# Patient Record
Sex: MALE | Race: WHITE | NOT HISPANIC OR LATINO | Employment: OTHER | URBAN - METROPOLITAN AREA
[De-identification: names, ages, dates, MRNs, and addresses within clinical notes are randomized per-mention and may not be internally consistent; named-entity substitution may affect disease eponyms.]

---

## 2017-01-24 ENCOUNTER — HOSPITAL ENCOUNTER (EMERGENCY)
Facility: HOSPITAL | Age: 52
Discharge: HOME | End: 2017-01-24
Attending: EMERGENCY MEDICINE
Payer: MEDICARE

## 2017-01-24 VITALS
BODY MASS INDEX: 20.89 KG/M2 | SYSTOLIC BLOOD PRESSURE: 96 MMHG | TEMPERATURE: 99 F | DIASTOLIC BLOOD PRESSURE: 65 MMHG | OXYGEN SATURATION: 95 % | WEIGHT: 130 LBS | HEART RATE: 88 BPM | RESPIRATION RATE: 18 BRPM | HEIGHT: 66 IN

## 2017-01-24 DIAGNOSIS — F19.90 IVDU (INTRAVENOUS DRUG USER): ICD-10-CM

## 2017-01-24 DIAGNOSIS — G56.31 RADIAL NERVE PALSY, RIGHT: ICD-10-CM

## 2017-01-24 DIAGNOSIS — M79.601 RIGHT ARM PAIN: Primary | ICD-10-CM

## 2017-01-24 LAB — POCT GLUCOSE: 203 MG/DL (ref 70–110)

## 2017-01-24 PROCEDURE — 29125 APPL SHORT ARM SPLINT STATIC: CPT | Mod: RT

## 2017-01-24 PROCEDURE — 82962 GLUCOSE BLOOD TEST: CPT

## 2017-01-24 PROCEDURE — 99283 EMERGENCY DEPT VISIT LOW MDM: CPT | Mod: 25

## 2017-01-24 NOTE — ED AVS SNAPSHOT
[facility]  [address] [place] [address]               Domenic Mabry   2017  2:30 PM   ED    Description:  Male : 1965   Department:  [facility]           Your Care was Coordinated By:     Provider Role From To    Darrell Pryor MD Attending Provider 17 1438 --      Reason for Visit     Arm Pain           Diagnoses this Visit        Comments    Right arm pain    -  Primary     IVDU (intravenous drug user)         Radial nerve palsy, right           ED Disposition     None           To Do List           Follow-up Information     Schedule an appointment as soon as possible for a visit with Jacob Montanez MD.    Specialty:  Family Medicine    Contact information:    [address]  [place] [address]          Follow up with Sukhdeep Escalera MD.    Specialty:  Neurology    Why:  Neurology    Contact information:    [address]  [address]  [place] [address]  [phone]          Go to Narcotics Anonymous.      [facility]     [facility] Nurse Care [facility] -  Assistance  Registered nurses in the [facility] provide clinical advisement, health education, appointment booking, and other advisory services.  Call for this free service at [phone].             Medications           Message regarding Medications     Verify the changes and/or additions to your medication regime listed below are the same as discussed with your clinician today.  If any of these changes or additions are incorrect, please notify your healthcare provider.        STOP taking these medications     ondansetron (ZOFRAN) 4 MG tablet Take 1 tablet (4 mg total) by mouth every 8 (eight) hours as needed for Nausea.           Verify that the below list of medications is an accurate representation of the medications you are currently taking.  If none reported, the list may be blank. If incorrect, please contact your  "healthcare provider. Carry this list with you in case of emergency.           Current Medications     atorvastatin (LIPITOR) 40 MG tablet Take 40 mg by mouth once daily.    insulin glargine (LANTUS) 100 unit/mL injection Inject 30 Units into the skin every evening.    NOVOLOG 100 unit/mL injection Inject into the skin. Per sliding scale           Clinical Reference Information           Your Vitals Were     BP Pulse Temp Resp Height Weight    96/65 (BP Location: Left arm, Patient Position: Sitting) 88 98.6 °F (37 °C) (Oral) 18 5' 6" (1.676 m) 59 kg (130 lb)    SpO2 BMI             95% 20.98 kg/m2         Allergies as of 1/24/2017     No Known Allergies      Immunizations Administered on Date of Encounter - 1/24/2017     None      ED Micro, Lab, POCT     Start Ordered       Status Ordering Provider    01/24/17 1441 01/24/17 1440  POCT glucose  Once      Acknowledged       ED Imaging Orders     None      Discharge References/Attachments     RADIAL NERVE PALSY (ENGLISH)    HEROIN ABUSE AND ADDICTION, UNDERSTANDING (ENGLISH)      Smoking Cessation     If you would like to quit smoking:   You may be eligible for free services if you are a [place] resident and started smoking cigarettes before September 1, 1988.  Call the Smoking Cessation Trust (SCT) toll free at (115) 870-5860 or (967) 960-0121.   Call 4-800-QUIT-NOW if you do not meet the above criteria.             [facility] complies with applicable Federal civil rights laws and does not discriminate on the basis of race, color, national origin, age, disability, or sex.        Language Assistance Services     ATTENTION: Language assistance services are available, free of charge. Please call [phone].      ATENCIÓN: Si habla español, tiene a olvera disposición servicios gratuitos de asistencia lingüística. Llame al [phone].     CHÚ Ý: N?u b?n nói Ti?ng Vi?t, có các d?ch v? h? tr? ngôn ng? mi?n phí dành cho b?n. G?i s? [phone].   "

## 2017-01-24 NOTE — ED PROVIDER NOTES
"Encounter Date: 1/24/2017    SCRIBE #1 NOTE: I, Edie Marques, am scribing for, and in the presence of,  Darrell Pryor MD. I have scribed the following portions of the note - Other sections scribed: HPI, ROS.       History     Chief Complaint   Patient presents with    Arm Pain     R arm pain "I fell asleep on it."      Review of patient's allergies indicates:  No Known Allergies  HPI Comments: CC: Arm Pain    HPI: 51 year old male, smoker with a PMHx of DM, hepatitis, pancreatitis, and HTN presents to the ED c/o acute, moderate (7/10) right arm pain specifically the forearm and hand. Patient reports going to sleep yesterday, and waking to see his arm had "stopped working". He states the pain is intermittent with a "crazy ache". Patient reports using heroin. Patient otherwise denies elbow pain, shoulder pain and other associated symptoms.        The history is provided by the patient. No  was used.     Past Medical History   Diagnosis Date    Diabetes mellitus     Hepatitis     HTN (hypertension)     Pancreatitis      No past medical history pertinent negatives.  Past Surgical History   Procedure Laterality Date    Shoulder surgery  2013     right shoulder, spur removal.    Cardiac surgery  1999     stent [facility]. ([facility])     Family History   Problem Relation Age of Onset    No Known Problems Mother      Social History   Substance Use Topics    Smoking status: Current Every Day Smoker     Packs/day: 2.00     Types: Cigarettes     Start date: 4/3/1981    Smokeless tobacco: None    Alcohol use No     Review of Systems   Constitutional: Negative for fever.   HENT: Negative for rhinorrhea.    Eyes: Negative for pain.   Respiratory: Negative for cough.    Gastrointestinal: Negative for diarrhea, nausea and vomiting.   Genitourinary: Negative for dysuria.   Musculoskeletal: Negative for neck pain.        (+) Right forearm pain  (+) Right hand pain   Skin: Negative for rash. " "  Neurological: Negative for headaches.       Physical Exam   Initial Vitals   BP Pulse Resp Temp SpO2   01/24/17 1416 01/24/17 1416 01/24/17 1416 01/24/17 1416 01/24/17 1416   96/65 88 18 98.6 °F (37 °C) 95 %     Physical Exam    Nursing note and vitals reviewed.  Constitutional: He appears well-developed and well-nourished.   HENT:   Head: Normocephalic and atraumatic.   Eyes: Conjunctivae are normal. No scleral icterus.   Neck: Normal range of motion. Neck supple.   Pulmonary/Chest: No stridor.   Musculoskeletal: Normal range of motion. He exhibits no edema.        Right hand: He exhibits normal capillary refill. Decreased sensation is present in the radial distribution. Jason Test: There is rapid refill.   Neurological: He is alert and oriented to person, place, and time. He has normal strength.   Skin: Skin is warm and dry. No rash noted.   Psychiatric: He has a normal mood and affect. Thought content normal.         ED Course   Procedures  Labs Reviewed   POCT GLUCOSE - Abnormal; Notable for the following:        Result Value    POCT Glucose 203 (*)     All other components within normal limits           Medical Decision Making:   History:   Old Medical Records: I decided to obtain old medical records.  Old Records Summarized: other records.  Clinical Tests:   Lab Tests: Ordered and Reviewed  Radiological Study: Reviewed    Additional MDM:   Comments: 51 year old male who presents to the ED c/o decreased sensation to his right hand s/p falling "asleep on it" last night.  Exam finding consistent with radial nerve palsy.  He has been placed in a velcro wrist splint.  Recommend supportive care as well as close follow up with both his pcp as well as neurology asap.  Return instructions have been discussed prior to discharge.    .          Scribe Attestation:   Scribe #1: I performed the above scribed service and the documentation accurately describes the services I performed. I attest to the accuracy of the " note.    Attending Attestation:           Physician Attestation for Scribe:  Physician Attestation Statement for Scribe #1: I, Darrell Pryor MD, reviewed documentation, as scribed by Edie Marques in my presence, and it is both accurate and complete.                 ED Course     Clinical Impression:   The primary encounter diagnosis was Right arm pain. Diagnoses of IVDU (intravenous drug user) and Radial nerve palsy, right were also pertinent to this visit.    Disposition:   Disposition: Discharged       Darrell Pryor MD  02/22/17 2546

## 2017-01-26 ENCOUNTER — OFFICE VISIT (OUTPATIENT)
Dept: FAMILY MEDICINE | Facility: CLINIC | Age: 52
End: 2017-01-26
Payer: MEDICARE

## 2017-01-26 VITALS
HEIGHT: 68 IN | DIASTOLIC BLOOD PRESSURE: 76 MMHG | BODY MASS INDEX: 19.25 KG/M2 | HEART RATE: 101 BPM | SYSTOLIC BLOOD PRESSURE: 110 MMHG | TEMPERATURE: 98 F | OXYGEN SATURATION: 99 % | WEIGHT: 127 LBS

## 2017-01-26 DIAGNOSIS — M79.601 UPPER EXTREMITY PAIN, ANTERIOR, RIGHT: Primary | ICD-10-CM

## 2017-01-26 PROCEDURE — 99213 OFFICE O/P EST LOW 20 MIN: CPT | Mod: PBBFAC,PN | Performed by: FAMILY MEDICINE

## 2017-01-26 PROCEDURE — 99999 PR PBB SHADOW E&M-EST. PATIENT-LVL III: CPT | Mod: PBBFAC,,, | Performed by: FAMILY MEDICINE

## 2017-01-26 PROCEDURE — 99214 OFFICE O/P EST MOD 30 MIN: CPT | Mod: S$PBB,,, | Performed by: FAMILY MEDICINE

## 2017-01-26 RX ORDER — GABAPENTIN 300 MG/1
CAPSULE ORAL
Status: ON HOLD | COMMUNITY
Start: 2017-01-20 | End: 2017-11-25

## 2017-01-26 RX ORDER — NABUMETONE 750 MG/1
750 TABLET, FILM COATED ORAL 2 TIMES DAILY
Qty: 60 TABLET | Refills: 0 | Status: ON HOLD | OUTPATIENT
Start: 2017-01-26 | End: 2017-11-25 | Stop reason: HOSPADM

## 2017-01-26 RX ORDER — INSULIN GLARGINE 100 [IU]/ML
INJECTION, SOLUTION SUBCUTANEOUS
Status: ON HOLD | COMMUNITY
Start: 2017-01-03 | End: 2017-11-25

## 2017-01-26 RX ORDER — INSULIN LISPRO 100 [IU]/ML
INJECTION, SOLUTION INTRAVENOUS; SUBCUTANEOUS
Status: ON HOLD | COMMUNITY
Start: 2017-01-03 | End: 2017-11-25 | Stop reason: HOSPADM

## 2017-01-26 NOTE — MR AVS SNAPSHOT
[facility]  [address]  [place] [address]  Phone: [phone]                  Domenic Mabry   2017 1:00 PM   Office Visit    Description:  Male : 1965   Provider:  Stefan Hung MD   Department:  [facility]           Reason for Visit     Arm Pain     Hand Pain           Diagnoses this Visit        Comments    Upper extremity pain, anterior, right    -  Primary            To Do List           Future Appointments        Provider Department Dept Phone    2017 10:20 AM Sukhdeep Escalera MD [facility] [phone]      Goals (5 Years of Data)     None      Follow-Up and Disposition     Return if symptoms worsen or fail to improve.      [facility]     [facility] Care [facility] -  Assistance  Registered nurses in the [facility] provide clinical advisement, health education, appointment booking, and other advisory services.  Call for this free service at [phone].             Medications           Message regarding Medications     Verify the changes and/or additions to your medication regime listed below are the same as discussed with your clinician today.  If any of these changes or additions are incorrect, please notify your healthcare provider.             Verify that the below list of medications is an accurate representation of the medications you are currently taking.  If none reported, the list may be blank. If incorrect, please contact your healthcare provider. Carry this list with you in case of emergency.           Current Medications     gabapentin (NEURONTIN) 300 MG capsule     HUMALOG 100 unit/mL injection     LANTUS 100 unit/mL injection     NOVOLOG 100 unit/mL injection Inject into the skin. Per sliding scale    atorvastatin (LIPITOR) 40 MG tablet Take 40 mg by mouth once daily.           Clinical Reference Information           Vital Signs - Last Recorded  Most recent update: 2017  1:14 PM  "by Marietta De La Vega MA    BP Pulse Temp Ht Wt SpO2    110/76 (BP Location: Right arm, Patient Position: Sitting, BP Method: Manual) 101 98.1 °F (36.7 °C) (Oral) 5' 8" (1.727 m) 57.6 kg (127 lb) 99%    BMI                19.31 kg/m2          Blood Pressure          Most Recent Value    BP  110/76      Allergies as of 1/26/2017     No Known Allergies      Immunizations Administered on Date of Encounter - 1/26/2017     None      Orders Placed During Today's Visit     Future Labs/Procedures Expected by Expires    US Upper Extremity Veins Right  1/26/2017 1/26/2018      Instructions    1.  Patient to keep appointment with neurology and for US of right arm       Smoking Cessation     If you would like to quit smoking:   You may be eligible for free services if you are a [place] resident and started smoking cigarettes before September 1, 1988.  Call the Smoking Cessation Trust (SCT) toll free at ([phone] 744-4940 or (595) [phone].   Call 800-QUIT-NOW if you do not meet the above criteria.            "

## 2017-01-26 NOTE — PROGRESS NOTES
Routine Office Visit    Patient Name: Domenic Mabry    : 1965  MRN: [id number]    Subjective:  Domenic is a 51 y.o. male who presents today for:    1. Right arm pain   Patient states that he woke up 2-3 days ago with severe right arm pain and weakness in that arm.  He was seen in the ED and told it was likely nerve injury from compression and would resolve within a few days.  He states that the movement has improved but that he has a sharp pain in the lower arm just above the wrist.  There has been no other trauma.  He is an IV drug user and states that it has been a few days since he last used.  He is also type 1 diabetic.  He states that he has been taking Ibuprofen for pain, but that it is not helping.  Warm water is helping for short periods.  There has been no swelling or redness.  He denies it being cold to touch.       Past Medical History  Past Medical History   Diagnosis Date    Diabetes mellitus     Hepatitis     HTN (hypertension)     Pancreatitis        Past Surgical History  Past Surgical History   Procedure Laterality Date    Shoulder surgery       right shoulder, spur removal.    Cardiac surgery       stent placed. ([facility])       Family History  Family History   Problem Relation Age of Onset    No Known Problems Mother        Social History  Social History     Social History    Marital status: Single     Spouse name: N/A    Number of children: N/A    Years of education: N/A     Occupational History    Not on file.     Social History Main Topics    Smoking status: Current Every Day Smoker     Packs/day: 2.00     Types: Cigarettes     Start date: 4/3/1981    Smokeless tobacco: Not on file    Alcohol use No    Drug use: Yes     Special: IV      Comment: quit x2 months ago    Sexual activity: Yes     Partners: Female     Other Topics Concern    Not on file     Social History Narrative       Current Medications  Current Outpatient Prescriptions on File Prior to  "Visit   Medication Sig Dispense Refill    NOVOLOG 100 unit/mL injection Inject into the skin. Per sliding scale      atorvastatin (LIPITOR) 40 MG tablet Take 40 mg by mouth once daily.       No current facility-administered medications on file prior to visit.        Allergies   Review of patient's allergies indicates:  No Known Allergies    Review of Systems (Pertinent positives)  Review of Systems   Constitutional: Negative.    HENT: Negative.    Eyes: Negative.    Respiratory: Negative.    Cardiovascular: Negative.    Musculoskeletal: Positive for myalgias.   Skin: Negative.    Neurological: Positive for focal weakness.         Visit Vitals    /76 (BP Location: Right arm, Patient Position: Sitting, BP Method: Manual)    Pulse 101    Temp 98.1 °F (36.7 °C) (Oral)    Ht 5' 8" (1.727 m)    Wt 57.6 kg (127 lb)    SpO2 99%    BMI 19.31 kg/m2       GENERAL APPEARANCE: in no apparent distress and well developed and well nourished  HEENT: PERRL, EOMI, Sclera clear, anicteric, Oropharynx clear, no lesions, Neck supple with midline trachea  NECK: normal, supple, no adenopathy, thyroid normal in size  RESPIRATORY: appears well, vitals normal, no respiratory distress, acyanotic, normal RR, chest clear, no wheezing, crepitations, rhonchi, normal symmetric air entry  HEART: regular rate and rhythm, S1, S2 normal, no murmur, click, rub or gallop.    ABDOMEN: abdomen is soft without tenderness, no masses, no hernias, no organomegaly, no rebound, no guarding. Suprapubic tenderness absent. No CVA tenderness.  SKIN: no rashes, no wounds, no other lesions  Extremities:  Elevated arm without assistance and all fingers moved; no swelling, redness, or pallor.  Pain on palpation of posterior forearm  PSYCH: Alert, oriented x 3, thought content appropriate, speech normal, pleasant and cooperative, good eye contact, well groomed    Assessment/Plan:  Domenic Mabry is a 51 y.o. male who presents today for :    Domenic was " seen today for arm pain and hand pain.    Diagnoses and all orders for this visit:    Upper extremity pain, anterior, right  -     US Upper Extremity Veins Right; Future  -     nabumetone (RELAFEN) 750 MG tablet; Take 1 tablet (750 mg total) by mouth 2 (two) times daily.    1.  Will get US to look for blood clot, but not highly suspicious  2.  No narcotics as patient continues to use IV narcotics  3.  No evidence of arterial thrombosis  4.  He is to call with any concern  5.  He was told not to mix Relafen with ibuprofen      Stefan Hung MD

## 2017-01-30 ENCOUNTER — TELEPHONE (OUTPATIENT)
Dept: FAMILY MEDICINE | Facility: CLINIC | Age: 52
End: 2017-01-30

## 2017-01-30 NOTE — TELEPHONE ENCOUNTER
Patient states medication (Relafen) is not covered by his insurance.. Pt would like another RX to be called in that his insurance will cover

## 2017-01-30 NOTE — TELEPHONE ENCOUNTER
----- Message from Priti Jaquez sent at 1/30/2017  1:16 PM CST -----  Contact: pt came in office visit  Pt came in and said his meds are not covered with his insurance can you give the pt a call to discuss the issue

## 2017-02-02 ENCOUNTER — HOSPITAL ENCOUNTER (OUTPATIENT)
Dept: RADIOLOGY | Facility: HOSPITAL | Age: 52
Discharge: HOME | End: 2017-02-02
Attending: FAMILY MEDICINE
Payer: MEDICARE

## 2017-02-02 DIAGNOSIS — M79.601 UPPER EXTREMITY PAIN, ANTERIOR, RIGHT: ICD-10-CM

## 2017-02-02 PROCEDURE — 93971 EXTREMITY STUDY: CPT | Mod: 26,,, | Performed by: RADIOLOGY

## 2017-02-02 PROCEDURE — 93971 EXTREMITY STUDY: CPT | Mod: TC

## 2017-02-07 ENCOUNTER — TELEPHONE (OUTPATIENT)
Dept: FAMILY MEDICINE | Facility: CLINIC | Age: 52
End: 2017-02-07

## 2017-02-07 NOTE — TELEPHONE ENCOUNTER
----- Message from Stefan Hung MD sent at 2/7/2017  3:09 PM CST -----  Please let patient know that he has a superficial blood clot in his right arm that is likely from introducing a needle/IV into the vein.  There is no need for medication to treat this as they resolve on their own.  He should apply warm compresses to the arm and if pain worsens or persists should go to the ED    Thanks,  Dr. Hung

## 2017-02-27 ENCOUNTER — TELEPHONE (OUTPATIENT)
Dept: NEUROLOGY | Facility: CLINIC | Age: 52
End: 2017-02-27

## 2017-10-06 ENCOUNTER — HOSPITAL ENCOUNTER (EMERGENCY)
Facility: HOSPITAL | Age: 52
Discharge: HOME | End: 2017-10-06
Attending: EMERGENCY MEDICINE
Payer: MEDICARE

## 2017-10-06 VITALS
SYSTOLIC BLOOD PRESSURE: 152 MMHG | OXYGEN SATURATION: 99 % | RESPIRATION RATE: 16 BRPM | TEMPERATURE: 99 F | WEIGHT: 135 LBS | BODY MASS INDEX: 20.46 KG/M2 | HEIGHT: 68 IN | DIASTOLIC BLOOD PRESSURE: 92 MMHG | HEART RATE: 105 BPM

## 2017-10-06 DIAGNOSIS — S29.019A THORACIC MYOFASCIAL STRAIN, INITIAL ENCOUNTER: Primary | ICD-10-CM

## 2017-10-06 DIAGNOSIS — S39.012A LUMBAR STRAIN, INITIAL ENCOUNTER: ICD-10-CM

## 2017-10-06 LAB — POCT GLUCOSE: 117 MG/DL (ref 70–110)

## 2017-10-06 PROCEDURE — 63600175 PHARM REV CODE 636 W HCPCS: Performed by: EMERGENCY MEDICINE

## 2017-10-06 PROCEDURE — 82962 GLUCOSE BLOOD TEST: CPT

## 2017-10-06 PROCEDURE — 25000003 PHARM REV CODE 250: Performed by: EMERGENCY MEDICINE

## 2017-10-06 PROCEDURE — 96372 THER/PROPH/DIAG INJ SC/IM: CPT

## 2017-10-06 PROCEDURE — 99283 EMERGENCY DEPT VISIT LOW MDM: CPT | Mod: 25

## 2017-10-06 RX ORDER — HYDROMORPHONE HYDROCHLORIDE 2 MG/ML
1 INJECTION, SOLUTION INTRAMUSCULAR; INTRAVENOUS; SUBCUTANEOUS
Status: COMPLETED | OUTPATIENT
Start: 2017-10-06 | End: 2017-10-06

## 2017-10-06 RX ORDER — HYDROCODONE BITARTRATE AND ACETAMINOPHEN 5; 325 MG/1; MG/1
1 TABLET ORAL EVERY 4 HOURS PRN
Qty: 15 TABLET | Refills: 0 | Status: SHIPPED | OUTPATIENT
Start: 2017-10-06 | End: 2017-10-16

## 2017-10-06 RX ORDER — ONDANSETRON 8 MG/1
8 TABLET, ORALLY DISINTEGRATING ORAL
Status: COMPLETED | OUTPATIENT
Start: 2017-10-06 | End: 2017-10-06

## 2017-10-06 RX ORDER — METHOCARBAMOL 500 MG/1
1000 TABLET, FILM COATED ORAL 3 TIMES DAILY
Qty: 30 TABLET | Refills: 0 | Status: SHIPPED | OUTPATIENT
Start: 2017-10-06 | End: 2017-10-11

## 2017-10-06 RX ADMIN — HYDROMORPHONE HYDROCHLORIDE 1 MG: 2 INJECTION, SOLUTION INTRAMUSCULAR; INTRAVENOUS; SUBCUTANEOUS at 09:10

## 2017-10-06 RX ADMIN — ONDANSETRON 8 MG: 8 TABLET, ORALLY DISINTEGRATING ORAL at 09:10

## 2017-10-07 NOTE — DISCHARGE INSTRUCTIONS
Please use ice for 24 hours, then you may use heat.  Rest.  Medicines as above.  Please follow-up with your primary care doctor next week.  Return immediately if you get worse or if new problems develop.  Rest.

## 2017-10-07 NOTE — ED TRIAGE NOTES
Pt reports picking up something and then neck and middle part of back began to hurt.  Pt c/o not being able to turn neck.  Pt reports pain as 7.

## 2017-10-07 NOTE — ED PROVIDER NOTES
Encounter Date: 10/6/2017       History     Chief Complaint   Patient presents with    Neck Pain     Pt states he picked something up today and he felt his neck snapped. C/o of middle back pain radiating to neck.     HPI   This 52-year-old male presents to the emergency room complaining of sharp pain in his back generally as well as the right lateral neck.  The patient lifted up a flowerpot and felt a sudden twinge in his back.  It is a sharp burning pain.  It is moderately severe.  It is constant.  It is aggravated by movement.  It is very poorly localized.  There is no history of trauma.  The patient has no bowel or bladder symptoms.  There is a little bit of pain that radiates to his right proximal lateral thigh.  The patient denies other injuries or problems.  He did not fall.  Review of patient's allergies indicates:  No Known Allergies  Past Medical History:   Diagnosis Date    Diabetes mellitus     Hepatitis     HTN (hypertension)     Pancreatitis      Past Surgical History:   Procedure Laterality Date    [facility]    SHOULDER SURGERY  2013    right shoulder, spur removal.     Family History   Problem Relation Age of Onset    No Known Problems Mother      Social History   Substance Use Topics    Smoking status: Current Every Day Smoker     Packs/day: 2.00     Types: Cigarettes     Start date: 4/3/1981    Smokeless tobacco: Never Used    Alcohol use No     Review of Systems  The patient was questioned specifically with regard to the following.  General: Fever, chills, sweats. Neuro: Headache. Eyes: eye problems. ENT: Ear pain, sore throat. Cardiovascular: Chest pain. Respiratory: Cough, shortness of breath. Gastrointestinal: Abdominal pain, vomiting, diarrhea. Genitourinary: Painful urination.  Musculoskeletal: Arm and leg problems. Skin: Rash.  The review of systems was negative except for the following: Back pain, neck pain  Physical Exam     Initial  Vitals [10/06/17 2111]   BP Pulse Resp Temp SpO2   (!) 152/92 105 16 98.8 °F (37.1 °C) 99 %      MAP       112         Physical Exam  The patient was examined specifically for the following:   General:No significant distress, Good color, Warm and dry. Head and neck:Scalp atraumatic, Neck supple. Neurological:Appropriate conversation, Gross motor deficits. Eyes:Conjugate gaze, Clear corneas. ENT: No epistaxis. Cardiac: Regular rate and rhythm, Grossly normal heart tones. Pulmonary: Wheezing, Rales. Gastrointestinal: Abdominal tenderness, Abdominal distention. Musculoskeletal: Extremity deformity, Apparent pain with range of motion of the joints. Skin: Rash.   The findings on examination were normal except for the following: Vital signs are essentially stable.  The patient's lungs are clear and free of wheezing rales of the rhonchi.  Heart tones are normal.  The patient's regular rate and rhythm.  The abdomen is soft.  The patient is mildly diffuse thoracolumbar tenderness and tenderness of the right posterior lateral neck.  There is no midline cervical tenderness.  There is minimal midline spinal tenderness.  ED Course   Procedures  Labs Reviewed   POCT GLUCOSE - Abnormal; Notable for the following:        Result Value    POCT Glucose 117 (*)     All other components within normal limits         Medical decision making: Given the above I doubt fracture.  There is no history of trauma.  There are no signs of cauda equina.  My plan will be to discharge this patient outpatient evaluation and treatment.  I will treat him for pain.  I will have him follow-up with primary care.  I doubt significant disc injuries.  I will treat patient symptomatically and hope for improvement.  There are no significant neurologic findings.                         ED Course      Clinical Impression:   The primary encounter diagnosis was Thoracic myofascial strain, initial encounter. A diagnosis of Lumbar strain, initial encounter was also  pertinent to this visit.                           Cullen Beauchamp MD  10/07/17 0887

## 2017-10-15 ENCOUNTER — HOSPITAL ENCOUNTER (EMERGENCY)
Facility: HOSPITAL | Age: 52
Discharge: HOME | End: 2017-10-15
Attending: EMERGENCY MEDICINE
Payer: MEDICARE

## 2017-10-15 VITALS
SYSTOLIC BLOOD PRESSURE: 107 MMHG | OXYGEN SATURATION: 99 % | HEART RATE: 79 BPM | DIASTOLIC BLOOD PRESSURE: 74 MMHG | BODY MASS INDEX: 20.46 KG/M2 | TEMPERATURE: 98 F | WEIGHT: 135 LBS | RESPIRATION RATE: 18 BRPM | HEIGHT: 68 IN

## 2017-10-15 DIAGNOSIS — Z86.39 HISTORY OF DIABETES MELLITUS: ICD-10-CM

## 2017-10-15 DIAGNOSIS — M54.9 BACK PAIN, UNSPECIFIED BACK LOCATION, UNSPECIFIED BACK PAIN LATERALITY, UNSPECIFIED CHRONICITY: Primary | ICD-10-CM

## 2017-10-15 DIAGNOSIS — Z87.39 HISTORY OF BACK PAIN: ICD-10-CM

## 2017-10-15 LAB — POCT GLUCOSE: 136 MG/DL (ref 70–110)

## 2017-10-15 PROCEDURE — 96372 THER/PROPH/DIAG INJ SC/IM: CPT

## 2017-10-15 PROCEDURE — 63600175 PHARM REV CODE 636 W HCPCS: Performed by: PHYSICIAN ASSISTANT

## 2017-10-15 PROCEDURE — 99284 EMERGENCY DEPT VISIT MOD MDM: CPT | Mod: 25

## 2017-10-15 PROCEDURE — 25000003 PHARM REV CODE 250: Performed by: PHYSICIAN ASSISTANT

## 2017-10-15 PROCEDURE — 82962 GLUCOSE BLOOD TEST: CPT

## 2017-10-15 RX ORDER — KETOROLAC TROMETHAMINE 30 MG/ML
30 INJECTION, SOLUTION INTRAMUSCULAR; INTRAVENOUS
Status: COMPLETED | OUTPATIENT
Start: 2017-10-15 | End: 2017-10-15

## 2017-10-15 RX ORDER — MELOXICAM 7.5 MG/1
7.5 TABLET ORAL DAILY
Qty: 12 TABLET | Refills: 0 | Status: SHIPPED | OUTPATIENT
Start: 2017-10-15 | End: 2017-11-04

## 2017-10-15 RX ORDER — DIAZEPAM 5 MG/1
5 TABLET ORAL EVERY 8 HOURS PRN
Qty: 8 TABLET | Refills: 0 | Status: ON HOLD | OUTPATIENT
Start: 2017-10-15 | End: 2017-11-25 | Stop reason: HOSPADM

## 2017-10-15 RX ORDER — DIAZEPAM 5 MG/1
5 TABLET ORAL
Status: COMPLETED | OUTPATIENT
Start: 2017-10-15 | End: 2017-10-15

## 2017-10-15 RX ADMIN — KETOROLAC TROMETHAMINE 30 MG: 30 INJECTION, SOLUTION INTRAMUSCULAR; INTRAVENOUS at 04:10

## 2017-10-15 RX ADMIN — DIAZEPAM 5 MG: 5 TABLET ORAL at 04:10

## 2017-10-15 NOTE — ED TRIAGE NOTES
Visited  ED 10/6/17 for back pain. Here today with c/o shooting pain in back,  Throbbing constant HA .

## 2017-10-15 NOTE — ED PROVIDER NOTES
"Encounter Date: 10/15/2017    SCRIBE #1 NOTE: I, Pieter Carranza, am scribing for, and in the presence of,  Pieter Rowley PA-C. I have scribed the following portions of the note - Other sections scribed: HPI and ROS.       History     Chief Complaint   Patient presents with    Back Pain     "I threw my back out approx 10 days ago."  Now pains is "back with a vengence".  All over pain, starts in lower back shooting up spine.     CC: Back Pain    HPI: This 52 y.o. Male with diabetes mellitus, hepatitis, HTN and pancreatitis presents to the ED c/o worsening back pain that shoots up to his neck which began x3 weeks ago. He is also c/o shoulder pain which began x1 weeks ago. His pain occurred as the patient was moving stuff around the house and cutting grass. The patient came to the ED on 10/6/17 for the same pain but this time he has c/o shoulder blade pain. He complies with hydrocodone and methocarbamol which has given at discharge on 10/6/17, which helped with relief. He complies with gabapentin for pain which makes his mouth dry. He admits to taking ibuprofen today when he woke up with slight relief. The patient reports his pain hurts the worse in between his shoulder blades. He states that he has had chronic back pain for years but it has never been this bad. The patient is a smoker. He denies chest pain, abdominal pain, fever, chills, rhinorrhea, diarrhea, nausea or emesis. No other symptoms or alleviating factors present.      The history is provided by the patient. No  was used.     Review of patient's allergies indicates:  No Known Allergies  Past Medical History:   Diagnosis Date    Diabetes mellitus     Hepatitis     HTN (hypertension)     Pancreatitis      Past Surgical History:   Procedure Laterality Date    CARDIAC SURGERY  1999    stent placed. ([facility])    SHOULDER SURGERY  2013    right shoulder, spur removal.     Family History   Problem Relation Age of Onset    No " Known Problems Mother      Social History   Substance Use Topics    Smoking status: Current Every Day Smoker     Packs/day: 2.00     Types: Cigarettes     Start date: 4/3/1981    Smokeless tobacco: Never Used    Alcohol use No     Review of Systems   Constitutional: Negative for chills and fever.   HENT: Negative for ear pain and rhinorrhea.    Eyes: Negative for redness.   Respiratory: Negative for cough and shortness of breath.    Cardiovascular: Negative for chest pain and leg swelling.   Gastrointestinal: Negative for abdominal pain, diarrhea, nausea and vomiting.   Genitourinary: Negative for dysuria and hematuria.   Musculoskeletal: Positive for back pain (lower), myalgias (shoulder pain) and neck pain.   Skin: Negative for rash.   Neurological: Negative for headaches.       Physical Exam     Initial Vitals [10/15/17 1505]   BP Pulse Resp Temp SpO2   126/68 97 18 98.5 °F (36.9 °C) 97 %      MAP       87.33         Physical Exam    Nursing note and vitals reviewed.  Constitutional: He appears well-developed and well-nourished. He is not diaphoretic. No distress.   HENT:   Head: Normocephalic and atraumatic.   Nose: Nose normal.   Eyes: Conjunctivae and EOM are normal. Right eye exhibits no discharge. Left eye exhibits no discharge.   Neck: Normal range of motion. No tracheal deviation present. No JVD present.   Cardiovascular: Normal rate, regular rhythm and normal heart sounds. Exam reveals no friction rub.    No murmur heard.  Pulmonary/Chest: Breath sounds normal. No stridor. No respiratory distress. He has no wheezes. He has no rhonchi. He has no rales. He exhibits no tenderness.   Abdominal: Soft. He exhibits no distension. There is no tenderness. There is no rigidity, no rebound, no guarding, no CVA tenderness, no tenderness at McBurney's point and negative Burger's sign.   Musculoskeletal: Normal range of motion.   Diffuse reproducible tenderness of the bilateral trapezius, thoracic, and lumbar  musculature without midline tenderness to the spine or bony deformities to the shoulder or hip.  Fully ranges cervical spine and shoulders without pain.  Ambulating without limp or pain.   Neurological: He is alert and oriented to person, place, and time. He displays no seizure activity. Coordination and gait normal. GCS eye subscore is 4. GCS verbal subscore is 5. GCS motor subscore is 6.   Skin: Skin is warm and dry. No rash and no abscess noted. No erythema. No pallor.         ED Course   Procedures  Labs Reviewed   POCT GLUCOSE - Abnormal; Notable for the following:        Result Value    POCT Glucose 136 (*)     All other components within normal limits             Medical Decision Making:   History:   Old Medical Records: I decided to obtain old medical records.      This is an emergent evaluation of a 52 y.o. male with a PMHx of chronic low back pain, DM, and IVDU presenting to the ED for back pain. Denies traumatic injury, personal Hx of cancer, chronic steroid use, current use of IV drug use, fever, urinary retention/loss of bowel bladder control, urinary symptoms, SOB, and chest pain. Vitals WNL, afebrile. Patient is non-toxic appearing and in no acute distress. Ambulatory. No neurological deficits.    Presentation most consistent with muscle strain. No Hx of trauma to suggest acute vertebral fracture/dislocation, or warrant emergent imaging at this time. I have also considered but doubt cauda equina, AAA rupture, ureteral stone, PE, and PTX. I have a low clinical suspicion for infectious etiology, including for epidural abscess, vertebral osteomyelitis, and pyelonephritis. No HZV. I've considered but have a lower suspicion for neoplastic etiology requiring emergent imaging at this time.     Acute pain controlled in ED. Discharged home with supportive care. Needs orthopedic evaluation and may need evaluation by pain management. An educational resource on back pain and back care tips is issued to the  patient.     I discussed with the patient the diagnosis, treatment plan, indications for return to the emergency department, and for expected follow-up. The patient verbalized an understanding. The patient is asked if there are any questions or concerns. We discuss the case, until all issues are addressed to the patients satisfaction. Patient understands and is agreeable to the plan.     I discussed this patient with Dr. Beauchamp who is in agreement with my assessment and plan           Scribe Attestation:   Scribe #1: I performed the above scribed service and the documentation accurately describes the services I performed. I attest to the accuracy of the note.    Attending Attestation:     Physician Attestation Statement for NP/PA:   I discussed this assessment and plan of this patient with the NP/PA, but I did not personally examine the patient. The face to face encounter was performed by the NP/PA.        Physician Attestation for Scribe:  Physician Attestation Statement for Scribe #1: I, Pieter Rowley PA-C, reviewed documentation, as scribed by Pieter Carranza in my presence, and it is both accurate and complete.                 ED Course      Clinical Impression:   The primary encounter diagnosis was Back pain, unspecified back location, unspecified back pain laterality, unspecified chronicity. Diagnoses of History of back pain and History of diabetes mellitus were also pertinent to this visit.    Disposition:   Disposition: Discharged  Condition: Stable                        Pieter Rowley PA-C  10/15/17 7064       Cullen Beauchamp MD  10/17/17 7912

## 2017-11-04 ENCOUNTER — HOSPITAL ENCOUNTER (EMERGENCY)
Facility: HOSPITAL | Age: 52
Discharge: HOME | End: 2017-11-04
Attending: EMERGENCY MEDICINE
Payer: MEDICARE

## 2017-11-04 VITALS
HEIGHT: 68 IN | WEIGHT: 135 LBS | SYSTOLIC BLOOD PRESSURE: 106 MMHG | HEART RATE: 77 BPM | OXYGEN SATURATION: 99 % | TEMPERATURE: 98 F | DIASTOLIC BLOOD PRESSURE: 59 MMHG | BODY MASS INDEX: 20.46 KG/M2 | RESPIRATION RATE: 18 BRPM

## 2017-11-04 DIAGNOSIS — G89.29 CHRONIC BILATERAL LOW BACK PAIN WITHOUT SCIATICA: Primary | ICD-10-CM

## 2017-11-04 DIAGNOSIS — M54.2 CHRONIC NECK PAIN: ICD-10-CM

## 2017-11-04 DIAGNOSIS — M54.50 CHRONIC BILATERAL LOW BACK PAIN WITHOUT SCIATICA: Primary | ICD-10-CM

## 2017-11-04 DIAGNOSIS — G89.29 CHRONIC NECK PAIN: ICD-10-CM

## 2017-11-04 LAB
ALBUMIN SERPL BCP-MCNC: 3.6 G/DL
ALP SERPL-CCNC: 88 U/L
ALT SERPL W/O P-5'-P-CCNC: 15 U/L
AMPHET+METHAMPHET UR QL: NEGATIVE
ANION GAP SERPL CALC-SCNC: 10 MMOL/L
AST SERPL-CCNC: 19 U/L
BARBITURATES UR QL SCN>200 NG/ML: NEGATIVE
BASOPHILS # BLD AUTO: 0 K/UL
BASOPHILS NFR BLD: 0 %
BENZODIAZ UR QL SCN>200 NG/ML: NORMAL
BILIRUB SERPL-MCNC: 0.5 MG/DL
BILIRUB UR QL STRIP: NEGATIVE
BNP SERPL-MCNC: 14 PG/ML
BUN SERPL-MCNC: 14 MG/DL
BZE UR QL SCN: NORMAL
CALCIUM SERPL-MCNC: 9.6 MG/DL
CANNABINOIDS UR QL SCN: NEGATIVE
CHLORIDE SERPL-SCNC: 100 MMOL/L
CLARITY UR: CLEAR
CO2 SERPL-SCNC: 27 MMOL/L
COLOR UR: NORMAL
CREAT SERPL-MCNC: 0.9 MG/DL
CREAT UR-MCNC: 39.3 MG/DL
CRP SERPL-MCNC: 9.2 MG/L
D DIMER PPP IA.FEU-MCNC: 0.26 MG/L FEU
DIFFERENTIAL METHOD: ABNORMAL
EOSINOPHIL # BLD AUTO: 0 K/UL
EOSINOPHIL NFR BLD: 0.2 %
ERYTHROCYTE [DISTWIDTH] IN BLOOD BY AUTOMATED COUNT: 14.5 %
EST. GFR  (AFRICAN AMERICAN): >60 ML/MIN/1.73 M^2
EST. GFR  (NON AFRICAN AMERICAN): >60 ML/MIN/1.73 M^2
ETHANOL SERPL-MCNC: <10 MG/DL
GLUCOSE SERPL-MCNC: 153 MG/DL
GLUCOSE UR QL STRIP: NEGATIVE
HCT VFR BLD AUTO: 38.4 %
HGB BLD-MCNC: 12.8 G/DL
HGB UR QL STRIP: NEGATIVE
KETONES UR QL STRIP: NEGATIVE
LEUKOCYTE ESTERASE UR QL STRIP: NEGATIVE
LIPASE SERPL-CCNC: 26 U/L
LYMPHOCYTES # BLD AUTO: 2.1 K/UL
LYMPHOCYTES NFR BLD: 50.1 %
MCH RBC QN AUTO: 26.8 PG
MCHC RBC AUTO-ENTMCNC: 33.3 G/DL
MCV RBC AUTO: 80 FL
METHADONE UR QL SCN>300 NG/ML: NEGATIVE
MONOCYTES # BLD AUTO: 0.5 K/UL
MONOCYTES NFR BLD: 10.9 %
NEUTROPHILS # BLD AUTO: 1.6 K/UL
NEUTROPHILS NFR BLD: 38.8 %
NITRITE UR QL STRIP: NEGATIVE
OPIATES UR QL SCN: NORMAL
PCP UR QL SCN>25 NG/ML: NEGATIVE
PH UR STRIP: 5 [PH] (ref 5–8)
PLATELET # BLD AUTO: 202 K/UL
PMV BLD AUTO: 9.4 FL
POCT GLUCOSE: 50 MG/DL (ref 70–110)
POTASSIUM SERPL-SCNC: 4.5 MMOL/L
PROT SERPL-MCNC: 8.1 G/DL
PROT UR QL STRIP: NEGATIVE
RBC # BLD AUTO: 4.78 M/UL
SODIUM SERPL-SCNC: 137 MMOL/L
SP GR UR STRIP: 1.01 (ref 1–1.03)
TOXICOLOGY INFORMATION: NORMAL
TROPONIN I SERPL DL<=0.01 NG/ML-MCNC: 0.01 NG/ML
URN SPEC COLLECT METH UR: NORMAL
UROBILINOGEN UR STRIP-ACNC: NEGATIVE EU/DL
WBC # BLD AUTO: 4.13 K/UL

## 2017-11-04 PROCEDURE — 80320 DRUG SCREEN QUANTALCOHOLS: CPT

## 2017-11-04 PROCEDURE — 83880 ASSAY OF NATRIURETIC PEPTIDE: CPT

## 2017-11-04 PROCEDURE — 84484 ASSAY OF TROPONIN QUANT: CPT

## 2017-11-04 PROCEDURE — 63600175 PHARM REV CODE 636 W HCPCS: Performed by: EMERGENCY MEDICINE

## 2017-11-04 PROCEDURE — 85025 COMPLETE CBC W/AUTO DIFF WBC: CPT

## 2017-11-04 PROCEDURE — 86140 C-REACTIVE PROTEIN: CPT

## 2017-11-04 PROCEDURE — 80307 DRUG TEST PRSMV CHEM ANLYZR: CPT

## 2017-11-04 PROCEDURE — 99284 EMERGENCY DEPT VISIT MOD MDM: CPT | Mod: 25

## 2017-11-04 PROCEDURE — 96372 THER/PROPH/DIAG INJ SC/IM: CPT

## 2017-11-04 PROCEDURE — 85379 FIBRIN DEGRADATION QUANT: CPT

## 2017-11-04 PROCEDURE — 80053 COMPREHEN METABOLIC PANEL: CPT

## 2017-11-04 PROCEDURE — 81003 URINALYSIS AUTO W/O SCOPE: CPT

## 2017-11-04 PROCEDURE — 82962 GLUCOSE BLOOD TEST: CPT

## 2017-11-04 PROCEDURE — 83690 ASSAY OF LIPASE: CPT

## 2017-11-04 RX ORDER — CYCLOBENZAPRINE HCL 10 MG
10 TABLET ORAL 3 TIMES DAILY PRN
Qty: 30 TABLET | Refills: 0 | Status: SHIPPED | OUTPATIENT
Start: 2017-11-04 | End: 2017-11-09

## 2017-11-04 RX ORDER — LIDOCAINE 560 MG/1
1 PATCH PERCUTANEOUS; TOPICAL; TRANSDERMAL DAILY
Qty: 6 PATCH | Refills: 2 | Status: ON HOLD | OUTPATIENT
Start: 2017-11-04 | End: 2017-11-25 | Stop reason: HOSPADM

## 2017-11-04 RX ORDER — KETOROLAC TROMETHAMINE 30 MG/ML
60 INJECTION, SOLUTION INTRAMUSCULAR; INTRAVENOUS
Status: COMPLETED | OUTPATIENT
Start: 2017-11-04 | End: 2017-11-04

## 2017-11-04 RX ORDER — ORPHENADRINE CITRATE 30 MG/ML
60 INJECTION INTRAMUSCULAR; INTRAVENOUS
Status: COMPLETED | OUTPATIENT
Start: 2017-11-04 | End: 2017-11-04

## 2017-11-04 RX ORDER — SULINDAC 200 MG/1
200 TABLET ORAL 2 TIMES DAILY
Qty: 60 TABLET | Refills: 2 | Status: ON HOLD | OUTPATIENT
Start: 2017-11-04 | End: 2017-11-25 | Stop reason: HOSPADM

## 2017-11-04 RX ADMIN — ORPHENADRINE CITRATE 60 MG: 30 INJECTION INTRAMUSCULAR; INTRAVENOUS at 06:11

## 2017-11-04 RX ADMIN — KETOROLAC TROMETHAMINE 60 MG: 30 INJECTION, SOLUTION INTRAMUSCULAR at 06:11

## 2017-11-04 NOTE — ED TRIAGE NOTES
Pt seen in ED with complaints of back pain to entire back. Pt stated he was seen 3 times before and referred to a specialist but his insurance will not cover the visit.

## 2017-11-04 NOTE — ED PROVIDER NOTES
Encounter Date: 11/4/2017    SCRIBE #1 NOTE: I, Jennifer Tse, am scribing for, and in the presence of,  Jennifer Perez MD. I have scribed the following portions of the note - Other sections scribed: HPI, ROS, and PEx.       History     Chief Complaint   Patient presents with    Back Pain     from neck to lower back, reports pain is getting worse,      CC: Back Pain    HPI: This 52 y.o. Male with PMHx of DM, hepatitis, pancreatitis, and HTN presents to the ED c/o chronic, progressively worsening, and severe lower back pain that radiates upwards to the neck. Pain is exacerbated at night when pt is lying down. Pt states this is his 3rd visit to the ED in the past month for the same symptoms. Pt states he vomits after he eats from the pain. He also uses a cane to walk due to the pain. He denies leg numbness/weakness. He denies saddle anesthesia. He denies incontinence or retention of urine/stool. Pt reports he has not slept in the past 3 days due to the pain. [facility] 1.5 years ago, and he was told that he has a bulging disc. Pt cannot afford to be treated at the [facility] because it is not covered by his insurance. Pt took ibuprofen for treatment. Pt also took 1 Valium from his mother for treatment with improvement. Pt denies chest pain, SOB, fever, abdominal pain, urinary incontinence, and fecal incontinence.       The history is provided by the patient. No  was used.     Review of patient's allergies indicates:  No Known Allergies  Past Medical History:   Diagnosis Date    Diabetes mellitus     Hepatitis     HTN (hypertension)     Pancreatitis      Past Surgical History:   Procedure Laterality Date    CARDIAC SURGERY  1999    stent placed. ([facility])    SHOULDER SURGERY  2013    right shoulder, spur removal.     Family History   Problem Relation Age of Onset    No Known Problems Mother      Social History   Substance Use  Topics    Smoking status: Current Every Day Smoker     Packs/day: 1.00     Years: 20.00     Types: Cigarettes     Start date: 4/3/1981    Smokeless tobacco: Never Used    Alcohol use No     Review of Systems   Constitutional: Negative for chills, diaphoresis and fever.   HENT: Negative for ear pain.    Eyes: Negative for pain.   Respiratory: Negative for shortness of breath.    Cardiovascular: Negative for chest pain.   Gastrointestinal: Positive for vomiting. Negative for abdominal pain, diarrhea and nausea.        (-) Fecal incontinence.    Genitourinary: Negative for dysuria.        (-) Urinary incontinence.    Musculoskeletal: Positive for back pain (chronic, progressively worsening, and severe lower back pain that radiates upwards to the neck.).   Skin: Negative for rash.   Neurological: Negative for weakness, numbness and headaches.   Psychiatric/Behavioral: Positive for sleep disturbance (insomnia due to back pain).       Physical Exam     Initial Vitals [11/04/17 0050]   BP Pulse Resp Temp SpO2   (!) 98/58 87 18 97.8 °F (36.6 °C) 96 %      MAP       71.33         Physical Exam    Nursing note and vitals reviewed.  Constitutional: He appears well-developed and well-nourished. He is not diaphoretic.  Non-toxic appearance. No distress.   Awake, alert, thin adult male, uncomfortable but nontoxic.   HENT:   Head: Normocephalic and atraumatic.   Mouth/Throat: Oropharynx is clear and moist.   Eyes: Conjunctivae and EOM are normal. Pupils are equal, round, and reactive to light.   Neck: Normal range of motion. Neck supple.   Cardiovascular: Normal rate, regular rhythm, normal heart sounds and intact distal pulses. Exam reveals no gallop and no friction rub.    No murmur heard.  Pulmonary/Chest: Effort normal and breath sounds normal. No respiratory distress. He has no wheezes. He has no rhonchi. He has no rales. He exhibits no tenderness.   Abdominal: Soft. Bowel sounds are normal. He exhibits no distension.  There is no tenderness. There is no rebound and no guarding.   Musculoskeletal: Normal range of motion. He exhibits no edema or tenderness.   Diffuse paraspinal tenderness.    Neurological: He is alert and oriented to person, place, and time. He has normal strength.   Reflex Scores:       Patellar reflexes are 2+ on the right side and 2+ on the left side.  5/5 strength to lower extremities. 2+ patellar reflexes bilaterally.   Skin: Skin is warm and dry.   Psychiatric: He has a normal mood and affect.         ED Course   Procedures  Labs Reviewed   COMPREHENSIVE METABOLIC PANEL - Abnormal; Notable for the following:        Result Value    Glucose 153 (*)     All other components within normal limits   CBC W/ AUTO DIFFERENTIAL - Abnormal; Notable for the following:     Hemoglobin 12.8 (*)     Hematocrit 38.4 (*)     MCV 80 (*)     MCH 26.8 (*)     Gran # 1.6 (*)     Lymph% 50.1 (*)     All other components within normal limits   POCT GLUCOSE - Abnormal; Notable for the following:     POCT Glucose 50 (*)     All other components within normal limits   LIPASE   URINALYSIS   TROPONIN I   B-TYPE NATRIURETIC PEPTIDE   C-REACTIVE PROTEIN   D DIMER, QUANTITATIVE             Medical Decision Making:   History:   Old Medical Records: I decided to obtain old medical records.  Old Records Summarized: records from previous admission(s).  Initial Assessment:   This is an emergent evaluation of a 52 y.o. Male with chronic back pain, worsening.  Differential Diagnosis:   Ddx includes muscle spasm, bulging disk, metastatic disease, occult infection, other.  Clinical Tests:   Lab Tests: Ordered and Reviewed  ED Management:  CBC, CMP, lipase, UA, troponin, BNP, ddimer reassuring. CRP minimally elevated at 9.2. Tox + cocaine, opiates, benzos. CBG 50 by fingerstick but 153 on chemistry. Not in DKA.    Given minimally elevated CRP and normal ddimer, low suspicion for diffuse metastatic disease.     I checked this patient's [place] . He does  not have a regular prescriber of narcotics or benzos but in the last 3 months he has received rxs from this ED as well as [facility]. I do not feel controlled substances are appropriate in this case. Patient has received toradol and norflex here and I have rx'ed sulindac, flexeril, and lidocaine patches PRN for home use.    I have advised patient strongly that he needs to f/u to spine and/or pain management. I have provided multiple phone numbers to facilitate this.     Smoking cessation discussed.            Scribe Attestation:   Scribe #1: I performed the above scribed service and the documentation accurately describes the services I performed. I attest to the accuracy of the note.    Attending Attestation:           Physician Attestation for Scribe:  Physician Attestation Statement for Scribe #1: I, Jennifer Perez MD, reviewed documentation, as scribed by Jennifer Tse in my presence, and it is both accurate and complete.                 ED Course      Clinical Impression:   The primary encounter diagnosis was Chronic bilateral low back pain without sciatica. A diagnosis of Chronic neck pain was also pertinent to this visit.                           Jennifer Perez MD  11/05/17 0633

## 2017-11-22 ENCOUNTER — HOSPITAL ENCOUNTER (INPATIENT)
Facility: HOSPITAL | Age: 52
LOS: 2 days | Discharge: HOME | DRG: 871 | End: 2017-11-25
Attending: EMERGENCY MEDICINE | Admitting: INTERNAL MEDICINE
Payer: MEDICARE

## 2017-11-22 DIAGNOSIS — I95.9 HYPOTENSION, UNSPECIFIED HYPOTENSION TYPE: ICD-10-CM

## 2017-11-22 DIAGNOSIS — F19.10 INTRAVENOUS DRUG ABUSE: Chronic | ICD-10-CM

## 2017-11-22 DIAGNOSIS — R07.9 ACUTE CHEST PAIN: ICD-10-CM

## 2017-11-22 DIAGNOSIS — A41.9 SEPTIC SHOCK: ICD-10-CM

## 2017-11-22 DIAGNOSIS — R07.9 CHEST PAIN: ICD-10-CM

## 2017-11-22 DIAGNOSIS — R65.21 SEPTIC SHOCK: ICD-10-CM

## 2017-11-22 DIAGNOSIS — R65.10 SIRS (SYSTEMIC INFLAMMATORY RESPONSE SYNDROME): Primary | ICD-10-CM

## 2017-11-22 DIAGNOSIS — Z45.2 ENCOUNTER FOR CENTRAL LINE PLACEMENT: ICD-10-CM

## 2017-11-22 DIAGNOSIS — R50.9 FEVER: ICD-10-CM

## 2017-11-22 LAB
ALBUMIN SERPL BCP-MCNC: 3.1 G/DL
ALP SERPL-CCNC: 142 U/L
ALT SERPL W/O P-5'-P-CCNC: 69 U/L
ANION GAP SERPL CALC-SCNC: 10 MMOL/L
APTT BLDCRRT: 21.9 SEC
AST SERPL-CCNC: 122 U/L
BACTERIA #/AREA URNS HPF: ABNORMAL /HPF
BASOPHILS # BLD AUTO: 0 K/UL
BASOPHILS NFR BLD: 0 %
BILIRUB SERPL-MCNC: 0.5 MG/DL
BILIRUB UR QL STRIP: NEGATIVE
BNP SERPL-MCNC: 45 PG/ML
BUN SERPL-MCNC: 16 MG/DL
CALCIUM SERPL-MCNC: 8.5 MG/DL
CHLORIDE SERPL-SCNC: 105 MMOL/L
CLARITY UR: CLEAR
CO2 SERPL-SCNC: 24 MMOL/L
COLOR UR: YELLOW
CORTIS SERPL-MCNC: 34.1 UG/DL
CREAT SERPL-MCNC: 0.9 MG/DL
DIFFERENTIAL METHOD: ABNORMAL
EOSINOPHIL # BLD AUTO: 0 K/UL
EOSINOPHIL NFR BLD: 0 %
ERYTHROCYTE [DISTWIDTH] IN BLOOD BY AUTOMATED COUNT: 15 %
EST. GFR  (AFRICAN AMERICAN): >60 ML/MIN/1.73 M^2
EST. GFR  (NON AFRICAN AMERICAN): >60 ML/MIN/1.73 M^2
FLUAV AG SPEC QL IA: NEGATIVE
FLUBV AG SPEC QL IA: NEGATIVE
GLUCOSE SERPL-MCNC: 107 MG/DL
GLUCOSE UR QL STRIP: ABNORMAL
HCT VFR BLD AUTO: 33.8 %
HGB BLD-MCNC: 11.3 G/DL
HGB UR QL STRIP: NEGATIVE
INR PPP: 1.1
KETONES UR QL STRIP: NEGATIVE
LACTATE SERPL-SCNC: 1.4 MMOL/L
LACTATE SERPL-SCNC: 2.3 MMOL/L
LDH SERPL L TO P-CCNC: 244 U/L
LEUKOCYTE ESTERASE UR QL STRIP: NEGATIVE
LIPASE SERPL-CCNC: 34 U/L
LYMPHOCYTES # BLD AUTO: 0.2 K/UL
LYMPHOCYTES NFR BLD: 5.4 %
MAGNESIUM SERPL-MCNC: 1.5 MG/DL
MCH RBC QN AUTO: 26.6 PG
MCHC RBC AUTO-ENTMCNC: 33.4 G/DL
MCV RBC AUTO: 80 FL
MICROSCOPIC COMMENT: ABNORMAL
MONOCYTES # BLD AUTO: 0.1 K/UL
MONOCYTES NFR BLD: 1.7 %
NEUTROPHILS # BLD AUTO: 3.8 K/UL
NEUTROPHILS NFR BLD: 92.9 %
NITRITE UR QL STRIP: NEGATIVE
NON-SQ EPI CELLS #/AREA URNS HPF: 1 /HPF
PH UR STRIP: 5 [PH] (ref 5–8)
PHOSPHATE SERPL-MCNC: 1.1 MG/DL
PLATELET # BLD AUTO: 162 K/UL
PMV BLD AUTO: 9.3 FL
POTASSIUM SERPL-SCNC: 3.6 MMOL/L
PROT SERPL-MCNC: 6.7 G/DL
PROT UR QL STRIP: NEGATIVE
PROTHROMBIN TIME: 11.3 SEC
RBC # BLD AUTO: 4.25 M/UL
RBC #/AREA URNS HPF: 1 /HPF (ref 0–4)
SODIUM SERPL-SCNC: 139 MMOL/L
SP GR UR STRIP: 1.01 (ref 1–1.03)
SPECIMEN SOURCE: NORMAL
SQUAMOUS #/AREA URNS HPF: 1 /HPF
T4 FREE SERPL-MCNC: 0.93 NG/DL
TROPONIN I SERPL DL<=0.01 NG/ML-MCNC: 0.01 NG/ML
TSH SERPL DL<=0.005 MIU/L-ACNC: 0.38 UIU/ML
URN SPEC COLLECT METH UR: ABNORMAL
UROBILINOGEN UR STRIP-ACNC: NEGATIVE EU/DL
WBC # BLD AUTO: 4.04 K/UL
WBC #/AREA URNS HPF: 0 /HPF (ref 0–5)
YEAST URNS QL MICRO: ABNORMAL

## 2017-11-22 PROCEDURE — 84439 ASSAY OF FREE THYROXINE: CPT

## 2017-11-22 PROCEDURE — 83605 ASSAY OF LACTIC ACID: CPT

## 2017-11-22 PROCEDURE — 25000003 PHARM REV CODE 250: Performed by: EMERGENCY MEDICINE

## 2017-11-22 PROCEDURE — 85730 THROMBOPLASTIN TIME PARTIAL: CPT

## 2017-11-22 PROCEDURE — 96365 THER/PROPH/DIAG IV INF INIT: CPT

## 2017-11-22 PROCEDURE — 93010 ELECTROCARDIOGRAM REPORT: CPT | Mod: ,,, | Performed by: INTERNAL MEDICINE

## 2017-11-22 PROCEDURE — 83880 ASSAY OF NATRIURETIC PEPTIDE: CPT

## 2017-11-22 PROCEDURE — 87400 INFLUENZA A/B EACH AG IA: CPT | Mod: 59

## 2017-11-22 PROCEDURE — C9113 INJ PANTOPRAZOLE SODIUM, VIA: HCPCS | Performed by: EMERGENCY MEDICINE

## 2017-11-22 PROCEDURE — 87040 BLOOD CULTURE FOR BACTERIA: CPT | Mod: 59

## 2017-11-22 PROCEDURE — 82533 TOTAL CORTISOL: CPT

## 2017-11-22 PROCEDURE — 84145 PROCALCITONIN (PCT): CPT

## 2017-11-22 PROCEDURE — 84100 ASSAY OF PHOSPHORUS: CPT

## 2017-11-22 PROCEDURE — 85025 COMPLETE CBC W/AUTO DIFF WBC: CPT

## 2017-11-22 PROCEDURE — 96376 TX/PRO/DX INJ SAME DRUG ADON: CPT

## 2017-11-22 PROCEDURE — 84443 ASSAY THYROID STIM HORMONE: CPT

## 2017-11-22 PROCEDURE — 83615 LACTATE (LD) (LDH) ENZYME: CPT

## 2017-11-22 PROCEDURE — 96361 HYDRATE IV INFUSION ADD-ON: CPT

## 2017-11-22 PROCEDURE — 20000000 HC ICU ROOM

## 2017-11-22 PROCEDURE — 93005 ELECTROCARDIOGRAM TRACING: CPT

## 2017-11-22 PROCEDURE — 84484 ASSAY OF TROPONIN QUANT: CPT

## 2017-11-22 PROCEDURE — 83735 ASSAY OF MAGNESIUM: CPT

## 2017-11-22 PROCEDURE — 25500020 PHARM REV CODE 255: Performed by: EMERGENCY MEDICINE

## 2017-11-22 PROCEDURE — 99291 CRITICAL CARE FIRST HOUR: CPT

## 2017-11-22 PROCEDURE — 96366 THER/PROPH/DIAG IV INF ADDON: CPT

## 2017-11-22 PROCEDURE — 80053 COMPREHEN METABOLIC PANEL: CPT

## 2017-11-22 PROCEDURE — 96368 THER/DIAG CONCURRENT INF: CPT

## 2017-11-22 PROCEDURE — 81000 URINALYSIS NONAUTO W/SCOPE: CPT

## 2017-11-22 PROCEDURE — 83690 ASSAY OF LIPASE: CPT

## 2017-11-22 PROCEDURE — 96375 TX/PRO/DX INJ NEW DRUG ADDON: CPT

## 2017-11-22 PROCEDURE — 63600175 PHARM REV CODE 636 W HCPCS: Performed by: EMERGENCY MEDICINE

## 2017-11-22 PROCEDURE — 85610 PROTHROMBIN TIME: CPT

## 2017-11-22 PROCEDURE — 87086 URINE CULTURE/COLONY COUNT: CPT

## 2017-11-22 RX ORDER — PANTOPRAZOLE SODIUM 40 MG/10ML
40 INJECTION, POWDER, LYOPHILIZED, FOR SOLUTION INTRAVENOUS
Status: COMPLETED | OUTPATIENT
Start: 2017-11-22 | End: 2017-11-22

## 2017-11-22 RX ORDER — HYDROMORPHONE HYDROCHLORIDE 2 MG/ML
1 INJECTION, SOLUTION INTRAMUSCULAR; INTRAVENOUS; SUBCUTANEOUS
Status: COMPLETED | OUTPATIENT
Start: 2017-11-22 | End: 2017-11-22

## 2017-11-22 RX ORDER — SODIUM CHLORIDE 9 MG/ML
1000 INJECTION, SOLUTION INTRAVENOUS
Status: COMPLETED | OUTPATIENT
Start: 2017-11-22 | End: 2017-11-23

## 2017-11-22 RX ORDER — IBUPROFEN 400 MG/1
800 TABLET ORAL
Status: COMPLETED | OUTPATIENT
Start: 2017-11-22 | End: 2017-11-22

## 2017-11-22 RX ORDER — ACETAMINOPHEN 500 MG
1000 TABLET ORAL
Status: COMPLETED | OUTPATIENT
Start: 2017-11-22 | End: 2017-11-22

## 2017-11-22 RX ADMIN — VANCOMYCIN HYDROCHLORIDE 1500 MG: 1 INJECTION, POWDER, LYOPHILIZED, FOR SOLUTION INTRAVENOUS at 09:11

## 2017-11-22 RX ADMIN — SODIUM CHLORIDE 1000 ML: 0.9 INJECTION, SOLUTION INTRAVENOUS at 09:11

## 2017-11-22 RX ADMIN — PIPERACILLIN SODIUM AND TAZOBACTAM SODIUM 3.38 G: 3; .375 INJECTION, POWDER, LYOPHILIZED, FOR SOLUTION INTRAVENOUS at 08:11

## 2017-11-22 RX ADMIN — SODIUM CHLORIDE 1770 ML: 0.9 INJECTION, SOLUTION INTRAVENOUS at 05:11

## 2017-11-22 RX ADMIN — HYDROMORPHONE HYDROCHLORIDE 1 MG: 2 INJECTION INTRAMUSCULAR; INTRAVENOUS; SUBCUTANEOUS at 08:11

## 2017-11-22 RX ADMIN — IBUPROFEN 800 MG: 400 TABLET, FILM COATED ORAL at 06:11

## 2017-11-22 RX ADMIN — ACETAMINOPHEN 1000 MG: 500 TABLET ORAL at 05:11

## 2017-11-22 RX ADMIN — IOHEXOL 70 ML: 350 INJECTION, SOLUTION INTRAVENOUS at 10:11

## 2017-11-22 RX ADMIN — PANTOPRAZOLE SODIUM 40 MG: 40 INJECTION, POWDER, FOR SOLUTION INTRAVENOUS at 08:11

## 2017-11-22 RX ADMIN — HYDROMORPHONE HYDROCHLORIDE 1 MG: 2 INJECTION INTRAMUSCULAR; INTRAVENOUS; SUBCUTANEOUS at 05:11

## 2017-11-22 NOTE — ED PROVIDER NOTES
Encounter Date: 11/22/2017    SCRIBE #1 NOTE: I, Adrian Phillip, am scribing for, and in the presence of,  Pennie Landry MD. I have scribed the following portions of the note - Other sections scribed: HPI, ROS.       History     Chief Complaint   Patient presents with    Generalized Body Aches     Low BP, fever, chills, and maliase since yesterday. Trembling.      CC: Generalized Body Aches    HPI: This 52 y.o. male smoker with a medical history of Diabetes mellitus; Hepatitis; HTN (hypertension); and Pancreatitis presents to the ED via EMS c/o acute onset generalized body aches accompanied by chest pain, diffuse abdominal pain, nausea, vomiting, diarrhea, fevers, productive coughs, and dysuria that began 1 day ago. Pt states chest and abdominal pain feel the worst and they began at the same time. Pt denies any blood in stool, dark stools, sore throat, rash, or any other associated symptoms. Pt has no modifying factors. Pt has no prior treatment.       The history is provided by the patient. No  was used.     Review of patient's allergies indicates:  No Known Allergies  Past Medical History:   Diagnosis Date    Diabetes mellitus     Hepatitis     HTN (hypertension)     Pancreatitis      Past Surgical History:   Procedure Laterality Date    CARDIAC SURGERY  1999    stent placed. ([facility])    SHOULDER SURGERY  2013    right shoulder, spur removal.     Family History   Problem Relation Age of Onset    No Known Problems Mother      Social History   Substance Use Topics    Smoking status: Current Every Day Smoker     Packs/day: 1.00     Years: 20.00     Types: Cigarettes     Start date: 4/3/1981    Smokeless tobacco: Never Used    Alcohol use No     Review of Systems   Constitutional: Positive for fever.   HENT: Negative for sore throat.    Respiratory: Positive for cough (productive). Negative for shortness of breath.    Cardiovascular: Positive for chest pain.  "  Gastrointestinal: Positive for abdominal pain, diarrhea, nausea and vomiting. Negative for blood in stool.        (-) Dark stools   Genitourinary: Positive for dysuria.   Musculoskeletal: Positive for back pain and myalgias (Generalized body aches).   Skin: Negative for rash.   Neurological: Negative for weakness.   Hematological: Does not bruise/bleed easily.       Physical Exam     Initial Vitals [11/22/17 1617]   BP Pulse Resp Temp SpO2   (!) 93/50 (!) 113 16 100 °F (37.8 °C) 100 %      MAP       64.33         Physical Exam    Nursing note and vitals reviewed.  Constitutional: He is not diaphoretic. He is active. No distress.   Pt appears chronically ill appearing.    HENT:   Head: Normocephalic and atraumatic.   Eyes: Conjunctivae and EOM are normal. Pupils are equal, round, and reactive to light.   Neck: Normal range of motion. Neck supple. JVD present.   Cardiovascular: Normal rate, regular rhythm and normal heart sounds.   No murmur heard.  Pulmonary/Chest: Effort normal and breath sounds normal. No respiratory distress.   Abdominal: Soft. Normal appearance and bowel sounds are normal. He exhibits distension (mild). There is generalized tenderness (generalized). There is guarding. There is no rebound.   Musculoskeletal: Normal range of motion. He exhibits no edema or tenderness.   Neurological: He is alert and oriented to person, place, and time. He has normal strength. No cranial nerve deficit or sensory deficit.   Skin: Skin is warm and dry.   Psychiatric: He has a normal mood and affect. His behavior is normal. Thought content normal.         ED Course   Central Line  Date/Time: 11/23/2017 1:38 AM  Location procedure was performed: [facility] EMERGENCY DEPARTMENT  Performed by: BERNIE CARDENAS  Assisting provider: AVERY HARRISON  Pre-operative Diagnosis: SIRS  Post-operative diagnosis: SIRS  Consent Done: Yes  Time out: Immediately prior to procedure a "time out" was called to verify the correct patient, " procedure, equipment, support staff and site/side marked as required.  Indications: vascular access and med administration  Anesthesia: local infiltration    Anesthesia:  Local Anesthetic: lidocaine 1% without epinephrine  Anesthetic total: 4 mL  Preparation: skin prepped with ChloraPrep  Location details: left internal jugular  Catheter type: triple lumen  Catheter size: 7 Fr  Catheter Length: 16cm    Ultrasound guidance: yes  Vessel Caliber: large, compressibility normal  Needle advanced into vessel with real time Ultrasound guidance.  Guidewire confirmed in vessel.  Sterile sheath used.  Number of attempts: 1  Assessment: placement verified by x-ray,  no pneumothorax on x-ray and successful placement  Complications: none  Estimated blood loss (mL): 2  Specimens: No  Implants: No  Post-procedure: line sutured,  chlorhexidine patch,  sterile dressing applied and blood return through all ports  Complications: No        Labs Reviewed   CBC W/ AUTO DIFFERENTIAL - Abnormal; Notable for the following:        Result Value    RBC 4.25 (*)     Hemoglobin 11.3 (*)     Hematocrit 33.8 (*)     MCV 80 (*)     MCH 26.6 (*)     RDW 15.0 (*)     Lymph # 0.2 (*)     Mono # 0.1 (*)     Gran% 92.9 (*)     Lymph% 5.4 (*)     Mono% 1.7 (*)     All other components within normal limits    Narrative:     Recoll. [id number] by LM1 at 11/22/2017 19:06, reason:   CLOTTED/DISCARDED/DAMION   COMPREHENSIVE METABOLIC PANEL - Abnormal; Notable for the following:     Calcium 8.5 (*)     Albumin 3.1 (*)     Alkaline Phosphatase 142 (*)      (*)     ALT 69 (*)     All other components within normal limits   LACTIC ACID, PLASMA - Abnormal; Notable for the following:     Lactate (Lactic Acid) 2.3 (*)     All other components within normal limits    Narrative:     Recoll. [id number] by LM1 at 11/22/2017 19:06, reason:   CLOTTED/DISCARDED/DAMION   MAGNESIUM - Abnormal; Notable for the following:     Magnesium 1.5 (*)     All other  components within normal limits   PHOSPHORUS - Abnormal; Notable for the following:     Phosphorus 1.1 (*)     All other components within normal limits   TSH - Abnormal; Notable for the following:     TSH 0.377 (*)     All other components within normal limits   URINALYSIS - Abnormal; Notable for the following:     Glucose, UA 3+ (*)     All other components within normal limits   URINALYSIS MICROSCOPIC - Abnormal; Notable for the following:     Non-Squam Epith 1 (*)     All other components within normal limits   POCT GLUCOSE - Abnormal; Notable for the following:     POCT Glucose 271 (*)     All other components within normal limits   CULTURE, BLOOD   CULTURE, BLOOD   CULTURE, URINE   APTT    Narrative:     Recoll. [id number] by LM1 at 11/22/2017 19:09, reason:   CLOTTED/DISCARDED/DAMION   B-TYPE NATRIURETIC PEPTIDE    Narrative:     Recoll. [id number] by LM1 at 11/22/2017 19:06, reason:   CLOTTED/DISCARDED/DAMION   CORTISOL, RANDOM   LACTIC ACID, PLASMA   LIPASE   PROTIME-INR    Narrative:     Recoll. [id number] by LM1 at 11/22/2017 19:09, reason:   CLOTTED/DISCARDED/DAMION   TROPONIN I   INFLUENZA A AND B ANTIGEN   T4, FREE   LACTATE DEHYDROGENASE   PROCALCITONIN   LACTIC ACID, PLASMA   TYPE & SCREEN     EKG Readings: (Independently Interpreted)   Initial Reading: No STEMI. Previous EKG: Compared with most recent EKG Previous EKG Date: 7/22/16. Rhythm: Sinus Tachycardia. Heart Rate: 127. Ectopy: No Ectopy. ST Segments: Normal ST Segments.       X-Rays:   Independently Interpreted Readings:   Other Readings:  No acute process. No CM, no infiltrate, no effusion, no PTX, no pulmonary edema.       Medical Decision Making:   History:   Old Medical Records: I decided to obtain old medical records.  Old Records Summarized: records from previous admission(s).       <> Summary of Records: Seen in the ER 11/4 for chronic pain and sciatica  Initial Assessment:   3-year-old male with history of diabetes and  pancreatitis presents febrile and tachycardic complaining about chest pain abdominal pain.  Differential Diagnosis:   Substances  Pancreatitis  Cholecystitis  Influenza    Independently Interpreted Test(s):   I have ordered and independently interpreted X-rays - see prior notes.  I have ordered and independently interpreted EKG Reading(s) - see prior notes  Clinical Tests:   Lab Tests: Ordered and Reviewed  Radiological Study: Ordered and Reviewed  Medical Tests: Ordered and Reviewed  ED Management:  Patient arrived febrile, hypotensive, and in acute painful distress.  Given antipyretic and IV hydration and analgesia with some improvement.  Workup notable for mildly elevated lactate, granulocytosis of 92%, negative rapid flu test, chest x-ray and urinalysis negative for infection.  CT abdomen and pelvis did not demonstrate any acute infectious process.  Patient is an IV drug user and his symptoms are concerning for endocarditis.  Patient to be admitted to medicine for further management.  Started on antibiotics in the emergency room and blood culture sent.  Case discussed with hospitalist who expressed concern about persistent hypotension in this patient.  Central line placed for medication administration without difficulty and patient started on norepinephrine drip. Patient to be managed in the ICU.    I spent a total of 30 minutes caring for and overseeing the critical care of this patient. Critical care time was spent treating or preventing major adverse outcome resulting from SIRS/SEPSIS.  Patient was specifically observed and treated with Iv hydration, IV antibiotics,central line placement and pressors, followed by discussion with Dr. Campbell and admission to the hospital.     Other:   I have discussed this case with another health care provider.            Scribe Attestation:   Scribe #1: I performed the above scribed service and the documentation accurately describes the services I performed. I attest to the  accuracy of the note.    Attending Attestation:           Physician Attestation for Scribe:  Physician Attestation Statement for Scribe #1: I, Pennie Landry MD, reviewed documentation, as scribed by Adrian Phillip in my presence, and it is both accurate and complete.                 ED Course      Clinical Impression:   The primary encounter diagnosis was SIRS (systemic inflammatory response syndrome). Diagnoses of Chest pain, Hypotension, unspecified hypotension type, Encounter for central line placement, and Fever were also pertinent to this visit.    Disposition:   Disposition: Admitted  Condition: Fair                        Pennie Landry MD  11/23/17 0225       Pennie Landry MD  11/23/17 0228

## 2017-11-22 NOTE — ED TRIAGE NOTES
Patient presents to the ED via EMS with self. Reports 8/10 right side sharp chest pain that radiates to the whole chest area, chills, fever, cough, nausea, vomiting, and diarrhea x 3 days.

## 2017-11-23 PROBLEM — K86.1 CHRONIC RELAPSING PANCREATITIS: Chronic | Status: ACTIVE | Noted: 2017-11-23

## 2017-11-23 PROBLEM — B18.2 CHRONIC HEPATITIS C: Chronic | Status: ACTIVE | Noted: 2017-11-23

## 2017-11-23 PROBLEM — K86.1 CHRONIC RELAPSING PANCREATITIS: Status: ACTIVE | Noted: 2017-11-23

## 2017-11-23 PROBLEM — R65.21 SEPTIC SHOCK: Status: ACTIVE | Noted: 2017-11-23

## 2017-11-23 PROBLEM — F19.10 INTRAVENOUS DRUG ABUSE: Chronic | Status: ACTIVE | Noted: 2017-11-23

## 2017-11-23 PROBLEM — E78.5 HYPERLIPIDEMIA: Chronic | Status: ACTIVE | Noted: 2017-11-23

## 2017-11-23 PROBLEM — D63.8 ANEMIA OF CHRONIC DISEASE: Chronic | Status: ACTIVE | Noted: 2017-11-23

## 2017-11-23 PROBLEM — A41.9 SEPTIC SHOCK: Status: ACTIVE | Noted: 2017-11-23

## 2017-11-23 PROBLEM — Z72.0 TOBACCO ABUSE: Chronic | Status: ACTIVE | Noted: 2017-11-23

## 2017-11-23 PROBLEM — R65.10 SIRS (SYSTEMIC INFLAMMATORY RESPONSE SYNDROME): Status: ACTIVE | Noted: 2017-11-23

## 2017-11-23 LAB
ALBUMIN SERPL BCP-MCNC: 2.5 G/DL
ALP SERPL-CCNC: 92 U/L
ALT SERPL W/O P-5'-P-CCNC: 49 U/L
ANION GAP SERPL CALC-SCNC: 8 MMOL/L
AST SERPL-CCNC: 55 U/L
BASOPHILS # BLD AUTO: ABNORMAL K/UL
BASOPHILS NFR BLD: 0 %
BILIRUB SERPL-MCNC: 0.3 MG/DL
BUN SERPL-MCNC: 17 MG/DL
CALCIUM SERPL-MCNC: 7.6 MG/DL
CHLORIDE SERPL-SCNC: 106 MMOL/L
CO2 SERPL-SCNC: 21 MMOL/L
CREAT SERPL-MCNC: 1.1 MG/DL
DIASTOLIC DYSFUNCTION: NO
DIFFERENTIAL METHOD: ABNORMAL
EOSINOPHIL # BLD AUTO: ABNORMAL K/UL
EOSINOPHIL NFR BLD: 0 %
ERYTHROCYTE [DISTWIDTH] IN BLOOD BY AUTOMATED COUNT: 15.2 %
EST. GFR  (AFRICAN AMERICAN): >60 ML/MIN/1.73 M^2
EST. GFR  (NON AFRICAN AMERICAN): >60 ML/MIN/1.73 M^2
ESTIMATED AVG GLUCOSE: 197 MG/DL
ESTIMATED PA SYSTOLIC PRESSURE: 28.81
GLUCOSE SERPL-MCNC: 356 MG/DL
HBA1C MFR BLD HPLC: 8.5 %
HCT VFR BLD AUTO: 32.1 %
HGB BLD-MCNC: 10.5 G/DL
LACTATE SERPL-SCNC: 1.7 MMOL/L
LYMPHOCYTES # BLD AUTO: ABNORMAL K/UL
LYMPHOCYTES NFR BLD: 6 %
MAGNESIUM SERPL-MCNC: 1.6 MG/DL
MCH RBC QN AUTO: 26.4 PG
MCHC RBC AUTO-ENTMCNC: 32.7 G/DL
MCV RBC AUTO: 81 FL
MITRAL VALVE REGURGITATION: NORMAL
MONOCYTES # BLD AUTO: ABNORMAL K/UL
MONOCYTES NFR BLD: 5 %
NEUTROPHILS NFR BLD: 79 %
NEUTS BAND NFR BLD MANUAL: 10 %
PHOSPHATE SERPL-MCNC: 3.3 MG/DL
PLATELET # BLD AUTO: 191 K/UL
PMV BLD AUTO: 9.4 FL
POCT GLUCOSE: 191 MG/DL (ref 70–110)
POCT GLUCOSE: 271 MG/DL (ref 70–110)
POCT GLUCOSE: 309 MG/DL (ref 70–110)
POCT GLUCOSE: 338 MG/DL (ref 70–110)
POCT GLUCOSE: 452 MG/DL (ref 70–110)
POCT GLUCOSE: 468 MG/DL (ref 70–110)
POTASSIUM SERPL-SCNC: 4.1 MMOL/L
PROCALCITONIN SERPL IA-MCNC: 16.73 NG/ML
PROT SERPL-MCNC: 5.8 G/DL
RBC # BLD AUTO: 3.98 M/UL
RETIRED EF AND QEF - SEE NOTES: 55 (ref 55–65)
SODIUM SERPL-SCNC: 135 MMOL/L
TRICUSPID VALVE REGURGITATION: NORMAL
WBC # BLD AUTO: 12.7 K/UL

## 2017-11-23 PROCEDURE — S4991 NICOTINE PATCH NONLEGEND: HCPCS | Performed by: INTERNAL MEDICINE

## 2017-11-23 PROCEDURE — 85027 COMPLETE CBC AUTOMATED: CPT

## 2017-11-23 PROCEDURE — 85007 BL SMEAR W/DIFF WBC COUNT: CPT

## 2017-11-23 PROCEDURE — 84100 ASSAY OF PHOSPHORUS: CPT

## 2017-11-23 PROCEDURE — 80053 COMPREHEN METABOLIC PANEL: CPT

## 2017-11-23 PROCEDURE — 36556 INSERT NON-TUNNEL CV CATH: CPT

## 2017-11-23 PROCEDURE — 83036 HEMOGLOBIN GLYCOSYLATED A1C: CPT

## 2017-11-23 PROCEDURE — 25000003 PHARM REV CODE 250: Performed by: EMERGENCY MEDICINE

## 2017-11-23 PROCEDURE — 63600175 PHARM REV CODE 636 W HCPCS

## 2017-11-23 PROCEDURE — 96365 THER/PROPH/DIAG IV INF INIT: CPT | Mod: 59

## 2017-11-23 PROCEDURE — 63600175 PHARM REV CODE 636 W HCPCS: Performed by: EMERGENCY MEDICINE

## 2017-11-23 PROCEDURE — 63600175 PHARM REV CODE 636 W HCPCS: Performed by: HOSPITALIST

## 2017-11-23 PROCEDURE — 25000003 PHARM REV CODE 250: Performed by: INTERNAL MEDICINE

## 2017-11-23 PROCEDURE — 93306 TTE W/DOPPLER COMPLETE: CPT

## 2017-11-23 PROCEDURE — 20000000 HC ICU ROOM

## 2017-11-23 PROCEDURE — 82962 GLUCOSE BLOOD TEST: CPT

## 2017-11-23 PROCEDURE — 36415 COLL VENOUS BLD VENIPUNCTURE: CPT

## 2017-11-23 PROCEDURE — 02HV33Z INSERTION OF INFUSION DEVICE INTO SUPERIOR VENA CAVA, PERCUTANEOUS APPROACH: ICD-10-PCS | Performed by: EMERGENCY MEDICINE

## 2017-11-23 PROCEDURE — 93306 TTE W/DOPPLER COMPLETE: CPT | Mod: 26,,, | Performed by: INTERNAL MEDICINE

## 2017-11-23 PROCEDURE — 83735 ASSAY OF MAGNESIUM: CPT

## 2017-11-23 PROCEDURE — 83605 ASSAY OF LACTIC ACID: CPT

## 2017-11-23 PROCEDURE — 63600175 PHARM REV CODE 636 W HCPCS: Performed by: INTERNAL MEDICINE

## 2017-11-23 RX ORDER — IBUPROFEN 200 MG
1 TABLET ORAL DAILY
Status: DISCONTINUED | OUTPATIENT
Start: 2017-11-23 | End: 2017-11-25 | Stop reason: HOSPADM

## 2017-11-23 RX ORDER — POTASSIUM CHLORIDE 20 MEQ/15ML
40 SOLUTION ORAL
Status: DISCONTINUED | OUTPATIENT
Start: 2017-11-23 | End: 2017-11-25 | Stop reason: HOSPADM

## 2017-11-23 RX ORDER — ACETAMINOPHEN 500 MG
500 TABLET ORAL EVERY 6 HOURS PRN
Status: DISCONTINUED | OUTPATIENT
Start: 2017-11-23 | End: 2017-11-25 | Stop reason: HOSPADM

## 2017-11-23 RX ORDER — HEPARIN SODIUM 5000 [USP'U]/ML
5000 INJECTION, SOLUTION INTRAVENOUS; SUBCUTANEOUS EVERY 8 HOURS
Status: DISCONTINUED | OUTPATIENT
Start: 2017-11-23 | End: 2017-11-23

## 2017-11-23 RX ORDER — ONDANSETRON 2 MG/ML
8 INJECTION INTRAMUSCULAR; INTRAVENOUS EVERY 8 HOURS PRN
Status: DISCONTINUED | OUTPATIENT
Start: 2017-11-23 | End: 2017-11-25 | Stop reason: HOSPADM

## 2017-11-23 RX ORDER — INSULIN ASPART 100 [IU]/ML
0-5 INJECTION, SOLUTION INTRAVENOUS; SUBCUTANEOUS
Status: DISCONTINUED | OUTPATIENT
Start: 2017-11-23 | End: 2017-11-25 | Stop reason: HOSPADM

## 2017-11-23 RX ORDER — IBUPROFEN 200 MG
24 TABLET ORAL
Status: DISCONTINUED | OUTPATIENT
Start: 2017-11-23 | End: 2017-11-25 | Stop reason: HOSPADM

## 2017-11-23 RX ORDER — INSULIN ASPART 100 [IU]/ML
0-5 INJECTION, SOLUTION INTRAVENOUS; SUBCUTANEOUS
Status: DISCONTINUED | OUTPATIENT
Start: 2017-11-23 | End: 2017-11-23

## 2017-11-23 RX ORDER — PANTOPRAZOLE SODIUM 40 MG/10ML
40 INJECTION, POWDER, LYOPHILIZED, FOR SOLUTION INTRAVENOUS DAILY
Status: DISCONTINUED | OUTPATIENT
Start: 2017-11-23 | End: 2017-11-23

## 2017-11-23 RX ORDER — GLUCAGON 1 MG
1 KIT INJECTION
Status: DISCONTINUED | OUTPATIENT
Start: 2017-11-23 | End: 2017-11-25 | Stop reason: HOSPADM

## 2017-11-23 RX ORDER — SODIUM CHLORIDE 0.9 % (FLUSH) 0.9 %
3 SYRINGE (ML) INJECTION
Status: DISCONTINUED | OUTPATIENT
Start: 2017-11-23 | End: 2017-11-23

## 2017-11-23 RX ORDER — CEFEPIME HYDROCHLORIDE 2 G/50ML
2 INJECTION, SOLUTION INTRAVENOUS
Status: DISCONTINUED | OUTPATIENT
Start: 2017-11-23 | End: 2017-11-25

## 2017-11-23 RX ORDER — INSULIN ASPART 100 [IU]/ML
10 INJECTION, SOLUTION INTRAVENOUS; SUBCUTANEOUS
Status: DISCONTINUED | OUTPATIENT
Start: 2017-11-23 | End: 2017-11-25 | Stop reason: HOSPADM

## 2017-11-23 RX ORDER — ONDANSETRON 2 MG/ML
4 INJECTION INTRAMUSCULAR; INTRAVENOUS EVERY 8 HOURS PRN
Status: DISCONTINUED | OUTPATIENT
Start: 2017-11-23 | End: 2017-11-23

## 2017-11-23 RX ORDER — POTASSIUM CHLORIDE 20 MEQ/15ML
60 SOLUTION ORAL
Status: DISCONTINUED | OUTPATIENT
Start: 2017-11-23 | End: 2017-11-25 | Stop reason: HOSPADM

## 2017-11-23 RX ORDER — INSULIN ASPART 100 [IU]/ML
10 INJECTION, SOLUTION INTRAVENOUS; SUBCUTANEOUS
Status: DISCONTINUED | OUTPATIENT
Start: 2017-11-23 | End: 2017-11-23

## 2017-11-23 RX ORDER — NOREPINEPHRINE BITARTRATE/D5W 4MG/250ML
0.05 PLASTIC BAG, INJECTION (ML) INTRAVENOUS CONTINUOUS
Status: DISCONTINUED | OUTPATIENT
Start: 2017-11-23 | End: 2017-11-24

## 2017-11-23 RX ORDER — SODIUM CHLORIDE 9 MG/ML
INJECTION, SOLUTION INTRAVENOUS CONTINUOUS
Status: ACTIVE | OUTPATIENT
Start: 2017-11-23 | End: 2017-11-24

## 2017-11-23 RX ORDER — HEPARIN SODIUM 5000 [USP'U]/ML
5000 INJECTION, SOLUTION INTRAVENOUS; SUBCUTANEOUS EVERY 12 HOURS
Status: DISCONTINUED | OUTPATIENT
Start: 2017-11-23 | End: 2017-11-25 | Stop reason: HOSPADM

## 2017-11-23 RX ORDER — HYDROMORPHONE HYDROCHLORIDE 2 MG/ML
1 INJECTION, SOLUTION INTRAMUSCULAR; INTRAVENOUS; SUBCUTANEOUS EVERY 4 HOURS PRN
Status: DISCONTINUED | OUTPATIENT
Start: 2017-11-23 | End: 2017-11-25 | Stop reason: HOSPADM

## 2017-11-23 RX ORDER — IBUPROFEN 200 MG
16 TABLET ORAL
Status: DISCONTINUED | OUTPATIENT
Start: 2017-11-23 | End: 2017-11-25 | Stop reason: HOSPADM

## 2017-11-23 RX ORDER — ATORVASTATIN CALCIUM 40 MG/1
40 TABLET, FILM COATED ORAL DAILY
Status: DISCONTINUED | OUTPATIENT
Start: 2017-11-23 | End: 2017-11-25 | Stop reason: HOSPADM

## 2017-11-23 RX ORDER — INSULIN ASPART 100 [IU]/ML
20 INJECTION, SOLUTION INTRAVENOUS; SUBCUTANEOUS
Status: DISCONTINUED | OUTPATIENT
Start: 2017-11-23 | End: 2017-11-23

## 2017-11-23 RX ORDER — INSULIN ASPART 100 [IU]/ML
3 INJECTION, SOLUTION INTRAVENOUS; SUBCUTANEOUS
Status: DISCONTINUED | OUTPATIENT
Start: 2017-11-23 | End: 2017-11-23

## 2017-11-23 RX ORDER — SODIUM CHLORIDE 9 MG/ML
INJECTION, SOLUTION INTRAVENOUS CONTINUOUS
Status: DISCONTINUED | OUTPATIENT
Start: 2017-11-23 | End: 2017-11-23

## 2017-11-23 RX ORDER — RAMELTEON 8 MG/1
8 TABLET ORAL NIGHTLY PRN
Status: DISCONTINUED | OUTPATIENT
Start: 2017-11-23 | End: 2017-11-25 | Stop reason: HOSPADM

## 2017-11-23 RX ORDER — OXYCODONE HYDROCHLORIDE 5 MG/1
5 TABLET ORAL EVERY 4 HOURS PRN
Status: DISCONTINUED | OUTPATIENT
Start: 2017-11-23 | End: 2017-11-25 | Stop reason: HOSPADM

## 2017-11-23 RX ADMIN — SODIUM CHLORIDE: 0.9 INJECTION, SOLUTION INTRAVENOUS at 05:11

## 2017-11-23 RX ADMIN — SODIUM CHLORIDE: 0.9 INJECTION, SOLUTION INTRAVENOUS at 03:11

## 2017-11-23 RX ADMIN — OXYCODONE HYDROCHLORIDE 5 MG: 5 TABLET ORAL at 04:11

## 2017-11-23 RX ADMIN — INSULIN ASPART 4 UNITS: 100 INJECTION, SOLUTION INTRAVENOUS; SUBCUTANEOUS at 06:11

## 2017-11-23 RX ADMIN — INSULIN ASPART 3 UNITS: 100 INJECTION, SOLUTION INTRAVENOUS; SUBCUTANEOUS at 06:11

## 2017-11-23 RX ADMIN — OXYCODONE HYDROCHLORIDE 5 MG: 5 TABLET ORAL at 08:11

## 2017-11-23 RX ADMIN — VANCOMYCIN HYDROCHLORIDE 1000 MG: 1 INJECTION, POWDER, LYOPHILIZED, FOR SOLUTION INTRAVENOUS at 09:11

## 2017-11-23 RX ADMIN — INSULIN ASPART 10 UNITS: 100 INJECTION, SOLUTION INTRAVENOUS; SUBCUTANEOUS at 04:11

## 2017-11-23 RX ADMIN — HEPARIN SODIUM 5000 UNITS: 5000 INJECTION, SOLUTION INTRAVENOUS; SUBCUTANEOUS at 08:11

## 2017-11-23 RX ADMIN — INSULIN ASPART 5 UNITS: 100 INJECTION, SOLUTION INTRAVENOUS; SUBCUTANEOUS at 11:11

## 2017-11-23 RX ADMIN — ATORVASTATIN CALCIUM 40 MG: 40 TABLET, FILM COATED ORAL at 08:11

## 2017-11-23 RX ADMIN — INSULIN ASPART 10 UNITS: 100 INJECTION, SOLUTION INTRAVENOUS; SUBCUTANEOUS at 11:11

## 2017-11-23 RX ADMIN — NOREPINEPHRINE-DEXTROSE IV SOLUTION 4 MG/250ML-5% 0.02 MCG/KG/MIN: 4-5/250 SOLUTION at 02:11

## 2017-11-23 RX ADMIN — CEFEPIME HYDROCHLORIDE 2 G: 2 INJECTION, SOLUTION INTRAVENOUS at 03:11

## 2017-11-23 RX ADMIN — INSULIN ASPART 4 UNITS: 100 INJECTION, SOLUTION INTRAVENOUS; SUBCUTANEOUS at 04:11

## 2017-11-23 RX ADMIN — HYDROMORPHONE HYDROCHLORIDE 1 MG: 2 INJECTION, SOLUTION INTRAMUSCULAR; INTRAVENOUS; SUBCUTANEOUS at 05:11

## 2017-11-23 RX ADMIN — OXYCODONE HYDROCHLORIDE 5 MG: 5 TABLET ORAL at 07:11

## 2017-11-23 RX ADMIN — ACETAMINOPHEN 500 MG: 500 TABLET ORAL at 08:11

## 2017-11-23 RX ADMIN — VANCOMYCIN HYDROCHLORIDE 1000 MG: 1 INJECTION, POWDER, LYOPHILIZED, FOR SOLUTION INTRAVENOUS at 10:11

## 2017-11-23 RX ADMIN — NICOTINE 1 PATCH: 21 PATCH, EXTENDED RELEASE TRANSDERMAL at 08:11

## 2017-11-23 RX ADMIN — NOREPINEPHRINE-DEXTROSE IV SOLUTION 4 MG/250ML-5% 0.05 MCG/KG/MIN: 4-5/250 SOLUTION at 02:11

## 2017-11-23 RX ADMIN — HYDROMORPHONE HYDROCHLORIDE 1 MG: 2 INJECTION, SOLUTION INTRAMUSCULAR; INTRAVENOUS; SUBCUTANEOUS at 09:11

## 2017-11-23 RX ADMIN — INSULIN DETEMIR 20 UNITS: 100 INJECTION, SOLUTION SUBCUTANEOUS at 09:11

## 2017-11-23 RX ADMIN — CEFEPIME HYDROCHLORIDE 2 G: 2 INJECTION, SOLUTION INTRAVENOUS at 11:11

## 2017-11-23 RX ADMIN — OXYCODONE HYDROCHLORIDE 5 MG: 5 TABLET ORAL at 03:11

## 2017-11-23 RX ADMIN — OXYCODONE HYDROCHLORIDE 5 MG: 5 TABLET ORAL at 11:11

## 2017-11-23 RX ADMIN — HYDROMORPHONE HYDROCHLORIDE 1 MG: 2 INJECTION, SOLUTION INTRAMUSCULAR; INTRAVENOUS; SUBCUTANEOUS at 01:11

## 2017-11-23 RX ADMIN — CEFEPIME HYDROCHLORIDE 2 G: 2 INJECTION, SOLUTION INTRAVENOUS at 06:11

## 2017-11-23 RX ADMIN — RAMELTEON 8 MG: 8 TABLET, FILM COATED ORAL at 09:11

## 2017-11-23 RX ADMIN — HEPARIN SODIUM 5000 UNITS: 5000 INJECTION, SOLUTION INTRAVENOUS; SUBCUTANEOUS at 09:11

## 2017-11-23 RX ADMIN — INSULIN DETEMIR 20 UNITS: 100 INJECTION, SOLUTION SUBCUTANEOUS at 11:11

## 2017-11-23 NOTE — PLAN OF CARE
Problem: Patient Care Overview  Goal: Plan of Care Review  Outcome: Ongoing (interventions implemented as appropriate)  VSS. Weaned of levophed. Chronic pain managed with PRN meds. Levo off since 0840. MRI lumber spine ordered as routine. Extra insulin given for elevated glucose prior to lunch. Afebrile. Fluids infusing. CVP 6-7. No hospital acquired injuries or falls.

## 2017-11-23 NOTE — H&P
"[facility] Hospital Medicine  History & Physical    Patient Name: Domenic Mabry  MRN: [id number]  Admission Date: 11/22/2017  Attending Physician: Kalyani Alberto MD   Primary Care Provider: Jacob Montanez MD         Patient information was obtained from patient.     Subjective:     Principal Problem:Septic shock    Chief Complaint: Chest pain for three days.    HPI: Mr. Domenic Mabry is a 52 y.o. male with type 2 diabetes mellitus (HbA1c 8.2% Jul 2016), hyperlipidemia (LDL 86.6 Apr 2016), intravenous drug abuse, anemia of chronic disease, chronic hepatitis C, chronic relapsing pancreatitis, and tobacco abuse who presents to [facility] ED with complaints of chest pain for three days.  The pain has actually been present "for a while" but acutely worsened in the last three days.  The pain is left-sided with radiation to his back, is sharp in quality, and is 10/10 in severity.  There are no alleviating or exacerbating factors, and the pain is not associated with any dyspnea, pleurisy, night sweats, weight loss, fevers, chills, hemoptysis, nor any lower extremity pain or swelling.  He denies any PND nor orthopnea.  He does admit to injecting heroin with his last time about a month ago.  He does that to relieve his back pain.  He has never had similar episodes in the past.    Chart Review:  Previous Hospitalizations  Date Hospital Diagnosis   Jul 2016 [facility] Diabetic ketoacidosis    Juanito 2016 [facility] Acute pancreatitis      Outpatient Follow-Up  Date of Visit Physician Service   Jan 2017 Stefan Hung MD Primary Care     Past Medical History:   Diagnosis Date    Diabetes mellitus     Hepatitis     HTN (hypertension)     Pancreatitis        Past Surgical History:   Procedure Laterality Date    CARDIAC SURGERY  1999    stent placed. ([facility])    SHOULDER SURGERY  2013    right shoulder, spur removal.       Review of patient's allergies indicates:  No Known Allergies    No current " facility-administered medications on file prior to encounter.      Current Outpatient Prescriptions on File Prior to Encounter   Medication Sig    atorvastatin (LIPITOR) 40 MG tablet Take 40 mg by mouth once daily.    gabapentin (NEURONTIN) 300 MG capsule     HUMALOG 100 unit/mL injection     LANTUS 100 unit/mL injection     lidocaine 4 % PtMd Apply 1 patch topically once daily.    nabumetone (RELAFEN) 750 MG tablet Take 1 tablet (750 mg total) by mouth 2 (two) times daily.    NOVOLOG 100 unit/mL injection Inject into the skin. Per sliding scale    sulindac (CLINORIL) 200 MG Tab Take 1 tablet (200 mg total) by mouth 2 (two) times daily.    diazePAM (VALIUM) 5 MG tablet Take 1 tablet (5 mg total) by mouth every 8 (eight) hours as needed for Anxiety.     Family History     Problem Relation (Age of Onset)    No Known Problems Mother        Social History Main Topics    Smoking status: Current Every Day Smoker     Packs/day: 1.00     Years: 20.00     Types: Cigarettes     Start date: 4/3/1981    Smokeless tobacco: Never Used    Alcohol use No    Drug use:      Types: IV      Comment: very long time ago    Sexual activity: Yes     Partners: Female     Review of Systems   Constitutional: Positive for fatigue and unexpected weight change. Negative for activity change, appetite change, chills, diaphoresis and fever.   HENT: Negative.    Eyes: Negative.    Respiratory: Negative for cough, chest tightness, shortness of breath and wheezing.    Cardiovascular: Positive for chest pain. Negative for palpitations and leg swelling.   Gastrointestinal: Negative for abdominal distention, abdominal pain, blood in stool, constipation, diarrhea, nausea and vomiting.   Genitourinary: Negative for dysuria and hematuria.   Musculoskeletal: Negative.    Skin: Negative.    Neurological: Negative for dizziness, seizures, syncope, weakness and light-headedness.   Psychiatric/Behavioral: Negative.      Objective:     Vital Signs  (Most Recent):  Temp: 99.3 °F (37.4 °C) (11/23/17 0230)  Pulse: 90 (11/23/17 0415)  Resp: 20 (11/23/17 0415)  BP: (!) 110/56 (11/23/17 0415)  SpO2: 99 % (11/23/17 0415) Vital Signs (24h Range):  Temp:  [98.7 °F (37.1 °C)-103 °F (39.4 °C)] 99.3 °F (37.4 °C)  Pulse:  [] 90  Resp:  [16-26] 20  SpO2:  [96 %-100 %] 99 %  BP: ()/(50-75) 110/56     Weight: 58.6 kg (129 lb 3 oz)  Body mass index is 19.64 kg/m².    Physical Exam   Constitutional: He is oriented to person, place, and time. He appears well-developed and well-nourished. No distress.   HENT:   Head: Normocephalic and atraumatic.   Right Ear: External ear normal.   Left Ear: External ear normal.   Nose: Nose normal.   Eyes: Right eye exhibits no discharge. Left eye exhibits no discharge.   Neck: Normal range of motion.   Cardiovascular: Normal rate, regular rhythm, normal heart sounds and intact distal pulses.  Exam reveals no gallop and no friction rub.    No murmur heard.  Pulmonary/Chest: Effort normal and breath sounds normal. No respiratory distress. He has no wheezes. He has no rales. He exhibits no tenderness.   Abdominal: Soft. Bowel sounds are normal. He exhibits no distension. There is no tenderness. There is no rebound and no guarding.   Musculoskeletal: Normal range of motion. He exhibits no edema.   Neurological: He is alert and oriented to person, place, and time.   Skin: Skin is warm and dry. He is not diaphoretic. No erythema.   Psychiatric: He has a normal mood and affect. His behavior is normal. Judgment and thought content normal.   Nursing note and vitals reviewed.       Significant Labs: All pertinent labs within the past 24 hours have been reviewed.    Significant Imaging: I have reviewed and interpreted all pertinent imaging results/findings within the past 24 hours.    Assessment/Plan:     * Septic shock    Patient does meet criteria for SIRS given his fever, tachycardia, suspected endocarditis, and admitted IV drug use, though  he only meets 2 minor criteria at this time.  Blood and 2D-echo are pending.  He has been persistently hypotensive despite a more-than-adequate IV fluid challenge and has ultimately required vasopressor support.  Otherwise, his urinalysis, chest radiograph, and his history does not support any other evidence of infection.  Will consult Infectious Diseases and start empiric antibiotics.        Type 2 diabetes mellitus, uncontrolled    Poorly-controlled on a home regimen of basal-prandial insulin therapy; will provide basal-prandial insulin along with insulin sliding scale.        Hyperlipidemia    Well-controlled; will continue patient's home regimen of atorvastatin.        Intravenous drug abuse    As addressed above.        Anemia of chronic disease    The patient's H/H is stable and consistent with previous laboratory measurements, and the patient exhibits no signs or symptoms of acute bleeding; there is no indication for transfusion.  Will continue to monitor.        Chronic hepatitis C    Stable; there are no acute issues.        Chronic relapsing pancreatitis    Stable; there are no acute issues.        Tobacco abuse    Patient was counseled on smoking cessation and he will be provided a nicotine transdermal patch applied while inpatient.  Will provide additional smoking cessation counseling prior to discharge.          VTE Risk Mitigation         Ordered     heparin (porcine) injection 5,000 Units  Every 12 hours     Route:  Subcutaneous        11/23/17 0524     Medium Risk of VTE  Once      11/23/17 0524        Critical care time spent on the evaluation and treatment of severe organ dysfunction, review of pertinent labs and imaging studies, discussions with consulting providers and discussions with patient/family: 60 minutes.         Robyn Campbell M.D.  Staff Nocturnist  Department of Hospital Medicine  [facility]  Pager: [phone]

## 2017-11-23 NOTE — SUBJECTIVE & OBJECTIVE
Past Medical History:   Diagnosis Date    Diabetes mellitus     Hepatitis     HTN (hypertension)     Pancreatitis        Past Surgical History:   Procedure Laterality Date    CARDIAC SURGERY  1999    stent placed. ([facility])    SHOULDER SURGERY  2013    right shoulder, spur removal.       Review of patient's allergies indicates:  No Known Allergies    No current facility-administered medications on file prior to encounter.      Current Outpatient Prescriptions on File Prior to Encounter   Medication Sig    atorvastatin (LIPITOR) 40 MG tablet Take 40 mg by mouth once daily.    gabapentin (NEURONTIN) 300 MG capsule     HUMALOG 100 unit/mL injection     LANTUS 100 unit/mL injection     lidocaine 4 % PtMd Apply 1 patch topically once daily.    nabumetone (RELAFEN) 750 MG tablet Take 1 tablet (750 mg total) by mouth 2 (two) times daily.    NOVOLOG 100 unit/mL injection Inject into the skin. Per sliding scale    sulindac (CLINORIL) 200 MG Tab Take 1 tablet (200 mg total) by mouth 2 (two) times daily.    diazePAM (VALIUM) 5 MG tablet Take 1 tablet (5 mg total) by mouth every 8 (eight) hours as needed for Anxiety.     Family History     Problem Relation (Age of Onset)    No Known Problems Mother        Social History Main Topics    Smoking status: Current Every Day Smoker     Packs/day: 1.00     Years: 20.00     Types: Cigarettes     Start date: 4/3/1981    Smokeless tobacco: Never Used    Alcohol use No    Drug use:      Types: IV      Comment: very long time ago    Sexual activity: Yes     Partners: Female     Review of Systems   Constitutional: Positive for fatigue and unexpected weight change. Negative for activity change, appetite change, chills, diaphoresis and fever.   HENT: Negative.    Eyes: Negative.    Respiratory: Negative for cough, chest tightness, shortness of breath and wheezing.    Cardiovascular: Positive for chest pain. Negative for palpitations and leg swelling.    Gastrointestinal: Negative for abdominal distention, abdominal pain, blood in stool, constipation, diarrhea, nausea and vomiting.   Genitourinary: Negative for dysuria and hematuria.   Musculoskeletal: Negative.    Skin: Negative.    Neurological: Negative for dizziness, seizures, syncope, weakness and light-headedness.   Psychiatric/Behavioral: Negative.      Objective:     Vital Signs (Most Recent):  Temp: 99.3 °F (37.4 °C) (11/23/17 0230)  Pulse: 90 (11/23/17 0415)  Resp: 20 (11/23/17 0415)  BP: (!) 110/56 (11/23/17 0415)  SpO2: 99 % (11/23/17 0415) Vital Signs (24h Range):  Temp:  [98.7 °F (37.1 °C)-103 °F (39.4 °C)] 99.3 °F (37.4 °C)  Pulse:  [] 90  Resp:  [16-26] 20  SpO2:  [96 %-100 %] 99 %  BP: ()/(50-75) 110/56     Weight: 58.6 kg (129 lb 3 oz)  Body mass index is 19.64 kg/m².    Physical Exam   Constitutional: He is oriented to person, place, and time. He appears well-developed and well-nourished. No distress.   HENT:   Head: Normocephalic and atraumatic.   Right Ear: External ear normal.   Left Ear: External ear normal.   Nose: Nose normal.   Eyes: Right eye exhibits no discharge. Left eye exhibits no discharge.   Neck: Normal range of motion.   Cardiovascular: Normal rate, regular rhythm, normal heart sounds and intact distal pulses.  Exam reveals no gallop and no friction rub.    No murmur heard.  Pulmonary/Chest: Effort normal and breath sounds normal. No respiratory distress. He has no wheezes. He has no rales. He exhibits no tenderness.   Abdominal: Soft. Bowel sounds are normal. He exhibits no distension. There is no tenderness. There is no rebound and no guarding.   Musculoskeletal: Normal range of motion. He exhibits no edema.   Neurological: He is alert and oriented to person, place, and time.   Skin: Skin is warm and dry. He is not diaphoretic. No erythema.   Psychiatric: He has a normal mood and affect. His behavior is normal. Judgment and thought content normal.   Nursing note  and vitals reviewed.       Significant Labs: All pertinent labs within the past 24 hours have been reviewed.    Significant Imaging: I have reviewed and interpreted all pertinent imaging results/findings within the past 24 hours.

## 2017-11-23 NOTE — PLAN OF CARE
[facility] Pt stated that his last usage was 3-4wks ago. Last positive test was 11/04/2017.      [facility] - [facility] - [place], [place] - [address] [address] [facility] [address]  Phone: [phone] Fax: [phone]       11/23/17 0947   Discharge Assessment   Assessment Type Discharge Planning Assessment   Confirmed/corrected address and phone number on facesheet? Yes   Assessment information obtained from? Patient   Prior to hospitilization cognitive status: Alert/Oriented   Prior to hospitalization functional status: Independent   Current cognitive status: Alert/Oriented   Current Functional Status: Independent   Lives With parent(s)   Able to Return to Prior Arrangements yes   Is patient able to care for self after discharge? Yes   Who are your caregiver(s) and their phone number(s)? Marilou    Patient's perception of discharge disposition home or selfcare   Readmission Within The Last 30 Days no previous admission in last 30 days   Equipment Currently Used at Home none;glucometer   Do you have any problems affording any of your prescribed medications? No   Is the patient taking medications as prescribed? yes   Does the patient have transportation home? Yes   Transportation Available car   Discharge Plan A Home with family   Discharge Plan B Home with family   Patient/Family In Agreement With Plan yes

## 2017-11-23 NOTE — ASSESSMENT & PLAN NOTE
Patient does meet criteria for SIRS given his fever, tachycardia, suspected endocarditis, and admitted IV drug use, though he only meets 2 minor criteria at this time.  Blood and 2D-echo are pending.  He has been persistently hypotensive despite a more-than-adequate IV fluid challenge and has ultimately required vasopressor support.  Otherwise, his urinalysis, chest radiograph, and his history does not support any other evidence of infection.  Will consult Infectious Diseases and start empiric antibiotics.

## 2017-11-23 NOTE — CONSULTS
Consult Note  Infectious Disease    Consult Requested By: Kalyani Alberto MD    Reason for Consult: back pain and fever    SUBJECTIVE:     History of Present Illness:  Patient is a 52 y.o. male presents with  Fever of 103. He is an ivdu and was out from rehab 2 weeks ago. He thinks he last shot up 3 weeks ago. He uses heroin. He now has a fever and back pain. He is also known to have chronic pancreatitis. bloo cultures and echo pending. Wbc is normal as is lipase. He states he has hep c. hiv not done yet.ct abdo shows an enlarged liver, rt femoral lucency with sclerotic margins in the intertrochanteric region of unknown etiology    Past Medical History:   Diagnosis Date    Diabetes mellitus     Hepatitis     HTN (hypertension)     Pancreatitis      Past Surgical History:   Procedure Laterality Date    [facility] [place] [place]    SHOULDER SURGERY  2013    right shoulder, spur removal.     Family History   Problem Relation Age of Onset    No Known Problems Mother      Social History   Substance Use Topics    Smoking status: Current Every Day Smoker     Packs/day: 1.00     Years: 20.00     Types: Cigarettes     Start date: 4/3/1981    Smokeless tobacco: Never Used    Alcohol use No       Review of patient's allergies indicates:  No Known Allergies     Antibiotics     Start     Stop Route Frequency Ordered    11/23/17 1000  vancomycin 1 g in dextrose 5 % 250 mL IVPB (ready to mix system)  (Vancomycin IVPB with levels panel)      -- IV Every 12 hours (non-standard times) 11/23/17 0413    11/23/17 0245  ceFEPIme in dextrose 5% 2 gram/50 mL IVPB 2 g      -- IV Every 8 hours (non-standard times) 11/23/17 0239          Review of Systems:  Constitutional: positive for chills, fatigue and fevers  Eyes: no visual changes  ENT: no nasal congestion or sore throat  Respiratory: no cough or shortness of breath  Cardiovascular: no chest pain or palpitations  Gastrointestinal: no  nausea or vomiting, no abdominal pain or change in bowel habits  Genitourinary: no hematuria or dysuria  Hematologic/Lymphatic: no easy bruising or lymphadenopathy  Musculoskeletal: back pain    OBJECTIVE:     Vital Signs (Most Recent)  Temp: 98.3 °F (36.8 °C) (11/23/17 0800)  Pulse: 91 (11/23/17 0915)  Resp: (!) 27 (11/23/17 0915)  BP: 100/63 (11/23/17 0915)  SpO2: 97 % (11/23/17 0915)    Temperature Range Min/Max (Last 24H):  Temp:  [98.3 °F (36.8 °C)-103 °F (39.4 °C)]     Physical Exam:  General: well developed, well nourished  HENT: Head:normocephalic, atraumatic. Ears:not examined. Nose: Nares normal. Septum midline. Mucosa normal. No drainage or sinus tenderness., no discharge. Throat: lips, mucosa, and tongue normal; teeth and gums normal and no throat erythema.  Eyes: conjunctivae/corneas clear. PERRL.   Neck: supple, symmetrical, trachea midline, no JVD and thyroid not enlarged, symmetric, no tenderness/mass/nodules  Lungs:  clear to auscultation bilaterally and normal respiratory effort  Cardiovascular: Heart: regular rate and rhythm, S1, S2 normal, no murmur, click, rub or gallop. Chest Wall: no tenderness. Extremities: no cyanosis or edema, or clubbing. Pulses: 2+ and symmetric.  Abdomen/Rectal: Abdomen: soft, non-tender non-distented; bowel sounds normal; no masses,  no organomegaly. Rectal: not examined  Skin: Skin color, texture, turgor normal. No rashes or lesions  Musculoskeletal:no clubbing, cyanosis  Lymph Nodes: No cervical or supraclavicular adenopathy    Laboratory:  CBC    Recent Labs  Lab 11/23/17  0455   WBC 12.70   RBC 3.98*   HGB 10.5*   HCT 32.1*        BMP    Recent Labs  Lab 11/23/17  0455   CO2 21*   BUN 17   CREATININE 1.1   CALCIUM 7.6*       Recent Labs  Lab 11/22/17  1841   COLORU Yellow   SPECGRAV 1.010   PHUR 5.0   PROTEINUA Negative   BACTERIA None   NITRITE Negative   LEUKOCYTESUR Negative   UROBILINOGEN Negative     Microbiology Results (last 7 days)     Procedure  Component Value Units Date/Time    Blood culture x two cultures. Draw prior to antibiotics. [[id number]] Collected:  11/22/17 1755    Order Status:  Completed Specimen:  Blood from Peripheral, Antecubital, Left Updated:  11/23/17 0312     Blood Culture, Routine No Growth to date    Narrative:       Aerobic and anaerobic    Blood culture x two cultures. Draw prior to antibiotics. [[id number]] Collected:  11/22/17 1820    Order Status:  Completed Specimen:  Blood from Peripheral, Hand, Left Updated:  11/23/17 0312     Blood Culture, Routine No Growth to date    Narrative:       Aerobic and anaerobic    Urine culture [[id number]] Collected:  11/22/17 1841    Order Status:  Sent Specimen:  Urine from Urine, Clean Catch Updated:  11/22/17 1852          Diagnostic Results:  Labs: Reviewed  X-Ray: Reviewed  CT: Reviewed    ASSESSMENT/PLAN:     Active Hospital Problems    Diagnosis  POA    *Septic shock [A41.9, R65.21]  Yes    Hyperlipidemia [E78.5]  Yes     Chronic    Intravenous drug abuse [F19.10]  Yes     Chronic    Anemia of chronic disease [D63.8]  Yes     Chronic    Chronic hepatitis C [B18.2]  Yes     Chronic    Chronic relapsing pancreatitis [K86.1]  Yes     Chronic    Tobacco abuse [Z72.0]  Yes     Chronic    Type 2 diabetes mellitus, uncontrolled [E11.65]  Yes     Chronic      Resolved Hospital Problems    Diagnosis Date Resolved POA   No resolved problems to display.       1. ivdu and fever  tte pending as are blood cultures  Empirical abx  2.h/o chronic recurring pancreatitis. Lipase normal

## 2017-11-23 NOTE — HPI
"Mr. Domenic Mabry is a 52 y.o. male with type 2 diabetes mellitus (HbA1c 8.2% Jul 2016), hyperlipidemia (LDL 86.6 Apr 2016), intravenous drug abuse, anemia of chronic disease, chronic hepatitis C, chronic relapsing pancreatitis, and tobacco abuse who presents to [facility] ED with complaints of chest pain for three days.  The pain has actually been present "for a while" but acutely worsened in the last three days.  The pain is left-sided with radiation to his back, is sharp in quality, and is 10/10 in severity.  There are no alleviating or exacerbating factors, and the pain is not associated with any dyspnea, pleurisy, night sweats, weight loss, fevers, chills, hemoptysis, nor any lower extremity pain or swelling.  He denies any PND nor orthopnea.  He does admit to injecting heroin with his last time about a month ago.  He does that to relieve his back pain.  He has never had similar episodes in the past.  "

## 2017-11-24 PROBLEM — R65.10 SIRS (SYSTEMIC INFLAMMATORY RESPONSE SYNDROME): Status: ACTIVE | Noted: 2017-11-24

## 2017-11-24 LAB
ALBUMIN SERPL BCP-MCNC: 2.3 G/DL
ALP SERPL-CCNC: 82 U/L
ALT SERPL W/O P-5'-P-CCNC: 40 U/L
ANION GAP SERPL CALC-SCNC: 3 MMOL/L
AST SERPL-CCNC: 33 U/L
BACTERIA UR CULT: NO GROWTH
BASOPHILS # BLD AUTO: 0.01 K/UL
BASOPHILS NFR BLD: 0.1 %
BILIRUB SERPL-MCNC: 0.2 MG/DL
BUN SERPL-MCNC: 18 MG/DL
CALCIUM SERPL-MCNC: 7.8 MG/DL
CHLORIDE SERPL-SCNC: 109 MMOL/L
CO2 SERPL-SCNC: 25 MMOL/L
CREAT SERPL-MCNC: 0.8 MG/DL
DIFFERENTIAL METHOD: ABNORMAL
EOSINOPHIL # BLD AUTO: 0 K/UL
EOSINOPHIL NFR BLD: 0.2 %
ERYTHROCYTE [DISTWIDTH] IN BLOOD BY AUTOMATED COUNT: 15.1 %
EST. GFR  (AFRICAN AMERICAN): >60 ML/MIN/1.73 M^2
EST. GFR  (NON AFRICAN AMERICAN): >60 ML/MIN/1.73 M^2
GLUCOSE SERPL-MCNC: 57 MG/DL
HCT VFR BLD AUTO: 30.7 %
HGB BLD-MCNC: 10.1 G/DL
LYMPHOCYTES # BLD AUTO: 1.8 K/UL
LYMPHOCYTES NFR BLD: 17.1 %
MAGNESIUM SERPL-MCNC: 2 MG/DL
MCH RBC QN AUTO: 26.2 PG
MCHC RBC AUTO-ENTMCNC: 32.9 G/DL
MCV RBC AUTO: 80 FL
MONOCYTES # BLD AUTO: 1.1 K/UL
MONOCYTES NFR BLD: 10.1 %
NEUTROPHILS # BLD AUTO: 7.6 K/UL
NEUTROPHILS NFR BLD: 72.5 %
PHOSPHATE SERPL-MCNC: 2.4 MG/DL
PLATELET # BLD AUTO: 186 K/UL
PMV BLD AUTO: 9.4 FL
POCT GLUCOSE: 103 MG/DL (ref 70–110)
POCT GLUCOSE: 199 MG/DL (ref 70–110)
POCT GLUCOSE: 257 MG/DL (ref 70–110)
POCT GLUCOSE: 263 MG/DL (ref 70–110)
POCT GLUCOSE: 44 MG/DL (ref 70–110)
POTASSIUM SERPL-SCNC: 3.5 MMOL/L
PROT SERPL-MCNC: 5.7 G/DL
RBC # BLD AUTO: 3.85 M/UL
SODIUM SERPL-SCNC: 137 MMOL/L
VANCOMYCIN TROUGH SERPL-MCNC: 10.8 UG/ML
WBC # BLD AUTO: 10.43 K/UL

## 2017-11-24 PROCEDURE — 25000003 PHARM REV CODE 250: Performed by: HOSPITALIST

## 2017-11-24 PROCEDURE — 93005 ELECTROCARDIOGRAM TRACING: CPT

## 2017-11-24 PROCEDURE — 85025 COMPLETE CBC W/AUTO DIFF WBC: CPT

## 2017-11-24 PROCEDURE — 25000003 PHARM REV CODE 250: Performed by: EMERGENCY MEDICINE

## 2017-11-24 PROCEDURE — S4991 NICOTINE PATCH NONLEGEND: HCPCS | Performed by: INTERNAL MEDICINE

## 2017-11-24 PROCEDURE — 63600175 PHARM REV CODE 636 W HCPCS: Performed by: EMERGENCY MEDICINE

## 2017-11-24 PROCEDURE — 80202 ASSAY OF VANCOMYCIN: CPT

## 2017-11-24 PROCEDURE — 25000003 PHARM REV CODE 250: Performed by: INTERNAL MEDICINE

## 2017-11-24 PROCEDURE — 21400001 HC TELEMETRY ROOM

## 2017-11-24 PROCEDURE — 63600175 PHARM REV CODE 636 W HCPCS: Performed by: INTERNAL MEDICINE

## 2017-11-24 PROCEDURE — 36415 COLL VENOUS BLD VENIPUNCTURE: CPT

## 2017-11-24 PROCEDURE — 83735 ASSAY OF MAGNESIUM: CPT

## 2017-11-24 PROCEDURE — 84100 ASSAY OF PHOSPHORUS: CPT

## 2017-11-24 PROCEDURE — 80053 COMPREHEN METABOLIC PANEL: CPT

## 2017-11-24 RX ORDER — SODIUM CHLORIDE 9 MG/ML
INJECTION, SOLUTION INTRAVENOUS CONTINUOUS
Status: ACTIVE | OUTPATIENT
Start: 2017-11-24 | End: 2017-11-25

## 2017-11-24 RX ADMIN — Medication 16 G: at 06:11

## 2017-11-24 RX ADMIN — INSULIN ASPART 10 UNITS: 100 INJECTION, SOLUTION INTRAVENOUS; SUBCUTANEOUS at 04:11

## 2017-11-24 RX ADMIN — OXYCODONE HYDROCHLORIDE 5 MG: 5 TABLET ORAL at 11:11

## 2017-11-24 RX ADMIN — Medication 24 G: at 06:11

## 2017-11-24 RX ADMIN — CEFEPIME HYDROCHLORIDE 2 G: 2 INJECTION, SOLUTION INTRAVENOUS at 10:11

## 2017-11-24 RX ADMIN — HYDROMORPHONE HYDROCHLORIDE 1 MG: 2 INJECTION, SOLUTION INTRAMUSCULAR; INTRAVENOUS; SUBCUTANEOUS at 08:11

## 2017-11-24 RX ADMIN — CEFEPIME HYDROCHLORIDE 2 G: 2 INJECTION, SOLUTION INTRAVENOUS at 02:11

## 2017-11-24 RX ADMIN — INSULIN ASPART 1 UNITS: 100 INJECTION, SOLUTION INTRAVENOUS; SUBCUTANEOUS at 09:11

## 2017-11-24 RX ADMIN — VANCOMYCIN HYDROCHLORIDE 1000 MG: 1 INJECTION, POWDER, LYOPHILIZED, FOR SOLUTION INTRAVENOUS at 10:11

## 2017-11-24 RX ADMIN — HYDROMORPHONE HYDROCHLORIDE 1 MG: 2 INJECTION, SOLUTION INTRAMUSCULAR; INTRAVENOUS; SUBCUTANEOUS at 01:11

## 2017-11-24 RX ADMIN — HEPARIN SODIUM 5000 UNITS: 5000 INJECTION, SOLUTION INTRAVENOUS; SUBCUTANEOUS at 08:11

## 2017-11-24 RX ADMIN — HEPARIN SODIUM 5000 UNITS: 5000 INJECTION, SOLUTION INTRAVENOUS; SUBCUTANEOUS at 09:11

## 2017-11-24 RX ADMIN — HYDROMORPHONE HYDROCHLORIDE 1 MG: 2 INJECTION, SOLUTION INTRAMUSCULAR; INTRAVENOUS; SUBCUTANEOUS at 02:11

## 2017-11-24 RX ADMIN — RAMELTEON 8 MG: 8 TABLET, FILM COATED ORAL at 09:11

## 2017-11-24 RX ADMIN — VANCOMYCIN HYDROCHLORIDE 1000 MG: 1 INJECTION, POWDER, LYOPHILIZED, FOR SOLUTION INTRAVENOUS at 09:11

## 2017-11-24 RX ADMIN — INSULIN ASPART 10 UNITS: 100 INJECTION, SOLUTION INTRAVENOUS; SUBCUTANEOUS at 11:11

## 2017-11-24 RX ADMIN — SODIUM CHLORIDE: 0.9 INJECTION, SOLUTION INTRAVENOUS at 10:11

## 2017-11-24 RX ADMIN — NICOTINE 1 PATCH: 21 PATCH, EXTENDED RELEASE TRANSDERMAL at 08:11

## 2017-11-24 RX ADMIN — INSULIN ASPART 3 UNITS: 100 INJECTION, SOLUTION INTRAVENOUS; SUBCUTANEOUS at 11:11

## 2017-11-24 RX ADMIN — CEFEPIME HYDROCHLORIDE 2 G: 2 INJECTION, SOLUTION INTRAVENOUS at 06:11

## 2017-11-24 RX ADMIN — OXYCODONE HYDROCHLORIDE 5 MG: 5 TABLET ORAL at 06:11

## 2017-11-24 RX ADMIN — ATORVASTATIN CALCIUM 40 MG: 40 TABLET, FILM COATED ORAL at 08:11

## 2017-11-24 NOTE — PLAN OF CARE
Problem: Patient Care Overview  Goal: Plan of Care Review  Vitals signs stable. Pt has been off Levophed since 0230 on 11-23-17. Last B/P is 96/55.  Chronic pain managed with PRN meds. MRI lumber spine ordered as routine. Last blood glucose was 191. Levemir 20 units given . Afebrile. Fluids infusing and will D/C at 0530 per MD order. CVP 6-7. No injuries or falls this shift.  Pt's blood glucose dropped to 42 this morning.  Pt was given PRN glucose po.  Rechecked blood glucose and it was 103,

## 2017-11-24 NOTE — ASSESSMENT & PLAN NOTE
Poorly-controlled on a home regimen of basal-prandial insulin therapy; will provide basal-prandial insulin along with insulin sliding scale.adjusted insulin.

## 2017-11-24 NOTE — ASSESSMENT & PLAN NOTE
Patient does meet criteria for SIRS given his fever, tachycardia, suspected endocarditis, and admitted IV drug use, though he only meets 2 minor criteria at this time.    He has been persistently hypotensive despite a more-than-adequate IV fluid challenge and has ultimately required vasopressor support.  Otherwise, his urinalysis, chest radiograph, and his history does not support any other evidence of infection. No growths in cultures yet,TTE show no vegetation,will do MRI lumbar duo to worsening back pain,off levophed,stable on IVF.will continue monitor,ID is on case.

## 2017-11-24 NOTE — CONSULTS
Consult Note  Infectious Disease    Consult Requested By: Kalyani Alberto MD    Reason for Consult: back pain and fever    SUBJECTIVE:     History of Present Illness:  Patient is a 52 y.o. male presents with  Fever of 103. He is an ivdu and was out from rehab 2 weeks ago. He thinks he last shot up 3 weeks ago. He uses heroin. He now has a fever and back pain. He is also known to have chronic pancreatitis. blood cultures and echo pending. Wbc is normal as is lipase. He states he has hep c. hiv not done yet.ct abdo shows an enlarged liver, rt femoral lucency with sclerotic margins in the intertrochanteric region of unknown etiology.    Past Medical History:   Diagnosis Date    Diabetes mellitus     Hepatitis     HTN (hypertension)     Pancreatitis      Past Surgical History:   Procedure Laterality Date    [facility]    SHOULDER SURGERY  2013    right shoulder, spur removal.     Family History   Problem Relation Age of Onset    No Known Problems Mother      Social History   Substance Use Topics    Smoking status: Current Every Day Smoker     Packs/day: 1.00     Years: 20.00     Types: Cigarettes     Start date: 4/3/1981    Smokeless tobacco: Never Used    Alcohol use No       Review of patient's allergies indicates:  No Known Allergies     Antibiotics     Start     Stop Route Frequency Ordered    11/23/17 1000  vancomycin 1 g in dextrose 5 % 250 mL IVPB (ready to mix system)  (Vancomycin IVPB with levels panel)      -- IV Every 12 hours (non-standard times) 11/23/17 0413    11/23/17 0245  ceFEPIme in dextrose 5% 2 gram/50 mL IVPB 2 g      -- IV Every 8 hours (non-standard times) 11/23/17 0239          Review of Systems:  Constitutional: positive for chills, fatigue and fevers  Eyes: no visual changes  ENT: no nasal congestion or sore throat  Respiratory: no cough or shortness of breath  Cardiovascular: no chest pain or palpitations  Gastrointestinal: no  nausea or vomiting, no abdominal pain or change in bowel habits  Genitourinary: no hematuria or dysuria  Hematologic/Lymphatic: no easy bruising or lymphadenopathy  Musculoskeletal: back pain    OBJECTIVE:     Vital Signs (Most Recent)  Temp: 98.6 °F (37 °C) (11/24/17 0800)  Pulse: 80 (11/24/17 0900)  Resp: 19 (11/24/17 0900)  BP: (!) 108/55 (11/24/17 0900)  SpO2: 98 % (11/24/17 0900)    Temperature Range Min/Max (Last 24H):  Temp:  [97.7 °F (36.5 °C)-98.6 °F (37 °C)]     Physical Exam:  General: well developed, well nourished  HENT: Head:normocephalic, atraumatic. Ears:not examined. Nose: Nares normal. Septum midline. Mucosa normal. No drainage or sinus tenderness., no discharge. Throat: lips, mucosa, and tongue normal; teeth and gums normal and no throat erythema.  Eyes: conjunctivae/corneas clear. PERRL.   Neck: supple, symmetrical, trachea midline, no JVD and thyroid not enlarged, symmetric, no tenderness/mass/nodules  Lungs:  clear to auscultation bilaterally and normal respiratory effort  Cardiovascular: Heart: regular rate and rhythm, S1, S2 normal, no murmur, click, rub or gallop. Chest Wall: no tenderness. Extremities: no cyanosis or edema, or clubbing. Pulses: 2+ and symmetric.  Abdomen/Rectal: Abdomen: soft, non-tender non-distented; bowel sounds normal; no masses,  no organomegaly. Rectal: not examined  Skin: Skin color, texture, turgor normal. No rashes or lesions  Musculoskeletal:no clubbing, cyanosis  Lymph Nodes: No cervical or supraclavicular adenopathy    Laboratory:  CBC    Recent Labs  Lab 11/24/17  0410   WBC 10.43   RBC 3.85*   HGB 10.1*   HCT 30.7*        BMP    Recent Labs  Lab 11/24/17  0410   CO2 25   BUN 18   CREATININE 0.8   CALCIUM 7.8*       Recent Labs  Lab 11/22/17  1841   COLORU Yellow   SPECGRAV 1.010   PHUR 5.0   PROTEINUA Negative   BACTERIA None   NITRITE Negative   LEUKOCYTESUR Negative   UROBILINOGEN Negative     Microbiology Results (last 7 days)     Procedure Component  Value Units Date/Time    Blood culture x two cultures. Draw prior to antibiotics. [[id number]] Collected:  11/22/17 1820    Order Status:  Completed Specimen:  Blood from Peripheral, Hand, Left Updated:  11/23/17 2103     Blood Culture, Routine No Growth to date     Blood Culture, Routine No Growth to date    Narrative:       Aerobic and anaerobic    Blood culture x two cultures. Draw prior to antibiotics. [[id number]] Collected:  11/22/17 1755    Order Status:  Completed Specimen:  Blood from Peripheral, Antecubital, Left Updated:  11/23/17 2103     Blood Culture, Routine No Growth to date     Blood Culture, Routine No Growth to date    Narrative:       Aerobic and anaerobic    Urine culture [[id number]] Collected:  11/22/17 1841    Order Status:  Completed Specimen:  Urine from Urine, Clean Catch Updated:  11/23/17 1036     Urine Culture, Routine No growth to date          Diagnostic Results:  Labs: Reviewed  X-Ray: Reviewed  CT: Reviewed    ASSESSMENT/PLAN:     Active Hospital Problems    Diagnosis  POA    *Septic shock [A41.9, R65.21]  Yes    Hyperlipidemia [E78.5]  Yes     Chronic    Intravenous drug abuse [F19.10]  Yes     Chronic    Anemia of chronic disease [D63.8]  Yes     Chronic    Chronic hepatitis C [B18.2]  Yes     Chronic    Chronic relapsing pancreatitis [K86.1]  Yes     Chronic    Tobacco abuse [Z72.0]  Yes     Chronic    Type 2 diabetes mellitus, uncontrolled [E11.65]  Yes     Chronic      Resolved Hospital Problems    Diagnosis Date Resolved POA   No resolved problems to display.       1. ivdu and fever  tte  negative as are blood cultures  Empirical abx to dc in am if nothing shows up on lumbar mri  2.h/o chronic recurring pancreatitis. Lipase normal  3. Lucency femoral neck  Check spep

## 2017-11-24 NOTE — HOSPITAL COURSE
"Mr. Domenic Mabry is a 52 y.o. male with type 2 diabetes mellitus (HbA1c 8.2% Jul 2016), hyperlipidemia (LDL 86.6 Apr 2016), intravenous drug abuse, anemia of chronic disease, chronic hepatitis C, chronic relapsing pancreatitis, and tobacco abuse who presents to [facility] ED with complaints of chest pain for three days.  The pain has actually been present "for a while" but acutely worsened in the last three days.  The pain is left-sided with radiation to his back, is sharp in quality, and is 10/10 in severity.  There are no alleviating or exacerbating factors, and the pain is not associated with any dyspnea, pleurisy, night sweats, weight loss, fevers, chills, hemoptysis, nor any lower extremity pain or swelling.  He denies any PND nor orthopnea.  He does admit to injecting heroin with his last time about a month ago.  He does that to relieve his back pain.  He has never had similar episodes in the past.he was in shock,possiblt septic shock,and concern for endocarditis,he has kayla started on broad spectrum IV Abx,and iD has been consulted,no growth in cultures yet and TTE show no sign of vegetation.complain of severe back pain,ordered MRI to R/O discitis,shock is resolved,off levophed,and stable for transfer to floor.    Pt MRI did not show evidence of osteo, ok to dc home.  Pt long h/o drug abuse and many rehab stays. He thinks another try at rehab would be useless and will try sobriety on his own. He was given outpatient resources, all of which he has tried in past. Asked to follow up PCP as scheduled.   "

## 2017-11-24 NOTE — NURSING TRANSFER
Nursing Transfer Note      11/24/2017     Transfer To: tele    Transfer via wheelchair    Transfer with cardiac monitoring    Transported by RN and transport    Medicines sent: yes    Chart send with patient: Yes    Notified: per pt     Patient reassessed at: 1111 11/24/17     Upon arrival to floor: cardiac monitor applied, patient oriented to room, call bell in reach and bed in lowest position

## 2017-11-24 NOTE — SUBJECTIVE & OBJECTIVE
Past Medical History:   Diagnosis Date    Diabetes mellitus     Hepatitis     HTN (hypertension)     Pancreatitis        Past Surgical History:   Procedure Laterality Date    CARDIAC SURGERY  1999    stent placed. ([facility])    SHOULDER SURGERY  2013    right shoulder, spur removal.       Review of patient's allergies indicates:  No Known Allergies    No current facility-administered medications on file prior to encounter.      Current Outpatient Prescriptions on File Prior to Encounter   Medication Sig    atorvastatin (LIPITOR) 40 MG tablet Take 40 mg by mouth once daily.    gabapentin (NEURONTIN) 300 MG capsule     HUMALOG 100 unit/mL injection     LANTUS 100 unit/mL injection     lidocaine 4 % PtMd Apply 1 patch topically once daily.    nabumetone (RELAFEN) 750 MG tablet Take 1 tablet (750 mg total) by mouth 2 (two) times daily.    NOVOLOG 100 unit/mL injection Inject into the skin. Per sliding scale    sulindac (CLINORIL) 200 MG Tab Take 1 tablet (200 mg total) by mouth 2 (two) times daily.    diazePAM (VALIUM) 5 MG tablet Take 1 tablet (5 mg total) by mouth every 8 (eight) hours as needed for Anxiety.     Family History     Problem Relation (Age of Onset)    No Known Problems Mother        Social History Main Topics    Smoking status: Current Every Day Smoker     Packs/day: 1.00     Years: 20.00     Types: Cigarettes     Start date: 4/3/1981    Smokeless tobacco: Never Used    Alcohol use No    Drug use:      Types: IV      Comment: very long time ago    Sexual activity: Yes     Partners: Female     Review of Systems   Constitutional: Positive for fatigue and unexpected weight change. Negative for activity change, appetite change, chills, diaphoresis and fever.   HENT: Negative.    Eyes: Negative.    Respiratory: Negative for cough, chest tightness, shortness of breath and wheezing.    Cardiovascular: Negative for chest pain, palpitations and leg swelling.   Gastrointestinal:  Negative for abdominal distention, abdominal pain, blood in stool, constipation, diarrhea, nausea and vomiting.   Genitourinary: Negative for dysuria and hematuria.   Musculoskeletal: Negative.    Skin: Negative.    Neurological: Negative for dizziness, seizures, syncope, weakness and light-headedness.   Psychiatric/Behavioral: Negative.      Objective:     Vital Signs (Most Recent):  Temp: 97.7 °F (36.5 °C) (11/24/17 0400)  Pulse: 82 (11/24/17 0600)  Resp: 17 (11/24/17 0600)  BP: 138/70 (11/24/17 0600)  SpO2: 98 % (11/24/17 0600) Vital Signs (24h Range):  Temp:  [97.7 °F (36.5 °C)-98.2 °F (36.8 °C)] 97.7 °F (36.5 °C)  Pulse:  [77-95] 82  Resp:  [15-31] 17  SpO2:  [96 %-100 %] 98 %  BP: ()/(50-71) 138/70     Weight: 58.6 kg (129 lb 3 oz)  Body mass index is 19.64 kg/m².    Physical Exam   Constitutional: He is oriented to person, place, and time. He appears well-developed and well-nourished. No distress.   HENT:   Head: Normocephalic and atraumatic.   Right Ear: External ear normal.   Left Ear: External ear normal.   Nose: Nose normal.   Eyes: Right eye exhibits no discharge. Left eye exhibits no discharge.   Neck: Normal range of motion.   Cardiovascular: Normal rate, regular rhythm, normal heart sounds and intact distal pulses.  Exam reveals no gallop and no friction rub.    No murmur heard.  Pulmonary/Chest: Effort normal and breath sounds normal. No respiratory distress. He has no wheezes. He has no rales. He exhibits no tenderness.   Abdominal: Soft. Bowel sounds are normal. He exhibits no distension. There is no tenderness. There is no rebound and no guarding.   Musculoskeletal: Normal range of motion. He exhibits no edema.   Neurological: He is alert and oriented to person, place, and time.   Skin: Skin is warm and dry. He is not diaphoretic. No erythema.   Psychiatric: He has a normal mood and affect. His behavior is normal. Judgment and thought content normal.   Nursing note and vitals reviewed.        Significant Labs: All pertinent labs within the past 24 hours have been reviewed.    Significant Imaging: I have reviewed and interpreted all pertinent imaging results/findings within the past 24 hours.

## 2017-11-24 NOTE — PROGRESS NOTES
"[facility] Hospital Medicine  Progress Note    Patient Name: Domenic Mabry  MRN: [id number]  Patient Class: IP- Inpatient   Admission Date: 11/22/2017  Length of Stay: 1 days  Attending Physician: Kalyani Alberto MD  Primary Care Provider: Jacob Montanez MD        Subjective:     Principal Problem:Septic shock    HPI:  Mr. Domenic Mabry is a 52 y.o. male with type 2 diabetes mellitus (HbA1c 8.2% Jul 2016), hyperlipidemia (LDL 86.6 Apr 2016), intravenous drug abuse, anemia of chronic disease, chronic hepatitis C, chronic relapsing pancreatitis, and tobacco abuse who presents to [facility] ED with complaints of chest pain for three days.  The pain has actually been present "for a while" but acutely worsened in the last three days.  The pain is left-sided with radiation to his back, is sharp in quality, and is 10/10 in severity.  There are no alleviating or exacerbating factors, and the pain is not associated with any dyspnea, pleurisy, night sweats, weight loss, fevers, chills, hemoptysis, nor any lower extremity pain or swelling.  He denies any PND nor orthopnea.  He does admit to injecting heroin with his last time about a month ago.  He does that to relieve his back pain.  He has never had similar episodes in the past.    Hospital Course:  Mr. Domenic Mabry is a 52 y.o. male with type 2 diabetes mellitus (HbA1c 8.2% Jul 2016), hyperlipidemia (LDL 86.6 Apr 2016), intravenous drug abuse, anemia of chronic disease, chronic hepatitis C, chronic relapsing pancreatitis, and tobacco abuse who presents to [facility] ED with complaints of chest pain for three days.  The pain has actually been present "for a while" but acutely worsened in the last three days.  The pain is left-sided with radiation to his back, is sharp in quality, and is 10/10 in severity.  There are no alleviating or exacerbating factors, and the pain is not associated with any dyspnea, pleurisy, night sweats, weight loss, " fevers, chills, hemoptysis, nor any lower extremity pain or swelling.  He denies any PND nor orthopnea.  He does admit to injecting heroin with his last time about a month ago.  He does that to relieve his back pain.  He has never had similar episodes in the past.he was in shock,possiblt septic shock,and concern for endocarditis,he has kayla started on broad spectrum IV Abx,and iD has been consulted,no growth in cultures yet and TTE show no sign of vegetation.complain of severe back pain,ordered MRI to R/O discitis,shock is resolved,off levophed,and stable for transfer to floor.    Past Medical History:   Diagnosis Date    Diabetes mellitus     Hepatitis     HTN (hypertension)     Pancreatitis        Past Surgical History:   Procedure Laterality Date    CARDIAC SURGERY  1999    stent placed. ([facility])    SHOULDER SURGERY  2013    right shoulder, spur removal.       Review of patient's allergies indicates:  No Known Allergies    No current facility-administered medications on file prior to encounter.      Current Outpatient Prescriptions on File Prior to Encounter   Medication Sig    atorvastatin (LIPITOR) 40 MG tablet Take 40 mg by mouth once daily.    gabapentin (NEURONTIN) 300 MG capsule     HUMALOG 100 unit/mL injection     LANTUS 100 unit/mL injection     lidocaine 4 % PtMd Apply 1 patch topically once daily.    nabumetone (RELAFEN) 750 MG tablet Take 1 tablet (750 mg total) by mouth 2 (two) times daily.    NOVOLOG 100 unit/mL injection Inject into the skin. Per sliding scale    sulindac (CLINORIL) 200 MG Tab Take 1 tablet (200 mg total) by mouth 2 (two) times daily.    diazePAM (VALIUM) 5 MG tablet Take 1 tablet (5 mg total) by mouth every 8 (eight) hours as needed for Anxiety.     Family History     Problem Relation (Age of Onset)    No Known Problems Mother        Social History Main Topics    Smoking status: Current Every Day Smoker     Packs/day: 1.00     Years: 20.00     Types:  Cigarettes     Start date: 4/3/1981    Smokeless tobacco: Never Used    Alcohol use No    Drug use:      Types: IV      Comment: very long time ago    Sexual activity: Yes     Partners: Female     Review of Systems   Constitutional: Positive for fatigue and unexpected weight change. Negative for activity change, appetite change, chills, diaphoresis and fever.   HENT: Negative.    Eyes: Negative.    Respiratory: Negative for cough, chest tightness, shortness of breath and wheezing.    Cardiovascular: Negative for chest pain, palpitations and leg swelling.   Gastrointestinal: Negative for abdominal distention, abdominal pain, blood in stool, constipation, diarrhea, nausea and vomiting.   Genitourinary: Negative for dysuria and hematuria.   Musculoskeletal: Negative.    Skin: Negative.    Neurological: Negative for dizziness, seizures, syncope, weakness and light-headedness.   Psychiatric/Behavioral: Negative.      Objective:     Vital Signs (Most Recent):  Temp: 97.7 °F (36.5 °C) (11/24/17 0400)  Pulse: 82 (11/24/17 0600)  Resp: 17 (11/24/17 0600)  BP: 138/70 (11/24/17 0600)  SpO2: 98 % (11/24/17 0600) Vital Signs (24h Range):  Temp:  [97.7 °F (36.5 °C)-98.2 °F (36.8 °C)] 97.7 °F (36.5 °C)  Pulse:  [77-95] 82  Resp:  [15-31] 17  SpO2:  [96 %-100 %] 98 %  BP: ()/(50-71) 138/70     Weight: 58.6 kg (129 lb 3 oz)  Body mass index is 19.64 kg/m².    Physical Exam   Constitutional: He is oriented to person, place, and time. He appears well-developed and well-nourished. No distress.   HENT:   Head: Normocephalic and atraumatic.   Right Ear: External ear normal.   Left Ear: External ear normal.   Nose: Nose normal.   Eyes: Right eye exhibits no discharge. Left eye exhibits no discharge.   Neck: Normal range of motion.   Cardiovascular: Normal rate, regular rhythm, normal heart sounds and intact distal pulses.  Exam reveals no gallop and no friction rub.    No murmur heard.  Pulmonary/Chest: Effort normal and breath  sounds normal. No respiratory distress. He has no wheezes. He has no rales. He exhibits no tenderness.   Abdominal: Soft. Bowel sounds are normal. He exhibits no distension. There is no tenderness. There is no rebound and no guarding.   Musculoskeletal: Normal range of motion. He exhibits no edema.   Neurological: He is alert and oriented to person, place, and time.   Skin: Skin is warm and dry. He is not diaphoretic. No erythema.   Psychiatric: He has a normal mood and affect. His behavior is normal. Judgment and thought content normal.   Nursing note and vitals reviewed.       Significant Labs: All pertinent labs within the past 24 hours have been reviewed.    Significant Imaging: I have reviewed and interpreted all pertinent imaging results/findings within the past 24 hours.    Assessment/Plan:      * Septic shock    Patient does meet criteria for SIRS given his fever, tachycardia, suspected endocarditis, and admitted IV drug use, though he only meets 2 minor criteria at this time.    He has been persistently hypotensive despite a more-than-adequate IV fluid challenge and has ultimately required vasopressor support.  Otherwise, his urinalysis, chest radiograph, and his history does not support any other evidence of infection. No growths in cultures yet,TTE show no vegetation,will do MRI lumbar duo to worsening back pain,off levophed,stable on IVF.will continue monitor,ID is on case.        Tobacco abuse    Patient was counseled on smoking cessation and he will be provided a nicotine transdermal patch applied while inpatient.  Will provide additional smoking cessation counseling prior to discharge.        Chronic relapsing pancreatitis    Stable; there are no acute issues.has normal lipase.r        Chronic hepatitis C    Stable; there are no acute issues.        Anemia of chronic disease    The patient's H/H is stable and consistent with previous laboratory measurements, and the patient exhibits no signs or  symptoms of acute bleeding; there is no indication for transfusion.  Will continue to monitor.        Intravenous drug abuse    As addressed above.        Hyperlipidemia    Well-controlled; will continue patient's home regimen of atorvastatin.        Type 2 diabetes mellitus, uncontrolled    Poorly-controlled on a home regimen of basal-prandial insulin therapy; will provide basal-prandial insulin along with insulin sliding scale.adjusted insulin.          VTE Risk Mitigation         Ordered     heparin (porcine) injection 5,000 Units  Every 12 hours     Route:  Subcutaneous        11/23/17 0524     Medium Risk of VTE  Once      11/23/17 0524          Critical care time spent on the evaluation and treatment of severe organ dysfunction, review of pertinent labs and imaging studies, discussions with consulting providers and discussions with patient/family: 30  minutes.    Kalyani Alberto MD  Department of Hospital Medicine   [facility]

## 2017-11-24 NOTE — CARE UPDATE
"Mr. Domenic Mabry is a 52 y.o. male with type 2 diabetes mellitus (HbA1c 8.2% Jul 2016), hyperlipidemia (LDL 86.6 Apr 2016), intravenous drug abuse, anemia of chronic disease, chronic hepatitis C, chronic relapsing pancreatitis, and tobacco abuse who presents to [facility] ED with complaints of chest pain for three days.  The pain has actually been present "for a while" but acutely worsened in the last three days.  The pain is left-sided with radiation to his back, is sharp in quality, and is 10/10 in severity.  There are no alleviating or exacerbating factors, and the pain is not associated with any dyspnea, pleurisy, night sweats, weight loss, fevers, chills, hemoptysis, nor any lower extremity pain or swelling.  He denies any PND nor orthopnea.  He does admit to injecting heroin with his last time about a month ago.  He does that to relieve his back pain.  He has never had similar episodes in the past.he was in shock,septic,possiblt septic shock,and concern for endocarditis,he has kayla started on broad spectrum IV Abx,and iD has been consulted,no growth in cultures yet and TTE show no sign of vegetation.complain of severe back pain,ordered MRI to R/O discitis,shock is resolved,off levophed,and stable for transfer to floor.will continue monitor.  "

## 2017-11-24 NOTE — PROGRESS NOTES
0900: notified MD Henderson of pt's CBG of 44 this AM and of interventions done insulin adjusted per MD and AM scheduled dose held.    1047: discussed with MD Henderson, pt okay to got to MRI without RN and cardiac monitor.

## 2017-11-24 NOTE — PLAN OF CARE
Patient transferred from ICU.  Patient reported that he plans to discharge to home. Previous information in the record indicated that patient had been a substance abuse treatment program at [facility] [place].  Patient stated that he is not in any program and plans to discharge to home.  Whiteboard updated with SW's name and contact number.        11/24/17 7692   Discharge Reassessment   Assessment Type Discharge Planning Reassessment   Provided patient/caregiver education on the expected discharge date and the discharge plan No   Do you have any problems affording any of your prescribed medications? No   Discharge Plan A Home with family   Discharge Plan B Home with family   Patient choice form signed by patient/caregiver N/A   Can the patient answer the patient profile reliably? Yes, cognitively intact   Describe the patient's ability to walk at the present time. No restrictions   During the past month, has the patient often been bothered by feeling down, depressed or hopeless? No   During the past month, has the patient often been bothered by little interest or pleasure in doing things? Yes   Cheyanne Feldman LMSW, [facility], [facility]  11/24/2017

## 2017-11-24 NOTE — PROGRESS NOTES
"Unable to conduct assessment because patient is demanding IV pain medication; Patient using profanity and "I will walk out of here if I don't get my medicine for pain, I knew that this would happen." Will refer to supervisor;   "

## 2017-11-24 NOTE — PLAN OF CARE
Problem: Diabetes, Type 2 (Adult)  Intervention: Support/Optimize Psychosocial Response to Condition   11/24/17 160   Coping/Psychosocial Interventions   Supportive Measures active listening utilized   Environmental Support calm environment promoted;environmental consistency promoted;rest periods encouraged       Goal: Signs and Symptoms of Listed Potential Problems Will be Absent, Minimized or Managed (Diabetes, Type 2)  Signs and symptoms of listed potential problems will be absent, minimized or managed by discharge/transition of care (reference Diabetes, Type 2 (Adult) CPG).    11/24/17 5377   Diabetes, Type 2   Problems Assessed (Type 2 Diabetes) hyperglycemia;situational response   Problems Present (Type 2 Diabetes) hyperglycemia;hypoglycemia;situational response

## 2017-11-25 VITALS
TEMPERATURE: 99 F | HEIGHT: 68 IN | WEIGHT: 132.5 LBS | DIASTOLIC BLOOD PRESSURE: 72 MMHG | SYSTOLIC BLOOD PRESSURE: 119 MMHG | HEART RATE: 88 BPM | RESPIRATION RATE: 18 BRPM | BODY MASS INDEX: 20.08 KG/M2 | OXYGEN SATURATION: 96 %

## 2017-11-25 PROBLEM — R65.21 SEPTIC SHOCK: Status: RESOLVED | Noted: 2017-11-23 | Resolved: 2017-11-25

## 2017-11-25 PROBLEM — R65.10 SIRS (SYSTEMIC INFLAMMATORY RESPONSE SYNDROME): Status: RESOLVED | Noted: 2017-11-24 | Resolved: 2017-11-25

## 2017-11-25 PROBLEM — A41.9 SEPTIC SHOCK: Status: RESOLVED | Noted: 2017-11-23 | Resolved: 2017-11-25

## 2017-11-25 LAB
HIV1+2 IGG SERPL QL IA.RAPID: NEGATIVE
POCT GLUCOSE: 333 MG/DL (ref 70–110)
POCT GLUCOSE: 382 MG/DL (ref 70–110)

## 2017-11-25 PROCEDURE — 63600175 PHARM REV CODE 636 W HCPCS: Performed by: EMERGENCY MEDICINE

## 2017-11-25 PROCEDURE — 25000003 PHARM REV CODE 250: Performed by: INTERNAL MEDICINE

## 2017-11-25 PROCEDURE — S4991 NICOTINE PATCH NONLEGEND: HCPCS | Performed by: INTERNAL MEDICINE

## 2017-11-25 PROCEDURE — 86703 HIV-1/HIV-2 1 RESULT ANTBDY: CPT

## 2017-11-25 PROCEDURE — 36415 COLL VENOUS BLD VENIPUNCTURE: CPT

## 2017-11-25 PROCEDURE — 63600175 PHARM REV CODE 636 W HCPCS: Performed by: INTERNAL MEDICINE

## 2017-11-25 PROCEDURE — 84165 PROTEIN E-PHORESIS SERUM: CPT | Mod: 26,,, | Performed by: PATHOLOGY

## 2017-11-25 PROCEDURE — 84165 PROTEIN E-PHORESIS SERUM: CPT

## 2017-11-25 RX ORDER — IBUPROFEN 800 MG/1
800 TABLET ORAL 3 TIMES DAILY
Qty: 90 TABLET | Refills: 0 | Status: SHIPPED | OUTPATIENT
Start: 2017-11-25 | End: 2017-12-25

## 2017-11-25 RX ORDER — INSULIN GLARGINE 100 [IU]/ML
15 INJECTION, SOLUTION SUBCUTANEOUS 2 TIMES DAILY
Qty: 9 ML | Refills: 0 | Status: ON HOLD
Start: 2017-11-25 | End: 2020-04-02

## 2017-11-25 RX ORDER — GABAPENTIN 300 MG/1
300 CAPSULE ORAL 2 TIMES DAILY
Qty: 60 CAPSULE | Refills: 0 | Status: ON HOLD | OUTPATIENT
Start: 2017-11-25 | End: 2019-03-17 | Stop reason: HOSPADM

## 2017-11-25 RX ORDER — INSULIN ASPART 100 [IU]/ML
10 INJECTION, SOLUTION INTRAVENOUS; SUBCUTANEOUS 3 TIMES DAILY
Qty: 9 ML | Refills: 11 | Status: ON HOLD
Start: 2017-11-25 | End: 2019-03-17 | Stop reason: HOSPADM

## 2017-11-25 RX ORDER — OMEPRAZOLE 40 MG/1
40 CAPSULE, DELAYED RELEASE ORAL DAILY
Qty: 30 CAPSULE | Refills: 11 | Status: ON HOLD | OUTPATIENT
Start: 2017-11-25 | End: 2020-03-27

## 2017-11-25 RX ORDER — IBUPROFEN 200 MG
1 TABLET ORAL DAILY
Refills: 0 | Status: ON HOLD | COMMUNITY
Start: 2017-11-26 | End: 2020-03-27

## 2017-11-25 RX ADMIN — NICOTINE 1 PATCH: 21 PATCH, EXTENDED RELEASE TRANSDERMAL at 08:11

## 2017-11-25 RX ADMIN — INSULIN ASPART 10 UNITS: 100 INJECTION, SOLUTION INTRAVENOUS; SUBCUTANEOUS at 11:11

## 2017-11-25 RX ADMIN — INSULIN ASPART 10 UNITS: 100 INJECTION, SOLUTION INTRAVENOUS; SUBCUTANEOUS at 07:11

## 2017-11-25 RX ADMIN — ATORVASTATIN CALCIUM 40 MG: 40 TABLET, FILM COATED ORAL at 08:11

## 2017-11-25 RX ADMIN — HEPARIN SODIUM 5000 UNITS: 5000 INJECTION, SOLUTION INTRAVENOUS; SUBCUTANEOUS at 08:11

## 2017-11-25 RX ADMIN — HYDROMORPHONE HYDROCHLORIDE 1 MG: 2 INJECTION, SOLUTION INTRAMUSCULAR; INTRAVENOUS; SUBCUTANEOUS at 08:11

## 2017-11-25 RX ADMIN — CEFEPIME HYDROCHLORIDE 2 G: 2 INJECTION, SOLUTION INTRAVENOUS at 03:11

## 2017-11-25 NOTE — NURSING
"Discharge instructions given to patient. Patient verbalized understanding of instructions. Patient states he will not comply. States " I'm going to get some cigarettes as soon as I'm out of here". When going over medications he replies with if he will or will not. States he will probably not stop taking his valium. Refused flu shot. Central line removed. Tele monitoring removed.     "

## 2017-11-25 NOTE — PLAN OF CARE
11/25/17 1235   Final Note   Assessment Type Final Discharge Note   Discharge Disposition Home   What phone number can be called within the next 1-3 days to see how you are doing after discharge? [phone]   Hospital Follow Up  Appt(s) scheduled? No   Discharge plans and expectations educations in teach back method with documentation complete? Yes   Right Care Referral Info   Post Acute Recommendation No Care     Follow-up Information     Jacob Montanez MD. Schedule an appointment as soon as possible for a visit in 1 week.    Specialty:  Family Medicine  Contact information:  [address]  [facility] [address]  [place] [id number]

## 2017-11-25 NOTE — PLAN OF CARE
Problem: Diabetes, Type 2 (Adult)  Intervention: Support/Optimize Psychosocial Response to Condition   11/25/17 0209   Coping/Psychosocial Interventions   Supportive Measures active listening utilized;verbalization of feelings encouraged   Environmental Support environmental consistency promoted;personal routine supported;rest periods encouraged;calm environment promoted   Psychosocial Support   Family/Support System Care self-care encouraged     Intervention: Optimize Glycemic Control   11/25/17 0209   Nutrition Interventions   Glycemic Management blood glucose monitoring       Goal: Signs and Symptoms of Listed Potential Problems Will be Absent, Minimized or Managed (Diabetes, Type 2)  Signs and symptoms of listed potential problems will be absent, minimized or managed by discharge/transition of care (reference Diabetes, Type 2 (Adult) CPG).   Outcome: Ongoing (interventions implemented as appropriate)   11/25/17 0209   Diabetes, Type 2   Problems Assessed (Type 2 Diabetes) situational response   Problems Present (Type 2 Diabetes) situational response       Problem: Patient Care Overview  Goal: Plan of Care Review  Outcome: Ongoing (interventions implemented as appropriate)   11/25/17 0209   Coping/Psychosocial   Plan Of Care Reviewed With patient     Resting quietly in bed throughout shift.  Patient noted to occasionally present with combativeness with staff throughout this shift.  Not easily redirected.  Pain controlled by current plan of care. Purposeful hourly rounding in progress.  Remained free from falls and trauma throughout this shift.  Call bell within reach.  Will continue to monitor.

## 2017-11-25 NOTE — NURSING
Bedside report by Rebeca Rueda RN. In bed. Requesting pain med for back pain, 8/10 and insulin. NS @ 125ml to central line. Dressing reinforced. Bed locked in lowest position with communication board updated.

## 2017-11-27 LAB
ALBUMIN SERPL ELPH-MCNC: 3.05 G/DL
ALPHA1 GLOB SERPL ELPH-MCNC: 0.37 G/DL
ALPHA2 GLOB SERPL ELPH-MCNC: 0.7 G/DL
B-GLOBULIN SERPL ELPH-MCNC: 0.74 G/DL
BACTERIA BLD CULT: NORMAL
BACTERIA BLD CULT: NORMAL
GAMMA GLOB SERPL ELPH-MCNC: 1.44 G/DL
PATHOLOGIST INTERPRETATION SPE: NORMAL
PROT SERPL-MCNC: 6.3 G/DL

## 2017-12-12 NOTE — DISCHARGE SUMMARY
"[facility] Hospital Medicine  Discharge Summary      Patient Name: Domenic Mabry  MRN: [id number]  Admission Date: 11/22/2017  Hospital Length of Stay: 2 days  Discharge Date and Time: 11/25/2017  Attending Physician: Shawnee Nicholas   Discharging Provider: Shawnee Nicholas MD  Primary Care Provider: Jacob Montanez MD      HPI:   Mr. Domenic Mabry is a 52 y.o. male with type 2 diabetes mellitus (HbA1c 8.2% Jul 2016), hyperlipidemia (LDL 86.6 Apr 2016), intravenous drug abuse, anemia of chronic disease, chronic hepatitis C, chronic relapsing pancreatitis, and tobacco abuse who presents to [facility] ED with complaints of chest pain for three days.  The pain has actually been present "for a while" but acutely worsened in the last three days.  The pain is left-sided with radiation to his back, is sharp in quality, and is 10/10 in severity.  There are no alleviating or exacerbating factors, and the pain is not associated with any dyspnea, pleurisy, night sweats, weight loss, fevers, chills, hemoptysis, nor any lower extremity pain or swelling.  He denies any PND nor orthopnea.  He does admit to injecting heroin with his last time about a month ago.  He does that to relieve his back pain.  He has never had similar episodes in the past.    * No surgery found *      Hospital Course:   Mr. Domenic Mabry is a 52 y.o. male with type 2 diabetes mellitus (HbA1c 8.2% Jul 2016), hyperlipidemia (LDL 86.6 Apr 2016), intravenous drug abuse, anemia of chronic disease, chronic hepatitis C, chronic relapsing pancreatitis, and tobacco abuse who presents to [facility] ED with complaints of chest pain for three days.  The pain has actually been present "for a while" but acutely worsened in the last three days.  The pain is left-sided with radiation to his back, is sharp in quality, and is 10/10 in severity.  There are no alleviating or exacerbating factors, and the pain is not associated with any dyspnea, pleurisy, " night sweats, weight loss, fevers, chills, hemoptysis, nor any lower extremity pain or swelling.  He denies any PND nor orthopnea.  He does admit to injecting heroin with his last time about a month ago.  He does that to relieve his back pain.  He has never had similar episodes in the past.he was in shock,possiblt septic shock,and concern for endocarditis,he has kayla started on broad spectrum IV Abx,and iD has been consulted,no growth in cultures yet and TTE show no sign of vegetation.complain of severe back pain,ordered MRI to R/O discitis,shock is resolved,off levophed,and stable for transfer to floor.    Pt MRI did not show evidence of osteo, ok to dc home.  Pt long h/o drug abuse and many rehab stays. He thinks another try at rehab would be useless and will try sobriety on his own. He was given outpatient resources, all of which he has tried in past. Asked to follow up PCP as scheduled.      Consults:   Consults         Status Ordering Provider     Inpatient consult to Infectious Diseases  Once     Provider:  Bettie Lee MD    Completed SANDIP CAPPS          No new Assessment & Plan notes have been filed under this hospital service since the last note was generated.  Service: Hospital Medicine    Final Active Diagnoses:    Diagnosis Date Noted POA    Hyperlipidemia [E78.5] 11/23/2017 Yes     Chronic    Intravenous drug abuse [F19.10] 11/23/2017 Yes     Chronic    Anemia of chronic disease [D63.8] 11/23/2017 Yes     Chronic    Chronic hepatitis C [B18.2] 11/23/2017 Yes     Chronic    Chronic relapsing pancreatitis [K86.1] 11/23/2017 Yes     Chronic    Tobacco abuse [Z72.0] 11/23/2017 Yes     Chronic    Type 2 diabetes mellitus, uncontrolled [E11.65] 04/03/2016 Yes     Chronic      Problems Resolved During this Admission:    Diagnosis Date Noted Date Resolved POA    PRINCIPAL PROBLEM:  Septic shock [A41.9, R65.21] 11/23/2017 11/25/2017 Yes    SIRS (systemic inflammatory response syndrome) [R65.10]  11/24/2017 11/25/2017 Yes       Discharged Condition: fair    Disposition: Home or Self Care    Follow Up:  Follow-up Information     Jacob Montanez MD. Schedule an appointment as soon as possible for a visit in 1 week.    Specialty:  Family Medicine  Contact information:  [address] [facility] [address]  [place] [phone]                 Patient Instructions:     Diet Diabetic 2000 Calories     Activity as tolerated     Call MD for:  persistent nausea and vomiting or diarrhea     Call MD for:  persistent dizziness, light-headedness, or visual disturbances           Pending Diagnostic Studies:     None         Medications:  Reconciled Home Medications:   Discharge Medication List as of 11/25/2017  1:01 PM      START taking these medications    Details   ibuprofen (ADVIL,MOTRIN) 800 MG tablet Take 1 tablet (800 mg total) by mouth 3 (three) times daily., Starting Sat 11/25/2017, Until Mon 12/25/2017, Print      insulin aspart (NOVOLOG) 100 unit/mL InPn pen Inject 10 Units into the skin 3 (three) times daily., Starting Sat 11/25/2017, Until Sun 11/25/2018, No Print      nicotine (NICODERM CQ) 21 mg/24 hr Place 1 patch onto the skin once daily., Starting Sun 11/26/2017, OTC      omeprazole (PRILOSEC) 40 MG capsule Take 1 capsule (40 mg total) by mouth once daily., Starting Sat 11/25/2017, Until Sun 11/25/2018, Normal         CONTINUE these medications which have CHANGED    Details   gabapentin (NEURONTIN) 300 MG capsule Take 1 capsule (300 mg total) by mouth 2 (two) times daily., Starting Sat 11/25/2017, Until Mon 12/25/2017, Print      LANTUS 100 unit/mL injection Inject 15 Units into the skin 2 (two) times daily., Starting Sat 11/25/2017, Until Mon 12/25/2017, No Print         CONTINUE these medications which have NOT CHANGED    Details   atorvastatin (LIPITOR) 40 MG tablet Take 40 mg by mouth once daily., Until Discontinued, Historical Med         STOP taking these medications       HUMALOG 100 unit/mL injection  Comments:   Reason for Stopping:         lidocaine 4 % PtMd Comments:   Reason for Stopping:         nabumetone (RELAFEN) 750 MG tablet Comments:   Reason for Stopping:         NOVOLOG 100 unit/mL injection Comments:   Reason for Stopping:         sulindac (CLINORIL) 200 MG Tab Comments:   Reason for Stopping:         diazePAM (VALIUM) 5 MG tablet Comments:   Reason for Stopping:               Indwelling Lines/Drains at time of discharge:   Lines/Drains/Airways          No matching active lines, drains, or airways          Time spent on the discharge of patient: 35 minutes  Patient was seen and examined on the date of discharge and determined to be suitable for discharge.         Shawnee Nicholas MD  Department of Hospital Medicine  [facility]

## 2018-01-22 PROCEDURE — 26750 TREAT FINGER FRACTURE EACH: CPT

## 2018-01-22 PROCEDURE — 82962 GLUCOSE BLOOD TEST: CPT | Mod: 91

## 2018-01-22 PROCEDURE — 96372 THER/PROPH/DIAG INJ SC/IM: CPT

## 2018-01-22 PROCEDURE — 99284 EMERGENCY DEPT VISIT MOD MDM: CPT | Mod: 25

## 2018-01-22 PROCEDURE — 28490 TREAT BIG TOE FRACTURE: CPT

## 2018-01-23 ENCOUNTER — HOSPITAL ENCOUNTER (EMERGENCY)
Facility: HOSPITAL | Age: 53
Discharge: HOME | End: 2018-01-23
Attending: EMERGENCY MEDICINE
Payer: MEDICARE

## 2018-01-23 VITALS
BODY MASS INDEX: 25.2 KG/M2 | HEIGHT: 71 IN | HEART RATE: 95 BPM | TEMPERATURE: 98 F | SYSTOLIC BLOOD PRESSURE: 125 MMHG | RESPIRATION RATE: 18 BRPM | WEIGHT: 180 LBS | DIASTOLIC BLOOD PRESSURE: 74 MMHG | OXYGEN SATURATION: 97 %

## 2018-01-23 DIAGNOSIS — S92.424A CLOSED NONDISPLACED FRACTURE OF DISTAL PHALANX OF RIGHT GREAT TOE, INITIAL ENCOUNTER: ICD-10-CM

## 2018-01-23 DIAGNOSIS — M79.671 RIGHT FOOT PAIN: Primary | ICD-10-CM

## 2018-01-23 LAB
POCT GLUCOSE: 112 MG/DL (ref 70–110)
POCT GLUCOSE: 45 MG/DL (ref 70–110)

## 2018-01-23 PROCEDURE — 63600175 PHARM REV CODE 636 W HCPCS: Performed by: EMERGENCY MEDICINE

## 2018-01-23 RX ORDER — OXYCODONE AND ACETAMINOPHEN 5; 325 MG/1; MG/1
1 TABLET ORAL EVERY 6 HOURS PRN
Qty: 20 TABLET | Refills: 0 | Status: ON HOLD | OUTPATIENT
Start: 2018-01-23 | End: 2018-08-06 | Stop reason: HOSPADM

## 2018-01-23 RX ORDER — KETOROLAC TROMETHAMINE 30 MG/ML
30 INJECTION, SOLUTION INTRAMUSCULAR; INTRAVENOUS
Status: COMPLETED | OUTPATIENT
Start: 2018-01-23 | End: 2018-01-23

## 2018-01-23 RX ADMIN — KETOROLAC TROMETHAMINE 30 MG: 30 INJECTION, SOLUTION INTRAMUSCULAR at 05:01

## 2018-01-23 NOTE — ED NOTES
Pt says he administered self Novolog and Lantus at 2000 hours yesterday and didn't eat. Pt shaking, alert and oriented x 4. Pt given sandwich tray.

## 2018-01-23 NOTE — ED PROVIDER NOTES
Encounter Date: 1/22/2018    SCRIBE #1 NOTE: I, Von Soler, am scribing for, and in the presence of,  Darrell Pryor MD. I have scribed the following portions of the note - Other sections scribed: HPI, ROS.       History     Chief Complaint   Patient presents with    Foot Pain     Pt reports R foot pain after hitting toes on wall.       CC: Foot Pain    HPI: This 52 y.o. Male with PMHx HTN, DM, hepatitis, pancreatitis, and PSHx cardiac stent placement and R shoulder surgery presents to the ED via EMS c/o R foot pain secondary to hitting his foot against a dresser while walking with the lights off around 1042-1435 tonight. No prior tx reported.       The history is provided by the patient. No  was used.     Review of patient's allergies indicates:  No Known Allergies  Past Medical History:   Diagnosis Date    Diabetes mellitus     Hepatitis     HTN (hypertension)     Pancreatitis      Past Surgical History:   Procedure Laterality Date    CARDIAC SURGERY  1999    stent placed. ([facility])    SHOULDER SURGERY  2013    right shoulder, spur removal.     Family History   Problem Relation Age of Onset    No Known Problems Mother      Social History   Substance Use Topics    Smoking status: Current Every Day Smoker     Packs/day: 1.00     Years: 20.00     Types: Cigarettes     Start date: 4/3/1981    Smokeless tobacco: Never Used    Alcohol use No     Review of Systems   Constitutional: Negative for fever.   HENT: Negative for sore throat.    Respiratory: Negative for shortness of breath.    Cardiovascular: Negative for chest pain.   Gastrointestinal: Negative for nausea.   Genitourinary: Negative for dysuria.   Musculoskeletal: Negative for back pain.        (+) R foot pain   Skin: Negative for rash.   Neurological: Negative for weakness.   Hematological: Does not bruise/bleed easily.   All other systems reviewed and are negative.  All other systems reviewed and are  negative.        Physical Exam     Initial Vitals [01/22/18 2303]   BP Pulse Resp Temp SpO2   (!) 140/80 90 18 98.2 °F (36.8 °C) 98 %      MAP       100         Physical Exam    Nursing note and vitals reviewed.  Constitutional: He appears well-developed and well-nourished.   HENT:   Head: Normocephalic and atraumatic.   Eyes: Conjunctivae are normal. No scleral icterus.   Neck: Normal range of motion. Neck supple.   Musculoskeletal: Normal range of motion. He exhibits no edema or tenderness.        Right foot: Right great toe: Exhibits swelling and tenderness. There is tenderness of the MCP and IP joint.        Feet:    Neurological: He is alert and oriented to person, place, and time. He has normal strength.   Skin: Skin is warm and dry.   Psychiatric: He has a normal mood and affect. Thought content normal.         ED Course   Orthopedic Injury  Date/Time: 1/23/2018 3:15 AM  Performed by: RHONDA BAILON  Authorized by: RHONDA BAILON     Injury:     Injury location:  Toe    Location details:  Right great toe    Injury type:  Fracture (Intra-articular)    Fracture type: distal phalanx        Pre-procedure assessment:     Neurovascular status: Neurovascularly intact      Distal perfusion: normal      Neurological function: normal      Range of motion: reduced        Selections made in this section will also lock the Injury type section above.:     Manipulation performed?: No      Immobilization: Placed in post-op shoe and provided crutches.    Complications: No        Labs Reviewed   POCT GLUCOSE - Abnormal; Notable for the following:        Result Value    POCT Glucose 45 (*)     All other components within normal limits   POCT GLUCOSE - Abnormal; Notable for the following:     POCT Glucose 112 (*)     All other components within normal limits          X-Rays:   Independently Interpreted Readings:   Other Readings:  Xray right foot:  Minimally displaced intra-articular fracture involving the medial base  of the great toe distal phalanx.    Medical Decision Making:   History:   Old Medical Records: I decided to obtain old medical records.  Old Records Summarized: other records.  Initial Assessment:   52-year-old male with hypertension, diabetes, and CAD as well as a past history of hepatitis and pancreatitis presents the ED c/o acute traumatic right foot pain - see hpi for further details.      Differential Diagnosis:   Initial differential diagnosis includes but is not limited to... Contusion, sprain, fracture and/or dislocation.  Independently Interpreted Test(s):   I have ordered and independently interpreted X-rays - see prior notes.  Clinical Tests:   Lab Tests: Reviewed  Radiological Study: Ordered and Reviewed  ED Management:  Xrays reveal a minimally displaced intra-articular fracture involving the medial base of the great toe distal phalanx.  Patient placed in a postop shoe and provided crutches prior to being discharged home with instructions to follow-up with orthopedics asap to ensure proper healing.  Minor trauma precautions including return instructions discussed prior to discharge.              Scribe Attestation:   Scribe #1: I performed the above scribed service and the documentation accurately describes the services I performed. I attest to the accuracy of the note.    Attending Attestation:           Physician Attestation for Scribe:  Physician Attestation Statement for Scribe #1: I, Darrell Pryor MD, reviewed documentation, as scribed by Von Soler in my presence, and it is both accurate and complete.                 ED Course      Clinical Impression:   The primary encounter diagnosis was Right foot pain. A diagnosis of Closed nondisplaced fracture of distal phalanx of right great toe, initial encounter was also pertinent to this visit.    Disposition:   Disposition: Discharged                        Darrell Pryor MD  02/03/18 4975

## 2018-01-23 NOTE — ED TRIAGE NOTES
"Pt c/o right foot pain. Pt stated, "I was walking in the room and the lights was off. I hit my toes (right) against the dresser. Rates pain 8/10.   Past Medical History:   Diagnosis Date    Diabetes mellitus     Hepatitis     HTN (hypertension)     Pancreatitis      Past Surgical History:   Procedure Laterality Date    [facility]    SHOULDER SURGERY  2013    right shoulder, spur removal.     Vitals:    01/22/18 2303 01/23/18 0314   BP: (!) 140/80 122/75   BP Location: Right arm Right arm   Patient Position: Sitting Sitting   Pulse: 90 103   Resp: 18 18   Temp: 98.2 °F (36.8 °C) 98.3 °F (36.8 °C)   TempSrc: Oral Oral   SpO2: 98% 97%   Weight: 81.6 kg (180 lb)    Height: 5' 11" (1.803 m)      "

## 2018-02-01 ENCOUNTER — HOSPITAL ENCOUNTER (EMERGENCY)
Facility: HOSPITAL | Age: 53
Discharge: HOME | End: 2018-02-01
Attending: EMERGENCY MEDICINE
Payer: MEDICARE

## 2018-02-01 VITALS
WEIGHT: 140 LBS | DIASTOLIC BLOOD PRESSURE: 66 MMHG | SYSTOLIC BLOOD PRESSURE: 110 MMHG | OXYGEN SATURATION: 96 % | BODY MASS INDEX: 21.22 KG/M2 | HEIGHT: 68 IN | HEART RATE: 96 BPM | RESPIRATION RATE: 18 BRPM | TEMPERATURE: 98 F

## 2018-02-01 DIAGNOSIS — M79.671 RIGHT FOOT PAIN: ICD-10-CM

## 2018-02-01 DIAGNOSIS — S92.344A CLOSED NONDISPLACED FRACTURE OF FOURTH METATARSAL BONE OF RIGHT FOOT, INITIAL ENCOUNTER: Primary | ICD-10-CM

## 2018-02-01 PROCEDURE — 99284 EMERGENCY DEPT VISIT MOD MDM: CPT | Mod: 25

## 2018-02-01 PROCEDURE — 28470 CLTX METATARSAL FX WO MNP EA: CPT | Mod: RT

## 2018-02-01 PROCEDURE — 82962 GLUCOSE BLOOD TEST: CPT

## 2018-02-01 PROCEDURE — 25000003 PHARM REV CODE 250: Performed by: NURSE PRACTITIONER

## 2018-02-01 PROCEDURE — 63600175 PHARM REV CODE 636 W HCPCS: Performed by: NURSE PRACTITIONER

## 2018-02-01 PROCEDURE — 29515 APPLICATION SHORT LEG SPLINT: CPT

## 2018-02-01 PROCEDURE — 96372 THER/PROPH/DIAG INJ SC/IM: CPT

## 2018-02-01 RX ORDER — IBUPROFEN 600 MG/1
600 TABLET ORAL EVERY 6 HOURS PRN
Qty: 20 TABLET | Refills: 0 | Status: ON HOLD | OUTPATIENT
Start: 2018-02-01 | End: 2020-03-27

## 2018-02-01 RX ORDER — HYDROCODONE BITARTRATE AND ACETAMINOPHEN 5; 325 MG/1; MG/1
1 TABLET ORAL EVERY 6 HOURS PRN
Qty: 10 TABLET | Refills: 0 | Status: ON HOLD | OUTPATIENT
Start: 2018-02-01 | End: 2018-08-06 | Stop reason: HOSPADM

## 2018-02-01 RX ORDER — OXYCODONE AND ACETAMINOPHEN 5; 325 MG/1; MG/1
1 TABLET ORAL
Status: COMPLETED | OUTPATIENT
Start: 2018-02-01 | End: 2018-02-01

## 2018-02-01 RX ORDER — KETOROLAC TROMETHAMINE 30 MG/ML
30 INJECTION, SOLUTION INTRAMUSCULAR; INTRAVENOUS
Status: COMPLETED | OUTPATIENT
Start: 2018-02-01 | End: 2018-02-01

## 2018-02-01 RX ADMIN — OXYCODONE HYDROCHLORIDE AND ACETAMINOPHEN 1 TABLET: 5; 325 TABLET ORAL at 10:02

## 2018-02-01 RX ADMIN — KETOROLAC TROMETHAMINE 30 MG: 30 INJECTION, SOLUTION INTRAMUSCULAR at 10:02

## 2018-02-02 LAB — POCT GLUCOSE: 99 MG/DL (ref 70–110)

## 2018-02-02 NOTE — ED TRIAGE NOTES
Pt states he hurt right foot on dresser x 1.5 weeks ago. Foot and toes swollen. Pt states that he is diabetic. 7/10 pain scale

## 2018-02-02 NOTE — ED PROVIDER NOTES
Encounter Date: 2/1/2018    SCRIBE #1 NOTE: I, AnaPieroKaur Campbell, am scribing for, and in the presence of,  Mariah Harry NP. I have scribed the following portions of the note - Other sections scribed: HPI, ROS.       History     Chief Complaint   Patient presents with    Toe Pain     Pt reports being seen in the er 1wk ago, dx with broken 1st & 2nd toes on the right ft, still in severe pain & the bottom of his ft turning purple.     CC: Toe Pain    51 y/o male with DM, hepatitis, HTN, and pancreatitis presents to the ED c/o acute onset R sided hallux and 2nd digit pain that radiates up R foot due to accidentally kicking a dresser and fracturing both of his toes 10 days ago. The pain is severe (8/10) and has progressively worsened; palpation and ambulation exacerbate the pain. He thinks that he may have re injured his toes today. He states his foot began to hurt while he was playing with his nephews earlier today. The patient presented to this facility on 1/23/18 when he initially sustained his injuries where he was prescribed Percocet, which he's finished, and advised to consult with orthopedics. The patient is still trying to make an appt with orthopedics. The patient denies fever, chills, or cough. No other symptoms reported.      The history is provided by the patient. No  was used.     Review of patient's allergies indicates:  No Known Allergies  Past Medical History:   Diagnosis Date    Diabetes mellitus     Hepatitis     HTN (hypertension)     Pancreatitis      Past Surgical History:   Procedure Laterality Date    CARDIAC SURGERY  1999    stent placed. ([facility])    SHOULDER SURGERY  2013    right shoulder, spur removal.     Family History   Problem Relation Age of Onset    No Known Problems Mother      Social History   Substance Use Topics    Smoking status: Current Every Day Smoker     Packs/day: 1.00     Years: 20.00     Types: Cigarettes     Start date: 4/3/1981     Smokeless tobacco: Never Used    Alcohol use No     Review of Systems   Constitutional: Negative for chills and fever.   HENT: Negative for rhinorrhea.    Eyes: Negative for redness.   Respiratory: Negative for cough and shortness of breath.    Cardiovascular: Negative for chest pain.   Gastrointestinal: Negative for abdominal pain, diarrhea, nausea and vomiting.   Genitourinary: Negative for difficulty urinating and dysuria.   Musculoskeletal: Negative for back pain.        (+) R sided hallux and 2nd digit pain that radiates up R foot   Skin: Negative for rash.   Neurological: Negative for headaches.       Physical Exam     Initial Vitals [02/01/18 2123]   BP Pulse Resp Temp SpO2   117/64 97 16 98.7 °F (37.1 °C) 95 %      MAP       81.67         Physical Exam    Constitutional: Vital signs are normal. He appears well-developed and well-nourished.  Non-toxic appearance.   Eyes: EOM are normal.   Neck: Full passive range of motion without pain. Neck supple. No neck rigidity.   Cardiovascular: Normal rate, S1 normal, S2 normal and normal heart sounds. Exam reveals no gallop.    No murmur heard.  Pulmonary/Chest: Effort normal and breath sounds normal. No tachypnea. He has no decreased breath sounds. He has no wheezes. He has no rhonchi. He has no rales.   Musculoskeletal:        Right foot: There is tenderness, bony tenderness and swelling. There is normal range of motion and normal capillary refill.        Feet:    Neurological: He is alert and oriented to person, place, and time. GCS eye subscore is 4. GCS verbal subscore is 5. GCS motor subscore is 6.   Skin: Skin is warm, dry and intact. No rash noted.   Psychiatric: He has a normal mood and affect.         ED Course   Orthopedic Injury  Date/Time: 2/2/2018 2:17 AM  Performed by: DARIAN SIGALA  Authorized by: RHONDA BAILON     Injury:     Injury location:  Foot    Location details:  Right foot    Injury type:  Fracture    Fracture type: fourth  metatarsal        Pre-procedure assessment:     Neurovascular status: Neurovascularly intact      Distal perfusion: normal      Neurological function: normal      Range of motion: normal      Local anesthesia used?: No        Selections made in this section will also lock the Injury type section above.:     Manipulation performed?: No      Immobilization:  Splint    Splint type:  Short leg    Supplies used:  Ortho-Glass    Complications: No    Post-procedure assessment:     Neurovascular status: Neurovascularly intact      Distal perfusion: normal      Neurological function: normal      Range of motion: unchanged      Patient tolerance:  Patient tolerated the procedure well with no immediate complications      Labs Reviewed   POCT GLUCOSE             Medical Decision Making:   ED Management:  This is a 52-year-old male who presents to the ED with complaints of worsening pain to the right foot.  He was evaluated in this ED 10 days ago and diagnosed with a fracture to the great toe phalanx.  A she believes he may have reinjured himself today.  He is afebrile and nontoxic-appearing.  On exam, there is mild swelling and tenderness to the right foot.  Distal pulses intact, compartments soft.  X-ray shows a fracture of the distal neck of the fourth metatarsal.  Short leg splint placed.  No evidence to suggest compartment syndrome.  As this patient has a new fracture, I will prescribe a short course of pain medication however, I informed the patient that we do not refill these in the emergency department.  Advised follow-up with orthopedics.  Discharged home with instructions for supportive care and follow-up.  Return precautions given.            Scribe Attestation:   Scribe #1: I performed the above scribed service and the documentation accurately describes the services I performed. I attest to the accuracy of the note.    Attending Attestation:           Physician Attestation for Scribe:  Physician Attestation Statement  for Scribe #1: I, Mariah Lim - NP, reviewed documentation, as scribed by Marco A Campbell in my presence, and it is both accurate and complete.                 ED Course      Clinical Impression:   The primary encounter diagnosis was Closed nondisplaced fracture of fourth metatarsal bone of right foot, initial encounter. A diagnosis of Right foot pain was also pertinent to this visit.    Disposition:   Disposition: Discharged  Condition: Stable                        Mariah Lim NP  02/02/18 0223

## 2018-02-02 NOTE — DISCHARGE INSTRUCTIONS
Please return to the ED for any new or worsening symptoms: worsening pain or swelling, loss of consciousness or any other concerns. Please follow up with orthopedics within in the week. You may also call [phone] for the [facility] same day appointment line.

## 2018-02-02 NOTE — ED NOTES
Pt made aware of [facility] narcotic policy. Pt states that [facility] is picking him up. Informed pt that his ride must come in before he is able to be d/c. Pt verbalized understanding.

## 2018-08-04 ENCOUNTER — HOSPITAL ENCOUNTER (OUTPATIENT)
Facility: HOSPITAL | Age: 53
Discharge: HOME | DRG: 864 | End: 2018-08-06
Attending: EMERGENCY MEDICINE | Admitting: EMERGENCY MEDICINE
Payer: MEDICARE

## 2018-08-04 DIAGNOSIS — F19.90 IVDU (INTRAVENOUS DRUG USER): ICD-10-CM

## 2018-08-04 DIAGNOSIS — A41.9 SEPSIS: ICD-10-CM

## 2018-08-04 DIAGNOSIS — R50.9 FEVER, UNKNOWN ORIGIN: Primary | ICD-10-CM

## 2018-08-04 LAB
ALBUMIN SERPL BCP-MCNC: 3.6 G/DL
ALP SERPL-CCNC: 82 U/L
ALT SERPL W/O P-5'-P-CCNC: 18 U/L
AMPHET+METHAMPHET UR QL: NEGATIVE
ANION GAP SERPL CALC-SCNC: 8 MMOL/L
APTT BLDCRRT: 29.3 SEC
AST SERPL-CCNC: 18 U/L
BARBITURATES UR QL SCN>200 NG/ML: NEGATIVE
BASOPHILS # BLD AUTO: 0.01 K/UL
BASOPHILS NFR BLD: 0.2 %
BENZODIAZ UR QL SCN>200 NG/ML: NEGATIVE
BILIRUB SERPL-MCNC: 0.4 MG/DL
BILIRUB UR QL STRIP: NEGATIVE
BNP SERPL-MCNC: 124 PG/ML
BUN SERPL-MCNC: 13 MG/DL
BZE UR QL SCN: NORMAL
CALCIUM SERPL-MCNC: 9.6 MG/DL
CANNABINOIDS UR QL SCN: NEGATIVE
CHLORIDE SERPL-SCNC: 101 MMOL/L
CLARITY UR: CLEAR
CO2 SERPL-SCNC: 28 MMOL/L
COLOR UR: YELLOW
CREAT SERPL-MCNC: 0.9 MG/DL
CREAT UR-MCNC: 87 MG/DL
DIFFERENTIAL METHOD: ABNORMAL
EOSINOPHIL # BLD AUTO: 0.1 K/UL
EOSINOPHIL NFR BLD: 1.8 %
ERYTHROCYTE [DISTWIDTH] IN BLOOD BY AUTOMATED COUNT: 15.5 %
EST. GFR  (AFRICAN AMERICAN): >60 ML/MIN/1.73 M^2
EST. GFR  (NON AFRICAN AMERICAN): >60 ML/MIN/1.73 M^2
ETHANOL SERPL-MCNC: <10 MG/DL
GLUCOSE SERPL-MCNC: 71 MG/DL
GLUCOSE UR QL STRIP: NEGATIVE
HCT VFR BLD AUTO: 35.9 %
HGB BLD-MCNC: 11.8 G/DL
HGB UR QL STRIP: NEGATIVE
INR PPP: 1
KETONES UR QL STRIP: NEGATIVE
LACTATE SERPL-SCNC: 0.8 MMOL/L
LACTATE SERPL-SCNC: 1 MMOL/L
LEUKOCYTE ESTERASE UR QL STRIP: NEGATIVE
LIPASE SERPL-CCNC: 14 U/L
LYMPHOCYTES # BLD AUTO: 0.6 K/UL
LYMPHOCYTES NFR BLD: 9.9 %
MAGNESIUM SERPL-MCNC: 2.3 MG/DL
MCH RBC QN AUTO: 26.6 PG
MCHC RBC AUTO-ENTMCNC: 32.9 G/DL
MCV RBC AUTO: 81 FL
METHADONE UR QL SCN>300 NG/ML: NEGATIVE
MONOCYTES # BLD AUTO: 0.4 K/UL
MONOCYTES NFR BLD: 7.1 %
NEUTROPHILS # BLD AUTO: 5 K/UL
NEUTROPHILS NFR BLD: 80.7 %
NITRITE UR QL STRIP: NEGATIVE
OPIATES UR QL SCN: NORMAL
PCP UR QL SCN>25 NG/ML: NEGATIVE
PH UR STRIP: 5 [PH] (ref 5–8)
PHOSPHATE SERPL-MCNC: 2.7 MG/DL
PLATELET # BLD AUTO: 258 K/UL
PMV BLD AUTO: 9.7 FL
POCT GLUCOSE: 76 MG/DL (ref 70–110)
POTASSIUM SERPL-SCNC: 4.2 MMOL/L
PROT SERPL-MCNC: 8.1 G/DL
PROT UR QL STRIP: NEGATIVE
PROTHROMBIN TIME: 10.4 SEC
RBC # BLD AUTO: 4.44 M/UL
SODIUM SERPL-SCNC: 137 MMOL/L
SP GR UR STRIP: 1.02 (ref 1–1.03)
TOXICOLOGY INFORMATION: NORMAL
TROPONIN I SERPL DL<=0.01 NG/ML-MCNC: <0.006 NG/ML
URN SPEC COLLECT METH UR: NORMAL
UROBILINOGEN UR STRIP-ACNC: NEGATIVE EU/DL
WBC # BLD AUTO: 6.16 K/UL

## 2018-08-04 PROCEDURE — 83880 ASSAY OF NATRIURETIC PEPTIDE: CPT

## 2018-08-04 PROCEDURE — 85610 PROTHROMBIN TIME: CPT

## 2018-08-04 PROCEDURE — 83605 ASSAY OF LACTIC ACID: CPT | Mod: 91

## 2018-08-04 PROCEDURE — G0378 HOSPITAL OBSERVATION PER HR: HCPCS

## 2018-08-04 PROCEDURE — 25000003 PHARM REV CODE 250: Performed by: EMERGENCY MEDICINE

## 2018-08-04 PROCEDURE — 87040 BLOOD CULTURE FOR BACTERIA: CPT | Mod: 59

## 2018-08-04 PROCEDURE — 85730 THROMBOPLASTIN TIME PARTIAL: CPT

## 2018-08-04 PROCEDURE — 87086 URINE CULTURE/COLONY COUNT: CPT

## 2018-08-04 PROCEDURE — 83735 ASSAY OF MAGNESIUM: CPT

## 2018-08-04 PROCEDURE — 96375 TX/PRO/DX INJ NEW DRUG ADDON: CPT

## 2018-08-04 PROCEDURE — 84145 PROCALCITONIN (PCT): CPT

## 2018-08-04 PROCEDURE — 63600175 PHARM REV CODE 636 W HCPCS: Performed by: EMERGENCY MEDICINE

## 2018-08-04 PROCEDURE — 84484 ASSAY OF TROPONIN QUANT: CPT

## 2018-08-04 PROCEDURE — 93005 ELECTROCARDIOGRAM TRACING: CPT

## 2018-08-04 PROCEDURE — 96374 THER/PROPH/DIAG INJ IV PUSH: CPT

## 2018-08-04 PROCEDURE — 83690 ASSAY OF LIPASE: CPT

## 2018-08-04 PROCEDURE — 96361 HYDRATE IV INFUSION ADD-ON: CPT

## 2018-08-04 PROCEDURE — 82962 GLUCOSE BLOOD TEST: CPT

## 2018-08-04 PROCEDURE — 81003 URINALYSIS AUTO W/O SCOPE: CPT

## 2018-08-04 PROCEDURE — 85025 COMPLETE CBC W/AUTO DIFF WBC: CPT

## 2018-08-04 PROCEDURE — 84100 ASSAY OF PHOSPHORUS: CPT

## 2018-08-04 PROCEDURE — 99285 EMERGENCY DEPT VISIT HI MDM: CPT | Mod: 25

## 2018-08-04 PROCEDURE — 11000001 HC ACUTE MED/SURG PRIVATE ROOM

## 2018-08-04 PROCEDURE — 93010 ELECTROCARDIOGRAM REPORT: CPT | Mod: ,,, | Performed by: INTERNAL MEDICINE

## 2018-08-04 PROCEDURE — 80307 DRUG TEST PRSMV CHEM ANLYZR: CPT

## 2018-08-04 PROCEDURE — 80320 DRUG SCREEN QUANTALCOHOLS: CPT

## 2018-08-04 PROCEDURE — 80053 COMPREHEN METABOLIC PANEL: CPT

## 2018-08-04 RX ORDER — NALOXONE HCL 0.4 MG/ML
0.4 VIAL (ML) INJECTION
Status: COMPLETED | OUTPATIENT
Start: 2018-08-04 | End: 2018-08-04

## 2018-08-04 RX ORDER — ONDANSETRON 2 MG/ML
4 INJECTION INTRAMUSCULAR; INTRAVENOUS EVERY 8 HOURS PRN
Status: DISCONTINUED | OUTPATIENT
Start: 2018-08-04 | End: 2018-08-05

## 2018-08-04 RX ORDER — SODIUM CHLORIDE 9 MG/ML
INJECTION, SOLUTION INTRAVENOUS CONTINUOUS
Status: DISCONTINUED | OUTPATIENT
Start: 2018-08-04 | End: 2018-08-06 | Stop reason: HOSPADM

## 2018-08-04 RX ORDER — ACETAMINOPHEN 325 MG/1
650 TABLET ORAL EVERY 8 HOURS PRN
Status: DISCONTINUED | OUTPATIENT
Start: 2018-08-04 | End: 2018-08-06 | Stop reason: HOSPADM

## 2018-08-04 RX ORDER — IBUPROFEN 600 MG/1
600 TABLET ORAL
Status: COMPLETED | OUTPATIENT
Start: 2018-08-04 | End: 2018-08-04

## 2018-08-04 RX ORDER — ACETAMINOPHEN 500 MG
1000 TABLET ORAL
Status: COMPLETED | OUTPATIENT
Start: 2018-08-04 | End: 2018-08-04

## 2018-08-04 RX ORDER — VANCOMYCIN HCL IN 5 % DEXTROSE 1G/250ML
15 PLASTIC BAG, INJECTION (ML) INTRAVENOUS
Status: COMPLETED | OUTPATIENT
Start: 2018-08-04 | End: 2018-08-04

## 2018-08-04 RX ADMIN — ACETAMINOPHEN 1000 MG: 500 TABLET, FILM COATED ORAL at 04:08

## 2018-08-04 RX ADMIN — VANCOMYCIN HYDROCHLORIDE 1000 MG: 1 INJECTION, POWDER, LYOPHILIZED, FOR SOLUTION INTRAVENOUS at 06:08

## 2018-08-04 RX ADMIN — SODIUM CHLORIDE 408 ML: 9 INJECTION, SOLUTION INTRAVENOUS at 03:08

## 2018-08-04 RX ADMIN — PIPERACILLIN AND TAZOBACTAM 4.5 G: 4; .5 INJECTION, POWDER, LYOPHILIZED, FOR SOLUTION INTRAVENOUS; PARENTERAL at 05:08

## 2018-08-04 RX ADMIN — SODIUM CHLORIDE: 0.9 INJECTION, SOLUTION INTRAVENOUS at 08:08

## 2018-08-04 RX ADMIN — NALOXONE HYDROCHLORIDE 0.4 MG: 0.4 INJECTION, SOLUTION INTRAMUSCULAR; INTRAVENOUS; SUBCUTANEOUS at 04:08

## 2018-08-04 RX ADMIN — IBUPROFEN 600 MG: 600 TABLET ORAL at 03:08

## 2018-08-04 RX ADMIN — ACETAMINOPHEN 650 MG: 325 TABLET, FILM COATED ORAL at 08:08

## 2018-08-04 NOTE — ED NOTES
"CT came to give pt oral contrast.  Pt refused to drink contrast.  Stating "I have back pains, I don't know why they are doing this test."  I called Dr. Alberto, he said Dr. Sanchez did not discuss this test with him and agreed test could wait until tomorrow.  Order changed.   Claritza on floor notified.  "

## 2018-08-04 NOTE — ED NOTES
Pt appears more awake and alert. Pt able to answer questions and follow commands. Pt reports taking 5 units of insulin this morning.

## 2018-08-04 NOTE — ED TRIAGE NOTES
Pt to the ED via EMS with c/o altered mental status. Upon EMS arrival pt glucose 89 mg/dL, temp 102.0. EMS reports pt arguing with mother. Pt reports heroin and weed use today. Track marks noted to R arm. Pt answers questions, however speech is slurred with delayed responses and multiple attempts to ask question. Pt placed on cardiac monitor, continuous pulse ox and BP cuff. CBG 76 mg/dL. No acute distress noted. Pt is poor historian due to delay.

## 2018-08-04 NOTE — ED PROVIDER NOTES
Encounter Date: 8/4/2018       History     Chief Complaint   Patient presents with    Ingestion     Pt admits to heroin and weed use today.    + nausea and vomitiitn noted.     Patient presents complaining of not feeling well.  Started today.  Admits to using IV heroin and smoking marijuana today.  States he had 1 episode of emesis earlier today.  States that chest pain and shortness of breath.  Specifically denies headache, sore throat or sinus infection, cough, abdominal pain, diarrhea, dysuria, skin rashes.          Review of patient's allergies indicates:  No Known Allergies  Past Medical History:   Diagnosis Date    Diabetes mellitus     Hepatitis     HTN (hypertension)     Pancreatitis      Past Surgical History:   Procedure Laterality Date    CARDIAC SURGERY  1999    stent [facility]. ([facility]    SHOULDER SURGERY  2013    right shoulder, spur removal.     Family History   Problem Relation Age of Onset    No Known Problems Mother      Social History   Substance Use Topics    Smoking status: Current Every Day Smoker     Packs/day: 1.00     Years: 20.00     Types: Cigarettes     Start date: 4/3/1981    Smokeless tobacco: Never Used    Alcohol use No     Review of Systems   Constitutional: Positive for chills and fever.   HENT: Negative for dental problem, rhinorrhea, sinus pain, sinus pressure, sore throat, trouble swallowing and voice change.    Eyes: Positive for photophobia. Negative for redness and visual disturbance.   Respiratory: Positive for shortness of breath. Negative for cough, chest tightness, wheezing and stridor.    Cardiovascular: Positive for chest pain. Negative for palpitations and leg swelling.   Gastrointestinal: Positive for nausea and vomiting. Negative for abdominal distention, abdominal pain and diarrhea.   Endocrine: Negative for polydipsia, polyphagia and polyuria.   Genitourinary: Negative for dysuria, hematuria and testicular pain.   Musculoskeletal: Negative for  back pain, joint swelling, neck pain and neck stiffness.   Skin: Negative for rash and wound.   Neurological: Positive for weakness. Negative for dizziness, light-headedness, numbness and headaches.   Hematological: Does not bruise/bleed easily.       Physical Exam     Initial Vitals [08/04/18 1419]   BP Pulse Resp Temp SpO2   136/67 92 18 (!) 100.8 °F (38.2 °C) 95 %      MAP       --         Physical Exam    Nursing note and vitals reviewed.  Constitutional: He appears well-developed and well-nourished.   HENT:   Head: Atraumatic.   Eyes: EOM are normal. Pupils are equal, round, and reactive to light.   Neck: Normal range of motion. Neck supple. No JVD present.   Cardiovascular: Normal rate, regular rhythm, normal heart sounds and intact distal pulses. Exam reveals no gallop and no friction rub.    No murmur heard.  Pulmonary/Chest: Breath sounds normal. No respiratory distress. He has no wheezes. He has no rhonchi. He has no rales. He exhibits no tenderness.   Abdominal: Soft. Bowel sounds are normal. He exhibits no distension and no mass. There is no tenderness. There is no rebound and no guarding.   Musculoskeletal: Normal range of motion.   Lymphadenopathy:     He has no cervical adenopathy.   Neurological: He is alert and oriented to person, place, and time. He has normal strength and normal reflexes. He displays normal reflexes. No cranial nerve deficit.   Patient states neuropathy that is chronic to bilateral feet. Patient arouses to verbal stimulation.  Then drifts back to sleep.   Skin: Skin is warm and dry. Capillary refill takes less than 2 seconds. No rash noted.         ED Course   Procedures  Labs Reviewed   CBC W/ AUTO DIFFERENTIAL - Abnormal; Notable for the following:        Result Value    RBC 4.44 (*)     Hemoglobin 11.8 (*)     Hematocrit 35.9 (*)     MCV 81 (*)     MCH 26.6 (*)     RDW 15.5 (*)     Lymph # 0.6 (*)     Gran% 80.7 (*)     Lymph% 9.9 (*)     All other components within normal  limits   B-TYPE NATRIURETIC PEPTIDE - Abnormal; Notable for the following:      (*)     All other components within normal limits   CULTURE, BLOOD   CULTURE, BLOOD   CULTURE, URINE   CULTURE, BLOOD   CULTURE, BLOOD   COMPREHENSIVE METABOLIC PANEL   LACTIC ACID, PLASMA   URINALYSIS   MAGNESIUM   PHOSPHORUS   APTT   PROTIME-INR   LIPASE   TROPONIN I   DRUG SCREEN PANEL, URINE EMERGENCY   ALCOHOL,MEDICAL (ETHANOL)   PROCALCITONIN   LACTIC ACID, PLASMA   POCT GLUCOSE          Imaging Results          X-Ray Chest AP Portable (Final result)  Result time 08/04/18 15:11:08    Final result by Jered Cheatham MD (08/04/18 15:11:08)                 Impression:      No evidence of acute cardiopulmonary disease.    1 cm nodular opacity over the right lower lung zone, possibly a nipple shadow.  Recommend repeat radiographs with nipple markers if/when clinically appropriate.      Electronically signed by: Jered Cheatham MD  Date:    08/04/2018  Time:    15:11             Narrative:    EXAMINATION:  XR CHEST AP PORTABLE    CLINICAL HISTORY:  Sepsis;    TECHNIQUE:  Single frontal view of the chest was performed.    COMPARISON:  11/23/2017    FINDINGS:  Multiple overlying cardiac monitoring leads.  Cardiomediastinal silhouette is normal in size.  Pulmonary vascularity within normal limits.    1 cm nodular opacity projects over the right lower lung zone (overlapping the 6th rib anteriorly).  No confluent opacity.  No significant volume of pleural fluid or pneumothorax.                                   the patient has positive cocaine and heroin.  States he used heroin.  Patient has no evidence of cocaine toxidrome other than fever.  However this could be being masked.  No dilated pupils.  No tachycardia.  Patient overall mildly lethargic.  This reversed with Narcan.  Patient had no complaints of abdominal pain and no complaints of abdominal tenderness until after the Narcan and then he complained of abdominal pain.  This is  likely opiate withdrawal.  Repeat abdominal exam still shows no abdominal tenderness.  However origin of fever still unknown.  Will obtain CT abdomen pelvis as further evaluation of the patient's complaint abdominal pain. Shortly after complaining of abdominal pain he also complained he was hungry and wanted something the eat.  Patient's right anterior cubital fossa shows erythema near recently injected site for IV heroin.  There is no evidence of induration or abscess at this time.  Patient denies any knowledge of broken needle or concern for foreign body.  No elbow pain.  Patient was undressed and there is no evidence of abscess or cellulitis other than the erythema to the right AC.  Patient does complain of chest pain and shortness of breath. EKG shows no ischemia.  Chest x-ray shows no acute abnormalities.  I do not appreciate a murmur.  I do not see any Janeway lesions, Osler's nodes, splinter hemorrhages in the nail beds.  I am unable to get an accurate funduscopic exam on this patient and therefore Gallo spots could not be ruled out.  He despite oral Motrin patient's temperature went up in the emergency room.  Patient's temperature up to 101.2° F. patient has had no nausea or vomiting while in the emergency department.  No clinical evidence of meningitis.  The 4 sets of blood cultures drawn and patient given Zosyn and vancomycin for potential bacteremia and endocarditis due to patient's complaints. Will a admit for IV antibiotics and pending cultures.  Spoke with Dr. Henderson who accepted the patient.                     Clinical Impression:   The primary encounter diagnosis was Fever, unknown origin. Diagnoses of Sepsis and IVDU (intravenous drug user) were also pertinent to this visit.                             Mat Sanchez MD  08/04/18 [id number]

## 2018-08-05 PROBLEM — F11.10 HEROIN ABUSE: Status: ACTIVE | Noted: 2018-08-05

## 2018-08-05 PROBLEM — F12.10 MARIJUANA ABUSE: Status: ACTIVE | Noted: 2018-08-05

## 2018-08-05 PROBLEM — F14.10 COCAINE ABUSE: Status: ACTIVE | Noted: 2018-08-05

## 2018-08-05 LAB
ANION GAP SERPL CALC-SCNC: 9 MMOL/L
BASOPHILS # BLD AUTO: 0 K/UL
BASOPHILS NFR BLD: 0 %
BUN SERPL-MCNC: 13 MG/DL
CALCIUM SERPL-MCNC: 8.8 MG/DL
CHLORIDE SERPL-SCNC: 100 MMOL/L
CO2 SERPL-SCNC: 25 MMOL/L
CREAT SERPL-MCNC: 1 MG/DL
CRP SERPL-MCNC: 74.3 MG/L
DIFFERENTIAL METHOD: ABNORMAL
EOSINOPHIL # BLD AUTO: 0.2 K/UL
EOSINOPHIL NFR BLD: 3.3 %
ERYTHROCYTE [DISTWIDTH] IN BLOOD BY AUTOMATED COUNT: 15.4 %
ERYTHROCYTE [SEDIMENTATION RATE] IN BLOOD BY WESTERGREN METHOD: 45 MM/HR
EST. GFR  (AFRICAN AMERICAN): >60 ML/MIN/1.73 M^2
EST. GFR  (NON AFRICAN AMERICAN): >60 ML/MIN/1.73 M^2
GLUCOSE SERPL-MCNC: 309 MG/DL
HCT VFR BLD AUTO: 37.3 %
HGB BLD-MCNC: 12.2 G/DL
HIV1+2 IGG SERPL QL IA.RAPID: NEGATIVE
LYMPHOCYTES # BLD AUTO: 0.9 K/UL
LYMPHOCYTES NFR BLD: 20.7 %
MCH RBC QN AUTO: 26.3 PG
MCHC RBC AUTO-ENTMCNC: 32.7 G/DL
MCV RBC AUTO: 80 FL
MONOCYTES # BLD AUTO: 0.4 K/UL
MONOCYTES NFR BLD: 9.5 %
NEUTROPHILS # BLD AUTO: 3 K/UL
NEUTROPHILS NFR BLD: 66.3 %
PLATELET # BLD AUTO: 243 K/UL
PMV BLD AUTO: 9.6 FL
POCT GLUCOSE: 250 MG/DL (ref 70–110)
POCT GLUCOSE: 303 MG/DL (ref 70–110)
POCT GLUCOSE: 312 MG/DL (ref 70–110)
POCT GLUCOSE: 390 MG/DL (ref 70–110)
POCT GLUCOSE: 403 MG/DL (ref 70–110)
POTASSIUM SERPL-SCNC: 4.6 MMOL/L
PROCALCITONIN SERPL IA-MCNC: 0.3 NG/ML
RBC # BLD AUTO: 4.64 M/UL
SODIUM SERPL-SCNC: 134 MMOL/L
WBC # BLD AUTO: 4.55 K/UL

## 2018-08-05 PROCEDURE — 80048 BASIC METABOLIC PNL TOTAL CA: CPT

## 2018-08-05 PROCEDURE — 86703 HIV-1/HIV-2 1 RESULT ANTBDY: CPT

## 2018-08-05 PROCEDURE — 85652 RBC SED RATE AUTOMATED: CPT

## 2018-08-05 PROCEDURE — 63600175 PHARM REV CODE 636 W HCPCS: Performed by: HOSPITALIST

## 2018-08-05 PROCEDURE — 86592 SYPHILIS TEST NON-TREP QUAL: CPT

## 2018-08-05 PROCEDURE — G0378 HOSPITAL OBSERVATION PER HR: HCPCS

## 2018-08-05 PROCEDURE — 25500020 PHARM REV CODE 255: Performed by: HOSPITALIST

## 2018-08-05 PROCEDURE — 25000003 PHARM REV CODE 250: Performed by: HOSPITALIST

## 2018-08-05 PROCEDURE — C9113 INJ PANTOPRAZOLE SODIUM, VIA: HCPCS | Performed by: EMERGENCY MEDICINE

## 2018-08-05 PROCEDURE — 11000001 HC ACUTE MED/SURG PRIVATE ROOM

## 2018-08-05 PROCEDURE — 83036 HEMOGLOBIN GLYCOSYLATED A1C: CPT

## 2018-08-05 PROCEDURE — 63600175 PHARM REV CODE 636 W HCPCS: Performed by: EMERGENCY MEDICINE

## 2018-08-05 PROCEDURE — 86140 C-REACTIVE PROTEIN: CPT

## 2018-08-05 PROCEDURE — 25000003 PHARM REV CODE 250: Performed by: EMERGENCY MEDICINE

## 2018-08-05 PROCEDURE — 94761 N-INVAS EAR/PLS OXIMETRY MLT: CPT

## 2018-08-05 PROCEDURE — 36415 COLL VENOUS BLD VENIPUNCTURE: CPT

## 2018-08-05 PROCEDURE — S4991 NICOTINE PATCH NONLEGEND: HCPCS | Performed by: HOSPITALIST

## 2018-08-05 PROCEDURE — 85025 COMPLETE CBC W/AUTO DIFF WBC: CPT

## 2018-08-05 RX ORDER — INSULIN ASPART 100 [IU]/ML
1-10 INJECTION, SOLUTION INTRAVENOUS; SUBCUTANEOUS
Status: DISCONTINUED | OUTPATIENT
Start: 2018-08-06 | End: 2018-08-06 | Stop reason: HOSPADM

## 2018-08-05 RX ORDER — IBUPROFEN 200 MG
1 TABLET ORAL DAILY
Status: DISCONTINUED | OUTPATIENT
Start: 2018-08-05 | End: 2018-08-06 | Stop reason: HOSPADM

## 2018-08-05 RX ORDER — OXYCODONE AND ACETAMINOPHEN 5; 325 MG/1; MG/1
1 TABLET ORAL EVERY 6 HOURS PRN
Status: DISCONTINUED | OUTPATIENT
Start: 2018-08-05 | End: 2018-08-06 | Stop reason: HOSPADM

## 2018-08-05 RX ORDER — GLUCAGON 1 MG
1 KIT INJECTION
Status: DISCONTINUED | OUTPATIENT
Start: 2018-08-06 | End: 2018-08-06 | Stop reason: HOSPADM

## 2018-08-05 RX ORDER — ATORVASTATIN CALCIUM 40 MG/1
40 TABLET, FILM COATED ORAL DAILY
Status: DISCONTINUED | OUTPATIENT
Start: 2018-08-05 | End: 2018-08-06 | Stop reason: HOSPADM

## 2018-08-05 RX ORDER — IBUPROFEN 200 MG
16 TABLET ORAL
Status: DISCONTINUED | OUTPATIENT
Start: 2018-08-06 | End: 2018-08-06 | Stop reason: HOSPADM

## 2018-08-05 RX ORDER — ONDANSETRON 2 MG/ML
8 INJECTION INTRAMUSCULAR; INTRAVENOUS EVERY 8 HOURS PRN
Status: DISCONTINUED | OUTPATIENT
Start: 2018-08-05 | End: 2018-08-06 | Stop reason: HOSPADM

## 2018-08-05 RX ORDER — GLUCAGON 1 MG
1 KIT INJECTION
Status: DISCONTINUED | OUTPATIENT
Start: 2018-08-05 | End: 2018-08-05

## 2018-08-05 RX ORDER — IBUPROFEN 200 MG
24 TABLET ORAL
Status: DISCONTINUED | OUTPATIENT
Start: 2018-08-06 | End: 2018-08-06 | Stop reason: HOSPADM

## 2018-08-05 RX ORDER — INSULIN ASPART 100 [IU]/ML
1-10 INJECTION, SOLUTION INTRAVENOUS; SUBCUTANEOUS EVERY 6 HOURS PRN
Status: DISCONTINUED | OUTPATIENT
Start: 2018-08-05 | End: 2018-08-05

## 2018-08-05 RX ORDER — RAMELTEON 8 MG/1
8 TABLET ORAL NIGHTLY PRN
Status: DISCONTINUED | OUTPATIENT
Start: 2018-08-05 | End: 2018-08-06 | Stop reason: HOSPADM

## 2018-08-05 RX ORDER — CYCLOBENZAPRINE HCL 5 MG
5 TABLET ORAL 3 TIMES DAILY PRN
Status: DISCONTINUED | OUTPATIENT
Start: 2018-08-05 | End: 2018-08-06 | Stop reason: HOSPADM

## 2018-08-05 RX ADMIN — ATORVASTATIN CALCIUM 40 MG: 40 TABLET, FILM COATED ORAL at 08:08

## 2018-08-05 RX ADMIN — PIPERACILLIN AND TAZOBACTAM 4.5 G: 4; .5 INJECTION, POWDER, LYOPHILIZED, FOR SOLUTION INTRAVENOUS; PARENTERAL at 09:08

## 2018-08-05 RX ADMIN — CYCLOBENZAPRINE HYDROCHLORIDE 5 MG: 5 TABLET, FILM COATED ORAL at 08:08

## 2018-08-05 RX ADMIN — OXYCODONE HYDROCHLORIDE AND ACETAMINOPHEN 1 TABLET: 5; 325 TABLET ORAL at 04:08

## 2018-08-05 RX ADMIN — INSULIN ASPART 8 UNITS: 100 INJECTION, SOLUTION INTRAVENOUS; SUBCUTANEOUS at 08:08

## 2018-08-05 RX ADMIN — INSULIN ASPART 10 UNITS: 100 INJECTION, SOLUTION INTRAVENOUS; SUBCUTANEOUS at 04:08

## 2018-08-05 RX ADMIN — PIPERACILLIN AND TAZOBACTAM 4.5 G: 4; .5 INJECTION, POWDER, LYOPHILIZED, FOR SOLUTION INTRAVENOUS; PARENTERAL at 01:08

## 2018-08-05 RX ADMIN — NICOTINE 1 PATCH: 21 PATCH, EXTENDED RELEASE TRANSDERMAL at 08:08

## 2018-08-05 RX ADMIN — VANCOMYCIN HYDROCHLORIDE 750 MG: 750 INJECTION, POWDER, LYOPHILIZED, FOR SOLUTION INTRAVENOUS at 05:08

## 2018-08-05 RX ADMIN — DEXTROSE 40 MG: 50 INJECTION, SOLUTION INTRAVENOUS at 08:08

## 2018-08-05 RX ADMIN — IOHEXOL 75 ML: 350 INJECTION, SOLUTION INTRAVENOUS at 08:08

## 2018-08-05 RX ADMIN — OXYCODONE HYDROCHLORIDE AND ACETAMINOPHEN 1 TABLET: 5; 325 TABLET ORAL at 10:08

## 2018-08-05 RX ADMIN — INSULIN ASPART 2 UNITS: 100 INJECTION, SOLUTION INTRAVENOUS; SUBCUTANEOUS at 12:08

## 2018-08-05 RX ADMIN — PIPERACILLIN AND TAZOBACTAM 4.5 G: 4; .5 INJECTION, POWDER, LYOPHILIZED, FOR SOLUTION INTRAVENOUS; PARENTERAL at 05:08

## 2018-08-05 RX ADMIN — OXYCODONE HYDROCHLORIDE AND ACETAMINOPHEN 1 TABLET: 5; 325 TABLET ORAL at 08:08

## 2018-08-05 RX ADMIN — IOHEXOL 15 ML: 300 INJECTION, SOLUTION INTRAVENOUS at 08:08

## 2018-08-05 NOTE — PLAN OF CARE
"TN to patient's room to discuss Helping the patient manage care at home.   TN/SW role explained to pt.     TN's name and contact info placed on white board.     Person who will help at home if needed:  Mother    Preferred pharmacy:   [facility] [place], [place] - [address] [place] [address]  [address] [facility] [address]  Phone: [phone] Fax: [phone]      "Help at home Questions" discussed and placed in "My Health Packet" and placed at bedside.     Preferred Appointment time:  No preference        08/05/18 1646   Discharge Assessment   Assessment Type Discharge Planning Assessment   Confirmed/corrected address and phone number on facesheet? Yes   Assessment information obtained from? Patient   Expected Length of Stay (days) 3   Communicated expected length of stay with patient/caregiver yes   Prior to hospitilization cognitive status: Alert/Oriented   Prior to hospitalization functional status: Independent   Current cognitive status: Alert/Oriented   Current Functional Status: Needs Assistance   Lives With parent(s)   Able to Return to Prior Arrangements yes   Is patient able to care for self after discharge? Yes   Patient's perception of discharge disposition home or selfcare   Readmission Within The Last 30 Days no previous admission in last 30 days   Patient currently being followed by outpatient case management? No   Patient currently receives any other outside agency services? No   Equipment Currently Used at Home cane, straight;glucometer   Do you have any problems affording any of your prescribed medications? No   Is the patient taking medications as prescribed? yes   Does the patient have transportation home? Yes   Transportation Available family or friend will provide   Does the patient receive services at the [facility]? No   Discharge Plan A Home with family   Discharge Plan B (tbd)   Patient/Family In Agreement With Plan yes           "

## 2018-08-05 NOTE — NURSING
DR LO HAS RECEIVED TWO CALLS AND A SECURE CHAT IN AN ATTEMPT TO TALK TO HIM ABOUT THIS PT'S DIABETIC NEEDS. AWAITING DR'S RETURN CALL OR SECURE CHAT REGARDING PT'S DIABETIC NEEDS.

## 2018-08-05 NOTE — HPI
Cali Mabry is a 52 y.o. male that (in part)  has a past medical history of Diabetes mellitus; Hepatitis; HTN (hypertension); and Pancreatitis.  has a past surgical history that includes Shoulder surgery (2013) and Cardiac surgery (1999).   Presents to [facility] Emergency Department Complaining of not feeling well.  He has been using IV heroin and marijuana.  Associated symptoms include fever, chills, generalized fatigue, malaise, chest pain, shortness of breath, and 1 episode of vomiting.  Denies abdominal pain. No sore throat nasal congestion, sinus issues, or ear problems. Denies hematemesis, melena, or hematochezia.  No known skin wounds or lesions. Denies diarrhea.  Generalized radiation.  No relieving factors.  No known exacerbating factors.  Constant duration.  Frequency of 1 episode beginning today.    Emergency department routine laboratory studies, chest x-ray, urinalysis, drug screen, and blood cultures were obtained.  He was noted to be febrile to 100.8° F.  He was given ibuprofen and started on broad-spectrum antibiotics due to risk of bacteremia with history of IV drug abuse.  He was and was given Narcan which reversed his somnolence.  His symptoms were partially thought to be secondary to opioid withdrawal.  A CT study was scheduled but the patient refused to drink the oral contrast and therefore the study was delayed.  He did have erythema to the right anterior cubital fossa near injection site but there is no obvious evidence of induration or abscess.     Hospital medicine has been asked to admit for further evaluation and treatment.

## 2018-08-05 NOTE — H&P
[facility] Hospital Medicine  History & Physical    Patient Name: Cali Mabry  MRN: [id number]  Admission Date: 08/05/2018  Attending Physician: Hans Wynn MD, MPH      PCP:     Jacob Montanez MD    CC:     Chief Complaint   Patient presents with    Ingestion     Pt admits to heroin and weed use today.    + nausea and vomitiitn noted.       HISTORY OF PRESENT ILLNESS:     Cali Mabry is a 52 y.o. male that (in part)  has a past medical history of Diabetes mellitus; Hepatitis; HTN (hypertension); and Pancreatitis.  has a past surgical history that includes Shoulder surgery (2013) and Cardiac surgery (1999).   Presents to [facility] Emergency Department Complaining of not feeling well.  He has been using IV heroin and marijuana.  Associated symptoms include fever, chills, generalized fatigue, malaise, chest pain, shortness of breath, and 1 episode of vomiting.  Denies abdominal pain. No sore throat nasal congestion, sinus issues, or ear problems. Denies hematemesis, melena, or hematochezia.  No known skin wounds or lesions. Denies diarrhea.  Generalized radiation.  No relieving factors.  No known exacerbating factors.  Constant duration.  Frequency of 1 episode beginning today.    Emergency department routine laboratory studies, chest x-ray, urinalysis, drug screen, and blood cultures were obtained.  He was noted to be febrile to 100.8° F.  He was given ibuprofen and started on broad-spectrum antibiotics due to risk of bacteremia with history of IV drug abuse.  He was and was given Narcan which reversed his somnolence.  His symptoms were partially thought to be secondary to opioid withdrawal.  A CT study was scheduled but the patient refused to drink the oral contrast and therefore the study was delayed.  He did have erythema to the right anterior cubital fossa near injection site but there is no obvious evidence of induration or abscess.  No  Janeway lesions or Osler's nodes, or splinter hemorrhages.    Hospital medicine has been asked to admit for further evaluation and treatment.       REVIEW OF SYSTEMS:     -- Constitutional:  Fever, chills, generalized malaise.  -- Eyes: No visual changes, diplopia, pain, tearing, blind spots, or discharge.   -- Ears, nose, mouth, throat, and face: No congestion, sore throat, epistaxis, d/c, bleeding gums, neck stiffness masses, or dental issues.  -- Respiratory:  Shortness of breath.  No cough, hemoptysis, stridor, wheezing, or night sweats.  -- Cardiovascular:  Vague chest pain. No GUPTA, syncope, PND, edema, cyanosis, or palpitations.   -- Gastrointestinal:  Nausea with 1 episode of nonbloody nonbilious vomiting.  No abdominal pain, hematemesis, melena, dyspepsia, or change in bowel habits.  -- Genitourinary: No hematuria, dysuria, frequency, urgency, nocturia, polyuria, stones, or incontinence.  -- Integument/breast: No rash, pruritis, pigmentation changes, dryness, or changes in hair  -- Hematologic/lymphatic: No easy bruising or lymphadenopathy.   -- Musculoskeletal:  Generalized body aches and back pain.  No joint swelling, acute limitations of ROM, or acute muscular weakness.  -- Neurological: No seizures, headaches, incoordination, paraesthesias, ataxia, vertigo, or tremors.  -- Behavioral/Psych:  History of marijuana, cocaine, and heroin abuse.  No auditory or visual hallucinations, depression, or suicidal/homicidal ideations.  -- Endocrine: No heat or cold intolerance, polydipsia, or unintentional weight gain / loss.  -- Allergy/Immunologic: No recurrent infections or adverse reaction to food, insects, or difficulty breathing.      PAST MEDICAL / SURGICAL HISTORY:     Past Medical History:   Diagnosis Date    Diabetes mellitus     Hepatitis     HTN (hypertension)     Pancreatitis      Past Surgical History:   Procedure Laterality Date    CARDIAC SURGERY  1999    stent placed. ([facility])     SHOULDER SURGERY  2013    right shoulder, spur removal.         FAMILY HISTORY:     Family History   Problem Relation Age of Onset    Asthma Mother          SOCIAL HISTORY:     Social History     Social History    Marital status: Single     Spouse name: N/A    Number of children: N/A    Years of education: N/A     Social History Main Topics    Smoking status: Current Every Day Smoker     Packs/day: 1.00     Years: 20.00     Types: Cigarettes     Start date: 4/3/1981    Smokeless tobacco: Never Used    Alcohol use No    Drug use: Yes     Types: IV, Marijuana, Heroin      Comment: currently, used today    Sexual activity: Yes     Partners: Female     Other Topics Concern    None     Social History Narrative    None         ALLERGIES:       Review of patient's allergies indicates:  No Known Allergies      HEALTH SCREENING:     Prevnar 13 pneumonia vaccine = no evidence of previous vaccination found in the medical record      HOME MEDICATIONS:     Prior to Admission medications    Medication Sig Start Date End Date Taking? Authorizing Provider   ibuprofen (ADVIL,MOTRIN) 600 MG tablet Take 1 tablet (600 mg total) by mouth every 6 (six) hours as needed for Pain. 2/1/18  Yes Mariah Lim NP   atorvastatin (LIPITOR) 40 MG tablet Take 40 mg by mouth once daily.    Historical Provider, MD   gabapentin (NEURONTIN) 300 MG capsule Take 1 capsule (300 mg total) by mouth 2 (two) times daily. 11/25/17 12/25/17  Shawnee Nicholas MD   hydrocodone-acetaminophen 5-325mg (NORCO) 5-325 mg per tablet Take 1 tablet by mouth every 6 (six) hours as needed for Pain. Please do not drink alcohol, drive, operate heavy machinery, work or swim while taking this medication as it can cause mental impairment. 2/1/18   Mariah Lim NP   insulin aspart (NOVOLOG) 100 unit/mL InPn pen Inject 10 Units into the skin 3 (three) times daily. 11/25/17 11/25/18  Shawnee Nicholas MD   LANTUS 100 unit/mL injection Inject 15 Units into  "the skin 2 (two) times daily. 11/25/17 1/23/18  Shawnee Nicholas MD   nicotine (NICODERM CQ) 21 mg/24 hr Place 1 patch onto the skin once daily. 11/26/17   Shawnee Nicholas MD   omeprazole (PRILOSEC) 40 MG capsule Take 1 capsule (40 mg total) by mouth once daily. 11/25/17 11/25/18  Shawnee Nicholas MD   oxyCODONE-acetaminophen (PERCOCET) 5-325 mg per tablet Take 1 tablet by mouth every 6 (six) hours as needed for Pain. 1/23/18   Darrell Pryor MD          [facility] MEDICATIONS:     Scheduled Meds:    atorvastatin  40 mg Oral Daily    nicotine  1 patch Transdermal Daily    pantoprazole 40 mg in dextrose 5 % 100 mL infusion (ready to mix system)  40 mg Intravenous Daily    piperacillin-tazobactam (ZOSYN) IVPB  4.5 g Intravenous Q8H    vancomycin (VANCOCIN) IVPB  15 mg/kg Intravenous Q12H     Continuous Infusions:    sodium chloride 0.9% 125 mL/hr at 08/04/18 2042     PRN Meds: acetaminophen, dextrose 50%, glucagon (human recombinant), insulin aspart U-100, ondansetron, oxyCODONE-acetaminophen      PHYSICAL EXAM:     Wt Readings from Last 1 Encounters:   08/04/18 2020 57.2 kg (126 lb)   08/04/18 1714 59.4 kg (131 lb)   08/04/18 1419 13.6 kg (30 lb)     Body mass index is 19.16 kg/m².  Vitals:    08/04/18 1858 08/04/18 1938 08/04/18 2020 08/04/18 2326   BP:  111/66 111/66 120/60   BP Location:  Left arm Right arm Right arm   Patient Position:  Lying Lying Lying   Pulse:  71 71 65   Resp:  20 20 20   Temp: 99.7 °F (37.6 °C) 98.8 °F (37.1 °C) 98.8 °F (37.1 °C) 97.8 °F (36.6 °C)   TempSrc: Oral Oral Oral Oral   SpO2:  96% 96% 96%   Weight:   57.2 kg (126 lb)    Height:   5' 8" (1.727 m)           -- General appearance:  Chronically ill-appearing middle-aged male in was lying in bed.  well developed. appears stated age   -- Head: normocephalic, atraumatic   -- Eyes: conjunctivae clear. Extraocular muscles intact  -- Nose: Nares normal. Septum midline.   -- Mouth/Throat: lips, mucosa, and tongue normal. no " throat erythema.   -- Neck: supple, symmetrical, trachea midline, no JVD and thyroid not grossly enlarged, appears symmetric  -- Lungs: clear to auscultation bilaterally. normal respiratory effort. No use of accessory muscles.   -- Chest wall: no tenderness. equal bilateral chest rise   -- Heart: regular rate and regular rhythm. S1, S2 normal.  no click, rub or gallop   -- Abdomen: soft, non-tender, non-distended, non-tympanic; bowel sounds normal; no masses  -- Extremities:  Decreased muscle mass.  no cyanosis, clubbing or edema.   -- Pulses: 2+ and symmetric   -- Skin:  No obvious source of infection other than erythema to the right anterior cubital fossa near injection site but there is no obvious evidence of induration or abscess.  No Janeway lesions or Osler's nodes, or splinter hemorrhages.  -- Neurologic: Normal strength and tone. No focal numbness or weakness. CNII-XII intact. Thibodaux coma scale: eyes open spontaneously-4, oriented & converses-5, obeys commands-6.      LABORATORY STUDIES:     Recent Results (from the past 36 hour(s))   POCT glucose    Collection Time: 08/04/18  2:31 PM   Result Value Ref Range    POCT Glucose 76 70 - 110 mg/dL   Blood culture x two cultures. Draw prior to antibiotics.    Collection Time: 08/04/18  3:20 PM   Result Value Ref Range    Blood Culture, Routine No Growth to date    CBC auto differential    Collection Time: 08/04/18  3:20 PM   Result Value Ref Range    WBC 6.16 3.90 - 12.70 K/uL    RBC 4.44 (L) 4.60 - 6.20 M/uL    Hemoglobin 11.8 (L) 14.0 - 18.0 g/dL    Hematocrit 35.9 (L) 40.0 - 54.0 %    MCV 81 (L) 82 - 98 fL    MCH 26.6 (L) 27.0 - 31.0 pg    MCHC 32.9 32.0 - 36.0 g/dL    RDW 15.5 (H) 11.5 - 14.5 %    Platelets 258 150 - 350 K/uL    MPV 9.7 9.2 - 12.9 fL    Gran # (ANC) 5.0 1.8 - 7.7 K/uL    Lymph # 0.6 (L) 1.0 - 4.8 K/uL    Mono # 0.4 0.3 - 1.0 K/uL    Eos # 0.1 0.0 - 0.5 K/uL    Baso # 0.01 0.00 - 0.20 K/uL    Gran% 80.7 (H) 38.0 - 73.0 %    Lymph% 9.9 (L) 18.0  - 48.0 %    Mono% 7.1 4.0 - 15.0 %    Eosinophil% 1.8 0.0 - 8.0 %    Basophil% 0.2 0.0 - 1.9 %    Differential Method Automated    Comprehensive metabolic panel    Collection Time: 08/04/18  3:20 PM   Result Value Ref Range    Sodium 137 136 - 145 mmol/L    Potassium 4.2 3.5 - 5.1 mmol/L    Chloride 101 95 - 110 mmol/L    CO2 28 23 - 29 mmol/L    Glucose 71 70 - 110 mg/dL    BUN, Bld 13 6 - 20 mg/dL    Creatinine 0.9 0.5 - 1.4 mg/dL    Calcium 9.6 8.7 - 10.5 mg/dL    Total Protein 8.1 6.0 - 8.4 g/dL    Albumin 3.6 3.5 - 5.2 g/dL    Total Bilirubin 0.4 0.1 - 1.0 mg/dL    Alkaline Phosphatase 82 55 - 135 U/L    AST 18 10 - 40 U/L    ALT 18 10 - 44 U/L    Anion Gap 8 8 - 16 mmol/L    eGFR if African American >60 >60 mL/min/1.73 m^2    eGFR if non African American >60 >60 mL/min/1.73 m^2   Lactic acid, plasma #1    Collection Time: 08/04/18  3:20 PM   Result Value Ref Range    Lactate (Lactic Acid) 1.0 0.5 - 2.2 mmol/L   Magnesium    Collection Time: 08/04/18  3:20 PM   Result Value Ref Range    Magnesium 2.3 1.6 - 2.6 mg/dL   Phosphorus    Collection Time: 08/04/18  3:20 PM   Result Value Ref Range    Phosphorus 2.7 2.7 - 4.5 mg/dL   APTT    Collection Time: 08/04/18  3:20 PM   Result Value Ref Range    aPTT 29.3 21.0 - 32.0 sec   Protime-INR    Collection Time: 08/04/18  3:20 PM   Result Value Ref Range    Prothrombin Time 10.4 9.0 - 12.5 sec    INR 1.0 0.8 - 1.2   Brain natriuretic peptide    Collection Time: 08/04/18  3:20 PM   Result Value Ref Range     (H) 0 - 99 pg/mL   Lipase    Collection Time: 08/04/18  3:20 PM   Result Value Ref Range    Lipase 14 4 - 60 U/L   Troponin I    Collection Time: 08/04/18  3:20 PM   Result Value Ref Range    Troponin I <0.006 0.000 - 0.026 ng/mL   Ethanol    Collection Time: 08/04/18  3:20 PM   Result Value Ref Range    Alcohol, Medical, Serum <10 <10 mg/dL   Blood culture x two cultures. Draw prior to antibiotics.    Collection Time: 08/04/18  3:31 PM   Result Value Ref  Range    Blood Culture, Routine No Growth to date    Urinalysis    Collection Time: 08/04/18  4:42 PM   Result Value Ref Range    Specimen UA Urine, Clean Catch     Color, UA Yellow Yellow, Straw, Monet    Appearance, UA Clear Clear    pH, UA 5.0 5.0 - 8.0    Specific Gravity, UA 1.020 1.005 - 1.030    Protein, UA Negative Negative    Glucose, UA Negative Negative    Ketones, UA Negative Negative    Bilirubin (UA) Negative Negative    Occult Blood UA Negative Negative    Nitrite, UA Negative Negative    Urobilinogen, UA Negative <2.0 EU/dL    Leukocytes, UA Negative Negative   Drug screen panel, emergency    Collection Time: 08/04/18  4:42 PM   Result Value Ref Range    Benzodiazepines Negative     Methadone metabolites Negative     Cocaine (Metab.) Presumptive Positive     Opiate Scrn, Ur Presumptive Positive     Barbiturate Screen, Ur Negative     Amphetamine Screen, Ur Negative     THC Negative     Phencyclidine Negative     Creatinine, Random Ur 87.0 23.0 - 375.0 mg/dL    Toxicology Information SEE COMMENT    Blood Culture #1 **CANNOT BE ORDERED STAT**    Collection Time: 08/04/18  5:35 PM   Result Value Ref Range    Blood Culture, Routine No Growth to date    Blood Culture #2 **CANNOT BE ORDERED STAT**    Collection Time: 08/04/18  5:41 PM   Result Value Ref Range    Blood Culture, Routine No Growth to date    Lactic acid, plasma #2    Collection Time: 08/04/18  6:15 PM   Result Value Ref Range    Lactate (Lactic Acid) 0.8 0.5 - 2.2 mmol/L   POCT glucose    Collection Time: 08/05/18 12:36 AM   Result Value Ref Range    POCT Glucose 250 (H) 70 - 110 mg/dL       Lab Results   Component Value Date    INR 1.0 08/04/2018    INR 1.1 11/22/2017    INR 0.9 07/25/2010     Lab Results   Component Value Date    HGBA1C 8.5 (H) 11/23/2017     Recent Labs      08/04/18   1431  08/05/18   0036   POCTGLUCOSE  76  250*           MICROBIOLOGY DATA:     Urine Culture, Routine   Date Value Ref Range Status   11/22/2017 No growth   Final       Microbiology x 7d:   Microbiology Results (last 7 days)     Procedure Component Value Units Date/Time    Blood culture x two cultures. Draw prior to antibiotics. [[id number]] Collected:  08/04/18 1520    Order Status:  Completed Specimen:  Blood from Peripheral, Antecubital, Right Updated:  08/05/18 0312     Blood Culture, Routine No Growth to date    Narrative:       Aerobic and anaerobic    Blood culture x two cultures. Draw prior to antibiotics. [[id number]] Collected:  08/04/18 1531    Order Status:  Completed Specimen:  Blood from Peripheral, Hand, Left Updated:  08/05/18 0312     Blood Culture, Routine No Growth to date    Narrative:       Aerobic and anaerobic    Blood Culture #2 **CANNOT BE ORDERED STAT** [[id number]] Collected:  08/04/18 1741    Order Status:  Completed Specimen:  Blood from Peripheral, Hand, Left Updated:  08/05/18 0312     Blood Culture, Routine No Growth to date    Blood Culture #1 **CANNOT BE ORDERED STAT** [[id number]] Collected:  08/04/18 1735    Order Status:  Completed Specimen:  Blood from Peripheral, Antecubital, Right Updated:  08/05/18 0312     Blood Culture, Routine No Growth to date    Urine culture [[id number]] Collected:  08/04/18 1642    Order Status:  Sent Specimen:  Urine from Urine, Clean Catch Updated:  08/04/18 1700            IMAGING:     Imaging Results          X-Ray Chest AP Portable (Final result)  Result time 08/04/18 15:11:08    Final result by Jered Cheatham MD (08/04/18 15:11:08)                 Impression:      No evidence of acute cardiopulmonary disease.    1 cm nodular opacity over the right lower lung zone, possibly a nipple shadow.  Recommend repeat radiographs with nipple markers if/when clinically appropriate.      Electronically signed by: Jered Cheatham MD  Date:    08/04/2018  Time:    15:11             Narrative:    EXAMINATION:  XR CHEST AP PORTABLE    CLINICAL HISTORY:  Sepsis;    TECHNIQUE:  Single frontal view of the chest was  performed.    COMPARISON:  11/23/2017    FINDINGS:  Multiple overlying cardiac monitoring leads.  Cardiomediastinal silhouette is normal in size.  Pulmonary vascularity within normal limits.    1 cm nodular opacity projects over the right lower lung zone (overlapping the 6th rib anteriorly).  No confluent opacity.  No significant volume of pleural fluid or pneumothorax.                                     ASSESSMENT & PLAN:     Primary Diagnosis:  Fever    Active Hospital Problems    Diagnosis  POA    *Fever [R50.9]  Yes     Priority: 1 - High    Heroin abuse [F11.10]  Yes     Priority: 2     Intravenous drug abuse [F19.10]  Yes     Priority: 2      Chronic    Cocaine abuse [F14.10]  Yes     Priority: 3     Marijuana abuse [F12.10]  Yes     Priority: 3     Opioid dependence, continuous [F11.20]  Yes     Priority: 3     Chronic hepatitis C [B18.2]  Yes     Priority: 4      Chronic    Tobacco abuse [Z72.0]  Yes     Priority: 5      Chronic    Hyperlipidemia [E78.5]  Yes     Chronic    Type 2 diabetes mellitus, uncontrolled [E11.65]  Yes     Chronic    Essential hypertension [I10]  Yes     Chronic      Resolved Hospital Problems    Diagnosis Date Resolved POA   No resolved problems to display.     Fever of unknown etiology in patient with IV drug abuse history  No obvious source of infection other than erythema to the right anterior cubital fossa near injection site but there is no obvious evidence of induration or abscess.  No Janeway lesions or Osler's nodes, or splinter hemorrhages. Chest x-ray, urinalysis, drug screen, and blood cultures were obtained.  He was noted to be febrile to 101° F.  He was given ibuprofen and started on broad-spectrum antibiotics due to risk of bacteremia with history of IV drug abuse.  He was and was given Narcan which reversed his somnolence.  A CT study of the abdomen and pelvis  was scheduled but the patient refused to drink the oral contrast and therefore the study was  delayed/postponed.    · 1/4 sirs criteria without evidence of sepsis.    · Lactic acid 1.0  · Follow-up cultures  · Follow-up with CT study  · Patient is complaining of back pain and may have diskitis related to the IV drug use and possible bacteremia  · his symptoms are partially thought to be secondary to opioid withdrawal as well.      Polysubstance abuse   · Chronic tobacco use  · Chronic illicit drug use - IV heroin abuse  · Marijuana abuse  · Case management consult to provide community resources for substance abuse  · Nicotine patch offered; considered Wellbutrin or Chantix through PCP as an outpatient (will require closer monitoring)  · Tobacco cessation counseling: I discussed with the patient regarding the hazardous effects of smoking on increasing risk of heart attack and stroke, worsening lung functions, and increasing cancer risk.   Patient was urged to stop smoking now.  I also offered nicotine taper (such as nicotine patch and gum) to help ease the craving to smoke.    Diabetes mellitus type 2  · BG in acceptable range at this time  · Maintain w/ subcutaneous insulin management order set  · Hold oral diabetic meds  · ADA 1800 kcal diet  · BG goal while in patient is <180mg/dL  · HgA1c = Pending    Hypertension  · Goal while inpatient is a systolic blood pressure less than 160mmHg  · BP in acceptable range at this time  · Continue current home regimen with hold parameters  · PRN antihypertensives available    Hyperlipidemia   · Lipid panel - as an outpatient  · Cardiac diet  · Continue statin          VTE Risk Mitigation         Ordered     IP VTE LOW RISK PATIENT  Once      08/04/18 2018     Place DAWIT hose  Until discontinued      08/04/18 2018            Adult PRN medications available   DVT prophylaxis given       DISPOSITION:     Will admit to the Hospital Medicine service for further evaluation and treatment.    Chart reviewed and updated where applicable.    High Risk Conditions:  Patient has a  condition that poses threat to life and bodily function:  Fever with unknown source in patient with IV drug use history      ===============================================================    Hans Wynn MD, MPH  Department of Hospital Medicine   [facility]  [phone]   (7pm - 6am)          This note is dictated using Dragon Medical 360 voice recognition software.  There are word recognition mistakes that are occasionally missed on review.

## 2018-08-05 NOTE — PLAN OF CARE
Problem: Fall Risk (Adult)  Goal: Absence of Falls  Patient will demonstrate the desired outcomes by discharge/transition of care.    08/04/18 1187   Fall Risk (Adult)   Absence of Falls making progress toward outcome

## 2018-08-05 NOTE — PLAN OF CARE
Problem: Diabetes, Type 2 (Adult)  Goal: Signs and Symptoms of Listed Potential Problems Will be Absent, Minimized or Managed (Diabetes, Type 2)  Signs and symptoms of listed potential problems will be absent, minimized or managed by discharge/transition of care (reference Diabetes, Type 2 (Adult) CPG).    08/04/18 6066   Diabetes, Type 2   Problems Assessed (Type 2 Diabetes) all   Problems Present (Type 2 Diabetes) situational response

## 2018-08-05 NOTE — NURSING
PT CONTINUES TO HAVE PAIN IN ABDOMEN. CALLED DR LO TO SEE IF PT COULD HAVE MORE THAN THE SCHEDULED TYLENOL. WAITING FOR  TO CALL BACK.

## 2018-08-06 VITALS
RESPIRATION RATE: 20 BRPM | WEIGHT: 126 LBS | TEMPERATURE: 98 F | OXYGEN SATURATION: 98 % | BODY MASS INDEX: 19.1 KG/M2 | SYSTOLIC BLOOD PRESSURE: 147 MMHG | DIASTOLIC BLOOD PRESSURE: 81 MMHG | HEART RATE: 74 BPM | HEIGHT: 68 IN

## 2018-08-06 LAB
ANION GAP SERPL CALC-SCNC: 8 MMOL/L
BACTERIA UR CULT: NO GROWTH
BASOPHILS # BLD AUTO: 0.01 K/UL
BASOPHILS NFR BLD: 0.2 %
BUN SERPL-MCNC: 16 MG/DL
CALCIUM SERPL-MCNC: 8.4 MG/DL
CHLORIDE SERPL-SCNC: 102 MMOL/L
CO2 SERPL-SCNC: 23 MMOL/L
CREAT SERPL-MCNC: 1.2 MG/DL
DIFFERENTIAL METHOD: ABNORMAL
EOSINOPHIL # BLD AUTO: 0.1 K/UL
EOSINOPHIL NFR BLD: 2.4 %
ERYTHROCYTE [DISTWIDTH] IN BLOOD BY AUTOMATED COUNT: 14.9 %
EST. GFR  (AFRICAN AMERICAN): >60 ML/MIN/1.73 M^2
EST. GFR  (NON AFRICAN AMERICAN): >60 ML/MIN/1.73 M^2
ESTIMATED AVG GLUCOSE: 154 MG/DL
GLUCOSE SERPL-MCNC: 690 MG/DL
GLUCOSE SERPL-MCNC: 690 MG/DL
HBA1C MFR BLD HPLC: 7 %
HCT VFR BLD AUTO: 32.4 %
HGB BLD-MCNC: 10.7 G/DL
LYMPHOCYTES # BLD AUTO: 1.1 K/UL
LYMPHOCYTES NFR BLD: 26.6 %
MCH RBC QN AUTO: 26.1 PG
MCHC RBC AUTO-ENTMCNC: 33 G/DL
MCV RBC AUTO: 79 FL
MONOCYTES # BLD AUTO: 0.6 K/UL
MONOCYTES NFR BLD: 14.7 %
NEUTROPHILS # BLD AUTO: 2.3 K/UL
NEUTROPHILS NFR BLD: 55.9 %
PLATELET # BLD AUTO: 238 K/UL
PMV BLD AUTO: 10 FL
POCT GLUCOSE: 298 MG/DL (ref 70–110)
POCT GLUCOSE: >500 MG/DL (ref 70–110)
POCT GLUCOSE: >500 MG/DL (ref 70–110)
POTASSIUM SERPL-SCNC: 4.5 MMOL/L
RBC # BLD AUTO: 4.1 M/UL
RPR SER QL: NORMAL
SODIUM SERPL-SCNC: 133 MMOL/L
VANCOMYCIN TROUGH SERPL-MCNC: 10.3 UG/ML
WBC # BLD AUTO: 4.14 K/UL

## 2018-08-06 PROCEDURE — 63600175 PHARM REV CODE 636 W HCPCS: Performed by: EMERGENCY MEDICINE

## 2018-08-06 PROCEDURE — G0378 HOSPITAL OBSERVATION PER HR: HCPCS

## 2018-08-06 PROCEDURE — 85025 COMPLETE CBC W/AUTO DIFF WBC: CPT

## 2018-08-06 PROCEDURE — 63600175 PHARM REV CODE 636 W HCPCS: Performed by: HOSPITALIST

## 2018-08-06 PROCEDURE — 25000003 PHARM REV CODE 250: Performed by: EMERGENCY MEDICINE

## 2018-08-06 PROCEDURE — 80202 ASSAY OF VANCOMYCIN: CPT

## 2018-08-06 PROCEDURE — 94761 N-INVAS EAR/PLS OXIMETRY MLT: CPT

## 2018-08-06 PROCEDURE — 80048 BASIC METABOLIC PNL TOTAL CA: CPT

## 2018-08-06 PROCEDURE — 25000003 PHARM REV CODE 250: Performed by: HOSPITALIST

## 2018-08-06 PROCEDURE — S4991 NICOTINE PATCH NONLEGEND: HCPCS | Performed by: HOSPITALIST

## 2018-08-06 PROCEDURE — 36415 COLL VENOUS BLD VENIPUNCTURE: CPT

## 2018-08-06 PROCEDURE — 82947 ASSAY GLUCOSE BLOOD QUANT: CPT | Mod: 59

## 2018-08-06 RX ORDER — INSULIN ASPART 100 [IU]/ML
15 INJECTION, SOLUTION INTRAVENOUS; SUBCUTANEOUS
Status: DISCONTINUED | OUTPATIENT
Start: 2018-08-06 | End: 2018-08-06 | Stop reason: HOSPADM

## 2018-08-06 RX ADMIN — SODIUM CHLORIDE: 0.9 INJECTION, SOLUTION INTRAVENOUS at 12:08

## 2018-08-06 RX ADMIN — OXYCODONE HYDROCHLORIDE AND ACETAMINOPHEN 1 TABLET: 5; 325 TABLET ORAL at 03:08

## 2018-08-06 RX ADMIN — VANCOMYCIN HYDROCHLORIDE 750 MG: 750 INJECTION, POWDER, LYOPHILIZED, FOR SOLUTION INTRAVENOUS at 05:08

## 2018-08-06 RX ADMIN — PIPERACILLIN AND TAZOBACTAM 4.5 G: 4; .5 INJECTION, POWDER, LYOPHILIZED, FOR SOLUTION INTRAVENOUS; PARENTERAL at 02:08

## 2018-08-06 RX ADMIN — NICOTINE 1 PATCH: 21 PATCH, EXTENDED RELEASE TRANSDERMAL at 08:08

## 2018-08-06 RX ADMIN — INSULIN DETEMIR 10 UNITS: 100 INJECTION, SOLUTION SUBCUTANEOUS at 12:08

## 2018-08-06 RX ADMIN — INSULIN ASPART 10 UNITS: 100 INJECTION, SOLUTION INTRAVENOUS; SUBCUTANEOUS at 12:08

## 2018-08-06 RX ADMIN — ATORVASTATIN CALCIUM 40 MG: 40 TABLET, FILM COATED ORAL at 08:08

## 2018-08-06 RX ADMIN — INSULIN ASPART 15 UNITS: 100 INJECTION, SOLUTION INTRAVENOUS; SUBCUTANEOUS at 08:08

## 2018-08-06 RX ADMIN — INSULIN ASPART 6 UNITS: 100 INJECTION, SOLUTION INTRAVENOUS; SUBCUTANEOUS at 08:08

## 2018-08-06 NOTE — PROGRESS NOTES
[id number]  Very agitated and pulled out IV threatening to leave without DC paperwork.  Advised CM pt is not waiting for f/u appointments to be made.  DC instructions reviewed.

## 2018-08-06 NOTE — PLAN OF CARE
Problem: Diabetes, Type 2 (Adult)  Goal: Signs and Symptoms of Listed Potential Problems Will be Absent, Minimized or Managed (Diabetes, Type 2)  Signs and symptoms of listed potential problems will be absent, minimized or managed by discharge/transition of care (reference Diabetes, Type 2 (Adult) CPG).   Outcome: Ongoing (interventions implemented as appropriate)   08/06/18 0451   Diabetes, Type 2   Problems Assessed (Type 2 Diabetes) all   Problems Present (Type 2 Diabetes) hyperglycemia       Comments: Results for POLY CHOUDHURY (MRN [id number])    Ref. Range 8/6/2018 00:49   Glucose Latest Ref Range: 70 - 110 mg/dL 690 ()

## 2018-08-06 NOTE — SIGNIFICANT EVENT
Pt sugar greater than 500 via Accucheck.    Notified Dr. Wynn. Orders include: STAT serum glucose, Novolog now 10 units, Levemir now & BID 10 units.     Amparo    Glucose = 690

## 2018-08-06 NOTE — DISCHARGE SUMMARY
[facility] Hospital Medicine  Discharge Summary      Patient Name: Cali Mabry  MRN: [id number]  Admission Date: 8/4/2018  Hospital Length of Stay: 2 days  Discharge Date and Time:  08/06/2018 12:41 PM  Attending Physician: No att. providers found   Discharging Provider: Kalyani Alberto MD  Primary Care Provider: Jacob Montanez MD      HPI:   Cali Mabry is a 52 y.o. male that (in part)  has a past medical history of Diabetes mellitus; Hepatitis; HTN (hypertension); and Pancreatitis.  has a past surgical history that includes Shoulder surgery (2013) and Cardiac surgery (1999).   Presents to [facility] Emergency Department Complaining of not feeling well.  He has been using IV heroin and marijuana.  Associated symptoms include fever, chills, generalized fatigue, malaise, chest pain, shortness of breath, and 1 episode of vomiting.  Denies abdominal pain. No sore throat nasal congestion, sinus issues, or ear problems. Denies hematemesis, melena, or hematochezia.  No known skin wounds or lesions. Denies diarrhea.  Generalized radiation.  No relieving factors.  No known exacerbating factors.  Constant duration.  Frequency of 1 episode beginning today.    Emergency department routine laboratory studies, chest x-ray, urinalysis, drug screen, and blood cultures were obtained.  He was noted to be febrile to 100.8° F.  He was given ibuprofen and started on broad-spectrum antibiotics due to risk of bacteremia with history of IV drug abuse.  He was and was given Narcan which reversed his somnolence.  His symptoms were partially thought to be secondary to opioid withdrawal.  A CT study was scheduled but the patient refused to drink the oral contrast and therefore the study was delayed.  He did have erythema to the right anterior cubital fossa near injection site but there is no obvious evidence of induration or abscess.     Hospital medicine has been asked to  admit for further evaluation and treatment.       * No surgery found *      Hospital Course:   Cali Mabry is a 52 y.o. male that (in part)  has a past medical history of Diabetes mellitus; Hepatitis; HTN (hypertension); and Pancreatitis.  has a past surgical history that includes Shoulder surgery (2013) and Cardiac surgery (1999).   Presents to [facility] Emergency Department Complaining of not feeling well.  He has been using IV heroin and marijuana.  Associated symptoms include fever, chills, generalized fatigue, malaise, chest pain, shortness of breath, and 1 episode of vomiting.  Denies abdominal pain. No sore throat nasal congestion, sinus issues, or ear problems. Denies hematemesis, melena, or hematochezia.  No known skin wounds or lesions. Denies diarrhea.  Generalized radiation.  No relieving factors.  No known exacerbating factors.  Constant duration.   Emergency department routine laboratory studies, chest x-ray, urinalysis, drug screen, and blood cultures were obtained.  He was noted to be febrile to 100.8° F.  He was given ibuprofen and started on broad-spectrum antibiotics due to risk of bacteremia with history of IV drug abuse.  He was and was given Narcan which reversed his somnolence.  His symptoms were partially thought to be secondary to opioid withdrawal.  A CT study was scheduled but the patient refused to drink the oral contrast and therefore the study was delayed.CT abdomen next day show no acute process,  He did have erythema to the right anterior cubital fossa near injection site but there is no obvious evidence of induration or abscess.   He was on empiric IV Abx,his fever is resolved,and blood culture was negative,his fever is resolved,he was tolerating diet.  Patient has been instructed avoid using illicit  Drugs,he has been discharged home with PCP follw up.   His uncontrolled DM with hyperglycemia is improved with basal,prandial and SQ  insulin.  Consults:     No new Assessment & Plan notes have been filed under this hospital service since the last note was generated.  Service: Hospital Medicine    Final Active Diagnoses:    Diagnosis Date Noted POA    PRINCIPAL PROBLEM:  Fever [R50.9] 08/04/2018 Yes    Cocaine abuse [F14.10] 08/05/2018 Yes    Marijuana abuse [F12.10] 08/05/2018 Yes    Heroin abuse [F11.10] 08/05/2018 Yes    Intravenous drug abuse [F19.10] 11/23/2017 Yes     Chronic    Chronic hepatitis C [B18.2] 11/23/2017 Yes     Chronic    Hyperlipidemia [E78.5] 11/23/2017 Yes     Chronic    Tobacco abuse [Z72.0] 11/23/2017 Yes     Chronic    Type 2 diabetes mellitus, uncontrolled [E11.65] 04/03/2016 Yes     Chronic    Essential hypertension [I10] 04/03/2016 Yes     Chronic    Opioid dependence, continuous [F11.20] 04/27/2014 Yes      Problems Resolved During this Admission:    Diagnosis Date Noted Date Resolved POA       Discharged Condition: stable    Disposition: Home or Self Care    Follow Up:  Follow-up Information     Jacob Montanez MD In 3 days.    Specialty:  Family Medicine  Contact information:  [address] [address]  [place] [address]                 Patient Instructions:     Activity as tolerated         Significant Diagnostic Studies: Labs:   BMP:   Recent Labs  Lab 08/04/18  1520 08/05/18  0819 08/06/18  0049   GLU 71 309* 690*  690*    134* 133*   K 4.2 4.6 4.5    100 102   CO2 28 25 23   BUN 13 13 16   CREATININE 0.9 1.0 1.2   CALCIUM 9.6 8.8 8.4*   MG 2.3  --   --     and CBC   Recent Labs  Lab 08/04/18  1520 08/05/18  0819 08/06/18  0049   WBC 6.16 4.55 4.14   HGB 11.8* 12.2* 10.7*   HCT 35.9* 37.3* 32.4*    243 238     Microbiology:   Blood Culture   Lab Results   Component Value Date    LABBLOO No Growth to date 08/04/2018    LABBLOO No Growth to date 08/04/2018    and Urine Culture    Lab Results   Component Value Date    LABURIN No growth 08/04/2018     Radiology: CT scan: CT  ABDOMEN PELVIS WITH CONTRAST:   Results for orders placed or performed during the hospital encounter of 08/04/18   CT Abdomen Pelvis With Contrast    Narrative    EXAMINATION:  CT ABDOMEN PELVIS WITH CONTRAST    CLINICAL HISTORY:  abdominal pain;    TECHNIQUE:  Low dose axial CT images obtained throughout the region of the abdomen and pelvis following the administration of oral and 75 mL Omnipaque 350 intravenous contrast.  Axial, sagittal and coronal reconstructions were performed.    COMPARISON:  11/22/2017    FINDINGS:  Lung bases are clear and the visualized portions of the heart and mediastinum are unremarkable.    The liver, gallbladder, and adrenal glands appear within normal limits.  Spleen is mildly enlarged measuring over 13 cm.  Pancreas mildly atrophic.    Kidneys appear normal. The ureters are decompressed. Urinary bladder unremarkable.    The stomach, small bowel and colon demonstrate no evidence of obstruction or focal inflammation.  Small duodenal diverticulum.  Appendix not confidently visualized.  No focal inflammatory change to specifically indicate an acute appendicitis.    No free air.  Small volume of ascites..    Aorta tapers normally throughout its visualized course.    Osseous structures exhibit mild degenerative changes.  Stable 3 cm well-circumscribed lucent lesion in the proximal right femur.      Impression    Small volume of ascites, new since prior exam.    Mild splenomegaly, stable.    Mild pancreatic atrophy, stable.    Small duodenal diverticulum.    Stable 3 cm well-circumscribed lucent lesion in the proximal right femur.      Electronically signed by: Jered Cheatham MD  Date:    08/05/2018  Time:    09:18       Pending Diagnostic Studies:     Procedure Component Value Units Date/Time    RPR [[id number]] Collected:  08/05/18 0818    Order Status:  Sent Lab Status:  In process Updated:  08/06/18 0733    Specimen:  Blood from Blood          Medications:  Reconciled Home Medications:       Medication List      CONTINUE taking these medications    atorvastatin 40 MG tablet  Commonly known as:  LIPITOR  Take 40 mg by mouth once daily.     gabapentin 300 MG capsule  Commonly known as:  NEURONTIN  Take 1 capsule (300 mg total) by mouth 2 (two) times daily.     ibuprofen 600 MG tablet  Commonly known as:  ADVIL,MOTRIN  Take 1 tablet (600 mg total) by mouth every 6 (six) hours as needed for Pain.     insulin aspart U-100 100 unit/mL Inpn pen  Commonly known as:  NovoLOG  Inject 10 Units into the skin 3 (three) times daily.     LANTUS U-100 INSULIN 100 unit/mL injection  Generic drug:  insulin glargine  Inject 15 Units into the skin 2 (two) times daily.     nicotine 21 mg/24 hr  Commonly known as:  NICODERM CQ  Place 1 patch onto the skin once daily.     omeprazole 40 MG capsule  Commonly known as:  PriLOSEC  Take 1 capsule (40 mg total) by mouth once daily.        STOP taking these medications    HYDROcodone-acetaminophen 5-325 mg per tablet  Commonly known as:  NORCO     oxyCODONE-acetaminophen 5-325 mg per tablet  Commonly known as:  PERCOCET            Indwelling Lines/Drains at time of discharge:   Lines/Drains/Airways          No matching active lines, drains, or airways          Time spent on the discharge of patient: more than 30  minutes  Patient was seen and examined on the date of discharge and determined to be suitable for discharge.         Kalyani Alberto MD  Department of Hospital Medicine  [facility]

## 2018-08-06 NOTE — HOSPITAL COURSE
Cali Mabry is a 52 y.o. male that (in part)  has a past medical history of Diabetes mellitus; Hepatitis; HTN (hypertension); and Pancreatitis.  has a past surgical history that includes Shoulder surgery (2013) and Cardiac surgery (1999).   Presents to [facility] Emergency Department Complaining of not feeling well.  He has been using IV heroin and marijuana.  Associated symptoms include fever, chills, generalized fatigue, malaise, chest pain, shortness of breath, and 1 episode of vomiting.  Denies abdominal pain. No sore throat nasal congestion, sinus issues, or ear problems. Denies hematemesis, melena, or hematochezia.  No known skin wounds or lesions. Denies diarrhea.  Generalized radiation.  No relieving factors.  No known exacerbating factors.  Constant duration.   Emergency department routine laboratory studies, chest x-ray, urinalysis, drug screen, and blood cultures were obtained.  He was noted to be febrile to 100.8° F.  He was given ibuprofen and started on broad-spectrum antibiotics due to risk of bacteremia with history of IV drug abuse.  He was and was given Narcan which reversed his somnolence.  His symptoms were partially thought to be secondary to opioid withdrawal.  A CT study was scheduled but the patient refused to drink the oral contrast and therefore the study was delayed.CT abdomen next day show no acute process,  He did have erythema to the right anterior cubital fossa near injection site but there is no obvious evidence of induration or abscess.   He was on empiric IV Abx,his fever is resolved,and blood culture was negative,his fever is resolved,he was tolerating diet.  Patient has been instructed avoid using illicit  Drugs,he has been discharged home with PCP follw up.

## 2018-08-09 LAB
BACTERIA BLD CULT: NORMAL

## 2018-10-05 ENCOUNTER — HOSPITAL ENCOUNTER (EMERGENCY)
Facility: HOSPITAL | Age: 53
Discharge: HOME | End: 2018-10-06
Attending: EMERGENCY MEDICINE
Payer: MEDICARE

## 2018-10-05 DIAGNOSIS — R06.02 SHORTNESS OF BREATH: ICD-10-CM

## 2018-10-05 DIAGNOSIS — R40.4 ALTERED LEVEL OF CONSCIOUSNESS: Primary | ICD-10-CM

## 2018-10-05 DIAGNOSIS — E16.2 HYPOGLYCEMIA: ICD-10-CM

## 2018-10-05 LAB
ALBUMIN SERPL BCP-MCNC: 3.7 G/DL
ALP SERPL-CCNC: 103 U/L
ALT SERPL W/O P-5'-P-CCNC: 84 U/L
ANION GAP SERPL CALC-SCNC: 14 MMOL/L
AST SERPL-CCNC: 35 U/L
BASOPHILS # BLD AUTO: 0.01 K/UL
BASOPHILS NFR BLD: 0.2 %
BILIRUB SERPL-MCNC: 0.5 MG/DL
BNP SERPL-MCNC: 32 PG/ML
BUN SERPL-MCNC: 16 MG/DL
CALCIUM SERPL-MCNC: 9.3 MG/DL
CHLORIDE SERPL-SCNC: 102 MMOL/L
CO2 SERPL-SCNC: 23 MMOL/L
CREAT SERPL-MCNC: 1 MG/DL
DIFFERENTIAL METHOD: ABNORMAL
EOSINOPHIL # BLD AUTO: 0.1 K/UL
EOSINOPHIL NFR BLD: 1.5 %
ERYTHROCYTE [DISTWIDTH] IN BLOOD BY AUTOMATED COUNT: 17.1 %
EST. GFR  (AFRICAN AMERICAN): >60 ML/MIN/1.73 M^2
EST. GFR  (NON AFRICAN AMERICAN): >60 ML/MIN/1.73 M^2
GLUCOSE SERPL-MCNC: 76 MG/DL
GLUCOSE SERPL-MCNC: 92 MG/DL (ref 70–110)
HCT VFR BLD AUTO: 34.8 %
HGB BLD-MCNC: 11.1 G/DL
LYMPHOCYTES # BLD AUTO: 0.9 K/UL
LYMPHOCYTES NFR BLD: 17.8 %
MAGNESIUM SERPL-MCNC: 2.6 MG/DL
MCH RBC QN AUTO: 26.8 PG
MCHC RBC AUTO-ENTMCNC: 31.9 G/DL
MCV RBC AUTO: 84 FL
MONOCYTES # BLD AUTO: 0.6 K/UL
MONOCYTES NFR BLD: 12.1 %
NEUTROPHILS # BLD AUTO: 3.6 K/UL
NEUTROPHILS NFR BLD: 68.4 %
PLATELET # BLD AUTO: 199 K/UL
PMV BLD AUTO: 9.4 FL
POTASSIUM SERPL-SCNC: 4 MMOL/L
PROT SERPL-MCNC: 8.2 G/DL
RBC # BLD AUTO: 4.14 M/UL
SODIUM SERPL-SCNC: 139 MMOL/L
TROPONIN I SERPL DL<=0.01 NG/ML-MCNC: <0.006 NG/ML
WBC # BLD AUTO: 5.29 K/UL

## 2018-10-05 PROCEDURE — 85025 COMPLETE CBC W/AUTO DIFF WBC: CPT

## 2018-10-05 PROCEDURE — 80053 COMPREHEN METABOLIC PANEL: CPT

## 2018-10-05 PROCEDURE — 83735 ASSAY OF MAGNESIUM: CPT

## 2018-10-05 PROCEDURE — 93010 ELECTROCARDIOGRAM REPORT: CPT | Mod: ,,, | Performed by: INTERNAL MEDICINE

## 2018-10-05 PROCEDURE — 99285 EMERGENCY DEPT VISIT HI MDM: CPT | Mod: 25

## 2018-10-05 PROCEDURE — 82962 GLUCOSE BLOOD TEST: CPT | Mod: 91

## 2018-10-05 PROCEDURE — 83880 ASSAY OF NATRIURETIC PEPTIDE: CPT

## 2018-10-05 PROCEDURE — 93005 ELECTROCARDIOGRAM TRACING: CPT

## 2018-10-05 PROCEDURE — 84484 ASSAY OF TROPONIN QUANT: CPT

## 2018-10-06 VITALS
RESPIRATION RATE: 17 BRPM | HEART RATE: 78 BPM | HEIGHT: 70 IN | WEIGHT: 144 LBS | BODY MASS INDEX: 20.62 KG/M2 | DIASTOLIC BLOOD PRESSURE: 66 MMHG | SYSTOLIC BLOOD PRESSURE: 109 MMHG | OXYGEN SATURATION: 100 % | TEMPERATURE: 98 F

## 2018-10-06 LAB
AMPHET+METHAMPHET UR QL: NEGATIVE
BARBITURATES UR QL SCN>200 NG/ML: NEGATIVE
BENZODIAZ UR QL SCN>200 NG/ML: NEGATIVE
BILIRUB UR QL STRIP: NEGATIVE
BZE UR QL SCN: NORMAL
CANNABINOIDS UR QL SCN: NEGATIVE
CLARITY UR: CLEAR
COLOR UR: YELLOW
CREAT UR-MCNC: 30.8 MG/DL
GLUCOSE UR QL STRIP: ABNORMAL
HGB UR QL STRIP: NEGATIVE
KETONES UR QL STRIP: NEGATIVE
LEUKOCYTE ESTERASE UR QL STRIP: NEGATIVE
METHADONE UR QL SCN>300 NG/ML: NEGATIVE
NITRITE UR QL STRIP: NEGATIVE
OPIATES UR QL SCN: NORMAL
PCP UR QL SCN>25 NG/ML: NEGATIVE
PH UR STRIP: 6 [PH] (ref 5–8)
POCT GLUCOSE: 146 MG/DL (ref 70–110)
POCT GLUCOSE: 92 MG/DL (ref 70–110)
PROT UR QL STRIP: NEGATIVE
SP GR UR STRIP: 1 (ref 1–1.03)
TOXICOLOGY INFORMATION: NORMAL
URN SPEC COLLECT METH UR: ABNORMAL
UROBILINOGEN UR STRIP-ACNC: NEGATIVE EU/DL

## 2018-10-06 PROCEDURE — 82962 GLUCOSE BLOOD TEST: CPT

## 2018-10-06 PROCEDURE — 80307 DRUG TEST PRSMV CHEM ANLYZR: CPT

## 2018-10-06 PROCEDURE — 81003 URINALYSIS AUTO W/O SCOPE: CPT | Mod: 59

## 2018-10-06 NOTE — ED PROVIDER NOTES
Encounter Date: 10/5/2018    SCRIBE #1 NOTE: I, Dulce Victor, am scribing for, and in the presence of,  Yusef Grant MD. I have scribed the following portions of the note - Other sections scribed: HPI, ROS.       History     Chief Complaint   Patient presents with    Hypoglycemia     Pt found unresponsive by family member EMS called out Pt CBG 17 pt given 12 of D50      CC: Hypoglycemia  Patient arrived via EMS    HPI:  This 53 y.o male who has Diabetes mellitus type 1, Hypertension, Pancreatitis, and Hepatitis presents to the ED for an evaluation for hypoglycemia.  Patient reports he was found unresponsive by a family member.  EMS reports upon arrival the patient had an initial CBG 17 and administered the patient D50.  Patient reports he is takes insulin 3-4 times per day.  He reports he can last recall taking his insulin this morning, but can not recall his blood sugar reading this morning at home.  Patient also reports injecting heroin this morning as well.  He reports he can not recall eating today.  Patient reports for the past 2 days, he had been having general malaise.  He denies fever, chills, nausea, emesis, diarrhea, abdominal pain, chest pain, back pain, or any other associated symptoms.       The history is provided by the patient. No  was used.     Review of patient's allergies indicates:  No Known Allergies  Past Medical History:   Diagnosis Date    Diabetes mellitus     Hepatitis     HTN (hypertension)     Pancreatitis      Past Surgical History:   Procedure Laterality Date    CARDIAC SURGERY  1999    stent placed. ([facility])    ESOPHAGOGASTRODUODENOSCOPY (EGD) Left 4/4/2016    Per[facility] at [facility]    SHOULDER SURGERY  2013    right shoulder, spur removal.     Family History   Problem Relation Age of Onset    Asthma Mother      Social History     Tobacco Use    Smoking status: Current Every Day Smoker     Packs/day: 1.00     Years: 20.00      Pack years: 20.00     Types: Cigarettes     Start date: 4/3/1981    Smokeless tobacco: Never Used   Substance Use Topics    Alcohol use: No     Alcohol/week: 0.0 oz    Drug use: Yes     Types: IV, Marijuana, Heroin     Comment: currently, used today     Review of Systems   Constitutional: Negative for chills and fever.        (+) general malaise   HENT: Negative for ear pain and sore throat.    Eyes: Negative for pain.   Respiratory: Negative for cough and shortness of breath.    Cardiovascular: Negative for chest pain.   Gastrointestinal: Negative for abdominal pain, diarrhea, nausea and vomiting.   Genitourinary: Negative for dysuria.   Musculoskeletal: Negative for back pain.   Skin: Negative for rash.   Neurological: Negative for headaches.        (+) unresponsive       Physical Exam     Initial Vitals [10/05/18 2215]   BP Pulse Resp Temp SpO2   118/82 75 18 97.7 °F (36.5 °C) 98 %      MAP       --         Physical Exam    Nursing note and vitals reviewed.  Constitutional: He appears well-developed and well-nourished. He is not diaphoretic. No distress.   HENT:   Head: Normocephalic and atraumatic.   Mouth/Throat: Oropharynx is clear and moist. No oropharyngeal exudate.   Eyes: Conjunctivae and EOM are normal. Pupils are equal, round, and reactive to light. No scleral icterus. Right eye exhibits no nystagmus. Left eye exhibits no nystagmus.   Neck: Normal range of motion. Neck supple. No JVD present.   Cardiovascular: Normal rate, regular rhythm and normal heart sounds. Exam reveals no gallop and no friction rub.    No murmur heard.  Pulses:       Radial pulses are 2+ on the right side, and 2+ on the left side.        Dorsalis pedis pulses are 2+ on the right side, and 2+ on the left side.   No peripheral edema.   Pulmonary/Chest: Effort normal and breath sounds normal. No stridor. No respiratory distress. He has no decreased breath sounds. He has no wheezes. He has no rhonchi. He has no rales.    Abdominal: Soft. Bowel sounds are normal. He exhibits no distension. There is no tenderness.   Musculoskeletal: Normal range of motion.   Neurological: He is alert and oriented to person, place, and time. He has normal strength. No cranial nerve deficit or sensory deficit. Coordination normal. GCS eye subscore is 4. GCS verbal subscore is 5. GCS motor subscore is 6.   Skin: Skin is warm and dry. No pallor.   Psychiatric: He has a normal mood and affect. His behavior is normal.         ED Course   Procedures  Labs Reviewed   CBC W/ AUTO DIFFERENTIAL - Abnormal; Notable for the following components:       Result Value    RBC 4.14 (*)     Hemoglobin 11.1 (*)     Hematocrit 34.8 (*)     MCH 26.8 (*)     MCHC 31.9 (*)     RDW 17.1 (*)     Lymph # 0.9 (*)     Lymph% 17.8 (*)     All other components within normal limits   COMPREHENSIVE METABOLIC PANEL - Abnormal; Notable for the following components:    ALT 84 (*)     All other components within normal limits   TROPONIN I   B-TYPE NATRIURETIC PEPTIDE   MAGNESIUM   URINALYSIS, REFLEX TO URINE CULTURE   DRUG SCREEN PANEL, URINE EMERGENCY   POCT GLUCOSE MONITORING CONTINUOUS   POCT GLUCOSE MONITORING CONTINUOUS     EKG Readings: (Independently Interpreted)   Initial Reading: No STEMI. Rhythm: Normal Sinus Rhythm. Heart Rate: 74. Ectopy: No Ectopy. Conduction: Normal. ST Segments: Normal ST Segments. T Waves: Normal. Axis: Normal.       Imaging Results          X-Ray Chest AP Portable (Final result)  Result time 10/05/18 23:07:49    Final result by New Falcon MD (10/05/18 23:07:49)                 Impression:      No acute process.      Electronically signed by: New Falcon MD  Date:    10/05/2018  Time:    23:07             Narrative:    EXAMINATION:  XR CHEST AP PORTABLE    CLINICAL HISTORY:  Shortness of breath;    TECHNIQUE:  Single frontal view of the chest was performed.    COMPARISON:  08/04/2018.    FINDINGS:  Monitoring EKG leads are present.  The trachea is  unremarkable.  The cardiomediastinal silhouette is within normal limits.  The hilar structures are unremarkable.  The hemidiaphragms are within normal limits.  There is no evidence of free air beneath the hemidiaphragms.  There are no pleural effusions.  There is no evidence of a pneumothorax.  There is no evidence of pneumomediastinum.  No airspace opacities are present.  There are degenerative changes in the osseous structures.  The subcutaneous tissues are within normal limits.                                 Medical Decision Making:   History:   Old Medical Records: I decided to obtain old medical records.  Independently Interpreted Test(s):   I have ordered and independently interpreted EKG Reading(s) - see prior notes  Clinical Tests:   Lab Tests: Ordered and Reviewed  Radiological Study: Ordered and Reviewed  Medical Tests: Ordered and Reviewed  Medical decision making:  This patient was evaluated after being found with decreased level consciousness.  The patient was found to have profound hypoglycemia.  His mental status improved with dextrose administration.  The patient is an insulin-dependent diabetic.  He reports that his last dose of insulin once this morning.  He subsequently used heroin via injection and has no recollection of the events of the day prior to his encounter with the EMS.  The patient was awake and alert at the time of my initial evaluation.  His vital signs were normal. He had a normal neurological examination. He has been monitored here in the emergency department for several hours.  His blood glucoses remained stable over that period of time.  He has a normal EKG.  Chest radiograph is negative for acute processes.  He has no leukocytosis, electrolyte anomalies, or renal insufficiency.  He is stable for discharge at this time.  He has been counseled on the dangers of insulin administration without consuming meals.  Moreover, he has been counseled on the dangers of heroin use.             Scribe Attestation:   Scribe #1: I performed the above scribed service and the documentation accurately describes the services I performed. I attest to the accuracy of the note.    Attending Attestation:           Physician Attestation for Scribe:  Physician Attestation Statement for Scribe #1: I, Yusef Grant MD, reviewed documentation, as scribed by Dulce Victor in my presence, and it is both accurate and complete.                    Clinical Impression:   The primary encounter diagnosis was Altered level of consciousness. Diagnoses of Shortness of breath and Hypoglycemia were also pertinent to this visit.      Disposition:   Disposition: Discharged  Condition: Stable                        Yusef Grant III, MD  10/06/18 0129

## 2018-10-06 NOTE — DISCHARGE INSTRUCTIONS
It is very dangerous to take insulin without eating proper meals.  This danger is compounding greatly with the use of heroin and other illicit substances.  Only administered insulin when you are eating and make every attempt at abstaining from illicit drug use.

## 2019-03-12 ENCOUNTER — HOSPITAL ENCOUNTER (INPATIENT)
Facility: HOSPITAL | Age: 54
LOS: 5 days | Discharge: HOME | DRG: 637 | End: 2019-03-17
Attending: EMERGENCY MEDICINE | Admitting: INTERNAL MEDICINE
Payer: MEDICARE

## 2019-03-12 DIAGNOSIS — R73.9 HYPERGLYCEMIA: ICD-10-CM

## 2019-03-12 DIAGNOSIS — K86.1 ACUTE ON CHRONIC PANCREATITIS: ICD-10-CM

## 2019-03-12 DIAGNOSIS — K86.1 CHRONIC RELAPSING PANCREATITIS: Chronic | ICD-10-CM

## 2019-03-12 DIAGNOSIS — E13.10 DIABETIC KETOACIDOSIS WITHOUT COMA ASSOCIATED WITH OTHER SPECIFIED DIABETES MELLITUS: Primary | ICD-10-CM

## 2019-03-12 DIAGNOSIS — R79.89 ELEVATED TROPONIN: ICD-10-CM

## 2019-03-12 DIAGNOSIS — I50.9 CHF (CONGESTIVE HEART FAILURE): ICD-10-CM

## 2019-03-12 DIAGNOSIS — F19.90 IV DRUG USER: ICD-10-CM

## 2019-03-12 DIAGNOSIS — K85.90 ACUTE ON CHRONIC PANCREATITIS: ICD-10-CM

## 2019-03-12 PROBLEM — N17.9 ACUTE RENAL FAILURE: Status: ACTIVE | Noted: 2019-03-12

## 2019-03-12 PROBLEM — T68.XXXA HYPOTHERMIA: Status: ACTIVE | Noted: 2019-03-12

## 2019-03-12 PROBLEM — E87.5 HYPERKALEMIA: Status: ACTIVE | Noted: 2019-03-12

## 2019-03-12 LAB
ALBUMIN SERPL BCP-MCNC: 4 G/DL
ALLENS TEST: ABNORMAL
ALP SERPL-CCNC: 154 U/L
ALT SERPL W/O P-5'-P-CCNC: 18 U/L
ANION GAP SERPL CALC-SCNC: 12 MMOL/L
ANION GAP SERPL CALC-SCNC: 18 MMOL/L
ANION GAP SERPL CALC-SCNC: 26 MMOL/L (ref 8–16)
ANION GAP SERPL CALC-SCNC: 28 MMOL/L
ANISOCYTOSIS BLD QL SMEAR: SLIGHT
AST SERPL-CCNC: 22 U/L
B-OH-BUTYR BLD STRIP-SCNC: 5.7 MMOL/L
BACTERIA #/AREA URNS HPF: NORMAL /HPF
BASOPHILS # BLD AUTO: 0.05 K/UL
BASOPHILS NFR BLD: 0.3 %
BILIRUB SERPL-MCNC: 0.4 MG/DL
BILIRUB UR QL STRIP: NEGATIVE
BNP SERPL-MCNC: 643 PG/ML
BUN SERPL-MCNC: 29 MG/DL
BUN SERPL-MCNC: 33 MG/DL
BUN SERPL-MCNC: 39 MG/DL
BUN SERPL-MCNC: 45 MG/DL (ref 6–30)
CALCIUM SERPL-MCNC: 8.1 MG/DL
CALCIUM SERPL-MCNC: 8.1 MG/DL
CALCIUM SERPL-MCNC: 9.3 MG/DL
CHLORIDE SERPL-SCNC: 100 MMOL/L
CHLORIDE SERPL-SCNC: 106 MMOL/L
CHLORIDE SERPL-SCNC: 108 MMOL/L (ref 95–110)
CHLORIDE SERPL-SCNC: 109 MMOL/L
CLARITY UR: CLEAR
CO2 SERPL-SCNC: 16 MMOL/L
CO2 SERPL-SCNC: 6 MMOL/L
CO2 SERPL-SCNC: 9 MMOL/L
COLOR UR: ABNORMAL
CREAT SERPL-MCNC: 1.2 MG/DL (ref 0.5–1.4)
CREAT SERPL-MCNC: 1.5 MG/DL
CREAT SERPL-MCNC: 1.7 MG/DL
CREAT SERPL-MCNC: 2.3 MG/DL
DELSYS: ABNORMAL
DIFFERENTIAL METHOD: ABNORMAL
EOSINOPHIL # BLD AUTO: 0 K/UL
EOSINOPHIL NFR BLD: 0.2 %
ERYTHROCYTE [DISTWIDTH] IN BLOOD BY AUTOMATED COUNT: 14.6 %
EST. GFR  (AFRICAN AMERICAN): 36 ML/MIN/1.73 M^2
EST. GFR  (AFRICAN AMERICAN): 52 ML/MIN/1.73 M^2
EST. GFR  (AFRICAN AMERICAN): >60 ML/MIN/1.73 M^2
EST. GFR  (NON AFRICAN AMERICAN): 31 ML/MIN/1.73 M^2
EST. GFR  (NON AFRICAN AMERICAN): 45 ML/MIN/1.73 M^2
EST. GFR  (NON AFRICAN AMERICAN): 53 ML/MIN/1.73 M^2
FIO2: 21
FLOW: 0
GLUCOSE SERPL-MCNC: 336 MG/DL
GLUCOSE SERPL-MCNC: 513 MG/DL
GLUCOSE SERPL-MCNC: 781 MG/DL
GLUCOSE SERPL-MCNC: >500 MG/DL (ref 70–110)
GLUCOSE SERPL-MCNC: >500 MG/DL (ref 70–110)
GLUCOSE SERPL-MCNC: >700 MG/DL (ref 70–110)
GLUCOSE UR QL STRIP: ABNORMAL
HCO3 UR-SCNC: 5.4 MMOL/L (ref 24–28)
HCT VFR BLD AUTO: 46.4 %
HCT VFR BLD CALC: 45 %PCV (ref 36–54)
HGB BLD-MCNC: 13.8 G/DL
HGB UR QL STRIP: ABNORMAL
KETONES UR QL STRIP: ABNORMAL
LACTATE SERPL-SCNC: 4.3 MMOL/L
LEUKOCYTE ESTERASE UR QL STRIP: NEGATIVE
LIPASE SERPL-CCNC: 197 U/L
LYMPHOCYTES # BLD AUTO: 1.9 K/UL
LYMPHOCYTES NFR BLD: 10.9 %
MCH RBC QN AUTO: 27.3 PG
MCHC RBC AUTO-ENTMCNC: 29.7 G/DL
MCV RBC AUTO: 92 FL
MICROSCOPIC COMMENT: NORMAL
MODE: ABNORMAL
MONOCYTES # BLD AUTO: 2.1 K/UL
MONOCYTES NFR BLD: 11.7 %
NEUTROPHILS # BLD AUTO: 13.5 K/UL
NEUTROPHILS NFR BLD: 79.1 %
NITRITE UR QL STRIP: NEGATIVE
PCO2 BLDA: 28.6 MMHG (ref 35–45)
PH SMN: 6.88 [PH] (ref 7.35–7.45)
PH UR STRIP: 5 [PH] (ref 5–8)
PLATELET # BLD AUTO: 431 K/UL
PLATELET BLD QL SMEAR: ABNORMAL
PMV BLD AUTO: 9.9 FL
PO2 BLDA: 42 MMHG (ref 40–60)
POC BE: -27 MMOL/L
POC IONIZED CALCIUM: 1.15 MMOL/L (ref 1.06–1.42)
POC SATURATED O2: 45 % (ref 95–100)
POC TCO2 (MEASURED): 9 MMOL/L (ref 23–29)
POC TCO2: 6 MMOL/L (ref 24–29)
POCT GLUCOSE: 148 MG/DL (ref 70–110)
POCT GLUCOSE: 234 MG/DL (ref 70–110)
POCT GLUCOSE: 289 MG/DL (ref 70–110)
POCT GLUCOSE: 414 MG/DL (ref 70–110)
POCT GLUCOSE: 463 MG/DL (ref 70–110)
POCT GLUCOSE: >500 MG/DL (ref 70–110)
POLYCHROMASIA BLD QL SMEAR: ABNORMAL
POTASSIUM BLD-SCNC: 6.3 MMOL/L (ref 3.5–5.1)
POTASSIUM SERPL-SCNC: 3.8 MMOL/L
POTASSIUM SERPL-SCNC: 4 MMOL/L
POTASSIUM SERPL-SCNC: 6.1 MMOL/L
PROT SERPL-MCNC: 9 G/DL
PROT UR QL STRIP: NEGATIVE
RBC # BLD AUTO: 5.05 M/UL
RBC #/AREA URNS HPF: 2 /HPF (ref 0–4)
SAMPLE: ABNORMAL
SAMPLE: ABNORMAL
SITE: ABNORMAL
SODIUM BLD-SCNC: 135 MMOL/L (ref 136–145)
SODIUM SERPL-SCNC: 133 MMOL/L
SODIUM SERPL-SCNC: 134 MMOL/L
SODIUM SERPL-SCNC: 137 MMOL/L
SP GR UR STRIP: 1.01 (ref 1–1.03)
SQUAMOUS #/AREA URNS HPF: 3 /HPF
TROPONIN I SERPL DL<=0.01 NG/ML-MCNC: 0.05 NG/ML
URN SPEC COLLECT METH UR: ABNORMAL
UROBILINOGEN UR STRIP-ACNC: NEGATIVE EU/DL
WBC # BLD AUTO: 17.56 K/UL
YEAST URNS QL MICRO: NORMAL

## 2019-03-12 PROCEDURE — 20000000 HC ICU ROOM

## 2019-03-12 PROCEDURE — 99291 CRITICAL CARE FIRST HOUR: CPT | Mod: ,,, | Performed by: INTERNAL MEDICINE

## 2019-03-12 PROCEDURE — 83605 ASSAY OF LACTIC ACID: CPT

## 2019-03-12 PROCEDURE — 93010 EKG 12-LEAD: ICD-10-PCS | Mod: ,,, | Performed by: INTERNAL MEDICINE

## 2019-03-12 PROCEDURE — 96368 THER/DIAG CONCURRENT INF: CPT

## 2019-03-12 PROCEDURE — 93010 ELECTROCARDIOGRAM REPORT: CPT | Mod: ,,, | Performed by: INTERNAL MEDICINE

## 2019-03-12 PROCEDURE — 80048 BASIC METABOLIC PNL TOTAL CA: CPT | Mod: 91

## 2019-03-12 PROCEDURE — 99291 PR CRITICAL CARE, E/M 30-74 MINUTES: ICD-10-PCS | Mod: ,,, | Performed by: INTERNAL MEDICINE

## 2019-03-12 PROCEDURE — 99900035 HC TECH TIME PER 15 MIN (STAT)

## 2019-03-12 PROCEDURE — 93005 ELECTROCARDIOGRAM TRACING: CPT

## 2019-03-12 PROCEDURE — 82010 KETONE BODYS QUAN: CPT

## 2019-03-12 PROCEDURE — 63600175 PHARM REV CODE 636 W HCPCS: Performed by: EMERGENCY MEDICINE

## 2019-03-12 PROCEDURE — 80048 BASIC METABOLIC PNL TOTAL CA: CPT

## 2019-03-12 PROCEDURE — 83690 ASSAY OF LIPASE: CPT

## 2019-03-12 PROCEDURE — 25000003 PHARM REV CODE 250: Performed by: EMERGENCY MEDICINE

## 2019-03-12 PROCEDURE — 96365 THER/PROPH/DIAG IV INF INIT: CPT

## 2019-03-12 PROCEDURE — 85025 COMPLETE CBC W/AUTO DIFF WBC: CPT

## 2019-03-12 PROCEDURE — 25000003 PHARM REV CODE 250: Performed by: INTERNAL MEDICINE

## 2019-03-12 PROCEDURE — 84295 ASSAY OF SERUM SODIUM: CPT

## 2019-03-12 PROCEDURE — 83880 ASSAY OF NATRIURETIC PEPTIDE: CPT

## 2019-03-12 PROCEDURE — 84484 ASSAY OF TROPONIN QUANT: CPT

## 2019-03-12 PROCEDURE — 80053 COMPREHEN METABOLIC PANEL: CPT

## 2019-03-12 PROCEDURE — 96361 HYDRATE IV INFUSION ADD-ON: CPT

## 2019-03-12 PROCEDURE — 81000 URINALYSIS NONAUTO W/SCOPE: CPT

## 2019-03-12 PROCEDURE — 84132 ASSAY OF SERUM POTASSIUM: CPT

## 2019-03-12 PROCEDURE — 85014 HEMATOCRIT: CPT

## 2019-03-12 PROCEDURE — 36415 COLL VENOUS BLD VENIPUNCTURE: CPT

## 2019-03-12 PROCEDURE — 96375 TX/PRO/DX INJ NEW DRUG ADDON: CPT

## 2019-03-12 PROCEDURE — 82803 BLOOD GASES ANY COMBINATION: CPT

## 2019-03-12 PROCEDURE — 87040 BLOOD CULTURE FOR BACTERIA: CPT

## 2019-03-12 PROCEDURE — 96366 THER/PROPH/DIAG IV INF ADDON: CPT

## 2019-03-12 PROCEDURE — 82962 GLUCOSE BLOOD TEST: CPT

## 2019-03-12 PROCEDURE — 82565 ASSAY OF CREATININE: CPT

## 2019-03-12 PROCEDURE — 99285 EMERGENCY DEPT VISIT HI MDM: CPT | Mod: 25

## 2019-03-12 PROCEDURE — 82330 ASSAY OF CALCIUM: CPT

## 2019-03-12 RX ORDER — DEXTROSE MONOHYDRATE 100 MG/ML
1000 INJECTION, SOLUTION INTRAVENOUS
Status: DISCONTINUED | OUTPATIENT
Start: 2019-03-12 | End: 2019-03-17 | Stop reason: HOSPADM

## 2019-03-12 RX ORDER — SODIUM CHLORIDE 9 MG/ML
1000 INJECTION, SOLUTION INTRAVENOUS
Status: COMPLETED | OUTPATIENT
Start: 2019-03-12 | End: 2019-03-12

## 2019-03-12 RX ORDER — SODIUM CHLORIDE 9 MG/ML
1000 INJECTION, SOLUTION INTRAVENOUS CONTINUOUS
Status: DISCONTINUED | OUTPATIENT
Start: 2019-03-12 | End: 2019-03-12

## 2019-03-12 RX ORDER — LORAZEPAM 2 MG/ML
1 INJECTION INTRAMUSCULAR
Status: COMPLETED | OUTPATIENT
Start: 2019-03-12 | End: 2019-03-12

## 2019-03-12 RX ORDER — DEXTROSE MONOHYDRATE 100 MG/ML
1000 INJECTION, SOLUTION INTRAVENOUS
Status: DISCONTINUED | OUTPATIENT
Start: 2019-03-12 | End: 2019-03-13

## 2019-03-12 RX ORDER — SODIUM CHLORIDE 0.9 % (FLUSH) 0.9 %
5 SYRINGE (ML) INJECTION
Status: DISCONTINUED | OUTPATIENT
Start: 2019-03-12 | End: 2019-03-13

## 2019-03-12 RX ADMIN — VANCOMYCIN HYDROCHLORIDE 2000 MG: 1 INJECTION, POWDER, FOR SOLUTION INTRAVENOUS at 01:03

## 2019-03-12 RX ADMIN — SODIUM CHLORIDE 10 UNITS/HR: 9 INJECTION, SOLUTION INTRAVENOUS at 05:03

## 2019-03-12 RX ADMIN — SODIUM CHLORIDE 1000 ML: 0.9 INJECTION, SOLUTION INTRAVENOUS at 05:03

## 2019-03-12 RX ADMIN — SODIUM CHLORIDE, SODIUM LACTATE, POTASSIUM CHLORIDE, AND CALCIUM CHLORIDE 1000 ML: .6; .31; .03; .02 INJECTION, SOLUTION INTRAVENOUS at 12:03

## 2019-03-12 RX ADMIN — LORAZEPAM 1 MG: 2 INJECTION INTRAMUSCULAR; INTRAVENOUS at 12:03

## 2019-03-12 RX ADMIN — SODIUM CHLORIDE 7 UNITS/HR: 9 INJECTION, SOLUTION INTRAVENOUS at 12:03

## 2019-03-12 RX ADMIN — SODIUM BICARBONATE: 84 INJECTION, SOLUTION INTRAVENOUS at 03:03

## 2019-03-12 RX ADMIN — SODIUM CHLORIDE 1000 ML: 0.9 INJECTION, SOLUTION INTRAVENOUS at 01:03

## 2019-03-12 RX ADMIN — SODIUM BICARBONATE: 84 INJECTION, SOLUTION INTRAVENOUS at 08:03

## 2019-03-12 RX ADMIN — PIPERACILLIN AND TAZOBACTAM 4.5 G: 4; .5 INJECTION, POWDER, LYOPHILIZED, FOR SOLUTION INTRAVENOUS; PARENTERAL at 12:03

## 2019-03-12 RX ADMIN — SODIUM CHLORIDE 1000 ML: 0.9 INJECTION, SOLUTION INTRAVENOUS at 11:03

## 2019-03-12 NOTE — H&P
[facility] Hospital Medicine  History & Physical    Patient Name: Cali Mabry  MRN: [id number]  Admission Date: 3/12/2019  Attending Physician: Pennie Landry MD   Primary Care Provider: Jacob Montanez MD         Patient information was obtained from parent and ER records.     Subjective:     Principal Problem:Diabetic ketoacidosis    Chief Complaint:   Chief Complaint   Patient presents with    Abdominal Pain     since yesterday.     Vomiting     EMS reports yellow emesis. Also their glucometer read critical high.         HPI: Mr. Mabry is a 54 yo M who presents to the hospital stating he has not felt well for the past 2 days with N/V. Patient is known insulin dependent diabetic. He is also a know IV drug user. His mother notes issues of compliance with his insulin. He presents in DKA with AG of 28 and blood sugar > 700.  Patient is somewhat altered and most of the history is from the patient's mother. The patient presents with multiple electrolyte disorders, hypothermic and in acute renal failure (normal renal function 2018). The patient was started on an insulin and Hc03 drip and will be going to the ICU.  Cause of DKA could be from non-compliance, infection or possibly acute pancreatitis.     No new subjective & objective note has been filed under this hospital service since the last note was generated.    Assessment/Plan:     * Diabetic ketoacidosis    Very severe. And very ill patient.  Reasons could be infection from IV drug use, pancreatitis, and/or compliance issues. Insulin drip and Hc03 for now.  Will cover with Vanc and Zosyn given critical nature of patient.  Will check blood cultures. IV insulin, fluids. BMP every 4 hours.         Type 2 diabetes mellitus, uncontrolled    Will check an A1c. Insulin drip for now.  No known manifestations        IV drug user    With heroin. Will watch for withdrawal. Not sure of last use.          Hypothermia    From acute illness.  Warming blanket        Hyperkalemia    Should correct with fluids and Hc03. Repeat BMP in 4 hours        Acute renal failure    From above and from N/V. IV fluids.  BMP every 4.  Patient had normal function in 2018. Suspect will all correct with IV fluids.          Tobacco abuse    Smoking cessation education when appropriate        Chronic relapsing pancreatitis    May be cause of patient's abdominal pain. Lipase just a little elevated- in setting of volume contraction- may be falsely high.  Consider CT abdomen. NPO        Chronic hepatitis C    No acute issues.        Anemia of chronic disease    No acute issues        Hyperlipidemia    Resume home meds when able.        Essential hypertension    Resume home meds when able.          VTE Risk Mitigation (From admission, onward)    None        Critical care time- 50 minutes.        Tyrese Singh MD  Department of Hospital Medicine   [facility] [place]

## 2019-03-12 NOTE — ED PROVIDER NOTES
"Encounter Date: 3/12/2019    SCRIBE #1 NOTE: I, Dulce Victor, am scribing for, and in the presence of,  Pennie Landry MD. I have scribed the following portions of the note - Other sections scribed: HPI, ROS, PE.       History     Chief Complaint   Patient presents with    Abdominal Pain     since yesterday.     Vomiting     EMS reports yellow emesis. Also their glucometer read critical high.      CC: Abdominal Pain  Patient arrived via EMS    HPI: This 53 y.o male who has DM, Hepatitis, HTN, and Pancreatitis presents to the ED for an evaluation for acute onset, constant, severe generalized abdominal pain with associated nausea and emesis that began yesterday.  Per EMS, patient had a glucose reading greater than 500.  No prior tx.  No alleviating factors.  Further history limited due to patient being distressed and uncooperative and repeatedly stating "I need water"      The history is provided by the patient. No  was used.     Review of patient's allergies indicates:  No Known Allergies  Past Medical History:   Diagnosis Date    Diabetes mellitus     Hepatitis     HTN (hypertension)     Pancreatitis      Past Surgical History:   Procedure Laterality Date    CARDIAC SURGERY  1999    stent placed. ([facility])    ESOPHAGOGASTRODUODENOSCOPY (EGD) Left 4/4/2016    [facility]    SHOULDER SURGERY  2013    right shoulder, spur removal.     Family History   Problem Relation Age of Onset    Asthma Mother      Social History     Tobacco Use    Smoking status: Current Every Day Smoker     Packs/day: 1.00     Years: 20.00     Pack years: 20.00     Types: Cigarettes     Start date: 4/3/1981    Smokeless tobacco: Never Used   Substance Use Topics    Alcohol use: No     Alcohol/week: 0.0 oz    Drug use: Yes     Types: IV, Marijuana, Heroin     Comment: currently, used today     Review of Systems   Unable to perform ROS: Acuity of condition "   Gastrointestinal: Positive for abdominal pain, nausea and vomiting.       Physical Exam     Initial Vitals   BP Pulse Resp Temp SpO2   03/12/19 1021 03/12/19 1021 03/12/19 1021 03/12/19 1058 03/12/19 1021   (!) 148/70 100 (!) 28 (!) 94.1 °F (34.5 °C) 99 %      MAP       --                Physical Exam    Nursing note and vitals reviewed.  Constitutional: He is not diaphoretic. He appears toxic. He appears ill. He appears distressed.   HENT:   Head: Normocephalic and atraumatic.   Dry mucous membranes   Eyes: Conjunctivae and EOM are normal. Pupils are equal, round, and reactive to light. No scleral icterus.   Neck: Normal range of motion. Neck supple. No JVD present.   Cardiovascular: Regular rhythm and intact distal pulses.   Tachycardic   Pulmonary/Chest: Breath sounds normal. No stridor. No respiratory distress.   Abdominal: Soft. Bowel sounds are normal. He exhibits no distension. There is tenderness in the right upper quadrant, epigastric area and left upper quadrant. There is no rebound, no guarding and negative Burger's sign.   Musculoskeletal: Normal range of motion. He exhibits no edema or tenderness.   Neurological: He is alert. He has normal strength. No cranial nerve deficit or sensory deficit. GCS score is 15. GCS eye subscore is 4. GCS verbal subscore is 5. GCS motor subscore is 6.   Skin: Skin is warm and dry. No rash noted.   Psychiatric: He has a normal mood and affect.         ED Course   Procedures  Labs Reviewed   CBC W/ AUTO DIFFERENTIAL - Abnormal; Notable for the following components:       Result Value    WBC 17.56 (*)     Hemoglobin 13.8 (*)     MCHC 29.7 (*)     RDW 14.6 (*)     Platelets 431 (*)     Gran # (ANC) 13.5 (*)     Mono # 2.1 (*)     Gran% 79.1 (*)     Lymph% 10.9 (*)     Platelet Estimate Increased (*)     All other components within normal limits   COMPREHENSIVE METABOLIC PANEL - Abnormal; Notable for the following components:    Sodium 134 (*)     Potassium 6.1 (*)     CO2 6  (*)     Glucose 781 (*)     BUN, Bld 39 (*)     Creatinine 2.3 (*)     Total Protein 9.0 (*)     Alkaline Phosphatase 154 (*)     Anion Gap 28 (*)     eGFR if  36 (*)     eGFR if non  31 (*)     All other components within normal limits    Narrative:     GLUCOSE AND CO2 critical result(s) called and verbal readback   obtained from SHAMA NIXON. , 03/12/2019 12:31   BETA - HYDROXYBUTYRATE, SERUM - Abnormal; Notable for the following components:    Beta-Hydroxybutyrate 5.7 (*)     All other components within normal limits   TROPONIN I - Abnormal; Notable for the following components:    Troponin I 0.054 (*)     All other components within normal limits   B-TYPE NATRIURETIC PEPTIDE - Abnormal; Notable for the following components:     (*)     All other components within normal limits   LACTIC ACID, PLASMA - Abnormal; Notable for the following components:    Lactate (Lactic Acid) 4.3 (*)     All other components within normal limits    Narrative:        LACTIC ACID critical result(s) called and verbal readback obtained   from SHAMA NIXON RN, 03/12/2019 11:59   LIPASE - Abnormal; Notable for the following components:    Lipase 197 (*)     All other components within normal limits   ISTAT PROCEDURE - Abnormal; Notable for the following components:    POC PH 6.885 (*)     POC PCO2 28.6 (*)     POC HCO3 5.4 (*)     POC SATURATED O2 45 (*)     POC TCO2 6 (*)     All other components within normal limits   ISTAT PROCEDURE - Abnormal; Notable for the following components:    POC Glucose >700 (*)     POC BUN 45 (*)     POC Sodium 135 (*)     POC Potassium 6.3 (*)     POC TCO2 (MEASURED) 9 (*)     POC Anion Gap 26 (*)     All other components within normal limits   CULTURE, BLOOD   CULTURE, BLOOD   URINALYSIS, REFLEX TO URINE CULTURE   POCT GLUCOSE MONITORING CONTINUOUS   ISTAT CHEM8   POCT GLUCOSE MONITORING CONTINUOUS     EKG Readings: (Independently Interpreted)   Initial  Reading: No STEMI. Rhythm: Normal Sinus Rhythm. Heart Rate: 98.   Irregular baseline and motion artifact, no definite acute ischemic change     ECG Results          EKG 12-lead (In process)  Result time 03/12/19 13:44:53    In process by Interface, Lab In [facility] (03/12/19 13:44:53)                 Narrative:    Test Reason : R73.9,    Vent. Rate : 098 BPM     Atrial Rate : 098 BPM     P-R Int : 156 ms          QRS Dur : 100 ms      QT Int : 380 ms       P-R-T Axes : 084 064 107 degrees     QTc Int : 485 ms    Normal sinus rhythm  Cannot rule out Anterior infarct ,age undetermined  Abnormal ECG  When compared with ECG of 05-OCT-2018 23:02,  Significant changes have occurred    Referred By: [email or web address]   SELF           Confirmed By:                   In process by Interface, Lab In [facility] (03/12/19 12:01:19)                 Narrative:    Test Reason : R73.9,    Vent. Rate : 098 BPM     Atrial Rate : 098 BPM     P-R Int : 156 ms          QRS Dur : 100 ms      QT Int : 380 ms       P-R-T Axes : 084 064 107 degrees     QTc Int : 485 ms    Normal sinus rhythm  Cannot rule out Anterior infarct ,age undetermined  Abnormal ECG  When compared with ECG of 05-OCT-2018 23:02,  Significant changes have occurred    Referred By: [email or web address]   SELF           Confirmed By:                             Imaging Results          X-Ray Chest AP Portable (Final result)  Result time 03/12/19 11:42:35    Final result by Cj Madera MD (03/12/19 11:42:35)                 Impression:      No acute abnormality.      Electronically signed by: Cj Madera MD  Date:    03/12/2019  Time:    11:42             Narrative:    EXAMINATION:  XR CHEST AP PORTABLE    CLINICAL HISTORY:  hyperglycemia;.    TECHNIQUE:  Single frontal portable view of the chest was performed.    COMPARISON:  10/05/2018    FINDINGS:  Support devices: None    The lungs are clear, with normal appearance of pulmonary vasculature and no pleural effusion or pneumothorax.    The  cardiac silhouette is normal in size. The hilar and mediastinal contours are unremarkable.    Bones are intact.                              X-Rays:   Independently Interpreted Readings:   Chest X-Ray: No infiltrates.  No acute abnormalities.     Medical Decision Making:   History:   Old Medical Records: I decided to obtain old medical records.  Old Records Summarized: records from previous admission(s).       <> Summary of Records: Past admissions for sepsis, fever of unknown origin.  Differential Diagnosis:   DKA  Pancreatitis  Bacteremia  Electrolyte abnormality  Gastritis  ACS  Independently Interpreted Test(s):   I have ordered and independently interpreted X-rays - see prior notes.  I have ordered and independently interpreted EKG Reading(s) - see prior notes  Clinical Tests:   Lab Tests: Ordered and Reviewed  Radiological Study: Ordered and Reviewed  Medical Tests: Ordered and Reviewed  ED Management:  Patient distressed in agitated at time history and physical.  EKG without obvious STEMI.  He has dry mucous membranes.  He has generalized abdominal tenderness but does not have an acute surgical abdomen.  Point of care blood gas and i-STAT demonstrate acidosis, hyperglycemia, hyperkalemia, anion gap.  Patient started on IV hydration.  Lactic acidosis result elevated greater than 4.  Blood cultures drawn patient started broad-spectrum antibiotics.  Lipase elevated at 197.  The significantly elevated compared to prior.  Pancreatitis likely trigger for patient's DKA.    I spent a total of 120 minutes caring for and overseeing the critical care of this patient. Critical care time was spent treating or preventing major adverse outcome resulting from DKA.  Patient was specifically observed and treated with physical exam, ABG, IV hydration, insulin drip, IV antibiotics, bicarbonate infusion, followed by discussion with hospitalist and admission to the hospital.     This chart was completed using dictation software,  as a result there may be some typographical errors.             Scribe Attestation:   Scribe #1: I performed the above scribed service and the documentation accurately describes the services I performed. I attest to the accuracy of the note.    Attending Attestation:           Physician Attestation for Scribe:  Physician Attestation Statement for Scribe #1: I, Pennie Landry MD, reviewed documentation, as scribed by Dulce Victor in my presence, and it is both accurate and complete.                    Clinical Impression:       ICD-10-CM ICD-9-CM   1. Diabetic ketoacidosis without coma associated with other specified diabetes mellitus E13.10 250.12   2. Hyperglycemia R73.9 790.29         Disposition:   Disposition: Admitted  Condition: Serious                        Pennie Landry MD  03/12/19 1413

## 2019-03-12 NOTE — PROGRESS NOTES
[facility] Hospital Medicine  Progress Note    Patient Name: Cali Mabry  MRN: [id number]  Patient Class: IP- Inpatient   Admission Date: 3/12/2019  Length of Stay: 0 days  Attending Physician: Pennie Landry MD  Primary Care Provider: Jacob Montanez MD        Subjective:     Principal Problem:Diabetic ketoacidosis    HPI:  Mr. Mabry is a 54 yo M who presents to the hospital stating he has not felt well for the past 2 days with N/V. Patient is known insulin dependent diabetic. He is also a know IV drug user. His mother notes issues of compliance with his insulin. He presents in DKA with AG of 28 and blood sugar > 700.  Patient is somewhat altered and most of the history is from the patient's mother. The patient presents with multiple electrolyte disorders, hypothermic and in acute renal failure (normal renal function 2018). The patient was started on an insulin and Hc03 drip and will be going to the ICU.  Cause of DKA could be from non-compliance, infection or possibly acute pancreatitis.     Hospital Course:  Mr. Mabry is a 54 yo M who presents to the hospital stating he has not felt well for the past 2 days with N/V. Patient is known insulin dependent diabetic. He is also a know IV drug user. His mother notes issues of compliance with his insulin. He presents in DKA with AG of 28 and blood sugar > 700.  Patient is somewhat altered and most of the history is from the patient's mother. The patient presents with multiple electrolyte disorders, hypothermic and in acute renal failure (normal renal function 2018). Patient was started on an insulin drip and sent to the ICU.     Past Medical History:   Diagnosis Date    Diabetes mellitus     Hepatitis     HTN (hypertension)     Pancreatitis        Past Surgical History:   Procedure Laterality Date    CARDIAC SURGERY  1999    stent placed. ([facility])    ESOPHAGOGASTRODUODENOSCOPY (EGD) Left 4/4/2016    Performed by Huber  MD Migue [facility]    SHOULDER SURGERY  2013    right shoulder, spur removal.       Review of patient's allergies indicates:  No Known Allergies    No current facility-administered medications on file prior to encounter.      Current Outpatient Medications on File Prior to Encounter   Medication Sig    atorvastatin (LIPITOR) 40 MG tablet Take 40 mg by mouth once daily.    gabapentin (NEURONTIN) 300 MG capsule Take 1 capsule (300 mg total) by mouth 2 (two) times daily.    ibuprofen (ADVIL,MOTRIN) 600 MG tablet Take 1 tablet (600 mg total) by mouth every 6 (six) hours as needed for Pain.    insulin aspart (NOVOLOG) 100 unit/mL InPn pen Inject 10 Units into the skin 3 (three) times daily.    LANTUS 100 unit/mL injection Inject 15 Units into the skin 2 (two) times daily.    nicotine (NICODERM CQ) 21 mg/24 hr Place 1 patch onto the skin once daily.    omeprazole (PRILOSEC) 40 MG capsule Take 1 capsule (40 mg total) by mouth once daily.     Family History     Problem Relation (Age of Onset)    Asthma Mother        Tobacco Use    Smoking status: Current Every Day Smoker     Packs/day: 1.00     Years: 20.00     Pack years: 20.00     Types: Cigarettes     Start date: 4/3/1981    Smokeless tobacco: Never Used   Substance and Sexual Activity    Alcohol use: No     Alcohol/week: 0.0 oz    Drug use: Yes     Types: IV, Marijuana, Heroin     Comment: currently, used today    Sexual activity: Yes     Partners: Female     Review of Systems   Unable to perform ROS: Acuity of condition     Objective:     Vital Signs (Most Recent):  Temp: (!) 94.1 °F (34.5 °C) (03/12/19 1058)  Pulse: 97 (03/12/19 1130)  Resp: (!) 30 (03/12/19 1305)  BP: (!) 145/66 (03/12/19 1116)  SpO2: 100 % (03/12/19 1130) Vital Signs (24h Range):  Temp:  [94.1 °F (34.5 °C)] 94.1 °F (34.5 °C)  Pulse:  [] 97  Resp:  [28-30] 30  SpO2:  [99 %-100 %] 100 %  BP: (131-148)/(61-70) 145/66     Weight: 63.5 kg (140 lb)  Body mass index is 20.09  kg/m².    Physical Exam   Constitutional: He appears well-developed and well-nourished.   HENT:   Head: Normocephalic and atraumatic.   Cardiovascular: Normal rate and regular rhythm. Exam reveals no friction rub.   No murmur heard.  Pulmonary/Chest: Effort normal and breath sounds normal. No stridor. No respiratory distress. He has no wheezes. He has no rales.   Abdominal: Soft. There is tenderness. There is no rebound.   Neurological: He is alert.   Skin: Skin is warm and dry.   Nursing note and vitals reviewed.          Significant Labs:   BMP:   Recent Labs   Lab 03/12/19  1126   *   *   K 6.1*      CO2 6*   BUN 39*   CREATININE 2.3*   CALCIUM 9.3     CBC:   Recent Labs   Lab 03/12/19  1052 03/12/19  1126   WBC  --  17.56*   HGB  --  13.8*   HCT 45 46.4   PLT  --  431*       Significant Imaging:     X-ray chest normal.   Lipase 197    Assessment/Plan:      * Diabetic ketoacidosis    Very severe. And very ill patient.  Reasons could be infection from IV drug use, pancreatitis, and/or compliance issues. Insulin drip and Hc03 for now.  Will cover with Vanc and Zosyn given critical nature of patient.  Will check blood cultures. IV insulin, fluids. BMP every 4 hours.         Type 2 diabetes mellitus, uncontrolled    Will check an A1c. Insulin drip for now.  No known manifestations        IV drug user    With heroin. Will watch for withdrawal. Not sure of last use.          Hypothermia    From acute illness. Warming blanket        Hyperkalemia    Should correct with fluids and Hc03. Repeat BMP in 4 hours        Acute renal failure    From above and from N/V. IV fluids.  BMP every 4.  Patient had normal function in 2018. Suspect will all correct with IV fluids.          Tobacco abuse    Smoking cessation education when appropriate        Chronic relapsing pancreatitis    May be cause of patient's abdominal pain. Lipase just a little elevated- in setting of volume contraction- may be falsely high.   Consider CT abdomen. NPO        Chronic hepatitis C    No acute issues.        Anemia of chronic disease    No acute issues        Hyperlipidemia    Resume home meds when able.        Essential hypertension    Resume home meds when able.          VTE Risk Mitigation (From admission, onward)    None        Very ill patient.  Will check on again shortly.         Tyrese Singh MD  Department of Hospital Medicine   [facility]

## 2019-03-12 NOTE — SUBJECTIVE & OBJECTIVE
Past Medical History:   Diagnosis Date    Diabetes mellitus     Hepatitis     HTN (hypertension)     Pancreatitis        Past Surgical History:   Procedure Laterality Date    CARDIAC SURGERY  1999    stent placed. ([facility])    ESOPHAGOGASTRODUODENOSCOPY (EGD) Left 4/4/2016    Performed by [facility] at [facility] ENDO    SHOULDER SURGERY  2013    right shoulder, spur removal.       Review of patient's allergies indicates:  No Known Allergies    No current facility-administered medications on file prior to encounter.      Current Outpatient Medications on File Prior to Encounter   Medication Sig    atorvastatin (LIPITOR) 40 MG tablet Take 40 mg by mouth once daily.    gabapentin (NEURONTIN) 300 MG capsule Take 1 capsule (300 mg total) by mouth 2 (two) times daily.    ibuprofen (ADVIL,MOTRIN) 600 MG tablet Take 1 tablet (600 mg total) by mouth every 6 (six) hours as needed for Pain.    insulin aspart (NOVOLOG) 100 unit/mL InPn pen Inject 10 Units into the skin 3 (three) times daily.    LANTUS 100 unit/mL injection Inject 15 Units into the skin 2 (two) times daily.    nicotine (NICODERM CQ) 21 mg/24 hr Place 1 patch onto the skin once daily.    omeprazole (PRILOSEC) 40 MG capsule Take 1 capsule (40 mg total) by mouth once daily.     Family History     Problem Relation (Age of Onset)    Asthma Mother        Tobacco Use    Smoking status: Current Every Day Smoker     Packs/day: 1.00     Years: 20.00     Pack years: 20.00     Types: Cigarettes     Start date: 4/3/1981    Smokeless tobacco: Never Used   Substance and Sexual Activity    Alcohol use: No     Alcohol/week: 0.0 oz    Drug use: Yes     Types: IV, Marijuana, Heroin     Comment: currently, used today    Sexual activity: Yes     Partners: Female     Review of Systems   Unable to perform ROS: Acuity of condition     Objective:     Vital Signs (Most Recent):  Temp: (!) 94.1 °F (34.5 °C) (03/12/19 1058)  Pulse: 97 (03/12/19 1130)  Resp:  (!) 30 (03/12/19 1305)  BP: (!) 145/66 (03/12/19 1116)  SpO2: 100 % (03/12/19 1130) Vital Signs (24h Range):  Temp:  [94.1 °F (34.5 °C)] 94.1 °F (34.5 °C)  Pulse:  [] 97  Resp:  [28-30] 30  SpO2:  [99 %-100 %] 100 %  BP: (131-148)/(61-70) 145/66     Weight: 63.5 kg (140 lb)  Body mass index is 20.09 kg/m².    Physical Exam   Constitutional: He appears well-developed and well-nourished.   HENT:   Head: Normocephalic and atraumatic.   Cardiovascular: Normal rate and regular rhythm. Exam reveals no friction rub.   No murmur heard.  Pulmonary/Chest: Effort normal and breath sounds normal. No stridor. No respiratory distress. He has no wheezes. He has no rales.   Abdominal: Soft. There is tenderness. There is no rebound.   Neurological: He is alert.   Skin: Skin is warm and dry.   Nursing note and vitals reviewed.          Significant Labs:   BMP:   Recent Labs   Lab 03/12/19  1126   *   *   K 6.1*      CO2 6*   BUN 39*   CREATININE 2.3*   CALCIUM 9.3     CBC:   Recent Labs   Lab 03/12/19  1052 03/12/19  1126   WBC  --  17.56*   HGB  --  13.8*   HCT 45 46.4   PLT  --  431*       Significant Imaging:     X-ray chest normal.   Lipase 197

## 2019-03-12 NOTE — HOSPITAL COURSE
"Mr. Mabry is a 52 yo M who presents to the hospital stating he has not felt well for the past 2 days with N/V. Patient is known insulin dependent diabetic. He is also a know IV drug user. His mother notes issues of compliance with his insulin. He presents in DKA with AG of 28 and blood sugar > 700.  Patient is somewhat altered and most of the history is from the patient's mother. The patient presents with multiple electrolyte disorders, hypothermic and in acute renal failure (normal renal function 2018). Patient was started on an insulin drip and sent to the ICU. Patient clinically improved over 24 hours. Blood cultures were NG.  Vanc stopped.  Patient started on diet and home insulin regimen. The patient was transferred to the floor on 3/13.     3/14- pt had elevated lipase and abdominal pain. Held NPO and given IVF for pancreatitis. Toradol given.   3/15- advance to liquid diet and if stable dc tomorrow   3/16- advance to full liquids, blood sugars dropped to 30s, scaled back insulin, monitor   3/17- pt apparently may have taken narcotic brought in by "sister" last night. Pt with odd behavior this am. Lipase trending down, BS stable and ok for dc home. Counseled on diet diabetic/bland. Take insulin as prescribed and of course, get help with drug abuse. Pt not serious about quitting yet. Anticipate future admissions for non-compliance.   "

## 2019-03-12 NOTE — ASSESSMENT & PLAN NOTE
Very severe. And very ill patient.  Reasons could be infection from IV drug use, pancreatitis, and/or compliance issues. Insulin drip and Hc03 for now.  Will cover with Vanc and Zosyn given critical nature of patient.  Will check blood cultures. IV insulin, fluids. BMP every 4 hours.

## 2019-03-12 NOTE — ASSESSMENT & PLAN NOTE
From above and from N/V. IV fluids.  BMP every 4.  Patient had normal function in 2018. Suspect will all correct with IV fluids.

## 2019-03-12 NOTE — ASSESSMENT & PLAN NOTE
May be cause of patient's abdominal pain. Lipase just a little elevated- in setting of volume contraction- may be falsely high.  Consider CT abdomen. NPO

## 2019-03-12 NOTE — HPI
Mr. Mabry is a 54 yo M who presents to the hospital stating he has not felt well for the past 2 days with N/V. Patient is known insulin dependent diabetic. He is also a know IV drug user. His mother notes issues of compliance with his insulin. He presents in DKA with AG of 28 and blood sugar > 700.  Patient is somewhat altered and most of the history is from the patient's mother. The patient presents with multiple electrolyte disorders, hypothermic and in acute renal failure (normal renal function 2018). The patient was started on an insulin and Hc03 drip and will be going to the ICU.  Cause of DKA could be from non-compliance, infection or possibly acute pancreatitis.

## 2019-03-12 NOTE — ED TRIAGE NOTES
Pt arrives to er via ems on stretcher from home aaox1. Ems report pt c/o abd pain and critical jimmie blood sugar. Report that pt with hx of DM, Hepatitis, HTN, and Pancreatitis. Ems states bg over 500.     `

## 2019-03-13 LAB
ANION GAP SERPL CALC-SCNC: 5 MMOL/L
ANION GAP SERPL CALC-SCNC: 5 MMOL/L
ANION GAP SERPL CALC-SCNC: 7 MMOL/L
ANION GAP SERPL CALC-SCNC: 9 MMOL/L
ANISOCYTOSIS BLD QL SMEAR: SLIGHT
BASOPHILS # BLD AUTO: 0.06 K/UL
BASOPHILS NFR BLD: 0.7 %
BUN SERPL-MCNC: 22 MG/DL
BUN SERPL-MCNC: 24 MG/DL
BUN SERPL-MCNC: 25 MG/DL
BUN SERPL-MCNC: 26 MG/DL
CALCIUM SERPL-MCNC: 7.9 MG/DL
CALCIUM SERPL-MCNC: 8.1 MG/DL
CHLORIDE SERPL-SCNC: 105 MMOL/L
CHLORIDE SERPL-SCNC: 108 MMOL/L
CHLORIDE SERPL-SCNC: 110 MMOL/L
CHLORIDE SERPL-SCNC: 110 MMOL/L
CO2 SERPL-SCNC: 23 MMOL/L
CO2 SERPL-SCNC: 26 MMOL/L
CO2 SERPL-SCNC: 27 MMOL/L
CO2 SERPL-SCNC: 29 MMOL/L
CREAT SERPL-MCNC: 0.9 MG/DL
CREAT SERPL-MCNC: 1 MG/DL
CREAT SERPL-MCNC: 1.1 MG/DL
CREAT SERPL-MCNC: 1.2 MG/DL
DIFFERENTIAL METHOD: ABNORMAL
EOSINOPHIL # BLD AUTO: 0 K/UL
EOSINOPHIL NFR BLD: 0.1 %
ERYTHROCYTE [DISTWIDTH] IN BLOOD BY AUTOMATED COUNT: 13.9 %
EST. GFR  (AFRICAN AMERICAN): >60 ML/MIN/1.73 M^2
EST. GFR  (NON AFRICAN AMERICAN): >60 ML/MIN/1.73 M^2
ESTIMATED AVG GLUCOSE: 174 MG/DL
GLUCOSE SERPL-MCNC: 102 MG/DL
GLUCOSE SERPL-MCNC: 108 MG/DL
GLUCOSE SERPL-MCNC: 115 MG/DL
GLUCOSE SERPL-MCNC: 179 MG/DL
HBA1C MFR BLD HPLC: 7.7 %
HCT VFR BLD AUTO: 31.8 %
HGB BLD-MCNC: 10.7 G/DL
LYMPHOCYTES # BLD AUTO: 1.1 K/UL
LYMPHOCYTES NFR BLD: 12.3 %
MCH RBC QN AUTO: 26.6 PG
MCHC RBC AUTO-ENTMCNC: 33.6 G/DL
MCV RBC AUTO: 79 FL
MONOCYTES # BLD AUTO: 1 K/UL
MONOCYTES NFR BLD: 11.2 %
NEUTROPHILS # BLD AUTO: 6.4 K/UL
NEUTROPHILS NFR BLD: 79.2 %
PLATELET # BLD AUTO: 234 K/UL
PLATELET BLD QL SMEAR: ABNORMAL
PMV BLD AUTO: 9.4 FL
POCT GLUCOSE: 105 MG/DL (ref 70–110)
POCT GLUCOSE: 113 MG/DL (ref 70–110)
POCT GLUCOSE: 117 MG/DL (ref 70–110)
POCT GLUCOSE: 119 MG/DL (ref 70–110)
POCT GLUCOSE: 120 MG/DL (ref 70–110)
POCT GLUCOSE: 131 MG/DL (ref 70–110)
POCT GLUCOSE: 134 MG/DL (ref 70–110)
POCT GLUCOSE: 147 MG/DL (ref 70–110)
POCT GLUCOSE: 201 MG/DL (ref 70–110)
POCT GLUCOSE: 329 MG/DL (ref 70–110)
POCT GLUCOSE: 385 MG/DL (ref 70–110)
POCT GLUCOSE: 99 MG/DL (ref 70–110)
POCT GLUCOSE: >500 MG/DL (ref 70–110)
POLYCHROMASIA BLD QL SMEAR: ABNORMAL
POTASSIUM SERPL-SCNC: 3.3 MMOL/L
POTASSIUM SERPL-SCNC: 3.3 MMOL/L
POTASSIUM SERPL-SCNC: 3.4 MMOL/L
POTASSIUM SERPL-SCNC: 3.4 MMOL/L
RBC # BLD AUTO: 4.02 M/UL
SODIUM SERPL-SCNC: 140 MMOL/L
SODIUM SERPL-SCNC: 140 MMOL/L
SODIUM SERPL-SCNC: 142 MMOL/L
SODIUM SERPL-SCNC: 142 MMOL/L
TROPONIN I SERPL DL<=0.01 NG/ML-MCNC: 1.73 NG/ML
WBC # BLD AUTO: 8.51 K/UL

## 2019-03-13 PROCEDURE — 99233 SBSQ HOSP IP/OBS HIGH 50: CPT | Mod: ,,, | Performed by: INTERNAL MEDICINE

## 2019-03-13 PROCEDURE — 84484 ASSAY OF TROPONIN QUANT: CPT

## 2019-03-13 PROCEDURE — S5010 5% DEXTROSE AND 0.45% SALINE: HCPCS | Performed by: INTERNAL MEDICINE

## 2019-03-13 PROCEDURE — 80048 BASIC METABOLIC PNL TOTAL CA: CPT | Mod: 91

## 2019-03-13 PROCEDURE — S5571 INSULIN DISPOS PEN 3 ML: HCPCS | Performed by: INTERNAL MEDICINE

## 2019-03-13 PROCEDURE — 11000001 HC ACUTE MED/SURG PRIVATE ROOM

## 2019-03-13 PROCEDURE — 99233 PR SUBSEQUENT HOSPITAL CARE,LEVL III: ICD-10-PCS | Mod: ,,, | Performed by: INTERNAL MEDICINE

## 2019-03-13 PROCEDURE — 25000003 PHARM REV CODE 250: Performed by: INTERNAL MEDICINE

## 2019-03-13 PROCEDURE — 94761 N-INVAS EAR/PLS OXIMETRY MLT: CPT

## 2019-03-13 PROCEDURE — 85025 COMPLETE CBC W/AUTO DIFF WBC: CPT

## 2019-03-13 PROCEDURE — 63600175 PHARM REV CODE 636 W HCPCS: Performed by: INTERNAL MEDICINE

## 2019-03-13 PROCEDURE — 36415 COLL VENOUS BLD VENIPUNCTURE: CPT

## 2019-03-13 PROCEDURE — 83036 HEMOGLOBIN GLYCOSYLATED A1C: CPT

## 2019-03-13 RX ORDER — INSULIN ASPART 100 [IU]/ML
1-10 INJECTION, SOLUTION INTRAVENOUS; SUBCUTANEOUS
Status: DISCONTINUED | OUTPATIENT
Start: 2019-03-13 | End: 2019-03-17 | Stop reason: HOSPADM

## 2019-03-13 RX ORDER — IBUPROFEN 200 MG
16 TABLET ORAL
Status: DISCONTINUED | OUTPATIENT
Start: 2019-03-13 | End: 2019-03-17 | Stop reason: HOSPADM

## 2019-03-13 RX ORDER — GLUCAGON 1 MG
1 KIT INJECTION
Status: DISCONTINUED | OUTPATIENT
Start: 2019-03-13 | End: 2019-03-17 | Stop reason: HOSPADM

## 2019-03-13 RX ORDER — KETOROLAC TROMETHAMINE 30 MG/ML
30 INJECTION, SOLUTION INTRAMUSCULAR; INTRAVENOUS ONCE
Status: COMPLETED | OUTPATIENT
Start: 2019-03-13 | End: 2019-03-13

## 2019-03-13 RX ORDER — ENOXAPARIN SODIUM 100 MG/ML
40 INJECTION SUBCUTANEOUS EVERY 24 HOURS
Status: DISCONTINUED | OUTPATIENT
Start: 2019-03-13 | End: 2019-03-17 | Stop reason: HOSPADM

## 2019-03-13 RX ORDER — INSULIN ASPART 100 [IU]/ML
10 INJECTION, SOLUTION INTRAVENOUS; SUBCUTANEOUS 3 TIMES DAILY
Status: DISCONTINUED | OUTPATIENT
Start: 2019-03-13 | End: 2019-03-16

## 2019-03-13 RX ORDER — ACETAMINOPHEN 500 MG
500 TABLET ORAL EVERY 6 HOURS PRN
Status: DISCONTINUED | OUTPATIENT
Start: 2019-03-13 | End: 2019-03-17 | Stop reason: HOSPADM

## 2019-03-13 RX ORDER — SODIUM CHLORIDE 9 MG/ML
INJECTION, SOLUTION INTRAVENOUS CONTINUOUS
Status: DISCONTINUED | OUTPATIENT
Start: 2019-03-13 | End: 2019-03-14

## 2019-03-13 RX ORDER — IBUPROFEN 200 MG
24 TABLET ORAL
Status: DISCONTINUED | OUTPATIENT
Start: 2019-03-13 | End: 2019-03-13

## 2019-03-13 RX ADMIN — INSULIN ASPART 4 UNITS: 100 INJECTION, SOLUTION INTRAVENOUS; SUBCUTANEOUS at 10:03

## 2019-03-13 RX ADMIN — INSULIN ASPART 10 UNITS: 100 INJECTION, SOLUTION INTRAVENOUS; SUBCUTANEOUS at 10:03

## 2019-03-13 RX ADMIN — INSULIN ASPART 10 UNITS: 100 INJECTION, SOLUTION INTRAVENOUS; SUBCUTANEOUS at 04:03

## 2019-03-13 RX ADMIN — SODIUM BICARBONATE: 84 INJECTION, SOLUTION INTRAVENOUS at 12:03

## 2019-03-13 RX ADMIN — ENOXAPARIN SODIUM 40 MG: 100 INJECTION SUBCUTANEOUS at 04:03

## 2019-03-13 RX ADMIN — INSULIN DETEMIR 15 UNITS: 100 INJECTION, SOLUTION SUBCUTANEOUS at 10:03

## 2019-03-13 RX ADMIN — SODIUM BICARBONATE: 84 INJECTION, SOLUTION INTRAVENOUS at 06:03

## 2019-03-13 RX ADMIN — KETOROLAC TROMETHAMINE 30 MG: 30 INJECTION, SOLUTION INTRAMUSCULAR; INTRAVENOUS at 04:03

## 2019-03-13 RX ADMIN — SODIUM CHLORIDE: 0.9 INJECTION, SOLUTION INTRAVENOUS at 04:03

## 2019-03-13 NOTE — CARE UPDATE
Transfer Note:     Mr. Mabry is a 52 yo M who presents to the hospital stating he has not felt well for the past 2 days with N/V. Patient is known insulin dependent diabetic. He is also a know IV drug user. His mother notes issues of compliance with his insulin. He presents in DKA with AG of 28 and blood sugar > 700.  Patient is somewhat altered and most of the history is from the patient's mother. The patient presents with multiple electrolyte disorders, hypothermic and in acute renal failure (normal renal function 2018). Patient was started on an insulin drip and sent to the ICU. Patient clinically improved over 24 hours. Blood cultures were NG.  Vanc stopped.  Patient started on diet and home insulin regimen. The patient was transferred to the floor on 3/13. The patient was close to death on admission of note. He had a C02 of 6.  He evidently has been in multiple rehabs for drug abuse.  Will consult  for out patient drug resources.  PT/OT eval.     On the floor, patient began to have abdominal pain after eating. He was made NPO. His Lipase was somewhat elevated on admission and patient has a history of pancreatitis. CT abdomen was obtained.     Further, patient was found to have had a bump in his troponin. Likely from strain on admission.  Asymptomatic now.  Will consult cards for recs.

## 2019-03-13 NOTE — NURSING
Patient transported to CT, no apparent distress or SOB.  Pain medication given prior to transport.  Tele monitor notified.

## 2019-03-13 NOTE — ASSESSMENT & PLAN NOTE
Very severe. And very ill patient.  Reasons could be infection from IV drug use, pancreatitis, and/or compliance issues. Insulin drip and Hc03 for now.  Will cover with Vanc and Zojosé miguel given critical nature of patient.  Will check blood cultures. IV insulin, fluids. BMP every 4 hours.      Resolved. D/C insulin drip. Start on home insulin therapy.  A1c

## 2019-03-13 NOTE — PROGRESS NOTES
[facility]  Pulmonology  Progress Note    Patient Name: Cali Mabry  MRN: [id number]  Admission Date: 3/12/2019  Hospital Length of Stay: 1 days  Code Status: Prior  Attending Provider: Tyrese Dickinson MD  Primary Care Provider: Jacob Montanez MD   Principal Problem: Diabetic ketoacidosis    Subjective:     No acute issues. BG normalized.   Review of Systems   Unable to perform ROS: Acuity of condition     Objective:     Vital Signs (Most Recent):  Temp: 99.5 °F (37.5 °C) (03/13/19 0715)  Pulse: 75 (03/13/19 0800)  Resp: (!) 22 (03/13/19 0800)  BP: (!) 104/53 (03/13/19 0800)  SpO2: 99 % (03/13/19 0810) Vital Signs (24h Range):  Temp:  [94.1 °F (34.5 °C)-99.5 °F (37.5 °C)] 99.5 °F (37.5 °C)  Pulse:  [] 75  Resp:  [22-39] 22  SpO2:  [98 %-100 %] 99 %  BP: ()/(50-79) 104/53     Weight: 53.5 kg (117 lb 15.1 oz)  Body mass index is 18.47 kg/m².    Physical Exam   Constitutional: He appears well-developed and well-nourished.   HENT:   Head: Normocephalic and atraumatic.   Cardiovascular: Normal rate and regular rhythm. Exam reveals no friction rub.   No murmur heard.  Pulmonary/Chest: Effort normal and breath sounds normal. No stridor. No respiratory distress. He has no wheezes. He has no rales.   Abdominal: Soft. There is tenderness. There is no rebound.   Neurological: He is alert.   Skin: Skin is warm and dry.   Nursing note and vitals reviewed.          Significant Labs:   BMP:   Recent Labs   Lab 03/13/19  0749   *      K 3.3*      CO2 29   BUN 24*   CREATININE 1.0   CALCIUM 8.1*     CBC:   Recent Labs   Lab 03/12/19  1052 03/12/19  1126   WBC  --  17.56*   HGB  --  13.8*   HCT 45 46.4   PLT  --  431*       Significant Imaging:     X-ray chest normal.   Lipase 197    Assessment/Plan:     * Diabetic ketoacidosis    Improved. Gap closed. Stop bicarb gtt. Advance diet and switch to basal prandial insulin.      Acute renal failure    Resolved.     Heroin  abuse    Watch for withdrawal.     Intravenous drug abuse    Heroin abuse. Needs addiction counseling.      Acute on chronic pancreatitis    Advance diet.        Patient stable for stepdown. Will sign off. Please call with questions.      Brinda Rene MD  Pulmonology  [facility]

## 2019-03-13 NOTE — ASSESSMENT & PLAN NOTE
Patient with severe electrolyte derangements. Close monitoring with repeating as needed.  Insulin gtt.   IVF.

## 2019-03-13 NOTE — CONSULTS
[facility]  Pulmonology  Consult Note    Patient Name: Cali Mabry  MRN: [id number]  Admission Date: 3/12/2019  Hospital Length of Stay: 0 days  Code Status: Prior  Attending Physician: Tyrese Dickinson MD  Primary Care Provider: Jacob Montanez MD   Principal Problem: Diabetic ketoacidosis    Inpatient consult to Pulmonology  Consult performed by: Brinda Rene MD  Consult ordered by: Tyrese Dickinson MD        Subjective:     HPI:   52 yo M who presents to the hospital stating he has not felt well for the past 2 days with N/V. Patient is known insulin dependent diabetic. He is also a know IV drug user. His mother notes issues of compliance with his insulin. He presents in DKA with AG of 28 and blood sugar > 700.  Patient is somewhat altered and most of the history is from the patient's mother. The patient presents with multiple electrolyte disorders, hypothermic and in acute renal failure (normal renal function 2018). The patient was started on an insulin and Hc03 drip and will be going to the ICU.  Cause of DKA could be from non-compliance, infection or possibly acute pancreatitis.     Pulmonary/CC consulted for ICU co management. Patient is awake and alert but only answers yes/no questions.   Currently on insulin gtt, IVF and bicarb gtt.     Past Medical History:   Diagnosis Date    Diabetes mellitus     Hepatitis     HTN (hypertension)     Pancreatitis        Past Surgical History:   Procedure Laterality Date    CARDIAC SURGERY  1999    stent placed. ([facility])    ESOPHAGOGASTRODUODENOSCOPY (EGD) Left 4/4/2016    Performed by Huber Camarena [facility] at [facility]    SHOULDER SURGERY  2013    right shoulder, spur removal.       Review of patient's allergies indicates:  No Known Allergies    No current facility-administered medications on file prior to encounter.      Current Outpatient Medications on File Prior to Encounter   Medication Sig    atorvastatin  (LIPITOR) 40 MG tablet Take 40 mg by mouth once daily.    gabapentin (NEURONTIN) 300 MG capsule Take 1 capsule (300 mg total) by mouth 2 (two) times daily.    ibuprofen (ADVIL,MOTRIN) 600 MG tablet Take 1 tablet (600 mg total) by mouth every 6 (six) hours as needed for Pain.    insulin aspart (NOVOLOG) 100 unit/mL InPn pen Inject 10 Units into the skin 3 (three) times daily.    LANTUS 100 unit/mL injection Inject 15 Units into the skin 2 (two) times daily.    nicotine (NICODERM CQ) 21 mg/24 hr Place 1 patch onto the skin once daily.    omeprazole (PRILOSEC) 40 MG capsule Take 1 capsule (40 mg total) by mouth once daily.     Family History     Problem Relation (Age of Onset)    Asthma Mother        Tobacco Use    Smoking status: Current Every Day Smoker     Packs/day: 1.00     Years: 20.00     Pack years: 20.00     Types: Cigarettes     Start date: 4/3/1981    Smokeless tobacco: Never Used   Substance and Sexual Activity    Alcohol use: No     Alcohol/week: 0.0 oz    Drug use: Yes     Types: IV, Marijuana, Heroin     Comment: currently, used today, herion    Sexual activity: Yes     Partners: Female     Review of Systems   Unable to perform ROS: Acuity of condition     Objective:     Vital Signs (Most Recent):  Temp: 98.3 °F (36.8 °C) (03/12/19 1600)  Pulse: 85 (03/12/19 1600)  Resp: (!) 23 (03/12/19 1600)  BP: (!) 117/56 (03/12/19 1600)  SpO2: 100 % (03/12/19 1600) Vital Signs (24h Range):  Temp:  [94.1 °F (34.5 °C)-98.3 °F (36.8 °C)] 98.3 °F (36.8 °C)  Pulse:  [] 85  Resp:  [23-39] 23  SpO2:  [98 %-100 %] 100 %  BP: (102-192)/(52-79) 117/56     Weight: 53.5 kg (117 lb 15.1 oz)  Body mass index is 18.47 kg/m².    Physical Exam   Constitutional: He appears well-developed and well-nourished.   HENT:   Head: Normocephalic and atraumatic.   Cardiovascular: Normal rate and regular rhythm. Exam reveals no friction rub.   No murmur heard.  Pulmonary/Chest: Effort normal and breath sounds normal. No  stridor. No respiratory distress. He has no wheezes. He has no rales.   Abdominal: Soft. There is tenderness. There is no rebound.   Neurological: He is alert.   Skin: Skin is warm and dry.   Nursing note and vitals reviewed.          Significant Labs:   BMP:   Recent Labs   Lab 03/12/19  1710   *   *   K 4.0      CO2 9*   BUN 33*   CREATININE 1.7*   CALCIUM 8.1*     CBC:   Recent Labs   Lab 03/12/19  1052 03/12/19  1126   WBC  --  17.56*   HGB  --  13.8*   HCT 45 46.4   PLT  --  431*       Significant Imaging:     X-ray chest normal.   Lipase 197    Assessment/Plan:     * Diabetic ketoacidosis    Patient with severe electrolyte derangements. Close monitoring with repeating as needed.  Insulin gtt.   IVF.     Acute renal failure    Likely secondary to dehydration. IVF. Monitor K and avoid nephrotoxic agents.     Heroin abuse    Watch for withdrawal     Intravenous drug abuse    Heroin abuse     Acute on chronic pancreatitis    Lipase elevated. IVF. NPO       Critical Care time: 35 minutes.    Critical Care time for the evaluation and treatment for severe organ dysfunction, review of pertinent labs and imaging studies discussions with primary team/consulting services and discussions with patient.      Continue ICU care.         Thank you for your consult. I will follow-up with patient. Please contact us if you have any additional questions.     Brinda Rene MD  Pulmonology  [facility]

## 2019-03-13 NOTE — NURSING
Pt has numerous areas on both arms and legs with varying degrees of scabbing noted. No open wounds noted.

## 2019-03-13 NOTE — PLAN OF CARE
"   03/13/19 1040   Discharge Assessment   Assessment Type Discharge Planning Assessment   Assessment information obtained from? Patient   Prior to hospitilization cognitive status: Alert/Oriented   Prior to hospitalization functional status: Independent   Current cognitive status: Alert/Oriented   Current Functional Status: Independent   Facility Arrived From: home   Lives With parent(s)   Able to Return to Prior Arrangements yes   Is patient able to care for self after discharge? Unable to determine at this time (comments)   Patient's perception of discharge disposition home or selfcare   Readmission Within the Last 30 Days no previous admission in last 30 days   Patient currently being followed by outpatient case management? No   Patient currently receives any other outside agency services? No   Do you have any problems affording any of your prescribed medications? No   Is the patient taking medications as prescribed? yes   Does the patient have transportation home? Yes   Transportation Anticipated family or friend will provide   Dialysis Name and Scheduled days N/A   Does the patient receive services at the [facility]madin Clinic? No   Discharge Plan A Home with family   Discharge Plan B Home with family   DME Needed Upon Discharge  glucometer   Patient/Family in Agreement with Plan yes         LINN met with pt and pt's family at bedside. SW explained her role in Care Management. Pt voiced understanding. SW inquired about HELP AT HOME. Pt stated that he will have Marilou  at home to help for support. SW voiced understanding. SW inquired about responsibilities when it comes to  MANAGING HER/HIS HEALTH at home and what it entails. Pt inquired about details. SW informed pt of RESPONSIBILITIES of:    1. Follow up appointments  2. Getting Prescriptions filled  3. Taking medications as prescribed.     Pt voiced understanding.  LINN explained "My Health Packet" blue folder and the pink and green tabs that are on the folder as well. " Discharge Brochure given to pt with Care Team information. Pt voiced understanding.     Pt[place]   [facility] [place] [address] [address]  Phone: [phone] Fax: [phone]      Pt's preference for appointments:morning       Lives with mom Marilou [phone]

## 2019-03-13 NOTE — HPI
52 yo M who presents to the hospital stating he has not felt well for the past 2 days with N/V. Patient is known insulin dependent diabetic. He is also a know IV drug user. His mother notes issues of compliance with his insulin. He presents in DKA with AG of 28 and blood sugar > 700.  Patient is somewhat altered and most of the history is from the patient's mother. The patient presents with multiple electrolyte disorders, hypothermic and in acute renal failure (normal renal function 2018). The patient was started on an insulin and Hc03 drip and will be going to the ICU.  Cause of DKA could be from non-compliance, infection or possibly acute pancreatitis.     Pulmonary/CC consulted for ICU co management. Patient is awake and alert but only answers yes/no questions.   Currently on insulin gtt, IVF and bicarb gtt.

## 2019-03-13 NOTE — NURSING
"More alert and talking. Given mouth swabs. "I want water not these things". Attempted to explain to pt why he can have water at this time. Turned head and ignored nurse.  "

## 2019-03-13 NOTE — SUBJECTIVE & OBJECTIVE
Interval History: No new issues      Review of Systems   Constitutional: Negative for activity change.   HENT: Negative for congestion.    Respiratory: Negative for chest tightness and shortness of breath.    Cardiovascular: Negative for chest pain.   Gastrointestinal: Negative for abdominal pain.   Genitourinary: Negative for difficulty urinating.   Musculoskeletal: Negative for arthralgias.     Objective:     Vital Signs (Most Recent):  Temp: 99.5 °F (37.5 °C) (03/13/19 0715)  Pulse: 77 (03/13/19 1000)  Resp: 12 (03/13/19 1000)  BP: (!) 122/58 (03/13/19 1000)  SpO2: 100 % (03/13/19 1000) Vital Signs (24h Range):  Temp:  [94.1 °F (34.5 °C)-99.5 °F (37.5 °C)] 99.5 °F (37.5 °C)  Pulse:  [] 77  Resp:  [12-39] 12  SpO2:  [98 %-100 %] 100 %  BP: ()/(50-79) 122/58     Weight: 53.5 kg (117 lb 15.1 oz)  Body mass index is 18.47 kg/m².    Intake/Output Summary (Last 24 hours) at 3/13/2019 1039  Last data filed at 3/13/2019 0600  Gross per 24 hour   Intake 4041.68 ml   Output 3450 ml   Net 591.68 ml      Physical Exam   Constitutional: He is oriented to person, place, and time. He appears well-developed and well-nourished.   HENT:   Head: Normocephalic and atraumatic.   Cardiovascular: Normal rate and regular rhythm.   Pulmonary/Chest: Effort normal.   Neurological: He is alert and oriented to person, place, and time.   Psychiatric: He has a normal mood and affect. His behavior is normal.   Vitals reviewed.      Significant Labs:   BMP:   Recent Labs   Lab 03/13/19  0749   *      K 3.3*      CO2 29   BUN 24*   CREATININE 1.0   CALCIUM 8.1*     CBC:   Recent Labs   Lab 03/12/19  1052 03/12/19  1126   WBC  --  17.56*   HGB  --  13.8*   HCT 45 46.4   PLT  --  431*       Significant Imaging:

## 2019-03-13 NOTE — PROGRESS NOTES
[facility] Hospital Medicine  Progress Note    Patient Name: Cali Mabry  MRN: [id number]  Patient Class: IP- Inpatient   Admission Date: 3/12/2019  Length of Stay: 1 days  Attending Physician: Tyrese Dickinson MD  Primary Care Provider: Jacob Montanez MD        Subjective:     Principal Problem:Diabetic ketoacidosis    HPI:  Mr. Mabry is a 54 yo M who presents to the hospital stating he has not felt well for the past 2 days with N/V. Patient is known insulin dependent diabetic. He is also a know IV drug user. His mother notes issues of compliance with his insulin. He presents in DKA with AG of 28 and blood sugar > 700.  Patient is somewhat altered and most of the history is from the patient's mother. The patient presents with multiple electrolyte disorders, hypothermic and in acute renal failure (normal renal function 2018). The patient was started on an insulin and Hc03 drip and will be going to the ICU.  Cause of DKA could be from non-compliance, infection or possibly acute pancreatitis.     Hospital Course:  Mr. Mabry is a 54 yo M who presents to the hospital stating he has not felt well for the past 2 days with N/V. Patient is known insulin dependent diabetic. He is also a know IV drug user. His mother notes issues of compliance with his insulin. He presents in DKA with AG of 28 and blood sugar > 700.  Patient is somewhat altered and most of the history is from the patient's mother. The patient presents with multiple electrolyte disorders, hypothermic and in acute renal failure (normal renal function 2018). Patient was started on an insulin drip and sent to the ICU. Patient clinically improved over 24 hours. Blood cultures were NG.  Vanc stopped.  Patient started on diet and home insulin regimen. The patient was transferred to the floor on 3/13.     Interval History: No new issues      Review of Systems   Constitutional: Negative for activity change.   HENT: Negative for  congestion.    Respiratory: Negative for chest tightness and shortness of breath.    Cardiovascular: Negative for chest pain.   Gastrointestinal: Negative for abdominal pain.   Genitourinary: Negative for difficulty urinating.   Musculoskeletal: Negative for arthralgias.     Objective:     Vital Signs (Most Recent):  Temp: 99.5 °F (37.5 °C) (03/13/19 0715)  Pulse: 77 (03/13/19 1000)  Resp: 12 (03/13/19 1000)  BP: (!) 122/58 (03/13/19 1000)  SpO2: 100 % (03/13/19 1000) Vital Signs (24h Range):  Temp:  [94.1 °F (34.5 °C)-99.5 °F (37.5 °C)] 99.5 °F (37.5 °C)  Pulse:  [] 77  Resp:  [12-39] 12  SpO2:  [98 %-100 %] 100 %  BP: ()/(50-79) 122/58     Weight: 53.5 kg (117 lb 15.1 oz)  Body mass index is 18.47 kg/m².    Intake/Output Summary (Last 24 hours) at 3/13/2019 1039  Last data filed at 3/13/2019 0600  Gross per 24 hour   Intake 4041.68 ml   Output 3450 ml   Net 591.68 ml      Physical Exam   Constitutional: He is oriented to person, place, and time. He appears well-developed and well-nourished.   HENT:   Head: Normocephalic and atraumatic.   Cardiovascular: Normal rate and regular rhythm.   Pulmonary/Chest: Effort normal.   Neurological: He is alert and oriented to person, place, and time.   Psychiatric: He has a normal mood and affect. His behavior is normal.   Vitals reviewed.      Significant Labs:   BMP:   Recent Labs   Lab 03/13/19  0749   *      K 3.3*      CO2 29   BUN 24*   CREATININE 1.0   CALCIUM 8.1*     CBC:   Recent Labs   Lab 03/12/19  1052 03/12/19  1126   WBC  --  17.56*   HGB  --  13.8*   HCT 45 46.4   PLT  --  431*       Significant Imaging:     Assessment/Plan:      * Diabetic ketoacidosis    Very severe. And very ill patient.  Reasons could be infection from IV drug use, pancreatitis, and/or compliance issues. Insulin drip and Hc03 for now.  Will cover with Vanc and Zosyn given critical nature of patient.  Will check blood cultures. IV insulin, fluids. BMP every 4  hours.      Resolved. D/C insulin drip. Start on home insulin therapy.  A1c        Type 2 diabetes mellitus, uncontrolled    Will check an A1c. Insulin drip for now.  No known manifestations        IV drug user    With heroin. Will watch for withdrawal. Not sure of last use.          Hypothermia    From acute illness. Warming blanket     Resolved.        Hyperkalemia    Should correct with fluids and Hc03. Repeat BMP in 4 hours     Resolved.        Acute renal failure    From above and from N/V. IV fluids.  BMP every 4.  Patient had normal function in 2018. Suspect will all correct with IV fluids.     Resolved.          Heroin abuse    Has been in Rehab in the past. Will consult  for out patient drug rehab resources.          Tobacco abuse    Smoking cessation education when appropriate        Chronic relapsing pancreatitis    May be cause of patient's abdominal pain. Lipase just a little elevated- in setting of volume contraction- may be falsely high.  Consider CT abdomen. NPO        Chronic hepatitis C    No acute issues.        Anemia of chronic disease    No acute issues        Intravenous drug abuse    Blood cultures are NG. Will d/c Vanc.        Hyperlipidemia    Resume home meds when able.        Acute on chronic pancreatitis    Possible.  Abdominal pain resolved mainly.  Start on diet.   His mother denied ETOH use          Essential hypertension    Resume home meds when able.          VTE Risk Mitigation (From admission, onward)        Ordered     IP VTE LOW RISK PATIENT  Once      03/12/19 1614     Place sequential compression device  Until discontinued      03/12/19 1614          Critical care time spent on the evaluation and treatment of severe organ dysfunction, review of pertinent labs and imaging studies, discussions with consulting providers and discussions with patient/family: 50 minutes.    Will transfer to floor.     Tyrese Singh MD  Department of Hospital Medicine   [facility]

## 2019-03-13 NOTE — NURSING
Patient transported to unit from ICU.  Patient in no apparent distress, no SOB, complaining of bilateral flank and abdominal pain. Patient asking for pain medication, no PRN medication available, patient aware. Patient states he is hungry, did not eat lunch in ICU d/t not feeling well.  No sandwiches available on floor, dietary called and was informed sandwiches would be stocked this evening.  Patient given crackers and jello.      Tele monitor box #6099 in place, tele tech notified.

## 2019-03-13 NOTE — SUBJECTIVE & OBJECTIVE
No acute issues. BG normalized.   Review of Systems   Unable to perform ROS: Acuity of condition     Objective:     Vital Signs (Most Recent):  Temp: 99.5 °F (37.5 °C) (03/13/19 0715)  Pulse: 75 (03/13/19 0800)  Resp: (!) 22 (03/13/19 0800)  BP: (!) 104/53 (03/13/19 0800)  SpO2: 99 % (03/13/19 0810) Vital Signs (24h Range):  Temp:  [94.1 °F (34.5 °C)-99.5 °F (37.5 °C)] 99.5 °F (37.5 °C)  Pulse:  [] 75  Resp:  [22-39] 22  SpO2:  [98 %-100 %] 99 %  BP: ()/(50-79) 104/53     Weight: 53.5 kg (117 lb 15.1 oz)  Body mass index is 18.47 kg/m².    Physical Exam   Constitutional: He appears well-developed and well-nourished.   HENT:   Head: Normocephalic and atraumatic.   Cardiovascular: Normal rate and regular rhythm. Exam reveals no friction rub.   No murmur heard.  Pulmonary/Chest: Effort normal and breath sounds normal. No stridor. No respiratory distress. He has no wheezes. He has no rales.   Abdominal: Soft. There is tenderness. There is no rebound.   Neurological: He is alert.   Skin: Skin is warm and dry.   Nursing note and vitals reviewed.          Significant Labs:   BMP:   Recent Labs   Lab 03/13/19  0749   *      K 3.3*      CO2 29   BUN 24*   CREATININE 1.0   CALCIUM 8.1*     CBC:   Recent Labs   Lab 03/12/19  1052 03/12/19  1126   WBC  --  17.56*   HGB  --  13.8*   HCT 45 46.4   PLT  --  431*       Significant Imaging:     X-ray chest normal.   Lipase 197

## 2019-03-13 NOTE — ASSESSMENT & PLAN NOTE
From above and from N/V. IV fluids.  BMP every 4.  Patient had normal function in 2018. Suspect will all correct with IV fluids.     Resolved.

## 2019-03-13 NOTE — SUBJECTIVE & OBJECTIVE
Past Medical History:   Diagnosis Date    Diabetes mellitus     Hepatitis     HTN (hypertension)     Pancreatitis        Past Surgical History:   Procedure Laterality Date    CARDIAC SURGERY  1999    stent placed. ([facility])    ESOPHAGOGASTRODUODENOSCOPY (EGD) Left 4/4/2016    Performed by [facility] at [facility]    SHOULDER SURGERY  2013    right shoulder, spur removal.       Review of patient's allergies indicates:  No Known Allergies    No current facility-administered medications on file prior to encounter.      Current Outpatient Medications on File Prior to Encounter   Medication Sig    atorvastatin (LIPITOR) 40 MG tablet Take 40 mg by mouth once daily.    gabapentin (NEURONTIN) 300 MG capsule Take 1 capsule (300 mg total) by mouth 2 (two) times daily.    ibuprofen (ADVIL,MOTRIN) 600 MG tablet Take 1 tablet (600 mg total) by mouth every 6 (six) hours as needed for Pain.    insulin aspart (NOVOLOG) 100 unit/mL InPn pen Inject 10 Units into the skin 3 (three) times daily.    LANTUS 100 unit/mL injection Inject 15 Units into the skin 2 (two) times daily.    nicotine (NICODERM CQ) 21 mg/24 hr Place 1 patch onto the skin once daily.    omeprazole (PRILOSEC) 40 MG capsule Take 1 capsule (40 mg total) by mouth once daily.     Family History     Problem Relation (Age of Onset)    Asthma Mother        Tobacco Use    Smoking status: Current Every Day Smoker     Packs/day: 1.00     Years: 20.00     Pack years: 20.00     Types: Cigarettes     Start date: 4/3/1981    Smokeless tobacco: Never Used   Substance and Sexual Activity    Alcohol use: No     Alcohol/week: 0.0 oz    Drug use: Yes     Types: IV, Marijuana, Heroin     Comment: currently, used today, herion    Sexual activity: Yes     Partners: Female     Review of Systems   Unable to perform ROS: Acuity of condition     Objective:     Vital Signs (Most Recent):  Temp: 98.3 °F (36.8 °C) (03/12/19 1600)  Pulse: 85 (03/12/19  1600)  Resp: (!) 23 (03/12/19 1600)  BP: (!) 117/56 (03/12/19 1600)  SpO2: 100 % (03/12/19 1600) Vital Signs (24h Range):  Temp:  [94.1 °F (34.5 °C)-98.3 °F (36.8 °C)] 98.3 °F (36.8 °C)  Pulse:  [] 85  Resp:  [23-39] 23  SpO2:  [98 %-100 %] 100 %  BP: (102-192)/(52-79) 117/56     Weight: 53.5 kg (117 lb 15.1 oz)  Body mass index is 18.47 kg/m².    Physical Exam   Constitutional: He appears well-developed and well-nourished.   HENT:   Head: Normocephalic and atraumatic.   Cardiovascular: Normal rate and regular rhythm. Exam reveals no friction rub.   No murmur heard.  Pulmonary/Chest: Effort normal and breath sounds normal. No stridor. No respiratory distress. He has no wheezes. He has no rales.   Abdominal: Soft. There is tenderness. There is no rebound.   Neurological: He is alert.   Skin: Skin is warm and dry.   Nursing note and vitals reviewed.          Significant Labs:   BMP:   Recent Labs   Lab 03/12/19  1710   *   *   K 4.0      CO2 9*   BUN 33*   CREATININE 1.7*   CALCIUM 8.1*     CBC:   Recent Labs   Lab 03/12/19  1052 03/12/19  1126   WBC  --  17.56*   HGB  --  13.8*   HCT 45 46.4   PLT  --  431*       Significant Imaging:     X-ray chest normal.   Lipase 197

## 2019-03-14 LAB
AORTIC ROOT ANNULUS: 3 CM
AORTIC VALVE CUSP SEPERATION: 1.57 CM
ASCENDING AORTA: 2.63 CM
AV INDEX (PROSTH): 0.55
AV MEAN GRADIENT: 9.33 MMHG
AV PEAK GRADIENT: 14.9 MMHG
AV VALVE AREA: 2.01 CM2
AV VELOCITY RATIO: 0.63
BASOPHILS # BLD AUTO: 0.01 K/UL
BASOPHILS NFR BLD: 0.1 %
BSA FOR ECHO PROCEDURE: 1.58 M2
CV ECHO LV RWT: 0.31 CM
DIFFERENTIAL METHOD: ABNORMAL
DOP CALC AO PEAK VEL: 1.93 M/S
DOP CALC AO VTI: 37.54 CM
DOP CALC LVOT AREA: 3.66 CM2
DOP CALC LVOT DIAMETER: 2.16 CM
DOP CALC LVOT PEAK VEL: 1.22 M/S
DOP CALC LVOT STROKE VOLUME: 75.48 CM3
DOP CALCLVOT PEAK VEL VTI: 20.61 CM
E WAVE DECELERATION TIME: 106.17 MSEC
E/A RATIO: 1.52
E/E' RATIO: 10.71
ECHO LV POSTERIOR WALL: 0.66 CM (ref 0.6–1.1)
EOSINOPHIL # BLD AUTO: 0 K/UL
EOSINOPHIL NFR BLD: 0 %
ERYTHROCYTE [DISTWIDTH] IN BLOOD BY AUTOMATED COUNT: 14.6 %
FRACTIONAL SHORTENING: 37 % (ref 28–44)
HCT VFR BLD AUTO: 37.6 %
HGB BLD-MCNC: 12.4 G/DL
INTERVENTRICULAR SEPTUM: 0.84 CM (ref 0.6–1.1)
IVRT: 0.04 MSEC
LA MAJOR: 4.68 CM
LA MINOR: 4.62 CM
LA WIDTH: 4.08 CM
LEFT ATRIUM SIZE: 2.97 CM
LEFT ATRIUM VOLUME INDEX: 29.7 ML/M2
LEFT ATRIUM VOLUME: 47.89 CM3
LEFT INTERNAL DIMENSION IN SYSTOLE: 2.7 CM (ref 2.1–4)
LEFT VENTRICLE DIASTOLIC VOLUME INDEX: 50.72 ML/M2
LEFT VENTRICLE DIASTOLIC VOLUME: 81.66 ML
LEFT VENTRICLE MASS INDEX: 59.4 G/M2
LEFT VENTRICLE SYSTOLIC VOLUME INDEX: 16.8 ML/M2
LEFT VENTRICLE SYSTOLIC VOLUME: 27 ML
LEFT VENTRICULAR INTERNAL DIMENSION IN DIASTOLE: 4.27 CM (ref 3.5–6)
LEFT VENTRICULAR MASS: 95.65 G
LIPASE SERPL-CCNC: 407 U/L
LV LATERAL E/E' RATIO: 8.33
LV SEPTAL E/E' RATIO: 15
LYMPHOCYTES # BLD AUTO: 0.8 K/UL
LYMPHOCYTES NFR BLD: 9.7 %
MCH RBC QN AUTO: 27.1 PG
MCHC RBC AUTO-ENTMCNC: 33 G/DL
MCV RBC AUTO: 82 FL
MONOCYTES # BLD AUTO: 0.5 K/UL
MONOCYTES NFR BLD: 6.3 %
MV PEAK A VEL: 0.99 M/S
MV PEAK E VEL: 1.5 M/S
NEUTROPHILS # BLD AUTO: 6.5 K/UL
NEUTROPHILS NFR BLD: 83.3 %
PISA TR MAX VEL: 3.01 M/S
PLATELET # BLD AUTO: 237 K/UL
PMV BLD AUTO: 9.9 FL
POCT GLUCOSE: 107 MG/DL (ref 70–110)
POCT GLUCOSE: 260 MG/DL (ref 70–110)
POCT GLUCOSE: 377 MG/DL (ref 70–110)
PULM VEIN S/D RATIO: 0.91
PV PEAK D VEL: 0.88 M/S
PV PEAK S VEL: 0.8 M/S
PV PEAK VELOCITY: 1.37 CM/S
RA MAJOR: 4.21 CM
RA PRESSURE: 3 MMHG
RA WIDTH: 3.01 CM
RBC # BLD AUTO: 4.58 M/UL
RIGHT VENTRICULAR END-DIASTOLIC DIMENSION: 3.61 CM
RV TISSUE DOPPLER FREE WALL SYSTOLIC VELOCITY 1 (APICAL 4 CHAMBER VIEW): 14.81 M/S
SINUS: 2.84 CM
STJ: 2.42 CM
TDI LATERAL: 0.18
TDI SEPTAL: 0.1
TDI: 0.14
TR MAX PG: 36.24 MMHG
TRICUSPID ANNULAR PLANE SYSTOLIC EXCURSION: 2.3 CM
TV REST PULMONARY ARTERY PRESSURE: 39 MMHG
WBC # BLD AUTO: 7.77 K/UL

## 2019-03-14 PROCEDURE — 11000001 HC ACUTE MED/SURG PRIVATE ROOM

## 2019-03-14 PROCEDURE — 25000003 PHARM REV CODE 250: Performed by: INTERNAL MEDICINE

## 2019-03-14 PROCEDURE — 63600175 PHARM REV CODE 636 W HCPCS: Performed by: HOSPITALIST

## 2019-03-14 PROCEDURE — 36415 COLL VENOUS BLD VENIPUNCTURE: CPT

## 2019-03-14 PROCEDURE — 63600175 PHARM REV CODE 636 W HCPCS: Performed by: INTERNAL MEDICINE

## 2019-03-14 PROCEDURE — 84484 ASSAY OF TROPONIN QUANT: CPT

## 2019-03-14 PROCEDURE — 83690 ASSAY OF LIPASE: CPT

## 2019-03-14 PROCEDURE — 97165 OT EVAL LOW COMPLEX 30 MIN: CPT

## 2019-03-14 PROCEDURE — 25000003 PHARM REV CODE 250: Performed by: HOSPITALIST

## 2019-03-14 PROCEDURE — 85025 COMPLETE CBC W/AUTO DIFF WBC: CPT

## 2019-03-14 PROCEDURE — S0028 INJECTION, FAMOTIDINE, 20 MG: HCPCS | Performed by: HOSPITALIST

## 2019-03-14 RX ORDER — KETOROLAC TROMETHAMINE 30 MG/ML
30 INJECTION, SOLUTION INTRAMUSCULAR; INTRAVENOUS EVERY 6 HOURS PRN
Status: DISCONTINUED | OUTPATIENT
Start: 2019-03-14 | End: 2019-03-17 | Stop reason: HOSPADM

## 2019-03-14 RX ORDER — SODIUM CHLORIDE, SODIUM LACTATE, POTASSIUM CHLORIDE, CALCIUM CHLORIDE 600; 310; 30; 20 MG/100ML; MG/100ML; MG/100ML; MG/100ML
INJECTION, SOLUTION INTRAVENOUS CONTINUOUS
Status: DISCONTINUED | OUTPATIENT
Start: 2019-03-14 | End: 2019-03-17 | Stop reason: HOSPADM

## 2019-03-14 RX ORDER — RAMELTEON 8 MG/1
8 TABLET ORAL NIGHTLY PRN
Status: DISCONTINUED | OUTPATIENT
Start: 2019-03-14 | End: 2019-03-17 | Stop reason: HOSPADM

## 2019-03-14 RX ORDER — FAMOTIDINE 10 MG/ML
20 INJECTION INTRAVENOUS 2 TIMES DAILY
Status: DISCONTINUED | OUTPATIENT
Start: 2019-03-14 | End: 2019-03-17 | Stop reason: HOSPADM

## 2019-03-14 RX ORDER — DIAZEPAM 5 MG/1
5 TABLET ORAL EVERY 8 HOURS PRN
Status: DISCONTINUED | OUTPATIENT
Start: 2019-03-14 | End: 2019-03-17 | Stop reason: HOSPADM

## 2019-03-14 RX ADMIN — INSULIN ASPART 10 UNITS: 100 INJECTION, SOLUTION INTRAVENOUS; SUBCUTANEOUS at 09:03

## 2019-03-14 RX ADMIN — RAMELTEON 8 MG: 8 TABLET, FILM COATED ORAL at 02:03

## 2019-03-14 RX ADMIN — KETOROLAC TROMETHAMINE 30 MG: 30 INJECTION, SOLUTION INTRAMUSCULAR; INTRAVENOUS at 07:03

## 2019-03-14 RX ADMIN — INSULIN ASPART 10 UNITS: 100 INJECTION, SOLUTION INTRAVENOUS; SUBCUTANEOUS at 03:03

## 2019-03-14 RX ADMIN — ENOXAPARIN SODIUM 40 MG: 100 INJECTION SUBCUTANEOUS at 06:03

## 2019-03-14 RX ADMIN — SODIUM CHLORIDE, SODIUM LACTATE, POTASSIUM CHLORIDE, AND CALCIUM CHLORIDE: .6; .31; .03; .02 INJECTION, SOLUTION INTRAVENOUS at 06:03

## 2019-03-14 RX ADMIN — INSULIN DETEMIR 15 UNITS: 100 INJECTION, SOLUTION SUBCUTANEOUS at 09:03

## 2019-03-14 RX ADMIN — DIAZEPAM 5 MG: 5 TABLET ORAL at 06:03

## 2019-03-14 RX ADMIN — DIAZEPAM 5 MG: 5 TABLET ORAL at 09:03

## 2019-03-14 RX ADMIN — FAMOTIDINE 20 MG: 10 INJECTION INTRAVENOUS at 08:03

## 2019-03-14 RX ADMIN — SODIUM CHLORIDE: 0.9 INJECTION, SOLUTION INTRAVENOUS at 01:03

## 2019-03-14 NOTE — CONSULTS
SW met with patient to offer resources for substance abuse treatment. Patient stated to SW he wanted to sleep and he's not interested in any resources right now.

## 2019-03-14 NOTE — PLAN OF CARE
Problem: Occupational Therapy Goal  Goal: Occupational Therapy Goal  Outcome: Ongoing (interventions implemented as appropriate)  Patient tolerated evaluation well, patient with no need for skilled OT services at this time. BLANQUITA Romo, MS

## 2019-03-14 NOTE — PLAN OF CARE
Problem: Adult Inpatient Plan of Care  Goal: Plan of Care Review  Outcome: Ongoing (interventions implemented as appropriate)   03/14/19 0310   Plan of Care Review   Plan of Care Reviewed With patient   Progress no change     Assumed care. AAOx4. H/O active drug use. Pt pressed the call button multiple times an hour, Dr. Campbell ordered Tylenol for pain, pt refused at first, threatened to get the F out of here soon, tylenol isnt doing crap, etc. PT was not happy about being NPO as well when I explained why he couldn't have juice or a cup of ice chips. He then complained throughout the night about not being able to sleep. A sleeping pill was then ordered nightly PRN.  NPO status was maintained, besides sips with meds. No further breakdown noted, No falls throughout shift. Urinal at bedside. Will continue to monitor

## 2019-03-14 NOTE — PLAN OF CARE
Patient is an ICU transfer to med-surg floor. LINN met with patient to introduce self as  and explain role as discharge planner. SW wrote name and phone number on patient communication board. Patient will return home with his mother when medically stable.          03/14/19 2307   Discharge Reassessment   Assessment Type Discharge Planning Reassessment   Provided patient/caregiver education on the expected discharge date and the discharge plan No   Do you have any problems affording any of your prescribed medications? No   Discharge Plan A Home with family  (PCP F/U)   Discharge Plan B Home with family   DME Needed Upon Discharge  (TBD)   Patient choice form signed by patient/caregiver N/A   Anticipated Discharge Disposition Home   Can the patient answer the patient profile reliably? Yes, cognitively intact   Describe the patient's ability to walk at the present time. Minor restrictions or changes   How often would a person be available to care for the patient? Occasionally

## 2019-03-14 NOTE — PT/OT/SLP EVAL
"Occupational Therapy   Evaluation and Discharge Note    Name: Cali Mabry  MRN: [id number]  Admitting Diagnosis:  Diabetic ketoacidosis      Recommendations:     Discharge Recommendations: (home with family)  Discharge Equipment Recommendations:  none  Barriers to discharge:  None    Assessment:     Cali Mabry is a 53 y.o. male with a medical diagnosis of Diabetic ketoacidosis. At this time, patient is functioning at their prior level of function and does not require further acute OT services.     Plan:     During this hospitalization, patient does not require further acute OT services.  Please re-consult if situation changes.    · Plan of Care Reviewed with: patient, caregiver(Marina, RN)    Subjective     Chief Complaint: "I want some food."  Patient/Family Comments/goals: to eat    Occupational Profile:  Living Environment: lives with his mother in a house  Previous level of function: modified independent with a straight cane  Equipment Used at home:  none  Assistance upon Discharge: mother    Pain/Comfort:  · Pain Rating 1: 0/10    Patients cultural, spiritual, Moravian conflicts given the current situation: no    Objective:     Communicated with: nurse Gray prior to session.  Patient found right sidelying with peripheral IV, oxygen upon OT entry to room.    General Precautions: Standard, NPO, fall   Orthopedic Precautions:N/A   Braces: N/A     Occupational Performance:    Bed Mobility:    · Patient completed Scooting/Bridging with independence  · Patient completed Supine to Sit with independence  · Patient completed Sit to Supine with independence    Functional Mobility/Transfers:  · Functional Mobility: Patient refused to stand up however nurse Gray stated that patient ambulated to the bathroom with CGA, no AD    Activities of Daily Living:  · Feeding: NPO   · Grooming: modified independence    · Upper Body Dressing: modified independence    · Toileting: supervision      Cognitive/Visual " Perceptual:  Cognitive/Psychosocial Skills:     -       Oriented to: Person and Situation   -       Follows Commands/attention:Inattentive and Easily distracted  -       Communication: clear/fluent  -       Memory: No Deficits noted  -       Safety awareness/insight to disability: impaired   -       Mood/Affect/Coping skills/emotional control: Flat affect, Lethargic and Withdrawn    Physical Exam:  Balance:    -       sit balance fair plus  Postural examination/scapula alignment:    -       Rounded shoulders  -       Forward head  Skin integrity: Visible skin intact  Upper Extremity Range of Motion:     -       Right Upper Extremity: WFL  -       Left Upper Extremity: WFL  Upper Extremity Strength:    -       Right Upper Extremity: WFL  -       Left Upper Extremity: WFL    AMPAC 6 Click ADL:  AMPAC Total Score: 21    Treatment & Education:  Evaluation   Education:    Patient left right sidelying with all lines intact, call button in reach and nurse Marina notified    GOALS:   Multidisciplinary Problems     Occupational Therapy Goals        Problem: Occupational Therapy Goal    Goal Priority Disciplines Outcome Interventions   Occupational Therapy Goal     OT, PT/OT Ongoing (interventions implemented as appropriate)                    History:     Past Medical History:   Diagnosis Date    Diabetes mellitus     Hepatitis     HTN (hypertension)     Pancreatitis        Past Surgical History:   Procedure Laterality Date    CARDIAC SURGERY  1999    stent placed. ([facility])    ESOPHAGOGASTRODUODENOSCOPY (EGD) Left 4/4/2016    Performed by Huber [facility] at [facility] END[facility]    SHOULDER SURGERY  2013    right shoulder, spur removal.       Time Tracking:     OT Date of Treatment: 03/14/19  OT Start Time: 1128  OT Stop Time: 1138  OT Total Time (min): 10 min    Billable Minutes:Evaluation 10 minutes    BLANQUITA Romo, MS  3/14/2019

## 2019-03-15 PROBLEM — R79.89 ELEVATED TROPONIN: Status: ACTIVE | Noted: 2019-03-15

## 2019-03-15 LAB
BASOPHILS # BLD AUTO: 0.01 K/UL
BASOPHILS NFR BLD: 0.1 %
CV STRESS BASE HR: 55 BPM
DIASTOLIC BLOOD PRESSURE: 79 MMHG
DIFFERENTIAL METHOD: ABNORMAL
EOSINOPHIL # BLD AUTO: 0.1 K/UL
EOSINOPHIL NFR BLD: 1.4 %
ERYTHROCYTE [DISTWIDTH] IN BLOOD BY AUTOMATED COUNT: 14.2 %
HCT VFR BLD AUTO: 36.2 %
HGB BLD-MCNC: 11.9 G/DL
LIPASE SERPL-CCNC: 550 U/L
LYMPHOCYTES # BLD AUTO: 1.4 K/UL
LYMPHOCYTES NFR BLD: 19.8 %
MCH RBC QN AUTO: 26.6 PG
MCHC RBC AUTO-ENTMCNC: 32.9 G/DL
MCV RBC AUTO: 81 FL
MONOCYTES # BLD AUTO: 0.5 K/UL
MONOCYTES NFR BLD: 7.7 %
NEUTROPHILS # BLD AUTO: 4.9 K/UL
NEUTROPHILS NFR BLD: 71 %
NUC REST EJECTION FRACTION: 53
OHS CV CPX 85 PERCENT MAX PREDICTED HEART RATE MALE: 143
OHS CV CPX MAX PREDICTED HEART RATE: 168
OHS CV CPX PATIENT IS FEMALE: 0
OHS CV CPX PATIENT IS MALE: 1
OHS CV CPX PEAK DIASTOLIC BLOOD PRESSURE: 76 MMHG
OHS CV CPX PEAK HEAR RATE: 93 BPM
OHS CV CPX PEAK RATE PRESSURE PRODUCT: NORMAL
OHS CV CPX PEAK SYSTOLIC BLOOD PRESSURE: 166 MMHG
OHS CV CPX PERCENT MAX PREDICTED HEART RATE ACHIEVED: 55
OHS CV CPX RATE PRESSURE PRODUCT PRESENTING: 7865
PLATELET # BLD AUTO: 247 K/UL
PMV BLD AUTO: 9.2 FL
POCT GLUCOSE: 126 MG/DL (ref 70–110)
POCT GLUCOSE: 207 MG/DL (ref 70–110)
POCT GLUCOSE: 221 MG/DL (ref 70–110)
POCT GLUCOSE: >500 MG/DL (ref 70–110)
RBC # BLD AUTO: 4.47 M/UL
SYSTOLIC BLOOD PRESSURE: 143 MMHG
TROPONIN I SERPL DL<=0.01 NG/ML-MCNC: 1.57 NG/ML
WBC # BLD AUTO: 6.92 K/UL

## 2019-03-15 PROCEDURE — 63600175 PHARM REV CODE 636 W HCPCS: Performed by: INTERNAL MEDICINE

## 2019-03-15 PROCEDURE — 83690 ASSAY OF LIPASE: CPT

## 2019-03-15 PROCEDURE — 25000003 PHARM REV CODE 250: Performed by: INTERNAL MEDICINE

## 2019-03-15 PROCEDURE — 99223 1ST HOSP IP/OBS HIGH 75: CPT | Mod: 25,,, | Performed by: INTERNAL MEDICINE

## 2019-03-15 PROCEDURE — 99223 PR INITIAL HOSPITAL CARE,LEVL III: ICD-10-PCS | Mod: 25,,, | Performed by: INTERNAL MEDICINE

## 2019-03-15 PROCEDURE — 25000003 PHARM REV CODE 250: Performed by: HOSPITALIST

## 2019-03-15 PROCEDURE — 11000001 HC ACUTE MED/SURG PRIVATE ROOM

## 2019-03-15 PROCEDURE — 36415 COLL VENOUS BLD VENIPUNCTURE: CPT

## 2019-03-15 PROCEDURE — 85025 COMPLETE CBC W/AUTO DIFF WBC: CPT

## 2019-03-15 PROCEDURE — 63600175 PHARM REV CODE 636 W HCPCS: Performed by: HOSPITALIST

## 2019-03-15 PROCEDURE — S0028 INJECTION, FAMOTIDINE, 20 MG: HCPCS | Performed by: HOSPITALIST

## 2019-03-15 RX ORDER — REGADENOSON 0.08 MG/ML
0.4 INJECTION, SOLUTION INTRAVENOUS ONCE
Status: COMPLETED | OUTPATIENT
Start: 2019-03-15 | End: 2019-03-15

## 2019-03-15 RX ORDER — ATORVASTATIN CALCIUM 40 MG/1
40 TABLET, FILM COATED ORAL DAILY
Status: DISCONTINUED | OUTPATIENT
Start: 2019-03-15 | End: 2019-03-15

## 2019-03-15 RX ADMIN — REGADENOSON 0.4 MG: 0.08 INJECTION, SOLUTION INTRAVENOUS at 10:03

## 2019-03-15 RX ADMIN — ENOXAPARIN SODIUM 40 MG: 100 INJECTION SUBCUTANEOUS at 06:03

## 2019-03-15 RX ADMIN — FAMOTIDINE 20 MG: 10 INJECTION INTRAVENOUS at 01:03

## 2019-03-15 RX ADMIN — DIAZEPAM 5 MG: 5 TABLET ORAL at 01:03

## 2019-03-15 RX ADMIN — KETOROLAC TROMETHAMINE 30 MG: 30 INJECTION, SOLUTION INTRAMUSCULAR; INTRAVENOUS at 01:03

## 2019-03-15 RX ADMIN — INSULIN ASPART 10 UNITS: 100 INJECTION, SOLUTION INTRAVENOUS; SUBCUTANEOUS at 09:03

## 2019-03-15 RX ADMIN — FAMOTIDINE 20 MG: 10 INJECTION INTRAVENOUS at 09:03

## 2019-03-15 RX ADMIN — RAMELTEON 8 MG: 8 TABLET, FILM COATED ORAL at 09:03

## 2019-03-15 RX ADMIN — DIAZEPAM 5 MG: 5 TABLET ORAL at 09:03

## 2019-03-15 RX ADMIN — KETOROLAC TROMETHAMINE 30 MG: 30 INJECTION, SOLUTION INTRAMUSCULAR; INTRAVENOUS at 06:03

## 2019-03-15 RX ADMIN — INSULIN ASPART 10 UNITS: 100 INJECTION, SOLUTION INTRAVENOUS; SUBCUTANEOUS at 01:03

## 2019-03-15 RX ADMIN — KETOROLAC TROMETHAMINE 30 MG: 30 INJECTION, SOLUTION INTRAMUSCULAR; INTRAVENOUS at 07:03

## 2019-03-15 RX ADMIN — SODIUM CHLORIDE, SODIUM LACTATE, POTASSIUM CHLORIDE, AND CALCIUM CHLORIDE: .6; .31; .03; .02 INJECTION, SOLUTION INTRAVENOUS at 01:03

## 2019-03-15 RX ADMIN — SODIUM CHLORIDE, SODIUM LACTATE, POTASSIUM CHLORIDE, AND CALCIUM CHLORIDE: .6; .31; .03; .02 INJECTION, SOLUTION INTRAVENOUS at 11:03

## 2019-03-15 RX ADMIN — INSULIN ASPART 4 UNITS: 100 INJECTION, SOLUTION INTRAVENOUS; SUBCUTANEOUS at 06:03

## 2019-03-15 NOTE — PROGRESS NOTES
[facility] Hospital Medicine  Progress Note    Patient Name: Cali Mabry  MRN: [id number]  Patient Class: IP- Inpatient   Admission Date: 3/12/2019  Length of Stay: 3 days  Attending Physician: Shawnee Nicholas MD  Primary Care Provider: Jacob Montanez MD        Subjective:     Principal Problem:Diabetic ketoacidosis    HPI:  Mr. Mabry is a 54 yo M who presents to the hospital stating he has not felt well for the past 2 days with N/V. Patient is known insulin dependent diabetic. He is also a know IV drug user. His mother notes issues of compliance with his insulin. He presents in DKA with AG of 28 and blood sugar > 700.  Patient is somewhat altered and most of the history is from the patient's mother. The patient presents with multiple electrolyte disorders, hypothermic and in acute renal failure (normal renal function 2018). The patient was started on an insulin and Hc03 drip and will be going to the ICU.  Cause of DKA could be from non-compliance, infection or possibly acute pancreatitis.     Hospital Course:  Mr. Mabry is a 54 yo M who presents to the hospital stating he has not felt well for the past 2 days with N/V. Patient is known insulin dependent diabetic. He is also a know IV drug user. His mother notes issues of compliance with his insulin. He presents in DKA with AG of 28 and blood sugar > 700.  Patient is somewhat altered and most of the history is from the patient's mother. The patient presents with multiple electrolyte disorders, hypothermic and in acute renal failure (normal renal function 2018). Patient was started on an insulin drip and sent to the ICU. Patient clinically improved over 24 hours. Blood cultures were NG.  Vanc stopped.  Patient started on diet and home insulin regimen. The patient was transferred to the floor on 3/13.     3/14- pt had elevated lipase and abdominal pain. Held NPO and given IVF for pancreatitis. Toradol given.     Interval History:  pt c/o 10/10 abdominal pain    Review of Systems   Constitutional: Negative for activity change.   HENT: Negative for congestion.    Respiratory: Negative for chest tightness and shortness of breath.    Cardiovascular: Negative for chest pain.   Gastrointestinal: Negative for abdominal pain.   Genitourinary: Negative for difficulty urinating.   Musculoskeletal: Negative for arthralgias.     Objective:     Vital Signs (Most Recent):  Temp: 98.1 °F (36.7 °C) (03/15/19 0748)  Pulse: (!) 56 (03/15/19 0748)  Resp: 18 (03/15/19 0748)  BP: 122/67 (03/15/19 0748)  SpO2: 98 % (03/15/19 0748) Vital Signs (24h Range):  Temp:  [97.6 °F (36.4 °C)-99 °F (37.2 °C)] 98.1 °F (36.7 °C)  Pulse:  [56-94] 56  Resp:  [17-32] 18  SpO2:  [98 %-100 %] 98 %  BP: (122-153)/(67-74) 122/67     Weight: 53.1 kg (117 lb)  Body mass index is 18.32 kg/m².    Intake/Output Summary (Last 24 hours) at 3/15/2019 1019  Last data filed at 3/15/2019 0900  Gross per 24 hour   Intake 0 ml   Output 1650 ml   Net -1650 ml      Physical Exam   Constitutional: He is oriented to person, place, and time. He appears well-developed and well-nourished.   HENT:   Head: Normocephalic and atraumatic.   Cardiovascular: Normal rate and regular rhythm.   Pulmonary/Chest: Effort normal.   Neurological: He is alert and oriented to person, place, and time.   Psychiatric: He has a normal mood and affect. His behavior is normal.   Vitals reviewed.      Significant Labs: All pertinent labs within the past 24 hours have been reviewed.    Significant Imaging: I have reviewed and interpreted all pertinent imaging results/findings within the past 24 hours.    Assessment/Plan:      * Diabetic ketoacidosis    Very severe. And very ill patient.  Reasons could be infection from IV drug use, pancreatitis, and/or compliance issues. Insulin drip and Hc03 for now.  Will cover with Vanc and Zosyn given critical nature of patient.  Will check blood cultures. IV insulin, fluids. BMP every 4  hours.      Resolved. D/C insulin drip. Start on home insulin therapy.  A1c        IV drug user    With heroin. Will watch for withdrawal. Not sure of last use.          Hypothermia    From acute illness. Warming blanket     Resolved.        Hyperkalemia    Should correct with fluids and Hc03. Repeat BMP in 4 hours     Resolved.        Acute renal failure    From above and from N/V. IV fluids.  BMP every 4.  Patient had normal function in 2018. Suspect will all correct with IV fluids.     Resolved.          Heroin abuse    Has been in Rehab in the past. Will consult  for out patient drug rehab resources.          Tobacco abuse    Smoking cessation education when appropriate        Chronic relapsing pancreatitis    May be cause of patient's abdominal pain. Lipase just a little elevated- in setting of volume contraction- may be falsely high.  Consider CT abdomen. NPO        Chronic hepatitis C    No acute issues.        Anemia of chronic disease    No acute issues        Intravenous drug abuse    Blood cultures are NG. Will d/c Vanc.        Hyperlipidemia    Resume home meds when able.        Acute on chronic pancreatitis    Possible.  Abdominal pain resolved mainly.  Start on diet.   His mother denied ETOH use          Essential hypertension    Resume home meds when able.        Type 2 diabetes mellitus, uncontrolled    Will check an A1c. Insulin drip for now.  No known manifestations          VTE Risk Mitigation (From admission, onward)        Ordered     enoxaparin injection 40 mg  Daily      03/13/19 1058     IP VTE LOW RISK PATIENT  Once      03/12/19 1614              Shawnee Nicholas MD  Department of Hospital Medicine   [facility]

## 2019-03-15 NOTE — CARE UPDATE
Stress test 3/15/19  · The perfusion scan is free of evidence from myocardial ischemia or injury.  · There is a mild intensity fixed defect in the inferior wall of the left ventricle secondary to diaphragm attenuation.  · An ejection fraction of 53 % at rest  · The EKG portion of this study is negative for myocardial ischemia.     Echo 3/14/19  · Normal left ventricular systolic function. The estimated ejection fraction is 60  Continue medical Rx  Will f/u prn

## 2019-03-15 NOTE — PROGRESS NOTES
SW met with patient to discuss discharge plan. Patient confirmed his PCP is Dr. Stefan Hung and he prefers afternoon doctor appointments.

## 2019-03-15 NOTE — SUBJECTIVE & OBJECTIVE
Past Medical History:   Diagnosis Date    Diabetes mellitus     Hepatitis     HTN (hypertension)     Pancreatitis        Past Surgical History:   Procedure Laterality Date    CARDIAC SURGERY  1999    stent placed. ([facility])    ESOPHAGOGASTRODUODENOSCOPY (EGD) Left 4/4/2016    Performed by Huber Camarena MD at [facility] ENDO    SHOULDER SURGERY  2013    right shoulder, spur removal.       Review of patient's allergies indicates:  No Known Allergies    No current facility-administered medications on file prior to encounter.      Current Outpatient Medications on File Prior to Encounter   Medication Sig    atorvastatin (LIPITOR) 40 MG tablet Take 40 mg by mouth once daily.    gabapentin (NEURONTIN) 300 MG capsule Take 1 capsule (300 mg total) by mouth 2 (two) times daily.    ibuprofen (ADVIL,MOTRIN) 600 MG tablet Take 1 tablet (600 mg total) by mouth every 6 (six) hours as needed for Pain.    insulin aspart (NOVOLOG) 100 unit/mL InPn pen Inject 10 Units into the skin 3 (three) times daily.    LANTUS 100 unit/mL injection Inject 15 Units into the skin 2 (two) times daily.    nicotine (NICODERM CQ) 21 mg/24 hr Place 1 patch onto the skin once daily.    omeprazole (PRILOSEC) 40 MG capsule Take 1 capsule (40 mg total) by mouth once daily.     Family History     Problem Relation (Age of Onset)    Asthma Mother        Tobacco Use    Smoking status: Current Every Day Smoker     Packs/day: 1.00     Years: 20.00     Pack years: 20.00     Types: Cigarettes     Start date: 4/3/1981    Smokeless tobacco: Never Used   Substance and Sexual Activity    Alcohol use: No     Alcohol/week: 0.0 oz    Drug use: Yes     Types: IV, Marijuana, Heroin     Comment: currently, used today, herion    Sexual activity: Yes     Partners: Female     Review of Systems   Constitution: Negative for decreased appetite.   HENT: Negative for ear discharge.    Eyes: Negative for blurred vision.   Endocrine: Negative for polyphagia.    Skin: Negative for nail changes.   Genitourinary: Negative for bladder incontinence.   Neurological: Negative for aphonia.   Psychiatric/Behavioral: Negative for hallucinations.   Allergic/Immunologic: Negative for hives.     Objective:     Vital Signs (Most Recent):  Temp: 98.1 °F (36.7 °C) (03/15/19 0748)  Pulse: (!) 56 (03/15/19 0748)  Resp: 18 (03/15/19 0748)  BP: 122/67 (03/15/19 0748)  SpO2: 98 % (03/15/19 0748) Vital Signs (24h Range):  Temp:  [97.6 °F (36.4 °C)-99 °F (37.2 °C)] 98.1 °F (36.7 °C)  Pulse:  [56-94] 56  Resp:  [17-32] 18  SpO2:  [98 %-100 %] 98 %  BP: (122-153)/(67-74) 122/67     Weight: 53.1 kg (117 lb)  Body mass index is 18.32 kg/m².    SpO2: 98 %  O2 Device (Oxygen Therapy): room air      Intake/Output Summary (Last 24 hours) at 3/15/2019 1050  Last data filed at 3/15/2019 0900  Gross per 24 hour   Intake 0 ml   Output 1650 ml   Net -1650 ml       Lines/Drains/Airways     Peripheral Intravenous Line                 Peripheral IV - Single Lumen 03/12/19 Left Antecubital 3 days         Peripheral IV - Single Lumen 03/12/19 1115 Right Forearm 2 days         Peripheral IV - Single Lumen 03/12/19 1355 Left Forearm 2 days                Physical Exam   Constitutional: He is oriented to person, place, and time. He appears well-developed and well-nourished.   HENT:   Head: Normocephalic and atraumatic.   Eyes: Conjunctivae are normal. Pupils are equal, round, and reactive to light.   Neck: Normal range of motion. Neck supple.   Cardiovascular: Normal rate, normal heart sounds and intact distal pulses.   Pulmonary/Chest: Effort normal and breath sounds normal.   Abdominal: Soft. Bowel sounds are normal.   Musculoskeletal: Normal range of motion.   Neurological: He is alert and oriented to person, place, and time.   Skin: Skin is warm and dry.       Significant Labs: All pertinent lab results from the last 24 hours have been reviewed.    Significant Imaging: Echocardiogram:   2D echo with color flow  doppler:   Results for orders placed or performed during the hospital encounter of 11/22/17   2D echo with color flow doppler   Result Value Ref Range    QEF 55 55 - 65    Mitral Valve Regurgitation TRIVIAL     Diastolic Dysfunction No     Est. PA Systolic Pressure 28.81     Tricuspid Valve Regurgitation TRIVIAL

## 2019-03-15 NOTE — ASSESSMENT & PLAN NOTE
Mild elevation trending down. Denies CP. Suspect demand ischemia from pancreatitis and recent DKA. Stress test today. Will treat conservatively unless significant ischemia is present. Echo with good EF

## 2019-03-15 NOTE — CONSULTS
[facility]  Cardiology  Consult Note    Patient Name: Cali Mabry  MRN: [id number]  Admission Date: 3/12/2019  Hospital Length of Stay: 3 days  Code Status: Full Code   Attending Provider: Shawnee Nicholas MD   Consulting Provider: Glen Fisher MD  Primary Care Physician: Jacob Montanez MD  Principal Problem:Diabetic ketoacidosis    Patient information was obtained from patient and ER records.     Consults  Subjective:     Chief Complaint:  Elevated troponin     HPI:   Mr. Mabry is a 52 yo M who presents to the hospital stating he has not felt well for the past 2 days with N/V. Patient is known insulin dependent diabetic. He is also a know IV drug user. His mother notes issues of compliance with his insulin. He presents in DKA with AG of 28 and blood sugar > 700.  Patient is somewhat altered and most of the history is from the patient's mother. The patient presents with multiple electrolyte disorders, hypothermic and in acute renal failure (normal renal function 2018). The patient was started on an insulin and Hc03 drip and will be going to the ICU.  Cause of DKA could be from non-compliance, infection or possibly acute pancreatitis.      Hospital Course:  Mr. Mabry is a 52 yo M who presents to the hospital stating he has not felt well for the past 2 days with N/V. Patient is known insulin dependent diabetic. He is also a know IV drug user. His mother notes issues of compliance with his insulin. He presents in DKA with AG of 28 and blood sugar > 700.  Patient is somewhat altered and most of the history is from the patient's mother. The patient presents with multiple electrolyte disorders, hypothermic and in acute renal failure (normal renal function 2018). Patient was started on an insulin drip and sent to the ICU. Patient clinically improved over 24 hours. Blood cultures were NG.  Vanc stopped.  Patient started on diet and home insulin regimen. The patient was transferred to  the floor on 3/13.      3/14- pt had elevated lipase and abdominal pain. Held NPO and given IVF for pancreatitis. Toradol given.     Denies CP or SOB  Troponin 1.7 down to 1.5  EKG NSR no acute STT changes    Echo 3/14/19  · Normal left ventricular systolic function. The estimated ejection fraction is 60%     Denies prior CAD    Past Medical History:   Diagnosis Date    Diabetes mellitus     Hepatitis     HTN (hypertension)     Pancreatitis        Past Surgical History:   Procedure Laterality Date    CARDIAC SURGERY  1999    stent placed. ([facility])    ESOPHAGOGASTRODUODENOSCOPY (EGD) Left 4/4/2016    Performed by Huber Camarena MD at [facility]    SHOULDER SURGERY  2013    right shoulder, spur removal.       Review of patient's allergies indicates:  No Known Allergies    No current facility-administered medications on file prior to encounter.      Current Outpatient Medications on File Prior to Encounter   Medication Sig    atorvastatin (LIPITOR) 40 MG tablet Take 40 mg by mouth once daily.    gabapentin (NEURONTIN) 300 MG capsule Take 1 capsule (300 mg total) by mouth 2 (two) times daily.    ibuprofen (ADVIL,MOTRIN) 600 MG tablet Take 1 tablet (600 mg total) by mouth every 6 (six) hours as needed for Pain.    insulin aspart (NOVOLOG) 100 unit/mL InPn pen Inject 10 Units into the skin 3 (three) times daily.    LANTUS 100 unit/mL injection Inject 15 Units into the skin 2 (two) times daily.    nicotine (NICODERM CQ) 21 mg/24 hr Place 1 patch onto the skin once daily.    omeprazole (PRILOSEC) 40 MG capsule Take 1 capsule (40 mg total) by mouth once daily.     Family History     Problem Relation (Age of Onset)    Asthma Mother        Tobacco Use    Smoking status: Current Every Day Smoker     Packs/day: 1.00     Years: 20.00     Pack years: 20.00     Types: Cigarettes     Start date: 4/3/1981    Smokeless tobacco: Never Used   Substance and Sexual Activity    Alcohol use: No     Alcohol/week:  0.0 oz    Drug use: Yes     Types: IV, Marijuana, Heroin     Comment: currently, used today, herion    Sexual activity: Yes     Partners: Female     Review of Systems   Constitution: Negative for decreased appetite.   HENT: Negative for ear discharge.    Eyes: Negative for blurred vision.   Endocrine: Negative for polyphagia.   Skin: Negative for nail changes.   Genitourinary: Negative for bladder incontinence.   Neurological: Negative for aphonia.   Psychiatric/Behavioral: Negative for hallucinations.   Allergic/Immunologic: Negative for hives.     Objective:     Vital Signs (Most Recent):  Temp: 98.1 °F (36.7 °C) (03/15/19 0748)  Pulse: (!) 56 (03/15/19 0748)  Resp: 18 (03/15/19 0748)  BP: 122/67 (03/15/19 0748)  SpO2: 98 % (03/15/19 0748) Vital Signs (24h Range):  Temp:  [97.6 °F (36.4 °C)-99 °F (37.2 °C)] 98.1 °F (36.7 °C)  Pulse:  [56-94] 56  Resp:  [17-32] 18  SpO2:  [98 %-100 %] 98 %  BP: (122-153)/(67-74) 122/67     Weight: 53.1 kg (117 lb)  Body mass index is 18.32 kg/m².    SpO2: 98 %  O2 Device (Oxygen Therapy): room air      Intake/Output Summary (Last 24 hours) at 3/15/2019 1050  Last data filed at 3/15/2019 0900  Gross per 24 hour   Intake 0 ml   Output 1650 ml   Net -1650 ml       Lines/Drains/Airways     Peripheral Intravenous Line                 Peripheral IV - Single Lumen 03/12/19 Left Antecubital 3 days         Peripheral IV - Single Lumen 03/12/19 1115 Right Forearm 2 days         Peripheral IV - Single Lumen 03/12/19 1355 Left Forearm 2 days                Physical Exam   Constitutional: He is oriented to person, place, and time. He appears well-developed and well-nourished.   HENT:   Head: Normocephalic and atraumatic.   Eyes: Conjunctivae are normal. Pupils are equal, round, and reactive to light.   Neck: Normal range of motion. Neck supple.   Cardiovascular: Normal rate, normal heart sounds and intact distal pulses.   Pulmonary/Chest: Effort normal and breath sounds normal.   Abdominal:  Soft. Bowel sounds are normal.   Musculoskeletal: Normal range of motion.   Neurological: He is alert and oriented to person, place, and time.   Skin: Skin is warm and dry.       Significant Labs: All pertinent lab results from the last 24 hours have been reviewed.    Significant Imaging: Echocardiogram:   2D echo with color flow doppler:   Results for orders placed or performed during the hospital encounter of 11/22/17   2D echo with color flow doppler   Result Value Ref Range    QEF 55 55 - 65    Mitral Valve Regurgitation TRIVIAL     Diastolic Dysfunction No     Est. PA Systolic Pressure 28.81     Tricuspid Valve Regurgitation TRIVIAL      Assessment and Plan:     Elevated troponin    Mild elevation trending down. Denies CP. Suspect demand ischemia from pancreatitis and recent DKA. Stress test today. Will treat conservatively unless significant ischemia is present. Echo with good EF     IV drug user    Per primary     Heroin abuse    counseled     Chronic hepatitis C          Hyperlipidemia    Will hold off on statin with Hep C     Pancreatitis, chronic    Per primary     Essential hypertension          Type 2 diabetes mellitus, uncontrolled    Per primary. DKA resolved         VTE Risk Mitigation (From admission, onward)        Ordered     enoxaparin injection 40 mg  Daily      03/13/19 1058     IP VTE LOW RISK PATIENT  Once      03/12/19 1614          Thank you for your consult. I will follow-up with patient. Please contact us if you have any additional questions.    Glen Fisher MD  Cardiology   [facility]

## 2019-03-15 NOTE — SUBJECTIVE & OBJECTIVE
Interval History: pt c/o 10/10 abdominal pain    Review of Systems   Constitutional: Negative for activity change.   HENT: Negative for congestion.    Respiratory: Negative for chest tightness and shortness of breath.    Cardiovascular: Negative for chest pain.   Gastrointestinal: Negative for abdominal pain.   Genitourinary: Negative for difficulty urinating.   Musculoskeletal: Negative for arthralgias.     Objective:     Vital Signs (Most Recent):  Temp: 98.1 °F (36.7 °C) (03/15/19 0748)  Pulse: (!) 56 (03/15/19 0748)  Resp: 18 (03/15/19 0748)  BP: 122/67 (03/15/19 0748)  SpO2: 98 % (03/15/19 0748) Vital Signs (24h Range):  Temp:  [97.6 °F (36.4 °C)-99 °F (37.2 °C)] 98.1 °F (36.7 °C)  Pulse:  [56-94] 56  Resp:  [17-32] 18  SpO2:  [98 %-100 %] 98 %  BP: (122-153)/(67-74) 122/67     Weight: 53.1 kg (117 lb)  Body mass index is 18.32 kg/m².    Intake/Output Summary (Last 24 hours) at 3/15/2019 1019  Last data filed at 3/15/2019 0900  Gross per 24 hour   Intake 0 ml   Output 1650 ml   Net -1650 ml      Physical Exam   Constitutional: He is oriented to person, place, and time. He appears well-developed and well-nourished.   HENT:   Head: Normocephalic and atraumatic.   Cardiovascular: Normal rate and regular rhythm.   Pulmonary/Chest: Effort normal.   Neurological: He is alert and oriented to person, place, and time.   Psychiatric: He has a normal mood and affect. His behavior is normal.   Vitals reviewed.      Significant Labs: All pertinent labs within the past 24 hours have been reviewed.    Significant Imaging: I have reviewed and interpreted all pertinent imaging results/findings within the past 24 hours.

## 2019-03-15 NOTE — HPI
Mr. Mabry is a 54 yo M who presents to the hospital stating he has not felt well for the past 2 days with N/V. Patient is known insulin dependent diabetic. He is also a know IV drug user. His mother notes issues of compliance with his insulin. He presents in DKA with AG of 28 and blood sugar > 700.  Patient is somewhat altered and most of the history is from the patient's mother. The patient presents with multiple electrolyte disorders, hypothermic and in acute renal failure (normal renal function 2018). The patient was started on an insulin and Hc03 drip and will be going to the ICU.  Cause of DKA could be from non-compliance, infection or possibly acute pancreatitis.      Hospital Course:  Mr. Mabry is a 54 yo M who presents to the hospital stating he has not felt well for the past 2 days with N/V. Patient is known insulin dependent diabetic. He is also a know IV drug user. His mother notes issues of compliance with his insulin. He presents in DKA with AG of 28 and blood sugar > 700.  Patient is somewhat altered and most of the history is from the patient's mother. The patient presents with multiple electrolyte disorders, hypothermic and in acute renal failure (normal renal function 2018). Patient was started on an insulin drip and sent to the ICU. Patient clinically improved over 24 hours. Blood cultures were NG.  Vanc stopped.  Patient started on diet and home insulin regimen. The patient was transferred to the floor on 3/13.      3/14- pt had elevated lipase and abdominal pain. Held NPO and given IVF for pancreatitis. Toradol given.     Denies CP or SOB  Troponin 1.7 down to 1.5  EKG NSR no acute STT changes    Echo 3/14/19  · Normal left ventricular systolic function. The estimated ejection fraction is 60%     Denies prior CAD

## 2019-03-15 NOTE — PLAN OF CARE
SW met with patient to explain IMM Notice, patient verbalized understanding.       03/15/19 135   Medicare Message   Important Message from Medicare regarding Discharge Appeal Rights Given to patient/caregiver;Explained to patient/caregiver;Signed/date by patient/caregiver

## 2019-03-15 NOTE — PT/OT/SLP PROGRESS
Physical Therapy      Patient Name:  Cali Mabry   MRN:  [id number]    Patient not seen today for PT eval secondary to Patient unwilling to participate, Pain. Pt reported not feeling well today, c/o R lower abdominal pain. Pt stated that he does not need physical therapy. Pt reported that he is ambulating without any problems. Nurse Marina notified and reported that pt is not ambulating very well with nursing assistance to the bathroom. Despite max encouragement and education, pt continued to decline acute skilled PT services. Will follow-up as able.    Fany Cramer, PT

## 2019-03-15 NOTE — PLAN OF CARE
Problem: Adult Inpatient Plan of Care  Goal: Plan of Care Review  Outcome: Ongoing (interventions implemented as appropriate)   03/15/19 0740   Plan of Care Review   Plan of Care Reviewed With patient   Progress improving    NPO status was maintained besides sips with meds and ice chips. No falls throughout shift. Urinal at bedside. Pain medication and Anxiety meds were given as ordered. Ice pack placed on pts back for pain. Requesting back rubs. Iv site intact dry and clean,  Will continue to monitor SEDs in place. Call light within reach

## 2019-03-16 LAB
BASOPHILS # BLD AUTO: 0 K/UL
BASOPHILS NFR BLD: 0 %
DIFFERENTIAL METHOD: ABNORMAL
EOSINOPHIL # BLD AUTO: 0.1 K/UL
EOSINOPHIL NFR BLD: 1.3 %
ERYTHROCYTE [DISTWIDTH] IN BLOOD BY AUTOMATED COUNT: 13.7 %
HCT VFR BLD AUTO: 41 %
HGB BLD-MCNC: 13.8 G/DL
LIPASE SERPL-CCNC: 520 U/L
LYMPHOCYTES # BLD AUTO: 1.4 K/UL
LYMPHOCYTES NFR BLD: 22.8 %
MCH RBC QN AUTO: 26.8 PG
MCHC RBC AUTO-ENTMCNC: 33.7 G/DL
MCV RBC AUTO: 80 FL
MONOCYTES # BLD AUTO: 0.6 K/UL
MONOCYTES NFR BLD: 9.3 %
NEUTROPHILS # BLD AUTO: 4.1 K/UL
NEUTROPHILS NFR BLD: 66.6 %
PLATELET # BLD AUTO: 215 K/UL
PMV BLD AUTO: 8.9 FL
POCT GLUCOSE: 128 MG/DL (ref 70–110)
POCT GLUCOSE: 132 MG/DL (ref 70–110)
POCT GLUCOSE: 159 MG/DL (ref 70–110)
POCT GLUCOSE: 162 MG/DL (ref 70–110)
POCT GLUCOSE: 190 MG/DL (ref 70–110)
POCT GLUCOSE: 32 MG/DL (ref 70–110)
POCT GLUCOSE: 321 MG/DL (ref 70–110)
POCT GLUCOSE: 413 MG/DL (ref 70–110)
POCT GLUCOSE: 46 MG/DL (ref 70–110)
POCT GLUCOSE: <20 MG/DL (ref 70–110)
RBC # BLD AUTO: 5.14 M/UL
WBC # BLD AUTO: 6.14 K/UL

## 2019-03-16 PROCEDURE — 11000001 HC ACUTE MED/SURG PRIVATE ROOM

## 2019-03-16 PROCEDURE — 63600175 PHARM REV CODE 636 W HCPCS: Performed by: HOSPITALIST

## 2019-03-16 PROCEDURE — S4991 NICOTINE PATCH NONLEGEND: HCPCS | Performed by: HOSPITALIST

## 2019-03-16 PROCEDURE — 25000003 PHARM REV CODE 250: Performed by: HOSPITALIST

## 2019-03-16 PROCEDURE — 36415 COLL VENOUS BLD VENIPUNCTURE: CPT

## 2019-03-16 PROCEDURE — 85025 COMPLETE CBC W/AUTO DIFF WBC: CPT

## 2019-03-16 PROCEDURE — S0028 INJECTION, FAMOTIDINE, 20 MG: HCPCS | Performed by: HOSPITALIST

## 2019-03-16 PROCEDURE — 63600175 PHARM REV CODE 636 W HCPCS: Performed by: INTERNAL MEDICINE

## 2019-03-16 PROCEDURE — 83690 ASSAY OF LIPASE: CPT

## 2019-03-16 PROCEDURE — 25000003 PHARM REV CODE 250: Performed by: EMERGENCY MEDICINE

## 2019-03-16 RX ORDER — IBUPROFEN 200 MG
1 TABLET ORAL DAILY
Status: DISCONTINUED | OUTPATIENT
Start: 2019-03-17 | End: 2019-03-16

## 2019-03-16 RX ORDER — IBUPROFEN 200 MG
1 TABLET ORAL DAILY
Status: DISCONTINUED | OUTPATIENT
Start: 2019-03-16 | End: 2019-03-17 | Stop reason: HOSPADM

## 2019-03-16 RX ADMIN — KETOROLAC TROMETHAMINE 30 MG: 30 INJECTION, SOLUTION INTRAMUSCULAR; INTRAVENOUS at 12:03

## 2019-03-16 RX ADMIN — FAMOTIDINE 20 MG: 10 INJECTION INTRAVENOUS at 11:03

## 2019-03-16 RX ADMIN — SODIUM CHLORIDE, SODIUM LACTATE, POTASSIUM CHLORIDE, AND CALCIUM CHLORIDE: .6; .31; .03; .02 INJECTION, SOLUTION INTRAVENOUS at 11:03

## 2019-03-16 RX ADMIN — KETOROLAC TROMETHAMINE 30 MG: 30 INJECTION, SOLUTION INTRAMUSCULAR; INTRAVENOUS at 06:03

## 2019-03-16 RX ADMIN — DEXTROSE MONOHYDRATE 25 G: 25 INJECTION, SOLUTION INTRAVENOUS at 07:03

## 2019-03-16 RX ADMIN — DEXTROSE MONOHYDRATE 25 G: 25 INJECTION, SOLUTION INTRAVENOUS at 12:03

## 2019-03-16 RX ADMIN — ENOXAPARIN SODIUM 40 MG: 100 INJECTION SUBCUTANEOUS at 04:03

## 2019-03-16 RX ADMIN — DIAZEPAM 5 MG: 5 TABLET ORAL at 04:03

## 2019-03-16 RX ADMIN — DIAZEPAM 5 MG: 5 TABLET ORAL at 06:03

## 2019-03-16 RX ADMIN — SODIUM CHLORIDE, SODIUM LACTATE, POTASSIUM CHLORIDE, AND CALCIUM CHLORIDE: .6; .31; .03; .02 INJECTION, SOLUTION INTRAVENOUS at 03:03

## 2019-03-16 RX ADMIN — NICOTINE 1 PATCH: 21 PATCH, EXTENDED RELEASE TRANSDERMAL at 05:03

## 2019-03-16 RX ADMIN — FAMOTIDINE 20 MG: 10 INJECTION INTRAVENOUS at 08:03

## 2019-03-16 NOTE — NURSING
"Patient called out stated "I think my sugar has dropped, I need a piece of hard candy". Accucheck less than 20. Blood sugar rechecked at <49. Patient asymptomatic. Patient was drinking apple juice and eating jello. Patient given PRN 25G of D50 IV. Dr. Wynn notified. No new orders.  "

## 2019-03-16 NOTE — SUBJECTIVE & OBJECTIVE
Interval History: pt asking for pain meds, explained that with drug history will not receive     Review of Systems   Constitutional: Negative for activity change.   HENT: Negative for congestion.    Respiratory: Negative for chest tightness and shortness of breath.    Cardiovascular: Negative for chest pain.   Gastrointestinal: Negative for abdominal pain.   Genitourinary: Negative for difficulty urinating.   Musculoskeletal: Negative for arthralgias.     Objective:     Vital Signs (Most Recent):  Temp: 99.2 °F (37.3 °C) (03/15/19 1956)  Pulse: (!) 57 (03/15/19 1956)  Resp: 18 (03/15/19 1956)  BP: (!) 149/80 (03/15/19 1956)  SpO2: 97 % (03/15/19 1956) Vital Signs (24h Range):  Temp:  [98.1 °F (36.7 °C)-99.2 °F (37.3 °C)] 99.2 °F (37.3 °C)  Pulse:  [56-64] 57  Resp:  [18] 18  SpO2:  [97 %-99 %] 97 %  BP: (122-151)/(67-81) 149/80     Weight: 53.1 kg (117 lb)  Body mass index is 18.32 kg/m².    Intake/Output Summary (Last 24 hours) at 3/15/2019 2120  Last data filed at 3/15/2019 1800  Gross per 24 hour   Intake 240 ml   Output 850 ml   Net -610 ml      Physical Exam   Constitutional: He is oriented to person, place, and time. He appears well-developed and well-nourished.   HENT:   Head: Normocephalic and atraumatic.   Cardiovascular: Normal rate and regular rhythm.   Pulmonary/Chest: Effort normal.   Neurological: He is alert and oriented to person, place, and time.   Psychiatric: He has a normal mood and affect. His behavior is normal.   Vitals reviewed.      Significant Labs:   Lipase:   Recent Labs   Lab 03/14/19  0527 03/15/19  0500   LIPASE 407* 550*     POCT Glucose:   Recent Labs   Lab 03/15/19  0748 03/15/19  1209 03/15/19  1615   POCTGLUCOSE 126* 221* 207*       Significant Imaging: I have reviewed and interpreted all pertinent imaging results/findings within the past 24 hours.

## 2019-03-16 NOTE — PROGRESS NOTES
[facility] Hospital Medicine  Progress Note    Patient Name: Cali Mabry  MRN: [id number]  Patient Class: IP- Inpatient   Admission Date: 3/12/2019  Length of Stay: 3 days  Attending Physician: Shawnee Nicholas MD  Primary Care Provider: Stefan Hung MD        Subjective:     Principal Problem:Diabetic ketoacidosis    HPI:  Mr. Mabry is a 52 yo M who presents to the hospital stating he has not felt well for the past 2 days with N/V. Patient is known insulin dependent diabetic. He is also a know IV drug user. His mother notes issues of compliance with his insulin. He presents in DKA with AG of 28 and blood sugar > 700.  Patient is somewhat altered and most of the history is from the patient's mother. The patient presents with multiple electrolyte disorders, hypothermic and in acute renal failure (normal renal function 2018). The patient was started on an insulin and Hc03 drip and will be going to the ICU.  Cause of DKA could be from non-compliance, infection or possibly acute pancreatitis.     Hospital Course:  Mr. Mabry is a 52 yo M who presents to the hospital stating he has not felt well for the past 2 days with N/V. Patient is known insulin dependent diabetic. He is also a know IV drug user. His mother notes issues of compliance with his insulin. He presents in DKA with AG of 28 and blood sugar > 700.  Patient is somewhat altered and most of the history is from the patient's mother. The patient presents with multiple electrolyte disorders, hypothermic and in acute renal failure (normal renal function 2018). Patient was started on an insulin drip and sent to the ICU. Patient clinically improved over 24 hours. Blood cultures were NG.  Vanc stopped.  Patient started on diet and home insulin regimen. The patient was transferred to the floor on 3/13.     3/14- pt had elevated lipase and abdominal pain. Held NPO and given IVF for pancreatitis. Toradol given.   3/15- advance to liquid  diet and if stable dc tomorrow     Interval History: pt asking for pain meds, explained that with drug history will not receive     Review of Systems   Constitutional: Negative for activity change.   HENT: Negative for congestion.    Respiratory: Negative for chest tightness and shortness of breath.    Cardiovascular: Negative for chest pain.   Gastrointestinal: Negative for abdominal pain.   Genitourinary: Negative for difficulty urinating.   Musculoskeletal: Negative for arthralgias.     Objective:     Vital Signs (Most Recent):  Temp: 99.2 °F (37.3 °C) (03/15/19 1956)  Pulse: (!) 57 (03/15/19 1956)  Resp: 18 (03/15/19 1956)  BP: (!) 149/80 (03/15/19 1956)  SpO2: 97 % (03/15/19 1956) Vital Signs (24h Range):  Temp:  [98.1 °F (36.7 °C)-99.2 °F (37.3 °C)] 99.2 °F (37.3 °C)  Pulse:  [56-64] 57  Resp:  [18] 18  SpO2:  [97 %-99 %] 97 %  BP: (122-151)/(67-81) 149/80     Weight: 53.1 kg (117 lb)  Body mass index is 18.32 kg/m².    Intake/Output Summary (Last 24 hours) at 3/15/2019 2120  Last data filed at 3/15/2019 1800  Gross per 24 hour   Intake 240 ml   Output 850 ml   Net -610 ml      Physical Exam   Constitutional: He is oriented to person, place, and time. He appears well-developed and well-nourished.   HENT:   Head: Normocephalic and atraumatic.   Cardiovascular: Normal rate and regular rhythm.   Pulmonary/Chest: Effort normal.   Neurological: He is alert and oriented to person, place, and time.   Psychiatric: He has a normal mood and affect. His behavior is normal.   Vitals reviewed.      Significant Labs:   Lipase:   Recent Labs   Lab 03/14/19  0527 03/15/19  0500   LIPASE 407* 550*     POCT Glucose:   Recent Labs   Lab 03/15/19  0748 03/15/19  1209 03/15/19  1615   POCTGLUCOSE 126* 221* 207*       Significant Imaging: I have reviewed and interpreted all pertinent imaging results/findings within the past 24 hours.    Assessment/Plan:      * Diabetic ketoacidosis    Very severe. And very ill patient.  Reasons  could be infection from IV drug use, pancreatitis, and/or compliance issues. Insulin drip and Hc03 for now.  Will cover with Vanc and Zosyn given critical nature of patient.  Will check blood cultures. IV insulin, fluids. BMP every 4 hours.      Resolved. D/C insulin drip. Start on home insulin therapy.  A1c        IV drug user    With heroin. Will watch for withdrawal. Not sure of last use.          Hypothermia    From acute illness. Warming blanket     Resolved.        Hyperkalemia    Should correct with fluids and Hc03. Repeat BMP in 4 hours     Resolved.        Acute renal failure    From above and from N/V. IV fluids.  BMP every 4.  Patient had normal function in 2018. Suspect will all correct with IV fluids.     Resolved.          Heroin abuse    Has been in Rehab in the past. Will consult  for out patient drug rehab resources.          Tobacco abuse    Smoking cessation education when appropriate        Chronic relapsing pancreatitis    May be cause of patient's abdominal pain. Lipase just a little elevated- in setting of volume contraction- may be falsely high.  Consider CT abdomen. NPO        Chronic hepatitis C    No acute issues.        Anemia of chronic disease    No acute issues        Intravenous drug abuse    Blood cultures are NG. Will d/c Vanc.        Hyperlipidemia    Resume home meds when able.        Acute on chronic pancreatitis    Possible.  Abdominal pain resolved mainly.  Start on diet.   His mother denied ETOH use          Essential hypertension    Resume home meds when able.        Type 2 diabetes mellitus, uncontrolled    Will check an A1c. Insulin drip for now.  No known manifestations          VTE Risk Mitigation (From admission, onward)        Ordered     enoxaparin injection 40 mg  Daily      03/13/19 1058     IP VTE LOW RISK PATIENT  Once      03/12/19 1614              Shawnee Nicholas MD  Department of Hospital Medicine   [facility]

## 2019-03-16 NOTE — PLAN OF CARE
Problem: Fall Injury Risk  Goal: Absence of Fall and Fall-Related Injury    Intervention: Identify and Manage Contributors to Fall Injury Risk   03/16/19 0318   Manage Acute Allergic Reaction   Medication Review/Management medications reviewed   Identify and Manage Contributors to Fall Injury Risk   Self-Care Promotion independence encouraged;BADL personal objects within reach;BADL personal routines maintained     Intervention: Promote Injury-Free Environment   03/16/19 0318   Optimize [place] and Functional Mobility   Environmental Safety Modification assistive device/personal items within reach;lighting adjusted;clutter free environment maintained;room near unit station;room organization consistent   Optimize Balance and Safe Activity   Safety Promotion/Fall Prevention assistive device/personal item within reach;bed alarm set;commode/urinal/bedpan at bedside;Fall Risk reviewed with patient/family;Fall Risk signage in place;lighting adjusted;medications reviewed;nonskid shoes/socks when out of bed;room near unit station;/camera at bedside;side rails raised x 2;instructed to call staff for mobility         Problem: Adult Inpatient Plan of Care  Goal: Plan of Care Review  Outcome: Ongoing (interventions implemented as appropriate)   03/16/19 0318   Plan of Care Review   Plan of Care Reviewed With patient       Problem: Diabetes Comorbidity  Goal: Blood Glucose Level Within Desired Range  Outcome: Ongoing (interventions implemented as appropriate)  Intervention: Maintain Glycemic Control   03/16/19 0318   Monitor and Manage Ketoacidosis   Glycemic Management blood glucose monitoring;other (see comments)  (scheduled insulin given)

## 2019-03-16 NOTE — ASSESSMENT & PLAN NOTE
Possible.  Abdominal pain resolved mainly.  Start on diet.   His mother denied ETOH use     Advance to full liquid diet

## 2019-03-17 VITALS
HEIGHT: 67 IN | TEMPERATURE: 98 F | SYSTOLIC BLOOD PRESSURE: 135 MMHG | WEIGHT: 117 LBS | BODY MASS INDEX: 18.36 KG/M2 | DIASTOLIC BLOOD PRESSURE: 69 MMHG | RESPIRATION RATE: 18 BRPM | OXYGEN SATURATION: 99 % | HEART RATE: 71 BPM

## 2019-03-17 PROBLEM — T68.XXXA HYPOTHERMIA: Status: RESOLVED | Noted: 2019-03-12 | Resolved: 2019-03-17

## 2019-03-17 PROBLEM — E87.5 HYPERKALEMIA: Status: RESOLVED | Noted: 2019-03-12 | Resolved: 2019-03-17

## 2019-03-17 PROBLEM — N17.9 ACUTE RENAL FAILURE: Status: RESOLVED | Noted: 2019-03-12 | Resolved: 2019-03-17

## 2019-03-17 LAB
BACTERIA BLD CULT: NORMAL
BACTERIA BLD CULT: NORMAL
BASOPHILS # BLD AUTO: 0.01 K/UL
BASOPHILS NFR BLD: 0.2 %
DIFFERENTIAL METHOD: ABNORMAL
EOSINOPHIL # BLD AUTO: 0.1 K/UL
EOSINOPHIL NFR BLD: 2.8 %
ERYTHROCYTE [DISTWIDTH] IN BLOOD BY AUTOMATED COUNT: 13.9 %
HCT VFR BLD AUTO: 37.5 %
HGB BLD-MCNC: 12 G/DL
LIPASE SERPL-CCNC: 296 U/L
LYMPHOCYTES # BLD AUTO: 1.6 K/UL
LYMPHOCYTES NFR BLD: 33.9 %
MCH RBC QN AUTO: 26.8 PG
MCHC RBC AUTO-ENTMCNC: 32 G/DL
MCV RBC AUTO: 84 FL
MONOCYTES # BLD AUTO: 0.5 K/UL
MONOCYTES NFR BLD: 11.7 %
NEUTROPHILS # BLD AUTO: 2.4 K/UL
NEUTROPHILS NFR BLD: 51.4 %
PLATELET # BLD AUTO: 174 K/UL
PMV BLD AUTO: 9.5 FL
POCT GLUCOSE: 120 MG/DL (ref 70–110)
POCT GLUCOSE: 85 MG/DL (ref 70–110)
RBC # BLD AUTO: 4.47 M/UL
WBC # BLD AUTO: 4.6 K/UL

## 2019-03-17 PROCEDURE — 25000003 PHARM REV CODE 250: Performed by: HOSPITALIST

## 2019-03-17 PROCEDURE — S0028 INJECTION, FAMOTIDINE, 20 MG: HCPCS | Performed by: HOSPITALIST

## 2019-03-17 PROCEDURE — 63600175 PHARM REV CODE 636 W HCPCS: Performed by: HOSPITALIST

## 2019-03-17 PROCEDURE — S4991 NICOTINE PATCH NONLEGEND: HCPCS | Performed by: HOSPITALIST

## 2019-03-17 PROCEDURE — 83690 ASSAY OF LIPASE: CPT

## 2019-03-17 PROCEDURE — 85025 COMPLETE CBC W/AUTO DIFF WBC: CPT

## 2019-03-17 PROCEDURE — 36415 COLL VENOUS BLD VENIPUNCTURE: CPT

## 2019-03-17 RX ADMIN — NICOTINE 1 PATCH: 21 PATCH, EXTENDED RELEASE TRANSDERMAL at 09:03

## 2019-03-17 RX ADMIN — SODIUM CHLORIDE, SODIUM LACTATE, POTASSIUM CHLORIDE, AND CALCIUM CHLORIDE: .6; .31; .03; .02 INJECTION, SOLUTION INTRAVENOUS at 06:03

## 2019-03-17 RX ADMIN — FAMOTIDINE 20 MG: 10 INJECTION INTRAVENOUS at 09:03

## 2019-03-17 RX ADMIN — KETOROLAC TROMETHAMINE 30 MG: 30 INJECTION, SOLUTION INTRAMUSCULAR; INTRAVENOUS at 10:03

## 2019-03-17 NOTE — NURSING
"Female visitor (Patient stated she was his sister) to come to Patient's room after visiting hours. Call received from telesitter asking if Patient's sister was allowed to assist Patient out of bed to go to bathroom. Patient's bed alarm also sounding and MARIA D Castro to go to Patient's room. Patient was in bathroom with door closed. MARIA D Castro stated that female was walking around room and stated to PCT that she was coming to pick him up in the morning that he was being released in the morning and she wanted to know what time he was going to be released. MARIA D Castro and telesitter stated that female was speaking foreign language and telesitter asked her to translate. Female visitor left while Patient was in bathroom. PCT was attempting to get Patient off of commode and this nurse to come to room to find Patient sitting on commode with plastic spoon under arm. Patient had not used bathroom or flushed commode. Patient exhibiting bizarre behavior and eyes rolling back in head at times. Pupils pinpoint. Patient not making any sense. Patient was asked per this nurse if he sister had brought him drugs and Patient stated "yeah, she brought me a 50 pound bag of toilet paper" and laughed.  Patient stated several times that she had brought him drugs but never did state what it was. Patient made odd grunting noises at times and was also singing jingle bells and some other songs that did not make sense. He was making hand gestures to face and moving oddly. Security called and arrived to assist. Ana, House Supervisor on floor and notified. RITA Mccormack Charge Nurse notified. Dr. Wynn notified no new orders. Will monitor Patient.  "

## 2019-03-17 NOTE — NURSING
Alerted by nursing staff that pt's sister may have supplied pt with narcotics.  Pt was seen disconnecting IV and entering the bathroom, followed by a definitive alteration in mental status.  Security notified and visitors have been restricted for remainder of shift.  Dr. Wynn notified by this nurse

## 2019-03-17 NOTE — NURSING
D/c instructions given regarding follow up appt, medications as ordered. Pt stated understanding. Pt in no distress. IV's d/c'd, pt tolerated well. Rescued to wait for transport for d/c, pt walked self out.

## 2019-03-17 NOTE — PLAN OF CARE
Problem: Fall Injury Risk  Goal: Absence of Fall and Fall-Related Injury  Outcome: Ongoing (interventions implemented as appropriate)  Intervention: Identify and Manage Contributors to Fall Injury Risk   03/17/19 0506   Manage Acute Allergic Reaction   Medication Review/Management medications reviewed   Identify and Manage Contributors to Fall Injury Risk   Self-Care Promotion BADL personal objects within reach;BADL personal routines maintained     Intervention: Promote Injury-Free Environment   03/17/19 0506   Optimize [place] and Functional Mobility   Environmental Safety Modification assistive device/personal items within reach;clutter free environment maintained;lighting adjusted;room near unit station;room organization consistent   Optimize Balance and Safe Activity   Safety Promotion/Fall Prevention assistive device/personal item within reach;bed alarm set;commode/urinal/bedpan at bedside;Fall Risk reviewed with patient/family;Fall Risk signage in place;lighting adjusted;medications reviewed;nonskid shoes/socks when out of bed;room near unit station;/camera at bedside;side rails raised x 3;instructed to call staff for mobility         Problem: Adult Inpatient Plan of Care  Goal: Plan of Care Review  Outcome: Ongoing (interventions implemented as appropriate)   03/17/19 0506   Plan of Care Review   Plan of Care Reviewed With patient

## 2019-03-17 NOTE — PLAN OF CARE
Discharge follow-up instructions provided to patient. SW informed nurse Aditi  completed all discharge planning for patient and is clear to discharge from SW standpoint.         03/17/19 1056   Final Note   Assessment Type Final Discharge Note   Anticipated Discharge Disposition Home   Hospital Follow Up  Appt(s) scheduled? Yes   Right Care Referral Info   Post Acute Recommendation No Care

## 2019-03-17 NOTE — NURSING
"Patient asking to get IV out stating he had to leave, stated he had to go home for an emergency. Patient would not divulge what the emergency was. Patient notified that if he left it would be AMA. Patient to then state "well can I leave for 30 minutes and then come back". This nurse to inform Patient that if he left he would still be leaving AMA. Patient then to state "well can I go outside with my sister and smoke a cigarette?" Patient informed facility was a non-smoking facility. Patient stated "well I can go downstairs with my sister and smoke in the car". Patient informed that if he left the floor that health and safety would be at risk. Patient then to ask this nurse to go to bathroom and Patient was assisted to bathroom. This nurse to remain with Patient while he was out of bed. Patient to use bathroom and assisted back to bed with alarms set. Call light placed in reach.  "

## 2019-03-17 NOTE — PT/OT/SLP PROGRESS
Missed Physical Therapy      Patient Name:  Cali Mabry   MRN:  [id number]    Patient not seen today secondary to Patient unwilling to participate(- patient request therapy for different day 2* limited rest last night. ).     Nurse Aditi present at time of patient refusal.      Will follow-up at later day.    Laz Suresh, PT   03/17/2019

## 2019-03-17 NOTE — NURSING
Patient anxious about wallet missing. Per Kendal, RN - Patient to call mother who informed Patient that his wallet was at home.

## 2019-03-17 NOTE — PROGRESS NOTES
Follow-up Information     Stefan Hung MD. Go on 3/25/2019.    Specialty:  Family Medicine  Why:  Outpatient Services, Primary Care Follow-up Appointment, Please arrive to clinic for 1:00PM  Contact information:  [place] [id number]  [phone]                   [facility]    WRITTEN HEALTHCARE AND DISCHARGE INFORMATION                        Help at Home           [phone]  After discharge for assistance [facility] On Call Nurse Care Line 24/7  Assistance    Things You are responsible For To Manage Your Care At Home:  1.    Getting your prescriptions filled   2.    Taking your medications as directed, DO NOT MISS ANY DOSES!  3.    Going to your follow-up doctor appointment. This is important because it  allow the doctor to monitor your progress and determine if  any changes need to made to your treatment plan.     Thank you for choosing [facility] for your care.  Please answer any calls you may receive from [facility] we want to continue to support you as you manage your healthcare needs. [facility] is happy to have the opportunity to serve you.     Sincerely,  Your [facility] Team,  Sudha Phoenix LMSW   II  [phone]

## 2019-03-17 NOTE — DISCHARGE SUMMARY
[facility] Hospital Medicine  Discharge Summary      Patient Name: Cali Mabry  MRN: [id number]  Admission Date: 3/12/2019  Hospital Length of Stay: 5 days  Discharge Date and Time:  03/17/2019 11:00 AM  Attending Physician: Shawnee Nicholas MD   Discharging Provider: Shawnee Nicholas MD  Primary Care Provider: Stefan Hung MD      HPI:   Mr. Mabry is a 54 yo M who presents to the hospital stating he has not felt well for the past 2 days with N/V. Patient is known insulin dependent diabetic. He is also a know IV drug user. His mother notes issues of compliance with his insulin. He presents in DKA with AG of 28 and blood sugar > 700.  Patient is somewhat altered and most of the history is from the patient's mother. The patient presents with multiple electrolyte disorders, hypothermic and in acute renal failure (normal renal function 2018). The patient was started on an insulin and Hc03 drip and will be going to the ICU.  Cause of DKA could be from non-compliance, infection or possibly acute pancreatitis.     * No surgery found *      Hospital Course:   Mr. Mabry is a 54 yo M who presents to the hospital stating he has not felt well for the past 2 days with N/V. Patient is known insulin dependent diabetic. He is also a know IV drug user. His mother notes issues of compliance with his insulin. He presents in DKA with AG of 28 and blood sugar > 700.  Patient is somewhat altered and most of the history is from the patient's mother. The patient presents with multiple electrolyte disorders, hypothermic and in acute renal failure (normal renal function 2018). Patient was started on an insulin drip and sent to the ICU. Patient clinically improved over 24 hours. Blood cultures were NG.  Vanc stopped.  Patient started on diet and home insulin regimen. The patient was transferred to the floor on 3/13.     3/14- pt had elevated lipase and abdominal pain. Held NPO and given IVF for  "pancreatitis. Toradol given.   3/15- advance to liquid diet and if stable dc tomorrow   3/16- advance to full liquids, blood sugars dropped to 30s, scaled back insulin, monitor   3/17- pt apparently may have taken narcotic brought in by "sister" last night. Pt with odd behavior this am. Lipase trending down, BS stable and ok for dc home. Counseled on diet diabetic/bland. Take insulin as prescribed and of course, get help with drug abuse. Pt not serious about quitting yet. Anticipate future admissions for non-compliance.      Consults:   Consults (From admission, onward)        Status Ordering Provider     Inpatient consult to Cardiology  Once     Provider:  Glen Fisher MD    Acknowledged NAN TAVARES     Inpatient consult to Pulmonology  Once     Provider:  Brinda Rene MD    Completed NAN TAVARES     Inpatient consult to Social Work  Once     Provider:  (Not yet assigned)    Completed NAN TAVARES          No new Assessment & Plan notes have been filed under this hospital service since the last note was generated.  Service: Hospital Medicine    Final Active Diagnoses:    Diagnosis Date Noted POA    Elevated troponin [R74.8] 03/15/2019 No    Heroin abuse [F11.10] 08/05/2018 Yes    Anemia of chronic disease [D63.8] 11/23/2017 Yes     Chronic    Chronic hepatitis C [B18.2] 11/23/2017 Yes     Chronic    Chronic relapsing pancreatitis [K86.1] 11/23/2017 Yes     Chronic    Hyperlipidemia [E78.5] 11/23/2017 Yes     Chronic    Tobacco abuse [Z72.0] 11/23/2017 Yes     Chronic    Intravenous drug abuse [F19.10] 11/23/2017 Yes     Chronic    Essential hypertension [I10] 04/03/2016 Yes     Chronic    Type 2 diabetes mellitus, uncontrolled [E11.65] 04/03/2016 Yes     Chronic    Acute on chronic pancreatitis [K85.90, K86.1] 04/03/2016 Yes    Pancreatitis, chronic [K86.1] 04/03/2016 Yes      Problems Resolved During this Admission:    Diagnosis Date Noted Date Resolved POA    " PRINCIPAL PROBLEM:  Diabetic ketoacidosis [E13.10] 07/22/2016 03/17/2019 Yes    Acute renal failure [N17.9] 03/12/2019 03/17/2019 Yes    Hyperkalemia [E87.5] 03/12/2019 03/17/2019 Yes    Hypothermia [T68.XXXA] 03/12/2019 03/17/2019 Yes       Discharged Condition: good    Disposition: Home or Self Care    Follow Up:  Follow-up Information     Stefan Hung MD. Go on 3/25/2019.    Specialty:  Family Medicine  Why:  Outpatient Services, Primary Care Follow-up Appointment, Please arrive to clinic for 1:00PM  Contact information:  [place] [address]  [phone]                 Patient Instructions:      Diet Cardiac     Notify your health care provider if you experience any of the following:  temperature >100.4     Activity as tolerated       Significant Diagnostic Studies: Abdominal CT:  Impression       1. Although suboptimally evaluated given the lack of IV/oral contrast and paucity of intraperitoneal bowel fat, there appears to be mild/moderate diffuse edematous enlargement of the pancreatic parenchyma with surrounding fat stranding and free fluid concerning for acute pancreatitis.  2. Diffusely decreased hepatic parenchymal attenuation, borderline splenomegaly, small amount of ascites and mild smooth concentric mural thickening of the gallbladder, stomach and colon in the setting of patient with known chronic HCV suggest portal hypertension with congestive cholecystopathy, gastropathy and colopathy.  3. Left lower lobe tree-in-bud airspace opacities which is most commonly seen in the setting of infectious bronchiolitis.         Pending Diagnostic Studies:     None         Medications:  Reconciled Home Medications:      Medication List      CONTINUE taking these medications    atorvastatin 40 MG tablet  Commonly known as:  LIPITOR  Take 40 mg by mouth once daily.     ibuprofen 600 MG tablet  Commonly known as:  ADVIL,MOTRIN  Take 1 tablet (600 mg total) by mouth every 6 (six) hours as needed for  Pain.     LANTUS U-100 INSULIN 100 unit/mL injection  Generic drug:  insulin glargine  Inject 15 Units into the skin 2 (two) times daily.     nicotine 21 mg/24 hr  Commonly known as:  NICODERM CQ  Place 1 patch onto the skin once daily.     omeprazole 40 MG capsule  Commonly known as:  PriLOSEC  Take 1 capsule (40 mg total) by mouth once daily.        STOP taking these medications    gabapentin 300 MG capsule  Commonly known as:  NEURONTIN     insulin aspart U-100 100 unit/mL Inpn pen  Commonly known as:  NovoLOG            Indwelling Lines/Drains at time of discharge:   Lines/Drains/Airways          None          Time spent on the discharge of patient: 30 minutes  Patient was seen and examined on the date of discharge and determined to be suitable for discharge.         Shawnee Nicholas MD  Department of Hospital Medicine  [facility]

## 2019-04-13 ENCOUNTER — HOSPITAL ENCOUNTER (INPATIENT)
Facility: HOSPITAL | Age: 54
LOS: 3 days | Discharge: HOME | DRG: 638 | End: 2019-04-16
Attending: EMERGENCY MEDICINE | Admitting: INTERNAL MEDICINE
Payer: MEDICARE
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
## 2019-04-13 DIAGNOSIS — D72.828 OTHER ELEVATED WHITE BLOOD CELL (WBC) COUNT: ICD-10-CM

## 2019-04-13 DIAGNOSIS — E86.0 DEHYDRATION: ICD-10-CM

## 2019-04-13 DIAGNOSIS — N17.8 ACUTE RENAL FAILURE WITH OTHER SPECIFIED PATHOLOGICAL LESION IN KIDNEY: ICD-10-CM

## 2019-04-13 DIAGNOSIS — E10.11 DIABETIC KETOACIDOSIS WITH COMA ASSOCIATED WITH TYPE 1 DIABETES MELLITUS: Primary | ICD-10-CM

## 2019-04-13 DIAGNOSIS — F19.10 POLYSUBSTANCE ABUSE: ICD-10-CM

## 2019-04-13 DIAGNOSIS — R41.82 ALTERED MENTAL STATUS, UNSPECIFIED ALTERED MENTAL STATUS TYPE: ICD-10-CM

## 2019-04-13 DIAGNOSIS — E86.1 HYPOTENSION DUE TO HYPOVOLEMIA: ICD-10-CM

## 2019-04-13 PROBLEM — E87.1 HYPONATREMIA: Status: ACTIVE | Noted: 2019-04-13

## 2019-04-13 PROBLEM — E87.20 ACIDOSIS: Status: ACTIVE | Noted: 2019-04-13

## 2019-04-13 PROBLEM — E11.10 DKA (DIABETIC KETOACIDOSES): Status: ACTIVE | Noted: 2019-04-13

## 2019-04-13 PROBLEM — R91.1 PULMONARY NODULE: Status: ACTIVE | Noted: 2019-04-13

## 2019-04-13 PROBLEM — E87.5 HYPERKALEMIA: Status: ACTIVE | Noted: 2019-04-13

## 2019-04-13 PROBLEM — N17.9 ACUTE RENAL FAILURE: Status: ACTIVE | Noted: 2019-04-13

## 2019-04-13 LAB
ALBUMIN SERPL BCP-MCNC: 3.1 G/DL (ref 3.5–5.2)
ALLENS TEST: ABNORMAL
ALP SERPL-CCNC: 127 U/L (ref 55–135)
ALT SERPL W/O P-5'-P-CCNC: 64 U/L (ref 10–44)
AMMONIA PLAS-SCNC: 59 UMOL/L (ref 10–50)
AMPHET+METHAMPHET UR QL: NEGATIVE
ANION GAP SERPL CALC-SCNC: 15 MMOL/L (ref 8–16)
ANION GAP SERPL CALC-SCNC: 18 MMOL/L (ref 8–16)
ANION GAP SERPL CALC-SCNC: 25 MMOL/L (ref 8–16)
ANION GAP SERPL CALC-SCNC: ABNORMAL MMOL/L (ref 8–16)
ANISOCYTOSIS BLD QL SMEAR: SLIGHT
APAP SERPL-MCNC: <3 UG/ML (ref 10–20)
AST SERPL-CCNC: 22 U/L (ref 10–40)
B-OH-BUTYR BLD STRIP-SCNC: 5.9 MMOL/L (ref 0–0.5)
BACTERIA #/AREA URNS HPF: ABNORMAL /HPF
BARBITURATES UR QL SCN>200 NG/ML: NEGATIVE
BASOPHILS # BLD AUTO: ABNORMAL K/UL (ref 0–0.2)
BASOPHILS NFR BLD: 0 % (ref 0–1.9)
BENZODIAZ UR QL SCN>200 NG/ML: ABNORMAL
BILIRUB SERPL-MCNC: 0.2 MG/DL (ref 0.1–1)
BILIRUB UR QL STRIP: NEGATIVE
BUN SERPL-MCNC: 41 MG/DL (ref 6–20)
BUN SERPL-MCNC: 47 MG/DL (ref 6–20)
BUN SERPL-MCNC: 55 MG/DL (ref 6–20)
BUN SERPL-MCNC: 61 MG/DL (ref 6–20)
BZE UR QL SCN: NEGATIVE
CALCIUM SERPL-MCNC: 8.4 MG/DL (ref 8.7–10.5)
CALCIUM SERPL-MCNC: 8.5 MG/DL (ref 8.7–10.5)
CALCIUM SERPL-MCNC: 8.6 MG/DL (ref 8.7–10.5)
CALCIUM SERPL-MCNC: 8.9 MG/DL (ref 8.7–10.5)
CANNABINOIDS UR QL SCN: NEGATIVE
CHLORIDE SERPL-SCNC: 109 MMOL/L (ref 95–110)
CHLORIDE SERPL-SCNC: 116 MMOL/L (ref 95–110)
CHLORIDE SERPL-SCNC: 88 MMOL/L (ref 95–110)
CHLORIDE SERPL-SCNC: 98 MMOL/L (ref 95–110)
CK SERPL-CCNC: 28 U/L (ref 20–200)
CLARITY UR: CLEAR
CO2 SERPL-SCNC: 15 MMOL/L (ref 23–29)
CO2 SERPL-SCNC: 18 MMOL/L (ref 23–29)
CO2 SERPL-SCNC: 9 MMOL/L (ref 23–29)
CO2 SERPL-SCNC: <5 MMOL/L (ref 23–29)
COLOR UR: YELLOW
CREAT SERPL-MCNC: 2.1 MG/DL (ref 0.5–1.4)
CREAT SERPL-MCNC: 2.6 MG/DL (ref 0.5–1.4)
CREAT SERPL-MCNC: 3.2 MG/DL (ref 0.5–1.4)
CREAT SERPL-MCNC: 3.4 MG/DL (ref 0.5–1.4)
CREAT UR-MCNC: 20 MG/DL (ref 23–375)
DELSYS: ABNORMAL
DIFFERENTIAL METHOD: ABNORMAL
EOSINOPHIL # BLD AUTO: ABNORMAL K/UL (ref 0–0.5)
EOSINOPHIL NFR BLD: 0 % (ref 0–8)
ERYTHROCYTE [DISTWIDTH] IN BLOOD BY AUTOMATED COUNT: 14.7 % (ref 11.5–14.5)
EST. GFR  (AFRICAN AMERICAN): 23 ML/MIN/1.73 M^2
EST. GFR  (AFRICAN AMERICAN): 24 ML/MIN/1.73 M^2
EST. GFR  (AFRICAN AMERICAN): 31 ML/MIN/1.73 M^2
EST. GFR  (AFRICAN AMERICAN): 41 ML/MIN/1.73 M^2
EST. GFR  (NON AFRICAN AMERICAN): 20 ML/MIN/1.73 M^2
EST. GFR  (NON AFRICAN AMERICAN): 21 ML/MIN/1.73 M^2
EST. GFR  (NON AFRICAN AMERICAN): 27 ML/MIN/1.73 M^2
EST. GFR  (NON AFRICAN AMERICAN): 35 ML/MIN/1.73 M^2
FIO2: 21
GLUCOSE SERPL-MCNC: 1327 MG/DL (ref 70–110)
GLUCOSE SERPL-MCNC: 1679 MG/DL (ref 70–110)
GLUCOSE SERPL-MCNC: 368 MG/DL (ref 70–110)
GLUCOSE SERPL-MCNC: 751 MG/DL (ref 70–110)
GLUCOSE UR QL STRIP: ABNORMAL
HCO3 UR-SCNC: 8.6 MMOL/L (ref 24–28)
HCT VFR BLD AUTO: 45.4 % (ref 40–54)
HGB BLD-MCNC: 11 G/DL (ref 14–18)
HGB UR QL STRIP: ABNORMAL
INR PPP: 1.1 (ref 0.8–1.2)
KETONES UR QL STRIP: ABNORMAL
LACTATE SERPL-SCNC: 8.5 MMOL/L (ref 0.5–2.2)
LEUKOCYTE ESTERASE UR QL STRIP: NEGATIVE
LIPASE SERPL-CCNC: 254 U/L (ref 4–60)
LYMPHOCYTES # BLD AUTO: ABNORMAL K/UL (ref 1–4.8)
LYMPHOCYTES NFR BLD: 7 % (ref 18–48)
MAGNESIUM SERPL-MCNC: 2.8 MG/DL (ref 1.6–2.6)
MCH RBC QN AUTO: 26.8 PG (ref 27–31)
MCHC RBC AUTO-ENTMCNC: 24.2 G/DL (ref 32–36)
MCV RBC AUTO: 111 FL (ref 82–98)
METAMYELOCYTES NFR BLD MANUAL: 3 %
METHADONE UR QL SCN>300 NG/ML: NEGATIVE
MICROSCOPIC COMMENT: ABNORMAL
MODE: ABNORMAL
MONOCYTES # BLD AUTO: ABNORMAL K/UL (ref 0.3–1)
MONOCYTES NFR BLD: 4 % (ref 4–15)
NEUTROPHILS NFR BLD: 71 % (ref 38–73)
NEUTS BAND NFR BLD MANUAL: 15 %
NITRITE UR QL STRIP: NEGATIVE
OPIATES UR QL SCN: ABNORMAL
PCO2 BLDA: 25.9 MMHG (ref 35–45)
PCP UR QL SCN>25 NG/ML: NEGATIVE
PH SMN: 7.13 [PH] (ref 7.35–7.45)
PH UR STRIP: 5 [PH] (ref 5–8)
PHOSPHATE SERPL-MCNC: 6.7 MG/DL (ref 2.7–4.5)
PLATELET # BLD AUTO: 451 K/UL (ref 150–350)
PLATELET BLD QL SMEAR: ABNORMAL
PMV BLD AUTO: 11.1 FL (ref 9.2–12.9)
PO2 BLDA: 106 MMHG (ref 80–100)
POC BE: -19 MMOL/L
POC SATURATED O2: 96 % (ref 95–100)
POC TCO2: 9 MMOL/L (ref 23–27)
POCT GLUCOSE: 242 MG/DL (ref 70–110)
POCT GLUCOSE: 254 MG/DL (ref 70–110)
POCT GLUCOSE: 417 MG/DL (ref 70–110)
POCT GLUCOSE: 455 MG/DL (ref 70–110)
POTASSIUM SERPL-SCNC: 3.5 MMOL/L (ref 3.5–5.1)
POTASSIUM SERPL-SCNC: 4.2 MMOL/L (ref 3.5–5.1)
POTASSIUM SERPL-SCNC: 5.3 MMOL/L (ref 3.5–5.1)
POTASSIUM SERPL-SCNC: 7.3 MMOL/L (ref 3.5–5.1)
PROT SERPL-MCNC: 7 G/DL (ref 6–8.4)
PROT UR QL STRIP: NEGATIVE
PROTHROMBIN TIME: 11.1 SEC (ref 9–12.5)
RBC # BLD AUTO: 4.1 M/UL (ref 4.6–6.2)
RBC #/AREA URNS HPF: 8 /HPF (ref 0–4)
SALICYLATES SERPL-MCNC: <5 MG/DL (ref 15–30)
SAMPLE: ABNORMAL
SITE: ABNORMAL
SODIUM SERPL-SCNC: 123 MMOL/L (ref 136–145)
SODIUM SERPL-SCNC: 132 MMOL/L (ref 136–145)
SODIUM SERPL-SCNC: 142 MMOL/L (ref 136–145)
SODIUM SERPL-SCNC: 149 MMOL/L (ref 136–145)
SP GR UR STRIP: 1.02 (ref 1–1.03)
SP02: 100
TOXICOLOGY INFORMATION: ABNORMAL
TSH SERPL DL<=0.005 MIU/L-ACNC: 0.93 UIU/ML (ref 0.4–4)
URN SPEC COLLECT METH UR: ABNORMAL
UROBILINOGEN UR STRIP-ACNC: NEGATIVE EU/DL
WBC # BLD AUTO: 22.88 K/UL (ref 3.9–12.7)
WBC #/AREA URNS HPF: 8 /HPF (ref 0–5)
YEAST URNS QL MICRO: ABNORMAL

## 2019-04-13 PROCEDURE — 96376 TX/PRO/DX INJ SAME DRUG ADON: CPT

## 2019-04-13 PROCEDURE — 96361 HYDRATE IV INFUSION ADD-ON: CPT

## 2019-04-13 PROCEDURE — 96375 TX/PRO/DX INJ NEW DRUG ADDON: CPT

## 2019-04-13 PROCEDURE — 25000003 PHARM REV CODE 250: Performed by: INTERNAL MEDICINE

## 2019-04-13 PROCEDURE — 83605 ASSAY OF LACTIC ACID: CPT

## 2019-04-13 PROCEDURE — 93010 ELECTROCARDIOGRAM REPORT: CPT | Mod: ,,, | Performed by: INTERNAL MEDICINE

## 2019-04-13 PROCEDURE — 85007 BL SMEAR W/DIFF WBC COUNT: CPT

## 2019-04-13 PROCEDURE — 25000003 PHARM REV CODE 250: Performed by: EMERGENCY MEDICINE

## 2019-04-13 PROCEDURE — 63600175 PHARM REV CODE 636 W HCPCS: Performed by: INTERNAL MEDICINE

## 2019-04-13 PROCEDURE — 80048 BASIC METABOLIC PNL TOTAL CA: CPT

## 2019-04-13 PROCEDURE — 82010 KETONE BODYS QUAN: CPT

## 2019-04-13 PROCEDURE — 82803 BLOOD GASES ANY COMBINATION: CPT

## 2019-04-13 PROCEDURE — 83735 ASSAY OF MAGNESIUM: CPT

## 2019-04-13 PROCEDURE — 36415 COLL VENOUS BLD VENIPUNCTURE: CPT

## 2019-04-13 PROCEDURE — 99292 CRITICAL CARE ADDL 30 MIN: CPT

## 2019-04-13 PROCEDURE — 80307 DRUG TEST PRSMV CHEM ANLYZR: CPT

## 2019-04-13 PROCEDURE — 85610 PROTHROMBIN TIME: CPT

## 2019-04-13 PROCEDURE — 80048 BASIC METABOLIC PNL TOTAL CA: CPT | Mod: 91

## 2019-04-13 PROCEDURE — 85027 COMPLETE CBC AUTOMATED: CPT

## 2019-04-13 PROCEDURE — 96374 THER/PROPH/DIAG INJ IV PUSH: CPT | Mod: 59

## 2019-04-13 PROCEDURE — 82550 ASSAY OF CK (CPK): CPT

## 2019-04-13 PROCEDURE — 96365 THER/PROPH/DIAG IV INF INIT: CPT

## 2019-04-13 PROCEDURE — 84100 ASSAY OF PHOSPHORUS: CPT

## 2019-04-13 PROCEDURE — 80053 COMPREHEN METABOLIC PANEL: CPT

## 2019-04-13 PROCEDURE — 20000000 HC ICU ROOM

## 2019-04-13 PROCEDURE — 94761 N-INVAS EAR/PLS OXIMETRY MLT: CPT

## 2019-04-13 PROCEDURE — 80329 ANALGESICS NON-OPIOID 1 OR 2: CPT

## 2019-04-13 PROCEDURE — 99291 CRITICAL CARE FIRST HOUR: CPT | Mod: 25

## 2019-04-13 PROCEDURE — 82962 GLUCOSE BLOOD TEST: CPT

## 2019-04-13 PROCEDURE — 93010 EKG 12-LEAD: ICD-10-PCS | Mod: ,,, | Performed by: INTERNAL MEDICINE

## 2019-04-13 PROCEDURE — 99900035 HC TECH TIME PER 15 MIN (STAT)

## 2019-04-13 PROCEDURE — 83690 ASSAY OF LIPASE: CPT

## 2019-04-13 PROCEDURE — 82140 ASSAY OF AMMONIA: CPT

## 2019-04-13 PROCEDURE — 36600 WITHDRAWAL OF ARTERIAL BLOOD: CPT

## 2019-04-13 PROCEDURE — P9612 CATHETERIZE FOR URINE SPEC: HCPCS

## 2019-04-13 PROCEDURE — 63600175 PHARM REV CODE 636 W HCPCS: Performed by: EMERGENCY MEDICINE

## 2019-04-13 PROCEDURE — 84443 ASSAY THYROID STIM HORMONE: CPT

## 2019-04-13 PROCEDURE — 93005 ELECTROCARDIOGRAM TRACING: CPT

## 2019-04-13 PROCEDURE — 87040 BLOOD CULTURE FOR BACTERIA: CPT | Mod: 59

## 2019-04-13 PROCEDURE — 81000 URINALYSIS NONAUTO W/SCOPE: CPT | Mod: 59

## 2019-04-13 PROCEDURE — 27000221 HC OXYGEN, UP TO 24 HOURS

## 2019-04-13 PROCEDURE — 96366 THER/PROPH/DIAG IV INF ADDON: CPT

## 2019-04-13 RX ORDER — SODIUM CHLORIDE 9 MG/ML
1000 INJECTION, SOLUTION INTRAVENOUS CONTINUOUS
Status: DISCONTINUED | OUTPATIENT
Start: 2019-04-13 | End: 2019-04-14

## 2019-04-13 RX ORDER — SODIUM BICARBONATE 1 MEQ/ML
50 SYRINGE (ML) INTRAVENOUS
Status: COMPLETED | OUTPATIENT
Start: 2019-04-13 | End: 2019-04-13

## 2019-04-13 RX ORDER — VANCOMYCIN HCL IN 5 % DEXTROSE 1G/250ML
1000 PLASTIC BAG, INJECTION (ML) INTRAVENOUS ONCE
Status: COMPLETED | OUTPATIENT
Start: 2019-04-13 | End: 2019-04-13

## 2019-04-13 RX ORDER — DEXTROSE MONOHYDRATE 100 MG/ML
1000 INJECTION, SOLUTION INTRAVENOUS
Status: DISCONTINUED | OUTPATIENT
Start: 2019-04-13 | End: 2019-04-16 | Stop reason: HOSPADM

## 2019-04-13 RX ORDER — SODIUM CHLORIDE 9 MG/ML
1000 INJECTION, SOLUTION INTRAVENOUS
Status: COMPLETED | OUTPATIENT
Start: 2019-04-13 | End: 2019-04-13

## 2019-04-13 RX ORDER — SODIUM CHLORIDE 0.9 % (FLUSH) 0.9 %
10 SYRINGE (ML) INJECTION
Status: DISCONTINUED | OUTPATIENT
Start: 2019-04-13 | End: 2019-04-15

## 2019-04-13 RX ORDER — DEXTROSE MONOHYDRATE 100 MG/ML
1000 INJECTION, SOLUTION INTRAVENOUS
Status: DISCONTINUED | OUTPATIENT
Start: 2019-04-13 | End: 2019-04-15

## 2019-04-13 RX ADMIN — VANCOMYCIN HYDROCHLORIDE 1000 MG: 1 INJECTION, POWDER, LYOPHILIZED, FOR SOLUTION INTRAVENOUS at 02:04

## 2019-04-13 RX ADMIN — SODIUM CHLORIDE 1000 ML: 0.9 INJECTION, SOLUTION INTRAVENOUS at 03:04

## 2019-04-13 RX ADMIN — SODIUM CHLORIDE 6 UNITS/HR: 9 INJECTION, SOLUTION INTRAVENOUS at 11:04

## 2019-04-13 RX ADMIN — SODIUM CHLORIDE 1000 ML: 0.9 INJECTION, SOLUTION INTRAVENOUS at 10:04

## 2019-04-13 RX ADMIN — SODIUM CHLORIDE 1000 ML: 0.9 INJECTION, SOLUTION INTRAVENOUS at 01:04

## 2019-04-13 RX ADMIN — INSULIN HUMAN 8 UNITS: 100 INJECTION, SOLUTION PARENTERAL at 10:04

## 2019-04-13 RX ADMIN — SODIUM CHLORIDE 10 UNITS/HR: 9 INJECTION, SOLUTION INTRAVENOUS at 07:04

## 2019-04-13 RX ADMIN — SODIUM CHLORIDE 6 UNITS/HR: 9 INJECTION, SOLUTION INTRAVENOUS at 05:04

## 2019-04-13 RX ADMIN — PIPERACILLIN AND TAZOBACTAM 4.5 G: 4; .5 INJECTION, POWDER, LYOPHILIZED, FOR SOLUTION INTRAVENOUS; PARENTERAL at 12:04

## 2019-04-13 RX ADMIN — SODIUM BICARBONATE 50 MEQ: 84 INJECTION INTRAVENOUS at 01:04

## 2019-04-13 NOTE — NURSING
Pt arrived to ICU from ER  via stretcher, very drowsy, insulin gtt at 6u/hr infusing, BMP q4hr, and insuline gtt order set corrected by Dr. Dickinson order,  jean tan in-place, oriented pt and family  to room, call light within reach. Questions an

## 2019-04-13 NOTE — HOSPITAL COURSE
Mr. Mabry is a 54 yo M known to me who has frequent hospitalizations for different issues- the last in March for DKA.  He was evidently found down by his mother and presents to the ED on 4/13/19 by EMS  and found in severe DKA with a sugar close to 1700 with a C02< 5 with a K of 7.3.  The patient presented with AMS and no history was obtained from the patient. The history was obtained by my discussion with the ED staff and reviewing the ED record. He was started on aggressive IV fluids, Insulin drip, broad spectrum Abx (patient is a known IV drug user).  After 2 hours in the ED, the patient became more alert. He is protecting his airway. The case was discussed with pulmonary/critical care, and the patient admitted to the ICU.  The patient was weaned off insulin drip. Labs went to normal.  Patient had some hypoglycemia on 4/15/19 and insulin cut back. A1c 8.6.  Long discussion with patient and his continued IV drug use. He has poor insight.  Patient moved to the floor on 4/16/19.  Has remained afebrile and hemodynamically stable.  Glucose much better controlled.  Discussed importance of compliance with insulin and diabetic diet.  He will be discharged home to follow up with PCP.

## 2019-04-13 NOTE — H&P
"[facility] Hospital Medicine  History & Physical    Patient Name: Cali Mabry  MRN: [id number]  Admission Date: 4/13/2019  Attending Physician: Candice Li MD   Primary Care Provider: Stefan Hung MD         Patient information was obtained from patient and ER records.     Subjective:     Principal Problem:DKA (diabetic ketoacidoses)    Chief Complaint:   Chief Complaint   Patient presents with    Altered Mental Status     Mother found pt unresponsive this am.  Is known Diabetic.  Mom states to EMS that "pt may still do drugs".  EMS states CBG "Hi".  responds to painful stemuli and hypotensive.        HPI: Mr. Mabry is a 54 yo M known to me who has frequent hospitalizations for different issues- the last in March for DKA.  He was evidently found down by his mother and presents to the ED by EMS  and found in severe DKA with a sugar close to 1700 with a C02< 5 with a K of 7.3.  The patient presented with AMS and no history was obtained from the patient. The history was obtained by my discussion with the ED staff and reviewing the ED record. He was started on aggressive IV fluids, Insulin drip, broad spectrum Abx (patient is a known IV drug user).  After 2 hours in the ED, the patient has become more alert. He is protecting his airway. The case was discussed with pulmonary/critical care, and the patient will be sent to the ICU.      Past Medical History:   Diagnosis Date    Diabetes mellitus     Hepatitis     HTN (hypertension)     Pancreatitis        Past Surgical History:   Procedure Laterality Date    CARDIAC SURGERY  1999    stent [facility]. ([facility])    ESOPHAGOGASTRODUODENOSCOPY (EGD) Left 4/4/2016    Performed by [facility]    SHOULDER SURGERY  2013    right shoulder, spur removal.       Review of patient's allergies indicates:  No Known Allergies    No current facility-administered medications on file prior to encounter.      Current " Outpatient Medications on File Prior to Encounter   Medication Sig    atorvastatin (LIPITOR) 40 MG tablet Take 40 mg by mouth once daily.    ibuprofen (ADVIL,MOTRIN) 600 MG tablet Take 1 tablet (600 mg total) by mouth every 6 (six) hours as needed for Pain.    LANTUS 100 unit/mL injection Inject 15 Units into the skin 2 (two) times daily.    nicotine (NICODERM CQ) 21 mg/24 hr Place 1 patch onto the skin once daily.    omeprazole (PRILOSEC) 40 MG capsule Take 1 capsule (40 mg total) by mouth once daily.     Family History     Problem Relation (Age of Onset)    Asthma Mother        Tobacco Use    Smoking status: Current Every Day Smoker     Packs/day: 1.00     Years: 20.00     Pack years: 20.00     Types: Cigarettes     Start date: 4/3/1981    Smokeless tobacco: Never Used   Substance and Sexual Activity    Alcohol use: No     Alcohol/week: 0.0 oz    Drug use: Yes     Types: IV, Marijuana, Heroin     Comment: currently, used today, herion    Sexual activity: Yes     Partners: Female     Review of Systems   Unable to perform ROS: Acuity of condition     Objective:     Vital Signs (Most Recent):  Temp: (!) 93.7 °F (34.3 °C) (04/13/19 1318)  Pulse: 99 (04/13/19 1355)  Resp: (!) 28 (04/13/19 1355)  BP: 116/60 (04/13/19 1309)  SpO2: 100 % (04/13/19 1355) Vital Signs (24h Range):  Temp:  [93.7 °F (34.3 °C)] 93.7 °F (34.3 °C)  Pulse:  [] 99  Resp:  [25-33] 28  SpO2:  [94 %-100 %] 100 %  BP: ()/(46-63) 116/60     Weight: 54.4 kg (120 lb)  Body mass index is 19.97 kg/m².    Physical Exam   Constitutional: He appears well-developed and well-nourished. No distress.   HENT:   Head: Normocephalic and atraumatic.   Pupils are not pin point.    Cardiovascular: Normal rate and regular rhythm. Exam reveals no gallop and no friction rub.   Pulmonary/Chest: Effort normal and breath sounds normal. No stridor. No respiratory distress. He has no wheezes. He has no rales.   Abdominal: Soft. Bowel sounds are normal.  There is no tenderness. There is no rebound and no guarding.   Neurological:   Patient is arrousable but does not follow commands.   Skin: Skin is warm and dry. He is not diaphoretic.   Nursing note and vitals reviewed.          Significant Labs:   BMP:   Recent Labs   Lab 04/13/19  1001   GLU 1,679*   *   K 7.3*   CL 88*   CO2 <5*   BUN 61*   CREATININE 3.4*   CALCIUM 8.5*     CBC:   Recent Labs   Lab 04/13/19  1001   WBC 22.88*   HGB 11.0*   HCT 45.4   *       Significant Imaging:   CT head- no acute changes.  CT spine- no acute changes.   CXR- pulmonary nodule.     Assessment/Plan:     * DKA (diabetic ketoacidoses)  Severe DKA. Sugar close to 1700 with bicarb < 5. Stat ABG. Aggressive IV fluids and Insulin. Repeat stat labs now. BMP every 4 hours.  Pulmonary critical care consulted. Not sure of cause- but suspect non-compliance. Infection on differential as patient is a known IV drug user- started on Vanc and Zosyn for now. Blood cultures ordered.  Patient in critical condition.  Monitor closely.  Clinically seems to be better in that he is becoming more alert. Protecting air way.       Acidosis  Metabolic acidosis- from above. Giving some bicarb in fluids for now. Rechecking stat labs. ABG.  Renal consulted       Type 2 diabetes mellitus, uncontrolled  No known manifestations.       Hyperkalemia  Presents with K of 7.3.  Insulin/bicarb and fluids should help. Repeat stat labs. Has some peaked T waves. Should correct. Continue tele monitoring.      Acute renal failure  From DKA and hyperosmolar state.  IV fluids.  Repeat labs now. Then BMP every 4 hours.       Heroin abuse  Known IV heroin user. Drug screen was + for opioids.  Blood cultures sent. Cover with Vanc and Zosyn for now.       Hyponatremia  From hyperosmolar state. Will improve with fluids.       Pulmonary nodule  Will need follow up.  5mm R upper lobe found on CT. Patient is a smoker.         Tobacco abuse  Smoking cessation education  when appropriate.       Chronic hepatitis C  No acute issues.       Anemia of chronic disease  Stable- will likely drop after aggressive fluids      Hyperlipidemia  Resume home meds when appropriate.       Essential hypertension  No acute issues       Opioid dependence, continuous  Noted.         VTE Risk Mitigation (From admission, onward)    None        Critical patient. Discussed with critical care. Close monitoring in ICU.   Repeat stat labs. Renal consult.      Addendum 3:03pm  Repeat labs much improved. Acidosis improved. K down to 5.3. Continue current care.     Will check on patient again shortly.     Tyrese Singh MD  Department of Hospital Medicine   [facility]

## 2019-04-13 NOTE — ED NOTES
Pt down to CT in stable condition. Unable to perform hourly glucose.  Will perform when pt return.

## 2019-04-13 NOTE — SUBJECTIVE & OBJECTIVE
Past Medical History:   Diagnosis Date    Diabetes mellitus     Hepatitis     HTN (hypertension)     Pancreatitis        Past Surgical History:   Procedure Laterality Date    CARDIAC SURGERY  1999    stent placed. ([facility])    ESOPHAGOGASTRODUODENOSCOPY (EGD) Left 4/4/2016    [facility]    SHOULDER SURGERY  2013    right shoulder, spur removal.       Review of patient's allergies indicates:  No Known Allergies    No current facility-administered medications on file prior to encounter.      Current Outpatient Medications on File Prior to Encounter   Medication Sig    atorvastatin (LIPITOR) 40 MG tablet Take 40 mg by mouth once daily.    ibuprofen (ADVIL,MOTRIN) 600 MG tablet Take 1 tablet (600 mg total) by mouth every 6 (six) hours as needed for Pain.    LANTUS 100 unit/mL injection Inject 15 Units into the skin 2 (two) times daily.    nicotine (NICODERM CQ) 21 mg/24 hr Place 1 patch onto the skin once daily.    omeprazole (PRILOSEC) 40 MG capsule Take 1 capsule (40 mg total) by mouth once daily.     Family History     Problem Relation (Age of Onset)    Asthma Mother        Tobacco Use    Smoking status: Current Every Day Smoker     Packs/day: 1.00     Years: 20.00     Pack years: 20.00     Types: Cigarettes     Start date: 4/3/1981    Smokeless tobacco: Never Used   Substance and Sexual Activity    Alcohol use: No     Alcohol/week: 0.0 oz    Drug use: Yes     Types: IV, Marijuana, Heroin     Comment: currently, used today, herion    Sexual activity: Yes     Partners: Female     Review of Systems   Unable to perform ROS: Acuity of condition     Objective:     Vital Signs (Most Recent):  Temp: (!) 93.7 °F (34.3 °C) (04/13/19 1318)  Pulse: 99 (04/13/19 1355)  Resp: (!) 28 (04/13/19 1355)  BP: 116/60 (04/13/19 1309)  SpO2: 100 % (04/13/19 1355) Vital Signs (24h Range):  Temp:  [93.7 °F (34.3 °C)] 93.7 °F (34.3 °C)  Pulse:  [] 99  Resp:  [25-33] 28  SpO2:   [94 %-100 %] 100 %  BP: ()/(46-63) 116/60     Weight: 54.4 kg (120 lb)  Body mass index is 19.97 kg/m².    Physical Exam   Constitutional: He appears well-developed and well-nourished. No distress.   HENT:   Head: Normocephalic and atraumatic.   Pupils are not pin point.    Cardiovascular: Normal rate and regular rhythm. Exam reveals no gallop and no friction rub.   Pulmonary/Chest: Effort normal and breath sounds normal. No stridor. No respiratory distress. He has no wheezes. He has no rales.   Abdominal: Soft. Bowel sounds are normal. There is no tenderness. There is no rebound and no guarding.   Neurological:   Patient is arrousable but does not follow commands.   Skin: Skin is warm and dry. He is not diaphoretic.   Nursing note and vitals reviewed.          Significant Labs:   BMP:   Recent Labs   Lab 04/13/19  1001   GLU 1,679*   *   K 7.3*   CL 88*   CO2 <5*   BUN 61*   CREATININE 3.4*   CALCIUM 8.5*     CBC:   Recent Labs   Lab 04/13/19  1001   WBC 22.88*   HGB 11.0*   HCT 45.4   *       Significant Imaging:   CT head- no acute changes.  CT spine- no acute changes.   CXR- pulmonary nodule.

## 2019-04-13 NOTE — ASSESSMENT & PLAN NOTE
Severe DKA. Sugar close to 1700 with bicarb < 5. Stat ABG. Aggressive IV fluids and Insulin. Repeat stat labs now. BMP every 4 hours.  Pulmonary critical care consulted. Not sure of cause- but suspect non-compliance. Infection on differential as patient is a known IV drug user- started on Vanc and Zosyn for now. Blood cultures ordered.  Patient in critical condition.  Monitor closely.  Clinically seems to be better in that he is becoming more alert. Protecting air way.

## 2019-04-13 NOTE — ASSESSMENT & PLAN NOTE
Metabolic acidosis- from above. Giving some bicarb in fluids for now. Rechecking stat labs. ABG.  Renal consulted

## 2019-04-13 NOTE — HPI
Mr. Mabry is a 54 yo M known to me who has frequent hospitalizations for different issues- the last in March for DKA.  He was evidently found down by his mother and presents to the ED by EMS  and found in severe DKA with a sugar close to 1700 with a C02< 5 with a K of 7.3.  The patient presented with AMS and no history was obtained from the patient. The history was obtained by my discussion with the ED staff and reviewing the ED record. He was started on aggressive IV fluids, Insulin drip, broad spectrum Abx (patient is a known IV drug user).  After 2 hours in the ED, the patient has become more alert. He is protecting his airway. The case was discussed with pulmonary/critical care, and the patient will be sent to the ICU.

## 2019-04-13 NOTE — ASSESSMENT & PLAN NOTE
Known IV heroin user. Drug screen was + for opioids.  Blood cultures sent. Cover with Vanc and Zosyn for now.

## 2019-04-13 NOTE — ED PROVIDER NOTES
Encounter Date: 4/13/2019    SCRIBE #1 NOTE: I, Vivian Randall , am scribing for, and in the presence of,  Candice Li MD . I have scribed the following portions of the note - Other sections scribed: HPI. ROS, PE .       History        CC:  Altered Mental Status     HPI: 52 y/o M who has a past medical history of Diabetes mellitus, Hepatitis, HTN (hypertension), Pancreatitis, and Heroin Abuse presents to the ED via EMS for an emergent evaluation of an overdose. Pt was found unresponsive by his mother and she is unsure how long he had been unresponsive prior to being found. Pt was stable upon EMS arrival but his glucose was extremely elevated. Pt has a hx of being non-compliant with his medications. The pt has no other complaints at this time.     The history is provided by the EMS personnel and a parent. No  was used.     Review of patient's allergies indicates:  No Known Allergies  Past Medical History:   Diagnosis Date    Diabetes mellitus     Hepatitis     HTN (hypertension)     Pancreatitis      Past Surgical History:   Procedure Laterality Date    CARDIAC SURGERY  1999    stent placed. ([facility])    ESOPHAGOGASTRODUODENOSCOPY (EGD) Left 4/4/2016    Performed by Huber Camarena MD at [facility]    SHOULDER SURGERY  2013    right shoulder, spur removal.     Family History   Problem Relation Age of Onset    Asthma Mother      Social History     Tobacco Use    Smoking status: Current Every Day Smoker     Packs/day: 1.00     Years: 20.00     Pack years: 20.00     Types: Cigarettes     Start date: 4/3/1981    Smokeless tobacco: Never Used   Substance Use Topics    Alcohol use: No     Alcohol/week: 0.0 oz    Drug use: Yes     Types: IV, Marijuana, Heroin     Comment: currently, used today, herion     Review of Systems   Unable to perform ROS: Mental status change       Physical Exam     Initial Vitals   BP Pulse Resp Temp SpO2   04/13/19 0925 04/13/19 1102 04/13/19 0925  04/13/19 1318 04/13/19 0925   (!) 78/46 97 (!) 28 (!) 93.7 °F (34.3 °C) (!) 94 %      MAP       --              Temp:  [93.7 °F (34.3 °C)-98.7 °F (37.1 °C)] 98.7 °F (37.1 °C)  Pulse:  [] 111  Resp:  [22-40] 28  SpO2:  [94 %-100 %] 100 %  BP: ()/(46-65) 112/57    Physical Exam    Nursing note and vitals reviewed.  Constitutional: He appears lethargic. He appears distressed (Significant).   Thin and unkempt   HENT:   Head: Normocephalic and atraumatic.   Nose: Nose normal.   Mucosa dry   Eyes: Right eye exhibits no discharge. Left eye exhibits no discharge. No scleral icterus.   Pupils 4 mm bilateral   Neck: Normal range of motion. No tracheal deviation present. JVD present.   Cardiovascular: Regular rhythm and intact distal pulses. Tachycardia present.    Heart tones are difficult to assess due to rapid respirations    Radial pulses 1+ and equal, DP pulses 1+ and equal   Pulmonary/Chest: No stridor. Tachypnea noted. He is in respiratory distress. He has no wheezes. He has no rhonchi. He has no rales.   Decreased breath sounds on the right lower lobe   Abdominal: Soft. Bowel sounds are normal. He exhibits no distension. There is no guarding.   Musculoskeletal: He exhibits no edema.   No deformity, there are bruises and abrasions    Neurological: He appears lethargic.   No obvious focal deficits.  Patient is spontaneously moving all of his extremities but is not following commands.    GCS 7 (eyes closed, moans to stimuli, withdraws to pain)   Skin: Skin is warm and dry. Capillary refill takes less than 2 seconds.   Psychiatric:   Unable to assess due to mental status         ED Course   Procedures  Labs Reviewed   CBC W/ AUTO DIFFERENTIAL - Abnormal; Notable for the following components:       Result Value    WBC 22.88 (*)     RBC 4.10 (*)     Hemoglobin 11.0 (*)      (*)     MCH 26.8 (*)     MCHC 24.2 (*)     RDW 14.7 (*)     Platelets 451 (*)     Lymph% 7.0 (*)     Platelet Estimate Increased (*)      All other components within normal limits   COMPREHENSIVE METABOLIC PANEL - Abnormal; Notable for the following components:    Sodium 123 (*)     Potassium 7.3 (*)     Chloride 88 (*)     CO2 <5 (*)     Glucose 1,679 (*)     BUN, Bld 61 (*)     Creatinine 3.4 (*)     Calcium 8.5 (*)     Albumin 3.1 (*)     ALT 64 (*)     eGFR if  23 (*)     eGFR if non  20 (*)     All other components within normal limits    Narrative:       Potassium, CO2, and Glucose critical result(s) called and verbal   readback obtained from Ashely Priscilla, 04/13/2019 11:02   URINALYSIS - Abnormal; Notable for the following components:    Glucose, UA 3+ (*)     Ketones, UA 1+ (*)     Occult Blood UA 1+ (*)     All other components within normal limits   LACTIC ACID, PLASMA - Abnormal; Notable for the following components:    Lactate (Lactic Acid) 8.5 (*)     All other components within normal limits    Narrative:       Lactic Acid critical result(s) called and verbal readback obtained   from Ashely Priscilla, 04/13/2019 11:01   LIPASE - Abnormal; Notable for the following components:    Lipase 254 (*)     All other components within normal limits   AMMONIA - Abnormal; Notable for the following components:    Ammonia 59 (*)     All other components within normal limits   ACETAMINOPHEN LEVEL - Abnormal; Notable for the following components:    Acetaminophen (Tylenol), Serum <3.0 (*)     All other components within normal limits   SALICYLATE LEVEL - Abnormal; Notable for the following components:    Salicylate Lvl <5.0 (*)     All other components within normal limits   DRUG SCREEN PANEL, URINE EMERGENCY - Abnormal; Notable for the following components:    Creatinine, Random Ur 20.0 (*)     All other components within normal limits   BETA - HYDROXYBUTYRATE, SERUM - Abnormal; Notable for the following components:    Beta-Hydroxybutyrate 5.9 (*)     All other components within normal limits   URINALYSIS MICROSCOPIC - Abnormal;  Notable for the following components:    RBC, UA 8 (*)     WBC, UA 8 (*)     All other components within normal limits   BASIC METABOLIC PANEL - Abnormal; Notable for the following components:    Sodium 132 (*)     Potassium 5.3 (*)     CO2 9 (*)     Glucose 1,327 (*)     BUN, Bld 55 (*)     Creatinine 3.2 (*)     Calcium 8.6 (*)     Anion Gap 25 (*)     eGFR if  24 (*)     eGFR if non  21 (*)     All other components within normal limits    Narrative:       CO2 critical result(s) called and verbal readback obtained from   Angelique Jasvir, 04/13/2019 14:40   Glucose  critical result(s) called and verbal readback obtained from   Angelique Jasvir, 04/13/2019 14:39   MAGNESIUM - Abnormal; Notable for the following components:    Magnesium 2.8 (*)     All other components within normal limits   PHOSPHORUS - Abnormal; Notable for the following components:    Phosphorus 6.7 (*)     All other components within normal limits   ISTAT PROCEDURE - Abnormal; Notable for the following components:    POC PH 7.131 (*)     POC PCO2 25.9 (*)     POC PO2 106 (*)     POC HCO3 8.6 (*)     POC TCO2 9 (*)     All other components within normal limits   TSH   PROTIME-INR   CK   POCT GLUCOSE MONITORING CONTINUOUS   POCT GLUCOSE MONITORING CONTINUOUS   POCT GLUCOSE MONITORING CONTINUOUS        ECG Results          EKG 12-lead (In process)  Result time 04/13/19 11:02:55    In process by Interface, Lab In [facility] (04/13/19 11:02:55)                 Narrative:    Test Reason : R41.82,    Vent. Rate : 103 BPM     Atrial Rate : 119 BPM     P-R Int : 000 ms          QRS Dur : 142 ms      QT Int : 416 ms       P-R-T Axes : 000 105 023 degrees     QTc Int : 544 ms    Wide QRS rhythm  Rightward axis  Nonspecific intraventricular block  Possible Inferior infarct ,age undetermined  Abnormal ECG  When compared with ECG of 15-MAR-2019 10:55,  Significant changes have occurred    Referred By: KIRTI MACHUCA           Confirmed  By:                   In process by Interface, Lab In [facility] (04/13/19 10:58:35)                 Narrative:    Test Reason : R41.82,    Vent. Rate : 103 BPM     Atrial Rate : 119 BPM     P-R Int : 000 ms          QRS Dur : 142 ms      QT Int : 416 ms       P-R-T Axes : 000 105 023 degrees     QTc Int : 544 ms    Wide QRS rhythm  Rightward axis  Nonspecific intraventricular block  Possible Inferior infarct ,age undetermined  Abnormal ECG  When compared with ECG of 15-MAR-2019 10:55,  Significant changes have occurred    Referred By: KIRTI MACHUCA           Confirmed By:                             Imaging Results          CT Head Without Contrast (Final result)  Result time 04/13/19 13:09:30    Final result by Davide Hunter MD (04/13/19 13:09:30)                 Impression:      No acute large vascular territory infarct or intracranial hemorrhage identified.    Mild generalized cerebral volume loss and suspected supratentorial white matter mild chronic small vessel ischemic change.    Right pontine interval lacunar type infarct, likely remote.  Correlate clinically.      Electronically signed by: Davide Hunter MD  Date:    04/13/2019  Time:    13:09             Narrative:    EXAMINATION:  CT HEAD WITHOUT CONTRAST    CLINICAL HISTORY:  Confusion/delirium, altered LOC, unexplained;    TECHNIQUE:  Low dose axial CT images obtained throughout the head without intravenous contrast. Sagittal and coronal reconstructions were performed.    COMPARISON:  MRI brain 07/29/2010 and head CT 07/25/2010    FINDINGS:  Beam hardening with streak artifact somewhat limits evaluation.    Intracranial compartment:    The ventricles are midline and grossly stable in size and configuration without distortion by mass effect or acute hydrocephalus.  No extra-axial blood or fluid collections.    Age-appropriate mild generalized cerebral volume loss.  Mild degree of periventricular white matter hypoattenuation, nonspecific but likely sequela  of chronic small vessel ischemic change in this age group.  Interval more focal area of hypoattenuation at the right ludwin with slight volume loss suggesting remote lacunar type infarct.  No parenchymal mass, hemorrhage, edema or major vascular distribution infarct.  Calcific atherosclerosis of the bilateral cavernous ICAs.    Skull/extracranial contents (limited evaluation): No fracture.  Minimal mucosal thickening within the bilateral sphenoid sinuses.  Mastoid air cells and remaining imaged paranasal sinuses are essentially clear.  Visualized portions of the orbits are within normal limits.                                CT CERVICAL SPINE WITHOUT CONTRAST (Final result)  Result time 04/13/19 13:14:46    Final result by Davide Hunter MD (04/13/19 13:14:46)                 Impression:      No CT evidence of cervical spine acute osseous traumatic injury.    Right upper lobe 5 mm solid pulmonary nodule.  For a solid nodule <6 mm, Fleischner Society 2017 guidelines recommend no routine follow up for a low risk patient, or follow-up with non-contrast chest CT at 12 months in a high risk patient.    Mild fluid distension of the imaged upper esophagus which may be related to reflux or esophageal dysmotility, and could place patient at risk for aspiration.  Correlate clinically.    This report was flagged in Epic as containing an incidental finding.      Electronically signed by: Davide Hunter MD  Date:    04/13/2019  Time:    13:14             Narrative:    EXAMINATION:  CT CERVICAL SPINE WITHOUT CONTRAST    CLINICAL HISTORY:  C-spine trauma, NEXUS/CCR positive, low risk;    TECHNIQUE:  Low dose axial images, sagittal and coronal reformations were performed though the cervical spine.  Contrast was not administered.    COMPARISON:  MRI cervical spine 07/07/2014    FINDINGS:  Bones are well mineralized. Overall alignment is within normal limits.  Vertebral body and intervertebral disc space heights are relatively  maintained.  No prevertebral soft tissue swelling.  No displaced fracture, dislocation or significant listhesis.  Minimal degenerative change at the atlantodental interval..  No subcutaneous emphysema or radiodense retained foreign body.    Biapical centrilobular pulmonary emphysema and minimal pleuroparenchymal scarring without pneumothorax.  There is a 5 mm solid nodule along the posteromedial aspect of the right lung apex.  Mild fluid distention of the imaged upper esophagus.                               X-Ray Chest AP Portable (Final result)  Result time 04/13/19 12:18:16    Final result by Davide Hunter MD (04/13/19 12:18:16)                 Impression:      No detrimental change or radiographic acute intrathoracic process seen.      Electronically signed by: Davide Hunter MD  Date:    04/13/2019  Time:    12:18             Narrative:    EXAMINATION:  XR CHEST AP PORTABLE    CLINICAL HISTORY:  Chest Pain;    TECHNIQUE:  Single frontal view of the chest was performed.    COMPARISON:  Chest radiograph 03/12/2019 and CT abdomen and pelvis 03/13/2019    FINDINGS:  Monitoring leads overlie the chest.  Patient is slightly rotated.  No detrimental change.    Cardiomediastinal silhouette is midline and within normal limits.  Pulmonary vasculature and hilar regions are within normal limits.  The lungs are symmetrically well expanded without large consolidation or new focal opacity.  No pleural effusion or pneumothorax.  Osseous structures appear intact.  PA and lateral views can be obtained.                                            Scribe Attestation:   Scribe #1: I performed the above scribed service and the documentation accurately describes the services I performed. I attest to the accuracy of the note.    Attending Attestation:           Physician Attestation for Scribe:  Physician Attestation Statement for Scribe #1: I, Candice Li MD , reviewed documentation, as scribed by Vivian Randall in my presence,  and it is both accurate and complete.                 ED Course as of Apr 13 1950   Sat Apr 13, 2019   1054 Leukocytosis noted.   WBC(!): 22.88 [MH]   1054 Ammonia elevated   Ammonia(!): 59 [MH]   1132 Medical decision making:  Physical exam and history and labs are consistent with DKA.  Due to the fall on waiting on the head CT prior to consulting the ICU for admission.  There is a leukocytosis but I do not have infections source.    [MH]   1133 Lipase isElevated at 254    [MH]   1133 Patient's mental status has improved since arrival.  He is now open his eyes to loud verbal and following commands weakly.  Moving all extremities equally.    [MH]   1133 Being creatinine are elevated and consistent with pre renal azotemia    [MH]   1134 X-Ray Chest AP Portable [MH]   1156 X-Ray Chest AP Portable [MH]   1301 Chest x-ray reviewed no new infiltrates   X-Ray Chest AP Portable [MH]   1302 Chest x-ray reviewed no new infiltrates    [MH]   1338 Spoke with Dr. Dickinson concerning ICU admission.  I will repeat the BMP here and if there is not large improvement in potassium will transfer to [facility] for emergent dialysis    [MH]   1439 ABG: pH 7.1    [MH]      ED Course User Index  [MH] Candice Li MD     Clinical Impression:       ICD-10-CM ICD-9-CM   1. Diabetic ketoacidosis with coma associated with type 1 diabetes mellitus E10.11 250.33   2. Altered mental status, unspecified altered mental status type R41.82 780.97   3. Fall, initial encounter W19.XXXA E888.9   4. Polysubstance abuse F19.10 305.90   5. Dehydration E86.0 276.51   6. Hypotension due to hypovolemia I95.89 458.8    E86.1 276.52   7. Acute renal failure with other specified pathological lesion in kidney N17.8 584.8   8. Other elevated white blood cell (WBC) count D72.828 288.69       CRITICAL CARE TIME  Based on this patient's presenting history and physical exam, this patient had high probability of sudden, clinically significant deterioration and the  services I provided to this patient were to treat and/or prevent clinically significant deterioration.      These services included the following: chart data review, reviewing nursing notes and researching old charts from internal and external sources, documentation time, consultant collaboration regarding findings and treatment options, medication orders and management, direct patient care, vital sign assessments, physical exam reassessments, and ordering, interpreting and reviewing diagnostic studies/lab tests.    Aggregate critical care time was approximately 105 minutes, which includes only time during which I was engaged in work directly related to the patient's care, as described above, whether at the bedside or elsewhere in the Emergency Department.  It did not include time spent performing other reported procedures or the services of residents, students, nurses or physician assistants.                               Candice Li MD  04/13/19 1950

## 2019-04-13 NOTE — ED TRIAGE NOTES
Pt is lethargic unable to obtain any information.  According to EMS pt mother found pt unresponsive and call 911.  Pt mother reports that pt has been using drugs and he is a diabetic.  Upon arrival POCT CBG reads > 500 mg/dl.

## 2019-04-13 NOTE — PROGRESS NOTES
Results for AN CHOUDHURY (MRN [id number]) as of 4/13/2019 14:38   Ref. Range 4/13/2019 14:23   POC PH Latest Ref Range: 7.35 - 7.45  7.131 (LL)   POC PCO2 Latest Ref Range: 35 - 45 mmHg 25.9 (LL)   POC PO2 Latest Ref Range: 80 - 100 mmHg 106 (H)   POC BE Latest Ref Range: -2 to 2 mmol/L -19   POC HCO3 Latest Ref Range: 24 - 28 mmol/L 8.6 (L)   POC SATURATED O2 Latest Ref Range: 95 - 100 % 96   POC TCO2 Latest Ref Range: 23 - 27 mmol/L 9 (L)   FiO2 Unknown 21   Sample Unknown ARTERIAL   DelSys Unknown Room Air   Allens Test Unknown Pass   Site Unknown LR   Mode Unknown SPONT

## 2019-04-13 NOTE — ED NOTES
Serina SANTANA with lab called critical labs values of K 7.5, CO2 <5, Glucose 1679, and Lactic Acid 8.5.  Dr. Li is aware of lab and will write out orders.  Will continue to monitor.

## 2019-04-13 NOTE — ASSESSMENT & PLAN NOTE
Presents with K of 7.3.  Insulin/bicarb and fluids should help. Repeat stat labs. Has some peaked T waves. Should correct. Continue tele monitoring.

## 2019-04-13 NOTE — ED NOTES
Insulin gtt initiated.  Humulin R 6 unit/ml via pump to Rt IJ.  Will continue to monitor.  IV antibiotics Zosyn 4.5 gm infusing via pump to Rt AC.  Will continue to monitor

## 2019-04-14 LAB
ANION GAP SERPL CALC-SCNC: 13 MMOL/L (ref 8–16)
ANION GAP SERPL CALC-SCNC: 8 MMOL/L (ref 8–16)
ANION GAP SERPL CALC-SCNC: 8 MMOL/L (ref 8–16)
BUN SERPL-MCNC: 31 MG/DL (ref 6–20)
BUN SERPL-MCNC: 31 MG/DL (ref 6–20)
BUN SERPL-MCNC: 34 MG/DL (ref 6–20)
CALCIUM SERPL-MCNC: 8 MG/DL (ref 8.7–10.5)
CALCIUM SERPL-MCNC: 8 MG/DL (ref 8.7–10.5)
CALCIUM SERPL-MCNC: 8.4 MG/DL (ref 8.7–10.5)
CHLORIDE SERPL-SCNC: 117 MMOL/L (ref 95–110)
CHLORIDE SERPL-SCNC: 117 MMOL/L (ref 95–110)
CHLORIDE SERPL-SCNC: 119 MMOL/L (ref 95–110)
CO2 SERPL-SCNC: 18 MMOL/L (ref 23–29)
CO2 SERPL-SCNC: 22 MMOL/L (ref 23–29)
CO2 SERPL-SCNC: 22 MMOL/L (ref 23–29)
CREAT SERPL-MCNC: 1.1 MG/DL (ref 0.5–1.4)
CREAT SERPL-MCNC: 1.1 MG/DL (ref 0.5–1.4)
CREAT SERPL-MCNC: 1.5 MG/DL (ref 0.5–1.4)
EST. GFR  (AFRICAN AMERICAN): >60 ML/MIN/1.73 M^2
EST. GFR  (NON AFRICAN AMERICAN): 53 ML/MIN/1.73 M^2
EST. GFR  (NON AFRICAN AMERICAN): >60 ML/MIN/1.73 M^2
EST. GFR  (NON AFRICAN AMERICAN): >60 ML/MIN/1.73 M^2
GLUCOSE SERPL-MCNC: 100 MG/DL (ref 70–110)
GLUCOSE SERPL-MCNC: 190 MG/DL (ref 70–110)
GLUCOSE SERPL-MCNC: 190 MG/DL (ref 70–110)
POCT GLUCOSE: 101 MG/DL (ref 70–110)
POCT GLUCOSE: 113 MG/DL (ref 70–110)
POCT GLUCOSE: 119 MG/DL (ref 70–110)
POCT GLUCOSE: 123 MG/DL (ref 70–110)
POCT GLUCOSE: 131 MG/DL (ref 70–110)
POCT GLUCOSE: 147 MG/DL (ref 70–110)
POCT GLUCOSE: 166 MG/DL (ref 70–110)
POCT GLUCOSE: 197 MG/DL (ref 70–110)
POCT GLUCOSE: 207 MG/DL (ref 70–110)
POCT GLUCOSE: 221 MG/DL (ref 70–110)
POCT GLUCOSE: 222 MG/DL (ref 70–110)
POCT GLUCOSE: 227 MG/DL (ref 70–110)
POCT GLUCOSE: 239 MG/DL (ref 70–110)
POCT GLUCOSE: 244 MG/DL (ref 70–110)
POCT GLUCOSE: 256 MG/DL (ref 70–110)
POCT GLUCOSE: 268 MG/DL (ref 70–110)
POCT GLUCOSE: 60 MG/DL (ref 70–110)
POTASSIUM SERPL-SCNC: 3.9 MMOL/L (ref 3.5–5.1)
POTASSIUM SERPL-SCNC: 3.9 MMOL/L (ref 3.5–5.1)
POTASSIUM SERPL-SCNC: 4.4 MMOL/L (ref 3.5–5.1)
SODIUM SERPL-SCNC: 147 MMOL/L (ref 136–145)
SODIUM SERPL-SCNC: 147 MMOL/L (ref 136–145)
SODIUM SERPL-SCNC: 150 MMOL/L (ref 136–145)

## 2019-04-14 PROCEDURE — S5571 INSULIN DISPOS PEN 3 ML: HCPCS

## 2019-04-14 PROCEDURE — 25000003 PHARM REV CODE 250

## 2019-04-14 PROCEDURE — 20000000 HC ICU ROOM

## 2019-04-14 PROCEDURE — 63600175 PHARM REV CODE 636 W HCPCS

## 2019-04-14 PROCEDURE — 63600175 PHARM REV CODE 636 W HCPCS: Performed by: HOSPITALIST

## 2019-04-14 PROCEDURE — 25000003 PHARM REV CODE 250: Performed by: STUDENT IN AN ORGANIZED HEALTH CARE EDUCATION/TRAINING PROGRAM

## 2019-04-14 PROCEDURE — 80048 BASIC METABOLIC PNL TOTAL CA: CPT

## 2019-04-14 PROCEDURE — 36415 COLL VENOUS BLD VENIPUNCTURE: CPT

## 2019-04-14 PROCEDURE — 25000003 PHARM REV CODE 250: Performed by: EMERGENCY MEDICINE

## 2019-04-14 PROCEDURE — 80048 BASIC METABOLIC PNL TOTAL CA: CPT | Mod: 91

## 2019-04-14 PROCEDURE — 63600175 PHARM REV CODE 636 W HCPCS: Performed by: INTERNAL MEDICINE

## 2019-04-14 RX ORDER — INSULIN ASPART 100 [IU]/ML
1-10 INJECTION, SOLUTION INTRAVENOUS; SUBCUTANEOUS
Status: DISCONTINUED | OUTPATIENT
Start: 2019-04-14 | End: 2019-04-16 | Stop reason: HOSPADM

## 2019-04-14 RX ORDER — IBUPROFEN 200 MG
16 TABLET ORAL
Status: DISCONTINUED | OUTPATIENT
Start: 2019-04-14 | End: 2019-04-16 | Stop reason: HOSPADM

## 2019-04-14 RX ORDER — DIPHENHYDRAMINE HCL 25 MG
25 CAPSULE ORAL NIGHTLY PRN
Status: DISCONTINUED | OUTPATIENT
Start: 2019-04-14 | End: 2019-04-15

## 2019-04-14 RX ORDER — IBUPROFEN 200 MG
24 TABLET ORAL
Status: DISCONTINUED | OUTPATIENT
Start: 2019-04-14 | End: 2019-04-16 | Stop reason: HOSPADM

## 2019-04-14 RX ORDER — GLUCAGON 1 MG
1 KIT INJECTION
Status: DISCONTINUED | OUTPATIENT
Start: 2019-04-14 | End: 2019-04-16 | Stop reason: HOSPADM

## 2019-04-14 RX ORDER — GABAPENTIN 300 MG/1
300 CAPSULE ORAL 2 TIMES DAILY
Status: DISCONTINUED | OUTPATIENT
Start: 2019-04-15 | End: 2019-04-15

## 2019-04-14 RX ORDER — ACETAMINOPHEN 325 MG/1
650 TABLET ORAL EVERY 4 HOURS PRN
Status: DISCONTINUED | OUTPATIENT
Start: 2019-04-14 | End: 2019-04-16 | Stop reason: HOSPADM

## 2019-04-14 RX ORDER — DEXTROSE MONOHYDRATE, SODIUM CHLORIDE, AND POTASSIUM CHLORIDE 50; 1.49; 4.5 G/1000ML; G/1000ML; G/1000ML
INJECTION, SOLUTION INTRAVENOUS CONTINUOUS
Status: DISCONTINUED | OUTPATIENT
Start: 2019-04-14 | End: 2019-04-14

## 2019-04-14 RX ORDER — ONDANSETRON 2 MG/ML
8 INJECTION INTRAMUSCULAR; INTRAVENOUS EVERY 8 HOURS PRN
Status: DISCONTINUED | OUTPATIENT
Start: 2019-04-14 | End: 2019-04-16 | Stop reason: HOSPADM

## 2019-04-14 RX ORDER — GABAPENTIN 300 MG/1
300 CAPSULE ORAL 2 TIMES DAILY
Status: DISCONTINUED | OUTPATIENT
Start: 2019-04-14 | End: 2019-04-14

## 2019-04-14 RX ORDER — GABAPENTIN 300 MG/1
CAPSULE ORAL
Status: COMPLETED
Start: 2019-04-14 | End: 2019-04-14

## 2019-04-14 RX ADMIN — DEXTROSE 1000 ML: 10 SOLUTION INTRAVENOUS at 01:04

## 2019-04-14 RX ADMIN — GABAPENTIN: 300 CAPSULE ORAL at 05:04

## 2019-04-14 RX ADMIN — DEXTROSE MONOHYDRATE 12.5 G: 25 INJECTION, SOLUTION INTRAVENOUS at 01:04

## 2019-04-14 RX ADMIN — DEXTROSE, SODIUM CHLORIDE, AND POTASSIUM CHLORIDE: 5; .45; .15 INJECTION INTRAVENOUS at 12:04

## 2019-04-14 RX ADMIN — INSULIN ASPART 2 UNITS: 100 INJECTION, SOLUTION INTRAVENOUS; SUBCUTANEOUS at 08:04

## 2019-04-14 RX ADMIN — ONDANSETRON 8 MG: 2 INJECTION INTRAMUSCULAR; INTRAVENOUS at 08:04

## 2019-04-14 RX ADMIN — INSULIN DETEMIR: 100 INJECTION, SOLUTION SUBCUTANEOUS at 05:04

## 2019-04-15 LAB
ANION GAP SERPL CALC-SCNC: 7 MMOL/L (ref 8–16)
BASOPHILS # BLD AUTO: 0 K/UL (ref 0–0.2)
BASOPHILS NFR BLD: 0 % (ref 0–1.9)
BUN SERPL-MCNC: 13 MG/DL (ref 6–20)
CALCIUM SERPL-MCNC: 8.4 MG/DL (ref 8.7–10.5)
CHLORIDE SERPL-SCNC: 113 MMOL/L (ref 95–110)
CO2 SERPL-SCNC: 23 MMOL/L (ref 23–29)
CREAT SERPL-MCNC: 0.7 MG/DL (ref 0.5–1.4)
DIFFERENTIAL METHOD: ABNORMAL
EOSINOPHIL # BLD AUTO: 0.1 K/UL (ref 0–0.5)
EOSINOPHIL NFR BLD: 0.8 % (ref 0–8)
ERYTHROCYTE [DISTWIDTH] IN BLOOD BY AUTOMATED COUNT: 15.7 % (ref 11.5–14.5)
EST. GFR  (AFRICAN AMERICAN): >60 ML/MIN/1.73 M^2
EST. GFR  (NON AFRICAN AMERICAN): >60 ML/MIN/1.73 M^2
ESTIMATED AVG GLUCOSE: 200 MG/DL (ref 68–131)
ESTIMATED AVG GLUCOSE: 200 MG/DL (ref 68–131)
GLUCOSE SERPL-MCNC: 41 MG/DL (ref 70–110)
HBA1C MFR BLD HPLC: 8.6 % (ref 4–5.6)
HBA1C MFR BLD HPLC: 8.6 % (ref 4–5.6)
HCT VFR BLD AUTO: 35.5 % (ref 40–54)
HGB BLD-MCNC: 11.5 G/DL (ref 14–18)
LYMPHOCYTES # BLD AUTO: 1.4 K/UL (ref 1–4.8)
LYMPHOCYTES NFR BLD: 14.8 % (ref 18–48)
MCH RBC QN AUTO: 26.9 PG (ref 27–31)
MCHC RBC AUTO-ENTMCNC: 32.4 G/DL (ref 32–36)
MCV RBC AUTO: 83 FL (ref 82–98)
MONOCYTES # BLD AUTO: 0.8 K/UL (ref 0.3–1)
MONOCYTES NFR BLD: 7.8 % (ref 4–15)
NEUTROPHILS # BLD AUTO: 7.4 K/UL (ref 1.8–7.7)
NEUTROPHILS NFR BLD: 76.6 % (ref 38–73)
PLATELET # BLD AUTO: 219 K/UL (ref 150–350)
PMV BLD AUTO: 9.9 FL (ref 9.2–12.9)
POCT GLUCOSE: 107 MG/DL (ref 70–110)
POCT GLUCOSE: 177 MG/DL (ref 70–110)
POCT GLUCOSE: 186 MG/DL (ref 70–110)
POCT GLUCOSE: 214 MG/DL (ref 70–110)
POCT GLUCOSE: 216 MG/DL (ref 70–110)
POCT GLUCOSE: 242 MG/DL (ref 70–110)
POCT GLUCOSE: 44 MG/DL (ref 70–110)
POCT GLUCOSE: 83 MG/DL (ref 70–110)
POCT GLUCOSE: >500 MG/DL (ref 70–110)
POTASSIUM SERPL-SCNC: 3.8 MMOL/L (ref 3.5–5.1)
RBC # BLD AUTO: 4.28 M/UL (ref 4.6–6.2)
SODIUM SERPL-SCNC: 143 MMOL/L (ref 136–145)
WBC # BLD AUTO: 9.69 K/UL (ref 3.9–12.7)

## 2019-04-15 PROCEDURE — S5571 INSULIN DISPOS PEN 3 ML: HCPCS | Performed by: INTERNAL MEDICINE

## 2019-04-15 PROCEDURE — 83036 HEMOGLOBIN GLYCOSYLATED A1C: CPT

## 2019-04-15 PROCEDURE — 25000003 PHARM REV CODE 250: Performed by: STUDENT IN AN ORGANIZED HEALTH CARE EDUCATION/TRAINING PROGRAM

## 2019-04-15 PROCEDURE — 94640 AIRWAY INHALATION TREATMENT: CPT

## 2019-04-15 PROCEDURE — 85025 COMPLETE CBC W/AUTO DIFF WBC: CPT

## 2019-04-15 PROCEDURE — 36415 COLL VENOUS BLD VENIPUNCTURE: CPT

## 2019-04-15 PROCEDURE — 80048 BASIC METABOLIC PNL TOTAL CA: CPT

## 2019-04-15 PROCEDURE — 25000242 PHARM REV CODE 250 ALT 637 W/ HCPCS: Performed by: INTERNAL MEDICINE

## 2019-04-15 PROCEDURE — 25000003 PHARM REV CODE 250: Performed by: INTERNAL MEDICINE

## 2019-04-15 PROCEDURE — 11000001 HC ACUTE MED/SURG PRIVATE ROOM

## 2019-04-15 PROCEDURE — 25000003 PHARM REV CODE 250: Performed by: EMERGENCY MEDICINE

## 2019-04-15 PROCEDURE — 63600175 PHARM REV CODE 636 W HCPCS: Performed by: INTERNAL MEDICINE

## 2019-04-15 PROCEDURE — S4991 NICOTINE PATCH NONLEGEND: HCPCS | Performed by: INTERNAL MEDICINE

## 2019-04-15 RX ORDER — ENOXAPARIN SODIUM 100 MG/ML
40 INJECTION SUBCUTANEOUS EVERY 24 HOURS
Status: DISCONTINUED | OUTPATIENT
Start: 2019-04-15 | End: 2019-04-16 | Stop reason: HOSPADM

## 2019-04-15 RX ORDER — ATORVASTATIN CALCIUM 40 MG/1
40 TABLET, FILM COATED ORAL DAILY
Status: DISCONTINUED | OUTPATIENT
Start: 2019-04-15 | End: 2019-04-16 | Stop reason: HOSPADM

## 2019-04-15 RX ORDER — ALBUTEROL SULFATE 90 UG/1
2 AEROSOL, METERED RESPIRATORY (INHALATION) 3 TIMES DAILY
Status: DISCONTINUED | OUTPATIENT
Start: 2019-04-15 | End: 2019-04-16 | Stop reason: HOSPADM

## 2019-04-15 RX ORDER — IBUPROFEN 200 MG
1 TABLET ORAL DAILY
Status: DISCONTINUED | OUTPATIENT
Start: 2019-04-15 | End: 2019-04-16 | Stop reason: HOSPADM

## 2019-04-15 RX ORDER — GABAPENTIN 300 MG/1
300 CAPSULE ORAL 2 TIMES DAILY
Status: DISCONTINUED | OUTPATIENT
Start: 2019-04-15 | End: 2019-04-16 | Stop reason: HOSPADM

## 2019-04-15 RX ADMIN — GABAPENTIN 300 MG: 300 CAPSULE ORAL at 08:04

## 2019-04-15 RX ADMIN — NICOTINE 1 PATCH: 21 PATCH, EXTENDED RELEASE TRANSDERMAL at 09:04

## 2019-04-15 RX ADMIN — ACETAMINOPHEN 650 MG: 325 TABLET, FILM COATED ORAL at 08:04

## 2019-04-15 RX ADMIN — ALBUTEROL SULFATE 2 PUFF: 90 AEROSOL, METERED RESPIRATORY (INHALATION) at 05:04

## 2019-04-15 RX ADMIN — INSULIN ASPART 4 UNITS: 100 INJECTION, SOLUTION INTRAVENOUS; SUBCUTANEOUS at 12:04

## 2019-04-15 RX ADMIN — INSULIN DETEMIR 10 UNITS: 100 INJECTION, SOLUTION SUBCUTANEOUS at 08:04

## 2019-04-15 RX ADMIN — ATORVASTATIN CALCIUM 40 MG: 40 TABLET, FILM COATED ORAL at 09:04

## 2019-04-15 RX ADMIN — ACETAMINOPHEN 650 MG: 325 TABLET, FILM COATED ORAL at 12:04

## 2019-04-15 RX ADMIN — INSULIN ASPART 2 UNITS: 100 INJECTION, SOLUTION INTRAVENOUS; SUBCUTANEOUS at 05:04

## 2019-04-15 RX ADMIN — ENOXAPARIN SODIUM 40 MG: 100 INJECTION SUBCUTANEOUS at 05:04

## 2019-04-15 RX ADMIN — DEXTROSE MONOHYDRATE 25 G: 500 INJECTION PARENTERAL at 03:04

## 2019-04-15 RX ADMIN — ALBUTEROL SULFATE 2 PUFF: 90 AEROSOL, METERED RESPIRATORY (INHALATION) at 08:04

## 2019-04-15 RX ADMIN — INSULIN DETEMIR 10 UNITS: 100 INJECTION, SOLUTION SUBCUTANEOUS at 09:04

## 2019-04-15 RX ADMIN — DIPHENHYDRAMINE HYDROCHLORIDE 25 MG: 25 CAPSULE ORAL at 12:04

## 2019-04-15 RX ADMIN — INSULIN ASPART 2 UNITS: 100 INJECTION, SOLUTION INTRAVENOUS; SUBCUTANEOUS at 08:04

## 2019-04-15 NOTE — PROGRESS NOTES
[facility] Hospital Medicine  Progress Note    Patient Name: Cali Mabry  MRN: [id number]  Patient Class: IP- Inpatient   Admission Date: 4/13/2019  Length of Stay: 2 days  Attending Physician: Tyrese Dickinson MD  Primary Care Provider: Stefan Hung MD        Subjective:     Principal Problem:DKA (diabetic ketoacidoses)    HPI:  Mr. Mabry is a 54 yo M known to me who has frequent hospitalizations for different issues- the last in March for DKA.  He was evidently found down by his mother and presents to the ED by EMS  and found in severe DKA with a sugar close to 1700 with a C02< 5 with a K of 7.3.  The patient presented with AMS and no history was obtained from the patient. The history was obtained by my discussion with the ED staff and reviewing the ED record. He was started on aggressive IV fluids, Insulin drip, broad spectrum Abx (patient is a known IV drug user).  After 2 hours in the ED, the patient has become more alert. He is protecting his airway. The case was discussed with pulmonary/critical care, and the patient will be sent to the ICU.      Hospital Course:  Mr. Mabry is a 54 yo M known to me who has frequent hospitalizations for different issues- the last in March for DKA.  He was evidently found down by his mother and presents to the ED on 4/13/19 by EMS  and found in severe DKA with a sugar close to 1700 with a C02< 5 with a K of 7.3.  The patient presented with AMS and no history was obtained from the patient. The history was obtained by my discussion with the ED staff and reviewing the ED record. He was started on aggressive IV fluids, Insulin drip, broad spectrum Abx (patient is a known IV drug user).  After 2 hours in the ED, the patient has become more alert. He is protecting his airway. The case was discussed with pulmonary/critical care, and the patient will be sent to the ICU.  The patient was weaned off insulin drip. Labs went to normal.  Patient had  some hypoglycemia on 4/15/19 and insulin cut back. A1c ordered. LFT's were elevated and repeat ordered for 4/16.  Long discussion with patient and his continued IV drug use. He has poor insight.  Patient moved to the floor on 4/16/19.        Interval History: No new issues.     Review of Systems   Constitutional: Negative for activity change.   HENT: Negative for congestion.    Respiratory: Negative for chest tightness and shortness of breath.    Gastrointestinal: Negative for abdominal pain.   Genitourinary: Negative for difficulty urinating.     Objective:     Vital Signs (Most Recent):  Temp: 98.6 °F (37 °C) (04/15/19 0301)  Pulse: 85 (04/15/19 0630)  Resp: (!) 24 (04/15/19 0630)  BP: 136/71 (04/15/19 0600)  SpO2: 97 % (04/15/19 0630) Vital Signs (24h Range):  Temp:  [98.5 °F (36.9 °C)-98.6 °F (37 °C)] 98.6 °F (37 °C)  Pulse:  [80-93] 85  Resp:  [20-32] 24  SpO2:  [93 %-100 %] 97 %  BP: (113-148)/(57-92) 136/71     Weight: 52.4 kg (115 lb 8.3 oz)  Body mass index is 17.56 kg/m².    Intake/Output Summary (Last 24 hours) at 4/15/2019 0826  Last data filed at 4/15/2019 0400  Gross per 24 hour   Intake 1745.32 ml   Output 2250 ml   Net -504.68 ml      Physical Exam   Constitutional: He is oriented to person, place, and time. He appears well-developed and well-nourished.   HENT:   Head: Normocephalic and atraumatic.   Cardiovascular: Normal rate.   Neurological: He is alert and oriented to person, place, and time.   Skin: Skin is warm and dry.   Psychiatric: He has a normal mood and affect. His behavior is normal.   Nursing note and vitals reviewed.      Significant Labs:   CBC:   Recent Labs   Lab 04/13/19  1001   WBC 22.88*   HGB 11.0*   HCT 45.4   *       Significant Imaging:     Assessment/Plan:      * DKA (diabetic ketoacidoses)  Severe DKA. Sugar close to 1700 with bicarb < 5. Stat ABG. Aggressive IV fluids and Insulin. Repeat stat labs now. BMP every 4 hours.  Pulmonary critical care consulted. Not sure  of cause- but suspect non-compliance. Infection on differential as patient is a known IV drug user- started on Vanc and Zosyn for now. Blood cultures ordered.  Patient in critical condition.  Monitor closely.  Clinically seems to be better in that he is becoming more alert. Protecting air way.     Resolved. Off insulin drip. Episode of hypoglycemia today.  Cut back insulin. Transfer to floor.       Acidosis  Metabolic acidosis- from above. Giving some bicarb in fluids for now. Rechecking stat labs. ABG.  Renal consulted   Resolved.    Type 2 diabetes mellitus, uncontrolled  No known manifestations.   A1c ordered.  Complication of hypoglycemia on 4/15.      Hyperkalemia  Presents with K of 7.3.  Insulin/bicarb and fluids should help. Repeat stat labs. Has some peaked T waves. Should correct. Continue tele monitoring.    Resolved.     Acute renal failure  From DKA and hyperosmolar state.  IV fluids.  Repeat labs now. Then BMP every 4 hours.  Resolved.        Heroin abuse  Known IV heroin user. Drug screen was + for opioids.  Blood cultures sent. Cover with Vanc and Zosyn for now. Will d/c abx.  Had discussion with patient. Poor insight and will not quit.       Hyponatremia  From hyperosmolar state. Will improve with fluids.       Pulmonary nodule  Will need follow up.  5mm R upper lobe found on CT. Patient is a smoker.         Tobacco abuse  Smoking cessation education when appropriate.  Today. > 3 minutes. Nicotine patch       Chronic hepatitis C  No acute issues.       Anemia of chronic disease  Stable- will likely drop after aggressive fluids      Hyperlipidemia  Resume home meds when appropriate.       Essential hypertension  No acute issues       Opioid dependence, continuous  Noted.         VTE Risk Mitigation (From admission, onward)        Ordered     Place DAWIT hose  Until discontinued      04/13/19 1510     IP VTE LOW RISK PATIENT  Once      04/13/19 1510          Critical care time spent on the evaluation  and treatment of severe organ dysfunction, review of pertinent labs and imaging studies, discussions with consulting providers and discussions with patient/family: 35  minutes.    Tyrese Singh MD  Department of Hospital Medicine   [facility]

## 2019-04-15 NOTE — CARE UPDATE
Transfer Summary:    Mr. Mabry is a 52 yo M known to me who has frequent hospitalizations for different issues- the last in March for DKA.  He was evidently found down by his mother and presents to the ED on 4/13/19 by EMS  and found in severe DKA with a sugar close to 1700 with a C02< 5 with a K of 7.3.  The patient presented with AMS and no history was obtained from the patient. The history was obtained by my discussion with the ED staff and reviewing the ED record. He was started on aggressive IV fluids, Insulin drip, broad spectrum Abx (patient is a known IV drug user).  After 2 hours in the ED, the patient has become more alert. He is protecting his airway. The case was discussed with pulmonary/critical care, and the patient will be sent to the ICU.  The patient was weaned off insulin drip. Labs went to normal.  Patient had some hypoglycemia on 4/15/19 and insulin cut back. A1c ordered. LFT's were elevated and repeat ordered for 4/16.  Long discussion with patient and his continued IV drug use. He has poor insight.  Patient moved to the floor on 4/16/19.      This patient was near death on presentation with a sugar of 1700 and bicarb < 5.  I had a firm discussion with him- but poor insight. He likely is using drugs and non-compliant with his insulin. He was not sure of his insulin dose at home.  He currently is on Levemir 10 bid.  Follow blood sugars. A1c ordered. Home likely on 4/16. This patient is very needy with constant calls to nursing. His mother likely is enabling his behavior.

## 2019-04-15 NOTE — PLAN OF CARE
"Problem: Adult Inpatient Plan of Care  Goal: Plan of Care Review  Outcome: Ongoing (interventions implemented as appropriate)  Patient AAOx4, VSS, NSR on cardiac monitor. BG monitored q4 hr. Apple juice and D50 25g given x1 for BG 44. At one hour recheck, . UO 1L this shift. No BM. PRN tylenol given x1 for back pain, PRN benadryl given x1 for insomnia, and PRN Zofran given x1 for nausea, relief obtained. Pt repositioned independently in bed. No falls, injury, or skin breakdown this shift. Pt stated, "I am leaving this place today weather they let me or not". Explained to pt the severity of his admission, importance of coordinating with MD on timeline for discharge, and went over diabetes education with teach back. Plan of care reviewed with patient at bedside. Will continue to monitor.        "

## 2019-04-15 NOTE — SUBJECTIVE & OBJECTIVE
Interval History: No new issues.     Review of Systems   Constitutional: Negative for activity change.   HENT: Negative for congestion.    Respiratory: Negative for chest tightness and shortness of breath.    Gastrointestinal: Negative for abdominal pain.   Genitourinary: Negative for difficulty urinating.     Objective:     Vital Signs (Most Recent):  Temp: 98.6 °F (37 °C) (04/15/19 0301)  Pulse: 85 (04/15/19 0630)  Resp: (!) 24 (04/15/19 0630)  BP: 136/71 (04/15/19 0600)  SpO2: 97 % (04/15/19 0630) Vital Signs (24h Range):  Temp:  [98.5 °F (36.9 °C)-98.6 °F (37 °C)] 98.6 °F (37 °C)  Pulse:  [80-93] 85  Resp:  [20-32] 24  SpO2:  [93 %-100 %] 97 %  BP: (113-148)/(57-92) 136/71     Weight: 52.4 kg (115 lb 8.3 oz)  Body mass index is 17.56 kg/m².    Intake/Output Summary (Last 24 hours) at 4/15/2019 0826  Last data filed at 4/15/2019 0400  Gross per 24 hour   Intake 1745.32 ml   Output 2250 ml   Net -504.68 ml      Physical Exam   Constitutional: He is oriented to person, place, and time. He appears well-developed and well-nourished.   HENT:   Head: Normocephalic and atraumatic.   Cardiovascular: Normal rate.   Neurological: He is alert and oriented to person, place, and time.   Skin: Skin is warm and dry.   Psychiatric: He has a normal mood and affect. His behavior is normal.   Nursing note and vitals reviewed.      Significant Labs:   CBC:   Recent Labs   Lab 04/13/19  1001   WBC 22.88*   HGB 11.0*   HCT 45.4   *       Significant Imaging:

## 2019-04-15 NOTE — ASSESSMENT & PLAN NOTE
Presents with K of 7.3.  Insulin/bicarb and fluids should help. Repeat stat labs. Has some peaked T waves. Should correct. Continue tele monitoring.    Resolved.

## 2019-04-15 NOTE — EICU
Notified of back pain and request for sleep aid    53 y/i M history of IVDU, DM brought in due to unresponsiveness and was found to be in DKA.  Complaining of back pain.    Tox screen positive for opiates and benzos    Camera assessment:  Patient seen asleep    · Ordered acetaminophen 650 PO prn pain and Diphenhydramine 25 mg PO prn insomnia

## 2019-04-15 NOTE — ASSESSMENT & PLAN NOTE
Severe DKA. Sugar close to 1700 with bicarb < 5. Stat ABG. Aggressive IV fluids and Insulin. Repeat stat labs now. BMP every 4 hours.  Pulmonary critical care consulted. Not sure of cause- but suspect non-compliance. Infection on differential as patient is a known IV drug user- started on Vanc and Zosyn for now. Blood cultures ordered.  Patient in critical condition.  Monitor closely.  Clinically seems to be better in that he is becoming more alert. Protecting air way.     Resolved. Off insulin drip. Episode of hypoglycemia today.  Cut back insulin. Transfer to floor.

## 2019-04-15 NOTE — ASSESSMENT & PLAN NOTE
Known IV heroin user. Drug screen was + for opioids.  Blood cultures sent. Cover with Vanc and Zosyn for now. Will d/c abx.  Had discussion with patient. Poor insight and will not quit.

## 2019-04-15 NOTE — NURSING
Patient oriented to self, reorientation provided to time, place, and situation. Pt lethargic throughout shift but more alert this AM. Insulin titrated based on hourly BG monitoring. Fluids switched from NS at 150cc/hr to D5 1/2NS with 20mEq KCl at 125cc/hr once BG fell below 200. 12.5g D50 given x1 and D10 @ 15cc/hr started for BG of 60. BMP monitored q4 hr. Pt repositioned indepedently. SCDs in use. UO adeqaute. One BM. No falls, injury, or skin breakdown this shift. Plan of care reviewed with patient at bedside.

## 2019-04-15 NOTE — NURSING
Received patient from ICU, Patient is AAOx4, answers questions, follows commands ,MAEx4 with general weakness, Respiratory even/unlabored patient is on RA, Breath sounds clear, coarse/scattered rhonchi in bilateral bases, VSS, NAD noted, side rails up x2, bed in lowest position, call bell in reach, bed alarm on, patient updated on his plan of care he  verbalized understanding, will continue to monitor, and continue with his plan of care

## 2019-04-15 NOTE — NURSING
Called pharmacy to verify if they had patients home insulin, syringes and blood sugar machine pharmacy stated no they do not, called security they stated they don't take patients medications, Called house supervisor Leta who stated that he will look to see if they have it.

## 2019-04-15 NOTE — ASSESSMENT & PLAN NOTE
Metabolic acidosis- from above. Giving some bicarb in fluids for now. Rechecking stat labs. ABG.  Renal consulted   Resolved.

## 2019-04-15 NOTE — NURSING TRANSFER
Nursing Transfer Note      4/15/2019     Transfer To: 436    Transfer via wheelchair    Transfer with  Transported by Transport    Medicines sent: Levimir pen    Chart send with patient: Yes    Notified: patient has phone and able to call family; mother at bedside    Patient reassessed at: 0800 04/15/2019

## 2019-04-15 NOTE — ASSESSMENT & PLAN NOTE
From DKA and hyperosmolar state.  IV fluids.  Repeat labs now. Then BMP every 4 hours.  Resolved.

## 2019-04-16 VITALS
RESPIRATION RATE: 16 BRPM | SYSTOLIC BLOOD PRESSURE: 134 MMHG | BODY MASS INDEX: 17.5 KG/M2 | TEMPERATURE: 99 F | WEIGHT: 115.5 LBS | HEIGHT: 68 IN | HEART RATE: 89 BPM | OXYGEN SATURATION: 97 % | DIASTOLIC BLOOD PRESSURE: 75 MMHG

## 2019-04-16 PROBLEM — N17.9 ACUTE RENAL FAILURE: Status: RESOLVED | Noted: 2019-04-13 | Resolved: 2019-04-16

## 2019-04-16 PROBLEM — E10.11 DIABETIC KETOACIDOSIS WITH COMA ASSOCIATED WITH TYPE 1 DIABETES MELLITUS: Status: RESOLVED | Noted: 2019-04-13 | Resolved: 2019-04-16

## 2019-04-16 PROBLEM — E87.5 HYPERKALEMIA: Status: RESOLVED | Noted: 2019-04-13 | Resolved: 2019-04-16

## 2019-04-16 PROBLEM — E11.10 DKA (DIABETIC KETOACIDOSES): Status: RESOLVED | Noted: 2019-04-13 | Resolved: 2019-04-16

## 2019-04-16 PROBLEM — E87.1 HYPONATREMIA: Status: RESOLVED | Noted: 2019-04-13 | Resolved: 2019-04-16

## 2019-04-16 PROBLEM — E87.20 ACIDOSIS: Status: RESOLVED | Noted: 2019-04-13 | Resolved: 2019-04-16

## 2019-04-16 LAB
ALBUMIN SERPL BCP-MCNC: 2.7 G/DL (ref 3.5–5.2)
ALP SERPL-CCNC: 99 U/L (ref 55–135)
ALT SERPL W/O P-5'-P-CCNC: 40 U/L (ref 10–44)
ANION GAP SERPL CALC-SCNC: 6 MMOL/L (ref 8–16)
AST SERPL-CCNC: 25 U/L (ref 10–40)
BILIRUB DIRECT SERPL-MCNC: 0.3 MG/DL (ref 0.1–0.3)
BILIRUB SERPL-MCNC: 0.7 MG/DL (ref 0.1–1)
BUN SERPL-MCNC: 13 MG/DL (ref 6–20)
CALCIUM SERPL-MCNC: 9.1 MG/DL (ref 8.7–10.5)
CHLORIDE SERPL-SCNC: 109 MMOL/L (ref 95–110)
CO2 SERPL-SCNC: 24 MMOL/L (ref 23–29)
CREAT SERPL-MCNC: 0.8 MG/DL (ref 0.5–1.4)
EST. GFR  (AFRICAN AMERICAN): >60 ML/MIN/1.73 M^2
EST. GFR  (NON AFRICAN AMERICAN): >60 ML/MIN/1.73 M^2
GLUCOSE SERPL-MCNC: 159 MG/DL (ref 70–110)
POCT GLUCOSE: 230 MG/DL (ref 70–110)
POCT GLUCOSE: 282 MG/DL (ref 70–110)
POTASSIUM SERPL-SCNC: 4.2 MMOL/L (ref 3.5–5.1)
PROT SERPL-MCNC: 7.1 G/DL (ref 6–8.4)
SODIUM SERPL-SCNC: 139 MMOL/L (ref 136–145)

## 2019-04-16 PROCEDURE — 80076 HEPATIC FUNCTION PANEL: CPT

## 2019-04-16 PROCEDURE — 25000003 PHARM REV CODE 250: Performed by: INTERNAL MEDICINE

## 2019-04-16 PROCEDURE — 80048 BASIC METABOLIC PNL TOTAL CA: CPT

## 2019-04-16 PROCEDURE — 94761 N-INVAS EAR/PLS OXIMETRY MLT: CPT

## 2019-04-16 PROCEDURE — S4991 NICOTINE PATCH NONLEGEND: HCPCS | Performed by: INTERNAL MEDICINE

## 2019-04-16 PROCEDURE — 94640 AIRWAY INHALATION TREATMENT: CPT

## 2019-04-16 RX ADMIN — INSULIN ASPART 4 UNITS: 100 INJECTION, SOLUTION INTRAVENOUS; SUBCUTANEOUS at 08:04

## 2019-04-16 RX ADMIN — ACETAMINOPHEN 650 MG: 325 TABLET, FILM COATED ORAL at 06:04

## 2019-04-16 RX ADMIN — ATORVASTATIN CALCIUM 40 MG: 40 TABLET, FILM COATED ORAL at 08:04

## 2019-04-16 RX ADMIN — NICOTINE 1 PATCH: 21 PATCH, EXTENDED RELEASE TRANSDERMAL at 08:04

## 2019-04-16 RX ADMIN — ALBUTEROL SULFATE 2 PUFF: 90 AEROSOL, METERED RESPIRATORY (INHALATION) at 07:04

## 2019-04-16 RX ADMIN — INSULIN DETEMIR 10 UNITS: 100 INJECTION, SOLUTION SUBCUTANEOUS at 08:04

## 2019-04-16 RX ADMIN — GABAPENTIN 300 MG: 300 CAPSULE ORAL at 08:04

## 2019-04-16 NOTE — PLAN OF CARE
Patient remained free of fall and injury this shift, No ivf's,VSS, NAD noted, side rails up x2, bed in lowest position, call bell in reach, bed alarm on, Patient able to make needs known,patient updated on his plan of care he verbalize understanding, will continue to monitor, and continue with his plan of care

## 2019-04-16 NOTE — PROGRESS NOTES
[facility] CASE MANAGEMENT                  WRITTEN DISCHARGE INFORMATION      APPOINTMENTS AND RESOURCES TO HELP YOU MANAGE YOUR CARE AT HOME BASED ON YOUR PREFERENCES:  (If an appointment is not scheduled for you when you leave the hospital, call your doctor to schedule a follow up visit within a week)    Follow-up Information     [facility] In 1 week.    Why:  Please call to schedule your PCP in 1 week   Contact information:  [address]  [place] [address]  [phone]                     Healthy Living Instructions to HELP MANAGE YOUR CARE AT HOME:  Things You are responsible for:  1.    Getting your prescriptions filled   2.    Taking your medications as directed, DO NOT MISS ANY DOSES!  3.    Following the diet and exercise recommended by your doctor  4.    Going to your follow-up doctor appointment. This is important because it allows the doctor to monitor your progress and determine if any changes need to made to your treatment plan.  5. If you have any questions about MANAGING YOUR CARE AT HOME Call the Nurse Care Line for 24/7 Assistance [phone]       Please answer any calls you may receive from [facility]. We want to continue to support you as you manage your healthcare needs. [facility] is happy to have the opportunity to serve you.      Thank you for choosing [facility] for your healthcare needs!  Your [facility] Case Management Team,

## 2019-04-16 NOTE — NURSING
Discharge instructions given to patient patient verbalized understanding, no complaints of sob, cp or discomfort at this time, VSS, NAD. Patient stated he is aware of the signs and symptoms that may require him to seek medical attention

## 2019-04-16 NOTE — PLAN OF CARE
"TN reviewed follow up appointment information as well as  "DKA discharge instructions" handout with patient using teach back. Patient stated he will notify the doctor if he has increased urination, increased thirst and fruity breath.  Patient is in agreement and verbalized an understanding. Placed discharge information in blue discharge folder. TN also reviewed patient responsibility checklist with him using teach back. Patient was able to verbalize his responsibilities after discharge to manage his care at home being   1. Going to follow up appointments   2. Picking up rx from the pharmacy when discharged  3. Taking his medications as prescribed     Patient's nurse,Franci, informed that patient can discharge from  standpoint.        04/16/19 1306   Final Note   Assessment Type Final Discharge Note   Anticipated Discharge Disposition Home   What phone number can be called within the next 1-3 days to see how you are doing after discharge?   ([phone])   Hospital Follow Up  Appt(s) scheduled? Yes   Discharge plans and expectations educations in teach back method with documentation complete? Yes   Right Care Referral Info   Post Acute Recommendation No Care     "

## 2019-04-16 NOTE — DISCHARGE SUMMARY
[facility] Hospital Medicine  Discharge Summary      Patient Name: Cali Mabry  MRN: [id number]  Admission Date: 4/13/2019  Hospital Length of Stay: 3 days  Discharge Date and Time:  04/16/2019 10:43 AM  Attending Physician: Noble Bach MD   Discharging Provider: Noble Bach MD  Primary Care Provider: Stefan Hung MD      HPI:   Mr. Mabry is a 52 yo M known to me who has frequent hospitalizations for different issues- the last in March for DKA.  He was evidently found down by his mother and presents to the ED by EMS  and found in severe DKA with a sugar close to 1700 with a C02< 5 with a K of 7.3.  The patient presented with AMS and no history was obtained from the patient. The history was obtained by my discussion with the ED staff and reviewing the ED record. He was started on aggressive IV fluids, Insulin drip, broad spectrum Abx (patient is a known IV drug user).  After 2 hours in the ED, the patient has become more alert. He is protecting his airway. The case was discussed with pulmonary/critical care, and the patient will be sent to the ICU.      * No surgery found *      Hospital Course:   Mr. Mabry is a 52 yo M known to me who has frequent hospitalizations for different issues- the last in March for DKA.  He was evidently found down by his mother and presents to the ED on 4/13/19 by EMS  and found in severe DKA with a sugar close to 1700 with a C02< 5 with a K of 7.3.  The patient presented with AMS and no history was obtained from the patient. The history was obtained by my discussion with the ED staff and reviewing the ED record. He was started on aggressive IV fluids, Insulin drip, broad spectrum Abx (patient is a known IV drug user).  After 2 hours in the ED, the patient became more alert. He is protecting his airway. The case was discussed with pulmonary/critical care, and the patient admitted to the ICU.  The patient was weaned off insulin drip. Labs went  to normal.  Patient had some hypoglycemia on 4/15/19 and insulin cut back. A1c 8.6.  Long discussion with patient and his continued IV drug use. He has poor insight.  Patient moved to the floor on 4/16/19.  Has remained afebrile and hemodynamically stable.  Glucose much better controlled.  Discussed importance of compliance with insulin and diabetic diet.  He will be discharged home to follow up with PCP.       Consults:       Final Active Diagnoses:    Diagnosis Date Noted POA    Pulmonary nodule [R91.1] 04/13/2019 Yes    Heroin abuse [F11.10] 08/05/2018 Yes    Anemia of chronic disease [D63.8] 11/23/2017 Yes     Chronic    Chronic hepatitis C [B18.2] 11/23/2017 Yes     Chronic    Hyperlipidemia [E78.5] 11/23/2017 Yes     Chronic    Tobacco abuse [Z72.0] 11/23/2017 Yes     Chronic    Essential hypertension [I10] 04/03/2016 Yes     Chronic    Type 2 diabetes mellitus, uncontrolled [E11.65] 04/03/2016 Yes     Chronic    Opioid dependence, continuous [F11.20] 04/27/2014 Yes      Problems Resolved During this Admission:    Diagnosis Date Noted Date Resolved POA    PRINCIPAL PROBLEM:  DKA (diabetic ketoacidoses) [E13.10] 04/13/2019 04/16/2019 Yes    Acidosis [E87.2] 04/13/2019 04/16/2019 Yes    Hyperkalemia [E87.5] 04/13/2019 04/16/2019 Yes    Hyponatremia [E87.1] 04/13/2019 04/16/2019 Yes    Acute renal failure [N17.9] 04/13/2019 04/16/2019 Yes    Diabetic ketoacidosis with coma associated with type 1 diabetes mellitus [E10.11] 04/13/2019 04/16/2019 Yes    Acute renal failure [N17.9] 03/12/2019 03/17/2019 Yes    Diabetic ketoacidosis [E13.10] 07/22/2016 03/17/2019 Yes       Discharged Condition: stable    Disposition: Home or Self Care    Follow Up:  Follow-up Information     Stefan Hung MD In 1 week.    Specialties:  Family Medicine, Wound Care  Contact information:  [facility] [address]  [place] [address]  [phone]                 Patient Instructions:      Diet diabetic     Notify your health care  provider if you experience any of the following:  temperature >100.4     Notify your health care provider if you experience any of the following:  persistent nausea and vomiting or diarrhea     Notify your health care provider if you experience any of the following:  severe uncontrolled pain     Notify your health care provider if you experience any of the following:  difficulty breathing or increased cough     Notify your health care provider if you experience any of the following:  persistent dizziness, light-headedness, or visual disturbances     Notify your health care provider if you experience any of the following:  increased confusion or weakness     Activity as tolerated         Pending Diagnostic Studies:     None         Medications:  Reconciled Home Medications:      Medication List      CONTINUE taking these medications    atorvastatin 40 MG tablet  Commonly known as:  LIPITOR  Take 40 mg by mouth once daily.     ibuprofen 600 MG tablet  Commonly known as:  ADVIL,MOTRIN  Take 1 tablet (600 mg total) by mouth every 6 (six) hours as needed for Pain.     LANTUS U-100 INSULIN 100 unit/mL injection  Generic drug:  insulin glargine  Inject 15 Units into the skin 2 (two) times daily.     nicotine 21 mg/24 hr  Commonly known as:  NICODERM CQ  Place 1 patch onto the skin once daily.     omeprazole 40 MG capsule  Commonly known as:  PriLOSEC  Take 1 capsule (40 mg total) by mouth once daily.            Indwelling Lines/Drains at time of discharge:   Lines/Drains/Airways          None          Time spent on the discharge of patient: >30 minutes  Patient was seen and examined on the date of discharge and determined to be suitable for discharge.         Noble Bach MD  Department of Hospital Medicine  [facility]

## 2019-04-16 NOTE — PROGRESS NOTES
TN contacted Dr. Hung office to schedule patient's pcp appt. TN informed that Dr. Hung doesn't take patient's insurance. Patient informed to schedule his follow up with [facility] in 1 week.

## 2019-04-16 NOTE — NURSING
PIV's discontinued catheters intact, pressure held til hemostasis, gauze and tape applied patient tolerated well

## 2019-04-18 LAB
BACTERIA BLD CULT: NORMAL
BACTERIA BLD CULT: NORMAL

## 2019-04-19 ENCOUNTER — HOSPITAL ENCOUNTER (EMERGENCY)
Facility: HOSPITAL | Age: 54
Discharge: HOME | End: 2019-04-19
Attending: EMERGENCY MEDICINE
Payer: MEDICARE

## 2019-04-19 VITALS
RESPIRATION RATE: 18 BRPM | DIASTOLIC BLOOD PRESSURE: 63 MMHG | BODY MASS INDEX: 22.73 KG/M2 | OXYGEN SATURATION: 98 % | WEIGHT: 150 LBS | SYSTOLIC BLOOD PRESSURE: 114 MMHG | HEART RATE: 79 BPM | TEMPERATURE: 98 F | HEIGHT: 68 IN

## 2019-04-19 DIAGNOSIS — F19.10 POLYSUBSTANCE ABUSE: ICD-10-CM

## 2019-04-19 DIAGNOSIS — R41.82 ALTERED MENTAL STATUS, UNSPECIFIED ALTERED MENTAL STATUS TYPE: Primary | ICD-10-CM

## 2019-04-19 DIAGNOSIS — R73.9 HYPERGLYCEMIA: ICD-10-CM

## 2019-04-19 LAB
ALBUMIN SERPL BCP-MCNC: 3.3 G/DL (ref 3.5–5.2)
ALLENS TEST: ABNORMAL
ALP SERPL-CCNC: 93 U/L (ref 55–135)
ALT SERPL W/O P-5'-P-CCNC: 32 U/L (ref 10–44)
AMMONIA PLAS-SCNC: 43 UMOL/L (ref 10–50)
AMPHET+METHAMPHET UR QL: NEGATIVE
ANION GAP SERPL CALC-SCNC: 10 MMOL/L (ref 8–16)
APAP SERPL-MCNC: <3 UG/ML (ref 10–20)
AST SERPL-CCNC: 27 U/L (ref 10–40)
B-OH-BUTYR BLD STRIP-SCNC: 0.3 MMOL/L (ref 0–0.5)
BACTERIA #/AREA URNS HPF: NORMAL /HPF
BARBITURATES UR QL SCN>200 NG/ML: NEGATIVE
BASOPHILS # BLD AUTO: 0.01 K/UL (ref 0–0.2)
BASOPHILS NFR BLD: 0.2 % (ref 0–1.9)
BENZODIAZ UR QL SCN>200 NG/ML: NORMAL
BILIRUB SERPL-MCNC: 0.4 MG/DL (ref 0.1–1)
BILIRUB UR QL STRIP: NEGATIVE
BUN SERPL-MCNC: 19 MG/DL (ref 6–20)
BZE UR QL SCN: NORMAL
CALCIUM SERPL-MCNC: 8.9 MG/DL (ref 8.7–10.5)
CANNABINOIDS UR QL SCN: NEGATIVE
CHLORIDE SERPL-SCNC: 103 MMOL/L (ref 95–110)
CLARITY UR: CLEAR
CO2 SERPL-SCNC: 22 MMOL/L (ref 23–29)
COLOR UR: YELLOW
CREAT SERPL-MCNC: 1.1 MG/DL (ref 0.5–1.4)
CREAT UR-MCNC: 85.5 MG/DL (ref 23–375)
DIFFERENTIAL METHOD: ABNORMAL
EOSINOPHIL # BLD AUTO: 0.2 K/UL (ref 0–0.5)
EOSINOPHIL NFR BLD: 4.3 % (ref 0–8)
ERYTHROCYTE [DISTWIDTH] IN BLOOD BY AUTOMATED COUNT: 14.6 % (ref 11.5–14.5)
EST. GFR  (AFRICAN AMERICAN): >60 ML/MIN/1.73 M^2
EST. GFR  (NON AFRICAN AMERICAN): >60 ML/MIN/1.73 M^2
ETHANOL SERPL-MCNC: <10 MG/DL
GLUCOSE SERPL-MCNC: 268 MG/DL (ref 70–110)
GLUCOSE UR QL STRIP: ABNORMAL
HCO3 UR-SCNC: 31 MMOL/L (ref 24–28)
HCT VFR BLD AUTO: 38.4 % (ref 40–54)
HGB BLD-MCNC: 12.2 G/DL (ref 14–18)
HGB UR QL STRIP: NEGATIVE
KETONES UR QL STRIP: NEGATIVE
LACTATE SERPL-SCNC: 1.4 MMOL/L (ref 0.5–2.2)
LEUKOCYTE ESTERASE UR QL STRIP: NEGATIVE
LYMPHOCYTES # BLD AUTO: 1.5 K/UL (ref 1–4.8)
LYMPHOCYTES NFR BLD: 26.9 % (ref 18–48)
MCH RBC QN AUTO: 26.7 PG (ref 27–31)
MCHC RBC AUTO-ENTMCNC: 31.8 G/DL (ref 32–36)
MCV RBC AUTO: 84 FL (ref 82–98)
METHADONE UR QL SCN>300 NG/ML: NEGATIVE
MICROSCOPIC COMMENT: NORMAL
MONOCYTES # BLD AUTO: 0.5 K/UL (ref 0.3–1)
MONOCYTES NFR BLD: 9.3 % (ref 4–15)
NEUTROPHILS # BLD AUTO: 3.3 K/UL (ref 1.8–7.7)
NEUTROPHILS NFR BLD: 59.3 % (ref 38–73)
NITRITE UR QL STRIP: NEGATIVE
OPIATES UR QL SCN: NORMAL
PCO2 BLDA: 61.4 MMHG (ref 35–45)
PCP UR QL SCN>25 NG/ML: NEGATIVE
PH SMN: 7.31 [PH] (ref 7.35–7.45)
PH UR STRIP: 5 [PH] (ref 5–8)
PLATELET # BLD AUTO: 261 K/UL (ref 150–350)
PMV BLD AUTO: 10 FL (ref 9.2–12.9)
PO2 BLDA: 16 MMHG (ref 40–60)
POC BE: 3 MMOL/L
POC SATURATED O2: 18 % (ref 95–100)
POC TCO2: 33 MMOL/L (ref 24–29)
POCT GLUCOSE: 307 MG/DL (ref 70–110)
POTASSIUM SERPL-SCNC: 4.1 MMOL/L (ref 3.5–5.1)
PROT SERPL-MCNC: 8 G/DL (ref 6–8.4)
PROT UR QL STRIP: NEGATIVE
RBC # BLD AUTO: 4.57 M/UL (ref 4.6–6.2)
SALICYLATES SERPL-MCNC: <5 MG/DL (ref 15–30)
SAMPLE: ABNORMAL
SITE: ABNORMAL
SODIUM SERPL-SCNC: 135 MMOL/L (ref 136–145)
SP GR UR STRIP: 1.01 (ref 1–1.03)
TOXICOLOGY INFORMATION: NORMAL
TSH SERPL DL<=0.005 MIU/L-ACNC: 1.63 UIU/ML (ref 0.4–4)
URN SPEC COLLECT METH UR: ABNORMAL
UROBILINOGEN UR STRIP-ACNC: NEGATIVE EU/DL
WBC # BLD AUTO: 5.57 K/UL (ref 3.9–12.7)
YEAST URNS QL MICRO: NORMAL

## 2019-04-19 PROCEDURE — 80320 DRUG SCREEN QUANTALCOHOLS: CPT

## 2019-04-19 PROCEDURE — 99900035 HC TECH TIME PER 15 MIN (STAT)

## 2019-04-19 PROCEDURE — 80053 COMPREHEN METABOLIC PANEL: CPT

## 2019-04-19 PROCEDURE — 82140 ASSAY OF AMMONIA: CPT

## 2019-04-19 PROCEDURE — 85025 COMPLETE CBC W/AUTO DIFF WBC: CPT

## 2019-04-19 PROCEDURE — P9612 CATHETERIZE FOR URINE SPEC: HCPCS

## 2019-04-19 PROCEDURE — 81000 URINALYSIS NONAUTO W/SCOPE: CPT

## 2019-04-19 PROCEDURE — 84443 ASSAY THYROID STIM HORMONE: CPT

## 2019-04-19 PROCEDURE — 80307 DRUG TEST PRSMV CHEM ANLYZR: CPT

## 2019-04-19 PROCEDURE — 80329 ANALGESICS NON-OPIOID 1 OR 2: CPT

## 2019-04-19 PROCEDURE — 83605 ASSAY OF LACTIC ACID: CPT

## 2019-04-19 PROCEDURE — 96361 HYDRATE IV INFUSION ADD-ON: CPT

## 2019-04-19 PROCEDURE — 96360 HYDRATION IV INFUSION INIT: CPT

## 2019-04-19 PROCEDURE — 99285 EMERGENCY DEPT VISIT HI MDM: CPT | Mod: 25

## 2019-04-19 PROCEDURE — 82962 GLUCOSE BLOOD TEST: CPT

## 2019-04-19 PROCEDURE — 93005 ELECTROCARDIOGRAM TRACING: CPT

## 2019-04-19 PROCEDURE — 82010 KETONE BODYS QUAN: CPT

## 2019-04-19 PROCEDURE — 25000003 PHARM REV CODE 250: Performed by: EMERGENCY MEDICINE

## 2019-04-19 PROCEDURE — 93010 ELECTROCARDIOGRAM REPORT: CPT | Mod: ,,, | Performed by: INTERNAL MEDICINE

## 2019-04-19 PROCEDURE — 93010 EKG 12-LEAD: ICD-10-PCS | Mod: ,,, | Performed by: INTERNAL MEDICINE

## 2019-04-19 PROCEDURE — 82803 BLOOD GASES ANY COMBINATION: CPT

## 2019-04-19 RX ADMIN — SODIUM CHLORIDE 2000 ML: 0.9 INJECTION, SOLUTION INTRAVENOUS at 08:04

## 2019-04-19 NOTE — ED NOTES
Patient sister arrives to pick patient home. Patient made aware that Dr. Gomez was waiting for other labs to return. Patient sister states that she has somewhere to go and ready to leave. Dr. Gomez made aware. Dr Gomez informed patient's sister that all the labs were not back yet for him to determine the issue. Patient states that nothing is wrong with him and is ready to leave. Per Dr. Gomez ok to discharge patient. Patient is ambulatory. VSS. NKD. Patient ambulated to discharge station accompanied by myself.

## 2019-04-19 NOTE — ED TRIAGE NOTES
Pt presents to ED via EMS after family called for altered mental status. Per EMS, pt is a heroin user. Pt states he does not know his last use. Pt states that he does not want to be in the hospital. Pt is upset that he was taken to hospital and states that he told EMS not to bring him here. Pt c/o left leg pain from abrasions. Pt is dozing off while talking.  Will continue to monitor.

## 2019-04-19 NOTE — ED PROVIDER NOTES
"Encounter Date: 4/19/2019    SCRIBE #1 NOTE: I, Brenda Lu, am scribing for, and in the presence of,  Leon Gomez MD. I have scribed the following portions of the note - Other sections scribed: HPI/ROS/PE.       History     Chief Complaint   Patient presents with    Altered Mental Status     HX of heroin use, EMS reports "sister called 911 due to change in mental status", pt awakes to verbal stimuli. No acute distress noted. VSS.     CC: Altered Mental Status      HPI: This 53 y.o. male with a medical hx of DM, hepatitis, pancreatitis, and HTN presents to the ED via EMS after family called for AMS. Pt states, "I have no idea why I am here." He reports that he got up to get coffee this morning and that next thing he remembers is "waking up and I'm in the hospital again." He states, "I'm just aggravated more than anything and I'm tired" as he refers to being d/c from the hospital recently. States he lives nearby. He reports back pain as well as L knee pain. States knee pain began after falling not too long ago. States he takes insulin. He has a hx of heroin use with last injection being 2 days ago. He smokes cigarettes. No PSHx. No allergies to medications. No recent ETOH. Otherwise, hx is limited secondary to pt's current state.     The history is provided by the patient and the EMS personnel. The history is limited by the condition of the patient. No  was used.     Review of patient's allergies indicates:  No Known Allergies  Past Medical History:   Diagnosis Date    Diabetes mellitus     Hepatitis     HTN (hypertension)     Pancreatitis      Past Surgical History:   Procedure Laterality Date    CARDIAC SURGERY  1999    stent placed. ([facility])    ESOPHAGOGASTRODUODENOSCOPY (EGD) Left 4/4/2016    Performed by Huber C[facility] at [facility]    SHOULDER SURGERY  2013    right shoulder, spur removal.     Family History   Problem Relation Age of Onset    Asthma Mother  "     Social History     Tobacco Use    Smoking status: Current Every Day Smoker     Packs/day: 1.00     Years: 20.00     Pack years: 20.00     Types: Cigarettes     Start date: 4/3/1981    Smokeless tobacco: Never Used   Substance Use Topics    Alcohol use: No     Alcohol/week: 0.0 oz    Drug use: Yes     Types: IV, Marijuana, Heroin     Comment: currently, used today, herion     Review of Systems   Unable to perform ROS: Mental status change   Musculoskeletal: Positive for arthralgias (L knee) and back pain.       Physical Exam     Initial Vitals [04/19/19 0741]   BP Pulse Resp Temp SpO2   108/69 91 14 97.8 °F (36.6 °C) 98 %      MAP       --         Physical Exam    Nursing note and vitals reviewed.  Constitutional: Vital signs are normal. He appears well-developed and well-nourished. He is active.  Non-toxic appearance.   He is somnolent.    HENT:   Head: Normocephalic and atraumatic.   Eyes:   Pinpoint pupils.    Neck: Trachea normal. Neck supple.   Cardiovascular: Normal rate and regular rhythm.   Pulmonary/Chest: Breath sounds normal. No respiratory distress.   Abdominal: Soft. Normal appearance and bowel sounds are normal. He exhibits no distension. There is no tenderness.   Musculoskeletal: Normal range of motion. He exhibits no edema.   Neurological: He is alert.   He wakes up and answers questions but then goes back to sleep.    Skin: Skin is warm, dry and intact.   Scratches to the L shoulder. Abrasions to the L knee.    Psychiatric: He has a normal mood and affect.         ED Course   Procedures  Labs Reviewed   CBC W/ AUTO DIFFERENTIAL - Abnormal; Notable for the following components:       Result Value    RBC 4.57 (*)     Hemoglobin 12.2 (*)     Hematocrit 38.4 (*)     MCH 26.7 (*)     MCHC 31.8 (*)     RDW 14.6 (*)     All other components within normal limits   COMPREHENSIVE METABOLIC PANEL - Abnormal; Notable for the following components:    Sodium 135 (*)     CO2 22 (*)     Glucose 268 (*)      Albumin 3.3 (*)     All other components within normal limits   URINALYSIS, REFLEX TO URINE CULTURE - Abnormal; Notable for the following components:    Glucose, UA 3+ (*)     All other components within normal limits    Narrative:     Preferred Collection Type->Urine, Clean Catch   POCT GLUCOSE - Abnormal; Notable for the following components:    POCT Glucose 307 (*)     All other components within normal limits   ISTAT PROCEDURE - Abnormal; Notable for the following components:    POC PH 7.311 (*)     POC PCO2 61.4 (*)     POC PO2 16 (*)     POC HCO3 31.0 (*)     POC SATURATED O2 18 (*)     POC TCO2 33 (*)     All other components within normal limits   BETA - HYDROXYBUTYRATE, SERUM   AMMONIA   LACTIC ACID, PLASMA   URINALYSIS MICROSCOPIC    Narrative:     Preferred Collection Type->Urine, Clean Catch   DRUG SCREEN PANEL, URINE EMERGENCY   ALCOHOL,MEDICAL (ETHANOL)   SALICYLATE LEVEL   ACETAMINOPHEN LEVEL   TSH   POCT GLUCOSE MONITORING CONTINUOUS     EKG Readings: (Independently Interpreted)   Initial Reading: No STEMI. Rhythm: Normal Sinus Rhythm. Heart Rate: 84. ST Segments: Normal ST Segments. T Waves: Normal.     ECG Results          EKG 12-lead (In process)  Result time 04/19/19 08:30:53    In process by Interface, Lab In [facility] (04/19/19 08:30:53)                 Narrative:    Test Reason : R73.9,    Vent. Rate : 084 BPM     Atrial Rate : 084 BPM     P-R Int : 148 ms          QRS Dur : 082 ms      QT Int : 388 ms       P-R-T Axes : 082 071 088 degrees     QTc Int : 458 ms    Normal sinus rhythm  Normal ECG  When compared with ECG of 13-APR-2019 09:36,  Significant changes have occurred    Referred By: System System           Confirmed By:                   In process by Interface, Lab In [facility] (04/19/19 08:29:14)                 Narrative:    Test Reason : R73.9,    Vent. Rate : 084 BPM     Atrial Rate : 084 BPM     P-R Int : 148 ms          QRS Dur : 082 ms      QT Int : 388 ms       P-R-T Axes : 082  071 088 degrees     QTc Int : 458 ms    Normal sinus rhythm  Normal ECG  When compared with ECG of 13-APR-2019 09:36,  Significant changes have occurred    Referred By: System System           Confirmed By:                             Imaging Results          X-Ray Chest AP Portable (Final result)  Result time 04/19/19 08:18:35    Final result by Fredi Danielle MD (04/19/19 08:18:35)                 Impression:      No acute abnormality.      Electronically signed by: Fredi Danielle MD  Date:    04/19/2019  Time:    08:18             Narrative:    EXAMINATION:  XR CHEST AP PORTABLE    CLINICAL HISTORY:  altered level of consciousness;    TECHNIQUE:  Single frontal view of the chest was performed.    COMPARISON:  Chest radiograph 04/13/2019    FINDINGS:  The lungs are clear, with normal appearance of pulmonary vasculature and no pleural effusion or pneumothorax.    The cardiac silhouette is normal in size. The hilar and mediastinal contours are unremarkable.    Bones are intact.                                 Medical Decision Making:   Initial Assessment:   Patient arrived via EMS with AMS. physical exam remarkable for sleepy and GCS 14    Given patient has normal O2, hypoxia less likely.  BgL is 268 with normal anion gap and beta hydroxybutyrate, making hypoglycemia, DKA, HHS less likely    DDX still includes, but is not limited to: EtOH, electrolyte abnormality (low/high Na, low/high Mg, hypocalcemia, hypophosphatemia), abnormal thyroid function, infection/sepsis, drug overdose, hypothermia, uremia, trauma, encephalopathy/encephalitis, CVA, SAH or other ICH, postictal state 2/2 seizure, psych.    Labs  Urine  ECG  CXR    Highest on my differential is opiate abuse.  Patient has a history of this.  Patient is sleepy but will wake when I speak loud to him.  Pending basic labs on the patient to look for any major abnormalities.  No trauma to his head noted.  Patient denies any trauma.  Patient is frustrated  that he is here.    CBC and chemistry and beta hydroxybutyrate all reassuring other than slightly elevated blood glucose    9:53 AM  Sister is here and requesting patient be taking her home because he is back to his baseline.  Pending a couple labs.  If Labs are normal discharge patient home.    Rest of labs were still pending but patient and sister would like to go home.  They politely requested for me to discharge them even without all the labs being resulted.  They stated that they understood the predicament that I could not do a full evaluation without the labs coming back.  Patient is ambulatory and tolerating p.o. with no complaints. He has decision-making capacity.  Patient discharged home with instructions please refrain for any polysubstance abuse. I discussed with the patient the diagnosis, treatment plan, indications for return to the emergency department, and for expected follow-up. The patient verbalized an understanding. The patient is asked if there are any questions or concerns. We discuss the case, until all issues are addressed to the patient's satisfaction. Patient understands and is agreeable to the plan.   Leon Gomez      Clinical Tests:   Lab Tests: Ordered and Reviewed  Radiological Study: Reviewed and Ordered  Medical Tests: Ordered and Reviewed            Scribe Attestation:   Scribe #1: I performed the above scribed service and the documentation accurately describes the services I performed. I attest to the accuracy of the note.    Attending Attestation:           Physician Attestation for Scribe:  Physician Attestation Statement for Scribe #1: I, Leon Gomez MD, reviewed documentation, as scribed by Brenda Lu in my presence, and it is both accurate and complete.                    Clinical Impression:       ICD-10-CM ICD-9-CM   1. Altered mental status, unspecified altered mental status type R41.82 780.97   2. Hyperglycemia R73.9 790.29   3. Polysubstance abuse F19.10  305.90                                Leon Gomez MD  04/19/19 1056

## 2019-12-18 ENCOUNTER — HOSPITAL ENCOUNTER (EMERGENCY)
Facility: HOSPITAL | Age: 54
Discharge: HOME | End: 2019-12-18
Attending: EMERGENCY MEDICINE
Payer: MEDICARE

## 2019-12-18 VITALS
HEART RATE: 100 BPM | HEIGHT: 68 IN | BODY MASS INDEX: 22.73 KG/M2 | SYSTOLIC BLOOD PRESSURE: 142 MMHG | WEIGHT: 150 LBS | OXYGEN SATURATION: 96 % | TEMPERATURE: 98 F | RESPIRATION RATE: 21 BRPM | DIASTOLIC BLOOD PRESSURE: 74 MMHG

## 2019-12-18 DIAGNOSIS — T50.7X5A: ICD-10-CM

## 2019-12-18 DIAGNOSIS — R11.2 NON-INTRACTABLE VOMITING WITH NAUSEA, UNSPECIFIED VOMITING TYPE: ICD-10-CM

## 2019-12-18 DIAGNOSIS — F11.11 HISTORY OF HEROIN ABUSE: ICD-10-CM

## 2019-12-18 DIAGNOSIS — R41.82 ALTERED MENTAL STATUS, UNSPECIFIED ALTERED MENTAL STATUS TYPE: Primary | ICD-10-CM

## 2019-12-18 LAB
ALBUMIN SERPL BCP-MCNC: 3.7 G/DL (ref 3.5–5.2)
ALLENS TEST: ABNORMAL
ALP SERPL-CCNC: 97 U/L (ref 55–135)
ALT SERPL W/O P-5'-P-CCNC: 21 U/L (ref 10–44)
ANION GAP SERPL CALC-SCNC: 8 MMOL/L (ref 8–16)
AST SERPL-CCNC: 20 U/L (ref 10–40)
BASOPHILS # BLD AUTO: 0.01 K/UL (ref 0–0.2)
BASOPHILS NFR BLD: 0.2 % (ref 0–1.9)
BILIRUB SERPL-MCNC: 0.6 MG/DL (ref 0.1–1)
BUN SERPL-MCNC: 23 MG/DL (ref 6–20)
CALCIUM SERPL-MCNC: 8.8 MG/DL (ref 8.7–10.5)
CHLORIDE SERPL-SCNC: 100 MMOL/L (ref 95–110)
CO2 SERPL-SCNC: 25 MMOL/L (ref 23–29)
CREAT SERPL-MCNC: 1.1 MG/DL (ref 0.5–1.4)
DELSYS: ABNORMAL
DIFFERENTIAL METHOD: ABNORMAL
EOSINOPHIL # BLD AUTO: 0.1 K/UL (ref 0–0.5)
EOSINOPHIL NFR BLD: 1.2 % (ref 0–8)
ERYTHROCYTE [DISTWIDTH] IN BLOOD BY AUTOMATED COUNT: 13.2 % (ref 11.5–14.5)
EST. GFR  (AFRICAN AMERICAN): >60 ML/MIN/1.73 M^2
EST. GFR  (NON AFRICAN AMERICAN): >60 ML/MIN/1.73 M^2
GLUCOSE SERPL-MCNC: 254 MG/DL (ref 70–110)
HCO3 UR-SCNC: 27.2 MMOL/L (ref 24–28)
HCT VFR BLD AUTO: 39.3 % (ref 40–54)
HGB BLD-MCNC: 12.8 G/DL (ref 14–18)
IMM GRANULOCYTES # BLD AUTO: 0.02 K/UL (ref 0–0.04)
IMM GRANULOCYTES NFR BLD AUTO: 0.4 % (ref 0–0.5)
LIPASE SERPL-CCNC: 9 U/L (ref 4–60)
LYMPHOCYTES # BLD AUTO: 1 K/UL (ref 1–4.8)
LYMPHOCYTES NFR BLD: 17.6 % (ref 18–48)
MCH RBC QN AUTO: 28.1 PG (ref 27–31)
MCHC RBC AUTO-ENTMCNC: 32.6 G/DL (ref 32–36)
MCV RBC AUTO: 86 FL (ref 82–98)
MODE: ABNORMAL
MONOCYTES # BLD AUTO: 0.9 K/UL (ref 0.3–1)
MONOCYTES NFR BLD: 15.8 % (ref 4–15)
NEUTROPHILS # BLD AUTO: 3.7 K/UL (ref 1.8–7.7)
NEUTROPHILS NFR BLD: 64.8 % (ref 38–73)
NRBC BLD-RTO: 0 /100 WBC
PCO2 BLDA: 49.3 MMHG (ref 35–45)
PH SMN: 7.35 [PH] (ref 7.35–7.45)
PLATELET # BLD AUTO: 208 K/UL (ref 150–350)
PMV BLD AUTO: 9.5 FL (ref 9.2–12.9)
PO2 BLDA: 48 MMHG (ref 40–60)
POC BE: 1 MMOL/L
POC SATURATED O2: 81 % (ref 95–100)
POC TCO2: 29 MMOL/L (ref 24–29)
POCT GLUCOSE: 245 MG/DL (ref 70–110)
POTASSIUM SERPL-SCNC: 4.1 MMOL/L (ref 3.5–5.1)
PROT SERPL-MCNC: 8.1 G/DL (ref 6–8.4)
RBC # BLD AUTO: 4.56 M/UL (ref 4.6–6.2)
SAMPLE: ABNORMAL
SITE: ABNORMAL
SODIUM SERPL-SCNC: 133 MMOL/L (ref 136–145)
WBC # BLD AUTO: 5.64 K/UL (ref 3.9–12.7)

## 2019-12-18 PROCEDURE — 96374 THER/PROPH/DIAG INJ IV PUSH: CPT

## 2019-12-18 PROCEDURE — 85025 COMPLETE CBC W/AUTO DIFF WBC: CPT

## 2019-12-18 PROCEDURE — 83690 ASSAY OF LIPASE: CPT

## 2019-12-18 PROCEDURE — 96361 HYDRATE IV INFUSION ADD-ON: CPT

## 2019-12-18 PROCEDURE — 96372 THER/PROPH/DIAG INJ SC/IM: CPT

## 2019-12-18 PROCEDURE — 63600175 PHARM REV CODE 636 W HCPCS: Performed by: EMERGENCY MEDICINE

## 2019-12-18 PROCEDURE — 99900035 HC TECH TIME PER 15 MIN (STAT)

## 2019-12-18 PROCEDURE — 82803 BLOOD GASES ANY COMBINATION: CPT

## 2019-12-18 PROCEDURE — 82962 GLUCOSE BLOOD TEST: CPT

## 2019-12-18 PROCEDURE — 99284 EMERGENCY DEPT VISIT MOD MDM: CPT | Mod: 25

## 2019-12-18 PROCEDURE — 80053 COMPREHEN METABOLIC PANEL: CPT

## 2019-12-18 RX ORDER — PROMETHAZINE HYDROCHLORIDE 25 MG/1
25 TABLET ORAL
Status: DISCONTINUED | OUTPATIENT
Start: 2019-12-18 | End: 2019-12-18 | Stop reason: HOSPADM

## 2019-12-18 RX ORDER — NALOXONE HYDROCHLORIDE 4 MG/.1ML
SPRAY NASAL
Qty: 1 EACH | Refills: 11 | Status: SHIPPED | OUTPATIENT
Start: 2019-12-18 | End: 2019-12-18 | Stop reason: SDUPTHER

## 2019-12-18 RX ORDER — DICYCLOMINE HYDROCHLORIDE 10 MG/ML
20 INJECTION INTRAMUSCULAR
Status: COMPLETED | OUTPATIENT
Start: 2019-12-18 | End: 2019-12-18

## 2019-12-18 RX ORDER — GABAPENTIN 300 MG/1
300 CAPSULE ORAL
Status: ON HOLD | COMMUNITY
Start: 2017-11-25 | End: 2020-03-27

## 2019-12-18 RX ORDER — ONDANSETRON 2 MG/ML
8 INJECTION INTRAMUSCULAR; INTRAVENOUS
Status: COMPLETED | OUTPATIENT
Start: 2019-12-18 | End: 2019-12-18

## 2019-12-18 RX ORDER — INSULIN ASPART 100 [IU]/ML
INJECTION, SOLUTION INTRAVENOUS; SUBCUTANEOUS
Status: ON HOLD | COMMUNITY
Start: 2017-11-25 | End: 2020-03-27

## 2019-12-18 RX ORDER — DIPHENHYDRAMINE HYDROCHLORIDE 50 MG/ML
50 INJECTION INTRAMUSCULAR; INTRAVENOUS
Status: DISCONTINUED | OUTPATIENT
Start: 2019-12-18 | End: 2019-12-18 | Stop reason: HOSPADM

## 2019-12-18 RX ORDER — NALOXONE HYDROCHLORIDE 4 MG/.1ML
SPRAY NASAL
Qty: 1 EACH | Refills: 11 | Status: ON HOLD | OUTPATIENT
Start: 2019-12-18 | End: 2020-03-27

## 2019-12-18 RX ADMIN — DICYCLOMINE HYDROCHLORIDE 20 MG: 20 INJECTION, SOLUTION INTRAMUSCULAR at 02:12

## 2019-12-18 RX ADMIN — SODIUM CHLORIDE 1000 ML: 0.9 INJECTION, SOLUTION INTRAVENOUS at 02:12

## 2019-12-18 RX ADMIN — ONDANSETRON HYDROCHLORIDE 8 MG: 2 SOLUTION INTRAMUSCULAR; INTRAVENOUS at 02:12

## 2019-12-18 NOTE — DISCHARGE INSTRUCTIONS
Please return immediately if you get worse or if new problems develop.  Please follow-up with your primary care doctor this week.  Rest.  Lots of clear liquids only while you are nauseated.  Rest.  Please try to reduce your heroin usage.

## 2019-12-18 NOTE — ED PROVIDER NOTES
"Encounter Date: 12/18/2019    SCRIBE #1 NOTE: I, Dulce Victor, am scribing for, and in the presence of,  Cullen Beauchamp MD. I have scribed the following portions of the note - Other sections scribed: HPI, ROS.       History     Chief Complaint   Patient presents with    Drug Overdose     EMS reports pt was found unresponsive by friends, known hx of heroin abuse. 2mg narcan IN with +response. pt c/o pain in the legs at this time.      This is a 53 y/o male who has DM, Hepatitis, Pancreatitis, and HTN presents to the ED for an evaluation for nausea, emesis, and generalized abdominal pain that began prior to arrival.  Upon arrival to the ED, the patient is dry heaving into a emesis bag and calling out explicit words when asked questions.  When asked of any drug use, the patient replies "I didn't do drugs this just happened out the blue moon".  No alleviating factors.      Of note, the patient arrived in the care of EMS with reports that he was found by a friend and has a prior h/o substance abuse.  Patient was reported to have been administered Narcan while with EMS.     The history is provided by the patient.     Review of patient's allergies indicates:  No Known Allergies  Past Medical History:   Diagnosis Date    Diabetes mellitus     Hepatitis     HTN (hypertension)     Pancreatitis      Past Surgical History:   Procedure Laterality Date    CARDIAC SURGERY  1999    stent placed. ([facility])    SHOULDER SURGERY  2013    right shoulder, spur removal.     Family History   Problem Relation Age of Onset    Asthma Mother      Social History     Tobacco Use    Smoking status: Current Every Day Smoker     Packs/day: 1.00     Years: 20.00     Pack years: 20.00     Types: Cigarettes     Start date: 4/3/1981    Smokeless tobacco: Never Used   Substance Use Topics    Alcohol use: No     Alcohol/week: 0.0 standard drinks    Drug use: Yes     Types: IV, Marijuana, Heroin     Comment: currently, used today, " herion     Review of Systems   Constitutional: Negative for chills and fever.   HENT: Negative for congestion, ear pain, rhinorrhea and sore throat.    Eyes: Negative for pain and visual disturbance.   Respiratory: Negative for cough and shortness of breath.    Cardiovascular: Negative for chest pain.   Gastrointestinal: Positive for abdominal pain, nausea and vomiting. Negative for diarrhea.   Genitourinary: Negative for dysuria.   Musculoskeletal: Negative for back pain and neck pain.   Skin: Negative for rash.   Neurological: Negative for headaches.       Physical Exam     Initial Vitals [12/18/19 1341]   BP Pulse Resp Temp SpO2   125/83 (!) 118 20 97.8 °F (36.6 °C) 97 %      MAP       --         Physical Exam  The patient was examined specifically for the following:   General:No significant distress, Good color, Warm and dry. Head and neck:Scalp atraumatic, Neck supple. Neurological:Appropriate conversation, Gross motor deficits. Eyes:Conjugate gaze, Clear corneas. ENT: No epistaxis. Cardiac: Regular rate and rhythm, Grossly normal heart tones. Pulmonary: Wheezing, Rales. Gastrointestinal: Abdominal tenderness, Abdominal distention. Musculoskeletal: Extremity deformity, Apparent pain with range of motion of the joints. Skin: Rash.   The findings on examination were normal except for the following:  The patient is awake alert and bright.  He will not answer questions.  He is retching into an emesis bag.  He is screaming axilla tips.  He will answer limited questions.  His pants are covered with emesis.  The lungs are clear.  The heart tones are remarkable for regular tachycardia.  The abdomen is diffusely tender.  The extremities are nontender. There is no apparent pain with range of motion of any joints.  ED Course   Procedures  Labs Reviewed   COMPREHENSIVE METABOLIC PANEL - Abnormal; Notable for the following components:       Result Value    Sodium 133 (*)     Glucose 254 (*)     BUN, Bld 23 (*)     All other  components within normal limits   CBC W/ AUTO DIFFERENTIAL - Abnormal; Notable for the following components:    RBC 4.56 (*)     Hemoglobin 12.8 (*)     Hematocrit 39.3 (*)     Lymph% 17.6 (*)     Mono% 15.8 (*)     All other components within normal limits   POCT GLUCOSE - Abnormal; Notable for the following components:    POCT Glucose 245 (*)     All other components within normal limits   ISTAT PROCEDURE - Abnormal; Notable for the following components:    POC PCO2 49.3 (*)     POC SATURATED O2 81 (*)     All other components within normal limits   LIPASE   URINALYSIS, REFLEX TO URINE CULTURE   POCT GLUCOSE MONITORING CONTINUOUS          Imaging Results    None       Medical decision making:  Given the above, this patient presents to the emergency room with a history of being unresponsive in the field.  His friends called EMS.  EMS gave him 2 mg of Narcan IV.  The patient woke up.  He arrives to the emergency room was somewhat bizarre behavior.  He is screaming and screaming explatives.  He has nausea vomiting and retching.  He improved with treatment with Phenergan and Benadryl.  The patient has a history of heroin abuse.  He has a history of abusing other drugs.  I presume that this is heroin intoxication and narcotic withdrawal from Narcan IV.  The patient adamantly denies drug usage.  He has no complaints at 5:16 p.m..  Laboratory evaluation is unremarkable. The patient has not yet made urine.  He would like to go home.                                     Clinical Impression:       ICD-10-CM ICD-9-CM   1. Altered mental status, unspecified altered mental status type R41.82 780.97   2. History of heroin abuse F11.11 305.53   3. Non-intractable vomiting with nausea, unspecified vomiting type R11.2 787.01   4. Adverse effect of naloxone, initial encounter T50.7X5A E940.1              I personally performed the services described in this documentation.  All medical record  entries made by the scribrich are at my  direction and in my presence.  Signed, Dr. Quynh Beauchamp MD  12/18/19 5943

## 2019-12-18 NOTE — ED TRIAGE NOTES
Pt arrived to the ED due to a heroin overdose. EMS reports pt was breathing 3 times a minutes. EMS reports giving 2 of narcan. Pt states his legs are cramping bilaterally. Pt also reports n/v

## 2019-12-19 NOTE — ED NOTES
Mother, henok, attempted to be contacted - no answer  - [phone]  Sister on emergency contacts on face sheet attempted to be contacted - no answer - [phone]

## 2020-01-01 ENCOUNTER — HOSPITAL ENCOUNTER (EMERGENCY)
Facility: HOSPITAL | Age: 55
Discharge: HOME | End: 2020-01-01
Attending: EMERGENCY MEDICINE
Payer: MEDICARE

## 2020-01-01 VITALS
HEART RATE: 84 BPM | TEMPERATURE: 98 F | DIASTOLIC BLOOD PRESSURE: 93 MMHG | HEIGHT: 68 IN | BODY MASS INDEX: 22.73 KG/M2 | RESPIRATION RATE: 20 BRPM | SYSTOLIC BLOOD PRESSURE: 145 MMHG | WEIGHT: 150 LBS | OXYGEN SATURATION: 97 %

## 2020-01-01 DIAGNOSIS — T40.1X1A HEROIN OVERDOSE, ACCIDENTAL OR UNINTENTIONAL, INITIAL ENCOUNTER: Primary | ICD-10-CM

## 2020-01-01 DIAGNOSIS — T40.2X1A OPIOID OVERDOSE, ACCIDENTAL OR UNINTENTIONAL, INITIAL ENCOUNTER: ICD-10-CM

## 2020-01-01 LAB — GLUCOSE SERPL-MCNC: 311 MG/DL (ref 70–110)

## 2020-01-01 PROCEDURE — 93005 ELECTROCARDIOGRAM TRACING: CPT

## 2020-01-01 PROCEDURE — 63600175 PHARM REV CODE 636 W HCPCS: Performed by: EMERGENCY MEDICINE

## 2020-01-01 PROCEDURE — 93010 ELECTROCARDIOGRAM REPORT: CPT | Mod: ,,, | Performed by: INTERNAL MEDICINE

## 2020-01-01 PROCEDURE — 96361 HYDRATE IV INFUSION ADD-ON: CPT

## 2020-01-01 PROCEDURE — 82962 GLUCOSE BLOOD TEST: CPT

## 2020-01-01 PROCEDURE — 93010 EKG 12-LEAD: ICD-10-PCS | Mod: ,,, | Performed by: INTERNAL MEDICINE

## 2020-01-01 PROCEDURE — 99284 EMERGENCY DEPT VISIT MOD MDM: CPT | Mod: 25

## 2020-01-01 PROCEDURE — 96360 HYDRATION IV INFUSION INIT: CPT

## 2020-01-01 RX ORDER — NALOXONE HYDROCHLORIDE 1 MG/ML
INJECTION INTRAMUSCULAR; INTRAVENOUS; SUBCUTANEOUS
Qty: 2 ML | Refills: 11 | Status: ON HOLD | OUTPATIENT
Start: 2020-01-01 | End: 2020-03-27

## 2020-01-01 RX ADMIN — SODIUM CHLORIDE 1000 ML: 9 INJECTION, SOLUTION INTRAVENOUS at 12:01

## 2020-01-01 NOTE — ED TRIAGE NOTES
Pt arrived to ED with c/o drug overdose. Per EMS pt was found in kitchen with agonal breath sounds and unresponsive, gave 0.5mg narcan with positive results. Pt aaox4. NAD. Pt admits to taking heroin.    normal...

## 2020-01-01 NOTE — ED PROVIDER NOTES
Encounter Date: 1/1/2020       History     Chief Complaint   Patient presents with    Drug Overdose     EMS reports friend called 911 due to pt unresponsive, 0.5mg narcan given pt now GCS 15. pt admits to shooting heroin      HPI     54-year-old male with history of insulin-dependent diabetes, hepatitis, hypertension and drug use presents the ED after accidental overdose at home.  Patient denies any SI or HI.  He has been clean for a while and this week has relapsed twice.  He was found by his friend at home and called EMS.  Patient responded to Narcan bolus.  Patient adamantly denies any head trauma.  He does not feel any confusion or pain anywhere.  EMS reports an initial blood sugar 400.  Patient states that this is usual for him.      Review of patient's allergies indicates:  No Known Allergies  Past Medical History:   Diagnosis Date    Diabetes mellitus     Hepatitis     HTN (hypertension)     Pancreatitis      Past Surgical History:   Procedure Laterality Date    [facility]    SHOULDER SURGERY  2013    right shoulder, spur removal.     Family History   Problem Relation Age of Onset    Asthma Mother      Social History     Tobacco Use    Smoking status: Current Every Day Smoker     Packs/day: 1.00     Years: 20.00     Pack years: 20.00     Types: Cigarettes     Start date: 4/3/1981    Smokeless tobacco: Never Used   Substance Use Topics    Alcohol use: No     Alcohol/week: 0.0 standard drinks    Drug use: Yes     Types: IV, Marijuana, Heroin     Comment: currently, used today, herion     Review of Systems   Constitutional: Negative for fever.   HENT: Negative for sore throat.    Respiratory: Negative for shortness of breath.    Cardiovascular: Negative for chest pain.   Gastrointestinal: Negative for nausea.   Genitourinary: Negative for dysuria.   Musculoskeletal: Negative for back pain.   Skin: Negative for rash.   Neurological: Negative for weakness.    Hematological: Does not bruise/bleed easily.       Physical Exam     Initial Vitals [01/01/20 1227]   BP Pulse Resp Temp SpO2   138/79 104 12 97.6 °F (36.4 °C) 96 %      MAP       --         Physical Exam    Constitutional: He appears well-nourished.   HENT:   Head: Normocephalic.   Eyes: Conjunctivae and EOM are normal. Pupils are equal, round, and reactive to light. No scleral icterus.   Neck: Normal range of motion. Neck supple.   Cardiovascular: Normal heart sounds and intact distal pulses.   No murmur heard.  Pulmonary/Chest: Breath sounds normal. No respiratory distress.   Abdominal: Soft. He exhibits no distension. There is no tenderness.   Musculoskeletal: Normal range of motion. He exhibits no edema.   Neurological: He is alert and oriented to person, place, and time. He has normal strength.   Symmetric stregnth bilateral upper and lower extremities   Normal EOM  No sensory deficits  No aphasia or dysarthria  No cognitive deficits noted, following all instructions without difficulty     Skin: Skin is warm and dry.   Psychiatric: He has a normal mood and affect. His behavior is normal. Judgment and thought content normal.         ED Course   Procedures  Labs Reviewed   POCT GLUCOSE MONITORING CONTINUOUS        ECG Results          EKG 12-lead (In process)  Result time 01/01/20 14:51:06    In process by Interface, Lab In [facility] (01/01/20 14:51:06)                 Narrative:    Test Reason : T40.2X1A,    Vent. Rate : 097 BPM     Atrial Rate : 097 BPM     P-R Int : 146 ms          QRS Dur : 084 ms      QT Int : 360 ms       P-R-T Axes : 084 077 087 degrees     QTc Int : 457 ms    Normal sinus rhythm  Normal ECG  When compared with ECG of 19-APR-2019 08:21,  No significant change was found    Referred By: [email or web address]   SELF           Confirmed By:                             Imaging Results    None       54-year-old male with a history of drug use with an accidental overdose today.  Patient also found to be  hyperglycemic which is not uncommon for him per patient.  Patient was alert and oriented when he 1st arrived.  Neuro exam was reassuring and nonfocal. I feel comfortable not CT his head at this time.  He was amenable to staying and making sure the Narcan did not wear off.  Patient has been observed for 2 hr with no need to follow sleep.  He has remained alert the entire time he has been here and pleasantly conversational.  His blood sugar now is 300.  He would rather go home and just take his insulin as he knows how to work his sliding scale.  He needs to follow up with his doctor.  He needs to stay off the drugs and [facility] information was given just in case he needs to establish care somewhere.  VANDA Tee MD  3:03 PM                                               Clinical Impression:       ICD-10-CM ICD-9-CM   1. Heroin overdose, accidental or unintentional, initial encounter T40.1X1A 965.01     E850.0   2. Opioid overdose, accidental or unintentional, initial encounter T40.2X1A 965.00     E850.2                             Joana Tee MD  01/01/20 1508

## 2020-02-22 ENCOUNTER — HOSPITAL ENCOUNTER (INPATIENT)
Facility: HOSPITAL | Age: 55
LOS: 15 days | Discharge: HOME HEALTH | DRG: 871 | End: 2020-03-08
Attending: EMERGENCY MEDICINE | Admitting: EMERGENCY MEDICINE
Payer: MEDICARE

## 2020-02-22 DIAGNOSIS — R07.9 CHEST PAIN: ICD-10-CM

## 2020-02-22 DIAGNOSIS — I38 ENDOCARDITIS: ICD-10-CM

## 2020-02-22 DIAGNOSIS — N17.9 AKI (ACUTE KIDNEY INJURY): ICD-10-CM

## 2020-02-22 DIAGNOSIS — E11.65 UNCONTROLLED TYPE 2 DIABETES MELLITUS WITH HYPERGLYCEMIA: ICD-10-CM

## 2020-02-22 DIAGNOSIS — R13.10 DYSPHAGIA, UNSPECIFIED TYPE: ICD-10-CM

## 2020-02-22 DIAGNOSIS — J18.9 PNEUMONIA DUE TO INFECTIOUS ORGANISM, UNSPECIFIED LATERALITY, UNSPECIFIED PART OF LUNG: ICD-10-CM

## 2020-02-22 DIAGNOSIS — I48.91 ATRIAL FIBRILLATION WITH RVR: ICD-10-CM

## 2020-02-22 DIAGNOSIS — E08.10 DIABETIC KETOACIDOSIS WITHOUT COMA ASSOCIATED WITH DIABETES MELLITUS DUE TO UNDERLYING CONDITION: Primary | ICD-10-CM

## 2020-02-22 DIAGNOSIS — R00.0 TACHYCARDIA: ICD-10-CM

## 2020-02-22 DIAGNOSIS — R10.9 ABDOMINAL PAIN: ICD-10-CM

## 2020-02-22 LAB
ALBUMIN SERPL BCP-MCNC: 2.6 G/DL (ref 3.5–5.2)
ALLENS TEST: ABNORMAL
ALP SERPL-CCNC: 129 U/L (ref 55–135)
ALT SERPL W/O P-5'-P-CCNC: 8 U/L (ref 10–44)
AMPHET+METHAMPHET UR QL: NEGATIVE
ANION GAP SERPL CALC-SCNC: 10 MMOL/L (ref 8–16)
ANION GAP SERPL CALC-SCNC: 10 MMOL/L (ref 8–16)
ANION GAP SERPL CALC-SCNC: 13 MMOL/L (ref 8–16)
ANION GAP SERPL CALC-SCNC: 20 MMOL/L (ref 8–16)
ANION GAP SERPL CALC-SCNC: 26 MMOL/L (ref 8–16)
ANION GAP SERPL CALC-SCNC: 28 MMOL/L (ref 8–16)
ANISOCYTOSIS BLD QL SMEAR: SLIGHT
ANISOCYTOSIS BLD QL SMEAR: SLIGHT
AST SERPL-CCNC: 8 U/L (ref 10–40)
BACTERIA #/AREA URNS HPF: ABNORMAL /HPF
BARBITURATES UR QL SCN>200 NG/ML: NEGATIVE
BASOPHILS NFR BLD: 0 % (ref 0–1.9)
BASOPHILS NFR BLD: 0 % (ref 0–1.9)
BENZODIAZ UR QL SCN>200 NG/ML: NEGATIVE
BILIRUB SERPL-MCNC: 0.6 MG/DL (ref 0.1–1)
BILIRUB UR QL STRIP: NEGATIVE
BUN SERPL-MCNC: 20 MG/DL (ref 6–20)
BUN SERPL-MCNC: 21 MG/DL (ref 6–20)
BUN SERPL-MCNC: 22 MG/DL (ref 6–20)
BZE UR QL SCN: NEGATIVE
CALCIUM SERPL-MCNC: 8.5 MG/DL (ref 8.7–10.5)
CALCIUM SERPL-MCNC: 8.6 MG/DL (ref 8.7–10.5)
CALCIUM SERPL-MCNC: 9.4 MG/DL (ref 8.7–10.5)
CALCIUM SERPL-MCNC: 9.4 MG/DL (ref 8.7–10.5)
CANNABINOIDS UR QL SCN: NEGATIVE
CHLORIDE SERPL-SCNC: 100 MMOL/L (ref 95–110)
CHLORIDE SERPL-SCNC: 102 MMOL/L (ref 95–110)
CHLORIDE SERPL-SCNC: 102 MMOL/L (ref 95–110)
CHLORIDE SERPL-SCNC: 103 MMOL/L (ref 95–110)
CHLORIDE SERPL-SCNC: 103 MMOL/L (ref 95–110)
CHLORIDE SERPL-SCNC: 95 MMOL/L (ref 95–110)
CLARITY UR: CLEAR
CO2 SERPL-SCNC: 10 MMOL/L (ref 23–29)
CO2 SERPL-SCNC: 16 MMOL/L (ref 23–29)
CO2 SERPL-SCNC: 18 MMOL/L (ref 23–29)
CO2 SERPL-SCNC: 18 MMOL/L (ref 23–29)
CO2 SERPL-SCNC: 6 MMOL/L (ref 23–29)
CO2 SERPL-SCNC: 7 MMOL/L (ref 23–29)
COLOR UR: ABNORMAL
CREAT SERPL-MCNC: 1.5 MG/DL (ref 0.5–1.4)
CREAT SERPL-MCNC: 1.6 MG/DL (ref 0.5–1.4)
CREAT SERPL-MCNC: 1.6 MG/DL (ref 0.5–1.4)
CREAT SERPL-MCNC: 1.7 MG/DL (ref 0.5–1.4)
CREAT SERPL-MCNC: 1.7 MG/DL (ref 0.5–1.4)
CREAT SERPL-MCNC: 1.9 MG/DL (ref 0.5–1.4)
CREAT UR-MCNC: 24.5 MG/DL (ref 23–375)
DELSYS: ABNORMAL
DIFFERENTIAL METHOD: ABNORMAL
DIFFERENTIAL METHOD: ABNORMAL
EOSINOPHIL NFR BLD: 0 % (ref 0–8)
EOSINOPHIL NFR BLD: 0 % (ref 0–8)
ERYTHROCYTE [DISTWIDTH] IN BLOOD BY AUTOMATED COUNT: 13.5 % (ref 11.5–14.5)
ERYTHROCYTE [DISTWIDTH] IN BLOOD BY AUTOMATED COUNT: 14 % (ref 11.5–14.5)
EST. GFR  (AFRICAN AMERICAN): 46 ML/MIN/1.73 M^2
EST. GFR  (AFRICAN AMERICAN): 52 ML/MIN/1.73 M^2
EST. GFR  (AFRICAN AMERICAN): 52 ML/MIN/1.73 M^2
EST. GFR  (AFRICAN AMERICAN): 56 ML/MIN/1.73 M^2
EST. GFR  (AFRICAN AMERICAN): 56 ML/MIN/1.73 M^2
EST. GFR  (AFRICAN AMERICAN): >60 ML/MIN/1.73 M^2
EST. GFR  (NON AFRICAN AMERICAN): 39 ML/MIN/1.73 M^2
EST. GFR  (NON AFRICAN AMERICAN): 45 ML/MIN/1.73 M^2
EST. GFR  (NON AFRICAN AMERICAN): 45 ML/MIN/1.73 M^2
EST. GFR  (NON AFRICAN AMERICAN): 48 ML/MIN/1.73 M^2
EST. GFR  (NON AFRICAN AMERICAN): 48 ML/MIN/1.73 M^2
EST. GFR  (NON AFRICAN AMERICAN): 52 ML/MIN/1.73 M^2
ESTIMATED AVG GLUCOSE: 269 MG/DL (ref 68–131)
ETHANOL SERPL-MCNC: <10 MG/DL
GLUCOSE SERPL-MCNC: 170 MG/DL (ref 70–110)
GLUCOSE SERPL-MCNC: 170 MG/DL (ref 70–110)
GLUCOSE SERPL-MCNC: 173 MG/DL (ref 70–110)
GLUCOSE SERPL-MCNC: 319 MG/DL (ref 70–110)
GLUCOSE SERPL-MCNC: 614 MG/DL (ref 70–110)
GLUCOSE SERPL-MCNC: 635 MG/DL (ref 70–110)
GLUCOSE SERPL-MCNC: >500 MG/DL (ref 70–110)
GLUCOSE SERPL-MCNC: >500 MG/DL (ref 70–110)
GLUCOSE UR QL STRIP: ABNORMAL
HBA1C MFR BLD HPLC: 11 % (ref 4–5.6)
HCO3 UR-SCNC: 6.4 MMOL/L (ref 24–28)
HCT VFR BLD AUTO: 32.7 % (ref 40–54)
HCT VFR BLD AUTO: 39.6 % (ref 40–54)
HGB BLD-MCNC: 10.5 G/DL (ref 14–18)
HGB BLD-MCNC: 12 G/DL (ref 14–18)
HGB UR QL STRIP: ABNORMAL
HYALINE CASTS #/AREA URNS LPF: 1 /LPF
IMM GRANULOCYTES # BLD AUTO: ABNORMAL K/UL (ref 0–0.04)
IMM GRANULOCYTES # BLD AUTO: ABNORMAL K/UL (ref 0–0.04)
IMM GRANULOCYTES NFR BLD AUTO: ABNORMAL % (ref 0–0.5)
IMM GRANULOCYTES NFR BLD AUTO: ABNORMAL % (ref 0–0.5)
KETONES UR QL STRIP: ABNORMAL
LACTATE SERPL-SCNC: 2.1 MMOL/L (ref 0.5–2.2)
LACTATE SERPL-SCNC: 2.3 MMOL/L (ref 0.5–2.2)
LEUKOCYTE ESTERASE UR QL STRIP: NEGATIVE
LIPASE SERPL-CCNC: 40 U/L (ref 4–60)
LYMPHOCYTES NFR BLD: 3 % (ref 18–48)
LYMPHOCYTES NFR BLD: 7 % (ref 18–48)
MAGNESIUM SERPL-MCNC: 1.6 MG/DL (ref 1.6–2.6)
MAGNESIUM SERPL-MCNC: 1.9 MG/DL (ref 1.6–2.6)
MCH RBC QN AUTO: 25.4 PG (ref 27–31)
MCH RBC QN AUTO: 26 PG (ref 27–31)
MCHC RBC AUTO-ENTMCNC: 30.3 G/DL (ref 32–36)
MCHC RBC AUTO-ENTMCNC: 32.1 G/DL (ref 32–36)
MCV RBC AUTO: 79 FL (ref 82–98)
MCV RBC AUTO: 86 FL (ref 82–98)
METAMYELOCYTES NFR BLD MANUAL: 4 %
METAMYELOCYTES NFR BLD MANUAL: 4 %
METHADONE UR QL SCN>300 NG/ML: NEGATIVE
MICROSCOPIC COMMENT: ABNORMAL
MODE: ABNORMAL
MONOCYTES NFR BLD: 12 % (ref 4–15)
MONOCYTES NFR BLD: 7 % (ref 4–15)
MYELOCYTES NFR BLD MANUAL: 2 %
MYELOCYTES NFR BLD MANUAL: 4 %
NEUTROPHILS NFR BLD: 26 % (ref 38–73)
NEUTROPHILS NFR BLD: 66 % (ref 38–73)
NEUTS BAND NFR BLD MANUAL: 18 %
NEUTS BAND NFR BLD MANUAL: 47 %
NITRITE UR QL STRIP: NEGATIVE
NRBC BLD-RTO: 0 /100 WBC
NRBC BLD-RTO: 0 /100 WBC
OPIATES UR QL SCN: NEGATIVE
PCO2 BLDA: 18.8 MMHG (ref 35–45)
PCP UR QL SCN>25 NG/ML: NEGATIVE
PH SMN: 7.14 [PH] (ref 7.35–7.45)
PH UR STRIP: 5 [PH] (ref 5–8)
PHOSPHATE SERPL-MCNC: 1.4 MG/DL (ref 2.7–4.5)
PHOSPHATE SERPL-MCNC: 3.9 MG/DL (ref 2.7–4.5)
PHOSPHATE SERPL-MCNC: 4.6 MG/DL (ref 2.7–4.5)
PLATELET # BLD AUTO: 283 K/UL (ref 150–350)
PLATELET # BLD AUTO: 481 K/UL (ref 150–350)
PLATELET BLD QL SMEAR: ABNORMAL
PLATELET BLD QL SMEAR: ABNORMAL
PMV BLD AUTO: 10.2 FL (ref 9.2–12.9)
PMV BLD AUTO: 9.4 FL (ref 9.2–12.9)
PO2 BLDA: 50 MMHG (ref 40–60)
POC BE: -21 MMOL/L
POC SATURATED O2: 74 % (ref 95–100)
POC TCO2: 7 MMOL/L (ref 24–29)
POCT GLUCOSE: 156 MG/DL (ref 70–110)
POCT GLUCOSE: 166 MG/DL (ref 70–110)
POCT GLUCOSE: 171 MG/DL (ref 70–110)
POCT GLUCOSE: 171 MG/DL (ref 70–110)
POCT GLUCOSE: 174 MG/DL (ref 70–110)
POCT GLUCOSE: 183 MG/DL (ref 70–110)
POCT GLUCOSE: 195 MG/DL (ref 70–110)
POCT GLUCOSE: 269 MG/DL (ref 70–110)
POCT GLUCOSE: 307 MG/DL (ref 70–110)
POCT GLUCOSE: 347 MG/DL (ref 70–110)
POCT GLUCOSE: 459 MG/DL (ref 70–110)
POCT GLUCOSE: 465 MG/DL (ref 70–110)
POIKILOCYTOSIS BLD QL SMEAR: SLIGHT
POIKILOCYTOSIS BLD QL SMEAR: SLIGHT
POLYCHROMASIA BLD QL SMEAR: ABNORMAL
POTASSIUM SERPL-SCNC: 4 MMOL/L (ref 3.5–5.1)
POTASSIUM SERPL-SCNC: 4 MMOL/L (ref 3.5–5.1)
POTASSIUM SERPL-SCNC: 4.1 MMOL/L (ref 3.5–5.1)
POTASSIUM SERPL-SCNC: 4.2 MMOL/L (ref 3.5–5.1)
POTASSIUM SERPL-SCNC: 5.1 MMOL/L (ref 3.5–5.1)
POTASSIUM SERPL-SCNC: 5.1 MMOL/L (ref 3.5–5.1)
PROT SERPL-MCNC: 8.8 G/DL (ref 6–8.4)
PROT UR QL STRIP: ABNORMAL
RBC # BLD AUTO: 4.14 M/UL (ref 4.6–6.2)
RBC # BLD AUTO: 4.62 M/UL (ref 4.6–6.2)
RBC #/AREA URNS HPF: 1 /HPF (ref 0–4)
SAMPLE: ABNORMAL
SITE: ABNORMAL
SODIUM SERPL-SCNC: 130 MMOL/L (ref 136–145)
SODIUM SERPL-SCNC: 131 MMOL/L (ref 136–145)
SODIUM SERPL-SCNC: 132 MMOL/L (ref 136–145)
SODIUM SERPL-SCNC: 132 MMOL/L (ref 136–145)
SP GR UR STRIP: 1.02 (ref 1–1.03)
SP02: 97
TOXIC GRANULES BLD QL SMEAR: PRESENT
TOXICOLOGY INFORMATION: NORMAL
TROPONIN I SERPL DL<=0.01 NG/ML-MCNC: 0.01 NG/ML (ref 0–0.03)
URN SPEC COLLECT METH UR: ABNORMAL
UROBILINOGEN UR STRIP-ACNC: NEGATIVE EU/DL
WBC # BLD AUTO: 12.39 K/UL (ref 3.9–12.7)
WBC # BLD AUTO: 2.76 K/UL (ref 3.9–12.7)
WBC #/AREA URNS HPF: 1 /HPF (ref 0–5)
WBC TOXIC VACUOLES BLD QL SMEAR: PRESENT
YEAST URNS QL MICRO: ABNORMAL

## 2020-02-22 PROCEDURE — 80053 COMPREHEN METABOLIC PANEL: CPT

## 2020-02-22 PROCEDURE — 94640 AIRWAY INHALATION TREATMENT: CPT

## 2020-02-22 PROCEDURE — 93005 ELECTROCARDIOGRAM TRACING: CPT

## 2020-02-22 PROCEDURE — 25000003 PHARM REV CODE 250: Performed by: EMERGENCY MEDICINE

## 2020-02-22 PROCEDURE — 80320 DRUG SCREEN QUANTALCOHOLS: CPT

## 2020-02-22 PROCEDURE — 63600175 PHARM REV CODE 636 W HCPCS: Performed by: INTERNAL MEDICINE

## 2020-02-22 PROCEDURE — 80048 BASIC METABOLIC PNL TOTAL CA: CPT

## 2020-02-22 PROCEDURE — 82962 GLUCOSE BLOOD TEST: CPT

## 2020-02-22 PROCEDURE — S4991 NICOTINE PATCH NONLEGEND: HCPCS | Performed by: EMERGENCY MEDICINE

## 2020-02-22 PROCEDURE — 63600175 PHARM REV CODE 636 W HCPCS: Performed by: EMERGENCY MEDICINE

## 2020-02-22 PROCEDURE — 36415 COLL VENOUS BLD VENIPUNCTURE: CPT

## 2020-02-22 PROCEDURE — 84100 ASSAY OF PHOSPHORUS: CPT

## 2020-02-22 PROCEDURE — 93010 ELECTROCARDIOGRAM REPORT: CPT | Mod: 76,,, | Performed by: INTERNAL MEDICINE

## 2020-02-22 PROCEDURE — 93010 EKG 12-LEAD: ICD-10-PCS | Mod: ,,, | Performed by: INTERNAL MEDICINE

## 2020-02-22 PROCEDURE — 83735 ASSAY OF MAGNESIUM: CPT | Mod: 91

## 2020-02-22 PROCEDURE — S5010 5% DEXTROSE AND 0.45% SALINE: HCPCS | Performed by: INTERNAL MEDICINE

## 2020-02-22 PROCEDURE — 63600175 PHARM REV CODE 636 W HCPCS: Performed by: HOSPITALIST

## 2020-02-22 PROCEDURE — 83690 ASSAY OF LIPASE: CPT

## 2020-02-22 PROCEDURE — 80307 DRUG TEST PRSMV CHEM ANLYZR: CPT

## 2020-02-22 PROCEDURE — 83605 ASSAY OF LACTIC ACID: CPT | Mod: 91

## 2020-02-22 PROCEDURE — 83036 HEMOGLOBIN GLYCOSYLATED A1C: CPT

## 2020-02-22 PROCEDURE — 84484 ASSAY OF TROPONIN QUANT: CPT

## 2020-02-22 PROCEDURE — 87186 SC STD MICRODIL/AGAR DIL: CPT

## 2020-02-22 PROCEDURE — 93010 ELECTROCARDIOGRAM REPORT: CPT | Mod: ,,, | Performed by: INTERNAL MEDICINE

## 2020-02-22 PROCEDURE — 82803 BLOOD GASES ANY COMBINATION: CPT

## 2020-02-22 PROCEDURE — 83605 ASSAY OF LACTIC ACID: CPT

## 2020-02-22 PROCEDURE — 99291 CRITICAL CARE FIRST HOUR: CPT | Mod: 25

## 2020-02-22 PROCEDURE — 87040 BLOOD CULTURE FOR BACTERIA: CPT

## 2020-02-22 PROCEDURE — 25000242 PHARM REV CODE 250 ALT 637 W/ HCPCS: Performed by: EMERGENCY MEDICINE

## 2020-02-22 PROCEDURE — 99900035 HC TECH TIME PER 15 MIN (STAT)

## 2020-02-22 PROCEDURE — 96365 THER/PROPH/DIAG IV INF INIT: CPT

## 2020-02-22 PROCEDURE — 85007 BL SMEAR W/DIFF WBC COUNT: CPT

## 2020-02-22 PROCEDURE — 20000000 HC ICU ROOM

## 2020-02-22 PROCEDURE — 80048 BASIC METABOLIC PNL TOTAL CA: CPT | Mod: 91

## 2020-02-22 PROCEDURE — 81000 URINALYSIS NONAUTO W/SCOPE: CPT

## 2020-02-22 PROCEDURE — 83735 ASSAY OF MAGNESIUM: CPT

## 2020-02-22 PROCEDURE — 96375 TX/PRO/DX INJ NEW DRUG ADDON: CPT

## 2020-02-22 PROCEDURE — 87077 CULTURE AEROBIC IDENTIFY: CPT

## 2020-02-22 PROCEDURE — 84100 ASSAY OF PHOSPHORUS: CPT | Mod: 91

## 2020-02-22 PROCEDURE — 25000003 PHARM REV CODE 250: Performed by: INTERNAL MEDICINE

## 2020-02-22 PROCEDURE — 85027 COMPLETE CBC AUTOMATED: CPT | Mod: 91

## 2020-02-22 PROCEDURE — 96361 HYDRATE IV INFUSION ADD-ON: CPT

## 2020-02-22 RX ORDER — SODIUM CHLORIDE, SODIUM LACTATE, POTASSIUM CHLORIDE, CALCIUM CHLORIDE 600; 310; 30; 20 MG/100ML; MG/100ML; MG/100ML; MG/100ML
1000 INJECTION, SOLUTION INTRAVENOUS CONTINUOUS
Status: DISCONTINUED | OUTPATIENT
Start: 2020-02-22 | End: 2020-02-22

## 2020-02-22 RX ORDER — ONDANSETRON 2 MG/ML
4 INJECTION INTRAMUSCULAR; INTRAVENOUS
Status: COMPLETED | OUTPATIENT
Start: 2020-02-22 | End: 2020-02-22

## 2020-02-22 RX ORDER — VANCOMYCIN HCL IN 5 % DEXTROSE 1G/250ML
15 PLASTIC BAG, INJECTION (ML) INTRAVENOUS
Status: DISCONTINUED | OUTPATIENT
Start: 2020-02-23 | End: 2020-02-24

## 2020-02-22 RX ORDER — ATORVASTATIN CALCIUM 40 MG/1
40 TABLET, FILM COATED ORAL DAILY
Status: DISCONTINUED | OUTPATIENT
Start: 2020-02-22 | End: 2020-03-08 | Stop reason: HOSPADM

## 2020-02-22 RX ORDER — DEXTROSE MONOHYDRATE AND SODIUM CHLORIDE 5; .45 G/100ML; G/100ML
INJECTION, SOLUTION INTRAVENOUS CONTINUOUS
Status: DISCONTINUED | OUTPATIENT
Start: 2020-02-22 | End: 2020-02-23

## 2020-02-22 RX ORDER — SODIUM CHLORIDE 9 MG/ML
INJECTION, SOLUTION INTRAVENOUS CONTINUOUS
Status: DISCONTINUED | OUTPATIENT
Start: 2020-02-22 | End: 2020-02-22

## 2020-02-22 RX ORDER — ONDANSETRON 2 MG/ML
4 INJECTION INTRAMUSCULAR; INTRAVENOUS EVERY 6 HOURS PRN
Status: DISCONTINUED | OUTPATIENT
Start: 2020-02-22 | End: 2020-03-08 | Stop reason: HOSPADM

## 2020-02-22 RX ORDER — VANCOMYCIN HCL IN 5 % DEXTROSE 1G/250ML
15 PLASTIC BAG, INJECTION (ML) INTRAVENOUS
Status: COMPLETED | OUTPATIENT
Start: 2020-02-22 | End: 2020-02-22

## 2020-02-22 RX ORDER — ACETAMINOPHEN 325 MG/1
650 TABLET ORAL EVERY 6 HOURS PRN
Status: DISCONTINUED | OUTPATIENT
Start: 2020-02-22 | End: 2020-03-08 | Stop reason: HOSPADM

## 2020-02-22 RX ORDER — SODIUM CHLORIDE 0.9 % (FLUSH) 0.9 %
10 SYRINGE (ML) INJECTION
Status: DISCONTINUED | OUTPATIENT
Start: 2020-02-22 | End: 2020-03-08 | Stop reason: HOSPADM

## 2020-02-22 RX ORDER — AMOXICILLIN 250 MG
1 CAPSULE ORAL 2 TIMES DAILY
Status: DISCONTINUED | OUTPATIENT
Start: 2020-02-22 | End: 2020-03-08 | Stop reason: HOSPADM

## 2020-02-22 RX ORDER — BENZONATATE 100 MG/1
100 CAPSULE ORAL ONCE
Status: COMPLETED | OUTPATIENT
Start: 2020-02-22 | End: 2020-02-22

## 2020-02-22 RX ORDER — POTASSIUM CHLORIDE 20 MEQ/1
40 TABLET, EXTENDED RELEASE ORAL ONCE
Status: COMPLETED | OUTPATIENT
Start: 2020-02-22 | End: 2020-02-22

## 2020-02-22 RX ORDER — ENOXAPARIN SODIUM 100 MG/ML
40 INJECTION SUBCUTANEOUS EVERY 24 HOURS
Status: DISCONTINUED | OUTPATIENT
Start: 2020-02-22 | End: 2020-03-08 | Stop reason: HOSPADM

## 2020-02-22 RX ORDER — ACETAMINOPHEN 325 MG/1
650 TABLET ORAL EVERY 4 HOURS PRN
Status: DISCONTINUED | OUTPATIENT
Start: 2020-02-22 | End: 2020-03-08 | Stop reason: HOSPADM

## 2020-02-22 RX ORDER — PANTOPRAZOLE SODIUM 40 MG/1
40 TABLET, DELAYED RELEASE ORAL DAILY
Status: DISCONTINUED | OUTPATIENT
Start: 2020-02-22 | End: 2020-03-08 | Stop reason: HOSPADM

## 2020-02-22 RX ORDER — ACETAMINOPHEN 500 MG
1000 TABLET ORAL
Status: COMPLETED | OUTPATIENT
Start: 2020-02-22 | End: 2020-02-22

## 2020-02-22 RX ORDER — GABAPENTIN 300 MG/1
300 CAPSULE ORAL NIGHTLY
Status: DISCONTINUED | OUTPATIENT
Start: 2020-02-22 | End: 2020-02-23

## 2020-02-22 RX ORDER — IBUPROFEN 200 MG
1 TABLET ORAL DAILY
Status: DISCONTINUED | OUTPATIENT
Start: 2020-02-22 | End: 2020-03-08 | Stop reason: HOSPADM

## 2020-02-22 RX ORDER — IPRATROPIUM BROMIDE AND ALBUTEROL SULFATE 2.5; .5 MG/3ML; MG/3ML
3 SOLUTION RESPIRATORY (INHALATION)
Status: COMPLETED | OUTPATIENT
Start: 2020-02-22 | End: 2020-02-22

## 2020-02-22 RX ORDER — VANCOMYCIN HCL IN 5 % DEXTROSE 1G/250ML
15 PLASTIC BAG, INJECTION (ML) INTRAVENOUS
Status: DISCONTINUED | OUTPATIENT
Start: 2020-02-22 | End: 2020-02-22

## 2020-02-22 RX ADMIN — ATORVASTATIN CALCIUM 40 MG: 40 TABLET, FILM COATED ORAL at 01:02

## 2020-02-22 RX ADMIN — DOCUSATE SODIUM - SENNOSIDES 1 TABLET: 50; 8.6 TABLET, FILM COATED ORAL at 01:02

## 2020-02-22 RX ADMIN — BENZONATATE 100 MG: 100 CAPSULE ORAL at 05:02

## 2020-02-22 RX ADMIN — SODIUM CHLORIDE 7 UNITS/HR: 9 INJECTION, SOLUTION INTRAVENOUS at 08:02

## 2020-02-22 RX ADMIN — SODIUM CHLORIDE, SODIUM LACTATE, POTASSIUM CHLORIDE, AND CALCIUM CHLORIDE 1000 ML: .6; .31; .03; .02 INJECTION, SOLUTION INTRAVENOUS at 08:02

## 2020-02-22 RX ADMIN — ONDANSETRON HYDROCHLORIDE 4 MG: 2 SOLUTION INTRAMUSCULAR; INTRAVENOUS at 05:02

## 2020-02-22 RX ADMIN — VANCOMYCIN HYDROCHLORIDE 1000 MG: 1 INJECTION, POWDER, LYOPHILIZED, FOR SOLUTION INTRAVENOUS at 08:02

## 2020-02-22 RX ADMIN — PIPERACILLIN AND TAZOBACTAM 4.5 G: 4; .5 INJECTION, POWDER, FOR SOLUTION INTRAVENOUS at 11:02

## 2020-02-22 RX ADMIN — SODIUM CHLORIDE: 0.9 INJECTION, SOLUTION INTRAVENOUS at 01:02

## 2020-02-22 RX ADMIN — PIPERACILLIN AND TAZOBACTAM 4.5 G: 4; .5 INJECTION, POWDER, FOR SOLUTION INTRAVENOUS at 03:02

## 2020-02-22 RX ADMIN — SODIUM CHLORIDE, SODIUM LACTATE, POTASSIUM CHLORIDE, AND CALCIUM CHLORIDE 1000 ML: .6; .31; .03; .02 INJECTION, SOLUTION INTRAVENOUS at 07:02

## 2020-02-22 RX ADMIN — NICOTINE 1 PATCH: 21 PATCH, EXTENDED RELEASE TRANSDERMAL at 01:02

## 2020-02-22 RX ADMIN — ENOXAPARIN SODIUM 40 MG: 100 INJECTION SUBCUTANEOUS at 05:02

## 2020-02-22 RX ADMIN — PANTOPRAZOLE SODIUM 40 MG: 40 TABLET, DELAYED RELEASE ORAL at 01:02

## 2020-02-22 RX ADMIN — SODIUM CHLORIDE 1000 ML: 9 INJECTION, SOLUTION INTRAVENOUS at 05:02

## 2020-02-22 RX ADMIN — ACETAMINOPHEN 1000 MG: 500 TABLET ORAL at 08:02

## 2020-02-22 RX ADMIN — DEXTROSE AND SODIUM CHLORIDE: 5; .45 INJECTION, SOLUTION INTRAVENOUS at 08:02

## 2020-02-22 RX ADMIN — POTASSIUM CHLORIDE 40 MEQ: 1500 TABLET, EXTENDED RELEASE ORAL at 09:02

## 2020-02-22 RX ADMIN — PIPERACILLIN AND TAZOBACTAM 4.5 G: 4; .5 INJECTION, POWDER, LYOPHILIZED, FOR SOLUTION INTRAVENOUS; PARENTERAL at 07:02

## 2020-02-22 RX ADMIN — IPRATROPIUM BROMIDE AND ALBUTEROL SULFATE 3 ML: .5; 3 SOLUTION RESPIRATORY (INHALATION) at 05:02

## 2020-02-22 RX ADMIN — ACETAMINOPHEN 650 MG: 325 TABLET ORAL at 07:02

## 2020-02-22 NOTE — ED PROVIDER NOTES
Encounter Date: 2/22/2020       History     Chief Complaint   Patient presents with    Abdominal Pain     pt complains of abd pain w/ nausea and vomiting x days, weeks.     HPI     54-year-old male past medical history diabetes, hepatitis, hypertension history of polysubstance abuse, pancreatitis presents with abdominal pain for the last 2 days with nausea and vomiting home, and worsening body aches over the last day.  Patient reports he has not had his insulin in some time and reports his glucose is very high.  He states using IV heroin last use few days ago reportedly.  He reports coughing significant amount denies any phlegm production, denies any blood in his sputum, or any chest pain. Denies any blood in her stool, reports normal last bowel movement, reports he is urinating as normal and reports he is extremely thirsty and hungry.  Denies any fevers/chills, no extremity weakness/numbness/tingling.  Denies any further complaints.    Review of patient's allergies indicates:  No Known Allergies  Past Medical History:   Diagnosis Date    Diabetes mellitus     Hepatitis     HTN (hypertension)     Pancreatitis      Past Surgical History:   Procedure Laterality Date    CARDIAC SURGERY  1999    stent placed. ([facility])    SHOULDER SURGERY  2013    right shoulder, spur removal.     Family History   Problem Relation Age of Onset    Asthma Mother      Social History     Tobacco Use    Smoking status: Current Every Day Smoker     Packs/day: 1.00     Years: 20.00     Pack years: 20.00     Types: Cigarettes     Start date: 4/3/1981    Smokeless tobacco: Never Used   Substance Use Topics    Alcohol use: No     Alcohol/week: 0.0 standard drinks    Drug use: Yes     Types: IV, Marijuana, Heroin     Comment: currently, used today, herion     Review of Systems   Constitutional: Positive for activity change, appetite change, chills, fatigue and fever.   HENT: Negative.    Eyes: Negative.    Respiratory:  Negative.    Cardiovascular: Negative.    Gastrointestinal: Positive for abdominal pain, nausea and vomiting.   Genitourinary: Negative.    Musculoskeletal: Negative.    Skin: Negative.    Neurological: Positive for dizziness and light-headedness.       Physical Exam     Initial Vitals [02/22/20 0416]   BP Pulse Resp Temp SpO2   (!) 140/80 (!) 130 (!) 40 98.4 °F (36.9 °C) 100 %      MAP       --         Physical Exam    Nursing note and vitals reviewed.  Constitutional: He is not diaphoretic. He appears toxic. He appears ill. He appears distressed.   HENT:   Head: Normocephalic and atraumatic.   Nose: Nose normal.   Mouth/Throat: No oropharyngeal exudate.   Eyes: EOM are normal. Pupils are equal, round, and reactive to light.   Neck: Normal range of motion. Neck supple. No tracheal deviation present. No JVD present.   Cardiovascular: Normal rate, regular rhythm and normal heart sounds.   No murmur heard.  Pulmonary/Chest: Breath sounds normal. No respiratory distress. He has no wheezes. He has no rhonchi. He has no rales.   Abdominal: Soft. Bowel sounds are normal. He exhibits no distension. There is tenderness in the epigastric area. There is no rebound and no guarding.   Musculoskeletal: Normal range of motion. He exhibits no edema or tenderness.   Neurological: He is alert and oriented to person, place, and time. He has normal strength. No cranial nerve deficit.   Skin: Skin is warm and dry. Capillary refill takes less than 2 seconds. No rash noted. No erythema.         ED Course   Critical Care  Date/Time: 2/22/2020 7:34 AM  Performed by: Chiki Wan Jr., MD  Authorized by: Chiki Wan Jr., MD   Direct patient critical care time: 10 minutes  Additional history critical care time: 10 minutes  Ordering / reviewing critical care time: 5 minutes  Documentation critical care time: 5 minutes  Consulting other physicians critical care time: 5 minutes  Total critical care time (exclusive of procedural  time) : 35 minutes  Critical care was necessary to treat or prevent imminent or life-threatening deterioration of the following conditions: endocrine crisis and metabolic crisis.  Critical care was time spent personally by me on the following activities: development of treatment plan with patient or surrogate, discussions with consultants, interpretation of cardiac output measurements, evaluation of patient's response to treatment, obtaining history from patient or surrogate, examination of patient, ordering and performing treatments and interventions, review of old charts, re-evaluation of patient's condition, pulse oximetry, ordering and review of laboratory studies and ordering and review of radiographic studies.        Labs Reviewed   CBC W/ AUTO DIFFERENTIAL - Abnormal; Notable for the following components:       Result Value    Hemoglobin 12.0 (*)     Hematocrit 39.6 (*)     Mean Corpuscular Hemoglobin 26.0 (*)     Mean Corpuscular Hemoglobin Conc 30.3 (*)     Platelets 481 (*)     All other components within normal limits   COMPREHENSIVE METABOLIC PANEL - Abnormal; Notable for the following components:    Sodium 130 (*)     CO2 7 (*)     Glucose 614 (*)     BUN, Bld 21 (*)     Creatinine 1.9 (*)     Total Protein 8.8 (*)     Albumin 2.6 (*)     AST 8 (*)     ALT 8 (*)     Anion Gap 28 (*)     eGFR if  46 (*)     eGFR if non  39 (*)     All other components within normal limits    Narrative:     CO2 and Glucose critical result(s) called and verbal readback   obtained from Avery Martínez by MUNIR 02/22/2020 06:35   URINALYSIS, REFLEX TO URINE CULTURE - Abnormal; Notable for the following components:    Protein, UA 2+ (*)     Glucose, UA 3+ (*)     Ketones, UA 2+ (*)     Occult Blood UA 2+ (*)     All other components within normal limits    Narrative:     Preferred Collection Type->Urine, Clean Catch   URINALYSIS MICROSCOPIC - Abnormal; Notable for the following components:     Bacteria Few (*)     All other components within normal limits    Narrative:     Preferred Collection Type->Urine, Clean Catch   ISTAT PROCEDURE - Abnormal; Notable for the following components:    POC PH 7.138 (*)     POC PCO2 18.8 (*)     POC HCO3 6.4 (*)     POC SATURATED O2 74 (*)     POC TCO2 7 (*)     All other components within normal limits   CULTURE, BLOOD   CULTURE, BLOOD   LIPASE   LACTIC ACID, PLASMA   TROPONIN I   ALCOHOL,MEDICAL (ETHANOL)   DRUG SCREEN PANEL, URINE EMERGENCY   PHOSPHORUS   MAGNESIUM   HEMOGLOBIN A1C   BASIC METABOLIC PANEL   PHOSPHORUS   POCT GLUCOSE MONITORING CONTINUOUS     EKG Readings: (Independently Interpreted)   Initial Reading: No STEMI. Ectopy: No Ectopy.   EKG reviewed and interpreted by me shows sinus tachycardia rate of 126.  MT, QRS, QT intervals within normal limits.  There is normal axis.  There is normal R-wave progression.  There are no ST segment or T-wave ischemic findings.         Imaging Results           X-Ray Chest AP Portable (Final result)  Result time 02/22/20 05:46:53    Final result by Khris Anna MD (02/22/20 05:46:53)                 Impression:      Confluent infiltrates/airspace disease at the right lung base with additional patchy nodular opacities of the mid to inferior right hemithorax, follow-up to document resolution is recommended.    Mild left basilar infiltrate or atelectasis.    This report was flagged in Epic as abnormal.      Electronically signed by: Khris Anna  Date:    02/22/2020  Time:    05:46             Narrative:    EXAMINATION:  XR CHEST AP PORTABLE    CLINICAL HISTORY:  Unspecified abdominal pain    TECHNIQUE:  Single frontal view of the chest was performed.    COMPARISON:  April 19, 2019    FINDINGS:  Single portable chest view is submitted.  The cardiomediastinal silhouette appears stable.  There is appearance of may relate to mild infiltrate or atelectasis at the left base however there is more prominent confluent  appearing infiltrates/airspace disease at the right lung base, with mild patchy nodular opacities throughout the mid to inferior right hemithorax.  Follow-up to document resolution is recommended.  There is no significant pleural effusion and no pneumothorax.  The osseous structures appear intact                              X-Rays:   Independently Interpreted Readings:   Other Readings:  Chest x-ray reveals findings consistent with possible developing infiltrates.                   MDM:    54-year-old male past medical history diabetes, hepatitis, hypertension history of polysubstance abuse, pancreatitis presents with abdominal pain for the last 2 days with nausea and vomiting home, and worsening body aches over the last day.  Physical exam remarkable for ill appearing male, conversing with ease, abdomen ttp epigastrically, no rebound/guarding noted, no mass appreciated, CTAB, no peripheral edema noted.  ED workup remarkable for vbg - pH - 7.138, HCO3 - 6.4, Cr - 1.9, glucose 614, AG - 28.  Pt presentation consistent with DKA, with chest x-ray showing patchy infiltrate.  With history of IV drug use, will obtain blood cultures, started on insulin drip, give antibiotics.  Patient given 2 L IV fluid bolus initially.  Discussed patient with Dr. Rivera, who will continue to followup patient and disposition accordingly to ICU.    7:36 AM this is Dr. Rivera:  I took over care of this patient at shift change at 6 AM.  This patient appears to be in DKA as above.  After potassium noted to be 5.1, insulin bolus and drip started.  Patient is on DKA protocol.  He is receiving IV fluids as well. He does complain of coughing and has findings of possible infiltrates on chest x-ray.  He is not hypoxic or requiring supplemental oxygen.  Blood cultures ordered inpatient.  Blood cultures sent and patient started on antibiotics.  He is not altered at this time.  He is tachycardic but is normotensive.  He has a history of pancreatitis  but today lipase is normal.  He does not have any evidence of acute abdomen.  Patient needs to be admitted to the intensive care unit.  Case discussed with the admitting ICU hospitalist, Dr. Valdovinos.  Who agrees with the above plan.                      Clinical Impression:       ICD-10-CM ICD-9-CM   1. Diabetic ketoacidosis without coma associated with diabetes mellitus due to underlying condition E08.10 249.11   2. Abdominal pain R10.9 789.00   3. RAMYA (acute kidney injury) N17.9 584.9   4. Pneumonia due to infectious organism, unspecified laterality, unspecified part of lung J18.9 136.9     484.8                             Chiki Wan Jr., MD  02/22/20 0754

## 2020-02-22 NOTE — ASSESSMENT & PLAN NOTE
he is in DKA with blood sugar of 635,AG of 26 and CO 2 of 6,he has benen started on DKA protocol with aggressive IVF,insulin gtt,BMP Q 4 hours,.

## 2020-02-22 NOTE — H&P
[facility] Hospital Medicine  History & Physical    Patient Name: Cali Mabry  MRN: [id number]  Admission Date: 2/22/2020  Attending Physician: Kalyani Alberto MD   Primary Care Provider: Primary Doctor No         Patient information was obtained from patient and ER records.     Subjective:     Principal Problem:Diabetic ketoacidosis without coma associated with diabetes mellitus due to underlying condition    Chief Complaint:   Chief Complaint   Patient presents with    Abdominal Pain     pt complains of abd pain w/ nausea and vomiting x days, weeks.        HPI: 54-year-old male past medical history diabetes, hepatitis, hypertension history of polysubstance abuse, pancreatitis presents with abdominal pain for the last 2 days with nausea and vomiting home, and worsening body aches over the last day.  Patient reports he has not had his insulin in some time and reports his glucose is very high.  He states using IV heroin last use few days ago reportedly.  He reports coughing significant amount denies any phlegm production, denies any blood in his sputum, or any chest pain. Denies any blood in her stool, reports normal last bowel movement, reports he is urinating as normal and reports he is extremely thirsty and hungry.  Denies any fevers/chills, no extremity weakness/numbness/tingling.he is in DKA with blood sugar of 635,AG of 26 and CO 2 of 6,he has benen started on DKA protocol,RAMYA with CRT of 1.7,chest  Ray show also sign of pneumonia,patient has been started on broad spectrum IV Abx afterr bload culture is done,    Past Medical History:   Diagnosis Date    Diabetes mellitus     Hepatitis     HTN (hypertension)     Pancreatitis        Past Surgical History:   Procedure Laterality Date    CARDIAC SURGERY  1999    stent placed. ([facility])    SHOULDER SURGERY  2013    right shoulder, spur removal.       Review of patient's allergies indicates:  No Known Allergies    No  current facility-administered medications on file prior to encounter.      Current Outpatient Medications on File Prior to Encounter   Medication Sig    atorvastatin (LIPITOR) 40 MG tablet Take 40 mg by mouth once daily.    gabapentin (NEURONTIN) 300 MG capsule Take 300 mg by mouth.    ibuprofen (ADVIL,MOTRIN) 600 MG tablet Take 1 tablet (600 mg total) by mouth every 6 (six) hours as needed for Pain.    insulin aspart U-100 (NOVOLOG) 100 unit/mL (3 mL) InPn pen Inject 10 Units into the skin.    LANTUS 100 unit/mL injection Inject 15 Units into the skin 2 (two) times daily. (Patient taking differently: Inject 30 Units into the skin once daily. )    naloxone (NARCAN) 1 mg/mL injection 2 mg (1 mg per nostril) by Nasal route as needed for opioid overdose; may repeat in 3 to 5 minutes if not effective. Call 911    naloxone (NARCAN) 4 mg/actuation Spry 4mg by nasal route as needed for opioid overdose; may repeat every 2-3 minutes in alternating nostrils until medical help arrives. Call 911    nicotine (NICODERM CQ) 21 mg/24 hr Place 1 patch onto the skin once daily.    omeprazole (PRILOSEC) 40 MG capsule Take 1 capsule (40 mg total) by mouth once daily.     Family History     Problem Relation (Age of Onset)    Asthma Mother        Tobacco Use    Smoking status: Current Every Day Smoker     Packs/day: 1.00     Years: 20.00     Pack years: 20.00     Types: Cigarettes     Start date: 4/3/1981    Smokeless tobacco: Never Used   Substance and Sexual Activity    Alcohol use: No     Alcohol/week: 0.0 standard drinks    Drug use: Yes     Types: IV, Marijuana, Heroin     Comment: currently, used today, herion    Sexual activity: Yes     Partners: Female     Review of Systems   Constitutional: Positive for activity change and appetite change.   HENT: Negative for congestion and dental problem.    Eyes: Negative for discharge and itching.   Respiratory: Negative for apnea and chest tightness.    Cardiovascular:  Negative for chest pain and leg swelling.   Gastrointestinal: Positive for nausea and vomiting. Negative for abdominal pain.   Endocrine: Negative for cold intolerance.   Genitourinary: Negative for difficulty urinating and dysuria.   Musculoskeletal: Negative for arthralgias and back pain.   Skin: Negative for color change and pallor.   Allergic/Immunologic: Negative for environmental allergies and food allergies.   Neurological: Negative for dizziness and facial asymmetry.   Hematological: Does not bruise/bleed easily.   Psychiatric/Behavioral: Negative for agitation.     Objective:     Vital Signs (Most Recent):  Temp: 98.6 °F (37 °C) (02/22/20 1101)  Pulse: (!) 111 (02/22/20 1101)  Resp: (!) 26 (02/22/20 1101)  BP: 113/66 (02/22/20 1101)  SpO2: 96 % (02/22/20 1101) Vital Signs (24h Range):  Temp:  [98.4 °F (36.9 °C)-99.6 °F (37.6 °C)] 98.6 °F (37 °C)  Pulse:  [111-130] 111  Resp:  [26-40] 26  SpO2:  [96 %-100 %] 96 %  BP: (113-140)/(58-80) 113/66     Weight: 68 kg (150 lb)  Body mass index is 22.81 kg/m².    Physical Exam   Constitutional: He is oriented to person, place, and time. No distress.   HENT:   Head: Atraumatic.   Eyes: Pupils are equal, round, and reactive to light. EOM are normal.   Neck: Normal range of motion. Neck supple.   Cardiovascular: Normal rate and regular rhythm.   Pulmonary/Chest: Effort normal.   Abdominal: Soft. He exhibits no distension.   Musculoskeletal: Normal range of motion. He exhibits deformity. He exhibits no edema.   Neurological: He is oriented to person, place, and time. No cranial nerve deficit. Coordination normal.   Skin: Skin is warm and dry.   Psychiatric: He has a normal mood and affect. His behavior is normal.         CRANIAL NERVES     CN III, IV, VI   Pupils are equal, round, and reactive to light.  Extraocular motions are normal.        Significant Labs:   CBC:   Recent Labs   Lab 02/22/20  0540   WBC 12.39   HGB 12.0*   HCT 39.6*   *     CMP:   Recent Labs    Lab 02/22/20  0540 02/22/20  0752   * 132*   K 5.1 5.1   CL 95 100   CO2 7* 6*   * 635*   BUN 21* 22*   CREATININE 1.9* 1.7*   CALCIUM 9.4 8.6*   PROT 8.8*  --    ALBUMIN 2.6*  --    BILITOT 0.6  --    ALKPHOS 129  --    AST 8*  --    ALT 8*  --    ANIONGAP 28* 26*   EGFRNONAA 39* 45*       Significant Imaging: reviewed.    Assessment/Plan:     * Diabetic ketoacidosis without coma associated with diabetes mellitus due to underlying condition    he is in DKA with blood sugar of 635,AG of 26 and CO 2 of 6,he has benen started on DKA protocol with aggressive IVF,insulin gtt,BMP Q 4 hours,.    RAMYA (acute kidney injury)  Duo to DKA,dehyration,on IVF,will monitor.      Pneumonia due to infectious organism  Per clinical presentation and imaging,will continue wit IV Abx.      Chronic hepatitis C  Will monitor.      Intravenous drug abuse  Consulted brisa avoid illicit,blood culture is done,on vanc.      Hyperlipidemia  On stain.      Pancreatitis, chronic  Stable ,at this  time.      Essential hypertension  Stable on home BP med;s/      Type 2 diabetes mellitus, uncontrolled  Non complint with his insulin,he was consulted.        VTE Risk Mitigation (From admission, onward)         Ordered     enoxaparin injection 40 mg  Daily      02/22/20 1157     IP VTE HIGH RISK PATIENT  Once      02/22/20 1157     Place DAWIT hose  Until discontinued      02/22/20 1157              Critical care time spent on the evaluation and treatment of severe organ dysfunction, review of pertinent labs and imaging studies, discussions with consulting providers and discussions with patient/family: over 45  minutes.     Kalyani Alberto MD  Department of Hospital Medicine   [facility]

## 2020-02-22 NOTE — CONSULTS
"  [facility]  Adult Nutrition  Consult Note    SUMMARY     Recommendations    1. When medically able ADAT to 2000 kcal Diabetic diet.   2. Encourage PO intake of meals.     Goals: 1. >75% of EEN, EPN by RD follow up.   Nutrition Goal Status: new    Reason for Assessment    Reason For Assessment: consult  Diagnosis: (DKA)  Relevant Medical History: DM, HTN  Interdisciplinary Rounds: did not attend  General Information Comments:  2.22.20- Pt is a 54-year-old male who presented with abdominal pain for the last 2 days with nausea and vomiting. -150 lbs, No change in weight. NFPE unable to be completed due to remote assessment. RD to complete on Follow Up.   Nutrition Discharge Planning: Adequate nutrition to meet needs via diabetic diet.     Nutrition Risk Screen     Nutrition Risk Screen: no indicators present     Nutrition/Diet History     Spiritual, Cultural Beliefs, Confucianism Practices, Values that Affect Care: no    Anthropometrics    Height Method: Measured  Height: 5' 8" (172.7 cm)  Height (inches): 68 in  Weight Method: Stated  Weight: 68 kg (149 lb 14.6 oz)  Weight (lb): 149.91 lb  Ideal Body Weight (IBW), Male: 154 lb  % Ideal Body Weight, Male (lb): 97.34 %  BMI (Calculated): 22.8  Usual Body Weight (UBW), k kg  % Usual Body Weight: 103.25  % Weight Change From Usual Weight: 3.03 %     Lab/Procedures/Meds    Pertinent Labs Reviewed: reviewed  Pertinent Labs Comments: Na 132, BUN 22, Cr 1.7, Phos 4.6  Pertinent Medications Reviewed: reviewed  Pertinent Medications Comments: Atorvastatin, Pantoprazole, Senna docusate, Vanc    Estimated/Assessed Needs    Weight Used For Calorie Calculations: 68 kg (149 lb 14.6 oz)  Energy Calorie Requirements (kcal): 1798 kcals/day(x 1.2)  Energy Need Method: [place]  Protein Requirements: 54.51-68.14 g/day(0.8-1.0 g/kg)  Weight Used For Protein Calculations: 68 kg (149 lb 14.6 oz)  Fluid Requirements (mL): 1mL/kcal or per MD  Estimated " Fluid Requirement Method: RDA Method  RDA Method (mL): 1798  CHO Requirement: 245 g CHO    Nutrition Prescription Ordered    Current Diet Order: NPO    Evaluation of Received Nutrient/Fluid Intake    Energy Calories Required: not meeting needs  Protein Required: not meeting needs  Fluid Required: meeting needs  % Intake of Estimated Energy Needs: 0 %  % Meal Intake: NPO    Nutrition Risk    Level of Risk/Frequency of Follow-up: moderate     Assessment and Plan    Nutrition Problem  Inadequate energy intake    Related to (etiology):   Inability to consume sufficient energy      Signs and Symptoms (as evidenced by):   NPO status    Interventions (treatment strategy):  Collaboration with other providers    Nutrition Diagnosis Status:   New    Monitor and Evaluation    Food and Nutrient Intake: food and beverage intake  Food and Nutrient Adminstration: diet order  Knowledge/Beliefs/Attitudes: food and nutrition knowledge/skill  Physical Activity and Function: nutrition-related ADLs and IADLs  Anthropometric Measurements: weight  Biochemical Data, Medical Tests and Procedures: glucose/endocrine profile  Nutrition-Focused Physical Findings: overall appearance     Malnutrition Assessment     NFPE unable to be completed due to remote assessment. RD to complete on Follow Up.     Nutrition Follow-Up    RD Follow-up?: Yes

## 2020-02-22 NOTE — PLAN OF CARE
Problem: Oral Intake Inadequate  Goal: Improved Oral Intake  Outcome: Ongoing, Progressing     Recommendations    1. When medically able ADAT to 2000 kcal Diabetic diet.   2. Encourage PO intake of meals.     Goals: 1. >75% of EEN, EPN by RD follow up.   Nutrition Goal Status: new

## 2020-02-22 NOTE — HPI
54-year-old male past medical history diabetes, hepatitis, hypertension history of polysubstance abuse, pancreatitis presents with abdominal pain for the last 2 days with nausea and vomiting home, and worsening body aches over the last day.  Patient reports he has not had his insulin in some time and reports his glucose is very high.  He states using IV heroin last use few days ago reportedly.  He reports coughing significant amount denies any phlegm production, denies any blood in his sputum, or any chest pain. Denies any blood in her stool, reports normal last bowel movement, reports he is urinating as normal and reports he is extremely thirsty and hungry.  Denies any fevers/chills, no extremity weakness/numbness/tingling.he is in DKA with blood sugar of 635,AG of 26 and CO 2 of 6,he has benen started on DKA protocol,RAMYA with CRT of 1.7,chest  Ray show also sign of pneumonia,patient has been started on broad spectrum IV Abx afterr bload culture is done,

## 2020-02-22 NOTE — ED NOTES
"Report received from RITA Ceballos. Pt lying in bed. Restless. States "I'm thirsty". Requesting water. Pt aaox4. Respirations even and unlabored.   "

## 2020-02-22 NOTE — PROGRESS NOTES
Pharmacist Renal Dose Adjustment Note    Cali Mabry is a 54 y.o. male being treated with the medication Zosyn    Patient Data:    Vital Signs (Most Recent):  Temp: 98.4 °F (36.9 °C) (02/22/20 0416)  Pulse: (!) 129 (02/22/20 0525)  Resp: (!) 32 (02/22/20 0525)  BP: (!) 140/80 (02/22/20 0416)  SpO2: 100 % (02/22/20 0525)   Vital Signs (72h Range):  Temp:  [98.4 °F (36.9 °C)]   Pulse:  [129-130]   Resp:  [32-40]   BP: (140)/(80)   SpO2:  [100 %]      No results for input(s): CREATININE in the last 168 hours.  Creatinine clearance cannot be calculated (Patient's most recent lab result is older than the maximum 7 days allowed.)    Medication:Zosyn 4.5gm q6h ordered.  As per Renal dose Adjustment per Pharmacy Zosyn will be changed to 4.5gm q8h.    Pharmacist's Name: Jimmy Rosen  Pharmacist's Extension: [phone]

## 2020-02-22 NOTE — SUBJECTIVE & OBJECTIVE
Past Medical History:   Diagnosis Date    Diabetes mellitus     Hepatitis     HTN (hypertension)     Pancreatitis        Past Surgical History:   Procedure Laterality Date    CARDIAC SURGERY  1999    stent placed. ([facility])    SHOULDER SURGERY  2013    right shoulder, spur removal.       Review of patient's allergies indicates:  No Known Allergies    No current facility-administered medications on file prior to encounter.      Current Outpatient Medications on File Prior to Encounter   Medication Sig    atorvastatin (LIPITOR) 40 MG tablet Take 40 mg by mouth once daily.    gabapentin (NEURONTIN) 300 MG capsule Take 300 mg by mouth.    ibuprofen (ADVIL,MOTRIN) 600 MG tablet Take 1 tablet (600 mg total) by mouth every 6 (six) hours as needed for Pain.    insulin aspart U-100 (NOVOLOG) 100 unit/mL (3 mL) InPn pen Inject 10 Units into the skin.    LANTUS 100 unit/mL injection Inject 15 Units into the skin 2 (two) times daily. (Patient taking differently: Inject 30 Units into the skin once daily. )    naloxone (NARCAN) 1 mg/mL injection 2 mg (1 mg per nostril) by Nasal route as needed for opioid overdose; may repeat in 3 to 5 minutes if not effective. Call 911    naloxone (NARCAN) 4 mg/actuation Spry 4mg by nasal route as needed for opioid overdose; may repeat every 2-3 minutes in alternating nostrils until medical help arrives. Call 911    nicotine (NICODERM CQ) 21 mg/24 hr Place 1 patch onto the skin once daily.    omeprazole (PRILOSEC) 40 MG capsule Take 1 capsule (40 mg total) by mouth once daily.     Family History     Problem Relation (Age of Onset)    Asthma Mother        Tobacco Use    Smoking status: Current Every Day Smoker     Packs/day: 1.00     Years: 20.00     Pack years: 20.00     Types: Cigarettes     Start date: 4/3/1981    Smokeless tobacco: Never Used   Substance and Sexual Activity    Alcohol use: No     Alcohol/week: 0.0 standard drinks    Drug use: Yes     Types: IV,  Marijuana, Heroin     Comment: currently, used today, herion    Sexual activity: Yes     Partners: Female     Review of Systems   Constitutional: Positive for activity change and appetite change.   HENT: Negative for congestion and dental problem.    Eyes: Negative for discharge and itching.   Respiratory: Negative for apnea and chest tightness.    Cardiovascular: Negative for chest pain and leg swelling.   Gastrointestinal: Positive for nausea and vomiting. Negative for abdominal pain.   Endocrine: Negative for cold intolerance.   Genitourinary: Negative for difficulty urinating and dysuria.   Musculoskeletal: Negative for arthralgias and back pain.   Skin: Negative for color change and pallor.   Allergic/Immunologic: Negative for environmental allergies and food allergies.   Neurological: Negative for dizziness and facial asymmetry.   Hematological: Does not bruise/bleed easily.   Psychiatric/Behavioral: Negative for agitation.     Objective:     Vital Signs (Most Recent):  Temp: 98.6 °F (37 °C) (02/22/20 1101)  Pulse: (!) 111 (02/22/20 1101)  Resp: (!) 26 (02/22/20 1101)  BP: 113/66 (02/22/20 1101)  SpO2: 96 % (02/22/20 1101) Vital Signs (24h Range):  Temp:  [98.4 °F (36.9 °C)-99.6 °F (37.6 °C)] 98.6 °F (37 °C)  Pulse:  [111-130] 111  Resp:  [26-40] 26  SpO2:  [96 %-100 %] 96 %  BP: (113-140)/(58-80) 113/66     Weight: 68 kg (150 lb)  Body mass index is 22.81 kg/m².    Physical Exam   Constitutional: He is oriented to person, place, and time. No distress.   HENT:   Head: Atraumatic.   Eyes: Pupils are equal, round, and reactive to light. EOM are normal.   Neck: Normal range of motion. Neck supple.   Cardiovascular: Normal rate and regular rhythm.   Pulmonary/Chest: Effort normal.   Abdominal: Soft. He exhibits no distension.   Musculoskeletal: Normal range of motion. He exhibits deformity. He exhibits no edema.   Neurological: He is oriented to person, place, and time. No cranial nerve deficit. Coordination  normal.   Skin: Skin is warm and dry.   Psychiatric: He has a normal mood and affect. His behavior is normal.         CRANIAL NERVES     CN III, IV, VI   Pupils are equal, round, and reactive to light.  Extraocular motions are normal.        Significant Labs:   CBC:   Recent Labs   Lab 02/22/20  0540   WBC 12.39   HGB 12.0*   HCT 39.6*   *     CMP:   Recent Labs   Lab 02/22/20  0540 02/22/20  0752   * 132*   K 5.1 5.1   CL 95 100   CO2 7* 6*   * 635*   BUN 21* 22*   CREATININE 1.9* 1.7*   CALCIUM 9.4 8.6*   PROT 8.8*  --    ALBUMIN 2.6*  --    BILITOT 0.6  --    ALKPHOS 129  --    AST 8*  --    ALT 8*  --    ANIONGAP 28* 26*   EGFRNONAA 39* 45*       Significant Imaging: reviewed.

## 2020-02-23 PROBLEM — R78.81 BACTEREMIA DUE TO STAPHYLOCOCCUS AUREUS: Status: ACTIVE | Noted: 2020-02-23

## 2020-02-23 PROBLEM — B95.61 BACTEREMIA DUE TO STAPHYLOCOCCUS AUREUS: Status: ACTIVE | Noted: 2020-02-23

## 2020-02-23 LAB
ANION GAP SERPL CALC-SCNC: 17 MMOL/L (ref 8–16)
ANION GAP SERPL CALC-SCNC: 9 MMOL/L (ref 8–16)
ANISOCYTOSIS BLD QL SMEAR: SLIGHT
BASOPHILS # BLD AUTO: ABNORMAL K/UL (ref 0–0.2)
BASOPHILS NFR BLD: 0 % (ref 0–1.9)
BUN SERPL-MCNC: 18 MG/DL (ref 6–20)
BUN SERPL-MCNC: 19 MG/DL (ref 6–20)
BUN SERPL-MCNC: 20 MG/DL (ref 6–20)
BUN SERPL-MCNC: 21 MG/DL (ref 6–20)
CALCIUM SERPL-MCNC: 7.8 MG/DL (ref 8.7–10.5)
CALCIUM SERPL-MCNC: 8 MG/DL (ref 8.7–10.5)
CALCIUM SERPL-MCNC: 8.1 MG/DL (ref 8.7–10.5)
CALCIUM SERPL-MCNC: 8.2 MG/DL (ref 8.7–10.5)
CHLORIDE SERPL-SCNC: 103 MMOL/L (ref 95–110)
CHLORIDE SERPL-SCNC: 103 MMOL/L (ref 95–110)
CHLORIDE SERPL-SCNC: 104 MMOL/L (ref 95–110)
CHLORIDE SERPL-SCNC: 108 MMOL/L (ref 95–110)
CO2 SERPL-SCNC: 13 MMOL/L (ref 23–29)
CO2 SERPL-SCNC: 18 MMOL/L (ref 23–29)
CO2 SERPL-SCNC: 18 MMOL/L (ref 23–29)
CO2 SERPL-SCNC: 19 MMOL/L (ref 23–29)
CREAT SERPL-MCNC: 1.2 MG/DL (ref 0.5–1.4)
CREAT SERPL-MCNC: 1.2 MG/DL (ref 0.5–1.4)
CREAT SERPL-MCNC: 1.4 MG/DL (ref 0.5–1.4)
CREAT SERPL-MCNC: 1.4 MG/DL (ref 0.5–1.4)
DIFFERENTIAL METHOD: ABNORMAL
DOHLE BOD BLD QL SMEAR: PRESENT
EOSINOPHIL # BLD AUTO: ABNORMAL K/UL (ref 0–0.5)
EOSINOPHIL NFR BLD: 1 % (ref 0–8)
ERYTHROCYTE [DISTWIDTH] IN BLOOD BY AUTOMATED COUNT: 13.5 % (ref 11.5–14.5)
EST. GFR  (AFRICAN AMERICAN): >60 ML/MIN/1.73 M^2
EST. GFR  (NON AFRICAN AMERICAN): 57 ML/MIN/1.73 M^2
EST. GFR  (NON AFRICAN AMERICAN): 57 ML/MIN/1.73 M^2
EST. GFR  (NON AFRICAN AMERICAN): >60 ML/MIN/1.73 M^2
EST. GFR  (NON AFRICAN AMERICAN): >60 ML/MIN/1.73 M^2
GLUCOSE SERPL-MCNC: 145 MG/DL (ref 70–110)
GLUCOSE SERPL-MCNC: 157 MG/DL (ref 70–110)
GLUCOSE SERPL-MCNC: 263 MG/DL (ref 70–110)
GLUCOSE SERPL-MCNC: 277 MG/DL (ref 70–110)
HCT VFR BLD AUTO: 32.1 % (ref 40–54)
HGB BLD-MCNC: 10.4 G/DL (ref 14–18)
HYPOCHROMIA BLD QL SMEAR: ABNORMAL
IMM GRANULOCYTES # BLD AUTO: ABNORMAL K/UL (ref 0–0.04)
IMM GRANULOCYTES NFR BLD AUTO: ABNORMAL % (ref 0–0.5)
LYMPHOCYTES # BLD AUTO: ABNORMAL K/UL (ref 1–4.8)
LYMPHOCYTES NFR BLD: 5 % (ref 18–48)
MCH RBC QN AUTO: 25.9 PG (ref 27–31)
MCHC RBC AUTO-ENTMCNC: 32.4 G/DL (ref 32–36)
MCV RBC AUTO: 80 FL (ref 82–98)
METAMYELOCYTES NFR BLD MANUAL: 3 %
MONOCYTES # BLD AUTO: ABNORMAL K/UL (ref 0.3–1)
MONOCYTES NFR BLD: 11 % (ref 4–15)
MYELOCYTES NFR BLD MANUAL: 1 %
NEUTROPHILS # BLD AUTO: ABNORMAL K/UL (ref 1.8–7.7)
NEUTROPHILS NFR BLD: 60 % (ref 38–73)
NEUTS BAND NFR BLD MANUAL: 19 %
NRBC BLD-RTO: 0 /100 WBC
PHOSPHATE SERPL-MCNC: 2.2 MG/DL (ref 2.7–4.5)
PLATELET # BLD AUTO: 299 K/UL (ref 150–350)
PLATELET BLD QL SMEAR: ABNORMAL
PMV BLD AUTO: 9.6 FL (ref 9.2–12.9)
POCT GLUCOSE: 149 MG/DL (ref 70–110)
POCT GLUCOSE: 152 MG/DL (ref 70–110)
POCT GLUCOSE: 157 MG/DL (ref 70–110)
POCT GLUCOSE: 158 MG/DL (ref 70–110)
POCT GLUCOSE: 160 MG/DL (ref 70–110)
POCT GLUCOSE: 167 MG/DL (ref 70–110)
POCT GLUCOSE: 170 MG/DL (ref 70–110)
POCT GLUCOSE: 223 MG/DL (ref 70–110)
POCT GLUCOSE: 233 MG/DL (ref 70–110)
POCT GLUCOSE: 248 MG/DL (ref 70–110)
POCT GLUCOSE: 262 MG/DL (ref 70–110)
POCT GLUCOSE: 280 MG/DL (ref 70–110)
POCT GLUCOSE: 292 MG/DL (ref 70–110)
POCT GLUCOSE: 96 MG/DL (ref 70–110)
POCT GLUCOSE: 97 MG/DL (ref 70–110)
POLYCHROMASIA BLD QL SMEAR: ABNORMAL
POTASSIUM SERPL-SCNC: 3.9 MMOL/L (ref 3.5–5.1)
POTASSIUM SERPL-SCNC: 4.1 MMOL/L (ref 3.5–5.1)
POTASSIUM SERPL-SCNC: 4.6 MMOL/L (ref 3.5–5.1)
POTASSIUM SERPL-SCNC: 5.8 MMOL/L (ref 3.5–5.1)
RBC # BLD AUTO: 4.02 M/UL (ref 4.6–6.2)
SODIUM SERPL-SCNC: 130 MMOL/L (ref 136–145)
SODIUM SERPL-SCNC: 130 MMOL/L (ref 136–145)
SODIUM SERPL-SCNC: 132 MMOL/L (ref 136–145)
SODIUM SERPL-SCNC: 138 MMOL/L (ref 136–145)
TOXIC GRANULES BLD QL SMEAR: PRESENT
WBC # BLD AUTO: 4.38 K/UL (ref 3.9–12.7)
WBC TOXIC VACUOLES BLD QL SMEAR: PRESENT

## 2020-02-23 PROCEDURE — C9399 UNCLASSIFIED DRUGS OR BIOLOG: HCPCS | Performed by: HOSPITALIST

## 2020-02-23 PROCEDURE — 63600175 PHARM REV CODE 636 W HCPCS: Performed by: HOSPITALIST

## 2020-02-23 PROCEDURE — 87186 SC STD MICRODIL/AGAR DIL: CPT

## 2020-02-23 PROCEDURE — 63600175 PHARM REV CODE 636 W HCPCS: Performed by: EMERGENCY MEDICINE

## 2020-02-23 PROCEDURE — 87070 CULTURE OTHR SPECIMN AEROBIC: CPT

## 2020-02-23 PROCEDURE — 63600175 PHARM REV CODE 636 W HCPCS: Performed by: INTERNAL MEDICINE

## 2020-02-23 PROCEDURE — 85027 COMPLETE CBC AUTOMATED: CPT

## 2020-02-23 PROCEDURE — 84100 ASSAY OF PHOSPHORUS: CPT

## 2020-02-23 PROCEDURE — 87205 SMEAR GRAM STAIN: CPT

## 2020-02-23 PROCEDURE — 87449 NOS EACH ORGANISM AG IA: CPT

## 2020-02-23 PROCEDURE — 11000001 HC ACUTE MED/SURG PRIVATE ROOM

## 2020-02-23 PROCEDURE — 25000003 PHARM REV CODE 250: Performed by: HOSPITALIST

## 2020-02-23 PROCEDURE — 25000003 PHARM REV CODE 250: Performed by: EMERGENCY MEDICINE

## 2020-02-23 PROCEDURE — 87040 BLOOD CULTURE FOR BACTERIA: CPT

## 2020-02-23 PROCEDURE — 87077 CULTURE AEROBIC IDENTIFY: CPT

## 2020-02-23 PROCEDURE — 36415 COLL VENOUS BLD VENIPUNCTURE: CPT

## 2020-02-23 PROCEDURE — 25000003 PHARM REV CODE 250: Performed by: INTERNAL MEDICINE

## 2020-02-23 PROCEDURE — 85007 BL SMEAR W/DIFF WBC COUNT: CPT

## 2020-02-23 PROCEDURE — 87899 AGENT NOS ASSAY W/OPTIC: CPT

## 2020-02-23 PROCEDURE — 80048 BASIC METABOLIC PNL TOTAL CA: CPT | Mod: 91

## 2020-02-23 RX ORDER — GUAIFENESIN/DEXTROMETHORPHAN 100-10MG/5
10 SYRUP ORAL EVERY 6 HOURS PRN
Status: DISCONTINUED | OUTPATIENT
Start: 2020-02-23 | End: 2020-02-28

## 2020-02-23 RX ORDER — GABAPENTIN 300 MG/1
300 CAPSULE ORAL 3 TIMES DAILY
Status: DISCONTINUED | OUTPATIENT
Start: 2020-02-23 | End: 2020-02-23

## 2020-02-23 RX ORDER — MAGNESIUM SULFATE HEPTAHYDRATE 40 MG/ML
2 INJECTION, SOLUTION INTRAVENOUS ONCE
Status: COMPLETED | OUTPATIENT
Start: 2020-02-23 | End: 2020-02-23

## 2020-02-23 RX ORDER — GLUCAGON 1 MG
1 KIT INJECTION
Status: DISCONTINUED | OUTPATIENT
Start: 2020-02-23 | End: 2020-03-08 | Stop reason: HOSPADM

## 2020-02-23 RX ORDER — GUAIFENESIN/DEXTROMETHORPHAN 100-10MG/5
10 SYRUP ORAL EVERY 6 HOURS
Status: DISCONTINUED | OUTPATIENT
Start: 2020-02-23 | End: 2020-02-23

## 2020-02-23 RX ORDER — IBUPROFEN 400 MG/1
400 TABLET ORAL EVERY 6 HOURS PRN
Status: DISCONTINUED | OUTPATIENT
Start: 2020-02-23 | End: 2020-03-08 | Stop reason: HOSPADM

## 2020-02-23 RX ORDER — IBUPROFEN 200 MG
24 TABLET ORAL
Status: DISCONTINUED | OUTPATIENT
Start: 2020-02-23 | End: 2020-03-08 | Stop reason: HOSPADM

## 2020-02-23 RX ORDER — GABAPENTIN 300 MG/1
300 CAPSULE ORAL 3 TIMES DAILY
Status: DISCONTINUED | OUTPATIENT
Start: 2020-02-23 | End: 2020-03-08 | Stop reason: HOSPADM

## 2020-02-23 RX ORDER — INSULIN ASPART 100 [IU]/ML
1-10 INJECTION, SOLUTION INTRAVENOUS; SUBCUTANEOUS
Status: DISCONTINUED | OUTPATIENT
Start: 2020-02-23 | End: 2020-03-08 | Stop reason: HOSPADM

## 2020-02-23 RX ORDER — INSULIN ASPART 100 [IU]/ML
10 INJECTION, SOLUTION INTRAVENOUS; SUBCUTANEOUS
Status: DISCONTINUED | OUTPATIENT
Start: 2020-02-23 | End: 2020-02-25

## 2020-02-23 RX ORDER — IBUPROFEN 200 MG
16 TABLET ORAL
Status: DISCONTINUED | OUTPATIENT
Start: 2020-02-23 | End: 2020-03-08 | Stop reason: HOSPADM

## 2020-02-23 RX ADMIN — VANCOMYCIN HYDROCHLORIDE 1000 MG: 1 INJECTION, POWDER, LYOPHILIZED, FOR SOLUTION INTRAVENOUS at 06:02

## 2020-02-23 RX ADMIN — POTASSIUM PHOSPHATE, MONOBASIC AND POTASSIUM PHOSPHATE, DIBASIC 30 MMOL: 224; 236 INJECTION, SOLUTION, CONCENTRATE INTRAVENOUS at 03:02

## 2020-02-23 RX ADMIN — SODIUM CHLORIDE 1.4 UNITS/HR: 9 INJECTION, SOLUTION INTRAVENOUS at 02:02

## 2020-02-23 RX ADMIN — INSULIN ASPART 10 UNITS: 100 INJECTION, SOLUTION INTRAVENOUS; SUBCUTANEOUS at 11:02

## 2020-02-23 RX ADMIN — INSULIN DETEMIR 30 UNITS: 100 INJECTION, SOLUTION SUBCUTANEOUS at 10:02

## 2020-02-23 RX ADMIN — AZITHROMYCIN MONOHYDRATE 500 MG: 500 INJECTION, POWDER, LYOPHILIZED, FOR SOLUTION INTRAVENOUS at 03:02

## 2020-02-23 RX ADMIN — GABAPENTIN 300 MG: 300 CAPSULE ORAL at 02:02

## 2020-02-23 RX ADMIN — INSULIN ASPART 4 UNITS: 100 INJECTION, SOLUTION INTRAVENOUS; SUBCUTANEOUS at 11:02

## 2020-02-23 RX ADMIN — GABAPENTIN 300 MG: 300 CAPSULE ORAL at 09:02

## 2020-02-23 RX ADMIN — MAGNESIUM SULFATE 2 G: 2 INJECTION INTRAVENOUS at 01:02

## 2020-02-23 RX ADMIN — INSULIN ASPART 10 UNITS: 100 INJECTION, SOLUTION INTRAVENOUS; SUBCUTANEOUS at 04:02

## 2020-02-23 RX ADMIN — GUAIFENESIN AND DEXTROMETHORPHAN 10 ML: 100; 10 SYRUP ORAL at 11:02

## 2020-02-23 RX ADMIN — ENOXAPARIN SODIUM 40 MG: 100 INJECTION SUBCUTANEOUS at 04:02

## 2020-02-23 RX ADMIN — ACETAMINOPHEN 650 MG: 325 TABLET ORAL at 07:02

## 2020-02-23 RX ADMIN — POTASSIUM PHOSPHATE, MONOBASIC 500 MG: 500 TABLET, SOLUBLE ORAL at 11:02

## 2020-02-23 RX ADMIN — PANTOPRAZOLE SODIUM 40 MG: 40 TABLET, DELAYED RELEASE ORAL at 08:02

## 2020-02-23 RX ADMIN — IBUPROFEN 400 MG: 400 TABLET, FILM COATED ORAL at 04:02

## 2020-02-23 RX ADMIN — PIPERACILLIN AND TAZOBACTAM 4.5 G: 4; .5 INJECTION, POWDER, FOR SOLUTION INTRAVENOUS at 07:02

## 2020-02-23 RX ADMIN — PIPERACILLIN AND TAZOBACTAM 4.5 G: 4; .5 INJECTION, POWDER, FOR SOLUTION INTRAVENOUS at 11:02

## 2020-02-23 RX ADMIN — ACETAMINOPHEN 650 MG: 325 TABLET ORAL at 01:02

## 2020-02-23 RX ADMIN — ATORVASTATIN CALCIUM 40 MG: 40 TABLET, FILM COATED ORAL at 08:02

## 2020-02-23 RX ADMIN — GUAIFENESIN AND DEXTROMETHORPHAN 10 ML: 100; 10 SYRUP ORAL at 10:02

## 2020-02-23 RX ADMIN — GUAIFENESIN AND DEXTROMETHORPHAN 10 ML: 100; 10 SYRUP ORAL at 04:02

## 2020-02-23 RX ADMIN — PIPERACILLIN AND TAZOBACTAM 4.5 G: 4; .5 INJECTION, POWDER, FOR SOLUTION INTRAVENOUS at 03:02

## 2020-02-23 RX ADMIN — IBUPROFEN 400 MG: 400 TABLET, FILM COATED ORAL at 11:02

## 2020-02-23 RX ADMIN — IBUPROFEN 400 MG: 400 TABLET, FILM COATED ORAL at 09:02

## 2020-02-23 RX ADMIN — ACETAMINOPHEN 650 MG: 325 TABLET ORAL at 09:02

## 2020-02-23 NOTE — SUBJECTIVE & OBJECTIVE
Past Medical History:   Diagnosis Date    Diabetes mellitus     Hepatitis     HTN (hypertension)     Pancreatitis        Past Surgical History:   Procedure Laterality Date    CARDIAC SURGERY  1999    stent placed. ([facility])    SHOULDER SURGERY  2013    right shoulder, spur removal.       Review of patient's allergies indicates:  No Known Allergies    No current facility-administered medications on file prior to encounter.      Current Outpatient Medications on File Prior to Encounter   Medication Sig    atorvastatin (LIPITOR) 40 MG tablet Take 40 mg by mouth once daily.    gabapentin (NEURONTIN) 300 MG capsule Take 300 mg by mouth.    ibuprofen (ADVIL,MOTRIN) 600 MG tablet Take 1 tablet (600 mg total) by mouth every 6 (six) hours as needed for Pain.    insulin aspart U-100 (NOVOLOG) 100 unit/mL (3 mL) InPn pen Inject 10 Units into the skin.    LANTUS 100 unit/mL injection Inject 15 Units into the skin 2 (two) times daily. (Patient taking differently: Inject 30 Units into the skin once daily. )    naloxone (NARCAN) 1 mg/mL injection 2 mg (1 mg per nostril) by Nasal route as needed for opioid overdose; may repeat in 3 to 5 minutes if not effective. Call 911    naloxone (NARCAN) 4 mg/actuation Spry 4mg by nasal route as needed for opioid overdose; may repeat every 2-3 minutes in alternating nostrils until medical help arrives. Call 911    nicotine (NICODERM CQ) 21 mg/24 hr Place 1 patch onto the skin once daily.    omeprazole (PRILOSEC) 40 MG capsule Take 1 capsule (40 mg total) by mouth once daily.     Family History     Problem Relation (Age of Onset)    Asthma Mother        Tobacco Use    Smoking status: Current Every Day Smoker     Packs/day: 1.00     Years: 20.00     Pack years: 20.00     Types: Cigarettes     Start date: 4/3/1981    Smokeless tobacco: Never Used   Substance and Sexual Activity    Alcohol use: No     Alcohol/week: 0.0 standard drinks    Drug use: Yes     Types: IV,  Marijuana, Heroin     Comment: currently, used today, herion    Sexual activity: Yes     Partners: Female     Review of Systems   Constitutional: Positive for activity change and appetite change.   HENT: Negative for congestion and dental problem.    Eyes: Negative for discharge and itching.   Respiratory: Positive for cough. Negative for apnea and chest tightness.    Cardiovascular: Negative for chest pain and leg swelling.   Gastrointestinal: Negative for abdominal pain, nausea and vomiting.   Endocrine: Negative for cold intolerance.   Genitourinary: Negative for difficulty urinating and dysuria.   Musculoskeletal: Negative for arthralgias and back pain.   Skin: Negative for color change and pallor.   Allergic/Immunologic: Negative for environmental allergies and food allergies.   Neurological: Negative for dizziness and facial asymmetry.   Hematological: Does not bruise/bleed easily.   Psychiatric/Behavioral: Negative for agitation.     Objective:     Vital Signs (Most Recent):  Temp: 98.7 °F (37.1 °C) (02/23/20 0744)  Pulse: 108 (02/23/20 1000)  Resp: (!) 34 (02/23/20 1000)  BP: (!) 105/55 (02/23/20 1000)  SpO2: 99 % (02/23/20 1000) Vital Signs (24h Range):  Temp:  [97.6 °F (36.4 °C)-104.8 °F (40.4 °C)] 98.7 °F (37.1 °C)  Pulse:  [101-137] 108  Resp:  [20-44] 34  SpO2:  [93 %-100 %] 99 %  BP: ()/(46-73) 105/55     Weight: 57.4 kg (126 lb 8.7 oz)  Body mass index is 19.24 kg/m².    Physical Exam   Constitutional: He is oriented to person, place, and time. No distress.   HENT:   Head: Atraumatic.   Eyes: Pupils are equal, round, and reactive to light. EOM are normal.   Neck: Normal range of motion. Neck supple.   Cardiovascular: Normal rate and regular rhythm.   Pulmonary/Chest: Effort normal.   Abdominal: Soft. He exhibits no distension.   Musculoskeletal: Normal range of motion. He exhibits no edema or deformity.   Neurological: He is oriented to person, place, and time. No cranial nerve deficit.  Coordination normal.   Skin: Skin is warm and dry.   Psychiatric: He has a normal mood and affect. His behavior is normal.         CRANIAL NERVES     CN III, IV, VI   Pupils are equal, round, and reactive to light.  Extraocular motions are normal.        Significant Labs:   CBC:   Recent Labs   Lab 02/22/20  0540 02/22/20  2105 02/23/20  0122   WBC 12.39 2.76* 4.38   HGB 12.0* 10.5* 10.4*   HCT 39.6* 32.7* 32.1*   * 283 299     CMP:   Recent Labs   Lab 02/22/20  0540  02/23/20  0122 02/23/20  0534 02/23/20  0919   *   < > 132* 130* 130*   K 5.1   < > 4.1 3.9 4.6   CL 95   < > 104 103 103   CO2 7*   < > 19* 18* 18*   *   < > 157* 263* 277*   BUN 21*   < > 20 19 18   CREATININE 1.9*   < > 1.4 1.2 1.2   CALCIUM 9.4   < > 8.1* 7.8* 8.0*   PROT 8.8*  --   --   --   --    ALBUMIN 2.6*  --   --   --   --    BILITOT 0.6  --   --   --   --    ALKPHOS 129  --   --   --   --    AST 8*  --   --   --   --    ALT 8*  --   --   --   --    ANIONGAP 28*   < > 9 9 9   EGFRNONAA 39*   < > 57* >60 >60    < > = values in this interval not displayed.       Significant Imaging: reviewed.

## 2020-02-23 NOTE — ASSESSMENT & PLAN NOTE
Has bacteremia,staph aureus,likely from IV heroin use,alreday on Vanc.repeated blood culture,Echo to R/O endocarditis.if Echo show endocarditis need ID consult

## 2020-02-23 NOTE — CARE UPDATE
[facility]  ICU Multidisciplinary Bedside Rounds   SUMMARY     Date: 2/23/2020    Prehospitalization: Home  Admit Date / LOS : 2/22/2020/ 1 days    Diagnosis: Diabetic ketoacidosis without coma associated with diabetes mellitus due to underlying condition    Consults:        Active: N/A       Needed: N/A     Code Status: Full Code   Advanced Directive: <no information>    LDA: PIV       Central Lines/Site/Justification:Patient Does Not Have Central Line       Urinary Cath/Order/Justification:Patient Does Not Have Urinary Catheter    Vasopressors/Infusions:    dextrose 5 % and 0.45 % NaCl 75 mL/hr at 02/23/20 0500    insulin (HUMAN R) infusion (adults) 1.6 Units/hr (02/23/20 0504)          GOALS: Volume/ Hemodynamic: N/A                     RASS: N/A    CAM ICU: N/A  Pain Management: PO       Pain Controlled: no     Rhythm: ST    Respiratory Device: Nasal Cannula                  Most Recent SBT/ SAT: N/A           VTE Prophylaxis: Pharm  Mobility: Bedrest  Stress Ulcer Prophylaxis: yes    Dietary: PO  Tolerance: no  /  Advancement: @ goal    Isolation: No active isolations    Restraints: No    Significant Dates:  Post Op Date: N/A  Rescue Date: N/A  Imaging/ Diagnostics: N/A    Noteworthy Labs:  , CO2 19        Results for AN CHOUDHURY (MRN [id number]) as of 2/23/2020 06:26   Ref. Range 2/23/2020 05:34   Sodium Latest Ref Range: 136 - 145 mmol/L 130 (L)   Potassium Latest Ref Range: 3.5 - 5.1 mmol/L 3.9   Chloride Latest Ref Range: 95 - 110 mmol/L 103   CO2 Latest Ref Range: 23 - 29 mmol/L 18 (L)   Anion Gap Latest Ref Range: 8 - 16 mmol/L 9   BUN, Bld Latest Ref Range: 6 - 20 mg/dL 19   Creatinine Latest Ref Range: 0.5 - 1.4 mg/dL 1.2   eGFR if non African American Latest Ref Range: >60 mL/min/1.73 m^2 >60   eGFR if  Latest Ref Range: >60 mL/min/1.73 m^2 >60   Glucose Latest Ref Range: 70 - 110 mg/dL 263 (H)   Calcium Latest Ref Range: 8.7 - 10.5 mg/dL 7.8 (L)        CBC/Anemia Labs: Coags:    Recent Labs   Lab 02/22/20 2105 02/23/20  0122   WBC 2.76* 4.38   HGB 10.5* 10.4*   HCT 32.7* 32.1*    299   MCV 79* 80*   RDW 13.5 13.5    No results for input(s): PT, INR, APTT in the last 168 hours.     Chemistries:   Recent Labs   Lab 02/22/20  0540  02/22/20 2105 02/23/20  0004 02/23/20  0122   *   < > 131*  131* 138 132*   K 5.1   < > 4.0  4.0 5.8* 4.1   CL 95   < > 103  103 108 104   CO2 7*   < > 18*  18* 13* 19*   BUN 21*   < > 21*  21* 21* 20   CREATININE 1.9*   < > 1.6*  1.6* 1.4 1.4   CALCIUM 9.4   < > 8.5*  8.5* 8.2* 8.1*   PROT 8.8*  --   --   --   --    BILITOT 0.6  --   --   --   --    ALKPHOS 129  --   --   --   --    ALT 8*  --   --   --   --    AST 8*  --   --   --   --    MG 1.9  --  1.6  --   --    PHOS 3.9   < > 1.4*  --  2.2*    < > = values in this interval not displayed.        Cardiac Enzymes: Ejection Fractions:    Recent Labs     02/22/20  0540   TROPONINI 0.007    Nuc Rest EF   Date Value Ref Range Status   03/15/2019 53  Final        POCT Glucose: HbA1c:    Recent Labs   Lab 02/23/20  0258 02/23/20  0402 02/23/20  0500   POCTGLUCOSE 167* 170* 248*    Hemoglobin A1C   Date Value Ref Range Status   02/22/2020 11.0 (H) 4.0 - 5.6 % Final     Comment:     ADA Screening Guidelines:  5.7-6.4%  Consistent with prediabetes  >or=6.5%  Consistent with diabetes  High levels of fetal hemoglobin interfere with the HbA1C  assay. Heterozygous hemoglobin variants (HbS, HgC, etc)do  not significantly interfere with this assay.   However, presence of multiple variants may affect accuracy.          Needs from Care Team: none     ICU LOS 17h  Level of Care: Critical Care

## 2020-02-23 NOTE — EICU
Rounding (Video Assessment):  No    Intervention Initiated From:  Bedside    Reyes Communicated with Bedside Nurse regarding:  Other    Nurse Notified:  Yes    Doctor Notified:  Yes    Comments: bedside nurse called to see if md could call her back . She would like to discuss POC and get order from him, if he has time. Informed Dr. Rose.

## 2020-02-23 NOTE — PROGRESS NOTES
[facility] Hospital Medicine  Progress Note    Patient Name: Cali Mabry  MRN: [id number]  Patient Class: IP- Inpatient   Admission Date: 2/22/2020  Length of Stay: 1 days  Attending Physician: Kalyani Alberto MD  Primary Care Provider: Primary Doctor No        Subjective:     Principal Problem:Diabetic ketoacidosis without coma associated with diabetes mellitus due to underlying condition        HPI:  54-year-old male past medical history diabetes, hepatitis, hypertension history of polysubstance abuse, pancreatitis presents with abdominal pain for the last 2 days with nausea and vomiting home, and worsening body aches over the last day.  Patient reports he has not had his insulin in some time and reports his glucose is very high.  He states using IV heroin last use few days ago reportedly.  He reports coughing significant amount denies any phlegm production, denies any blood in his sputum, or any chest pain. Denies any blood in her stool, reports normal last bowel movement, reports he is urinating as normal and reports he is extremely thirsty and hungry.  Denies any fevers/chills, no extremity weakness/numbness/tingling.he is in DKA with blood sugar of 635,AG of 26 and CO 2 of 6,he has benen started on DKA protocol,RAMYA with CRT of 1.7,chest  Ray show also sign of pneumonia,patient has been started on broad spectrum IV Abx afterr bload culture is done,    Overview/Hospital Course:  54-year-old male past medical history diabetes, hepatitis, hypertension history of polysubstance abuse,IV heroin,chronic  pancreatitis presents with abdominal pain for the last 2 days with nausea and vomiting home, and worsening body aches over the last day.  Patient reports he has not had his insulin in some time and reports his glucose is very high.  He states using IV heroin last use few days ago reportedly.  He reports coughing significant amount denies any phlegm production, denies any blood in his  sputum, or any chest pain. Denies any blood in her stool, reports normal last bowel movement, reports he is urinating as normal and reports he is extremely thirsty and hungry.  Denies any fevers/chills, no extremity weakness/numbness/tingling.he is in DKA with blood sugar of 635,AG of 26 and CO 2 of 6,he has been  started on DKA protocol,RAMYA with CRT of 1.7,chest  ray show also sign of pneumonia,patient has been started on broad spectrum IV Abx afterr bload culture is done,  AG is closed,started on SQ insulin,started on diet.  Has bacteremia,staph aureus,likely from IV heroin use,alreday on Vanc.repeated blood culture,Echo to R/O endocarditis.if Echo show endocarditis need ID consult.  On Zosyn and azithromycin for pneumonia.    Past Medical History:   Diagnosis Date    Diabetes mellitus     Hepatitis     HTN (hypertension)     Pancreatitis        Past Surgical History:   Procedure Laterality Date    [facility]  [facility]    SHOULDER SURGERY  2013    right shoulder, spur removal.       Review of patient's allergies indicates:  No Known Allergies    No current facility-administered medications on file prior to encounter.      Current Outpatient Medications on File Prior to Encounter   Medication Sig    atorvastatin (LIPITOR) 40 MG tablet Take 40 mg by mouth once daily.    gabapentin (NEURONTIN) 300 MG capsule Take 300 mg by mouth.    ibuprofen (ADVIL,MOTRIN) 600 MG tablet Take 1 tablet (600 mg total) by mouth every 6 (six) hours as needed for Pain.    insulin aspart U-100 (NOVOLOG) 100 unit/mL (3 mL) InPn pen Inject 10 Units into the skin.    LANTUS 100 unit/mL injection Inject 15 Units into the skin 2 (two) times daily. (Patient taking differently: Inject 30 Units into the skin once daily. )    naloxone (NARCAN) 1 mg/mL injection 2 mg (1 mg per nostril) by Nasal route as needed for opioid overdose; may repeat in 3 to 5 minutes if not effective. Call 911    naloxone  (NARCAN) 4 mg/actuation Spry 4mg by nasal route as needed for opioid overdose; may repeat every 2-3 minutes in alternating nostrils until medical help arrives. Call 911    nicotine (NICODERM CQ) 21 mg/24 hr Place 1 patch onto the skin once daily.    omeprazole (PRILOSEC) 40 MG capsule Take 1 capsule (40 mg total) by mouth once daily.     Family History     Problem Relation (Age of Onset)    Asthma Mother        Tobacco Use    Smoking status: Current Every Day Smoker     Packs/day: 1.00     Years: 20.00     Pack years: 20.00     Types: Cigarettes     Start date: 4/3/1981    Smokeless tobacco: Never Used   Substance and Sexual Activity    Alcohol use: No     Alcohol/week: 0.0 standard drinks    Drug use: Yes     Types: IV, Marijuana, Heroin     Comment: currently, used today, herion    Sexual activity: Yes     Partners: Female     Review of Systems   Constitutional: Positive for activity change and appetite change.   HENT: Negative for congestion and dental problem.    Eyes: Negative for discharge and itching.   Respiratory: Positive for cough. Negative for apnea and chest tightness.    Cardiovascular: Negative for chest pain and leg swelling.   Gastrointestinal: Negative for abdominal pain, nausea and vomiting.   Endocrine: Negative for cold intolerance.   Genitourinary: Negative for difficulty urinating and dysuria.   Musculoskeletal: Negative for arthralgias and back pain.   Skin: Negative for color change and pallor.   Allergic/Immunologic: Negative for environmental allergies and food allergies.   Neurological: Negative for dizziness and facial asymmetry.   Hematological: Does not bruise/bleed easily.   Psychiatric/Behavioral: Negative for agitation.     Objective:     Vital Signs (Most Recent):  Temp: 98.7 °F (37.1 °C) (02/23/20 0744)  Pulse: 108 (02/23/20 1000)  Resp: (!) 34 (02/23/20 1000)  BP: (!) 105/55 (02/23/20 1000)  SpO2: 99 % (02/23/20 1000) Vital Signs (24h Range):  Temp:  [97.6 °F (36.4  °C)-104.8 °F (40.4 °C)] 98.7 °F (37.1 °C)  Pulse:  [101-137] 108  Resp:  [20-44] 34  SpO2:  [93 %-100 %] 99 %  BP: ()/(46-73) 105/55     Weight: 57.4 kg (126 lb 8.7 oz)  Body mass index is 19.24 kg/m².    Physical Exam   Constitutional: He is oriented to person, place, and time. No distress.   HENT:   Head: Atraumatic.   Eyes: Pupils are equal, round, and reactive to light. EOM are normal.   Neck: Normal range of motion. Neck supple.   Cardiovascular: Normal rate and regular rhythm.   Pulmonary/Chest: Effort normal.   Abdominal: Soft. He exhibits no distension.   Musculoskeletal: Normal range of motion. He exhibits no edema or deformity.   Neurological: He is oriented to person, place, and time. No cranial nerve deficit. Coordination normal.   Skin: Skin is warm and dry.   Psychiatric: He has a normal mood and affect. His behavior is normal.         CRANIAL NERVES     CN III, IV, VI   Pupils are equal, round, and reactive to light.  Extraocular motions are normal.        Significant Labs:   CBC:   Recent Labs   Lab 02/22/20  0540 02/22/20  2105 02/23/20  0122   WBC 12.39 2.76* 4.38   HGB 12.0* 10.5* 10.4*   HCT 39.6* 32.7* 32.1*   * 283 299     CMP:   Recent Labs   Lab 02/22/20  0540  02/23/20  0122 02/23/20  0534 02/23/20  0919   *   < > 132* 130* 130*   K 5.1   < > 4.1 3.9 4.6   CL 95   < > 104 103 103   CO2 7*   < > 19* 18* 18*   *   < > 157* 263* 277*   BUN 21*   < > 20 19 18   CREATININE 1.9*   < > 1.4 1.2 1.2   CALCIUM 9.4   < > 8.1* 7.8* 8.0*   PROT 8.8*  --   --   --   --    ALBUMIN 2.6*  --   --   --   --    BILITOT 0.6  --   --   --   --    ALKPHOS 129  --   --   --   --    AST 8*  --   --   --   --    ALT 8*  --   --   --   --    ANIONGAP 28*   < > 9 9 9   EGFRNONAA 39*   < > 57* >60 >60    < > = values in this interval not displayed.       Significant Imaging: reviewed.      Assessment/Plan:      * Diabetic ketoacidosis without coma associated with diabetes mellitus due to  underlying condition    he is in DKA with blood sugar of 635,AG of 26 and CO 2 of 6,he has benen started on DKA protocol with aggressive IVF,insulin gtt,BMP Q 4 hours,.  AG is closed,started on SQ insulin,started on diet.      Bacteremia due to Staphylococcus aureus    Has bacteremia,staph aureus,likely from IV heroin use,alreday on Vanc.repeated blood culture,Echo to R/O endocarditis.if Echo show endocarditis need ID consult      RAMYA (acute kidney injury)  Duo to DKA,dehyration,on IVF,will monitor.resolved.      Pneumonia due to infectious organism  Per clinical presentation and imaging,will continue with IV Abx.      Chronic hepatitis C  Will monitor.      Intravenous drug abuse  Consulted brisa avoid illicit,blood culture grow staph,,on vanc.      Hyperlipidemia  On stain.      Pancreatitis, chronic  Stable ,at this  time.      Essential hypertension  Stable at this time with no med's,will monitor.      Type 2 diabetes mellitus, uncontrolled  Non complint with his insulin,he was consulted.        VTE Risk Mitigation (From admission, onward)         Ordered     enoxaparin injection 40 mg  Daily      02/22/20 1157     IP VTE HIGH RISK PATIENT  Once      02/22/20 1157     Place DAWIT hose  Until discontinued      02/22/20 1157                Critical care time spent on the evaluation and treatment of severe organ dysfunction, review of pertinent labs and imaging studies, discussions with consulting providers and discussions with patient/family: over 45  minutes.      Kalyani Alberto MD  Department of Hospital Medicine   [facility]

## 2020-02-23 NOTE — NURSING
Discussed with Dr. Rose that the patient reports that he feels like he is unable to eat at this time. To continue insulin infusion and reassess ability to tolerate PO in the morning.

## 2020-02-23 NOTE — NURSING
"Asking for pain medication because he is sore "all over".  States "I'm going to leave [place] and go to [facility] if I can't get anything for this pain.  Offered Tylenol for pain but he states that does not help.  "

## 2020-02-23 NOTE — HOSPITAL COURSE
54-year-old male past medical history diabetes, hepatitis, hypertension history of polysubstance abuse,IV heroin,chronic  pancreatitis presents with abdominal pain for the last 2 days with nausea and vomiting home, and worsening body aches over the last day.  Patient reports he has not had his insulin in some time and reports his glucose is very high.  He states using IV heroin last use few days ago reportedly.  He reports coughing significant amount denies any phlegm production, denies any blood in his sputum, or any chest pain. Denies any blood in her stool, reports normal last bowel movement, reports he is urinating as normal and reports he is extremely thirsty and hungry.  Denies any fevers/chills, no extremity weakness/numbness/tingling.he is in DKA with blood sugar of 635,AG of 26 and CO 2 of 6,he has been  started on DKA protocol,RAMYA with CRT of 1.7,chest  ray show also sign of pneumonia,patient has been started on broad spectrum IV Abx afterr bload culture is done,  AG is closed,started on SQ insulin,started on diet.  Has bacteremia,staph aureus,likely from IV heroin use,alreday on Vanc.repeated blood culture,Echo to R/Oed out  endocarditis.  On Zosyn and azithromycin for pneumonia.  Repeat blood culture no growth,consulted SW for LTAC placement,he is IV drug user,can not go home.reperat blood culture show growth,will place picc line.  Afrberile,no place in LTAC yet.  Podiatry cleaned right heel callus,  Afebrile.  Require more oxygen supplement,chest X ray show possible aspiration,adjusted Abx,changed Rocephin to zosyn,added IS,aspiration precaution,consulted ST.on puree diet,oxygenation is improved,but he is septic with fever,leucocytiosis,tachycardia,tachypnea,likely duo to pneumonia.repeated blood culture.no growth,repeat chest x ray show some improvement.  Consulted PT,OT.  HH is drop today with no sign of bleeding,will transfuse pack of RBC.  Patient refused GI evaluation,EGD for esophogeal  dysmotility.  He is again febrile,blood culture is repeated,picc line remove and send to culture,will follow up with cultures,  Will repeat chest X ray.  Has abnormal head CT ,no acute process,but Prominent ventricular system, may be greater than expected for generalized cerebral volume loss.  Normal pressure hydrocephalus is a consideration,consulted neurology.  CT chest show septic emboli,alreday on broad spectrum IV Abx,  Finally he is agree with EGD,show only gastritis,on PPI.  The patient completed his Abx on 3/8.  PT/OT rec: H/H with shower chair and bedside commode. The patient will be discharged to home today. Activity as tolerated. Diet low NA, ADA 1800 lexie diet    Follow up with PCP in one week.

## 2020-02-23 NOTE — NURSING
Call placed to EICU Dr. Rose w/ repeat BMP. Gap closed and CO2 19, K 4.1. Orders received for KPhos infusion and Zithromax IVPB. States will assess at 0430 if the patient will eat breakfast and order Levemir at the time and to stop the insulin infusion w/ breakfast tray.

## 2020-02-23 NOTE — EICU
Rounding (Video Assessment):  No    Intervention Initiated From:  Bedside    Reyes Communicated with Bedside Nurse regarding:  Labs    Yes       Comments:Bedside nurse called to report labs Md in conference with another pt. Informed Dr. Rose of phos of 1.4 and wbc-2.7.    01:10= Bedside nurse called to report the critical labs again to Shirley Rose.  Call transferred to  Dr. Rose.

## 2020-02-23 NOTE — EICU
Intervention Initiated From:  Bedside    Reyes Communicated with Bedside Nurse regarding:  BP    Doctor Notified:  Yes   Bedside RN called eICU over the phone to report that since rate of IVF changed to 75 ml/hr from 150 ml/hr SBP has been lower. Presently BP 79/50.   @ 20:07  Dr Rose notified via Jabber.

## 2020-02-23 NOTE — EICU
Rounding (Video Assessment):  No    Intervention Initiated From:  Bedside    Reyes Communicated with Bedside Nurse regarding:  Medication    Nurse Notified:  Yes    Doctor Notified:  Yes    Comments: Bedside nurse called to get fluid order change for dka pt with glucose now less than 200. Informed Dr. Rose.

## 2020-02-23 NOTE — NURSING TRANSFER
Nursing Transfer Note      2/23/2020     Transfer from 280 to 421    Transfer via bed    Transfer with cardiac monitoring    Transported by transport personnel and RN    Medicines sent: Insulin    Chart send with patient: yes    Notified: Pt's mother Marilou    Patient reassessed at: 1430    Upon arrival to floor:

## 2020-02-23 NOTE — NURSING
Spoke w/ Dr. Rose regarding patient states he will be able to eat breakfast. MD states that he is going to order Levemir shortly and the patient will be able to eat 3 hours after the Levemir is administered. MD doesn't want to order a cough med as the patient has a good productive cough w/ a pneumonia diagnosis.

## 2020-02-23 NOTE — NURSING
Patient continually asks for pain medication. EICU Dr. Rose states he is not writing for any pain or sleep meds for the patient. The patient has been told this numerous times that I cannot give him any pain medications without a doctor's order. Patient reports that he would like to sign out AMA if he cannot receive pain medications. Telephone w/ instructions provided to patient so that he may discuss this with his mother.

## 2020-02-23 NOTE — PLAN OF CARE
Admitted to ICU via ED on yesterday for DKA and sepsis and pneumonia. Upon the beginning of my shift the patient's rectal temp was 104.8, Tylenol, ice packs and cooling blanket utilized to decrease the patient's temperature, last check 99.1 oral. Accuchecks done every hour w/ insulin infusion as per nomogram, currently infusing at 1.6 units per hour. The patient's gap is closed and his CO2 has increased to 19, upon approaching the patient to eat he reports that he feels like he cannot eat. Dr. Rose said to continue the insulin infusion until the AM when the patient may be able to eat. The patient continually has asked for pain medications overnight and the MD will not give more than Tylenol. The patient reports to this RN that he would like to leave AMA after discussing it with his mother. A phone was provided to the patient to call his mother but has has yet to do so at this time. IVF currently D5 1/2 NS at 75/hour. The patient has a productive cough w/ brown sputum.  Remains free of falls and injuries.

## 2020-02-23 NOTE — ASSESSMENT & PLAN NOTE
he is in DKA with blood sugar of 635,AG of 26 and CO 2 of 6,he has benen started on DKA protocol with aggressive IVF,insulin gtt,BMP Q 4 hours,.  AG is closed,started on SQ insulin,started on diet.

## 2020-02-23 NOTE — EICU
EICU    Pt here with DKA after being off insulin for an extended period of time, recent heroine use, and no clear infectious symptoms/etiology.   on insulin gtt, will d/c NS and start D5 1/2 NS.  K 4.2 with creat down to 1.5, will give po potassium dose now as well.  May require more crystalloid, will determine based on uop, hemodynamics, creat response.      Addendum  Temp 104, bp 79/50 when fluids switched to D5 1/2 NS  - cooling blanket  - receiving abx for pna  - will give liter LR as creat improving with 2L NC, low threshold for norepi gtt  - right mid lung infiltrate c/w pna, smear with 23% EOS; ?noninfectious pneumonia, acute eos pna typically does not have peripheral eosinophilia and these pts often require mech ventilation, but will need to keep in mind; ekg, then add azithro; urine strep and legionella ordered; would consider BAL if eos persist and pt not responding to abx and/or pt requires mech vent  - repeating all labs now, lactate  - pt seen on monitor nonlabored breathing, sleeping, d/w bedside nurse    Addendum 0116  Called by nursing that lab called to say EOS % was an error; wbc was 12 with P 66 18% bands M 7 L 3.  PT bp better, bladder scan with 500 cc ---> will place jean, d/c asap  - ekg sinus tach flat twaves I/avl QTc 463, stasrt azithromycin

## 2020-02-24 PROBLEM — N17.9 AKI (ACUTE KIDNEY INJURY): Status: RESOLVED | Noted: 2020-02-22 | Resolved: 2020-02-24

## 2020-02-24 LAB
ANION GAP SERPL CALC-SCNC: 10 MMOL/L (ref 8–16)
AORTIC ROOT ANNULUS: 2.6 CM
AORTIC VALVE CUSP SEPERATION: 1.52 CM
AV INDEX (PROSTH): 0.77
AV MEAN GRADIENT: 1 MMHG
AV PEAK GRADIENT: 2 MMHG
AV VALVE AREA: 2.06 CM2
AV VELOCITY RATIO: 0.91
BACTERIA BLD CULT: ABNORMAL
BASOPHILS # BLD AUTO: 0.06 K/UL (ref 0–0.2)
BASOPHILS NFR BLD: 1.5 % (ref 0–1.9)
BSA FOR ECHO PROCEDURE: 1.66 M2
BUN SERPL-MCNC: 18 MG/DL (ref 6–20)
CALCIUM SERPL-MCNC: 7.9 MG/DL (ref 8.7–10.5)
CHLORIDE SERPL-SCNC: 108 MMOL/L (ref 95–110)
CO2 SERPL-SCNC: 18 MMOL/L (ref 23–29)
CREAT SERPL-MCNC: 0.8 MG/DL (ref 0.5–1.4)
CV ECHO LV RWT: 0.57 CM
DIFFERENTIAL METHOD: ABNORMAL
DOP CALC AO PEAK VEL: 0.64 M/S
DOP CALC AO VTI: 11.44 CM
DOP CALC LVOT AREA: 2.7 CM2
DOP CALC LVOT DIAMETER: 1.85 CM
DOP CALC LVOT PEAK VEL: 0.58 M/S
DOP CALC LVOT STROKE VOLUME: 23.62 CM3
DOP CALCLVOT PEAK VEL VTI: 8.79 CM
E WAVE DECELERATION TIME: 126.33 MSEC
E/A RATIO: 0.9
ECHO LV POSTERIOR WALL: 1.05 CM (ref 0.6–1.1)
EOSINOPHIL # BLD AUTO: 0 K/UL (ref 0–0.5)
EOSINOPHIL NFR BLD: 0.3 % (ref 0–8)
ERYTHROCYTE [DISTWIDTH] IN BLOOD BY AUTOMATED COUNT: 14.2 % (ref 11.5–14.5)
EST. GFR  (AFRICAN AMERICAN): >60 ML/MIN/1.73 M^2
EST. GFR  (NON AFRICAN AMERICAN): >60 ML/MIN/1.73 M^2
FRACTIONAL SHORTENING: 46 % (ref 28–44)
GLUCOSE SERPL-MCNC: 40 MG/DL (ref 70–110)
HCT VFR BLD AUTO: 32 % (ref 40–54)
HGB BLD-MCNC: 10.5 G/DL (ref 14–18)
IMM GRANULOCYTES # BLD AUTO: 0.11 K/UL (ref 0–0.04)
IMM GRANULOCYTES NFR BLD AUTO: 2.8 % (ref 0–0.5)
INTERVENTRICULAR SEPTUM: 0.83 CM (ref 0.6–1.1)
IVRT: 0.06 MSEC
LEFT ATRIUM SIZE: 2.1 CM
LEFT INTERNAL DIMENSION IN SYSTOLE: 1.99 CM (ref 2.1–4)
LEFT VENTRICLE DIASTOLIC VOLUME INDEX: 33.88 ML/M2
LEFT VENTRICLE DIASTOLIC VOLUME: 57 ML
LEFT VENTRICLE MASS INDEX: 60 G/M2
LEFT VENTRICLE SYSTOLIC VOLUME INDEX: 7.5 ML/M2
LEFT VENTRICLE SYSTOLIC VOLUME: 12.59 ML
LEFT VENTRICULAR INTERNAL DIMENSION IN DIASTOLE: 3.67 CM (ref 3.5–6)
LEFT VENTRICULAR MASS: 101.71 G
LYMPHOCYTES # BLD AUTO: 0.5 K/UL (ref 1–4.8)
LYMPHOCYTES NFR BLD: 12.5 % (ref 18–48)
MCH RBC QN AUTO: 25.9 PG (ref 27–31)
MCHC RBC AUTO-ENTMCNC: 32.8 G/DL (ref 32–36)
MCV RBC AUTO: 79 FL (ref 82–98)
MONOCYTES # BLD AUTO: 0.2 K/UL (ref 0.3–1)
MONOCYTES NFR BLD: 6.1 % (ref 4–15)
MV PEAK A VEL: 0.67 M/S
MV PEAK E VEL: 0.6 M/S
NEUTROPHILS # BLD AUTO: 3 K/UL (ref 1.8–7.7)
NEUTROPHILS NFR BLD: 76.8 % (ref 38–73)
NRBC BLD-RTO: 0 /100 WBC
PHOSPHATE SERPL-MCNC: 1.1 MG/DL (ref 2.7–4.5)
PISA TR MAX VEL: 1.43 M/S
PLATELET # BLD AUTO: 360 K/UL (ref 150–350)
PMV BLD AUTO: 10.9 FL (ref 9.2–12.9)
POCT GLUCOSE: 102 MG/DL (ref 70–110)
POCT GLUCOSE: 181 MG/DL (ref 70–110)
POCT GLUCOSE: 207 MG/DL (ref 70–110)
POCT GLUCOSE: 234 MG/DL (ref 70–110)
POCT GLUCOSE: 339 MG/DL (ref 70–110)
POCT GLUCOSE: 45 MG/DL (ref 70–110)
POCT GLUCOSE: 46 MG/DL (ref 70–110)
POCT GLUCOSE: 64 MG/DL (ref 70–110)
POCT GLUCOSE: >500 MG/DL (ref 70–110)
POTASSIUM SERPL-SCNC: 3.5 MMOL/L (ref 3.5–5.1)
RBC # BLD AUTO: 4.05 M/UL (ref 4.6–6.2)
SINUS: 2.21 CM
SODIUM SERPL-SCNC: 136 MMOL/L (ref 136–145)
STJ: 1.88 CM
TR MAX PG: 8 MMHG
TROPONIN I SERPL DL<=0.01 NG/ML-MCNC: <0.006 NG/ML (ref 0–0.03)
VANCOMYCIN TROUGH SERPL-MCNC: 5.5 UG/ML (ref 10–22)
WBC # BLD AUTO: 3.93 K/UL (ref 3.9–12.7)

## 2020-02-24 PROCEDURE — 25000003 PHARM REV CODE 250: Performed by: INTERNAL MEDICINE

## 2020-02-24 PROCEDURE — 80202 ASSAY OF VANCOMYCIN: CPT

## 2020-02-24 PROCEDURE — 11000001 HC ACUTE MED/SURG PRIVATE ROOM

## 2020-02-24 PROCEDURE — 25000003 PHARM REV CODE 250: Performed by: HOSPITALIST

## 2020-02-24 PROCEDURE — 63600175 PHARM REV CODE 636 W HCPCS: Performed by: INTERNAL MEDICINE

## 2020-02-24 PROCEDURE — 63600175 PHARM REV CODE 636 W HCPCS: Performed by: HOSPITALIST

## 2020-02-24 PROCEDURE — 80048 BASIC METABOLIC PNL TOTAL CA: CPT

## 2020-02-24 PROCEDURE — 84100 ASSAY OF PHOSPHORUS: CPT

## 2020-02-24 PROCEDURE — 36415 COLL VENOUS BLD VENIPUNCTURE: CPT

## 2020-02-24 PROCEDURE — 84484 ASSAY OF TROPONIN QUANT: CPT

## 2020-02-24 PROCEDURE — 93010 EKG 12-LEAD: ICD-10-PCS | Mod: ,,, | Performed by: INTERNAL MEDICINE

## 2020-02-24 PROCEDURE — 93010 ELECTROCARDIOGRAM REPORT: CPT | Mod: ,,, | Performed by: INTERNAL MEDICINE

## 2020-02-24 PROCEDURE — 85025 COMPLETE CBC W/AUTO DIFF WBC: CPT

## 2020-02-24 PROCEDURE — 93005 ELECTROCARDIOGRAM TRACING: CPT

## 2020-02-24 RX ORDER — TRAMADOL HYDROCHLORIDE 50 MG/1
50 TABLET ORAL EVERY 6 HOURS PRN
Status: DISCONTINUED | OUTPATIENT
Start: 2020-02-24 | End: 2020-03-03

## 2020-02-24 RX ADMIN — ATORVASTATIN CALCIUM 40 MG: 40 TABLET, FILM COATED ORAL at 09:02

## 2020-02-24 RX ADMIN — INSULIN ASPART 4 UNITS: 100 INJECTION, SOLUTION INTRAVENOUS; SUBCUTANEOUS at 09:02

## 2020-02-24 RX ADMIN — IBUPROFEN 400 MG: 400 TABLET, FILM COATED ORAL at 01:02

## 2020-02-24 RX ADMIN — INSULIN ASPART 4 UNITS: 100 INJECTION, SOLUTION INTRAVENOUS; SUBCUTANEOUS at 06:02

## 2020-02-24 RX ADMIN — PANTOPRAZOLE SODIUM 40 MG: 40 TABLET, DELAYED RELEASE ORAL at 09:02

## 2020-02-24 RX ADMIN — PIPERACILLIN AND TAZOBACTAM 4.5 G: 4; .5 INJECTION, POWDER, FOR SOLUTION INTRAVENOUS at 11:02

## 2020-02-24 RX ADMIN — POTASSIUM PHOSPHATE, MONOBASIC 500 MG: 500 TABLET, SOLUBLE ORAL at 09:02

## 2020-02-24 RX ADMIN — PIPERACILLIN AND TAZOBACTAM 4.5 G: 4; .5 INJECTION, POWDER, FOR SOLUTION INTRAVENOUS at 07:02

## 2020-02-24 RX ADMIN — AZITHROMYCIN MONOHYDRATE 500 MG: 500 INJECTION, POWDER, LYOPHILIZED, FOR SOLUTION INTRAVENOUS at 04:02

## 2020-02-24 RX ADMIN — IBUPROFEN 400 MG: 400 TABLET, FILM COATED ORAL at 06:02

## 2020-02-24 RX ADMIN — GABAPENTIN 300 MG: 300 CAPSULE ORAL at 03:02

## 2020-02-24 RX ADMIN — VANCOMYCIN HYDROCHLORIDE 750 MG: 750 INJECTION, POWDER, LYOPHILIZED, FOR SOLUTION INTRAVENOUS at 01:02

## 2020-02-24 RX ADMIN — TRAMADOL HYDROCHLORIDE 50 MG: 50 TABLET ORAL at 04:02

## 2020-02-24 RX ADMIN — IBUPROFEN 400 MG: 400 TABLET, FILM COATED ORAL at 09:02

## 2020-02-24 RX ADMIN — ACETAMINOPHEN 650 MG: 325 TABLET ORAL at 11:02

## 2020-02-24 RX ADMIN — Medication 25 G: at 06:02

## 2020-02-24 RX ADMIN — GUAIFENESIN AND DEXTROMETHORPHAN 10 ML: 100; 10 SYRUP ORAL at 09:02

## 2020-02-24 RX ADMIN — Medication 25 G: at 09:02

## 2020-02-24 RX ADMIN — GUAIFENESIN AND DEXTROMETHORPHAN 10 ML: 100; 10 SYRUP ORAL at 06:02

## 2020-02-24 RX ADMIN — ACETAMINOPHEN 650 MG: 325 TABLET ORAL at 04:02

## 2020-02-24 RX ADMIN — GABAPENTIN 300 MG: 300 CAPSULE ORAL at 09:02

## 2020-02-24 RX ADMIN — GUAIFENESIN AND DEXTROMETHORPHAN 10 ML: 100; 10 SYRUP ORAL at 03:02

## 2020-02-24 NOTE — NURSING
Pt complaining of increased chest pain. VSS, CBG now 207. MD notified, new orders given, EKG normal. Pt educated that chest pain can be associated from pneumonia due to excessive coughing, pt offered tylenol as ibuprofen is not due. Will cont to monitor.

## 2020-02-24 NOTE — SUBJECTIVE & OBJECTIVE
Interval History: Complaining of chest pain due to excessive coughing.  Patient admits that tolerance is high due to previous heroin use.    Discussed plan of care and patient in agreement.     Review of Systems   Constitutional: Positive for activity change and appetite change.   HENT: Negative for congestion and dental problem.    Eyes: Negative for discharge and itching.   Respiratory: Positive for cough. Negative for apnea and chest tightness.    Cardiovascular: Positive for chest pain. Negative for leg swelling.   Gastrointestinal: Negative for abdominal pain, nausea and vomiting.   Endocrine: Negative for cold intolerance.   Genitourinary: Negative for difficulty urinating and dysuria.   Musculoskeletal: Negative for arthralgias and back pain.   Skin: Negative for color change and pallor.   Allergic/Immunologic: Negative for environmental allergies and food allergies.   Neurological: Negative for dizziness and facial asymmetry.   Hematological: Does not bruise/bleed easily.   Psychiatric/Behavioral: Negative for agitation.     Objective:     Vital Signs (Most Recent):  Temp: 99 °F (37.2 °C) (02/24/20 1657)  Pulse: 99 (02/24/20 1657)  Resp: 18 (02/24/20 1657)  BP: (!) 104/59 (02/24/20 1657)  SpO2: (!) 94 % (02/24/20 1657) Vital Signs (24h Range):  Temp:  [98.2 °F (36.8 °C)-99.8 °F (37.7 °C)] 99 °F (37.2 °C)  Pulse:  [] 99  Resp:  [18] 18  SpO2:  [94 %-96 %] 94 %  BP: ()/(50-63) 104/59     Weight: 57.4 kg (126 lb 8.7 oz)  Body mass index is 19.24 kg/m².    Intake/Output Summary (Last 24 hours) at 2/24/2020 6499  Last data filed at 2/24/2020 1300  Gross per 24 hour   Intake 240 ml   Output 1351 ml   Net -1111 ml      Physical Exam   Constitutional: He is oriented to person, place, and time. No distress.   HENT:   Head: Atraumatic.   Eyes: Pupils are equal, round, and reactive to light. EOM are normal.   Neck: Normal range of motion. Neck supple.   Cardiovascular: Normal rate and regular rhythm.    Pulmonary/Chest: Effort normal.   Abdominal: Soft. He exhibits no distension.   Musculoskeletal: Normal range of motion. He exhibits no edema or deformity.   Neurological: He is oriented to person, place, and time. No cranial nerve deficit. Coordination normal.   Skin: Skin is warm and dry.   Psychiatric: He has a normal mood and affect. His behavior is normal.       Significant Labs:   CBC:   Recent Labs   Lab 02/22/20  2105 02/23/20  0122 02/24/20  0506   WBC 2.76* 4.38 3.93   HGB 10.5* 10.4* 10.5*   HCT 32.7* 32.1* 32.0*    299 360*     CMP:   Recent Labs   Lab 02/23/20  0534 02/23/20  0919 02/24/20  0506   * 130* 136   K 3.9 4.6 3.5    103 108   CO2 18* 18* 18*   * 277* 40*   BUN 19 18 18   CREATININE 1.2 1.2 0.8   CALCIUM 7.8* 8.0* 7.9*   ANIONGAP 9 9 10   EGFRNONAA >60 >60 >60     All pertinent labs within the past 24 hours have been reviewed.  Microbiology Results (last 7 days)     Procedure Component Value Units Date/Time    Blood culture [[id number]] Collected:  02/23/20 1139    Order Status:  Completed Specimen:  Blood Updated:  02/24/20 1303     Blood Culture, Routine No Growth to date      No Growth to date    Blood culture [[id number]] Collected:  02/23/20 1140    Order Status:  Completed Specimen:  Blood Updated:  02/24/20 1303     Blood Culture, Routine No Growth to date      No Growth to date    Culture, Respiratory with Gram Stain [[id number]]  (Abnormal) Collected:  02/23/20 0200    Order Status:  Completed Specimen:  Respiratory from Sputum, Expectorated Updated:  02/24/20 0949     Respiratory Culture GRAM NEGATIVE RANDALL  Many  Identification and susceptibility pending        STAPHYLOCOCCUS AUREUS  Many  Susceptibility pending       Gram Stain (Respiratory) <10 epithelial cells per low power field.     Gram Stain (Respiratory) Moderate WBC's     Gram Stain (Respiratory) Moderate Gram positive cocci in clusters     Gram Stain (Respiratory) Few Gram positive rods    Blood  Culture #2 **CANNOT BE ORDERED STAT** [[id number]]  (Abnormal)  (Susceptibility) Collected:  02/22/20 0600    Order Status:  Completed Specimen:  Blood from Peripheral, Antecubital, Left Updated:  02/24/20 0752     Blood Culture, Routine Gram stain edison bottle: Gram positive cocci in clusters resembling Staph       02/22/2020  22:04        Results called to and read back by: BRITT FRAUSTO/[id number]      Gram stain edison bottle: Gram positive cocci in clusters resembling Staph       Positive results previously called      METHICILLIN RESISTANT STAPHYLOCOCCUS AUREUS  Called to Niharika BUTTS  02/24/2020  07:52      Blood Culture #1 **CANNOT BE ORDERED STAT** [[id number]] Collected:  02/22/20 0540    Order Status:  Completed Specimen:  Blood from Line, Jugular, External Right Updated:  02/24/20 0703     Blood Culture, Routine No Growth to date      No Growth to date      No Growth to date    Culture, Respiratory with Gram Stain [[id number]]     Order Status:  Canceled Specimen:  Respiratory             Significant Imaging: I have reviewed all pertinent imaging results/findings within the past 24 hours.

## 2020-02-24 NOTE — NURSING
CBG 45 post breakfast. Pt at 100% of breakfast. Dr. Tran notified, amp of d50 given. Will recheck.

## 2020-02-24 NOTE — NURSING
Vanc trough noted to be low, pharmacy notified, awaiting dose change prior to administering vanc.    PT also requesting IV be changed prior to abx infusion. Will cont to monitor.

## 2020-02-24 NOTE — PLAN OF CARE
Problem: Adult Inpatient Plan of Care  Goal: Plan of Care Review  Outcome: Ongoing, Progressing  Flowsheets (Taken 2/24/2020 1638)  Plan of Care Reviewed With: patient     Patient remained free from falls, trauma, and injuries throughout shift. No complaints of nausea or vomiting. Pain controlled with prn analgesics. IV fluids, IV antibiotics, and medications administered as prescribed. Patient coughing up brown tinged sputum. Patient had multiple loose bowel movements during shift. No acute distress noted.

## 2020-02-24 NOTE — PROGRESS NOTES
"[facility]  Adult Nutrition  Progress Note    SUMMARY       Recommendations    1. Continue w/ current diet order     2. Encourage adequate intake of meals/ONS daily     Goals: 1. >75% of EEN, EPN by RD follow up.   Nutrition Goal Status: new  Communication of RD Recs: (POC)    Reason for Assessment    Reason For Assessment: RD follow-up  Diagnosis: (DKA)  Relevant Medical History: DM, HTN, pancreatitis, IVDU  Interdisciplinary Rounds: did not attend    General Information Comments: Pt seen this afternoon while eating lunch; c/o chewing difficulty 2/2 poor dentition. Pt willing to have diet texture modified as well as to have ONS ordered.  Pt reports fluctuating wt for years he attributes to heroin abuse. States he has not used any in about a month. Pt also being treated for pna. NFPE performed today & pt w/ mild muscle wasting but adequate fat stores. Pt declined my offer to provide diet educ stating "I've had it for years. I know what to do."  A1C noted to be 11.0    Nutrition Discharge Planning: Adequate nutrition to meet needs via diabetic diet.     Nutrition Risk Screen    Nutrition Risk Screen: no indicators present    Nutrition/Diet History    Spiritual, Cultural Beliefs, Jewish Practices, Values that Affect Care: no  Food Allergies: NKFA  Factors Affecting Nutritional Intake: None identified at this time    Anthropometrics    Temp: 98.2 °F (36.8 °C)  Height Method: Stated  Height: 5' 8" (172.7 cm)  Height (inches): 68 in  Weight Method: Bed Scale  Weight: 57.4 kg (126 lb 8.7 oz)  Weight (lb): 126.55 lb  Ideal Body Weight (IBW), Male: 154 lb  % Ideal Body Weight, Male (lb): 82.18 %  BMI (Calculated): 19.2  BMI Grade: 18.5-24.9 - normal  Usual Body Weight (UBW), k kg  % Usual Body Weight: 103.25  % Weight Change From Usual Weight: 3.03 %       Lab/Procedures/Meds    Pertinent Labs Reviewed: reviewed  Pertinent Labs Comments: Glu 40, POCT glu , Phos 1.1, A1C 11.0  Pertinent " Medications Reviewed: reviewed  Pertinent Medications Comments: statin, insulin, pantoprazole, KPhos, senna-docusate    Estimated/Assessed Needs    Weight Used For Calorie Calculations: 57.4 kg (126 lb 8.7 oz)  Energy Calorie Requirements (kcal): 1741  Energy Need Method: [place]-St Jeor(x 1.25 (PAL))     Protein Requirements: 69-75 g (1.2-1.3 g/kg)  Weight Used For Protein Calculations: 57.4 kg (126 lb 8.7 oz)     Fluid Requirements (mL): 1mL/kcal or per MD  Estimated Fluid Requirement Method: RDA Method  RDA Method (mL): 1741  CHO Requirement: 215 g daily      Nutrition Prescription Ordered    Current Diet Order: 2000 lexie diabetic    Evaluation of Received Nutrient/Fluid Intake    Energy Calories Required: meeting needs  Protein Required: not meeting needs  Fluid Required: meeting needs  Comments: LBM 2/23; good uop  Tolerance: tolerating  % Intake of Estimated Energy Needs: 75 - 100 %  % Meal Intake: 75 - 100 %    Nutrition Risk    Level of Risk/Frequency of Follow-up: (F/u 1 x weekly)     Assessment and Plan    Nutrition Problem  Inadequate energy intake     Related to (etiology):   NPO; poor appetite pta     Signs and Symptoms (as evidenced by):   <85% of EEN/EPN being met     Interventions (treatment strategy):  Collaboration with other providers  Commercial beverage     Nutrition Diagnosis Status:   Improving    Monitor and Evaluation    Food and Nutrient Intake: energy intake, food and beverage intake  Food and Nutrient Adminstration: diet order  Knowledge/Beliefs/Attitudes: food and nutrition knowledge/skill  Physical Activity and Function: nutrition-related ADLs and IADLs  Anthropometric Measurements: weight, weight change  Biochemical Data, Medical Tests and Procedures: electrolyte and renal panel, glucose/endocrine profile  Nutrition-Focused Physical Findings: overall appearance     Malnutrition Assessment     Skin (Micronutrient): none           Orbital Region (Subcutaneous Fat Loss): well  nourished  Upper Arm Region (Subcutaneous Fat Loss): well nourished  Thoracic and Lumbar Region: well nourished   Rastafari Region (Muscle Loss): mild depletion  Clavicle Bone Region (Muscle Loss): mild depletion  Clavicle and Acromion Bone Region (Muscle Loss): mild depletion  Dorsal Hand (Muscle Loss): mild depletion  Patellar Region (Muscle Loss): well nourished  Anterior Thigh Region (Muscle Loss): well nourished  Posterior Calf Region (Muscle Loss): well nourished   Edema (Fluid Accumulation): 0-->no edema present   Subcutaneous Fat Loss (Final Summary): well nourished  Muscle Loss Evaluation (Final Summary): mild protein-calorie malnutrition         Nutrition Follow-Up    RD Follow-up?: Yes

## 2020-02-24 NOTE — NURSING
Called to patient's room by patient who stated he believes his blood sugar is low. Patient's blood glucose is 46. Patient is asymptomatic. Pushed 1 amp of D50; will reassess blood glucose in 30 minutes.

## 2020-02-24 NOTE — PLAN OF CARE
Problem: Adult Inpatient Plan of Care  Goal: Plan of Care Review  2/24/2020 0751 by Patito Kenny RN  Outcome: Ongoing, Progressing  2/24/2020 0745 by Patito Kenny RN  Outcome: Ongoing, Progressing  Flowsheets (Taken 2/24/2020 0745)  Plan of Care Reviewed With: patient  Goal: Patient-Specific Goal (Individualization)  2/24/2020 0751 by Patito Kenny RN  Outcome: Ongoing, Progressing  2/24/2020 0745 by Patito Kenny RN  Outcome: Ongoing, Progressing  Goal: Absence of Hospital-Acquired Illness or Injury  2/24/2020 0751 by Patito Kenny RN  Outcome: Ongoing, Progressing  2/24/2020 0745 by Patito Kenny RN  Outcome: Ongoing, Progressing  Goal: Optimal Comfort and Wellbeing  2/24/2020 0751 by Patito Kenny RN  Outcome: Ongoing, Progressing  2/24/2020 0745 by Patito Kenny RN  Outcome: Ongoing, Progressing  Goal: Readiness for Transition of Care  2/24/2020 0751 by Patito Kenny RN  Outcome: Ongoing, Progressing  2/24/2020 0745 by Patito Kenny RN  Outcome: Ongoing, Progressing  Goal: Rounds/Family Conference  2/24/2020 0751 by Patito Kenny RN  Outcome: Ongoing, Progressing  2/24/2020 0745 by Patito Kenny RN  Outcome: Ongoing, Progressing     Problem: Diabetes Comorbidity  Goal: Blood Glucose Level Within Desired Range  2/24/2020 0751 by Patito Kenny RN  Outcome: Ongoing, Progressing  2/24/2020 0745 by Patito Kenny RN  Outcome: Ongoing, Progressing     Problem: Diabetic Ketoacidosis  Goal: Fluid and Electrolyte Balance with Absence of Ketosis  2/24/2020 0751 by Patito Kenny RN  Outcome: Ongoing, Progressing  2/24/2020 0745 by Patito Kenny RN  Outcome: Ongoing, Progressing     Problem: Oral Intake Inadequate  Goal: Improved Oral Intake  2/24/2020 0751 by Patito Kenny RN  Outcome: Ongoing, Progressing  2/24/2020 0745 by Patito Kenny RN  Outcome: Ongoing, Progressing     Problem: Skin Injury Risk  Increased  Goal: Skin Health and Integrity  2/24/2020 0751 by Patito Kenny RN  Outcome: Ongoing, Progressing  2/24/2020 0745 by Patito Kenny RN  Outcome: Ongoing, Progressing     Problem: Fall Injury Risk  Goal: Absence of Fall and Fall-Related Injury  2/24/2020 0751 by Patito Kenny RN  Outcome: Ongoing, Progressing  2/24/2020 0745 by Patito Kenny RN  Outcome: Ongoing, Progressing     Problem: Infection  Goal: Infection Symptom Resolution  2/24/2020 0751 by Patito Kenny RN  Outcome: Ongoing, Progressing  2/24/2020 0745 by Patito Kenny RN  Outcome: Ongoing, Progressing

## 2020-02-24 NOTE — NURSING
Report received from   AIDAN Bustamante RN. Patient lying in bed resting comfortably, but easily aroused.  No acute cardiac or respiratory distress noted. Safety measures maintained; wheels locked, bed in lowest position, bed alarm active and engaged, and call light in reach. Will continue to monitor.

## 2020-02-25 LAB
ANION GAP SERPL CALC-SCNC: 8 MMOL/L (ref 8–16)
BACTERIA SPEC AEROBE CULT: ABNORMAL
BACTERIA SPEC AEROBE CULT: ABNORMAL
BASOPHILS # BLD AUTO: ABNORMAL K/UL (ref 0–0.2)
BASOPHILS NFR BLD: 0 % (ref 0–1.9)
BUN SERPL-MCNC: 17 MG/DL (ref 6–20)
CALCIUM SERPL-MCNC: 7.7 MG/DL (ref 8.7–10.5)
CHLORIDE SERPL-SCNC: 105 MMOL/L (ref 95–110)
CO2 SERPL-SCNC: 21 MMOL/L (ref 23–29)
CREAT SERPL-MCNC: 0.8 MG/DL (ref 0.5–1.4)
DIFFERENTIAL METHOD: ABNORMAL
EOSINOPHIL # BLD AUTO: ABNORMAL K/UL (ref 0–0.5)
EOSINOPHIL NFR BLD: 0 % (ref 0–8)
ERYTHROCYTE [DISTWIDTH] IN BLOOD BY AUTOMATED COUNT: 14.5 % (ref 11.5–14.5)
EST. GFR  (AFRICAN AMERICAN): >60 ML/MIN/1.73 M^2
EST. GFR  (NON AFRICAN AMERICAN): >60 ML/MIN/1.73 M^2
GLUCOSE SERPL-MCNC: 279 MG/DL (ref 70–110)
GRAM STN SPEC: ABNORMAL
HCT VFR BLD AUTO: 32 % (ref 40–54)
HGB BLD-MCNC: 10.5 G/DL (ref 14–18)
IMM GRANULOCYTES # BLD AUTO: ABNORMAL K/UL (ref 0–0.04)
IMM GRANULOCYTES NFR BLD AUTO: ABNORMAL % (ref 0–0.5)
LYMPHOCYTES # BLD AUTO: ABNORMAL K/UL (ref 1–4.8)
LYMPHOCYTES NFR BLD: 11 % (ref 18–48)
MCH RBC QN AUTO: 25.7 PG (ref 27–31)
MCHC RBC AUTO-ENTMCNC: 32.8 G/DL (ref 32–36)
MCV RBC AUTO: 78 FL (ref 82–98)
METAMYELOCYTES NFR BLD MANUAL: 1 %
MONOCYTES # BLD AUTO: ABNORMAL K/UL (ref 0.3–1)
MONOCYTES NFR BLD: 6 % (ref 4–15)
MYELOCYTES NFR BLD MANUAL: 1 %
NEUTROPHILS NFR BLD: 71 % (ref 38–73)
NEUTS BAND NFR BLD MANUAL: 10 %
NRBC BLD-RTO: 0 /100 WBC
PHOSPHATE SERPL-MCNC: 1.5 MG/DL (ref 2.7–4.5)
PLATELET # BLD AUTO: 285 K/UL (ref 150–350)
PMV BLD AUTO: 9.5 FL (ref 9.2–12.9)
POCT GLUCOSE: 261 MG/DL (ref 70–110)
POCT GLUCOSE: 271 MG/DL (ref 70–110)
POCT GLUCOSE: 284 MG/DL (ref 70–110)
POCT GLUCOSE: 297 MG/DL (ref 70–110)
POCT GLUCOSE: 332 MG/DL (ref 70–110)
POTASSIUM SERPL-SCNC: 3.3 MMOL/L (ref 3.5–5.1)
RBC # BLD AUTO: 4.09 M/UL (ref 4.6–6.2)
SODIUM SERPL-SCNC: 134 MMOL/L (ref 136–145)
WBC # BLD AUTO: 5.15 K/UL (ref 3.9–12.7)

## 2020-02-25 PROCEDURE — 25000003 PHARM REV CODE 250: Performed by: INTERNAL MEDICINE

## 2020-02-25 PROCEDURE — 63600175 PHARM REV CODE 636 W HCPCS: Performed by: HOSPITALIST

## 2020-02-25 PROCEDURE — 84100 ASSAY OF PHOSPHORUS: CPT

## 2020-02-25 PROCEDURE — 80048 BASIC METABOLIC PNL TOTAL CA: CPT

## 2020-02-25 PROCEDURE — 36415 COLL VENOUS BLD VENIPUNCTURE: CPT

## 2020-02-25 PROCEDURE — 85007 BL SMEAR W/DIFF WBC COUNT: CPT

## 2020-02-25 PROCEDURE — 63600175 PHARM REV CODE 636 W HCPCS: Performed by: INTERNAL MEDICINE

## 2020-02-25 PROCEDURE — 25000003 PHARM REV CODE 250: Performed by: HOSPITALIST

## 2020-02-25 PROCEDURE — 11000001 HC ACUTE MED/SURG PRIVATE ROOM

## 2020-02-25 PROCEDURE — 85027 COMPLETE CBC AUTOMATED: CPT

## 2020-02-25 RX ORDER — INSULIN ASPART 100 [IU]/ML
8 INJECTION, SOLUTION INTRAVENOUS; SUBCUTANEOUS
Status: DISCONTINUED | OUTPATIENT
Start: 2020-02-25 | End: 2020-02-27

## 2020-02-25 RX ADMIN — POTASSIUM PHOSPHATE, MONOBASIC 500 MG: 500 TABLET, SOLUBLE ORAL at 08:02

## 2020-02-25 RX ADMIN — GUAIFENESIN AND DEXTROMETHORPHAN 10 ML: 100; 10 SYRUP ORAL at 03:02

## 2020-02-25 RX ADMIN — INSULIN ASPART 8 UNITS: 100 INJECTION, SOLUTION INTRAVENOUS; SUBCUTANEOUS at 01:02

## 2020-02-25 RX ADMIN — DOCUSATE SODIUM - SENNOSIDES 1 TABLET: 50; 8.6 TABLET, FILM COATED ORAL at 08:02

## 2020-02-25 RX ADMIN — CEFTRIAXONE SODIUM 2 G: 2 INJECTION, SOLUTION INTRAVENOUS at 04:02

## 2020-02-25 RX ADMIN — TRAMADOL HYDROCHLORIDE 50 MG: 50 TABLET ORAL at 09:02

## 2020-02-25 RX ADMIN — GABAPENTIN 300 MG: 300 CAPSULE ORAL at 03:02

## 2020-02-25 RX ADMIN — IBUPROFEN 400 MG: 400 TABLET, FILM COATED ORAL at 12:02

## 2020-02-25 RX ADMIN — GABAPENTIN 300 MG: 300 CAPSULE ORAL at 09:02

## 2020-02-25 RX ADMIN — PIPERACILLIN AND TAZOBACTAM 4.5 G: 4; .5 INJECTION, POWDER, FOR SOLUTION INTRAVENOUS at 03:02

## 2020-02-25 RX ADMIN — TRAMADOL HYDROCHLORIDE 50 MG: 50 TABLET ORAL at 02:02

## 2020-02-25 RX ADMIN — VANCOMYCIN HYDROCHLORIDE 750 MG: 750 INJECTION, POWDER, LYOPHILIZED, FOR SOLUTION INTRAVENOUS at 12:02

## 2020-02-25 RX ADMIN — AZITHROMYCIN MONOHYDRATE 500 MG: 500 INJECTION, POWDER, LYOPHILIZED, FOR SOLUTION INTRAVENOUS at 08:02

## 2020-02-25 RX ADMIN — ENOXAPARIN SODIUM 40 MG: 100 INJECTION SUBCUTANEOUS at 04:02

## 2020-02-25 RX ADMIN — TRAMADOL HYDROCHLORIDE 50 MG: 50 TABLET ORAL at 07:02

## 2020-02-25 RX ADMIN — INSULIN ASPART 6 UNITS: 100 INJECTION, SOLUTION INTRAVENOUS; SUBCUTANEOUS at 01:02

## 2020-02-25 RX ADMIN — ATORVASTATIN CALCIUM 40 MG: 40 TABLET, FILM COATED ORAL at 08:02

## 2020-02-25 RX ADMIN — GUAIFENESIN AND DEXTROMETHORPHAN 10 ML: 100; 10 SYRUP ORAL at 04:02

## 2020-02-25 RX ADMIN — INSULIN ASPART 8 UNITS: 100 INJECTION, SOLUTION INTRAVENOUS; SUBCUTANEOUS at 05:02

## 2020-02-25 RX ADMIN — PANTOPRAZOLE SODIUM 40 MG: 40 TABLET, DELAYED RELEASE ORAL at 08:02

## 2020-02-25 RX ADMIN — INSULIN ASPART 6 UNITS: 100 INJECTION, SOLUTION INTRAVENOUS; SUBCUTANEOUS at 08:02

## 2020-02-25 RX ADMIN — INSULIN ASPART 6 UNITS: 100 INJECTION, SOLUTION INTRAVENOUS; SUBCUTANEOUS at 05:02

## 2020-02-25 RX ADMIN — VANCOMYCIN HYDROCHLORIDE 750 MG: 750 INJECTION, POWDER, LYOPHILIZED, FOR SOLUTION INTRAVENOUS at 02:02

## 2020-02-25 RX ADMIN — INSULIN ASPART 3 UNITS: 100 INJECTION, SOLUTION INTRAVENOUS; SUBCUTANEOUS at 09:02

## 2020-02-25 RX ADMIN — GABAPENTIN 300 MG: 300 CAPSULE ORAL at 08:02

## 2020-02-25 NOTE — SUBJECTIVE & OBJECTIVE
Past Medical History:   Diagnosis Date    Diabetes mellitus     Hepatitis     HTN (hypertension)     Pancreatitis        Past Surgical History:   Procedure Laterality Date    CARDIAC SURGERY  1999    stent placed. ([facility])    SHOULDER SURGERY  2013    right shoulder, spur removal.       Review of patient's allergies indicates:  No Known Allergies    No current facility-administered medications on file prior to encounter.      Current Outpatient Medications on File Prior to Encounter   Medication Sig    atorvastatin (LIPITOR) 40 MG tablet Take 40 mg by mouth once daily.    gabapentin (NEURONTIN) 300 MG capsule Take 300 mg by mouth.    ibuprofen (ADVIL,MOTRIN) 600 MG tablet Take 1 tablet (600 mg total) by mouth every 6 (six) hours as needed for Pain.    insulin aspart U-100 (NOVOLOG) 100 unit/mL (3 mL) InPn pen Inject 10 Units into the skin.    LANTUS 100 unit/mL injection Inject 15 Units into the skin 2 (two) times daily. (Patient taking differently: Inject 30 Units into the skin once daily. )    naloxone (NARCAN) 1 mg/mL injection 2 mg (1 mg per nostril) by Nasal route as needed for opioid overdose; may repeat in 3 to 5 minutes if not effective. Call 911    naloxone (NARCAN) 4 mg/actuation Spry 4mg by nasal route as needed for opioid overdose; may repeat every 2-3 minutes in alternating nostrils until medical help arrives. Call 911    nicotine (NICODERM CQ) 21 mg/24 hr Place 1 patch onto the skin once daily.    omeprazole (PRILOSEC) 40 MG capsule Take 1 capsule (40 mg total) by mouth once daily.     Family History     Problem Relation (Age of Onset)    Asthma Mother        Tobacco Use    Smoking status: Current Every Day Smoker     Packs/day: 1.00     Years: 20.00     Pack years: 20.00     Types: Cigarettes     Start date: 4/3/1981    Smokeless tobacco: Never Used   Substance and Sexual Activity    Alcohol use: No     Alcohol/week: 0.0 standard drinks    Drug use: Yes     Types: IV,  Marijuana, Heroin     Comment: currently, used today, herion    Sexual activity: Yes     Partners: Female     Review of Systems   Constitutional: Positive for activity change and appetite change.   HENT: Negative for congestion and dental problem.    Eyes: Negative for discharge and itching.   Respiratory: Positive for cough. Negative for apnea and chest tightness.    Cardiovascular: Negative for chest pain and leg swelling.   Gastrointestinal: Negative for abdominal pain, nausea and vomiting.   Endocrine: Negative for cold intolerance.   Genitourinary: Negative for difficulty urinating and dysuria.   Musculoskeletal: Negative for arthralgias and back pain.   Skin: Negative for color change and pallor.   Allergic/Immunologic: Negative for environmental allergies and food allergies.   Neurological: Negative for dizziness and facial asymmetry.   Hematological: Does not bruise/bleed easily.   Psychiatric/Behavioral: Negative for agitation.     Objective:     Vital Signs (Most Recent):  Temp: 98.1 °F (36.7 °C) (02/25/20 1110)  Pulse: 102 (02/25/20 1110)  Resp: 18 (02/25/20 1110)  BP: 119/66 (02/25/20 1110)  SpO2: 97 % (02/25/20 1110) Vital Signs (24h Range):  Temp:  [98 °F (36.7 °C)-99 °F (37.2 °C)] 98.1 °F (36.7 °C)  Pulse:  [] 102  Resp:  [18-19] 18  SpO2:  [94 %-97 %] 97 %  BP: (104-121)/(57-66) 119/66     Weight: 57.4 kg (126 lb 8.7 oz)  Body mass index is 19.24 kg/m².    Physical Exam   Constitutional: He is oriented to person, place, and time. No distress.   HENT:   Head: Atraumatic.   Eyes: Pupils are equal, round, and reactive to light. EOM are normal.   Neck: Normal range of motion. Neck supple.   Cardiovascular: Normal rate and regular rhythm.   Pulmonary/Chest: Effort normal.   Abdominal: Soft. He exhibits no distension.   Musculoskeletal: Normal range of motion. He exhibits no edema or deformity.   Neurological: He is oriented to person, place, and time. No cranial nerve deficit. Coordination normal.    Skin: Skin is warm and dry.   Psychiatric: He has a normal mood and affect. His behavior is normal.         CRANIAL NERVES     CN III, IV, VI   Pupils are equal, round, and reactive to light.  Extraocular motions are normal.        Significant Labs:   CBC:   Recent Labs   Lab 02/24/20  0506 02/25/20  0814   WBC 3.93 5.15   HGB 10.5* 10.5*   HCT 32.0* 32.0*   * 285     CMP:   Recent Labs   Lab 02/24/20  0506 02/25/20  0814    134*   K 3.5 3.3*    105   CO2 18* 21*   GLU 40* 279*   BUN 18 17   CREATININE 0.8 0.8   CALCIUM 7.9* 7.7*   ANIONGAP 10 8   EGFRNONAA >60 >60       Significant Imaging: reviewed.

## 2020-02-25 NOTE — ASSESSMENT & PLAN NOTE
Blood cultures (+) MRSA  Likely from IV heroin use  Continue Vancomycin  Blood cultures repeated  Echo performed: WNL  Infectious Disease consulted.

## 2020-02-25 NOTE — ASSESSMENT & PLAN NOTE
DKA resolved  Now with hypoglycemia  Has received D50, patient eating well  If hypoglycemia persist, will start D51/2NS

## 2020-02-25 NOTE — CONSULTS
[facility]  Infectious Disease  Consult Note    Patient Name: Cali Mabry  MRN: [id number]  Admission Date: 2/22/2020  Hospital Length of Stay: 3 days  Attending Physician: Kalyani Alberto MD  Primary Care Provider: Primary Doctor No     Isolation Status: Contact        Inpatient consult to Infectious Diseases  Consult performed by: Zonia Pereira MD  Consult ordered by: Hedy Tran MD        Consult received, full consult to follow    IVDU presents with MRSA bacteremia, cleared quickly on IV antibiotics, TTE with no vegetations  Also with RLL E coli and MRSA pneumonia    Continue IV vancomycin, plan for 14 day course  Discontinue pip-tazo, ceftriaxone started  Complete 5 day course of azithromycin

## 2020-02-25 NOTE — ASSESSMENT & PLAN NOTE
Currently hypoglycemia  Will monitor blood glucose q.a.c. and HS  Will adjust regimen based on blood glucose trends

## 2020-02-25 NOTE — PROGRESS NOTES
[facility] Hospital Medicine  Progress Note    Patient Name: Cali Mabry  MRN: [id number]  Patient Class: IP- Inpatient   Admission Date: 2/22/2020  Length of Stay: 3 days  Attending Physician: Kalyani Alberto MD  Primary Care Provider: Primary Doctor No        Subjective:     Principal Problem:Diabetic ketoacidosis without coma associated with diabetes mellitus due to underlying condition        HPI:  54-year-old male past medical history diabetes, hepatitis, hypertension history of polysubstance abuse, pancreatitis presents with abdominal pain for the last 2 days with nausea and vomiting home, and worsening body aches over the last day.  Patient reports he has not had his insulin in some time and reports his glucose is very high.  He states using IV heroin last use few days ago reportedly.  He reports coughing significant amount denies any phlegm production, denies any blood in his sputum, or any chest pain. Denies any blood in her stool, reports normal last bowel movement, reports he is urinating as normal and reports he is extremely thirsty and hungry.  Denies any fevers/chills, no extremity weakness/numbness/tingling.he is in DKA with blood sugar of 635,AG of 26 and CO 2 of 6,he has benen started on DKA protocol,RAMYA with CRT of 1.7,chest  Ray show also sign of pneumonia,patient has been started on broad spectrum IV Abx afterr bload culture is done,    Overview/Hospital Course:  54-year-old male past medical history diabetes, hepatitis, hypertension history of polysubstance abuse,IV heroin,chronic  pancreatitis presents with abdominal pain for the last 2 days with nausea and vomiting home, and worsening body aches over the last day.  Patient reports he has not had his insulin in some time and reports his glucose is very high.  He states using IV heroin last use few days ago reportedly.  He reports coughing significant amount denies any phlegm production, denies any blood in his  sputum, or any chest pain. Denies any blood in her stool, reports normal last bowel movement, reports he is urinating as normal and reports he is extremely thirsty and hungry.  Denies any fevers/chills, no extremity weakness/numbness/tingling.he is in DKA with blood sugar of 635,AG of 26 and CO 2 of 6,he has been  started on DKA protocol,RAMYA with CRT of 1.7,chest  ray show also sign of pneumonia,patient has been started on broad spectrum IV Abx afterr bload culture is done,  AG is closed,started on SQ insulin,started on diet.  Has bacteremia,staph aureus,likely from IV heroin use,alreday on Vanc.repeated blood culture,Echo to R/Oed out  endocarditis.  On Zosyn and azithromycin for pneumonia.  Repeat blood culture no growth,consulted SW for LTAC placement,he is IV drug user,can not go home.    Past Medical History:   Diagnosis Date    Diabetes mellitus     Hepatitis     HTN (hypertension)     Pancreatitis        Past Surgical History:   Procedure Laterality Date    CARDIAC SURGERY  1999    stent placed. ([facility])    SHOULDER SURGERY  2013    right shoulder, spur removal.       Review of patient's allergies indicates:  No Known Allergies    No current facility-administered medications on file prior to encounter.      Current Outpatient Medications on File Prior to Encounter   Medication Sig    atorvastatin (LIPITOR) 40 MG tablet Take 40 mg by mouth once daily.    gabapentin (NEURONTIN) 300 MG capsule Take 300 mg by mouth.    ibuprofen (ADVIL,MOTRIN) 600 MG tablet Take 1 tablet (600 mg total) by mouth every 6 (six) hours as needed for Pain.    insulin aspart U-100 (NOVOLOG) 100 unit/mL (3 mL) InPn pen Inject 10 Units into the skin.    LANTUS 100 unit/mL injection Inject 15 Units into the skin 2 (two) times daily. (Patient taking differently: Inject 30 Units into the skin once daily. )    naloxone (NARCAN) 1 mg/mL injection 2 mg (1 mg per nostril) by Nasal route as needed for opioid overdose; may  repeat in 3 to 5 minutes if not effective. Call 911    naloxone (NARCAN) 4 mg/actuation Spry 4mg by nasal route as needed for opioid overdose; may repeat every 2-3 minutes in alternating nostrils until medical help arrives. Call 911    nicotine (NICODERM CQ) 21 mg/24 hr Place 1 patch onto the skin once daily.    omeprazole (PRILOSEC) 40 MG capsule Take 1 capsule (40 mg total) by mouth once daily.     Family History     Problem Relation (Age of Onset)    Asthma Mother        Tobacco Use    Smoking status: Current Every Day Smoker     Packs/day: 1.00     Years: 20.00     Pack years: 20.00     Types: Cigarettes     Start date: 4/3/1981    Smokeless tobacco: Never Used   Substance and Sexual Activity    Alcohol use: No     Alcohol/week: 0.0 standard drinks    Drug use: Yes     Types: IV, Marijuana, Heroin     Comment: currently, used today, herion    Sexual activity: Yes     Partners: Female     Review of Systems   Constitutional: Positive for activity change and appetite change.   HENT: Negative for congestion and dental problem.    Eyes: Negative for discharge and itching.   Respiratory: Positive for cough. Negative for apnea and chest tightness.    Cardiovascular: Negative for chest pain and leg swelling.   Gastrointestinal: Negative for abdominal pain, nausea and vomiting.   Endocrine: Negative for cold intolerance.   Genitourinary: Negative for difficulty urinating and dysuria.   Musculoskeletal: Negative for arthralgias and back pain.   Skin: Negative for color change and pallor.   Allergic/Immunologic: Negative for environmental allergies and food allergies.   Neurological: Negative for dizziness and facial asymmetry.   Hematological: Does not bruise/bleed easily.   Psychiatric/Behavioral: Negative for agitation.     Objective:     Vital Signs (Most Recent):  Temp: 98.1 °F (36.7 °C) (02/25/20 1110)  Pulse: 102 (02/25/20 1110)  Resp: 18 (02/25/20 1110)  BP: 119/66 (02/25/20 1110)  SpO2: 97 % (02/25/20  1110) Vital Signs (24h Range):  Temp:  [98 °F (36.7 °C)-99 °F (37.2 °C)] 98.1 °F (36.7 °C)  Pulse:  [] 102  Resp:  [18-19] 18  SpO2:  [94 %-97 %] 97 %  BP: (104-121)/(57-66) 119/66     Weight: 57.4 kg (126 lb 8.7 oz)  Body mass index is 19.24 kg/m².    Physical Exam   Constitutional: He is oriented to person, place, and time. No distress.   HENT:   Head: Atraumatic.   Eyes: Pupils are equal, round, and reactive to light. EOM are normal.   Neck: Normal range of motion. Neck supple.   Cardiovascular: Normal rate and regular rhythm.   Pulmonary/Chest: Effort normal.   Abdominal: Soft. He exhibits no distension.   Musculoskeletal: Normal range of motion. He exhibits no edema or deformity.   Neurological: He is oriented to person, place, and time. No cranial nerve deficit. Coordination normal.   Skin: Skin is warm and dry.   Psychiatric: He has a normal mood and affect. His behavior is normal.         CRANIAL NERVES     CN III, IV, VI   Pupils are equal, round, and reactive to light.  Extraocular motions are normal.        Significant Labs:   CBC:   Recent Labs   Lab 02/24/20  0506 02/25/20  0814   WBC 3.93 5.15   HGB 10.5* 10.5*   HCT 32.0* 32.0*   * 285     CMP:   Recent Labs   Lab 02/24/20  0506 02/25/20  0814    134*   K 3.5 3.3*    105   CO2 18* 21*   GLU 40* 279*   BUN 18 17   CREATININE 0.8 0.8   CALCIUM 7.9* 7.7*   ANIONGAP 10 8   EGFRNONAA >60 >60       Significant Imaging: reviewed.      Assessment/Plan:      * Diabetic ketoacidosis without coma associated with diabetes mellitus due to underlying condition  DKA resolved  Now with hypoglycemia  Has received D50, patient eating well  If hypoglycemia persist, will start D51/2NS      Bacteremia due to Staphylococcus aureus  Blood cultures (+) MRSA  Likely from IV heroin use  Continue Vancomycin  Blood cultures repeated  Echo performed: WNL  Repeat blood culture no growth,consulted SW for LTAC placement,he is IV drug user,can not go  home.      Pneumonia due to infectious organism  Per clinical presentation and imaging,will continue with IV Abx.      Chronic hepatitis C  Will monitor.      Intravenous drug abuse  Consult on IV heroin cessation use  Patient states that he quit 3-4 weeks ago      Hyperlipidemia  On statin.      Pancreatitis, chronic  Stable at this  time.      Essential hypertension  Stable at this time with no meds  Will monitor throughout hospital course    Type 2 diabetes mellitus, uncontrolled  Currently hypoglycemia  Will monitor blood glucose q.a.c. and HS  Will adjust regimen based on blood glucose trends        VTE Risk Mitigation (From admission, onward)         Ordered     enoxaparin injection 40 mg  Daily      02/22/20 1157     IP VTE HIGH RISK PATIENT  Once      02/22/20 1157     Place DAWIT kwane  Until discontinued      02/22/20 1157                      Kalyani Alberto MD  Department of Hospital Medicine   [facility]

## 2020-02-25 NOTE — ASSESSMENT & PLAN NOTE
Blood cultures (+) MRSA  Likely from IV heroin use  Continue Vancomycin  Blood cultures repeated  Echo performed: WNL  Repeat blood culture no growth,consulted SW for LTAC placement,he is IV drug user,can not go home.

## 2020-02-25 NOTE — NURSING
"Pt getting extremely agitated, stating "you dont do anything to help me,I'm hurting and coughing" Pt educated that he has already gotten 3 different types of pain medications during shift and cpugh medication. Pt continues to state that he needs more, "maybe 2, 50 mg tramadols?" PT educated that his pain medication will not be adjusted, the MD is already aware and the tramadol is the only thing that was ordered. Pt continues to yell that he will leave. Pt educated that nursing can not keep him here, if he wants to leave AMA that is his right. Pt then stated "no, I'm not leaving AMA, I'm just hurting" PT needing frequent reeducation regarding pain medications and medications timing. Will cont to monitor.   "

## 2020-02-25 NOTE — PLAN OF CARE
Patient remained free from injury throughout shift.  Patient continues on IV antibiotics.   No discomfort voiced throughout shift.  Patient  blood sugars continues to be elevated insulin given per physicians orders.  Patient refused 4:00 vitals signs.  Bed in low and locked position.   Patient refused to have his labs drawn.         Problem: Adult Inpatient Plan of Care  Goal: Plan of Care Review  Outcome: Ongoing, Progressing  Flowsheets (Taken 2/25/2020 0349)  Plan of Care Reviewed With: patient  Goal: Patient-Specific Goal (Individualization)  Outcome: Ongoing, Progressing  Goal: Absence of Hospital-Acquired Illness or Injury  Outcome: Ongoing, Progressing  Goal: Optimal Comfort and Wellbeing  Outcome: Ongoing, Progressing  Goal: Readiness for Transition of Care  Outcome: Ongoing, Progressing  Goal: Rounds/Family Conference  Outcome: Ongoing, Progressing     Problem: Diabetic Ketoacidosis  Goal: Fluid and Electrolyte Balance with Absence of Ketosis  Outcome: Ongoing, Progressing     Problem: Oral Intake Inadequate  Goal: Improved Oral Intake  Outcome: Ongoing, Progressing     Problem: Skin Injury Risk Increased  Goal: Skin Health and Integrity  Outcome: Ongoing, Progressing

## 2020-02-25 NOTE — PROGRESS NOTES
[facility] Hospital Medicine  Progress Note    Patient Name: Cali Mabry  MRN: [id number]  Patient Class: IP- Inpatient   Admission Date: 2/22/2020  Length of Stay: 2 days  Attending Physician: Hedy Tran, *  Primary Care Provider: Primary Doctor No        Subjective:     Principal Problem:Diabetic ketoacidosis without coma associated with diabetes mellitus due to underlying condition        HPI:  54-year-old male past medical history diabetes, hepatitis, hypertension history of polysubstance abuse, pancreatitis presents with abdominal pain for the last 2 days with nausea and vomiting home, and worsening body aches over the last day.  Patient reports he has not had his insulin in some time and reports his glucose is very high.  He states using IV heroin last use few days ago reportedly.  He reports coughing significant amount denies any phlegm production, denies any blood in his sputum, or any chest pain. Denies any blood in her stool, reports normal last bowel movement, reports he is urinating as normal and reports he is extremely thirsty and hungry.  Denies any fevers/chills, no extremity weakness/numbness/tingling.he is in DKA with blood sugar of 635,AG of 26 and CO 2 of 6,he has benen started on DKA protocol,RAMYA with CRT of 1.7,chest  Ray show also sign of pneumonia,patient has been started on broad spectrum IV Abx afterr bload culture is done,    Overview/Hospital Course:  54-year-old male past medical history diabetes, hepatitis, hypertension history of polysubstance abuse,IV heroin,chronic  pancreatitis presents with abdominal pain for the last 2 days with nausea and vomiting home, and worsening body aches over the last day.  Patient reports he has not had his insulin in some time and reports his glucose is very high.  He states using IV heroin last use few days ago reportedly.  He reports coughing significant amount denies any phlegm production, denies any blood in his  sputum, or any chest pain. Denies any blood in her stool, reports normal last bowel movement, reports he is urinating as normal and reports he is extremely thirsty and hungry.  Denies any fevers/chills, no extremity weakness/numbness/tingling.he is in DKA with blood sugar of 635,AG of 26 and CO 2 of 6,he has been  started on DKA protocol,RAMYA with CRT of 1.7,chest  ray show also sign of pneumonia,patient has been started on broad spectrum IV Abx afterr bload culture is done,  AG is closed,started on SQ insulin,started on diet.  Has bacteremia,staph aureus,likely from IV heroin use,alreday on Vanc.repeated blood culture,Echo to R/O endocarditis.if Echo show endocarditis need ID consult.  On Zosyn and azithromycin for pneumonia.    Interval History: Complaining of chest pain due to excessive coughing.  Patient admits that tolerance is high due to previous heroin use.    Discussed plan of care and patient in agreement.     Review of Systems   Constitutional: Positive for activity change and appetite change.   HENT: Negative for congestion and dental problem.    Eyes: Negative for discharge and itching.   Respiratory: Positive for cough. Negative for apnea and chest tightness.    Cardiovascular: Positive for chest pain. Negative for leg swelling.   Gastrointestinal: Negative for abdominal pain, nausea and vomiting.   Endocrine: Negative for cold intolerance.   Genitourinary: Negative for difficulty urinating and dysuria.   Musculoskeletal: Negative for arthralgias and back pain.   Skin: Negative for color change and pallor.   Allergic/Immunologic: Negative for environmental allergies and food allergies.   Neurological: Negative for dizziness and facial asymmetry.   Hematological: Does not bruise/bleed easily.   Psychiatric/Behavioral: Negative for agitation.     Objective:     Vital Signs (Most Recent):  Temp: 99 °F (37.2 °C) (02/24/20 1657)  Pulse: 99 (02/24/20 1657)  Resp: 18 (02/24/20 1657)  BP: (!) 104/59 (02/24/20  1657)  SpO2: (!) 94 % (02/24/20 1657) Vital Signs (24h Range):  Temp:  [98.2 °F (36.8 °C)-99.8 °F (37.7 °C)] 99 °F (37.2 °C)  Pulse:  [] 99  Resp:  [18] 18  SpO2:  [94 %-96 %] 94 %  BP: ()/(50-63) 104/59     Weight: 57.4 kg (126 lb 8.7 oz)  Body mass index is 19.24 kg/m².    Intake/Output Summary (Last 24 hours) at 2/24/2020 1749  Last data filed at 2/24/2020 1300  Gross per 24 hour   Intake 240 ml   Output 1351 ml   Net -1111 ml      Physical Exam   Constitutional: He is oriented to person, place, and time. No distress.   HENT:   Head: Atraumatic.   Eyes: Pupils are equal, round, and reactive to light. EOM are normal.   Neck: Normal range of motion. Neck supple.   Cardiovascular: Normal rate and regular rhythm.   Pulmonary/Chest: Effort normal.   Abdominal: Soft. He exhibits no distension.   Musculoskeletal: Normal range of motion. He exhibits no edema or deformity.   Neurological: He is oriented to person, place, and time. No cranial nerve deficit. Coordination normal.   Skin: Skin is warm and dry.   Psychiatric: He has a normal mood and affect. His behavior is normal.       Significant Labs:   CBC:   Recent Labs   Lab 02/22/20  2105 02/23/20  0122 02/24/20  0506   WBC 2.76* 4.38 3.93   HGB 10.5* 10.4* 10.5*   HCT 32.7* 32.1* 32.0*    299 360*     CMP:   Recent Labs   Lab 02/23/20  0534 02/23/20  0919 02/24/20  0506   * 130* 136   K 3.9 4.6 3.5    103 108   CO2 18* 18* 18*   * 277* 40*   BUN 19 18 18   CREATININE 1.2 1.2 0.8   CALCIUM 7.8* 8.0* 7.9*   ANIONGAP 9 9 10   EGFRNONAA >60 >60 >60     All pertinent labs within the past 24 hours have been reviewed.  Microbiology Results (last 7 days)     Procedure Component Value Units Date/Time    Blood culture [[id number]] Collected:  02/23/20 1139    Order Status:  Completed Specimen:  Blood Updated:  02/24/20 1303     Blood Culture, Routine No Growth to date      No Growth to date    Blood culture [[id number]] Collected:  02/23/20  1140    Order Status:  Completed Specimen:  Blood Updated:  02/24/20 1303     Blood Culture, Routine No Growth to date      No Growth to date    Culture, Respiratory with Gram Stain [[id number]]  (Abnormal) Collected:  02/23/20 0200    Order Status:  Completed Specimen:  Respiratory from Sputum, Expectorated Updated:  02/24/20 0949     Respiratory Culture GRAM NEGATIVE RANDALL  Many  Identification and susceptibility pending        STAPHYLOCOCCUS AUREUS  Many  Susceptibility pending       Gram Stain (Respiratory) <10 epithelial cells per low power field.     Gram Stain (Respiratory) Moderate WBC's     Gram Stain (Respiratory) Moderate Gram positive cocci in clusters     Gram Stain (Respiratory) Few Gram positive rods    Blood Culture #2 **CANNOT BE ORDERED STAT** [[id number]]  (Abnormal)  (Susceptibility) Collected:  02/22/20 0600    Order Status:  Completed Specimen:  Blood from Peripheral, Antecubital, Left Updated:  02/24/20 0752     Blood Culture, Routine Gram stain edison bottle: Gram positive cocci in clusters resembling Staph       02/22/2020  22:04        Results called to and read back by: BRITT FRAUSTO/[id number]      Gram stain edison bottle: Gram positive cocci in clusters resembling Staph       Positive results previously called      METHICILLIN RESISTANT STAPHYLOCOCCUS AUREUS  Called to Niharika Ibanez- 4W  02/24/2020  07:52      Blood Culture #1 **CANNOT BE ORDERED STAT** [[id number]] Collected:  02/22/20 0540    Order Status:  Completed Specimen:  Blood from Line, Jugular, External Right Updated:  02/24/20 0703     Blood Culture, Routine No Growth to date      No Growth to date      No Growth to date    Culture, Respiratory with Gram Stain [[id number]]     Order Status:  Canceled Specimen:  Respiratory             Significant Imaging: I have reviewed all pertinent imaging results/findings within the past 24 hours.      Assessment/Plan:      * Diabetic ketoacidosis without coma associated with diabetes mellitus due to  underlying condition  DKA resolved  Now with hypoglycemia  Has received D50, patient eating well  If hypoglycemia persist, will start D51/2NS      Bacteremia due to Staphylococcus aureus  Blood cultures (+) MRSA  Likely from IV heroin use  Continue Vancomycin  Blood cultures repeated  Echo performed: WNL  Infectious Disease consulted.      Pneumonia due to infectious organism  Per clinical presentation and imaging,will continue with IV Abx.      Chronic hepatitis C  Will monitor.      Intravenous drug abuse  Consult on IV heroin cessation use  Patient states that he quit 3-4 weeks ago      Hyperlipidemia  On statin.      Pancreatitis, chronic  Stable at this  time.      Essential hypertension  Stable at this time with no meds  Will monitor throughout hospital course    Type 2 diabetes mellitus, uncontrolled  Currently hypoglycemia  Will monitor blood glucose q.a.c. and HS  Will adjust regimen based on blood glucose trends        VTE Risk Mitigation (From admission, onward)         Ordered     enoxaparin injection 40 mg  Daily      02/22/20 1157     IP VTE HIGH RISK PATIENT  Once      02/22/20 1157     Place DAWIT hose  Until discontinued      02/22/20 1157                      Hedy Tran MD  Department of Hospital Medicine   [facility]

## 2020-02-25 NOTE — PLAN OF CARE
"Pt agitated that  asking dc planning questions at bedside. Pt reporting that he would like to go home, as he feels that the medical team is taking to long figuring out his medical condition and needs.   SW reviewed with patient contents of "[facility]" including "help at home", "things patient responsible for to manage ___ health at home" and "preferences". Patient was able to verbalize his help at home is mother and sister. SW discussed with patient the things he is responsible for to manage his health at home would be by going on his doctor appointments, taking medications as prescribed, and getting prescriptions filled. SW wrote name and phone number on white communication board.      02/25/20 1051   Discharge Assessment   Assessment Type Discharge Planning Assessment   Confirmed/corrected address and phone number on facesheet? Yes   Assessment information obtained from? Patient   Expected Length of Stay (days) 2   Communicated expected length of stay with patient/caregiver yes   Prior to hospitilization cognitive status: Alert/Oriented   Prior to hospitalization functional status: Assistive Equipment  (cane)   Current cognitive status: Alert/Oriented   Current Functional Status: Assistive Equipment  (cane)   Facility Arrived From: home   Lives With alone   Able to Return to Prior Arrangements yes   Is patient able to care for self after discharge? Unable to determine at this time (comments)   Who are your caregiver(s) and their phone number(s)? sister, chong paredes # [phone]   Patient's perception of discharge disposition home or selfcare   Readmission Within the Last 30 Days no previous admission in last 30 days   Patient currently being followed by outpatient case management? No   Patient currently receives any other outside agency services? No   Equipment Currently Used at Home cane, straight   Do you have any problems affording any of your prescribed medications? No   Is the " patient taking medications as prescribed? yes   Does the patient have transportation home? Yes   Transportation Anticipated family or friend will provide   Dialysis Name and Scheduled days n/a   Does the patient receive services at the Coumadin Clinic? No   Discharge Plan A Home   Discharge Plan B Home   DME Needed Upon Discharge  none   Patient/Family in Agreement with Plan yes

## 2020-02-25 NOTE — PLAN OF CARE
02/25/20 1401   Post-Acute Status   Post-Acute Authorization Placement   Post-Acute Placement Status Referrals Sent  (referral sent to [facility])   Discharge Delays None known at this time   Discharge Plan   Discharge Plan A Long-term acute care facility (LTAC)   Discharge Plan B Long-term acute care facility (LTAC)     LINN consulted for LTAC placement. SW sent referral to [facility] via [facility].  SW attempted to provide update to pt's mother, Amarilis (#[phone]), but was required to leave message requesting callback.  provided update to pt's sister, Marilou ([phone]) of referral. Marilou denied having any questions at this time and reported that she will inform her mother of discharge plan. sw remains available.

## 2020-02-26 PROBLEM — B95.62 MRSA BACTEREMIA: Status: ACTIVE | Noted: 2020-02-23

## 2020-02-26 LAB
ANION GAP SERPL CALC-SCNC: 9 MMOL/L (ref 8–16)
ANISOCYTOSIS BLD QL SMEAR: SLIGHT
BASOPHILS # BLD AUTO: ABNORMAL K/UL (ref 0–0.2)
BASOPHILS NFR BLD: 0 % (ref 0–1.9)
BUN SERPL-MCNC: 15 MG/DL (ref 6–20)
CALCIUM SERPL-MCNC: 7.8 MG/DL (ref 8.7–10.5)
CHLORIDE SERPL-SCNC: 104 MMOL/L (ref 95–110)
CO2 SERPL-SCNC: 21 MMOL/L (ref 23–29)
CREAT SERPL-MCNC: 0.9 MG/DL (ref 0.5–1.4)
DIFFERENTIAL METHOD: ABNORMAL
DOHLE BOD BLD QL SMEAR: PRESENT
EOSINOPHIL # BLD AUTO: ABNORMAL K/UL (ref 0–0.5)
EOSINOPHIL NFR BLD: 1 % (ref 0–8)
ERYTHROCYTE [DISTWIDTH] IN BLOOD BY AUTOMATED COUNT: 14.6 % (ref 11.5–14.5)
EST. GFR  (AFRICAN AMERICAN): >60 ML/MIN/1.73 M^2
EST. GFR  (NON AFRICAN AMERICAN): >60 ML/MIN/1.73 M^2
GLUCOSE SERPL-MCNC: 211 MG/DL (ref 70–110)
HBV CORE AB SERPL QL IA: NEGATIVE
HBV SURFACE AB SER-ACNC: NEGATIVE M[IU]/ML
HBV SURFACE AG SERPL QL IA: NEGATIVE
HCT VFR BLD AUTO: 30.6 % (ref 40–54)
HCV AB SERPL QL IA: POSITIVE
HGB BLD-MCNC: 10 G/DL (ref 14–18)
HIV 1+2 AB+HIV1 P24 AG SERPL QL IA: NEGATIVE
HYPOCHROMIA BLD QL SMEAR: ABNORMAL
IMM GRANULOCYTES # BLD AUTO: ABNORMAL K/UL (ref 0–0.04)
IMM GRANULOCYTES NFR BLD AUTO: ABNORMAL % (ref 0–0.5)
L PNEUMO AG UR QL IA: NOT DETECTED
LYMPHOCYTES # BLD AUTO: ABNORMAL K/UL (ref 1–4.8)
LYMPHOCYTES NFR BLD: 17 % (ref 18–48)
MCH RBC QN AUTO: 25.5 PG (ref 27–31)
MCHC RBC AUTO-ENTMCNC: 32.7 G/DL (ref 32–36)
MCV RBC AUTO: 78 FL (ref 82–98)
METAMYELOCYTES NFR BLD MANUAL: 3 %
MONOCYTES # BLD AUTO: ABNORMAL K/UL (ref 0.3–1)
MONOCYTES NFR BLD: 6 % (ref 4–15)
MYELOCYTES NFR BLD MANUAL: 2 %
NEUTROPHILS # BLD AUTO: ABNORMAL K/UL (ref 1.8–7.7)
NEUTROPHILS NFR BLD: 71 % (ref 38–73)
NRBC BLD-RTO: 0 /100 WBC
PHOSPHATE SERPL-MCNC: 1.7 MG/DL (ref 2.7–4.5)
PLATELET # BLD AUTO: 279 K/UL (ref 150–350)
PLATELET BLD QL SMEAR: ABNORMAL
PMV BLD AUTO: 9.5 FL (ref 9.2–12.9)
POCT GLUCOSE: 161 MG/DL (ref 70–110)
POCT GLUCOSE: 194 MG/DL (ref 70–110)
POCT GLUCOSE: 251 MG/DL (ref 70–110)
POCT GLUCOSE: 285 MG/DL (ref 70–110)
POCT GLUCOSE: 311 MG/DL (ref 70–110)
POLYCHROMASIA BLD QL SMEAR: ABNORMAL
POTASSIUM SERPL-SCNC: 3.3 MMOL/L (ref 3.5–5.1)
RBC # BLD AUTO: 3.92 M/UL (ref 4.6–6.2)
S PNEUM AG UR QL: DETECTED
SODIUM SERPL-SCNC: 134 MMOL/L (ref 136–145)
TOXIC GRANULES BLD QL SMEAR: PRESENT
VANCOMYCIN TROUGH SERPL-MCNC: 9.8 UG/ML (ref 10–22)
WBC # BLD AUTO: 5.94 K/UL (ref 3.9–12.7)

## 2020-02-26 PROCEDURE — 25000003 PHARM REV CODE 250: Performed by: HOSPITALIST

## 2020-02-26 PROCEDURE — 63600175 PHARM REV CODE 636 W HCPCS: Performed by: HOSPITALIST

## 2020-02-26 PROCEDURE — 80048 BASIC METABOLIC PNL TOTAL CA: CPT

## 2020-02-26 PROCEDURE — A4216 STERILE WATER/SALINE, 10 ML: HCPCS | Performed by: HOSPITALIST

## 2020-02-26 PROCEDURE — 86803 HEPATITIS C AB TEST: CPT

## 2020-02-26 PROCEDURE — 86704 HEP B CORE ANTIBODY TOTAL: CPT

## 2020-02-26 PROCEDURE — 25000003 PHARM REV CODE 250: Performed by: INTERNAL MEDICINE

## 2020-02-26 PROCEDURE — 85007 BL SMEAR W/DIFF WBC COUNT: CPT

## 2020-02-26 PROCEDURE — 63600175 PHARM REV CODE 636 W HCPCS: Performed by: INTERNAL MEDICINE

## 2020-02-26 PROCEDURE — 85027 COMPLETE CBC AUTOMATED: CPT

## 2020-02-26 PROCEDURE — C9399 UNCLASSIFIED DRUGS OR BIOLOG: HCPCS | Performed by: HOSPITALIST

## 2020-02-26 PROCEDURE — 87340 HEPATITIS B SURFACE AG IA: CPT

## 2020-02-26 PROCEDURE — 99223 1ST HOSP IP/OBS HIGH 75: CPT | Mod: ,,, | Performed by: PODIATRIST

## 2020-02-26 PROCEDURE — 36415 COLL VENOUS BLD VENIPUNCTURE: CPT

## 2020-02-26 PROCEDURE — 84100 ASSAY OF PHOSPHORUS: CPT

## 2020-02-26 PROCEDURE — C1751 CATH, INF, PER/CENT/MIDLINE: HCPCS

## 2020-02-26 PROCEDURE — 11000001 HC ACUTE MED/SURG PRIVATE ROOM

## 2020-02-26 PROCEDURE — 99223 PR INITIAL HOSPITAL CARE,LEVL III: ICD-10-PCS | Mod: ,,, | Performed by: PODIATRIST

## 2020-02-26 PROCEDURE — 86706 HEP B SURFACE ANTIBODY: CPT

## 2020-02-26 PROCEDURE — 86703 HIV-1/HIV-2 1 RESULT ANTBDY: CPT

## 2020-02-26 PROCEDURE — 80202 ASSAY OF VANCOMYCIN: CPT

## 2020-02-26 PROCEDURE — 36569 INSJ PICC 5 YR+ W/O IMAGING: CPT

## 2020-02-26 RX ORDER — SODIUM CHLORIDE 9 MG/ML
INJECTION, SOLUTION INTRAVENOUS CONTINUOUS
Status: DISCONTINUED | OUTPATIENT
Start: 2020-02-26 | End: 2020-02-26

## 2020-02-26 RX ORDER — VANCOMYCIN HCL IN 5 % DEXTROSE 1G/250ML
1000 PLASTIC BAG, INJECTION (ML) INTRAVENOUS
Status: DISCONTINUED | OUTPATIENT
Start: 2020-02-26 | End: 2020-03-07

## 2020-02-26 RX ORDER — SODIUM CHLORIDE 0.9 % (FLUSH) 0.9 %
10 SYRINGE (ML) INJECTION EVERY 6 HOURS
Status: DISCONTINUED | OUTPATIENT
Start: 2020-02-26 | End: 2020-03-03

## 2020-02-26 RX ORDER — SODIUM CHLORIDE 0.9 % (FLUSH) 0.9 %
10 SYRINGE (ML) INJECTION
Status: DISCONTINUED | OUTPATIENT
Start: 2020-02-26 | End: 2020-03-03

## 2020-02-26 RX ADMIN — GUAIFENESIN AND DEXTROMETHORPHAN 10 ML: 100; 10 SYRUP ORAL at 01:02

## 2020-02-26 RX ADMIN — GUAIFENESIN AND DEXTROMETHORPHAN 10 ML: 100; 10 SYRUP ORAL at 07:02

## 2020-02-26 RX ADMIN — IBUPROFEN 400 MG: 400 TABLET, FILM COATED ORAL at 08:02

## 2020-02-26 RX ADMIN — INSULIN ASPART 8 UNITS: 100 INJECTION, SOLUTION INTRAVENOUS; SUBCUTANEOUS at 01:02

## 2020-02-26 RX ADMIN — VANCOMYCIN HYDROCHLORIDE 1000 MG: 1 INJECTION, POWDER, LYOPHILIZED, FOR SOLUTION INTRAVENOUS at 05:02

## 2020-02-26 RX ADMIN — ACETAMINOPHEN 650 MG: 325 TABLET ORAL at 02:02

## 2020-02-26 RX ADMIN — POTASSIUM PHOSPHATE, MONOBASIC 1000 MG: 500 TABLET, SOLUBLE ORAL at 08:02

## 2020-02-26 RX ADMIN — GABAPENTIN 300 MG: 300 CAPSULE ORAL at 08:02

## 2020-02-26 RX ADMIN — Medication 10 ML: at 05:02

## 2020-02-26 RX ADMIN — PANTOPRAZOLE SODIUM 40 MG: 40 TABLET, DELAYED RELEASE ORAL at 08:02

## 2020-02-26 RX ADMIN — INSULIN ASPART 8 UNITS: 100 INJECTION, SOLUTION INTRAVENOUS; SUBCUTANEOUS at 09:02

## 2020-02-26 RX ADMIN — INSULIN ASPART 8 UNITS: 100 INJECTION, SOLUTION INTRAVENOUS; SUBCUTANEOUS at 05:02

## 2020-02-26 RX ADMIN — GUAIFENESIN AND DEXTROMETHORPHAN 10 ML: 100; 10 SYRUP ORAL at 09:02

## 2020-02-26 RX ADMIN — POTASSIUM PHOSPHATE, MONOBASIC 1000 MG: 500 TABLET, SOLUBLE ORAL at 03:02

## 2020-02-26 RX ADMIN — GUAIFENESIN AND DEXTROMETHORPHAN 10 ML: 100; 10 SYRUP ORAL at 03:02

## 2020-02-26 RX ADMIN — TRAMADOL HYDROCHLORIDE 50 MG: 50 TABLET ORAL at 09:02

## 2020-02-26 RX ADMIN — TRAMADOL HYDROCHLORIDE 50 MG: 50 TABLET ORAL at 07:02

## 2020-02-26 RX ADMIN — INSULIN ASPART 6 UNITS: 100 INJECTION, SOLUTION INTRAVENOUS; SUBCUTANEOUS at 05:02

## 2020-02-26 RX ADMIN — GABAPENTIN 300 MG: 300 CAPSULE ORAL at 03:02

## 2020-02-26 RX ADMIN — ATORVASTATIN CALCIUM 40 MG: 40 TABLET, FILM COATED ORAL at 08:02

## 2020-02-26 RX ADMIN — Medication 10 ML: at 12:02

## 2020-02-26 RX ADMIN — INSULIN ASPART 6 UNITS: 100 INJECTION, SOLUTION INTRAVENOUS; SUBCUTANEOUS at 09:02

## 2020-02-26 RX ADMIN — VANCOMYCIN HYDROCHLORIDE 1000 MG: 1 INJECTION, POWDER, LYOPHILIZED, FOR SOLUTION INTRAVENOUS at 04:02

## 2020-02-26 RX ADMIN — INSULIN ASPART 1 UNITS: 100 INJECTION, SOLUTION INTRAVENOUS; SUBCUTANEOUS at 08:02

## 2020-02-26 RX ADMIN — SODIUM CHLORIDE: 0.9 INJECTION, SOLUTION INTRAVENOUS at 02:02

## 2020-02-26 RX ADMIN — ENOXAPARIN SODIUM 40 MG: 100 INJECTION SUBCUTANEOUS at 05:02

## 2020-02-26 RX ADMIN — INSULIN DETEMIR 25 UNITS: 100 INJECTION, SOLUTION SUBCUTANEOUS at 08:02

## 2020-02-26 RX ADMIN — TRAMADOL HYDROCHLORIDE 50 MG: 50 TABLET ORAL at 03:02

## 2020-02-26 RX ADMIN — ACETAMINOPHEN 650 MG: 325 TABLET ORAL at 08:02

## 2020-02-26 RX ADMIN — AZITHROMYCIN MONOHYDRATE 500 MG: 500 INJECTION, POWDER, LYOPHILIZED, FOR SOLUTION INTRAVENOUS at 07:02

## 2020-02-26 RX ADMIN — CEFTRIAXONE SODIUM 2 G: 2 INJECTION, SOLUTION INTRAVENOUS at 03:02

## 2020-02-26 NOTE — NURSING
Pt aaox4, able to walk to bathroom with standby assist. Pt took shower, linen changed. Has PICC line to [facility]. Pt medicated with tramadol/ibuprofen for chest pain. mucinex prn given for cough. Iv abx infusing. Contact precautions maintained.

## 2020-02-26 NOTE — CONSULTS
[facility]  Infectious Disease  Consult Note    Patient Name: Cali Mabry  MRN: [id number]  Admission Date: 2/22/2020  Hospital Length of Stay: 4 days  Attending Physician: Kalyani Alberto MD  Primary Care Provider: Primary Doctor Tiff     Isolation Status: Contact    Patient information was obtained from patient and ER records.      Consults  Assessment/Plan:     MRSA bacteremia     54 year old man with DM, history of hepatitis, pancreatitis, HTN and history of IVDU who presented to  ED with complaints of abdominal pain with nausea and vomiting and myalgias.  Found to be in DKA with BG of 635, RAMYA (creat 1.7), with MRSA bacteremia and MRSA/E coli PNA of RLL.  2D echo negative for vegetation.  Repeat blood cultures 2/23 NGTD.  ID consulted for antibiotic recommendations.  Currently on IV vancomycin (MRSA bacteremia/PNA)  and ceftriaxone ( E Coli PNA).   HIV negative.  HCV positive.       Plan/recommendations:  1.  Continue IV vancomycin (pharmacy to dose.  Maintain trough 15-20).  Anticipated duration of IV vancomycin is 14 days from first negative blood culture - estimated end date 3/8/20  2.  Continue IV ceftriaxone 2 grams q 24 hours for PNA - continue X 7 days for E coli in lungs - estimate end date 2/2920  3.  Complete 5 days of azithromycin - End date 2/26 (today)  4. Weekly cbc, cmp, crp, esr and vancomycin trough and fax results to ID at [phone], attention Adam Rivas NP  5.  Outpatient Hepatology referral for HCV.   6.  Follow up with PCP.  ID follow up as needed.   7.  Will sign off.  Please call or re-consult as needed.     Data reviewed and plan discussed with ID staff  Discussed with Primary Team      Pneumonia due to infectious organism  See assessment/plan above        Thank you.   Please call for any questions or concerns.  ARLENE Mcrae, ANP-C  [phone] pager, [facility] [phone]    Subjective:     Principal Problem: Diabetic ketoacidosis without coma associated with  diabetes mellitus due to underlying condition    HPI: Cali Mabry is a 54 year old man with DM, history of hepatitis, pancreatitis, HTN and history of IVDU who presented to  ED with complaints of abdominal pain with nausea and vomiting and myalgias.  Found to be in DKA with BG of 635, RAMYA (creat 1.7), with MRSA bacteremia and MRSA/E coli PNA of RLL.    2D echo negative for vegetation.  Repeat blood cultures 2/23 NGTD.  ID consulted for antibiotic recommendations.     Past Medical History:   Diagnosis Date    Diabetes mellitus     Hepatitis     HTN (hypertension)     Pancreatitis        Past Surgical History:   Procedure Laterality Date    CARDIAC SURGERY  1999    stent placed. ([facility])    SHOULDER SURGERY  2013    right shoulder, spur removal.       Review of patient's allergies indicates:  No Known Allergies    Medications:  Medications Prior to Admission   Medication Sig    atorvastatin (LIPITOR) 40 MG tablet Take 40 mg by mouth once daily.    gabapentin (NEURONTIN) 300 MG capsule Take 300 mg by mouth.    ibuprofen (ADVIL,MOTRIN) 600 MG tablet Take 1 tablet (600 mg total) by mouth every 6 (six) hours as needed for Pain.    insulin aspart U-100 (NOVOLOG) 100 unit/mL (3 mL) InPn pen Inject 10 Units into the skin.    LANTUS 100 unit/mL injection Inject 15 Units into the skin 2 (two) times daily. (Patient taking differently: Inject 30 Units into the skin once daily. )    naloxone (NARCAN) 1 mg/mL injection 2 mg (1 mg per nostril) by Nasal route as needed for opioid overdose; may repeat in 3 to 5 minutes if not effective. Call 911    naloxone (NARCAN) 4 mg/actuation Spry 4mg by nasal route as needed for opioid overdose; may repeat every 2-3 minutes in alternating nostrils until medical help arrives. Call 911    nicotine (NICODERM CQ) 21 mg/24 hr Place 1 patch onto the skin once daily.    omeprazole (PRILOSEC) 40 MG capsule Take 1 capsule (40 mg total) by mouth once daily.      Antibiotics (From admission, onward)    Start     Stop Route Frequency Ordered    02/26/20 0430  vancomycin in dextrose 5 % 1 gram/250 mL IVPB 1,000 mg      -- IV Every 12 hours (non-standard times) 02/26/20 0405    02/25/20 1600  cefTRIAXone (ROCEPHIN) 2 g in dextrose 5 % 50 mL IVPB      -- IV Every 24 hours (non-standard times) 02/25/20 1511    02/23/20 0315  azithromycin 500 mg in dextrose 5 % 250 mL IVPB (ready to mix system)      02/27 0729 IV Every 24 hours (non-standard times) 02/23/20 0214        Antifungals (From admission, onward)    None        Antivirals (From admission, onward)    None           Immunization History   Administered Date(s) Administered    Tdap 07/21/2014       Family History     Problem Relation (Age of Onset)    Asthma Mother        Social History     Socioeconomic History    Marital status: Single     Spouse name: Not on file    Number of children: Not on file    Years of education: Not on file    Highest education level: Not on file   Occupational History    Not on file   Social Needs    Financial resource strain: Not on file    Food insecurity:     Worry: Not on file     Inability: Not on file    Transportation needs:     Medical: Not on file     Non-medical: Not on file   Tobacco Use    Smoking status: Current Every Day Smoker     Packs/day: 1.00     Years: 20.00     Pack years: 20.00     Types: Cigarettes     Start date: 4/3/1981    Smokeless tobacco: Never Used   Substance and Sexual Activity    Alcohol use: No     Alcohol/week: 0.0 standard drinks    Drug use: Yes     Types: IV, Marijuana, Heroin     Comment: currently, used today, herion    Sexual activity: Yes     Partners: Female   Lifestyle    Physical activity:     Days per week: Not on file     Minutes per session: Not on file    Stress: Not on file   Relationships    Social connections:     Talks on phone: Not on file     Gets together: Not on file     Attends [employer] service: Not on file     Active  member of club or organization: Not on file     Attends meetings of clubs or organizations: Not on file     Relationship status: Not on file   Other Topics Concern    Not on file   Social History Narrative    Not on file     Review of Systems   Constitutional: Positive for activity change. Negative for appetite change, chills, diaphoresis and fever.   HENT: Negative for congestion, dental problem, sore throat and trouble swallowing.    Eyes: Negative.    Respiratory: Positive for cough and wheezing. Negative for apnea, chest tightness and shortness of breath.    Cardiovascular: Positive for chest pain (MSK - left sided chest pain). Negative for leg swelling.   Gastrointestinal: Negative for abdominal pain, constipation, nausea and vomiting.   Genitourinary: Negative for difficulty urinating and dysuria.   Musculoskeletal: Negative for arthralgias, back pain, joint swelling and myalgias.   Skin: Negative for color change and pallor. Wound: right heel.   Neurological: Positive for headaches. Negative for dizziness, facial asymmetry and weakness.   Hematological: Does not bruise/bleed easily.   Psychiatric/Behavioral: Negative for agitation, behavioral problems and confusion.     Objective:     Vital Signs (Most Recent):  Temp: 97.8 °F (36.6 °C) (02/26/20 1534)  Pulse: 94 (02/26/20 1534)  Resp: 18 (02/26/20 1534)  BP: 119/67 (02/26/20 1534)  SpO2: 96 % (02/26/20 1534) Vital Signs (24h Range):  Temp:  [97.8 °F (36.6 °C)-100.5 °F (38.1 °C)] 97.8 °F (36.6 °C)  Pulse:  [] 94  Resp:  [18-20] 18  SpO2:  [93 %-98 %] 96 %  BP: (112-137)/(55-78) 119/67     Weight: 57.4 kg (126 lb 8.7 oz)  Body mass index is 19.24 kg/m².    Estimated Creatinine Clearance: 77.1 mL/min (based on SCr of 0.9 mg/dL).    Physical Exam   Constitutional: He is oriented to person, place, and time. He appears well-developed. No distress.   Thin     HENT:   Head: Normocephalic and atraumatic.   Edentulous     Eyes: Conjunctivae are normal. No  scleral icterus.   Neck: Normal range of motion. Neck supple.   Cardiovascular: Normal rate and regular rhythm.   No murmur heard.  Pulmonary/Chest: Effort normal. No respiratory distress. He has no wheezes.   Bibasilar crackles     Abdominal: Soft. Bowel sounds are normal. There is no tenderness.   Musculoskeletal: He exhibits edema and tenderness (left side chest wall/ribs).   Neurological: He is alert and oriented to person, place, and time.   Skin: Skin is warm and dry. He is not diaphoretic.   Right heel skin red, skin peeling   Psychiatric: He has a normal mood and affect. His behavior is normal.   Vitals reviewed.      Significant Labs:   Blood Culture:   Recent Labs   Lab 02/22/20  0540 02/22/20  0600 02/23/20  1139 02/23/20  1140   LABBLOO No Growth to date  No Growth to date  No Growth to date  No Growth to date  No Growth to date Gram stain edison bottle: Gram positive cocci in clusters resembling Staph   02/22/2020  22:04    Results called to and read back by: BRITT FRAUSTO/[id number]  Gram stain edison bottle: Gram positive cocci in clusters resembling Staph   Positive results previously called  METHICILLIN RESISTANT STAPHYLOCOCCUS AUREUS  Called to Niharika Ibanez- 4W  02/24/2020  07:52  * No Growth to date  No Growth to date  No Growth to date  No Growth to date No Growth to date  No Growth to date  No Growth to date  No Growth to date     CBC:   Recent Labs   Lab 02/25/20  0814 02/26/20  0328   WBC 5.15 5.94   HGB 10.5* 10.0*   HCT 32.0* 30.6*    279     CMP:   Recent Labs   Lab 02/25/20  0814 02/26/20  0328   * 134*   K 3.3* 3.3*    104   CO2 21* 21*   * 211*   BUN 17 15   CREATININE 0.8 0.9   CALCIUM 7.7* 7.8*   ANIONGAP 8 9   EGFRNONAA >60 >60     Respiratory Culture:   Recent Labs   Lab 02/23/20  0200   GSRESP <10 epithelial cells per low power field.  Moderate WBC's  Moderate Gram positive cocci in clusters  Few Gram positive rods   RESPIRATORYC ESCHERICHIA  COLI  Many  *  METHICILLIN RESISTANT STAPHYLOCOCCUS AUREUS  Many  *       Significant Imaging: I have reviewed all pertinent imaging results/findings within the past 24 hours.

## 2020-02-26 NOTE — NURSING
Pt aaox3, refused to get out of bed. Agreed to take ordered schedule/SSI insulin with constant encouragement. Has productive cough, with brownish sputum, mucinex given once. Iv abx given. Tramadol/ibuprofen given for chest pain. Refused kathleen hose. Call light w/i reach.

## 2020-02-26 NOTE — PROGRESS NOTES
[facility] Hospital Medicine  Progress Note    Patient Name: Cali Mabry  MRN: [id number]  Patient Class: IP- Inpatient   Admission Date: 2/22/2020  Length of Stay: 4 days  Attending Physician: Kalyani Alberto MD  Primary Care Provider: Primary Doctor No        Subjective:     Principal Problem:Diabetic ketoacidosis without coma associated with diabetes mellitus due to underlying condition        HPI:  54-year-old male past medical history diabetes, hepatitis, hypertension history of polysubstance abuse, pancreatitis presents with abdominal pain for the last 2 days with nausea and vomiting home, and worsening body aches over the last day.  Patient reports he has not had his insulin in some time and reports his glucose is very high.  He states using IV heroin last use few days ago reportedly.  He reports coughing significant amount denies any phlegm production, denies any blood in his sputum, or any chest pain. Denies any blood in her stool, reports normal last bowel movement, reports he is urinating as normal and reports he is extremely thirsty and hungry.  Denies any fevers/chills, no extremity weakness/numbness/tingling.he is in DKA with blood sugar of 635,AG of 26 and CO 2 of 6,he has benen started on DKA protocol,RAMYA with CRT of 1.7,chest  Ray show also sign of pneumonia,patient has been started on broad spectrum IV Abx afterr bload culture is done,    Overview/Hospital Course:  54-year-old male past medical history diabetes, hepatitis, hypertension history of polysubstance abuse,IV heroin,chronic  pancreatitis presents with abdominal pain for the last 2 days with nausea and vomiting home, and worsening body aches over the last day.  Patient reports he has not had his insulin in some time and reports his glucose is very high.  He states using IV heroin last use few days ago reportedly.  He reports coughing significant amount denies any phlegm production, denies any blood in his  sputum, or any chest pain. Denies any blood in her stool, reports normal last bowel movement, reports he is urinating as normal and reports he is extremely thirsty and hungry.  Denies any fevers/chills, no extremity weakness/numbness/tingling.he is in DKA with blood sugar of 635,AG of 26 and CO 2 of 6,he has been  started on DKA protocol,RAMYA with CRT of 1.7,chest  ray show also sign of pneumonia,patient has been started on broad spectrum IV Abx afterr bload culture is done,  AG is closed,started on SQ insulin,started on diet.  Has bacteremia,staph aureus,likely from IV heroin use,alreday on Vanc.repeated blood culture,Echo to R/Oed out  endocarditis.  On Zosyn and azithromycin for pneumonia.  Repeat blood culture no growth,consulted SW for LTAC placement,he is IV drug user,can not go home.reperat blood culture show growth,will place picc line.    Past Medical History:   Diagnosis Date    Diabetes mellitus     Hepatitis     HTN (hypertension)     Pancreatitis        Past Surgical History:   Procedure Laterality Date    CARDIAC SURGERY  1999    stent placed. ([facility])    SHOULDER SURGERY  2013    right shoulder, spur removal.       Review of patient's allergies indicates:  No Known Allergies    No current facility-administered medications on file prior to encounter.      Current Outpatient Medications on File Prior to Encounter   Medication Sig    atorvastatin (LIPITOR) 40 MG tablet Take 40 mg by mouth once daily.    gabapentin (NEURONTIN) 300 MG capsule Take 300 mg by mouth.    ibuprofen (ADVIL,MOTRIN) 600 MG tablet Take 1 tablet (600 mg total) by mouth every 6 (six) hours as needed for Pain.    insulin aspart U-100 (NOVOLOG) 100 unit/mL (3 mL) InPn pen Inject 10 Units into the skin.    LANTUS 100 unit/mL injection Inject 15 Units into the skin 2 (two) times daily. (Patient taking differently: Inject 30 Units into the skin once daily. )    naloxone (NARCAN) 1 mg/mL injection 2 mg (1 mg per  nostril) by Nasal route as needed for opioid overdose; may repeat in 3 to 5 minutes if not effective. Call 911    naloxone (NARCAN) 4 mg/actuation Spry 4mg by nasal route as needed for opioid overdose; may repeat every 2-3 minutes in alternating nostrils until medical help arrives. Call 911    nicotine (NICODERM CQ) 21 mg/24 hr Place 1 patch onto the skin once daily.    omeprazole (PRILOSEC) 40 MG capsule Take 1 capsule (40 mg total) by mouth once daily.     Family History     Problem Relation (Age of Onset)    Asthma Mother        Tobacco Use    Smoking status: Current Every Day Smoker     Packs/day: 1.00     Years: 20.00     Pack years: 20.00     Types: Cigarettes     Start date: 4/3/1981    Smokeless tobacco: Never Used   Substance and Sexual Activity    Alcohol use: No     Alcohol/week: 0.0 standard drinks    Drug use: Yes     Types: IV, Marijuana, Heroin     Comment: currently, used today, herion    Sexual activity: Yes     Partners: Female     Review of Systems   Constitutional: Positive for activity change and appetite change.   HENT: Negative for congestion and dental problem.    Eyes: Negative for discharge and itching.   Respiratory: Positive for cough. Negative for apnea and chest tightness.    Cardiovascular: Negative for chest pain and leg swelling.   Gastrointestinal: Negative for abdominal pain, nausea and vomiting.   Endocrine: Negative for cold intolerance.   Genitourinary: Negative for difficulty urinating and dysuria.   Musculoskeletal: Negative for arthralgias and back pain.   Skin: Negative for color change and pallor.   Allergic/Immunologic: Negative for environmental allergies and food allergies.   Neurological: Negative for dizziness and facial asymmetry.   Hematological: Does not bruise/bleed easily.   Psychiatric/Behavioral: Negative for agitation.     Objective:     Vital Signs (Most Recent):  Temp: 98 °F (36.7 °C) (02/26/20 0824)  Pulse: 107 (02/26/20 0824)  Resp: 18 (02/26/20  0824)  BP: 129/68 (02/26/20 0824)  SpO2: 96 % (02/26/20 0824) Vital Signs (24h Range):  Temp:  [98 °F (36.7 °C)-100.5 °F (38.1 °C)] 98 °F (36.7 °C)  Pulse:  [] 107  Resp:  [18-20] 18  SpO2:  [93 %-98 %] 96 %  BP: (112-137)/(55-78) 129/68     Weight: 57.4 kg (126 lb 8.7 oz)  Body mass index is 19.24 kg/m².    Physical Exam   Constitutional: He is oriented to person, place, and time. No distress.   HENT:   Head: Atraumatic.   Eyes: Pupils are equal, round, and reactive to light. EOM are normal.   Neck: Normal range of motion. Neck supple.   Cardiovascular: Normal rate and regular rhythm.   Pulmonary/Chest: Effort normal.   Abdominal: Soft. He exhibits no distension.   Musculoskeletal: Normal range of motion. He exhibits no edema or deformity.   Neurological: He is oriented to person, place, and time. No cranial nerve deficit. Coordination normal.   Skin: Skin is warm and dry.   Psychiatric: He has a normal mood and affect. His behavior is normal.         CRANIAL NERVES     CN III, IV, VI   Pupils are equal, round, and reactive to light.  Extraocular motions are normal.        Significant Labs:   CBC:   Recent Labs   Lab 02/25/20  0814 02/26/20  0328   WBC 5.15 5.94   HGB 10.5* 10.0*   HCT 32.0* 30.6*    279     CMP:   Recent Labs   Lab 02/25/20  0814 02/26/20  0328   * 134*   K 3.3* 3.3*    104   CO2 21* 21*   * 211*   BUN 17 15   CREATININE 0.8 0.9   CALCIUM 7.7* 7.8*   ANIONGAP 8 9   EGFRNONAA >60 >60       Significant Imaging: reviewed.      Assessment/Plan:      * Diabetic ketoacidosis without coma associated with diabetes mellitus due to underlying condition  DKA resolved  Now with hypoglycemia  Has received D50, patient eating well  If hypoglycemia persist, will start D51/2NS      MRSA bacteremia  Blood cultures (+) MRSA  Likely from IV heroin use  Continue Vancomycin  Blood cultures repeated  Echo performed: WNL  Repeat blood culture no growth,consulted SW for LTAC placement,he  is IV drug user,can not go home.reperat blood culture show growth,will place picc line.      Pneumonia due to infectious organism  Per clinical presentation and imaging,will continue with IV Abx.      Chronic hepatitis C  Will monitor.      Intravenous drug abuse  Consult on IV heroin cessation use  Patient states that he quit 3-4 weeks ago      Hyperlipidemia  On statin.      Pancreatitis, chronic  Stable at this  time.      Essential hypertension  Stable at this time with no meds  Will monitor throughout hospital course    Type 2 diabetes mellitus, uncontrolled  Currently hypoglycemia  Will monitor blood glucose q.a.c. and HS  Will adjust regimen based on blood glucose trends        VTE Risk Mitigation (From admission, onward)         Ordered     enoxaparin injection 40 mg  Daily      02/22/20 1157     IP VTE HIGH RISK PATIENT  Once      02/22/20 1157     Place DAWIT hose  Until discontinued      02/22/20 1157                      Kalyani Alberto MD  Department of Hospital Medicine   [facility]

## 2020-02-26 NOTE — SUBJECTIVE & OBJECTIVE
Past Medical History:   Diagnosis Date    Diabetes mellitus     Hepatitis     HTN (hypertension)     Pancreatitis        Past Surgical History:   Procedure Laterality Date    CARDIAC SURGERY  1999    stent placed. ([facility])    SHOULDER SURGERY  2013    right shoulder, spur removal.       Review of patient's allergies indicates:  No Known Allergies    Medications:  Medications Prior to Admission   Medication Sig    atorvastatin (LIPITOR) 40 MG tablet Take 40 mg by mouth once daily.    gabapentin (NEURONTIN) 300 MG capsule Take 300 mg by mouth.    ibuprofen (ADVIL,MOTRIN) 600 MG tablet Take 1 tablet (600 mg total) by mouth every 6 (six) hours as needed for Pain.    insulin aspart U-100 (NOVOLOG) 100 unit/mL (3 mL) InPn pen Inject 10 Units into the skin.    LANTUS 100 unit/mL injection Inject 15 Units into the skin 2 (two) times daily. (Patient taking differently: Inject 30 Units into the skin once daily. )    naloxone (NARCAN) 1 mg/mL injection 2 mg (1 mg per nostril) by Nasal route as needed for opioid overdose; may repeat in 3 to 5 minutes if not effective. Call 911    naloxone (NARCAN) 4 mg/actuation Spry 4mg by nasal route as needed for opioid overdose; may repeat every 2-3 minutes in alternating nostrils until medical help arrives. Call 911    nicotine (NICODERM CQ) 21 mg/24 hr Place 1 patch onto the skin once daily.    omeprazole (PRILOSEC) 40 MG capsule Take 1 capsule (40 mg total) by mouth once daily.     Antibiotics (From admission, onward)    Start     Stop Route Frequency Ordered    02/26/20 0430  vancomycin in dextrose 5 % 1 gram/250 mL IVPB 1,000 mg      -- IV Every 12 hours (non-standard times) 02/26/20 0405    02/25/20 1600  cefTRIAXone (ROCEPHIN) 2 g in dextrose 5 % 50 mL IVPB      -- IV Every 24 hours (non-standard times) 02/25/20 1511    02/23/20 0315  azithromycin 500 mg in dextrose 5 % 250 mL IVPB (ready to mix system)      02/27 0729 IV Every 24 hours (non-standard times)  02/23/20 0214        Antifungals (From admission, onward)    None        Antivirals (From admission, onward)    None           Immunization History   Administered Date(s) Administered    Tdap 07/21/2014       Family History     Problem Relation (Age of Onset)    Asthma Mother        Social History     Socioeconomic History    Marital status: Single     Spouse name: Not on file    Number of children: Not on file    Years of education: Not on file    Highest education level: Not on file   Occupational History    Not on file   Social Needs    Financial resource strain: Not on file    Food insecurity:     Worry: Not on file     Inability: Not on file    Transportation needs:     Medical: Not on file     Non-medical: Not on file   Tobacco Use    Smoking status: Current Every Day Smoker     Packs/day: 1.00     Years: 20.00     Pack years: 20.00     Types: Cigarettes     Start date: 4/3/1981    Smokeless tobacco: Never Used   Substance and Sexual Activity    Alcohol use: No     Alcohol/week: 0.0 standard drinks    Drug use: Yes     Types: IV, Marijuana, Heroin     Comment: currently, used today, herion    Sexual activity: Yes     Partners: Female   Lifestyle    Physical activity:     Days per week: Not on file     Minutes per session: Not on file    Stress: Not on file   Relationships    Social connections:     Talks on phone: Not on file     Gets together: Not on file     Attends [employer] service: Not on file     Active member of club or organization: Not on file     Attends meetings of clubs or organizations: Not on file     Relationship status: Not on file   Other Topics Concern    Not on file   Social History Narrative    Not on file     Review of Systems   Constitutional: Positive for activity change. Negative for appetite change, chills, diaphoresis and fever.   HENT: Negative for congestion, dental problem, sore throat and trouble swallowing.    Eyes: Negative.    Respiratory: Positive for cough  and wheezing. Negative for apnea, chest tightness and shortness of breath.    Cardiovascular: Positive for chest pain (MSK - left sided chest pain). Negative for leg swelling.   Gastrointestinal: Negative for abdominal pain, constipation, nausea and vomiting.   Genitourinary: Negative for difficulty urinating and dysuria.   Musculoskeletal: Negative for arthralgias, back pain, joint swelling and myalgias.   Skin: Negative for color change and pallor. Wound: right heel.   Neurological: Positive for numbness (bilateral feet - peipheral neuropathy) and headaches. Negative for dizziness, facial asymmetry and weakness.   Hematological: Does not bruise/bleed easily.   Psychiatric/Behavioral: Negative for agitation, behavioral problems and confusion.     Objective:     Vital Signs (Most Recent):  Temp: 97.8 °F (36.6 °C) (02/26/20 1534)  Pulse: 94 (02/26/20 1534)  Resp: 18 (02/26/20 1534)  BP: 119/67 (02/26/20 1534)  SpO2: 96 % (02/26/20 1534) Vital Signs (24h Range):  Temp:  [97.8 °F (36.6 °C)-100.5 °F (38.1 °C)] 97.8 °F (36.6 °C)  Pulse:  [] 94  Resp:  [18-20] 18  SpO2:  [93 %-98 %] 96 %  BP: (112-137)/(55-78) 119/67     Weight: 57.4 kg (126 lb 8.7 oz)  Body mass index is 19.24 kg/m².    Estimated Creatinine Clearance: 77.1 mL/min (based on SCr of 0.9 mg/dL).    Physical Exam   Constitutional: He is oriented to person, place, and time. He appears well-developed. No distress.   Thin     HENT:   Head: Normocephalic and atraumatic.   Edentulous     Eyes: Conjunctivae are normal. No scleral icterus.   Neck: Normal range of motion. Neck supple.   Cardiovascular: Normal rate and regular rhythm.   No murmur heard.  Pulmonary/Chest: Effort normal. No respiratory distress. He has no wheezes.   Bibasilar crackles     Abdominal: Soft. Bowel sounds are normal. There is no tenderness.   Musculoskeletal: He exhibits edema and tenderness (left side chest wall/ribs).   Neurological: He is alert and oriented to person, place, and  time.   Skin: Skin is warm and dry. He is not diaphoretic.   Tissue injury right heel   Psychiatric: He has a normal mood and affect. His behavior is normal.   Vitals reviewed.      Significant Labs:   Blood Culture:   Recent Labs   Lab 02/22/20  0540 02/22/20  0600 02/23/20  1139 02/23/20  1140   LABBLOO No Growth to date  No Growth to date  No Growth to date  No Growth to date  No Growth to date Gram stain edison bottle: Gram positive cocci in clusters resembling Staph   02/22/2020  22:04    Results called to and read back by: BRITT FRAUSTO/[id number]  Gram stain edison bottle: Gram positive cocci in clusters resembling Staph   Positive results previously called  METHICILLIN RESISTANT STAPHYLOCOCCUS AUREUS  Called to Niharika Ibanez- 4W  02/24/2020  07:52  * No Growth to date  No Growth to date  No Growth to date  No Growth to date No Growth to date  No Growth to date  No Growth to date  No Growth to date     CBC:   Recent Labs   Lab 02/25/20  0814 02/26/20  0328   WBC 5.15 5.94   HGB 10.5* 10.0*   HCT 32.0* 30.6*    279     CMP:   Recent Labs   Lab 02/25/20  0814 02/26/20  0328   * 134*   K 3.3* 3.3*    104   CO2 21* 21*   * 211*   BUN 17 15   CREATININE 0.8 0.9   CALCIUM 7.7* 7.8*   ANIONGAP 8 9   EGFRNONAA >60 >60     Respiratory Culture:   Recent Labs   Lab 02/23/20  0200   GSRESP <10 epithelial cells per low power field.  Moderate WBC's  Moderate Gram positive cocci in clusters  Few Gram positive rods   RESPIRATORYC ESCHERICHIA COLI  Many  *  METHICILLIN RESISTANT STAPHYLOCOCCUS AUREUS  Many  *       Significant Imaging: I have reviewed all pertinent imaging results/findings within the past 24 hours.

## 2020-02-26 NOTE — SUBJECTIVE & OBJECTIVE
Past Medical History:   Diagnosis Date    Diabetes mellitus     Hepatitis     HTN (hypertension)     Pancreatitis        Past Surgical History:   Procedure Laterality Date    CARDIAC SURGERY  1999    stent placed. ([facility])    SHOULDER SURGERY  2013    right shoulder, spur removal.       Review of patient's allergies indicates:  No Known Allergies    No current facility-administered medications on file prior to encounter.      Current Outpatient Medications on File Prior to Encounter   Medication Sig    atorvastatin (LIPITOR) 40 MG tablet Take 40 mg by mouth once daily.    gabapentin (NEURONTIN) 300 MG capsule Take 300 mg by mouth.    ibuprofen (ADVIL,MOTRIN) 600 MG tablet Take 1 tablet (600 mg total) by mouth every 6 (six) hours as needed for Pain.    insulin aspart U-100 (NOVOLOG) 100 unit/mL (3 mL) InPn pen Inject 10 Units into the skin.    LANTUS 100 unit/mL injection Inject 15 Units into the skin 2 (two) times daily. (Patient taking differently: Inject 30 Units into the skin once daily. )    naloxone (NARCAN) 1 mg/mL injection 2 mg (1 mg per nostril) by Nasal route as needed for opioid overdose; may repeat in 3 to 5 minutes if not effective. Call 911    naloxone (NARCAN) 4 mg/actuation Spry 4mg by nasal route as needed for opioid overdose; may repeat every 2-3 minutes in alternating nostrils until medical help arrives. Call 911    nicotine (NICODERM CQ) 21 mg/24 hr Place 1 patch onto the skin once daily.    omeprazole (PRILOSEC) 40 MG capsule Take 1 capsule (40 mg total) by mouth once daily.     Family History     Problem Relation (Age of Onset)    Asthma Mother        Tobacco Use    Smoking status: Current Every Day Smoker     Packs/day: 1.00     Years: 20.00     Pack years: 20.00     Types: Cigarettes     Start date: 4/3/1981    Smokeless tobacco: Never Used   Substance and Sexual Activity    Alcohol use: No     Alcohol/week: 0.0 standard drinks    Drug use: Yes     Types: IV,  Marijuana, Heroin     Comment: currently, used today, herion    Sexual activity: Yes     Partners: Female     Review of Systems   Constitutional: Positive for activity change and appetite change.   HENT: Negative for congestion and dental problem.    Eyes: Negative for discharge and itching.   Respiratory: Positive for cough. Negative for apnea and chest tightness.    Cardiovascular: Negative for chest pain and leg swelling.   Gastrointestinal: Negative for abdominal pain, nausea and vomiting.   Endocrine: Negative for cold intolerance.   Genitourinary: Negative for difficulty urinating and dysuria.   Musculoskeletal: Negative for arthralgias and back pain.   Skin: Negative for color change and pallor.   Allergic/Immunologic: Negative for environmental allergies and food allergies.   Neurological: Negative for dizziness and facial asymmetry.   Hematological: Does not bruise/bleed easily.   Psychiatric/Behavioral: Negative for agitation.     Objective:     Vital Signs (Most Recent):  Temp: 98 °F (36.7 °C) (02/26/20 0824)  Pulse: 107 (02/26/20 0824)  Resp: 18 (02/26/20 0824)  BP: 129/68 (02/26/20 0824)  SpO2: 96 % (02/26/20 0824) Vital Signs (24h Range):  Temp:  [98 °F (36.7 °C)-100.5 °F (38.1 °C)] 98 °F (36.7 °C)  Pulse:  [] 107  Resp:  [18-20] 18  SpO2:  [93 %-98 %] 96 %  BP: (112-137)/(55-78) 129/68     Weight: 57.4 kg (126 lb 8.7 oz)  Body mass index is 19.24 kg/m².    Physical Exam   Constitutional: He is oriented to person, place, and time. No distress.   HENT:   Head: Atraumatic.   Eyes: Pupils are equal, round, and reactive to light. EOM are normal.   Neck: Normal range of motion. Neck supple.   Cardiovascular: Normal rate and regular rhythm.   Pulmonary/Chest: Effort normal.   Abdominal: Soft. He exhibits no distension.   Musculoskeletal: Normal range of motion. He exhibits no edema or deformity.   Neurological: He is oriented to person, place, and time. No cranial nerve deficit. Coordination normal.    Skin: Skin is warm and dry.   Psychiatric: He has a normal mood and affect. His behavior is normal.         CRANIAL NERVES     CN III, IV, VI   Pupils are equal, round, and reactive to light.  Extraocular motions are normal.        Significant Labs:   CBC:   Recent Labs   Lab 02/25/20  0814 02/26/20  0328   WBC 5.15 5.94   HGB 10.5* 10.0*   HCT 32.0* 30.6*    279     CMP:   Recent Labs   Lab 02/25/20  0814 02/26/20  0328   * 134*   K 3.3* 3.3*    104   CO2 21* 21*   * 211*   BUN 17 15   CREATININE 0.8 0.9   CALCIUM 7.7* 7.8*   ANIONGAP 8 9   EGFRNONAA >60 >60       Significant Imaging: reviewed.

## 2020-02-26 NOTE — ASSESSMENT & PLAN NOTE
54 year old man with DM, history of hepatitis, pancreatitis, HTN and history of IVDU who presented to  ED with complaints of abdominal pain with nausea and vomiting and myalgias.  Found to be in DKA with BG of 635, RAMYA (creat 1.7), with MRSA bacteremia and MRSA/E coli PNA of RLL.  2D echo negative for vegetation.  Repeat blood cultures 2/23 NGTD.  ID consulted for antibiotic recommendations.  Currently on IV vancomycin (MRSA bacteremia/PNA)  and ceftriaxone ( E Coli PNA).   HIV negative.  HCV positive.       Plan/recommendations:  1.  Continue IV vancomycin (pharmacy to dose.  Maintain trough 15-20).  Anticipated duration of IV vancomycin is 14 days from first negative blood culture - estimated end date 3/8/20  2.  Continue IV ceftriaxone 2 grams q 24 hours for PNA - continue X 7 days for E coli in lungs - estimate end date 2/2920  3.  Complete 5 days of azithromycin - End date 2/26 (today)  4. Weekly cbc, cmp, crp, esr and vancomycin trough and fax results to ID at [phone], attention Adam Rivas NP  5.  Recommend Podiatry evaluation - wound right heel  6.  Outpatient Hepatology referral for HCV.   7.  Follow up with PCP.  ID follow up as needed.   8.  Will sign off.  Please call or re-consult as needed.     Data reviewed and plan discussed with ID staff  Discussed with Primary Team

## 2020-02-26 NOTE — HPI
Cali Mabry is a 54 year old man with DM, history of hepatitis, pancreatitis, HTN and history of IVDU who presented to  ED with complaints of abdominal pain with nausea and vomiting and myalgias.  Found to be in DKA with BG of 635, RAMYA (creat 1.7), with MRSA bacteremia and MRSA/E coli PNA of RLL.    2D echo negative for vegetation.  Repeat blood cultures 2/23 NGTD.  ID consulted for antibiotic recommendations.

## 2020-02-26 NOTE — PLAN OF CARE
02/26/20 1255   Post-Acute Status   Post-Acute Authorization Placement   Post-Acute Placement Status Pending Payor Review   Other Status No Post-Acute Service Needs   Discharge Delays None known at this time     SW to pt's room to discuss d/c planning. Pt explained he will d/c to LTAC at d/c and would like [facility]. SW faxed updated Mar to [facility] via [facility]. LINN spoke with Ana ([facility]), she confirmed she has accepted pt and will submit for auth today.     2:58PM  According to Ana ([facility]), she can no longer accept pt due to not enough ICU days. LINN faxed pt's clinicals to [facility]. SW waiting to determine if he will be accepted for services.

## 2020-02-26 NOTE — PROGRESS NOTES
[facility]  Infectious Disease  Progress Note    Patient Name: Cali Mabry  MRN: [id number]  Admission Date: 2/22/2020  Length of Stay: 4 days  Attending Physician: Kalyani Alberto MD  Primary Care Provider: Primary Doctor No    Isolation Status: Contact  Assessment/Plan:      MRSA bacteremia     54 year old man with DM, history of hepatitis, pancreatitis, HTN and history of IVDU who presented to  ED with complaints of abdominal pain with nausea and vomiting and myalgias.  Found to be in DKA with BG of 635, RAMYA (creat 1.7), with MRSA bacteremia and MRSA/E coli PNA of RLL.  2D echo negative for vegetation.  Repeat blood cultures 2/23 NGTD.  ID consulted for antibiotic recommendations.  Currently on IV vancomycin (MRSA bacteremia/PNA)  and ceftriaxone ( E Coli PNA).   HIV negative.  HCV positive.       Plan/recommendations:  1.  Continue IV vancomycin (pharmacy to dose.  Maintain trough 15-20).  Anticipated duration of IV vancomycin is 14 days from first negative blood culture - estimated end date 3/8/20  2.  Continue IV ceftriaxone 2 grams q 24 hours for PNA - continue X 7 days for E coli in lungs - estimate end date 2/2920  3.  Complete 5 days of azithromycin - End date 2/26 (today)  4. Weekly cbc, cmp, crp, esr and vancomycin trough and fax results to ID at [phone], attention Adam Rivas NP  5.  Recommend Podiatry evaluation - wound right heel  6.  Outpatient Hepatology referral for HCV.   7.  Follow up with PCP.  ID follow up as needed.   8.  Will sign off.  Please call or re-consult as needed.     Data reviewed and plan discussed with ID staff  Discussed with Primary Team      Pneumonia due to infectious organism  See assessment/plan above      Thank you.   Please call for any questions or concerns.  ARLENE Mcrae, ANP-C  [phone] pager, [place] [phone]    Subjective:     Principal Problem:Diabetic ketoacidosis without coma associated with diabetes mellitus due to underlying  condition    HPI: Cali Mabry is a 54 year old man with DM, history of hepatitis, pancreatitis, HTN and history of IVDU who presented to  ED with complaints of abdominal pain with nausea and vomiting and myalgias.  Found to be in DKA with BG of 635, RAMYA (creat 1.7), with MRSA bacteremia and MRSA/E coli PNA of RLL.    2D echo negative for vegetation.  Repeat blood cultures 2/23 NGTD.  ID consulted for antibiotic recommendations.   Past Medical History:   Diagnosis Date    Diabetes mellitus     Hepatitis     HTN (hypertension)     Pancreatitis        Past Surgical History:   Procedure Laterality Date    CARDIAC SURGERY  1999    stent placed. ([facility])    SHOULDER SURGERY  2013    right shoulder, spur removal.       Review of patient's allergies indicates:  No Known Allergies    Medications:  Medications Prior to Admission   Medication Sig    atorvastatin (LIPITOR) 40 MG tablet Take 40 mg by mouth once daily.    gabapentin (NEURONTIN) 300 MG capsule Take 300 mg by mouth.    ibuprofen (ADVIL,MOTRIN) 600 MG tablet Take 1 tablet (600 mg total) by mouth every 6 (six) hours as needed for Pain.    insulin aspart U-100 (NOVOLOG) 100 unit/mL (3 mL) InPn pen Inject 10 Units into the skin.    LANTUS 100 unit/mL injection Inject 15 Units into the skin 2 (two) times daily. (Patient taking differently: Inject 30 Units into the skin once daily. )    naloxone (NARCAN) 1 mg/mL injection 2 mg (1 mg per nostril) by Nasal route as needed for opioid overdose; may repeat in 3 to 5 minutes if not effective. Call 911    naloxone (NARCAN) 4 mg/actuation Spry 4mg by nasal route as needed for opioid overdose; may repeat every 2-3 minutes in alternating nostrils until medical help arrives. Call 911    nicotine (NICODERM CQ) 21 mg/24 hr Place 1 patch onto the skin once daily.    omeprazole (PRILOSEC) 40 MG capsule Take 1 capsule (40 mg total) by mouth once daily.     Antibiotics (From admission, onward)    Start      Stop Route Frequency Ordered    02/26/20 0430  vancomycin in dextrose 5 % 1 gram/250 mL IVPB 1,000 mg      -- IV Every 12 hours (non-standard times) 02/26/20 0405    02/25/20 1600  cefTRIAXone (ROCEPHIN) 2 g in dextrose 5 % 50 mL IVPB      -- IV Every 24 hours (non-standard times) 02/25/20 1511    02/23/20 0315  azithromycin 500 mg in dextrose 5 % 250 mL IVPB (ready to mix system)      02/27 0729 IV Every 24 hours (non-standard times) 02/23/20 0214        Antifungals (From admission, onward)    None        Antivirals (From admission, onward)    None           Immunization History   Administered Date(s) Administered    Tdap 07/21/2014       Family History     Problem Relation (Age of Onset)    Asthma Mother        Social History     Socioeconomic History    Marital status: Single     Spouse name: Not on file    Number of children: Not on file    Years of education: Not on file    Highest education level: Not on file   Occupational History    Not on file   Social Needs    Financial resource strain: Not on file    Food insecurity:     Worry: Not on file     Inability: Not on file    Transportation needs:     Medical: Not on file     Non-medical: Not on file   Tobacco Use    Smoking status: Current Every Day Smoker     Packs/day: 1.00     Years: 20.00     Pack years: 20.00     Types: Cigarettes     Start date: 4/3/1981    Smokeless tobacco: Never Used   Substance and Sexual Activity    Alcohol use: No     Alcohol/week: 0.0 standard drinks    Drug use: Yes     Types: IV, Marijuana, Heroin     Comment: currently, used today, herion    Sexual activity: Yes     Partners: Female   Lifestyle    Physical activity:     Days per week: Not on file     Minutes per session: Not on file    Stress: Not on file   Relationships    Social connections:     Talks on phone: Not on file     Gets together: Not on file     Attends Alevism service: Not on file     Active member of club or organization: Not on file      Attends meetings of clubs or organizations: Not on file     Relationship status: Not on file   Other Topics Concern    Not on file   Social History Narrative    Not on file     Review of Systems   Constitutional: Positive for activity change. Negative for appetite change, chills, diaphoresis and fever.   HENT: Negative for congestion, dental problem, sore throat and trouble swallowing.    Eyes: Negative.    Respiratory: Positive for cough and wheezing. Negative for apnea, chest tightness and shortness of breath.    Cardiovascular: Positive for chest pain (MSK - left sided chest pain). Negative for leg swelling.   Gastrointestinal: Negative for abdominal pain, constipation, nausea and vomiting.   Genitourinary: Negative for difficulty urinating and dysuria.   Musculoskeletal: Negative for arthralgias, back pain, joint swelling and myalgias.   Skin: Negative for color change and pallor. Wound: right heel.   Neurological: Positive for numbness (bilateral feet - peipheral neuropathy) and headaches. Negative for dizziness, facial asymmetry and weakness.   Hematological: Does not bruise/bleed easily.   Psychiatric/Behavioral: Negative for agitation, behavioral problems and confusion.     Objective:     Vital Signs (Most Recent):  Temp: 97.8 °F (36.6 °C) (02/26/20 1534)  Pulse: 94 (02/26/20 1534)  Resp: 18 (02/26/20 1534)  BP: 119/67 (02/26/20 1534)  SpO2: 96 % (02/26/20 1534) Vital Signs (24h Range):  Temp:  [97.8 °F (36.6 °C)-100.5 °F (38.1 °C)] 97.8 °F (36.6 °C)  Pulse:  [] 94  Resp:  [18-20] 18  SpO2:  [93 %-98 %] 96 %  BP: (112-137)/(55-78) 119/67     Weight: 57.4 kg (126 lb 8.7 oz)  Body mass index is 19.24 kg/m².    Estimated Creatinine Clearance: 77.1 mL/min (based on SCr of 0.9 mg/dL).    Physical Exam   Constitutional: He is oriented to person, place, and time. He appears well-developed. No distress.   Thin     HENT:   Head: Normocephalic and atraumatic.   Edentulous     Eyes: Conjunctivae are normal. No  scleral icterus.   Neck: Normal range of motion. Neck supple.   Cardiovascular: Normal rate and regular rhythm.   No murmur heard.  Pulmonary/Chest: Effort normal. No respiratory distress. He has no wheezes.   Bibasilar crackles     Abdominal: Soft. Bowel sounds are normal. There is no tenderness.   Musculoskeletal: He exhibits edema and tenderness (left side chest wall/ribs).   Neurological: He is alert and oriented to person, place, and time.   Skin: Skin is warm and dry. He is not diaphoretic.   Tissue injury right heel   Psychiatric: He has a normal mood and affect. His behavior is normal.   Vitals reviewed.      Significant Labs:   Blood Culture:   Recent Labs   Lab 02/22/20  0540 02/22/20  0600 02/23/20  1139 02/23/20  1140   LABBLOO No Growth to date  No Growth to date  No Growth to date  No Growth to date  No Growth to date Gram stain edison bottle: Gram positive cocci in clusters resembling Staph   02/22/2020  22:04    Results called to and read back by: BRITT FRAUSTO/[id number]  Gram stain edison bottle: Gram positive cocci in clusters resembling Staph   Positive results previously called  METHICILLIN RESISTANT STAPHYLOCOCCUS AUREUS  Called to Niharika Ibanez- 4W  02/24/2020  07:52  * No Growth to date  No Growth to date  No Growth to date  No Growth to date No Growth to date  No Growth to date  No Growth to date  No Growth to date     CBC:   Recent Labs   Lab 02/25/20  0814 02/26/20  0328   WBC 5.15 5.94   HGB 10.5* 10.0*   HCT 32.0* 30.6*    279     CMP:   Recent Labs   Lab 02/25/20  0814 02/26/20  0328   * 134*   K 3.3* 3.3*    104   CO2 21* 21*   * 211*   BUN 17 15   CREATININE 0.8 0.9   CALCIUM 7.7* 7.8*   ANIONGAP 8 9   EGFRNONAA >60 >60     Respiratory Culture:   Recent Labs   Lab 02/23/20  0200   GSRESP <10 epithelial cells per low power field.  Moderate WBC's  Moderate Gram positive cocci in clusters  Few Gram positive rods   RESPIRATORYC ESCHERICHIA COLI  Many  *   METHICILLIN RESISTANT STAPHYLOCOCCUS AUREUS  Many  *       Significant Imaging: I have reviewed all pertinent imaging results/findings within the past 24 hours.

## 2020-02-26 NOTE — NURSING
Patient refusing midnight VS. States he wants to rest. Educated on importance of VS, continues to refuse. Will cont to monitor.

## 2020-02-26 NOTE — PROCEDURES
"Cali Mabry is a 54 y.o. male patient.    Temp: 98 °F (36.7 °C) (02/26/20 0824)  Pulse: 107 (02/26/20 0824)  Resp: 18 (02/26/20 0824)  BP: 129/68 (02/26/20 0824)  SpO2: 96 % (02/26/20 0824)  Weight: 57.4 kg (126 lb 8.7 oz) (02/22/20 2000)  Height: 5' 8" (172.7 cm) (02/22/20 2000)    PICC  Date/Time: 2/26/2020 10:10 AM  Performed by: Samuel Maurice RN  Consent Done: Yes  Time out: Immediately prior to procedure a time out was called to verify the correct patient, procedure, equipment, support staff and site/side marked as required  Indications: med administration  Anesthesia: local infiltration  Local anesthetic: lidocaine 1% without epinephrine  Anesthetic Total (mL): 1  Preparation: skin prepped with ChloraPrep  Skin prep agent dried: skin prep agent completely dried prior to procedure  Sterile barriers: all five maximum sterile barriers used - cap, mask, sterile gown, sterile gloves, and large sterile sheet  Hand hygiene: hand hygiene performed prior to central venous catheter insertion  Location details: right basilic  Catheter type: double lumen  Catheter size: 5 Fr  Catheter Length: 37cm    Ultrasound guidance: yes  Vessel Caliber: medium and large and patent, compressibility normal  Needle advanced into vessel with real time Ultrasound guidance.  Guidewire confirmed in vessel.  Sterile sheath used.  Post-procedure: blood return through all ports, chlorhexidine patch and sterile dressing applied  Estimated blood loss (mL): 0            Samuel Maurice  2/26/2020    "

## 2020-02-26 NOTE — PLAN OF CARE
Problem: Adult Inpatient Plan of Care  Goal: Plan of Care Review  Outcome: Ongoing, Progressing  Goal: Optimal Comfort and Wellbeing  Outcome: Ongoing, Progressing  Intervention: Provide Person-Centered Care  Flowsheets (Taken 2/26/2020 0412)  Trust Relationship/Rapport: care explained; questions answered; questions encouraged; reassurance provided; thoughts/feelings acknowledged     Problem: Diabetes Comorbidity  Goal: Blood Glucose Level Within Desired Range  Outcome: Ongoing, Progressing  Intervention: Maintain Glycemic Control  Flowsheets (Taken 2/26/2020 0412)  Glycemic Management: blood glucose monitoring; supplemental insulin given     Problem: Fall Injury Risk  Goal: Absence of Fall and Fall-Related Injury  Outcome: Ongoing, Progressing  Intervention: Identify and Manage Contributors to Fall Injury Risk  Flowsheets (Taken 2/26/2020 0412)  Self-Care Promotion: independence encouraged; BADL personal objects within reach  Medication Review/Management: medications reviewed  Intervention: Promote Injury-Free Environment  Flowsheets (Taken 2/26/2020 0412)  Safety Promotion/Fall Prevention: assistive device/personal item within reach; bed alarm set; commode/urinal/bedpan at bedside; Fall Risk reviewed with patient/family; Fall Risk signage in place; medications reviewed; nonskid shoes/socks when out of bed; side rails raised x 2; instructed to call staff for mobility  Environmental Safety Modification: assistive device/personal items within reach; clutter free environment maintained; lighting adjusted; room organization consistent; room near unit station     Problem: Infection  Goal: Infection Symptom Resolution  Outcome: Ongoing, Progressing  Intervention: Prevent or Manage Infection  Flowsheets (Taken 2/26/2020 0412)  Fever Reduction/Comfort Measures: lightweight bedding; lightweight clothing  Isolation Precautions: contact precautions maintained

## 2020-02-26 NOTE — ASSESSMENT & PLAN NOTE
Blood cultures (+) MRSA  Likely from IV heroin use  Continue Vancomycin  Blood cultures repeated  Echo performed: WNL  Repeat blood culture no growth,consulted SW for LTAC placement,he is IV drug user,can not go home.reperat blood culture show growth,will place picc line.

## 2020-02-27 LAB
ANION GAP SERPL CALC-SCNC: 6 MMOL/L (ref 8–16)
ANISOCYTOSIS BLD QL SMEAR: SLIGHT
BACTERIA BLD CULT: NORMAL
BASOPHILS # BLD AUTO: ABNORMAL K/UL (ref 0–0.2)
BASOPHILS NFR BLD: 0 % (ref 0–1.9)
BUN SERPL-MCNC: 12 MG/DL (ref 6–20)
CALCIUM SERPL-MCNC: 7.8 MG/DL (ref 8.7–10.5)
CHLORIDE SERPL-SCNC: 101 MMOL/L (ref 95–110)
CO2 SERPL-SCNC: 29 MMOL/L (ref 23–29)
CREAT SERPL-MCNC: 0.8 MG/DL (ref 0.5–1.4)
DIFFERENTIAL METHOD: ABNORMAL
EOSINOPHIL # BLD AUTO: ABNORMAL K/UL (ref 0–0.5)
EOSINOPHIL NFR BLD: 0 % (ref 0–8)
ERYTHROCYTE [DISTWIDTH] IN BLOOD BY AUTOMATED COUNT: 15 % (ref 11.5–14.5)
EST. GFR  (AFRICAN AMERICAN): >60 ML/MIN/1.73 M^2
EST. GFR  (NON AFRICAN AMERICAN): >60 ML/MIN/1.73 M^2
GLUCOSE SERPL-MCNC: 56 MG/DL (ref 70–110)
GLUCOSE SERPL-MCNC: 603 MG/DL (ref 70–110)
HCT VFR BLD AUTO: 27.5 % (ref 40–54)
HGB BLD-MCNC: 9 G/DL (ref 14–18)
HYPOCHROMIA BLD QL SMEAR: ABNORMAL
IMM GRANULOCYTES # BLD AUTO: ABNORMAL K/UL (ref 0–0.04)
IMM GRANULOCYTES NFR BLD AUTO: ABNORMAL % (ref 0–0.5)
LYMPHOCYTES # BLD AUTO: ABNORMAL K/UL (ref 1–4.8)
LYMPHOCYTES NFR BLD: 24 % (ref 18–48)
MCH RBC QN AUTO: 26.2 PG (ref 27–31)
MCHC RBC AUTO-ENTMCNC: 32.7 G/DL (ref 32–36)
MCV RBC AUTO: 80 FL (ref 82–98)
METAMYELOCYTES NFR BLD MANUAL: 3 %
MONOCYTES # BLD AUTO: ABNORMAL K/UL (ref 0.3–1)
MONOCYTES NFR BLD: 9 % (ref 4–15)
MYELOCYTES NFR BLD MANUAL: 3 %
NEUTROPHILS NFR BLD: 55 % (ref 38–73)
NEUTS BAND NFR BLD MANUAL: 6 %
NRBC BLD-RTO: 0 /100 WBC
PHOSPHATE SERPL-MCNC: 2.7 MG/DL (ref 2.7–4.5)
PLATELET # BLD AUTO: 277 K/UL (ref 150–350)
PMV BLD AUTO: 10.1 FL (ref 9.2–12.9)
POCT GLUCOSE: 357 MG/DL (ref 70–110)
POCT GLUCOSE: 366 MG/DL (ref 70–110)
POCT GLUCOSE: 66 MG/DL (ref 70–110)
POTASSIUM SERPL-SCNC: 3.4 MMOL/L (ref 3.5–5.1)
RBC # BLD AUTO: 3.44 M/UL (ref 4.6–6.2)
SODIUM SERPL-SCNC: 136 MMOL/L (ref 136–145)
WBC # BLD AUTO: 10.44 K/UL (ref 3.9–12.7)

## 2020-02-27 PROCEDURE — 63600175 PHARM REV CODE 636 W HCPCS: Performed by: HOSPITALIST

## 2020-02-27 PROCEDURE — A4216 STERILE WATER/SALINE, 10 ML: HCPCS | Performed by: HOSPITALIST

## 2020-02-27 PROCEDURE — 94761 N-INVAS EAR/PLS OXIMETRY MLT: CPT

## 2020-02-27 PROCEDURE — 11000001 HC ACUTE MED/SURG PRIVATE ROOM

## 2020-02-27 PROCEDURE — 63600175 PHARM REV CODE 636 W HCPCS: Performed by: INTERNAL MEDICINE

## 2020-02-27 PROCEDURE — 85007 BL SMEAR W/DIFF WBC COUNT: CPT

## 2020-02-27 PROCEDURE — 82947 ASSAY GLUCOSE BLOOD QUANT: CPT

## 2020-02-27 PROCEDURE — 25000003 PHARM REV CODE 250: Performed by: HOSPITALIST

## 2020-02-27 PROCEDURE — 80048 BASIC METABOLIC PNL TOTAL CA: CPT

## 2020-02-27 PROCEDURE — 84100 ASSAY OF PHOSPHORUS: CPT

## 2020-02-27 PROCEDURE — 85027 COMPLETE CBC AUTOMATED: CPT

## 2020-02-27 PROCEDURE — 36415 COLL VENOUS BLD VENIPUNCTURE: CPT

## 2020-02-27 PROCEDURE — 25000003 PHARM REV CODE 250: Performed by: INTERNAL MEDICINE

## 2020-02-27 RX ORDER — INSULIN ASPART 100 [IU]/ML
5 INJECTION, SOLUTION INTRAVENOUS; SUBCUTANEOUS
Status: DISCONTINUED | OUTPATIENT
Start: 2020-02-27 | End: 2020-03-08 | Stop reason: HOSPADM

## 2020-02-27 RX ORDER — MUPIROCIN 20 MG/G
OINTMENT TOPICAL 2 TIMES DAILY
Status: DISCONTINUED | OUTPATIENT
Start: 2020-02-27 | End: 2020-03-08 | Stop reason: HOSPADM

## 2020-02-27 RX ORDER — INSULIN ASPART 100 [IU]/ML
15 INJECTION, SOLUTION INTRAVENOUS; SUBCUTANEOUS ONCE
Status: COMPLETED | OUTPATIENT
Start: 2020-02-27 | End: 2020-02-27

## 2020-02-27 RX ADMIN — POTASSIUM PHOSPHATE, MONOBASIC 1000 MG: 500 TABLET, SOLUBLE ORAL at 03:02

## 2020-02-27 RX ADMIN — TRAMADOL HYDROCHLORIDE 50 MG: 50 TABLET ORAL at 06:02

## 2020-02-27 RX ADMIN — ACETAMINOPHEN 650 MG: 325 TABLET ORAL at 03:02

## 2020-02-27 RX ADMIN — GABAPENTIN 300 MG: 300 CAPSULE ORAL at 09:02

## 2020-02-27 RX ADMIN — INSULIN ASPART 5 UNITS: 100 INJECTION, SOLUTION INTRAVENOUS; SUBCUTANEOUS at 09:02

## 2020-02-27 RX ADMIN — INSULIN ASPART 10 UNITS: 100 INJECTION, SOLUTION INTRAVENOUS; SUBCUTANEOUS at 12:02

## 2020-02-27 RX ADMIN — INSULIN ASPART 5 UNITS: 100 INJECTION, SOLUTION INTRAVENOUS; SUBCUTANEOUS at 11:02

## 2020-02-27 RX ADMIN — VANCOMYCIN HYDROCHLORIDE 1000 MG: 1 INJECTION, POWDER, LYOPHILIZED, FOR SOLUTION INTRAVENOUS at 04:02

## 2020-02-27 RX ADMIN — IBUPROFEN 400 MG: 400 TABLET, FILM COATED ORAL at 02:02

## 2020-02-27 RX ADMIN — INSULIN ASPART 5 UNITS: 100 INJECTION, SOLUTION INTRAVENOUS; SUBCUTANEOUS at 04:02

## 2020-02-27 RX ADMIN — INSULIN ASPART 15 UNITS: 100 INJECTION, SOLUTION INTRAVENOUS; SUBCUTANEOUS at 10:02

## 2020-02-27 RX ADMIN — SODIUM CHLORIDE 1000 ML: 0.9 INJECTION, SOLUTION INTRAVENOUS at 10:02

## 2020-02-27 RX ADMIN — Medication 10 ML: at 12:02

## 2020-02-27 RX ADMIN — Medication 10 ML: at 05:02

## 2020-02-27 RX ADMIN — VANCOMYCIN HYDROCHLORIDE 1000 MG: 1 INJECTION, POWDER, LYOPHILIZED, FOR SOLUTION INTRAVENOUS at 05:02

## 2020-02-27 RX ADMIN — Medication 10 ML: at 06:02

## 2020-02-27 RX ADMIN — MUPIROCIN: 20 OINTMENT TOPICAL at 04:02

## 2020-02-27 RX ADMIN — CEFTRIAXONE SODIUM 2 G: 2 INJECTION, SOLUTION INTRAVENOUS at 03:02

## 2020-02-27 RX ADMIN — GABAPENTIN 300 MG: 300 CAPSULE ORAL at 08:02

## 2020-02-27 RX ADMIN — GABAPENTIN 300 MG: 300 CAPSULE ORAL at 03:02

## 2020-02-27 RX ADMIN — INSULIN ASPART 10 UNITS: 100 INJECTION, SOLUTION INTRAVENOUS; SUBCUTANEOUS at 06:02

## 2020-02-27 RX ADMIN — IBUPROFEN 400 MG: 400 TABLET, FILM COATED ORAL at 08:02

## 2020-02-27 RX ADMIN — ENOXAPARIN SODIUM 40 MG: 100 INJECTION SUBCUTANEOUS at 05:02

## 2020-02-27 RX ADMIN — PANTOPRAZOLE SODIUM 40 MG: 40 TABLET, DELAYED RELEASE ORAL at 08:02

## 2020-02-27 RX ADMIN — ATORVASTATIN CALCIUM 40 MG: 40 TABLET, FILM COATED ORAL at 08:02

## 2020-02-27 RX ADMIN — POTASSIUM PHOSPHATE, MONOBASIC 1000 MG: 500 TABLET, SOLUBLE ORAL at 10:02

## 2020-02-27 RX ADMIN — POTASSIUM PHOSPHATE, MONOBASIC 1000 MG: 500 TABLET, SOLUBLE ORAL at 08:02

## 2020-02-27 NOTE — CONSULTS
[facility]  Podiatry  Consult Note    Patient Name: Cali Mabry  MRN: [id number]  Admission Date: 2/22/2020  Hospital Length of Stay: 4 days  Attending Physician: Kalyani Alberto MD  Primary Care Provider: Primary Doctor No     Consults  Subjective:     History of Present Illness: 54 y.o male PMH DM2, IVDU  Consulted for right posterior heel wound. Patient reports callus present x one year with dry flaky skin. Reports pain to area. Patient denies IVDU right foot. States he has never injected his feet. Also reports h/o poor circulation followed at  for this.      Scheduled Meds:   atorvastatin  40 mg Oral Daily    azithromycin  500 mg Intravenous Q24H    cefTRIAXone (ROCEPHIN) IVPB  2 g Intravenous Q24H    enoxaparin  40 mg Subcutaneous Daily    gabapentin  300 mg Oral TID    insulin aspart U-100  8 Units Subcutaneous TIDWM    insulin detemir U-100  25 Units Subcutaneous Daily    nicotine  1 patch Transdermal Daily    pantoprazole  40 mg Oral Daily    potassium phosphate (monobasic)  1,000 mg Oral TID    senna-docusate 8.6-50 mg  1 tablet Oral BID    sodium chloride 0.9%  10 mL Intravenous Q6H    vancomycin (VANCOCIN) IVPB  1,000 mg Intravenous Q12H     Continuous Infusions:  PRN Meds:acetaminophen, acetaminophen, dextromethorphan-guaifenesin  mg/5 ml, dextrose 50%, dextrose 50%, dextrose 50%, dextrose 50%, glucagon (human recombinant), glucose, glucose, ibuprofen, influenza, insulin aspart U-100, ondansetron, pneumoc 13-molly conj-dip cr(PF), sodium chloride 0.9%, sodium chloride 0.9%, Flushing PICC Protocol **AND** sodium chloride 0.9% **AND** sodium chloride 0.9%, traMADol    Review of patient's allergies indicates:  No Known Allergies     Past Medical History:   Diagnosis Date    Diabetes mellitus     Hepatitis     HTN (hypertension)     Pancreatitis      Past Surgical History:   Procedure Laterality Date    CARDIAC SURGERY  1999    stent [facility]. ([facility])    SHOULDER SURGERY  2013    right shoulder, spur removal.       Family History     Problem Relation (Age of Onset)    Asthma Mother        Tobacco Use    Smoking status: Current Every Day Smoker     Packs/day: 1.00     Years: 20.00     Pack years: 20.00     Types: Cigarettes     Start date: 4/3/1981    Smokeless tobacco: Never Used   Substance and Sexual Activity    Alcohol use: No     Alcohol/week: 0.0 standard drinks    Drug use: Yes     Types: IV, Marijuana, Heroin     Comment: currently, used today, herion    Sexual activity: Yes     Partners: Female     Review of Systems   Constitutional: Positive for activity change.   Respiratory: Negative for shortness of breath.    Cardiovascular: Negative for chest pain and leg swelling.   Gastrointestinal: Negative for nausea and vomiting.   Musculoskeletal: Positive for arthralgias.   Skin: Positive for wound.   Neurological: Positive for numbness. Negative for weakness.     Objective:     Vital Signs (Most Recent):  Temp: 97.8 °F (36.6 °C) (02/26/20 1534)  Pulse: 94 (02/26/20 1534)  Resp: 18 (02/26/20 1534)  BP: 119/67 (02/26/20 1534)  SpO2: 96 % (02/26/20 1534) Vital Signs (24h Range):  Temp:  [97.8 °F (36.6 °C)-100.5 °F (38.1 °C)] 97.8 °F (36.6 °C)  Pulse:  [] 94  Resp:  [18-20] 18  SpO2:  [93 %-98 %] 96 %  BP: (112-137)/(55-78) 119/67     Weight: 57.4 kg (126 lb 8.7 oz)  Body mass index is 19.24 kg/m².    Foot Exam    Right Foot/Ankle     Inspection and Palpation  Tenderness: bony tenderness   Skin Exam: ulcer;     Neurovascular  Dorsalis pedis: absent  Posterior tibial: absent      Left Foot/Ankle      Inspection and Palpation  Skin Exam: ulcer;     Neurovascular  Dorsalis pedis: absent  Posterior tibial: absent          Superficial abrasion right posterior heel with overlying callus. No purulent drainage noted. Mild erythema to area.     Left leg: anterior leg abrasion with necrotic eschar.     2/26/20      S/p trimming of callus.        Superficial abrasion left anterior leg.           Laboratory:  CBC:   Recent Labs   Lab 02/26/20  0328   WBC 5.94   RBC 3.92*   HGB 10.0*   HCT 30.6*      MCV 78*   MCH 25.5*   MCHC 32.7     CMP:   Recent Labs   Lab 02/22/20  0540  02/26/20  0328   *   < > 211*   CALCIUM 9.4   < > 7.8*   ALBUMIN 2.6*  --   --    PROT 8.8*  --   --    *   < > 134*   K 5.1   < > 3.3*   CO2 7*   < > 21*   CL 95   < > 104   BUN 21*   < > 15   CREATININE 1.9*   < > 0.9   ALKPHOS 129  --   --    ALT 8*  --   --    AST 8*  --   --    BILITOT 0.6  --   --     < > = values in this interval not displayed.       Diagnostic Results:  Xray right calcaneus : ordered    PAULA: pending    Clinical Findings:  Superficial abrasion right posterior heel.     Assessment/Plan:     Active Diagnoses:    Diagnosis Date Noted POA    PRINCIPAL PROBLEM:  Diabetic ketoacidosis without coma associated with diabetes mellitus due to underlying condition [E08.10] 02/22/2020 Yes    Hepatitis [K75.9]  Yes    MRSA bacteremia [R78.81] 02/23/2020 Yes    Pneumonia due to infectious organism [J18.9] 02/22/2020 Yes    Intravenous drug abuse [F19.10] 11/23/2017 Yes     Chronic    Hyperlipidemia [E78.5] 11/23/2017 Yes     Chronic    Chronic hepatitis C [B18.2] 11/23/2017 Yes     Chronic    Type 2 diabetes mellitus, uncontrolled [E11.65] 04/03/2016 Yes     Chronic    Pancreatitis, chronic [K86.1] 04/03/2016 Yes    Essential hypertension [I10] 04/03/2016 Yes     Chronic      Problems Resolved During this Admission:    Diagnosis Date Noted Date Resolved POA    RAMYA (acute kidney injury) [N17.9] 02/22/2020 02/24/2020 Yes       With patient's permission sterile #15 blade used to trim callus right posterior heel revealing superficial abrasion.   Painted with betadine covered with mepilex border.   Nursing orders placed for daily dressing changes    Xray right foot ordered  PAULA ordered    If still inpatient podiatry will see on 2/28/20.     Thank  you for your consult. I will follow-up with patient. Please contact us if you have any additional questions.    Melva Nice DPM  Podiatry  [facility]

## 2020-02-27 NOTE — SUBJECTIVE & OBJECTIVE
Past Medical History:   Diagnosis Date    Diabetes mellitus     Hepatitis     HTN (hypertension)     Pancreatitis        Past Surgical History:   Procedure Laterality Date    CARDIAC SURGERY  1999    stent placed. ([facility])    SHOULDER SURGERY  2013    right shoulder, spur removal.       Review of patient's allergies indicates:  No Known Allergies    No current facility-administered medications on file prior to encounter.      Current Outpatient Medications on File Prior to Encounter   Medication Sig    atorvastatin (LIPITOR) 40 MG tablet Take 40 mg by mouth once daily.    gabapentin (NEURONTIN) 300 MG capsule Take 300 mg by mouth.    ibuprofen (ADVIL,MOTRIN) 600 MG tablet Take 1 tablet (600 mg total) by mouth every 6 (six) hours as needed for Pain.    insulin aspart U-100 (NOVOLOG) 100 unit/mL (3 mL) InPn pen Inject 10 Units into the skin.    LANTUS 100 unit/mL injection Inject 15 Units into the skin 2 (two) times daily. (Patient taking differently: Inject 30 Units into the skin once daily. )    naloxone (NARCAN) 1 mg/mL injection 2 mg (1 mg per nostril) by Nasal route as needed for opioid overdose; may repeat in 3 to 5 minutes if not effective. Call 911    naloxone (NARCAN) 4 mg/actuation Spry 4mg by nasal route as needed for opioid overdose; may repeat every 2-3 minutes in alternating nostrils until medical help arrives. Call 911    nicotine (NICODERM CQ) 21 mg/24 hr Place 1 patch onto the skin once daily.    omeprazole (PRILOSEC) 40 MG capsule Take 1 capsule (40 mg total) by mouth once daily.     Family History     Problem Relation (Age of Onset)    Asthma Mother        Tobacco Use    Smoking status: Current Every Day Smoker     Packs/day: 1.00     Years: 20.00     Pack years: 20.00     Types: Cigarettes     Start date: 4/3/1981    Smokeless tobacco: Never Used   Substance and Sexual Activity    Alcohol use: No     Alcohol/week: 0.0 standard drinks    Drug use: Yes     Types: IV,  Marijuana, Heroin     Comment: currently, used today, herion    Sexual activity: Yes     Partners: Female     Review of Systems   Constitutional: Positive for activity change and appetite change.   HENT: Negative for congestion and dental problem.    Eyes: Negative for discharge and itching.   Respiratory: Positive for cough. Negative for apnea and chest tightness.    Cardiovascular: Negative for chest pain and leg swelling.   Gastrointestinal: Negative for abdominal pain, nausea and vomiting.   Endocrine: Negative for cold intolerance.   Genitourinary: Negative for difficulty urinating and dysuria.   Musculoskeletal: Negative for arthralgias and back pain.   Skin: Negative for color change and pallor.   Allergic/Immunologic: Negative for environmental allergies and food allergies.   Neurological: Negative for dizziness and facial asymmetry.   Hematological: Does not bruise/bleed easily.   Psychiatric/Behavioral: Negative for agitation.     Objective:     Vital Signs (Most Recent):  Temp: 98.3 °F (36.8 °C) (02/27/20 0748)  Pulse: 92 (02/27/20 0748)  Resp: 19 (02/27/20 0748)  BP: 125/73 (02/27/20 0748)  SpO2: 96 % (02/27/20 0748) Vital Signs (24h Range):  Temp:  [97.8 °F (36.6 °C)-101.4 °F (38.6 °C)] 98.3 °F (36.8 °C)  Pulse:  [] 92  Resp:  [18-32] 19  SpO2:  [92 %-96 %] 96 %  BP: (119-183)/(58-81) 125/73     Weight: 57.4 kg (126 lb 8.7 oz)  Body mass index is 19.24 kg/m².    Physical Exam   Constitutional: He is oriented to person, place, and time. No distress.   HENT:   Head: Atraumatic.   Eyes: Pupils are equal, round, and reactive to light. EOM are normal.   Neck: Normal range of motion. Neck supple.   Cardiovascular: Normal rate and regular rhythm.   Pulmonary/Chest: Effort normal.   Abdominal: Soft. He exhibits no distension.   Musculoskeletal: Normal range of motion. He exhibits no edema or deformity.   Neurological: He is oriented to person, place, and time. No cranial nerve deficit. Coordination  normal.   Skin: Skin is warm and dry.   Psychiatric: He has a normal mood and affect. His behavior is normal.         CRANIAL NERVES     CN III, IV, VI   Pupils are equal, round, and reactive to light.  Extraocular motions are normal.        Significant Labs:   CBC:   Recent Labs   Lab 02/26/20  0328 02/27/20  0603   WBC 5.94 10.44   HGB 10.0* 9.0*   HCT 30.6* 27.5*    277     CMP:   Recent Labs   Lab 02/26/20 0328 02/27/20  0603   * 136   K 3.3* 3.4*    101   CO2 21* 29   * 56*   BUN 15 12   CREATININE 0.9 0.8   CALCIUM 7.8* 7.8*   ANIONGAP 9 6*   EGFRNONAA >60 >60       Significant Imaging: reviewed.

## 2020-02-27 NOTE — ASSESSMENT & PLAN NOTE
Blood cultures (+) MRSA  Likely from IV heroin use  Continue Vancomycin  Blood cultures repeated  Echo performed: WNL  Repeat blood culture no growth,consulted SW for LTAC placement,he is IV drug user,can not go home.reperat blood culture show growth, placed picc line.    Afrberile,no place in LTAC yet.

## 2020-02-27 NOTE — PHYSICIAN QUERY
"PT Name: Cali Mabry  MR #: [id number]    Physician Query Form -Systemic Infectious Process Clarification     CDS/: Gera Gutierrez RN               Contact information:   [email or web address]      This form is a permanent document in the medical record.     Query Date: February 27, 2020     By submitting this query, we are merely seeking further clarification of documentation. Please utilize your independent clinical judgment when addressing the question(s) below.    The Medical record contains the following:     Indicators   Supporting Clinical Findings   Location in Medical Record   x HR RR BP Temp 2/22@1910  T: 104.8 rectal;  HR:132    RR: 39  2/22@2015  T: 102.5 rectal;  HR: 127,  RR: 40  2/22@2100  T: 102.2 rectal;  HR: 116;  RR: 34  2/22@2200  T: 100.9 rectal;  HR: 106,  RR: 30   flowsheet - VS simple   x Lactic Acid             Procalcitonin 2/22 lactate: 2.1, 2.3   Labs    x WBC                Bands                     CRP 2/22 WBC: 12.39, 2.76  2/23 WBC: 4.38   Labs    "   x Culture(s) Blood Culture, Routine:  Abnormal    METHICILLIN RESISTANT STAPHYLOCOCCUS AUREUS    2/22 micro    AMS, Confusion, LOC, etc.     x Organ Dysfunction / Failure RAMYA, DKA, Pneumonia, chronic Hep C..... 2/23  Progress note- Hospital medicine   x Bacteremia or Sepsis / Septic    Bacteremia due to Staphylococcus aureus  Blood cultures (+) MRSA.Likely from IV heroin use     2/24  Progress note- Hospital medicine   x Known or Suspected Source of Infection documented Pneumonia due to infectious organism...Intravenous drug abuse 2/24  Progress note- Hospital medicine      (Failed) Outpatient Treatment     x Medication Cetriaxone, IV; given;  2/25-26  Vancomycin, IV; given 2/22, 2/23, 2/25, 2/26-27;   Azithromycin, IV; given 2/25-26  Piperacillin- tazobactam, IV; given 2/22, 2/24-25    Bolus LR, 1000 ml; given 2/22 (2Xs)  Bolus NS, 1000 ml; given 2/22  MAR  "  "  "    "  "    Treatment     x Other VBG: pH: 7.138, " HCO3 6.4 2/22 point of care     Provider, please specify diagnosis or diagnoses associated with above clinical findings.      [x   ] Sepsis   [   ] Severe Sepsis with Acute Organ Dysfunction/Failure (please specify  organ dysfunction/failure): ___________________   [   ] Sepsis with Septic Shock   [   ] Other Infectious Disease (please specify): _________________________________   [   ] Other: __________________________________   [  ]  Clinically Undetermined         Please document in your progress notes daily for the duration of treatment until resolved and include in your discharge summary.

## 2020-02-27 NOTE — PLAN OF CARE
Problem: Adult Inpatient Plan of Care  Goal: Plan of Care Review  Outcome: Ongoing, Progressing  Goal: Patient-Specific Goal (Individualization)  Outcome: Ongoing, Progressing  Goal: Optimal Comfort and Wellbeing  Outcome: Ongoing, Progressing     Problem: Diabetes Comorbidity  Goal: Blood Glucose Level Within Desired Range  Outcome: Ongoing, Progressing  Intervention: Maintain Glycemic Control  Flowsheets (Taken 2/27/2020 0420)  Glycemic Management: blood glucose monitoring     Problem: Skin Injury Risk Increased  Goal: Skin Health and Integrity  Outcome: Ongoing, Progressing  Intervention: Optimize Skin Protection  Flowsheets (Taken 2/27/2020 0420)  Pressure Reduction Techniques: heels elevated off bed; frequent weight shift encouraged  Skin Protection: tubing/devices free from skin contact; incontinence pads utilized  Head of Bed (HOB): HOB at 30-45 degrees     Problem: Fall Injury Risk  Goal: Absence of Fall and Fall-Related Injury  Outcome: Ongoing, Progressing  Intervention: Identify and Manage Contributors to Fall Injury Risk  Flowsheets (Taken 2/27/2020 0420)  Self-Care Promotion: independence encouraged  Medication Review/Management: medications reviewed  Intervention: Promote Injury-Free Environment  Flowsheets (Taken 2/27/2020 0420)  Safety Promotion/Fall Prevention: assistive device/personal item within reach; bed alarm set; commode/urinal/bedpan at bedside; Fall Risk reviewed with patient/family; Fall Risk signage in place; room near unit station; side rails raised x 2; instructed to call staff for mobility  Environmental Safety Modification: assistive device/personal items within reach; lighting adjusted; clutter free environment maintained; room near unit station; room organization consistent     Problem: Infection  Goal: Infection Symptom Resolution  Outcome: Ongoing, Progressing  Intervention: Prevent or Manage Infection  Flowsheets (Taken 2/27/2020 0420)  Fever Reduction/Comfort Measures:  lightweight clothing; medication administered; lightweight bedding  Isolation Precautions: contact precautions maintained

## 2020-02-27 NOTE — PROGRESS NOTES
[facility] Hospital Medicine  Progress Note    Patient Name: Cali Mabry  MRN: [id number]  Patient Class: IP- Inpatient   Admission Date: 2/22/2020  Length of Stay: 5 days  Attending Physician: Kalyani Alberto MD  Primary Care Provider: Primary Doctor No        Subjective:     Principal Problem:Diabetic ketoacidosis without coma associated with diabetes mellitus due to underlying condition        HPI:  54-year-old male past medical history diabetes, hepatitis, hypertension history of polysubstance abuse, pancreatitis presents with abdominal pain for the last 2 days with nausea and vomiting home, and worsening body aches over the last day.  Patient reports he has not had his insulin in some time and reports his glucose is very high.  He states using IV heroin last use few days ago reportedly.  He reports coughing significant amount denies any phlegm production, denies any blood in his sputum, or any chest pain. Denies any blood in her stool, reports normal last bowel movement, reports he is urinating as normal and reports he is extremely thirsty and hungry.  Denies any fevers/chills, no extremity weakness/numbness/tingling.he is in DKA with blood sugar of 635,AG of 26 and CO 2 of 6,he has benen started on DKA protocol,RAMYA with CRT of 1.7,chest  Ray show also sign of pneumonia,patient has been started on broad spectrum IV Abx afterr bload culture is done,    Overview/Hospital Course:  54-year-old male past medical history diabetes, hepatitis, hypertension history of polysubstance abuse,IV heroin,chronic  pancreatitis presents with abdominal pain for the last 2 days with nausea and vomiting home, and worsening body aches over the last day.  Patient reports he has not had his insulin in some time and reports his glucose is very high.  He states using IV heroin last use few days ago reportedly.  He reports coughing significant amount denies any phlegm production, denies any blood in his  sputum, or any chest pain. Denies any blood in her stool, reports normal last bowel movement, reports he is urinating as normal and reports he is extremely thirsty and hungry.  Denies any fevers/chills, no extremity weakness/numbness/tingling.he is in DKA with blood sugar of 635,AG of 26 and CO 2 of 6,he has been  started on DKA protocol,RAYMA with CRT of 1.7,chest  ray show also sign of pneumonia,patient has been started on broad spectrum IV Abx afterr bload culture is done,  AG is closed,started on SQ insulin,started on diet.  Has bacteremia,staph aureus,likely from IV heroin use,alreday on Vanc.repeated blood culture,Echo to R/Oed out  endocarditis.  On Zosyn and azithromycin for pneumonia.  Repeat blood culture no growth,consulted SW for LTAC placement,he is IV drug user,can not go home.reperat blood culture show growth,will place picc line.  Afrberile,no place in LTAC yet.  Podiatry cleaned right heel callus,    Past Medical History:   Diagnosis Date    Diabetes mellitus     Hepatitis     HTN (hypertension)     Pancreatitis        Past Surgical History:   Procedure Laterality Date    [facility]    SHOULDER SURGERY  2013    right shoulder, spur removal.       Review of patient's allergies indicates:  No Known Allergies    No current facility-administered medications on file prior to encounter.      Current Outpatient Medications on File Prior to Encounter   Medication Sig    atorvastatin (LIPITOR) 40 MG tablet Take 40 mg by mouth once daily.    gabapentin (NEURONTIN) 300 MG capsule Take 300 mg by mouth.    ibuprofen (ADVIL,MOTRIN) 600 MG tablet Take 1 tablet (600 mg total) by mouth every 6 (six) hours as needed for Pain.    insulin aspart U-100 (NOVOLOG) 100 unit/mL (3 mL) InPn pen Inject 10 Units into the skin.    LANTUS 100 unit/mL injection Inject 15 Units into the skin 2 (two) times daily. (Patient taking differently: Inject 30 Units into the skin once  daily. )    naloxone (NARCAN) 1 mg/mL injection 2 mg (1 mg per nostril) by Nasal route as needed for opioid overdose; may repeat in 3 to 5 minutes if not effective. Call 911    naloxone (NARCAN) 4 mg/actuation Spry 4mg by nasal route as needed for opioid overdose; may repeat every 2-3 minutes in alternating nostrils until medical help arrives. Call 911    nicotine (NICODERM CQ) 21 mg/24 hr Place 1 patch onto the skin once daily.    omeprazole (PRILOSEC) 40 MG capsule Take 1 capsule (40 mg total) by mouth once daily.     Family History     Problem Relation (Age of Onset)    Asthma Mother        Tobacco Use    Smoking status: Current Every Day Smoker     Packs/day: 1.00     Years: 20.00     Pack years: 20.00     Types: Cigarettes     Start date: 4/3/1981    Smokeless tobacco: Never Used   Substance and Sexual Activity    Alcohol use: No     Alcohol/week: 0.0 standard drinks    Drug use: Yes     Types: IV, Marijuana, Heroin     Comment: currently, used today, herion    Sexual activity: Yes     Partners: Female     Review of Systems   Constitutional: Positive for activity change and appetite change.   HENT: Negative for congestion and dental problem.    Eyes: Negative for discharge and itching.   Respiratory: Positive for cough. Negative for apnea and chest tightness.    Cardiovascular: Negative for chest pain and leg swelling.   Gastrointestinal: Negative for abdominal pain, nausea and vomiting.   Endocrine: Negative for cold intolerance.   Genitourinary: Negative for difficulty urinating and dysuria.   Musculoskeletal: Negative for arthralgias and back pain.   Skin: Negative for color change and pallor.   Allergic/Immunologic: Negative for environmental allergies and food allergies.   Neurological: Negative for dizziness and facial asymmetry.   Hematological: Does not bruise/bleed easily.   Psychiatric/Behavioral: Negative for agitation.     Objective:     Vital Signs (Most Recent):  Temp: 98.3 °F (36.8 °C)  (02/27/20 0748)  Pulse: 92 (02/27/20 0748)  Resp: 19 (02/27/20 0748)  BP: 125/73 (02/27/20 0748)  SpO2: 96 % (02/27/20 0748) Vital Signs (24h Range):  Temp:  [97.8 °F (36.6 °C)-101.4 °F (38.6 °C)] 98.3 °F (36.8 °C)  Pulse:  [] 92  Resp:  [18-32] 19  SpO2:  [92 %-96 %] 96 %  BP: (119-183)/(58-81) 125/73     Weight: 57.4 kg (126 lb 8.7 oz)  Body mass index is 19.24 kg/m².    Physical Exam   Constitutional: He is oriented to person, place, and time. No distress.   HENT:   Head: Atraumatic.   Eyes: Pupils are equal, round, and reactive to light. EOM are normal.   Neck: Normal range of motion. Neck supple.   Cardiovascular: Normal rate and regular rhythm.   Pulmonary/Chest: Effort normal.   Abdominal: Soft. He exhibits no distension.   Musculoskeletal: Normal range of motion. He exhibits no edema or deformity.   Neurological: He is oriented to person, place, and time. No cranial nerve deficit. Coordination normal.   Skin: Skin is warm and dry.   Psychiatric: He has a normal mood and affect. His behavior is normal.         CRANIAL NERVES     CN III, IV, VI   Pupils are equal, round, and reactive to light.  Extraocular motions are normal.        Significant Labs:   CBC:   Recent Labs   Lab 02/26/20  0328 02/27/20  0603   WBC 5.94 10.44   HGB 10.0* 9.0*   HCT 30.6* 27.5*    277     CMP:   Recent Labs   Lab 02/26/20  0328 02/27/20  0603   * 136   K 3.3* 3.4*    101   CO2 21* 29   * 56*   BUN 15 12   CREATININE 0.9 0.8   CALCIUM 7.8* 7.8*   ANIONGAP 9 6*   EGFRNONAA >60 >60       Significant Imaging: reviewed.      Assessment/Plan:      * Diabetic ketoacidosis without coma associated with diabetes mellitus due to underlying condition  DKA resolved  Now with hypoglycemia  Has received D50, patient eating well  If hypoglycemia persist, will start D51/2NS      MRSA bacteremia  Blood cultures (+) MRSA  Likely from IV heroin use  Continue Vancomycin  Blood cultures repeated  Echo performed:  WNL  Repeat blood culture no growth,consulted SW for LTAC placement,he is IV drug user,can not go home.reperat blood culture show growth, placed picc line.    Afrberile,no place in LTAC yet.        Pneumonia due to infectious organism  Per clinical presentation and imaging,will continue with IV Abx.      Chronic hepatitis C  Will monitor.      Intravenous drug abuse  Consult on IV heroin cessation use  Patient states that he quit 3-4 weeks ago      Hyperlipidemia  On statin.      Pancreatitis, chronic  Stable at this  time.      Essential hypertension  Stable at this time with no meds  Will monitor throughout hospital course    Type 2 diabetes mellitus, uncontrolled  Currently hypoglycemia  Will monitor blood glucose q.a.c. and HS  Will adjust regimen based on blood glucose trends        VTE Risk Mitigation (From admission, onward)         Ordered     enoxaparin injection 40 mg  Daily      02/22/20 1157     IP VTE HIGH RISK PATIENT  Once      02/22/20 1157     Place DAWIT hose  Until discontinued      02/22/20 1157                      Kalyani Alberto MD  Department of Hospital Medicine   [facility]

## 2020-02-27 NOTE — NURSING
VS elevated 183/81 R. 32 Pulse 116. MEWS score 6. MD notified of this. PRN Tylenol administered for elevated temp. VS reassesed and now 125/72 R. 20,  O2 92%. PRN cough medicine administered as well as PRN pain medication. Temp reassessed now 99.0. Attempting to place oxygen on patient for low O2 SAT, patient refusing to have it placed on. Moaning and groaning and blurting out curse words. Will re-attempt to place oxygen on patient. Safety measures in place. Call light in reach.

## 2020-02-28 LAB
ANION GAP SERPL CALC-SCNC: 9 MMOL/L (ref 8–16)
BACTERIA BLD CULT: NORMAL
BACTERIA BLD CULT: NORMAL
BASOPHILS # BLD AUTO: ABNORMAL K/UL (ref 0–0.2)
BASOPHILS NFR BLD: 0 % (ref 0–1.9)
BUN SERPL-MCNC: 13 MG/DL (ref 6–20)
CALCIUM SERPL-MCNC: 7.8 MG/DL (ref 8.7–10.5)
CHLORIDE SERPL-SCNC: 99 MMOL/L (ref 95–110)
CO2 SERPL-SCNC: 25 MMOL/L (ref 23–29)
CREAT SERPL-MCNC: 0.8 MG/DL (ref 0.5–1.4)
DIFFERENTIAL METHOD: ABNORMAL
EOSINOPHIL # BLD AUTO: ABNORMAL K/UL (ref 0–0.5)
EOSINOPHIL NFR BLD: 0 % (ref 0–8)
ERYTHROCYTE [DISTWIDTH] IN BLOOD BY AUTOMATED COUNT: 15.2 % (ref 11.5–14.5)
EST. GFR  (AFRICAN AMERICAN): >60 ML/MIN/1.73 M^2
EST. GFR  (NON AFRICAN AMERICAN): >60 ML/MIN/1.73 M^2
GLUCOSE SERPL-MCNC: 372 MG/DL (ref 70–110)
HCT VFR BLD AUTO: 29.9 % (ref 40–54)
HGB BLD-MCNC: 9.6 G/DL (ref 14–18)
IMM GRANULOCYTES # BLD AUTO: ABNORMAL K/UL (ref 0–0.04)
IMM GRANULOCYTES NFR BLD AUTO: ABNORMAL % (ref 0–0.5)
LYMPHOCYTES # BLD AUTO: ABNORMAL K/UL (ref 1–4.8)
LYMPHOCYTES NFR BLD: 15 % (ref 18–48)
MCH RBC QN AUTO: 26.2 PG (ref 27–31)
MCHC RBC AUTO-ENTMCNC: 32.1 G/DL (ref 32–36)
MCV RBC AUTO: 82 FL (ref 82–98)
METAMYELOCYTES NFR BLD MANUAL: 4 %
MONOCYTES # BLD AUTO: ABNORMAL K/UL (ref 0.3–1)
MONOCYTES NFR BLD: 6 % (ref 4–15)
MYELOCYTES NFR BLD MANUAL: 8 %
NEUTROPHILS NFR BLD: 62 % (ref 38–73)
NEUTS BAND NFR BLD MANUAL: 5 %
NRBC BLD-RTO: 0 /100 WBC
PHOSPHATE SERPL-MCNC: 2.7 MG/DL (ref 2.7–4.5)
PLATELET # BLD AUTO: 354 K/UL (ref 150–350)
PMV BLD AUTO: 9.9 FL (ref 9.2–12.9)
POCT GLUCOSE: 273 MG/DL (ref 70–110)
POCT GLUCOSE: 320 MG/DL (ref 70–110)
POCT GLUCOSE: 412 MG/DL (ref 70–110)
POCT GLUCOSE: >500 MG/DL (ref 70–110)
POTASSIUM SERPL-SCNC: 3.8 MMOL/L (ref 3.5–5.1)
RBC # BLD AUTO: 3.67 M/UL (ref 4.6–6.2)
SODIUM SERPL-SCNC: 133 MMOL/L (ref 136–145)
WBC # BLD AUTO: 11.64 K/UL (ref 3.9–12.7)

## 2020-02-28 PROCEDURE — 99232 SBSQ HOSP IP/OBS MODERATE 35: CPT | Mod: ,,, | Performed by: PODIATRIST

## 2020-02-28 PROCEDURE — A4216 STERILE WATER/SALINE, 10 ML: HCPCS | Performed by: HOSPITALIST

## 2020-02-28 PROCEDURE — 25000003 PHARM REV CODE 250: Performed by: PODIATRIST

## 2020-02-28 PROCEDURE — 84100 ASSAY OF PHOSPHORUS: CPT

## 2020-02-28 PROCEDURE — 63600175 PHARM REV CODE 636 W HCPCS: Performed by: HOSPITALIST

## 2020-02-28 PROCEDURE — 25000003 PHARM REV CODE 250: Performed by: INTERNAL MEDICINE

## 2020-02-28 PROCEDURE — 11000001 HC ACUTE MED/SURG PRIVATE ROOM

## 2020-02-28 PROCEDURE — 99232 PR SUBSEQUENT HOSPITAL CARE,LEVL II: ICD-10-PCS | Mod: ,,, | Performed by: PODIATRIST

## 2020-02-28 PROCEDURE — 80048 BASIC METABOLIC PNL TOTAL CA: CPT

## 2020-02-28 PROCEDURE — 85027 COMPLETE CBC AUTOMATED: CPT

## 2020-02-28 PROCEDURE — 63600175 PHARM REV CODE 636 W HCPCS: Performed by: INTERNAL MEDICINE

## 2020-02-28 PROCEDURE — 85007 BL SMEAR W/DIFF WBC COUNT: CPT

## 2020-02-28 PROCEDURE — 25000003 PHARM REV CODE 250: Performed by: HOSPITALIST

## 2020-02-28 RX ORDER — CYCLOBENZAPRINE HCL 10 MG
10 TABLET ORAL 3 TIMES DAILY
Status: DISCONTINUED | OUTPATIENT
Start: 2020-02-28 | End: 2020-02-29

## 2020-02-28 RX ORDER — CLONIDINE HYDROCHLORIDE 0.1 MG/1
0.1 TABLET ORAL 3 TIMES DAILY
Status: DISCONTINUED | OUTPATIENT
Start: 2020-02-28 | End: 2020-03-01

## 2020-02-28 RX ORDER — HYDROXYZINE PAMOATE 25 MG/1
25 CAPSULE ORAL EVERY 8 HOURS
Status: DISCONTINUED | OUTPATIENT
Start: 2020-02-28 | End: 2020-03-08 | Stop reason: HOSPADM

## 2020-02-28 RX ORDER — HYDROXYZINE PAMOATE 25 MG/1
25 CAPSULE ORAL EVERY 8 HOURS PRN
Status: DISCONTINUED | OUTPATIENT
Start: 2020-02-28 | End: 2020-02-28

## 2020-02-28 RX ORDER — GUAIFENESIN/DEXTROMETHORPHAN 100-10MG/5
10 SYRUP ORAL EVERY 4 HOURS
Status: DISCONTINUED | OUTPATIENT
Start: 2020-02-28 | End: 2020-03-02

## 2020-02-28 RX ADMIN — HYDROXYZINE PAMOATE 25 MG: 25 CAPSULE ORAL at 08:02

## 2020-02-28 RX ADMIN — CYCLOBENZAPRINE HYDROCHLORIDE 10 MG: 10 TABLET, FILM COATED ORAL at 02:02

## 2020-02-28 RX ADMIN — POTASSIUM PHOSPHATE, MONOBASIC 1000 MG: 500 TABLET, SOLUBLE ORAL at 08:02

## 2020-02-28 RX ADMIN — GABAPENTIN 300 MG: 300 CAPSULE ORAL at 08:02

## 2020-02-28 RX ADMIN — VANCOMYCIN HYDROCHLORIDE 1000 MG: 1 INJECTION, POWDER, LYOPHILIZED, FOR SOLUTION INTRAVENOUS at 04:02

## 2020-02-28 RX ADMIN — ACETAMINOPHEN 650 MG: 325 TABLET ORAL at 08:02

## 2020-02-28 RX ADMIN — MUPIROCIN: 20 OINTMENT TOPICAL at 08:02

## 2020-02-28 RX ADMIN — CLONIDINE HYDROCHLORIDE 0.1 MG: 0.1 TABLET ORAL at 02:02

## 2020-02-28 RX ADMIN — HYDROXYZINE PAMOATE 25 MG: 25 CAPSULE ORAL at 09:02

## 2020-02-28 RX ADMIN — GUAIFENESIN AND DEXTROMETHORPHAN 10 ML: 100; 10 SYRUP ORAL at 06:02

## 2020-02-28 RX ADMIN — POTASSIUM PHOSPHATE, MONOBASIC 1000 MG: 500 TABLET, SOLUBLE ORAL at 02:02

## 2020-02-28 RX ADMIN — Medication 10 ML: at 12:02

## 2020-02-28 RX ADMIN — GUAIFENESIN AND DEXTROMETHORPHAN 10 ML: 100; 10 SYRUP ORAL at 02:02

## 2020-02-28 RX ADMIN — Medication 10 ML: at 06:02

## 2020-02-28 RX ADMIN — GABAPENTIN 300 MG: 300 CAPSULE ORAL at 02:02

## 2020-02-28 RX ADMIN — CEFTRIAXONE SODIUM 2 G: 2 INJECTION, SOLUTION INTRAVENOUS at 03:02

## 2020-02-28 RX ADMIN — CYCLOBENZAPRINE HYDROCHLORIDE 10 MG: 10 TABLET, FILM COATED ORAL at 08:02

## 2020-02-28 RX ADMIN — CYCLOBENZAPRINE HYDROCHLORIDE 10 MG: 10 TABLET, FILM COATED ORAL at 10:02

## 2020-02-28 RX ADMIN — INSULIN ASPART 5 UNITS: 100 INJECTION, SOLUTION INTRAVENOUS; SUBCUTANEOUS at 05:02

## 2020-02-28 RX ADMIN — INSULIN ASPART 6 UNITS: 100 INJECTION, SOLUTION INTRAVENOUS; SUBCUTANEOUS at 05:02

## 2020-02-28 RX ADMIN — INSULIN ASPART 5 UNITS: 100 INJECTION, SOLUTION INTRAVENOUS; SUBCUTANEOUS at 08:02

## 2020-02-28 RX ADMIN — INSULIN ASPART 10 UNITS: 100 INJECTION, SOLUTION INTRAVENOUS; SUBCUTANEOUS at 08:02

## 2020-02-28 RX ADMIN — CLONIDINE HYDROCHLORIDE 0.1 MG: 0.1 TABLET ORAL at 10:02

## 2020-02-28 RX ADMIN — GUAIFENESIN AND DEXTROMETHORPHAN 10 ML: 100; 10 SYRUP ORAL at 05:02

## 2020-02-28 RX ADMIN — IBUPROFEN 400 MG: 400 TABLET, FILM COATED ORAL at 06:02

## 2020-02-28 RX ADMIN — DOCUSATE SODIUM - SENNOSIDES 1 TABLET: 50; 8.6 TABLET, FILM COATED ORAL at 08:02

## 2020-02-28 RX ADMIN — VANCOMYCIN HYDROCHLORIDE 1000 MG: 1 INJECTION, POWDER, LYOPHILIZED, FOR SOLUTION INTRAVENOUS at 05:02

## 2020-02-28 RX ADMIN — GUAIFENESIN AND DEXTROMETHORPHAN 10 ML: 100; 10 SYRUP ORAL at 08:02

## 2020-02-28 RX ADMIN — ATORVASTATIN CALCIUM 40 MG: 40 TABLET, FILM COATED ORAL at 08:02

## 2020-02-28 RX ADMIN — PANTOPRAZOLE SODIUM 40 MG: 40 TABLET, DELAYED RELEASE ORAL at 08:02

## 2020-02-28 RX ADMIN — HYDROXYZINE PAMOATE 25 MG: 25 CAPSULE ORAL at 10:02

## 2020-02-28 RX ADMIN — Medication: at 06:02

## 2020-02-28 RX ADMIN — INSULIN ASPART 10 UNITS: 100 INJECTION, SOLUTION INTRAVENOUS; SUBCUTANEOUS at 12:02

## 2020-02-28 RX ADMIN — Medication 10 ML: at 05:02

## 2020-02-28 RX ADMIN — ENOXAPARIN SODIUM 40 MG: 100 INJECTION SUBCUTANEOUS at 05:02

## 2020-02-28 RX ADMIN — CLONIDINE HYDROCHLORIDE 0.1 MG: 0.1 TABLET ORAL at 08:02

## 2020-02-28 RX ADMIN — GUAIFENESIN AND DEXTROMETHORPHAN 10 ML: 100; 10 SYRUP ORAL at 09:02

## 2020-02-28 RX ADMIN — TRAMADOL HYDROCHLORIDE 50 MG: 50 TABLET ORAL at 03:02

## 2020-02-28 RX ADMIN — INSULIN ASPART 5 UNITS: 100 INJECTION, SOLUTION INTRAVENOUS; SUBCUTANEOUS at 12:02

## 2020-02-28 NOTE — PLAN OF CARE
Problem: Adult Inpatient Plan of Care  Goal: Plan of Care Review  Outcome: Ongoing, Progressing  Flowsheets (Taken 2/27/2020 2345)  Plan of Care Reviewed With: patient  Goal: Optimal Comfort and Wellbeing  Outcome: Ongoing, Progressing  Intervention: Provide Person-Centered Care  Flowsheets (Taken 2/27/2020 2345)  Trust Relationship/Rapport: care explained; choices provided; emotional support provided; empathic listening provided; questions answered; questions encouraged; reassurance provided; thoughts/feelings acknowledged     Problem: Fall Injury Risk  Goal: Absence of Fall and Fall-Related Injury  Outcome: Ongoing, Progressing  Intervention: Identify and Manage Contributors to Fall Injury Risk  Flowsheets (Taken 2/27/2020 2345)  Self-Care Promotion: independence encouraged; BADL personal objects within reach; BADL personal routines maintained  Medication Review/Management: medications reviewed  Intervention: Promote Injury-Free Environment  Flowsheets (Taken 2/27/2020 2345)  Safety Promotion/Fall Prevention: assistive device/personal item within reach; bed alarm set; Fall Risk reviewed with patient/family; lighting adjusted; medications reviewed; nonskid shoes/socks when out of bed; room near unit station; side rails raised x 2; instructed to call staff for mobility  Environmental Safety Modification: assistive device/personal items within reach; clutter free environment maintained; lighting adjusted; room near unit station; room organization consistent     Problem: Infection  Goal: Infection Symptom Resolution  Outcome: Ongoing, Progressing  Intervention: Prevent or Manage Infection  Flowsheets (Taken 2/27/2020 2345)  Fever Reduction/Comfort Measures: lightweight bedding; lightweight clothing

## 2020-02-28 NOTE — NURSING
Accuchecks X2 greater than 500. PRN insulin given as ordered and STAT glucose drawn per this nurse and sent to lab. Patient remains difficult with staff, angry, demanding and requesting more insulin. Cont's to refuse vital signs.

## 2020-02-28 NOTE — NURSING
Dr. Campbell notified of Patient's accuchecks and STAT glucose result of 603. Also notified that Patient refusing vital signs and PO potassium.

## 2020-02-28 NOTE — NURSING
"Kelsey PCT to report patient cursing, refusing vital signs and demanding medicine and threatening to leave. This nurse in room and Patient cont's to refuse vital signs, states his "blood pressure is fine, everything's fine. I need my insulin, I've been asking for it all day and they haven't given me any and my sugar is 500". This nurse to explain to Patient that he has received insulin multiple times today. Patient had fruit and pies sitting at bedside that he was asked not to consume, stated his "mom brought them to me". Explained to Patient that he will be getting insulin again per MAR.  "

## 2020-02-28 NOTE — PLAN OF CARE
02/28/20 1030   Discharge Reassessment   Assessment Type Discharge Planning Reassessment   Provided patient/caregiver education on the expected discharge date and the discharge plan No   Do you have any problems affording any of your prescribed medications? No   Discharge Plan A Skilled Nursing Facility   Discharge Plan B Long-term acute care facility (LTAC)   DME Needed Upon Discharge  none   Anticipated Discharge Disposition SNF   Can the patient/caregiver answer the patient profile reliably? Yes, cognitively intact   How does the patient rate their overall health at the present time? Poor   Describe the patient's ability to walk at the present time. Minor restrictions or changes   How often would a person be available to care for the patient? Often   Number of comorbid conditions (as recorded on the chart) Two   During the past month, has the patient often been bothered by feeling down, depressed or hopeless? No   During the past month, has the patient often been bothered by little interest or pleasure in doing things? No   Post-Acute Status   Post-Acute Authorization Placement   Post-Acute Placement Status Pending Payor Review   Other Status No Post-Acute Service Needs   Discharge Delays None known at this time     Pt declined for LTAC services. According to Ana ([place]), pt's insurance believes pt will benefit from a lower level of care, SNF. Pt is a iv drug user, most SNF's have declined pt's in the past, however, LINN faxed referral to several different SNF's via [facility]. LINN waiting to determine if services will be provided.     LINN contacted [employer] at [phone] to arrange a peer to peer. LINN did not get an answer, LINN, left a detailed message.     2:41PM  LINN received a call from Abi with [employer] to arrange peer to peer. According to Abi, peer to peer could be late today or possibly Monday.

## 2020-02-28 NOTE — NURSING
"Patient given one time insulin order and fluid bolus started as ordered per Dr. Campbell. Patient also notified that MD was notified of Patient refusing vital signs and potassium. Patient decided to take PO potassium at this time. Patient remains unpleasant, angry that he is "gonna get too much insulin".  "

## 2020-02-28 NOTE — PROGRESS NOTES
[facility] Hospital Medicine  Progress Note    Patient Name: Cali Mabry  MRN: [id number]  Patient Class: IP- Inpatient   Admission Date: 2/22/2020  Length of Stay: 6 days  Attending Physician: Kalyani Alberto MD  Primary Care Provider: Primary Doctor No        Subjective:     Principal Problem:Diabetic ketoacidosis without coma associated with diabetes mellitus due to underlying condition        HPI:  54-year-old male past medical history diabetes, hepatitis, hypertension history of polysubstance abuse, pancreatitis presents with abdominal pain for the last 2 days with nausea and vomiting home, and worsening body aches over the last day.  Patient reports he has not had his insulin in some time and reports his glucose is very high.  He states using IV heroin last use few days ago reportedly.  He reports coughing significant amount denies any phlegm production, denies any blood in his sputum, or any chest pain. Denies any blood in her stool, reports normal last bowel movement, reports he is urinating as normal and reports he is extremely thirsty and hungry.  Denies any fevers/chills, no extremity weakness/numbness/tingling.he is in DKA with blood sugar of 635,AG of 26 and CO 2 of 6,he has benen started on DKA protocol,RAMYA with CRT of 1.7,chest  Ray show also sign of pneumonia,patient has been started on broad spectrum IV Abx afterr bload culture is done,    Overview/Hospital Course:  54-year-old male past medical history diabetes, hepatitis, hypertension history of polysubstance abuse,IV heroin,chronic  pancreatitis presents with abdominal pain for the last 2 days with nausea and vomiting home, and worsening body aches over the last day.  Patient reports he has not had his insulin in some time and reports his glucose is very high.  He states using IV heroin last use few days ago reportedly.  He reports coughing significant amount denies any phlegm production, denies any blood in his  sputum, or any chest pain. Denies any blood in her stool, reports normal last bowel movement, reports he is urinating as normal and reports he is extremely thirsty and hungry.  Denies any fevers/chills, no extremity weakness/numbness/tingling.he is in DKA with blood sugar of 635,AG of 26 and CO 2 of 6,he has been  started on DKA protocol,RAMYA with CRT of 1.7,chest  ray show also sign of pneumonia,patient has been started on broad spectrum IV Abx afterr bload culture is done,  AG is closed,started on SQ insulin,started on diet.  Has bacteremia,staph aureus,likely from IV heroin use,alreday on Vanc.repeated blood culture,Echo to R/Oed out  endocarditis.  On Zosyn and azithromycin for pneumonia.  Repeat blood culture no growth,consulted SW for LTAC placement,he is IV drug user,can not go home.reperat blood culture show growth,will place picc line.  Afrberile,no place in LTAC yet.  Podiatry cleaned right heel callus,  Afebrile.    Past Medical History:   Diagnosis Date    Diabetes mellitus     Hepatitis     HTN (hypertension)     Pancreatitis        Past Surgical History:   Procedure Laterality Date    CARDIAC SURGERY  1999    stent placed. ([facility])    SHOULDER SURGERY  2013    right shoulder, spur removal.       Review of patient's allergies indicates:  No Known Allergies    No current facility-administered medications on file prior to encounter.      Current Outpatient Medications on File Prior to Encounter   Medication Sig    atorvastatin (LIPITOR) 40 MG tablet Take 40 mg by mouth once daily.    gabapentin (NEURONTIN) 300 MG capsule Take 300 mg by mouth.    ibuprofen (ADVIL,MOTRIN) 600 MG tablet Take 1 tablet (600 mg total) by mouth every 6 (six) hours as needed for Pain.    insulin aspart U-100 (NOVOLOG) 100 unit/mL (3 mL) InPn pen Inject 10 Units into the skin.    LANTUS 100 unit/mL injection Inject 15 Units into the skin 2 (two) times daily. (Patient taking differently: Inject 30 Units into the  skin once daily. )    naloxone (NARCAN) 1 mg/mL injection 2 mg (1 mg per nostril) by Nasal route as needed for opioid overdose; may repeat in 3 to 5 minutes if not effective. Call 911    naloxone (NARCAN) 4 mg/actuation Spry 4mg by nasal route as needed for opioid overdose; may repeat every 2-3 minutes in alternating nostrils until medical help arrives. Call 911    nicotine (NICODERM CQ) 21 mg/24 hr Place 1 patch onto the skin once daily.    omeprazole (PRILOSEC) 40 MG capsule Take 1 capsule (40 mg total) by mouth once daily.     Family History     Problem Relation (Age of Onset)    Asthma Mother        Tobacco Use    Smoking status: Current Every Day Smoker     Packs/day: 1.00     Years: 20.00     Pack years: 20.00     Types: Cigarettes     Start date: 4/3/1981    Smokeless tobacco: Never Used   Substance and Sexual Activity    Alcohol use: No     Alcohol/week: 0.0 standard drinks    Drug use: Yes     Types: IV, Marijuana, Heroin     Comment: currently, used today, herion    Sexual activity: Yes     Partners: Female     Review of Systems   Constitutional: Positive for activity change and appetite change.   HENT: Negative for congestion and dental problem.    Eyes: Negative for discharge and itching.   Respiratory: Positive for cough. Negative for apnea and chest tightness.    Cardiovascular: Negative for chest pain and leg swelling.   Gastrointestinal: Negative for abdominal pain, nausea and vomiting.   Endocrine: Negative for cold intolerance.   Genitourinary: Negative for difficulty urinating and dysuria.   Musculoskeletal: Negative for arthralgias and back pain.   Skin: Negative for color change and pallor.   Allergic/Immunologic: Negative for environmental allergies and food allergies.   Neurological: Negative for dizziness and facial asymmetry.   Hematological: Does not bruise/bleed easily.   Psychiatric/Behavioral: Negative for agitation.     Objective:     Vital Signs (Most Recent):  Temp: 98.1 °F  (36.7 °C) (02/28/20 0814)  Pulse: (!) 114 (02/28/20 0814)  Resp: 19 (02/28/20 0814)  BP: (!) 151/74 (02/28/20 0814)  SpO2: 96 % (02/28/20 0814) Vital Signs (24h Range):  Temp:  [98.1 °F (36.7 °C)-99.4 °F (37.4 °C)] 98.1 °F (36.7 °C)  Pulse:  [] 114  Resp:  [18-19] 19  SpO2:  [92 %-96 %] 96 %  BP: (110-151)/(64-74) 151/74     Weight: 57.4 kg (126 lb 8.7 oz)  Body mass index is 19.24 kg/m².    Physical Exam   Constitutional: He is oriented to person, place, and time. No distress.   HENT:   Head: Atraumatic.   Eyes: Pupils are equal, round, and reactive to light. EOM are normal.   Neck: Normal range of motion. Neck supple.   Cardiovascular: Normal rate and regular rhythm.   Pulmonary/Chest: Effort normal.   Abdominal: Soft. He exhibits no distension.   Musculoskeletal: Normal range of motion. He exhibits no edema or deformity.   Neurological: He is oriented to person, place, and time. No cranial nerve deficit. Coordination normal.   Skin: Skin is warm and dry.   Psychiatric: He has a normal mood and affect. His behavior is normal.         CRANIAL NERVES     CN III, IV, VI   Pupils are equal, round, and reactive to light.  Extraocular motions are normal.        Significant Labs:   CBC:   Recent Labs   Lab 02/27/20  0603 02/28/20  0634   WBC 10.44 11.64   HGB 9.0* 9.6*   HCT 27.5* 29.9*    354*     CMP:   Recent Labs   Lab 02/27/20  0603 02/27/20  2130 02/28/20  0634     --  133*   K 3.4*  --  3.8     --  99   CO2 29  --  25   GLU 56* 603* 372*   BUN 12  --  13   CREATININE 0.8  --  0.8   CALCIUM 7.8*  --  7.8*   ANIONGAP 6*  --  9   EGFRNONAA >60  --  >60       Significant Imaging: reviewed.      Assessment/Plan:      * Diabetic ketoacidosis without coma associated with diabetes mellitus due to underlying condition  DKA resolved  Now with hypoglycemia  Has received D50, patient eating well  If hypoglycemia persist, will start D51/2NS      MRSA bacteremia  Blood cultures (+) MRSA  Likely from IV  heroin use  Continue Vancomycin  Blood cultures repeated  Echo performed: WNL  Repeat blood culture no growth,consulted SW for LTAC placement,he is IV drug user,can not go home.reperat blood culture show growth, placed picc line.    Afrberile,no place in LTAC yet.  Afebrile.        Pneumonia due to infectious organism  Per clinical presentation and imaging,will continue with IV Abx.      Chronic hepatitis C  Will monitor.      Intravenous drug abuse  Consult on IV heroin cessation use  Patient states that he quit 3-4 weeks ago      Hyperlipidemia  On statin.      Pancreatitis, chronic  Stable at this  time.      Essential hypertension  Stable at this time with no meds  Will monitor throughout hospital course    Type 2 diabetes mellitus, uncontrolled  Currently hypoglycemia  Will monitor blood glucose q.a.c. and HS  Will adjust regimen based on blood glucose trends        VTE Risk Mitigation (From admission, onward)         Ordered     enoxaparin injection 40 mg  Daily      02/22/20 1157     IP VTE HIGH RISK PATIENT  Once      02/22/20 1157     Place DAWIT hose  Until discontinued      02/22/20 1157                      Kalyani Alberto MD  Department of Hospital Medicine   [facility]

## 2020-02-28 NOTE — SUBJECTIVE & OBJECTIVE
Past Medical History:   Diagnosis Date    Diabetes mellitus     Hepatitis     HTN (hypertension)     Pancreatitis        Past Surgical History:   Procedure Laterality Date    CARDIAC SURGERY  1999    stent placed. ([facility])    SHOULDER SURGERY  2013    right shoulder, spur removal.       Review of patient's allergies indicates:  No Known Allergies    No current facility-administered medications on file prior to encounter.      Current Outpatient Medications on File Prior to Encounter   Medication Sig    atorvastatin (LIPITOR) 40 MG tablet Take 40 mg by mouth once daily.    gabapentin (NEURONTIN) 300 MG capsule Take 300 mg by mouth.    ibuprofen (ADVIL,MOTRIN) 600 MG tablet Take 1 tablet (600 mg total) by mouth every 6 (six) hours as needed for Pain.    insulin aspart U-100 (NOVOLOG) 100 unit/mL (3 mL) InPn pen Inject 10 Units into the skin.    LANTUS 100 unit/mL injection Inject 15 Units into the skin 2 (two) times daily. (Patient taking differently: Inject 30 Units into the skin once daily. )    naloxone (NARCAN) 1 mg/mL injection 2 mg (1 mg per nostril) by Nasal route as needed for opioid overdose; may repeat in 3 to 5 minutes if not effective. Call 911    naloxone (NARCAN) 4 mg/actuation Spry 4mg by nasal route as needed for opioid overdose; may repeat every 2-3 minutes in alternating nostrils until medical help arrives. Call 911    nicotine (NICODERM CQ) 21 mg/24 hr Place 1 patch onto the skin once daily.    omeprazole (PRILOSEC) 40 MG capsule Take 1 capsule (40 mg total) by mouth once daily.     Family History     Problem Relation (Age of Onset)    Asthma Mother        Tobacco Use    Smoking status: Current Every Day Smoker     Packs/day: 1.00     Years: 20.00     Pack years: 20.00     Types: Cigarettes     Start date: 4/3/1981    Smokeless tobacco: Never Used   Substance and Sexual Activity    Alcohol use: No     Alcohol/week: 0.0 standard drinks    Drug use: Yes     Types: IV,  Marijuana, Heroin     Comment: currently, used today, herion    Sexual activity: Yes     Partners: Female     Review of Systems   Constitutional: Positive for activity change and appetite change.   HENT: Negative for congestion and dental problem.    Eyes: Negative for discharge and itching.   Respiratory: Positive for cough. Negative for apnea and chest tightness.    Cardiovascular: Negative for chest pain and leg swelling.   Gastrointestinal: Negative for abdominal pain, nausea and vomiting.   Endocrine: Negative for cold intolerance.   Genitourinary: Negative for difficulty urinating and dysuria.   Musculoskeletal: Negative for arthralgias and back pain.   Skin: Negative for color change and pallor.   Allergic/Immunologic: Negative for environmental allergies and food allergies.   Neurological: Negative for dizziness and facial asymmetry.   Hematological: Does not bruise/bleed easily.   Psychiatric/Behavioral: Negative for agitation.     Objective:     Vital Signs (Most Recent):  Temp: 98.1 °F (36.7 °C) (02/28/20 0814)  Pulse: (!) 114 (02/28/20 0814)  Resp: 19 (02/28/20 0814)  BP: (!) 151/74 (02/28/20 0814)  SpO2: 96 % (02/28/20 0814) Vital Signs (24h Range):  Temp:  [98.1 °F (36.7 °C)-99.4 °F (37.4 °C)] 98.1 °F (36.7 °C)  Pulse:  [] 114  Resp:  [18-19] 19  SpO2:  [92 %-96 %] 96 %  BP: (110-151)/(64-74) 151/74     Weight: 57.4 kg (126 lb 8.7 oz)  Body mass index is 19.24 kg/m².    Physical Exam   Constitutional: He is oriented to person, place, and time. No distress.   HENT:   Head: Atraumatic.   Eyes: Pupils are equal, round, and reactive to light. EOM are normal.   Neck: Normal range of motion. Neck supple.   Cardiovascular: Normal rate and regular rhythm.   Pulmonary/Chest: Effort normal.   Abdominal: Soft. He exhibits no distension.   Musculoskeletal: Normal range of motion. He exhibits no edema or deformity.   Neurological: He is oriented to person, place, and time. No cranial nerve deficit.  Coordination normal.   Skin: Skin is warm and dry.   Psychiatric: He has a normal mood and affect. His behavior is normal.         CRANIAL NERVES     CN III, IV, VI   Pupils are equal, round, and reactive to light.  Extraocular motions are normal.        Significant Labs:   CBC:   Recent Labs   Lab 02/27/20  0603 02/28/20  0634   WBC 10.44 11.64   HGB 9.0* 9.6*   HCT 27.5* 29.9*    354*     CMP:   Recent Labs   Lab 02/27/20  0603 02/27/20  2130 02/28/20  0634     --  133*   K 3.4*  --  3.8     --  99   CO2 29  --  25   GLU 56* 603* 372*   BUN 12  --  13   CREATININE 0.8  --  0.8   CALCIUM 7.8*  --  7.8*   ANIONGAP 6*  --  9   EGFRNONAA >60  --  >60       Significant Imaging: reviewed.

## 2020-02-28 NOTE — PROGRESS NOTES
[facility]  Podiatry  Consult Note    Patient Name: Cali Mabry  MRN: [id number]  Admission Date: 2/22/2020  Hospital Length of Stay: 6 days  Attending Physician: Kalyani Alberto MD  Primary Care Provider: Primary Doctor No     Consults  Subjective:     History of Present Illness: 54 y.o male PMH DM2, IVDU  Consulted for right posterior heel wound. Patient reports callus present x one year with dry flaky skin. Reports pain to area. Patient denies IVDU right foot. States he has never injected his feet. Also reports h/o poor circulation followed at  for this.      2/28/20: Patient seen bedside. No new issues.     Scheduled Meds:   atorvastatin  40 mg Oral Daily    cefTRIAXone (ROCEPHIN) IVPB  2 g Intravenous Q24H    cloNIDine  0.1 mg Oral TID    cyclobenzaprine  10 mg Oral TID    dextromethorphan-guaifenesin  mg/5 ml  10 mL Oral Q4H    enoxaparin  40 mg Subcutaneous Daily    gabapentin  300 mg Oral TID    hydrOXYzine pamoate  25 mg Oral Q8H    insulin aspart U-100  5 Units Subcutaneous TIDWM    mupirocin   Nasal BID    nicotine  1 patch Transdermal Daily    pantoprazole  40 mg Oral Daily    potassium phosphate (monobasic)  1,000 mg Oral TID    senna-docusate 8.6-50 mg  1 tablet Oral BID    sodium chloride 0.9%  10 mL Intravenous Q6H    vancomycin (VANCOCIN) IVPB  1,000 mg Intravenous Q12H     Continuous Infusions:  PRN Meds:acetaminophen, acetaminophen, cadexomer iodine, dextrose 50%, dextrose 50%, dextrose 50%, dextrose 50%, glucagon (human recombinant), glucose, glucose, ibuprofen, influenza, insulin aspart U-100, ondansetron, pneumoc 13-molly conj-dip cr(PF), sodium chloride 0.9%, sodium chloride 0.9%, Flushing PICC Protocol **AND** sodium chloride 0.9% **AND** sodium chloride 0.9%, traMADol    Review of patient's allergies indicates:  No Known Allergies     Past Medical History:   Diagnosis Date    Diabetes mellitus     Hepatitis     HTN (hypertension)      Pancreatitis      Past Surgical History:   Procedure Laterality Date    CARDIAC SURGERY  1999    stent [facility]. ([facility])    SHOULDER SURGERY  2013    right shoulder, spur removal.       Family History     Problem Relation (Age of Onset)    Asthma Mother        Tobacco Use    Smoking status: Current Every Day Smoker     Packs/day: 1.00     Years: 20.00     Pack years: 20.00     Types: Cigarettes     Start date: 4/3/1981    Smokeless tobacco: Never Used   Substance and Sexual Activity    Alcohol use: No     Alcohol/week: 0.0 standard drinks    Drug use: Yes     Types: IV, Marijuana, Heroin     Comment: currently, used today, herion    Sexual activity: Yes     Partners: Female     Review of Systems   Constitutional: Positive for activity change.   Respiratory: Negative for shortness of breath.    Cardiovascular: Negative for chest pain and leg swelling.   Gastrointestinal: Negative for nausea and vomiting.   Musculoskeletal: Positive for arthralgias.   Skin: Positive for wound.   Neurological: Positive for numbness. Negative for weakness.     Objective:     Vital Signs (Most Recent):  Temp: 98.1 °F (36.7 °C) (02/28/20 1100)  Pulse: 86 (02/28/20 1100)  Resp: 18 (02/28/20 1100)  BP: 124/75 (02/28/20 1100)  SpO2: (!) 94 % (02/28/20 1100) Vital Signs (24h Range):  Temp:  [98.1 °F (36.7 °C)-99.4 °F (37.4 °C)] 98.1 °F (36.7 °C)  Pulse:  [] 86  Resp:  [18-19] 18  SpO2:  [92 %-96 %] 94 %  BP: (110-151)/(64-75) 124/75     Weight: 57.4 kg (126 lb 8.7 oz)  Body mass index is 19.24 kg/m².    Foot Exam    Right Foot/Ankle     Inspection and Palpation  Tenderness: bony tenderness   Skin Exam: ulcer;     Neurovascular  Dorsalis pedis: absent  Posterior tibial: absent      Left Foot/Ankle      Inspection and Palpation  Skin Exam: ulcer;     Neurovascular  Dorsalis pedis: absent  Posterior tibial: absent          2/28/20                Superficial abrasion right posterior heel with overlying callus. No purulent  drainage noted. Mild erythema to area.     Left leg: anterior leg abrasion with necrotic eschar.     2/26/20      S/p trimming of callus.       Superficial abrasion left anterior leg.           Laboratory:  CBC:   Recent Labs   Lab 02/28/20  0634   WBC 11.64   RBC 3.67*   HGB 9.6*   HCT 29.9*   *   MCV 82   MCH 26.2*   MCHC 32.1     CMP:   Recent Labs   Lab 02/22/20  0540  02/28/20  0634   *   < > 372*   CALCIUM 9.4   < > 7.8*   ALBUMIN 2.6*  --   --    PROT 8.8*  --   --    *   < > 133*   K 5.1   < > 3.8   CO2 7*   < > 25   CL 95   < > 99   BUN 21*   < > 13   CREATININE 1.9*   < > 0.8   ALKPHOS 129  --   --    ALT 8*  --   --    AST 8*  --   --    BILITOT 0.6  --   --     < > = values in this interval not displayed.       Diagnostic Results:  Xray: X-Ray Calcaneus 2 View Right   Order: [id number]   Status:  Final result   Visible to patient:  No (Not Released) Next appt:  None Dx:  Uncontrolled type 2 diabetes mellitus...   Details     Reading Physician Reading Date Result Priority   Demar Mercedes MD 2/27/2020 Routine      Narrative     EXAMINATION:  XR CALCANEUS 2 VIEW RIGHT    CLINICAL HISTORY:  superficial abrasion right posterior heel.; Type 2 diabetes mellitus with hyperglycemia    TECHNIQUE:  Tangential and lateral views of the right calcaneus were performed.    COMPARISON:  None    FINDINGS:  There is normal mineralization.  No acute fractures or dislocations or any erosive processes.  No definite radiographic findings of osteomyelitis.  There is soft tissue swelling over the dorsum of the heel.  No radiopaque foreign bodies in the soft tissues.  There is vascular calcifications.      Impression       As above               PAULA: pending    Clinical Findings:  Superficial abrasion right posterior heel.     Assessment/Plan:     Active Diagnoses:    Diagnosis Date Noted POA    PRINCIPAL PROBLEM:  Diabetic ketoacidosis without coma associated with diabetes mellitus due to underlying  condition [E08.10] 02/22/2020 Yes    MRSA bacteremia [R78.81] 02/23/2020 Yes    Pneumonia due to infectious organism [J18.9] 02/22/2020 Yes    Intravenous drug abuse [F19.10] 11/23/2017 Yes     Chronic    Hyperlipidemia [E78.5] 11/23/2017 Yes     Chronic    Chronic hepatitis C [B18.2] 11/23/2017 Yes     Chronic    Type 2 diabetes mellitus, uncontrolled [E11.65] 04/03/2016 Yes     Chronic    Pancreatitis, chronic [K86.1] 04/03/2016 Yes    Essential hypertension [I10] 04/03/2016 Yes     Chronic      Problems Resolved During this Admission:    Diagnosis Date Noted Date Resolved POA    RAMYA (acute kidney injury) [N17.9] 02/22/2020 02/24/2020 Yes       Xray right calcaneus reviewed negative for foreign body.   PAULA pending, if abnormal recommend vascular surgery consult.   Nursing orders placed for daily wound care.     Podiatry will sign off.       Melva Nice DPM  Podiatry  [facility]

## 2020-02-29 PROBLEM — J96.01 ACUTE RESPIRATORY FAILURE WITH HYPOXIA: Status: ACTIVE | Noted: 2020-02-29

## 2020-02-29 LAB
ANION GAP SERPL CALC-SCNC: 13 MMOL/L (ref 8–16)
BASOPHILS # BLD AUTO: ABNORMAL K/UL (ref 0–0.2)
BASOPHILS NFR BLD: 0 % (ref 0–1.9)
BNP SERPL-MCNC: 161 PG/ML (ref 0–99)
BUN SERPL-MCNC: 12 MG/DL (ref 6–20)
CALCIUM SERPL-MCNC: 7.6 MG/DL (ref 8.7–10.5)
CHLORIDE SERPL-SCNC: 98 MMOL/L (ref 95–110)
CO2 SERPL-SCNC: 22 MMOL/L (ref 23–29)
CREAT SERPL-MCNC: 0.8 MG/DL (ref 0.5–1.4)
DIFFERENTIAL METHOD: ABNORMAL
EOSINOPHIL # BLD AUTO: ABNORMAL K/UL (ref 0–0.5)
EOSINOPHIL NFR BLD: 0 % (ref 0–8)
ERYTHROCYTE [DISTWIDTH] IN BLOOD BY AUTOMATED COUNT: 15.2 % (ref 11.5–14.5)
EST. GFR  (AFRICAN AMERICAN): >60 ML/MIN/1.73 M^2
EST. GFR  (NON AFRICAN AMERICAN): >60 ML/MIN/1.73 M^2
GLUCOSE SERPL-MCNC: 357 MG/DL (ref 70–110)
HCT VFR BLD AUTO: 26.6 % (ref 40–54)
HGB BLD-MCNC: 8.3 G/DL (ref 14–18)
IMM GRANULOCYTES # BLD AUTO: ABNORMAL K/UL (ref 0–0.04)
IMM GRANULOCYTES NFR BLD AUTO: ABNORMAL % (ref 0–0.5)
LYMPHOCYTES # BLD AUTO: ABNORMAL K/UL (ref 1–4.8)
LYMPHOCYTES NFR BLD: 9 % (ref 18–48)
MCH RBC QN AUTO: 25.4 PG (ref 27–31)
MCHC RBC AUTO-ENTMCNC: 31.2 G/DL (ref 32–36)
MCV RBC AUTO: 81 FL (ref 82–98)
MONOCYTES # BLD AUTO: ABNORMAL K/UL (ref 0.3–1)
MONOCYTES NFR BLD: 7 % (ref 4–15)
MYELOCYTES NFR BLD MANUAL: 1 %
NEUTROPHILS # BLD AUTO: ABNORMAL K/UL (ref 1.8–7.7)
NEUTROPHILS NFR BLD: 79 % (ref 38–73)
NEUTS BAND NFR BLD MANUAL: 4 %
NRBC BLD-RTO: 0 /100 WBC
PHOSPHATE SERPL-MCNC: 2.8 MG/DL (ref 2.7–4.5)
PLATELET # BLD AUTO: 334 K/UL (ref 150–350)
PMV BLD AUTO: 10.2 FL (ref 9.2–12.9)
POCT GLUCOSE: 180 MG/DL (ref 70–110)
POCT GLUCOSE: 260 MG/DL (ref 70–110)
POCT GLUCOSE: 319 MG/DL (ref 70–110)
POCT GLUCOSE: 335 MG/DL (ref 70–110)
POCT GLUCOSE: 459 MG/DL (ref 70–110)
POTASSIUM SERPL-SCNC: 3.9 MMOL/L (ref 3.5–5.1)
RBC # BLD AUTO: 3.27 M/UL (ref 4.6–6.2)
SODIUM SERPL-SCNC: 133 MMOL/L (ref 136–145)
VANCOMYCIN SERPL-MCNC: 11.3 UG/ML
WBC # BLD AUTO: 12.03 K/UL (ref 3.9–12.7)

## 2020-02-29 PROCEDURE — 85007 BL SMEAR W/DIFF WBC COUNT: CPT

## 2020-02-29 PROCEDURE — 93005 ELECTROCARDIOGRAM TRACING: CPT

## 2020-02-29 PROCEDURE — 99900035 HC TECH TIME PER 15 MIN (STAT)

## 2020-02-29 PROCEDURE — 11000001 HC ACUTE MED/SURG PRIVATE ROOM

## 2020-02-29 PROCEDURE — 94799 UNLISTED PULMONARY SVC/PX: CPT

## 2020-02-29 PROCEDURE — 25000003 PHARM REV CODE 250: Performed by: HOSPITALIST

## 2020-02-29 PROCEDURE — 27000221 HC OXYGEN, UP TO 24 HOURS

## 2020-02-29 PROCEDURE — 97535 SELF CARE MNGMENT TRAINING: CPT

## 2020-02-29 PROCEDURE — 94761 N-INVAS EAR/PLS OXIMETRY MLT: CPT

## 2020-02-29 PROCEDURE — 63600175 PHARM REV CODE 636 W HCPCS: Performed by: HOSPITALIST

## 2020-02-29 PROCEDURE — 25000003 PHARM REV CODE 250: Performed by: INTERNAL MEDICINE

## 2020-02-29 PROCEDURE — 83880 ASSAY OF NATRIURETIC PEPTIDE: CPT

## 2020-02-29 PROCEDURE — 84100 ASSAY OF PHOSPHORUS: CPT

## 2020-02-29 PROCEDURE — 85027 COMPLETE CBC AUTOMATED: CPT

## 2020-02-29 PROCEDURE — 80202 ASSAY OF VANCOMYCIN: CPT

## 2020-02-29 PROCEDURE — 93010 ELECTROCARDIOGRAM REPORT: CPT | Mod: ,,, | Performed by: INTERNAL MEDICINE

## 2020-02-29 PROCEDURE — 92610 EVALUATE SWALLOWING FUNCTION: CPT

## 2020-02-29 PROCEDURE — 93010 EKG 12-LEAD: ICD-10-PCS | Mod: ,,, | Performed by: INTERNAL MEDICINE

## 2020-02-29 PROCEDURE — C9399 UNCLASSIFIED DRUGS OR BIOLOG: HCPCS | Performed by: HOSPITALIST

## 2020-02-29 PROCEDURE — 80048 BASIC METABOLIC PNL TOTAL CA: CPT

## 2020-02-29 PROCEDURE — A4216 STERILE WATER/SALINE, 10 ML: HCPCS | Performed by: HOSPITALIST

## 2020-02-29 RX ORDER — FUROSEMIDE 40 MG/1
40 TABLET ORAL DAILY
Status: DISCONTINUED | OUTPATIENT
Start: 2020-02-29 | End: 2020-03-08 | Stop reason: HOSPADM

## 2020-02-29 RX ADMIN — VANCOMYCIN HYDROCHLORIDE 1000 MG: 1 INJECTION, POWDER, LYOPHILIZED, FOR SOLUTION INTRAVENOUS at 05:02

## 2020-02-29 RX ADMIN — Medication 10 ML: at 12:02

## 2020-02-29 RX ADMIN — GUAIFENESIN AND DEXTROMETHORPHAN 10 ML: 100; 10 SYRUP ORAL at 08:02

## 2020-02-29 RX ADMIN — INSULIN ASPART 5 UNITS: 100 INJECTION, SOLUTION INTRAVENOUS; SUBCUTANEOUS at 12:02

## 2020-02-29 RX ADMIN — INSULIN ASPART 5 UNITS: 100 INJECTION, SOLUTION INTRAVENOUS; SUBCUTANEOUS at 05:02

## 2020-02-29 RX ADMIN — PIPERACILLIN AND TAZOBACTAM 4.5 G: 4; .5 INJECTION, POWDER, FOR SOLUTION INTRAVENOUS at 11:02

## 2020-02-29 RX ADMIN — TRAMADOL HYDROCHLORIDE 50 MG: 50 TABLET ORAL at 08:02

## 2020-02-29 RX ADMIN — INSULIN ASPART 6 UNITS: 100 INJECTION, SOLUTION INTRAVENOUS; SUBCUTANEOUS at 05:02

## 2020-02-29 RX ADMIN — CLONIDINE HYDROCHLORIDE 0.1 MG: 0.1 TABLET ORAL at 08:02

## 2020-02-29 RX ADMIN — PANTOPRAZOLE SODIUM 40 MG: 40 TABLET, DELAYED RELEASE ORAL at 08:02

## 2020-02-29 RX ADMIN — Medication: at 05:02

## 2020-02-29 RX ADMIN — POTASSIUM PHOSPHATE, MONOBASIC 1000 MG: 500 TABLET, SOLUBLE ORAL at 04:02

## 2020-02-29 RX ADMIN — GABAPENTIN 300 MG: 300 CAPSULE ORAL at 08:02

## 2020-02-29 RX ADMIN — FUROSEMIDE 40 MG: 40 TABLET ORAL at 11:02

## 2020-02-29 RX ADMIN — INSULIN ASPART 10 UNITS: 100 INJECTION, SOLUTION INTRAVENOUS; SUBCUTANEOUS at 08:02

## 2020-02-29 RX ADMIN — POTASSIUM PHOSPHATE, MONOBASIC 1000 MG: 500 TABLET, SOLUBLE ORAL at 08:02

## 2020-02-29 RX ADMIN — HYDROXYZINE PAMOATE 25 MG: 25 CAPSULE ORAL at 04:02

## 2020-02-29 RX ADMIN — ACETAMINOPHEN 650 MG: 325 TABLET ORAL at 04:02

## 2020-02-29 RX ADMIN — ENOXAPARIN SODIUM 40 MG: 100 INJECTION SUBCUTANEOUS at 04:02

## 2020-02-29 RX ADMIN — INSULIN ASPART 1 UNITS: 100 INJECTION, SOLUTION INTRAVENOUS; SUBCUTANEOUS at 08:02

## 2020-02-29 RX ADMIN — MUPIROCIN: 20 OINTMENT TOPICAL at 08:02

## 2020-02-29 RX ADMIN — INSULIN DETEMIR 20 UNITS: 100 INJECTION, SOLUTION SUBCUTANEOUS at 08:02

## 2020-02-29 RX ADMIN — Medication 10 ML: at 05:02

## 2020-02-29 RX ADMIN — ATORVASTATIN CALCIUM 40 MG: 40 TABLET, FILM COATED ORAL at 08:02

## 2020-02-29 RX ADMIN — GUAIFENESIN AND DEXTROMETHORPHAN 10 ML: 100; 10 SYRUP ORAL at 04:02

## 2020-02-29 RX ADMIN — GABAPENTIN 300 MG: 300 CAPSULE ORAL at 04:02

## 2020-02-29 RX ADMIN — INSULIN ASPART 8 UNITS: 100 INJECTION, SOLUTION INTRAVENOUS; SUBCUTANEOUS at 12:02

## 2020-02-29 RX ADMIN — INSULIN ASPART 5 UNITS: 100 INJECTION, SOLUTION INTRAVENOUS; SUBCUTANEOUS at 08:02

## 2020-02-29 RX ADMIN — PIPERACILLIN AND TAZOBACTAM 4.5 G: 4; .5 INJECTION, POWDER, FOR SOLUTION INTRAVENOUS at 07:02

## 2020-02-29 RX ADMIN — GUAIFENESIN AND DEXTROMETHORPHAN 10 ML: 100; 10 SYRUP ORAL at 02:02

## 2020-02-29 NOTE — NURSING
Dr. Campbell notified of elevated MUSE scores due to vital signs. Patient abed resting quietly with no s/sx distress noted. No new orders at this time.

## 2020-02-29 NOTE — PROGRESS NOTES
[facility] Hospital Medicine  Progress Note    Patient Name: Cali Mabry  MRN: [id number]  Patient Class: IP- Inpatient   Admission Date: 2/22/2020  Length of Stay: 7 days  Attending Physician: Kalyani Alberto MD  Primary Care Provider: Primary Doctor No        Subjective:     Principal Problem:Diabetic ketoacidosis without coma associated with diabetes mellitus due to underlying condition        HPI:  54-year-old male past medical history diabetes, hepatitis, hypertension history of polysubstance abuse, pancreatitis presents with abdominal pain for the last 2 days with nausea and vomiting home, and worsening body aches over the last day.  Patient reports he has not had his insulin in some time and reports his glucose is very high.  He states using IV heroin last use few days ago reportedly.  He reports coughing significant amount denies any phlegm production, denies any blood in his sputum, or any chest pain. Denies any blood in her stool, reports normal last bowel movement, reports he is urinating as normal and reports he is extremely thirsty and hungry.  Denies any fevers/chills, no extremity weakness/numbness/tingling.he is in DKA with blood sugar of 635,AG of 26 and CO 2 of 6,he has benen started on DKA protocol,RAMYA with CRT of 1.7,chest  Ray show also sign of pneumonia,patient has been started on broad spectrum IV Abx afterr bload culture is done,    Overview/Hospital Course:  54-year-old male past medical history diabetes, hepatitis, hypertension history of polysubstance abuse,IV heroin,chronic  pancreatitis presents with abdominal pain for the last 2 days with nausea and vomiting home, and worsening body aches over the last day.  Patient reports he has not had his insulin in some time and reports his glucose is very high.  He states using IV heroin last use few days ago reportedly.  He reports coughing significant amount denies any phlegm production, denies any blood in his  sputum, or any chest pain. Denies any blood in her stool, reports normal last bowel movement, reports he is urinating as normal and reports he is extremely thirsty and hungry.  Denies any fevers/chills, no extremity weakness/numbness/tingling.he is in DKA with blood sugar of 635,AG of 26 and CO 2 of 6,he has been  started on DKA protocol,RAMYA with CRT of 1.7,chest  ray show also sign of pneumonia,patient has been started on broad spectrum IV Abx afterr bload culture is done,  AG is closed,started on SQ insulin,started on diet.  Has bacteremia,staph aureus,likely from IV heroin use,alreday on Vanc.repeated blood culture,Echo to R/Oed out  endocarditis.  On Zosyn and azithromycin for pneumonia.  Repeat blood culture no growth,consulted SW for LTAC placement,he is IV drug user,can not go home.reperat blood culture show growth,will place picc line.  Afrberile,no place in LTAC yet.  Podiatry cleaned right heel callus,  Afebrile.  Require more oxygen supplement,chest X ray show possible aspiration,adjusted Abx,chnaged Rocephin to zosyn,added IS,aspiration precaution,consulted ST.    Past Medical History:   Diagnosis Date    Diabetes mellitus     Hepatitis     HTN (hypertension)     Pancreatitis        Past Surgical History:   Procedure Laterality Date    CARDIAC SURGERY  1999    stent placed. ([facility])    SHOULDER SURGERY  2013    right shoulder, spur removal.       Review of patient's allergies indicates:  No Known Allergies    No current facility-administered medications on file prior to encounter.      Current Outpatient Medications on File Prior to Encounter   Medication Sig    atorvastatin (LIPITOR) 40 MG tablet Take 40 mg by mouth once daily.    gabapentin (NEURONTIN) 300 MG capsule Take 300 mg by mouth.    ibuprofen (ADVIL,MOTRIN) 600 MG tablet Take 1 tablet (600 mg total) by mouth every 6 (six) hours as needed for Pain.    insulin aspart U-100 (NOVOLOG) 100 unit/mL (3 mL) InPn pen Inject 10 Units  into the skin.    LANTUS 100 unit/mL injection Inject 15 Units into the skin 2 (two) times daily. (Patient taking differently: Inject 30 Units into the skin once daily. )    naloxone (NARCAN) 1 mg/mL injection 2 mg (1 mg per nostril) by Nasal route as needed for opioid overdose; may repeat in 3 to 5 minutes if not effective. Call 911    naloxone (NARCAN) 4 mg/actuation Spry 4mg by nasal route as needed for opioid overdose; may repeat every 2-3 minutes in alternating nostrils until medical help arrives. Call 911    nicotine (NICODERM CQ) 21 mg/24 hr Place 1 patch onto the skin once daily.    omeprazole (PRILOSEC) 40 MG capsule Take 1 capsule (40 mg total) by mouth once daily.     Family History     Problem Relation (Age of Onset)    Asthma Mother        Tobacco Use    Smoking status: Current Every Day Smoker     Packs/day: 1.00     Years: 20.00     Pack years: 20.00     Types: Cigarettes     Start date: 4/3/1981    Smokeless tobacco: Never Used   Substance and Sexual Activity    Alcohol use: No     Alcohol/week: 0.0 standard drinks    Drug use: Yes     Types: IV, Marijuana, Heroin     Comment: currently, used today, herion    Sexual activity: Yes     Partners: Female     Review of Systems   Constitutional: Positive for activity change and appetite change.   HENT: Negative for congestion and dental problem.    Eyes: Negative for discharge and itching.   Respiratory: Positive for cough. Negative for apnea and chest tightness.    Cardiovascular: Negative for chest pain and leg swelling.   Gastrointestinal: Negative for abdominal pain, nausea and vomiting.   Endocrine: Negative for cold intolerance.   Genitourinary: Negative for difficulty urinating and dysuria.   Musculoskeletal: Negative for arthralgias and back pain.   Skin: Negative for color change and pallor.   Allergic/Immunologic: Negative for environmental allergies and food allergies.   Neurological: Negative for dizziness and facial asymmetry.    Hematological: Does not bruise/bleed easily.   Psychiatric/Behavioral: Negative for agitation.     Objective:     Vital Signs (Most Recent):  Temp: 98 °F (36.7 °C) (02/29/20 0732)  Pulse: (!) 116 (02/29/20 0935)  Resp: (!) 28 (02/29/20 0935)  BP: 137/84 (02/29/20 0732)  SpO2: 95 % (02/29/20 0935) Vital Signs (24h Range):  Temp:  [98 °F (36.7 °C)-103.1 °F (39.5 °C)] 98 °F (36.7 °C)  Pulse:  [] 116  Resp:  [17-32] 28  SpO2:  [87 %-96 %] 95 %  BP: (102-161)/(50-84) 137/84     Weight: 57.4 kg (126 lb 8.7 oz)  Body mass index is 19.24 kg/m².    Physical Exam   Constitutional: He is oriented to person, place, and time. No distress.   HENT:   Head: Atraumatic.   Eyes: Pupils are equal, round, and reactive to light. EOM are normal.   Neck: Normal range of motion. Neck supple.   Cardiovascular: Normal rate and regular rhythm.   Pulmonary/Chest: Effort normal.   Abdominal: Soft. He exhibits no distension.   Musculoskeletal: Normal range of motion. He exhibits no edema or deformity.   Neurological: He is oriented to person, place, and time. No cranial nerve deficit. Coordination normal.   Skin: Skin is warm and dry.   Psychiatric: He has a normal mood and affect. His behavior is normal.         CRANIAL NERVES     CN III, IV, VI   Pupils are equal, round, and reactive to light.  Extraocular motions are normal.        Significant Labs:   CBC:   Recent Labs   Lab 02/28/20  0634 02/29/20  0525   WBC 11.64 12.03   HGB 9.6* 8.3*   HCT 29.9* 26.6*   * 334     CMP:   Recent Labs   Lab 02/27/20  2130 02/28/20  0634 02/29/20  0525   NA  --  133* 133*   K  --  3.8 3.9   CL  --  99 98   CO2  --  25 22*   * 372* 357*   BUN  --  13 12   CREATININE  --  0.8 0.8   CALCIUM  --  7.8* 7.6*   ANIONGAP  --  9 13   EGFRNONAA  --  >60 >60       Significant Imaging: reviewed.      Assessment/Plan:      * Diabetic ketoacidosis without coma associated with diabetes mellitus due to underlying condition  DKA resolved  Now with  hypoglycemia  Has received D50, patient eating well  If hypoglycemia persist, will start D51/2NS      Acute respiratory failure with hypoxia  Duo to pneumonia,Require more oxygen supplement,chest X ray show possible aspiration,adjusted Abx,chnaged Rocephin to zosyn,added IS,aspiration precaution,consulted ST.      MRSA bacteremia  Blood cultures (+) MRSA  Likely from IV heroin use  Continue Vancomycin  Blood cultures repeated  Echo performed: WNL  Repeat blood culture no growth,consulted SW for LTAC placement,he is IV drug user,can not go home.reperat blood culture show growth, placed picc line.    Afrberile,no place in LTAC yet.  Afebrile.        Pneumonia due to infectious organism  Per clinical presentation and imaging,will continue with IV Abx.      Chronic hepatitis C  Will monitor.      Intravenous drug abuse  Consult on IV heroin cessation use  Patient states that he quit 3-4 weeks ago      Hyperlipidemia  On statin.      Pancreatitis, chronic  Stable at this  time.      Essential hypertension  Stable at this time with no meds  Will monitor throughout hospital course    Type 2 diabetes mellitus, uncontrolled  Currently hypoglycemia  Will monitor blood glucose q.a.c. and HS  Will adjust regimen based on blood glucose trends        VTE Risk Mitigation (From admission, onward)         Ordered     enoxaparin injection 40 mg  Daily      02/22/20 1157     IP VTE HIGH RISK PATIENT  Once      02/22/20 1157     Place DAWIT hose  Until discontinued      02/22/20 1157                      Kalyani Alberto MD  Department of Hospital Medicine   [facility]

## 2020-02-29 NOTE — PROGRESS NOTES
Pharmacokinetic Initial Assessment: IV Vancomycin    Assessment/Plan:    Patient currently established on Vancomycin 1000 mg IV every 12 hours. New order for pharmacy to dose/consult.  Level obtained on 2/29/20 of 11.3 is within target range of 10 to 20 mcg/ml  Draw next vancomycin trough level 30 min prior to 4th dose on 3/1/20 at approximately 17:30    Pharmacy will continue to follow and monitor vancomycin.      Please contact pharmacy at extension [phone] with any questions regarding this assessment.     Thank you for the consult,   Princess Dc       Patient brief summary:  Cali Mabry is a 54 y.o. male initiated on antimicrobial therapy with IV Vancomycin for treatment of suspected lower respiratory infection/pneumonia    Drug Allergies:   Review of patient's allergies indicates:  No Known Allergies    Actual Body Weight:   57.4 kg    Renal Function:   Estimated Creatinine Clearance: 86.7 mL/min (based on SCr of 0.8 mg/dL).,     Dialysis Method (if applicable):  N/A    CBC (last 72 hours):  Recent Labs   Lab Result Units 02/27/20  0603 02/28/20  0634 02/29/20  0525   WBC K/uL 10.44 11.64 12.03   Hemoglobin g/dL 9.0* 9.6* 8.3*   Hematocrit % 27.5* 29.9* 26.6*   Platelets K/uL 277 354* 334   Gran% % 55.0 62.0 75.3*   Lymph% % 24.0 15.0* 10.3*   Mono% % 9.0 6.0 6.4   Eosinophil% % 0.0 0.0 0.7   Basophil% % 0.0 0.0 0.2   Differential Method  Manual Manual Automated       Metabolic Panel (last 72 hours):  Recent Labs   Lab Result Units 02/27/20  0603 02/27/20  2130 02/28/20  0634 02/29/20  0525   Sodium mmol/L 136  --  133* 133*   Potassium mmol/L 3.4*  --  3.8 3.9   Chloride mmol/L 101  --  99 98   CO2 mmol/L 29  --  25 22*   Glucose mg/dL 56* 603* 372* 357*   BUN, Bld mg/dL 12  --  13 12   Creatinine mg/dL 0.8  --  0.8 0.8   Phosphorus mg/dL 2.7  --  2.7 2.8       Drug levels (last 3 results):  Recent Labs   Lab Result Units 02/29/20  0525   Vancomycin, Random ug/mL 11.3       Microbiologic  Results:  Microbiology Results (last 7 days)       Procedure Component Value Units Date/Time    Blood culture [[id number]] Collected:  02/23/20 1139    Order Status:  Completed Specimen:  Blood Updated:  02/28/20 1303     Blood Culture, Routine No growth after 5 days.    Blood culture [[id number]] Collected:  02/23/20 1140    Order Status:  Completed Specimen:  Blood Updated:  02/28/20 1303     Blood Culture, Routine No growth after 5 days.    Blood Culture #1 **CANNOT BE ORDERED STAT** [[id number]] Collected:  02/22/20 0540    Order Status:  Completed Specimen:  Blood from Line, Jugular, External Right Updated:  02/27/20 0703     Blood Culture, Routine No growth after 5 days.    Culture, Respiratory with Gram Stain [[id number]]  (Abnormal)  (Susceptibility) Collected:  02/23/20 0200    Order Status:  Completed Specimen:  Respiratory from Sputum, Expectorated Updated:  02/25/20 1011     Respiratory Culture ESCHERICHIA COLI  Many        METHICILLIN RESISTANT STAPHYLOCOCCUS AUREUS  Many       Gram Stain (Respiratory) <10 epithelial cells per low power field.     Gram Stain (Respiratory) Moderate WBC's     Gram Stain (Respiratory) Moderate Gram positive cocci in clusters     Gram Stain (Respiratory) Few Gram positive rods    Blood Culture #2 **CANNOT BE ORDERED STAT** [[id number]]  (Abnormal)  (Susceptibility) Collected:  02/22/20 0600    Order Status:  Completed Specimen:  Blood from Peripheral, Antecubital, Left Updated:  02/24/20 0752     Blood Culture, Routine Gram stain edison bottle: Gram positive cocci in clusters resembling Staph       02/22/2020  22:04        Results called to and read back by: BRITT FRAUSTO/[id number]      Gram stain edison bottle: Gram positive cocci in clusters resembling Staph       Positive results previously called      METHICILLIN RESISTANT STAPHYLOCOCCUS AUREUS  Called to Niharika Ibanez- 4W  02/24/2020  07:52      Culture, Respiratory with Gram Stain [[id number]]     Order Status:  Canceled Specimen:   Respiratory

## 2020-02-29 NOTE — ASSESSMENT & PLAN NOTE
Duo to pneumonia,Require more oxygen supplement,chest X ray show possible aspiration,adjusted Abx,chnaged Rocephin to zosyn,added IS,aspiration precaution,consulted

## 2020-02-29 NOTE — NURSING
Patient having increased respirations of 28, SPO2% was 89% on room air and respirations were 28.  Patient's O2 increased to 4L per minute via nasal cannula and SPO2% increased to 94%. MD Dr. Henderson notified. New orders received for STAT chest x-ray.

## 2020-02-29 NOTE — NURSING
Patient is A&Ox3 with some episodes of confusion patient has Right upper arm double lumen PICC and makes needs known. Patient remained free from falls during this shift. Patient has been getting IV antibiotics,  And is on O2 via nasal cannula at 2L per minute with humidification. VSS. Patient given PRN pain medication upon request. No acute distress noted at this time.  Patient has been cooperative throughout this shift but has refused his incentive spirometer has asked me to come back later when asked to demonstrate proper performance after educating administration instruction Bed in low position, bed alarm on, call bell within reach.

## 2020-02-29 NOTE — NURSING
"Patient abed and sleeping most of shift - refuses care at times, uncooperative, doesn't "want to be bothered". Refused a.m. Meds.. PRN tylenol given this shift for elevated temp..  "

## 2020-02-29 NOTE — PT/OT/SLP EVAL
Speech Language Pathology Evaluation  Bedside Swallow    Patient Name:  Cali Mabry   MRN:  [id number]  Admitting Diagnosis: Diabetic ketoacidosis without coma associated with diabetes mellitus due to underlying condition    Recommendations:                 General Recommendations:  Dysphagia therapy  Diet recommendations:  Puree, Thin   Aspiration Precautions: Alternating bites/sips, HOB to 90 degrees, Meds whole 1 at a time, Monitor for s/s of aspiration, Small bites/sips and Standard aspiration precautions   General Precautions: Standard, pureed diet  Communication strategies:  none    History:     Past Medical History:   Diagnosis Date    Diabetes mellitus     Hepatitis     HTN (hypertension)     Pancreatitis        Past Surgical History:   Procedure Laterality Date    CARDIAC SURGERY  1999    stent placed. ([facility])    SHOULDER SURGERY  2013    right shoulder, spur removal.         Chest X-Rays: Patchy airspace opacities in bilateral lung bases were noted as unchanged on 2/29/20 compared to findings on 2/22/20. No pneumothorax visualized.     Subjective   Pt alert and in bed. He agreed to participation in PO trials to identify least restrictive diet and liquid level. Pt stated that he has been taking meds without coughing/choking but has been having difficulty his current diet, stating that he requested soft food.       Objective:     Oral Musculature Evaluation  · Oral Musculature: WFL  · Dentition: edentulous  · Secretion Management: adequate  · Mucosal Quality: adequate  · Mandibular Strength and Mobility: WFL  · Oral Labial Strength and Mobility: WFL  · Lingual Strength and Mobility: functional strength  · Velar Elevation: WFL  · Buccal Strength and Mobility: WFL  · Volitional Swallow: Adequate   · Voice Prior to PO Intake: Clear  · Oral Musculature Comments: WFL    Bedside Swallow Eval:   Consistencies Assessed:  · Thin liquids 4oz via straw   · Puree x3  · Soft solids x2     Oral Phase:    · WFL for TL and puree trials   · Prolonged mastication of soft solid bolus x2   · Orally expelled soft solid bolus x1  · Absent A-P transport     Pharyngeal Phase:   · no overt clinical signs/symptoms of pharyngeal dysphagia    Compensatory Strategies  · None    Treatment: Pt HOB adjusted prior to initiation of swallow evaluation. He was assessed with TL via straw, puree, and soft solid trials without overt s/s of aspiration. With soft solid trials patient demonstrated prolonged mastication, yet made no attempts to swallow. Rotary chew was present and functional, however pt required verbal cue to expel cohesive bolus. Pt stated that he wears dentures on occasion; he does not  have them here at the hospital. Pt educated on need for diet modification from [facility] soft to puree. He stated that he was agreeable to the change and that he preferred soft food. ST will follow for diet tolerance and possible diet advancement to [facility] soft.      Assessment:     Cali Mabry is a 54 y.o. male with a medical diagnosis of Diabetic ketoacidosis without coma associated with diabetes mellitus due to underlying condition. He presents with mild oral dysphagia c/b by prolonged mastication, absent A-P transport/deglutition of soft solids.     Goals:   Multidisciplinary Problems     SLP Goals        Problem: SLP Goal    Goal Priority Disciplines Outcome   SLP Goal     SLP Ongoing, Progressing   Description:  1. Pt will tolerate puree diet and TL without overt s/s of aspiration.   2. Pt will demonstrate efficient A-P transport/deglutition of soft solid item x3 sessions.                      Plan:     · Patient to be seen:  3 x/week   · Plan of Care expires:  03/13/20  · Plan of Care reviewed with:      · SLP Follow-Up:  Yes       Discharge recommendations:  (TBD)   Barriers to Discharge:  None    Time Tracking:     SLP Treatment Date:   02/29/20  Speech Start Time:  1310  Speech Stop Time:  1330     Speech Total Time (min):  20  min    Billable Minutes: Eval Swallow and Oral Function 10min and Seld Care/Home Management Training 10min    Emerita Michaud CF-SLP  02/29/2020

## 2020-02-29 NOTE — PLAN OF CARE
Problem: Adult Inpatient Plan of Care  Goal: Plan of Care Review  Outcome: Ongoing, Progressing  Flowsheets (Taken 2/29/2020 0355)  Plan of Care Reviewed With: patient  Goal: Optimal Comfort and Wellbeing  Outcome: Ongoing, Progressing  Intervention: Provide Person-Centered Care  Flowsheets (Taken 2/29/2020 0355)  Trust Relationship/Rapport: care explained     Problem: Diabetes Comorbidity  Goal: Blood Glucose Level Within Desired Range  Outcome: Ongoing, Progressing  Intervention: Maintain Glycemic Control  Flowsheets (Taken 2/29/2020 0355)  Glycemic Management: blood glucose monitoring; supplemental insulin given     Problem: Fall Injury Risk  Goal: Absence of Fall and Fall-Related Injury  Outcome: Ongoing, Progressing  Intervention: Identify and Manage Contributors to Fall Injury Risk  Flowsheets (Taken 2/29/2020 0355)  Self-Care Promotion: independence encouraged; BADL personal routines maintained; BADL personal objects within reach  Medication Review/Management: medications reviewed  Intervention: Promote Injury-Free Environment  Flowsheets (Taken 2/29/2020 0355)  Safety Promotion/Fall Prevention: assistive device/personal item within reach; bed alarm set; commode/urinal/bedpan at bedside; Fall Risk reviewed with patient/family; lighting adjusted; medications reviewed; nonskid shoes/socks when out of bed; room near unit station; /camera at bedside; side rails raised x 2; instructed to call staff for mobility  Environmental Safety Modification: assistive device/personal items within reach; clutter free environment maintained; lighting adjusted; room near unit station; room organization consistent

## 2020-02-29 NOTE — SUBJECTIVE & OBJECTIVE
Past Medical History:   Diagnosis Date    Diabetes mellitus     Hepatitis     HTN (hypertension)     Pancreatitis        Past Surgical History:   Procedure Laterality Date    CARDIAC SURGERY  1999    stent placed. ([facility])    SHOULDER SURGERY  2013    right shoulder, spur removal.       Review of patient's allergies indicates:  No Known Allergies    No current facility-administered medications on file prior to encounter.      Current Outpatient Medications on File Prior to Encounter   Medication Sig    atorvastatin (LIPITOR) 40 MG tablet Take 40 mg by mouth once daily.    gabapentin (NEURONTIN) 300 MG capsule Take 300 mg by mouth.    ibuprofen (ADVIL,MOTRIN) 600 MG tablet Take 1 tablet (600 mg total) by mouth every 6 (six) hours as needed for Pain.    insulin aspart U-100 (NOVOLOG) 100 unit/mL (3 mL) InPn pen Inject 10 Units into the skin.    LANTUS 100 unit/mL injection Inject 15 Units into the skin 2 (two) times daily. (Patient taking differently: Inject 30 Units into the skin once daily. )    naloxone (NARCAN) 1 mg/mL injection 2 mg (1 mg per nostril) by Nasal route as needed for opioid overdose; may repeat in 3 to 5 minutes if not effective. Call 911    naloxone (NARCAN) 4 mg/actuation Spry 4mg by nasal route as needed for opioid overdose; may repeat every 2-3 minutes in alternating nostrils until medical help arrives. Call 911    nicotine (NICODERM CQ) 21 mg/24 hr Place 1 patch onto the skin once daily.    omeprazole (PRILOSEC) 40 MG capsule Take 1 capsule (40 mg total) by mouth once daily.     Family History     Problem Relation (Age of Onset)    Asthma Mother        Tobacco Use    Smoking status: Current Every Day Smoker     Packs/day: 1.00     Years: 20.00     Pack years: 20.00     Types: Cigarettes     Start date: 4/3/1981    Smokeless tobacco: Never Used   Substance and Sexual Activity    Alcohol use: No     Alcohol/week: 0.0 standard drinks    Drug use: Yes     Types: IV,  Marijuana, Heroin     Comment: currently, used today, herion    Sexual activity: Yes     Partners: Female     Review of Systems   Constitutional: Positive for activity change and appetite change.   HENT: Negative for congestion and dental problem.    Eyes: Negative for discharge and itching.   Respiratory: Positive for cough. Negative for apnea and chest tightness.    Cardiovascular: Negative for chest pain and leg swelling.   Gastrointestinal: Negative for abdominal pain, nausea and vomiting.   Endocrine: Negative for cold intolerance.   Genitourinary: Negative for difficulty urinating and dysuria.   Musculoskeletal: Negative for arthralgias and back pain.   Skin: Negative for color change and pallor.   Allergic/Immunologic: Negative for environmental allergies and food allergies.   Neurological: Negative for dizziness and facial asymmetry.   Hematological: Does not bruise/bleed easily.   Psychiatric/Behavioral: Negative for agitation.     Objective:     Vital Signs (Most Recent):  Temp: 98 °F (36.7 °C) (02/29/20 0732)  Pulse: (!) 116 (02/29/20 0935)  Resp: (!) 28 (02/29/20 0935)  BP: 137/84 (02/29/20 0732)  SpO2: 95 % (02/29/20 0935) Vital Signs (24h Range):  Temp:  [98 °F (36.7 °C)-103.1 °F (39.5 °C)] 98 °F (36.7 °C)  Pulse:  [] 116  Resp:  [17-32] 28  SpO2:  [87 %-96 %] 95 %  BP: (102-161)/(50-84) 137/84     Weight: 57.4 kg (126 lb 8.7 oz)  Body mass index is 19.24 kg/m².    Physical Exam   Constitutional: He is oriented to person, place, and time. No distress.   HENT:   Head: Atraumatic.   Eyes: Pupils are equal, round, and reactive to light. EOM are normal.   Neck: Normal range of motion. Neck supple.   Cardiovascular: Normal rate and regular rhythm.   Pulmonary/Chest: Effort normal.   Abdominal: Soft. He exhibits no distension.   Musculoskeletal: Normal range of motion. He exhibits no edema or deformity.   Neurological: He is oriented to person, place, and time. No cranial nerve deficit. Coordination  normal.   Skin: Skin is warm and dry.   Psychiatric: He has a normal mood and affect. His behavior is normal.         CRANIAL NERVES     CN III, IV, VI   Pupils are equal, round, and reactive to light.  Extraocular motions are normal.        Significant Labs:   CBC:   Recent Labs   Lab 02/28/20  0634 02/29/20  0525   WBC 11.64 12.03   HGB 9.6* 8.3*   HCT 29.9* 26.6*   * 334     CMP:   Recent Labs   Lab 02/27/20  2130 02/28/20  0634 02/29/20  0525   NA  --  133* 133*   K  --  3.8 3.9   CL  --  99 98   CO2  --  25 22*   * 372* 357*   BUN  --  13 12   CREATININE  --  0.8 0.8   CALCIUM  --  7.8* 7.6*   ANIONGAP  --  9 13   EGFRNONAA  --  >60 >60       Significant Imaging: reviewed.

## 2020-03-01 PROBLEM — A41.9 SEPSIS: Status: ACTIVE | Noted: 2020-03-01

## 2020-03-01 LAB
ANION GAP SERPL CALC-SCNC: 10 MMOL/L (ref 8–16)
BASOPHILS # BLD AUTO: 0.03 K/UL (ref 0–0.2)
BASOPHILS NFR BLD: 0.2 % (ref 0–1.9)
BUN SERPL-MCNC: 10 MG/DL (ref 6–20)
CALCIUM SERPL-MCNC: 8 MG/DL (ref 8.7–10.5)
CHLORIDE SERPL-SCNC: 99 MMOL/L (ref 95–110)
CO2 SERPL-SCNC: 29 MMOL/L (ref 23–29)
CREAT SERPL-MCNC: 0.9 MG/DL (ref 0.5–1.4)
DIFFERENTIAL METHOD: ABNORMAL
EOSINOPHIL # BLD AUTO: 0.1 K/UL (ref 0–0.5)
EOSINOPHIL NFR BLD: 0.7 % (ref 0–8)
ERYTHROCYTE [DISTWIDTH] IN BLOOD BY AUTOMATED COUNT: 14.9 % (ref 11.5–14.5)
EST. GFR  (AFRICAN AMERICAN): >60 ML/MIN/1.73 M^2
EST. GFR  (NON AFRICAN AMERICAN): >60 ML/MIN/1.73 M^2
GLUCOSE SERPL-MCNC: 56 MG/DL (ref 70–110)
HCT VFR BLD AUTO: 26.6 % (ref 40–54)
HGB BLD-MCNC: 8.6 G/DL (ref 14–18)
IMM GRANULOCYTES # BLD AUTO: 0.77 K/UL (ref 0–0.04)
IMM GRANULOCYTES NFR BLD AUTO: 4.6 % (ref 0–0.5)
LYMPHOCYTES # BLD AUTO: 1.7 K/UL (ref 1–4.8)
LYMPHOCYTES NFR BLD: 10 % (ref 18–48)
MCH RBC QN AUTO: 26.5 PG (ref 27–31)
MCHC RBC AUTO-ENTMCNC: 32.3 G/DL (ref 32–36)
MCV RBC AUTO: 82 FL (ref 82–98)
MONOCYTES # BLD AUTO: 0.6 K/UL (ref 0.3–1)
MONOCYTES NFR BLD: 3.8 % (ref 4–15)
NEUTROPHILS # BLD AUTO: 13.5 K/UL (ref 1.8–7.7)
NEUTROPHILS NFR BLD: 80.7 % (ref 38–73)
NRBC BLD-RTO: 0 /100 WBC
PHOSPHATE SERPL-MCNC: 3.2 MG/DL (ref 2.7–4.5)
PLATELET # BLD AUTO: 427 K/UL (ref 150–350)
PMV BLD AUTO: 9.4 FL (ref 9.2–12.9)
POCT GLUCOSE: 229 MG/DL (ref 70–110)
POCT GLUCOSE: 260 MG/DL (ref 70–110)
POCT GLUCOSE: 275 MG/DL (ref 70–110)
POCT GLUCOSE: 388 MG/DL (ref 70–110)
POTASSIUM SERPL-SCNC: 3.2 MMOL/L (ref 3.5–5.1)
RBC # BLD AUTO: 3.25 M/UL (ref 4.6–6.2)
SODIUM SERPL-SCNC: 138 MMOL/L (ref 136–145)
VANCOMYCIN TROUGH SERPL-MCNC: 12.5 UG/ML (ref 10–22)
WBC # BLD AUTO: 16.67 K/UL (ref 3.9–12.7)

## 2020-03-01 PROCEDURE — 63600175 PHARM REV CODE 636 W HCPCS: Performed by: HOSPITALIST

## 2020-03-01 PROCEDURE — 80202 ASSAY OF VANCOMYCIN: CPT

## 2020-03-01 PROCEDURE — 11000001 HC ACUTE MED/SURG PRIVATE ROOM

## 2020-03-01 PROCEDURE — 99900035 HC TECH TIME PER 15 MIN (STAT)

## 2020-03-01 PROCEDURE — 80048 BASIC METABOLIC PNL TOTAL CA: CPT

## 2020-03-01 PROCEDURE — A4216 STERILE WATER/SALINE, 10 ML: HCPCS | Performed by: HOSPITALIST

## 2020-03-01 PROCEDURE — 87040 BLOOD CULTURE FOR BACTERIA: CPT

## 2020-03-01 PROCEDURE — 25000003 PHARM REV CODE 250: Performed by: HOSPITALIST

## 2020-03-01 PROCEDURE — 97535 SELF CARE MNGMENT TRAINING: CPT

## 2020-03-01 PROCEDURE — 36415 COLL VENOUS BLD VENIPUNCTURE: CPT

## 2020-03-01 PROCEDURE — 94761 N-INVAS EAR/PLS OXIMETRY MLT: CPT

## 2020-03-01 PROCEDURE — 97161 PT EVAL LOW COMPLEX 20 MIN: CPT

## 2020-03-01 PROCEDURE — 27000221 HC OXYGEN, UP TO 24 HOURS

## 2020-03-01 PROCEDURE — 25000003 PHARM REV CODE 250: Performed by: INTERNAL MEDICINE

## 2020-03-01 PROCEDURE — 85025 COMPLETE CBC W/AUTO DIFF WBC: CPT

## 2020-03-01 PROCEDURE — 97165 OT EVAL LOW COMPLEX 30 MIN: CPT

## 2020-03-01 PROCEDURE — 84100 ASSAY OF PHOSPHORUS: CPT

## 2020-03-01 RX ORDER — POTASSIUM CHLORIDE 20 MEQ/1
40 TABLET, EXTENDED RELEASE ORAL ONCE
Status: COMPLETED | OUTPATIENT
Start: 2020-03-01 | End: 2020-03-01

## 2020-03-01 RX ORDER — SODIUM CHLORIDE 9 MG/ML
INJECTION, SOLUTION INTRAVENOUS CONTINUOUS
Status: DISCONTINUED | OUTPATIENT
Start: 2020-03-02 | End: 2020-03-02

## 2020-03-01 RX ADMIN — CLONIDINE HYDROCHLORIDE 0.1 MG: 0.1 TABLET ORAL at 08:03

## 2020-03-01 RX ADMIN — POTASSIUM PHOSPHATE, MONOBASIC 1000 MG: 500 TABLET, SOLUBLE ORAL at 08:03

## 2020-03-01 RX ADMIN — Medication: at 05:03

## 2020-03-01 RX ADMIN — INSULIN ASPART 3 UNITS: 100 INJECTION, SOLUTION INTRAVENOUS; SUBCUTANEOUS at 08:03

## 2020-03-01 RX ADMIN — HYDROXYZINE PAMOATE 25 MG: 25 CAPSULE ORAL at 10:03

## 2020-03-01 RX ADMIN — VANCOMYCIN HYDROCHLORIDE 1000 MG: 1 INJECTION, POWDER, LYOPHILIZED, FOR SOLUTION INTRAVENOUS at 06:03

## 2020-03-01 RX ADMIN — GUAIFENESIN AND DEXTROMETHORPHAN 10 ML: 100; 10 SYRUP ORAL at 05:03

## 2020-03-01 RX ADMIN — Medication 10 ML: at 12:03

## 2020-03-01 RX ADMIN — POTASSIUM CHLORIDE 20 MEQ: 1500 TABLET, EXTENDED RELEASE ORAL at 10:03

## 2020-03-01 RX ADMIN — HYDROXYZINE PAMOATE 25 MG: 25 CAPSULE ORAL at 05:03

## 2020-03-01 RX ADMIN — PIPERACILLIN AND TAZOBACTAM 4.5 G: 4; .5 INJECTION, POWDER, FOR SOLUTION INTRAVENOUS at 03:03

## 2020-03-01 RX ADMIN — INSULIN ASPART 5 UNITS: 100 INJECTION, SOLUTION INTRAVENOUS; SUBCUTANEOUS at 08:03

## 2020-03-01 RX ADMIN — ACETAMINOPHEN 650 MG: 325 TABLET ORAL at 08:03

## 2020-03-01 RX ADMIN — INSULIN ASPART 5 UNITS: 100 INJECTION, SOLUTION INTRAVENOUS; SUBCUTANEOUS at 12:03

## 2020-03-01 RX ADMIN — GUAIFENESIN AND DEXTROMETHORPHAN 10 ML: 100; 10 SYRUP ORAL at 02:03

## 2020-03-01 RX ADMIN — GABAPENTIN 300 MG: 300 CAPSULE ORAL at 03:03

## 2020-03-01 RX ADMIN — GUAIFENESIN AND DEXTROMETHORPHAN 10 ML: 100; 10 SYRUP ORAL at 10:03

## 2020-03-01 RX ADMIN — INSULIN ASPART 6 UNITS: 100 INJECTION, SOLUTION INTRAVENOUS; SUBCUTANEOUS at 12:03

## 2020-03-01 RX ADMIN — TRAMADOL HYDROCHLORIDE 50 MG: 50 TABLET ORAL at 08:03

## 2020-03-01 RX ADMIN — INSULIN ASPART 5 UNITS: 100 INJECTION, SOLUTION INTRAVENOUS; SUBCUTANEOUS at 04:03

## 2020-03-01 RX ADMIN — FUROSEMIDE 40 MG: 40 TABLET ORAL at 08:03

## 2020-03-01 RX ADMIN — ATORVASTATIN CALCIUM 40 MG: 40 TABLET, FILM COATED ORAL at 08:03

## 2020-03-01 RX ADMIN — TRAMADOL HYDROCHLORIDE 50 MG: 50 TABLET ORAL at 10:03

## 2020-03-01 RX ADMIN — DOCUSATE SODIUM - SENNOSIDES 1 TABLET: 50; 8.6 TABLET, FILM COATED ORAL at 08:03

## 2020-03-01 RX ADMIN — Medication 10 ML: at 11:03

## 2020-03-01 RX ADMIN — PIPERACILLIN AND TAZOBACTAM 4.5 G: 4; .5 INJECTION, POWDER, FOR SOLUTION INTRAVENOUS at 10:03

## 2020-03-01 RX ADMIN — GABAPENTIN 300 MG: 300 CAPSULE ORAL at 08:03

## 2020-03-01 RX ADMIN — MUPIROCIN: 20 OINTMENT TOPICAL at 08:03

## 2020-03-01 RX ADMIN — INSULIN ASPART 10 UNITS: 100 INJECTION, SOLUTION INTRAVENOUS; SUBCUTANEOUS at 04:03

## 2020-03-01 RX ADMIN — PANTOPRAZOLE SODIUM 40 MG: 40 TABLET, DELAYED RELEASE ORAL at 08:03

## 2020-03-01 RX ADMIN — HYDROXYZINE PAMOATE 25 MG: 25 CAPSULE ORAL at 03:03

## 2020-03-01 RX ADMIN — PIPERACILLIN AND TAZOBACTAM 4.5 G: 4; .5 INJECTION, POWDER, FOR SOLUTION INTRAVENOUS at 08:03

## 2020-03-01 RX ADMIN — Medication 10 ML: at 06:03

## 2020-03-01 RX ADMIN — GUAIFENESIN AND DEXTROMETHORPHAN 10 ML: 100; 10 SYRUP ORAL at 03:03

## 2020-03-01 RX ADMIN — SODIUM CHLORIDE 1000 ML: 0.9 INJECTION, SOLUTION INTRAVENOUS at 11:03

## 2020-03-01 RX ADMIN — VANCOMYCIN HYDROCHLORIDE 1000 MG: 1 INJECTION, POWDER, LYOPHILIZED, FOR SOLUTION INTRAVENOUS at 05:03

## 2020-03-01 RX ADMIN — POTASSIUM PHOSPHATE, MONOBASIC 1000 MG: 500 TABLET, SOLUBLE ORAL at 03:03

## 2020-03-01 NOTE — NURSING
Patient has c/o SOB - O2 SAT on 2LPM per NC = 89-90%. Oxygen increased to 5 LPM. SAT increased to 94%.

## 2020-03-01 NOTE — PLAN OF CARE
Problem: Occupational Therapy Goal  Goal: Occupational Therapy Goal  Description  Goals to be met by: 3/15/2020    Patient will increase functional independence with ADLs by performing:    Feeding with [place].  UE Dressing with [place].  Grooming while standing at sink with Contact Guard Assistance.  Toileting from toilet with Contact Guard Assistance for hygiene and clothing management.   Supine to sit with Modified [place].  Step transfer with Contact Guard Assistance  Toilet transfer to toilet with Contact Guard Assistance.  Upper extremity exercise program x15 reps per handout, with independence.      Outcome: Ongoing, Progressing  OT eval completed. Patient participated w/ encouragement. Patient completed sit>stand t/f min A w/ RW. Patient completed self-care seated up in chair. Patient would benefit from SNF to ensure safe D/C.

## 2020-03-01 NOTE — PT/OT/SLP EVAL
Occupational Therapy   Evaluation    Name: Cali Mabry  MRN: [id number]  Admitting Diagnosis:  Diabetic ketoacidosis without coma associated with diabetes mellitus due to underlying condition      Recommendations:     Discharge Recommendations: nursing facility, skilled  Discharge Equipment Recommendations:  none  Barriers to discharge:  Decreased caregiver support    Assessment:     Cali Mabry is a 54 y.o. male with a medical diagnosis of Diabetic ketoacidosis without coma associated with diabetes mellitus due to underlying condition.  He presents with mobility and functional deficits. Performance deficits affecting function: weakness, impaired functional mobilty, decreased safety awareness, impaired cardiopulmonary response to activity, impaired endurance, impaired self care skills, gait instability, impaired balance, impaired sensation, decreased coordination, pain, decreased upper extremity function, decreased lower extremity function, decreased ROM. OT eval completed. Patient participated in eval w/ encouragement. Patient completed sit>stand t/f min assist w/ RW. Patient completed self-care seated up in chair. Patient would benefit from SNF to ensure safe D/C.     Rehab Prognosis: Good; patient would benefit from acute skilled OT services to address these deficits and reach maximum level of function.       Plan:     Patient to be seen 5 x/week to address the above listed problems via self-care/home management, therapeutic activities, therapeutic exercises  · Plan of Care Expires: 03/15/20  · Plan of Care Reviewed with: patient    Subjective     Chief Complaint: pain, weakness  Patient/Family Comments/goals: to get stronger    Occupational Profile:  Living Environment: Patient lives with mother in  house [address]. Patient's bathroom has tub/shower combo.  Previous level of function: Patient was amb w/ cane. Patient was independent in bathing and dressing.   Roles and Routines: Patient drives. Patient  "stated he "cooks and cleans sometimes".   Equipment Used at Home:  cane, straight  Assistance upon Discharge: mother    Pain/Comfort:  · Pain Rating 1: (pain in ribs, L side worse than R)  · Pain Addressed 1: Distraction, Cessation of Activity    Patients cultural, spiritual, Jain conflicts given the current situation: no    Objective:     Communicated with: nurse prior to session.  Patient found up in chair with oxygen, peripheral IV, telemetry upon OT entry to room.    General Precautions: Standard, fall, diabetic, pureed diet   Orthopedic Precautions:N/A   Braces: N/A     Occupational Performance:    Bed Mobility:    · Not tested 2* patient found up in chair     Functional Mobility/Transfers:  · Patient completed Sit <> Stand Transfer with minimum assistance  with  rolling walker and verbal cues for proper technique  · Functional Mobility: Patient completed sit>stand t/f min assist w/ RW x1 trial.     Activities of Daily Living:  · Grooming: supervision and set-up assist for mouth care seated in chair  · Upper Body Dressing: minimum assistance to don back gown seated up in chair, limited 2* lines.  · Lower Body Dressing: independence to doff/don socks seated up in chair    Cognitive/Visual Perceptual:  Cognitive/Psychosocial Skills:     -       Oriented to: Person, Place and Situation   -       Follows Commands/attention:Follows one-step commands  -       Communication: clear/fluent  -       Memory: No Deficits noted  -       Safety awareness/insight to disability: impaired   -       Mood/Affect/Coping skills/emotional control: Agitated    Physical Exam:  Postural examination/scapula alignment:    -       Rounded shoulders  -       Forward head  Skin integrity: Wound L heel of foot  Sensation:    -       Impaired  light/touch B hands (patient reports neuropathy)  Upper Extremity Range of Motion:     -       Right Upper Extremity: WFL  -       Left Upper Extremity: WFL  Upper Extremity Strength:    -       " Right Upper Extremity: 4/5  -       Left Upper Extremity: 4/5   Strength:    -       Right Upper Extremity: WFL  -       Left Upper Extremity: WFL    AMPAC 6 Click ADL:  AMPAC Total Score: 18    Treatment & Education:  Patient educated on OT role/POC. Patient completed functional mobility and self-care tasks with assistance as noted above. Patient educated on importance of activity. Patient educated to notify nurse for assistance as needed.   Education:    Patient left up in chair with all lines intact, call button in reach and nurse notified    GOALS:   Multidisciplinary Problems     Occupational Therapy Goals        Problem: Occupational Therapy Goal    Goal Priority Disciplines Outcome Interventions   Occupational Therapy Goal     OT, PT/OT Ongoing, Progressing    Description:  Goals to be met by: 3/15/2020    Patient will increase functional independence with ADLs by performing:    Feeding with [place].  UE Dressing with [place].  Grooming while standing at sink with Contact Guard Assistance.  Toileting from toilet with Contact Guard Assistance for hygiene and clothing management.   Supine to sit with Modified [place].  Step transfer with Contact Guard Assistance  Toilet transfer to toilet with Contact Guard Assistance.  Upper extremity exercise program x15 reps per handout, with independence.                       History:     Past Medical History:   Diagnosis Date    Diabetes mellitus     Hepatitis     HTN (hypertension)     Pancreatitis        Past Surgical History:   Procedure Laterality Date    CARDIAC SURGERY  1999    stent placed. ([facility])    SHOULDER SURGERY  2013    right shoulder, spur removal.       Time Tracking:     OT Date of Treatment: 03/01/20  OT Start Time: 1026  OT Stop Time: 1049  OT Total Time (min): 23 min    Billable Minutes:Evaluation 15 min (juan w/ PT)  Self Care/Home Management 8 min  Total Time 23 min    HUMBERTO Cornejo  3/1/2020

## 2020-03-01 NOTE — PLAN OF CARE
Problem: Adult Inpatient Plan of Care  Goal: Plan of Care Review  Outcome: Ongoing, Progressing  Flowsheets (Taken 3/1/2020 0054)  Plan of Care Reviewed With: patient  Goal: Optimal Comfort and Wellbeing  Outcome: Ongoing, Progressing  Intervention: Provide Person-Centered Care  Flowsheets (Taken 3/1/2020 0054)  Trust Relationship/Rapport: care explained; choices provided; emotional support provided; empathic listening provided; reassurance provided; questions encouraged; questions answered; thoughts/feelings acknowledged     Problem: Diabetes Comorbidity  Goal: Blood Glucose Level Within Desired Range  Outcome: Ongoing, Progressing  Intervention: Maintain Glycemic Control  Flowsheets (Taken 3/1/2020 0054)  Glycemic Management: blood glucose monitoring; supplemental insulin given     Problem: Fall Injury Risk  Goal: Absence of Fall and Fall-Related Injury  Outcome: Ongoing, Progressing  Intervention: Identify and Manage Contributors to Fall Injury Risk  Flowsheets (Taken 3/1/2020 0054)  Self-Care Promotion: independence encouraged; BADL personal objects within reach; BADL personal routines maintained  Medication Review/Management: medications reviewed  Intervention: Promote Injury-Free Environment  Flowsheets (Taken 3/1/2020 0054)  Safety Promotion/Fall Prevention: assistive device/personal item within reach; bed alarm set; commode/urinal/bedpan at bedside; Fall Risk reviewed with patient/family; lighting adjusted; medications reviewed; side rails raised x 2; nonskid shoes/socks when out of bed; instructed to call staff for mobility  Environmental Safety Modification: assistive device/personal items within reach; clutter free environment maintained; lighting adjusted; room near unit station; room organization consistent

## 2020-03-01 NOTE — PLAN OF CARE
Problem: Adult Inpatient Plan of Care  Goal: Plan of Care Review  Outcome: Ongoing, Progressing  Goal: Patient-Specific Goal (Individualization)  Outcome: Ongoing, Progressing  Goal: Absence of Hospital-Acquired Illness or Injury  Outcome: Ongoing, Progressing  Goal: Optimal Comfort and Wellbeing  Outcome: Ongoing, Progressing  Goal: Readiness for Transition of Care  Outcome: Ongoing, Progressing  Goal: Rounds/Family Conference  Outcome: Ongoing, Progressing     Problem: Diabetes Comorbidity  Goal: Blood Glucose Level Within Desired Range  Outcome: Ongoing, Progressing     Problem: Diabetic Ketoacidosis  Goal: Fluid and Electrolyte Balance with Absence of Ketosis  Outcome: Ongoing, Progressing     Problem: Oral Intake Inadequate  Goal: Improved Oral Intake  Outcome: Ongoing, Progressing     Problem: Skin Injury Risk Increased  Goal: Skin Health and Integrity  Outcome: Ongoing, Progressing     Problem: Fall Injury Risk  Goal: Absence of Fall and Fall-Related Injury  Outcome: Ongoing, Progressing     Problem: Infection  Goal: Infection Symptom Resolution  Outcome: Ongoing, Progressing     Problem: Wound  Goal: Optimal Wound Healing  Outcome: Ongoing, Progressing

## 2020-03-01 NOTE — PT/OT/SLP EVAL
Physical Therapy Evaluation    Patient Name:  Cali Mabry   MRN:  [id number]    Recommendations:     Discharge Recommendations:  nursing facility, skilled   Discharge Equipment Recommendations: Ongoing assessment pending pt progress  Barriers to discharge: Decreased caregiver support, pt was completely independent PTA and is now severely deconditioned 2/2 prolonged hospital stay.  Pt is at high risk for falls and re-admission if he returns to prior living arrangements at present time.    Assessment:     Cali Mabry is a 54 y.o. male admitted with a medical diagnosis of Diabetic ketoacidosis without coma associated with diabetes mellitus due to underlying condition.  He presents with the following impairments/functional limitations:  weakness, gait instability, decreased upper extremity function, impaired endurance, impaired balance, decreased lower extremity function, decreased ROM, impaired cardiopulmonary response to activity, impaired coordination, decreased safety awareness, pain, impaired self care skills, impaired functional mobilty, decreased coordination .    Rehab Prognosis: Good; patient would benefit from acute skilled PT services to address these deficits and reach maximum level of function.    Recent Surgery: * No surgery found *      Plan:     During this hospitalization, patient to be seen (5-6x/wk) to address the identified rehab impairments via gait training, therapeutic activities, therapeutic exercises and progress toward the following goals:    · Plan of Care Expires:  03/15/20    Subjective     Chief Complaint: Pain in ribs from coughing, Dizziness that is worse with standing  Patient/Family Comments/goals: PLOF, to go home when able    Pain/Comfort:  · Pain Rating 1: (Not rated, Rad Navarro 6/10)  · Location 1: (L ribs)  · Pain Addressed 1: Pre-medicate for activity, Reposition, Distraction, Cessation of Activity, Nurse notified    Patients cultural, spiritual, Amish conflicts  given the current situation: no    Living Environment:  Pt lives with his Mother in a H with no concerns  Prior to admission, patients level of function was Independent with ambulation, ADL's, IADL's, driving.  Equipment used at home: none.  DME owned (not currently used): single point cane.  Upon discharge, patient will have assistance from Elderly mother    Objective:     Communicated with nsg prior to session.  Patient found up in chair with oxygen, telemetry, peripheral IV  upon PT entry to room.    General Precautions: Standard, fall, pureed diet, diabetic   Orthopedic Precautions:N/A   Braces: N/A     Exams:  · Cognitive Exam:  Patient is oriented to Person, Place, Time and Situation  · Gross Motor Coordination:  impaired 2/2 gen weakness and deconditioning  · Postural Exam:  Patient presented with the following abnormalities:    · -       Rounded shoulders  · -       Forward head  · Sensation:    · -       Intact to light touch L LE, Decreased to light touch R LE  · Skin Integrity/Edema:      · -       Dressing to L shin, small lesion on R lateral heel  · RLE ROM: PROM WFL's  · RLE Strength: WFL except Dflx 2+/5  · LLE ROM: WFL  · LLE Strength: WFL    Functional Mobility:  · Transfers:     · Sit to Stand:  minimum assistance and with Vc for forward weight shift over TATI and hand placement/safety with rolling walker  · Gait: Gait training x 2 steps forward/backward with CGA for balance and Min A for walker management.    · Balance: Fair+ sit, Fair stand with RW      Therapeutic Activities and Exercises:   Eval, t/f and gait training as above.  Educated patient on role and importance of PT for recovery to PLOF.  Pt verbalized understanding     AM-PAC 6 CLICK MOBILITY  Total Score:17     Patient left up in chair with all lines intact, call button in reach, nsg notified and OT student present.    GOALS:   Multidisciplinary Problems     Physical Therapy Goals        Problem: Physical Therapy Goal    Goal  Priority Disciplines Outcome Goal Variances Interventions   Physical Therapy Goal     PT, PT/OT      Description:  Goals to be met by: 3/15/2020     Patient will increase functional independence with mobility by performin. Sit to stand transfer with Modified [place]  2. Gait  x 150 feet with Modified [place] with or without AD   3 Lower extremity exercise program x10 reps per handout, with independence                      History:     Past Medical History:   Diagnosis Date    Diabetes mellitus     Hepatitis     HTN (hypertension)     Pancreatitis        Past Surgical History:   Procedure Laterality Date    [facility]      [facility]    SHOULDER SURGERY      right shoulder, spur removal.       Time Tracking:     PT Received On: 20  PT Start Time: 1025     PT Stop Time: 1047  PT Total Time (min): 22 min     Billable Minutes: Evaluation 15      Eri Vasquez, PT  2020

## 2020-03-01 NOTE — NURSING
Patient is A&Ox3 with some episodes of confusion patient has Right upper arm double lumen PICC and makes needs known. Patient remained free from falls during this shift. Patient has been getting IV antibiotics,  And is on O2 via nasal cannula at 3L per minute with humidification. VSS. Patient given PRN pain medication upon request. No acute distress noted at this time.  Patient has been cooperative throughout most of this shift but has refused his incentive spirometer as well as his lovenox.  Patient has had several low blood pressures throughout this shift.  MD Dr. Henderson has been notified with no new orders and have been instructed to continue to monitor this patient. Patient has been up in the chair during this shift. Bed in low position, bed alarm on, call bell within reach.

## 2020-03-01 NOTE — NURSING
Patient has fever of 102.3 and 32 respirations per minute.  MD Dr Henderson notified.  No new orders at this time

## 2020-03-01 NOTE — NURSING
MD. Dr. Henderson notified of patient's blood pressure 83/49, respirations of 32, Temp 99.2, and O2 of 97% on 3L of O2.  No new orders at this time, instructed by MD to continue to monitor patient.

## 2020-03-01 NOTE — ASSESSMENT & PLAN NOTE
he is septic with fever,leucocytiosis,tachycardia,tachypnea,likely duo to pneumonia.repeated blood culture.on IV Abx,

## 2020-03-01 NOTE — PROGRESS NOTES
[facility] Hospital Medicine  Progress Note    Patient Name: Cali Mabry  MRN: [id number]  Patient Class: IP- Inpatient   Admission Date: 2/22/2020  Length of Stay: 8 days  Attending Physician: Kalyani Alberto MD  Primary Care Provider: Primary Doctor No        Subjective:     Principal Problem:Diabetic ketoacidosis without coma associated with diabetes mellitus due to underlying condition        HPI:  54-year-old male past medical history diabetes, hepatitis, hypertension history of polysubstance abuse, pancreatitis presents with abdominal pain for the last 2 days with nausea and vomiting home, and worsening body aches over the last day.  Patient reports he has not had his insulin in some time and reports his glucose is very high.  He states using IV heroin last use few days ago reportedly.  He reports coughing significant amount denies any phlegm production, denies any blood in his sputum, or any chest pain. Denies any blood in her stool, reports normal last bowel movement, reports he is urinating as normal and reports he is extremely thirsty and hungry.  Denies any fevers/chills, no extremity weakness/numbness/tingling.he is in DKA with blood sugar of 635,AG of 26 and CO 2 of 6,he has benen started on DKA protocol,RAMYA with CRT of 1.7,chest  Ray show also sign of pneumonia,patient has been started on broad spectrum IV Abx afterr bload culture is done,    Overview/Hospital Course:  54-year-old male past medical history diabetes, hepatitis, hypertension history of polysubstance abuse,IV heroin,chronic  pancreatitis presents with abdominal pain for the last 2 days with nausea and vomiting home, and worsening body aches over the last day.  Patient reports he has not had his insulin in some time and reports his glucose is very high.  He states using IV heroin last use few days ago reportedly.  He reports coughing significant amount denies any phlegm production, denies any blood in his  sputum, or any chest pain. Denies any blood in her stool, reports normal last bowel movement, reports he is urinating as normal and reports he is extremely thirsty and hungry.  Denies any fevers/chills, no extremity weakness/numbness/tingling.he is in DKA with blood sugar of 635,AG of 26 and CO 2 of 6,he has been  started on DKA protocol,RAMYA with CRT of 1.7,chest  ray show also sign of pneumonia,patient has been started on broad spectrum IV Abx afterr bload culture is done,  AG is closed,started on SQ insulin,started on diet.  Has bacteremia,staph aureus,likely from IV heroin use,alreday on Vanc.repeated blood culture,Echo to R/Oed out  endocarditis.  On Zosyn and azithromycin for pneumonia.  Repeat blood culture no growth,consulted SW for LTAC placement,he is IV drug user,can not go home.reperat blood culture show growth,will place picc line.  Afrberile,no place in LTAC yet.  Podiatry cleaned right heel callus,  Afebrile.  Require more oxygen supplement,chest X ray show possible aspiration,adjusted Abx,changed Rocephin to zosyn,added IS,aspiration precaution,consulted ST.on puree diet,oxygenation is improved,but he is septic with fever,leucocytiosis,tachycardia,tachypnea,likely duo to pneumonia.repeated blood culture.  Consulted PT,OT.      Past Medical History:   Diagnosis Date    Diabetes mellitus     Hepatitis     HTN (hypertension)     Pancreatitis        Past Surgical History:   Procedure Laterality Date    [place] [place]    SHOULDER SURGERY  2013    right shoulder, spur removal.       Review of patient's allergies indicates:  No Known Allergies    No current facility-administered medications on file prior to encounter.      Current Outpatient Medications on File Prior to Encounter   Medication Sig    atorvastatin (LIPITOR) 40 MG tablet Take 40 mg by mouth once daily.    gabapentin (NEURONTIN) 300 MG capsule Take 300 mg by mouth.    ibuprofen (ADVIL,MOTRIN)  600 MG tablet Take 1 tablet (600 mg total) by mouth every 6 (six) hours as needed for Pain.    insulin aspart U-100 (NOVOLOG) 100 unit/mL (3 mL) InPn pen Inject 10 Units into the skin.    LANTUS 100 unit/mL injection Inject 15 Units into the skin 2 (two) times daily. (Patient taking differently: Inject 30 Units into the skin once daily. )    naloxone (NARCAN) 1 mg/mL injection 2 mg (1 mg per nostril) by Nasal route as needed for opioid overdose; may repeat in 3 to 5 minutes if not effective. Call 911    naloxone (NARCAN) 4 mg/actuation Spry 4mg by nasal route as needed for opioid overdose; may repeat every 2-3 minutes in alternating nostrils until medical help arrives. Call 911    nicotine (NICODERM CQ) 21 mg/24 hr Place 1 patch onto the skin once daily.    omeprazole (PRILOSEC) 40 MG capsule Take 1 capsule (40 mg total) by mouth once daily.     Family History     Problem Relation (Age of Onset)    Asthma Mother        Tobacco Use    Smoking status: Current Every Day Smoker     Packs/day: 1.00     Years: 20.00     Pack years: 20.00     Types: Cigarettes     Start date: 4/3/1981    Smokeless tobacco: Never Used   Substance and Sexual Activity    Alcohol use: No     Alcohol/week: 0.0 standard drinks    Drug use: Yes     Types: IV, Marijuana, Heroin     Comment: currently, used today, herion    Sexual activity: Yes     Partners: Female     Review of Systems   Constitutional: Positive for activity change and appetite change.   HENT: Negative for congestion and dental problem.    Eyes: Negative for discharge and itching.   Respiratory: Positive for cough. Negative for apnea and chest tightness.    Cardiovascular: Negative for chest pain and leg swelling.   Gastrointestinal: Negative for abdominal pain, nausea and vomiting.   Endocrine: Negative for cold intolerance.   Genitourinary: Negative for difficulty urinating and dysuria.   Musculoskeletal: Negative for arthralgias and back pain.   Skin: Negative for  color change and pallor.   Allergic/Immunologic: Negative for environmental allergies and food allergies.   Neurological: Negative for dizziness and facial asymmetry.   Hematological: Does not bruise/bleed easily.   Psychiatric/Behavioral: Negative for agitation.     Objective:     Vital Signs (Most Recent):  Temp: (!) 100.6 °F (38.1 °C) (03/01/20 0915)  Pulse: (!) 112 (03/01/20 0915)  Resp: (!) 28 (03/01/20 0915)  BP: 135/75 (03/01/20 0756)  SpO2: 95 % (03/01/20 0915) Vital Signs (24h Range):  Temp:  [98.5 °F (36.9 °C)-102.3 °F (39.1 °C)] 100.6 °F (38.1 °C)  Pulse:  [] 112  Resp:  [18-32] 28  SpO2:  [92 %-97 %] 95 %  BP: (105-135)/(56-75) 135/75     Weight: 57.4 kg (126 lb 8.7 oz)  Body mass index is 19.24 kg/m².    Physical Exam   Constitutional: He is oriented to person, place, and time. No distress.   HENT:   Head: Atraumatic.   Eyes: Pupils are equal, round, and reactive to light. EOM are normal.   Neck: Normal range of motion. Neck supple.   Cardiovascular: Normal rate and regular rhythm.   Pulmonary/Chest: Effort normal.   Abdominal: Soft. He exhibits no distension.   Musculoskeletal: Normal range of motion. He exhibits no edema or deformity.   Neurological: He is oriented to person, place, and time. No cranial nerve deficit. Coordination normal.   Skin: Skin is warm and dry.   Psychiatric: He has a normal mood and affect. His behavior is normal.         CRANIAL NERVES     CN III, IV, VI   Pupils are equal, round, and reactive to light.  Extraocular motions are normal.        Significant Labs:   CBC:   Recent Labs   Lab 02/29/20 0525 03/01/20 0353   WBC 12.03 16.67*   HGB 8.3* 8.6*   HCT 26.6* 26.6*    427*     CMP:   Recent Labs   Lab 02/29/20 0525 03/01/20 0353   * 138   K 3.9 3.2*   CL 98 99   CO2 22* 29   * 56*   BUN 12 10   CREATININE 0.8 0.9   CALCIUM 7.6* 8.0*   ANIONGAP 13 10   EGFRNONAA >60 >60       Significant Imaging: reviewed.      Assessment/Plan:      * Diabetic  ketoacidosis without coma associated with diabetes mellitus due to underlying condition  DKA resolved  Now with hypoglycemia  Has received D50, patient eating well  If hypoglycemia persist, will start D51/2NS      Sepsis   he is septic with fever,leucocytiosis,tachycardia,tachypnea,likely duo to pneumonia.repeated blood culture.on IV Abx,      Acute respiratory failure with hypoxia  Duo to pneumonia,Require more oxygen supplement,chest X ray show possible aspiration,adjusted Abx,chnaged Rocephin to zosyn,added IS,aspiration precaution,consulted ST.on puree diet,oxygenation is improving slowly.      MRSA bacteremia  Blood cultures (+) MRSA  Likely from IV heroin use  Continue Vancomycin  Blood cultures repeated  Echo performed: WNL  Repeat blood culture no growth,consulted SW for LTAC placement,he is IV drug user,can not go home.reperat blood culture show growth, placed picc line.    Afrberile,no place in LTAC yet.  Afebrile.        Pneumonia due to infectious organism  Per clinical presentation and imaging,will continue with IV Abx.      Chronic hepatitis C  Will monitor.      Intravenous drug abuse  Consult on IV heroin cessation use  Patient states that he quit 3-4 weeks ago      Hyperlipidemia  On statin.      Pancreatitis, chronic  Stable at this  time.      Essential hypertension  Stable at this time with no meds  Will monitor throughout hospital course    Type 2 diabetes mellitus, uncontrolled  Currently hypoglycemia  Will monitor blood glucose q.a.c. and HS  Will adjust regimen based on blood glucose trends        VTE Risk Mitigation (From admission, onward)         Ordered     enoxaparin injection 40 mg  Daily      02/22/20 1157     IP VTE HIGH RISK PATIENT  Once      02/22/20 1157     Place DAWIT hose  Until discontinued      02/22/20 1157                      Kalyani Alberto MD  Department of Hospital Medicine   [facility]

## 2020-03-01 NOTE — ASSESSMENT & PLAN NOTE
Duo to pneumonia,Require more oxygen supplement,chest X ray show possible aspiration,adjusted Abx,chnaged Rocephin to zosyn,added IS,aspiration precaution,consulted ST.on puree diet,oxygenation is improving slowly.

## 2020-03-01 NOTE — SUBJECTIVE & OBJECTIVE
Past Medical History:   Diagnosis Date    Diabetes mellitus     Hepatitis     HTN (hypertension)     Pancreatitis        Past Surgical History:   Procedure Laterality Date    CARDIAC SURGERY  1999    [facility]    SHOULDER SURGERY  2013    right shoulder, spur removal.       Review of patient's allergies indicates:  No Known Allergies    No current facility-administered medications on file prior to encounter.      Current Outpatient Medications on File Prior to Encounter   Medication Sig    atorvastatin (LIPITOR) 40 MG tablet Take 40 mg by mouth once daily.    gabapentin (NEURONTIN) 300 MG capsule Take 300 mg by mouth.    ibuprofen (ADVIL,MOTRIN) 600 MG tablet Take 1 tablet (600 mg total) by mouth every 6 (six) hours as needed for Pain.    insulin aspart U-100 (NOVOLOG) 100 unit/mL (3 mL) InPn pen Inject 10 Units into the skin.    LANTUS 100 unit/mL injection Inject 15 Units into the skin 2 (two) times daily. (Patient taking differently: Inject 30 Units into the skin once daily. )    naloxone (NARCAN) 1 mg/mL injection 2 mg (1 mg per nostril) by Nasal route as needed for opioid overdose; may repeat in 3 to 5 minutes if not effective. Call 911    naloxone (NARCAN) 4 mg/actuation Spry 4mg by nasal route as needed for opioid overdose; may repeat every 2-3 minutes in alternating nostrils until medical help arrives. Call 911    nicotine (NICODERM CQ) 21 mg/24 hr Place 1 patch onto the skin once daily.    omeprazole (PRILOSEC) 40 MG capsule Take 1 capsule (40 mg total) by mouth once daily.     Family History     Problem Relation (Age of Onset)    Asthma Mother        Tobacco Use    Smoking status: Current Every Day Smoker     Packs/day: 1.00     Years: 20.00     Pack years: 20.00     Types: Cigarettes     Start date: 4/3/1981    Smokeless tobacco: Never Used   Substance and Sexual Activity    Alcohol use: No     Alcohol/week: 0.0 standard drinks    Drug use: Yes     Types: IV,  Marijuana, Heroin     Comment: currently, used today, herion    Sexual activity: Yes     Partners: Female     Review of Systems   Constitutional: Positive for activity change and appetite change.   HENT: Negative for congestion and dental problem.    Eyes: Negative for discharge and itching.   Respiratory: Positive for cough. Negative for apnea and chest tightness.    Cardiovascular: Negative for chest pain and leg swelling.   Gastrointestinal: Negative for abdominal pain, nausea and vomiting.   Endocrine: Negative for cold intolerance.   Genitourinary: Negative for difficulty urinating and dysuria.   Musculoskeletal: Negative for arthralgias and back pain.   Skin: Negative for color change and pallor.   Allergic/Immunologic: Negative for environmental allergies and food allergies.   Neurological: Negative for dizziness and facial asymmetry.   Hematological: Does not bruise/bleed easily.   Psychiatric/Behavioral: Negative for agitation.     Objective:     Vital Signs (Most Recent):  Temp: (!) 100.6 °F (38.1 °C) (03/01/20 0915)  Pulse: (!) 112 (03/01/20 0915)  Resp: (!) 28 (03/01/20 0915)  BP: 135/75 (03/01/20 0756)  SpO2: 95 % (03/01/20 0915) Vital Signs (24h Range):  Temp:  [98.5 °F (36.9 °C)-102.3 °F (39.1 °C)] 100.6 °F (38.1 °C)  Pulse:  [] 112  Resp:  [18-32] 28  SpO2:  [92 %-97 %] 95 %  BP: (105-135)/(56-75) 135/75     Weight: 57.4 kg (126 lb 8.7 oz)  Body mass index is 19.24 kg/m².    Physical Exam   Constitutional: He is oriented to person, place, and time. No distress.   HENT:   Head: Atraumatic.   Eyes: Pupils are equal, round, and reactive to light. EOM are normal.   Neck: Normal range of motion. Neck supple.   Cardiovascular: Normal rate and regular rhythm.   Pulmonary/Chest: Effort normal.   Abdominal: Soft. He exhibits no distension.   Musculoskeletal: Normal range of motion. He exhibits no edema or deformity.   Neurological: He is oriented to person, place, and time. No cranial nerve deficit.  Coordination normal.   Skin: Skin is warm and dry.   Psychiatric: He has a normal mood and affect. His behavior is normal.         CRANIAL NERVES     CN III, IV, VI   Pupils are equal, round, and reactive to light.  Extraocular motions are normal.        Significant Labs:   CBC:   Recent Labs   Lab 02/29/20  0525 03/01/20  0353   WBC 12.03 16.67*   HGB 8.3* 8.6*   HCT 26.6* 26.6*    427*     CMP:   Recent Labs   Lab 02/29/20 0525 03/01/20  0353   * 138   K 3.9 3.2*   CL 98 99   CO2 22* 29   * 56*   BUN 12 10   CREATININE 0.8 0.9   CALCIUM 7.6* 8.0*   ANIONGAP 13 10   EGFRNONAA >60 >60       Significant Imaging: reviewed.

## 2020-03-02 LAB
ANION GAP SERPL CALC-SCNC: 11 MMOL/L (ref 8–16)
BASOPHILS # BLD AUTO: 0.04 K/UL (ref 0–0.2)
BASOPHILS NFR BLD: 0.2 % (ref 0–1.9)
BUN SERPL-MCNC: 12 MG/DL (ref 6–20)
CALCIUM SERPL-MCNC: 7.5 MG/DL (ref 8.7–10.5)
CHLORIDE SERPL-SCNC: 99 MMOL/L (ref 95–110)
CO2 SERPL-SCNC: 24 MMOL/L (ref 23–29)
CREAT SERPL-MCNC: 0.9 MG/DL (ref 0.5–1.4)
DIFFERENTIAL METHOD: ABNORMAL
EOSINOPHIL # BLD AUTO: 0.1 K/UL (ref 0–0.5)
EOSINOPHIL NFR BLD: 0.4 % (ref 0–8)
ERYTHROCYTE [DISTWIDTH] IN BLOOD BY AUTOMATED COUNT: 15.3 % (ref 11.5–14.5)
EST. GFR  (AFRICAN AMERICAN): >60 ML/MIN/1.73 M^2
EST. GFR  (NON AFRICAN AMERICAN): >60 ML/MIN/1.73 M^2
GLUCOSE SERPL-MCNC: 304 MG/DL (ref 70–110)
GLUCOSE SERPL-MCNC: 49 MG/DL (ref 70–110)
HCT VFR BLD AUTO: 25.4 % (ref 40–54)
HGB BLD-MCNC: 7.8 G/DL (ref 14–18)
IMM GRANULOCYTES # BLD AUTO: 0.56 K/UL (ref 0–0.04)
IMM GRANULOCYTES NFR BLD AUTO: 2.8 % (ref 0–0.5)
LEFT ABI: 1.74
LEFT ARM BP: 146 MMHG
LEFT DORSALIS PEDIS: 254 MMHG
LEFT POSTERIOR TIBIAL: 158 MMHG
LEFT TBI: 0.34
LEFT TOE PRESSURE: 49 MMHG
LYMPHOCYTES # BLD AUTO: 1.5 K/UL (ref 1–4.8)
LYMPHOCYTES NFR BLD: 7.4 % (ref 18–48)
MCH RBC QN AUTO: 25.5 PG (ref 27–31)
MCHC RBC AUTO-ENTMCNC: 30.7 G/DL (ref 32–36)
MCV RBC AUTO: 83 FL (ref 82–98)
MONOCYTES # BLD AUTO: 0.7 K/UL (ref 0.3–1)
MONOCYTES NFR BLD: 3.5 % (ref 4–15)
NEUTROPHILS # BLD AUTO: 17.1 K/UL (ref 1.8–7.7)
NEUTROPHILS NFR BLD: 85.7 % (ref 38–73)
NRBC BLD-RTO: 0 /100 WBC
PHOSPHATE SERPL-MCNC: 2.8 MG/DL (ref 2.7–4.5)
PLATELET # BLD AUTO: 456 K/UL (ref 150–350)
PMV BLD AUTO: 9.3 FL (ref 9.2–12.9)
POCT GLUCOSE: 126 MG/DL (ref 70–110)
POCT GLUCOSE: 158 MG/DL (ref 70–110)
POCT GLUCOSE: 206 MG/DL (ref 70–110)
POCT GLUCOSE: 35 MG/DL (ref 70–110)
POCT GLUCOSE: 435 MG/DL (ref 70–110)
POCT GLUCOSE: 45 MG/DL (ref 70–110)
POTASSIUM SERPL-SCNC: 4.4 MMOL/L (ref 3.5–5.1)
RBC # BLD AUTO: 3.06 M/UL (ref 4.6–6.2)
RIGHT ABI: 0.97
RIGHT DORSALIS PEDIS: 141 MMHG
RIGHT POSTERIOR TIBIAL: 75 MMHG
RIGHT TBI: 0.14
RIGHT TOE PRESSURE: 20 MMHG
SODIUM SERPL-SCNC: 134 MMOL/L (ref 136–145)
WBC # BLD AUTO: 19.97 K/UL (ref 3.9–12.7)

## 2020-03-02 PROCEDURE — 84100 ASSAY OF PHOSPHORUS: CPT

## 2020-03-02 PROCEDURE — 63600175 PHARM REV CODE 636 W HCPCS: Performed by: HOSPITALIST

## 2020-03-02 PROCEDURE — 97530 THERAPEUTIC ACTIVITIES: CPT | Mod: CQ

## 2020-03-02 PROCEDURE — A4216 STERILE WATER/SALINE, 10 ML: HCPCS | Performed by: HOSPITALIST

## 2020-03-02 PROCEDURE — 25000003 PHARM REV CODE 250: Performed by: HOSPITALIST

## 2020-03-02 PROCEDURE — 85025 COMPLETE CBC W/AUTO DIFF WBC: CPT

## 2020-03-02 PROCEDURE — 25000003 PHARM REV CODE 250: Performed by: INTERNAL MEDICINE

## 2020-03-02 PROCEDURE — 92507 TX SP LANG VOICE COMM INDIV: CPT

## 2020-03-02 PROCEDURE — 36415 COLL VENOUS BLD VENIPUNCTURE: CPT

## 2020-03-02 PROCEDURE — 86580 TB INTRADERMAL TEST: CPT | Performed by: HOSPITALIST

## 2020-03-02 PROCEDURE — 80048 BASIC METABOLIC PNL TOTAL CA: CPT

## 2020-03-02 PROCEDURE — 97110 THERAPEUTIC EXERCISES: CPT

## 2020-03-02 PROCEDURE — 11000001 HC ACUTE MED/SURG PRIVATE ROOM

## 2020-03-02 PROCEDURE — C9399 UNCLASSIFIED DRUGS OR BIOLOG: HCPCS | Performed by: HOSPITALIST

## 2020-03-02 PROCEDURE — 30200315 PPD INTRADERMAL TEST REV CODE 302: Performed by: HOSPITALIST

## 2020-03-02 PROCEDURE — 82947 ASSAY GLUCOSE BLOOD QUANT: CPT

## 2020-03-02 PROCEDURE — 97803 MED NUTRITION INDIV SUBSEQ: CPT

## 2020-03-02 PROCEDURE — 97535 SELF CARE MNGMENT TRAINING: CPT

## 2020-03-02 RX ORDER — BENZONATATE 100 MG/1
100 CAPSULE ORAL 3 TIMES DAILY
Status: DISCONTINUED | OUTPATIENT
Start: 2020-03-02 | End: 2020-03-08 | Stop reason: HOSPADM

## 2020-03-02 RX ORDER — FUROSEMIDE 10 MG/ML
20 INJECTION INTRAMUSCULAR; INTRAVENOUS ONCE
Status: COMPLETED | OUTPATIENT
Start: 2020-03-02 | End: 2020-03-02

## 2020-03-02 RX ORDER — TALC
6 POWDER (GRAM) TOPICAL NIGHTLY PRN
Status: DISCONTINUED | OUTPATIENT
Start: 2020-03-02 | End: 2020-03-08 | Stop reason: HOSPADM

## 2020-03-02 RX ADMIN — TUBERCULIN PURIFIED PROTEIN DERIVATIVE 5 UNITS: 5 INJECTION, SOLUTION INTRADERMAL at 04:03

## 2020-03-02 RX ADMIN — FUROSEMIDE 20 MG: 10 INJECTION, SOLUTION INTRAMUSCULAR; INTRAVENOUS at 08:03

## 2020-03-02 RX ADMIN — INSULIN ASPART 4 UNITS: 100 INJECTION, SOLUTION INTRAVENOUS; SUBCUTANEOUS at 12:03

## 2020-03-02 RX ADMIN — Medication 10 ML: at 06:03

## 2020-03-02 RX ADMIN — INSULIN ASPART 5 UNITS: 100 INJECTION, SOLUTION INTRAVENOUS; SUBCUTANEOUS at 12:03

## 2020-03-02 RX ADMIN — PIPERACILLIN AND TAZOBACTAM 4.5 G: 4; .5 INJECTION, POWDER, FOR SOLUTION INTRAVENOUS at 03:03

## 2020-03-02 RX ADMIN — INSULIN ASPART 5 UNITS: 100 INJECTION, SOLUTION INTRAVENOUS; SUBCUTANEOUS at 08:03

## 2020-03-02 RX ADMIN — BENZONATATE 100 MG: 100 CAPSULE ORAL at 02:03

## 2020-03-02 RX ADMIN — BENZONATATE 100 MG: 100 CAPSULE ORAL at 09:03

## 2020-03-02 RX ADMIN — ATORVASTATIN CALCIUM 40 MG: 40 TABLET, FILM COATED ORAL at 08:03

## 2020-03-02 RX ADMIN — HYDROXYZINE PAMOATE 25 MG: 25 CAPSULE ORAL at 05:03

## 2020-03-02 RX ADMIN — BENZONATATE 100 MG: 100 CAPSULE ORAL at 04:03

## 2020-03-02 RX ADMIN — VANCOMYCIN HYDROCHLORIDE 1000 MG: 1 INJECTION, POWDER, LYOPHILIZED, FOR SOLUTION INTRAVENOUS at 05:03

## 2020-03-02 RX ADMIN — GABAPENTIN 300 MG: 300 CAPSULE ORAL at 09:03

## 2020-03-02 RX ADMIN — Medication 25 G: at 08:03

## 2020-03-02 RX ADMIN — TRAMADOL HYDROCHLORIDE 50 MG: 50 TABLET ORAL at 02:03

## 2020-03-02 RX ADMIN — MUPIROCIN: 20 OINTMENT TOPICAL at 09:03

## 2020-03-02 RX ADMIN — GABAPENTIN 300 MG: 300 CAPSULE ORAL at 08:03

## 2020-03-02 RX ADMIN — Medication 6 MG: at 10:03

## 2020-03-02 RX ADMIN — PIPERACILLIN AND TAZOBACTAM 4.5 G: 4; .5 INJECTION, POWDER, FOR SOLUTION INTRAVENOUS at 12:03

## 2020-03-02 RX ADMIN — VANCOMYCIN HYDROCHLORIDE 1000 MG: 1 INJECTION, POWDER, LYOPHILIZED, FOR SOLUTION INTRAVENOUS at 06:03

## 2020-03-02 RX ADMIN — FUROSEMIDE 40 MG: 40 TABLET ORAL at 08:03

## 2020-03-02 RX ADMIN — HYDROXYZINE PAMOATE 25 MG: 25 CAPSULE ORAL at 09:03

## 2020-03-02 RX ADMIN — Medication 10 ML: at 05:03

## 2020-03-02 RX ADMIN — TRAMADOL HYDROCHLORIDE 50 MG: 50 TABLET ORAL at 09:03

## 2020-03-02 RX ADMIN — GABAPENTIN 300 MG: 300 CAPSULE ORAL at 02:03

## 2020-03-02 RX ADMIN — DOCUSATE SODIUM - SENNOSIDES 1 TABLET: 50; 8.6 TABLET, FILM COATED ORAL at 09:03

## 2020-03-02 RX ADMIN — SODIUM CHLORIDE: 0.9 INJECTION, SOLUTION INTRAVENOUS at 12:03

## 2020-03-02 RX ADMIN — INSULIN ASPART 5 UNITS: 100 INJECTION, SOLUTION INTRAVENOUS; SUBCUTANEOUS at 06:03

## 2020-03-02 RX ADMIN — ENOXAPARIN SODIUM 40 MG: 100 INJECTION SUBCUTANEOUS at 06:03

## 2020-03-02 RX ADMIN — PANTOPRAZOLE SODIUM 40 MG: 40 TABLET, DELAYED RELEASE ORAL at 08:03

## 2020-03-02 RX ADMIN — INSULIN ASPART 10 UNITS: 100 INJECTION, SOLUTION INTRAVENOUS; SUBCUTANEOUS at 08:03

## 2020-03-02 RX ADMIN — HYDROXYZINE PAMOATE 25 MG: 25 CAPSULE ORAL at 04:03

## 2020-03-02 RX ADMIN — PIPERACILLIN AND TAZOBACTAM 4.5 G: 4; .5 INJECTION, POWDER, FOR SOLUTION INTRAVENOUS at 06:03

## 2020-03-02 RX ADMIN — INSULIN DETEMIR 20 UNITS: 100 INJECTION, SOLUTION SUBCUTANEOUS at 08:03

## 2020-03-02 NOTE — PLAN OF CARE
Problem: Adult Inpatient Plan of Care  Goal: Plan of Care Review  Outcome: Ongoing, Progressing     Recommendation:   1. Continue w/ current diet order     2. Encourage adequate intake of meals/ONS daily     Goals: 1. >75% of EEN, EPN by RD follow up.   Nutrition Goal Status: progressing towards goal  Communication of RD Recs: (POC)

## 2020-03-02 NOTE — PROGRESS NOTES
"[facility]  Adult Nutrition  Progress Note    SUMMARY       Recommendations    Recommendation:   1. Continue w/ current diet order     2. Encourage adequate intake of meals/ONS daily     Goals: 1. >75% of EEN, EPN by RD follow up.   Nutrition Goal Status: progressing towards goal  Communication of RD Recs: (POC)    Reason for Assessment    Reason For Assessment: RD follow-up  Diagnosis: (DKA)  Relevant Medical History: DM, HTN, pancreatitis, IVDU  Interdisciplinary Rounds: did not attend  General Information Comments:   3/2/20: Pt states he does not eat the food but he doesn't like it; pt also stated the  has been taking his order. Pt was not drinking boost glucose control because he thought it had too much sugar. RD explained to pt this product is specifically made for diabetic pts. Pt agreed to drink supplement. Pt c/o intermittent nausea, denies V/D/C. Pt continues to refuse nutrition education then stated "get out and let me eat." A1C 11.0 noted. New wt to be obtained.  2/24/20: Pt seen this afternoon while eating lunch; c/o chewing difficulty 2/2 poor dentition. Pt willing to have diet texture modified as well as to have ONS ordered.  Pt reports fluctuating wt for years he attributes to heroin abuse. States he has not used any in about a month. Pt also being treated for pna. NFPE performed today & pt w/ mild muscle wasting but adequate fat stores. Pt declined my offer to provide diet educ stating "I've had it for years. I know what to do."  A1C noted to be 11.0  Nutrition Discharge Planning: Adequate nutrition to meet needs via diabetic diet.     Nutrition Risk Screen    Nutrition Risk Screen: no indicators present    Nutrition/Diet History    Spiritual, Cultural Beliefs, Adventist Practices, Values that Affect Care: no  Food Allergies: NKFA  Factors Affecting Nutritional Intake: None identified at this time    Anthropometrics    Temp: 99.7 °F (37.6 °C)  Height Method: " "Stated  Height: 5' 8" (172.7 cm)  Height (inches): 68 in  Weight Method: Bed Scale  Weight: 57.4 kg (126 lb 8.7 oz)  Weight (lb): 126.55 lb  Ideal Body Weight (IBW), Male: 154 lb  % Ideal Body Weight, Male (lb): 82.18 %  BMI (Calculated): 19.2  BMI Grade: 18.5-24.9 - normal  Usual Body Weight (UBW), k kg  % Usual Body Weight: 103.25  % Weight Change From Usual Weight: 3.03 %     Lab/Procedures/Meds    Pertinent Labs Reviewed: reviewed  Pertinent Labs Comments: Na 134, Glu 304, Ca 7.5, A1C 11.0 ()   Pertinent Medications Reviewed: reviewed  Pertinent Medications Comments: statin, insulin, pantoprazole, KPhos, senna-docusate    Physical Findings/Assessment    Carlos Enrique Score 20     Estimated/Assessed Needs    Weight Used For Calorie Calculations: 57.4 kg (126 lb 8.7 oz)  Energy Calorie Requirements (kcal): 1741  Energy Need Method: [place] Junioror(x 1.25 (PAL))  Protein Requirements: 69-75 g (1.2-1.3 g/kg)  Weight Used For Protein Calculations: 57.4 kg (126 lb 8.7 oz)  Fluid Requirements (mL): 1mL/kcal or per MD  Estimated Fluid Requirement Method: RDA Method  RDA Method (mL): 1741  CHO Requirement: 215 g daily    Nutrition Prescription Ordered    Current Diet Order: 2000kcal DM diet; pureed   Oral Nutrition Supplement: Boost Glucose Control TID     Evaluation of Received Nutrient/Fluid Intake    Energy Calories Required: meeting needs  Protein Required: meeting needs  Fluid Required: meeting needs  Comments: LBM 3/2; good UOP   Tolerance: tolerating  % Intake of Estimated Energy Needs: 0 - 25 %   % Meal Intake: 0 - 25 %     Nutrition Risk    Level of Risk/Frequency of Follow-up: (F/u 1 x weekly)     Assessment and Plan    Nutrition Problem  Inadequate energy intake     Related to (etiology):   poor appetite pta     Signs and Symptoms (as evidenced by):   <85% of EEN/EPN being met     Interventions (treatment strategy):  Collaboration with other providers  Commercial beverage - boost glucose control TID "      Nutrition Diagnosis Status:   Improving    Monitor and Evaluation    Food and Nutrient Intake: energy intake, food and beverage intake  Food and Nutrient Adminstration: diet order  Knowledge/Beliefs/Attitudes: food and nutrition knowledge/skill  Physical Activity and Function: nutrition-related ADLs and IADLs  Anthropometric Measurements: weight, weight change  Biochemical Data, Medical Tests and Procedures: electrolyte and renal panel, glucose/endocrine profile  Nutrition-Focused Physical Findings: overall appearance     Malnutrition Assessment     Skin (Micronutrient): none   Orbital Region (Subcutaneous Fat Loss): well nourished  Upper Arm Region (Subcutaneous Fat Loss): well nourished  Thoracic and Lumbar Region: well nourished   Orthodox Region (Muscle Loss): mild depletion  Clavicle Bone Region (Muscle Loss): mild depletion  Clavicle and Acromion Bone Region (Muscle Loss): mild depletion  Dorsal Hand (Muscle Loss): mild depletion  Patellar Region (Muscle Loss): well nourished  Anterior Thigh Region (Muscle Loss): well nourished  Posterior Calf Region (Muscle Loss): well nourished   Edema (Fluid Accumulation): 0-->no edema present   Subcutaneous Fat Loss (Final Summary): well nourished  Muscle Loss Evaluation (Final Summary): mild protein-calorie malnutrition       Nutrition Follow-Up    RD Follow-up?: Yes

## 2020-03-02 NOTE — ASSESSMENT & PLAN NOTE
Per clinical presentation and imaging,will continue with IV Abx.no growth,repeat chest x ray show some improvement.

## 2020-03-02 NOTE — PROGRESS NOTES
Pharmacokinetic Assessment Follow Up: IV Vancomycin    Vancomycin serum concentration assessment(s):    The trough level was drawn correctly and can be used to guide therapy at this time. The measurement is within the desired definitive target range of 10 to 20 mcg/mL.    Vancomycin Regimen Plan:    Continue regimen to Vancomycin 1000 mg IV every 12 hours with next serum trough concentration measured at 05:30 prior to 4th dose on 3/3/20     Drug levels (last 3 results):  Recent Labs   Lab Result Units 02/29/20  0525 03/01/20  1534   Vancomycin, Random ug/mL 11.3  --    Vancomycin-Trough ug/mL  --  12.5       Pharmacy will continue to follow and monitor vancomycin.    Please contact pharmacy at extension [phone] for questions regarding this assessment.    Thank you for the consult,   Kelli Phillip       Patient brief summary:  Cali Mabry is a 54 y.o. male initiated on antimicrobial therapy with IV Vancomycin for treatment of lower respiratory infection    The patient's current regimen is 1000mg q12h    Drug Allergies:   Review of patient's allergies indicates:  No Known Allergies    Actual Body Weight: 57.4 kg    Renal Function:   Estimated Creatinine Clearance: 77.1 mL/min (based on SCr of 0.9 mg/dL).,     Dialysis Method (if applicable):  N/A    CBC (last 72 hours):  Recent Labs   Lab Result Units 02/28/20  0634 02/29/20  0525 03/01/20  0353   WBC K/uL 11.64 12.03 16.67*   Hemoglobin g/dL 9.6* 8.3* 8.6*   Hematocrit % 29.9* 26.6* 26.6*   Platelets K/uL 354* 334 427*   Gran% % 62.0 79.0* 80.7*   Lymph% % 15.0* 9.0* 10.0*   Mono% % 6.0 7.0 3.8*   Eosinophil% % 0.0 0.0 0.7   Basophil% % 0.0 0.0 0.2   Differential Method  Manual Manual Automated       Metabolic Panel (last 72 hours):  Recent Labs   Lab Result Units 02/27/20  2130 02/28/20  0634 02/29/20  0525 03/01/20  0353   Sodium mmol/L  --  133* 133* 138   Potassium mmol/L  --  3.8 3.9 3.2*   Chloride mmol/L  --  99 98 99   CO2 mmol/L  --  25 22* 29    Glucose mg/dL 603* 372* 357* 56*   BUN, Bld mg/dL  --  13 12 10   Creatinine mg/dL  --  0.8 0.8 0.9   Phosphorus mg/dL  --  2.7 2.8 3.2       Vancomycin Administrations:  vancomycin given in the last 96 hours                     vancomycin in dextrose 5 % 1 gram/250 mL IVPB 1,000 mg (mg) 1,000 mg New Bag 03/01/20 1759     1,000 mg New Bag  0645     1,000 mg New Bag 02/29/20 1710     1,000 mg New Bag  0555     1,000 mg New Bag 02/28/20 1718     1,000 mg New Bag  0419     1,000 mg New Bag 02/27/20 1630     1,000 mg New Bag  0506                      Microbiologic Results:  Microbiology Results (last 7 days)       Procedure Component Value Units Date/Time    Blood culture [[id number]] Collected:  03/01/20 0842    Order Status:  Completed Specimen:  Blood Updated:  03/01/20 1712     Blood Culture, Routine No Growth to date    Blood culture [[id number]] Collected:  03/01/20 0835    Order Status:  Completed Specimen:  Blood Updated:  03/01/20 1712     Blood Culture, Routine No Growth to date    Blood culture [[id number]] Collected:  02/23/20 1139    Order Status:  Completed Specimen:  Blood Updated:  02/28/20 1303     Blood Culture, Routine No growth after 5 days.    Blood culture [[id number]] Collected:  02/23/20 1140    Order Status:  Completed Specimen:  Blood Updated:  02/28/20 1303     Blood Culture, Routine No growth after 5 days.    Blood Culture #1 **CANNOT BE ORDERED STAT** [[id number]] Collected:  02/22/20 0540    Order Status:  Completed Specimen:  Blood from Line, Jugular, External Right Updated:  02/27/20 0703     Blood Culture, Routine No growth after 5 days.    Culture, Respiratory with Gram Stain [[id number]]  (Abnormal)  (Susceptibility) Collected:  02/23/20 0200    Order Status:  Completed Specimen:  Respiratory from Sputum, Expectorated Updated:  02/25/20 1011     Respiratory Culture ESCHERICHIA COLI  Many        METHICILLIN RESISTANT STAPHYLOCOCCUS AUREUS  Many       Gram Stain (Respiratory) <10 epithelial  cells per low power field.     Gram Stain (Respiratory) Moderate WBC's     Gram Stain (Respiratory) Moderate Gram positive cocci in clusters     Gram Stain (Respiratory) Few Gram positive rods    Blood Culture #2 **CANNOT BE ORDERED STAT** [[id number]]  (Abnormal)  (Susceptibility) Collected:  02/22/20 0600    Order Status:  Completed Specimen:  Blood from Peripheral, Antecubital, Left Updated:  02/24/20 0752     Blood Culture, Routine Gram stain edison bottle: Gram positive cocci in clusters resembling Staph       02/22/2020  22:04        Results called to and read back by: BRITT FRAUSTO/[id number]      Gram stain edison bottle: Gram positive cocci in clusters resembling Staph       Positive results previously called      METHICILLIN RESISTANT STAPHYLOCOCCUS AUREUS  Called to Niharika Ibanez- 4JOSE ANTONIO  02/24/2020  07:52

## 2020-03-02 NOTE — ASSESSMENT & PLAN NOTE
he is septic with fever,leucocytiosis,tachycardia,tachypnea,likely duo to pneumonia.repeated blood culture.on IV Abx,fever is improved,no growth in repeat blood culture.

## 2020-03-02 NOTE — NURSING
Dr. Wynn notified of blood pressure 130/68, temp 99.4 and HR of 121, no new orders. Will continue to monitor.

## 2020-03-02 NOTE — PLAN OF CARE
03/02/20 1532   Discharge Reassessment   Assessment Type Discharge Planning Reassessment   Provided patient/caregiver education on the expected discharge date and the discharge plan Yes   Do you have any problems affording any of your prescribed medications? No   Discharge Plan A Skilled Nursing Facility   Discharge Plan B Home Health   DME Needed Upon Discharge  none   Patient choice form signed by patient/caregiver Yes   Anticipated Discharge Disposition SNF   Can the patient/caregiver answer the patient profile reliably? Yes, cognitively intact   How does the patient rate their overall health at the present time? Poor   Describe the patient's ability to walk at the present time. Minor restrictions or changes   How often would a person be available to care for the patient? Often   Number of comorbid conditions (as recorded on the chart) Two   During the past month, has the patient often been bothered by feeling down, depressed or hopeless? No   During the past month, has the patient often been bothered by little interest or pleasure in doing things? No   Post-Acute Status   Post-Acute Placement Status Pending Payor Review   Discharge Delays None known at this time     CM/TN met with patient to address discharge plan and needs; pt informed of plan to discharge to SNF either Thursday or Friday; pt was provided with name, address and phone number to [facility] and is in agreement with plan;

## 2020-03-02 NOTE — PLAN OF CARE
Problem: Occupational Therapy Goal  Goal: Occupational Therapy Goal  Description  Goals to be met by: 3/15/2020    Patient will increase functional independence with ADLs by performing:    Feeding with [place].  UE Dressing with [place].  Grooming while standing at sink with Contact Guard Assistance.  Toileting from toilet with Contact Guard Assistance for hygiene and clothing management.   Supine to sit with Modified [place].  Step transfer with Contact Guard Assistance  Toilet transfer to toilet with Contact Guard Assistance.  Upper extremity exercise program x15 reps per handout, with independence.      Outcome: Ongoing, Progressing  Patient agreeable to EOB activity for OT treatment today. Patient would benefit from continued OT services to address functional deficits.

## 2020-03-02 NOTE — SUBJECTIVE & OBJECTIVE
Past Medical History:   Diagnosis Date    Diabetes mellitus     Hepatitis     HTN (hypertension)     Pancreatitis        Past Surgical History:   Procedure Laterality Date    CARDIAC SURGERY  1999    stent placed. ([facility])    SHOULDER SURGERY  2013    right shoulder, spur removal.       Review of patient's allergies indicates:  No Known Allergies    No current facility-administered medications on file prior to encounter.      Current Outpatient Medications on File Prior to Encounter   Medication Sig    atorvastatin (LIPITOR) 40 MG tablet Take 40 mg by mouth once daily.    gabapentin (NEURONTIN) 300 MG capsule Take 300 mg by mouth.    ibuprofen (ADVIL,MOTRIN) 600 MG tablet Take 1 tablet (600 mg total) by mouth every 6 (six) hours as needed for Pain.    insulin aspart U-100 (NOVOLOG) 100 unit/mL (3 mL) InPn pen Inject 10 Units into the skin.    LANTUS 100 unit/mL injection Inject 15 Units into the skin 2 (two) times daily. (Patient taking differently: Inject 30 Units into the skin once daily. )    naloxone (NARCAN) 1 mg/mL injection 2 mg (1 mg per nostril) by Nasal route as needed for opioid overdose; may repeat in 3 to 5 minutes if not effective. Call 911    naloxone (NARCAN) 4 mg/actuation Spry 4mg by nasal route as needed for opioid overdose; may repeat every 2-3 minutes in alternating nostrils until medical help arrives. Call 911    nicotine (NICODERM CQ) 21 mg/24 hr Place 1 patch onto the skin once daily.    omeprazole (PRILOSEC) 40 MG capsule Take 1 capsule (40 mg total) by mouth once daily.     Family History     Problem Relation (Age of Onset)    Asthma Mother        Tobacco Use    Smoking status: Current Every Day Smoker     Packs/day: 1.00     Years: 20.00     Pack years: 20.00     Types: Cigarettes     Start date: 4/3/1981    Smokeless tobacco: Never Used   Substance and Sexual Activity    Alcohol use: No     Alcohol/week: 0.0 standard drinks    Drug use: Yes     Types: IV,  Marijuana, Heroin     Comment: currently, used today, herion    Sexual activity: Yes     Partners: Female     Review of Systems   Constitutional: Positive for activity change and appetite change.   HENT: Negative for congestion and dental problem.    Eyes: Negative for discharge and itching.   Respiratory: Positive for cough. Negative for apnea and chest tightness.    Cardiovascular: Negative for chest pain and leg swelling.   Gastrointestinal: Negative for abdominal pain, nausea and vomiting.   Endocrine: Negative for cold intolerance.   Genitourinary: Negative for difficulty urinating and dysuria.   Musculoskeletal: Negative for arthralgias and back pain.   Skin: Negative for color change and pallor.   Allergic/Immunologic: Negative for environmental allergies and food allergies.   Neurological: Negative for dizziness and facial asymmetry.   Hematological: Does not bruise/bleed easily.   Psychiatric/Behavioral: Negative for agitation.     Objective:     Vital Signs (Most Recent):  Temp: 99.9 °F (37.7 °C) (03/02/20 0718)  Pulse: (!) 119 (03/02/20 0718)  Resp: 18 (03/02/20 0718)  BP: 116/62 (03/02/20 0718)  SpO2: 96 % (03/02/20 0718) Vital Signs (24h Range):  Temp:  [98.4 °F (36.9 °C)-100.6 °F (38.1 °C)] 99.9 °F (37.7 °C)  Pulse:  [] 119  Resp:  [16-32] 18  SpO2:  [94 %-99 %] 96 %  BP: ()/(49-70) 116/62     Weight: 57.4 kg (126 lb 8.7 oz)  Body mass index is 19.24 kg/m².    Physical Exam   Constitutional: He is oriented to person, place, and time. No distress.   HENT:   Head: Atraumatic.   Eyes: Pupils are equal, round, and reactive to light. EOM are normal.   Neck: Normal range of motion. Neck supple.   Cardiovascular: Normal rate and regular rhythm.   Pulmonary/Chest: Effort normal.   Abdominal: Soft. He exhibits no distension.   Musculoskeletal: Normal range of motion. He exhibits no edema or deformity.   Neurological: He is oriented to person, place, and time. No cranial nerve deficit. Coordination  normal.   Skin: Skin is warm and dry.   Psychiatric: He has a normal mood and affect. His behavior is normal.         CRANIAL NERVES     CN III, IV, VI   Pupils are equal, round, and reactive to light.  Extraocular motions are normal.        Significant Labs:   CBC:   Recent Labs   Lab 03/01/20  0353 03/02/20  0508   WBC 16.67* 19.97*   HGB 8.6* 7.8*   HCT 26.6* 25.4*   * 456*     CMP:   Recent Labs   Lab 03/01/20  0353 03/02/20  0508    134*   K 3.2* 4.4   CL 99 99   CO2 29 24   GLU 56* 304*   BUN 10 12   CREATININE 0.9 0.9   CALCIUM 8.0* 7.5*   ANIONGAP 10 11   EGFRNONAA >60 >60       Significant Imaging: reviewed.

## 2020-03-02 NOTE — PT/OT/SLP PROGRESS
Occupational Therapy   Treatment    Name: Cali Mabry  MRN: [id number]  Admitting Diagnosis:  Diabetic ketoacidosis without coma associated with diabetes mellitus due to underlying condition       Recommendations:     Discharge Recommendations: nursing facility, skilled  Discharge Equipment Recommendations:  none  Barriers to discharge:  Decreased caregiver support    Assessment:     Cali Mabry is a 54 y.o. male with a medical diagnosis of Diabetic ketoacidosis without coma associated with diabetes mellitus due to underlying condition. Performance deficits affecting function are weakness, impaired functional mobilty, decreased safety awareness, impaired cardiopulmonary response to activity, impaired endurance, gait instability, decreased coordination, impaired balance, decreased upper extremity function, decreased lower extremity function, impaired skin, impaired self care skills, decreased ROM. Patient agreeable to EOB activity for OT treatment today. Patient c/o dizziness and demo'd backward lean w/ activity seated EOB throughout session. Patient able to correct lean w/ VC.     Rehab Prognosis:  Good; patient would benefit from acute skilled OT services to address these deficits and reach maximum level of function.       Plan:     Patient to be seen 5 x/week to address the above listed problems via self-care/home management, therapeutic activities, therapeutic exercises  · Plan of Care Expires: 03/15/20  · Plan of Care Reviewed with: patient    Subjective     Pain/Comfort:  · Pain Rating 1: 0/10(c/o dizziness)    Objective:     Communicated with: nurse prior to session.  Patient found HOB elevated with oxygen, peripheral IV, telemetry upon OT entry to room.    General Precautions: Standard, fall, contact, pureed diet   Orthopedic Precautions:N/A   Braces: N/A     Occupational Performance:     Bed Mobility:    · Patient completed Rolling/Turning to Left with  stand by assistance, with side rail and HOB  elevated and increased time  · Patient completed Rolling/Turning to Right with stand by assistance, with side rail and HOB elevated and increased time  · Patient completed Scooting/Bridging with contact guard assistance and with side rail  · Patient completed Supine to Sit with stand by assistance, with side rail and HOB elevated and increased time  · Patient completed Sit to Supine with stand by assistance, with side rail and HOB elevated and increased time     Functional Mobility/Transfers:  · Patient completed Sit <> Stand Transfer with contact guard assistance  with  no assistive device   · Functional Mobility: Patient completed sit>stand t/f CGA w/ no AD to briefly adjust back gown.     Activities of Daily Living:  · Grooming: stand by assistance and set-up assistance to wash face and hands and complete mouth care seated EOB  · Upper Body Dressing: moderate assistance to don back gown seated EOB. Patient limited 2* lines.      [facility] 6 Click ADL: 16    Treatment & Education:  Patient re-educated on OT role/POC. Patient completed mobility and self-care tasks with assistance as noted above. Patient completed BUE HEP exercises x5 with yellow theraband:  -shoulder flex/ext x5  -shoulder horizontal ab/adduction x5  -shoulder external rotation x5  -elbow flex x5  -elbow ext x5  Patient educated on BUE HEP exercises via demo and handout. Handout and theraband placed in blue folder per patient request. Patient educated on importance of OOB activity. Patient educated to notify nurse for assistance as needed.     Patient left HOB elevated with all lines intact, call button in reach, bed alarm on and nurse notifiedEducation:      GOALS:   Multidisciplinary Problems     Occupational Therapy Goals        Problem: Occupational Therapy Goal    Goal Priority Disciplines Outcome Interventions   Occupational Therapy Goal     OT, PT/OT Ongoing, Progressing    Description:  Goals to be met by: 3/15/2020    Patient will increase  functional independence with ADLs by performing:    Feeding with [place].  UE Dressing with [place].  Grooming while standing at sink with Contact Guard Assistance.  Toileting from toilet with Contact Guard Assistance for hygiene and clothing management.   Supine to sit with Modified [place].  Step transfer with Contact Guard Assistance  Toilet transfer to toilet with Contact Guard Assistance.  Upper extremity exercise program x15 reps per handout, with independence.                       Time Tracking:     OT Date of Treatment: 03/02/20  OT Start Time: 1435  OT Stop Time: 1502  OT Total Time (min): 27 min    Billable Minutes:Self Care/Home Management 15  Therapeutic Exercise 12  Total Time 27    HUMBERTO Cornejo  3/2/2020

## 2020-03-02 NOTE — PLAN OF CARE
Problem: Physical Therapy Goal  Goal: Physical Therapy Goal  Description  Goals to be met by: 3/15/2020     Patient will increase functional independence with mobility by performin. Sit to stand transfer with Modified [place]  2. Gait  x 150 feet with Modified [place] with or without AD   3 Lower extremity exercise program x10 reps per handout, with independence     Outcome: Ongoing, Progressing   Pt strongly educated on role of PT and encouragement for participation. Pt with reports of increased fatigue and dizziness upon entering room.  Pt educated on HEP handout, energy conservation and safety at home.  Strongly educated pt on importance of OOB activity to improve endurance and functional mobility.  Please encourage pt to sit [facility] for meals.

## 2020-03-02 NOTE — PT/OT/SLP PROGRESS
Physical Therapy Treatment    Patient Name:  Cali Mabry   MRN:  [id number]    Recommendations:     Discharge Recommendations:  nursing facility, skilled   Discharge Equipment Recommendations: shower chair   Barriers to discharge: Decreased caregiver support, pt was completely independent PTA and is now severely deconditioned 2/2 prolonged hospital stay.  Pt is at high risk for falls and re-admission if he returns to prior living arrangements at present time    Assessment:     Cail Mabry is a 54 y.o. male admitted with a medical diagnosis of Diabetic ketoacidosis without coma associated with diabetes mellitus due to underlying condition.  He presents with the following impairments/functional limitations:  weakness, impaired endurance, impaired functional mobilty, decreased coordination, impaired self care skills, impaired balance, gait instability, decreased lower extremity function, decreased safety awareness, impaired cardiopulmonary response to activity.  Pt strongly educated on role of PT and encouragement for participation. Pt with reports of increased fatigue and dizziness upon entering room.  Pt educated on HEP handout, energy conservation and safety at home.  Strongly educated pt on importance of OOB activity to improve endurance and functional mobility.  Please encourage pt to sit [facility] for meals.     Rehab Prognosis: Fair; patient would benefit from acute skilled PT services to address these deficits and reach maximum level of function.    Recent Surgery: * No surgery found *      Plan:     During this hospitalization, patient to be seen (5-6x/wk) to address the identified rehab impairments via gait training, therapeutic activities, therapeutic exercises and progress toward the following goals:    · Plan of Care Expires:  03/15/20    Subjective     Chief Complaint: dizzine/fatigue  Patient/Family Comments/goals: Pt reports he feels dizzy and not up for it today.  Pain/Comfort:  · Pain Rating  1: 0/10(c/o increased dizziness and room spinning)      Objective:     Communicated with pt's nurse, Luli, prior to session.  Patient found HOB elevated with oxygen, peripheral IV, telemetry upon PT entry to room.     General Precautions: Standard, fall, contact, pureed diet   Orthopedic Precautions:N/A   Braces: N/A     Functional Mobility:  · Bed Mobility:     · Scooting: SBA to scoot to HOB       AM-PAC 6 CLICK MOBILITY  Turning over in bed (including adjusting bedclothes, sheets and blankets)?: 3  Sitting down on and standing up from a chair with arms (e.g., wheelchair, bedside commode, etc.): 3  Moving from lying on back to sitting on the side of the bed?: 3  Moving to and from a bed to a chair (including a wheelchair)?: 3  Need to walk in hospital room?: 3  Climbing 3-5 steps with a railing?: 2  Basic Mobility Total Score: 17       Therapeutic Activities and Exercises:  Pt educated on importance of OOB mobility to improve functional mobility, endurance and lung expansion 2/2 PNA and lengthy hospital stay.  Pt educated on role of PT and strongly encouraged participation.  Pt received handouts on HEP, energy conservation and home safety.  Omitted bridges due to heel ulcer and educated pt on lifting LE for supine Hip abduction/adduction and heel slides to decrease shearing forces.  Pt educated on shearing forces and verbalized understanding for all of the aforementioned.  Pt performed supine therex x15 reps AROM: glut sets, quad sets and ankle pumps.  Encouraged to perform throughout the day.  Pt declined any further exercises at this time.  Encouraged pt to get to [facility] tomorrow, pt verbalized understanding.      Patient left HOB elevated, heels elevated  with all lines intact, call button in reach, bed alarm on and pt's nurse, Luli notified..    GOALS:   Multidisciplinary Problems     Physical Therapy Goals        Problem: Physical Therapy Goal    Goal Priority Disciplines Outcome Goal Variances  Interventions   Physical Therapy Goal     PT, PT/OT      Description:  Goals to be met by: 3/15/2020     Patient will increase functional independence with mobility by performin. Sit to stand transfer with Modified [place]  2. Gait  x 150 feet with Modified [place] with or without AD   3 Lower extremity exercise program x10 reps per handout, with independence                      Time Tracking:     PT Received On: 20  PT Start Time: 1515     PT Stop Time: 1529  PT Total Time (min): 14 min     Billable Minutes: Therapeutic Activity 14    Treatment Type: Treatment  PT/PTA: PTA     PTA Visit Number: 1     Rachell Valencia, PTA  2020

## 2020-03-02 NOTE — PT/OT/SLP PROGRESS
Speech Language Pathology Treatment    Patient Name:  Cali Mabry   MRN:  [id number]  Admitting Diagnosis: Diabetic ketoacidosis without coma associated with diabetes mellitus due to underlying condition    Recommendations:                 General Recommendations:  GI evaluation  Diet recommendations:  Puree, Liquid Diet Level: Thin   Aspiration Precautions: 1 bite/sip at a time   General Precautions: Standard, pureed diet  Communication strategies:  none    Subjective   Pt's mother present for session, Pt and mother arguing throughout. Pt reporting gradual worsening of dysphagia of solids over the last about year, he is unable to clearly report timeframe. He denies dysphagia with liquids. Frequent sensation of upper esophageal stasis of solids.   Patient goals: d/c admit    Pain/Comfort:  · Pain Rating 1: 0/10    Objective:     Has the patient been evaluated by SLP for swallowing?   Yes  Keep patient NPO? No   Current Respiratory Status: room air      Pt repositioned upright in bed for trials of thin liquids and puree. Solid was not attempted secondary to variable performance with solids. Thin liquids over verenice. 8oz via straw revealed impulsive intake amount, timely swallow, no overt s/s of aspiration. Intake of puree was unremarkable. ST spoke with MD regarding Pt's progressive difficulty with solids and ST recommendation for GI consult.     Assessment:     Cali Mabry is a 54 y.o. male with dx of Diabetic ketoacidosis without coma associated with diabetes mellitus due to underlying condition he presents with functional oral mech and pharyngeal phases of swallow at bedside and possible s/s of esophogeal dysphagia.     Goals:   Multidisciplinary Problems     SLP Goals        Problem: SLP Goal    Goal Priority Disciplines Outcome   SLP Goal     SLP Ongoing, Progressing   Description:  1. Pt will tolerate puree diet and TL without overt s/s of aspiration.   2. Pt will demonstrate efficient A-P  transport/deglutition of soft solid item x3 sessions.                     Plan:     · Patient to be seen:  3 x/week   · Plan of Care expires:  03/13/20  · Plan of Care reviewed with:    MD  · SLP Follow-Up:  Yes       Discharge recommendations:  (TBD)   Barriers to Discharge:  None    Time Tracking:     SLP Treatment Date:   03/02/20  Speech Start Time:  1050  Speech Stop Time:  1100     Speech Total Time (min):  10 min    Billable Minutes: Treatment Swallowing Dysfunction 10    Becca Davis CCC-SLP  03/02/2020

## 2020-03-02 NOTE — PROGRESS NOTES
[facility] Hospital Medicine  Progress Note    Patient Name: Cali Mabry  MRN: [id number]  Patient Class: IP- Inpatient   Admission Date: 2/22/2020  Length of Stay: 9 days  Attending Physician: Kalyani Alberto MD  Primary Care Provider: Primary Doctor No        Subjective:     Principal Problem:Diabetic ketoacidosis without coma associated with diabetes mellitus due to underlying condition        HPI:  54-year-old male past medical history diabetes, hepatitis, hypertension history of polysubstance abuse, pancreatitis presents with abdominal pain for the last 2 days with nausea and vomiting home, and worsening body aches over the last day.  Patient reports he has not had his insulin in some time and reports his glucose is very high.  He states using IV heroin last use few days ago reportedly.  He reports coughing significant amount denies any phlegm production, denies any blood in his sputum, or any chest pain. Denies any blood in her stool, reports normal last bowel movement, reports he is urinating as normal and reports he is extremely thirsty and hungry.  Denies any fevers/chills, no extremity weakness/numbness/tingling.he is in DKA with blood sugar of 635,AG of 26 and CO 2 of 6,he has benen started on DKA protocol,RAMYA with CRT of 1.7,chest  Ray show also sign of pneumonia,patient has been started on broad spectrum IV Abx afterr bload culture is done,    Overview/Hospital Course:  54-year-old male past medical history diabetes, hepatitis, hypertension history of polysubstance abuse,IV heroin,chronic  pancreatitis presents with abdominal pain for the last 2 days with nausea and vomiting home, and worsening body aches over the last day.  Patient reports he has not had his insulin in some time and reports his glucose is very high.  He states using IV heroin last use few days ago reportedly.  He reports coughing significant amount denies any phlegm production, denies any blood in his  sputum, or any chest pain. Denies any blood in her stool, reports normal last bowel movement, reports he is urinating as normal and reports he is extremely thirsty and hungry.  Denies any fevers/chills, no extremity weakness/numbness/tingling.he is in DKA with blood sugar of 635,AG of 26 and CO 2 of 6,he has been  started on DKA protocol,RAMYA with CRT of 1.7,chest  ray show also sign of pneumonia,patient has been started on broad spectrum IV Abx afterr bload culture is done,  AG is closed,started on SQ insulin,started on diet.  Has bacteremia,staph aureus,likely from IV heroin use,alreday on Vanc.repeated blood culture,Echo to R/Oed out  endocarditis.  On Zosyn and azithromycin for pneumonia.  Repeat blood culture no growth,consulted SW for LTAC placement,he is IV drug user,can not go home.reperat blood culture show growth,will place picc line.  Afrberile,no place in LTAC yet.  Podiatry cleaned right heel callus,  Afebrile.  Require more oxygen supplement,chest X ray show possible aspiration,adjusted Abx,changed Rocephin to zosyn,added IS,aspiration precaution,consulted ST.on puree diet,oxygenation is improved,but he is septic with fever,leucocytiosis,tachycardia,tachypnea,likely duo to pneumonia.repeated blood culture.no growth,repeat chest x ray show some improvement.  Consulted PT,OT.      Past Medical History:   Diagnosis Date    Diabetes mellitus     Hepatitis     HTN (hypertension)     Pancreatitis        Past Surgical History:   Procedure Laterality Date    CARDIAC SURGERY  1999    stent placed. ([facility])    SHOULDER SURGERY  2013    right shoulder, spur removal.       Review of patient's allergies indicates:  No Known Allergies    No current facility-administered medications on file prior to encounter.      Current Outpatient Medications on File Prior to Encounter   Medication Sig    atorvastatin (LIPITOR) 40 MG tablet Take 40 mg by mouth once daily.    gabapentin (NEURONTIN) 300 MG capsule  Take 300 mg by mouth.    ibuprofen (ADVIL,MOTRIN) 600 MG tablet Take 1 tablet (600 mg total) by mouth every 6 (six) hours as needed for Pain.    insulin aspart U-100 (NOVOLOG) 100 unit/mL (3 mL) InPn pen Inject 10 Units into the skin.    LANTUS 100 unit/mL injection Inject 15 Units into the skin 2 (two) times daily. (Patient taking differently: Inject 30 Units into the skin once daily. )    naloxone (NARCAN) 1 mg/mL injection 2 mg (1 mg per nostril) by Nasal route as needed for opioid overdose; may repeat in 3 to 5 minutes if not effective. Call 911    naloxone (NARCAN) 4 mg/actuation Spry 4mg by nasal route as needed for opioid overdose; may repeat every 2-3 minutes in alternating nostrils until medical help arrives. Call 911    nicotine (NICODERM CQ) 21 mg/24 hr Place 1 patch onto the skin once daily.    omeprazole (PRILOSEC) 40 MG capsule Take 1 capsule (40 mg total) by mouth once daily.     Family History     Problem Relation (Age of Onset)    Asthma Mother        Tobacco Use    Smoking status: Current Every Day Smoker     Packs/day: 1.00     Years: 20.00     Pack years: 20.00     Types: Cigarettes     Start date: 4/3/1981    Smokeless tobacco: Never Used   Substance and Sexual Activity    Alcohol use: No     Alcohol/week: 0.0 standard drinks    Drug use: Yes     Types: IV, Marijuana, Heroin     Comment: currently, used today, herion    Sexual activity: Yes     Partners: Female     Review of Systems   Constitutional: Positive for activity change and appetite change.   HENT: Negative for congestion and dental problem.    Eyes: Negative for discharge and itching.   Respiratory: Positive for cough. Negative for apnea and chest tightness.    Cardiovascular: Negative for chest pain and leg swelling.   Gastrointestinal: Negative for abdominal pain, nausea and vomiting.   Endocrine: Negative for cold intolerance.   Genitourinary: Negative for difficulty urinating and dysuria.   Musculoskeletal: Negative for  arthralgias and back pain.   Skin: Negative for color change and pallor.   Allergic/Immunologic: Negative for environmental allergies and food allergies.   Neurological: Negative for dizziness and facial asymmetry.   Hematological: Does not bruise/bleed easily.   Psychiatric/Behavioral: Negative for agitation.     Objective:     Vital Signs (Most Recent):  Temp: 99.9 °F (37.7 °C) (03/02/20 0718)  Pulse: (!) 119 (03/02/20 0718)  Resp: 18 (03/02/20 0718)  BP: 116/62 (03/02/20 0718)  SpO2: 96 % (03/02/20 0718) Vital Signs (24h Range):  Temp:  [98.4 °F (36.9 °C)-100.6 °F (38.1 °C)] 99.9 °F (37.7 °C)  Pulse:  [] 119  Resp:  [16-32] 18  SpO2:  [94 %-99 %] 96 %  BP: ()/(49-70) 116/62     Weight: 57.4 kg (126 lb 8.7 oz)  Body mass index is 19.24 kg/m².    Physical Exam   Constitutional: He is oriented to person, place, and time. No distress.   HENT:   Head: Atraumatic.   Eyes: Pupils are equal, round, and reactive to light. EOM are normal.   Neck: Normal range of motion. Neck supple.   Cardiovascular: Normal rate and regular rhythm.   Pulmonary/Chest: Effort normal.   Abdominal: Soft. He exhibits no distension.   Musculoskeletal: Normal range of motion. He exhibits no edema or deformity.   Neurological: He is oriented to person, place, and time. No cranial nerve deficit. Coordination normal.   Skin: Skin is warm and dry.   Psychiatric: He has a normal mood and affect. His behavior is normal.         CRANIAL NERVES     CN III, IV, VI   Pupils are equal, round, and reactive to light.  Extraocular motions are normal.        Significant Labs:   CBC:   Recent Labs   Lab 03/01/20 0353 03/02/20  0508   WBC 16.67* 19.97*   HGB 8.6* 7.8*   HCT 26.6* 25.4*   * 456*     CMP:   Recent Labs   Lab 03/01/20 0353 03/02/20  0508    134*   K 3.2* 4.4   CL 99 99   CO2 29 24   GLU 56* 304*   BUN 10 12   CREATININE 0.9 0.9   CALCIUM 8.0* 7.5*   ANIONGAP 10 11   EGFRNONAA >60 >60       Significant Imaging:  reviewed.      Assessment/Plan:      * Diabetic ketoacidosis without coma associated with diabetes mellitus due to underlying condition  DKA resolved  Now with hypoglycemia  Has received D50, patient eating well  If hypoglycemia persist, will start D51/2NS      Sepsis   he is septic with fever,leucocytiosis,tachycardia,tachypnea,likely duo to pneumonia.repeated blood culture.on IV Abx,fever is improved,no growth in repeat blood culture.      Acute respiratory failure with hypoxia  Duo to pneumonia,Require more oxygen supplement,chest X ray show possible aspiration,adjusted Abx,chnaged Rocephin to zosyn,added IS,aspiration precaution,consulted ST.on puree diet,oxygenation is improving slowly.      MRSA bacteremia  Blood cultures (+) MRSA  Likely from IV heroin use  Continue Vancomycin  Blood cultures repeated  Echo performed: WNL  Repeat blood culture no growth,consulted SW for LTAC placement,he is IV drug user,can not go home.reperat blood culture show growth, placed picc line.    Afrberile,no place in LTAC yet.  Afebrile.        Pneumonia due to infectious organism  Per clinical presentation and imaging,will continue with IV Abx.no growth,repeat chest x ray show some improvement.      Chronic hepatitis C  Will monitor.      Intravenous drug abuse  Consult on IV heroin cessation use  Patient states that he quit 3-4 weeks ago      Hyperlipidemia  On statin.      Pancreatitis, chronic  Stable at this  time.      Essential hypertension  Stable at this time with no meds  Will monitor throughout hospital course    Type 2 diabetes mellitus, uncontrolled  Currently hypoglycemia  Will monitor blood glucose q.a.c. and HS  Will adjust regimen based on blood glucose trends        VTE Risk Mitigation (From admission, onward)         Ordered     enoxaparin injection 40 mg  Daily      02/22/20 1157     IP VTE HIGH RISK PATIENT  Once      02/22/20 1157     Place DAWIT hose  Until discontinued      02/22/20 1157                       Kalyani Alberto MD  Department of Hospital Medicine   [facility]

## 2020-03-02 NOTE — NURSING
Patient complaining of chest pain brought on by his cough. He has been refusing the cough meds because the taste makes him nauseated - notified  if we may change to Payton Sawyer. Also patients blood pressure now 135/70 post Bolus and starting continuing IV fluids.

## 2020-03-02 NOTE — CONSULTS
.[facility]  Gastroenterology  Consult Note    Patient Name: Cali Mabry  MRN: [id number]  Admission Date: 2/22/2020  Hospital Length of Stay: 9 days  Code Status: Full Code   Primary Care Physician: Primary Doctor No  Principal Problem:Diabetic ketoacidosis without coma associated with diabetes mellitus due to underlying condition    Consults  Subjective:     Chief complaint: Dysphagia.    HPI: The patient is a 54 year old male with a history of HTN, DM, hepatitis C and alcoholic pancreatitis presenting with DKA, MRSA bacteremia and pneumonia.  GI consulted for dysphagia.  The patient reports intermittent dysphagia to certain solid foods, particularly meats.  He states this is mainly an issue when he is not wearing his dentures.  According to the nurse, there is concern that the patient is aspirating; however, he showed no signs of aspirating during his bedside swallow study earlier today.  He denies issues swallowing liquids or pills and currently denies issues with solids.  No odynophagia.  He denies abdominal pain, nausea or vomiting.     Past medical history:  Hypertension.  Diabetes mellitus, type 2.  Hepatitis C.  Alcoholic pancreatitis.    Past surgical history:  Right shoulder spur removal.  Percutaneous coronary intervention.    Social history:  Tobacco use: former smoker; quit 2 months ago.  Alcohol use: former alcohol abuse; quit 2 months ago.  Illicit drug use: heroin.    Family history:  No family history of liver or colon cancer.    Medications:  Medications Prior to Admission   Medication Sig Dispense Refill Last Dose    atorvastatin (LIPITOR) 40 MG tablet Take 40 mg by mouth once daily.   Unknown at Unknown time    gabapentin (NEURONTIN) 300 MG capsule Take 300 mg by mouth.   Unknown at Unknown time    ibuprofen (ADVIL,MOTRIN) 600 MG tablet Take 1 tablet (600 mg total) by mouth every 6 (six) hours as needed for Pain. 20 tablet 0 Unknown at Unknown time    insulin aspart  U-100 (NOVOLOG) 100 unit/mL (3 mL) InPn pen Inject 10 Units into the skin.   Unknown at Unknown time    LANTUS 100 unit/mL injection Inject 15 Units into the skin 2 (two) times daily. (Patient taking differently: Inject 30 Units into the skin once daily. ) 9 mL 0 Past Week    naloxone (NARCAN) 1 mg/mL injection 2 mg (1 mg per nostril) by Nasal route as needed for opioid overdose; may repeat in 3 to 5 minutes if not effective. Call 911 2 mL 11 Unknown at Unknown time    naloxone (NARCAN) 4 mg/actuation Spry 4mg by nasal route as needed for opioid overdose; may repeat every 2-3 minutes in alternating nostrils until medical help arrives. Call 911 1 each 11 Unknown at Unknown time    nicotine (NICODERM CQ) 21 mg/24 hr Place 1 patch onto the skin once daily.  0 Unknown at Unknown time    omeprazole (PRILOSEC) 40 MG capsule Take 1 capsule (40 mg total) by mouth once daily. 30 capsule 11 4/18/2019       Allergies:  No known drug allergies.    Review of systems:  CONSTITUTIONAL: Negative for fever, chills, weakness, weight loss, weight gain.  HEENT: Negative for blurred vision, hearing loss, nasal congestion, dry mouth, sore throat.  CARDIOVASCULAR: Negative for chest pain or palpitations.  RESPIRATORY: Negative for SOB or cough.  GASTROINTESTINAL: See HPI  GENITOURINARY: Negative for dysuria or hematuria.  MUSCULOSKELETAL: Negative for osteoarthritis or muscle pain.  SKIN: Negative for rashes/lesions.  NEUROLOGIC: Negative for headaches, numbness/tingling.  ENDOCRINE: Positive for diabetes. No thyroid abnormalities.  HEMATOLOGIC: Negative for anemia or blood dyscrasias.    Objective:     Vital Signs (Most Recent):  Temp: 99.7 °F (37.6 °C) (03/02/20 1205)  Pulse: 110 (03/02/20 1205)  Resp: 18 (03/02/20 1205)  BP: (!) 112/55 (03/02/20 1205)  SpO2: (!) 92 % (03/02/20 1205) Vital Signs (24h Range):  Temp:  [98.4 °F (36.9 °C)-99.9 °F (37.7 °C)] 99.7 °F (37.6 °C)  Pulse:  [] 110  Resp:  [16-28] 18  SpO2:  [92 %-99 %]  92 %  BP: ()/(52-70) 112/55     Physical examination:  General: Thin WM in no apparent distress.  HENT: NCAT, atraumatic, hearing grossly intact, no visible or palpable thyroid mass  Eyes: PERRL, EOMI, anicteric sclera  Cardiovascular: Regular rate and rhythm. No peripheral edema.   Lungs: Non-labored respirations. Breath sounds equal.   Abdomen: soft, NTND, normoactive BS  Extremities: No C/C, 2+ dorsalis pedis pulses bilaterally  Neuro: AA&O x 3, no asterixes or tremors  Psych: Appropriate mood and affect. No SI.  Skin: No jaundice, rashes or lesions  Musculoskeletal: 5/5 strength bilaterally    CBC:   Recent Labs   Lab 03/01/20  0353 03/02/20  0508   WBC 16.67* 19.97*   HGB 8.6* 7.8*   HCT 26.6* 25.4*   * 456*       Assessment:   54 year old male with a history of HTN, DM, hepatitis C and alcoholic pancreatitis presenting with DKA, MRSA bacteremia and pneumonia.  GI consulted for dysphagia and concern for aspiration, though patient currently denies issues swallowing and no evidence of aspiration during bedside swallow exam this morning.  Speech Therapy suspicious of esophageal source of dysphagia.    Plan:   Discussed workup for dysphagia with patient in detail, including EGD vs. esophagram.  He is currently refusing any further testing and states that he is asymptomatic.  If there is still concern for aspiration and patient is agreeable, EGD could be considered (inpatient vs. outpatient).    Thank you for your consult.     Shante Kilpatrick PA-C  Gastroenterology  [facility]

## 2020-03-02 NOTE — NURSING
BP was 84/54 - per  1 liter normal saline bolus than start normal saline 0.9% at 125ml/hr and continue to monitor. Post bolus /58.

## 2020-03-02 NOTE — PLAN OF CARE
Problem: Adult Inpatient Plan of Care  Goal: Plan of Care Review  Outcome: Ongoing, Progressing     Problem: Adult Inpatient Plan of Care  Goal: Patient-Specific Goal (Individualization)  Outcome: Ongoing, Progressing     Problem: Adult Inpatient Plan of Care  Goal: Absence of Hospital-Acquired Illness or Injury  Outcome: Ongoing, Progressing

## 2020-03-03 ENCOUNTER — ANESTHESIA (OUTPATIENT)
Dept: ENDOSCOPY | Facility: HOSPITAL | Age: 55
DRG: 871 | End: 2020-03-03
Payer: MEDICARE

## 2020-03-03 ENCOUNTER — ANESTHESIA EVENT (OUTPATIENT)
Dept: ENDOSCOPY | Facility: HOSPITAL | Age: 55
DRG: 871 | End: 2020-03-03
Payer: MEDICARE

## 2020-03-03 PROBLEM — R40.0 SOMNOLENCE: Status: ACTIVE | Noted: 2020-03-03

## 2020-03-03 PROBLEM — R13.10 DYSPHAGIA: Status: ACTIVE | Noted: 2020-03-03

## 2020-03-03 PROBLEM — G92.9 ENCEPHALOPATHY, TOXIC: Status: ACTIVE | Noted: 2020-03-03

## 2020-03-03 LAB
ABO + RH BLD: NORMAL
ALBUMIN SERPL BCP-MCNC: 1.6 G/DL (ref 3.5–5.2)
ALP SERPL-CCNC: 125 U/L (ref 55–135)
ALT SERPL W/O P-5'-P-CCNC: 14 U/L (ref 10–44)
AMPHET+METHAMPHET UR QL: NEGATIVE
ANION GAP SERPL CALC-SCNC: 7 MMOL/L (ref 8–16)
ANION GAP SERPL CALC-SCNC: 7 MMOL/L (ref 8–16)
AST SERPL-CCNC: 18 U/L (ref 10–40)
BARBITURATES UR QL SCN>200 NG/ML: NEGATIVE
BASOPHILS # BLD AUTO: 0.01 K/UL (ref 0–0.2)
BASOPHILS # BLD AUTO: 0.02 K/UL (ref 0–0.2)
BASOPHILS NFR BLD: 0.1 % (ref 0–1.9)
BASOPHILS NFR BLD: 0.1 % (ref 0–1.9)
BENZODIAZ UR QL SCN>200 NG/ML: NEGATIVE
BILIRUB SERPL-MCNC: 0.4 MG/DL (ref 0.1–1)
BLD GP AB SCN CELLS X3 SERPL QL: NORMAL
BLD PROD TYP BPU: NORMAL
BLOOD UNIT EXPIRATION DATE: NORMAL
BLOOD UNIT TYPE CODE: 5100
BLOOD UNIT TYPE: NORMAL
BUN SERPL-MCNC: 12 MG/DL (ref 6–20)
BUN SERPL-MCNC: 15 MG/DL (ref 6–20)
BZE UR QL SCN: NEGATIVE
CALCIUM SERPL-MCNC: 7.6 MG/DL (ref 8.7–10.5)
CALCIUM SERPL-MCNC: 8.2 MG/DL (ref 8.7–10.5)
CANNABINOIDS UR QL SCN: NEGATIVE
CHLORIDE SERPL-SCNC: 94 MMOL/L (ref 95–110)
CHLORIDE SERPL-SCNC: 96 MMOL/L (ref 95–110)
CO2 SERPL-SCNC: 30 MMOL/L (ref 23–29)
CO2 SERPL-SCNC: 30 MMOL/L (ref 23–29)
CODING SYSTEM: NORMAL
CREAT SERPL-MCNC: 1 MG/DL (ref 0.5–1.4)
CREAT SERPL-MCNC: 1.1 MG/DL (ref 0.5–1.4)
CREAT UR-MCNC: 20 MG/DL (ref 23–375)
DIFFERENTIAL METHOD: ABNORMAL
DIFFERENTIAL METHOD: ABNORMAL
DISPENSE STATUS: NORMAL
EOSINOPHIL # BLD AUTO: 0 K/UL (ref 0–0.5)
EOSINOPHIL # BLD AUTO: 0 K/UL (ref 0–0.5)
EOSINOPHIL NFR BLD: 0.2 % (ref 0–8)
EOSINOPHIL NFR BLD: 0.2 % (ref 0–8)
ERYTHROCYTE [DISTWIDTH] IN BLOOD BY AUTOMATED COUNT: 15.2 % (ref 11.5–14.5)
ERYTHROCYTE [DISTWIDTH] IN BLOOD BY AUTOMATED COUNT: 15.2 % (ref 11.5–14.5)
EST. GFR  (AFRICAN AMERICAN): >60 ML/MIN/1.73 M^2
EST. GFR  (AFRICAN AMERICAN): >60 ML/MIN/1.73 M^2
EST. GFR  (NON AFRICAN AMERICAN): >60 ML/MIN/1.73 M^2
EST. GFR  (NON AFRICAN AMERICAN): >60 ML/MIN/1.73 M^2
GLUCOSE SERPL-MCNC: 383 MG/DL (ref 70–110)
GLUCOSE SERPL-MCNC: 67 MG/DL (ref 70–110)
HCT VFR BLD AUTO: 21.3 % (ref 40–54)
HCT VFR BLD AUTO: 27.6 % (ref 40–54)
HGB BLD-MCNC: 6.6 G/DL (ref 14–18)
HGB BLD-MCNC: 8.6 G/DL (ref 14–18)
IMM GRANULOCYTES # BLD AUTO: 0.19 K/UL (ref 0–0.04)
IMM GRANULOCYTES # BLD AUTO: 0.25 K/UL (ref 0–0.04)
IMM GRANULOCYTES NFR BLD AUTO: 1.1 % (ref 0–0.5)
IMM GRANULOCYTES NFR BLD AUTO: 1.4 % (ref 0–0.5)
LYMPHOCYTES # BLD AUTO: 1.4 K/UL (ref 1–4.8)
LYMPHOCYTES # BLD AUTO: 1.6 K/UL (ref 1–4.8)
LYMPHOCYTES NFR BLD: 7.8 % (ref 18–48)
LYMPHOCYTES NFR BLD: 9.5 % (ref 18–48)
MCH RBC QN AUTO: 24.7 PG (ref 27–31)
MCH RBC QN AUTO: 25.5 PG (ref 27–31)
MCHC RBC AUTO-ENTMCNC: 31 G/DL (ref 32–36)
MCHC RBC AUTO-ENTMCNC: 31.2 G/DL (ref 32–36)
MCV RBC AUTO: 80 FL (ref 82–98)
MCV RBC AUTO: 82 FL (ref 82–98)
METHADONE UR QL SCN>300 NG/ML: NEGATIVE
MONOCYTES # BLD AUTO: 0.7 K/UL (ref 0.3–1)
MONOCYTES # BLD AUTO: 0.9 K/UL (ref 0.3–1)
MONOCYTES NFR BLD: 4.3 % (ref 4–15)
MONOCYTES NFR BLD: 5.2 % (ref 4–15)
NEUTROPHILS # BLD AUTO: 14.2 K/UL (ref 1.8–7.7)
NEUTROPHILS # BLD AUTO: 14.9 K/UL (ref 1.8–7.7)
NEUTROPHILS NFR BLD: 83.9 % (ref 38–73)
NEUTROPHILS NFR BLD: 86.2 % (ref 38–73)
NRBC BLD-RTO: 0 /100 WBC
NRBC BLD-RTO: 0 /100 WBC
OPIATES UR QL SCN: NEGATIVE
PCP UR QL SCN>25 NG/ML: NEGATIVE
PHOSPHATE SERPL-MCNC: 2.7 MG/DL (ref 2.7–4.5)
PLATELET # BLD AUTO: 482 K/UL (ref 150–350)
PLATELET # BLD AUTO: 523 K/UL (ref 150–350)
PMV BLD AUTO: 8.9 FL (ref 9.2–12.9)
PMV BLD AUTO: 9.1 FL (ref 9.2–12.9)
POCT GLUCOSE: 100 MG/DL (ref 70–110)
POCT GLUCOSE: 385 MG/DL (ref 70–110)
POCT GLUCOSE: 398 MG/DL (ref 70–110)
POCT GLUCOSE: 79 MG/DL (ref 70–110)
POTASSIUM SERPL-SCNC: 3.9 MMOL/L (ref 3.5–5.1)
POTASSIUM SERPL-SCNC: 3.9 MMOL/L (ref 3.5–5.1)
PROT SERPL-MCNC: 7.4 G/DL (ref 6–8.4)
RBC # BLD AUTO: 2.67 M/UL (ref 4.6–6.2)
RBC # BLD AUTO: 3.37 M/UL (ref 4.6–6.2)
SODIUM SERPL-SCNC: 131 MMOL/L (ref 136–145)
SODIUM SERPL-SCNC: 133 MMOL/L (ref 136–145)
TOXICOLOGY INFORMATION: ABNORMAL
TRANS ERYTHROCYTES VOL PATIENT: NORMAL ML
VANCOMYCIN TROUGH SERPL-MCNC: 12.1 UG/ML (ref 10–22)
WBC # BLD AUTO: 16.87 K/UL (ref 3.9–12.7)
WBC # BLD AUTO: 17.32 K/UL (ref 3.9–12.7)

## 2020-03-03 PROCEDURE — 80048 BASIC METABOLIC PNL TOTAL CA: CPT

## 2020-03-03 PROCEDURE — 63600175 PHARM REV CODE 636 W HCPCS: Performed by: HOSPITALIST

## 2020-03-03 PROCEDURE — 36415 COLL VENOUS BLD VENIPUNCTURE: CPT

## 2020-03-03 PROCEDURE — 80307 DRUG TEST PRSMV CHEM ANLYZR: CPT

## 2020-03-03 PROCEDURE — 25000003 PHARM REV CODE 250: Performed by: HOSPITALIST

## 2020-03-03 PROCEDURE — 87040 BLOOD CULTURE FOR BACTERIA: CPT | Mod: 59

## 2020-03-03 PROCEDURE — 86850 RBC ANTIBODY SCREEN: CPT

## 2020-03-03 PROCEDURE — 25000003 PHARM REV CODE 250: Performed by: INTERNAL MEDICINE

## 2020-03-03 PROCEDURE — 27000221 HC OXYGEN, UP TO 24 HOURS

## 2020-03-03 PROCEDURE — A4216 STERILE WATER/SALINE, 10 ML: HCPCS | Performed by: HOSPITALIST

## 2020-03-03 PROCEDURE — 87040 BLOOD CULTURE FOR BACTERIA: CPT

## 2020-03-03 PROCEDURE — 36430 TRANSFUSION BLD/BLD COMPNT: CPT

## 2020-03-03 PROCEDURE — 11000001 HC ACUTE MED/SURG PRIVATE ROOM

## 2020-03-03 PROCEDURE — 99900035 HC TECH TIME PER 15 MIN (STAT)

## 2020-03-03 PROCEDURE — 94761 N-INVAS EAR/PLS OXIMETRY MLT: CPT

## 2020-03-03 PROCEDURE — 84100 ASSAY OF PHOSPHORUS: CPT

## 2020-03-03 PROCEDURE — 80053 COMPREHEN METABOLIC PANEL: CPT

## 2020-03-03 PROCEDURE — P9021 RED BLOOD CELLS UNIT: HCPCS

## 2020-03-03 PROCEDURE — 86920 COMPATIBILITY TEST SPIN: CPT

## 2020-03-03 PROCEDURE — 87070 CULTURE OTHR SPECIMN AEROBIC: CPT

## 2020-03-03 PROCEDURE — 85025 COMPLETE CBC W/AUTO DIFF WBC: CPT

## 2020-03-03 PROCEDURE — 80202 ASSAY OF VANCOMYCIN: CPT

## 2020-03-03 RX ORDER — ACETAMINOPHEN 500 MG
1000 TABLET ORAL ONCE
Status: COMPLETED | OUTPATIENT
Start: 2020-03-03 | End: 2020-03-03

## 2020-03-03 RX ORDER — TRAMADOL HYDROCHLORIDE 50 MG/1
50 TABLET ORAL EVERY 4 HOURS PRN
Status: DISCONTINUED | OUTPATIENT
Start: 2020-03-03 | End: 2020-03-08 | Stop reason: HOSPADM

## 2020-03-03 RX ORDER — HYDROCODONE BITARTRATE AND ACETAMINOPHEN 500; 5 MG/1; MG/1
TABLET ORAL
Status: DISCONTINUED | OUTPATIENT
Start: 2020-03-03 | End: 2020-03-08 | Stop reason: HOSPADM

## 2020-03-03 RX ORDER — DIPHENHYDRAMINE HYDROCHLORIDE 50 MG/ML
25 INJECTION INTRAMUSCULAR; INTRAVENOUS ONCE
Status: COMPLETED | OUTPATIENT
Start: 2020-03-03 | End: 2020-03-03

## 2020-03-03 RX ADMIN — Medication 10 ML: at 06:03

## 2020-03-03 RX ADMIN — DIPHENHYDRAMINE HYDROCHLORIDE 25 MG: 50 INJECTION INTRAMUSCULAR; INTRAVENOUS at 07:03

## 2020-03-03 RX ADMIN — Medication: at 04:03

## 2020-03-03 RX ADMIN — TRAMADOL HYDROCHLORIDE 50 MG: 50 TABLET, COATED ORAL at 10:03

## 2020-03-03 RX ADMIN — GABAPENTIN 300 MG: 300 CAPSULE ORAL at 03:03

## 2020-03-03 RX ADMIN — ACETAMINOPHEN 650 MG: 325 TABLET ORAL at 01:03

## 2020-03-03 RX ADMIN — IBUPROFEN 400 MG: 400 TABLET, FILM COATED ORAL at 03:03

## 2020-03-03 RX ADMIN — BENZONATATE 100 MG: 100 CAPSULE ORAL at 09:03

## 2020-03-03 RX ADMIN — PIPERACILLIN AND TAZOBACTAM 4.5 G: 4; .5 INJECTION, POWDER, FOR SOLUTION INTRAVENOUS at 10:03

## 2020-03-03 RX ADMIN — INSULIN ASPART 5 UNITS: 100 INJECTION, SOLUTION INTRAVENOUS; SUBCUTANEOUS at 05:03

## 2020-03-03 RX ADMIN — GABAPENTIN 300 MG: 300 CAPSULE ORAL at 09:03

## 2020-03-03 RX ADMIN — VANCOMYCIN HYDROCHLORIDE 1000 MG: 1 INJECTION, POWDER, LYOPHILIZED, FOR SOLUTION INTRAVENOUS at 06:03

## 2020-03-03 RX ADMIN — ACETAMINOPHEN 1000 MG: 500 TABLET ORAL at 07:03

## 2020-03-03 RX ADMIN — INSULIN DETEMIR 10 UNITS: 100 INJECTION, SOLUTION SUBCUTANEOUS at 09:03

## 2020-03-03 RX ADMIN — PANTOPRAZOLE SODIUM 40 MG: 40 TABLET, DELAYED RELEASE ORAL at 09:03

## 2020-03-03 RX ADMIN — DOCUSATE SODIUM - SENNOSIDES 1 TABLET: 50; 8.6 TABLET, FILM COATED ORAL at 09:03

## 2020-03-03 RX ADMIN — HYDROXYZINE PAMOATE 25 MG: 25 CAPSULE ORAL at 03:03

## 2020-03-03 RX ADMIN — HYDROXYZINE PAMOATE 25 MG: 25 CAPSULE ORAL at 05:03

## 2020-03-03 RX ADMIN — MUPIROCIN: 20 OINTMENT TOPICAL at 09:03

## 2020-03-03 RX ADMIN — INSULIN ASPART 10 UNITS: 100 INJECTION, SOLUTION INTRAVENOUS; SUBCUTANEOUS at 12:03

## 2020-03-03 RX ADMIN — ACETAMINOPHEN 650 MG: 325 TABLET ORAL at 06:03

## 2020-03-03 RX ADMIN — Medication 10 ML: at 12:03

## 2020-03-03 RX ADMIN — BENZONATATE 100 MG: 100 CAPSULE ORAL at 03:03

## 2020-03-03 RX ADMIN — HYDROXYZINE PAMOATE 25 MG: 25 CAPSULE ORAL at 10:03

## 2020-03-03 RX ADMIN — ENOXAPARIN SODIUM 40 MG: 100 INJECTION SUBCUTANEOUS at 05:03

## 2020-03-03 RX ADMIN — ATORVASTATIN CALCIUM 40 MG: 40 TABLET, FILM COATED ORAL at 09:03

## 2020-03-03 RX ADMIN — PIPERACILLIN AND TAZOBACTAM 4.5 G: 4; .5 INJECTION, POWDER, FOR SOLUTION INTRAVENOUS at 03:03

## 2020-03-03 RX ADMIN — TRAMADOL HYDROCHLORIDE 50 MG: 50 TABLET, COATED ORAL at 04:03

## 2020-03-03 RX ADMIN — PIPERACILLIN AND TAZOBACTAM 4.5 G: 4; .5 INJECTION, POWDER, FOR SOLUTION INTRAVENOUS at 07:03

## 2020-03-03 RX ADMIN — INSULIN ASPART 6 UNITS: 100 INJECTION, SOLUTION INTRAVENOUS; SUBCUTANEOUS at 05:03

## 2020-03-03 RX ADMIN — INSULIN ASPART 5 UNITS: 100 INJECTION, SOLUTION INTRAVENOUS; SUBCUTANEOUS at 09:03

## 2020-03-03 RX ADMIN — INSULIN ASPART 10 UNITS: 100 INJECTION, SOLUTION INTRAVENOUS; SUBCUTANEOUS at 09:03

## 2020-03-03 RX ADMIN — INSULIN ASPART 5 UNITS: 100 INJECTION, SOLUTION INTRAVENOUS; SUBCUTANEOUS at 12:03

## 2020-03-03 NOTE — ASSESSMENT & PLAN NOTE
Duo to pneumonia,Require more oxygen supplement,chest X ray show possible aspiration,adjusted Abx,chnaged Rocephin to zosyn,added IS,aspiration precaution,consulted ST.on puree diet,oxygenation is improving slowly.    Patient refused GI evaluation,EGD for esophogeal dysmotility.

## 2020-03-03 NOTE — PT/OT/SLP PROGRESS
Physical Therapy      Patient Name:  Cali Mabry   MRN:  [id number]    Patient not seen today secondary to Decreased H&H requiring transfusion. Will follow-up as time allows.    Eri Vasquez, PT

## 2020-03-03 NOTE — SUBJECTIVE & OBJECTIVE
Past Medical History:   Diagnosis Date    Diabetes mellitus     Hepatitis     HTN (hypertension)     Pancreatitis        Past Surgical History:   Procedure Laterality Date    CARDIAC SURGERY  1999    stent placed. ([facility])    SHOULDER SURGERY  2013    right shoulder, spur removal.       Review of patient's allergies indicates:  No Known Allergies    No current facility-administered medications on file prior to encounter.      Current Outpatient Medications on File Prior to Encounter   Medication Sig    atorvastatin (LIPITOR) 40 MG tablet Take 40 mg by mouth once daily.    gabapentin (NEURONTIN) 300 MG capsule Take 300 mg by mouth.    ibuprofen (ADVIL,MOTRIN) 600 MG tablet Take 1 tablet (600 mg total) by mouth every 6 (six) hours as needed for Pain.    insulin aspart U-100 (NOVOLOG) 100 unit/mL (3 mL) InPn pen Inject 10 Units into the skin.    LANTUS 100 unit/mL injection Inject 15 Units into the skin 2 (two) times daily. (Patient taking differently: Inject 30 Units into the skin once daily. )    naloxone (NARCAN) 1 mg/mL injection 2 mg (1 mg per nostril) by Nasal route as needed for opioid overdose; may repeat in 3 to 5 minutes if not effective. Call 911    naloxone (NARCAN) 4 mg/actuation Spry 4mg by nasal route as needed for opioid overdose; may repeat every 2-3 minutes in alternating nostrils until medical help arrives. Call 911    nicotine (NICODERM CQ) 21 mg/24 hr Place 1 patch onto the skin once daily.    omeprazole (PRILOSEC) 40 MG capsule Take 1 capsule (40 mg total) by mouth once daily.     Family History     Problem Relation (Age of Onset)    Asthma Mother        Tobacco Use    Smoking status: Current Every Day Smoker     Packs/day: 1.00     Years: 20.00     Pack years: 20.00     Types: Cigarettes     Start date: 4/3/1981    Smokeless tobacco: Never Used   Substance and Sexual Activity    Alcohol use: No     Alcohol/week: 0.0 standard drinks    Drug use: Yes     Types: IV,  Marijuana, Heroin     Comment: currently, used today, herion    Sexual activity: Yes     Partners: Female     Review of Systems   Constitutional: Positive for activity change and appetite change.   HENT: Negative for congestion and dental problem.    Eyes: Negative for discharge and itching.   Respiratory: Positive for cough. Negative for apnea and chest tightness.    Cardiovascular: Negative for chest pain and leg swelling.   Gastrointestinal: Negative for abdominal pain, nausea and vomiting.   Endocrine: Negative for cold intolerance.   Genitourinary: Negative for difficulty urinating and dysuria.   Musculoskeletal: Negative for arthralgias and back pain.   Skin: Negative for color change and pallor.   Allergic/Immunologic: Negative for environmental allergies and food allergies.   Neurological: Negative for dizziness and facial asymmetry.   Hematological: Does not bruise/bleed easily.   Psychiatric/Behavioral: Negative for agitation.     Objective:     Vital Signs (Most Recent):  Temp: 98.1 °F (36.7 °C) (03/03/20 0722)  Pulse: 93 (03/03/20 0722)  Resp: 18 (03/03/20 0722)  BP: (!) 103/57 (03/03/20 0722)  SpO2: 96 % (03/03/20 0722) Vital Signs (24h Range):  Temp:  [98.1 °F (36.7 °C)-99.7 °F (37.6 °C)] 98.1 °F (36.7 °C)  Pulse:  [] 93  Resp:  [18-24] 18  SpO2:  [92 %-98 %] 96 %  BP: (103-117)/(55-64) 103/57     Weight: 57.4 kg (126 lb 8.7 oz)  Body mass index is 19.24 kg/m².    Physical Exam   Constitutional: He is oriented to person, place, and time. No distress.   HENT:   Head: Atraumatic.   Eyes: Pupils are equal, round, and reactive to light. EOM are normal.   Neck: Normal range of motion. Neck supple.   Cardiovascular: Normal rate and regular rhythm.   Pulmonary/Chest: Effort normal.   Abdominal: Soft. He exhibits no distension.   Musculoskeletal: Normal range of motion. He exhibits no edema or deformity.   Neurological: He is oriented to person, place, and time. No cranial nerve deficit. Coordination  normal.   Skin: Skin is warm and dry.   Psychiatric: He has a normal mood and affect. His behavior is normal.         CRANIAL NERVES     CN III, IV, VI   Pupils are equal, round, and reactive to light.  Extraocular motions are normal.        Significant Labs:   CBC:   Recent Labs   Lab 03/02/20  0508 03/03/20  0546   WBC 19.97* 17.32*   HGB 7.8* 6.6*   HCT 25.4* 21.3*   * 482*     CMP:   Recent Labs   Lab 03/02/20  0508 03/02/20 2056 03/03/20  0546   *  --  131*   K 4.4  --  3.9   CL 99  --  94*   CO2 24  --  30*   * 49* 383*   BUN 12  --  15   CREATININE 0.9  --  1.1   CALCIUM 7.5*  --  7.6*   ANIONGAP 11  --  7*   EGFRNONAA >60  --  >60       Significant Imaging: reviewed.

## 2020-03-03 NOTE — NURSING
Patient states he rolled over in the bed and hit his ribs on the right side. Pain is 10 of 10. Awaiting  call

## 2020-03-03 NOTE — NURSING
Blood glucose 45 - rechecked 35 - 25g D50 IVgiven. Labs drawn - Dr. Wynn notified. Levimer  changed to 10 units. Will continue to monitor patient.

## 2020-03-03 NOTE — PROGRESS NOTES
[facility] Hospital Medicine  Progress Note    Patient Name: Cali Mabry  MRN: [id number]  Patient Class: IP- Inpatient   Admission Date: 2/22/2020  Length of Stay: 10 days  Attending Physician: Kalyani Alberto MD  Primary Care Provider: Primary Doctor No        Subjective:     Principal Problem:Diabetic ketoacidosis without coma associated with diabetes mellitus due to underlying condition        HPI:  54-year-old male past medical history diabetes, hepatitis, hypertension history of polysubstance abuse, pancreatitis presents with abdominal pain for the last 2 days with nausea and vomiting home, and worsening body aches over the last day.  Patient reports he has not had his insulin in some time and reports his glucose is very high.  He states using IV heroin last use few days ago reportedly.  He reports coughing significant amount denies any phlegm production, denies any blood in his sputum, or any chest pain. Denies any blood in her stool, reports normal last bowel movement, reports he is urinating as normal and reports he is extremely thirsty and hungry.  Denies any fevers/chills, no extremity weakness/numbness/tingling.he is in DKA with blood sugar of 635,AG of 26 and CO 2 of 6,he has benen started on DKA protocol,RAMYA with CRT of 1.7,chest  Ray show also sign of pneumonia,patient has been started on broad spectrum IV Abx afterr bload culture is done,    Overview/Hospital Course:  54-year-old male past medical history diabetes, hepatitis, hypertension history of polysubstance abuse,IV heroin,chronic  pancreatitis presents with abdominal pain for the last 2 days with nausea and vomiting home, and worsening body aches over the last day.  Patient reports he has not had his insulin in some time and reports his glucose is very high.  He states using IV heroin last use few days ago reportedly.  He reports coughing significant amount denies any phlegm production, denies any blood in his  sputum, or any chest pain. Denies any blood in her stool, reports normal last bowel movement, reports he is urinating as normal and reports he is extremely thirsty and hungry.  Denies any fevers/chills, no extremity weakness/numbness/tingling.he is in DKA with blood sugar of 635,AG of 26 and CO 2 of 6,he has been  started on DKA protocol,RAMYA with CRT of 1.7,chest  ray show also sign of pneumonia,patient has been started on broad spectrum IV Abx afterr bload culture is done,  AG is closed,started on SQ insulin,started on diet.  Has bacteremia,staph aureus,likely from IV heroin use,alreday on Vanc.repeated blood culture,Echo to R/Oed out  endocarditis.  On Zosyn and azithromycin for pneumonia.  Repeat blood culture no growth,consulted SW for LTAC placement,he is IV drug user,can not go home.reperat blood culture show growth,will place picc line.  Afrberile,no place in LTAC yet.  Podiatry cleaned right heel callus,  Afebrile.  Require more oxygen supplement,chest X ray show possible aspiration,adjusted Abx,changed Rocephin to zosyn,added IS,aspiration precaution,consulted ST.on puree diet,oxygenation is improved,but he is septic with fever,leucocytiosis,tachycardia,tachypnea,likely duo to pneumonia.repeated blood culture.no growth,repeat chest x ray show some improvement.  Consulted PT,OT.  HH is drop today with no sign of bleeding,will transfuse pack of RBC.  Patient refused GI evaluation,EGD for esophogeal dysmotility.    Past Medical History:   Diagnosis Date    Diabetes mellitus     Hepatitis     HTN (hypertension)     Pancreatitis        Past Surgical History:   Procedure Laterality Date    CARDIAC SURGERY  1999    stent placed. ([facility])    SHOULDER SURGERY  2013    right shoulder, spur removal.       Review of patient's allergies indicates:  No Known Allergies    No current facility-administered medications on file prior to encounter.      Current Outpatient Medications on File Prior to Encounter    Medication Sig    atorvastatin (LIPITOR) 40 MG tablet Take 40 mg by mouth once daily.    gabapentin (NEURONTIN) 300 MG capsule Take 300 mg by mouth.    ibuprofen (ADVIL,MOTRIN) 600 MG tablet Take 1 tablet (600 mg total) by mouth every 6 (six) hours as needed for Pain.    insulin aspart U-100 (NOVOLOG) 100 unit/mL (3 mL) InPn pen Inject 10 Units into the skin.    LANTUS 100 unit/mL injection Inject 15 Units into the skin 2 (two) times daily. (Patient taking differently: Inject 30 Units into the skin once daily. )    naloxone (NARCAN) 1 mg/mL injection 2 mg (1 mg per nostril) by Nasal route as needed for opioid overdose; may repeat in 3 to 5 minutes if not effective. Call 911    naloxone (NARCAN) 4 mg/actuation Spry 4mg by nasal route as needed for opioid overdose; may repeat every 2-3 minutes in alternating nostrils until medical help arrives. Call 911    nicotine (NICODERM CQ) 21 mg/24 hr Place 1 patch onto the skin once daily.    omeprazole (PRILOSEC) 40 MG capsule Take 1 capsule (40 mg total) by mouth once daily.     Family History     Problem Relation (Age of Onset)    Asthma Mother        Tobacco Use    Smoking status: Current Every Day Smoker     Packs/day: 1.00     Years: 20.00     Pack years: 20.00     Types: Cigarettes     Start date: 4/3/1981    Smokeless tobacco: Never Used   Substance and Sexual Activity    Alcohol use: No     Alcohol/week: 0.0 standard drinks    Drug use: Yes     Types: IV, Marijuana, Heroin     Comment: currently, used today, herion    Sexual activity: Yes     Partners: Female     Review of Systems   Constitutional: Positive for activity change and appetite change.   HENT: Negative for congestion and dental problem.    Eyes: Negative for discharge and itching.   Respiratory: Positive for cough. Negative for apnea and chest tightness.    Cardiovascular: Negative for chest pain and leg swelling.   Gastrointestinal: Negative for abdominal pain, nausea and vomiting.    Endocrine: Negative for cold intolerance.   Genitourinary: Negative for difficulty urinating and dysuria.   Musculoskeletal: Negative for arthralgias and back pain.   Skin: Negative for color change and pallor.   Allergic/Immunologic: Negative for environmental allergies and food allergies.   Neurological: Negative for dizziness and facial asymmetry.   Hematological: Does not bruise/bleed easily.   Psychiatric/Behavioral: Negative for agitation.     Objective:     Vital Signs (Most Recent):  Temp: 98.1 °F (36.7 °C) (03/03/20 0722)  Pulse: 93 (03/03/20 0722)  Resp: 18 (03/03/20 0722)  BP: (!) 103/57 (03/03/20 0722)  SpO2: 96 % (03/03/20 0722) Vital Signs (24h Range):  Temp:  [98.1 °F (36.7 °C)-99.7 °F (37.6 °C)] 98.1 °F (36.7 °C)  Pulse:  [] 93  Resp:  [18-24] 18  SpO2:  [92 %-98 %] 96 %  BP: (103-117)/(55-64) 103/57     Weight: 57.4 kg (126 lb 8.7 oz)  Body mass index is 19.24 kg/m².    Physical Exam   Constitutional: He is oriented to person, place, and time. No distress.   HENT:   Head: Atraumatic.   Eyes: Pupils are equal, round, and reactive to light. EOM are normal.   Neck: Normal range of motion. Neck supple.   Cardiovascular: Normal rate and regular rhythm.   Pulmonary/Chest: Effort normal.   Abdominal: Soft. He exhibits no distension.   Musculoskeletal: Normal range of motion. He exhibits no edema or deformity.   Neurological: He is oriented to person, place, and time. No cranial nerve deficit. Coordination normal.   Skin: Skin is warm and dry.   Psychiatric: He has a normal mood and affect. His behavior is normal.         CRANIAL NERVES     CN III, IV, VI   Pupils are equal, round, and reactive to light.  Extraocular motions are normal.        Significant Labs:   CBC:   Recent Labs   Lab 03/02/20  0508 03/03/20  0546   WBC 19.97* 17.32*   HGB 7.8* 6.6*   HCT 25.4* 21.3*   * 482*     CMP:   Recent Labs   Lab 03/02/20  0508 03/02/20 2056 03/03/20  0546   *  --  131*   K 4.4  --  3.9   CL  99  --  94*   CO2 24  --  30*   * 49* 383*   BUN 12  --  15   CREATININE 0.9  --  1.1   CALCIUM 7.5*  --  7.6*   ANIONGAP 11  --  7*   EGFRNONAA >60  --  >60       Significant Imaging: reviewed.      Assessment/Plan:      * Diabetic ketoacidosis without coma associated with diabetes mellitus due to underlying condition  DKA resolved  Now with hypoglycemia  Has received D50, patient eating well  If hypoglycemia persist, will start D51/2NS      Dysphagia    Patient refused GI evaluation,EGD for esophogeal dysmotility.      Sepsis   he is septic with fever,leucocytiosis,tachycardia,tachypnea,likely duo to pneumonia.repeated blood culture.on IV Abx,fever is improved,no growth in repeat blood culture.      Acute respiratory failure with hypoxia  Duo to pneumonia,Require more oxygen supplement,chest X ray show possible aspiration,adjusted Abx,chnaged Rocephin to zosyn,added IS,aspiration precaution,consulted ST.on puree diet,oxygenation is improving slowly.    Patient refused GI evaluation,EGD for esophogeal dysmotility.    MRSA bacteremia  Blood cultures (+) MRSA  Likely from IV heroin use  Continue Vancomycin  Blood cultures repeated  Echo performed: WNL  Repeat blood culture no growth,consulted SW for LTAC placement,he is IV drug user,can not go home.reperat blood culture show growth, placed picc line.    Afrberile,no place in LTAC yet.  Afebrile.        Pneumonia due to infectious organism  Per clinical presentation and imaging,will continue with IV Abx.no growth,repeat chest x ray show some improvement.      Chronic hepatitis C  Will monitor.      Anemia of chronic disease    HH is drop today with no sign of bleeding,will transfuse pack of RBC.      Intravenous drug abuse  Consult on IV heroin cessation use  Patient states that he quit 3-4 weeks ago      Hyperlipidemia  On statin.      Pancreatitis, chronic  Stable at this  time.      Essential hypertension  Stable at this time with no meds  Will monitor  throughout hospital course    Type 2 diabetes mellitus, uncontrolled  Currently hypoglycemia  Will monitor blood glucose q.a.c. and HS  Will adjust regimen based on blood glucose trends        VTE Risk Mitigation (From admission, onward)         Ordered     enoxaparin injection 40 mg  Daily      02/22/20 1157     IP VTE HIGH RISK PATIENT  Once      02/22/20 1157     Place DAWIT hose  Until discontinued      02/22/20 1157                      Kalyani Alberto MD  Department of Hospital Medicine   [facility]

## 2020-03-03 NOTE — PROGRESS NOTES
Pharmacokinetic Assessment Follow Up: IV Vancomycin    Vancomycin serum concentration assessment(s):    The trough level was drawn correctly and can be used to guide therapy at this time. The measurement is within the desired definitive target range of 10 to 20 mcg/mL.    Vancomycin Regimen Plan:    Continue regimen to Vancomycin 1000 mg IV every 12 hours with next serum trough concentration measured at 0530 on 3/5/2020     Drug levels (last 3 results):  Recent Labs   Lab Result Units 03/01/20  1534 03/03/20  0547   Vancomycin-Trough ug/mL 12.5 12.1       Pharmacy will continue to follow and monitor vancomycin.    Please contact pharmacy at extension [phone] for questions regarding this assessment.    Thank you for the consult,   Todd Leal Jr       Patient brief summary:  Cali Mabry is a 54 y.o. male initiated on antimicrobial therapy with IV Vancomycin for treatment of bacteremia    Drug Allergies:   Review of patient's allergies indicates:  No Known Allergies    Actual Body Weight:   57.4 kg    Renal Function:   Estimated Creatinine Clearance: 63.1 mL/min (based on SCr of 1.1 mg/dL).,     Dialysis Method (if applicable):  N/A    CBC (last 72 hours):  Recent Labs   Lab Result Units 03/01/20  0353 03/02/20  0508 03/03/20  0546   WBC K/uL 16.67* 19.97* 17.32*   Hemoglobin g/dL 8.6* 7.8* 6.6*   Hematocrit % 26.6* 25.4* 21.3*   Platelets K/uL 427* 456* 482*   Gran% % 80.7* 85.7* 86.2*   Lymph% % 10.0* 7.4* 7.8*   Mono% % 3.8* 3.5* 4.3   Eosinophil% % 0.7 0.4 0.2   Basophil% % 0.2 0.2 0.1   Differential Method  Automated Automated Automated       Metabolic Panel (last 72 hours):  Recent Labs   Lab Result Units 03/01/20  0353 03/02/20  0508 03/02/20 2056 03/03/20  0546   Sodium mmol/L 138 134*  --  131*   Potassium mmol/L 3.2* 4.4  --  3.9   Chloride mmol/L 99 99  --  94*   CO2 mmol/L 29 24  --  30*   Glucose mg/dL 56* 304* 49* 383*   BUN, Bld mg/dL 10 12  --  15   Creatinine mg/dL 0.9 0.9  --  1.1    Phosphorus mg/dL 3.2 2.8  --  2.7       Vancomycin Administrations:  vancomycin given in the last 96 hours                     vancomycin in dextrose 5 % 1 gram/250 mL IVPB 1,000 mg (mg) 1,000 mg New Bag 03/03/20 0630     1,000 mg New Bag 03/02/20 1835     1,000 mg New Bag  0547     1,000 mg New Bag 03/01/20 1759     1,000 mg New Bag  0645     1,000 mg New Bag 02/29/20 1710     1,000 mg New Bag  0555     1,000 mg New Bag 02/28/20 1718                      Microbiologic Results:  Microbiology Results (last 7 days)       Procedure Component Value Units Date/Time    Blood culture [[id number]] Collected:  03/01/20 0842    Order Status:  Completed Specimen:  Blood Updated:  03/02/20 1103     Blood Culture, Routine No Growth to date      No Growth to date    Blood culture [[id number]] Collected:  03/01/20 0835    Order Status:  Completed Specimen:  Blood Updated:  03/02/20 1103     Blood Culture, Routine No Growth to date      No Growth to date    Blood culture [[id number]] Collected:  02/23/20 1139    Order Status:  Completed Specimen:  Blood Updated:  02/28/20 1303     Blood Culture, Routine No growth after 5 days.    Blood culture [[id number]] Collected:  02/23/20 1140    Order Status:  Completed Specimen:  Blood Updated:  02/28/20 1303     Blood Culture, Routine No growth after 5 days.    Blood Culture #1 **CANNOT BE ORDERED STAT** [[id number]] Collected:  02/22/20 0540    Order Status:  Completed Specimen:  Blood from Line, Jugular, External Right Updated:  02/27/20 0703     Blood Culture, Routine No growth after 5 days.    Culture, Respiratory with Gram Stain [[id number]]  (Abnormal)  (Susceptibility) Collected:  02/23/20 0200    Order Status:  Completed Specimen:  Respiratory from Sputum, Expectorated Updated:  02/25/20 1011     Respiratory Culture ESCHERICHIA COLI  Many        METHICILLIN RESISTANT STAPHYLOCOCCUS AUREUS  Many       Gram Stain (Respiratory) <10 epithelial cells per low power field.     Gram Stain  (Respiratory) Moderate WBC's     Gram Stain (Respiratory) Moderate Gram positive cocci in clusters     Gram Stain (Respiratory) Few Gram positive rods

## 2020-03-03 NOTE — PROGRESS NOTES
Notified that patient agreeable to EGD this AM.  Unable to perform as patient needed transfusion prior to anesthesia.  Saw patient on the floor after delay where again he refused work up.  Will follow peripherally at this time.  Let us know if additional needs arise.

## 2020-03-03 NOTE — PT/OT/SLP PROGRESS
Occupational Therapy      Patient Name:  Cali Mabry   MRN:  [id number]    Patient not seen today secondary to patient scheduled to receive blood. Will follow-up as able.    HUMBERTO Cornejo  3/3/2020

## 2020-03-03 NOTE — PLAN OF CARE
Problem: Adult Inpatient Plan of Care  Goal: Plan of Care Review  Outcome: Ongoing, Progressing     Problem: Adult Inpatient Plan of Care  Goal: Absence of Hospital-Acquired Illness or Injury  Outcome: Ongoing, Progressing     Problem: Adult Inpatient Plan of Care  Goal: Readiness for Transition of Care  Outcome: Ongoing, Progressing     Problem: Diabetes Comorbidity  Goal: Blood Glucose Level Within Desired Range  Outcome: Ongoing, Progressing     Problem: Diabetic Ketoacidosis  Goal: Fluid and Electrolyte Balance with Absence of Ketosis  Outcome: Ongoing, Progressing

## 2020-03-03 NOTE — ANESTHESIA PREPROCEDURE EVALUATION
03/03/2020  Cali Mabry is a 54 y.o., male.  Pre-operative evaluation for Procedure(s) (LRB):  EGD (ESOPHAGOGASTRODUODENOSCOPY) (N/A)      Denies CP/SOB/GERD/MI/CVA.  Denies dysphagia of any kind  NPO >8h  METS > 4    Admitted 2/22 with Pancreatitis, MRSA bacteremia secondary to IVDU, RAMYA, in DKA w/ sepsis with septic pulm emboli on CT chest 3/4 for EGD to eval GI bleed. Also noted to have bilateral LL infiltrates on antibiotics (zosyn) for  PNA & sepsis. PIcc Line removed and sent for culture 3/4.  Patient refused procedure initially now wishes to proceed per GI notes. S/p 1 U PRBCs w/ Hgb improved to 9.    PT on 2L NC.  Vitals:    03/04/20 0755 03/04/20 1241 03/04/20 1440 03/04/20 1619   BP: (!) 122/58 (!) 105/57 (!) 107/56 (!) 117/58   BP Location: Left arm Left arm Right arm Left arm   Patient Position: Lying Sitting Lying Lying   Pulse: (!) 116 100 101 99   Resp: 17 17 18 18   Temp: (!) 39.3 °C (102.8 °F) 37.5 °C (99.5 °F) 37.2 °C (99 °F) 37.1 °C (98.8 °F)   TempSrc: Oral Oral Oral Oral   SpO2: (!) 91% (!) 94% (!) 92% (!) 92%   Weight:       Height:         Patient Active Problem List   Diagnosis    Opioid dependence, continuous    Intractable abdominal pain    Type 2 diabetes mellitus, uncontrolled    Essential hypertension    Acute on chronic pancreatitis    Pancreatitis, chronic    Normocytic normochromic anemia    Hyperlipidemia    Intravenous drug abuse    Anemia of chronic disease    Chronic hepatitis C    Chronic relapsing pancreatitis    Tobacco abuse    Fever, unknown origin    Cocaine abuse    Marijuana abuse    Heroin abuse    IV drug user    Elevated troponin    Pulmonary nodule    Diabetic ketoacidosis without coma associated with diabetes mellitus due to underlying condition    Pneumonia due to infectious organism    MRSA bacteremia    Acute respiratory  failure with hypoxia    Sepsis    Dysphagia    Somnolence    Encephalopathy, toxic       Review of patient's allergies indicates:  No Known Allergies    No current facility-administered medications on file prior to encounter.      Current Outpatient Medications on File Prior to Encounter   Medication Sig Dispense Refill    atorvastatin (LIPITOR) 40 MG tablet Take 40 mg by mouth once daily.      gabapentin (NEURONTIN) 300 MG capsule Take 300 mg by mouth.      ibuprofen (ADVIL,MOTRIN) 600 MG tablet Take 1 tablet (600 mg total) by mouth every 6 (six) hours as needed for Pain. 20 tablet 0    insulin aspart U-100 (NOVOLOG) 100 unit/mL (3 mL) InPn pen Inject 10 Units into the skin.      LANTUS 100 unit/mL injection Inject 15 Units into the skin 2 (two) times daily. (Patient taking differently: Inject 30 Units into the skin once daily. ) 9 mL 0    naloxone (NARCAN) 1 mg/mL injection 2 mg (1 mg per nostril) by Nasal route as needed for opioid overdose; may repeat in 3 to 5 minutes if not effective. Call 911 2 mL 11    naloxone (NARCAN) 4 mg/actuation Spry 4mg by nasal route as needed for opioid overdose; may repeat every 2-3 minutes in alternating nostrils until medical help arrives. Call 911 1 each 11    nicotine (NICODERM CQ) 21 mg/24 hr Place 1 patch onto the skin once daily.  0    omeprazole (PRILOSEC) 40 MG capsule Take 1 capsule (40 mg total) by mouth once daily. 30 capsule 11       Past Surgical History:   Procedure Laterality Date    CARDIAC SURGERY  1999    stent placed. ([facility])    SHOULDER SURGERY  2013    right shoulder, spur removal.       Social History     Socioeconomic History    Marital status: Single     Spouse name: Not on file    Number of children: Not on file    Years of education: Not on file    Highest education level: Not on file   Occupational History    Not on file   Social Needs    Financial resource strain: Not on file    Food insecurity:     Worry: Not on file      Inability: Not on file    Transportation needs:     Medical: Not on file     Non-medical: Not on file   Tobacco Use    Smoking status: Current Every Day Smoker     Packs/day: 1.00     Years: 20.00     Pack years: 20.00     Types: Cigarettes     Start date: 4/3/1981    Smokeless tobacco: Never Used   Substance and Sexual Activity    Alcohol use: No     Alcohol/week: 0.0 standard drinks    Drug use: Yes     Types: IV, Marijuana, Heroin     Comment: currently, used today, herion    Sexual activity: Yes     Partners: Female   Lifestyle    Physical activity:     Days per week: Not on file     Minutes per session: Not on file    Stress: Not on file   Relationships    Social connections:     Talks on phone: Not on file     Gets together: Not on file     Attends [employer] service: Not on file     Active member of club or organization: Not on file     Attends meetings of clubs or organizations: Not on file     Relationship status: Not on file   Other Topics Concern    Not on file   Social History Narrative    Not on file         CBC:   Recent Labs     03/03/20 1931 03/04/20  0726   WBC 16.87* 15.02*   RBC 3.37* 3.57*   HGB 8.6* 9.0*   HCT 27.6* 28.3*   * 522*   MCV 82 79*   MCH 25.5* 25.2*   MCHC 31.2* 31.8*       CMP:   Recent Labs     03/03/20  0546 03/03/20 1931 03/04/20  0726   * 133* 131*   K 3.9 3.9 3.9   CL 94* 96 95   CO2 30* 30* 28   BUN 15 12 8   CREATININE 1.1 1.0 0.9   * 67* 205*   PHOS 2.7  --  2.0*   CALCIUM 7.6* 8.2* 8.0*   ALBUMIN  --  1.6*  --    PROT  --  7.4  --    ALKPHOS  --  125  --    ALT  --  14  --    AST  --  18  --    BILITOT  --  0.4  --        INR  No results for input(s): PT, INR, PROTIME, APTT in the last 72 hours.    Blood cx 3/1-3/3 NGTD    Diagnostic Studies:  CT chest 3/4/20:  1.   Innumerable cavitary lung lesions throughout both lungs,  many which are associated with air-fluid levels.  The largest abnormal area is in the right lower lobe and is associated  with surrounding consolidation involving almost the entire lobe as well as mild right pleural fluid.  In light of the widespread nature of the findings and patient history of IV drug abuse, the findings are suspicious for septic pulmonary emboli.  Mycobacterial or fungal infection could have a similar appearance.  Less likely considerations include metastatic disease, rheumatoid nodules, and polyangiitis.    2.  Centrilobular emphysema    3.  Atherosclerosis    CT head 3/3/20  1. No acute intracranial abnormalities, no findings to suggest acute major vascular territory infarct or hemorrhage.  2. Prominent ventricular system, may be greater than expected for generalized cerebral volume loss.  Normal pressure hydrocephalus is a consideration.  3. Stable remote infarct involving the right maykel ludwin.  4. Stable sequela of generalized cerebral volume loss and senescent change.    CXR 3/3/20:  FINDINGS:  Right PICC catheter tip projects over the distal SVC.  There is patchy increased interstitial and parenchymal attenuation bilaterally, primarily involving the bilateral lower lung zones.  This appears grossly stable as compared to the previous exam allowing for shallow inspiratory effort on current exam.  No pneumothorax or significant interval detrimental change in the cardiopulmonary status since previous exam.    CV Ankle brachial index US 20  1. Right lower extremity pressures and waveforms indicate severe arterial occlusive disease.  2. Left lower extremity pressures and waveforms indicate mild arterial occlusive disease.    EK20  Vent. Rate : 117 BPM     Atrial Rate : 117 BPM     P-R Int : 150 ms          QRS Dur : 094 ms      QT Int : 344 ms       P-R-T Axes : 070 071 058 degrees     QTc Int : 479 ms    Sinus tachycardia  Otherwise normal ECG  When compared with ECG of 2020 12:30,  Significant changes have occurred  Confirmed by Hans Szymanski MD ([id number]) on 3/1/2020 12:47:47 PM    2D  Echo:    2/24/20  · Normal left ventricular systolic function. The estimated ejection fraction is 60%.  · Normal LV diastolic function.  · Normal right ventricular systolic function.  · Very TDS    Stress test 3/15/19  · The perfusion scan is free of evidence from myocardial ischemia or injury.  · There is a mild intensity fixed defect in the inferior wall of the left ventricle secondary to diaphragm attenuation.  · An ejection fraction of 53 % at rest  · The EKG portion of this study is negative for myocardial ischemia.    Results for orders placed or performed during the hospital encounter of 11/22/17   2D echo with color flow doppler   Result Value Ref Range    QEF 55 55 - 65    Mitral Valve Regurgitation TRIVIAL     Diastolic Dysfunction No     Est. PA Systolic Pressure 28.81     Tricuspid Valve Regurgitation TRIVIAL          Anesthesia Evaluation    I have reviewed the Patient Summary Reports.     I have reviewed the Medications.     Review of Systems  Anesthesia Hx:  No problems with previous Anesthesia  History of prior surgery of interest to airway management or planning: Denies Family Hx of Anesthesia complications.   Denies Personal Hx of Anesthesia complications.   Social:  Alcohol Use, Smoker Iv drug use heroin, cocaine, THC currently   Hematology/Oncology:     Oncology Normal    -- Anemia: Hematology Comments: S/p 1 u PRBCs 3/3; MRSA bacteremia with repeat blood cx NGTD     EENT/Dental:EENT/Dental Normal   Cardiovascular:   Exercise tolerance: poor Hypertension, poorly controlled CABG/stent (1999) PVD hyperlipidemia ECG has been reviewed.    Pulmonary:   Pneumonia (Bilateral septic emboli in lungs on CT chest)    Renal/:   Chronic Renal Disease, ARF    Hepatic/GI:   Liver Disease, Hepatitis, C Pancreatitis   Musculoskeletal:  Musculoskeletal Normal    Neurological:  Neurology Normal    Endocrine:   Diabetes, poorly controlled, type 2, using insulin Admitted w/ DKA   Dermatological:  Skin Normal     Psych:  Psychiatric Normal           Physical Exam  General:  Cachexia    Airway/Jaw/Neck:   MP2, TMD > 3FB, edentulous     Chest/Lungs:  Chest/Lungs Clear    Heart/Vascular:  Heart Findings: Normal            Anesthesia Plan  Type of Anesthesia, risks & benefits discussed:  Anesthesia Type:  general, MAC  Patient's Preference:   Intra-op Monitoring Plan: standard ASA monitors  Intra-op Monitoring Plan Comments:   Post Op Pain Control Plan:   Post Op Pain Control Plan Comments:   Induction:   IV  Beta Blocker:  Patient is not currently on a Beta-Blocker (No further documentation required).       Informed Consent: Patient understands risks and agrees with Anesthesia plan.  Questions answered. Anesthesia consent signed with patient.  ASA Score: 3     Day of Surgery Review of History & Physical:  There are no significant changes.          Ready For Surgery From Anesthesia Perspective.

## 2020-03-04 LAB
ANION GAP SERPL CALC-SCNC: 8 MMOL/L (ref 8–16)
BASOPHILS # BLD AUTO: 0.01 K/UL (ref 0–0.2)
BASOPHILS NFR BLD: 0.1 % (ref 0–1.9)
BUN SERPL-MCNC: 8 MG/DL (ref 6–20)
CALCIUM SERPL-MCNC: 8 MG/DL (ref 8.7–10.5)
CHLORIDE SERPL-SCNC: 95 MMOL/L (ref 95–110)
CO2 SERPL-SCNC: 28 MMOL/L (ref 23–29)
CREAT SERPL-MCNC: 0.9 MG/DL (ref 0.5–1.4)
DIFFERENTIAL METHOD: ABNORMAL
EOSINOPHIL # BLD AUTO: 0 K/UL (ref 0–0.5)
EOSINOPHIL NFR BLD: 0.1 % (ref 0–8)
ERYTHROCYTE [DISTWIDTH] IN BLOOD BY AUTOMATED COUNT: 15 % (ref 11.5–14.5)
EST. GFR  (AFRICAN AMERICAN): >60 ML/MIN/1.73 M^2
EST. GFR  (NON AFRICAN AMERICAN): >60 ML/MIN/1.73 M^2
GLUCOSE SERPL-MCNC: 205 MG/DL (ref 70–110)
HCT VFR BLD AUTO: 28.3 % (ref 40–54)
HGB BLD-MCNC: 9 G/DL (ref 14–18)
IMM GRANULOCYTES # BLD AUTO: 0.14 K/UL (ref 0–0.04)
IMM GRANULOCYTES NFR BLD AUTO: 0.9 % (ref 0–0.5)
LYMPHOCYTES # BLD AUTO: 1.3 K/UL (ref 1–4.8)
LYMPHOCYTES NFR BLD: 8.3 % (ref 18–48)
MCH RBC QN AUTO: 25.2 PG (ref 27–31)
MCHC RBC AUTO-ENTMCNC: 31.8 G/DL (ref 32–36)
MCV RBC AUTO: 79 FL (ref 82–98)
MONOCYTES # BLD AUTO: 0.9 K/UL (ref 0.3–1)
MONOCYTES NFR BLD: 6.1 % (ref 4–15)
NEUTROPHILS # BLD AUTO: 12.7 K/UL (ref 1.8–7.7)
NEUTROPHILS NFR BLD: 84.5 % (ref 38–73)
NRBC BLD-RTO: 0 /100 WBC
PHOSPHATE SERPL-MCNC: 2 MG/DL (ref 2.7–4.5)
PLATELET # BLD AUTO: 522 K/UL (ref 150–350)
PMV BLD AUTO: 8.8 FL (ref 9.2–12.9)
POCT GLUCOSE: 140 MG/DL (ref 70–110)
POCT GLUCOSE: 165 MG/DL (ref 70–110)
POCT GLUCOSE: 204 MG/DL (ref 70–110)
POCT GLUCOSE: 224 MG/DL (ref 70–110)
POCT GLUCOSE: 232 MG/DL (ref 70–110)
POTASSIUM SERPL-SCNC: 3.9 MMOL/L (ref 3.5–5.1)
RBC # BLD AUTO: 3.57 M/UL (ref 4.6–6.2)
SODIUM SERPL-SCNC: 131 MMOL/L (ref 136–145)
WBC # BLD AUTO: 15.02 K/UL (ref 3.9–12.7)

## 2020-03-04 PROCEDURE — C9399 UNCLASSIFIED DRUGS OR BIOLOG: HCPCS | Performed by: HOSPITALIST

## 2020-03-04 PROCEDURE — 99222 PR INITIAL HOSPITAL CARE,LEVL II: ICD-10-PCS | Mod: ,,, | Performed by: PSYCHIATRY & NEUROLOGY

## 2020-03-04 PROCEDURE — 92526 ORAL FUNCTION THERAPY: CPT

## 2020-03-04 PROCEDURE — 84100 ASSAY OF PHOSPHORUS: CPT

## 2020-03-04 PROCEDURE — 80048 BASIC METABOLIC PNL TOTAL CA: CPT

## 2020-03-04 PROCEDURE — 11000001 HC ACUTE MED/SURG PRIVATE ROOM

## 2020-03-04 PROCEDURE — 99222 1ST HOSP IP/OBS MODERATE 55: CPT | Mod: ,,, | Performed by: PSYCHIATRY & NEUROLOGY

## 2020-03-04 PROCEDURE — 25000003 PHARM REV CODE 250: Performed by: HOSPITALIST

## 2020-03-04 PROCEDURE — 36415 COLL VENOUS BLD VENIPUNCTURE: CPT

## 2020-03-04 PROCEDURE — 63600175 PHARM REV CODE 636 W HCPCS: Performed by: HOSPITALIST

## 2020-03-04 PROCEDURE — 85025 COMPLETE CBC W/AUTO DIFF WBC: CPT

## 2020-03-04 RX ADMIN — INSULIN DETEMIR 10 UNITS: 100 INJECTION, SOLUTION SUBCUTANEOUS at 08:03

## 2020-03-04 RX ADMIN — TRAMADOL HYDROCHLORIDE 50 MG: 50 TABLET, COATED ORAL at 08:03

## 2020-03-04 RX ADMIN — ATORVASTATIN CALCIUM 40 MG: 40 TABLET, FILM COATED ORAL at 09:03

## 2020-03-04 RX ADMIN — GABAPENTIN 300 MG: 300 CAPSULE ORAL at 08:03

## 2020-03-04 RX ADMIN — INSULIN ASPART 5 UNITS: 100 INJECTION, SOLUTION INTRAVENOUS; SUBCUTANEOUS at 08:03

## 2020-03-04 RX ADMIN — ACETAMINOPHEN 650 MG: 325 TABLET ORAL at 07:03

## 2020-03-04 RX ADMIN — VANCOMYCIN HYDROCHLORIDE 1000 MG: 1 INJECTION, POWDER, LYOPHILIZED, FOR SOLUTION INTRAVENOUS at 06:03

## 2020-03-04 RX ADMIN — FUROSEMIDE 40 MG: 40 TABLET ORAL at 09:03

## 2020-03-04 RX ADMIN — TRAMADOL HYDROCHLORIDE 50 MG: 50 TABLET, COATED ORAL at 02:03

## 2020-03-04 RX ADMIN — TRAMADOL HYDROCHLORIDE 50 MG: 50 TABLET, COATED ORAL at 03:03

## 2020-03-04 RX ADMIN — ACETAMINOPHEN 650 MG: 325 TABLET ORAL at 09:03

## 2020-03-04 RX ADMIN — INSULIN DETEMIR 10 UNITS: 100 INJECTION, SOLUTION SUBCUTANEOUS at 10:03

## 2020-03-04 RX ADMIN — HYDROXYZINE PAMOATE 25 MG: 25 CAPSULE ORAL at 08:03

## 2020-03-04 RX ADMIN — PIPERACILLIN AND TAZOBACTAM 4.5 G: 4; .5 INJECTION, POWDER, FOR SOLUTION INTRAVENOUS at 02:03

## 2020-03-04 RX ADMIN — POTASSIUM PHOSPHATE, MONOBASIC 500 MG: 500 TABLET, SOLUBLE ORAL at 10:03

## 2020-03-04 RX ADMIN — VANCOMYCIN HYDROCHLORIDE 1000 MG: 1 INJECTION, POWDER, LYOPHILIZED, FOR SOLUTION INTRAVENOUS at 05:03

## 2020-03-04 RX ADMIN — INSULIN ASPART 2 UNITS: 100 INJECTION, SOLUTION INTRAVENOUS; SUBCUTANEOUS at 05:03

## 2020-03-04 RX ADMIN — ENOXAPARIN SODIUM 40 MG: 100 INJECTION SUBCUTANEOUS at 05:03

## 2020-03-04 RX ADMIN — HYDROXYZINE PAMOATE 25 MG: 25 CAPSULE ORAL at 06:03

## 2020-03-04 RX ADMIN — BENZONATATE 100 MG: 100 CAPSULE ORAL at 03:03

## 2020-03-04 RX ADMIN — HYDROXYZINE PAMOATE 25 MG: 25 CAPSULE ORAL at 03:03

## 2020-03-04 RX ADMIN — INSULIN ASPART 4 UNITS: 100 INJECTION, SOLUTION INTRAVENOUS; SUBCUTANEOUS at 01:03

## 2020-03-04 RX ADMIN — GABAPENTIN 300 MG: 300 CAPSULE ORAL at 05:03

## 2020-03-04 RX ADMIN — BENZONATATE 100 MG: 100 CAPSULE ORAL at 09:03

## 2020-03-04 RX ADMIN — POTASSIUM PHOSPHATE, MONOBASIC 500 MG: 500 TABLET, SOLUBLE ORAL at 08:03

## 2020-03-04 RX ADMIN — PANTOPRAZOLE SODIUM 40 MG: 40 TABLET, DELAYED RELEASE ORAL at 09:03

## 2020-03-04 RX ADMIN — INSULIN ASPART 5 UNITS: 100 INJECTION, SOLUTION INTRAVENOUS; SUBCUTANEOUS at 05:03

## 2020-03-04 RX ADMIN — BENZONATATE 100 MG: 100 CAPSULE ORAL at 08:03

## 2020-03-04 RX ADMIN — PIPERACILLIN AND TAZOBACTAM 4.5 G: 4; .5 INJECTION, POWDER, FOR SOLUTION INTRAVENOUS at 12:03

## 2020-03-04 RX ADMIN — GABAPENTIN 300 MG: 300 CAPSULE ORAL at 09:03

## 2020-03-04 RX ADMIN — INSULIN ASPART 5 UNITS: 100 INJECTION, SOLUTION INTRAVENOUS; SUBCUTANEOUS at 01:03

## 2020-03-04 NOTE — PLAN OF CARE
Problem: Adult Inpatient Plan of Care  Goal: Plan of Care Review  Outcome: Ongoing, Progressing     Problem: Adult Inpatient Plan of Care  Goal: Absence of Hospital-Acquired Illness or Injury  Outcome: Ongoing, Progressing     Problem: Diabetes Comorbidity  Goal: Blood Glucose Level Within Desired Range  Outcome: Ongoing, Progressing     Problem: Diabetic Ketoacidosis  Goal: Fluid and Electrolyte Balance with Absence of Ketosis  Outcome: Ongoing, Progressing     Problem: Skin Injury Risk Increased  Goal: Skin Health and Integrity  Outcome: Ongoing, Progressing

## 2020-03-04 NOTE — PT/OT/SLP PROGRESS
"Occupational Therapy      Patient Name:  Cali Mabry   MRN:  [id number]    Patient not seen today secondary to patient stated that he feels fatigued and is "having hallucinations". Patient stated that "tomorrow would be better". Will follow-up as able.    HUMBERTO Cornejo  3/4/2020  "

## 2020-03-04 NOTE — PT/OT/SLP PROGRESS
Physical Therapy      Patient Name:  Cali Mabry   MRN:  [id number]    Patient not seen today secondary to Patient fatigue(and increased headache).  Pt reports he has sat up in the chair for ~3 hours today. Reports he is tired and has a bad headache.  Charge nurse, Regine, in pt's room assessing vitals due to pt not feeling well.  However, nothing alarming with vitals.  Pt's nurse, Luli, notified.  Educated pt on scooting self to HOB when feet are touching bottom of bed to decrease pressure sores to B feet.  Scooted to HOB with SBA and B heels floating with bed alarm on. Will follow-up as able.    Rachell Valencia, PTA

## 2020-03-04 NOTE — PT/OT/SLP PROGRESS
Physical Therapy      Patient Name:  Cali Mabry   MRN:  [id number]    Patient not seen today secondary to transport present to take pt down for CT scan. Will follow-up as able.    Rachell Valencia PTA

## 2020-03-04 NOTE — SUBJECTIVE & OBJECTIVE
Past Medical History:   Diagnosis Date    Diabetes mellitus     Hepatitis     HTN (hypertension)     Pancreatitis        Past Surgical History:   Procedure Laterality Date    CARDIAC SURGERY  1999    stent placed. ([facility])    SHOULDER SURGERY  2013    right shoulder, spur removal.       Review of patient's allergies indicates:  No Known Allergies    No current facility-administered medications on file prior to encounter.      Current Outpatient Medications on File Prior to Encounter   Medication Sig    atorvastatin (LIPITOR) 40 MG tablet Take 40 mg by mouth once daily.    gabapentin (NEURONTIN) 300 MG capsule Take 300 mg by mouth.    ibuprofen (ADVIL,MOTRIN) 600 MG tablet Take 1 tablet (600 mg total) by mouth every 6 (six) hours as needed for Pain.    insulin aspart U-100 (NOVOLOG) 100 unit/mL (3 mL) InPn pen Inject 10 Units into the skin.    LANTUS 100 unit/mL injection Inject 15 Units into the skin 2 (two) times daily. (Patient taking differently: Inject 30 Units into the skin once daily. )    naloxone (NARCAN) 1 mg/mL injection 2 mg (1 mg per nostril) by Nasal route as needed for opioid overdose; may repeat in 3 to 5 minutes if not effective. Call 911    naloxone (NARCAN) 4 mg/actuation Spry 4mg by nasal route as needed for opioid overdose; may repeat every 2-3 minutes in alternating nostrils until medical help arrives. Call 911    nicotine (NICODERM CQ) 21 mg/24 hr Place 1 patch onto the skin once daily.    omeprazole (PRILOSEC) 40 MG capsule Take 1 capsule (40 mg total) by mouth once daily.     Family History     Problem Relation (Age of Onset)    Asthma Mother        Tobacco Use    Smoking status: Current Every Day Smoker     Packs/day: 1.00     Years: 20.00     Pack years: 20.00     Types: Cigarettes     Start date: 4/3/1981    Smokeless tobacco: Never Used   Substance and Sexual Activity    Alcohol use: No     Alcohol/week: 0.0 standard drinks    Drug use: Yes     Types: IV,  Marijuana, Heroin     Comment: currently, used today, herion    Sexual activity: Yes     Partners: Female     Review of Systems   Constitutional: Positive for activity change and appetite change.   HENT: Negative for congestion and dental problem.    Eyes: Negative for discharge and itching.   Respiratory: Positive for cough. Negative for apnea and chest tightness.    Cardiovascular: Negative for chest pain and leg swelling.   Gastrointestinal: Negative for abdominal pain, nausea and vomiting.   Endocrine: Negative for cold intolerance.   Genitourinary: Negative for difficulty urinating and dysuria.   Musculoskeletal: Negative for arthralgias and back pain.   Skin: Negative for color change and pallor.   Allergic/Immunologic: Negative for environmental allergies and food allergies.   Neurological: Negative for dizziness and facial asymmetry.   Hematological: Does not bruise/bleed easily.   Psychiatric/Behavioral: Negative for agitation.     Objective:     Vital Signs (Most Recent):  Temp: (!) 102.8 °F (39.3 °C) (03/04/20 0755)  Pulse: (!) 116 (03/04/20 0755)  Resp: 17 (03/04/20 0755)  BP: (!) 122/58 (03/04/20 0755)  SpO2: (!) 91 % (03/04/20 0755) Vital Signs (24h Range):  Temp:  [98.3 °F (36.8 °C)-102.8 °F (39.3 °C)] 102.8 °F (39.3 °C)  Pulse:  [] 116  Resp:  [17-19] 17  SpO2:  [91 %-99 %] 91 %  BP: (105-140)/(56-65) 122/58     Weight: 57.4 kg (126 lb 8.7 oz)  Body mass index is 19.24 kg/m².    Physical Exam   Constitutional: He is oriented to person, place, and time. No distress.   HENT:   Head: Atraumatic.   Eyes: Pupils are equal, round, and reactive to light. EOM are normal.   Neck: Normal range of motion. Neck supple.   Cardiovascular: Normal rate and regular rhythm.   Pulmonary/Chest: Effort normal.   Abdominal: Soft. He exhibits no distension.   Musculoskeletal: Normal range of motion. He exhibits no edema or deformity.   Neurological: He is oriented to person, place, and time. No cranial nerve  deficit. Coordination normal.   Skin: Skin is warm and dry.   Psychiatric: He has a normal mood and affect. His behavior is normal.         CRANIAL NERVES     CN III, IV, VI   Pupils are equal, round, and reactive to light.  Extraocular motions are normal.        Significant Labs:   CBC:   Recent Labs   Lab 03/03/20 0546 03/03/20 1931 03/04/20  0726   WBC 17.32* 16.87* 15.02*   HGB 6.6* 8.6* 9.0*   HCT 21.3* 27.6* 28.3*   * 523* 522*     CMP:   Recent Labs   Lab 03/03/20 0546 03/03/20 1931 03/04/20  0726   * 133* 131*   K 3.9 3.9 3.9   CL 94* 96 95   CO2 30* 30* 28   * 67* 205*   BUN 15 12 8   CREATININE 1.1 1.0 0.9   CALCIUM 7.6* 8.2* 8.0*   PROT  --  7.4  --    ALBUMIN  --  1.6*  --    BILITOT  --  0.4  --    ALKPHOS  --  125  --    AST  --  18  --    ALT  --  14  --    ANIONGAP 7* 7* 8   EGFRNONAA >60 >60 >60       Significant Imaging: reviewed.

## 2020-03-04 NOTE — NURSING
Patient off the unit per transport to test/procedure in wheelchair with O2. No distress noted. Will continue to monitor, will continue with plan of care.

## 2020-03-04 NOTE — NURSING
Endo called to report that patient will have EGD performed tomorrow. Cousin (Marbella) updated per patient's wishes. Patient will be made NPO at midnight. Swallow eval re-ordered; patient's dentures at the bedside, and patient would like regular diabetic diet.

## 2020-03-04 NOTE — NURSING
Report received from Jackie SALINAS. Patient remains free from falls and injury. No distress noted. VSS. Questions encouraged and answered. Patient verbalized understanding. Bed locked and in low position; safety maintained. Call light in reach. White board updated, and explained. No complaint of pain, n/v, diarrhea, or SOB.  Will continue to monitor, will continue with plan of care.

## 2020-03-04 NOTE — PLAN OF CARE
Problem: SLP Goal  Goal: SLP Goal  Description  1. Pt will tolerate puree diet and TL without overt s/s of aspiration.   2. Pt will demonstrate efficient A-P transport/deglutition of soft solid item x3 sessions.    Outcome: Ongoing, Progressing     Pt refusing pureed texture diet. Re-assessed pt's swallowing wearing upper dentures. Pt able to masticate wearing upper dentures. Rec: advance diet to mechanical soft. Con't thin liquids.

## 2020-03-04 NOTE — ASSESSMENT & PLAN NOTE
he is septic with fever,leucocytiosis,tachycardia,tachypnea,likely duo to pneumonia.repeated blood culture.on IV Abx,fever is improved,no growth in repeat blood culture.  He is again febrile,blood culture is repeated,picc line remove and send to culture,will follow up with cultures,  Will repeat chest X ray.

## 2020-03-04 NOTE — PROGRESS NOTES
[facility] Hospital Medicine  Progress Note    Patient Name: Cali Mabry  MRN: [id number]  Patient Class: IP- Inpatient   Admission Date: 2/22/2020  Length of Stay: 11 days  Attending Physician: Kalyani Alberto MD  Primary Care Provider: Primary Doctor No        Subjective:     Principal Problem:Diabetic ketoacidosis without coma associated with diabetes mellitus due to underlying condition        HPI:  54-year-old male past medical history diabetes, hepatitis, hypertension history of polysubstance abuse, pancreatitis presents with abdominal pain for the last 2 days with nausea and vomiting home, and worsening body aches over the last day.  Patient reports he has not had his insulin in some time and reports his glucose is very high.  He states using IV heroin last use few days ago reportedly.  He reports coughing significant amount denies any phlegm production, denies any blood in his sputum, or any chest pain. Denies any blood in her stool, reports normal last bowel movement, reports he is urinating as normal and reports he is extremely thirsty and hungry.  Denies any fevers/chills, no extremity weakness/numbness/tingling.he is in DKA with blood sugar of 635,AG of 26 and CO 2 of 6,he has benen started on DKA protocol,RAMYA with CRT of 1.7,chest  Ray show also sign of pneumonia,patient has been started on broad spectrum IV Abx afterr bload culture is done,    Overview/Hospital Course:  54-year-old male past medical history diabetes, hepatitis, hypertension history of polysubstance abuse,IV heroin,chronic  pancreatitis presents with abdominal pain for the last 2 days with nausea and vomiting home, and worsening body aches over the last day.  Patient reports he has not had his insulin in some time and reports his glucose is very high.  He states using IV heroin last use few days ago reportedly.  He reports coughing significant amount denies any phlegm production, denies any blood in his  sputum, or any chest pain. Denies any blood in her stool, reports normal last bowel movement, reports he is urinating as normal and reports he is extremely thirsty and hungry.  Denies any fevers/chills, no extremity weakness/numbness/tingling.he is in DKA with blood sugar of 635,AG of 26 and CO 2 of 6,he has been  started on DKA protocol,RAMYA with CRT of 1.7,chest  ray show also sign of pneumonia,patient has been started on broad spectrum IV Abx afterr bload culture is done,  AG is closed,started on SQ insulin,started on diet.  Has bacteremia,staph aureus,likely from IV heroin use,alreday on Vanc.repeated blood culture,Echo to R/Oed out  endocarditis.  On Zosyn and azithromycin for pneumonia.  Repeat blood culture no growth,consulted SW for LTAC placement,he is IV drug user,can not go home.reperat blood culture show growth,will place picc line.  Afrberile,no place in LTAC yet.  Podiatry cleaned right heel callus,  Afebrile.  Require more oxygen supplement,chest X ray show possible aspiration,adjusted Abx,changed Rocephin to zosyn,added IS,aspiration precaution,consulted ST.on puree diet,oxygenation is improved,but he is septic with fever,leucocytiosis,tachycardia,tachypnea,likely duo to pneumonia.repeated blood culture.no growth,repeat chest x ray show some improvement.  Consulted PT,OT.  HH is drop today with no sign of bleeding,will transfuse pack of RBC.  Patient refused GI evaluation,EGD for esophogeal dysmotility.  He is again febrile,blood culture is repeated,picc line remove and send to culture,will follow up with cultures,  Will repeat chest X ray.  Has abnormal head CT ,no acute process,but Prominent ventricular system, may be greater than expected for generalized cerebral volume loss.  Normal pressure hydrocephalus is a consideration,consulted neurology.    Past Medical History:   Diagnosis Date    Diabetes mellitus     Hepatitis     HTN (hypertension)     Pancreatitis        Past Surgical History:    Procedure Laterality Date    CARDIAC SURGERY  1999    stent placed. ([facility])    SHOULDER SURGERY  2013    right shoulder, spur removal.       Review of patient's allergies indicates:  No Known Allergies    No current facility-administered medications on file prior to encounter.      Current Outpatient Medications on File Prior to Encounter   Medication Sig    atorvastatin (LIPITOR) 40 MG tablet Take 40 mg by mouth once daily.    gabapentin (NEURONTIN) 300 MG capsule Take 300 mg by mouth.    ibuprofen (ADVIL,MOTRIN) 600 MG tablet Take 1 tablet (600 mg total) by mouth every 6 (six) hours as needed for Pain.    insulin aspart U-100 (NOVOLOG) 100 unit/mL (3 mL) InPn pen Inject 10 Units into the skin.    LANTUS 100 unit/mL injection Inject 15 Units into the skin 2 (two) times daily. (Patient taking differently: Inject 30 Units into the skin once daily. )    naloxone (NARCAN) 1 mg/mL injection 2 mg (1 mg per nostril) by Nasal route as needed for opioid overdose; may repeat in 3 to 5 minutes if not effective. Call 911    naloxone (NARCAN) 4 mg/actuation Spry 4mg by nasal route as needed for opioid overdose; may repeat every 2-3 minutes in alternating nostrils until medical help arrives. Call 911    nicotine (NICODERM CQ) 21 mg/24 hr Place 1 patch onto the skin once daily.    omeprazole (PRILOSEC) 40 MG capsule Take 1 capsule (40 mg total) by mouth once daily.     Family History     Problem Relation (Age of Onset)    Asthma Mother        Tobacco Use    Smoking status: Current Every Day Smoker     Packs/day: 1.00     Years: 20.00     Pack years: 20.00     Types: Cigarettes     Start date: 4/3/1981    Smokeless tobacco: Never Used   Substance and Sexual Activity    Alcohol use: No     Alcohol/week: 0.0 standard drinks    Drug use: Yes     Types: IV, Marijuana, Heroin     Comment: currently, used today, herion    Sexual activity: Yes     Partners: Female     Review of Systems   Constitutional:  Positive for activity change and appetite change.   HENT: Negative for congestion and dental problem.    Eyes: Negative for discharge and itching.   Respiratory: Positive for cough. Negative for apnea and chest tightness.    Cardiovascular: Negative for chest pain and leg swelling.   Gastrointestinal: Negative for abdominal pain, nausea and vomiting.   Endocrine: Negative for cold intolerance.   Genitourinary: Negative for difficulty urinating and dysuria.   Musculoskeletal: Negative for arthralgias and back pain.   Skin: Negative for color change and pallor.   Allergic/Immunologic: Negative for environmental allergies and food allergies.   Neurological: Negative for dizziness and facial asymmetry.   Hematological: Does not bruise/bleed easily.   Psychiatric/Behavioral: Negative for agitation.     Objective:     Vital Signs (Most Recent):  Temp: (!) 102.8 °F (39.3 °C) (03/04/20 0755)  Pulse: (!) 116 (03/04/20 0755)  Resp: 17 (03/04/20 0755)  BP: (!) 122/58 (03/04/20 0755)  SpO2: (!) 91 % (03/04/20 0755) Vital Signs (24h Range):  Temp:  [98.3 °F (36.8 °C)-102.8 °F (39.3 °C)] 102.8 °F (39.3 °C)  Pulse:  [] 116  Resp:  [17-19] 17  SpO2:  [91 %-99 %] 91 %  BP: (105-140)/(56-65) 122/58     Weight: 57.4 kg (126 lb 8.7 oz)  Body mass index is 19.24 kg/m².    Physical Exam   Constitutional: He is oriented to person, place, and time. No distress.   HENT:   Head: Atraumatic.   Eyes: Pupils are equal, round, and reactive to light. EOM are normal.   Neck: Normal range of motion. Neck supple.   Cardiovascular: Normal rate and regular rhythm.   Pulmonary/Chest: Effort normal.   Abdominal: Soft. He exhibits no distension.   Musculoskeletal: Normal range of motion. He exhibits no edema or deformity.   Neurological: He is oriented to person, place, and time. No cranial nerve deficit. Coordination normal.   Skin: Skin is warm and dry.   Psychiatric: He has a normal mood and affect. His behavior is normal.         CRANIAL NERVES      CN III, IV, VI   Pupils are equal, round, and reactive to light.  Extraocular motions are normal.        Significant Labs:   CBC:   Recent Labs   Lab 03/03/20  0546 03/03/20 1931 03/04/20  0726   WBC 17.32* 16.87* 15.02*   HGB 6.6* 8.6* 9.0*   HCT 21.3* 27.6* 28.3*   * 523* 522*     CMP:   Recent Labs   Lab 03/03/20  0546 03/03/20 1931 03/04/20  0726   * 133* 131*   K 3.9 3.9 3.9   CL 94* 96 95   CO2 30* 30* 28   * 67* 205*   BUN 15 12 8   CREATININE 1.1 1.0 0.9   CALCIUM 7.6* 8.2* 8.0*   PROT  --  7.4  --    ALBUMIN  --  1.6*  --    BILITOT  --  0.4  --    ALKPHOS  --  125  --    AST  --  18  --    ALT  --  14  --    ANIONGAP 7* 7* 8   EGFRNONAA >60 >60 >60       Significant Imaging: reviewed.      Assessment/Plan:      * Diabetic ketoacidosis without coma associated with diabetes mellitus due to underlying condition  DKA resolved  Now with hypoglycemia  Has received D50, patient eating well  If hypoglycemia persist, will start D51/2NS      Encephalopathy, toxic    Has abnormal head CT ,no acute process,but Prominent ventricular system, may be greater than expected for generalized cerebral volume loss.  Normal pressure hydrocephalus is a consideration,consulted neurology.      Somnolence  As above,      Dysphagia    Patient refused GI evaluation,EGD for esophogeal dysmotility.      Sepsis   he is septic with fever,leucocytiosis,tachycardia,tachypnea,likely duo to pneumonia.repeated blood culture.on IV Abx,fever is improved,no growth in repeat blood culture.  He is again febrile,blood culture is repeated,picc line remove and send to culture,will follow up with cultures,  Will repeat chest X ray.        Acute respiratory failure with hypoxia  Duo to pneumonia,Require more oxygen supplement,chest X ray show possible aspiration,adjusted Abx,chnaged Rocephin to zosyn,added IS,aspiration precaution,consulted ST.on puree diet,oxygenation is improving slowly.    Patient refused GI evaluation,EGD  for esophogeal dysmotility.    MRSA bacteremia  Blood cultures (+) MRSA  Likely from IV heroin use  Continue Vancomycin  Blood cultures repeated  Echo performed: WNL  Repeat blood culture no growth,consulted SW for LTAC placement,he is IV drug user,can not go home.reperat blood culture show growth, placed picc line.    Afrberile,no place in LTAC yet.  Afebrile.        Pneumonia due to infectious organism  Per clinical presentation and imaging,will continue with IV Abx.no growth,repeat chest x ray show some improvement.      Chronic hepatitis C  Will monitor.      Anemia of chronic disease    HH is drop today with no sign of bleeding,will transfuse pack of RBC.      Intravenous drug abuse  Consult on IV heroin cessation use  Patient states that he quit 3-4 weeks ago      Hyperlipidemia  On statin.      Pancreatitis, chronic  Stable at this  time.      Essential hypertension  Stable at this time with no meds  Will monitor throughout hospital course    Type 2 diabetes mellitus, uncontrolled  Currently hypoglycemia  Will monitor blood glucose q.a.c. and HS  Will adjust regimen based on blood glucose trends        VTE Risk Mitigation (From admission, onward)         Ordered     enoxaparin injection 40 mg  Daily      02/22/20 1157     IP VTE HIGH RISK PATIENT  Once      02/22/20 1157     Place DAWIT hose  Until discontinued      02/22/20 1157                      Kalyani Alberto MD  Department of Hospital Medicine   [facility]

## 2020-03-04 NOTE — PLAN OF CARE
Problem: SLP Goal  Goal: SLP Goal  Description  1. Pt will tolerate puree diet and TL without overt s/s of aspiration. - GOAL MET 3/4/2020  2. Pt will demonstrate efficient A-P transport/deglutition of soft solid item x3 sessions.  - met x1 session 3/4  3. Pt will tolerate mechanical soft diet and thin liquids without overt s/s of aspiration  3/4/2020 1352 by Sharmila Love CCC-SLP  Outcome: Ongoing, Progressing        Pt's diet advance to mechanical soft. Pt refusing pureed diet.

## 2020-03-04 NOTE — SIGNIFICANT EVENT
"Staff called for AMS around 6 :40 PM   The pat was seen and examined at bed side.   Cc: " I am not feeling, well" " I have fever"  O/e   Vitals:    03/03/20 1909   BP: (!) 140/65   Pulse: 97   Resp: 18   Temp: (!) 102.6 °F (39.2 °C)     .physica  Drowsy, easily arosable, speak in full sentences but slow and at times slurred  Neuro: Drowsy, oriented to space, time and person, moves all four limbs spontaneously, but slow movements all over,   CVS: Tachy, regular, at 110-120 bpm, s1s2 audible, no m/r/g  GI: Soft, nt, bs +,   Chest: Crackles on the right frontal lower lobe portion.   Psych: stable,   EXTREMITIES: PICC line in       A/p:   Mr. Mabry is 54-year-old male past medical history diabetes, hepatitis, hypertension history of polysubstance abuse,IV heroin,chronic  pancreatitis presents with abdominal pain for 2 days PTA on 02/22/2020 with nausea and vomiting home, and worsening body aches over the last day, was admitted for DKA in the setting of MRSA bacteremia,   Preceding the current change in clinical status, the patient had been very verbal, although argumentative per nursing staff, completed blood transfusion earlier or around 4:30 PM. This afternoon also visited by her mother. The patient reported she did not give her any medication from outside, or any drug from outside.     1) AMS: Drowsy to stuporous,   -Plan:   -Ordred CT head without contrast, STAT  -Ordered chest xray   -Ordered UDS.   -Will sign out to the nocturnist.     2) FEVER   -The pt was already on Vanc and ZOsyn for  MRSA bacteremia , repeat culture as of 3/1/2020 negative,   -ECHO has already ruled out endocarditis.   -Resend blood culture, one from the PICC, and one from other peripheral site.   - Urinalysis with reflex to urine culture   -OrderedTylenol 1 g  -Continue current broad spectrum antibiotics for now.     3) Post transfusion reaction  -Possible  -Will give Benadryl and Recommend steroid one shot after the xray and CT head has " ruled out other causes.     Case to be signed out to the staff nocturnist.       GORGE Rene

## 2020-03-04 NOTE — NURSING
"Patient remains free from falls and injury; no distress noted. Patient OOB in chair. No complaint of pain at this time.Patient's mother brought dentures, and speech eval was completed. Diet advanced to mechanical soft from puree. Patient stated "the food tastes way better," and he was able to eat 50% of his food. Tolerating diet well. Will continue to monitor.   "

## 2020-03-04 NOTE — NURSING
Patient with MEWS score of 5 due to elevated temp and tachycardia. MD and charge nurse notified. Orders placed by MD. Family at the bedside updated on plan of care. Tylenol provided to reduce temp. Patient OOB to chair. Hygiene assistance provided; linen changed. Patient educated on anti-depressant medications; verbalized understanding. Order for CT placed; patient notified. Will continue to monitor.

## 2020-03-04 NOTE — CONSULTS
[facility]  Neurology  Consult Note    Patient Name: Cali Mabry  MRN: [id number]  Admission Date: 2/22/2020  Hospital Length of Stay: 11 days  Code Status: Full Code   Attending Provider: Kalyani Alberto MD   Consulting Provider: Carlos Boone MD  Primary Care Physician: Primary Doctor No  Principal Problem:Diabetic ketoacidosis without coma associated with diabetes mellitus due to underlying condition    Inpatient consult to Neurology  Consult performed by: Carlos Boone MD  Consult ordered by: Kalyani Alberto MD        Subjective:     Chief Complaint/Reason for consult: Abnormal head CT     HPI: 53 y/o male with medical Hx as listed below comes to ED for abdominal pain, nausea, vomiting, and general malaise. Pt found to be in DKA, with MRSA bacteremia. He is a know IV drug user. Pt has been confused yesterday for which head CT was obtained showing large ventricles although no signs of obstruction; no large area infarction. He denias headaches, hallucinations, visual or speech disturbances, unilateral weakness or numbness (he does have numbness in feet from DM-related neuropathy).    Past Medical History:   Diagnosis Date    Diabetes mellitus     Hepatitis     HTN (hypertension)     Pancreatitis        Past Surgical History:   Procedure Laterality Date    CARDIAC SURGERY  1999    stent placed. ([facility])    SHOULDER SURGERY  2013    right shoulder, spur removal.       Review of patient's allergies indicates:  No Known Allergies    Current Neurological Medications:     No current facility-administered medications on file prior to encounter.      Current Outpatient Medications on File Prior to Encounter   Medication Sig    atorvastatin (LIPITOR) 40 MG tablet Take 40 mg by mouth once daily.    gabapentin (NEURONTIN) 300 MG capsule Take 300 mg by mouth.    ibuprofen (ADVIL,MOTRIN) 600 MG tablet Take 1 tablet (600 mg total) by mouth every 6 (six) hours as needed  for Pain.    insulin aspart U-100 (NOVOLOG) 100 unit/mL (3 mL) InPn pen Inject 10 Units into the skin.    LANTUS 100 unit/mL injection Inject 15 Units into the skin 2 (two) times daily. (Patient taking differently: Inject 30 Units into the skin once daily. )    naloxone (NARCAN) 1 mg/mL injection 2 mg (1 mg per nostril) by Nasal route as needed for opioid overdose; may repeat in 3 to 5 minutes if not effective. Call 911    naloxone (NARCAN) 4 mg/actuation Spry 4mg by nasal route as needed for opioid overdose; may repeat every 2-3 minutes in alternating nostrils until medical help arrives. Call 911    nicotine (NICODERM CQ) 21 mg/24 hr Place 1 patch onto the skin once daily.    omeprazole (PRILOSEC) 40 MG capsule Take 1 capsule (40 mg total) by mouth once daily.      Family History     Problem Relation (Age of Onset)    Asthma Mother        Tobacco Use    Smoking status: Current Every Day Smoker     Packs/day: 1.00     Years: 20.00     Pack years: 20.00     Types: Cigarettes     Start date: 4/3/1981    Smokeless tobacco: Never Used   Substance and Sexual Activity    Alcohol use: No     Alcohol/week: 0.0 standard drinks    Drug use: Yes     Types: IV, Marijuana, Heroin     Comment: currently, used today, herion    Sexual activity: Yes     Partners: Female     Review of Systems   Constitutional: Negative for fever.   HENT: Negative for trouble swallowing.    Eyes: Negative for photophobia.   Respiratory: Negative for shortness of breath.    Cardiovascular: Negative for chest pain.   Gastrointestinal: Negative for abdominal pain.   Genitourinary: Negative for dysuria.   Musculoskeletal: Negative for back pain.   Neurological: Negative for headaches.     Objective:     Vital Signs (Most Recent):  Temp: (!) 102.8 °F (39.3 °C) (03/04/20 0755)  Pulse: (!) 116 (03/04/20 0755)  Resp: 17 (03/04/20 0755)  BP: (!) 122/58 (03/04/20 0755)  SpO2: (!) 91 % (03/04/20 0755) Vital Signs (24h Range):  Temp:  [98.3 °F (36.8  °C)-102.8 °F (39.3 °C)] 102.8 °F (39.3 °C)  Pulse:  [] 116  Resp:  [17-19] 17  SpO2:  [91 %-99 %] 91 %  BP: (105-140)/(56-65) 122/58     Weight: 57.4 kg (126 lb 8.7 oz)  Body mass index is 19.24 kg/m².    Physical Exam   Constitutional: He is oriented to person, place, and time. No distress.   HENT:   Head: Normocephalic.   Eyes: Right eye exhibits no discharge. Left eye exhibits no discharge.   Neck: Normal range of motion.   Cardiovascular: Regular rhythm.   Pulmonary/Chest: Breath sounds normal.   Abdominal: Bowel sounds are normal.   Musculoskeletal: He exhibits no edema.   Neurological: He is oriented to person, place, and time. He has normal strength.   Skin: He is not diaphoretic.   Psychiatric: His speech is normal.       NEUROLOGICAL EXAMINATION:     MENTAL STATUS   Oriented to person, place, and time.   Speech: speech is normal   Level of consciousness: alert    CRANIAL NERVES     CN III, IV, VI   Right pupil: Size: 3 mm. Shape: regular.   Left pupil: Size: 3 mm. Shape: regular.   Nystagmus: none   Ophthalmoparesis: none  Conjugate gaze: present    CN V   Right facial sensation deficit: none  Left facial sensation deficit: none    CN VII   Right facial weakness: none  Left facial weakness: none    CN IX, X   Palate: symmetric    CN XI   Right trapezius strength: normal    CN XII   Tongue deviation: none    MOTOR EXAM     Strength   Strength 5/5 throughout.     SENSORY EXAM   Right arm light touch: normal  Left arm light touch: normal  Right leg light touch: normal  Left leg light touch: normal      Significant Labs:   CBC:   Recent Labs   Lab 03/03/20  0546 03/03/20 1931 03/04/20  0726   WBC 17.32* 16.87* 15.02*   HGB 6.6* 8.6* 9.0*   HCT 21.3* 27.6* 28.3*   * 523* 522*     CMP:   Recent Labs   Lab 03/03/20 0546 03/03/20 1931 03/04/20  0726   * 67* 205*   * 133* 131*   K 3.9 3.9 3.9   CL 94* 96 95   CO2 30* 30* 28   BUN 15 12 8   CREATININE 1.1 1.0 0.9   CALCIUM 7.6* 8.2* 8.0*    PROT  --  7.4  --    ALBUMIN  --  1.6*  --    BILITOT  --  0.4  --    ALKPHOS  --  125  --    AST  --  18  --    ALT  --  14  --    ANIONGAP 7* 7* 8   EGFRNONAA >60 >60 >60       Significant Imaging: I have reviewed all pertinent imaging results/findings within the past 24 hours.   Head CT    Impression       1. No acute intracranial abnormalities, no findings to suggest acute major vascular territory infarct or hemorrhage.  2. Prominent ventricular system, may be greater than expected for generalized cerebral volume loss.  Normal pressure hydrocephalus is a consideration.  3. Stable remote infarct involving the right maykel ludwin.  4. Stable sequela of generalized cerebral volume loss and senescent change.      Electronically signed by: Chetan Sears MD  Date: 03/03/2020  Time: 20:33         Assessment and Plan:     53 y/o male consulted for abnormal head CT    1. Abnormal head CT: CT shows large ventricles which are an unusual findings at his age. This could be seen in NPH (no signs of obstructive hydrocephalus). Images seem to be stable when compared with previous head CT. Pt with no urinary incontience or dementia.    -Will monitor for now. He has chronic condition as well as his long-term heroin addiction that may result in changes in CNS including cognitive impairment.    Active Diagnoses:    Diagnosis Date Noted POA    PRINCIPAL PROBLEM:  Diabetic ketoacidosis without coma associated with diabetes mellitus due to underlying condition [E08.10] 02/22/2020 Yes    Dysphagia [R13.10] 03/03/2020 Yes    Somnolence [R40.0] 03/03/2020 Unknown    Encephalopathy, toxic [G92] 03/03/2020 Unknown    Sepsis [A41.9] 03/01/2020 No    Acute respiratory failure with hypoxia [J96.01] 02/29/2020 Yes    MRSA bacteremia [R78.81] 02/23/2020 Yes    Pneumonia due to infectious organism [J18.9] 02/22/2020 Yes    Intravenous drug abuse [F19.10] 11/23/2017 Yes     Chronic    Hyperlipidemia [E78.5] 11/23/2017 Yes     Chronic     Chronic hepatitis C [B18.2] 11/23/2017 Yes     Chronic    Anemia of chronic disease [D63.8] 11/23/2017 Yes     Chronic    Type 2 diabetes mellitus, uncontrolled [E11.65] 04/03/2016 Yes     Chronic    Pancreatitis, chronic [K86.1] 04/03/2016 Yes    Essential hypertension [I10] 04/03/2016 Yes     Chronic      Problems Resolved During this Admission:    Diagnosis Date Noted Date Resolved POA    RAMYA (acute kidney injury) [N17.9] 02/22/2020 02/24/2020 Yes       VTE Risk Mitigation (From admission, onward)         Ordered     enoxaparin injection 40 mg  Daily      02/22/20 1157     IP VTE HIGH RISK PATIENT  Once      02/22/20 1157     Place DAWIT hose  Until discontinued      02/22/20 1157                Thank you for your consult. I will follow-up with patient. Please contact us if you have any additional questions.    Carlos Boone MD  Neurology  [facility]

## 2020-03-04 NOTE — ASSESSMENT & PLAN NOTE
Has abnormal head CT ,no acute process,but Prominent ventricular system, may be greater than expected for generalized cerebral volume loss.  Normal pressure hydrocephalus is a consideration,consulted neurology.

## 2020-03-04 NOTE — PT/OT/SLP PROGRESS
Speech Language Pathology      Cali Mabry  MRN: [id number]    Patient not seen today secondary to Other (Comment)(NPO for EGD). Will follow-up tomorrow.    Sharmila Love, CCC-SLP

## 2020-03-04 NOTE — PROGRESS NOTES
GI    RN called.  Apparently pt has changed his mind once again and wishes for EGD.  Will do tomorrow.

## 2020-03-04 NOTE — PT/OT/SLP PROGRESS
Speech Language Pathology Treatment    Patient Name:  Cali Mabry   MRN:  [id number]  Admitting Diagnosis: Diabetic ketoacidosis without coma associated with diabetes mellitus due to underlying condition    Recommendations:                 General Recommendations:  Dysphagia therapy  Diet recommendations:  Mechanical soft, Liquid Diet Level: Thin   Aspiration Precautions: wear upper dentures during meal, 1 bite/sip at a time, Alternating bites/sips and Small bites/sips   General Precautions: Standard, contact, fall, other (see comments)(mechanical soft)  Communication strategies:  none    Subjective     Pt seated in chair at bedside upon SLP arrival. Nurse Marina reported EGD is being performed tomorrow so pt is no longer NPO until midnight. Pt refusing pureed diet and only wants to eat solid food.    Patient goals: to eat solid food    Pain/Comfort:  · Pain Rating 1: 0/10    Objective:     Has the patient been evaluated by SLP for swallowing?   Yes  Keep patient NPO? No   Current Respiratory Status: nasal cannula      Pt assisted in using adhesive on dentures prior to placement of upper dentures. Pt attempted to wear lower dentures, however, they were extremely ill-fitting and would not remain in place. Pt observed to masticate 5 trials of soft solids with excessive mastication 2/2 upper dentures being loose. Swallow response was adequate without overt s/s of aspiration. Pt accepted and tolerated thin liquids via straw x6 trials without s/s of aspiration.  Pt educated on safe swallowing strategies.wear upper dentures during meal, 1 bite/sip at a time, Alternating bites/sips and Small bites/sips.    Assessment:     Cali Mabry is a 54 y.o. male with a dx of Diabetic ketoacidosis without coma associated with diabetes mellitus due to underlying condition Pt  presents with functional oral mech and pharyngeal phases of swallow at bedside. Rec: advance diet to mechanical soft per pt's request. EGD scheduled  tomorrow.       Goals:   Multidisciplinary Problems     SLP Goals        Problem: SLP Goal    Goal Priority Disciplines Outcome   SLP Goal     SLP Ongoing, Progressing   Description:  1. Pt will tolerate puree diet and TL without overt s/s of aspiration.   2. Pt will demonstrate efficient A-P transport/deglutition of soft solid item x3 sessions.                     Plan:     · Patient to be seen:  3 x/week   · Plan of Care expires:  03/13/20  · Plan of Care reviewed with:  patient, other (see comments)(niece)   · SLP Follow-Up:  Yes       Discharge recommendations:  (TBD)   Barriers to Discharge:  None    Time Tracking:     SLP Treatment Date:   03/04/20  Speech Start Time:  1259  Speech Stop Time:  1315     Speech Total Time (min):  16 min    Billable Minutes: Treatment Swallowing Dysfunction 16 min    Sharmila Love CCC-SLP  03/04/2020

## 2020-03-05 LAB
ANION GAP SERPL CALC-SCNC: 8 MMOL/L (ref 8–16)
BASOPHILS # BLD AUTO: 0.01 K/UL (ref 0–0.2)
BASOPHILS NFR BLD: 0.1 % (ref 0–1.9)
BUN SERPL-MCNC: 11 MG/DL (ref 6–20)
CALCIUM SERPL-MCNC: 8.2 MG/DL (ref 8.7–10.5)
CHLORIDE SERPL-SCNC: 98 MMOL/L (ref 95–110)
CO2 SERPL-SCNC: 30 MMOL/L (ref 23–29)
CREAT SERPL-MCNC: 0.9 MG/DL (ref 0.5–1.4)
DIFFERENTIAL METHOD: ABNORMAL
EOSINOPHIL # BLD AUTO: 0 K/UL (ref 0–0.5)
EOSINOPHIL NFR BLD: 0.3 % (ref 0–8)
ERYTHROCYTE [DISTWIDTH] IN BLOOD BY AUTOMATED COUNT: 15.1 % (ref 11.5–14.5)
EST. GFR  (AFRICAN AMERICAN): >60 ML/MIN/1.73 M^2
EST. GFR  (NON AFRICAN AMERICAN): >60 ML/MIN/1.73 M^2
GLUCOSE SERPL-MCNC: 105 MG/DL (ref 70–110)
HCT VFR BLD AUTO: 27.6 % (ref 40–54)
HGB BLD-MCNC: 8.4 G/DL (ref 14–18)
IMM GRANULOCYTES # BLD AUTO: 0.07 K/UL (ref 0–0.04)
IMM GRANULOCYTES NFR BLD AUTO: 0.7 % (ref 0–0.5)
LYMPHOCYTES # BLD AUTO: 1.4 K/UL (ref 1–4.8)
LYMPHOCYTES NFR BLD: 13.6 % (ref 18–48)
MCH RBC QN AUTO: 25.1 PG (ref 27–31)
MCHC RBC AUTO-ENTMCNC: 30.4 G/DL (ref 32–36)
MCV RBC AUTO: 82 FL (ref 82–98)
MONOCYTES # BLD AUTO: 0.7 K/UL (ref 0.3–1)
MONOCYTES NFR BLD: 7 % (ref 4–15)
NEUTROPHILS # BLD AUTO: 8.1 K/UL (ref 1.8–7.7)
NEUTROPHILS NFR BLD: 78.3 % (ref 38–73)
NRBC BLD-RTO: 0 /100 WBC
PHOSPHATE SERPL-MCNC: 2.8 MG/DL (ref 2.7–4.5)
PLATELET # BLD AUTO: 517 K/UL (ref 150–350)
PMV BLD AUTO: 8.6 FL (ref 9.2–12.9)
POCT GLUCOSE: 147 MG/DL (ref 70–110)
POCT GLUCOSE: 396 MG/DL (ref 70–110)
POTASSIUM SERPL-SCNC: 3.7 MMOL/L (ref 3.5–5.1)
RBC # BLD AUTO: 3.35 M/UL (ref 4.6–6.2)
SODIUM SERPL-SCNC: 136 MMOL/L (ref 136–145)
VANCOMYCIN TROUGH SERPL-MCNC: 16 UG/ML (ref 10–22)
WBC # BLD AUTO: 10.39 K/UL (ref 3.9–12.7)

## 2020-03-05 PROCEDURE — 25000003 PHARM REV CODE 250: Performed by: HOSPITALIST

## 2020-03-05 PROCEDURE — 63600175 PHARM REV CODE 636 W HCPCS: Performed by: HOSPITALIST

## 2020-03-05 PROCEDURE — D9220A PRA ANESTHESIA: Mod: ,,, | Performed by: ANESTHESIOLOGY

## 2020-03-05 PROCEDURE — 43239 EGD BIOPSY SINGLE/MULTIPLE: CPT | Performed by: INTERNAL MEDICINE

## 2020-03-05 PROCEDURE — 84100 ASSAY OF PHOSPHORUS: CPT

## 2020-03-05 PROCEDURE — 36415 COLL VENOUS BLD VENIPUNCTURE: CPT

## 2020-03-05 PROCEDURE — 97530 THERAPEUTIC ACTIVITIES: CPT

## 2020-03-05 PROCEDURE — 11000001 HC ACUTE MED/SURG PRIVATE ROOM

## 2020-03-05 PROCEDURE — 37000008 HC ANESTHESIA 1ST 15 MINUTES: Performed by: INTERNAL MEDICINE

## 2020-03-05 PROCEDURE — 85025 COMPLETE CBC W/AUTO DIFF WBC: CPT

## 2020-03-05 PROCEDURE — 80202 ASSAY OF VANCOMYCIN: CPT

## 2020-03-05 PROCEDURE — D9220A PRA ANESTHESIA: ICD-10-PCS | Mod: ,,, | Performed by: ANESTHESIOLOGY

## 2020-03-05 PROCEDURE — 88305 TISSUE EXAM BY PATHOLOGIST: ICD-10-PCS | Mod: 26,,, | Performed by: PATHOLOGY

## 2020-03-05 PROCEDURE — 27201012 HC FORCEPS, HOT/COLD, DISP: Performed by: INTERNAL MEDICINE

## 2020-03-05 PROCEDURE — 80048 BASIC METABOLIC PNL TOTAL CA: CPT

## 2020-03-05 PROCEDURE — 88305 TISSUE EXAM BY PATHOLOGIST: CPT | Mod: 26,,, | Performed by: PATHOLOGY

## 2020-03-05 PROCEDURE — 88305 TISSUE EXAM BY PATHOLOGIST: CPT | Performed by: PATHOLOGY

## 2020-03-05 PROCEDURE — 37000009 HC ANESTHESIA EA ADD 15 MINS: Performed by: INTERNAL MEDICINE

## 2020-03-05 PROCEDURE — 63600175 PHARM REV CODE 636 W HCPCS: Performed by: NURSE ANESTHETIST, CERTIFIED REGISTERED

## 2020-03-05 RX ORDER — PROPOFOL 10 MG/ML
VIAL (ML) INTRAVENOUS
Status: DISCONTINUED | OUTPATIENT
Start: 2020-03-05 | End: 2020-03-05

## 2020-03-05 RX ORDER — LIDOCAINE HYDROCHLORIDE 20 MG/ML
INJECTION INTRAVENOUS
Status: DISCONTINUED | OUTPATIENT
Start: 2020-03-05 | End: 2020-03-05

## 2020-03-05 RX ORDER — LIDOCAINE HYDROCHLORIDE 20 MG/ML
INJECTION, SOLUTION EPIDURAL; INFILTRATION; INTRACAUDAL; PERINEURAL
Status: DISPENSED
Start: 2020-03-05 | End: 2020-03-06

## 2020-03-05 RX ORDER — PROPOFOL 10 MG/ML
VIAL (ML) INTRAVENOUS
Status: DISPENSED
Start: 2020-03-05 | End: 2020-03-06

## 2020-03-05 RX ADMIN — HYDROXYZINE PAMOATE 25 MG: 25 CAPSULE ORAL at 09:03

## 2020-03-05 RX ADMIN — POTASSIUM PHOSPHATE, MONOBASIC 500 MG: 500 TABLET, SOLUBLE ORAL at 08:03

## 2020-03-05 RX ADMIN — TRAMADOL HYDROCHLORIDE 50 MG: 50 TABLET, COATED ORAL at 04:03

## 2020-03-05 RX ADMIN — INSULIN ASPART 5 UNITS: 100 INJECTION, SOLUTION INTRAVENOUS; SUBCUTANEOUS at 08:03

## 2020-03-05 RX ADMIN — BENZONATATE 100 MG: 100 CAPSULE ORAL at 08:03

## 2020-03-05 RX ADMIN — INSULIN DETEMIR 10 UNITS: 100 INJECTION, SOLUTION SUBCUTANEOUS at 08:03

## 2020-03-05 RX ADMIN — Medication 50 MG: at 02:03

## 2020-03-05 RX ADMIN — VANCOMYCIN HYDROCHLORIDE 1000 MG: 1 INJECTION, POWDER, LYOPHILIZED, FOR SOLUTION INTRAVENOUS at 07:03

## 2020-03-05 RX ADMIN — HYDROXYZINE PAMOATE 25 MG: 25 CAPSULE ORAL at 05:03

## 2020-03-05 RX ADMIN — VANCOMYCIN HYDROCHLORIDE 1000 MG: 1 INJECTION, POWDER, LYOPHILIZED, FOR SOLUTION INTRAVENOUS at 05:03

## 2020-03-05 RX ADMIN — Medication 6 MG: at 08:03

## 2020-03-05 RX ADMIN — PROPOFOL 70 MG: 10 INJECTION, EMULSION INTRAVENOUS at 02:03

## 2020-03-05 RX ADMIN — PIPERACILLIN AND TAZOBACTAM 4.5 G: 4; .5 INJECTION, POWDER, FOR SOLUTION INTRAVENOUS at 02:03

## 2020-03-05 RX ADMIN — TRAMADOL HYDROCHLORIDE 50 MG: 50 TABLET, COATED ORAL at 09:03

## 2020-03-05 RX ADMIN — TRAMADOL HYDROCHLORIDE 50 MG: 50 TABLET, COATED ORAL at 05:03

## 2020-03-05 RX ADMIN — ACETAMINOPHEN 650 MG: 325 TABLET ORAL at 01:03

## 2020-03-05 RX ADMIN — PANTOPRAZOLE SODIUM 40 MG: 40 TABLET, DELAYED RELEASE ORAL at 05:03

## 2020-03-05 RX ADMIN — GABAPENTIN 300 MG: 300 CAPSULE ORAL at 08:03

## 2020-03-05 RX ADMIN — PIPERACILLIN AND TAZOBACTAM 4.5 G: 4; .5 INJECTION, POWDER, FOR SOLUTION INTRAVENOUS at 11:03

## 2020-03-05 NOTE — PT/OT/SLP PROGRESS
Physical Therapy      Patient Name:  Cali Mabry   MRN:  [id number]  12:14-12:22 (TA)    Pt found with bed alarm sounding in process of sitting up at EOB without assistance.  CG in room  Pt able to sit at EOB with SBA a Total of 8m.  Pt declined to perform any ther ex or ambulation as he is waiting to be transported to [facility].  Pt states that he can't participate 2/2 he hasn't eaten anything and he is too weak, pain in R side, is hungry, Is angry.... Pt/CG educated on PT role and importance of mobility to recovery.  CG verbalized understanding and was trying to encourage patient to participate.  Patient needs reinforcement as he becomes agitated with education and encouragement.  Will follow-up as time allows.         Eri Vaqsuez, PT

## 2020-03-05 NOTE — [employer] PATIENT INSTRUCTIONS
Discharge Summary/Instructions after an Endoscopic Procedure  Patient Name: Cali Mabry  Patient MRN: [id number]  Patient YOB: 1965 Thursday, March 05, 2020  Brinda Rene MD  RESTRICTIONS:  During your procedure today, you received medications for sedation.  These   medications may affect your judgment, balance and coordination.  Therefore,   for 24 hours, you have the following restrictions:   - DO NOT drive a car, operate machinery, make legal/financial decisions,   sign important papers or drink alcohol.    ACTIVITY:  Today: no heavy lifting, straining or running due to procedural   sedation/anesthesia.  The following day: return to full activity including work.  DIET:  Eat and drink normally unless instructed otherwise.     TREATMENT FOR COMMON SIDE EFFECTS:  - Mild abdominal pain, nausea, belching, bloating or excessive gas:  rest,   eat lightly and use a heating pad.  - Sore Throat: treat with throat lozenges and/or gargle with warm salt   water.  - Because air was used during the procedure, expelling large amounts of air   from your rectum or belching is normal.  - If a bowel prep was taken, you may not have a bowel movement for 1-3 days.    This is normal.  SYMPTOMS TO WATCH FOR AND REPORT TO YOUR PHYSICIAN:  1. Abdominal pain or bloating, other than gas cramps.  2. Chest pain.  3. Back pain.  4. Signs of infection such as: chills or fever occurring within 24 hours   after the procedure.  5. Rectal bleeding, which would show as bright red, maroon, or black stools.   (A tablespoon of blood from the rectum is not serious, especially if   hemorrhoids are present.)  6. Vomiting.  7. Weakness or dizziness.  GO DIRECTLY TO THE NEAREST EMERGENCY ROOM IF YOU HAVE ANY OF THE FOLLOWING:      Difficulty breathing              Chills and/or fever over 101 F   Persistent vomiting and/or vomiting blood   Severe abdominal pain   Severe chest pain   Black, tarry stools   Bleeding- more than one  tablespoon   Any other symptom or condition that you feel may need urgent attention  Your doctor recommends these additional instructions:  If any biopsies were taken, your doctors clinic will contact you in 1 to 2   weeks with any results.  - Return patient to hospital beck for ongoing care.   - Await pathology results.   - Use Protonix (pantoprazole) 40 mg PO daily.  For questions, problems or results please call your physician - Brinda Rene MD at Work:  [phone].  [facility] can be reached at [phone]     IF A COMPLICATION OR EMERGENCY SITUATION ARISES AND YOU ARE UNABLE TO REACH   YOUR PHYSICIAN - GO DIRECTLY TO THE EMERGENCY ROOM.  Brinda Rene MD  3/5/2020 3:08:40 PM  This report has been verified and signed electronically.  [employer]

## 2020-03-05 NOTE — ASSESSMENT & PLAN NOTE
Patient initially refused GI evaluation,EGD for esophogeal dysmotility.      Finally he is agree with EGD,

## 2020-03-05 NOTE — NURSING
Patient going to Endo via stretcher with transporter, surgical mask in place, oxygen 2lnc and Zosyn IV running. Notified Nilay/juanjose that pt removed lower dentures, he was unable to remove top dentures. Verbalized understandings

## 2020-03-05 NOTE — ASSESSMENT & PLAN NOTE
he is septic with fever,leucocytiosis,tachycardia,tachypnea,likely duo to pneumonia.repeated blood culture.on IV Abx,fever is improved,no growth in repeat blood culture.  He is again febrile,blood culture is repeated,picc line remove and send to culture,will follow up with cultures,    CT chest show septic emboli,alreday on broad spectrum IV Abx,  Fever is resolved,leucocytosis is resolved,

## 2020-03-05 NOTE — OR NURSING
REPORT GIVEN TO RITA PARSON. PROCEDURE AND RECOVERY COMPLETED. REVIEWED RESULTS WITH PATIENT, AND COPY OF REPORT GIVEN TO PATIENT . PATIENT READY TO RETURN TO ROOM.

## 2020-03-05 NOTE — TRANSFER OF CARE
"Anesthesia Transfer of Care Note    Patient: Cali Mabry    Procedure(s) Performed: Procedure(s) (LRB):  EGD (ESOPHAGOGASTRODUODENOSCOPY) (N/A)    Patient location: GI    Anesthesia Type: general    Transport from OR: Transported from OR on 2-3 L/min O2 by NC with adequate spontaneous ventilation    Post pain: adequate analgesia    Post assessment: no apparent anesthetic complications    Post vital signs: stable    Level of consciousness: awake, alert and oriented    Nausea/Vomiting: no nausea/vomiting    Complications: none    Transfer of care protocol was followed      Last vitals:   Visit Vitals  /66 (BP Location: Left arm, Patient Position: Lying)   Pulse 98   Temp 37.2 °C (99 °F) (Oral)   Resp 18   Ht 5' 8" (1.727 m)   Wt 57.4 kg (126 lb 8.7 oz)   SpO2 99%   BMI 19.24 kg/m²     "

## 2020-03-05 NOTE — PT/OT/SLP PROGRESS
Speech Language Pathology      Cali Mabry  MRN: [id number]    Patient not seen today secondary to Other (Comment)(NPO for EGD-unable to give PO trials). Will follow-up tomorrow.    Sharmila Love, CCC-SLP

## 2020-03-05 NOTE — PROGRESS NOTES
[facility] Hospital Medicine  Progress Note    Patient Name: Cali Mabry  MRN: [id number]  Patient Class: IP- Inpatient   Admission Date: 2/22/2020  Length of Stay: 12 days  Attending Physician: Kalyani Alberto MD  Primary Care Provider: Primary Doctor No        Subjective:     Principal Problem:Diabetic ketoacidosis without coma associated with diabetes mellitus due to underlying condition        HPI:  54-year-old male past medical history diabetes, hepatitis, hypertension history of polysubstance abuse, pancreatitis presents with abdominal pain for the last 2 days with nausea and vomiting home, and worsening body aches over the last day.  Patient reports he has not had his insulin in some time and reports his glucose is very high.  He states using IV heroin last use few days ago reportedly.  He reports coughing significant amount denies any phlegm production, denies any blood in his sputum, or any chest pain. Denies any blood in her stool, reports normal last bowel movement, reports he is urinating as normal and reports he is extremely thirsty and hungry.  Denies any fevers/chills, no extremity weakness/numbness/tingling.he is in DKA with blood sugar of 635,AG of 26 and CO 2 of 6,he has benen started on DKA protocol,RAMYA with CRT of 1.7,chest  Ray show also sign of pneumonia,patient has been started on broad spectrum IV Abx afterr bload culture is done,    Overview/Hospital Course:  54-year-old male past medical history diabetes, hepatitis, hypertension history of polysubstance abuse,IV heroin,chronic  pancreatitis presents with abdominal pain for the last 2 days with nausea and vomiting home, and worsening body aches over the last day.  Patient reports he has not had his insulin in some time and reports his glucose is very high.  He states using IV heroin last use few days ago reportedly.  He reports coughing significant amount denies any phlegm production, denies any blood in his  sputum, or any chest pain. Denies any blood in her stool, reports normal last bowel movement, reports he is urinating as normal and reports he is extremely thirsty and hungry.  Denies any fevers/chills, no extremity weakness/numbness/tingling.he is in DKA with blood sugar of 635,AG of 26 and CO 2 of 6,he has been  started on DKA protocol,RAMYA with CRT of 1.7,chest  ray show also sign of pneumonia,patient has been started on broad spectrum IV Abx afterr bload culture is done,  AG is closed,started on SQ insulin,started on diet.  Has bacteremia,staph aureus,likely from IV heroin use,alreday on Vanc.repeated blood culture,Echo to R/Oed out  endocarditis.  On Zosyn and azithromycin for pneumonia.  Repeat blood culture no growth,consulted SW for LTAC placement,he is IV drug user,can not go home.reperat blood culture show growth,will place picc line.  Afrberile,no place in LTAC yet.  Podiatry cleaned right heel callus,  Afebrile.  Require more oxygen supplement,chest X ray show possible aspiration,adjusted Abx,changed Rocephin to zosyn,added IS,aspiration precaution,consulted ST.on puree diet,oxygenation is improved,but he is septic with fever,leucocytiosis,tachycardia,tachypnea,likely duo to pneumonia.repeated blood culture.no growth,repeat chest x ray show some improvement.  Consulted PT,OT.  HH is drop today with no sign of bleeding,will transfuse pack of RBC.  Patient refused GI evaluation,EGD for esophogeal dysmotility.  He is again febrile,blood culture is repeated,picc line remove and send to culture,will follow up with cultures,  Will repeat chest X ray.  Has abnormal head CT ,no acute process,but Prominent ventricular system, may be greater than expected for generalized cerebral volume loss.  Normal pressure hydrocephalus is a consideration,consulted neurology.  CT chest show septic emboli,alreday on broad spectrum IV Abx,  Finally he is agree with EGD,    Past Medical History:   Diagnosis Date    Diabetes  mellitus     Hepatitis     HTN (hypertension)     Pancreatitis        Past Surgical History:   Procedure Laterality Date    CARDIAC SURGERY  1999    stent placed. ([facility])    SHOULDER SURGERY  2013    right shoulder, spur removal.       Review of patient's allergies indicates:  No Known Allergies    No current facility-administered medications on file prior to encounter.      Current Outpatient Medications on File Prior to Encounter   Medication Sig    atorvastatin (LIPITOR) 40 MG tablet Take 40 mg by mouth once daily.    gabapentin (NEURONTIN) 300 MG capsule Take 300 mg by mouth.    ibuprofen (ADVIL,MOTRIN) 600 MG tablet Take 1 tablet (600 mg total) by mouth every 6 (six) hours as needed for Pain.    insulin aspart U-100 (NOVOLOG) 100 unit/mL (3 mL) InPn pen Inject 10 Units into the skin.    LANTUS 100 unit/mL injection Inject 15 Units into the skin 2 (two) times daily. (Patient taking differently: Inject 30 Units into the skin once daily. )    naloxone (NARCAN) 1 mg/mL injection 2 mg (1 mg per nostril) by Nasal route as needed for opioid overdose; may repeat in 3 to 5 minutes if not effective. Call 911    naloxone (NARCAN) 4 mg/actuation Spry 4mg by nasal route as needed for opioid overdose; may repeat every 2-3 minutes in alternating nostrils until medical help arrives. Call 911    nicotine (NICODERM CQ) 21 mg/24 hr Place 1 patch onto the skin once daily.    omeprazole (PRILOSEC) 40 MG capsule Take 1 capsule (40 mg total) by mouth once daily.     Family History     Problem Relation (Age of Onset)    Asthma Mother        Tobacco Use    Smoking status: Current Every Day Smoker     Packs/day: 1.00     Years: 20.00     Pack years: 20.00     Types: Cigarettes     Start date: 4/3/1981    Smokeless tobacco: Never Used   Substance and Sexual Activity    Alcohol use: No     Alcohol/week: 0.0 standard drinks    Drug use: Yes     Types: IV, Marijuana, Heroin     Comment: currently, used today,  herion    Sexual activity: Yes     Partners: Female     Review of Systems   Constitutional: Positive for activity change and appetite change.   HENT: Negative for congestion and dental problem.    Eyes: Negative for discharge and itching.   Respiratory: Positive for cough. Negative for apnea and chest tightness.    Cardiovascular: Negative for chest pain and leg swelling.   Gastrointestinal: Negative for abdominal pain, nausea and vomiting.   Endocrine: Negative for cold intolerance.   Genitourinary: Negative for difficulty urinating and dysuria.   Musculoskeletal: Negative for arthralgias and back pain.   Skin: Negative for color change and pallor.   Allergic/Immunologic: Negative for environmental allergies and food allergies.   Neurological: Negative for dizziness and facial asymmetry.   Hematological: Does not bruise/bleed easily.   Psychiatric/Behavioral: Negative for agitation.     Objective:     Vital Signs (Most Recent):  Temp: 97 °F (36.1 °C) (03/05/20 0627)  Pulse: 110 (03/05/20 0627)  Resp: (!) 26 (03/05/20 0627)  BP: (!) 155/75 (03/05/20 0627)  SpO2: 95 % (03/05/20 0627) Vital Signs (24h Range):  Temp:  [97 °F (36.1 °C)-99.5 °F (37.5 °C)] 97 °F (36.1 °C)  Pulse:  [] 110  Resp:  [17-26] 26  SpO2:  [92 %-97 %] 95 %  BP: (105-155)/(56-75) 155/75     Weight: 57.4 kg (126 lb 8.7 oz)  Body mass index is 19.24 kg/m².    Physical Exam   Constitutional: He is oriented to person, place, and time. No distress.   HENT:   Head: Atraumatic.   Eyes: Pupils are equal, round, and reactive to light. EOM are normal.   Neck: Normal range of motion. Neck supple.   Cardiovascular: Normal rate and regular rhythm.   Pulmonary/Chest: Effort normal.   Abdominal: Soft. He exhibits no distension.   Musculoskeletal: Normal range of motion. He exhibits no edema or deformity.   Neurological: He is oriented to person, place, and time. No cranial nerve deficit. Coordination normal.   Skin: Skin is warm and dry.   Psychiatric: He  has a normal mood and affect. His behavior is normal.         CRANIAL NERVES     CN III, IV, VI   Pupils are equal, round, and reactive to light.  Extraocular motions are normal.        Significant Labs:   CBC:   Recent Labs   Lab 03/03/20 1931 03/04/20 0726 03/05/20  0600   WBC 16.87* 15.02* 10.39   HGB 8.6* 9.0* 8.4*   HCT 27.6* 28.3* 27.6*   * 522* 517*     CMP:   Recent Labs   Lab 03/03/20 1931 03/04/20 0726 03/05/20  0600   * 131* 136   K 3.9 3.9 3.7   CL 96 95 98   CO2 30* 28 30*   GLU 67* 205* 105   BUN 12 8 11   CREATININE 1.0 0.9 0.9   CALCIUM 8.2* 8.0* 8.2*   PROT 7.4  --   --    ALBUMIN 1.6*  --   --    BILITOT 0.4  --   --    ALKPHOS 125  --   --    AST 18  --   --    ALT 14  --   --    ANIONGAP 7* 8 8   EGFRNONAA >60 >60 >60       Significant Imaging: reviewed.      Assessment/Plan:      * Diabetic ketoacidosis without coma associated with diabetes mellitus due to underlying condition  DKA resolved  Now with hypoglycemia  Has received D50, patient eating well  If hypoglycemia persist, will start D51/2NS      Encephalopathy, toxic    Has abnormal head CT ,no acute process,but Prominent ventricular system, may be greater than expected for generalized cerebral volume loss.  Normal pressure hydrocephalus is a consideration,consulted neurology.      Somnolence  As above,  Resolved,back to baseline.      Dysphagia    Patient initially refused GI evaluation,EGD for esophogeal dysmotility.      Finally he is agree with EGD,      Sepsis   he is septic with fever,leucocytiosis,tachycardia,tachypnea,likely duo to pneumonia.repeated blood culture.on IV Abx,fever is improved,no growth in repeat blood culture.  He is again febrile,blood culture is repeated,picc line remove and send to culture,will follow up with cultures,    CT chest show septic emboli,alreday on broad spectrum IV Abx,  Fever is resolved,leucocytosis is resolved,    Acute respiratory failure with hypoxia  Duo to pneumonia,Require  more oxygen supplement,chest X ray show possible aspiration,adjusted Abx,chnaged Rocephin to zosyn,added IS,aspiration precaution,consulted ST.on puree diet,oxygenation is improving slowly.    Patient refused GI evaluation,EGD for esophogeal dysmotility.    MRSA bacteremia  Blood cultures (+) MRSA  Likely from IV heroin use  Continue Vancomycin  Blood cultures repeated  Echo performed: WNL  Repeat blood culture no growth,consulted  for LTAC placement,he is IV drug user,can not go home.reperat blood culture show growth, placed picc line.    Afrberile,no place in LTAC yet.  Afebrile.        Pneumonia due to infectious organism  Per clinical presentation and imaging,will continue with IV Abx.no growth,repeat chest x ray show some improvement.      Chronic hepatitis C  Will monitor.      Anemia of chronic disease    HH is drop today with no sign of bleeding,will transfuse pack of RBC.      Intravenous drug abuse  Consult on IV heroin cessation use  Patient states that he quit 3-4 weeks ago      Hyperlipidemia  On statin.      Pancreatitis, chronic  Stable at this  time.      Essential hypertension  Stable at this time with no meds  Will monitor throughout hospital course    Type 2 diabetes mellitus, uncontrolled  Currently hypoglycemia  Will monitor blood glucose q.a.c. and HS  Will adjust regimen based on blood glucose trends        VTE Risk Mitigation (From admission, onward)         Ordered     enoxaparin injection 40 mg  Daily      02/22/20 1157     IP VTE HIGH RISK PATIENT  Once      02/22/20 1157     Place DAWIT hose  Until discontinued      02/22/20 1157                      Kalyani Alberto MD  Department of Hospital Medicine   [facility]

## 2020-03-05 NOTE — PROGRESS NOTES
Pharmacokinetic Assessment Follow Up: IV Vancomycin    Vancomycin serum concentration assessment(s):    The trough level was drawn correctly and can be used to guide therapy at this time. The measurement is within the desired definitive target range of 10 to 20 mcg/mL.    Vancomycin Regimen Plan:    Continue regimen to Vancomycin 1000 mg IV every 12 hours with next serum trough concentration measured at 0530 prior to 3 dose on 3/9/2020     Drug levels (last 3 results):  Recent Labs   Lab Result Units 03/03/20  0547 03/05/20  0601   Vancomycin-Trough ug/mL 12.1 16.0       Pharmacy will continue to follow and monitor vancomycin.    Please contact pharmacy at extension [phone] for questions regarding this assessment.    Thank you for the consult,   Todd Leal Jr       Patient brief summary:  Cali Mabry is a 54 y.o. male initiated on antimicrobial therapy with IV Vancomycin for treatment of bacteremia    Drug Allergies:   Review of patient's allergies indicates:  No Known Allergies    Actual Body Weight:   57.4 kg    Renal Function:   Estimated Creatinine Clearance: 77.1 mL/min (based on SCr of 0.9 mg/dL).,     Dialysis Method (if applicable):  N/A    CBC (last 72 hours):  Recent Labs   Lab Result Units 03/03/20  0546 03/03/20 1931 03/04/20  0726 03/05/20  0600   WBC K/uL 17.32* 16.87* 15.02* 10.39   Hemoglobin g/dL 6.6* 8.6* 9.0* 8.4*   Hematocrit % 21.3* 27.6* 28.3* 27.6*   Platelets K/uL 482* 523* 522* 517*   Gran% % 86.2* 83.9* 84.5* 78.3*   Lymph% % 7.8* 9.5* 8.3* 13.6*   Mono% % 4.3 5.2 6.1 7.0   Eosinophil% % 0.2 0.2 0.1 0.3   Basophil% % 0.1 0.1 0.1 0.1   Differential Method  Automated Automated Automated Automated       Metabolic Panel (last 72 hours):  Recent Labs   Lab Result Units 03/02/20 2056 03/03/20  0546 03/03/20  1931 03/03/20  2312 03/04/20  0726 03/05/20  0600   Sodium mmol/L  --  131* 133*  --  131* 136   Potassium mmol/L  --  3.9 3.9  --  3.9 3.7   Chloride mmol/L  --  94* 96  --  95  98   CO2 mmol/L  --  30* 30*  --  28 30*   Glucose mg/dL 49* 383* 67*  --  205* 105   BUN, Bld mg/dL  --  15 12  --  8 11   Creatinine mg/dL  --  1.1 1.0  --  0.9 0.9   Creatinine, Random Ur mg/dL  --   --   --  20.0*  --   --    Albumin g/dL  --   --  1.6*  --   --   --    Total Bilirubin mg/dL  --   --  0.4  --   --   --    Alkaline Phosphatase U/L  --   --  125  --   --   --    AST U/L  --   --  18  --   --   --    ALT U/L  --   --  14  --   --   --    Phosphorus mg/dL  --  2.7  --   --  2.0* 2.8       Vancomycin Administrations:  vancomycin given in the last 96 hours                     vancomycin in dextrose 5 % 1 gram/250 mL IVPB 1,000 mg (mg) 1,000 mg New Bag 03/04/20 1734     1,000 mg New Bag  0600     1,000 mg New Bag 03/03/20 1828     1,000 mg New Bag  0630     1,000 mg New Bag 03/02/20 1835     1,000 mg New Bag  0547     1,000 mg New Bag 03/01/20 1759                      Microbiologic Results:  Microbiology Results (last 7 days)       Procedure Component Value Units Date/Time    Blood culture [[id number]] Collected:  03/03/20 1925    Order Status:  Completed Specimen:  Blood Updated:  03/04/20 2103     Blood Culture, Routine No Growth to date      No Growth to date    Blood culture [[id number]] Collected:  03/03/20 1931    Order Status:  Completed Specimen:  Blood Updated:  03/04/20 2103     Blood Culture, Routine No Growth to date      No Growth to date    Blood culture [[id number]] Collected:  03/01/20 0835    Order Status:  Completed Specimen:  Blood Updated:  03/04/20 1103     Blood Culture, Routine No Growth to date      No Growth to date      No Growth to date      No Growth to date    Blood culture [[id number]] Collected:  03/01/20 0842    Order Status:  Completed Specimen:  Blood Updated:  03/04/20 1103     Blood Culture, Routine No Growth to date      No Growth to date      No Growth to date      No Growth to date    IV catheter culture [[id number]] Collected:  03/03/20 1951    Order Status:   Completed Specimen:  Catheter Tip, PICC Updated:  03/04/20 1051     Aerobic Culture - Cath tip No growth    Blood culture [[id number]] Collected:  02/23/20 1139    Order Status:  Completed Specimen:  Blood Updated:  02/28/20 1303     Blood Culture, Routine No growth after 5 days.    Blood culture [[id number]] Collected:  02/23/20 1140    Order Status:  Completed Specimen:  Blood Updated:  02/28/20 1303     Blood Culture, Routine No growth after 5 days.    Blood Culture #1 **CANNOT BE ORDERED STAT** [[id number]] Collected:  02/22/20 0540    Order Status:  Completed Specimen:  Blood from Line, Jugular, External Right Updated:  02/27/20 0703     Blood Culture, Routine No growth after 5 days.

## 2020-03-05 NOTE — PT/OT/SLP PROGRESS
Occupational Therapy      Patient Name:  Cali Mabry   MRN:  [id number]    Patient not seen today secondary to Unavailable (Pt off unit for EGD). Will follow-up as able.    LEONCIO Early  3/5/2020

## 2020-03-05 NOTE — UM SECONDARY REVIEW
[facility]    IP Extended Stay > 10    LOS: approved w/o recommendations due to severity of clinical picture   11 day LLOS approved by Dr. Gtz

## 2020-03-05 NOTE — SUBJECTIVE & OBJECTIVE
Past Medical History:   Diagnosis Date    Diabetes mellitus     Hepatitis     HTN (hypertension)     Pancreatitis        Past Surgical History:   Procedure Laterality Date    CARDIAC SURGERY  1999    stent placed. ([facility])    SHOULDER SURGERY  2013    right shoulder, spur removal.       Review of patient's allergies indicates:  No Known Allergies    No current facility-administered medications on file prior to encounter.      Current Outpatient Medications on File Prior to Encounter   Medication Sig    atorvastatin (LIPITOR) 40 MG tablet Take 40 mg by mouth once daily.    gabapentin (NEURONTIN) 300 MG capsule Take 300 mg by mouth.    ibuprofen (ADVIL,MOTRIN) 600 MG tablet Take 1 tablet (600 mg total) by mouth every 6 (six) hours as needed for Pain.    insulin aspart U-100 (NOVOLOG) 100 unit/mL (3 mL) InPn pen Inject 10 Units into the skin.    LANTUS 100 unit/mL injection Inject 15 Units into the skin 2 (two) times daily. (Patient taking differently: Inject 30 Units into the skin once daily. )    naloxone (NARCAN) 1 mg/mL injection 2 mg (1 mg per nostril) by Nasal route as needed for opioid overdose; may repeat in 3 to 5 minutes if not effective. Call 911    naloxone (NARCAN) 4 mg/actuation Spry 4mg by nasal route as needed for opioid overdose; may repeat every 2-3 minutes in alternating nostrils until medical help arrives. Call 911    nicotine (NICODERM CQ) 21 mg/24 hr Place 1 patch onto the skin once daily.    omeprazole (PRILOSEC) 40 MG capsule Take 1 capsule (40 mg total) by mouth once daily.     Family History     Problem Relation (Age of Onset)    Asthma Mother        Tobacco Use    Smoking status: Current Every Day Smoker     Packs/day: 1.00     Years: 20.00     Pack years: 20.00     Types: Cigarettes     Start date: 4/3/1981    Smokeless tobacco: Never Used   Substance and Sexual Activity    Alcohol use: No     Alcohol/week: 0.0 standard drinks    Drug use: Yes     Types: IV,  Marijuana, Heroin     Comment: currently, used today, herion    Sexual activity: Yes     Partners: Female     Review of Systems   Constitutional: Positive for activity change and appetite change.   HENT: Negative for congestion and dental problem.    Eyes: Negative for discharge and itching.   Respiratory: Positive for cough. Negative for apnea and chest tightness.    Cardiovascular: Negative for chest pain and leg swelling.   Gastrointestinal: Negative for abdominal pain, nausea and vomiting.   Endocrine: Negative for cold intolerance.   Genitourinary: Negative for difficulty urinating and dysuria.   Musculoskeletal: Negative for arthralgias and back pain.   Skin: Negative for color change and pallor.   Allergic/Immunologic: Negative for environmental allergies and food allergies.   Neurological: Negative for dizziness and facial asymmetry.   Hematological: Does not bruise/bleed easily.   Psychiatric/Behavioral: Negative for agitation.     Objective:     Vital Signs (Most Recent):  Temp: 97 °F (36.1 °C) (03/05/20 0627)  Pulse: 110 (03/05/20 0627)  Resp: (!) 26 (03/05/20 0627)  BP: (!) 155/75 (03/05/20 0627)  SpO2: 95 % (03/05/20 0627) Vital Signs (24h Range):  Temp:  [97 °F (36.1 °C)-99.5 °F (37.5 °C)] 97 °F (36.1 °C)  Pulse:  [] 110  Resp:  [17-26] 26  SpO2:  [92 %-97 %] 95 %  BP: (105-155)/(56-75) 155/75     Weight: 57.4 kg (126 lb 8.7 oz)  Body mass index is 19.24 kg/m².    Physical Exam   Constitutional: He is oriented to person, place, and time. No distress.   HENT:   Head: Atraumatic.   Eyes: Pupils are equal, round, and reactive to light. EOM are normal.   Neck: Normal range of motion. Neck supple.   Cardiovascular: Normal rate and regular rhythm.   Pulmonary/Chest: Effort normal.   Abdominal: Soft. He exhibits no distension.   Musculoskeletal: Normal range of motion. He exhibits no edema or deformity.   Neurological: He is oriented to person, place, and time. No cranial nerve deficit. Coordination  normal.   Skin: Skin is warm and dry.   Psychiatric: He has a normal mood and affect. His behavior is normal.         CRANIAL NERVES     CN III, IV, VI   Pupils are equal, round, and reactive to light.  Extraocular motions are normal.        Significant Labs:   CBC:   Recent Labs   Lab 03/03/20 1931 03/04/20 0726 03/05/20  0600   WBC 16.87* 15.02* 10.39   HGB 8.6* 9.0* 8.4*   HCT 27.6* 28.3* 27.6*   * 522* 517*     CMP:   Recent Labs   Lab 03/03/20 1931 03/04/20 0726 03/05/20  0600   * 131* 136   K 3.9 3.9 3.7   CL 96 95 98   CO2 30* 28 30*   GLU 67* 205* 105   BUN 12 8 11   CREATININE 1.0 0.9 0.9   CALCIUM 8.2* 8.0* 8.2*   PROT 7.4  --   --    ALBUMIN 1.6*  --   --    BILITOT 0.4  --   --    ALKPHOS 125  --   --    AST 18  --   --    ALT 14  --   --    ANIONGAP 7* 8 8   EGFRNONAA >60 >60 >60       Significant Imaging: reviewed.

## 2020-03-06 PROBLEM — K29.00 ACUTE GASTRITIS WITHOUT HEMORRHAGE: Status: ACTIVE | Noted: 2020-03-06

## 2020-03-06 LAB
ANION GAP SERPL CALC-SCNC: 4 MMOL/L (ref 8–16)
BACTERIA BLD CULT: NORMAL
BACTERIA BLD CULT: NORMAL
BACTERIA CATH TIP CULT: NO GROWTH
BASOPHILS # BLD AUTO: 0.01 K/UL (ref 0–0.2)
BASOPHILS NFR BLD: 0.1 % (ref 0–1.9)
BUN SERPL-MCNC: 9 MG/DL (ref 6–20)
CALCIUM SERPL-MCNC: 7.8 MG/DL (ref 8.7–10.5)
CHLORIDE SERPL-SCNC: 99 MMOL/L (ref 95–110)
CO2 SERPL-SCNC: 29 MMOL/L (ref 23–29)
CREAT SERPL-MCNC: 0.8 MG/DL (ref 0.5–1.4)
DIFFERENTIAL METHOD: ABNORMAL
EOSINOPHIL # BLD AUTO: 0 K/UL (ref 0–0.5)
EOSINOPHIL NFR BLD: 0.3 % (ref 0–8)
ERYTHROCYTE [DISTWIDTH] IN BLOOD BY AUTOMATED COUNT: 15.2 % (ref 11.5–14.5)
EST. GFR  (AFRICAN AMERICAN): >60 ML/MIN/1.73 M^2
EST. GFR  (NON AFRICAN AMERICAN): >60 ML/MIN/1.73 M^2
GLUCOSE SERPL-MCNC: 157 MG/DL (ref 70–110)
HCT VFR BLD AUTO: 26.4 % (ref 40–54)
HGB BLD-MCNC: 8.3 G/DL (ref 14–18)
IMM GRANULOCYTES # BLD AUTO: 0.05 K/UL (ref 0–0.04)
IMM GRANULOCYTES NFR BLD AUTO: 0.7 % (ref 0–0.5)
LYMPHOCYTES # BLD AUTO: 1.5 K/UL (ref 1–4.8)
LYMPHOCYTES NFR BLD: 19.6 % (ref 18–48)
MCH RBC QN AUTO: 25.9 PG (ref 27–31)
MCHC RBC AUTO-ENTMCNC: 31.4 G/DL (ref 32–36)
MCV RBC AUTO: 82 FL (ref 82–98)
MONOCYTES # BLD AUTO: 0.7 K/UL (ref 0.3–1)
MONOCYTES NFR BLD: 9.3 % (ref 4–15)
NEUTROPHILS # BLD AUTO: 5.3 K/UL (ref 1.8–7.7)
NEUTROPHILS NFR BLD: 70 % (ref 38–73)
NRBC BLD-RTO: 0 /100 WBC
PHOSPHATE SERPL-MCNC: 2.5 MG/DL (ref 2.7–4.5)
PLATELET # BLD AUTO: 548 K/UL (ref 150–350)
PMV BLD AUTO: 8.6 FL (ref 9.2–12.9)
POCT GLUCOSE: 230 MG/DL (ref 70–110)
POCT GLUCOSE: 238 MG/DL (ref 70–110)
POCT GLUCOSE: 304 MG/DL (ref 70–110)
POCT GLUCOSE: 382 MG/DL (ref 70–110)
POTASSIUM SERPL-SCNC: 3.9 MMOL/L (ref 3.5–5.1)
RBC # BLD AUTO: 3.21 M/UL (ref 4.6–6.2)
SODIUM SERPL-SCNC: 132 MMOL/L (ref 136–145)
WBC # BLD AUTO: 7.56 K/UL (ref 3.9–12.7)

## 2020-03-06 PROCEDURE — 63600175 PHARM REV CODE 636 W HCPCS: Performed by: HOSPITALIST

## 2020-03-06 PROCEDURE — 97110 THERAPEUTIC EXERCISES: CPT

## 2020-03-06 PROCEDURE — 84100 ASSAY OF PHOSPHORUS: CPT

## 2020-03-06 PROCEDURE — 25000003 PHARM REV CODE 250: Performed by: HOSPITALIST

## 2020-03-06 PROCEDURE — 85025 COMPLETE CBC W/AUTO DIFF WBC: CPT

## 2020-03-06 PROCEDURE — 80048 BASIC METABOLIC PNL TOTAL CA: CPT

## 2020-03-06 PROCEDURE — 97116 GAIT TRAINING THERAPY: CPT

## 2020-03-06 PROCEDURE — 11000001 HC ACUTE MED/SURG PRIVATE ROOM

## 2020-03-06 PROCEDURE — S4991 NICOTINE PATCH NONLEGEND: HCPCS | Performed by: HOSPITALIST

## 2020-03-06 PROCEDURE — 36415 COLL VENOUS BLD VENIPUNCTURE: CPT

## 2020-03-06 RX ADMIN — INSULIN ASPART 10 UNITS: 100 INJECTION, SOLUTION INTRAVENOUS; SUBCUTANEOUS at 06:03

## 2020-03-06 RX ADMIN — PIPERACILLIN AND TAZOBACTAM 4.5 G: 4; .5 INJECTION, POWDER, FOR SOLUTION INTRAVENOUS at 08:03

## 2020-03-06 RX ADMIN — INSULIN DETEMIR 10 UNITS: 100 INJECTION, SOLUTION SUBCUTANEOUS at 09:03

## 2020-03-06 RX ADMIN — GABAPENTIN 300 MG: 300 CAPSULE ORAL at 03:03

## 2020-03-06 RX ADMIN — PIPERACILLIN AND TAZOBACTAM 4.5 G: 4; .5 INJECTION, POWDER, FOR SOLUTION INTRAVENOUS at 03:03

## 2020-03-06 RX ADMIN — TRAMADOL HYDROCHLORIDE 50 MG: 50 TABLET, COATED ORAL at 01:03

## 2020-03-06 RX ADMIN — VANCOMYCIN HYDROCHLORIDE 1000 MG: 1 INJECTION, POWDER, LYOPHILIZED, FOR SOLUTION INTRAVENOUS at 05:03

## 2020-03-06 RX ADMIN — HYDROXYZINE PAMOATE 25 MG: 25 CAPSULE ORAL at 01:03

## 2020-03-06 RX ADMIN — TRAMADOL HYDROCHLORIDE 50 MG: 50 TABLET, COATED ORAL at 07:03

## 2020-03-06 RX ADMIN — GABAPENTIN 300 MG: 300 CAPSULE ORAL at 08:03

## 2020-03-06 RX ADMIN — PIPERACILLIN AND TAZOBACTAM 4.5 G: 4; .5 INJECTION, POWDER, FOR SOLUTION INTRAVENOUS at 11:03

## 2020-03-06 RX ADMIN — INSULIN ASPART 5 UNITS: 100 INJECTION, SOLUTION INTRAVENOUS; SUBCUTANEOUS at 06:03

## 2020-03-06 RX ADMIN — MUPIROCIN: 20 OINTMENT TOPICAL at 08:03

## 2020-03-06 RX ADMIN — INSULIN ASPART 2 UNITS: 100 INJECTION, SOLUTION INTRAVENOUS; SUBCUTANEOUS at 09:03

## 2020-03-06 RX ADMIN — FUROSEMIDE 40 MG: 40 TABLET ORAL at 08:03

## 2020-03-06 RX ADMIN — ENOXAPARIN SODIUM 40 MG: 100 INJECTION SUBCUTANEOUS at 06:03

## 2020-03-06 RX ADMIN — BENZONATATE 100 MG: 100 CAPSULE ORAL at 08:03

## 2020-03-06 RX ADMIN — POTASSIUM PHOSPHATE, MONOBASIC 500 MG: 500 TABLET, SOLUBLE ORAL at 08:03

## 2020-03-06 RX ADMIN — BENZONATATE 100 MG: 100 CAPSULE ORAL at 03:03

## 2020-03-06 RX ADMIN — ATORVASTATIN CALCIUM 40 MG: 40 TABLET, FILM COATED ORAL at 08:03

## 2020-03-06 RX ADMIN — Medication 6 MG: at 08:03

## 2020-03-06 RX ADMIN — HYDROXYZINE PAMOATE 25 MG: 25 CAPSULE ORAL at 09:03

## 2020-03-06 RX ADMIN — PIPERACILLIN AND TAZOBACTAM 4.5 G: 4; .5 INJECTION, POWDER, FOR SOLUTION INTRAVENOUS at 12:03

## 2020-03-06 RX ADMIN — TRAMADOL HYDROCHLORIDE 50 MG: 50 TABLET, COATED ORAL at 08:03

## 2020-03-06 RX ADMIN — DOCUSATE SODIUM - SENNOSIDES 1 TABLET: 50; 8.6 TABLET, FILM COATED ORAL at 08:03

## 2020-03-06 RX ADMIN — INSULIN ASPART 5 UNITS: 100 INJECTION, SOLUTION INTRAVENOUS; SUBCUTANEOUS at 02:03

## 2020-03-06 RX ADMIN — PANTOPRAZOLE SODIUM 40 MG: 40 TABLET, DELAYED RELEASE ORAL at 08:03

## 2020-03-06 RX ADMIN — VANCOMYCIN HYDROCHLORIDE 1000 MG: 1 INJECTION, POWDER, LYOPHILIZED, FOR SOLUTION INTRAVENOUS at 07:03

## 2020-03-06 RX ADMIN — INSULIN ASPART 5 UNITS: 100 INJECTION, SOLUTION INTRAVENOUS; SUBCUTANEOUS at 08:03

## 2020-03-06 RX ADMIN — HYDROXYZINE PAMOATE 25 MG: 25 CAPSULE ORAL at 05:03

## 2020-03-06 RX ADMIN — INSULIN DETEMIR 10 UNITS: 100 INJECTION, SOLUTION SUBCUTANEOUS at 08:03

## 2020-03-06 NOTE — PROGRESS NOTES
[facility] Hospital Medicine  Progress Note    Patient Name: Cali Mabry  MRN: [id number]  Patient Class: IP- Inpatient   Admission Date: 2/22/2020  Length of Stay: 13 days  Attending Physician: Kalyani Alberto MD  Primary Care Provider: Primary Doctor No        Subjective:     Principal Problem:Diabetic ketoacidosis without coma associated with diabetes mellitus due to underlying condition        HPI:  54-year-old male past medical history diabetes, hepatitis, hypertension history of polysubstance abuse, pancreatitis presents with abdominal pain for the last 2 days with nausea and vomiting home, and worsening body aches over the last day.  Patient reports he has not had his insulin in some time and reports his glucose is very high.  He states using IV heroin last use few days ago reportedly.  He reports coughing significant amount denies any phlegm production, denies any blood in his sputum, or any chest pain. Denies any blood in her stool, reports normal last bowel movement, reports he is urinating as normal and reports he is extremely thirsty and hungry.  Denies any fevers/chills, no extremity weakness/numbness/tingling.he is in DKA with blood sugar of 635,AG of 26 and CO 2 of 6,he has benen started on DKA protocol,RAMYA with CRT of 1.7,chest  Ray show also sign of pneumonia,patient has been started on broad spectrum IV Abx afterr bload culture is done,    Overview/Hospital Course:  54-year-old male past medical history diabetes, hepatitis, hypertension history of polysubstance abuse,IV heroin,chronic  pancreatitis presents with abdominal pain for the last 2 days with nausea and vomiting home, and worsening body aches over the last day.  Patient reports he has not had his insulin in some time and reports his glucose is very high.  He states using IV heroin last use few days ago reportedly.  He reports coughing significant amount denies any phlegm production, denies any blood in his  sputum, or any chest pain. Denies any blood in her stool, reports normal last bowel movement, reports he is urinating as normal and reports he is extremely thirsty and hungry.  Denies any fevers/chills, no extremity weakness/numbness/tingling.he is in DKA with blood sugar of 635,AG of 26 and CO 2 of 6,he has been  started on DKA protocol,RAMYA with CRT of 1.7,chest  ray show also sign of pneumonia,patient has been started on broad spectrum IV Abx afterr bload culture is done,  AG is closed,started on SQ insulin,started on diet.  Has bacteremia,staph aureus,likely from IV heroin use,alreday on Vanc.repeated blood culture,Echo to R/Oed out  endocarditis.  On Zosyn and azithromycin for pneumonia.  Repeat blood culture no growth,consulted SW for LTAC placement,he is IV drug user,can not go home.reperat blood culture show growth,will place picc line.  Afrberile,no place in LTAC yet.  Podiatry cleaned right heel callus,  Afebrile.  Require more oxygen supplement,chest X ray show possible aspiration,adjusted Abx,changed Rocephin to zosyn,added IS,aspiration precaution,consulted ST.on puree diet,oxygenation is improved,but he is septic with fever,leucocytiosis,tachycardia,tachypnea,likely duo to pneumonia.repeated blood culture.no growth,repeat chest x ray show some improvement.  Consulted PT,OT.  HH is drop today with no sign of bleeding,will transfuse pack of RBC.  Patient refused GI evaluation,EGD for esophogeal dysmotility.  He is again febrile,blood culture is repeated,picc line remove and send to culture,will follow up with cultures,  Will repeat chest X ray.  Has abnormal head CT ,no acute process,but Prominent ventricular system, may be greater than expected for generalized cerebral volume loss.  Normal pressure hydrocephalus is a consideration,consulted neurology.  CT chest show septic emboli,alreday on broad spectrum IV Abx,  Finally he is agree with EGD,show only gastritis,on PPI.    A/P,patient with history of IV  heroin abuse,is admitted for DKA, pneumonia,septic emboly,MRSA bacteremia from IV heroin use,on vancomycin until 3.8.20 also on zosyn for pneumonia,on puree diet for possible aspiration,EGD show only gastritis,  PT,OT recomnding SNF,but he want go home,does not want SNF,DC on Sunday if stable with PO Augmentin,chest X  ray can be repated in AM,order is in placed.      Past Medical History:   Diagnosis Date    Diabetes mellitus     Hepatitis     HTN (hypertension)     Pancreatitis        Past Surgical History:   Procedure Laterality Date    CARDIAC SURGERY  1999    stent placed. ([facility])    SHOULDER SURGERY  2013    right shoulder, spur removal.       Review of patient's allergies indicates:  No Known Allergies    No current facility-administered medications on file prior to encounter.      Current Outpatient Medications on File Prior to Encounter   Medication Sig    atorvastatin (LIPITOR) 40 MG tablet Take 40 mg by mouth once daily.    gabapentin (NEURONTIN) 300 MG capsule Take 300 mg by mouth.    ibuprofen (ADVIL,MOTRIN) 600 MG tablet Take 1 tablet (600 mg total) by mouth every 6 (six) hours as needed for Pain.    insulin aspart U-100 (NOVOLOG) 100 unit/mL (3 mL) InPn pen Inject 10 Units into the skin.    LANTUS 100 unit/mL injection Inject 15 Units into the skin 2 (two) times daily. (Patient taking differently: Inject 30 Units into the skin once daily. )    naloxone (NARCAN) 1 mg/mL injection 2 mg (1 mg per nostril) by Nasal route as needed for opioid overdose; may repeat in 3 to 5 minutes if not effective. Call 911    naloxone (NARCAN) 4 mg/actuation Spry 4mg by nasal route as needed for opioid overdose; may repeat every 2-3 minutes in alternating nostrils until medical help arrives. Call 911    nicotine (NICODERM CQ) 21 mg/24 hr Place 1 patch onto the skin once daily.    omeprazole (PRILOSEC) 40 MG capsule Take 1 capsule (40 mg total) by mouth once daily.     Family History     Problem  Relation (Age of Onset)    Asthma Mother        Tobacco Use    Smoking status: Current Every Day Smoker     Packs/day: 1.00     Years: 20.00     Pack years: 20.00     Types: Cigarettes     Start date: 4/3/1981    Smokeless tobacco: Never Used   Substance and Sexual Activity    Alcohol use: No     Alcohol/week: 0.0 standard drinks    Drug use: Yes     Types: IV, Marijuana, Heroin     Comment: currently, used today, herion    Sexual activity: Yes     Partners: Female     Review of Systems   Constitutional: Positive for activity change and appetite change.   HENT: Negative for congestion and dental problem.    Eyes: Negative for discharge and itching.   Respiratory: Positive for cough. Negative for apnea and chest tightness.    Cardiovascular: Negative for chest pain and leg swelling.   Gastrointestinal: Negative for abdominal pain, nausea and vomiting.   Endocrine: Negative for cold intolerance.   Genitourinary: Negative for difficulty urinating and dysuria.   Musculoskeletal: Negative for arthralgias and back pain.   Skin: Negative for color change and pallor.   Allergic/Immunologic: Negative for environmental allergies and food allergies.   Neurological: Negative for dizziness and facial asymmetry.   Hematological: Does not bruise/bleed easily.   Psychiatric/Behavioral: Negative for agitation.     Objective:     Vital Signs (Most Recent):  Temp: 98.4 °F (36.9 °C) (03/06/20 0514)  Pulse: 89 (03/06/20 0514)  Resp: 18 (03/06/20 0514)  BP: (!) 116/59 (03/06/20 0514)  SpO2: 95 % (03/06/20 0514) Vital Signs (24h Range):  Temp:  [98.4 °F (36.9 °C)-101 °F (38.3 °C)] 98.4 °F (36.9 °C)  Pulse:  [89-98] 89  Resp:  [18-22] 18  SpO2:  [93 %-99 %] 95 %  BP: (102-128)/(56-66) 116/59     Weight: 57.4 kg (126 lb 8.7 oz)  Body mass index is 19.24 kg/m².    Physical Exam   Constitutional: He is oriented to person, place, and time. No distress.   HENT:   Head: Atraumatic.   Eyes: Pupils are equal, round, and reactive to light. EOM  are normal.   Neck: Normal range of motion. Neck supple.   Cardiovascular: Normal rate and regular rhythm.   Pulmonary/Chest: Effort normal.   Abdominal: Soft. He exhibits no distension.   Musculoskeletal: Normal range of motion. He exhibits no edema or deformity.   Neurological: He is oriented to person, place, and time. No cranial nerve deficit. Coordination normal.   Skin: Skin is warm and dry.   Psychiatric: He has a normal mood and affect. His behavior is normal.         CRANIAL NERVES     CN III, IV, VI   Pupils are equal, round, and reactive to light.  Extraocular motions are normal.        Significant Labs:   CBC:   Recent Labs   Lab 03/05/20  0600 03/06/20  0510   WBC 10.39 7.56   HGB 8.4* 8.3*   HCT 27.6* 26.4*   * 548*     CMP:   Recent Labs   Lab 03/05/20 0600 03/06/20  0510    132*   K 3.7 3.9   CL 98 99   CO2 30* 29    157*   BUN 11 9   CREATININE 0.9 0.8   CALCIUM 8.2* 7.8*   ANIONGAP 8 4*   EGFRNONAA >60 >60       Significant Imaging: reviewed.      Assessment/Plan:      * Diabetic ketoacidosis without coma associated with diabetes mellitus due to underlying condition  DKA resolved  Now with hypoglycemia  Has received D50, patient eating well  If hypoglycemia persist, will start D51/2NS      Acute gastritis without hemorrhage  Per EGD,on PPI.      Encephalopathy, toxic    Has abnormal head CT ,no acute process,but Prominent ventricular system, may be greater than expected for generalized cerebral volume loss.  Normal pressure hydrocephalus is a consideration,consulted neurology.      Somnolence  As above,  Resolved,back to baseline.      Dysphagia    Patient initially refused GI evaluation,EGD for esophogeal dysmotility.      Finally he is agree with EGD,show only gastritis,      Sepsis   he is septic with fever,leucocytiosis,tachycardia,tachypnea,likely duo to pneumonia.repeated blood culture.on IV Abx,fever is improved,no growth in repeat blood culture.  He is again  febrile,blood culture is repeated,picc line remove and send to culture,will follow up with cultures,    CT chest show septic emboli,alreday on broad spectrum IV Abx,  Fever is resolved,leucocytosis is resolved,    Acute respiratory failure with hypoxia  Duo to pneumonia,Require more oxygen supplement,chest X ray show possible aspiration,adjusted Abx,chnaged Rocephin to zosyn,added IS,aspiration precaution,consulted ST.on puree diet,oxygenation is improving slowly.    Patient refused GI evaluation,EGD for esophogeal dysmotility.then accepted,done,has gastritis,.  Need RA oxygen be checked before DC.    MRSA bacteremia  Blood cultures (+) MRSA  Likely from IV heroin use  Continue Vancomycin  Blood cultures repeated  Echo performed: WNL  Repeat blood culture no growth,consulted [facility] for LTAC placement,he is IV drug user,can not go home.reperat blood culture show growth, placed picc line.    Afrberile,no place in LTAC yet.  Afebrile.        Pneumonia due to infectious organism  Per clinical presentation and imaging,will continue with IV Abx.no growth,repeat chest x ray show some improvement.      Chronic hepatitis C  Will monitor.      Anemia of chronic disease    HH is drop today with no sign of bleeding,will transfuse pack of RBC.      Intravenous drug abuse  Consult on IV heroin cessation use  Patient states that he quit 3-4 weeks ago      Hyperlipidemia  On statin.      Pancreatitis, chronic  Stable at this  time.      Essential hypertension  Stable at this time with no meds  Will monitor throughout hospital course    Type 2 diabetes mellitus, uncontrolled  Currently hypoglycemia  Will monitor blood glucose q.a.c. and HS  Will adjust regimen based on blood glucose trends        VTE Risk Mitigation (From admission, onward)         Ordered     enoxaparin injection 40 mg  Daily      02/22/20 1157     IP VTE HIGH RISK PATIENT  Once      02/22/20 1157     Place DAWIT hose  Until discontinued      02/22/20 1157                       Kalyani Alberto MD  Department of Hospital Medicine   [facility]

## 2020-03-06 NOTE — ASSESSMENT & PLAN NOTE
Patient initially refused GI evaluation,EGD for esophogeal dysmotility.      Finally he is agree with EGD,show only gastritis,

## 2020-03-06 NOTE — PT/OT/SLP PROGRESS
Physical Therapy Treatment    Patient Name:  Cali Mabry   MRN:  [id number]    Recommendations:     Discharge Recommendations:  home health PT(24hr supervision)   Discharge Equipment Recommendations: bedside commode, bath bench, shower chair   Barriers to discharge: 24hr sup recommended    Assessment:     Cali Mabry is a 54 y.o. male admitted with a medical diagnosis of Diabetic ketoacidosis without coma associated with diabetes mellitus due to underlying condition.  He presents with the following impairments/functional limitations:  weakness, impaired functional mobilty, decreased safety awareness, impaired coordination, gait instability, impaired endurance, decreased coordination, pain, impaired balance, impaired self care skills, decreased lower extremity function, decreased ROM Pt limited by pain and decreased motivation.    Rehab Prognosis: Fair; patient would benefit from acute skilled PT services to address these deficits and reach maximum level of function.    Recent Surgery: Procedure(s) (LRB):  EGD (ESOPHAGOGASTRODUODENOSCOPY) (N/A) 1 Day Post-Op    Plan:     During this hospitalization, patient to be seen (5-6x/wk) to address the identified rehab impairments via gait training, therapeutic activities, therapeutic exercises and progress toward the following goals:    · Plan of Care Expires:  03/15/20    Subjective     Chief Complaint: pain in B feet and calves during WB and ambulation  Patient/Family Comments/goals: to go home  Pain/Comfort:  · Pain Rating 1: (Not rated)  · Location 1: (feet and calves with ambulation)  · Pain Addressed 1: Reposition, Distraction, Cessation of Activity, Nurse notified      Objective:     Communicated with nsg prior to session.  Patient found HOB elevated with peripheral IV, telemetry(O2 via nasal cannula laying on bed) upon PT entry to room.     General Precautions: Standard, fall   Orthopedic Precautions:N/A   Braces: N/A     Functional Mobility:  · Bed  Mobility:     · Scooting: supervision  · Supine to Sit: supervision  · Sit to Supine: supervision  · Transfers:     · Sit to Stand:  contact guard assistance with rolling walker  · Gait: Gait training approx 10' with RW and CGA with VC/TC for walker management/safety and forward weight shift over TATI  · Balance: Fair + sit, Fair stand      AM-PAC 6 CLICK MOBILITY  Turning over in bed (including adjusting bedclothes, sheets and blankets)?: 4  Sitting down on and standing up from a chair with arms (e.g., wheelchair, bedside commode, etc.): 3  Moving from lying on back to sitting on the side of the bed?: 3  Moving to and from a bed to a chair (including a wheelchair)?: 3  Need to walk in hospital room?: 3  Climbing 3-5 steps with a railing?: 2  Basic Mobility Total Score: 18       Therapeutic Activities and Exercises:   Pt received B HCS/HSS 3x30 sec and educated how to perform self stretching.  B LE handout provided and reviewed with patient.  Pt verbalized/demonstrated understanding of B AP's, FAQ's, QS x 10 reps with max VC/TC to perform correctly and max encouragement    Patient left HOB elevated with all lines intact, call button in reach, bed alarm on and nsg notified..    GOALS:   Multidisciplinary Problems     Physical Therapy Goals        Problem: Physical Therapy Goal    Goal Priority Disciplines Outcome Goal Variances Interventions   Physical Therapy Goal     PT, PT/OT Ongoing, Progressing     Description:  Goals to be met by: 3/15/2020     Patient will increase functional independence with mobility by performin. Sit to stand transfer with Modified [place]  2. Gait  x 150 feet with Modified [place] with or without AD   3 Lower extremity exercise program x10 reps per handout, with independence                      Time Tracking:     PT Received On: 20  PT Start Time: 1142     PT Stop Time: 1214  PT Total Time (min): 32 min     Billable Minutes: Gait Training 15 and Therapeutic Exercise  17    Treatment Type: Treatment  PT/PTA: PT     PTA Visit Number: 0     Eri Vasquez, PT  03/06/2020

## 2020-03-06 NOTE — PROGRESS NOTES
The patient denies having any more dysphagia.  He has been wearing his dentures.    Vitals:    03/05/20 1527 03/05/20 2107 03/06/20 0100 03/06/20 0514   BP: (!) 102/56 128/62 (!) 111/57 (!) 116/59   BP Location: Left arm Left arm Left arm Left arm   Patient Position: Lying Lying Lying Lying   Pulse: 93 95 91 89   Resp: (!) 22 20 20 18   Temp: 98.5 °F (36.9 °C) 99.8 °F (37.7 °C) 98.7 °F (37.1 °C) 98.4 °F (36.9 °C)   TempSrc: Oral Oral Oral Oral   SpO2: (!) 93% 98% (!) 94% 95%   Weight:       Height:          atorvastatin  40 mg Oral Daily    benzonatate  100 mg Oral TID    enoxaparin  40 mg Subcutaneous Daily    furosemide  40 mg Oral Daily    gabapentin  300 mg Oral TID    hydrOXYzine pamoate  25 mg Oral Q8H    insulin aspart U-100  5 Units Subcutaneous TIDWM    insulin detemir U-100  10 Units Subcutaneous BID    mupirocin   Nasal BID    nicotine  1 patch Transdermal Daily    pantoprazole  40 mg Oral Daily    piperacillin-tazobactam (ZOSYN) IVPB  4.5 g Intravenous Q8H    potassium phosphate (monobasic)  500 mg Oral BID    senna-docusate 8.6-50 mg  1 tablet Oral BID    vancomycin (VANCOCIN) IVPB  1,000 mg Intravenous Q12H     P.E.:  GEN: A x O x 3, NAD  HEENT: EOMI, PERRL, anicteric sclera  CV: RRR, no M/R/G  Chest: CTA B  Abdomen: soft, NTND, normoactive BS  Ext: No C/C/E. 2+ dorsalis pedis pulses B    Labs:  Recent Results (from the past 336 hour(s))   CBC auto differential    Collection Time: 03/06/20  5:10 AM   Result Value Ref Range    WBC 7.56 3.90 - 12.70 K/uL    Hemoglobin 8.3 (L) 14.0 - 18.0 g/dL    Hematocrit 26.4 (L) 40.0 - 54.0 %    Platelets 548 (H) 150 - 350 K/uL   CBC auto differential    Collection Time: 03/05/20  6:00 AM   Result Value Ref Range    WBC 10.39 3.90 - 12.70 K/uL    Hemoglobin 8.4 (L) 14.0 - 18.0 g/dL    Hematocrit 27.6 (L) 40.0 - 54.0 %    Platelets 517 (H) 150 - 350 K/uL   CBC auto differential    Collection Time: 03/04/20  7:26 AM   Result Value Ref Range    WBC 15.02  (H) 3.90 - 12.70 K/uL    Hemoglobin 9.0 (L) 14.0 - 18.0 g/dL    Hematocrit 28.3 (L) 40.0 - 54.0 %    Platelets 522 (H) 150 - 350 K/uL     CMP  Sodium   Date Value Ref Range Status   03/06/2020 132 (L) 136 - 145 mmol/L Final     Potassium   Date Value Ref Range Status   03/06/2020 3.9 3.5 - 5.1 mmol/L Final     Chloride   Date Value Ref Range Status   03/06/2020 99 95 - 110 mmol/L Final     CO2   Date Value Ref Range Status   03/06/2020 29 23 - 29 mmol/L Final     Glucose   Date Value Ref Range Status   03/06/2020 157 (H) 70 - 110 mg/dL Final     BUN, Bld   Date Value Ref Range Status   03/06/2020 9 6 - 20 mg/dL Final     Creatinine   Date Value Ref Range Status   03/06/2020 0.8 0.5 - 1.4 mg/dL Final     Calcium   Date Value Ref Range Status   03/06/2020 7.8 (L) 8.7 - 10.5 mg/dL Final     Total Protein   Date Value Ref Range Status   03/03/2020 7.4 6.0 - 8.4 g/dL Final     Albumin   Date Value Ref Range Status   03/03/2020 1.6 (L) 3.5 - 5.2 g/dL Final     Total Bilirubin   Date Value Ref Range Status   03/03/2020 0.4 0.1 - 1.0 mg/dL Final     Comment:     For infants and newborns, interpretation of results should be based  on gestational age, weight and in agreement with clinical  observations.  Premature Infant recommended reference ranges:  Up to 24 hours.............<8.0 mg/dL  Up to 48 hours............<12.0 mg/dL  3-5 days..................<15.0 mg/dL  6-29 days.................<15.0 mg/dL       Alkaline Phosphatase   Date Value Ref Range Status   03/03/2020 125 55 - 135 U/L Final     AST   Date Value Ref Range Status   03/03/2020 18 10 - 40 U/L Final     ALT   Date Value Ref Range Status   03/03/2020 14 10 - 44 U/L Final     Anion Gap   Date Value Ref Range Status   03/06/2020 4 (L) 8 - 16 mmol/L Final     eGFR if    Date Value Ref Range Status   03/06/2020 >60 >60 mL/min/1.73 m^2 Final     eGFR if non    Date Value Ref Range Status   03/06/2020 >60 >60 mL/min/1.73 m^2 Final      Comment:     Calculation used to obtain the estimated glomerular filtration  rate (eGFR) is the CKD-EPI equation.          No results for input(s): PT, INR, APTT in the last 24 hours.    A/P:  The patient is a 54 year old man with a history of HTN, DM, and hepatitis C and alcoholic pancreatitis presenting with diabetic ketoacidosis, MRSA bacteremia, a pneumonia, and possible dysphagia.    1. Dysphagia - he occasionally has solid food dysphagia and he does not always wear his dentures.  An EGD sh owed a small hiatal hernia and gastritis s/p biopsies.  Pathology is pending.  NSAIDs should be avoided and protonix 40 mg po daily can be continued for one month.  He is not talkative, but he states he no longer has dysphagia.  He was encouraged to wear his dentures when eating.  Gastroenterology has nothing additional to add, but will be available with any further issues.

## 2020-03-06 NOTE — PT/OT/SLP PROGRESS
Speech Language Pathology      Cali Mabry  MRN: [id number]    Patient not seen today secondary to Patient unwilling to participate. ST will follow-up.     Emerita Michaud, CF-SLP

## 2020-03-06 NOTE — PLAN OF CARE
In to speak with pt to update on discharge plan; previously noted to transfer to SNF which pt is now refusing; POC updated to D/C to home with Home Health; per MD notes possible discharge Sunday; pt is in agreement to Home Health; stated that he does not have a preference in company and is agreeable to which ever agency accepts and approves him;     Call placed to [facility] to inform of change in discharge plan; spoke with Shwetha    Inquired about PCP to schedule follow up appt pt asked that I speak with his mother who knows the name of his doctor; [place] to revisit at later time to interview mother for information    Denies further discharge needs at this time       03/06/20 1451   Discharge Reassessment   Assessment Type Discharge Planning Reassessment   Provided patient/caregiver education on the expected discharge date and the discharge plan Yes   Do you have any problems affording any of your prescribed medications? No   Discharge Plan A Home Health   Discharge Plan B Home   DME Needed Upon Discharge  none   Anticipated Discharge Disposition Home-Health   Can the patient/caregiver answer the patient profile reliably? Yes, cognitively intact   How does the patient rate their overall health at the present time? Poor   Describe the patient's ability to walk at the present time. Minor restrictions or changes   How often would a person be available to care for the patient? Often   Number of comorbid conditions (as recorded on the chart) Three   During the past month, has the patient often been bothered by feeling down, depressed or hopeless? No   During the past month, has the patient often been bothered by little interest or pleasure in doing things? No   Post-Acute Status   Post-Acute Authorization Home Health/Hospice   Post-Acute Placement Status Pending Payor Review   Home Health/Hospice Status Awaiting Orders for HH   Other Status See Comments  (Home Health)   Discharge Delays None known at this time

## 2020-03-06 NOTE — PLAN OF CARE
Problem: Physical Therapy Goal  Goal: Physical Therapy Goal  Description  Goals to be met by: 3/15/2020     Patient will increase functional independence with mobility by performin. Sit to stand transfer with Modified [place]  2. Gait  x 150 feet with Modified [place] with or without AD   3 Lower extremity exercise program x10 reps per handout, with independence     Outcome: Ongoing, Progressing   Pt limited by pain and decreased motivation.  Continue with POC as able.  Pt ok for D/C home with 24hr supervision, HHPT, RW, BSC

## 2020-03-06 NOTE — SUBJECTIVE & OBJECTIVE
Past Medical History:   Diagnosis Date    Diabetes mellitus     Hepatitis     HTN (hypertension)     Pancreatitis        Past Surgical History:   Procedure Laterality Date    CARDIAC SURGERY  1999    stent placed. ([facility])    SHOULDER SURGERY  2013    right shoulder, spur removal.       Review of patient's allergies indicates:  No Known Allergies    No current facility-administered medications on file prior to encounter.      Current Outpatient Medications on File Prior to Encounter   Medication Sig    atorvastatin (LIPITOR) 40 MG tablet Take 40 mg by mouth once daily.    gabapentin (NEURONTIN) 300 MG capsule Take 300 mg by mouth.    ibuprofen (ADVIL,MOTRIN) 600 MG tablet Take 1 tablet (600 mg total) by mouth every 6 (six) hours as needed for Pain.    insulin aspart U-100 (NOVOLOG) 100 unit/mL (3 mL) InPn pen Inject 10 Units into the skin.    LANTUS 100 unit/mL injection Inject 15 Units into the skin 2 (two) times daily. (Patient taking differently: Inject 30 Units into the skin once daily. )    naloxone (NARCAN) 1 mg/mL injection 2 mg (1 mg per nostril) by Nasal route as needed for opioid overdose; may repeat in 3 to 5 minutes if not effective. Call 911    naloxone (NARCAN) 4 mg/actuation Spry 4mg by nasal route as needed for opioid overdose; may repeat every 2-3 minutes in alternating nostrils until medical help arrives. Call 911    nicotine (NICODERM CQ) 21 mg/24 hr Place 1 patch onto the skin once daily.    omeprazole (PRILOSEC) 40 MG capsule Take 1 capsule (40 mg total) by mouth once daily.     Family History     Problem Relation (Age of Onset)    Asthma Mother        Tobacco Use    Smoking status: Current Every Day Smoker     Packs/day: 1.00     Years: 20.00     Pack years: 20.00     Types: Cigarettes     Start date: 4/3/1981    Smokeless tobacco: Never Used   Substance and Sexual Activity    Alcohol use: No     Alcohol/week: 0.0 standard drinks    Drug use: Yes     Types: IV,  Marijuana, Heroin     Comment: currently, used today, herion    Sexual activity: Yes     Partners: Female     Review of Systems   Constitutional: Positive for activity change and appetite change.   HENT: Negative for congestion and dental problem.    Eyes: Negative for discharge and itching.   Respiratory: Positive for cough. Negative for apnea and chest tightness.    Cardiovascular: Negative for chest pain and leg swelling.   Gastrointestinal: Negative for abdominal pain, nausea and vomiting.   Endocrine: Negative for cold intolerance.   Genitourinary: Negative for difficulty urinating and dysuria.   Musculoskeletal: Negative for arthralgias and back pain.   Skin: Negative for color change and pallor.   Allergic/Immunologic: Negative for environmental allergies and food allergies.   Neurological: Negative for dizziness and facial asymmetry.   Hematological: Does not bruise/bleed easily.   Psychiatric/Behavioral: Negative for agitation.     Objective:     Vital Signs (Most Recent):  Temp: 98.4 °F (36.9 °C) (03/06/20 0514)  Pulse: 89 (03/06/20 0514)  Resp: 18 (03/06/20 0514)  BP: (!) 116/59 (03/06/20 0514)  SpO2: 95 % (03/06/20 0514) Vital Signs (24h Range):  Temp:  [98.4 °F (36.9 °C)-101 °F (38.3 °C)] 98.4 °F (36.9 °C)  Pulse:  [89-98] 89  Resp:  [18-22] 18  SpO2:  [93 %-99 %] 95 %  BP: (102-128)/(56-66) 116/59     Weight: 57.4 kg (126 lb 8.7 oz)  Body mass index is 19.24 kg/m².    Physical Exam   Constitutional: He is oriented to person, place, and time. No distress.   HENT:   Head: Atraumatic.   Eyes: Pupils are equal, round, and reactive to light. EOM are normal.   Neck: Normal range of motion. Neck supple.   Cardiovascular: Normal rate and regular rhythm.   Pulmonary/Chest: Effort normal.   Abdominal: Soft. He exhibits no distension.   Musculoskeletal: Normal range of motion. He exhibits no edema or deformity.   Neurological: He is oriented to person, place, and time. No cranial nerve deficit. Coordination  normal.   Skin: Skin is warm and dry.   Psychiatric: He has a normal mood and affect. His behavior is normal.         CRANIAL NERVES     CN III, IV, VI   Pupils are equal, round, and reactive to light.  Extraocular motions are normal.        Significant Labs:   CBC:   Recent Labs   Lab 03/05/20  0600 03/06/20  0510   WBC 10.39 7.56   HGB 8.4* 8.3*   HCT 27.6* 26.4*   * 548*     CMP:   Recent Labs   Lab 03/05/20  0600 03/06/20  0510    132*   K 3.7 3.9   CL 98 99   CO2 30* 29    157*   BUN 11 9   CREATININE 0.9 0.8   CALCIUM 8.2* 7.8*   ANIONGAP 8 4*   EGFRNONAA >60 >60       Significant Imaging: reviewed.

## 2020-03-06 NOTE — ANESTHESIA POSTPROCEDURE EVALUATION
Anesthesia Post Evaluation    Patient: Cali Mabry    Procedure(s) Performed: Procedure(s) (LRB):  EGD (ESOPHAGOGASTRODUODENOSCOPY) (N/A)    Final Anesthesia Type: general    Patient location during evaluation: GI PACU  Patient participation: Yes- Able to Participate  Level of consciousness: awake and alert and oriented  Post-procedure vital signs: reviewed and stable  Pain management: adequate  Airway patency: patent    PONV status at discharge: No PONV  Anesthetic complications: no      Cardiovascular status: hemodynamically stable and blood pressure returned to baseline  Respiratory status: spontaneous ventilation, room air and unassisted  Hydration status: euvolemic  Follow-up not needed.          Vitals Value Taken Time   /59 3/6/2020  5:14 AM   Temp 36.9 °C (98.4 °F) 3/6/2020  5:14 AM   Pulse 89 3/6/2020  5:14 AM   Resp 18 3/6/2020  5:14 AM   SpO2 95 % 3/6/2020  5:14 AM         Event Time     Out of Recovery 03/05/2020 15:27:00          Pain/John Score: Pain Rating Prior to Med Admin: 8 (3/6/2020  8:41 AM)  Pain Rating Post Med Admin: 3 (3/5/2020 10:57 PM)  John Score: 9 (3/5/2020  3:27 PM)  Modified John Score: 19 (3/5/2020  3:12 PM)

## 2020-03-06 NOTE — ANESTHESIA POSTPROCEDURE EVALUATION
Anesthesia Post Evaluation    Patient: Cali Mabry    Procedure(s) Performed: Procedure(s) (LRB):  EGD (ESOPHAGOGASTRODUODENOSCOPY) (N/A)    [facility] Anesthesia Post Op Evaluation      Vitals Value Taken Time   /59 3/6/2020  5:14 AM   Temp 36.9 °C (98.4 °F) 3/6/2020  5:14 AM   Pulse 89 3/6/2020  5:14 AM   Resp 18 3/6/2020  5:14 AM   SpO2 95 % 3/6/2020  5:14 AM         Event Time     Out of Recovery 03/05/2020 15:27:00          Pain/John Score: Pain Rating Prior to Med Admin: 8 (3/6/2020  8:41 AM)  Pain Rating Post Med Admin: 3 (3/5/2020 10:57 PM)  John Score: 9 (3/5/2020  3:27 PM)  Modified John Score: 19 (3/5/2020  3:12 PM)

## 2020-03-06 NOTE — PLAN OF CARE
03/06/20 1450   Medicare Message   Important Message from Medicare regarding Discharge Appeal Rights Given to patient/caregiver;Explained to patient/caregiver;Signed/date by patient/caregiver   JAVED Message   Date JAVED was signed 03/06/20   Time JAVED was signed 1440

## 2020-03-06 NOTE — NURSING
Patient is alert and oriented x 4.  Patient medicated twice through out shift for generalized discomfort.  Call light within reach.  Patient NPO since midnight.  IV antibiotic given.  Bed in low and locked position.  Patient refused 12:00 a.m. and 04:00 a.m vitals signs.  Patient pushed away Sree (PCT) hand when trying to check vitals.  Nurse redirected the patient by educating him to use his words.  Patient was apologetic.

## 2020-03-06 NOTE — PLAN OF CARE
Patient is alert and oriented x 4.  Patient medicated once throughout shift for abdomen discomfort.  Patient continues on contact precautions for positive blood cultures.  Call light with in reach.  IV antibiotics continues.          Problem: Adult Inpatient Plan of Care  Goal: Plan of Care Review  Outcome: Ongoing, Progressing  Flowsheets (Taken 3/6/2020 0244)  Plan of Care Reviewed With: patient  Goal: Patient-Specific Goal (Individualization)  Outcome: Ongoing, Progressing  Goal: Absence of Hospital-Acquired Illness or Injury  Outcome: Ongoing, Progressing  Goal: Optimal Comfort and Wellbeing  Outcome: Ongoing, Progressing  Goal: Readiness for Transition of Care  Outcome: Ongoing, Progressing  Goal: Rounds/Family Conference  Outcome: Ongoing, Progressing     Problem: Diabetes Comorbidity  Goal: Blood Glucose Level Within Desired Range  Outcome: Ongoing, Progressing     Problem: Oral Intake Inadequate  Goal: Improved Oral Intake  Outcome: Ongoing, Progressing  Intervention: Promote and Optimize Oral Intake  Flowsheets (Taken 3/6/2020 0244)  Oral Nutrition Promotion: medicated     Problem: Skin Injury Risk Increased  Goal: Skin Health and Integrity  Outcome: Ongoing, Progressing  Intervention: Promote and Optimize Oral Intake  Flowsheets (Taken 3/6/2020 0244)  Oral Nutrition Promotion: medicated     Problem: Fall Injury Risk  Goal: Absence of Fall and Fall-Related Injury  Outcome: Ongoing, Progressing     Problem: Infection  Goal: Infection Symptom Resolution  Outcome: Ongoing, Progressing

## 2020-03-06 NOTE — ASSESSMENT & PLAN NOTE
Duo to pneumonia,Require more oxygen supplement,chest X ray show possible aspiration,adjusted Abx,chnaged Rocephin to zosyn,added IS,aspiration precaution,consulted ST.on puree diet,oxygenation is improving slowly.    Patient refused GI evaluation,EGD for esophogeal dysmotility.then accepted,done,has gastritis,.  Need RA oxygen be checked before DC.

## 2020-03-07 LAB
ANION GAP SERPL CALC-SCNC: 11 MMOL/L (ref 8–16)
BASOPHILS # BLD AUTO: 0.02 K/UL (ref 0–0.2)
BASOPHILS NFR BLD: 0.3 % (ref 0–1.9)
BUN SERPL-MCNC: 9 MG/DL (ref 6–20)
CALCIUM SERPL-MCNC: 8.4 MG/DL (ref 8.7–10.5)
CHLORIDE SERPL-SCNC: 95 MMOL/L (ref 95–110)
CO2 SERPL-SCNC: 25 MMOL/L (ref 23–29)
CREAT SERPL-MCNC: 0.9 MG/DL (ref 0.5–1.4)
DIFFERENTIAL METHOD: ABNORMAL
EOSINOPHIL # BLD AUTO: 0 K/UL (ref 0–0.5)
EOSINOPHIL NFR BLD: 0.5 % (ref 0–8)
ERYTHROCYTE [DISTWIDTH] IN BLOOD BY AUTOMATED COUNT: 15 % (ref 11.5–14.5)
EST. GFR  (AFRICAN AMERICAN): >60 ML/MIN/1.73 M^2
EST. GFR  (NON AFRICAN AMERICAN): >60 ML/MIN/1.73 M^2
GLUCOSE SERPL-MCNC: 233 MG/DL (ref 70–110)
HCT VFR BLD AUTO: 28 % (ref 40–54)
HGB BLD-MCNC: 8.4 G/DL (ref 14–18)
IMM GRANULOCYTES # BLD AUTO: 0.04 K/UL (ref 0–0.04)
IMM GRANULOCYTES NFR BLD AUTO: 0.7 % (ref 0–0.5)
LYMPHOCYTES # BLD AUTO: 1.4 K/UL (ref 1–4.8)
LYMPHOCYTES NFR BLD: 24.1 % (ref 18–48)
MCH RBC QN AUTO: 25.7 PG (ref 27–31)
MCHC RBC AUTO-ENTMCNC: 30 G/DL (ref 32–36)
MCV RBC AUTO: 86 FL (ref 82–98)
MONOCYTES # BLD AUTO: 0.6 K/UL (ref 0.3–1)
MONOCYTES NFR BLD: 10.3 % (ref 4–15)
NEUTROPHILS # BLD AUTO: 3.7 K/UL (ref 1.8–7.7)
NEUTROPHILS NFR BLD: 64.1 % (ref 38–73)
NRBC BLD-RTO: 0 /100 WBC
PHOSPHATE SERPL-MCNC: 3.1 MG/DL (ref 2.7–4.5)
PLATELET # BLD AUTO: 632 K/UL (ref 150–350)
PMV BLD AUTO: 8.8 FL (ref 9.2–12.9)
POCT GLUCOSE: 153 MG/DL (ref 70–110)
POCT GLUCOSE: 210 MG/DL (ref 70–110)
POCT GLUCOSE: 213 MG/DL (ref 70–110)
POCT GLUCOSE: 347 MG/DL (ref 70–110)
POTASSIUM SERPL-SCNC: 4.1 MMOL/L (ref 3.5–5.1)
RBC # BLD AUTO: 3.27 M/UL (ref 4.6–6.2)
SODIUM SERPL-SCNC: 131 MMOL/L (ref 136–145)
WBC # BLD AUTO: 5.73 K/UL (ref 3.9–12.7)

## 2020-03-07 PROCEDURE — 63600175 PHARM REV CODE 636 W HCPCS: Performed by: HOSPITALIST

## 2020-03-07 PROCEDURE — 85025 COMPLETE CBC W/AUTO DIFF WBC: CPT

## 2020-03-07 PROCEDURE — 92526 ORAL FUNCTION THERAPY: CPT

## 2020-03-07 PROCEDURE — 25000003 PHARM REV CODE 250: Performed by: HOSPITALIST

## 2020-03-07 PROCEDURE — 11000001 HC ACUTE MED/SURG PRIVATE ROOM

## 2020-03-07 PROCEDURE — 36415 COLL VENOUS BLD VENIPUNCTURE: CPT

## 2020-03-07 PROCEDURE — 84100 ASSAY OF PHOSPHORUS: CPT

## 2020-03-07 PROCEDURE — 80048 BASIC METABOLIC PNL TOTAL CA: CPT

## 2020-03-07 RX ORDER — VANCOMYCIN HCL IN 5 % DEXTROSE 1G/250ML
1000 PLASTIC BAG, INJECTION (ML) INTRAVENOUS
Status: DISCONTINUED | OUTPATIENT
Start: 2020-03-08 | End: 2020-03-08 | Stop reason: HOSPADM

## 2020-03-07 RX ADMIN — DOCUSATE SODIUM - SENNOSIDES 1 TABLET: 50; 8.6 TABLET, FILM COATED ORAL at 08:03

## 2020-03-07 RX ADMIN — GABAPENTIN 300 MG: 300 CAPSULE ORAL at 02:03

## 2020-03-07 RX ADMIN — HYDROXYZINE PAMOATE 25 MG: 25 CAPSULE ORAL at 05:03

## 2020-03-07 RX ADMIN — INSULIN ASPART 5 UNITS: 100 INJECTION, SOLUTION INTRAVENOUS; SUBCUTANEOUS at 05:03

## 2020-03-07 RX ADMIN — POTASSIUM PHOSPHATE, MONOBASIC 500 MG: 500 TABLET, SOLUBLE ORAL at 08:03

## 2020-03-07 RX ADMIN — INSULIN DETEMIR 10 UNITS: 100 INJECTION, SOLUTION SUBCUTANEOUS at 09:03

## 2020-03-07 RX ADMIN — ENOXAPARIN SODIUM 40 MG: 100 INJECTION SUBCUTANEOUS at 05:03

## 2020-03-07 RX ADMIN — BENZONATATE 100 MG: 100 CAPSULE ORAL at 02:03

## 2020-03-07 RX ADMIN — GABAPENTIN 300 MG: 300 CAPSULE ORAL at 08:03

## 2020-03-07 RX ADMIN — PANTOPRAZOLE SODIUM 40 MG: 40 TABLET, DELAYED RELEASE ORAL at 08:03

## 2020-03-07 RX ADMIN — TRAMADOL HYDROCHLORIDE 50 MG: 50 TABLET, COATED ORAL at 05:03

## 2020-03-07 RX ADMIN — BENZONATATE 100 MG: 100 CAPSULE ORAL at 08:03

## 2020-03-07 RX ADMIN — ATORVASTATIN CALCIUM 40 MG: 40 TABLET, FILM COATED ORAL at 08:03

## 2020-03-07 RX ADMIN — HYDROXYZINE PAMOATE 25 MG: 25 CAPSULE ORAL at 09:03

## 2020-03-07 RX ADMIN — INSULIN DETEMIR 10 UNITS: 100 INJECTION, SOLUTION SUBCUTANEOUS at 08:03

## 2020-03-07 RX ADMIN — INSULIN ASPART 4 UNITS: 100 INJECTION, SOLUTION INTRAVENOUS; SUBCUTANEOUS at 12:03

## 2020-03-07 RX ADMIN — INSULIN ASPART 4 UNITS: 100 INJECTION, SOLUTION INTRAVENOUS; SUBCUTANEOUS at 05:03

## 2020-03-07 RX ADMIN — INSULIN ASPART 5 UNITS: 100 INJECTION, SOLUTION INTRAVENOUS; SUBCUTANEOUS at 08:03

## 2020-03-07 RX ADMIN — FUROSEMIDE 40 MG: 40 TABLET ORAL at 08:03

## 2020-03-07 RX ADMIN — INSULIN ASPART 5 UNITS: 100 INJECTION, SOLUTION INTRAVENOUS; SUBCUTANEOUS at 12:03

## 2020-03-07 RX ADMIN — TRAMADOL HYDROCHLORIDE 50 MG: 50 TABLET, COATED ORAL at 07:03

## 2020-03-07 RX ADMIN — HYDROXYZINE PAMOATE 25 MG: 25 CAPSULE ORAL at 02:03

## 2020-03-07 RX ADMIN — INSULIN ASPART 8 UNITS: 100 INJECTION, SOLUTION INTRAVENOUS; SUBCUTANEOUS at 08:03

## 2020-03-07 RX ADMIN — INSULIN ASPART 1 UNITS: 100 INJECTION, SOLUTION INTRAVENOUS; SUBCUTANEOUS at 10:03

## 2020-03-07 RX ADMIN — PIPERACILLIN AND TAZOBACTAM 4.5 G: 4; .5 INJECTION, POWDER, FOR SOLUTION INTRAVENOUS at 05:03

## 2020-03-07 RX ADMIN — MUPIROCIN: 20 OINTMENT TOPICAL at 08:03

## 2020-03-07 RX ADMIN — PIPERACILLIN AND TAZOBACTAM 4.5 G: 4; .5 INJECTION, POWDER, FOR SOLUTION INTRAVENOUS at 08:03

## 2020-03-07 RX ADMIN — VANCOMYCIN HYDROCHLORIDE 1000 MG: 1 INJECTION, POWDER, LYOPHILIZED, FOR SOLUTION INTRAVENOUS at 05:03

## 2020-03-07 RX ADMIN — Medication 6 MG: at 08:03

## 2020-03-07 NOTE — NURSING
Home Oxygen Evaluation    Date Performed: 3/7/2020    1) Patient's Home O2 Sat on room air, while at rest: 96%        If O2 sats on room air at rest are 88% or below, patient qualifies. No additional testing needed. Document N/A in steps 2 and 3. If 89% or above, complete steps 2.      2) Patient's O2 Sat on room air while exercisin%        If O2 sats on room air while exercising remain 89% or above patient does not qualify, no further testing needed Document N/A in step 3. If O2 sats on room air while exercising are 88% or below, continue to step 3.      3) N/A         (Must show improvement from #2 for patients to qualify)    If O2 sats improve on oxygen, patient qualifies for portable oxygen. If not, the patient does not qualify.

## 2020-03-07 NOTE — PT/OT/SLP PROGRESS
Speech Language Pathology Treatment    Patient Name:  Cali Mabry   MRN:  [id number]  Admitting Diagnosis: Diabetic ketoacidosis without coma associated with diabetes mellitus due to underlying condition    Recommendations:                 General Recommendations:  Dysphagia therapy  Diet recommendations:  Mechanical soft, Liquid Diet Level: Thin   Aspiration Precautions: wear upper dentures during meal, 1 bite/sip at a time, Alternating bites/sips and Small bites/sips   General Precautions: Standard, contact, fall, other (see comments)(mechanical soft)  Communication strategies:  none    Subjective     Pt upright at bedside eating lunch. He consumed 75% of meal, however, he did complain about the consistency of the food.     Objective:     Has the patient been evaluated by SLP for swallowing?   Yes  Keep patient NPO? No   Current Respiratory Status: nasal cannula      ST attempted assessment of pt with trials of soft solid item for diet upgrade. ST explained the purpose of today's treatment. Pt was agreeable, ST provided assist with oral hygiene and denture placement. Pt began to cough persistently and stated that he was unable to perform denture placement at this time and did not wish to continue soft solid trials. ST rec that pt continue puree diet and thin liquids at this time. Will attempt re-assessment.      Assessment:     Cali Mabry is a 54 y.o. male with a dx of Diabetic ketoacidosis without coma associated with diabetes mellitus due to underlying condition Pt  presents with functional oral mech and pharyngeal phases of swallow at bedside. Rec: advance diet to mechanical soft per pt's request.     Goals:   Multidisciplinary Problems     SLP Goals        Problem: SLP Goal    Goal Priority Disciplines Outcome   SLP Goal     SLP Ongoing, Not Progressing   Description:  1. Pt will tolerate puree diet and TL without overt s/s of aspiration.   2. Pt will demonstrate efficient A-P transport/deglutition  of soft solid item x3 sessions.                     Plan:     · Patient to be seen:  3 x/week   · Plan of Care expires:  03/13/20  · Plan of Care reviewed with:  patient, other (see comments)(niece)   · SLP Follow-Up:  Yes       Discharge recommendations:  (TBD)   Barriers to Discharge:  None    Time Tracking:     SLP Treatment Date:   03/07/20  Speech Start Time:  1300  Speech Stop Time:  1320     Speech Total Time (min):  20 min    Billable Minutes: Treatment Swallowing Dysfunction 20min    Emerita Michaud CF-SLP  03/07/2020

## 2020-03-07 NOTE — ASSESSMENT & PLAN NOTE
Blood cultures (+) MRSA  Likely from IV heroin use  Continue Vancomycin  Blood cultures repeated  Echo performed: WNL  Repeat blood culture no growth,consulted SW for LTAC placement,he is IV drug user,can not go home.reperat blood culture show growth, placed picc line.    Afrberile,no place in LTAC yet.  Afebrile.

## 2020-03-07 NOTE — SUBJECTIVE & OBJECTIVE
Interval History: No new issues.     Review of Systems   Constitutional: Negative for activity change.   HENT: Negative for congestion.    Respiratory: Positive for cough. Negative for chest tightness and shortness of breath.    Cardiovascular: Negative for chest pain.   Gastrointestinal: Negative for abdominal distention and abdominal pain.   Musculoskeletal: Negative for arthralgias.   Psychiatric/Behavioral: Negative for agitation and behavioral problems.     Objective:     Vital Signs (Most Recent):  Temp: 98.3 °F (36.8 °C) (03/07/20 0812)  Pulse: 103 (03/07/20 0812)  Resp: 20 (03/07/20 0812)  BP: 119/63 (03/07/20 0812)  SpO2: 96 % (03/07/20 0812) Vital Signs (24h Range):  Temp:  [98.3 °F (36.8 °C)-99.7 °F (37.6 °C)] 98.3 °F (36.8 °C)  Pulse:  [] 103  Resp:  [18-20] 20  SpO2:  [96 %-100 %] 96 %  BP: ()/(52-67) 119/63     Weight: 57.4 kg (126 lb 8.7 oz)  Body mass index is 19.24 kg/m².    Intake/Output Summary (Last 24 hours) at 3/7/2020 1046  Last data filed at 3/7/2020 0758  Gross per 24 hour   Intake 2280 ml   Output 1675 ml   Net 605 ml      Physical Exam   Constitutional: He is oriented to person, place, and time. He appears well-developed and well-nourished.   HENT:   Head: Normocephalic and atraumatic.   Neurological: He is alert and oriented to person, place, and time.   Skin: Skin is warm and dry.   Psychiatric: He has a normal mood and affect. His behavior is normal.   Nursing note and vitals reviewed.      Significant Labs:   BMP:   Recent Labs   Lab 03/07/20  0519   *   *   K 4.1   CL 95   CO2 25   BUN 9   CREATININE 0.9   CALCIUM 8.4*     CBC:   Recent Labs   Lab 03/06/20  0510 03/07/20  0519   WBC 7.56 5.73   HGB 8.3* 8.4*   HCT 26.4* 28.0*   * 632*       Significant Imaging:

## 2020-03-07 NOTE — ASSESSMENT & PLAN NOTE
DKA resolved  Now with hypoglycemia  Has received D50, patient eating well  If hypoglycemia persist, will start D51/2NS    Resolved.

## 2020-03-07 NOTE — PROGRESS NOTES
[facility] Hospital Medicine  Progress Note    Patient Name: Cali Mabry  MRN: [id number]  Patient Class: IP- Inpatient   Admission Date: 2/22/2020  Length of Stay: 14 days  Attending Physician: Tyrese Dickinson MD  Primary Care Provider: Primary Doctor No        Subjective:     Principal Problem:Diabetic ketoacidosis without coma associated with diabetes mellitus due to underlying condition        HPI:  54-year-old male past medical history diabetes, hepatitis, hypertension history of polysubstance abuse, pancreatitis presents with abdominal pain for the last 2 days with nausea and vomiting home, and worsening body aches over the last day.  Patient reports he has not had his insulin in some time and reports his glucose is very high.  He states using IV heroin last use few days ago reportedly.  He reports coughing significant amount denies any phlegm production, denies any blood in his sputum, or any chest pain. Denies any blood in her stool, reports normal last bowel movement, reports he is urinating as normal and reports he is extremely thirsty and hungry.  Denies any fevers/chills, no extremity weakness/numbness/tingling.he is in DKA with blood sugar of 635,AG of 26 and CO 2 of 6,he has benen started on DKA protocol,RAMYA with CRT of 1.7,chest  Ray show also sign of pneumonia,patient has been started on broad spectrum IV Abx afterr bload culture is done,    Overview/Hospital Course:  54-year-old male past medical history diabetes, hepatitis, hypertension history of polysubstance abuse,IV heroin,chronic  pancreatitis presents with abdominal pain for the last 2 days with nausea and vomiting home, and worsening body aches over the last day.  Patient reports he has not had his insulin in some time and reports his glucose is very high.  He states using IV heroin last use few days ago reportedly.  He reports coughing significant amount denies any phlegm production, denies any blood in his  sputum, or any chest pain. Denies any blood in her stool, reports normal last bowel movement, reports he is urinating as normal and reports he is extremely thirsty and hungry.  Denies any fevers/chills, no extremity weakness/numbness/tingling.he is in DKA with blood sugar of 635,AG of 26 and CO 2 of 6,he has been  started on DKA protocol,RAMYA with CRT of 1.7,chest  ray show also sign of pneumonia,patient has been started on broad spectrum IV Abx afterr bload culture is done,  AG is closed,started on SQ insulin,started on diet.  Has bacteremia,staph aureus,likely from IV heroin use,alreday on Vanc.repeated blood culture,Echo to R/Oed out  endocarditis.  On Zosyn and azithromycin for pneumonia.  Repeat blood culture no growth,consulted SW for LTAC placement,he is IV drug user,can not go home.reperat blood culture show growth,will place picc line.  Afrberile,no place in LTAC yet.  Podiatry cleaned right heel callus,  Afebrile.  Require more oxygen supplement,chest X ray show possible aspiration,adjusted Abx,changed Rocephin to zosyn,added IS,aspiration precaution,consulted ST.on puree diet,oxygenation is improved,but he is septic with fever,leucocytiosis,tachycardia,tachypnea,likely duo to pneumonia.repeated blood culture.no growth,repeat chest x ray show some improvement.  Consulted PT,OT.  HH is drop today with no sign of bleeding,will transfuse pack of RBC.  Patient refused GI evaluation,EGD for esophogeal dysmotility.  He is again febrile,blood culture is repeated,picc line remove and send to culture,will follow up with cultures,  Will repeat chest X ray.  Has abnormal head CT ,no acute process,but Prominent ventricular system, may be greater than expected for generalized cerebral volume loss.  Normal pressure hydrocephalus is a consideration,consulted neurology.  CT chest show septic emboli,alreday on broad spectrum IV Abx,  Finally he is agree with EGD,show only gastritis,on PPI.    A/P,patient with history of IV  heroin abuse,is admitted for DKA, pneumonia,septic emboly,MRSA bacteremia from IV heroin use,on vancomycin until 3.8.20 also on zosyn for pneumonia,on puree diet for possible aspiration,EGD show only gastritis,  PT,OT recomnding SNF,but he want go home,does not want SNF,DC on Sunday if stable with PO Augmentin,chest X  ray can be repated in AM,order is in placed.      Interval History: No new issues.     Review of Systems   Constitutional: Negative for activity change.   HENT: Negative for congestion.    Respiratory: Positive for cough. Negative for chest tightness and shortness of breath.    Cardiovascular: Negative for chest pain.   Gastrointestinal: Negative for abdominal distention and abdominal pain.   Musculoskeletal: Negative for arthralgias.   Psychiatric/Behavioral: Negative for agitation and behavioral problems.     Objective:     Vital Signs (Most Recent):  Temp: 98.3 °F (36.8 °C) (03/07/20 0812)  Pulse: 103 (03/07/20 0812)  Resp: 20 (03/07/20 0812)  BP: 119/63 (03/07/20 0812)  SpO2: 96 % (03/07/20 0812) Vital Signs (24h Range):  Temp:  [98.3 °F (36.8 °C)-99.7 °F (37.6 °C)] 98.3 °F (36.8 °C)  Pulse:  [] 103  Resp:  [18-20] 20  SpO2:  [96 %-100 %] 96 %  BP: ()/(52-67) 119/63     Weight: 57.4 kg (126 lb 8.7 oz)  Body mass index is 19.24 kg/m².    Intake/Output Summary (Last 24 hours) at 3/7/2020 1046  Last data filed at 3/7/2020 0758  Gross per 24 hour   Intake 2280 ml   Output 1675 ml   Net 605 ml      Physical Exam   Constitutional: He is oriented to person, place, and time. He appears well-developed and well-nourished.   HENT:   Head: Normocephalic and atraumatic.   Neurological: He is alert and oriented to person, place, and time.   Skin: Skin is warm and dry.   Psychiatric: He has a normal mood and affect. His behavior is normal.   Nursing note and vitals reviewed.      Significant Labs:   BMP:   Recent Labs   Lab 03/07/20  0519   *   *   K 4.1   CL 95   CO2 25   BUN 9    CREATININE 0.9   CALCIUM 8.4*     CBC:   Recent Labs   Lab 03/06/20  0510 03/07/20  0519   WBC 7.56 5.73   HGB 8.3* 8.4*   HCT 26.4* 28.0*   * 632*       Significant Imaging:      Assessment/Plan:      * Diabetic ketoacidosis without coma associated with diabetes mellitus due to underlying condition  DKA resolved  Now with hypoglycemia  Has received D50, patient eating well  If hypoglycemia persist, will start D51/2NS    Resolved.       Type 2 diabetes mellitus, uncontrolled  Currently hypoglycemia  Will monitor blood glucose q.a.c. and HS  Will adjust regimen based on blood glucose trends      Acute gastritis without hemorrhage  Per EGD,on PPI.      Encephalopathy, toxic    Has abnormal head CT ,no acute process,but Prominent ventricular system, may be greater than expected for generalized cerebral volume loss.  Normal pressure hydrocephalus is a consideration,consulted neurology.      Somnolence  As above,  Resolved,back to baseline.      Dysphagia    Patient initially refused GI evaluation,EGD for esophogeal dysmotility.      Finally he is agree with EGD,show only gastritis,      Sepsis   he is septic with fever,leucocytiosis,tachycardia,tachypnea,likely duo to pneumonia.repeated blood culture.on IV Abx,fever is improved,no growth in repeat blood culture.  He is again febrile,blood culture is repeated,picc line remove and send to culture,will follow up with cultures,    CT chest show septic emboli,alreday on broad spectrum IV Abx,  Fever is resolved,leucocytosis is resolved,    Acute respiratory failure with hypoxia  Duo to pneumonia,Require more oxygen supplement,chest X ray show possible aspiration,adjusted Abx,chnaged Rocephin to zosyn,added IS,aspiration precaution,consulted ST.on puree diet,oxygenation is improving slowly.    Patient refused GI evaluation,EGD for esophogeal dysmotility.then accepted,done,has gastritis,.  Need RA oxygen be checked before DC.    MRSA bacteremia  Blood cultures (+)  MRSA  Likely from IV heroin use  Continue Vancomycin  Blood cultures repeated  Echo performed: WNL  Repeat blood culture no growth,consulted SW for LTAC placement,he is IV drug user,can not go home.reperat blood culture show growth, placed picc line.    Afrberile,no place in LTAC yet.  Afebrile.        Pneumonia due to infectious organism  Per clinical presentation and imaging,will continue with IV Abx.no growth,repeat chest x ray show some improvement.      Chronic hepatitis C  Will monitor.      Anemia of chronic disease    HH is drop today with no sign of bleeding,will transfuse pack of RBC.      Intravenous drug abuse  Consult on IV heroin cessation use  Patient states that he quit 3-4 weeks ago      Hyperlipidemia  On statin.      Pancreatitis, chronic  Stable at this  time.      Essential hypertension  Stable at this time with no meds  Will monitor throughout hospital course      VTE Risk Mitigation (From admission, onward)         Ordered     enoxaparin injection 40 mg  Daily      02/22/20 1157     IP VTE HIGH RISK PATIENT  Once      02/22/20 1157     Place DAWIT hose  Until discontinued      02/22/20 1157                Continue Abx today. Last day of Abx is on 3/8. Patient does not want SNF. He is requesting H/H.  Will d/c on 3/8 with bedside commode and shower chair.         Tyrese Singh MD  Department of Hospital Medicine   [facility]

## 2020-03-07 NOTE — PLAN OF CARE
Problem: Adult Inpatient Plan of Care  Goal: Plan of Care Review  Outcome: Ongoing, Progressing  Goal: Patient-Specific Goal (Individualization)  Outcome: Ongoing, Progressing  Goal: Absence of Hospital-Acquired Illness or Injury  Outcome: Ongoing, Progressing  Goal: Optimal Comfort and Wellbeing  Outcome: Ongoing, Progressing  Goal: Readiness for Transition of Care  Outcome: Ongoing, Progressing  Goal: Rounds/Family Conference  Outcome: Ongoing, Progressing     Problem: Diabetes Comorbidity  Goal: Blood Glucose Level Within Desired Range  Outcome: Ongoing, Progressing     Problem: Oral Intake Inadequate  Goal: Improved Oral Intake  Outcome: Ongoing, Progressing     Problem: Skin Injury Risk Increased  Goal: Skin Health and Integrity  Outcome: Ongoing, Progressing     Problem: Fall Injury Risk  Goal: Absence of Fall and Fall-Related Injury  Outcome: Ongoing, Progressing     Problem: Infection  Goal: Infection Symptom Resolution  Outcome: Ongoing, Progressing     Problem: Wound  Goal: Optimal Wound Healing  Outcome: Ongoing, Progressing

## 2020-03-08 VITALS
SYSTOLIC BLOOD PRESSURE: 98 MMHG | RESPIRATION RATE: 18 BRPM | DIASTOLIC BLOOD PRESSURE: 63 MMHG | WEIGHT: 126.56 LBS | HEIGHT: 68 IN | BODY MASS INDEX: 19.18 KG/M2 | OXYGEN SATURATION: 94 % | TEMPERATURE: 99 F | HEART RATE: 104 BPM

## 2020-03-08 PROBLEM — R40.0 SOMNOLENCE: Status: RESOLVED | Noted: 2020-03-03 | Resolved: 2020-03-08

## 2020-03-08 PROBLEM — J18.9 PNEUMONIA DUE TO INFECTIOUS ORGANISM: Status: RESOLVED | Noted: 2020-02-22 | Resolved: 2020-03-08

## 2020-03-08 PROBLEM — R78.81 MRSA BACTEREMIA: Status: RESOLVED | Noted: 2020-02-23 | Resolved: 2020-03-08

## 2020-03-08 PROBLEM — G92.9 ENCEPHALOPATHY, TOXIC: Status: RESOLVED | Noted: 2020-03-03 | Resolved: 2020-03-08

## 2020-03-08 PROBLEM — B95.62 MRSA BACTEREMIA: Status: RESOLVED | Noted: 2020-02-23 | Resolved: 2020-03-08

## 2020-03-08 PROBLEM — K29.00 ACUTE GASTRITIS WITHOUT HEMORRHAGE: Status: RESOLVED | Noted: 2020-03-06 | Resolved: 2020-03-08

## 2020-03-08 PROBLEM — R13.10 DYSPHAGIA: Status: RESOLVED | Noted: 2020-03-03 | Resolved: 2020-03-08

## 2020-03-08 PROBLEM — E08.10 DIABETIC KETOACIDOSIS WITHOUT COMA ASSOCIATED WITH DIABETES MELLITUS DUE TO UNDERLYING CONDITION: Status: RESOLVED | Noted: 2020-02-22 | Resolved: 2020-03-08

## 2020-03-08 PROBLEM — J96.01 ACUTE RESPIRATORY FAILURE WITH HYPOXIA: Status: RESOLVED | Noted: 2020-02-29 | Resolved: 2020-03-08

## 2020-03-08 PROBLEM — A41.9 SEPSIS: Status: RESOLVED | Noted: 2020-03-01 | Resolved: 2020-03-08

## 2020-03-08 LAB
BACTERIA BLD CULT: NORMAL
BACTERIA BLD CULT: NORMAL
POCT GLUCOSE: 106 MG/DL (ref 70–110)
POCT GLUCOSE: 89 MG/DL (ref 70–110)

## 2020-03-08 PROCEDURE — 25000003 PHARM REV CODE 250: Performed by: HOSPITALIST

## 2020-03-08 PROCEDURE — 63600175 PHARM REV CODE 636 W HCPCS: Performed by: HOSPITALIST

## 2020-03-08 RX ORDER — OXYCODONE AND ACETAMINOPHEN 5; 325 MG/1; MG/1
1 TABLET ORAL EVERY 4 HOURS PRN
Qty: 15 TABLET | Refills: 0 | Status: ON HOLD | OUTPATIENT
Start: 2020-03-08 | End: 2020-08-08 | Stop reason: ALTCHOICE

## 2020-03-08 RX ADMIN — GABAPENTIN 300 MG: 300 CAPSULE ORAL at 08:03

## 2020-03-08 RX ADMIN — GABAPENTIN 300 MG: 300 CAPSULE ORAL at 02:03

## 2020-03-08 RX ADMIN — INSULIN DETEMIR 10 UNITS: 100 INJECTION, SOLUTION SUBCUTANEOUS at 08:03

## 2020-03-08 RX ADMIN — MUPIROCIN: 20 OINTMENT TOPICAL at 08:03

## 2020-03-08 RX ADMIN — HYDROXYZINE PAMOATE 25 MG: 25 CAPSULE ORAL at 05:03

## 2020-03-08 RX ADMIN — DOCUSATE SODIUM - SENNOSIDES 1 TABLET: 50; 8.6 TABLET, FILM COATED ORAL at 08:03

## 2020-03-08 RX ADMIN — INSULIN ASPART 5 UNITS: 100 INJECTION, SOLUTION INTRAVENOUS; SUBCUTANEOUS at 08:03

## 2020-03-08 RX ADMIN — INSULIN ASPART 5 UNITS: 100 INJECTION, SOLUTION INTRAVENOUS; SUBCUTANEOUS at 11:03

## 2020-03-08 RX ADMIN — PIPERACILLIN AND TAZOBACTAM 4.5 G: 4; .5 INJECTION, POWDER, FOR SOLUTION INTRAVENOUS at 08:03

## 2020-03-08 RX ADMIN — TRAMADOL HYDROCHLORIDE 50 MG: 50 TABLET, COATED ORAL at 06:03

## 2020-03-08 RX ADMIN — BENZONATATE 100 MG: 100 CAPSULE ORAL at 08:03

## 2020-03-08 RX ADMIN — HYDROXYZINE PAMOATE 25 MG: 25 CAPSULE ORAL at 02:03

## 2020-03-08 RX ADMIN — ATORVASTATIN CALCIUM 40 MG: 40 TABLET, FILM COATED ORAL at 08:03

## 2020-03-08 RX ADMIN — PIPERACILLIN AND TAZOBACTAM 4.5 G: 4; .5 INJECTION, POWDER, FOR SOLUTION INTRAVENOUS at 12:03

## 2020-03-08 RX ADMIN — PANTOPRAZOLE SODIUM 40 MG: 40 TABLET, DELAYED RELEASE ORAL at 08:03

## 2020-03-08 RX ADMIN — POTASSIUM PHOSPHATE, MONOBASIC 500 MG: 500 TABLET, SOLUBLE ORAL at 08:03

## 2020-03-08 RX ADMIN — BENZONATATE 100 MG: 100 CAPSULE ORAL at 02:03

## 2020-03-08 RX ADMIN — FUROSEMIDE 40 MG: 40 TABLET ORAL at 08:03

## 2020-03-08 NOTE — SUBJECTIVE & OBJECTIVE
Interval History: wants to go home. No complaints.       Review of Systems   Constitutional: Negative for activity change.   HENT: Negative for congestion.    Respiratory: Positive for cough. Negative for chest tightness and shortness of breath.    Cardiovascular: Negative for chest pain.   Gastrointestinal: Negative for abdominal distention and abdominal pain.   Musculoskeletal: Negative for arthralgias.   Psychiatric/Behavioral: Negative for agitation and behavioral problems.     Objective:     Vital Signs (Most Recent):  Temp: 99.8 °F (37.7 °C) (03/08/20 0148)  Pulse: 105 (03/08/20 0604)  Resp: 20 (03/08/20 0148)  BP: 137/68 (03/08/20 0604)  SpO2: (!) 92 % (03/08/20 0604) Vital Signs (24h Range):  Temp:  [99.2 °F (37.3 °C)-99.8 °F (37.7 °C)] 99.8 °F (37.7 °C)  Pulse:  [] 105  Resp:  [17-20] 20  SpO2:  [92 %-96 %] 92 %  BP: ()/(58-68) 137/68     Weight: 57.4 kg (126 lb 8.7 oz)  Body mass index is 19.24 kg/m².    Intake/Output Summary (Last 24 hours) at 3/8/2020 1025  Last data filed at 3/8/2020 0400  Gross per 24 hour   Intake 900 ml   Output 1500 ml   Net -600 ml      Physical Exam   Constitutional: He is oriented to person, place, and time. He appears well-developed and well-nourished.   HENT:   Head: Normocephalic and atraumatic.   Neurological: He is alert and oriented to person, place, and time.   Skin: Skin is warm and dry.   Psychiatric: He has a normal mood and affect. His behavior is normal.   Nursing note and vitals reviewed.      Significant Labs:   BMP:   Recent Labs   Lab 03/07/20 0519   *   *   K 4.1   CL 95   CO2 25   BUN 9   CREATININE 0.9   CALCIUM 8.4*     CBC:   Recent Labs   Lab 03/07/20 0519   WBC 5.73   HGB 8.4*   HCT 28.0*   *       Significant Imaging

## 2020-03-08 NOTE — DISCHARGE SUMMARY
[facility] Hospital Medicine  Discharge Summary      Patient Name: Cali Mabry  MRN: [id number]  Admission Date: 2/22/2020  Hospital Length of Stay: 15 days  Discharge Date and Time:  03/08/2020 10:31 AM  Attending Physician: Tyrese Dickinson MD   Discharging Provider: Tyrese Dickinson MD  Primary Care Provider: Primary Doctor No      HPI:   54-year-old male past medical history diabetes, hepatitis, hypertension history of polysubstance abuse, pancreatitis presents with abdominal pain for the last 2 days with nausea and vomiting home, and worsening body aches over the last day.  Patient reports he has not had his insulin in some time and reports his glucose is very high.  He states using IV heroin last use few days ago reportedly.  He reports coughing significant amount denies any phlegm production, denies any blood in his sputum, or any chest pain. Denies any blood in her stool, reports normal last bowel movement, reports he is urinating as normal and reports he is extremely thirsty and hungry.  Denies any fevers/chills, no extremity weakness/numbness/tingling.he is in DKA with blood sugar of 635,AG of 26 and CO 2 of 6,he has benen started on DKA protocol,RAMYA with CRT of 1.7,chest  Ray show also sign of pneumonia,patient has been started on broad spectrum IV Abx afterr bload culture is done,    Procedure(s) (LRB):  EGD (ESOPHAGOGASTRODUODENOSCOPY) (N/A)      Hospital Course:   54-year-old male past medical history diabetes, hepatitis, hypertension history of polysubstance abuse,IV heroin,chronic  pancreatitis presents with abdominal pain for the last 2 days with nausea and vomiting home, and worsening body aches over the last day.  Patient reports he has not had his insulin in some time and reports his glucose is very high.  He states using IV heroin last use few days ago reportedly.  He reports coughing significant amount denies any phlegm production, denies any blood in his sputum,  or any chest pain. Denies any blood in her stool, reports normal last bowel movement, reports he is urinating as normal and reports he is extremely thirsty and hungry.  Denies any fevers/chills, no extremity weakness/numbness/tingling.he is in DKA with blood sugar of 635,AG of 26 and CO 2 of 6,he has been  started on DKA protocol,RAMYA with CRT of 1.7,chest  ray show also sign of pneumonia,patient has been started on broad spectrum IV Abx afterr bload culture is done,  AG is closed,started on SQ insulin,started on diet.  Has bacteremia,staph aureus,likely from IV heroin use,alreday on Vanc.repeated blood culture,Echo to R/Oed out  endocarditis.  On Zosyn and azithromycin for pneumonia.  Repeat blood culture no growth,consulted SW for LTAC placement,he is IV drug user,can not go home.reperat blood culture show growth,will place picc line.  Afrberile,no place in LTAC yet.  Podiatry cleaned right heel callus,  Afebrile.  Require more oxygen supplement,chest X ray show possible aspiration,adjusted Abx,changed Rocephin to zosyn,added IS,aspiration precaution,consulted ST.on puree diet,oxygenation is improved,but he is septic with fever,leucocytiosis,tachycardia,tachypnea,likely duo to pneumonia.repeated blood culture.no growth,repeat chest x ray show some improvement.  Consulted PT,OT.  HH is drop today with no sign of bleeding,will transfuse pack of RBC.  Patient refused GI evaluation,EGD for esophogeal dysmotility.  He is again febrile,blood culture is repeated,picc line remove and send to culture,will follow up with cultures,  Will repeat chest X ray.  Has abnormal head CT ,no acute process,but Prominent ventricular system, may be greater than expected for generalized cerebral volume loss.  Normal pressure hydrocephalus is a consideration,consulted neurology.  CT chest show septic emboli,alreday on broad spectrum IV Abx,  Finally he is agree with EGD,show only gastritis,on PPI.  The patient completed his Abx on 3/8.   PT/OT rec: H/H with shower chair and bedside commode. The patient will be discharged to home today. Activity as tolerated. Diet low NA, ADA 1800 lexie diet    Follow up with PCP in one week.          Consults:   Consults (From admission, onward)        Status Ordering Provider     Inpatient consult to Gastroenterology  Once     Provider:  Huber Camarena MD    Completed AKHONDZADEH, ABDOLAZIM     Inpatient consult to Infectious Diseases  Once     Provider:  Ally Rivas APRN, ANP    Completed AZEB TORRES     Inpatient consult to Neurology  Once     Provider:  Carlos Boone MD    Completed AKHONDZADEH, ABDOLAZIM     [facility]  Once     Provider:  (Not yet assigned)    Completed AKHONDZADEH, ABDOLAZIM     Inpatient consult to Podiatry  Once     Provider:  Melva Nice DPM    Completed AKHONDZADEH, ABDOLAZIM     Inpatient consult to Registered Dietitian/Nutritionist  Once     Provider:  (Not yet assigned)    Completed AKHONDZADEH, ABDOLAZIM     Inpatient consult to Social Work  Once     Provider:  (Not yet assigned)    Acknowledged AKHONDZADEH, ABDOLAZIM     Inpatient consult to Social Work  Once     Provider:  (Not yet assigned)    Acknowledged AKHONDZADEH, ABDOLAZIM     Pharmacy to dose Vancomycin consult  Once     Provider:  (Not yet assigned)    Acknowledged AKHONDZADEH, ABDOLAZIM          No new Assessment & Plan notes have been filed under this hospital service since the last note was generated.  Service: Hospital Medicine    Final Active Diagnoses:    Diagnosis Date Noted POA    Type 2 diabetes mellitus, uncontrolled [E11.65] 04/03/2016 Yes     Chronic    Intravenous drug abuse [F19.10] 11/23/2017 Yes     Chronic    Hyperlipidemia [E78.5] 11/23/2017 Yes     Chronic    Chronic hepatitis C [B18.2] 11/23/2017 Yes     Chronic    Anemia of chronic disease [D63.8] 11/23/2017 Yes     Chronic    Pancreatitis, chronic [K86.1] 04/03/2016 Yes    Essential hypertension  "[I10] 04/03/2016 Yes     Chronic      Problems Resolved During this Admission:    Diagnosis Date Noted Date Resolved POA    PRINCIPAL PROBLEM:  Diabetic ketoacidosis without coma associated with diabetes mellitus due to underlying condition [E08.10] 02/22/2020 03/08/2020 Yes    Acute gastritis without hemorrhage [K29.00] 03/06/2020 03/08/2020 Yes    Dysphagia [R13.10] 03/03/2020 03/08/2020 Yes    Somnolence [R40.0] 03/03/2020 03/08/2020 No    Encephalopathy, toxic [G92] 03/03/2020 03/08/2020 No    Sepsis [A41.9] 03/01/2020 03/08/2020 No    Acute respiratory failure with hypoxia [J96.01] 02/29/2020 03/08/2020 Yes    MRSA bacteremia [R78.81] 02/23/2020 03/08/2020 Yes    Pneumonia due to infectious organism [J18.9] 02/22/2020 03/08/2020 Yes    RAMYA (acute kidney injury) [N17.9] 02/22/2020 02/24/2020 Yes       Discharged Condition: good    Disposition: Home-Health Care [facility] [place]    Follow Up:  Follow-up Information     Bradley Coffman MD On 3/19/2020.    Why:  Appointment made March 19th @ 11:15  Contact information:  [facility] [address]  [phone]           Primary Doctor No In 1 week.               Patient Instructions:      BATH/SHOWER CHAIR FOR HOME USE     Order Specific Question Answer Comments   Height: 5' 8" (1.727 m)    Weight: 57.4 kg (126 lb 8.7 oz)    Does patient have medical equipment at home? cane, straight    Length of need (1-99 months): 99    Type: With back      COMMODE FOR HOME USE     Order Specific Question Answer Comments   Type: Standard    Height: 5' 8" (1.727 m)    Weight: 57.4 kg (126 lb 8.7 oz)    Does patient have medical equipment at home? cane, straight    Length of need (1-99 months): 99        Significant Diagnostic Studies:    Pending Diagnostic Studies:     Procedure Component Value Units Date/Time    Specimen to Pathology, Surgery Gastrointestinal tract [[id number]] Collected:  03/05/20 1459    Order Status:  Sent Lab Status:  In process Updated:  03/06/20 0734 "         Medications:  Reconciled Home Medications:      Medication List      START taking these medications    oxyCODONE-acetaminophen 5-325 mg per tablet  Commonly known as:  PERCOCET  Take 1 tablet by mouth every 4 (four) hours as needed for Pain.        CHANGE how you take these medications    Lantus U-100 Insulin 100 unit/mL injection  Generic drug:  insulin glargine  Inject 15 Units into the skin 2 (two) times daily.  What changed:    · how much to take  · when to take this        CONTINUE taking these medications    atorvastatin 40 MG tablet  Commonly known as:  LIPITOR  Take 40 mg by mouth once daily.     gabapentin 300 MG capsule  Commonly known as:  NEURONTIN  Take 300 mg by mouth.     ibuprofen 600 MG tablet  Commonly known as:  ADVIL,MOTRIN  Take 1 tablet (600 mg total) by mouth every 6 (six) hours as needed for Pain.     insulin aspart U-100 100 unit/mL (3 mL) Inpn pen  Commonly known as:  NovoLOG  Inject 10 Units into the skin.     * naloxone 4 mg/actuation Spry  Commonly known as:  NARCAN  4mg by nasal route as needed for opioid overdose; may repeat every 2-3 minutes in alternating nostrils until medical help arrives. Call 911     * naloxone 1 mg/mL injection  Commonly known as:  NARCAN  2 mg (1 mg per nostril) by Nasal route as needed for opioid overdose; may repeat in 3 to 5 minutes if not effective. Call 911     nicotine 21 mg/24 hr  Commonly known as:  NICODERM CQ  Place 1 patch onto the skin once daily.     omeprazole 40 MG capsule  Commonly known as:  PriLOSEC  Take 1 capsule (40 mg total) by mouth once daily.         * This list has 2 medication(s) that are the same as other medications prescribed for you. Read the directions carefully, and ask your doctor or other care provider to review them with you.                Indwelling Lines/Drains at time of discharge:   Lines/Drains/Airways     None                 Time spent on the discharge of patient:  > 30  minutes  Patient was seen and examined  on the date of discharge and determined to be suitable for discharge.         Tyrese Singh MD  Department of Hospital Medicine  [facility]

## 2020-03-08 NOTE — PROGRESS NOTES
[facility] CASE MANAGEMENT                  WRITTEN DISCHARGE INFORMATION    Follow-up Information     Bradley Coffman MD On 3/19/2020.    Why:  Appointment made March 19th @ 11:15  Contact information:  [facility]  [address]  [phone]           [facility].    Specialties:  DME Provider, Home Health Services  Why:  Agency will contact pt and schedule a home visit.  Contact information:  [place] [id number]  [phone]               APPOINTMENTS AND RESOURCES TO HELP YOU MANAGE YOUR CARE AT HOME BASED ON YOUR PREFERENCES:  (If an appointment is not scheduled for you when you leave the hospital, call your doctor to schedule a follow up visit within a week)        Healthy Living Instructions to HELP MANAGE YOUR CARE AT HOME:  Things You are responsible for:  1.    Getting your prescriptions filled   2.    Taking your medications as directed, DO NOT MISS ANY DOSES!  3.    Following the diet and exercise recommended by your doctor  4.    Going to your follow-up doctor appointment. This is important because it allows the doctor to monitor your progress and determine if any changes need to made to your treatment plan.  5. If you have any questions about MANAGING YOUR CARE AT HOME Call the Nurse Care Line for 24/7 Assistance [phone]       Please answer any calls you may receive from [facility]. We want to continue to support you as you manage your healthcare needs. [facility] is happy to have the opportunity to serve you.      Thank you for choosing [facility] for your healthcare needs!  Your [facility] Case Management Team,

## 2020-03-08 NOTE — NURSING
Pt discharged per MD order. IV dc'd, catheter tip intact. VS stable, afebrile, NAD, no nausea/vomiting. Pt educated on discharge instructions with pt verbally indicating understanding. Pt discharged to home. Off unit via wheelchair.

## 2020-03-08 NOTE — PLAN OF CARE
03/08/20 1229   Post-Acute Status   Post-Acute Authorization Home Health/Hospice   Post-Acute Placement Status Set-up Complete

## 2020-03-08 NOTE — PLAN OF CARE
[facility]    HOME HEALTH ORDERS  FACE TO FACE ENCOUNTER    Patient Name: Cali Mabry  YOB: 1965    PCP: Primary Doctor No   PCP Address: None  PCP Phone Number: None  PCP Fax: None    Encounter Date: 03/08/2020    Admit to Home Health    Diagnoses:  Active Hospital Problems    Diagnosis  POA    Type 2 diabetes mellitus, uncontrolled [E11.65]  Yes     Priority: 2      Chronic    Intravenous drug abuse [F19.10]  Yes     Chronic    Hyperlipidemia [E78.5]  Yes     Chronic    Chronic hepatitis C [B18.2]  Yes     Chronic    Anemia of chronic disease [D63.8]  Yes     Chronic    Pancreatitis, chronic [K86.1]  Yes    Essential hypertension [I10]  Yes     Chronic      Resolved Hospital Problems    Diagnosis Date Resolved POA    *Diabetic ketoacidosis without coma associated with diabetes mellitus due to underlying condition [E08.10] 03/08/2020 Yes     Priority: 1 - High    Acute gastritis without hemorrhage [K29.00] 03/08/2020 Yes    Dysphagia [R13.10] 03/08/2020 Yes    Somnolence [R40.0] 03/08/2020 No    Encephalopathy, toxic [G92] 03/08/2020 No    Sepsis [A41.9] 03/08/2020 No    Acute respiratory failure with hypoxia [J96.01] 03/08/2020 Yes    MRSA bacteremia [R78.81] 03/08/2020 Yes    Pneumonia due to infectious organism [J18.9] 03/08/2020 Yes    RAMYA (acute kidney injury) [N17.9] 02/24/2020 Yes       No future appointments.  Follow-up Information     Bradley Coffman MD On 3/19/2020.    Why:  Appointment made March 19th @ 11:15  Contact information:  [facility] [address]  [phone]           Primary Doctor No In 1 week.                   I have seen and examined this patient face to face today. My clinical findings that support the need for the home health skilled services and home bound status are the following:  Weakness/numbness causing balance and gait disturbance due to Weakness/Debility making it taxing to leave home.    Allergies:Review of  patient's allergies indicates:  No Known Allergies    Diet: diabetic diet: 2000 calorie and 2 gram sodium diet    Activities: activity as tolerated    Nursing:   SN to complete comprehensive assessment including routine vital signs. Instruct on disease process and s/s of complications to report to MD. Review/verify medication list sent home with the patient at time of discharge  and instruct patient/caregiver as needed. Frequency may be adjusted depending on start of care date.    Notify MD if SBP > 160 or < 90; DBP > 90 or < 50; HR > 120 or < 50; Temp > 101; Other:       CONSULTS:    Physical Therapy to evaluate and treat. Evaluate for home safety and equipment needs; Establish/upgrade home exercise program. Perform / instruct on therapeutic exercises, gait training, transfer training, and Range of Motion.  Occupational Therapy to evaluate and treat. Evaluate home environment for safety and equipment needs. Perform/Instruct on transfers, ADL training, ROM, and therapeutic exercises.  Aide to provide assistance with personal care, ADLs, and vital signs.    MISCELLANEOUS CARE:              Medications: Review discharge medications with patient and family and provide education.      Current Discharge Medication List      START taking these medications    Details   oxyCODONE-acetaminophen (PERCOCET) 5-325 mg per tablet Take 1 tablet by mouth every 4 (four) hours as needed for Pain.  Qty: 15 tablet, Refills: 0    Comments: Quantity prescribed more than 7 day supply? Yes, quantity medically necessary         CONTINUE these medications which have NOT CHANGED    Details   atorvastatin (LIPITOR) 40 MG tablet Take 40 mg by mouth once daily.      gabapentin (NEURONTIN) 300 MG capsule Take 300 mg by mouth.      ibuprofen (ADVIL,MOTRIN) 600 MG tablet Take 1 tablet (600 mg total) by mouth every 6 (six) hours as needed for Pain.  Qty: 20 tablet, Refills: 0      insulin aspart U-100 (NOVOLOG) 100 unit/mL (3 mL) InPn pen Inject 10  Units into the skin.      LANTUS 100 unit/mL injection Inject 15 Units into the skin 2 (two) times daily.  Qty: 9 mL, Refills: 0      naloxone (NARCAN) 1 mg/mL injection 2 mg (1 mg per nostril) by Nasal route as needed for opioid overdose; may repeat in 3 to 5 minutes if not effective. Call 911  Qty: 2 mL, Refills: 11      naloxone (NARCAN) 4 mg/actuation Spry 4mg by nasal route as needed for opioid overdose; may repeat every 2-3 minutes in alternating nostrils until medical help arrives. Call 911  Qty: 1 each, Refills: 11      nicotine (NICODERM CQ) 21 mg/24 hr Place 1 patch onto the skin once daily.  Refills: 0      omeprazole (PRILOSEC) 40 MG capsule Take 1 capsule (40 mg total) by mouth once daily.  Qty: 30 capsule, Refills: 11             I certify that this patient is confined to his home and needs intermittent skilled nursing care, physical therapy and occupational therapy.

## 2020-03-08 NOTE — PROGRESS NOTES
[facility] Hospital Medicine  Progress Note    Patient Name: Cali Mabry  MRN: [id number]  Patient Class: IP- Inpatient   Admission Date: 2/22/2020  Length of Stay: 15 days  Attending Physician: Tyrese Dickinson MD  Primary Care Provider: Primary Doctor No        Subjective:     Principal Problem:Diabetic ketoacidosis without coma associated with diabetes mellitus due to underlying condition        HPI:  54-year-old male past medical history diabetes, hepatitis, hypertension history of polysubstance abuse, pancreatitis presents with abdominal pain for the last 2 days with nausea and vomiting home, and worsening body aches over the last day.  Patient reports he has not had his insulin in some time and reports his glucose is very high.  He states using IV heroin last use few days ago reportedly.  He reports coughing significant amount denies any phlegm production, denies any blood in his sputum, or any chest pain. Denies any blood in her stool, reports normal last bowel movement, reports he is urinating as normal and reports he is extremely thirsty and hungry.  Denies any fevers/chills, no extremity weakness/numbness/tingling.he is in DKA with blood sugar of 635,AG of 26 and CO 2 of 6,he has benen started on DKA protocol,RAMYA with CRT of 1.7,chest  Ray show also sign of pneumonia,patient has been started on broad spectrum IV Abx afterr bload culture is done,    Overview/Hospital Course:  54-year-old male past medical history diabetes, hepatitis, hypertension history of polysubstance abuse,IV heroin,chronic  pancreatitis presents with abdominal pain for the last 2 days with nausea and vomiting home, and worsening body aches over the last day.  Patient reports he has not had his insulin in some time and reports his glucose is very high.  He states using IV heroin last use few days ago reportedly.  He reports coughing significant amount denies any phlegm production, denies any blood in his  sputum, or any chest pain. Denies any blood in her stool, reports normal last bowel movement, reports he is urinating as normal and reports he is extremely thirsty and hungry.  Denies any fevers/chills, no extremity weakness/numbness/tingling.he is in DKA with blood sugar of 635,AG of 26 and CO 2 of 6,he has been  started on DKA protocol,RAMYA with CRT of 1.7,chest  ray show also sign of pneumonia,patient has been started on broad spectrum IV Abx afterr bload culture is done,  AG is closed,started on SQ insulin,started on diet.  Has bacteremia,staph aureus,likely from IV heroin use,alreday on Vanc.repeated blood culture,Echo to R/Oed out  endocarditis.  On Zosyn and azithromycin for pneumonia.  Repeat blood culture no growth,consulted SW for LTAC placement,he is IV drug user,can not go home.reperat blood culture show growth,will place picc line.  Afrberile,no place in LTAC yet.  Podiatry cleaned right heel callus,  Afebrile.  Require more oxygen supplement,chest X ray show possible aspiration,adjusted Abx,changed Rocephin to zosyn,added IS,aspiration precaution,consulted ST.on puree diet,oxygenation is improved,but he is septic with fever,leucocytiosis,tachycardia,tachypnea,likely duo to pneumonia.repeated blood culture.no growth,repeat chest x ray show some improvement.  Consulted PT,OT.  HH is drop today with no sign of bleeding,will transfuse pack of RBC.  Patient refused GI evaluation,EGD for esophogeal dysmotility.  He is again febrile,blood culture is repeated,picc line remove and send to culture,will follow up with cultures,  Will repeat chest X ray.  Has abnormal head CT ,no acute process,but Prominent ventricular system, may be greater than expected for generalized cerebral volume loss.  Normal pressure hydrocephalus is a consideration,consulted neurology.  CT chest show septic emboli,alreday on broad spectrum IV Abx,  Finally he is agree with EGD,show only gastritis,on PPI.  The patient completed his Abx on  3/8.  PT/OT rec: H/H with shower chair and bedside commode. The patient will be discharged to home today. Activity as tolerated. Diet- regular. Follow up with PCP in one week.         Interval History: wants to go home. No complaints.       Review of Systems   Constitutional: Negative for activity change.   HENT: Negative for congestion.    Respiratory: Positive for cough. Negative for chest tightness and shortness of breath.    Cardiovascular: Negative for chest pain.   Gastrointestinal: Negative for abdominal distention and abdominal pain.   Musculoskeletal: Negative for arthralgias.   Psychiatric/Behavioral: Negative for agitation and behavioral problems.     Objective:     Vital Signs (Most Recent):  Temp: 99.8 °F (37.7 °C) (03/08/20 0148)  Pulse: 105 (03/08/20 0604)  Resp: 20 (03/08/20 0148)  BP: 137/68 (03/08/20 0604)  SpO2: (!) 92 % (03/08/20 0604) Vital Signs (24h Range):  Temp:  [99.2 °F (37.3 °C)-99.8 °F (37.7 °C)] 99.8 °F (37.7 °C)  Pulse:  [] 105  Resp:  [17-20] 20  SpO2:  [92 %-96 %] 92 %  BP: ()/(58-68) 137/68     Weight: 57.4 kg (126 lb 8.7 oz)  Body mass index is 19.24 kg/m².    Intake/Output Summary (Last 24 hours) at 3/8/2020 1025  Last data filed at 3/8/2020 0400  Gross per 24 hour   Intake 900 ml   Output 1500 ml   Net -600 ml      Physical Exam   Constitutional: He is oriented to person, place, and time. He appears well-developed and well-nourished.   HENT:   Head: Normocephalic and atraumatic.   Neurological: He is alert and oriented to person, place, and time.   Skin: Skin is warm and dry.   Psychiatric: He has a normal mood and affect. His behavior is normal.   Nursing note and vitals reviewed.      Significant Labs:   BMP:   Recent Labs   Lab 03/07/20  0519   *   *   K 4.1   CL 95   CO2 25   BUN 9   CREATININE 0.9   CALCIUM 8.4*     CBC:   Recent Labs   Lab 03/07/20  0519   WBC 5.73   HGB 8.4*   HCT 28.0*   *       Significant Imaging      Assessment/Plan:       * Diabetic ketoacidosis without coma associated with diabetes mellitus due to underlying condition  DKA resolved  Now with hypoglycemia  Has received D50, patient eating well  If hypoglycemia persist, will start D51/2NS    Resolved.       Type 2 diabetes mellitus, uncontrolled  Currently hypoglycemia  Will monitor blood glucose q.a.c. and HS  Will adjust regimen based on blood glucose trends      Acute gastritis without hemorrhage  Per EGD,on PPI.      Encephalopathy, toxic    Has abnormal head CT ,no acute process,but Prominent ventricular system, may be greater than expected for generalized cerebral volume loss.  Normal pressure hydrocephalus is a consideration,consulted neurology.      Somnolence  As above,  Resolved,back to baseline.      Dysphagia    Patient initially refused GI evaluation,EGD for esophogeal dysmotility.      Finally he is agree with EGD,show only gastritis,      Sepsis   he is septic with fever,leucocytiosis,tachycardia,tachypnea,likely duo to pneumonia.repeated blood culture.on IV Abx,fever is improved,no growth in repeat blood culture.  He is again febrile,blood culture is repeated,picc line remove and send to culture,will follow up with cultures,    CT chest show septic emboli,alreday on broad spectrum IV Abx,  Fever is resolved,leucocytosis is resolved,    Acute respiratory failure with hypoxia  Duo to pneumonia,Require more oxygen supplement,chest X ray show possible aspiration,adjusted Abx,chnaged Rocephin to zosyn,added IS,aspiration precaution,consulted .on puree diet,oxygenation is improving slowly.    Patient refused GI evaluation,EGD for esophogeal dysmotility.then accepted,done,has gastritis,.  Need RA oxygen be checked before DC.    MRSA bacteremia  Blood cultures (+) MRSA  Likely from IV heroin use  Continue Vancomycin  Blood cultures repeated  Echo performed: WNL  Repeat blood culture no growth,consulted [facility] for LTAC placement,he is IV drug user,can not go home.reperat blood  culture show growth, placed picc line.    Afrberile,no place in LTAC yet.  Afebrile.        Pneumonia due to infectious organism  Per clinical presentation and imaging,will continue with IV Abx.no growth,repeat chest x ray show some improvement.      Chronic hepatitis C  Will monitor.      Anemia of chronic disease    HH is drop today with no sign of bleeding,will transfuse pack of RBC.      Intravenous drug abuse  Consult on IV heroin cessation use  Patient states that he quit 3-4 weeks ago      Hyperlipidemia  On statin.      Pancreatitis, chronic  Stable at this  time.      Essential hypertension  Stable at this time with no meds  Will monitor throughout hospital course      VTE Risk Mitigation (From admission, onward)         Ordered     enoxaparin injection 40 mg  Daily      02/22/20 1157     IP VTE HIGH RISK PATIENT  Once      02/22/20 1157     Place DAWIT hose  Until discontinued      02/22/20 1157              Will d/c to home with H/H.           Tyrese Singh MD  Department of Hospital Medicine   [facility]

## 2020-03-08 NOTE — PROGRESS NOTES
SW spoke with pt concerning the cost of a shower chair.  Pt reported that he has a shower chair at home.  Pt will be provided a bedside commode.

## 2020-03-08 NOTE — PLAN OF CARE
Patient is awake alert oriented, argumentative at times; refuses care, VS and glucose testing and meds at times; however more compliant with meds today, refused bath. IV antibiotics as ordered. Medicated for generalized pain with Tramadol as ordered. Hourly rounds, bed alarm on , telesitter in room, free of falls. Continue with plan of care as ordered. No distress noted.

## 2020-03-09 LAB
FINAL PATHOLOGIC DIAGNOSIS: NORMAL
GROSS: NORMAL

## 2020-03-09 PROCEDURE — G0180 MD CERTIFICATION HHA PATIENT: HCPCS | Mod: ,,, | Performed by: INTERNAL MEDICINE

## 2020-03-09 PROCEDURE — G0180 PR HOME HEALTH MD CERTIFICATION: ICD-10-PCS | Mod: ,,, | Performed by: INTERNAL MEDICINE

## 2020-03-09 NOTE — PHYSICIAN QUERY
PT Name: Cali Mabry  MR #: [id number]    Physician Query Form - Respiratory Condition Clarification      CDS/: Gera Gutierrez RN               Contact information:    [email or web address]    This form is a permanent document in the medical record.    Query Date: March 9, 2020    By submitting this query, we are merely seeking further clarification of documentation. Please utilize your independent clinical judgment when addressing the question(s) below.    The Medical record contains the following   Indicators   Supporting Clinical Findings Location in Medical Record      SOB, GUPTA, Wheezing, Productive Cough, Use of Accessory Muscles, etc.     x   Acute/Chronic Illness Sepsis, Somnolence, Dysphagia, MRSA bacteremia, Pneumonia due to infectious organism...   3/4 Progress note- Dr. Villarreal      Radiology Findings     x   Respiratory Distress or Failure Acute respiratory failure with hypoxia--Duo to pneumonia,Require more oxygen supplement....Respiratory: Positive for cough. Negative for apnea and chest tightness.  ...Pulmonary/Chest: Effort normal.    3/4 Progress note- Dr. Villarreal      Hypoxia or Hypercapnia     x   RR         ABGs         O2 sat     2/22  ED:  RR: 40, 32, 36, 26, 33   2/22 ED: sats: % on RA    (No ABG or VBG available) 3/4 Progress note- Dr. Villarreal                  ED clinical summary note  ED clinical summary note      BiPAP/Intubation     x   Supplemental O2 2-3 LNC per respiratory flow sheet  Flow sheet 2/22-3/9      Home O2, Oxygen Dependence     x   Treatment On Zosyn and azithromycin for pneumonia. 2/26 Progress note- Dr. Villarreal      Other       Respiratory failure can be acute, chronic or both. It is generally further specified as hypoxic, hypercapnic or both. Lastly, it is important to identify an etiology, if known or suspected.   References::  https://www.acphospitalist.org/archives/2013/10/coding.htm  http://[employer]/acute-respiratory-failure-know     The clinical guidelines noted below are only system guidelines, and do not replace the providers clinical judgment.    Provider, please specify diagnosis or diagnoses associated with above clinical findings.     Provider, please clarify the respiratory condition associated with the above clinical findings: ( Also, please respond to the POA status below)     [  x] Acute Respiratory Failure with Hypoxia -  ABG pO2 < 60 mmHg or O2 sat of 88% on RA and respiratory symptoms documented   [   ] Acute Respiratory Insufficiency - Generally describes less severe respiratory symptoms and measurements (pO2, SpO2, pH, and pCO2) NOT meeting criteria for respiratory failure     [   ] Acute Respiratory Distress - Generally describes less severe respiratory symptoms (tachypnea, in respiratory distress, increased work of breathing, unable to speak in complete sentences, labored breathing, use of accessory muscles, RR> 24, cyanosis, dyspnea, wheezing, stridor, lethargy) without sufficient measurements (pO2, SpO2, pH, and pCO2) to meet criteria for respiratory failure    [   ] Other Respiratory Diagnosis (please specify): _________________________________   [   ]  Clinically Undetermined     Please provide the POA status of your response: [   ] POA,    [    ] Not POA        Please document in your progress notes daily for the duration of treatment until resolved and include in your discharge summary.

## 2020-03-09 NOTE — PT/OT/SLP DISCHARGE
Physical Therapy Discharge Summary    Name: Cali Mabry  MRN: [id number]   Principal Problem: Diabetic ketoacidosis without coma associated with diabetes mellitus due to underlying condition     Patient Discharged from acute Physical Therapy on 2020.  Please refer to prior PT noted date on 2020 for functional status.     Assessment:     Patient appropriate for care in another setting.    Objective:     GOALS:   Multidisciplinary Problems     Physical Therapy Goals        Problem: Physical Therapy Goal    Goal Priority Disciplines Outcome Goal Variances Interventions   Physical Therapy Goal     PT, PT/OT Ongoing, Progressing     Description:  Goals to be met by: 3/15/2020     Patient will increase functional independence with mobility by performin. Sit to stand transfer with Modified [place]  2. Gait  x 150 feet with Modified [place] with or without AD   3 Lower extremity exercise program x10 reps per handout, with independence                      Reasons for Discontinuation of Therapy Services  Transfer to alternate level of care.      Plan:     Patient Discharged to: Home with Home Health Service.    Eri Vasquez, PT  3/9/2020

## 2020-03-09 NOTE — PHYSICIAN QUERY
"PT Name: Cali Mabry  MR #: [id number]     Physician Query Form - Documentation Clarification      CDS/: Gera Gutierrez RN               Contact information:   [email or web address]    This form is a permanent document in the medical record.     Query Date: March 9, 2020    By submitting this query, we are merely seeking further clarification of documentation. Please utilize your independent clinical judgment when addressing the question(s) below.    The Medical Record reflects the following:    Clinical Findings Location in Medical Record   Constitutional: He is oriented to person, place, and time. No distress.     Staff called for AMS around 6 :40 PM   The pat was seen and examined at bed side.   Cc: " I am not feeling, well" " I have fever"  ...Drowsy, easily arosable, speak in full sentences but slow and at times slurred... Temp: 102.6, HR: 97, BP: 140/68  1) AMS: Drowsy to stuporous,   2) FEVER -The pt was already on Vanc and ZOsyn for  MRSA bacteremia   3) Post transfusion reaction  -Possible; -Will give Benadryl and Recommend steroid one shot after the xray      Encephalopathy, toxic:  Has abnormal head CT ,no acute process,but Prominent ventricular system, may be greater than expected for generalized cerebral volume loss.  Normal pressure hydrocephalus is a consideration,consulted neurology. 2/22, H&P, Dr. Alberto      3/3 Significant Event- Dr. Rene                        3/4 Progress note- Dr. Alberto       Constitutional: He is oriented to person, place, and time.        3/8 Progress note- Hospital medicine- Dr. Dickinson                                                                            Please clarify the diagnosis of __toxic encephalopathy_______________      Provider Use Only    [  ] Diagnosis ruled in and additional clinical support/ decision making indicators for the diagnosis include:   Please specify the toxin :_________________________________         [  ]  Above " stated diagnosis has been ruled out    [ x ]  Above stated diagnosis has been ruled out, other diagnosis ruled in (specify):____infectious encephalopathy________________                               [   ] metabolic encephalopathy       [  ] Other clarification (specify): ______________      [  ] Clinically undetermined                                                                                                            Present on admission (POA) status:   [   ] Yes (Y)                          [  ] Clinically Undetermined (W)  [   ] No (N)                            [   ] Documentation insufficient to determine if condition is POA (U)

## 2020-03-10 NOTE — PT/OT/SLP DISCHARGE
Occupational Therapy Discharge Summary    Cali Mabry  MRN: [id number]   Principal Problem: Diabetic ketoacidosis without coma associated with diabetes mellitus due to underlying condition      Patient Discharged from acute Occupational Therapy on3/8/20*.  Please refer to prior OT notes for functional status.    Assessment:      Patient appropriate for care in another setting.    Objective:     GOALS:   Multidisciplinary Problems     Occupational Therapy Goals        Problem: Occupational Therapy Goal    Goal Priority Disciplines Outcome Interventions   Occupational Therapy Goal     OT, PT/OT Ongoing, Progressing    Description:  Goals to be met by: 3/15/2020    Patient will increase functional independence with ADLs by performing:    Feeding with [place].  UE Dressing with [place].  Grooming while standing at sink with [place] Assistance.  Toileting from toilet with Contact [place] Assistance for hygiene and clothing management.   Supine to sit with Modified [place].  Step transfer with Contact [place] Assistance  Toilet transfer to toilet with Contact Guard Assistance.  Upper extremity exercise program x15 reps per handout, with independence.                       Reasons for Discontinuation of Therapy Services  Transfer to alternate level of care.      Plan:     Patient Discharged to: Home with Home Health Service    Petty Garvey OT  3/10/2020

## 2020-03-11 ENCOUNTER — PATIENT OUTREACH (OUTPATIENT)
Dept: ADMINISTRATIVE | Facility: CLINIC | Age: 55
End: 2020-03-11

## 2020-03-11 DIAGNOSIS — E11.65 UNCONTROLLED TYPE 2 DIABETES MELLITUS WITH HYPERGLYCEMIA: Chronic | ICD-10-CM

## 2020-03-11 DIAGNOSIS — K86.1 CHRONIC PANCREATITIS, UNSPECIFIED PANCREATITIS TYPE: Primary | ICD-10-CM

## 2020-03-11 NOTE — PATIENT INSTRUCTIONS
Diabetes and Your Child: Preventing Diabetic Ketoacidosis (DKA)  Diabetic ketoacidosis (DKA) is a rare but serious condition. It can happen if your childs diabetes is not managed properly. Left untreated, DKA can cause your child to go into a coma. In some cases, it can even cause death. But you can take action to keep your child from having DKA.   Understanding Ketones  The bodys cells need glucose to burn for energy. If glucose is not getting into the cells, the body has to burn fat instead. But burning fat produces a waste product called ketones. Ketones can build up dangerously in the blood and urine. Your childs body cant handle large amounts of ketones.  What Are the Causes of DKA?  When large amounts of ketones build up in the blood, it can cause diabetic ketoacidosis. This means that the chemical balance of the blood is upset. DKA may be more common in children with type 1 diabetes than in children with type 2. The following are the most common causes of DKA:  A lack of needed insulin in your childs blood (this can happen if your child misses his or her insulin shots)  Illness (flu, cold, or infection)  An insulin pump that is broken or not working properly  Insulin that has  or has not been stored properly  What Are the Symptoms of DKA?  Often, symptoms of DKA can look like the flu. Contact your childs healthcare provider or seek emergency care right away if you notice any of these symptoms:  Ketones present in urine or blood (see How to Check for Ketones below)  Nausea  Vomiting  Fruity-smelling breath  Stomach cramps  Very dark urine or no urine in 6 hours  Rapid, deep breathing  Thirst or very dry mouth  Drowsiness or trouble concentrating  When to Check for Ketones  Always check for ketones when your child has any of the above symptoms, or has:  Blood sugar above 240.  Diarrhea or vomiting.  Fever of 100.4°F (38°C) oral or 101.4°F (38.5°C) rectal or higher, or as directed by your childs  healthcare provider  How to Check for Ketones  Ask your childs healthcare provider to show you how to check for ketones at home. Ketone testing is most often done with urine test strips. For a baby or toddler, you can put a cotton ball in your childs diaper to absorb urine. Then, put the moist cotton ball onto a test strip to check for ketones. For older children, follow the directions on the test strip package. Some blood glucose meters may also be used to check for ketones in your childs blood. Ask your childs healthcare provider for more information. If ketones are present in the blood or urine, call your childs healthcare provider right away.   Preventing DKA  DKA can be prevented. The best way to do this is to give your child insulin as directed. Be sure to follow your childs treatment plan as given to you by the healthcare provider. When your childs blood sugar is high, treat him or her right away. Remember that your childs blood sugar can be harder to manage when he or she is sick. To be safe, check your childs blood sugar every 4 hours when he or she is sick. Ask the healthcare provider for sick-day guidelines. This includes learning to adjust your childs insulin dose safely. And always keep a sick-day box available. This box should include:  Ketone strips  Thermometer  Can of soup  Crackers  Juice boxes  Flavored gelatin, such as Jell-O, with and without sugar (these can be kept in the refrigerator)  Frozen juice bars with and without sugar (these should be kept separately in the freezer)  Suppository medication to stop nausea and vomiting, if needed  Be sure to check the expiration dates of everything in the sick-day box once a month. Replace items as needed.    Call your childs healthcare provider right away if your child has:  Vomiting or diarrhea  Fever of 100.4°F (38°C) oral or 101.4°F (38.5°C) rectal or higher, or as directed by your childs healthcare provider  Blood sugar of 240 or  higher that does not lower after your child receives insulin  Blood sugar under 70  Ketones present in blood or urine   Resources  For more information about diabetes, visit these websites:  American Diabetes Association www.diabetes.org  Children with Diabetes www.childrenwithdiabetes.com  Juvenile Diabetes Research Foundation www.jdrf.org  American Association of Diabetes Educators www.aadenet.org  American Association of Clinical Endocrinologists www.aace.com  National Diabetes Information Clearinghouse www.diabetes.niddk.nih.gov    NOTE: This sheet does not give all the information you need to care for your child with diabetes. Ask your childs healthcare provider for more information.   © 6089-3908 [employer], [address] [place] [address], [place] [address]. All rights reserved. This information is not intended as a substitute for professional medical care. Always follow your healthcare professional's instructions.

## 2020-03-17 ENCOUNTER — EXTERNAL HOME HEALTH (OUTPATIENT)
Dept: HOME HEALTH SERVICES | Facility: HOSPITAL | Age: 55
End: 2020-03-17
Payer: MEDICARE

## 2020-03-26 ENCOUNTER — HOSPITAL ENCOUNTER (INPATIENT)
Facility: HOSPITAL | Age: 55
LOS: 27 days | Discharge: NURSING, REHAB OR LONG-TERM CARE | DRG: 871 | End: 2020-04-22
Attending: EMERGENCY MEDICINE | Admitting: INTERNAL MEDICINE
Payer: MEDICARE

## 2020-03-26 DIAGNOSIS — J96.01 ACUTE RESPIRATORY FAILURE WITH HYPOXIA: ICD-10-CM

## 2020-03-26 DIAGNOSIS — I76 SEPTIC EMBOLISM: Primary | ICD-10-CM

## 2020-03-26 DIAGNOSIS — R74.01 TRANSAMINITIS: ICD-10-CM

## 2020-03-26 DIAGNOSIS — E87.1 HYPONATREMIA: ICD-10-CM

## 2020-03-26 DIAGNOSIS — R00.0 SINUS TACHYCARDIA: ICD-10-CM

## 2020-03-26 DIAGNOSIS — A41.9 SEPSIS: ICD-10-CM

## 2020-03-26 DIAGNOSIS — K86.1 CHRONIC PANCREATITIS, UNSPECIFIED PANCREATITIS TYPE: ICD-10-CM

## 2020-03-26 DIAGNOSIS — J86.9 EMPYEMA LUNG: ICD-10-CM

## 2020-03-26 DIAGNOSIS — R10.9 INTRACTABLE ABDOMINAL PAIN: ICD-10-CM

## 2020-03-26 DIAGNOSIS — D75.838 REACTIVE THROMBOCYTOSIS: ICD-10-CM

## 2020-03-26 DIAGNOSIS — K85.90 ACUTE ON CHRONIC PANCREATITIS: ICD-10-CM

## 2020-03-26 DIAGNOSIS — R78.81 BACTEREMIA: ICD-10-CM

## 2020-03-26 DIAGNOSIS — F11.10 OPIOID ABUSE: Chronic | ICD-10-CM

## 2020-03-26 DIAGNOSIS — R07.9 CHEST PAIN: ICD-10-CM

## 2020-03-26 DIAGNOSIS — B18.2 CHRONIC HEPATITIS C WITHOUT HEPATIC COMA: Chronic | ICD-10-CM

## 2020-03-26 DIAGNOSIS — K86.1 ACUTE ON CHRONIC PANCREATITIS: ICD-10-CM

## 2020-03-26 DIAGNOSIS — R63.4 WEIGHT LOSS, UNINTENTIONAL: ICD-10-CM

## 2020-03-26 DIAGNOSIS — D50.9 IRON DEFICIENCY ANEMIA, UNSPECIFIED IRON DEFICIENCY ANEMIA TYPE: Chronic | ICD-10-CM

## 2020-03-26 DIAGNOSIS — N17.9 AKI (ACUTE KIDNEY INJURY): ICD-10-CM

## 2020-03-26 DIAGNOSIS — E87.5 HYPERKALEMIA: ICD-10-CM

## 2020-03-26 LAB
ALBUMIN SERPL BCP-MCNC: 2.7 G/DL (ref 3.5–5.2)
ALLENS TEST: ABNORMAL
ALP SERPL-CCNC: 117 U/L (ref 55–135)
ALT SERPL W/O P-5'-P-CCNC: 10 U/L (ref 10–44)
ANION GAP SERPL CALC-SCNC: 15 MMOL/L (ref 8–16)
AST SERPL-CCNC: 11 U/L (ref 10–40)
B-OH-BUTYR BLD STRIP-SCNC: 0 MMOL/L (ref 0–0.5)
BASOPHILS # BLD AUTO: 0.05 K/UL (ref 0–0.2)
BASOPHILS NFR BLD: 0.2 % (ref 0–1.9)
BILIRUB SERPL-MCNC: 0.3 MG/DL (ref 0.1–1)
BUN SERPL-MCNC: 23 MG/DL (ref 6–20)
CALCIUM SERPL-MCNC: 9.2 MG/DL (ref 8.7–10.5)
CHLORIDE SERPL-SCNC: 97 MMOL/L (ref 95–110)
CO2 SERPL-SCNC: 20 MMOL/L (ref 23–29)
CREAT SERPL-MCNC: 1.5 MG/DL (ref 0.5–1.4)
CRP SERPL-MCNC: 50.3 MG/L (ref 0–8.2)
D DIMER PPP IA.FEU-MCNC: 3.42 MG/L FEU
DELSYS: ABNORMAL
DIFFERENTIAL METHOD: ABNORMAL
EOSINOPHIL # BLD AUTO: 0 K/UL (ref 0–0.5)
EOSINOPHIL NFR BLD: 0.1 % (ref 0–8)
ERYTHROCYTE [DISTWIDTH] IN BLOOD BY AUTOMATED COUNT: 17.6 % (ref 11.5–14.5)
EST. GFR  (AFRICAN AMERICAN): >60 ML/MIN/1.73 M^2
EST. GFR  (NON AFRICAN AMERICAN): 52 ML/MIN/1.73 M^2
GLUCOSE SERPL-MCNC: 180 MG/DL (ref 70–110)
HCO3 UR-SCNC: 25.4 MMOL/L (ref 24–28)
HCT VFR BLD AUTO: 32.9 % (ref 40–54)
HGB BLD-MCNC: 10 G/DL (ref 14–18)
IMM GRANULOCYTES # BLD AUTO: 0.21 K/UL (ref 0–0.04)
IMM GRANULOCYTES NFR BLD AUTO: 1 % (ref 0–0.5)
LACTATE SERPL-SCNC: 2.1 MMOL/L (ref 0.5–2.2)
LIPASE SERPL-CCNC: 55 U/L (ref 4–60)
LYMPHOCYTES # BLD AUTO: 2.4 K/UL (ref 1–4.8)
LYMPHOCYTES NFR BLD: 11.9 % (ref 18–48)
MAGNESIUM SERPL-MCNC: 2 MG/DL (ref 1.6–2.6)
MCH RBC QN AUTO: 25.5 PG (ref 27–31)
MCHC RBC AUTO-ENTMCNC: 30.4 G/DL (ref 32–36)
MCV RBC AUTO: 84 FL (ref 82–98)
MODE: ABNORMAL
MONOCYTES # BLD AUTO: 0.9 K/UL (ref 0.3–1)
MONOCYTES NFR BLD: 4.6 % (ref 4–15)
NEUTROPHILS # BLD AUTO: 16.6 K/UL (ref 1.8–7.7)
NEUTROPHILS NFR BLD: 82.2 % (ref 38–73)
NRBC BLD-RTO: 0 /100 WBC
PCO2 BLDA: 42.7 MMHG (ref 35–45)
PH SMN: 7.38 [PH] (ref 7.35–7.45)
PHOSPHATE SERPL-MCNC: 4.7 MG/DL (ref 2.7–4.5)
PLATELET # BLD AUTO: 473 K/UL (ref 150–350)
PMV BLD AUTO: 9.2 FL (ref 9.2–12.9)
PO2 BLDA: 60 MMHG (ref 40–60)
POC BE: 0 MMOL/L
POC SATURATED O2: 90 % (ref 95–100)
POC TCO2: 27 MMOL/L (ref 24–29)
POCT GLUCOSE: 172 MG/DL (ref 70–110)
POTASSIUM SERPL-SCNC: 4.4 MMOL/L (ref 3.5–5.1)
PROCALCITONIN SERPL IA-MCNC: 10.09 NG/ML
PROT SERPL-MCNC: 10 G/DL (ref 6–8.4)
RBC # BLD AUTO: 3.92 M/UL (ref 4.6–6.2)
SAMPLE: ABNORMAL
SITE: ABNORMAL
SODIUM SERPL-SCNC: 132 MMOL/L (ref 136–145)
TROPONIN I SERPL DL<=0.01 NG/ML-MCNC: <0.006 NG/ML (ref 0–0.03)
WBC # BLD AUTO: 20.17 K/UL (ref 3.9–12.7)

## 2020-03-26 PROCEDURE — 83605 ASSAY OF LACTIC ACID: CPT

## 2020-03-26 PROCEDURE — 63600175 PHARM REV CODE 636 W HCPCS: Performed by: EMERGENCY MEDICINE

## 2020-03-26 PROCEDURE — 82803 BLOOD GASES ANY COMBINATION: CPT

## 2020-03-26 PROCEDURE — 87040 BLOOD CULTURE FOR BACTERIA: CPT

## 2020-03-26 PROCEDURE — 99900035 HC TECH TIME PER 15 MIN (STAT)

## 2020-03-26 PROCEDURE — 12000002 HC ACUTE/MED SURGE SEMI-PRIVATE ROOM

## 2020-03-26 PROCEDURE — U0002 COVID-19 LAB TEST NON-CDC: HCPCS

## 2020-03-26 PROCEDURE — 86140 C-REACTIVE PROTEIN: CPT

## 2020-03-26 PROCEDURE — 93010 EKG 12-LEAD: ICD-10-PCS | Mod: ,,, | Performed by: INTERNAL MEDICINE

## 2020-03-26 PROCEDURE — 84145 PROCALCITONIN (PCT): CPT

## 2020-03-26 PROCEDURE — 25000003 PHARM REV CODE 250: Performed by: EMERGENCY MEDICINE

## 2020-03-26 PROCEDURE — 83690 ASSAY OF LIPASE: CPT

## 2020-03-26 PROCEDURE — 84100 ASSAY OF PHOSPHORUS: CPT

## 2020-03-26 PROCEDURE — 80053 COMPREHEN METABOLIC PANEL: CPT

## 2020-03-26 PROCEDURE — 82010 KETONE BODYS QUAN: CPT

## 2020-03-26 PROCEDURE — 93010 ELECTROCARDIOGRAM REPORT: CPT | Mod: ,,, | Performed by: INTERNAL MEDICINE

## 2020-03-26 PROCEDURE — 83735 ASSAY OF MAGNESIUM: CPT

## 2020-03-26 PROCEDURE — 85379 FIBRIN DEGRADATION QUANT: CPT

## 2020-03-26 PROCEDURE — 93005 ELECTROCARDIOGRAM TRACING: CPT

## 2020-03-26 PROCEDURE — 85025 COMPLETE CBC W/AUTO DIFF WBC: CPT

## 2020-03-26 PROCEDURE — 84484 ASSAY OF TROPONIN QUANT: CPT

## 2020-03-26 RX ORDER — MORPHINE SULFATE 10 MG/ML
4 INJECTION INTRAMUSCULAR; INTRAVENOUS; SUBCUTANEOUS
Status: COMPLETED | OUTPATIENT
Start: 2020-03-26 | End: 2020-03-26

## 2020-03-26 RX ADMIN — VANCOMYCIN HYDROCHLORIDE 1250 MG: 1.25 INJECTION, POWDER, LYOPHILIZED, FOR SOLUTION INTRAVENOUS at 10:03

## 2020-03-26 RX ADMIN — SODIUM CHLORIDE 1000 ML: 0.9 INJECTION, SOLUTION INTRAVENOUS at 09:03

## 2020-03-26 RX ADMIN — MORPHINE SULFATE 4 MG: 10 INJECTION INTRAVENOUS at 09:03

## 2020-03-26 RX ADMIN — IOHEXOL 75 ML: 350 INJECTION, SOLUTION INTRAVENOUS at 11:03

## 2020-03-27 PROBLEM — I76 SEPTIC EMBOLISM: Status: ACTIVE | Noted: 2020-03-27

## 2020-03-27 LAB
ALBUMIN SERPL BCP-MCNC: 2.2 G/DL (ref 3.5–5.2)
ALP SERPL-CCNC: 97 U/L (ref 55–135)
ALT SERPL W/O P-5'-P-CCNC: 10 U/L (ref 10–44)
ANION GAP SERPL CALC-SCNC: 11 MMOL/L (ref 8–16)
AST SERPL-CCNC: 18 U/L (ref 10–40)
BACTERIA #/AREA URNS HPF: NORMAL /HPF
BASOPHILS # BLD AUTO: 0.02 K/UL (ref 0–0.2)
BASOPHILS NFR BLD: 0.2 % (ref 0–1.9)
BILIRUB SERPL-MCNC: 0.7 MG/DL (ref 0.1–1)
BILIRUB UR QL STRIP: NEGATIVE
BNP SERPL-MCNC: 69 PG/ML (ref 0–99)
BUN SERPL-MCNC: 17 MG/DL (ref 6–20)
CALCIUM SERPL-MCNC: 8.1 MG/DL (ref 8.7–10.5)
CHLORIDE SERPL-SCNC: 97 MMOL/L (ref 95–110)
CK SERPL-CCNC: 65 U/L (ref 20–200)
CLARITY UR: CLEAR
CO2 SERPL-SCNC: 23 MMOL/L (ref 23–29)
COLOR UR: YELLOW
CREAT SERPL-MCNC: 1.1 MG/DL (ref 0.5–1.4)
DIFFERENTIAL METHOD: ABNORMAL
EOSINOPHIL # BLD AUTO: 0 K/UL (ref 0–0.5)
EOSINOPHIL NFR BLD: 0 % (ref 0–8)
ERYTHROCYTE [DISTWIDTH] IN BLOOD BY AUTOMATED COUNT: 17.7 % (ref 11.5–14.5)
ERYTHROCYTE [SEDIMENTATION RATE] IN BLOOD BY WESTERGREN METHOD: 72 MM/HR (ref 0–23)
EST. GFR  (AFRICAN AMERICAN): >60 ML/MIN/1.73 M^2
EST. GFR  (NON AFRICAN AMERICAN): >60 ML/MIN/1.73 M^2
FERRITIN SERPL-MCNC: 265 NG/ML (ref 20–300)
GLUCOSE SERPL-MCNC: 232 MG/DL (ref 70–110)
GLUCOSE UR QL STRIP: NEGATIVE
HCT VFR BLD AUTO: 28.2 % (ref 40–54)
HGB BLD-MCNC: 8.5 G/DL (ref 14–18)
HGB UR QL STRIP: ABNORMAL
HYALINE CASTS #/AREA URNS LPF: 0 /LPF
IMM GRANULOCYTES # BLD AUTO: 0.08 K/UL (ref 0–0.04)
IMM GRANULOCYTES NFR BLD AUTO: 0.7 % (ref 0–0.5)
INFLUENZA A, MOLECULAR: NEGATIVE
INFLUENZA B, MOLECULAR: NEGATIVE
KETONES UR QL STRIP: NEGATIVE
LACTATE SERPL-SCNC: 2.6 MMOL/L (ref 0.5–2.2)
LDH SERPL L TO P-CCNC: 257 U/L (ref 110–260)
LEUKOCYTE ESTERASE UR QL STRIP: NEGATIVE
LIPASE SERPL-CCNC: 24 U/L (ref 4–60)
LYMPHOCYTES # BLD AUTO: 1.5 K/UL (ref 1–4.8)
LYMPHOCYTES NFR BLD: 13.6 % (ref 18–48)
MAGNESIUM SERPL-MCNC: 1.8 MG/DL (ref 1.6–2.6)
MCH RBC QN AUTO: 25.7 PG (ref 27–31)
MCHC RBC AUTO-ENTMCNC: 30.1 G/DL (ref 32–36)
MCV RBC AUTO: 85 FL (ref 82–98)
MICROSCOPIC COMMENT: NORMAL
MONOCYTES # BLD AUTO: 0.7 K/UL (ref 0.3–1)
MONOCYTES NFR BLD: 6.2 % (ref 4–15)
NEUTROPHILS # BLD AUTO: 9 K/UL (ref 1.8–7.7)
NEUTROPHILS NFR BLD: 79.3 % (ref 38–73)
NITRITE UR QL STRIP: NEGATIVE
NRBC BLD-RTO: 0 /100 WBC
PH UR STRIP: 5 [PH] (ref 5–8)
PHOSPHATE SERPL-MCNC: 2.8 MG/DL (ref 2.7–4.5)
PLATELET # BLD AUTO: 412 K/UL (ref 150–350)
PMV BLD AUTO: 9.5 FL (ref 9.2–12.9)
POCT GLUCOSE: 155 MG/DL (ref 70–110)
POCT GLUCOSE: 275 MG/DL (ref 70–110)
POCT GLUCOSE: 302 MG/DL (ref 70–110)
POTASSIUM SERPL-SCNC: 5 MMOL/L (ref 3.5–5.1)
PROT SERPL-MCNC: 9 G/DL (ref 6–8.4)
PROT UR QL STRIP: ABNORMAL
RBC # BLD AUTO: 3.31 M/UL (ref 4.6–6.2)
RBC #/AREA URNS HPF: 0 /HPF (ref 0–4)
SODIUM SERPL-SCNC: 131 MMOL/L (ref 136–145)
SP GR UR STRIP: >1.06 (ref 1–1.03)
SPECIMEN SOURCE: NORMAL
URN SPEC COLLECT METH UR: ABNORMAL
UROBILINOGEN UR STRIP-ACNC: NEGATIVE EU/DL
VANCOMYCIN SERPL-MCNC: 10.6 UG/ML
WBC # BLD AUTO: 11.36 K/UL (ref 3.9–12.7)
WBC #/AREA URNS HPF: 0 /HPF (ref 0–5)

## 2020-03-27 PROCEDURE — 85025 COMPLETE CBC W/AUTO DIFF WBC: CPT

## 2020-03-27 PROCEDURE — 82962 GLUCOSE BLOOD TEST: CPT

## 2020-03-27 PROCEDURE — 87502 INFLUENZA DNA AMP PROBE: CPT

## 2020-03-27 PROCEDURE — 27000221 HC OXYGEN, UP TO 24 HOURS

## 2020-03-27 PROCEDURE — 63600175 PHARM REV CODE 636 W HCPCS: Performed by: PHYSICIAN ASSISTANT

## 2020-03-27 PROCEDURE — 94640 AIRWAY INHALATION TREATMENT: CPT

## 2020-03-27 PROCEDURE — 63600175 PHARM REV CODE 636 W HCPCS: Performed by: EMERGENCY MEDICINE

## 2020-03-27 PROCEDURE — 81000 URINALYSIS NONAUTO W/SCOPE: CPT

## 2020-03-27 PROCEDURE — 82550 ASSAY OF CK (CPK): CPT

## 2020-03-27 PROCEDURE — 84100 ASSAY OF PHOSPHORUS: CPT

## 2020-03-27 PROCEDURE — 96375 TX/PRO/DX INJ NEW DRUG ADDON: CPT

## 2020-03-27 PROCEDURE — 63600175 PHARM REV CODE 636 W HCPCS: Performed by: INTERNAL MEDICINE

## 2020-03-27 PROCEDURE — 96361 HYDRATE IV INFUSION ADD-ON: CPT

## 2020-03-27 PROCEDURE — 82728 ASSAY OF FERRITIN: CPT

## 2020-03-27 PROCEDURE — 96365 THER/PROPH/DIAG IV INF INIT: CPT

## 2020-03-27 PROCEDURE — 83690 ASSAY OF LIPASE: CPT

## 2020-03-27 PROCEDURE — 94761 N-INVAS EAR/PLS OXIMETRY MLT: CPT

## 2020-03-27 PROCEDURE — 83615 LACTATE (LD) (LDH) ENZYME: CPT

## 2020-03-27 PROCEDURE — 11000001 HC ACUTE MED/SURG PRIVATE ROOM

## 2020-03-27 PROCEDURE — 96366 THER/PROPH/DIAG IV INF ADDON: CPT

## 2020-03-27 PROCEDURE — 85652 RBC SED RATE AUTOMATED: CPT

## 2020-03-27 PROCEDURE — 25000003 PHARM REV CODE 250: Performed by: EMERGENCY MEDICINE

## 2020-03-27 PROCEDURE — 25000242 PHARM REV CODE 250 ALT 637 W/ HCPCS: Performed by: PHYSICIAN ASSISTANT

## 2020-03-27 PROCEDURE — 80202 ASSAY OF VANCOMYCIN: CPT

## 2020-03-27 PROCEDURE — 83735 ASSAY OF MAGNESIUM: CPT

## 2020-03-27 PROCEDURE — 83605 ASSAY OF LACTIC ACID: CPT

## 2020-03-27 PROCEDURE — C9399 UNCLASSIFIED DRUGS OR BIOLOG: HCPCS | Performed by: PHYSICIAN ASSISTANT

## 2020-03-27 PROCEDURE — 83880 ASSAY OF NATRIURETIC PEPTIDE: CPT

## 2020-03-27 PROCEDURE — 25000003 PHARM REV CODE 250: Performed by: PHYSICIAN ASSISTANT

## 2020-03-27 PROCEDURE — 80053 COMPREHEN METABOLIC PANEL: CPT

## 2020-03-27 PROCEDURE — 25500020 PHARM REV CODE 255: Performed by: EMERGENCY MEDICINE

## 2020-03-27 PROCEDURE — 99291 CRITICAL CARE FIRST HOUR: CPT | Mod: 25

## 2020-03-27 PROCEDURE — 87449 NOS EACH ORGANISM AG IA: CPT

## 2020-03-27 RX ORDER — INSULIN LISPRO 100 [IU]/ML
INJECTION, SOLUTION INTRAVENOUS; SUBCUTANEOUS
Status: ON HOLD | COMMUNITY
End: 2020-04-02 | Stop reason: SDUPTHER

## 2020-03-27 RX ORDER — MORPHINE SULFATE 10 MG/ML
4 INJECTION INTRAMUSCULAR; INTRAVENOUS; SUBCUTANEOUS
Status: DISCONTINUED | OUTPATIENT
Start: 2020-03-27 | End: 2020-03-27

## 2020-03-27 RX ORDER — LEVOFLOXACIN 5 MG/ML
750 INJECTION, SOLUTION INTRAVENOUS ONCE
Status: DISCONTINUED | OUTPATIENT
Start: 2020-03-27 | End: 2020-03-27

## 2020-03-27 RX ORDER — ONDANSETRON 8 MG/1
8 TABLET, ORALLY DISINTEGRATING ORAL EVERY 8 HOURS PRN
Status: DISCONTINUED | OUTPATIENT
Start: 2020-03-27 | End: 2020-04-22 | Stop reason: HOSPADM

## 2020-03-27 RX ORDER — SODIUM CHLORIDE 0.9 % (FLUSH) 0.9 %
10 SYRINGE (ML) INJECTION
Status: DISCONTINUED | OUTPATIENT
Start: 2020-03-27 | End: 2020-04-22 | Stop reason: HOSPADM

## 2020-03-27 RX ORDER — ALBUTEROL SULFATE 90 UG/1
2 AEROSOL, METERED RESPIRATORY (INHALATION) EVERY 8 HOURS
Status: DISCONTINUED | OUTPATIENT
Start: 2020-03-27 | End: 2020-03-28

## 2020-03-27 RX ORDER — ACETAMINOPHEN 325 MG/1
650 TABLET ORAL EVERY 8 HOURS PRN
Status: DISCONTINUED | OUTPATIENT
Start: 2020-03-27 | End: 2020-04-22 | Stop reason: HOSPADM

## 2020-03-27 RX ORDER — MORPHINE SULFATE 4 MG/ML
4 INJECTION, SOLUTION INTRAMUSCULAR; INTRAVENOUS
Status: DISCONTINUED | OUTPATIENT
Start: 2020-03-27 | End: 2020-03-28

## 2020-03-27 RX ORDER — TALC
6 POWDER (GRAM) TOPICAL NIGHTLY PRN
Status: DISCONTINUED | OUTPATIENT
Start: 2020-03-27 | End: 2020-04-22 | Stop reason: HOSPADM

## 2020-03-27 RX ORDER — MORPHINE SULFATE 4 MG/ML
4 INJECTION, SOLUTION INTRAMUSCULAR; INTRAVENOUS
Status: COMPLETED | OUTPATIENT
Start: 2020-03-27 | End: 2020-03-27

## 2020-03-27 RX ORDER — INSULIN ASPART 100 [IU]/ML
5 INJECTION, SOLUTION INTRAVENOUS; SUBCUTANEOUS
Status: DISCONTINUED | OUTPATIENT
Start: 2020-03-27 | End: 2020-03-28

## 2020-03-27 RX ORDER — IBUPROFEN 200 MG
24 TABLET ORAL
Status: DISCONTINUED | OUTPATIENT
Start: 2020-03-27 | End: 2020-03-27

## 2020-03-27 RX ORDER — INSULIN ASPART 100 [IU]/ML
1-10 INJECTION, SOLUTION INTRAVENOUS; SUBCUTANEOUS
Status: DISCONTINUED | OUTPATIENT
Start: 2020-03-27 | End: 2020-03-30

## 2020-03-27 RX ORDER — GLUCAGON 1 MG
1 KIT INJECTION
Status: DISCONTINUED | OUTPATIENT
Start: 2020-03-27 | End: 2020-03-27

## 2020-03-27 RX ORDER — IBUPROFEN 200 MG
24 TABLET ORAL
Status: DISCONTINUED | OUTPATIENT
Start: 2020-03-27 | End: 2020-03-30

## 2020-03-27 RX ORDER — VANCOMYCIN HCL IN 5 % DEXTROSE 1G/250ML
15 PLASTIC BAG, INJECTION (ML) INTRAVENOUS
Status: DISCONTINUED | OUTPATIENT
Start: 2020-03-27 | End: 2020-03-28

## 2020-03-27 RX ORDER — GLUCAGON 1 MG
1 KIT INJECTION
Status: DISCONTINUED | OUTPATIENT
Start: 2020-03-27 | End: 2020-03-30

## 2020-03-27 RX ORDER — ONDANSETRON 2 MG/ML
4 INJECTION INTRAMUSCULAR; INTRAVENOUS EVERY 8 HOURS PRN
Status: DISCONTINUED | OUTPATIENT
Start: 2020-03-27 | End: 2020-04-22 | Stop reason: HOSPADM

## 2020-03-27 RX ORDER — IBUPROFEN 200 MG
16 TABLET ORAL
Status: DISCONTINUED | OUTPATIENT
Start: 2020-03-27 | End: 2020-03-27

## 2020-03-27 RX ORDER — LOPERAMIDE HYDROCHLORIDE 2 MG/1
2 CAPSULE ORAL EVERY 6 HOURS PRN
Status: DISCONTINUED | OUTPATIENT
Start: 2020-03-27 | End: 2020-04-22 | Stop reason: HOSPADM

## 2020-03-27 RX ORDER — GUAIFENESIN/DEXTROMETHORPHAN 100-10MG/5
10 SYRUP ORAL EVERY 4 HOURS PRN
Status: DISCONTINUED | OUTPATIENT
Start: 2020-03-27 | End: 2020-04-22 | Stop reason: HOSPADM

## 2020-03-27 RX ORDER — ENOXAPARIN SODIUM 100 MG/ML
40 INJECTION SUBCUTANEOUS EVERY 24 HOURS
Status: DISCONTINUED | OUTPATIENT
Start: 2020-03-27 | End: 2020-04-03

## 2020-03-27 RX ORDER — IBUPROFEN 200 MG
16 TABLET ORAL
Status: DISCONTINUED | OUTPATIENT
Start: 2020-03-27 | End: 2020-03-30

## 2020-03-27 RX ORDER — SODIUM CHLORIDE 0.9 % (FLUSH) 0.9 %
10 SYRINGE (ML) INJECTION
Status: DISCONTINUED | OUTPATIENT
Start: 2020-03-27 | End: 2020-03-27

## 2020-03-27 RX ORDER — ATORVASTATIN CALCIUM 20 MG/1
20 TABLET, FILM COATED ORAL DAILY
Status: ON HOLD | COMMUNITY
End: 2022-09-20 | Stop reason: SDUPTHER

## 2020-03-27 RX ADMIN — PIPERACILLIN AND TAZOBACTAM 4.5 G: 4; .5 INJECTION, POWDER, LYOPHILIZED, FOR SOLUTION INTRAVENOUS; PARENTERAL at 09:03

## 2020-03-27 RX ADMIN — MORPHINE SULFATE 4 MG: 4 INJECTION INTRAVENOUS at 06:03

## 2020-03-27 RX ADMIN — PIPERACILLIN AND TAZOBACTAM 4.5 G: 4; .5 INJECTION, POWDER, LYOPHILIZED, FOR SOLUTION INTRAVENOUS; PARENTERAL at 11:03

## 2020-03-27 RX ADMIN — PIPERACILLIN AND TAZOBACTAM 4.5 G: 4; .5 INJECTION, POWDER, LYOPHILIZED, FOR SOLUTION INTRAVENOUS; PARENTERAL at 12:03

## 2020-03-27 RX ADMIN — MORPHINE SULFATE 4 MG: 4 INJECTION INTRAVENOUS at 02:03

## 2020-03-27 RX ADMIN — ENOXAPARIN SODIUM 40 MG: 100 INJECTION SUBCUTANEOUS at 09:03

## 2020-03-27 RX ADMIN — ACETAMINOPHEN 650 MG: 325 TABLET ORAL at 02:03

## 2020-03-27 RX ADMIN — INSULIN DETEMIR 15 UNITS: 100 INJECTION, SOLUTION SUBCUTANEOUS at 01:03

## 2020-03-27 RX ADMIN — MORPHINE SULFATE 4 MG: 4 INJECTION, SOLUTION INTRAMUSCULAR; INTRAVENOUS at 09:03

## 2020-03-27 RX ADMIN — INSULIN ASPART 5 UNITS: 100 INJECTION, SOLUTION INTRAVENOUS; SUBCUTANEOUS at 05:03

## 2020-03-27 RX ADMIN — MORPHINE SULFATE 4 MG: 4 INJECTION INTRAVENOUS at 09:03

## 2020-03-27 RX ADMIN — PIPERACILLIN AND TAZOBACTAM 4.5 G: 4; .5 INJECTION, POWDER, LYOPHILIZED, FOR SOLUTION INTRAVENOUS; PARENTERAL at 02:03

## 2020-03-27 RX ADMIN — VANCOMYCIN HYDROCHLORIDE 1000 MG: 1 INJECTION, POWDER, LYOPHILIZED, FOR SOLUTION INTRAVENOUS at 12:03

## 2020-03-27 RX ADMIN — ALBUTEROL SULFATE 2 PUFF: 90 AEROSOL, METERED RESPIRATORY (INHALATION) at 03:03

## 2020-03-27 RX ADMIN — INSULIN ASPART 1 UNITS: 100 INJECTION, SOLUTION INTRAVENOUS; SUBCUTANEOUS at 09:03

## 2020-03-27 RX ADMIN — MORPHINE SULFATE 4 MG: 10 INJECTION INTRAVENOUS at 04:03

## 2020-03-27 RX ADMIN — MORPHINE SULFATE 4 MG: 10 INJECTION INTRAVENOUS at 08:03

## 2020-03-27 RX ADMIN — SODIUM CHLORIDE 1100 ML: 0.9 INJECTION, SOLUTION INTRAVENOUS at 05:03

## 2020-03-27 NOTE — PLAN OF CARE
CM gathered information from chart. Patient set up with [facility] last admission will assess patient current disposition with  agency. Patient to be discharge home once medically stable       PHARM:   [id number] - [place] - [address]  [address] [place] [address]  Phone: [phone] Fax: [phone]      Payor: [facility] MCARE / Plan: [facility] DUAL COMPLETE HMO SNP / Product Type: Medicare Advantage /        03/27/20 1528   Discharge Assessment   Assessment Type Discharge Planning Assessment   Confirmed/corrected address and phone number on facesheet? Yes   Assessment information obtained from? Medical Record   Expected Length of Stay (days) 4   Communicated expected length of stay with patient/caregiver no   Prior to hospitilization cognitive status: Alert/Oriented   Prior to hospitalization functional status: Assistive Equipment   Current cognitive status: Alert/Oriented   Current Functional Status: Assistive Equipment   Lives With alone   Able to Return to Prior Arrangements other (see comments)  (TBD)   Is patient able to care for self after discharge? No   Who are your caregiver(s) and their phone number(s)? Amarilis Easley [phone]    Patient's perception of discharge disposition home health   Patient currently being followed by outpatient case management? No   Patient currently receives any other outside agency services? No   Equipment Currently Used at Home cane, straight   Do you have any problems affording any of your prescribed medications? No   Is the patient taking medications as prescribed? yes   Does the patient have transportation home? Yes   Transportation Anticipated family or friend will provide   Does the patient receive services at [facility]? No   Discharge Plan A Home;Home Health   Discharge Plan B Home;Home with family   DME Needed Upon Discharge  none   Patient/Family in Agreement with Plan unable to assess     Hedy  Valente RN, BSN     Ext [phone]

## 2020-03-27 NOTE — ED NOTES
"Pt repeatedly asking for pain meds despite the fact that he was informed he had received them too recently to receive more. Pt complaining that he "can't eat, I'm in too much pain."     Explained that he will be able to request more pain meds when he moves up to his bed in the MedSurg unit  "

## 2020-03-27 NOTE — H&P
"Hospital Medicine  History and Physical  [facility]      Patient Name: Cali Mabry  MRN:  [id number]  Hospital Medicine Team: [facility] [place] [facility] A Boyd Gray PA-C  Date of Admission:  3/26/2020     Length of Stay:  LOS: 0 days     Principal Problem: Suspected Covid-19 Virus Infection    Chief complaint    Chest pain and shortness of breath    HPI    Cali Mabry is a 54 y.o. with DM II, hepatitis C, HTN, polysubstance abuse/IVDU, pancreatitis who presents, after transfer from [facility], with a 2 day history of chest pain and shortness of breath.  Chest pain is centralized and just described as "painful." Pain aggravated with movement and deep breathing. He reports an intermittent cough that is non-productive. He denies sick contacts, fever, chills, sore throat. Upon questioning, patient stated "I have the virus so just treat me for it." Educated patient that testing is still in process. Inquired about IV drug use however he was unable to recall when he last used. Patient swatted examiner's hand after chest palpation, yelling "that hurts!" Patient became aggravated, reported his pain was now worse and requested pain medication. Per nurse, patient given PRN dose an hour prior. Patient encouraged to continue discussing his plan of care however stated, "I don't want to chat."    ED: Afebrile with leukocytosis. CTA chest from 3/26 with dense consolidative changes within the right lower lobe with internal cavitation containing air and fluid; Occlusion is seen of the right lower lobe bronchi which may be secondary to mucoid impaction or aspirated material; Additional multifocal opacities and cavitations.  Vancomycin and Zosyn administered. On 2L NC. VSS. Admitted to hospital medicine for further evaluation and COVID-19 rule out.     Review of Systems    Constitutional: Negative for fever, chills, fatigue, poor appetite   HENT: Negative for sore throat, negative for trouble swallowing.  "   Eyes: Negative for photophobia, visual disturbance.   Respiratory: Positive for cough, shortness of breath  Cardiovascular: Positive for chest pain. Negative for palpitations, leg swelling.   Gastrointestinal: Positive Negative for abdominal pain, constipation, nausea, vomiting.   Endocrine: Negative for cold intolerance, heat intolerance.   Genitourinary: Negative for dysuria, frequency.   Musculoskeletal: Negative for arthralgias, myalgias.   Skin: Negative for rash  Neurological: Negative for dizziness, syncope, light-headedness.   Psychiatric/Behavioral: Negative for confusion, hallucinations, anxiety    Past Medical History:   Diagnosis Date    Diabetes mellitus     Hepatitis     HTN (hypertension)     Pancreatitis      Past Surgical History:   Procedure Laterality Date    CARDIAC SURGERY  1999    stent placed. ([facility])    ESOPHAGOGASTRODUODENOSCOPY N/A 3/5/2020    Procedure: EGD (ESOPHAGOGASTRODUODENOSCOPY);  Surgeon: Brinda Rene MD;  Location: [facility];  Service: Endoscopy;  Laterality: N/A;  W421 A; [phone]    SHOULDER SURGERY  2013    right shoulder, spur removal.     Family History   Problem Relation Age of Onset    Asthma Mother      Social History     Socioeconomic History    Marital status: Single     Spouse name: Not on file    Number of children: Not on file    Years of education: Not on file    Highest education level: Not on file   Occupational History    Not on file   Social Needs    Financial resource strain: Not on file    Food insecurity:     Worry: Not on file     Inability: Not on file    Transportation needs:     Medical: Not on file     Non-medical: Not on file   Tobacco Use    Smoking status: Current Every Day Smoker     Packs/day: 1.00     Years: 20.00     Pack years: 20.00     Types: Cigarettes     Start date: 4/3/1981    Smokeless tobacco: Never Used   Substance and Sexual Activity    Alcohol use: No     Alcohol/week: 0.0 standard drinks    Drug use:  Yes     Types: IV, Marijuana, Heroin     Comment: currently, used today, herion    Sexual activity: Yes     Partners: Female   Lifestyle    Physical activity:     Days per week: Not on file     Minutes per session: Not on file    Stress: Not on file   Relationships    Social connections:     Talks on phone: Not on file     Gets together: Not on file     Attends [employer] service: Not on file     Active member of club or organization: Not on file     Attends meetings of clubs or organizations: Not on file     Relationship status: Not on file   Other Topics Concern    Not on file   Social History Narrative    Not on file       Medications  No current facility-administered medications on file prior to encounter.      Current Outpatient Medications on File Prior to Encounter   Medication Sig Dispense Refill    atorvastatin (LIPITOR) 40 MG tablet Take 40 mg by mouth once daily.      gabapentin (NEURONTIN) 300 MG capsule Take 300 mg by mouth.      ibuprofen (ADVIL,MOTRIN) 600 MG tablet Take 1 tablet (600 mg total) by mouth every 6 (six) hours as needed for Pain. (Patient not taking: Reported on 3/11/2020) 20 tablet 0    insulin aspart U-100 (NOVOLOG) 100 unit/mL (3 mL) InPn pen Inject 10 Units into the skin.      LANTUS 100 unit/mL injection Inject 15 Units into the skin 2 (two) times daily. (Patient taking differently: Inject 30 Units into the skin once daily. ) 9 mL 0    naloxone (NARCAN) 1 mg/mL injection 2 mg (1 mg per nostril) by Nasal route as needed for opioid overdose; may repeat in 3 to 5 minutes if not effective. Call 911 (Patient not taking: Reported on 3/11/2020) 2 mL 11    naloxone (NARCAN) 4 mg/actuation Spry 4mg by nasal route as needed for opioid overdose; may repeat every 2-3 minutes in alternating nostrils until medical help arrives. Call 911 (Patient not taking: Reported on 3/11/2020) 1 each 11    nicotine (NICODERM CQ) 21 mg/24 hr Place 1 patch onto the skin once daily. (Patient not  taking: Reported on 3/11/2020)  0    omeprazole (PRILOSEC) 40 MG capsule Take 1 capsule (40 mg total) by mouth once daily. 30 capsule 11    oxyCODONE-acetaminophen (PERCOCET) 5-325 mg per tablet Take 1 tablet by mouth every 4 (four) hours as needed for Pain. 15 tablet 0       Allergies  Patient has no known allergies.    Physical Examination  Temp:  [97.8 °F (36.6 °C)-98.7 °F (37.1 °C)]   Pulse:  []   Resp:  [19-32]   BP: (105-147)/(56-79)   SpO2:  [90 %-100 %]     Gen: NAD, conversant  Head: NC, AT  Eyes: PERRLA, EOMI  Throat: MMM, OP clear  CV: RRR, no M/R/G, no peripheral edema, no JVD  Resp: coarse bilateral breath sounds, no increased work of breathing on **  GI: Soft, NT, ND, +BS  Ext: MAEW, no c/c/e  Neuro: AAOx3, CN grossly intact, no focal neurologic deficits  Psychiatry: Normal mood, normal affect    Laboratory:  Recent Labs   Lab 03/26/20 2058   WBC 20.17*   LYMPH 11.9*  2.4   HGB 10.0*   HCT 32.9*   *     Recent Labs   Lab 03/26/20 2058   *   K 4.4   CL 97   CO2 20*   BUN 23*   CREATININE 1.5*   *   CALCIUM 9.2   MG 2.0   PHOS 4.7*     Recent Labs   Lab 03/26/20 2058   ALKPHOS 117   ALT 10   AST 11   ALBUMIN 2.7*   PROT 10.0*   BILITOT 0.3      Recent Labs     03/26/20 2058 03/26/20 2156 03/27/20  0220   DDIMER  --   --  3.42*  --    CRP 50.3*  --   --   --    TROPONINI <0.006  --   --   --    LACTATE  --    < > 2.1 2.6*    < > = values in this interval not displayed.       All labs within the last 24 hours were reviewed.     Microbiology:  No results found for: [id number]    Microbiology Results (last 7 days)     Procedure Component Value Units Date/Time    Culture, Respiratory with Gram Stain [[id number]]     Order Status:  No result Specimen:  Sputum, Expectorated     Influenza A & B by Molecular [[id number]]     Order Status:  No result Specimen:  Nasopharyngeal Swab     Blood culture x two cultures. Draw prior to antibiotics. [[id number]] Collected:  03/26/20 6239     Order Status:  Completed Specimen:  Blood from Peripheral, Hand, Left Updated:  03/27/20 0912     Blood Culture, Routine No Growth to date    Narrative:       Aerobic and anaerobic    Blood culture x two cultures. Draw prior to antibiotics. [[id number]] Collected:  03/26/20 2157    Order Status:  Completed Specimen:  Blood from Peripheral, Hand, Left Updated:  03/27/20 0912     Blood Culture, Routine No Growth to date    Narrative:       Aerobic and anaerobic            Imaging  ECG Results          EKG 12-lead (Final result)  Result time 03/27/20 07:03:38    Final result by Interface, Lab In [facility] (03/27/20 07:03:38)                 Narrative:    Test Reason : A41.9,    Vent. Rate : 123 BPM     Atrial Rate : 123 BPM     P-R Int : 130 ms          QRS Dur : 080 ms      QT Int : 350 ms       P-R-T Axes : 073 067 071 degrees     QTc Int : 501 ms    Sinus tachycardia  Otherwise normal ECG  When compared with ECG of 29-FEB-2020 09:31,  No significant change was found  Confirmed by Hans Szymanski MD ([id number]) on 3/27/2020 7:03:23 AM    Referred By: [email or web address]   SELF           Confirmed By:Hans Szymanski MD                              Results for orders placed during the hospital encounter of 02/22/20   Echo Color Flow Doppler? Yes    Narrative · Normal left ventricular systolic function. The estimated ejection   fraction is 60%.  · Normal LV diastolic function.  · Normal right ventricular systolic function.  · Very TDS          CT Abdomen Pelvis With Contrast  Narrative: EXAMINATION:  CTA CHEST NON CORONARY; CT ABDOMEN PELVIS WITH CONTRAST    CLINICAL HISTORY:  Chest pain, acute, PE suspected, intermed prob, positive D-dimer;; Abd pain, fever, abscess suspected;    TECHNIQUE:  CTA chest and CT abdomen and pelvis were obtained following administration of 75 cc Omnipaque 350 IV contrast.    COMPARISON:  CT chest from 03/04/2020.  CT abdomen and pelvis from March 2019.    FINDINGS:  Aorta is nonaneurysmal.  Heart is  normal in size without pericardial effusion.  No significant abnormalities are seen along the esophageal course.  No evidence of pulmonary thromboembolism through the proximal segmental branches.    Trace right-sided pneumothorax is seen.  Dense consolidative changes are visualized within the posterior aspect of the right lower lobe.  Internal cavitation with air and fluid seen, decreased in size from previous CT.  Occlusion is seen of right lower lobe bronchi.  Airways are otherwise patent.  Multifocal opacities and cavitations are seen within the lungs. These appear overall less pronounced and decreased in size and number compared to recent CT chest.  Mild centrilobular emphysematous changes are seen within the upper lobes.  No significant pleural effusion.    No significant hepatic abnormalities are identified.  There is mild hepatosplenomegaly.  Liver measures 20 cm.  Spleen is mildly enlarged measuring 13 cm.  There is no intra-or extrahepatic biliary ductal dilatation.  The gallbladder is unremarkable.  Small hiatal hernia is visualized.  Stomach is otherwise normal in appearance.  Pancreas and adrenal glands show no significant abnormalities.    Kidneys are functioning.  No evidence of hydronephrosis.  Urinary bladder is nondistended.  Prostate is unremarkable.    Appendix is not definitely visualized.  The visualized loops of small and large bowel show no evidence of obstruction or inflammation.  No free air or free fluid.    Aorta tapers normally.    No acute osseous abnormality identified.  Stable appearing well-defined lucent lesion is visualized within the right femoral neck.  Subcutaneous soft tissue show no significant abnormalities.  Impression: 1. Trace right-sided pneumothorax.  Continued radiographic follow-up is recommended to ensure stability.  2. Dense consolidative changes within the right lower lobe with internal cavitation containing air and fluid, decreased in size from previous exam.   Occlusion is seen of the right lower lobe bronchi which may be secondary to mucoid impaction or aspirated material.  Additional multifocal opacities and cavitations are seen within the lungs.  These appear overall less pronounced and decreased in size and number compared to recent CTA chest from 03/04/2020.  3. No evidence of PE.  4. Mild pulmonary emphysema.  5. No acute intra-abdominal abnormalities identified.  6. Additional findings as detailed above.  This report was flagged in Epic as abnormal.    Electronically signed by: Linh Gordon MD  Date:    03/26/2020  Time:    23:58  CTA Chest Non-Coronary (PE Study)  Narrative: EXAMINATION:  CTA CHEST NON CORONARY; CT ABDOMEN PELVIS WITH CONTRAST    CLINICAL HISTORY:  Chest pain, acute, PE suspected, intermed prob, positive D-dimer;; Abd pain, fever, abscess suspected;    TECHNIQUE:  CTA chest and CT abdomen and pelvis were obtained following administration of 75 cc Omnipaque 350 IV contrast.    COMPARISON:  CT chest from 03/04/2020.  CT abdomen and pelvis from March 2019.    FINDINGS:  Aorta is nonaneurysmal.  Heart is normal in size without pericardial effusion.  No significant abnormalities are seen along the esophageal course.  No evidence of pulmonary thromboembolism through the proximal segmental branches.    Trace right-sided pneumothorax is seen.  Dense consolidative changes are visualized within the posterior aspect of the right lower lobe.  Internal cavitation with air and fluid seen, decreased in size from previous CT.  Occlusion is seen of right lower lobe bronchi.  Airways are otherwise patent.  Multifocal opacities and cavitations are seen within the lungs. These appear overall less pronounced and decreased in size and number compared to recent CT chest.  Mild centrilobular emphysematous changes are seen within the upper lobes.  No significant pleural effusion.    No significant hepatic abnormalities are identified.  There is mild hepatosplenomegaly.   Liver measures 20 cm.  Spleen is mildly enlarged measuring 13 cm.  There is no intra-or extrahepatic biliary ductal dilatation.  The gallbladder is unremarkable.  Small hiatal hernia is visualized.  Stomach is otherwise normal in appearance.  Pancreas and adrenal glands show no significant abnormalities.    Kidneys are functioning.  No evidence of hydronephrosis.  Urinary bladder is nondistended.  Prostate is unremarkable.    Appendix is not definitely visualized.  The visualized loops of small and large bowel show no evidence of obstruction or inflammation.  No free air or free fluid.    Aorta tapers normally.    No acute osseous abnormality identified.  Stable appearing well-defined lucent lesion is visualized within the right femoral neck.  Subcutaneous soft tissue show no significant abnormalities.  Impression: 1. Trace right-sided pneumothorax.  Continued radiographic follow-up is recommended to ensure stability.  2. Dense consolidative changes within the right lower lobe with internal cavitation containing air and fluid, decreased in size from previous exam.  Occlusion is seen of the right lower lobe bronchi which may be secondary to mucoid impaction or aspirated material.  Additional multifocal opacities and cavitations are seen within the lungs.  These appear overall less pronounced and decreased in size and number compared to recent CTA chest from 03/04/2020.  3. No evidence of PE.  4. Mild pulmonary emphysema.  5. No acute intra-abdominal abnormalities identified.  6. Additional findings as detailed above.  This report was flagged in Epic as abnormal.    Electronically signed by: Linh Gordon MD  Date:    03/26/2020  Time:    23:58      All imaging within the last 24 hours was reviewed.     Imaging  ECG Results          EKG 12-lead (Final result)  Result time 03/27/20 07:03:38    Final result by Interface, Lab In [facility] (03/27/20 07:03:38)                 Narrative:    Test Reason : A41.9,    Vent. Rate  : 123 BPM     Atrial Rate : 123 BPM     P-R Int : 130 ms          QRS Dur : 080 ms      QT Int : 350 ms       P-R-T Axes : 073 067 071 degrees     QTc Int : 501 ms    Sinus tachycardia  Otherwise normal ECG  When compared with ECG of 29-FEB-2020 09:31,  No significant change was found  Confirmed by Hans Szymanski MD ([id number]) on 3/27/2020 7:03:23 AM    Referred By: AAAREFERR   SELF           Confirmed By:Hans Szymanski MD                                Results for orders placed during the hospital encounter of 02/22/20   Echo Color Flow Doppler? Yes    Narrative · Normal left ventricular systolic function. The estimated ejection   fraction is 60%.  · Normal LV diastolic function.  · Normal right ventricular systolic function.  · Very TDS            CT Abdomen Pelvis With Contrast  Narrative: EXAMINATION:  CTA CHEST NON CORONARY; CT ABDOMEN PELVIS WITH CONTRAST    CLINICAL HISTORY:  Chest pain, acute, PE suspected, intermed prob, positive D-dimer;; Abd pain, fever, abscess suspected;    TECHNIQUE:  CTA chest and CT abdomen and pelvis were obtained following administration of 75 cc Omnipaque 350 IV contrast.    COMPARISON:  CT chest from 03/04/2020.  CT abdomen and pelvis from March 2019.    FINDINGS:  Aorta is nonaneurysmal.  Heart is normal in size without pericardial effusion.  No significant abnormalities are seen along the esophageal course.  No evidence of pulmonary thromboembolism through the proximal segmental branches.    Trace right-sided pneumothorax is seen.  Dense consolidative changes are visualized within the posterior aspect of the right lower lobe.  Internal cavitation with air and fluid seen, decreased in size from previous CT.  Occlusion is seen of right lower lobe bronchi.  Airways are otherwise patent.  Multifocal opacities and cavitations are seen within the lungs. These appear overall less pronounced and decreased in size and number compared to recent CT chest.  Mild centrilobular emphysematous  changes are seen within the upper lobes.  No significant pleural effusion.    No significant hepatic abnormalities are identified.  There is mild hepatosplenomegaly.  Liver measures 20 cm.  Spleen is mildly enlarged measuring 13 cm.  There is no intra-or extrahepatic biliary ductal dilatation.  The gallbladder is unremarkable.  Small hiatal hernia is visualized.  Stomach is otherwise normal in appearance.  Pancreas and adrenal glands show no significant abnormalities.    Kidneys are functioning.  No evidence of hydronephrosis.  Urinary bladder is nondistended.  Prostate is unremarkable.    Appendix is not definitely visualized.  The visualized loops of small and large bowel show no evidence of obstruction or inflammation.  No free air or free fluid.    Aorta tapers normally.    No acute osseous abnormality identified.  Stable appearing well-defined lucent lesion is visualized within the right femoral neck.  Subcutaneous soft tissue show no significant abnormalities.  Impression: 1. Trace right-sided pneumothorax.  Continued radiographic follow-up is recommended to ensure stability.  2. Dense consolidative changes within the right lower lobe with internal cavitation containing air and fluid, decreased in size from previous exam.  Occlusion is seen of the right lower lobe bronchi which may be secondary to mucoid impaction or aspirated material.  Additional multifocal opacities and cavitations are seen within the lungs.  These appear overall less pronounced and decreased in size and number compared to recent CTA chest from 03/04/2020.  3. No evidence of PE.  4. Mild pulmonary emphysema.  5. No acute intra-abdominal abnormalities identified.  6. Additional findings as detailed above.  This report was flagged in Epic as abnormal.    Electronically signed by: Linh Gordon MD  Date:    03/26/2020  Time:    23:58  CTA Chest Non-Coronary (PE Study)  Narrative: EXAMINATION:  CTA CHEST NON CORONARY; CT ABDOMEN PELVIS WITH  CONTRAST    CLINICAL HISTORY:  Chest pain, acute, PE suspected, intermed prob, positive D-dimer;; Abd pain, fever, abscess suspected;    TECHNIQUE:  CTA chest and CT abdomen and pelvis were obtained following administration of 75 cc Omnipaque 350 IV contrast.    COMPARISON:  CT chest from 03/04/2020.  CT abdomen and pelvis from March 2019.    FINDINGS:  Aorta is nonaneurysmal.  Heart is normal in size without pericardial effusion.  No significant abnormalities are seen along the esophageal course.  No evidence of pulmonary thromboembolism through the proximal segmental branches.    Trace right-sided pneumothorax is seen.  Dense consolidative changes are visualized within the posterior aspect of the right lower lobe.  Internal cavitation with air and fluid seen, decreased in size from previous CT.  Occlusion is seen of right lower lobe bronchi.  Airways are otherwise patent.  Multifocal opacities and cavitations are seen within the lungs. These appear overall less pronounced and decreased in size and number compared to recent CT chest.  Mild centrilobular emphysematous changes are seen within the upper lobes.  No significant pleural effusion.    No significant hepatic abnormalities are identified.  There is mild hepatosplenomegaly.  Liver measures 20 cm.  Spleen is mildly enlarged measuring 13 cm.  There is no intra-or extrahepatic biliary ductal dilatation.  The gallbladder is unremarkable.  Small hiatal hernia is visualized.  Stomach is otherwise normal in appearance.  Pancreas and adrenal glands show no significant abnormalities.    Kidneys are functioning.  No evidence of hydronephrosis.  Urinary bladder is nondistended.  Prostate is unremarkable.    Appendix is not definitely visualized.  The visualized loops of small and large bowel show no evidence of obstruction or inflammation.  No free air or free fluid.    Aorta tapers normally.    No acute osseous abnormality identified.  Stable appearing well-defined  lucent lesion is visualized within the right femoral neck.  Subcutaneous soft tissue show no significant abnormalities.  Impression: 1. Trace right-sided pneumothorax.  Continued radiographic follow-up is recommended to ensure stability.  2. Dense consolidative changes within the right lower lobe with internal cavitation containing air and fluid, decreased in size from previous exam.  Occlusion is seen of the right lower lobe bronchi which may be secondary to mucoid impaction or aspirated material.  Additional multifocal opacities and cavitations are seen within the lungs.  These appear overall less pronounced and decreased in size and number compared to recent CTA chest from 03/04/2020.  3. No evidence of PE.  4. Mild pulmonary emphysema.  5. No acute intra-abdominal abnormalities identified.  6. Additional findings as detailed above.  This report was flagged in Epic as abnormal.    Electronically signed by: Linh Gordon MD  Date:    03/26/2020  Time:    23:58          Assessment and Plan:    Active Hospital Problems    Diagnosis  POA    Septic embolism [I76]  Yes      Resolved Hospital Problems   No resolved problems to display.       Suspected Covid-19 Virus Infection  Person under investigation (PUI) for COVID-19  - COVID-19 testing: Collection Date: 3/26/2020 Collection Time:   9:23 PM  - Infection Control notified    - Isolation:   - Airborne and Droplet Precautions  - N95 masks must be fit tested, wear eye protection  - 20 second hand hygiene   - Limit visitors per hospital policy   - Consolidating lab draws, nursing care, and interventions    - Diagnostics: (leukopenia/lymphopenia, hyponatremia, hyperferritinemia, elevated troponin, elevated d-dimer, age, and comorbidities are significant predictors of poor clinical outcome)   - CBC: Leukocytosis 20.17, no lymphopenia, trend Q48hrs  - CMP: +hyponatremia 132, trend Q48hrs  - Procalcitonin: 10.09  - D-dimer: 3.42, trend Q48hrs  - Ferritin: pending, repeat  prior to discharge   - CRP: 50.3, trend Q48hrs  - LDH: pending   - BNP: pending   - Troponin: <0.006   - ECG: sinus tach, 123 BPM   - rapid Flu: pending    - RIP only if BMT/solid transplant: not applicable    - Legionella antigen: pending    - Blood culture x2: NGTD   - Sputum culture: pending    - Chest CT: Dense consolidative changes within the right lower lobe with internal cavitation containing air and fluid   - UA and culture: non-infectious    - CPK: pending     - Management:   - Bundle care as able to minimize in/out of room   - Supplemental O2 to maintain SpO2 >92%,   if requiring 6L NC or higher, place on nonrebreather and discuss case with MICU   - Telemetry & continuous Pulse Ox   - If wheezing   - albuterol INHALER PRN 4puff Q6hr approximates a nebulizer (avoid nebulization of secretions)  - ipratropium daily    - apap PRN fever   - Avoiding NIPPV to prevent aerosolization   (including home CPAP/BiPAP unless on a case-by-case basis and only in negative pressure room)   - Cautious use of NSAIDS for fever per WHO recommendations (3/16/2020)   - No new ACEi/ARB start or discontinuation of chronic med unless hypotensive (Esler et al. Journal of Hypertension 2020, 38:000-000)   - Careful use of steroids in the absence of other indications   - unless septic shock due to increased viral replication   - Fluid sparing resuscitation   - Empiric antibiotics per likely source & patient allergies    - CAP: x 5 day course  Ceftriaxone 1g IV Q24hrs            Azithromycin 500mg IV day #1, then 250mg PO daily x4 days                 If MRSA risk factors, add Vancomycin IV (PharmD consult)   - If patient meets criteria per Hospital Protocol    - start statin (if CPK WNL)    - start HCQ 400mg PO BID x1 day, then 400mg PO daily x 4 days (check G6PD, ECG, and start Qshift POCT glucose)    Goals of care, counseling/discussion  - Discussed the typical clinical course of COVID19 with ** (patient name or relationship to  patient), including the potential for acute decompensation requiring intubation and mechanical ventilation.  - Following this discussion, patient/POA requested code status of **, will update EMR and paper chart accordingly.    Septic embolism   - likely in setting of IVDU  - procalcitonin elevated, 10.09  - consider ID consult   - continue vancomycin and Zosyn    Type 2 diabetes mellitus, uncontrolled  Lab Results   Component Value Date    HGBA1C 11.0 (H) 02/22/2020     uncontrolled  - BG goal 140 - 180   - inpatient regimen: inulin detemir 15U daily, insulin aspart 5U TIDWM  - low dose SSI, ACHS acchuchecks  - Diabetic diet 2000 calories   - monitor for hypoglycemia    Hyperlipidemia   - continue home atorvastatin (LIPITOR) 40 MG tablet daily     Intravenous drug abuse  - active heroin user per notes, unsure of last use  - narcan         VTE High Risk Prophylaxis: enoxaparin 40mg sq QHS @ 2100 (bundled care) if GFR >30    Patient's chronic/stable medical conditions noted in the assessment above will be managed with the patient's home medications as tolerated.       Initial Hospital Care ** Keep only the line that matches your level of service OR opt for Prolonged Service attestation below  Level 1 92593 Total visit time was 30 minutes or greater with greater than 50% of time spent in counseling and coordination of care.  Level 2 10875 Total visit time was 50 minutes or greater with greater than 50% of time spent in counseling and coordination of care.   Level 3 04682 Total visit time was 70 minutes or greater with greater than 50% of time spent in counseling and coordination of care.     Prolonged Service Codes **Add to the level 3 code above  15498 + 79313 Total visit time was 90 minutes or greater with greater than 50% of time spent in counseling and coordination of care.  Add if...  14832 + 62925 + 12753 Total visit time was 120 minutes or greater with greater than 50% of time spent in counseling and  coordination of care.        **(signature)

## 2020-03-27 NOTE — PROGRESS NOTES
Pharmacokinetic Initial Assessment: IV Vancomycin    Assessment/Plan:    - Patient received 1250 mg load  (3/26/20)  - Start Vancomycin 1000 mg Q 12  - Desired empiric serum trough concentration is 10 to 20 mcg/mL  - Trough level ordered for 3/28/20 @1100        Pharmacy will continue to follow and monitor vancomycin.      Please contact pharmacy at extension [phone] with any questions regarding this assessment.     Thank you for the consult,   Filomena EDISON FreemanMurrell       Patient brief summary:  Cali Mabry is a 54 y.o. male initiated on antimicrobial therapy with IV Vancomycin for treatment of suspected lower respiratory infection    Drug Allergies:   Review of patient's allergies indicates:  No Known Allergies    Actual Body Weight:   68 kg    Renal Function:   Estimated Creatinine Clearance: 54.8 mL/min (A) (based on SCr of 1.5 mg/dL (H)).,     Dialysis Method (if applicable):  N/A    CBC (last 72 hours):  Recent Labs   Lab Result Units 03/26/20 2058   WBC K/uL 20.17*   Hemoglobin g/dL 10.0*   Hematocrit % 32.9*   Platelets K/uL 473*   Gran% % 82.2*   Lymph% % 11.9*   Mono% % 4.6   Eosinophil% % 0.1   Basophil% % 0.2   Differential Method  Automated       Metabolic Panel (last 72 hours):  Recent Labs   Lab Result Units 03/26/20 2058 03/27/20  0346   Sodium mmol/L 132*  --    Potassium mmol/L 4.4  --    Chloride mmol/L 97  --    CO2 mmol/L 20*  --    Glucose mg/dL 180*  --    Glucose, UA   --  Negative   BUN, Bld mg/dL 23*  --    Creatinine mg/dL 1.5*  --    Albumin g/dL 2.7*  --    Total Bilirubin mg/dL 0.3  --    Alkaline Phosphatase U/L 117  --    AST U/L 11  --    ALT U/L 10  --    Magnesium mg/dL 2.0  --    Phosphorus mg/dL 4.7*  --        Drug levels (last 3 results):  No results for input(s): VANCOMYCINRA, VANCOMYCINPE, VANCOMYCINTR in the last 72 hours.    Microbiologic Results:  Microbiology Results (last 7 days)     Procedure Component Value Units Date/Time    Culture, Respiratory with Gram Stain  [[id number]]     Order Status:  No result Specimen:  Sputum, Expectorated     Influenza A & B by Molecular [[id number]]     Order Status:  No result Specimen:  Nasopharyngeal Swab     Blood culture x two cultures. Draw prior to antibiotics. [[id number]] Collected:  03/26/20 2157    Order Status:  Completed Specimen:  Blood from Peripheral, Hand, Left Updated:  03/27/20 0912     Blood Culture, Routine No Growth to date    Narrative:       Aerobic and anaerobic    Blood culture x two cultures. Draw prior to antibiotics. [[id number]] Collected:  03/26/20 2157    Order Status:  Completed Specimen:  Blood from Peripheral, Hand, Left Updated:  03/27/20 0912     Blood Culture, Routine No Growth to date    Narrative:       Aerobic and anaerobic

## 2020-03-27 NOTE — ED NOTES
Bed: 22  Expected date: 3/27/20  Expected time: 8:35 AM  Means of arrival:   Comments:  EMS/+COVID

## 2020-03-27 NOTE — ED NOTES
"Pt reports being unable to provide a sputum sample "it's way too f*cking painful to cough, no way"  "

## 2020-03-27 NOTE — ED PROVIDER NOTES
Source of History:  Patient    Chief complaint:  Flank Pain (right flank pain onset today, denies emesis) and Transfer (arrived via [facility] from [facility], COVID rule out, transferrd for CT surgery eval of lungs)      HPI:  Cali Mabry is a 54 y.o. male presenting with right chest and flank pain for couple of days that started worsening today.    ROS: As per HPI and below:  General: No fever.  No chills.  Eyes: No visual changes.  Head: No headache.    Integument: No rashes or lesions.  Chest: No shortness of breath.  Cardiovascular: Notes chest pain.  Abdomen: Notes abdominal pain.  No nausea or vomiting.  Urinary: No abnormal urination.  Neurologic: No focal weakness.  No numbness.  Hematologic: No easy bruising.  Endocrine: No excessive thirst or urination.      Review of patient's allergies indicates:  No Known Allergies    No current facility-administered medications on file prior to encounter.      Current Outpatient Medications on File Prior to Encounter   Medication Sig Dispense Refill    atorvastatin (LIPITOR) 40 MG tablet Take 40 mg by mouth once daily.      gabapentin (NEURONTIN) 300 MG capsule Take 300 mg by mouth.      ibuprofen (ADVIL,MOTRIN) 600 MG tablet Take 1 tablet (600 mg total) by mouth every 6 (six) hours as needed for Pain. (Patient not taking: Reported on 3/11/2020) 20 tablet 0    insulin aspart U-100 (NOVOLOG) 100 unit/mL (3 mL) InPn pen Inject 10 Units into the skin.      LANTUS 100 unit/mL injection Inject 15 Units into the skin 2 (two) times daily. (Patient taking differently: Inject 30 Units into the skin once daily. ) 9 mL 0    naloxone (NARCAN) 1 mg/mL injection 2 mg (1 mg per nostril) by Nasal route as needed for opioid overdose; may repeat in 3 to 5 minutes if not effective. Call 911 (Patient not taking: Reported on 3/11/2020) 2 mL 11    naloxone (NARCAN) 4 mg/actuation Spry 4mg by nasal route as needed for opioid overdose; may repeat every 2-3 minutes in  alternating nostrils until medical help arrives. Call 911 (Patient not taking: Reported on 3/11/2020) 1 each 11    nicotine (NICODERM CQ) 21 mg/24 hr Place 1 patch onto the skin once daily. (Patient not taking: Reported on 3/11/2020)  0    omeprazole (PRILOSEC) 40 MG capsule Take 1 capsule (40 mg total) by mouth once daily. 30 capsule 11    oxyCODONE-acetaminophen (PERCOCET) 5-325 mg per tablet Take 1 tablet by mouth every 4 (four) hours as needed for Pain. 15 tablet 0       PMH:  As per HPI and below:  Past Medical History:   Diagnosis Date    Diabetes mellitus     Hepatitis     HTN (hypertension)     Pancreatitis      Past Surgical History:   Procedure Laterality Date    CARDIAC SURGERY  1999    stent placed. ([facility])    ESOPHAGOGASTRODUODENOSCOPY N/A 3/5/2020    Procedure: EGD (ESOPHAGOGASTRODUODENOSCOPY);  Surgeon: Brinda Rene MD;  Location: [facility];  Service: Endoscopy;  Laterality: N/A;  W421 A; [phone]    SHOULDER SURGERY  2013    right shoulder, spur removal.       Social History     Socioeconomic History    Marital status: Single     Spouse name: Not on file    Number of children: Not on file    Years of education: Not on file    Highest education level: Not on file   Occupational History    Not on file   Social Needs    Financial resource strain: Not on file    Food insecurity:     Worry: Not on file     Inability: Not on file    Transportation needs:     Medical: Not on file     Non-medical: Not on file   Tobacco Use    Smoking status: Current Every Day Smoker     Packs/day: 1.00     Years: 20.00     Pack years: 20.00     Types: Cigarettes     Start date: 4/3/1981    Smokeless tobacco: Never Used   Substance and Sexual Activity    Alcohol use: No     Alcohol/week: 0.0 standard drinks    Drug use: Yes     Types: IV, Marijuana, Heroin     Comment: currently, used today, herion    Sexual activity: Yes     Partners: Female   Lifestyle    Physical activity:     Days per  "week: Not on file     Minutes per session: Not on file    Stress: Not on file   Relationships    Social connections:     Talks on phone: Not on file     Gets together: Not on file     Attends Jehovah's witness service: Not on file     Active member of club or organization: Not on file     Attends meetings of clubs or organizations: Not on file     Relationship status: Not on file   Other Topics Concern    Not on file   Social History Narrative    Not on file       Family History   Problem Relation Age of Onset    Asthma Mother        Physical Exam:    Vitals:    03/27/20 0602 03/27/20 0800 03/27/20 0903 03/27/20 0909   BP: 123/71 134/73 134/75 (!) 147/68   BP Location:    Left arm   Patient Position:    Sitting   Pulse: 94 95 95 98   Resp: 19 20 20 20   Temp:  98.7 °F (37.1 °C)  98.4 °F (36.9 °C)   TempSrc:  Oral  Oral   SpO2: 100% 100% 100% 100%   Weight:   68 kg (150 lb)    Height:   5' 8" (1.727 m)      Appearance: Uncomfortable.  Skin: No rashes seen.  Good turgor.  No abrasions.  No ecchymoses.  Eyes: No conjunctival injection.  ENT: Oropharynx clear.    Chest: No retractions. No tachypnea.  Cardiovascular: Regular rate and rhythm.   Abdomen: Soft.  Not distended. RUQ tenderness.  No guarding.  No rebound.  Musculoskeletal: Good range of motion all joints.  No deformities.  Neck supple.  No meningismus.  Neurologic: Motor intact.  Sensation intact.  Cerebellar intact.  Cranial nerves intact.  Mental Status:  Alert and oriented x 3.  Appropriate, conversant.      Laboratory Studies:  Labs Reviewed   CBC W/ AUTO DIFFERENTIAL - Abnormal; Notable for the following components:       Result Value    WBC 20.17 (*)     RBC 3.92 (*)     Hemoglobin 10.0 (*)     Hematocrit 32.9 (*)     Mean Corpuscular Hemoglobin 25.5 (*)     Mean Corpuscular Hemoglobin Conc 30.4 (*)     RDW 17.6 (*)     Platelets 473 (*)     Immature Granulocytes 1.0 (*)     Gran # (ANC) 16.6 (*)     Immature Grans (Abs) 0.21 (*)     Gran% 82.2 (*)     " Lymph% 11.9 (*)     All other components within normal limits   COMPREHENSIVE METABOLIC PANEL - Abnormal; Notable for the following components:    Sodium 132 (*)     CO2 20 (*)     Glucose 180 (*)     BUN, Bld 23 (*)     Creatinine 1.5 (*)     Total Protein 10.0 (*)     Albumin 2.7 (*)     eGFR if non  52 (*)     All other components within normal limits   URINALYSIS, REFLEX TO URINE CULTURE - Abnormal; Notable for the following components:    Protein, UA 1+ (*)     Occult Blood UA 1+ (*)     All other components within normal limits    Narrative:     Preferred Collection Type->Urine, Clean Catch   PHOSPHORUS - Abnormal; Notable for the following components:    Phosphorus 4.7 (*)     All other components within normal limits   PROCALCITONIN - Abnormal; Notable for the following components:    Procalcitonin 10.09 (*)     All other components within normal limits   C-REACTIVE PROTEIN - Abnormal; Notable for the following components:    CRP 50.3 (*)     All other components within normal limits   D DIMER, QUANTITATIVE - Abnormal; Notable for the following components:    D-Dimer 3.42 (*)     All other components within normal limits   LACTIC ACID, PLASMA - Abnormal; Notable for the following components:    Lactate (Lactic Acid) 2.6 (*)     All other components within normal limits   POCT GLUCOSE - Abnormal; Notable for the following components:    POCT Glucose 172 (*)     All other components within normal limits   ISTAT PROCEDURE - Abnormal; Notable for the following components:    POC SATURATED O2 90 (*)     All other components within normal limits   CULTURE, BLOOD    Narrative:     Aerobic and anaerobic   CULTURE, BLOOD    Narrative:     Aerobic and anaerobic   LACTIC ACID, PLASMA   MAGNESIUM   TROPONIN I   BETA - HYDROXYBUTYRATE, SERUM   LIPASE   URINALYSIS MICROSCOPIC    Narrative:     Preferred Collection Type->Urine, Clean Catch   SARS-COV-2 (COVID-19) QUALITATIVE PCR   POCT INFLUENZA A/B  MOLECULAR   POCT GLUCOSE MONITORING CONTINUOUS   POCT GLUCOSE MONITORING CONTINUOUS       Chart reviewed.     Imaging Results           CTA Chest Non-Coronary (PE Study) (Final result)  Result time 03/26/20 23:58:21    Final result by Linh Gordon MD (03/26/20 23:58:21)                 Impression:      1. Trace right-sided pneumothorax.  Continued radiographic follow-up is recommended to ensure stability.  2. Dense consolidative changes within the right lower lobe with internal cavitation containing air and fluid, decreased in size from previous exam.  Occlusion is seen of the right lower lobe bronchi which may be secondary to mucoid impaction or aspirated material.  Additional multifocal opacities and cavitations are seen within the lungs.  These appear overall less pronounced and decreased in size and number compared to recent CTA chest from 03/04/2020.  3. No evidence of PE.  4. Mild pulmonary emphysema.  5. No acute intra-abdominal abnormalities identified.  6. Additional findings as detailed above.  This report was flagged in Epic as abnormal.      Electronically signed by: Linh Gordon MD  Date:    03/26/2020  Time:    23:58             Narrative:    EXAMINATION:  CTA CHEST NON CORONARY; CT ABDOMEN PELVIS WITH CONTRAST    CLINICAL HISTORY:  Chest pain, acute, PE suspected, intermed prob, positive D-dimer;; Abd pain, fever, abscess suspected;    TECHNIQUE:  CTA chest and CT abdomen and pelvis were obtained following administration of 75 cc Omnipaque 350 IV contrast.    COMPARISON:  CT chest from 03/04/2020.  CT abdomen and pelvis from March 2019.    FINDINGS:  Aorta is nonaneurysmal.  Heart is normal in size without pericardial effusion.  No significant abnormalities are seen along the esophageal course.  No evidence of pulmonary thromboembolism through the proximal segmental branches.    Trace right-sided pneumothorax is seen.  Dense consolidative changes are visualized within the posterior aspect of  the right lower lobe.  Internal cavitation with air and fluid seen, decreased in size from previous CT.  Occlusion is seen of right lower lobe bronchi.  Airways are otherwise patent.  Multifocal opacities and cavitations are seen within the lungs. These appear overall less pronounced and decreased in size and number compared to recent CT chest.  Mild centrilobular emphysematous changes are seen within the upper lobes.  No significant pleural effusion.    No significant hepatic abnormalities are identified.  There is mild hepatosplenomegaly.  Liver measures 20 cm.  Spleen is mildly enlarged measuring 13 cm.  There is no intra-or extrahepatic biliary ductal dilatation.  The gallbladder is unremarkable.  Small hiatal hernia is visualized.  Stomach is otherwise normal in appearance.  Pancreas and adrenal glands show no significant abnormalities.    Kidneys are functioning.  No evidence of hydronephrosis.  Urinary bladder is nondistended.  Prostate is unremarkable.    Appendix is not definitely visualized.  The visualized loops of small and large bowel show no evidence of obstruction or inflammation.  No free air or free fluid.    Aorta tapers normally.    No acute osseous abnormality identified.  Stable appearing well-defined lucent lesion is visualized within the right femoral neck.  Subcutaneous soft tissue show no significant abnormalities.                                CT Abdomen Pelvis With Contrast (Final result)  Result time 03/26/20 23:58:21    Final result by Linh Gordon MD (03/26/20 23:58:21)                 Impression:      1. Trace right-sided pneumothorax.  Continued radiographic follow-up is recommended to ensure stability.  2. Dense consolidative changes within the right lower lobe with internal cavitation containing air and fluid, decreased in size from previous exam.  Occlusion is seen of the right lower lobe bronchi which may be secondary to mucoid impaction or aspirated material.  Additional  multifocal opacities and cavitations are seen within the lungs.  These appear overall less pronounced and decreased in size and number compared to recent CTA chest from 03/04/2020.  3. No evidence of PE.  4. Mild pulmonary emphysema.  5. No acute intra-abdominal abnormalities identified.  6. Additional findings as detailed above.  This report was flagged in Epic as abnormal.      Electronically signed by: Linh Gordon MD  Date:    03/26/2020  Time:    23:58             Narrative:    EXAMINATION:  CTA CHEST NON CORONARY; CT ABDOMEN PELVIS WITH CONTRAST    CLINICAL HISTORY:  Chest pain, acute, PE suspected, intermed prob, positive D-dimer;; Abd pain, fever, abscess suspected;    TECHNIQUE:  CTA chest and CT abdomen and pelvis were obtained following administration of 75 cc Omnipaque 350 IV contrast.    COMPARISON:  CT chest from 03/04/2020.  CT abdomen and pelvis from March 2019.    FINDINGS:  Aorta is nonaneurysmal.  Heart is normal in size without pericardial effusion.  No significant abnormalities are seen along the esophageal course.  No evidence of pulmonary thromboembolism through the proximal segmental branches.    Trace right-sided pneumothorax is seen.  Dense consolidative changes are visualized within the posterior aspect of the right lower lobe.  Internal cavitation with air and fluid seen, decreased in size from previous CT.  Occlusion is seen of right lower lobe bronchi.  Airways are otherwise patent.  Multifocal opacities and cavitations are seen within the lungs. These appear overall less pronounced and decreased in size and number compared to recent CT chest.  Mild centrilobular emphysematous changes are seen within the upper lobes.  No significant pleural effusion.    No significant hepatic abnormalities are identified.  There is mild hepatosplenomegaly.  Liver measures 20 cm.  Spleen is mildly enlarged measuring 13 cm.  There is no intra-or extrahepatic biliary ductal dilatation.  The gallbladder  is unremarkable.  Small hiatal hernia is visualized.  Stomach is otherwise normal in appearance.  Pancreas and adrenal glands show no significant abnormalities.    Kidneys are functioning.  No evidence of hydronephrosis.  Urinary bladder is nondistended.  Prostate is unremarkable.    Appendix is not definitely visualized.  The visualized loops of small and large bowel show no evidence of obstruction or inflammation.  No free air or free fluid.    Aorta tapers normally.    No acute osseous abnormality identified.  Stable appearing well-defined lucent lesion is visualized within the right femoral neck.  Subcutaneous soft tissue show no significant abnormalities.                               X-Ray Chest AP Portable (Final result)  Result time 03/26/20 21:51:26    Final result by Cristo Fish MD (03/26/20 21:51:26)                 Impression:      Interval improvement of bibasilar pulmonary infiltrates right greater than left.  Recommend follow-up.      Electronically signed by: Cristo Fish  Date:    03/26/2020  Time:    21:51             Narrative:    EXAMINATION:  XR CHEST AP PORTABLE    CLINICAL HISTORY:  Sepsis;    TECHNIQUE:  Single frontal view of the chest was performed.    COMPARISON:  03/07/2020    FINDINGS:  Heart is normal size.  No edema.    Right basilar patchy infiltrate.    Minimal left basilar infiltrates also.    Findings suggest pneumonia.    Overall improvement from the prior study.  No detrimental change.    No acute osseous abnormality.                                Medications Given:  Medications   piperacillin-tazobactam 4.5 g in sodium chloride 0.9% 100 mL IVPB (ready to mix system) (4.5 g Intravenous New Bag 3/27/20 0911)   morphine injection 4 mg (4 mg Intravenous Given 3/27/20 0808)   sodium chloride 0.9% bolus 1,000 mL (0 mLs Intravenous Stopped 3/27/20 0147)   morphine injection 4 mg (4 mg Intravenous Given 3/26/20 4766)   vancomycin 1.25 g in dextrose 5% 250 mL IVPB (ready to mix)  (0 mg/kg × 68 kg Intravenous Stopped 3/27/20 0016)   iohexoL (OMNIPAQUE 350) injection 75 mL (75 mLs Intravenous Given 3/26/20 2444)   sodium chloride 0.9% bolus 1,100 mL (0 mLs Intravenous Stopped 3/27/20 0903)   morphine injection 4 mg (4 mg Intravenous Given 3/27/20 0947)       Discussed with: hospital medicine    ED Course:           MDM:    Transfer [facility] for fever for pain on the right side.  CT of the abdomen and chest show improving cavitary lesions but nothing otherwise acute.  He is receiving a COVID rule out workup; will admit to Medicine.    Diagnostic Impression:    1. Septic embolism    2. Sepsis    3. Empyema lung         ED Disposition Condition    Admit               Chapito Elizondo MD  03/27/20 1014

## 2020-03-27 NOTE — ED PROVIDER NOTES
"Encounter Date: 3/26/2020    SCRIBE #1 NOTE: I, Cary Borges, am scribing for, and in the presence of,  Real Stoll MD. I have scribed the following portions of the note - Other sections scribed: HPI, ROS, PE, EKG.       History     Chief Complaint   Patient presents with    Flank Pain     right flank pain onset today, denies emesis     CC: Chest pain    Patient is a 54 y.o male with a PMHx of HTN and DM who presents to the ED complaining of right, lower chest pain for a "couple" of days. Pain is worse with respiration. He reports of assocaited RUQ pain. He denies fever, cough, nausea, and emesis. Patient reports taking ABx TID since being discharged for PNA on 03/11/2020. Patient has a Hx of IVDU. He denies any drug use since PNA diagnosis.     The history is provided by the patient.     Review of patient's allergies indicates:  No Known Allergies  Past Medical History:   Diagnosis Date    Diabetes mellitus     Hepatitis     HTN (hypertension)     Pancreatitis      Past Surgical History:   Procedure Laterality Date    CARDIAC SURGERY  1999    stent placed. ([facility])    ESOPHAGOGASTRODUODENOSCOPY N/A 3/5/2020    Procedure: EGD (ESOPHAGOGASTRODUODENOSCOPY);  Surgeon: Brinda Rene MD;  Location: [facility];  Service: Endoscopy;  Laterality: N/A;  W421 A; [phone]    SHOULDER SURGERY  2013    right shoulder, spur removal.     Family History   Problem Relation Age of Onset    Asthma Mother      Social History     Tobacco Use    Smoking status: Current Every Day Smoker     Packs/day: 1.00     Years: 20.00     Pack years: 20.00     Types: Cigarettes     Start date: 4/3/1981    Smokeless tobacco: Never Used   Substance Use Topics    Alcohol use: No     Alcohol/week: 0.0 standard drinks    Drug use: Yes     Types: IV, Marijuana, Heroin     Comment: currently, used today, herion     Review of Systems   Constitutional: Negative for fever.   HENT: Negative for sore throat.    Respiratory: " Negative for cough.    Cardiovascular: Positive for chest pain (Right, lower chest).   Gastrointestinal: Positive for abdominal pain (RUQ). Negative for nausea and vomiting.   Genitourinary: Negative for dysuria.   Musculoskeletal: Negative for back pain.   Skin: Negative for rash.   Neurological: Negative for headaches.   Psychiatric/Behavioral: Negative for confusion.       Physical Exam     Initial Vitals [03/26/20 2017]   BP Pulse Resp Temp SpO2   (!) 121/58 (!) 123 (!) 24 97.8 °F (36.6 °C) (!) 90 %      MAP       --         Physical Exam    Nursing note and vitals reviewed.  Constitutional: He appears well-developed and well-nourished. He is not diaphoretic. No distress.   Patient is uncomfortable appearing.   HENT:   Head: Normocephalic and atraumatic.   Eyes: Conjunctivae and EOM are normal. Pupils are equal, round, and reactive to light. Right eye exhibits no discharge. Left eye exhibits no discharge.   Neck: Normal range of motion. No tracheal deviation present.   Cardiovascular: Normal rate and normal heart sounds. Exam reveals no gallop and no friction rub.    No murmur heard.  Tachycardic.   Pulmonary/Chest: No stridor. No respiratory distress. He has no wheezes. He has no rhonchi. He has rales. He exhibits no tenderness.   Diminished breath sounds to the right lower lung with inspiratory crackles. Tachypneic.    Abdominal: Soft. He exhibits no distension and no mass. There is tenderness. There is no rebound and no guarding.   Diffuse abdominal tenderness to palpation.    Musculoskeletal: Normal range of motion. He exhibits no edema or tenderness.   Neurological: He is alert and oriented to person, place, and time. He has normal strength.   Skin: Skin is warm and dry. No rash noted.   Psychiatric: He has a normal mood and affect. His behavior is normal. Judgment and thought content normal.         ED Course   Critical Care  Date/Time: 3/27/2020 8:36 AM  Performed by: Real Stoll MD  Authorized  by: Real Stoll MD   Direct patient critical care time: 30 minutes  Total critical care time (exclusive of procedural time) : 30 minutes        Labs Reviewed   CBC W/ AUTO DIFFERENTIAL - Abnormal; Notable for the following components:       Result Value    WBC 20.17 (*)     RBC 3.92 (*)     Hemoglobin 10.0 (*)     Hematocrit 32.9 (*)     Mean Corpuscular Hemoglobin 25.5 (*)     Mean Corpuscular Hemoglobin Conc 30.4 (*)     RDW 17.6 (*)     Platelets 473 (*)     Immature Granulocytes 1.0 (*)     Gran # (ANC) 16.6 (*)     Immature Grans (Abs) 0.21 (*)     Gran% 82.2 (*)     Lymph% 11.9 (*)     All other components within normal limits   COMPREHENSIVE METABOLIC PANEL - Abnormal; Notable for the following components:    Sodium 132 (*)     CO2 20 (*)     Glucose 180 (*)     BUN, Bld 23 (*)     Creatinine 1.5 (*)     Total Protein 10.0 (*)     Albumin 2.7 (*)     eGFR if non  52 (*)     All other components within normal limits   URINALYSIS, REFLEX TO URINE CULTURE - Abnormal; Notable for the following components:    Protein, UA 1+ (*)     Occult Blood UA 1+ (*)     All other components within normal limits    Narrative:     Preferred Collection Type->Urine, Clean Catch   PHOSPHORUS - Abnormal; Notable for the following components:    Phosphorus 4.7 (*)     All other components within normal limits   PROCALCITONIN - Abnormal; Notable for the following components:    Procalcitonin 10.09 (*)     All other components within normal limits   C-REACTIVE PROTEIN - Abnormal; Notable for the following components:    CRP 50.3 (*)     All other components within normal limits   D DIMER, QUANTITATIVE - Abnormal; Notable for the following components:    D-Dimer 3.42 (*)     All other components within normal limits   LACTIC ACID, PLASMA - Abnormal; Notable for the following components:    Lactate (Lactic Acid) 2.6 (*)     All other components within normal limits   POCT GLUCOSE - Abnormal; Notable for the  following components:    POCT Glucose 172 (*)     All other components within normal limits   ISTAT PROCEDURE - Abnormal; Notable for the following components:    POC SATURATED O2 90 (*)     All other components within normal limits   CULTURE, BLOOD   CULTURE, BLOOD   LACTIC ACID, PLASMA   MAGNESIUM   TROPONIN I   BETA - HYDROXYBUTYRATE, SERUM   LIPASE   URINALYSIS MICROSCOPIC    Narrative:     Preferred Collection Type->Urine, Clean Catch   SARS-COV-2 (COVID-19) QUALITATIVE PCR   POCT INFLUENZA A/B MOLECULAR   POCT GLUCOSE MONITORING CONTINUOUS   POCT GLUCOSE MONITORING CONTINUOUS     EKG Readings: (Independently Interpreted)   Initial Reading: No STEMI. Previous EKG: Compared with most recent EKG Previous EKG Date: 02/29/2020. Rhythm: Sinus Tachycardia. Heart Rate: 123 bpm. Ectopy: No Ectopy. Conduction: Normal. ST Segments: Normal ST Segments. T Waves: Normal. Axis: Normal.     ECG Results          EKG 12-lead (Final result)  Result time 03/27/20 07:03:38    Final result by Interface, Lab In [facility] (03/27/20 07:03:38)                 Narrative:    Test Reason : A41.9,    Vent. Rate : 123 BPM     Atrial Rate : 123 BPM     P-R Int : 130 ms          QRS Dur : 080 ms      QT Int : 350 ms       P-R-T Axes : 073 067 071 degrees     QTc Int : 501 ms    Sinus tachycardia  Otherwise normal ECG  When compared with ECG of 29-FEB-2020 09:31,  No significant change was found  Confirmed by Hans Szymanski MD ([id number]) on 3/27/2020 7:03:23 AM    Referred By: [email or web address]   SELF           Confirmed By:Hans Szymanski MD                            Imaging Results           CTA Chest Non-Coronary (PE Study) (Final result)  Result time 03/26/20 23:58:21    Final result by Linh Gordon MD (03/26/20 23:58:21)                 Impression:      1. Trace right-sided pneumothorax.  Continued radiographic follow-up is recommended to ensure stability.  2. Dense consolidative changes within the right lower lobe with internal cavitation  containing air and fluid, decreased in size from previous exam.  Occlusion is seen of the right lower lobe bronchi which may be secondary to mucoid impaction or aspirated material.  Additional multifocal opacities and cavitations are seen within the lungs.  These appear overall less pronounced and decreased in size and number compared to recent CTA chest from 03/04/2020.  3. No evidence of PE.  4. Mild pulmonary emphysema.  5. No acute intra-abdominal abnormalities identified.  6. Additional findings as detailed above.  This report was flagged in Epic as abnormal.      Electronically signed by: Linh Gordon MD  Date:    03/26/2020  Time:    23:58             Narrative:    EXAMINATION:  CTA CHEST NON CORONARY; CT ABDOMEN PELVIS WITH CONTRAST    CLINICAL HISTORY:  Chest pain, acute, PE suspected, intermed prob, positive D-dimer;; Abd pain, fever, abscess suspected;    TECHNIQUE:  CTA chest and CT abdomen and pelvis were obtained following administration of 75 cc Omnipaque 350 IV contrast.    COMPARISON:  CT chest from 03/04/2020.  CT abdomen and pelvis from March 2019.    FINDINGS:  Aorta is nonaneurysmal.  Heart is normal in size without pericardial effusion.  No significant abnormalities are seen along the esophageal course.  No evidence of pulmonary thromboembolism through the proximal segmental branches.    Trace right-sided pneumothorax is seen.  Dense consolidative changes are visualized within the posterior aspect of the right lower lobe.  Internal cavitation with air and fluid seen, decreased in size from previous CT.  Occlusion is seen of right lower lobe bronchi.  Airways are otherwise patent.  Multifocal opacities and cavitations are seen within the lungs. These appear overall less pronounced and decreased in size and number compared to recent CT chest.  Mild centrilobular emphysematous changes are seen within the upper lobes.  No significant pleural effusion.    No significant hepatic abnormalities are  identified.  There is mild hepatosplenomegaly.  Liver measures 20 cm.  Spleen is mildly enlarged measuring 13 cm.  There is no intra-or extrahepatic biliary ductal dilatation.  The gallbladder is unremarkable.  Small hiatal hernia is visualized.  Stomach is otherwise normal in appearance.  Pancreas and adrenal glands show no significant abnormalities.    Kidneys are functioning.  No evidence of hydronephrosis.  Urinary bladder is nondistended.  Prostate is unremarkable.    Appendix is not definitely visualized.  The visualized loops of small and large bowel show no evidence of obstruction or inflammation.  No free air or free fluid.    Aorta tapers normally.    No acute osseous abnormality identified.  Stable appearing well-defined lucent lesion is visualized within the right femoral neck.  Subcutaneous soft tissue show no significant abnormalities.                                CT Abdomen Pelvis With Contrast (Final result)  Result time 03/26/20 23:58:21    Final result by Linh Gordon MD (03/26/20 23:58:21)                 Impression:      1. Trace right-sided pneumothorax.  Continued radiographic follow-up is recommended to ensure stability.  2. Dense consolidative changes within the right lower lobe with internal cavitation containing air and fluid, decreased in size from previous exam.  Occlusion is seen of the right lower lobe bronchi which may be secondary to mucoid impaction or aspirated material.  Additional multifocal opacities and cavitations are seen within the lungs.  These appear overall less pronounced and decreased in size and number compared to recent CTA chest from 03/04/2020.  3. No evidence of PE.  4. Mild pulmonary emphysema.  5. No acute intra-abdominal abnormalities identified.  6. Additional findings as detailed above.  This report was flagged in Epic as abnormal.      Electronically signed by: Linh Gordon MD  Date:    03/26/2020  Time:    23:58             Narrative:     EXAMINATION:  CTA CHEST NON CORONARY; CT ABDOMEN PELVIS WITH CONTRAST    CLINICAL HISTORY:  Chest pain, acute, PE suspected, intermed prob, positive D-dimer;; Abd pain, fever, abscess suspected;    TECHNIQUE:  CTA chest and CT abdomen and pelvis were obtained following administration of 75 cc Omnipaque 350 IV contrast.    COMPARISON:  CT chest from 03/04/2020.  CT abdomen and pelvis from March 2019.    FINDINGS:  Aorta is nonaneurysmal.  Heart is normal in size without pericardial effusion.  No significant abnormalities are seen along the esophageal course.  No evidence of pulmonary thromboembolism through the proximal segmental branches.    Trace right-sided pneumothorax is seen.  Dense consolidative changes are visualized within the posterior aspect of the right lower lobe.  Internal cavitation with air and fluid seen, decreased in size from previous CT.  Occlusion is seen of right lower lobe bronchi.  Airways are otherwise patent.  Multifocal opacities and cavitations are seen within the lungs. These appear overall less pronounced and decreased in size and number compared to recent CT chest.  Mild centrilobular emphysematous changes are seen within the upper lobes.  No significant pleural effusion.    No significant hepatic abnormalities are identified.  There is mild hepatosplenomegaly.  Liver measures 20 cm.  Spleen is mildly enlarged measuring 13 cm.  There is no intra-or extrahepatic biliary ductal dilatation.  The gallbladder is unremarkable.  Small hiatal hernia is visualized.  Stomach is otherwise normal in appearance.  Pancreas and adrenal glands show no significant abnormalities.    Kidneys are functioning.  No evidence of hydronephrosis.  Urinary bladder is nondistended.  Prostate is unremarkable.    Appendix is not definitely visualized.  The visualized loops of small and large bowel show no evidence of obstruction or inflammation.  No free air or free fluid.    Aorta tapers normally.    No acute  osseous abnormality identified.  Stable appearing well-defined lucent lesion is visualized within the right femoral neck.  Subcutaneous soft tissue show no significant abnormalities.                               X-Ray Chest AP Portable (Final result)  Result time 03/26/20 21:51:26    Final result by Cristo Fish MD (03/26/20 21:51:26)                 Impression:      Interval improvement of bibasilar pulmonary infiltrates right greater than left.  Recommend follow-up.      Electronically signed by: Cristo Fish  Date:    03/26/2020  Time:    21:51             Narrative:    EXAMINATION:  XR CHEST AP PORTABLE    CLINICAL HISTORY:  Sepsis;    TECHNIQUE:  Single frontal view of the chest was performed.    COMPARISON:  03/07/2020    FINDINGS:  Heart is normal size.  No edema.    Right basilar patchy infiltrate.    Minimal left basilar infiltrates also.    Findings suggest pneumonia.    Overall improvement from the prior study.  No detrimental change.    No acute osseous abnormality.                                 Medical Decision Making:   History:   Old Medical Records: I decided to obtain old medical records.  Initial Assessment:   54-year-old male presenting with abdominal and right-sided chest pain.  Recently admitted with complicated hospital course.  Found to have pneumonia with septic emboli as well as cavitary lesions on chest x-ray.  History somewhat limited due to patient's somnolence.  Able to answer questions, though frequently falling asleep.  He is uncomfortable appearing, moaning and tachypneic.  Is also tachycardic and hypoxic, 90% on room air, improved to 93% on nasal cannula.  History of IV drug use, concern for septic emboli.  He denies IV drug use since discharge.  Per discharge summary, it does not appear he may have been discharged on antibiotics, though when asked if he is taking antibiotics the patient says yes.  He is unable to provide further details.  I have concern for recurrent  pneumonia, bacteremia, sepsis.  His abdomen is tender, points to the right upper quadrant as the source of pain.  Is unclear to me if this is referred pain due to known abscess of the lower right part of his lung.  We will get labs, chest x-ray, possible CT scan for further evaluation.  Independently Interpreted Test(s):   I have ordered and independently interpreted EKG Reading(s) - see prior notes  Clinical Tests:   Lab Tests: Ordered and Reviewed  Radiological Study: Ordered and Reviewed  Medical Tests: Ordered and Reviewed  ED Management:  CT scan slightly improved, though still shows significant empyema with air-fluid levels.  Patient meets sirs criteria with leukocytosis, tachypnea, tachycardia.  Thirty male per kg fluid bolus given.  Patient started on vanc and Zosyn.  Discussed with Hospital Medicine, will transfer to [facility] for possible IR versus CT surgery consult.  Pain controlled with IV morphine.            Scribe Attestation:   Scribe #1: I performed the above scribed service and the documentation accurately describes the services I performed. I attest to the accuracy of the note.                          Clinical Impression:       ICD-10-CM ICD-9-CM   1. Septic embolism I76 415.12   2. Sepsis A41.9 038.9     995.91   3. Empyema lung J86.9 510.9         Disposition:   Disposition: Transferred  Condition: Stable     ED Disposition Condition    Transfer to Another Facility Stable             I, Real Stoll, personally performed the services described in this documentation. All medical record entries made by the scribe were at my direction and in my presence.  I have reviewed the chart and agree that the record reflects my personal performance and is accurate and complete.             Real Stoll MD  03/27/20 0888

## 2020-03-27 NOTE — ED NOTES
Bed rails are up and call light is within patient reach. Pt is on continuous cardiac monitoring, pulse oximetry and cycling blood pressure. Will continue hourly rounding.

## 2020-03-27 NOTE — ED NOTES
Sister called RN phone -- phone was brought into pts room to speak with him. She wants to be called at [phone] (Amarilis) with updates, [place]. Regarding his COVID status. He lives with elderly mother who has asthma.

## 2020-03-27 NOTE — PROGRESS NOTES
PharmD Consult received for:  1.) Admit medication history/reconciliation:Pending     2.) Renally adjust all medications:  Estimated Creatinine Clearance: 74.7 mL/min (based on SCr of 1.1 mg/dL).   RAMYA recovered   Medications reviewed, no dose adjustments needed    3.) Vancomycin: see Pharm.D note from 3/27/20     Pharmacy will continue to follow up.    Thank you for the consult,   Naz Peters  Ext [phone]     **Note: Consults are reviewed Monday-Friday 7:00am-3:30pm. The above recommendations are only suggested. The recommendations should be considered in conjunction with all patient factors.**

## 2020-03-28 PROBLEM — R07.9 CHEST PAIN: Status: ACTIVE | Noted: 2020-03-28

## 2020-03-28 LAB
ALBUMIN SERPL BCP-MCNC: 1.7 G/DL (ref 3.5–5.2)
ALP SERPL-CCNC: 102 U/L (ref 55–135)
ALT SERPL W/O P-5'-P-CCNC: 9 U/L (ref 10–44)
AMPHET+METHAMPHET UR QL: NEGATIVE
ANION GAP SERPL CALC-SCNC: 9 MMOL/L (ref 8–16)
AST SERPL-CCNC: 10 U/L (ref 10–40)
BARBITURATES UR QL SCN>200 NG/ML: NEGATIVE
BASOPHILS # BLD AUTO: 0.02 K/UL (ref 0–0.2)
BASOPHILS NFR BLD: 0.1 % (ref 0–1.9)
BENZODIAZ UR QL SCN>200 NG/ML: NEGATIVE
BILIRUB SERPL-MCNC: 0.6 MG/DL (ref 0.1–1)
BUN SERPL-MCNC: 14 MG/DL (ref 6–20)
BZE UR QL SCN: NEGATIVE
CALCIUM SERPL-MCNC: 8.4 MG/DL (ref 8.7–10.5)
CANNABINOIDS UR QL SCN: NEGATIVE
CHLORIDE SERPL-SCNC: 96 MMOL/L (ref 95–110)
CO2 SERPL-SCNC: 24 MMOL/L (ref 23–29)
CREAT SERPL-MCNC: 1 MG/DL (ref 0.5–1.4)
CREAT UR-MCNC: 76 MG/DL (ref 23–375)
DIFFERENTIAL METHOD: ABNORMAL
EOSINOPHIL # BLD AUTO: 0 K/UL (ref 0–0.5)
EOSINOPHIL NFR BLD: 0 % (ref 0–8)
ERYTHROCYTE [DISTWIDTH] IN BLOOD BY AUTOMATED COUNT: 17.2 % (ref 11.5–14.5)
EST. GFR  (AFRICAN AMERICAN): >60 ML/MIN/1.73 M^2
EST. GFR  (NON AFRICAN AMERICAN): >60 ML/MIN/1.73 M^2
ETHANOL UR-MCNC: <10 MG/DL
GLUCOSE SERPL-MCNC: 214 MG/DL (ref 70–110)
HCT VFR BLD AUTO: 28.4 % (ref 40–54)
HGB BLD-MCNC: 8.6 G/DL (ref 14–18)
IMM GRANULOCYTES # BLD AUTO: 0.11 K/UL (ref 0–0.04)
IMM GRANULOCYTES NFR BLD AUTO: 0.8 % (ref 0–0.5)
LACTATE SERPL-SCNC: 2.6 MMOL/L (ref 0.5–2.2)
LYMPHOCYTES # BLD AUTO: 1.3 K/UL (ref 1–4.8)
LYMPHOCYTES NFR BLD: 9.7 % (ref 18–48)
MAGNESIUM SERPL-MCNC: 1.9 MG/DL (ref 1.6–2.6)
MCH RBC QN AUTO: 25.7 PG (ref 27–31)
MCHC RBC AUTO-ENTMCNC: 30.3 G/DL (ref 32–36)
MCV RBC AUTO: 85 FL (ref 82–98)
METHADONE UR QL SCN>300 NG/ML: NEGATIVE
MONOCYTES # BLD AUTO: 1.4 K/UL (ref 0.3–1)
MONOCYTES NFR BLD: 10.1 % (ref 4–15)
NEUTROPHILS # BLD AUTO: 10.8 K/UL (ref 1.8–7.7)
NEUTROPHILS NFR BLD: 79.3 % (ref 38–73)
NRBC BLD-RTO: 0 /100 WBC
OPIATES UR QL SCN: NORMAL
PCP UR QL SCN>25 NG/ML: NEGATIVE
PHOSPHATE SERPL-MCNC: 2.9 MG/DL (ref 2.7–4.5)
PLATELET # BLD AUTO: 357 K/UL (ref 150–350)
PMV BLD AUTO: 9.5 FL (ref 9.2–12.9)
POCT GLUCOSE: 117 MG/DL (ref 70–110)
POCT GLUCOSE: 252 MG/DL (ref 70–110)
POCT GLUCOSE: 278 MG/DL (ref 70–110)
POCT GLUCOSE: 58 MG/DL (ref 70–110)
POCT GLUCOSE: 60 MG/DL (ref 70–110)
POCT GLUCOSE: 95 MG/DL (ref 70–110)
POTASSIUM SERPL-SCNC: 4.6 MMOL/L (ref 3.5–5.1)
PROT SERPL-MCNC: 7.8 G/DL (ref 6–8.4)
RBC # BLD AUTO: 3.34 M/UL (ref 4.6–6.2)
SARS-COV-2 RNA RESP QL NAA+PROBE: NOT DETECTED
SODIUM SERPL-SCNC: 129 MMOL/L (ref 136–145)
TOXICOLOGY INFORMATION: NORMAL
WBC # BLD AUTO: 13.58 K/UL (ref 3.9–12.7)

## 2020-03-28 PROCEDURE — 20600001 HC STEP DOWN PRIVATE ROOM

## 2020-03-28 PROCEDURE — 85025 COMPLETE CBC W/AUTO DIFF WBC: CPT

## 2020-03-28 PROCEDURE — 63600175 PHARM REV CODE 636 W HCPCS: Performed by: EMERGENCY MEDICINE

## 2020-03-28 PROCEDURE — 25000003 PHARM REV CODE 250: Performed by: INTERNAL MEDICINE

## 2020-03-28 PROCEDURE — 99233 SBSQ HOSP IP/OBS HIGH 50: CPT | Mod: GC,,, | Performed by: STUDENT IN AN ORGANIZED HEALTH CARE EDUCATION/TRAINING PROGRAM

## 2020-03-28 PROCEDURE — 80307 DRUG TEST PRSMV CHEM ANLYZR: CPT

## 2020-03-28 PROCEDURE — 99499 UNLISTED E&M SERVICE: CPT | Mod: ,,, | Performed by: NURSE PRACTITIONER

## 2020-03-28 PROCEDURE — 63600175 PHARM REV CODE 636 W HCPCS: Performed by: NURSE PRACTITIONER

## 2020-03-28 PROCEDURE — 99233 PR SUBSEQUENT HOSPITAL CARE,LEVL III: ICD-10-PCS | Mod: GC,,, | Performed by: STUDENT IN AN ORGANIZED HEALTH CARE EDUCATION/TRAINING PROGRAM

## 2020-03-28 PROCEDURE — 99499 NO LOS: ICD-10-PCS | Mod: ,,, | Performed by: NURSE PRACTITIONER

## 2020-03-28 PROCEDURE — 36415 COLL VENOUS BLD VENIPUNCTURE: CPT

## 2020-03-28 PROCEDURE — 80202 ASSAY OF VANCOMYCIN: CPT

## 2020-03-28 PROCEDURE — 63600175 PHARM REV CODE 636 W HCPCS: Performed by: STUDENT IN AN ORGANIZED HEALTH CARE EDUCATION/TRAINING PROGRAM

## 2020-03-28 PROCEDURE — 83605 ASSAY OF LACTIC ACID: CPT

## 2020-03-28 PROCEDURE — 87632 RESP VIRUS 6-11 TARGETS: CPT

## 2020-03-28 PROCEDURE — 25000003 PHARM REV CODE 250: Performed by: EMERGENCY MEDICINE

## 2020-03-28 PROCEDURE — 83735 ASSAY OF MAGNESIUM: CPT

## 2020-03-28 PROCEDURE — 80053 COMPREHEN METABOLIC PANEL: CPT

## 2020-03-28 PROCEDURE — 94640 AIRWAY INHALATION TREATMENT: CPT

## 2020-03-28 PROCEDURE — 63600175 PHARM REV CODE 636 W HCPCS: Performed by: INTERNAL MEDICINE

## 2020-03-28 PROCEDURE — 84100 ASSAY OF PHOSPHORUS: CPT

## 2020-03-28 PROCEDURE — 63600175 PHARM REV CODE 636 W HCPCS: Performed by: PHYSICIAN ASSISTANT

## 2020-03-28 RX ORDER — ALBUTEROL SULFATE 90 UG/1
2 AEROSOL, METERED RESPIRATORY (INHALATION)
Status: DISCONTINUED | OUTPATIENT
Start: 2020-03-28 | End: 2020-03-28

## 2020-03-28 RX ORDER — VANCOMYCIN HCL IN 5 % DEXTROSE 1G/250ML
1000 PLASTIC BAG, INJECTION (ML) INTRAVENOUS
Status: DISCONTINUED | OUTPATIENT
Start: 2020-03-28 | End: 2020-04-03

## 2020-03-28 RX ORDER — IPRATROPIUM BROMIDE AND ALBUTEROL SULFATE 2.5; .5 MG/3ML; MG/3ML
3 SOLUTION RESPIRATORY (INHALATION) EVERY 4 HOURS PRN
Status: DISCONTINUED | OUTPATIENT
Start: 2020-03-28 | End: 2020-04-22 | Stop reason: HOSPADM

## 2020-03-28 RX ORDER — OXYCODONE HYDROCHLORIDE 10 MG/1
10 TABLET ORAL EVERY 4 HOURS PRN
Status: DISCONTINUED | OUTPATIENT
Start: 2020-03-28 | End: 2020-03-28

## 2020-03-28 RX ORDER — OXYCODONE HYDROCHLORIDE 5 MG/1
5 TABLET ORAL EVERY 4 HOURS PRN
Status: DISCONTINUED | OUTPATIENT
Start: 2020-03-28 | End: 2020-03-29

## 2020-03-28 RX ORDER — CEFEPIME HYDROCHLORIDE 2 G/1
2 INJECTION, POWDER, FOR SOLUTION INTRAVENOUS
Status: DISCONTINUED | OUTPATIENT
Start: 2020-03-28 | End: 2020-04-01

## 2020-03-28 RX ORDER — INSULIN ASPART 100 [IU]/ML
7 INJECTION, SOLUTION INTRAVENOUS; SUBCUTANEOUS
Status: DISCONTINUED | OUTPATIENT
Start: 2020-03-28 | End: 2020-03-29

## 2020-03-28 RX ORDER — ATORVASTATIN CALCIUM 20 MG/1
20 TABLET, FILM COATED ORAL DAILY
Status: DISCONTINUED | OUTPATIENT
Start: 2020-03-28 | End: 2020-04-22 | Stop reason: HOSPADM

## 2020-03-28 RX ADMIN — CEFEPIME 2 G: 2 INJECTION, POWDER, FOR SOLUTION INTRAVENOUS at 11:03

## 2020-03-28 RX ADMIN — OXYCODONE HYDROCHLORIDE 5 MG: 5 TABLET ORAL at 11:03

## 2020-03-28 RX ADMIN — INSULIN ASPART 7 UNITS: 100 INJECTION, SOLUTION INTRAVENOUS; SUBCUTANEOUS at 10:03

## 2020-03-28 RX ADMIN — CEFEPIME 2 G: 2 INJECTION, POWDER, FOR SOLUTION INTRAVENOUS at 04:03

## 2020-03-28 RX ADMIN — ATORVASTATIN CALCIUM 20 MG: 20 TABLET, FILM COATED ORAL at 10:03

## 2020-03-28 RX ADMIN — OXYCODONE HYDROCHLORIDE 10 MG: 10 TABLET ORAL at 10:03

## 2020-03-28 RX ADMIN — OXYCODONE HYDROCHLORIDE 5 MG: 5 TABLET ORAL at 08:03

## 2020-03-28 RX ADMIN — MORPHINE SULFATE 4 MG: 4 INJECTION INTRAVENOUS at 03:03

## 2020-03-28 RX ADMIN — DEXTROSE 125 ML: 10 SOLUTION INTRAVENOUS at 09:03

## 2020-03-28 RX ADMIN — MORPHINE SULFATE 4 MG: 4 INJECTION INTRAVENOUS at 12:03

## 2020-03-28 RX ADMIN — MORPHINE SULFATE 4 MG: 4 INJECTION INTRAVENOUS at 06:03

## 2020-03-28 RX ADMIN — INSULIN ASPART 6 UNITS: 100 INJECTION, SOLUTION INTRAVENOUS; SUBCUTANEOUS at 10:03

## 2020-03-28 RX ADMIN — VANCOMYCIN HYDROCHLORIDE 1000 MG: 1 INJECTION, POWDER, LYOPHILIZED, FOR SOLUTION INTRAVENOUS at 12:03

## 2020-03-28 RX ADMIN — PIPERACILLIN AND TAZOBACTAM 4.5 G: 4; .5 INJECTION, POWDER, LYOPHILIZED, FOR SOLUTION INTRAVENOUS; PARENTERAL at 06:03

## 2020-03-28 RX ADMIN — ENOXAPARIN SODIUM 40 MG: 100 INJECTION SUBCUTANEOUS at 09:03

## 2020-03-28 RX ADMIN — INSULIN DETEMIR 15 UNITS: 100 INJECTION, SOLUTION SUBCUTANEOUS at 08:03

## 2020-03-28 RX ADMIN — INSULIN ASPART 6 UNITS: 100 INJECTION, SOLUTION INTRAVENOUS; SUBCUTANEOUS at 08:03

## 2020-03-28 RX ADMIN — OXYCODONE HYDROCHLORIDE 10 MG: 10 TABLET ORAL at 02:03

## 2020-03-28 RX ADMIN — ALBUTEROL SULFATE 2 PUFF: 90 AEROSOL, METERED RESPIRATORY (INHALATION) at 01:03

## 2020-03-28 RX ADMIN — VANCOMYCIN HYDROCHLORIDE 1000 MG: 1 INJECTION, POWDER, LYOPHILIZED, FOR SOLUTION INTRAVENOUS at 04:03

## 2020-03-28 RX ADMIN — INSULIN ASPART 5 UNITS: 100 INJECTION, SOLUTION INTRAVENOUS; SUBCUTANEOUS at 08:03

## 2020-03-28 NOTE — SUBJECTIVE & OBJECTIVE
Interval History: No acute events overnight, sleepy when seen this afternoon , will hold off IV meds , no fevers remains on 2 L.    Review of Systems  Objective:     Vital Signs (Most Recent):  Temp: 98.9 °F (37.2 °C) (03/28/20 1045)  Pulse: 95 (03/28/20 1115)  Resp: 19 (03/28/20 1115)  BP: 101/66 (03/28/20 1115)  SpO2: 97 % (03/28/20 1115) Vital Signs (24h Range):  Temp:  [97.9 °F (36.6 °C)-100.4 °F (38 °C)] 98.9 °F (37.2 °C)  Pulse:  [] 95  Resp:  [19-32] 19  SpO2:  [94 %-97 %] 97 %  BP: (101-131)/(66-76) 101/66     Weight: 60.2 kg (132 lb 11.5 oz)  Body mass index is 20.18 kg/m².    Intake/Output Summary (Last 24 hours) at 3/28/2020 1447  Last data filed at 3/28/2020 0400  Gross per 24 hour   Intake 530 ml   Output --   Net 530 ml      Physical Exam    Significant Labs:   CBC:   Recent Labs   Lab 03/26/20 2058 03/27/20  1148 03/28/20  1112   WBC 20.17* 11.36 13.58*   HGB 10.0* 8.5* 8.6*   HCT 32.9* 28.2* 28.4*   * 412* 357*     CMP:   Recent Labs   Lab 03/26/20 2058 03/27/20  1148 03/28/20  1112   * 131* 129*   K 4.4 5.0 4.6   CL 97 97 96   CO2 20* 23 24   * 232* 214*   BUN 23* 17 14   CREATININE 1.5* 1.1 1.0   CALCIUM 9.2 8.1* 8.4*   PROT 10.0* 9.0* 7.8   ALBUMIN 2.7* 2.2* 1.7*   BILITOT 0.3 0.7 0.6   ALKPHOS 117 97 102   AST 11 18 10   ALT 10 10 9*   ANIONGAP 15 11 9   EGFRNONAA 52* >60.0 >60.0       Significant Imaging: I have reviewed all pertinent imaging results/findings within the past 24 hours.  I have reviewed and interpreted all pertinent imaging results/findings within the past 24 hours.

## 2020-03-28 NOTE — ASSESSMENT & PLAN NOTE
54 year old with chronic pancreatitis, DM, HTN, HCV, IVDU who was transferred from [facility] ED where he presented with right chest and flank pain X 2 days, COVID rule out.  Recent admission at [facility] from 2/22 to 3/8 with PNA and MRSA bacteremia, treated with IV abx, then discharged.   Complains of chest pain worse with movement and inspiration. Intermittent non-productive cough.  Denies fevers, chills, sore throat.  Last IVDU unclear.    Afebrile on admit with WBC 20.  Procal 10.09, Ferritin 265, ESR 72, CRP 50.    Flu negative, COVID negative.   Blood cultures NGTD.  Respiratory culture pending, QG pending.     CTA chest 3/26 - negative for PE; dense consolidative changes in RLL with interval cavitation containing air and fluid.  Occlusion seen in RLL bronchi ? Secondary to mucoid impaction or aspirated material.   Noted to have multifocal opacities and cavitations, though improved from CT of 3/4.     Currently on IV vancomycin and zosyn.        Plan/recommendations:  1.  Continue IV vancomycin.   2.  Discontinue zosyn and start cefepime 2 grams IV q 8 hours.   3.  Sputum for AFB X 3 and MTB pcr  4.  Respiratory culture  5.  Will follow blood cultures

## 2020-03-28 NOTE — PROGRESS NOTES
With each interaction today, patient has been using profanity, patient was instructed to call for assistance with ambulation. Patient got up without assistance and pulled his newly placed PICC team IV out, I attempted 3 times to get an iV, patient is a VERY difficult stick. Unable to obtain IV access. Patient was extremely ugly to the echo tech when she came to the bedside to complete echo, tech stated she would come back in the am.

## 2020-03-28 NOTE — ASSESSMENT & PLAN NOTE
-HbA1c 11  -insulin aspart U-100 pen 1-10 Units, 1-10 Units, Subcutaneous, QID (AC + HS) PRN               -this is moderate dose  -Increase insulin aspart U-100 increase to 7 units today  -Increase insulin detemir U-100 pen to  20 Units today               -Home dose is Lantus 30 U Qday

## 2020-03-28 NOTE — HPI
"Cali Mabry is a 54 y.o. with DM II, hepatitis C, HTN, polysubstance abuse/IVDU, pancreatitis who presents, after transfer from [facility], with a 2 day history of chest pain and shortness of breath.  Chest pain is centralized and just described as "painful." Pain aggravated with movement and deep breathing. He reports an intermittent cough that is non-productive. He denies sick contacts, fever, chills, sore throat. Upon questioning, patient stated "I have the virus so just treat me for it." Educated patient that testing is still in process. Inquired about IV drug use however he was unable to recall when he last used. Patient swatted examiner's hand after chest palpation, yelling "that hurts!" Patient became aggravated, reported his pain was now worse and requested pain medication. Per nurse, patient given PRN dose an hour prior. Patient encouraged to continue discussing his plan of care however stated, "I don't want to chat."     ED: Afebrile with leukocytosis. CTA chest from 3/26 with dense consolidative changes within the right lower lobe with internal cavitation containing air and fluid; Occlusion is seen of the right lower lobe bronchi which may be secondary to mucoid impaction or aspirated material; Additional multifocal opacities and cavitations.  Vancomycin and Zosyn administered. On 2L NC. VSS. Admitted to hospital medicine for further evaluation and COVID-19 rule out.   "

## 2020-03-28 NOTE — PROGRESS NOTES
"[facility] Hospital Medicine  Progress Note    Patient Name: Cali Mabry  MRN: [id number]  Patient Class: IP- Inpatient   Admission Date: 3/26/2020  Length of Stay: 1 days  Attending Physician: Jered Diehl MD  Primary Care Provider: Primary Doctor No    Hospital Medicine Team: [facility] HOSP MED 1 Kelsey Gomez MD    Subjective:     Principal Problem:Acute respiratory failure with hypoxia        HPI:  Cali Mabry is a 54 y.o. with DM II, hepatitis C, HTN, polysubstance abuse/IVDU, pancreatitis who presents, after transfer from [facility], with a 2 day history of chest pain and shortness of breath.  Chest pain is centralized and just described as "painful." Pain aggravated with movement and deep breathing. He reports an intermittent cough that is non-productive. He denies sick contacts, fever, chills, sore throat. Upon questioning, patient stated "I have the virus so just treat me for it." Educated patient that testing is still in process. Inquired about IV drug use however he was unable to recall when he last used. Patient swatted examiner's hand after chest palpation, yelling "that hurts!" Patient became aggravated, reported his pain was now worse and requested pain medication. Per nurse, patient given PRN dose an hour prior. Patient encouraged to continue discussing his plan of care however stated, "I don't want to chat."   ED: Afebrile with leukocytosis. CTA chest from 3/26 with dense consolidative changes within the right lower lobe with internal cavitation containing air and fluid; Occlusion is seen of the right lower lobe bronchi which may be secondary to mucoid impaction or aspirated material; Additional multifocal opacities and cavitations.  Vancomycin and Zosyn administered. On 2L NC. VSS. Admitted to hospital medicine for further evaluation and COVID-19 rule out.        Overview/Hospital Course:  Patient admitted for COVID rule out. Covid -Was stepped to non covid team " 3/28. Last fever was 3/28 at 3 am, sating well on 2 L.     Interval History: No acute events overnight, sleepy when seen this afternoon , will hold off IV meds , no fevers remains on 2 L.    Review of Systems  Objective:     Vital Signs (Most Recent):  Temp: 98.9 °F (37.2 °C) (03/28/20 1045)  Pulse: 95 (03/28/20 1115)  Resp: 19 (03/28/20 1115)  BP: 101/66 (03/28/20 1115)  SpO2: 97 % (03/28/20 1115) Vital Signs (24h Range):  Temp:  [97.9 °F (36.6 °C)-100.4 °F (38 °C)] 98.9 °F (37.2 °C)  Pulse:  [] 95  Resp:  [19-32] 19  SpO2:  [94 %-97 %] 97 %  BP: (101-131)/(66-76) 101/66     Weight: 60.2 kg (132 lb 11.5 oz)  Body mass index is 20.18 kg/m².    Intake/Output Summary (Last 24 hours) at 3/28/2020 1447  Last data filed at 3/28/2020 0400  Gross per 24 hour   Intake 530 ml   Output --   Net 530 ml      Physical Exam    Significant Labs:   CBC:   Recent Labs   Lab 03/26/20 2058 03/27/20  1148 03/28/20  1112   WBC 20.17* 11.36 13.58*   HGB 10.0* 8.5* 8.6*   HCT 32.9* 28.2* 28.4*   * 412* 357*     CMP:   Recent Labs   Lab 03/26/20 2058 03/27/20  1148 03/28/20  1112   * 131* 129*   K 4.4 5.0 4.6   CL 97 97 96   CO2 20* 23 24   * 232* 214*   BUN 23* 17 14   CREATININE 1.5* 1.1 1.0   CALCIUM 9.2 8.1* 8.4*   PROT 10.0* 9.0* 7.8   ALBUMIN 2.7* 2.2* 1.7*   BILITOT 0.3 0.7 0.6   ALKPHOS 117 97 102   AST 11 18 10   ALT 10 10 9*   ANIONGAP 15 11 9   EGFRNONAA 52* >60.0 >60.0       Significant Imaging: I have reviewed all pertinent imaging results/findings within the past 24 hours.  I have reviewed and interpreted all pertinent imaging results/findings within the past 24 hours.      Assessment/Plan:      Chest pain      -oxyCODONE immediate release tablet 5 mg, 5 mg, Oral, Q4H PRN, moderate pain      Acute respiratory failure with hypoxia  Multifocal Bacterial pneumonia  Cavitary lung lesions  -Dense consolidative changes are visualized within the posterior aspect of the right lower lobe.    -Internal  cavitation with air and fluid seen, decreased in size from previous CT.    -Occlusion is seen of right lower lobe bronchi.  Airways are otherwise patent.    -Multifocal opacities and cavitations are seen within the lungs  -ID consulted and disucssed case with them will switch to cefepime and vanc and try to rule out TB  -Quantiferon gold pending  -COVID testing 3/26/20 - NEGATIVE  -vancomycin in dextrose 5 % 1 gram/250 mL IVPB 1,000 mg, 15 mg/kg, Intravenous, Q12H              -Adjust to pharmacy   -dextromethorphan-guaifenesin  mg/5 ml liquid 10 mL, 10 mL, Oral, Q4H PRN  -albuterol-ipratropium 2.5 mg-0.5 mg/3 mL nebulizer solution 3 mL, 3 mL, Nebulization, Q4H PRN      Intravenous drug abuse  -check urine drug screen  -counseled on cessation      Hyperlipidemia  - continue statin       Essential hypertension        Type 2 diabetes mellitus, uncontrolled  -HbA1c 11  -insulin aspart U-100 pen 1-10 Units, 1-10 Units, Subcutaneous, QID (AC + HS) PRN               -this is moderate dose  -Increase insulin aspart U-100 increase to 7 units today  -Increase insulin detemir U-100 pen to  20 Units today               -Home dose is Lantus 30 U Qday           VTE Risk Mitigation (From admission, onward)         Ordered     enoxaparin injection 40 mg  Daily      03/27/20 1029     IP VTE HIGH RISK PATIENT  Once      03/27/20 1511                      Kelsey Gomez MD  Department of Hospital Medicine   [facility]

## 2020-03-28 NOTE — ASSESSMENT & PLAN NOTE
Multifocal Bacterial pneumonia  Cavitary lung lesions  -Dense consolidative changes are visualized within the posterior aspect of the right lower lobe.    -Internal cavitation with air and fluid seen, decreased in size from previous CT.    -Occlusion is seen of right lower lobe bronchi.  Airways are otherwise patent.    -Multifocal opacities and cavitations are seen within the lungs  -ID consulted and disucssed case with them will switch to cefepime and vanc and try to rule out TB  -Quantiferon gold pending  -COVID testing 3/26/20 - NEGATIVE  -vancomycin in dextrose 5 % 1 gram/250 mL IVPB 1,000 mg, 15 mg/kg, Intravenous, Q12H              -Adjust to pharmacy   -dextromethorphan-guaifenesin  mg/5 ml liquid 10 mL, 10 mL, Oral, Q4H PRN  -albuterol-ipratropium 2.5 mg-0.5 mg/3 mL nebulizer solution 3 mL, 3 mL, Nebulization, Q4H PRN

## 2020-03-28 NOTE — PROGRESS NOTES
Notified Jason STEPHENS with IM of the following:  Patients BG 60, administered dextrose, patient is not eating or drinking, administered meal insulin for lunch because the patient stated that he would eat, then he fell asleep. BP trending down, HR remains elevated. Sats 92% now on 3LNC. Rapid notified. Will continue to monitor.

## 2020-03-28 NOTE — HPI
Cali Mabry is a 54 year old with chronic pancreatitis, DM, HTN, HCV, IVDU who was transferred from [facility] ED where he presented with right chest and flank pain X 2 days, COVID rule out.  Recent admission at [facility] from 2/22 to 3/8 with PNA and MRSA bacteremia, treated with IV abx, then discharged.   Complains of chest pain worse with movement and inspiration. Intermittent non-productive cough.  Denies fevers, chills, sore throat.  Last IVDU unclear.    Afebrile on admit with WBC 20.  Procal 10.09, Ferritin 265, ESR 72, CRP 50.    Flu negative, COVID negative.   Blood cultures NGTD.  Respiratory culture pending, QG pending.     CTA chest 3/26 - negative for PE; dense consolidative changes in RLL with interval cavitation containing air and fluid.  Occlusion seen in RLL bronchi ? Secondary to mucoid impaction or aspirated material.   Noted to have multifocal opacities and cavitations, small pneumothorax though improved from CT of 3/4.      CXR 3/29 appears to have increasing opacities right side.  Pulmonary consulted.  Currently on IV vancomycin and cefepime     Patient states he has been living with is mother since last hospital discharge.  Mother has been healthy, no recent illness.  Denies fevers at home, no night sweats.  Has had poor appetite and believes lost weight because of this.  No significant cough or SOB.  No hemoptysis.  Primary complaint is right sided chest pain with movement and inspiration.      Risk factors for TB;  No known exposures  Remote foreign travel to [facility]  Does have history of incarceration  No homelessness

## 2020-03-28 NOTE — PROGRESS NOTES
Patient arrived to the unit, patient oriented to room and call bell, placed in isolation for TB pending. Attached to visi, vitals stable. Patient complains of 10/10 abd pain, po pain medication was just given. Patient requested cereal and milk, BG elevated insulin given. IPAD activated. Informed patient as why is he on isolation, and informed of plan of care. Will continue to monitor.

## 2020-03-28 NOTE — HOSPITAL COURSE
55 yo male with IVDU with Hep C, hx of MRSA, Pancreatitis, DM2 (DKA 2/2020), anxiety who presented as a transfer from [facility] for SOB and CP.    He was initially admitted on 02/22 and treated for DKA d/t noncompliance with insulin.  Infectious workup revealed RLL pneumonia.  Initial blood culture on 02/22 with MRSA in 1 set, likely from IVDU.  He had persistent fever and leukocytosis during his admission.  Subsequent blood cultures no growth, final.  Respiratory cultures with MRSA, E coli, Klebsiella.  PICC line was removed and tip Cx neg.  He was on IV vanc, ceftriaxone, Zosyn due to hypoxemic respiratory failure and possible aspiration.  CXR with some improvement.  CT head, abd without signs of septic emboli.  TTE without vegetation.  He was unable to be discharged to LTAC to complete ABX due to IVDU.  He was discharged on 03/08 without additional ABX.    He then presented to the ED on 03/27 with CP, SOB.  Admitted to recent IV heroin relapse.  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  Labs revealed leukocytosis of 20, elevated procalcitonin.  Flu, COVID testing negative.   Respiratory panel with enterovirus, rhinovirus.  QuantiFERON gold intermediate.  AFBs neg.  He had persistent fevers, but repeat blood cultures have remained negative.  S/p chest tube placement on 03/30 and tPA x 6.  CT surgery determined that no VATS was needed.  His chest tube was removed on 04/02 and he was weaned from 2 L nasal cannula to room air.  Pleural fluid cx with lactobacillus.  He completed Zosyn on 04/18 per ID recs.  Given suspected septic emboli -> lungs despite negative TTE, ID recommended IV vanc for 6 weeks (end date 05/10/2020) through his left PICC.  He was discharged to [facility] in [place] to complete course of IV  antibiotics.  His Vanc trough needs to be drawn on 04/25 at 1930, 30 min before the 4th dose.     Plan is to repeat CT chest on 05/08 to monitor cavitary lesions.  If lung lesions are persistent, plan is to switch to doxycycline p.o. 100 b.i.d until resolution of lung cavitations.  If patient leaves AMA, can offer linezolid, but would be suboptimal treatment.  He has an outpatient appointment with ID.    He was counseled on cessation of IVDU heroin and tobacco use.  He has HCV with high VL.  HIV NR.  He was instructed to follow-up with hepatology for treatment.  He declined outpatient referral to Addiction Medicine.    He has chronic LLE pain, neuropathy, which led to relapse of IV heroin.  Gabapentin 300 t.i.d. was started and he had improvement of pain with lidocaine patch and Percocet 10.  He also reports daily anxiety and mild symptoms of depression.  He denied episodes of vero.  Cymbalta 60 was started for chronic pain, anxiety, depression.  He was instructed to establish care with his new PCP for continued SSRI titration.    His DM 1 (diagnosed 40 years ago) is uncontrolled with A1c of 11 .  Levemir was gradually titrated to 22 b.i.d with NovoLog 8 t.i.d. A.c. His BG varied widely (60-400s) with hypoglycemia unawareness due to noncompliance with diet.  He was often eating outside food and soft drinks.  He declined speaking with diabetes education.  He states that he will make an appointment with endocrinology on the [facility].    He has a history of hyperlipidemia.  Lipitor was continued.  CT head showed stable remote infarct and no new infarct or septic emboli.

## 2020-03-28 NOTE — CONSULTS
[facility]  Infectious Disease  Consult Note    Patient Name: Cali Mabry  MRN: [id number]  Admission Date: 3/26/2020  Hospital Length of Stay: 1 days  Attending Physician: Jered Diehl MD  Primary Care Provider: Primary Doctor No     Isolation Status: Airborne and Contact and Droplet, Airborne and Contact and Droplet    Patient information was obtained from past medical records and ER records.      Inpatient consult to Infectious Diseases  Consult performed by: ARLENE Garcia, ANP  Consult ordered by: PAWEL Mendieta MD  Reason for consult: IVDA, cavitary lesions          This is a 54 year old with chronic pancreatitis, DM, HTN, HCV, IVDU transferred from [facility] ED where he presented with right chest and flank pain X 2 days, COVID rule out.  History of recent admission at [facility] from 2/22 to 3/8 with PNA and MRSA bacteremia, treated with IV abx then discharged.  Per admission HPI, he complains of chest pain worse with movement and inspiration. Intermittent non-productive cough.  Denies fevers, chills, sore throat.  Last IVDU unclear.       Afebrile on admit with WBC 20.  Procal 10.09, Ferritin 265, ESR 72, CRP 50.  D dimer elevated 3.42.  Last HIV 2/26/20 negative, new HIV pending.       Flu negative, COVID negative.  Blood cultures NGTD.  Respiratory culture pending, QG pending collection for Monday.       CTA chest on 3/26 was negative for PE but showed dense consolidative changes in RLL with interval cavitation containing air and fluid.  Occlusion seen in RLL bronchi ? Secondary to mucoid impaction or aspirated material. Trace right sided pneumothorax noted.   Noted to have multifocal opacities and cavitations, though improved from CT of 3/4 (last admission).       Patient in isolation and room not entered at this time to minimize potential for Covid-19 exposure of patient.  Patient observed through window.  Sleeping, no apparent distress, no respiratory distress,  O2 nasal cannula not in place.  Attempted to call patient on room phone and cell phone for further history, but does not answer phone.       Labs/vitals/chart reviewed.  O2 sats 97%, hemodynamically stable.  Reviewed CT of 3/4 and 3/26 with ID staff. Overall, opacities and cavitations appear improved.  Low suspicion for TB or fungal infection; suspect small cavitations related to prior septic emboli, unresolved PNA but will get sputum for AFB.     Will see patient tomorrow to complete consult.     Plan/recommendations:  1.  Continue IV vancomycin.  Pharmacy to dose.  Maintain trough 15-20  2.  Discontinue zosyn and start cefepime 2 grams IV q 8 hours.   3.  Sputum for AFB q 8 hours X 3 with one early morning collection  4.  Sputum for MTB pcr ordered  5.  Will follow up blood and respiratory cultures.   6.  Anticipate will need antibiotics (may ultimately be able to go out on orals) until resolution of changes on CXR.    7.  Will follow    Data reviewed and plan discussed with ID staff  Discussed with Primary Team.

## 2020-03-29 LAB
ALBUMIN FLD-MCNC: 1.5 G/DL
ALBUMIN SERPL BCP-MCNC: 1.6 G/DL (ref 3.5–5.2)
ALP SERPL-CCNC: 94 U/L (ref 55–135)
ALT SERPL W/O P-5'-P-CCNC: 10 U/L (ref 10–44)
AMYLASE, BODY FLUID: 84 U/L
ANION GAP SERPL CALC-SCNC: 8 MMOL/L (ref 8–16)
APPEARANCE FLD: NORMAL
ASCENDING AORTA: 2.92 CM
AST SERPL-CCNC: 11 U/L (ref 10–40)
BASOPHILS # BLD AUTO: 0.02 K/UL (ref 0–0.2)
BASOPHILS NFR BLD: 0.2 % (ref 0–1.9)
BILIRUB SERPL-MCNC: 0.5 MG/DL (ref 0.1–1)
BODY FLD TYPE: NORMAL
BODY FLUID SOURCE AMYLASE: NORMAL
BODY FLUID SOURCE, LDH: NORMAL
BSA FOR ECHO PROCEDURE: 1.71 M2
BUN SERPL-MCNC: 16 MG/DL (ref 6–20)
CALCIUM SERPL-MCNC: 8.5 MG/DL (ref 8.7–10.5)
CHLORIDE SERPL-SCNC: 96 MMOL/L (ref 95–110)
CO2 SERPL-SCNC: 24 MMOL/L (ref 23–29)
COLOR FLD: YELLOW
CREAT SERPL-MCNC: 1 MG/DL (ref 0.5–1.4)
CV ECHO LV RWT: 0.32 CM
DIFFERENTIAL METHOD: ABNORMAL
DOP CALC LVOT AREA: 3 CM2
DOP CALC LVOT DIAMETER: 1.96 CM
ECHO LV POSTERIOR WALL: 0.57 CM (ref 0.6–1.1)
EOSINOPHIL # BLD AUTO: 0 K/UL (ref 0–0.5)
EOSINOPHIL NFR BLD: 0.3 % (ref 0–8)
ERYTHROCYTE [DISTWIDTH] IN BLOOD BY AUTOMATED COUNT: 17.2 % (ref 11.5–14.5)
EST. GFR  (AFRICAN AMERICAN): >60 ML/MIN/1.73 M^2
EST. GFR  (NON AFRICAN AMERICAN): >60 ML/MIN/1.73 M^2
FRACTIONAL SHORTENING: 32 % (ref 28–44)
GLUCOSE FLD-MCNC: <5 MG/DL
GLUCOSE SERPL-MCNC: 303 MG/DL (ref 70–110)
HCT VFR BLD AUTO: 26.9 % (ref 40–54)
HGB BLD-MCNC: 8.2 G/DL (ref 14–18)
IMM GRANULOCYTES # BLD AUTO: 0.1 K/UL (ref 0–0.04)
IMM GRANULOCYTES NFR BLD AUTO: 1.1 % (ref 0–0.5)
INTERVENTRICULAR SEPTUM: 0.88 CM (ref 0.6–1.1)
LDH FLD L TO P-CCNC: 3736 U/L
LEFT ATRIUM SIZE: 2.68 CM
LEFT INTERNAL DIMENSION IN SYSTOLE: 2.41 CM (ref 2.1–4)
LEFT VENTRICLE DIASTOLIC VOLUME INDEX: 30.38 ML/M2
LEFT VENTRICLE DIASTOLIC VOLUME: 52.38 ML
LEFT VENTRICLE MASS INDEX: 39 G/M2
LEFT VENTRICLE SYSTOLIC VOLUME INDEX: 11.8 ML/M2
LEFT VENTRICLE SYSTOLIC VOLUME: 20.28 ML
LEFT VENTRICULAR INTERNAL DIMENSION IN DIASTOLE: 3.54 CM (ref 3.5–6)
LEFT VENTRICULAR MASS: 67.07 G
LYMPHOCYTES # BLD AUTO: 1.6 K/UL (ref 1–4.8)
LYMPHOCYTES NFR BLD: 17.8 % (ref 18–48)
LYMPHOCYTES NFR FLD MANUAL: 1 %
MAGNESIUM SERPL-MCNC: 2 MG/DL (ref 1.6–2.6)
MCH RBC QN AUTO: 25.8 PG (ref 27–31)
MCHC RBC AUTO-ENTMCNC: 30.5 G/DL (ref 32–36)
MCV RBC AUTO: 85 FL (ref 82–98)
MONOCYTES # BLD AUTO: 1.2 K/UL (ref 0.3–1)
MONOCYTES NFR BLD: 13.3 % (ref 4–15)
MONOS+MACROS NFR FLD MANUAL: 0 %
NEUTROPHILS # BLD AUTO: 6.2 K/UL (ref 1.8–7.7)
NEUTROPHILS NFR BLD: 67.3 % (ref 38–73)
NEUTROPHILS NFR FLD MANUAL: 99 %
NRBC BLD-RTO: 0 /100 WBC
PHOSPHATE SERPL-MCNC: 2.6 MG/DL (ref 2.7–4.5)
PLATELET # BLD AUTO: 367 K/UL (ref 150–350)
PMV BLD AUTO: 9.4 FL (ref 9.2–12.9)
POCT GLUCOSE: 115 MG/DL (ref 70–110)
POCT GLUCOSE: 130 MG/DL (ref 70–110)
POCT GLUCOSE: 306 MG/DL (ref 70–110)
POCT GLUCOSE: 317 MG/DL (ref 70–110)
POCT GLUCOSE: 45 MG/DL (ref 70–110)
POCT GLUCOSE: 50 MG/DL (ref 70–110)
POCT GLUCOSE: 62 MG/DL (ref 70–110)
POTASSIUM SERPL-SCNC: 4.9 MMOL/L (ref 3.5–5.1)
PROT FLD-MCNC: 5.7 G/DL
PROT SERPL-MCNC: 7.4 G/DL (ref 6–8.4)
PROT SERPL-MCNC: 7.9 G/DL (ref 6–8.4)
RA PRESSURE: 3 MMHG
RBC # BLD AUTO: 3.18 M/UL (ref 4.6–6.2)
SINUS: 2.41 CM
SODIUM SERPL-SCNC: 128 MMOL/L (ref 136–145)
SPECIMEN SOURCE: NORMAL
STJ: 2.81 CM
TROPONIN I SERPL DL<=0.01 NG/ML-MCNC: 0.02 NG/ML (ref 0–0.03)
VANCOMYCIN TROUGH SERPL-MCNC: 12.9 UG/ML (ref 10–22)
WBC # BLD AUTO: 9.18 K/UL (ref 3.9–12.7)
WBC # FLD: NORMAL /CU MM

## 2020-03-29 PROCEDURE — 99223 PR INITIAL HOSPITAL CARE,LEVL III: ICD-10-PCS | Mod: ,,, | Performed by: INTERNAL MEDICINE

## 2020-03-29 PROCEDURE — A4217 STERILE WATER/SALINE, 500 ML: HCPCS | Performed by: STUDENT IN AN ORGANIZED HEALTH CARE EDUCATION/TRAINING PROGRAM

## 2020-03-29 PROCEDURE — 87116 MYCOBACTERIA CULTURE: CPT | Mod: 59

## 2020-03-29 PROCEDURE — 84100 ASSAY OF PHOSPHORUS: CPT

## 2020-03-29 PROCEDURE — 87206 SMEAR FLUORESCENT/ACID STAI: CPT

## 2020-03-29 PROCEDURE — 87102 FUNGUS ISOLATION CULTURE: CPT

## 2020-03-29 PROCEDURE — 87070 CULTURE OTHR SPECIMN AEROBIC: CPT

## 2020-03-29 PROCEDURE — 27000221 HC OXYGEN, UP TO 24 HOURS

## 2020-03-29 PROCEDURE — 99233 SBSQ HOSP IP/OBS HIGH 50: CPT | Mod: ,,, | Performed by: INTERNAL MEDICINE

## 2020-03-29 PROCEDURE — 32556 INSERT CATH PLEURA W/O IMAGE: CPT | Mod: RT,,, | Performed by: INTERNAL MEDICINE

## 2020-03-29 PROCEDURE — 25000242 PHARM REV CODE 250 ALT 637 W/ HCPCS: Performed by: STUDENT IN AN ORGANIZED HEALTH CARE EDUCATION/TRAINING PROGRAM

## 2020-03-29 PROCEDURE — 25000003 PHARM REV CODE 250: Performed by: STUDENT IN AN ORGANIZED HEALTH CARE EDUCATION/TRAINING PROGRAM

## 2020-03-29 PROCEDURE — 87070 CULTURE OTHR SPECIMN AEROBIC: CPT | Mod: 59

## 2020-03-29 PROCEDURE — 20600001 HC STEP DOWN PRIVATE ROOM

## 2020-03-29 PROCEDURE — 63600175 PHARM REV CODE 636 W HCPCS: Performed by: STUDENT IN AN ORGANIZED HEALTH CARE EDUCATION/TRAINING PROGRAM

## 2020-03-29 PROCEDURE — 88112 CYTOPATH CELL ENHANCE TECH: CPT | Performed by: PATHOLOGY

## 2020-03-29 PROCEDURE — 84484 ASSAY OF TROPONIN QUANT: CPT

## 2020-03-29 PROCEDURE — 88312 SPECIAL STAINS GROUP 1: CPT | Mod: 26,,, | Performed by: PATHOLOGY

## 2020-03-29 PROCEDURE — 87015 SPECIMEN INFECT AGNT CONCNTJ: CPT | Mod: 59

## 2020-03-29 PROCEDURE — 82945 GLUCOSE OTHER FLUID: CPT

## 2020-03-29 PROCEDURE — 88305 TISSUE EXAM BY PATHOLOGIST: ICD-10-PCS | Mod: 26,,, | Performed by: PATHOLOGY

## 2020-03-29 PROCEDURE — 87205 SMEAR GRAM STAIN: CPT

## 2020-03-29 PROCEDURE — 63600175 PHARM REV CODE 636 W HCPCS: Performed by: NURSE PRACTITIONER

## 2020-03-29 PROCEDURE — 88312 PR  SPECIAL STAINS,GROUP I: ICD-10-PCS | Mod: 26,,, | Performed by: PATHOLOGY

## 2020-03-29 PROCEDURE — 36415 COLL VENOUS BLD VENIPUNCTURE: CPT

## 2020-03-29 PROCEDURE — 85025 COMPLETE CBC W/AUTO DIFF WBC: CPT

## 2020-03-29 PROCEDURE — 87186 SC STD MICRODIL/AGAR DIL: CPT

## 2020-03-29 PROCEDURE — 88305 TISSUE EXAM BY PATHOLOGIST: CPT | Mod: 26,,, | Performed by: PATHOLOGY

## 2020-03-29 PROCEDURE — 89051 BODY FLUID CELL COUNT: CPT

## 2020-03-29 PROCEDURE — 82042 OTHER SOURCE ALBUMIN QUAN EA: CPT

## 2020-03-29 PROCEDURE — 88112 PR  CYTOPATH, CELL ENHANCE TECH: ICD-10-PCS | Mod: 26,,, | Performed by: PATHOLOGY

## 2020-03-29 PROCEDURE — 25000003 PHARM REV CODE 250: Performed by: PHYSICIAN ASSISTANT

## 2020-03-29 PROCEDURE — 32556 CHEST TUBE INSERTION: ICD-10-PCS | Mod: RT,,, | Performed by: INTERNAL MEDICINE

## 2020-03-29 PROCEDURE — 99233 SBSQ HOSP IP/OBS HIGH 50: CPT | Mod: GC,,, | Performed by: STUDENT IN AN ORGANIZED HEALTH CARE EDUCATION/TRAINING PROGRAM

## 2020-03-29 PROCEDURE — 84155 ASSAY OF PROTEIN SERUM: CPT

## 2020-03-29 PROCEDURE — 83735 ASSAY OF MAGNESIUM: CPT

## 2020-03-29 PROCEDURE — 82150 ASSAY OF AMYLASE: CPT

## 2020-03-29 PROCEDURE — 87040 BLOOD CULTURE FOR BACTERIA: CPT | Mod: 59

## 2020-03-29 PROCEDURE — 87205 SMEAR GRAM STAIN: CPT | Mod: 59

## 2020-03-29 PROCEDURE — 63600175 PHARM REV CODE 636 W HCPCS

## 2020-03-29 PROCEDURE — 87116 MYCOBACTERIA CULTURE: CPT

## 2020-03-29 PROCEDURE — 25000003 PHARM REV CODE 250: Performed by: INTERNAL MEDICINE

## 2020-03-29 PROCEDURE — 84157 ASSAY OF PROTEIN OTHER: CPT

## 2020-03-29 PROCEDURE — 99233 PR SUBSEQUENT HOSPITAL CARE,LEVL III: ICD-10-PCS | Mod: ,,, | Performed by: INTERNAL MEDICINE

## 2020-03-29 PROCEDURE — 83615 LACTATE (LD) (LDH) ENZYME: CPT

## 2020-03-29 PROCEDURE — 25500020 PHARM REV CODE 255: Performed by: STUDENT IN AN ORGANIZED HEALTH CARE EDUCATION/TRAINING PROGRAM

## 2020-03-29 PROCEDURE — 88305 TISSUE EXAM BY PATHOLOGIST: CPT | Performed by: PATHOLOGY

## 2020-03-29 PROCEDURE — 80053 COMPREHEN METABOLIC PANEL: CPT

## 2020-03-29 PROCEDURE — 25000003 PHARM REV CODE 250: Performed by: EMERGENCY MEDICINE

## 2020-03-29 PROCEDURE — 84311 SPECTROPHOTOMETRY: CPT

## 2020-03-29 PROCEDURE — 88312 SPECIAL STAINS GROUP 1: CPT | Performed by: PATHOLOGY

## 2020-03-29 PROCEDURE — 99233 PR SUBSEQUENT HOSPITAL CARE,LEVL III: ICD-10-PCS | Mod: GC,,, | Performed by: STUDENT IN AN ORGANIZED HEALTH CARE EDUCATION/TRAINING PROGRAM

## 2020-03-29 PROCEDURE — 87077 CULTURE AEROBIC IDENTIFY: CPT | Mod: 59

## 2020-03-29 PROCEDURE — 88112 CYTOPATH CELL ENHANCE TECH: CPT | Mod: 26,,, | Performed by: PATHOLOGY

## 2020-03-29 PROCEDURE — 99223 1ST HOSP IP/OBS HIGH 75: CPT | Mod: ,,, | Performed by: INTERNAL MEDICINE

## 2020-03-29 RX ORDER — OXYCODONE HYDROCHLORIDE 5 MG/1
5 TABLET ORAL ONCE
Status: DISCONTINUED | OUTPATIENT
Start: 2020-03-29 | End: 2020-03-30

## 2020-03-29 RX ORDER — DEXTROSE 40 %
15 GEL (GRAM) ORAL
Status: DISCONTINUED | OUTPATIENT
Start: 2020-03-29 | End: 2020-04-22 | Stop reason: HOSPADM

## 2020-03-29 RX ORDER — HYDROMORPHONE HYDROCHLORIDE 1 MG/ML
1 INJECTION, SOLUTION INTRAMUSCULAR; INTRAVENOUS; SUBCUTANEOUS ONCE
Status: DISCONTINUED | OUTPATIENT
Start: 2020-03-29 | End: 2020-03-30

## 2020-03-29 RX ORDER — OXYCODONE HYDROCHLORIDE 5 MG/1
10 TABLET ORAL EVERY 4 HOURS PRN
Status: DISCONTINUED | OUTPATIENT
Start: 2020-03-29 | End: 2020-04-08

## 2020-03-29 RX ORDER — MORPHINE SULFATE 2 MG/ML
2 INJECTION, SOLUTION INTRAMUSCULAR; INTRAVENOUS ONCE
Status: COMPLETED | OUTPATIENT
Start: 2020-03-29 | End: 2020-03-29

## 2020-03-29 RX ORDER — HYDROMORPHONE HYDROCHLORIDE 2 MG/ML
INJECTION, SOLUTION INTRAMUSCULAR; INTRAVENOUS; SUBCUTANEOUS
Status: COMPLETED
Start: 2020-03-29 | End: 2020-03-29

## 2020-03-29 RX ADMIN — INSULIN ASPART 7 UNITS: 100 INJECTION, SOLUTION INTRAVENOUS; SUBCUTANEOUS at 12:03

## 2020-03-29 RX ADMIN — OXYCODONE HYDROCHLORIDE 10 MG: 10 TABLET ORAL at 11:03

## 2020-03-29 RX ADMIN — CEFEPIME 2 G: 2 INJECTION, POWDER, FOR SOLUTION INTRAVENOUS at 08:03

## 2020-03-29 RX ADMIN — ACETAMINOPHEN 650 MG: 325 TABLET ORAL at 01:03

## 2020-03-29 RX ADMIN — Medication 15000 MG: at 10:03

## 2020-03-29 RX ADMIN — ACETAMINOPHEN 650 MG: 325 TABLET ORAL at 11:03

## 2020-03-29 RX ADMIN — OXYCODONE HYDROCHLORIDE 10 MG: 10 TABLET ORAL at 03:03

## 2020-03-29 RX ADMIN — OXYCODONE HYDROCHLORIDE 5 MG: 5 TABLET ORAL at 08:03

## 2020-03-29 RX ADMIN — HYDROMORPHONE HYDROCHLORIDE 1 MG: 2 INJECTION INTRAMUSCULAR; INTRAVENOUS; SUBCUTANEOUS at 09:03

## 2020-03-29 RX ADMIN — ALTEPLASE 10 MG: 2.2 INJECTION, POWDER, LYOPHILIZED, FOR SOLUTION INTRAVENOUS at 09:03

## 2020-03-29 RX ADMIN — SODIUM CHLORIDE 250 ML: 0.9 INJECTION, SOLUTION INTRAVENOUS at 08:03

## 2020-03-29 RX ADMIN — INSULIN ASPART 8 UNITS: 100 INJECTION, SOLUTION INTRAVENOUS; SUBCUTANEOUS at 12:03

## 2020-03-29 RX ADMIN — VANCOMYCIN HYDROCHLORIDE 1000 MG: 1 INJECTION, POWDER, LYOPHILIZED, FOR SOLUTION INTRAVENOUS at 01:03

## 2020-03-29 RX ADMIN — ACETAMINOPHEN 650 MG: 325 TABLET ORAL at 09:03

## 2020-03-29 RX ADMIN — VANCOMYCIN HYDROCHLORIDE 1000 MG: 1 INJECTION, POWDER, LYOPHILIZED, FOR SOLUTION INTRAVENOUS at 12:03

## 2020-03-29 RX ADMIN — MORPHINE SULFATE 2 MG: 2 INJECTION, SOLUTION INTRAMUSCULAR; INTRAVENOUS at 08:03

## 2020-03-29 RX ADMIN — IOHEXOL 75 ML: 350 INJECTION, SOLUTION INTRAVENOUS at 05:03

## 2020-03-29 RX ADMIN — OXYCODONE HYDROCHLORIDE 5 MG: 5 TABLET ORAL at 04:03

## 2020-03-29 RX ADMIN — INSULIN ASPART 8 UNITS: 100 INJECTION, SOLUTION INTRAVENOUS; SUBCUTANEOUS at 08:03

## 2020-03-29 RX ADMIN — INSULIN ASPART 7 UNITS: 100 INJECTION, SOLUTION INTRAVENOUS; SUBCUTANEOUS at 08:03

## 2020-03-29 RX ADMIN — DORNASE ALFA 5 MG: 1 SOLUTION RESPIRATORY (INHALATION) at 09:03

## 2020-03-29 RX ADMIN — CEFEPIME 2 G: 2 INJECTION, POWDER, FOR SOLUTION INTRAVENOUS at 04:03

## 2020-03-29 RX ADMIN — OXYCODONE HYDROCHLORIDE 10 MG: 10 TABLET ORAL at 12:03

## 2020-03-29 RX ADMIN — CEFEPIME 2 G: 2 INJECTION, POWDER, FOR SOLUTION INTRAVENOUS at 11:03

## 2020-03-29 RX ADMIN — DEXTROSE 250 ML: 10 SOLUTION INTRAVENOUS at 11:03

## 2020-03-29 RX ADMIN — ATORVASTATIN CALCIUM 20 MG: 20 TABLET, FILM COATED ORAL at 08:03

## 2020-03-29 RX ADMIN — DEXTROSE 125 ML: 10 SOLUTION INTRAVENOUS at 05:03

## 2020-03-29 NOTE — SUBJECTIVE & OBJECTIVE
Interval History:  Fever to 101.2 today.  CXR worsening from admit.        Review of Systems   Constitutional: Positive for activity change and appetite change (decreased appetite). Negative for chills, fever and unexpected weight change.   HENT: Negative.    Eyes: Negative.    Respiratory: Positive for cough (dry cough) and chest tightness (chest pain with inspiration). Negative for shortness of breath and wheezing.    Cardiovascular: Negative for palpitations and leg swelling.   Gastrointestinal: Negative for abdominal pain, constipation, diarrhea, nausea and vomiting.   Genitourinary: Negative for difficulty urinating and dysuria.   Musculoskeletal: Negative for arthralgias, back pain and myalgias.   Skin: Negative for rash and wound.   Neurological: Negative for dizziness, weakness and headaches.   Hematological: Negative for adenopathy.   Psychiatric/Behavioral: Negative for agitation and behavioral problems.     Objective:     Vital Signs (Most Recent):  Temp: 99.8 °F (37.7 °C) (03/29/20 0752)  Pulse: 110 (03/29/20 0925)  Resp: (!) 25 (03/29/20 0925)  BP: (!) 145/85 (03/29/20 0925)  SpO2: (!) 93 % (03/29/20 0925) Vital Signs (24h Range):  Temp:  [98.9 °F (37.2 °C)-100.3 °F (37.9 °C)] 99.8 °F (37.7 °C)  Pulse:  [] 110  Resp:  [18-27] 25  SpO2:  [88 %-98 %] 93 %  BP: ()/(59-94) 145/85     Weight: 60.8 kg (134 lb 0.6 oz)  Body mass index is 20.38 kg/m².    Estimated Creatinine Clearance: 73.5 mL/min (based on SCr of 1 mg/dL).    Physical Exam   Constitutional: He is oriented to person, place, and time. He appears well-developed. No distress.   Thin   HENT:   Head: Normocephalic and atraumatic.   Cardiovascular: Normal rate.   Pulmonary/Chest: Effort normal. No respiratory distress.   Neurological: He is alert and oriented to person, place, and time.   Skin: He is not diaphoretic.   Vitals reviewed.    Due to COVID-19 pandemic, ID team is limiting interaction with new patients unless absolutely  necessary to limit patient exposure.   Patient observed through room window, history/ROS obtained by phone conversation with patient.     Significant Labs:   Blood Culture:   Recent Labs   Lab 03/01/20  0835 03/01/20  0842 03/03/20  1925 03/03/20  1931 03/26/20  2157   LABBLOO No growth after 5 days. No growth after 5 days. No growth after 5 days. No growth after 5 days. No Growth to date  No Growth to date  No Growth to date  No Growth to date  No Growth to date  No Growth to date     CBC:   Recent Labs   Lab 03/27/20  1148 03/28/20  1112 03/29/20  0702   WBC 11.36 13.58* 9.18   HGB 8.5* 8.6* 8.2*   HCT 28.2* 28.4* 26.9*   * 357* 367*     CMP:   Recent Labs   Lab 03/27/20  1148 03/28/20  1112 03/29/20  0702   * 129* 128*   K 5.0 4.6 4.9   CL 97 96 96   CO2 23 24 24   * 214* 303*   BUN 17 14 16   CREATININE 1.1 1.0 1.0   CALCIUM 8.1* 8.4* 8.5*   PROT 9.0* 7.8 7.4   ALBUMIN 2.2* 1.7* 1.6*   BILITOT 0.7 0.6 0.5   ALKPHOS 97 102 94   AST 18 10 11   ALT 10 9* 10   ANIONGAP 11 9 8   EGFRNONAA >60.0 >60.0 >60.0     Respiratory Culture:   Recent Labs   Lab 02/23/20  0200   GSRESP <10 epithelial cells per low power field.  Moderate WBC's  Moderate Gram positive cocci in clusters  Few Gram positive rods   RESPIRATORYC ESCHERICHIA COLI  Many  *  METHICILLIN RESISTANT STAPHYLOCOCCUS AUREUS  Many  *       Significant Imaging: I have reviewed all pertinent imaging results/findings within the past 24 hours.

## 2020-03-29 NOTE — ASSESSMENT & PLAN NOTE
Multifocal Bacterial pneumonia  Cavitary lung lesions  -Dense consolidative changes are visualized within the posterior aspect of the right lower lobe.    -Internal cavitation with air and fluid seen, decreased in size from previous CT.    -Occlusion is seen of right lower lobe bronchi.  Airways are otherwise patent.    -Multifocal opacities and cavitations are seen within the lungs  -ID consulted and disucssed case with them will switch to cefepime and vanc and try to rule out TB  - FEVER 3/29 will get blood cultures and discuss with ID   -Quantiferon gold pending  -COVID testing 3/26/20 - NEGATIVE  -dextromethorphan-guaifenesin  mg/5 ml liquid 10 mL, 10 mL, Oral, Q4H PRN  -albuterol-ipratropium 2.5 mg-0.5 mg/3 mL nebulizer solution 3 mL, 3 mL, Nebulization, Q4H PRN

## 2020-03-29 NOTE — PLAN OF CARE
"- Patient admitted 3/26/20 with complaints of shortness of breath and right-sided chest pain.  - Imaging (chest X-rays; CT of chest, abd, and pelvis) notable for right basilar patchy infiltrate and right lower lobe cavitation containing air and fluid, with occlusion of the right lower lobe bronchi, and a small right pneumothorax.  - Quantiferon Gold to be drawn Monday; test not available on weekends.  - Blood cx 3/26 NGTD; flu swab negative; respiratory viral panel in process.  - Lactic acid 2.6; procalcitonin 10.09; CRP 50.3; D-dimer 3.42.  - Patient was swabbed for COVID-19 which resulted not detected. Airborne+contact+droplet precautions continued per provider order, as patient is reportedly considered high risk for COVID-19 and possibly has a false negative swab. Patient also has to be on airborne isolation as a rule-out TB patient.  - Overnight, patient has been having persistent low-grade temps up 100.3, mild tachypnea (RR 24-28), and is on 2 L nasal cannula to keep SpO2 >= 92%. SpO2 dropped to 88% on room air.  - Patient c/o "8" to "9" out of 10 right-sided pain, primarily in right lateral chest but also extending to right abdomen. Oxycodone given PRN per order. Patient requesting pain medication multiple times in advance of time available. Tylenol given; hot packs applied to painful areas. Encouraged patient to discuss analgesic regimen with providers today.  - Patient hypoglycemic overnight (BG 58). D10 bolus given. Encouraged patient to eat; he states his pain becomes worse post-prandially, therefore he is not eating much. He ate part of a turkey sandwich and some peaches. Insulin dosages likely need to be reduced as he was hypoglycemic twice yesterday.  - IV abx: Cefepime q8h; vancomycin q12h. Vanc trough checked - 12.9.  - Patient has an extensive IVDA history. Echo ordered to assess for vegetations.  - Patient has several sputum samples ordered - unable to collect. Patient has infrequent non-productive " "cough.  - Patient is very irritable and somewhat difficult to deal with, using profanity during all interactions. Pt states he "can't wait to leave this hospital" but also states he "feels terrible - it hurts to breathe" and he "doesn't want to die."  - Pt was without IV access for several hours during the evening due to accidentally self-removing his ultrasound-assisted PIV. New IV successfully placed after much time spent by multiple RNs.  - Chest X-ray to be taken this morning.  "

## 2020-03-29 NOTE — NURSING
- Patient is hypoglycemic for the 2nd time in the last ~5 hours; he is asymptomatic.  - Brought patient multiple food items and encouraged him to eat.  - Also infused D10 per PRN orders as patient has 24 Units of Novolog and 15 Units of Levemir still in his system; his insulin needs have likely decreased.  - Will continue to monitor.    Results for AN CHOUDHURY (MRN [id number])   Ref. Range 3/28/2020 21:09   POCT Glucose Latest Ref Range: 70 - 110 mg/dL 58 (L)

## 2020-03-29 NOTE — ASSESSMENT & PLAN NOTE
-oxyCODONE immediate release pyutlc23 mg as patient his pain is no controlled PRN, moderate pain

## 2020-03-29 NOTE — PROGRESS NOTES
Rechecked blood sugar = 115. EKG ordered. Pain pill administered. Consent signed for CT placement tonight .

## 2020-03-29 NOTE — PLAN OF CARE
"-patient is Aaox4. Cardiac monitoring in progress - NSR to ST.   -Tmax 101.2. Tylenol administered. Repeat Blood cultures drawn. Vanc and Cef continued. Droplet and airborne precautions maintained. Proper hand hygiene performed before and after patient care activities.  -CXR and Echo performed. Waiting for CT to be obtained.   -O2 sats maintained 92-97% on 2L NC. Patient denies SOB, states "hurts to breath"   -C/o pain 8/10 throughout shift. PRN oxy 10 mg po given with mild relief obtained.   -Blood glucose being monitored AC/HS. Mealtime and supplemental insulin needed this shift.  -Patient remained free from falls and injury. Bed locked and in lowest position, call light within reach, non skid socks on. Instructed to call staff for assistance and patient verbalized understanding. Will continue to monitor.   "

## 2020-03-29 NOTE — PROGRESS NOTES
Pharmacokinetic Assessment Follow Up: IV Vancomycin    Vancomycin serum concentration assessment(s):    The trough level was drawn correctly and can be used to guide therapy at this time. The measurement is within the desired definitive target range of 10 to 20 mcg/mL.    Vancomycin Regimen Plan:    Continue regimen to Vancomycin 1000 mg IV every 12 hours with next serum trough concentration measured at 1230 prior to 4th dose on 3/30    Drug levels (last 3 results):  Recent Labs   Lab Result Units 03/27/20 1148 03/28/20  2344   Vancomycin, Random ug/mL 10.6  --    Vancomycin-Trough ug/mL  --  12.9       Pharmacy will continue to follow and monitor vancomycin.    Please contact pharmacy at extension [phone] for questions regarding this assessment.    Thank you for the consult,   Cullen Metz       Patient brief summary:  Cali Mabry is a 54 y.o. male initiated on antimicrobial therapy with IV Vancomycin for treatment of pneumonia    The patient's current regimen is 1000mg every 12 hours    Drug Allergies:   Review of patient's allergies indicates:  No Known Allergies    Actual Body Weight:   60.2kg    Renal Function:   Estimated Creatinine Clearance: 72.7 mL/min (based on SCr of 1 mg/dL).,     Dialysis Method (if applicable):  N/A    CBC (last 72 hours):  Recent Labs   Lab Result Units 03/26/20 2058 03/27/20  1148 03/28/20  1112   WBC K/uL 20.17* 11.36 13.58*   Hemoglobin g/dL 10.0* 8.5* 8.6*   Hematocrit % 32.9* 28.2* 28.4*   Platelets K/uL 473* 412* 357*   Gran% % 82.2* 79.3* 79.3*   Lymph% % 11.9* 13.6* 9.7*   Mono% % 4.6 6.2 10.1   Eosinophil% % 0.1 0.0 0.0   Basophil% % 0.2 0.2 0.1   Differential Method  Automated Automated Automated       Metabolic Panel (last 72 hours):  Recent Labs   Lab Result Units 03/26/20 2058 03/27/20  0346 03/27/20  1148 03/28/20  1015 03/28/20  1112   Sodium mmol/L 132*  --  131*  --  129*   Potassium mmol/L 4.4  --  5.0  --  4.6   Chloride mmol/L 97  --  97  --  96   CO2  mmol/L 20*  --  23  --  24   Glucose mg/dL 180*  --  232*  --  214*   Glucose, UA   --  Negative  --   --   --    BUN, Bld mg/dL 23*  --  17  --  14   Creatinine mg/dL 1.5*  --  1.1  --  1.0   Creatinine, Random Ur mg/dL  --   --   --  76.0  --    Albumin g/dL 2.7*  --  2.2*  --  1.7*   Total Bilirubin mg/dL 0.3  --  0.7  --  0.6   Alkaline Phosphatase U/L 117  --  97  --  102   AST U/L 11  --  18  --  10   ALT U/L 10  --  10  --  9*   Magnesium mg/dL 2.0  --  1.8  --  1.9   Phosphorus mg/dL 4.7*  --  2.8  --  2.9       Vancomycin Administrations:  vancomycin given in the last 96 hours                   vancomycin in dextrose 5 % 1 gram/250 mL IVPB 1,000 mg (mg) 1,000 mg New Bag 03/29/20 0100     1,000 mg New Bag 03/28/20 1632    vancomycin in dextrose 5 % 1 gram/250 mL IVPB 1,000 mg (mg) 1,000 mg New Bag 03/28/20 0040     1,000 mg New Bag 03/27/20 1219                Microbiologic Results:  Microbiology Results (last 7 days)     Procedure Component Value Units Date/Time    Blood culture x two cultures. Draw prior to antibiotics. [[id number]] Collected:  03/26/20 2157    Order Status:  Completed Specimen:  Blood from Peripheral, Hand, Left Updated:  03/28/20 2303     Blood Culture, Routine No Growth to date      No Growth to date      No Growth to date    Narrative:       Aerobic and anaerobic    Blood culture x two cultures. Draw prior to antibiotics. [[id number]] Collected:  03/26/20 2157    Order Status:  Completed Specimen:  Blood from Peripheral, Hand, Left Updated:  03/28/20 2303     Blood Culture, Routine No Growth to date      No Growth to date      No Growth to date    Narrative:       Aerobic and anaerobic    AFB Culture & Smear [[id number]]     Order Status:  No result Specimen:  Respiratory from Sputum, Expectorated     AFB Culture & Smear [[id number]]     Order Status:  No result Specimen:  Respiratory from Sputum, Expectorated     Respiratory Viral Panel by PCR [facility]; Nasal Swab [[id number]] Collected:   03/28/20 1252    Order Status:  Sent Specimen:  Respiratory Updated:  03/28/20 1317    AFB Culture & Smear [[id number]]     Order Status:  No result Specimen:  Sputum     Influenza A & B by Molecular [[id number]] Collected:  03/27/20 1148    Order Status:  Completed Specimen:  Nasopharyngeal Swab Updated:  03/27/20 1248     Influenza A, Molecular Negative     Influenza B, Molecular Negative     Flu A & B Source Nasal swab    Culture, Respiratory with Gram Stain [[id number]]     Order Status:  No result Specimen:  Sputum, Expectorated

## 2020-03-29 NOTE — PROGRESS NOTES
Patient returned from CT. HR being sustained in the 120s. C/o pain 8/10 to R. Chest. Pt tachypneic and breathing is labored, with reports of some SOB. O2 sats 91% on 2L NC. Blood sugar 62. D10 infusing per PRN order. IM1 paged, orders to be put in. Will continue to monitor.

## 2020-03-29 NOTE — PLAN OF CARE
Consult received. Chart and imaging reviewed. Patient with what appears to be an empyema. Needs to be drained.     Patient consented for chest tube placement, will perform this afternoon. Updated his family about procedure.      Full consult note to follow.     Sarai Hurley MD  Pulmonary / Critical Care Fellow, PGY4  #[phone]  03/29/2020  6:41 PM

## 2020-03-29 NOTE — PROGRESS NOTES
"[facility] Hospital Medicine  Progress Note    Patient Name: Cali Mabry  MRN: [id number]  Patient Class: IP- Inpatient   Admission Date: 3/26/2020  Length of Stay: 2 days  Attending Physician: Jered Diehl MD  Primary Care Provider: Primary Doctor No    Hospital Medicine Team: [facility] 1 Kelsey Gomez MD    Subjective:     Principal Problem:Acute respiratory failure with hypoxia        HPI:  Cali Mabry is a 54 y.o. with DM II, hepatitis C, HTN, polysubstance abuse/IVDU, pancreatitis who presents, after transfer from [facility], with a 2 day history of chest pain and shortness of breath.  Chest pain is centralized and just described as "painful." Pain aggravated with movement and deep breathing. He reports an intermittent cough that is non-productive. He denies sick contacts, fever, chills, sore throat. Upon questioning, patient stated "I have the virus so just treat me for it." Educated patient that testing is still in process. Inquired about IV drug use however he was unable to recall when he last used. Patient swatted examiner's hand after chest palpation, yelling "that hurts!" Patient became aggravated, reported his pain was now worse and requested pain medication. Per nurse, patient given PRN dose an hour prior. Patient encouraged to continue discussing his plan of care however stated, "I don't want to chat."   ED: Afebrile with leukocytosis. CTA chest from 3/26 with dense consolidative changes within the right lower lobe with internal cavitation containing air and fluid; Occlusion is seen of the right lower lobe bronchi which may be secondary to mucoid impaction or aspirated material; Additional multifocal opacities and cavitations.  Vancomycin and Zosyn administered. On 2L NC. VSS. Admitted to hospital medicine for further evaluation and COVID-19 rule out.        Overview/Hospital Course:  Patient admitted for COVID rule out. Covid -Was stepped to non covid team " 3/28. Last fever was 3/28 at 3 am, sating well on 2 L. Patient mentation improved    Interval History: Patient overnight wanting more pain medication. Had a fever of 101.2.  Review of Systems  Objective:     Vital Signs (Most Recent):  Temp: (!) 101.2 °F (38.4 °C) (03/29/20 1159)  Pulse: 104 (03/29/20 1129)  Resp: (!) 29 (03/29/20 1129)  BP: 120/70 (03/29/20 1129)  SpO2: 96 % (03/29/20 1129) Vital Signs (24h Range):  Temp:  [98.9 °F (37.2 °C)-101.2 °F (38.4 °C)] 101.2 °F (38.4 °C)  Pulse:  [] 104  Resp:  [18-29] 29  SpO2:  [88 %-98 %] 96 %  BP: ()/(59-94) 120/70     Weight: 60.8 kg (134 lb 0.6 oz)  Body mass index is 20.38 kg/m².    Intake/Output Summary (Last 24 hours) at 3/29/2020 1205  Last data filed at 3/29/2020 1203  Gross per 24 hour   Intake 1145 ml   Output 650 ml   Net 495 ml      Physical Exam   Constitutional: He is oriented to person, place, and time. He appears well-developed and well-nourished. No distress.   Neck: Normal range of motion.   Cardiovascular: Regular rhythm.   Pulmonary/Chest: No respiratory distress.   Musculoskeletal: Normal range of motion.   Neurological: He is alert and oriented to person, place, and time.   Skin: Skin is warm and dry.   Psychiatric: He has a normal mood and affect. His behavior is normal. Judgment and thought content normal.       Significant Labs:   CBC:   Recent Labs   Lab 03/28/20  1112 03/29/20  0702   WBC 13.58* 9.18   HGB 8.6* 8.2*   HCT 28.4* 26.9*   * 367*     CMP:   Recent Labs   Lab 03/28/20  1112 03/29/20  0702   * 128*   K 4.6 4.9   CL 96 96   CO2 24 24   * 303*   BUN 14 16   CREATININE 1.0 1.0   CALCIUM 8.4* 8.5*   PROT 7.8 7.4   ALBUMIN 1.7* 1.6*   BILITOT 0.6 0.5   ALKPHOS 102 94   AST 10 11   ALT 9* 10   ANIONGAP 9 8   EGFRNONAA >60.0 >60.0     All pertinent labs within the past 24 hours have been reviewed.    Significant Imaging: I have reviewed all pertinent imaging results/findings within the past 24 hours.  I have  reviewed and interpreted all pertinent imaging results/findings within the past 24 hours.      Assessment/Plan:      * Acute respiratory failure with hypoxia  Multifocal Bacterial pneumonia  Cavitary lung lesions  -Dense consolidative changes are visualized within the posterior aspect of the right lower lobe.    -Internal cavitation with air and fluid seen, decreased in size from previous CT.    -Occlusion is seen of right lower lobe bronchi.  Airways are otherwise patent.    -Multifocal opacities and cavitations are seen within the lungs  -ID consulted and disucssed case with them will switch to cefepime and vanc and try to rule out TB  - FEVER 3/29 will get blood cultures and discuss with ID   -Quantiferon gold pending  -COVID testing 3/26/20 - NEGATIVE  -dextromethorphan-guaifenesin  mg/5 ml liquid 10 mL, 10 mL, Oral, Q4H PRN  -albuterol-ipratropium 2.5 mg-0.5 mg/3 mL nebulizer solution 3 mL, 3 mL, Nebulization, Q4H PRN      Chest pain      -oxyCODONE immediate release [id number] mg as patient his pain is no controlled PRN, moderate pain      Intravenous drug abuse    -counseled on cessation      Hyperlipidemia  - continue statin       Essential hypertension  - holding any BP meds      Type 2 diabetes mellitus, uncontrolled  -HbA1c 11  - required some D10 instructed nurse to encourage him to eat hold meal time insulin   -insulin aspart U-100 pen 1-10 Units, 1-10 Units, Subcutaneous, QID (AC + HS) PRN               -this is moderate dose  -Increase insulin aspart U-100 increase to 7 units   -Increase insulin detemir U-100 pen to  20 Units               -Home dose is Lantus 30 U Qday           VTE Risk Mitigation (From admission, onward)         Ordered     enoxaparin injection 40 mg  Daily      03/27/20 1029     IP VTE HIGH RISK PATIENT  Once      03/27/20 1511                      Kelsey Gomez MD  Department of Hospital Medicine   [facility]

## 2020-03-29 NOTE — ASSESSMENT & PLAN NOTE
-HbA1c 11  - required some D10 instructed nurse to encourage him to eat hold meal time insulin   -insulin aspart U-100 pen 1-10 Units, 1-10 Units, Subcutaneous, QID (AC + HS) PRN               -this is moderate dose  -Increase insulin aspart U-100 increase to 7 units   -Increase insulin detemir U-100 pen to  20 Units               -Home dose is Lantus 30 U Qday

## 2020-03-29 NOTE — ASSESSMENT & PLAN NOTE
54 year old with chronic pancreatitis, DM, HTN, HCV, IVDU transferred from [facility] ED where he presented with right chest and flank pain X 2 days, COVID rule out.  History of recent admission at [facility] from 2/22 to 3/8 with PNA and MRSA bacteremia, treated with 15 days  IV abx (vancomycin and ceftriaxone/zosyn then discharged.                  Afebrile on admit with WBC 20 now down to 9.18.    Procal 10.09, Ferritin 265, ESR 72, CRP 50.  D dimer elevated 3.42.  Last HIV 2/26/20 negative, new HIV pending.                   Flu negative, COVID negative.  Blood cultures remain with NGTD.  Respiratory culture pending, QG pending collection for Monday.  Patient denies any known exposure to TB.  Very remote foreign travel to [facility].  Does have history of incarceration, no homelessness.                  CTA chest on 3/26 was negative for PE but showed dense consolidative changes in RLL with interval cavitation containing air and fluid.  Occlusion seen in RLL bronchi ? Secondary to mucoid impaction or aspirated material. Trace right sided pneumothorax noted.   Noted to have multifocal opacities and cavitations, though improved from CT of 3/4 (last admission).  However, CXR today with significantly increased opacification of right lung - likely combination of airspace disease and pleural fluid.                Spiked fever to 101.2 today.  O2 sats 93-97 on 2 liters nasal cannula.  Denies significant SOB, but persistent right sided chest pain.  2D echo pending - verbal report indicates no vegetation.       Low suspicion for TB or fungal infection at this point, but will rule out.   Suspect unresolved worsening MRSA PNA.  Recommend Pulmonary evaluation in setting of worsening CXR, worsening pleural effusion.     Plan/recommendations:  1.  Continue IV vancomycin.  Pharmacy to dose.  Maintain trough 15-20  2.  Continue cefepime 2 grams IV q 8 hours.   3.  Recommend Pulmonary evaluation as noted above.  4.  Sputum for  AFB q 8 hours X 3 with one early morning collection - pending collection  5.  Sputum for MTB pcr - pending collection  6.  Respiratory culture pending collection.   7.  Maintain airborne isolation  8.  Will follow

## 2020-03-29 NOTE — SUBJECTIVE & OBJECTIVE
Interval History: Patient overnight wanting more pain medication. Had a fever of 101.2.  Review of Systems  Objective:     Vital Signs (Most Recent):  Temp: (!) 101.2 °F (38.4 °C) (03/29/20 1159)  Pulse: 104 (03/29/20 1129)  Resp: (!) 29 (03/29/20 1129)  BP: 120/70 (03/29/20 1129)  SpO2: 96 % (03/29/20 1129) Vital Signs (24h Range):  Temp:  [98.9 °F (37.2 °C)-101.2 °F (38.4 °C)] 101.2 °F (38.4 °C)  Pulse:  [] 104  Resp:  [18-29] 29  SpO2:  [88 %-98 %] 96 %  BP: ()/(59-94) 120/70     Weight: 60.8 kg (134 lb 0.6 oz)  Body mass index is 20.38 kg/m².    Intake/Output Summary (Last 24 hours) at 3/29/2020 1205  Last data filed at 3/29/2020 1203  Gross per 24 hour   Intake 1145 ml   Output 650 ml   Net 495 ml      Physical Exam   Constitutional: He is oriented to person, place, and time. He appears well-developed and well-nourished. No distress.   Neck: Normal range of motion.   Cardiovascular: Regular rhythm.   Pulmonary/Chest: No respiratory distress.   Musculoskeletal: Normal range of motion.   Neurological: He is alert and oriented to person, place, and time.   Skin: Skin is warm and dry.   Psychiatric: He has a normal mood and affect. His behavior is normal. Judgment and thought content normal.       Significant Labs:   CBC:   Recent Labs   Lab 03/28/20  1112 03/29/20  0702   WBC 13.58* 9.18   HGB 8.6* 8.2*   HCT 28.4* 26.9*   * 367*     CMP:   Recent Labs   Lab 03/28/20  1112 03/29/20  0702   * 128*   K 4.6 4.9   CL 96 96   CO2 24 24   * 303*   BUN 14 16   CREATININE 1.0 1.0   CALCIUM 8.4* 8.5*   PROT 7.8 7.4   ALBUMIN 1.7* 1.6*   BILITOT 0.6 0.5   ALKPHOS 102 94   AST 10 11   ALT 9* 10   ANIONGAP 9 8   EGFRNONAA >60.0 >60.0     All pertinent labs within the past 24 hours have been reviewed.    Significant Imaging: I have reviewed all pertinent imaging results/findings within the past 24 hours.  I have reviewed and interpreted all pertinent imaging results/findings within the past 24  hours.

## 2020-03-29 NOTE — CONSULTS
Pulmonary  Medicine  Consult Note    Primary Attending:  Jered Diehl MD   Primary Team: Internal Medicine   Consultant Attending: Dr. Steele   Consultant Fellow: Sarai Hurley MD       Reason for Consult: pleural effusion      History of Present Illness:     Mr. Cali Mabry is a 54 y.o. with DM II, hepatitis C, HTN, polysubstance abuse/IVDU, pancreatitis who presented to [facility] 3[place]26 [place] [facility] [place] with a 2 day history of chest pain and shortness of breath. Recent hospitalization at [facility] for MRSA pneumonia and bacteremia. Admitted to hospital medicine for pneumonia with concern for COVID infection. COVID negative. Since admission, patient on broad spectrum abx and continues to be persistently febrile however WBC does show improvement. Blood cultures remain negative. Being ruled out for TB an covid by primary team. CT chest performed 3/26 showing dense airspace disease as well as possible internal cavitation, with bilateral multifocal opacities/consolidations and trace right PTX. Has remained stable on room air. Had follow up CXR today with development of large right sided pleural effusion. Pulmonary was consulted for management.     ROS: Comprehensive review of systems obtained and is negative except as noted in HPI.    PMHx:   Past Medical History:   Diagnosis Date    Diabetes mellitus     Hepatitis     HTN (hypertension)     Pancreatitis        Home Meds:   No current facility-administered medications on file prior to encounter.      Current Outpatient Medications on File Prior to Encounter   Medication Sig Dispense Refill    atorvastatin (LIPITOR) 20 MG tablet Take 20 mg by mouth once daily.      insulin lispro 100 unit/mL injection Inject into the skin 3 (three) times daily before meals.      LANTUS 100 unit/mL injection Inject 15 Units into the skin 2 (two) times daily. (Patient taking differently: Inject 30 Units into the skin once daily. ) 9 mL 0     oxyCODONE-acetaminophen (PERCOCET) 5-325 mg per tablet Take 1 tablet by mouth every 4 (four) hours as needed for Pain. 15 tablet 0       PSHx:   Past Surgical History:   Procedure Laterality Date    CARDIAC SURGERY  1999    stent placed. ([facility])    ESOPHAGOGASTRODUODENOSCOPY N/A 3/5/2020    Procedure: EGD (ESOPHAGOGASTRODUODENOSCOPY);  Surgeon: Brinda Rene MD;  Location: [facility];  Service: Endoscopy;  Laterality: N/A;  W421 A; [phone]    SHOULDER SURGERY  2013    right shoulder, spur removal.       Allergies:   Review of patient's allergies indicates:  No Known Allergies      SHX:   - Tobacco:  reports that he has been smoking cigarettes. He started smoking about 39 years ago. He has a 20.00 pack-year smoking history. He has never used smokeless tobacco.  - EtOH:  reports that he does not drink alcohol.  - Illicit:   Social History     Substance and Sexual Activity   Drug Use Yes    Types: IV, Marijuana, Heroin    Comment: currently, used today, herion     - Occupation: Data Unavailable    FHx:   family history includes Asthma in his mother.    Current Meds:   Scheduled:    atorvastatin  20 mg Oral Daily    ceFEPime (MAXIPIME) IVPB  2 g Intravenous Q8H    enoxaparin  40 mg Subcutaneous Daily    insulin aspart U-100  7 Units Subcutaneous TIDWM    insulin detemir U-100  20 Units Subcutaneous Daily    oxyCODONE  5 mg Oral Once    vancomycin (VANCOCIN) IVPB  1,000 mg Intravenous Q12H       Continuous Infusions:       PRN:   acetaminophen, albuterol-ipratropium, dextromethorphan-guaifenesin  mg/5 ml, Dextrose 10% Bolus, Dextrose 10% Bolus, glucagon (human recombinant), glucose, glucose, insulin aspart U-100, loperamide, melatonin, ondansetron, ondansetron, oxyCODONE, sodium chloride 0.9%, Pharmacy to dose Vancomycin consult **AND** vancomycin - pharmacy to dose    VITAL SIGNS:   Temp:  [99.1 °F (37.3 °C)-101.2 °F (38.4 °C)]   Pulse:  []   Resp:  [19-29]   BP: (100-148)/(63-94)    SpO2:  [88 %-97 %]           Physical Exam   Gen: awake but drowsy, follows commands, ill appearing in NAD frequently reports that he is in diffuse pain with minimal movement or manipulation   HEENT: poor dentition  conjunctivae/corneas clear. PERRL.   CVS: tachycardic, regular rhythm   Chest: normal respiratory effort, diminshed breath sounds in right lung fields   Abdomen: soft, non-tender non-distended; bowel sounds normal   Ext: warm, well perfused and no cyanosis or edema, or clubbing   Skin: no rashes, warm   Neuro: Awake and alert, follows commands, moves all extemities            LABS:     Recent Labs   Lab 03/29/20  0702   WBC 9.18   RBC 3.18*   HGB 8.2*   HCT 26.9*   *   MCV 85   MCH 25.8*   MCHC 30.5*   *   K 4.9   CL 96   CO2 24   BUN 16   CREATININE 1.0   MG 2.0   ALT 10   AST 11   ALKPHOS 94   BILITOT 0.5   PROT 7.4   ALBUMIN 1.6*           CXR:    X-ray Chest Ap Portable    Result Date: 3/29/2020  Significant opacification of the right mid and right lower lung zone likely a combination of airspace disease and pleural fluid, not significantly improved Electronically signed by: Shona Morgan MD Date:    03/29/2020 Time:    08:30       Micro:   Microbiology Results (last 7 days)     Procedure Component Value Units Date/Time    Blood culture [[id number]] Collected:  03/29/20 1535    Order Status:  Sent Specimen:  Blood Updated:  03/29/20 1541    Narrative:       Collection has been rescheduled by LW1 at 03/29/2020 12:34 Reason:   Patient Refused wants to be cleaned first before labs are drawn tech   will come back in 30 mins spoke with nurse Sharonda  Collection has been rescheduled by LW1 at 03/29/2020 13:34 Reason:   Unable to collect pt wants tech to come back when he's done eating  Collection has been rescheduled by LW1 at 03/29/2020 12:34 Reason:   Patient Refused wants to be cleaned first before labs are drawn tech   will come back in 30 mins spoke with nurse  Sharonda  Collection has been rescheduled by LW1 at 03/29/2020 13:34 Reason:   Unable to collect pt wants tech to come back when he's done eating    Blood culture [[id number]] Collected:  03/29/20 1535    Order Status:  Sent Specimen:  Blood Updated:  03/29/20 1541    Narrative:       Collection has been rescheduled by LW1 at 03/29/2020 12:34 Reason:   Patient Refused wants to be cleaned first before labs are drawn tech   will come back in 30 mins spoke with nurse Sharonda  Collection has been rescheduled by LW1 at 03/29/2020 13:34 Reason:   Unable to collect pt wants tech to come back when he's done eating  Collection has been rescheduled by LW1 at 03/29/2020 12:34 Reason:   Patient Refused wants to be cleaned first before labs are drawn tech   will come back in 30 mins spoke with nurse Sharonda  Collection has been rescheduled by LW1 at 03/29/2020 13:34 Reason:   Unable to collect pt wants tech to come back when he's done eating    AFB Culture & Smear [[id number]]     Order Status:  No result Specimen:  Respiratory from Sputum, Expectorated     AFB Culture & Smear [[id number]]     Order Status:  No result Specimen:  Respiratory from Sputum, Expectorated     Blood culture x two cultures. Draw prior to antibiotics. [[id number]] Collected:  03/26/20 2157    Order Status:  Completed Specimen:  Blood from Peripheral, Hand, Left Updated:  03/28/20 2303     Blood Culture, Routine No Growth to date      No Growth to date      No Growth to date    Narrative:       Aerobic and anaerobic    Blood culture x two cultures. Draw prior to antibiotics. [[id number]] Collected:  03/26/20 2157    Order Status:  Completed Specimen:  Blood from Peripheral, Hand, Left Updated:  03/28/20 2303     Blood Culture, Routine No Growth to date      No Growth to date      No Growth to date    Narrative:       Aerobic and anaerobic    AFB Culture & Smear [[id number]]     Order Status:  No result Specimen:  Respiratory from Sputum, Expectorated      AFB Culture & Smear [[id number]]     Order Status:  No result Specimen:  Respiratory from Sputum, Expectorated     Respiratory Viral Panel by PCR [facility]; Nasal Swab [[id number]] Collected:  03/28/20 1252    Order Status:  Sent Specimen:  Respiratory Updated:  03/28/20 1317    AFB Culture & Smear [[id number]]     Order Status:  No result Specimen:  Sputum     Influenza A & B by Molecular [[id number]] Collected:  03/27/20 1148    Order Status:  Completed Specimen:  Nasopharyngeal Swab Updated:  03/27/20 1248     Influenza A, Molecular Negative     Influenza B, Molecular Negative     Flu A & B Source Nasal swab    Culture, Respiratory with Gram Stain [[id number]]     Order Status:  No result Specimen:  Sputum, Expectorated                ASSESSMENT/PLAN:       Right sided pleural effusion: Rapid progression over the past few days. Given clinical picture of recent MRSA pneumonia and bacteremia, highly concerned that has developed infecetious process especially since he continues to be febrile despite abx therapy. CT Chest most concerning for loculated pleural effusion/empyema with likely septic embolization and bibasilar pneumonia.   - f/u repeat TTE, would be concerned for valvular involvement given recent bacteremia and multifocal cavitaions may be septic emboli.  - low threshold for brain imaging  - patient consented for chest tube placement  - will place chest tube this evening.   - start intrapleural lytic therapy Dnase/TPA, clamp for 1 hour, then return chest tube to suction (-20mmHg)   - will send studies including cell count, gram stain/culture, AFB, glucose, protein, LDH, cytology  - continue abx per primary team     Sarai Hurley MD  Pulmonary / Critical Care Fellow, PGYIV  Pager: [phone]  03/29/2020  6:38 PM

## 2020-03-29 NOTE — PROGRESS NOTES
[facility]  Infectious Disease  Progress Note    Patient Name: Cali Mabry  MRN: [id number]  Admission Date: 3/26/2020  Length of Stay: 2 days  Attending Physician: Jered Diehl MD  Primary Care Provider: Primary Doctor No    Isolation Status: Airborne and Contact and Droplet, Airborne and Contact and Droplet  Assessment/Plan:      Septic embolism      54 year old with chronic pancreatitis, DM, HTN, HCV, IVDU transferred from [facility] where he presented with right chest and flank pain X 2 days, COVID rule out.  History of recent admission at [facility] from 2/22 to 3/8 with PNA and MRSA bacteremia, treated with 15 days  IV abx (vancomycin and ceftriaxone/zosyn then discharged.                  Afebrile on admit with WBC 20 now down to 9.18.    Procal 10.09, Ferritin 265, ESR 72, CRP 50.  D dimer elevated 3.42.  Last HIV 2/26/20 negative, new HIV pending.                   Flu negative, COVID negative.  Blood cultures remain with NGTD.  Respiratory culture pending, QG pending collection for Monday.  Patient denies any known exposure to TB.  Very remote foreign travel to [facility].  Does have history of incarceration, no homelessness.                  CTA chest on 3/26 was negative for PE but showed dense consolidative changes in RLL with interval cavitation containing air and fluid.  Occlusion seen in RLL bronchi ? Secondary to mucoid impaction or aspirated material. Trace right sided pneumothorax noted.   Noted to have multifocal opacities and cavitations, though improved from CT of 3/4 (last admission).  However, CXR today with significantly increased opacification of right lung - likely combination of airspace disease and pleural fluid.                Spiked fever to 101.2 today.  O2 sats 93-97 on 2 liters nasal cannula.  Denies significant SOB, but persistent right sided chest pain.  2D echo pending - verbal report indicates no vegetation.       Low suspicion for TB or fungal  infection at this point, but will rule out.   Suspect unresolved worsening MRSA PNA.  Recommend Pulmonary evaluation in setting of worsening CXR, worsening pleural effusion.     Plan/recommendations:  1.  Continue IV vancomycin.  Pharmacy to dose.  Maintain trough 15-20  2.  Continue cefepime 2 grams IV q 8 hours.   3.  Recommend Pulmonary evaluation as noted above.  4.  Sputum for AFB q 8 hours X 3 with one early morning collection - pending collection  5.  Sputum for MTB pcr - pending collection  6.  Respiratory culture pending collection.   7.  Maintain airborne isolation  8.  Will follow         Thank you.   Please call for any questions or concerns.  ARLENE Mcrae, ANP-C  [phone] pager, [phone]    Subjective:     Principal Problem:Acute respiratory failure with hypoxia    HPI: Cali Mabry is a 54 year old with chronic pancreatitis, DM, HTN, HCV, IVDU who was transferred from [facility] where he presented with right chest and flank pain X 2 days, COVID rule out.  Recent admission at [facility] from 2/22 to 3/8 with PNA and MRSA bacteremia, treated with IV abx, then discharged.   Complains of chest pain worse with movement and inspiration. Intermittent non-productive cough.  Denies fevers, chills, sore throat.  Last IVDU unclear.    Afebrile on admit with WBC 20.  Procal 10.09, Ferritin 265, ESR 72, CRP 50.    Flu negative, COVID negative.   Blood cultures NGTD.  Respiratory culture pending, QG pending.     CTA chest 3/26 - negative for PE; dense consolidative changes in RLL with interval cavitation containing air and fluid.  Occlusion seen in RLL bronchi ? Secondary to mucoid impaction or aspirated material.   Noted to have multifocal opacities and cavitations, small pneumothorax though improved from CT of 3/4.      CXR 3/29 appears to have increasing opacities right side.  Pulmonary consulted.  Currently on IV vancomycin and cefepime     Patient states he has been living with is mother  since last hospital discharge.  Mother has been healthy, no recent illness.  Denies fevers at home, no night sweats.  Has had poor appetite and believes lost weight because of this.  No significant cough or SOB.  No hemoptysis.  Primary complaint is right sided chest pain with movement and inspiration.      Risk factors for TB;  No known exposures  Remote foreign travel to [facility]  Does have history of incarceration  No homelessness     Interval History:  Fever to 101.2 today.  CXR worsening from admit.        Review of Systems   Constitutional: Positive for activity change and appetite change (decreased appetite). Negative for chills, fever and unexpected weight change.   HENT: Negative.    Eyes: Negative.    Respiratory: Positive for cough (dry cough) and chest tightness (chest pain with inspiration). Negative for shortness of breath and wheezing.    Cardiovascular: Negative for palpitations and leg swelling.   Gastrointestinal: Negative for abdominal pain, constipation, diarrhea, nausea and vomiting.   Genitourinary: Negative for difficulty urinating and dysuria.   Musculoskeletal: Negative for arthralgias, back pain and myalgias.   Skin: Negative for rash and wound.   Neurological: Negative for dizziness, weakness and headaches.   Hematological: Negative for adenopathy.   Psychiatric/Behavioral: Negative for agitation and behavioral problems.     Objective:     Vital Signs (Most Recent):  Temp: 99.8 °F (37.7 °C) (03/29/20 0752)  Pulse: 110 (03/29/20 0925)  Resp: (!) 25 (03/29/20 0925)  BP: (!) 145/85 (03/29/20 0925)  SpO2: (!) 93 % (03/29/20 0925) Vital Signs (24h Range):  Temp:  [98.9 °F (37.2 °C)-100.3 °F (37.9 °C)] 99.8 °F (37.7 °C)  Pulse:  [] 110  Resp:  [18-27] 25  SpO2:  [88 %-98 %] 93 %  BP: ()/(59-94) 145/85     Weight: 60.8 kg (134 lb 0.6 oz)  Body mass index is 20.38 kg/m².    Estimated Creatinine Clearance: 73.5 mL/min (based on SCr of 1 mg/dL).    Physical Exam   Constitutional: He is  oriented to person, place, and time. He appears well-developed. No distress.   Thin   HENT:   Head: Normocephalic and atraumatic.   Cardiovascular: Normal rate.   Pulmonary/Chest: Effort normal. No respiratory distress.   Neurological: He is alert and oriented to person, place, and time.   Skin: He is not diaphoretic.   Vitals reviewed.    Due to COVID-19 pandemic, ID team is limiting interaction with new patients unless absolutely necessary to limit patient exposure.   Patient observed through room window, history/ROS obtained by phone conversation with patient.     Significant Labs:   Blood Culture:   Recent Labs   Lab 03/01/20  0835 03/01/20  0842 03/03/20  1925 03/03/20  1931 03/26/20  2157   LABBLOO No growth after 5 days. No growth after 5 days. No growth after 5 days. No growth after 5 days. No Growth to date  No Growth to date  No Growth to date  No Growth to date  No Growth to date  No Growth to date     CBC:   Recent Labs   Lab 03/27/20  1148 03/28/20  1112 03/29/20  0702   WBC 11.36 13.58* 9.18   HGB 8.5* 8.6* 8.2*   HCT 28.2* 28.4* 26.9*   * 357* 367*     CMP:   Recent Labs   Lab 03/27/20  1148 03/28/20  1112 03/29/20  0702   * 129* 128*   K 5.0 4.6 4.9   CL 97 96 96   CO2 23 24 24   * 214* 303*   BUN 17 14 16   CREATININE 1.1 1.0 1.0   CALCIUM 8.1* 8.4* 8.5*   PROT 9.0* 7.8 7.4   ALBUMIN 2.2* 1.7* 1.6*   BILITOT 0.7 0.6 0.5   ALKPHOS 97 102 94   AST 18 10 11   ALT 10 9* 10   ANIONGAP 11 9 8   EGFRNONAA >60.0 >60.0 >60.0     Respiratory Culture:   Recent Labs   Lab 02/23/20  0200   GSRESP <10 epithelial cells per low power field.  Moderate WBC's  Moderate Gram positive cocci in clusters  Few Gram positive rods   RESPIRATORYC ESCHERICHIA COLI  Many  *  METHICILLIN RESISTANT STAPHYLOCOCCUS AUREUS  Many  *       Significant Imaging: I have reviewed all pertinent imaging results/findings within the past 24 hours.

## 2020-03-30 PROBLEM — J18.9 PARAPNEUMONIC EFFUSION: Status: ACTIVE | Noted: 2020-03-30

## 2020-03-30 PROBLEM — J91.8 PARAPNEUMONIC EFFUSION: Status: ACTIVE | Noted: 2020-03-30

## 2020-03-30 LAB
ALBUMIN SERPL BCP-MCNC: 1.6 G/DL (ref 3.5–5.2)
ALP SERPL-CCNC: 90 U/L (ref 55–135)
ALT SERPL W/O P-5'-P-CCNC: 10 U/L (ref 10–44)
ANION GAP SERPL CALC-SCNC: 8 MMOL/L (ref 8–16)
ANION GAP SERPL CALC-SCNC: 9 MMOL/L (ref 8–16)
AST SERPL-CCNC: 11 U/L (ref 10–40)
BACTERIA BLD CULT: NORMAL
BACTERIA BLD CULT: NORMAL
BASOPHILS # BLD AUTO: 0.02 K/UL (ref 0–0.2)
BASOPHILS NFR BLD: 0.2 % (ref 0–1.9)
BILIRUB SERPL-MCNC: 0.5 MG/DL (ref 0.1–1)
BUN SERPL-MCNC: 13 MG/DL (ref 6–20)
BUN SERPL-MCNC: 13 MG/DL (ref 6–20)
CALCIUM SERPL-MCNC: 8.2 MG/DL (ref 8.7–10.5)
CALCIUM SERPL-MCNC: 8.2 MG/DL (ref 8.7–10.5)
CHLORIDE SERPL-SCNC: 93 MMOL/L (ref 95–110)
CHLORIDE SERPL-SCNC: 94 MMOL/L (ref 95–110)
CO2 SERPL-SCNC: 23 MMOL/L (ref 23–29)
CO2 SERPL-SCNC: 25 MMOL/L (ref 23–29)
CREAT SERPL-MCNC: 0.8 MG/DL (ref 0.5–1.4)
CREAT SERPL-MCNC: 0.9 MG/DL (ref 0.5–1.4)
DIFFERENTIAL METHOD: ABNORMAL
EOSINOPHIL # BLD AUTO: 0 K/UL (ref 0–0.5)
EOSINOPHIL NFR BLD: 0.2 % (ref 0–8)
ERYTHROCYTE [DISTWIDTH] IN BLOOD BY AUTOMATED COUNT: 17.2 % (ref 11.5–14.5)
EST. GFR  (AFRICAN AMERICAN): >60 ML/MIN/1.73 M^2
EST. GFR  (AFRICAN AMERICAN): >60 ML/MIN/1.73 M^2
EST. GFR  (NON AFRICAN AMERICAN): >60 ML/MIN/1.73 M^2
EST. GFR  (NON AFRICAN AMERICAN): >60 ML/MIN/1.73 M^2
GLUCOSE SERPL-MCNC: 283 MG/DL (ref 70–110)
GLUCOSE SERPL-MCNC: 310 MG/DL (ref 70–110)
GRAM STN SPEC: NORMAL
GRAM STN SPEC: NORMAL
HCT VFR BLD AUTO: 26.4 % (ref 40–54)
HGB BLD-MCNC: 8.2 G/DL (ref 14–18)
IMM GRANULOCYTES # BLD AUTO: 0.11 K/UL (ref 0–0.04)
IMM GRANULOCYTES NFR BLD AUTO: 1 % (ref 0–0.5)
L PNEUMO AG UR QL IA: NOT DETECTED
LDH SERPL L TO P-CCNC: 144 U/L (ref 110–260)
LYMPHOCYTES # BLD AUTO: 1.5 K/UL (ref 1–4.8)
LYMPHOCYTES NFR BLD: 13.2 % (ref 18–48)
MAGNESIUM SERPL-MCNC: 1.8 MG/DL (ref 1.6–2.6)
MCH RBC QN AUTO: 25.9 PG (ref 27–31)
MCHC RBC AUTO-ENTMCNC: 31.1 G/DL (ref 32–36)
MCV RBC AUTO: 83 FL (ref 82–98)
MONOCYTES # BLD AUTO: 1.5 K/UL (ref 0.3–1)
MONOCYTES NFR BLD: 13.2 % (ref 4–15)
NEUTROPHILS # BLD AUTO: 8 K/UL (ref 1.8–7.7)
NEUTROPHILS NFR BLD: 72.2 % (ref 38–73)
NRBC BLD-RTO: 0 /100 WBC
OSMOLALITY SERPL: 287 MOSM/KG (ref 280–300)
OSMOLALITY UR: 504 MOSM/KG (ref 50–1200)
PHOSPHATE SERPL-MCNC: 2.5 MG/DL (ref 2.7–4.5)
PLATELET # BLD AUTO: 438 K/UL (ref 150–350)
PMV BLD AUTO: 9.7 FL (ref 9.2–12.9)
POCT GLUCOSE: 174 MG/DL (ref 70–110)
POCT GLUCOSE: 243 MG/DL (ref 70–110)
POCT GLUCOSE: 261 MG/DL (ref 70–110)
POCT GLUCOSE: 301 MG/DL (ref 70–110)
POCT GLUCOSE: 320 MG/DL (ref 70–110)
POTASSIUM SERPL-SCNC: 4.1 MMOL/L (ref 3.5–5.1)
POTASSIUM SERPL-SCNC: 4.2 MMOL/L (ref 3.5–5.1)
PROT SERPL-MCNC: 7.5 G/DL (ref 6–8.4)
RBC # BLD AUTO: 3.17 M/UL (ref 4.6–6.2)
SODIUM SERPL-SCNC: 125 MMOL/L (ref 136–145)
SODIUM SERPL-SCNC: 127 MMOL/L (ref 136–145)
SODIUM UR-SCNC: 22 MMOL/L (ref 20–250)
VANCOMYCIN TROUGH SERPL-MCNC: 15.6 UG/ML (ref 10–22)
WBC # BLD AUTO: 11.07 K/UL (ref 3.9–12.7)

## 2020-03-30 PROCEDURE — 63600175 PHARM REV CODE 636 W HCPCS: Performed by: PHYSICIAN ASSISTANT

## 2020-03-30 PROCEDURE — 86480 TB TEST CELL IMMUN MEASURE: CPT

## 2020-03-30 PROCEDURE — 83935 ASSAY OF URINE OSMOLALITY: CPT

## 2020-03-30 PROCEDURE — 63600175 PHARM REV CODE 636 W HCPCS: Performed by: STUDENT IN AN ORGANIZED HEALTH CARE EDUCATION/TRAINING PROGRAM

## 2020-03-30 PROCEDURE — 87206 SMEAR FLUORESCENT/ACID STAI: CPT | Mod: 91

## 2020-03-30 PROCEDURE — 84100 ASSAY OF PHOSPHORUS: CPT

## 2020-03-30 PROCEDURE — 25000242 PHARM REV CODE 250 ALT 637 W/ HCPCS

## 2020-03-30 PROCEDURE — 25000003 PHARM REV CODE 250: Performed by: STUDENT IN AN ORGANIZED HEALTH CARE EDUCATION/TRAINING PROGRAM

## 2020-03-30 PROCEDURE — 85025 COMPLETE CBC W/AUTO DIFF WBC: CPT

## 2020-03-30 PROCEDURE — 87116 MYCOBACTERIA CULTURE: CPT

## 2020-03-30 PROCEDURE — 36410 VNPNXR 3YR/> PHY/QHP DX/THER: CPT

## 2020-03-30 PROCEDURE — 76937 US GUIDE VASCULAR ACCESS: CPT

## 2020-03-30 PROCEDURE — 25000003 PHARM REV CODE 250: Performed by: PHYSICIAN ASSISTANT

## 2020-03-30 PROCEDURE — 99233 PR SUBSEQUENT HOSPITAL CARE,LEVL III: ICD-10-PCS | Mod: ,,, | Performed by: INTERNAL MEDICINE

## 2020-03-30 PROCEDURE — A4217 STERILE WATER/SALINE, 500 ML: HCPCS

## 2020-03-30 PROCEDURE — 84300 ASSAY OF URINE SODIUM: CPT

## 2020-03-30 PROCEDURE — C1751 CATH, INF, PER/CENT/MIDLINE: HCPCS

## 2020-03-30 PROCEDURE — 25000003 PHARM REV CODE 250: Performed by: INTERNAL MEDICINE

## 2020-03-30 PROCEDURE — 83735 ASSAY OF MAGNESIUM: CPT

## 2020-03-30 PROCEDURE — 99233 PR SUBSEQUENT HOSPITAL CARE,LEVL III: ICD-10-PCS | Mod: GC,,, | Performed by: STUDENT IN AN ORGANIZED HEALTH CARE EDUCATION/TRAINING PROGRAM

## 2020-03-30 PROCEDURE — 87015 SPECIMEN INFECT AGNT CONCNTJ: CPT | Mod: 59

## 2020-03-30 PROCEDURE — 80202 ASSAY OF VANCOMYCIN: CPT

## 2020-03-30 PROCEDURE — 80048 BASIC METABOLIC PNL TOTAL CA: CPT

## 2020-03-30 PROCEDURE — 36415 COLL VENOUS BLD VENIPUNCTURE: CPT

## 2020-03-30 PROCEDURE — 27000221 HC OXYGEN, UP TO 24 HOURS

## 2020-03-30 PROCEDURE — 80053 COMPREHEN METABOLIC PANEL: CPT

## 2020-03-30 PROCEDURE — 99233 SBSQ HOSP IP/OBS HIGH 50: CPT | Mod: ,,, | Performed by: INTERNAL MEDICINE

## 2020-03-30 PROCEDURE — 63600175 PHARM REV CODE 636 W HCPCS: Performed by: NURSE PRACTITIONER

## 2020-03-30 PROCEDURE — 83615 LACTATE (LD) (LDH) ENZYME: CPT

## 2020-03-30 PROCEDURE — 63600175 PHARM REV CODE 636 W HCPCS: Mod: JG

## 2020-03-30 PROCEDURE — 87116 MYCOBACTERIA CULTURE: CPT | Mod: 59

## 2020-03-30 PROCEDURE — 20600001 HC STEP DOWN PRIVATE ROOM

## 2020-03-30 PROCEDURE — 83930 ASSAY OF BLOOD OSMOLALITY: CPT

## 2020-03-30 PROCEDURE — 99233 SBSQ HOSP IP/OBS HIGH 50: CPT | Mod: GC,,, | Performed by: STUDENT IN AN ORGANIZED HEALTH CARE EDUCATION/TRAINING PROGRAM

## 2020-03-30 RX ORDER — SODIUM CHLORIDE 9 MG/ML
INJECTION, SOLUTION INTRAVENOUS CONTINUOUS
Status: DISCONTINUED | OUTPATIENT
Start: 2020-03-30 | End: 2020-03-30

## 2020-03-30 RX ORDER — MORPHINE SULFATE 2 MG/ML
2 INJECTION, SOLUTION INTRAMUSCULAR; INTRAVENOUS ONCE
Status: COMPLETED | OUTPATIENT
Start: 2020-03-30 | End: 2020-03-30

## 2020-03-30 RX ORDER — INSULIN ASPART 100 [IU]/ML
3 INJECTION, SOLUTION INTRAVENOUS; SUBCUTANEOUS
Status: CANCELLED | OUTPATIENT
Start: 2020-03-30

## 2020-03-30 RX ORDER — INSULIN ASPART 100 [IU]/ML
0-5 INJECTION, SOLUTION INTRAVENOUS; SUBCUTANEOUS
Status: DISCONTINUED | OUTPATIENT
Start: 2020-03-30 | End: 2020-04-22 | Stop reason: HOSPADM

## 2020-03-30 RX ADMIN — Medication 6 MG: at 02:03

## 2020-03-30 RX ADMIN — INSULIN ASPART 4 UNITS: 100 INJECTION, SOLUTION INTRAVENOUS; SUBCUTANEOUS at 05:03

## 2020-03-30 RX ADMIN — OXYCODONE HYDROCHLORIDE 10 MG: 10 TABLET ORAL at 04:03

## 2020-03-30 RX ADMIN — DORNASE ALFA 5 MG: 1 SOLUTION RESPIRATORY (INHALATION) at 03:03

## 2020-03-30 RX ADMIN — ATORVASTATIN CALCIUM 20 MG: 20 TABLET, FILM COATED ORAL at 08:03

## 2020-03-30 RX ADMIN — INSULIN ASPART 1 UNITS: 100 INJECTION, SOLUTION INTRAVENOUS; SUBCUTANEOUS at 09:03

## 2020-03-30 RX ADMIN — ONDANSETRON 8 MG: 8 TABLET, ORALLY DISINTEGRATING ORAL at 06:03

## 2020-03-30 RX ADMIN — ACETAMINOPHEN 650 MG: 325 TABLET ORAL at 09:03

## 2020-03-30 RX ADMIN — VANCOMYCIN HYDROCHLORIDE 1000 MG: 1 INJECTION, POWDER, LYOPHILIZED, FOR SOLUTION INTRAVENOUS at 12:03

## 2020-03-30 RX ADMIN — CEFEPIME 2 G: 2 INJECTION, POWDER, FOR SOLUTION INTRAVENOUS at 08:03

## 2020-03-30 RX ADMIN — GUAIFENESIN AND DEXTROMETHORPHAN 10 ML: 100; 10 SYRUP ORAL at 03:03

## 2020-03-30 RX ADMIN — OXYCODONE HYDROCHLORIDE 10 MG: 10 TABLET ORAL at 08:03

## 2020-03-30 RX ADMIN — SODIUM CHLORIDE: 0.9 INJECTION, SOLUTION INTRAVENOUS at 04:03

## 2020-03-30 RX ADMIN — ENOXAPARIN SODIUM 40 MG: 100 INJECTION SUBCUTANEOUS at 08:03

## 2020-03-30 RX ADMIN — INSULIN ASPART 8 UNITS: 100 INJECTION, SOLUTION INTRAVENOUS; SUBCUTANEOUS at 08:03

## 2020-03-30 RX ADMIN — ALTEPLASE 10 MG: 2.2 INJECTION, POWDER, LYOPHILIZED, FOR SOLUTION INTRAVENOUS at 03:03

## 2020-03-30 RX ADMIN — Medication 6 MG: at 09:03

## 2020-03-30 RX ADMIN — INSULIN ASPART 4 UNITS: 100 INJECTION, SOLUTION INTRAVENOUS; SUBCUTANEOUS at 12:03

## 2020-03-30 RX ADMIN — CEFEPIME 2 G: 2 INJECTION, POWDER, FOR SOLUTION INTRAVENOUS at 04:03

## 2020-03-30 RX ADMIN — OXYCODONE HYDROCHLORIDE 10 MG: 10 TABLET ORAL at 03:03

## 2020-03-30 RX ADMIN — OXYCODONE HYDROCHLORIDE 10 MG: 10 TABLET ORAL at 12:03

## 2020-03-30 RX ADMIN — MORPHINE SULFATE 2 MG: 2 INJECTION, SOLUTION INTRAMUSCULAR; INTRAVENOUS at 12:03

## 2020-03-30 NOTE — ASSESSMENT & PLAN NOTE
Multifocal Bacterial pneumonia  Cavitary lung lesions  Parapneumonic effusion/ EMPYEMA       -Quantiferon gold pending  -COVID testing 3/26/20 - NEGATIVE    -Dense consolidative changes are visualized within the posterior aspect of the right lower lobe.    -Internal cavitation with air and fluid seen, decreased in size from previous CT.    -Occlusion is seen of right lower lobe bronchi.  Airways are otherwise patent.    -Multifocal opacities and cavitations are seen within the lungs  -ID consulted and disucssed case with them will switch to cefepime and vanc and try to rule out TB  - Pulmonary consulted for worsening effusion, Chest tube placed and drained , xray reviewed  - fluid analysis consistent with exudate, will consult CTS for VATS.   - follow up cultures  -Chest tube to be managed by pulmonary appreciate help

## 2020-03-30 NOTE — SUBJECTIVE & OBJECTIVE
Interval History:      Patient complains of right sided chest pain, pleuritic. CT in place. Denies any other complainants. T max of 101.2    Review of Systems   Constitutional: Positive for fatigue and fever.   Respiratory: Positive for shortness of breath. Negative for cough (Denies).    Cardiovascular: Positive for chest pain (Pleuritic). Negative for leg swelling.   Gastrointestinal: Negative for abdominal pain, diarrhea, nausea and vomiting.   Genitourinary: Negative for dysuria.     Objective:     Vital Signs (Most Recent):  Temp: 97.8 °F (36.6 °C) (03/30/20 0830)  Pulse: 93 (03/30/20 0809)  Resp: (!) 24 (03/30/20 0706)  BP: 136/80 (03/30/20 0706)  SpO2: 98 % (03/30/20 0706) Vital Signs (24h Range):  Temp:  [97.8 °F (36.6 °C)-101.2 °F (38.4 °C)] 97.8 °F (36.6 °C)  Pulse:  [] 93  Resp:  [16-29] 24  SpO2:  [96 %-98 %] 98 %  BP: (120-139)/(68-80) 136/80     Weight: 60.8 kg (134 lb 0.6 oz)  Body mass index is 20.38 kg/m².    Estimated Creatinine Clearance: 91.8 mL/min (based on SCr of 0.8 mg/dL).    Physical Exam   Constitutional: He appears well-developed. No distress.   Thin   HENT:   Head: Normocephalic and atraumatic.   Eyes: Pupils are equal, round, and reactive to light. EOM are normal.   Neck: Normal range of motion. No tracheal deviation present. No thyromegaly present.   Cardiovascular: Normal heart sounds.   No murmur heard.  Tachycardia   Pulmonary/Chest: No respiratory distress. He has rales.   On 4L NC   Abdominal: Bowel sounds are normal. He exhibits no distension.   Musculoskeletal: He exhibits no edema.   Neurological: He is alert.   O x 1 ( person)  Per nurse O x 3 this morning    Skin: He is not diaphoretic.   Vitals reviewed.      Significant Labs:   Blood Culture:   Recent Labs   Lab 03/01/20  0835 03/01/20  0842 03/03/20 1925 03/03/20 1931 03/26/20 2157   [facility] No growth after 5 days. No growth after 5 days. No growth after 5 days. No growth after 5 days. No Growth to date  No  Growth to date  No Growth to date  No Growth to date  No Growth to date  No Growth to date  No Growth to date  No Growth to date     CBC:   Recent Labs   Lab 03/28/20  1112 03/29/20  0702 03/30/20  0641   WBC 13.58* 9.18 11.07   HGB 8.6* 8.2* 8.2*   HCT 28.4* 26.9* 26.4*   * 367* 438*     CMP:   Recent Labs   Lab 03/28/20  1112 03/29/20  0702 03/29/20  1917 03/30/20  0641   * 128*  --  125*   K 4.6 4.9  --  4.1   CL 96 96  --  93*   CO2 24 24  --  23   * 303*  --  283*   BUN 14 16  --  13   CREATININE 1.0 1.0  --  0.8   CALCIUM 8.4* 8.5*  --  8.2*   PROT 7.8 7.4 7.9 7.5   ALBUMIN 1.7* 1.6*  --  1.6*   BILITOT 0.6 0.5  --  0.5   ALKPHOS 102 94  --  90   AST 10 11  --  11   ALT 9* 10  --  10   ANIONGAP 9 8  --  9   EGFRNONAA >60.0 >60.0  --  >60.0     Respiratory Culture:   Recent Labs   Lab 02/23/20  0200 03/29/20  1843   GSRESP <10 epithelial cells per low power field.  Moderate WBC's  Moderate Gram positive cocci in clusters  Few Gram positive rods <10 epithelial cells per low power field.  Many WBC's  Rare Gram positive rods  Rare Gram negative rods   RESPIRATORYC ESCHERICHIA COLI  Many  *  METHICILLIN RESISTANT STAPHYLOCOCCUS AUREUS  Many  * Further report to follow       Significant Imaging: I have reviewed all pertinent imaging results/findings within the past 24 hours.

## 2020-03-30 NOTE — ASSESSMENT & PLAN NOTE
-oxyCODONE immediate release [id number] mg as patient his pain is no controlled PRN, moderate pain  - should get better with draining of fluid

## 2020-03-30 NOTE — ASSESSMENT & PLAN NOTE
Hypochloremia    Patient with very low PO intake suspect secondary to hypovolemia     Plan :  - start IVF 50 cc/hr for 10 hours and get repeat Na  - urine lytes ordered  - serum osm ordered   - caution on fast correction  - patient is asymptomatic

## 2020-03-30 NOTE — ASSESSMENT & PLAN NOTE
54 y.o. male with HCV, polysubstance use/ IVDU transferred from [facility] ED where he presented with right sided chest and flank pain X 2 days, COVID rule out.  History of recent admission at [facility] from 2/22 to 3/8 with PNA and MRSA bacteremia, treated with 15 days  IV abx (vancomycin and ceftriaxone/zosyn then discharged. On admission, Afebrile and  WBC 20. Last HIV 2/26/20 negative.     Flu and COVID negative.  Blood cultures remain with NGTD.  Respiratory culture pending, QG  And AFB pending.Patient denies any known exposure to TB.  Very remote foreign travel to [facility].  Does have history of incarceration, no homelessness. Low suspicion for TB or fungal infection at this point, but will rule out.      CTA chest on 3/26 showed dense consolidative changes in RLL with interval cavitation containing air and fluid.  Occlusion seen in RLL bronchi ? Secondary to mucoid impaction or aspirated material. Trace right sided pneumothorax noted. Noted to have multifocal opacities and cavitations, though improved from CT of 3/4 (last admission).  CXR showed with significantly increased opacification of right lung - likely combination of airspace disease and pleural fluid. TTE on 3/29 with no vegetations.  Suspect unresolved worsening MRSA PNA    Remains febrile T max 101.2, on 4L NC. Appreciate pulmonary assistance, CT placed on 3/29 with 1450 ml output, received tpa as well.           Plan/recommendations:  -   Continue IV vancomycin.  Maintain trough 15-20 and cefepime 2 grams IV q 8 hours.   -   Being ruled out for Tb, Sputum for AFB q 8 hours X 3 with one early morning collection and sputum for MTB pcr - pending collection  -  Will follow up pending cultures, Blood Cx with NGTD.

## 2020-03-30 NOTE — PHARMACY MED REC
"Admission Medication Reconciliation - Pharmacy Consult Note    The home medication history was taken by Keya Boss, Pharmacy Technician.  Based on information gathered and subsequent review by the clinical pharmacist, the items below may need attention.    You may go to "Admission" then "Reconcile Home Medications" tabs to review and/or act upon these items.    No issues noted with the medication reconciliation.    Please address this information as you see fit.  Feel free to contact us if you have any questions or require assistance.    Evie Durham, PharmD, BCPS  [id number]    [facility] Medications   Medication Sig    atorvastatin (LIPITOR) 20 MG tablet Take 20 mg by mouth once daily.    insulin lispro 100 unit/mL injection Inject into the skin 3 (three) times daily before meals.    LANTUS 100 unit/mL injection Inject 15 Units into the skin 2 (two) times daily. (Patient taking differently: Inject 30 Units into the skin once daily. )    oxyCODONE-acetaminophen (PERCOCET) 5-325 mg per tablet Take 1 tablet by mouth every 4 (four) hours as needed for Pain.         .    .          "

## 2020-03-30 NOTE — PLAN OF CARE
Pleural catheter instilled with 1st dose of Dnase/TPA @ 9:30 PM.     Nurse to return chest tube to suction at 10:30PM.     Sarai Hurley MD  Pulmonary / Critical Care Fellow, PGY4  #[phone]  03/29/2020  9:47 PM

## 2020-03-30 NOTE — CONSULTS
Single lumen 18g x 8 midline placed to left brachial vein.  Max dwell date 4/28/20, Lot# [id number].  Needle advance into the vessel under real time ultrasound guidance.  Image recorded and saved.

## 2020-03-30 NOTE — PROGRESS NOTES
Pharmacokinetic Assessment Follow Up: IV Vancomycin    Vancomycin serum concentration assessment(s):    · The trough level=15.6 was drawn correctly and can be used to guide therapy at this time  · The measurement is within the desired definitive target range of 15 to 20 mcg/mL.    Vancomycin Regimen Plan:    · Continue Vancomycin 1000 mg IV every 12 hours   · Next serum trough concentration will be measured at 1230 prior to the dose on 4/3; unless there is a change in renal function, will check sooner    Drug levels (last 3 results):  Recent Labs   Lab Result Units 03/28/20  2344 03/30/20  1211   Vancomycin-Trough ug/mL 12.9 15.6       Pharmacy will continue to follow and monitor vancomycin.    Please contact pharmacy at extension [phone] for questions regarding this assessment.    Thank you for the consult,   Evie Durham     Patient brief summary:  Cali Mabry is a 54 y.o. male initiated on antimicrobial therapy with IV Vancomycin for treatment of lower respiratory infection    Drug Allergies:   Review of patient's allergies indicates:  No Known Allergies    Actual Body Weight:   60.8 kg     Renal Function:   Estimated Creatinine Clearance: 91.8 mL/min (based on SCr of 0.8 mg/dL).,     CBC (last 72 hours):  Recent Labs   Lab Result Units 03/28/20  1112 03/29/20  0702 03/30/20  0641   WBC K/uL 13.58* 9.18 11.07   Hemoglobin g/dL 8.6* 8.2* 8.2*   Hematocrit % 28.4* 26.9* 26.4*   Platelets K/uL 357* 367* 438*   Gran% % 79.3* 67.3 72.2   Lymph% % 9.7* 17.8* 13.2*   Mono% % 10.1 13.3 13.2   Eosinophil% % 0.0 0.3 0.2   Basophil% % 0.1 0.2 0.2   Differential Method  Automated Automated Automated       Metabolic Panel (last 72 hours):  Recent Labs   Lab Result Units 03/28/20  1015 03/28/20  1112 03/29/20  0702 03/29/20  2138 03/30/20  0641   Sodium mmol/L  --  129* 128*  --  125*   Potassium mmol/L  --  4.6 4.9  --  4.1   Chloride mmol/L  --  96 96  --  93*   CO2 mmol/L  --  24 24  --  23   Glucose mg/dL  --   214* 303*  --  283*   Glucose, Fluid mg/dL  --   --   --  <5  --    BUN, Bld mg/dL  --  14 16  --  13   Creatinine mg/dL  --  1.0 1.0  --  0.8   Creatinine, Random Ur mg/dL 76.0  --   --   --   --    Albumin g/dL  --  1.7* 1.6*  --  1.6*   Total Bilirubin mg/dL  --  0.6 0.5  --  0.5   Alkaline Phosphatase U/L  --  102 94  --  90   AST U/L  --  10 11  --  11   ALT U/L  --  9* 10  --  10   Magnesium mg/dL  --  1.9 2.0  --  1.8   Phosphorus mg/dL  --  2.9 2.6*  --  2.5*       Vancomycin Administrations:  vancomycin given in the last 96 hours                   vancomycin in dextrose 5 % 1 gram/250 mL IVPB 1,000 mg (mg) 1,000 mg New Bag 03/30/20 1221     1,000 mg New Bag  0001     1,000 mg New Bag 03/29/20 1242     1,000 mg New Bag  0100     1,000 mg New Bag 03/28/20 1632                Microbiologic Results:  Microbiology Results (last 7 days)     Procedure Component Value Units Date/Time    AFB Culture & Smear [[id number]] Collected:  03/30/20 1213    Order Status:  Sent Specimen:  Respiratory from Sputum, Expectorated Updated:  03/30/20 1349    AFB Culture & Smear [[id number]] Collected:  03/29/20 1844    Order Status:  Completed Specimen:  Sputum, Expectorated Updated:  03/30/20 1255     AFB CULTURE STAIN No acid fast bacilli seen.    AFB culture (includes stain) [[id number]] Collected:  03/29/20 2137    Order Status:  Completed Specimen:  Body Fluid from Pleural Fluid Updated:  03/30/20 1255     AFB CULTURE STAIN No acid fast bacilli seen.    AFB Culture & Smear [[id number]] Collected:  03/30/20 1221    Order Status:  Sent Specimen:  Respiratory from Sputum, Expectorated Updated:  03/30/20 1222    Blood culture [[id number]] Collected:  03/29/20 1535    Order Status:  Completed Specimen:  Blood Updated:  03/30/20 1145     Blood Culture, Routine No Growth to date    Narrative:       Collection has been rescheduled by LW1 at 03/29/2020 12:34 Reason:   Patient Refused wants to be cleaned first before labs are drawn tech    will come back in 30 mins spoke with nurse Sharonda  Collection has been rescheduled by LW1 at 03/29/2020 13:34 Reason:   Unable to collect pt wants tech to come back when he's done eating  Collection has been rescheduled by LW1 at 03/29/2020 12:34 Reason:   Patient Refused wants to be cleaned first before labs are drawn tech   will come back in 30 mins spoke with nurse Sharonda  Collection has been rescheduled by LW1 at 03/29/2020 13:34 Reason:   Unable to collect pt wants tech to come back when he's done eating    Blood culture [[id number]] Collected:  03/29/20 1535    Order Status:  Completed Specimen:  Blood Updated:  03/30/20 1145     Blood Culture, Routine No Growth to date    Narrative:       Collection has been rescheduled by LW1 at 03/29/2020 12:34 Reason:   Patient Refused wants to be cleaned first before labs are drawn tech   will come back in 30 mins spoke with nurse Sharonda  Collection has been rescheduled by LW1 at 03/29/2020 13:34 Reason:   Unable to collect pt wants tech to come back when he's done eating  Collection has been rescheduled by LW1 at 03/29/2020 12:34 Reason:   Patient Refused wants to be cleaned first before labs are drawn tech   will come back in 30 mins spoke with nurse Sharonda  Collection has been rescheduled by LW1 at 03/29/2020 13:34 Reason:   Unable to collect pt wants tech to come back when he's done eating    AFB Culture & Smear [[id number]]     Order Status:  No result Specimen:  Respiratory from Sputum, Expectorated     Culture, Respiratory with Gram Stain [[id number]] Collected:  03/29/20 1843    Order Status:  Completed Specimen:  Sputum, Expectorated Updated:  03/30/20 0939     Respiratory Culture Further report to follow     Gram Stain (Respiratory) <10 epithelial cells per low power field.     Gram Stain (Respiratory) Many WBC's     Gram Stain (Respiratory) Rare Gram positive rods     Gram Stain (Respiratory) Rare Gram negative rods    Gram stain [[id number]]  Collected:  03/29/20 2137    Order Status:  Completed Specimen:  Body Fluid from Pleural Fluid Updated:  03/30/20 0038     Gram Stain Result Moderate WBC's      No organisms seen    Blood culture x two cultures. Draw prior to antibiotics. [[id number]] Collected:  03/26/20 2157    Order Status:  Completed Specimen:  Blood from Peripheral, Hand, Left Updated:  03/29/20 2303     Blood Culture, Routine No Growth to date      No Growth to date      No Growth to date      No Growth to date    Narrative:       Aerobic and anaerobic    Blood culture x two cultures. Draw prior to antibiotics. [[id number]] Collected:  03/26/20 2157    Order Status:  Completed Specimen:  Blood from Peripheral, Hand, Left Updated:  03/29/20 2303     Blood Culture, Routine No Growth to date      No Growth to date      No Growth to date      No Growth to date    Narrative:       Aerobic and anaerobic    Aerobic culture [[id number]] Collected:  03/29/20 2137    Order Status:  Sent Specimen:  Body Fluid from Pleural Fluid Updated:  03/29/20 2234    Fungus culture [[id number]] Collected:  03/29/20 2137    Order Status:  Sent Specimen:  Body Fluid from Pleural Fluid Updated:  03/29/20 2234    AFB Culture & Smear [[id number]]     Order Status:  No result Specimen:  Respiratory from Sputum, Expectorated     AFB Culture & Smear [[id number]]     Order Status:  No result Specimen:  Respiratory from Sputum, Expectorated     AFB Culture & Smear [[id number]]     Order Status:  No result Specimen:  Respiratory from Sputum, Expectorated     AFB Culture & Smear [[id number]]     Order Status:  No result Specimen:  Respiratory from Sputum, Expectorated     Respiratory Viral Panel by PCR [facility]; Nasal Swab [[id number]] Collected:  03/28/20 1252    Order Status:  Sent Specimen:  Respiratory Updated:  03/28/20 1317    Influenza A & B by Molecular [[id number]] Collected:  03/27/20 1148    Order Status:  Completed Specimen:  Nasopharyngeal Swab Updated:  03/27/20 1248      Influenza A, Molecular Negative     Influenza B, Molecular Negative     Flu A & B Source Nasal swab

## 2020-03-30 NOTE — ASSESSMENT & PLAN NOTE
-HbA1c 11  - required some D10 instructed nurse to encourage him to eat hold meal time insulin   -adjusting daily on 6U detemir BID and SSI   - encouraged to eat

## 2020-03-30 NOTE — PROGRESS NOTES
RD triggered for pt's A1C of 11.  Patient unavailable multiple times via phone for diabetic diet education.   RD to attach diabetic diet handout to pt's discharge paperwork.  RD following.    Thanks!  ERIKA Hess, LDN

## 2020-03-30 NOTE — PROGRESS NOTES
[facility]  Infectious Disease  Progress Note    Patient Name: Cali Mabry  MRN: [id number]  Admission Date: 3/26/2020  Length of Stay: 3 days  Attending Physician: Jered Diehl MD  Primary Care Provider: Primary Doctor No    Isolation Status: Airborne  Assessment/Plan:      Septic embolism  54 y.o. male with HCV, polysubstance use/ IVDU transferred from [facility] ED where he presented with right sided chest and flank pain X 2 days, COVID rule out.  History of recent admission at [facility] from 2/22 to 3/8 with PNA and MRSA bacteremia, treated with 15 days  IV abx (vancomycin and ceftriaxone/zosyn then discharged. On admission, Afebrile and  WBC 20. Last HIV 2/26/20 negative.     Flu and COVID negative.  Blood cultures remain with NGTD.  Respiratory culture pending, QG  And AFB pending.Patient denies any known exposure to TB.  Very remote foreign travel to [facility].  Does have history of incarceration, no homelessness. Low suspicion for TB or fungal infection at this point, but will rule out.      CTA chest on 3/26 showed dense consolidative changes in RLL with interval cavitation containing air and fluid.  Occlusion seen in RLL bronchi ? Secondary to mucoid impaction or aspirated material. Trace right sided pneumothorax noted. Noted to have multifocal opacities and cavitations, though improved from CT of 3/4 (last admission).  CXR showed with significantly increased opacification of right lung - likely combination of airspace disease and pleural fluid. TTE on 3/29 with no vegetations.  Suspect unresolved worsening MRSA PNA    Remains febrile T max 101.2, on 4L NC. Appreciate pulmonary assistance, CT placed on 3/29 with 1450 ml output, received tpa as well.           Plan/recommendations:  -   Continue IV vancomycin.  Maintain trough 15-20 and cefepime 2 grams IV q 8 hours.   -   Being ruled out for Tb, Sputum for AFB q 8 hours X 3 with one early morning collection and sputum for  MTB pcr - pending collection  -  Will follow up pending cultures, Blood Cx with NGTD.             Anticipated Disposition: TBD    Thank you for your consult. I will follow-up with patient. Please contact us if you have any additional questions.    Lenny Barrera MD  Infectious Disease  [facility]    Subjective:     Principal Problem:Acute respiratory failure with hypoxia    HPI: Cali Mabry is a 54 year old with chronic pancreatitis, DM, HTN, HCV, IVDU who was transferred from [facility] ED where he presented with right chest and flank pain X 2 days, COVID rule out.  Recent admission at [facility] from 2/22 to 3/8 with PNA and MRSA bacteremia, treated with IV abx, then discharged.   Complains of chest pain worse with movement and inspiration. Intermittent non-productive cough.  Denies fevers, chills, sore throat.  Last IVDU unclear.    Afebrile on admit with WBC 20.  Procal 10.09, Ferritin 265, ESR 72, CRP 50.    Flu negative, COVID negative.   Blood cultures NGTD.  Respiratory culture pending, QG pending.     CTA chest 3/26 - negative for PE; dense consolidative changes in RLL with interval cavitation containing air and fluid.  Occlusion seen in RLL bronchi ? Secondary to mucoid impaction or aspirated material.   Noted to have multifocal opacities and cavitations, small pneumothorax though improved from CT of 3/4.      CXR 3/29 appears to have increasing opacities right side.  Pulmonary consulted.  Currently on IV vancomycin and cefepime     Patient states he has been living with is mother since last hospital discharge.  Mother has been healthy, no recent illness.  Denies fevers at home, no night sweats.  Has had poor appetite and believes lost weight because of this.  No significant cough or SOB.  No hemoptysis.  Primary complaint is right sided chest pain with movement and inspiration.      Risk factors for TB;  No known exposures  Remote foreign travel to [facility]  Does have  history of incarceration  No homelessness     Interval History:      Patient complains of right sided chest pain, pleuritic. CT in place. Denies any other complainants. T max of 101.2    Review of Systems   Constitutional: Positive for fatigue and fever.   Respiratory: Positive for shortness of breath. Negative for cough (Denies).    Cardiovascular: Positive for chest pain (Pleuritic). Negative for leg swelling.   Gastrointestinal: Negative for abdominal pain, diarrhea, nausea and vomiting.   Genitourinary: Negative for dysuria.     Objective:     Vital Signs (Most Recent):  Temp: 97.8 °F (36.6 °C) (03/30/20 0830)  Pulse: 93 (03/30/20 0809)  Resp: (!) 24 (03/30/20 0706)  BP: 136/80 (03/30/20 0706)  SpO2: 98 % (03/30/20 0706) Vital Signs (24h Range):  Temp:  [97.8 °F (36.6 °C)-101.2 °F (38.4 °C)] 97.8 °F (36.6 °C)  Pulse:  [] 93  Resp:  [16-29] 24  SpO2:  [96 %-98 %] 98 %  BP: (120-139)/(68-80) 136/80     Weight: 60.8 kg (134 lb 0.6 oz)  Body mass index is 20.38 kg/m².    Estimated Creatinine Clearance: 91.8 mL/min (based on SCr of 0.8 mg/dL).    Physical Exam   Constitutional: He appears well-developed. No distress.   Thin   HENT:   Head: Normocephalic and atraumatic.   Eyes: Pupils are equal, round, and reactive to light. EOM are normal.   Neck: Normal range of motion. No tracheal deviation present. No thyromegaly present.   Cardiovascular: Normal heart sounds.   No murmur heard.  Tachycardia   Pulmonary/Chest: No respiratory distress. He has rales.   On 4L NC   Abdominal: Bowel sounds are normal. He exhibits no distension.   Musculoskeletal: He exhibits no edema.   Neurological: He is alert.   O x 1 ( person)  Per nurse O x 3 this morning    Skin: He is not diaphoretic.   Vitals reviewed.      Significant Labs:   Blood Culture:   Recent Labs   Lab 03/01/20  0835 03/01/20  0842 03/03/20 1925 03/03/20 1931 03/26/20  2157   [facility]. No growth after 5 days. No growth after 5 days. No  growth after 5 days. No Growth to date  No Growth to date  No Growth to date  No Growth to date  No Growth to date  No Growth to date  No Growth to date  No Growth to date     CBC:   Recent Labs   Lab 03/28/20  1112 03/29/20  0702 03/30/20  0641   WBC 13.58* 9.18 11.07   HGB 8.6* 8.2* 8.2*   HCT 28.4* 26.9* 26.4*   * 367* 438*     CMP:   Recent Labs   Lab 03/28/20  1112 03/29/20  0702 03/29/20  1917 03/30/20  0641   * 128*  --  125*   K 4.6 4.9  --  4.1   CL 96 96  --  93*   CO2 24 24  --  23   * 303*  --  283*   BUN 14 16  --  13   CREATININE 1.0 1.0  --  0.8   CALCIUM 8.4* 8.5*  --  8.2*   PROT 7.8 7.4 7.9 7.5   ALBUMIN 1.7* 1.6*  --  1.6*   BILITOT 0.6 0.5  --  0.5   ALKPHOS 102 94  --  90   AST 10 11  --  11   ALT 9* 10  --  10   ANIONGAP 9 8  --  9   EGFRNONAA >60.0 >60.0  --  >60.0     Respiratory Culture:   Recent Labs   Lab 02/23/20  0200 03/29/20  1843   GSRESP <10 epithelial cells per low power field.  Moderate WBC's  Moderate Gram positive cocci in clusters  Few Gram positive rods <10 epithelial cells per low power field.  Many WBC's  Rare Gram positive rods  Rare Gram negative rods   RESPIRATORYC ESCHERICHIA COLI  Many  *  METHICILLIN RESISTANT STAPHYLOCOCCUS AUREUS  Many  * Further report to follow       Significant Imaging: I have reviewed all pertinent imaging results/findings within the past 24 hours.

## 2020-03-30 NOTE — PLAN OF CARE
SW is following this Pt for DC planning needs. There are no identified needs at this time.    SW will continue to coordinate with patient, family, team and insurance to complete patient's discharge plan.    Jesika Leal LMSW   - Case Management

## 2020-03-30 NOTE — PROGRESS NOTES
Pulmonary Plan of Care      Chest tube inserted by fellow yesterday evening. 1500 cc drainage as of this AM. Improving CXR. Cultures pending but high suspect for MRSA empyema given CT findings and previous infection last month. Currently receiving Vanc and Cefepime. ID consulted today who will help with antibiotic management. Discussed case with CT surgery as well to ensure they are aware of patient, however he is unlikely to need their services given good intial response to intrapleural lytics.     We will continue to follow and provide BID lytic dosing for a total of 6 doses before consideration of repeat CT imaging, although this may not be needed depending on response clinically.     Recommend daily CXR for now,    Please call with any questions or concerns.     Chetan Agustin MD  [facility] Pulmonary and Critical Care Fellow  Pager: [phone]  Cell: [phone]

## 2020-03-30 NOTE — PROCEDURES
"Cali Mabry is a 54 y.o. male patient.    Temp: (!) 100.4 °F (38 °C) (03/29/20 2133)  Pulse: 107 (03/29/20 2012)  Resp: 16 (03/29/20 2012)  BP: 136/68 (03/29/20 2012)  SpO2: 96 % (03/29/20 2012)  Weight: 60.8 kg (134 lb) (03/29/20 1255)  Height: 5' 8" (172.7 cm) (03/29/20 1255)       Chest Tube Insertion  Date/Time: 3/29/2020 9:41 PM  Performed by: Sarai Hurley MD  Authorized by: Sarai Hurley MD   Post-operative diagnosis: pleural effusion  Pre-operative diagnosis: pleural effusion  Consent Done: Yes  Consent: Verbal consent obtained.  Risks and benefits: risks, benefits and alternatives were discussed  Consent given by: patient  Patient understanding: patient states understanding of the procedure being performed  Patient consent: the patient's understanding of the procedure matches consent given  Relevant documents: relevant documents present and verified  Site marked: the operative site was marked  Imaging studies: imaging studies available  Patient identity confirmed: MRN and provided demographic data  Time out: Immediately prior to procedure a "time out" was called to verify the correct patient, procedure, equipment, support staff and site/side marked as required.  Indications: pleural effusion  Patient sedated: no  Anesthesia: local infiltration    Anesthesia:  Local Anesthetic: lidocaine 1% without epinephrine  Preparation: skin prepped with ChloraPrep and sterile field established  Placement location: right lateral posterior.  Scalpel size: 11  Tube size (Thai): 14.  Ultrasound guidance: yes  Tube connected to: suction  Drainage characteristics: yellow cloudy.  Drainage amount: 450 ml  Suture material: 2-0 silk  Dressing: 4x4 sterile gauze  Patient tolerance: Patient tolerated the procedure well with no immediate complications  Complications: No  Implants: Yes (14 [place] pleural catheter)          Sarai Hurley MD  Pulmonary / Critical Care Fellow, PGY4  #[phone]  03/29/2020  9:44 PM    "

## 2020-03-30 NOTE — CARE UPDATE
Tpa/dnase given. Will leave closed for 1 hour. Nurse to open up drain after that.    Ángel Bowie MD  Pulmonary/criticalc care fellow

## 2020-03-30 NOTE — SUBJECTIVE & OBJECTIVE
Interval History:      Patient complains of right sided chest pain improved from yesterday. CT in place. Denies any other complainants.     Review of Systems   Constitutional: Positive for fatigue  Respiratory: Positive for shortness of breath and cough  Cardiovascular: Positive for chest pain (Pleuritic). Negative for leg swelling.   Gastrointestinal: Negative for abdominal pain, diarrhea, nausea and vomiting.   Genitourinary: Negative for dysuria.     Objective:     Vital Signs (Most Recent):  Temp: 98.2 °F (36.8 °C) (03/31/20 1554)  Pulse: 97 (03/31/20 1200)  Resp: (!) 24 (03/31/20 1200)  BP: (!) 148/94 (03/31/20 1200)  SpO2: 95 % (03/31/20 1200) Vital Signs (24h Range):  Temp:  [97.8 °F (36.6 °C)-99.3 °F (37.4 °C)] 98.2 °F (36.8 °C)  Pulse:  [] 97  Resp:  [14-34] 24  SpO2:  [94 %-99 %] 95 %  BP: ()/(61-98) 148/94     Weight: 59.5 kg (131 lb 2.8 oz)  Body mass index is 19.94 kg/m².    Estimated Creatinine Clearance: 71.9 mL/min (based on SCr of 1 mg/dL).    Physical Exam   Constitutional: He appears well-developed. No distress.   Thin   HENT:   Head: Normocephalic and atraumatic.   Eyes: Pupils are equal, round, and reactive to light. EOM are normal.   Neck: Normal range of motion. No tracheal deviation present. No thyromegaly present.   Cardiovascular: Normal heart sounds.   No murmur heard.  Tachycardia   Pulmonary/Chest: No respiratory distress. He has rales.   On 3L NC   Abdominal: Bowel sounds are normal. He exhibits no distension.   Musculoskeletal: He exhibits no edema.   Neurological: He is alert.   0 x 3   Skin: He is not diaphoretic.   Vitals reviewed.      Significant Labs:   Blood Culture:   Recent Labs   Lab 03/01/20  0842 03/03/20  1925 03/03/20  1931 03/26/20  2157 03/29/20  1535   LABBLOO No growth after 5 days. No growth after 5 days. No growth after 5 days. No Growth after 4 days.   No Growth after 4 days.  No Growth to date  No Growth to date  No Growth to date  No Growth to  date     CBC:   Recent Labs   Lab 03/30/20  0641 03/31/20  0530   WBC 11.07 8.63   HGB 8.2* 7.5*   HCT 26.4* 24.6*   * 447*     CMP:   Recent Labs   Lab 03/29/20  1917 03/30/20  0641  03/31/20  0004 03/31/20  0530 03/31/20  1205   NA  --  125*   < > 126* 126*  126* 126*   K  --  4.1   < > 4.1 4.2  4.2 4.5   CL  --  93*   < > 93* 94*  94* 92*   CO2  --  23   < > 27 27  27 27   GLU  --  283*   < > 263* 309*  309* 340*   BUN  --  13   < > 14 13  13 14   CREATININE  --  0.8   < > 0.9 0.9  0.9 1.0   CALCIUM  --  8.2*   < > 8.4* 8.6*  8.6* 8.8   PROT 7.9 7.5  --   --  7.5  --    ALBUMIN  --  1.6*  --   --  1.6*  --    BILITOT  --  0.5  --   --  0.4  --    ALKPHOS  --  90  --   --  89  --    AST  --  11  --   --  13  --    ALT  --  10  --   --  11  --    ANIONGAP  --  9   < > 6* 5*  5* 7*   EGFRNONAA  --  >60.0   < > >60.0 >60.0  >60.0 >60.0    < > = values in this interval not displayed.     Respiratory Culture:   Recent Labs   Lab 02/23/20  0200 03/29/20  1843   GSRESP <10 epithelial cells per low power field.  Moderate WBC's  Moderate Gram positive cocci in clusters  Few Gram positive rods <10 epithelial cells per low power field.  Many WBC's  Rare Gram positive rods  Rare Gram negative rods   RESPIRATORYC ESCHERICHIA COLI  Many  *  METHICILLIN RESISTANT STAPHYLOCOCCUS AUREUS  Many  * No Pseudomonas isolated.  STAPHYLOCOCCUS AUREUS  Few  Susceptibility pending  *  KLEBSIELLA PNEUMONIAE  Rare  Susceptibility pending  Normal respiratory ella also present  *       Significant Imaging: I have reviewed all pertinent imaging results/findings within the past 24 hours.

## 2020-03-30 NOTE — PROGRESS NOTES
"[facility] Hospital Medicine  Progress Note    Patient Name: Cali Mabry  MRN: [id number]  Patient Class: IP- Inpatient   Admission Date: 3/26/2020  Length of Stay: 3 days  Attending Physician: Jered Diehl MD  Primary Care Provider: Primary Doctor No    Hospital Medicine Team: [facility] 1 Kelsey Gomez MD    Subjective:     Principal Problem:Acute respiratory failure with hypoxia        HPI:  Cali Mabry is a 54 y.o. with DM II, hepatitis C, HTN, polysubstance abuse/IVDU, pancreatitis who presents, after transfer from [facility], with a 2 day history of chest pain and shortness of breath.  Chest pain is centralized and just described as "painful." Pain aggravated with movement and deep breathing. He reports an intermittent cough that is non-productive. He denies sick contacts, fever, chills, sore throat. Upon questioning, patient stated "I have the virus so just treat me for it." Educated patient that testing is still in process. Inquired about IV drug use however he was unable to recall when he last used. Patient swatted examiner's hand after chest palpation, yelling "that hurts!" Patient became aggravated, reported his pain was now worse and requested pain medication. Per nurse, patient given PRN dose an hour prior. Patient encouraged to continue discussing his plan of care however stated, "I don't want to chat."   ED: Afebrile with leukocytosis. CTA chest from 3/26 with dense consolidative changes within the right lower lobe with internal cavitation containing air and fluid; Occlusion is seen of the right lower lobe bronchi which may be secondary to mucoid impaction or aspirated material; Additional multifocal opacities and cavitations.  Vancomycin and Zosyn administered. On 2L NC. VSS. Admitted to hospital medicine for further evaluation and COVID-19 rule out.        Overview/Hospital Course:  Patient admitted for COVID rule out. Covid -Was stepped to non covid team " 3/28. Last fever was 3/28 at 3 am, sating well on 2 L. Patient mentation improved, pulmonary consulted for recurrent fevers in setting of pulmonary effusion, chest tube placed by pulmonary and fluids sent for analysis,repeat xray showed evacuation for fluid     No new subjective & objective note has been filed under this hospital service since the last note was generated.      Assessment/Plan:      * Acute respiratory failure with hypoxia  Multifocal Bacterial pneumonia  Cavitary lung lesions  Parapneumonic effusion/ EMPYEMA       -Quantiferon gold pending  -COVID testing 3/26/20 - NEGATIVE    -Dense consolidative changes are visualized within the posterior aspect of the right lower lobe.    -Internal cavitation with air and fluid seen, decreased in size from previous CT.    -Occlusion is seen of right lower lobe bronchi.  Airways are otherwise patent.    -Multifocal opacities and cavitations are seen within the lungs  -ID consulted and disucssed case with them will switch to cefepime and vanc and try to rule out TB  - Pulmonary consulted for worsening effusion, Chest tube placed and drained , xray reviewed  - fluid analysis consistent with exudate, will consult CTS for VATS.   - follow up cultures  -Chest tube to be managed by pulmonary appreciate help          Parapneumonic effusion        Chest pain      -oxyCODONE immediate release [id number] mg as patient his pain is no controlled PRN, moderate pain  - should get better with draining of fluid    Hyponatremia  Hypochloremia    Patient with very low PO intake suspect secondary to hypovolemia     Plan :  - start IVF 50 cc/hr for 10 hours and get repeat Na  - urine lytes ordered  - serum osm ordered   - caution on fast correction  - patient is asymptomatic       Intravenous drug abuse    -counseled on cessation      Hyperlipidemia  - continue statin       Essential hypertension  - holding any BP meds      Type 2 diabetes mellitus, uncontrolled  -HbA1c 11  - required  some D10 instructed nurse to encourage him to eat hold meal time insulin   -adjusting daily on 6U detemir BID and SSI   - encouraged to eat          VTE Risk Mitigation (From admission, onward)         Ordered     enoxaparin injection 40 mg  Daily      03/27/20 1029     IP VTE HIGH RISK PATIENT  Once      03/27/20 1511                      Kelsey Gomez MD  Department of Hospital Medicine   [facility]

## 2020-03-31 LAB
ALBUMIN SERPL BCP-MCNC: 1.6 G/DL (ref 3.5–5.2)
ALP SERPL-CCNC: 89 U/L (ref 55–135)
ALT SERPL W/O P-5'-P-CCNC: 11 U/L (ref 10–44)
ANION GAP SERPL CALC-SCNC: 5 MMOL/L (ref 8–16)
ANION GAP SERPL CALC-SCNC: 5 MMOL/L (ref 8–16)
ANION GAP SERPL CALC-SCNC: 6 MMOL/L (ref 8–16)
ANION GAP SERPL CALC-SCNC: 7 MMOL/L (ref 8–16)
AST SERPL-CCNC: 13 U/L (ref 10–40)
BASOPHILS # BLD AUTO: 0.02 K/UL (ref 0–0.2)
BASOPHILS NFR BLD: 0.2 % (ref 0–1.9)
BILIRUB SERPL-MCNC: 0.4 MG/DL (ref 0.1–1)
BUN SERPL-MCNC: 12 MG/DL (ref 6–20)
BUN SERPL-MCNC: 12 MG/DL (ref 6–20)
BUN SERPL-MCNC: 13 MG/DL (ref 6–20)
BUN SERPL-MCNC: 13 MG/DL (ref 6–20)
BUN SERPL-MCNC: 14 MG/DL (ref 6–20)
BUN SERPL-MCNC: 14 MG/DL (ref 6–20)
CALCIUM SERPL-MCNC: 8.2 MG/DL (ref 8.7–10.5)
CALCIUM SERPL-MCNC: 8.2 MG/DL (ref 8.7–10.5)
CALCIUM SERPL-MCNC: 8.4 MG/DL (ref 8.7–10.5)
CALCIUM SERPL-MCNC: 8.6 MG/DL (ref 8.7–10.5)
CALCIUM SERPL-MCNC: 8.6 MG/DL (ref 8.7–10.5)
CALCIUM SERPL-MCNC: 8.8 MG/DL (ref 8.7–10.5)
CHLORIDE SERPL-SCNC: 92 MMOL/L (ref 95–110)
CHLORIDE SERPL-SCNC: 93 MMOL/L (ref 95–110)
CHLORIDE SERPL-SCNC: 94 MMOL/L (ref 95–110)
CHLORIDE SERPL-SCNC: 94 MMOL/L (ref 95–110)
CHLORIDE SERPL-SCNC: 95 MMOL/L (ref 95–110)
CHLORIDE SERPL-SCNC: 95 MMOL/L (ref 95–110)
CHOLEST FLD-MCNC: 105 MG/DL
CO2 SERPL-SCNC: 25 MMOL/L (ref 23–29)
CO2 SERPL-SCNC: 25 MMOL/L (ref 23–29)
CO2 SERPL-SCNC: 27 MMOL/L (ref 23–29)
CREAT SERPL-MCNC: 0.8 MG/DL (ref 0.5–1.4)
CREAT SERPL-MCNC: 0.8 MG/DL (ref 0.5–1.4)
CREAT SERPL-MCNC: 0.9 MG/DL (ref 0.5–1.4)
CREAT SERPL-MCNC: 1 MG/DL (ref 0.5–1.4)
DIFFERENTIAL METHOD: ABNORMAL
ENTEROVIRUS: POSITIVE
EOSINOPHIL # BLD AUTO: 0.1 K/UL (ref 0–0.5)
EOSINOPHIL NFR BLD: 1 % (ref 0–8)
ERYTHROCYTE [DISTWIDTH] IN BLOOD BY AUTOMATED COUNT: 17.2 % (ref 11.5–14.5)
EST. GFR  (AFRICAN AMERICAN): >60 ML/MIN/1.73 M^2
EST. GFR  (NON AFRICAN AMERICAN): >60 ML/MIN/1.73 M^2
FINAL PATHOLOGIC DIAGNOSIS: NORMAL
GAMMA INTERFERON BACKGROUND BLD IA-ACNC: 0.02 IU/ML
GLUCOSE SERPL-MCNC: 213 MG/DL (ref 70–110)
GLUCOSE SERPL-MCNC: 213 MG/DL (ref 70–110)
GLUCOSE SERPL-MCNC: 263 MG/DL (ref 70–110)
GLUCOSE SERPL-MCNC: 309 MG/DL (ref 70–110)
GLUCOSE SERPL-MCNC: 309 MG/DL (ref 70–110)
GLUCOSE SERPL-MCNC: 340 MG/DL (ref 70–110)
HCT VFR BLD AUTO: 24.6 % (ref 40–54)
HGB BLD-MCNC: 7.5 G/DL (ref 14–18)
HIV 1+2 AB+HIV1 P24 AG SERPL QL IA: NEGATIVE
HUMAN BOCAVIRUS: NOT DETECTED
HUMAN CORONAVIRUS, COMMON COLD VIRUS: NOT DETECTED
IMM GRANULOCYTES # BLD AUTO: 0.12 K/UL (ref 0–0.04)
IMM GRANULOCYTES NFR BLD AUTO: 1.4 % (ref 0–0.5)
INFLUENZA A - H1N1-09: NOT DETECTED
LYMPHOCYTES # BLD AUTO: 1.6 K/UL (ref 1–4.8)
LYMPHOCYTES NFR BLD: 18.9 % (ref 18–48)
M TB IFN-G CD4+ BCKGRND COR BLD-ACNC: 0 IU/ML
MAGNESIUM SERPL-MCNC: 2 MG/DL (ref 1.6–2.6)
MCH RBC QN AUTO: 25.7 PG (ref 27–31)
MCHC RBC AUTO-ENTMCNC: 30.5 G/DL (ref 32–36)
MCV RBC AUTO: 84 FL (ref 82–98)
MITOGEN IGNF BCKGRD COR BLD-ACNC: 0.27 IU/ML
MONOCYTES # BLD AUTO: 1.1 K/UL (ref 0.3–1)
MONOCYTES NFR BLD: 12.5 % (ref 4–15)
NEUTROPHILS # BLD AUTO: 5.7 K/UL (ref 1.8–7.7)
NEUTROPHILS NFR BLD: 66 % (ref 38–73)
NRBC BLD-RTO: 0 /100 WBC
PARAINFLUENZA: NOT DETECTED
PHOSPHATE SERPL-MCNC: 2.7 MG/DL (ref 2.7–4.5)
PLATELET # BLD AUTO: 447 K/UL (ref 150–350)
PMV BLD AUTO: 9.7 FL (ref 9.2–12.9)
POCT GLUCOSE: 110 MG/DL (ref 70–110)
POCT GLUCOSE: 220 MG/DL (ref 70–110)
POCT GLUCOSE: 286 MG/DL (ref 70–110)
POCT GLUCOSE: 356 MG/DL (ref 70–110)
POTASSIUM SERPL-SCNC: 4.1 MMOL/L (ref 3.5–5.1)
POTASSIUM SERPL-SCNC: 4.2 MMOL/L (ref 3.5–5.1)
POTASSIUM SERPL-SCNC: 4.5 MMOL/L (ref 3.5–5.1)
PROT SERPL-MCNC: 7.5 G/DL (ref 6–8.4)
RBC # BLD AUTO: 2.92 M/UL (ref 4.6–6.2)
RVP - ADENOVIRUS: NOT DETECTED
RVP - HUMAN METAPNEUMOVIRUS (HMPV): NOT DETECTED
RVP - INFLUENZA A: NOT DETECTED
RVP - INFLUENZA B: NOT DETECTED
RVP - RESPIRATORY SYNCTIAL VIRUS (RSV) A: NOT DETECTED
RVP - RESPIRATORY VIRAL PANEL, SOURCE: ABNORMAL
RVP - RHINOVIRUS: POSITIVE
SODIUM SERPL-SCNC: 126 MMOL/L (ref 136–145)
SODIUM SERPL-SCNC: 127 MMOL/L (ref 136–145)
SODIUM SERPL-SCNC: 127 MMOL/L (ref 136–145)
SPECIMEN SOURCE: NORMAL
TB GOLD PLUS: ABNORMAL
TB2 - NIL: 0.01 IU/ML
WBC # BLD AUTO: 8.63 K/UL (ref 3.9–12.7)

## 2020-03-31 PROCEDURE — 25000003 PHARM REV CODE 250: Performed by: PHYSICIAN ASSISTANT

## 2020-03-31 PROCEDURE — 63600175 PHARM REV CODE 636 W HCPCS: Mod: JG

## 2020-03-31 PROCEDURE — 63600175 PHARM REV CODE 636 W HCPCS: Performed by: NURSE PRACTITIONER

## 2020-03-31 PROCEDURE — 25000242 PHARM REV CODE 250 ALT 637 W/ HCPCS

## 2020-03-31 PROCEDURE — 63600175 PHARM REV CODE 636 W HCPCS: Performed by: STUDENT IN AN ORGANIZED HEALTH CARE EDUCATION/TRAINING PROGRAM

## 2020-03-31 PROCEDURE — 99233 SBSQ HOSP IP/OBS HIGH 50: CPT | Mod: ,,, | Performed by: INTERNAL MEDICINE

## 2020-03-31 PROCEDURE — 99233 SBSQ HOSP IP/OBS HIGH 50: CPT | Mod: GC,,, | Performed by: STUDENT IN AN ORGANIZED HEALTH CARE EDUCATION/TRAINING PROGRAM

## 2020-03-31 PROCEDURE — 63600175 PHARM REV CODE 636 W HCPCS: Performed by: PHYSICIAN ASSISTANT

## 2020-03-31 PROCEDURE — C9399 UNCLASSIFIED DRUGS OR BIOLOG: HCPCS | Performed by: STUDENT IN AN ORGANIZED HEALTH CARE EDUCATION/TRAINING PROGRAM

## 2020-03-31 PROCEDURE — 87522 HEPATITIS C REVRS TRNSCRPJ: CPT

## 2020-03-31 PROCEDURE — 80053 COMPREHEN METABOLIC PANEL: CPT

## 2020-03-31 PROCEDURE — 20600001 HC STEP DOWN PRIVATE ROOM

## 2020-03-31 PROCEDURE — 25000242 PHARM REV CODE 250 ALT 637 W/ HCPCS: Performed by: INTERNAL MEDICINE

## 2020-03-31 PROCEDURE — A4217 STERILE WATER/SALINE, 500 ML: HCPCS | Performed by: INTERNAL MEDICINE

## 2020-03-31 PROCEDURE — 25000003 PHARM REV CODE 250: Performed by: STUDENT IN AN ORGANIZED HEALTH CARE EDUCATION/TRAINING PROGRAM

## 2020-03-31 PROCEDURE — 80048 BASIC METABOLIC PNL TOTAL CA: CPT | Mod: 91

## 2020-03-31 PROCEDURE — 99233 PR SUBSEQUENT HOSPITAL CARE,LEVL III: ICD-10-PCS | Mod: ,,, | Performed by: INTERNAL MEDICINE

## 2020-03-31 PROCEDURE — 99233 PR SUBSEQUENT HOSPITAL CARE,LEVL III: ICD-10-PCS | Mod: GC,,, | Performed by: STUDENT IN AN ORGANIZED HEALTH CARE EDUCATION/TRAINING PROGRAM

## 2020-03-31 PROCEDURE — 63600175 PHARM REV CODE 636 W HCPCS: Performed by: INTERNAL MEDICINE

## 2020-03-31 PROCEDURE — 85025 COMPLETE CBC W/AUTO DIFF WBC: CPT

## 2020-03-31 PROCEDURE — A4217 STERILE WATER/SALINE, 500 ML: HCPCS

## 2020-03-31 PROCEDURE — 36415 COLL VENOUS BLD VENIPUNCTURE: CPT

## 2020-03-31 PROCEDURE — 84100 ASSAY OF PHOSPHORUS: CPT

## 2020-03-31 PROCEDURE — 83735 ASSAY OF MAGNESIUM: CPT

## 2020-03-31 PROCEDURE — 86703 HIV-1/HIV-2 1 RESULT ANTBDY: CPT

## 2020-03-31 PROCEDURE — 25000003 PHARM REV CODE 250: Performed by: INTERNAL MEDICINE

## 2020-03-31 PROCEDURE — 80048 BASIC METABOLIC PNL TOTAL CA: CPT

## 2020-03-31 RX ORDER — HYDROMORPHONE HYDROCHLORIDE 1 MG/ML
0.5 INJECTION, SOLUTION INTRAMUSCULAR; INTRAVENOUS; SUBCUTANEOUS ONCE
Status: COMPLETED | OUTPATIENT
Start: 2020-03-31 | End: 2020-03-31

## 2020-03-31 RX ORDER — HYDROMORPHONE HYDROCHLORIDE 1 MG/ML
0.5 INJECTION, SOLUTION INTRAMUSCULAR; INTRAVENOUS; SUBCUTANEOUS ONCE
Status: DISCONTINUED | OUTPATIENT
Start: 2020-04-01 | End: 2020-04-01

## 2020-03-31 RX ORDER — INSULIN ASPART 100 [IU]/ML
3 INJECTION, SOLUTION INTRAVENOUS; SUBCUTANEOUS
Status: DISCONTINUED | OUTPATIENT
Start: 2020-03-31 | End: 2020-04-05

## 2020-03-31 RX ORDER — AMOXICILLIN 250 MG
1 CAPSULE ORAL DAILY PRN
Status: DISCONTINUED | OUTPATIENT
Start: 2020-03-31 | End: 2020-04-22 | Stop reason: HOSPADM

## 2020-03-31 RX ADMIN — INSULIN ASPART 1 UNITS: 100 INJECTION, SOLUTION INTRAVENOUS; SUBCUTANEOUS at 08:03

## 2020-03-31 RX ADMIN — OXYCODONE HYDROCHLORIDE 10 MG: 10 TABLET ORAL at 10:03

## 2020-03-31 RX ADMIN — OXYCODONE HYDROCHLORIDE 10 MG: 10 TABLET ORAL at 09:03

## 2020-03-31 RX ADMIN — ACETAMINOPHEN 650 MG: 325 TABLET ORAL at 08:03

## 2020-03-31 RX ADMIN — OXYCODONE HYDROCHLORIDE 10 MG: 10 TABLET ORAL at 12:03

## 2020-03-31 RX ADMIN — CEFEPIME 2 G: 2 INJECTION, POWDER, FOR SOLUTION INTRAVENOUS at 04:03

## 2020-03-31 RX ADMIN — ALTEPLASE 10 MG: 2.2 INJECTION, POWDER, LYOPHILIZED, FOR SOLUTION INTRAVENOUS at 11:03

## 2020-03-31 RX ADMIN — GUAIFENESIN AND DEXTROMETHORPHAN 10 ML: 100; 10 SYRUP ORAL at 06:03

## 2020-03-31 RX ADMIN — INSULIN ASPART 3 UNITS: 100 INJECTION, SOLUTION INTRAVENOUS; SUBCUTANEOUS at 12:03

## 2020-03-31 RX ADMIN — OXYCODONE HYDROCHLORIDE 10 MG: 10 TABLET ORAL at 02:03

## 2020-03-31 RX ADMIN — VANCOMYCIN HYDROCHLORIDE 1000 MG: 1 INJECTION, POWDER, LYOPHILIZED, FOR SOLUTION INTRAVENOUS at 12:03

## 2020-03-31 RX ADMIN — OXYCODONE HYDROCHLORIDE 10 MG: 10 TABLET ORAL at 05:03

## 2020-03-31 RX ADMIN — INSULIN DETEMIR 9 UNITS: 100 INJECTION, SOLUTION SUBCUTANEOUS at 08:03

## 2020-03-31 RX ADMIN — INSULIN ASPART 3 UNITS: 100 INJECTION, SOLUTION INTRAVENOUS; SUBCUTANEOUS at 09:03

## 2020-03-31 RX ADMIN — DORNASE ALFA 5 MG: 1 SOLUTION RESPIRATORY (INHALATION) at 11:03

## 2020-03-31 RX ADMIN — ATORVASTATIN CALCIUM 20 MG: 20 TABLET, FILM COATED ORAL at 09:03

## 2020-03-31 RX ADMIN — SODIUM CHLORIDE 500 ML: 0.9 INJECTION, SOLUTION INTRAVENOUS at 04:03

## 2020-03-31 RX ADMIN — INSULIN ASPART 5 UNITS: 100 INJECTION, SOLUTION INTRAVENOUS; SUBCUTANEOUS at 12:03

## 2020-03-31 RX ADMIN — HYDROMORPHONE HYDROCHLORIDE 0.5 MG: 1 INJECTION, SOLUTION INTRAMUSCULAR; INTRAVENOUS; SUBCUTANEOUS at 05:03

## 2020-03-31 RX ADMIN — CEFEPIME 2 G: 2 INJECTION, POWDER, FOR SOLUTION INTRAVENOUS at 12:03

## 2020-03-31 RX ADMIN — OXYCODONE HYDROCHLORIDE 10 MG: 10 TABLET ORAL at 06:03

## 2020-03-31 RX ADMIN — CEFEPIME 2 G: 2 INJECTION, POWDER, FOR SOLUTION INTRAVENOUS at 09:03

## 2020-03-31 RX ADMIN — ENOXAPARIN SODIUM 40 MG: 100 INJECTION SUBCUTANEOUS at 08:03

## 2020-03-31 NOTE — ASSESSMENT & PLAN NOTE
Improved     -oxyCODONE immediate release [id number] mg as patient his pain is no controlled PRN, moderate pain  - should get better with draining of fluid

## 2020-03-31 NOTE — PLAN OF CARE
Doses 4 and 5 of TPA DNAase today, with excellent clinical response thus far. Will continue therapy today and reassess tomorrow with final recs. Unlikely to need surgical management of empyema. Appreciate input of the cardiothoracic team as well. ID following for management of the antibiotics and work up for source control (?FREYA vs empiric treatment for endocarditis). Cultures from sputum with kleb and staph, RVP with rhinovirus and enterovirus, pleural fluid cultures with lactobacillus. Presently treated with vancomycin and cefepime.     Will continue to follow, please call with questions or concerns.    Chetan Agustin MD  [facility] Pulmonary and Critical Care Fellow  Pager: [phone]  Cell: [phone]

## 2020-03-31 NOTE — SUBJECTIVE & OBJECTIVE
Interval History: NAEON. Reports pain is improved with Oxy 10mg. No acute events overnight. Breathing is stable. Follows commands.     Review of Systems   Constitutional: Positive for fatigue and fever.   Respiratory: Positive for shortness of breath. Negative for cough (Denies).    Cardiovascular: Positive for chest pain (Pleuritic). Negative for leg swelling.   Gastrointestinal: Negative for abdominal pain, diarrhea, nausea and vomiting.   Genitourinary: Negative for dysuria.     Objective:     Vital Signs (Most Recent):  Temp: 98.2 °F (36.8 °C) (03/31/20 1554)  Pulse: 96 (03/31/20 1600)  Resp: (!) 28 (03/31/20 1600)  BP: 117/74 (03/31/20 1600)  SpO2: 96 % (03/31/20 1600) Vital Signs (24h Range):  Temp:  [97.8 °F (36.6 °C)-99.3 °F (37.4 °C)] 98.2 °F (36.8 °C)  Pulse:  [] 96  Resp:  [14-34] 28  SpO2:  [95 %-99 %] 96 %  BP: ()/(61-94) 117/74     Weight: 59.5 kg (131 lb 2.8 oz)  Body mass index is 19.94 kg/m².    Intake/Output Summary (Last 24 hours) at 3/31/2020 1655  Last data filed at 3/31/2020 1400  Gross per 24 hour   Intake 1901.67 ml   Output 1625 ml   Net 276.67 ml      Physical Exam   Constitutional: He appears well-developed. No distress.   Thin   HENT:   Head: Normocephalic and atraumatic.   Eyes: Pupils are equal, round, and reactive to light. EOM are normal.   Neck: Normal range of motion. No tracheal deviation present. No thyromegaly present.   Cardiovascular: Normal heart sounds.   No murmur heard.  Tachycardia   Pulmonary/Chest: No respiratory distress. He has rales.   On 4L NC   Abdominal: Bowel sounds are normal. He exhibits no distension.   Musculoskeletal: He exhibits no edema.   Neurological: He is alert.   O x 1 ( person)  Per nurse O x 3 this morning    Skin: He is not diaphoretic.   Vitals reviewed.      Significant Labs:   CBC:   Recent Labs   Lab 03/30/20  0641 03/31/20  0530   WBC 11.07 8.63   HGB 8.2* 7.5*   HCT 26.4* 24.6*   * 447*     CMP:   Recent Labs   Lab  03/29/20  1917 03/30/20  0641  03/31/20  0004 03/31/20  0530 03/31/20  1205   NA  --  125*   < > 126* 126*  126* 126*   K  --  4.1   < > 4.1 4.2  4.2 4.5   CL  --  93*   < > 93* 94*  94* 92*   CO2  --  23   < > 27 27  27 27   GLU  --  283*   < > 263* 309*  309* 340*   BUN  --  13   < > 14 13  13 14   CREATININE  --  0.8   < > 0.9 0.9  0.9 1.0   CALCIUM  --  8.2*   < > 8.4* 8.6*  8.6* 8.8   PROT 7.9 7.5  --   --  7.5  --    ALBUMIN  --  1.6*  --   --  1.6*  --    BILITOT  --  0.5  --   --  0.4  --    ALKPHOS  --  90  --   --  89  --    AST  --  11  --   --  13  --    ALT  --  10  --   --  11  --    ANIONGAP  --  9   < > 6* 5*  5* 7*   EGFRNONAA  --  >60.0   < > >60.0 >60.0  >60.0 >60.0    < > = values in this interval not displayed.       Significant Imaging: I have reviewed and interpreted all pertinent imaging results/findings within the past 24 hours.

## 2020-03-31 NOTE — PLAN OF CARE
Pt's orientated x4 most of the day, but occasionally is disoriented to time or place. When I spoke with his family, they reported that this is his baseline, and then they were told that he has the beginning of dementia. Vital signs stable and he is on 2L O2. Oxy given q4h for chest tube pain, although he reported that he still hurts all over. Vanc trough done before afternoon dose. BG ranged from 243-320; levemir and sliding scale novolog given. Pulmonology instilled TPA and pulmozyne into chest tube and then I opened tube back up at 1530.  Chest tube to suction at -20 and had 250mL serosanguinous drainage. Fall precautions in place. Pt has urinal at bedside, bed in low position, call bell in reach, non-skid socks on when pt not in bed.

## 2020-03-31 NOTE — PROGRESS NOTES
54-year-old male with history of DM, hepatitis C, HLD, IVDU, recent admission for MRSA / E coli pneumonia c/b MRSA bacteremia, presents with right chest pain. Discussed patient with consulting team and reviewed CT scan at presentation consistent with a large right effusion vs empyema and cavitary changes. Chest tube has been successfully placed with 400mL initial output, followed by 1L additional output after one treatment of tPA. XR this AM markedly improved. Agree with continued tPA treatment today. Will follow up with another XR in AM and discuss indication for potential surgical intervention in this patient. Full consultation note to follow upon interval XR in AM.

## 2020-03-31 NOTE — ASSESSMENT & PLAN NOTE
54 y.o. male with HCV, polysubstance use/ IVDU transferred from [facility] ED where he presented with right sided chest and flank pain X 2 days, COVID rule out.  History of recent admission at [facility] from 2/22 to 3/8 with PNA and MRSA bacteremia, treated with 15 days  IV abx (vancomycin and ceftriaxone/zosyn then discharged. On admission, Afebrile and  WBC 20. Last HIV 2/26/20 negative.     Flu and COVID negative.  Blood cultures remain with NGTD.  Respiratory culture pending, QG  And AFB pending.Patient denies any known exposure to TB.  Very remote foreign travel to [facility].  Does have history of incarceration, no homelessness. Low suspicion for TB or fungal infection at this point, but will rule out.      CTA chest on 3/26 showed dense consolidative changes in RLL with interval cavitation containing air and fluid.  Occlusion seen in RLL bronchi ? Secondary to mucoid impaction or aspirated material. Trace right sided pneumothorax noted. Noted to have multifocal opacities and cavitations, though improved from CT of 3/4 (last admission).  CXR showed with significantly increased opacification of right lung - likely combination of airspace disease and pleural fluid. TTE on 3/29 with no vegetations.  Suspect unresolved worsening MRSA PNA.  Appreciate pulmonary assistance, CT placed on 3/29.  Sputum CX - Staph Aureus and Klebsiella pneumonia. Also positive for rhinoviruses, enterovirus    Plan/recommendations:  -   Continue IV vancomycin.  Maintain trough 15-20 and cefepime 2 grams IV q 8 hours, anticipate 6 weeks of treatment  -   Being ruled out for Tb, Sputum for AFB q 8 hours X 3 with one early morning collection and sputum for MTB pcr  -  Will follow up pending cultures, Blood Cx with NGTD.

## 2020-03-31 NOTE — ASSESSMENT & PLAN NOTE
53 yo male with IVDU with Hep C, hx of MRSA (V sens), Pancreatitis, DM2, who presents as a transfer from [facility] for SOB and CP, CT + multifocal apical cavitations and opacities, assoc. w/ air +fluid, suspected due to MRSA pneumonia c/b parapneumonic empyema and possible septic embolization. covid -, s/d 3/28   Chest tube placed 3/30 qhs on TPA x 6 doses (2/6 doses on 3/30 and 3/29). CTS no intervention for now. TTE negative.     Plan:  - Continue TB rule out, pending AFB samples  - Continue broad spectrum therapy, on vanc and cefepime, final ID recommendations pending   - CTS consulted, unlikely to perform intervention, has had good output and decrease on O2 requirements  - TPA per pulmonology

## 2020-03-31 NOTE — PROGRESS NOTES
[facility]  Infectious Disease  Progress Note    Patient Name: Cali Mabry  MRN: [id number]  Admission Date: 3/26/2020  Length of Stay: 4 days  Attending Physician: Jered Diehl MD  Primary Care Provider: Primary Doctor No    Isolation Status: Airborne  Assessment/Plan:      Septic embolism  54 y.o. male with HCV, polysubstance use/ IVDU transferred from [facility] ED where he presented with right sided chest and flank pain X 2 days, COVID rule out.  History of recent admission at [facility] from 2/22 to 3/8 with PNA and MRSA bacteremia, treated with 15 days  IV abx (vancomycin and ceftriaxone/zosyn then discharged. On admission, Afebrile and  WBC 20. Last HIV 2/26/20 negative.     Flu and COVID negative.Patient denies any known exposure to TB.  Very remote foreign travel to [facility].  Does have history of incarceration, no homelessness. Low suspicion for TB or fungal infection at this point, but will rule out Tb.      CTA chest on 3/26 showed dense consolidative changes in RLL with interval cavitation containing air and fluid.  Occlusion seen in RLL bronchi ? Secondary to mucoid impaction or aspirated material. Trace right sided pneumothorax noted. Noted to have multifocal opacities and cavitations, though improved from CT of 3/4 (last admission).  CXR showed with significantly increased opacification of right lung - likely combination of airspace disease and pleural fluid. TTE on 3/29 with no vegetations.  Suspect unresolved worsening MRSA PNA.  Appreciate pulmonary assistance, CT placed on 3/29.  Sputum CX - Staph Aureus and GNR. Also positive for rhinoviruses, enterovirus    Plan/recommendations:  -   Continue IV vancomycin.  Maintain trough 15-20 and cefepime 2 grams IV q 8 hours, anticipate 6 weeks of treatment  -   Being ruled out for Tb, Sputum for AFB q 8 hours X 3 with one early morning collection and sputum for MTB pcr  -  Will follow up pending cultures, Blood Cx with  NGTD.           Anticipated Disposition: TBD    Thank you for your consult. I will follow-up with patient. Please contact us if you have any additional questions.    Lenny Barrera MD  Infectious Disease  [facility]    Subjective:     Principal Problem:Acute respiratory failure with hypoxia    HPI: Cali Mabry is a 54 year old with chronic pancreatitis, DM, HTN, HCV, IVDU who was transferred from [facility] ED where he presented with right chest and flank pain X 2 days, COVID rule out.  Recent admission at [facility] from 2/22 to 3/8 with PNA and MRSA bacteremia, treated with IV abx, then discharged.   Complains of chest pain worse with movement and inspiration. Intermittent non-productive cough.  Denies fevers, chills, sore throat.  Last IVDU unclear.    Afebrile on admit with WBC 20.  Procal 10.09, Ferritin 265, ESR 72, CRP 50.    Flu negative, COVID negative.   Blood cultures NGTD.  Respiratory culture pending, QG pending.     CTA chest 3/26 - negative for PE; dense consolidative changes in RLL with interval cavitation containing air and fluid.  Occlusion seen in RLL bronchi ? Secondary to mucoid impaction or aspirated material.   Noted to have multifocal opacities and cavitations, small pneumothorax though improved from CT of 3/4.      CXR 3/29 appears to have increasing opacities right side.  Pulmonary consulted.  Currently on IV vancomycin and cefepime     Patient states he has been living with is mother since last hospital discharge.  Mother has been healthy, no recent illness.  Denies fevers at home, no night sweats.  Has had poor appetite and believes lost weight because of this.  No significant cough or SOB.  No hemoptysis.  Primary complaint is right sided chest pain with movement and inspiration.      Risk factors for TB;  No known exposures  [facility]  Does have history of incarceration  No homelessness     Interval History:      Patient complains  of right sided chest pain improved from yesterday. CT in place. Denies any other complainants.     Review of Systems   Constitutional: Positive for fatigue  Respiratory: Positive for shortness of breath and cough  Cardiovascular: Positive for chest pain (Pleuritic). Negative for leg swelling.   Gastrointestinal: Negative for abdominal pain, diarrhea, nausea and vomiting.   Genitourinary: Negative for dysuria.     Objective:     Vital Signs (Most Recent):  Temp: 98.2 °F (36.8 °C) (03/31/20 1554)  Pulse: 97 (03/31/20 1200)  Resp: (!) 24 (03/31/20 1200)  BP: (!) 148/94 (03/31/20 1200)  SpO2: 95 % (03/31/20 1200) Vital Signs (24h Range):  Temp:  [97.8 °F (36.6 °C)-99.3 °F (37.4 °C)] 98.2 °F (36.8 °C)  Pulse:  [] 97  Resp:  [14-34] 24  SpO2:  [94 %-99 %] 95 %  BP: ()/(61-98) 148/94     Weight: 59.5 kg (131 lb 2.8 oz)  Body mass index is 19.94 kg/m².    Estimated Creatinine Clearance: 71.9 mL/min (based on SCr of 1 mg/dL).    Physical Exam   Constitutional: He appears well-developed. No distress.   Thin   HENT:   Head: Normocephalic and atraumatic.   Eyes: Pupils are equal, round, and reactive to light. EOM are normal.   Neck: Normal range of motion. No tracheal deviation present. No thyromegaly present.   Cardiovascular: Normal heart sounds.   No murmur heard.  Tachycardia   Pulmonary/Chest: No respiratory distress. He has rales.   On 3L NC   Abdominal: Bowel sounds are normal. He exhibits no distension.   Musculoskeletal: He exhibits no edema.   Neurological: He is alert.   0 x 3   Skin: He is not diaphoretic.   Vitals reviewed.      Significant Labs:   Blood Culture:   Recent Labs   Lab 03/01/20  0842 03/03/20  1925 03/03/20  1931 03/26/20  2157 03/29/20  1535   LABBLOO No growth after 5 days. No growth after 5 days. No growth after 5 days. No Growth after 4 days.   No Growth after 4 days.  No Growth to date  No Growth to date  No Growth to date  No Growth to date     CBC:   Recent Labs   Lab  03/30/20  0641 03/31/20  0530   WBC 11.07 8.63   HGB 8.2* 7.5*   HCT 26.4* 24.6*   * 447*     CMP:   Recent Labs   Lab 03/29/20  1917 03/30/20  0641  03/31/20  0004 03/31/20  0530 03/31/20  1205   NA  --  125*   < > 126* 126*  126* 126*   K  --  4.1   < > 4.1 4.2  4.2 4.5   CL  --  93*   < > 93* 94*  94* 92*   CO2  --  23   < > 27 27  27 27   GLU  --  283*   < > 263* 309*  309* 340*   BUN  --  13   < > 14 13  13 14   CREATININE  --  0.8   < > 0.9 0.9  0.9 1.0   CALCIUM  --  8.2*   < > 8.4* 8.6*  8.6* 8.8   PROT 7.9 7.5  --   --  7.5  --    ALBUMIN  --  1.6*  --   --  1.6*  --    BILITOT  --  0.5  --   --  0.4  --    ALKPHOS  --  90  --   --  89  --    AST  --  11  --   --  13  --    ALT  --  10  --   --  11  --    ANIONGAP  --  9   < > 6* 5*  5* 7*   EGFRNONAA  --  >60.0   < > >60.0 >60.0  >60.0 >60.0    < > = values in this interval not displayed.     Respiratory Culture:   Recent Labs   Lab 02/23/20  0200 03/29/20  1843   GSRESP <10 epithelial cells per low power field.  Moderate WBC's  Moderate Gram positive cocci in clusters  Few Gram positive rods <10 epithelial cells per low power field.  Many WBC's  Rare Gram positive rods  Rare Gram negative rods   RESPIRATORYC ESCHERICHIA COLI  Many  *  METHICILLIN RESISTANT STAPHYLOCOCCUS AUREUS  Many  * No Pseudomonas isolated.  STAPHYLOCOCCUS AUREUS  Few  Susceptibility pending  *  KLEBSIELLA PNEUMONIAE  Rare  Susceptibility pending  Normal respiratory ella also present  *       Significant Imaging: I have reviewed all pertinent imaging results/findings within the past 24 hours.

## 2020-03-31 NOTE — SUBJECTIVE & OBJECTIVE
Review of Systems   Constitutional: Negative for fever.   Respiratory: Negative for cough (Denies).    Cardiovascular: Negative for chest pain (Pleuritic) and leg swelling.   Gastrointestinal: Negative for abdominal pain, diarrhea, nausea and vomiting.   Genitourinary: Negative for dysuria.   Musculoskeletal: Negative for gait problem and joint swelling.   Skin: Negative for wound.     Objective:     Vital Signs (Most Recent):  Temp: 97.8 °F (36.6 °C) (03/31/20 0915)  Pulse: 85 (03/31/20 0921)  Resp: (!) 25 (03/31/20 0921)  BP: 103/65 (03/31/20 0921)  SpO2: 99 % (03/31/20 0921) Vital Signs (24h Range):  Temp:  [97.8 °F (36.6 °C)-99.3 °F (37.4 °C)] 97.8 °F (36.6 °C)  Pulse:  [] 85  Resp:  [14-34] 25  SpO2:  [94 %-99 %] 99 %  BP: ()/(61-98) 103/65     Weight: 59.5 kg (131 lb 2.8 oz)  Body mass index is 19.94 kg/m².    Estimated Creatinine Clearance: 79.9 mL/min (based on SCr of 0.9 mg/dL).    Physical Exam   Constitutional: He appears well-developed. No distress.   Thin   HENT:   Head: Normocephalic and atraumatic.   Eyes: Conjunctivae and EOM are normal.   Neck: Normal range of motion. No tracheal deviation present. No thyromegaly present.   Cardiovascular: Normal rate and regular rhythm.   No murmur heard.  Pulmonary/Chest: Effort normal and breath sounds normal. No respiratory distress.   On 2L NC   Abdominal: Bowel sounds are normal. He exhibits no distension. There is no tenderness.   Musculoskeletal: He exhibits no edema.   Neurological: He is alert.   Waxing and waning alertness and orientation per reports and review   Skin: Skin is warm. Capillary refill takes less than 2 seconds. He is not diaphoretic.   Vitals reviewed.      Significant Labs:   Blood Culture:   Recent Labs   Lab 03/01/20  0842 03/03/20  1925 03/03/20  1931 03/26/20  2157 03/29/20  1535   LABBLOO No growth after 5 days. No growth after 5 days. No growth after 5 days. No Growth after 4 days.   No Growth after 4 days.  No Growth  to date  No Growth to date  No Growth to date  No Growth to date     CBC:   Recent Labs   Lab 03/30/20  0641 03/31/20  0530   WBC 11.07 8.63   HGB 8.2* 7.5*   HCT 26.4* 24.6*   * 447*     CMP:   Recent Labs   Lab 03/29/20  1917  03/30/20  0641 03/30/20  1839 03/31/20  0004 03/31/20  0530   NA  --    < > 125* 127* 126* 126*  126*   K  --    < > 4.1 4.2 4.1 4.2  4.2   CL  --    < > 93* 94* 93* 94*  94*   CO2  --    < > 23 25 27 27  27   GLU  --    < > 283* 310* 263* 309*  309*   BUN  --    < > 13 13 14 13  13   CREATININE  --    < > 0.8 0.9 0.9 0.9  0.9   CALCIUM  --    < > 8.2* 8.2* 8.4* 8.6*  8.6*   PROT 7.9  --  7.5  --   --  7.5   ALBUMIN  --   --  1.6*  --   --  1.6*   BILITOT  --   --  0.5  --   --  0.4   ALKPHOS  --   --  90  --   --  89   AST  --   --  11  --   --  13   ALT  --   --  10  --   --  11   ANIONGAP  --    < > 9 8 6* 5*  5*   EGFRNONAA  --    < > >60.0 >60.0 >60.0 >60.0  >60.0    < > = values in this interval not displayed.     Respiratory Culture:   Recent Labs   Lab 02/23/20  0200 03/29/20  1843   GSRESP <10 epithelial cells per low power field.  Moderate WBC's  Moderate Gram positive cocci in clusters  Few Gram positive rods <10 epithelial cells per low power field.  Many WBC's  Rare Gram positive rods  Rare Gram negative rods   RESPIRATORYC ESCHERICHIA COLI  Many  *  METHICILLIN RESISTANT STAPHYLOCOCCUS AUREUS  Many  * Further report to follow       Significant Imaging: I have reviewed all pertinent imaging results/findings within the past 24 hours.     CT Chest with Contrast 3/29/2020  -right basilar consolidation and infiltrate consistent with infection/pneumonia  -large adjacent complex pleural fluid collection with associated air foci; could represent a large empyema with loculated components  -adjacent peripheral pulmonary abscess is not excluded  -bilateral small complex collection or masses with mild cavitation also; could represent multifocal infection,  neoplasm not excluded    Transthoracic Echo 3/29/2020  -technically difficult study  -EF 60%  -aortic valve appears structurally normal, normal leaflet mobility  -LVOT diameter 1.96 cm  -mitral valve appears structurally normal, normal leaflet mobility  -tricuspid valve appears structurally normal, normal leaflet mobility  -pulmonic valve not well visualized  -no pericardial effusion

## 2020-03-31 NOTE — PLAN OF CARE
Patient is AAO today, complains of 8/10 pain to chest tube site. Flushed with cathflo and pulmazyme. Sputum sent today. Afebrile, vanc and cefepime given. Patient is a one person assist. Positive for rhino. BG elevated today, long and short acting insulin given. NS bolus given.

## 2020-03-31 NOTE — PROGRESS NOTES
"[facility] Hospital Medicine  Progress Note    Patient Name: Cali Mabry  MRN: [id number]  Patient Class: IP- Inpatient   Admission Date: 3/26/2020  Length of Stay: 4 days  Attending Physician: Jered Diehl MD  Primary Care Provider: Primary Doctor No    Hospital Medicine Team: [facility]OSP MED 1 Matthew Blackwell MD    Subjective:     Principal Problem:Acute respiratory failure with hypoxia        HPI:  Cali Mabry is a 54 y.o. with DM II, hepatitis C, HTN, polysubstance abuse/IVDU, pancreatitis who presents, after transfer from [facility], with a 2 day history of chest pain and shortness of breath.  Chest pain is centralized and just described as "painful." Pain aggravated with movement and deep breathing. He reports an intermittent cough that is non-productive. He denies sick contacts, fever, chills, sore throat. Upon questioning, patient stated "I have the virus so just treat me for it." Educated patient that testing is still in process. Inquired about IV drug use however he was unable to recall when he last used. Patient swatted examiner's hand after chest palpation, yelling "that hurts!" Patient became aggravated, reported his pain was now worse and requested pain medication. Per nurse, patient given PRN dose an hour prior. Patient encouraged to continue discussing his plan of care however stated, "I don't want to chat."   ED: Afebrile with leukocytosis. CTA chest from 3/26 with dense consolidative changes within the right lower lobe with internal cavitation containing air and fluid; Occlusion is seen of the right lower lobe bronchi which may be secondary to mucoid impaction or aspirated material; Additional multifocal opacities and cavitations.  Vancomycin and Zosyn administered. On 2L NC. VSS. Admitted to hospital medicine for further evaluation and COVID-19 rule out.        Overview/Hospital Course:  53 yo male with IVDU with Hep C, hx of MRSA (V sens), Pancreatitis, " DM2, who presents as a transfer from [facility] for SOB and CP, CT + multifocal apical cavitations and opacities, assoc. w/ air +fluid, suspected due to MRSA pneumonia c/b parapneumonic empyema and possible septic embolization. covid -, s/d 3/28   Chest tube placed 3/30 qhs on TPA x 6 doses (2/6 doses on 3/30 and 3/29). CTS no intervention for now. TTE negative.     Interval History: NAEON. Reports pain is improved with Oxy 10mg. No acute events overnight. Breathing is stable. Follows commands.     Review of Systems   Constitutional: Positive for fatigue and fever.   Respiratory: Positive for shortness of breath. Negative for cough (Denies).    Cardiovascular: Positive for chest pain (Pleuritic). Negative for leg swelling.   Gastrointestinal: Negative for abdominal pain, diarrhea, nausea and vomiting.   Genitourinary: Negative for dysuria.     Objective:     Vital Signs (Most Recent):  Temp: 98.2 °F (36.8 °C) (03/31/20 1554)  Pulse: 96 (03/31/20 1600)  Resp: (!) 28 (03/31/20 1600)  BP: 117/74 (03/31/20 1600)  SpO2: 96 % (03/31/20 1600) Vital Signs (24h Range):  Temp:  [97.8 °F (36.6 °C)-99.3 °F (37.4 °C)] 98.2 °F (36.8 °C)  Pulse:  [] 96  Resp:  [14-34] 28  SpO2:  [95 %-99 %] 96 %  BP: ()/(61-94) 117/74     Weight: 59.5 kg (131 lb 2.8 oz)  Body mass index is 19.94 kg/m².    Intake/Output Summary (Last 24 hours) at 3/31/2020 1655  Last data filed at 3/31/2020 1400  Gross per 24 hour   Intake 1901.67 ml   Output 1625 ml   Net 276.67 ml      Physical Exam   Constitutional: He appears well-developed. No distress.   Thin   HENT:   Head: Normocephalic and atraumatic.   Eyes: Pupils are equal, round, and reactive to light. EOM are normal.   Neck: Normal range of motion. No tracheal deviation present. No thyromegaly present.   Cardiovascular: Normal heart sounds.   No murmur heard.  Tachycardia   Pulmonary/Chest: No respiratory distress. He has rales.   On 4L NC   Abdominal: Bowel sounds are normal. He exhibits  no distension.   Musculoskeletal: He exhibits no edema.   Neurological: He is alert.   O x 1 ( person)  Per nurse O x 3 this morning    Skin: He is not diaphoretic.   Vitals reviewed.      Significant Labs:   CBC:   Recent Labs   Lab 03/30/20  0641 03/31/20  0530   WBC 11.07 8.63   HGB 8.2* 7.5*   HCT 26.4* 24.6*   * 447*     CMP:   Recent Labs   Lab 03/29/20  1917 03/30/20  0641  03/31/20  0004 03/31/20  0530 03/31/20  1205   NA  --  125*   < > 126* 126*  126* 126*   K  --  4.1   < > 4.1 4.2  4.2 4.5   CL  --  93*   < > 93* 94*  94* 92*   CO2  --  23   < > 27 27  27 27   GLU  --  283*   < > 263* 309*  309* 340*   BUN  --  13   < > 14 13  13 14   CREATININE  --  0.8   < > 0.9 0.9  0.9 1.0   CALCIUM  --  8.2*   < > 8.4* 8.6*  8.6* 8.8   PROT 7.9 7.5  --   --  7.5  --    ALBUMIN  --  1.6*  --   --  1.6*  --    BILITOT  --  0.5  --   --  0.4  --    ALKPHOS  --  90  --   --  89  --    AST  --  11  --   --  13  --    ALT  --  10  --   --  11  --    ANIONGAP  --  9   < > 6* 5*  5* 7*   EGFRNONAA  --  >60.0   < > >60.0 >60.0  >60.0 >60.0    < > = values in this interval not displayed.       Significant Imaging: I have reviewed and interpreted all pertinent imaging results/findings within the past 24 hours.      Assessment/Plan:      * Acute respiratory failure with hypoxia  Multifocal Bacterial pneumonia  Cavitary lung lesions  Parapneumonic effusion/ EMPYEMA       -Quantiferon gold indeterminate  -COVID testing 3/26/20 - NEGATIVE  -Dense consolidative changes are visualized within the posterior aspect of the right lower lobe.    -Internal cavitation with air and fluid seen, decreased in size from previous CT.    -Occlusion is seen of right lower lobe bronchi.  Airways are otherwise patent.    -Multifocal opacities and cavitations are seen within the lungs      55 yo male with IVDU with Hep C, hx of MRSA (V sens), Pancreatitis, DM2, who presents as a transfer from [facility] for SOB and CP, CT + multifocal  apical cavitations and opacities, assoc. w/ air +fluid, suspected due to MRSA pneumonia c/b parapneumonic empyema and possible septic embolization. covid -, s/d 3/28   Chest tube placed 3/30 qhs on TPA x 6 doses (2/6 doses on 3/30 and 3/29). CTS no intervention for now. TTE negative.     Plan:  - Continue TB rule out, pending AFB samples  - Continue broad spectrum therapy, on vanc and cefepime, final ID recommendations pending   - CTS consulted, unlikely to perform intervention, has had good output and decrease on O2 requirements  - TPA per pulmonology           Septic embolism  TTE negative  Neuro checks q4h.       Parapneumonic effusion  See above       Hyponatremia  Na studies consistent with pseudohyponatremia, possible also additional component of decreased effective art volume    - Rebolus fluids today  - Treat hyperglycemia, with insulin     Chest pain  Improved     -oxyCODONE immediate release [id number] mg as patient his pain is no controlled PRN, moderate pain  - should get better with draining of fluid    Intravenous drug abuse    -counseled on cessation      Hyperlipidemia  - continue statin       Essential hypertension  Stable, not on meds       Type 2 diabetes mellitus, uncontrolled  -HbA1c 11  - Insulin adjusted 9 long BID, 3 short TID            VTE Risk Mitigation (From admission, onward)         Ordered     enoxaparin injection 40 mg  Daily      03/27/20 1029     IP VTE HIGH RISK PATIENT  Once      03/27/20 1511                      Matthew Blackwell MD  Department of Hospital Medicine   [facility]

## 2020-03-31 NOTE — CARE UPDATE
Rapid Response Nurse Chart Check     Chart check completed, abnormal VS noted. Bedside RNAlthea contacted, no concerns verbalized at this time, instructed to call [phone] for further concerns or assistance.

## 2020-03-31 NOTE — HPI
Mr. Cali Mabry is a 53yo M with PMHx of T2DM, HCV, HTN, polysubstance abuse/IVDU, pancreatitis and history of treatment for MRSA pneumonia and bacteremia at [facility] from 2/22 to 3/8 with 15 days of IV vancomycin and ceftriaxone/Zosyn. He was discharged to his mother's home and presented to [facility] as a transfer from [facility] ED for COVID rule out on 3/26 with a 2 day history of chest pain, and dyspnea. He was leukocytotic to WBC 20, and CT Chest in ED showed dense airspace disease as well as internal cavitations with air/fluid, with bilateral multifocal opacities/consolidations and trace right pneumothorax. COVID resulted as negative, and he has been treated with IV Vancomycin here. Follow up XR showed a large right pleural effusion and Pulmonary team was consulted for management. A right pleural chest tube was placed 3/29 with approx 450mL cloudy/purulent fluid evacuation. He was started on tPA/pulmozyme therapy with another 1000mL of fluid after the first dose, and approx 600mL after the second yesterday. Daily CXRs have shown improvement, his WBC has normalized to 8 this AM, and he is non-dyspneic on 2L NC O2. His most recent elevated temperature was 101.2 on 3/29.      SocHx: polysubstance abuse, IVDU, very remote foreign travel to [facility], hx of incarceration, no homelessness.

## 2020-03-31 NOTE — CONSULTS
[facility]  Cardiothoracic Surgery  Consult Note    Patient Name: Cali Mabry  MRN: [id number]  Admission Date: 3/26/2020  Attending Physician: Jered Diehl MD  Referring Provider: Self, Aaareferral    Patient information was obtained from patient, past medical records and ER records.     Inpatient consult to Cardiothoracic Surgery  Consult performed by: Melva Azevedo MD  Consult ordered by: Chetan Agustin MD  Reason for consult: R pleural effusion  Assessment/Recommendations: Mr. Cali Mabry is a 55yo M with PMHx of T2DM, HCV, HTN, polysubstance abuse/IVDU, pancreatitis and history of treatment for MRSA pneumonia and bacteremia presenting with large right pleural effusion concerning for parapneumonic effusion vs empyema.     -s/p R chest tube and 2 doses of tPA/pulmozyme (one on 3/30, one on 3/29)  -has responded with approx 2L of output and vastly improved chest XR, down to 2L NC O2 and normalized WBC  -agree with pulmonology plans of continuing lytic therapy to avoid need for surgical intervention  -if still have concerns for septic emboli 2/2 vegetations, recommend FREYA   -no surgical intervention needed as he has excellent clinical response  -would not recommend further imaging such as repeat CT chest as, given the magnitude of clinical response, he would not meet indications for an operation despite the appearance of an interval CT        Subjective:     Principal Problem: Acute respiratory failure with hypoxia    History of Present Illness: Mr. Cali Mabry is a 55yo M with PMHx of T2DM, HCV, HTN, polysubstance abuse/IVDU, pancreatitis and history of treatment for MRSA pneumonia and bacteremia at [facility] from 2/22 to 3/8 with 15 days of IV vancomycin and ceftriaxone/Zosyn. He was discharged to his mother's home and presented to [facility] as a transfer from [facility] ED for COVID rule out on 3/26 with a 2 day history of chest pain, and dyspnea. He  was leukocytotic to WBC 20, and CT Chest in ED showed dense airspace disease as well as internal cavitations with air/fluid, with bilateral multifocal opacities/consolidations and trace right pneumothorax. COVID resulted as negative, and he has been treated with IV Vancomycin here. Follow up XR showed a large right pleural effusion and Pulmonary team was consulted for management. A right pleural chest tube was placed 3/29 with approx 450mL cloudy/purulent fluid evacuation. He was started on tPA/pulmozyme therapy with another 1000mL of fluid after the first dose, and approx 600mL after the second yesterday. Daily CXRs have shown improvement, his WBC has normalized to 8 this AM, and he is non-dyspneic on 2L NC O2. His most recent elevated temperature was 101.2 on 3/29.      SocHx: polysubstance abuse, IVDU, very remote foreign travel to [facility], hx of incarceration, no homelessness.      Review of Systems   Constitutional: Negative for fever.   Respiratory: Negative for cough (Denies).    Cardiovascular: Negative for chest pain (Pleuritic) and leg swelling.   Gastrointestinal: Negative for abdominal pain, diarrhea, nausea and vomiting.   Genitourinary: Negative for dysuria.   Musculoskeletal: Negative for gait problem and joint swelling.   Skin: Negative for wound.     Objective:     Vital Signs (Most Recent):  Temp: 97.8 °F (36.6 °C) (03/31/20 0915)  Pulse: 85 (03/31/20 0921)  Resp: (!) 25 (03/31/20 0921)  BP: 103/65 (03/31/20 0921)  SpO2: 99 % (03/31/20 0921) Vital Signs (24h Range):  Temp:  [97.8 °F (36.6 °C)-99.3 °F (37.4 °C)] 97.8 °F (36.6 °C)  Pulse:  [] 85  Resp:  [14-34] 25  SpO2:  [94 %-99 %] 99 %  BP: ()/(61-98) 103/65     Weight: 59.5 kg (131 lb 2.8 oz)  Body mass index is 19.94 kg/m².    Estimated Creatinine Clearance: 79.9 mL/min (based on SCr of 0.9 mg/dL).    Physical Exam   Constitutional: He appears well-developed. No distress.   Thin   HENT:   Head: Normocephalic and atraumatic.   Eyes:  Conjunctivae and EOM are normal.   Neck: Normal range of motion. No tracheal deviation present. No thyromegaly present.   Cardiovascular: Normal rate and regular rhythm.   No murmur heard.  Pulmonary/Chest: Effort normal and breath sounds normal. No respiratory distress.   On 2L NC   Abdominal: Bowel sounds are normal. He exhibits no distension. There is no tenderness.   Musculoskeletal: He exhibits no edema.   Neurological: He is alert.   Waxing and waning alertness and orientation per reports and review   Skin: Skin is warm. Capillary refill takes less than 2 seconds. He is not diaphoretic.   Vitals reviewed.      Significant Labs:   Blood Culture:   Recent Labs   Lab 03/01/20  0842 03/03/20  1925 03/03/20  1931 03/26/20  2157 03/29/20  1535   LABBLOO No growth after 5 days. No growth after 5 days. No growth after 5 days. No Growth after 4 days.   No Growth after 4 days.  No Growth to date  No Growth to date  No Growth to date  No Growth to date     CBC:   Recent Labs   Lab 03/30/20  0641 03/31/20  0530   WBC 11.07 8.63   HGB 8.2* 7.5*   HCT 26.4* 24.6*   * 447*     CMP:   Recent Labs   Lab 03/29/20  1917  03/30/20  0641 03/30/20  1839 03/31/20  0004 03/31/20  0530   NA  --    < > 125* 127* 126* 126*  126*   K  --    < > 4.1 4.2 4.1 4.2  4.2   CL  --    < > 93* 94* 93* 94*  94*   CO2  --    < > 23 25 27 27  27   GLU  --    < > 283* 310* 263* 309*  309*   BUN  --    < > 13 13 14 13  13   CREATININE  --    < > 0.8 0.9 0.9 0.9  0.9   CALCIUM  --    < > 8.2* 8.2* 8.4* 8.6*  8.6*   PROT 7.9  --  7.5  --   --  7.5   ALBUMIN  --   --  1.6*  --   --  1.6*   BILITOT  --   --  0.5  --   --  0.4   ALKPHOS  --   --  90  --   --  89   AST  --   --  11  --   --  13   ALT  --   --  10  --   --  11   ANIONGAP  --    < > 9 8 6* 5*  5*   EGFRNONAA  --    < > >60.0 >60.0 >60.0 >60.0  >60.0    < > = values in this interval not displayed.     Respiratory Culture:   Recent Labs   Lab 02/23/20  0200 03/29/20  3998    GSRESP <10 epithelial cells per low power field.  Moderate WBC's  Moderate Gram positive cocci in clusters  Few Gram positive rods <10 epithelial cells per low power field.  Many WBC's  Rare Gram positive rods  Rare Gram negative rods   RESPIRATORYC ESCHERICHIA COLI  Many  *  METHICILLIN RESISTANT STAPHYLOCOCCUS AUREUS  Many  * Further report to follow       Significant Imaging: I have reviewed all pertinent imaging results/findings within the past 24 hours.     CT Chest with Contrast 3/29/2020  -right basilar consolidation and infiltrate consistent with infection/pneumonia  -large adjacent complex pleural fluid collection with associated air foci; could represent a large empyema with loculated components  -adjacent peripheral pulmonary abscess is not excluded  -bilateral small complex collection or masses with mild cavitation also; could represent multifocal infection, neoplasm not excluded    Transthoracic Echo 3/29/2020  -technically difficult study  -EF 60%  -aortic valve appears structurally normal, normal leaflet mobility  -LVOT diameter 1.96 cm  -mitral valve appears structurally normal, normal leaflet mobility  -tricuspid valve appears structurally normal, normal leaflet mobility  -pulmonic valve not well visualized  -no pericardial effusion    Assessment/Plan:     Parapneumonic effusion  Mr. Cali Mabry is a 53yo M with PMHx of T2DM, HCV, HTN, polysubstance abuse/IVDU, pancreatitis and history of treatment for MRSA pneumonia and bacteremia presenting with large right pleural effusion concerning for parapneumonic effusion vs empyema.     -s/p R chest tube and 2 doses of tPA/pulmozyme (one on 3/30, one on 3/29)  -has responded with approx 2L of output and vastly improved chest XR, down to 2L NC O2 and normalized WBC  -agree with pulmonology plans of continuing lytic therapy to avoid need for surgical intervention  -if still have concerns for septic emboli 2/2 vegetations, recommend FREYA   -no  surgical intervention needed as he has excellent clinical response  -would not recommend further imaging such as repeat CT chest as, given the magnitude of clinical response, he would not meet indications for an operation despite the appearance of an interval CT    Thank you for your consult. I will sign off. Please contact us if you have any additional questions.    Melva Azevedo MD  Cardiothoracic Surgery  [facility]

## 2020-03-31 NOTE — ASSESSMENT & PLAN NOTE
Mr. Cali Mabry is a 53yo M with PMHx of T2DM, HCV, HTN, polysubstance abuse/IVDU, pancreatitis and history of treatment for MRSA pneumonia and bacteremia presenting with large right pleural effusion concerning for parapneumonic effusion vs empyema.     -s/p R chest tube and 2 doses of tPA/pulmozyme (one on 3/30, one on 3/29)  -has responded with approx 2L of output and vastly improved chest XR, down to 2L NC O2 and normalized WBC  -agree with pulmonology plans of continuing lytic therapy to avoid need for surgical intervention  -if still have concerns for septic emboli 2/2 vegetations, recommend FREYA   -no surgical intervention needed at this time as excellent clinical response  -will continue to follow  -continue daily CXR

## 2020-03-31 NOTE — ASSESSMENT & PLAN NOTE
Na studies consistent with pseudohyponatremia, possible also additional component of decreased effective art volume    - Rebolus fluids today  - Treat hyperglycemia, with insulin

## 2020-04-01 LAB
ALBUMIN SERPL BCP-MCNC: 1.6 G/DL (ref 3.5–5.2)
ALP SERPL-CCNC: 99 U/L (ref 55–135)
ALT SERPL W/O P-5'-P-CCNC: 11 U/L (ref 10–44)
ANION GAP SERPL CALC-SCNC: 10 MMOL/L (ref 8–16)
ANION GAP SERPL CALC-SCNC: 10 MMOL/L (ref 8–16)
ANION GAP SERPL CALC-SCNC: 9 MMOL/L (ref 8–16)
AST SERPL-CCNC: 15 U/L (ref 10–40)
BACTERIA SPEC AEROBE CULT: ABNORMAL
BASOPHILS # BLD AUTO: 0.03 K/UL (ref 0–0.2)
BASOPHILS NFR BLD: 0.2 % (ref 0–1.9)
BILIRUB SERPL-MCNC: 0.4 MG/DL (ref 0.1–1)
BUN SERPL-MCNC: 12 MG/DL (ref 6–20)
BUN SERPL-MCNC: 13 MG/DL (ref 6–20)
CALCIUM SERPL-MCNC: 7.8 MG/DL (ref 8.7–10.5)
CALCIUM SERPL-MCNC: 8.2 MG/DL (ref 8.7–10.5)
CALCIUM SERPL-MCNC: 8.3 MG/DL (ref 8.7–10.5)
CALCIUM SERPL-MCNC: 8.3 MG/DL (ref 8.7–10.5)
CALCIUM SERPL-MCNC: 8.5 MG/DL (ref 8.7–10.5)
CALCIUM SERPL-MCNC: 8.5 MG/DL (ref 8.7–10.5)
CHLORIDE SERPL-SCNC: 91 MMOL/L (ref 95–110)
CHLORIDE SERPL-SCNC: 95 MMOL/L (ref 95–110)
CHLORIDE SERPL-SCNC: 95 MMOL/L (ref 95–110)
CHLORIDE SERPL-SCNC: 96 MMOL/L (ref 95–110)
CO2 SERPL-SCNC: 21 MMOL/L (ref 23–29)
CO2 SERPL-SCNC: 22 MMOL/L (ref 23–29)
CO2 SERPL-SCNC: 23 MMOL/L (ref 23–29)
CO2 SERPL-SCNC: 25 MMOL/L (ref 23–29)
CREAT SERPL-MCNC: 0.8 MG/DL (ref 0.5–1.4)
CREAT SERPL-MCNC: 0.9 MG/DL (ref 0.5–1.4)
DIFFERENTIAL METHOD: ABNORMAL
EOSINOPHIL # BLD AUTO: 0 K/UL (ref 0–0.5)
EOSINOPHIL NFR BLD: 0 % (ref 0–8)
ERYTHROCYTE [DISTWIDTH] IN BLOOD BY AUTOMATED COUNT: 16.9 % (ref 11.5–14.5)
EST. GFR  (AFRICAN AMERICAN): >60 ML/MIN/1.73 M^2
EST. GFR  (NON AFRICAN AMERICAN): >60 ML/MIN/1.73 M^2
GLUCOSE SERPL-MCNC: 151 MG/DL (ref 70–110)
GLUCOSE SERPL-MCNC: 219 MG/DL (ref 70–110)
GLUCOSE SERPL-MCNC: 278 MG/DL (ref 70–110)
GLUCOSE SERPL-MCNC: 278 MG/DL (ref 70–110)
GLUCOSE SERPL-MCNC: 279 MG/DL (ref 70–110)
GLUCOSE SERPL-MCNC: 280 MG/DL (ref 70–110)
GRAM STN SPEC: ABNORMAL
HCT VFR BLD AUTO: 26.6 % (ref 40–54)
HGB BLD-MCNC: 8.1 G/DL (ref 14–18)
IMM GRANULOCYTES # BLD AUTO: 0.31 K/UL (ref 0–0.04)
IMM GRANULOCYTES NFR BLD AUTO: 1.7 % (ref 0–0.5)
LYMPHOCYTES # BLD AUTO: 1.1 K/UL (ref 1–4.8)
LYMPHOCYTES NFR BLD: 6 % (ref 18–48)
MAGNESIUM SERPL-MCNC: 1.7 MG/DL (ref 1.6–2.6)
MCH RBC QN AUTO: 25.2 PG (ref 27–31)
MCHC RBC AUTO-ENTMCNC: 30.5 G/DL (ref 32–36)
MCV RBC AUTO: 83 FL (ref 82–98)
MONOCYTES # BLD AUTO: 1.3 K/UL (ref 0.3–1)
MONOCYTES NFR BLD: 6.9 % (ref 4–15)
NEUTROPHILS # BLD AUTO: 15.5 K/UL (ref 1.8–7.7)
NEUTROPHILS NFR BLD: 85.2 % (ref 38–73)
NRBC BLD-RTO: 0 /100 WBC
PHOSPHATE SERPL-MCNC: 2.9 MG/DL (ref 2.7–4.5)
PLATELET # BLD AUTO: 693 K/UL (ref 150–350)
PMV BLD AUTO: 9.6 FL (ref 9.2–12.9)
POCT GLUCOSE: 176 MG/DL (ref 70–110)
POCT GLUCOSE: 239 MG/DL (ref 70–110)
POCT GLUCOSE: 291 MG/DL (ref 70–110)
POCT GLUCOSE: 332 MG/DL (ref 70–110)
POTASSIUM SERPL-SCNC: 4 MMOL/L (ref 3.5–5.1)
POTASSIUM SERPL-SCNC: 4.6 MMOL/L (ref 3.5–5.1)
POTASSIUM SERPL-SCNC: 4.7 MMOL/L (ref 3.5–5.1)
POTASSIUM SERPL-SCNC: 5.5 MMOL/L (ref 3.5–5.1)
PROT SERPL-MCNC: 7.5 G/DL (ref 6–8.4)
RBC # BLD AUTO: 3.21 M/UL (ref 4.6–6.2)
SODIUM SERPL-SCNC: 123 MMOL/L (ref 136–145)
SODIUM SERPL-SCNC: 125 MMOL/L (ref 136–145)
SODIUM SERPL-SCNC: 129 MMOL/L (ref 136–145)
SODIUM SERPL-SCNC: 129 MMOL/L (ref 136–145)
WBC # BLD AUTO: 18.22 K/UL (ref 3.9–12.7)

## 2020-04-01 PROCEDURE — 25000003 PHARM REV CODE 250: Performed by: STUDENT IN AN ORGANIZED HEALTH CARE EDUCATION/TRAINING PROGRAM

## 2020-04-01 PROCEDURE — 80048 BASIC METABOLIC PNL TOTAL CA: CPT | Mod: 91

## 2020-04-01 PROCEDURE — 93005 ELECTROCARDIOGRAM TRACING: CPT

## 2020-04-01 PROCEDURE — 93010 EKG 12-LEAD: ICD-10-PCS | Mod: ,,, | Performed by: INTERNAL MEDICINE

## 2020-04-01 PROCEDURE — 20600001 HC STEP DOWN PRIVATE ROOM

## 2020-04-01 PROCEDURE — 63600175 PHARM REV CODE 636 W HCPCS: Mod: JG

## 2020-04-01 PROCEDURE — 63600175 PHARM REV CODE 636 W HCPCS: Performed by: STUDENT IN AN ORGANIZED HEALTH CARE EDUCATION/TRAINING PROGRAM

## 2020-04-01 PROCEDURE — 85025 COMPLETE CBC W/AUTO DIFF WBC: CPT

## 2020-04-01 PROCEDURE — 99233 PR SUBSEQUENT HOSPITAL CARE,LEVL III: ICD-10-PCS | Mod: GC,,, | Performed by: STUDENT IN AN ORGANIZED HEALTH CARE EDUCATION/TRAINING PROGRAM

## 2020-04-01 PROCEDURE — 36415 COLL VENOUS BLD VENIPUNCTURE: CPT

## 2020-04-01 PROCEDURE — 99233 SBSQ HOSP IP/OBS HIGH 50: CPT | Mod: GC,,, | Performed by: STUDENT IN AN ORGANIZED HEALTH CARE EDUCATION/TRAINING PROGRAM

## 2020-04-01 PROCEDURE — 93010 ELECTROCARDIOGRAM REPORT: CPT | Mod: ,,, | Performed by: INTERNAL MEDICINE

## 2020-04-01 PROCEDURE — 99233 SBSQ HOSP IP/OBS HIGH 50: CPT | Mod: ,,, | Performed by: INTERNAL MEDICINE

## 2020-04-01 PROCEDURE — 25000003 PHARM REV CODE 250: Performed by: INTERNAL MEDICINE

## 2020-04-01 PROCEDURE — 99233 PR SUBSEQUENT HOSPITAL CARE,LEVL III: ICD-10-PCS | Mod: ,,, | Performed by: INTERNAL MEDICINE

## 2020-04-01 PROCEDURE — 93010 EKG 12-LEAD: ICD-10-PCS | Mod: 77,ICN,, | Performed by: INTERNAL MEDICINE

## 2020-04-01 PROCEDURE — 25000242 PHARM REV CODE 250 ALT 637 W/ HCPCS

## 2020-04-01 PROCEDURE — 63600175 PHARM REV CODE 636 W HCPCS: Performed by: NURSE PRACTITIONER

## 2020-04-01 PROCEDURE — A4217 STERILE WATER/SALINE, 500 ML: HCPCS

## 2020-04-01 PROCEDURE — 93010 ELECTROCARDIOGRAM REPORT: CPT | Mod: 77,ICN,, | Performed by: INTERNAL MEDICINE

## 2020-04-01 PROCEDURE — 83735 ASSAY OF MAGNESIUM: CPT

## 2020-04-01 PROCEDURE — 87040 BLOOD CULTURE FOR BACTERIA: CPT | Mod: 59

## 2020-04-01 PROCEDURE — 84100 ASSAY OF PHOSPHORUS: CPT

## 2020-04-01 PROCEDURE — 25000003 PHARM REV CODE 250: Performed by: PHYSICIAN ASSISTANT

## 2020-04-01 PROCEDURE — 63600175 PHARM REV CODE 636 W HCPCS: Performed by: PHYSICIAN ASSISTANT

## 2020-04-01 PROCEDURE — 80053 COMPREHEN METABOLIC PANEL: CPT

## 2020-04-01 RX ORDER — HYDROMORPHONE HYDROCHLORIDE 1 MG/ML
0.5 INJECTION, SOLUTION INTRAMUSCULAR; INTRAVENOUS; SUBCUTANEOUS ONCE
Status: COMPLETED | OUTPATIENT
Start: 2020-04-01 | End: 2020-04-01

## 2020-04-01 RX ADMIN — OXYCODONE HYDROCHLORIDE 10 MG: 10 TABLET ORAL at 09:04

## 2020-04-01 RX ADMIN — Medication 6 MG: at 09:04

## 2020-04-01 RX ADMIN — CEFEPIME 2 G: 2 INJECTION, POWDER, FOR SOLUTION INTRAVENOUS at 08:04

## 2020-04-01 RX ADMIN — INSULIN ASPART 3 UNITS: 100 INJECTION, SOLUTION INTRAVENOUS; SUBCUTANEOUS at 12:04

## 2020-04-01 RX ADMIN — INSULIN ASPART 3 UNITS: 100 INJECTION, SOLUTION INTRAVENOUS; SUBCUTANEOUS at 06:04

## 2020-04-01 RX ADMIN — OXYCODONE HYDROCHLORIDE 10 MG: 10 TABLET ORAL at 12:04

## 2020-04-01 RX ADMIN — VANCOMYCIN HYDROCHLORIDE 1000 MG: 1 INJECTION, POWDER, LYOPHILIZED, FOR SOLUTION INTRAVENOUS at 12:04

## 2020-04-01 RX ADMIN — DORNASE ALFA 5 MG: 1 SOLUTION RESPIRATORY (INHALATION) at 05:04

## 2020-04-01 RX ADMIN — CEFEPIME 2 G: 2 INJECTION, POWDER, FOR SOLUTION INTRAVENOUS at 12:04

## 2020-04-01 RX ADMIN — OXYCODONE HYDROCHLORIDE 10 MG: 10 TABLET ORAL at 04:04

## 2020-04-01 RX ADMIN — ATORVASTATIN CALCIUM 20 MG: 20 TABLET, FILM COATED ORAL at 08:04

## 2020-04-01 RX ADMIN — HYDROMORPHONE HYDROCHLORIDE 0.5 MG: 1 INJECTION, SOLUTION INTRAMUSCULAR; INTRAVENOUS; SUBCUTANEOUS at 12:04

## 2020-04-01 RX ADMIN — CEFTRIAXONE 2 G: 2 INJECTION, SOLUTION INTRAVENOUS at 10:04

## 2020-04-01 RX ADMIN — VANCOMYCIN HYDROCHLORIDE 1000 MG: 1 INJECTION, POWDER, LYOPHILIZED, FOR SOLUTION INTRAVENOUS at 11:04

## 2020-04-01 RX ADMIN — INSULIN ASPART 3 UNITS: 100 INJECTION, SOLUTION INTRAVENOUS; SUBCUTANEOUS at 08:04

## 2020-04-01 RX ADMIN — Medication 6 MG: at 12:04

## 2020-04-01 RX ADMIN — ENOXAPARIN SODIUM 40 MG: 100 INJECTION SUBCUTANEOUS at 09:04

## 2020-04-01 RX ADMIN — ALTEPLASE 10 MG: 2.2 INJECTION, POWDER, LYOPHILIZED, FOR SOLUTION INTRAVENOUS at 05:04

## 2020-04-01 RX ADMIN — OXYCODONE HYDROCHLORIDE 10 MG: 10 TABLET ORAL at 08:04

## 2020-04-01 RX ADMIN — INSULIN ASPART 2 UNITS: 100 INJECTION, SOLUTION INTRAVENOUS; SUBCUTANEOUS at 06:04

## 2020-04-01 RX ADMIN — ACETAMINOPHEN 650 MG: 325 TABLET ORAL at 10:04

## 2020-04-01 RX ADMIN — OXYCODONE HYDROCHLORIDE 10 MG: 10 TABLET ORAL at 06:04

## 2020-04-01 RX ADMIN — ACETAMINOPHEN 650 MG: 325 TABLET ORAL at 09:04

## 2020-04-01 RX ADMIN — SODIUM CHLORIDE 1000 ML: 0.9 INJECTION, SOLUTION INTRAVENOUS at 08:04

## 2020-04-01 RX ADMIN — INSULIN ASPART 4 UNITS: 100 INJECTION, SOLUTION INTRAVENOUS; SUBCUTANEOUS at 08:04

## 2020-04-01 NOTE — PROGRESS NOTES
"[facility] Hospital Medicine  Progress Note    Patient Name: Cali Mabry  MRN: [id number]  Patient Class: IP- Inpatient   Admission Date: 3/26/2020  Length of Stay: 5 days  Attending Physician: Jered Diehl MD  Primary Care Provider: Primary Doctor No    Hospital Medicine Team: [facility] HOSP MED 1 Matthew Balckwell MD    Subjective:     Principal Problem:Acute respiratory failure with hypoxia        HPI:  Cali Mabry is a 54 y.o. with DM II, hepatitis C, HTN, polysubstance abuse/IVDU, pancreatitis who presents, after transfer from [facility], with a 2 day history of chest pain and shortness of breath.  Chest pain is centralized and just described as "painful." Pain aggravated with movement and deep breathing. He reports an intermittent cough that is non-productive. He denies sick contacts, fever, chills, sore throat. Upon questioning, patient stated "I have the virus so just treat me for it." Educated patient that testing is still in process. Inquired about IV drug use however he was unable to recall when he last used. Patient swatted examiner's hand after chest palpation, yelling "that hurts!" Patient became aggravated, reported his pain was now worse and requested pain medication. Per nurse, patient given PRN dose an hour prior. Patient encouraged to continue discussing his plan of care however stated, "I don't want to chat."   ED: Afebrile with leukocytosis. CTA chest from 3/26 with dense consolidative changes within the right lower lobe with internal cavitation containing air and fluid; Occlusion is seen of the right lower lobe bronchi which may be secondary to mucoid impaction or aspirated material; Additional multifocal opacities and cavitations.  Vancomycin and Zosyn administered. On 2L NC. VSS. Admitted to hospital medicine for further evaluation and COVID-19 rule out.        Overview/Hospital Course:  55 yo male with IVDU with Hep C, hx of MRSA (V sens), Pancreatitis, " DM2, who presents as a transfer from [facility] for SOB and CP, CT + multifocal apical cavitations and opacities, assoc. w/ air +fluid, suspected due to MRSA pneumonia c/b parapneumonic empyema and possible septic embolization. covid -, s/d 3/28   Chest tube placed 3/30 qhs on TPA x 6 doses (2/6 doses on 3/30 and 3/29). CTS no intervention for now. TTE negative.     04/01/2020 No acute events overnight. There was some concern with hemoptysis, however on discussion with RN and patient, there was no witnessed hemoptysis or active bleed or clots. There was a pink fluid visible in a container, and source is not clear. Patient received extra dose of 0.5 dilaudid x 2 on 3/31 and 4/1/20. He denies any difficulties breathing. He is resting comfortably in bed, watching tv, pain appears controlled.     Interval History: 04/01/2020 No acute events overnight. There was some concern with hemoptysis, however on discussion with RN and patient, there was no witnessed hemoptysis or active bleed or clots. There was a pink fluid visible in a container, and source is not clear. Patient received extra dose of 0.5 dilaudid x 2 on 3/31 and 4/1/20. He denies any difficulties breathing. He is resting comfortably in bed, watching tv, pain appears controlled.     Review of Systems   Constitutional: Positive for fatigue. Negative for fever.   Respiratory: Negative for cough (Denies) and shortness of breath.    Cardiovascular: Positive for chest pain (Pleuritic). Negative for leg swelling.   Gastrointestinal: Negative for abdominal pain, diarrhea, nausea and vomiting.   Genitourinary: Negative for dysuria.     Objective:     Vital Signs (Most Recent):  Temp: 98.1 °F (36.7 °C) (04/01/20 1125)  Pulse: 107 (04/01/20 1125)  Resp: 18 (04/01/20 1125)  BP: 129/65 (04/01/20 1125)  SpO2: 95 % (04/01/20 1125) Vital Signs (24h Range):  Temp:  [98 °F (36.7 °C)-98.8 °F (37.1 °C)] 98.1 °F (36.7 °C)  Pulse:  [] 107  Resp:  [18-35] 18  SpO2:  [90 %-97 %]  95 %  BP: (114-150)/(63-85) 129/65     Weight: 59.5 kg (131 lb 2.8 oz)  Body mass index is 19.94 kg/m².    Intake/Output Summary (Last 24 hours) at 4/1/2020 1220  Last data filed at 4/1/2020 0827  Gross per 24 hour   Intake 1190 ml   Output 885 ml   Net 305 ml      Physical Exam   Constitutional: He is oriented to person, place, and time. He appears well-developed. No distress.   Thin   HENT:   Head: Normocephalic and atraumatic.   Eyes: Pupils are equal, round, and reactive to light. EOM are normal.   Neck: Normal range of motion. No tracheal deviation present. No thyromegaly present.   Cardiovascular: Normal rate and normal heart sounds.   No murmur heard.  Tachycardia   Pulmonary/Chest: No respiratory distress. He has rales.   Abdominal: Bowel sounds are normal. He exhibits no distension.   Musculoskeletal: Normal range of motion. He exhibits no edema.   Neurological: He is alert and oriented to person, place, and time. No cranial nerve deficit.   Skin: He is not diaphoretic.   Nursing note and vitals reviewed.      Significant Labs:   CBC:   Recent Labs   Lab 03/31/20  0530 04/01/20  0610   WBC 8.63 18.22*   HGB 7.5* 8.1*   HCT 24.6* 26.6*   * 693*     CMP:   Recent Labs   Lab 03/31/20  0530 03/31/20  1205 03/31/20  1802 04/01/20  0610   *  126* 126* 127*  127* 123*  123*  123*   K 4.2  4.2 4.5 4.2  4.2 4.6  4.6  4.6   CL 94*  94* 92* 95  95 91*  91*  91*   CO2 27  27 27 25  25 23  23  22*   *  309* 340* 213*  213* 279*  278*  278*   BUN 13  13 14 12  12 13  13  13   CREATININE 0.9  0.9 1.0 0.8  0.8 0.8  0.8  0.8   CALCIUM 8.6*  8.6* 8.8 8.2*  8.2* 8.3*  8.3*  8.2*   PROT 7.5  --   --  7.5   ALBUMIN 1.6*  --   --  1.6*   BILITOT 0.4  --   --  0.4   ALKPHOS 89  --   --  99   AST 13  --   --  15   ALT 11  --   --  11   ANIONGAP 5*  5* 7* 7*  7* 9  9  10   EGFRNONAA >60.0  >60.0 >60.0 >60.0  >60.0 >60.0  >60.0  >60.0       Significant Imaging: I have  reviewed and interpreted all pertinent imaging results/findings within the past 24 hours.      Assessment/Plan:      * Acute respiratory failure with hypoxia  55 yo male with IVDU with Hep C, hx of MRSA (V sens), Pancreatitis, DM2, who presents as a transfer from [facility] for SOB and CP, CT + multifocal apical cavitations and opacities, assoc. w/ air +fluid, suspected due to MRSA pneumonia c/b parapneumonic empyema and possible septic embolization. covid -, s/d 3/28   Chest tube placed 3/30 qhs on TPA x 6 doses (2/6 doses on 3/30 and 3/29). CTS no intervention for now. TTE negative.     Plan:  - Repeat blood cultures today, with increase WBC count but remains afebrile. Was tachypneic and tachycardic overnight, which appears to have improved with fluids. Continue to monitor closely for development of new or progression of current infectious sources. Can consider CTA for developoment of PE or septic emboli, however, if reveals septic emboli then would be unable to treat with anticoagulation. Continue broad spectrum therapy for now.     - Continue TB rule out, pending AFB samples  - Continue broad spectrum therapy, on vanc and ceftriaxone, final ID recommendations pending, FREYA vs broad spectrum therapy.   - CTS consulted, unlikely to perform intervention, has had good output and decrease on O2 requirements  - TPA per pulmonology, received 5/5 doses with good output in chest tube and improvement in respiratory status.             Septic embolism  TTE negative  Neuro checks q4h.       Parapneumonic effusion  See above       Hyponatremia  Na studies consistent with pseudohyponatremia, possible also additional component of decreased effective art volume    - Rebolus fluids today  - Treat hyperglycemia, with insulin   - Na does appear to be decreasing with fluid administration. Continue q6 monitoring. Will start fluid restriction.     Chest pain  Improved     -oxyCODONE immediate release [id number] mg as patient his pain is  no controlled PRN, moderate pain  - should get better with draining of fluid    Tobacco abuse        Chronic hepatitis C        Anemia of chronic disease        Intravenous drug abuse    -counseled on cessation      Hyperlipidemia  - continue statin       Essential hypertension  Stable, not on meds       Type 2 diabetes mellitus, uncontrolled  -HbA1c 11  - Insulin adjusted 10 long BID, 3 short TID          Opioid dependence, continuous          VTE Risk Mitigation (From admission, onward)         Ordered     enoxaparin injection 40 mg  Daily      03/27/20 1029     IP VTE HIGH RISK PATIENT  Once      03/27/20 1511                      Matthew Blackwell MD  Department of Hospital Medicine   [facility]

## 2020-04-01 NOTE — PLAN OF CARE
Pt free of falls and injury during shift. POC reviewed with pt VS stable and AAox4. ST On telemetry. 2L O2 via NC. CT in place connected to suction, MD came to bed side admin tpa/pulmozyme. PRN oxycodone admin, tylenol, melatonin admin. X1 doseof diludid admin. Vanc and cefepime admin. No acute events noted at this time. No complaints. Educated pt why he is at risk for falls and to use call light for assistance ambulating. Yellow non-slip socks on pt. Bed low and locked, call light with in reach. Will continue to monitor.

## 2020-04-01 NOTE — ASSESSMENT & PLAN NOTE
Na studies consistent with pseudohyponatremia, possible also additional component of decreased effective art volume    - Rebolus fluids today  - Treat hyperglycemia, with insulin   - Na does appear to be decreasing with fluid administration. Continue q6 monitoring. Will start fluid restriction.

## 2020-04-01 NOTE — PROGRESS NOTES
[facility]  Infectious Disease  Progress Note    Patient Name: Cali Mabry  MRN: [id number]  Admission Date: 3/26/2020  Length of Stay: 5 days  Attending Physician: Jered Diehl MD  Primary Care Provider: Primary Doctor No    Isolation Status: Airborne  Assessment/Plan:      Septic embolism  54 y.o. male with HCV, polysubstance use/ IVDU transferred from [facility] ED where he presented with right sided chest and flank pain X 2 days, COVID rule out.  History of recent admission at [facility] from 2/22 to 3/8 with PNA and MRSA bacteremia, treated with 15 days  IV abx (vancomycin and ceftriaxone/zosyn then discharged. On admission, Afebrile and  WBC 20. Last HIV 2/26/20 negative.     Flu and COVID negative.  Blood cultures remain with NGTD.  Respiratory culture pending, QG  And AFB pending.Patient denies any known exposure to TB.  Very remote foreign travel to [facility].  Does have history of incarceration, no homelessness. Low suspicion for TB or fungal infection at this point, but will rule out.      CTA chest on 3/26 showed dense consolidative changes in RLL with interval cavitation containing air and fluid.  Occlusion seen in RLL bronchi ? Secondary to mucoid impaction or aspirated material. Trace right sided pneumothorax noted. Noted to have multifocal opacities and cavitations, though improved from CT of 3/4 (last admission).  CXR showed with significantly increased opacification of right lung - likely combination of airspace disease and pleural fluid. TTE on 3/29 with no vegetations.  Suspect unresolved worsening MRSA PNA.  Appreciate pulmonary assistance, CT placed on 3/29.  Sputum CX - MRSA and Klebsiella pneumonia. Also positive for rhinoviruses, enterovirus  Pleural culture - lactobacillus rhamnosus   Quant gold - indeterminate     Plan/recommendations:  -   Continue IV vancomycin for 6 weeks. Maintain trough 15-20 for presumed endocarditis given septic emboli appearance in  lung imaging.   - Can switch Cefepime to cefpodoxime for 4 weeks, repeat CT chest in 4 weeks to look for resolution and decide if antibiotics can be stopped.   -  Follow up AFB q 8 hours X 3 with one early morning collection and sputum for MTB pcr to rule out Tb  -  Follow up in 2 weeks through Telemedicine with ID if discharged, will need weekly labs CBC, CMP, ESR,CRP and Vanc trough.               Anticipated Disposition: TBD    Thank you for your consult. I will sign off. Please contact us if you have any additional questions.    Lenny Barrera MD  Infectious Disease  [facility]    Subjective:     Principal Problem:Acute respiratory failure with hypoxia    HPI: Cali Mabry is a 54 year old with chronic pancreatitis, DM, HTN, HCV, IVDU who was transferred from [facility] where he presented with right chest and flank pain X 2 days, COVID rule out.  Recent admission at [facility] from 2/22 to 3/8 with PNA and MRSA bacteremia, treated with IV abx, then discharged.   Complains of chest pain worse with movement and inspiration. Intermittent non-productive cough.  Denies fevers, chills, sore throat.  Last IVDU unclear.    Afebrile on admit with WBC 20.  Procal 10.09, Ferritin 265, ESR 72, CRP 50.    Flu negative, COVID negative.   Blood cultures NGTD.  Respiratory culture pending, QG pending.     CTA chest 3/26 - negative for PE; dense consolidative changes in RLL with interval cavitation containing air and fluid.  Occlusion seen in RLL bronchi ? Secondary to mucoid impaction or aspirated material.   Noted to have multifocal opacities and cavitations, small pneumothorax though improved from CT of 3/4.      CXR 3/29 appears to have increasing opacities right side.  Pulmonary consulted.  Currently on IV vancomycin and cefepime     Patient states he has been living with is mother since last hospital discharge.  Mother has been healthy, no recent illness.  Denies fevers at home, no night  sweats.  Has had poor appetite and believes lost weight because of this.  No significant cough or SOB.  No hemoptysis.  Primary complaint is right sided chest pain with movement and inspiration.      Risk factors for TB;  No known exposures  Remote foreign travel to [facility]  Does have history of incarceration  No homelessness     Interval History:      Patient complains of right sided chest pain, cough with hemopytsis. CT in place. Remains afebrile    Review of Systems   Constitutional: Positive for fatigue, loss of appetite  Respiratory: Positive for shortness of breath and cough  Cardiovascular: Positive for chest pain (Pleuritic). Negative for leg swelling.   Gastrointestinal: Negative for abdominal pain, diarrhea, nausea and vomiting.   Genitourinary: Negative for dysuria.     Objective:     Vital Signs (Most Recent):  Temp: 98.1 °F (36.7 °C) (04/01/20 1125)  Pulse: 96 (04/01/20 1508)  Resp: 18 (04/01/20 1125)  BP: 129/65 (04/01/20 1125)  SpO2: 95 % (04/01/20 1125) Vital Signs (24h Range):  Temp:  [98 °F (36.7 °C)-98.8 °F (37.1 °C)] 98.1 °F (36.7 °C)  Pulse:  [] 96  Resp:  [18-35] 18  SpO2:  [90 %-97 %] 95 %  BP: (114-150)/(63-85) 129/65     Weight: 59.5 kg (131 lb 2.8 oz)  Body mass index is 19.94 kg/m².    Estimated Creatinine Clearance: 89.9 mL/min (based on SCr of 0.8 mg/dL).    Physical Exam   Constitutional: He appears well-developed. No distress.   Thin   HENT:   Head: Normocephalic and atraumatic.   Eyes: Pupils are equal, round, and reactive to light. EOM are normal.   Neck: Normal range of motion. No tracheal deviation present. No thyromegaly present.   Cardiovascular: Normal heart sounds.   No murmur heard.  Tachycardia   Pulmonary/Chest: No respiratory distress. He has rales.   On 3L NC   Abdominal: Bowel sounds are normal. He exhibits no distension.   Musculoskeletal: He exhibits no edema.   Neurological: He is alert.   0 x 3   Skin: He is not diaphoretic.   Vitals reviewed.      Significant  Labs:   Blood Culture:   Recent Labs   Lab 03/01/20  0842 03/03/20  1925 03/03/20  1931 03/26/20  2157 03/29/20  1535   LABBLOO No growth after 5 days. No growth after 5 days. No growth after 5 days. No Growth after 4 days.   No Growth after 4 days.  No Growth to date  No Growth to date  No Growth to date  No Growth to date  No Growth to date  No Growth to date     CBC:   Recent Labs   Lab 03/31/20  0530 04/01/20  0610   WBC 8.63 18.22*   HGB 7.5* 8.1*   HCT 24.6* 26.6*   * 693*     CMP:   Recent Labs   Lab 03/31/20  0530  03/31/20  1802 04/01/20  0610 04/01/20  1141   *  126*   < > 127*  127* 123*  123*  123* 125*   K 4.2  4.2   < > 4.2  4.2 4.6  4.6  4.6 4.7   CL 94*  94*   < > 95  95 91*  91*  91* 95   CO2 27  27   < > 25  25 23  23  22* 21*   *  309*   < > 213*  213* 279*  278*  278* 280*   BUN 13  13   < > 12  12 13  13  13 12   CREATININE 0.9  0.9   < > 0.8  0.8 0.8  0.8  0.8 0.8   CALCIUM 8.6*  8.6*   < > 8.2*  8.2* 8.3*  8.3*  8.2* 7.8*   PROT 7.5  --   --  7.5  --    ALBUMIN 1.6*  --   --  1.6*  --    BILITOT 0.4  --   --  0.4  --    ALKPHOS 89  --   --  99  --    AST 13  --   --  15  --    ALT 11  --   --  11  --    ANIONGAP 5*  5*   < > 7*  7* 9  9  10 9   EGFRNONAA >60.0  >60.0   < > >60.0  >60.0 >60.0  >60.0  >60.0 >60.0    < > = values in this interval not displayed.     Respiratory Culture:   Recent Labs   Lab 02/23/20  0200 03/29/20  1843   GSRESP <10 epithelial cells per low power field.  Moderate WBC's  Moderate Gram positive cocci in clusters  Few Gram positive rods <10 epithelial cells per low power field.  Many WBC's  Rare Gram positive rods  Rare Gram negative rods   RESPIRATORYC ESCHERICHIA COLI  Many  *  METHICILLIN RESISTANT STAPHYLOCOCCUS AUREUS  Many  * No Pseudomonas isolated.  METHICILLIN RESISTANT STAPHYLOCOCCUS AUREUS  Few  *  KLEBSIELLA PNEUMONIAE  Rare  Normal respiratory ella also present  *        Significant Imaging: I have reviewed all pertinent imaging results/findings within the past 24 hours.

## 2020-04-01 NOTE — PROGRESS NOTES
MD Jimmy made aware pt complaining of 10/10 pain. PRN oxycodone admin at 2226. MD stated she is allowed to order x1 dose ov IV Dilaudid. MD stated she will place order. WCTM

## 2020-04-01 NOTE — ASSESSMENT & PLAN NOTE
Improved     -oxyCODONE immediate release [id number] mg as patient his pain is no controlled PRN, moderate pain  - should get better with draining of fluid

## 2020-04-01 NOTE — NURSING TRANSFER
Nursing Transfer Note      4/1/2020     Transfer From: step down transplant    Transfer via bed    Transfer with  to O2, cardiac monitoring    Transported by RN x2    Medicines sent: yes    Chart send with patient: Yes    Patient reassessed at: 3/31/2020 @ 2040    Upon arrival to floor: cardiac monitor applied, patient oriented to room, call bell in reach and bed in lowest position

## 2020-04-01 NOTE — ASSESSMENT & PLAN NOTE
55 yo male with IVDU with Hep C, hx of MRSA (V sens), Pancreatitis, DM2, who presents as a transfer from [facility] for SOB and CP, CT + multifocal apical cavitations and opacities, assoc. w/ air +fluid, suspected due to MRSA pneumonia c/b parapneumonic empyema and possible septic embolization. covid -, s/d 3/28   Chest tube placed 3/30 qhs on TPA x 6 doses (2/6 doses on 3/30 and 3/29). CTS no intervention for now. TTE negative.     Plan:  - Repeat blood cultures today, with increase WBC count but remains afebrile. Was tachypneic and tachycardic overnight, which appears to have improved with fluids. Continue to monitor closely for development of new or progression of current infectious sources. Can consider CTA for developoment of PE or septic emboli, however, if reveals septic emboli then would be unable to treat with anticoagulation. Continue broad spectrum therapy for now.     - Continue TB rule out, pending AFB samples  - Continue broad spectrum therapy, on vanc and ceftriaxone, final ID recommendations pending, FREYA vs broad spectrum therapy.   - CTS consulted, unlikely to perform intervention, has had good output and decrease on O2 requirements  - TPA per pulmonology, received 5/5 doses with good output in chest tube and improvement in respiratory status.

## 2020-04-01 NOTE — PLAN OF CARE
Plan of care discussed with patient. Patient is free of fall/trauma/injury. Patient negative for COVID-19. Awaiting TB results. Denies CP, SOB, or pain/discomfort with little coughing. All questions addressed. Will continue to monitor

## 2020-04-01 NOTE — SUBJECTIVE & OBJECTIVE
Interval History: 04/01/2020 No acute events overnight. There was some concern with hemoptysis, however on discussion with RN and patient, there was no witnessed hemoptysis or active bleed or clots. There was a pink fluid visible in a container, and source is not clear. Patient received extra dose of 0.5 dilaudid x 2 on 3/31 and 4/1/20. He denies any difficulties breathing. He is resting comfortably in bed, watching tv, pain appears controlled.     Review of Systems   Constitutional: Positive for fatigue. Negative for fever.   Respiratory: Negative for cough (Denies) and shortness of breath.    Cardiovascular: Positive for chest pain (Pleuritic). Negative for leg swelling.   Gastrointestinal: Negative for abdominal pain, diarrhea, nausea and vomiting.   Genitourinary: Negative for dysuria.     Objective:     Vital Signs (Most Recent):  Temp: 98.1 °F (36.7 °C) (04/01/20 1125)  Pulse: 107 (04/01/20 1125)  Resp: 18 (04/01/20 1125)  BP: 129/65 (04/01/20 1125)  SpO2: 95 % (04/01/20 1125) Vital Signs (24h Range):  Temp:  [98 °F (36.7 °C)-98.8 °F (37.1 °C)] 98.1 °F (36.7 °C)  Pulse:  [] 107  Resp:  [18-35] 18  SpO2:  [90 %-97 %] 95 %  BP: (114-150)/(63-85) 129/65     Weight: 59.5 kg (131 lb 2.8 oz)  Body mass index is 19.94 kg/m².    Intake/Output Summary (Last 24 hours) at 4/1/2020 1220  Last data filed at 4/1/2020 0827  Gross per 24 hour   Intake 1190 ml   Output 885 ml   Net 305 ml      Physical Exam   Constitutional: He is oriented to person, place, and time. He appears well-developed. No distress.   Thin   HENT:   Head: Normocephalic and atraumatic.   Eyes: Pupils are equal, round, and reactive to light. EOM are normal.   Neck: Normal range of motion. No tracheal deviation present. No thyromegaly present.   Cardiovascular: Normal rate and normal heart sounds.   No murmur heard.  Tachycardia   Pulmonary/Chest: No respiratory distress. He has rales.   Abdominal: Bowel sounds are normal. He exhibits no distension.    Musculoskeletal: Normal range of motion. He exhibits no edema.   Neurological: He is alert and oriented to person, place, and time. No cranial nerve deficit.   Skin: He is not diaphoretic.   Nursing note and vitals reviewed.      Significant Labs:   CBC:   Recent Labs   Lab 03/31/20  0530 04/01/20  0610   WBC 8.63 18.22*   HGB 7.5* 8.1*   HCT 24.6* 26.6*   * 693*     CMP:   Recent Labs   Lab 03/31/20  0530 03/31/20  1205 03/31/20  1802 04/01/20  0610   *  126* 126* 127*  127* 123*  123*  123*   K 4.2  4.2 4.5 4.2  4.2 4.6  4.6  4.6   CL 94*  94* 92* 95  95 91*  91*  91*   CO2 27  27 27 25  25 23  23  22*   *  309* 340* 213*  213* 279*  278*  278*   BUN 13  13 14 12  12 13  13  13   CREATININE 0.9  0.9 1.0 0.8  0.8 0.8  0.8  0.8   CALCIUM 8.6*  8.6* 8.8 8.2*  8.2* 8.3*  8.3*  8.2*   PROT 7.5  --   --  7.5   ALBUMIN 1.6*  --   --  1.6*   BILITOT 0.4  --   --  0.4   ALKPHOS 89  --   --  99   AST 13  --   --  15   ALT 11  --   --  11   ANIONGAP 5*  5* 7* 7*  7* 9  9  10   EGFRNONAA >60.0  >60.0 >60.0 >60.0  >60.0 >60.0  >60.0  >60.0       Significant Imaging: I have reviewed and interpreted all pertinent imaging results/findings within the past 24 hours.

## 2020-04-01 NOTE — ASSESSMENT & PLAN NOTE
54 y.o. male with HCV, polysubstance use/ IVDU transferred from [facility] ED where he presented with right sided chest and flank pain X 2 days, COVID rule out.  History of recent admission at [facility] from 2/22 to 3/8 with PNA and MRSA bacteremia, treated with 15 days  IV abx (vancomycin and ceftriaxone/zosyn then discharged. On admission, Afebrile and  WBC 20. Last HIV 2/26/20 negative.     Flu and COVID negative.  Blood cultures remain with NGTD.  Respiratory culture pending, QG  And AFB pending.Patient denies any known exposure to TB.  Very remote foreign travel to [facility].  Does have history of incarceration, no homelessness. Low suspicion for TB or fungal infection at this point, but will rule out.      CTA chest on 3/26 showed dense consolidative changes in RLL with interval cavitation containing air and fluid.  Occlusion seen in RLL bronchi ? Secondary to mucoid impaction or aspirated material. Trace right sided pneumothorax noted. Noted to have multifocal opacities and cavitations, though improved from CT of 3/4 (last admission).  CXR showed with significantly increased opacification of right lung - likely combination of airspace disease and pleural fluid. TTE on 3/29 with no vegetations.  Suspect unresolved worsening MRSA PNA.  Appreciate pulmonary assistance, CT placed on 3/29.  Sputum CX - MRSA and Klebsiella pneumonia. Also positive for rhinoviruses, enterovirus  Pleural culture - lactobacillus rhamnosus   Quant gold - indeterminate     Plan/recommendations:  -   Continue IV vancomycin for 6 weeks. Maintain trough 15-20 for presumed endocarditis given septic emboli appearance in lung imaging.   - Can switch Cefepime to cefpodoxime for 4 weeks, repeat CT chest in 4 weeks to look for resolution and decide if antibiotics can be stopped.   -  Follow up AFB q 8 hours X 3 with one early morning collection and sputum for MTB pcr to rule out Tb  -  Follow up in 2 weeks through Telemedicine with ID if  discharged, will need weekly labs CBC, CMP, ESR,CRP and Vanc trough.

## 2020-04-01 NOTE — PROGRESS NOTES
MD Jimmy made aware pt complaining of 10/10 pain, R chest. PTN oxycodone admin at 1816. x1 dose of dilaudid admin at 1730. MD stated  admin PRN tylenol now. WCTM

## 2020-04-01 NOTE — PLAN OF CARE
04/01/20 1328   Discharge Assessment   Assessment Type Discharge Planning Assessment   Confirmed/corrected address and phone number on facesheet? Yes   Assessment information obtained from? Medical Record   Expected Length of Stay (days) 24   Communicated expected length of stay with patient/caregiver no   Prior to hospitilization cognitive status: Alert/Oriented   Prior to hospitalization functional status: Assistive Equipment   Current cognitive status: Unable to Assess   Current Functional Status: Assistive Equipment   Facility Arrived From: [facility]   Lives With alone   Able to Return to Prior Arrangements yes   Is patient able to care for self after discharge? Yes   Patient's perception of discharge disposition home or selfcare   Patient currently being followed by outpatient case management? No   Patient currently receives any other outside agency services? No   Equipment Currently Used at Home cane, straight   Do you have any problems affording any of your prescribed medications? No   Is the patient taking medications as prescribed? yes   Does the patient have transportation home? Yes   Transportation Anticipated family or friend will provide   Does the patient receive services at the [facility]? No   Discharge Plan A Home   Discharge Plan B Home   DME Needed Upon Discharge  medication pump   Patient/Family in Agreement with Plan unable to assess     CM attempted to call patient to complete d/c assessment remotely. Line is bust. Attempted to call patient's sister,  Amarilis Easley and was unable to leave message. Information obtained from medical record.    Pt is transfer from [facility]. Anticipate patient needing 6weeks of IV antibiotics. Pt is current IVDU and this will be a barrier to d/c as infusion companies and home health do not accept IVDU with lines.  CM will assist with d/c planning or transfer back to [facility] to receive antibiotics once patient is medically stable for transfer.      Primary Doctor No      [id number] - [place] - [address]  [address] [place] [address]  Phone: [phone] Fax: [phone]    [facility] ([place]) - [place] - [address] [place] [address]  [address] [place] [address]  [place] [address]  Phone: [phone] Fax: [phone]    Payor: [facility] MCARE / Plan: [facility] DUAL COMPLETE HMO SNP / Product Type: Medicare Advantage /     Julie Haase RN  Case Management [phone]

## 2020-04-01 NOTE — PROGRESS NOTES
Last dose of TPA/DNAase today. Chest tube to suction overnight to monitor output. Likely remove chest tube in the AM if there is not substantial output. CXR reveals near resolution of empyema.    Chetan Agustin MD  [facility] Pulmonary and Critical Care Fellow  Pager: [phone]  Cell: [phone]

## 2020-04-01 NOTE — SUBJECTIVE & OBJECTIVE
Interval History:      Patient complains of right sided chest pain, cough with hemopytsis. CT in place. Remains afebrile    Review of Systems   Constitutional: Positive for fatigue, loss of appetite  Respiratory: Positive for shortness of breath and cough  Cardiovascular: Positive for chest pain (Pleuritic). Negative for leg swelling.   Gastrointestinal: Negative for abdominal pain, diarrhea, nausea and vomiting.   Genitourinary: Negative for dysuria.     Objective:     Vital Signs (Most Recent):  Temp: 98.1 °F (36.7 °C) (04/01/20 1125)  Pulse: 96 (04/01/20 1508)  Resp: 18 (04/01/20 1125)  BP: 129/65 (04/01/20 1125)  SpO2: 95 % (04/01/20 1125) Vital Signs (24h Range):  Temp:  [98 °F (36.7 °C)-98.8 °F (37.1 °C)] 98.1 °F (36.7 °C)  Pulse:  [] 96  Resp:  [18-35] 18  SpO2:  [90 %-97 %] 95 %  BP: (114-150)/(63-85) 129/65     Weight: 59.5 kg (131 lb 2.8 oz)  Body mass index is 19.94 kg/m².    Estimated Creatinine Clearance: 89.9 mL/min (based on SCr of 0.8 mg/dL).    Physical Exam   Constitutional: He appears well-developed. No distress.   Thin   HENT:   Head: Normocephalic and atraumatic.   Eyes: Pupils are equal, round, and reactive to light. EOM are normal.   Neck: Normal range of motion. No tracheal deviation present. No thyromegaly present.   Cardiovascular: Normal heart sounds.   No murmur heard.  Tachycardia   Pulmonary/Chest: No respiratory distress. He has rales.   On 3L NC   Abdominal: Bowel sounds are normal. He exhibits no distension.   Musculoskeletal: He exhibits no edema.   Neurological: He is alert.   0 x 3   Skin: He is not diaphoretic.   Vitals reviewed.      Significant Labs:   Blood Culture:   Recent Labs   Lab 03/01/20  0842 03/03/20  1925 03/03/20  1931 03/26/20  2157 03/29/20  1535   LABBLOO No growth after 5 days. No growth after 5 days. No growth after 5 days. No Growth after 4 days.   No Growth after 4 days.  No Growth to date  No Growth to date  No Growth to date  No Growth to date   No Growth to date  No Growth to date     CBC:   Recent Labs   Lab 03/31/20  0530 04/01/20  0610   WBC 8.63 18.22*   HGB 7.5* 8.1*   HCT 24.6* 26.6*   * 693*     CMP:   Recent Labs   Lab 03/31/20  0530  03/31/20  1802 04/01/20  0610 04/01/20  1141   *  126*   < > 127*  127* 123*  123*  123* 125*   K 4.2  4.2   < > 4.2  4.2 4.6  4.6  4.6 4.7   CL 94*  94*   < > 95  95 91*  91*  91* 95   CO2 27  27   < > 25  25 23  23  22* 21*   *  309*   < > 213*  213* 279*  278*  278* 280*   BUN 13  13   < > 12  12 13  13  13 12   CREATININE 0.9  0.9   < > 0.8  0.8 0.8  0.8  0.8 0.8   CALCIUM 8.6*  8.6*   < > 8.2*  8.2* 8.3*  8.3*  8.2* 7.8*   PROT 7.5  --   --  7.5  --    ALBUMIN 1.6*  --   --  1.6*  --    BILITOT 0.4  --   --  0.4  --    ALKPHOS 89  --   --  99  --    AST 13  --   --  15  --    ALT 11  --   --  11  --    ANIONGAP 5*  5*   < > 7*  7* 9  9  10 9   EGFRNONAA >60.0  >60.0   < > >60.0  >60.0 >60.0  >60.0  >60.0 >60.0    < > = values in this interval not displayed.     Respiratory Culture:   Recent Labs   Lab 02/23/20  0200 03/29/20  1843   GSRESP <10 epithelial cells per low power field.  Moderate WBC's  Moderate Gram positive cocci in clusters  Few Gram positive rods <10 epithelial cells per low power field.  Many WBC's  Rare Gram positive rods  Rare Gram negative rods   RESPIRATORYC ESCHERICHIA COLI  Many  *  METHICILLIN RESISTANT STAPHYLOCOCCUS AUREUS  Many  * No Pseudomonas isolated.  METHICILLIN RESISTANT STAPHYLOCOCCUS AUREUS  Few  *  KLEBSIELLA PNEUMONIAE  Rare  Normal respiratory ella also present  *       Significant Imaging: I have reviewed all pertinent imaging results/findings within the past 24 hours.

## 2020-04-02 LAB
ANION GAP SERPL CALC-SCNC: 10 MMOL/L (ref 8–16)
ANION GAP SERPL CALC-SCNC: 9 MMOL/L (ref 8–16)
ANION GAP SERPL CALC-SCNC: 9 MMOL/L (ref 8–16)
BACTERIA BLD CULT: NORMAL
BACTERIA BLD CULT: NORMAL
BASOPHILS # BLD AUTO: 0.05 K/UL (ref 0–0.2)
BASOPHILS NFR BLD: 0.4 % (ref 0–1.9)
BUN SERPL-MCNC: 12 MG/DL (ref 6–20)
BUN SERPL-MCNC: 12 MG/DL (ref 6–20)
BUN SERPL-MCNC: 14 MG/DL (ref 6–20)
CALCIUM SERPL-MCNC: 8.2 MG/DL (ref 8.7–10.5)
CALCIUM SERPL-MCNC: 8.3 MG/DL (ref 8.7–10.5)
CALCIUM SERPL-MCNC: 8.3 MG/DL (ref 8.7–10.5)
CHLORIDE SERPL-SCNC: 95 MMOL/L (ref 95–110)
CO2 SERPL-SCNC: 25 MMOL/L (ref 23–29)
CO2 SERPL-SCNC: 25 MMOL/L (ref 23–29)
CO2 SERPL-SCNC: 26 MMOL/L (ref 23–29)
CREAT SERPL-MCNC: 0.8 MG/DL (ref 0.5–1.4)
CREAT SERPL-MCNC: 0.8 MG/DL (ref 0.5–1.4)
CREAT SERPL-MCNC: 0.9 MG/DL (ref 0.5–1.4)
DIFFERENTIAL METHOD: ABNORMAL
EOSINOPHIL # BLD AUTO: 0.1 K/UL (ref 0–0.5)
EOSINOPHIL NFR BLD: 0.5 % (ref 0–8)
ERYTHROCYTE [DISTWIDTH] IN BLOOD BY AUTOMATED COUNT: 17.1 % (ref 11.5–14.5)
EST. GFR  (AFRICAN AMERICAN): >60 ML/MIN/1.73 M^2
EST. GFR  (NON AFRICAN AMERICAN): >60 ML/MIN/1.73 M^2
GLUCOSE SERPL-MCNC: 239 MG/DL (ref 70–110)
GLUCOSE SERPL-MCNC: 77 MG/DL (ref 70–110)
GLUCOSE SERPL-MCNC: 86 MG/DL (ref 70–110)
HCT VFR BLD AUTO: 25.6 % (ref 40–54)
HGB BLD-MCNC: 7.7 G/DL (ref 14–18)
IMM GRANULOCYTES # BLD AUTO: 0.3 K/UL (ref 0–0.04)
IMM GRANULOCYTES NFR BLD AUTO: 2.3 % (ref 0–0.5)
LYMPHOCYTES # BLD AUTO: 2.3 K/UL (ref 1–4.8)
LYMPHOCYTES NFR BLD: 17.5 % (ref 18–48)
MAGNESIUM SERPL-MCNC: 2 MG/DL (ref 1.6–2.6)
MCH RBC QN AUTO: 25.3 PG (ref 27–31)
MCHC RBC AUTO-ENTMCNC: 30.1 G/DL (ref 32–36)
MCV RBC AUTO: 84 FL (ref 82–98)
MONOCYTES # BLD AUTO: 1.4 K/UL (ref 0.3–1)
MONOCYTES NFR BLD: 10.3 % (ref 4–15)
NEUTROPHILS # BLD AUTO: 9.1 K/UL (ref 1.8–7.7)
NEUTROPHILS NFR BLD: 69 % (ref 38–73)
NRBC BLD-RTO: 0 /100 WBC
PHOSPHATE SERPL-MCNC: 2.7 MG/DL (ref 2.7–4.5)
PLATELET # BLD AUTO: 627 K/UL (ref 150–350)
PMV BLD AUTO: 9.7 FL (ref 9.2–12.9)
POCT GLUCOSE: 179 MG/DL (ref 70–110)
POCT GLUCOSE: 181 MG/DL (ref 70–110)
POCT GLUCOSE: 271 MG/DL (ref 70–110)
POCT GLUCOSE: 81 MG/DL (ref 70–110)
POTASSIUM SERPL-SCNC: 3.9 MMOL/L (ref 3.5–5.1)
POTASSIUM SERPL-SCNC: 4 MMOL/L (ref 3.5–5.1)
POTASSIUM SERPL-SCNC: 4 MMOL/L (ref 3.5–5.1)
RBC # BLD AUTO: 3.04 M/UL (ref 4.6–6.2)
SODIUM SERPL-SCNC: 129 MMOL/L (ref 136–145)
SODIUM SERPL-SCNC: 130 MMOL/L (ref 136–145)
SODIUM SERPL-SCNC: 130 MMOL/L (ref 136–145)
WBC # BLD AUTO: 13.24 K/UL (ref 3.9–12.7)

## 2020-04-02 PROCEDURE — 99233 SBSQ HOSP IP/OBS HIGH 50: CPT | Mod: GC,,, | Performed by: STUDENT IN AN ORGANIZED HEALTH CARE EDUCATION/TRAINING PROGRAM

## 2020-04-02 PROCEDURE — 25000003 PHARM REV CODE 250: Performed by: INTERNAL MEDICINE

## 2020-04-02 PROCEDURE — 84100 ASSAY OF PHOSPHORUS: CPT

## 2020-04-02 PROCEDURE — 63600175 PHARM REV CODE 636 W HCPCS: Performed by: PHYSICIAN ASSISTANT

## 2020-04-02 PROCEDURE — 99233 PR SUBSEQUENT HOSPITAL CARE,LEVL III: ICD-10-PCS | Mod: GC,,, | Performed by: STUDENT IN AN ORGANIZED HEALTH CARE EDUCATION/TRAINING PROGRAM

## 2020-04-02 PROCEDURE — 20600001 HC STEP DOWN PRIVATE ROOM

## 2020-04-02 PROCEDURE — 36415 COLL VENOUS BLD VENIPUNCTURE: CPT

## 2020-04-02 PROCEDURE — 25000003 PHARM REV CODE 250: Performed by: STUDENT IN AN ORGANIZED HEALTH CARE EDUCATION/TRAINING PROGRAM

## 2020-04-02 PROCEDURE — 25000003 PHARM REV CODE 250: Performed by: PHYSICIAN ASSISTANT

## 2020-04-02 PROCEDURE — 63600175 PHARM REV CODE 636 W HCPCS: Performed by: INTERNAL MEDICINE

## 2020-04-02 PROCEDURE — 80048 BASIC METABOLIC PNL TOTAL CA: CPT

## 2020-04-02 PROCEDURE — 80048 BASIC METABOLIC PNL TOTAL CA: CPT | Mod: 91

## 2020-04-02 PROCEDURE — 63600175 PHARM REV CODE 636 W HCPCS: Performed by: STUDENT IN AN ORGANIZED HEALTH CARE EDUCATION/TRAINING PROGRAM

## 2020-04-02 PROCEDURE — 83735 ASSAY OF MAGNESIUM: CPT

## 2020-04-02 PROCEDURE — 85025 COMPLETE CBC W/AUTO DIFF WBC: CPT

## 2020-04-02 RX ORDER — INSULIN LISPRO 100 [IU]/ML
3 INJECTION, SOLUTION INTRAVENOUS; SUBCUTANEOUS
Qty: 10 ML | Refills: 2 | Status: SHIPPED | OUTPATIENT
Start: 2020-04-02 | End: 2020-04-22 | Stop reason: SDUPTHER

## 2020-04-02 RX ORDER — CEFPODOXIME PROXETIL 200 MG/1
200 TABLET, FILM COATED ORAL EVERY 12 HOURS
Qty: 46 TABLET | Refills: 0 | Status: SHIPPED | OUTPATIENT
Start: 2020-04-02 | End: 2020-04-22 | Stop reason: HOSPADM

## 2020-04-02 RX ORDER — DEXTROSE 4 G
TABLET,CHEWABLE ORAL
Qty: 1 EACH | Refills: 0 | Status: SHIPPED | OUTPATIENT
Start: 2020-04-02 | End: 2021-09-16 | Stop reason: SDUPTHER

## 2020-04-02 RX ORDER — VANCOMYCIN HCL IN 5 % DEXTROSE 1G/250ML
1000 PLASTIC BAG, INJECTION (ML) INTRAVENOUS EVERY 12 HOURS
Start: 2020-04-02 | End: 2020-04-22 | Stop reason: HOSPADM

## 2020-04-02 RX ORDER — LANCETS
EACH MISCELLANEOUS
Qty: 200 EACH | Refills: 0 | Status: SHIPPED | OUTPATIENT
Start: 2020-04-02 | End: 2021-09-16 | Stop reason: SDUPTHER

## 2020-04-02 RX ORDER — INSULIN GLARGINE 100 [IU]/ML
10 INJECTION, SOLUTION SUBCUTANEOUS 2 TIMES DAILY
Qty: 9 ML | Refills: 0
Start: 2020-04-02 | End: 2020-04-22

## 2020-04-02 RX ADMIN — INSULIN ASPART 3 UNITS: 100 INJECTION, SOLUTION INTRAVENOUS; SUBCUTANEOUS at 11:04

## 2020-04-02 RX ADMIN — OXYCODONE HYDROCHLORIDE 10 MG: 10 TABLET ORAL at 01:04

## 2020-04-02 RX ADMIN — OXYCODONE HYDROCHLORIDE 10 MG: 10 TABLET ORAL at 05:04

## 2020-04-02 RX ADMIN — VANCOMYCIN HYDROCHLORIDE 1000 MG: 1 INJECTION, POWDER, LYOPHILIZED, FOR SOLUTION INTRAVENOUS at 01:04

## 2020-04-02 RX ADMIN — INSULIN ASPART 3 UNITS: 100 INJECTION, SOLUTION INTRAVENOUS; SUBCUTANEOUS at 04:04

## 2020-04-02 RX ADMIN — ACETAMINOPHEN 650 MG: 325 TABLET ORAL at 05:04

## 2020-04-02 RX ADMIN — Medication 6 MG: at 08:04

## 2020-04-02 RX ADMIN — ENOXAPARIN SODIUM 40 MG: 100 INJECTION SUBCUTANEOUS at 08:04

## 2020-04-02 RX ADMIN — OXYCODONE HYDROCHLORIDE 10 MG: 10 TABLET ORAL at 08:04

## 2020-04-02 RX ADMIN — ATORVASTATIN CALCIUM 20 MG: 20 TABLET, FILM COATED ORAL at 08:04

## 2020-04-02 RX ADMIN — INSULIN DETEMIR 9 UNITS: 100 INJECTION, SOLUTION SUBCUTANEOUS at 08:04

## 2020-04-02 NOTE — PLAN OF CARE
Plan of care updated with patient. No falls during shift. No skin breakdown. Maintained fall protocol. Pt receiving IV Vancomycin Q12H. COVID-19 not detected. Chest tube removed today. Boost added to nutrition regimen. Pt ambulatory - requires one person/standby assist Addressed all issues throughout shift.

## 2020-04-02 NOTE — PROGRESS NOTES
"Pulmonary Progress Note    Cali Mabry  1965   Admitted: 3/26/2020    CC: Empyema    Interval: Total of 240 cc out from CT last 24 hours including volume from TPA/DNAase. Good tidal. No airleak on suction.    ROS: back pain, no fever, + cough, no significant dyspnea    /60   Pulse 88   Temp 97.4 °F (36.3 °C)   Resp 18   Ht 5' 8" (1.727 m)   Wt 59.5 kg (131 lb 2.8 oz)   SpO2 96%   BMI 19.94 kg/m²     Exam:    Gen: chronically ill and malnourished appearing middle aged man  HEENT: No JVD, No mass, MM moist  CV: Regular rate  Resp: Equal respirations, right sided chest tube in place  Abd: not examined  Extremities: warm and well perfused     Labs and imaging reviewed    Assessment and Plan    Multifocal cavitary pneumonia with empyema - lactobacillus, kleb, MRSA   Sepsis with bacteremia - possible TV endocarditis    - Chest tube to be removed today  - Excellent clinical response s/p 6 doses TPA and DNAase  - Antibiotic plan per ID  - No need from our perspective for repeat CT imaging at this time    Will sign off at this time. Discussed with staff    Chetan Agustin MD  [employer] Pulmonary and Critical Care Fellow  Pager: [phone]  Cell: [phone]              "

## 2020-04-02 NOTE — PLAN OF CARE
Called [facility] to try to transfer patient back to [facility]. They are at capacity and unable to take patient at this time. Also found out that since patient was sent from ED, they are under no obligation to take the patient back.    Spoke with Franci at [facility] and confirmed that they will not take active IVDU with PICC line.    Notified care team of above.    Julie Haase RN  Case Management [phone]

## 2020-04-02 NOTE — PROGRESS NOTES
"MD Marquez notified of K:5.5. Pt  AAO, VSS, asymptomatic and NSR on tele. BMP scheduled for 0000 and EKG ordered to confirm rhythm. Will continue to monitor.     Pt complains of 8/10 "stabbing" pain to R chest. Pt asking for IV pain meds. Team trying to use PO. Tylenol and oxycodone given, education provided, and Pt repositioned. Will continue to monitor.   "

## 2020-04-02 NOTE — ASSESSMENT & PLAN NOTE
53 yo male with IVDU with Hep C, hx of MRSA (V sens), Pancreatitis, DM2, who [place] a [place] [facility] [place], CT + multifocal apical cavitations and opacities, assoc. w/ air +fluid, suspected due to MRSA pneumonia c/b parapneumonic empyema and possible septic embolization. covid -, s/d 3/28   Chest tube placed 3/30 qhs on TPA x 6 doses (2/6 doses on 3/30 and 3/29). CTS no intervention for now. TTE negative.     Plan:  - Recent Blood cultures remain negative, WBC count now coming down.   - Continue TB rule out, pending AFB samples  - Follow up with ID. Continue broad spectrum therapy, on vanc and ceftriaxone, with plans to discharge with IV vanc + cefpedoxime for 4-6 weeks total. If Repeat imaging in 4 weeks. May require doxycyline following re imaging if worsening.     - CTS consulted, unlikely to perform intervention, has had good output and decrease on O2 requirements  - TPA per pulmonology, received 5/5 doses with good output in chest tube and improvement in respiratory status. Will pull chest tube today pulmonary team is able.

## 2020-04-02 NOTE — PROGRESS NOTES
patient states he is unable to produce any sputum for samples. Pt states if he does he will call RN

## 2020-04-02 NOTE — ASSESSMENT & PLAN NOTE
Improved with blood sugar control and fluid hydration  component of decreased effective art volume    Continue trend

## 2020-04-02 NOTE — PLAN OF CARE
Pt AAO and VSS. Pt receiving Vanc and ceftriazone IV abx. Pt with CT draining to R side. Pt complains of pain and PRN meds given, K was elevated at 5.5 but midnight K was 4.0. Pt with 180mL output from CT overnight.  Pt educated on fall risk overnight,pt remained free from falls/trauma/injury. Denies chest pain, SOB, palpitations, dizziness, pain, or discomfort. Plan of care reviewed with pt, all questions answered. Bed locked in lowest position, call bell within reach, no acute distress noted, will continue to monitor.

## 2020-04-02 NOTE — SUBJECTIVE & OBJECTIVE
Interval History: 04/02/2020 NAEON. No episodes of hemoptysis. 5/5 TPA doses given with good output. He denies chest pain. No SOB. No fevers.     Review of Systems   Constitutional: Positive for fatigue. Negative for fever.   Respiratory: Negative for cough (Denies) and shortness of breath.    Cardiovascular: Positive for chest pain (Pleuritic). Negative for leg swelling.   Gastrointestinal: Negative for abdominal pain, diarrhea, nausea and vomiting.   Genitourinary: Negative for dysuria.     Objective:     Vital Signs (Most Recent):  Temp: 98.2 °F (36.8 °C) (04/02/20 0800)  Pulse: 92 (04/02/20 0800)  Resp: 18 (04/02/20 0800)  BP: 110/63 (04/02/20 0800)  SpO2: 98 % (04/02/20 0800) Vital Signs (24h Range):  Temp:  [97.8 °F (36.6 °C)-98.8 °F (37.1 °C)] 98.2 °F (36.8 °C)  Pulse:  [] 92  Resp:  [14-18] 18  SpO2:  [95 %-99 %] 98 %  BP: (102-132)/(56-68) 110/63     Weight: 59.5 kg (131 lb 2.8 oz)  Body mass index is 19.94 kg/m².    Intake/Output Summary (Last 24 hours) at 4/2/2020 1030  Last data filed at 4/2/2020 0900  Gross per 24 hour   Intake 720 ml   Output 1830 ml   Net -1110 ml      Physical Exam   Constitutional: He is oriented to person, place, and time. He appears well-developed. No distress.   Thin   HENT:   Head: Normocephalic and atraumatic.   Eyes: Pupils are equal, round, and reactive to light. EOM are normal.   Neck: Normal range of motion. No tracheal deviation present. No thyromegaly present.   Cardiovascular: Normal rate and normal heart sounds.   No murmur heard.  Tachycardia   Pulmonary/Chest: No respiratory distress. He has rales.   Abdominal: Bowel sounds are normal. He exhibits no distension.   Musculoskeletal: Normal range of motion. He exhibits no edema.   Neurological: He is alert and oriented to person, place, and time. No cranial nerve deficit.   Skin: He is not diaphoretic.   Nursing note and vitals reviewed.      Significant Labs:   BMP:   Recent Labs   Lab 04/02/20  0518  04/02/20  0852   GLU 86 77   * 129*   K 4.0 4.0   CL 95 95   CO2 26 25   BUN 12 12   CREATININE 0.8 0.8   CALCIUM 8.2* 8.3*   MG 2.0  --      CBC:   Recent Labs   Lab 04/01/20  0610 04/02/20  0518   WBC 18.22* 13.24*   HGB 8.1* 7.7*   HCT 26.6* 25.6*   * 627*       Significant Imaging: I have reviewed and interpreted all pertinent imaging results/findings within the past 24 hours.

## 2020-04-03 LAB
ABO + RH BLD: NORMAL
ANION GAP SERPL CALC-SCNC: 10 MMOL/L (ref 8–16)
ANION GAP SERPL CALC-SCNC: 10 MMOL/L (ref 8–16)
ANISOCYTOSIS BLD QL SMEAR: SLIGHT
BASO STIPL BLD QL SMEAR: ABNORMAL
BASOPHILS # BLD AUTO: 0.04 K/UL (ref 0–0.2)
BASOPHILS NFR BLD: 0.4 % (ref 0–1.9)
BASOPHILS NFR BLD: 1 % (ref 0–1.9)
BLD GP AB SCN CELLS X3 SERPL QL: NORMAL
BUN SERPL-MCNC: 15 MG/DL (ref 6–20)
BUN SERPL-MCNC: 17 MG/DL (ref 6–20)
CALCIUM SERPL-MCNC: 7.6 MG/DL (ref 8.7–10.5)
CALCIUM SERPL-MCNC: 8 MG/DL (ref 8.7–10.5)
CHLORIDE SERPL-SCNC: 96 MMOL/L (ref 95–110)
CHLORIDE SERPL-SCNC: 97 MMOL/L (ref 95–110)
CO2 SERPL-SCNC: 24 MMOL/L (ref 23–29)
CO2 SERPL-SCNC: 25 MMOL/L (ref 23–29)
CREAT SERPL-MCNC: 0.9 MG/DL (ref 0.5–1.4)
CREAT SERPL-MCNC: 1.1 MG/DL (ref 0.5–1.4)
DIFFERENTIAL METHOD: ABNORMAL
DIFFERENTIAL METHOD: ABNORMAL
EOSINOPHIL # BLD AUTO: 0.2 K/UL (ref 0–0.5)
EOSINOPHIL NFR BLD: 1.5 % (ref 0–8)
EOSINOPHIL NFR BLD: 4 % (ref 0–8)
ERYTHROCYTE [DISTWIDTH] IN BLOOD BY AUTOMATED COUNT: 17.2 % (ref 11.5–14.5)
ERYTHROCYTE [DISTWIDTH] IN BLOOD BY AUTOMATED COUNT: 17.4 % (ref 11.5–14.5)
EST. GFR  (AFRICAN AMERICAN): >60 ML/MIN/1.73 M^2
EST. GFR  (AFRICAN AMERICAN): >60 ML/MIN/1.73 M^2
EST. GFR  (NON AFRICAN AMERICAN): >60 ML/MIN/1.73 M^2
EST. GFR  (NON AFRICAN AMERICAN): >60 ML/MIN/1.73 M^2
FERRITIN SERPL-MCNC: 418 NG/ML (ref 20–300)
GIANT PLATELETS BLD QL SMEAR: PRESENT
GLUCOSE SERPL-MCNC: 124 MG/DL (ref 70–110)
GLUCOSE SERPL-MCNC: 295 MG/DL (ref 70–110)
HCT VFR BLD AUTO: 23.9 % (ref 40–54)
HCT VFR BLD AUTO: 24 % (ref 40–54)
HGB BLD-MCNC: 7 G/DL (ref 14–18)
HGB BLD-MCNC: 7.1 G/DL (ref 14–18)
IMM GRANULOCYTES # BLD AUTO: 0.5 K/UL (ref 0–0.04)
IMM GRANULOCYTES # BLD AUTO: ABNORMAL K/UL (ref 0–0.04)
IMM GRANULOCYTES NFR BLD AUTO: 4.8 % (ref 0–0.5)
IMM GRANULOCYTES NFR BLD AUTO: ABNORMAL % (ref 0–0.5)
IRON SERPL-MCNC: 20 UG/DL (ref 45–160)
LYMPHOCYTES # BLD AUTO: 2.3 K/UL (ref 1–4.8)
LYMPHOCYTES NFR BLD: 15 % (ref 18–48)
LYMPHOCYTES NFR BLD: 22.3 % (ref 18–48)
MCH RBC QN AUTO: 24.8 PG (ref 27–31)
MCH RBC QN AUTO: 25.2 PG (ref 27–31)
MCHC RBC AUTO-ENTMCNC: 29.3 G/DL (ref 32–36)
MCHC RBC AUTO-ENTMCNC: 29.6 G/DL (ref 32–36)
MCV RBC AUTO: 85 FL (ref 82–98)
MCV RBC AUTO: 85 FL (ref 82–98)
MONOCYTES # BLD AUTO: 1 K/UL (ref 0.3–1)
MONOCYTES NFR BLD: 4 % (ref 4–15)
MONOCYTES NFR BLD: 9.3 % (ref 4–15)
NEUTROPHILS # BLD AUTO: 6.4 K/UL (ref 1.8–7.7)
NEUTROPHILS NFR BLD: 61.7 % (ref 38–73)
NEUTROPHILS NFR BLD: 76 % (ref 38–73)
NRBC BLD-RTO: 0 /100 WBC
NRBC BLD-RTO: 0 /100 WBC
PLATELET # BLD AUTO: 674 K/UL (ref 150–350)
PLATELET # BLD AUTO: 701 K/UL (ref 150–350)
PLATELET BLD QL SMEAR: ABNORMAL
PMV BLD AUTO: 9.2 FL (ref 9.2–12.9)
PMV BLD AUTO: 9.3 FL (ref 9.2–12.9)
POCT GLUCOSE: 157 MG/DL (ref 70–110)
POCT GLUCOSE: 243 MG/DL (ref 70–110)
POCT GLUCOSE: 364 MG/DL (ref 70–110)
POCT GLUCOSE: 453 MG/DL (ref 70–110)
POLYCHROMASIA BLD QL SMEAR: ABNORMAL
POTASSIUM SERPL-SCNC: 3.7 MMOL/L (ref 3.5–5.1)
POTASSIUM SERPL-SCNC: 4 MMOL/L (ref 3.5–5.1)
RBC # BLD AUTO: 2.82 M/UL (ref 4.6–6.2)
RBC # BLD AUTO: 2.82 M/UL (ref 4.6–6.2)
RETICS/RBC NFR AUTO: 2.4 % (ref 0.4–2)
SATURATED IRON: 9 % (ref 20–50)
SODIUM SERPL-SCNC: 131 MMOL/L (ref 136–145)
SODIUM SERPL-SCNC: 131 MMOL/L (ref 136–145)
TOTAL IRON BINDING CAPACITY: 222 UG/DL (ref 250–450)
TOXIC GRANULES BLD QL SMEAR: PRESENT
TRANSFERRIN SERPL-MCNC: 150 MG/DL (ref 200–375)
VANCOMYCIN TROUGH SERPL-MCNC: 24.3 UG/ML (ref 10–22)
WBC # BLD AUTO: 10.32 K/UL (ref 3.9–12.7)
WBC # BLD AUTO: 9.51 K/UL (ref 3.9–12.7)

## 2020-04-03 PROCEDURE — 63600175 PHARM REV CODE 636 W HCPCS: Performed by: STUDENT IN AN ORGANIZED HEALTH CARE EDUCATION/TRAINING PROGRAM

## 2020-04-03 PROCEDURE — 80202 ASSAY OF VANCOMYCIN: CPT

## 2020-04-03 PROCEDURE — C9399 UNCLASSIFIED DRUGS OR BIOLOG: HCPCS | Performed by: STUDENT IN AN ORGANIZED HEALTH CARE EDUCATION/TRAINING PROGRAM

## 2020-04-03 PROCEDURE — 99233 SBSQ HOSP IP/OBS HIGH 50: CPT | Mod: GC,,, | Performed by: STUDENT IN AN ORGANIZED HEALTH CARE EDUCATION/TRAINING PROGRAM

## 2020-04-03 PROCEDURE — 25000003 PHARM REV CODE 250: Performed by: PHYSICIAN ASSISTANT

## 2020-04-03 PROCEDURE — 85007 BL SMEAR W/DIFF WBC COUNT: CPT

## 2020-04-03 PROCEDURE — 85027 COMPLETE CBC AUTOMATED: CPT

## 2020-04-03 PROCEDURE — 25000003 PHARM REV CODE 250: Performed by: STUDENT IN AN ORGANIZED HEALTH CARE EDUCATION/TRAINING PROGRAM

## 2020-04-03 PROCEDURE — 80048 BASIC METABOLIC PNL TOTAL CA: CPT

## 2020-04-03 PROCEDURE — 36415 COLL VENOUS BLD VENIPUNCTURE: CPT

## 2020-04-03 PROCEDURE — 63600175 PHARM REV CODE 636 W HCPCS: Performed by: INTERNAL MEDICINE

## 2020-04-03 PROCEDURE — 82728 ASSAY OF FERRITIN: CPT

## 2020-04-03 PROCEDURE — 85045 AUTOMATED RETICULOCYTE COUNT: CPT

## 2020-04-03 PROCEDURE — 85025 COMPLETE CBC W/AUTO DIFF WBC: CPT

## 2020-04-03 PROCEDURE — 86850 RBC ANTIBODY SCREEN: CPT

## 2020-04-03 PROCEDURE — 99233 PR SUBSEQUENT HOSPITAL CARE,LEVL III: ICD-10-PCS | Mod: GC,,, | Performed by: STUDENT IN AN ORGANIZED HEALTH CARE EDUCATION/TRAINING PROGRAM

## 2020-04-03 PROCEDURE — 25000003 PHARM REV CODE 250: Performed by: INTERNAL MEDICINE

## 2020-04-03 PROCEDURE — 97803 MED NUTRITION INDIV SUBSEQ: CPT

## 2020-04-03 PROCEDURE — 20600001 HC STEP DOWN PRIVATE ROOM

## 2020-04-03 PROCEDURE — 86920 COMPATIBILITY TEST SPIN: CPT

## 2020-04-03 PROCEDURE — 83540 ASSAY OF IRON: CPT

## 2020-04-03 RX ADMIN — OXYCODONE HYDROCHLORIDE 10 MG: 10 TABLET ORAL at 06:04

## 2020-04-03 RX ADMIN — Medication 6 MG: at 10:04

## 2020-04-03 RX ADMIN — VANCOMYCIN HYDROCHLORIDE 1000 MG: 1 INJECTION, POWDER, LYOPHILIZED, FOR SOLUTION INTRAVENOUS at 12:04

## 2020-04-03 RX ADMIN — PIPERACILLIN AND TAZOBACTAM 4.5 G: 4; .5 INJECTION, POWDER, LYOPHILIZED, FOR SOLUTION INTRAVENOUS; PARENTERAL at 04:04

## 2020-04-03 RX ADMIN — CEFTRIAXONE 2 G: 2 INJECTION, SOLUTION INTRAVENOUS at 12:04

## 2020-04-03 RX ADMIN — ONDANSETRON 8 MG: 8 TABLET, ORALLY DISINTEGRATING ORAL at 09:04

## 2020-04-03 RX ADMIN — OXYCODONE HYDROCHLORIDE 10 MG: 10 TABLET ORAL at 05:04

## 2020-04-03 RX ADMIN — INSULIN ASPART 5 UNITS: 100 INJECTION, SOLUTION INTRAVENOUS; SUBCUTANEOUS at 04:04

## 2020-04-03 RX ADMIN — INSULIN DETEMIR 9 UNITS: 100 INJECTION, SOLUTION SUBCUTANEOUS at 08:04

## 2020-04-03 RX ADMIN — Medication 1 TABLET: at 09:04

## 2020-04-03 RX ADMIN — INSULIN DETEMIR 9 UNITS: 100 INJECTION, SOLUTION SUBCUTANEOUS at 12:04

## 2020-04-03 RX ADMIN — OXYCODONE HYDROCHLORIDE 10 MG: 10 TABLET ORAL at 02:04

## 2020-04-03 RX ADMIN — INSULIN ASPART 3 UNITS: 100 INJECTION, SOLUTION INTRAVENOUS; SUBCUTANEOUS at 11:04

## 2020-04-03 RX ADMIN — INSULIN ASPART 3 UNITS: 100 INJECTION, SOLUTION INTRAVENOUS; SUBCUTANEOUS at 04:04

## 2020-04-03 RX ADMIN — ATORVASTATIN CALCIUM 20 MG: 20 TABLET, FILM COATED ORAL at 09:04

## 2020-04-03 RX ADMIN — OXYCODONE HYDROCHLORIDE 10 MG: 10 TABLET ORAL at 09:04

## 2020-04-03 RX ADMIN — INSULIN ASPART 5 UNITS: 100 INJECTION, SOLUTION INTRAVENOUS; SUBCUTANEOUS at 11:04

## 2020-04-03 RX ADMIN — VANCOMYCIN HYDROCHLORIDE 1000 MG: 1 INJECTION, POWDER, LYOPHILIZED, FOR SOLUTION INTRAVENOUS at 02:04

## 2020-04-03 RX ADMIN — INSULIN ASPART 3 UNITS: 100 INJECTION, SOLUTION INTRAVENOUS; SUBCUTANEOUS at 08:04

## 2020-04-03 RX ADMIN — OXYCODONE HYDROCHLORIDE 10 MG: 10 TABLET ORAL at 10:04

## 2020-04-03 RX ADMIN — INSULIN ASPART 1 UNITS: 100 INJECTION, SOLUTION INTRAVENOUS; SUBCUTANEOUS at 08:04

## 2020-04-03 RX ADMIN — INSULIN DETEMIR 9 UNITS: 100 INJECTION, SOLUTION SUBCUTANEOUS at 09:04

## 2020-04-03 NOTE — PROGRESS NOTES
"[facility] Hospital Medicine  Progress Note    Patient Name: Cali Mabry  MRN: [id number]  Patient Class: IP- Inpatient   Admission Date: 3/26/2020  Length of Stay: 7 days  Attending Physician: Jered Diehl MD  Primary Care Provider: Primary Doctor No    Hospital Medicine Team: [facility] [place] [place] HOSP MED 1 Matthew Blackwell MD    Subjective:     Principal Problem:Acute respiratory failure with hypoxia        HPI:  Cali Mabry is a 54 y.o. with DM II, hepatitis C, HTN, polysubstance abuse/IVDU, pancreatitis who presents, after transfer from [facility], with a 2 day history of chest pain and shortness of breath.  Chest pain is centralized and just described as "painful." Pain aggravated with movement and deep breathing. He reports an intermittent cough that is non-productive. He denies sick contacts, fever, chills, sore throat. Upon questioning, patient stated "I have the virus so just treat me for it." Educated patient that testing is still in process. Inquired about IV drug use however he was unable to recall when he last used. Patient swatted examiner's hand after chest palpation, yelling "that hurts!" Patient became aggravated, reported his pain was now worse and requested pain medication. Per nurse, patient given PRN dose an hour prior. Patient encouraged to continue discussing his plan of care however stated, "I don't want to chat."   ED: Afebrile with leukocytosis. CTA chest from 3/26 with dense consolidative changes within the right lower lobe with internal cavitation containing air and fluid; Occlusion is seen of the right lower lobe bronchi which may be secondary to mucoid impaction or aspirated material; Additional multifocal opacities and cavitations.  Vancomycin and Zosyn administered. On 2L NC. VSS. Admitted to hospital medicine for further evaluation and COVID-19 rule out.        Overview/Hospital Course:  55 yo male with IVDU with Hep C, hx of MRSA (V sens), Pancreatitis, " DM2, who [facility], CT + multifocal apical cavitations and opacities, assoc. w/ air +fluid, suspected due to MRSA pneumonia c/b parapneumonic empyema and possible septic embolization. covid -, s/d 3/28   Chest tube placed 3/30 qhs on TPA x 6 doses (2/6 doses on 3/30 and 3/29). CTS no intervention for now. TTE negative.     04/01/2020 No acute events overnight. There was some concern with hemoptysis, however on discussion with RN and patient, there was no witnessed hemoptysis or active bleed or clots. There was a pink fluid visible in a container, and source is not clear. Patient received extra dose of 0.5 dilaudid x 2 on 3/31 and 4/1/20. He denies any difficulties breathing. He is resting comfortably in bed, watching tv, pain appears controlled.     04/02/2020 NAEON. No episodes of hemoptysis. 5/5 TPA doses given with good output. He denies chest pain. No SOB. No fevers.   04/03/2020 NAEON. No chest pain. No SOB. Eating well.     Interval History: 04/03/2020 NAEON. No chest pain. No SOB. Eating well.     Review of Systems   Constitutional: Positive for fatigue. Negative for fever.   Respiratory: Negative for cough (Denies) and shortness of breath.    Cardiovascular: Negative for chest pain (Pleuritic) and leg swelling.   Gastrointestinal: Negative for abdominal pain, diarrhea, nausea and vomiting.   Genitourinary: Negative for dysuria.     Objective:     Vital Signs (Most Recent):  Temp: 98.5 °F (36.9 °C) (04/03/20 1137)  Pulse: 100 (04/03/20 1137)  Resp: 16 (04/03/20 1137)  BP: (!) 97/54 (04/03/20 1137)  SpO2: (!) 91 % (04/03/20 1137) Vital Signs (24h Range):  Temp:  [97.8 °F (36.6 °C)-98.8 °F (37.1 °C)] 98.5 °F (36.9 °C)  Pulse:  [] 100  Resp:  [15-18] 16  SpO2:  [91 %-99 %] 91 %  BP: ()/(50-69) 97/54     Weight: 59.5 kg (131 lb 2.8 oz)  Body mass index is 19.94 kg/m².    Intake/Output Summary (Last 24 hours) at 4/3/2020 1500  Last data filed at 4/3/2020  1300  Gross per 24 hour   Intake 240 ml   Output 1050 ml   Net -810 ml      Physical Exam   Constitutional: He is oriented to person, place, and time. He appears well-developed. No distress.   Thin   HENT:   Head: Normocephalic and atraumatic.   Eyes: Pupils are equal, round, and reactive to light. EOM are normal.   Neck: Normal range of motion. No tracheal deviation present. No thyromegaly present.   Cardiovascular: Normal rate and normal heart sounds.   No murmur heard.  Pulmonary/Chest: Effort normal and breath sounds normal. No respiratory distress. He has no rales.   Abdominal: Bowel sounds are normal. He exhibits no distension.   Musculoskeletal: Normal range of motion. He exhibits no edema.   Neurological: He is alert and oriented to person, place, and time. No cranial nerve deficit.   Skin: He is not diaphoretic.   Nursing note and vitals reviewed.      Significant Labs:   CBC:   Recent Labs   Lab 04/02/20  0518 04/03/20  0521 04/03/20  1319   WBC 13.24* 10.32 9.51   HGB 7.7* 7.0* 7.1*   HCT 25.6* 23.9* 24.0*   * 701* 674*     CMP:   Recent Labs   Lab 04/02/20  0852 04/02/20  1729 04/03/20  0748   * 130* 131*   K 4.0 3.9 3.7   CL 95 95 97   CO2 25 25 24   GLU 77 239* 124*   BUN 12 14 15   CREATININE 0.8 0.9 0.9   CALCIUM 8.3* 8.3* 8.0*   ANIONGAP 9 10 10   EGFRNONAA >60.0 >60.0 >60.0       Significant Imaging: I have reviewed and interpreted all pertinent imaging results/findings within the past 24 hours.      Assessment/Plan:      * Acute respiratory failure with hypoxia  55 yo male with IVDU with Hep C, hx of MRSA (V sens), Pancreatitis, DM2, who presents as a transfer from [facility] for SOB and CP, CT + multifocal apical cavitations and opacities, assoc. w/ air +fluid, suspected due to MRSA pneumonia c/b parapneumonic empyema and possible septic embolization. covid -, s/d 3/28   Chest tube placed 3/30 qhs on TPA x 6 doses (2/6 doses on 3/30 and 3/29). CTS no intervention for now. TTE  negative.     Plan:  - Recent Blood cultures remain negative, WBC count now coming down.   - Continue TB rule out, pending AFB samples  - Follow up with ID. Continue broad spectrum therapy, on vanc and zosyn. STOP ceftriaxone on 4/3, per ID, lactobacillus in pleural fluid can be resistant, also with E Coli/Klebsiella.  -plans to discharge with IV vanc + cefpedoxime or augmentin for 4-6 weeks total. If Repeat imaging in 4 weeks. May require doxycyline following re imaging if worsening.   - If patient leaves AMA, can offer linezolid, but would be suboptimal treatment.     - CTS consulted, unlikely to perform intervention, has had good output and decrease on O2 requirements  - TPA per pulmonology, received 5/5 doses with good output in chest tube and improvement in respiratory status. Will pull chest tube today pulmonary team is able.             Septic embolism  TTE negative  Neuro checks q4h.       Parapneumonic effusion  See above       Hyponatremia  Improved with blood sugar control and fluid hydration  component of decreased effective art volume    Continue trend     Chest pain  Improved     -oxyCODONE immediate release [id number] mg as patient his pain is no controlled PRN, moderate pain  - should get better with draining of fluid    Tobacco abuse        Chronic hepatitis C        Anemia of chronic disease        Intravenous drug abuse    -counseled on cessation      Hyperlipidemia  - continue statin       Essential hypertension  Stable, not on meds       Type 2 diabetes mellitus, uncontrolled  -HbA1c 11  - Insulin adjusted 9 long BID, 3 short TID          Opioid dependence, continuous          VTE Risk Mitigation (From admission, onward)         Ordered     IP VTE HIGH RISK PATIENT  Once      03/27/20 8991                      Matthew Blackwell MD  Department of Hospital Medicine   [facility]

## 2020-04-03 NOTE — PROGRESS NOTES
"[facility]  Adult Nutrition  Progress Note    SUMMARY       Recommendations    1. Continue with a diabetic diet plus Boost Glucose Control TID  2. Add MVI   3. Get Daily wts   4. RD to monitor  Goals: Pt meets 75% of EEN and EPN by RD follow up  Nutrition Goal Status: new  Communication of RD Recs: other (comment)    Reason for Assessment    Reason For Assessment: RD follow-up  Diagnosis: other (see comments)(Acute Respiratory failure/ Empyema)  Relevant Medical History: Dm, HLD, IVDU, Hepatitis, Drug user  Interdisciplinary Rounds: did not attend  General Information Comments: RD working remotely. Unable to reach pt but I did speak with his nurse. He is eating 100% of his meals today and is also drinking his Boost Glucose Control. He did complain about nausea but the Zofran has been helping. He does not have v/d/c. Per the chart, pt had 75% of meals yesterday. Per the dr note, pt is chronically ill and appears malnourished. He does have a recent herion adiction that does affect nutrition. Pt's wt hx shows wt ranges between 57kg-68kg. Need to watch pt's wt and po intake closely. No NFPE was completed today due to Covid19 restrictions. When possible, an NFPE should be completed. RD to continue to monitor.  Nutrition Discharge Planning: Diabetic diet    Nutrition Risk Screen    Nutrition Risk Screen: no indicators present    Nutrition/Diet History    Spiritual, Cultural Beliefs, Temple Practices, Values that Affect Care: no    Anthropometrics    Temp: 98.5 °F (36.9 °C)  Height Method: Stated  Height: 5' 8" (172.7 cm)  Height (inches): 68 in  Weight Method: Bed Scale  Weight: 59.5 kg (131 lb 2.8 oz)  Weight (lb): 131.18 lb  Ideal Body Weight (IBW), Male: 154 lb  % Ideal Body Weight, Male (lb): 87.01 %  BMI (Calculated): 19.9  BMI Grade: 18.5-24.9 - normal       Lab/Procedures/Meds    Pertinent Labs Reviewed: reviewed  Pertinent Labs Comments: Glu 124, Iron 20  Pertinent Medications Reviewed: " reviewed  Pertinent Medications Comments: Insulin, Folic Acid, Statin    Physical Findings/Assessment         Estimated/Assessed Needs    Weight Used For Calorie Calculations: 59.5 kg (131 lb 2.8 oz)  Energy Calorie Requirements (kcal): 1838(1.3 activity factor)  Energy Need Method: Swisher-St Jeor     Weight Used For Protein Calculations: 59.5 kg (131 lb 2.8 oz)     Estimated Fluid Requirement Method: RDA Method  RDA Method (mL): 1838  CHO Requirement: 270      Nutrition Prescription Ordered    Current Diet Order: Diabetic  Nutrition Order Comments: 2000kcal  Oral Nutrition Supplement: Boost Glucose Control TID    Evaluation of Received Nutrient/Fluid Intake    I/O: +356mL since admit  Comments: LBM 3/31  Tolerance: tolerating  % Intake of Estimated Energy Needs: %  % Meal Intake: %    Nutrition Risk    Level of Risk/Frequency of Follow-up: low(1xweek)     Assessment and Plan     No nutritional diagnosis at this time.    Monitor and Evaluation    Food and Nutrient Intake: energy intake  Food and Nutrient Adminstration: diet order  Anthropometric Measurements: weight, weight change, body mass index  Biochemical Data, Medical Tests and Procedures: electrolyte and renal panel, gastrointestinal profile, glucose/endocrine profile, inflammatory profile, lipid profile  Nutrition-Focused Physical Findings: overall appearance     Malnutrition Assessment                                       Nutrition Follow-Up    RD Follow-up?: Yes

## 2020-04-03 NOTE — PLAN OF CARE
04/03/20 1256   Post-Acute Status   Post-Acute Authorization Placement   Post-Acute Placement Status Referrals Sent     LINN was informed by CM and medical team that pt will need 6 weeks of IVAB once he is ready to discharge; however, due to him being a IVDU he does not have an agency willing to accept him.     SW sent a referral to [facility] via  for consideration of placement for pt.    Vida Carney LMSW  [facility]  Ext. [phone]

## 2020-04-03 NOTE — SUBJECTIVE & OBJECTIVE
Interval History: 04/03/2020 NAEON. No chest pain. No SOB. Eating well.     Review of Systems   Constitutional: Positive for fatigue. Negative for fever.   Respiratory: Negative for cough (Denies) and shortness of breath.    Cardiovascular: Negative for chest pain (Pleuritic) and leg swelling.   Gastrointestinal: Negative for abdominal pain, diarrhea, nausea and vomiting.   Genitourinary: Negative for dysuria.     Objective:     Vital Signs (Most Recent):  Temp: 98.5 °F (36.9 °C) (04/03/20 1137)  Pulse: 100 (04/03/20 1137)  Resp: 16 (04/03/20 1137)  BP: (!) 97/54 (04/03/20 1137)  SpO2: (!) 91 % (04/03/20 1137) Vital Signs (24h Range):  Temp:  [97.8 °F (36.6 °C)-98.8 °F (37.1 °C)] 98.5 °F (36.9 °C)  Pulse:  [] 100  Resp:  [15-18] 16  SpO2:  [91 %-99 %] 91 %  BP: ()/(50-69) 97/54     Weight: 59.5 kg (131 lb 2.8 oz)  Body mass index is 19.94 kg/m².    Intake/Output Summary (Last 24 hours) at 4/3/2020 1500  Last data filed at 4/3/2020 1300  Gross per 24 hour   Intake 240 ml   Output 1050 ml   Net -810 ml      Physical Exam   Constitutional: He is oriented to person, place, and time. He appears well-developed. No distress.   Thin   HENT:   Head: Normocephalic and atraumatic.   Eyes: Pupils are equal, round, and reactive to light. EOM are normal.   Neck: Normal range of motion. No tracheal deviation present. No thyromegaly present.   Cardiovascular: Normal rate and normal heart sounds.   No murmur heard.  Pulmonary/Chest: Effort normal and breath sounds normal. No respiratory distress. He has no rales.   Abdominal: Bowel sounds are normal. He exhibits no distension.   Musculoskeletal: Normal range of motion. He exhibits no edema.   Neurological: He is alert and oriented to person, place, and time. No cranial nerve deficit.   Skin: He is not diaphoretic.   Nursing note and vitals reviewed.      Significant Labs:   CBC:   Recent Labs   Lab 04/02/20  0518 04/03/20  0521 04/03/20  1319   WBC 13.24* 10.32 9.51    HGB 7.7* 7.0* 7.1*   HCT 25.6* 23.9* 24.0*   * 701* 674*     CMP:   Recent Labs   Lab 04/02/20  0852 04/02/20  1729 04/03/20  0748   * 130* 131*   K 4.0 3.9 3.7   CL 95 95 97   CO2 25 25 24   GLU 77 239* 124*   BUN 12 14 15   CREATININE 0.8 0.9 0.9   CALCIUM 8.3* 8.3* 8.0*   ANIONGAP 9 10 10   EGFRNONAA >60.0 >60.0 >60.0       Significant Imaging: I have reviewed and interpreted all pertinent imaging results/findings within the past 24 hours.

## 2020-04-03 NOTE — PLAN OF CARE
Recommendations     1. Continue with a diabetic diet plus Boost Glucose Control TID  2. Add MVI   3. Get Daily wts   4. RD to monitor  Goals: Pt meets 75% of EEN and EPN by RD follow up  Nutrition Goal Status: new

## 2020-04-03 NOTE — NURSING
Pt removed telemetry and refusing to allow RN to put monitor back on. Educated pt on importance of cardiac monitoring. Pt continues to refuse. Will continue to monitor.

## 2020-04-03 NOTE — PROGRESS NOTES
Pharmacokinetic Assessment Follow Up: IV Vancomycin    Vancomycin serum concentration assessment/plan:  · Trough level=24.3 mcg/mL; goal 15-20, septic emboli   · Change Vancomycin to 750 mg IV every 12 hours   · Next serum trough concentration will be measured at 1400 prior to the 4th dose on 4/5    Drug levels (last 3 results):  Recent Labs   Lab Result Units 04/03/20  1319   Vancomycin-Trough ug/mL 24.3*     Pharmacy will continue to follow and monitor vancomycin.    Please contact pharmacy at extension [phone] for questions regarding this assessment.    Thank you for the consult,   Evie Durham     Patient brief summary:  Cali Mabry is a 54 y.o. male initiated on antimicrobial therapy with IV Vancomycin for treatment of Septic Emboli    Drug Allergies:   Review of patient's allergies indicates:  No Known Allergies    Actual Body Weight:   59.5 kg     Renal Function:   Estimated Creatinine Clearance: 79.9 mL/min (based on SCr of 0.9 mg/dL).,     CBC (last 72 hours):  Recent Labs   Lab Result Units 04/01/20  0610 04/02/20  0518 04/03/20  0521 04/03/20  1319   WBC K/uL 18.22* 13.24* 10.32 9.51   Hemoglobin g/dL 8.1* 7.7* 7.0* 7.1*   Hematocrit % 26.6* 25.6* 23.9* 24.0*   Platelets K/uL 693* 627* 701* 674*   Gran% % 85.2* 69.0 61.7 76.0*   Lymph% % 6.0* 17.5* 22.3 15.0*   Mono% % 6.9 10.3 9.3 4.0   Eosinophil% % 0.0 0.5 1.5 4.0   Basophil% % 0.2 0.4 0.4 1.0   Differential Method  Automated Automated Automated Manual       Metabolic Panel (last 72 hours):  Recent Labs   Lab Result Units 03/31/20  1802 04/01/20  0610 04/01/20  1141 04/01/20  1833 04/01/20  2323 04/02/20  0518 04/02/20  0852 04/02/20  1729 04/03/20  0748   Sodium mmol/L 127*  127* 123*  123*  123* 125* 129* 129* 130* 129* 130* 131*   Potassium mmol/L 4.2  4.2 4.6  4.6  4.6 4.7 5.5* 4.0 4.0 4.0 3.9 3.7   Chloride mmol/L 95  95 91*  91*  91* 95 96 95 95 95 95 97   CO2 mmol/L 25  25 23  23  22* 21* 23 25 26 25 25 24   Glucose mg/dL  213*  213* 279*  278*  278* 280* 219* 151* 86 77 239* 124*   BUN, Bld mg/dL 12  12 13  13  13 12 12 12 12 12 14 15   Creatinine mg/dL 0.8  0.8 0.8  0.8  0.8 0.8 0.9 0.8 0.8 0.8 0.9 0.9   Albumin g/dL  --  1.6*  --   --   --   --   --   --   --    Total Bilirubin mg/dL  --  0.4  --   --   --   --   --   --   --    Alkaline Phosphatase U/L  --  99  --   --   --   --   --   --   --    AST U/L  --  15  --   --   --   --   --   --   --    ALT U/L  --  11  --   --   --   --   --   --   --    Magnesium mg/dL  --  1.7  --   --   --  2.0  --   --   --    Phosphorus mg/dL  --  2.9  --   --   --  2.7  --   --   --        Vancomycin Administrations:  vancomycin given in the last 96 hours                   vancomycin in dextrose 5 % 1 gram/250 mL IVPB 1,000 mg (mg) 1,000 mg New Bag 03/30/20 1221     1,000 mg New Bag  0001     1,000 mg New Bag 03/29/20 1242     1,000 mg New Bag  0100     1,000 mg New Bag 03/28/20 1632                Microbiologic Results:  Microbiology Results (last 7 days)     Procedure Component Value Units Date/Time    Blood culture [[id number]] Collected:  03/29/20 1535    Order Status:  Completed Specimen:  Blood Updated:  04/02/20 1812     Blood Culture, Routine No Growth after 4 days.     Narrative:       Collection has been rescheduled by LW1 at 03/29/2020 12:34 Reason:   Patient Refused wants to be cleaned first before labs are drawn tech   will come back in 30 mins spoke with nurse Sharonda  Collection has been rescheduled by LW1 at 03/29/2020 13:34 Reason:   Unable to collect pt wants tech to come back when he's done eating  Collection has been rescheduled by LW1 at 03/29/2020 12:34 Reason:   Patient Refused wants to be cleaned first before labs are drawn tech   will come back in 30 mins spoke with nurse Sharonda  Collection has been rescheduled by LW1 at 03/29/2020 13:34 Reason:   Unable to collect pt wants tech to come back when he's done eating    Blood culture [[id number]] Collected:   03/29/20 1535    Order Status:  Completed Specimen:  Blood Updated:  04/02/20 1812     Blood Culture, Routine No Growth after 4 days.     Narrative:       Collection has been rescheduled by LW1 at 03/29/2020 12:34 Reason:   Patient Refused wants to be cleaned first before labs are drawn tech   will come back in 30 mins spoke with nurse Sharonda  Collection has been rescheduled by LW1 at 03/29/2020 13:34 Reason:   Unable to collect pt wants tech to come back when he's done eating  Collection has been rescheduled by LW1 at 03/29/2020 12:34 Reason:   Patient Refused wants to be cleaned first before labs are drawn tech   will come back in 30 mins spoke with nurse Sharonda  Collection has been rescheduled by LW1 at 03/29/2020 13:34 Reason:   Unable to collect pt wants tech to come back when he's done eating    Blood culture [[id number]] Collected:  04/01/20 1328    Order Status:  Completed Specimen:  Blood Updated:  04/02/20 1613     Blood Culture, Routine No Growth to date      No Growth to date    Narrative:       Aerobic and anaerobic from site 2    Blood culture [[id number]] Collected:  04/01/20 1328    Order Status:  Completed Specimen:  Blood Updated:  04/02/20 1613     Blood Culture, Routine No Growth to date      No Growth to date    Narrative:       Aerobic and anaerobic from site 1    Fungus culture [[id number]] Collected:  03/29/20 2137    Order Status:  Completed Specimen:  Body Fluid from Pleural Fluid Updated:  04/02/20 0937     Fungus (Mycology) Culture Culture in progress    AFB Culture & Smear [[id number]]     Order Status:  Canceled Specimen:  Respiratory from Sputum, Expectorated     AFB Culture & Smear [[id number]] Collected:  03/30/20 1221    Order Status:  Completed Specimen:  Respiratory from Sputum, Expectorated Updated:  04/01/20 2127     AFB Culture & Smear Culture in progress     AFB CULTURE STAIN No acid fast bacilli seen.    Narrative:       Sputum for AFB x3.  One early morning sample     Culture, Respiratory with Gram Stain [[id number]]  (Abnormal)  (Susceptibility) Collected:  03/29/20 1843    Order Status:  Completed Specimen:  Sputum, Expectorated Updated:  04/01/20 1018     Respiratory Culture No Pseudomonas isolated.      METHICILLIN RESISTANT STAPHYLOCOCCUS AUREUS  Few        KLEBSIELLA PNEUMONIAE  Rare  Normal respiratory ella also present       Gram Stain (Respiratory) <10 epithelial cells per low power field.     Gram Stain (Respiratory) Many WBC's     Gram Stain (Respiratory) Rare Gram positive rods     Gram Stain (Respiratory) Rare Gram negative rods    AFB Culture & Smear [[id number]]     Order Status:  Canceled Specimen:  Respiratory from Sputum, Expectorated     Aerobic culture [[id number]]  (Abnormal) Collected:  03/29/20 2137    Order Status:  Completed Specimen:  Body Fluid from Pleural Fluid Updated:  04/01/20 0915     Aerobic Bacterial Culture LACTOBACILLUS SPECIES  Few  Further identified as Lactobacillus rhamnosus      AFB Culture & Smear [[id number]]     Order Status:  Canceled Specimen:  Respiratory from Sputum, Expectorated     AFB Culture & Smear [[id number]]     Order Status:  Canceled Specimen:  Respiratory from Sputum, Expectorated     AFB Culture & Smear [[id number]] Collected:  03/30/20 1213    Order Status:  Completed Specimen:  Respiratory from Sputum, Expectorated Updated:  03/31/20 2127     AFB Culture & Smear Culture in progress     AFB CULTURE STAIN No acid fast bacilli seen.    Narrative:       Sputum for AFB x3.  One early morning sample    AFB Culture & Smear [[id number]] Collected:  03/31/20 1155    Order Status:  Sent Specimen:  Respiratory from Sputum, Expectorated Updated:  03/31/20 1156    Respiratory Viral Panel by PCR J Carlossana; Nasal Swab [[id number]]  (Abnormal) Collected:  03/28/20 1252    Order Status:  Completed Specimen:  Respiratory Updated:  03/31/20 1055     Respiratory Virus Panel, source Nasal Swab     RVP - Adenovirus Not Detected     Comment: Detects  Serotypes B and E. Detection of Serotype C may   be limited. If Adenovirus infection is suspected and a   Not Detected result is returned the sample should be   re-tested for Adenovirus using an independent method  (e.g. [employer] Adenovirus Quantitative Real-Time  PCR test.          Enterovirus Positive     Comment: Cross-reactivity has been observed between certain Rhinovirus  strains and the Enterovirus assay.          Human Bocavirus Not Detected     Human Coronavirus, Common Cold Virus Not Detected     Comment: The Human Coronavirus assay detects Human coronavirus types  229E, OC43,NL63 and HKU1.          RVP - Human Metapneumovirus (hMPV) Not Detected     RVP - Influenza A Not Detected     Influenza A - Y2N8-59 Not Detected     RVP - Influenza B Not Detected     Parainfluenza Not Detected     Respiratory Syncytial VirusVirus (RSV) A Not Detected     Comment: The Respiratory Syncytial Viral assay detects types A and B,  however it does not distinguish between the two.          RVP - Rhinovirus Positive     Comment: Cross-Reactivity has been observed between certain   Rhinovirus strains and the Enterovirus assay.  Target Enriched Mulitplex Polymerase Chain Reaction (TEM-PCR)  allows for the detection of multiple pathogens out of a single  reaction.  This test was developed and its performance   characteristics determined by [employer].  It has not   been cleared or approved by the U.S.Food and Drug Administration.  Results should be used in conjunction with clinical findings,   and should not form the sole basis for a diagnosis or treatment  decision.  TEM-PCR is a licensed technology of [employer].         Narrative:       Receiving Lab:->[facility]    AFB Culture & Smear [[id number]]     Order Status:  Canceled Specimen:  Respiratory from Sputum, Expectorated     AFB culture (includes stain) [[id number]] Collected:  03/29/20 2137    Order Status:  Completed Specimen:  Body Fluid from  Pleural Fluid Updated:  03/31/20 0927     AFB Culture & Smear Culture in progress     AFB CULTURE STAIN No acid fast bacilli seen.    Blood culture x two cultures. Draw prior to antibiotics. [[id number]] Collected:  03/26/20 2157    Order Status:  Completed Specimen:  Blood from Peripheral, Hand, Left Updated:  03/30/20 2303     Blood Culture, Routine No Growth after 4 days.     Narrative:       Aerobic and anaerobic    Blood culture x two cultures. Draw prior to antibiotics. [[id number]] Collected:  03/26/20 2157    Order Status:  Completed Specimen:  Blood from Peripheral, Hand, Left Updated:  03/30/20 2303     Blood Culture, Routine No Growth after 4 days.     Narrative:       Aerobic and anaerobic    AFB Culture & Smear [[id number]]     Order Status:  Canceled Specimen:  Respiratory from Sputum, Expectorated     AFB Culture & Smear [[id number]] Collected:  03/29/20 1844    Order Status:  Completed Specimen:  Sputum, Expectorated Updated:  03/30/20 2127     AFB Culture & Smear Culture in progress     AFB CULTURE STAIN No acid fast bacilli seen.    AFB Culture & Smear [[id number]]     Order Status:  Canceled Specimen:  Respiratory from Sputum, Expectorated     Gram stain [[id number]] Collected:  03/29/20 2137    Order Status:  Completed Specimen:  Body Fluid from Pleural Fluid Updated:  03/30/20 0038     Gram Stain Result Moderate WBC's      No organisms seen    AFB Culture & Smear [[id number]]     Order Status:  Canceled Specimen:  Respiratory from Sputum, Expectorated     AFB Culture & Smear [[id number]]     Order Status:  Canceled Specimen:  Respiratory from Sputum, Expectorated     AFB Culture & Smear [[id number]]     Order Status:  Canceled Specimen:  Respiratory from Sputum, Expectorated     AFB Culture & Smear [[id number]]     Order Status:  Canceled Specimen:  Respiratory from Sputum, Expectorated

## 2020-04-03 NOTE — PLAN OF CARE
Patient with lactobacillus in pleural fluid - can be resistant to vancomycin, typically treat with penicillin or ampicillin.    To cover lactobacillus, E coli, and Klebsiella, discontinue ceftriaxone, start pip-tazo IV while inpatient.    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

## 2020-04-03 NOTE — PLAN OF CARE
Pt AAO and VSS. Pt receiving Vanc and ceftriazone IV abx. Pt with CT pulled by team during day. Pt educated on fall risk overnight,pt remained free from falls/trauma/injury. Denies chest pain, SOB, palpitations, dizziness, pain, or discomfort. Plan of care reviewed with pt, all questions answered. Bed locked in lowest position, call bell within reach, no acute distress noted, will continue to monitor.

## 2020-04-03 NOTE — ASSESSMENT & PLAN NOTE
Improved     -oxyCODONE immediate release [id number] mg as patient his pain is no controlled PRN, moderate pain  - should get better with draining of fluid

## 2020-04-03 NOTE — ASSESSMENT & PLAN NOTE
53 yo male with IVDU with Hep C, hx of MRSA (V sens), Pancreatitis, DM2, who presents as a transfer from [facility] for SOB and CP, CT + multifocal apical cavitations and opacities, assoc. w/ air +fluid, suspected due to MRSA pneumonia c/b parapneumonic empyema and possible septic embolization. covid -, s/d 3/28   Chest tube placed 3/30 qhs on TPA x 6 doses (2/6 doses on 3/30 and 3/29). CTS no intervention for now. TTE negative.     Plan:  - Recent Blood cultures remain negative, WBC count now coming down.   - Continue TB rule out, pending AFB samples  - Follow up with ID. Continue broad spectrum therapy, on vanc and zosyn. STOP ceftriaxone on 4/3, per ID, lactobacillus in pleural fluid can be resistant, also with E Coli/Klebsiella.  -plans to discharge with IV vanc + cefpedoxime or augmentin for 4-6 weeks total. If Repeat imaging in 4 weeks. May require doxycyline following re imaging if worsening.   - If patient leaves AMA, can offer linezolid, but would be suboptimal treatment.     - CTS consulted, unlikely to perform intervention, has had good output and decrease on O2 requirements  - TPA per pulmonology, received 5/5 doses with good output in chest tube and improvement in respiratory status. Will pull chest tube today pulmonary team is able.

## 2020-04-03 NOTE — PLAN OF CARE
Plan of care updated with patient. No falls during shift. No skin breakdown. Maintained fall protocol. Pt receiving IV Vancomycin Q12H and Zosyn Q8H. COVID-19 not detected. Home health will not accept pt r/t history of IV drug use/abuse. Telemetry orders discontinued. Pt ambulatory - requires one person/standby assist. Addressed all issues throughout shift.

## 2020-04-04 PROBLEM — D50.9 IRON DEFICIENCY ANEMIA: Chronic | Status: ACTIVE | Noted: 2020-04-04

## 2020-04-04 PROBLEM — D50.9 IRON DEFICIENCY ANEMIA: Status: ACTIVE | Noted: 2020-04-04

## 2020-04-04 PROBLEM — D75.838 REACTIVE THROMBOCYTOSIS: Status: ACTIVE | Noted: 2020-04-04

## 2020-04-04 PROBLEM — F11.10 OPIOID ABUSE: Chronic | Status: ACTIVE | Noted: 2018-08-05

## 2020-04-04 PROBLEM — R07.9 CHEST PAIN: Status: RESOLVED | Noted: 2020-03-28 | Resolved: 2020-04-04

## 2020-04-04 LAB
ANION GAP SERPL CALC-SCNC: 8 MMOL/L (ref 8–16)
ANISOCYTOSIS BLD QL SMEAR: SLIGHT
ANISOCYTOSIS BLD QL SMEAR: SLIGHT
BASOPHILS # BLD AUTO: ABNORMAL K/UL (ref 0–0.2)
BASOPHILS NFR BLD: 0 % (ref 0–1.9)
BASOPHILS NFR BLD: 0 % (ref 0–1.9)
BLD PROD TYP BPU: NORMAL
BLOOD UNIT EXPIRATION DATE: NORMAL
BLOOD UNIT TYPE CODE: 5100
BLOOD UNIT TYPE: NORMAL
BUN SERPL-MCNC: 15 MG/DL (ref 6–20)
CALCIUM SERPL-MCNC: 7.8 MG/DL (ref 8.7–10.5)
CHLORIDE SERPL-SCNC: 97 MMOL/L (ref 95–110)
CO2 SERPL-SCNC: 29 MMOL/L (ref 23–29)
CODING SYSTEM: NORMAL
CREAT SERPL-MCNC: 1.1 MG/DL (ref 0.5–1.4)
DIFFERENTIAL METHOD: ABNORMAL
DIFFERENTIAL METHOD: ABNORMAL
DISPENSE STATUS: NORMAL
EOSINOPHIL # BLD AUTO: ABNORMAL K/UL (ref 0–0.5)
EOSINOPHIL NFR BLD: 0 % (ref 0–8)
EOSINOPHIL NFR BLD: 1 % (ref 0–8)
ERYTHROCYTE [DISTWIDTH] IN BLOOD BY AUTOMATED COUNT: 17.2 % (ref 11.5–14.5)
ERYTHROCYTE [DISTWIDTH] IN BLOOD BY AUTOMATED COUNT: 17.6 % (ref 11.5–14.5)
EST. GFR  (AFRICAN AMERICAN): >60 ML/MIN/1.73 M^2
EST. GFR  (NON AFRICAN AMERICAN): >60 ML/MIN/1.73 M^2
GIANT PLATELETS BLD QL SMEAR: PRESENT
GLUCOSE SERPL-MCNC: 182 MG/DL (ref 70–110)
HCT VFR BLD AUTO: 22.1 % (ref 40–54)
HCT VFR BLD AUTO: 26.9 % (ref 40–54)
HGB BLD-MCNC: 6.5 G/DL (ref 14–18)
HGB BLD-MCNC: 8.1 G/DL (ref 14–18)
HYPOCHROMIA BLD QL SMEAR: ABNORMAL
IMM GRANULOCYTES # BLD AUTO: ABNORMAL K/UL (ref 0–0.04)
IMM GRANULOCYTES # BLD AUTO: ABNORMAL K/UL (ref 0–0.04)
IMM GRANULOCYTES NFR BLD AUTO: ABNORMAL % (ref 0–0.5)
IMM GRANULOCYTES NFR BLD AUTO: ABNORMAL % (ref 0–0.5)
LYMPHOCYTES # BLD AUTO: ABNORMAL K/UL (ref 1–4.8)
LYMPHOCYTES NFR BLD: 23 % (ref 18–48)
LYMPHOCYTES NFR BLD: 28 % (ref 18–48)
MCH RBC QN AUTO: 25.1 PG (ref 27–31)
MCH RBC QN AUTO: 25.7 PG (ref 27–31)
MCHC RBC AUTO-ENTMCNC: 29.4 G/DL (ref 32–36)
MCHC RBC AUTO-ENTMCNC: 30.1 G/DL (ref 32–36)
MCV RBC AUTO: 85 FL (ref 82–98)
MCV RBC AUTO: 85 FL (ref 82–98)
METAMYELOCYTES NFR BLD MANUAL: 1 %
METAMYELOCYTES NFR BLD MANUAL: 2 %
MONOCYTES # BLD AUTO: ABNORMAL K/UL (ref 0.3–1)
MONOCYTES NFR BLD: 1 % (ref 4–15)
MONOCYTES NFR BLD: 10 % (ref 4–15)
MYELOCYTES NFR BLD MANUAL: 1 %
MYELOCYTES NFR BLD MANUAL: 2 %
NEUTROPHILS NFR BLD: 56 % (ref 38–73)
NEUTROPHILS NFR BLD: 74 % (ref 38–73)
NRBC BLD-RTO: 0 /100 WBC
NRBC BLD-RTO: 0 /100 WBC
OVALOCYTES BLD QL SMEAR: ABNORMAL
PLATELET # BLD AUTO: 659 K/UL (ref 150–350)
PLATELET # BLD AUTO: 746 K/UL (ref 150–350)
PLATELET BLD QL SMEAR: ABNORMAL
PMV BLD AUTO: 8.9 FL (ref 9.2–12.9)
PMV BLD AUTO: 9.1 FL (ref 9.2–12.9)
POCT GLUCOSE: 256 MG/DL (ref 70–110)
POCT GLUCOSE: 325 MG/DL (ref 70–110)
POCT GLUCOSE: 347 MG/DL (ref 70–110)
POCT GLUCOSE: 411 MG/DL (ref 70–110)
POCT GLUCOSE: 494 MG/DL (ref 70–110)
POIKILOCYTOSIS BLD QL SMEAR: SLIGHT
POLYCHROMASIA BLD QL SMEAR: ABNORMAL
POTASSIUM SERPL-SCNC: 4.1 MMOL/L (ref 3.5–5.1)
PROMYELOCYTES NFR BLD MANUAL: 1 %
RBC # BLD AUTO: 2.59 M/UL (ref 4.6–6.2)
RBC # BLD AUTO: 3.15 M/UL (ref 4.6–6.2)
SODIUM SERPL-SCNC: 134 MMOL/L (ref 136–145)
TRANS ERYTHROCYTES VOL PATIENT: NORMAL ML
WBC # BLD AUTO: 8.26 K/UL (ref 3.9–12.7)
WBC # BLD AUTO: 8.94 K/UL (ref 3.9–12.7)

## 2020-04-04 PROCEDURE — 97530 THERAPEUTIC ACTIVITIES: CPT

## 2020-04-04 PROCEDURE — 36430 TRANSFUSION BLD/BLD COMPNT: CPT

## 2020-04-04 PROCEDURE — 36415 COLL VENOUS BLD VENIPUNCTURE: CPT

## 2020-04-04 PROCEDURE — 99233 PR SUBSEQUENT HOSPITAL CARE,LEVL III: ICD-10-PCS | Mod: GC,,, | Performed by: STUDENT IN AN ORGANIZED HEALTH CARE EDUCATION/TRAINING PROGRAM

## 2020-04-04 PROCEDURE — 97161 PT EVAL LOW COMPLEX 20 MIN: CPT

## 2020-04-04 PROCEDURE — 85027 COMPLETE CBC AUTOMATED: CPT | Mod: 91

## 2020-04-04 PROCEDURE — 25000003 PHARM REV CODE 250: Performed by: PHYSICIAN ASSISTANT

## 2020-04-04 PROCEDURE — 25000003 PHARM REV CODE 250: Performed by: INTERNAL MEDICINE

## 2020-04-04 PROCEDURE — 25000003 PHARM REV CODE 250: Performed by: STUDENT IN AN ORGANIZED HEALTH CARE EDUCATION/TRAINING PROGRAM

## 2020-04-04 PROCEDURE — P9021 RED BLOOD CELLS UNIT: HCPCS

## 2020-04-04 PROCEDURE — 63600175 PHARM REV CODE 636 W HCPCS: Performed by: STUDENT IN AN ORGANIZED HEALTH CARE EDUCATION/TRAINING PROGRAM

## 2020-04-04 PROCEDURE — 20600001 HC STEP DOWN PRIVATE ROOM

## 2020-04-04 PROCEDURE — 80048 BASIC METABOLIC PNL TOTAL CA: CPT

## 2020-04-04 PROCEDURE — 99233 SBSQ HOSP IP/OBS HIGH 50: CPT | Mod: GC,,, | Performed by: STUDENT IN AN ORGANIZED HEALTH CARE EDUCATION/TRAINING PROGRAM

## 2020-04-04 PROCEDURE — 85007 BL SMEAR W/DIFF WBC COUNT: CPT

## 2020-04-04 RX ORDER — HYDROCODONE BITARTRATE AND ACETAMINOPHEN 500; 5 MG/1; MG/1
TABLET ORAL
Status: DISCONTINUED | OUTPATIENT
Start: 2020-04-04 | End: 2020-04-08

## 2020-04-04 RX ORDER — ENOXAPARIN SODIUM 100 MG/ML
40 INJECTION SUBCUTANEOUS EVERY 24 HOURS
Status: DISCONTINUED | OUTPATIENT
Start: 2020-04-04 | End: 2020-04-22 | Stop reason: HOSPADM

## 2020-04-04 RX ADMIN — OXYCODONE HYDROCHLORIDE 10 MG: 10 TABLET ORAL at 10:04

## 2020-04-04 RX ADMIN — PIPERACILLIN AND TAZOBACTAM 4.5 G: 4; .5 INJECTION, POWDER, LYOPHILIZED, FOR SOLUTION INTRAVENOUS; PARENTERAL at 12:04

## 2020-04-04 RX ADMIN — OXYCODONE HYDROCHLORIDE 10 MG: 10 TABLET ORAL at 09:04

## 2020-04-04 RX ADMIN — INSULIN ASPART 3 UNITS: 100 INJECTION, SOLUTION INTRAVENOUS; SUBCUTANEOUS at 08:04

## 2020-04-04 RX ADMIN — INSULIN ASPART 4 UNITS: 100 INJECTION, SOLUTION INTRAVENOUS; SUBCUTANEOUS at 11:04

## 2020-04-04 RX ADMIN — ENOXAPARIN SODIUM 40 MG: 100 INJECTION SUBCUTANEOUS at 09:04

## 2020-04-04 RX ADMIN — INSULIN ASPART 3 UNITS: 100 INJECTION, SOLUTION INTRAVENOUS; SUBCUTANEOUS at 11:04

## 2020-04-04 RX ADMIN — PIPERACILLIN AND TAZOBACTAM 4.5 G: 4; .5 INJECTION, POWDER, LYOPHILIZED, FOR SOLUTION INTRAVENOUS; PARENTERAL at 08:04

## 2020-04-04 RX ADMIN — OXYCODONE HYDROCHLORIDE 10 MG: 10 TABLET ORAL at 01:04

## 2020-04-04 RX ADMIN — VANCOMYCIN HYDROCHLORIDE 750 MG: 750 INJECTION, POWDER, LYOPHILIZED, FOR SOLUTION INTRAVENOUS at 08:04

## 2020-04-04 RX ADMIN — OXYCODONE HYDROCHLORIDE 10 MG: 10 TABLET ORAL at 05:04

## 2020-04-04 RX ADMIN — Medication 1 TABLET: at 08:04

## 2020-04-04 RX ADMIN — ATORVASTATIN CALCIUM 20 MG: 20 TABLET, FILM COATED ORAL at 08:04

## 2020-04-04 RX ADMIN — INSULIN DETEMIR 9 UNITS: 100 INJECTION, SOLUTION SUBCUTANEOUS at 08:04

## 2020-04-04 RX ADMIN — PIPERACILLIN AND TAZOBACTAM 4.5 G: 4; .5 INJECTION, POWDER, LYOPHILIZED, FOR SOLUTION INTRAVENOUS; PARENTERAL at 02:04

## 2020-04-04 RX ADMIN — Medication 6 MG: at 10:04

## 2020-04-04 RX ADMIN — VANCOMYCIN HYDROCHLORIDE 750 MG: 750 INJECTION, POWDER, LYOPHILIZED, FOR SOLUTION INTRAVENOUS at 05:04

## 2020-04-04 RX ADMIN — ACETAMINOPHEN 650 MG: 325 TABLET ORAL at 04:04

## 2020-04-04 RX ADMIN — INSULIN ASPART 5 UNITS: 100 INJECTION, SOLUTION INTRAVENOUS; SUBCUTANEOUS at 05:04

## 2020-04-04 RX ADMIN — INSULIN ASPART 3 UNITS: 100 INJECTION, SOLUTION INTRAVENOUS; SUBCUTANEOUS at 05:04

## 2020-04-04 NOTE — PLAN OF CARE
Educated patient on plan of care, safety while in hospital, medications, prn meds, how to use call light, and fall precautions. Patient stated understanding. No falls during shift. Vitals stable. Patient is sinus rhythm on telemetry. Patient currently resting in bed. No complaints or distress at this time. Bed in lowest position, call light within reach and urinal within reach, and side rails up x2.  Will continue to monitor.

## 2020-04-04 NOTE — PLAN OF CARE
Plan of care updated with patient. No falls during shift. No skin breakdown. Maintained fall protocol. Pt receiving IV Vancomycin Q12H and Zosyn Q8H. COVID-19 not detected. Home health will not accept pt r/t history of IV drug use/abuse. One unit packed RBCs given today r/t decreased h/h. Lovenox added to medication regimen. Pt ambulatory - requires one person/standby assist. Addressed all issues throughout shift.

## 2020-04-04 NOTE — SUBJECTIVE & OBJECTIVE
Interval History: 04/04/2020 JOSE E. Feels well. Asking to take a shower today.     Review of Systems   Constitutional: Positive for fatigue. Negative for fever.   Respiratory: Negative for cough (Denies) and shortness of breath.    Cardiovascular: Negative for chest pain (Pleuritic) and leg swelling.   Gastrointestinal: Negative for abdominal pain, diarrhea, nausea and vomiting.   Genitourinary: Negative for dysuria.     Objective:     Vital Signs (Most Recent):  Temp: 97.8 °F (36.6 °C) (04/04/20 0831)  Pulse: 95 (04/04/20 0831)  Resp: 18 (04/04/20 0831)  BP: (!) 115/54 (04/04/20 0831)  SpO2: 97 % (04/04/20 0831) Vital Signs (24h Range):  Temp:  [97.8 °F (36.6 °C)-98.7 °F (37.1 °C)] 97.8 °F (36.6 °C)  Pulse:  [] 95  Resp:  [16-18] 18  SpO2:  [91 %-97 %] 97 %  BP: ()/(54-69) 115/54     Weight: 59.5 kg (131 lb 2.8 oz)  Body mass index is 19.94 kg/m².    Intake/Output Summary (Last 24 hours) at 4/4/2020 1106  Last data filed at 4/4/2020 0600  Gross per 24 hour   Intake 1080 ml   Output 1500 ml   Net -420 ml      Physical Exam   Constitutional: He is oriented to person, place, and time. He appears well-developed. No distress.   Thin   HENT:   Head: Normocephalic and atraumatic.   Eyes: Pupils are equal, round, and reactive to light. EOM are normal.   Neck: Normal range of motion. No tracheal deviation present. No thyromegaly present.   Cardiovascular: Normal rate and normal heart sounds.   No murmur heard.  Pulmonary/Chest: Effort normal and breath sounds normal. No respiratory distress. He has no rales.   Abdominal: Bowel sounds are normal. He exhibits no distension.   Musculoskeletal: Normal range of motion. He exhibits no edema.   Neurological: He is alert and oriented to person, place, and time. No cranial nerve deficit.   Skin: He is not diaphoretic.   Nursing note and vitals reviewed.      Significant Labs:   CBC:   Recent Labs   Lab 04/03/20  0521 04/03/20  1319 04/04/20  0453   WBC 10.32 9.51 8.26    HGB 7.0* 7.1* 6.5*   HCT 23.9* 24.0* 22.1*   * 674* 746*     CMP:   Recent Labs   Lab 04/03/20  0748 04/03/20  1807 04/04/20  0400   * 131* 134*   K 3.7 4.0 4.1   CL 97 96 97   CO2 24 25 29   * 295* 182*   BUN 15 17 15   CREATININE 0.9 1.1 1.1   CALCIUM 8.0* 7.6* 7.8*   ANIONGAP 10 10 8   EGFRNONAA >60.0 >60.0 >60.0       Significant Imaging: I have reviewed and interpreted all pertinent imaging results/findings within the past 24 hours.

## 2020-04-04 NOTE — ASSESSMENT & PLAN NOTE
55 yo male with IVDU with Hep C, hx of MRSA (V sens), Pancreatitis, DM2, who [place] [facility] [place], CT + multifocal apical cavitations and opacities, assoc. w/ air +fluid, suspected due to MRSA pneumonia c/b parapneumonic empyema and possible septic embolization. covid -, s/d 3/28   Chest tube placed 3/30 qhs on TPA x 6 doses (2/6 doses on 3/30 and 3/29). CTS no intervention for now. TTE negative.     Plan:  - Recent Blood cultures remain negative, WBC count now coming down.   - Continue TB rule out, pending AFB samples.   - Follow up with ID. Continue broad spectrum therapy, on vanc and zosyn. STOP ceftriaxone on 4/3, per ID, lactobacillus in pleural fluid can be resistant, also with E Coli/Klebsiella.  -plans to discharge with IV vanc + cefpedoxime or augmentin for 4-6 weeks total. If Repeat imaging in 4 weeks. May require doxycyline following re imaging if worsening.   - If patient leaves AMA, can offer linezolid, but would be suboptimal treatment.     - CTS consulted, unlikely to perform intervention, has had good output and decrease on O2 requirements  - TPA per pulmonology, received 5/5 doses with good output in chest tube and improvement in respiratory status. Will pull chest tube today pulmonary team is able.

## 2020-04-04 NOTE — ASSESSMENT & PLAN NOTE
Hb 6.5 on 4/4/2020  No active bleeding  Type/cross, transfuse x 1  Iron studies consistent with Iron deficiency and ACD

## 2020-04-04 NOTE — PROGRESS NOTES
"[facility] Hospital Medicine  Progress Note    Patient Name: Cali Mabry  MRN: [id number]  Patient Class: IP- Inpatient   Admission Date: 3/26/2020  Length of Stay: 8 days  Attending Physician: Jered Diehl MD  Primary Care Provider: Primary Doctor No    Hospital Medicine Team: [facility]OSP MED 1 Matthew Blackwell MD    Subjective:     Principal Problem:Acute respiratory failure with hypoxia        HPI:  Cali Mabry is a 54 y.o. with DM II, hepatitis C, HTN, polysubstance abuse/IVDU, pancreatitis who presents, after transfer from [facility], with a 2 day history of chest pain and shortness of breath.  Chest pain is centralized and just described as "painful." Pain aggravated with movement and deep breathing. He reports an intermittent cough that is non-productive. He denies sick contacts, fever, chills, sore throat. Upon questioning, patient stated "I have the virus so just treat me for it." Educated patient that testing is still in process. Inquired about IV drug use however he was unable to recall when he last used. Patient swatted examiner's hand after chest palpation, yelling "that hurts!" Patient became aggravated, reported his pain was now worse and requested pain medication. Per nurse, patient given PRN dose an hour prior. Patient encouraged to continue discussing his plan of care however stated, "I don't want to chat."   ED: Afebrile with leukocytosis. CTA chest from 3/26 with dense consolidative changes within the right lower lobe with internal cavitation containing air and fluid; Occlusion is seen of the right lower lobe bronchi which may be secondary to mucoid impaction or aspirated material; Additional multifocal opacities and cavitations.  Vancomycin and Zosyn administered. On 2L NC. VSS. Admitted to hospital medicine for further evaluation and COVID-19 rule out.        Overview/Hospital Course:  53 yo male with IVDU with Hep C, hx of MRSA (V sens), Pancreatitis, " DM2, who presents as a transfer from [facility] for SOB and CP, CT + multifocal apical cavitations and opacities, assoc. w/ air +fluid, suspected due to MRSA pneumonia c/b parapneumonic empyema and possible septic embolization. covid -, s/d 3/28   Chest tube placed 3/30 qhs on TPA x 6 doses (2/6 doses on 3/30 and 3/29). CTS no intervention for now. TTE negative.     04/01/2020 No acute events overnight. There was some concern with hemoptysis, however on discussion with RN and patient, there was no witnessed hemoptysis or active bleed or clots. There was a pink fluid visible in a container, and source is not clear. Patient received extra dose of 0.5 dilaudid x 2 on 3/31 and 4/1/20. He denies any difficulties breathing. He is resting comfortably in bed, watching tv, pain appears controlled.     04/02/2020 NAEON. No episodes of hemoptysis. 5/5 TPA doses given with good output. He denies chest pain. No SOB. No fevers.   04/03/2020 NAEON. No chest pain. No SOB. Eating well.   04/04/2020 NAEON. Feels well. Asking to take a shower today.     Interval History: 04/04/2020 NAEON. Feels well. Asking to take a shower today.     Review of Systems   Constitutional: Positive for fatigue. Negative for fever.   Respiratory: Negative for cough (Denies) and shortness of breath.    Cardiovascular: Negative for chest pain (Pleuritic) and leg swelling.   Gastrointestinal: Negative for abdominal pain, diarrhea, nausea and vomiting.   Genitourinary: Negative for dysuria.     Objective:     Vital Signs (Most Recent):  Temp: 97.8 °F (36.6 °C) (04/04/20 0831)  Pulse: 95 (04/04/20 0831)  Resp: 18 (04/04/20 0831)  BP: (!) 115/54 (04/04/20 0831)  SpO2: 97 % (04/04/20 0831) Vital Signs (24h Range):  Temp:  [97.8 °F (36.6 °C)-98.7 °F (37.1 °C)] 97.8 °F (36.6 °C)  Pulse:  [] 95  Resp:  [16-18] 18  SpO2:  [91 %-97 %] 97 %  BP: ()/(54-69) 115/54     Weight: 59.5 kg (131 lb 2.8 oz)  Body mass index is 19.94 kg/m².    Intake/Output Summary  (Last 24 hours) at 4/4/2020 1106  Last data filed at 4/4/2020 0600  Gross per 24 hour   Intake 1080 ml   Output 1500 ml   Net -420 ml      Physical Exam   Constitutional: He is oriented to person, place, and time. He appears well-developed. No distress.   Thin   HENT:   Head: Normocephalic and atraumatic.   Eyes: Pupils are equal, round, and reactive to light. EOM are normal.   Neck: Normal range of motion. No tracheal deviation present. No thyromegaly present.   Cardiovascular: Normal rate and normal heart sounds.   No murmur heard.  Pulmonary/Chest: Effort normal and breath sounds normal. No respiratory distress. He has no rales.   Abdominal: Bowel sounds are normal. He exhibits no distension.   Musculoskeletal: Normal range of motion. He exhibits no edema.   Neurological: He is alert and oriented to person, place, and time. No cranial nerve deficit.   Skin: He is not diaphoretic.   Nursing note and vitals reviewed.      Significant Labs:   CBC:   Recent Labs   Lab 04/03/20  0521 04/03/20  1319 04/04/20  0453   WBC 10.32 9.51 8.26   HGB 7.0* 7.1* 6.5*   HCT 23.9* 24.0* 22.1*   * 674* 746*     CMP:   Recent Labs   Lab 04/03/20  0748 04/03/20  1807 04/04/20  0400   * 131* 134*   K 3.7 4.0 4.1   CL 97 96 97   CO2 24 25 29   * 295* 182*   BUN 15 17 15   CREATININE 0.9 1.1 1.1   CALCIUM 8.0* 7.6* 7.8*   ANIONGAP 10 10 8   EGFRNONAA >60.0 >60.0 >60.0       Significant Imaging: I have reviewed and interpreted all pertinent imaging results/findings within the past 24 hours.      Assessment/Plan:      * Acute respiratory failure with hypoxia  53 yo male with IVDU with Hep C, hx of MRSA (V sens), Pancreatitis, DM2, who presents as a transfer from [facility] for SOB and CP, CT + multifocal apical cavitations and opacities, assoc. w/ air +fluid, suspected due to MRSA pneumonia c/b parapneumonic empyema and possible septic embolization. covid -, s/d 3/28   Chest tube placed 3/30 qhs on TPA x 6 doses (2/6  doses on 3/30 and 3/29). CTS no intervention for now. TTE negative.     Plan:  - Recent Blood cultures remain negative, WBC count now coming down.   - Continue TB rule out, pending AFB samples.   - Follow up with ID. Continue broad spectrum therapy, on vanc and zosyn. STOP ceftriaxone on 4/3, per ID, lactobacillus in pleural fluid can be resistant, also with E Coli/Klebsiella.  -plans to discharge with IV vanc + cefpedoxime or augmentin for 4-6 weeks total. If Repeat imaging in 4 weeks. May require doxycyline following re imaging if worsening.   - If patient leaves AMA, can offer linezolid, but would be suboptimal treatment.     - CTS consulted, unlikely to perform intervention, has had good output and decrease on O2 requirements  - TPA per pulmonology, received 5/5 doses with good output in chest tube and improvement in respiratory status. Will pull chest tube today pulmonary team is able.             Septic embolism  TTE negative  Neuro checks q4h.       Parapneumonic effusion  See above       Hyponatremia  Improved with blood sugar control and fluid hydration  component of decreased effective art volume    Continue trend     Reactive thrombocytosis  Likely 2/2 infection  Or possible bleeding, please see anemia       Iron deficiency anemia        Opioid abuse        Tobacco abuse        Chronic hepatitis C        Anemia of chronic disease        Intravenous drug abuse    -counseled on cessation      Hyperlipidemia  - continue statin       Normocytic normochromic anemia  Hb 6.5 on 4/4/2020  No active bleeding  Type/cross, transfuse x 1  Iron studies consistent with Iron deficiency and ACD        Essential hypertension  Stable, not on meds       Type 2 diabetes mellitus, uncontrolled  -HbA1c 11  - Insulin adjusted 9 long BID, 3 short TID          Opioid dependence, continuous          VTE Risk Mitigation (From admission, onward)         Ordered     enoxaparin injection 40 mg  Daily      04/04/20 0800     IP VTE HIGH  RISK PATIENT  Once      03/27/20 1511                      Matthew Blackwell MD  Department of Hospital Medicine   [facility]

## 2020-04-04 NOTE — PROGRESS NOTES
Patient refused morning vital signs. Patient is alert and oriented and no distress noted. Will notify oncoming nurse.

## 2020-04-04 NOTE — PLAN OF CARE
PT evaluation complete and appropriate goals established.    Rita Márquez, PT, DPT   2020  [phone]    Problem: Physical Therapy Goal  Goal: Physical Therapy Goal  Description  Goals to be met by: 2020     Patient will increase functional independence with mobility by performin. Sit to stand transfer with [place]  2. Gait  x 100 feet with Supervision without AD.  3. Lower extremity exercise program x15 reps, with supervision, in order to increase LE strength and (I) with functional mobility.       Outcome: Ongoing, Progressing

## 2020-04-04 NOTE — PT/OT/SLP EVAL
Physical Therapy Evaluation    Patient Name:  Cali Mabry   MRN:  [id number]    Recommendations:     Discharge Recommendations:  home   Discharge Equipment Recommendations: none   Barriers to discharge: None    Assessment:     Cali Mabry is a 54 y.o. male admitted with a medical diagnosis of Acute respiratory failure with hypoxia.  He presents with the following impairments/functional limitations:  weakness, impaired functional mobilty, impaired endurance, gait instability, decreased safety awareness, impaired balance. Pt completed functional mobility without physical assist. Ambulated approx household distance within room without AD but with slowed xiomara, impaired weight-shifting, and mild instability throughout. Pt would continue to benefit from skilled acute PT in order to address current deficits and progress functional mobility.     Rehab Prognosis: Good; patient would benefit from acute skilled PT services to address these deficits and reach maximum level of function.    Recent Surgery: * No surgery found *      Plan:     During this hospitalization, patient to be seen 2 x/week to address the identified rehab impairments via gait training, therapeutic activities, therapeutic exercises and progress toward the following goals:    · Plan of Care Expires:  05/03/20    Subjective     Chief Complaint: generalized discomfort   Patient/Family Comments/goals: Reports goal to get back to playing with his niece and nephew  Pain/Comfort:  · Pain Rating 1: (reported neck and shoulder pain; did not rate)  · Pain Addressed 1: Reposition, Distraction    Patients cultural, spiritual, Scientology conflicts given the current situation: no    Living Environment:  Pt lives with his mother in a 1SH with 0 GRAY.   Prior to admission, patients level of function was independent, including driving. Was not working PTA.  Equipment used at home: none.  DME owned (not currently used): none.  Upon discharge, patient will have  assistance from mother.    Objective:     Communicated with RN prior to session.  Patient found supine with peripheral IV  upon PT entry to room.    General Precautions: Standard, fall, airborne   Orthopedic Precautions:N/A   Braces: N/A     Exams:  · Cognitive Exam:  Patient is oriented to Person, Place, Time and Situation  · Sensation:    · -       Impaired  light/touch B feet 2* neuropathy  · RUE ROM: WFL  · RUE Strength: WFL except reported pain with flexion   · LUE ROM: WFL  · LUE Strength: WFL  · RLE ROM: WFL  · RLE Strength: WFL  · LLE ROM: WFL  · LLE Strength: WFL    Functional Mobility:  · Bed Mobility:     · Supine to Sit: supervision  · Sit to Supine: supervision  · Transfers:     · Sit to Stand:  stand by assistance with no AD and x1 rep from EOB and x1 rep from bedside chair  · Gait: ~40 ft. within room with SBA-CGA and no AD  · demo'd decreased xiomara, decreased step length, decreased toe-floor clearance, shuffling steps, foot flat with decreased heel strike and toe-off, impaired weight-shifting ability, and mild instability       Therapeutic Activities and Exercises:   Pt educated on role of PT and PT POC. Pt verbalized understanding.   Pt educated on importance of OOB activity and sitting UIC during daylight hours. However, pt declined sitting UIC at end of session, reporting that he has been sitting up majority of day.   Assisted RN with placing pressure relief mattress in order to maintain skin integrity.     AM-PAC 6 CLICK MOBILITY  Total Score:17     Patient left supine with all lines intact, call button in reach and RN notified.    GOALS:   Multidisciplinary Problems     Physical Therapy Goals        Problem: Physical Therapy Goal    Goal Priority Disciplines Outcome Goal Variances Interventions   Physical Therapy Goal     PT, PT/OT Ongoing, Progressing     Description:  Goals to be met by: 2020     Patient will increase functional independence with mobility by performin. Sit to  stand transfer with [place]  2. Gait  x 100 feet with Supervision without AD.  3. Lower extremity exercise program x15 reps, with supervision, in order to increase LE strength and (I) with functional mobility.                        History:     Past Medical History:   Diagnosis Date    Diabetes mellitus     Hepatitis     HTN (hypertension)     Pancreatitis        Past Surgical History:   Procedure Laterality Date    CARDIAC SURGERY  1999    stent placed. ([facility])    ESOPHAGOGASTRODUODENOSCOPY N/A 3/5/2020    Procedure: EGD (ESOPHAGOGASTRODUODENOSCOPY);  Surgeon: Brinda Rene MD;  Location: [facility];  Service: Endoscopy;  Laterality: N/A;  W421 A; [phone]    SHOULDER SURGERY  2013    right shoulder, spur removal.       Time Tracking:     PT Received On: 04/04/20  PT Start Time: 1441     PT Stop Time: 1514  PT Total Time (min): 33 min     Billable Minutes: Evaluation 20 and Therapeutic Activity 13      Rita Márquez, PT, DPT   4/4/2020  [phone]

## 2020-04-05 LAB
ALBUMIN SERPL BCP-MCNC: 1.6 G/DL (ref 3.5–5.2)
ALP SERPL-CCNC: 78 U/L (ref 55–135)
ALT SERPL W/O P-5'-P-CCNC: 13 U/L (ref 10–44)
ANION GAP SERPL CALC-SCNC: 7 MMOL/L (ref 8–16)
ANISOCYTOSIS BLD QL SMEAR: SLIGHT
AST SERPL-CCNC: 22 U/L (ref 10–40)
BACTERIA BLD CULT: NORMAL
BACTERIA BLD CULT: NORMAL
BASOPHILS NFR BLD: 0 % (ref 0–1.9)
BILIRUB SERPL-MCNC: 0.2 MG/DL (ref 0.1–1)
BUN SERPL-MCNC: 16 MG/DL (ref 6–20)
CALCIUM SERPL-MCNC: 7.8 MG/DL (ref 8.7–10.5)
CHLORIDE SERPL-SCNC: 99 MMOL/L (ref 95–110)
CO2 SERPL-SCNC: 29 MMOL/L (ref 23–29)
CREAT SERPL-MCNC: 1 MG/DL (ref 0.5–1.4)
DIFFERENTIAL METHOD: ABNORMAL
EOSINOPHIL NFR BLD: 1 % (ref 0–8)
ERYTHROCYTE [DISTWIDTH] IN BLOOD BY AUTOMATED COUNT: 17.3 % (ref 11.5–14.5)
EST. GFR  (AFRICAN AMERICAN): >60 ML/MIN/1.73 M^2
EST. GFR  (NON AFRICAN AMERICAN): >60 ML/MIN/1.73 M^2
GIANT PLATELETS BLD QL SMEAR: PRESENT
GLUCOSE SERPL-MCNC: 252 MG/DL (ref 70–110)
HCT VFR BLD AUTO: 26.9 % (ref 40–54)
HGB BLD-MCNC: 8.1 G/DL (ref 14–18)
IMM GRANULOCYTES # BLD AUTO: ABNORMAL K/UL (ref 0–0.04)
IMM GRANULOCYTES NFR BLD AUTO: ABNORMAL % (ref 0–0.5)
LYMPHOCYTES NFR BLD: 15 % (ref 18–48)
MAGNESIUM SERPL-MCNC: 2.1 MG/DL (ref 1.6–2.6)
MCH RBC QN AUTO: 25.6 PG (ref 27–31)
MCHC RBC AUTO-ENTMCNC: 30.1 G/DL (ref 32–36)
MCV RBC AUTO: 85 FL (ref 82–98)
METAMYELOCYTES NFR BLD MANUAL: 1 %
MONOCYTES NFR BLD: 4 % (ref 4–15)
MYELOCYTES NFR BLD MANUAL: 1 %
NEUTROPHILS NFR BLD: 78 % (ref 38–73)
NRBC BLD-RTO: 0 /100 WBC
PHOSPHATE SERPL-MCNC: 2.5 MG/DL (ref 2.7–4.5)
PLATELET # BLD AUTO: 657 K/UL (ref 150–350)
PLATELET BLD QL SMEAR: ABNORMAL
PMV BLD AUTO: 9 FL (ref 9.2–12.9)
POCT GLUCOSE: 241 MG/DL (ref 70–110)
POCT GLUCOSE: 276 MG/DL (ref 70–110)
POCT GLUCOSE: 288 MG/DL (ref 70–110)
POCT GLUCOSE: 401 MG/DL (ref 70–110)
POIKILOCYTOSIS BLD QL SMEAR: SLIGHT
POLYCHROMASIA BLD QL SMEAR: ABNORMAL
POTASSIUM SERPL-SCNC: 4.2 MMOL/L (ref 3.5–5.1)
PROT SERPL-MCNC: 7.1 G/DL (ref 6–8.4)
RBC # BLD AUTO: 3.16 M/UL (ref 4.6–6.2)
SCHISTOCYTES BLD QL SMEAR: ABNORMAL
SCHISTOCYTES BLD QL SMEAR: PRESENT
SODIUM SERPL-SCNC: 135 MMOL/L (ref 136–145)
VANCOMYCIN TROUGH SERPL-MCNC: 20 UG/ML (ref 10–22)
WBC # BLD AUTO: 8.7 K/UL (ref 3.9–12.7)

## 2020-04-05 PROCEDURE — 80202 ASSAY OF VANCOMYCIN: CPT

## 2020-04-05 PROCEDURE — 85027 COMPLETE CBC AUTOMATED: CPT

## 2020-04-05 PROCEDURE — 84100 ASSAY OF PHOSPHORUS: CPT

## 2020-04-05 PROCEDURE — 20600001 HC STEP DOWN PRIVATE ROOM

## 2020-04-05 PROCEDURE — 99233 SBSQ HOSP IP/OBS HIGH 50: CPT | Mod: GC,,, | Performed by: STUDENT IN AN ORGANIZED HEALTH CARE EDUCATION/TRAINING PROGRAM

## 2020-04-05 PROCEDURE — 63600175 PHARM REV CODE 636 W HCPCS: Performed by: STUDENT IN AN ORGANIZED HEALTH CARE EDUCATION/TRAINING PROGRAM

## 2020-04-05 PROCEDURE — 99233 PR SUBSEQUENT HOSPITAL CARE,LEVL III: ICD-10-PCS | Mod: GC,,, | Performed by: STUDENT IN AN ORGANIZED HEALTH CARE EDUCATION/TRAINING PROGRAM

## 2020-04-05 PROCEDURE — 25000003 PHARM REV CODE 250: Performed by: STUDENT IN AN ORGANIZED HEALTH CARE EDUCATION/TRAINING PROGRAM

## 2020-04-05 PROCEDURE — 25000003 PHARM REV CODE 250: Performed by: INTERNAL MEDICINE

## 2020-04-05 PROCEDURE — 36415 COLL VENOUS BLD VENIPUNCTURE: CPT

## 2020-04-05 PROCEDURE — 83735 ASSAY OF MAGNESIUM: CPT

## 2020-04-05 PROCEDURE — 85007 BL SMEAR W/DIFF WBC COUNT: CPT

## 2020-04-05 PROCEDURE — 80053 COMPREHEN METABOLIC PANEL: CPT

## 2020-04-05 PROCEDURE — 25000003 PHARM REV CODE 250: Performed by: PHYSICIAN ASSISTANT

## 2020-04-05 RX ORDER — INSULIN ASPART 100 [IU]/ML
5 INJECTION, SOLUTION INTRAVENOUS; SUBCUTANEOUS
Status: DISCONTINUED | OUTPATIENT
Start: 2020-04-05 | End: 2020-04-06

## 2020-04-05 RX ADMIN — ACETAMINOPHEN 650 MG: 325 TABLET ORAL at 10:04

## 2020-04-05 RX ADMIN — ATORVASTATIN CALCIUM 20 MG: 20 TABLET, FILM COATED ORAL at 08:04

## 2020-04-05 RX ADMIN — INSULIN ASPART 5 UNITS: 100 INJECTION, SOLUTION INTRAVENOUS; SUBCUTANEOUS at 05:04

## 2020-04-05 RX ADMIN — INSULIN ASPART 1 UNITS: 100 INJECTION, SOLUTION INTRAVENOUS; SUBCUTANEOUS at 08:04

## 2020-04-05 RX ADMIN — INSULIN ASPART 3 UNITS: 100 INJECTION, SOLUTION INTRAVENOUS; SUBCUTANEOUS at 05:04

## 2020-04-05 RX ADMIN — ENOXAPARIN SODIUM 40 MG: 100 INJECTION SUBCUTANEOUS at 08:04

## 2020-04-05 RX ADMIN — Medication 6 MG: at 10:04

## 2020-04-05 RX ADMIN — OXYCODONE HYDROCHLORIDE 10 MG: 10 TABLET ORAL at 06:04

## 2020-04-05 RX ADMIN — OXYCODONE HYDROCHLORIDE 10 MG: 10 TABLET ORAL at 10:04

## 2020-04-05 RX ADMIN — Medication 1 TABLET: at 08:04

## 2020-04-05 RX ADMIN — INSULIN ASPART 3 UNITS: 100 INJECTION, SOLUTION INTRAVENOUS; SUBCUTANEOUS at 08:04

## 2020-04-05 RX ADMIN — PIPERACILLIN AND TAZOBACTAM 4.5 G: 4; .5 INJECTION, POWDER, LYOPHILIZED, FOR SOLUTION INTRAVENOUS; PARENTERAL at 01:04

## 2020-04-05 RX ADMIN — INSULIN ASPART 5 UNITS: 100 INJECTION, SOLUTION INTRAVENOUS; SUBCUTANEOUS at 11:04

## 2020-04-05 RX ADMIN — PIPERACILLIN AND TAZOBACTAM 4.5 G: 4; .5 INJECTION, POWDER, LYOPHILIZED, FOR SOLUTION INTRAVENOUS; PARENTERAL at 08:04

## 2020-04-05 RX ADMIN — OXYCODONE HYDROCHLORIDE 10 MG: 10 TABLET ORAL at 04:04

## 2020-04-05 RX ADMIN — VANCOMYCIN HYDROCHLORIDE 750 MG: 750 INJECTION, POWDER, LYOPHILIZED, FOR SOLUTION INTRAVENOUS at 08:04

## 2020-04-05 RX ADMIN — PIPERACILLIN AND TAZOBACTAM 4.5 G: 4; .5 INJECTION, POWDER, LYOPHILIZED, FOR SOLUTION INTRAVENOUS; PARENTERAL at 04:04

## 2020-04-05 RX ADMIN — OXYCODONE HYDROCHLORIDE 10 MG: 10 TABLET ORAL at 01:04

## 2020-04-05 RX ADMIN — OXYCODONE HYDROCHLORIDE 10 MG: 10 TABLET ORAL at 08:04

## 2020-04-05 NOTE — NURSING
"Pt refused dinner time vital signs and blood glucose monitoring. Pt was upset with PCT and asked her to "not come back" into his room. RN will re attempt vital signs and blood glucose check when delivering dinner to pt room.   "

## 2020-04-05 NOTE — PROGRESS NOTES
"[facility] Hospital Medicine  Progress Note    Patient Name: Cali Mabry  MRN: [id number]  Patient Class: IP- Inpatient   Admission Date: 3/26/2020  Length of Stay: 9 days  Attending Physician: Jered Diehl MD  Primary Care Provider: Primary Doctor No    Hospital Medicine Team: [facility] [place] [facility] 1 Matthew Blackwell MD    Subjective:     Principal Problem:Acute respiratory failure with hypoxia        HPI:  Cali Mabry is a 54 y.o. with DM II, hepatitis C, HTN, polysubstance abuse/IVDU, pancreatitis who presents, after transfer from [facility] [place], with a 2 day history of chest pain and shortness of breath.  Chest pain is centralized and just described as "painful." Pain aggravated with movement and deep breathing. He reports an intermittent cough that is non-productive. He denies sick contacts, fever, chills, sore throat. Upon questioning, patient stated "I have the virus so just treat me for it." Educated patient that testing is still in process. Inquired about IV drug use however he was unable to recall when he last used. Patient swatted examiner's hand after chest palpation, yelling "that hurts!" Patient became aggravated, reported his pain was now worse and requested pain medication. Per nurse, patient given PRN dose an hour prior. Patient encouraged to continue discussing his plan of care however stated, "I don't want to chat."   ED: Afebrile with leukocytosis. CTA chest from 3/26 with dense consolidative changes within the right lower lobe with internal cavitation containing air and fluid; Occlusion is seen of the right lower lobe bronchi which may be secondary to mucoid impaction or aspirated material; Additional multifocal opacities and cavitations.  Vancomycin and Zosyn administered. On 2L NC. VSS. Admitted to hospital medicine for further evaluation and COVID-19 rule out.        Overview/Hospital Course:  55 yo male with IVDU with Hep C, hx of MRSA (V sens), Pancreatitis, " DM2, who presents as a transfer from [facility] [place] for SOB and CP, CT + multifocal apical cavitations and opacities, assoc. w/ air +fluid, suspected due to MRSA pneumonia c/b parapneumonic empyema and possible septic embolization. covid -, s/d 3/28   Chest tube placed 3/30 qhs on TPA x 6 doses (2/6 doses on 3/30 and 3/29). CTS no intervention for now. TTE negative.     04/01/2020 No acute events overnight. There was some concern with hemoptysis, however on discussion with RN and patient, there was no witnessed hemoptysis or active bleed or clots. There was a pink fluid visible in a container, and source is not clear. Patient received extra dose of 0.5 dilaudid x 2 on 3/31 and 4/1/20. He denies any difficulties breathing. He is resting comfortably in bed, watching tv, pain appears controlled.     04/02/2020 NAEON. No episodes of hemoptysis. 5/5 TPA doses given with good output. He denies chest pain. No SOB. No fevers.   04/03/2020 NAEON. No chest pain. No SOB. Eating well.   04/04/2020 NAEON. Feels well. Asking to take a shower today.     04/05/2020 NAEON. No pain. No SOB. No fevers. Having Bowel movements+    Interval History: 04/05/2020 NAEON. No pain. No SOB. No fevers. Having Bowel movements      Review of Systems   Constitutional: Positive for fatigue. Negative for fever.   Respiratory: Negative for cough (Denies) and shortness of breath.    Cardiovascular: Negative for chest pain (Pleuritic) and leg swelling.   Gastrointestinal: Negative for abdominal pain, diarrhea, nausea and vomiting.   Genitourinary: Negative for dysuria.     Objective:     Vital Signs (Most Recent):  Temp: 97.8 °F (36.6 °C) (04/05/20 0757)  Pulse: 91 (04/05/20 0757)  Resp: 18 (04/05/20 0757)  BP: (!) 114/52 (04/05/20 0757)  SpO2: (!) 94 % (04/05/20 0757) Vital Signs (24h Range):  Temp:  [97.8 °F (36.6 °C)-98.7 °F (37.1 °C)] 97.8 °F (36.6 °C)  Pulse:  [78-95] 91  Resp:  [15-18] 18  SpO2:  [91 %-98 %] 94 %  BP: (100-159)/(52-64) 114/52      Weight: 59.5 kg (131 lb 2.8 oz)  Body mass index is 19.94 kg/m².    Intake/Output Summary (Last 24 hours) at 4/5/2020 1141  Last data filed at 4/5/2020 1000  Gross per 24 hour   Intake 2327.5 ml   Output 2450 ml   Net -122.5 ml      Physical Exam   Constitutional: He is oriented to person, place, and time. He appears well-developed. No distress.   Thin   HENT:   Head: Normocephalic and atraumatic.   Eyes: Pupils are equal, round, and reactive to light. EOM are normal.   Neck: Normal range of motion. No tracheal deviation present. No thyromegaly present.   Cardiovascular: Normal rate and normal heart sounds.   No murmur heard.  Pulmonary/Chest: Effort normal and breath sounds normal. No respiratory distress. He has no rales.   Abdominal: Bowel sounds are normal. He exhibits no distension.   Musculoskeletal: Normal range of motion. He exhibits no edema.   Neurological: He is alert and oriented to person, place, and time. No cranial nerve deficit.   Skin: He is not diaphoretic.   Nursing note and vitals reviewed.      Significant Labs:   CBC:   Recent Labs   Lab 04/04/20  0453 04/04/20  2127 04/05/20  0445   WBC 8.26 8.94 8.70   HGB 6.5* 8.1* 8.1*   HCT 22.1* 26.9* 26.9*   * 659* 657*     CMP:   Recent Labs   Lab 04/03/20  1807 04/04/20  0400 04/05/20  0445   * 134* 135*   K 4.0 4.1 4.2   CL 96 97 99   CO2 25 29 29   * 182* 252*   BUN 17 15 16   CREATININE 1.1 1.1 1.0   CALCIUM 7.6* 7.8* 7.8*   PROT  --   --  7.1   ALBUMIN  --   --  1.6*   BILITOT  --   --  0.2   ALKPHOS  --   --  78   AST  --   --  22   ALT  --   --  13   ANIONGAP 10 8 7*   EGFRNONAA >60.0 >60.0 >60.0       Significant Imaging: I have reviewed and interpreted all pertinent imaging results/findings within the past 24 hours.      Assessment/Plan:      * Acute respiratory failure with hypoxia  55 yo male with IVDU with Hep C, hx of MRSA (V sens), Pancreatitis, DM2, who presents as a transfer from [facility] [place] for SOB and CP, CT +  multifocal apical cavitations and opacities, assoc. w/ air +fluid, suspected due to MRSA pneumonia c/b parapneumonic empyema and possible septic embolization. covid -, s/d 3/28   Chest tube placed 3/30 qhs on TPA x 6 doses (2/6 doses on 3/30 and 3/29). CTS no intervention for now. TTE negative.     Plan:  - Recent Blood cultures remain negative, WBC count now coming down.    - Continue TB rule out, pending AFB samples. No acid fast bacilli on gram stain.   - Follow up with ID. Continue broad spectrum therapy, on vanc and zosyn. STOP ceftriaxone on 4/3, per ID, lactobacillus in pleural fluid can be resistant, also with E Coli/Klebsiella.  -plans to discharge with IV vanc + cefpedoxime or augmentin for 4-6 weeks total. If Repeat imaging in 4 weeks. May require doxycyline following re imaging if worsening.   - If patient leaves AMA, can offer linezolid, but would be suboptimal treatment.     - CTS consulted, unlikely to perform intervention, has had good output and decrease on O2 requirements  - TPA per pulmonology, received 5/5 doses with good output in chest tube and improvement in respiratory status. Will pull chest tube today pulmonary team is able.             Septic embolism  TTE negative  Neuro checks q4h.       Parapneumonic effusion  See above       Hyponatremia  Improved with blood sugar control and fluid hydration  component of decreased effective art volume    Continue trend     Reactive thrombocytosis  Likely 2/2 infection  Or possible bleeding, please see anemia       Iron deficiency anemia        Opioid abuse        Tobacco abuse        Chronic hepatitis C        Anemia of chronic disease        Intravenous drug abuse    -counseled on cessation      Hyperlipidemia  - continue statin       Normocytic normochromic anemia  Hb 6.5 on 4/4/2020 s/p transfusion x 1 prbc  Stable with appropriate response Hb.   No active bleeding  Type/cross  Iron studies consistent with Iron deficiency and ACD        Essential  hypertension  Stable, not on meds       Type 2 diabetes mellitus, uncontrolled  -HbA1c 11  - Insulin adjusted 12 long BID, 5 short TID          Opioid dependence, continuous          VTE Risk Mitigation (From admission, onward)         Ordered     enoxaparin injection 40 mg  Daily      04/04/20 0800     IP VTE HIGH RISK PATIENT  Once      03/27/20 1511                      Matthew Blackwell MD  Department of Hospital Medicine   [facility]

## 2020-04-05 NOTE — ASSESSMENT & PLAN NOTE
55 yo male with IVDU with Hep C, hx of MRSA (V sens), Pancreatitis, DM2, who presents as a transfer from [facility] for SOB and CP, CT + multifocal apical cavitations and opacities, assoc. w/ air +fluid, suspected due to MRSA pneumonia c/b parapneumonic empyema and possible septic embolization. covid -, s/d 3/28   Chest tube placed 3/30 qhs on TPA x 6 doses (2/6 doses on 3/30 and 3/29). CTS no intervention for now. TTE negative.     Plan:  - Recent Blood cultures remain negative, WBC count now coming down.    - Continue TB rule out, pending AFB samples. No acid fast bacilli on gram stain.   - Follow up with ID. Continue broad spectrum therapy, on vanc and zosyn. STOP ceftriaxone on 4/3, per ID, lactobacillus in pleural fluid can be resistant, also with E Coli/Klebsiella.  -plans to discharge with IV vanc + cefpedoxime or augmentin for 4-6 weeks total. If Repeat imaging in 4 weeks. May require doxycyline following re imaging if worsening.   - If patient leaves AMA, can offer linezolid, but would be suboptimal treatment.     - CTS consulted, unlikely to perform intervention, has had good output and decrease on O2 requirements  - TPA per pulmonology, received 5/5 doses with good output in chest tube and improvement in respiratory status. Will pull chest tube today pulmonary team is able.

## 2020-04-05 NOTE — SUBJECTIVE & OBJECTIVE
Interval History: 04/05/2020 NAEON. No pain. No SOB. No fevers. Having Bowel movements      Review of Systems   Constitutional: Positive for fatigue. Negative for fever.   Respiratory: Negative for cough (Denies) and shortness of breath.    Cardiovascular: Negative for chest pain (Pleuritic) and leg swelling.   Gastrointestinal: Negative for abdominal pain, diarrhea, nausea and vomiting.   Genitourinary: Negative for dysuria.     Objective:     Vital Signs (Most Recent):  Temp: 97.8 °F (36.6 °C) (04/05/20 0757)  Pulse: 91 (04/05/20 0757)  Resp: 18 (04/05/20 0757)  BP: (!) 114/52 (04/05/20 0757)  SpO2: (!) 94 % (04/05/20 0757) Vital Signs (24h Range):  Temp:  [97.8 °F (36.6 °C)-98.7 °F (37.1 °C)] 97.8 °F (36.6 °C)  Pulse:  [78-95] 91  Resp:  [15-18] 18  SpO2:  [91 %-98 %] 94 %  BP: (100-159)/(52-64) 114/52     Weight: 59.5 kg (131 lb 2.8 oz)  Body mass index is 19.94 kg/m².    Intake/Output Summary (Last 24 hours) at 4/5/2020 1141  Last data filed at 4/5/2020 1000  Gross per 24 hour   Intake 2327.5 ml   Output 2450 ml   Net -122.5 ml      Physical Exam   Constitutional: He is oriented to person, place, and time. He appears well-developed. No distress.   Thin   HENT:   Head: Normocephalic and atraumatic.   Eyes: Pupils are equal, round, and reactive to light. EOM are normal.   Neck: Normal range of motion. No tracheal deviation present. No thyromegaly present.   Cardiovascular: Normal rate and normal heart sounds.   No murmur heard.  Pulmonary/Chest: Effort normal and breath sounds normal. No respiratory distress. He has no rales.   Abdominal: Bowel sounds are normal. He exhibits no distension.   Musculoskeletal: Normal range of motion. He exhibits no edema.   Neurological: He is alert and oriented to person, place, and time. No cranial nerve deficit.   Skin: He is not diaphoretic.   Nursing note and vitals reviewed.      Significant Labs:   CBC:   Recent Labs   Lab 04/04/20  0453 04/04/20  2127 04/05/20  0445   WBC  8.26 8.94 8.70   HGB 6.5* 8.1* 8.1*   HCT 22.1* 26.9* 26.9*   * 659* 657*     CMP:   Recent Labs   Lab 04/03/20  1807 04/04/20  0400 04/05/20  0445   * 134* 135*   K 4.0 4.1 4.2   CL 96 97 99   CO2 25 29 29   * 182* 252*   BUN 17 15 16   CREATININE 1.1 1.1 1.0   CALCIUM 7.6* 7.8* 7.8*   PROT  --   --  7.1   ALBUMIN  --   --  1.6*   BILITOT  --   --  0.2   ALKPHOS  --   --  78   AST  --   --  22   ALT  --   --  13   ANIONGAP 10 8 7*   EGFRNONAA >60.0 >60.0 >60.0       Significant Imaging: I have reviewed and interpreted all pertinent imaging results/findings within the past 24 hours.

## 2020-04-05 NOTE — PLAN OF CARE
Pt AAO and VSS. Pt receiving Vanc and Zosyn IV abx.  Pt educated on fall risk overnight,pt remained free from falls/trauma/injury. Denies chest pain, SOB, palpitations, dizziness, pain, or discomfort. Plan of care reviewed with pt, all questions answered. Bed locked in lowest position, call bell within reach, no acute distress noted, will continue to monitor.

## 2020-04-05 NOTE — NURSING
Pt  at lunch time. 5 units mealtime plus an additional 5 units correction insulin given. Team notified. Will continue to monitor.    Pt phones stating his Ad[place]all [place] was [place] [facility] [place].  Order Teed up, please advise.      Diego Reis

## 2020-04-05 NOTE — PLAN OF CARE
Plan of care updated with patient. No falls during shift. No skin breakdown. Maintained fall protocol. Pt receiving IV Vancomycin Q12H and Zosyn Q8H. COVID-19 not detected. Home health will not accept pt r/t history of IV drug use/abuse. Pt ambulatory - requires standby assist. Addressed all issues throughout shift.

## 2020-04-05 NOTE — ASSESSMENT & PLAN NOTE
Hb 6.5 on 4/4/2020 s/p transfusion x 1 prbc  Stable with appropriate response Hb.   No active bleeding  Type/cross  Iron studies consistent with Iron deficiency and ACD

## 2020-04-06 LAB
ANION GAP SERPL CALC-SCNC: 10 MMOL/L (ref 8–16)
BUN SERPL-MCNC: 21 MG/DL (ref 6–20)
CALCIUM SERPL-MCNC: 8.3 MG/DL (ref 8.7–10.5)
CHLORIDE SERPL-SCNC: 92 MMOL/L (ref 95–110)
CO2 SERPL-SCNC: 29 MMOL/L (ref 23–29)
CREAT SERPL-MCNC: 1.4 MG/DL (ref 0.5–1.4)
EST. GFR  (AFRICAN AMERICAN): >60 ML/MIN/1.73 M^2
EST. GFR  (NON AFRICAN AMERICAN): 57 ML/MIN/1.73 M^2
GLUCOSE SERPL-MCNC: 540 MG/DL (ref 70–110)
HCV RNA SERPL NAA+PROBE-LOG IU: 5.79 LOG (10) IU/ML
HCV RNA SERPL QL NAA+PROBE: DETECTED IU/ML
HCV RNA SPEC NAA+PROBE-ACNC: ABNORMAL IU/ML
POCT GLUCOSE: 269 MG/DL (ref 70–110)
POCT GLUCOSE: 495 MG/DL (ref 70–110)
POTASSIUM SERPL-SCNC: 5 MMOL/L (ref 3.5–5.1)
SODIUM SERPL-SCNC: 131 MMOL/L (ref 136–145)

## 2020-04-06 PROCEDURE — 20600001 HC STEP DOWN PRIVATE ROOM

## 2020-04-06 PROCEDURE — 80048 BASIC METABOLIC PNL TOTAL CA: CPT

## 2020-04-06 PROCEDURE — 25000003 PHARM REV CODE 250: Performed by: STUDENT IN AN ORGANIZED HEALTH CARE EDUCATION/TRAINING PROGRAM

## 2020-04-06 PROCEDURE — 63600175 PHARM REV CODE 636 W HCPCS: Performed by: STUDENT IN AN ORGANIZED HEALTH CARE EDUCATION/TRAINING PROGRAM

## 2020-04-06 PROCEDURE — 36415 COLL VENOUS BLD VENIPUNCTURE: CPT

## 2020-04-06 PROCEDURE — 99233 SBSQ HOSP IP/OBS HIGH 50: CPT | Mod: GC,,, | Performed by: STUDENT IN AN ORGANIZED HEALTH CARE EDUCATION/TRAINING PROGRAM

## 2020-04-06 PROCEDURE — 25000003 PHARM REV CODE 250: Performed by: PHYSICIAN ASSISTANT

## 2020-04-06 PROCEDURE — 99233 PR SUBSEQUENT HOSPITAL CARE,LEVL III: ICD-10-PCS | Mod: GC,,, | Performed by: STUDENT IN AN ORGANIZED HEALTH CARE EDUCATION/TRAINING PROGRAM

## 2020-04-06 PROCEDURE — 25000003 PHARM REV CODE 250: Performed by: INTERNAL MEDICINE

## 2020-04-06 RX ORDER — INSULIN ASPART 100 [IU]/ML
7 INJECTION, SOLUTION INTRAVENOUS; SUBCUTANEOUS
Status: DISCONTINUED | OUTPATIENT
Start: 2020-04-06 | End: 2020-04-12

## 2020-04-06 RX ORDER — LIDOCAINE 50 MG/G
1 PATCH TOPICAL EVERY 24 HOURS
Status: DISCONTINUED | OUTPATIENT
Start: 2020-04-06 | End: 2020-04-22 | Stop reason: HOSPADM

## 2020-04-06 RX ADMIN — INSULIN ASPART 5 UNITS: 100 INJECTION, SOLUTION INTRAVENOUS; SUBCUTANEOUS at 12:04

## 2020-04-06 RX ADMIN — INSULIN ASPART 7 UNITS: 100 INJECTION, SOLUTION INTRAVENOUS; SUBCUTANEOUS at 12:04

## 2020-04-06 RX ADMIN — OXYCODONE HYDROCHLORIDE 10 MG: 10 TABLET ORAL at 10:04

## 2020-04-06 RX ADMIN — INSULIN ASPART 5 UNITS: 100 INJECTION, SOLUTION INTRAVENOUS; SUBCUTANEOUS at 05:04

## 2020-04-06 RX ADMIN — OXYCODONE HYDROCHLORIDE 10 MG: 10 TABLET ORAL at 02:04

## 2020-04-06 RX ADMIN — INSULIN ASPART 5 UNITS: 100 INJECTION, SOLUTION INTRAVENOUS; SUBCUTANEOUS at 08:04

## 2020-04-06 RX ADMIN — OXYCODONE HYDROCHLORIDE 10 MG: 10 TABLET ORAL at 09:04

## 2020-04-06 RX ADMIN — INSULIN ASPART 7 UNITS: 100 INJECTION, SOLUTION INTRAVENOUS; SUBCUTANEOUS at 05:04

## 2020-04-06 RX ADMIN — LIDOCAINE 1 PATCH: 50 PATCH CUTANEOUS at 01:04

## 2020-04-06 RX ADMIN — PIPERACILLIN AND TAZOBACTAM 4.5 G: 4; .5 INJECTION, POWDER, LYOPHILIZED, FOR SOLUTION INTRAVENOUS; PARENTERAL at 01:04

## 2020-04-06 RX ADMIN — OXYCODONE HYDROCHLORIDE 10 MG: 10 TABLET ORAL at 05:04

## 2020-04-06 RX ADMIN — VANCOMYCIN HYDROCHLORIDE 750 MG: 750 INJECTION, POWDER, LYOPHILIZED, FOR SOLUTION INTRAVENOUS at 10:04

## 2020-04-06 RX ADMIN — ATORVASTATIN CALCIUM 20 MG: 20 TABLET, FILM COATED ORAL at 09:04

## 2020-04-06 RX ADMIN — PIPERACILLIN AND TAZOBACTAM 4.5 G: 4; .5 INJECTION, POWDER, LYOPHILIZED, FOR SOLUTION INTRAVENOUS; PARENTERAL at 05:04

## 2020-04-06 RX ADMIN — Medication 6 MG: at 10:04

## 2020-04-06 RX ADMIN — VANCOMYCIN HYDROCHLORIDE 750 MG: 750 INJECTION, POWDER, LYOPHILIZED, FOR SOLUTION INTRAVENOUS at 08:04

## 2020-04-06 RX ADMIN — PIPERACILLIN AND TAZOBACTAM 4.5 G: 4; .5 INJECTION, POWDER, LYOPHILIZED, FOR SOLUTION INTRAVENOUS; PARENTERAL at 08:04

## 2020-04-06 RX ADMIN — Medication 1 TABLET: at 09:04

## 2020-04-06 NOTE — PLAN OF CARE
Plan of care discussed with patient. Patient is free of fall/trauma/injury. Denies CP, SOB, or pain/discomfort. All questions addressed.Call light within reach, AAOx4. Pt agitated, but willing to cooperate. Will continue to monitor. Pt , notified MD. STAT BMP ordered. Pending results.

## 2020-04-06 NOTE — ASSESSMENT & PLAN NOTE
53 yo male with IVDU with Hep C, hx of MRSA (V sens), Pancreatitis, DM2, who presents as a transfer from [facility] for SOB and CP, CT + multifocal apical cavitations and opacities, assoc. w/ air +fluid, suspected due to MRSA pneumonia c/b parapneumonic empyema and possible septic embolization. covid -, s/d 3/28   Chest tube placed 3/30 qhs on TPA x 6 doses (2/6 doses on 3/30 and 3/29). CTS no intervention for now. TTE negative.   Remains stable on vanc and zosyn, cultures remain clear. Disposition- unable to place to  due to hx of IVDU. Patient does not desire outside facility transfer.     Plan:  - Recent Blood cultures remain negative, WBC count now coming down.    - Continue TB rule out, pending AFB samples. No acid fast bacilli on gram stain.   - Follow up with ID. Continue broad spectrum therapy, on vanc and zosyn. STOP ceftriaxone on 4/3, per ID, lactobacillus in pleural fluid can be resistant, also with E Coli/Klebsiella.  -plans to discharge with IV vanc + cefpedoxime or augmentin for 4-6 weeks total. If Repeat imaging in 4 weeks. May require doxycyline following re imaging if worsening.   - If patient leaves AMA, can offer linezolid, but would be suboptimal treatment.     - CTS consulted, unlikely to perform intervention, has had good output and decrease on O2 requirements  - TPA per pulmonology, received 5/5 doses with good output in chest tube and improvement in respiratory status. Will pull chest tube today pulmonary team is able.

## 2020-04-06 NOTE — PROGRESS NOTES
"[facility] Hospital Medicine  Progress Note    Patient Name: Cali Mabry  MRN: [id number]  Patient Class: IP- Inpatient   Admission Date: 3/26/2020  Length of Stay: 10 days  Attending Physician: Jered Diehl MD  Primary Care Provider: Primary Doctor No    [place]ospital Medicine [place] [facility] [place] Matthew Blackwell MD    Subjective:     Principal Problem:Acute respiratory failure with hypoxia        HPI:  Cali Mabry is a 54 y.o. with DM II, hepatitis C, HTN, polysubstance abuse/IVDU, pancreatitis who presents, after transfer [place] [facility] [place], with a 2 day history of chest pain and shortness of breath.  Chest pain is centralized and just described as "painful." Pain aggravated with movement and deep breathing. He reports an intermittent cough that is non-productive. He denies sick contacts, fever, chills, sore throat. Upon questioning, patient stated "I have the virus so just treat me for it." Educated patient that testing is still in process. Inquired about IV drug use however he was unable to recall when he last used. Patient swatted examiner's hand after chest palpation, yelling "that hurts!" Patient became aggravated, reported his pain was now worse and requested pain medication. Per nurse, patient given PRN dose an hour prior. Patient encouraged to continue discussing his plan of care however stated, "I don't want to chat."   ED: Afebrile with leukocytosis. CTA chest from 3/26 with dense consolidative changes within the right lower lobe with internal cavitation containing air and fluid; Occlusion is seen of the right lower lobe bronchi which may be secondary to mucoid impaction or aspirated material; Additional multifocal opacities and cavitations.  Vancomycin and Zosyn administered. On 2L NC. VSS. Admitted to hospital medicine for further evaluation and COVID-19 rule out.        Overview/Hospital Course:  53 yo male with IVDU with Hep C, hx of MRSA (V sens), Pancreatitis, " DM2, who presents as a transfer from [facility] [place] for SOB and CP, CT + multifocal apical cavitations and opacities, assoc. w/ air +fluid, suspected due to MRSA pneumonia c/b parapneumonic empyema and possible septic embolization. covid -, s/d 3/28   Chest tube placed 3/30 qhs on TPA x 6 doses (2/6 doses on 3/30 and 3/29). CTS no intervention for now. TTE negative.     04/01/2020 No acute events overnight. There was some concern with hemoptysis, however on discussion with RN and patient, there was no witnessed hemoptysis or active bleed or clots. There was a pink fluid visible in a container, and source is not clear. Patient received extra dose of 0.5 dilaudid x 2 on 3/31 and 4/1/20. He denies any difficulties breathing. He is resting comfortably in bed, watching tv, pain appears controlled.     04/02/2020 NAEON. No episodes of hemoptysis. 5/5 TPA doses given with good output. He denies chest pain. No SOB. No fevers.   04/03/2020 NAEON. No chest pain. No SOB. Eating well.   04/04/2020 NAEON. Feels well. Asking to take a shower today.     04/05/2020 NAEON. No pain. No SOB. No fevers. Having Bowel movements+  04/06/2020 NAEON. Has some mild right shoulder pain. No other pain. No SOB.     Interval History: 04/06/2020 NAEON. Has some mild right shoulder pain. No other pain. No SOB.     Review of Systems   Constitutional: Positive for fatigue. Negative for fever.   Respiratory: Negative for cough (Denies) and shortness of breath.    Cardiovascular: Negative for chest pain (Pleuritic) and leg swelling.   Gastrointestinal: Negative for abdominal pain, diarrhea, nausea and vomiting.   Genitourinary: Negative for dysuria.     Objective:     Vital Signs (Most Recent):  Temp: 99.3 °F (37.4 °C) (04/05/20 2030)  Pulse: 85 (04/06/20 0844)  Resp: 17 (04/06/20 0844)  BP: 110/64 (04/06/20 0844)  SpO2: (!) 90 % (04/06/20 0844) Vital Signs (24h Range):  Temp:  [99.3 °F (37.4 °C)] 99.3 °F (37.4 °C)  Pulse:  [85-86] 85  Resp:  [16-17]  17  SpO2:  [90 %-96 %] 90 %  BP: (110-135)/(64-72) 110/64     Weight: 59.5 kg (131 lb 2.8 oz)  Body mass index is 19.94 kg/m².    Intake/Output Summary (Last 24 hours) at 4/6/2020 1016  Last data filed at 4/6/2020 0527  Gross per 24 hour   Intake 600 ml   Output 2050 ml   Net -1450 ml      Physical Exam   Constitutional: He is oriented to person, place, and time. He appears well-developed. No distress.   Thin   HENT:   Head: Normocephalic and atraumatic.   Eyes: Pupils are equal, round, and reactive to light. EOM are normal.   Neck: Normal range of motion. No tracheal deviation present. No thyromegaly present.   Cardiovascular: Normal rate and normal heart sounds.   No murmur heard.  Pulmonary/Chest: Effort normal and breath sounds normal. No respiratory distress. He has no rales.   Abdominal: Bowel sounds are normal. He exhibits no distension.   Musculoskeletal: Normal range of motion. He exhibits no edema.   Neurological: He is alert and oriented to person, place, and time. No cranial nerve deficit.   Skin: He is not diaphoretic.   Nursing note and vitals reviewed.      Significant Labs:   CBC:   Recent Labs   Lab 04/04/20 2127 04/05/20  0445   WBC 8.94 8.70   HGB 8.1* 8.1*   HCT 26.9* 26.9*   * 657*     CMP:   Recent Labs   Lab 04/05/20  0445   *   K 4.2   CL 99   CO2 29   *   BUN 16   CREATININE 1.0   CALCIUM 7.8*   PROT 7.1   ALBUMIN 1.6*   BILITOT 0.2   ALKPHOS 78   AST 22   ALT 13   ANIONGAP 7*   EGFRNONAA >60.0       Significant Imaging: I have reviewed and interpreted all pertinent imaging results/findings within the past 24 hours.      Assessment/Plan:      * Acute respiratory failure with hypoxia  55 yo male with IVDU with Hep C, hx of MRSA (V sens), Pancreatitis, DM2, who presents as a transfer from [facility] [place] for SOB and CP, CT + multifocal apical cavitations and opacities, assoc. w/ air +fluid, suspected due to MRSA pneumonia c/b parapneumonic empyema and possible septic  embolization. covid -, s/d 3/28   Chest tube placed 3/30 qhs on TPA x 6 doses (2/6 doses on 3/30 and 3/29). CTS no intervention for now. TTE negative.   Remains stable on vanc and zosyn, cultures remain clear. Disposition- unable to place to  due to hx of IVDU. Patient does not desire outside facility transfer.     Plan:  - Recent Blood cultures remain negative, WBC count now coming down.    - Continue TB rule out, pending AFB samples. No acid fast bacilli on gram stain.   - Follow up with ID. Continue broad spectrum therapy, on vanc and zosyn. STOP ceftriaxone on 4/3, per ID, lactobacillus in pleural fluid can be resistant, also with E Coli/Klebsiella.  -plans to discharge with IV vanc + cefpedoxime or augmentin for 4-6 weeks total. If Repeat imaging in 4 weeks. May require doxycyline following re imaging if worsening.   - If patient leaves AMA, can offer linezolid, but would be suboptimal treatment.     - CTS consulted, unlikely to perform intervention, has had good output and decrease on O2 requirements  - TPA per pulmonology, received 5/5 doses with good output in chest tube and improvement in respiratory status. Will pull chest tube today pulmonary team is able.             Septic embolism  TTE negative  Neuro checks q4h.       Parapneumonic effusion  See above       Hyponatremia  Improved with blood sugar control and fluid hydration  component of decreased effective art volume    Continue trend     Reactive thrombocytosis  Likely 2/2 infection  Or possible bleeding, please see anemia       Iron deficiency anemia        Opioid abuse        Tobacco abuse        Chronic hepatitis C        Anemia of chronic disease        Intravenous drug abuse    -counseled on cessation      Hyperlipidemia  - continue statin       Normocytic normochromic anemia  Hb 6.5 on 4/4/2020 s/p transfusion x 1 prbc  Stable with appropriate response Hb.   No active bleeding  Type/cross  Iron studies consistent with Iron deficiency and  ACD        Essential hypertension  Stable, not on meds       Type 2 diabetes mellitus, uncontrolled  -HbA1c 11  - Insulin adjusted 14 long BID, 7 short TID          Opioid dependence, continuous          VTE Risk Mitigation (From admission, onward)         Ordered     enoxaparin injection 40 mg  Daily      04/04/20 0800     IP VTE HIGH RISK PATIENT  Once      03/27/20 1511                      Matthew Blackwell MD  Department of Hospital Medicine   [facility]

## 2020-04-06 NOTE — SUBJECTIVE & OBJECTIVE
Interval History: 04/06/2020 JOSE E. Has some mild right shoulder pain. No other pain. No SOB.     Review of Systems   Constitutional: Positive for fatigue. Negative for fever.   Respiratory: Negative for cough (Denies) and shortness of breath.    Cardiovascular: Negative for chest pain (Pleuritic) and leg swelling.   Gastrointestinal: Negative for abdominal pain, diarrhea, nausea and vomiting.   Genitourinary: Negative for dysuria.     Objective:     Vital Signs (Most Recent):  Temp: 99.3 °F (37.4 °C) (04/05/20 2030)  Pulse: 85 (04/06/20 0844)  Resp: 17 (04/06/20 0844)  BP: 110/64 (04/06/20 0844)  SpO2: (!) 90 % (04/06/20 0844) Vital Signs (24h Range):  Temp:  [99.3 °F (37.4 °C)] 99.3 °F (37.4 °C)  Pulse:  [85-86] 85  Resp:  [16-17] 17  SpO2:  [90 %-96 %] 90 %  BP: (110-135)/(64-72) 110/64     Weight: 59.5 kg (131 lb 2.8 oz)  Body mass index is 19.94 kg/m².    Intake/Output Summary (Last 24 hours) at 4/6/2020 1016  Last data filed at 4/6/2020 0527  Gross per 24 hour   Intake 600 ml   Output 2050 ml   Net -1450 ml      Physical Exam   Constitutional: He is oriented to person, place, and time. He appears well-developed. No distress.   Thin   HENT:   Head: Normocephalic and atraumatic.   Eyes: Pupils are equal, round, and reactive to light. EOM are normal.   Neck: Normal range of motion. No tracheal deviation present. No thyromegaly present.   Cardiovascular: Normal rate and normal heart sounds.   No murmur heard.  Pulmonary/Chest: Effort normal and breath sounds normal. No respiratory distress. He has no rales.   Abdominal: Bowel sounds are normal. He exhibits no distension.   Musculoskeletal: Normal range of motion. He exhibits no edema.   Neurological: He is alert and oriented to person, place, and time. No cranial nerve deficit.   Skin: He is not diaphoretic.   Nursing note and vitals reviewed.      Significant Labs:   CBC:   Recent Labs   Lab 04/04/20  2127 04/05/20  0445   WBC 8.94 8.70   HGB 8.1* 8.1*   HCT  26.9* 26.9*   * 657*     CMP:   Recent Labs   Lab 04/05/20  0445   *   K 4.2   CL 99   CO2 29   *   BUN 16   CREATININE 1.0   CALCIUM 7.8*   PROT 7.1   ALBUMIN 1.6*   BILITOT 0.2   ALKPHOS 78   AST 22   ALT 13   ANIONGAP 7*   EGFRNONAA >60.0       Significant Imaging: I have reviewed and interpreted all pertinent imaging results/findings within the past 24 hours.

## 2020-04-06 NOTE — PLAN OF CARE
04/06/20 0925   Discharge Reassessment   Assessment Type Discharge Planning Reassessment   Provided patient/caregiver education on the expected discharge date and the discharge plan Yes   Do you have any problems affording any of your prescribed medications? TBD   Discharge Plan A Home;Home with family   Discharge Plan B Home with family;Home   DME Needed Upon Discharge  none   Anticipated Discharge Disposition Home     Julie Haase RN  Case Management [phone]

## 2020-04-06 NOTE — PLAN OF CARE
Patient remains free of falls or injury. Patient complains of pain; treated with PO oxycodone and tylenol. Continued on zosyn q8 hours and vancomycin q12 hours; vanc trough 20.0 on 4/5/2020. COVID-19 test (-). Patient currently being ruled out for TB; airborne precautions maintained. Patient refusing for vitals to be taken q4 hours. Plan of care reviewed with patient.

## 2020-04-06 NOTE — PROGRESS NOTES
Patient states at this time he does not wish for vital signs to be obtained q4 hours. Patient states that he does not wish to be woken up or disturbed until after shift change. Will complete rounds.

## 2020-04-06 NOTE — PROGRESS NOTES
"Patient refused morning temperature check. Patient stated "I don't want that thing in my mouth". Will continue to monitor.   "

## 2020-04-06 NOTE — PROGRESS NOTES
Pharmacokinetic Assessment Follow Up: IV Vancomycin    Vancomycin serum concentration assessment(s):    · The trough level of 20.0 mcg/mL was drawn correctly and can be used to guide therapy at this time. The measurement is within the desired definitive target range of 15 to 20 mcg/mL.  · Renal function stable and adequate UOP.    Vancomycin Regimen Plan:    · Continue regimen with Vancomycin 750 mg IV every 12 hours with next serum trough concentration measured at 0800 prior to the fourth dose on 4/7.    Drug levels (last 3 results):  Recent Labs   Lab Result Units 04/03/20  1319 04/05/20  1955   Vancomycin-Trough ug/mL 24.3* 20.0       Pharmacy will continue to follow and monitor vancomycin.    Please contact pharmacy at extension [phone] for questions regarding this assessment.    Thank you for the consult,   Sofy Olea       Patient brief summary:  Cali Mabry is a 54 y.o. male initiated on antimicrobial therapy with IV Vancomycin for treatment of Septic Emboli.    The patient's current regimen is 750 mg every 12 hours.    Drug Allergies:   Review of patient's allergies indicates:  No Known Allergies    Actual Body Weight:   59.5 kg    Renal Function:   Estimated Creatinine Clearance: 71.9 mL/min (based on SCr of 1 mg/dL).,     Dialysis Method (if applicable):  N/A    CBC (last 72 hours):  Recent Labs   Lab Result Units 04/03/20  0521 04/03/20  1319 04/04/20  0453 04/04/20 2127 04/05/20  0445   WBC K/uL 10.32 9.51 8.26 8.94 8.70   Hemoglobin g/dL 7.0* 7.1* 6.5* 8.1* 8.1*   Hematocrit % 23.9* 24.0* 22.1* 26.9* 26.9*   Platelets K/uL 701* 674* 746* 659* 657*   Gran% % 61.7 76.0* 74.0* 56.0 78.0*   Lymph% % 22.3 15.0* 23.0 28.0 15.0*   Mono% % 9.3 4.0 1.0* 10.0 4.0   Eosinophil% % 1.5 4.0 0.0 1.0 1.0   Basophil% % 0.4 1.0 0.0 0.0 0.0   Differential Method  Automated Manual Manual Manual Manual       Metabolic Panel (last 72 hours):  Recent Labs   Lab Result Units 04/03/20  0748 04/03/20  1807 04/04/20  0400  04/05/20  0445   Sodium mmol/L 131* 131* 134* 135*   Potassium mmol/L 3.7 4.0 4.1 4.2   Chloride mmol/L 97 96 97 99   CO2 mmol/L 24 25 29 29   Glucose mg/dL 124* 295* 182* 252*   BUN, Bld mg/dL 15 17 15 16   Creatinine mg/dL 0.9 1.1 1.1 1.0   Albumin g/dL  --   --   --  1.6*   Total Bilirubin mg/dL  --   --   --  0.2   Alkaline Phosphatase U/L  --   --   --  78   AST U/L  --   --   --  22   ALT U/L  --   --   --  13   Magnesium mg/dL  --   --   --  2.1   Phosphorus mg/dL  --   --   --  2.5*       Vancomycin Administrations:  vancomycin given in the last 96 hours                   vancomycin 750 mg in dextrose 5 % 250 mL IVPB (ready to mix system) (mg) 750 mg New Bag 04/05/20 2037     750 mg New Bag  0839     750 mg New Bag 04/04/20 2020    vancomycin 750 mg in normal saline 250 mL IVPB (ready to mix system) (mg) 750 mg New Bag 04/04/20 0520                Microbiologic Results:  Microbiology Results (last 7 days)     Procedure Component Value Units Date/Time    Blood culture [[id number]] Collected:  04/01/20 1328    Order Status:  Completed Specimen:  Blood Updated:  04/05/20 1554     Blood Culture, Routine No growth after 4 days    Narrative:       Aerobic and anaerobic from site 2    Blood culture [[id number]] Collected:  04/01/20 1328    Order Status:  Completed Specimen:  Blood Updated:  04/05/20 1553     Blood Culture, Routine No growth after 4 days    Narrative:       Aerobic and anaerobic from site 1    Blood culture [[id number]] Collected:  03/29/20 1535    Order Status:  Completed Specimen:  Blood Updated:  04/02/20 1812     Blood Culture, Routine No Growth after 4 days.     Narrative:       Collection has been rescheduled by LW1 at 03/29/2020 12:34 Reason:   Patient Refused wants to be cleaned first before labs are drawn tech   will come back in 30 mins spoke with nurse Sharonda  Collection has been rescheduled by LW1 at 03/29/2020 13:34 Reason:   Unable to collect pt wants tech to come back when he's done  eating  Collection has been rescheduled by LW1 at 03/29/2020 12:34 Reason:   Patient Refused wants to be cleaned first before labs are drawn tech   will come back in 30 mins spoke with nurse Sharonda  Collection has been rescheduled by LW1 at 03/29/2020 13:34 Reason:   Unable to collect pt wants tech to come back when he's done eating    Blood culture [[id number]] Collected:  03/29/20 1535    Order Status:  Completed Specimen:  Blood Updated:  04/02/20 1812     Blood Culture, Routine No Growth after 4 days.     Narrative:       Collection has been rescheduled by LW1 at 03/29/2020 12:34 Reason:   Patient Refused wants to be cleaned first before labs are drawn tech   will come back in 30 mins spoke with nurse Sharonda  Collection has been rescheduled by LW1 at 03/29/2020 13:34 Reason:   Unable to collect pt wants tech to come back when he's done eating  Collection has been rescheduled by LW1 at 03/29/2020 12:34 Reason:   Patient Refused wants to be cleaned first before labs are drawn tech   will come back in 30 mins spoke with nurse Sharonda  Collection has been rescheduled by LW1 at 03/29/2020 13:34 Reason:   Unable to collect pt wants tech to come back when he's done eating    Fungus culture [[id number]] Collected:  03/29/20 2137    Order Status:  Completed Specimen:  Body Fluid from Pleural Fluid Updated:  04/02/20 0937     Fungus (Mycology) Culture Culture in progress    AFB Culture & Smear [[id number]]     Order Status:  Canceled Specimen:  Respiratory from Sputum, Expectorated     AFB Culture & Smear [[id number]] Collected:  03/30/20 1221    Order Status:  Completed Specimen:  Respiratory from Sputum, Expectorated Updated:  04/01/20 2127     AFB Culture & Smear Culture in progress     AFB CULTURE STAIN No acid fast bacilli seen.    Narrative:       Sputum for AFB x3.  One early morning sample    Culture, Respiratory with Gram Stain [[id number]]  (Abnormal)  (Susceptibility) Collected:  03/29/20 1843    Order  Status:  Completed Specimen:  Sputum, Expectorated Updated:  04/01/20 1018     Respiratory Culture No Pseudomonas isolated.      METHICILLIN RESISTANT STAPHYLOCOCCUS AUREUS  Few        KLEBSIELLA PNEUMONIAE  Rare  Normal respiratory ella also present       Gram Stain (Respiratory) <10 epithelial cells per low power field.     Gram Stain (Respiratory) Many WBC's     Gram Stain (Respiratory) Rare Gram positive rods     Gram Stain (Respiratory) Rare Gram negative rods    AFB Culture & Smear [[id number]]     Order Status:  Canceled Specimen:  Respiratory from Sputum, Expectorated     Aerobic culture [[id number]]  (Abnormal) Collected:  03/29/20 2137    Order Status:  Completed Specimen:  Body Fluid from Pleural Fluid Updated:  04/01/20 0915     Aerobic Bacterial Culture LACTOBACILLUS SPECIES  Few  Further identified as Lactobacillus rhamnosus      AFB Culture & Smear [[id number]]     Order Status:  Canceled Specimen:  Respiratory from Sputum, Expectorated     AFB Culture & Smear [[id number]]     Order Status:  Canceled Specimen:  Respiratory from Sputum, Expectorated     AFB Culture & Smear [[id number]] Collected:  03/30/20 1213    Order Status:  Completed Specimen:  Respiratory from Sputum, Expectorated Updated:  03/31/20 2127     AFB Culture & Smear Culture in progress     AFB CULTURE STAIN No acid fast bacilli seen.    Narrative:       Sputum for AFB x3.  One early morning sample    AFB Culture & Smear [[id number]] Collected:  03/31/20 1155    Order Status:  Sent Specimen:  Respiratory from Sputum, Expectorated Updated:  03/31/20 1156    Respiratory Viral Panel by PCR [facility]; Nasal Swab [[id number]]  (Abnormal) Collected:  03/28/20 1252    Order Status:  Completed Specimen:  Respiratory Updated:  03/31/20 1055     Respiratory Virus Panel, source Nasal Swab     RVP - Adenovirus Not Detected     Comment: Detects Serotypes B and E. Detection of Serotype C may   be limited. If Adenovirus infection is suspected and a   Not  Detected result is returned the sample should be   re-tested for Adenovirus using an independent method  (e.g. [employer] Adenovirus Quantitative Real-Time  PCR test.          Enterovirus Positive     Comment: Cross-reactivity has been observed between certain Rhinovirus  strains and the Enterovirus assay.          Human Bocavirus Not Detected     Human Coronavirus, Common Cold Virus Not Detected     Comment: The Human Coronavirus assay detects Human coronavirus types  229E, OC43,NL63 and HKU1.          RVP - Human Metapneumovirus (hMPV) Not Detected     RVP - Influenza A Not Detected     Influenza A - O6E1-01 Not Detected     RVP - Influenza B Not Detected     Parainfluenza Not Detected     Respiratory Syncytial VirusVirus (RSV) A Not Detected     Comment: The Respiratory Syncytial Viral assay detects types A and B,  however it does not distinguish between the two.          RVP - Rhinovirus Positive     Comment: Cross-Reactivity has been observed between certain   Rhinovirus strains and the Enterovirus assay.  Target Enriched Mulitplex Polymerase Chain Reaction (TEM-PCR)  allows for the detection of multiple pathogens out of a single  reaction.  This test was developed and its performance   characteristics determined by [employer].  It has not   been cleared or approved by the [place].Food and Drug Administration.  Results should be used in conjunction with clinical findings,   and should not form the sole basis for a diagnosis or treatment  decision.  TEM-PCR is a licensed technology of [employer].         Narrative:       Receiving Lab:->[facility]    AFB Culture & Smear [[id number]]     Order Status:  Canceled Specimen:  Respiratory from Sputum, Expectorated     AFB culture (includes stain) [[id number]] Collected:  03/29/20 2137    Order Status:  Completed Specimen:  Body Fluid from Pleural Fluid Updated:  03/31/20 0927     AFB Culture & Smear Culture in progress     AFB CULTURE STAIN No acid  fast bacilli seen.    Blood culture x two cultures. Draw prior to antibiotics. [[id number]] Collected:  03/26/20 2157    Order Status:  Completed Specimen:  Blood from Peripheral, Hand, Left Updated:  03/30/20 2303     Blood Culture, Routine No Growth after 4 days.     Narrative:       Aerobic and anaerobic    Blood culture x two cultures. Draw prior to antibiotics. [[id number]] Collected:  03/26/20 2157    Order Status:  Completed Specimen:  Blood from Peripheral, Hand, Left Updated:  03/30/20 2303     Blood Culture, Routine No Growth after 4 days.     Narrative:       Aerobic and anaerobic    AFB Culture & Smear [[id number]]     Order Status:  Canceled Specimen:  Respiratory from Sputum, Expectorated     AFB Culture & Smear [[id number]] Collected:  03/29/20 1844    Order Status:  Completed Specimen:  Sputum, Expectorated Updated:  03/30/20 2127     AFB Culture & Smear Culture in progress     AFB CULTURE STAIN No acid fast bacilli seen.    AFB Culture & Smear [[id number]]     Order Status:  Canceled Specimen:  Respiratory from Sputum, Expectorated     Gram stain [[id number]] Collected:  03/29/20 2137    Order Status:  Completed Specimen:  Body Fluid from Pleural Fluid Updated:  03/30/20 0038     Gram Stain Result Moderate WBC's      No organisms seen    AFB Culture & Smear [[id number]]     Order Status:  Canceled Specimen:  Respiratory from Sputum, Expectorated

## 2020-04-07 LAB
ALBUMIN SERPL BCP-MCNC: 1.9 G/DL (ref 3.5–5.2)
ALP SERPL-CCNC: 77 U/L (ref 55–135)
ALT SERPL W/O P-5'-P-CCNC: 20 U/L (ref 10–44)
ANION GAP SERPL CALC-SCNC: 10 MMOL/L (ref 8–16)
AST SERPL-CCNC: 26 U/L (ref 10–40)
BASOPHILS # BLD AUTO: 0.03 K/UL (ref 0–0.2)
BASOPHILS NFR BLD: 0.4 % (ref 0–1.9)
BILIRUB SERPL-MCNC: 0.3 MG/DL (ref 0.1–1)
BUN SERPL-MCNC: 17 MG/DL (ref 6–20)
CALCIUM SERPL-MCNC: 8.7 MG/DL (ref 8.7–10.5)
CHLORIDE SERPL-SCNC: 93 MMOL/L (ref 95–110)
CO2 SERPL-SCNC: 28 MMOL/L (ref 23–29)
CREAT SERPL-MCNC: 1.2 MG/DL (ref 0.5–1.4)
DIFFERENTIAL METHOD: ABNORMAL
EOSINOPHIL # BLD AUTO: 0.1 K/UL (ref 0–0.5)
EOSINOPHIL NFR BLD: 1.2 % (ref 0–8)
ERYTHROCYTE [DISTWIDTH] IN BLOOD BY AUTOMATED COUNT: 17.4 % (ref 11.5–14.5)
EST. GFR  (AFRICAN AMERICAN): >60 ML/MIN/1.73 M^2
EST. GFR  (NON AFRICAN AMERICAN): >60 ML/MIN/1.73 M^2
GLUCOSE SERPL-MCNC: 301 MG/DL (ref 70–110)
HCT VFR BLD AUTO: 27.9 % (ref 40–54)
HGB BLD-MCNC: 8.4 G/DL (ref 14–18)
IMM GRANULOCYTES # BLD AUTO: 0.24 K/UL (ref 0–0.04)
IMM GRANULOCYTES NFR BLD AUTO: 2.9 % (ref 0–0.5)
LYMPHOCYTES # BLD AUTO: 1.7 K/UL (ref 1–4.8)
LYMPHOCYTES NFR BLD: 20.8 % (ref 18–48)
MAGNESIUM SERPL-MCNC: 2.1 MG/DL (ref 1.6–2.6)
MCH RBC QN AUTO: 25.7 PG (ref 27–31)
MCHC RBC AUTO-ENTMCNC: 30.1 G/DL (ref 32–36)
MCV RBC AUTO: 85 FL (ref 82–98)
MONOCYTES # BLD AUTO: 0.9 K/UL (ref 0.3–1)
MONOCYTES NFR BLD: 10.9 % (ref 4–15)
NEUTROPHILS # BLD AUTO: 5.3 K/UL (ref 1.8–7.7)
NEUTROPHILS NFR BLD: 63.8 % (ref 38–73)
NRBC BLD-RTO: 0 /100 WBC
PHOSPHATE SERPL-MCNC: 3.6 MG/DL (ref 2.7–4.5)
PLATELET # BLD AUTO: 645 K/UL (ref 150–350)
PMV BLD AUTO: 8.9 FL (ref 9.2–12.9)
POCT GLUCOSE: 190 MG/DL (ref 70–110)
POCT GLUCOSE: 201 MG/DL (ref 70–110)
POCT GLUCOSE: 227 MG/DL (ref 70–110)
POCT GLUCOSE: 300 MG/DL (ref 70–110)
POCT GLUCOSE: 349 MG/DL (ref 70–110)
POCT GLUCOSE: 351 MG/DL (ref 70–110)
POCT GLUCOSE: >500 MG/DL (ref 70–110)
POTASSIUM SERPL-SCNC: 4.1 MMOL/L (ref 3.5–5.1)
PROT SERPL-MCNC: 7.7 G/DL (ref 6–8.4)
RBC # BLD AUTO: 3.27 M/UL (ref 4.6–6.2)
SODIUM SERPL-SCNC: 131 MMOL/L (ref 136–145)
WBC # BLD AUTO: 8.36 K/UL (ref 3.9–12.7)

## 2020-04-07 PROCEDURE — 20600001 HC STEP DOWN PRIVATE ROOM

## 2020-04-07 PROCEDURE — 25000003 PHARM REV CODE 250: Performed by: STUDENT IN AN ORGANIZED HEALTH CARE EDUCATION/TRAINING PROGRAM

## 2020-04-07 PROCEDURE — C9399 UNCLASSIFIED DRUGS OR BIOLOG: HCPCS | Performed by: STUDENT IN AN ORGANIZED HEALTH CARE EDUCATION/TRAINING PROGRAM

## 2020-04-07 PROCEDURE — 63600175 PHARM REV CODE 636 W HCPCS: Performed by: STUDENT IN AN ORGANIZED HEALTH CARE EDUCATION/TRAINING PROGRAM

## 2020-04-07 PROCEDURE — 84100 ASSAY OF PHOSPHORUS: CPT

## 2020-04-07 PROCEDURE — 99233 PR SUBSEQUENT HOSPITAL CARE,LEVL III: ICD-10-PCS | Mod: GC,,, | Performed by: STUDENT IN AN ORGANIZED HEALTH CARE EDUCATION/TRAINING PROGRAM

## 2020-04-07 PROCEDURE — 25000003 PHARM REV CODE 250: Performed by: PHYSICIAN ASSISTANT

## 2020-04-07 PROCEDURE — 36415 COLL VENOUS BLD VENIPUNCTURE: CPT

## 2020-04-07 PROCEDURE — 80053 COMPREHEN METABOLIC PANEL: CPT

## 2020-04-07 PROCEDURE — 85025 COMPLETE CBC W/AUTO DIFF WBC: CPT

## 2020-04-07 PROCEDURE — 97530 THERAPEUTIC ACTIVITIES: CPT

## 2020-04-07 PROCEDURE — 99233 SBSQ HOSP IP/OBS HIGH 50: CPT | Mod: GC,,, | Performed by: STUDENT IN AN ORGANIZED HEALTH CARE EDUCATION/TRAINING PROGRAM

## 2020-04-07 PROCEDURE — 25000003 PHARM REV CODE 250: Performed by: INTERNAL MEDICINE

## 2020-04-07 PROCEDURE — 83735 ASSAY OF MAGNESIUM: CPT

## 2020-04-07 RX ADMIN — INSULIN DETEMIR 16 UNITS: 100 INJECTION, SOLUTION SUBCUTANEOUS at 08:04

## 2020-04-07 RX ADMIN — OXYCODONE HYDROCHLORIDE 10 MG: 10 TABLET ORAL at 04:04

## 2020-04-07 RX ADMIN — ACETAMINOPHEN 650 MG: 325 TABLET ORAL at 08:04

## 2020-04-07 RX ADMIN — INSULIN ASPART 7 UNITS: 100 INJECTION, SOLUTION INTRAVENOUS; SUBCUTANEOUS at 09:04

## 2020-04-07 RX ADMIN — INSULIN ASPART 7 UNITS: 100 INJECTION, SOLUTION INTRAVENOUS; SUBCUTANEOUS at 01:04

## 2020-04-07 RX ADMIN — INSULIN ASPART 4 UNITS: 100 INJECTION, SOLUTION INTRAVENOUS; SUBCUTANEOUS at 09:04

## 2020-04-07 RX ADMIN — INSULIN ASPART 7 UNITS: 100 INJECTION, SOLUTION INTRAVENOUS; SUBCUTANEOUS at 05:04

## 2020-04-07 RX ADMIN — OXYCODONE HYDROCHLORIDE 10 MG: 10 TABLET ORAL at 05:04

## 2020-04-07 RX ADMIN — Medication 1 TABLET: at 09:04

## 2020-04-07 RX ADMIN — OXYCODONE HYDROCHLORIDE 10 MG: 10 TABLET ORAL at 10:04

## 2020-04-07 RX ADMIN — LIDOCAINE 1 PATCH: 50 PATCH CUTANEOUS at 09:04

## 2020-04-07 RX ADMIN — VANCOMYCIN HYDROCHLORIDE 750 MG: 750 INJECTION, POWDER, LYOPHILIZED, FOR SOLUTION INTRAVENOUS at 08:04

## 2020-04-07 RX ADMIN — PIPERACILLIN AND TAZOBACTAM 4.5 G: 4; .5 INJECTION, POWDER, LYOPHILIZED, FOR SOLUTION INTRAVENOUS; PARENTERAL at 04:04

## 2020-04-07 RX ADMIN — INSULIN ASPART 2 UNITS: 100 INJECTION, SOLUTION INTRAVENOUS; SUBCUTANEOUS at 05:04

## 2020-04-07 RX ADMIN — ATORVASTATIN CALCIUM 20 MG: 20 TABLET, FILM COATED ORAL at 09:04

## 2020-04-07 RX ADMIN — PIPERACILLIN AND TAZOBACTAM 4.5 G: 4; .5 INJECTION, POWDER, LYOPHILIZED, FOR SOLUTION INTRAVENOUS; PARENTERAL at 05:04

## 2020-04-07 RX ADMIN — INSULIN ASPART 2 UNITS: 100 INJECTION, SOLUTION INTRAVENOUS; SUBCUTANEOUS at 01:04

## 2020-04-07 RX ADMIN — OXYCODONE HYDROCHLORIDE 10 MG: 10 TABLET ORAL at 01:04

## 2020-04-07 RX ADMIN — Medication 6 MG: at 08:04

## 2020-04-07 NOTE — PLAN OF CARE
Plan of Care:  Discharge Recommendation: Home, no PT needs.  Please continue Progressive Mobility Protocol as appropriate.  Appropriate transfer level with nursing staff: Bed <> Chair:  Stand Pivot with supervision with No Assistive Device.    Baylee Graham PT, DPT  2020  Pager: [phone]    Physical Therapy Treatment Goals:  Multidisciplinary Problems       Physical Therapy Goals          Problem: Physical Therapy Goal    Goal Priority Disciplines Outcome Goal Variances Interventions   Physical Therapy Goal     PT, PT/OT Ongoing, Progressing     Description:  Goals to be met by: 2020     Patient will increase functional independence with mobility by performin. Sit to stand transfer with [place]  2. Gait  x 100 feet with Supervision without AD.  3. Lower extremity exercise program x15 reps, with supervision, in order to increase LE strength and (I) with functional mobility.

## 2020-04-07 NOTE — SUBJECTIVE & OBJECTIVE
Interval History: NAEON. BG ran high in 400-500s overnight. BMP showed slight hyponatremia (likely due to hyperglycemia) but normal K+ and non-elevated HG. Patient reports shoulder pain but no chest pain, SOB. He states that he would like to take a shower.    Review of Systems   Constitutional: Positive for fatigue. Negative for fever.   HENT: Negative for congestion and sore throat.    Eyes: Negative for pain and visual disturbance.   Respiratory: Negative for cough (Denies) and shortness of breath.    Cardiovascular: Negative for chest pain and leg swelling.   Gastrointestinal: Negative for abdominal pain, diarrhea, nausea and vomiting.   Genitourinary: Negative for difficulty urinating and dysuria.   Musculoskeletal: Negative for back pain and neck pain.   Neurological: Negative for syncope and headaches.   Psychiatric/Behavioral: Negative for behavioral problems and confusion.     Objective:     Vital Signs (Most Recent):  Temp: 98.8 °F (37.1 °C) (04/07/20 0955)  Pulse: 89 (04/07/20 0955)  Resp: 18 (04/07/20 0955)  BP: (!) 100/57 (04/07/20 0955)  SpO2: (!) 90 % (04/07/20 0955) Vital Signs (24h Range):  Temp:  [98.3 °F (36.8 °C)-98.8 °F (37.1 °C)] 98.8 °F (37.1 °C)  Pulse:  [76-89] 89  Resp:  [16-18] 18  SpO2:  [90 %-99 %] 90 %  BP: (100-132)/(57-91) 100/57     Weight: 59.5 kg (131 lb 2.8 oz)  Body mass index is 19.94 kg/m².    Intake/Output Summary (Last 24 hours) at 4/7/2020 1215  Last data filed at 4/7/2020 0959  Gross per 24 hour   Intake 720 ml   Output 2000 ml   Net -1280 ml      Physical Exam   Constitutional: He is oriented to person, place, and time. He appears well-developed. No distress.   Thin   HENT:   Head: Normocephalic and atraumatic.   Eyes: Pupils are equal, round, and reactive to light. EOM are normal.   Neck: Normal range of motion. No tracheal deviation present. No thyromegaly present.   Cardiovascular: Normal rate and normal heart sounds.   No murmur heard.  Pulmonary/Chest: Effort normal  and breath sounds normal. No respiratory distress. He has no rales.   Abdominal: Bowel sounds are normal. He exhibits no distension.   Musculoskeletal: Normal range of motion. He exhibits no edema.   Neurological: He is alert and oriented to person, place, and time. No cranial nerve deficit.   Skin: He is not diaphoretic.   Nursing note and vitals reviewed.      Significant Labs:   CBC:   Recent Labs   Lab 04/07/20  0628   WBC 8.36   HGB 8.4*   HCT 27.9*   *     CMP:   Recent Labs   Lab 04/06/20  1827 04/07/20  0628   * 131*   K 5.0 4.1   CL 92* 93*   CO2 29 28   * 301*   BUN 21* 17   CREATININE 1.4 1.2   CALCIUM 8.3* 8.7   PROT  --  7.7   ALBUMIN  --  1.9*   BILITOT  --  0.3   ALKPHOS  --  77   AST  --  26   ALT  --  20   ANIONGAP 10 10   EGFRNONAA 57.0* >60.0       Significant Imaging: I have reviewed all pertinent imaging results/findings within the past 24 hours.

## 2020-04-07 NOTE — PROGRESS NOTES
Patient's  on recheck. Dr. Mercedes notified; no orders given at this time. Will continue to monitor.

## 2020-04-07 NOTE — PT/OT/SLP PROGRESS
Physical Therapy Treatment    Patient Name:  Cali Mabry   MRN:  [id number]  Admit Date: 3/26/2020  Admitting Diagnosis:  Acute respiratory failure with hypoxia   Length of Stay: 11 days  Recent Surgery: * No surgery found *      Recommendations:     Discharge Recommendations:  home   Discharge Equipment Recommendations: none   Barriers to discharge: Decreased caregiver support    Plan:     During this hospitalization, patient to be seen 2 x/week to address the listed problems via gait training, therapeutic activities, therapeutic exercises, neuromuscular re-education  · Plan of Care Expires:  20   Plan of Care Reviewed with: patient    Assessment:     Cali Mabry is a 54 y.o. male admitted with a medical diagnosis of Acute respiratory failure with hypoxia.   Patient is making progress towards goals, participating well in this session. Pt continues to require significant assist with decreased functional mobility due to significantly reduced cardiorespiratory endurance. Education was provided to patient regarding importance of continued participation in therapy, patient's progress and discharge disposition. Pt continues to benefit from a collaborative PT/OT/SLP program to improve quality of life and focus on recovery of impairments.     Problem List: impaired cardiopulmonary response to activity, impaired functional mobilty.  Rehab Prognosis: Good     GOALS:   Multidisciplinary Problems     Physical Therapy Goals        Problem: Physical Therapy Goal    Goal Priority Disciplines Outcome Goal Variances Interventions   Physical Therapy Goal     PT, PT/OT Ongoing, Progressing     Description:  Goals to be met by: 2020     Patient will increase functional independence with mobility by performin. Sit to stand transfer with [place]  2. Gait  x 100 feet with Supervision without AD.  3. Lower extremity exercise program x15 reps, with supervision, in order to increase LE strength and (I)  "with functional mobility.                        Subjective   Communicated with RN prior to session.  Patient found supine upon PT entry to room, agreeable to evaluation. Cali Mabry's alone present during session.    Patient/Family Comments/goals: "Can I get some ice? I want to  Pain/Comfort:  · Pain Rating 1: 0/10  · Pain Rating Post-Intervention 1: 0/10    Objective:   Patient found with: telemetry, peripheral IV   General Precautions: Standard, Cardiac fall, airborne   Orthopedic Precautions:N/A   Braces: N/A   Oxygen Device: Room Air  Vitals: BP (!) 100/57 (BP Location: Right arm, Patient Position: Sitting)   Pulse 89   Temp 98.8 °F (37.1 °C) (Oral)   Resp 18   Ht 5' 8" (1.727 m)   Wt 59.5 kg (131 lb 2.8 oz)   SpO2 (!) 90%   BMI 19.94 kg/m²     Outcome Measures:  AM-PAC 6 CLICK MOBILITY  Turning over in bed (including adjusting bedclothes, sheets and blankets)?: 3  Sitting down on and standing up from a chair with arms (e.g., wheelchair, bedside commode, etc.): 3  Moving from lying on back to sitting on the side of the bed?: 3  Moving to and from a bed to a chair (including a wheelchair)?: 3  Need to walk in hospital room?: 3  Climbing 3-5 steps with a railing?: 2  Basic Mobility Total Score: 17     Cognition:   · Alert and Cooperative  · AxOx4  · Command following: Follows multistep  commands  · Fluency: clear/fluent    Functional Mobility:  Additional staff present: N/A  Bed Mobility:    Supine to Sit: supervision; HOB elevated and from right side of bed   Scooting anteriorly to EOB to have both feet planted on floor: stand by assistance    Transfers:    Sit <> Stand Transfer: stand by assistance with no assistive device    Stand <> Sit Transfer: stand by assistance with no assistive device   o [id number] from EOB       Gait:  · Patient ambulated: 50ft within room   · Patient required: supervision  · Patient used: no assistive device  · Gait Pattern observed: reciprocal gait  · Gait " Deviation(s): decreased xiomara  · Impairments due to: decreased endurance    Therapeutic Activities & Exercises:   Education:  Patient provided with daily orientation and goals of this PT session. Patient agreed to participate in session. Patient aware of patient's deficits and therapy progression. They were educated to transfer to bedside chair/bedside commode/bathroom with RN/PCT present. Patient educated on Fall risk, home safety and Home exercise program by explanation. Patient was receptive to education and verbalizes understanding. Encouraged patient to perform daily exercises & mobility to increase endurance and decrease effects of bedrest.Time provided for therapeutic counseling and discussion of health disposition. All questions answered to patient's satisfaction, within scope of PT practice; voiced no other concerns. White board updated in patient's room, RN notified of session.    Patient left up in chair, with head in midline, neutral pelvis & heels floated for skin protection with all lines intact, call button in reach and RN notified  Time Tracking:     PT Received On: 04/07/20  PT Start Time: 1101     PT Stop Time: 1120  PT Total Time (min): 19 min     Billable Minutes:   · Therapeutic Activity 19    Treatment Type: Treatment  PT/PTA: PT       Baylee Graham PT, DPT  04/07/2020  Pager: [phone]

## 2020-04-07 NOTE — ASSESSMENT & PLAN NOTE
Improved with blood sugar control and fluid hydration. Likely related to hyperglycemia as corrected Na within normal.   Component of decreased effective art volume    Continue to trend

## 2020-04-07 NOTE — PLAN OF CARE
Patient remains free of falls or injury. Patient complains of pain; treated with PO oxycodone. Continued on zosyn q8 hours and vancomycin q12 hours; vanc trough 20.0 on 4/5/2020. COVID-19 test (-). Patient currently being ruled out for TB; airborne precautions maintained. Patient refusing for vitals to be taken q4 hours. Plan of care reviewed with patient.

## 2020-04-07 NOTE — PROGRESS NOTES
"[facility] Hospital Medicine  Progress Note    Patient Name: Cali Mabry  MRN: [id number]  Patient Class: IP- Inpatient   Admission Date: 3/26/2020  Length of Stay: 11 days  Attending Physician: Jered Diehl MD  Primary Care Provider: Primary Doctor     Hospital Medicine Team: [facility]OS[facility] MED 1 Ayanna Edward MD    Subjective:     Principal Problem:Acute respiratory failure with hypoxia    HPI:  Cali Mabry is a 54 y.o. with DM II, hepatitis C, HTN, polysubstance abuse/IVDU, pancreatitis who presents, after transfer from [facility], with a 2 day history of chest pain and shortness of breath.  Chest pain is centralized and just described as "painful." Pain aggravated with movement and deep breathing. He reports an intermittent cough that is non-productive. He denies sick contacts, fever, chills, sore throat. Upon questioning, patient stated "I have the virus so just treat me for it." Educated patient that testing is still in process. Inquired about IV drug use however he was unable to recall when he last used. Patient swatted examiner's hand after chest palpation, yelling "that hurts!" Patient became aggravated, reported his pain was now worse and requested pain medication. Per nurse, patient given PRN dose an hour prior. Patient encouraged to continue discussing his plan of care however stated, "I don't want to chat."   ED: Afebrile with leukocytosis. CTA chest from 3/26 with dense consolidative changes within the right lower lobe with internal cavitation containing air and fluid; Occlusion is seen of the right lower lobe bronchi which may be secondary to mucoid impaction or aspirated material; Additional multifocal opacities and cavitations.  Vancomycin and Zosyn administered. On 2L NC. VSS. Admitted to hospital medicine for further evaluation and COVID-19 rule out.        Overview/Hospital Course:  55 yo male with IVDU with Hep C, hx of MRSA (V sens), Pancreatitis, DM2, who " presents as a transfer from [facility] for SOB and CP, CT + multifocal apical cavitations and opacities, assoc. w/ air +fluid, suspected due to MRSA pneumonia c/b parapneumonic empyema and possible septic embolization. covid -, s/d 3/28   Chest tube placed 3/30 qhs on TPA x 6 doses (2/6 doses on 3/30 and 3/29). CTS no intervention for now. TTE negative.     04/01/2020 No acute events overnight. There was some concern with hemoptysis, however on discussion with RN and patient, there was no witnessed hemoptysis or active bleed or clots. There was a pink fluid visible in a container, and source is not clear. Patient received extra dose of 0.5 dilaudid x 2 on 3/31 and 4/1/20. He denies any difficulties breathing. He is resting comfortably in bed, watching tv, pain appears controlled.     04/02/2020 NAEON. No episodes of hemoptysis. 5/5 TPA doses given with good output. He denies chest pain. No SOB. No fevers.   04/03/2020 NAEON. No chest pain. No SOB. Eating well.   04/04/2020 NAEON. Feels well. Asking to take a shower today.   04/05/2020 NAEON. No pain. No SOB. No fevers. Having Bowel movements+  04/06/2020 NAEON. Has some mild right shoulder pain. No other pain. No SOB.   04/07/2020 NAEON. BG ran high in 400-500s overnight. BMP showed slight hyponatremia (likely due to hyperglycemia) but normal K+ and non-elevated HG.    Interval History: NAEON. BG ran high in 400-500s overnight. BMP showed slight hyponatremia (likely due to hyperglycemia) but normal K+ and non-elevated HG. Patient reports shoulder pain but no chest pain, SOB. He states that he would like to take a shower.    Review of Systems   Constitutional: Positive for fatigue. Negative for fever.   HENT: Negative for congestion and sore throat.    Eyes: Negative for pain and visual disturbance.   Respiratory: Negative for cough (Denies) and shortness of breath.    Cardiovascular: Negative for chest pain and leg swelling.   Gastrointestinal: Negative for abdominal  pain, diarrhea, nausea and vomiting.   Genitourinary: Negative for difficulty urinating and dysuria.   Musculoskeletal: Negative for back pain and neck pain.   Neurological: Negative for syncope and headaches.   Psychiatric/Behavioral: Negative for behavioral problems and confusion.     Objective:     Vital Signs (Most Recent):  Temp: 98.8 °F (37.1 °C) (04/07/20 0955)  Pulse: 89 (04/07/20 0955)  Resp: 18 (04/07/20 0955)  BP: (!) 100/57 (04/07/20 0955)  SpO2: (!) 90 % (04/07/20 0955) Vital Signs (24h Range):  Temp:  [98.3 °F (36.8 °C)-98.8 °F (37.1 °C)] 98.8 °F (37.1 °C)  Pulse:  [76-89] 89  Resp:  [16-18] 18  SpO2:  [90 %-99 %] 90 %  BP: (100-132)/(57-91) 100/57     Weight: 59.5 kg (131 lb 2.8 oz)  Body mass index is 19.94 kg/m².    Intake/Output Summary (Last 24 hours) at 4/7/2020 1215  Last data filed at 4/7/2020 0959  Gross per 24 hour   Intake 720 ml   Output 2000 ml   Net -1280 ml      Physical Exam   Constitutional: He is oriented to person, place, and time. He appears well-developed. No distress.   Thin   HENT:   Head: Normocephalic and atraumatic.   Eyes: Pupils are equal, round, and reactive to light. EOM are normal.   Neck: Normal range of motion. No tracheal deviation present. No thyromegaly present.   Cardiovascular: Normal rate and normal heart sounds.   No murmur heard.  Pulmonary/Chest: Effort normal and breath sounds normal. No respiratory distress. He has no rales.   Abdominal: Bowel sounds are normal. He exhibits no distension.   Musculoskeletal: Normal range of motion. He exhibits no edema.   Neurological: He is alert and oriented to person, place, and time. No cranial nerve deficit.   Skin: He is not diaphoretic.   Nursing note and vitals reviewed.      Significant Labs:   CBC:   Recent Labs   Lab 04/07/20  0628   WBC 8.36   HGB 8.4*   HCT 27.9*   *     CMP:   Recent Labs   Lab 04/06/20  1827 04/07/20  0628   * 131*   K 5.0 4.1   CL 92* 93*   CO2 29 28   * 301*   BUN 21* 17    CREATININE 1.4 1.2   CALCIUM 8.3* 8.7   PROT  --  7.7   ALBUMIN  --  1.9*   BILITOT  --  0.3   ALKPHOS  --  77   AST  --  26   ALT  --  20   ANIONGAP 10 10   EGFRNONAA 57.0* >60.0       Significant Imaging: I have reviewed all pertinent imaging results/findings within the past 24 hours.      Assessment/Plan:      * Acute respiratory failure with hypoxia  54M with IVDU with Hep C, hx of MRSA (V sens), Pancreatitis, DM2, who presents as a transfer from [facility] for multifocal apical cavitations and opacities, assoc. w/ air +fluid, suspected due to MRSA pneumonia c/b parapneumonic empyema and possible septic embolization. covid -, s/d 3/28   Remains stable on vanc and zosyn, cultures remain clear. Disposition- unable to place to  due to hx of IVDU. Patient does not desire outside facility transfer.     RESOLVED as patient is stable on RA.    Plan:  - Recent Blood cultures remain negative, WBC count now coming down.    - Continue TB rule out, pending AFB samples. No acid fast bacilli on gram stain.   - Follow up with ID: continue broad spectrum therapy, on vanc and zosyn. STOP ceftriaxone on 4/3, per ID, lactobacillus in pleural fluid can be resistant, also with E Coli/Klebsiella.  -plans to discharge with IV vanc + cefpedoxime or augmentin for 4-6 weeks total (end date 05/09/2020). Repeat imaging in 4 weeks and may require doxycyline following re imaging if worsening.   - If patient leaves AMA, can offer linezolid, but would be suboptimal treatment.   - CTS consulted, appreciate assistance: unlikely to perform intervention as he has had good output and decrease on O2 requirements. TTE negative.  - TPA per pulmonology, received 5/5 doses with good output in chest tube (placed 03/30) and improvement in respiratory status. CT pulled.    Reactive thrombocytosis  Likely 2/2 infection  Or possible bleeding, please see anemia   Trend CBC    Iron deficiency anemia        Parapneumonic effusion  See above       Septic  embolism  TTE negative  Neuro checks q4h.       Hyponatremia  Improved with blood sugar control and fluid hydration. Likely related to hyperglycemia as corrected Na within normal.   Component of decreased effective art volume    Continue to trend     Opioid abuse        Tobacco abuse        Chronic hepatitis C        Anemia of chronic disease        Intravenous drug abuse    -counseled on cessation      Hyperlipidemia  - continue statin       Normocytic normochromic anemia  Hb 6.5 on 4/4/2020 s/p transfusion x 1 prbc  Stable with appropriate response Hb.   No active bleeding  Type/cross  Iron studies consistent with Iron deficiency and ACD        Essential hypertension  Stable, not on meds       Type 2 diabetes mellitus, uncontrolled  - HgbA1c 11%  - Insulin adjusted to 16U levemir BID, 7U aspart TID   - LDSII         Opioid dependence, continuous          Patient seen, and case discussed with staff, Dr. Diehl. Attending attestation to follow.      VTE Risk Mitigation (From admission, onward)         Ordered     enoxaparin injection 40 mg  Daily      04/04/20 0800     IP VTE HIGH RISK PATIENT  Once      03/27/20 1511                  Ayanna Edward MD  Department of Hospital Medicine   [facility]

## 2020-04-07 NOTE — ASSESSMENT & PLAN NOTE
54M with IVDU with Hep C, hx of MRSA (V sens), Pancreatitis, DM2, who presents as a transfer from [facility] for multifocal apical cavitations and opacities, assoc. w/ air +fluid, suspected due to MRSA pneumonia c/b parapneumonic empyema and possible septic embolization. covid -, s/d 3/28   Remains stable on vanc and zosyn, cultures remain clear. Disposition- unable to place to  due to hx of IVDU. Patient does not desire outside facility transfer.     RESOLVED as patient is stable on RA.    Plan:  - Recent Blood cultures remain negative, WBC count now coming down.    - Continue TB rule out, pending AFB samples. No acid fast bacilli on gram stain.   - Follow up with ID: continue broad spectrum therapy, on vanc and zosyn. STOP ceftriaxone on 4/3, per ID, lactobacillus in pleural fluid can be resistant, also with E Coli/Klebsiella.  -plans to discharge with IV vanc + cefpedoxime or augmentin for 4-6 weeks total (end date 05/09/2020). Repeat imaging in 4 weeks and may require doxycyline following re imaging if worsening.   - If patient leaves AMA, can offer linezolid, but would be suboptimal treatment.   - CTS consulted, appreciate assistance: unlikely to perform intervention as he has had good output and decrease on O2 requirements. TTE negative.  - TPA per pulmonology, received 5/5 doses with good output in chest tube (placed 03/30) and improvement in respiratory status. CT pulled.

## 2020-04-08 LAB
POCT GLUCOSE: 176 MG/DL (ref 70–110)
POCT GLUCOSE: 272 MG/DL (ref 70–110)
POCT GLUCOSE: 395 MG/DL (ref 70–110)
POCT GLUCOSE: 471 MG/DL (ref 70–110)
VANCOMYCIN TROUGH SERPL-MCNC: 22 UG/ML (ref 10–22)

## 2020-04-08 PROCEDURE — C9399 UNCLASSIFIED DRUGS OR BIOLOG: HCPCS | Performed by: STUDENT IN AN ORGANIZED HEALTH CARE EDUCATION/TRAINING PROGRAM

## 2020-04-08 PROCEDURE — 63600175 PHARM REV CODE 636 W HCPCS: Performed by: STUDENT IN AN ORGANIZED HEALTH CARE EDUCATION/TRAINING PROGRAM

## 2020-04-08 PROCEDURE — 25000003 PHARM REV CODE 250: Performed by: STUDENT IN AN ORGANIZED HEALTH CARE EDUCATION/TRAINING PROGRAM

## 2020-04-08 PROCEDURE — 25000003 PHARM REV CODE 250: Performed by: INTERNAL MEDICINE

## 2020-04-08 PROCEDURE — 99233 PR SUBSEQUENT HOSPITAL CARE,LEVL III: ICD-10-PCS | Mod: GC,,, | Performed by: STUDENT IN AN ORGANIZED HEALTH CARE EDUCATION/TRAINING PROGRAM

## 2020-04-08 PROCEDURE — 25000003 PHARM REV CODE 250: Performed by: PHYSICIAN ASSISTANT

## 2020-04-08 PROCEDURE — 99233 SBSQ HOSP IP/OBS HIGH 50: CPT | Mod: GC,,, | Performed by: STUDENT IN AN ORGANIZED HEALTH CARE EDUCATION/TRAINING PROGRAM

## 2020-04-08 PROCEDURE — 20600001 HC STEP DOWN PRIVATE ROOM

## 2020-04-08 PROCEDURE — 80202 ASSAY OF VANCOMYCIN: CPT

## 2020-04-08 PROCEDURE — 36415 COLL VENOUS BLD VENIPUNCTURE: CPT

## 2020-04-08 RX ORDER — OXYCODONE HYDROCHLORIDE 5 MG/1
10 TABLET ORAL EVERY 6 HOURS PRN
Status: DISCONTINUED | OUTPATIENT
Start: 2020-04-08 | End: 2020-04-12

## 2020-04-08 RX ADMIN — VANCOMYCIN HYDROCHLORIDE 750 MG: 750 INJECTION, POWDER, LYOPHILIZED, FOR SOLUTION INTRAVENOUS at 08:04

## 2020-04-08 RX ADMIN — INSULIN ASPART 7 UNITS: 100 INJECTION, SOLUTION INTRAVENOUS; SUBCUTANEOUS at 11:04

## 2020-04-08 RX ADMIN — INSULIN ASPART 3 UNITS: 100 INJECTION, SOLUTION INTRAVENOUS; SUBCUTANEOUS at 04:04

## 2020-04-08 RX ADMIN — INSULIN ASPART 5 UNITS: 100 INJECTION, SOLUTION INTRAVENOUS; SUBCUTANEOUS at 07:04

## 2020-04-08 RX ADMIN — OXYCODONE HYDROCHLORIDE 10 MG: 5 TABLET ORAL at 01:04

## 2020-04-08 RX ADMIN — PIPERACILLIN AND TAZOBACTAM 4.5 G: 4; .5 INJECTION, POWDER, LYOPHILIZED, FOR SOLUTION INTRAVENOUS; PARENTERAL at 08:04

## 2020-04-08 RX ADMIN — Medication 1 TABLET: at 09:04

## 2020-04-08 RX ADMIN — OXYCODONE HYDROCHLORIDE 10 MG: 5 TABLET ORAL at 08:04

## 2020-04-08 RX ADMIN — Medication 6 MG: at 08:04

## 2020-04-08 RX ADMIN — ACETAMINOPHEN 650 MG: 325 TABLET ORAL at 09:04

## 2020-04-08 RX ADMIN — ATORVASTATIN CALCIUM 20 MG: 20 TABLET, FILM COATED ORAL at 09:04

## 2020-04-08 RX ADMIN — INSULIN DETEMIR 16 UNITS: 100 INJECTION, SOLUTION SUBCUTANEOUS at 07:04

## 2020-04-08 RX ADMIN — INSULIN ASPART 7 UNITS: 100 INJECTION, SOLUTION INTRAVENOUS; SUBCUTANEOUS at 04:04

## 2020-04-08 RX ADMIN — PIPERACILLIN AND TAZOBACTAM 4.5 G: 4; .5 INJECTION, POWDER, LYOPHILIZED, FOR SOLUTION INTRAVENOUS; PARENTERAL at 01:04

## 2020-04-08 RX ADMIN — INSULIN ASPART 5 UNITS: 100 INJECTION, SOLUTION INTRAVENOUS; SUBCUTANEOUS at 12:04

## 2020-04-08 RX ADMIN — OXYCODONE HYDROCHLORIDE 10 MG: 10 TABLET ORAL at 09:04

## 2020-04-08 RX ADMIN — INSULIN ASPART 7 UNITS: 100 INJECTION, SOLUTION INTRAVENOUS; SUBCUTANEOUS at 07:04

## 2020-04-08 RX ADMIN — PIPERACILLIN AND TAZOBACTAM 4.5 G: 4; .5 INJECTION, POWDER, LYOPHILIZED, FOR SOLUTION INTRAVENOUS; PARENTERAL at 05:04

## 2020-04-08 RX ADMIN — LIDOCAINE 1 PATCH: 50 PATCH CUTANEOUS at 09:04

## 2020-04-08 RX ADMIN — INSULIN DETEMIR 20 UNITS: 100 INJECTION, SOLUTION SUBCUTANEOUS at 08:04

## 2020-04-08 NOTE — SUBJECTIVE & OBJECTIVE
"Interval History: Patient was able to take a shower yesterday. This morning, he reports that he "wants to get out of here" and would like a cup of coffee. BG improving.    Review of Systems   Constitutional: Positive for fatigue. Negative for fever.   HENT: Negative for congestion and sore throat.    Eyes: Negative for pain and visual disturbance.   Respiratory: Negative for cough (Denies) and shortness of breath.    Cardiovascular: Negative for chest pain and leg swelling.   Gastrointestinal: Negative for abdominal pain, diarrhea, nausea and vomiting.   Genitourinary: Negative for difficulty urinating and dysuria.   Musculoskeletal: Negative for back pain and neck pain.   Neurological: Negative for syncope and headaches.   Psychiatric/Behavioral: Negative for behavioral problems and confusion.     Objective:     Vital Signs (Most Recent):  Temp: 98.7 °F (37.1 °C) (04/08/20 0547)  Pulse: 74 (04/08/20 0547)  Resp: 18 (04/08/20 1029)  BP: (!) 101/58 (04/08/20 1029)  SpO2: 99 % (04/08/20 1029) Vital Signs (24h Range):  Temp:  [98.7 °F (37.1 °C)-98.9 °F (37.2 °C)] 98.7 °F (37.1 °C)  Pulse:  [74-93] 74  Resp:  [17-18] 18  SpO2:  [93 %-99 %] 99 %  BP: ()/(50-66) 101/58     Weight: 59.5 kg (131 lb 2.8 oz)  Body mass index is 19.94 kg/m².    Intake/Output Summary (Last 24 hours) at 4/8/2020 1120  Last data filed at 4/8/2020 0745  Gross per 24 hour   Intake 0 ml   Output 1560 ml   Net -1560 ml      Physical Exam   Constitutional: He is oriented to person, place, and time. He appears well-developed. No distress.   Thin   HENT:   Head: Normocephalic and atraumatic.   Eyes: Pupils are equal, round, and reactive to light. EOM are normal.   Neck: Normal range of motion. No tracheal deviation present. No thyromegaly present.   Cardiovascular: Normal rate and normal heart sounds.   No murmur heard.  Pulmonary/Chest: Effort normal and breath sounds normal. No respiratory distress. He has no rales.   Abdominal: Bowel sounds are " normal. He exhibits no distension.   Musculoskeletal: Normal range of motion. He exhibits no edema.   Neurological: He is alert and oriented to person, place, and time. No cranial nerve deficit.   Skin: He is not diaphoretic.   Nursing note and vitals reviewed.      Significant Labs:   Q48H labs    CBC:   Recent Labs   Lab 04/07/20  0628   WBC 8.36   HGB 8.4*   HCT 27.9*   *     CMP:   Recent Labs   Lab 04/06/20  1827 04/07/20  0628   * 131*   K 5.0 4.1   CL 92* 93*   CO2 29 28   * 301*   BUN 21* 17   CREATININE 1.4 1.2   CALCIUM 8.3* 8.7   PROT  --  7.7   ALBUMIN  --  1.9*   BILITOT  --  0.3   ALKPHOS  --  77   AST  --  26   ALT  --  20   ANIONGAP 10 10   EGFRNONAA 57.0* >60.0       Significant Imaging: I have reviewed all pertinent imaging results/findings within the past 24 hours.

## 2020-04-08 NOTE — PLAN OF CARE
"Problem: Adult Inpatient Plan of Care  Goal: Plan of Care Review  LOC: alert and oriented x 4.   SKIN: The skin is warm, dry.   RESPIRATORY: Respirations are WNL, even and unlabored. Normal effort and rate noted. No accessory muscle use noted. Pt. On room air.  CARDIAC: Patient not on telemetry.  ABDOMEN: Soft and non tender to palpation. No distention noted.   URINARY: continent with urinal at bedside.  EXTREMITIES: Extremities are WNL; general weakness throughout.  Neuro: Pt able to follow commands and is calm and cooperative with care.      POC reviewed with patient. VSS. Patient free of injuries and falls. Patient has pain "all over" 8/10; gave PRN tylenol. Patient receiving vancomycin and zosyn for MRSA pneumonia / parapneumonic empyema. Waiting on results of TB test to rule out patient; airborne precautions maintained. BG at 2200 was 190; gave scheduled levemir and no sliding scale. Patient refusing heparin injection and requested his 1AM zosyn to be given at 6/7 AM because patient "did not want to be disturbed all night." All questions were addressed. WCTM.  "

## 2020-04-08 NOTE — PLAN OF CARE
LINN contacted Lyssa ([phone]) with [facility] to follow up on the referral sent out. Lyssa requested some updated documentation on pt. LINN faxed over the information and is waiting on a response regarding possible placement.    Vida Carney LMSW  [facility]  Ext. [phone]

## 2020-04-08 NOTE — PROGRESS NOTES
"[facility] Hospital Medicine  Progress Note    Patient Name: Cali Mabry  MRN: [id number]  Patient Class: IP- Inpatient   Admission Date: 3/26/2020  Length of Stay: 12 days  Attending Physician: Jered Diehl MD  Primary Care Provider: Primary Doctor     Hospital Medicine Team: [facility] [place] HOSP MED 1 Ayanna Edward MD    Subjective:     Principal Problem:Acute respiratory failure with hypoxia      HPI:  Cali Mabry is a 54 y.o. with DM II, hepatitis C, HTN, polysubstance abuse/IVDU, pancreatitis who [facility] with a 2 day history of chest pain and shortness of breath.  Chest pain is centralized and just described as "painful." Pain aggravated with movement and deep breathing. He reports an intermittent cough that is non-productive. He denies sick contacts, fever, chills, sore throat. Upon questioning, patient stated "I have the virus so just treat me for it." Educated patient that testing is still in process. Inquired about IV drug use however he was unable to recall when he last used. Patient swatted examiner's hand after chest palpation, yelling "that hurts!" Patient became aggravated, reported his pain was now worse and requested pain medication. Per nurse, patient given PRN dose an hour prior. Patient encouraged to continue discussing his plan of care however stated, "I don't want to chat."   ED: Afebrile with leukocytosis. CTA chest from 3/26 with dense consolidative changes within the right lower lobe with internal cavitation containing air and fluid; Occlusion is seen of the right lower lobe bronchi which may be secondary to mucoid impaction or aspirated material; Additional multifocal opacities and cavitations.  Vancomycin and Zosyn administered. On 2L NC. VSS. Admitted to hospital medicine for further evaluation and COVID-19 rule out.        Overview/Hospital Course:  55 yo male with IVDU with Hep C, hx of MRSA (V sens), Pancreatitis, DM2, who " "presents as a transfer from [facility] for SOB and CP, CT + multifocal apical cavitations and opacities, assoc. w/ air +fluid, suspected due to MRSA pneumonia c/b parapneumonic empyema and possible septic embolization. covid -, s/d 3/28. Chest tube placed 3/30 qhs on TPA x 6 doses (2/6 doses on 3/30 and 3/29). CTS no intervention for now. TTE negative. Chest tube was pulled. ID's final recommendations included: plans todischarge with IV vanc + cefpedoxime or augmentin for 4-6 weeks total (end date 05/09/2020). Repeat imaging in 4 weeks and may require doxycyline following re imaging if worsening. If patient leaves AMA, can offer linezolid, but that would be suboptimal treatment.     Throughout hospital stay, patient's insulin was titrated to maintain -180.    Interval History: Patient was able to take a shower yesterday. This morning, he reports that he "wants to get out of here" and would like a cup of coffee. BG improving.    Review of Systems   Constitutional: Positive for fatigue. Negative for fever.   HENT: Negative for congestion and sore throat.    Eyes: Negative for pain and visual disturbance.   Respiratory: Negative for cough (Denies) and shortness of breath.    Cardiovascular: Negative for chest pain and leg swelling.   Gastrointestinal: Negative for abdominal pain, diarrhea, nausea and vomiting.   Genitourinary: Negative for difficulty urinating and dysuria.   Musculoskeletal: Negative for back pain and neck pain.   Neurological: Negative for syncope and headaches.   Psychiatric/Behavioral: Negative for behavioral problems and confusion.     Objective:     Vital Signs (Most Recent):  Temp: 98.7 °F (37.1 °C) (04/08/20 0547)  Pulse: 74 (04/08/20 0547)  Resp: 18 (04/08/20 1029)  BP: (!) 101/58 (04/08/20 1029)  SpO2: 99 % (04/08/20 1029) Vital Signs (24h Range):  Temp:  [98.7 °F (37.1 °C)-98.9 °F (37.2 °C)] 98.7 °F (37.1 °C)  Pulse:  [74-93] 74  Resp:  [17-18] 18  SpO2:  [93 %-99 %] 99 %  BP: " ()/(50-66) 101/58     Weight: 59.5 kg (131 lb 2.8 oz)  Body mass index is 19.94 kg/m².    Intake/Output Summary (Last 24 hours) at 4/8/2020 1120  Last data filed at 4/8/2020 0745  Gross per 24 hour   Intake 0 ml   Output 1560 ml   Net -1560 ml      Physical Exam   Constitutional: He is oriented to person, place, and time. He appears well-developed. No distress.   Thin   HENT:   Head: Normocephalic and atraumatic.   Eyes: Pupils are equal, round, and reactive to light. EOM are normal.   Neck: Normal range of motion. No tracheal deviation present. No thyromegaly present.   Cardiovascular: Normal rate and normal heart sounds.   No murmur heard.  Pulmonary/Chest: Effort normal and breath sounds normal. No respiratory distress. He has no rales.   Abdominal: Bowel sounds are normal. He exhibits no distension.   Musculoskeletal: Normal range of motion. He exhibits no edema.   Neurological: He is alert and oriented to person, place, and time. No cranial nerve deficit.   Skin: He is not diaphoretic.   Nursing note and vitals reviewed.      Significant Labs:   Q48H labs    CBC:   Recent Labs   Lab 04/07/20  0628   WBC 8.36   HGB 8.4*   HCT 27.9*   *     CMP:   Recent Labs   Lab 04/06/20  1827 04/07/20  0628   * 131*   K 5.0 4.1   CL 92* 93*   CO2 29 28   * 301*   BUN 21* 17   CREATININE 1.4 1.2   CALCIUM 8.3* 8.7   PROT  --  7.7   ALBUMIN  --  1.9*   BILITOT  --  0.3   ALKPHOS  --  77   AST  --  26   ALT  --  20   ANIONGAP 10 10   EGFRNONAA 57.0* >60.0       Significant Imaging: I have reviewed all pertinent imaging results/findings within the past 24 hours.      Assessment/Plan:      * Acute respiratory failure with hypoxia  54M with IVDU with Hep C, hx of MRSA (V sens), Pancreatitis, DM2, who presents as a transfer from [facility] for multifocal apical cavitations and opacities, assoc. w/ air +fluid, suspected due to MRSA pneumonia c/b parapneumonic empyema and possible septic embolization. covid -,  s/d 3/28   Remains stable on vanc and zosyn, cultures remain clear. Disposition- unable to place to  due to hx of IVDU. Patient does not desire outside facility transfer.     RESOLVED as patient is stable on RA.    Plan:  - Recent Blood cultures remain negative, WBC count now coming down.    - Continue TB rule out, pending AFB samples. No acid fast bacilli on gram stain.   - Follow up with ID: continue broad spectrum therapy, on vanc and zosyn. STOP ceftriaxone on 4/3, per ID, lactobacillus in pleural fluid can be resistant, also with E Coli/Klebsiella.  -plans to discharge with IV vanc + cefpedoxime or augmentin for 4-6 weeks total (end date 05/09/2020). Repeat imaging in 4 weeks and may require doxycyline following re imaging if worsening.   - If patient leaves AMA, can offer linezolid, but would be suboptimal treatment.   - CTS consulted, appreciate assistance: unlikely to perform intervention as he has had good output and decrease on O2 requirements. TTE negative.  - TPA per pulmonology, received 5/5 doses with good output in chest tube (placed 03/30) and improvement in respiratory status. CT pulled.    Reactive thrombocytosis  Likely 2/2 infection  Or possible bleeding, please see anemia   Trend CBC    Iron deficiency anemia        Parapneumonic effusion  See above       Septic embolism  TTE negative  Neuro checks q4h.       Hyponatremia  Improved with blood sugar control and fluid hydration. Likely related to hyperglycemia as corrected Na within normal.   Component of decreased effective art volume    Continue to trend     Opioid abuse        Tobacco abuse        Chronic hepatitis C        Anemia of chronic disease        Intravenous drug abuse    -counseled on cessation      Hyperlipidemia  - continue statin       Normocytic normochromic anemia  Hb 6.5 on 4/4/2020 s/p transfusion x 1 prbc  Stable with appropriate response Hb.   No active bleeding  Type/cross  Iron studies consistent with Iron deficiency and  ACD        Essential hypertension  Stable, not on meds       Type 2 diabetes mellitus, uncontrolled  - HgbA1c 11%  - Insulin adjusted to 16U levemir BID, 7U aspart TID   - LDSII         Opioid dependence, continuous          Patient seen, and case discussed with staff, Dr. Diehl. Attending attestation to follow.    VTE Risk Mitigation (From admission, onward)         Ordered     enoxaparin injection 40 mg  Daily      04/04/20 0800     IP VTE HIGH RISK PATIENT  Once      03/27/20 5921                Ayanna Edward MD  Department of Hospital Medicine   [facility]

## 2020-04-09 LAB
ALBUMIN SERPL BCP-MCNC: 2.1 G/DL (ref 3.5–5.2)
ALP SERPL-CCNC: 74 U/L (ref 55–135)
ALT SERPL W/O P-5'-P-CCNC: 26 U/L (ref 10–44)
ANION GAP SERPL CALC-SCNC: 10 MMOL/L (ref 8–16)
AST SERPL-CCNC: 27 U/L (ref 10–40)
BASOPHILS # BLD AUTO: 0.04 K/UL (ref 0–0.2)
BASOPHILS NFR BLD: 0.5 % (ref 0–1.9)
BILIRUB SERPL-MCNC: 0.3 MG/DL (ref 0.1–1)
BUN SERPL-MCNC: 23 MG/DL (ref 6–20)
CALCIUM SERPL-MCNC: 8.7 MG/DL (ref 8.7–10.5)
CHLORIDE SERPL-SCNC: 98 MMOL/L (ref 95–110)
CO2 SERPL-SCNC: 27 MMOL/L (ref 23–29)
CREAT SERPL-MCNC: 1.2 MG/DL (ref 0.5–1.4)
DIFFERENTIAL METHOD: ABNORMAL
EOSINOPHIL # BLD AUTO: 0.1 K/UL (ref 0–0.5)
EOSINOPHIL NFR BLD: 1.1 % (ref 0–8)
ERYTHROCYTE [DISTWIDTH] IN BLOOD BY AUTOMATED COUNT: 17.2 % (ref 11.5–14.5)
EST. GFR  (AFRICAN AMERICAN): >60 ML/MIN/1.73 M^2
EST. GFR  (NON AFRICAN AMERICAN): >60 ML/MIN/1.73 M^2
GLUCOSE SERPL-MCNC: 185 MG/DL (ref 70–110)
HCT VFR BLD AUTO: 30.5 % (ref 40–54)
HGB BLD-MCNC: 8.9 G/DL (ref 14–18)
IMM GRANULOCYTES # BLD AUTO: 0.12 K/UL (ref 0–0.04)
IMM GRANULOCYTES NFR BLD AUTO: 1.6 % (ref 0–0.5)
LYMPHOCYTES # BLD AUTO: 2.4 K/UL (ref 1–4.8)
LYMPHOCYTES NFR BLD: 32.6 % (ref 18–48)
MAGNESIUM SERPL-MCNC: 2.2 MG/DL (ref 1.6–2.6)
MCH RBC QN AUTO: 25.2 PG (ref 27–31)
MCHC RBC AUTO-ENTMCNC: 29.2 G/DL (ref 32–36)
MCV RBC AUTO: 86 FL (ref 82–98)
MONOCYTES # BLD AUTO: 0.8 K/UL (ref 0.3–1)
MONOCYTES NFR BLD: 11.1 % (ref 4–15)
NEUTROPHILS # BLD AUTO: 3.9 K/UL (ref 1.8–7.7)
NEUTROPHILS NFR BLD: 53.1 % (ref 38–73)
NRBC BLD-RTO: 0 /100 WBC
PHOSPHATE SERPL-MCNC: 2.8 MG/DL (ref 2.7–4.5)
PLATELET # BLD AUTO: 568 K/UL (ref 150–350)
PMV BLD AUTO: 9 FL (ref 9.2–12.9)
POCT GLUCOSE: 159 MG/DL (ref 70–110)
POCT GLUCOSE: 169 MG/DL (ref 70–110)
POCT GLUCOSE: 220 MG/DL (ref 70–110)
POCT GLUCOSE: 225 MG/DL (ref 70–110)
POCT GLUCOSE: 323 MG/DL (ref 70–110)
POTASSIUM SERPL-SCNC: 3.9 MMOL/L (ref 3.5–5.1)
PROT SERPL-MCNC: 8.1 G/DL (ref 6–8.4)
RBC # BLD AUTO: 3.53 M/UL (ref 4.6–6.2)
SODIUM SERPL-SCNC: 135 MMOL/L (ref 136–145)
VANCOMYCIN SERPL-MCNC: 21.6 UG/ML
WBC # BLD AUTO: 7.39 K/UL (ref 3.9–12.7)

## 2020-04-09 PROCEDURE — 25000003 PHARM REV CODE 250: Performed by: STUDENT IN AN ORGANIZED HEALTH CARE EDUCATION/TRAINING PROGRAM

## 2020-04-09 PROCEDURE — 25000003 PHARM REV CODE 250: Performed by: PHYSICIAN ASSISTANT

## 2020-04-09 PROCEDURE — 63600175 PHARM REV CODE 636 W HCPCS

## 2020-04-09 PROCEDURE — 80202 ASSAY OF VANCOMYCIN: CPT

## 2020-04-09 PROCEDURE — 87181 SC STD AGAR DILUTION PER AGT: CPT

## 2020-04-09 PROCEDURE — 83735 ASSAY OF MAGNESIUM: CPT

## 2020-04-09 PROCEDURE — 80053 COMPREHEN METABOLIC PANEL: CPT

## 2020-04-09 PROCEDURE — 20600001 HC STEP DOWN PRIVATE ROOM

## 2020-04-09 PROCEDURE — 25000003 PHARM REV CODE 250: Performed by: INTERNAL MEDICINE

## 2020-04-09 PROCEDURE — 85025 COMPLETE CBC W/AUTO DIFF WBC: CPT

## 2020-04-09 PROCEDURE — 99233 PR SUBSEQUENT HOSPITAL CARE,LEVL III: ICD-10-PCS | Mod: GC,,,

## 2020-04-09 PROCEDURE — 36415 COLL VENOUS BLD VENIPUNCTURE: CPT

## 2020-04-09 PROCEDURE — 99233 SBSQ HOSP IP/OBS HIGH 50: CPT | Mod: GC,,,

## 2020-04-09 PROCEDURE — 84100 ASSAY OF PHOSPHORUS: CPT

## 2020-04-09 PROCEDURE — 63600175 PHARM REV CODE 636 W HCPCS: Performed by: STUDENT IN AN ORGANIZED HEALTH CARE EDUCATION/TRAINING PROGRAM

## 2020-04-09 RX ORDER — HYDROXYZINE HYDROCHLORIDE 10 MG/1
10 TABLET, FILM COATED ORAL 3 TIMES DAILY PRN
Status: DISCONTINUED | OUTPATIENT
Start: 2020-04-09 | End: 2020-04-16

## 2020-04-09 RX ORDER — VANCOMYCIN HCL IN 5 % DEXTROSE 1G/250ML
1000 PLASTIC BAG, INJECTION (ML) INTRAVENOUS
Status: DISCONTINUED | OUTPATIENT
Start: 2020-04-09 | End: 2020-04-11

## 2020-04-09 RX ADMIN — INSULIN ASPART 7 UNITS: 100 INJECTION, SOLUTION INTRAVENOUS; SUBCUTANEOUS at 08:04

## 2020-04-09 RX ADMIN — PIPERACILLIN AND TAZOBACTAM 4.5 G: 4; .5 INJECTION, POWDER, LYOPHILIZED, FOR SOLUTION INTRAVENOUS; PARENTERAL at 03:04

## 2020-04-09 RX ADMIN — PIPERACILLIN AND TAZOBACTAM 4.5 G: 4; .5 INJECTION, POWDER, LYOPHILIZED, FOR SOLUTION INTRAVENOUS; PARENTERAL at 06:04

## 2020-04-09 RX ADMIN — Medication 1 TABLET: at 08:04

## 2020-04-09 RX ADMIN — ACETAMINOPHEN 650 MG: 325 TABLET ORAL at 08:04

## 2020-04-09 RX ADMIN — VANCOMYCIN HYDROCHLORIDE 1000 MG: 1 INJECTION, POWDER, LYOPHILIZED, FOR SOLUTION INTRAVENOUS at 08:04

## 2020-04-09 RX ADMIN — INSULIN DETEMIR 20 UNITS: 100 INJECTION, SOLUTION SUBCUTANEOUS at 08:04

## 2020-04-09 RX ADMIN — PIPERACILLIN AND TAZOBACTAM 4.5 G: 4; .5 INJECTION, POWDER, LYOPHILIZED, FOR SOLUTION INTRAVENOUS; PARENTERAL at 09:04

## 2020-04-09 RX ADMIN — ATORVASTATIN CALCIUM 20 MG: 20 TABLET, FILM COATED ORAL at 08:04

## 2020-04-09 RX ADMIN — OXYCODONE HYDROCHLORIDE 10 MG: 5 TABLET ORAL at 08:04

## 2020-04-09 RX ADMIN — INSULIN ASPART 4 UNITS: 100 INJECTION, SOLUTION INTRAVENOUS; SUBCUTANEOUS at 11:04

## 2020-04-09 RX ADMIN — INSULIN ASPART 7 UNITS: 100 INJECTION, SOLUTION INTRAVENOUS; SUBCUTANEOUS at 04:04

## 2020-04-09 RX ADMIN — OXYCODONE HYDROCHLORIDE 10 MG: 5 TABLET ORAL at 06:04

## 2020-04-09 RX ADMIN — INSULIN ASPART 7 UNITS: 100 INJECTION, SOLUTION INTRAVENOUS; SUBCUTANEOUS at 11:04

## 2020-04-09 RX ADMIN — LIDOCAINE 1 PATCH: 50 PATCH CUTANEOUS at 08:04

## 2020-04-09 RX ADMIN — HYDROXYZINE HYDROCHLORIDE 10 MG: 10 TABLET, FILM COATED ORAL at 08:04

## 2020-04-09 RX ADMIN — Medication 6 MG: at 08:04

## 2020-04-09 NOTE — PROGRESS NOTES
"Arrived in pt's room with breakfast tray and extra requested coffee.  Pt verbally refusing vital signs.  Informed pt that vitals are ordered to be obtained every 4 hours.  Pt reporting, "They just did this, goddamnit!".  Per notes, pt has been refusing vitals and refused this AM as well.  Able to convince pt to allow RN to obtain vitals.  PRN and scheduled analgesics administered per request.  Pt continues to be aggressive and use profanities with nursing staff.  Reinforcement of hospital protocol provided.    "

## 2020-04-09 NOTE — PLAN OF CARE
"Problem: Adult Inpatient Plan of Care  Goal: Plan of Care Review  LOC: alert and oriented x 4.   SKIN: The skin is warm, dry.   RESPIRATORY: Respirations are WNL, even and unlabored. Normal effort and rate noted. No accessory muscle use noted. Pt. On room air.  CARDIAC: Patient not on telemetry.  ABDOMEN: Soft and non tender to palpation. No distention noted.   URINARY: continent with urinal at bedside.  EXTREMITIES: Extremities are WNL; general weakness throughout.  Neuro: Pt able to follow commands and is calm and cooperative with care.       POC reviewed with patient. VSS. Patient free of injuries and falls. Patient has pain "all over" 8/10; gave PRN tylenol and oxycodone. Patient receiving vancomycin and zosyn for MRSA pneumonia / parapneumonic empyema. Waiting on results of TB test to rule out patient; airborne precautions maintained. BG at 2200 was 175; gave scheduled levemir and no sliding scale. Patient refusing heparin injection. Patient PIV infiltrated; applied heat and elevated extremity. Anticipated DC pending placement and TB test results. All questions were addressed. WCTM.  "

## 2020-04-09 NOTE — PLAN OF CARE
04/09/20 1033   Discharge Reassessment   Assessment Type Discharge Planning Reassessment   Provided patient/caregiver education on the expected discharge date and the discharge plan Yes   Do you have any problems affording any of your prescribed medications? TBD   Discharge Plan A Long-term acute care facility (LTAC)   Discharge Plan B Home with family   DME Needed Upon Discharge  none   Anticipated Discharge Disposition LTAC     SW/CM attempting to place patient in an outside facility where he can continue to receive his IV antibiotics. Home infusions can't provide care to this patient due to active IVDU.    Julie Haase RN  Case Management [phone]

## 2020-04-09 NOTE — ASSESSMENT & PLAN NOTE
- HgbA1c 11%  - Insulin adjusted to 20U levemir AM, 16U levemir PM, 7U aspart TID   - LDSII  - BG goal 140-180

## 2020-04-09 NOTE — SUBJECTIVE & OBJECTIVE
Interval History: No acute overnight events. Patient continues to express his desire to go home. Per nursing notes, he has been agitated at times.     Review of Systems   Constitutional: Positive for fatigue. Negative for fever.   HENT: Negative for congestion and sore throat.    Eyes: Negative for pain and visual disturbance.   Respiratory: Negative for cough (Denies) and shortness of breath.    Cardiovascular: Negative for chest pain and leg swelling.   Gastrointestinal: Negative for abdominal pain, diarrhea, nausea and vomiting.   Genitourinary: Negative for difficulty urinating and dysuria.   Musculoskeletal: Negative for back pain and neck pain.   Neurological: Negative for syncope and headaches.   Psychiatric/Behavioral: Negative for behavioral problems and confusion.     Objective:     Vital Signs (Most Recent):  Temp: 98.1 °F (36.7 °C) (04/09/20 0835)  Pulse: 91 (04/09/20 0835)  Resp: 18 (04/09/20 0835)  BP: (!) 99/58 (04/09/20 0835)  SpO2: (!) 94 % (04/09/20 0835) Vital Signs (24h Range):  Temp:  [98.1 °F (36.7 °C)-98.9 °F (37.2 °C)] 98.1 °F (36.7 °C)  Pulse:  [85-91] 91  Resp:  [18] 18  SpO2:  [94 %-99 %] 94 %  BP: ()/(57-58) 99/58     Weight: 59.5 kg (131 lb 2.8 oz)  Body mass index is 19.94 kg/m².    Intake/Output Summary (Last 24 hours) at 4/9/2020 1024  Last data filed at 4/9/2020 0600  Gross per 24 hour   Intake 420 ml   Output 500 ml   Net -80 ml      Physical Exam   Constitutional: He is oriented to person, place, and time. He appears well-developed. No distress.   Thin   HENT:   Head: Normocephalic and atraumatic.   Eyes: Pupils are equal, round, and reactive to light. EOM are normal.   Neck: Normal range of motion. No tracheal deviation present. No thyromegaly present.   Cardiovascular: Normal rate and normal heart sounds.   No murmur heard.  Pulmonary/Chest: Effort normal and breath sounds normal. No respiratory distress. He has no rales.   Abdominal: Bowel sounds are normal. He exhibits no  distension.   Musculoskeletal: Normal range of motion. He exhibits no edema.   Neurological: He is alert and oriented to person, place, and time. No cranial nerve deficit.   Skin: He is not diaphoretic.   Nursing note and vitals reviewed.      Significant Labs:   CBC:   Recent Labs   Lab 04/09/20  0349   WBC 7.39   HGB 8.9*   HCT 30.5*   *     CMP:   Recent Labs   Lab 04/09/20  0348   *   K 3.9   CL 98   CO2 27   *   BUN 23*   CREATININE 1.2   CALCIUM 8.7   PROT 8.1   ALBUMIN 2.1*   BILITOT 0.3   ALKPHOS 74   AST 27   ALT 26   ANIONGAP 10   EGFRNONAA >60.0       Significant Imaging: I have reviewed all pertinent imaging results/findings within the past 24 hours.

## 2020-04-09 NOTE — PROGRESS NOTES
Pharmacokinetic Assessment Follow Up: IV Vancomycin    Vancomycin serum concentration assessment(s):    The trough level was drawn correctly and can be used to guide therapy at this time. The measurement is above the desired definitive target range of 15 to 20 mcg/mL.    Vancomycin Regimen Plan:    Discontinue the scheduled vancomycin regimen and re-dose when the random level is less than 20 mcg/mL, next level to be drawn at 0900 on 4/09.    Drug levels (last 3 results):  Recent Labs   Lab Result Units 04/08/20  1940   Vancomycin-Trough ug/mL 22.0       Pharmacy will continue to follow and monitor vancomycin.    Please contact pharmacy at extension [phone] for questions regarding this assessment.    Thank you for the consult,   Cullen Metz       Patient brief summary:  Cali Mabry is a 54 y.o. male initiated on antimicrobial therapy with IV Vancomycin for treatment of pneumonia    The patient's current regimen is suspended until vancomycin level returns to therapeutic range    Drug Allergies:   Review of patient's allergies indicates:  No Known Allergies    Actual Body Weight:   59.5kg     Renal Function:   Estimated Creatinine Clearance: 59.9 mL/min (based on SCr of 1.2 mg/dL).,     Dialysis Method (if applicable):  N/A    CBC (last 72 hours):  Recent Labs   Lab Result Units 04/07/20  0628   WBC K/uL 8.36   Hemoglobin g/dL 8.4*   Hematocrit % 27.9*   Platelets K/uL 645*   Gran% % 63.8   Lymph% % 20.8   Mono% % 10.9   Eosinophil% % 1.2   Basophil% % 0.4   Differential Method  Automated       Metabolic Panel (last 72 hours):  Recent Labs   Lab Result Units 04/06/20  1827 04/07/20  0628   Sodium mmol/L 131* 131*   Potassium mmol/L 5.0 4.1   Chloride mmol/L 92* 93*   CO2 mmol/L 29 28   Glucose mg/dL 540* 301*   BUN, Bld mg/dL 21* 17   Creatinine mg/dL 1.4 1.2   Albumin g/dL  --  1.9*   Total Bilirubin mg/dL  --  0.3   Alkaline Phosphatase U/L  --  77   AST U/L  --  26   ALT U/L  --  20   Magnesium mg/dL  --   2.1   Phosphorus mg/dL  --  3.6       Vancomycin Administrations:  vancomycin given in the last 96 hours                   vancomycin 750 mg in dextrose 5 % 250 mL IVPB (ready to mix system) (mg) 750 mg New Bag 04/08/20 2026     750 mg New Bag  0830     750 mg New Bag 04/07/20 2021     750 mg New Bag 04/06/20 2007     750 mg New Bag  1001     750 mg New Bag 04/05/20 2037     750 mg New Bag  0839                Microbiologic Results:  Microbiology Results (last 7 days)     Procedure Component Value Units Date/Time    Blood culture [[id number]] Collected:  04/01/20 1328    Order Status:  Completed Specimen:  Blood Updated:  04/05/20 1554     Blood Culture, Routine No growth after 4 days    Narrative:       Aerobic and anaerobic from site 2    Blood culture [[id number]] Collected:  04/01/20 1328    Order Status:  Completed Specimen:  Blood Updated:  04/05/20 1553     Blood Culture, Routine No growth after 4 days    Narrative:       Aerobic and anaerobic from site 1    Blood culture [[id number]] Collected:  03/29/20 1535    Order Status:  Completed Specimen:  Blood Updated:  04/02/20 1812     Blood Culture, Routine No Growth after 4 days.     Narrative:       Collection has been rescheduled by LW1 at 03/29/2020 12:34 Reason:   Patient Refused wants to be cleaned first before labs are drawn tech   will come back in 30 mins spoke with nurse Sharonda  Collection has been rescheduled by LW1 at 03/29/2020 13:34 Reason:   Unable to collect pt wants tech to come back when he's done eating  Collection has been rescheduled by LW1 at 03/29/2020 12:34 Reason:   Patient Refused wants to be cleaned first before labs are drawn tech   will come back in 30 mins spoke with nurse Sharonda  Collection has been rescheduled by LW1 at 03/29/2020 13:34 Reason:   Unable to collect pt wants tech to come back when he's done eating    Blood culture [[id number]] Collected:  03/29/20 1535    Order Status:  Completed Specimen:  Blood Updated:   04/02/20 1812     Blood Culture, Routine No Growth after 4 days.     Narrative:       Collection has been rescheduled by LW1 at 03/29/2020 12:34 Reason:   Patient Refused wants to be cleaned first before labs are drawn tech   will come back in 30 mins spoke with nurse Sharonda  Collection has been rescheduled by LW1 at 03/29/2020 13:34 Reason:   Unable to collect pt wants tech to come back when he's done eating  Collection has been rescheduled by LW1 at 03/29/2020 12:34 Reason:   Patient Refused wants to be cleaned first before labs are drawn tech   will come back in 30 mins spoke with nurse Sharonda  Collection has been rescheduled by LW1 at 03/29/2020 13:34 Reason:   Unable to collect pt wants tech to come back when he's done eating    Fungus culture [[id number]] Collected:  03/29/20 2137    Order Status:  Completed Specimen:  Body Fluid from Pleural Fluid Updated:  04/02/20 0937     Fungus (Mycology) Culture Culture in progress

## 2020-04-09 NOTE — PROGRESS NOTES
Pharmacokinetic Assessment Follow Up: IV Vancomycin    Vancomycin serum concentration assessment/plan:  · Random level =21.6; goal 15-20, septic emboli  · Change Vancomycin to 1000 mg IV q24h  · Next trough to be drawn before the 3rd dose on 4/11@1930    Drug levels (last 3 results):  Recent Labs   Lab Result Units 04/08/20  1940 04/09/20  0804   Vancomycin, Random ug/mL  --  21.6   Vancomycin-Trough ug/mL 22.0  --      Pharmacy will continue to follow and monitor vancomycin.    Please contact pharmacy at extension [phone] for questions regarding this assessment.    Thank you for the consult,   Evie Durham     Patient brief summary:  Cali Mabry is a 54 y.o. male initiated on antimicrobial therapy with IV Vancomycin for treatment of septic ebmolism     The patient's current regimen is 750 mg IV q12h    Drug Allergies:   Review of patient's allergies indicates:  No Known Allergies    Actual Body Weight:   59.5 kg     Renal Function:   Estimated Creatinine Clearance: 59.9 mL/min (based on SCr of 1.2 mg/dL).,     CBC (last 72 hours):  Recent Labs   Lab Result Units 04/07/20 0628 04/09/20  0349   WBC K/uL 8.36 7.39   Hemoglobin g/dL 8.4* 8.9*   Hematocrit % 27.9* 30.5*   Platelets K/uL 645* 568*   Gran% % 63.8 53.1   Lymph% % 20.8 32.6   Mono% % 10.9 11.1   Eosinophil% % 1.2 1.1   Basophil% % 0.4 0.5   Differential Method  Automated Automated       Metabolic Panel (last 72 hours):  Recent Labs   Lab Result Units 04/06/20  1827 04/07/20 0628 04/09/20  0348   Sodium mmol/L 131* 131* 135*   Potassium mmol/L 5.0 4.1 3.9   Chloride mmol/L 92* 93* 98   CO2 mmol/L 29 28 27   Glucose mg/dL 540* 301* 185*   BUN, Bld mg/dL 21* 17 23*   Creatinine mg/dL 1.4 1.2 1.2   Albumin g/dL  --  1.9* 2.1*   Total Bilirubin mg/dL  --  0.3 0.3   Alkaline Phosphatase U/L  --  77 74   AST U/L  --  26 27   ALT U/L  --  20 26   Magnesium mg/dL  --  2.1 2.2   Phosphorus mg/dL  --  3.6 2.8       Vancomycin Administrations:  vancomycin  given in the last 96 hours                   vancomycin 750 mg in dextrose 5 % 250 mL IVPB (ready to mix system) (mg) 750 mg New Bag 04/08/20 2026     750 mg New Bag  0830     750 mg New Bag 04/07/20 2021     750 mg New Bag 04/06/20 2007     750 mg New Bag  1001     750 mg New Bag 04/05/20 2037     750 mg New Bag  0839                Microbiologic Results:  Microbiology Results (last 7 days)     Procedure Component Value Units Date/Time    Blood culture [[id number]] Collected:  04/01/20 1328    Order Status:  Completed Specimen:  Blood Updated:  04/05/20 1554     Blood Culture, Routine No growth after 4 days    Narrative:       Aerobic and anaerobic from site 2    Blood culture [[id number]] Collected:  04/01/20 1328    Order Status:  Completed Specimen:  Blood Updated:  04/05/20 1553     Blood Culture, Routine No growth after 4 days    Narrative:       Aerobic and anaerobic from site 1    Blood culture [[id number]] Collected:  03/29/20 1535    Order Status:  Completed Specimen:  Blood Updated:  04/02/20 1812     Blood Culture, Routine No Growth after 4 days.     Narrative:       Collection has been rescheduled by LW1 at 03/29/2020 12:34 Reason:   Patient Refused wants to be cleaned first before labs are drawn tech   will come back in 30 mins spoke with nurse Sharonda  Collection has been rescheduled by LW1 at 03/29/2020 13:34 Reason:   Unable to collect pt wants tech to come back when he's done eating  Collection has been rescheduled by LW1 at 03/29/2020 12:34 Reason:   Patient Refused wants to be cleaned first before labs are drawn tech   will come back in 30 mins spoke with nurse Sharonda  Collection has been rescheduled by LW1 at 03/29/2020 13:34 Reason:   Unable to collect pt wants tech to come back when he's done eating    Blood culture [[id number]] Collected:  03/29/20 1535    Order Status:  Completed Specimen:  Blood Updated:  04/02/20 1812     Blood Culture, Routine No Growth after 4 days.     Narrative:        Collection has been rescheduled by LW1 at 03/29/2020 12:34 Reason:   Patient Refused wants to be cleaned first before labs are drawn tech   will come back in 30 mins spoke with nurse Sharonda  Collection has been rescheduled by LW1 at 03/29/2020 13:34 Reason:   Unable to collect pt wants tech to come back when he's done eating  Collection has been rescheduled by LW1 at 03/29/2020 12:34 Reason:   Patient Refused wants to be cleaned first before labs are drawn tech   will come back in 30 mins spoke with nurse Sharonda  Collection has been rescheduled by LW1 at 03/29/2020 13:34 Reason:   Unable to collect pt wants tech to come back when he's done eating    Fungus culture [[id number]] Collected:  03/29/20 2137    Order Status:  Completed Specimen:  Body Fluid from Pleural Fluid Updated:  04/02/20 0937     Fungus (Mycology) Culture Culture in progress

## 2020-04-09 NOTE — PROGRESS NOTES
"[facility]  Adult Nutrition  Progress Note    SUMMARY       Recommendations  1. Continue diabetic diet + Boost Glucose Control TID as tolerated.   2. Please obtain updated weight.   3. RD to monitor.    Goals: Pt meets 75% of EEN and EPN by RD follow up  Nutrition Goal Status: progressing towards goal  Communication of RD Recs: other (comment)    Reason for Assessment    Reason For Assessment: RD follow-up  Diagnosis: other (see comments)(Acute Respiratory failure/ Empyema)  Relevant Medical History: Dm, HLD, IVDU, Hepatitis, Drug user  Interdisciplinary Rounds: did not attend  General Information Comments: RD working remotely. Unable to reach pt on multiple attempts. Per RN, with improved intake today 100% of meals, also drinking Boost. Variable intake earlier this week 25-50% per chart, decreased from 75% per last RD note but appears to be improving again. No updated wt since 3/31. Wt stable 150# PTA with wt loss since admission, potentially fluid related as pt is -2.5L, will monitor. NFPE to be completed at a later date 2/2 remote access & COVID-19 precautions.   Nutrition Discharge Planning: Adequate PO intake on diabetic diet    Nutrition Risk Screen    Nutrition Risk Screen: no indicators present    Nutrition/Diet History    Spiritual, Cultural Beliefs, Taoist Practices, Values that Affect Care: no    Anthropometrics    Temp: 98.6 °F (37 °C)  Height Method: Stated  Height: 5' 8" (172.7 cm)  Height (inches): 68 in  Weight Method: Bed Scale  Weight: 59.5 kg (131 lb 2.8 oz)  Weight (lb): 131.18 lb  Ideal Body Weight (IBW), Male: 154 lb  % Ideal Body Weight, Male (lb): 87.01 %  BMI (Calculated): 19.9  BMI Grade: 18.5-24.9 - normal    Lab/Procedures/Meds    Pertinent Labs Reviewed: reviewed  Pertinent Labs Comments: Na 135, Cr 23, Glu 185, Alb 2.1  Pertinent Medications Reviewed: reviewed  Pertinent Medications Comments: statin, folic acid, insulin, ondansetron, " senna-docusate    Estimated/Assessed Needs    Weight Used For Calorie Calculations: 59.5 kg (131 lb 2.8 oz)  Energy Calorie Requirements (kcal): 1838(1.3 activity factor)  Energy Need Method: [place]  Protein Requirements: 60-72 g/day(1-1.2 g/kg)  Weight Used For Protein Calculations: 59.5 kg (131 lb 2.8 oz)     Estimated Fluid Requirement Method: RDA Method  RDA Method (mL): 1838  CHO Requirement: 270    Nutrition Prescription Ordered    Current Diet Order: Diabetic  Nutrition Order Comments: 2000kcal  Oral Nutrition Supplement: Boost Glucose Control TID    Evaluation of Received Nutrient/Fluid Intake    I/O: -2.5L since admit  Comments: LBM 4/6  Tolerance: tolerating  % Intake of Estimated Energy Needs: 75 - 100 %  % Meal Intake: 75 - 100 %    Nutrition Risk    Level of Risk/Frequency of Follow-up: low(1xweek)     Assessment and Plan    Nutrition Problem  Inadequate energy intake    Related to (etiology):   Decreased appetite    Signs and Symptoms (as evidenced by):   Pt with decreased intake 25-50% of meals earlier this week 4/6-4/8    Interventions (treatment strategy):  Collaboration with other providers  Commercial beverage - Boost Glucose Control TID    Nutrition Diagnosis Status:   New, improving    Monitor and Evaluation    Food and Nutrient Intake: energy intake  Food and Nutrient Adminstration: diet order  Anthropometric Measurements: weight, weight change, body mass index  Biochemical Data, Medical Tests and Procedures: electrolyte and renal panel, gastrointestinal profile, glucose/endocrine profile, inflammatory profile, lipid profile  Nutrition-Focused Physical Findings: overall appearance     Malnutrition Assessment  Due to recent hospital wide restrictions to limit the transfer of COVID-19, we are not performing any physical exams at this time. All S/S will be observational; NFPE to be performed at a future date.    Nutrition Follow-Up    RD Follow-up?: Yes

## 2020-04-09 NOTE — PROGRESS NOTES
"[facility] Hospital Medicine  Progress Note    Patient Name: Cali Mabry  MRN: [id number]  Patient Class: IP- Inpatient   Admission Date: 3/26/2020  Length of Stay: 13 days  Attending Physician: Michoacano Holbrook MD  Primary Care Provider: Primary Doctor [facility] Medicine Team: [facility] HOSP MED 1 Ayanna Edward MD    Subjective:     Principal Problem:Acute respiratory failure with hypoxia    HPI:  Cali Mabry is a 54 y.o. with DM II, hepatitis C, HTN, polysubstance abuse/IVDU, pancreatitis who [place] [facility] [place] a 2 day history of chest pain and shortness of breath.  Chest pain is centralized and just described as "painful." Pain aggravated with movement and deep breathing. He reports an intermittent cough that is non-productive. He denies sick contacts, fever, chills, sore throat. Upon questioning, patient stated "I have the virus so just treat me for it." Educated patient that testing is still in process. Inquired about IV drug use however he was unable to recall when he last used. Patient swatted examiner's hand after chest palpation, yelling "that hurts!" Patient became aggravated, reported his pain was now worse and requested pain medication. Per nurse, patient given PRN dose an hour prior. Patient encouraged to continue discussing his plan of care however stated, "I don't want to chat."   ED: Afebrile with leukocytosis. CTA chest from 3/26 with dense consolidative changes within the right lower lobe with internal cavitation containing air and fluid; Occlusion is seen of the right lower lobe bronchi which may be secondary to mucoid impaction or aspirated material; Additional multifocal opacities and cavitations.  Vancomycin and Zosyn administered. On 2L NC. VSS. Admitted to hospital medicine for further evaluation and COVID-19 rule out.        Overview/Hospital Course:  53 yo male with IVDU with Hep C, hx of MRSA (V sens), Pancreatitis, DM2, who presents " as a transfer from [facility] for SOB and CP, CT + multifocal apical cavitations and opacities, assoc. w/ air +fluid, suspected due to MRSA pneumonia c/b parapneumonic empyema and possible septic embolization. covid -, s/d 3/28. Chest tube placed 3/30 qhs on TPA x 6 doses (2/6 doses on 3/30 and 3/29). CTS no intervention for now. TTE negative. Chest tube was pulled. ID's final recommendations included: plans todischarge with IV vanc + cefpedoxime or augmentin for 4-6 weeks total (end date 05/09/2020). Repeat imaging in 4 weeks and may require doxycyline following re imaging if worsening. If patient leaves AMA, can offer linezolid, but that would be suboptimal treatment.     Throughout hospital stay, patient's insulin was titrated to maintain -180.    Interval History: No acute overnight events. Patient continues to express his desire to go home. Per nursing notes, he has been agitated at times.     Review of Systems   Constitutional: Positive for fatigue. Negative for fever.   HENT: Negative for congestion and sore throat.    Eyes: Negative for pain and visual disturbance.   Respiratory: Negative for cough (Denies) and shortness of breath.    Cardiovascular: Negative for chest pain and leg swelling.   Gastrointestinal: Negative for abdominal pain, diarrhea, nausea and vomiting.   Genitourinary: Negative for difficulty urinating and dysuria.   Musculoskeletal: Negative for back pain and neck pain.   Neurological: Negative for syncope and headaches.   Psychiatric/Behavioral: Negative for behavioral problems and confusion.     Objective:     Vital Signs (Most Recent):  Temp: 98.1 °F (36.7 °C) (04/09/20 0835)  Pulse: 91 (04/09/20 0835)  Resp: 18 (04/09/20 0835)  BP: (!) 99/58 (04/09/20 0835)  SpO2: (!) 94 % (04/09/20 0835) Vital Signs (24h Range):  Temp:  [98.1 °F (36.7 °C)-98.9 °F (37.2 °C)] 98.1 °F (36.7 °C)  Pulse:  [85-91] 91  Resp:  [18] 18  SpO2:  [94 %-99 %] 94 %  BP: ()/(57-58) 99/58     Weight: 59.5  kg (131 lb 2.8 oz)  Body mass index is 19.94 kg/m².    Intake/Output Summary (Last 24 hours) at 4/9/2020 1024  Last data filed at 4/9/2020 0600  Gross per 24 hour   Intake 420 ml   Output 500 ml   Net -80 ml      Physical Exam   Constitutional: He is oriented to person, place, and time. He appears well-developed. No distress.   Thin   HENT:   Head: Normocephalic and atraumatic.   Eyes: Pupils are equal, round, and reactive to light. EOM are normal.   Neck: Normal range of motion. No tracheal deviation present. No thyromegaly present.   Cardiovascular: Normal rate and normal heart sounds.   No murmur heard.  Pulmonary/Chest: Effort normal and breath sounds normal. No respiratory distress. He has no rales.   Abdominal: Bowel sounds are normal. He exhibits no distension.   Musculoskeletal: Normal range of motion. He exhibits no edema.   Neurological: He is alert and oriented to person, place, and time. No cranial nerve deficit.   Skin: He is not diaphoretic.   Nursing note and vitals reviewed.      Significant Labs:   CBC:   Recent Labs   Lab 04/09/20  0349   WBC 7.39   HGB 8.9*   HCT 30.5*   *     CMP:   Recent Labs   Lab 04/09/20  0348   *   K 3.9   CL 98   CO2 27   *   BUN 23*   CREATININE 1.2   CALCIUM 8.7   PROT 8.1   ALBUMIN 2.1*   BILITOT 0.3   ALKPHOS 74   AST 27   ALT 26   ANIONGAP 10   EGFRNONAA >60.0       Significant Imaging: I have reviewed all pertinent imaging results/findings within the past 24 hours.      Assessment/Plan:      * Acute respiratory failure with hypoxia  54M with IVDU with Hep C, hx of MRSA (V sens), Pancreatitis, DM2, who presents as a transfer from [facility] for multifocal apical cavitations and opacities, assoc. w/ air +fluid, suspected due to MRSA pneumonia c/b parapneumonic empyema and possible septic embolization. covid -, s/d 3/28   Remains stable on vanc and zosyn, cultures remain clear. Disposition- unable to place to  due to hx of IVDU. Patient does not  desire outside facility transfer.     RESOLVED as patient is stable on RA.    Plan:  - Recent Blood cultures remain negative, WBC count now coming down.    - Continue TB rule out, pending AFB samples. No acid fast bacilli on gram stain.   - Follow up with ID: continue broad spectrum therapy, on vanc and zosyn. STOP ceftriaxone on 4/3, per ID, lactobacillus in pleural fluid can be resistant, also with E Coli/Klebsiella.  -plans to discharge with IV vanc + cefpedoxime or augmentin for 4-6 weeks total (end date 05/09/2020). Repeat imaging in 4 weeks and may require doxycyline following re imaging if worsening.   - If patient leaves AMA, can offer linezolid, but would be suboptimal treatment.   - CTS consulted, appreciate assistance: unlikely to perform intervention as he has had good output and decrease on O2 requirements. TTE negative.  - TPA per pulmonology, received 5/5 doses with good output in chest tube (placed 03/30) and improvement in respiratory status. CT pulled.    Reactive thrombocytosis  Likely 2/2 infection  Or possible bleeding, please see anemia   Trend CBC    Iron deficiency anemia        Parapneumonic effusion  See above       Septic embolism  TTE negative  Neuro checks q4h.       Hyponatremia  Improved with blood sugar control and fluid hydration. Likely related to hyperglycemia as corrected Na within normal.   Component of decreased effective art volume    Continue to trend     Opioid abuse        Tobacco abuse        Chronic hepatitis C        Anemia of chronic disease        Intravenous drug abuse    -counseled on cessation      Hyperlipidemia  - continue statin       Normocytic normochromic anemia  Hb 6.5 on 4/4/2020 s/p transfusion x 1 prbc  Stable with appropriate response Hb.   No active bleeding  Type/cross  Iron studies consistent with Iron deficiency and ACD        Essential hypertension  Stable, not on meds       Type 2 diabetes mellitus, uncontrolled  - HgbA1c 11%  - Insulin adjusted to  20U levemir AM, 16U levemir PM, 7U aspart TID   - LDSII  - BG goal 140-180    Opioid dependence, continuous          Patient seen, and case discussed with staff, Dr. Holbrook. Attending attestation to follow.    VTE Risk Mitigation (From admission, onward)         Ordered     enoxaparin injection 40 mg  Daily      04/04/20 0800     IP VTE HIGH RISK PATIENT  Once      03/27/20 1511                  Ayanna Edward MD  Department of Hospital Medicine   [facility]

## 2020-04-10 LAB
POCT GLUCOSE: 212 MG/DL (ref 70–110)
POCT GLUCOSE: 269 MG/DL (ref 70–110)
POCT GLUCOSE: 321 MG/DL (ref 70–110)
POCT GLUCOSE: 321 MG/DL (ref 70–110)

## 2020-04-10 PROCEDURE — 20600001 HC STEP DOWN PRIVATE ROOM

## 2020-04-10 PROCEDURE — 99233 SBSQ HOSP IP/OBS HIGH 50: CPT | Mod: GC,,,

## 2020-04-10 PROCEDURE — 63600175 PHARM REV CODE 636 W HCPCS

## 2020-04-10 PROCEDURE — 25000003 PHARM REV CODE 250: Performed by: STUDENT IN AN ORGANIZED HEALTH CARE EDUCATION/TRAINING PROGRAM

## 2020-04-10 PROCEDURE — 25000003 PHARM REV CODE 250: Performed by: INTERNAL MEDICINE

## 2020-04-10 PROCEDURE — 99233 PR SUBSEQUENT HOSPITAL CARE,LEVL III: ICD-10-PCS | Mod: GC,,,

## 2020-04-10 PROCEDURE — 63600175 PHARM REV CODE 636 W HCPCS: Performed by: STUDENT IN AN ORGANIZED HEALTH CARE EDUCATION/TRAINING PROGRAM

## 2020-04-10 PROCEDURE — 25000003 PHARM REV CODE 250: Performed by: PHYSICIAN ASSISTANT

## 2020-04-10 RX ADMIN — PIPERACILLIN AND TAZOBACTAM 4.5 G: 4; .5 INJECTION, POWDER, LYOPHILIZED, FOR SOLUTION INTRAVENOUS; PARENTERAL at 12:04

## 2020-04-10 RX ADMIN — INSULIN ASPART 1 UNITS: 100 INJECTION, SOLUTION INTRAVENOUS; SUBCUTANEOUS at 08:04

## 2020-04-10 RX ADMIN — HYDROXYZINE HYDROCHLORIDE 10 MG: 10 TABLET, FILM COATED ORAL at 08:04

## 2020-04-10 RX ADMIN — VANCOMYCIN HYDROCHLORIDE 1000 MG: 1 INJECTION, POWDER, LYOPHILIZED, FOR SOLUTION INTRAVENOUS at 08:04

## 2020-04-10 RX ADMIN — OXYCODONE HYDROCHLORIDE 10 MG: 5 TABLET ORAL at 09:04

## 2020-04-10 RX ADMIN — INSULIN ASPART 7 UNITS: 100 INJECTION, SOLUTION INTRAVENOUS; SUBCUTANEOUS at 04:04

## 2020-04-10 RX ADMIN — Medication 1 TABLET: at 08:04

## 2020-04-10 RX ADMIN — ATORVASTATIN CALCIUM 20 MG: 20 TABLET, FILM COATED ORAL at 08:04

## 2020-04-10 RX ADMIN — LIDOCAINE 1 PATCH: 50 PATCH CUTANEOUS at 08:04

## 2020-04-10 RX ADMIN — INSULIN ASPART 4 UNITS: 100 INJECTION, SOLUTION INTRAVENOUS; SUBCUTANEOUS at 11:04

## 2020-04-10 RX ADMIN — ACETAMINOPHEN 650 MG: 325 TABLET ORAL at 05:04

## 2020-04-10 RX ADMIN — PIPERACILLIN AND TAZOBACTAM 4.5 G: 4; .5 INJECTION, POWDER, LYOPHILIZED, FOR SOLUTION INTRAVENOUS; PARENTERAL at 05:04

## 2020-04-10 RX ADMIN — HYDROXYZINE HYDROCHLORIDE 10 MG: 10 TABLET, FILM COATED ORAL at 09:04

## 2020-04-10 RX ADMIN — INSULIN ASPART 7 UNITS: 100 INJECTION, SOLUTION INTRAVENOUS; SUBCUTANEOUS at 07:04

## 2020-04-10 RX ADMIN — PIPERACILLIN AND TAZOBACTAM 4.5 G: 4; .5 INJECTION, POWDER, LYOPHILIZED, FOR SOLUTION INTRAVENOUS; PARENTERAL at 09:04

## 2020-04-10 RX ADMIN — INSULIN ASPART 3 UNITS: 100 INJECTION, SOLUTION INTRAVENOUS; SUBCUTANEOUS at 05:04

## 2020-04-10 RX ADMIN — INSULIN ASPART 7 UNITS: 100 INJECTION, SOLUTION INTRAVENOUS; SUBCUTANEOUS at 11:04

## 2020-04-10 RX ADMIN — INSULIN DETEMIR 20 UNITS: 100 INJECTION, SOLUTION SUBCUTANEOUS at 08:04

## 2020-04-10 RX ADMIN — HYDROXYZINE HYDROCHLORIDE 10 MG: 10 TABLET, FILM COATED ORAL at 03:04

## 2020-04-10 RX ADMIN — OXYCODONE HYDROCHLORIDE 10 MG: 5 TABLET ORAL at 08:04

## 2020-04-10 NOTE — SUBJECTIVE & OBJECTIVE
Interval History: No acute overnight events. Patient reports taking hydroxyzine at night so he is not sure if helped with anxiety but would like to try it this morning.     Review of Systems   Constitutional: Positive for fatigue. Negative for fever.   HENT: Negative for congestion and sore throat.    Eyes: Negative for pain and visual disturbance.   Respiratory: Negative for cough and shortness of breath.    Cardiovascular: Negative for chest pain and leg swelling.   Gastrointestinal: Negative for abdominal pain, diarrhea, nausea and vomiting.   Genitourinary: Negative for difficulty urinating and dysuria.   Musculoskeletal: Negative for back pain and neck pain.   Neurological: Negative for syncope and headaches.   Psychiatric/Behavioral: Negative for behavioral problems and confusion.     Objective:     Vital Signs (Most Recent):  Temp: 97.7 °F (36.5 °C) (04/10/20 0854)  Pulse: 86 (04/10/20 0854)  Resp: 18 (04/10/20 0854)  BP: (!) 104/55 (04/10/20 0854)  SpO2: 97 % (04/10/20 0854) Vital Signs (24h Range):  Temp:  [97.7 °F (36.5 °C)-98.6 °F (37 °C)] 97.7 °F (36.5 °C)  Pulse:  [80-86] 86  Resp:  [18] 18  SpO2:  [93 %-97 %] 97 %  BP: (104-119)/(55-67) 104/55     Weight: 59.5 kg (131 lb 2.8 oz)  Body mass index is 19.94 kg/m².    Intake/Output Summary (Last 24 hours) at 4/10/2020 1048  Last data filed at 4/10/2020 0000  Gross per 24 hour   Intake 361 ml   Output 1950 ml   Net -1589 ml      Physical Exam   Constitutional: He is oriented to person, place, and time. He appears well-developed. No distress.   Thin   HENT:   Head: Normocephalic and atraumatic.   Eyes: Pupils are equal, round, and reactive to light. EOM are normal.   Neck: Normal range of motion. No tracheal deviation present. No thyromegaly present.   Cardiovascular: Normal rate and normal heart sounds.   No murmur heard.  Pulmonary/Chest: Effort normal and breath sounds normal. No respiratory distress. He has no rales.   Abdominal: Bowel sounds are normal.  He exhibits no distension.   Musculoskeletal: Normal range of motion. He exhibits no edema.   Neurological: He is alert and oriented to person, place, and time. No cranial nerve deficit.   Skin: He is not diaphoretic.   Nursing note and vitals reviewed.      Significant Labs:   CBC:   Recent Labs   Lab 04/09/20  0349   WBC 7.39   HGB 8.9*   HCT 30.5*   *     CMP:   Recent Labs   Lab 04/09/20  0348   *   K 3.9   CL 98   CO2 27   *   BUN 23*   CREATININE 1.2   CALCIUM 8.7   PROT 8.1   ALBUMIN 2.1*   BILITOT 0.3   ALKPHOS 74   AST 27   ALT 26   ANIONGAP 10   EGFRNONAA >60.0       Significant Imaging: I have reviewed all pertinent imaging results/findings within the past 24 hours.

## 2020-04-10 NOTE — PLAN OF CARE
"Problem: Adult Inpatient Plan of Care  Goal: Plan of Care Review  LOC: alert and oriented x 4.   SKIN: The skin is warm, dry.   RESPIRATORY: Respirations are WNL, even and unlabored. Normal effort and rate noted. No accessory muscle use noted. Pt. On room air.  CARDIAC: Patient not on telemetry.  ABDOMEN: Soft and non tender to palpation. No distention noted.   URINARY: continent with urinal at bedside.  EXTREMITIES: Extremities are WNL; general weakness throughout.  Neuro: Pt able to follow commands and is frustrated with care.     POC reviewed with patient. VSS. Patient free of injuries and falls. Patient has pain "all over" 6-8/10; gave PRN tylenol and hydroxyzine for stated anxiety. Patient receiving vancomycin and zosyn for MRSA pneumonia / parapneumonic empyema. Waiting on results of TB test to rule out patient; airborne precautions maintained. BG at 2200 was 220; gave scheduled levemir and 1 unit sliding scale. Patient refusing heparin injection. Anticipated DC pending placement for IV ABx course and TB test results. All questions were addressed. WCTM.  "

## 2020-04-10 NOTE — PLAN OF CARE
A A O x 4. Free of falls, traumas and injuries. Skin intact. Denies shortness of breath, chest pain, nausea, vomiting. ABX per MAR. BG monitored ACHS. Airborne precautions maintained. Plan of care reviewed with patient. Vital signs stable. Pain monitored. Will continue to monitor.

## 2020-04-11 LAB
ALBUMIN SERPL BCP-MCNC: 2.2 G/DL (ref 3.5–5.2)
ALP SERPL-CCNC: 71 U/L (ref 55–135)
ALT SERPL W/O P-5'-P-CCNC: 34 U/L (ref 10–44)
ANION GAP SERPL CALC-SCNC: 9 MMOL/L (ref 8–16)
AST SERPL-CCNC: 36 U/L (ref 10–40)
BASOPHILS # BLD AUTO: 0.04 K/UL (ref 0–0.2)
BASOPHILS NFR BLD: 0.6 % (ref 0–1.9)
BILIRUB SERPL-MCNC: 0.3 MG/DL (ref 0.1–1)
BUN SERPL-MCNC: 21 MG/DL (ref 6–20)
CALCIUM SERPL-MCNC: 8.3 MG/DL (ref 8.7–10.5)
CHLORIDE SERPL-SCNC: 99 MMOL/L (ref 95–110)
CO2 SERPL-SCNC: 26 MMOL/L (ref 23–29)
CREAT SERPL-MCNC: 1.3 MG/DL (ref 0.5–1.4)
DIFFERENTIAL METHOD: ABNORMAL
EOSINOPHIL # BLD AUTO: 0.1 K/UL (ref 0–0.5)
EOSINOPHIL NFR BLD: 0.9 % (ref 0–8)
ERYTHROCYTE [DISTWIDTH] IN BLOOD BY AUTOMATED COUNT: 17.3 % (ref 11.5–14.5)
EST. GFR  (AFRICAN AMERICAN): >60 ML/MIN/1.73 M^2
EST. GFR  (NON AFRICAN AMERICAN): >60 ML/MIN/1.73 M^2
GLUCOSE SERPL-MCNC: 318 MG/DL (ref 70–110)
HCT VFR BLD AUTO: 31.3 % (ref 40–54)
HGB BLD-MCNC: 8.7 G/DL (ref 14–18)
IMM GRANULOCYTES # BLD AUTO: 0.08 K/UL (ref 0–0.04)
IMM GRANULOCYTES NFR BLD AUTO: 1.2 % (ref 0–0.5)
LYMPHOCYTES # BLD AUTO: 2.4 K/UL (ref 1–4.8)
LYMPHOCYTES NFR BLD: 35.5 % (ref 18–48)
MAGNESIUM SERPL-MCNC: 2.2 MG/DL (ref 1.6–2.6)
MCH RBC QN AUTO: 25 PG (ref 27–31)
MCHC RBC AUTO-ENTMCNC: 27.8 G/DL (ref 32–36)
MCV RBC AUTO: 90 FL (ref 82–98)
MONOCYTES # BLD AUTO: 0.7 K/UL (ref 0.3–1)
MONOCYTES NFR BLD: 10.2 % (ref 4–15)
NEUTROPHILS # BLD AUTO: 3.5 K/UL (ref 1.8–7.7)
NEUTROPHILS NFR BLD: 51.6 % (ref 38–73)
NRBC BLD-RTO: 0 /100 WBC
PHOSPHATE SERPL-MCNC: 2.9 MG/DL (ref 2.7–4.5)
PLATELET # BLD AUTO: 479 K/UL (ref 150–350)
PMV BLD AUTO: 9.1 FL (ref 9.2–12.9)
POCT GLUCOSE: 133 MG/DL (ref 70–110)
POCT GLUCOSE: 335 MG/DL (ref 70–110)
POCT GLUCOSE: 363 MG/DL (ref 70–110)
POCT GLUCOSE: 417 MG/DL (ref 70–110)
POTASSIUM SERPL-SCNC: 4.6 MMOL/L (ref 3.5–5.1)
PROT SERPL-MCNC: 7.7 G/DL (ref 6–8.4)
RBC # BLD AUTO: 3.48 M/UL (ref 4.6–6.2)
SODIUM SERPL-SCNC: 134 MMOL/L (ref 136–145)
VANCOMYCIN TROUGH SERPL-MCNC: 10.9 UG/ML (ref 10–22)
WBC # BLD AUTO: 6.68 K/UL (ref 3.9–12.7)

## 2020-04-11 PROCEDURE — 25000003 PHARM REV CODE 250: Performed by: STUDENT IN AN ORGANIZED HEALTH CARE EDUCATION/TRAINING PROGRAM

## 2020-04-11 PROCEDURE — 25000003 PHARM REV CODE 250: Performed by: INTERNAL MEDICINE

## 2020-04-11 PROCEDURE — 83735 ASSAY OF MAGNESIUM: CPT

## 2020-04-11 PROCEDURE — 25000003 PHARM REV CODE 250: Performed by: PHYSICIAN ASSISTANT

## 2020-04-11 PROCEDURE — 85025 COMPLETE CBC W/AUTO DIFF WBC: CPT

## 2020-04-11 PROCEDURE — 99233 PR SUBSEQUENT HOSPITAL CARE,LEVL III: ICD-10-PCS | Mod: GC,,,

## 2020-04-11 PROCEDURE — 80202 ASSAY OF VANCOMYCIN: CPT

## 2020-04-11 PROCEDURE — 36415 COLL VENOUS BLD VENIPUNCTURE: CPT

## 2020-04-11 PROCEDURE — 63600175 PHARM REV CODE 636 W HCPCS

## 2020-04-11 PROCEDURE — 20600001 HC STEP DOWN PRIVATE ROOM

## 2020-04-11 PROCEDURE — 99233 SBSQ HOSP IP/OBS HIGH 50: CPT | Mod: GC,,,

## 2020-04-11 PROCEDURE — 94761 N-INVAS EAR/PLS OXIMETRY MLT: CPT

## 2020-04-11 PROCEDURE — 80053 COMPREHEN METABOLIC PANEL: CPT

## 2020-04-11 PROCEDURE — 84100 ASSAY OF PHOSPHORUS: CPT

## 2020-04-11 PROCEDURE — 63600175 PHARM REV CODE 636 W HCPCS: Performed by: STUDENT IN AN ORGANIZED HEALTH CARE EDUCATION/TRAINING PROGRAM

## 2020-04-11 RX ADMIN — ATORVASTATIN CALCIUM 20 MG: 20 TABLET, FILM COATED ORAL at 08:04

## 2020-04-11 RX ADMIN — ACETAMINOPHEN 650 MG: 325 TABLET ORAL at 04:04

## 2020-04-11 RX ADMIN — PIPERACILLIN AND TAZOBACTAM 4.5 G: 4; .5 INJECTION, POWDER, LYOPHILIZED, FOR SOLUTION INTRAVENOUS; PARENTERAL at 08:04

## 2020-04-11 RX ADMIN — INSULIN ASPART 7 UNITS: 100 INJECTION, SOLUTION INTRAVENOUS; SUBCUTANEOUS at 04:04

## 2020-04-11 RX ADMIN — INSULIN ASPART 7 UNITS: 100 INJECTION, SOLUTION INTRAVENOUS; SUBCUTANEOUS at 11:04

## 2020-04-11 RX ADMIN — Medication 6 MG: at 08:04

## 2020-04-11 RX ADMIN — INSULIN ASPART 5 UNITS: 100 INJECTION, SOLUTION INTRAVENOUS; SUBCUTANEOUS at 11:04

## 2020-04-11 RX ADMIN — OXYCODONE HYDROCHLORIDE 10 MG: 5 TABLET ORAL at 08:04

## 2020-04-11 RX ADMIN — Medication 1 TABLET: at 08:04

## 2020-04-11 RX ADMIN — HYDROXYZINE HYDROCHLORIDE 10 MG: 10 TABLET, FILM COATED ORAL at 08:04

## 2020-04-11 RX ADMIN — PIPERACILLIN AND TAZOBACTAM 4.5 G: 4; .5 INJECTION, POWDER, LYOPHILIZED, FOR SOLUTION INTRAVENOUS; PARENTERAL at 03:04

## 2020-04-11 RX ADMIN — VANCOMYCIN HYDROCHLORIDE 1000 MG: 1 INJECTION, POWDER, LYOPHILIZED, FOR SOLUTION INTRAVENOUS at 08:04

## 2020-04-11 RX ADMIN — OXYCODONE HYDROCHLORIDE 10 MG: 5 TABLET ORAL at 02:04

## 2020-04-11 RX ADMIN — INSULIN ASPART 2 UNITS: 100 INJECTION, SOLUTION INTRAVENOUS; SUBCUTANEOUS at 08:04

## 2020-04-11 RX ADMIN — PIPERACILLIN AND TAZOBACTAM 4.5 G: 4; .5 INJECTION, POWDER, LYOPHILIZED, FOR SOLUTION INTRAVENOUS; PARENTERAL at 05:04

## 2020-04-11 RX ADMIN — INSULIN ASPART 5 UNITS: 100 INJECTION, SOLUTION INTRAVENOUS; SUBCUTANEOUS at 08:04

## 2020-04-11 RX ADMIN — HYDROXYZINE HYDROCHLORIDE 10 MG: 10 TABLET, FILM COATED ORAL at 11:04

## 2020-04-11 RX ADMIN — INSULIN ASPART 7 UNITS: 100 INJECTION, SOLUTION INTRAVENOUS; SUBCUTANEOUS at 07:04

## 2020-04-11 NOTE — ASSESSMENT & PLAN NOTE
- HgbA1c 11%  - Insulin adjusted to 22U levemir QHS, 18U levemir AM, 7U aspart TID   - LDSII  - BG goal 140-180

## 2020-04-11 NOTE — SUBJECTIVE & OBJECTIVE
"Interval History: No acute overnight events. Patient reports that he wants to leave. He states that "nothing comes on time". BG in 200-400s.     Review of Systems   Constitutional: Positive for fatigue. Negative for fever.   HENT: Negative for congestion and sore throat.    Eyes: Negative for pain and visual disturbance.   Respiratory: Negative for cough and shortness of breath.    Cardiovascular: Negative for chest pain and leg swelling.   Gastrointestinal: Negative for abdominal pain, diarrhea, nausea and vomiting.   Genitourinary: Negative for difficulty urinating and dysuria.   Musculoskeletal: Negative for back pain and neck pain.   Neurological: Negative for syncope and headaches.   Psychiatric/Behavioral: Negative for behavioral problems and confusion.     Objective:     Vital Signs (Most Recent):  Temp: 98.3 °F (36.8 °C) (04/10/20 2008)  Pulse: 87 (04/11/20 0827)  Resp: 18 (04/11/20 0827)  BP: 113/60 (04/11/20 0827)  SpO2: 97 % (04/11/20 0827) Vital Signs (24h Range):  Temp:  [98 °F (36.7 °C)-98.4 °F (36.9 °C)] 98.3 °F (36.8 °C)  Pulse:  [83-89] 87  Resp:  [18-20] 18  SpO2:  [95 %-97 %] 97 %  BP: (110-121)/(59-71) 113/60     Weight: 59.5 kg (131 lb 2.8 oz)  Body mass index is 19.94 kg/m².    Intake/Output Summary (Last 24 hours) at 4/11/2020 0928  Last data filed at 4/11/2020 0230  Gross per 24 hour   Intake 1551 ml   Output 2125 ml   Net -574 ml      Physical Exam   Constitutional: He is oriented to person, place, and time. He appears well-developed. No distress.   Thin   HENT:   Head: Normocephalic and atraumatic.   Eyes: Pupils are equal, round, and reactive to light. EOM are normal.   Neck: Normal range of motion. No tracheal deviation present. No thyromegaly present.   Cardiovascular: Normal rate and normal heart sounds.   No murmur heard.  Pulmonary/Chest: Effort normal and breath sounds normal. No respiratory distress. He has no rales.   Abdominal: Bowel sounds are normal. He exhibits no distension. "   Musculoskeletal: Normal range of motion. He exhibits no edema.   Neurological: He is alert and oriented to person, place, and time. No cranial nerve deficit.   Skin: He is not diaphoretic.   Nursing note and vitals reviewed.       Significant Labs:   CBC:   Recent Labs   Lab 04/11/20  0401   WBC 6.68   HGB 8.7*   HCT 31.3*   *     CMP:   Recent Labs   Lab 04/11/20  0401   *   K 4.6   CL 99   CO2 26   *   BUN 21*   CREATININE 1.3   CALCIUM 8.3*   PROT 7.7   ALBUMIN 2.2*   BILITOT 0.3   ALKPHOS 71   AST 36   ALT 34   ANIONGAP 9   EGFRNONAA >60.0       Significant Imaging: I have reviewed all pertinent imaging results/findings within the past 24 hours.

## 2020-04-11 NOTE — PROGRESS NOTES
"[facility] Hospital Medicine  Progress Note    Patient Name: Cali Mabry  MRN: [id number]  Patient Class: IP- Inpatient   Admission Date: 3/26/2020  Length of Stay: 15 days  Attending Physician: Michoacano Holbrook MD  Primary Care Provider: Primary Doctor     Hospital Medicine Team: [facility] Ayanna Edward MD    Subjective:     Principal Problem:Acute respiratory failure with hypoxia    HPI:  Cali Mabry is a 54 y.o. with DM II, hepatitis C, HTN, polysubstance abuse/IVDU, pancreatitis who presents, after transfer from [facility], with a 2 day history of chest pain and shortness of breath.  Chest pain is centralized and just described as "painful." Pain aggravated with movement and deep breathing. He reports an intermittent cough that is non-productive. He denies sick contacts, fever, chills, sore throat. Upon questioning, patient stated "I have the virus so just treat me for it." Educated patient that testing is still in process. Inquired about IV drug use however he was unable to recall when he last used. Patient swatted examiner's hand after chest palpation, yelling "that hurts!" Patient became aggravated, reported his pain was now worse and requested pain medication. Per nurse, patient given PRN dose an hour prior. Patient encouraged to continue discussing his plan of care however stated, "I don't want to chat."   ED: Afebrile with leukocytosis. CTA chest from 3/26 with dense consolidative changes within the right lower lobe with internal cavitation containing air and fluid; Occlusion is seen of the right lower lobe bronchi which may be secondary to mucoid impaction or aspirated material; Additional multifocal opacities and cavitations.  Vancomycin and Zosyn administered. On 2L NC. VSS. Admitted to hospital medicine for further evaluation and COVID-19 rule out.        Overview/Hospital Course:  55 yo male with IVDU with Hep C, hx of MRSA (V sens), Pancreatitis, DM2, who presents " "as a transfer from [facility] for SOB and CP, CT + multifocal apical cavitations and opacities, assoc. w/ air +fluid, suspected due to MRSA pneumonia c/b parapneumonic empyema and possible septic embolization. covid -, s/d 3/28. Chest tube placed 3/30 qhs on TPA x 6 doses (2/6 doses on 3/30 and 3/29). CTS no intervention for now. TTE negative. Chest tube was pulled. ID's final recommendations included: plans todischarge with IV vanc + cefpedoxime or augmentin for 4-6 weeks total (end date 05/09/2020). Repeat imaging in 4 weeks and may require doxycyline following re imaging if worsening. If patient leaves AMA, can offer linezolid, but that would be suboptimal treatment.     Throughout hospital stay, patient's insulin was titrated to maintain -180.    Interval History: No acute overnight events. Patient reports that he wants to leave. He states that "nothing comes on time". BG in 200-400s.     Review of Systems   Constitutional: Positive for fatigue. Negative for fever.   HENT: Negative for congestion and sore throat.    Eyes: Negative for pain and visual disturbance.   Respiratory: Negative for cough and shortness of breath.    Cardiovascular: Negative for chest pain and leg swelling.   Gastrointestinal: Negative for abdominal pain, diarrhea, nausea and vomiting.   Genitourinary: Negative for difficulty urinating and dysuria.   Musculoskeletal: Negative for back pain and neck pain.   Neurological: Negative for syncope and headaches.   Psychiatric/Behavioral: Negative for behavioral problems and confusion.     Objective:     Vital Signs (Most Recent):  Temp: 98.3 °F (36.8 °C) (04/10/20 2008)  Pulse: 87 (04/11/20 0827)  Resp: 18 (04/11/20 0827)  BP: 113/60 (04/11/20 0827)  SpO2: 97 % (04/11/20 0827) Vital Signs (24h Range):  Temp:  [98 °F (36.7 °C)-98.4 °F (36.9 °C)] 98.3 °F (36.8 °C)  Pulse:  [83-89] 87  Resp:  [18-20] 18  SpO2:  [95 %-97 %] 97 %  BP: (110-121)/(59-71) 113/60     Weight: 59.5 kg (131 lb 2.8 " oz)  Body mass index is 19.94 kg/m².    Intake/Output Summary (Last 24 hours) at 4/11/2020 0928  Last data filed at 4/11/2020 0230  Gross per 24 hour   Intake 1551 ml   Output 2125 ml   Net -574 ml      Physical Exam   Constitutional: He is oriented to person, place, and time. He appears well-developed. No distress.   Thin   HENT:   Head: Normocephalic and atraumatic.   Eyes: Pupils are equal, round, and reactive to light. EOM are normal.   Neck: Normal range of motion. No tracheal deviation present. No thyromegaly present.   Cardiovascular: Normal rate and normal heart sounds.   No murmur heard.  Pulmonary/Chest: Effort normal and breath sounds normal. No respiratory distress. He has no rales.   Abdominal: Bowel sounds are normal. He exhibits no distension.   Musculoskeletal: Normal range of motion. He exhibits no edema.   Neurological: He is alert and oriented to person, place, and time. No cranial nerve deficit.   Skin: He is not diaphoretic.   Nursing note and vitals reviewed.       Significant Labs:   CBC:   Recent Labs   Lab 04/11/20  0401   WBC 6.68   HGB 8.7*   HCT 31.3*   *     CMP:   Recent Labs   Lab 04/11/20  0401   *   K 4.6   CL 99   CO2 26   *   BUN 21*   CREATININE 1.3   CALCIUM 8.3*   PROT 7.7   ALBUMIN 2.2*   BILITOT 0.3   ALKPHOS 71   AST 36   ALT 34   ANIONGAP 9   EGFRNONAA >60.0       Significant Imaging: I have reviewed all pertinent imaging results/findings within the past 24 hours.      Assessment/Plan:      * Acute respiratory failure with hypoxia  54M with IVDU with Hep C, hx of MRSA (V sens), Pancreatitis, DM2, who presents as a transfer from [facility] for multifocal apical cavitations and opacities, assoc. w/ air +fluid, suspected due to MRSA pneumonia c/b parapneumonic empyema and possible septic embolization. covid -, s/d 3/28   Remains stable on vanc and zosyn, cultures remain clear. Disposition- unable to place to  due to hx of IVDU. Patient does not desire  outside facility transfer.     RESOLVED as patient is stable on RA.    Plan:  - Recent Blood cultures remain negative, WBC count now coming down.    - Continue TB rule out, pending AFB samples. No acid fast bacilli on gram stain.   - Follow up with ID: continue broad spectrum therapy, on vanc and zosyn. STOP ceftriaxone on 4/3, per ID, lactobacillus in pleural fluid can be resistant, also with E Coli/Klebsiella.  -plans to discharge with IV vanc + cefpedoxime or augmentin for 4-6 weeks total (end date 05/09/2020). Repeat imaging in 4 weeks and may require doxycyline following re imaging if worsening.   - If patient leaves AMA, can offer linezolid, but would be suboptimal treatment.   - CTS consulted, appreciate assistance: unlikely to perform intervention as he has had good output and decrease on O2 requirements. TTE negative.  - TPA per pulmonology, received 5/5 doses with good output in chest tube (placed 03/30) and improvement in respiratory status. CT pulled.    Reactive thrombocytosis  Likely 2/2 infection  Or possible bleeding, please see anemia   Trend CBC    Iron deficiency anemia        Parapneumonic effusion  See above       Septic embolism  TTE negative  Neuro checks q4h.       Hyponatremia  Improved with blood sugar control and fluid hydration. Likely related to hyperglycemia as corrected Na within normal.   Component of decreased effective art volume    Continue to trend     Opioid abuse        Tobacco abuse        Chronic hepatitis C        Anemia of chronic disease        Intravenous drug abuse    -counseled on cessation      Hyperlipidemia  - continue statin       Normocytic normochromic anemia  Hb 6.5 on 4/4/2020 s/p transfusion x 1 prbc  Stable with appropriate response Hb.   No active bleeding  Type/cross  Iron studies consistent with Iron deficiency and ACD        Essential hypertension  Stable, not on meds       Type 2 diabetes mellitus, uncontrolled  - HgbA1c 11%  - Insulin adjusted to 22U  levemir QHS, 18U levemir AM, 7U aspart TID   - LDSII  - BG goal 140-180    Opioid dependence, continuous          Patient seen, and case discussed with staff, Dr. Holbrook. Attending attestation to follow.     VTE Risk Mitigation (From admission, onward)         Ordered     enoxaparin injection 40 mg  Daily      04/04/20 0800     IP VTE HIGH RISK PATIENT  Once      03/27/20 1511                Ayanna Edward MD  Department of Hospital Medicine   [facility]

## 2020-04-11 NOTE — PLAN OF CARE
Complaints of pain treated with PRN medications. Airborne precautions maintained. Plan to DC once TB results are back for outpatient placement for ABX therapy. Vancomycin and Zosyn orders continued. VSS. Pt denies SOB or chest pain. Pt doesn't have telemetry orders. Fall precautions maintained. Pt free of falls and injuries. POC discussed with patient/family, verbalized understanding. Questions answered, no distress at present.

## 2020-04-11 NOTE — PLAN OF CARE
A A O x 4. Free of falls, traumas and injuries. Skin intact. Denies shortness of breath, chest pain, nausea, vomiting. PRN hydralazine given x2 per MAR. PRN oxycodone given x1 per MAR. BG monitored ACHS. Patient educated on snacking in between meals. Patient verbalized understanding. Plan of care reviewed with patient. Vital signs stable. Pain monitored. Will continue to monitor.

## 2020-04-12 LAB
POCT GLUCOSE: 131 MG/DL (ref 70–110)
POCT GLUCOSE: 264 MG/DL (ref 70–110)
POCT GLUCOSE: 375 MG/DL (ref 70–110)
POCT GLUCOSE: 53 MG/DL (ref 70–110)
POCT GLUCOSE: 94 MG/DL (ref 70–110)
SPECIMEN SOURCE AND ORGANISM NAME: ABNORMAL
SUSCEPTIBILITY, AEROBIC BACTERIA: ABNORMAL

## 2020-04-12 PROCEDURE — 25000003 PHARM REV CODE 250: Performed by: STUDENT IN AN ORGANIZED HEALTH CARE EDUCATION/TRAINING PROGRAM

## 2020-04-12 PROCEDURE — 99233 SBSQ HOSP IP/OBS HIGH 50: CPT | Mod: GC,,,

## 2020-04-12 PROCEDURE — 63600175 PHARM REV CODE 636 W HCPCS: Performed by: STUDENT IN AN ORGANIZED HEALTH CARE EDUCATION/TRAINING PROGRAM

## 2020-04-12 PROCEDURE — 20600001 HC STEP DOWN PRIVATE ROOM

## 2020-04-12 PROCEDURE — 99233 PR SUBSEQUENT HOSPITAL CARE,LEVL III: ICD-10-PCS | Mod: GC,,,

## 2020-04-12 PROCEDURE — 25000003 PHARM REV CODE 250: Performed by: PHYSICIAN ASSISTANT

## 2020-04-12 PROCEDURE — 25000003 PHARM REV CODE 250: Performed by: INTERNAL MEDICINE

## 2020-04-12 RX ORDER — INSULIN ASPART 100 [IU]/ML
8 INJECTION, SOLUTION INTRAVENOUS; SUBCUTANEOUS
Status: DISCONTINUED | OUTPATIENT
Start: 2020-04-12 | End: 2020-04-22

## 2020-04-12 RX ORDER — OXYCODONE AND ACETAMINOPHEN 10; 325 MG/1; MG/1
1 TABLET ORAL EVERY 6 HOURS PRN
Status: DISCONTINUED | OUTPATIENT
Start: 2020-04-12 | End: 2020-04-22 | Stop reason: HOSPADM

## 2020-04-12 RX ADMIN — OXYCODONE HYDROCHLORIDE 10 MG: 5 TABLET ORAL at 06:04

## 2020-04-12 RX ADMIN — INSULIN ASPART 5 UNITS: 100 INJECTION, SOLUTION INTRAVENOUS; SUBCUTANEOUS at 11:04

## 2020-04-12 RX ADMIN — Medication 1 TABLET: at 09:04

## 2020-04-12 RX ADMIN — ATORVASTATIN CALCIUM 20 MG: 20 TABLET, FILM COATED ORAL at 09:04

## 2020-04-12 RX ADMIN — INSULIN ASPART 8 UNITS: 100 INJECTION, SOLUTION INTRAVENOUS; SUBCUTANEOUS at 11:04

## 2020-04-12 RX ADMIN — HYDROXYZINE HYDROCHLORIDE 10 MG: 10 TABLET, FILM COATED ORAL at 09:04

## 2020-04-12 RX ADMIN — HYDROXYZINE HYDROCHLORIDE 10 MG: 10 TABLET, FILM COATED ORAL at 07:04

## 2020-04-12 RX ADMIN — ACETAMINOPHEN 650 MG: 325 TABLET ORAL at 01:04

## 2020-04-12 RX ADMIN — PIPERACILLIN AND TAZOBACTAM 4.5 G: 4; .5 INJECTION, POWDER, LYOPHILIZED, FOR SOLUTION INTRAVENOUS; PARENTERAL at 05:04

## 2020-04-12 RX ADMIN — INSULIN ASPART 8 UNITS: 100 INJECTION, SOLUTION INTRAVENOUS; SUBCUTANEOUS at 04:04

## 2020-04-12 RX ADMIN — OXYCODONE AND ACETAMINOPHEN 1 TABLET: 10; 325 TABLET ORAL at 10:04

## 2020-04-12 RX ADMIN — OXYCODONE AND ACETAMINOPHEN 1 TABLET: 10; 325 TABLET ORAL at 05:04

## 2020-04-12 RX ADMIN — INSULIN ASPART 7 UNITS: 100 INJECTION, SOLUTION INTRAVENOUS; SUBCUTANEOUS at 07:04

## 2020-04-12 RX ADMIN — Medication 6 MG: at 10:04

## 2020-04-12 NOTE — ASSESSMENT & PLAN NOTE
- HgbA1c 11%  - Insulin adjusted to 22U levemir QHS, 20U levemir AM, 8U aspart TID   - LDSII  - BG goal 140-180

## 2020-04-12 NOTE — PROGRESS NOTES
Pharmacokinetic Assessment Follow Up: IV Vancomycin  · 4/11 trough resulted at 10.9 mcg/mL  · Below goal of 15-20 mcg/mL  · Current regimen: 1000 mg q24hr  · Plan to increase to 1250 mg q24hr  · Draw trough on 4/14 at 19:30 prior to 3rd dose    Drug levels (last 3 results):  Recent Labs   Lab Result Units 04/09/20  0804 04/11/20  1939   Vancomycin, Random ug/mL 21.6  --    Vancomycin-Trough ug/mL  --  10.9       Pharmacy will continue to follow and monitor vancomycin.    Please contact pharmacy at extension [phone] for questions regarding this assessment.    Thank you for the consult,   Viri Gonsalez       Patient brief summary:  Cali Mabry is a 54 y.o. male initiated on antimicrobial therapy with IV Vancomycin for treatment of septic embolism    Drug Allergies:   Review of patient's allergies indicates:  No Known Allergies    Actual Body Weight:   59.5 kg    Renal Function:   Estimated Creatinine Clearance: 55.3 mL/min (based on SCr of 1.3 mg/dL).,     Dialysis Method (if applicable):  N/A    CBC (last 72 hours):  Recent Labs   Lab Result Units 04/09/20  0349 04/11/20  0401   WBC K/uL 7.39 6.68   Hemoglobin g/dL 8.9* 8.7*   Hematocrit % 30.5* 31.3*   Platelets K/uL 568* 479*   Gran% % 53.1 51.6   Lymph% % 32.6 35.5   Mono% % 11.1 10.2   Eosinophil% % 1.1 0.9   Basophil% % 0.5 0.6   Differential Method  Automated Automated       Metabolic Panel (last 72 hours):  Recent Labs   Lab Result Units 04/09/20  0348 04/11/20  0401   Sodium mmol/L 135* 134*   Potassium mmol/L 3.9 4.6   Chloride mmol/L 98 99   CO2 mmol/L 27 26   Glucose mg/dL 185* 318*   BUN, Bld mg/dL 23* 21*   Creatinine mg/dL 1.2 1.3   Albumin g/dL 2.1* 2.2*   Total Bilirubin mg/dL 0.3 0.3   Alkaline Phosphatase U/L 74 71   AST U/L 27 36   ALT U/L 26 34   Magnesium mg/dL 2.2 2.2   Phosphorus mg/dL 2.8 2.9       Vancomycin Administrations:  vancomycin given in the last 96 hours                   vancomycin in dextrose 5 % 1 gram/250 mL IVPB 1,000 mg  (mg) 1,000 mg New Bag 04/11/20 2016     1,000 mg New Bag 04/10/20 2009     1,000 mg New Bag 04/09/20 2003                Microbiologic Results:  Microbiology Results (last 7 days)     Procedure Component Value Units Date/Time    Susceptibility, Aerobic Bacteria  RYAN [[id number]] Collected:  04/09/20 1206    Order Status:  Completed Updated:  04/09/20 1209     Specimen Source and Organism Name pleural fluid and Lactobacillus rahamnos    Blood culture [[id number]] Collected:  04/01/20 1328    Order Status:  Completed Specimen:  Blood Updated:  04/05/20 1554     Blood Culture, Routine No growth after 4 days    Narrative:       Aerobic and anaerobic from site 2    Blood culture [[id number]] Collected:  04/01/20 1328    Order Status:  Completed Specimen:  Blood Updated:  04/05/20 1553     Blood Culture, Routine No growth after 4 days    Narrative:       Aerobic and anaerobic from site 1

## 2020-04-12 NOTE — PLAN OF CARE
AAOX4, NAD, VSS. Pt refused temp and midnight vitals. No complaints of pain or SOB. Fall Protocol implemented, pt understands when to use call light. Pt refused midnight and AM vitals. Fluids encouraged. Diabetic Managament encouraged. POC Reviewed with pt. Will continue to monitor.

## 2020-04-12 NOTE — NURSING
PCT attempted to collect midnight vitals. Pt became belligerent and refused vitals. NAD distress noted. Will continue to monitor and attempt 0400 vitals.

## 2020-04-12 NOTE — SUBJECTIVE & OBJECTIVE
Interval History: Overnight, patient pulled out his midline. Initially refused peripheral IV but this morning is agreeable to it. He states that he would like to leave the hospital.    Review of Systems   Constitutional: Positive for fatigue. Negative for fever.   HENT: Negative for congestion and sore throat.    Eyes: Negative for pain and visual disturbance.   Respiratory: Negative for cough and shortness of breath.    Cardiovascular: Negative for chest pain and leg swelling.   Gastrointestinal: Negative for abdominal pain, diarrhea, nausea and vomiting.   Genitourinary: Negative for difficulty urinating and dysuria.   Musculoskeletal: Negative for back pain and neck pain.   Neurological: Negative for syncope and headaches.   Psychiatric/Behavioral: Negative for behavioral problems and confusion.     Objective:     Vital Signs (Most Recent):  Temp: 98.6 °F (37 °C) (04/12/20 0929)  Pulse: 81 (04/12/20 0929)  Resp: 18 (04/12/20 0929)  BP: 134/75 (04/12/20 0929)  SpO2: 97 % (04/12/20 0929) Vital Signs (24h Range):  Temp:  [97 °F (36.1 °C)-98.6 °F (37 °C)] 98.6 °F (37 °C)  Pulse:  [81-94] 81  Resp:  [16-20] 18  SpO2:  [94 %-99 %] 97 %  BP: (104-134)/(60-75) 134/75     Weight: 59.5 kg (131 lb 2.8 oz)  Body mass index is 19.94 kg/m².    Intake/Output Summary (Last 24 hours) at 4/12/2020 0904  Last data filed at 4/12/2020 0539  Gross per 24 hour   Intake 720 ml   Output 1025 ml   Net -305 ml      Physical Exam   Constitutional: He is oriented to person, place, and time. He appears well-developed. No distress.   Thin   HENT:   Head: Normocephalic and atraumatic.   Eyes: Pupils are equal, round, and reactive to light. EOM are normal.   Neck: Normal range of motion. No tracheal deviation present. No thyromegaly present.   Cardiovascular: Normal rate and normal heart sounds.   No murmur heard.  Pulmonary/Chest: Effort normal and breath sounds normal. No respiratory distress. He has no rales.   Abdominal: Bowel sounds are  normal. He exhibits no distension.   Musculoskeletal: Normal range of motion. He exhibits no edema.   Neurological: He is alert and oriented to person, place, and time. No cranial nerve deficit.   Skin: He is not diaphoretic.   Nursing note and vitals reviewed.      Significant Labs:   CBC:   Recent Labs   Lab 04/11/20  0401   WBC 6.68   HGB 8.7*   HCT 31.3*   *     CMP:   Recent Labs   Lab 04/11/20  0401   *   K 4.6   CL 99   CO2 26   *   BUN 21*   CREATININE 1.3   CALCIUM 8.3*   PROT 7.7   ALBUMIN 2.2*   BILITOT 0.3   ALKPHOS 71   AST 36   ALT 34   ANIONGAP 9   EGFRNONAA >60.0       Significant Imaging: I have reviewed all pertinent imaging results/findings within the past 24 hours.

## 2020-04-12 NOTE — PLAN OF CARE
A A O x 4. Free of falls, traumas and injuries. Skin intact. Denies shortness of breath, chest pain, nausea, vomiting. Patient pulled out midline this AM, have not been able to get a PIV throughout shift. Patient has been stuck by 4 different nurses. Anesthesia paged. No response yet. Patient has not received pipercillin from 1300 today. Plan of care reviewed with patient. Vital signs stable. Pain monitored. Will continue to monitor.

## 2020-04-12 NOTE — NURSING
"Upon administering pain medication, pt explained that he pulled out his Midline. Site intact. No complaints of pain. No bleeding or heamtoma at site. Explained to pt that he needs an IV access and he began to become loud stating "I DO NOT WANT TO BE STUCK AGAIN". Dr. Healy with [facility]1 Notified of Pt status. Awaiting orders for another Midline placement.  "

## 2020-04-12 NOTE — PROGRESS NOTES
"[facility] Hospital Medicine  Progress Note    Patient Name: Cali Mabry  MRN: [id number]  Patient Class: IP- Inpatient   Admission Date: 3/26/2020  Length of Stay: 16 days  Attending Physician: Michoacano Holbrook MD  Primary Care Provider: Primary Doctor     Hospital Medicine Team: [facility] MED 1 Ayanna Edward MD    Subjective:     Principal Problem:Acute respiratory failure with hypoxia    HPI:  Cali Mabry is a 54 y.o. with DM II, hepatitis C, HTN, polysubstance abuse/IVDU, pancreatitis who presents, after transfer from [facility], with a 2 day history of chest pain and shortness of breath.  Chest pain is centralized and just described as "painful." Pain aggravated with movement and deep breathing. He reports an intermittent cough that is non-productive. He denies sick contacts, fever, chills, sore throat. Upon questioning, patient stated "I have the virus so just treat me for it." Educated patient that testing is still in process. Inquired about IV drug use however he was unable to recall when he last used. Patient swatted examiner's hand after chest palpation, yelling "that hurts!" Patient became aggravated, reported his pain was now worse and requested pain medication. Per nurse, patient given PRN dose an hour prior. Patient encouraged to continue discussing his plan of care however stated, "I don't want to chat."   ED: Afebrile with leukocytosis. CTA chest from 3/26 with dense consolidative changes within the right lower lobe with internal cavitation containing air and fluid; Occlusion is seen of the right lower lobe bronchi which may be secondary to mucoid impaction or aspirated material; Additional multifocal opacities and cavitations.  Vancomycin and Zosyn administered. On 2L NC. VSS. Admitted to hospital medicine for further evaluation and COVID-19 rule out.        Overview/Hospital Course:  53 yo male with IVDU with Hep C, hx of MRSA (V sens), Pancreatitis, DM2, who presents " as a transfer from [facility] for SOB and CP, CT + multifocal apical cavitations and opacities, assoc. w/ air +fluid, suspected due to MRSA pneumonia c/b parapneumonic empyema and possible septic embolization. covid -, s/d 3/28. Chest tube placed 3/30 qhs on TPA x 6 doses (2/6 doses on 3/30 and 3/29). CTS no intervention for now. TTE negative. Chest tube was pulled. ID's final recommendations included: plans todischarge with IV vanc + cefpedoxime or augmentin for 4-6 weeks total (end date 05/09/2020). Repeat imaging in 4 weeks and may require doxycyline following re imaging if worsening. If patient leaves AMA, can offer linezolid, but that would be suboptimal treatment.     Throughout hospital stay, patient's insulin was titrated to maintain -180.    Interval History: Overnight, patient pulled out his midline. Initially refused peripheral IV but this morning is agreeable to it. He states that he would like to leave the hospital.    Review of Systems   Constitutional: Positive for fatigue. Negative for fever.   HENT: Negative for congestion and sore throat.    Eyes: Negative for pain and visual disturbance.   Respiratory: Negative for cough and shortness of breath.    Cardiovascular: Negative for chest pain and leg swelling.   Gastrointestinal: Negative for abdominal pain, diarrhea, nausea and vomiting.   Genitourinary: Negative for difficulty urinating and dysuria.   Musculoskeletal: Negative for back pain and neck pain.   Neurological: Negative for syncope and headaches.   Psychiatric/Behavioral: Negative for behavioral problems and confusion.     Objective:     Vital Signs (Most Recent):  Temp: 98.6 °F (37 °C) (04/12/20 0929)  Pulse: 81 (04/12/20 0929)  Resp: 18 (04/12/20 0929)  BP: 134/75 (04/12/20 0929)  SpO2: 97 % (04/12/20 0929) Vital Signs (24h Range):  Temp:  [97 °F (36.1 °C)-98.6 °F (37 °C)] 98.6 °F (37 °C)  Pulse:  [81-94] 81  Resp:  [16-20] 18  SpO2:  [94 %-99 %] 97 %  BP: (104-134)/(60-75) 134/75      Weight: 59.5 kg (131 lb 2.8 oz)  Body mass index is 19.94 kg/m².    Intake/Output Summary (Last 24 hours) at 4/12/2020 0943  Last data filed at 4/12/2020 0539  Gross per 24 hour   Intake 720 ml   Output 1025 ml   Net -305 ml      Physical Exam   Constitutional: He is oriented to person, place, and time. He appears well-developed. No distress.   Thin   HENT:   Head: Normocephalic and atraumatic.   Eyes: Pupils are equal, round, and reactive to light. EOM are normal.   Neck: Normal range of motion. No tracheal deviation present. No thyromegaly present.   Cardiovascular: Normal rate and normal heart sounds.   No murmur heard.  Pulmonary/Chest: Effort normal and breath sounds normal. No respiratory distress. He has no rales.   Abdominal: Bowel sounds are normal. He exhibits no distension.   Musculoskeletal: Normal range of motion. He exhibits no edema.   Neurological: He is alert and oriented to person, place, and time. No cranial nerve deficit.   Skin: He is not diaphoretic.   Nursing note and vitals reviewed.      Significant Labs:   CBC:   Recent Labs   Lab 04/11/20  0401   WBC 6.68   HGB 8.7*   HCT 31.3*   *     CMP:   Recent Labs   Lab 04/11/20  0401   *   K 4.6   CL 99   CO2 26   *   BUN 21*   CREATININE 1.3   CALCIUM 8.3*   PROT 7.7   ALBUMIN 2.2*   BILITOT 0.3   ALKPHOS 71   AST 36   ALT 34   ANIONGAP 9   EGFRNONAA >60.0       Significant Imaging: I have reviewed all pertinent imaging results/findings within the past 24 hours.      Assessment/Plan:      * Acute respiratory failure with hypoxia  54M with IVDU with Hep C, hx of MRSA (V sens), Pancreatitis, DM2, who presents as a transfer from [facility] for multifocal apical cavitations and opacities, assoc. w/ air +fluid, suspected due to MRSA pneumonia c/b parapneumonic empyema and possible septic embolization. covid -, s/d 3/28   Remains stable on vanc and zosyn, cultures remain clear. Disposition- unable to place to  due to hx of IVDU.  Patient does not desire outside facility transfer.     RESOLVED as patient is stable on RA.    Plan:  - Recent Blood cultures remain negative, WBC count now coming down.    - Continue TB rule out, pending AFB samples. No acid fast bacilli on gram stain.   - Follow up with ID: continue broad spectrum therapy, on vanc and zosyn. STOP ceftriaxone on 4/3, per ID, lactobacillus in pleural fluid can be resistant, also with E Coli/Klebsiella.  -plans to discharge with IV vanc + cefpedoxime or augmentin for 4-6 weeks total (end date 05/09/2020). Repeat imaging in 4 weeks and may require doxycyline following re imaging if worsening.   - If patient leaves AMA, can offer linezolid, but would be suboptimal treatment.   - CTS consulted, appreciate assistance: unlikely to perform intervention as he has had good output and decrease on O2 requirements. TTE negative.  - TPA per pulmonology, received 5/5 doses with good output in chest tube (placed 03/30) and improvement in respiratory status. CT pulled.    Reactive thrombocytosis  Likely 2/2 infection  Or possible bleeding, please see anemia   Trend CBC    Iron deficiency anemia        Parapneumonic effusion  See above       Septic embolism  TTE negative  Neuro checks q4h.       Hyponatremia  Improved with blood sugar control and fluid hydration. Likely related to hyperglycemia as corrected Na within normal.   Component of decreased effective art volume    Continue to trend     Opioid abuse        Tobacco abuse        Chronic hepatitis C        Anemia of chronic disease        Intravenous drug abuse    -counseled on cessation      Hyperlipidemia  - continue statin       Normocytic normochromic anemia  Hb 6.5 on 4/4/2020 s/p transfusion x 1 prbc  Stable with appropriate response Hb.   No active bleeding  Type/cross  Iron studies consistent with Iron deficiency and ACD        Essential hypertension  Stable, not on meds       Type 2 diabetes mellitus, uncontrolled  - HgbA1c 11%  -  Insulin adjusted to 22U levemir QHS, 20U levemir AM, 8U aspart TID   - LDSII  - BG goal 140-180    Opioid dependence, continuous          Patient seen, and case to be discussed with staff, Dr. Holbrook. Attending attestation to follow.    VTE Risk Mitigation (From admission, onward)         Ordered     enoxaparin injection 40 mg  Daily      04/04/20 0800     IP VTE HIGH RISK PATIENT  Once      03/27/20 0501                  Ayanna Edward MD  Department of Hospital Medicine   [facility]

## 2020-04-13 LAB
ALBUMIN SERPL BCP-MCNC: 2.6 G/DL (ref 3.5–5.2)
ALP SERPL-CCNC: 76 U/L (ref 55–135)
ALT SERPL W/O P-5'-P-CCNC: 52 U/L (ref 10–44)
ANION GAP SERPL CALC-SCNC: 10 MMOL/L (ref 8–16)
AST SERPL-CCNC: 47 U/L (ref 10–40)
BASOPHILS # BLD AUTO: 0.03 K/UL (ref 0–0.2)
BASOPHILS NFR BLD: 0.4 % (ref 0–1.9)
BILIRUB SERPL-MCNC: 0.2 MG/DL (ref 0.1–1)
BUN SERPL-MCNC: 25 MG/DL (ref 6–20)
CALCIUM SERPL-MCNC: 9.3 MG/DL (ref 8.7–10.5)
CHLORIDE SERPL-SCNC: 101 MMOL/L (ref 95–110)
CO2 SERPL-SCNC: 25 MMOL/L (ref 23–29)
CREAT SERPL-MCNC: 1.2 MG/DL (ref 0.5–1.4)
DIFFERENTIAL METHOD: ABNORMAL
EOSINOPHIL # BLD AUTO: 0.1 K/UL (ref 0–0.5)
EOSINOPHIL NFR BLD: 0.8 % (ref 0–8)
ERYTHROCYTE [DISTWIDTH] IN BLOOD BY AUTOMATED COUNT: 17.5 % (ref 11.5–14.5)
EST. GFR  (AFRICAN AMERICAN): >60 ML/MIN/1.73 M^2
EST. GFR  (NON AFRICAN AMERICAN): >60 ML/MIN/1.73 M^2
GLUCOSE SERPL-MCNC: 98 MG/DL (ref 70–110)
HCT VFR BLD AUTO: 33.2 % (ref 40–54)
HGB BLD-MCNC: 9.9 G/DL (ref 14–18)
IMM GRANULOCYTES # BLD AUTO: 0.1 K/UL (ref 0–0.04)
IMM GRANULOCYTES NFR BLD AUTO: 1.3 % (ref 0–0.5)
LYMPHOCYTES # BLD AUTO: 2.2 K/UL (ref 1–4.8)
LYMPHOCYTES NFR BLD: 28.4 % (ref 18–48)
MCH RBC QN AUTO: 26.2 PG (ref 27–31)
MCHC RBC AUTO-ENTMCNC: 29.8 G/DL (ref 32–36)
MCV RBC AUTO: 88 FL (ref 82–98)
MONOCYTES # BLD AUTO: 0.8 K/UL (ref 0.3–1)
MONOCYTES NFR BLD: 10.2 % (ref 4–15)
NEUTROPHILS # BLD AUTO: 4.5 K/UL (ref 1.8–7.7)
NEUTROPHILS NFR BLD: 58.9 % (ref 38–73)
NRBC BLD-RTO: 0 /100 WBC
PLATELET # BLD AUTO: 453 K/UL (ref 150–350)
PMV BLD AUTO: 9.6 FL (ref 9.2–12.9)
POCT GLUCOSE: 107 MG/DL (ref 70–110)
POCT GLUCOSE: 145 MG/DL (ref 70–110)
POCT GLUCOSE: 178 MG/DL (ref 70–110)
POCT GLUCOSE: 207 MG/DL (ref 70–110)
POCT GLUCOSE: 306 MG/DL (ref 70–110)
POCT GLUCOSE: 68 MG/DL (ref 70–110)
POTASSIUM SERPL-SCNC: 4.5 MMOL/L (ref 3.5–5.1)
PROT SERPL-MCNC: 8.6 G/DL (ref 6–8.4)
RBC # BLD AUTO: 3.78 M/UL (ref 4.6–6.2)
SODIUM SERPL-SCNC: 136 MMOL/L (ref 136–145)
WBC # BLD AUTO: 7.56 K/UL (ref 3.9–12.7)

## 2020-04-13 PROCEDURE — 63600175 PHARM REV CODE 636 W HCPCS

## 2020-04-13 PROCEDURE — 36415 COLL VENOUS BLD VENIPUNCTURE: CPT

## 2020-04-13 PROCEDURE — 25000003 PHARM REV CODE 250: Performed by: STUDENT IN AN ORGANIZED HEALTH CARE EDUCATION/TRAINING PROGRAM

## 2020-04-13 PROCEDURE — C1751 CATH, INF, PER/CENT/MIDLINE: HCPCS

## 2020-04-13 PROCEDURE — 99233 PR SUBSEQUENT HOSPITAL CARE,LEVL III: ICD-10-PCS | Mod: GC,,,

## 2020-04-13 PROCEDURE — 76937 US GUIDE VASCULAR ACCESS: CPT

## 2020-04-13 PROCEDURE — 25000003 PHARM REV CODE 250: Performed by: INTERNAL MEDICINE

## 2020-04-13 PROCEDURE — 20600001 HC STEP DOWN PRIVATE ROOM

## 2020-04-13 PROCEDURE — 36410 VNPNXR 3YR/> PHY/QHP DX/THER: CPT

## 2020-04-13 PROCEDURE — 99233 SBSQ HOSP IP/OBS HIGH 50: CPT | Mod: GC,,,

## 2020-04-13 PROCEDURE — 63600175 PHARM REV CODE 636 W HCPCS: Performed by: STUDENT IN AN ORGANIZED HEALTH CARE EDUCATION/TRAINING PROGRAM

## 2020-04-13 PROCEDURE — 25000003 PHARM REV CODE 250: Performed by: PHYSICIAN ASSISTANT

## 2020-04-13 PROCEDURE — 85025 COMPLETE CBC W/AUTO DIFF WBC: CPT

## 2020-04-13 PROCEDURE — 80053 COMPREHEN METABOLIC PANEL: CPT

## 2020-04-13 PROCEDURE — 97116 GAIT TRAINING THERAPY: CPT

## 2020-04-13 RX ADMIN — HYDROXYZINE HYDROCHLORIDE 10 MG: 10 TABLET, FILM COATED ORAL at 03:04

## 2020-04-13 RX ADMIN — OXYCODONE AND ACETAMINOPHEN 1 TABLET: 10; 325 TABLET ORAL at 09:04

## 2020-04-13 RX ADMIN — Medication 6 MG: at 09:04

## 2020-04-13 RX ADMIN — INSULIN ASPART 8 UNITS: 100 INJECTION, SOLUTION INTRAVENOUS; SUBCUTANEOUS at 04:04

## 2020-04-13 RX ADMIN — INSULIN ASPART 2 UNITS: 100 INJECTION, SOLUTION INTRAVENOUS; SUBCUTANEOUS at 09:04

## 2020-04-13 RX ADMIN — HYDROXYZINE HYDROCHLORIDE 10 MG: 10 TABLET, FILM COATED ORAL at 08:04

## 2020-04-13 RX ADMIN — PIPERACILLIN AND TAZOBACTAM 4.5 G: 4; .5 INJECTION, POWDER, LYOPHILIZED, FOR SOLUTION INTRAVENOUS; PARENTERAL at 12:04

## 2020-04-13 RX ADMIN — ATORVASTATIN CALCIUM 20 MG: 20 TABLET, FILM COATED ORAL at 09:04

## 2020-04-13 RX ADMIN — OXYCODONE AND ACETAMINOPHEN 1 TABLET: 10; 325 TABLET ORAL at 04:04

## 2020-04-13 RX ADMIN — INSULIN ASPART 8 UNITS: 100 INJECTION, SOLUTION INTRAVENOUS; SUBCUTANEOUS at 08:04

## 2020-04-13 RX ADMIN — PIPERACILLIN AND TAZOBACTAM 4.5 G: 4; .5 INJECTION, POWDER, LYOPHILIZED, FOR SOLUTION INTRAVENOUS; PARENTERAL at 09:04

## 2020-04-13 RX ADMIN — INSULIN DETEMIR 18 UNITS: 100 INJECTION, SOLUTION SUBCUTANEOUS at 09:04

## 2020-04-13 RX ADMIN — INSULIN ASPART 8 UNITS: 100 INJECTION, SOLUTION INTRAVENOUS; SUBCUTANEOUS at 12:04

## 2020-04-13 RX ADMIN — VANCOMYCIN HYDROCHLORIDE 1250 MG: 1.25 INJECTION, POWDER, LYOPHILIZED, FOR SOLUTION INTRAVENOUS at 09:04

## 2020-04-13 RX ADMIN — Medication 1 TABLET: at 09:04

## 2020-04-13 NOTE — PLAN OF CARE
Plan of care discussed with patient. Patient is free of fall/trauma/injury. Denies CP, SOB, or pain/discomfort. Bed in lowest position, call light within reach. Pt had Midline placed today in left forearm. All questions addressed. Will continue to monitor

## 2020-04-13 NOTE — PLAN OF CARE
Problem: Physical Therapy Goal  Goal: Physical Therapy Goal  Description  Goals to be met by: 2020     Patient will increase functional independence with mobility by performin. Sit to stand transfer with [place] -met   2. Gait  x 100 feet with Supervision without AD.  3. Lower extremity exercise program x15 reps, with supervision, in order to increase LE strength and (I) with functional mobility.        Outcome: Met   Pt is being discharged from PT. Pt is safe to ambulate in room Marilou Grissom PT  2020

## 2020-04-13 NOTE — PROGRESS NOTES
"[facility] Hospital Medicine  Progress Note    Patient Name: Cali Mabry  MRN: [id number]  Patient Class: IP- Inpatient   Admission Date: 3/26/2020  Length of Stay: 17 days  Attending Physician: Michoacano Holbrook MD  Primary Care Provider: Primary Doctor     Hospital Medicine Team: [facility] HOSP MED 1 Ayanna Edward MD    Subjective:     Principal Problem:Acute respiratory failure with hypoxia    HPI:  Cali Mabry is a 54 y.o. with DM II, hepatitis C, HTN, polysubstance abuse/IVDU, pancreatitis who presents, after transfer from [facility], with a 2 day history of chest pain and shortness of breath.  Chest pain is centralized and just described as "painful." Pain aggravated with movement and deep breathing. He reports an intermittent cough that is non-productive. He denies sick contacts, fever, chills, sore throat. Upon questioning, patient stated "I have the virus so just treat me for it." Educated patient that testing is still in process. Inquired about IV drug use however he was unable to recall when he last used. Patient swatted examiner's hand after chest palpation, yelling "that hurts!" Patient became aggravated, reported his pain was now worse and requested pain medication. Per nurse, patient given PRN dose an hour prior. Patient encouraged to continue discussing his plan of care however stated, "I don't want to chat."   ED: Afebrile with leukocytosis. CTA chest from 3/26 with dense consolidative changes within the right lower lobe with internal cavitation containing air and fluid; Occlusion is seen of the right lower lobe bronchi which may be secondary to mucoid impaction or aspirated material; Additional multifocal opacities and cavitations.  Vancomycin and Zosyn administered. On 2L NC. VSS. Admitted to hospital medicine for further evaluation and COVID-19 rule out.        Overview/Hospital Course:  53 yo male with IVDU with Hep C, hx of MRSA (V sens), Pancreatitis, DM2, who presents " as a transfer from [facility] for SOB and CP, CT + multifocal apical cavitations and opacities, assoc. w/ air +fluid, suspected due to MRSA pneumonia c/b parapneumonic empyema and possible septic embolization. covid -, s/d 3/28. Chest tube placed 3/30 qhs on TPA x 6 doses (2/6 doses on 3/30 and 3/29). CTS no intervention for now. TTE negative. Chest tube was pulled. ID's final recommendations included: plans todischarge with IV vanc + cefpedoxime or augmentin for 4-6 weeks total (end date 05/09/2020). Repeat imaging in 4 weeks and may require doxycyline following re imaging if worsening. If patient leaves AMA, can offer linezolid, but that would be suboptimal treatment.     Throughout hospital stay, patient's insulin was titrated to maintain -180.    Interval History: Patient did not have IV access yesterday. Plan for midline today. Glucose was low overnight but improved. He states that his pain is under better control.      Review of Systems   Constitutional: Positive for fatigue. Negative for fever.   HENT: Negative for congestion and sore throat.    Eyes: Negative for pain and visual disturbance.   Respiratory: Negative for cough and shortness of breath.    Cardiovascular: Negative for chest pain and leg swelling.   Gastrointestinal: Negative for abdominal pain, diarrhea, nausea and vomiting.   Genitourinary: Negative for difficulty urinating and dysuria.   Musculoskeletal: Negative for back pain and neck pain.   Neurological: Negative for syncope and headaches.   Psychiatric/Behavioral: Negative for behavioral problems and confusion.     Objective:     Vital Signs (Most Recent):  Temp: 98.3 °F (36.8 °C) (04/13/20 0758)  Pulse: 98 (04/13/20 0758)  Resp: 18 (04/13/20 0758)  BP: 119/65 (04/13/20 0549)  SpO2: 98 % (04/13/20 0758) Vital Signs (24h Range):  Temp:  [96.2 °F (35.7 °C)-98.4 °F (36.9 °C)] 98.3 °F (36.8 °C)  Pulse:  [76-98] 98  Resp:  [18-20] 18  SpO2:  [96 %-99 %] 98 %  BP: (109-125)/(65-71) 119/65      Weight: 59.5 kg (131 lb 2.8 oz)  Body mass index is 19.94 kg/m².    Intake/Output Summary (Last 24 hours) at 4/13/2020 0942  Last data filed at 4/12/2020 2100  Gross per 24 hour   Intake 462 ml   Output 0 ml   Net 462 ml      Physical Exam   Constitutional: He is oriented to person, place, and time. He appears well-developed. No distress.   Thin   HENT:   Head: Normocephalic and atraumatic.   Eyes: Pupils are equal, round, and reactive to light. EOM are normal.   Neck: Normal range of motion. No tracheal deviation present. No thyromegaly present.   Cardiovascular: Normal rate and normal heart sounds.   No murmur heard.  Pulmonary/Chest: Effort normal and breath sounds normal. No respiratory distress. He has no rales.   Abdominal: Bowel sounds are normal. He exhibits no distension.   Musculoskeletal: Normal range of motion. He exhibits no edema.   Neurological: He is alert and oriented to person, place, and time. No cranial nerve deficit.   Skin: He is not diaphoretic.   Nursing note and vitals reviewed.      Significant Labs:   CBC:   Recent Labs   Lab 04/13/20  0546   WBC 7.56   HGB 9.9*   HCT 33.2*   *     CMP:   Recent Labs   Lab 04/13/20  0546      K 4.5      CO2 25   GLU 98   BUN 25*   CREATININE 1.2   CALCIUM 9.3   PROT 8.6*   ALBUMIN 2.6*   BILITOT 0.2   ALKPHOS 76   AST 47*   ALT 52*   ANIONGAP 10   EGFRNONAA >60.0       Significant Imaging: I have reviewed all pertinent imaging results/findings within the past 24 hours.      Assessment/Plan:      * Acute respiratory failure with hypoxia  54M with IVDU with Hep C, hx of MRSA (V sens), Pancreatitis, DM2, who presents as a transfer from [facility] [place] for multifocal apical cavitations and opacities, assoc. w/ air +fluid, suspected due to MRSA pneumonia c/b parapneumonic empyema and possible septic embolization. covid -, s/d 3/28   Remains stable on vanc and zosyn, cultures remain clear. Disposition- unable to place to  due to hx of IVDU.  Patient does not desire outside facility transfer.     RESOLVED as patient is stable on RA.    Plan:  - Recent Blood cultures remain negative, WBC count normal.    - Continue TB rule out, pending AFB samples. No acid fast bacilli on gram stain.   - Follow up with ID: continue broad spectrum therapy, on vanc and zosyn. STOP ceftriaxone on 4/3, per ID, lactobacillus in pleural fluid can be resistant, also with E Coli/Klebsiella.  -plans to discharge with IV vanc + cefpedoxime or augmentin for 4-6 weeks total (end date 05/09/2020). Repeat imaging in 4 weeks and may require doxycyline following re imaging if worsening.   - If patient leaves AMA, can offer linezolid, but would be suboptimal treatment.   - CTS consulted, appreciate assistance: unlikely to perform intervention as he has had good output and decrease on O2 requirements. TTE negative.  - TPA per pulmonology, received 5/5 doses with good output in chest tube (placed 03/30) and improvement in respiratory status. CT pulled.    Reactive thrombocytosis  Likely 2/2 infection  Or possible bleeding, please see anemia   Trend CBC    Iron deficiency anemia        Parapneumonic effusion  See above       Septic embolism  TTE negative  Neuro checks q4h.       Hyponatremia  Improved with blood sugar control and fluid hydration. Likely related to hyperglycemia as corrected Na within normal.   Component of decreased effective art volume    Continue to trend     Opioid abuse  Cautious pain control. Continue Percocet 10mg Q6H PRN.      Tobacco abuse        Chronic hepatitis C        Anemia of chronic disease        Intravenous drug abuse    -counseled on cessation      Hyperlipidemia  - continue statin       Normocytic normochromic anemia  Hb 6.5 on 4/4/2020 s/p transfusion x 1 prbc  Stable with appropriate response Hb.   No active bleeding  Type/cross  Iron studies consistent with Iron deficiency and ACD        Essential hypertension  Stable, not on meds       Type 2 diabetes  mellitus, uncontrolled  - HgbA1c 11%  - Insulin adjusted to 20U levemir QHS, 18U levemir AM, 8U aspart TID   - LDSII  - BG goal 140-180    Opioid dependence, continuous          Patient seen, and case discussed with staff, Dr. Holbrook. Attending attestation to follow.    VTE Risk Mitigation (From admission, onward)         Ordered     enoxaparin injection 40 mg  Daily      04/04/20 0800     IP VTE HIGH RISK PATIENT  Once      03/27/20 6011                  Ayanna Edward MD  Department of Hospital Medicine   [facility]

## 2020-04-13 NOTE — PT/OT/SLP PROGRESS
Physical Therapy Treatment /Discharge    Patient Name:  Cali Mabry   MRN:  [id number]    Recommendations:     Discharge Recommendations:  (home no needs)   Discharge Equipment Recommendations: none   Barriers to discharge: None    Assessment:     Cali Mabry is a 54 y.o. male admitted with a medical diagnosis of Acute respiratory failure with hypoxia.  He presents with the following impairments/functional limitations:  (no rehab needs.) pt lynn treatment well and pain limited functional mobility. Pt does not need cont skilled PT and is safe to discharge home with no needs.     Rehab Prognosis: Good; patient would benefit from acute skilled PT services to address these deficits and reach maximum level of function.    Recent Surgery: * No surgery found *      Plan:     During this hospitalization, patient to be seen (pt is being discharged from PT) to address the identified rehab impairments via (pt is safe to ambulate in room. ) and progress toward the following goals:    · Plan of Care Expires:  04/13/20    Subjective     Chief Complaint: pt c/o pain in L hip.   Patient/Family Comments/goals: to get better and go home.   Pain/Comfort:  · Pain Rating 1: 8/10(L hip. pt stated that he hit his hip on the chair.)  · Pain Rating Post-Intervention 1: 8/10      Objective:     Communicated with nurse prior to session.  Patient found supine with telemetry, peripheral IV upon PT entry to room.     General Precautions: Standard, airborne, fall   Orthopedic Precautions:N/A   Braces:       Functional Mobility:  · Bed Mobility:     · Rolling Right: independence  · Supine to Sit: independence  · Transfers:     · Sit to Stand:  independence with no AD  · Gait: pt received gait training ~ 52 ft Independently but c/o L hip pain and refused to gait train farther.       AM-PAC 6 CLICK MOBILITY  Turning over in bed (including adjusting bedclothes, sheets and blankets)?: 4  Sitting down on and standing up from a chair with arms  (e.g., wheelchair, bedside commode, etc.): 4  Moving from lying on back to sitting on the side of the bed?: 4  Moving to and from a bed to a chair (including a wheelchair)?: 4  Need to walk in hospital room?: 4  Climbing 3-5 steps with a railing?: 4  Basic Mobility Total Score: 24       Therapeutic Activities and Exercises:   pt received verbal instruction in PT discharge and to cont ambulating in room. Pt verbally expressed understanding of such.     Patient left sitting on EOB.  with all lines intact and call button in reach..    GOALS:   Multidisciplinary Problems     Physical Therapy Goals     Not on file          Multidisciplinary Problems (Resolved)        Problem: Physical Therapy Goal    Goal Priority Disciplines Outcome Goal Variances Interventions   Physical Therapy Goal   (Resolved)     PT, PT/OT Met     Description:  Goals to be met by: 2020     Patient will increase functional independence with mobility by performin. Sit to stand transfer with [place] -met   2. Gait  x 100 feet with Supervision without AD.  3. Lower extremity exercise program x15 reps, with supervision, in order to increase LE strength and (I) with functional mobility.                         Time Tracking:     PT Received On: 20  PT Start Time: 1400     PT Stop Time: 1408  PT Total Time (min): 8 min     Billable Minutes: Gait Training 8 min    Treatment Type: Treatment(Discharge)  PT/PTA: PT     PTA Visit Number: 0     Marilou Grissom, PT  2020

## 2020-04-13 NOTE — ASSESSMENT & PLAN NOTE
- HgbA1c 11%  - Insulin adjusted to 20U levemir QHS, 18U levemir AM, 8U aspart TID   - LDSII  - BG goal 140-180

## 2020-04-13 NOTE — PLAN OF CARE
AAOX4, NAD, VSS. No IV access; all IV ABX held. Awaiting ML placement. Personal items and call light in reach. Fall protocol maintained. POC Reviewed with pt.

## 2020-04-13 NOTE — SUBJECTIVE & OBJECTIVE
Interval History: Patient did not have IV access yesterday. Plan for midline today. Glucose was low overnight but improved. He states that his pain is under better control.      Review of Systems   Constitutional: Positive for fatigue. Negative for fever.   HENT: Negative for congestion and sore throat.    Eyes: Negative for pain and visual disturbance.   Respiratory: Negative for cough and shortness of breath.    Cardiovascular: Negative for chest pain and leg swelling.   Gastrointestinal: Negative for abdominal pain, diarrhea, nausea and vomiting.   Genitourinary: Negative for difficulty urinating and dysuria.   Musculoskeletal: Negative for back pain and neck pain.   Neurological: Negative for syncope and headaches.   Psychiatric/Behavioral: Negative for behavioral problems and confusion.     Objective:     Vital Signs (Most Recent):  Temp: 98.3 °F (36.8 °C) (04/13/20 0758)  Pulse: 98 (04/13/20 0758)  Resp: 18 (04/13/20 0758)  BP: 119/65 (04/13/20 0549)  SpO2: 98 % (04/13/20 0758) Vital Signs (24h Range):  Temp:  [96.2 °F (35.7 °C)-98.4 °F (36.9 °C)] 98.3 °F (36.8 °C)  Pulse:  [76-98] 98  Resp:  [18-20] 18  SpO2:  [96 %-99 %] 98 %  BP: (109-125)/(65-71) 119/65     Weight: 59.5 kg (131 lb 2.8 oz)  Body mass index is 19.94 kg/m².    Intake/Output Summary (Last 24 hours) at 4/13/2020 0942  Last data filed at 4/12/2020 2100  Gross per 24 hour   Intake 462 ml   Output 0 ml   Net 462 ml      Physical Exam   Constitutional: He is oriented to person, place, and time. He appears well-developed. No distress.   Thin   HENT:   Head: Normocephalic and atraumatic.   Eyes: Pupils are equal, round, and reactive to light. EOM are normal.   Neck: Normal range of motion. No tracheal deviation present. No thyromegaly present.   Cardiovascular: Normal rate and normal heart sounds.   No murmur heard.  Pulmonary/Chest: Effort normal and breath sounds normal. No respiratory distress. He has no rales.   Abdominal: Bowel sounds are  normal. He exhibits no distension.   Musculoskeletal: Normal range of motion. He exhibits no edema.   Neurological: He is alert and oriented to person, place, and time. No cranial nerve deficit.   Skin: He is not diaphoretic.   Nursing note and vitals reviewed.      Significant Labs:   CBC:   Recent Labs   Lab 04/13/20  0546   WBC 7.56   HGB 9.9*   HCT 33.2*   *     CMP:   Recent Labs   Lab 04/13/20  0546      K 4.5      CO2 25   GLU 98   BUN 25*   CREATININE 1.2   CALCIUM 9.3   PROT 8.6*   ALBUMIN 2.6*   BILITOT 0.2   ALKPHOS 76   AST 47*   ALT 52*   ANIONGAP 10   EGFRNONAA >60.0       Significant Imaging: I have reviewed all pertinent imaging results/findings within the past 24 hours.

## 2020-04-13 NOTE — CONSULTS
Placed 18g, 8 cm Midline in left brachial vein using u/s guidance.  Max dwell date 5/12/2020.  Lot # [id number].

## 2020-04-13 NOTE — PLAN OF CARE
04/13/20 1037   Discharge Reassessment   Assessment Type Discharge Planning Reassessment   Provided patient/caregiver education on the expected discharge date and the discharge plan Yes  (By care team)   Do you have any problems affording any of your prescribed medications? No   Discharge Plan A Long-term acute care facility (LTAC)   Discharge Plan B Long-term acute care facility (LTAC)   DME Needed Upon Discharge  medication pump   Anticipated Discharge Disposition LTAC     Julie Haase RN  Case Management [phone]

## 2020-04-13 NOTE — ASSESSMENT & PLAN NOTE
54M with IVDU with Hep C, hx of MRSA (V sens), Pancreatitis, DM2, who presents as a transfer from [facility] for multifocal apical cavitations and opacities, assoc. w/ air +fluid, suspected due to MRSA pneumonia c/b parapneumonic empyema and possible septic embolization. covid -, s/d 3/28   Remains stable on vanc and zosyn, cultures remain clear. Disposition- unable to place to  due to hx of IVDU. Patient does not desire outside facility transfer.     RESOLVED as patient is stable on RA.    Plan:  - Recent Blood cultures remain negative, WBC count normal.    - Continue TB rule out, pending AFB samples. No acid fast bacilli on gram stain.   - Follow up with ID: continue broad spectrum therapy, on vanc and zosyn. STOP ceftriaxone on 4/3, per ID, lactobacillus in pleural fluid can be resistant, also with E Coli/Klebsiella.  -plans to discharge with IV vanc + cefpedoxime or augmentin for 4-6 weeks total (end date 05/09/2020). Repeat imaging in 4 weeks and may require doxycyline following re imaging if worsening.   - If patient leaves AMA, can offer linezolid, but would be suboptimal treatment.   - CTS consulted, appreciate assistance: unlikely to perform intervention as he has had good output and decrease on O2 requirements. TTE negative.  - TPA per pulmonology, received 5/5 doses with good output in chest tube (placed 03/30) and improvement in respiratory status. CT pulled.

## 2020-04-14 LAB
POCT GLUCOSE: 100 MG/DL (ref 70–110)
POCT GLUCOSE: 103 MG/DL (ref 70–110)
POCT GLUCOSE: 67 MG/DL (ref 70–110)
POCT GLUCOSE: 77 MG/DL (ref 70–110)
POCT GLUCOSE: 92 MG/DL (ref 70–110)
VANCOMYCIN TROUGH SERPL-MCNC: 11.7 UG/ML (ref 10–22)

## 2020-04-14 PROCEDURE — 25000003 PHARM REV CODE 250: Performed by: PHYSICIAN ASSISTANT

## 2020-04-14 PROCEDURE — 63600175 PHARM REV CODE 636 W HCPCS

## 2020-04-14 PROCEDURE — 80202 ASSAY OF VANCOMYCIN: CPT

## 2020-04-14 PROCEDURE — 25000003 PHARM REV CODE 250: Performed by: INTERNAL MEDICINE

## 2020-04-14 PROCEDURE — 99233 SBSQ HOSP IP/OBS HIGH 50: CPT | Mod: GC,,,

## 2020-04-14 PROCEDURE — 20600001 HC STEP DOWN PRIVATE ROOM

## 2020-04-14 PROCEDURE — 99900035 HC TECH TIME PER 15 MIN (STAT)

## 2020-04-14 PROCEDURE — 25000003 PHARM REV CODE 250: Performed by: STUDENT IN AN ORGANIZED HEALTH CARE EDUCATION/TRAINING PROGRAM

## 2020-04-14 PROCEDURE — 99233 PR SUBSEQUENT HOSPITAL CARE,LEVL III: ICD-10-PCS | Mod: GC,,,

## 2020-04-14 PROCEDURE — 63600175 PHARM REV CODE 636 W HCPCS: Performed by: STUDENT IN AN ORGANIZED HEALTH CARE EDUCATION/TRAINING PROGRAM

## 2020-04-14 PROCEDURE — 94760 N-INVAS EAR/PLS OXIMETRY 1: CPT

## 2020-04-14 RX ADMIN — INSULIN ASPART 8 UNITS: 100 INJECTION, SOLUTION INTRAVENOUS; SUBCUTANEOUS at 12:04

## 2020-04-14 RX ADMIN — PIPERACILLIN AND TAZOBACTAM 4.5 G: 4; .5 INJECTION, POWDER, LYOPHILIZED, FOR SOLUTION INTRAVENOUS; PARENTERAL at 01:04

## 2020-04-14 RX ADMIN — HYDROXYZINE HYDROCHLORIDE 10 MG: 10 TABLET, FILM COATED ORAL at 08:04

## 2020-04-14 RX ADMIN — ATORVASTATIN CALCIUM 20 MG: 20 TABLET, FILM COATED ORAL at 08:04

## 2020-04-14 RX ADMIN — VANCOMYCIN HYDROCHLORIDE 1250 MG: 1.25 INJECTION, POWDER, LYOPHILIZED, FOR SOLUTION INTRAVENOUS at 09:04

## 2020-04-14 RX ADMIN — OXYCODONE AND ACETAMINOPHEN 1 TABLET: 10; 325 TABLET ORAL at 09:04

## 2020-04-14 RX ADMIN — INSULIN DETEMIR 18 UNITS: 100 INJECTION, SOLUTION SUBCUTANEOUS at 08:04

## 2020-04-14 RX ADMIN — THERA TABS 1 TABLET: TAB at 01:04

## 2020-04-14 RX ADMIN — Medication 1 TABLET: at 08:04

## 2020-04-14 RX ADMIN — PIPERACILLIN AND TAZOBACTAM 4.5 G: 4; .5 INJECTION, POWDER, LYOPHILIZED, FOR SOLUTION INTRAVENOUS; PARENTERAL at 10:04

## 2020-04-14 RX ADMIN — INSULIN ASPART 8 UNITS: 100 INJECTION, SOLUTION INTRAVENOUS; SUBCUTANEOUS at 08:04

## 2020-04-14 RX ADMIN — Medication 6 MG: at 09:04

## 2020-04-14 RX ADMIN — HYDROXYZINE HYDROCHLORIDE 10 MG: 10 TABLET, FILM COATED ORAL at 09:04

## 2020-04-14 RX ADMIN — OXYCODONE AND ACETAMINOPHEN 1 TABLET: 10; 325 TABLET ORAL at 03:04

## 2020-04-14 RX ADMIN — PIPERACILLIN AND TAZOBACTAM 4.5 G: 4; .5 INJECTION, POWDER, LYOPHILIZED, FOR SOLUTION INTRAVENOUS; PARENTERAL at 04:04

## 2020-04-14 RX ADMIN — LIDOCAINE 1 PATCH: 50 PATCH CUTANEOUS at 08:04

## 2020-04-14 RX ADMIN — HYDROXYZINE HYDROCHLORIDE 10 MG: 10 TABLET, FILM COATED ORAL at 03:04

## 2020-04-14 NOTE — ASSESSMENT & PLAN NOTE
54M with IVDU with Hep C, hx of MRSA (V sens), Pancreatitis, DM2, who presents as a transfer from [facility] for multifocal apical cavitations and opacities, assoc. w/ air +fluid, suspected due to MRSA pneumonia c/b parapneumonic empyema and possible septic embolization. covid -, s/d 3/28   Remains stable on vanc and zosyn, cultures remain clear. Disposition- unable to place to  due to hx of IVDU. Patient does not desire outside facility transfer.     RESOLVED as patient is stable on RA.    Plan:  - Recent Blood cultures remain negative, WBC count normal.    - Continue TB rule out, pending AFB samples. No acid fast bacilli on gram stain.   - Follow up with ID: continue broad spectrum therapy, on vanc and zosyn. STOP ceftriaxone on 4/3, per ID, lactobacillus in pleural fluid can be resistant, also with E Coli/Klebsiella.  -plans to discharge with IV vanc + cefpedoxime or augmentin for 4-6 weeks total (end date 05/09/2020). Repeat imaging in 4 weeks and may require doxycyline following re imaging if worsening.   - If patient leaves AMA, can offer linezolid, but would be suboptimal treatment.   - CTS consulted, appreciate assistance: unlikely to perform intervention as he has had good output and decrease on O2 requirements. TTE negative.  - TPA per pulmonology, received 5/5 doses with good output in chest tube (placed 03/30) and improvement in respiratory status. CT pulled.

## 2020-04-14 NOTE — PROGRESS NOTES
"[facility]  Progress Note    Patient Name: Cali Mabry  MRN: [id number]  Patient Class: IP- Inpatient   Admission Date: 3/26/2020  Length of Stay: 18 days  Attending Physician: Michoacano Holbrook MD  Primary Care Provider: Primary Doctor No        Subjective:     Principal Problem:Acute respiratory failure with hypoxia        HPI:  Cali Mabry is a 54 y.o. with DM II, hepatitis C, HTN, polysubstance abuse/IVDU, pancreatitis who presents, after transfer from [facility], with a 2 day history of chest pain and shortness of breath.  Chest pain is centralized and just described as "painful." Pain aggravated with movement and deep breathing. He reports an intermittent cough that is non-productive. He denies sick contacts, fever, chills, sore throat. Upon questioning, patient stated "I have the virus so just treat me for it." Educated patient that testing is still in process. Inquired about IV drug use however he was unable to recall when he last used. Patient swatted examiner's hand after chest palpation, yelling "that hurts!" Patient became aggravated, reported his pain was now worse and requested pain medication. Per nurse, patient given PRN dose an hour prior. Patient encouraged to continue discussing his plan of care however stated, "I don't want to chat."   ED: Afebrile with leukocytosis. CTA chest from 3/26 with dense consolidative changes within the right lower lobe with internal cavitation containing air and fluid; Occlusion is seen of the right lower lobe bronchi which may be secondary to mucoid impaction or aspirated material; Additional multifocal opacities and cavitations.  Vancomycin and Zosyn administered. On 2L NC. VSS. Admitted to hospital medicine for further evaluation and COVID-19 rule out.        Overview/Hospital Course:  55 yo male with IVDU with Hep C, hx of MRSA (V sens), Pancreatitis, DM2, who presents as a transfer from [facility] for SOB and CP, CT + multifocal " "apical cavitations and opacities, assoc. w/ air +fluid, suspected due to MRSA pneumonia c/b parapneumonic empyema and possible septic embolization. covid -, s/d 3/28. Chest tube placed 3/30 qhs on TPA x 6 doses (2/6 doses on 3/30 and 3/29). CTS no intervention for now. TTE negative. Chest tube was pulled. ID's final recommendations included: plans todischarge with IV vanc + cefpedoxime or augmentin for 4-6 weeks total (end date 05/09/2020). Repeat imaging in 4 weeks and may require doxycyline following re imaging if worsening. If patient leaves AMA, can offer linezolid, but that would be suboptimal treatment.     Throughout hospital stay, patient's insulin was titrated to maintain -180.    04/14/2020 NAEON. He reports feeling well. Has some mild left hip soreness that he describes similar to "[place] horse". Otherwise tolerating diet. Getting out of bed.     Interval History: 04/14/2020 NAEON. He reports feeling well. Has some mild left hip soreness that he describes similar to "cheyenne horse". Otherwise tolerating diet. Getting out of bed.     Review of Systems   Constitutional: Negative for chills, fatigue and fever.   Allergic/Immunologic: Negative for immunocompromised state.     Objective:     Vital Signs (Most Recent):  Temp: 97.8 °F (36.6 °C) (04/14/20 0801)  Pulse: 83 (04/14/20 0801)  Resp: 20 (04/14/20 0801)  BP: (!) 104/56 (04/14/20 0801)  SpO2: 97 % (04/14/20 0801) Vital Signs (24h Range):  Temp:  [96.9 °F (36.1 °C)-98.4 °F (36.9 °C)] 97.8 °F (36.6 °C)  Pulse:  [70-86] 83  Resp:  [16-20] 20  SpO2:  [97 %-98 %] 97 %  BP: (103-110)/(56-62) 104/56     Weight: 58.7 kg (129 lb 6.6 oz)  Body mass index is 19.68 kg/m².    Intake/Output Summary (Last 24 hours) at 4/14/2020 1220  Last data filed at 4/14/2020 0433  Gross per 24 hour   Intake 1240 ml   Output 850 ml   Net 390 ml      Physical Exam   Constitutional: He is oriented to person, place, and time. He appears well-developed. No distress.   Thin "   HENT:   Head: Normocephalic and atraumatic.   Eyes: Pupils are equal, round, and reactive to light. EOM are normal.   Neck: Normal range of motion. No tracheal deviation present. No thyromegaly present.   Cardiovascular: Normal rate and normal heart sounds.   No murmur heard.  Pulmonary/Chest: Effort normal and breath sounds normal. No respiratory distress. He has no rales.   Abdominal: Bowel sounds are normal. He exhibits no distension.   Musculoskeletal: Normal range of motion. He exhibits no edema.   Neurological: He is alert and oriented to person, place, and time. No cranial nerve deficit.   Skin: He is not diaphoretic.   Nursing note and vitals reviewed.      Significant Labs:   CBC:   Recent Labs   Lab 04/13/20  0546   WBC 7.56   HGB 9.9*   HCT 33.2*   *     CMP:   Recent Labs   Lab 04/13/20  0546      K 4.5      CO2 25   GLU 98   BUN 25*   CREATININE 1.2   CALCIUM 9.3   PROT 8.6*   ALBUMIN 2.6*   BILITOT 0.2   ALKPHOS 76   AST 47*   ALT 52*   ANIONGAP 10   EGFRNONAA >60.0       Significant Imaging: I have reviewed and interpreted all pertinent imaging results/findings within the past 24 hours.      Assessment/Plan:      * Acute respiratory failure with hypoxia  54M with IVDU with Hep C, hx of MRSA (V sens), Pancreatitis, DM2, who presents as a transfer from [facility] for multifocal apical cavitations and opacities, assoc. w/ air +fluid, suspected due to MRSA pneumonia c/b parapneumonic empyema and possible septic embolization. covid -, s/d 3/28   Remains stable on vanc and zosyn, cultures remain clear. Disposition- unable to place to  due to hx of IVDU. Patient does not desire outside facility transfer.     RESOLVED as patient is stable on RA.    Plan:  - Recent Blood cultures remain negative, WBC count normal.    - Continue TB rule out, pending AFB samples. No acid fast bacilli on gram stain.   - Follow up with ID: continue broad spectrum therapy, on vanc and zosyn. STOP ceftriaxone  on 4/3, per ID, lactobacillus in pleural fluid can be resistant, also with E Coli/Klebsiella.  -plans to discharge with IV vanc + cefpedoxime or augmentin for 4-6 weeks total (end date 05/09/2020). Repeat imaging in 4 weeks and may require doxycyline following re imaging if worsening.   - If patient leaves AMA, can offer linezolid, but would be suboptimal treatment.   - CTS consulted, appreciate assistance: unlikely to perform intervention as he has had good output and decrease on O2 requirements. TTE negative.  - TPA per pulmonology, received 5/5 doses with good output in chest tube (placed 03/30) and improvement in respiratory status. CT pulled.    Septic embolism  TTE negative  Neuro checks q4h.       Parapneumonic effusion  See above       Hyponatremia  Improved with blood sugar control and fluid hydration. Likely related to hyperglycemia as corrected Na within normal.   Component of decreased effective art volume    Continue to trend     Reactive thrombocytosis  Likely 2/2 infection  Or possible bleeding, please see anemia   Trend CBC    Iron deficiency anemia        Opioid abuse  Cautious pain control. Continue Percocet 10mg Q6H PRN.      Tobacco abuse        Chronic hepatitis C        Anemia of chronic disease        Intravenous drug abuse    -counseled on cessation      Hyperlipidemia  - continue statin       Normocytic normochromic anemia  Hb 6.5 on 4/4/2020 s/p transfusion x 1 prbc  Stable with appropriate response Hb.   No active bleeding  Type/cross  Iron studies consistent with Iron deficiency and ACD        Essential hypertension  Stable, not on meds       Type 2 diabetes mellitus, uncontrolled  - HgbA1c 11%  - Insulin adjusted to 20U levemir QHS, 18U levemir AM, 8U aspart TID   - LDSII  - BG goal 140-180    Opioid dependence, continuous          VTE Risk Mitigation (From admission, onward)         Ordered     enoxaparin injection 40 mg  Daily      04/04/20 0800     IP VTE HIGH RISK PATIENT  Once       03/27/20 1511                      Matthew Blackwell MD  Department of Hospital Medicine   [facility]

## 2020-04-14 NOTE — PLAN OF CARE
Pt AAOx4, VSS, afebrile. Pt with c/o pain to hips, and c/o anxiety throughout day, prn meds given with moderate relief. Heat packs and lidocaine patch also applied with minimal to moderate relief. Pt agitated and short tempered throughout day. BG monitoring, insulin given as ordered. Continues on zosyn and vanc. Pt up independently in room. Pt in chair, wheels locked, call light in reach, instructed to call staff for assistance.

## 2020-04-14 NOTE — SUBJECTIVE & OBJECTIVE
"Interval History: 04/14/2020 JOSE E. He reports feeling well. Has some mild left hip soreness that he describes similar to "[place] horse". Otherwise tolerating diet. Getting out of bed.     Review of Systems   Constitutional: Negative for chills, fatigue and fever.   Allergic/Immunologic: Negative for immunocompromised state.     Objective:     Vital Signs (Most Recent):  Temp: 97.8 °F (36.6 °C) (04/14/20 0801)  Pulse: 83 (04/14/20 0801)  Resp: 20 (04/14/20 0801)  BP: (!) 104/56 (04/14/20 0801)  SpO2: 97 % (04/14/20 0801) Vital Signs (24h Range):  Temp:  [96.9 °F (36.1 °C)-98.4 °F (36.9 °C)] 97.8 °F (36.6 °C)  Pulse:  [70-86] 83  Resp:  [16-20] 20  SpO2:  [97 %-98 %] 97 %  BP: (103-110)/(56-62) 104/56     Weight: 58.7 kg (129 lb 6.6 oz)  Body mass index is 19.68 kg/m².    Intake/Output Summary (Last 24 hours) at 4/14/2020 1220  Last data filed at 4/14/2020 0433  Gross per 24 hour   Intake 1240 ml   Output 850 ml   Net 390 ml      Physical Exam   Constitutional: He is oriented to person, place, and time. He appears well-developed. No distress.   Thin   HENT:   Head: Normocephalic and atraumatic.   Eyes: Pupils are equal, round, and reactive to light. EOM are normal.   Neck: Normal range of motion. No tracheal deviation present. No thyromegaly present.   Cardiovascular: Normal rate and normal heart sounds.   No murmur heard.  Pulmonary/Chest: Effort normal and breath sounds normal. No respiratory distress. He has no rales.   Abdominal: Bowel sounds are normal. He exhibits no distension.   Musculoskeletal: Normal range of motion. He exhibits no edema.   Neurological: He is alert and oriented to person, place, and time. No cranial nerve deficit.   Skin: He is not diaphoretic.   Nursing note and vitals reviewed.      Significant Labs:   CBC:   Recent Labs   Lab 04/13/20  0546   WBC 7.56   HGB 9.9*   HCT 33.2*   *     CMP:   Recent Labs   Lab 04/13/20  0546      K 4.5      CO2 25   GLU 98   BUN 25* "   CREATININE 1.2   CALCIUM 9.3   PROT 8.6*   ALBUMIN 2.6*   BILITOT 0.2   ALKPHOS 76   AST 47*   ALT 52*   ANIONGAP 10   EGFRNONAA >60.0       Significant Imaging: I have reviewed and interpreted all pertinent imaging results/findings within the past 24 hours.

## 2020-04-15 LAB
ALBUMIN SERPL BCP-MCNC: 2.6 G/DL (ref 3.5–5.2)
ALP SERPL-CCNC: 74 U/L (ref 55–135)
ALT SERPL W/O P-5'-P-CCNC: 67 U/L (ref 10–44)
ANION GAP SERPL CALC-SCNC: 9 MMOL/L (ref 8–16)
AST SERPL-CCNC: 52 U/L (ref 10–40)
BASOPHILS # BLD AUTO: 0.02 K/UL (ref 0–0.2)
BASOPHILS NFR BLD: 0.3 % (ref 0–1.9)
BILIRUB SERPL-MCNC: 0.3 MG/DL (ref 0.1–1)
BUN SERPL-MCNC: 34 MG/DL (ref 6–20)
CALCIUM SERPL-MCNC: 9.2 MG/DL (ref 8.7–10.5)
CHLORIDE SERPL-SCNC: 101 MMOL/L (ref 95–110)
CO2 SERPL-SCNC: 25 MMOL/L (ref 23–29)
CREAT SERPL-MCNC: 1.5 MG/DL (ref 0.5–1.4)
DIFFERENTIAL METHOD: ABNORMAL
EOSINOPHIL # BLD AUTO: 0 K/UL (ref 0–0.5)
EOSINOPHIL NFR BLD: 0.3 % (ref 0–8)
ERYTHROCYTE [DISTWIDTH] IN BLOOD BY AUTOMATED COUNT: 17.7 % (ref 11.5–14.5)
EST. GFR  (AFRICAN AMERICAN): >60 ML/MIN/1.73 M^2
EST. GFR  (NON AFRICAN AMERICAN): 52.4 ML/MIN/1.73 M^2
GLUCOSE SERPL-MCNC: 386 MG/DL (ref 70–110)
HCT VFR BLD AUTO: 30.9 % (ref 40–54)
HGB BLD-MCNC: 9.2 G/DL (ref 14–18)
IMM GRANULOCYTES # BLD AUTO: 0.06 K/UL (ref 0–0.04)
IMM GRANULOCYTES NFR BLD AUTO: 1 % (ref 0–0.5)
LYMPHOCYTES # BLD AUTO: 1.7 K/UL (ref 1–4.8)
LYMPHOCYTES NFR BLD: 27.8 % (ref 18–48)
MCH RBC QN AUTO: 25.8 PG (ref 27–31)
MCHC RBC AUTO-ENTMCNC: 29.8 G/DL (ref 32–36)
MCV RBC AUTO: 87 FL (ref 82–98)
MONOCYTES # BLD AUTO: 0.6 K/UL (ref 0.3–1)
MONOCYTES NFR BLD: 9.4 % (ref 4–15)
NEUTROPHILS # BLD AUTO: 3.8 K/UL (ref 1.8–7.7)
NEUTROPHILS NFR BLD: 61.2 % (ref 38–73)
NRBC BLD-RTO: 0 /100 WBC
PLATELET # BLD AUTO: 364 K/UL (ref 150–350)
PMV BLD AUTO: 9.8 FL (ref 9.2–12.9)
POCT GLUCOSE: 123 MG/DL (ref 70–110)
POCT GLUCOSE: 232 MG/DL (ref 70–110)
POCT GLUCOSE: 321 MG/DL (ref 70–110)
POCT GLUCOSE: 406 MG/DL (ref 70–110)
POTASSIUM SERPL-SCNC: 5.3 MMOL/L (ref 3.5–5.1)
PROT SERPL-MCNC: 8.2 G/DL (ref 6–8.4)
RBC # BLD AUTO: 3.57 M/UL (ref 4.6–6.2)
SODIUM SERPL-SCNC: 135 MMOL/L (ref 136–145)
VANCOMYCIN TROUGH SERPL-MCNC: 13 UG/ML (ref 10–22)
WBC # BLD AUTO: 6.25 K/UL (ref 3.9–12.7)

## 2020-04-15 PROCEDURE — 85025 COMPLETE CBC W/AUTO DIFF WBC: CPT

## 2020-04-15 PROCEDURE — 80053 COMPREHEN METABOLIC PANEL: CPT

## 2020-04-15 PROCEDURE — 80202 ASSAY OF VANCOMYCIN: CPT

## 2020-04-15 PROCEDURE — 25000003 PHARM REV CODE 250: Performed by: INTERNAL MEDICINE

## 2020-04-15 PROCEDURE — 99233 SBSQ HOSP IP/OBS HIGH 50: CPT | Mod: GC,,,

## 2020-04-15 PROCEDURE — 63600175 PHARM REV CODE 636 W HCPCS: Performed by: STUDENT IN AN ORGANIZED HEALTH CARE EDUCATION/TRAINING PROGRAM

## 2020-04-15 PROCEDURE — 99900035 HC TECH TIME PER 15 MIN (STAT)

## 2020-04-15 PROCEDURE — 25000003 PHARM REV CODE 250: Performed by: PHYSICIAN ASSISTANT

## 2020-04-15 PROCEDURE — 25000003 PHARM REV CODE 250: Performed by: STUDENT IN AN ORGANIZED HEALTH CARE EDUCATION/TRAINING PROGRAM

## 2020-04-15 PROCEDURE — 63600175 PHARM REV CODE 636 W HCPCS

## 2020-04-15 PROCEDURE — 36415 COLL VENOUS BLD VENIPUNCTURE: CPT

## 2020-04-15 PROCEDURE — 94760 N-INVAS EAR/PLS OXIMETRY 1: CPT

## 2020-04-15 PROCEDURE — 99233 PR SUBSEQUENT HOSPITAL CARE,LEVL III: ICD-10-PCS | Mod: GC,,,

## 2020-04-15 PROCEDURE — 20600001 HC STEP DOWN PRIVATE ROOM

## 2020-04-15 RX ADMIN — Medication 6 MG: at 07:04

## 2020-04-15 RX ADMIN — INSULIN ASPART 8 UNITS: 100 INJECTION, SOLUTION INTRAVENOUS; SUBCUTANEOUS at 08:04

## 2020-04-15 RX ADMIN — INSULIN ASPART 4 UNITS: 100 INJECTION, SOLUTION INTRAVENOUS; SUBCUTANEOUS at 12:04

## 2020-04-15 RX ADMIN — SENNOSIDES AND DOCUSATE SODIUM 1 TABLET: 8.6; 5 TABLET ORAL at 01:04

## 2020-04-15 RX ADMIN — OXYCODONE AND ACETAMINOPHEN 1 TABLET: 10; 325 TABLET ORAL at 05:04

## 2020-04-15 RX ADMIN — HYDROXYZINE HYDROCHLORIDE 10 MG: 10 TABLET, FILM COATED ORAL at 07:04

## 2020-04-15 RX ADMIN — INSULIN ASPART 5 UNITS: 100 INJECTION, SOLUTION INTRAVENOUS; SUBCUTANEOUS at 08:04

## 2020-04-15 RX ADMIN — OXYCODONE AND ACETAMINOPHEN 1 TABLET: 10; 325 TABLET ORAL at 04:04

## 2020-04-15 RX ADMIN — INSULIN DETEMIR 18 UNITS: 100 INJECTION, SOLUTION SUBCUTANEOUS at 08:04

## 2020-04-15 RX ADMIN — HYDROXYZINE HYDROCHLORIDE 10 MG: 10 TABLET, FILM COATED ORAL at 08:04

## 2020-04-15 RX ADMIN — INSULIN ASPART 8 UNITS: 100 INJECTION, SOLUTION INTRAVENOUS; SUBCUTANEOUS at 05:04

## 2020-04-15 RX ADMIN — PIPERACILLIN AND TAZOBACTAM 4.5 G: 4; .5 INJECTION, POWDER, LYOPHILIZED, FOR SOLUTION INTRAVENOUS; PARENTERAL at 09:04

## 2020-04-15 RX ADMIN — PIPERACILLIN AND TAZOBACTAM 4.5 G: 4; .5 INJECTION, POWDER, LYOPHILIZED, FOR SOLUTION INTRAVENOUS; PARENTERAL at 04:04

## 2020-04-15 RX ADMIN — SODIUM CHLORIDE 1000 ML: 0.9 INJECTION, SOLUTION INTRAVENOUS at 10:04

## 2020-04-15 RX ADMIN — VANCOMYCIN HYDROCHLORIDE 1250 MG: 1.25 INJECTION, POWDER, LYOPHILIZED, FOR SOLUTION INTRAVENOUS at 07:04

## 2020-04-15 RX ADMIN — INSULIN ASPART 8 UNITS: 100 INJECTION, SOLUTION INTRAVENOUS; SUBCUTANEOUS at 12:04

## 2020-04-15 RX ADMIN — Medication 1 TABLET: at 08:04

## 2020-04-15 RX ADMIN — ATORVASTATIN CALCIUM 20 MG: 20 TABLET, FILM COATED ORAL at 08:04

## 2020-04-15 RX ADMIN — INSULIN ASPART 2 UNITS: 100 INJECTION, SOLUTION INTRAVENOUS; SUBCUTANEOUS at 05:04

## 2020-04-15 RX ADMIN — HYDROXYZINE HYDROCHLORIDE 10 MG: 10 TABLET, FILM COATED ORAL at 01:04

## 2020-04-15 RX ADMIN — THERA TABS 1 TABLET: TAB at 08:04

## 2020-04-15 RX ADMIN — PIPERACILLIN AND TAZOBACTAM 4.5 G: 4; .5 INJECTION, POWDER, LYOPHILIZED, FOR SOLUTION INTRAVENOUS; PARENTERAL at 01:04

## 2020-04-15 NOTE — SUBJECTIVE & OBJECTIVE
Interval History: 04/15/2020 NAEON. He is eating drinking well. Ambulating.     Review of Systems   Constitutional: Negative for chills, fatigue and fever.   Allergic/Immunologic: Negative for immunocompromised state.     Objective:     Vital Signs (Most Recent):  Temp: 98 °F (36.7 °C) (04/15/20 1130)  Pulse: 82 (04/15/20 1154)  Resp: 17 (04/15/20 1154)  BP: 118/66 (04/15/20 1130)  SpO2: 98 % (04/15/20 1154) Vital Signs (24h Range):  Temp:  [98 °F (36.7 °C)-98.3 °F (36.8 °C)] 98 °F (36.7 °C)  Pulse:  [76-84] 82  Resp:  [16-20] 17  SpO2:  [96 %-99 %] 98 %  BP: ()/(59-69) 118/66     Weight: 58.7 kg (129 lb 6.6 oz)  Body mass index is 19.68 kg/m².    Intake/Output Summary (Last 24 hours) at 4/15/2020 1417  Last data filed at 4/15/2020 1323  Gross per 24 hour   Intake 2090 ml   Output --   Net 2090 ml      Physical Exam   Constitutional: He is oriented to person, place, and time. He appears well-developed. No distress.   Thin   HENT:   Head: Normocephalic and atraumatic.   Eyes: Pupils are equal, round, and reactive to light. EOM are normal.   Neck: Normal range of motion. No tracheal deviation present. No thyromegaly present.   Cardiovascular: Normal rate and normal heart sounds.   No murmur heard.  Pulmonary/Chest: Effort normal and breath sounds normal. No respiratory distress. He has no rales.   Abdominal: Bowel sounds are normal. He exhibits no distension.   Musculoskeletal: Normal range of motion. He exhibits no edema.   Neurological: He is alert and oriented to person, place, and time. No cranial nerve deficit.   Skin: He is not diaphoretic.   Nursing note and vitals reviewed.      Significant Labs:   CBC:   Recent Labs   Lab 04/15/20  0543   WBC 6.25   HGB 9.2*   HCT 30.9*   *     CMP:   Recent Labs   Lab 04/15/20  0543   *   K 5.3*      CO2 25   *   BUN 34*   CREATININE 1.5*   CALCIUM 9.2   PROT 8.2   ALBUMIN 2.6*   BILITOT 0.3   ALKPHOS 74   AST 52*   ALT 67*   ANIONGAP 9    EGFRNONAA 52.4*       Significant Imaging: I have reviewed and interpreted all pertinent imaging results/findings within the past 24 hours.

## 2020-04-15 NOTE — PLAN OF CARE
"Pt AAOx4, bed low locked, call light within reach, wearing nonskid socks. Pt instructed to use call light for assistance, pt verbalizes understanding. Pt c/o pain, PRN pain meds x 1. AC/HS, 67, refused glucose gel, drank 2 apple juices, repeat blood glucose, 103. Pt agitated through out shift, refusing most care. Pt states, I don't know why the fuck I'm here." Pt cussing at staff repeatedly, swatted RN's hand away while trying to get vitals. Pt remains free from injury/harm at this time. Afebrile. See flowsheet for full assessment/VS.  "

## 2020-04-15 NOTE — ASSESSMENT & PLAN NOTE
54M with IVDU with Hep C, hx of MRSA (V sens), Pancreatitis, DM2, who presents as a transfer from [facility] for multifocal apical cavitations and opacities, assoc. w/ air +fluid, suspected due to MRSA pneumonia c/b parapneumonic empyema and possible septic embolization. covid -, s/d 3/28   Remains stable on vanc and zosyn, cultures remain clear. Disposition- unable to place to  due to hx of IVDU. Patient does not desire outside facility transfer.     RESOLVED as patient is stable on RA.    Plan:  - Recent Blood cultures remain negative, WBC count normal.    - Continue TB rule out, pending AFB samples. No acid fast bacilli on gram stain.   - Follow up with ID: continue broad spectrum therapy, on vanc and zosyn. STOP ceftriaxone on 4/3, per ID, lactobacillus in pleural fluid can be resistant, also with E Coli/Klebsiella.  -plans to discharge with IV vanc + cefpedoxime or augmentin for 4-6 weeks total (end date 05/09/2020). Repeat imaging in 4 weeks and may require doxycyline following re imaging if worsening.   - If patient leaves AMA, can offer linezolid, but would be suboptimal treatment.   - CTS consulted, appreciate assistance: unlikely to perform intervention as he has had good output and decrease on O2 requirements. TTE negative.  - TPA per pulmonology, received 5/5 doses with good output in chest tube (placed 03/30) and improvement in respiratory status. CT pulled.

## 2020-04-15 NOTE — PLAN OF CARE
"Patient AAOx4, vitals WDL, afebrile.   SANTINO midline in place, dressing CDI, zosyn infusing.  Ambulated in hallway with assistance but complained of right hip pain that "burned".   Patient more compliant today than previous shifts and seemed in better spirits.   Patient remains free from injury for shift, ambulates in room and has personal items and call light in reach.  "

## 2020-04-15 NOTE — PROGRESS NOTES
Pharmacokinetic Assessment Follow Up: IV Vancomycin    Vancomycin serum concentration assessment/plan:  · Patient missed Vancomyin dose on 4/12 due to loss of IV access  · Trough =11.7 mcg/mL drawn before the 2nd dose; goal 15-20, septic emboli  · SCr increased from 1.2 --> 1.5  · Due to the increase in creatinine and trough level drawn before 2nd dose, will continue with current regimen Vancomycin 1250 mg IV q24h for now  · Repeat trough before the next dose, 4/15@1930. Will adjust Vanc based on result    Drug levels (last 3 results):  Recent Labs   Lab Result Units 04/14/20  1944   Vancomycin-Trough ug/mL 11.7     Pharmacy will continue to follow and monitor vancomycin.    Please contact pharmacy at extension [phone] for questions regarding this assessment.    Thank you for the consult,   Evie Durham     Patient brief summary:  Cali Mabry is a 54 y.o. male initiated on antimicrobial therapy with IV Vancomycin for treatment of septic embolism    Drug Allergies:   Review of patient's allergies indicates:  No Known Allergies    Actual Body Weight:   58.7 kg    Renal Function:   Estimated Creatinine Clearance: 47.3 mL/min (A) (based on SCr of 1.5 mg/dL (H)).,     Dialysis Method (if applicable):  N/A    CBC (last 72 hours):  Recent Labs   Lab Result Units 04/13/20  0546 04/15/20  0543   WBC K/uL 7.56 6.25   Hemoglobin g/dL 9.9* 9.2*   Hematocrit % 33.2* 30.9*   Platelets K/uL 453* 364*   Gran% % 58.9 61.2   Lymph% % 28.4 27.8   Mono% % 10.2 9.4   Eosinophil% % 0.8 0.3   Basophil% % 0.4 0.3   Differential Method  Automated Automated       Metabolic Panel (last 72 hours):  Recent Labs   Lab Result Units 04/13/20  0546 04/15/20  0543   Sodium mmol/L 136 135*   Potassium mmol/L 4.5 5.3*   Chloride mmol/L 101 101   CO2 mmol/L 25 25   Glucose mg/dL 98 386*   BUN, Bld mg/dL 25* 34*   Creatinine mg/dL 1.2 1.5*   Albumin g/dL 2.6* 2.6*   Total Bilirubin mg/dL 0.2 0.3   Alkaline Phosphatase U/L 76 74   AST U/L 47*  52*   ALT U/L 52* 67*       Vancomycin Administrations:  vancomycin given in the last 96 hours                   vancomycin 1.25 g in dextrose 5% 250 mL IVPB (ready to mix) (mg) 1,250 mg New Bag 04/14/20 2107     1,250 mg New Bag 04/13/20 2129                Microbiologic Results:  Microbiology Results (last 7 days)     Procedure Component Value Units Date/Time    Fungus culture [[id number]] Collected:  03/29/20 2137    Order Status:  Completed Specimen:  Body Fluid from Pleural Fluid Updated:  04/14/20 1126     Fungus (Mycology) Culture Culture in progress      No fungus isolated after 2 weeks    Susceptibility, Aerobic Bacteria  RYAN [[id number]]  (Abnormal) Collected:  04/09/20 1206    Order Status:  Completed Updated:  04/12/20 1039     Specimen Source and Organism Name pleural fluid and Lactobacillus rahamnos     Susceptibility, Aerobic Bacteria FINAL 04/12/2020 1036     Comment: SOURCE: PLEURAL FLUID, pleural fluid and Lactobacillus   rahamnos  SUSCEPTIBILITY, AEROBIC, RYAN                           FINAL  LACTOBACILLUS RHAMNOSUS    Organism identified by client.  ----------------------------------------------------------  Organism     LACTOBACILLUS RHAMNOSUS  Antibiotic  RYAN (mcg/mL)  Interpretation  ----------------------------------------------------------  Penicillin            1       S  Meropenem             8       R  ------------------------------------------------------------  S=SUSCEPTIBLE  I=INTERMEDIATE  R=RESISTANT  NS=NONSUSCEPTIBLE  SDD=SUSCEPTIBLE DOSE DEPENDENT  ------------------------------------------------------------  Test Performed by:  [address] [address]  : Doroteo Lynn M.D. Ph.D.; CLIA# [id number]

## 2020-04-15 NOTE — CARE UPDATE
04/15/20 0824   Patient Assessment/Suction   Level of Consciousness (AVPU) alert   Respiratory Effort Normal;Unlabored   Expansion/Accessory Muscles/Retractions no retractions   All Lung Fields Breath Sounds clear   PRE-TX-O2   O2 Device (Oxygen Therapy) room air   SpO2 96 %   Pulse Oximetry Type Intermittent   $ Pulse Oximetry - Single Charge Pulse Oximetry - Single   Probe Placed On (Pulse Ox) Left:;finger   Oximetry Probe Site Applied   Pulse 84   Resp 17   Respiratory Evaluation   $ Care Plan Tech Time 15 min

## 2020-04-15 NOTE — PROGRESS NOTES
"[facility] Medicine  Progress Note    Patient Name: Cali Mabry  MRN: [id number]  Patient Class: IP- Inpatient   Admission Date: 3/26/2020  Length of Stay: 19 days  Attending Physician: Michoacano Holbrook MD  Primary Care Provider: Primary Doctor No        Subjective:     Principal Problem:Acute respiratory failure with hypoxia        HPI:  Cali Mabry is a 54 y.o. with DM II, hepatitis C, HTN, polysubstance abuse/IVDU, pancreatitis who presents, after transfer from [facility], with a 2 day history of chest pain and shortness of breath.  Chest pain is centralized and just described as "painful." Pain aggravated with movement and deep breathing. He reports an intermittent cough that is non-productive. He denies sick contacts, fever, chills, sore throat. Upon questioning, patient stated "I have the virus so just treat me for it." Educated patient that testing is still in process. Inquired about IV drug use however he was unable to recall when he last used. Patient swatted examiner's hand after chest palpation, yelling "that hurts!" Patient became aggravated, reported his pain was now worse and requested pain medication. Per nurse, patient given PRN dose an hour prior. Patient encouraged to continue discussing his plan of care however stated, "I don't want to chat."   ED: Afebrile with leukocytosis. CTA chest from 3/26 with dense consolidative changes within the right lower lobe with internal cavitation containing air and fluid; Occlusion is seen of the right lower lobe bronchi which may be secondary to mucoid impaction or aspirated material; Additional multifocal opacities and cavitations.  Vancomycin and Zosyn administered. On 2L NC. VSS. Admitted to hospital medicine for further evaluation and COVID-19 rule out.        Overview/Hospital Course:  53 yo male with IVDU with Hep C, hx of MRSA (V sens), Pancreatitis, DM2, who presents as a transfer from [facility] for SOB and CP, CT + multifocal " "apical cavitations and opacities, assoc. w/ air +fluid, suspected due to MRSA pneumonia c/b parapneumonic empyema and possible septic embolization. covid -, s/d 3/28. Chest tube placed 3/30 qhs on TPA x 6 doses (2/6 doses on 3/30 and 3/29). CTS no intervention for now. TTE negative. Chest tube was pulled. ID's final recommendations included: plans todischarge with IV vanc + cefpedoxime or augmentin for 4-6 weeks total (end date 05/09/2020). Repeat imaging in 4 weeks and may require doxycyline following re imaging if worsening. If patient leaves AMA, can offer linezolid, but that would be suboptimal treatment.     Throughout hospital stay, patient's insulin was titrated to maintain -180.    04/14/2020 NAEON. He reports feeling well. Has some mild left hip soreness that he describes similar to "cheyenne horse". Otherwise tolerating diet. Getting out of bed.     04/15/2020 NAEON. He is eating drinking well. Ambulating.     Interval History: 04/15/2020 NAEON. He is eating drinking well. Ambulating.     Review of Systems   Constitutional: Negative for chills, fatigue and fever.   Allergic/Immunologic: Negative for immunocompromised state.     Objective:     Vital Signs (Most Recent):  Temp: 98 °F (36.7 °C) (04/15/20 1130)  Pulse: 82 (04/15/20 1154)  Resp: 17 (04/15/20 1154)  BP: 118/66 (04/15/20 1130)  SpO2: 98 % (04/15/20 1154) Vital Signs (24h Range):  Temp:  [98 °F (36.7 °C)-98.3 °F (36.8 °C)] 98 °F (36.7 °C)  Pulse:  [76-84] 82  Resp:  [16-20] 17  SpO2:  [96 %-99 %] 98 %  BP: ()/(59-69) 118/66     Weight: 58.7 kg (129 lb 6.6 oz)  Body mass index is 19.68 kg/m².    Intake/Output Summary (Last 24 hours) at 4/15/2020 1417  Last data filed at 4/15/2020 1323  Gross per 24 hour   Intake 2090 ml   Output --   Net 2090 ml      Physical Exam   Constitutional: He is oriented to person, place, and time. He appears well-developed. No distress.   Thin   HENT:   Head: Normocephalic and atraumatic.   Eyes: Pupils are " equal, round, and reactive to light. EOM are normal.   Neck: Normal range of motion. No tracheal deviation present. No thyromegaly present.   Cardiovascular: Normal rate and normal heart sounds.   No murmur heard.  Pulmonary/Chest: Effort normal and breath sounds normal. No respiratory distress. He has no rales.   Abdominal: Bowel sounds are normal. He exhibits no distension.   Musculoskeletal: Normal range of motion. He exhibits no edema.   Neurological: He is alert and oriented to person, place, and time. No cranial nerve deficit.   Skin: He is not diaphoretic.   Nursing note and vitals reviewed.      Significant Labs:   CBC:   Recent Labs   Lab 04/15/20  0543   WBC 6.25   HGB 9.2*   HCT 30.9*   *     CMP:   Recent Labs   Lab 04/15/20  0543   *   K 5.3*      CO2 25   *   BUN 34*   CREATININE 1.5*   CALCIUM 9.2   PROT 8.2   ALBUMIN 2.6*   BILITOT 0.3   ALKPHOS 74   AST 52*   ALT 67*   ANIONGAP 9   EGFRNONAA 52.4*       Significant Imaging: I have reviewed and interpreted all pertinent imaging results/findings within the past 24 hours.      Assessment/Plan:      * Acute respiratory failure with hypoxia  54M with IVDU with Hep C, hx of MRSA (V sens), Pancreatitis, DM2, who presents as a transfer from [facility] for multifocal apical cavitations and opacities, assoc. w/ air +fluid, suspected due to MRSA pneumonia c/b parapneumonic empyema and possible septic embolization. covid -, s/d 3/28   Remains stable on vanc and zosyn, cultures remain clear. Disposition- unable to place to  due to hx of IVDU. Patient does not desire outside facility transfer.     RESOLVED as patient is stable on RA.    Plan:  - Recent Blood cultures remain negative, WBC count normal.    - Continue TB rule out, pending AFB samples. No acid fast bacilli on gram stain.   - Follow up with ID: continue broad spectrum therapy, on vanc and zosyn. STOP ceftriaxone on 4/3, per ID, lactobacillus in pleural fluid can be  resistant, also with E Coli/Klebsiella.  -plans to discharge with IV vanc + cefpedoxime or augmentin for 4-6 weeks total (end date 05/09/2020). Repeat imaging in 4 weeks and may require doxycyline following re imaging if worsening.   - If patient leaves AMA, can offer linezolid, but would be suboptimal treatment.   - CTS consulted, appreciate assistance: unlikely to perform intervention as he has had good output and decrease on O2 requirements. TTE negative.  - TPA per pulmonology, received 5/5 doses with good output in chest tube (placed 03/30) and improvement in respiratory status. CT pulled.    Septic embolism  TTE negative  Neuro checks q4h.       Parapneumonic effusion  See above       Hyponatremia  Improved with blood sugar control and fluid hydration. Likely related to hyperglycemia as corrected Na within normal.   Component of decreased effective art volume    Continue to trend     Reactive thrombocytosis  Likely 2/2 infection  Or possible bleeding, please see anemia   Trend CBC    Iron deficiency anemia        Opioid abuse  Cautious pain control. Continue Percocet 10mg Q6H PRN.      Tobacco abuse        Chronic hepatitis C        Anemia of chronic disease        Intravenous drug abuse    -counseled on cessation      Hyperlipidemia  - continue statin       Normocytic normochromic anemia  Hb 6.5 on 4/4/2020 s/p transfusion x 1 prbc  Stable with appropriate response Hb.   No active bleeding  Type/cross  Iron studies consistent with Iron deficiency and ACD        Essential hypertension  Stable, not on meds       Type 2 diabetes mellitus, uncontrolled  - HgbA1c 11%  - Insulin adjusted to 20U levemir QHS, 18U levemir AM, 8U aspart TID   - LDSII  - BG goal 140-180    Opioid dependence, continuous          VTE Risk Mitigation (From admission, onward)         Ordered     enoxaparin injection 40 mg  Daily      04/04/20 0800     IP VTE HIGH RISK PATIENT  Once      03/27/20 1511                      Matthew Blackwell,  MD  Department of Hospital Medicine   [facility]

## 2020-04-16 LAB
ANION GAP SERPL CALC-SCNC: 9 MMOL/L (ref 8–16)
BUN SERPL-MCNC: 29 MG/DL (ref 6–20)
CALCIUM SERPL-MCNC: 8.8 MG/DL (ref 8.7–10.5)
CHLORIDE SERPL-SCNC: 104 MMOL/L (ref 95–110)
CO2 SERPL-SCNC: 23 MMOL/L (ref 23–29)
CREAT SERPL-MCNC: 1.3 MG/DL (ref 0.5–1.4)
EST. GFR  (AFRICAN AMERICAN): >60 ML/MIN/1.73 M^2
EST. GFR  (NON AFRICAN AMERICAN): >60 ML/MIN/1.73 M^2
GLUCOSE SERPL-MCNC: 240 MG/DL (ref 70–110)
POCT GLUCOSE: 155 MG/DL (ref 70–110)
POCT GLUCOSE: 226 MG/DL (ref 70–110)
POCT GLUCOSE: 274 MG/DL (ref 70–110)
POTASSIUM SERPL-SCNC: 5 MMOL/L (ref 3.5–5.1)
SODIUM SERPL-SCNC: 136 MMOL/L (ref 136–145)

## 2020-04-16 PROCEDURE — 36415 COLL VENOUS BLD VENIPUNCTURE: CPT

## 2020-04-16 PROCEDURE — 63600175 PHARM REV CODE 636 W HCPCS: Performed by: STUDENT IN AN ORGANIZED HEALTH CARE EDUCATION/TRAINING PROGRAM

## 2020-04-16 PROCEDURE — 25000003 PHARM REV CODE 250: Performed by: PHYSICIAN ASSISTANT

## 2020-04-16 PROCEDURE — 20600001 HC STEP DOWN PRIVATE ROOM

## 2020-04-16 PROCEDURE — 99233 SBSQ HOSP IP/OBS HIGH 50: CPT | Mod: GC,,, | Performed by: STUDENT IN AN ORGANIZED HEALTH CARE EDUCATION/TRAINING PROGRAM

## 2020-04-16 PROCEDURE — 25000003 PHARM REV CODE 250: Performed by: STUDENT IN AN ORGANIZED HEALTH CARE EDUCATION/TRAINING PROGRAM

## 2020-04-16 PROCEDURE — 99900035 HC TECH TIME PER 15 MIN (STAT)

## 2020-04-16 PROCEDURE — 99233 PR SUBSEQUENT HOSPITAL CARE,LEVL III: ICD-10-PCS | Mod: GC,,, | Performed by: STUDENT IN AN ORGANIZED HEALTH CARE EDUCATION/TRAINING PROGRAM

## 2020-04-16 PROCEDURE — 25000003 PHARM REV CODE 250: Performed by: INTERNAL MEDICINE

## 2020-04-16 PROCEDURE — 80048 BASIC METABOLIC PNL TOTAL CA: CPT

## 2020-04-16 PROCEDURE — 94760 N-INVAS EAR/PLS OXIMETRY 1: CPT

## 2020-04-16 RX ORDER — HYDROXYZINE HYDROCHLORIDE 25 MG/1
25 TABLET, FILM COATED ORAL 3 TIMES DAILY PRN
Status: DISCONTINUED | OUTPATIENT
Start: 2020-04-16 | End: 2020-04-22 | Stop reason: HOSPADM

## 2020-04-16 RX ADMIN — INSULIN DETEMIR 18 UNITS: 100 INJECTION, SOLUTION SUBCUTANEOUS at 08:04

## 2020-04-16 RX ADMIN — INSULIN ASPART 8 UNITS: 100 INJECTION, SOLUTION INTRAVENOUS; SUBCUTANEOUS at 08:04

## 2020-04-16 RX ADMIN — INSULIN ASPART 2 UNITS: 100 INJECTION, SOLUTION INTRAVENOUS; SUBCUTANEOUS at 05:04

## 2020-04-16 RX ADMIN — HYDROXYZINE HYDROCHLORIDE 25 MG: 25 TABLET, FILM COATED ORAL at 12:04

## 2020-04-16 RX ADMIN — Medication 1 TABLET: at 08:04

## 2020-04-16 RX ADMIN — VANCOMYCIN HYDROCHLORIDE 1500 MG: 1.5 INJECTION, POWDER, LYOPHILIZED, FOR SOLUTION INTRAVENOUS at 07:04

## 2020-04-16 RX ADMIN — PIPERACILLIN AND TAZOBACTAM 4.5 G: 4; .5 INJECTION, POWDER, LYOPHILIZED, FOR SOLUTION INTRAVENOUS; PARENTERAL at 04:04

## 2020-04-16 RX ADMIN — PIPERACILLIN AND TAZOBACTAM 4.5 G: 4; .5 INJECTION, POWDER, LYOPHILIZED, FOR SOLUTION INTRAVENOUS; PARENTERAL at 01:04

## 2020-04-16 RX ADMIN — ATORVASTATIN CALCIUM 20 MG: 20 TABLET, FILM COATED ORAL at 08:04

## 2020-04-16 RX ADMIN — HYDROXYZINE HYDROCHLORIDE 10 MG: 10 TABLET, FILM COATED ORAL at 07:04

## 2020-04-16 RX ADMIN — OXYCODONE AND ACETAMINOPHEN 1 TABLET: 10; 325 TABLET ORAL at 01:04

## 2020-04-16 RX ADMIN — OXYCODONE AND ACETAMINOPHEN 1 TABLET: 10; 325 TABLET ORAL at 07:04

## 2020-04-16 RX ADMIN — HYDROXYZINE HYDROCHLORIDE 25 MG: 25 TABLET, FILM COATED ORAL at 06:04

## 2020-04-16 RX ADMIN — INSULIN ASPART 1 UNITS: 100 INJECTION, SOLUTION INTRAVENOUS; SUBCUTANEOUS at 09:04

## 2020-04-16 RX ADMIN — ACETAMINOPHEN 650 MG: 325 TABLET ORAL at 03:04

## 2020-04-16 RX ADMIN — THERA TABS 1 TABLET: TAB at 08:04

## 2020-04-16 RX ADMIN — INSULIN ASPART 3 UNITS: 100 INJECTION, SOLUTION INTRAVENOUS; SUBCUTANEOUS at 12:04

## 2020-04-16 RX ADMIN — PIPERACILLIN AND TAZOBACTAM 4.5 G: 4; .5 INJECTION, POWDER, LYOPHILIZED, FOR SOLUTION INTRAVENOUS; PARENTERAL at 09:04

## 2020-04-16 RX ADMIN — Medication 6 MG: at 07:04

## 2020-04-16 RX ADMIN — INSULIN ASPART 8 UNITS: 100 INJECTION, SOLUTION INTRAVENOUS; SUBCUTANEOUS at 05:04

## 2020-04-16 RX ADMIN — INSULIN ASPART 8 UNITS: 100 INJECTION, SOLUTION INTRAVENOUS; SUBCUTANEOUS at 12:04

## 2020-04-16 RX ADMIN — LIDOCAINE 1 PATCH: 50 PATCH CUTANEOUS at 10:04

## 2020-04-16 NOTE — ASSESSMENT & PLAN NOTE
54M with IVDU with Hep C, hx of MRSA (V sens), Pancreatitis, DM2, who presents as a transfer from [facility] for multifocal apical cavitations and opacities, assoc. w/ air +fluid, suspected due to MRSA pneumonia c/b parapneumonic empyema and possible septic embolization. covid -, s/d 3/28   Remains stable on vanc and zosyn, cultures remain clear. Disposition- unable to place to  due to hx of IVDU. Patient does not desire outside facility transfer.     RESOLVED as patient is stable on RA.    Plan:  - Recent Blood cultures remain negative, WBC count normal.    - Continue TB rule out, pending AFB samples. No acid fast bacilli on gram stain.   - Follow up with ID: continue broad spectrum therapy, on vanc and zosyn. STOP ceftriaxone on 4/3, per ID, lactobacillus in pleural fluid can be resistant, also with E Coli/Klebsiella.  -plans to discharge with IV vanc + cefpedoxime or augmentin for 4-6 weeks total (end date 05/09/2020). Repeat imaging in 4 weeks and may require doxycyline following re imaging if worsening.   - If patient leaves AMA, can offer linezolid, but would be suboptimal treatment.   - CTS consulted, appreciate assistance: unlikely to perform intervention as he has had good output and decrease on O2 requirements. TTE negative.  - TPA per pulmonology, received 5/5 doses with good output in chest tube (placed 03/30) and improvement in respiratory status. CT pulled.

## 2020-04-16 NOTE — PROGRESS NOTES
"[facility] Medicine  Progress Note    Patient Name: Cali Mabry  MRN: [id number]  Patient Class: IP- Inpatient   Admission Date: 3/26/2020  Length of Stay: 20 days  Attending Physician: Jered Diehl MD  Primary Care Provider: Primary Doctor No        Subjective:     Principal Problem:Acute respiratory failure with hypoxia        HPI:  Cali Mabry is a 54 y.o. with DM II, hepatitis C, HTN, polysubstance abuse/IVDU, pancreatitis who presents, after transfer from [facility], with a 2 day history of chest pain and shortness of breath.  Chest pain is centralized and just described as "painful." Pain aggravated with movement and deep breathing. He reports an intermittent cough that is non-productive. He denies sick contacts, fever, chills, sore throat. Upon questioning, patient stated "I have the virus so just treat me for it." Educated patient that testing is still in process. Inquired about IV drug use however he was unable to recall when he last used. Patient swatted examiner's hand after chest palpation, yelling "that hurts!" Patient became aggravated, reported his pain was now worse and requested pain medication. Per nurse, patient given PRN dose an hour prior. Patient encouraged to continue discussing his plan of care however stated, "I don't want to chat."   ED: Afebrile with leukocytosis. CTA chest from 3/26 with dense consolidative changes within the right lower lobe with internal cavitation containing air and fluid; Occlusion is seen of the right lower lobe bronchi which may be secondary to mucoid impaction or aspirated material; Additional multifocal opacities and cavitations.  Vancomycin and Zosyn administered. On 2L NC. VSS. Admitted to hospital medicine for further evaluation and COVID-19 rule out.        Overview/Hospital Course:  55 yo male with IVDU with Hep C, hx of MRSA (V sens), Pancreatitis, DM2, who presents as a transfer from [facility] for SOB and CP, CT + " "multifocal apical cavitations and opacities, assoc. w/ air +fluid, suspected due to MRSA pneumonia c/b parapneumonic empyema and possible septic embolization. covid -, s/d 3/28. Chest tube placed 3/30 qhs on TPA x 6 doses (2/6 doses on 3/30 and 3/29). CTS no intervention for now. TTE negative. Chest tube was pulled. ID's final recommendations included: plans todischarge with IV vanc + cefpedoxime or augmentin for 4-6 weeks total (end date 05/09/2020). Repeat imaging in 4 weeks and may require doxycyline following re imaging if worsening. If patient leaves AMA, can offer linezolid, but that would be suboptimal treatment.     Throughout hospital stay, patient's insulin was titrated to maintain -180.    04/14/2020 NAEON. He reports feeling well. Has some mild left hip soreness that he describes similar to "cheyenne horse". Otherwise tolerating diet. Getting out of bed.     04/15/2020 NAEON. He is eating drinking well. Ambulating.     04/16/2020 NAEON. Doing well. Likes diet coke. Ambulating.     Interval History: 04/16/2020 NAEON. Doing well. Likes diet coke. Ambulating.     Review of Systems   Constitutional: Negative for chills, fatigue and fever.   Allergic/Immunologic: Negative for immunocompromised state.     Objective:     Vital Signs (Most Recent):  Temp: 98 °F (36.7 °C) (04/16/20 1245)  Pulse: 84 (04/16/20 1245)  Resp: 18 (04/16/20 1245)  BP: 118/64 (04/16/20 1245)  SpO2: 99 % (04/16/20 1245) Vital Signs (24h Range):  Temp:  [97.6 °F (36.4 °C)-98.3 °F (36.8 °C)] 98 °F (36.7 °C)  Pulse:  [84-86] 84  Resp:  [17-18] 18  SpO2:  [97 %-100 %] 99 %  BP: ()/(59-64) 118/64     Weight: 58.7 kg (129 lb 6.6 oz)  Body mass index is 19.68 kg/m².    Intake/Output Summary (Last 24 hours) at 4/16/2020 1413  Last data filed at 4/16/2020 1000  Gross per 24 hour   Intake 450 ml   Output --   Net 450 ml      Physical Exam   Constitutional: He is oriented to person, place, and time. He appears well-developed. No distress. "   Thin   HENT:   Head: Normocephalic and atraumatic.   Eyes: Pupils are equal, round, and reactive to light. EOM are normal.   Neck: Normal range of motion. No tracheal deviation present. No thyromegaly present.   Cardiovascular: Normal rate and normal heart sounds.   No murmur heard.  Pulmonary/Chest: Effort normal and breath sounds normal. No respiratory distress. He has no rales.   Abdominal: Bowel sounds are normal. He exhibits no distension.   Musculoskeletal: Normal range of motion. He exhibits no edema.   Neurological: He is alert and oriented to person, place, and time. No cranial nerve deficit.   Skin: He is not diaphoretic.   Nursing note and vitals reviewed.      Significant Labs:   CBC:   Recent Labs   Lab 04/15/20  0543   WBC 6.25   HGB 9.2*   HCT 30.9*   *     CMP:   Recent Labs   Lab 04/15/20  0543 04/16/20  0955   * 136   K 5.3* 5.0    104   CO2 25 23   * 240*   BUN 34* 29*   CREATININE 1.5* 1.3   CALCIUM 9.2 8.8   PROT 8.2  --    ALBUMIN 2.6*  --    BILITOT 0.3  --    ALKPHOS 74  --    AST 52*  --    ALT 67*  --    ANIONGAP 9 9   EGFRNONAA 52.4* >60.0       Significant Imaging: I have reviewed and interpreted all pertinent imaging results/findings within the past 24 hours.      Assessment/Plan:      * Acute respiratory failure with hypoxia  54M with IVDU with Hep C, hx of MRSA (V sens), Pancreatitis, DM2, who presents as a transfer from [facility] for multifocal apical cavitations and opacities, assoc. w/ air +fluid, suspected due to MRSA pneumonia c/b parapneumonic empyema and possible septic embolization. covid -, s/d 3/28   Remains stable on vanc and zosyn, cultures remain clear. Disposition- unable to place to  due to hx of IVDU. Patient does not desire outside facility transfer.     RESOLVED as patient is stable on RA.    Plan:  - Recent Blood cultures remain negative, WBC count normal.    - Continue TB rule out, pending AFB samples. No acid fast bacilli on gram  stain.   - Follow up with ID: continue broad spectrum therapy, on vanc and zosyn. STOP ceftriaxone on 4/3, per ID, lactobacillus in pleural fluid can be resistant, also with E Coli/Klebsiella.  -plans to discharge with IV vanc + cefpedoxime or augmentin for 4-6 weeks total (end date 05/09/2020). Repeat imaging in 4 weeks and may require doxycyline following re imaging if worsening.   - If patient leaves AMA, can offer linezolid, but would be suboptimal treatment.   - CTS consulted, appreciate assistance: unlikely to perform intervention as he has had good output and decrease on O2 requirements. TTE negative.  - TPA per pulmonology, received 5/5 doses with good output in chest tube (placed 03/30) and improvement in respiratory status. CT pulled.    Septic embolism  TTE negative  Neuro checks q4h.       Parapneumonic effusion  See above       Hyponatremia  Improved with blood sugar control and fluid hydration. Likely related to hyperglycemia as corrected Na within normal.   Component of decreased effective art volume    Continue to trend     Reactive thrombocytosis  Likely 2/2 infection  Or possible bleeding, please see anemia   Trend CBC    Iron deficiency anemia        Opioid abuse  Cautious pain control. Continue Percocet 10mg Q6H PRN.      Tobacco abuse        Chronic hepatitis C        Anemia of chronic disease        Intravenous drug abuse    -counseled on cessation      Hyperlipidemia  - continue statin       Normocytic normochromic anemia  Hb 6.5 on 4/4/2020 s/p transfusion x 1 prbc  Stable with appropriate response Hb.   No active bleeding  Type/cross  Iron studies consistent with Iron deficiency and ACD        Essential hypertension  Stable, not on meds       Type 2 diabetes mellitus, uncontrolled  - HgbA1c 11%  - Insulin adjusted to 20U levemir QHS, 18U levemir AM, 8U aspart TID   - LDSII  - BG goal 140-180    Opioid dependence, continuous          VTE Risk Mitigation (From admission, onward)          Ordered     enoxaparin injection 40 mg  Daily      04/04/20 0800     IP VTE HIGH RISK PATIENT  Once      03/27/20 1511                      Matthew Blackwell MD  Department of Hospital Medicine   [facility]

## 2020-04-16 NOTE — SUBJECTIVE & OBJECTIVE
Interval History: 04/16/2020 JOSE E. Doing well. Likes diet coke. Ambulating.     Review of Systems   Constitutional: Negative for chills, fatigue and fever.   Allergic/Immunologic: Negative for immunocompromised state.     Objective:     Vital Signs (Most Recent):  Temp: 98 °F (36.7 °C) (04/16/20 1245)  Pulse: 84 (04/16/20 1245)  Resp: 18 (04/16/20 1245)  BP: 118/64 (04/16/20 1245)  SpO2: 99 % (04/16/20 1245) Vital Signs (24h Range):  Temp:  [97.6 °F (36.4 °C)-98.3 °F (36.8 °C)] 98 °F (36.7 °C)  Pulse:  [84-86] 84  Resp:  [17-18] 18  SpO2:  [97 %-100 %] 99 %  BP: ()/(59-64) 118/64     Weight: 58.7 kg (129 lb 6.6 oz)  Body mass index is 19.68 kg/m².    Intake/Output Summary (Last 24 hours) at 4/16/2020 1413  Last data filed at 4/16/2020 1000  Gross per 24 hour   Intake 450 ml   Output --   Net 450 ml      Physical Exam   Constitutional: He is oriented to person, place, and time. He appears well-developed. No distress.   Thin   HENT:   Head: Normocephalic and atraumatic.   Eyes: Pupils are equal, round, and reactive to light. EOM are normal.   Neck: Normal range of motion. No tracheal deviation present. No thyromegaly present.   Cardiovascular: Normal rate and normal heart sounds.   No murmur heard.  Pulmonary/Chest: Effort normal and breath sounds normal. No respiratory distress. He has no rales.   Abdominal: Bowel sounds are normal. He exhibits no distension.   Musculoskeletal: Normal range of motion. He exhibits no edema.   Neurological: He is alert and oriented to person, place, and time. No cranial nerve deficit.   Skin: He is not diaphoretic.   Nursing note and vitals reviewed.      Significant Labs:   CBC:   Recent Labs   Lab 04/15/20  0543   WBC 6.25   HGB 9.2*   HCT 30.9*   *     CMP:   Recent Labs   Lab 04/15/20  0543 04/16/20  0955   * 136   K 5.3* 5.0    104   CO2 25 23   * 240*   BUN 34* 29*   CREATININE 1.5* 1.3   CALCIUM 9.2 8.8   PROT 8.2  --    ALBUMIN 2.6*  --     BILITOT 0.3  --    ALKPHOS 74  --    AST 52*  --    ALT 67*  --    ANIONGAP 9 9   EGFRNONAA 52.4* >60.0       Significant Imaging: I have reviewed and interpreted all pertinent imaging results/findings within the past 24 hours.

## 2020-04-16 NOTE — CARE UPDATE
04/16/20 0819   Patient Assessment/Suction   Level of Consciousness (AVPU) alert   Respiratory Effort Normal;Unlabored   Expansion/Accessory Muscles/Retractions no retractions   All Lung Fields Breath Sounds clear   PRE-TX-O2   O2 Device (Oxygen Therapy) room air   SpO2 98 %   Pulse Oximetry Type Intermittent   $ Pulse Oximetry - Single Charge Pulse Oximetry - Single   Oximetry Probe Site Applied   Pulse 85   Resp 18   Respiratory Evaluation   $ Care Plan Tech Time 15 min

## 2020-04-16 NOTE — PROGRESS NOTES
Pharmacokinetic Assessment Follow Up: IV Vancomycin    Vancomycin serum concentration assessment(s):    The trough level= 13.0 mcg/mL was drawn correctly and can be used to guide therapy at this time. The measurement is below the desired definitive target range of 15 to 20 mcg/mL.    Vancomycin Regimen Plan:    Change regimen to Vancomycin 1500 mg IV every 24 hours with next serum trough concentration measured at 1930 prior to 3rd dose on 4/18    Drug levels (last 3 results):  Recent Labs   Lab Result Units 04/14/20  1944 04/15/20  1920   Vancomycin-Trough ug/mL 11.7 13.0       Pharmacy will continue to follow and monitor vancomycin.    Please contact pharmacy at extension [phone] for questions regarding this assessment.    Thank you for the consult,   Evie Durham     Patient brief summary:  Cali Mabry is a 54 y.o. male initiated on antimicrobial therapy with IV Vancomycin for treatment of septic embolism    Drug Allergies:   Review of patient's allergies indicates:  No Known Allergies    Actual Body Weight:   58.7 kg    Renal Function:   Estimated Creatinine Clearance: 47.3 mL/min (A) (based on SCr of 1.5 mg/dL (H)).,     Dialysis Method (if applicable):  N/A    CBC (last 72 hours):  Recent Labs   Lab Result Units 04/15/20  0543   WBC K/uL 6.25   Hemoglobin g/dL 9.2*   Hematocrit % 30.9*   Platelets K/uL 364*   Gran% % 61.2   Lymph% % 27.8   Mono% % 9.4   Eosinophil% % 0.3   Basophil% % 0.3   Differential Method  Automated       Metabolic Panel (last 72 hours):  Recent Labs   Lab Result Units 04/15/20  0543   Sodium mmol/L 135*   Potassium mmol/L 5.3*   Chloride mmol/L 101   CO2 mmol/L 25   Glucose mg/dL 386*   BUN, Bld mg/dL 34*   Creatinine mg/dL 1.5*   Albumin g/dL 2.6*   Total Bilirubin mg/dL 0.3   Alkaline Phosphatase U/L 74   AST U/L 52*   ALT U/L 67*       Vancomycin Administrations:  vancomycin given in the last 96 hours                   vancomycin 1.25 g in dextrose 5% 250 mL IVPB (ready to  mix) (mg) 1,250 mg New Bag 04/15/20 1956     1,250 mg New Bag 04/14/20 2107     1,250 mg New Bag 04/13/20 2129                Microbiologic Results:  Microbiology Results (last 7 days)     Procedure Component Value Units Date/Time    Fungus culture [[id number]] Collected:  03/29/20 2137    Order Status:  Completed Specimen:  Body Fluid from Pleural Fluid Updated:  04/14/20 1126     Fungus (Mycology) Culture Culture in progress      No fungus isolated after 2 weeks    Susceptibility, Aerobic Bacteria  RYAN [[id number]]  (Abnormal) Collected:  04/09/20 1206    Order Status:  Completed Updated:  04/12/20 1039     Specimen Source and Organism Name pleural fluid and Lactobacillus rahamnos     Susceptibility, Aerobic Bacteria FINAL 04/12/2020 1036     Comment: SOURCE: PLEURAL FLUID, pleural fluid and Lactobacillus   rahamnos  SUSCEPTIBILITY, AEROBIC, RYAN                           FINAL  LACTOBACILLUS RHAMNOSUS    Organism identified by client.  ----------------------------------------------------------  Organism     LACTOBACILLUS RHAMNOSUS  Antibiotic  RYAN (mcg/mL)  Interpretation  ----------------------------------------------------------  Penicillin            1       S  Meropenem             8       R  ------------------------------------------------------------  S=SUSCEPTIBLE  I=INTERMEDIATE  R=RESISTANT  NS=NONSUSCEPTIBLE  SDD=SUSCEPTIBLE DOSE DEPENDENT  ------------------------------------------------------------  Test Performed by:  [address] [address]  : Doroteo Lynn M.D. Ph.D.; [facility]# [id number]

## 2020-04-17 PROBLEM — R63.4 WEIGHT LOSS, UNINTENTIONAL: Status: ACTIVE | Noted: 2020-04-17

## 2020-04-17 LAB
ALBUMIN SERPL BCP-MCNC: 2.5 G/DL (ref 3.5–5.2)
ALP SERPL-CCNC: 77 U/L (ref 55–135)
ALT SERPL W/O P-5'-P-CCNC: 69 U/L (ref 10–44)
ANION GAP SERPL CALC-SCNC: 7 MMOL/L (ref 8–16)
AST SERPL-CCNC: 47 U/L (ref 10–40)
BASOPHILS # BLD AUTO: 0.01 K/UL (ref 0–0.2)
BASOPHILS NFR BLD: 0.2 % (ref 0–1.9)
BILIRUB SERPL-MCNC: 0.3 MG/DL (ref 0.1–1)
BUN SERPL-MCNC: 29 MG/DL (ref 6–20)
CALCIUM SERPL-MCNC: 8.4 MG/DL (ref 8.7–10.5)
CHLORIDE SERPL-SCNC: 104 MMOL/L (ref 95–110)
CO2 SERPL-SCNC: 25 MMOL/L (ref 23–29)
CREAT SERPL-MCNC: 1.4 MG/DL (ref 0.5–1.4)
CRP SERPL-MCNC: 3.8 MG/L (ref 0–8.2)
DIFFERENTIAL METHOD: ABNORMAL
EOSINOPHIL # BLD AUTO: 0 K/UL (ref 0–0.5)
EOSINOPHIL NFR BLD: 0.7 % (ref 0–8)
ERYTHROCYTE [DISTWIDTH] IN BLOOD BY AUTOMATED COUNT: 17.7 % (ref 11.5–14.5)
EST. GFR  (AFRICAN AMERICAN): >60 ML/MIN/1.73 M^2
EST. GFR  (NON AFRICAN AMERICAN): 57 ML/MIN/1.73 M^2
GLUCOSE SERPL-MCNC: 349 MG/DL (ref 70–110)
HCT VFR BLD AUTO: 31 % (ref 40–54)
HGB BLD-MCNC: 9.1 G/DL (ref 14–18)
IMM GRANULOCYTES # BLD AUTO: 0.03 K/UL (ref 0–0.04)
IMM GRANULOCYTES NFR BLD AUTO: 0.5 % (ref 0–0.5)
LYMPHOCYTES # BLD AUTO: 1.5 K/UL (ref 1–4.8)
LYMPHOCYTES NFR BLD: 26.1 % (ref 18–48)
MCH RBC QN AUTO: 25.6 PG (ref 27–31)
MCHC RBC AUTO-ENTMCNC: 29.4 G/DL (ref 32–36)
MCV RBC AUTO: 87 FL (ref 82–98)
MONOCYTES # BLD AUTO: 0.6 K/UL (ref 0.3–1)
MONOCYTES NFR BLD: 9.8 % (ref 4–15)
NEUTROPHILS # BLD AUTO: 3.7 K/UL (ref 1.8–7.7)
NEUTROPHILS NFR BLD: 62.7 % (ref 38–73)
NRBC BLD-RTO: 0 /100 WBC
PLATELET # BLD AUTO: 299 K/UL (ref 150–350)
PMV BLD AUTO: 9.7 FL (ref 9.2–12.9)
POCT GLUCOSE: 234 MG/DL (ref 70–110)
POCT GLUCOSE: 268 MG/DL (ref 70–110)
POCT GLUCOSE: 371 MG/DL (ref 70–110)
POCT GLUCOSE: 78 MG/DL (ref 70–110)
POTASSIUM SERPL-SCNC: 4.3 MMOL/L (ref 3.5–5.1)
PROT SERPL-MCNC: 7.9 G/DL (ref 6–8.4)
RBC # BLD AUTO: 3.55 M/UL (ref 4.6–6.2)
SODIUM SERPL-SCNC: 136 MMOL/L (ref 136–145)
WBC # BLD AUTO: 5.83 K/UL (ref 3.9–12.7)

## 2020-04-17 PROCEDURE — 99233 PR SUBSEQUENT HOSPITAL CARE,LEVL III: ICD-10-PCS | Mod: GC,,, | Performed by: STUDENT IN AN ORGANIZED HEALTH CARE EDUCATION/TRAINING PROGRAM

## 2020-04-17 PROCEDURE — 80053 COMPREHEN METABOLIC PANEL: CPT

## 2020-04-17 PROCEDURE — 25000003 PHARM REV CODE 250: Performed by: STUDENT IN AN ORGANIZED HEALTH CARE EDUCATION/TRAINING PROGRAM

## 2020-04-17 PROCEDURE — 99233 SBSQ HOSP IP/OBS HIGH 50: CPT | Mod: GC,,, | Performed by: STUDENT IN AN ORGANIZED HEALTH CARE EDUCATION/TRAINING PROGRAM

## 2020-04-17 PROCEDURE — 94760 N-INVAS EAR/PLS OXIMETRY 1: CPT

## 2020-04-17 PROCEDURE — 99900035 HC TECH TIME PER 15 MIN (STAT)

## 2020-04-17 PROCEDURE — 25000003 PHARM REV CODE 250: Performed by: INTERNAL MEDICINE

## 2020-04-17 PROCEDURE — 11000001 HC ACUTE MED/SURG PRIVATE ROOM

## 2020-04-17 PROCEDURE — 86140 C-REACTIVE PROTEIN: CPT

## 2020-04-17 PROCEDURE — 63600175 PHARM REV CODE 636 W HCPCS: Performed by: STUDENT IN AN ORGANIZED HEALTH CARE EDUCATION/TRAINING PROGRAM

## 2020-04-17 PROCEDURE — 36415 COLL VENOUS BLD VENIPUNCTURE: CPT

## 2020-04-17 PROCEDURE — 25000003 PHARM REV CODE 250: Performed by: PHYSICIAN ASSISTANT

## 2020-04-17 PROCEDURE — 85025 COMPLETE CBC W/AUTO DIFF WBC: CPT

## 2020-04-17 RX ADMIN — INSULIN ASPART 1 UNITS: 100 INJECTION, SOLUTION INTRAVENOUS; SUBCUTANEOUS at 08:04

## 2020-04-17 RX ADMIN — HYDROXYZINE HYDROCHLORIDE 25 MG: 25 TABLET, FILM COATED ORAL at 02:04

## 2020-04-17 RX ADMIN — INSULIN ASPART 5 UNITS: 100 INJECTION, SOLUTION INTRAVENOUS; SUBCUTANEOUS at 08:04

## 2020-04-17 RX ADMIN — INSULIN DETEMIR 18 UNITS: 100 INJECTION, SOLUTION SUBCUTANEOUS at 08:04

## 2020-04-17 RX ADMIN — VANCOMYCIN HYDROCHLORIDE 1500 MG: 1.5 INJECTION, POWDER, LYOPHILIZED, FOR SOLUTION INTRAVENOUS at 08:04

## 2020-04-17 RX ADMIN — INSULIN ASPART 8 UNITS: 100 INJECTION, SOLUTION INTRAVENOUS; SUBCUTANEOUS at 08:04

## 2020-04-17 RX ADMIN — PIPERACILLIN AND TAZOBACTAM 4.5 G: 4; .5 INJECTION, POWDER, LYOPHILIZED, FOR SOLUTION INTRAVENOUS; PARENTERAL at 12:04

## 2020-04-17 RX ADMIN — INSULIN ASPART 8 UNITS: 100 INJECTION, SOLUTION INTRAVENOUS; SUBCUTANEOUS at 05:04

## 2020-04-17 RX ADMIN — ATORVASTATIN CALCIUM 20 MG: 20 TABLET, FILM COATED ORAL at 08:04

## 2020-04-17 RX ADMIN — OXYCODONE AND ACETAMINOPHEN 1 TABLET: 10; 325 TABLET ORAL at 08:04

## 2020-04-17 RX ADMIN — HYDROXYZINE HYDROCHLORIDE 25 MG: 25 TABLET, FILM COATED ORAL at 07:04

## 2020-04-17 RX ADMIN — SODIUM CHLORIDE 1000 ML: 0.9 INJECTION, SOLUTION INTRAVENOUS at 11:04

## 2020-04-17 RX ADMIN — OXYCODONE AND ACETAMINOPHEN 1 TABLET: 10; 325 TABLET ORAL at 02:04

## 2020-04-17 RX ADMIN — Medication 6 MG: at 08:04

## 2020-04-17 RX ADMIN — LIDOCAINE 1 PATCH: 50 PATCH CUTANEOUS at 08:04

## 2020-04-17 RX ADMIN — Medication 1 TABLET: at 08:04

## 2020-04-17 RX ADMIN — THERA TABS 1 TABLET: TAB at 08:04

## 2020-04-17 RX ADMIN — HYDROXYZINE HYDROCHLORIDE 25 MG: 25 TABLET, FILM COATED ORAL at 08:04

## 2020-04-17 RX ADMIN — PIPERACILLIN AND TAZOBACTAM 4.5 G: 4; .5 INJECTION, POWDER, LYOPHILIZED, FOR SOLUTION INTRAVENOUS; PARENTERAL at 05:04

## 2020-04-17 NOTE — PLAN OF CARE
Pt is AAOX4; no behavioral issues. Up ad checo    -bm this shift  -voids spontaneously  -receiving IV Vanc/zosyn  -good appetite  -pain and anxiety controlled with PO prn pain meds  -accuchecsk labile. Spoke with pt on diet education, wctm  -VSS  -resp status stable, on room air  -plans todischarge with IV vanc + cefpedoxime or augmentin for 4-6 weeks total (end date 05/09/2020).

## 2020-04-17 NOTE — ASSESSMENT & PLAN NOTE
54M with IVDU with Hep C, hx of MRSA (V sens), Pancreatitis, DM2, who presents as a transfer from [facility] for multifocal apical cavitations and opacities, assoc. w/ air +fluid, suspected due to MRSA pneumonia c/b parapneumonic empyema and possible septic embolization. covid -, s/d 3/28   Remains stable on vanc and zosyn, cultures remain clear. Disposition- unable to place to  due to hx of IVDU. Patient does not desire outside facility transfer.     RESOLVED as patient is stable on RA.    Plan:  - Recent Blood cultures remain negative, WBC count normal.    - Continue TB rule out, pending AFB samples. No acid fast bacilli on gram stain.   - Follow up with ID: continue broad spectrum therapy, on vanc and zosyn. STOP ceftriaxone on 4/3, per ID, lactobacillus in pleural fluid can be resistant, also with E Coli/Klebsiella.  -plans to discharge with IV vanc + cefpedoxime or augmentin for 4-6 weeks total (end date 05/09/2020). Repeat imaging in 4 weeks and may require doxycyline following re imaging if worsening.   - If patient leaves AMA, can offer linezolid, but would be suboptimal treatment.   - CTS consulted, appreciate assistance: unlikely to perform intervention as he has had good output and decrease on O2 requirements. TTE negative.  - TPA per pulmonology, received 5/5 doses with good output in chest tube (placed 03/30) and improvement in respiratory status. CT pulled.

## 2020-04-17 NOTE — PROGRESS NOTES
"[facility] Medicine  Progress Note    Patient Name: Cali Mabry  MRN: [id number]  Patient Class: IP- Inpatient   Admission Date: 3/26/2020  Length of Stay: 21 days  Attending Physician: Jered Diehl MD  Primary Care Provider: Primary Doctor No        Subjective:     Principal Problem:Acute respiratory failure with hypoxia        HPI:  Cali Mabry is a 54 y.o. with DM II, hepatitis C, HTN, polysubstance abuse/IVDU, pancreatitis who presents, after transfer from [facility], with a 2 day history of chest pain and shortness of breath.  Chest pain is centralized and just described as "painful." Pain aggravated with movement and deep breathing. He reports an intermittent cough that is non-productive. He denies sick contacts, fever, chills, sore throat. Upon questioning, patient stated "I have the virus so just treat me for it." Educated patient that testing is still in process. Inquired about IV drug use however he was unable to recall when he last used. Patient swatted examiner's hand after chest palpation, yelling "that hurts!" Patient became aggravated, reported his pain was now worse and requested pain medication. Per nurse, patient given PRN dose an hour prior. Patient encouraged to continue discussing his plan of care however stated, "I don't want to chat."   ED: Afebrile with leukocytosis. CTA chest from 3/26 with dense consolidative changes within the right lower lobe with internal cavitation containing air and fluid; Occlusion is seen of the right lower lobe bronchi which may be secondary to mucoid impaction or aspirated material; Additional multifocal opacities and cavitations.  Vancomycin and Zosyn administered. On 2L NC. VSS. Admitted to hospital medicine for further evaluation and COVID-19 rule out.        Overview/Hospital Course:  53 yo male with IVDU with Hep C, hx of MRSA (V sens), Pancreatitis, DM2, who presents as a transfer from [facility] for SOB and CP, CT + " "multifocal apical cavitations and opacities, assoc. w/ air +fluid, suspected due to MRSA pneumonia c/b parapneumonic empyema and possible septic embolization. covid -, s/d 3/28. Chest tube placed 3/30 qhs on TPA x 6 doses (2/6 doses on 3/30 and 3/29). CTS no intervention for now. TTE negative. Chest tube was pulled. ID's final recommendations included: plans todischarge with IV vanc + cefpedoxime or augmentin for 4-6 weeks total (end date 05/09/2020). Repeat imaging in 4 weeks and may require doxycyline following re imaging if worsening. If patient leaves AMA, can offer linezolid, but that would be suboptimal treatment.     Throughout hospital stay, patient's insulin was titrated to maintain -180.    04/14/2020 NAEON. He reports feeling well. Has some mild left hip soreness that he describes similar to "cheyenne horse". Otherwise tolerating diet. Getting out of bed.     04/15/2020 NAEON. He is eating drinking well. Ambulating.     04/16/2020 NAEON. Doing well. Likes diet coke. Ambulating.     04/17/2020 NAEON. Reporting some ongoing left hip discomfort for last several days. He is able to bear weight and ambulate. No swelling or tenderness. No fevers. Eating and tolerating diet.     Interval History:   04/17/2020 NAEON. Reporting some ongoing left hip discomfort for last several days. He is able to bear weight and ambulate. No swelling or tenderness. No fevers. Eating and tolerating diet.     Review of Systems   Constitutional: Negative for chills, fatigue and fever.   Allergic/Immunologic: Negative for immunocompromised state.     Objective:     Vital Signs (Most Recent):  Temp: 98.8 °F (37.1 °C) (04/17/20 0824)  Pulse: 90 (04/17/20 0824)  Resp: 16 (04/17/20 0824)  BP: 110/65 (04/17/20 0824)  SpO2: 100 % (04/17/20 0824) Vital Signs (24h Range):  Temp:  [97.8 °F (36.6 °C)-98.8 °F (37.1 °C)] 98.8 °F (37.1 °C)  Pulse:  [79-90] 90  Resp:  [16-18] 16  SpO2:  [97 %-100 %] 100 %  BP: (110-125)/(64-70) 110/65 "     Weight: 58.7 kg (129 lb 6.6 oz)  Body mass index is 19.68 kg/m².    Intake/Output Summary (Last 24 hours) at 4/17/2020 1019  Last data filed at 4/16/2020 2300  Gross per 24 hour   Intake 700 ml   Output 0 ml   Net 700 ml      Physical Exam   Constitutional: He is oriented to person, place, and time. He appears well-developed. No distress.   Thin   HENT:   Head: Normocephalic and atraumatic.   Eyes: Pupils are equal, round, and reactive to light. EOM are normal.   Neck: Normal range of motion. No tracheal deviation present. No thyromegaly present.   Cardiovascular: Normal rate and normal heart sounds.   No murmur heard.  Pulmonary/Chest: Effort normal and breath sounds normal. No respiratory distress. He has no rales.   Abdominal: Bowel sounds are normal. He exhibits no distension.   Musculoskeletal: Normal range of motion. He exhibits no edema.   Neurological: He is alert and oriented to person, place, and time. No cranial nerve deficit.   Skin: He is not diaphoretic.   Nursing note and vitals reviewed.      Significant Labs:   CBC:   Recent Labs   Lab 04/17/20  0528   WBC 5.83   HGB 9.1*   HCT 31.0*        CMP:   Recent Labs   Lab 04/16/20  0955 04/17/20  0528    136   K 5.0 4.3    104   CO2 23 25   * 349*   BUN 29* 29*   CREATININE 1.3 1.4   CALCIUM 8.8 8.4*   PROT  --  7.9   ALBUMIN  --  2.5*   BILITOT  --  0.3   ALKPHOS  --  77   AST  --  47*   ALT  --  69*   ANIONGAP 9 7*   EGFRNONAA >60.0 57.0*       Significant Imaging: I have reviewed and interpreted all pertinent imaging results/findings within the past 24 hours.      Assessment/Plan:      * Acute respiratory failure with hypoxia  54M with IVDU with Hep C, hx of MRSA (V sens), Pancreatitis, DM2, who presents as a transfer from [facility] for multifocal apical cavitations and opacities, assoc. w/ air +fluid, suspected due to MRSA pneumonia c/b parapneumonic empyema and possible septic embolization. covid -, s/d 3/28   Remains  stable on vanc and zosyn, cultures remain clear. Disposition- unable to place to  due to hx of IVDU. Patient does not desire outside facility transfer.     RESOLVED as patient is stable on RA.    Plan:  - Recent Blood cultures remain negative, WBC count normal.    - Continue TB rule out, pending AFB samples. No acid fast bacilli on gram stain.   - Follow up with ID: continue broad spectrum therapy, on vanc and zosyn. STOP ceftriaxone on 4/3, per ID, lactobacillus in pleural fluid can be resistant, also with E Coli/Klebsiella.  -plans to discharge with IV vanc + cefpedoxime or augmentin for 4-6 weeks total (end date 05/09/2020). Repeat imaging in 4 weeks and may require doxycyline following re imaging if worsening.   - If patient leaves AMA, can offer linezolid, but would be suboptimal treatment.   - CTS consulted, appreciate assistance: unlikely to perform intervention as he has had good output and decrease on O2 requirements. TTE negative.  - TPA per pulmonology, received 5/5 doses with good output in chest tube (placed 03/30) and improvement in respiratory status. CT pulled.    Septic embolism  TTE negative  Neuro checks q4h.       Parapneumonic effusion  See above       Hyponatremia  Improved with blood sugar control and fluid hydration. Likely related to hyperglycemia as corrected Na within normal.   Component of decreased effective art volume    Continue to trend     Reactive thrombocytosis  Likely 2/2 infection  Or possible bleeding, please see anemia   Trend CBC    Iron deficiency anemia        Opioid abuse  Cautious pain control. Continue Percocet 10mg Q6H PRN.      Tobacco abuse        Chronic hepatitis C        Anemia of chronic disease        Intravenous drug abuse    -counseled on cessation      Hyperlipidemia  - continue statin       Normocytic normochromic anemia  Hb 6.5 on 4/4/2020 s/p transfusion x 1 prbc  Stable with appropriate response Hb.   No active bleeding  Type/cross  Iron studies  consistent with Iron deficiency and ACD        Essential hypertension  Stable, not on meds       Type 2 diabetes mellitus, uncontrolled  - HgbA1c 11%  - Insulin adjusted to 20U levemir QHS, 18U levemir AM, 8U aspart TID   - LDSII  - BG goal 140-180    Opioid dependence, continuous          VTE Risk Mitigation (From admission, onward)         Ordered     enoxaparin injection 40 mg  Daily      04/04/20 0800     IP VTE HIGH RISK PATIENT  Once      03/27/20 5701                      Matthew Blackwell MD  Department of Hospital Medicine   [facility]

## 2020-04-17 NOTE — RESIDENT HANDOFF
Handoff     Primary Team: [facility] [place] [facility] MED 1 Room Number: 235/235 A     Patient Name: Cali Mabry MRN: [id number]     Date of Birth: [id number] Allergies: Patient has no known allergies.     Age: 54 y.o. Admit Date: 3/26/2020     Sex: male  BMI: Body mass index is 19.68 kg/m².     Code Status: Full Code        Illness Level (current clinical status): Watcher - No    Reason for Admission: Acute respiratory failure with hypoxia    Brief HPI (pertinent PMH and diagnosis or differential diagnosis):   55 yo male with IVDU with Hep C, hx of MRSA (V sens), Pancreatitis, DM2, who presents as a transfer from [facility] for SOB and CP, CT + multifocal apical cavitations and opacities, assoc. w/ air +fluid, suspected due to MRSA pneumonia c/b parapneumonic empyema and possible septic embolization. covid -, s/d 3/28. Chest tube placed 3/30 qhs on TPA x 6 doses (2/6 doses on 3/30 and 3/29). CTS no intervention for now. TTE negative. Chest tube was pulled. Treated with vanc for MRSA empyema and zosyn for pleural fluid + lactobacillus. TTE was negative.      Throughout hospital stay, patient's insulin was titrated to maintain -180.    Procedure Date: Chest tube 3/30 s/p TPA x 6/6 doses. No intervention by CTS    Hospital Course (updated, brief assessment by system or problem, significant events): see above  - Patient has required uptitration of insulin, with some lability noted, appears as DM1 rather than DM2. Now on 20 units long qhs and 18 units long am. With 8 units TID with meals.     Tasks (specific, using if-then statements):  - IV vanc and zosyn for 6 weeks end date 5/10/2020  - If able to discharge, can transition from zosyn to augmentin with same end date as above.   - If patient cavitary lesions not resolved after 5/10, he made need prolonged course of doxycycline      Contingency Plan (special circumstances anticipated and plan): NA    Estimated Discharge Date: Medically ready    Discharge Disposition: Still a  Patient    Mentored By: Dr Fazal Blackwell MD  PGY3 Medicine

## 2020-04-17 NOTE — PLAN OF CARE
"-Pt AAOx4  -Vital signs stable at beginning of shift.  Per pt- "Do not come into my room anymore this night to take my vitals I don't like being disturbed".    -BG monitoring ACHS  -IV vanc and zosyn continued per order  -Fall precautions maintained, non skid socks worn  -No acute events/falls/injuries this shift  -Pt aware to call for help with ambulating.    -Bed lowered, locked, siderails up x2, and call bell in reach.    See flowsheet for assessment findings.  Will continue to monitor pt.   "

## 2020-04-17 NOTE — PROGRESS NOTES
Pt refused PM vitals. In room. IV abx infusing. Requesting another hydroxyzine, but pt just had it 3 hours ago. No distress noted but agitation observed.

## 2020-04-17 NOTE — CARE UPDATE
04/17/20 0824   Patient Assessment/Suction   Level of Consciousness (AVPU) alert   Respiratory Effort Normal;Unlabored   Expansion/Accessory Muscles/Retractions no retractions;no use of accessory muscles   All Lung Fields Breath Sounds clear;equal bilaterally   Rhythm/Pattern, Respiratory depth regular;pattern regular;unlabored   PRE-TX-O2   O2 Device (Oxygen Therapy) room air   SpO2 100 %   Pulse Oximetry Type Intermittent   $ Pulse Oximetry - Single Charge Pulse Oximetry - Single   Pulse 90   Resp 16   Temp 98.8 °F (37.1 °C)   /65   Positioning HOB elevated 30 degrees   Aerosol Therapy   $ Aerosol Therapy Charges PRN treatment not required   General Safety Checklist   Safety Promotion/Fall Prevention side rails raised   Airway Safety   Ambu bag with the patient? Yes, Adult Ambu   Is mask with the patient? Yes, Adult Mask   O2  at bedside? Yes   Respiratory Evaluation   $ Care Plan Tech Time 15 min

## 2020-04-17 NOTE — SUBJECTIVE & OBJECTIVE
Interval History:   04/17/2020 JOSE E. Reporting some ongoing left hip discomfort for last several days. He is able to bear weight and ambulate. No swelling or tenderness. No fevers. Eating and tolerating diet.     Review of Systems   Constitutional: Negative for chills, fatigue and fever.   Allergic/Immunologic: Negative for immunocompromised state.     Objective:     Vital Signs (Most Recent):  Temp: 98.8 °F (37.1 °C) (04/17/20 0824)  Pulse: 90 (04/17/20 0824)  Resp: 16 (04/17/20 0824)  BP: 110/65 (04/17/20 0824)  SpO2: 100 % (04/17/20 0824) Vital Signs (24h Range):  Temp:  [97.8 °F (36.6 °C)-98.8 °F (37.1 °C)] 98.8 °F (37.1 °C)  Pulse:  [79-90] 90  Resp:  [16-18] 16  SpO2:  [97 %-100 %] 100 %  BP: (110-125)/(64-70) 110/65     Weight: 58.7 kg (129 lb 6.6 oz)  Body mass index is 19.68 kg/m².    Intake/Output Summary (Last 24 hours) at 4/17/2020 1019  Last data filed at 4/16/2020 2300  Gross per 24 hour   Intake 700 ml   Output 0 ml   Net 700 ml      Physical Exam   Constitutional: He is oriented to person, place, and time. He appears well-developed. No distress.   Thin   HENT:   Head: Normocephalic and atraumatic.   Eyes: Pupils are equal, round, and reactive to light. EOM are normal.   Neck: Normal range of motion. No tracheal deviation present. No thyromegaly present.   Cardiovascular: Normal rate and normal heart sounds.   No murmur heard.  Pulmonary/Chest: Effort normal and breath sounds normal. No respiratory distress. He has no rales.   Abdominal: Bowel sounds are normal. He exhibits no distension.   Musculoskeletal: Normal range of motion. He exhibits no edema.   Neurological: He is alert and oriented to person, place, and time. No cranial nerve deficit.   Skin: He is not diaphoretic.   Nursing note and vitals reviewed.      Significant Labs:   CBC:   Recent Labs   Lab 04/17/20  0528   WBC 5.83   HGB 9.1*   HCT 31.0*        CMP:   Recent Labs   Lab 04/16/20  0955 04/17/20  0528    136   K 5.0 4.3     104   CO2 23 25   * 349*   BUN 29* 29*   CREATININE 1.3 1.4   CALCIUM 8.8 8.4*   PROT  --  7.9   ALBUMIN  --  2.5*   BILITOT  --  0.3   ALKPHOS  --  77   AST  --  47*   ALT  --  69*   ANIONGAP 9 7*   EGFRNONAA >60.0 57.0*       Significant Imaging: I have reviewed and interpreted all pertinent imaging results/findings within the past 24 hours.

## 2020-04-17 NOTE — PLAN OF CARE
Discussed with ID (Dr. Pereira), can DC Zosyn and Continue Vanc until 5/9.  Further recommendations to follow tomorrow.     Jered Diehl M.D.  Internal Medicine  Pager [phone]

## 2020-04-17 NOTE — PLAN OF CARE
"Outside Transfer Acceptance Note        Patients name/MRN:     Domenic Mabry, MRN: [id number]     Referring Physician or Mid-Level provider giving report:   Dr. Jered Diehl     Ref[facility] Facility[facility]     Date/Time of Acceptance:    4/17/20 at 5:45pm     Accepting Physician for admission to hospital: PAWEL Mendieta MD ()     Accepting facility:    [facility]     Consulting Physicians from [facility] involved in case:   Dr. Cullen Jackman, hospitalist at [facility]    Reason for transfer request:    COVID negative capacity transfer  53 yo male with IVDU with Hep C, hx of MRSA (V sens), Pancreatitis, DM2, who presents as a transfer from [facility] for SOB and CP, CT + multifocal apical cavitations and opacities, assoc. w/ air +fluid, suspected due to MRSA pneumonia c/b parapneumonic empyema and possible septic embolization. covid -, s/d 3/28. Chest tube placed 3/30 qhs on TPA x 6 doses (2/6 doses on 3/30 and 3/29). CTS no intervention for now. TTE negative. Chest tube was pulled. Treated with vanc for MRSA empyema and zosyn for pleural fluid + lactobacillus. TTE was negative.     To Do List upon arrival:    - IV vanc and zosyn for 6 weeks end date 5/10/2020  - If able to discharge, can transition from zosyn to augmentin with same end date as above.   - If patient cavitary lesions not resolved after 5/10, he made need prolonged course of doxycycline    Vital signs: /70 (BP Location: Right arm, Patient Position: Lying)   Pulse 85   Temp 98.5 °F (36.9 °C) (Oral)   Resp 16   Ht 5' 8" (1.727 m)   Wt 58.7 kg (129 lb 6.6 oz)   SpO2 100%   BMI 19.68 kg/m²     Temp:  [98 °F (36.7 °C)-98.8 °F (37.1 °C)] 98.5 °F (36.9 °C)  Pulse:  [79-90] 85  Resp:  [16-18] 16  SpO2:  [97 %-100 %] 100 %  BP: (110-125)/(65-70) 118/70    Past Medical History:   Diagnosis Date    Diabetes mellitus     Hepatitis     HTN (hypertension)     Pancreatitis          Current Facility-Administered " Medications:     acetaminophen tablet 650 mg, 650 mg, Oral, Q8H PRN, Boyd Gray PA-C, 650 mg at 04/16/20 1538    albuterol-ipratropium 2.5 mg-0.5 mg/3 mL nebulizer solution 3 mL, 3 mL, Nebulization, Q4H PRN, PAWEL Mendieta MD    atorvastatin tablet 20 mg, 20 mg, Oral, Daily, PAWEL Mendieta MD, 20 mg at 04/17/20 0804    dextromethorphan-guaifenesin  mg/5 ml liquid 10 mL, 10 mL, Oral, Q4H PRN, Boyd Gray PA-C, 10 mL at 03/31/20 1819    dextrose 10% (D10W) Bolus, 12.5 g, Intravenous, PRN, Chapito Elizondo MD, Last Rate: 1,000 mL/hr at 03/29/20 1758, 125 mL at 03/29/20 1758    dextrose 10% (D10W) Bolus, 25 g, Intravenous, PRN, Chapito Elizondo MD, Last Rate: 1,000 mL/hr at 03/29/20 2337, 250 mL at 03/29/20 2337    dextrose 40 % gel 15,000 mg, 15 g, Oral, PRN, Cate Veloz MD, 15,000 mg at 03/29/20 2245    enoxaparin injection 40 mg, 40 mg, Subcutaneous, Daily, Matthew Blackwlel MD, Stopped at 04/16/20 2130    folic acid-vit B6-vit B12 2.5-25-2 mg tablet 1 tablet, 1 tablet, Oral, Daily, Matthew Blackwell MD, 1 tablet at 04/17/20 0804    hydroxyzine HCL tablet 25 mg, 25 mg, Oral, TID PRN, Matthew Blackwell MD, 25 mg at 04/17/20 1411    insulin aspart U-100 pen 0-5 Units, 0-5 Units, Subcutaneous, QID (AC + HS) PRN, Kelsey Gomez MD, 5 Units at 04/17/20 0810    insulin aspart U-100 pen 8 Units, 8 Units, Subcutaneous, TIDWM, Ayanna Edward MD, 8 Units at 04/17/20 1730    insulin detemir U-100 pen 18 Units, 18 Units, Subcutaneous, Daily, Ayanna Edward MD, 18 Units at 04/17/20 0810    insulin detemir U-100 pen 20 Units, 20 Units, Subcutaneous, QHS, Ayanna Edward MD, 20 Units at 04/16/20 2124    lidocaine 5 % patch 1 patch, 1 patch, Transdermal, Daily, Matthew Blackwell MD, 1 patch at 04/17/20 0803    loperamide capsule 2 mg, 2 mg, Oral, Q6H PRN, Boyd Gray PA-C    melatonin tablet 6 mg, 6 mg, Oral, Nightly PRN, Boyd Gray PA-C, 6 mg at 04/16/20 1939    multivitamin  tablet, 1 tablet, Oral, Daily, Matthew Blackwell MD, 1 tablet at 04/17/20 0804    ondansetron disintegrating tablet 8 mg, 8 mg, Oral, Q8H PRN, Boyd Gray PA-C, 8 mg at 04/03/20 0929    ondansetron injection 4 mg, 4 mg, Intravenous, Q8H PRN, Boyd Gray PA-C    oxyCODONE-acetaminophen  mg per tablet 1 tablet, 1 tablet, Oral, Q6H PRN, Ayanna Edward MD, 1 tablet at 04/17/20 1410    senna-docusate 8.6-50 mg per tablet 1 tablet, 1 tablet, Oral, Daily PRN, Matthew Blackwell MD, 1 tablet at 04/15/20 1322    sodium chloride 0.9% flush 10 mL, 10 mL, Intravenous, PRN, Chapito Elizondo MD    Pharmacy to dose Vancomycin consult, , , Once **AND** vancomycin - pharmacy to dose, , Intravenous, pharmacy to manage frequency, PAWEL Mendieta MD    vancomycin 1.5 g in dextrose 5 % 250 mL IVPB (ready to mix), 1,500 mg, Intravenous, Q24H, Jered Diehl MD, Last Rate: 166.7 mL/hr at 04/16/20 1939, 1,500 mg at 04/16/20 1939    LABS:  Recent Results (from the past 24 hour(s))   POCT glucose    Collection Time: 04/16/20  9:24 PM   Result Value Ref Range    POCT Glucose 226 (H) 70 - 110 mg/dL   CBC with Automated Differential    Collection Time: 04/17/20  5:28 AM   Result Value Ref Range    WBC 5.83 3.90 - 12.70 K/uL    RBC 3.55 (L) 4.60 - 6.20 M/uL    Hemoglobin 9.1 (L) 14.0 - 18.0 g/dL    Hematocrit 31.0 (L) 40.0 - 54.0 %    Mean Corpuscular Volume 87 82 - 98 fL    Mean Corpuscular Hemoglobin 25.6 (L) 27.0 - 31.0 pg    Mean Corpuscular Hemoglobin Conc 29.4 (L) 32.0 - 36.0 g/dL    RDW 17.7 (H) 11.5 - 14.5 %    Platelets 299 150 - 350 K/uL    MPV 9.7 9.2 - 12.9 fL    Immature Granulocytes 0.5 0.0 - 0.5 %    Gran # (ANC) 3.7 1.8 - 7.7 K/uL    Immature Grans (Abs) 0.03 0.00 - 0.04 K/uL    Lymph # 1.5 1.0 - 4.8 K/uL    Mono # 0.6 0.3 - 1.0 K/uL    Eos # 0.0 0.0 - 0.5 K/uL    Baso # 0.01 0.00 - 0.20 K/uL    nRBC 0 0 /100 WBC    Gran% 62.7 38.0 - 73.0 %    Lymph% 26.1 18.0 - 48.0 %    Mono% 9.8 4.0 - 15.0 %     Eosinophil% 0.7 0.0 - 8.0 %    Basophil% 0.2 0.0 - 1.9 %    Differential Method Automated    Comprehensive metabolic panel    Collection Time: 04/17/20  5:28 AM   Result Value Ref Range    Sodium 136 136 - 145 mmol/L    Potassium 4.3 3.5 - 5.1 mmol/L    Chloride 104 95 - 110 mmol/L    CO2 25 23 - 29 mmol/L    Glucose 349 (H) 70 - 110 mg/dL    BUN, Bld 29 (H) 6 - 20 mg/dL    Creatinine 1.4 0.5 - 1.4 mg/dL    Calcium 8.4 (L) 8.7 - 10.5 mg/dL    Total Protein 7.9 6.0 - 8.4 g/dL    Albumin 2.5 (L) 3.5 - 5.2 g/dL    Total Bilirubin 0.3 0.1 - 1.0 mg/dL    Alkaline Phosphatase 77 55 - 135 U/L    AST 47 (H) 10 - 40 U/L    ALT 69 (H) 10 - 44 U/L    Anion Gap 7 (L) 8 - 16 mmol/L    eGFR if African American >60.0 >60 mL/min/1.73 m^2    eGFR if non African American 57.0 (A) >60 mL/min/1.73 m^2   POCT glucose    Collection Time: 04/17/20  7:55 AM   Result Value Ref Range    POCT Glucose 371 (H) 70 - 110 mg/dL   C-reactive protein    Collection Time: 04/17/20 10:46 AM   Result Value Ref Range    CRP 3.8 0.0 - 8.2 mg/L   POCT glucose    Collection Time: 04/17/20 12:37 PM   Result Value Ref Range    POCT Glucose 78 70 - 110 mg/dL      PAWEL Mendieta MD  Department of Hospital Medicine  Patient Flow Center/   [phone]

## 2020-04-17 NOTE — PROGRESS NOTES
"[facility]  Adult Nutrition  Progress Note    SUMMARY       Recommendations    Recommendation/Intervention: Suggest increase calories to 2800 calorie double portions for weight maintenance.  Goals: Tolerate 85% diet and supplements.  Maintain weight.  Nutrition Goal Status: goal not met  Communication of RD Recs: reviewed with RN    Reason for Assessment    Reason For Assessment: length of stay  Diagnosis: other (see comments)(respiratory failure , empyema)  Relevant Medical History: DM, HLD, IVDU  Interdisciplinary Rounds: did not attend  General Information Comments: Patient has bee tolerating 75- 100% 2000 calorie diabetic diet with double portions and Boost Glucose Control with meals. His food preferences have been noted in diet orders.  He had some weight loss PTA.  He has lost 3-4 lbs in last month.  Nutrition Discharge Planning: Adequate PO intake on diabetic diet    Nutrition Risk Screen    Nutrition Risk Screen: no indicators present    Nutrition/Diet History    Patient Reported Diet/Restrictions/Preferences: diabetic diet  Spiritual, Cultural Beliefs, Anabaptist Practices, Values that Affect Care: no  Factors Affecting Nutritional Intake: None identified at this time    Anthropometrics    Temp: 98.8 °F (37.1 °C)  Height Method: Stated  Height: 5' 8" (172.7 cm)  Height (inches): 68 in  Weight Method: Bed Scale  Weight: 58.7 kg (129 lb 6.6 oz)  Weight (lb): 129.41 lb  Ideal Body Weight (IBW), Male: 154 lb  % Ideal Body Weight, Male (lb): 84.03 %  BMI (Calculated): 19.7  BMI Grade: 18.5-24.9 - normal  Weight Loss: unintentional       Lab/Procedures/Meds    Pertinent Labs Reviewed: reviewed  Pertinent Labs Comments: Na 135, Cr 23, Glu 185, Alb 2.1  Pertinent Medications Reviewed: reviewed  Pertinent Medications Comments: statin, folic acid, insulin, ondansetron, senna-docusate    Physical Findings/Assessment     unintentional weight loss    Estimated/Assessed Needs    Weight Used For Calorie Calculations: " 58.7 kg (129 lb 6.6 oz)  Energy Calorie Requirements (kcal): 2054- 2348(35- 40 kcals/kg)  Energy Need Method: Kcal/kg  Protein Requirements: 71- 88 gms  Weight Used For Protein Calculations: 58.7 kg (129 lb 6.6 oz)  Fluid Requirements (mL): 2054  Estimated Fluid Requirement Method: RDA Method  RDA Method (mL): 2054  CHO Requirement: 50%      Nutrition Prescription Ordered    Current Diet Order: 2000 calorie diabetic double portions  Nutrition Order Comments: 2000kcal  Oral Nutrition Supplement: Boost Glucose Control TID    Evaluation of Received Nutrient/Fluid Intake    I/O: -2.5L since admit  Energy Calories Required: meeting needs  Protein Required: meeting needs  Fluid Required: meeting needs  Comments: LBM 4/6  Tolerance: tolerating  % Intake of Estimated Energy Needs: 75 - 100 %  % Meal Intake: 75 - 100 %    Nutrition Risk    Level of Risk/Frequency of Follow-up: low - moderate     Assessment and Plan    Weight loss, unintentional  Contributing Nutrition Diagnosis  Unintentional weight loss    Related to (etiology):   Increased nutritional needs    Signs and Symptoms (as evidenced by):   Weight loss in last 3 weeks of 4 lbs with 100% oral intake and supplement     Interventions/Recommendations (treatment strategy):  Suggest increase calories to 2800 calorie diabetic double portions  Continue Boost Glucose Control supplement  Collaboration with providers    Nutrition Diagnosis Status:   Continues       Monitor and Evaluation    Food and Nutrient Intake: food and beverage intake  Food and Nutrient Adminstration: diet order  Knowledge/Beliefs/Attitudes: food and nutrition knowledge/skill  Anthropometric Measurements: height/length, weight, body mass index, weight change  Biochemical Data, Medical Tests and Procedures: electrolyte and renal panel, gastrointestinal profile, glucose/endocrine profile, inflammatory profile, lipid profile  Nutrition-Focused Physical Findings: overall appearance, head and eyes      Malnutrition Assessment       unintentional weight loss in last 3 weeks of 3- 4 lbs    BMI 19.68                  Nutrition Follow-Up    RD Follow-up?: Yes

## 2020-04-17 NOTE — ASSESSMENT & PLAN NOTE
Contributing Nutrition Diagnosis  Unintentional weight loss    Related to (etiology):   Increased nutritional needs    Signs and Symptoms (as evidenced by):   Weight loss in last 3 weeks of 4 lbs with 100% oral intake and supplement     Interventions/Recommendations (treatment strategy):  Suggest increase calories to 2800 calorie diabetic double portions  Continue Boost Glucose Control supplement  Collaboration with providers    Nutrition Diagnosis Status:   Continues

## 2020-04-18 ENCOUNTER — TELEPHONE (OUTPATIENT)
Dept: INFECTIOUS DISEASES | Facility: HOSPITAL | Age: 55
End: 2020-04-18

## 2020-04-18 PROBLEM — R63.4 WEIGHT LOSS, UNINTENTIONAL: Status: RESOLVED | Noted: 2020-04-17 | Resolved: 2020-04-18

## 2020-04-18 PROBLEM — D63.8 ANEMIA OF CHRONIC DISEASE: Chronic | Status: RESOLVED | Noted: 2017-11-23 | Resolved: 2020-04-18

## 2020-04-18 PROBLEM — M79.605 PAIN OF LEFT LOWER EXTREMITY: Status: ACTIVE | Noted: 2020-04-18

## 2020-04-18 PROBLEM — E87.1 HYPONATREMIA: Status: RESOLVED | Noted: 2019-04-13 | Resolved: 2020-04-18

## 2020-04-18 PROBLEM — D75.838 REACTIVE THROMBOCYTOSIS: Status: RESOLVED | Noted: 2020-04-04 | Resolved: 2020-04-18

## 2020-04-18 LAB
POCT GLUCOSE: 170 MG/DL (ref 70–110)
POCT GLUCOSE: 196 MG/DL (ref 70–110)
POCT GLUCOSE: 322 MG/DL (ref 70–110)
POCT GLUCOSE: 334 MG/DL (ref 70–110)
VANCOMYCIN TROUGH SERPL-MCNC: 15.5 UG/ML (ref 10–22)

## 2020-04-18 PROCEDURE — 63600175 PHARM REV CODE 636 W HCPCS: Performed by: STUDENT IN AN ORGANIZED HEALTH CARE EDUCATION/TRAINING PROGRAM

## 2020-04-18 PROCEDURE — 25000003 PHARM REV CODE 250: Performed by: PHYSICIAN ASSISTANT

## 2020-04-18 PROCEDURE — 94761 N-INVAS EAR/PLS OXIMETRY MLT: CPT

## 2020-04-18 PROCEDURE — 25000003 PHARM REV CODE 250: Performed by: STUDENT IN AN ORGANIZED HEALTH CARE EDUCATION/TRAINING PROGRAM

## 2020-04-18 PROCEDURE — 99900035 HC TECH TIME PER 15 MIN (STAT)

## 2020-04-18 PROCEDURE — 99233 PR SUBSEQUENT HOSPITAL CARE,LEVL III: ICD-10-PCS | Mod: ,,, | Performed by: INTERNAL MEDICINE

## 2020-04-18 PROCEDURE — C9399 UNCLASSIFIED DRUGS OR BIOLOG: HCPCS | Performed by: STUDENT IN AN ORGANIZED HEALTH CARE EDUCATION/TRAINING PROGRAM

## 2020-04-18 PROCEDURE — 80202 ASSAY OF VANCOMYCIN: CPT

## 2020-04-18 PROCEDURE — 99233 SBSQ HOSP IP/OBS HIGH 50: CPT | Mod: ,,, | Performed by: INTERNAL MEDICINE

## 2020-04-18 PROCEDURE — 25000003 PHARM REV CODE 250: Performed by: INTERNAL MEDICINE

## 2020-04-18 PROCEDURE — 11000001 HC ACUTE MED/SURG PRIVATE ROOM

## 2020-04-18 RX ORDER — GABAPENTIN 300 MG/1
300 CAPSULE ORAL 3 TIMES DAILY
Status: DISCONTINUED | OUTPATIENT
Start: 2020-04-18 | End: 2020-04-22 | Stop reason: HOSPADM

## 2020-04-18 RX ADMIN — GABAPENTIN 300 MG: 300 CAPSULE ORAL at 09:04

## 2020-04-18 RX ADMIN — HYDROXYZINE HYDROCHLORIDE 25 MG: 25 TABLET, FILM COATED ORAL at 06:04

## 2020-04-18 RX ADMIN — INSULIN ASPART 8 UNITS: 100 INJECTION, SOLUTION INTRAVENOUS; SUBCUTANEOUS at 12:04

## 2020-04-18 RX ADMIN — OXYCODONE AND ACETAMINOPHEN 1 TABLET: 10; 325 TABLET ORAL at 10:04

## 2020-04-18 RX ADMIN — Medication 6 MG: at 10:04

## 2020-04-18 RX ADMIN — VANCOMYCIN HYDROCHLORIDE 1500 MG: 1.5 INJECTION, POWDER, LYOPHILIZED, FOR SOLUTION INTRAVENOUS at 09:04

## 2020-04-18 RX ADMIN — GABAPENTIN 300 MG: 300 CAPSULE ORAL at 04:04

## 2020-04-18 RX ADMIN — INSULIN ASPART 4 UNITS: 100 INJECTION, SOLUTION INTRAVENOUS; SUBCUTANEOUS at 12:04

## 2020-04-18 RX ADMIN — THERA TABS 1 TABLET: TAB at 10:04

## 2020-04-18 RX ADMIN — INSULIN ASPART 4 UNITS: 100 INJECTION, SOLUTION INTRAVENOUS; SUBCUTANEOUS at 10:04

## 2020-04-18 RX ADMIN — LIDOCAINE 1 PATCH: 50 PATCH CUTANEOUS at 10:04

## 2020-04-18 RX ADMIN — INSULIN DETEMIR 18 UNITS: 100 INJECTION, SOLUTION SUBCUTANEOUS at 10:04

## 2020-04-18 RX ADMIN — HYDROXYZINE HYDROCHLORIDE 25 MG: 25 TABLET, FILM COATED ORAL at 10:04

## 2020-04-18 RX ADMIN — Medication 1 TABLET: at 10:04

## 2020-04-18 RX ADMIN — OXYCODONE AND ACETAMINOPHEN 1 TABLET: 10; 325 TABLET ORAL at 04:04

## 2020-04-18 RX ADMIN — INSULIN ASPART 8 UNITS: 100 INJECTION, SOLUTION INTRAVENOUS; SUBCUTANEOUS at 04:04

## 2020-04-18 RX ADMIN — OXYCODONE AND ACETAMINOPHEN 1 TABLET: 10; 325 TABLET ORAL at 02:04

## 2020-04-18 RX ADMIN — ATORVASTATIN CALCIUM 20 MG: 20 TABLET, FILM COATED ORAL at 10:04

## 2020-04-18 NOTE — ASSESSMENT & PLAN NOTE
Likely radiculopathy which he says has had issues before, also c/o neuropathy  Start neurontin 300 mg tid

## 2020-04-18 NOTE — PROGRESS NOTES
"[facility] Hospital Medicine  Progress Note    Patient Name: Cali Mabry  MRN: [id number]  Patient Class: IP- Inpatient   Admission Date: 3/26/2020  Length of Stay: 22 days  Attending Physician: Beatriz Gerber MD  Primary Care Provider: Primary Doctor No        Subjective:     Principal Problem:Acute respiratory failure with hypoxia        HPI:  Cali Mabry is a 54 y.o. with DM II, hepatitis C, HTN, polysubstance abuse/IVDU, pancreatitis who presents, after transfer from [facility], with a 2 day history of chest pain and shortness of breath.  Chest pain is centralized and just described as "painful." Pain aggravated with movement and deep breathing. He reports an intermittent cough that is non-productive. He denies sick contacts, fever, chills, sore throat. Upon questioning, patient stated "I have the virus so just treat me for it." Educated patient that testing is still in process. Inquired about IV drug use however he was unable to recall when he last used. Patient swatted examiner's hand after chest palpation, yelling "that hurts!" Patient became aggravated, reported his pain was now worse and requested pain medication. Per nurse, patient given PRN dose an hour prior. Patient encouraged to continue discussing his plan of care however stated, "I don't want to chat."   ED: Afebrile with leukocytosis. CTA chest from 3/26 with dense consolidative changes within the right lower lobe with internal cavitation containing air and fluid; Occlusion is seen of the right lower lobe bronchi which may be secondary to mucoid impaction or aspirated material; Additional multifocal opacities and cavitations.  Vancomycin and Zosyn administered. On 2L NC. VSS. Admitted to hospital medicine for further evaluation and COVID-19 rule out.        Overview/Hospital Course:  55 yo male with IVDU with Hep C, hx of MRSA (V sens), Pancreatitis, DM2, who presents as a transfer from [facility] for SOB and CP, CT + " "multifocal apical cavitations and opacities, assoc. w/ air +fluid, suspected due to MRSA pneumonia c/b parapneumonic empyema and possible septic embolization. covid -, s/d 3/28. Chest tube placed 3/30 qhs on TPA x 6 doses (2/6 doses on 3/30 and 3/29). CTS no intervention for now. TTE negative. Chest tube was pulled. ID's final recommendations included: plans todischarge with IV vanc + cefpedoxime or augmentin for 4-6 weeks total (end date 05/09/2020). Repeat imaging in 4 weeks and may require doxycyline following re imaging if worsening. If patient leaves AMA, can offer linezolid, but that would be suboptimal treatment.     Throughout hospital stay, patient's insulin was titrated to maintain -180.    04/14/2020 NAEON. He reports feeling well. Has some mild left hip soreness that he describes similar to "cheyenne horse". Otherwise tolerating diet. Getting out of bed.     04/15/2020 NAEON. He is eating drinking well. Ambulating.     04/16/2020 NAEON. Doing well. Likes diet coke. Ambulating.     04/17/2020 NAEON. Reporting some ongoing left hip discomfort for last several days. He is able to bear weight and ambulate. No swelling or tenderness. No fevers. Eating and tolerating diet.     Interval History: c/o pain in left hip down to leg, says shooting pain,catches him in certain position, has h/o problem with left leg for a couple of months, c/o neuropathy in feet, able to walk without issues.    Review of Systems   Constitutional: Negative for chills and fever.   HENT: Negative for sinus pain and trouble swallowing.    Respiratory: Positive for cough. Negative for shortness of breath.    Cardiovascular: Negative for chest pain and leg swelling.   Gastrointestinal: Negative for abdominal pain, blood in stool, constipation, diarrhea, nausea and vomiting.   Genitourinary: Negative for dysuria and hematuria.   Musculoskeletal: Positive for arthralgias and myalgias. Negative for gait problem.   Skin: Negative for rash. "   Neurological: Negative for numbness and headaches.   Psychiatric/Behavioral: Negative for confusion.     Objective:     Vital Signs (Most Recent):  Temp: 98.9 °F (37.2 °C) (04/18/20 1539)  Pulse: 92 (04/18/20 1539)  Resp: 18 (04/18/20 1539)  BP: 109/62 (04/18/20 1539)  SpO2: 95 % (04/18/20 1539) Vital Signs (24h Range):  Temp:  [97.8 °F (36.6 °C)-98.9 °F (37.2 °C)] 98.9 °F (37.2 °C)  Pulse:  [78-92] 92  Resp:  [16-18] 18  SpO2:  [95 %-100 %] 95 %  BP: (109-120)/(57-84) 109/62     Weight: 58.7 kg (129 lb 6.6 oz)  Body mass index is 19.68 kg/m².  No intake or output data in the 24 hours ending 04/18/20 1552   Physical Exam   Constitutional: He is oriented to person, place, and time. No distress.   HENT:   Head: Normocephalic and atraumatic.   Eyes: Pupils are equal, round, and reactive to light. EOM are normal.   Neck: Normal range of motion. Neck supple.   Cardiovascular: Normal rate, regular rhythm and normal heart sounds.   Pulmonary/Chest: Effort normal. No respiratory distress.   Bilateral crackles in back   Abdominal: Soft. Bowel sounds are normal. He exhibits no distension. There is no tenderness.   Musculoskeletal: Normal range of motion. He exhibits no edema.   Neurological: He is alert and oriented to person, place, and time. No cranial nerve deficit.   Decreased sensation in bilateral feet, full rom of left leg, no back tenderness   Skin: Skin is warm.   Psychiatric: He has a normal mood and affect.   Vitals reviewed.      Significant Labs:   CBC:   Recent Labs   Lab 04/17/20 0528   WBC 5.83   HGB 9.1*   HCT 31.0*        CMP:   Recent Labs   Lab 04/17/20 0528      K 4.3      CO2 25   *   BUN 29*   CREATININE 1.4   CALCIUM 8.4*   PROT 7.9   ALBUMIN 2.5*   BILITOT 0.3   ALKPHOS 77   AST 47*   ALT 69*   ANIONGAP 7*   EGFRNONAA 57.0*       Significant Imaging: I have reviewed all pertinent imaging results/findings within the past 24 hours.      Assessment/Plan:      * Acute  respiratory failure with hypoxia  RESOLVED as patient is stable on RA.      - Recent Blood cultures remain negative, WBC count normal.    - Continue TB rule out, pending AFB samples. No acid fast bacilli on gram stain.   - Follow up with ID: continue broad spectrum therapy, on vanc and zosyn. STOP ceftriaxone on 4/3, per ID, lactobacillus in pleural fluid can be resistant, also with E Coli/Klebsiella, zosyn stopped per id recs on 4/18/20 as adequate treatment for klebsiella pna and lactobacilus empyema  -continue vancomycin(end date 05/09/2020). Repeat imaging in 4 weeks and may require doxycyline following re imaging if worsening.   · Repeat CT chest 5/8/2020  · If persistent cavitary lesions in CT chest at end of 6 week IV vancomycin course, may need to transition to oral doxycycline 100 mg PO BID until resolution of cavitary lesions  - If patient leaves AMA, can offer linezolid, but would be suboptimal treatment.   - CTS consulted, appreciate assistance: unlikely to perform intervention as he has had good output and decrease on O2 requirements. TTE negative.  - TPA per pulmonology, received 5/5 doses with good output in chest tube (placed 03/30) and improvement in respiratory status. CT pulled.    Pain of left lower extremity  Likely radiculopathy which he says has had issues before, also c/o neuropathy  Start neurontin 300 mg tid       Iron deficiency anemia  S/p transfusion, monitor      Parapneumonic effusion  See above       Septic embolism  TTE negative  Continue antibiotics      RAMYA (acute kidney injury)  Improved with fluids,  Continue to trend.        Opioid abuse  Cautious pain control. Continue Percocet 10mg Q6H PRN.      Tobacco abuse  Advise to quit      Chronic hepatitis C        Intravenous drug abuse    -counseled on cessation      Hyperlipidemia  - continue statin       Normocytic normochromic anemia  Hb 6.5 on 4/4/2020 s/p transfusion x 1 prbc  Stable with appropriate response Hb.   No active  bleeding  Type/cross  Iron studies consistent with Iron deficiency and ACD        Essential hypertension  Stable, not on meds       Type 2 diabetes mellitus, uncontrolled  - HgbA1c 11%  - sugars hogh in 200-300  - increase levemir to 25 units pm, 22 units am,  8U aspart TID   - LDSII  - BG goal 140-180      VTE Risk Mitigation (From admission, onward)         Ordered     enoxaparin injection 40 mg  Daily      04/04/20 0800     IP VTE HIGH RISK PATIENT  Once      03/27/20 1511                      Beatriz Gerber MD  Department of Hospital Medicine   [facility]

## 2020-04-18 NOTE — PROGRESS NOTES
04/17/20 2322   Vital Signs   Temp 97.8 °F (36.6 °C)   Temp src Oral   Pulse 81   Heart Rate Source Monitor   Resp 16   SpO2 97 %   /84   MAP (mmHg) 92     Pt arrived to unit via EMS. NAD noted, VSS, ambulatory, and on RA. Pt provided nighttime snack. Personal belongings at bedside. Will continue to monitor.

## 2020-04-18 NOTE — PLAN OF CARE
Patient stable. VSS. BS monitored and treated with insulin. Pain treated with medications. Rounding completed. Denied needs. Call bell in reach. Side rails up X2. Bed locked, lowered. Safety maintained.       Vanc trough needed. No blood return.

## 2020-04-18 NOTE — PLAN OF CARE
54-year-old male with history of DM, hepatitis C, HLD, IVDU, recent admission for MRSA / E coli pneumonia c/b MRSA bacteremia, presented 3/26/2020 with right chest pain.      Admission CT chest 3/26/2020 with dense consolidative changes in RLL with interval cavitation, multifocal opacities and cavitation in the lungs, overall improved compared to 3/4/2020.  On admission, patient with persistent fevers and worsening SOB, follow-up CT chest 3/29 with complex pleural fluid collection, chest tube placed 3/29/2020 - pleural fluid cultures with lactobacillus  Sputum cultures with Klebsiella and MRSA.  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

## 2020-04-18 NOTE — ASSESSMENT & PLAN NOTE
RESOLVED as patient is stable on RA.      - Recent Blood cultures remain negative, WBC count normal.    - Continue TB rule out, pending AFB samples. No acid fast bacilli on gram stain.   - Follow up with ID: continue broad spectrum therapy, on vanc and zosyn. STOP ceftriaxone on 4/3, per ID, lactobacillus in pleural fluid can be resistant, also with E Coli/Klebsiella, zosyn stopped per id recs on 4/18/20 as adequate treatment for klebsiella pna and lactobacilus empyema  -continue vancomycin(end date 05/09/2020). Repeat imaging in 4 weeks and may require doxycyline following re imaging if worsening.   · Repeat CT chest 5/8/2020  · If persistent cavitary lesions in CT chest at end of 6 week IV vancomycin course, may need to transition to oral doxycycline 100 mg PO BID until resolution of cavitary lesions  - If patient leaves AMA, can offer linezolid, but would be suboptimal treatment.   - CTS consulted, appreciate assistance: unlikely to perform intervention as he has had good output and decrease on O2 requirements. TTE negative.  - TPA per pulmonology, received 5/5 doses with good output in chest tube (placed 03/30) and improvement in respiratory status. CT pulled.

## 2020-04-18 NOTE — NURSING
No blood return noted for lab draw. Will; pass on to night nurse for one more attempt before calling lab.

## 2020-04-18 NOTE — ASSESSMENT & PLAN NOTE
- HgbA1c 11%  - sugars hogh in 200-300  - increase levemir to 25 units pm, 22 units am,  8U aspart TID   - LDSII  - BG goal 140-180

## 2020-04-18 NOTE — SUBJECTIVE & OBJECTIVE
Interval History: c/o pain in left hip down to leg, says shooting pain,catches him in certain position, has h/o problem with left leg for a couple of months, c/o neuropathy in feet, able to walk without issues.    Review of Systems   Constitutional: Negative for chills and fever.   HENT: Negative for sinus pain and trouble swallowing.    Respiratory: Positive for cough. Negative for shortness of breath.    Cardiovascular: Negative for chest pain and leg swelling.   Gastrointestinal: Negative for abdominal pain, blood in stool, constipation, diarrhea, nausea and vomiting.   Genitourinary: Negative for dysuria and hematuria.   Musculoskeletal: Positive for arthralgias and myalgias. Negative for gait problem.   Skin: Negative for rash.   Neurological: Negative for numbness and headaches.   Psychiatric/Behavioral: Negative for confusion.     Objective:     Vital Signs (Most Recent):  Temp: 98.9 °F (37.2 °C) (04/18/20 1539)  Pulse: 92 (04/18/20 1539)  Resp: 18 (04/18/20 1539)  BP: 109/62 (04/18/20 1539)  SpO2: 95 % (04/18/20 1539) Vital Signs (24h Range):  Temp:  [97.8 °F (36.6 °C)-98.9 °F (37.2 °C)] 98.9 °F (37.2 °C)  Pulse:  [78-92] 92  Resp:  [16-18] 18  SpO2:  [95 %-100 %] 95 %  BP: (109-120)/(57-84) 109/62     Weight: 58.7 kg (129 lb 6.6 oz)  Body mass index is 19.68 kg/m².  No intake or output data in the 24 hours ending 04/18/20 1552   Physical Exam   Constitutional: He is oriented to person, place, and time. No distress.   HENT:   Head: Normocephalic and atraumatic.   Eyes: Pupils are equal, round, and reactive to light. EOM are normal.   Neck: Normal range of motion. Neck supple.   Cardiovascular: Normal rate, regular rhythm and normal heart sounds.   Pulmonary/Chest: Effort normal. No respiratory distress.   Bilateral crackles in back   Abdominal: Soft. Bowel sounds are normal. He exhibits no distension. There is no tenderness.   Musculoskeletal: Normal range of motion. He exhibits no edema.   Neurological: He  is alert and oriented to person, place, and time. No cranial nerve deficit.   Decreased sensation in bilateral feet, full rom of left leg, no back tenderness   Skin: Skin is warm.   Psychiatric: He has a normal mood and affect.   Vitals reviewed.      Significant Labs:   CBC:   Recent Labs   Lab 04/17/20  0528   WBC 5.83   HGB 9.1*   HCT 31.0*        CMP:   Recent Labs   Lab 04/17/20 0528      K 4.3      CO2 25   *   BUN 29*   CREATININE 1.4   CALCIUM 8.4*   PROT 7.9   ALBUMIN 2.5*   BILITOT 0.3   ALKPHOS 77   AST 47*   ALT 69*   ANIONGAP 7*   EGFRNONAA 57.0*       Significant Imaging: I have reviewed all pertinent imaging results/findings within the past 24 hours.

## 2020-04-18 NOTE — NURSING TRANSFER
Nursing Transfer Note      4/17/2020     Transfer To: [facility]    Transfer via ambulance services    Transfer with personal belongings    Transported by [id number] sent: N/A    Chart send with patient: Yes    Notified: Patient refused     Patient reassessed on arrival to facility

## 2020-04-18 NOTE — PLAN OF CARE
Mr. Cali Mabry is a 54 y.o. male, with PMH of polysubstance IVDU, endocarditis, HCV, A. Fib w/ RVR, uncontrolled DM-2, CHF, chronic pancreatitis, dysphagia, MRSA infection, presented to [facility] in transfer from [facility] due to current Covid-19 pandemic for bed capacity, where he was being evaluated for chest pain and SOB. A CT showed multifocal apical cavitations and opacities associated with air/fluid suspected due to MRSA pneumonia and parapneumonic empyema. Possible septic embolization. He was covid negative. A chest tube was placed on 3/30 and tPA x 6 doses was given (2/6 doses on 3/30 and 3/29). CT surgery recommended no intervention at that time. A TTE negative. Chest tube was removed. The patient was treated with vancomycin for MRSA empyema (which he will remain on until 5/9/20), and zosyn for pleural fluid with lactobacillus, which is now complete, and has been discontinued Given his IVDU status, case management was unable to find a company for home infusion. Further ID recs tomorrow per Dr. Pereira. The patient is admitted to [facility] to inpatient status.     Per :   - If able to discharge, can transition from zosyn to augmentin with same end date as above.   - If patient cavitary lesions not resolved after 5/10, he made need prolonged course of doxycycline

## 2020-04-19 PROBLEM — J96.01 ACUTE HYPOXEMIC RESPIRATORY FAILURE: Status: RESOLVED | Noted: 2020-04-19 | Resolved: 2020-04-19

## 2020-04-19 PROBLEM — J15.9 BACTERIAL PNEUMONIA: Status: ACTIVE | Noted: 2020-02-29

## 2020-04-19 PROBLEM — J96.01 ACUTE HYPOXEMIC RESPIRATORY FAILURE: Status: ACTIVE | Noted: 2020-04-19

## 2020-04-19 LAB
ALBUMIN SERPL BCP-MCNC: 2.7 G/DL (ref 3.5–5.2)
ALP SERPL-CCNC: 74 U/L (ref 55–135)
ALT SERPL W/O P-5'-P-CCNC: 79 U/L (ref 10–44)
ANION GAP SERPL CALC-SCNC: 8 MMOL/L (ref 8–16)
AST SERPL-CCNC: 57 U/L (ref 10–40)
BASOPHILS # BLD AUTO: 0.01 K/UL (ref 0–0.2)
BASOPHILS NFR BLD: 0.2 % (ref 0–1.9)
BILIRUB SERPL-MCNC: 0.2 MG/DL (ref 0.1–1)
BUN SERPL-MCNC: 26 MG/DL (ref 6–20)
CALCIUM SERPL-MCNC: 8.9 MG/DL (ref 8.7–10.5)
CHLORIDE SERPL-SCNC: 105 MMOL/L (ref 95–110)
CO2 SERPL-SCNC: 25 MMOL/L (ref 23–29)
CREAT SERPL-MCNC: 1.1 MG/DL (ref 0.5–1.4)
DIFFERENTIAL METHOD: ABNORMAL
EOSINOPHIL # BLD AUTO: 0.1 K/UL (ref 0–0.5)
EOSINOPHIL NFR BLD: 1.3 % (ref 0–8)
ERYTHROCYTE [DISTWIDTH] IN BLOOD BY AUTOMATED COUNT: 17.8 % (ref 11.5–14.5)
EST. GFR  (AFRICAN AMERICAN): >60 ML/MIN/1.73 M^2
EST. GFR  (NON AFRICAN AMERICAN): >60 ML/MIN/1.73 M^2
GLUCOSE SERPL-MCNC: 141 MG/DL (ref 70–110)
HCT VFR BLD AUTO: 29.4 % (ref 40–54)
HGB BLD-MCNC: 8.9 G/DL (ref 14–18)
IMM GRANULOCYTES # BLD AUTO: 0.03 K/UL (ref 0–0.04)
IMM GRANULOCYTES NFR BLD AUTO: 0.5 % (ref 0–0.5)
LYMPHOCYTES # BLD AUTO: 1.9 K/UL (ref 1–4.8)
LYMPHOCYTES NFR BLD: 30 % (ref 18–48)
MCH RBC QN AUTO: 25.9 PG (ref 27–31)
MCHC RBC AUTO-ENTMCNC: 30.3 G/DL (ref 32–36)
MCV RBC AUTO: 86 FL (ref 82–98)
MONOCYTES # BLD AUTO: 0.6 K/UL (ref 0.3–1)
MONOCYTES NFR BLD: 9.6 % (ref 4–15)
NEUTROPHILS # BLD AUTO: 3.6 K/UL (ref 1.8–7.7)
NEUTROPHILS NFR BLD: 58.4 % (ref 38–73)
NRBC BLD-RTO: 0 /100 WBC
PLATELET # BLD AUTO: 252 K/UL (ref 150–350)
PMV BLD AUTO: 9.6 FL (ref 9.2–12.9)
POCT GLUCOSE: 136 MG/DL (ref 70–110)
POCT GLUCOSE: 208 MG/DL (ref 70–110)
POCT GLUCOSE: 232 MG/DL (ref 70–110)
POCT GLUCOSE: 263 MG/DL (ref 70–110)
POTASSIUM SERPL-SCNC: 3.9 MMOL/L (ref 3.5–5.1)
PROT SERPL-MCNC: 7.6 G/DL (ref 6–8.4)
RBC # BLD AUTO: 3.44 M/UL (ref 4.6–6.2)
SODIUM SERPL-SCNC: 138 MMOL/L (ref 136–145)
WBC # BLD AUTO: 6.23 K/UL (ref 3.9–12.7)

## 2020-04-19 PROCEDURE — 36415 COLL VENOUS BLD VENIPUNCTURE: CPT

## 2020-04-19 PROCEDURE — 63600175 PHARM REV CODE 636 W HCPCS: Performed by: STUDENT IN AN ORGANIZED HEALTH CARE EDUCATION/TRAINING PROGRAM

## 2020-04-19 PROCEDURE — 25000003 PHARM REV CODE 250: Performed by: PHYSICIAN ASSISTANT

## 2020-04-19 PROCEDURE — 25000003 PHARM REV CODE 250: Performed by: INTERNAL MEDICINE

## 2020-04-19 PROCEDURE — 99233 SBSQ HOSP IP/OBS HIGH 50: CPT | Mod: ,,, | Performed by: INTERNAL MEDICINE

## 2020-04-19 PROCEDURE — 85025 COMPLETE CBC W/AUTO DIFF WBC: CPT

## 2020-04-19 PROCEDURE — 94761 N-INVAS EAR/PLS OXIMETRY MLT: CPT

## 2020-04-19 PROCEDURE — 25000003 PHARM REV CODE 250: Performed by: STUDENT IN AN ORGANIZED HEALTH CARE EDUCATION/TRAINING PROGRAM

## 2020-04-19 PROCEDURE — 80053 COMPREHEN METABOLIC PANEL: CPT

## 2020-04-19 PROCEDURE — 99900035 HC TECH TIME PER 15 MIN (STAT)

## 2020-04-19 PROCEDURE — 99233 PR SUBSEQUENT HOSPITAL CARE,LEVL III: ICD-10-PCS | Mod: ,,, | Performed by: INTERNAL MEDICINE

## 2020-04-19 PROCEDURE — 11000001 HC ACUTE MED/SURG PRIVATE ROOM

## 2020-04-19 RX ORDER — LIDOCAINE 50 MG/G
1 PATCH TOPICAL
Status: DISCONTINUED | OUTPATIENT
Start: 2020-04-19 | End: 2020-04-22 | Stop reason: HOSPADM

## 2020-04-19 RX ADMIN — LIDOCAINE 1 PATCH: 50 PATCH CUTANEOUS at 11:04

## 2020-04-19 RX ADMIN — VANCOMYCIN HYDROCHLORIDE 1500 MG: 1.5 INJECTION, POWDER, LYOPHILIZED, FOR SOLUTION INTRAVENOUS at 08:04

## 2020-04-19 RX ADMIN — OXYCODONE AND ACETAMINOPHEN 1 TABLET: 10; 325 TABLET ORAL at 06:04

## 2020-04-19 RX ADMIN — INSULIN ASPART 3 UNITS: 100 INJECTION, SOLUTION INTRAVENOUS; SUBCUTANEOUS at 04:04

## 2020-04-19 RX ADMIN — Medication 6 MG: at 09:04

## 2020-04-19 RX ADMIN — LIDOCAINE 1 PATCH: 50 PATCH CUTANEOUS at 08:04

## 2020-04-19 RX ADMIN — OXYCODONE AND ACETAMINOPHEN 1 TABLET: 10; 325 TABLET ORAL at 02:04

## 2020-04-19 RX ADMIN — GABAPENTIN 300 MG: 300 CAPSULE ORAL at 02:04

## 2020-04-19 RX ADMIN — Medication 1 TABLET: at 08:04

## 2020-04-19 RX ADMIN — HYDROXYZINE HYDROCHLORIDE 25 MG: 25 TABLET, FILM COATED ORAL at 08:04

## 2020-04-19 RX ADMIN — HYDROXYZINE HYDROCHLORIDE 25 MG: 25 TABLET, FILM COATED ORAL at 09:04

## 2020-04-19 RX ADMIN — ATORVASTATIN CALCIUM 20 MG: 20 TABLET, FILM COATED ORAL at 08:04

## 2020-04-19 RX ADMIN — THERA TABS 1 TABLET: TAB at 08:04

## 2020-04-19 RX ADMIN — INSULIN ASPART 8 UNITS: 100 INJECTION, SOLUTION INTRAVENOUS; SUBCUTANEOUS at 04:04

## 2020-04-19 RX ADMIN — INSULIN ASPART 2 UNITS: 100 INJECTION, SOLUTION INTRAVENOUS; SUBCUTANEOUS at 11:04

## 2020-04-19 RX ADMIN — INSULIN ASPART 8 UNITS: 100 INJECTION, SOLUTION INTRAVENOUS; SUBCUTANEOUS at 07:04

## 2020-04-19 RX ADMIN — HYDROXYZINE HYDROCHLORIDE 25 MG: 25 TABLET, FILM COATED ORAL at 05:04

## 2020-04-19 RX ADMIN — GABAPENTIN 300 MG: 300 CAPSULE ORAL at 08:04

## 2020-04-19 RX ADMIN — INSULIN ASPART 8 UNITS: 100 INJECTION, SOLUTION INTRAVENOUS; SUBCUTANEOUS at 11:04

## 2020-04-19 NOTE — PLAN OF CARE
Pt up ad checo ambulating in room. Voiding continent. Medicated for pain PRN c good relief. Left arm PICC site patent c dressing dry and intact. Vs stable. Accu checks monitored c stable vs and afebrile. Has been cooperative today. Seen by Dr Gerber today.Safety precautions maintained. Purposeful hourly rounding done.

## 2020-04-19 NOTE — SUBJECTIVE & OBJECTIVE
Interval History: c/o pain in left hip down to leg, says shooting pain,catches him in certain position, has h/o problem with left leg for a couple of months, c/o neuropathy in feet, able to walk without issues, improved neuropathy in feet and left leg after neurontin, c/o neck  pain today    Review of Systems   Constitutional: Negative for chills and fever.   HENT: Negative for sinus pain and trouble swallowing.    Respiratory: Positive for cough. Negative for shortness of breath.    Cardiovascular: Negative for chest pain and leg swelling.   Gastrointestinal: Negative for abdominal pain, blood in stool, constipation, diarrhea, nausea and vomiting.   Genitourinary: Negative for dysuria and hematuria.   Musculoskeletal: Positive for arthralgias and myalgias. Negative for gait problem.   Skin: Negative for rash.   Neurological: Negative for numbness and headaches.   Psychiatric/Behavioral: Negative for confusion.     Objective:     Vital Signs (Most Recent):  Temp: 98.7 °F (37.1 °C) (04/19/20 1547)  Pulse: 83 (04/19/20 1547)  Resp: 18 (04/19/20 1547)  BP: 106/60 (04/19/20 1547)  SpO2: 98 % (04/19/20 1547) Vital Signs (24h Range):  Temp:  [97.8 °F (36.6 °C)-98.7 °F (37.1 °C)] 98.7 °F (37.1 °C)  Pulse:  [82-86] 83  Resp:  [18-20] 18  SpO2:  [96 %-98 %] 98 %  BP: ()/(55-67) 106/60     Weight: 58.7 kg (129 lb 6.6 oz)  Body mass index is 19.68 kg/m².    Intake/Output Summary (Last 24 hours) at 4/19/2020 1611  Last data filed at 4/19/2020 0200  Gross per 24 hour   Intake 1916 ml   Output --   Net 1916 ml      Physical Exam   Constitutional: He is oriented to person, place, and time. No distress.   HENT:   Head: Normocephalic and atraumatic.   Eyes: Pupils are equal, round, and reactive to light. EOM are normal.   Neck: Normal range of motion. Neck supple.   Cardiovascular: Normal rate, regular rhythm and normal heart sounds.   Pulmonary/Chest: Effort normal. No respiratory distress.   Bilateral crackles in back    Abdominal: Soft. Bowel sounds are normal. He exhibits no distension. There is no tenderness.   Musculoskeletal: Normal range of motion. He exhibits no edema.   Neurological: He is alert and oriented to person, place, and time. No cranial nerve deficit.   Decreased sensation in bilateral feet, full rom of left leg, no back tenderness   Skin: Skin is warm.   Psychiatric: He has a normal mood and affect.   Vitals reviewed.      Significant Labs:   CBC:   Recent Labs   Lab 04/19/20  0452   WBC 6.23   HGB 8.9*   HCT 29.4*        CMP:   Recent Labs   Lab 04/19/20  0452      K 3.9      CO2 25   *   BUN 26*   CREATININE 1.1   CALCIUM 8.9   PROT 7.6   ALBUMIN 2.7*   BILITOT 0.2   ALKPHOS 74   AST 57*   ALT 79*   ANIONGAP 8   EGFRNONAA >60       Significant Imaging: I have reviewed all pertinent imaging results/findings within the past 24 hours.

## 2020-04-19 NOTE — PROGRESS NOTES
Pharmacokinetic Assessment Follow Up: IV Vancomycin    Vancomycin serum concentration assessment(s):    The trough level was drawn correctly and can be used to guide therapy at this time. The measurement is within the desired definitive target range of 10 to 20 mcg/mL.    Vancomycin Regimen Plan:    Continue regimen to Vancomycin 1500 mg IV every 24 hours with next serum trough concentration measured at 1930 prior to 3rd dose on 4/20/20     Drug levels (last 3 results):  Recent Labs   Lab Result Units 04/18/20  1933   Vancomycin-Trough ug/mL 15.5       Pharmacy will continue to follow and monitor vancomycin.    Please contact pharmacy at extension [phone] for questions regarding this assessment.    Thank you for the consult,   Robert Lynch       Patient brief summary:  Cali Mabry is a 54 y.o. male initiated on antimicrobial therapy with IV Vancomycin for treatment of bacteremia    The patient's current regimen is 1500 mg q 24h    Drug Allergies:   Review of patient's allergies indicates:  No Known Allergies    Actual Body Weight:   58.7 kg    Renal Function:   Estimated Creatinine Clearance: 50.7 mL/min (based on SCr of 1.4 mg/dL).,     Dialysis Method (if applicable):  N/A    CBC (last 72 hours):  Recent Labs   Lab Result Units 04/17/20  0528   WBC K/uL 5.83   Hemoglobin g/dL 9.1*   Hematocrit % 31.0*   Platelets K/uL 299   Gran% % 62.7   Lymph% % 26.1   Mono% % 9.8   Eosinophil% % 0.7   Basophil% % 0.2   Differential Method  Automated       Metabolic Panel (last 72 hours):  Recent Labs   Lab Result Units 04/16/20  0955 04/17/20  0528   Sodium mmol/L 136 136   Potassium mmol/L 5.0 4.3   Chloride mmol/L 104 104   CO2 mmol/L 23 25   Glucose mg/dL 240* 349*   BUN, Bld mg/dL 29* 29*   Creatinine mg/dL 1.3 1.4   Albumin g/dL  --  2.5*   Total Bilirubin mg/dL  --  0.3   Alkaline Phosphatase U/L  --  77   AST U/L  --  47*   ALT U/L  --  69*       Vancomycin Administrations:  vancomycin given in the last 96  hours                     vancomycin 1.5 g in dextrose 5 % 250 mL IVPB (ready to mix) (mg) 1,500 mg New Bag 04/17/20 2009     1,500 mg New Bag 04/16/20 1939                      Microbiologic Results:  Microbiology Results (last 7 days)       Procedure Component Value Units Date/Time    Fungus culture [[id number]] Collected:  03/29/20 2137    Order Status:  Completed Specimen:  Body Fluid from Pleural Fluid Updated:  04/14/20 1126     Fungus (Mycology) Culture Culture in progress      No fungus isolated after 2 weeks    Susceptibility, Aerobic Bacteria  RYAN [[id number]]  (Abnormal) Collected:  04/09/20 1206    Order Status:  Completed Updated:  04/12/20 1039     Specimen Source and Organism Name pleural fluid and Lactobacillus rahamnos     Susceptibility, Aerobic Bacteria FINAL 04/12/2020 1036     Comment: SOURCE: PLEURAL FLUID, pleural fluid and Lactobacillus   rahamnos  SUSCEPTIBILITY, AEROBIC, RYAN                           FINAL  LACTOBACILLUS RHAMNOSUS    Organism identified by client.  ----------------------------------------------------------  Organism     LACTOBACILLUS RHAMNOSUS  Antibiotic  RYAN (mcg/mL)  Interpretation  ----------------------------------------------------------  Penicillin            1       S  Meropenem             8       R  ------------------------------------------------------------  S=SUSCEPTIBLE  I=INTERMEDIATE  R=RESISTANT  NS=NONSUSCEPTIBLE  SDD=SUSCEPTIBLE DOSE DEPENDENT  ------------------------------------------------------------  Test Performed by:  [address] [id number]  : Doroteo Lynn M.D. Ph.D.; [facility]# [id number]

## 2020-04-19 NOTE — PLAN OF CARE
Pt AAOx4. Afebrile and VSS throughout shift. Poc reviewed with pt and questions answered. No respiratory distress noted this shift; sat's 100% on RA. BG monitoring maintained. Repositions self independently. Pt is eating and tolerating diet well. Voiding independently up to toilet. Melatonin given for c/o insomnia this shift. Pain is managed with prn p.o medication. Remains free from falls, injury, and skin breakdown. All needs and concerns met. Purposeful rounding done. Bed is locked and in lowest position, side rails up x2, call light in reach. Will continue to monitor.

## 2020-04-19 NOTE — ASSESSMENT & PLAN NOTE
- HgbA1c 11%  - sugars better   -continue  levemir  25 units pm, 22 units am,  8U aspart TID   - LDSII  - BG goal 140-180

## 2020-04-19 NOTE — ASSESSMENT & PLAN NOTE
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    -  No acid fast bacilli on gram stain, cx pending   -  continue broad spectrum therapy, on vanc . Stopped ceftriaxone on 4/3, per ID, lactobacillus in pleural fluid can be resistant, also with E Coli/Klebsiella in resp cx. zosyn stopped per id recs on 4/18/20 as adequate treatment for klebsiella pna and lactobacilus empyema  -continue vancomycin(end date 05/09/2020).   · Repeat CT chest 5/8/2020  · If persistent cavitary lesions in CT chest at end of 6 week IV vancomycin course, may need to transition to oral doxycycline 100 mg PO BID until resolution of cavitary lesions  - If patient leaves AMA, can offer linezolid, but would be suboptimal treatment.   - CTS consulted, appreciate assistance: unlikely to perform intervention as he has had good output and decrease on O2 requirements. TTE negative.  - TPA per pulmonology, received 5/5 doses with good output in chest tube (placed 03/30) and improvement in respiratory status. CT pulled.

## 2020-04-19 NOTE — PROGRESS NOTES
"[facility] Hospital Medicine  Progress Note    Patient Name: Cali Mabry  MRN: [id number]  Patient Class: IP- Inpatient   Admission Date: 3/26/2020  Length of Stay: 23 days  Attending Physician: Beatriz Gerber MD  Primary Care Provider: Primary Doctor No        Subjective:     Principal Problem:Bacterial pneumonia        HPI:  Cali Mabry is a 54 y.o. with DM II, hepatitis C, HTN, polysubstance abuse/IVDU, pancreatitis who presents, [place] [facility] with a 2 day history of chest pain and shortness of breath.  Chest pain is centralized and just described as "painful." Pain aggravated with movement and deep breathing. He reports an intermittent cough that is non-productive. He denies sick contacts, fever, chills, sore throat. Upon questioning, patient stated "I have the virus so just treat me for it." Educated patient that testing is still in process. Inquired about IV drug use however he was unable to recall when he last used. Patient swatted examiner's hand after chest palpation, yelling "that hurts!" Patient became aggravated, reported his pain was now worse and requested pain medication. Per nurse, patient given PRN dose an hour prior. Patient encouraged to continue discussing his plan of care however stated, "I don't want to chat."   ED: Afebrile with leukocytosis. CTA chest from 3/26 with dense consolidative changes within the right lower lobe with internal cavitation containing air and fluid; Occlusion is seen of the right lower lobe bronchi which may be secondary to mucoid impaction or aspirated material; Additional multifocal opacities and cavitations.  Vancomycin and Zosyn administered. On 2L NC. VSS. Admitted to hospital medicine for further evaluation and COVID-19 rule out.        Overview/Hospital Course:  53 yo male with IVDU with Hep C, hx of MRSA (V sens), Pancreatitis, DM2, who presents as a transfer [place] [facility] [place] SOB and CP, CT + multifocal apical " "cavitations and opacities, assoc. w/ air +fluid, suspected due to MRSA pneumonia c/b parapneumonic empyema and possible septic embolization. covid -, s/d 3/28. Chest tube placed 3/30 qhs on TPA x 6 doses (2/6 doses on 3/30 and 3/29). CTS no intervention for now. TTE negative. Chest tube was pulled. ID's final recommendations included: plans todischarge with IV vanc + cefpedoxime or augmentin for 4-6 weeks total (end date 05/09/2020). Repeat imaging in 4 weeks and may require doxycyline following re imaging if worsening. If patient leaves AMA, can offer linezolid, but that would be suboptimal treatment.     Throughout hospital stay, patient's insulin was titrated to maintain -180.    04/14/2020 NAEON. He reports feeling well. Has some mild left hip soreness that he describes similar to "cheyenne horse". Otherwise tolerating diet. Getting out of bed.     04/15/2020 NAEON. He is eating drinking well. Ambulating.     04/16/2020 NAEON. Doing well. Likes diet coke. Ambulating.     04/17/2020 NAEON. Reporting some ongoing left hip discomfort for last several days. He is able to bear weight and ambulate. No swelling or tenderness. No fevers. Eating and tolerating diet.     Interval History: c/o pain in left hip down to leg, says shooting pain,catches him in certain position, has h/o problem with left leg for a couple of months, c/o neuropathy in feet, able to walk without issues, improved neuropathy in feet and left leg after neurontin, c/o neck  pain today    Review of Systems   Constitutional: Negative for chills and fever.   HENT: Negative for sinus pain and trouble swallowing.    Respiratory: Positive for cough. Negative for shortness of breath.    Cardiovascular: Negative for chest pain and leg swelling.   Gastrointestinal: Negative for abdominal pain, blood in stool, constipation, diarrhea, nausea and vomiting.   Genitourinary: Negative for dysuria and hematuria.   Musculoskeletal: Positive for arthralgias and " myalgias. Negative for gait problem.   Skin: Negative for rash.   Neurological: Negative for numbness and headaches.   Psychiatric/Behavioral: Negative for confusion.     Objective:     Vital Signs (Most Recent):  Temp: 98.7 °F (37.1 °C) (04/19/20 1547)  Pulse: 83 (04/19/20 1547)  Resp: 18 (04/19/20 1547)  BP: 106/60 (04/19/20 1547)  SpO2: 98 % (04/19/20 1547) Vital Signs (24h Range):  Temp:  [97.8 °F (36.6 °C)-98.7 °F (37.1 °C)] 98.7 °F (37.1 °C)  Pulse:  [82-86] 83  Resp:  [18-20] 18  SpO2:  [96 %-98 %] 98 %  BP: ()/(55-67) 106/60     Weight: 58.7 kg (129 lb 6.6 oz)  Body mass index is 19.68 kg/m².    Intake/Output Summary (Last 24 hours) at 4/19/2020 1611  Last data filed at 4/19/2020 0200  Gross per 24 hour   Intake 1916 ml   Output --   Net 1916 ml      Physical Exam   Constitutional: He is oriented to person, place, and time. No distress.   HENT:   Head: Normocephalic and atraumatic.   Eyes: Pupils are equal, round, and reactive to light. EOM are normal.   Neck: Normal range of motion. Neck supple.   Cardiovascular: Normal rate, regular rhythm and normal heart sounds.   Pulmonary/Chest: Effort normal. No respiratory distress.   Bilateral crackles in back   Abdominal: Soft. Bowel sounds are normal. He exhibits no distension. There is no tenderness.   Musculoskeletal: Normal range of motion. He exhibits no edema.   Neurological: He is alert and oriented to person, place, and time. No cranial nerve deficit.   Decreased sensation in bilateral feet, full rom of left leg, no back tenderness   Skin: Skin is warm.   Psychiatric: He has a normal mood and affect.   Vitals reviewed.      Significant Labs:   CBC:   Recent Labs   Lab 04/19/20 0452   WBC 6.23   HGB 8.9*   HCT 29.4*        CMP:   Recent Labs   Lab 04/19/20 0452      K 3.9      CO2 25   *   BUN 26*   CREATININE 1.1   CALCIUM 8.9   PROT 7.6   ALBUMIN 2.7*   BILITOT 0.2   ALKPHOS 74   AST 57*   ALT 79*   ANIONGAP 8   EGFRNONAA  >60       Significant Imaging: I have reviewed all pertinent imaging results/findings within the past 24 hours.      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    -  No acid fast bacilli on gram stain, cx pending   -  continue broad spectrum therapy, on vanc . Stopped ceftriaxone on 4/3, per ID, lactobacillus in pleural fluid can be resistant, also with E Coli/Klebsiella in resp cx. zosyn stopped per id recs on 4/18/20 as adequate treatment for klebsiella pna and lactobacilus empyema  -continue vancomycin(end date 05/09/2020).   · Repeat CT chest 5/8/2020  · If persistent cavitary lesions in CT chest at end of 6 week IV vancomycin course, may need to transition to oral doxycycline 100 mg PO BID until resolution of cavitary lesions  - If patient leaves AMA, can offer linezolid, but would be suboptimal treatment.   - CTS consulted, appreciate assistance: unlikely to perform intervention as he has had good output and decrease on O2 requirements. TTE negative.  - TPA per pulmonology, received 5/5 doses with good output in chest tube (placed 03/30) and improvement in respiratory status. CT pulled.    Pain of left lower extremity  Likely radiculopathy which he says has had issues before, also c/o neuropathy  Continue  neurontin 300 mg tid   Continue lidoderm, patch    Iron deficiency anemia  S/p transfusion, monitor      Parapneumonic effusion  See above       Septic embolism  TTE negative but concern for endocarditis  Continue antibiotics      RAMYA (acute kidney injury)  Improved with fluids,  Continue to trend.        Opioid abuse  Cautious pain control. Continue Percocet 10mg Q6H PRN.      Tobacco abuse  Advise to quit      Chronic hepatitis C        Intravenous drug abuse    -counseled on  cessation      Hyperlipidemia  - continue statin       Normocytic normochromic anemia  Hb 6.5 on 4/4/2020 s/p transfusion x 1 prbc  Stable with appropriate response Hb.   No active bleeding  Type/cross  Iron studies consistent with Iron deficiency and ACD        Essential hypertension  Stable, not on meds       Type 2 diabetes mellitus, uncontrolled  - HgbA1c 11%  - sugars better   -continue  levemir  25 units pm, 22 units am,  8U aspart TID   - LDSII  - BG goal 140-180      VTE Risk Mitigation (From admission, onward)         Ordered     enoxaparin injection 40 mg  Daily      04/04/20 0800     IP VTE HIGH RISK PATIENT  Once      03/27/20 1511                      Beatriz Gerber MD  Department of Hospital Medicine   [facility]

## 2020-04-19 NOTE — ASSESSMENT & PLAN NOTE
Likely radiculopathy which he says has had issues before, also c/o neuropathy  Continue  neurontin 300 mg tid   Continue lidoderm, patch

## 2020-04-20 PROBLEM — D50.9 IRON DEFICIENCY ANEMIA: Chronic | Status: RESOLVED | Noted: 2020-04-04 | Resolved: 2020-04-20

## 2020-04-20 PROBLEM — R74.01 TRANSAMINITIS: Status: ACTIVE | Noted: 2020-04-20

## 2020-04-20 PROBLEM — F41.9 ANXIETY: Status: ACTIVE | Noted: 2020-04-20

## 2020-04-20 PROBLEM — J43.8 OTHER EMPHYSEMA: Status: ACTIVE | Noted: 2020-04-20

## 2020-04-20 PROBLEM — N17.9 AKI (ACUTE KIDNEY INJURY): Status: RESOLVED | Noted: 2020-02-22 | Resolved: 2020-04-20

## 2020-04-20 LAB
POCT GLUCOSE: 159 MG/DL (ref 70–110)
POCT GLUCOSE: 229 MG/DL (ref 70–110)
POCT GLUCOSE: 233 MG/DL (ref 70–110)
POCT GLUCOSE: 279 MG/DL (ref 70–110)
VANCOMYCIN TROUGH SERPL-MCNC: 11.8 UG/ML (ref 10–22)

## 2020-04-20 PROCEDURE — 25000003 PHARM REV CODE 250: Performed by: PHYSICIAN ASSISTANT

## 2020-04-20 PROCEDURE — 80202 ASSAY OF VANCOMYCIN: CPT

## 2020-04-20 PROCEDURE — 25000003 PHARM REV CODE 250: Performed by: INTERNAL MEDICINE

## 2020-04-20 PROCEDURE — 36415 COLL VENOUS BLD VENIPUNCTURE: CPT

## 2020-04-20 PROCEDURE — 63600175 PHARM REV CODE 636 W HCPCS: Performed by: STUDENT IN AN ORGANIZED HEALTH CARE EDUCATION/TRAINING PROGRAM

## 2020-04-20 PROCEDURE — 99233 SBSQ HOSP IP/OBS HIGH 50: CPT | Mod: ,,, | Performed by: INTERNAL MEDICINE

## 2020-04-20 PROCEDURE — 99233 PR SUBSEQUENT HOSPITAL CARE,LEVL III: ICD-10-PCS | Mod: ,,, | Performed by: INTERNAL MEDICINE

## 2020-04-20 PROCEDURE — 99900035 HC TECH TIME PER 15 MIN (STAT)

## 2020-04-20 PROCEDURE — 11000001 HC ACUTE MED/SURG PRIVATE ROOM

## 2020-04-20 PROCEDURE — 25000003 PHARM REV CODE 250: Performed by: STUDENT IN AN ORGANIZED HEALTH CARE EDUCATION/TRAINING PROGRAM

## 2020-04-20 PROCEDURE — 94761 N-INVAS EAR/PLS OXIMETRY MLT: CPT

## 2020-04-20 RX ORDER — DULOXETIN HYDROCHLORIDE 30 MG/1
60 CAPSULE, DELAYED RELEASE ORAL DAILY
Status: DISCONTINUED | OUTPATIENT
Start: 2020-04-20 | End: 2020-04-22 | Stop reason: HOSPADM

## 2020-04-20 RX ADMIN — INSULIN ASPART 3 UNITS: 100 INJECTION, SOLUTION INTRAVENOUS; SUBCUTANEOUS at 07:04

## 2020-04-20 RX ADMIN — OXYCODONE AND ACETAMINOPHEN 1 TABLET: 10; 325 TABLET ORAL at 09:04

## 2020-04-20 RX ADMIN — Medication 1 TABLET: at 08:04

## 2020-04-20 RX ADMIN — GABAPENTIN 300 MG: 300 CAPSULE ORAL at 08:04

## 2020-04-20 RX ADMIN — INSULIN ASPART 8 UNITS: 100 INJECTION, SOLUTION INTRAVENOUS; SUBCUTANEOUS at 05:04

## 2020-04-20 RX ADMIN — INSULIN ASPART 2 UNITS: 100 INJECTION, SOLUTION INTRAVENOUS; SUBCUTANEOUS at 05:04

## 2020-04-20 RX ADMIN — INSULIN ASPART 8 UNITS: 100 INJECTION, SOLUTION INTRAVENOUS; SUBCUTANEOUS at 07:04

## 2020-04-20 RX ADMIN — OXYCODONE AND ACETAMINOPHEN 1 TABLET: 10; 325 TABLET ORAL at 07:04

## 2020-04-20 RX ADMIN — GABAPENTIN 300 MG: 300 CAPSULE ORAL at 02:04

## 2020-04-20 RX ADMIN — LIDOCAINE 1 PATCH: 50 PATCH CUTANEOUS at 01:04

## 2020-04-20 RX ADMIN — ATORVASTATIN CALCIUM 20 MG: 20 TABLET, FILM COATED ORAL at 08:04

## 2020-04-20 RX ADMIN — ONDANSETRON 8 MG: 8 TABLET, ORALLY DISINTEGRATING ORAL at 02:04

## 2020-04-20 RX ADMIN — THERA TABS 1 TABLET: TAB at 08:04

## 2020-04-20 RX ADMIN — Medication 6 MG: at 09:04

## 2020-04-20 RX ADMIN — INSULIN ASPART 1 UNITS: 100 INJECTION, SOLUTION INTRAVENOUS; SUBCUTANEOUS at 09:04

## 2020-04-20 RX ADMIN — LIDOCAINE 1 PATCH: 50 PATCH CUTANEOUS at 08:04

## 2020-04-20 RX ADMIN — OXYCODONE AND ACETAMINOPHEN 1 TABLET: 10; 325 TABLET ORAL at 02:04

## 2020-04-20 RX ADMIN — INSULIN ASPART 8 UNITS: 100 INJECTION, SOLUTION INTRAVENOUS; SUBCUTANEOUS at 11:04

## 2020-04-20 RX ADMIN — HYDROXYZINE HYDROCHLORIDE 25 MG: 25 TABLET, FILM COATED ORAL at 11:04

## 2020-04-20 RX ADMIN — DULOXETINE HYDROCHLORIDE 60 MG: 30 CAPSULE, DELAYED RELEASE ORAL at 05:04

## 2020-04-20 RX ADMIN — GABAPENTIN 300 MG: 300 CAPSULE ORAL at 09:04

## 2020-04-20 RX ADMIN — VANCOMYCIN HYDROCHLORIDE 1500 MG: 1.5 INJECTION, POWDER, LYOPHILIZED, FOR SOLUTION INTRAVENOUS at 09:04

## 2020-04-20 NOTE — PROGRESS NOTES
0230: Patient called for pain meds. RN got a set of vitals first since patient refused 0000 vitals. BP 88/53, MAP 65, HR 87. Patient asymptomatic. Pain meds not given and patient yelled/cursed at nurse to get out of his room. Darin CLEMONS notified.    0620: RN tried to get a new set of vitals and was yelled at again to leave patient alone. Patient said he just woke up and he needs some time.       AAOx4. Up independently to bathroom. BG monitored at bedtime (patient refused 1 unit of coverage). Tolerating diet. Resting between care. No distress noted. Will continue to monitor.

## 2020-04-20 NOTE — ASSESSMENT & PLAN NOTE
-high viral load. HIV NR  -counseled on cessation in order to start outpatient treatment for HCV  -will need to follow-up with hepatology Clinic upon discharge

## 2020-04-20 NOTE — PROGRESS NOTES
Pt saturation is normal on RA. No PRN treatments were needed. No changes were made. Will continue to monitor.

## 2020-04-20 NOTE — PLAN OF CARE
Pt is independent at baseline.  No DME or therapy needs expected at time of hospital DC.  PT has signed off.     Opiod user, admitted for endocarditis.  Anticipated IV antibiotics stop date 5/10/20.  Pt current IVDA, not candidate for IV home infusion.     20+ referrals sent today for LTAC and SNF, awaiting accepting facility.      Patient updated, will continue to follow.    Pt was previously seen by Dr. PETER Coffman for Primary care, this clinic has discharged pt.  Insurance has assigned Dr. JACKLYN Zimmer as new PCP.  Followup appt scheduled for Maryan 3 at 1:30 pm.  Information added to AVS.    Ambulatory referral sent for pt to followup with hepatitis clinic at [facility], they will call pt to schedule.     Followup appt with ID, Dr. Pereira ([phone]) requested, they will call to schedule followup for week of May 18.            04/20/20 1026   Discharge Reassessment   Assessment Type Discharge Planning Reassessment   Provided patient/caregiver education on the expected discharge date and the discharge plan Yes   Do you have any problems affording any of your prescribed medications? No   Discharge Plan A Long-term acute care facility (LTAC)   Discharge Plan B Skilled Nursing Facility   DME Needed Upon Discharge  none

## 2020-04-20 NOTE — ASSESSMENT & PLAN NOTE
-TTE negative but concern for endocarditis given multifocal pneumonia  -Continue antibiotics as above

## 2020-04-20 NOTE — PROGRESS NOTES
"[facility] Hospital Medicine  Progress Note    Patient Name: Cali Mabry  MRN: [id number]  Patient Class: IP- Inpatient   Admission Date: 3/26/2020  Length of Stay: 24 days  Attending Physician: Beatriz Gerber MD  Primary Care Provider: Bradley Hillman MD        Subjective:     Principal Problem:Bacterial pneumonia        HPI:  Cali Mabry is a 54 y.o. with DM II, hepatitis C, HTN, polysubstance abuse/IVDU, pancreatitis who [place] [facility] [place] a 2 day history of chest pain and shortness of breath.  Chest pain is centralized and just described as "painful." Pain aggravated with movement and deep breathing. He reports an intermittent cough that is non-productive. He denies sick contacts, fever, chills, sore throat. Upon questioning, patient stated "I have the virus so just treat me for it." Educated patient that testing is still in process. Inquired about IV drug use however he was unable to recall when he last used. Patient swatted examiner's hand after chest palpation, yelling "that hurts!" Patient became aggravated, reported his pain was now worse and requested pain medication. Per nurse, patient given PRN dose an hour prior. Patient encouraged to continue discussing his plan of care however stated, "I don't want to chat."   ED: Afebrile with leukocytosis. CTA chest from 3/26 with dense consolidative changes within the right lower lobe with internal cavitation containing air and fluid; Occlusion is seen of the right lower lobe bronchi which may be secondary to mucoid impaction or aspirated material; Additional multifocal opacities and cavitations.  Vancomycin and Zosyn administered. On 2L NC. VSS. Admitted to hospital medicine for further evaluation and COVID-19 rule out.        Overview/Hospital Course:  55 yo male with IVDU with Hep C, hx of MRSA (V sens), Pancreatitis, DM2, who presents as a transfer [place] [facility] [place] for SOB and CP, CT + multifocal apical " "cavitations and opacities, assoc. w/ air +fluid, suspected due to MRSA pneumonia c/b parapneumonic empyema and possible septic embolization. covid -, s/d 3/28. Chest tube placed 3/30 qhs on TPA x 6 doses (2/6 doses on 3/30 and 3/29). CTS no intervention for now. TTE negative. Chest tube was pulled. ID's final recommendations included: plans to discharge with IV vanc + cefpedoxime or augmentin for 4-6 weeks total (end date 05/10/2020). Repeat imaging in 4 weeks and may require doxycyline following re imaging if worsening. If patient leaves AMA, can offer linezolid, but that would be suboptimal treatment.     Throughout hospital stay, patient's insulin was titrated to maintain -180.    04/14/2020 NAEON. He reports feeling well. Has some mild left hip soreness that he describes similar to "cheyenne horse". Otherwise tolerating diet. Getting out of bed.     04/15/2020 NAEON. He is eating drinking well. Ambulating.     04/16/2020 NAEON. Doing well. Likes diet coke. Ambulating.     04/17/2020 NAEON. Reporting some ongoing left hip discomfort for last several days. He is able to bear weight and ambulate. No swelling or tenderness. No fevers. Eating and tolerating diet.     Interval History: NAEON. AF.  Reports generalized pain all over .  No other complaints, such as CP, SOB, fever, cough.  Is anxious to go home on 05/10 after the completion of his antibiotics no matter what.  Does not like the mattress here due to chronic back pain.    Review of Systems   Constitutional: Negative for activity change and fever.   HENT: Negative for rhinorrhea and sneezing.    Eyes: Negative for discharge and redness.   Respiratory: Negative for shortness of breath and wheezing.    Cardiovascular: Negative for chest pain and leg swelling.   Gastrointestinal: Negative for abdominal pain and vomiting.   Genitourinary: Negative for difficulty urinating and dysuria.   Musculoskeletal: Positive for myalgias. Negative for gait problem. "   Skin: Negative for rash and wound.   Neurological: Negative for dizziness and light-headedness.   Hematological: Negative for adenopathy.   Psychiatric/Behavioral: Negative for confusion. The patient is not nervous/anxious.      Objective:     Vital Signs (Most Recent):  Temp: 96.6 °F (35.9 °C) (04/20/20 0747)  Pulse: 92 (04/20/20 0747)  Resp: 16 (04/20/20 0747)  BP: (!) 112/59 (04/20/20 0747)  SpO2: 99 % (04/20/20 0747) Vital Signs (24h Range):  Temp:  [96.6 °F (35.9 °C)-98.7 °F (37.1 °C)] 96.6 °F (35.9 °C)  Pulse:  [83-92] 92  Resp:  [16-20] 16  SpO2:  [97 %-99 %] 99 %  BP: ()/(53-66) 112/59     Weight: 58.7 kg (129 lb 6.6 oz)  Body mass index is 19.68 kg/m².    Intake/Output Summary (Last 24 hours) at 4/20/2020 1033  Last data filed at 4/20/2020 0229  Gross per 24 hour   Intake 250 ml   Output --   Net 250 ml      Physical Exam   Constitutional: He appears well-developed and well-nourished. No distress.   HENT:   Head: Normocephalic and atraumatic.   Mouth/Throat: Oropharynx is clear and moist.   Eyes: Conjunctivae and EOM are normal. Right eye exhibits no discharge. Left eye exhibits no discharge.   Neck: No tracheal deviation present.   Cardiovascular: Normal rate, regular rhythm and normal heart sounds.   No murmur heard.  Pulmonary/Chest: Effort normal. No stridor. No respiratory distress. He has no wheezes.   Bibasilar crackles, R>L   Abdominal: Soft. Bowel sounds are normal. He exhibits no distension. There is no tenderness.   Musculoskeletal: He exhibits no edema or tenderness.   Neurological: He is alert. No cranial nerve deficit.   Skin: Skin is warm. No rash noted. He is not diaphoretic.   Psychiatric: He has a normal mood and affect. His behavior is normal.       Significant Labs:   Blood Culture: No results for input(s): LABBLOO in the last 48 hours.  CBC:   Recent Labs   Lab 04/19/20  0452   WBC 6.23   HGB 8.9*   HCT 29.4*        CMP:   Recent Labs   Lab 04/19/20  0452      K 3.9       CO2 25   *   BUN 26*   CREATININE 1.1   CALCIUM 8.9   PROT 7.6   ALBUMIN 2.7*   BILITOT 0.2   ALKPHOS 74   AST 57*   ALT 79*   ANIONGAP 8   EGFRNONAA >60       Significant Imaging: No new imaging      Assessment/Plan:      * Bacterial pneumonia  - TTE negative, but patient with likely septic emboli /pneumonia from presumed endocarditis from iv drug use.  CT head without septic emboli  - resp cx positive for mrsa, klebsiella, ecoli  - pleural fluid/empyema s/p chest tube, tPA x 5 with lactobacillus.  Did not require CT surgery.  Completed Zosyn on 04/18  - initial blood culture 2-22 with MRSA.  He has had subsequent negative Blood cultures since 2/23/20  -  No acid fast bacilli on multiple gram stains, cx pending.  QuantiFERON gold intermediate  - plan to repeat CT chest 5/8.  Is CT chest shows continued cavitary lesions, will plan on doxycycline 100 b.i.d. until resolution of lung lesions  - currently on IV vanc for 6 weeks through left PICC.  Anticipated end date 5/10  - If patient leaves AMA, can offer linezolid, but would be suboptimal treatment.   - appreciate ID following.  Has outpatient follow-up with ID    Transaminitis  -stable, mildly elevated  -CT abdomen without hepatic or biliary issues  -see HCV  -continue to monitor    Other emphysema  -seen on CT chest  -counseled on tobacco cessation      Pain of left lower extremity  -Likely chronic radiculopathy and neuropathy  -Continue neurontin 300 mg tid, lidoderm patch    Parapneumonic effusion  -s/p chest tube, tPA x 5.  Chest tube has been removed  -See above       Septic embolism  -TTE negative but concern for endocarditis given multifocal pneumonia  -Continue antibiotics as above      Opioid abuse  -counseled on cessation  -Caution with pain control. On Percocet 10mg Q6H PRN.      Tobacco abuse  -counseled on cessation    Chronic hepatitis C  -high viral load. HIV NR  -counseled on cessation in order to start outpatient treatment for  HCV  -will need to follow-up with hepatology Clinic upon discharge      Intravenous drug abuse  -also +HCV  -counseled on cessation    Hyperlipidemia  - continue Lipitor 20  - has mild transaminitis, but < 3 x ULN      Anemia of chronic disease  -Hb 6.5 on 4/4/2020 s/p transfusion x 1 prbc  -No active bleeding  -ferritin 418, TIBC low  -EGD 3/5 with moderate chronic gastritis.  Completed Protonix daily for 1 month.        Essential hypertension  -BP controlled without antihypertensives  -continue to monitor    Type 2 diabetes mellitus, uncontrolled  - HgbA1c 11%   - BG improved, but a.m. BG high.  Increase to levemir  25 BID.  Cont 8U aspart TID   - SSI      VTE Risk Mitigation (From admission, onward)         Ordered     enoxaparin injection 40 mg  Daily      04/04/20 0800     IP VTE HIGH RISK PATIENT  Once      03/27/20 3131                      Faviola Rocha MD  Department of Hospital Medicine   [facility]

## 2020-04-20 NOTE — ASSESSMENT & PLAN NOTE
-Hb 6.5 on 4/4/2020 s/p transfusion x 1 prbc  -No active bleeding  -ferritin 418, TIBC low  -EGD 3/5 with moderate chronic gastritis.  Completed Protonix daily for 1 month.

## 2020-04-20 NOTE — ASSESSMENT & PLAN NOTE
- HgbA1c 11%   - BG improved, but a.m. BG high.  Increase to levemir  25 BID.  Cont 8U aspart TID   - SSI

## 2020-04-20 NOTE — ASSESSMENT & PLAN NOTE
- TTE negative, but patient with likely septic emboli /pneumonia from presumed endocarditis from iv drug use.  CT head without septic emboli  - resp cx positive for mrsa, klebsiella, ecoli  - pleural fluid/empyema s/p chest tube, tPA x 5 with lactobacillus.  Did not require CT surgery.  Completed Zosyn on 04/18  - initial blood culture 2-22 with MRSA.  He has had subsequent negative Blood cultures since 2/23/20  -  No acid fast bacilli on multiple gram stains, cx pending.  QuantiFERON gold intermediate  - plan to repeat CT chest 5/8.  Is CT chest shows continued cavitary lesions, will plan on doxycycline 100 b.i.d. until resolution of lung lesions  - currently on IV vanc for 6 weeks through left PICC.  Anticipated end date 5/10  - If patient leaves AMA, can offer linezolid, but would be suboptimal treatment.   - appreciate ID following.  Has outpatient follow-up with ID

## 2020-04-20 NOTE — ASSESSMENT & PLAN NOTE
-stable, mildly elevated  -CT abdomen without hepatic or biliary issues  -see HCV  -continue to monitor

## 2020-04-20 NOTE — PLAN OF CARE
Pt has been stable today c no c/o/.Medicated for pain PRN c good relief. Medicated for anxiety x 1 after emotional outburst. Pt is argumentative and combative at times. Can be cooperative and compliant also. Midline to left upper arm site patent. Scheduled for Vanc trough draw tonight.  Pt is ambulatory up ad checo in room. Voiding continent in toilet. Remains on room air c O2 sat stable.Accu check monitored and ss insulin given as needed.  No change in status.Safety precautions maintained. Dr Rocha here to see pt. Pt calmer this afternoon.

## 2020-04-20 NOTE — SUBJECTIVE & OBJECTIVE
Interval History: NAEON. AF.  Reports generalized pain all over .  No other complaints, such as CP, SOB, fever, cough.  Is anxious to go home on 05/10 after the completion of his antibiotics no matter what.  Does not like the mattress here due to chronic back pain.    Review of Systems   Constitutional: Negative for activity change and fever.   HENT: Negative for rhinorrhea and sneezing.    Eyes: Negative for discharge and redness.   Respiratory: Negative for shortness of breath and wheezing.    Cardiovascular: Negative for chest pain and leg swelling.   Gastrointestinal: Negative for abdominal pain and vomiting.   Genitourinary: Negative for difficulty urinating and dysuria.   Musculoskeletal: Positive for myalgias. Negative for gait problem.   Skin: Negative for rash and wound.   Neurological: Negative for dizziness and light-headedness.   Hematological: Negative for adenopathy.   Psychiatric/Behavioral: Negative for confusion. The patient is not nervous/anxious.      Objective:     Vital Signs (Most Recent):  Temp: 96.6 °F (35.9 °C) (04/20/20 0747)  Pulse: 92 (04/20/20 0747)  Resp: 16 (04/20/20 0747)  BP: (!) 112/59 (04/20/20 0747)  SpO2: 99 % (04/20/20 0747) Vital Signs (24h Range):  Temp:  [96.6 °F (35.9 °C)-98.7 °F (37.1 °C)] 96.6 °F (35.9 °C)  Pulse:  [83-92] 92  Resp:  [16-20] 16  SpO2:  [97 %-99 %] 99 %  BP: ()/(53-66) 112/59     Weight: 58.7 kg (129 lb 6.6 oz)  Body mass index is 19.68 kg/m².    Intake/Output Summary (Last 24 hours) at 4/20/2020 1033  Last data filed at 4/20/2020 0229  Gross per 24 hour   Intake 250 ml   Output --   Net 250 ml      Physical Exam   Constitutional: He appears well-developed and well-nourished. No distress.   HENT:   Head: Normocephalic and atraumatic.   Mouth/Throat: Oropharynx is clear and moist.   Eyes: Conjunctivae and EOM are normal. Right eye exhibits no discharge. Left eye exhibits no discharge.   Neck: No tracheal deviation present.   Cardiovascular: Normal  rate, regular rhythm and normal heart sounds.   No murmur heard.  Pulmonary/Chest: Effort normal. No stridor. No respiratory distress. He has no wheezes.   Bibasilar crackles, R>L   Abdominal: Soft. Bowel sounds are normal. He exhibits no distension. There is no tenderness.   Musculoskeletal: He exhibits no edema or tenderness.   Neurological: He is alert. No cranial nerve deficit.   Skin: Skin is warm. No rash noted. He is not diaphoretic.   Psychiatric: He has a normal mood and affect. His behavior is normal.       Significant Labs:   Blood Culture: No results for input(s): LABBLOO in the last 48 hours.  CBC:   Recent Labs   Lab 04/19/20  0452   WBC 6.23   HGB 8.9*   HCT 29.4*        CMP:   Recent Labs   Lab 04/19/20  0452      K 3.9      CO2 25   *   BUN 26*   CREATININE 1.1   CALCIUM 8.9   PROT 7.6   ALBUMIN 2.7*   BILITOT 0.2   ALKPHOS 74   AST 57*   ALT 79*   ANIONGAP 8   EGFRNONAA >60       Significant Imaging: No new imaging

## 2020-04-21 PROBLEM — F41.1 GAD (GENERALIZED ANXIETY DISORDER): Status: ACTIVE | Noted: 2020-04-20

## 2020-04-21 PROBLEM — G47.09 OTHER INSOMNIA: Status: ACTIVE | Noted: 2020-04-21

## 2020-04-21 PROBLEM — F33.0 MILD EPISODE OF RECURRENT MAJOR DEPRESSIVE DISORDER: Status: ACTIVE | Noted: 2020-04-21

## 2020-04-21 PROBLEM — R50.9 FEVER, UNKNOWN ORIGIN: Status: RESOLVED | Noted: 2018-08-04 | Resolved: 2020-04-21

## 2020-04-21 PROBLEM — F19.90 IV DRUG USER: Status: RESOLVED | Noted: 2019-03-12 | Resolved: 2020-04-21

## 2020-04-21 LAB
ALBUMIN SERPL BCP-MCNC: 3 G/DL (ref 3.5–5.2)
ALP SERPL-CCNC: 101 U/L (ref 55–135)
ALT SERPL W/O P-5'-P-CCNC: 126 U/L (ref 10–44)
ANION GAP SERPL CALC-SCNC: 8 MMOL/L (ref 8–16)
AST SERPL-CCNC: 86 U/L (ref 10–40)
BASOPHILS # BLD AUTO: 0.02 K/UL (ref 0–0.2)
BASOPHILS NFR BLD: 0.3 % (ref 0–1.9)
BILIRUB SERPL-MCNC: 0.3 MG/DL (ref 0.1–1)
BUN SERPL-MCNC: 31 MG/DL (ref 6–20)
CALCIUM SERPL-MCNC: 9.2 MG/DL (ref 8.7–10.5)
CHLORIDE SERPL-SCNC: 102 MMOL/L (ref 95–110)
CO2 SERPL-SCNC: 25 MMOL/L (ref 23–29)
CREAT SERPL-MCNC: 1.3 MG/DL (ref 0.5–1.4)
DIFFERENTIAL METHOD: ABNORMAL
EOSINOPHIL # BLD AUTO: 0.1 K/UL (ref 0–0.5)
EOSINOPHIL NFR BLD: 0.9 % (ref 0–8)
ERYTHROCYTE [DISTWIDTH] IN BLOOD BY AUTOMATED COUNT: 18.3 % (ref 11.5–14.5)
EST. GFR  (AFRICAN AMERICAN): >60 ML/MIN/1.73 M^2
EST. GFR  (NON AFRICAN AMERICAN): >60 ML/MIN/1.73 M^2
GLUCOSE SERPL-MCNC: 147 MG/DL (ref 70–110)
HCT VFR BLD AUTO: 32.4 % (ref 40–54)
HGB BLD-MCNC: 9.9 G/DL (ref 14–18)
IMM GRANULOCYTES # BLD AUTO: 0.03 K/UL (ref 0–0.04)
IMM GRANULOCYTES NFR BLD AUTO: 0.4 % (ref 0–0.5)
LYMPHOCYTES # BLD AUTO: 2 K/UL (ref 1–4.8)
LYMPHOCYTES NFR BLD: 28.3 % (ref 18–48)
MCH RBC QN AUTO: 25.7 PG (ref 27–31)
MCHC RBC AUTO-ENTMCNC: 30.6 G/DL (ref 32–36)
MCV RBC AUTO: 84 FL (ref 82–98)
MONOCYTES # BLD AUTO: 0.7 K/UL (ref 0.3–1)
MONOCYTES NFR BLD: 10.3 % (ref 4–15)
NEUTROPHILS # BLD AUTO: 4.1 K/UL (ref 1.8–7.7)
NEUTROPHILS NFR BLD: 59.8 % (ref 38–73)
NRBC BLD-RTO: 0 /100 WBC
PLATELET # BLD AUTO: 283 K/UL (ref 150–350)
PMV BLD AUTO: 9.7 FL (ref 9.2–12.9)
POCT GLUCOSE: 141 MG/DL (ref 70–110)
POCT GLUCOSE: 162 MG/DL (ref 70–110)
POCT GLUCOSE: 411 MG/DL (ref 70–110)
POCT GLUCOSE: 69 MG/DL (ref 70–110)
POTASSIUM SERPL-SCNC: 4.1 MMOL/L (ref 3.5–5.1)
PROT SERPL-MCNC: 8.6 G/DL (ref 6–8.4)
RBC # BLD AUTO: 3.85 M/UL (ref 4.6–6.2)
SODIUM SERPL-SCNC: 135 MMOL/L (ref 136–145)
WBC # BLD AUTO: 6.9 K/UL (ref 3.9–12.7)

## 2020-04-21 PROCEDURE — 80053 COMPREHEN METABOLIC PANEL: CPT

## 2020-04-21 PROCEDURE — 25000003 PHARM REV CODE 250: Performed by: STUDENT IN AN ORGANIZED HEALTH CARE EDUCATION/TRAINING PROGRAM

## 2020-04-21 PROCEDURE — 36415 COLL VENOUS BLD VENIPUNCTURE: CPT

## 2020-04-21 PROCEDURE — 99233 SBSQ HOSP IP/OBS HIGH 50: CPT | Mod: ,,, | Performed by: INTERNAL MEDICINE

## 2020-04-21 PROCEDURE — 25000003 PHARM REV CODE 250: Performed by: INTERNAL MEDICINE

## 2020-04-21 PROCEDURE — 99233 PR SUBSEQUENT HOSPITAL CARE,LEVL III: ICD-10-PCS | Mod: ,,, | Performed by: INTERNAL MEDICINE

## 2020-04-21 PROCEDURE — 25000003 PHARM REV CODE 250: Performed by: PHYSICIAN ASSISTANT

## 2020-04-21 PROCEDURE — 85025 COMPLETE CBC W/AUTO DIFF WBC: CPT

## 2020-04-21 PROCEDURE — 94761 N-INVAS EAR/PLS OXIMETRY MLT: CPT

## 2020-04-21 PROCEDURE — 63600175 PHARM REV CODE 636 W HCPCS: Performed by: STUDENT IN AN ORGANIZED HEALTH CARE EDUCATION/TRAINING PROGRAM

## 2020-04-21 PROCEDURE — 11000001 HC ACUTE MED/SURG PRIVATE ROOM

## 2020-04-21 PROCEDURE — 99900035 HC TECH TIME PER 15 MIN (STAT)

## 2020-04-21 RX ORDER — POLYETHYLENE GLYCOL 3350 17 G/17G
17 POWDER, FOR SOLUTION ORAL DAILY PRN
Status: DISCONTINUED | OUTPATIENT
Start: 2020-04-21 | End: 2020-04-22 | Stop reason: HOSPADM

## 2020-04-21 RX ORDER — DOCUSATE SODIUM 100 MG/1
100 CAPSULE, LIQUID FILLED ORAL 2 TIMES DAILY PRN
Status: DISCONTINUED | OUTPATIENT
Start: 2020-04-21 | End: 2020-04-22 | Stop reason: HOSPADM

## 2020-04-21 RX ORDER — CALCIUM CARBONATE 200(500)MG
500 TABLET,CHEWABLE ORAL DAILY PRN
Status: DISCONTINUED | OUTPATIENT
Start: 2020-04-21 | End: 2020-04-22 | Stop reason: HOSPADM

## 2020-04-21 RX ADMIN — THERA TABS 1 TABLET: TAB at 08:04

## 2020-04-21 RX ADMIN — CALCIUM CARBONATE (ANTACID) CHEW TAB 500 MG 500 MG: 500 CHEW TAB at 11:04

## 2020-04-21 RX ADMIN — GABAPENTIN 300 MG: 300 CAPSULE ORAL at 08:04

## 2020-04-21 RX ADMIN — DULOXETINE HYDROCHLORIDE 60 MG: 30 CAPSULE, DELAYED RELEASE ORAL at 08:04

## 2020-04-21 RX ADMIN — DOCUSATE SODIUM 100 MG: 100 CAPSULE, LIQUID FILLED ORAL at 11:04

## 2020-04-21 RX ADMIN — HYDROXYZINE HYDROCHLORIDE 25 MG: 25 TABLET, FILM COATED ORAL at 07:04

## 2020-04-21 RX ADMIN — INSULIN ASPART 3 UNITS: 100 INJECTION, SOLUTION INTRAVENOUS; SUBCUTANEOUS at 09:04

## 2020-04-21 RX ADMIN — VANCOMYCIN HYDROCHLORIDE 1500 MG: 1.5 INJECTION, POWDER, LYOPHILIZED, FOR SOLUTION INTRAVENOUS at 07:04

## 2020-04-21 RX ADMIN — INSULIN ASPART 8 UNITS: 100 INJECTION, SOLUTION INTRAVENOUS; SUBCUTANEOUS at 08:04

## 2020-04-21 RX ADMIN — GABAPENTIN 300 MG: 300 CAPSULE ORAL at 09:04

## 2020-04-21 RX ADMIN — ONDANSETRON 8 MG: 8 TABLET, ORALLY DISINTEGRATING ORAL at 10:04

## 2020-04-21 RX ADMIN — OXYCODONE AND ACETAMINOPHEN 1 TABLET: 10; 325 TABLET ORAL at 07:04

## 2020-04-21 RX ADMIN — LIDOCAINE 1 PATCH: 50 PATCH CUTANEOUS at 08:04

## 2020-04-21 RX ADMIN — ATORVASTATIN CALCIUM 20 MG: 20 TABLET, FILM COATED ORAL at 08:04

## 2020-04-21 RX ADMIN — GABAPENTIN 300 MG: 300 CAPSULE ORAL at 04:04

## 2020-04-21 RX ADMIN — LIDOCAINE 1 PATCH: 50 PATCH CUTANEOUS at 12:04

## 2020-04-21 RX ADMIN — Medication 6 MG: at 09:04

## 2020-04-21 RX ADMIN — HYDROXYZINE HYDROCHLORIDE 25 MG: 25 TABLET, FILM COATED ORAL at 04:04

## 2020-04-21 RX ADMIN — Medication 1 TABLET: at 08:04

## 2020-04-21 RX ADMIN — OXYCODONE AND ACETAMINOPHEN 1 TABLET: 10; 325 TABLET ORAL at 12:04

## 2020-04-21 RX ADMIN — INSULIN ASPART 8 UNITS: 100 INJECTION, SOLUTION INTRAVENOUS; SUBCUTANEOUS at 12:04

## 2020-04-21 RX ADMIN — OXYCODONE AND ACETAMINOPHEN 1 TABLET: 10; 325 TABLET ORAL at 03:04

## 2020-04-21 RX ADMIN — HYDROXYZINE HYDROCHLORIDE 25 MG: 25 TABLET, FILM COATED ORAL at 10:04

## 2020-04-21 RX ADMIN — POLYETHYLENE GLYCOL 3350 17 G: 17 POWDER, FOR SOLUTION ORAL at 11:04

## 2020-04-21 NOTE — ASSESSMENT & PLAN NOTE
-reports daily anxiety, requiring Vistaril t.i.d. while inpatient  -also contributing to substance abuse  -started Cymbalta 60 daily d/t mood disorder and chronic pain.  Discussed side effects.  Might take 4-6 weeks for medication to take effect  -advised to follow-up with outpatient PCP to continue medication titration

## 2020-04-21 NOTE — ASSESSMENT & PLAN NOTE
-on melatonin p.r.n.  -addressing mood disorder with Cymbalta 60  -also with poor sleep hygiene.  Could consider trazodone due to insomnia

## 2020-04-21 NOTE — ASSESSMENT & PLAN NOTE
-counseled on cessation  -he declined outpatient referral to addiction medicine  -Caution with pain control. On Percocet 10mg Q6H PRN.  Also started bowel regimen  -started Cymbalta to help with pain and anxiety, which are contributing to substance abuse

## 2020-04-21 NOTE — PLAN OF CARE
Patient AAOx4, VSS, on room air. Vital signs not taken between 11p and 5a due to orders not to disturb patient. Patient given Percocet for 7 out of 10 hip pain, which provided positive relieve. Patient's mood is labile, but has been cooperative this shift. Blood glucose monitored and insulin given according to MAR. All safety measures in place. Purposeful rounding completed. Patient instructed to call staff for assistance.

## 2020-04-21 NOTE — PROGRESS NOTES
Pharmacokinetic Assessment Follow Up: IV Vancomycin    Vancomycin serum concentration assessment(s):    The trough level was drawn correctly and can be used to guide therapy at this time. The measurement is within the desired definitive target range of 10 to 20 mcg/mL.    Vancomycin Regimen Plan:    Continue regimen to Vancomycin 1500 mg IV every 24 hours with next serum trough concentration measured at 1930 prior to third dose on 4/22    Drug levels (last 3 results):  Recent Labs   Lab Result Units 04/18/20  1933 04/20/20  1933   Vancomycin-Trough ug/mL 15.5 11.8       Pharmacy will continue to follow and monitor vancomycin.    Please contact pharmacy at extension [phone] for questions regarding this assessment.    Thank you for the consult,   Marley Joyce       Patient brief summary:  Cali Mabry is a 54 y.o. male initiated on antimicrobial therapy with IV Vancomycin for treatment of bacteremia    The patient's current regimen is Vancomycin 1500mg IVPB every 24 hours    Drug Allergies:   Review of patient's allergies indicates:  No Known Allergies    Actual Body Weight:   58.7kg    Renal Function:   Estimated Creatinine Clearance: 64.5 mL/min (based on SCr of 1.1 mg/dL).,     Dialysis Method (if applicable):  N/A    CBC (last 72 hours):  Recent Labs   Lab Result Units 04/19/20  0452   WBC K/uL 6.23   Hemoglobin g/dL 8.9*   Hematocrit % 29.4*   Platelets K/uL 252   Gran% % 58.4   Lymph% % 30.0   Mono% % 9.6   Eosinophil% % 1.3   Basophil% % 0.2   Differential Method  Automated       Metabolic Panel (last 72 hours):  Recent Labs   Lab Result Units 04/19/20  0452   Sodium mmol/L 138   Potassium mmol/L 3.9   Chloride mmol/L 105   CO2 mmol/L 25   Glucose mg/dL 141*   BUN, Bld mg/dL 26*   Creatinine mg/dL 1.1   Albumin g/dL 2.7*   Total Bilirubin mg/dL 0.2   Alkaline Phosphatase U/L 74   AST U/L 57*   ALT U/L 79*       Vancomycin Administrations:  vancomycin given in the last 96 hours                   vancomycin  1.5 g in dextrose 5 % 250 mL IVPB (ready to mix) (mg) 1,500 mg New Bag 04/19/20 2002     1,500 mg New Bag 04/18/20 2107     1,500 mg New Bag 04/17/20 2009                Microbiologic Results:  Microbiology Results (last 7 days)     Procedure Component Value Units Date/Time    Fungus culture [[id number]] Collected:  03/29/20 2137    Order Status:  Completed Specimen:  Body Fluid from Pleural Fluid Updated:  04/14/20 1126     Fungus (Mycology) Culture Culture in progress      No fungus isolated after 2 weeks

## 2020-04-21 NOTE — ASSESSMENT & PLAN NOTE
-Hb 6.5 on 4/4/2020 s/p transfusion x 1 prbc  -EGD 3/5 with moderate chronic gastritis.  Completed Protonix daily for 1 month.  -No active bleeding  -ferritin 418, TIBC low, likely from MRSA/pneumonia  -stable

## 2020-04-21 NOTE — PLAN OF CARE
Pt has been up ad checo today c no c/o. Took all meds s  difficulty. Vs stable. Accu monitored and insulin given order. Evening ac meal insulin held due to BS =69 and pt refused c low sugar. No symptoms of low sugar noted and ate all of meal. PICC to left upper arm site patent. Pt voiding ad checo. Medicated PRN for pain and anxiety. Safety precautions reinforced.

## 2020-04-21 NOTE — ASSESSMENT & PLAN NOTE
-Likely chronic radiculopathy and neuropathy  -Continue neurontin 300 mg tid, lidoderm patch  -started Cymbalta for anxiety, depression and chronic pain

## 2020-04-21 NOTE — SUBJECTIVE & OBJECTIVE
Interval History:  Has labile mood and verbal outbursts, refusing POC BG, vitals check.  He has chronic back pain, 8/10 in severity, which has led to him relapsing on IV heroin.  Reports anxiety daily.  Has difficulty relaxing.  Used to be on Xanax.  Mood is fine.  Has difficulty falling asleep insignificant insomnia.  No anhedonia; is anxious to spend time outside.  Has feelings of guilt.  Has decreased energy.  No SI, HI.  Was amenable to starting Cymbalta 60 yesterday.  Required 1 dose of hydroxyzine for anxiety overnight.  Mood, anxiety, pain are unchanged.  Does have nausea/GI upset today.  Also with constipation.    Review of Systems   Constitutional: Negative for activity change and fever.   HENT: Negative for rhinorrhea and sneezing.    Eyes: Negative for discharge and redness.   Respiratory: Negative for cough and shortness of breath.    Cardiovascular: Negative for chest pain and leg swelling.   Gastrointestinal: Positive for constipation and nausea.   Genitourinary: Negative for difficulty urinating and dysuria.   Musculoskeletal: Positive for arthralgias and back pain. Negative for gait problem.   Skin: Negative for rash and wound.   Neurological: Negative for dizziness and light-headedness.   Hematological: Negative for adenopathy.   Psychiatric/Behavioral: Positive for sleep disturbance. Negative for confusion. The patient is nervous/anxious.      Objective:     Vital Signs (Most Recent):  Temp: 97.5 °F (36.4 °C) (04/20/20 2122)  Pulse: 98 (04/20/20 2140)  Resp: 16 (04/20/20 2140)  BP: 129/63 (04/20/20 2122)  SpO2: 99 % (04/20/20 2140) Vital Signs (24h Range):  Temp:  [97.5 °F (36.4 °C)] 97.5 °F (36.4 °C)  Pulse:  [98] 98  Resp:  [16] 16  SpO2:  [99 %] 99 %  BP: (129)/(63) 129/63     Weight: 58.7 kg (129 lb 6.6 oz)  Body mass index is 19.68 kg/m².    Intake/Output Summary (Last 24 hours) at 4/21/2020 0828  Last data filed at 4/21/2020 0300  Gross per 24 hour   Intake 610 ml   Output --   Net 610 ml       Physical Exam   Constitutional: He appears well-developed and well-nourished. No distress.   HENT:   Head: Normocephalic and atraumatic.   Mouth/Throat: Oropharynx is clear and moist.   Eyes: Conjunctivae and EOM are normal. Right eye exhibits no discharge. Left eye exhibits no discharge.   Neck: No tracheal deviation present.   Cardiovascular: Normal rate, regular rhythm and normal heart sounds.   No murmur heard.  Pulmonary/Chest: Effort normal and breath sounds normal. No stridor. No respiratory distress. He has no wheezes.   Abdominal: Soft. Bowel sounds are normal. He exhibits no distension. There is no tenderness.   Musculoskeletal: He exhibits no edema or tenderness.   Neurological: He is alert. No cranial nerve deficit.   Skin: Skin is warm. No rash noted. He is not diaphoretic.   Psychiatric: He has a normal mood and affect. His behavior is normal.       Significant Labs:   CBC:   Recent Labs   Lab 04/21/20  0420   WBC 6.90   HGB 9.9*   HCT 32.4*        CMP:   Recent Labs   Lab 04/21/20  0420   *   K 4.1      CO2 25   *   BUN 31*   CREATININE 1.3   CALCIUM 9.2   PROT 8.6*   ALBUMIN 3.0*   BILITOT 0.3   ALKPHOS 101   AST 86*   *   ANIONGAP 8   EGFRNONAA >60       Significant Imaging: No new imaging

## 2020-04-21 NOTE — ASSESSMENT & PLAN NOTE
- TTE negative, but patient with likely septic emboli /pneumonia from presumed endocarditis from IV drug use.  CT head without septic emboli  - Resp cx positive for MRSA, klebsiella, ecoli  - Pleural fluid/empyema s/p chest tube, tPA x 6. Cx with lactobacillus.  Did not require CT surgery.  Completed Zosyn on 04/18  - initial blood culture 2-22 with MRSA.  He has had subsequent negative Blood cultures since 2/23/20  - QuantiFERON gold intermediate, but AFBs neg  - plan to repeat CT chest 5/8.  Is CT chest shows persistent cavitary lesions, will plan on doxycycline 100 b.i.d. until resolution of lung lesions  - currently on IV vanc for 6 weeks through left PICC.  Anticipated end date 5/10  - CM looking into LTAC to complete IV antibiotics  - If patient leaves AMA, can offer linezolid, but would be suboptimal treatment.   - appreciate ID following.  Has outpatient follow-up with ID

## 2020-04-21 NOTE — PROGRESS NOTES
"[facility] Hospital Medicine  Progress Note    Patient Name: Cali Mabry  MRN: [id number]  Patient Class: IP- Inpatient   Admission Date: 3/26/2020  Length of Stay: 25 days  Attending Physician: Jaycee Dickinson MD  Primary Care Provider: Cullen Zimmer MD        Subjective:     Principal Problem:Bacterial pneumonia        HPI:  Cali Mabry is a 54 y.o. with DM II, hepatitis C, HTN, polysubstance abuse/IVDU, pancreatitis who presents, after transfer from [facility], with a 2 day history of chest pain and shortness of breath.  Chest pain is centralized and just described as "painful." Pain aggravated with movement and deep breathing. He reports an intermittent cough that is non-productive. He denies sick contacts, fever, chills, sore throat. Upon questioning, patient stated "I have the virus so just treat me for it." Educated patient that testing is still in process. Inquired about IV drug use however he was unable to recall when he last used. Patient swatted examiner's hand after chest palpation, yelling "that hurts!" Patient became aggravated, reported his pain was now worse and requested pain medication. Per nurse, patient given PRN dose an hour prior. Patient encouraged to continue discussing his plan of care however stated, "I don't want to chat."   ED: Afebrile with leukocytosis. CTA chest from 3/26 with dense consolidative changes within the right lower lobe with internal cavitation containing air and fluid; Occlusion is seen of the right lower lobe bronchi which may be secondary to mucoid impaction or aspirated material; Additional multifocal opacities and cavitations.  Vancomycin and Zosyn administered. On 2L NC. VSS. Admitted to hospital medicine for further evaluation and COVID-19 rule out.        Overview/Hospital Course:  53 yo male with IVDU with Hep C, hx of MRSA, Pancreatitis, DM2 (DKA 2/2020), anxiety who presented as a transfer from [facility] for SOB and CP.    He " was initially admitted on 02/22 and treated for DKA d/t noncompliance with insulin.  Infectious workup revealed RLL pneumonia.  Initial blood culture on 02/22 with MRSA in 1 set, likely from IVDU.  He had persistent fever and leukocytosis during his admission.  Subsequent blood cultures no growth, final.  Respiratory cultures with MRSA, E coli, Klebsiella.  PICC line was removed and tip Cx neg.  He was on IV vanc, ceftriaxone, Zosyn due to hypoxemic respiratory failure and possible aspiration.  CXR with some improvement.  CT head, abd without signs of septic emboli.  TTE without vegetation.  He was unable to be discharged to LTAC to complete ABX due to IVDU.  He was discharged on 03/08 without additional ABX.    He then presented to the ED on 03/27 with CP, SOB.  Admitted to recent IV heroin relapse.  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  Labs revealed leukocytosis of 20, elevated procalcitonin.  Flu, COVID testing negative.   Respiratory panel with enterovirus, rhinovirus.  QuantiFERON gold intermediate.  AFBs neg.  He had persistent fevers, but repeat blood cultures have remained negative.  S/p chest tube placement on 03/30 and tPA x 6.  CT surgery determined that no VATS was needed.  His chest tube was removed on 04/02 and was weaned from 2 L nasal cannula to room air.  Pleural fluid cx with lactobacillus.  He completed Zosyn on 04/18 per ID recs.  Given suspected septic emboli -> lungs despite negative TTE, ID recommended IV vanc for 6 weeks (end date 05/10/2020) through his left PICC.  Plan is to repeat CT chest on 05/08 to monitor cavitary lesions.  If lung lesions are persistent, plan to switch to doxycycline p.o. 100 b.i.d until resolution of lung cavitations.  If patient leaves AMA, can offer linezolid, but would be  suboptimal treatment.     He was counseled on cessation of IVDU heroin and tobacco use.  He has HCV with high VL.  HIV NR.  He was instructed to follow-up with hepatology for treatment.  He declined outpatient referral to Addiction Medicine.    He has chronic LLE pain, neuropathy, which led to relapse of IV heroin.  Gabapentin 300 t.i.d. was started and he had improvement of pain with lidocaine patch and Percocet 10.  He also reports daily anxiety and symptoms of depression.  He denied episodes of vero.  Cymbalta 60 was started for chronic pain, anxiety, depression.  He was instructed to establish care with his new PCP for continued SSRI titration.    His DM 2 is uncontrolled with A1c of 11 .  Levemir was gradually titrated to 25 b.i.d with NovoLog 8 t.i.d. A.c.    He has a history of hyperlipidemia.  Lipitor was continued.  CT head showed stable remote infarct and no new infarct or septic emboli.    Interval History:  Has labile mood and verbal outbursts, refusing POC BG, vitals check.  He has chronic back pain, 8/10 in severity, which has led to him relapsing on IV heroin.  Reports anxiety daily.  Has difficulty relaxing.  Used to be on Xanax.  Mood is fine.  Has difficulty falling asleep insignificant insomnia.  No anhedonia; is anxious to spend time outside.  Has feelings of guilt.  Has decreased energy.  No SI, HI.  Was amenable to starting Cymbalta 60 yesterday.  Required 1 dose of hydroxyzine for anxiety overnight.  Mood, anxiety, pain are unchanged.  Does have nausea/GI upset today.  Also with constipation.    Review of Systems   Constitutional: Negative for activity change and fever.   HENT: Negative for rhinorrhea and sneezing.    Eyes: Negative for discharge and redness.   Respiratory: Negative for cough and shortness of breath.    Cardiovascular: Negative for chest pain and leg swelling.   Gastrointestinal: Positive for constipation and nausea.   Genitourinary: Negative for difficulty urinating  and dysuria.   Musculoskeletal: Positive for arthralgias and back pain. Negative for gait problem.   Skin: Negative for rash and wound.   Neurological: Negative for dizziness and light-headedness.   Hematological: Negative for adenopathy.   Psychiatric/Behavioral: Positive for sleep disturbance. Negative for confusion. The patient is nervous/anxious.      Objective:     Vital Signs (Most Recent):  Temp: 97.5 °F (36.4 °C) (04/20/20 2122)  Pulse: 98 (04/20/20 2140)  Resp: 16 (04/20/20 2140)  BP: 129/63 (04/20/20 2122)  SpO2: 99 % (04/20/20 2140) Vital Signs (24h Range):  Temp:  [97.5 °F (36.4 °C)] 97.5 °F (36.4 °C)  Pulse:  [98] 98  Resp:  [16] 16  SpO2:  [99 %] 99 %  BP: (129)/(63) 129/63     Weight: 58.7 kg (129 lb 6.6 oz)  Body mass index is 19.68 kg/m².    Intake/Output Summary (Last 24 hours) at 4/21/2020 0828  Last data filed at 4/21/2020 0300  Gross per 24 hour   Intake 610 ml   Output --   Net 610 ml      Physical Exam   Constitutional: He appears well-developed and well-nourished. No distress.   HENT:   Head: Normocephalic and atraumatic.   Mouth/Throat: Oropharynx is clear and moist.   Eyes: Conjunctivae and EOM are normal. Right eye exhibits no discharge. Left eye exhibits no discharge.   Neck: No tracheal deviation present.   Cardiovascular: Normal rate, regular rhythm and normal heart sounds.   No murmur heard.  Pulmonary/Chest: Effort normal and breath sounds normal. No stridor. No respiratory distress. He has no wheezes.   Abdominal: Soft. Bowel sounds are normal. He exhibits no distension. There is no tenderness.   Musculoskeletal: He exhibits no edema or tenderness.   Neurological: He is alert. No cranial nerve deficit.   Skin: Skin is warm. No rash noted. He is not diaphoretic.   Psychiatric: He has a normal mood and affect. His behavior is normal.       Significant Labs:   CBC:   Recent Labs   Lab 04/21/20  0420   WBC 6.90   HGB 9.9*   HCT 32.4*        CMP:   Recent Labs   Lab 04/21/20  6406    *   K 4.1      CO2 25   *   BUN 31*   CREATININE 1.3   CALCIUM 9.2   PROT 8.6*   ALBUMIN 3.0*   BILITOT 0.3   ALKPHOS 101   AST 86*   *   ANIONGAP 8   EGFRNONAA >60       Significant Imaging: No new imaging      Assessment/Plan:      * Bacterial pneumonia  - TTE negative, but patient with likely septic emboli /pneumonia from presumed endocarditis from IV drug use.  CT head without septic emboli  - Resp cx positive for MRSA, klebsiella, ecoli  - Pleural fluid/empyema s/p chest tube, tPA x 6. Cx with lactobacillus.  Did not require CT surgery.  Completed Zosyn on 04/18  - initial blood culture 2-22 with MRSA.  He has had subsequent negative Blood cultures since 2/23/20  - QuantiFERON gold intermediate, but AFBs neg  - plan to repeat CT chest 5/8.  Is CT chest shows persistent cavitary lesions, will plan on doxycycline 100 b.i.d. until resolution of lung lesions  - currently on IV vanc for 6 weeks through left PICC.  Anticipated end date 5/10  - CM looking into LTAC to complete IV antibiotics  - If patient leaves AMA, can offer linezolid, but would be suboptimal treatment.   - appreciate ID following.  Has outpatient follow-up with ID    Other insomnia  -on melatonin p.r.n.  -addressing mood disorder with Cymbalta 60  -also with poor sleep hygiene.  Could consider trazodone due to insomnia      Mild episode of recurrent major depressive disorder  -no history of vero  -see anxiety      YONAS (generalized anxiety disorder)  -reports daily anxiety, requiring Vistaril t.i.d. while inpatient  -also contributing to substance abuse  -started Cymbalta 60 daily d/t mood disorder and chronic pain.  Discussed side effects.  Might take 4-6 weeks for medication to take effect  -advised to follow-up with outpatient PCP to continue medication titration      Transaminitis  -stable, mildly elevated  -CT abdomen without hepatic or biliary issues  -see HCV  -continue to monitor    Other emphysema  -seen on CT  chest  -encouraged continued tobacco cessation      Pain of left lower extremity  -Likely chronic radiculopathy and neuropathy  -Continue neurontin 300 mg tid, lidoderm patch  -started Cymbalta for anxiety, depression and chronic pain    Parapneumonic effusion  -s/p chest tube, tPA x 6.  Chest tube has been removed.  Doing well on room air.  -See above       Septic embolism  -TTE negative but concern for endocarditis given multifocal pneumonia  -Continue antibiotics as above      Opioid abuse  -counseled on cessation  -he declined outpatient referral to addiction medicine  -Caution with pain control. On Percocet 10mg Q6H PRN.  Also started bowel regimen  -started Cymbalta to help with pain and anxiety, which are contributing to substance abuse      Tobacco abuse  -quit smoking 6 months ago.  Encouraged continued cessation.    Chronic hepatitis C  -high viral load. HIV NR  -counseled on cessation in order to start outpatient treatment for HCV  -ordered referral to hepatology Clinic upon discharge      Intravenous drug abuse  -also +HCV  -counseled on cessation  -he declined outpatient referral to addiction medicine    Hyperlipidemia  - continue Lipitor 20  - has mild transaminitis, but < 3 x ULN      Anemia of chronic disease  -Hb 6.5 on 4/4/2020 s/p transfusion x 1 prbc  -EGD 3/5 with moderate chronic gastritis.  Completed Protonix daily for 1 month.  -No active bleeding  -ferritin 418, TIBC low, likely from MRSA/pneumonia  -stable      Essential hypertension  -BP controlled without antihypertensives  -continue to monitor    Type 2 diabetes mellitus, uncontrolled  - HgbA1c 11%   - BG improved, but a.m. BG high.  Increased to levemir 25 BID.  Cont 8U aspart TID   - SSI.  Monitor BG and continue to titrate insulin regimen    VTE Risk Mitigation (From admission, onward)         Ordered     enoxaparin injection 40 mg  Daily      04/04/20 0800     IP VTE HIGH RISK PATIENT  Once      03/27/20 4841                       Faviola Rocha MD  Department of Hospital Medicine   [facility]

## 2020-04-21 NOTE — ASSESSMENT & PLAN NOTE
-high viral load. HIV NR  -counseled on cessation in order to start outpatient treatment for HCV  -ordered referral to hepatology Clinic upon discharge

## 2020-04-21 NOTE — ASSESSMENT & PLAN NOTE
Improved     -oxyCODONE immediate release [id number] mg as patient his pain is no controlled PRN, moderate pain  - should get better with draining of fluid   Statement Selected

## 2020-04-21 NOTE — ASSESSMENT & PLAN NOTE
- HgbA1c 11%   - BG improved, but a.m. BG high.  Increased to levemir 25 BID.  Cont 8U aspart TID   - SSI.  Monitor BG and continue to titrate insulin regimen

## 2020-04-22 VITALS
HEART RATE: 101 BPM | WEIGHT: 129.44 LBS | OXYGEN SATURATION: 97 % | HEIGHT: 68 IN | BODY MASS INDEX: 19.62 KG/M2 | SYSTOLIC BLOOD PRESSURE: 112 MMHG | RESPIRATION RATE: 18 BRPM | TEMPERATURE: 98 F | DIASTOLIC BLOOD PRESSURE: 57 MMHG

## 2020-04-22 PROBLEM — J86.9 EMPYEMA LUNG: Status: ACTIVE | Noted: 2020-04-22

## 2020-04-22 LAB
POCT GLUCOSE: 166 MG/DL (ref 70–110)
POCT GLUCOSE: 253 MG/DL (ref 70–110)
POCT GLUCOSE: 262 MG/DL (ref 70–110)
POCT GLUCOSE: 61 MG/DL (ref 70–110)

## 2020-04-22 PROCEDURE — 25000003 PHARM REV CODE 250: Performed by: INTERNAL MEDICINE

## 2020-04-22 PROCEDURE — 25000003 PHARM REV CODE 250: Performed by: STUDENT IN AN ORGANIZED HEALTH CARE EDUCATION/TRAINING PROGRAM

## 2020-04-22 PROCEDURE — 99238 HOSP IP/OBS DSCHRG MGMT 30/<: CPT | Mod: ,,, | Performed by: INTERNAL MEDICINE

## 2020-04-22 PROCEDURE — 25000003 PHARM REV CODE 250: Performed by: PHYSICIAN ASSISTANT

## 2020-04-22 PROCEDURE — 99238 PR HOSPITAL DISCHARGE DAY,<30 MIN: ICD-10-PCS | Mod: ,,, | Performed by: INTERNAL MEDICINE

## 2020-04-22 PROCEDURE — 99900035 HC TECH TIME PER 15 MIN (STAT)

## 2020-04-22 PROCEDURE — 94761 N-INVAS EAR/PLS OXIMETRY MLT: CPT

## 2020-04-22 RX ORDER — GABAPENTIN 300 MG/1
300 CAPSULE ORAL 3 TIMES DAILY
Status: ON HOLD
Start: 2020-04-22 | End: 2020-05-11 | Stop reason: HOSPADM

## 2020-04-22 RX ORDER — MULTIVITAMIN
1 TABLET ORAL DAILY
Start: 2020-04-22 | End: 2022-09-02

## 2020-04-22 RX ORDER — POLYETHYLENE GLYCOL 3350 17 G/17G
17 POWDER, FOR SOLUTION ORAL DAILY PRN
Start: 2020-04-22 | End: 2021-09-16

## 2020-04-22 RX ORDER — GABAPENTIN 300 MG/1
300 CAPSULE ORAL 3 TIMES DAILY
Qty: 90 CAPSULE | Refills: 0
Start: 2020-04-22 | End: 2020-04-22

## 2020-04-22 RX ORDER — INSULIN LISPRO 100 [IU]/ML
10 INJECTION, SOLUTION INTRAVENOUS; SUBCUTANEOUS
Status: ON HOLD
Start: 2020-04-22 | End: 2020-08-14 | Stop reason: SDUPTHER

## 2020-04-22 RX ORDER — CALCIUM CARBONATE 200(500)MG
500 TABLET,CHEWABLE ORAL DAILY PRN
COMMUNITY
Start: 2020-04-22 | End: 2020-04-22

## 2020-04-22 RX ORDER — ONDANSETRON 8 MG/1
8 TABLET, ORALLY DISINTEGRATING ORAL EVERY 8 HOURS PRN
Qty: 30 TABLET | Refills: 0
Start: 2020-04-22 | End: 2020-04-22

## 2020-04-22 RX ORDER — LIDOCAINE 50 MG/G
1 PATCH TOPICAL DAILY PRN
Qty: 30 PATCH | Refills: 0
Start: 2020-04-22 | End: 2020-04-22

## 2020-04-22 RX ORDER — ONDANSETRON 8 MG/1
8 TABLET, ORALLY DISINTEGRATING ORAL EVERY 8 HOURS PRN
Status: ON HOLD
Start: 2020-04-22 | End: 2020-08-14 | Stop reason: HOSPADM

## 2020-04-22 RX ORDER — HYDROXYZINE HYDROCHLORIDE 25 MG/1
25 TABLET, FILM COATED ORAL 3 TIMES DAILY PRN
Qty: 60 TABLET | Refills: 0
Start: 2020-04-22 | End: 2020-04-22

## 2020-04-22 RX ORDER — CALCIUM CARBONATE 200(500)MG
500 TABLET,CHEWABLE ORAL DAILY PRN
Status: ON HOLD
Start: 2020-04-22 | End: 2022-09-20

## 2020-04-22 RX ORDER — DULOXETIN HYDROCHLORIDE 60 MG/1
60 CAPSULE, DELAYED RELEASE ORAL DAILY
Qty: 30 CAPSULE | Refills: 0
Start: 2020-04-23 | End: 2020-04-22

## 2020-04-22 RX ORDER — LIDOCAINE 50 MG/G
1 PATCH TOPICAL DAILY PRN
Status: ON HOLD
Start: 2020-04-22 | End: 2020-09-28 | Stop reason: HOSPADM

## 2020-04-22 RX ORDER — LIDOCAINE 50 MG/G
1 PATCH TOPICAL DAILY
Start: 2020-04-22 | End: 2021-12-29

## 2020-04-22 RX ORDER — TALC
6 POWDER (GRAM) TOPICAL NIGHTLY PRN
Refills: 0 | COMMUNITY
Start: 2020-04-22 | End: 2020-04-22

## 2020-04-22 RX ORDER — DULOXETIN HYDROCHLORIDE 60 MG/1
60 CAPSULE, DELAYED RELEASE ORAL DAILY
Status: ON HOLD
Start: 2020-04-23 | End: 2022-09-20

## 2020-04-22 RX ORDER — MULTIVITAMIN
1 TABLET ORAL DAILY
COMMUNITY
Start: 2020-04-22 | End: 2020-04-22 | Stop reason: SDUPTHER

## 2020-04-22 RX ORDER — TALC
6 POWDER (GRAM) TOPICAL NIGHTLY PRN
Status: ON HOLD
Start: 2020-04-22 | End: 2022-09-20

## 2020-04-22 RX ORDER — INSULIN ASPART 100 [IU]/ML
10 INJECTION, SOLUTION INTRAVENOUS; SUBCUTANEOUS
Status: DISCONTINUED | OUTPATIENT
Start: 2020-04-22 | End: 2020-04-22 | Stop reason: HOSPADM

## 2020-04-22 RX ORDER — INSULIN GLARGINE 100 [IU]/ML
22 INJECTION, SOLUTION SUBCUTANEOUS 2 TIMES DAILY
Qty: 10 ML | Refills: 0
Start: 2020-04-22 | End: 2020-04-22

## 2020-04-22 RX ORDER — INSULIN LISPRO 100 [IU]/ML
10 INJECTION, SOLUTION INTRAVENOUS; SUBCUTANEOUS
Qty: 9 ML | Refills: 0
Start: 2020-04-22 | End: 2020-04-22 | Stop reason: SDUPTHER

## 2020-04-22 RX ORDER — DOCUSATE SODIUM 100 MG/1
100 CAPSULE, LIQUID FILLED ORAL 2 TIMES DAILY PRN
Start: 2020-04-22 | End: 2022-09-02

## 2020-04-22 RX ORDER — INSULIN GLARGINE 100 [IU]/ML
22 INJECTION, SOLUTION SUBCUTANEOUS 2 TIMES DAILY
Status: ON HOLD
Start: 2020-04-22 | End: 2020-08-08

## 2020-04-22 RX ORDER — HYDROXYZINE HYDROCHLORIDE 25 MG/1
25 TABLET, FILM COATED ORAL 3 TIMES DAILY PRN
Start: 2020-04-22 | End: 2022-09-02

## 2020-04-22 RX ORDER — POLYETHYLENE GLYCOL 3350 17 G/17G
17 POWDER, FOR SOLUTION ORAL DAILY PRN
Qty: 30 EACH | Refills: 0
Start: 2020-04-22 | End: 2020-04-22

## 2020-04-22 RX ORDER — LIDOCAINE 50 MG/G
1 PATCH TOPICAL DAILY
Qty: 30 PATCH | Refills: 0
Start: 2020-04-22 | End: 2020-04-22

## 2020-04-22 RX ORDER — DOCUSATE SODIUM 100 MG/1
100 CAPSULE, LIQUID FILLED ORAL 2 TIMES DAILY PRN
Qty: 60 CAPSULE | Refills: 0
Start: 2020-04-22 | End: 2020-04-22

## 2020-04-22 RX ADMIN — HYDROXYZINE HYDROCHLORIDE 25 MG: 25 TABLET, FILM COATED ORAL at 05:04

## 2020-04-22 RX ADMIN — OXYCODONE AND ACETAMINOPHEN 1 TABLET: 10; 325 TABLET ORAL at 08:04

## 2020-04-22 RX ADMIN — INSULIN ASPART 3 UNITS: 100 INJECTION, SOLUTION INTRAVENOUS; SUBCUTANEOUS at 05:04

## 2020-04-22 RX ADMIN — LIDOCAINE 1 PATCH: 50 PATCH CUTANEOUS at 08:04

## 2020-04-22 RX ADMIN — DULOXETINE HYDROCHLORIDE 60 MG: 30 CAPSULE, DELAYED RELEASE ORAL at 08:04

## 2020-04-22 RX ADMIN — GABAPENTIN 300 MG: 300 CAPSULE ORAL at 02:04

## 2020-04-22 RX ADMIN — ATORVASTATIN CALCIUM 20 MG: 20 TABLET, FILM COATED ORAL at 08:04

## 2020-04-22 RX ADMIN — Medication 1 TABLET: at 08:04

## 2020-04-22 RX ADMIN — GABAPENTIN 300 MG: 300 CAPSULE ORAL at 08:04

## 2020-04-22 RX ADMIN — THERA TABS 1 TABLET: TAB at 08:04

## 2020-04-22 RX ADMIN — INSULIN ASPART 10 UNITS: 100 INJECTION, SOLUTION INTRAVENOUS; SUBCUTANEOUS at 05:04

## 2020-04-22 RX ADMIN — INSULIN ASPART 10 UNITS: 100 INJECTION, SOLUTION INTRAVENOUS; SUBCUTANEOUS at 11:04

## 2020-04-22 RX ADMIN — LIDOCAINE 1 PATCH: 50 PATCH CUTANEOUS at 01:04

## 2020-04-22 RX ADMIN — OXYCODONE AND ACETAMINOPHEN 1 TABLET: 10; 325 TABLET ORAL at 02:04

## 2020-04-22 RX ADMIN — ACETAMINOPHEN 650 MG: 325 TABLET ORAL at 07:04

## 2020-04-22 RX ADMIN — HYDROXYZINE HYDROCHLORIDE 25 MG: 25 TABLET, FILM COATED ORAL at 02:04

## 2020-04-22 RX ADMIN — OXYCODONE AND ACETAMINOPHEN 1 TABLET: 10; 325 TABLET ORAL at 01:04

## 2020-04-22 NOTE — PLAN OF CARE
Pt is independent at baseline.  No DME or therapy needs expected at time of hospital DC.  PT has signed off.      Opiod user, admitted for endocarditis.  Anticipated IV antibiotics stop date 5/10/20.  Pt current IVDA, not candidate for IV home infusion.      Pt has been accepted to [facility] in [place] ([address] [address] [facility] [address] - [place], [place]).  Spoke with Natasha [phone].  They have received auth and anticipate taking pt on Thursday.      Patient updated, agreeable to transfer.  All questions answered, verbalizes understanding.     Pt was previously seen by Dr. PETER Coffman for Primary care, this clinic has discharged pt.  Insurance has assigned Dr. JACKLYN Zimmer as new PCP.  Followup appt scheduled for Maryan 3 at 1:30 pm.  Information added to AVS.     Ambulatory referral sent for pt to followup with hepatitis clinic at [facility], they will call pt to schedule.      Followup appt with ID, Dr. Pereira ([phone]) requested, they will call to schedule followup for week of May 18.     Pt states he has a diabetic doctor on the [facility] that he prefers to follow with, state he has MD name at home and he wishes to schedule his own appointment with this physician.     CM to continue to follow.

## 2020-04-22 NOTE — SUBJECTIVE & OBJECTIVE
Interval History:  Was hypoglycemic to 69 yesterday afternoon and did not receive prandial insulin.  He ate his entire meal.  However, his bedtime BG was 411.  BG was 61 this morning which increased to 166 after eating breakfast.  Did not require additional glucose.  He was unaware/asymptomatic during hypoglycemia episodes.  He does not always like the food in the hospital and requests double portions.  He also brings in outside food such as cheetos and diet Coke.  Had a bowel movement yesterday after starting bowel regimen.    Review of Systems   Constitutional: Negative for activity change, appetite change and fever.   HENT: Negative for rhinorrhea and sneezing.    Eyes: Negative for discharge and redness.   Respiratory: Negative for shortness of breath and wheezing.    Cardiovascular: Negative for chest pain and leg swelling.   Gastrointestinal: Negative for abdominal pain, constipation and nausea.   Genitourinary: Negative for difficulty urinating and dysuria.   Musculoskeletal: Positive for arthralgias. Negative for gait problem.   Skin: Negative for rash and wound.   Neurological: Negative for dizziness and light-headedness.   Hematological: Negative for adenopathy.   Psychiatric/Behavioral: Negative for confusion. The patient is not nervous/anxious.      Objective:     Vital Signs (Most Recent):  Temp: 97.7 °F (36.5 °C) (04/22/20 0808)  Pulse: 82 (04/22/20 0808)  Resp: 18 (04/22/20 0808)  BP: 111/62 (04/22/20 0808)  SpO2: 98 % (04/22/20 0808) Vital Signs (24h Range):  Temp:  [97.7 °F (36.5 °C)-98.9 °F (37.2 °C)] 97.7 °F (36.5 °C)  Pulse:  [] 82  Resp:  [18-20] 18  SpO2:  [96 %-98 %] 98 %  BP: (111-121)/(62-75) 111/62     Weight: 58.7 kg (129 lb 6.6 oz)  Body mass index is 19.68 kg/m².  No intake or output data in the 24 hours ending 04/22/20 0842   Physical Exam   Constitutional: He appears well-developed and well-nourished. No distress.   HENT:   Head: Normocephalic and atraumatic.   Mouth/Throat:  Oropharynx is clear and moist.   Eyes: Conjunctivae and EOM are normal. Right eye exhibits no discharge. Left eye exhibits no discharge.   Neck: No tracheal deviation present.   Cardiovascular: Normal rate, regular rhythm and normal heart sounds.   No murmur heard.  Pulmonary/Chest: Effort normal. No stridor. No respiratory distress. He has no wheezes.   Bibasilar crackles, worse on the right side   Abdominal: Soft. Bowel sounds are normal. He exhibits no distension. There is no tenderness.   Musculoskeletal: He exhibits no edema or tenderness.   Neurological: He is alert. No cranial nerve deficit.   Skin: Skin is warm. No rash noted. He is not diaphoretic.   Psychiatric: He has a normal mood and affect. His behavior is normal.       Significant Labs:   CBC:   Recent Labs   Lab 04/21/20  0420   WBC 6.90   HGB 9.9*   HCT 32.4*        CMP:   Recent Labs   Lab 04/21/20  0420   *   K 4.1      CO2 25   *   BUN 31*   CREATININE 1.3   CALCIUM 9.2   PROT 8.6*   ALBUMIN 3.0*   BILITOT 0.3   ALKPHOS 101   AST 86*   *   ANIONGAP 8   EGFRNONAA >60       Significant Imaging: No new imaging

## 2020-04-22 NOTE — PLAN OF CARE
"Pt remains free from falls. Vitals were stable throughout the night on room air. Positions self independently. Pain managed with PO medications, no complaints of nausea. Patient refused hourly rounding, states, "I do not want to be disturbed." Bed in low position and call light within reach. Will continue to monitor.   "

## 2020-04-22 NOTE — DISCHARGE SUMMARY
"[facility]  Hospital Medicine  Discharge Summary      Patient Name: Cali Mabry  MRN: [id number]  Admission Date: 3/26/2020  Hospital Length of Stay: 26 days  Discharge Date and Time:  04/22/2020 3:34 PM  Attending Physician: Jaycee Dickinson MD   Discharging Provider: Faviola Rocha MD  Primary Care Provider: Cullen Zimmer MD      HPI:   Cali Mabry is a 54 y.o. with DM II, hepatitis C, HTN, polysubstance abuse/IVDU, pancreatitis who presents, after transfer from [facility], with a 2 day history of chest pain and shortness of breath.  Chest pain is centralized and just described as "painful." Pain aggravated with movement and deep breathing. He reports an intermittent cough that is non-productive. He denies sick contacts, fever, chills, sore throat. Upon questioning, patient stated "I have the virus so just treat me for it." Educated patient that testing is still in process. Inquired about IV drug use however he was unable to recall when he last used. Patient swatted examiner's hand after chest palpation, yelling "that hurts!" Patient became aggravated, reported his pain was now worse and requested pain medication. Per nurse, patient given PRN dose an hour prior. Patient encouraged to continue discussing his plan of care however stated, "I don't want to chat."     ED: Afebrile with leukocytosis. CTA chest from 3/26 with dense consolidative changes within the right lower lobe with internal cavitation containing air and fluid; Occlusion is seen of the right lower lobe bronchi which may be secondary to mucoid impaction or aspirated material; Additional multifocal opacities and cavitations.  Vancomycin and Zosyn administered. On 2L NC. VSS. Admitted to hospital medicine for further evaluation and COVID-19 rule out.      * No surgery found *      Hospital Course:   53 yo male with IVDU with Hep C, hx of MRSA, Pancreatitis, DM2 (DKA 2/2020), anxiety who presented [facility]B and [facility].    He was initially admitted on 02/22 and treated for DKA d/t noncompliance with insulin.  Infectious workup revealed RLL pneumonia.  Initial blood culture on 02/22 with MRSA in 1 set, likely from IVDU.  He had persistent fever and leukocytosis during his admission.  Subsequent blood cultures no growth, final.  Respiratory cultures with MRSA, E coli, Klebsiella.  PICC line was removed and tip Cx neg.  He was on IV vanc, ceftriaxone, Zosyn due to hypoxemic respiratory failure and possible aspiration.  CXR with some improvement.  CT head, abd without signs of septic emboli.  TTE without vegetation.  He was unable to be discharged to LTAC to complete ABX due to IVDU.  He was discharged on 03/08 without additional ABX.    He then presented to the ED on 03/27 with CP, SOB.  Admitted to recent IV heroin relapse.  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  Labs revealed leukocytosis of 20, elevated procalcitonin.  Flu, COVID testing negative.   Respiratory panel with enterovirus, rhinovirus.  QuantiFERON gold intermediate.  AFBs neg.  He had persistent fevers, but repeat blood cultures have remained negative.  S/p chest tube placement on 03/30 and tPA x 6.  CT surgery determined that no VATS was needed.  His chest tube was removed on 04/02 and he was weaned from 2 L nasal cannula to room air.  Pleural fluid cx with lactobacillus.  He completed Zosyn on 04/18 per ID recs.  Given suspected septic emboli -> lungs despite negative TTE, ID recommended IV vanc for 6 weeks (end date 05/10/2020) through his left PICC.  He was discharged to [facility] in [place] to complete course of IV antibiotics.  His Vanc trough needs to be drawn on 04/25 at 1930, 30 min before the 4th dose.     Plan is to repeat CT chest on  05/08 to monitor cavitary lesions.  If lung lesions are persistent, plan is to switch to doxycycline p.o. 100 b.i.d until resolution of lung cavitations.  If patient leaves AMA, can offer linezolid, but would be suboptimal treatment.  He has an outpatient appointment with ID.    He was counseled on cessation of IVDU heroin and tobacco use.  He has HCV with high VL.  HIV NR.  He was instructed to follow-up with hepatology for treatment.  He declined outpatient referral to Addiction Medicine.    He has chronic LLE pain, neuropathy, which led to relapse of IV heroin.  Gabapentin 300 t.i.d. was started and he had improvement of pain with lidocaine patch and Percocet 10.  He also reports daily anxiety and mild symptoms of depression.  He denied episodes of vero.  Cymbalta 60 was started for chronic pain, anxiety, depression.  He was instructed to establish care with his new PCP for continued SSRI titration.    His DM 1 (diagnosed 40 years ago) is uncontrolled with A1c of 11 .  Levemir was gradually titrated to 22 b.i.d with NovoLog 8 t.i.d. A.c. His BG varied widely (60-400s) with hypoglycemia unawareness due to noncompliance with diet.  He was often eating outside food and soft drinks.  He declined speaking with diabetes education.  He states that he will make an appointment with endocrinology on the [facility].    He has a history of hyperlipidemia.  Lipitor was continued.  CT head showed stable remote infarct and no new infarct or septic emboli.     Consults:   Consults (From admission, onward)        Status Ordering Provider     Inpatient consult to Cardiothoracic Surgery  Once     Provider:  (Not yet assigned)    Completed DEIDRA DAHL     Inpatient consult to Infectious Diseases  Once     Provider:  (Not yet assigned)    Completed PAWEL LERNER     Inpatient consult to Midline team  Once     Provider:  (Not yet assigned)    Completed MICHELLE JOHN     Inpatient consult to Midline team   Once     Provider:  (Not yet assigned)    Completed DONAL WHITE     Inpatient consult to Midline team  Once     Provider:  (Not yet assigned)    Completed KEARA BURK     Inpatient consult to PICC team (NIAS)  Once     Provider:  (Not yet assigned)    Completed DONAL WHITE     Inpatient consult to Pulmonology  Once     Provider:  (Not yet assigned)    Completed RAVI BRANDON     Pharmacy to dose Vancomycin consult  Once     Provider:  (Not yet assigned)    Acknowledged ARMIDA RITCHIE     Pharmacy to dose Vancomycin consult  Once     Provider:  (Not yet assigned)    Acknowledged PAWEL LERNER          No new Assessment & Plan notes have been filed under this hospital service since the last note was generated.  Service: Hospital Medicine    Final Active Diagnoses:    Diagnosis Date Noted POA    PRINCIPAL PROBLEM:  Bacterial pneumonia [J15.9] 02/29/2020 Yes    Mild episode of recurrent major depressive disorder [F33.0] 04/21/2020 Yes    Other insomnia [G47.09] 04/21/2020 Yes    Other emphysema [J43.8] 04/20/2020 Yes    Transaminitis [R74.0] 04/20/2020 Yes    YONAS (generalized anxiety disorder) [F41.1] 04/20/2020 Yes    Pain of left lower extremity [M79.605] 04/18/2020 Yes    Parapneumonic effusion [J18.9, J91.8] 03/30/2020 Yes    Septic embolism [I76] 03/27/2020 Yes    Opioid abuse [F11.10] 08/05/2018 Yes     Chronic    Hyperlipidemia [E78.5] 11/23/2017 Yes     Chronic    Intravenous drug abuse [F19.10] 11/23/2017 Yes     Chronic    Chronic hepatitis C [B18.2] 11/23/2017 Yes     Chronic    Tobacco abuse [Z72.0] 11/23/2017 Yes     Chronic    Type 1 diabetes mellitus with hyperglycemia [E10.65] 04/03/2016 Yes    Essential hypertension [I10] 04/03/2016 Yes     Chronic    Anemia of chronic disease [D63.8] 04/03/2016 Yes      Problems Resolved During this Admission:    Diagnosis Date Noted Date Resolved POA    Acute hypoxemic respiratory failure [J96.01] 04/19/2020 04/19/2020  Yes    Weight loss, unintentional [R63.4] 04/17/2020 04/18/2020 Clinically Undetermined    Iron deficiency anemia [D50.9] 04/04/2020 04/20/2020 Yes     Chronic    Reactive thrombocytosis [R79.89] 04/04/2020 04/18/2020 Yes    Chest pain [R07.9] 03/28/2020 04/04/2020 Yes    RAMYA (acute kidney injury) [N17.9] 02/22/2020 04/20/2020 No    Hyponatremia [E87.1] 04/13/2019 04/18/2020 Yes    Anemia of chronic disease [D63.8] 11/23/2017 04/18/2020 Yes     Chronic       Discharged Condition: good    Disposition: [facility]    Follow Up:  Follow-up Information     Go to Zonia Pereira MD.    Specialty:  Infectious Diseases  Why:  they will call you to schedule followup appointment with infectious disease  Contact information:  [address] [address]  [phone]             [facility] - Hepatology.    Specialty:  Hepatology  Why:  they will call you to schedule first appointment with hepatitis clinic  Contact information:  [address] [place] [id number] [phone]  Additional information:  1st Floor - [facility], located by clinic tower elevators           Cullen Zimmer MD. Go on 6/3/2020.    Specialty:  Internal Medicine  Why:  at 1:30 pm for hospital followup  Contact information:  [address]  [address]  [facility] [address]  [phone]             Endocrinology.    Why:  Please make an appointment with endocrinologist on the [facility]           Go to [facility].    Specialty:  Telemedicine  Why:  for LTAC services  Contact information:  [address]  [address]nd Floor  [facility] [address]  [phone]                 Patient Instructions:      Ambulatory referral/consult to Hepatology   Standing Status: Future   Referral Priority: Routine Referral Type: Consultation   Referral Reason: Specialty Services Required   Requested Specialty: Hepatology   Number of Visits Requested: 1     Diet  diabetic     Notify your health care provider if you experience any of the following:  temperature >100.4     Notify your health care provider if you experience any of the following:  difficulty breathing or increased cough     Activity as tolerated       Significant Diagnostic Studies: Microbiology:   Blood Culture   Lab Results   Component Value Date    LABBLOO No growth after 4 days 04/01/2020    LABBLOO No growth after 4 days 04/01/2020    and Sputum Culture   Lab Results   Component Value Date    GSRESP <10 epithelial cells per low power field. 03/29/2020    GSRESP Many WBC's 03/29/2020    GSRESP Rare Gram positive rods 03/29/2020    GSRESP Rare Gram negative rods 03/29/2020    RESPIRATORYC No Pseudomonas isolated. 03/29/2020    RESPIRATORYC METHICILLIN RESISTANT STAPHYLOCOCCUS AUREUS  Few   (A) 03/29/2020    RESPIRATORYC (A) 03/29/2020     KLEBSIELLA PNEUMONIAE  Rare  Normal respiratory ella also present       Radiology: CT scan: CT chest with Cavitary lesions    Pending Diagnostic Studies:     Procedure Component Value Units Date/Time    CBC with Automated Differential [[id number]] Collected:  04/15/20 0530    Order Status:  Sent Lab Status:  No result     Specimen:  Blood     Comprehensive metabolic panel [[id number]] Collected:  04/15/20 0530    Order Status:  Sent Lab Status:  No result     Specimen:  Blood     Legionella culture Sputum, Expectorated [[id number]] Collected:  03/29/20 1843    Order Status:  Sent Lab Status:  In process Updated:  03/29/20 2004    Specimen:  Sputum Expectorated     VANCOMYCIN, TROUGH before 4th dose [[id number]] Collected:  04/15/20 1924    Order Status:  Sent Lab Status:  In process Updated:  04/15/20 1924    Specimen:  Blood     VANCOMYCIN, TROUGH before 4th dose [[id number]] Collected:  03/27/20 1148    Order Status:  Sent Lab Status:  In process Updated:  03/27/20 1148    Specimen:  Blood          Medications:  Reconciled Home Medications:      Medication List      START  taking these medications    ACCU-CHEK MARINO PLUS METER [facility]  Generic drug:  blood-glucose meter  To check Blood glucose three times daily,     ACCU-CHEK MARINO PLUS TEST STRP [facility]  Generic drug:  blood sugar diagnostic  Use To check blood glucose three times daily,     ACCU-CHEK SOFTCLIX LANCETS Misc  Generic drug:  lancets  To check BG 3 times daily, to use with insurance preferred meter     calcium carbonate 200 mg calcium (500 mg) chewable tablet  Commonly known as:  TUMS  Take 1 tablet (500 mg total) by mouth daily as needed for Heartburn.     docusate sodium 100 MG capsule  Commonly known as:  COLACE  Take 1 capsule (100 mg total) by mouth 2 (two) times daily as needed for Constipation.     DULoxetine 60 MG capsule  Commonly known as:  CYMBALTA  Take 1 capsule (60 mg total) by mouth once daily.  Start taking on:  April 23, 2020     folic acid-vit B6-vit B12 2.5-25-2 mg 2.5-25-2 mg Tab  Commonly known as:  FOLBIC or Equiv  Take 1 tablet by mouth once daily.  Start taking on:  April 23, 2020     gabapentin 300 MG capsule  Commonly known as:  NEURONTIN  Take 1 capsule (300 mg total) by mouth 3 (three) times daily.     hydroxyzine HCL 25 MG tablet  Commonly known as:  ATARAX  Take 1 tablet (25 mg total) by mouth 3 (three) times daily as needed for Anxiety.     insulin lispro 100 unit/mL injection  Inject 10 Units into the skin 3 (three) times daily before meals.     * lidocaine 5 %  Commonly known as:  LIDODERM  Place 1 patch onto the skin once daily. Remove & Discard patch within 12 hours or as directed by MD.  Apply to shoulder for pain     * lidocaine 5 %  Commonly known as:  LIDODERM  Place 1 patch onto the skin daily as needed. Remove & Discard patch within 12 hours or as directed by MD.  Apply to back for pain     melatonin 3 mg tablet  Commonly known as:  MELATIN  Take 2 tablets (6 mg total) by mouth nightly as needed for Insomnia.     multivitamin per tablet  Commonly known as:  THERAGRAN  Take 1 tablet by  mouth once daily.     ondansetron 8 MG Tbdl  Commonly known as:  ZOFRAN-ODT  Take 1 tablet (8 mg total) by mouth every 8 (eight) hours as needed.     polyethylene glycol 17 gram Pwpk  Commonly known as:  GLYCOLAX  Take 17 g by mouth daily as needed (Constipation).     vancomycin in dextrose 5 % 2 gram/500 mL Soln  Inject 500 mLs (2,000 mg total) into the vein once daily. Vanc 2000 mg Q 24 at 2000. Vanc trough needs to be drawn on 04/25 at 1930, 30 min before the 4th dose. End on 5/10. for 18 days         * This list has 2 medication(s) that are the same as other medications prescribed for you. Read the directions carefully, and ask your doctor or other care provider to review them with you.            CHANGE how you take these medications    LANTUS U-100 INSULIN 100 unit/mL injection  Generic drug:  insulin glargine  Inject 22 Units into the skin 2 (two) times daily.  What changed:  how much to take        CONTINUE taking these medications    atorvastatin 20 MG tablet  Commonly known as:  LIPITOR  Take 20 mg by mouth once daily.     oxyCODONE-acetaminophen 5-325 mg per tablet  Commonly known as:  PERCOCET  Take 1 tablet by mouth every 4 (four) hours as needed for Pain.        STOP taking these medications    nicotine 21 mg/24 hr  Commonly known as:  NICODERM CQ            Indwelling Lines/Drains at time of discharge:   Lines/Drains/Airways     None                 Time spent on the discharge of patient: 20 minutes  Patient was seen and examined on the date of discharge and determined to be suitable for discharge.         Faviola Rocha MD  Department of Hospital Medicine  [facility]

## 2020-04-22 NOTE — PLAN OF CARE
Spoke with pt via phone.  He is agreeable to transfer to LTAC today.     Pt has been accepted and auth given to [facility] ([address] [place], [place]).    Room [address]  Call report to [phone].   [facility] will provide pt 7pm dose of Vanc.     ADT30 placed, requested WC van transportation to arrive by 4:30 pm.      Dietary notified of pt transfer, requested dinner to be delivered early per patient request.  Nursing notified.      Pt titoSubha notified of transfer per pt request. [phone].  She will pass information along to other family members.    All information added to AVS.  [facility]   All pt questions answered, pt verbalizes understanding.    No further DC needs from CM perspective.       04/22/20 1512   Final Note   Assessment Type Final Discharge Note   Anticipated Discharge Disposition LTAC   What phone number can be called within the next 1-3 days to see how you are doing after discharge? [phone]   Hospital Follow Up  Appt(s) scheduled? Yes   Discharge plans and expectations educations in teach back method with documentation complete? Yes   Right Care Referral Info   Post Acute Recommendation Other   Post-Acute Status   Post-Acute Authorization Placement   Post-Acute Placement Status Set-up Complete

## 2020-04-22 NOTE — ASSESSMENT & PLAN NOTE
-reports daily anxiety, requiring Vistaril t.i.d. while inpatient  -also contributing to substance abuse  -started Cymbalta 60 daily d/t mood disorder and chronic pain.  Discussed side effects.  Might take 4-6 weeks for medication to take effect but can titrate in about 1 week, 4-27  -advised to follow-up with outpatient PCP to continue medication titration

## 2020-04-22 NOTE — PROGRESS NOTES
"[facility] Hospital Medicine  Progress Note    Patient Name: Cali Mabry  MRN: [id number]  Patient Class: IP- Inpatient   Admission Date: 3/26/2020  Length of Stay: 26 days  Attending Physician: Jaycee Dickinson MD  Primary Care Provider: Cullen Zimmer MD        Subjective:     Principal Problem:Bacterial pneumonia        HPI:  Cali Mabry is a 54 y.o. with DM II, hepatitis C, HTN, polysubstance abuse/IVDU, pancreatitis who presents, after transfer from [facility], with a 2 day history of chest pain and shortness of breath.  Chest pain is centralized and just described as "painful." Pain aggravated with movement and deep breathing. He reports an intermittent cough that is non-productive. He denies sick contacts, fever, chills, sore throat. Upon questioning, patient stated "I have the virus so just treat me for it." Educated patient that testing is still in process. Inquired about IV drug use however he was unable to recall when he last used. Patient swatted examiner's hand after chest palpation, yelling "that hurts!" Patient became aggravated, reported his pain was now worse and requested pain medication. Per nurse, patient given PRN dose an hour prior. Patient encouraged to continue discussing his plan of care however stated, "I don't want to chat."     ED: Afebrile with leukocytosis. CTA chest from 3/26 with dense consolidative changes within the right lower lobe with internal cavitation containing air and fluid; Occlusion is seen of the right lower lobe bronchi which may be secondary to mucoid impaction or aspirated material; Additional multifocal opacities and cavitations.  Vancomycin and Zosyn administered. On 2L NC. VSS. Admitted to hospital medicine for further evaluation and COVID-19 rule out.      Overview/Hospital Course:  55 yo male with IVDU with Hep C, hx of MRSA, Pancreatitis, DM2 (DKA 2/2020), anxiety who presented as a transfer from [facility] for SOB and CP.    He " was initially admitted on 02/22 and treated for DKA d/t noncompliance with insulin.  Infectious workup revealed RLL pneumonia.  Initial blood culture on 02/22 with MRSA in 1 set, likely from IVDU.  He had persistent fever and leukocytosis during his admission.  Subsequent blood cultures no growth, final.  Respiratory cultures with MRSA, E coli, Klebsiella.  PICC line was removed and tip Cx neg.  He was on IV vanc, ceftriaxone, Zosyn due to hypoxemic respiratory failure and possible aspiration.  CXR with some improvement.  CT head, abd without signs of septic emboli.  TTE without vegetation.  He was unable to be discharged to LTAC to complete ABX due to IVDU.  He was discharged on 03/08 without additional ABX.    He then presented to the ED on 03/27 with CP, SOB.  Admitted to recent IV heroin relapse.  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  Labs revealed leukocytosis of 20, elevated procalcitonin.  Flu, COVID testing negative.   Respiratory panel with enterovirus, rhinovirus.  QuantiFERON gold intermediate.  AFBs neg.  He had persistent fevers, but repeat blood cultures have remained negative.  S/p chest tube placement on 03/30 and tPA x 6.  CT surgery determined that no VATS was needed.  His chest tube was removed on 04/02 and was weaned from 2 L nasal cannula to room air.  Pleural fluid cx with lactobacillus.  He completed Zosyn on 04/18 per ID recs.  Given suspected septic emboli -> lungs despite negative TTE, ID recommended IV vanc for 6 weeks (end date 05/10/2020) through his left PICC.  Plan is to repeat CT chest on 05/08 to monitor cavitary lesions.  If lung lesions are persistent, plan to switch to doxycycline p.o. 100 b.i.d until resolution of lung cavitations.  If patient leaves AMA, can offer linezolid, but would be  suboptimal treatment.  Social work is pursuing LTAC placement to complete course of IV antibiotics.    He was counseled on cessation of IVDU heroin and tobacco use.  He has HCV with high VL.  HIV NR.  He was instructed to follow-up with hepatology for treatment.  He declined outpatient referral to Addiction Medicine.    He has chronic LLE pain, neuropathy, which led to relapse of IV heroin.  Gabapentin 300 t.i.d. was started and he had improvement of pain with lidocaine patch and Percocet 10.  He also reports daily anxiety and symptoms of depression.  He denied episodes of vero.  Cymbalta 60 was started for chronic pain, anxiety, depression.  He was instructed to establish care with his new PCP for continued SSRI titration.    His DM 1 (diagnosed 40 years ago) is uncontrolled with A1c of 11 .  Levemir was gradually titrated to 23 b.i.d with NovoLog 8 t.i.d. A.c. His BG varied widely (60-400s) with hypoglycemia unawareness due to noncompliance with diet.  He was often eating outside food and soft drinks.  He declined speaking with diabetes education.  We are arranging for outpatient follow-up with endocrinology on the [facility].    He has a history of hyperlipidemia.  Lipitor was continued.  CT head showed stable remote infarct and no new infarct or septic emboli.    Interval History:  Was hypoglycemic to 69 yesterday afternoon and did not receive prandial insulin.  He ate his entire meal.  However, his bedtime BG was 411.  BG was 61 this morning which increased to 166 after eating breakfast.  Did not require additional glucose.  He was unaware/asymptomatic during hypoglycemia episodes.  He does not always like the food in the hospital and requests double portions.  He also brings in outside food such as cheetos and diet Coke.  Had a bowel movement yesterday after starting bowel regimen.    Review of Systems   Constitutional: Negative for activity change, appetite change and fever.   HENT: Negative for  rhinorrhea and sneezing.    Eyes: Negative for discharge and redness.   Respiratory: Negative for shortness of breath and wheezing.    Cardiovascular: Negative for chest pain and leg swelling.   Gastrointestinal: Negative for abdominal pain, constipation and nausea.   Genitourinary: Negative for difficulty urinating and dysuria.   Musculoskeletal: Positive for arthralgias. Negative for gait problem.   Skin: Negative for rash and wound.   Neurological: Negative for dizziness and light-headedness.   Hematological: Negative for adenopathy.   Psychiatric/Behavioral: Negative for confusion. The patient is not nervous/anxious.      Objective:     Vital Signs (Most Recent):  Temp: 97.7 °F (36.5 °C) (04/22/20 0808)  Pulse: 82 (04/22/20 0808)  Resp: 18 (04/22/20 0808)  BP: 111/62 (04/22/20 0808)  SpO2: 98 % (04/22/20 0808) Vital Signs (24h Range):  Temp:  [97.7 °F (36.5 °C)-98.9 °F (37.2 °C)] 97.7 °F (36.5 °C)  Pulse:  [] 82  Resp:  [18-20] 18  SpO2:  [96 %-98 %] 98 %  BP: (111-121)/(62-75) 111/62     Weight: 58.7 kg (129 lb 6.6 oz)  Body mass index is 19.68 kg/m².  No intake or output data in the 24 hours ending 04/22/20 0842   Physical Exam   Constitutional: He appears well-developed and well-nourished. No distress.   HENT:   Head: Normocephalic and atraumatic.   Mouth/Throat: Oropharynx is clear and moist.   Eyes: Conjunctivae and EOM are normal. Right eye exhibits no discharge. Left eye exhibits no discharge.   Neck: No tracheal deviation present.   Cardiovascular: Normal rate, regular rhythm and normal heart sounds.   No murmur heard.  Pulmonary/Chest: Effort normal. No stridor. No respiratory distress. He has no wheezes.   Bibasilar crackles, worse on the right side   Abdominal: Soft. Bowel sounds are normal. He exhibits no distension. There is no tenderness.   Musculoskeletal: He exhibits no edema or tenderness.   Neurological: He is alert. No cranial nerve deficit.   Skin: Skin is warm. No rash noted. He is not  diaphoretic.   Psychiatric: He has a normal mood and affect. His behavior is normal.       Significant Labs:   CBC:   Recent Labs   Lab 04/21/20  0420   WBC 6.90   HGB 9.9*   HCT 32.4*        CMP:   Recent Labs   Lab 04/21/20  0420   *   K 4.1      CO2 25   *   BUN 31*   CREATININE 1.3   CALCIUM 9.2   PROT 8.6*   ALBUMIN 3.0*   BILITOT 0.3   ALKPHOS 101   AST 86*   *   ANIONGAP 8   EGFRNONAA >60       Significant Imaging: No new imaging      Assessment/Plan:      * Bacterial pneumonia  - TTE negative, but patient with likely septic emboli /pneumonia from presumed endocarditis from IV drug use.  CT head without septic emboli  - Resp cx positive for MRSA, klebsiella, ecoli  - Pleural fluid/empyema s/p chest tube, tPA x 6. Cx with lactobacillus.  Did not require CT surgery.  Completed Zosyn on 04/18  - initial blood culture 2-22 with MRSA.  He has had subsequent negative Blood cultures since 2/23/20  - QuantiFERON gold intermediate, but AFBs neg so isolation precautions will removed  - plan to repeat CT chest 5/8.  Is CT chest shows persistent cavitary lesions, will plan on doxycycline 100 b.i.d. until resolution of lung lesions  - currently on IV vanc for 6 weeks through left PICC.  Anticipated end date 5/10  - CM looking into LTAC to complete IV antibiotics  - If patient leaves AMA, can offer linezolid, but would be suboptimal treatment.   - appreciate ID following.  Has outpatient follow-up with ID    Other insomnia  -on melatonin p.r.n.  -addressing mood disorder with Cymbalta 60  -also with poor sleep hygiene.  Could consider trazodone due to insomnia      Mild episode of recurrent major depressive disorder  -no history of vero  -see anxiety      YONAS (generalized anxiety disorder)  -reports daily anxiety, requiring Vistaril t.i.d. while inpatient  -also contributing to substance abuse  -started Cymbalta 60 daily d/t mood disorder and chronic pain.  Discussed side effects.  Might  take 4-6 weeks for medication to take effect but can titrate in about 1 week, 4-27  -advised to follow-up with outpatient PCP to continue medication titration      Transaminitis  -stable, mildly elevated  -CT abdomen without hepatic or biliary issues  -see HCV  -continue to monitor    Other emphysema  -seen on CT chest  -encouraged continued tobacco cessation      Pain of left lower extremity  -Likely chronic radiculopathy and neuropathy  -Continue neurontin 300 mg tid, lidoderm patch  -started Cymbalta for anxiety, depression and chronic pain    Parapneumonic effusion  -s/p chest tube, tPA x 6.  Chest tube has been removed.  Doing well on room air.  -See above       Septic embolism  -TTE negative but concern for endocarditis given multifocal pneumonia  -Continue antibiotics as above      Opioid abuse  -counseled on cessation  -he declined outpatient referral to addiction medicine  -Caution with pain control. On Percocet 10mg Q6H PRN.  Also started bowel regimen p.r.n.  -started Cymbalta to help with pain and anxiety, which are contributing to substance abuse      Tobacco abuse  -quit smoking 6 months ago.  Encouraged continued cessation.    Chronic hepatitis C  -high viral load. HIV NR  -counseled on cessation in order to start outpatient treatment for HCV  -ordered referral to hepatology Clinic upon discharge; they will call to schedule appointment    Intravenous drug abuse  -also +HCV  -counseled on cessation  -he declined outpatient referral to addiction medicine    Hyperlipidemia  - continue Lipitor 20  - has mild transaminitis, but < 3 x ULN      Anemia of chronic disease  -Hb 6.5 on 4/4/2020 s/p transfusion x 1 prbc  -EGD 3/5 with moderate chronic gastritis.  Completed Protonix daily for 1 month.  -No active bleeding  -ferritin 418, TIBC low, likely from MRSA/pneumonia  -stable      Essential hypertension  -BP controlled without antihypertensives  -continue to monitor    Type 1 diabetes mellitus with  hyperglycemia  - HgbA1c 11%   - BG varies widely 60-400s with hypoglycemia unawareness.  Uncontrolled BG d/t diet noncompliance. Has been bringing in outside food.    - decrease Levemir from 25 BID -> 22 BID.  Increase prandial insulin from 8->10 TID due to diet noncompliance  - SSI.  Monitor BG and continue to titrate insulin regimen  - he declined outpatient follow-up with endocrinology and wants to make his own appointment  - he refused speaking with diabetes education      VTE Risk Mitigation (From admission, onward)         Ordered     enoxaparin injection 40 mg  Daily      04/04/20 0800     IP VTE HIGH RISK PATIENT  Once      03/27/20 2371                      Faviola Rocha MD  Department of Hospital Medicine   [facility]

## 2020-04-22 NOTE — ASSESSMENT & PLAN NOTE
-high viral load. HIV NR  -counseled on cessation in order to start outpatient treatment for HCV  -ordered referral to hepatology Clinic upon discharge; they will call to schedule appointment

## 2020-04-22 NOTE — ASSESSMENT & PLAN NOTE
-counseled on cessation  -he declined outpatient referral to addiction medicine  -Caution with pain control. On Percocet 10mg Q6H PRN.  Also started bowel regimen p.r.n.  -started Cymbalta to help with pain and anxiety, which are contributing to substance abuse

## 2020-04-22 NOTE — PLAN OF CARE
Patient in no apparent distress. Sat's  97 % on room air. PRN treatments not needed . Will continue to monitor.

## 2020-04-22 NOTE — PROGRESS NOTES
Pharmacokinetic Assessment Follow Up: IV Vancomycin    Vancomycin serum concentration assessment(s):    The trough level was drawn correctly and can be used to guide therapy at this time. The measurement is below the desired definitive target range of 15 to 20 mcg/mL.    Vancomycin Regimen Plan:    Change regimen to Vancomycin 2000 mg IV every 24 hours with next serum trough concentration measured at 1930 prior to 4th dose on 4/25     Drug levels (last 3 results):  Recent Labs   Lab Result Units 04/20/20  1933   Vancomycin-Trough ug/mL 11.8       Pharmacy will continue to follow and monitor vancomycin.    Please contact pharmacy at extension [phone] for questions regarding this assessment.    Thank you for the consult,   RIGO BOWERS       Patient brief summary:  Cali Mabry is a 54 y.o. male initiated on antimicrobial therapy with IV Vancomycin for treatment of lower respiratory infection    The patient's current regimen is vancomycin 1500 mg IV Q24h    Drug Allergies:   Review of patient's allergies indicates:  No Known Allergies    Actual Body Weight:   58.7 kg    Renal Function:   Estimated Creatinine Clearance: 54.6 mL/min (based on SCr of 1.3 mg/dL).,     Dialysis Method (if applicable):  N/A    CBC (last 72 hours):  Recent Labs   Lab Result Units 04/21/20  0420   WBC K/uL 6.90   Hemoglobin g/dL 9.9*   Hematocrit % 32.4*   Platelets K/uL 283   Gran% % 59.8   Lymph% % 28.3   Mono% % 10.3   Eosinophil% % 0.9   Basophil% % 0.3   Differential Method  Automated       Metabolic Panel (last 72 hours):  Recent Labs   Lab Result Units 04/21/20  0420   Sodium mmol/L 135*   Potassium mmol/L 4.1   Chloride mmol/L 102   CO2 mmol/L 25   Glucose mg/dL 147*   BUN, Bld mg/dL 31*   Creatinine mg/dL 1.3   Albumin g/dL 3.0*   Total Bilirubin mg/dL 0.3   Alkaline Phosphatase U/L 101   AST U/L 86*   ALT U/L 126*       Vancomycin Administrations:  vancomycin given in the last 96 hours                     vancomycin 1.5 g in  dextrose 5 % 250 mL IVPB (ready to mix) (mg) 1,500 mg New Bag 04/21/20 1954     1,500 mg New Bag 04/20/20 2118     1,500 mg New Bag 04/19/20 2002     1,500 mg New Bag 04/18/20 2107                      Microbiologic Results:  Microbiology Results (last 7 days)       ** No results found for the last 168 hours. **

## 2020-04-22 NOTE — ASSESSMENT & PLAN NOTE
- TTE negative, but patient with likely septic emboli /pneumonia from presumed endocarditis from IV drug use.  CT head without septic emboli  - Resp cx positive for MRSA, klebsiella, ecoli  - Pleural fluid/empyema s/p chest tube, tPA x 6. Cx with lactobacillus.  Did not require CT surgery.  Completed Zosyn on 04/18  - initial blood culture 2-22 with MRSA.  He has had subsequent negative Blood cultures since 2/23/20  - QuantiFERON gold intermediate, but AFBs neg so isolation precautions will removed  - plan to repeat CT chest 5/8.  Is CT chest shows persistent cavitary lesions, will plan on doxycycline 100 b.i.d. until resolution of lung lesions  - currently on IV vanc for 6 weeks through left PICC.  Anticipated end date 5/10  - CM looking into LTAC to complete IV antibiotics  - If patient leaves AMA, can offer linezolid, but would be suboptimal treatment.   - appreciate ID following.  Has outpatient follow-up with ID

## 2020-04-22 NOTE — PLAN OF CARE
[facility]     Department of Hospital Medicine    LTAC ORDERS    04/22/2020    Admit to LTAC:  Regular Bed         Diagnoses:  Active Hospital Problems    Diagnosis  POA    *Bacterial pneumonia [J15.9]  Yes     Multifocal Bacterial pneumonia  Cavitary lung lesions  Parapneumonic effusion/ EMPYEMA       -Quantiferon gold indeterminate  -COVID testing 3/26/20 - NEGATIVE  -Dense consolidative changes are visualized within the posterior aspect of the right lower lobe.    -Internal cavitation with air and fluid seen, decreased in size from previous CT.    -Occlusion is seen of right lower lobe bronchi.  Airways are otherwise patent.    -Multifocal opacities and cavitations are seen within the lungs        Mild episode of recurrent major depressive disorder [F33.0]  Yes    Other insomnia [G47.09]  Yes    Other emphysema [J43.8]  Yes    Transaminitis [R74.0]  Yes    YONAS (generalized anxiety disorder) [F41.1]  Yes    Pain of left lower extremity [M79.605]  Yes    Parapneumonic effusion [J18.9, J91.8]  Yes    Septic embolism [I76]  Yes    Opioid abuse [F11.10]  Yes     Chronic    Hyperlipidemia [E78.5]  Yes     Chronic    Intravenous drug abuse [F19.10]  Yes     Chronic    Chronic hepatitis C [B18.2]  Yes     Chronic    Tobacco abuse [Z72.0]  Yes     Chronic    Type 1 diabetes mellitus with hyperglycemia [E10.65]  Yes    Essential hypertension [I10]  Yes     Chronic    Anemia of chronic disease [D63.8]  Yes      Resolved Hospital Problems    Diagnosis Date Resolved POA    Acute hypoxemic respiratory failure [J96.01] 04/19/2020 Yes    Weight loss, unintentional [R63.4] 04/18/2020 Clinically Undetermined    Iron deficiency anemia [D50.9] 04/20/2020 Yes     Chronic    Reactive thrombocytosis [R79.89] 04/18/2020 Yes    Chest pain [R07.9] 04/04/2020 Yes              RAMYA (acute kidney injury) [N17.9] 04/20/2020 No    Hyponatremia [E87.1] 04/18/2020 Yes    Anemia of chronic disease  [D63.8] 04/18/2020 Yes     Chronic       Patient is homebound due to:  Bacterial pneumonia requiring IV antibiotics    Allergies:Review of patient's allergies indicates:  No Known Allergies    Vitals:     Routine per LTAC    Diet:  Diabetic diet 2000 calories per day    Acitivities:   May ambulate independently    LABS:  Per facility protocol  -CBC, CMP every 48 hours  -Vanc trough needs to be drawn on 04/25 at 1930, 30 min before the 4th dose  -CT chest with contrast needs to be performed on 05/08 to monitor cavitary lesions.  If lung lesions are persistent, plan to switch to doxycycline p.o. 100 b.i.d until resolution of lung cavitations.      Nursing Precautions:  Per facility protocol    CONSULTS: none     MISCELLANEOUS CARE:   PICC line care per protocol       DIABETES CARE:      Check blood sugar:   Fingerstick blood sugar AC and HS      Report CBG < 60 or > 400 to physician.                                          Insulin Sliding Scale          Glucose  Novolog Insulin Subcutaneous        0 - 60   Orange juice or glucose tablet, hold insulin      No insulin   201-250  2 units   251-300  4 units   301-350  6 units   351-400  8 units   >400   10 units then call physician      Medications: Discontinue all previous medication orders, if any. See new list below.   -Vancomycin IV 2000 mg Q 24 at 2000.  End date 5/10  -Vanc trough needs to be drawn on 04/25 at 1930, 30 min before the 4th dose  -CT chest with contrast needs to be performed on 05/08 to monitor cavitary lesions.  If lung lesions are persistent, plan to switch to doxycycline p.o. 100 b.i.d until resolution of lung cavitations.       Domenic Mabry   Home Medication Instructions ZINA:[id number]    Printed on:04/22/20 9621   Medication Information                      atorvastatin (LIPITOR) 20 MG tablet  Take 20 mg by mouth once daily.             blood sugar diagnostic Strp  Use To check blood glucose three times daily,              blood-glucose meter Misc  To check Blood glucose three times daily,             calcium carbonate (TUMS) 200 mg calcium (500 mg) chewable tablet  Take 1 tablet (500 mg total) by mouth daily as needed for Heartburn.             docusate sodium (COLACE) 100 MG capsule  Take 1 capsule (100 mg total) by mouth 2 (two) times daily as needed for Constipation.             DULoxetine (CYMBALTA) 60 MG capsule  Take 1 capsule (60 mg total) by mouth once daily.             folic acid-vit B6-vit B12 2.5-25-2 mg (FOLBIC OR EQUIV) 2.5-25-2 mg Tab  Take 1 tablet by mouth once daily.             gabapentin (NEURONTIN) 300 MG capsule  Take 1 capsule (300 mg total) by mouth 3 (three) times daily.             hydroxyzine HCL (ATARAX) 25 MG tablet  Take 1 tablet (25 mg total) by mouth 3 (three) times daily as needed for Anxiety.             insulin lispro 100 unit/mL injection  Inject 10 Units into the skin 3 (three) times daily before meals.             lancets Misc  To check BG 3 times daily, to use with insurance preferred meter             LANTUS U-100 INSULIN 100 unit/mL injection  Inject 22 Units into the skin 2 (two) times daily.             lidocaine (LIDODERM) 5 %  Place 1 patch onto the skin once daily. Remove & Discard patch within 12 hours or as directed by MD.  Apply to shoulder for pain             lidocaine (LIDODERM) 5 %  Place 1 patch onto the skin daily as needed. Remove & Discard patch within 12 hours or as directed by MD.  Apply to back for pain             melatonin (MELATIN) 3 mg tablet  Take 2 tablets (6 mg total) by mouth nightly as needed for Insomnia.             multivitamin (THERAGRAN) per tablet  Take 1 tablet by mouth once daily.             ondansetron (ZOFRAN-ODT) 8 MG TbDL  Take 1 tablet (8 mg total) by mouth every 8 (eight) hours as needed.             oxyCODONE-acetaminophen (PERCOCET) 5-325 mg per tablet  Take 1 tablet by mouth every 4 (four) hours as needed for Pain.             polyethylene glycol  (GLYCOLAX) 17 gram PwPk  Take 17 g by mouth daily as needed (Constipation).             vancomycin HCl in 5 % dextrose (VANCOMYCIN IN DEXTROSE 5 %) 2 gram/500 mL Soln  Inject 500 mLs (2,000 mg total) into the vein once daily. Vanc 2000 mg Q 24 at 2000. Vanc trough needs to be drawn on 04/25 at 1930, 30 min before the 4th dose. End on 5/10. for 18 days                     _________________________________  Faviola Rocha MD  04/22/2020

## 2020-04-23 PROBLEM — F19.10 POLYSUBSTANCE ABUSE: Status: ACTIVE | Noted: 2020-04-23

## 2020-04-23 PROBLEM — E44.0 MODERATE PROTEIN MALNUTRITION: Status: ACTIVE | Noted: 2020-04-23

## 2020-04-23 NOTE — NURSING
Pt discharged to transport services in wheelchair at 2033. Midline intact, lidocaine patches removed, pain medication administered. Pt in no distress, belongings with patient.

## 2020-04-24 PROBLEM — J98.4 CAVITARY PNEUMONIA: Status: ACTIVE | Noted: 2020-04-24

## 2020-04-24 PROBLEM — J18.9 CAVITARY PNEUMONIA: Status: ACTIVE | Noted: 2020-04-24

## 2020-04-27 PROBLEM — G47.00 INSOMNIA: Status: ACTIVE | Noted: 2020-04-21

## 2020-04-27 LAB — FUNGUS SPEC CULT: NORMAL

## 2020-04-28 ENCOUNTER — DOCUMENT SCAN (OUTPATIENT)
Dept: HOME HEALTH SERVICES | Facility: HOSPITAL | Age: 55
End: 2020-04-28
Payer: MEDICARE

## 2020-04-29 PROBLEM — R73.9 HYPERGLYCEMIA: Status: ACTIVE | Noted: 2020-04-29

## 2020-04-30 PROBLEM — R45.1 AGITATION: Status: ACTIVE | Noted: 2020-04-30

## 2020-05-09 ENCOUNTER — HOSPITAL ENCOUNTER (OUTPATIENT)
Facility: HOSPITAL | Age: 55
Discharge: HOME | End: 2020-05-11
Attending: EMERGENCY MEDICINE | Admitting: FAMILY MEDICINE
Payer: MEDICARE

## 2020-05-09 DIAGNOSIS — S32.010A CLOSED COMPRESSION FRACTURE OF BODY OF L1 VERTEBRA: ICD-10-CM

## 2020-05-09 DIAGNOSIS — R07.9 CHEST PAIN, UNSPECIFIED TYPE: Primary | ICD-10-CM

## 2020-05-09 DIAGNOSIS — M54.50 LOW BACK PAIN, UNSPECIFIED BACK PAIN LATERALITY, UNSPECIFIED CHRONICITY, UNSPECIFIED WHETHER SCIATICA PRESENT: ICD-10-CM

## 2020-05-09 PROBLEM — E16.2 HYPOGLYCEMIA: Status: ACTIVE | Noted: 2020-05-09

## 2020-05-09 LAB
ALBUMIN SERPL BCP-MCNC: 3.5 G/DL (ref 3.5–5.2)
ALP SERPL-CCNC: 107 U/L (ref 55–135)
ALT SERPL W/O P-5'-P-CCNC: 102 U/L (ref 10–44)
AMPHET+METHAMPHET UR QL: NEGATIVE
ANION GAP SERPL CALC-SCNC: 8 MMOL/L (ref 8–16)
AST SERPL-CCNC: 30 U/L (ref 10–40)
BARBITURATES UR QL SCN>200 NG/ML: NEGATIVE
BASOPHILS # BLD AUTO: 0.01 K/UL (ref 0–0.2)
BASOPHILS NFR BLD: 0.2 % (ref 0–1.9)
BENZODIAZ UR QL SCN>200 NG/ML: NORMAL
BILIRUB SERPL-MCNC: 0.8 MG/DL (ref 0.1–1)
BILIRUB UR QL STRIP: NEGATIVE
BNP SERPL-MCNC: 33 PG/ML (ref 0–99)
BUN SERPL-MCNC: 32 MG/DL (ref 6–20)
BZE UR QL SCN: NEGATIVE
CALCIUM SERPL-MCNC: 8.7 MG/DL (ref 8.7–10.5)
CANNABINOIDS UR QL SCN: NEGATIVE
CHLORIDE SERPL-SCNC: 101 MMOL/L (ref 95–110)
CLARITY UR: CLEAR
CO2 SERPL-SCNC: 27 MMOL/L (ref 23–29)
COLOR UR: YELLOW
CREAT SERPL-MCNC: 1.2 MG/DL (ref 0.5–1.4)
CREAT UR-MCNC: 27 MG/DL (ref 23–375)
DIFFERENTIAL METHOD: ABNORMAL
EOSINOPHIL # BLD AUTO: 0.1 K/UL (ref 0–0.5)
EOSINOPHIL NFR BLD: 0.8 % (ref 0–8)
ERYTHROCYTE [DISTWIDTH] IN BLOOD BY AUTOMATED COUNT: 17.4 % (ref 11.5–14.5)
EST. GFR  (AFRICAN AMERICAN): >60 ML/MIN/1.73 M^2
EST. GFR  (NON AFRICAN AMERICAN): >60 ML/MIN/1.73 M^2
GLUCOSE SERPL-MCNC: 125 MG/DL (ref 70–110)
GLUCOSE SERPL-MCNC: 126 MG/DL (ref 70–110)
GLUCOSE SERPL-MCNC: 216 MG/DL (ref 70–110)
GLUCOSE SERPL-MCNC: 31 MG/DL (ref 70–110)
GLUCOSE UR QL STRIP: NEGATIVE
HCT VFR BLD AUTO: 30.7 % (ref 40–54)
HGB BLD-MCNC: 9.2 G/DL (ref 14–18)
HGB UR QL STRIP: NEGATIVE
IMM GRANULOCYTES # BLD AUTO: 0.01 K/UL (ref 0–0.04)
IMM GRANULOCYTES NFR BLD AUTO: 0.2 % (ref 0–0.5)
INR PPP: 1
KETONES UR QL STRIP: NEGATIVE
LEUKOCYTE ESTERASE UR QL STRIP: NEGATIVE
LYMPHOCYTES # BLD AUTO: 1.9 K/UL (ref 1–4.8)
LYMPHOCYTES NFR BLD: 31.7 % (ref 18–48)
MAGNESIUM SERPL-MCNC: 2.1 MG/DL (ref 1.6–2.6)
MCH RBC QN AUTO: 26.9 PG (ref 27–31)
MCHC RBC AUTO-ENTMCNC: 30 G/DL (ref 32–36)
MCV RBC AUTO: 90 FL (ref 82–98)
MONOCYTES # BLD AUTO: 0.7 K/UL (ref 0.3–1)
MONOCYTES NFR BLD: 11.9 % (ref 4–15)
NEUTROPHILS # BLD AUTO: 3.3 K/UL (ref 1.8–7.7)
NEUTROPHILS NFR BLD: 55.2 % (ref 38–73)
NITRITE UR QL STRIP: NEGATIVE
NRBC BLD-RTO: 0 /100 WBC
OPIATES UR QL SCN: NORMAL
PCP UR QL SCN>25 NG/ML: NEGATIVE
PH UR STRIP: 6 [PH] (ref 5–8)
PLATELET # BLD AUTO: 250 K/UL (ref 150–350)
PMV BLD AUTO: 10.5 FL (ref 9.2–12.9)
POTASSIUM SERPL-SCNC: 4 MMOL/L (ref 3.5–5.1)
PROT SERPL-MCNC: 8.1 G/DL (ref 6–8.4)
PROT UR QL STRIP: NEGATIVE
PROTHROMBIN TIME: 12.8 SEC (ref 10.6–14.8)
RBC # BLD AUTO: 3.42 M/UL (ref 4.6–6.2)
SARS-COV-2 RDRP RESP QL NAA+PROBE: NEGATIVE
SODIUM SERPL-SCNC: 136 MMOL/L (ref 136–145)
SP GR UR STRIP: 1.02 (ref 1–1.03)
TOXICOLOGY INFORMATION: NORMAL
TROPONIN I SERPL DL<=0.01 NG/ML-MCNC: <0.03 NG/ML
URN SPEC COLLECT METH UR: NORMAL
UROBILINOGEN UR STRIP-ACNC: NEGATIVE EU/DL
VANCOMYCIN SERPL-MCNC: 17.7 UG/ML
WBC # BLD AUTO: 5.97 K/UL (ref 3.9–12.7)

## 2020-05-09 PROCEDURE — 85610 PROTHROMBIN TIME: CPT

## 2020-05-09 PROCEDURE — 80202 ASSAY OF VANCOMYCIN: CPT

## 2020-05-09 PROCEDURE — G0378 HOSPITAL OBSERVATION PER HR: HCPCS

## 2020-05-09 PROCEDURE — 99285 EMERGENCY DEPT VISIT HI MDM: CPT | Mod: 25

## 2020-05-09 PROCEDURE — 82962 GLUCOSE BLOOD TEST: CPT | Mod: 59

## 2020-05-09 PROCEDURE — 63600175 PHARM REV CODE 636 W HCPCS: Performed by: FAMILY MEDICINE

## 2020-05-09 PROCEDURE — 36415 COLL VENOUS BLD VENIPUNCTURE: CPT

## 2020-05-09 PROCEDURE — 25000003 PHARM REV CODE 250: Performed by: NURSE PRACTITIONER

## 2020-05-09 PROCEDURE — 80307 DRUG TEST PRSMV CHEM ANLYZR: CPT

## 2020-05-09 PROCEDURE — 84484 ASSAY OF TROPONIN QUANT: CPT

## 2020-05-09 PROCEDURE — 96375 TX/PRO/DX INJ NEW DRUG ADDON: CPT

## 2020-05-09 PROCEDURE — 85025 COMPLETE CBC W/AUTO DIFF WBC: CPT

## 2020-05-09 PROCEDURE — 96374 THER/PROPH/DIAG INJ IV PUSH: CPT

## 2020-05-09 PROCEDURE — 80053 COMPREHEN METABOLIC PANEL: CPT

## 2020-05-09 PROCEDURE — U0002 COVID-19 LAB TEST NON-CDC: HCPCS

## 2020-05-09 PROCEDURE — 25500020 PHARM REV CODE 255: Performed by: EMERGENCY MEDICINE

## 2020-05-09 PROCEDURE — 25000003 PHARM REV CODE 250: Performed by: FAMILY MEDICINE

## 2020-05-09 PROCEDURE — 83880 ASSAY OF NATRIURETIC PEPTIDE: CPT

## 2020-05-09 PROCEDURE — 25000003 PHARM REV CODE 250: Performed by: EMERGENCY MEDICINE

## 2020-05-09 PROCEDURE — 83735 ASSAY OF MAGNESIUM: CPT

## 2020-05-09 PROCEDURE — 93005 ELECTROCARDIOGRAM TRACING: CPT | Performed by: INTERNAL MEDICINE

## 2020-05-09 PROCEDURE — 96372 THER/PROPH/DIAG INJ SC/IM: CPT | Mod: 59

## 2020-05-09 PROCEDURE — 99900035 HC TECH TIME PER 15 MIN (STAT)

## 2020-05-09 PROCEDURE — C9399 UNCLASSIFIED DRUGS OR BIOLOG: HCPCS | Performed by: FAMILY MEDICINE

## 2020-05-09 PROCEDURE — 81003 URINALYSIS AUTO W/O SCOPE: CPT | Mod: 59

## 2020-05-09 PROCEDURE — 84484 ASSAY OF TROPONIN QUANT: CPT | Mod: 91

## 2020-05-09 RX ORDER — DEXTROMETHORPHAN HYDROBROMIDE, GUAIFENESIN 5; 100 MG/5ML; MG/5ML
650 LIQUID ORAL EVERY 8 HOURS PRN
Status: ON HOLD | COMMUNITY
End: 2022-03-09 | Stop reason: CLARIF

## 2020-05-09 RX ORDER — MAGNESIUM SULFATE HEPTAHYDRATE 40 MG/ML
2 INJECTION, SOLUTION INTRAVENOUS
Status: DISCONTINUED | OUTPATIENT
Start: 2020-05-09 | End: 2020-05-11 | Stop reason: HOSPADM

## 2020-05-09 RX ORDER — POTASSIUM CHLORIDE 20 MEQ/1
20 TABLET, EXTENDED RELEASE ORAL
Status: DISCONTINUED | OUTPATIENT
Start: 2020-05-09 | End: 2020-05-11 | Stop reason: HOSPADM

## 2020-05-09 RX ORDER — AMOXICILLIN 250 MG
1 CAPSULE ORAL 2 TIMES DAILY
Status: DISCONTINUED | OUTPATIENT
Start: 2020-05-09 | End: 2020-05-11 | Stop reason: HOSPADM

## 2020-05-09 RX ORDER — FOLIC ACID 0.8 MG
800 TABLET ORAL DAILY
Status: ON HOLD | COMMUNITY
End: 2020-09-28 | Stop reason: HOSPADM

## 2020-05-09 RX ORDER — MORPHINE SULFATE 2 MG/ML
2 INJECTION, SOLUTION INTRAMUSCULAR; INTRAVENOUS ONCE
Status: COMPLETED | OUTPATIENT
Start: 2020-05-09 | End: 2020-05-09

## 2020-05-09 RX ORDER — BISACODYL 10 MG
10 SUPPOSITORY, RECTAL RECTAL DAILY PRN
Status: ON HOLD | COMMUNITY
End: 2022-09-20

## 2020-05-09 RX ORDER — LANOLIN ALCOHOL/MO/W.PET/CERES
800 CREAM (GRAM) TOPICAL
Status: DISCONTINUED | OUTPATIENT
Start: 2020-05-09 | End: 2020-05-11 | Stop reason: HOSPADM

## 2020-05-09 RX ORDER — CALCIUM CHLORIDE IN 0.9 % NACL 1 G/100 ML
1 INTRAVENOUS SOLUTION, PIGGYBACK (ML) INTRAVENOUS
Status: DISCONTINUED | OUTPATIENT
Start: 2020-05-09 | End: 2020-05-11 | Stop reason: HOSPADM

## 2020-05-09 RX ORDER — ASCORBIC ACID 500 MG
500 TABLET ORAL DAILY
Status: DISCONTINUED | OUTPATIENT
Start: 2020-05-10 | End: 2020-05-11 | Stop reason: HOSPADM

## 2020-05-09 RX ORDER — ACETAMINOPHEN 325 MG/1
650 TABLET ORAL EVERY 4 HOURS PRN
Status: DISCONTINUED | OUTPATIENT
Start: 2020-05-09 | End: 2020-05-11 | Stop reason: HOSPADM

## 2020-05-09 RX ORDER — DEXTROMETHORPHAN POLISTIREX 30 MG/5 ML
1 SUSPENSION, EXTENDED RELEASE 12 HR ORAL DAILY PRN
Status: ON HOLD | COMMUNITY
End: 2022-09-20

## 2020-05-09 RX ORDER — DIVALPROEX SODIUM 125 MG/1
125 TABLET, DELAYED RELEASE ORAL EVERY 8 HOURS
Status: ON HOLD | COMMUNITY
End: 2022-03-09 | Stop reason: CLARIF

## 2020-05-09 RX ORDER — CALCIUM CARBONATE 200(500)MG
500 TABLET,CHEWABLE ORAL DAILY PRN
Status: DISCONTINUED | OUTPATIENT
Start: 2020-05-09 | End: 2020-05-11 | Stop reason: HOSPADM

## 2020-05-09 RX ORDER — SODIUM CHLORIDE 0.9 % (FLUSH) 0.9 %
10 SYRINGE (ML) INJECTION
Status: DISCONTINUED | OUTPATIENT
Start: 2020-05-09 | End: 2020-05-11 | Stop reason: HOSPADM

## 2020-05-09 RX ORDER — ASPIRIN 325 MG
325 TABLET ORAL
Status: COMPLETED | OUTPATIENT
Start: 2020-05-09 | End: 2020-05-09

## 2020-05-09 RX ORDER — BACLOFEN 10 MG/1
10 TABLET ORAL 3 TIMES DAILY
Status: DISCONTINUED | OUTPATIENT
Start: 2020-05-09 | End: 2020-05-11 | Stop reason: HOSPADM

## 2020-05-09 RX ORDER — IBUPROFEN 200 MG
16 TABLET ORAL
Status: DISCONTINUED | OUTPATIENT
Start: 2020-05-09 | End: 2020-05-11 | Stop reason: HOSPADM

## 2020-05-09 RX ORDER — TRAZODONE HYDROCHLORIDE 50 MG/1
25 TABLET ORAL NIGHTLY
Status: ON HOLD | COMMUNITY
End: 2022-09-20

## 2020-05-09 RX ORDER — QUETIAPINE FUMARATE 50 MG/1
50 TABLET, FILM COATED ORAL NIGHTLY
Status: ON HOLD | COMMUNITY
End: 2022-09-20

## 2020-05-09 RX ORDER — IBUPROFEN 200 MG
16 TABLET ORAL
Status: ON HOLD | COMMUNITY
End: 2022-09-20

## 2020-05-09 RX ORDER — INSULIN ASPART 100 [IU]/ML
1-10 INJECTION, SOLUTION INTRAVENOUS; SUBCUTANEOUS
COMMUNITY
End: 2021-05-03

## 2020-05-09 RX ORDER — LIDOCAINE 50 MG/G
1 PATCH TOPICAL DAILY PRN
Status: DISCONTINUED | OUTPATIENT
Start: 2020-05-09 | End: 2020-05-11 | Stop reason: HOSPADM

## 2020-05-09 RX ORDER — DIPHENHYDRAMINE HCL 25 MG
25 CAPSULE ORAL EVERY 6 HOURS PRN
Status: ON HOLD | COMMUNITY
End: 2022-09-20

## 2020-05-09 RX ORDER — IBUPROFEN 200 MG
24 TABLET ORAL
Status: DISCONTINUED | OUTPATIENT
Start: 2020-05-09 | End: 2020-05-11 | Stop reason: HOSPADM

## 2020-05-09 RX ORDER — POTASSIUM CHLORIDE 7.45 MG/ML
20 INJECTION INTRAVENOUS
Status: DISCONTINUED | OUTPATIENT
Start: 2020-05-09 | End: 2020-05-11 | Stop reason: HOSPADM

## 2020-05-09 RX ORDER — HALOPERIDOL 5 MG/ML
2 INJECTION INTRAMUSCULAR EVERY 4 HOURS PRN
Status: DISCONTINUED | OUTPATIENT
Start: 2020-05-09 | End: 2020-05-09

## 2020-05-09 RX ORDER — MAGNESIUM SULFATE HEPTAHYDRATE 40 MG/ML
4 INJECTION, SOLUTION INTRAVENOUS
Status: DISCONTINUED | OUTPATIENT
Start: 2020-05-09 | End: 2020-05-11 | Stop reason: HOSPADM

## 2020-05-09 RX ORDER — POLYETHYLENE GLYCOL 3350 17 G/17G
17 POWDER, FOR SOLUTION ORAL 3 TIMES DAILY PRN
Status: DISCONTINUED | OUTPATIENT
Start: 2020-05-09 | End: 2020-05-11 | Stop reason: HOSPADM

## 2020-05-09 RX ORDER — OXYCODONE HYDROCHLORIDE 5 MG/1
10 TABLET ORAL EVERY 6 HOURS PRN
Status: DISCONTINUED | OUTPATIENT
Start: 2020-05-09 | End: 2020-05-11 | Stop reason: HOSPADM

## 2020-05-09 RX ORDER — DOCUSATE SODIUM 100 MG/1
100 CAPSULE, LIQUID FILLED ORAL 2 TIMES DAILY PRN
Status: DISCONTINUED | OUTPATIENT
Start: 2020-05-09 | End: 2020-05-11 | Stop reason: HOSPADM

## 2020-05-09 RX ORDER — DIPHENHYDRAMINE HCL 25 MG
25 CAPSULE ORAL EVERY 6 HOURS PRN
Status: DISCONTINUED | OUTPATIENT
Start: 2020-05-09 | End: 2020-05-11 | Stop reason: HOSPADM

## 2020-05-09 RX ORDER — POTASSIUM CHLORIDE 7.45 MG/ML
40 INJECTION INTRAVENOUS
Status: DISCONTINUED | OUTPATIENT
Start: 2020-05-09 | End: 2020-05-11 | Stop reason: HOSPADM

## 2020-05-09 RX ORDER — TALC
6 POWDER (GRAM) TOPICAL NIGHTLY PRN
Status: DISCONTINUED | OUTPATIENT
Start: 2020-05-09 | End: 2020-05-11 | Stop reason: HOSPADM

## 2020-05-09 RX ORDER — OXYCODONE HYDROCHLORIDE 10 MG/1
10 TABLET ORAL EVERY 6 HOURS PRN
Status: ON HOLD | COMMUNITY
End: 2020-08-08 | Stop reason: ALTCHOICE

## 2020-05-09 RX ORDER — DIVALPROEX SODIUM 125 MG/1
125 CAPSULE, COATED PELLETS ORAL EVERY 8 HOURS
Status: DISCONTINUED | OUTPATIENT
Start: 2020-05-09 | End: 2020-05-11 | Stop reason: HOSPADM

## 2020-05-09 RX ORDER — HYDROXYZINE HYDROCHLORIDE 25 MG/1
25 TABLET, FILM COATED ORAL 3 TIMES DAILY PRN
Status: DISCONTINUED | OUTPATIENT
Start: 2020-05-09 | End: 2020-05-11 | Stop reason: HOSPADM

## 2020-05-09 RX ORDER — DULOXETIN HYDROCHLORIDE 30 MG/1
60 CAPSULE, DELAYED RELEASE ORAL DAILY
Status: DISCONTINUED | OUTPATIENT
Start: 2020-05-10 | End: 2020-05-11 | Stop reason: HOSPADM

## 2020-05-09 RX ORDER — BACLOFEN 10 MG/1
10 TABLET ORAL 3 TIMES DAILY
Status: ON HOLD | COMMUNITY
End: 2020-08-08 | Stop reason: ALTCHOICE

## 2020-05-09 RX ORDER — POTASSIUM CHLORIDE 20 MEQ/1
40 TABLET, EXTENDED RELEASE ORAL
Status: DISCONTINUED | OUTPATIENT
Start: 2020-05-09 | End: 2020-05-11 | Stop reason: HOSPADM

## 2020-05-09 RX ORDER — ALPRAZOLAM 0.5 MG/1
0.5 TABLET ORAL 2 TIMES DAILY PRN
Status: ON HOLD | COMMUNITY
End: 2020-08-08 | Stop reason: ALTCHOICE

## 2020-05-09 RX ORDER — INSULIN ASPART 100 [IU]/ML
0-5 INJECTION, SOLUTION INTRAVENOUS; SUBCUTANEOUS
Status: DISCONTINUED | OUTPATIENT
Start: 2020-05-09 | End: 2020-05-10

## 2020-05-09 RX ORDER — HALOPERIDOL 5 MG/ML
2 INJECTION INTRAMUSCULAR EVERY 4 HOURS PRN
Status: DISCONTINUED | OUTPATIENT
Start: 2020-05-09 | End: 2020-05-11 | Stop reason: HOSPADM

## 2020-05-09 RX ORDER — ASPIRIN 325 MG
325 TABLET, DELAYED RELEASE (ENTERIC COATED) ORAL DAILY
Status: DISCONTINUED | OUTPATIENT
Start: 2020-05-09 | End: 2020-05-11 | Stop reason: HOSPADM

## 2020-05-09 RX ORDER — FOLIC ACID 1 MG/1
800 TABLET ORAL DAILY
Status: DISCONTINUED | OUTPATIENT
Start: 2020-05-10 | End: 2020-05-11 | Stop reason: HOSPADM

## 2020-05-09 RX ORDER — ATORVASTATIN CALCIUM 20 MG/1
20 TABLET, FILM COATED ORAL DAILY
Status: DISCONTINUED | OUTPATIENT
Start: 2020-05-09 | End: 2020-05-11 | Stop reason: HOSPADM

## 2020-05-09 RX ORDER — QUETIAPINE FUMARATE 25 MG/1
50 TABLET, FILM COATED ORAL NIGHTLY
Status: DISCONTINUED | OUTPATIENT
Start: 2020-05-09 | End: 2020-05-11 | Stop reason: HOSPADM

## 2020-05-09 RX ORDER — ALPRAZOLAM 0.5 MG/1
0.5 TABLET ORAL 2 TIMES DAILY PRN
Status: DISCONTINUED | OUTPATIENT
Start: 2020-05-09 | End: 2020-05-11 | Stop reason: HOSPADM

## 2020-05-09 RX ORDER — MAGNESIUM SULFATE 1 G/100ML
1 INJECTION INTRAVENOUS
Status: DISCONTINUED | OUTPATIENT
Start: 2020-05-09 | End: 2020-05-11 | Stop reason: HOSPADM

## 2020-05-09 RX ORDER — GABAPENTIN 300 MG/1
300 CAPSULE ORAL 3 TIMES DAILY
Status: DISCONTINUED | OUTPATIENT
Start: 2020-05-09 | End: 2020-05-10

## 2020-05-09 RX ORDER — ONDANSETRON 2 MG/ML
4 INJECTION INTRAMUSCULAR; INTRAVENOUS EVERY 6 HOURS PRN
COMMUNITY
End: 2021-09-16

## 2020-05-09 RX ORDER — IPRATROPIUM BROMIDE AND ALBUTEROL SULFATE 2.5; .5 MG/3ML; MG/3ML
3 SOLUTION RESPIRATORY (INHALATION) EVERY 4 HOURS PRN
Status: ON HOLD | COMMUNITY
End: 2020-08-08 | Stop reason: SDUPTHER

## 2020-05-09 RX ORDER — GLUCAGON 1 MG
1 KIT INJECTION
Status: DISCONTINUED | OUTPATIENT
Start: 2020-05-09 | End: 2020-05-11 | Stop reason: HOSPADM

## 2020-05-09 RX ORDER — IPRATROPIUM BROMIDE AND ALBUTEROL SULFATE 2.5; .5 MG/3ML; MG/3ML
3 SOLUTION RESPIRATORY (INHALATION) EVERY 4 HOURS PRN
Status: DISCONTINUED | OUTPATIENT
Start: 2020-05-09 | End: 2020-05-11 | Stop reason: HOSPADM

## 2020-05-09 RX ORDER — ONDANSETRON 2 MG/ML
4 INJECTION INTRAMUSCULAR; INTRAVENOUS EVERY 8 HOURS PRN
Status: DISCONTINUED | OUTPATIENT
Start: 2020-05-09 | End: 2020-05-11 | Stop reason: HOSPADM

## 2020-05-09 RX ORDER — MULTIVIT WITH MINERALS/HERBS
1 TABLET ORAL DAILY
Status: ON HOLD | COMMUNITY
End: 2022-09-20

## 2020-05-09 RX ADMIN — GABAPENTIN 300 MG: 300 CAPSULE ORAL at 08:05

## 2020-05-09 RX ADMIN — SENNOSIDES AND DOCUSATE SODIUM 1 TABLET: 8.6; 5 TABLET ORAL at 08:05

## 2020-05-09 RX ADMIN — DIVALPROEX SODIUM 125 MG: 125 CAPSULE, COATED PELLETS ORAL at 10:05

## 2020-05-09 RX ADMIN — DEXTROSE MONOHYDRATE 25 G: 500 INJECTION PARENTERAL at 04:05

## 2020-05-09 RX ADMIN — ATORVASTATIN CALCIUM 20 MG: 20 TABLET, FILM COATED ORAL at 08:05

## 2020-05-09 RX ADMIN — ASPIRIN 325 MG ORAL TABLET 325 MG: 325 PILL ORAL at 01:05

## 2020-05-09 RX ADMIN — IOHEXOL 100 ML: 350 INJECTION, SOLUTION INTRAVENOUS at 02:05

## 2020-05-09 RX ADMIN — OXYCODONE HYDROCHLORIDE 10 MG: 5 TABLET ORAL at 06:05

## 2020-05-09 RX ADMIN — MORPHINE SULFATE 2 MG: 2 INJECTION, SOLUTION INTRAMUSCULAR; INTRAVENOUS at 10:05

## 2020-05-09 RX ADMIN — TRAZODONE HYDROCHLORIDE 25 MG: 50 TABLET ORAL at 08:05

## 2020-05-09 RX ADMIN — QUETIAPINE FUMARATE 50 MG: 25 TABLET, FILM COATED ORAL at 08:05

## 2020-05-09 RX ADMIN — INSULIN DETEMIR 25 UNITS: 100 INJECTION, SOLUTION SUBCUTANEOUS at 10:05

## 2020-05-09 RX ADMIN — BACLOFEN 10 MG: 10 TABLET ORAL at 08:05

## 2020-05-09 RX ADMIN — ALPRAZOLAM 0.5 MG: 0.5 TABLET ORAL at 08:05

## 2020-05-09 RX ADMIN — VANCOMYCIN HYDROCHLORIDE 1500 MG: 1 INJECTION, POWDER, LYOPHILIZED, FOR SOLUTION INTRAVENOUS at 10:05

## 2020-05-09 NOTE — ASSESSMENT & PLAN NOTE
Admit to cardiology B   Trend troponins - first negative   ASA   Currently denying pain   Echo on 3/29 - see study

## 2020-05-09 NOTE — SUBJECTIVE & OBJECTIVE
Past Medical History:   Diagnosis Date    Acute on chronic pancreatitis 4/3/2016    DKA (diabetic ketoacidoses) 02/2020    DM (diabetes mellitus), type 1     HCV (hepatitis C virus)     high VL     HTN (hypertension)     IVDU (intravenous drug user)     Heroin    Pancreatitis        Past Surgical History:   Procedure Laterality Date    CARDIAC SURGERY  1999    stent placed. ([facility])    ESOPHAGOGASTRODUODENOSCOPY N/A 3/5/2020    Gastritis.  No H pylori.  Completed PPI for 1 month.  Procedure: EGD (ESOPHAGOGASTRODUODENOSCOPY);  Surgeon: Brinda Rene MD;  Location: [facility];  Service: Endoscopy;  Laterality: N/A;  W421 A; [id number]    SHOULDER SURGERY  2013    right shoulder, spur removal.       Review of patient's allergies indicates:  No Known Allergies    Current Facility-Administered Medications on File Prior to Encounter   Medication    [MAR Hold - Suspended Admission] acetaminophen tablet 650 mg    [MAR Hold - Suspended Admission] albuterol-ipratropium 2.5 mg-0.5 mg/3 mL nebulizer solution 3 mL    [MAR Hold - Suspended Admission] ALPRAZolam tablet 0.5 mg    [MAR Hold - Suspended Admission] atorvastatin tablet 20 mg    [MAR Hold - Suspended Admission] B complex-vitamin C-folic acid 0.8 mg Tab 0.8 mg    [MAR Hold - Suspended Admission] baclofen tablet 10 mg    [MAR Hold - Suspended Admission] bisacodyL suppository 10 mg    [MAR Hold - Suspended Admission] calcium carbonate 200 mg calcium (500 mg) chewable tablet 500 mg    [MAR Hold - Suspended Admission] dextrose 10 % infusion    [MAR Hold - Suspended Admission] dextrose 10 % infusion    [MAR Hold - Suspended Admission] dextrose 50% injection 12.5 g    [MAR Hold - Suspended Admission] dextrose 50% injection 12.5 g    [MAR Hold - Suspended Admission] dextrose 50% injection 25 g    [MAR Hold - Suspended Admission] dextrose 50% injection 25 g    [MAR Hold - Suspended Admission] diphenhydrAMINE capsule 25 mg    [MAR Hold -  Suspended Admission] divalproex capsule 125 mg    [MAR Hold - Suspended Admission] DULoxetine DR capsule 60 mg    [MAR Hold - Suspended Admission] gabapentin capsule 300 mg    [MAR Hold - Suspended Admission] glucagon (human recombinant) injection 1 mg    [MAR Hold - Suspended Admission] glucose chewable tablet 16 g    [MAR Hold - Suspended Admission] glucose chewable tablet 24 g    [MAR Hold - Suspended Admission] haloperidol lactate injection 1 mg    [MAR Hold - Suspended Admission] hydroxyzine HCL tablet 25 mg    [MAR Hold - Suspended Admission] insulin aspart U-100 pen 1-10 Units    [MAR Hold - Suspended Admission] insulin aspart U-100 pen 10 Units    [MAR Hold - Suspended Admission] insulin detemir U-100 pen 25 Units    [MAR Hold - Suspended Admission] L.acidophil,parac-S.therm-Bif. (Risaquad) Cap 1 capsule    [MAR Hold - Suspended Admission] melatonin tablet 6 mg    [MAR Hold - Suspended Admission] mineral oil enema 1 enema    [MAR Hold - Suspended Admission] morphine injection 1 mg    [MAR Hold - Suspended Admission] multivitamin tablet    [MAR Hold - Suspended Admission] ondansetron injection 4 mg    [MAR Hold - Suspended Admission] oxyCODONE immediate release tablet Tab 10 mg    [MAR Hold - Suspended Admission] polyethylene glycol packet 17 g    [MAR Hold - Suspended Admission] prochlorperazine injection Soln 5 mg    [MAR Hold - Suspended Admission] QUEtiapine tablet 50 mg    [COMPLETED] sodium chloride 0.9% bolus 500 mL    [MAR Hold - Suspended Admission] sodium chloride 0.9% flush 10 mL    [MAR Hold - Suspended Admission] sodium chloride 0.9% flush 10 mL    [MAR Hold - Suspended Admission] trazodone split tablet 25 mg    [MAR Hold - Suspended Admission] vancomycin - pharmacy to dose    [MAR Hold - Suspended Admission] vancomycin 1.5 g in  mL IVPB (ready to mix)     Current Outpatient Medications on File Prior to Encounter   Medication Sig    acetaminophen (TYLENOL) 650 MG  TbSR Take 650 mg by mouth every 8 (eight) hours as needed.    albuterol-ipratropium (DUO-NEB) 2.5 mg-0.5 mg/3 mL nebulizer solution Take 3 mLs by nebulization every 4 (four) hours as needed for Wheezing. Rescue    ALPRAZolam (XANAX) 0.5 MG tablet Take 0.5 mg by mouth 2 (two) times daily as needed for Anxiety.    ASCORBIC ACID, VITAMIN C, ORAL Take 1 tablet by mouth once daily.    atorvastatin (LIPITOR) 20 MG tablet Take 20 mg by mouth once daily.    b complex vitamins tablet Take 1 tablet by mouth once daily.    baclofen (LIORESAL) 10 MG tablet Take 10 mg by mouth 3 (three) times daily.    divalproex (DEPAKOTE) 125 MG EC tablet Take 125 mg by mouth every 8 (eight) hours.    DULoxetine (CYMBALTA) 60 MG capsule Take 1 capsule (60 mg total) by mouth once daily.    folic acid (FOLVITE) 800 MCG Tab Take 800 mcg by mouth once daily.    gabapentin (NEURONTIN) 300 MG capsule Take 1 capsule (300 mg total) by mouth 3 (three) times daily.    hydroxyzine HCL (ATARAX) 25 MG tablet Take 1 tablet (25 mg total) by mouth 3 (three) times daily as needed for Anxiety.    insulin aspart U-100 (NOVOLOG) 100 unit/mL injection Inject 1-10 Units into the skin 3 (three) times daily with meals.    insulin detemir U-100 (LEVEMIR) 100 unit/mL injection Inject 25 Units into the skin 2 (two) times a day.    L. acidophilus/Strept/La p-whitney (RISAQUAD ORAL) Take 1 capsule by mouth once daily.    melatonin (MELATIN) 3 mg tablet Take 2 tablets (6 mg total) by mouth nightly as needed for Insomnia.    multivitamin (THERAGRAN) per tablet Take 1 tablet by mouth once daily.    QUEtiapine (SEROQUEL) 50 MG tablet Take 50 mg by mouth every evening.    traZODone (DESYREL) 50 MG tablet Take 25 mg by mouth every evening.    bisacodyL (DULCOLAX) 10 mg Supp Place 10 mg rectally daily as needed.    blood sugar diagnostic Strp Use To check blood glucose three times daily,    blood-glucose meter Misc To check Blood glucose three times daily,     calcium carbonate (TUMS) 200 mg calcium (500 mg) chewable tablet Take 1 tablet (500 mg total) by mouth daily as needed for Heartburn.    diphenhydrAMINE (BENADRYL) 25 mg capsule Take 25 mg by mouth every 6 (six) hours as needed for Itching.    docusate sodium (COLACE) 100 MG capsule Take 1 capsule (100 mg total) by mouth 2 (two) times daily as needed for Constipation.    folic acid-vit B6-vit B12 2.5-25-2 mg (FOLBIC OR EQUIV) 2.5-25-2 mg Tab Take 1 tablet by mouth once daily.    glucagon, human recombinant, (GLUCAGEN HYPOKIT) 1 mg SolR Inject 1 mg into the muscle as needed.    glucose 4 GM chewable tablet Take 16 g by mouth as needed for Low blood sugar.    HALOPERIDOL LACTATE INJ Inject 1 mg as directed every 4 (four) hours as needed.    insulin lispro 100 unit/mL injection Inject 10 Units into the skin 3 (three) times daily before meals.    lancets Misc To check BG 3 times daily, to use with insurance preferred meter    LANTUS U-100 INSULIN 100 unit/mL injection Inject 22 Units into the skin 2 (two) times daily.    lidocaine (LIDODERM) 5 % Place 1 patch onto the skin once daily. Remove & Discard patch within 12 hours or as directed by MD.  Apply to shoulder for pain    lidocaine (LIDODERM) 5 % Place 1 patch onto the skin daily as needed. Remove & Discard patch within 12 hours or as directed by MD.  Apply to back for pain    mineral oil (FLEET OIL RETENTION) enema Place 1 enema rectally daily as needed for Constipation.    morphine sulfate (MORPHINE INJ) Inject 1 mg as directed every 8 (eight) hours as needed.    ondansetron (ZOFRAN) 2 mg/mL injection Inject 4 mg into the vein every 6 (six) hours as needed.    ondansetron (ZOFRAN-ODT) 8 MG TbDL Take 1 tablet (8 mg total) by mouth every 8 (eight) hours as needed.    oxyCODONE (ROXICODONE) 10 mg Tab immediate release tablet Take 10 mg by mouth every 6 (six) hours as needed for Pain.    oxyCODONE-acetaminophen (PERCOCET) 5-325 mg per tablet Take 1  "tablet by mouth every 4 (four) hours as needed for Pain.    polyethylene glycol (GLYCOLAX) 17 gram PwPk Take 17 g by mouth daily as needed (Constipation). (Patient taking differently: Take 17 g by mouth 3 (three) times daily as needed (Constipation). )    vancomycin HCl in 5 % dextrose (VANCOMYCIN IN DEXTROSE 5 %) 2 gram/500 mL Soln Inject 500 mLs (2,000 mg total) into the vein once daily. Vanc 2000 mg Q 24 at 2000. Vanc trough needs to be drawn on  at 1930, 30 min before the 4th dose. End on 5/10. for 18 days     Family History     Problem Relation (Age of Onset)    Asthma Mother        Tobacco Use    Smoking status: Former Smoker     Packs/day: 1.00     Years: 20.00     Pack years: 20.00     Types: Cigarettes     Start date: 4/3/1981     Last attempt to quit: 10/2019     Years since quittin.6    Smokeless tobacco: Never Used   Substance and Sexual Activity    Alcohol use: No     Alcohol/week: 0.0 standard drinks    Drug use: Yes     Types: IV, Marijuana, Heroin     Comment: currently, used today, herion    Sexual activity: Yes     Partners: Female     Review of Systems   Unable to perform ROS: Other    Pt is refusing to answer my questions, instead repeating "Im FINE" over and over when asked ROS questions.  Objective:     Vital Signs (Most Recent):  Temp: 97.7 °F (36.5 °C) (20 1344)  Pulse: (!) 115 (20 1600)  Resp: (!) 21 (20 1600)  BP: (!) 164/72 (20 1600)  SpO2: (!) 94 % (20 1600) Vital Signs (24h Range):  Temp:  [96.6 °F (35.9 °C)-97.7 °F (36.5 °C)] 97.7 °F (36.5 °C)  Pulse:  [] 115  Resp:  [15-29] 21  SpO2:  [94 %-99 %] 94 %  BP: (107-164)/() 164/72     Weight: 59 kg (130 lb)  Body mass index is 19.77 kg/m².    Physical Exam   Constitutional: He appears well-developed.   thin   HENT:   Head: Normocephalic and atraumatic.   Eyes: Pupils are equal, round, and reactive to light. Conjunctivae and EOM are normal.   Neck: Normal range of motion. Neck supple. "   Cardiovascular: Regular rhythm, normal heart sounds and intact distal pulses.   Tachycardia   Pulmonary/Chest: Effort normal and breath sounds normal.   Abdominal: Soft. Bowel sounds are normal.   Musculoskeletal: Normal range of motion. He exhibits edema.   bilat LE edema 1+   Neurological: He is alert.   Will not answer orientation questions   Skin: Skin is warm and dry. Capillary refill takes less than 2 seconds.   Psychiatric:   Yelling and cursing at me refusing to answer questions   Nursing note and vitals reviewed.        CRANIAL NERVES     CN III, IV, VI   Pupils are equal, round, and reactive to light.  Extraocular motions are normal.        Significant Labs:   CBC:   Recent Labs   Lab 05/08/20  0934 05/09/20  1404   WBC 6.93 5.97   HGB 11.1* 9.2*   HCT 38.6* 30.7*    250     CMP:   Recent Labs   Lab 05/08/20  0934 05/09/20  1404    136   K 4.3 4.0    101   CO2 26 27    126*   BUN 29* 32*   CREATININE 1.7* 1.2   CALCIUM 9.9 8.7   PROT 9.1* 8.1   ALBUMIN 3.5 3.5   BILITOT 0.4 0.8   ALKPHOS 140* 107   AST 42* 30   * 102*   ANIONGAP 14 8   EGFRNONAA 45* >60.0     Troponin:   Recent Labs   Lab 05/09/20  1404   TROPONINI <0.030       Significant Imaging: EKG: I have reviewed all pertinent results/findings within the past 24 hours and my personal findings are: sinus tachycardia no acute ischemic change

## 2020-05-09 NOTE — HPI
"Mr. Mabry presents today with complaints of chest pain. He is yelling at me over and over, "I'm FINE" and asked if he can go home because he "lives on the [facility]". Per ER MD he was complaining of sharp left chest pain radiating to the shoulder. He was not able or refused to answer my orientation questions. History is taken from notes at [facility]. He was "admitted on 02/22 and treated for DKA d/t noncompliance with insulin.  Infectious workup revealed RLL pneumonia.  Initial blood culture on 02/22 with MRSA in 1 set, likely from IVDU.  He had persistent fever and leukocytosis during his admission.  Subsequent blood cultures no growth, final.  Respiratory cultures with MRSA, E coli, Klebsiella.  PICC line was removed and tip Cx neg.  TTE without vegetation.  He was unable to be discharged to [facility] to complete ABX due to IVDU.  He was discharged on 03/08 without additional ABX. He then presented to the ED on 03/27 with CP, SOB.  Admitted to recent IV heroin relapse.  CTA on 03/26 without PE, but showed consolidation in the RLL, cavitation and trace right pneumothorax; Labs Flu, COVID testing negative.   Respiratory panel with enterovirus, rhinovirus.  QuantiFERON gold intermediate.  AFBs neg. S/p chest tube placement on 03/30 and tPA x 6.  CT surgery determined that no VATS was needed.  His chest tube was removed on 04/02 and he was weaned from 2 L nasal cannula to room air.  Pleural fluid cx with lactobacillus.  He completed Zosyn on 04/18 per ID recs.  Given suspected septic emboli -> lungs despite negative TTE, ID recommended IV vanc for 6 weeks (end date 05/10/2020) through his left PICC.  He was discharged to [facility] in [place] to complete course of IV antibiotics."        He has 1 more day of IV vanc to complete his recommended course per ID. He is currently denying pain. He was hypoglycemic on arrival and was given D50 and is now eating. Glucose has not " "been repeated, but nurse will do this now. Per chart notes he is intermittently confused. Per [facility] notes, "At one point he was noted to appear inebriated and a narcotic pill was found in his sock which was not prescribed to him. He denied ever having the pill. During this same time he did have a fall WO head trauma. He continued to complain of pelvic pain/LBP. CT A/P and LS ordered" which was completed today showing Age-indeterminate, possibly acute, mild superior endplate compression deformity of the L1 vertebral body, new in comparison to the prior study on 03/26/2020 and persistent cavitary lesion of the RLL that has decreased in size.  "

## 2020-05-09 NOTE — ED NOTES
Bed: [place] [address]  Expected date:   Expected time:   Means of arrival:   Comments:  MARY ANN WHITING

## 2020-05-09 NOTE — ASSESSMENT & PLAN NOTE
Consult Dr. Longoria - discussed with him  Appears to have equal strength bilat, but does not participate on exam

## 2020-05-09 NOTE — ED PROVIDER NOTES
Encounter Date: 2020       History     Chief Complaint   Patient presents with    Chest Pain     Patient here with reported left-sided chest pain radiating to his left shoulder acute onset this morning with no reported shortness of breath or nausea patient does have a history of IV drug abuse diabetes high blood pressure he is currently hospitalized at the step-down unit in the [place] receiving IV antibiotics treatment for an empyema he describes the pain as sharp and it is pain stabbing to the left upper chest he denies any history of blood clot the states that he has been in bed more and states he had a fall the other day striking his buttock causing increased pain to his sacral region states secondary to this fall he has been in bed even more        Review of patient's allergies indicates:  No Known Allergies  Past Medical History:   Diagnosis Date    Acute on chronic pancreatitis 4/3/2016    DKA (diabetic ketoacidoses) 2020    DM (diabetes mellitus), type 1     HCV (hepatitis C virus)     high VL     HTN (hypertension)     IVDU (intravenous drug user)     Heroin    Pancreatitis      Past Surgical History:   Procedure Laterality Date    CARDIAC SURGERY      stent placed. ([facility])    ESOPHAGOGASTRODUODENOSCOPY N/A 3/5/2020    Gastritis.  No H pylori.  Completed PPI for 1 month.  Procedure: EGD (ESOPHAGOGASTRODUODENOSCOPY);  Surgeon: Brinda Rene MD;  Location: [facility];  Service: Endoscopy;  Laterality: N/A;  W421 A; [id number]    SHOULDER SURGERY      right shoulder, spur removal.     Family History   Problem Relation Age of Onset    Asthma Mother      Social History     Tobacco Use    Smoking status: Former Smoker     Packs/day: 1.00     Years: 20.00     Pack years: 20.00     Types: Cigarettes     Start date: 4/3/1981     Last attempt to quit: 10/2019     Years since quittin.6    Smokeless tobacco: Never Used   Substance Use Topics    Alcohol use: No     Alcohol/week:  0.0 standard drinks    Drug use: Yes     Types: IV, Marijuana, Heroin     Comment: currently, used today, herion     Review of Systems   Constitutional: Negative.    HENT: Negative.    Eyes: Negative.    Respiratory: Positive for cough. Negative for shortness of breath.    Cardiovascular: Positive for chest pain. Negative for palpitations and leg swelling.   Gastrointestinal: Negative for abdominal pain, constipation, nausea and vomiting.   Endocrine: Negative.    Genitourinary: Negative.    Musculoskeletal: Negative.    Skin: Negative.    Allergic/Immunologic: Negative.    Neurological: Negative.    Hematological: Negative.    Psychiatric/Behavioral: Negative.        Physical Exam     Initial Vitals [05/09/20 1344]   BP Pulse Resp Temp SpO2   (!) 137/109 100 16 97.7 °F (36.5 °C) 95 %      MAP       --         Physical Exam    Constitutional: He appears well-developed and well-nourished. He does not appear ill.   HENT:   Head: Normocephalic and atraumatic.   Eyes: EOM are normal. Pupils are equal, round, and reactive to light.   Neck: Normal range of motion. Neck supple.   Cardiovascular: Normal rate, regular rhythm and normal pulses.   Pulmonary/Chest: Effort normal. No respiratory distress. He has rhonchi.   Abdominal: Soft. Bowel sounds are normal. There is no tenderness.   Musculoskeletal: Normal range of motion.        Right lower leg: Normal. He exhibits no edema.        Left lower leg: Normal. He exhibits no edema.   Neurological: He is alert and oriented to person, place, and time.   Skin: Skin is warm and dry. Capillary refill takes less than 2 seconds. No erythema.   Psychiatric: He has a normal mood and affect. His behavior is normal.         ED Course   Procedures  Labs Reviewed   CBC W/ AUTO DIFFERENTIAL   COMPREHENSIVE METABOLIC PANEL   TROPONIN I   B-TYPE NATRIURETIC PEPTIDE   PROTIME-INR   MAGNESIUM   URINALYSIS, REFLEX TO URINE CULTURE   DRUG SCREEN PANEL, URINE EMERGENCY        ECG Results           EKG 12-lead (In process)  Result time 05/09/20 13:47:01    In process by Interface, Lab In [facility] (05/09/20 13:47:01)                 Narrative:    Test Reason : R06.02,    Vent. Rate : 101 BPM     Atrial Rate : 101 BPM     P-R Int : 126 ms          QRS Dur : 084 ms      QT Int : 352 ms       P-R-T Axes : 060 037 050 degrees     QTc Int : 456 ms    Sinus tachycardia  Otherwise normal ECG  When compared with ECG of 01-APR-2020 23:50,  No significant change was found    Referred By:             Confirmed By:                             Imaging Results          X-Ray Chest AP Portable (Final result)  Result time 05/09/20 14:01:48    Final result by Doroteo Naranjo Jr., MD (05/09/20 14:01:48)                 Narrative:    Examination: AP portable chest.    CLINICAL HISTORY: Congestive heart failure.    COMPARISON: None.    FINDINGS: There are several telemetry leads identified in the  field-of-view. The heart is at the upper edge of normal magnified by  the diminished inspiratory effort. There are linear bands of increased  density perpendicular to long axis of the chest most profound in the  patient's left lung base extending towards the pleural margin  secondary to pulmonary scar formation. No evidence of consolidative  changes, pulmonic infiltrate, or significant pleural effusion. The  upper mediastinum is not significantly widened. Tracheal lumen is  without evidence of shift. Eventration of the right hemidiaphragm is  noted.    IMPRESSION: Linear bands of pulmonary scar formation in the basilar  segments bilaterally. No evidence of active process is demonstrated.    Electronically Signed by Doroteo ESPARZA on 5/9/2020 2:07 PM                               Medical Decision Making:   ED Management:  Patient's CTA shows persistent empyema no evidence of pulmonary embolism I have discussed case with hospitalist to evaluate patient for admission              [unfilled]                   Clinical Impression:        ICD-10-CM ICD-9-CM   1. Chest pain, unspecified type R07.9 786.50                                Ronny Oglesby MD  05/09/20 1517

## 2020-05-09 NOTE — H&P
"[facility]  History & Physical    DOS: 05/09/2020  3:55 PM      Patient Name: Cali Mabry  MRN: [id number]  Admission Date: 5/9/2020  Attending Physician: Dr. Phillip  Primary Care Provider: Cullen Zimmer MD         Patient information was obtained from patient and ER records.     Subjective:     Principal Problem:Chest pain    Chief Complaint:   Chief Complaint   Patient presents with    Chest Pain        HPI: Mr. Mabry presents today with complaints of chest pain. He is yelling at me over and over, "I'm FINE" and asked if he can go home because he "lives on the [facility]". Per ER MD he was complaining of sharp left chest pain radiating to the shoulder. He was not able or refused to answer my orientation questions. History is taken from notes at [facility]. He was "admitted on 02/22 and treated for DKA d/t noncompliance with insulin.  Infectious workup revealed RLL pneumonia.  Initial blood culture on 02/22 with MRSA in 1 set, likely from IVDU.  He had persistent fever and leukocytosis during his admission.  Subsequent blood cultures no growth, final.  Respiratory cultures with MRSA, E coli, Klebsiella.  PICC line was removed and tip Cx neg.  TTE without vegetation.  He was unable to be discharged to [facility] to complete ABX due to IVDU.  He was discharged on 03/08 without additional ABX. He then presented to the ED on 03/27 with CP, SOB.  Admitted to recent IV heroin relapse.  CTA on 03/26 without PE, but showed consolidation in the RLL, cavitation and trace right pneumothorax; Labs Flu, COVID testing negative.   Respiratory panel with enterovirus, rhinovirus.  QuantiFERON gold intermediate.  AFBs neg. S/p chest tube placement on 03/30 and tPA x 6.  CT surgery determined that no VATS was needed.  His chest tube was removed on 04/02 and he was weaned from 2 L nasal cannula to room air.  Pleural fluid cx with lactobacillus.  He completed Zosyn on " "04/18 per ID recs.  Given suspected septic emboli -> lungs despite negative TTE, ID recommended IV vanc for 6 weeks (end date 05/10/2020) through his left PICC.  He was discharged to [facility] in [place] to complete course of IV antibiotics."        He has 1 more day of IV vanc to complete his recommended course per ID. He is currently denying pain. He was hypoglycemic on arrival and was given D50 and is now eating. Glucose has not been repeated, but nurse will do this now. Per chart notes he is intermittently confused. Per LTAC notes, "At one point he was noted to appear inebriated and a narcotic pill was found in his sock which was not prescribed to him. He denied ever having the pill. During this same time he did have a fall WO head trauma. He continued to complain of pelvic pain/LBP. CT A/P and LS ordered" which was completed today showing Age-indeterminate, possibly acute, mild superior endplate compression deformity of the L1 vertebral body, new in comparison to the prior study on 03/26/2020 and persistent cavitary lesion of the RLL that has decreased in size.    Past Medical History:   Diagnosis Date    Acute on chronic pancreatitis 4/3/2016    DKA (diabetic ketoacidoses) 02/2020    DM (diabetes mellitus), type 1     HCV (hepatitis C virus)     high VL     HTN (hypertension)     IVDU (intravenous drug user)     Heroin    Pancreatitis        Past Surgical History:   Procedure Laterality Date    CARDIAC SURGERY  1999    stent placed. ([facility])    ESOPHAGOGASTRODUODENOSCOPY N/A 3/5/2020    Gastritis.  No H pylori.  Completed PPI for 1 month.  Procedure: EGD (ESOPHAGOGASTRODUODENOSCOPY);  Surgeon: Brinda Rene MD;  Location: [facility];  Service: Endoscopy;  Laterality: N/A;  [id number]; [phone]    SHOULDER SURGERY  2013    right shoulder, spur removal.       Review of patient's allergies indicates:  No Known Allergies    Current Facility-Administered Medications on File " Prior to Encounter   Medication    [MAR Hold - Suspended Admission] acetaminophen tablet 650 mg    [MAR Hold - Suspended Admission] albuterol-ipratropium 2.5 mg-0.5 mg/3 mL nebulizer solution 3 mL    [MAR Hold - Suspended Admission] ALPRAZolam tablet 0.5 mg    [MAR Hold - Suspended Admission] atorvastatin tablet 20 mg    [MAR Hold - Suspended Admission] B complex-vitamin C-folic acid 0.8 mg Tab 0.8 mg    [MAR Hold - Suspended Admission] baclofen tablet 10 mg    [MAR Hold - Suspended Admission] bisacodyL suppository 10 mg    [MAR Hold - Suspended Admission] calcium carbonate 200 mg calcium (500 mg) chewable tablet 500 mg    [MAR Hold - Suspended Admission] dextrose 10 % infusion    [MAR Hold - Suspended Admission] dextrose 10 % infusion    [MAR Hold - Suspended Admission] dextrose 50% injection 12.5 g    [MAR Hold - Suspended Admission] dextrose 50% injection 12.5 g    [MAR Hold - Suspended Admission] dextrose 50% injection 25 g    [MAR Hold - Suspended Admission] dextrose 50% injection 25 g    [MAR Hold - Suspended Admission] diphenhydrAMINE capsule 25 mg    [MAR Hold - Suspended Admission] divalproex capsule 125 mg    [MAR Hold - Suspended Admission] DULoxetine DR capsule 60 mg    [MAR Hold - Suspended Admission] gabapentin capsule 300 mg    [MAR Hold - Suspended Admission] glucagon (human recombinant) injection 1 mg    [MAR Hold - Suspended Admission] glucose chewable tablet 16 g    [MAR Hold - Suspended Admission] glucose chewable tablet 24 g    [MAR Hold - Suspended Admission] haloperidol lactate injection 1 mg    [MAR Hold - Suspended Admission] hydroxyzine HCL tablet 25 mg    [MAR Hold - Suspended Admission] insulin aspart U-100 pen 1-10 Units    [MAR Hold - Suspended Admission] insulin aspart U-100 pen 10 Units    [MAR Hold - Suspended Admission] insulin detemir U-100 pen 25 Units    [MAR Hold - Suspended Admission] L.acidophil,parac-S.therm-Bif. (Risaquad) Cap 1 capsule    [MAR  Hold - Suspended Admission] melatonin tablet 6 mg    [MAR Hold - Suspended Admission] mineral oil enema 1 enema    [MAR Hold - Suspended Admission] morphine injection 1 mg    [MAR Hold - Suspended Admission] multivitamin tablet    [MAR Hold - Suspended Admission] ondansetron injection 4 mg    [MAR Hold - Suspended Admission] oxyCODONE immediate release tablet Tab 10 mg    [MAR Hold - Suspended Admission] polyethylene glycol packet 17 g    [MAR Hold - Suspended Admission] prochlorperazine injection Soln 5 mg    [MAR Hold - Suspended Admission] QUEtiapine tablet 50 mg    [COMPLETED] sodium chloride 0.9% bolus 500 mL    [MAR Hold - Suspended Admission] sodium chloride 0.9% flush 10 mL    [MAR Hold - Suspended Admission] sodium chloride 0.9% flush 10 mL    [MAR Hold - Suspended Admission] trazodone split tablet 25 mg    [MAR Hold - Suspended Admission] vancomycin - pharmacy to dose    [MAR Hold - Suspended Admission] vancomycin 1.5 g in  mL IVPB (ready to mix)     Current Outpatient Medications on File Prior to Encounter   Medication Sig    acetaminophen (TYLENOL) 650 MG TbSR Take 650 mg by mouth every 8 (eight) hours as needed.    albuterol-ipratropium (DUO-NEB) 2.5 mg-0.5 mg/3 mL nebulizer solution Take 3 mLs by nebulization every 4 (four) hours as needed for Wheezing. Rescue    ALPRAZolam (XANAX) 0.5 MG tablet Take 0.5 mg by mouth 2 (two) times daily as needed for Anxiety.    ASCORBIC ACID, VITAMIN C, ORAL Take 1 tablet by mouth once daily.    atorvastatin (LIPITOR) 20 MG tablet Take 20 mg by mouth once daily.    b complex vitamins tablet Take 1 tablet by mouth once daily.    baclofen (LIORESAL) 10 MG tablet Take 10 mg by mouth 3 (three) times daily.    divalproex (DEPAKOTE) 125 MG EC tablet Take 125 mg by mouth every 8 (eight) hours.    DULoxetine (CYMBALTA) 60 MG capsule Take 1 capsule (60 mg total) by mouth once daily.    folic acid (FOLVITE) 800 MCG Tab Take 800 mcg by mouth once  daily.    gabapentin (NEURONTIN) 300 MG capsule Take 1 capsule (300 mg total) by mouth 3 (three) times daily.    hydroxyzine HCL (ATARAX) 25 MG tablet Take 1 tablet (25 mg total) by mouth 3 (three) times daily as needed for Anxiety.    insulin aspart U-100 (NOVOLOG) 100 unit/mL injection Inject 1-10 Units into the skin 3 (three) times daily with meals.    insulin detemir U-100 (LEVEMIR) 100 unit/mL injection Inject 25 Units into the skin 2 (two) times a day.    L. acidophilus/Strept/La p-whit[place] (RISAQUAD ORAL) Take 1 capsule by mouth once daily.    melatonin (MELATIN) 3 mg tablet Take 2 tablets (6 mg total) by mouth nightly as needed for Insomnia.    multivitamin (THERAGRAN) per tablet Take 1 tablet by mouth once daily.    QUEtiapine (SEROQUEL) 50 MG tablet Take 50 mg by mouth every evening.    traZODone (DESYREL) 50 MG tablet Take 25 mg by mouth every evening.    bisacodyL (DULCOLAX) 10 mg Supp Place 10 mg rectally daily as needed.    blood sugar diagnostic Strp Use To check blood glucose three times daily,    blood-glucose meter Misc To check Blood glucose three times daily,    calcium carbonate (TUMS) 200 mg calcium (500 mg) chewable tablet Take 1 tablet (500 mg total) by mouth daily as needed for Heartburn.    diphenhydrAMINE (BENADRYL) 25 mg capsule Take 25 mg by mouth every 6 (six) hours as needed for Itching.    docusate sodium (COLACE) 100 MG capsule Take 1 capsule (100 mg total) by mouth 2 (two) times daily as needed for Constipation.    folic acid-vit B6-vit B12 2.5-25-2 mg (FOLBIC OR EQUIV) 2.5-25-2 mg Tab Take 1 tablet by mouth once daily.    glucagon, human recombinant, (GLUCAGEN HYPOKIT) 1 mg SolR Inject 1 mg into the muscle as needed.    glucose 4 GM chewable tablet Take 16 g by mouth as needed for Low blood sugar.    HALOPERIDOL LACTATE INJ Inject 1 mg as directed every 4 (four) hours as needed.    insulin lispro 100 unit/mL injection Inject 10 Units into the skin 3 (three) times  daily before meals.    lancets Misc To check BG 3 times daily, to use with insurance preferred meter    LANTUS U-100 INSULIN 100 unit/mL injection Inject 22 Units into the skin 2 (two) times daily.    lidocaine (LIDODERM) 5 % Place 1 patch onto the skin once daily. Remove & Discard patch within 12 hours or as directed by MD.  Apply to shoulder for pain    lidocaine (LIDODERM) 5 % Place 1 patch onto the skin daily as needed. Remove & Discard patch within 12 hours or as directed by MD.  Apply to back for pain    mineral oil (FLEET OIL RETENTION) enema Place 1 enema rectally daily as needed for Constipation.    morphine sulfate (MORPHINE INJ) Inject 1 mg as directed every 8 (eight) hours as needed.    ondansetron (ZOFRAN) 2 mg/mL injection Inject 4 mg into the vein every 6 (six) hours as needed.    ondansetron (ZOFRAN-ODT) 8 MG TbDL Take 1 tablet (8 mg total) by mouth every 8 (eight) hours as needed.    oxyCODONE (ROXICODONE) 10 mg Tab immediate release tablet Take 10 mg by mouth every 6 (six) hours as needed for Pain.    oxyCODONE-acetaminophen (PERCOCET) 5-325 mg per tablet Take 1 tablet by mouth every 4 (four) hours as needed for Pain.    polyethylene glycol (GLYCOLAX) 17 gram PwPk Take 17 g by mouth daily as needed (Constipation). (Patient taking differently: Take 17 g by mouth 3 (three) times daily as needed (Constipation). )    vancomycin HCl in 5 % dextrose (VANCOMYCIN IN DEXTROSE 5 %) 2 gram/500 mL Soln Inject 500 mLs (2,000 mg total) into the vein once daily. Vanc 2000 mg Q 24 at 2000. Vanc trough needs to be drawn on  at 1930, 30 min before the 4th dose. End on 5/10. for 18 days     Family History     Problem Relation (Age of Onset)    Asthma Mother        Tobacco Use    Smoking status: Former Smoker     Packs/day: 1.00     Years: 20.00     Pack years: 20.00     Types: Cigarettes     Start date: 4/3/1981     Last attempt to quit: 10/2019     Years since quittin.6    Smokeless tobacco:  "Never Used   Substance and Sexual Activity    Alcohol use: No     Alcohol/week: 0.0 standard drinks    Drug use: Yes     Types: IV, Marijuana, Heroin     Comment: currently, used today, herion    Sexual activity: Yes     Partners: Female     Review of Systems   Unable to perform ROS: Other    Pt is refusing to answer my questions, instead repeating "Im FINE" over and over when asked ROS questions.  Objective:     Vital Signs (Most Recent):  Temp: 97.7 °F (36.5 °C) (05/09/20 1344)  Pulse: (!) 115 (05/09/20 1600)  Resp: (!) 21 (05/09/20 1600)  BP: (!) 164/72 (05/09/20 1600)  SpO2: (!) 94 % (05/09/20 1600) Vital Signs (24h Range):  Temp:  [96.6 °F (35.9 °C)-97.7 °F (36.5 °C)] 97.7 °F (36.5 °C)  Pulse:  [] 115  Resp:  [15-29] 21  SpO2:  [94 %-99 %] 94 %  BP: (107-164)/() 164/72     Weight: 59 kg (130 lb)  Body mass index is 19.77 kg/m².    Physical Exam   Constitutional: He appears well-developed.   thin   HENT:   Head: Normocephalic and atraumatic.   Eyes: Pupils are equal, round, and reactive to light. Conjunctivae and EOM are normal.   Neck: Normal range of motion. Neck supple.   Cardiovascular: Regular rhythm, normal heart sounds and intact distal pulses.   Tachycardia   Pulmonary/Chest: Effort normal and breath sounds normal.   Abdominal: Soft. Bowel sounds are normal.   Musculoskeletal: Normal range of motion. He exhibits edema.   bilat LE edema 1+   Neurological: He is alert.   Will not answer orientation questions   Skin: Skin is warm and dry. Capillary refill takes less than 2 seconds.   Psychiatric:   Yelling and cursing at me refusing to answer questions   Nursing note and vitals reviewed.        CRANIAL NERVES     CN III, IV, VI   Pupils are equal, round, and reactive to light.  Extraocular motions are normal.        Significant Labs:   CBC:   Recent Labs   Lab 05/08/20  0934 05/09/20  1404   WBC 6.93 5.97   HGB 11.1* 9.2*   HCT 38.6* 30.7*    250     CMP:   Recent Labs   Lab " 05/08/20  0934 05/09/20  1404    136   K 4.3 4.0    101   CO2 26 27    126*   BUN 29* 32*   CREATININE 1.7* 1.2   CALCIUM 9.9 8.7   PROT 9.1* 8.1   ALBUMIN 3.5 3.5   BILITOT 0.4 0.8   ALKPHOS 140* 107   AST 42* 30   * 102*   ANIONGAP 14 8   EGFRNONAA 45* >60.0     Troponin:   Recent Labs   Lab 05/09/20  1404   TROPONINI <0.030       Significant Imaging: EKG: I have reviewed all pertinent results/findings within the past 24 hours and my personal findings are: sinus tachycardia no acute ischemic change    Assessment/Plan:     * Chest pain  Admit to cardiology B   Trend troponins - first negative   ASA   Currently denying pain   Echo on 3/29 - see study         Hypoglycemia  Improved, monitor with accuchecks         Closed compression fracture of body of L1 vertebra  Consult Dr. Longoria - discussed with him  Appears to have equal strength bilat, but does not participate on exam       Bacterial pneumonia  Has one more day of vanc (5/10/20) - I have ordered  Appears improved on CT         Type 1 diabetes mellitus with hyperglycemia  Continue levemir   accuchecks achs with low dose ISS         VTE Risk Mitigation (From admission, onward)         Ordered     Place sequential compression device  Until discontinued      05/09/20 1557     IP VTE LOW RISK PATIENT  Once      05/09/20 1557                   Theresa Oscar NP  Department of Hospital Medicine   Formerly [facility]

## 2020-05-10 LAB
ANION GAP SERPL CALC-SCNC: 7 MMOL/L (ref 8–16)
BASOPHILS # BLD AUTO: 0.01 K/UL (ref 0–0.2)
BASOPHILS NFR BLD: 0.2 % (ref 0–1.9)
BUN SERPL-MCNC: 27 MG/DL (ref 6–20)
CALCIUM SERPL-MCNC: 8.7 MG/DL (ref 8.7–10.5)
CHLORIDE SERPL-SCNC: 102 MMOL/L (ref 95–110)
CO2 SERPL-SCNC: 26 MMOL/L (ref 23–29)
CREAT SERPL-MCNC: 1 MG/DL (ref 0.5–1.4)
DIFFERENTIAL METHOD: ABNORMAL
EOSINOPHIL # BLD AUTO: 0 K/UL (ref 0–0.5)
EOSINOPHIL NFR BLD: 0.8 % (ref 0–8)
ERYTHROCYTE [DISTWIDTH] IN BLOOD BY AUTOMATED COUNT: 17.4 % (ref 11.5–14.5)
EST. GFR  (AFRICAN AMERICAN): >60 ML/MIN/1.73 M^2
EST. GFR  (NON AFRICAN AMERICAN): >60 ML/MIN/1.73 M^2
GLUCOSE SERPL-MCNC: 199 MG/DL (ref 70–110)
GLUCOSE SERPL-MCNC: 389 MG/DL (ref 70–110)
HCT VFR BLD AUTO: 29.8 % (ref 40–54)
HGB BLD-MCNC: 9.1 G/DL (ref 14–18)
IMM GRANULOCYTES # BLD AUTO: 0.01 K/UL (ref 0–0.04)
IMM GRANULOCYTES NFR BLD AUTO: 0.2 % (ref 0–0.5)
LYMPHOCYTES # BLD AUTO: 1.6 K/UL (ref 1–4.8)
LYMPHOCYTES NFR BLD: 33.4 % (ref 18–48)
MAGNESIUM SERPL-MCNC: 2.3 MG/DL (ref 1.6–2.6)
MCH RBC QN AUTO: 26.8 PG (ref 27–31)
MCHC RBC AUTO-ENTMCNC: 30.5 G/DL (ref 32–36)
MCV RBC AUTO: 88 FL (ref 82–98)
MONOCYTES # BLD AUTO: 0.7 K/UL (ref 0.3–1)
MONOCYTES NFR BLD: 13.6 % (ref 4–15)
NEUTROPHILS # BLD AUTO: 2.5 K/UL (ref 1.8–7.7)
NEUTROPHILS NFR BLD: 51.8 % (ref 38–73)
NRBC BLD-RTO: 0 /100 WBC
PLATELET # BLD AUTO: 221 K/UL (ref 150–350)
PMV BLD AUTO: 10.3 FL (ref 9.2–12.9)
POTASSIUM SERPL-SCNC: 4.3 MMOL/L (ref 3.5–5.1)
RBC # BLD AUTO: 3.39 M/UL (ref 4.6–6.2)
SODIUM SERPL-SCNC: 135 MMOL/L (ref 136–145)
WBC # BLD AUTO: 4.85 K/UL (ref 3.9–12.7)

## 2020-05-10 PROCEDURE — 99900035 HC TECH TIME PER 15 MIN (STAT)

## 2020-05-10 PROCEDURE — G0378 HOSPITAL OBSERVATION PER HR: HCPCS

## 2020-05-10 PROCEDURE — 25000003 PHARM REV CODE 250: Performed by: INTERNAL MEDICINE

## 2020-05-10 PROCEDURE — 96372 THER/PROPH/DIAG INJ SC/IM: CPT | Mod: 59

## 2020-05-10 PROCEDURE — 85025 COMPLETE CBC W/AUTO DIFF WBC: CPT

## 2020-05-10 PROCEDURE — 25000003 PHARM REV CODE 250: Performed by: NURSE PRACTITIONER

## 2020-05-10 PROCEDURE — 80048 BASIC METABOLIC PNL TOTAL CA: CPT

## 2020-05-10 PROCEDURE — 99203 PR OFFICE/OUTPT VISIT, NEW, LEVL III, 30-44 MIN: ICD-10-PCS | Mod: ,,, | Performed by: NEUROLOGICAL SURGERY

## 2020-05-10 PROCEDURE — 63600175 PHARM REV CODE 636 W HCPCS: Performed by: NURSE PRACTITIONER

## 2020-05-10 PROCEDURE — 99203 OFFICE O/P NEW LOW 30 MIN: CPT | Mod: ,,, | Performed by: NEUROLOGICAL SURGERY

## 2020-05-10 PROCEDURE — 63600175 PHARM REV CODE 636 W HCPCS: Mod: GZ | Performed by: INTERNAL MEDICINE

## 2020-05-10 PROCEDURE — 83735 ASSAY OF MAGNESIUM: CPT

## 2020-05-10 PROCEDURE — 36415 COLL VENOUS BLD VENIPUNCTURE: CPT

## 2020-05-10 PROCEDURE — 94761 N-INVAS EAR/PLS OXIMETRY MLT: CPT

## 2020-05-10 RX ORDER — GABAPENTIN 300 MG/1
600 CAPSULE ORAL 3 TIMES DAILY
Status: DISCONTINUED | OUTPATIENT
Start: 2020-05-10 | End: 2020-05-11 | Stop reason: HOSPADM

## 2020-05-10 RX ADMIN — OXYCODONE HYDROCHLORIDE 10 MG: 5 TABLET ORAL at 01:05

## 2020-05-10 RX ADMIN — GABAPENTIN 600 MG: 300 CAPSULE ORAL at 04:05

## 2020-05-10 RX ADMIN — HUMAN INSULIN 9 UNITS: 100 INJECTION, SOLUTION SUBCUTANEOUS at 04:05

## 2020-05-10 RX ADMIN — OXYCODONE HYDROCHLORIDE 10 MG: 5 TABLET ORAL at 08:05

## 2020-05-10 RX ADMIN — INSULIN ASPART 5 UNITS: 100 INJECTION, SOLUTION INTRAVENOUS; SUBCUTANEOUS at 11:05

## 2020-05-10 RX ADMIN — OXYCODONE HYDROCHLORIDE 10 MG: 5 TABLET ORAL at 05:05

## 2020-05-10 RX ADMIN — DIVALPROEX SODIUM 125 MG: 125 CAPSULE, COATED PELLETS ORAL at 01:05

## 2020-05-10 RX ADMIN — ATORVASTATIN CALCIUM 20 MG: 20 TABLET, FILM COATED ORAL at 08:05

## 2020-05-10 RX ADMIN — QUETIAPINE FUMARATE 50 MG: 25 TABLET, FILM COATED ORAL at 08:05

## 2020-05-10 RX ADMIN — TRAZODONE HYDROCHLORIDE 25 MG: 50 TABLET ORAL at 08:05

## 2020-05-10 RX ADMIN — SENNOSIDES AND DOCUSATE SODIUM 1 TABLET: 8.6; 5 TABLET ORAL at 08:05

## 2020-05-10 RX ADMIN — BACLOFEN 10 MG: 10 TABLET ORAL at 08:05

## 2020-05-10 RX ADMIN — OXYCODONE HYDROCHLORIDE AND ACETAMINOPHEN 500 MG: 500 TABLET ORAL at 10:05

## 2020-05-10 RX ADMIN — DIVALPROEX SODIUM 125 MG: 125 CAPSULE, COATED PELLETS ORAL at 10:05

## 2020-05-10 RX ADMIN — GABAPENTIN 600 MG: 300 CAPSULE ORAL at 08:05

## 2020-05-10 RX ADMIN — DIVALPROEX SODIUM 125 MG: 125 CAPSULE, COATED PELLETS ORAL at 05:05

## 2020-05-10 RX ADMIN — HUMAN INSULIN 3 UNITS: 100 INJECTION, SOLUTION SUBCUTANEOUS at 08:05

## 2020-05-10 RX ADMIN — ACETAMINOPHEN 650 MG: 325 TABLET ORAL at 10:05

## 2020-05-10 RX ADMIN — INSULIN DETEMIR 25 UNITS: 100 INJECTION, SOLUTION SUBCUTANEOUS at 11:05

## 2020-05-10 RX ADMIN — SENNOSIDES AND DOCUSATE SODIUM 1 TABLET: 8.6; 5 TABLET ORAL at 10:05

## 2020-05-10 RX ADMIN — FOLIC ACID 1000 MCG: 1 TABLET ORAL at 10:05

## 2020-05-10 RX ADMIN — INSULIN DETEMIR 25 UNITS: 100 INJECTION, SOLUTION SUBCUTANEOUS at 08:05

## 2020-05-10 RX ADMIN — BACLOFEN 10 MG: 10 TABLET ORAL at 10:05

## 2020-05-10 RX ADMIN — ALPRAZOLAM 0.5 MG: 0.5 TABLET ORAL at 01:05

## 2020-05-10 RX ADMIN — ASPIRIN 325 MG: 325 TABLET, COATED ORAL at 10:05

## 2020-05-10 RX ADMIN — DULOXETINE 60 MG: 30 CAPSULE, DELAYED RELEASE ORAL at 10:05

## 2020-05-10 RX ADMIN — GABAPENTIN 300 MG: 300 CAPSULE ORAL at 10:05

## 2020-05-10 RX ADMIN — BACLOFEN 10 MG: 10 TABLET ORAL at 04:05

## 2020-05-10 NOTE — PROGRESS NOTES
VANCOMYCIN PHARMACOKINETIC NOTE:  Vancomycin Day # last day of 6wk therapy from LTAC    Objective:    54 y.o., male, Actual Body Weight = 68.4 kg (150 lb 12.7 oz)  Diagnosis/Indication for Vancomycin:  Lower Respiratory Tract Infection   Desired Vancomycin Peak:  30-35 mcg/ml; Desired Trough: 10-15 mcg/ml    Diagnosis/Indication for Vancomycin:  Endocarditis         Receiving other antibiotics:    Antibiotics (From admission, onward)      Start     Stop Route Frequency Ordered    05/09/20 2100  vancomycin (VANCOCIN) 1,500 mg in dextrose 5 % 500 mL IVPB      -- IV Once 05/09/20 2000 05/09/20 1738  vancomycin - pharmacy to dose  (vancomycin IVPB)      -- IV pharmacy to manage frequency 05/09/20 1640             The patient has the following labs:     5/9/2020 Estimated Creatinine Clearance: 68.9 mL/min (based on SCr of 1.2 mg/dL). Lab Results   Component Value Date    BUN 32 (H) 05/09/2020       Lab Results   Component Value Date    WBC 5.97 05/09/2020          Random vancomycin:  17.7  on 5.9    Microbiology Results (last 7 days)       ** No results found for the last 168 hours. **             Assessment:  Vancomycin trough is within goal range.      Plan:  Patient to have one more dose of vancomycin 1500 mg to complete 6 wk treatment from LTAC as per Theresa Oscar NP    Pharmacy will continue to manage vancomycin therapy and adjust regimen as necessary.    Thank you for allowing us to participate in this patient's care.     Zakiya Franco 5/9/2020 8:03 PM  Department of Pharmacy  Ext 1700

## 2020-05-10 NOTE — CONSULTS
Inpatient consult to Neurosurgery  Consult performed by: Kaleb Longoria MD  Consult ordered by: Theresa Oscar NP         NEUROSURGICAL INPATIENT CONSULTATION NOTE    DATE OF SERVICE:  05/10/2020    ATTENDING PHYSICIAN:  Kaleb Longoria MD      REASON FOR CONSULT:  L1 fracture    SUBJECTIVE:    HISTORY OF PRESENT ILLNESS:  This is a very pleasant 54 y.o. male who has been admitted yesterday for chest pain.  He was at the [facility], recovering from DKA, uncontrolled diabetes on insulin, known peripheral polyneuropathy.  Patient is a poor historian but he reports that he fell from his bed.  He has difficulty walking because of severe polyneuropathy in his legs.  Since the fall he reports increase hand and bilateral feet pain.  He also reports low back pain the lumbosacral area.  No new onset of weakness, numbness or sphincter dysfunction.                PAST MEDICAL HISTORY:  Active Ambulatory Problems     Diagnosis Date Noted    Intractable abdominal pain 04/03/2016    Type 1 diabetes mellitus with hyperglycemia 04/03/2016    Essential hypertension 04/03/2016    Anemia of chronic disease 04/03/2016    Hyperlipidemia 11/23/2017    Intravenous drug abuse 11/23/2017    Chronic hepatitis C 11/23/2017    Chronic relapsing pancreatitis 11/23/2017    Tobacco abuse 11/23/2017    Cocaine abuse 08/05/2018    Marijuana abuse 08/05/2018    Opioid abuse 08/05/2018    Elevated troponin 03/15/2019    Pulmonary nodule 04/13/2019    Bacterial pneumonia 02/29/2020    Septic embolism 03/27/2020    Parapneumonic effusion 03/30/2020    Pain of left lower extremity 04/18/2020    Other emphysema 04/20/2020    Transaminitis 04/20/2020    YONAS (generalized anxiety disorder) 04/20/2020    Mild episode of recurrent major depressive disorder 04/21/2020    Insomnia 04/21/2020    Empyema lung 04/22/2020    Polysubstance abuse 04/23/2020    Moderate protein malnutrition 04/23/2020    Cavitary pneumonia 04/24/2020     Hyperglycemia 04/29/2020    Agitation 04/30/2020     Resolved Ambulatory Problems     Diagnosis Date Noted    Acute on chronic pancreatitis 04/03/2016    Pancreatitis, chronic 04/03/2016    Diabetic ketoacidosis 07/22/2016    Septic shock 11/23/2017    Anemia of chronic disease 11/23/2017    SIRS (systemic inflammatory response syndrome) 11/24/2017    Fever, unknown origin 08/04/2018    Acute renal failure 03/12/2019    Hyperkalemia 03/12/2019    Hypothermia 03/12/2019    IV drug user 03/12/2019    Acidosis 04/13/2019    Hyperkalemia 04/13/2019    Hyponatremia 04/13/2019    DKA (diabetic ketoacidoses) 04/13/2019    Acute renal failure 04/13/2019    Diabetic ketoacidosis with coma associated with type 1 diabetes mellitus 04/13/2019    Diabetic ketoacidosis without coma associated with diabetes mellitus due to underlying condition 02/22/2020    Pneumonia due to infectious organism 02/22/2020    RAMYA (acute kidney injury) 02/22/2020    MRSA bacteremia 02/23/2020    Sepsis 03/01/2020    Dysphagia 03/03/2020    Somnolence 03/03/2020    Encephalopathy, toxic 03/03/2020    Acute gastritis without hemorrhage 03/06/2020    Chest pain 03/28/2020    Iron deficiency anemia 04/04/2020    Reactive thrombocytosis 04/04/2020    Weight loss, unintentional 04/17/2020    Acute hypoxemic respiratory failure 04/19/2020     Past Medical History:   Diagnosis Date    DM (diabetes mellitus), type 1     HCV (hepatitis C virus)     HTN (hypertension)     IVDU (intravenous drug user)     Pancreatitis        PAST SURGICAL HISTORY:  Past Surgical History:   Procedure Laterality Date    CARDIAC SURGERY  1999    stent placed. ([facility])    ESOPHAGOGASTRODUODENOSCOPY N/A 3/5/2020    Gastritis.  No H pylori.  Completed PPI for 1 month.  Procedure: EGD (ESOPHAGOGASTRODUODENOSCOPY);  Surgeon: Brinda Rene MD;  Location: [facility];  Service: Endoscopy;  Laterality: N/A;  W421 A; [phone]    SHOULDER  SURGERY  2013    right shoulder, spur removal.       SOCIAL HISTORY:   Social History     Socioeconomic History    Marital status: Single     Spouse name: Not on file    Number of children: Not on file    Years of education: Not on file    Highest education level: Not on file   Occupational History    Not on file   Social Needs    Financial resource strain: Not on file    Food insecurity:     Worry: Not on file     Inability: Not on file    Transportation needs:     Medical: Not on file     Non-medical: Not on file   Tobacco Use    Smoking status: Former Smoker     Packs/day: 1.00     Years: 20.00     Pack years: 20.00     Types: Cigarettes     Start date: 4/3/1981     Last attempt to quit: 10/2019     Years since quittin.6    Smokeless tobacco: Never Used   Substance and Sexual Activity    Alcohol use: No     Alcohol/week: 0.0 standard drinks    Drug use: Yes     Types: IV, Marijuana, Heroin     Comment: currently, used today, herion    Sexual activity: Yes     Partners: Female   Lifestyle    Physical activity:     Days per week: Not on file     Minutes per session: Not on file    Stress: Not on file   Relationships    Social connections:     Talks on phone: Not on file     Gets together: Not on file     Attends Mosque service: Not on file     Active member of club or organization: Not on file     Attends meetings of clubs or organizations: Not on file     Relationship status: Not on file   Other Topics Concern    Not on file   Social History Narrative    Not on file       FAMILY HISTORY:  Family History   Problem Relation Age of Onset    Asthma Mother        CURRENTS MEDICATIONS:    Current Facility-Administered Medications on File Prior to Encounter   Medication Dose Route Frequency Provider Last Rate Last Dose    [MAR Hold - Suspended Admission] acetaminophen tablet 650 mg  650 mg Oral Q8H PRN Darren Zarate MD   650 mg at 20 1124    [MAR Hold - Suspended Admission]  albuterol-ipratropium 2.5 mg-0.5 mg/3 mL nebulizer solution 3 mL  3 mL Nebulization Q4H PRN Darren Zarate MD        [MAR Hold - Suspended Admission] ALPRAZolam tablet 0.5 mg  0.5 mg Oral BID PRN Darren Zarate MD   0.5 mg at 05/08/20 2029    [MAR Hold - Suspended Admission] atorvastatin tablet 20 mg  20 mg Oral Daily Darren Zarate MD   20 mg at 05/09/20 0823    [MAR Hold - Suspended Admission] B complex-vitamin C-folic acid 0.8 mg Tab 0.8 mg  1 tablet Oral Daily Darren Zarate MD   0.8 mg at 05/09/20 0823    [MAR Hold - Suspended Admission] baclofen tablet 10 mg  10 mg Oral TID Darren Zarate MD   10 mg at 05/09/20 0823    [MAR Hold - Suspended Admission] bisacodyL suppository 10 mg  10 mg Rectal Daily PRN Darren Zarate MD        [MAR Hold - Suspended Admission] calcium carbonate 200 mg calcium (500 mg) chewable tablet 500 mg  500 mg Oral Daily PRN Darren Zarate MD        [MAR Hold - Suspended Admission] dextrose 10 % infusion  1,000 mL Intravenous PRN Darren Zarate MD        [MAR Hold - Suspended Admission] dextrose 10 % infusion  1,000 mL Intravenous PRN Darren Zarate MD        [MAR Hold - Suspended Admission] dextrose 50% injection 12.5 g  12.5 g Intravenous PRN Darren Zarate MD   12.5 g at 05/02/20 0852    [MAR Hold - Suspended Admission] dextrose 50% injection 12.5 g  12.5 g Intravenous PRN Darren Zarate MD        [MAR Hold - Suspended Admission] dextrose 50% injection 25 g  25 g Intravenous PRN Darren Zarate MD        [MAR Hold - Suspended Admission] dextrose 50% injection 25 g  25 g Intravenous PRN Darren Zarate MD        [MAR Hold - Suspended Admission] diphenhydrAMINE capsule 25 mg  25 mg Oral Q6H PRN Darren Zarate MD        [MAR Hold - Suspended Admission] divalproex capsule 125 mg  125 mg Oral Q8H Darren Zarate MD   125 mg at 05/09/20 0603    [MAR Hold -  Suspended Admission] DULoxetine DR capsule 60 mg  60 mg Oral Daily Darren Zarate MD   60 mg at 05/09/20 0823    [MAR Hold - Suspended Admission] gabapentin capsule 300 mg  300 mg Oral TID Darren Zarate MD   300 mg at 05/09/20 0823    [MAR Hold - Suspended Admission] glucagon (human recombinant) injection 1 mg  1 mg Intramuscular PRN Darren Zarate MD        [MAR Hold - Suspended Admission] glucose chewable tablet 16 g  16 g Oral PRN Darren Zarate MD        [MAR Hold - Suspended Admission] glucose chewable tablet 24 g  24 g Oral PRN Darren Zarate MD        [MAR Hold - Suspended Admission] haloperidol lactate injection 1 mg  1 mg Intravenous Q4H PRN Darren Zarate MD   1 mg at 05/01/20 1349    [MAR Hold - Suspended Admission] hydroxyzine HCL tablet 25 mg  25 mg Oral TID PRN Darren Zarate MD   25 mg at 05/05/20 0832    [MAR Hold - Suspended Admission] L.acidophil,parac-S.therm-Bif. (Risaquad) Cap 1 capsule  1 capsule Oral Daily Darren Zarate MD   1 capsule at 05/09/20 0823    [MAR Hold - Suspended Admission] melatonin tablet 6 mg  6 mg Oral Nightly PRN Darren Zarate MD   6 mg at 05/08/20 2126    [MAR Hold - Suspended Admission] mineral oil enema 1 enema  1 enema Rectal Daily PRN Darren Zarate MD        [MAR Hold - Suspended Admission] morphine injection 1 mg  1 mg Intravenous Q8H PRN Darren Zarate MD   1 mg at 05/09/20 0600    [MAR Hold - Suspended Admission] multivitamin tablet  1 tablet Oral Daily Darren Zarate MD   1 tablet at 05/09/20 0823    [MAR Hold - Suspended Admission] ondansetron injection 4 mg  4 mg Intravenous Q6H PRN Darren Zarate MD        [MAR Hold - Suspended Admission] oxyCODONE immediate release tablet Tab 10 mg  10 mg Oral Q6H PRN Darren Zarate MD   10 mg at 05/09/20 0823    [MAR Hold - Suspended Admission] polyethylene glycol packet 17 g  17 g Oral TID PRN  Darren Zarate MD   17 g at 04/30/20 1707    [MAR Hold - Suspended Admission] prochlorperazine injection Soln 5 mg  5 mg Intravenous Q4H PRN Darren Zarate MD        [MAR Hold - Suspended Admission] QUEtiapine tablet 50 mg  50 mg Oral QHS Darren Zarate MD   50 mg at 05/08/20 2029    [MAR Hold - Suspended Admission] sodium chloride 0.9% flush 10 mL  10 mL Intravenous PRN Darren Zarate MD        [MAR Hold - Suspended Admission] sodium chloride 0.9% flush 10 mL  10 mL Intravenous PRN Darren Zarate MD        [MAR Hold - Suspended Admission] trazodone split tablet 25 mg  25 mg Oral QHS Darren Zarate MD   25 mg at 05/08/20 2029    [MAR Hold - Suspended Admission] vancomycin - pharmacy to dose   Intravenous pharmacy to manage frequency Darren Zarate MD        [MAR Hold - Suspended Admission] vancomycin 1.5 g in  mL IVPB (ready to mix)  1,500 mg Intravenous Q24H Darren Zarate .7 mL/hr at 05/08/20 1821 1,500 mg at 05/08/20 1821    [DISCONTINUED] insulin aspart U-100 pen 1-10 Units  1-10 Units Subcutaneous QID (AC + HS) PRN Darren Zarate MD   10 Units at 05/07/20 1121    [DISCONTINUED] insulin aspart U-100 pen 10 Units  10 Units Subcutaneous TIDWM Darren Zarate MD   10 Units at 05/09/20 1206    [DISCONTINUED] insulin detemir U-100 pen 25 Units  25 Units Subcutaneous BID Darren Zarate MD   25 Units at 05/09/20 0822     Current Outpatient Medications on File Prior to Encounter   Medication Sig Dispense Refill    acetaminophen (TYLENOL) 650 MG TbSR Take 650 mg by mouth every 8 (eight) hours as needed.      albuterol-ipratropium (DUO-NEB) 2.5 mg-0.5 mg/3 mL nebulizer solution Take 3 mLs by nebulization every 4 (four) hours as needed for Wheezing. Rescue      ALPRAZolam (XANAX) 0.5 MG tablet Take 0.5 mg by mouth 2 (two) times daily as needed for Anxiety.      ASCORBIC ACID, VITAMIN C, ORAL Take 1 tablet by  mouth once daily.      atorvastatin (LIPITOR) 20 MG tablet Take 20 mg by mouth once daily.      b complex vitamins tablet Take 1 tablet by mouth once daily.      baclofen (LIORESAL) 10 MG tablet Take 10 mg by mouth 3 (three) times daily.      divalproex (DEPAKOTE) 125 MG EC tablet Take 125 mg by mouth every 8 (eight) hours.      DULoxetine (CYMBALTA) 60 MG capsule Take 1 capsule (60 mg total) by mouth once daily.      folic acid (FOLVITE) 800 MCG Tab Take 800 mcg by mouth once daily.      gabapentin (NEURONTIN) 300 MG capsule Take 1 capsule (300 mg total) by mouth 3 (three) times daily.      hydroxyzine HCL (ATARAX) 25 MG tablet Take 1 tablet (25 mg total) by mouth 3 (three) times daily as needed for Anxiety.      insulin aspart U-100 (NOVOLOG) 100 unit/mL injection Inject 1-10 Units into the skin 3 (three) times daily with meals.      insulin detemir U-100 (LEVEMIR) 100 unit/mL injection Inject 25 Units into the skin 2 (two) times a day.      L. acidophilus/Strept/La p-whitney (RISAQUAD ORAL) Take 1 capsule by mouth once daily.      melatonin (MELATIN) 3 mg tablet Take 2 tablets (6 mg total) by mouth nightly as needed for Insomnia.      multivitamin (THERAGRAN) per tablet Take 1 tablet by mouth once daily.      QUEtiapine (SEROQUEL) 50 MG tablet Take 50 mg by mouth every evening.      traZODone (DESYREL) 50 MG tablet Take 25 mg by mouth every evening.      bisacodyL (DULCOLAX) 10 mg Supp Place 10 mg rectally daily as needed.      blood sugar diagnostic Strp Use To check blood glucose three times daily, 200 each 0    blood-glucose meter Misc To check Blood glucose three times daily, 1 each 0    calcium carbonate (TUMS) 200 mg calcium (500 mg) chewable tablet Take 1 tablet (500 mg total) by mouth daily as needed for Heartburn.      diphenhydrAMINE (BENADRYL) 25 mg capsule Take 25 mg by mouth every 6 (six) hours as needed for Itching.      docusate sodium (COLACE) 100 MG capsule Take 1 capsule (100 mg  total) by mouth 2 (two) times daily as needed for Constipation.      folic acid-vit B6-vit B12 2.5-25-2 mg (FOLBIC OR EQUIV) 2.5-25-2 mg Tab Take 1 tablet by mouth once daily.      glucagon, human recombinant, (GLUCAGEN HYPOKIT) 1 mg SolR Inject 1 mg into the muscle as needed.      glucose 4 GM chewable tablet Take 16 g by mouth as needed for Low blood sugar.      HALOPERIDOL LACTATE INJ Inject 1 mg as directed every 4 (four) hours as needed.      insulin lispro 100 unit/mL injection Inject 10 Units into the skin 3 (three) times daily before meals.      lancets Misc To check BG 3 times daily, to use with insurance preferred meter 200 each 0    LANTUS U-100 INSULIN 100 unit/mL injection Inject 22 Units into the skin 2 (two) times daily.      lidocaine (LIDODERM) 5 % Place 1 patch onto the skin once daily. Remove & Discard patch within 12 hours or as directed by MD.  Apply to shoulder for pain      lidocaine (LIDODERM) 5 % Place 1 patch onto the skin daily as needed. Remove & Discard patch within 12 hours or as directed by MD.  Apply to back for pain      mineral oil (FLEET OIL RETENTION) enema Place 1 enema rectally daily as needed for Constipation.      morphine sulfate (MORPHINE INJ) Inject 1 mg as directed every 8 (eight) hours as needed.      ondansetron (ZOFRAN) 2 mg/mL injection Inject 4 mg into the vein every 6 (six) hours as needed.      ondansetron (ZOFRAN-ODT) 8 MG TbDL Take 1 tablet (8 mg total) by mouth every 8 (eight) hours as needed.      oxyCODONE (ROXICODONE) 10 mg Tab immediate release tablet Take 10 mg by mouth every 6 (six) hours as needed for Pain.      oxyCODONE-acetaminophen (PERCOCET) 5-325 mg per tablet Take 1 tablet by mouth every 4 (four) hours as needed for Pain. 15 tablet 0    polyethylene glycol (GLYCOLAX) 17 gram PwPk Take 17 g by mouth daily as needed (Constipation). (Patient taking differently: Take 17 g by mouth 3 (three) times daily as needed (Constipation). )       vancomycin HCl in 5 % dextrose (VANCOMYCIN IN DEXTROSE 5 %) 2 gram/500 mL Soln Inject 500 mLs (2,000 mg total) into the vein once daily. Vanc 2000 mg Q 24 at 2000. Vanc trough needs to be drawn on 04/25 at 1930, 30 min before the 4th dose. End on 5/10. for 18 days          ascorbic acid (vitamin C)  500 mg Oral Daily    aspirin  325 mg Oral Daily    atorvastatin  20 mg Oral Daily    baclofen  10 mg Oral TID    divalproex  125 mg Oral Q8H    DULoxetine  60 mg Oral Daily    folic acid  1,000 mcg Oral Daily    gabapentin  300 mg Oral TID    insulin detemir U-100  25 Units Subcutaneous BID    QUEtiapine  50 mg Oral QHS    senna-docusate 8.6-50 mg  1 tablet Oral BID    traZODone  25 mg Oral QHS       ALLERGIES:  Review of patient's allergies indicates:  No Known Allergies    REVIEW OF SYSTEMS:  Review of Systems   Constitutional: Negative for diaphoresis, fever and weight loss.   Respiratory: Positive for shortness of breath.    Cardiovascular: Positive for chest pain.   Gastrointestinal: Negative for blood in stool.   Genitourinary: Negative for hematuria.   Endo/Heme/Allergies: Does not bruise/bleed easily.   All other systems reviewed and are negative.      OBJECTIVE:    PHYSICAL EXAMINATION:   Vitals:    05/10/20 0803   BP:    Pulse: 91   Resp: 18   Temp:        Physical Exam:  Vitals reviewed.    Eyes: Pupils are equal, round, and reactive to light. Conjunctivae and EOM are normal.     Cardiovascular: Decreased distal pulses and edema.     Abdominal: Soft.     Skin: Skin displays no rash on trunk and no rash on extremities. Skin displays no lesions on trunk and no lesions on extremities.     Psych/Behavior: He is alert. He is oriented to person, place, and time.     Musculoskeletal:        Neck: Range of motion is full.     Neurological:        Sensory:   Allodynia, hyperalgesia on palpation of feet and hands       DTRs: Tricep reflexes are 0 on the right side and 0 on the left side. Bicep reflexes are 0  on the right side and 0 on the left side. Brachioradialis reflexes are 0 on the right side and 0 on the left side. Patellar reflexes are 0 on the right side and 0 on the left side. Achilles reflexes are 0 on the right side and 0 on the left side.       Back Exam     Tenderness   The patient is experiencing tenderness in the lumbar.    Muscle Strength   Right Quadriceps:  5/5   Left Quadriceps:  5/5   Right Hamstrings:  5/5   Left Hamstrings:  5/5     Tests   Straight leg raise right: negative  Straight leg raise left: negative            SI joint:   Palpation at the right and left SI joints not painful  MAGGI test is negative bilaterally  Gaenslen test is negative bilaterally  Thigh thrust test is negative bilaterally    Neurologic Exam     Mental Status   Oriented to person, place, and time.   Speech: speech is normal   Level of consciousness: alert    Cranial Nerves   Cranial nerves II through XII intact.     CN III, IV, VI   Pupils are equal, round, and reactive to light.  Extraocular motions are normal.     Motor Exam   Muscle bulk: decreased  Overall muscle tone: decreased    Strength   Right deltoid: 5/5  Left deltoid: 5/5  Right biceps: 5/5  Left biceps: 5/5  Right triceps: 5/5  Left triceps: 5/5  Right wrist flexion: 5/5  Left wrist flexion: 5/5  Right wrist extension: 5/5  Left wrist extension: 5/5  Right interossei: 4/5  Left interossei: 4/5  Right iliopsoas: 5/5  Left iliopsoas: 5/5  Right quadriceps: 5/5  Left quadriceps: 5/5  Right hamstrin/5  Left hamstrin/5  Right anterior tibial: 2/5  Left anterior tibial: 2/5  Right posterior tibial: 2/5  Left posterior tibial: 2/5  Right peroneal: 2/5  Left peroneal: 2/5  Right gastroc: 2/5  Left gastroc: 2/5    Sensory Exam   Allodynia, hyperalgesia on palpation of feet and hands     Gait, Coordination, and Reflexes     Reflexes   Right brachioradialis: 0  Left brachioradialis: 0  Right biceps: 0  Left biceps: 0  Right triceps: 0  Left triceps: 0  Right  patellar: 0  Left patellar: 0  Right achilles: 0  Left achilles: 0  Right plantar: normal  Left plantar: normal  Right Rodriguez: absent  Left Rodriguez: absent  Right ankle clonus: absent  Left ankle clonus: absent        DIAGNOSTIC DATA:    Recent Labs     05/08/20  0934 05/09/20  1404 05/10/20  0321   HGB 11.1* 9.2* 9.1*   HCT 38.6* 30.7* 29.8*   MCV 91 90 88   WBC 6.93 5.97 4.85    250 221    136 135*   K 4.3 4.0 4.3    101 102    126* 389*   BUN 29* 32* 27*   CREATININE 1.7* 1.2 1.0   CALCIUM 9.9 8.7 8.7   MG 2.3 2.1 2.3   * 102*  --    AST 42* 30  --    ALBUMIN 3.5 3.5  --    BILITOT 0.4 0.8  --    INR  --  1.0  --        Microbiology Results (last 7 days)     ** No results found for the last 168 hours. **          I personally interpreted the following imaging:    CT of the lumbar spine done yesterday shows mild superior endplate, less than 50% fracture of L1 without abnormal angulation    ASSESMENT:  This is a 54 y.o. male with acute superior endplate of L1 fracture, stable.  Known diabetic polyneuropathy most likely explaining the hands and feet weakness and allodynia.    PLAN:  No surgical intervention indicated at this time  Recommending conservative management with:  LSO brace to wear when sitting or standing. Will call [facility] to order back brace.  Repeat lumbar x-ray standing in brace before discharge.  Please call Neurosurgery when lumbar x-rays done.  Recommending outpatient follow-up in Neurosurgery in 3-4 weeks with repeat lumbar x-ray.  Recommending increasing gabapentin to 600 mg 3 times daily for neuropathic pain caused by diabetic neuropathy  All questions answered    Kaleb Longoria MD  Cell: [phone]

## 2020-05-10 NOTE — CONSULTS
[facility]  Cardiology  Consult Note    Patient Name: Cali Mabry  MRN: [id number]  Admission Date: 5/9/2020  Hospital Length of Stay: 0 days  Code Status: Full Code   Attending Provider: John Bonilla MD   Consulting Provider: Crystal Cheng MD  Primary Care Physician: Cullen Zimmer MD  Principal Problem:Chest pain    Patient information was obtained from patient, past medical records and ER records.     Inpatient consult to Cardiology  Consult performed by: Crystal Cheng MD  Consult ordered by: Hans Phillip MD        Subjective:     REASON FOR CONSULT:  Chest pain     HPI:  54-year-old male with a past medical history significant for chronic pain issues, hepatitis-C, history of IV drug abuse, pancreatitis, coronary artery disease status post stent placement by history, was admitted to the hospital according to the hospitalist history and physical with chest pain.  Patient however denies any chest pain to me.  He reports that he fell about 2 or 3 days ago and subsequently started having left shoulder pain.  He has shoulder pain whenever he moves his shoulder.  EKG showed sinus tachycardia with no ischemic ST T wave changes.  Serial troponins are negative.  During the entire course of my interview of the patient's main complaint was low back pain and he was seeking pain medications for the same.    Past Medical History:   Diagnosis Date    Acute on chronic pancreatitis 4/3/2016    DKA (diabetic ketoacidoses) 02/2020    DM (diabetes mellitus), type 1     HCV (hepatitis C virus)     high VL     HTN (hypertension)     IVDU (intravenous drug user)     Heroin    Pancreatitis        Past Surgical History:   Procedure Laterality Date    CARDIAC SURGERY  1999    stent placed. ([facility])    ESOPHAGOGASTRODUODENOSCOPY N/A 3/5/2020    Gastritis.  No H pylori.  Completed PPI for 1 month.  Procedure: EGD (ESOPHAGOGASTRODUODENOSCOPY);  Surgeon: Brinda Urbina  MD Anju;  Location: [facility];  Service: Endoscopy;  Laterality: N/A;  [id number]; [phone]    SHOULDER SURGERY  2013    right shoulder, spur removal.       Review of patient's allergies indicates:  No Known Allergies    Current Facility-Administered Medications on File Prior to Encounter   Medication    [MAR Hold - Suspended Admission] acetaminophen tablet 650 mg    [MAR Hold - Suspended Admission] albuterol-ipratropium 2.5 mg-0.5 mg/3 mL nebulizer solution 3 mL    [MAR Hold - Suspended Admission] ALPRAZolam tablet 0.5 mg    [MAR Hold - Suspended Admission] atorvastatin tablet 20 mg    [MAR Hold - Suspended Admission] B complex-vitamin C-folic acid 0.8 mg Tab 0.8 mg    [MAR Hold - Suspended Admission] baclofen tablet 10 mg    [MAR Hold - Suspended Admission] bisacodyL suppository 10 mg    [MAR Hold - Suspended Admission] calcium carbonate 200 mg calcium (500 mg) chewable tablet 500 mg    [MAR Hold - Suspended Admission] dextrose 10 % infusion    [MAR Hold - Suspended Admission] dextrose 10 % infusion    [MAR Hold - Suspended Admission] dextrose 50% injection 12.5 g    [MAR Hold - Suspended Admission] dextrose 50% injection 12.5 g    [MAR Hold - Suspended Admission] dextrose 50% injection 25 g    [MAR Hold - Suspended Admission] dextrose 50% injection 25 g    [MAR Hold - Suspended Admission] diphenhydrAMINE capsule 25 mg    [MAR Hold - Suspended Admission] divalproex capsule 125 mg    [MAR Hold - Suspended Admission] DULoxetine DR capsule 60 mg    [MAR Hold - Suspended Admission] gabapentin capsule 300 mg    [MAR Hold - Suspended Admission] glucagon (human recombinant) injection 1 mg    [MAR Hold - Suspended Admission] glucose chewable tablet 16 g    [MAR Hold - Suspended Admission] glucose chewable tablet 24 g    [MAR Hold - Suspended Admission] haloperidol lactate injection 1 mg    [MAR Hold - Suspended Admission] hydroxyzine HCL tablet 25 mg    [MAR Hold - Suspended Admission]  L.acidophil,parac-S.therm-Bif. (Risaquad) Cap 1 capsule    [MAR Hold - Suspended Admission] melatonin tablet 6 mg    [MAR Hold - Suspended Admission] mineral oil enema 1 enema    [MAR Hold - Suspended Admission] morphine injection 1 mg    [MAR Hold - Suspended Admission] multivitamin tablet    [MAR Hold - Suspended Admission] ondansetron injection 4 mg    [MAR Hold - Suspended Admission] oxyCODONE immediate release tablet Tab 10 mg    [MAR Hold - Suspended Admission] polyethylene glycol packet 17 g    [MAR Hold - Suspended Admission] prochlorperazine injection Soln 5 mg    [MAR Hold - Suspended Admission] QUEtiapine tablet 50 mg    [MAR Hold - Suspended Admission] sodium chloride 0.9% flush 10 mL    [MAR Hold - Suspended Admission] sodium chloride 0.9% flush 10 mL    [MAR Hold - Suspended Admission] trazodone split tablet 25 mg    [MAR Hold - Suspended Admission] vancomycin - pharmacy to dose    [MAR Hold - Suspended Admission] vancomycin 1.5 g in  mL IVPB (ready to mix)    [DISCONTINUED] insulin aspart U-100 pen 1-10 Units    [DISCONTINUED] insulin aspart U-100 pen 10 Units    [DISCONTINUED] insulin detemir U-100 pen 25 Units     Current Outpatient Medications on File Prior to Encounter   Medication Sig    acetaminophen (TYLENOL) 650 MG TbSR Take 650 mg by mouth every 8 (eight) hours as needed.    albuterol-ipratropium (DUO-NEB) 2.5 mg-0.5 mg/3 mL nebulizer solution Take 3 mLs by nebulization every 4 (four) hours as needed for Wheezing. Rescue    ALPRAZolam (XANAX) 0.5 MG tablet Take 0.5 mg by mouth 2 (two) times daily as needed for Anxiety.    ASCORBIC ACID, VITAMIN C, ORAL Take 1 tablet by mouth once daily.    atorvastatin (LIPITOR) 20 MG tablet Take 20 mg by mouth once daily.    b complex vitamins tablet Take 1 tablet by mouth once daily.    baclofen (LIORESAL) 10 MG tablet Take 10 mg by mouth 3 (three) times daily.    divalproex (DEPAKOTE) 125 MG EC tablet Take 125 mg by mouth  every 8 (eight) hours.    DULoxetine (CYMBALTA) 60 MG capsule Take 1 capsule (60 mg total) by mouth once daily.    folic acid (FOLVITE) 800 MCG Tab Take 800 mcg by mouth once daily.    gabapentin (NEURONTIN) 300 MG capsule Take 1 capsule (300 mg total) by mouth 3 (three) times daily.    hydroxyzine HCL (ATARAX) 25 MG tablet Take 1 tablet (25 mg total) by mouth 3 (three) times daily as needed for Anxiety.    insulin aspart U-100 (NOVOLOG) 100 unit/mL injection Inject 1-10 Units into the skin 3 (three) times daily with meals.    insulin detemir U-100 (LEVEMIR) 100 unit/mL injection Inject 25 Units into the skin 2 (two) times a day.    L. acidophilus/Strept/La p-whit[place] (RISAQUAD ORAL) Take 1 capsule by mouth once daily.    melatonin (MELATIN) 3 mg tablet Take 2 tablets (6 mg total) by mouth nightly as needed for Insomnia.    multivitamin (THERAGRAN) per tablet Take 1 tablet by mouth once daily.    QUEtiapine (SEROQUEL) 50 MG tablet Take 50 mg by mouth every evening.    traZODone (DESYREL) 50 MG tablet Take 25 mg by mouth every evening.    bisacodyL (DULCOLAX) 10 mg Supp Place 10 mg rectally daily as needed.    blood sugar diagnostic Strp Use To check blood glucose three times daily,    blood-glucose meter Misc To check Blood glucose three times daily,    calcium carbonate (TUMS) 200 mg calcium (500 mg) chewable tablet Take 1 tablet (500 mg total) by mouth daily as needed for Heartburn.    diphenhydrAMINE (BENADRYL) 25 mg capsule Take 25 mg by mouth every 6 (six) hours as needed for Itching.    docusate sodium (COLACE) 100 MG capsule Take 1 capsule (100 mg total) by mouth 2 (two) times daily as needed for Constipation.    folic acid-vit B6-vit B12 2.5-25-2 mg (FOLBIC OR EQUIV) 2.5-25-2 mg Tab Take 1 tablet by mouth once daily.    glucagon, human recombinant, (GLUCAGEN HYPOKIT) 1 mg SolR Inject 1 mg into the muscle as needed.    glucose 4 GM chewable tablet Take 16 g by mouth as needed for Low blood  sugar.    HALOPERIDOL LACTATE INJ Inject 1 mg as directed every 4 (four) hours as needed.    insulin lispro 100 unit/mL injection Inject 10 Units into the skin 3 (three) times daily before meals.    lancets Misc To check BG 3 times daily, to use with insurance preferred meter    LANTUS U-100 INSULIN 100 unit/mL injection Inject 22 Units into the skin 2 (two) times daily.    lidocaine (LIDODERM) 5 % Place 1 patch onto the skin once daily. Remove & Discard patch within 12 hours or as directed by MD.  Apply to shoulder for pain    lidocaine (LIDODERM) 5 % Place 1 patch onto the skin daily as needed. Remove & Discard patch within 12 hours or as directed by MD.  Apply to back for pain    mineral oil (FLEET OIL RETENTION) enema Place 1 enema rectally daily as needed for Constipation.    morphine sulfate (MORPHINE INJ) Inject 1 mg as directed every 8 (eight) hours as needed.    ondansetron (ZOFRAN) 2 mg/mL injection Inject 4 mg into the vein every 6 (six) hours as needed.    ondansetron (ZOFRAN-ODT) 8 MG TbDL Take 1 tablet (8 mg total) by mouth every 8 (eight) hours as needed.    oxyCODONE (ROXICODONE) 10 mg Tab immediate release tablet Take 10 mg by mouth every 6 (six) hours as needed for Pain.    oxyCODONE-acetaminophen (PERCOCET) 5-325 mg per tablet Take 1 tablet by mouth every 4 (four) hours as needed for Pain.    polyethylene glycol (GLYCOLAX) 17 gram PwPk Take 17 g by mouth daily as needed (Constipation). (Patient taking differently: Take 17 g by mouth 3 (three) times daily as needed (Constipation). )    vancomycin HCl in 5 % dextrose (VANCOMYCIN IN DEXTROSE 5 %) 2 gram/500 mL Soln Inject 500 mLs (2,000 mg total) into the vein once daily. Vanc 2000 mg Q 24 at 2000. Vanc trough needs to be drawn on 04/25 at 1930, 30 min before the 4th dose. End on 5/10. for 18 days       Scheduled Meds:   ascorbic acid (vitamin C)  500 mg Oral Daily    aspirin  325 mg Oral Daily    atorvastatin  20 mg Oral Daily     baclofen  10 mg Oral TID    divalproex  125 mg Oral Q8H    DULoxetine  60 mg Oral Daily    folic acid  1,000 mcg Oral Daily    gabapentin  300 mg Oral TID    insulin detemir U-100  25 Units Subcutaneous BID    QUEtiapine  50 mg Oral QHS    senna-docusate 8.6-50 mg  1 tablet Oral BID    traZODone  25 mg Oral QHS     Continuous Infusions:  PRN Meds:.acetaminophen, albuterol-ipratropium, ALPRAZolam, calcium carbonate, calcium chloride IVPB, calcium chloride IVPB, calcium chloride IVPB, dextrose 50%, dextrose 50%, diphenhydrAMINE, docusate sodium, glucagon (human recombinant), glucose, glucose, haloperidol lactate, hydroxyzine HCL, insulin aspart U-100, lidocaine, magnesium oxide, magnesium sulfate IVPB, magnesium sulfate IVPB, magnesium sulfate IVPB, magnesium sulfate IVPB, melatonin, ondansetron, oxyCODONE, polyethylene glycol, potassium chloride in water, potassium chloride in water, potassium chloride in water, potassium chloride in water, potassium chloride, potassium chloride, potassium chloride, potassium chloride, sodium chloride 0.9%, sodium phosphate IVPB, sodium phosphate IVPB, sodium phosphate IVPB, sodium phosphate IVPB, sodium phosphate IVPB, Pharmacy to dose Vancomycin consult **AND** vancomycin - pharmacy to dose    Family History     Problem Relation (Age of Onset)    Asthma Mother          Tobacco Use    Smoking status: Former Smoker     Packs/day: 1.00     Years: 20.00     Pack years: 20.00     Types: Cigarettes     Start date: 4/3/1981     Last attempt to quit: 10/2019     Years since quittin.6    Smokeless tobacco: Never Used   Substance and Sexual Activity    Alcohol use: No     Alcohol/week: 0.0 standard drinks    Drug use: Yes     Types: IV, Marijuana, Heroin     Comment: currently, used today, herion    Sexual activity: Yes     Partners: Female       ROS  Objective:     Vital Signs (Most Recent):  Temp: 97.7 °F (36.5 °C) (05/10/20 0724)  Pulse: 91 (05/10/20 0803)  Resp: 18  (05/10/20 0803)  BP: 118/60 (05/10/20 0724)  SpO2: (!) 94 % (05/10/20 0803) Vital Signs (24h Range):  Temp:  [97.5 °F (36.4 °C)-98 °F (36.7 °C)] 97.7 °F (36.5 °C)  Pulse:  [] 91  Resp:  [15-34] 18  SpO2:  [94 %-100 %] 94 %  BP: (117-164)/() 118/60     Weight: 68.4 kg (150 lb 12.7 oz)  Body mass index is 22.93 kg/m².    SpO2: (!) 94 %  O2 Device (Oxygen Therapy): room air      Intake/Output Summary (Last 24 hours) at 5/10/2020 0958  Last data filed at 5/10/2020 0400  Gross per 24 hour   Intake 300 ml   Output 400 ml   Net -100 ml       Lines/Drains/Airways     Peripheral Intravenous Line                 Midline Catheter Insertion/Assessment  - Single Lumen 04/13/20 0946 Left brachial vein 18g x 8cm 27 days         Peripheral IV - Single Lumen 05/09/20 1356 20 G Left Hand less than 1 day                Physical Exam  HEENT: Normocephalic, atraumatic, PERRL, Conjunctiva pink, no scleral icterus.   CVS: S1S2+, RRR, no murmurs, rubs or gallops, JVP: Normal.  LUNGS: Clear  ABDOMEN: Soft, NT, BS+  EXTREMITIES: No cyanosis, clubbing or edema  NEURO: AAO X 3.       Significant Labs:   Blood Culture: No results for input(s): LABBLOO in the last 48 hours., BMP:   Recent Labs   Lab 05/09/20  1404 05/10/20  0321   * 389*    135*   K 4.0 4.3    102   CO2 27 26   BUN 32* 27*   CREATININE 1.2 1.0   CALCIUM 8.7 8.7   MG 2.1 2.3   , CMP   Recent Labs   Lab 05/09/20  1404 05/10/20  0321    135*   K 4.0 4.3    102   CO2 27 26   * 389*   BUN 32* 27*   CREATININE 1.2 1.0   CALCIUM 8.7 8.7   PROT 8.1  --    ALBUMIN 3.5  --    BILITOT 0.8  --    ALKPHOS 107  --    AST 30  --    *  --    ANIONGAP 8 7*   ESTGFRAFRICA >60.0 >60.0   EGFRNONAA >60.0 >60.0   , CBC   Recent Labs   Lab 05/09/20  1404 05/10/20  0321   WBC 5.97 4.85   HGB 9.2* 9.1*   HCT 30.7* 29.8*    221   , INR   Recent Labs   Lab 05/09/20  1404   INR 1.0   , Lipid Panel No results for input(s): CHOL, HDL, LDLCALC,  TRIG, CHOLHDL in the last 48 hours. and Troponin   Recent Labs   Lab 05/09/20  1404 05/09/20  1802 05/09/20  2155   TROPONINI <0.030 <0.030 <0.030       Significant Imaging: Reviewed  Assessment and Plan:     IMPRESSION:  Left shoulder pain.  Likely musculoskeletal.  Severe low back pain.  Neurosurgery consulted.  History of coronary artery disease status post stent placement.  Further details not available.  EKG with no ischemic changes and serial troponins are negative.  Patient denies any chest pain to me at this point in time.  Peripheral vascular disease.  History of hepatitis-C.  History of IV drug abuse.  History of pneumonia.  History of chronic pain.    RECOMMENDATIONS:  1.  No further cardiac workup needed at this point in time.  2.  He can follow up with his primary cardiologist as an outpatient.  3.  He can follow up with vascular or his cardiologist for his peripheral vascular disease as an outpatient.  4.  Discussed with Dr. Bonilla.     Thank you for your consult. I will sign off. Please contact us if you have any additional questions.    Crystal Cheng MD  Cardiology   [facility]

## 2020-05-10 NOTE — NURSING
Pt refused tele monitoring after being advised to keep on, notified Dr. Triplett, new orders given.

## 2020-05-10 NOTE — PLAN OF CARE
05/09/20 2000   Patient Assessment/Suction   Level of Consciousness (AVPU) alert   Respiratory Effort Unlabored   Expansion/Accessory Muscles/Retractions no use of accessory muscles   All Lung Fields Breath Sounds clear   Rhythm/Pattern, Respiratory unlabored   Cough Frequency infrequent   Cough Type nonproductive   PRE-TX-O2   O2 Device (Oxygen Therapy) room air   SpO2 97 %   Pulse 94   Resp 18   Aerosol Therapy   $ Aerosol Therapy Charges PRN treatment not required   Respiratory Treatment Status (SVN) PRN treatment not required   Respiratory Evaluation   $ Care Plan Tech Time 15 min   Evaluation For   (care plan)

## 2020-05-10 NOTE — PROGRESS NOTES
[facility] Medicine Progress Note  Patient Name: Cali Mabry MRN: [id number]   Patient Class: OP- Observation  Length of Stay: 0   Admission Date: 5/9/2020  1:38 PM Attending Physician: John Bonilla MD   Primary Care Provider: Cullen Zimmer MD Face-to-Face encounter date: 05/10/2020   Chief Complaint: Chest Pain    Assessment & Plan:   Cali Mabry is a 54 y.o. male admitted for    1. Back pain  2. Acute superior endplate L1 fracture  3. Left Shoulder pain  4. Diabetic Polyneuropathy  5. Coronary artery disease s/p stent  6. Peripheral vascular disease.  7. History of hepatitis-C.  8. History of IV drug abuse.  9. History of pneumonia.    Plan:   No intervention planned by cardiology  Neurosurgery saw the patient and recommended LSO brace   Repeat Lumbar X-ray after lumbar brace  Lumbar brace to be arranged tomorrow  F/u with NS in 3-4 weeks.  Increased Gabapentin as per their recommendations    Discharge Planning:   Anticipate discharge tomorrow    Subjective:    Interval History: Patient is very rude. Yelled at me that you are using the same steth that you have been using on hundreds of people. THERE  IS NOTHING WRONG WITH MY HEART. WHY EVERY ONE WALKS IN MY ROOM AND ASKS ME ABOUT MY HEART. I JUST NEED PAIN MEDICATIONS. I DON'T ANY ONE IN THE HOSPITAL TO TOUCH ME. I did tell him that we have increased gabapentin. NO I WANT MORPHINE.     WHEN I TOLD HIM THAT WHY HE IS YELLING AND IF THERE IS ANY THING I CAN DO TO MAKE HIM COMFORTABLE, HE SAID THAT'S HOW I AM, AND THAT'S HOW I TALK WITH EVERY ONE.       No family present at bedside.No concerns/issues overnight reported by the patient or the nursing staff.    Review of Systems All other Review of Systems were found to be negative expect for that mentioned already in HPI.   Objective:   Physical Exam  /60 (BP Location: Right arm, Patient Position: Lying)   Pulse 91   Temp 97.7 °F (36.5 °C) (Oral)   Resp 18   Ht 5'  "8" (1.727 m)   Wt 68.4 kg (150 lb 12.7 oz)   SpO2 (!) 94%   BMI 22.93 kg/m²   Vitals reviewed.    Constitutional: Very agitated and rude  HENT: Atraumatic.   Cardiovascular: Normal rate, regular rhythm and normal heart sounds.   Pulmonary/Chest: Effort normal. Clear to auscultation bilaterally. No wheezes.   Abdominal: Soft. Bowel sounds are normal. Exhibits no distension and no mass. No tenderness  Neurological: Alert.   Skin: Skin is warm and dry.     Following labs were Reviewed   Recent Labs   Lab 05/09/20  1404 05/10/20  0321   WBC 5.97 4.85   HGB 9.2* 9.1*   HCT 30.7* 29.8*    221   CALCIUM 8.7 8.7   ALBUMIN 3.5  --    PROT 8.1  --     135*   K 4.0 4.3   CO2 27 26    102   BUN 32* 27*   CREATININE 1.2 1.0   ALKPHOS 107  --    *  --    AST 30  --    BILITOT 0.8  --      POCT Glucose   Date Value Ref Range Status   05/09/2020 366 (H) 70 - 110 mg/dL Final   05/09/2020 100 70 - 110 mg/dL Final   05/08/2020 85 70 - 110 mg/dL Final   05/08/2020 104 70 - 110 mg/dL Final   05/08/2020 275 (H) 70 - 110 mg/dL Final   05/08/2020 82 70 - 110 mg/dL Final   05/08/2020 67 (L) 70 - 110 mg/dL Final   05/07/2020 136 (H) 70 - 110 mg/dL Final   05/07/2020 125 (H) 70 - 110 mg/dL Final        All labs within the past 24 hours have been reviewed  Microbiology Results (last 7 days)     ** No results found for the last 168 hours. **        CTA Chest Non-Coronary - PE Study   Final Result      X-Ray Chest AP Portable   Final Result          Inpatient medications  Scheduled Meds:   ascorbic acid (vitamin C)  500 mg Oral Daily    aspirin  325 mg Oral Daily    atorvastatin  20 mg Oral Daily    baclofen  10 mg Oral TID    divalproex  125 mg Oral Q8H    DULoxetine  60 mg Oral Daily    folic acid  1,000 mcg Oral Daily    gabapentin  600 mg Oral TID    insulin detemir U-100  25 Units Subcutaneous BID    QUEtiapine  50 mg Oral QHS    senna-docusate 8.6-50 mg  1 tablet Oral BID    traZODone  25 mg Oral " QHS     Continuous Infusions:  PRN Meds:.acetaminophen, albuterol-ipratropium, ALPRAZolam, calcium carbonate, calcium chloride IVPB, calcium chloride IVPB, calcium chloride IVPB, dextrose 50%, dextrose 50%, diphenhydrAMINE, docusate sodium, glucagon (human recombinant), glucose, glucose, haloperidol lactate, hydroxyzine HCL, insulin regular, lidocaine, magnesium oxide, magnesium sulfate IVPB, magnesium sulfate IVPB, magnesium sulfate IVPB, magnesium sulfate IVPB, melatonin, ondansetron, oxyCODONE, polyethylene glycol, potassium chloride in water, potassium chloride in water, potassium chloride in water, potassium chloride in water, potassium chloride, potassium chloride, potassium chloride, potassium chloride, sodium chloride 0.9%, sodium phosphate IVPB, sodium phosphate IVPB, sodium phosphate IVPB, sodium phosphate IVPB, sodium phosphate IVPB, Pharmacy to dose Vancomycin consult **AND** vancomycin - pharmacy to dose    Above encounter included review of the medical records, interviewing and examining the patient face-to-face, discussion with family and other health care providers, ordering and interpreting lab/test results and formulating a plan of care.     Medical Decision Making:    [] Low Complexity  [x] Moderate Complexity  [] High Complexity    John Bonilla  [facility] Hospitalist  05/10/2020

## 2020-05-11 VITALS
OXYGEN SATURATION: 96 % | SYSTOLIC BLOOD PRESSURE: 122 MMHG | RESPIRATION RATE: 20 BRPM | WEIGHT: 150.81 LBS | TEMPERATURE: 98 F | HEART RATE: 102 BPM | DIASTOLIC BLOOD PRESSURE: 72 MMHG | HEIGHT: 68 IN | BODY MASS INDEX: 22.86 KG/M2

## 2020-05-11 LAB
GLUCOSE SERPL-MCNC: 298 MG/DL (ref 70–110)
GLUCOSE SERPL-MCNC: 482 MG/DL (ref 70–110)
GLUCOSE SERPL-MCNC: 495 MG/DL (ref 70–110)
GLUCOSE SERPL-MCNC: 532 MG/DL (ref 70–110)
GLUCOSE SERPL-MCNC: 55 MG/DL (ref 70–110)

## 2020-05-11 PROCEDURE — 96375 TX/PRO/DX INJ NEW DRUG ADDON: CPT

## 2020-05-11 PROCEDURE — 63600175 PHARM REV CODE 636 W HCPCS: Performed by: FAMILY MEDICINE

## 2020-05-11 PROCEDURE — 96372 THER/PROPH/DIAG INJ SC/IM: CPT | Mod: 59

## 2020-05-11 PROCEDURE — 25000003 PHARM REV CODE 250: Performed by: NURSE PRACTITIONER

## 2020-05-11 PROCEDURE — 63600175 PHARM REV CODE 636 W HCPCS: Mod: GZ | Performed by: INTERNAL MEDICINE

## 2020-05-11 PROCEDURE — 99900035 HC TECH TIME PER 15 MIN (STAT)

## 2020-05-11 PROCEDURE — G0378 HOSPITAL OBSERVATION PER HR: HCPCS

## 2020-05-11 PROCEDURE — 25000003 PHARM REV CODE 250: Performed by: INTERNAL MEDICINE

## 2020-05-11 PROCEDURE — 94761 N-INVAS EAR/PLS OXIMETRY MLT: CPT

## 2020-05-11 RX ORDER — HYDROMORPHONE HYDROCHLORIDE 1 MG/ML
0.5 INJECTION, SOLUTION INTRAMUSCULAR; INTRAVENOUS; SUBCUTANEOUS ONCE
Status: COMPLETED | OUTPATIENT
Start: 2020-05-11 | End: 2020-05-11

## 2020-05-11 RX ORDER — LORAZEPAM 2 MG/ML
0.5 INJECTION INTRAMUSCULAR ONCE
Status: COMPLETED | OUTPATIENT
Start: 2020-05-11 | End: 2020-05-11

## 2020-05-11 RX ORDER — ASPIRIN 325 MG
325 TABLET, DELAYED RELEASE (ENTERIC COATED) ORAL DAILY
Qty: 360 TABLET | Refills: 0 | Status: ON HOLD | OUTPATIENT
Start: 2020-05-12 | End: 2020-09-28 | Stop reason: HOSPADM

## 2020-05-11 RX ORDER — GABAPENTIN 300 MG/1
600 CAPSULE ORAL 3 TIMES DAILY
Qty: 180 CAPSULE | Refills: 11 | Status: ON HOLD | OUTPATIENT
Start: 2020-05-11 | End: 2022-09-20

## 2020-05-11 RX ORDER — HYDROMORPHONE HYDROCHLORIDE 1 MG/ML
0.5 INJECTION, SOLUTION INTRAMUSCULAR; INTRAVENOUS; SUBCUTANEOUS ONCE
Status: DISCONTINUED | OUTPATIENT
Start: 2020-05-11 | End: 2020-05-11

## 2020-05-11 RX ADMIN — BACLOFEN 10 MG: 10 TABLET ORAL at 03:05

## 2020-05-11 RX ADMIN — GABAPENTIN 600 MG: 300 CAPSULE ORAL at 03:05

## 2020-05-11 RX ADMIN — LORAZEPAM 0.5 MG: 2 INJECTION INTRAMUSCULAR; INTRAVENOUS at 01:05

## 2020-05-11 RX ADMIN — HALOPERIDOL LACTATE 2 MG: 5 INJECTION, SOLUTION INTRAMUSCULAR at 06:05

## 2020-05-11 RX ADMIN — HYDROMORPHONE HYDROCHLORIDE 0.5 MG: 1 INJECTION, SOLUTION INTRAMUSCULAR; INTRAVENOUS; SUBCUTANEOUS at 01:05

## 2020-05-11 RX ADMIN — DIVALPROEX SODIUM 125 MG: 125 CAPSULE, COATED PELLETS ORAL at 06:05

## 2020-05-11 RX ADMIN — HUMAN INSULIN 18 UNITS: 100 INJECTION, SOLUTION SUBCUTANEOUS at 04:05

## 2020-05-11 RX ADMIN — OXYCODONE HYDROCHLORIDE 10 MG: 5 TABLET ORAL at 03:05

## 2020-05-11 NOTE — PT/OT/SLP PROGRESS
Occupational Therapy      Patient Name:  Cali Mabry   MRN:  [id number]    Patient not seen today secondary to Other (Comment)(Unable to participate (lethargic due to Haldol).). Will follow-up tomorrow.    Mitch Paez OT  5/11/2020

## 2020-05-11 NOTE — PT/OT/SLP PROGRESS
Physical Therapy      Patient Name:  Cali Mabry   MRN:  [id number]    Patient not seen today secondary to Other (Comment). Pt with code white this AM; RN states pt sedated and resting and not cleared for PT treatment; Also, LSO has not been delivered yet. Will follow-up tomorrow.    Vicki Castle, PT

## 2020-05-11 NOTE — DISCHARGE SUMMARY
[facility]  Discharge Summary  Patient Name: Cali Mabry MRN: [id number]   Patient Class: OP- Observation  Length of Stay: 0   Admission Date: 5/9/2020  1:38 PM Attending Physician: John Bonilla MD   Primary Care Provider: Cullen Zimmer MD Face-to-Face encounter date: 05/11/2020   Chief Complaint: Chest Pain    Date of Discharge: 5/11/2020  Discharge Disposition: Home  Condition: Stable    Reason for Hospitalization   Primary Diagnosis: Back pain due to superior endplate fracture of L1.    Secondary Diagnosis: Chest pain, cardiac etiology ruled out      Patient Active Problem List   Diagnosis    Intractable abdominal pain    Type 1 diabetes mellitus with hyperglycemia    Essential hypertension    Anemia of chronic disease    Hyperlipidemia    Intravenous drug abuse    Chronic hepatitis C    Chronic relapsing pancreatitis    Tobacco abuse    Cocaine abuse    Marijuana abuse    Opioid abuse    Elevated troponin    Pulmonary nodule    Bacterial pneumonia    Septic embolism    Parapneumonic effusion    Pain of left lower extremity    Other emphysema    Transaminitis    YONAS (generalized anxiety disorder)    Mild episode of recurrent major depressive disorder    Insomnia    Empyema lung    Polysubstance abuse    Moderate protein malnutrition    Cavitary pneumonia    Hyperglycemia    Agitation    Chest pain    Closed compression fracture of body of L1 vertebra    Hypoglycemia       Brief History of Present Illness    Cali Mabry is a 54 y.o. male who  has a past medical history of Acute on chronic pancreatitis (4/3/2016), DKA (diabetic ketoacidoses) (02/2020), DM (diabetes mellitus), type 1, HCV (hepatitis C virus), HTN (hypertension), IVDU (intravenous drug user), and Pancreatitis.. The patient presented to [facility] on 5/9/2020 with a primary complaint of Chest Pain      For the full HPI please refer to the History & Physical from this  "admission.    Hospital Course By Problem with Pertinent Findings     This is a 54-year-old male admitted for chest pain rule out and back pain. Cardiology evaluated the patient and he denied any chest pain and reported it was more shoulder and back pain. They cleared him from cardiology stand poin. For his back pain he was seen by neurosurgery who repeated Lumbar brace and outpatient follow up in the clinic. Patient will be discharged home in stable condition.    Physical Exam  /72 (BP Location: Right arm, Patient Position: Lying)   Pulse 102   Temp 97.6 °F (36.4 °C) (Oral)   Resp 20   Ht 5' 8" (1.727 m)   Wt 68.4 kg (150 lb 12.7 oz)   SpO2 96%   BMI 22.93 kg/m²   Vitals reviewed.    Constitutional: No distress.   HENT: Atraumatic.   Cardiovascular: Normal rate, regular rhythm and normal heart sounds.   Pulmonary/Chest: Effort normal. Clear to auscultation bilaterally. No wheezes.   Abdominal: Soft. Bowel sounds are normal. Exhibits no distension and no mass. No tenderness  Neurological: Alert.   Skin: Skin is warm and dry.     Following labs were Reviewed   No results for input(s): WBC, HGB, HCT, PLT, GLUCOSE, CALCIUM, ALBUMIN, PROT, NA, K, CO2, CL, BUN, CREATININE, ALKPHOS, ALT, AST, BILITOT in the last 24 hours.  POCT Glucose   Date Value Ref Range Status   05/09/2020 366 (H) 70 - 110 mg/dL Final   05/09/2020 100 70 - 110 mg/dL Final   05/08/2020 85 70 - 110 mg/dL Final   05/08/2020 104 70 - 110 mg/dL Final        All labs within the past 24 hours have been reviewed    Microbiology Results (last 7 days)     ** No results found for the last 168 hours. **        X-Ray Lumbar Spine Ap And Lateral   Final Result      CTA Chest Non-Coronary - PE Study   Final Result      X-Ray Chest AP Portable   Final Result          No results found for this or any previous visit.      Consultants and Procedures   Consultants:  Neurosurgery  Cardiology    Procedures:   None    Discharge Information:   Diet:  Resume " regular diet    Physical Activity:  Activity as tolerated    Instructions:  1. Take all medications as prescribed  2. Keep all follow-up appointments  3. Return to the hospital or call your primary care physicians if any worsening symptoms such as worsening back pain, issues in voiding urine occur.      Follow-Up Appointments:  1. Please call your primary care physician to schedule an appointment in 1 week time.  2. Please call your Neurosurgeon to schedule an appointment in 4 week time.     Pending laboratory work/Tests to be performed/followed by the Primary care Physician: none    The patient was discharged in the care of her parents//wife/family/caregiver, with discharge instructions were reviewed in written and verbal form. All pertinent questions were discussed and prescriptions were provided. The importance of making follow up appointments and compliance of medications has been stressed repeatedly. The patient will follow up in 1 week or sooner as needed with the PCP, and the patient is on board with the plan. Upon discharge, patient needs to be on following medications.    Discharge Medications:     Medication List      START taking these medications    aspirin 325 MG EC tablet  Commonly known as:  ECOTRIN  Take 1 tablet (325 mg total) by mouth once daily.  Start taking on:  May 12, 2020        CHANGE how you take these medications    gabapentin 300 MG capsule  Commonly known as:  NEURONTIN  Take 2 capsules (600 mg total) by mouth 3 (three) times daily.  What changed:  how much to take     polyethylene glycol 17 gram Pwpk  Commonly known as:  GLYCOLAX  Take 17 g by mouth daily as needed (Constipation).  What changed:  when to take this        CONTINUE taking these medications    acetaminophen 650 MG Tbsr  Commonly known as:  TYLENOL     albuterol-ipratropium 2.5 mg-0.5 mg/3 mL nebulizer solution  Commonly known as:  DUO-NEB     ALPRAZolam 0.5 MG tablet  Commonly known as:  XANAX     ASCORBIC ACID  (VITAMIN C) ORAL     atorvastatin 20 MG tablet  Commonly known as:  LIPITOR     b complex vitamins tablet     baclofen 10 MG tablet  Commonly known as:  LIORESAL     bisacodyL 10 mg Supp  Commonly known as:  DULCOLAX     blood sugar diagnostic Strp  Use To check blood glucose three times daily,     blood-glucose meter Misc  To check Blood glucose three times daily,     calcium carbonate 200 mg calcium (500 mg) chewable tablet  Commonly known as:  TUMS  Take 1 tablet (500 mg total) by mouth daily as needed for Heartburn.     diphenhydrAMINE 25 mg capsule  Commonly known as:  BENADRYL     divalproex 125 MG EC tablet  Commonly known as:  DEPAKOTE     docusate sodium 100 MG capsule  Commonly known as:  COLACE  Take 1 capsule (100 mg total) by mouth 2 (two) times daily as needed for Constipation.     DULoxetine 60 MG capsule  Commonly known as:  CYMBALTA  Take 1 capsule (60 mg total) by mouth once daily.     folic acid 800 MCG Tab  Commonly known as:  FOLVITE     folic acid-vit B6-vit B12 2.5-25-2 mg 2.5-25-2 mg Tab  Commonly known as:  FOLBIC or Equiv  Take 1 tablet by mouth once daily.     GLUCAGEN HYPOKIT 1 mg Solr  Generic drug:  glucagon (human recombinant)     glucose 4 GM chewable tablet     HALOPERIDOL LACTATE INJ     hydroxyzine HCL 25 MG tablet  Commonly known as:  ATARAX  Take 1 tablet (25 mg total) by mouth 3 (three) times daily as needed for Anxiety.     insulin aspart U-100 100 unit/mL injection  Commonly known as:  NOVOLOG     insulin detemir U-100 100 unit/mL injection  Commonly known as:  LEVEMIR     insulin lispro 100 unit/mL injection  Inject 10 Units into the skin 3 (three) times daily before meals.     lancets Misc  To check BG 3 times daily, to use with insurance preferred meter     LANTUS U-100 INSULIN 100 unit/mL injection  Generic drug:  insulin glargine  Inject 22 Units into the skin 2 (two) times daily.     * lidocaine 5 %  Commonly known as:  LIDODERM  Place 1 patch onto the skin once daily.  Remove & Discard patch within 12 hours or as directed by MD.  Apply to shoulder for pain     * lidocaine 5 %  Commonly known as:  LIDODERM  Place 1 patch onto the skin daily as needed. Remove & Discard patch within 12 hours or as directed by MD.  Apply to back for pain     melatonin 3 mg tablet  Commonly known as:  MELATIN  Take 2 tablets (6 mg total) by mouth nightly as needed for Insomnia.     mineral oil enema  Commonly known as:  FLEET OIL RETENTION     MORPHINE INJ     multivitamin per tablet  Commonly known as:  THERAGRAN  Take 1 tablet by mouth once daily.     ondansetron 2 mg/mL injection  Commonly known as:  ZOFRAN     ondansetron 8 MG Tbdl  Commonly known as:  ZOFRAN-ODT  Take 1 tablet (8 mg total) by mouth every 8 (eight) hours as needed.     oxyCODONE 10 mg Tab immediate release tablet  Commonly known as:  ROXICODONE     oxyCODONE-acetaminophen 5-325 mg per tablet  Commonly known as:  PERCOCET  Take 1 tablet by mouth every 4 (four) hours as needed for Pain.     QUEtiapine 50 MG tablet  Commonly known as:  SEROQUEL     RISAQUAD ORAL     traZODone 50 MG tablet  Commonly known as:  DESYREL         * This list has 2 medication(s) that are the same as other medications prescribed for you. Read the directions carefully, and ask your doctor or other care provider to review them with you.            STOP taking these medications    vancomycin in dextrose 5 % 2 gram/500 mL Soln           Where to Get Your Medications      These medications were sent to [facility]/pharmacy #[id number] - [place] - [address]  [address] [place] [address]    Phone:  [phone]   · aspirin 325 MG EC tablet  · gabapentin 300 MG capsule           I spent 30 minutes preparing the discharge including reviewing records from previous encounters, preparation of discharge summary, assessing and final examination of the patient, discharge medicine reconciliation, discussing plan of care, follow up and education and prescriptions.        John Bonilla  [facility] Hospitalist  05/11/2020

## 2020-05-11 NOTE — PLAN OF CARE
Problem: Adult Inpatient Plan of Care  Goal: Plan of Care Review  Outcome: Met  Goal: Patient-Specific Goal (Individualization)  Outcome: Met  Goal: Absence of Hospital-Acquired Illness or Injury  Outcome: Met  Intervention: Identify and Manage Fall Risk  Flowsheets (Taken 5/11/2020 1543)  Safety Promotion/Fall Prevention: assistive device/personal item within reach; bed alarm set; Fall Risk reviewed with patient/family; nonskid shoes/socks when out of bed; side rails raised x 2; instructed to call staff for mobility  Intervention: Prevent VTE (venous thromboembolism)  Flowsheets (Taken 5/11/2020 1543)  VTE Prevention/Management: ambulation promoted  Goal: Optimal Comfort and Wellbeing  Outcome: Met  Goal: Readiness for Transition of Care  Outcome: Met  Goal: Rounds/Family Conference  Outcome: Met     Problem: Fall Injury Risk  Goal: Absence of Fall and Fall-Related Injury  Outcome: Met  Intervention: Identify and Manage Contributors to Fall Injury Risk  Flowsheets (Taken 5/11/2020 1543)  Self-Care Promotion: independence encouraged     Problem: Diabetes Comorbidity  Goal: Blood Glucose Level Within Desired Range  Outcome: Met  Intervention: Maintain Glycemic Control  Flowsheets (Taken 5/11/2020 1543)  Glycemic Management: blood glucose monitoring     Problem: Fluid Imbalance (Pneumonia)  Goal: Fluid Balance  Outcome: Met     Problem: Infection (Pneumonia)  Goal: Resolution of Infection Signs/Symptoms  Outcome: Met     Problem: Respiratory Compromise (Pneumonia)  Goal: Effective Oxygenation and Ventilation  Outcome: Met     Problem: Infection  Goal: Infection Symptom Resolution  Outcome: Met  Intervention: Prevent or Manage Infection  Flowsheets (Taken 5/11/2020 1543)  Infection Management: aseptic technique maintained  Isolation Precautions: contact precautions maintained; protective environment maintained

## 2020-05-11 NOTE — PLAN OF CARE
05/10/20 1930   Patient Assessment/Suction   Level of Consciousness (AVPU) alert   Respiratory Effort Unlabored   Expansion/Accessory Muscles/Retractions no use of accessory muscles   All Lung Fields Breath Sounds clear   Rhythm/Pattern, Respiratory unlabored   Cough Frequency no cough   PRE-TX-O2   O2 Device (Oxygen Therapy) room air   SpO2 95 %   Pulse 90   Resp 18   Aerosol Therapy   $ Aerosol Therapy Charges PRN treatment not required   Respiratory Treatment Status (SVN) PRN treatment not required   Respiratory Evaluation   $ Care Plan Tech Time 15 min   Evaluation For   (CARE PLAN)

## 2020-05-11 NOTE — DISCHARGE INSTRUCTIONS
Diet:  Resume regular diet    Physical Activity:  Activity as tolerated    Instructions:  1. Take all medications as prescribed  2. Keep all follow-up appointments  3. Return to the hospital or call your primary care physicians if any worsening symptoms such as worsening back pain, issues in voiding urine occur.      Follow-Up Appointments:  1. Please call your primary care physician to schedule an appointment in 1 week time.  2. Please call your Neurosurgeon to schedule an appointment in 4 week time.     Pending laboratory work/Tests to be performed/followed by the Primary care Physician: none

## 2020-05-11 NOTE — NURSING
Pt. Discharged home via wheelchair to [facility]. Pt. Has all personal belongings, discharge paper work and back brace in place.dc tele and Iv.

## 2020-05-11 NOTE — PLAN OF CARE
05/11/20 1556   Final Note   Assessment Type Final Discharge Note   Anticipated Discharge Disposition Home     SW reviewed d/c orders - no CM needs noted.

## 2020-05-11 NOTE — NURSING
CODE ERIC  called.  Pt unable to be redirected to room.  Pt on isolation precautions.  Pt cussing and yelling in hallway.  Instructed pt to return to room multiple times without success.  Security verbally directed pt into room with pt compliance.  nelia santos per ed rn.  Dr truong at bedside to assess pt's current status.

## 2020-05-25 ENCOUNTER — HOSPITAL ENCOUNTER (INPATIENT)
Facility: HOSPITAL | Age: 55
LOS: 3 days | Discharge: LEFT BEFORE DISCHARGE | DRG: 637 | End: 2020-05-28
Attending: EMERGENCY MEDICINE | Admitting: HOSPITALIST
Payer: MEDICARE

## 2020-05-25 DIAGNOSIS — R78.81 BACTEREMIA: ICD-10-CM

## 2020-05-25 DIAGNOSIS — T40.601A OPIATE OVERDOSE, ACCIDENTAL OR UNINTENTIONAL, INITIAL ENCOUNTER: ICD-10-CM

## 2020-05-25 DIAGNOSIS — R73.9 HYPERGLYCEMIA: ICD-10-CM

## 2020-05-25 DIAGNOSIS — A41.9 SEPSIS, DUE TO UNSPECIFIED ORGANISM, UNSPECIFIED WHETHER ACUTE ORGAN DYSFUNCTION PRESENT: ICD-10-CM

## 2020-05-25 DIAGNOSIS — E11.10 DIABETIC KETOACIDOSIS WITHOUT COMA ASSOCIATED WITH TYPE 2 DIABETES MELLITUS: Primary | ICD-10-CM

## 2020-05-25 PROBLEM — T40.1X1A HEROIN OVERDOSE: Status: ACTIVE | Noted: 2020-05-25

## 2020-05-25 PROBLEM — E83.42 HYPOMAGNESEMIA: Status: ACTIVE | Noted: 2020-05-25

## 2020-05-25 LAB
ALBUMIN SERPL BCP-MCNC: 3.7 G/DL (ref 3.5–5.2)
ALLENS TEST: ABNORMAL
ALP SERPL-CCNC: 108 U/L (ref 55–135)
ALT SERPL W/O P-5'-P-CCNC: 30 U/L (ref 10–44)
AMPHET+METHAMPHET UR QL: NEGATIVE
ANION GAP SERPL CALC-SCNC: 15 MMOL/L (ref 8–16)
AST SERPL-CCNC: 26 U/L (ref 10–40)
B-OH-BUTYR BLD STRIP-SCNC: 0.7 MMOL/L (ref 0–0.5)
BACTERIA #/AREA URNS HPF: NORMAL /HPF
BARBITURATES UR QL SCN>200 NG/ML: NEGATIVE
BASOPHILS # BLD AUTO: 0.01 K/UL (ref 0–0.2)
BASOPHILS NFR BLD: 0.1 % (ref 0–1.9)
BENZODIAZ UR QL SCN>200 NG/ML: NEGATIVE
BILIRUB SERPL-MCNC: 0.3 MG/DL (ref 0.1–1)
BILIRUB UR QL STRIP: NEGATIVE
BNP SERPL-MCNC: 25 PG/ML (ref 0–99)
BUN SERPL-MCNC: 19 MG/DL (ref 6–20)
BZE UR QL SCN: NEGATIVE
CALCIUM SERPL-MCNC: 9.5 MG/DL (ref 8.7–10.5)
CANNABINOIDS UR QL SCN: NEGATIVE
CHLORIDE SERPL-SCNC: 99 MMOL/L (ref 95–110)
CLARITY UR: CLEAR
CO2 SERPL-SCNC: 19 MMOL/L (ref 23–29)
COLOR UR: YELLOW
CREAT SERPL-MCNC: 1.5 MG/DL (ref 0.5–1.4)
CREAT UR-MCNC: 63.3 MG/DL (ref 23–375)
DELSYS: ABNORMAL
DIFFERENTIAL METHOD: ABNORMAL
EOSINOPHIL # BLD AUTO: 0 K/UL (ref 0–0.5)
EOSINOPHIL NFR BLD: 0.1 % (ref 0–8)
ERYTHROCYTE [DISTWIDTH] IN BLOOD BY AUTOMATED COUNT: 15.2 % (ref 11.5–14.5)
EST. GFR  (AFRICAN AMERICAN): >60 ML/MIN/1.73 M^2
EST. GFR  (NON AFRICAN AMERICAN): 52 ML/MIN/1.73 M^2
ETHANOL SERPL-MCNC: <10 MG/DL
FIO2: 100
FLOW: 15
GLUCOSE SERPL-MCNC: 312 MG/DL (ref 70–110)
GLUCOSE SERPL-MCNC: 327 MG/DL (ref 70–110)
GLUCOSE UR QL STRIP: ABNORMAL
HCO3 UR-SCNC: 26.8 MMOL/L (ref 24–28)
HCT VFR BLD AUTO: 36.3 % (ref 40–54)
HGB BLD-MCNC: 11.5 G/DL (ref 14–18)
HGB UR QL STRIP: NEGATIVE
IMM GRANULOCYTES # BLD AUTO: 0.04 K/UL (ref 0–0.04)
IMM GRANULOCYTES NFR BLD AUTO: 0.4 % (ref 0–0.5)
KETONES UR QL STRIP: ABNORMAL
LACTATE SERPL-SCNC: 2.8 MMOL/L (ref 0.5–2.2)
LEUKOCYTE ESTERASE UR QL STRIP: NEGATIVE
LYMPHOCYTES # BLD AUTO: 0.6 K/UL (ref 1–4.8)
LYMPHOCYTES NFR BLD: 6.4 % (ref 18–48)
MAGNESIUM SERPL-MCNC: 1.5 MG/DL (ref 1.6–2.6)
MCH RBC QN AUTO: 27.5 PG (ref 27–31)
MCHC RBC AUTO-ENTMCNC: 31.7 G/DL (ref 32–36)
MCV RBC AUTO: 87 FL (ref 82–98)
METHADONE UR QL SCN>300 NG/ML: NORMAL
MICROSCOPIC COMMENT: NORMAL
MODE: ABNORMAL
MONOCYTES # BLD AUTO: 0.4 K/UL (ref 0.3–1)
MONOCYTES NFR BLD: 3.8 % (ref 4–15)
NEUTROPHILS # BLD AUTO: 8.4 K/UL (ref 1.8–7.7)
NEUTROPHILS NFR BLD: 89.2 % (ref 38–73)
NITRITE UR QL STRIP: NEGATIVE
NRBC BLD-RTO: 0 /100 WBC
OPIATES UR QL SCN: NEGATIVE
PCO2 BLDA: 56.1 MMHG (ref 35–45)
PCP UR QL SCN>25 NG/ML: NEGATIVE
PH SMN: 7.29 [PH] (ref 7.35–7.45)
PH UR STRIP: 5 [PH] (ref 5–8)
PHOSPHATE SERPL-MCNC: 3.7 MG/DL (ref 2.7–4.5)
PLATELET # BLD AUTO: 340 K/UL (ref 150–350)
PMV BLD AUTO: 9.4 FL (ref 9.2–12.9)
PO2 BLDA: 15 MMHG (ref 40–60)
POC BE: -1 MMOL/L
POC SATURATED O2: 16 % (ref 95–100)
POC TCO2: 28 MMOL/L (ref 24–29)
POTASSIUM SERPL-SCNC: 5.2 MMOL/L (ref 3.5–5.1)
PROCALCITONIN SERPL IA-MCNC: 1.22 NG/ML
PROT SERPL-MCNC: 8.8 G/DL (ref 6–8.4)
PROT UR QL STRIP: NEGATIVE
RBC # BLD AUTO: 4.18 M/UL (ref 4.6–6.2)
SAMPLE: ABNORMAL
SARS-COV-2 RDRP RESP QL NAA+PROBE: NEGATIVE
SITE: ABNORMAL
SODIUM SERPL-SCNC: 133 MMOL/L (ref 136–145)
SP GR UR STRIP: 1.01 (ref 1–1.03)
SP02: 100
T4 FREE SERPL-MCNC: 1.02 NG/DL (ref 0.71–1.51)
TOXICOLOGY INFORMATION: NORMAL
TROPONIN I SERPL DL<=0.01 NG/ML-MCNC: 0.02 NG/ML (ref 0–0.03)
TSH SERPL DL<=0.005 MIU/L-ACNC: 0.36 UIU/ML (ref 0.4–4)
URN SPEC COLLECT METH UR: ABNORMAL
UROBILINOGEN UR STRIP-ACNC: NEGATIVE EU/DL
WBC # BLD AUTO: 9.37 K/UL (ref 3.9–12.7)
YEAST URNS QL MICRO: NORMAL

## 2020-05-25 PROCEDURE — 84134 ASSAY OF PREALBUMIN: CPT

## 2020-05-25 PROCEDURE — 81000 URINALYSIS NONAUTO W/SCOPE: CPT | Mod: 59

## 2020-05-25 PROCEDURE — 27100171 HC OXYGEN HIGH FLOW UP TO 24 HOURS

## 2020-05-25 PROCEDURE — 82803 BLOOD GASES ANY COMBINATION: CPT

## 2020-05-25 PROCEDURE — 12000002 HC ACUTE/MED SURGE SEMI-PRIVATE ROOM

## 2020-05-25 PROCEDURE — 99291 CRITICAL CARE FIRST HOUR: CPT | Mod: 25

## 2020-05-25 PROCEDURE — 93005 ELECTROCARDIOGRAM TRACING: CPT

## 2020-05-25 PROCEDURE — 63600175 PHARM REV CODE 636 W HCPCS

## 2020-05-25 PROCEDURE — 99900035 HC TECH TIME PER 15 MIN (STAT)

## 2020-05-25 PROCEDURE — 87040 BLOOD CULTURE FOR BACTERIA: CPT | Mod: 59

## 2020-05-25 PROCEDURE — 96365 THER/PROPH/DIAG IV INF INIT: CPT

## 2020-05-25 PROCEDURE — 93010 EKG 12-LEAD: ICD-10-PCS | Mod: ,,, | Performed by: INTERNAL MEDICINE

## 2020-05-25 PROCEDURE — 96375 TX/PRO/DX INJ NEW DRUG ADDON: CPT

## 2020-05-25 PROCEDURE — 80320 DRUG SCREEN QUANTALCOHOLS: CPT

## 2020-05-25 PROCEDURE — 96361 HYDRATE IV INFUSION ADD-ON: CPT

## 2020-05-25 PROCEDURE — 83880 ASSAY OF NATRIURETIC PEPTIDE: CPT

## 2020-05-25 PROCEDURE — 84100 ASSAY OF PHOSPHORUS: CPT

## 2020-05-25 PROCEDURE — 85025 COMPLETE CBC W/AUTO DIFF WBC: CPT

## 2020-05-25 PROCEDURE — 84439 ASSAY OF FREE THYROXINE: CPT

## 2020-05-25 PROCEDURE — 82010 KETONE BODYS QUAN: CPT

## 2020-05-25 PROCEDURE — 83036 HEMOGLOBIN GLYCOSYLATED A1C: CPT

## 2020-05-25 PROCEDURE — 83605 ASSAY OF LACTIC ACID: CPT | Mod: 91

## 2020-05-25 PROCEDURE — 86703 HIV-1/HIV-2 1 RESULT ANTBDY: CPT

## 2020-05-25 PROCEDURE — 93010 ELECTROCARDIOGRAM REPORT: CPT | Mod: ,,, | Performed by: INTERNAL MEDICINE

## 2020-05-25 PROCEDURE — 80307 DRUG TEST PRSMV CHEM ANLYZR: CPT

## 2020-05-25 PROCEDURE — U0002 COVID-19 LAB TEST NON-CDC: HCPCS

## 2020-05-25 PROCEDURE — 80053 COMPREHEN METABOLIC PANEL: CPT

## 2020-05-25 PROCEDURE — 25000003 PHARM REV CODE 250: Performed by: EMERGENCY MEDICINE

## 2020-05-25 PROCEDURE — 63600175 PHARM REV CODE 636 W HCPCS: Performed by: EMERGENCY MEDICINE

## 2020-05-25 PROCEDURE — 83735 ASSAY OF MAGNESIUM: CPT

## 2020-05-25 PROCEDURE — 84145 PROCALCITONIN (PCT): CPT

## 2020-05-25 PROCEDURE — 84443 ASSAY THYROID STIM HORMONE: CPT

## 2020-05-25 PROCEDURE — 84484 ASSAY OF TROPONIN QUANT: CPT

## 2020-05-25 RX ORDER — ONDANSETRON 2 MG/ML
4 INJECTION INTRAMUSCULAR; INTRAVENOUS
Status: COMPLETED | OUTPATIENT
Start: 2020-05-25 | End: 2020-05-25

## 2020-05-25 RX ORDER — CEFEPIME HYDROCHLORIDE 1 G/50ML
2 INJECTION, SOLUTION INTRAVENOUS
Status: COMPLETED | OUTPATIENT
Start: 2020-05-25 | End: 2020-05-25

## 2020-05-25 RX ORDER — MAGNESIUM SULFATE 1 G/100ML
1 INJECTION INTRAVENOUS
Status: COMPLETED | OUTPATIENT
Start: 2020-05-25 | End: 2020-05-26

## 2020-05-25 RX ORDER — ACETAMINOPHEN 500 MG
1000 TABLET ORAL
Status: DISCONTINUED | OUTPATIENT
Start: 2020-05-25 | End: 2020-05-25

## 2020-05-25 RX ORDER — NALOXONE HCL 0.4 MG/ML
VIAL (ML) INJECTION
Status: COMPLETED
Start: 2020-05-25 | End: 2020-05-25

## 2020-05-25 RX ORDER — NALOXONE HCL 0.4 MG/ML
0.4 VIAL (ML) INJECTION
Status: COMPLETED | OUTPATIENT
Start: 2020-05-25 | End: 2020-05-25

## 2020-05-25 RX ADMIN — ONDANSETRON HYDROCHLORIDE 4 MG: 2 SOLUTION INTRAMUSCULAR; INTRAVENOUS at 08:05

## 2020-05-25 RX ADMIN — CEFEPIME HYDROCHLORIDE 2 G: 2 INJECTION, SOLUTION INTRAVENOUS at 09:05

## 2020-05-25 RX ADMIN — Medication 0.4 MG: at 08:05

## 2020-05-25 RX ADMIN — SODIUM CHLORIDE 1000 ML: 0.9 INJECTION, SOLUTION INTRAVENOUS at 09:05

## 2020-05-25 RX ADMIN — ACETAMINOPHEN 975 MG: 325 SUPPOSITORY RECTAL at 09:05

## 2020-05-25 RX ADMIN — SODIUM CHLORIDE 1000 ML: 0.9 INJECTION, SOLUTION INTRAVENOUS at 08:05

## 2020-05-25 RX ADMIN — VANCOMYCIN HYDROCHLORIDE 1250 MG: 1.25 INJECTION, POWDER, LYOPHILIZED, FOR SOLUTION INTRAVENOUS at 10:05

## 2020-05-25 RX ADMIN — NALOXONE HYDROCHLORIDE 0.4 MG: 0.4 INJECTION, SOLUTION INTRAMUSCULAR; INTRAVENOUS; SUBCUTANEOUS at 08:05

## 2020-05-26 PROBLEM — F17.200 TOBACCO USE DISORDER, MODERATE, DEPENDENCE: Status: ACTIVE | Noted: 2017-11-23

## 2020-05-26 LAB
ANION GAP SERPL CALC-SCNC: 9 MMOL/L (ref 8–16)
ANISOCYTOSIS BLD QL SMEAR: SLIGHT
BASOPHILS # BLD AUTO: 0.01 K/UL (ref 0–0.2)
BASOPHILS NFR BLD: 0.1 % (ref 0–1.9)
BUN SERPL-MCNC: 20 MG/DL (ref 6–20)
CALCIUM SERPL-MCNC: 8.6 MG/DL (ref 8.7–10.5)
CHLORIDE SERPL-SCNC: 106 MMOL/L (ref 95–110)
CO2 SERPL-SCNC: 22 MMOL/L (ref 23–29)
CREAT SERPL-MCNC: 1.3 MG/DL (ref 0.5–1.4)
CRP SERPL-MCNC: 34.68 MG/L (ref 0–3.19)
DIFFERENTIAL METHOD: ABNORMAL
EOSINOPHIL # BLD AUTO: 0 K/UL (ref 0–0.5)
EOSINOPHIL NFR BLD: 0 % (ref 0–8)
ERYTHROCYTE [DISTWIDTH] IN BLOOD BY AUTOMATED COUNT: 15.1 % (ref 11.5–14.5)
EST. GFR  (AFRICAN AMERICAN): >60 ML/MIN/1.73 M^2
EST. GFR  (NON AFRICAN AMERICAN): >60 ML/MIN/1.73 M^2
ESTIMATED AVG GLUCOSE: 151 MG/DL (ref 68–131)
GLUCOSE SERPL-MCNC: 168 MG/DL (ref 70–110)
GLUCOSE SERPL-MCNC: 293 MG/DL (ref 70–110)
HBA1C MFR BLD HPLC: 6.9 % (ref 4–5.6)
HCT VFR BLD AUTO: 30.7 % (ref 40–54)
HGB BLD-MCNC: 9.6 G/DL (ref 14–18)
HIV1+2 IGG SERPL QL IA.RAPID: NORMAL
IMM GRANULOCYTES # BLD AUTO: 0.06 K/UL (ref 0–0.04)
IMM GRANULOCYTES NFR BLD AUTO: 0.4 % (ref 0–0.5)
LACTATE SERPL-SCNC: 1.6 MMOL/L (ref 0.5–2.2)
LYMPHOCYTES # BLD AUTO: 1 K/UL (ref 1–4.8)
LYMPHOCYTES NFR BLD: 7.1 % (ref 18–48)
MAGNESIUM SERPL-MCNC: 2 MG/DL (ref 1.6–2.6)
MCH RBC QN AUTO: 27.4 PG (ref 27–31)
MCHC RBC AUTO-ENTMCNC: 31.3 G/DL (ref 32–36)
MCV RBC AUTO: 88 FL (ref 82–98)
MONOCYTES # BLD AUTO: 1 K/UL (ref 0.3–1)
MONOCYTES NFR BLD: 7.1 % (ref 4–15)
NEUTROPHILS # BLD AUTO: 11.7 K/UL (ref 1.8–7.7)
NEUTROPHILS NFR BLD: 85.3 % (ref 38–73)
NRBC BLD-RTO: 0 /100 WBC
PHOSPHATE SERPL-MCNC: 2.5 MG/DL (ref 2.7–4.5)
PLATELET # BLD AUTO: 284 K/UL (ref 150–350)
PLATELET BLD QL SMEAR: ABNORMAL
PMV BLD AUTO: 9.4 FL (ref 9.2–12.9)
POCT GLUCOSE: 151 MG/DL (ref 70–110)
POCT GLUCOSE: 172 MG/DL (ref 70–110)
POCT GLUCOSE: 192 MG/DL (ref 70–110)
POCT GLUCOSE: 194 MG/DL (ref 70–110)
POCT GLUCOSE: 259 MG/DL (ref 70–110)
POCT GLUCOSE: 306 MG/DL (ref 70–110)
POTASSIUM SERPL-SCNC: 4.5 MMOL/L (ref 3.5–5.1)
PREALB SERPL-MCNC: 17 MG/DL (ref 20–43)
RBC # BLD AUTO: 3.5 M/UL (ref 4.6–6.2)
SARS-COV-2 RNA RESP QL NAA+PROBE: NOT DETECTED
SODIUM SERPL-SCNC: 137 MMOL/L (ref 136–145)
WBC # BLD AUTO: 13.76 K/UL (ref 3.9–12.7)

## 2020-05-26 PROCEDURE — 80048 BASIC METABOLIC PNL TOTAL CA: CPT

## 2020-05-26 PROCEDURE — 85025 COMPLETE CBC W/AUTO DIFF WBC: CPT

## 2020-05-26 PROCEDURE — 63600175 PHARM REV CODE 636 W HCPCS: Performed by: INTERNAL MEDICINE

## 2020-05-26 PROCEDURE — 27000221 HC OXYGEN, UP TO 24 HOURS

## 2020-05-26 PROCEDURE — 63600175 PHARM REV CODE 636 W HCPCS: Performed by: EMERGENCY MEDICINE

## 2020-05-26 PROCEDURE — 83735 ASSAY OF MAGNESIUM: CPT

## 2020-05-26 PROCEDURE — 86141 C-REACTIVE PROTEIN HS: CPT

## 2020-05-26 PROCEDURE — 25000003 PHARM REV CODE 250: Performed by: HOSPITALIST

## 2020-05-26 PROCEDURE — 25000003 PHARM REV CODE 250: Performed by: INTERNAL MEDICINE

## 2020-05-26 PROCEDURE — 63600175 PHARM REV CODE 636 W HCPCS: Performed by: HOSPITALIST

## 2020-05-26 PROCEDURE — 11000001 HC ACUTE MED/SURG PRIVATE ROOM

## 2020-05-26 PROCEDURE — 25000003 PHARM REV CODE 250: Performed by: EMERGENCY MEDICINE

## 2020-05-26 PROCEDURE — U0003 INFECTIOUS AGENT DETECTION BY NUCLEIC ACID (DNA OR RNA); SEVERE ACUTE RESPIRATORY SYNDROME CORONAVIRUS 2 (SARS-COV-2) (CORONAVIRUS DISEASE [COVID-19]), AMPLIFIED PROBE TECHNIQUE, MAKING USE OF HIGH THROUGHPUT TECHNOLOGIES AS DESCRIBED BY CMS-2020-01-R: HCPCS

## 2020-05-26 PROCEDURE — 94761 N-INVAS EAR/PLS OXIMETRY MLT: CPT

## 2020-05-26 PROCEDURE — 99900035 HC TECH TIME PER 15 MIN (STAT)

## 2020-05-26 PROCEDURE — 84100 ASSAY OF PHOSPHORUS: CPT

## 2020-05-26 PROCEDURE — C9399 UNCLASSIFIED DRUGS OR BIOLOG: HCPCS | Performed by: HOSPITALIST

## 2020-05-26 PROCEDURE — 36415 COLL VENOUS BLD VENIPUNCTURE: CPT

## 2020-05-26 RX ORDER — IBUPROFEN 200 MG
16 TABLET ORAL
Status: DISCONTINUED | OUTPATIENT
Start: 2020-05-26 | End: 2020-05-28 | Stop reason: HOSPADM

## 2020-05-26 RX ORDER — ONDANSETRON 2 MG/ML
8 INJECTION INTRAMUSCULAR; INTRAVENOUS EVERY 8 HOURS PRN
Status: DISCONTINUED | OUTPATIENT
Start: 2020-05-26 | End: 2020-05-28 | Stop reason: HOSPADM

## 2020-05-26 RX ORDER — ASPIRIN 325 MG
325 TABLET, DELAYED RELEASE (ENTERIC COATED) ORAL DAILY
Status: DISCONTINUED | OUTPATIENT
Start: 2020-05-26 | End: 2020-05-26

## 2020-05-26 RX ORDER — DEXTROSE MONOHYDRATE AND SODIUM CHLORIDE 5; .9 G/100ML; G/100ML
INJECTION, SOLUTION INTRAVENOUS CONTINUOUS
Status: DISCONTINUED | OUTPATIENT
Start: 2020-05-26 | End: 2020-05-26

## 2020-05-26 RX ORDER — IBUPROFEN 200 MG
24 TABLET ORAL
Status: DISCONTINUED | OUTPATIENT
Start: 2020-05-26 | End: 2020-05-28 | Stop reason: HOSPADM

## 2020-05-26 RX ORDER — HYDRALAZINE HYDROCHLORIDE 20 MG/ML
10 INJECTION INTRAMUSCULAR; INTRAVENOUS EVERY 6 HOURS PRN
Status: DISCONTINUED | OUTPATIENT
Start: 2020-05-26 | End: 2020-05-28 | Stop reason: HOSPADM

## 2020-05-26 RX ORDER — ATORVASTATIN CALCIUM 10 MG/1
20 TABLET, FILM COATED ORAL DAILY
Status: DISCONTINUED | OUTPATIENT
Start: 2020-05-26 | End: 2020-05-28 | Stop reason: HOSPADM

## 2020-05-26 RX ORDER — CEFEPIME HYDROCHLORIDE 1 G/50ML
2 INJECTION, SOLUTION INTRAVENOUS
Status: DISCONTINUED | OUTPATIENT
Start: 2020-05-26 | End: 2020-05-28 | Stop reason: HOSPADM

## 2020-05-26 RX ORDER — SODIUM CHLORIDE 0.9 % (FLUSH) 0.9 %
10 SYRINGE (ML) INJECTION
Status: DISCONTINUED | OUTPATIENT
Start: 2020-05-26 | End: 2020-05-28 | Stop reason: HOSPADM

## 2020-05-26 RX ORDER — GLUCAGON 1 MG
1 KIT INJECTION
Status: DISCONTINUED | OUTPATIENT
Start: 2020-05-26 | End: 2020-05-28 | Stop reason: HOSPADM

## 2020-05-26 RX ORDER — DEXTROSE MONOHYDRATE 100 MG/ML
1000 INJECTION, SOLUTION INTRAVENOUS
Status: DISCONTINUED | OUTPATIENT
Start: 2020-05-26 | End: 2020-05-28 | Stop reason: HOSPADM

## 2020-05-26 RX ORDER — ACETAMINOPHEN 325 MG/1
650 TABLET ORAL EVERY 6 HOURS PRN
Status: DISCONTINUED | OUTPATIENT
Start: 2020-05-26 | End: 2020-05-26

## 2020-05-26 RX ORDER — ONDANSETRON 2 MG/ML
4 INJECTION INTRAMUSCULAR; INTRAVENOUS EVERY 8 HOURS PRN
Status: DISCONTINUED | OUTPATIENT
Start: 2020-05-26 | End: 2020-05-26

## 2020-05-26 RX ORDER — NALOXONE HCL 0.4 MG/ML
0.4 VIAL (ML) INJECTION
Status: DISCONTINUED | OUTPATIENT
Start: 2020-05-26 | End: 2020-05-28 | Stop reason: HOSPADM

## 2020-05-26 RX ORDER — ASPIRIN 81 MG/1
81 TABLET ORAL DAILY
Status: DISCONTINUED | OUTPATIENT
Start: 2020-05-26 | End: 2020-05-28 | Stop reason: HOSPADM

## 2020-05-26 RX ORDER — ACETAMINOPHEN 325 MG/1
650 TABLET ORAL EVERY 6 HOURS PRN
Status: DISCONTINUED | OUTPATIENT
Start: 2020-05-26 | End: 2020-05-28 | Stop reason: HOSPADM

## 2020-05-26 RX ORDER — SODIUM CHLORIDE 9 MG/ML
1000 INJECTION, SOLUTION INTRAVENOUS CONTINUOUS
Status: DISCONTINUED | OUTPATIENT
Start: 2020-05-26 | End: 2020-05-26

## 2020-05-26 RX ORDER — INSULIN ASPART 100 [IU]/ML
1-10 INJECTION, SOLUTION INTRAVENOUS; SUBCUTANEOUS
Status: DISCONTINUED | OUTPATIENT
Start: 2020-05-26 | End: 2020-05-28 | Stop reason: HOSPADM

## 2020-05-26 RX ORDER — GABAPENTIN 300 MG/1
600 CAPSULE ORAL 3 TIMES DAILY
Status: DISCONTINUED | OUTPATIENT
Start: 2020-05-26 | End: 2020-05-28 | Stop reason: HOSPADM

## 2020-05-26 RX ADMIN — INSULIN ASPART 2 UNITS: 100 INJECTION, SOLUTION INTRAVENOUS; SUBCUTANEOUS at 09:05

## 2020-05-26 RX ADMIN — INSULIN ASPART 2 UNITS: 100 INJECTION, SOLUTION INTRAVENOUS; SUBCUTANEOUS at 12:05

## 2020-05-26 RX ADMIN — SODIUM CHLORIDE 7 UNITS/HR: 9 INJECTION, SOLUTION INTRAVENOUS at 12:05

## 2020-05-26 RX ADMIN — MAGNESIUM SULFATE 1 G: 1 INJECTION INTRAVENOUS at 12:05

## 2020-05-26 RX ADMIN — GABAPENTIN 600 MG: 300 CAPSULE ORAL at 08:05

## 2020-05-26 RX ADMIN — GUAIFENESIN AND DEXTROMETHORPHAN HYDROBROMIDE 1 TABLET: 600; 30 TABLET, EXTENDED RELEASE ORAL at 08:05

## 2020-05-26 RX ADMIN — INSULIN DETEMIR 5 UNITS: 100 INJECTION, SOLUTION SUBCUTANEOUS at 09:05

## 2020-05-26 RX ADMIN — INSULIN ASPART 1 UNITS: 100 INJECTION, SOLUTION INTRAVENOUS; SUBCUTANEOUS at 09:05

## 2020-05-26 RX ADMIN — GABAPENTIN 600 MG: 300 CAPSULE ORAL at 03:05

## 2020-05-26 RX ADMIN — DEXTROSE AND SODIUM CHLORIDE: 5; .9 INJECTION, SOLUTION INTRAVENOUS at 02:05

## 2020-05-26 RX ADMIN — GABAPENTIN 600 MG: 300 CAPSULE ORAL at 09:05

## 2020-05-26 RX ADMIN — CEFEPIME HYDROCHLORIDE 2 G: 2 INJECTION, SOLUTION INTRAVENOUS at 03:05

## 2020-05-26 RX ADMIN — SODIUM CHLORIDE 1000 ML: 0.9 INJECTION, SOLUTION INTRAVENOUS at 01:05

## 2020-05-26 RX ADMIN — ASPIRIN 81 MG: 81 TABLET ORAL at 09:05

## 2020-05-26 RX ADMIN — INSULIN ASPART 6 UNITS: 100 INJECTION, SOLUTION INTRAVENOUS; SUBCUTANEOUS at 05:05

## 2020-05-26 RX ADMIN — ACETAMINOPHEN 650 MG: 325 TABLET ORAL at 08:05

## 2020-05-26 RX ADMIN — ATORVASTATIN CALCIUM 20 MG: 10 TABLET, FILM COATED ORAL at 09:05

## 2020-05-26 RX ADMIN — MAGNESIUM SULFATE 1 G: 1 INJECTION INTRAVENOUS at 01:05

## 2020-05-26 RX ADMIN — GUAIFENESIN AND DEXTROMETHORPHAN HYDROBROMIDE 1 TABLET: 600; 30 TABLET, EXTENDED RELEASE ORAL at 03:05

## 2020-05-26 RX ADMIN — VANCOMYCIN HYDROCHLORIDE 1750 MG: 500 INJECTION, POWDER, LYOPHILIZED, FOR SOLUTION INTRAVENOUS at 09:05

## 2020-05-26 NOTE — PROVIDER TRANSFER
ICU transfer note    Mr Mabry presented with acute encephalopathy and DKA. He was given narcan for suspected unintentional opiate OD given he history of drug use. Also started on abx for possible sepsis given fever of 104F and tachycardia. Suspect aspiration vs bacteremia vs both. Fever did not recur and DKA/encephalopathy resolved. Requires small amount of insulin. Is eating. Added sputum cultures to workup and resumed abx (not continued on admission). Home with abx for possible aspiration if BCx negative for at least 72 hours.

## 2020-05-26 NOTE — PROGRESS NOTES
[facility] Hospital Medicine  Progress Note    Patient Name: Cali Mabry  MRN: [id number]  Patient Class: IP- Inpatient   Admission Date: 5/25/2020  Length of Stay: 1 days  Attending Physician: Edie Alegria MD  Primary Care Provider: Cullen Zimmer MD        Subjective:     Principal Problem:Sepsis        HPI:    Cali Mabry is a 54 y.o. male that (in part)  has a past medical history of Acute on chronic pancreatitis, DKA (diabetic ketoacidoses), DM (diabetes mellitus), type 1, HCV (hepatitis C virus), HTN (hypertension), IVDU (intravenous drug user), and Pancreatitis.  has a past surgical history that includes Shoulder surgery (2013); Cardiac surgery (1999); and Esophagogastroduodenoscopy (N/A, 3/5/2020). Presents to [facility] Emergency Department with report report the patient had altered mental status per family beginning yesterday and progressively worsening.  EMS was activated.  At a capillary blood glucose of 294 on the scene.  Episode of nonbloody nonbilious vomiting yesterday.  Patient complained of a headache.  Known history of polysubstance abuse including cocaine, marijuana, heroin, and tobacco.  Patient became more obtunded while in transport.  History was limited upon arrival.      In the emergency department the patient was morbid tended and received Narcan with some improvement in his symptoms.  Routine laboratory studies, urinalysis, toxicology screen, and chest x-ray was performed.  Concern for opioid overdose in addition to diabetic ketoacidosis.  He was also febrile to 104°.  Cultures were obtained and broad-spectrum antibiotics were initiated.  Concern for aspiration pneumonia versus bacteremia secondary to IV drug use.  DKA protocol initiated including bolus of IV fluids and insulin.      Hospital medicine has been asked to admit to inpatient for further evaluation and treatment.     Overview/Hospital Course:  Mr Mabry presented  with acute encephalopathy. Treated for potential unintentional opiate overdose, severe sepsis and diabetic ketoacidosis. Woke up some after narcan given but reportedly not back to baseline. Admitted to ICU for insulin infusion and management of diabetic ketoacidosis. This resolved quickly and transitioned to SQ insulin. Encephalopathy resolved. Patient admitted to taking pain pills but getting of the street and not knowing what type of opiate. Toxicology screen positive for methadone, which he did not report getting a prescription for. As for sepsis, he was febrile to 104F on presentation, which resolved quickly. Urinalysis did not suggest infection. Blood cultures obtained. Has productive cough but no clear consolidation and no wheezing. On broad spectrum antibiotics. Stepped down to floor on 5/26    Interval History: complaining of cough and chest pain with cough. Afebrile. AAO x 3. DKA resolved overnight.     Review of Systems   Constitutional: Negative.    Respiratory: Positive for cough. Negative for shortness of breath.    Cardiovascular: Positive for chest pain.   Gastrointestinal: Negative.      Objective:     Vital Signs (Most Recent):  Temp: 98.9 °F (37.2 °C) (05/26/20 1325)  Pulse: 104 (05/26/20 1325)  Resp: 18 (05/26/20 1325)  BP: (!) 126/58 (05/26/20 1325)  SpO2: 98 % (05/26/20 1325) Vital Signs (24h Range):  Temp:  [98.4 °F (36.9 °C)-104.2 °F (40.1 °C)] 98.9 °F (37.2 °C)  Pulse:  [] 104  Resp:  [6-31] 18  SpO2:  [86 %-100 %] 98 %  BP: ()/(50-75) 126/58     Weight: 66.3 kg (146 lb 2.6 oz)  Body mass index is 22.22 kg/m².    Intake/Output Summary (Last 24 hours) at 5/26/2020 1401  Last data filed at 5/26/2020 1200  Gross per 24 hour   Intake 3189.02 ml   Output 1100 ml   Net 2089.02 ml      Physical Exam   Constitutional: He is oriented to person, place, and time. He appears well-developed. No distress.   Appears older than stated age   Cardiovascular: Normal rate and regular rhythm.  "  Pulmonary/Chest: Effort normal and breath sounds normal. He has no wheezes. He has no rales. He exhibits no tenderness.   Coughing up yellow sputum   Abdominal: Soft.   Musculoskeletal: He exhibits no edema.   Neurological: He is alert and oriented to person, place, and time.   Skin: Capillary refill takes less than 2 seconds. He is not diaphoretic.   Psychiatric: He has a normal mood and affect. His behavior is normal. Judgment and thought content normal.   Nursing note and vitals reviewed.      Significant Labs: All pertinent labs within the past 24 hours have been reviewed.    Significant Imaging: I have reviewed all pertinent imaging results/findings within the past 24 hours.  I have reviewed and interpreted all pertinent imaging results/findings within the past 24 hours.      Assessment/Plan:      * Sepsis  Met criteria with fever + tachycardia + suspected bacteremia (denied recent IV drug use) vs aspiration pneumonia not yet apparent on chest radiography. Antibiotics ordered in the ED were not continued on admission. I will resume vancomycin and cefepime pending culture results. Will add sputum culture. Repeat COVID test pending as he is still high probability given cough, high CRP and leukopenia. Continue COVID precautions for now. Vitals q 4 hours. Encouraged PO intake.       Hypomagnesemia  Replaced. Now WNL      Polysubstance abuse  Spent > 5 minutes discussing cessation. Does not seem motivated to quit      Opioid abuse  Spent > 5 minutes discussing cessation. Does not seem motivated to quit      Marijuana abuse  Spent > 5 minutes discussing cessation. Does not seem motivated to quit      Cocaine abuse  Spent > 5 minutes discussing cessation. Does not seem motivated to quit      Tobacco use disorder, moderate, dependence  Spent > 5 minutes discussing cessation  Is not motivated to quit as he states he only smokes "a little"  Not interested in nicotine patch      Chronic relapsing pancreatitis  No acute " issues  Use non narcotic analgesics      Chronic hepatitis C  No acute issues  F/u with Hepatology as outpatient      Intravenous drug abuse  Denied recent IV drug use  Spent > 5 minutes discussing cessation. Does not seem motivated to quit      Diabetic ketoacidosis with coma associated with type 1 diabetes mellitus  Resolved. Transitioned to SQ insulin and thus far stable  POC BG AC/HS  Diabetic diet      Type 1 diabetes mellitus with hyperglycemia  Hgb A1c 6.9%, which is not bad  DKA resolved. Now on SQ insulin  Spent > 5 minutes discussing importance of compliance with insulin. Verbalized understanding      VTE Risk Mitigation (From admission, onward)         Ordered     IP VTE LOW RISK PATIENT  Once      05/26/20 0056     Place DAWIT hose  Until discontinued      05/26/20 0056     Place sequential compression device  Until discontinued      05/26/20 0056              Step down to floor today      Time spent: > 35 minutes in patient's care    Edie Kelly MD  Department of Hospital Medicine   [facility]

## 2020-05-26 NOTE — NURSING TRANSFER
Nursing Transfer Note      5/26/2020     Transfer to  [address]    Transfer via bed    Transfer with oxygen    Transported by transporter    Medicines sent: yes    Chart send with patient: yes    Notified: pt to notify family    Patient reassessed at: 05/26/2020  1200pm

## 2020-05-26 NOTE — ED TRIAGE NOTES
"Pt presents to ED via EMS with c/o emesis and altered mental status. Pt with hx of DKA as well as heroin abuse. Pt moaning and tachypneic on 15 L O2 nonrebreather upon ED arrival and unable to answer any questions, only moaning. Pinpoint pupils noted, IV access established and 0.4 mg IV narcan administered with positive response. Pt able to provide name but responds with "I don't know" to other questions; denies heroin use today, reports last heroin use was yesterday. Pt remains tachypneic and tachycardic on nonrebreather, hooked up to cardiac monitor and pulse ox.   "

## 2020-05-26 NOTE — CARE UPDATE
[facility]  ICU Shift Summary  Date: 5/26/2020      COVID Test: (--)  Isolation: Airborne and Contact and Droplet     Prehospitalization: Home  Admit Date / LOS : 5/25/2020/ 1 days    Diagnosis: Sepsis    Consults:        Active: N/A       Needed: N/A     Code Status: Full Code   Advanced Directive: <no information>    LDA: PIV       Central Lines/Site/Justification:Patient Does Not Have Central Line       Urinary Cath/Order/Justification:Patient Does Not Have Urinary Catheter    Vasopressors/Infusions:    dextrose 5 % and 0.9 % NaCl 125 mL/hr at 05/26/20 0600          GOALS: Volume/ Hemodynamic: N/A                     RASS: N/A    Pain Management: none       Pain Controlled: not applicable     Rhythm: NSR    Respiratory Device: Nasal Cannula                      VTE Prophylaxis: Mechanical  Mobility: Bedrest  Stress Ulcer Prophylaxis: No    Dietary: PO      I & O (24h):    Intake/Output Summary (Last 24 hours) at 5/26/2020 0642  Last data filed at 5/26/2020 0600  Gross per 24 hour   Intake 2889.02 ml   Output --   Net 2889.02 ml        Restraints: No    Significant Dates:  Post Op Date: N/A  Rescue Date: N/A  Imaging/ Diagnostics: N/A    Noteworthy Labs:  none    CBC/Anemia Labs: Coags:    Recent Labs   Lab 05/25/20 2016 05/26/20  0142   WBC 9.37 13.76*   HGB 11.5* 9.6*   HCT 36.3* 30.7*    284   MCV 87 88   RDW 15.2* 15.1*    No results for input(s): PT, INR, APTT in the last 168 hours.     Chemistries:   Recent Labs   Lab 05/25/20 2017 05/26/20  0142   * 137   K 5.2* 4.5   CL 99 106   CO2 19* 22*   BUN 19 20   CREATININE 1.5* 1.3   CALCIUM 9.5 8.6*   PROT 8.8*  --    BILITOT 0.3  --    ALKPHOS 108  --    ALT 30  --    AST 26  --    MG 1.5* 2.0   PHOS 3.7 2.5*        Cardiac Enzymes: Ejection Fractions:    Recent Labs     05/25/20 2017   TROPONINI 0.016    Nuc Rest EF   Date Value Ref Range Status   03/15/2019 53  Final        POCT Glucose: HbA1c:    Recent Labs   Lab  05/26/20  0055 05/26/20  0201   POCTGLUCOSE 306* 151*    Hemoglobin A1C   Date Value Ref Range Status   02/22/2020 11.0 (H) 4.0 - 5.6 % Final     Comment:     ADA Screening Guidelines:  5.7-6.4%  Consistent with prediabetes  >or=6.5%  Consistent with diabetes  High levels of fetal hemoglobin interfere with the HbA1C  assay. Heterozygous hemoglobin variants (HbS, HgC, etc)do  not significantly interfere with this assay.   However, presence of multiple variants may affect accuracy.             ICU LOS 5h  Level of Care: OK to Transfer    Shift Summary/Plan for the shift:  Alert, oriented.  Uncooperative at times.  Vitals stable.  Insulin drip turned off and transitioned to subcu.  Diabetic diet ordered.  Repeat covid test ordered and sample obtained, isolation precautions placed until repeat test resulted.

## 2020-05-26 NOTE — ED PROVIDER NOTES
Encounter Date: 5/25/2020    SCRIBE #1 NOTE: I, Cary Borges, am scribing for, and in the presence of,  Kin Pagan MD. I have scribed the following portions of the note - Other sections scribed: HPI, ROS, PE.       History     Chief Complaint   Patient presents with    Emesis     since yesterday, hx of DM,     Altered Mental Status     pt moaning upon ED arrival,  family reports possible hx of + covid and COPD, they report pt also c/o headache today      CC: Emesis; AMS    Patient is a 54 y.o male with a PMHx of DM, DKA, HTN, Hepatitis C, and Pancreatitis who presents to the ED, per EMS, an AMS. Per EMS, patient complained of emesis for 2 days. CBG was 294 mg/dL. EMS states he was talking normally en route. Patient has a Hx of IVDA. Patient admits to Heroin use yesterday, but denies use today. History is limited as patient is minimally responsive.     The history is provided by the patient. The history is limited by the condition of the patient.     Review of patient's allergies indicates:  No Known Allergies  Past Medical History:   Diagnosis Date    Acute on chronic pancreatitis 4/3/2016    DKA (diabetic ketoacidoses) 02/2020    DM (diabetes mellitus), type 1     HCV (hepatitis C virus)     high VL     HTN (hypertension)     IVDU (intravenous drug user)     Heroin    Pancreatitis      Past Surgical History:   Procedure Laterality Date    CARDIAC SURGERY  1999    stent placed. ([facility])    ESOPHAGOGASTRODUODENOSCOPY N/A 3/5/2020    Gastritis.  No H pylori.  Completed PPI for 1 month.  Procedure: EGD (ESOPHAGOGASTRODUODENOSCOPY);  Surgeon: Brinda Rene MD;  Location: [facility];  Service: Endoscopy;  Laterality: N/A;  [id number] A; [phone]    SHOULDER SURGERY  2013    right shoulder, spur removal.     Family History   Problem Relation Age of Onset    Asthma Mother      Social History     Tobacco Use    Smoking status: Former Smoker     Packs/day: 1.00     Years: 20.00     Pack years:  20.00     Types: Cigarettes     Start date: 4/3/1981     Last attempt to quit: 10/2019     Years since quittin.6    Smokeless tobacco: Never Used   Substance Use Topics    Alcohol use: No     Alcohol/week: 0.0 standard drinks    Drug use: Yes     Types: IV, Marijuana, Heroin     Comment: currently, used today, herion     Review of Systems   Unable to perform ROS: Mental status change   Gastrointestinal: Positive for vomiting.       Physical Exam     Initial Vitals   BP Pulse Resp Temp SpO2   20   (!) 158/75 (!) 144 (!) 22 98.5 °F (36.9 °C) 95 %      MAP       --                Physical Exam    Nursing note and vitals reviewed.  Constitutional: He appears well-developed and well-nourished. He is not diaphoretic. No distress.   HENT:   Head: Normocephalic and atraumatic.   Right Ear: External ear normal.   Left Ear: External ear normal.   Mouth/Throat: Oropharynx is clear and moist.   Eyes: Right eye exhibits no discharge. Left eye exhibits no discharge. No scleral icterus.   Pin point pupils.    Neck: No tracheal deviation present. No JVD present.   Cardiovascular: Regular rhythm, normal heart sounds and intact distal pulses. Tachycardia present.  Exam reveals no gallop and no friction rub.    No murmur heard.  Pulmonary/Chest: No stridor. No respiratory distress. He has no wheezes. He has no rales.   Agonally breathing.    Abdominal: Soft. He exhibits no distension. There is no tenderness. There is no guarding.   Musculoskeletal: Normal range of motion. He exhibits no edema or tenderness.   Neurological: He is alert.   CARMEN with NGND's. Arouses to painful stimuli.   Skin: Skin is warm and dry. Capillary refill takes less than 2 seconds. No rash noted. No erythema.         ED Course   Critical Care  Date/Time: 2020 11:06 PM  Performed by: Kin Pagan MD  Authorized by: Kin Pagan MD   Direct patient critical care time: 30  minutes  Additional history critical care time: 10 minutes  Ordering / reviewing critical care time: 10 minutes  Documentation critical care time: 5 minutes  Consulting other physicians critical care time: 10 minutes  Consult with family critical care time: 0 minutes  Other critical care time: 0 minutes  Total critical care time (exclusive of procedural time) : 65 minutes  Critical care time was exclusive of separately billable procedures and treating other patients.  Critical care was necessary to treat or prevent imminent or life-threatening deterioration of the following conditions: toxidrome, metabolic crisis, dehydration and sepsis.  Critical care was time spent personally by me on the following activities: evaluation of patient's response to treatment, pulse oximetry, ordering and performing treatments and interventions, development of treatment plan with patient or surrogate, examination of patient, ordering and review of laboratory studies, re-evaluation of patient's condition, discussions with consultants, interpretation of cardiac output measurements, obtaining history from patient or surrogate, ordering and review of radiographic studies and review of old charts.        Labs Reviewed   CBC W/ AUTO DIFFERENTIAL - Abnormal; Notable for the following components:       Result Value    RBC 4.18 (*)     Hemoglobin 11.5 (*)     Hematocrit 36.3 (*)     Mean Corpuscular Hemoglobin Conc 31.7 (*)     RDW 15.2 (*)     Gran # (ANC) 8.4 (*)     Lymph # 0.6 (*)     Gran% 89.2 (*)     Lymph% 6.4 (*)     Mono% 3.8 (*)     All other components within normal limits   COMPREHENSIVE METABOLIC PANEL - Abnormal; Notable for the following components:    Sodium 133 (*)     Potassium 5.2 (*)     CO2 19 (*)     Glucose 327 (*)     Creatinine 1.5 (*)     Total Protein 8.8 (*)     eGFR if non  52 (*)     All other components within normal limits   BETA - HYDROXYBUTYRATE, SERUM - Abnormal; Notable for the following  components:    Beta-Hydroxybutyrate 0.7 (*)     All other components within normal limits   URINALYSIS, REFLEX TO URINE CULTURE - Abnormal; Notable for the following components:    Glucose, UA 3+ (*)     Ketones, UA Trace (*)     All other components within normal limits    Narrative:     Preferred Collection Type->Urine, Clean Catch   MAGNESIUM - Abnormal; Notable for the following components:    Magnesium 1.5 (*)     All other components within normal limits   LACTIC ACID, PLASMA - Abnormal; Notable for the following components:    Lactate (Lactic Acid) 2.8 (*)     All other components within normal limits   TSH - Abnormal; Notable for the following components:    TSH 0.362 (*)     All other components within normal limits   PROCALCITONIN - Abnormal; Notable for the following components:    Procalcitonin 1.22 (*)     All other components within normal limits   PREALBUMIN - Abnormal; Notable for the following components:    Prealbumin 17 (*)     All other components within normal limits   ISTAT PROCEDURE - Abnormal; Notable for the following components:    POC PH 7.286 (*)     POC PCO2 56.1 (*)     POC PO2 15 (*)     POC SATURATED O2 16 (*)     All other components within normal limits   CULTURE, BLOOD   CULTURE, BLOOD   CULTURE, BLOOD   PHOSPHORUS   ALCOHOL,MEDICAL (ETHANOL)   DRUG SCREEN PANEL, URINE EMERGENCY    Narrative:     Preferred Collection Type->Urine, Clean Catch   TROPONIN I   B-TYPE NATRIURETIC PEPTIDE   SARS-COV-2 RNA AMPLIFICATION, QUAL   TSH   T4, FREE   T4, FREE   PROCALCITONIN   URINALYSIS MICROSCOPIC    Narrative:     Preferred Collection Type->Urine, Clean Catch   RAPID HIV   HEMOGLOBIN A1C   POCT GLUCOSE MONITORING CONTINUOUS     EKG Readings: (Independently Interpreted)   Initial Reading: No STEMI. Rhythm: Sinus Tachycardia. Heart Rate: 158. Ectopy: No Ectopy. ST Segments: Normal ST Segments. Axis: Normal. Clinical Impression: Sinus Tachycardia       Imaging Results          X-Ray Chest AP  Portable (Final result)  Result time 05/25/20 22:35:58    Final result by Linh Gordon MD (05/25/20 22:35:58)                 Impression:      Chronic lung changes and scarring with no acute cardiopulmonary process identified.  No significant change.      Electronically signed by: Linh Gordon MD  Date:    05/25/2020  Time:    22:35             Narrative:    EXAMINATION:  XR CHEST AP PORTABLE    CLINICAL HISTORY:  hyperglycemia;    TECHNIQUE:  Single frontal view of the chest was performed.    COMPARISON:  05/09/2020.    FINDINGS:  Cardiac silhouette is normal in size.  Lungs are symmetrically expanded.  Chronic coarse interstitial lung changes and scarring are seen, more pronounced within the right lower lung zone, similar to recent radiograph and CT.  No evidence of new focal consolidative process.  No pneumothorax or significant pleural effusion.  No acute osseous abnormality identified.                                 Medical Decision Making:   History:   Old Medical Records: I decided to obtain old medical records.  Independently Interpreted Test(s):   I have ordered and independently interpreted EKG Reading(s) - see prior notes  Clinical Tests:   Lab Tests: Ordered and Reviewed  Radiological Study: Ordered and Reviewed  Medical Tests: Ordered and Reviewed  ED Management:  2218: Reassessed patient. Patient is more alert at this time. He reports of cough for 2-3 days. He is unsure of onset of fever.     2258: Discussed patient with Dr. Wynn, hospitalist.            Scribe Attestation:   Scribe #1: I performed the above scribed service and the documentation accurately describes the services I performed. I attest to the accuracy of the note.    Pt arrived somnolent and febrile with HR of 150 increased RR but normotensive.  Pt immediately became alert and GCS of 15 with administration of Narcan with resolution of increased WOB.  He never required subsequent doses of Narcan and suffered no recurrence of  respiratory compromise while on the ED floor.  Rectal temp was 104 F and given recent hospitalizations he is high risk for HCA infections so broad spectrum ABX given.  My suspicion is this is likely aspiration pneumonia given no other clear source of infection. IVF's given and insulin drip initiated due to AG of 15 and acidosis raising concern for DKA.  IVF's given as tachycardia is likely secondary to dehydration.  Pt discussed with Dr. Wynn of hospital medicine and admitted to the ICU for further evaluation and management.    Kin Pagan MD                                  Clinical Impression:       ICD-10-CM ICD-9-CM   1. Diabetic ketoacidosis without coma associated with type 2 diabetes mellitus E11.10 250.12   2. Hyperglycemia R73.9 790.29   3. Sepsis, due to unspecified organism, unspecified whether acute organ dysfunction present A41.9 038.9     995.91   4. Bacteremia R78.81 790.7   5. Opiate overdose, accidental or unintentional, initial encounter T40.601A 965.00     E850.2             ED Disposition Condition    Admit               I, Kin Pagan, personally performed the services described in this documentation. All medical record entries made by the scribe were at my direction and in my presence.  I have reviewed the chart and agree that the record reflects my personal performance and is accurate and complete.               Kin Pagan MD  05/26/20 0316

## 2020-05-26 NOTE — SUBJECTIVE & OBJECTIVE
Interval History: complaining of cough and chest pain with cough. Afebrile. AAO x 3. DKA resolved overnight.     Review of Systems   Constitutional: Negative.    Respiratory: Positive for cough. Negative for shortness of breath.    Cardiovascular: Positive for chest pain.   Gastrointestinal: Negative.      Objective:     Vital Signs (Most Recent):  Temp: 98.9 °F (37.2 °C) (05/26/20 1325)  Pulse: 104 (05/26/20 1325)  Resp: 18 (05/26/20 1325)  BP: (!) 126/58 (05/26/20 1325)  SpO2: 98 % (05/26/20 1325) Vital Signs (24h Range):  Temp:  [98.4 °F (36.9 °C)-104.2 °F (40.1 °C)] 98.9 °F (37.2 °C)  Pulse:  [] 104  Resp:  [6-31] 18  SpO2:  [86 %-100 %] 98 %  BP: ()/(50-75) 126/58     Weight: 66.3 kg (146 lb 2.6 oz)  Body mass index is 22.22 kg/m².    Intake/Output Summary (Last 24 hours) at 5/26/2020 1401  Last data filed at 5/26/2020 1200  Gross per 24 hour   Intake 3189.02 ml   Output 1100 ml   Net 2089.02 ml      Physical Exam   Constitutional: He is oriented to person, place, and time. He appears well-developed. No distress.   Appears older than stated age   Cardiovascular: Normal rate and regular rhythm.   Pulmonary/Chest: Effort normal and breath sounds normal. He has no wheezes. He has no rales. He exhibits no tenderness.   Coughing up yellow sputum   Abdominal: Soft.   Musculoskeletal: He exhibits no edema.   Neurological: He is alert and oriented to person, place, and time.   Skin: Capillary refill takes less than 2 seconds. He is not diaphoretic.   Psychiatric: He has a normal mood and affect. His behavior is normal. Judgment and thought content normal.   Nursing note and vitals reviewed.      Significant Labs: All pertinent labs within the past 24 hours have been reviewed.    Significant Imaging: I have reviewed all pertinent imaging results/findings within the past 24 hours.  I have reviewed and interpreted all pertinent imaging results/findings within the past 24 hours.

## 2020-05-26 NOTE — HPI
Cali Mabry is a 54 y.o. male that (in part)  has a past medical history of Acute on chronic pancreatitis, DKA (diabetic ketoacidoses), DM (diabetes mellitus), type 1, HCV (hepatitis C virus), HTN (hypertension), IVDU (intravenous drug user), and Pancreatitis.  has a past surgical history that includes Shoulder surgery (2013); Cardiac surgery (1999); and Esophagogastroduodenoscopy (N/A, 3/5/2020). Presents to [facility] Emergency Department with report report the patient had altered mental status per family beginning yesterday and progressively worsening.  EMS was activated.  At a capillary blood glucose of 294 on the scene.  Episode of nonbloody nonbilious vomiting yesterday.  Patient complained of a headache.  Known history of polysubstance abuse including cocaine, marijuana, heroin, and tobacco.  Patient became more obtunded while in transport.  History was limited upon arrival.      In the emergency department the patient was morbid tended and received Narcan with some improvement in his symptoms.  Routine laboratory studies, urinalysis, toxicology screen, and chest x-ray was performed.  Concern for opioid overdose in addition to diabetic ketoacidosis.  He was also febrile to 104°.  Cultures were obtained and broad-spectrum antibiotics were initiated.  Concern for aspiration pneumonia versus bacteremia secondary to IV drug use.  DKA protocol initiated including bolus of IV fluids and insulin.      Hospital medicine has been asked to admit to inpatient for further evaluation and treatment.

## 2020-05-26 NOTE — HOSPITAL COURSE
Mr Mabry presented with acute encephalopathy. Treated for potential unintentional opiate overdose, severe sepsis and diabetic ketoacidosis. Woke up some after narcan given but reportedly not back to baseline. Admitted to ICU for insulin infusion and management of diabetic ketoacidosis. This resolved quickly and transitioned to SQ insulin. Encephalopathy resolved. Patient admitted to taking pain pills but getting of the street and not knowing what type of opiate. Toxicology screen positive for methadone, which he did not report getting a prescription for. As for sepsis, he was febrile to 104F on presentation, which resolved quickly. Urinalysis did not suggest infection. Blood cultures obtained. Has productive cough but no clear consolidation and no wheezing. On broad spectrum antibiotics. Stepped down to floor on 5/26 5/27/2020  Complained of being weak generalized.

## 2020-05-26 NOTE — NURSING
Report received from ICU nurse Marnie. Pt arrived to 417B via hospital bed, surgical mask in place, VSS, 2LNC O2 in place. Cardiac monitoring refused by pt, MD notified.

## 2020-05-26 NOTE — PROGRESS NOTES
Pharmacokinetic Initial Assessment: IV Vancomycin    Assessment/Plan:    Initiate intravenous vancomycin with loading dose of 1250 mg once followed by a maintenance dose of vancomycin 1750 mg IV every 24 hours  Desired empiric serum trough concentration is 10 to 20 mcg/mL  Draw vancomycin trough level 30 min prior to third dose on 5/27/2020 at approximately 2130   Pharmacy will continue to follow and monitor vancomycin.      Please contact pharmacy at extension [phone] with any questions regarding this assessment.     Thank you for the consult,   Betzy Phillip       Patient brief summary:  Cali Mabry is a 54 y.o. male initiated on antimicrobial therapy with IV Vancomycin for treatment of suspected bacteremia    Drug Allergies:   Review of patient's allergies indicates:  No Known Allergies    Actual Body Weight:   66.3 kg     Renal Function:   Estimated Creatinine Clearance: 61.6 mL/min (based on SCr of 1.3 mg/dL).,     Dialysis Method (if applicable):  N/A    CBC (last 72 hours):  Recent Labs   Lab Result Units 05/25/20 2016 05/25/20 2051 05/26/20  0142   WBC K/uL 9.37  --  13.76*   Hemoglobin g/dL 11.5*  --  9.6*   Hemoglobin A1C %  --  6.9*  --    Hematocrit % 36.3*  --  30.7*   Platelets K/uL 340  --  284   Gran% % 89.2*  --  85.3*   Lymph% % 6.4*  --  7.1*   Mono% % 3.8*  --  7.1   Eosinophil% % 0.1  --  0.0   Basophil% % 0.1  --  0.1   Differential Method  Automated  --  Automated       Metabolic Panel (last 72 hours):  Recent Labs   Lab Result Units 05/25/20 2017 05/25/20 2137 05/26/20  0142   Sodium mmol/L 133*  --  137   Potassium mmol/L 5.2*  --  4.5   Chloride mmol/L 99  --  106   CO2 mmol/L 19*  --  22*   Glucose mg/dL 327*  --  168*   Glucose, UA   --  3+*  --    BUN, Bld mg/dL 19  --  20   Creatinine mg/dL 1.5*  --  1.3   Creatinine, Random Ur mg/dL  --  63.3  --    Albumin g/dL 3.7  --   --    Total Bilirubin mg/dL 0.3  --   --    Alkaline Phosphatase U/L 108  --   --    AST U/L 26  --   --     ALT U/L 30  --   --    Magnesium mg/dL 1.5*  --  2.0   Phosphorus mg/dL 3.7  --  2.5*       Drug levels (last 3 results):  No results for input(s): VANCOMYCINRA, VANCOMYCINPE, VANCOMYCINTR in the last 72 hours.    Microbiologic Results:  Microbiology Results (last 7 days)       Procedure Component Value Units Date/Time    Culture, Respiratory with Gram Stain [[id number]]     Order Status:  No result Specimen:  Respiratory     Blood culture [[id number]] Collected:  05/25/20 2320    Order Status:  Completed Specimen:  Blood from Peripheral, Hand, Left Updated:  05/26/20 0712     Blood Culture, Routine No Growth to date    Blood culture #1 **CANNOT BE ORDERED STAT** [[id number]] Collected:  05/25/20 2051    Order Status:  Completed Specimen:  Blood from Peripheral, Hand, Right Updated:  05/26/20 0512     Blood Culture, Routine No Growth to date    Blood culture #2 **CANNOT BE ORDERED STAT** [[id number]] Collected:  05/25/20 2055    Order Status:  Completed Specimen:  Blood from Peripheral, Forearm, Right Updated:  05/26/20 0512     Blood Culture, Routine No Growth to date    Blood culture [[id number]]     Order Status:  Canceled Specimen:  Blood

## 2020-05-26 NOTE — H&P
[facility] Hospital Medicine  History & Physical    Patient Name: Cali Mabry  MRN: [id number]  Admission Date: 05/25/2020  Attending Physician: Hans Wynn MD, MPH      PCP:     Cullen Zimmer MD    CC:     Chief Complaint   Patient presents with    Emesis     since yesterday, hx of DM,     Altered Mental Status     pt moaning upon ED arrival,  family reports possible hx of + covid and COPD, they report pt also c/o headache today        HISTORY OF PRESENT ILLNESS:     Cali Mabry is a 54 y.o. male that (in part)  has a past medical history of Acute on chronic pancreatitis, DKA (diabetic ketoacidoses), DM (diabetes mellitus), type 1, HCV (hepatitis C virus), HTN (hypertension), IVDU (intravenous drug user), and Pancreatitis.  has a past surgical history that includes Shoulder surgery (2013); Cardiac surgery (1999); and Esophagogastroduodenoscopy (N/A, 3/5/2020). Presents to [facility] Emergency Department with report report the patient had altered mental status per family beginning yesterday and progressively worsening.  EMS was activated.  At a capillary blood glucose of 294 on the scene.  Episode of nonbloody nonbilious vomiting yesterday.  Patient complained of a headache.  Known history of polysubstance abuse including cocaine, marijuana, heroin, and tobacco.  Patient became more obtunded while in transport.  History was limited upon arrival.      In the emergency department the patient was morbid tended and received Narcan with some improvement in his symptoms.  Routine laboratory studies, urinalysis, toxicology screen, and chest x-ray was performed.  Concern for opioid overdose in addition to diabetic ketoacidosis.  He was also febrile to 104°.  Cultures were obtained and broad-spectrum antibiotics were initiated.  Concern for aspiration pneumonia versus bacteremia secondary to IV drug use.  DKA protocol initiated including bolus of IV fluids  and insulin.      Hospital medicine has been asked to admit to inpatient for further evaluation and treatment.       REVIEW OF SYSTEMS:     Unable to obtain reliable review of systems due to current condition of the patient with opioid overdose and probable metabolic encephalopathy.      PAST MEDICAL / SURGICAL HISTORY:     Past Medical History:   Diagnosis Date    Acute on chronic pancreatitis 4/3/2016    DKA (diabetic ketoacidoses) 2020    DM (diabetes mellitus), type 1     HCV (hepatitis C virus)     high VL     HTN (hypertension)     IVDU (intravenous drug user)     Heroin    Pancreatitis      Past Surgical History:   Procedure Laterality Date    CARDIAC SURGERY      stent placed. [facility])    ESOPHAGOGASTRODUODENOSCOPY N/A 3/5/2020    Gastritis.  No H pylori.  Completed PPI for 1 month.  Procedure: EGD (ESOPHAGOGASTRODUODENOSCOPY);  Surgeon: Brinda Rene MD;  Location: [facility];  Service: Endoscopy;  Laterality: N/A;  [id number]; [phone]    SHOULDER SURGERY      right shoulder, spur removal.         FAMILY HISTORY:     Family History   Problem Relation Age of Onset    Asthma Mother          SOCIAL HISTORY:     Social History     Socioeconomic History    Marital status: Single     Spouse name: Not on file    Number of children: Not on file    Years of education: Not on file    Highest education level: Not on file   Occupational History    Not on file   Social Needs    Financial resource strain: Not on file    Food insecurity:     Worry: Not on file     Inability: Not on file    Transportation needs:     Medical: Not on file     Non-medical: Not on file   Tobacco Use    Smoking status: Former Smoker     Packs/day: 1.00     Years: 20.00     Pack years: 20.00     Types: Cigarettes     Start date: 4/3/1981     Last attempt to quit: 10/2019     Years since quittin.6    Smokeless tobacco: Never Used   Substance and Sexual Activity    Alcohol use: No     Alcohol/week: 0.0  standard drinks    Drug use: Yes     Types: IV, Marijuana, Heroin     Comment: currently, used today, herion    Sexual activity: Yes     Partners: Female   Lifestyle    Physical activity:     Days per week: Not on file     Minutes per session: Not on file    Stress: Not on file   Relationships    Social connections:     Talks on phone: Not on file     Gets together: Not on file     Attends Mandaen service: Not on file     Active member of club or organization: Not on file     Attends meetings of clubs or organizations: Not on file     Relationship status: Not on file   Other Topics Concern    Not on file   Social History Narrative    Not on file         ALLERGIES:       Review of patient's allergies indicates:  No Known Allergies        HOME MEDICATIONS:     Prior to Admission medications    Medication Sig Start Date End Date Taking? Authorizing Provider   acetaminophen (TYLENOL) 650 MG TbSR Take 650 mg by mouth every 8 (eight) hours as needed.    Historical Provider, MD   albuterol-ipratropium (DUO-NEB) 2.5 mg-0.5 mg/3 mL nebulizer solution Take 3 mLs by nebulization every 4 (four) hours as needed for Wheezing. Rescue    Historical Provider, MD   ALPRAZolam (XANAX) 0.5 MG tablet Take 0.5 mg by mouth 2 (two) times daily as needed for Anxiety.    Historical Provider, MD   ASCORBIC ACID, VITAMIN C, ORAL Take 1 tablet by mouth once daily.    Historical Provider, MD   aspirin (ECOTRIN) 325 MG EC tablet Take 1 tablet (325 mg total) by mouth once daily. 5/12/20 5/12/21  John Bonilla MD   atorvastatin (LIPITOR) 20 MG tablet Take 20 mg by mouth once daily.    Historical Provider, MD   b complex vitamins tablet Take 1 tablet by mouth once daily.    Historical Provider, MD   baclofen (LIORESAL) 10 MG tablet Take 10 mg by mouth 3 (three) times daily.    Historical Provider, MD   bisacodyL (DULCOLAX) 10 mg Supp Place 10 mg rectally daily as needed.    Historical Provider, MD   blood sugar diagnostic Strp Use To  check blood glucose three times daily, 4/2/20   Matthew Blackwell MD   blood-glucose meter Misc To check Blood glucose three times daily, 4/2/20   Matthew Blackwell MD   calcium carbonate (TUMS) 200 mg calcium (500 mg) chewable tablet Take 1 tablet (500 mg total) by mouth daily as needed for Heartburn. 4/22/20 4/22/21  Faviola Rocha MD   diphenhydrAMINE (BENADRYL) 25 mg capsule Take 25 mg by mouth every 6 (six) hours as needed for Itching.    Historical Provider, MD   divalproex (DEPAKOTE) 125 MG EC tablet Take 125 mg by mouth every 8 (eight) hours.    Historical Provider, MD   docusate sodium (COLACE) 100 MG capsule Take 1 capsule (100 mg total) by mouth 2 (two) times daily as needed for Constipation. 4/22/20   Faviola Rocha MD   DULoxetine (CYMBALTA) 60 MG capsule Take 1 capsule (60 mg total) by mouth once daily. 4/23/20 4/23/21  Faviola Rocha MD   folic acid (FOLVITE) 800 MCG Tab Take 800 mcg by mouth once daily.    Historical Provider, MD   folic acid-vit B6-vit B12 2.5-25-2 mg (FOLBIC OR EQUIV) 2.5-25-2 mg Tab Take 1 tablet by mouth once daily. 4/23/20   Faviola Rocha MD   gabapentin (NEURONTIN) 300 MG capsule Take 2 capsules (600 mg total) by mouth 3 (three) times daily. 5/11/20 5/11/21  John Bonilla MD   glucagon, human recombinant, (GLUCAGEN HYPOKIT) 1 mg SolR Inject 1 mg into the muscle as needed.    Historical Provider, MD   glucose 4 GM chewable tablet Take 16 g by mouth as needed for Low blood sugar.    Historical Provider, MD   HALOPERIDOL LACTATE INJ Inject 1 mg as directed every 4 (four) hours as needed.    Historical Provider, MD   hydroxyzine HCL (ATARAX) 25 MG tablet Take 1 tablet (25 mg total) by mouth 3 (three) times daily as needed for Anxiety. 4/22/20   Faviola Rocha MD   insulin aspart U-100 (NOVOLOG) 100 unit/mL injection Inject 1-10 Units into the skin 3 (three) times daily with meals.    Historical Provider, MD   insulin detemir U-100 (LEVEMIR) 100 unit/mL injection Inject 25  Units into the skin 2 (two) times a day.    Ugo Provider, MD   insulin lispro 100 unit/mL injection Inject 10 Units into the skin 3 (three) times daily before meals. 4/22/20 4/22/21  Faviola Rocha MD   L. acidophilus/Strept/La p-whitney (RISAQUAD ORAL) Take 1 capsule by mouth once daily.    Ugo Lares MD   lancets Misc To check BG 3 times daily, to use with insurance preferred meter 4/2/20   Matthew Blackwell MD   LANTUS U-100 INSULIN 100 unit/mL injection Inject 22 Units into the skin 2 (two) times daily. 4/22/20 4/22/21  Faviola Rocha MD   lidocaine (LIDODERM) 5 % Place 1 patch onto the skin once daily. Remove & Discard patch within 12 hours or as directed by MD.  Apply to shoulder for pain 4/22/20   Faviola Rocha MD   lidocaine (LIDODERM) 5 % Place 1 patch onto the skin daily as needed. Remove & Discard patch within 12 hours or as directed by MD.  Apply to back for pain 4/22/20   Faviola Rocha MD   melatonin (MELATIN) 3 mg tablet Take 2 tablets (6 mg total) by mouth nightly as needed for Insomnia. 4/22/20   Faviola Rocha MD   mineral oil (FLEET OIL RETENTION) enema Place 1 enema rectally daily as needed for Constipation.    Historical Provider, MD   morphine sulfate (MORPHINE INJ) Inject 1 mg as directed every 8 (eight) hours as needed.    Historical Provider, MD   multivitamin (THERAGRAN) per tablet Take 1 tablet by mouth once daily. 4/22/20   Faviola Rocha MD   ondansetron (ZOFRAN) 2 mg/mL injection Inject 4 mg into the vein every 6 (six) hours as needed.    Ugo Provider, MD   ondansetron (ZOFRAN-ODT) 8 MG TbDL Take 1 tablet (8 mg total) by mouth every 8 (eight) hours as needed. 4/22/20   Faviola Rocha MD   oxyCODONE (ROXICODONE) 10 mg Tab immediate release tablet Take 10 mg by mouth every 6 (six) hours as needed for Pain.    Historical Provider, MD   oxyCODONE-acetaminophen (PERCOCET) 5-325 mg per tablet Take 1 tablet by mouth every 4 (four) hours as needed for Pain. 3/8/20   Tyrese  ABDI Dickinson MD   polyethylene glycol (GLYCOLAX) 17 gram PwPk Take 17 g by mouth daily as needed (Constipation).  Patient taking differently: Take 17 g by mouth 3 (three) times daily as needed (Constipation).  4/22/20   Faviola Rocha MD   QUEtiapine (SEROQUEL) 50 MG tablet Take 50 mg by mouth every evening.    Historical Provider, MD   traZODone (DESYREL) 50 MG tablet Take 25 mg by mouth every evening.    Historical Provider, MD          HOSPITAL MEDICATIONS:     Scheduled Meds:    magnesium sulfate IVPB  1 g Intravenous Q2H    vancomycin (VANCOCIN) IVPB  20 mg/kg Intravenous ED 1 Time     Continuous Infusions:   PRN Meds:       PHYSICAL EXAM:     Wt Readings from Last 1 Encounters:   05/25/20 2003 68 kg (150 lb)     Body mass index is 22.81 kg/m².  Vitals:    05/25/20 2102 05/25/20 2108 05/25/20 2118 05/25/20 2146   BP: 129/63  114/63 (!) 112/54   BP Location:       Patient Position:       Pulse: (!) 127  (!) 123 (!) 120   Resp: (!) 31  (!) 31 (!) 21   Temp:  (!) 104.2 °F (40.1 °C)     TempSrc:       SpO2: (!) 86%  100% 100%   Weight:       Height:              -- General appearance:  Chronically ill-appearing middle-aged male who is lying in bed.  Somnolent.  No apparent distress.  well developed. appears stated age   -- Head: normocephalic, atraumatic   -- Eyes: conjunctivae clear. Extraocular muscles intact  -- Nose: Nares normal. Septum midline.   -- Mouth/Throat: lips, mucosa, and tongue normal. no throat erythema.   -- Neck: supple, symmetrical, trachea midline, no JVD and thyroid not grossly enlarged, appears symmetric  -- Lungs: clear to auscultation bilaterally.  Increased respiratory rate with normal respiratory effort. No use of accessory muscles.   -- Chest wall: no tenderness. equal bilateral chest rise   -- Heart: regular rate and regular rhythm. S1, S2 normal.  no click, rub or gallop   -- Abdomen: soft, non-tender, non-distended, non-tympanic; bowel sounds normal; no masses  -- Extremities: no  cyanosis, clubbing or edema.   -- Pulses: 2+ and symmetric   -- Skin:  Turgor normal. Color normal. Texture normal.  Track marks from IV drug use  -- Neurologic:  Somnolent/encephalopathic.  Normal strength and tone. No focal numbness or weakness. CNII-XII intact. Cheri coma scale:  11      LABORATORY STUDIES:     Recent Results (from the past 36 hour(s))   CBC auto differential    Collection Time: 05/25/20  8:16 PM   Result Value Ref Range    WBC 9.37 3.90 - 12.70 K/uL    RBC 4.18 (L) 4.60 - 6.20 M/uL    Hemoglobin 11.5 (L) 14.0 - 18.0 g/dL    Hematocrit 36.3 (L) 40.0 - 54.0 %    Mean Corpuscular Volume 87 82 - 98 fL    Mean Corpuscular Hemoglobin 27.5 27.0 - 31.0 pg    Mean Corpuscular Hemoglobin Conc 31.7 (L) 32.0 - 36.0 g/dL    RDW 15.2 (H) 11.5 - 14.5 %    Platelets 340 150 - 350 K/uL    MPV 9.4 9.2 - 12.9 fL    Immature Granulocytes 0.4 0.0 - 0.5 %    Gran # (ANC) 8.4 (H) 1.8 - 7.7 K/uL    Immature Grans (Abs) 0.04 0.00 - 0.04 K/uL    Lymph # 0.6 (L) 1.0 - 4.8 K/uL    Mono # 0.4 0.3 - 1.0 K/uL    Eos # 0.0 0.0 - 0.5 K/uL    Baso # 0.01 0.00 - 0.20 K/uL    nRBC 0 0 /100 WBC    Gran% 89.2 (H) 38.0 - 73.0 %    Lymph% 6.4 (L) 18.0 - 48.0 %    Mono% 3.8 (L) 4.0 - 15.0 %    Eosinophil% 0.1 0.0 - 8.0 %    Basophil% 0.1 0.0 - 1.9 %    Differential Method Automated    Beta - Hydroxybutyrate, Serum    Collection Time: 05/25/20  8:16 PM   Result Value Ref Range    Beta-Hydroxybutyrate 0.7 (H) 0.0 - 0.5 mmol/L   Ethanol    Collection Time: 05/25/20  8:16 PM   Result Value Ref Range    Alcohol, Medical, Serum <10 <10 mg/dL   POCT Glucose, Hand-Held Device    Collection Time: 05/25/20  8:16 PM   Result Value Ref Range    POC Glucose 312 (A) 70 - 110 MG/DL   Procalcitonin    Collection Time: 05/25/20  8:16 PM   Result Value Ref Range    Procalcitonin 1.22 (H) <0.25 ng/mL   Comprehensive metabolic panel    Collection Time: 05/25/20  8:17 PM   Result Value Ref Range    Sodium 133 (L) 136 - 145 mmol/L    Potassium 5.2 (H) 3.5  - 5.1 mmol/L    Chloride 99 95 - 110 mmol/L    CO2 19 (L) 23 - 29 mmol/L    Glucose 327 (H) 70 - 110 mg/dL    BUN, Bld 19 6 - 20 mg/dL    Creatinine 1.5 (H) 0.5 - 1.4 mg/dL    Calcium 9.5 8.7 - 10.5 mg/dL    Total Protein 8.8 (H) 6.0 - 8.4 g/dL    Albumin 3.7 3.5 - 5.2 g/dL    Total Bilirubin 0.3 0.1 - 1.0 mg/dL    Alkaline Phosphatase 108 55 - 135 U/L    AST 26 10 - 40 U/L    ALT 30 10 - 44 U/L    Anion Gap 15 8 - 16 mmol/L    eGFR if African American >60 >60 mL/min/1.73 m^2    eGFR if non African American 52 (A) >60 mL/min/1.73 m^2   Magnesium    Collection Time: 05/25/20  8:17 PM   Result Value Ref Range    Magnesium 1.5 (L) 1.6 - 2.6 mg/dL   Phosphorus    Collection Time: 05/25/20  8:17 PM   Result Value Ref Range    Phosphorus 3.7 2.7 - 4.5 mg/dL   Troponin I    Collection Time: 05/25/20  8:17 PM   Result Value Ref Range    Troponin I 0.016 0.000 - 0.026 ng/mL   Brain natriuretic peptide    Collection Time: 05/25/20  8:17 PM   Result Value Ref Range    BNP 25 0 - 99 pg/mL   TSH    Collection Time: 05/25/20  8:17 PM   Result Value Ref Range    TSH 0.362 (L) 0.400 - 4.000 uIU/mL   T4, free    Collection Time: 05/25/20  8:17 PM   Result Value Ref Range    Free T4 1.02 0.71 - 1.51 ng/dL   Lactic acid, plasma    Collection Time: 05/25/20  8:51 PM   Result Value Ref Range    Lactate (Lactic Acid) 2.8 (H) 0.5 - 2.2 mmol/L   ISTAT PROCEDURE    Collection Time: 05/25/20  9:15 PM   Result Value Ref Range    POC PH 7.286 (L) 7.35 - 7.45    POC PCO2 56.1 (H) 35 - 45 mmHg    POC PO2 15 (LL) 40 - 60 mmHg    POC HCO3 26.8 24 - 28 mmol/L    POC BE -1 -2 to 2 mmol/L    POC SATURATED O2 16 (L) 95 - 100 %    POC TCO2 28 24 - 29 mmol/L    Sample VENOUS     Site Other     Allens Test N/A     DelSys NRB     Mode SPONT     Flow 15     FiO2 100     Sp02 100    Urinalysis, Reflex to Urine Culture Urine, Clean Catch    Collection Time: 05/25/20  9:37 PM   Result Value Ref Range    Specimen UA Urine, Catheterized     Color, UA Yellow  Yellow, Straw, Monet    Appearance, UA Clear Clear    pH, UA 5.0 5.0 - 8.0    Specific Gravity, UA 1.010 1.005 - 1.030    Protein, UA Negative Negative    Glucose, UA 3+ (A) Negative    Ketones, UA Trace (A) Negative    Bilirubin (UA) Negative Negative    Occult Blood UA Negative Negative    Nitrite, UA Negative Negative    Urobilinogen, UA Negative <2.0 EU/dL    Leukocytes, UA Negative Negative   Drug screen panel, emergency    Collection Time: 05/25/20  9:37 PM   Result Value Ref Range    Benzodiazepines Negative     Methadone metabolites Presumptive Positive     Cocaine (Metab.) Negative     Opiate Scrn, Ur Negative     Barbiturate Screen, Ur Negative     Amphetamine Screen, Ur Negative     THC Negative     Phencyclidine Negative     Creatinine, Random Ur 63.3 23.0 - 375.0 mg/dL    Toxicology Information SEE COMMENT    Urinalysis Microscopic    Collection Time: 05/25/20  9:37 PM   Result Value Ref Range    Bacteria None None-Occ /hpf    Yeast, UA None None    Microscopic Comment SEE COMMENT    COVID-19 Rapid Screening    Collection Time: 05/25/20  9:38 PM   Result Value Ref Range    SARS-CoV-2 RNA, Amplification, Qual Negative Negative       Lab Results   Component Value Date    INR 1.0 05/09/2020    INR 1.1 04/13/2019    INR 1.0 08/04/2018     Lab Results   Component Value Date    HGBA1C 11.0 (H) 02/22/2020     No results for input(s): POCTGLUCOSE in the last 72 hours.        MICROBIOLOGY DATA:     Urine Culture, Routine   Date Value Ref Range Status   08/04/2018 No growth  Final   11/22/2017 No growth  Final     AFB Culture & Smear   Date Value Ref Range Status   03/30/2020 Culture in progress  Preliminary   03/30/2020 Culture in progress  Preliminary   03/30/2020 Culture in progress  Preliminary   03/30/2020 Culture in progress  Preliminary   03/29/2020 Culture in progress  Preliminary   03/29/2020 Culture in progress  Preliminary       Microbiology x 7d:   Microbiology Results (last 7 days)     Procedure  Component Value Units Date/Time    Blood culture [[id number]]     Order Status:  No result Specimen:  Blood     Blood culture #1 **CANNOT BE ORDERED STAT** [[id number]] Collected:  05/25/20 2051    Order Status:  Sent Specimen:  Blood from Peripheral, Hand, Right Updated:  05/25/20 2109    Blood culture #2 **CANNOT BE ORDERED STAT** [[id number]] Collected:  05/25/20 2055    Order Status:  Sent Specimen:  Blood from Peripheral, Forearm, Right Updated:  05/25/20 2108    Blood culture [[id number]]     Order Status:  Canceled Specimen:  Blood             IMAGING:     Imaging Results          X-Ray Chest AP Portable (Final result)  Result time 05/25/20 22:35:58    Final result by Linh Gordon MD (05/25/20 22:35:58)                 Impression:      Chronic lung changes and scarring with no acute cardiopulmonary process identified.  No significant change.      Electronically signed by: Linh Gordon MD  Date:    05/25/2020  Time:    22:35             Narrative:    EXAMINATION:  XR CHEST AP PORTABLE    CLINICAL HISTORY:  hyperglycemia;    TECHNIQUE:  Single frontal view of the chest was performed.    COMPARISON:  05/09/2020.    FINDINGS:  Cardiac silhouette is normal in size.  Lungs are symmetrically expanded.  Chronic coarse interstitial lung changes and scarring are seen, more pronounced within the right lower lung zone, similar to recent radiograph and CT.  No evidence of new focal consolidative process.  No pneumothorax or significant pleural effusion.  No acute osseous abnormality identified.                                  ASSESSMENT & PLAN:     Primary Diagnosis:  Sepsis    Active Hospital Problems    Diagnosis  POA    *Sepsis [A41.9]  Yes     Priority: 1 - High    Heroin overdose [T40.1X1A]  Yes     Priority: 2     Opioid abuse [F11.10]  Yes     Priority: 2      Chronic    Diabetic ketoacidosis [E11.10]  Yes     Priority: 2     Cocaine abuse [F14.10]  Yes     Priority: 3     Marijuana abuse [F12.10]  Yes      Priority: 3     Intravenous drug abuse [F19.10]  Yes     Priority: 3      Chronic    Chronic hepatitis C [B18.2]  Yes     Priority: 4      Chronic    Tobacco abuse [Z72.0]  Yes     Priority: 5      Chronic    Hypomagnesemia [E83.42]  Yes    Polysubstance abuse [F19.10]  Yes    Chronic relapsing pancreatitis [K86.1]  Yes     Chronic    Type 1 diabetes mellitus with hyperglycemia [E10.65]  Yes      Resolved Hospital Problems   No resolved problems to display.       Diabetic ketoacidosis  As evidence by history, physical exam, and laboratory studies.  No obvious underlying infectious or ischemic etiology identified. Patient reports strict compliance with diet and medication regimen.      Diagnostic criteria:  · Blood glucose greater than 250:  Yes  · Anion gap greater than 10:  Yes  · Bicarbonate less than 18:  NO (19)  · Ketones in urine or elevated beta hydroxybutyrate:  Yes  · pH less than 7.3: Yes    4 Components of DKA Management    Fluids  · Assess volume status  · Give 0.9% saline @ 1 L/h initially, then continue if hypovolemic  · Switch to 0.45% normal saline at 240-500 mL/h if corrected serum sodium level becomes normal or high  · When plasma glucose level reaches 200 mg/dL, then switch to 5% dextrose with 0.45% normal saline at 150-250 ml/hr.    Insulin  · Give regular insulin 0.1 unit/kg as IV bolus followed by 0.1 unit/kg/hr as IV infusion.  · If the plasma glucose level does not decrease by 10% in the 1st hour, give an additional bolus of 0.14 unit/kg and then resume previous infusion rate of 0.1 unit/kg/hr.  · When the plasma glucose level reaches 100mg/dL, reduce insulin rate to to 0.02 units/kg/hr and maintain the plasma glucose level between 150-200mg/dL until anion gap acidosis is resolved.    Potassium  · Assess for adequate kidney function and urine output (~50mL/h)  · If serum K < 3.3 meq/L, do not start insulin but instead give IV potassium chloride 20-30 meq/hr through central line to  reach K>3.3.  · Then add 20-30meq per each liter of fluid given to keep potassium between 4.0 to 5.0 meq/L  · If potassium is >5.2 znae/L, then give insulin and fluids only while checking potassium every 2 hours    Correction of acidosis  · If pH is < 6.9, give sodium bicarbonate, 100mmol in 400mL of water infused over 2 hours.  · If pH is >6.9, then do not give sodium bicarbonate    ===========================================================     Sepsis with probable bacteremia  Based upon history, laboratory studies, and physical exam.  High risk for bacteremia secondary to IV drug use.  Obtaining 2D echocardiogram evaluate for endocarditis/bacterial vegetations.  No evidence of pneumonia on chest x-ray, but history of cavitary pneumonia.  Blood cultures pending.  Empiric antibiotics initiated with vancomycin and cephalosporin.    Opioid overdose with history of Polysubstance abuse   · Presented in [facility] state and responded to Narcan in the ED.  · Chronic tobacco use  · Chronic illicit drug use  · Case management consult to provide community resources for substance abuse  · Nicotine patch offered; considered Wellbutrin or Chantix through PCP as an outpatient (will require closer monitoring)  · Tobacco cessation counseling: I discussed with the patient regarding the hazardous effects of smoking on increasing risk of heart attack and stroke, worsening lung functions, and increasing cancer risk.   Patient was urged to stop smoking now.  I also offered nicotine taper (such as nicotine patch and gum) to help ease the craving to smoke.    Hypomagnesemia  · Due to GI losses or poor oral intake  · Replace magnesium IV  · Check serial magnesium                VTE Risk Mitigation (From admission, onward)    None            Adult PRN medications available   DVT prophylaxis given       DISPOSITION:     Will admit to the Hospital Medicine service for further evaluation and treatment.    Chart reviewed and updated where  applicable.    High Risk Conditions:  Patient has a condition that poses threat to life and bodily function:  Diabetic ketoacidosis.  Bacteremia.  Sepsis.  Drug overdose.      ===============================================================    Hans Wynn MD, MPH  Department of Hospital Medicine   [facility]  [phone]  (7pm - 6am)          This note is dictated using [employer] voice recognition software.  There are word recognition mistakes that are occasionally missed on review.

## 2020-05-26 NOTE — PLAN OF CARE
05/26/20 1602   Discharge Assessment   Assessment Type Discharge Planning Assessment   Assessment information obtained from? Medical Record   Prior to hospitilization cognitive status: Alert/Oriented   Prior to hospitalization functional status: Independent   Current cognitive status: Alert/Oriented   Current Functional Status: Independent   Facility Arrived From: home   Lives With alone   Able to Return to Prior Arrangements yes   Is patient able to care for self after discharge? Yes   Patient's perception of discharge disposition home or selfcare   Patient currently being followed by outpatient case management? No   Patient currently receives any other outside agency services? No   Equipment Currently Used at Home cane, straight   Do you have any problems affording any of your prescribed medications? No   Is the patient taking medications as prescribed? yes   Does the patient have transportation home? Yes   Transportation Anticipated family or friend will provide   Does the patient receive services at the [facility]? No   Discharge Plan A Home   Discharge Plan B Home   DME Needed Upon Discharge  none   Patient/Family in Agreement with Plan yes         [id number] - [place] - [address]  [address] [place] [address]  Phone: [phone] Fax: [phone]    [facility] - [place] - [address]  [address] [place] [address]  Phone: [phone] Fax: [phone]

## 2020-05-26 NOTE — NURSING
Pt state his belongings (shirt, pants, wallet) missing. Called pt's mother, confirmed pt's shirt pants and wallet with pt's mother.

## 2020-05-26 NOTE — ASSESSMENT & PLAN NOTE
"Spent > 5 minutes discussing cessation  Is not motivated to quit as he states he only smokes "a little"  Not interested in nicotine patch    "

## 2020-05-26 NOTE — ASSESSMENT & PLAN NOTE
Hgb A1c 6.9%, which is not bad  DKA resolved. Now on SQ insulin  Spent > 5 minutes discussing importance of compliance with insulin. Verbalized understanding

## 2020-05-26 NOTE — ASSESSMENT & PLAN NOTE
Met criteria with fever + tachycardia + suspected bacteremia (denied recent IV drug use) vs aspiration pneumonia not yet apparent on chest radiography. Antibiotics ordered in the ED were not continued on admission. I will resume vancomycin and cefepime pending culture results. Will add sputum culture. Repeat COVID test pending as he is still high probability given cough, high CRP and leukopenia. Continue COVID precautions for now. Vitals q 4 hours. Encouraged PO intake.

## 2020-05-26 NOTE — PLAN OF CARE
Pt AAOx4, VSS, maintains SPO2 95% on 2L NC, pt refused cardiac monitor, voids per urinal, denies pain, remains free of fall.    Problem: Fall Injury Risk  Goal: Absence of Fall and Fall-Related Injury  Outcome: Ongoing, Progressing  Intervention: Identify and Manage Contributors to Fall Injury Risk  Flowsheets (Taken 5/26/2020 1437)  Self-Care Promotion: independence encouraged; BADL personal objects within reach  Medication Review/Management: medications reviewed; high risk medications identified  Intervention: Promote Injury-Free Environment  Flowsheets (Taken 5/26/2020 1437)  Safety Promotion/Fall Prevention: bed alarm set; assistive device/personal item within reach; instructed to call staff for mobility; side rails raised x 3; nonskid shoes/socks when out of bed; medications reviewed; Fall Risk reviewed with patient/family; commode/urinal/bedpan at bedside  Environmental Safety Modification: assistive device/personal items within reach; clutter free environment maintained; lighting adjusted     Problem: Diabetes Comorbidity  Goal: Blood Glucose Level Within Desired Range  Outcome: Ongoing, Progressing  Intervention: Maintain Glycemic Control  Flowsheets (Taken 5/26/2020 1437)  Glycemic Management: blood glucose monitoring     Problem: Glycemic Control Impaired (Sepsis/Septic Shock)  Goal: Blood Glucose Level Within Desired Range  Outcome: Ongoing, Progressing  Intervention: Optimize Glycemic Control  Flowsheets (Taken 5/26/2020 1437)  Glycemic Management: blood glucose monitoring     Problem: Infection (Sepsis/Septic Shock)  Goal: Absence of Infection Signs/Symptoms  Outcome: Ongoing, Progressing  Intervention: Prevent or Manage Infection Progression  Flowsheets (Taken 5/26/2020 1437)  Infection Prevention: personal protective equipment utilized  Isolation Precautions: contact precautions maintained; droplet precautions maintained; airborne precautions maintained     Problem: Infection (Pneumonia)  Goal:  Resolution of Infection Signs/Symptoms  Outcome: Ongoing, Progressing  Intervention: Prevent Infection Progression  Flowsheets (Taken 5/26/2020 5450)  Isolation Precautions: contact precautions maintained; droplet precautions maintained; airborne precautions maintained

## 2020-05-26 NOTE — ASSESSMENT & PLAN NOTE
Denied recent IV drug use  Spent > 5 minutes discussing cessation. Does not seem motivated to quit

## 2020-05-27 LAB
BASOPHILS # BLD AUTO: 0.01 K/UL (ref 0–0.2)
BASOPHILS NFR BLD: 0.1 % (ref 0–1.9)
DIFFERENTIAL METHOD: ABNORMAL
EOSINOPHIL # BLD AUTO: 0 K/UL (ref 0–0.5)
EOSINOPHIL NFR BLD: 0.3 % (ref 0–8)
ERYTHROCYTE [DISTWIDTH] IN BLOOD BY AUTOMATED COUNT: 15.1 % (ref 11.5–14.5)
HCT VFR BLD AUTO: 31.9 % (ref 40–54)
HGB BLD-MCNC: 10.1 G/DL (ref 14–18)
IMM GRANULOCYTES # BLD AUTO: 0.05 K/UL (ref 0–0.04)
IMM GRANULOCYTES NFR BLD AUTO: 0.6 % (ref 0–0.5)
LYMPHOCYTES # BLD AUTO: 1 K/UL (ref 1–4.8)
LYMPHOCYTES NFR BLD: 10.8 % (ref 18–48)
MCH RBC QN AUTO: 27.4 PG (ref 27–31)
MCHC RBC AUTO-ENTMCNC: 31.7 G/DL (ref 32–36)
MCV RBC AUTO: 86 FL (ref 82–98)
MONOCYTES # BLD AUTO: 0.6 K/UL (ref 0.3–1)
MONOCYTES NFR BLD: 6.1 % (ref 4–15)
NEUTROPHILS # BLD AUTO: 7.4 K/UL (ref 1.8–7.7)
NEUTROPHILS NFR BLD: 82.1 % (ref 38–73)
NRBC BLD-RTO: 0 /100 WBC
PLATELET # BLD AUTO: 243 K/UL (ref 150–350)
PMV BLD AUTO: 9.5 FL (ref 9.2–12.9)
POCT GLUCOSE: 387 MG/DL (ref 70–110)
POCT GLUCOSE: 392 MG/DL (ref 70–110)
POCT GLUCOSE: 412 MG/DL (ref 70–110)
POCT GLUCOSE: 487 MG/DL (ref 70–110)
RBC # BLD AUTO: 3.69 M/UL (ref 4.6–6.2)
VANCOMYCIN TROUGH SERPL-MCNC: 13.5 UG/ML (ref 10–22)
WBC # BLD AUTO: 8.98 K/UL (ref 3.9–12.7)

## 2020-05-27 PROCEDURE — 87077 CULTURE AEROBIC IDENTIFY: CPT

## 2020-05-27 PROCEDURE — 63600175 PHARM REV CODE 636 W HCPCS: Performed by: INTERNAL MEDICINE

## 2020-05-27 PROCEDURE — 94761 N-INVAS EAR/PLS OXIMETRY MLT: CPT

## 2020-05-27 PROCEDURE — 87070 CULTURE OTHR SPECIMN AEROBIC: CPT

## 2020-05-27 PROCEDURE — 36415 COLL VENOUS BLD VENIPUNCTURE: CPT

## 2020-05-27 PROCEDURE — 80202 ASSAY OF VANCOMYCIN: CPT

## 2020-05-27 PROCEDURE — 25000003 PHARM REV CODE 250: Performed by: INTERNAL MEDICINE

## 2020-05-27 PROCEDURE — 27000221 HC OXYGEN, UP TO 24 HOURS

## 2020-05-27 PROCEDURE — 11000001 HC ACUTE MED/SURG PRIVATE ROOM

## 2020-05-27 PROCEDURE — C9399 UNCLASSIFIED DRUGS OR BIOLOG: HCPCS | Performed by: INTERNAL MEDICINE

## 2020-05-27 PROCEDURE — 87186 SC STD MICRODIL/AGAR DIL: CPT

## 2020-05-27 PROCEDURE — 85025 COMPLETE CBC W/AUTO DIFF WBC: CPT

## 2020-05-27 PROCEDURE — 87205 SMEAR GRAM STAIN: CPT

## 2020-05-27 RX ORDER — GUAIFENESIN/DEXTROMETHORPHAN 100-10MG/5
10 SYRUP ORAL EVERY 6 HOURS PRN
Status: DISCONTINUED | OUTPATIENT
Start: 2020-05-27 | End: 2020-05-28 | Stop reason: HOSPADM

## 2020-05-27 RX ADMIN — CEFEPIME HYDROCHLORIDE 2 G: 2 INJECTION, SOLUTION INTRAVENOUS at 12:05

## 2020-05-27 RX ADMIN — INSULIN ASPART 5 UNITS: 100 INJECTION, SOLUTION INTRAVENOUS; SUBCUTANEOUS at 08:05

## 2020-05-27 RX ADMIN — CEFEPIME HYDROCHLORIDE 2 G: 2 INJECTION, SOLUTION INTRAVENOUS at 04:05

## 2020-05-27 RX ADMIN — INSULIN ASPART 10 UNITS: 100 INJECTION, SOLUTION INTRAVENOUS; SUBCUTANEOUS at 12:05

## 2020-05-27 RX ADMIN — INSULIN ASPART 10 UNITS: 100 INJECTION, SOLUTION INTRAVENOUS; SUBCUTANEOUS at 08:05

## 2020-05-27 RX ADMIN — VANCOMYCIN HYDROCHLORIDE 1750 MG: 500 INJECTION, POWDER, LYOPHILIZED, FOR SOLUTION INTRAVENOUS at 09:05

## 2020-05-27 RX ADMIN — CEFEPIME HYDROCHLORIDE 2 G: 2 INJECTION, SOLUTION INTRAVENOUS at 08:05

## 2020-05-27 RX ADMIN — CEFEPIME HYDROCHLORIDE 2 G: 2 INJECTION, SOLUTION INTRAVENOUS at 11:05

## 2020-05-27 RX ADMIN — GABAPENTIN 600 MG: 300 CAPSULE ORAL at 08:05

## 2020-05-27 RX ADMIN — ASPIRIN 81 MG: 81 TABLET ORAL at 08:05

## 2020-05-27 RX ADMIN — INSULIN DETEMIR 5 UNITS: 100 INJECTION, SOLUTION SUBCUTANEOUS at 09:05

## 2020-05-27 RX ADMIN — GUAIFENESIN AND DEXTROMETHORPHAN 10 ML: 100; 10 SYRUP ORAL at 05:05

## 2020-05-27 RX ADMIN — ATORVASTATIN CALCIUM 20 MG: 10 TABLET, FILM COATED ORAL at 08:05

## 2020-05-27 RX ADMIN — GUAIFENESIN AND DEXTROMETHORPHAN HYDROBROMIDE 1 TABLET: 600; 30 TABLET, EXTENDED RELEASE ORAL at 08:05

## 2020-05-27 RX ADMIN — ACETAMINOPHEN 650 MG: 325 TABLET ORAL at 05:05

## 2020-05-27 RX ADMIN — GABAPENTIN 600 MG: 300 CAPSULE ORAL at 04:05

## 2020-05-27 RX ADMIN — ACETAMINOPHEN 650 MG: 325 TABLET ORAL at 08:05

## 2020-05-27 RX ADMIN — INSULIN ASPART 10 UNITS: 100 INJECTION, SOLUTION INTRAVENOUS; SUBCUTANEOUS at 05:05

## 2020-05-27 NOTE — PROGRESS NOTES
[facility] Hospital Medicine  Progress Note    Patient Name: Cali Mabry  MRN: [id number]  Patient Class: IP- Inpatient   Admission Date: 5/25/2020  Length of Stay: 2 days  Attending Physician: Bridget Rene MD  Primary Care Provider: Cullen Zimmer MD        Subjective:     Principal Problem:Sepsis        HPI:    Cali Mabry is a 54 y.o. male that (in part)  has a past medical history of Acute on chronic pancreatitis, DKA (diabetic ketoacidoses), DM (diabetes mellitus), type 1, HCV (hepatitis C virus), HTN (hypertension), IVDU (intravenous drug user), and Pancreatitis.  has a past surgical history that includes Shoulder surgery (2013); Cardiac surgery (1999); and Esophagogastroduodenoscopy (N/A, 3/5/2020). Presents to [facility] Emergency Department with report report the patient had altered mental status per family beginning yesterday and progressively worsening.  EMS was activated.  At a capillary blood glucose of 294 on the scene.  Episode of nonbloody nonbilious vomiting yesterday.  Patient complained of a headache.  Known history of polysubstance abuse including cocaine, marijuana, heroin, and tobacco.  Patient became more obtunded while in transport.  History was limited upon arrival.      In the emergency department the patient was morbid tended and received Narcan with some improvement in his symptoms.  Routine laboratory studies, urinalysis, toxicology screen, and chest x-ray was performed.  Concern for opioid overdose in addition to diabetic ketoacidosis.  He was also febrile to 104°.  Cultures were obtained and broad-spectrum antibiotics were initiated.  Concern for aspiration pneumonia versus bacteremia secondary to IV drug use.  DKA protocol initiated including bolus of IV fluids and insulin.      Hospital medicine has been asked to admit to inpatient for further evaluation and treatment.     Overview/Hospital Course:  Mr Mabry presented with  acute encephalopathy. Treated for potential unintentional opiate overdose, severe sepsis and diabetic ketoacidosis. Woke up some after narcan given but reportedly not back to baseline. Admitted to ICU for insulin infusion and management of diabetic ketoacidosis. This resolved quickly and transitioned to SQ insulin. Encephalopathy resolved. Patient admitted to taking pain pills but getting of the street and not knowing what type of opiate. Toxicology screen positive for methadone, which he did not report getting a prescription for. As for sepsis, he was febrile to 104F on presentation, which resolved quickly. Urinalysis did not suggest infection. Blood cultures obtained. Has productive cough but no clear consolidation and no wheezing. On broad spectrum antibiotics. Stepped down to floor on 5/26 5/27/2020  Complained of being weak generalized.      Interval History: complaining of cough and chest pain with cough. Afebrile. AAO x 3. DKA resolved overnight.     Review of Systems   Constitutional: Positive for activity change.   Respiratory: Positive for cough. Negative for shortness of breath.    Cardiovascular: Negative for chest pain.   Gastrointestinal: Negative.    Neurological: Positive for weakness.     Objective:     Vital Signs (Most Recent):  Temp: 97.2 °F (36.2 °C) (05/27/20 1618)  Pulse: 89 (05/27/20 1618)  Resp: 17 (05/27/20 1618)  BP: 133/62 (05/27/20 1618)  SpO2: 98 % (05/27/20 1618) Vital Signs (24h Range):  Temp:  [97.2 °F (36.2 °C)-101.3 °F (38.5 °C)] 97.2 °F (36.2 °C)  Pulse:  [] 89  Resp:  [17-24] 17  SpO2:  [94 %-100 %] 98 %  BP: (118-141)/(58-76) 133/62     Weight: 66.3 kg (146 lb 2.6 oz)  Body mass index is 22.22 kg/m².    Intake/Output Summary (Last 24 hours) at 5/27/2020 1831  Last data filed at 5/27/2020 1709  Gross per 24 hour   Intake 1320 ml   Output 2200 ml   Net -880 ml      Physical Exam   Constitutional: He is oriented to person, place, and time. He appears well-developed. No  distress.   Appears older than stated age   Cardiovascular: Normal rate and regular rhythm.   Pulmonary/Chest: Effort normal and breath sounds normal. He has no wheezes. He has no rales. He exhibits no tenderness.   Coughing up yellow sputum   Abdominal: Soft.   Musculoskeletal: He exhibits no edema.   Neurological: He is alert and oriented to person, place, and time.   Skin: Skin is warm. Capillary refill takes less than 2 seconds. He is not diaphoretic.   Psychiatric: He has a normal mood and affect. His behavior is normal. Judgment and thought content normal.   Nursing note and vitals reviewed.      Significant Labs: All pertinent labs within the past 24 hours have been reviewed.    Significant Imaging: I have reviewed all pertinent imaging results/findings within the past 24 hours.  I have reviewed and interpreted all pertinent imaging results/findings within the past 24 hours.      Assessment/Plan:      * Sepsis  Met criteria with fever + tachycardia + suspected bacteremia (denied recent IV drug use) vs aspiration pneumonia not yet apparent on chest radiography. Antibiotics ordered in the ED were not continued on admission. I will resume vancomycin and cefepime pending culture results. Will add sputum culture. Repeat COVID test pending as he is still high probability given cough, high CRP and leukopenia. Continue COVID precautions for now. Vitals q 4 hours. Encouraged PO intake.       5/27/2020  Resolved  Cultures are negative   Continue to observe for now      Hypomagnesemia  Replaced. Now WNL      Polysubstance abuse  Spent > 5 minutes discussing cessation. Does not seem motivated to quit      Opioid abuse  Spent > 5 minutes discussing cessation. Does not seem motivated to quit      Marijuana abuse  Spent > 5 minutes discussing cessation. Does not seem motivated to quit      Cocaine abuse  Spent > 5 minutes discussing cessation. Does not seem motivated to quit      Tobacco use disorder, moderate,  "dependence  Spent > 5 minutes discussing cessation  Is not motivated to quit as he states he only smokes "a little"  Not interested in nicotine patch      Chronic relapsing pancreatitis  No acute issues  Use non narcotic analgesics      Chronic hepatitis C  No acute issues  F/u with Hepatology as outpatient      Intravenous drug abuse  Denied recent IV drug use  Spent > 5 minutes discussing cessation. Does not seem motivated to quit      Diabetic ketoacidosis with coma associated with type 1 diabetes mellitus  Resolved. Transitioned to SQ insulin and thus far stable  POC BG AC/HS  Diabetic diet      Type 1 diabetes mellitus with hyperglycemia  Hgb A1c 6.9%, which is not bad  DKA resolved. Now on SQ insulin  Spent > 5 minutes discussing importance of compliance with insulin. Verbalized understanding    5/27/2020  Glucose level normalizing  Doing well.   Well be followed  D/C planning initiated.         VTE Risk Mitigation (From admission, onward)         Ordered     IP VTE LOW RISK PATIENT  Once      05/26/20 0056     Place DAWIT hose  Until discontinued      05/26/20 0056     Place sequential compression device  Until discontinued      05/26/20 0056                      Bridget Rene MD  Department of Hospital Medicine   [facility]  "

## 2020-05-27 NOTE — PLAN OF CARE
Aox4. Very argumentative and attention seeking. Denies pain. Remains free from falls. Uses urinal at bedside. IV abx given per orders. VSS. Continued on 2L O2 per NC. O2 94% and better when O2 in place. SOB noted after activity. Encouraged to call for OOB assistance. RR monitored. BG monitored. Temp monitored. Tylenol given for elevated temp.  Will continue with POC.   Problem: Fall Injury Risk  Goal: Absence of Fall and Fall-Related Injury  Outcome: Ongoing, Progressing     Problem: Adult Inpatient Plan of Care  Goal: Plan of Care Review  Outcome: Ongoing, Progressing  Goal: Patient-Specific Goal (Individualization)  Outcome: Ongoing, Progressing  Goal: Absence of Hospital-Acquired Illness or Injury  Outcome: Ongoing, Progressing  Goal: Optimal Comfort and Wellbeing  Outcome: Ongoing, Progressing     Problem: Diabetes Comorbidity  Goal: Blood Glucose Level Within Desired Range  Outcome: Ongoing, Progressing     Problem: Adjustment to Illness (Sepsis/Septic Shock)  Goal: Optimal Coping  Outcome: Ongoing, Progressing     Problem: Bleeding (Sepsis/Septic Shock)  Goal: Absence of Bleeding  Outcome: Ongoing, Progressing     Problem: Glycemic Control Impaired (Sepsis/Septic Shock)  Goal: Blood Glucose Level Within Desired Range  Outcome: Ongoing, Progressing     Problem: Hemodynamic Instability (Sepsis/Septic Shock)  Goal: Effective Tissue Perfusion  Outcome: Ongoing, Progressing     Problem: Infection (Sepsis/Septic Shock)  Goal: Absence of Infection Signs/Symptoms  Outcome: Ongoing, Progressing     Problem: Nutrition Impaired (Sepsis/Septic Shock)  Goal: Optimal Nutrition Intake  Outcome: Ongoing, Progressing     Problem: Respiratory Compromise (Sepsis/Septic Shock)  Goal: Effective Oxygenation and Ventilation  Outcome: Ongoing, Progressing     Problem: Fluid Imbalance (Pneumonia)  Goal: Fluid Balance  Outcome: Ongoing, Progressing     Problem: Infection (Pneumonia)  Goal: Resolution of Infection  Signs/Symptoms  Outcome: Ongoing, Progressing     Problem: Respiratory Compromise (Pneumonia)  Goal: Effective Oxygenation and Ventilation  Outcome: Ongoing, Progressing     Problem: Skin Injury Risk Increased  Goal: Skin Health and Integrity  Outcome: Ongoing, Progressing

## 2020-05-27 NOTE — NURSING
[facility]  RN Proactive Rounding Note    Date of Visit: 05/26/2020  Time of Visit: 2100    Admit Date: 5/25/2020  LOS: 1    Code Status: Full Code     Attending Physician: Bridget Rene MD    TRIGGER:    Reason for Round:  RN concerned    ASSESSMENT:     Abnormal Vital Signs:  Clinical Issues: Temp 101.3     INTERVENTIONS/ RECOMMENDATIONS:     Give tylenol    Discussed plan of care with RNVicki.    PHYSICIAN ESCALATION:     Yes/No  no    Orders received and case discussed with    .    Disposition: Remain in room 417.    FOLLOW-UP/CONTINGENCY:     Call back the Rapid Response Nurse at [phone] for additional questions or concerns.

## 2020-05-27 NOTE — PLAN OF CARE
SW went to pt's room to discuss help at home. Pt stated that he has no help at home and will not need help. SW placed name and number on white communication board. SW informed pt that he can contact me for all discharge planning needs.

## 2020-05-27 NOTE — ASSESSMENT & PLAN NOTE
Met criteria with fever + tachycardia + suspected bacteremia (denied recent IV drug use) vs aspiration pneumonia not yet apparent on chest radiography. Antibiotics ordered in the ED were not continued on admission. I will resume vancomycin and cefepime pending culture results. Will add sputum culture. Repeat COVID test pending as he is still high probability given cough, high CRP and leukopenia. Continue COVID precautions for now. Vitals q 4 hours. Encouraged PO intake.       5/27/2020  Resolved  Cultures are negative   Continue to observe for now

## 2020-05-27 NOTE — NURSING
Patient is A&Ox4 with peripheral IV in L hand and R arm, and makes needs known. Patient remained free from falls during this shift. Patient has been getting IV antibiotics, and is on room air. VSS. Patient given PRN pain medication upon request. No acute distress noted at this time. Bed in low position, call bell within reach. Patient is able to ambulate with standby assist.

## 2020-05-27 NOTE — PLAN OF CARE
Problem: Fall Injury Risk  Goal: Absence of Fall and Fall-Related Injury  Outcome: Ongoing, Progressing  Intervention: Identify and Manage Contributors to Fall Injury Risk  Flowsheets (Taken 5/27/2020 1713)  Self-Care Promotion: independence encouraged; BADL personal objects within reach; BADL personal routines maintained  Medication Review/Management: medications reviewed  Intervention: Promote Injury-Free Environment  Flowsheets (Taken 5/27/2020 1713)  Safety Promotion/Fall Prevention: side rails raised x 2; room near unit station; nonskid shoes/socks when out of bed; medications reviewed; Fall Risk reviewed with patient/family; assistive device/personal item within reach  Environmental Safety Modification: assistive device/personal items within reach; room near unit station; room organization consistent; clutter free environment maintained     Problem: Adult Inpatient Plan of Care  Goal: Plan of Care Review  Outcome: Ongoing, Progressing  Goal: Patient-Specific Goal (Individualization)  Outcome: Ongoing, Progressing  Goal: Absence of Hospital-Acquired Illness or Injury  Outcome: Ongoing, Progressing  Intervention: Identify and Manage Fall Risk  Flowsheets (Taken 5/27/2020 1713)  Safety Promotion/Fall Prevention: side rails raised x 2; room near unit station; nonskid shoes/socks when out of bed; medications reviewed; Fall Risk reviewed with patient/family; assistive device/personal item within reach  Intervention: Prevent VTE (venous thromboembolism)  Flowsheets (Taken 5/27/2020 1713)  VTE Prevention/Management: ROM (active) performed; bleeding risk assessed  Goal: Optimal Comfort and Wellbeing  Outcome: Ongoing, Progressing  Intervention: Provide Person-Centered Care  Flowsheets (Taken 5/27/2020 1713)  Trust Relationship/Rapport: care explained; questions encouraged; questions answered  Goal: Readiness for Transition of Care  Outcome: Ongoing, Progressing  Goal: Rounds/Family Conference  Outcome: Ongoing,  Progressing     Problem: Diabetes Comorbidity  Goal: Blood Glucose Level Within Desired Range  Outcome: Ongoing, Progressing  Intervention: Maintain Glycemic Control  Flowsheets (Taken 5/27/2020 1713)  Glycemic Management: blood glucose monitoring     Problem: Adjustment to Illness (Sepsis/Septic Shock)  Goal: Optimal Coping  Outcome: Ongoing, Progressing  Intervention: Optimize Psychosocial Adjustment to Illness  Flowsheets (Taken 5/27/2020 1713)  Family/Support System Care: self-care encouraged     Problem: Bleeding (Sepsis/Septic Shock)  Goal: Absence of Bleeding  Outcome: Ongoing, Progressing     Problem: Glycemic Control Impaired (Sepsis/Septic Shock)  Goal: Blood Glucose Level Within Desired Range  Outcome: Ongoing, Progressing  Intervention: Optimize Glycemic Control  Flowsheets (Taken 5/27/2020 1713)  Glycemic Management: blood glucose monitoring     Problem: Hemodynamic Instability (Sepsis/Septic Shock)  Goal: Effective Tissue Perfusion  Outcome: Ongoing, Progressing  Intervention: Optimize Blood Flow  Flowsheets (Taken 5/27/2020 1713)  Fluid/Electrolyte Management: fluids provided     Problem: Infection (Sepsis/Septic Shock)  Goal: Absence of Infection Signs/Symptoms  Outcome: Ongoing, Progressing  Intervention: Prevent or Manage Infection Progression  Flowsheets (Taken 5/27/2020 1713)  Fever Reduction/Comfort Measures: --  Isolation Precautions: airborne precautions maintained     Problem: Nutrition Impaired (Sepsis/Septic Shock)  Goal: Optimal Nutrition Intake  Outcome: Ongoing, Progressing     Problem: Respiratory Compromise (Sepsis/Septic Shock)  Goal: Effective Oxygenation and Ventilation  Outcome: Ongoing, Progressing  Intervention: Promote Airway Secretion Clearance  Flowsheets (Taken 5/27/2020 1713)  Cough And Deep Breathing: done independently per patient  Activity Management: activity clustered for rest period; activity adjusted per tolerance  Intervention: Optimize Oxygenation and  Ventilation  Flowsheets (Taken 5/27/2020 1713)  Airway/Ventilation Management: --  Head of Bed (HOB): HOB at 30 degrees     Problem: Fluid Imbalance (Pneumonia)  Goal: Fluid Balance  Outcome: Ongoing, Progressing  Intervention: Monitor and Manage Fluid Balance  Flowsheets (Taken 5/27/2020 1713)  Fluid/Electrolyte Management: fluids provided     Problem: Infection (Pneumonia)  Goal: Resolution of Infection Signs/Symptoms  Outcome: Ongoing, Progressing  Intervention: Prevent Infection Progression  Flowsheets (Taken 5/27/2020 1713)  Fever Reduction/Comfort Measures: medication administered  Isolation Precautions: airborne precautions maintained     Problem: Respiratory Compromise (Pneumonia)  Goal: Effective Oxygenation and Ventilation  Outcome: Ongoing, Progressing  Intervention: Promote Airway Secretion Clearance  Flowsheets (Taken 5/27/2020 1713)  Cough And Deep Breathing: done independently per patient  Intervention: Optimize Oxygenation and Ventilation  Flowsheets (Taken 5/27/2020 1713)  Airway/Ventilation Management: calming measures promoted  Head of Bed (HOB): HOB at 30 degrees     Problem: Skin Injury Risk Increased  Goal: Skin Health and Integrity  Outcome: Ongoing, Progressing  Intervention: Optimize Skin Protection  Flowsheets (Taken 5/27/2020 1713)  Pressure Reduction Techniques: frequent weight shift encouraged  Head of Bed (HOB): HOB at 30 degrees  Intervention: Promote and Optimize Oral Intake  Flowsheets (Taken 5/27/2020 1713)  Oral Nutrition Promotion: rest periods promoted

## 2020-05-27 NOTE — SUBJECTIVE & OBJECTIVE
Interval History: complaining of cough and chest pain with cough. Afebrile. AAO x 3. DKA resolved overnight.     Review of Systems   Constitutional: Positive for activity change.   Respiratory: Positive for cough. Negative for shortness of breath.    Cardiovascular: Negative for chest pain.   Gastrointestinal: Negative.    Neurological: Positive for weakness.     Objective:     Vital Signs (Most Recent):  Temp: 97.2 °F (36.2 °C) (05/27/20 1618)  Pulse: 89 (05/27/20 1618)  Resp: 17 (05/27/20 1618)  BP: 133/62 (05/27/20 1618)  SpO2: 98 % (05/27/20 1618) Vital Signs (24h Range):  Temp:  [97.2 °F (36.2 °C)-101.3 °F (38.5 °C)] 97.2 °F (36.2 °C)  Pulse:  [] 89  Resp:  [17-24] 17  SpO2:  [94 %-100 %] 98 %  BP: (118-141)/(58-76) 133/62     Weight: 66.3 kg (146 lb 2.6 oz)  Body mass index is 22.22 kg/m².    Intake/Output Summary (Last 24 hours) at 5/27/2020 1831  Last data filed at 5/27/2020 1709  Gross per 24 hour   Intake 1320 ml   Output 2200 ml   Net -880 ml      Physical Exam   Constitutional: He is oriented to person, place, and time. He appears well-developed. No distress.   Appears older than stated age   Cardiovascular: Normal rate and regular rhythm.   Pulmonary/Chest: Effort normal and breath sounds normal. He has no wheezes. He has no rales. He exhibits no tenderness.   Coughing up yellow sputum   Abdominal: Soft.   Musculoskeletal: He exhibits no edema.   Neurological: He is alert and oriented to person, place, and time.   Skin: Skin is warm. Capillary refill takes less than 2 seconds. He is not diaphoretic.   Psychiatric: He has a normal mood and affect. His behavior is normal. Judgment and thought content normal.   Nursing note and vitals reviewed.      Significant Labs: All pertinent labs within the past 24 hours have been reviewed.    Significant Imaging: I have reviewed all pertinent imaging results/findings within the past 24 hours.  I have reviewed and interpreted all pertinent imaging  results/findings within the past 24 hours.

## 2020-05-27 NOTE — NURSING
MEWS Monitoring: MEWS 3. Chart check completed, abnormal VS noted, bedside RNVicki contacted, no concerns verbalized at this time, instructed to call [phone] for further concerns or assistance..

## 2020-05-27 NOTE — ASSESSMENT & PLAN NOTE
Hgb A1c 6.9%, which is not bad  DKA resolved. Now on SQ insulin  Spent > 5 minutes discussing importance of compliance with insulin. Verbalized understanding    5/27/2020  Glucose level normalizing  Doing well.   Well be followed  D/C planning initiated.

## 2020-05-27 NOTE — NURSING
Pt called to nursing station to report SOB. Assessed pt O2 and pulse. O2- 94% and 110 pulse, RR-22. Pt stated that he stood up to use urinal at bedside, O2 noted not to be in place. Denies dizziness or pain. Pt back in bed, educated to keep O2 in place and encouraged to call for OOB assistance. Pt verbally agrees at this time.

## 2020-05-28 VITALS
DIASTOLIC BLOOD PRESSURE: 76 MMHG | SYSTOLIC BLOOD PRESSURE: 145 MMHG | BODY MASS INDEX: 22.16 KG/M2 | HEART RATE: 87 BPM | RESPIRATION RATE: 18 BRPM | HEIGHT: 68 IN | WEIGHT: 146.19 LBS | TEMPERATURE: 98 F | OXYGEN SATURATION: 93 %

## 2020-05-28 LAB
POCT GLUCOSE: 108 MG/DL (ref 70–110)
POCT GLUCOSE: 142 MG/DL (ref 70–110)
POCT GLUCOSE: 163 MG/DL (ref 70–110)
POCT GLUCOSE: 181 MG/DL (ref 70–110)
POCT GLUCOSE: 450 MG/DL (ref 70–110)
POCT GLUCOSE: 89 MG/DL (ref 70–110)
POCT GLUCOSE: >500 MG/DL (ref 70–110)
POCT GLUCOSE: >500 MG/DL (ref 70–110)

## 2020-05-28 PROCEDURE — 25000003 PHARM REV CODE 250: Performed by: INTERNAL MEDICINE

## 2020-05-28 PROCEDURE — 63600175 PHARM REV CODE 636 W HCPCS: Performed by: INTERNAL MEDICINE

## 2020-05-28 PROCEDURE — 94761 N-INVAS EAR/PLS OXIMETRY MLT: CPT

## 2020-05-28 RX ADMIN — ASPIRIN 81 MG: 81 TABLET ORAL at 09:05

## 2020-05-28 RX ADMIN — GABAPENTIN 600 MG: 300 CAPSULE ORAL at 08:05

## 2020-05-28 RX ADMIN — GUAIFENESIN AND DEXTROMETHORPHAN HYDROBROMIDE 1 TABLET: 600; 30 TABLET, EXTENDED RELEASE ORAL at 08:05

## 2020-05-28 RX ADMIN — ATORVASTATIN CALCIUM 20 MG: 10 TABLET, FILM COATED ORAL at 08:05

## 2020-05-28 RX ADMIN — INSULIN ASPART 10 UNITS: 100 INJECTION, SOLUTION INTRAVENOUS; SUBCUTANEOUS at 11:05

## 2020-05-28 RX ADMIN — CEFEPIME HYDROCHLORIDE 2 G: 2 INJECTION, SOLUTION INTRAVENOUS at 08:05

## 2020-05-28 RX ADMIN — INSULIN DETEMIR 5 UNITS: 100 INJECTION, SOLUTION SUBCUTANEOUS at 09:05

## 2020-05-28 NOTE — PLAN OF CARE
Problem: Adult Inpatient Plan of Care  Goal: Plan of Care Review  Outcome: Ongoing, Progressing  Flowsheets (Taken 5/28/2020 0242)  Plan of Care Reviewed With: patient     Patient remains free from injury and falls. AAOx3. Blood glucose elevated at 487 this shift. Covered with sliding scale. IV antibiotics administered. Refused vital signs. Plan of care continued.

## 2020-05-28 NOTE — PLAN OF CARE
05/28/20 1340   Final Note   Assessment Type Final Discharge Note   Anticipated Discharge Disposition Left Against   Hospital Follow Up  Appt(s) scheduled? No   Discharge plans and expectations educations in teach back method with documentation complete? No   Right Care Referral Info   Post Acute Recommendation No Care

## 2020-05-28 NOTE — NURSING
Patient refused to let staff take 0400 v/s, refused accu check, and refused to all staff to take out trash.

## 2020-05-28 NOTE — PHYSICIAN QUERY
PT Name: Cali Mabry  MR #: [id number]     Physician Query Form - Documentation Clarification      CDS/: Tana Holcomb RN CDI      Contact information: [email or web address]    This form is a permanent document in the medical record.     Query Date: May 28, 2020    By submitting this query, we are merely seeking further clarification of documentation. Please utilize your independent clinical judgment when addressing the question(s) below.    The Medical Record reflects the following:    Clinical Findings Location in Medical Record   Sepsis   bacteremia      presented with acute encephalopathy. Treated for potential unintentional opiate overdose, severe sepsis and diabetic ketoacidosis.  Sepsis  Met criteria with fever + tachycardia + suspected bacteremia (denied recent IV drug use) vs aspiration pneumonia not yet apparent on chest radiography. Antibiotics ordered in the ED were not continued on admission. I will resume vancomycin and cefepime pending culture results.     Sepsis   5/27/2020  Resolved  Cultures are negative   Continue to observe for now   ER MD Note 5/25   ALMAZ Victor MD    H med 5/26   TAHIR Kelly MD                  H Med 5/27   Bridget Rene MD     5/25   Temp = 104.2    HR = 144   RR = 31   BP = 158/75    Sat = 86%  WBC 5/25 = 9.37    5/26 = 13.76  5/27 = 8.90    Lactic  5/25 - 2051 = 2.8  2219 = 1.6  Procalcitonin 5/25 = 1.22    Blood culutres 5/25 - no growth   COVID-19 testing - negative  Respiratory culture - 5/27 = GRAM NEGATIVE RANDALL - Moderate        Vitals      Labs      Labs                                                                            Please clarify the diagnosis of Sepsis.      Provider Use Only       [  ] Sepsis ruled in,            Specify source________________, organism__________.           additional clinical support/ decision making indicators for the diagnosis           include, specifiy______________.                 [ x ] Sepsis has been ruled out        [  ] Sepsis  has been ruled out, other diagnosis ruled in (specify):___________.          [  ] Other clarification (specify): ______________         [  ] Clinically undetermined         Present on admission (POA) status:   [   ] Yes (Y)                          [  ] Clinically Undetermined (W)  [   ] No (N)                            [   ] Documentation insufficient to determine if condition is POA (U)

## 2020-05-28 NOTE — PHYSICIAN QUERY
PT Name: Cali Mabry  MR #: [id number]     CDS/: Tana Holcomb RN CDI       Contact information: [email or web address]  This form is a permanent document in the medical record.     Query Date: May 28, 2020    Physician Query - Neurological Condition Clarification    By submitting this query, we are merely seeking further clarification of documentation to reflect the severity of illness of your patient. Please utilize your independent clinical judgment when addressing the question(s) below.    The Medical record reflects the following:     Indicators   Supporting Clinical Findings Location in Medical Record   X AMS, Confusion,  LOC, etc.  presented with acute encephalopathy. Treated for potential unintentional opiate overdose, severe sepsis and diabetic ketoacidosis. Woke up some after narcan given but reportedly not back to baseline. Admitted to ICU for insulin infusion and management of diabetic ketoacidosis. This resolved quickly and transitioned to SQ insulin. Encephalopathy resolved. Patient admitted to taking pain pills but getting of the street and not knowing what type of opiate. Toxicology screen positive for methadone, which he did not report getting a prescription for.   Hospital medicine 5/26   TAHIR Kelly MD   X Acute / Chronic Illness Sepsis  DKA   Opiate overdose Hospital medicine 5/26   TAHIR Kelly MD    Radiology Findings     X Electrolyte Imbalance 5/25 NA = 133  5/25 CO2 = 19  5/25 Mg = 1.5 Labs    Medication      Treatment              Other       Encephalopathy- is a general term for any diffuse disease of the brain that alters brain function or structure. Treatment of the cognitive dysfunction varies but is ultimately dependent on the treatment of the underlying condition.    Major Symptoms of Encephalopathy - Decreased level of consciousness, fluctuating alertness/concentration, confusion, agitation, lethargy, somnolence, drowsiness, obtundation, stupor, or coma.         References: National  Institutes of Healths (NIH) National [place] of Neurological Disorders and Strokes;  HCPro 2016; Advisory Board     Clinical Guidelines:   These guidelines will set system standards to assist providers in managing, documentation, and coding of encephalopathy. The intent of this document is to serve as a system guideline, not replace the providers clinical judgment:  Provider, please specify the diagnosis or diagnoses associated with above clinical findings.  [x   ] Metabolic Encephalopathy - Due to electrolye imbalance, metabolic derangements, or infections processes, includes Septic Encephalopathy   [   ] Toxic Encephalopathy - Due to drugs, chemicals, or other toxic substances   [   ] Other Encephalopathy - Includes uremic encephalopathy   [   ] Unspecified Encephalopathy      [   ] Other Neurological Condition-  Includes Post-ictal altered mental status. (please specify condition): _________   [   ]  Clinically Undetermined     Please document in your progress notes daily for the duration of treatment until resolved, and include in your discharge summary.

## 2020-05-28 NOTE — PHYSICIAN QUERY
PT Name: Cali Mabry  MR #: [id number]     Physician Query Form - Etiology of Condition Clarification      CDS/: Tana Holcomb               Contact information:  This form is a permanent document in the medical record.     Query Date: May 28, 2020    By submitting this query, we are merely seeking further clarification of documentation.  Please utilize your independent clinical judgment when addressing the question(s) below.     The medical record contains the following:    Supporting Clinical Information Location in Medical Record   Respiratory culture 5/25 - GRAM NEGATIVE RANDALL  - Moderate     In the emergency department the patient was morbid tended and received Narcan with some improvement in his symptoms.  Routine laboratory studies, urinalysis, toxicology screen, and chest x-ray was performed.  Concern for opioid overdose in addition to diabetic ketoacidosis.  He was also febrile to 104°.  Cultures were obtained and broad-spectrum antibiotics were initiated.            Concern for aspiration pneumonia     presented with acute encephalopathy. Treated for potential unintentional opiate overdose, severe sepsis and diabetic ketoacidosis. Woke up some after narcan given but reportedly not back to baseline.  aspiration pneumonia not yet apparent on chest radiography   complaining of cough and chest pain with cough.    Cardiac silhouette is normal in size.  Lungs are symmetrically expanded.  Chronic coarse interstitial lung changes and scarring are seen, more pronounced within the right lower lung zone, similar to recent radiograph and CT.  No evidence of new focal consolidative process.  No pneumothorax or significant pleural effusion.  No acute osseous abnormality identified. Lab      H&P 5/26 JENNY Wynn MD                  H Med 5/26 TAHIR Kelly MD                Chest xray 5/25     Please document your best medical opinion regarding the etiology of Respiratory culture with gram negative rods  for which  treatment is/was directed.     Provider use only       [   ] Aspiration pneumonia ruled in with            ( x ) gram negative gina.            (  ) Other, specify__________.            (  ) Unspecified       [   ] Pneumonia ruled in with            (  ) gram negative gina.            (  ) Other, specify__________.            (  ) Unspecified       [   ] Other, specify_________________.       [  ] Clinically Undetermined     Please document in your progress notes daily for the duration of treatment, until resolved, and include in your discharge summary.

## 2020-05-28 NOTE — PROGRESS NOTES
Pt called the nurses station to have his IV taken out, informed pt that the IV will not be removed until he is discharged. Pt states ok, I will take it out myself. Instructed pt not to attempt removal.

## 2020-05-28 NOTE — PROGRESS NOTES
"Pt called on call light yelling and screaming using profanities that "someone better fucking get in here" Informed pt not to yell and curse as we are here to help him. Pt continued to yell and curse.   "

## 2020-05-28 NOTE — PROGRESS NOTES
Upon entering pts room pt states he disconnected himself from the IV. Educated pt on not disconnecting the IV to keep the tubing clean.

## 2020-05-29 LAB
BACTERIA BLD CULT: NORMAL
BACTERIA BLD CULT: NORMAL
BACTERIA SPEC AEROBE CULT: ABNORMAL
GRAM STN SPEC: ABNORMAL

## 2020-05-30 LAB — BACTERIA BLD CULT: NORMAL

## 2020-05-31 LAB
ACID FAST MOD KINY STN SPEC: NORMAL
MYCOBACTERIUM SPEC QL CULT: NORMAL

## 2020-06-01 LAB
ACID FAST MOD KINY STN SPEC: NORMAL
ACID FAST MOD KINY STN SPEC: NORMAL
MYCOBACTERIUM SPEC QL CULT: NORMAL
MYCOBACTERIUM SPEC QL CULT: NORMAL

## 2020-06-02 LAB
ACID FAST MOD KINY STN SPEC: NORMAL
MYCOBACTERIUM SPEC QL CULT: NORMAL
POCT GLUCOSE: 293 MG/DL (ref 70–110)
POCT GLUCOSE: 302 MG/DL (ref 70–110)
POCT GLUCOSE: 312 MG/DL (ref 70–110)

## 2020-06-03 NOTE — DISCHARGE SUMMARY
[facility] Hospital Medicine  Discharge Summary      Patient Name: Cali Mabry  MRN: [id number]  Admission Date: 5/25/2020  Hospital Length of Stay: 3 days  Discharge Date and Time: 5/28/2020  1:29 PM  Attending Physician: Tiff att. providers found   Discharging Provider: Bridget Rene MD  Primary Care Provider: Cullen Zimmer MD        HPI: Cali Mabry is a 54 y.o. male that (in part)  has a past medical history of Acute on chronic pancreatitis, DKA (diabetic ketoacidoses), DM (diabetes mellitus), type 1, HCV (hepatitis C virus), HTN (hypertension), IVDU (intravenous drug user), and Pancreatitis.  has a past surgical history that includes Shoulder surgery (2013); Cardiac surgery (1999); and Esophagogastroduodenoscopy (N/A, 3/5/2020). Presents to [facility] Emergency Department with report report the patient had altered mental status per family beginning yesterday and progressively worsening.  EMS was activated.  At a capillary blood glucose of 294 on the scene.  Episode of nonbloody nonbilious vomiting yesterday.  Patient complained of a headache.  Known history of polysubstance abuse including cocaine, marijuana, heroin, and tobacco.  Patient became more obtunded while in transport.  History was limited upon arrival.       In the emergency department the patient was morbid tended and received Narcan with some improvement in his symptoms.  Routine laboratory studies, urinalysis, toxicology screen, and chest x-ray was performed.  Concern for opioid overdose in addition to diabetic ketoacidosis.  He was also febrile to 104°.  Cultures were obtained and broad-spectrum antibiotics were initiated.  Concern for aspiration pneumonia versus bacteremia secondary to IV drug use.  DKA protocol initiated including bolus of IV fluids and insulin.       Hospital medicine has been asked to admit to inpatient for further evaluation and treatment.   * No surgery found *     "  Hospital Course: Mr Mabry presented with acute encephalopathy. Treated for potential unintentional opiate overdose, severe sepsis and diabetic ketoacidosis. Woke up some after narcan given but reportedly not back to baseline. Admitted to ICU for insulin infusion and management of diabetic ketoacidosis. This resolved quickly and transitioned to SQ insulin. Encephalopathy resolved. Patient admitted to taking pain pills but getting of the street and not knowing what type of opiate. Toxicology screen positive for methadone, which he did not report getting a prescription for. As for sepsis, he was febrile to 104F on presentation, which resolved quickly. Urinalysis did not suggest infection. Blood cultures obtained. Has productive cough but no clear consolidation and no wheezing. On broad spectrum antibiotics. Stepped down to floor on 5/26.    On the floor his sugars initially stayed stable, later on they went upto 400 plus, and he was advised to let his anion gap be checked while his high sugars were being managed with high doses of insulin. The patient refused to comply with the above medical advice, stating "last time I was in hospital for 30 days, and it was like a FPC". He was reassured and provided due explanation and expected time line that his blood glucose level will improve with appropriate treatment. He was adamant about not pursing treatment any more and wanted to leave the hospital. The patient left Against medical advice.     Consults:     Final Active Diagnoses:    Diagnosis Date Noted POA    PRINCIPAL PROBLEM:  Sepsis [A41.9] 03/01/2020 Yes    Hypomagnesemia [E83.42] 05/25/2020 Yes    Polysubstance abuse [F19.10] 04/23/2020 Yes    Opioid abuse [F11.10] 08/05/2018 Yes     Chronic    Cocaine abuse [F14.10] 08/05/2018 Yes    Marijuana abuse [F12.10] 08/05/2018 Yes    Intravenous drug abuse [F19.10] 11/23/2017 Yes     Chronic    Chronic hepatitis C [B18.2] 11/23/2017 Yes     Chronic    " Tobacco use disorder, moderate, dependence [F17.200] 11/23/2017 Yes    Chronic relapsing pancreatitis [K86.1] 11/23/2017 Yes     Chronic    Diabetic ketoacidosis with coma associated with type 1 diabetes mellitus [E10.11] 07/22/2016 Yes    Type 1 diabetes mellitus with hyperglycemia [E10.65] 04/03/2016 Yes      Problems Resolved During this Admission:      Discharged Condition: stable    Disposition: Home or Self Care    Follow Up:    Patient Instructions:   No discharge procedures on file.  Medications:  Reconciled Home Medications:      Medication List      ASK your doctor about these medications    acetaminophen 650 MG Tbsr  Commonly known as:  TYLENOL  Take 650 mg by mouth every 8 (eight) hours as needed.     albuterol-ipratropium 2.5 mg-0.5 mg/3 mL nebulizer solution  Commonly known as:  DUO-NEB  Take 3 mLs by nebulization every 4 (four) hours as needed for Wheezing. Rescue     ALPRAZolam 0.5 MG tablet  Commonly known as:  XANAX  Take 0.5 mg by mouth 2 (two) times daily as needed for Anxiety.     ASCORBIC ACID (VITAMIN C) ORAL  Take 1 tablet by mouth once daily.     aspirin 325 MG EC tablet  Commonly known as:  ECOTRIN  Take 1 tablet (325 mg total) by mouth once daily.     atorvastatin 20 MG tablet  Commonly known as:  LIPITOR  Take 20 mg by mouth once daily.     b complex vitamins tablet  Take 1 tablet by mouth once daily.     baclofen 10 MG tablet  Commonly known as:  LIORESAL  Take 10 mg by mouth 3 (three) times daily.     bisacodyL 10 mg Supp  Commonly known as:  DULCOLAX  Place 10 mg rectally daily as needed.     blood sugar diagnostic Strp  Use To check blood glucose three times daily,     blood-glucose meter Misc  To check Blood glucose three times daily,     calcium carbonate 200 mg calcium (500 mg) chewable tablet  Commonly known as:  TUMS  Take 1 tablet (500 mg total) by mouth daily as needed for Heartburn.     diphenhydrAMINE 25 mg capsule  Commonly known as:  BENADRYL  Take 25 mg by mouth every 6  (six) hours as needed for Itching.     divalproex 125 MG EC tablet  Commonly known as:  DEPAKOTE  Take 125 mg by mouth every 8 (eight) hours.     docusate sodium 100 MG capsule  Commonly known as:  COLACE  Take 1 capsule (100 mg total) by mouth 2 (two) times daily as needed for Constipation.     DULoxetine 60 MG capsule  Commonly known as:  CYMBALTA  Take 1 capsule (60 mg total) by mouth once daily.     folic acid 800 MCG Tab  Commonly known as:  FOLVITE  Take 800 mcg by mouth once daily.     folic acid-vit B6-vit B12 2.5-25-2 mg 2.5-25-2 mg Tab  Commonly known as:  FOLBIC or Equiv  Take 1 tablet by mouth once daily.     gabapentin 300 MG capsule  Commonly known as:  NEURONTIN  Take 2 capsules (600 mg total) by mouth 3 (three) times daily.     GLUCAGEN HYPOKIT 1 mg Solr  Generic drug:  glucagon (human recombinant)  Inject 1 mg into the muscle as needed.     glucose 4 GM chewable tablet  Take 16 g by mouth as needed for Low blood sugar.     HALOPERIDOL LACTATE INJ  Inject 1 mg as directed every 4 (four) hours as needed.     hydrOXYzine HCL 25 MG tablet  Commonly known as:  ATARAX  Take 1 tablet (25 mg total) by mouth 3 (three) times daily as needed for Anxiety.     insulin aspart U-100 100 unit/mL injection  Commonly known as:  NOVOLOG  Inject 1-10 Units into the skin 3 (three) times daily with meals.     insulin detemir U-100 100 unit/mL injection  Commonly known as:  LEVEMIR  Inject 25 Units into the skin 2 (two) times a day.     insulin lispro 100 unit/mL injection  Inject 10 Units into the skin 3 (three) times daily before meals.     lancets Misc  To check BG 3 times daily, to use with insurance preferred meter     LANTUS U-100 INSULIN 100 unit/mL injection  Generic drug:  insulin glargine  Inject 22 Units into the skin 2 (two) times daily.     * lidocaine 5 %  Commonly known as:  LIDODERM  Place 1 patch onto the skin once daily. Remove & Discard patch within 12 hours or as directed by MD.  Apply to shoulder for  pain     * lidocaine 5 %  Commonly known as:  LIDODERM  Place 1 patch onto the skin daily as needed. Remove & Discard patch within 12 hours or as directed by MD.  Apply to back for pain     melatonin 3 mg tablet  Commonly known as:  MELATIN  Take 2 tablets (6 mg total) by mouth nightly as needed for Insomnia.     mineral oil enema  Commonly known as:  FLEET OIL RETENTION  Place 1 enema rectally daily as needed for Constipation.     MORPHINE INJ  Inject 1 mg as directed every 8 (eight) hours as needed.     multivitamin per tablet  Commonly known as:  THERAGRAN  Take 1 tablet by mouth once daily.     ondansetron 2 mg/mL injection  Commonly known as:  ZOFRAN  Inject 4 mg into the vein every 6 (six) hours as needed.     ondansetron 8 MG Tbdl  Commonly known as:  ZOFRAN-ODT  Take 1 tablet (8 mg total) by mouth every 8 (eight) hours as needed.     oxyCODONE 10 mg Tab immediate release tablet  Commonly known as:  ROXICODONE  Take 10 mg by mouth every 6 (six) hours as needed for Pain.     oxyCODONE-acetaminophen 5-325 mg per tablet  Commonly known as:  PERCOCET  Take 1 tablet by mouth every 4 (four) hours as needed for Pain.     polyethylene glycol 17 gram Pwpk  Commonly known as:  GLYCOLAX  Take 17 g by mouth daily as needed (Constipation).     QUEtiapine 50 MG tablet  Commonly known as:  SEROQUEL  Take 50 mg by mouth every evening.     RISAQUAD ORAL  Take 1 capsule by mouth once daily.     traZODone 50 MG tablet  Commonly known as:  DESYREL  Take 25 mg by mouth every evening.         * This list has 2 medication(s) that are the same as other medications prescribed for you. Read the directions carefully, and ask your doctor or other care provider to review them with you.                Significant Diagnostic Studies: Labs: CMP No results for input(s): NA, K, CL, CO2, GLU, BUN, CREATININE, CALCIUM, PROT, ALBUMIN, BILITOT, ALKPHOS, AST, ALT, ANIONGAP, ESTGFRAFRICA, EGFRNONAA in the last 48 hours. and CBC No results for  input(s): WBC, HGB, HCT, PLT in the last 48 hours.    Pending Diagnostic Studies:     None        Indwelling Lines/Drains at time of discharge:   Lines/Drains/Airways     None                 Time spent on the discharge of patient: 20 minutes  Patient was seen and examined on the date of discharge and determined to be suitable for discharge.         Bridget Rene MD  Department of Hospital Medicine  [facility]

## 2020-06-29 DIAGNOSIS — M79.642 HAND PAIN, LEFT: Primary | ICD-10-CM

## 2020-07-11 ENCOUNTER — HOSPITAL ENCOUNTER (EMERGENCY)
Facility: HOSPITAL | Age: 55
Discharge: HOME | End: 2020-07-11
Attending: EMERGENCY MEDICINE
Payer: MEDICARE

## 2020-07-11 VITALS
OXYGEN SATURATION: 97 % | HEIGHT: 68 IN | TEMPERATURE: 100 F | RESPIRATION RATE: 22 BRPM | DIASTOLIC BLOOD PRESSURE: 80 MMHG | HEART RATE: 107 BPM | SYSTOLIC BLOOD PRESSURE: 143 MMHG | BODY MASS INDEX: 21.98 KG/M2 | WEIGHT: 145 LBS

## 2020-07-11 DIAGNOSIS — T50.901D ACCIDENTAL DRUG OVERDOSE, SUBSEQUENT ENCOUNTER: Primary | ICD-10-CM

## 2020-07-11 PROCEDURE — 99283 EMERGENCY DEPT VISIT LOW MDM: CPT

## 2020-07-11 RX ORDER — NALOXONE HYDROCHLORIDE 4 MG/.1ML
SPRAY NASAL
Qty: 1 EACH | Refills: 11 | Status: SHIPPED | OUTPATIENT
Start: 2020-07-11 | End: 2020-09-08 | Stop reason: SDUPTHER

## 2020-07-11 NOTE — ED TRIAGE NOTES
Pt found by family, unresponsive, EMS gave 1mg of narcan IV with and pt became responsive; pt admits to heroin use today. AAOx4. pt states he wants to leave.

## 2020-07-11 NOTE — ED PROVIDER NOTES
Encounter Date: 2020       History     Chief Complaint   Patient presents with    Drug Overdose     EMS reports pt found unresponsive by family members today just pta, given 1mg of narcan IV with +response. admits to heroin use today. AAOx4. pt states he wants to leave. encouraged to stay to be seen by a doctor, pt agrees       Chief complaint:  Overdose    History of present illness:  54-year-old male with a history of IV heroin abuse presents emergency department after being found unresponsive by family members.  He was given naloxone with good response.  He denies any other alcohol or drug use today.  He states he was on methadone in the past and would like to get back on it.  He states he plans to follow up in methadone clinic for this.  At this time, he is insisting on leaving.          Review of patient's allergies indicates:  No Known Allergies  Past Medical History:   Diagnosis Date    Acute on chronic pancreatitis 4/3/2016    DKA (diabetic ketoacidoses) 2020    DM (diabetes mellitus), type 1     HCV (hepatitis C virus)     high VL     HTN (hypertension)     IVDU (intravenous drug user)     Heroin    Pancreatitis      Past Surgical History:   Procedure Laterality Date    CARDIAC SURGERY      stent placed. ([facility])    ESOPHAGOGASTRODUODENOSCOPY N/A 3/5/2020    Gastritis.  No H pylori.  Completed PPI for 1 month.  Procedure: EGD (ESOPHAGOGASTRODUODENOSCOPY);  Surgeon: Brinda Rene MD;  Location: [facility];  Service: Endoscopy;  Laterality: N/A;  W421 A; x5445    SHOULDER SURGERY      right shoulder, spur removal.     Family History   Problem Relation Age of Onset    Asthma Mother      Social History     Tobacco Use    Smoking status: Former Smoker     Packs/day: 1.00     Years: 20.00     Pack years: 20.00     Types: Cigarettes     Start date: 4/3/1981     Quit date: 10/2019     Years since quittin.7    Smokeless tobacco: Never Used   Substance Use Topics     Alcohol use: No     Alcohol/week: 0.0 standard drinks    Drug use: Yes     Types: IV, Marijuana, Heroin     Comment: currently, used today, herion     Review of Systems   Constitutional: Negative for fever.   HENT: Negative for sore throat.    Respiratory: Negative for shortness of breath.    Cardiovascular: Negative for chest pain.   Gastrointestinal: Negative for nausea.   Genitourinary: Negative for dysuria.   Musculoskeletal: Negative for back pain.   Skin: Negative for rash.   Neurological: Negative for weakness.   Hematological: Does not bruise/bleed easily.       Physical Exam     Initial Vitals [07/11/20 1652]   BP Pulse Resp Temp SpO2   (!) 143/80 107 (!) 22 99.8 °F (37.7 °C) 97 %      MAP       --         Physical Exam    Nursing note and vitals reviewed.  Constitutional: He appears well-developed and well-nourished. He is not diaphoretic. No distress.   Patient is alert and conversational.   HENT:   Head: Normocephalic.   Right Ear: External ear normal.   Left Ear: External ear normal.   Nose: Nose normal.   Mouth/Throat: Oropharynx is clear and moist.   Eyes: Conjunctivae and EOM are normal. Pupils are equal, round, and reactive to light. Right eye exhibits no discharge. Left eye exhibits no discharge. No scleral icterus.   Neck: Normal range of motion. Neck supple. No JVD present.   Cardiovascular: Normal rate, regular rhythm, normal heart sounds and intact distal pulses. Exam reveals no gallop and no friction rub.    No murmur heard.  Pulmonary/Chest: Breath sounds normal. No stridor. No respiratory distress. He has no wheezes. He has no rhonchi. He has no rales. He exhibits no tenderness.   Abdominal: Soft. Bowel sounds are normal. He exhibits no distension and no mass. There is no abdominal tenderness. There is no rebound and no guarding.   Musculoskeletal: Normal range of motion. No tenderness or edema.   Neurological: He is alert and oriented to person, place, and time. He has normal strength. No  cranial nerve deficit or sensory deficit.   Skin: Skin is warm and dry. No rash noted. No erythema. No pallor.   Psychiatric: He has a normal mood and affect. His behavior is normal. Judgment and thought content normal.         ED Course   Procedures  Labs Reviewed - No data to display       Imaging Results    None          Medical Decision Making:   ED Management:    This is the emergent evaluation of a   54-year-old male presents emergency department after heroin overdose.  The patient denies any symptoms at this time.  He is alert after receiving naloxone from EMS.  Patient's states that he used IV heroin today.  This has happened to him before.  He denies any other drug or alcohol use today.  He denies any symptoms such as chest pain, shortness of breath, nausea, vomiting, weakness.       Patient does not want to stay here and I am unable to keep him against his will.  He is alert and able to make decisions at this time.  I have prescribed nasal naloxone for him.  I have instructed him on its use.  I have advised him to follow-up with methadone clinic.  He states he would like to do this.  Advised return for any new or worsening symptoms.                                 Clinical Impression:     Accidental opioid overdose        ED Disposition Condition    Discharge Stable        ED Prescriptions     Medication Sig Dispense Start Date End Date Auth. Provider    naloxone (NARCAN) 4 mg/actuation Spry 4mg by nasal route as needed for opioid overdose; may repeat every 2-3 minutes in alternating nostrils until medical help arrives. Call 911 1 each 7/11/2020  Chiki Wan Jr., MD        Follow-up Information     Follow up With Specialties Details Why Contact Info    Cullen Zimmer MD Internal Medicine Schedule an appointment as soon as possible for a visit in 1 week  [address]  [address]  [facility] [address]  [phone]                                       Chiki Wan Jr., MD  07/11/20 2521

## 2020-07-11 NOTE — DISCHARGE INSTRUCTIONS
Please try to seek treatment for your heroin addiction.  Please return if you develop any new or worsening symptoms, especially any shortness of breath or fever make sure the you have your Narcan with your with a family member or friend at all times.

## 2020-07-29 ENCOUNTER — HOSPITAL ENCOUNTER (EMERGENCY)
Facility: HOSPITAL | Age: 55
Discharge: HOME | End: 2020-07-30
Attending: EMERGENCY MEDICINE
Payer: MEDICARE

## 2020-07-29 DIAGNOSIS — R06.02 SOB (SHORTNESS OF BREATH): Primary | ICD-10-CM

## 2020-07-29 DIAGNOSIS — J86.9 EMPYEMA LUNG: ICD-10-CM

## 2020-07-29 DIAGNOSIS — R07.9 CHEST PAIN: ICD-10-CM

## 2020-07-29 DIAGNOSIS — J45.41 MODERATE PERSISTENT REACTIVE AIRWAY DISEASE WITH ACUTE EXACERBATION: ICD-10-CM

## 2020-07-29 LAB
ALBUMIN SERPL BCP-MCNC: 4 G/DL (ref 3.5–5.2)
ALP SERPL-CCNC: 80 U/L (ref 55–135)
ALT SERPL W/O P-5'-P-CCNC: 14 U/L (ref 10–44)
ANION GAP SERPL CALC-SCNC: 8 MMOL/L (ref 8–16)
AST SERPL-CCNC: 15 U/L (ref 10–40)
BASOPHILS # BLD AUTO: 0.02 K/UL (ref 0–0.2)
BASOPHILS NFR BLD: 0.3 % (ref 0–1.9)
BILIRUB SERPL-MCNC: 0.2 MG/DL (ref 0.1–1)
BNP SERPL-MCNC: 40 PG/ML (ref 0–99)
BUN SERPL-MCNC: 17 MG/DL (ref 6–20)
CALCIUM SERPL-MCNC: 9.2 MG/DL (ref 8.7–10.5)
CHLORIDE SERPL-SCNC: 100 MMOL/L (ref 95–110)
CO2 SERPL-SCNC: 29 MMOL/L (ref 23–29)
CREAT SERPL-MCNC: 1.4 MG/DL (ref 0.5–1.4)
CRP SERPL-MCNC: 1.4 MG/L (ref 0–8.2)
DIFFERENTIAL METHOD: ABNORMAL
EOSINOPHIL # BLD AUTO: 0.6 K/UL (ref 0–0.5)
EOSINOPHIL NFR BLD: 9.1 % (ref 0–8)
ERYTHROCYTE [DISTWIDTH] IN BLOOD BY AUTOMATED COUNT: 14.8 % (ref 11.5–14.5)
EST. GFR  (AFRICAN AMERICAN): >60 ML/MIN/1.73 M^2
EST. GFR  (NON AFRICAN AMERICAN): 57 ML/MIN/1.73 M^2
GLUCOSE SERPL-MCNC: 167 MG/DL (ref 70–110)
HCT VFR BLD AUTO: 38.3 % (ref 40–54)
HGB BLD-MCNC: 11.7 G/DL (ref 14–18)
IMM GRANULOCYTES # BLD AUTO: 0.02 K/UL (ref 0–0.04)
IMM GRANULOCYTES NFR BLD AUTO: 0.3 % (ref 0–0.5)
LYMPHOCYTES # BLD AUTO: 1.8 K/UL (ref 1–4.8)
LYMPHOCYTES NFR BLD: 29.4 % (ref 18–48)
MCH RBC QN AUTO: 26 PG (ref 27–31)
MCHC RBC AUTO-ENTMCNC: 30.5 G/DL (ref 32–36)
MCV RBC AUTO: 85 FL (ref 82–98)
MONOCYTES # BLD AUTO: 0.5 K/UL (ref 0.3–1)
MONOCYTES NFR BLD: 8.5 % (ref 4–15)
NEUTROPHILS # BLD AUTO: 3.2 K/UL (ref 1.8–7.7)
NEUTROPHILS NFR BLD: 52.4 % (ref 38–73)
NRBC BLD-RTO: 0 /100 WBC
PLATELET # BLD AUTO: 237 K/UL (ref 150–350)
PMV BLD AUTO: 10 FL (ref 9.2–12.9)
POTASSIUM SERPL-SCNC: 4 MMOL/L (ref 3.5–5.1)
PROCALCITONIN SERPL IA-MCNC: 0.04 NG/ML
PROT SERPL-MCNC: 8.7 G/DL (ref 6–8.4)
RBC # BLD AUTO: 4.5 M/UL (ref 4.6–6.2)
SARS-COV-2 RDRP RESP QL NAA+PROBE: NEGATIVE
SODIUM SERPL-SCNC: 137 MMOL/L (ref 136–145)
TROPONIN I SERPL DL<=0.01 NG/ML-MCNC: 0.03 NG/ML (ref 0–0.03)
WBC # BLD AUTO: 6.03 K/UL (ref 3.9–12.7)

## 2020-07-29 PROCEDURE — 84484 ASSAY OF TROPONIN QUANT: CPT

## 2020-07-29 PROCEDURE — 83880 ASSAY OF NATRIURETIC PEPTIDE: CPT

## 2020-07-29 PROCEDURE — 86140 C-REACTIVE PROTEIN: CPT

## 2020-07-29 PROCEDURE — 63600175 PHARM REV CODE 636 W HCPCS: Performed by: EMERGENCY MEDICINE

## 2020-07-29 PROCEDURE — U0002 COVID-19 LAB TEST NON-CDC: HCPCS

## 2020-07-29 PROCEDURE — 96374 THER/PROPH/DIAG INJ IV PUSH: CPT

## 2020-07-29 PROCEDURE — 25000242 PHARM REV CODE 250 ALT 637 W/ HCPCS: Performed by: EMERGENCY MEDICINE

## 2020-07-29 PROCEDURE — 93010 EKG 12-LEAD: ICD-10-PCS | Mod: ,,, | Performed by: INTERNAL MEDICINE

## 2020-07-29 PROCEDURE — 87040 BLOOD CULTURE FOR BACTERIA: CPT | Mod: 59

## 2020-07-29 PROCEDURE — 99285 EMERGENCY DEPT VISIT HI MDM: CPT | Mod: 25

## 2020-07-29 PROCEDURE — 93010 ELECTROCARDIOGRAM REPORT: CPT | Mod: ,,, | Performed by: INTERNAL MEDICINE

## 2020-07-29 PROCEDURE — 84145 PROCALCITONIN (PCT): CPT

## 2020-07-29 PROCEDURE — 85025 COMPLETE CBC W/AUTO DIFF WBC: CPT

## 2020-07-29 PROCEDURE — 80053 COMPREHEN METABOLIC PANEL: CPT

## 2020-07-29 PROCEDURE — 93005 ELECTROCARDIOGRAM TRACING: CPT

## 2020-07-29 RX ORDER — METHYLPREDNISOLONE SOD SUCC 125 MG
125 VIAL (EA) INJECTION
Status: COMPLETED | OUTPATIENT
Start: 2020-07-29 | End: 2020-07-29

## 2020-07-29 RX ORDER — ALBUTEROL SULFATE 90 UG/1
10 AEROSOL, METERED RESPIRATORY (INHALATION) ONCE
Status: COMPLETED | OUTPATIENT
Start: 2020-07-29 | End: 2020-07-29

## 2020-07-29 RX ORDER — PREDNISONE 50 MG/1
50 TABLET ORAL DAILY
Qty: 4 TABLET | Refills: 0 | Status: SHIPPED | OUTPATIENT
Start: 2020-07-29 | End: 2020-08-02

## 2020-07-29 RX ORDER — ALBUTEROL SULFATE 90 UG/1
1-2 AEROSOL, METERED RESPIRATORY (INHALATION) EVERY 6 HOURS PRN
Qty: 8 G | Refills: 0 | Status: SHIPPED | OUTPATIENT
Start: 2020-07-29 | End: 2021-12-07 | Stop reason: SDUPTHER

## 2020-07-29 RX ORDER — AZITHROMYCIN 250 MG/1
500 TABLET, FILM COATED ORAL DAILY
Qty: 6 TABLET | Refills: 0 | Status: SHIPPED | OUTPATIENT
Start: 2020-07-29 | End: 2020-08-02

## 2020-07-29 RX ADMIN — ALBUTEROL SULFATE 10 PUFF: 90 AEROSOL, METERED RESPIRATORY (INHALATION) at 09:07

## 2020-07-29 RX ADMIN — METHYLPREDNISOLONE SODIUM SUCCINATE 125 MG: 125 INJECTION, POWDER, FOR SOLUTION INTRAMUSCULAR; INTRAVENOUS at 09:07

## 2020-07-30 VITALS
BODY MASS INDEX: 21.98 KG/M2 | HEART RATE: 92 BPM | DIASTOLIC BLOOD PRESSURE: 59 MMHG | RESPIRATION RATE: 14 BRPM | HEIGHT: 68 IN | WEIGHT: 145 LBS | OXYGEN SATURATION: 94 % | SYSTOLIC BLOOD PRESSURE: 119 MMHG

## 2020-07-30 LAB — TROPONIN I SERPL DL<=0.01 NG/ML-MCNC: 0.02 NG/ML (ref 0–0.03)

## 2020-07-30 PROCEDURE — 84484 ASSAY OF TROPONIN QUANT: CPT

## 2020-07-30 NOTE — DISCHARGE INSTRUCTIONS
You were seen in the emergency department for difficulty breathing due to asthma. We gave you steroids and a breathing treatment and you felt better.  You've planned to go home and get another breathing treatment.  Please continue this every 4 hours while awake for the next day or so until you are completely back to normal.  We have given you a prescription for steroids and antibiotic.  Please take it as directed.  Please follow-up with your primary care provider.  Please return immediately for any new or worsening wheezing, difficulty breathing, dizziness, or you become concerned in any way.

## 2020-07-30 NOTE — ED PROVIDER NOTES
Encounter Date: 7/29/2020    SCRIBE #1 NOTE: I, Nael Borjas, am scribing for, and in the presence of,  Real Stoll MD. I have scribed the following portions of the note - Other sections scribed: HPI, ROS, PE, MDM.       History     Chief Complaint   Patient presents with    Shortness of Breath     Patient reports SOB x 3 episodes. patient reports having respiratory issues for a month. patient 92 % on room air. received 1 neb with ems patient oxygenw currently 97%     Cali Mabry is an 54 y.o male presenting to the ED via EMS complaining of three episodes of shortness of breath that started tonight. Patient reports symptoms of wheezes, chest pain, abdominal pain, and rhinorrhea. Patient denies fever, chills, or cough. EMS reports prior attempt at treatment via nebulizer en route to ED. Patient reports a past medical history of empyema. No drug based allergies. Patient smokes one pack per day of tobacco cigarettes per day. Patient drinks alcohol. Patient has a prior history of heroin use intravenously.    The history is provided by the patient and the EMS personnel.     Review of patient's allergies indicates:  No Known Allergies  Past Medical History:   Diagnosis Date    Acute on chronic pancreatitis 4/3/2016    DKA (diabetic ketoacidoses) 02/2020    DM (diabetes mellitus), type 1     HCV (hepatitis C virus)     high VL     HTN (hypertension)     IVDU (intravenous drug user)     Heroin    Pancreatitis      Past Surgical History:   Procedure Laterality Date    CARDIAC SURGERY  1999    stent placed. ([facility])    ESOPHAGOGASTRODUODENOSCOPY N/A 3/5/2020    Gastritis.  No H pylori.  Completed PPI for 1 month.  Procedure: EGD (ESOPHAGOGASTRODUODENOSCOPY);  Surgeon: Brinda Rene MD;  Location: [facility];  Service: Endoscopy;  Laterality: N/A;  W421 A; x5445    SHOULDER SURGERY  2013    right shoulder, spur removal.     Family History   Problem Relation Age of Onset    Asthma Mother       Social History     Tobacco Use    Smoking status: Former Smoker     Packs/day: 1.00     Years: 20.00     Pack years: 20.00     Types: Cigarettes     Start date: 4/3/1981     Quit date: 10/2019     Years since quittin.8    Smokeless tobacco: Never Used   Substance Use Topics    Alcohol use: No     Alcohol/week: 0.0 standard drinks    Drug use: Not Currently     Types: IV, Marijuana, Heroin     Comment: currently, used today, herion     Review of Systems   Constitutional: Negative for chills and fever.   HENT: Positive for rhinorrhea. Negative for sore throat.    Respiratory: Positive for shortness of breath and wheezing. Negative for cough.    Cardiovascular: Positive for chest pain.   Gastrointestinal: Positive for abdominal pain. Negative for nausea.   Genitourinary: Negative for dysuria.   Musculoskeletal: Negative for back pain.   Skin: Negative for rash.   Neurological: Negative for weakness.       Physical Exam     Initial Vitals [20 2105]   BP Pulse Resp Temp SpO2   (!) 180/90 (!) 116 (!) 22 -- 99 %      MAP       --         Physical Exam    Nursing note and vitals reviewed.  Constitutional: He appears well-developed and well-nourished. He is not diaphoretic. No distress.   HENT:   Head: Normocephalic and atraumatic.   Eyes: Conjunctivae and EOM are normal. Pupils are equal, round, and reactive to light. No scleral icterus.   Neck: Normal range of motion. Neck supple.   Cardiovascular: Normal rate, regular rhythm, normal heart sounds and intact distal pulses. Exam reveals no gallop and no friction rub.    No murmur heard.  Pulmonary/Chest: No respiratory distress. He has wheezes. He has no rhonchi. He has rales.   Bilateral expiratory wheezing and inspiratory crackles.   Abdominal: Soft. Bowel sounds are normal. He exhibits distension. There is no abdominal tenderness. There is no rebound and no guarding.   Mild abdominal distension. Non-tender. Soft to the touch.   Musculoskeletal: Normal  range of motion. No tenderness or edema.   Neurological: He is alert and oriented to person, place, and time. He has normal strength. No cranial nerve deficit. GCS score is 15. GCS eye subscore is 4. GCS verbal subscore is 5. GCS motor subscore is 6.   Skin: Skin is warm and dry. Capillary refill takes less than 2 seconds. No rash noted.   Psychiatric: He has a normal mood and affect. His behavior is normal.         ED Course   Procedures  Labs Reviewed   CBC W/ AUTO DIFFERENTIAL - Abnormal; Notable for the following components:       Result Value    RBC 4.50 (*)     Hemoglobin 11.7 (*)     Hematocrit 38.3 (*)     Mean Corpuscular Hemoglobin 26.0 (*)     Mean Corpuscular Hemoglobin Conc 30.5 (*)     RDW 14.8 (*)     Eos # 0.6 (*)     Eosinophil% 9.1 (*)     All other components within normal limits   COMPREHENSIVE METABOLIC PANEL - Abnormal; Notable for the following components:    Glucose 167 (*)     Total Protein 8.7 (*)     eGFR if non  57 (*)     All other components within normal limits   CULTURE, BLOOD   CULTURE, BLOOD   TROPONIN I   B-TYPE NATRIURETIC PEPTIDE   SARS-COV-2 RNA AMPLIFICATION, QUAL   C-REACTIVE PROTEIN   PROCALCITONIN   TROPONIN I     EKG Readings: (Independently Interpreted)   Rhythm: Sinus Tachycardia. Heart Rate: 103. Ectopy: No Ectopy. Conduction: Normal. ST Segments: Normal ST Segments. T Waves: Normal. Clinical Impression: Normal Sinus Rhythm   No ST segment elevation or depression concerning for acute ischemia.          Imaging Results          X-Ray Chest AP Portable (Final result)  Result time 07/29/20 22:02:03    Final result by Linh Gordon MD (07/29/20 22:02:03)                 Impression:      Small right pleural effusion.      Electronically signed by: Linh Gordon MD  Date:    07/29/2020  Time:    22:02             Narrative:    EXAMINATION:  XR CHEST AP PORTABLE    CLINICAL HISTORY:  Chest Pain;    TECHNIQUE:  Single frontal view of the chest was  performed.    COMPARISON:  05/25/2020.    FINDINGS:  Cardiac silhouette is normal in size.  Lungs are symmetrically expanded.  There is blunting of the right costophrenic angle with lateralization of the peak of the right hemidiaphragm which may reflect small right subpulmonic effusion.  No significant pleural effusion seen on the left.  No evidence of new focal consolidation.  No pneumothorax.  No acute osseous abnormality identified.                                 Medical Decision Making:   Initial Assessment:   54-year-old male with history of pancreatitis, cirrhosis, hep C, empyema, pneumonia, drug abuse presenting with acute onset shortness of breath, wheezing, cough.  Patient reports never feeling this short of breath previously.  No known history of COPD.  Had a prolonged extensive hospital course over the last few months starting last February due to empyema and suspected endocarditis.  Had underwent 6 weeks of IV vanc since then.  Patient reports he no longer uses IV drugs, though review of hospital records reveals he had a recent drug overdose a few weeks ago.  Denies fevers, chills.  Reports midsternal chest tightness.  Currently reports symptoms have resolved since getting breathing treatments by EMS.  Reports he is a daily smoker.  Reports symptoms occurred at rest, having constant, though improving at this time.  On exam he is speaking in full sentences, mild tachycardia, mildly elevated blood pressure.  Satting 100% on 2 L nasal cannula.  Patient has diffuse wheezing throughout, prolonged expiratory phase, as well as bilateral inspiratory crackles.  Differential includes COPD/reactive airway disease, CHF, less likely ACS.  Pneumonia and complications due to empyema not impossible.  We will get labs, blood cultures, EKG, x-ray and reassess.  Clinical Tests:   Lab Tests: Reviewed and Ordered  Radiological Study: Reviewed and Ordered  Medical Tests: Reviewed and Ordered  ED Management:  Patient with  substantial improvement after nebs.  Workup largely unremarkable with the exception of small pleural effusion, similar to prior.  Suspect reactive airway or COPD exacerbation.  During workup, the patient became irate and demanded to leave.  Patient got into a verbal altercation with the nurse.  He stated he did not want to stay for any further results treatment or workup.  The patient was discharged home at his request.  At the time of discharge, the patient was shouting in full sentences with improved vital signs and in no acute distress.            Scribe Attestation:   Scribe #1: I performed the above scribed service and the documentation accurately describes the services I performed. I attest to the accuracy of the note.                          Clinical Impression:     1. SOB (shortness of breath)    2. Chest pain    3. Moderate persistent reactive airway disease with acute exacerbation    4. Empyema lung            Disposition:   Disposition: Discharged  Condition: Stable     ED Disposition Condition    Discharge Stable        ED Prescriptions     Medication Sig Dispense Start Date End Date Auth. Provider    predniSONE (DELTASONE) 50 MG Tab Take 1 tablet (50 mg total) by mouth once daily. for 4 days 4 tablet 7/29/2020 8/2/2020 Real Stoll MD    azithromycin (ZITHROMAX Z-IRASEMA) 250 MG tablet Take 2 tablets (500 mg total) by mouth once daily. Take 2 tablets (500mg) on day one, then 250mg per day for four days for 4 days 6 tablet 7/29/2020 8/2/2020 Real Stoll MD    albuterol (PROVENTIL/VENTOLIN HFA) 90 mcg/actuation inhaler Inhale 1-2 puffs into the lungs every 6 (six) hours as needed for Wheezing or Shortness of Breath. Rescue 8 g 7/29/2020 8/28/2020 Real Stoll MD        Follow-up Information     Follow up With Specialties Details Why Contact Info    Cullen Zimmer MD Internal Medicine Schedule an appointment as soon as possible for a visit   [address]  [address]  [facility]  [address]  [phone]      [facility] Emergency Medicine  As needed, If symptoms worsen [address] [place] [address] [phone]                          I, Real Stoll, personally performed the services described in this documentation. All medical record entries made by the scribe were at my direction and in my presence. I have reviewed the chart and agree that the record reflects my personal performance and is accurate and complete.             Real Stoll MD  07/30/20 0146

## 2020-07-30 NOTE — ED PROVIDER NOTES
Encounter Date: 7/29/2020    SCRIBE #1 NOTE: I, Nael Borjas, am scribing for, and in the presence of,  Real Stoll MD. I have scribed the following portions of the note - Other sections scribed: HPI, ROS, PE, MDM.       History     Chief Complaint   Patient presents with    Shortness of Breath     Patient reports SOB x 3 episodes. patient reports having respiratory issues for a month. patient 92 % on room air. received 1 neb with ems patient oxygenw currently 97%     Cali Mabry is an 54 y.o male presenting to the ED via EMS complaining of three episodes of shortness of breath that started tonight. EMS reports patient has a respiratory illness that he has been treating for the past month and receives two breathing treatments per day. Patient reports symptoms of wheezes, chest pain, abdominal pain, and rhinorrhea. Patient denies fever, chills, or cough. EMS reports prior attempt at treatment via nebulizer en route to ED. Patient reports a past medical history of empyema. No drug based allergies. Patient smokes one pack per day of tobacco cigarettes per day. Patient drinks alcohol. Patient administers heroin intravenously.    The history is provided by the patient and the EMS personnel.     Review of patient's allergies indicates:  No Known Allergies  Past Medical History:   Diagnosis Date    Acute on chronic pancreatitis 4/3/2016    DKA (diabetic ketoacidoses) 02/2020    DM (diabetes mellitus), type 1     HCV (hepatitis C virus)     high VL     HTN (hypertension)     IVDU (intravenous drug user)     Heroin    Pancreatitis      Past Surgical History:   Procedure Laterality Date    CARDIAC SURGERY  1999    stent placed. ([facility])    ESOPHAGOGASTRODUODENOSCOPY N/A 3/5/2020    Gastritis.  No H pylori.  Completed PPI for 1 month.  Procedure: EGD (ESOPHAGOGASTRODUODENOSCOPY);  Surgeon: Brinda Rene MD;  Location: [facility];  Service: Endoscopy;  Laterality: N/A;  W421 A; x5445     SHOULDER SURGERY  2013    right shoulder, spur removal.     Family History   Problem Relation Age of Onset    Asthma Mother      Social History     Tobacco Use    Smoking status: Former Smoker     Packs/day: 1.00     Years: 20.00     Pack years: 20.00     Types: Cigarettes     Start date: 4/3/1981     Quit date: 10/2019     Years since quittin.8    Smokeless tobacco: Never Used   Substance Use Topics    Alcohol use: No     Alcohol/week: 0.0 standard drinks    Drug use: Yes     Types: IV, Marijuana, Heroin     Comment: currently, used today, herion     Review of Systems   Constitutional: Negative for chills and fever.   HENT: Positive for rhinorrhea. Negative for sore throat.    Respiratory: Positive for shortness of breath and wheezing. Negative for cough.    Cardiovascular: Positive for chest pain.   Gastrointestinal: Positive for abdominal pain. Negative for diarrhea and vomiting.       Physical Exam     Initial Vitals [20 2105]   BP Pulse Resp Temp SpO2   (!) 180/90 (!) 116 (!) 22 -- 99 %      MAP       --         Physical Exam    Nursing note and vitals reviewed.  Constitutional: He appears well-developed and well-nourished. He is not diaphoretic. No distress.   HENT:   Head: Normocephalic and atraumatic.   Eyes: Conjunctivae and EOM are normal. Pupils are equal, round, and reactive to light. No scleral icterus.   Neck: Normal range of motion. Neck supple.   Cardiovascular: Normal rate, regular rhythm, normal heart sounds and intact distal pulses. Exam reveals no gallop and no friction rub.    No murmur heard.  Pulmonary/Chest: No respiratory distress. He has wheezes. He has no rhonchi. He has rales.   Bilateral expiratory wheezing and inspiratory crackles.   Abdominal: Soft. Bowel sounds are normal. He exhibits distension. There is no abdominal tenderness. There is no rebound and no guarding.   Mild abdominal distension. Non-tender. Soft to touch.   Musculoskeletal: Normal range of motion. No  tenderness or edema.   Neurological: He is alert and oriented to person, place, and time. He has normal strength. No cranial nerve deficit.   Skin: Skin is warm and dry. Capillary refill takes less than 2 seconds. No rash noted.   Psychiatric: He has a normal mood and affect. His behavior is normal.         ED Course   Procedures  Labs Reviewed - No data to display       Imaging Results    None          Medical Decision Making:   Clinical Tests:   Lab Tests: Reviewed and Ordered  Radiological Study: Reviewed and Ordered  Medical Tests: Reviewed and Ordered            Scribe Attestation:   Scribe #1: I performed the above scribed service and the documentation accurately describes the services I performed. I attest to the accuracy of the note.                          Clinical Impression:     1. Chest pain                          I, ***, personally performed the services described in this documentation. All medical record entries made by the scribe were at my direction and in my presence. I have reviewed the chart and agree that the record reflects my personal performance and is accurate and complete.

## 2020-08-03 LAB — BACTERIA BLD CULT: NORMAL

## 2020-08-04 LAB — BACTERIA BLD CULT: NORMAL

## 2020-08-07 ENCOUNTER — HOSPITAL ENCOUNTER (OUTPATIENT)
Facility: HOSPITAL | Age: 55
LOS: 1 days | Discharge: HOME | End: 2020-08-08
Attending: EMERGENCY MEDICINE | Admitting: HOSPITALIST
Payer: MEDICARE

## 2020-08-07 DIAGNOSIS — J96.01 ACUTE RESPIRATORY FAILURE WITH HYPOXIA AND HYPERCARBIA: ICD-10-CM

## 2020-08-07 DIAGNOSIS — J44.1 COPD EXACERBATION: Primary | ICD-10-CM

## 2020-08-07 DIAGNOSIS — R09.02 HYPOXIA: ICD-10-CM

## 2020-08-07 DIAGNOSIS — R06.03 RESPIRATORY DISTRESS: ICD-10-CM

## 2020-08-07 DIAGNOSIS — J96.02 ACUTE RESPIRATORY FAILURE WITH HYPOXIA AND HYPERCARBIA: ICD-10-CM

## 2020-08-07 LAB
ALBUMIN SERPL BCP-MCNC: 3.9 G/DL (ref 3.5–5.2)
ALLENS TEST: ABNORMAL
ALP SERPL-CCNC: 82 U/L (ref 55–135)
ALT SERPL W/O P-5'-P-CCNC: 13 U/L (ref 10–44)
ANION GAP SERPL CALC-SCNC: 9 MMOL/L (ref 8–16)
APTT BLDCRRT: 27.6 SEC (ref 21–32)
AST SERPL-CCNC: 16 U/L (ref 10–40)
BASOPHILS # BLD AUTO: 0.02 K/UL (ref 0–0.2)
BASOPHILS NFR BLD: 0.3 % (ref 0–1.9)
BILIRUB SERPL-MCNC: 0.4 MG/DL (ref 0.1–1)
BNP SERPL-MCNC: 76 PG/ML (ref 0–99)
BUN SERPL-MCNC: 20 MG/DL (ref 6–20)
CALCIUM SERPL-MCNC: 8.8 MG/DL (ref 8.7–10.5)
CHLORIDE SERPL-SCNC: 100 MMOL/L (ref 95–110)
CK SERPL-CCNC: 25 U/L (ref 20–200)
CO2 SERPL-SCNC: 26 MMOL/L (ref 23–29)
CREAT SERPL-MCNC: 1.3 MG/DL (ref 0.5–1.4)
CRP SERPL-MCNC: 7 MG/L (ref 0–8.2)
D DIMER PPP IA.FEU-MCNC: 0.44 MG/L FEU
DELSYS: ABNORMAL
DIFFERENTIAL METHOD: ABNORMAL
EOSINOPHIL # BLD AUTO: 0.8 K/UL (ref 0–0.5)
EOSINOPHIL NFR BLD: 10.2 % (ref 0–8)
EP: 5
ERYTHROCYTE [DISTWIDTH] IN BLOOD BY AUTOMATED COUNT: 14.6 % (ref 11.5–14.5)
ERYTHROCYTE [SEDIMENTATION RATE] IN BLOOD BY WESTERGREN METHOD: 18 MM/H
EST. GFR  (AFRICAN AMERICAN): >60 ML/MIN/1.73 M^2
EST. GFR  (NON AFRICAN AMERICAN): >60 ML/MIN/1.73 M^2
FIO2: 100
GLUCOSE SERPL-MCNC: 118 MG/DL (ref 70–110)
HCO3 UR-SCNC: 27.5 MMOL/L (ref 24–28)
HCT VFR BLD AUTO: 39.7 % (ref 40–54)
HGB BLD-MCNC: 12.4 G/DL (ref 14–18)
IMM GRANULOCYTES # BLD AUTO: 0.04 K/UL (ref 0–0.04)
IMM GRANULOCYTES NFR BLD AUTO: 0.5 % (ref 0–0.5)
INR PPP: 0.9 (ref 0.8–1.2)
IP: 10
LACTATE SERPL-SCNC: 1.1 MMOL/L (ref 0.5–2.2)
LDH SERPL L TO P-CCNC: 174 U/L (ref 110–260)
LIPASE SERPL-CCNC: 42 U/L (ref 4–60)
LIPASE SERPL-CCNC: 42 U/L (ref 4–60)
LYMPHOCYTES # BLD AUTO: 1.7 K/UL (ref 1–4.8)
LYMPHOCYTES NFR BLD: 22.8 % (ref 18–48)
MAGNESIUM SERPL-MCNC: 2.1 MG/DL (ref 1.6–2.6)
MCH RBC QN AUTO: 26.1 PG (ref 27–31)
MCHC RBC AUTO-ENTMCNC: 31.2 G/DL (ref 32–36)
MCV RBC AUTO: 83 FL (ref 82–98)
MIN VOL: 13.7
MODE: ABNORMAL
MONOCYTES # BLD AUTO: 0.8 K/UL (ref 0.3–1)
MONOCYTES NFR BLD: 10.8 % (ref 4–15)
NEUTROPHILS # BLD AUTO: 4.1 K/UL (ref 1.8–7.7)
NEUTROPHILS NFR BLD: 55.4 % (ref 38–73)
NRBC BLD-RTO: 0 /100 WBC
PCO2 BLDA: 53.1 MMHG (ref 35–45)
PH SMN: 7.32 [PH] (ref 7.35–7.45)
PHOSPHATE SERPL-MCNC: 3.7 MG/DL (ref 2.7–4.5)
PLATELET # BLD AUTO: 253 K/UL (ref 150–350)
PMV BLD AUTO: 9.3 FL (ref 9.2–12.9)
PO2 BLDA: 69 MMHG (ref 80–100)
POC BE: 1 MMOL/L
POC SATURATED O2: 92 % (ref 95–100)
POC TCO2: 29 MMOL/L (ref 23–27)
POTASSIUM SERPL-SCNC: 4.3 MMOL/L (ref 3.5–5.1)
PROCALCITONIN SERPL IA-MCNC: 0.06 NG/ML
PROT SERPL-MCNC: 8.4 G/DL (ref 6–8.4)
PROTHROMBIN TIME: 10 SEC (ref 9–12.5)
RBC # BLD AUTO: 4.76 M/UL (ref 4.6–6.2)
SAMPLE: ABNORMAL
SARS-COV-2 RDRP RESP QL NAA+PROBE: NEGATIVE
SITE: ABNORMAL
SODIUM SERPL-SCNC: 135 MMOL/L (ref 136–145)
SP02: 100
SPONT RATE: 21
TROPONIN I SERPL DL<=0.01 NG/ML-MCNC: 0.01 NG/ML (ref 0–0.03)
WBC # BLD AUTO: 7.47 K/UL (ref 3.9–12.7)

## 2020-08-07 PROCEDURE — 87077 CULTURE AEROBIC IDENTIFY: CPT

## 2020-08-07 PROCEDURE — 94660 CPAP INITIATION&MGMT: CPT

## 2020-08-07 PROCEDURE — 93010 ELECTROCARDIOGRAM REPORT: CPT | Mod: ,,, | Performed by: INTERNAL MEDICINE

## 2020-08-07 PROCEDURE — 80053 COMPREHEN METABOLIC PANEL: CPT

## 2020-08-07 PROCEDURE — 99900035 HC TECH TIME PER 15 MIN (STAT)

## 2020-08-07 PROCEDURE — 87040 BLOOD CULTURE FOR BACTERIA: CPT

## 2020-08-07 PROCEDURE — 85730 THROMBOPLASTIN TIME PARTIAL: CPT

## 2020-08-07 PROCEDURE — 83880 ASSAY OF NATRIURETIC PEPTIDE: CPT

## 2020-08-07 PROCEDURE — 96372 THER/PROPH/DIAG INJ SC/IM: CPT | Mod: 59

## 2020-08-07 PROCEDURE — 99291 CRITICAL CARE FIRST HOUR: CPT | Mod: 25

## 2020-08-07 PROCEDURE — 84100 ASSAY OF PHOSPHORUS: CPT

## 2020-08-07 PROCEDURE — 85379 FIBRIN DEGRADATION QUANT: CPT

## 2020-08-07 PROCEDURE — 93010 EKG 12-LEAD: ICD-10-PCS | Mod: ,,, | Performed by: INTERNAL MEDICINE

## 2020-08-07 PROCEDURE — 84145 PROCALCITONIN (PCT): CPT

## 2020-08-07 PROCEDURE — 82728 ASSAY OF FERRITIN: CPT

## 2020-08-07 PROCEDURE — 87186 SC STD MICRODIL/AGAR DIL: CPT | Mod: 59

## 2020-08-07 PROCEDURE — 83605 ASSAY OF LACTIC ACID: CPT

## 2020-08-07 PROCEDURE — 96375 TX/PRO/DX INJ NEW DRUG ADDON: CPT

## 2020-08-07 PROCEDURE — 85610 PROTHROMBIN TIME: CPT

## 2020-08-07 PROCEDURE — 93005 ELECTROCARDIOGRAM TRACING: CPT

## 2020-08-07 PROCEDURE — 27000190 HC CPAP FULL FACE MASK W/VALVE

## 2020-08-07 PROCEDURE — 96365 THER/PROPH/DIAG IV INF INIT: CPT

## 2020-08-07 PROCEDURE — 85025 COMPLETE CBC W/AUTO DIFF WBC: CPT

## 2020-08-07 PROCEDURE — 25000242 PHARM REV CODE 250 ALT 637 W/ HCPCS: Performed by: EMERGENCY MEDICINE

## 2020-08-07 PROCEDURE — 99292 CRITICAL CARE ADDL 30 MIN: CPT

## 2020-08-07 PROCEDURE — 36410 VNPNXR 3YR/> PHY/QHP DX/THER: CPT

## 2020-08-07 PROCEDURE — 25000003 PHARM REV CODE 250: Performed by: EMERGENCY MEDICINE

## 2020-08-07 PROCEDURE — 83735 ASSAY OF MAGNESIUM: CPT

## 2020-08-07 PROCEDURE — 63600175 PHARM REV CODE 636 W HCPCS: Performed by: EMERGENCY MEDICINE

## 2020-08-07 PROCEDURE — 83615 LACTATE (LD) (LDH) ENZYME: CPT

## 2020-08-07 PROCEDURE — U0002 COVID-19 LAB TEST NON-CDC: HCPCS

## 2020-08-07 PROCEDURE — 82550 ASSAY OF CK (CPK): CPT

## 2020-08-07 PROCEDURE — 83690 ASSAY OF LIPASE: CPT

## 2020-08-07 PROCEDURE — 36600 WITHDRAWAL OF ARTERIAL BLOOD: CPT

## 2020-08-07 PROCEDURE — 84484 ASSAY OF TROPONIN QUANT: CPT

## 2020-08-07 PROCEDURE — 94640 AIRWAY INHALATION TREATMENT: CPT

## 2020-08-07 PROCEDURE — 86140 C-REACTIVE PROTEIN: CPT

## 2020-08-07 PROCEDURE — 82803 BLOOD GASES ANY COMBINATION: CPT

## 2020-08-07 RX ORDER — METHYLPREDNISOLONE SOD SUCC 125 MG
125 VIAL (EA) INJECTION
Status: COMPLETED | OUTPATIENT
Start: 2020-08-07 | End: 2020-08-07

## 2020-08-07 RX ORDER — IPRATROPIUM BROMIDE AND ALBUTEROL SULFATE 2.5; .5 MG/3ML; MG/3ML
3 SOLUTION RESPIRATORY (INHALATION)
Status: COMPLETED | OUTPATIENT
Start: 2020-08-07 | End: 2020-08-07

## 2020-08-07 RX ORDER — DEXAMETHASONE SODIUM PHOSPHATE 4 MG/ML
12 INJECTION, SOLUTION INTRA-ARTICULAR; INTRALESIONAL; INTRAMUSCULAR; INTRAVENOUS; SOFT TISSUE
Status: COMPLETED | OUTPATIENT
Start: 2020-08-07 | End: 2020-08-07

## 2020-08-07 RX ADMIN — DEXAMETHASONE SODIUM PHOSPHATE 12 MG: 4 INJECTION, SOLUTION INTRA-ARTICULAR; INTRALESIONAL; INTRAMUSCULAR; INTRAVENOUS; SOFT TISSUE at 09:08

## 2020-08-07 RX ADMIN — IPRATROPIUM BROMIDE AND ALBUTEROL SULFATE 3 ML: .5; 3 SOLUTION RESPIRATORY (INHALATION) at 10:08

## 2020-08-07 RX ADMIN — CEFTRIAXONE 1 G: 1 INJECTION, SOLUTION INTRAVENOUS at 09:08

## 2020-08-07 RX ADMIN — AZITHROMYCIN 500 MG: 500 INJECTION, POWDER, LYOPHILIZED, FOR SOLUTION INTRAVENOUS at 10:08

## 2020-08-07 RX ADMIN — METHYLPREDNISOLONE SODIUM SUCCINATE 125 MG: 125 INJECTION, POWDER, FOR SOLUTION INTRAMUSCULAR; INTRAVENOUS at 09:08

## 2020-08-08 VITALS
SYSTOLIC BLOOD PRESSURE: 118 MMHG | DIASTOLIC BLOOD PRESSURE: 59 MMHG | HEART RATE: 92 BPM | RESPIRATION RATE: 16 BRPM | WEIGHT: 147.25 LBS | TEMPERATURE: 99 F | OXYGEN SATURATION: 95 % | HEIGHT: 68 IN | BODY MASS INDEX: 22.32 KG/M2

## 2020-08-08 PROBLEM — J44.1 COPD EXACERBATION: Status: ACTIVE | Noted: 2020-08-08

## 2020-08-08 PROBLEM — F14.10 COCAINE ABUSE: Status: RESOLVED | Noted: 2018-08-05 | Resolved: 2020-08-08

## 2020-08-08 PROBLEM — J96.02 ACUTE RESPIRATORY FAILURE WITH HYPOXIA AND HYPERCARBIA: Status: ACTIVE | Noted: 2020-04-19

## 2020-08-08 LAB
ALBUMIN SERPL BCP-MCNC: 3.7 G/DL (ref 3.5–5.2)
ALLENS TEST: ABNORMAL
ALLENS TEST: ABNORMAL
ALP SERPL-CCNC: 82 U/L (ref 55–135)
ALT SERPL W/O P-5'-P-CCNC: 10 U/L (ref 10–44)
AMPHET+METHAMPHET UR QL: NEGATIVE
AMPHET+METHAMPHET UR QL: NEGATIVE
ANION GAP SERPL CALC-SCNC: 8 MMOL/L (ref 8–16)
AST SERPL-CCNC: 14 U/L (ref 10–40)
BACTERIA #/AREA URNS HPF: NORMAL /HPF
BARBITURATES UR QL SCN>200 NG/ML: NEGATIVE
BARBITURATES UR QL SCN>200 NG/ML: NEGATIVE
BASOPHILS # BLD AUTO: 0 K/UL (ref 0–0.2)
BASOPHILS NFR BLD: 0 % (ref 0–1.9)
BENZODIAZ UR QL SCN>200 NG/ML: NEGATIVE
BENZODIAZ UR QL SCN>200 NG/ML: NEGATIVE
BILIRUB SERPL-MCNC: 0.4 MG/DL (ref 0.1–1)
BILIRUB UR QL STRIP: NEGATIVE
BUN SERPL-MCNC: 23 MG/DL (ref 6–20)
BZE UR QL SCN: NEGATIVE
BZE UR QL SCN: NEGATIVE
CALCIUM SERPL-MCNC: 8.8 MG/DL (ref 8.7–10.5)
CANNABINOIDS UR QL SCN: NEGATIVE
CANNABINOIDS UR QL SCN: NEGATIVE
CHLORIDE SERPL-SCNC: 98 MMOL/L (ref 95–110)
CLARITY UR: CLEAR
CO2 SERPL-SCNC: 26 MMOL/L (ref 23–29)
COLOR UR: YELLOW
CREAT SERPL-MCNC: 1.2 MG/DL (ref 0.5–1.4)
CREAT UR-MCNC: 53.3 MG/DL (ref 23–375)
CREAT UR-MCNC: 54 MG/DL (ref 23–375)
CTP QC/QA: YES
DELSYS: ABNORMAL
DELSYS: ABNORMAL
DIFFERENTIAL METHOD: ABNORMAL
EOSINOPHIL # BLD AUTO: 0 K/UL (ref 0–0.5)
EOSINOPHIL NFR BLD: 0.7 % (ref 0–8)
EP: 7
ERYTHROCYTE [DISTWIDTH] IN BLOOD BY AUTOMATED COUNT: 14.6 % (ref 11.5–14.5)
ERYTHROCYTE [SEDIMENTATION RATE] IN BLOOD BY WESTERGREN METHOD: 18 MM/H
EST. GFR  (AFRICAN AMERICAN): >60 ML/MIN/1.73 M^2
EST. GFR  (NON AFRICAN AMERICAN): >60 ML/MIN/1.73 M^2
ETHANOL UR-MCNC: <10 MG/DL
FERRITIN SERPL-MCNC: 83 NG/ML (ref 20–300)
FIO2: 60
GLUCOSE SERPL-MCNC: 278 MG/DL (ref 70–110)
GLUCOSE SERPL-MCNC: 340 MG/DL (ref 70–110)
GLUCOSE UR QL STRIP: ABNORMAL
HCO3 UR-SCNC: 25.2 MMOL/L (ref 24–28)
HCO3 UR-SCNC: 27.2 MMOL/L (ref 24–28)
HCT VFR BLD AUTO: 37.9 % (ref 40–54)
HGB BLD-MCNC: 12 G/DL (ref 14–18)
HGB UR QL STRIP: NEGATIVE
HYALINE CASTS #/AREA URNS LPF: NORMAL /LPF
IMM GRANULOCYTES # BLD AUTO: 0.04 K/UL (ref 0–0.04)
IMM GRANULOCYTES NFR BLD AUTO: 0.9 % (ref 0–0.5)
IP: 12
KETONES UR QL STRIP: ABNORMAL
LEUKOCYTE ESTERASE UR QL STRIP: NEGATIVE
LYMPHOCYTES # BLD AUTO: 0.6 K/UL (ref 1–4.8)
LYMPHOCYTES NFR BLD: 13.1 % (ref 18–48)
MAGNESIUM SERPL-MCNC: 2.3 MG/DL (ref 1.6–2.6)
MCH RBC QN AUTO: 26.8 PG (ref 27–31)
MCHC RBC AUTO-ENTMCNC: 31.7 G/DL (ref 32–36)
MCV RBC AUTO: 85 FL (ref 82–98)
METHADONE UR QL SCN>300 NG/ML: NORMAL
METHADONE UR QL SCN>300 NG/ML: NORMAL
MICROSCOPIC COMMENT: NORMAL
MIN VOL: 10.8
MODE: ABNORMAL
MODE: ABNORMAL
MONOCYTES # BLD AUTO: 0.1 K/UL (ref 0.3–1)
MONOCYTES NFR BLD: 2.5 % (ref 4–15)
NEUTROPHILS # BLD AUTO: 3.6 K/UL (ref 1.8–7.7)
NEUTROPHILS NFR BLD: 82.8 % (ref 38–73)
NITRITE UR QL STRIP: NEGATIVE
NRBC BLD-RTO: 0 /100 WBC
OPIATES UR QL SCN: NEGATIVE
OPIATES UR QL SCN: NEGATIVE
PCO2 BLDA: 44.5 MMHG (ref 35–45)
PCO2 BLDA: 47.5 MMHG (ref 35–45)
PCP UR QL SCN>25 NG/ML: NEGATIVE
PCP UR QL SCN>25 NG/ML: NEGATIVE
PH SMN: 7.36 [PH] (ref 7.35–7.45)
PH SMN: 7.37 [PH] (ref 7.35–7.45)
PH UR STRIP: 5 [PH] (ref 5–8)
PHOSPHATE SERPL-MCNC: 3.4 MG/DL (ref 2.7–4.5)
PLATELET # BLD AUTO: 223 K/UL (ref 150–350)
PMV BLD AUTO: 9.4 FL (ref 9.2–12.9)
PO2 BLDA: 326 MMHG (ref 80–100)
PO2 BLDA: 45 MMHG (ref 40–60)
POC BE: 0 MMOL/L
POC BE: 1 MMOL/L
POC MOLECULAR INFLUENZA A AGN: NEGATIVE
POC MOLECULAR INFLUENZA B AGN: NEGATIVE
POC SATURATED O2: 100 % (ref 95–100)
POC SATURATED O2: 78 % (ref 95–100)
POC TCO2: 27 MMOL/L (ref 24–29)
POC TCO2: 29 MMOL/L (ref 23–27)
POCT GLUCOSE: 264 MG/DL (ref 70–110)
POCT GLUCOSE: 310 MG/DL (ref 70–110)
POCT GLUCOSE: 314 MG/DL (ref 70–110)
POCT GLUCOSE: 414 MG/DL (ref 70–110)
POCT GLUCOSE: 418 MG/DL (ref 70–110)
POCT GLUCOSE: 441 MG/DL (ref 70–110)
POCT GLUCOSE: >500 MG/DL (ref 70–110)
POTASSIUM SERPL-SCNC: 4.6 MMOL/L (ref 3.5–5.1)
PROT SERPL-MCNC: 8 G/DL (ref 6–8.4)
PROT UR QL STRIP: ABNORMAL
RBC # BLD AUTO: 4.48 M/UL (ref 4.6–6.2)
RBC #/AREA URNS HPF: 0 /HPF (ref 0–4)
SAMPLE: ABNORMAL
SAMPLE: ABNORMAL
SITE: ABNORMAL
SITE: ABNORMAL
SODIUM SERPL-SCNC: 132 MMOL/L (ref 136–145)
SP GR UR STRIP: 1.01 (ref 1–1.03)
SP02: 100
SQUAMOUS #/AREA URNS HPF: NORMAL /HPF
TOXICOLOGY INFORMATION: NORMAL
TOXICOLOGY INFORMATION: NORMAL
URN SPEC COLLECT METH UR: ABNORMAL
UROBILINOGEN UR STRIP-ACNC: NEGATIVE EU/DL
WBC # BLD AUTO: 4.36 K/UL (ref 3.9–12.7)
WBC #/AREA URNS HPF: 0 /HPF (ref 0–5)

## 2020-08-08 PROCEDURE — 25000003 PHARM REV CODE 250: Performed by: INTERNAL MEDICINE

## 2020-08-08 PROCEDURE — 94640 AIRWAY INHALATION TREATMENT: CPT

## 2020-08-08 PROCEDURE — C9399 UNCLASSIFIED DRUGS OR BIOLOG: HCPCS | Performed by: INTERNAL MEDICINE

## 2020-08-08 PROCEDURE — 83735 ASSAY OF MAGNESIUM: CPT

## 2020-08-08 PROCEDURE — 82803 BLOOD GASES ANY COMBINATION: CPT

## 2020-08-08 PROCEDURE — 81000 URINALYSIS NONAUTO W/SCOPE: CPT | Mod: 59

## 2020-08-08 PROCEDURE — 94761 N-INVAS EAR/PLS OXIMETRY MLT: CPT

## 2020-08-08 PROCEDURE — 36415 COLL VENOUS BLD VENIPUNCTURE: CPT

## 2020-08-08 PROCEDURE — 85025 COMPLETE CBC W/AUTO DIFF WBC: CPT

## 2020-08-08 PROCEDURE — 99900031 HC PATIENT EDUCATION (STAT)

## 2020-08-08 PROCEDURE — 99900035 HC TECH TIME PER 15 MIN (STAT)

## 2020-08-08 PROCEDURE — 25000242 PHARM REV CODE 250 ALT 637 W/ HCPCS: Performed by: INTERNAL MEDICINE

## 2020-08-08 PROCEDURE — 80307 DRUG TEST PRSMV CHEM ANLYZR: CPT

## 2020-08-08 PROCEDURE — 80053 COMPREHEN METABOLIC PANEL: CPT

## 2020-08-08 PROCEDURE — 63700000 PHARM REV CODE 250 ALT 637 W/O HCPCS: Performed by: INTERNAL MEDICINE

## 2020-08-08 PROCEDURE — 63600175 PHARM REV CODE 636 W HCPCS: Performed by: INTERNAL MEDICINE

## 2020-08-08 PROCEDURE — 36600 WITHDRAWAL OF ARTERIAL BLOOD: CPT

## 2020-08-08 PROCEDURE — 96372 THER/PROPH/DIAG INJ SC/IM: CPT

## 2020-08-08 PROCEDURE — 84100 ASSAY OF PHOSPHORUS: CPT

## 2020-08-08 PROCEDURE — 25000003 PHARM REV CODE 250: Performed by: HOSPITALIST

## 2020-08-08 PROCEDURE — G0378 HOSPITAL OBSERVATION PER HR: HCPCS

## 2020-08-08 RX ORDER — PREDNISONE 20 MG/1
20 TABLET ORAL DAILY
Qty: 3 TABLET | Refills: 0 | Status: ON HOLD | OUTPATIENT
Start: 2020-08-09 | End: 2020-08-14 | Stop reason: HOSPADM

## 2020-08-08 RX ORDER — INSULIN ASPART 100 [IU]/ML
15 INJECTION, SOLUTION INTRAVENOUS; SUBCUTANEOUS
Status: DISCONTINUED | OUTPATIENT
Start: 2020-08-08 | End: 2020-08-08 | Stop reason: HOSPADM

## 2020-08-08 RX ORDER — INSULIN ASPART 100 [IU]/ML
0-5 INJECTION, SOLUTION INTRAVENOUS; SUBCUTANEOUS
Status: DISCONTINUED | OUTPATIENT
Start: 2020-08-08 | End: 2020-08-08 | Stop reason: HOSPADM

## 2020-08-08 RX ORDER — IBUPROFEN 200 MG
16 TABLET ORAL
Status: DISCONTINUED | OUTPATIENT
Start: 2020-08-08 | End: 2020-08-08 | Stop reason: HOSPADM

## 2020-08-08 RX ORDER — SODIUM CHLORIDE 9 MG/ML
1000 INJECTION, SOLUTION INTRAVENOUS CONTINUOUS
Status: DISCONTINUED | OUTPATIENT
Start: 2020-08-08 | End: 2020-08-08

## 2020-08-08 RX ORDER — FOLIC ACID 1 MG/1
1 TABLET ORAL DAILY
Status: DISCONTINUED | OUTPATIENT
Start: 2020-08-08 | End: 2020-08-08 | Stop reason: HOSPADM

## 2020-08-08 RX ORDER — PREDNISONE 20 MG/1
20 TABLET ORAL DAILY
Status: DISCONTINUED | OUTPATIENT
Start: 2020-08-09 | End: 2020-08-08 | Stop reason: HOSPADM

## 2020-08-08 RX ORDER — QUETIAPINE FUMARATE 25 MG/1
50 TABLET, FILM COATED ORAL NIGHTLY
Status: DISCONTINUED | OUTPATIENT
Start: 2020-08-08 | End: 2020-08-08

## 2020-08-08 RX ORDER — IBUPROFEN 200 MG
24 TABLET ORAL
Status: DISCONTINUED | OUTPATIENT
Start: 2020-08-08 | End: 2020-08-08 | Stop reason: HOSPADM

## 2020-08-08 RX ORDER — ACETAMINOPHEN 325 MG/1
650 TABLET ORAL EVERY 4 HOURS PRN
Status: DISCONTINUED | OUTPATIENT
Start: 2020-08-08 | End: 2020-08-08 | Stop reason: HOSPADM

## 2020-08-08 RX ORDER — DEXAMETHASONE SODIUM PHOSPHATE 4 MG/ML
8 INJECTION, SOLUTION INTRA-ARTICULAR; INTRALESIONAL; INTRAMUSCULAR; INTRAVENOUS; SOFT TISSUE EVERY 6 HOURS
Status: DISCONTINUED | OUTPATIENT
Start: 2020-08-08 | End: 2020-08-08

## 2020-08-08 RX ORDER — TALC
6 POWDER (GRAM) TOPICAL NIGHTLY PRN
Status: DISCONTINUED | OUTPATIENT
Start: 2020-08-08 | End: 2020-08-08 | Stop reason: HOSPADM

## 2020-08-08 RX ORDER — PREDNISONE 20 MG/1
40 TABLET ORAL DAILY
Status: DISCONTINUED | OUTPATIENT
Start: 2020-08-09 | End: 2020-08-08

## 2020-08-08 RX ORDER — AMOXICILLIN 250 MG
1 CAPSULE ORAL 2 TIMES DAILY
Status: DISCONTINUED | OUTPATIENT
Start: 2020-08-08 | End: 2020-08-08 | Stop reason: HOSPADM

## 2020-08-08 RX ORDER — METHYLPREDNISOLONE SOD SUCC 125 MG
125 VIAL (EA) INJECTION ONCE
Status: DISCONTINUED | OUTPATIENT
Start: 2020-08-09 | End: 2020-08-08

## 2020-08-08 RX ORDER — IPRATROPIUM BROMIDE AND ALBUTEROL SULFATE 2.5; .5 MG/3ML; MG/3ML
3 SOLUTION RESPIRATORY (INHALATION) EVERY 4 HOURS PRN
Qty: 1 BOX | Refills: 0 | Status: SHIPPED | OUTPATIENT
Start: 2020-08-08 | End: 2020-08-29 | Stop reason: SDUPTHER

## 2020-08-08 RX ORDER — DULOXETIN HYDROCHLORIDE 30 MG/1
60 CAPSULE, DELAYED RELEASE ORAL DAILY
Status: DISCONTINUED | OUTPATIENT
Start: 2020-08-08 | End: 2020-08-08 | Stop reason: HOSPADM

## 2020-08-08 RX ORDER — ATORVASTATIN CALCIUM 10 MG/1
20 TABLET, FILM COATED ORAL DAILY
Status: DISCONTINUED | OUTPATIENT
Start: 2020-08-08 | End: 2020-08-08 | Stop reason: HOSPADM

## 2020-08-08 RX ORDER — INSULIN ASPART 100 [IU]/ML
7 INJECTION, SOLUTION INTRAVENOUS; SUBCUTANEOUS
Status: DISCONTINUED | OUTPATIENT
Start: 2020-08-08 | End: 2020-08-08

## 2020-08-08 RX ORDER — ENOXAPARIN SODIUM 100 MG/ML
40 INJECTION SUBCUTANEOUS EVERY 24 HOURS
Status: DISCONTINUED | OUTPATIENT
Start: 2020-08-08 | End: 2020-08-08 | Stop reason: HOSPADM

## 2020-08-08 RX ORDER — AZITHROMYCIN 250 MG/1
250 TABLET, FILM COATED ORAL DAILY
Qty: 3 TABLET | Refills: 0 | Status: ON HOLD | OUTPATIENT
Start: 2020-08-09 | End: 2020-08-14

## 2020-08-08 RX ORDER — IPRATROPIUM BROMIDE AND ALBUTEROL SULFATE 2.5; .5 MG/3ML; MG/3ML
3 SOLUTION RESPIRATORY (INHALATION) EVERY 6 HOURS PRN
Status: DISCONTINUED | OUTPATIENT
Start: 2020-08-08 | End: 2020-08-08 | Stop reason: SDUPTHER

## 2020-08-08 RX ORDER — POLYETHYLENE GLYCOL 3350 17 G/17G
17 POWDER, FOR SOLUTION ORAL 2 TIMES DAILY PRN
Status: DISCONTINUED | OUTPATIENT
Start: 2020-08-08 | End: 2020-08-08 | Stop reason: HOSPADM

## 2020-08-08 RX ORDER — ONDANSETRON 2 MG/ML
4 INJECTION INTRAMUSCULAR; INTRAVENOUS EVERY 8 HOURS PRN
Status: DISCONTINUED | OUTPATIENT
Start: 2020-08-08 | End: 2020-08-08 | Stop reason: HOSPADM

## 2020-08-08 RX ORDER — IPRATROPIUM BROMIDE AND ALBUTEROL SULFATE 2.5; .5 MG/3ML; MG/3ML
3 SOLUTION RESPIRATORY (INHALATION) EVERY 4 HOURS
Status: DISCONTINUED | OUTPATIENT
Start: 2020-08-08 | End: 2020-08-08 | Stop reason: HOSPADM

## 2020-08-08 RX ORDER — INSULIN ASPART 100 [IU]/ML
10 INJECTION, SOLUTION INTRAVENOUS; SUBCUTANEOUS ONCE
Status: COMPLETED | OUTPATIENT
Start: 2020-08-08 | End: 2020-08-08

## 2020-08-08 RX ORDER — IPRATROPIUM BROMIDE AND ALBUTEROL SULFATE 2.5; .5 MG/3ML; MG/3ML
3 SOLUTION RESPIRATORY (INHALATION) EVERY 4 HOURS PRN
Status: DISCONTINUED | OUTPATIENT
Start: 2020-08-08 | End: 2020-08-08 | Stop reason: HOSPADM

## 2020-08-08 RX ORDER — OXYCODONE HYDROCHLORIDE 5 MG/1
5 TABLET ORAL EVERY 6 HOURS PRN
Status: DISCONTINUED | OUTPATIENT
Start: 2020-08-08 | End: 2020-08-08 | Stop reason: HOSPADM

## 2020-08-08 RX ORDER — ASCORBIC ACID 500 MG
500 TABLET ORAL 2 TIMES DAILY
Status: DISCONTINUED | OUTPATIENT
Start: 2020-08-08 | End: 2020-08-08 | Stop reason: HOSPADM

## 2020-08-08 RX ORDER — AMOXICILLIN AND CLAVULANATE POTASSIUM 875; 125 MG/1; MG/1
1 TABLET, FILM COATED ORAL EVERY 12 HOURS
Status: DISCONTINUED | OUTPATIENT
Start: 2020-08-08 | End: 2020-08-08 | Stop reason: HOSPADM

## 2020-08-08 RX ORDER — GLUCAGON 1 MG
1 KIT INJECTION
Status: DISCONTINUED | OUTPATIENT
Start: 2020-08-08 | End: 2020-08-08 | Stop reason: HOSPADM

## 2020-08-08 RX ORDER — GUAIFENESIN/DEXTROMETHORPHAN 100-10MG/5
5 SYRUP ORAL EVERY 4 HOURS PRN
Status: DISCONTINUED | OUTPATIENT
Start: 2020-08-08 | End: 2020-08-08 | Stop reason: HOSPADM

## 2020-08-08 RX ORDER — GABAPENTIN 300 MG/1
600 CAPSULE ORAL 3 TIMES DAILY
Status: DISCONTINUED | OUTPATIENT
Start: 2020-08-08 | End: 2020-08-08 | Stop reason: HOSPADM

## 2020-08-08 RX ORDER — ALPRAZOLAM 0.5 MG/1
0.5 TABLET ORAL 2 TIMES DAILY PRN
Status: DISCONTINUED | OUTPATIENT
Start: 2020-08-08 | End: 2020-08-08

## 2020-08-08 RX ORDER — QUETIAPINE FUMARATE 25 MG/1
50 TABLET, FILM COATED ORAL NIGHTLY
Status: DISCONTINUED | OUTPATIENT
Start: 2020-08-09 | End: 2020-08-08 | Stop reason: HOSPADM

## 2020-08-08 RX ORDER — SODIUM CHLORIDE 0.9 % (FLUSH) 0.9 %
10 SYRINGE (ML) INJECTION
Status: DISCONTINUED | OUTPATIENT
Start: 2020-08-08 | End: 2020-08-08 | Stop reason: HOSPADM

## 2020-08-08 RX ORDER — BENZONATATE 100 MG/1
100 CAPSULE ORAL 3 TIMES DAILY PRN
Status: DISCONTINUED | OUTPATIENT
Start: 2020-08-08 | End: 2020-08-08 | Stop reason: HOSPADM

## 2020-08-08 RX ORDER — AZITHROMYCIN 250 MG/1
250 TABLET, FILM COATED ORAL DAILY
Status: DISCONTINUED | OUTPATIENT
Start: 2020-08-08 | End: 2020-08-08 | Stop reason: HOSPADM

## 2020-08-08 RX ORDER — FOLIC ACID 0.4 MG
800 TABLET ORAL DAILY
Status: DISCONTINUED | OUTPATIENT
Start: 2020-08-08 | End: 2020-08-08 | Stop reason: CLARIF

## 2020-08-08 RX ADMIN — AZITHROMYCIN MONOHYDRATE 250 MG: 250 TABLET ORAL at 08:08

## 2020-08-08 RX ADMIN — INSULIN ASPART 5 UNITS: 100 INJECTION, SOLUTION INTRAVENOUS; SUBCUTANEOUS at 08:08

## 2020-08-08 RX ADMIN — DULOXETINE HYDROCHLORIDE 60 MG: 30 CAPSULE, DELAYED RELEASE ORAL at 08:08

## 2020-08-08 RX ADMIN — GABAPENTIN 600 MG: 300 CAPSULE ORAL at 03:08

## 2020-08-08 RX ADMIN — GABAPENTIN 600 MG: 300 CAPSULE ORAL at 08:08

## 2020-08-08 RX ADMIN — ATORVASTATIN CALCIUM 20 MG: 10 TABLET, FILM COATED ORAL at 08:08

## 2020-08-08 RX ADMIN — INSULIN ASPART 15 UNITS: 100 INJECTION, SOLUTION INTRAVENOUS; SUBCUTANEOUS at 11:08

## 2020-08-08 RX ADMIN — OXYCODONE HYDROCHLORIDE AND ACETAMINOPHEN 500 MG: 500 TABLET ORAL at 08:08

## 2020-08-08 RX ADMIN — OXYCODONE 5 MG: 5 TABLET ORAL at 06:08

## 2020-08-08 RX ADMIN — ALPRAZOLAM 0.5 MG: 0.5 TABLET ORAL at 04:08

## 2020-08-08 RX ADMIN — FOLIC ACID 1 MG: 1 TABLET ORAL at 08:08

## 2020-08-08 RX ADMIN — INSULIN ASPART 7 UNITS: 100 INJECTION, SOLUTION INTRAVENOUS; SUBCUTANEOUS at 07:08

## 2020-08-08 RX ADMIN — SODIUM CHLORIDE 1000 ML: 0.9 INJECTION, SOLUTION INTRAVENOUS at 04:08

## 2020-08-08 RX ADMIN — IPRATROPIUM BROMIDE AND ALBUTEROL SULFATE 3 ML: .5; 3 SOLUTION RESPIRATORY (INHALATION) at 02:08

## 2020-08-08 RX ADMIN — AMOXICILLIN AND CLAVULANATE POTASSIUM 1 TABLET: 875; 125 TABLET, FILM COATED ORAL at 08:08

## 2020-08-08 RX ADMIN — DOCUSATE SODIUM 50 MG AND SENNOSIDES 8.6 MG 1 TABLET: 8.6; 5 TABLET, FILM COATED ORAL at 08:08

## 2020-08-08 RX ADMIN — THERA TABS 1 TABLET: TAB at 08:08

## 2020-08-08 RX ADMIN — INSULIN ASPART 10 UNITS: 100 INJECTION, SOLUTION INTRAVENOUS; SUBCUTANEOUS at 03:08

## 2020-08-08 RX ADMIN — INSULIN DETEMIR 15 UNITS: 100 INJECTION, SOLUTION SUBCUTANEOUS at 04:08

## 2020-08-08 RX ADMIN — IPRATROPIUM BROMIDE AND ALBUTEROL SULFATE 3 ML: .5; 3 SOLUTION RESPIRATORY (INHALATION) at 05:08

## 2020-08-08 RX ADMIN — IPRATROPIUM BROMIDE AND ALBUTEROL SULFATE 3 ML: .5; 3 SOLUTION RESPIRATORY (INHALATION) at 11:08

## 2020-08-08 RX ADMIN — IPRATROPIUM BROMIDE AND ALBUTEROL SULFATE 3 ML: .5; 3 SOLUTION RESPIRATORY (INHALATION) at 08:08

## 2020-08-08 NOTE — H&P
"[facility] Medicine  History & Physical    Patient Name: Cali Mabry  MRN: [id number]  Admission Date: 8/7/2020  Attending Physician: Karissa Lugo MD   Primary Care Provider: Cullen Zimmer MD         Patient information was obtained from patient, past medical records and ER records.     Subjective:     Principal Problem:COPD exacerbation    Chief Complaint:   Chief Complaint   Patient presents with    Shortness of Breath     from home, hx of COPD, RA sat 78% with EMS, placed on CPAP, given duoneb with additional albuterol, placed on CPAP in route up to 99%        HPI: Mr. Mabry is a 55yo man with a past medical history of HTN, IV heroin abuse, DM1 with DKA, and COPD due to long term cigarette smoking.  He tells me that he stopped smoking 3 months ago.  When asked what happened today, he stated, "I'm not really sure.  You know I'm a little foggy-headed.  I know I was at home and suddenly it just hit me like a ton of bricks and I got short of breath and it kicked me on my ass."  He lives with his mother, so when she got home he borrowed her albuterol nebulizer a few times, which, "seems like it didn't do nothing at all," per Mr. Mabry.      He states around this time he became confused and can't remember who called 911, though he does remember gasping for air and feeling he could not breathe.  He did note wheezing and some dry cough, but no fever or sputum.  He also denies karissa CP, but did have some pressure.   He remains on BiPAP during my interview, and though he has slow mentation, he is alert and conversant.    The RN who took triage report from EMS stated, "Pt presents to ED via EMS from home with c/o SOB. Per EMS pt has hx of COPD and  RA sat 78% upon their arrival. + wheezing. + smoker. Pt given duoneb with additional albuterol and  placed on CPAP  While in route to ED with EMS. Pt was up to 99% upon ED arrival on CPAP."    Past Medical History:   Diagnosis " Date    Acute on chronic pancreatitis 4/3/2016    DKA (diabetic ketoacidoses) 02/2020    DM (diabetes mellitus), type 1     HCV (hepatitis C virus)     high VL     HTN (hypertension)     IVDU (intravenous drug user)     Heroin    Pancreatitis        Past Surgical History:   Procedure Laterality Date    CARDIAC SURGERY  1999    stent placed. ([facility])    ESOPHAGOGASTRODUODENOSCOPY N/A 3/5/2020    Gastritis.  No H pylori.  Completed PPI for 1 month.  Procedure: EGD (ESOPHAGOGASTRODUODENOSCOPY);  Surgeon: Brinda Rene MD;  Location: [facility];  Service: Endoscopy;  Laterality: N/A;  [id number]; [phone]    SHOULDER SURGERY  2013    right shoulder, spur removal.       Review of patient's allergies indicates:  No Known Allergies    No current facility-administered medications on file prior to encounter.      Current Outpatient Medications on File Prior to Encounter   Medication Sig    acetaminophen (TYLENOL) 650 MG TbSR Take 650 mg by mouth every 8 (eight) hours as needed.    albuterol (PROVENTIL/VENTOLIN HFA) 90 mcg/actuation inhaler Inhale 1-2 puffs into the lungs every 6 (six) hours as needed for Wheezing or Shortness of Breath. Rescue    albuterol-ipratropium (DUO-NEB) 2.5 mg-0.5 mg/3 mL nebulizer solution Take 3 mLs by nebulization every 4 (four) hours as needed for Wheezing. Rescue    ALPRAZolam (XANAX) 0.5 MG tablet Take 0.5 mg by mouth 2 (two) times daily as needed for Anxiety.    ASCORBIC ACID, VITAMIN C, ORAL Take 1 tablet by mouth once daily.    aspirin (ECOTRIN) 325 MG EC tablet Take 1 tablet (325 mg total) by mouth once daily.    atorvastatin (LIPITOR) 20 MG tablet Take 20 mg by mouth once daily.    b complex vitamins tablet Take 1 tablet by mouth once daily.    baclofen (LIORESAL) 10 MG tablet Take 10 mg by mouth 3 (three) times daily.    bisacodyL (DULCOLAX) 10 mg Supp Place 10 mg rectally daily as needed.    blood sugar diagnostic Strp Use To check blood glucose three times  daily,    blood-glucose meter Misc To check Blood glucose three times daily,    calcium carbonate (TUMS) 200 mg calcium (500 mg) chewable tablet Take 1 tablet (500 mg total) by mouth daily as needed for Heartburn.    diphenhydrAMINE (BENADRYL) 25 mg capsule Take 25 mg by mouth every 6 (six) hours as needed for Itching.    divalproex (DEPAKOTE) 125 MG EC tablet Take 125 mg by mouth every 8 (eight) hours.    docusate sodium (COLACE) 100 MG capsule Take 1 capsule (100 mg total) by mouth 2 (two) times daily as needed for Constipation.    DULoxetine (CYMBALTA) 60 MG capsule Take 1 capsule (60 mg total) by mouth once daily.    folic acid (FOLVITE) 800 MCG Tab Take 800 mcg by mouth once daily.    folic acid-vit B6-vit B12 2.5-25-2 mg (FOLBIC OR EQUIV) 2.5-25-2 mg Tab Take 1 tablet by mouth once daily.    gabapentin (NEURONTIN) 300 MG capsule Take 2 capsules (600 mg total) by mouth 3 (three) times daily.    glucagon, human recombinant, (GLUCAGEN HYPOKIT) 1 mg SolR Inject 1 mg into the muscle as needed.    glucose 4 GM chewable tablet Take 16 g by mouth as needed for Low blood sugar.    HALOPERIDOL LACTATE INJ Inject 1 mg as directed every 4 (four) hours as needed.    hydroxyzine HCL (ATARAX) 25 MG tablet Take 1 tablet (25 mg total) by mouth 3 (three) times daily as needed for Anxiety.    insulin aspart U-100 (NOVOLOG) 100 unit/mL injection Inject 1-10 Units into the skin 3 (three) times daily with meals.    insulin detemir U-100 (LEVEMIR) 100 unit/mL injection Inject 25 Units into the skin 2 (two) times a day.    insulin lispro 100 unit/mL injection Inject 10 Units into the skin 3 (three) times daily before meals.    L. acidophilus/Strept/[place] p-[place] (RISAQUAD ORAL) Take 1 capsule by mouth once daily.    lancets Misc To check BG 3 times daily, to use with insurance preferred meter    LANTUS U-100 INSULIN 100 unit/mL injection Inject 22 Units into the skin 2 (two) times daily.    lidocaine (LIDODERM) 5 %  Place 1 patch onto the skin once daily. Remove & Discard patch within 12 hours or as directed by MD.  Apply to shoulder for pain    lidocaine (LIDODERM) 5 % Place 1 patch onto the skin daily as needed. Remove & Discard patch within 12 hours or as directed by MD.  Apply to back for pain    melatonin (MELATIN) 3 mg tablet Take 2 tablets (6 mg total) by mouth nightly as needed for Insomnia.    mineral oil (FLEET OIL RETENTION) enema Place 1 enema rectally daily as needed for Constipation.    morphine sulfate (MORPHINE INJ) Inject 1 mg as directed every 8 (eight) hours as needed.    multivitamin (THERAGRAN) per tablet Take 1 tablet by mouth once daily.    naloxone (NARCAN) 4 mg/actuation Spry 4mg by nasal route as needed for opioid overdose; may repeat every 2-3 minutes in alternating nostrils until medical help arrives. Call 911    ondansetron (ZOFRAN) 2 mg/mL injection Inject 4 mg into the vein every 6 (six) hours as needed.    ondansetron (ZOFRAN-ODT) 8 MG TbDL Take 1 tablet (8 mg total) by mouth every 8 (eight) hours as needed.    oxyCODONE (ROXICODONE) 10 mg Tab immediate release tablet Take 10 mg by mouth every 6 (six) hours as needed for Pain.    oxyCODONE-acetaminophen (PERCOCET) 5-325 mg per tablet Take 1 tablet by mouth every 4 (four) hours as needed for Pain.    polyethylene glycol (GLYCOLAX) 17 gram PwPk Take 17 g by mouth daily as needed (Constipation). (Patient taking differently: Take 17 g by mouth 3 (three) times daily as needed (Constipation). )    QUEtiapine (SEROQUEL) 50 MG tablet Take 50 mg by mouth every evening.    traZODone (DESYREL) 50 MG tablet Take 25 mg by mouth every evening.     Family History     Problem Relation (Age of Onset)    Asthma Mother        Tobacco Use    Smoking status: Former Smoker     Packs/day: 1.00     Years: 20.00     Pack years: 20.00     Types: Cigarettes     Start date: 4/3/1981     Quit date: 10/2019     Years since quittin.8    Smokeless tobacco:  Never Used   Substance and Sexual Activity    Alcohol use: No     Alcohol/week: 0.0 standard drinks    Drug use: Not Currently     Types: IV, Marijuana, Heroin     Comment: currently, used today, herion    Sexual activity: Yes     Partners: Female     Review of Systems   Constitutional: Positive for activity change, appetite change, diaphoresis and fatigue. Negative for chills and fever.   HENT: Negative for congestion.    Eyes: Negative for redness.   Respiratory: Positive for cough, chest tightness, shortness of breath and wheezing.    Cardiovascular: Negative for chest pain.   Gastrointestinal: Negative for abdominal pain, constipation, diarrhea, nausea and vomiting.   Endocrine: Positive for heat intolerance.   Genitourinary: Negative for dysuria.   Musculoskeletal: Negative for myalgias.   Skin: Negative for rash.   Allergic/Immunologic: Negative for immunocompromised state.   Neurological: Negative for dizziness and weakness.   Hematological: Does not bruise/bleed easily.   Psychiatric/Behavioral: Positive for confusion and sleep disturbance. The patient is nervous/anxious.      Objective:     Vital Signs (Most Recent):  Temp: 97.7 °F (36.5 °C) (08/07/20 2349)  Pulse: 94 (08/07/20 2328)  Resp: (!) 22 (08/07/20 2328)  BP: 136/71 (08/07/20 2317)  SpO2: 100 % (08/07/20 2328) Vital Signs (24h Range):  Temp:  [97.7 °F (36.5 °C)] 97.7 °F (36.5 °C)  Pulse:  [] 94  Resp:  [18-34] 22  SpO2:  [99 %-100 %] 100 %  BP: (114-174)/(71-84) 136/71     Weight: 65.8 kg (145 lb)  Body mass index is 22.05 kg/m².    Physical Exam  Vitals signs and nursing note reviewed.   Constitutional:       General: He is awake. He is not in acute distress.     Appearance: He is well-developed and underweight. He is ill-appearing. He is not toxic-appearing or diaphoretic.      Interventions: Face mask in place.      Comments: On BiPAP   HENT:      Head: Normocephalic and atraumatic.      Nose: Nose normal.      Mouth/Throat:       Pharynx: No oropharyngeal exudate.   Eyes:      General: No scleral icterus.        Right eye: No discharge.         Left eye: No discharge.      Conjunctiva/sclera:      Right eye: Right conjunctiva is injected.      Left eye: Left conjunctiva is injected.      Pupils: Pupils are equal, round, and reactive to light.   Neck:      Musculoskeletal: Normal range of motion and neck supple.      Thyroid: No thyromegaly.      Vascular: No JVD.      Trachea: No tracheal deviation.   Cardiovascular:      Rate and Rhythm: Normal rate and regular rhythm.      Heart sounds: Normal heart sounds. No murmur. No friction rub. No gallop.    Pulmonary:      Effort: Accessory muscle usage, prolonged expiration and respiratory distress present.      Breath sounds: No stridor. Wheezing present. No decreased breath sounds or rales.      Comments: Profuse wheezing, exp>insp with squeeks  Chest:      Chest wall: No tenderness.   Abdominal:      General: Bowel sounds are normal. There is no distension.      Palpations: Abdomen is soft. There is no mass.      Tenderness: There is no abdominal tenderness. There is no guarding or rebound.   Genitourinary:     Comments: No pena in place  Musculoskeletal: Normal range of motion.         General: No tenderness.   Lymphadenopathy:      Cervical: No cervical adenopathy.      Comments: No peripheral edema   Skin:     General: Skin is warm and dry.      Coloration: Skin is not pale.      Findings: No erythema or rash.   Neurological:      Mental Status: He is oriented to person, place, and time and easily aroused. He is lethargic.      GCS: GCS eye subscore is 4. GCS verbal subscore is 5. GCS motor subscore is 6.      Cranial Nerves: No cranial nerve deficit.      Motor: No abnormal muscle tone.      Coordination: Coordination normal.      Deep Tendon Reflexes: Reflexes normal.   Psychiatric:         Mood and Affect: Mood is anxious and depressed.         Speech: Speech is delayed.          Behavior: Behavior is slowed.         Cognition and Memory: Cognition is impaired. Memory is impaired. He exhibits impaired recent memory and impaired remote memory.           CRANIAL NERVES     CN III, IV, VI   Pupils are equal, round, and reactive to light.       Significant Labs:   Recent Results (from the past 24 hour(s))   CBC auto differential    Collection Time: 08/07/20  9:15 PM   Result Value Ref Range    WBC 7.47 3.90 - 12.70 K/uL    RBC 4.76 4.60 - 6.20 M/uL    Hemoglobin 12.4 (L) 14.0 - 18.0 g/dL    Hematocrit 39.7 (L) 40.0 - 54.0 %    Mean Corpuscular Volume 83 82 - 98 fL    Mean Corpuscular Hemoglobin 26.1 (L) 27.0 - 31.0 pg    Mean Corpuscular Hemoglobin Conc 31.2 (L) 32.0 - 36.0 g/dL    RDW 14.6 (H) 11.5 - 14.5 %    Platelets 253 150 - 350 K/uL    MPV 9.3 9.2 - 12.9 fL    Immature Granulocytes 0.5 0.0 - 0.5 %    Gran # (ANC) 4.1 1.8 - 7.7 K/uL    Immature Grans (Abs) 0.04 0.00 - 0.04 K/uL    Lymph # 1.7 1.0 - 4.8 K/uL    Mono # 0.8 0.3 - 1.0 K/uL    Eos # 0.8 (H) 0.0 - 0.5 K/uL    Baso # 0.02 0.00 - 0.20 K/uL    nRBC 0 0 /100 WBC    Gran% 55.4 38.0 - 73.0 %    Lymph% 22.8 18.0 - 48.0 %    Mono% 10.8 4.0 - 15.0 %    Eosinophil% 10.2 (H) 0.0 - 8.0 %    Basophil% 0.3 0.0 - 1.9 %    Differential Method Automated    Lactate Dehydrogenase    Collection Time: 08/07/20  9:15 PM   Result Value Ref Range     110 - 260 U/L   Lactic Acid, Plasma    Collection Time: 08/07/20  9:15 PM   Result Value Ref Range    Lactate (Lactic Acid) 1.1 0.5 - 2.2 mmol/L   Procalcitonin    Collection Time: 08/07/20  9:15 PM   Result Value Ref Range    Procalcitonin 0.06 <0.25 ng/mL   Brain Natriuretic Peptide    Collection Time: 08/07/20  9:15 PM   Result Value Ref Range    BNP 76 0 - 99 pg/mL   Comprehensive metabolic panel    Collection Time: 08/07/20  9:17 PM   Result Value Ref Range    Sodium 135 (L) 136 - 145 mmol/L    Potassium 4.3 3.5 - 5.1 mmol/L    Chloride 100 95 - 110 mmol/L    CO2 26 23 - 29 mmol/L    Glucose 118  (H) 70 - 110 mg/dL    BUN, Bld 20 6 - 20 mg/dL    Creatinine 1.3 0.5 - 1.4 mg/dL    Calcium 8.8 8.7 - 10.5 mg/dL    Total Protein 8.4 6.0 - 8.4 g/dL    Albumin 3.9 3.5 - 5.2 g/dL    Total Bilirubin 0.4 0.1 - 1.0 mg/dL    Alkaline Phosphatase 82 55 - 135 U/L    AST 16 10 - 40 U/L    ALT 13 10 - 44 U/L    Anion Gap 9 8 - 16 mmol/L    eGFR if African American >60 >60 mL/min/1.73 m^2    eGFR if non African American >60 >60 mL/min/1.73 m^2   C-Reactive Protein    Collection Time: 08/07/20  9:17 PM   Result Value Ref Range    CRP 7.0 0.0 - 8.2 mg/L   CK    Collection Time: 08/07/20  9:17 PM   Result Value Ref Range    CPK 25 20 - 200 U/L   Troponin I    Collection Time: 08/07/20  9:17 PM   Result Value Ref Range    Troponin I 0.012 0.000 - 0.026 ng/mL   Magnesium    Collection Time: 08/07/20  9:17 PM   Result Value Ref Range    Magnesium 2.1 1.6 - 2.6 mg/dL   Phosphorus    Collection Time: 08/07/20  9:17 PM   Result Value Ref Range    Phosphorus 3.7 2.7 - 4.5 mg/dL   APTT    Collection Time: 08/07/20  9:17 PM   Result Value Ref Range    aPTT 27.6 21.0 - 32.0 sec   Protime-INR    Collection Time: 08/07/20  9:17 PM   Result Value Ref Range    Prothrombin Time 10.0 9.0 - 12.5 sec    INR 0.9 0.8 - 1.2   Lipase    Collection Time: 08/07/20  9:17 PM   Result Value Ref Range    Lipase 42 4 - 60 U/L   Lipase    Collection Time: 08/07/20  9:17 PM   Result Value Ref Range    Lipase 42 4 - 60 U/L   D dimer, quantitative    Collection Time: 08/07/20  9:17 PM   Result Value Ref Range    D-Dimer 0.44 <0.50 mg/L FEU   COVID-19 Rapid Screening    Collection Time: 08/07/20  9:22 PM   Result Value Ref Range    SARS-CoV-2 RNA, Amplification, Qual Negative Negative   ISTAT PROCEDURE    Collection Time: 08/07/20 10:12 PM   Result Value Ref Range    POC PH 7.322 (L) 7.35 - 7.45    POC PCO2 53.1 (H) 35 - 45 mmHg    POC PO2 69 (L) 80 - 100 mmHg    POC HCO3 27.5 24 - 28 mmol/L    POC BE 1 -2 to 2 mmol/L    POC SATURATED O2 92 (L) 95 - 100 %     POC TCO2 29 (H) 23 - 27 mmol/L    Rate 18     Sample ARTERIAL     Site RR     Allens Test Pass     DelSys CPAP/BiPAP     Mode BiPAP     FiO2 100     Spont Rate 21     Min Vol 13.7     Sp02 100     IP 10     EP 5    POCT Influenza A/B Molecular    Collection Time: 08/08/20 12:08 AM   Result Value Ref Range    POC Molecular Influenza A Ag Negative Negative, Not Reported    POC Molecular Influenza B Ag Negative Negative, Not Reported     Acceptable Yes        Significant Imaging:   XR CHEST AP PORTABLE   FINDINGS:  Monitoring EKG leads are present.  The trachea is unremarkable.  There are calcifications of the aortic knob.  There is unchanged appearance of a trace right-sided pleural effusion.  There is no pleural effusion on the left.  There is no evidence of a pneumothorax.  There is no evidence of pneumomediastinum.  There are minimal residual airspace opacities in the lung bases.  The osseous structures are unremarkable.     Impression:   Stable examination.      Electronically signed by: New Falcon MD  Date:                                            08/07/2020  Time:                                           21:35    07-AUG-2020 21:20:19 EKG    Sinus tachycardia  Vent. rate 115 BPM  CA interval 138 ms  QRS duration 86 ms  QT/QTc 342/473 ms  Right atrial enlargement  Borderline Abnormal ECG  When compared with ECG of 29-JUL-2020 21:28,  No significant change was found    Assessment/Plan:     * COPD exacerbation  Duonebs Q4 hours scheduled and Q4 hours prn  He was given Dexamethasone 12mg and Solumedrol 125mg in the ED  I'm giving one more dose of Solumedrol 125mg iv x 1 at 3am   -Hopefully can transition to just Prednisone 40mg po qam by morning  He was empirically given Rocephin 1g and Zithro 500mg IV in the ED   -Start Augmentin 875mg po bid x 5 days   -Start Zithro 250mg po qday x 4 days  PRN robitussin DM and Tessalon Sanjay    Acute respiratory failure with hypoxia and hypercarbia  He  arrived on BiPAP with 100% FIO2 pH 7.32, PCO2 53, PAO2 69  This improved with time to FIO2 60% pH 7.365, PCO2 47.5, PAO2 326  He is now on 28% FIO2 with 12/7 and more alert and asking to have the BiPAP removed  I will repeat ABG with this and hopefully be able to remove the BiPAP  His D-dimer was negative with profuse wheezing, so don't have high suspicion for PTE as cause     Type 1 diabetes mellitus with hyperglycemia  Reduced home dose of insulin:  Lantus 22U SQ BID   -Replaced with 15U BID in hospital  Insulin Lispro 10U SQ TID   -Replaced with ASP 7U SQ TID  Start SSI protocol, low dose TID with meals and HS  May need to escalate back to home dose with steroids for COPD          Essential hypertension  He is on no medications for this at home  BP trending low here at times: 107/58  If trends up, consider ACE-I with the DM or maybe Norvasc    Chronic hepatitis C  Follow up with Hepatology as out patient      Tobacco use disorder, moderate, dependence  Patient states that he has not been smoking (quit 3 months ago)  I'm unsure if this is true      Polysubstance abuse  He has abused opioids, heroin and cocaine in the past  Check UDS now  Avoid BB in face of COPD and cocaine use      Anemia of chronic disease  Checked B12, folate, Fe studies      Hyperlipidemia          VTE Risk Mitigation (From admission, onward)         Ordered     enoxaparin injection 40 mg  Every 24 hours      08/08/20 0039     Place DAWIT hose  Until discontinued      08/08/20 0038     Place sequential compression device  Until discontinued      08/08/20 0038                   JOSE ANTONIO Mendieta MD  Department of Hospital Medicine   [facility]

## 2020-08-08 NOTE — ASSESSMENT & PLAN NOTE
He has abused opioids, heroin and cocaine in the past  Check UDS now  Avoid BB in face of COPD and cocaine use

## 2020-08-08 NOTE — NURSING
Patient arrived to floor via stretcher. Patient oriented to floor and room at this time. Basic setup placed at bedside. Vital signs are stable. Patient has no complaints at this time. Will continue to monitor.

## 2020-08-08 NOTE — ASSESSMENT & PLAN NOTE
He is on no medications for this at home  BP trending low here at times: 107/58  If trends up, consider ACE-I with the DM or maybe Norvasc

## 2020-08-08 NOTE — HPI
"Mr. Mabry is a 55yo man with a past medical history of HTN, IV heroin abuse, DM1 with DKA, and COPD due to long term cigarette smoking.  He tells me that he stopped smoking 3 months ago.  When asked what happened today, he stated, "I'm not really sure.  You know I'm a little foggy-headed.  I know I was at home and suddenly it just hit me like a ton of bricks and I got short of breath and it kicked me on my ass."  He lives with his mother, so when she got home he borrowed her albuterol nebulizer a few times, which, "seems like it didn't do nothing at all," per Mr. Mabry.      He states around this time he became confused and can't remember who called 911, though he does remember gasping for air and feeling he could not breathe.  He did note wheezing and some dry cough, but no fever or sputum.  He also denies karissa CP, but did have some pressure.   He remains on BiPAP during my interview, and though he has slow mentation, he is alert and conversant.    The RN who took triage report from EMS stated, "Pt presents to ED via EMS from home with c/o SOB. Per EMS pt has hx of COPD and  RA sat 78% upon their arrival. + wheezing. + smoker. Pt given duoneb with additional albuterol and  placed on CPAP  While in route to ED with EMS. Pt was up to 99% upon ED arrival on CPAP."  "

## 2020-08-08 NOTE — PLAN OF CARE
Pt nebulizer was delivered to room and pt ready for discharge for a case management standpoint.     08/08/20 1523   Final Note   Assessment Type Final Discharge Note   Anticipated Discharge Disposition Home   What phone number can be called within the next 1-3 days to see how you are doing after discharge? [phone]   Hospital Follow Up  Appt(s) scheduled? Yes   Discharge plans and expectations educations in teach back method with documentation complete? Yes   Right Care Referral Info   Post Acute Recommendation No Care

## 2020-08-08 NOTE — ASSESSMENT & PLAN NOTE
He arrived on BiPAP with 100% FIO2 pH 7.32, PCO2 53, PAO2 69  This improved with time to FIO2 60% pH 7.365, PCO2 47.5, PAO2 326  He is now on 28% FIO2 with 12/7 and more alert and asking to have the BiPAP removed  I will repeat ABG with this and hopefully be able to remove the BiPAP  His D-dimer was negative with profuse wheezing, so don't have high suspicion for PTE as cause

## 2020-08-08 NOTE — ASSESSMENT & PLAN NOTE
Reduced home dose of insulin:  Lantus 22U SQ BID   -Replaced with 15U BID in hospital  Insulin Lispro 10U SQ TID   -Replaced with ASP 7U SQ TID  Start SSI protocol, low dose TID with meals and HS  May need to escalate back to home dose with steroids for COPD

## 2020-08-08 NOTE — HOSPITAL COURSE
"Mr Mabry presented with acute respiratory failure with hypoxia. Placed on NIPPV, high dose IV methylprednisolone and bronchodilator treatment. Required admission. Patient quickly improved. Taken off NIPPV and stayed very stable at room air. With residual expiratory wheezing but asymptomatic. No consolidation on chest radiography but emphysematous changes are very notable on radiograph. Procalcitonin, D-dimer, BNP and troponin all negative. Agree with continuing azithromycin however given anti-inflammatory effects and severity of acute condition. High doses of methylprednisolone caused significantly increased blood glucose. Insulin regimen adjusted for better control however patient is known poorly controlled diabetic at home (per patient, glucose "usually high"). Toxicology screen positive for methadone when he has not been prescribed any controlled substances since March of this year (a bit more than 5 months from now).  Patient discharged to take 4 more days of azithromycin 250 mg daily and a lower dose of prednisone at 20 mg daily.  Discussed side effect of hyperglycemia with prednisone.  Patient verbalized understanding.  Prescribed nebulizer machine and treatments to be used as needed.  He states he already takes maintenance bronchodilators and has rescue albuterol inhaler as well.  Discharge in stable condition and able to ambulate independently in no distress.  Follow-up with primary care physician.  Regular diet.  Activity as tolerated.  "

## 2020-08-08 NOTE — SUBJECTIVE & OBJECTIVE
Past Medical History:   Diagnosis Date    Acute on chronic pancreatitis 4/3/2016    DKA (diabetic ketoacidoses) 02/2020    DM (diabetes mellitus), type 1     HCV (hepatitis C virus)     high VL     HTN (hypertension)     IVDU (intravenous drug user)     Heroin    Pancreatitis        Past Surgical History:   Procedure Laterality Date    CARDIAC SURGERY  1999    stent placed. ([facility])    ESOPHAGOGASTRODUODENOSCOPY N/A 3/5/2020    Gastritis.  No H pylori.  Completed PPI for 1 month.  Procedure: EGD (ESOPHAGOGASTRODUODENOSCOPY);  Surgeon: Brinda Rene MD;  Location: [facility];  Service: Endoscopy;  Laterality: N/A;  [id number] A; [phone]    SHOULDER SURGERY  2013    right shoulder, spur removal.       Review of patient's allergies indicates:  No Known Allergies    No current facility-administered medications on file prior to encounter.      Current Outpatient Medications on File Prior to Encounter   Medication Sig    acetaminophen (TYLENOL) 650 MG TbSR Take 650 mg by mouth every 8 (eight) hours as needed.    albuterol (PROVENTIL/VENTOLIN HFA) 90 mcg/actuation inhaler Inhale 1-2 puffs into the lungs every 6 (six) hours as needed for Wheezing or Shortness of Breath. Rescue    albuterol-ipratropium (DUO-NEB) 2.5 mg-0.5 mg/3 mL nebulizer solution Take 3 mLs by nebulization every 4 (four) hours as needed for Wheezing. Rescue    ALPRAZolam (XANAX) 0.5 MG tablet Take 0.5 mg by mouth 2 (two) times daily as needed for Anxiety.    ASCORBIC ACID, VITAMIN C, ORAL Take 1 tablet by mouth once daily.    aspirin (ECOTRIN) 325 MG EC tablet Take 1 tablet (325 mg total) by mouth once daily.    atorvastatin (LIPITOR) 20 MG tablet Take 20 mg by mouth once daily.    b complex vitamins tablet Take 1 tablet by mouth once daily.    baclofen (LIORESAL) 10 MG tablet Take 10 mg by mouth 3 (three) times daily.    bisacodyL (DULCOLAX) 10 mg Supp Place 10 mg rectally daily as needed.    blood sugar diagnostic Strp Use To  check blood glucose three times daily,    blood-glucose meter Misc To check Blood glucose three times daily,    calcium carbonate (TUMS) 200 mg calcium (500 mg) chewable tablet Take 1 tablet (500 mg total) by mouth daily as needed for Heartburn.    diphenhydrAMINE (BENADRYL) 25 mg capsule Take 25 mg by mouth every 6 (six) hours as needed for Itching.    divalproex (DEPAKOTE) 125 MG EC tablet Take 125 mg by mouth every 8 (eight) hours.    docusate sodium (COLACE) 100 MG capsule Take 1 capsule (100 mg total) by mouth 2 (two) times daily as needed for Constipation.    DULoxetine (CYMBALTA) 60 MG capsule Take 1 capsule (60 mg total) by mouth once daily.    folic acid (FOLVITE) 800 MCG Tab Take 800 mcg by mouth once daily.    folic acid-vit B6-vit B12 2.5-25-2 mg (FOLBIC OR EQUIV) 2.5-25-2 mg Tab Take 1 tablet by mouth once daily.    gabapentin (NEURONTIN) 300 MG capsule Take 2 capsules (600 mg total) by mouth 3 (three) times daily.    glucagon, human recombinant, (GLUCAGEN HYPOKIT) 1 mg SolR Inject 1 mg into the muscle as needed.    glucose 4 GM chewable tablet Take 16 g by mouth as needed for Low blood sugar.    HALOPERIDOL LACTATE INJ Inject 1 mg as directed every 4 (four) hours as needed.    hydroxyzine HCL (ATARAX) 25 MG tablet Take 1 tablet (25 mg total) by mouth 3 (three) times daily as needed for Anxiety.    insulin aspart U-100 (NOVOLOG) 100 unit/mL injection Inject 1-10 Units into the skin 3 (three) times daily with meals.    insulin detemir U-100 (LEVEMIR) 100 unit/mL injection Inject 25 Units into the skin 2 (two) times a day.    insulin lispro 100 unit/mL injection Inject 10 Units into the skin 3 (three) times daily before meals.    L. acidophilus/Strept/La p-whitney (RISAQUAD ORAL) Take 1 capsule by mouth once daily.    lancets Misc To check BG 3 times daily, to use with insurance preferred meter    LANTUS U-100 INSULIN 100 unit/mL injection Inject 22 Units into the skin 2 (two) times daily.     lidocaine (LIDODERM) 5 % Place 1 patch onto the skin once daily. Remove & Discard patch within 12 hours or as directed by MD.  Apply to shoulder for pain    lidocaine (LIDODERM) 5 % Place 1 patch onto the skin daily as needed. Remove & Discard patch within 12 hours or as directed by MD.  Apply to back for pain    melatonin (MELATIN) 3 mg tablet Take 2 tablets (6 mg total) by mouth nightly as needed for Insomnia.    mineral oil (FLEET OIL RETENTION) enema Place 1 enema rectally daily as needed for Constipation.    morphine sulfate (MORPHINE INJ) Inject 1 mg as directed every 8 (eight) hours as needed.    multivitamin (THERAGRAN) per tablet Take 1 tablet by mouth once daily.    naloxone (NARCAN) 4 mg/actuation Spry 4mg by nasal route as needed for opioid overdose; may repeat every 2-3 minutes in alternating nostrils until medical help arrives. Call 911    ondansetron (ZOFRAN) 2 mg/mL injection Inject 4 mg into the vein every 6 (six) hours as needed.    ondansetron (ZOFRAN-ODT) 8 MG TbDL Take 1 tablet (8 mg total) by mouth every 8 (eight) hours as needed.    oxyCODONE (ROXICODONE) 10 mg Tab immediate release tablet Take 10 mg by mouth every 6 (six) hours as needed for Pain.    oxyCODONE-acetaminophen (PERCOCET) 5-325 mg per tablet Take 1 tablet by mouth every 4 (four) hours as needed for Pain.    polyethylene glycol (GLYCOLAX) 17 gram PwPk Take 17 g by mouth daily as needed (Constipation). (Patient taking differently: Take 17 g by mouth 3 (three) times daily as needed (Constipation). )    QUEtiapine (SEROQUEL) 50 MG tablet Take 50 mg by mouth every evening.    traZODone (DESYREL) 50 MG tablet Take 25 mg by mouth every evening.     Family History     Problem Relation (Age of Onset)    Asthma Mother        Tobacco Use    Smoking status: Former Smoker     Packs/day: 1.00     Years: 20.00     Pack years: 20.00     Types: Cigarettes     Start date: 4/3/1981     Quit date: 10/2019     Years since quitting:  0.8    Smokeless tobacco: Never Used   Substance and Sexual Activity    Alcohol use: No     Alcohol/week: 0.0 standard drinks    Drug use: Not Currently     Types: IV, Marijuana, Heroin     Comment: currently, used today, herion    Sexual activity: Yes     Partners: Female     Review of Systems   Constitutional: Positive for activity change, appetite change, diaphoresis and fatigue. Negative for chills and fever.   HENT: Negative for congestion.    Eyes: Negative for redness.   Respiratory: Positive for cough, chest tightness, shortness of breath and wheezing.    Cardiovascular: Negative for chest pain.   Gastrointestinal: Negative for abdominal pain, constipation, diarrhea, nausea and vomiting.   Endocrine: Positive for heat intolerance.   Genitourinary: Negative for dysuria.   Musculoskeletal: Negative for myalgias.   Skin: Negative for rash.   Allergic/Immunologic: Negative for immunocompromised state.   Neurological: Negative for dizziness and weakness.   Hematological: Does not bruise/bleed easily.   Psychiatric/Behavioral: Positive for confusion and sleep disturbance. The patient is nervous/anxious.      Objective:     Vital Signs (Most Recent):  Temp: 97.7 °F (36.5 °C) (08/07/20 2349)  Pulse: 94 (08/07/20 2328)  Resp: (!) 22 (08/07/20 2328)  BP: 136/71 (08/07/20 2317)  SpO2: 100 % (08/07/20 2328) Vital Signs (24h Range):  Temp:  [97.7 °F (36.5 °C)] 97.7 °F (36.5 °C)  Pulse:  [] 94  Resp:  [18-34] 22  SpO2:  [99 %-100 %] 100 %  BP: (114-174)/(71-84) 136/71     Weight: 65.8 kg (145 lb)  Body mass index is 22.05 kg/m².    Physical Exam  Vitals signs and nursing note reviewed.   Constitutional:       General: He is awake. He is not in acute distress.     Appearance: He is well-developed and underweight. He is ill-appearing. He is not toxic-appearing or diaphoretic.      Interventions: Face mask in place.      Comments: On BiPAP   HENT:      Head: Normocephalic and atraumatic.      Nose: Nose normal.       Mouth/Throat:      Pharynx: No oropharyngeal exudate.   Eyes:      General: No scleral icterus.        Right eye: No discharge.         Left eye: No discharge.      Conjunctiva/sclera:      Right eye: Right conjunctiva is injected.      Left eye: Left conjunctiva is injected.      Pupils: Pupils are equal, round, and reactive to light.   Neck:      Musculoskeletal: Normal range of motion and neck supple.      Thyroid: No thyromegaly.      Vascular: No JVD.      Trachea: No tracheal deviation.   Cardiovascular:      Rate and Rhythm: Normal rate and regular rhythm.      Heart sounds: Normal heart sounds. No murmur. No friction rub. No gallop.    Pulmonary:      Effort: Accessory muscle usage, prolonged expiration and respiratory distress present.      Breath sounds: No stridor. Wheezing present. No decreased breath sounds or rales.      Comments: Profuse wheezing, exp>insp with squeeks  Chest:      Chest wall: No tenderness.   Abdominal:      General: Bowel sounds are normal. There is no distension.      Palpations: Abdomen is soft. There is no mass.      Tenderness: There is no abdominal tenderness. There is no guarding or rebound.   Genitourinary:     Comments: No pena in place  Musculoskeletal: Normal range of motion.         General: No tenderness.   Lymphadenopathy:      Cervical: No cervical adenopathy.      Comments: No peripheral edema   Skin:     General: Skin is warm and dry.      Coloration: Skin is not pale.      Findings: No erythema or rash.   Neurological:      Mental Status: He is oriented to person, place, and time and easily aroused. He is lethargic.      GCS: GCS eye subscore is 4. GCS verbal subscore is 5. GCS motor subscore is 6.      Cranial Nerves: No cranial nerve deficit.      Motor: No abnormal muscle tone.      Coordination: Coordination normal.      Deep Tendon Reflexes: Reflexes normal.   Psychiatric:         Mood and Affect: Mood is anxious and depressed.         Speech: Speech is  delayed.         Behavior: Behavior is slowed.         Cognition and Memory: Cognition is impaired. Memory is impaired. He exhibits impaired recent memory and impaired remote memory.           CRANIAL NERVES     CN III, IV, VI   Pupils are equal, round, and reactive to light.       Significant Labs:   Recent Results (from the past 24 hour(s))   CBC auto differential    Collection Time: 08/07/20  9:15 PM   Result Value Ref Range    WBC 7.47 3.90 - 12.70 K/uL    RBC 4.76 4.60 - 6.20 M/uL    Hemoglobin 12.4 (L) 14.0 - 18.0 g/dL    Hematocrit 39.7 (L) 40.0 - 54.0 %    Mean Corpuscular Volume 83 82 - 98 fL    Mean Corpuscular Hemoglobin 26.1 (L) 27.0 - 31.0 pg    Mean Corpuscular Hemoglobin Conc 31.2 (L) 32.0 - 36.0 g/dL    RDW 14.6 (H) 11.5 - 14.5 %    Platelets 253 150 - 350 K/uL    MPV 9.3 9.2 - 12.9 fL    Immature Granulocytes 0.5 0.0 - 0.5 %    Gran # (ANC) 4.1 1.8 - 7.7 K/uL    Immature Grans (Abs) 0.04 0.00 - 0.04 K/uL    Lymph # 1.7 1.0 - 4.8 K/uL    Mono # 0.8 0.3 - 1.0 K/uL    Eos # 0.8 (H) 0.0 - 0.5 K/uL    Baso # 0.02 0.00 - 0.20 K/uL    nRBC 0 0 /100 WBC    Gran% 55.4 38.0 - 73.0 %    Lymph% 22.8 18.0 - 48.0 %    Mono% 10.8 4.0 - 15.0 %    Eosinophil% 10.2 (H) 0.0 - 8.0 %    Basophil% 0.3 0.0 - 1.9 %    Differential Method Automated    Lactate Dehydrogenase    Collection Time: 08/07/20  9:15 PM   Result Value Ref Range     110 - 260 U/L   Lactic Acid, Plasma    Collection Time: 08/07/20  9:15 PM   Result Value Ref Range    Lactate (Lactic Acid) 1.1 0.5 - 2.2 mmol/L   Procalcitonin    Collection Time: 08/07/20  9:15 PM   Result Value Ref Range    Procalcitonin 0.06 <0.25 ng/mL   Brain Natriuretic Peptide    Collection Time: 08/07/20  9:15 PM   Result Value Ref Range    BNP 76 0 - 99 pg/mL   Comprehensive metabolic panel    Collection Time: 08/07/20  9:17 PM   Result Value Ref Range    Sodium 135 (L) 136 - 145 mmol/L    Potassium 4.3 3.5 - 5.1 mmol/L    Chloride 100 95 - 110 mmol/L    CO2 26 23 - 29  mmol/L    Glucose 118 (H) 70 - 110 mg/dL    BUN, Bld 20 6 - 20 mg/dL    Creatinine 1.3 0.5 - 1.4 mg/dL    Calcium 8.8 8.7 - 10.5 mg/dL    Total Protein 8.4 6.0 - 8.4 g/dL    Albumin 3.9 3.5 - 5.2 g/dL    Total Bilirubin 0.4 0.1 - 1.0 mg/dL    Alkaline Phosphatase 82 55 - 135 U/L    AST 16 10 - 40 U/L    ALT 13 10 - 44 U/L    Anion Gap 9 8 - 16 mmol/L    eGFR if African American >60 >60 mL/min/1.73 m^2    eGFR if non African American >60 >60 mL/min/1.73 m^2   C-Reactive Protein    Collection Time: 08/07/20  9:17 PM   Result Value Ref Range    CRP 7.0 0.0 - 8.2 mg/L   CK    Collection Time: 08/07/20  9:17 PM   Result Value Ref Range    CPK 25 20 - 200 U/L   Troponin I    Collection Time: 08/07/20  9:17 PM   Result Value Ref Range    Troponin I 0.012 0.000 - 0.026 ng/mL   Magnesium    Collection Time: 08/07/20  9:17 PM   Result Value Ref Range    Magnesium 2.1 1.6 - 2.6 mg/dL   Phosphorus    Collection Time: 08/07/20  9:17 PM   Result Value Ref Range    Phosphorus 3.7 2.7 - 4.5 mg/dL   APTT    Collection Time: 08/07/20  9:17 PM   Result Value Ref Range    aPTT 27.6 21.0 - 32.0 sec   Protime-INR    Collection Time: 08/07/20  9:17 PM   Result Value Ref Range    Prothrombin Time 10.0 9.0 - 12.5 sec    INR 0.9 0.8 - 1.2   Lipase    Collection Time: 08/07/20  9:17 PM   Result Value Ref Range    Lipase 42 4 - 60 U/L   Lipase    Collection Time: 08/07/20  9:17 PM   Result Value Ref Range    Lipase 42 4 - 60 U/L   D dimer, quantitative    Collection Time: 08/07/20  9:17 PM   Result Value Ref Range    D-Dimer 0.44 <0.50 mg/L FEU   COVID-19 Rapid Screening    Collection Time: 08/07/20  9:22 PM   Result Value Ref Range    SARS-CoV-2 RNA, Amplification, Qual Negative Negative   ISTAT PROCEDURE    Collection Time: 08/07/20 10:12 PM   Result Value Ref Range    POC PH 7.322 (L) 7.35 - 7.45    POC PCO2 53.1 (H) 35 - 45 mmHg    POC PO2 69 (L) 80 - 100 mmHg    POC HCO3 27.5 24 - 28 mmol/L    POC BE 1 -2 to 2 mmol/L    POC SATURATED O2  92 (L) 95 - 100 %    POC TCO2 29 (H) 23 - 27 mmol/L    Rate 18     Sample ARTERIAL     Site RR     Allens Test Pass     DelSys CPAP/BiPAP     Mode BiPAP     FiO2 100     Spont Rate 21     Min Vol 13.7     Sp02 100     IP 10     EP 5    POCT Influenza A/B Molecular    Collection Time: 08/08/20 12:08 AM   Result Value Ref Range    POC Molecular Influenza A Ag Negative Negative, Not Reported    POC Molecular Influenza B Ag Negative Negative, Not Reported     Acceptable Yes        Significant Imaging:   XR CHEST AP PORTABLE   FINDINGS:  Monitoring EKG leads are present.  The trachea is unremarkable.  There are calcifications of the aortic knob.  There is unchanged appearance of a trace right-sided pleural effusion.  There is no pleural effusion on the left.  There is no evidence of a pneumothorax.  There is no evidence of pneumomediastinum.  There are minimal residual airspace opacities in the lung bases.  The osseous structures are unremarkable.     Impression:   Stable examination.      Electronically signed by: New Falcon MD  Date:                                            08/07/2020  Time:                                           21:35    07-AUG-2020 21:20:19 EKG    Sinus tachycardia  Vent. rate 115 BPM  OK interval 138 ms  QRS duration 86 ms  QT/QTc 342/473 ms  Right atrial enlargement  Borderline Abnormal ECG  When compared with ECG of 29-JUL-2020 21:28,  No significant change was found

## 2020-08-08 NOTE — ASSESSMENT & PLAN NOTE
Clovisonebs Q4 hours scheduled and Q4 hours prn  He was given Dexamethasone 12mg and Solumedrol 125mg in the ED  I'm giving one more dose of Solumedrol 125mg iv x 1 at 3am   -Hopefully can transition to just Prednisone 40mg po qam by morning  He was empirically given Rocephin 1g and Zithro 500mg IV in the ED   -Start Augmentin 875mg po bid x 5 days   -Start Zithro 250mg po qday x 4 days  PRN robitussin DM and Tessalon Pearles

## 2020-08-08 NOTE — PROGRESS NOTES
LINN contacted [place] with [facility] concerning pt recommended for a Nebulizer.  Pt was approved for Nebulizer and LINN contacted Respiratory Therapy to provide the above information.

## 2020-08-08 NOTE — PLAN OF CARE
SW met with patient to complete discharge needs assessment. Pt provided two identifiers prior to initating needs assessment.  Patient was able to verbalize his help at home is mother, Marilou Carranza. He also receives help from his sister and niece.  SW discussed with patient the things he's responsible for to manage his health at home would be by going on his doctor appointments, taking medications as prescribed, and getting prescriptions filled. SW wrote name and phone number on white communication board. Patient prefers appointments around 10:00 AM or 11:00 AM.     Cullen Zimmer MD     Extended Emergency Contact Information  Primary Emergency Contact: Amarilis Easley   [place]  Mobile Phone: [phone]  Relation: Sister       [id number] - [place] - [address]  [address] [place] [address]  Phone: [phone] Fax: [phone]    [facility] ([place] - [place] - [address] [place] [address]  [address] [place] [address]  [place] [address]  Phone: [phone] Fax: [phone]        08/08/20 1331   Discharge Assessment   Assessment Type Discharge Planning Assessment   Confirmed/corrected address and phone number on facesheet? Yes   Assessment information obtained from? Patient   Prior to hospitilization cognitive status: Alert/Oriented   Prior to hospitalization functional status: Needs Assistance;Independent   Current cognitive status: Alert/Oriented   Current Functional Status: Independent;Needs Assistance   Facility Arrived From: Ambulance   Lives With parent(s)   Able to Return to Prior Arrangements yes   Is patient able to care for self after discharge? Yes   Who are your caregiver(s) and their phone number(s)? Debra Carranza, mother [phone] and Amarilis Easley, sister [phone]   Patient's perception of discharge disposition home or selfcare   Readmission Within the Last 30 Days no previous admission in last 30 days   Patient currently being followed by outpatient  case management? No   Patient currently receives any other outside agency services? No   Equipment Currently Used at Home none   Do you have any problems affording any of your prescribed medications? No   Is the patient taking medications as prescribed? yes   Does the patient have transportation home? Yes   Transportation Anticipated family or friend will provide   Dialysis Name and Scheduled days Not Applicable.   Does the patient receive services at the [facility]? No   Discharge Plan A Home with family   DME Needed Upon Discharge  none   Patient/Family in Agreement with Plan yes

## 2020-08-08 NOTE — DISCHARGE SUMMARY
"[facility] Hospital Medicine  Discharge Summary      Patient Name: Cali Mabry  MRN: [id number]  Admission Date: 8/7/2020  Hospital Length of Stay: 1 days  Discharge Date and Time:  08/08/2020 4:50 PM  Attending Physician: No att. providers found   Discharging Provider: Edie Kelly MD  Primary Care Provider: Cullen Zimmer MD      HPI:   Mr. Mabry is a 55yo man with a past medical history of HTN, IV heroin abuse, DM1 with DKA, and COPD due to long term cigarette smoking.  He tells me that he stopped smoking 3 months ago.  When asked what happened today, he stated, "I'm not really sure.  You know I'm a little foggy-headed.  I know I was at home and suddenly it just hit me like a ton of bricks and I got short of breath and it kicked me on my ass."  He lives with his mother, so when she got home he borrowed her albuterol nebulizer a few times, which, "seems like it didn't do nothing at all," per Mr. Mabry.      He states around this time he became confused and can't remember who called 911, though he does remember gasping for air and feeling he could not breathe.  He did note wheezing and some dry cough, but no fever or sputum.  He also denies karissa CP, but did have some pressure.   He remains on BiPAP during my interview, and though he has slow mentation, he is alert and conversant.    The RN who took triage report from EMS stated, "Pt presents to ED via EMS from home with c/o SOB. Per EMS pt has hx of COPD and  RA sat 78% upon their arrival. + wheezing. + smoker. Pt given duoneb with additional albuterol and  placed on CPAP  While in route to ED with EMS. Pt was up to 99% upon ED arrival on CPAP."    * No surgery found *      Hospital Course:   Mr Mabry presented with acute respiratory failure with hypoxia. Placed on NIPPV, high dose IV methylprednisolone and bronchodilator treatment. Required admission. Patient quickly improved. Taken off NIPPV and stayed very stable at room " "air. With residual expiratory wheezing but asymptomatic. No consolidation on chest radiography but emphysematous changes are very notable on radiograph. Procalcitonin, D-dimer, BNP and troponin all negative. Agree with continuing azithromycin however given anti-inflammatory effects and severity of acute condition. High doses of methylprednisolone caused significantly increased blood glucose. Insulin regimen adjusted for better control however patient is known poorly controlled diabetic at home (per patient, glucose "usually high"). Toxicology screen positive for methadone when he has not been prescribed any controlled substances since March of this year (a bit more than 5 months from now).  Patient discharged to take 4 more days of azithromycin 250 mg daily and a lower dose of prednisone at 20 mg daily.  Discussed side effect of hyperglycemia with prednisone.  Patient verbalized understanding.  Prescribed nebulizer machine and treatments to be used as needed.  He states he already takes maintenance bronchodilators and has rescue albuterol inhaler as well.  Discharge in stable condition and able to ambulate independently in no distress.  Follow-up with primary care physician.  Regular diet.  Activity as tolerated.     Final Active Diagnoses:    Diagnosis Date Noted POA    PRINCIPAL PROBLEM:  COPD exacerbation [J44.1] 08/08/2020 Yes    Polysubstance abuse [F19.10] 04/23/2020 Yes    Acute respiratory failure with hypoxia and hypercarbia [J96.01, J96.02] 04/19/2020 Unknown    Chronic hepatitis C [B18.2] 11/23/2017 Yes     Chronic    Tobacco use disorder, moderate, dependence [F17.200] 11/23/2017 Yes    Essential hypertension [I10] 04/03/2016 Yes     Chronic    Type 1 diabetes mellitus with hyperglycemia [E10.65] 04/03/2016 Yes    Anemia of chronic disease [D63.8] 04/03/2016 Yes      Problems Resolved During this Admission:    Diagnosis Date Noted Date Resolved POA    Cocaine abuse [F14.10] 08/05/2018 08/08/2020 " "Yes       Discharged Condition: good    Disposition: Home or Self Care    Follow Up:  Follow-up Information     Cullen Zimmer MD.    Specialty: Internal Medicine  Why: Please call office Monday, August 10th to schedule a one week hospital discharge f/u appointment  Contact information:  [address]  [address]  [place] [address]  [phone]                 Patient Instructions:      NEBULIZER FOR HOME USE     Order Specific Question Answer Comments   Height: 5' 8" (1.727 m)    Weight: 66.8 kg (147 lb 4.3 oz)    Length of need (1-99 months): 99        Medications:  Reconciled Home Medications:      Medication List      START taking these medications    azithromycin 250 MG tablet  Commonly known as: Z-IRASEMA  Take 1 tablet (250 mg total) by mouth once daily. for 3 days  Start taking on: August 9, 2020     predniSONE 20 MG tablet  Commonly known as: DELTASONE  Take 1 tablet (20 mg total) by mouth once daily. for 3 days  Start taking on: August 9, 2020        CHANGE how you take these medications    albuterol-ipratropium 2.5 mg-0.5 mg/3 mL nebulizer solution  Commonly known as: DUO-NEB  Take 3 mLs by nebulization every 4 (four) hours as needed for Wheezing or Shortness of Breath. Rescue  What changed: reasons to take this     polyethylene glycol 17 gram Pwpk  Commonly known as: GLYCOLAX  Take 17 g by mouth daily as needed (Constipation).  What changed: when to take this        CONTINUE taking these medications    acetaminophen 650 MG Tbsr  Commonly known as: TYLENOL  Take 650 mg by mouth every 8 (eight) hours as needed.     albuterol 90 mcg/actuation inhaler  Commonly known as: PROVENTIL/VENTOLIN HFA  Inhale 1-2 puffs into the lungs every 6 (six) hours as needed for Wheezing or Shortness of Breath. Rescue     ASCORBIC ACID (VITAMIN C) ORAL  Take 1 tablet by mouth once daily.     aspirin 325 MG EC tablet  Commonly known as: ECOTRIN  Take 1 tablet (325 mg total) by mouth once daily.     atorvastatin 20 MG " tablet  Commonly known as: LIPITOR  Take 20 mg by mouth once daily.     b complex vitamins tablet  Take 1 tablet by mouth once daily.     bisacodyL 10 mg Supp  Commonly known as: DULCOLAX  Place 10 mg rectally daily as needed.     calcium carbonate 200 mg calcium (500 mg) chewable tablet  Commonly known as: TUMS  Take 1 tablet (500 mg total) by mouth daily as needed for Heartburn.     diphenhydrAMINE 25 mg capsule  Commonly known as: BENADRYL  Take 25 mg by mouth every 6 (six) hours as needed for Itching.     divalproex 125 MG EC tablet  Commonly known as: DEPAKOTE  Take 125 mg by mouth every 8 (eight) hours.     docusate sodium 100 MG capsule  Commonly known as: COLACE  Take 1 capsule (100 mg total) by mouth 2 (two) times daily as needed for Constipation.     DULoxetine 60 MG capsule  Commonly known as: CYMBALTA  Take 1 capsule (60 mg total) by mouth once daily.     folic acid 800 MCG Tab  Commonly known as: FOLVITE  Take 800 mcg by mouth once daily.     folic acid-vit B6-vit B12 2.5-25-2 mg 2.5-25-2 mg Tab  Commonly known as: FOLBIC or Equiv  Take 1 tablet by mouth once daily.     hydrOXYzine HCL 25 MG tablet  Commonly known as: ATARAX  Take 1 tablet (25 mg total) by mouth 3 (three) times daily as needed for Anxiety.     insulin aspart U-100 100 unit/mL injection  Commonly known as: NOVOLOG  Inject 1-10 Units into the skin 3 (three) times daily with meals.     insulin  unit/mL injection  Inject 30 Units into the skin every evening.     melatonin 3 mg tablet  Commonly known as: MELATIN  Take 2 tablets (6 mg total) by mouth nightly as needed for Insomnia.     mineral oil enema  Commonly known as: FLEET OIL RETENTION  Place 1 enema rectally daily as needed for Constipation.     multivitamin per tablet  Commonly known as: THERAGRAN  Take 1 tablet by mouth once daily.     naloxone 4 mg/actuation Spry  Commonly known as: NARCAN  4mg by nasal route as needed for opioid overdose; may repeat every 2-3 minutes in  alternating nostrils until medical help arrives. Call 911     ondansetron 8 MG Tbdl  Commonly known as: ZOFRAN-ODT  Take 1 tablet (8 mg total) by mouth every 8 (eight) hours as needed.     QUEtiapine 50 MG tablet  Commonly known as: SEROQUEL  Take 50 mg by mouth every evening.     traZODone 50 MG tablet  Commonly known as: DESYREL  Take 25 mg by mouth every evening.       Indwelling Lines/Drains at time of discharge:   Lines/Drains/Airways     None                 Time spent on the discharge of patient: > 35 minutes  Patient was seen and examined on the date of discharge and determined to be suitable for discharge.         Edie Kelly MD  Department of Hospital Medicine  [facility]

## 2020-08-08 NOTE — ED PROVIDER NOTES
"Encounter Date: 8/7/2020       History     Chief Complaint   Patient presents with    Shortness of Breath     from home, hx of COPD, RA sat 78% with EMS, placed on CPAP, given duoneb with additional albuterol, placed on CPAP in route up to 99%     54 y.o. male Past Medical History:  4/3/2016: Acute on chronic pancreatitis  02/2020: DKA (diabetic ketoacidoses)  No date: DM (diabetes mellitus), type 1  No date: HCV (hepatitis C virus)      Comment:  high VL   No date: HTN (hypertension)  No date: IVDU (intravenous drug user)      Comment:  Heroin  No date: Pancreatitis     Hx of copd, found by medics in resp distress with RA o2 sat 78%, pt notes that the sob hit him "all of the sudden today". States that he has been coughing. Denies f/c, n/v, diarrhea/dysuria, has not been sick recently. Denies change in smell/taste. Medics gave pt 2 nebs and started him on cpap prior to presenting to ED with mild improvement.        Review of patient's allergies indicates:  No Known Allergies  Past Medical History:   Diagnosis Date    Acute on chronic pancreatitis 4/3/2016    DKA (diabetic ketoacidoses) 02/2020    DM (diabetes mellitus), type 1     HCV (hepatitis C virus)     high VL     HTN (hypertension)     IVDU (intravenous drug user)     Heroin    Pancreatitis      Past Surgical History:   Procedure Laterality Date    CARDIAC SURGERY  1999    stent placed. ([facility])    ESOPHAGOGASTRODUODENOSCOPY N/A 3/5/2020    Gastritis.  No H pylori.  Completed PPI for 1 month.  Procedure: EGD (ESOPHAGOGASTRODUODENOSCOPY);  Surgeon: Brinda Rene MD;  Location: [facility];  Service: Endoscopy;  Laterality: N/A;  [id number] A; [phone]    SHOULDER SURGERY  2013    right shoulder, spur removal.     Family History   Problem Relation Age of Onset    Asthma Mother      Social History     Tobacco Use    Smoking status: Former Smoker     Packs/day: 1.00     Years: 20.00     Pack years: 20.00     Types: Cigarettes     Start date: " 4/3/1981     Quit date: 10/2019     Years since quittin.8    Smokeless tobacco: Never Used   Substance Use Topics    Alcohol use: No     Alcohol/week: 0.0 standard drinks    Drug use: Not Currently     Types: IV, Marijuana, Heroin     Comment: currently, used today, herion     Review of Systems   Constitutional: Negative for fever.   HENT: Negative for sore throat.    Respiratory: Positive for cough and shortness of breath.    Cardiovascular: Negative for chest pain.   Gastrointestinal: Negative for nausea.   Genitourinary: Negative for dysuria.   Musculoskeletal: Negative for back pain.   Skin: Negative for rash.   Neurological: Negative for weakness.   Hematological: Does not bruise/bleed easily.   All other systems reviewed and are negative.      Physical Exam     Initial Vitals [20]   BP Pulse Resp Temp SpO2   (!) 174/84 (!) 121 (!) 32 -- 99 %      MAP       --         Physical Exam    Nursing note and vitals reviewed.  Constitutional: He appears well-developed and well-nourished.   HENT:   Head: Normocephalic and atraumatic.   Eyes: EOM are normal. Pupils are equal, round, and reactive to light.   Cardiovascular: Normal rate and regular rhythm.   Pulmonary/Chest: Effort normal. He has wheezes.   Abdominal: He exhibits no distension.   Musculoskeletal: No tenderness or edema.   Neurological: He is alert and oriented to person, place, and time.   Skin: Skin is warm and dry.     very anxious upon arrival, constantly asking for water, at times frantic.    ED Course   External Jugular IV    Date/Time: 2020 9:20 PM  Performed by: Capo Callahan MD  Authorized by: Capo Callahan MD   Consent Done: Emergent Situation  Location (Ext Jugular): Right.  Area Prepped With: Chlorohexidine.  Catheter Size: 18 ga.  Catheter Type: Jelco.  Number of attempts: 1  Fixation/Dressing: Taped in place, Sterile Dressing and Tegaderm.  Patient tolerance: Patient tolerated the procedure well with  no immediate complications    External Jugular IV    Date/Time: 8/7/2020 9:21 PM  Performed by: Capo Callahan MD  Authorized by: Capo Callahan MD   Consent Done: Emergent Situation  Anesthesia: local infiltration  Location (Ext Jugular): Left.  Area Prepped With: Chlorohexidine.  Catheter Size: 18 ga.  Catheter Type: Jelco.  Number of attempts: 1  Fixation/Dressing: Taped in place, Sterile Dressing and Tegaderm.  Patient tolerance: Patient tolerated the procedure well with no immediate complications        Labs Reviewed   CULTURE, BLOOD   CULTURE, BLOOD   CBC W/ AUTO DIFFERENTIAL   COMPREHENSIVE METABOLIC PANEL   C-REACTIVE PROTEIN   FERRITIN   LACTATE DEHYDROGENASE   CK   LACTIC ACID, PLASMA   TROPONIN I   SARS-COV-2 RNA AMPLIFICATION, QUAL   PROCALCITONIN   B-TYPE NATRIURETIC PEPTIDE   URINALYSIS, REFLEX TO URINE CULTURE   MAGNESIUM   PHOSPHORUS   APTT   PROTIME-INR   LIPASE   POCT INFLUENZA A/B MOLECULAR          Imaging Results    None                       Attending Attestation:         Attending Critical Care:   Critical Care Times:   Direct Patient Care (initial evaluation, reassessments, and time considering the case)................................................................60 minutes.   Additional History from reviewing old medical records or taking additional history from the family, EMS, PCP, etc.......................10 minutes.   Ordering, Reviewing, and Interpreting Diagnostic Studies...............................................................................................................10 minutes.   Documentation..................................................................................................................................................................................10 minutes.   Consultation with other Physicians.  .................................................................................................................................................10 minutes.   ==============================================================  · Total Critical Care Time - exclusive of procedural time: 100 minutes.  ==============================================================                pt clinically improved on bipap       abg noted, have increased bipap to 12 and 7    Labs Reviewed   CBC W/ AUTO DIFFERENTIAL - Abnormal; Notable for the following components:       Result Value    Hemoglobin 12.4 (*)     Hematocrit 39.7 (*)     Mean Corpuscular Hemoglobin 26.1 (*)     Mean Corpuscular Hemoglobin Conc 31.2 (*)     RDW 14.6 (*)     Eos # 0.8 (*)     Eosinophil% 10.2 (*)     All other components within normal limits   COMPREHENSIVE METABOLIC PANEL - Abnormal; Notable for the following components:    Sodium 135 (*)     Glucose 118 (*)     All other components within normal limits   ISTAT PROCEDURE - Abnormal; Notable for the following components:    POC PH 7.322 (*)     POC PCO2 53.1 (*)     POC PO2 69 (*)     POC SATURATED O2 92 (*)     POC TCO2 29 (*)     All other components within normal limits   CULTURE, BLOOD   CULTURE, BLOOD   C-REACTIVE PROTEIN   LACTATE DEHYDROGENASE   CK   LACTIC ACID, PLASMA   TROPONIN I   SARS-COV-2 RNA AMPLIFICATION, QUAL   PROCALCITONIN   B-TYPE NATRIURETIC PEPTIDE   MAGNESIUM   PHOSPHORUS   APTT   PROTIME-INR   LIPASE   LIPASE   D DIMER, QUANTITATIVE   FERRITIN   URINALYSIS, REFLEX TO URINE CULTURE   POCT INFLUENZA A/B MOLECULAR       X-Ray Chest AP Portable   Final Result      Stable examination.         Electronically signed by: New Falcon MD   Date:    08/07/2020   Time:    21:35          I have told pt that the ICU here is full and that he would be transferred to [facility]. Patient starts to scream/yell that he refuses to go and will stay here.    I have d/w house supervisor who is aware of pt  refusal of transfer. Will make ER hold for now.      Pt has several times during ED visit started screaming/cursing at various staff. He is very emotionally labile. I have tried to calm patient.  Clinical Impression:       ICD-10-CM ICD-9-CM   1. COPD exacerbation  J44.1 491.21   2. Hypoxia  R09.02 799.02   3. Respiratory distress  R06.03 786.09                                Capo Callahan MD  08/08/20 0002       Capo Callahan MD  08/08/20 0241

## 2020-08-08 NOTE — PROGRESS NOTES
[facility] DISCHARGE INSTRUCTIONS  Follow-up Information     Cullen Zimmer MD.    Specialty: Internal Medicine  Why: Please call office Monday, August 10th to schedule a one week hospital discharge f/u appointment  Contact information:  [address]  [address]  [place] [address]  [phone]               APPOINTMENTS AND RESOURCES TO HELP YOU MANAGE YOUR CARE AT HOME BASED ON YOUR PREFERENCES:  (If an appointment is not scheduled for you when you leave the hospital, call your doctor to schedule a follow up visit within a week)    Healthy Living Instructions to HELP MANAGE YOUR CARE AT HOME:  Things You are responsible for:  1.    Getting your prescriptions filled   2.    Taking your medications as directed, DO NOT MISS ANY DOSES!  3.    Following the diet and exercise recommended by your doctor  4.    Going to your follow-up doctor appointment. This is important because it allows the doctor to monitor your progress and determine if any changes need to made to your treatment plan.  5. If you have any questions about MANAGING YOUR CARE AT HOME Call the Nurse Care Line for 24/7 Assistance [phone]       Please answer any calls you may receive from [facility]. We want to continue to support you as you manage your healthcare needs. [facility] is happy to have the opportunity to serve you.      Thank you for choosing [facility] for your healthcare needs!  Your [facility] Case Management Team,

## 2020-08-09 ENCOUNTER — HOSPITAL ENCOUNTER (EMERGENCY)
Facility: HOSPITAL | Age: 55
Discharge: HOME | End: 2020-08-09
Attending: EMERGENCY MEDICINE | Admitting: EMERGENCY MEDICINE
Payer: MEDICARE

## 2020-08-09 VITALS
SYSTOLIC BLOOD PRESSURE: 138 MMHG | DIASTOLIC BLOOD PRESSURE: 60 MMHG | RESPIRATION RATE: 19 BRPM | HEART RATE: 71 BPM | TEMPERATURE: 99 F | BODY MASS INDEX: 21.22 KG/M2 | WEIGHT: 140 LBS | HEIGHT: 68 IN | OXYGEN SATURATION: 98 %

## 2020-08-09 DIAGNOSIS — M25.559 HIP PAIN: ICD-10-CM

## 2020-08-09 DIAGNOSIS — J18.9 PNEUMONIA OF RIGHT LOWER LOBE DUE TO INFECTIOUS ORGANISM: ICD-10-CM

## 2020-08-09 DIAGNOSIS — R79.89 ELEVATED LACTIC ACID LEVEL: ICD-10-CM

## 2020-08-09 DIAGNOSIS — R07.9 CHEST PAIN: Primary | ICD-10-CM

## 2020-08-09 PROBLEM — R78.81 BACTEREMIA: Status: ACTIVE | Noted: 2020-08-09

## 2020-08-09 LAB
ALBUMIN SERPL BCP-MCNC: 3.5 G/DL (ref 3.5–5.2)
ALP SERPL-CCNC: 78 U/L (ref 55–135)
ALT SERPL W/O P-5'-P-CCNC: 13 U/L (ref 10–44)
ANION GAP SERPL CALC-SCNC: 9 MMOL/L (ref 8–16)
AST SERPL-CCNC: 17 U/L (ref 10–40)
BACTERIA #/AREA URNS HPF: NORMAL /HPF
BASOPHILS # BLD AUTO: 0.02 K/UL (ref 0–0.2)
BASOPHILS NFR BLD: 0.2 % (ref 0–1.9)
BILIRUB SERPL-MCNC: 0.2 MG/DL (ref 0.1–1)
BILIRUB UR QL STRIP: NEGATIVE
BNP SERPL-MCNC: 213 PG/ML (ref 0–99)
BUN SERPL-MCNC: 20 MG/DL (ref 6–20)
CALCIUM SERPL-MCNC: 8.5 MG/DL (ref 8.7–10.5)
CHLORIDE SERPL-SCNC: 102 MMOL/L (ref 95–110)
CLARITY UR: CLEAR
CO2 SERPL-SCNC: 25 MMOL/L (ref 23–29)
COLOR UR: ABNORMAL
CREAT SERPL-MCNC: 1.1 MG/DL (ref 0.5–1.4)
D DIMER PPP IA.FEU-MCNC: 0.27 MG/L FEU
DIFFERENTIAL METHOD: ABNORMAL
EOSINOPHIL # BLD AUTO: 0.6 K/UL (ref 0–0.5)
EOSINOPHIL NFR BLD: 5.9 % (ref 0–8)
ERYTHROCYTE [DISTWIDTH] IN BLOOD BY AUTOMATED COUNT: 14.9 % (ref 11.5–14.5)
EST. GFR  (AFRICAN AMERICAN): >60 ML/MIN/1.73 M^2
EST. GFR  (NON AFRICAN AMERICAN): >60 ML/MIN/1.73 M^2
GLUCOSE SERPL-MCNC: 241 MG/DL (ref 70–110)
GLUCOSE UR QL STRIP: ABNORMAL
HCT VFR BLD AUTO: 37.6 % (ref 40–54)
HGB BLD-MCNC: 11.8 G/DL (ref 14–18)
HGB UR QL STRIP: NEGATIVE
IMM GRANULOCYTES # BLD AUTO: 0.05 K/UL (ref 0–0.04)
IMM GRANULOCYTES NFR BLD AUTO: 0.5 % (ref 0–0.5)
KETONES UR QL STRIP: NEGATIVE
LACTATE SERPL-SCNC: 2.3 MMOL/L (ref 0.5–2.2)
LACTATE SERPL-SCNC: 2.8 MMOL/L (ref 0.5–2.2)
LEUKOCYTE ESTERASE UR QL STRIP: NEGATIVE
LYMPHOCYTES # BLD AUTO: 2.2 K/UL (ref 1–4.8)
LYMPHOCYTES NFR BLD: 23.4 % (ref 18–48)
MCH RBC QN AUTO: 26.2 PG (ref 27–31)
MCHC RBC AUTO-ENTMCNC: 31.4 G/DL (ref 32–36)
MCV RBC AUTO: 83 FL (ref 82–98)
MICROSCOPIC COMMENT: NORMAL
MONOCYTES # BLD AUTO: 0.8 K/UL (ref 0.3–1)
MONOCYTES NFR BLD: 8.3 % (ref 4–15)
NEUTROPHILS # BLD AUTO: 5.7 K/UL (ref 1.8–7.7)
NEUTROPHILS NFR BLD: 61.7 % (ref 38–73)
NITRITE UR QL STRIP: NEGATIVE
NRBC BLD-RTO: 0 /100 WBC
PH UR STRIP: 6 [PH] (ref 5–8)
PLATELET # BLD AUTO: 259 K/UL (ref 150–350)
PMV BLD AUTO: 9.6 FL (ref 9.2–12.9)
POCT GLUCOSE: 108 MG/DL (ref 70–110)
POCT GLUCOSE: 58 MG/DL (ref 70–110)
POTASSIUM SERPL-SCNC: 4.4 MMOL/L (ref 3.5–5.1)
PROT SERPL-MCNC: 7.7 G/DL (ref 6–8.4)
PROT UR QL STRIP: NEGATIVE
RBC # BLD AUTO: 4.51 M/UL (ref 4.6–6.2)
RBC #/AREA URNS HPF: 1 /HPF (ref 0–4)
SARS-COV-2 RDRP RESP QL NAA+PROBE: NEGATIVE
SODIUM SERPL-SCNC: 136 MMOL/L (ref 136–145)
SP GR UR STRIP: 1.01 (ref 1–1.03)
SQUAMOUS #/AREA URNS HPF: 1 /HPF
TROPONIN I SERPL DL<=0.01 NG/ML-MCNC: 0.02 NG/ML (ref 0–0.03)
URN SPEC COLLECT METH UR: ABNORMAL
UROBILINOGEN UR STRIP-ACNC: NEGATIVE EU/DL
WBC # BLD AUTO: 9.25 K/UL (ref 3.9–12.7)
WBC #/AREA URNS HPF: 1 /HPF (ref 0–5)
YEAST URNS QL MICRO: NORMAL

## 2020-08-09 PROCEDURE — 96361 HYDRATE IV INFUSION ADD-ON: CPT

## 2020-08-09 PROCEDURE — 96366 THER/PROPH/DIAG IV INF ADDON: CPT

## 2020-08-09 PROCEDURE — 93010 ELECTROCARDIOGRAM REPORT: CPT | Mod: ,,, | Performed by: INTERNAL MEDICINE

## 2020-08-09 PROCEDURE — 83605 ASSAY OF LACTIC ACID: CPT | Mod: 91

## 2020-08-09 PROCEDURE — 99900035 HC TECH TIME PER 15 MIN (STAT)

## 2020-08-09 PROCEDURE — 93010 EKG 12-LEAD: ICD-10-PCS | Mod: ,,, | Performed by: INTERNAL MEDICINE

## 2020-08-09 PROCEDURE — 85025 COMPLETE CBC W/AUTO DIFF WBC: CPT

## 2020-08-09 PROCEDURE — 81000 URINALYSIS NONAUTO W/SCOPE: CPT

## 2020-08-09 PROCEDURE — 83880 ASSAY OF NATRIURETIC PEPTIDE: CPT

## 2020-08-09 PROCEDURE — 84484 ASSAY OF TROPONIN QUANT: CPT

## 2020-08-09 PROCEDURE — 99285 EMERGENCY DEPT VISIT HI MDM: CPT | Mod: 25

## 2020-08-09 PROCEDURE — 87040 BLOOD CULTURE FOR BACTERIA: CPT | Mod: 59

## 2020-08-09 PROCEDURE — 82962 GLUCOSE BLOOD TEST: CPT

## 2020-08-09 PROCEDURE — 63700000 PHARM REV CODE 250 ALT 637 W/O HCPCS: Performed by: PHYSICIAN ASSISTANT

## 2020-08-09 PROCEDURE — 25000003 PHARM REV CODE 250: Performed by: PHYSICIAN ASSISTANT

## 2020-08-09 PROCEDURE — 63600175 PHARM REV CODE 636 W HCPCS: Performed by: PHYSICIAN ASSISTANT

## 2020-08-09 PROCEDURE — 93005 ELECTROCARDIOGRAM TRACING: CPT

## 2020-08-09 PROCEDURE — 96365 THER/PROPH/DIAG IV INF INIT: CPT

## 2020-08-09 PROCEDURE — 85379 FIBRIN DEGRADATION QUANT: CPT

## 2020-08-09 PROCEDURE — 80053 COMPREHEN METABOLIC PANEL: CPT

## 2020-08-09 PROCEDURE — U0002 COVID-19 LAB TEST NON-CDC: HCPCS

## 2020-08-09 RX ORDER — BENZONATATE 100 MG/1
100 CAPSULE ORAL 3 TIMES DAILY PRN
Qty: 20 CAPSULE | Refills: 0 | Status: SHIPPED | OUTPATIENT
Start: 2020-08-09 | End: 2020-08-16

## 2020-08-09 RX ORDER — BENZONATATE 100 MG/1
100 CAPSULE ORAL
Status: DISCONTINUED | OUTPATIENT
Start: 2020-08-09 | End: 2020-08-09 | Stop reason: HOSPADM

## 2020-08-09 RX ORDER — IPRATROPIUM BROMIDE AND ALBUTEROL SULFATE 2.5; .5 MG/3ML; MG/3ML
3 SOLUTION RESPIRATORY (INHALATION)
Status: DISCONTINUED | OUTPATIENT
Start: 2020-08-09 | End: 2020-08-09 | Stop reason: HOSPADM

## 2020-08-09 RX ORDER — AZITHROMYCIN 250 MG/1
500 TABLET, FILM COATED ORAL
Status: COMPLETED | OUTPATIENT
Start: 2020-08-09 | End: 2020-08-09

## 2020-08-09 RX ADMIN — SODIUM CHLORIDE 1905 ML: 0.9 INJECTION, SOLUTION INTRAVENOUS at 12:08

## 2020-08-09 RX ADMIN — AZITHROMYCIN MONOHYDRATE 500 MG: 250 TABLET ORAL at 04:08

## 2020-08-09 RX ADMIN — DEXTROSE MONOHYDRATE 1250 MG: 50 INJECTION, SOLUTION INTRAVENOUS at 11:08

## 2020-08-09 NOTE — DISCHARGE INSTRUCTIONS
Take Azithromycin as previously prescribed for pneumonia starting tomorrow. Take Tessalon as prescribed for cough. Take Tylenol as previously prescribed for pain. Follow up with primary care in 2 days. Return to ER for worsening symptoms or as needed.

## 2020-08-09 NOTE — ED PROVIDER NOTES
Encounter Date: 8/9/2020    SCRIBE #1 NOTE: I, Joana De La Garza, am scribing for, and in the presence of,  Ines Bethea PA-C. I have scribed the following portions of the note - Other sections scribed: HPI, ROS, PE.       History     Chief Complaint   Patient presents with    abnormal labs     sent from MD office yesterday for abd lab work     CC: Abnormal lab result    HPI: This is a 54 y.o. male with a PMHx of HTN, T1DM, Hepatitis C, IVDU (last use reported 3 months ago)  who presents to the Emergency Department after receiving a call regarding positive blood culture yesterday. Pt discharged from this facility after 2 day admission for respiratory failure with hypoxia 2/2 COPD exacerbation. He states he has not filled his discharge medications including azithromycin yet. Pt states he has had constant 5/10 R sided CP and tightness for the past 3 days that is worse with inspiration. He reports associated mild SOB with no aggravating factors. He states he had a dry cough and wheezing that began this morning. Denies fever, chills, nausea, vomiting, diarrhea, abdominal pain, or leg swelling.  Patient states he used nebulizer twice last night to prevent SOB. He also states he is having L hip pain after fall that occurred few days ago. He states he is currently prescribed methadone. Is requesting xanax because he is angry about the situation. No known drug allergies.    The history is provided by the patient and medical records. No  was used.     Review of patient's allergies indicates:  No Known Allergies  Past Medical History:   Diagnosis Date    Acute on chronic pancreatitis 4/3/2016    DKA (diabetic ketoacidoses) 02/2020    DM (diabetes mellitus), type 1     HCV (hepatitis C virus)     high VL     HTN (hypertension)     IVDU (intravenous drug user)     Heroin    Pancreatitis      Past Surgical History:   Procedure Laterality Date    CARDIAC SURGERY  1999    stent [facility]. ([facility] [place])    ESOPHAGOGASTRODUODENOSCOPY N/A 3/5/2020    Gastritis.  No H pylori.  Completed PPI for 1 month.  Procedure: EGD (ESOPHAGOGASTRODUODENOSCOPY);  Surgeon: Brinda Rene MD;  Location: [facility];  Service: Endoscopy;  Laterality: N/A;  W421 A; [id number]    SHOULDER SURGERY      right shoulder, spur removal.     Family History   Problem Relation Age of Onset    Asthma Mother      Social History     Tobacco Use    Smoking status: Former Smoker     Packs/day: 1.00     Years: 20.00     Pack years: 20.00     Types: Cigarettes     Start date: 4/3/1981     Quit date: 10/2019     Years since quittin.8    Smokeless tobacco: Never Used   Substance Use Topics    Alcohol use: No     Alcohol/week: 0.0 standard drinks    Drug use: Not Currently     Types: IV, Marijuana, Heroin     Comment: currently, used today, herion     Review of Systems   Constitutional: Negative for chills and fever.   HENT: Negative for congestion, rhinorrhea and sore throat.    Eyes: Negative for visual disturbance.   Respiratory: Positive for cough, choking, chest tightness, shortness of breath and wheezing.    Cardiovascular: Positive for chest pain.   Gastrointestinal: Negative for abdominal pain, diarrhea, nausea and vomiting.   Genitourinary: Negative for dysuria, frequency and hematuria.   Musculoskeletal: Positive for arthralgias (left hip secondary to fall).   Skin: Negative for rash.   Neurological: Positive for light-headedness. Negative for dizziness, weakness and headaches.       Physical Exam     Initial Vitals [20 1056]   BP Pulse Resp Temp SpO2   (!) 142/75 102 18 98.9 °F (37.2 °C) 97 %      MAP       --         Physical Exam    Nursing note and vitals reviewed.  Constitutional: He appears well-developed and well-nourished. No distress.   HENT:   Head: Normocephalic.   Right Ear: Hearing, tympanic membrane, external ear and ear canal normal.   Left Ear: Hearing, tympanic membrane, external ear and ear canal  normal.   Nose: Nose normal.   Mouth/Throat: Oropharynx is clear and moist. No oropharyngeal exudate, posterior oropharyngeal edema or posterior oropharyngeal erythema.   Eyes: Conjunctivae are normal.   Neck: Neck supple.   Pulmonary/Chest: No respiratory distress. He exhibits tenderness (worsened with movement).   Abdominal: Soft. Bowel sounds are normal. He exhibits no distension. There is no abdominal tenderness. There is no rebound and no guarding.   Lymphadenopathy:     He has no cervical adenopathy.   Neurological: He is alert.   Skin: Skin is warm and dry. No rash noted.   Track marks to bilat forearms   Psychiatric: He has a normal mood and affect.         ED Course   Procedures  Labs Reviewed   CBC W/ AUTO DIFFERENTIAL - Abnormal; Notable for the following components:       Result Value    RBC 4.51 (*)     Hemoglobin 11.8 (*)     Hematocrit 37.6 (*)     Mean Corpuscular Hemoglobin 26.2 (*)     Mean Corpuscular Hemoglobin Conc 31.4 (*)     RDW 14.9 (*)     Immature Grans (Abs) 0.05 (*)     Eos # 0.6 (*)     All other components within normal limits   COMPREHENSIVE METABOLIC PANEL - Abnormal; Notable for the following components:    Glucose 241 (*)     Calcium 8.5 (*)     All other components within normal limits   B-TYPE NATRIURETIC PEPTIDE - Abnormal; Notable for the following components:     (*)     All other components within normal limits   URINALYSIS, REFLEX TO URINE CULTURE - Abnormal; Notable for the following components:    Glucose, UA 3+ (*)     All other components within normal limits    Narrative:     Specimen Source->Urine   LACTIC ACID, PLASMA - Abnormal; Notable for the following components:    Lactate (Lactic Acid) 2.8 (*)     All other components within normal limits   LACTIC ACID, PLASMA - Abnormal; Notable for the following components:    Lactate (Lactic Acid) 2.3 (*)     All other components within normal limits   POCT GLUCOSE - Abnormal; Notable for the following components:     POCT Glucose 58 (*)     All other components within normal limits   CULTURE, BLOOD   CULTURE, BLOOD   TROPONIN I   D DIMER, QUANTITATIVE   SARS-COV-2 RNA AMPLIFICATION, QUAL   URINALYSIS MICROSCOPIC    Narrative:     Specimen Source->Urine   POCT GLUCOSE MONITORING CONTINUOUS     EKG Readings: (Independently Interpreted)   This patient is in a normal sinus rhythm with a heart rate of 84.  The axis is normal.  There are no significant ST segment or T-wave changes.  There is no evidence of acute myocardial infarction or malignant arrhythmia.       Imaging Results          X-Ray Chest AP Portable (Final result)  Result time 08/09/20 11:46:33    Final result by Jonathan Cooney MD (08/09/20 11:46:33)                 Impression:      Unchanged small right pleural effusion.  New, minimal consolidation right lung base, possible pneumonia or aspiration.  Clinical correlation and follow-up recommended.      Electronically signed by: Jonathan Cooney MD  Date:    08/09/2020  Time:    11:46             Narrative:    EXAMINATION:  XR CHEST AP PORTABLE    CLINICAL HISTORY:  Chest pain, unspecified    TECHNIQUE:  Single frontal view of the chest was performed.    COMPARISON:  08/07/2020    FINDINGS:  Heart size, pulmonary vessels, and mediastinal contour are normal.  The lungs are expanded.  Left lung is clear.  Right lung base shows minimal, patchy consolidation; this could represent pneumonia, aspiration, atelectasis, etc.  Small effusions still blunts the right sulcus.  Skeletal structures show no acute change.                                 Medical Decision Making:   ED Management:  54-year-old male with history of hypertension, diabetes, hepatitis-C, IV drug use presenting after being contacted to return to ED due to positive blood cultures.  Positive for staph.  Coagulase negative.  Patient discharge for this facility yesterday after respiratory failure secondary to COPD exacerbation.  He has not been taking  medications as prescribed at discharge as he states he did not have a chance to get them filled.  Patient is not hypotensive.  He is mildly tachycardic.  In no distress.  He does reports that this morning he developed dry cough wheezing and worsening shortness of breath since discharge.  He has had constant right-sided chest pain since prior to initial evaluation and admission.  He is not hypoxic or in respiratory distress.  Cardiac workup negative. CBC without leukocytosis.  Lactic is elevated at 2.8.  Blood cultures ordered.  Sepsis fluid bolus given in the ED. Vancomycin given.  Chest x-ray shows unchanged small right pleural effusion with new minimal consolidation of right lung base with possible pneumonia or aspiration.      Discussed patient with Dr. Kelly as the patient was on her service yesterday. She recommends contacting next inpatient HM. Called Dr. Dickinson who states blood cultures appear to be contaminant due to coagulase negative. With no fever, leukocytosis, stable vitals recommends discharge home as likely contaminant.    Discussed this with my attending, Dr. Beauchamp, who would like repeat lactic after IV fluids.     Per Cate Guillen RN, pt has glucose 58. She gave crackers and juice. I ordered diabetic diet tray and repeat glucose.     Repeat lactic trending down at 2.3. feel that pt is stable for discharge at this time. Will continue meds as prescribed by HM yesterday. Add tessalon for cough. Pending repeat glucose, will discharge patient with primary care follow up in 1-2 days. Return to ER for worsening symptoms or as needed  Discussed pt with Dr. Beauchamp who also evaluated pt face to face and agrees with assessment and plan.             Scribe Attestation:   Scribe #1: I performed the above scribed service and the documentation accurately describes the services I performed. I attest to the accuracy of the note.                          Clinical Impression:     1. Chest pain    2. Hip pain    3.  Elevated lactic acid level                    I, Ines Bethea PA-C, personally performed the services described in this documentation. All medical record entries made by the scribe were at my direction and in my presence. I have reviewed the chart and agree that the record reflects my personal performance and is accurate and complete.                 Ines Bethea PA-C  08/09/20 1291       Ines Bethea PA-C  08/09/20 6135

## 2020-08-09 NOTE — ED NOTES
Pt was seen in the ER yesterday for SOB. He was d/c home and called back for + Blood cultures. Symptoms today include: cough, SOB with wheezing.     Pt states he has been unable to get his medications filled since leaving. He has not taking his antibiotics or steroids.       Last IV drug use a couple months ago. Pt currently on methadone.

## 2020-08-10 ENCOUNTER — NURSE TRIAGE (OUTPATIENT)
Dept: ADMINISTRATIVE | Facility: CLINIC | Age: 55
End: 2020-08-10

## 2020-08-11 LAB
BACTERIA BLD CULT: ABNORMAL

## 2020-08-12 ENCOUNTER — HOSPITAL ENCOUNTER (INPATIENT)
Facility: HOSPITAL | Age: 55
LOS: 2 days | Discharge: HOME | DRG: 190 | End: 2020-08-14
Attending: EMERGENCY MEDICINE | Admitting: EMERGENCY MEDICINE
Payer: MEDICARE

## 2020-08-12 DIAGNOSIS — R06.00 DYSPNEA: ICD-10-CM

## 2020-08-12 DIAGNOSIS — R06.02 SOB (SHORTNESS OF BREATH): ICD-10-CM

## 2020-08-12 DIAGNOSIS — J44.1 COPD EXACERBATION: Primary | ICD-10-CM

## 2020-08-12 LAB
ALBUMIN SERPL BCP-MCNC: 3.5 G/DL (ref 3.5–5.2)
ALP SERPL-CCNC: 76 U/L (ref 55–135)
ALT SERPL W/O P-5'-P-CCNC: 14 U/L (ref 10–44)
ANION GAP SERPL CALC-SCNC: 9 MMOL/L (ref 8–16)
AST SERPL-CCNC: 18 U/L (ref 10–40)
BACTERIA BLD CULT: NORMAL
BASOPHILS # BLD AUTO: 0.02 K/UL (ref 0–0.2)
BASOPHILS NFR BLD: 0.2 % (ref 0–1.9)
BILIRUB SERPL-MCNC: 0.4 MG/DL (ref 0.1–1)
BNP SERPL-MCNC: 76 PG/ML (ref 0–99)
BUN SERPL-MCNC: 16 MG/DL (ref 6–20)
CALCIUM SERPL-MCNC: 8.6 MG/DL (ref 8.7–10.5)
CHLORIDE SERPL-SCNC: 99 MMOL/L (ref 95–110)
CO2 SERPL-SCNC: 30 MMOL/L (ref 23–29)
CREAT SERPL-MCNC: 1.2 MG/DL (ref 0.5–1.4)
DIFFERENTIAL METHOD: ABNORMAL
EOSINOPHIL # BLD AUTO: 0.9 K/UL (ref 0–0.5)
EOSINOPHIL NFR BLD: 9.8 % (ref 0–8)
ERYTHROCYTE [DISTWIDTH] IN BLOOD BY AUTOMATED COUNT: 15 % (ref 11.5–14.5)
EST. GFR  (AFRICAN AMERICAN): >60 ML/MIN/1.73 M^2
EST. GFR  (NON AFRICAN AMERICAN): >60 ML/MIN/1.73 M^2
GLUCOSE SERPL-MCNC: 237 MG/DL (ref 70–110)
HCT VFR BLD AUTO: 39.1 % (ref 40–54)
HGB BLD-MCNC: 12.4 G/DL (ref 14–18)
IMM GRANULOCYTES # BLD AUTO: 0.04 K/UL (ref 0–0.04)
IMM GRANULOCYTES NFR BLD AUTO: 0.4 % (ref 0–0.5)
LACTATE SERPL-SCNC: 1.9 MMOL/L (ref 0.5–2.2)
LYMPHOCYTES # BLD AUTO: 1.9 K/UL (ref 1–4.8)
LYMPHOCYTES NFR BLD: 21.2 % (ref 18–48)
MCH RBC QN AUTO: 26.7 PG (ref 27–31)
MCHC RBC AUTO-ENTMCNC: 31.7 G/DL (ref 32–36)
MCV RBC AUTO: 84 FL (ref 82–98)
MONOCYTES # BLD AUTO: 0.8 K/UL (ref 0.3–1)
MONOCYTES NFR BLD: 8.3 % (ref 4–15)
NEUTROPHILS # BLD AUTO: 5.5 K/UL (ref 1.8–7.7)
NEUTROPHILS NFR BLD: 60.1 % (ref 38–73)
NRBC BLD-RTO: 0 /100 WBC
PLATELET # BLD AUTO: 302 K/UL (ref 150–350)
PMV BLD AUTO: 10.3 FL (ref 9.2–12.9)
POCT GLUCOSE: 276 MG/DL (ref 70–110)
POCT GLUCOSE: 462 MG/DL (ref 70–110)
POCT GLUCOSE: >500 MG/DL (ref 70–110)
POTASSIUM SERPL-SCNC: 4.6 MMOL/L (ref 3.5–5.1)
PROT SERPL-MCNC: 7.4 G/DL (ref 6–8.4)
RBC # BLD AUTO: 4.64 M/UL (ref 4.6–6.2)
SARS-COV-2 RDRP RESP QL NAA+PROBE: NEGATIVE
SODIUM SERPL-SCNC: 138 MMOL/L (ref 136–145)
TROPONIN I SERPL DL<=0.01 NG/ML-MCNC: 0.03 NG/ML (ref 0–0.03)
WBC # BLD AUTO: 9.07 K/UL (ref 3.9–12.7)

## 2020-08-12 PROCEDURE — 93010 ELECTROCARDIOGRAM REPORT: CPT | Mod: ,,, | Performed by: INTERNAL MEDICINE

## 2020-08-12 PROCEDURE — 80053 COMPREHEN METABOLIC PANEL: CPT

## 2020-08-12 PROCEDURE — U0002 COVID-19 LAB TEST NON-CDC: HCPCS

## 2020-08-12 PROCEDURE — 96365 THER/PROPH/DIAG IV INF INIT: CPT

## 2020-08-12 PROCEDURE — 94640 AIRWAY INHALATION TREATMENT: CPT

## 2020-08-12 PROCEDURE — C9399 UNCLASSIFIED DRUGS OR BIOLOG: HCPCS | Performed by: INTERNAL MEDICINE

## 2020-08-12 PROCEDURE — 84484 ASSAY OF TROPONIN QUANT: CPT

## 2020-08-12 PROCEDURE — 83605 ASSAY OF LACTIC ACID: CPT

## 2020-08-12 PROCEDURE — 94761 N-INVAS EAR/PLS OXIMETRY MLT: CPT

## 2020-08-12 PROCEDURE — 27000221 HC OXYGEN, UP TO 24 HOURS

## 2020-08-12 PROCEDURE — 93010 EKG 12-LEAD: ICD-10-PCS | Mod: ,,, | Performed by: INTERNAL MEDICINE

## 2020-08-12 PROCEDURE — 21400001 HC TELEMETRY ROOM

## 2020-08-12 PROCEDURE — 63600175 PHARM REV CODE 636 W HCPCS: Performed by: EMERGENCY MEDICINE

## 2020-08-12 PROCEDURE — 63600175 PHARM REV CODE 636 W HCPCS: Performed by: INTERNAL MEDICINE

## 2020-08-12 PROCEDURE — 96375 TX/PRO/DX INJ NEW DRUG ADDON: CPT

## 2020-08-12 PROCEDURE — 25000003 PHARM REV CODE 250: Performed by: EMERGENCY MEDICINE

## 2020-08-12 PROCEDURE — 82962 GLUCOSE BLOOD TEST: CPT

## 2020-08-12 PROCEDURE — 25500020 PHARM REV CODE 255: Performed by: EMERGENCY MEDICINE

## 2020-08-12 PROCEDURE — 87040 BLOOD CULTURE FOR BACTERIA: CPT | Mod: 59

## 2020-08-12 PROCEDURE — 99285 EMERGENCY DEPT VISIT HI MDM: CPT | Mod: 25

## 2020-08-12 PROCEDURE — 25000242 PHARM REV CODE 250 ALT 637 W/ HCPCS: Performed by: EMERGENCY MEDICINE

## 2020-08-12 PROCEDURE — 85025 COMPLETE CBC W/AUTO DIFF WBC: CPT

## 2020-08-12 PROCEDURE — 83880 ASSAY OF NATRIURETIC PEPTIDE: CPT

## 2020-08-12 PROCEDURE — 25000003 PHARM REV CODE 250: Performed by: INTERNAL MEDICINE

## 2020-08-12 PROCEDURE — 93005 ELECTROCARDIOGRAM TRACING: CPT

## 2020-08-12 PROCEDURE — 25000242 PHARM REV CODE 250 ALT 637 W/ HCPCS: Performed by: INTERNAL MEDICINE

## 2020-08-12 RX ORDER — ALBUTEROL SULFATE 2.5 MG/.5ML
2.5 SOLUTION RESPIRATORY (INHALATION) EVERY 4 HOURS PRN
Status: DISCONTINUED | OUTPATIENT
Start: 2020-08-12 | End: 2020-08-14 | Stop reason: HOSPADM

## 2020-08-12 RX ORDER — ASPIRIN 325 MG
325 TABLET, DELAYED RELEASE (ENTERIC COATED) ORAL DAILY
Status: DISCONTINUED | OUTPATIENT
Start: 2020-08-12 | End: 2020-08-14 | Stop reason: HOSPADM

## 2020-08-12 RX ORDER — IPRATROPIUM BROMIDE AND ALBUTEROL SULFATE 2.5; .5 MG/3ML; MG/3ML
3 SOLUTION RESPIRATORY (INHALATION)
Status: COMPLETED | OUTPATIENT
Start: 2020-08-12 | End: 2020-08-12

## 2020-08-12 RX ORDER — ENOXAPARIN SODIUM 100 MG/ML
40 INJECTION SUBCUTANEOUS EVERY 24 HOURS
Status: DISCONTINUED | OUTPATIENT
Start: 2020-08-12 | End: 2020-08-14 | Stop reason: HOSPADM

## 2020-08-12 RX ORDER — METHYLPREDNISOLONE SOD SUCC 125 MG
125 VIAL (EA) INJECTION EVERY 8 HOURS
Status: DISCONTINUED | OUTPATIENT
Start: 2020-08-12 | End: 2020-08-13

## 2020-08-12 RX ORDER — DULOXETIN HYDROCHLORIDE 30 MG/1
60 CAPSULE, DELAYED RELEASE ORAL DAILY
Status: DISCONTINUED | OUTPATIENT
Start: 2020-08-12 | End: 2020-08-14 | Stop reason: HOSPADM

## 2020-08-12 RX ORDER — TALC
6 POWDER (GRAM) TOPICAL NIGHTLY PRN
Status: DISCONTINUED | OUTPATIENT
Start: 2020-08-12 | End: 2020-08-14 | Stop reason: HOSPADM

## 2020-08-12 RX ORDER — METHYLPREDNISOLONE SOD SUCC 125 MG
125 VIAL (EA) INJECTION EVERY 12 HOURS
Status: DISCONTINUED | OUTPATIENT
Start: 2020-08-12 | End: 2020-08-12 | Stop reason: SDUPTHER

## 2020-08-12 RX ORDER — INSULIN ASPART 100 [IU]/ML
10 INJECTION, SOLUTION INTRAVENOUS; SUBCUTANEOUS ONCE
Status: COMPLETED | OUTPATIENT
Start: 2020-08-12 | End: 2020-08-12

## 2020-08-12 RX ORDER — DOXYCYCLINE HYCLATE 100 MG
100 TABLET ORAL EVERY 12 HOURS
Status: DISCONTINUED | OUTPATIENT
Start: 2020-08-12 | End: 2020-08-14 | Stop reason: HOSPADM

## 2020-08-12 RX ORDER — ALBUTEROL SULFATE 2.5 MG/.5ML
2.5 SOLUTION RESPIRATORY (INHALATION) EVERY 4 HOURS PRN
Status: DISCONTINUED | OUTPATIENT
Start: 2020-08-12 | End: 2020-08-12 | Stop reason: SDUPTHER

## 2020-08-12 RX ORDER — ACETAMINOPHEN 325 MG/1
325 TABLET ORAL EVERY 4 HOURS PRN
Status: DISCONTINUED | OUTPATIENT
Start: 2020-08-12 | End: 2020-08-14 | Stop reason: HOSPADM

## 2020-08-12 RX ORDER — HYDROXYZINE HYDROCHLORIDE 25 MG/1
25 TABLET, FILM COATED ORAL 3 TIMES DAILY PRN
Status: DISCONTINUED | OUTPATIENT
Start: 2020-08-12 | End: 2020-08-14 | Stop reason: HOSPADM

## 2020-08-12 RX ORDER — ATORVASTATIN CALCIUM 10 MG/1
20 TABLET, FILM COATED ORAL DAILY
Status: DISCONTINUED | OUTPATIENT
Start: 2020-08-12 | End: 2020-08-14 | Stop reason: HOSPADM

## 2020-08-12 RX ORDER — METHYLPREDNISOLONE SOD SUCC 125 MG
125 VIAL (EA) INJECTION
Status: COMPLETED | OUTPATIENT
Start: 2020-08-12 | End: 2020-08-12

## 2020-08-12 RX ORDER — METHADONE HYDROCHLORIDE 10 MG/1
20 TABLET ORAL DAILY
Status: DISCONTINUED | OUTPATIENT
Start: 2020-08-13 | End: 2020-08-14 | Stop reason: HOSPADM

## 2020-08-12 RX ORDER — INSULIN ASPART 100 [IU]/ML
10 INJECTION, SUSPENSION SUBCUTANEOUS ONCE
Status: DISCONTINUED | OUTPATIENT
Start: 2020-08-12 | End: 2020-08-12

## 2020-08-12 RX ORDER — ONDANSETRON 8 MG/1
8 TABLET, ORALLY DISINTEGRATING ORAL EVERY 6 HOURS PRN
Status: DISCONTINUED | OUTPATIENT
Start: 2020-08-12 | End: 2020-08-14 | Stop reason: HOSPADM

## 2020-08-12 RX ORDER — QUETIAPINE FUMARATE 25 MG/1
50 TABLET, FILM COATED ORAL NIGHTLY
Status: DISCONTINUED | OUTPATIENT
Start: 2020-08-12 | End: 2020-08-14 | Stop reason: HOSPADM

## 2020-08-12 RX ORDER — DIVALPROEX SODIUM 125 MG/1
125 TABLET, DELAYED RELEASE ORAL EVERY 8 HOURS
Status: DISCONTINUED | OUTPATIENT
Start: 2020-08-12 | End: 2020-08-14 | Stop reason: HOSPADM

## 2020-08-12 RX ORDER — GABAPENTIN 300 MG/1
600 CAPSULE ORAL 3 TIMES DAILY
Status: DISCONTINUED | OUTPATIENT
Start: 2020-08-12 | End: 2020-08-14 | Stop reason: HOSPADM

## 2020-08-12 RX ORDER — INSULIN ASPART 100 [IU]/ML
10 INJECTION, SOLUTION INTRAVENOUS; SUBCUTANEOUS
Status: DISCONTINUED | OUTPATIENT
Start: 2020-08-12 | End: 2020-08-13

## 2020-08-12 RX ORDER — DOCUSATE SODIUM 100 MG/1
100 CAPSULE, LIQUID FILLED ORAL 2 TIMES DAILY PRN
Status: DISCONTINUED | OUTPATIENT
Start: 2020-08-12 | End: 2020-08-14 | Stop reason: HOSPADM

## 2020-08-12 RX ADMIN — ATORVASTATIN CALCIUM 20 MG: 10 TABLET, FILM COATED ORAL at 02:08

## 2020-08-12 RX ADMIN — TRAZODONE HYDROCHLORIDE 25 MG: 50 TABLET ORAL at 10:08

## 2020-08-12 RX ADMIN — INSULIN ASPART 10 UNITS: 100 INJECTION, SOLUTION INTRAVENOUS; SUBCUTANEOUS at 05:08

## 2020-08-12 RX ADMIN — METHYLPREDNISOLONE SODIUM SUCCINATE 125 MG: 125 INJECTION, POWDER, FOR SOLUTION INTRAMUSCULAR; INTRAVENOUS at 10:08

## 2020-08-12 RX ADMIN — HYDROXYZINE HYDROCHLORIDE 25 MG: 25 TABLET, FILM COATED ORAL at 02:08

## 2020-08-12 RX ADMIN — DIVALPROEX SODIUM 125 MG: 125 TABLET, DELAYED RELEASE ORAL at 02:08

## 2020-08-12 RX ADMIN — ASPIRIN 325 MG: 325 TABLET, DELAYED RELEASE ORAL at 02:08

## 2020-08-12 RX ADMIN — AZITHROMYCIN 500 MG: 500 INJECTION, POWDER, LYOPHILIZED, FOR SOLUTION INTRAVENOUS at 10:08

## 2020-08-12 RX ADMIN — DIVALPROEX SODIUM 125 MG: 125 TABLET, DELAYED RELEASE ORAL at 10:08

## 2020-08-12 RX ADMIN — INSULIN DETEMIR 20 UNITS: 100 INJECTION, SOLUTION SUBCUTANEOUS at 02:08

## 2020-08-12 RX ADMIN — HUMAN INSULIN 40 UNITS: 100 INJECTION, SUSPENSION SUBCUTANEOUS at 10:08

## 2020-08-12 RX ADMIN — DULOXETINE HYDROCHLORIDE 60 MG: 30 CAPSULE, DELAYED RELEASE ORAL at 02:08

## 2020-08-12 RX ADMIN — ALBUTEROL SULFATE 2.5 MG: 2.5 SOLUTION RESPIRATORY (INHALATION) at 03:08

## 2020-08-12 RX ADMIN — IPRATROPIUM BROMIDE AND ALBUTEROL SULFATE 3 ML: .5; 3 SOLUTION RESPIRATORY (INHALATION) at 07:08

## 2020-08-12 RX ADMIN — IPRATROPIUM BROMIDE AND ALBUTEROL SULFATE 3 ML: .5; 3 SOLUTION RESPIRATORY (INHALATION) at 10:08

## 2020-08-12 RX ADMIN — METHYLPREDNISOLONE SODIUM SUCCINATE 125 MG: 125 INJECTION, POWDER, FOR SOLUTION INTRAMUSCULAR; INTRAVENOUS at 02:08

## 2020-08-12 RX ADMIN — DOXYCYCLINE HYCLATE 100 MG: 100 TABLET, COATED ORAL at 02:08

## 2020-08-12 RX ADMIN — IOHEXOL 75 ML: 350 INJECTION, SOLUTION INTRAVENOUS at 12:08

## 2020-08-12 RX ADMIN — DOXYCYCLINE HYCLATE 100 MG: 100 TABLET, COATED ORAL at 10:08

## 2020-08-12 RX ADMIN — QUETIAPINE FUMARATE 50 MG: 25 TABLET ORAL at 10:08

## 2020-08-12 RX ADMIN — ENOXAPARIN SODIUM 40 MG: 40 INJECTION SUBCUTANEOUS at 05:08

## 2020-08-12 RX ADMIN — INSULIN DETEMIR 25 UNITS: 100 INJECTION, SOLUTION SUBCUTANEOUS at 05:08

## 2020-08-12 NOTE — ASSESSMENT & PLAN NOTE
Will start steroids and doxy. Def. Wheezing on exam.  Non-compliance with meds as noted. No respiratory distress or failure. 88% on RA with activity. Expect short stay. Discharge no later than Thursday.

## 2020-08-12 NOTE — SUBJECTIVE & OBJECTIVE
Past Medical History:   Diagnosis Date    Acute on chronic pancreatitis 4/3/2016    DKA (diabetic ketoacidoses) 02/2020    DM (diabetes mellitus), type 1     HCV (hepatitis C virus)     high VL     HTN (hypertension)     Hypomagnesemia 5/25/2020    IVDU (intravenous drug user)     Heroin    Pancreatitis        Past Surgical History:   Procedure Laterality Date    CARDIAC SURGERY  1999    stent placed. ([facility])    ESOPHAGOGASTRODUODENOSCOPY N/A 3/5/2020    Gastritis.  No H pylori.  Completed PPI for 1 month.  Procedure: EGD (ESOPHAGOGASTRODUODENOSCOPY);  Surgeon: Brinda Rene MD;  [facility];  Service: Endoscopy;  Laterality: N/A;  W421 A; [id number]    SHOULDER SURGERY  2013    right shoulder, spur removal.       Review of patient's allergies indicates:  No Known Allergies    No current facility-administered medications on file prior to encounter.      Current Outpatient Medications on File Prior to Encounter   Medication Sig    albuterol (PROVENTIL/VENTOLIN HFA) 90 mcg/actuation inhaler Inhale 1-2 puffs into the lungs every 6 (six) hours as needed for Wheezing or Shortness of Breath. Rescue    albuterol-ipratropium (DUO-NEB) 2.5 mg-0.5 mg/3 mL nebulizer solution Take 3 mLs by nebulization every 4 (four) hours as needed for Wheezing or Shortness of Breath. Rescue    aspirin (ECOTRIN) 325 MG EC tablet Take 1 tablet (325 mg total) by mouth once daily.    azithromycin (Z-IRASEMA) 250 MG tablet Take 1 tablet (250 mg total) by mouth once daily. for 3 days    benzonatate (TESSALON) 100 MG capsule Take 1 capsule (100 mg total) by mouth 3 (three) times daily as needed for Cough.    insulin lispro 100 unit/mL injection Inject 10 Units into the skin 3 (three) times daily before meals.    insulin  unit/mL injection Inject 30 Units into the skin every evening.    predniSONE (DELTASONE) 20 MG tablet Take 1 tablet (20 mg total) by mouth once daily. for 3 days    acetaminophen (TYLENOL)  650 MG TbSR Take 650 mg by mouth every 8 (eight) hours as needed.    ASCORBIC ACID, VITAMIN C, ORAL Take 1 tablet by mouth once daily.    atorvastatin (LIPITOR) 20 MG tablet Take 20 mg by mouth once daily.    b complex vitamins tablet Take 1 tablet by mouth once daily.    bisacodyL (DULCOLAX) 10 mg Supp Place 10 mg rectally daily as needed.    blood sugar diagnostic Strp Use To check blood glucose three times daily,    blood-glucose meter Misc To check Blood glucose three times daily,    calcium carbonate (TUMS) 200 mg calcium (500 mg) chewable tablet Take 1 tablet (500 mg total) by mouth daily as needed for Heartburn.    diphenhydrAMINE (BENADRYL) 25 mg capsule Take 25 mg by mouth every 6 (six) hours as needed for Itching.    divalproex (DEPAKOTE) 125 MG EC tablet Take 125 mg by mouth every 8 (eight) hours.    docusate sodium (COLACE) 100 MG capsule Take 1 capsule (100 mg total) by mouth 2 (two) times daily as needed for Constipation.    DULoxetine (CYMBALTA) 60 MG capsule Take 1 capsule (60 mg total) by mouth once daily.    folic acid (FOLVITE) 800 MCG Tab Take 800 mcg by mouth once daily.    folic acid-vit B6-vit B12 2.5-25-2 mg (FOLBIC OR EQUIV) 2.5-25-2 mg Tab Take 1 tablet by mouth once daily.    gabapentin (NEURONTIN) 300 MG capsule Take 2 capsules (600 mg total) by mouth 3 (three) times daily.    glucagon, human recombinant, (GLUCAGEN HYPOKIT) 1 mg SolR Inject 1 mg into the muscle as needed.    glucose 4 GM chewable tablet Take 16 g by mouth as needed for Low blood sugar.    HALOPERIDOL LACTATE INJ Inject 1 mg as directed every 4 (four) hours as needed.    hydroxyzine HCL (ATARAX) 25 MG tablet Take 1 tablet (25 mg total) by mouth 3 (three) times daily as needed for Anxiety.    insulin aspart U-100 (NOVOLOG) 100 unit/mL injection Inject 1-10 Units into the skin 3 (three) times daily with meals.    L. acidophilus/Strept/La p-whitney (RISAQUAD ORAL) Take 1 capsule by mouth once daily.     lancets Misc To check BG 3 times daily, to use with insurance preferred meter    lidocaine (LIDODERM) 5 % Place 1 patch onto the skin once daily. Remove & Discard patch within 12 hours or as directed by MD.  Apply to shoulder for pain    lidocaine (LIDODERM) 5 % Place 1 patch onto the skin daily as needed. Remove & Discard patch within 12 hours or as directed by MD.  Apply to back for pain    melatonin (MELATIN) 3 mg tablet Take 2 tablets (6 mg total) by mouth nightly as needed for Insomnia.    mineral oil (FLEET OIL RETENTION) enema Place 1 enema rectally daily as needed for Constipation.    multivitamin (THERAGRAN) per tablet Take 1 tablet by mouth once daily.    naloxone (NARCAN) 4 mg/actuation Spry 4mg by nasal route as needed for opioid overdose; may repeat every 2-3 minutes in alternating nostrils until medical help arrives. Call 911    ondansetron (ZOFRAN) 2 mg/mL injection Inject 4 mg into the vein every 6 (six) hours as needed.    ondansetron (ZOFRAN-ODT) 8 MG TbDL Take 1 tablet (8 mg total) by mouth every 8 (eight) hours as needed.    polyethylene glycol (GLYCOLAX) 17 gram PwPk Take 17 g by mouth daily as needed (Constipation). (Patient taking differently: Take 17 g by mouth 3 (three) times daily as needed (Constipation). )    QUEtiapine (SEROQUEL) 50 MG tablet Take 50 mg by mouth every evening.    traZODone (DESYREL) 50 MG tablet Take 25 mg by mouth every evening.     Family History     Problem Relation (Age of Onset)    Asthma Mother        Tobacco Use    Smoking status: Former Smoker     Packs/day: 1.00     Years: 20.00     Pack years: 20.00     Types: Cigarettes     Start date: 4/3/1981     Quit date: 2020     Years since quittin.1    Smokeless tobacco: Never Used   Substance and Sexual Activity    Alcohol use: No     Alcohol/week: 0.0 standard drinks    Drug use: Not Currently     Types: IV, Marijuana, Heroin     Comment: currently on methadone     Sexual activity: Yes      Partners: Female     Review of Systems   Constitutional: Negative for activity change, appetite change, chills, diaphoresis and fatigue.   HENT: Negative for congestion and dental problem.    Respiratory: Positive for shortness of breath and wheezing. Negative for cough, choking, chest tightness and stridor.    Cardiovascular: Negative for chest pain, palpitations and leg swelling.   Gastrointestinal: Negative for abdominal pain, constipation, diarrhea, nausea and vomiting.   Genitourinary: Negative for difficulty urinating.   Musculoskeletal: Negative for arthralgias and back pain.   Neurological: Negative for dizziness.   Psychiatric/Behavioral: Negative for agitation and behavioral problems.     Objective:     Vital Signs (Most Recent):  Temp: 97.6 °F (36.4 °C) (08/12/20 0719)  Pulse: (!) 114 (08/12/20 1045)  Resp: (!) 26 (08/12/20 1045)  BP: 115/63 (08/12/20 0916)  SpO2: 98 % (08/12/20 1045) Vital Signs (24h Range):  Temp:  [97.6 °F (36.4 °C)] 97.6 °F (36.4 °C)  Pulse:  [] 114  Resp:  [14-28] 26  SpO2:  [89 %-100 %] 98 %  BP: ()/(58-86) 115/63     Weight: 68 kg (150 lb)  Body mass index is 23.49 kg/m².    Physical Exam  Vitals signs and nursing note reviewed.   Constitutional:       General: He is not in acute distress.     Appearance: Normal appearance. He is normal weight. He is not ill-appearing, toxic-appearing or diaphoretic.   HENT:      Head: Normocephalic and atraumatic.   Cardiovascular:      Rate and Rhythm: Normal rate and regular rhythm.      Heart sounds: No murmur. No gallop.    Pulmonary:      Effort: Pulmonary effort is normal. No respiratory distress.      Breath sounds: No stridor. Wheezing present. No rhonchi or rales.   Chest:      Chest wall: No tenderness.   Abdominal:      General: Abdomen is flat. Bowel sounds are normal. There is no distension.      Tenderness: There is no abdominal tenderness.   Skin:     General: Skin is warm and dry.   Neurological:      Mental Status: He  is alert and oriented to person, place, and time.   Psychiatric:         Mood and Affect: Mood normal.         Behavior: Behavior normal.             Significant Labs:   BMP:   Recent Labs   Lab 08/12/20  0842   *      K 4.6   CL 99   CO2 30*   BUN 16   CREATININE 1.2   CALCIUM 8.6*     CBC:   Recent Labs   Lab 08/12/20  0734   WBC 9.07   HGB 12.4*   HCT 39.1*          Significant Imaging:   CXR- nothing acute. Small R effusion

## 2020-08-12 NOTE — ED NOTES
Pt reports worsening SOB and tightness, sats 98-96%. MD notified. Placed on 3L NC. New orders received. Resp therapist called for breathing tx.

## 2020-08-12 NOTE — ED NOTES
Pt to ED via EMS from home reporting acute SOB this morning when he got up to use restroom. Recently diagnosed with pneumonia 4 days ago and has hx COPD. Pt reports does not take prescribed steroids because they make his sugar go up and he does not want to take extra insulin, also states he did not take prescribed abx for pneumonia because he didn't like the way they made him feel. EMS reports pt was initially in resp distress with sats 80%, placed on CPAP with improvement. Pt tachypnic with wheezing noted, 100%. Placed on 3L NC, maintaining sats %. States marked improvement of SOB.

## 2020-08-12 NOTE — ED PROVIDER NOTES
Encounter Date: 8/12/2020    SCRIBE #1 NOTE: I, Luli Chavez, am scribing for, and in the presence of,  Jacek Cheng MD. I have scribed the following portions of the note - Other sections scribed: HPI, ROS, PE.       History     Chief Complaint   Patient presents with    Shortness of Breath     EMS reports pt c/o worsening shortness of breath x 1 hour ago. initial O2 sats of 80%. placed on CPAP with improvement.      Mr. Cali Mabry is a 54 y.o. male with a PMHx of COPD who presents to the ED complaining of SOB and wheezing that worsened this morning. Per EMS, the patient was tripoding and had an oxygen saturation in the low 80's. Patient was placed on CPAP with improvement. EMS states that the patient used his inhaler for treatment prior to arrival. The patient has had multiple ED visits in the last 2 weeks and was diagnosed with Pneumonia. He reports that he quit taking the steroids prescribed to him because it made his blood sugar so high that his meter couldn't read it. He notes that his last dose of steroids was 2-3 days ago. He adds that he also quit taking Azithromycin. He states that he feels almost normal now. He notes that he quit smoking 1-2 months ago. He denies using oxygen at home.     The history is provided by the patient and the EMS personnel. No  was used.     Review of patient's allergies indicates:  No Known Allergies  Past Medical History:   Diagnosis Date    Acute on chronic pancreatitis 4/3/2016    DKA (diabetic ketoacidoses) 02/2020    DM (diabetes mellitus), type 1     HCV (hepatitis C virus)     high VL     HTN (hypertension)     Hypomagnesemia 5/25/2020    IVDU (intravenous drug user)     Heroin    Pancreatitis      Past Surgical History:   Procedure Laterality Date    CARDIAC SURGERY  1999    stent placed. ([facility])    ESOPHAGOGASTRODUODENOSCOPY N/A 3/5/2020    Gastritis.  No H pylori.  Completed PPI for 1 month.  Procedure: EGD  (ESOPHAGOGASTRODUODENOSCOPY);  Surgeon: Brinda Rene MD;  Location: [facility];  Service: Endoscopy;  Laterality: N/A;  W421 A; [phone]    SHOULDER SURGERY  2013    right shoulder, spur removal.     Family History   Problem Relation Age of Onset    Asthma Mother      Social History     Tobacco Use    Smoking status: Former Smoker     Packs/day: 1.00     Years: 20.00     Pack years: 20.00     Types: Cigarettes     Start date: 4/3/1981     Quit date: 2020     Years since quittin.2    Smokeless tobacco: Never Used   Substance Use Topics    Alcohol use: No     Alcohol/week: 0.0 standard drinks    Drug use: Not Currently     Types: IV, Marijuana, Heroin     Comment: currently on methadone      Review of Systems   Respiratory: Positive for shortness of breath and wheezing.    All other systems reviewed and are negative.      Physical Exam     Initial Vitals   BP Pulse Resp Temp SpO2   20 0716 20 0716 20 0716 20 0719 20 0716   (!) 160/86 108 (!) 28 97.6 °F (36.4 °C) 100 %      MAP       --                Physical Exam    Nursing note and vitals reviewed.  Constitutional: He appears well-developed and well-nourished. No distress.   HENT:   Head: Normocephalic and atraumatic.   Right Ear: Tympanic membrane normal.   Left Ear: Tympanic membrane normal.   Nose: Nose normal.   Mouth/Throat: Uvula is midline, oropharynx is clear and moist and mucous membranes are normal.   Eyes: EOM are normal. Pupils are equal, round, and reactive to light.   Neck: Trachea normal, normal range of motion, full passive range of motion without pain and phonation normal. Neck supple. No stridor present. No spinous process tenderness and no muscular tenderness present. Normal range of motion present. No neck rigidity.   Cardiovascular: Normal rate, regular rhythm and normal heart sounds. Exam reveals no gallop and no friction rub.    No murmur heard.  Pulmonary/Chest: He has no rhonchi. He has no rales.    Mild respiratory distress. Speaking in full sentences through mildly labored breathing. Using accessory  muscles. Decreased air movement throughout with mild expiratory wheezes to the bilateral bases.    Abdominal: Soft. Bowel sounds are normal. He exhibits no mass. There is no abdominal tenderness. There is no rebound and no guarding.   Musculoskeletal: Normal range of motion.      Comments: Extremities are thin with good perfusion and no edema.   Neurological: He is alert and oriented to person, place, and time. He has normal strength. No cranial nerve deficit or sensory deficit.   Skin: Skin is warm and dry. Capillary refill takes less than 2 seconds.   Psychiatric: He has a normal mood and affect.         ED Course   Critical Care    Date/Time: 8/13/2020 10:56 AM  Performed by: Jacek Cheng MD  Authorized by: Tyrese Dickinson MD   Direct patient critical care time: 10 minutes  Additional history critical care time: 5 minutes  Ordering / reviewing critical care time: 10 minutes  Documentation critical care time: 5 minutes  Consulting other physicians critical care time: 5 minutes  Total critical care time (exclusive of procedural time) : 35 minutes        Labs Reviewed   CBC W/ AUTO DIFFERENTIAL - Abnormal; Notable for the following components:       Result Value    Hemoglobin 12.4 (*)     Hematocrit 39.1 (*)     Mean Corpuscular Hemoglobin 26.7 (*)     Mean Corpuscular Hemoglobin Conc 31.7 (*)     RDW 15.0 (*)     Eos # 0.9 (*)     Eosinophil% 9.8 (*)     All other components within normal limits   COMPREHENSIVE METABOLIC PANEL - Abnormal; Notable for the following components:    CO2 30 (*)     Glucose 237 (*)     Calcium 8.6 (*)     All other components within normal limits    Narrative:     Recoll. [id number] by [facility] at 08/12/2020 08:33, reason: Specimen   hemolyzed.  Tube has been refrigerated.  Called Julia 08/12/2020  08:33   TROPONIN I - Abnormal; Notable for the following components:     Troponin I 0.027 (*)     All other components within normal limits   POCT GLUCOSE - Abnormal; Notable for the following components:    POCT Glucose 276 (*)     All other components within normal limits   SARS-COV-2 RNA AMPLIFICATION, QUAL   LACTIC ACID, PLASMA   B-TYPE NATRIURETIC PEPTIDE   HEMOGLOBIN A1C          Imaging Results           CTA Chest Non-Coronary (PE Study) (Final result)  Result time 08/12/20 13:51:19    Final result by Davide Hunter MD (08/12/20 13:51:19)                 Impression:      1. Technically limited study with suboptimal evaluation for pulmonary thromboembolism as detailed above.  Within this limitation, no saddle embolus, pulmonary infarction or convincing evidence of pulmonary thromboembolism through the proximal segmental levels.  Cannot exclude distal segmental or subsegmental pulmonary thromboemboli.  Further evaluation/follow-up as warranted.  2. Chronic bilateral diffuse bronchiectasis with slight interval increased mild degree of nonspecific peribronchial thickening which can be seen with small vessels disease including pulmonary edema or small airways disease.  3. Interval resolution of previous cavitary area of consolidation within the right lower lobe, now with residual minimal associated pleuroparenchymal thickening and suspected to residual closely adjacent cystic structures without associated air-fluid level or significant wall thickening to suggest recurrence or new pulmonary abscess.  4. Near-total resolution of previous small area of consolidation within the left lower lobe.  5. Interval new 7 mm solid pulmonary nodule within the left lower lobe.  For a solid nodule 6-8 mm, Fleischner Society 2017 guidelines recommend considering follow up with non-contrast chest CT at 6-12 months and 18-24 months after discovery in a low risk patient.  In a high risk patient, guidelines recommend definite follow-up non-contrast chest CT at 6-12 and 18-24 months after discovery.  6.  Grossly similar size and distribution of remaining multiple bilateral pulmonary nodules.  7. Pulmonary emphysema and coronary artery disease.  This report was flagged in Epic as abnormal.      Electronically signed by: Davide Hunter MD  Date:    08/12/2020  Time:    13:51             Narrative:    EXAMINATION:  CTA CHEST NON CORONARY    CLINICAL HISTORY:  PE suspected, high pretest prob;    TECHNIQUE:  Low dose axial images, sagittal and coronal reformations were obtained from the thoracic inlet to the lung bases following the IV administration of 80 mL of Omnipaque 350.  Contrast timing was optimized to evaluate the pulmonary arteries.  MIP images were performed.    COMPARISON:  Chest radiograph earlier same day and CT thorax most recent 05/09/2020    FINDINGS:  Base of Neck: No significant abnormality.    Thoracic soft tissues: Unchanged noting slight asymmetric more prominent subareolar soft tissue density on the left and lipoma within the subcutaneous soft tissues of the anterolateral mid left chest wall.    Aorta: Left-sided aortic arch.  Minimal scattered calcific atherosclerosis of the aorta without aneurysm or dissection seen.    Heart: Normal size. No effusion.  Coronary arterial calcifications noted.  No cardiac thrombus seen.    Pulmonary vasculature: Suboptimal timing of contrast bolus with greater opacification in the systemic and pulmonary venous circulation and poor opacification of the pulmonary arterial circulation.  Pulmonary trunk is normal in size.  No saddle embolus or definite filling defect to the proximal segmental levels.  Cannot exclude distal segmental or subsegmental pulmonary emboli on the basis of this examination.  No pulmonary infarction.  Pulmonary arteries distribute normally.  There are four pulmonary veins.    Lexy/Mediastinum: No pathologic jojo enlargement.    Airways: Trachea is midline.  There is small amount of dependent secretions along the right aspect of the upper trachea.   Trachea is otherwise patent.  There is bilateral diffuse bronchiectasis with slight interval increased mild degree of nonspecific peribronchial thickening.    Lungs/Pleura: There is pulmonary hyperinflation with upper lobe predominant centrilobular emphysema.  There is mild elevation of the right hemidiaphragm with interval pleural thickening/scarring along the lateral right lung base/costophrenic angle.  Interval resolution of previous cavitary type consolidation within the posterior and medial right lower lobe, now with 2 residual thin walled closely adjacent cystic structures without associated air-fluid level.  The degree of associated pleuroparenchymal scarring and peribronchovascular thickening has decreased, now minimal.  Additionally, within the previous area of cavitary consolidation there is now bandlike opacities consistent with platelike scarring versus atelectasis.  Additional scattered bandlike opacities throughout both lungs consistent with platelike scarring versus atelectasis.  Unchanged simple appearing pulmonary cysts within the anterior right upper lobe.  Previous small opacity with air bronchogram in the anterior aspect of the left lower lobe has significantly decreased in size with residual 8 mm slightly irregular nodular component.  Within the lateral aspect of the left lower lobe there is a new 7 mm solid nodule.  Grossly similar size and distribution of additional scattered pulmonary nodules throughout both lungs.  No new large consolidation.  No pleural effusion or pneumothorax.    Esophagus: Unremarkable.    Upper Abdomen: Grossly unchanged without acute abnormality of the partially imaged upper abdomen.    Bones: No acute fracture. No suspicious lytic or sclerotic lesions.                               X-Ray Chest AP Portable (Final result)  Result time 08/12/20 09:05:51    Final result by Demar Mercedes MD (08/12/20 09:05:51)                 Impression:      1. Stable chest radiograph  when compared to the study of 08/09/2020.  Continuing small right pleural effusion.  2. No significant change in the minimal airspace opacity in the medial right lung base.      Electronically signed by: Demar Mercedes MD  Date:    08/12/2020  Time:    09:05             Narrative:    EXAMINATION:  XR CHEST AP PORTABLE    CLINICAL HISTORY:  Dyspnea, unspecified    TECHNIQUE:  Single frontal view of the chest was performed.    COMPARISON:  Chest 08/09/2020    FINDINGS:  Elevation of the right hemidiaphragm and blunting of the right costophrenic angle remains without significant change from the previous study.  There is a nodular density in the right costophrenic angle, likely representing nipple shadow.  Minimal airspace opacity in the medial aspect of the right lung base also remains without significant change from previous study.  Minor scar-like changes are seen in the left lung.  Upper lungs are clear.    Heart size is within normal limits.  Mediastinum is unremarkable.  There is no tracheal abnormality.  Cardiac monitoring leads over the chest.                                 Medical Decision Making:   Clinical Tests:   Lab Tests: Ordered and Reviewed  Radiological Study: Ordered and Reviewed  Medical Tests: Ordered and Reviewed  ED Management:  Previous records reviewed.  Patient has not yet taken his antibiotics he was prescribed within the last week.  He also adamantly refused steroids for a period of time, as he states it raises his sugar too much.  He was not taking his steroids at home.  He did get significant improvement with 1 DuoNeb and was resting comfortably.  Room air sats are about 94%.  Room air ambulatory sats however drop to 89%.  He did become anxious when getting IV placed for CT study and that resulted and some more shortness of breath and a return of his initial respiratory distress.  Additional nebulizer treatments been ordered.  He is on 2 L of oxygen at this time.  He improved after additional  nebs. He desats to upper 80s on RA when ambulating.   He needs to be placed in the hospital on oxygen for further evaluation and care. He does not use oxygen at home. He agreed to steroids after much discussion. I spoke w , Dr. Dickinson, who saw pt in ER.             Scribe Attestation:   Scribe #1: I performed the above scribed service and the documentation accurately describes the services I performed. I attest to the accuracy of the note.                          Clinical Impression:       ICD-10-CM ICD-9-CM   1. COPD exacerbation  J44.1 491.21   2. Dyspnea  R06.00 786.09   3. SOB (shortness of breath)  R06.02 786.05             ED Disposition Condition    Admit                           Jacek Cheng MD  08/13/20 1056       Jacek Cheng MD  08/13/20 1057

## 2020-08-12 NOTE — ASSESSMENT & PLAN NOTE
Has quit. I believe him per our discussion. Takes methadone here and there- wants to try without .

## 2020-08-12 NOTE — HPI
Mr. Mabry is a 53 yo Male well known to me.  He was recently hospitalized and discharged about 5 days ago with a dx of COPD.  He did not get his zithromax or prednisone. He returns to the ED today with a CC of SOB and wheezing that started this am. He did not fill his prednisone because he was afraid of elevated blood sugars.  The patient is a well known past IV drug user and takes methadone when he can get it.  He denies any fevers or chills. Non-productive cough. No Covid exposure. Non-ill appearing.

## 2020-08-12 NOTE — H&P
[facility] Medicine  History & Physical    Patient Name: Cali Mabry  MRN: [id number]  Admission Date: 8/12/2020  Attending Physician: Jacek Cheng MD   Primary Care Provider: Cullen Zimmer MD         Patient information was obtained from patient and ER records.     Subjective:     Principal Problem:COPD exacerbation    Chief Complaint:   Chief Complaint   Patient presents with    Shortness of Breath     EMS reports pt c/o worsening shortness of breath x 1 hour ago. initial O2 sats of 80%. placed on CPAP with improvement.         HPI: Mr. Mabry is a 55 yo Male well known to me.  He was recently hospitalized and discharged about 5 days ago with a dx of COPD.  He did not get his zithromax or prednisone. He returns to the ED today with a CC of SOB and wheezing that started this am. He did not fill his prednisone because he was afraid of elevated blood sugars.  The patient is a well known past IV drug user and takes methadone when he can get it.  He denies any fevers or chills. Non-productive cough. No Covid exposure. Non-ill appearing.      Past Medical History:   Diagnosis Date    Acute on chronic pancreatitis 4/3/2016    DKA (diabetic ketoacidoses) 02/2020    DM (diabetes mellitus), type 1     HCV (hepatitis C virus)     high VL     HTN (hypertension)     Hypomagnesemia 5/25/2020    IVDU (intravenous drug user)     Heroin    Pancreatitis        Past Surgical History:   Procedure Laterality Date    CARDIAC SURGERY  1999    stent placed. ([facility])    ESOPHAGOGASTRODUODENOSCOPY N/A 3/5/2020    Gastritis.  No H pylori.  Completed PPI for 1 month.  Procedure: EGD (ESOPHAGOGASTRODUODENOSCOPY);  Surgeon: Brinda Rene MD;  Location: [facility];  Service: Endoscopy;  Laterality: N/A;  [id number]; [id number]    SHOULDER SURGERY  2013    right shoulder, spur removal.       Review of patient's allergies indicates:  No Known Allergies    No current  facility-administered medications on file prior to encounter.      Current Outpatient Medications on File Prior to Encounter   Medication Sig    albuterol (PROVENTIL/VENTOLIN HFA) 90 mcg/actuation inhaler Inhale 1-2 puffs into the lungs every 6 (six) hours as needed for Wheezing or Shortness of Breath. Rescue    albuterol-ipratropium (DUO-NEB) 2.5 mg-0.5 mg/3 mL nebulizer solution Take 3 mLs by nebulization every 4 (four) hours as needed for Wheezing or Shortness of Breath. Rescue    aspirin (ECOTRIN) 325 MG EC tablet Take 1 tablet (325 mg total) by mouth once daily.    azithromycin (Z-IRASEMA) 250 MG tablet Take 1 tablet (250 mg total) by mouth once daily. for 3 days    benzonatate (TESSALON) 100 MG capsule Take 1 capsule (100 mg total) by mouth 3 (three) times daily as needed for Cough.    insulin lispro 100 unit/mL injection Inject 10 Units into the skin 3 (three) times daily before meals.    insulin  unit/mL injection Inject 30 Units into the skin every evening.    predniSONE (DELTASONE) 20 MG tablet Take 1 tablet (20 mg total) by mouth once daily. for 3 days    acetaminophen (TYLENOL) 650 MG TbSR Take 650 mg by mouth every 8 (eight) hours as needed.    ASCORBIC ACID, VITAMIN C, ORAL Take 1 tablet by mouth once daily.    atorvastatin (LIPITOR) 20 MG tablet Take 20 mg by mouth once daily.    b complex vitamins tablet Take 1 tablet by mouth once daily.    bisacodyL (DULCOLAX) 10 mg Supp Place 10 mg rectally daily as needed.    blood sugar diagnostic Strp Use To check blood glucose three times daily,    blood-glucose meter Misc To check Blood glucose three times daily,    calcium carbonate (TUMS) 200 mg calcium (500 mg) chewable tablet Take 1 tablet (500 mg total) by mouth daily as needed for Heartburn.    diphenhydrAMINE (BENADRYL) 25 mg capsule Take 25 mg by mouth every 6 (six) hours as needed for Itching.    divalproex (DEPAKOTE) 125 MG EC tablet Take 125 mg by mouth every 8 (eight) hours.     docusate sodium (COLACE) 100 MG capsule Take 1 capsule (100 mg total) by mouth 2 (two) times daily as needed for Constipation.    DULoxetine (CYMBALTA) 60 MG capsule Take 1 capsule (60 mg total) by mouth once daily.    folic acid (FOLVITE) 800 MCG Tab Take 800 mcg by mouth once daily.    folic acid-vit B6-vit B12 2.5-25-2 mg (FOLBIC OR EQUIV) 2.5-25-2 mg Tab Take 1 tablet by mouth once daily.    gabapentin (NEURONTIN) 300 MG capsule Take 2 capsules (600 mg total) by mouth 3 (three) times daily.    glucagon, human recombinant, (GLUCAGEN HYPOKIT) 1 mg SolR Inject 1 mg into the muscle as needed.    glucose 4 GM chewable tablet Take 16 g by mouth as needed for Low blood sugar.    HALOPERIDOL LACTATE INJ Inject 1 mg as directed every 4 (four) hours as needed.    hydroxyzine HCL (ATARAX) 25 MG tablet Take 1 tablet (25 mg total) by mouth 3 (three) times daily as needed for Anxiety.    insulin aspart U-100 (NOVOLOG) 100 unit/mL injection Inject 1-10 Units into the skin 3 (three) times daily with meals.    L. acidophilus/Strept/La p-whitney (RISAQUAD ORAL) Take 1 capsule by mouth once daily.    lancets Misc To check BG 3 times daily, to use with insurance preferred meter    lidocaine (LIDODERM) 5 % Place 1 patch onto the skin once daily. Remove & Discard patch within 12 hours or as directed by MD.  Apply to shoulder for pain    lidocaine (LIDODERM) 5 % Place 1 patch onto the skin daily as needed. Remove & Discard patch within 12 hours or as directed by MD.  Apply to back for pain    melatonin (MELATIN) 3 mg tablet Take 2 tablets (6 mg total) by mouth nightly as needed for Insomnia.    mineral oil (FLEET OIL RETENTION) enema Place 1 enema rectally daily as needed for Constipation.    multivitamin (THERAGRAN) per tablet Take 1 tablet by mouth once daily.    naloxone (NARCAN) 4 mg/actuation Spry 4mg by nasal route as needed for opioid overdose; may repeat every 2-3 minutes in alternating nostrils until medical  help arrives. Call 911    ondansetron (ZOFRAN) 2 mg/mL injection Inject 4 mg into the vein every 6 (six) hours as needed.    ondansetron (ZOFRAN-ODT) 8 MG TbDL Take 1 tablet (8 mg total) by mouth every 8 (eight) hours as needed.    polyethylene glycol (GLYCOLAX) 17 gram PwPk Take 17 g by mouth daily as needed (Constipation). (Patient taking differently: Take 17 g by mouth 3 (three) times daily as needed (Constipation). )    QUEtiapine (SEROQUEL) 50 MG tablet Take 50 mg by mouth every evening.    traZODone (DESYREL) 50 MG tablet Take 25 mg by mouth every evening.     Family History     Problem Relation (Age of Onset)    Asthma Mother        Tobacco Use    Smoking status: Former Smoker     Packs/day: 1.00     Years: 20.00     Pack years: 20.00     Types: Cigarettes     Start date: 4/3/1981     Quit date: 2020     Years since quittin.1    Smokeless tobacco: Never Used   Substance and Sexual Activity    Alcohol use: No     Alcohol/week: 0.0 standard drinks    Drug use: Not Currently     Types: IV, Marijuana, Heroin     Comment: currently on methadone     Sexual activity: Yes     Partners: Female     Review of Systems   Constitutional: Negative for activity change, appetite change, chills, diaphoresis and fatigue.   HENT: Negative for congestion and dental problem.    Respiratory: Positive for shortness of breath and wheezing. Negative for cough, choking, chest tightness and stridor.    Cardiovascular: Negative for chest pain, palpitations and leg swelling.   Gastrointestinal: Negative for abdominal pain, constipation, diarrhea, nausea and vomiting.   Genitourinary: Negative for difficulty urinating.   Musculoskeletal: Negative for arthralgias and back pain.   Neurological: Negative for dizziness.   Psychiatric/Behavioral: Negative for agitation and behavioral problems.     Objective:     Vital Signs (Most Recent):  Temp: 97.6 °F (36.4 °C) (20 0719)  Pulse: (!) 114 (20 1045)  Resp: (!) 26  (08/12/20 1045)  BP: 115/63 (08/12/20 0916)  SpO2: 98 % (08/12/20 1045) Vital Signs (24h Range):  Temp:  [97.6 °F (36.4 °C)] 97.6 °F (36.4 °C)  Pulse:  [] 114  Resp:  [14-28] 26  SpO2:  [89 %-100 %] 98 %  BP: ()/(58-86) 115/63     Weight: 68 kg (150 lb)  Body mass index is 23.49 kg/m².    Physical Exam  Vitals signs and nursing note reviewed.   Constitutional:       General: He is not in acute distress.     Appearance: Normal appearance. He is normal weight. He is not ill-appearing, toxic-appearing or diaphoretic.   HENT:      Head: Normocephalic and atraumatic.   Cardiovascular:      Rate and Rhythm: Normal rate and regular rhythm.      Heart sounds: No murmur. No gallop.    Pulmonary:      Effort: Pulmonary effort is normal. No respiratory distress.      Breath sounds: No stridor. Wheezing present. No rhonchi or rales.   Chest:      Chest wall: No tenderness.   Abdominal:      General: Abdomen is flat. Bowel sounds are normal. There is no distension.      Tenderness: There is no abdominal tenderness.   Skin:     General: Skin is warm and dry.   Neurological:      Mental Status: He is alert and oriented to person, place, and time.   Psychiatric:         Mood and Affect: Mood normal.         Behavior: Behavior normal.             Significant Labs:   BMP:   Recent Labs   Lab 08/12/20  0842   *      K 4.6   CL 99   CO2 30*   BUN 16   CREATININE 1.2   CALCIUM 8.6*     CBC:   Recent Labs   Lab 08/12/20  0734   WBC 9.07   HGB 12.4*   HCT 39.1*          Significant Imaging:   CXR- nothing acute. Small R effusion     Assessment/Plan:     * COPD exacerbation  Will start steroids and doxy. Def. Wheezing on exam.  Non-compliance with meds as noted. No respiratory distress or failure. 88% on RA with activity. Expect short stay. Discharge no later than Thursday.        Type 1 diabetes mellitus with hyperglycemia  Uncontrolled. No other manifestations. A1c.      Moderate protein malnutrition  No  acute issues       Tobacco use disorder, moderate, dependence  Smoking cessation education > 3 minutes.       Chronic hepatitis C  Will discuss with patient to make sure he has had treatment      Intravenous drug abuse  Has quit. I believe him per our discussion. Takes methadone here and there- wants to try without .      Hyperlipidemia  Resume any home meds       Anemia of chronic disease    No acute issues       VTE Risk Mitigation (From admission, onward)    None             Tyrese Singh MD  Department of Hospital Medicine   [facility]

## 2020-08-13 PROBLEM — I25.10 CAD (CORONARY ARTERY DISEASE): Status: ACTIVE | Noted: 2020-08-13

## 2020-08-13 LAB
POCT GLUCOSE: 155 MG/DL (ref 70–110)
POCT GLUCOSE: 164 MG/DL (ref 70–110)
POCT GLUCOSE: 296 MG/DL (ref 70–110)
POCT GLUCOSE: 386 MG/DL (ref 70–110)
TROPONIN I SERPL DL<=0.01 NG/ML-MCNC: 0.03 NG/ML (ref 0–0.03)

## 2020-08-13 PROCEDURE — 84484 ASSAY OF TROPONIN QUANT: CPT

## 2020-08-13 PROCEDURE — 94640 AIRWAY INHALATION TREATMENT: CPT

## 2020-08-13 PROCEDURE — 27000221 HC OXYGEN, UP TO 24 HOURS

## 2020-08-13 PROCEDURE — 25000003 PHARM REV CODE 250: Performed by: INTERNAL MEDICINE

## 2020-08-13 PROCEDURE — 21400001 HC TELEMETRY ROOM

## 2020-08-13 PROCEDURE — 36415 COLL VENOUS BLD VENIPUNCTURE: CPT

## 2020-08-13 PROCEDURE — 94761 N-INVAS EAR/PLS OXIMETRY MLT: CPT

## 2020-08-13 PROCEDURE — 99900035 HC TECH TIME PER 15 MIN (STAT)

## 2020-08-13 PROCEDURE — 63600175 PHARM REV CODE 636 W HCPCS: Performed by: INTERNAL MEDICINE

## 2020-08-13 PROCEDURE — 25000242 PHARM REV CODE 250 ALT 637 W/ HCPCS: Performed by: INTERNAL MEDICINE

## 2020-08-13 RX ORDER — INSULIN ASPART 100 [IU]/ML
15 INJECTION, SOLUTION INTRAVENOUS; SUBCUTANEOUS
Status: DISCONTINUED | OUTPATIENT
Start: 2020-08-13 | End: 2020-08-14 | Stop reason: HOSPADM

## 2020-08-13 RX ORDER — PREDNISONE 20 MG/1
60 TABLET ORAL DAILY
Status: DISCONTINUED | OUTPATIENT
Start: 2020-08-13 | End: 2020-08-14 | Stop reason: HOSPADM

## 2020-08-13 RX ADMIN — ASPIRIN 325 MG: 325 TABLET, DELAYED RELEASE ORAL at 09:08

## 2020-08-13 RX ADMIN — DULOXETINE HYDROCHLORIDE 60 MG: 30 CAPSULE, DELAYED RELEASE ORAL at 09:08

## 2020-08-13 RX ADMIN — INSULIN ASPART 15 UNITS: 100 INJECTION, SOLUTION INTRAVENOUS; SUBCUTANEOUS at 04:08

## 2020-08-13 RX ADMIN — QUETIAPINE FUMARATE 50 MG: 25 TABLET ORAL at 09:08

## 2020-08-13 RX ADMIN — ATORVASTATIN CALCIUM 20 MG: 10 TABLET, FILM COATED ORAL at 09:08

## 2020-08-13 RX ADMIN — INSULIN ASPART 15 UNITS: 100 INJECTION, SOLUTION INTRAVENOUS; SUBCUTANEOUS at 09:08

## 2020-08-13 RX ADMIN — METHADONE HYDROCHLORIDE 20 MG: 10 TABLET ORAL at 09:08

## 2020-08-13 RX ADMIN — HUMAN INSULIN 45 UNITS: 100 INJECTION, SUSPENSION SUBCUTANEOUS at 09:08

## 2020-08-13 RX ADMIN — ALBUTEROL SULFATE 2.5 MG: 2.5 SOLUTION RESPIRATORY (INHALATION) at 01:08

## 2020-08-13 RX ADMIN — TRAZODONE HYDROCHLORIDE 25 MG: 50 TABLET ORAL at 09:08

## 2020-08-13 RX ADMIN — PREDNISONE 60 MG: 20 TABLET ORAL at 09:08

## 2020-08-13 RX ADMIN — DIVALPROEX SODIUM 125 MG: 125 TABLET, DELAYED RELEASE ORAL at 09:08

## 2020-08-13 RX ADMIN — DOXYCYCLINE HYCLATE 100 MG: 100 TABLET, COATED ORAL at 09:08

## 2020-08-13 RX ADMIN — INSULIN ASPART 15 UNITS: 100 INJECTION, SOLUTION INTRAVENOUS; SUBCUTANEOUS at 12:08

## 2020-08-13 RX ADMIN — METHYLPREDNISOLONE SODIUM SUCCINATE 125 MG: 125 INJECTION, POWDER, FOR SOLUTION INTRAMUSCULAR; INTRAVENOUS at 06:08

## 2020-08-13 RX ADMIN — DIVALPROEX SODIUM 125 MG: 125 TABLET, DELAYED RELEASE ORAL at 02:08

## 2020-08-13 NOTE — PLAN OF CARE
Problem: Fall Injury Risk  Goal: Absence of Fall and Fall-Related Injury  Intervention: Identify and Manage Contributors to Fall Injury Risk  Flowsheets (Taken 8/13/2020 0547)  Self-Care Promotion:   independence encouraged   BADL personal objects within reach   BADL personal routines maintained  Medication Review/Management: medications reviewed     Problem: Diabetes Comorbidity  Goal: Blood Glucose Level Within Desired Range  Intervention: Maintain Glycemic Control  Flowsheets (Taken 8/13/2020 0547)  Glycemic Management: blood glucose monitoring     Problem: Adjustment to Illness (Sepsis/Septic Shock)  Goal: Optimal Coping  Intervention: Optimize Psychosocial Adjustment to Illness  Flowsheets (Taken 8/13/2020 0547)  Supportive Measures: active listening utilized     Problem: Glycemic Control Impaired (Sepsis/Septic Shock)  Goal: Blood Glucose Level Within Desired Range  Intervention: Optimize Glycemic Control  Flowsheets (Taken 8/13/2020 0547)  Glycemic Management: blood glucose monitoring     Problem: Respiratory Compromise (Sepsis/Septic Shock)  Goal: Effective Oxygenation and Ventilation  Intervention: Promote Airway Secretion Clearance  Flowsheets (Taken 8/13/2020 0547)  Activity Management: activity adjusted per tolerance     Problem: Fluid Imbalance (Pneumonia)  Goal: Fluid Balance  Intervention: Monitor and Manage Fluid Balance  Flowsheets (Taken 8/13/2020 0547)  Fluid/Electrolyte Management: fluids provided     Problem: Infection (Pneumonia)  Goal: Resolution of Infection Signs/Symptoms  Intervention: Prevent Infection Progression  Flowsheets (Taken 8/13/2020 0547)  Fever Reduction/Comfort Measures: medication administered  Isolation Precautions: contact precautions maintained

## 2020-08-13 NOTE — NURSING
Note that patient up to the BR with oxygen tubing extensions.   Urinated approximately 200 cc yellow urine.   Has a visit from his mother at this time.   Refused gabapentin & Lovenox.    Will give report to night-shift nurse. .

## 2020-08-13 NOTE — SUBJECTIVE & OBJECTIVE
Interval History: No New issues. Feels better. No more wheezing     Review of Systems   Constitutional: Negative for activity change.   HENT: Negative for congestion and dental problem.    Respiratory: Negative for chest tightness, shortness of breath and wheezing.    Cardiovascular: Negative for chest pain.   Gastrointestinal: Negative for abdominal pain.   Genitourinary: Negative for difficulty urinating.   Musculoskeletal: Negative for arthralgias and back pain.   Psychiatric/Behavioral: Negative for agitation and behavioral problems.     Objective:     Vital Signs (Most Recent):  Temp: 98 °F (36.7 °C) (08/13/20 0347)  Pulse: 86 (08/13/20 0347)  Resp: 20 (08/13/20 0347)  BP: 126/77 (08/13/20 0347)  SpO2: 97 % (08/13/20 0347) Vital Signs (24h Range):  Temp:  [97.6 °F (36.4 °C)-98.7 °F (37.1 °C)] 98 °F (36.7 °C)  Pulse:  [] 86  Resp:  [14-28] 20  SpO2:  [89 %-100 %] 97 %  BP: ()/(58-86) 126/77     Weight: 61 kg (134 lb 7.7 oz)  Body mass index is 21.06 kg/m².    Intake/Output Summary (Last 24 hours) at 8/13/2020 0651  Last data filed at 8/12/2020 2300  Gross per 24 hour   Intake 250 ml   Output 1350 ml   Net -1100 ml      Physical Exam  Vitals signs and nursing note reviewed.   Constitutional:       General: He is not in acute distress.     Appearance: Normal appearance. He is not ill-appearing, toxic-appearing or diaphoretic.   HENT:      Head: Normocephalic and atraumatic.   Pulmonary:      Effort: No respiratory distress.      Breath sounds: Wheezing present.      Comments: + slight wheezing. Much better than on 8/12.  Neurological:      Mental Status: He is alert and oriented to person, place, and time.   Psychiatric:         Mood and Affect: Mood normal.         Behavior: Behavior normal.         Significant Labs:   BMP:   Recent Labs   Lab 08/12/20  0842   *      K 4.6   CL 99   CO2 30*   BUN 16   CREATININE 1.2   CALCIUM 8.6*     CBC:   Recent Labs   Lab 08/12/20  0734   WBC 9.07    HGB 12.4*   HCT 39.1*          Significant Imaging:

## 2020-08-13 NOTE — ASSESSMENT & PLAN NOTE
As found on CTA.  Noted small trop. Leak. Likely demand ischemia.  Will check trop now. Follow up with cards as outpatient.

## 2020-08-13 NOTE — PROGRESS NOTES
[facility] Medicine  Progress Note    Patient Name: Cali Mabry  MRN: [id number]  Patient Class: IP- Inpatient   Admission Date: 8/12/2020  Length of Stay: 1 days  Attending Physician: Tyrese Dickinson MD  Primary Care Provider: Cullen Zimmer MD        Subjective:     Principal Problem:COPD exacerbation        HPI:  Mr. Mabry is a 55 yo Male well known to me.  He was recently hospitalized and discharged about 5 days ago with a dx of COPD.  He did not get his zithromax or prednisone. He returns to the ED today with a CC of SOB and wheezing that started this am. He did not fill his prednisone because he was afraid of elevated blood sugars.  The patient is a well known past IV drug user and takes methadone when he can get it.  He denies any fevers or chills. Non-productive cough. No Covid exposure. Non-ill appearing.      Overview/Hospital Course:  Patient was admitted to the hospital for eval and treatment of COPD exacerbation.  Patient recently discharged but did not fill Abx or prednisone. He was started on IV steroids/breathing treatments and Doxy and improved in first 24 hours     Interval History: No New issues. Feels better. No more wheezing     Review of Systems   Constitutional: Negative for activity change.   HENT: Negative for congestion and dental problem.    Respiratory: Negative for chest tightness, shortness of breath and wheezing.    Cardiovascular: Negative for chest pain.   Gastrointestinal: Negative for abdominal pain.   Genitourinary: Negative for difficulty urinating.   Musculoskeletal: Negative for arthralgias and back pain.   Psychiatric/Behavioral: Negative for agitation and behavioral problems.     Objective:     Vital Signs (Most Recent):  Temp: 98 °F (36.7 °C) (08/13/20 0347)  Pulse: 86 (08/13/20 0347)  Resp: 20 (08/13/20 0347)  BP: 126/77 (08/13/20 0347)  SpO2: 97 % (08/13/20 0347) Vital Signs (24h Range):  Temp:  [97.6 °F (36.4 °C)-98.7 °F (37.1  °C)] 98 °F (36.7 °C)  Pulse:  [] 86  Resp:  [14-28] 20  SpO2:  [89 %-100 %] 97 %  BP: ()/(58-86) 126/77     Weight: 61 kg (134 lb 7.7 oz)  Body mass index is 21.06 kg/m².    Intake/Output Summary (Last 24 hours) at 8/13/2020 0651  Last data filed at 8/12/2020 2300  Gross per 24 hour   Intake 250 ml   Output 1350 ml   Net -1100 ml      Physical Exam  Vitals signs and nursing note reviewed.   Constitutional:       General: He is not in acute distress.     Appearance: Normal appearance. He is not ill-appearing, toxic-appearing or diaphoretic.   HENT:      Head: Normocephalic and atraumatic.   Pulmonary:      Effort: No respiratory distress.      Breath sounds: Wheezing present.      Comments: + slight wheezing. Much better than on 8/12.  Neurological:      Mental Status: He is alert and oriented to person, place, and time.   Psychiatric:         Mood and Affect: Mood normal.         Behavior: Behavior normal.         Significant Labs:   BMP:   Recent Labs   Lab 08/12/20  0842   *      K 4.6   CL 99   CO2 30*   BUN 16   CREATININE 1.2   CALCIUM 8.6*     CBC:   Recent Labs   Lab 08/12/20  0734   WBC 9.07   HGB 12.4*   HCT 39.1*          Significant Imaging:      Assessment/Plan:      * COPD exacerbation  Will start steroids and doxy. Def. Wheezing on exam.  Non-compliance with meds as noted. No respiratory distress or failure. 88% on RA with activity. Expect short stay. Discharge no later than Thursday.      Improved. Will change to po prednisone today and watch. Home likely on 8/14.       Type 1 diabetes mellitus with hyperglycemia  Uncontrolled. No other manifestations. A1c.  Adjusting insulin       CAD (coronary artery disease)  As found on CTA.  Noted small trop. Leak. Likely demand ischemia.  Will check trop now. Follow up with cards as outpatient.         Moderate protein malnutrition  No acute issues       Empyema lung  Confirmed on CT     Pulmonary nodule  Will have pulmonary  follow up       Tobacco use disorder, moderate, dependence  Smoking cessation education > 3 minutes.       Chronic hepatitis C  Will discuss with patient to make sure he has had treatment      Intravenous drug abuse  Has quit. I believe him per our discussion. Takes methadone here and there- wants to try without .      Hyperlipidemia  Resume any home meds       Anemia of chronic disease    No acute issues       VTE Risk Mitigation (From admission, onward)         Ordered     enoxaparin injection 40 mg  Every 24 hours      08/12/20 1147                Discharge Planning   ROLA:      Code Status: Prior   Is the patient medically ready for discharge?:     Reason for patient still in hospital (select all that apply): Patient trending condition         Wean prednisone. Likely home on 8/14. Clinically improved.               Tyrese Singh MD  Department of Hospital Medicine   [facility]

## 2020-08-13 NOTE — ASSESSMENT & PLAN NOTE
Will start steroids and doxy. Def. Wheezing on exam.  Non-compliance with meds as noted. No respiratory distress or failure. 88% on RA with activity. Expect short stay. Discharge no later than Thursday.      Improved. Will change to po prednisone today and watch. Home likely on 8/14.

## 2020-08-13 NOTE — HOSPITAL COURSE
Patient was admitted to the hospital for eval and treatment of COPD exacerbation.  Patient recently discharged but did not fill Abx or prednisone. He was started on IV steroids/breathing treatments and Doxy and improved in first 24 hours. 48 hours later- he was sitting up in a chair on RA and stated he was back to his normal. He was requesting discharge to home. Activity as tolerated. Diet- ADA 2000 lexie diet. Prednisone taper. Already has Azithro at home- will finish it. Follow up with PCP in one week     Patient needs follow up with Pulmonary in 6 months for pulmonary nodule in this smoker.  thanks

## 2020-08-13 NOTE — PLAN OF CARE
Patient's Disposition: Home w/ mother    Patient confirmed that HELP AT HOME will be from: Mother, sister and niece     Preferences:   PCP: Cullen Zimmer MD   Pharmacy:   [id number] - [place] - [address]  [address] [place] [address]  Phone: [phone] Fax: [phone]    [facility] - [place] - [address] [address]  [address]  [place] [address]  Phone: [phone] Fax: [phone]         08/13/20 1628   Discharge Assessment   Assessment Type Discharge Planning Assessment   Confirmed/corrected address and phone number on facesheet? Yes   Assessment information obtained from? Medical Record   Expected Length of Stay (days) 3   Prior to hospitilization cognitive status: Alert/Oriented   Prior to hospitalization functional status: Independent   Current cognitive status: Alert/Oriented   Current Functional Status: Independent   Facility Arrived From: Home   Lives With parent(s)   Able to Return to Prior Arrangements yes   Is patient able to care for self after discharge? Unable to determine at this time (comments)   Who are your caregiver(s) and their phone number(s)? Pt's mother: Debra Carranza  [phone]; Pt's sister: Amarilis Easley, sister [phone]   Patient's perception of discharge disposition home or selfcare   Readmission Within the Last 30 Days previous discharge plan unsuccessful   If yes, most recent facility name: [facility]   Patient currently being followed by outpatient case management? No   Patient currently receives any other outside agency services? No   Equipment Currently Used at Home nebulizer   Do you have any problems affording any of your prescribed medications? No   Is the patient taking medications as prescribed? yes   Does the patient have transportation home? Yes   Transportation Anticipated family or friend will provide   Dialysis Name and Scheduled days N/A   Does the patient receive services at the Coumadin Clinic? No    Discharge Plan A Home with family   DME Needed Upon Discharge    (Pending)   Patient/Family in Agreement with Plan yes   Readmission Questionnaire   At the time of your discharge, did someone talk to you about what your health problems were? Yes   At the time of discharge, did someone talk to you about what to watch out for regarding worsening of your health problem? Yes   At the time of discharge, did someone talk to you about what to do if you experienced worsening of your health problem? Yes   At the time of discharge, did someone talk to you about which medication to take when you left the hospital and which ones to stop taking? Yes   At the time of discharge, did someone talk to you about when and where to follow up with a doctor after you left the hospital? Yes   What do you believe caused you to be sick enough to be re-admitted? Shortness of breathe   How often do you need to have someone help you when you read instructions, pamphlets, or other written material from your doctor or pharmacy? Often   Do you have problems taking your medications as prescribed? No   Do you have any problems affording any of  your prescribed medications? No   Do you have problems obtaining/receiving your medications? No   Does the patient have transportation to healthcare appointments? No   Living Arrangements house   Does the patient have family/friends to help with healtcare needs after discharge? yes   Does your caregiver provide all the help you need? Yes

## 2020-08-13 NOTE — PLAN OF CARE
Problem: Fall Injury Risk  Goal: Absence of Fall and Fall-Related Injury  Outcome: Ongoing, Progressing  Intervention: Identify and Manage Contributors to Fall Injury Risk  Flowsheets (Taken 8/13/2020 1722)  Self-Care Promotion:   BADL personal objects within reach   BADL personal routines maintained  Medication Review/Management:   medications reviewed   high risk medications identified  Intervention: Promote Injury-Free Environment  Flowsheets (Taken 8/13/2020 1722)  Safety Promotion/Fall Prevention:   assistive device/personal item within reach   bed alarm set   Fall Risk reviewed with patient/family   medications reviewed   nonskid shoes/socks when out of bed

## 2020-08-13 NOTE — NURSING
Report received from Eugenia SALINAS. Patient awake in bed. NAD noted. 2L O2 in use nasal cannula. Bed alarm set. Call light within reach. Encouraged to call for assistance. Will continue to monitor.

## 2020-08-13 NOTE — NURSING
Report given to Becca SALINAS. Pt resting comfortably. No acute distress noted. Safety precautions maintained.     Chart check completed.

## 2020-08-14 VITALS
BODY MASS INDEX: 21.42 KG/M2 | HEIGHT: 67 IN | OXYGEN SATURATION: 95 % | DIASTOLIC BLOOD PRESSURE: 63 MMHG | RESPIRATION RATE: 17 BRPM | SYSTOLIC BLOOD PRESSURE: 136 MMHG | HEART RATE: 88 BPM | TEMPERATURE: 98 F | WEIGHT: 136.44 LBS

## 2020-08-14 PROBLEM — J44.1 COPD EXACERBATION: Status: RESOLVED | Noted: 2020-08-08 | Resolved: 2020-08-14

## 2020-08-14 LAB
BACTERIA BLD CULT: NORMAL
BACTERIA BLD CULT: NORMAL
POCT GLUCOSE: 379 MG/DL (ref 70–110)
POCT GLUCOSE: 55 MG/DL (ref 70–110)
POCT GLUCOSE: 84 MG/DL (ref 70–110)

## 2020-08-14 PROCEDURE — 63600175 PHARM REV CODE 636 W HCPCS: Performed by: INTERNAL MEDICINE

## 2020-08-14 PROCEDURE — 25000003 PHARM REV CODE 250: Performed by: INTERNAL MEDICINE

## 2020-08-14 RX ORDER — INSULIN LISPRO 100 [IU]/ML
15 INJECTION, SOLUTION INTRAVENOUS; SUBCUTANEOUS
Qty: 9 ML | Refills: 11 | Status: ON HOLD
Start: 2020-08-14 | End: 2021-05-03 | Stop reason: SDUPTHER

## 2020-08-14 RX ORDER — PREDNISONE 20 MG/1
TABLET ORAL
Qty: 9 TABLET | Refills: 0 | OUTPATIENT
Start: 2020-08-14 | End: 2020-08-29

## 2020-08-14 RX ORDER — AZITHROMYCIN 250 MG/1
250 TABLET, FILM COATED ORAL DAILY
Qty: 3 TABLET | Refills: 0 | Status: SHIPPED | OUTPATIENT
Start: 2020-08-14 | End: 2020-08-17

## 2020-08-14 RX ADMIN — ATORVASTATIN CALCIUM 20 MG: 10 TABLET, FILM COATED ORAL at 08:08

## 2020-08-14 RX ADMIN — PREDNISONE 40 MG: 20 TABLET ORAL at 08:08

## 2020-08-14 RX ADMIN — DULOXETINE HYDROCHLORIDE 60 MG: 30 CAPSULE, DELAYED RELEASE ORAL at 08:08

## 2020-08-14 RX ADMIN — ASPIRIN 325 MG: 325 TABLET, DELAYED RELEASE ORAL at 08:08

## 2020-08-14 RX ADMIN — METHADONE HYDROCHLORIDE 20 MG: 10 TABLET ORAL at 08:08

## 2020-08-14 RX ADMIN — DIVALPROEX SODIUM 125 MG: 125 TABLET, DELAYED RELEASE ORAL at 05:08

## 2020-08-14 RX ADMIN — INSULIN ASPART 15 UNITS: 100 INJECTION, SOLUTION INTRAVENOUS; SUBCUTANEOUS at 12:08

## 2020-08-14 RX ADMIN — DOXYCYCLINE HYCLATE 100 MG: 100 TABLET, COATED ORAL at 08:08

## 2020-08-14 NOTE — PLAN OF CARE
Problem: Fall Injury Risk  Goal: Absence of Fall and Fall-Related Injury  Outcome: Met     Problem: Adult Inpatient Plan of Care  Goal: Plan of Care Review  Outcome: Met  Goal: Patient-Specific Goal (Individualization)  Outcome: Met  Goal: Absence of Hospital-Acquired Illness or Injury  Outcome: Met  Goal: Optimal Comfort and Wellbeing  Outcome: Met  Goal: Readiness for Transition of Care  Outcome: Met  Goal: Rounds/Family Conference  Outcome: Met     Problem: Diabetes Comorbidity  Goal: Blood Glucose Level Within Desired Range  Outcome: Met     Problem: Adjustment to Illness (Sepsis/Septic Shock)  Goal: Optimal Coping  Outcome: Met     Problem: Bleeding (Sepsis/Septic Shock)  Goal: Absence of Bleeding  Outcome: Met     Problem: Glycemic Control Impaired (Sepsis/Septic Shock)  Goal: Blood Glucose Level Within Desired Range  Outcome: Met     Problem: Hemodynamic Instability (Sepsis/Septic Shock)  Goal: Effective Tissue Perfusion  Outcome: Met     Problem: Infection (Sepsis/Septic Shock)  Goal: Absence of Infection Signs/Symptoms  Outcome: Met     Problem: Nutrition Impaired (Sepsis/Septic Shock)  Goal: Optimal Nutrition Intake  Outcome: Met     Problem: Respiratory Compromise (Sepsis/Septic Shock)  Goal: Effective Oxygenation and Ventilation  Outcome: Met     Problem: Fluid Imbalance (Pneumonia)  Goal: Fluid Balance  Outcome: Met     Problem: Infection (Pneumonia)  Goal: Resolution of Infection Signs/Symptoms  Outcome: Met     Problem: Respiratory Compromise (Pneumonia)  Goal: Effective Oxygenation and Ventilation  Outcome: Met

## 2020-08-14 NOTE — PLAN OF CARE
08/14/20 1124   Final Note   Assessment Type Final Discharge Note   Anticipated Discharge Disposition Home   What phone number can be called within the next 1-3 days to see how you are doing after discharge? [phone]   Hospital Follow Up  Appt(s) scheduled? Yes   Discharge plans and expectations educations in teach back method with documentation complete? Yes   Right Care Referral Info   Post Acute Recommendation No Care   Post-Acute Status   Discharge Delays None known at this time     EDUCATION:  Patient After Visit Summary (AVS) updated to include educational information on COPD that will be printed on patient's AVS to review upon discharge; Information reviewed with patient that include: signs and symptoms to look for and call the doctor if experiencing, and symptoms that may indicate a medical emergency: CALL 911.            SW taught patient about things he/she is responsible for when discharged to help with his/her recovery:  Particularly on how to Manage his care at home:  1. Getting his prescriptions filled.  2. Taking his medications as directed. DO NOT MISS ANY DOSES!  3. Going to his follow-up doctor appointments.       Help at home will be from pt's mother, assisting in pt's recovery.     Follow-up appointments made with PCP     Nurse, Subha, notified that all CM needs have been met.

## 2020-08-14 NOTE — PROGRESS NOTES
[facility] Hospital Medicine  Progress Note    Patient Name: Cali Mabry  MRN: [id number]  Patient Class: IP- Inpatient   Admission Date: 8/12/2020  Length of Stay: 2 days  Attending Physician: Tyrese Dickinson MD  Primary Care Provider: Cullen Zimmer MD        Subjective:     Principal Problem:COPD exacerbation        HPI:  Mr. Mabry is a 55 yo Male well known to me.  He was recently hospitalized and discharged about 5 days ago with a dx of COPD.  He did not get his zithromax or prednisone. He returns to the ED today with a CC of SOB and wheezing that started this am. He did not fill his prednisone because he was afraid of elevated blood sugars.  The patient is a well known past IV drug user and takes methadone when he can get it.  He denies any fevers or chills. Non-productive cough. No Covid exposure. Non-ill appearing.      Overview/Hospital Course:  Patient was admitted to the hospital for eval and treatment of COPD exacerbation.  Patient recently discharged but did not fill Abx or prednisone. He was started on IV steroids/breathing treatments and Doxy and improved in first 24 hours. 48 hours later- he was sitting up in a chair on RA and stated he was back to his normal. He was requesting discharge to home. Activity as tolerated. Diet- ADA 2000 lexie diet. Prednisone taper. Already has Azithro at home- will finish it. Follow up with PCP in one week     Interval History: wants to go home.       Review of Systems   Constitutional: Negative for activity change.   HENT: Negative for congestion and dental problem.    Respiratory: Negative for chest tightness, shortness of breath and wheezing.    Cardiovascular: Negative for chest pain.   Gastrointestinal: Negative for abdominal pain.   Genitourinary: Negative for difficulty urinating.   Musculoskeletal: Negative for arthralgias and back pain.   Psychiatric/Behavioral: Negative for agitation and behavioral problems.     Objective:      Vital Signs (Most Recent):  Temp: 97.8 °F (36.6 °C) (08/14/20 0747)  Pulse: 86 (08/14/20 0747)  Resp: 18 (08/14/20 0832)  BP: 124/63 (08/14/20 0747)  SpO2: 96 % (08/14/20 0747) Vital Signs (24h Range):  Temp:  [97.3 °F (36.3 °C)-98 °F (36.7 °C)] 97.8 °F (36.6 °C)  Pulse:  [78-96] 86  Resp:  [17-18] 18  SpO2:  [95 %-97 %] 96 %  BP: (105-128)/(55-70) 124/63     Weight: 61.9 kg (136 lb 7.4 oz)  Body mass index is 21.37 kg/m².    Intake/Output Summary (Last 24 hours) at 8/14/2020 1018  Last data filed at 8/13/2020 1230  Gross per 24 hour   Intake 300 ml   Output --   Net 300 ml      Physical Exam  Vitals signs and nursing note reviewed.   Constitutional:       General: He is not in acute distress.     Appearance: Normal appearance. He is not ill-appearing, toxic-appearing or diaphoretic.   HENT:      Head: Normocephalic and atraumatic.   Pulmonary:      Effort: No respiratory distress.      Breath sounds: No wheezing.      Comments: + slight wheezing. Much better than on 8/12.  Neurological:      Mental Status: He is alert and oriented to person, place, and time.   Psychiatric:         Mood and Affect: Mood normal.         Behavior: Behavior normal.         Significant Labs: BMP: No results for input(s): GLU, NA, K, CL, CO2, BUN, CREATININE, CALCIUM, MG in the last 48 hours.  CBC: No results for input(s): WBC, HGB, HCT, PLT in the last 48 hours.    Significant Imaging:       Assessment/Plan:      * COPD exacerbation  Will start steroids and doxy. Def. Wheezing on exam.  Non-compliance with meds as noted. No respiratory distress or failure. 88% on RA with activity. Expect short stay. Discharge no later than Thursday.      Improved. Will change to po prednisone today and watch. Home likely on 8/14.     On RA. Requesting discharge.       Type 1 diabetes mellitus with hyperglycemia  Uncontrolled. No other manifestations. A1c.  Adjusting insulin       CAD (coronary artery disease)  As found on CTA.  Noted small trop.  Leak. Likely demand ischemia.  Will check trop now. Follow up with cards as outpatient.         Moderate protein malnutrition  No acute issues       Empyema lung  Confirmed on CT     Pulmonary nodule  Will have pulmonary follow up       Tobacco use disorder, moderate, dependence  Smoking cessation education > 3 minutes.       Chronic hepatitis C  Will discuss with patient to make sure he has had treatment      Intravenous drug abuse  Has quit. I believe him per our discussion. Takes methadone here and there- wants to try without .      Hyperlipidemia  Resume any home meds       Anemia of chronic disease    No acute issues       VTE Risk Mitigation (From admission, onward)         Ordered     enoxaparin injection 40 mg  Every 24 hours      08/12/20 1147                Will d/c to home.             Tyrese Singh MD  Department of Hospital Medicine   [facility]

## 2020-08-14 NOTE — DISCHARGE INSTRUCTIONS
WRITTEN HEALTHCARE DISCHARGE INFORMATION     Things that YOU are RESPONSIBLE for to Manage Your Care At Home:    1. Getting your prescriptions filled.  2. Taking you medications as directed. DO NOT MISS ANY DOSES!  3. Going to your follow-up doctor appointments. This is important because it allows the doctor to monitor your progress and to determine if any changes need to be made to your treatment plan.    If you are unable to make your follow up appointments, please call the number listed and reschedule this appointment.     ____________HELP AT HOME____________________    Experiencing any SIGNS or SYMPTOMS: YOU CAN    Schedule a same day appopintment with your Primary Care Doctor or  you can call [facility] Care Line for 24/7 assistance at [phone]    If you are experience any signs or symptoms that have become severe, Call 911 and come to your nearest Emergency Room.    Thank you for choosing [facility] and allowing us to care for you.   From your care management team: Yani Hardwick LMSW, [phone]  Follow-up Information     Cullen Zimmer MD. Go on 8/20/2020.    Specialty: Internal Medicine  Why: Post- Hospital Follow-Up: Thursday, August 20, 2020 at 11:00AM   Contact information:  [address] [facility]  [address]  [place] [address] [phone]

## 2020-08-14 NOTE — NURSING
Handoff received from FREDERIC Mcmullen      Pt resting in bed quietly. NAD noted. No c/o pain.     Fall and safety precautions maintained. Bed locked in lowest position, with side rails up x2. Call bell and personal items within reach

## 2020-08-14 NOTE — ASSESSMENT & PLAN NOTE
Will start steroids and doxy. Def. Wheezing on exam.  Non-compliance with meds as noted. No respiratory distress or failure. 88% on RA with activity. Expect short stay. Discharge no later than Thursday.      Improved. Will change to po prednisone today and watch. Home likely on 8/14.     On RA. Requesting discharge.

## 2020-08-14 NOTE — DISCHARGE SUMMARY
[facility] Hospital Medicine  Discharge Summary      Patient Name: Cali Mabry  MRN: [id number]  Admission Date: 8/12/2020  Hospital Length of Stay: 2 days  Discharge Date and Time:  08/14/2020 10:24 AM  Attending Physician: Tyrese Dickinson MD   Discharging Provider: Tyrese Dickinson MD  Primary Care Provider: Cullen Zimmer MD      HPI:   Mr. Mabry is a 53 yo Male well known to me.  He was recently hospitalized and discharged about 5 days ago with a dx of COPD.  He did not get his zithromax or prednisone. He returns to the ED today with a CC of SOB and wheezing that started this am. He did not fill his prednisone because he was afraid of elevated blood sugars.  The patient is a well known past IV drug user and takes methadone when he can get it.  He denies any fevers or chills. Non-productive cough. No Covid exposure. Non-ill appearing.      * No surgery found *      Hospital Course:   Patient was admitted to the hospital for eval and treatment of COPD exacerbation.  Patient recently discharged but did not fill Abx or prednisone. He was started on IV steroids/breathing treatments and Doxy and improved in first 24 hours. 48 hours later- he was sitting up in a chair on RA and stated he was back to his normal. He was requesting discharge to home. Activity as tolerated. Diet- ADA 2000 lexie diet. Prednisone taper. Already has Azithro at home- will finish it. Follow up with PCP in one week     Patient needs follow up with Pulmonary in 6 months for pulmonary nodule in this smoker.  thanks     Consults:     No new Assessment & Plan notes have been filed under this hospital service since the last note was generated.  Service: Hospital Medicine    Final Active Diagnoses:    Diagnosis Date Noted POA    Type 1 diabetes mellitus with hyperglycemia [E10.65] 04/03/2016 Yes    CAD (coronary artery disease) [I25.10] 08/13/2020 Yes    Moderate protein malnutrition [E44.0] 04/23/2020 Yes     Empyema lung [J86.9] 04/22/2020 Yes    Pulmonary nodule [R91.1] 04/13/2019 Yes    Chronic hepatitis C [B18.2] 11/23/2017 Yes     Chronic    Hyperlipidemia [E78.5] 11/23/2017 Yes     Chronic    Intravenous drug abuse [F19.10] 11/23/2017 Yes     Chronic    Tobacco use disorder, moderate, dependence [F17.200] 11/23/2017 Yes    Anemia of chronic disease [D63.8] 04/03/2016 Yes      Problems Resolved During this Admission:    Diagnosis Date Noted Date Resolved POA    PRINCIPAL PROBLEM:  COPD exacerbation [J44.1] 08/08/2020 08/14/2020 Yes       Discharged Condition: good    Disposition: Home or Self Care    Follow Up:  Follow-up Information     Cullen Zimmer MD In 1 week.    Specialty: Internal Medicine  Contact information:  [address]  [address]  [facility] [id number] [phone]                 Patient Instructions:   No discharge procedures on file.    Significant Diagnostic Studies    Pending Diagnostic Studies:     None         Medications:  Reconciled Home Medications:      Medication List      CHANGE how you take these medications    insulin lispro 100 unit/mL injection  Inject 15 Units into the skin 3 (three) times daily before meals.  What changed: how much to take     insulin  unit/mL injection  Inject 45 Units into the skin every evening.  What changed: how much to take     polyethylene glycol 17 gram Pwpk  Commonly known as: GLYCOLAX  Take 17 g by mouth daily as needed (Constipation).  What changed: when to take this     predniSONE 20 MG tablet  Commonly known as: DELTASONE  2 tablets po daily x 3 days  1 tablet po daily x 3 days  What changed:   · how much to take  · how to take this  · when to take this  · additional instructions        CONTINUE taking these medications    acetaminophen 650 MG Tbsr  Commonly known as: TYLENOL  Take 650 mg by mouth every 8 (eight) hours as needed.     albuterol 90 mcg/actuation inhaler  Commonly known as: PROVENTIL/VENTOLIN HFA  Inhale 1-2 puffs into the  lungs every 6 (six) hours as needed for Wheezing or Shortness of Breath. Rescue     albuterol-ipratropium 2.5 mg-0.5 mg/3 mL nebulizer solution  Commonly known as: DUO-NEB  Take 3 mLs by nebulization every 4 (four) hours as needed for Wheezing or Shortness of Breath. Rescue     ASCORBIC ACID (VITAMIN C) ORAL  Take 1 tablet by mouth once daily.     aspirin 325 MG EC tablet  Commonly known as: ECOTRIN  Take 1 tablet (325 mg total) by mouth once daily.     atorvastatin 20 MG tablet  Commonly known as: LIPITOR  Take 20 mg by mouth once daily.     azithromycin 250 MG tablet  Commonly known as: Z-IRASEMA  Take 1 tablet (250 mg total) by mouth once daily. for 3 days     b complex vitamins tablet  Take 1 tablet by mouth once daily.     benzonatate 100 MG capsule  Commonly known as: TESSALON  Take 1 capsule (100 mg total) by mouth 3 (three) times daily as needed for Cough.     bisacodyL 10 mg Supp  Commonly known as: DULCOLAX  Place 10 mg rectally daily as needed.     blood sugar diagnostic Strp  Use To check blood glucose three times daily,     blood-glucose meter Misc  To check Blood glucose three times daily,     calcium carbonate 200 mg calcium (500 mg) chewable tablet  Commonly known as: TUMS  Take 1 tablet (500 mg total) by mouth daily as needed for Heartburn.     diphenhydrAMINE 25 mg capsule  Commonly known as: BENADRYL  Take 25 mg by mouth every 6 (six) hours as needed for Itching.     divalproex 125 MG EC tablet  Commonly known as: DEPAKOTE  Take 125 mg by mouth every 8 (eight) hours.     docusate sodium 100 MG capsule  Commonly known as: COLACE  Take 1 capsule (100 mg total) by mouth 2 (two) times daily as needed for Constipation.     DULoxetine 60 MG capsule  Commonly known as: CYMBALTA  Take 1 capsule (60 mg total) by mouth once daily.     folic acid 800 MCG Tab  Commonly known as: FOLVITE  Take 800 mcg by mouth once daily.     folic acid-vit B6-vit B12 2.5-25-2 mg 2.5-25-2 mg Tab  Commonly known as: FOLBIC or  Equiv  Take 1 tablet by mouth once daily.     gabapentin 300 MG capsule  Commonly known as: NEURONTIN  Take 2 capsules (600 mg total) by mouth 3 (three) times daily.     GLUCAGEN HYPOKIT 1 mg Solr  Generic drug: glucagon (human recombinant)  Inject 1 mg into the muscle as needed.     glucose 4 GM chewable tablet  Take 16 g by mouth as needed for Low blood sugar.     HALOPERIDOL LACTATE INJ  Inject 1 mg as directed every 4 (four) hours as needed.     hydrOXYzine HCL 25 MG tablet  Commonly known as: ATARAX  Take 1 tablet (25 mg total) by mouth 3 (three) times daily as needed for Anxiety.     insulin aspart U-100 100 unit/mL injection  Commonly known as: NOVOLOG  Inject 1-10 Units into the skin 3 (three) times daily with meals.     lancets Misc  To check BG 3 times daily, to use with insurance preferred meter     * lidocaine 5 %  Commonly known as: LIDODERM  Place 1 patch onto the skin once daily. Remove & Discard patch within 12 hours or as directed by MD.  Apply to shoulder for pain     * lidocaine 5 %  Commonly known as: LIDODERM  Place 1 patch onto the skin daily as needed. Remove & Discard patch within 12 hours or as directed by MD.  Apply to back for pain     melatonin 3 mg tablet  Commonly known as: MELATIN  Take 2 tablets (6 mg total) by mouth nightly as needed for Insomnia.     mineral oil enema  Commonly known as: FLEET OIL RETENTION  Place 1 enema rectally daily as needed for Constipation.     multivitamin per tablet  Commonly known as: THERAGRAN  Take 1 tablet by mouth once daily.     naloxone 4 mg/actuation Spry  Commonly known as: NARCAN  4mg by nasal route as needed for opioid overdose; may repeat every 2-3 minutes in alternating nostrils until medical help arrives. Call 911     ondansetron 2 mg/mL injection  Commonly known as: ZOFRAN  Inject 4 mg into the vein every 6 (six) hours as needed.     QUEtiapine 50 MG tablet  Commonly known as: SEROQUEL  Take 50 mg by mouth every evening.     RISAQUAD  ORAL  Take 1 capsule by mouth once daily.     traZODone 50 MG tablet  Commonly known as: DESYREL  Take 25 mg by mouth every evening.         * This list has 2 medication(s) that are the same as other medications prescribed for you. Read the directions carefully, and ask your doctor or other care provider to review them with you.            STOP taking these medications    ondansetron 8 MG Tbdl  Commonly known as: ZOFRAN-ODT            Indwelling Lines/Drains at time of discharge:   Lines/Drains/Airways     None                 Time spent on the discharge of patient:< 30 minutes  Patient was seen and examined on the date of discharge and determined to be suitable for discharge.         Tyrese Singh MD  Department of Hospital Medicine  [facility]

## 2020-08-14 NOTE — NURSING
Patients POCT blood glucose reading 55mg/dL. Given 4oz of Orange juice and 2 packs of jose crackers. Will recheck

## 2020-08-14 NOTE — SUBJECTIVE & OBJECTIVE
Interval History: wants to go home.       Review of Systems   Constitutional: Negative for activity change.   HENT: Negative for congestion and dental problem.    Respiratory: Negative for chest tightness, shortness of breath and wheezing.    Cardiovascular: Negative for chest pain.   Gastrointestinal: Negative for abdominal pain.   Genitourinary: Negative for difficulty urinating.   Musculoskeletal: Negative for arthralgias and back pain.   Psychiatric/Behavioral: Negative for agitation and behavioral problems.     Objective:     Vital Signs (Most Recent):  Temp: 97.8 °F (36.6 °C) (08/14/20 0747)  Pulse: 86 (08/14/20 0747)  Resp: 18 (08/14/20 0832)  BP: 124/63 (08/14/20 0747)  SpO2: 96 % (08/14/20 0747) Vital Signs (24h Range):  Temp:  [97.3 °F (36.3 °C)-98 °F (36.7 °C)] 97.8 °F (36.6 °C)  Pulse:  [78-96] 86  Resp:  [17-18] 18  SpO2:  [95 %-97 %] 96 %  BP: (105-128)/(55-70) 124/63     Weight: 61.9 kg (136 lb 7.4 oz)  Body mass index is 21.37 kg/m².    Intake/Output Summary (Last 24 hours) at 8/14/2020 1018  Last data filed at 8/13/2020 1230  Gross per 24 hour   Intake 300 ml   Output --   Net 300 ml      Physical Exam  Vitals signs and nursing note reviewed.   Constitutional:       General: He is not in acute distress.     Appearance: Normal appearance. He is not ill-appearing, toxic-appearing or diaphoretic.   HENT:      Head: Normocephalic and atraumatic.   Pulmonary:      Effort: No respiratory distress.      Breath sounds: No wheezing.      Comments: + slight wheezing. Much better than on 8/12.  Neurological:      Mental Status: He is alert and oriented to person, place, and time.   Psychiatric:         Mood and Affect: Mood normal.         Behavior: Behavior normal.         Significant Labs: BMP: No results for input(s): GLU, NA, K, CL, CO2, BUN, CREATININE, CALCIUM, MG in the last 48 hours.  CBC: No results for input(s): WBC, HGB, HCT, PLT in the last 48 hours.    Significant Imaging:

## 2020-08-14 NOTE — PLAN OF CARE
Problem: Fall Injury Risk  Goal: Absence of Fall and Fall-Related Injury  Intervention: Identify and Manage Contributors to Fall Injury Risk  Flowsheets (Taken 8/14/2020 0600)  Self-Care Promotion:   BADL personal objects within reach   independence encouraged  Medication Review/Management: medications reviewed  Intervention: Promote Injury-Free Environment  Flowsheets (Taken 8/14/2020 0600)  Safety Promotion/Fall Prevention:   assistive device/personal item within reach   instructed to call staff for mobility   high risk medications identified   Fall Risk reviewed with patient/family   side rails raised x 2  Environmental Safety Modification: assistive device/personal items within reach     Problem: Diabetes Comorbidity  Goal: Blood Glucose Level Within Desired Range  Intervention: Maintain Glycemic Control  Flowsheets (Taken 8/14/2020 0600)  Glycemic Management: blood glucose monitoring     Problem: Respiratory Compromise (Sepsis/Septic Shock)  Goal: Effective Oxygenation and Ventilation  Intervention: Promote Airway Secretion Clearance  Flowsheets (Taken 8/14/2020 0600)  Cough And Deep Breathing: done independently per patient  Activity Management: ambulated to bathroom - L4  Intervention: Optimize Oxygenation and Ventilation  Flowsheets (Taken 8/14/2020 0600)  Head of Bed (HOB):   HOB at 30-45 degrees   HOB at 30 degrees

## 2020-08-14 NOTE — NURSING
Report given to Subha SALINAS. Pt resting comfortably. No acute distress noted. Safety precautions maintained.     Chart check completed.

## 2020-08-17 ENCOUNTER — PATIENT OUTREACH (OUTPATIENT)
Dept: ADMINISTRATIVE | Facility: CLINIC | Age: 55
End: 2020-08-17

## 2020-08-17 LAB
BACTERIA BLD CULT: NORMAL
BACTERIA BLD CULT: NORMAL

## 2020-08-17 NOTE — PROGRESS NOTES
C3 nurse attempted to contact patient. No answer.  C3 nurse attempted to contact Cali for a TCC post hospital discharge follow up call. No answer at phone number listed and no voicemail available. The patient does not have a scheduled HOSFU appointment within 7-14 days post hospital discharge date 8/14/20. Non [facility] PCP,

## 2020-08-29 ENCOUNTER — HOSPITAL ENCOUNTER (EMERGENCY)
Facility: HOSPITAL | Age: 55
Discharge: HOME | End: 2020-08-29
Attending: EMERGENCY MEDICINE
Payer: MEDICARE

## 2020-08-29 VITALS
TEMPERATURE: 99 F | DIASTOLIC BLOOD PRESSURE: 58 MMHG | WEIGHT: 136 LBS | BODY MASS INDEX: 21.35 KG/M2 | SYSTOLIC BLOOD PRESSURE: 101 MMHG | HEIGHT: 67 IN | OXYGEN SATURATION: 96 % | HEART RATE: 118 BPM | RESPIRATION RATE: 34 BRPM

## 2020-08-29 DIAGNOSIS — R06.2 WHEEZING: ICD-10-CM

## 2020-08-29 DIAGNOSIS — R05.9 COUGH: ICD-10-CM

## 2020-08-29 DIAGNOSIS — R06.02 SHORTNESS OF BREATH: Primary | ICD-10-CM

## 2020-08-29 DIAGNOSIS — J44.1 COPD EXACERBATION: ICD-10-CM

## 2020-08-29 LAB
ALBUMIN SERPL BCP-MCNC: 3.8 G/DL (ref 3.5–5.2)
ALP SERPL-CCNC: 85 U/L (ref 55–135)
ALT SERPL W/O P-5'-P-CCNC: 20 U/L (ref 10–44)
ANION GAP SERPL CALC-SCNC: 11 MMOL/L (ref 8–16)
AST SERPL-CCNC: 15 U/L (ref 10–40)
B-OH-BUTYR BLD STRIP-SCNC: 0 MMOL/L (ref 0–0.5)
BASOPHILS # BLD AUTO: 0.01 K/UL (ref 0–0.2)
BASOPHILS NFR BLD: 0.2 % (ref 0–1.9)
BILIRUB SERPL-MCNC: 0.3 MG/DL (ref 0.1–1)
BNP SERPL-MCNC: 53 PG/ML (ref 0–99)
BUN SERPL-MCNC: 14 MG/DL (ref 6–20)
CALCIUM SERPL-MCNC: 9.4 MG/DL (ref 8.7–10.5)
CHLORIDE SERPL-SCNC: 101 MMOL/L (ref 95–110)
CO2 SERPL-SCNC: 25 MMOL/L (ref 23–29)
CREAT SERPL-MCNC: 1.3 MG/DL (ref 0.5–1.4)
DIFFERENTIAL METHOD: ABNORMAL
EOSINOPHIL # BLD AUTO: 0.3 K/UL (ref 0–0.5)
EOSINOPHIL NFR BLD: 7.3 % (ref 0–8)
ERYTHROCYTE [DISTWIDTH] IN BLOOD BY AUTOMATED COUNT: 14.6 % (ref 11.5–14.5)
EST. GFR  (AFRICAN AMERICAN): >60 ML/MIN/1.73 M^2
EST. GFR  (NON AFRICAN AMERICAN): >60 ML/MIN/1.73 M^2
GLUCOSE SERPL-MCNC: 200 MG/DL (ref 70–110)
HCT VFR BLD AUTO: 42.9 % (ref 40–54)
HGB BLD-MCNC: 13.6 G/DL (ref 14–18)
IMM GRANULOCYTES # BLD AUTO: 0.01 K/UL (ref 0–0.04)
IMM GRANULOCYTES NFR BLD AUTO: 0.2 % (ref 0–0.5)
LYMPHOCYTES # BLD AUTO: 1.5 K/UL (ref 1–4.8)
LYMPHOCYTES NFR BLD: 31.9 % (ref 18–48)
MCH RBC QN AUTO: 26 PG (ref 27–31)
MCHC RBC AUTO-ENTMCNC: 31.7 G/DL (ref 32–36)
MCV RBC AUTO: 82 FL (ref 82–98)
MONOCYTES # BLD AUTO: 0.4 K/UL (ref 0.3–1)
MONOCYTES NFR BLD: 8.8 % (ref 4–15)
NEUTROPHILS # BLD AUTO: 2.4 K/UL (ref 1.8–7.7)
NEUTROPHILS NFR BLD: 51.6 % (ref 38–73)
NRBC BLD-RTO: 0 /100 WBC
PLATELET # BLD AUTO: 210 K/UL (ref 150–350)
PMV BLD AUTO: 9.3 FL (ref 9.2–12.9)
POTASSIUM SERPL-SCNC: 4.3 MMOL/L (ref 3.5–5.1)
PROT SERPL-MCNC: 7.4 G/DL (ref 6–8.4)
RBC # BLD AUTO: 5.24 M/UL (ref 4.6–6.2)
SARS-COV-2 RDRP RESP QL NAA+PROBE: NEGATIVE
SODIUM SERPL-SCNC: 137 MMOL/L (ref 136–145)
TROPONIN I SERPL DL<=0.01 NG/ML-MCNC: 0.02 NG/ML (ref 0–0.03)
WBC # BLD AUTO: 4.64 K/UL (ref 3.9–12.7)

## 2020-08-29 PROCEDURE — 84484 ASSAY OF TROPONIN QUANT: CPT

## 2020-08-29 PROCEDURE — U0002 COVID-19 LAB TEST NON-CDC: HCPCS

## 2020-08-29 PROCEDURE — 93010 ELECTROCARDIOGRAM REPORT: CPT | Mod: ,,, | Performed by: INTERNAL MEDICINE

## 2020-08-29 PROCEDURE — 96374 THER/PROPH/DIAG INJ IV PUSH: CPT

## 2020-08-29 PROCEDURE — 85025 COMPLETE CBC W/AUTO DIFF WBC: CPT

## 2020-08-29 PROCEDURE — 83930 ASSAY OF BLOOD OSMOLALITY: CPT

## 2020-08-29 PROCEDURE — 63600175 PHARM REV CODE 636 W HCPCS: Performed by: EMERGENCY MEDICINE

## 2020-08-29 PROCEDURE — 99285 EMERGENCY DEPT VISIT HI MDM: CPT | Mod: 25

## 2020-08-29 PROCEDURE — 80053 COMPREHEN METABOLIC PANEL: CPT

## 2020-08-29 PROCEDURE — 83880 ASSAY OF NATRIURETIC PEPTIDE: CPT

## 2020-08-29 PROCEDURE — 93010 EKG 12-LEAD: ICD-10-PCS | Mod: ,,, | Performed by: INTERNAL MEDICINE

## 2020-08-29 PROCEDURE — 93005 ELECTROCARDIOGRAM TRACING: CPT

## 2020-08-29 PROCEDURE — 82010 KETONE BODYS QUAN: CPT

## 2020-08-29 PROCEDURE — 25000242 PHARM REV CODE 250 ALT 637 W/ HCPCS: Performed by: EMERGENCY MEDICINE

## 2020-08-29 RX ORDER — PREDNISONE 20 MG/1
40 TABLET ORAL DAILY
Qty: 6 TABLET | Refills: 0 | Status: SHIPPED | OUTPATIENT
Start: 2020-08-29 | End: 2020-09-01

## 2020-08-29 RX ORDER — IPRATROPIUM BROMIDE AND ALBUTEROL SULFATE 2.5; .5 MG/3ML; MG/3ML
3 SOLUTION RESPIRATORY (INHALATION)
Status: COMPLETED | OUTPATIENT
Start: 2020-08-29 | End: 2020-08-29

## 2020-08-29 RX ORDER — METHYLPREDNISOLONE SOD SUCC 125 MG
125 VIAL (EA) INJECTION
Status: COMPLETED | OUTPATIENT
Start: 2020-08-29 | End: 2020-08-29

## 2020-08-29 RX ORDER — IPRATROPIUM BROMIDE AND ALBUTEROL SULFATE 2.5; .5 MG/3ML; MG/3ML
3 SOLUTION RESPIRATORY (INHALATION) EVERY 4 HOURS PRN
Qty: 1 BOX | Refills: 3 | Status: SHIPPED | OUTPATIENT
Start: 2020-08-29 | End: 2021-12-07 | Stop reason: SDUPTHER

## 2020-08-29 RX ADMIN — IPRATROPIUM BROMIDE AND ALBUTEROL SULFATE 3 ML: .5; 3 SOLUTION RESPIRATORY (INHALATION) at 10:08

## 2020-08-29 RX ADMIN — METHYLPREDNISOLONE SODIUM SUCCINATE 125 MG: 125 INJECTION, POWDER, FOR SOLUTION INTRAMUSCULAR; INTRAVENOUS at 10:08

## 2020-08-30 LAB — OSMOLALITY SERPL: 295 MOSM/KG (ref 280–300)

## 2020-08-30 NOTE — ED PROVIDER NOTES
Encounter Date: 2020       History     Chief Complaint   Patient presents with    Shortness of Breath     per ems patient reports sob x 1 day. Patient reports taking home duoneb without relief. Patient received duoneb by ems. initial o2 sat is 90 on room air.      55 yo male presents via [facility] with acute moderate shortness of breath. Patient took 2 breathing treatments at home but didn't feel better so he called EMS. EMS found patient with sats 94% and started breathing tx. Patient smoked cigarettes today. No chest pain. No recent fevers.     PMH: DM1, DKA, hep C, IVDU, pancreatitis  PSH: cardiac stent, EGD, R shoulder        Review of patient's allergies indicates:  No Known Allergies  Past Medical History:   Diagnosis Date    Acute on chronic pancreatitis 4/3/2016    DKA (diabetic ketoacidoses) 2020    DM (diabetes mellitus), type 1     HCV (hepatitis C virus)     high VL     HTN (hypertension)     Hypomagnesemia 2020    IVDU (intravenous drug user)     Heroin    Pancreatitis      Past Surgical History:   Procedure Laterality Date    CARDIAC SURGERY      stent placed. ([facility])    ESOPHAGOGASTRODUODENOSCOPY N/A 3/5/2020    Gastritis.  No H pylori.  Completed PPI for 1 month.  Procedure: EGD (ESOPHAGOGASTRODUODENOSCOPY);  Surgeon: Brinda Rene MD;  Location: [facility];  Service: Endoscopy;  Laterality: N/A;  W421 A; [id number]    SHOULDER SURGERY      right shoulder, spur removal.     Family History   Problem Relation Age of Onset    Asthma Mother      Social History     Tobacco Use    Smoking status: Current Every Day Smoker     Packs/day: 1.00     Years: 20.00     Pack years: 20.00     Types: Cigarettes     Start date: 4/3/1981     Last attempt to quit: 2020     Years since quittin.2    Smokeless tobacco: Never Used   Substance Use Topics    Alcohol use: Not Currently     Alcohol/week: 0.0 standard drinks    Drug use: Not Currently     Types: IV,  Marijuana, Heroin     Comment: currently on methadone      Review of Systems   Constitutional: Negative for fever.   HENT: Negative for sore throat.    Eyes: Negative for photophobia.   Respiratory: Positive for shortness of breath.    Cardiovascular: Negative for chest pain and leg swelling.   Gastrointestinal: Negative for abdominal pain.   Genitourinary: Negative for dysuria.   Musculoskeletal: Negative for neck stiffness.   Skin: Negative for rash.   Neurological: Negative for syncope.       Physical Exam     Initial Vitals [08/29/20 2131]   BP Pulse Resp Temp SpO2   136/82 103 (!) 26 98.6 °F (37 °C) 98 %      MAP       --         Physical Exam    Nursing note and vitals reviewed.  Constitutional: He appears well-developed and well-nourished. He is not diaphoretic. He appears distressed (mild respiratory).   Awake, alert middle-aged male.   HENT:   Head: Normocephalic and atraumatic.   Eyes: Conjunctivae and EOM are normal. Pupils are equal, round, and reactive to light.   Neck: Normal range of motion. Neck supple.   Cardiovascular: Regular rhythm, normal heart sounds and intact distal pulses.   No murmur heard.  Borderline tachycardic.   Pulmonary/Chest: He is in respiratory distress. He has wheezes. He has no rhonchi. He has no rales.   Abdominal: Soft. There is no abdominal tenderness.   Musculoskeletal: Normal range of motion. No tenderness or edema.   Neurological: He is alert and oriented to person, place, and time. He has normal strength.   Moving all extremities   Skin: Skin is warm and dry.   Track marks   Psychiatric: He has a normal mood and affect.         ED Course   Procedures  Labs Reviewed   COMPREHENSIVE METABOLIC PANEL - Abnormal; Notable for the following components:       Result Value    Glucose 200 (*)     All other components within normal limits   CBC W/ AUTO DIFFERENTIAL - Abnormal; Notable for the following components:    Hemoglobin 13.6 (*)     Mean Corpuscular Hemoglobin 26.0 (*)      Mean Corpuscular Hemoglobin Conc 31.7 (*)     RDW 14.6 (*)     All other components within normal limits   B-TYPE NATRIURETIC PEPTIDE   TROPONIN I   BETA - HYDROXYBUTYRATE, SERUM   SARS-COV-2 RNA AMPLIFICATION, QUAL   OSMOLALITY, SERUM     EKG Readings: (Independently Interpreted)   22:04: NSR, HR 99. R axis. Q waves in inferior leads. No STEMI.        Imaging Results          X-Ray Chest AP Portable (Final result)  Result time 08/29/20 22:09:46    Final result by Linh Gordon MD (08/29/20 22:09:46)                 Impression:      No acute cardiopulmonary process identified.      Electronically signed by: Linh Gordon MD  Date:    08/29/2020  Time:    22:09             Narrative:    EXAMINATION:  XR CHEST AP PORTABLE    CLINICAL HISTORY:  shortness of breath;    TECHNIQUE:  Single frontal view of the chest was performed.    COMPARISON:  Recent chest radiograph and CTA from 08/12/2020.    FINDINGS:  Cardiac silhouette is normal in size.  Lungs are symmetrically expanded.  Mild chronic lung changes and mild right basilar scarring are seen.  No evidence of new focal consolidative process, pneumothorax, or significant pleural effusion.  No acute osseous abnormality identified.                              X-Rays:   Independently Interpreted Readings:   Other Readings:  CXR NAD    Medical Decision Making:   History:   Old Medical Records: I decided to obtain old medical records.  Old Records Summarized: records from previous admission(s).  Initial Assessment:   54 y.o. male presents via EMS with shortness of breath.  Differential Diagnosis:   Ddx includes asthma exacerbation, pneumonia, ACS, CHF, PE, other.  Independently Interpreted Test(s):   I have ordered and independently interpreted X-rays - see prior notes.  I have ordered and independently interpreted EKG Reading(s) - see prior notes  Clinical Tests:   Lab Tests: Ordered and Reviewed  Radiological Study: Ordered and Reviewed  Medical Tests: Ordered and  Reviewed  ED Management:  EKG no STEMI.    CXR NAD.    Labs overall reassuring. Hyperglycemic without DKA. COVID-19 negative. Cardiac enzymes within normal.     Patient tx'ed here with solumedrol 125mg IV and duonebs x 3. On reassessment, his lungs were much clearer, though he had some residual wheezing. He did remain tachycardic which likely was due to albuterol. Patient stated he felt better and wished to go home. I have rx'ed him a prednisone burst course and refilled his albuterol. I advised PMD f/u and discussed return precautions.                                  Clinical Impression:       ICD-10-CM ICD-9-CM   1. Shortness of breath  R06.02 786.05   2. COPD exacerbation  J44.1 491.21   3. Wheezing  R06.2 786.07   4. Cough  R05 786.2             ED Disposition Condition    Discharge Stable        ED Prescriptions     Medication Sig Dispense Start Date End Date Auth. Provider    albuterol-ipratropium (DUO-NEB) 2.5 mg-0.5 mg/3 mL nebulizer solution Take 3 mLs by nebulization every 4 (four) hours as needed for Wheezing or Shortness of Breath. Rescue 1 Box 8/29/2020  Jennifer Perez MD    predniSONE (DELTASONE) 20 MG tablet Take 2 tablets (40 mg total) by mouth once daily. for 3 days 6 tablet 8/29/2020 9/1/2020 Jennifer Perez MD        Follow-up Information    None                                    Jennifer Perez MD  08/30/20 [id number]

## 2020-09-08 ENCOUNTER — HOSPITAL ENCOUNTER (EMERGENCY)
Facility: HOSPITAL | Age: 55
Discharge: HOME | End: 2020-09-08
Attending: EMERGENCY MEDICINE
Payer: MEDICARE

## 2020-09-08 VITALS
OXYGEN SATURATION: 98 % | BODY MASS INDEX: 21.35 KG/M2 | RESPIRATION RATE: 25 BRPM | TEMPERATURE: 99 F | HEART RATE: 87 BPM | DIASTOLIC BLOOD PRESSURE: 75 MMHG | SYSTOLIC BLOOD PRESSURE: 146 MMHG | HEIGHT: 67 IN | WEIGHT: 136 LBS

## 2020-09-08 DIAGNOSIS — F15.10 METHAMPHETAMINE ABUSE: Primary | ICD-10-CM

## 2020-09-08 DIAGNOSIS — R10.9 ABDOMINAL PAIN: ICD-10-CM

## 2020-09-08 DIAGNOSIS — F11.10 OPIATE ABUSE, CONTINUOUS: ICD-10-CM

## 2020-09-08 LAB
ALBUMIN SERPL BCP-MCNC: 3.9 G/DL (ref 3.5–5.2)
ALP SERPL-CCNC: 84 U/L (ref 55–135)
ALT SERPL W/O P-5'-P-CCNC: 26 U/L (ref 10–44)
ANION GAP SERPL CALC-SCNC: 11 MMOL/L (ref 8–16)
AST SERPL-CCNC: 26 U/L (ref 10–40)
BASOPHILS # BLD AUTO: 0.02 K/UL (ref 0–0.2)
BASOPHILS NFR BLD: 0.3 % (ref 0–1.9)
BILIRUB SERPL-MCNC: 0.5 MG/DL (ref 0.1–1)
BUN SERPL-MCNC: 18 MG/DL (ref 6–20)
CALCIUM SERPL-MCNC: 9.6 MG/DL (ref 8.7–10.5)
CHLORIDE SERPL-SCNC: 100 MMOL/L (ref 95–110)
CO2 SERPL-SCNC: 24 MMOL/L (ref 23–29)
CREAT SERPL-MCNC: 1.7 MG/DL (ref 0.5–1.4)
DIFFERENTIAL METHOD: ABNORMAL
EOSINOPHIL # BLD AUTO: 0.3 K/UL (ref 0–0.5)
EOSINOPHIL NFR BLD: 5.1 % (ref 0–8)
ERYTHROCYTE [DISTWIDTH] IN BLOOD BY AUTOMATED COUNT: 14.8 % (ref 11.5–14.5)
EST. GFR  (AFRICAN AMERICAN): 52 ML/MIN/1.73 M^2
EST. GFR  (NON AFRICAN AMERICAN): 45 ML/MIN/1.73 M^2
GLUCOSE SERPL-MCNC: 167 MG/DL (ref 70–110)
HCT VFR BLD AUTO: 39.2 % (ref 40–54)
HGB BLD-MCNC: 12.4 G/DL (ref 14–18)
IMM GRANULOCYTES # BLD AUTO: 0.04 K/UL (ref 0–0.04)
IMM GRANULOCYTES NFR BLD AUTO: 0.6 % (ref 0–0.5)
LIPASE SERPL-CCNC: 37 U/L (ref 4–60)
LYMPHOCYTES # BLD AUTO: 0.9 K/UL (ref 1–4.8)
LYMPHOCYTES NFR BLD: 14.2 % (ref 18–48)
MAGNESIUM SERPL-MCNC: 2.1 MG/DL (ref 1.6–2.6)
MCH RBC QN AUTO: 26.3 PG (ref 27–31)
MCHC RBC AUTO-ENTMCNC: 31.6 G/DL (ref 32–36)
MCV RBC AUTO: 83 FL (ref 82–98)
MONOCYTES # BLD AUTO: 1 K/UL (ref 0.3–1)
MONOCYTES NFR BLD: 15 % (ref 4–15)
NEUTROPHILS # BLD AUTO: 4.2 K/UL (ref 1.8–7.7)
NEUTROPHILS NFR BLD: 64.8 % (ref 38–73)
NRBC BLD-RTO: 0 /100 WBC
PLATELET # BLD AUTO: 295 K/UL (ref 150–350)
PMV BLD AUTO: 9.7 FL (ref 9.2–12.9)
POTASSIUM SERPL-SCNC: 4.3 MMOL/L (ref 3.5–5.1)
PROT SERPL-MCNC: 8.3 G/DL (ref 6–8.4)
RBC # BLD AUTO: 4.72 M/UL (ref 4.6–6.2)
SODIUM SERPL-SCNC: 135 MMOL/L (ref 136–145)
TROPONIN I SERPL DL<=0.01 NG/ML-MCNC: 0.01 NG/ML (ref 0–0.03)
WBC # BLD AUTO: 6.47 K/UL (ref 3.9–12.7)

## 2020-09-08 PROCEDURE — 84484 ASSAY OF TROPONIN QUANT: CPT

## 2020-09-08 PROCEDURE — 96372 THER/PROPH/DIAG INJ SC/IM: CPT | Mod: 59

## 2020-09-08 PROCEDURE — 96374 THER/PROPH/DIAG INJ IV PUSH: CPT

## 2020-09-08 PROCEDURE — 80053 COMPREHEN METABOLIC PANEL: CPT

## 2020-09-08 PROCEDURE — 94640 AIRWAY INHALATION TREATMENT: CPT

## 2020-09-08 PROCEDURE — 25000242 PHARM REV CODE 250 ALT 637 W/ HCPCS: Performed by: EMERGENCY MEDICINE

## 2020-09-08 PROCEDURE — 99285 EMERGENCY DEPT VISIT HI MDM: CPT | Mod: 25

## 2020-09-08 PROCEDURE — 83735 ASSAY OF MAGNESIUM: CPT

## 2020-09-08 PROCEDURE — 85025 COMPLETE CBC W/AUTO DIFF WBC: CPT

## 2020-09-08 PROCEDURE — 83690 ASSAY OF LIPASE: CPT

## 2020-09-08 PROCEDURE — 93010 EKG 12-LEAD: ICD-10-PCS | Mod: ,,, | Performed by: INTERNAL MEDICINE

## 2020-09-08 PROCEDURE — 63600175 PHARM REV CODE 636 W HCPCS: Performed by: EMERGENCY MEDICINE

## 2020-09-08 PROCEDURE — 96375 TX/PRO/DX INJ NEW DRUG ADDON: CPT

## 2020-09-08 PROCEDURE — 93005 ELECTROCARDIOGRAM TRACING: CPT

## 2020-09-08 PROCEDURE — 93010 ELECTROCARDIOGRAM REPORT: CPT | Mod: ,,, | Performed by: INTERNAL MEDICINE

## 2020-09-08 RX ORDER — NALOXONE HYDROCHLORIDE 4 MG/.1ML
SPRAY NASAL
Qty: 1 EACH | Refills: 11 | Status: SHIPPED | OUTPATIENT
Start: 2020-09-08 | End: 2020-11-20 | Stop reason: SDUPTHER

## 2020-09-08 RX ORDER — IPRATROPIUM BROMIDE AND ALBUTEROL SULFATE 2.5; .5 MG/3ML; MG/3ML
3 SOLUTION RESPIRATORY (INHALATION)
Status: COMPLETED | OUTPATIENT
Start: 2020-09-08 | End: 2020-09-08

## 2020-09-08 RX ORDER — DICYCLOMINE HYDROCHLORIDE 10 MG/ML
20 INJECTION INTRAMUSCULAR
Status: COMPLETED | OUTPATIENT
Start: 2020-09-08 | End: 2020-09-08

## 2020-09-08 RX ORDER — LORAZEPAM 2 MG/ML
1 INJECTION INTRAMUSCULAR
Status: COMPLETED | OUTPATIENT
Start: 2020-09-08 | End: 2020-09-08

## 2020-09-08 RX ORDER — ONDANSETRON 2 MG/ML
4 INJECTION INTRAMUSCULAR; INTRAVENOUS
Status: COMPLETED | OUTPATIENT
Start: 2020-09-08 | End: 2020-09-08

## 2020-09-08 RX ADMIN — IPRATROPIUM BROMIDE AND ALBUTEROL SULFATE 3 ML: .5; 3 SOLUTION RESPIRATORY (INHALATION) at 09:09

## 2020-09-08 RX ADMIN — ONDANSETRON 4 MG: 2 INJECTION INTRAMUSCULAR; INTRAVENOUS at 09:09

## 2020-09-08 RX ADMIN — LORAZEPAM 1 MG: 2 INJECTION INTRAMUSCULAR; INTRAVENOUS at 09:09

## 2020-09-08 RX ADMIN — DICYCLOMINE HYDROCHLORIDE 20 MG: 20 INJECTION, SOLUTION INTRAMUSCULAR at 08:09

## 2020-09-09 NOTE — ED PROVIDER NOTES
Encounter Date: 9/8/2020    SCRIBE #1 NOTE: I, Cary Borges, am scribing for, and in the presence of,  Jeff Shepherd MD. I have scribed the following portions of the note - Other sections scribed: HPI, ROS, PE.       History     Chief Complaint   Patient presents with    Hyperglycemia     sister called after finding pt altered, responsive to painful stimuli upon EMS arrival, CBG of 41, given D50, repeat of 138    Drug / Alcohol Assessment     after cbg of 138 pt still lethargic and nodding off, pt given 0.5 mg narcan, pt arrives to ED awake, alert and yelling loudly, pt hx of drug use      CC: Drug Overdose    Patient is a 55 year old male with a SHx of IVDU and PMHx of DM, COPD, and diabetic neuropathy who presents to the ED, per EMS, for a drug overdose that occurred this evening. Per EMS, patient's sister found him unresponsive and contacted EMS. EMS noted pinpoint pupils on arrival. CBG was 41 mg/dL. Patient was still drowsy after receiving D50. He received 0.5 mg of Narcan with positive response.  He is now very anxious.  No reported trauma. Patient admits to using Heroin and Methamphetamine this afternoon. Reports associated abdominal pain and nausea since receiving the Narcan. No emesis or diarrhea. No chest pain. No headache. Patient states he felt fine prior to using drugs. He also complains of pain to bilateral feet that is consistent with Hx of diabetic neuropathy. Denies cough, sore throat, congestion, fever, back, and any other associated symptoms.     The history is provided by the patient and the EMS personnel.     Review of patient's allergies indicates:  No Known Allergies  Past Medical History:   Diagnosis Date    Acute on chronic pancreatitis 4/3/2016    DKA (diabetic ketoacidoses) 02/2020    DM (diabetes mellitus), type 1     HCV (hepatitis C virus)     high VL     HTN (hypertension)     Hypomagnesemia 5/25/2020    IVDU (intravenous drug user)     Heroin    Pancreatitis      Past  Surgical History:   Procedure Laterality Date    CARDIAC SURGERY      stent placed. ([facility])    ESOPHAGOGASTRODUODENOSCOPY N/A 3/5/2020    Gastritis.  No H pylori.  Completed PPI for 1 month.  Procedure: EGD (ESOPHAGOGASTRODUODENOSCOPY);  Surgeon: Brinda Rene MD;  Location: [facility];  Service: Endoscopy;  Laterality: N/A;  W421 A; x5445    SHOULDER SURGERY      right shoulder, spur removal.     Family History   Problem Relation Age of Onset    Asthma Mother      Social History     Tobacco Use    Smoking status: Current Every Day Smoker     Packs/day: 1.00     Years: 20.00     Pack years: 20.00     Types: Cigarettes     Start date: 4/3/1981     Last attempt to quit: 2020     Years since quittin.2    Smokeless tobacco: Never Used   Substance Use Topics    Alcohol use: Not Currently     Alcohol/week: 0.0 standard drinks    Drug use: Yes     Types: IV, Marijuana, Heroin, Methamphetamines     Comment: currently on methadone      Review of Systems   Constitutional: Negative for chills, diaphoresis and fever.   HENT: Negative for congestion and sore throat.    Eyes: Negative.    Respiratory: Negative for cough and shortness of breath.    Cardiovascular: Negative for chest pain and palpitations.   Gastrointestinal: Positive for abdominal pain and nausea. Negative for diarrhea and vomiting.        NO melena or rectal bleeding   Genitourinary: Negative for flank pain.   Musculoskeletal: Negative for back pain and neck pain.        + Pain to feet bilaterally; Chronic   Skin: Negative for rash and wound.   Neurological: Negative for syncope and headaches.        No numbness   Psychiatric/Behavioral: Negative for confusion. The patient is nervous/anxious.    All other systems reviewed and are negative.      Physical Exam     Initial Vitals   BP Pulse Resp Temp SpO2   20 2043 20 2120 20   138/79 (!) 118 (!) 22 100 °F (37.8 °C) 95 %       MAP       --                Physical Exam    Nursing note and vitals reviewed.  Constitutional: He appears well-developed and well-nourished. He is not diaphoretic. No distress.   HENT:   Head: Normocephalic and atraumatic.   Nose: Nose normal.   Mouth/Throat: Oropharynx is clear and moist.   Eyes: Conjunctivae and EOM are normal. Pupils are equal, round, and reactive to light. Right eye exhibits no discharge. Left eye exhibits no discharge. No scleral icterus.   Neck: Normal range of motion. Neck supple. No tracheal deviation present. No JVD present.   Cardiovascular: Regular rhythm and normal heart sounds. Tachycardia present.    No murmur heard.  Pulmonary/Chest: No stridor. No respiratory distress. He has wheezes. He has no rhonchi. He has no rales. He exhibits no tenderness.   Abdominal: Soft. He exhibits no distension and no mass. There is abdominal tenderness. There is no rebound and no guarding.   Mild diffuse abdominal tenderness.   Musculoskeletal: Normal range of motion. No tenderness or edema.   Neurological: He is alert and oriented to person, place, and time. He has normal strength. No cranial nerve deficit. GCS score is 15. GCS eye subscore is 4. GCS verbal subscore is 5. GCS motor subscore is 6.   Skin: Skin is warm and dry. No rash noted.   Psychiatric: His behavior is normal. Judgment and thought content normal.   Patient is anxious.  Increased psychomotor activity.  No active delusions or hallucinations.  No suicidal or homicidal ideations.         ED Course   Procedures  Labs Reviewed   CBC W/ AUTO DIFFERENTIAL - Abnormal; Notable for the following components:       Result Value    Hemoglobin 12.4 (*)     Hematocrit 39.2 (*)     Mean Corpuscular Hemoglobin 26.3 (*)     Mean Corpuscular Hemoglobin Conc 31.6 (*)     RDW 14.8 (*)     Immature Granulocytes 0.6 (*)     Lymph # 0.9 (*)     Lymph% 14.2 (*)     All other components within normal limits   COMPREHENSIVE METABOLIC PANEL - Abnormal;  Notable for the following components:    Sodium 135 (*)     Glucose 167 (*)     Creatinine 1.7 (*)     eGFR if  52 (*)     eGFR if non  45 (*)     All other components within normal limits   TROPONIN I   LIPASE   MAGNESIUM     EKG Readings: (Independently Interpreted)   Initial Reading: No STEMI. Rhythm: Sinus Tachycardia. Heart Rate: 109. Ectopy: No Ectopy. Conduction: Normal. ST Segments: Normal ST Segments. T Waves: Normal. Axis: Normal.       Imaging Results          X-Ray Chest AP Portable (Final result)  Result time 09/08/20 22:01:04    Final result by Darren Mathew MD (09/08/20 22:01:04)                 Impression:      No significant detrimental interval change compared to prior study of 08/29/2020.      Electronically signed by: Darren Mathew MD  Date:    09/08/2020  Time:    22:01             Narrative:    EXAMINATION:  XR CHEST AP PORTABLE    CLINICAL HISTORY:  Chest Pain;    TECHNIQUE:  Single frontal view of the chest was performed.    COMPARISON:  08/29/2020    FINDINGS:  Cardiac monitoring leads overlie the chest.  Cardiomediastinal silhouette does not appear significantly enlarged.  The lungs are symmetrically expanded.  There are coarse bibasilar interstitial lung markings again demonstrated, similar to prior study.  No new large confluent airspace consolidation identified.  There is stable blunting of the right costophrenic angle.  No definite pneumothorax appreciated.  Osseous structures appear grossly intact.                                 Medical Decision Making:   History:   Old Medical Records: I decided to obtain old medical records.  Initial Assessment:   Patient presents for evaluation decreased responsiveness after IV drug abuse.  Patient was also hyperglycemic.  Patient now alert after receiving D50 Narcan.  Patient complains abdominal pain and nausea.  Will treat symptomatically.  Will check basic lab work.  Mild tenderness diffusely on abdominal  exam.  no peritoneal signs.  Suspect patient's symptoms are due to narcotic withdrawal and amphetamine abuse.  Independently Interpreted Test(s):   I have ordered and independently interpreted EKG Reading(s) - see prior notes  Clinical Tests:   Lab Tests: Ordered and Reviewed  The following lab test(s) were unremarkable: CBC and CMP  Radiological Study: Ordered and Reviewed  Medical Tests: Ordered and Reviewed  ED Management:  2220:  Mental status improved.  Patient wishes to be discharged.  patient stable.  No significant lab abnormalities when compared to previous.            Scribe Attestation:   Scribe #1: I performed the above scribed service and the documentation accurately describes the services I performed. I attest to the accuracy of the note.                      Clinical Impression:       ICD-10-CM ICD-9-CM   1. Methamphetamine abuse  F15.10 305.70   2. Abdominal pain  R10.9 789.00   3. Opiate abuse, continuous  F11.10 305.51         Disposition:   Disposition: Discharged  Condition: Stable     ED Disposition Condition    Discharge Stable        ED Prescriptions     Medication Sig Dispense Start Date End Date Auth. Provider    naloxone (NARCAN) 4 mg/actuation Spry 4mg by nasal route as needed for opioid overdose; may repeat every 2-3 minutes in alternating nostrils until medical help arrives. Call 911 1 each 9/8/2020  Jeff Shepherd MD        Follow-up Information     Follow up With Specialties Details Why Contact Info    Cullen Zimmer MD Internal Medicine Call  As needed [address]  [address]  [facility] [id number] [phone]                              I, Jeff Shepherd MD, personally performed the services described in this documentation. All medical record entries made by the scribe were at my direction and in my presence.  I have reviewed the chart and agree that the record reflects my personal performance and is accurate and complete.           Jeff Shepherd MD  09/08/20 [id number]

## 2020-09-09 NOTE — ED TRIAGE NOTES
Pt presents to ED via EMS from home after sister called after finding pt altered. Upon EMS arrival pt was found responsive to painful stimuli only. CBG of 41, given 12.5mg of  D50, repeat of 138 after D50 cbg of 138. Pt was still  still lethargic and nodding off and was given 0.5 mg narcan. Pt arrives to ED awake, alert and yelling loudly and cursing at staff that he is in pain.

## 2020-09-09 NOTE — ED TRIAGE NOTES
sister called after finding pt altered, responsive to painful stimuli upon EMS arrival, CBG of 41, given D50, repeat of 138

## 2020-09-23 ENCOUNTER — HOSPITAL ENCOUNTER (EMERGENCY)
Facility: HOSPITAL | Age: 55
Discharge: LEFT BEFORE DISCHARGE | End: 2020-09-23
Attending: EMERGENCY MEDICINE
Payer: MEDICARE

## 2020-09-23 VITALS
HEIGHT: 72 IN | WEIGHT: 140 LBS | SYSTOLIC BLOOD PRESSURE: 116 MMHG | TEMPERATURE: 99 F | RESPIRATION RATE: 18 BRPM | HEART RATE: 98 BPM | OXYGEN SATURATION: 98 % | BODY MASS INDEX: 18.96 KG/M2 | DIASTOLIC BLOOD PRESSURE: 66 MMHG

## 2020-09-23 DIAGNOSIS — I25.10 CORONARY ARTERY DISEASE, ANGINA PRESENCE UNSPECIFIED, UNSPECIFIED VESSEL OR LESION TYPE, UNSPECIFIED WHETHER NATIVE OR TRANSPLANTED HEART: ICD-10-CM

## 2020-09-23 DIAGNOSIS — I24.9 ACS (ACUTE CORONARY SYNDROME): Primary | ICD-10-CM

## 2020-09-23 DIAGNOSIS — R06.02 SOB (SHORTNESS OF BREATH): ICD-10-CM

## 2020-09-23 DIAGNOSIS — I50.9 CONGESTIVE HEART FAILURE, UNSPECIFIED HF CHRONICITY, UNSPECIFIED HEART FAILURE TYPE: ICD-10-CM

## 2020-09-23 DIAGNOSIS — I21.4 NSTEMI (NON-ST ELEVATED MYOCARDIAL INFARCTION): ICD-10-CM

## 2020-09-23 LAB
ALBUMIN SERPL BCP-MCNC: 3.5 G/DL (ref 3.5–5.2)
ALP SERPL-CCNC: 89 U/L (ref 55–135)
ALT SERPL W/O P-5'-P-CCNC: 52 U/L (ref 10–44)
ANION GAP SERPL CALC-SCNC: 10 MMOL/L (ref 8–16)
AST SERPL-CCNC: 26 U/L (ref 10–40)
B-OH-BUTYR BLD STRIP-SCNC: 0.1 MMOL/L (ref 0–0.5)
BASOPHILS # BLD AUTO: 0.03 K/UL (ref 0–0.2)
BASOPHILS NFR BLD: 0.5 % (ref 0–1.9)
BILIRUB SERPL-MCNC: 0.3 MG/DL (ref 0.1–1)
BNP SERPL-MCNC: 202 PG/ML (ref 0–99)
BUN SERPL-MCNC: 18 MG/DL (ref 6–20)
CALCIUM SERPL-MCNC: 8.7 MG/DL (ref 8.7–10.5)
CHLORIDE SERPL-SCNC: 99 MMOL/L (ref 95–110)
CO2 SERPL-SCNC: 26 MMOL/L (ref 23–29)
CREAT SERPL-MCNC: 1.2 MG/DL (ref 0.5–1.4)
D DIMER PPP IA.FEU-MCNC: 0.38 MG/L FEU
DIFFERENTIAL METHOD: ABNORMAL
EOSINOPHIL # BLD AUTO: 0.6 K/UL (ref 0–0.5)
EOSINOPHIL NFR BLD: 10.6 % (ref 0–8)
ERYTHROCYTE [DISTWIDTH] IN BLOOD BY AUTOMATED COUNT: 14.8 % (ref 11.5–14.5)
EST. GFR  (AFRICAN AMERICAN): >60 ML/MIN/1.73 M^2
EST. GFR  (NON AFRICAN AMERICAN): >60 ML/MIN/1.73 M^2
GLUCOSE SERPL-MCNC: 144 MG/DL (ref 70–110)
HCT VFR BLD AUTO: 34.1 % (ref 40–54)
HGB BLD-MCNC: 10.7 G/DL (ref 14–18)
IMM GRANULOCYTES # BLD AUTO: 0.02 K/UL (ref 0–0.04)
IMM GRANULOCYTES NFR BLD AUTO: 0.3 % (ref 0–0.5)
LYMPHOCYTES # BLD AUTO: 1.5 K/UL (ref 1–4.8)
LYMPHOCYTES NFR BLD: 24.9 % (ref 18–48)
MCH RBC QN AUTO: 25.8 PG (ref 27–31)
MCHC RBC AUTO-ENTMCNC: 31.4 G/DL (ref 32–36)
MCV RBC AUTO: 82 FL (ref 82–98)
MONOCYTES # BLD AUTO: 0.8 K/UL (ref 0.3–1)
MONOCYTES NFR BLD: 13.1 % (ref 4–15)
NEUTROPHILS # BLD AUTO: 3 K/UL (ref 1.8–7.7)
NEUTROPHILS NFR BLD: 50.6 % (ref 38–73)
NRBC BLD-RTO: 0 /100 WBC
PLATELET # BLD AUTO: 255 K/UL (ref 150–350)
PMV BLD AUTO: 10.1 FL (ref 9.2–12.9)
POCT GLUCOSE: 157 MG/DL (ref 70–110)
POTASSIUM SERPL-SCNC: 4.5 MMOL/L (ref 3.5–5.1)
PROT SERPL-MCNC: 7.6 G/DL (ref 6–8.4)
RBC # BLD AUTO: 4.15 M/UL (ref 4.6–6.2)
SODIUM SERPL-SCNC: 135 MMOL/L (ref 136–145)
TROPONIN I SERPL DL<=0.01 NG/ML-MCNC: 0.51 NG/ML (ref 0–0.03)
WBC # BLD AUTO: 5.94 K/UL (ref 3.9–12.7)

## 2020-09-23 PROCEDURE — 25000242 PHARM REV CODE 250 ALT 637 W/ HCPCS: Performed by: EMERGENCY MEDICINE

## 2020-09-23 PROCEDURE — 80053 COMPREHEN METABOLIC PANEL: CPT

## 2020-09-23 PROCEDURE — 83880 ASSAY OF NATRIURETIC PEPTIDE: CPT

## 2020-09-23 PROCEDURE — 93010 EKG 12-LEAD: ICD-10-PCS | Mod: ,,, | Performed by: INTERNAL MEDICINE

## 2020-09-23 PROCEDURE — 85379 FIBRIN DEGRADATION QUANT: CPT

## 2020-09-23 PROCEDURE — 96374 THER/PROPH/DIAG INJ IV PUSH: CPT

## 2020-09-23 PROCEDURE — 84484 ASSAY OF TROPONIN QUANT: CPT

## 2020-09-23 PROCEDURE — 93010 ELECTROCARDIOGRAM REPORT: CPT | Mod: ,,, | Performed by: INTERNAL MEDICINE

## 2020-09-23 PROCEDURE — 99284 EMERGENCY DEPT VISIT MOD MDM: CPT | Mod: 25

## 2020-09-23 PROCEDURE — 85025 COMPLETE CBC W/AUTO DIFF WBC: CPT

## 2020-09-23 PROCEDURE — 63600175 PHARM REV CODE 636 W HCPCS: Performed by: EMERGENCY MEDICINE

## 2020-09-23 PROCEDURE — 94640 AIRWAY INHALATION TREATMENT: CPT

## 2020-09-23 PROCEDURE — 93005 ELECTROCARDIOGRAM TRACING: CPT

## 2020-09-23 PROCEDURE — 25000003 PHARM REV CODE 250: Performed by: EMERGENCY MEDICINE

## 2020-09-23 PROCEDURE — 82010 KETONE BODYS QUAN: CPT

## 2020-09-23 RX ORDER — NAPROXEN SODIUM 220 MG/1
81 TABLET, FILM COATED ORAL DAILY
Qty: 360 TABLET | Refills: 0 | Status: ON HOLD | OUTPATIENT
Start: 2020-09-23 | End: 2022-09-20

## 2020-09-23 RX ORDER — NAPROXEN SODIUM 220 MG/1
81 TABLET, FILM COATED ORAL
Status: COMPLETED | OUTPATIENT
Start: 2020-09-23 | End: 2020-09-23

## 2020-09-23 RX ORDER — NITROGLYCERIN 0.4 MG/1
0.4 TABLET SUBLINGUAL EVERY 5 MIN PRN
Qty: 100 TABLET | Refills: 0 | Status: ON HOLD | OUTPATIENT
Start: 2020-09-23 | End: 2023-08-30 | Stop reason: HOSPADM

## 2020-09-23 RX ORDER — IPRATROPIUM BROMIDE AND ALBUTEROL SULFATE 2.5; .5 MG/3ML; MG/3ML
9 SOLUTION RESPIRATORY (INHALATION) CONTINUOUS
Status: DISPENSED | OUTPATIENT
Start: 2020-09-23 | End: 2020-09-23

## 2020-09-23 RX ORDER — DEXAMETHASONE SODIUM PHOSPHATE 4 MG/ML
6 INJECTION, SOLUTION INTRA-ARTICULAR; INTRALESIONAL; INTRAMUSCULAR; INTRAVENOUS; SOFT TISSUE
Status: COMPLETED | OUTPATIENT
Start: 2020-09-23 | End: 2020-09-23

## 2020-09-23 RX ADMIN — DEXAMETHASONE SODIUM PHOSPHATE 6 MG: 4 INJECTION, SOLUTION INTRA-ARTICULAR; INTRALESIONAL; INTRAMUSCULAR; INTRAVENOUS; SOFT TISSUE at 08:09

## 2020-09-23 RX ADMIN — IPRATROPIUM BROMIDE AND ALBUTEROL SULFATE 9 ML: .5; 3 SOLUTION RESPIRATORY (INHALATION) at 08:09

## 2020-09-23 RX ADMIN — ASPIRIN 81 MG 81 MG: 81 TABLET ORAL at 10:09

## 2020-09-24 NOTE — ED PROVIDER NOTES
EM PHYSICIAN NOTE    HPI  This patient presents with a complaint of shortness of breat that started one hour ago.  Chief Complaint   Patient presents with    Shortness of Breath     EMS reports pt c/o chronic shortness of breath worse today. unrelieved by home nebulizuer treatments. 1 duo neb given with EMS with some relief. initial O2 sats of  89%       HPI: Cali Mabry is an 55 y.o. male presenting to the ED via EMS complaining of a sudden onset of worsening shortness of breath that started one hour ago. Patient reports symptoms of upper abdominal pain. Patient denies fever. Patient reports prior attempt at treatment via home nebulizer without relief. EMS reports prior attempt at treatment en route via one duo nebulizer with some relief. Patient had a past medical history of diabetes mellitus, pleural effusion, and intravenous drug use. Patient has a past surgical history of cardiac surgery. No known drug allergies.       REVIEW of PMH, SOC History and Family History:  Past Medical History:   Diagnosis Date    Acute on chronic pancreatitis 4/3/2016    DKA (diabetic ketoacidoses) 02/2020    DM (diabetes mellitus), type 1     HCV (hepatitis C virus)     high VL     HTN (hypertension)     Hypomagnesemia 5/25/2020    IVDU (intravenous drug user)     Heroin    Pancreatitis      Past Surgical History:   Procedure Laterality Date    CARDIAC SURGERY  1999    stent placed. ([facility])    ESOPHAGOGASTRODUODENOSCOPY N/A 3/5/2020    Gastritis.  No H pylori.  Completed PPI for 1 month.  Procedure: EGD (ESOPHAGOGASTRODUODENOSCOPY);  Surgeon: Brinda Rene MD;  Location: [facility];  Service: Endoscopy;  Laterality: N/A;  W421 A; [id number]    SHOULDER SURGERY  2013    right shoulder, spur removal.     Review of patient's allergies indicates:  No Known Allergies    Family history and social history reviewed.      REVIEW of SYSTEMS  Source: Patient  The nurse's notes and triage vital signs were  reviewed.  GENERAL/CONSTITUTIONAL: There is no report of fever, fatigue, weakness, or unexplained weight loss.  CARDIOVASCULAR: There is no report of chest pain but there is epigastric pain   RESPIRATORY: See HPI  GASTROINTESTINAL: See HPI  MUSCULOSKELETAL: There is no report of joint or muscle pain. No muscle weakness or tenderness.  SKIN AND BREASTS: There is no report of easy bruising, skin redness, skin rash.  HEMATOLOGIC/LYMPHATIC: There is no report of anemia, bleeding or clotting defects. There is no report of anticoagulant use.  The remainder of the ROS is negative.        PHYSICAL EXAMINATION    ED Triage Vitals [09/23/20 1834]   Enc Vitals Group      /80      Pulse (!) 115      Resp 20      Temp       Temp src Oral      SpO2 99 %      Weight 140 lb      Height 6'      Head Circumference       Peak Flow       Pain Score       Pain Loc       Pain Edu?       Excl. in GC?      Vital signs and Pulse Ox reviewed in clinical context. Abnormalities noted:   Pt's level of consciousness is alert, and the patient is in mild distress.  GCS: Glascow coma scale eye:4, Glascow coma scale motor:6, Glascow coma scale verbal:5 = 15  Skin: warm, pink and dry  Mucosa: Very dry mucosa  Head and Neck: JVD  Cardiac exam: Unable to appreciate if there is murmur due to wheezing  Pulmonary exam: High pitched expiratory wheezing.  Abd Exam: soft nontender   Musculoskeletal: No peripheral edema. +1 pulses but feet are warm.   Neurologic: GCS 15. 5 over 5 strength, cranial nerves intact, neck supple       Initial Impression:  Bronchospasm with JVD  Plan:  Rule out ACS, albuterol nebs and Decadron, reassess  Candice Li MD, 7:00 PM 9/23/2020      Medical decision making:   Nurses notes and Vital Signs reviewed.  Orders Placed This Encounter   Procedures    X-Ray Chest AP Portable    Beta-Hydroxybutyrate, Serum    Comprehensive metabolic panel    CBC auto differential    Urinalysis    Troponin I    Brain natriuretic  peptide    COVID-19 Rapid Screening    Drug screen panel, emergency    D dimer, quantitative    Ambulatory referral/consult to Cardiology    EKG 12-lead       ED Course as of Sep 23 2241   Wed Sep 23, 2020   1954 MyIndependent interpretation of the EKG is normal sinus rhythm, sinus tach at 107 There is no ST segment elevation or depression.  Compared to the old EKG there is no significant changes    [MH]   1955 Chest x-ray is within normal limits    []   2110 CMP reveals a mild transaminitis.  Glucose is mildly elevated but there is no evidence of DKA.    []   2110 CBC reviewed there is mild anemia at 10 and 34.  Platelets and leukocytes are normal.    [MH]   2226 BNP elevated at 202.    [MH]   2227 TroponinElevated at 0.5    []   2234 Patient is requesting discharge.  I had a long discussion with them about the abnormalities on his labs and my concern for a heart attack.  He demonstrates the capacity understand the risks and to make decisions about his health.  He lives with his mother and will inform her of my concerns and if he develops any new symptoms he will call 911.  Again I urged him to return at any point if he changes his mind.    []      ED Course User Index  [] Candice Li MD       Diagnoses that have been ruled out:   None   Diagnoses that are still under consideration:   None   Final diagnoses:   SOB (shortness of breath)   ACS (acute coronary syndrome)   NSTEMI (non-ST elevated myocardial infarction)   Congestive heart failure, unspecified HF chronicity, unspecified heart failure type   Coronary artery disease, angina presence unspecified, unspecified vessel or lesion type, unspecified whether native or transplanted heart            Follow-up Information    None       ED Prescriptions     Medication Sig Dispense Start Date End Date Auth. Provider    aspirin 81 MG Chew Take 1 tablet (81 mg total) by mouth once daily. 360 tablet 9/23/2020 9/23/2021 Candice Li MD     nitroGLYCERIN (NITROSTAT) 0.4 MG SL tablet Place 1 tablet (0.4 mg total) under the tongue every 5 (five) minutes as needed for Chest pain. 100 tablet 9/23/2020 9/23/2021 Candice Li MD          This note was created using Dictation Software.  This program may occasionally misinterpret certain words and phrases.      SCRIBE ATTESTATION NOTE:  I attest that I personally performed the services documented by the scribe and acknowledged and confirm the content of the note.   Nurses notes were reviewed.  Candice Li          Nurses notes were reviewed.      CRITICAL CARE TIME:  Based on this patient's presenting history and physical exam, this patient had high probability of sudden, clinically significant deterioration and the services I provided to this patient were to treat and/or prevent clinically significant deterioration. Coordination of care with other physicians  Review and interpretation of radiologic studies with re-assessment of plan of care  Review and interpretation of laboratory studies with re-assessment of plan of care  Review of diagnosis, treatment options and prognosis with patient. Aggregate critical care time was approximately  50 minutes, which includes only time during which I was engaged in work directly related to the patient's care, as described above, whether at the bedside or elsewhere in the Emergency Department.  It did not include time spent performing other reported procedures or the services of residents, students, nurses or physician assistants.             Candice Li MD  09/23/20 2320

## 2020-09-24 NOTE — DISCHARGE INSTRUCTIONS
As we discussed, I am very concerned about you leaving and not being monitored for your heart overnight.  If you develop new symptoms or change of mind we encourage you to come back right away.

## 2020-09-24 NOTE — ED NOTES
Received report from RITA Arreguin; pt in bed, bed locked and in lowest position, call light in reach, will cont to monitor.

## 2020-09-24 NOTE — ED NOTES
Pt ambulated well with strong steady gait, in no acute distress, AAO x 4, verbalized understanding/ risk factors of AMA.

## 2020-09-27 ENCOUNTER — HOSPITAL ENCOUNTER (OUTPATIENT)
Facility: HOSPITAL | Age: 55
Discharge: HOME | End: 2020-09-28
Attending: EMERGENCY MEDICINE | Admitting: HOSPITALIST
Payer: MEDICARE

## 2020-09-27 DIAGNOSIS — R06.02 SOB (SHORTNESS OF BREATH): ICD-10-CM

## 2020-09-27 DIAGNOSIS — I21.4 NSTEMI (NON-ST ELEVATED MYOCARDIAL INFARCTION): Primary | ICD-10-CM

## 2020-09-27 DIAGNOSIS — F12.10 MARIJUANA ABUSE: ICD-10-CM

## 2020-09-27 DIAGNOSIS — F19.10 POLYSUBSTANCE ABUSE: ICD-10-CM

## 2020-09-27 DIAGNOSIS — I10 ESSENTIAL HYPERTENSION: Chronic | ICD-10-CM

## 2020-09-27 DIAGNOSIS — F19.10 INTRAVENOUS DRUG ABUSE: Chronic | ICD-10-CM

## 2020-09-27 DIAGNOSIS — E78.2 MIXED HYPERLIPIDEMIA: Chronic | ICD-10-CM

## 2020-09-27 DIAGNOSIS — I24.9 ACS (ACUTE CORONARY SYNDROME): ICD-10-CM

## 2020-09-27 DIAGNOSIS — R79.89 ELEVATED TROPONIN: ICD-10-CM

## 2020-09-27 DIAGNOSIS — I25.10 CORONARY ARTERY DISEASE, ANGINA PRESENCE UNSPECIFIED, UNSPECIFIED VESSEL OR LESION TYPE, UNSPECIFIED WHETHER NATIVE OR TRANSPLANTED HEART: Chronic | ICD-10-CM

## 2020-09-27 DIAGNOSIS — R07.9 CHEST PAIN, UNSPECIFIED TYPE: ICD-10-CM

## 2020-09-27 DIAGNOSIS — G80.9 CP (CEREBRAL PALSY): ICD-10-CM

## 2020-09-27 DIAGNOSIS — E10.65 TYPE 1 DIABETES MELLITUS WITH HYPERGLYCEMIA: Chronic | ICD-10-CM

## 2020-09-27 DIAGNOSIS — D63.8 ANEMIA OF CHRONIC DISEASE: Chronic | ICD-10-CM

## 2020-09-27 PROBLEM — J18.9 CAVITARY PNEUMONIA: Status: RESOLVED | Noted: 2020-04-24 | Resolved: 2020-09-27

## 2020-09-27 PROBLEM — E83.42 HYPOMAGNESEMIA: Status: RESOLVED | Noted: 2020-05-25 | Resolved: 2020-09-27

## 2020-09-27 PROBLEM — G47.00 INSOMNIA: Chronic | Status: ACTIVE | Noted: 2020-04-21

## 2020-09-27 PROBLEM — J96.01 ACUTE RESPIRATORY FAILURE WITH HYPOXIA AND HYPERCARBIA: Status: RESOLVED | Noted: 2020-04-19 | Resolved: 2020-09-27

## 2020-09-27 PROBLEM — F17.200 TOBACCO USE DISORDER, MODERATE, DEPENDENCE: Chronic | Status: ACTIVE | Noted: 2017-11-23

## 2020-09-27 PROBLEM — J98.4 CAVITARY PNEUMONIA: Status: RESOLVED | Noted: 2020-04-24 | Resolved: 2020-09-27

## 2020-09-27 PROBLEM — I76 SEPTIC EMBOLISM: Status: RESOLVED | Noted: 2020-03-27 | Resolved: 2020-09-27

## 2020-09-27 PROBLEM — J18.9 PARAPNEUMONIC EFFUSION: Status: RESOLVED | Noted: 2020-03-30 | Resolved: 2020-09-27

## 2020-09-27 PROBLEM — T40.1X1A HEROIN OVERDOSE: Status: RESOLVED | Noted: 2020-05-25 | Resolved: 2020-09-27

## 2020-09-27 PROBLEM — R73.9 HYPERGLYCEMIA: Status: RESOLVED | Noted: 2020-04-29 | Resolved: 2020-09-27

## 2020-09-27 PROBLEM — D50.9 IRON DEFICIENCY ANEMIA: Status: RESOLVED | Noted: 2020-04-04 | Resolved: 2020-04-20

## 2020-09-27 PROBLEM — R78.81 BACTEREMIA: Status: RESOLVED | Noted: 2020-08-09 | Resolved: 2020-09-27

## 2020-09-27 PROBLEM — J96.02 ACUTE RESPIRATORY FAILURE WITH HYPOXIA AND HYPERCARBIA: Status: RESOLVED | Noted: 2020-04-19 | Resolved: 2020-09-27

## 2020-09-27 PROBLEM — M79.605 PAIN OF LEFT LOWER EXTREMITY: Status: RESOLVED | Noted: 2020-04-18 | Resolved: 2020-09-27

## 2020-09-27 PROBLEM — J91.8 PARAPNEUMONIC EFFUSION: Status: RESOLVED | Noted: 2020-03-30 | Resolved: 2020-09-27

## 2020-09-27 PROBLEM — E16.2 HYPOGLYCEMIA: Status: RESOLVED | Noted: 2020-05-09 | Resolved: 2020-09-27

## 2020-09-27 PROBLEM — J15.9 BACTERIAL PNEUMONIA: Status: RESOLVED | Noted: 2020-02-29 | Resolved: 2020-09-27

## 2020-09-27 PROBLEM — A41.9 SEPSIS: Status: RESOLVED | Noted: 2020-03-01 | Resolved: 2020-09-27

## 2020-09-27 LAB
ALBUMIN SERPL BCP-MCNC: 3.5 G/DL (ref 3.5–5.2)
ALLENS TEST: ABNORMAL
ALP SERPL-CCNC: 86 U/L (ref 55–135)
ALT SERPL W/O P-5'-P-CCNC: 30 U/L (ref 10–44)
AMPHET+METHAMPHET UR QL: NEGATIVE
ANION GAP SERPL CALC-SCNC: 8 MMOL/L (ref 8–16)
AORTIC VALVE CUSP SEPERATION: 2 CM
ASCENDING AORTA: 3.1 CM
AST SERPL-CCNC: 18 U/L (ref 10–40)
AV INDEX (PROSTH): 0.75
AV MEAN GRADIENT: 3 MMHG
AV PEAK GRADIENT: 5 MMHG
AV VALVE AREA: 2.13 CM2
AV VELOCITY RATIO: 0.73
BACTERIA #/AREA URNS HPF: NORMAL /HPF
BARBITURATES UR QL SCN>200 NG/ML: NEGATIVE
BASOPHILS # BLD AUTO: 0.03 K/UL (ref 0–0.2)
BASOPHILS # BLD AUTO: 0.04 K/UL (ref 0–0.2)
BASOPHILS NFR BLD: 0.5 % (ref 0–1.9)
BASOPHILS NFR BLD: 0.6 % (ref 0–1.9)
BENZODIAZ UR QL SCN>200 NG/ML: NEGATIVE
BILIRUB SERPL-MCNC: 0.4 MG/DL (ref 0.1–1)
BILIRUB UR QL STRIP: NEGATIVE
BILIRUB UR QL STRIP: NEGATIVE
BNP SERPL-MCNC: 195 PG/ML (ref 0–99)
BSA FOR ECHO PROCEDURE: 1.58 M2
BUN SERPL-MCNC: 14 MG/DL (ref 6–20)
BZE UR QL SCN: NEGATIVE
CALCIUM SERPL-MCNC: 9.4 MG/DL (ref 8.7–10.5)
CANNABINOIDS UR QL SCN: NEGATIVE
CHLORIDE SERPL-SCNC: 99 MMOL/L (ref 95–110)
CHOLEST SERPL-MCNC: 129 MG/DL (ref 120–199)
CHOLEST/HDLC SERPL: 3.1 {RATIO} (ref 2–5)
CLARITY UR: CLEAR
CLARITY UR: CLEAR
CO2 SERPL-SCNC: 31 MMOL/L (ref 23–29)
COLOR UR: YELLOW
COLOR UR: YELLOW
CREAT SERPL-MCNC: 1.2 MG/DL (ref 0.5–1.4)
CREAT UR-MCNC: 80.9 MG/DL (ref 23–375)
CTP QC/QA: YES
CV ECHO LV RWT: 0.88 CM
DELSYS: ABNORMAL
DIFFERENTIAL METHOD: ABNORMAL
DIFFERENTIAL METHOD: ABNORMAL
DOP CALC AO PEAK VEL: 1.1 M/S
DOP CALC AO VTI: 21.6 CM
DOP CALC LVOT AREA: 2.8 CM2
DOP CALC LVOT DIAMETER: 1.9 CM
DOP CALC LVOT PEAK VEL: 0.8 M/S
DOP CALC LVOT STROKE VOLUME: 45.91 CM3
DOP CALCLVOT PEAK VEL VTI: 16.2 CM
E WAVE DECELERATION TIME: 219 MSEC
E/A RATIO: 0.75
E/E' RATIO: 8 M/S
ECHO EF ESTIMATED: 56 %
ECHO LV POSTERIOR WALL: 1.4 CM (ref 0.6–1.1)
EOSINOPHIL # BLD AUTO: 0.6 K/UL (ref 0–0.5)
EOSINOPHIL # BLD AUTO: 1 K/UL (ref 0–0.5)
EOSINOPHIL NFR BLD: 10.9 % (ref 0–8)
EOSINOPHIL NFR BLD: 15.2 % (ref 0–8)
ERYTHROCYTE [DISTWIDTH] IN BLOOD BY AUTOMATED COUNT: 14.6 % (ref 11.5–14.5)
ERYTHROCYTE [DISTWIDTH] IN BLOOD BY AUTOMATED COUNT: 14.6 % (ref 11.5–14.5)
EST. GFR  (AFRICAN AMERICAN): >60 ML/MIN/1.73 M^2
EST. GFR  (NON AFRICAN AMERICAN): >60 ML/MIN/1.73 M^2
ETHANOL SERPL-MCNC: <10 MG/DL
FRACTIONAL SHORTENING: 28 % (ref 28–44)
GLUCOSE SERPL-MCNC: 204 MG/DL (ref 70–110)
GLUCOSE UR QL STRIP: ABNORMAL
GLUCOSE UR QL STRIP: ABNORMAL
HCO3 UR-SCNC: 31.4 MMOL/L (ref 24–28)
HCT VFR BLD AUTO: 38.8 % (ref 40–54)
HCT VFR BLD AUTO: 40 % (ref 40–54)
HDLC SERPL-MCNC: 41 MG/DL (ref 40–75)
HDLC SERPL: 31.8 % (ref 20–50)
HGB BLD-MCNC: 12.2 G/DL (ref 14–18)
HGB BLD-MCNC: 12.2 G/DL (ref 14–18)
HGB UR QL STRIP: NEGATIVE
HGB UR QL STRIP: NEGATIVE
IMM GRANULOCYTES # BLD AUTO: 0.02 K/UL (ref 0–0.04)
IMM GRANULOCYTES # BLD AUTO: 0.03 K/UL (ref 0–0.04)
IMM GRANULOCYTES NFR BLD AUTO: 0.3 % (ref 0–0.5)
IMM GRANULOCYTES NFR BLD AUTO: 0.5 % (ref 0–0.5)
INTERVENTRICULAR SEPTUM: 1.4 CM (ref 0.6–1.1)
IVC DIAMETER: 1.24 CM
KETONES UR QL STRIP: NEGATIVE
KETONES UR QL STRIP: NEGATIVE
LA MAJOR: 3.8 CM
LDLC SERPL CALC-MCNC: 71.6 MG/DL (ref 63–159)
LEFT ATRIUM SIZE: 2.2 CM
LEFT INTERNAL DIMENSION IN SYSTOLE: 2.3 CM (ref 2.1–4)
LEFT VENTRICLE MASS INDEX: 95 G/M2
LEFT VENTRICULAR INTERNAL DIMENSION IN DIASTOLE: 3.2 CM (ref 3.5–6)
LEFT VENTRICULAR MASS: 153.05 G
LEUKOCYTE ESTERASE UR QL STRIP: NEGATIVE
LEUKOCYTE ESTERASE UR QL STRIP: NEGATIVE
LIPASE SERPL-CCNC: 18 U/L (ref 4–60)
LV LATERAL E/E' RATIO: 6.5 M/S
LV SEPTAL E/E' RATIO: 10.4 M/S
LYMPHOCYTES # BLD AUTO: 1.1 K/UL (ref 1–4.8)
LYMPHOCYTES # BLD AUTO: 1.5 K/UL (ref 1–4.8)
LYMPHOCYTES NFR BLD: 19.5 % (ref 18–48)
LYMPHOCYTES NFR BLD: 22.7 % (ref 18–48)
MAGNESIUM SERPL-MCNC: 2.1 MG/DL (ref 1.6–2.6)
MCH RBC QN AUTO: 25.6 PG (ref 27–31)
MCH RBC QN AUTO: 26 PG (ref 27–31)
MCHC RBC AUTO-ENTMCNC: 30.5 G/DL (ref 32–36)
MCHC RBC AUTO-ENTMCNC: 31.4 G/DL (ref 32–36)
MCV RBC AUTO: 83 FL (ref 82–98)
MCV RBC AUTO: 84 FL (ref 82–98)
METHADONE UR QL SCN>300 NG/ML: NORMAL
MICROSCOPIC COMMENT: NORMAL
MONOCYTES # BLD AUTO: 0.6 K/UL (ref 0.3–1)
MONOCYTES # BLD AUTO: 0.7 K/UL (ref 0.3–1)
MONOCYTES NFR BLD: 10 % (ref 4–15)
MONOCYTES NFR BLD: 10.9 % (ref 4–15)
MV PEAK A VEL: 0.69 M/S
MV PEAK E VEL: 0.52 M/S
NEUTROPHILS # BLD AUTO: 3.2 K/UL (ref 1.8–7.7)
NEUTROPHILS # BLD AUTO: 3.5 K/UL (ref 1.8–7.7)
NEUTROPHILS NFR BLD: 51.2 % (ref 38–73)
NEUTROPHILS NFR BLD: 57.7 % (ref 38–73)
NITRITE UR QL STRIP: NEGATIVE
NITRITE UR QL STRIP: NEGATIVE
NONHDLC SERPL-MCNC: 88 MG/DL
NRBC BLD-RTO: 0 /100 WBC
NRBC BLD-RTO: 0 /100 WBC
OPIATES UR QL SCN: NORMAL
PCO2 BLDA: 59.9 MMHG (ref 35–45)
PCP UR QL SCN>25 NG/ML: NEGATIVE
PH SMN: 7.33 [PH] (ref 7.35–7.45)
PH UR STRIP: 7 [PH] (ref 5–8)
PH UR STRIP: 7 [PH] (ref 5–8)
PISA TR MAX VEL: 1.55 M/S
PLATELET # BLD AUTO: 299 K/UL (ref 150–350)
PLATELET # BLD AUTO: 324 K/UL (ref 150–350)
PMV BLD AUTO: 9.5 FL (ref 9.2–12.9)
PMV BLD AUTO: 9.8 FL (ref 9.2–12.9)
PO2 BLDA: 15 MMHG (ref 40–60)
POC BE: 4 MMOL/L
POC SATURATED O2: 17 % (ref 95–100)
POC TCO2: 33 MMOL/L (ref 24–29)
POCT GLUCOSE: 228 MG/DL (ref 70–110)
POCT GLUCOSE: 268 MG/DL (ref 70–110)
POCT GLUCOSE: 367 MG/DL (ref 70–110)
POCT GLUCOSE: 433 MG/DL (ref 70–110)
POTASSIUM SERPL-SCNC: 4.4 MMOL/L (ref 3.5–5.1)
PROT SERPL-MCNC: 7.9 G/DL (ref 6–8.4)
PROT UR QL STRIP: NEGATIVE
PROT UR QL STRIP: NEGATIVE
PULM VEIN A" WAVE DURATION": 111 MSEC
PULM VEIN S/D RATIO: 1.22
PV PEAK D VEL: 0.36 M/S
PV PEAK S VEL: 0.44 M/S
PV PEAK VELOCITY: 0.93 CM/S
RA MAJOR: 3.5 CM
RA PRESSURE: 3 MMHG
RA WIDTH: 3.5 CM
RBC # BLD AUTO: 4.69 M/UL (ref 4.6–6.2)
RBC # BLD AUTO: 4.77 M/UL (ref 4.6–6.2)
RBC #/AREA URNS HPF: 4 /HPF (ref 0–4)
RIGHT VENTRICULAR END-DIASTOLIC DIMENSION: 3 CM
RV TISSUE DOPPLER FREE WALL SYSTOLIC VELOCITY 1 (APICAL 4 CHAMBER VIEW): 15 CM/S
SAMPLE: ABNORMAL
SARS-COV-2 RDRP RESP QL NAA+PROBE: NEGATIVE
SINUS: 2.9 CM
SITE: ABNORMAL
SODIUM SERPL-SCNC: 138 MMOL/L (ref 136–145)
SP GR UR STRIP: 1.01 (ref 1–1.03)
SP GR UR STRIP: 1.01 (ref 1–1.03)
SQUAMOUS #/AREA URNS HPF: 1 /HPF
STJ: 2.4 CM
TDI LATERAL: 0.08 M/S
TDI SEPTAL: 0.05 M/S
TDI: 0.07 M/S
TOXICOLOGY INFORMATION: NORMAL
TR MAX PG: 10 MMHG
TRICUSPID ANNULAR PLANE SYSTOLIC EXCURSION: 1.5 CM
TRIGL SERPL-MCNC: 82 MG/DL (ref 30–150)
TROPONIN I SERPL DL<=0.01 NG/ML-MCNC: 0.14 NG/ML (ref 0–0.03)
TROPONIN I SERPL DL<=0.01 NG/ML-MCNC: 0.16 NG/ML (ref 0–0.03)
TROPONIN I SERPL DL<=0.01 NG/ML-MCNC: 0.19 NG/ML (ref 0–0.03)
TV REST PULMONARY ARTERY PRESSURE: 13 MMHG
URN SPEC COLLECT METH UR: ABNORMAL
URN SPEC COLLECT METH UR: ABNORMAL
UROBILINOGEN UR STRIP-ACNC: NEGATIVE EU/DL
UROBILINOGEN UR STRIP-ACNC: NEGATIVE EU/DL
WBC # BLD AUTO: 5.48 K/UL (ref 3.9–12.7)
WBC # BLD AUTO: 6.73 K/UL (ref 3.9–12.7)
WBC #/AREA URNS HPF: 1 /HPF (ref 0–5)
YEAST URNS QL MICRO: NORMAL

## 2020-09-27 PROCEDURE — 83690 ASSAY OF LIPASE: CPT

## 2020-09-27 PROCEDURE — 25000003 PHARM REV CODE 250: Performed by: EMERGENCY MEDICINE

## 2020-09-27 PROCEDURE — 80053 COMPREHEN METABOLIC PANEL: CPT

## 2020-09-27 PROCEDURE — 63600175 PHARM REV CODE 636 W HCPCS: Performed by: HOSPITALIST

## 2020-09-27 PROCEDURE — 83036 HEMOGLOBIN GLYCOSYLATED A1C: CPT

## 2020-09-27 PROCEDURE — 63600175 PHARM REV CODE 636 W HCPCS: Performed by: EMERGENCY MEDICINE

## 2020-09-27 PROCEDURE — 25000003 PHARM REV CODE 250: Performed by: NURSE PRACTITIONER

## 2020-09-27 PROCEDURE — 96372 THER/PROPH/DIAG INJ SC/IM: CPT | Mod: 59

## 2020-09-27 PROCEDURE — 81000 URINALYSIS NONAUTO W/SCOPE: CPT | Mod: 59

## 2020-09-27 PROCEDURE — 96372 THER/PROPH/DIAG INJ SC/IM: CPT

## 2020-09-27 PROCEDURE — 94761 N-INVAS EAR/PLS OXIMETRY MLT: CPT

## 2020-09-27 PROCEDURE — 93005 ELECTROCARDIOGRAM TRACING: CPT

## 2020-09-27 PROCEDURE — 93010 ELECTROCARDIOGRAM REPORT: CPT | Mod: ,,, | Performed by: INTERNAL MEDICINE

## 2020-09-27 PROCEDURE — U0002 COVID-19 LAB TEST NON-CDC: HCPCS | Performed by: EMERGENCY MEDICINE

## 2020-09-27 PROCEDURE — 99900035 HC TECH TIME PER 15 MIN (STAT)

## 2020-09-27 PROCEDURE — C9399 UNCLASSIFIED DRUGS OR BIOLOG: HCPCS | Performed by: HOSPITALIST

## 2020-09-27 PROCEDURE — 25000003 PHARM REV CODE 250: Performed by: HOSPITALIST

## 2020-09-27 PROCEDURE — 80307 DRUG TEST PRSMV CHEM ANLYZR: CPT

## 2020-09-27 PROCEDURE — 99218 PR INITIAL OBSERVATION CARE,LEVL I: ICD-10-PCS | Mod: 25,,, | Performed by: INTERNAL MEDICINE

## 2020-09-27 PROCEDURE — 84484 ASSAY OF TROPONIN QUANT: CPT | Mod: 91

## 2020-09-27 PROCEDURE — 25000242 PHARM REV CODE 250 ALT 637 W/ HCPCS: Performed by: EMERGENCY MEDICINE

## 2020-09-27 PROCEDURE — 99218 PR INITIAL OBSERVATION CARE,LEVL I: CPT | Mod: 25,,, | Performed by: INTERNAL MEDICINE

## 2020-09-27 PROCEDURE — 93010 EKG 12-LEAD: ICD-10-PCS | Mod: ,,, | Performed by: INTERNAL MEDICINE

## 2020-09-27 PROCEDURE — 82803 BLOOD GASES ANY COMBINATION: CPT

## 2020-09-27 PROCEDURE — G0378 HOSPITAL OBSERVATION PER HR: HCPCS

## 2020-09-27 PROCEDURE — 80320 DRUG SCREEN QUANTALCOHOLS: CPT

## 2020-09-27 PROCEDURE — 99285 EMERGENCY DEPT VISIT HI MDM: CPT | Mod: 25

## 2020-09-27 PROCEDURE — 36415 COLL VENOUS BLD VENIPUNCTURE: CPT

## 2020-09-27 PROCEDURE — 85025 COMPLETE CBC W/AUTO DIFF WBC: CPT

## 2020-09-27 PROCEDURE — 83735 ASSAY OF MAGNESIUM: CPT

## 2020-09-27 PROCEDURE — 94640 AIRWAY INHALATION TREATMENT: CPT

## 2020-09-27 PROCEDURE — 82962 GLUCOSE BLOOD TEST: CPT

## 2020-09-27 PROCEDURE — 80061 LIPID PANEL: CPT

## 2020-09-27 PROCEDURE — 83880 ASSAY OF NATRIURETIC PEPTIDE: CPT

## 2020-09-27 RX ORDER — ATORVASTATIN CALCIUM 10 MG/1
20 TABLET, FILM COATED ORAL DAILY
Status: DISCONTINUED | OUTPATIENT
Start: 2020-09-28 | End: 2020-09-28 | Stop reason: HOSPADM

## 2020-09-27 RX ORDER — DIVALPROEX SODIUM 125 MG/1
125 TABLET, DELAYED RELEASE ORAL EVERY 8 HOURS
Status: DISCONTINUED | OUTPATIENT
Start: 2020-09-27 | End: 2020-09-27

## 2020-09-27 RX ORDER — AMOXICILLIN 250 MG
1 CAPSULE ORAL DAILY PRN
Status: DISCONTINUED | OUTPATIENT
Start: 2020-09-27 | End: 2020-09-28 | Stop reason: HOSPADM

## 2020-09-27 RX ORDER — NAPROXEN SODIUM 220 MG/1
324 TABLET, FILM COATED ORAL
Status: COMPLETED | OUTPATIENT
Start: 2020-09-27 | End: 2020-09-27

## 2020-09-27 RX ORDER — IPRATROPIUM BROMIDE AND ALBUTEROL SULFATE 2.5; .5 MG/3ML; MG/3ML
3 SOLUTION RESPIRATORY (INHALATION)
Status: COMPLETED | OUTPATIENT
Start: 2020-09-27 | End: 2020-09-27

## 2020-09-27 RX ORDER — DULOXETIN HYDROCHLORIDE 30 MG/1
60 CAPSULE, DELAYED RELEASE ORAL DAILY
Status: DISCONTINUED | OUTPATIENT
Start: 2020-09-28 | End: 2020-09-28 | Stop reason: HOSPADM

## 2020-09-27 RX ORDER — BISACODYL 10 MG
10 SUPPOSITORY, RECTAL RECTAL DAILY PRN
Status: DISCONTINUED | OUTPATIENT
Start: 2020-09-27 | End: 2020-09-28 | Stop reason: HOSPADM

## 2020-09-27 RX ORDER — SODIUM CHLORIDE 0.9 % (FLUSH) 0.9 %
10 SYRINGE (ML) INJECTION
Status: DISCONTINUED | OUTPATIENT
Start: 2020-09-27 | End: 2020-09-28 | Stop reason: HOSPADM

## 2020-09-27 RX ORDER — INSULIN ASPART 100 [IU]/ML
1-10 INJECTION, SOLUTION INTRAVENOUS; SUBCUTANEOUS
Status: DISCONTINUED | OUTPATIENT
Start: 2020-09-27 | End: 2020-09-27

## 2020-09-27 RX ORDER — GLUCAGON 1 MG
1 KIT INJECTION
Status: DISCONTINUED | OUTPATIENT
Start: 2020-09-27 | End: 2020-09-28 | Stop reason: HOSPADM

## 2020-09-27 RX ORDER — FOLIC ACID 1 MG/1
1000 TABLET ORAL DAILY
Status: DISCONTINUED | OUTPATIENT
Start: 2020-09-28 | End: 2020-09-28 | Stop reason: HOSPADM

## 2020-09-27 RX ORDER — INSULIN ASPART 100 [IU]/ML
1-10 INJECTION, SOLUTION INTRAVENOUS; SUBCUTANEOUS
Status: DISCONTINUED | OUTPATIENT
Start: 2020-09-27 | End: 2020-09-28 | Stop reason: HOSPADM

## 2020-09-27 RX ORDER — IBUPROFEN 200 MG
24 TABLET ORAL
Status: DISCONTINUED | OUTPATIENT
Start: 2020-09-27 | End: 2020-09-28 | Stop reason: HOSPADM

## 2020-09-27 RX ORDER — QUETIAPINE FUMARATE 25 MG/1
50 TABLET, FILM COATED ORAL NIGHTLY
Status: DISCONTINUED | OUTPATIENT
Start: 2020-09-27 | End: 2020-09-28 | Stop reason: HOSPADM

## 2020-09-27 RX ORDER — ENOXAPARIN SODIUM 100 MG/ML
40 INJECTION SUBCUTANEOUS EVERY 24 HOURS
Status: DISCONTINUED | OUTPATIENT
Start: 2020-09-27 | End: 2020-09-28 | Stop reason: HOSPADM

## 2020-09-27 RX ORDER — GABAPENTIN 300 MG/1
600 CAPSULE ORAL 3 TIMES DAILY
Status: DISCONTINUED | OUTPATIENT
Start: 2020-09-27 | End: 2020-09-28 | Stop reason: HOSPADM

## 2020-09-27 RX ORDER — IBUPROFEN 200 MG
16 TABLET ORAL
Status: DISCONTINUED | OUTPATIENT
Start: 2020-09-27 | End: 2020-09-28 | Stop reason: HOSPADM

## 2020-09-27 RX ORDER — NAPROXEN SODIUM 220 MG/1
81 TABLET, FILM COATED ORAL DAILY
Status: DISCONTINUED | OUTPATIENT
Start: 2020-09-28 | End: 2020-09-28 | Stop reason: HOSPADM

## 2020-09-27 RX ORDER — HYDROXYZINE HYDROCHLORIDE 25 MG/1
25 TABLET, FILM COATED ORAL 3 TIMES DAILY PRN
Status: DISCONTINUED | OUTPATIENT
Start: 2020-09-27 | End: 2020-09-28 | Stop reason: HOSPADM

## 2020-09-27 RX ORDER — ENOXAPARIN SODIUM 100 MG/ML
1 INJECTION SUBCUTANEOUS
Status: COMPLETED | OUTPATIENT
Start: 2020-09-27 | End: 2020-09-27

## 2020-09-27 RX ORDER — ACETAMINOPHEN 325 MG/1
650 TABLET ORAL EVERY 6 HOURS PRN
Status: DISCONTINUED | OUTPATIENT
Start: 2020-09-27 | End: 2020-09-28 | Stop reason: HOSPADM

## 2020-09-27 RX ORDER — TALC
6 POWDER (GRAM) TOPICAL NIGHTLY PRN
Status: DISCONTINUED | OUTPATIENT
Start: 2020-09-27 | End: 2020-09-28 | Stop reason: HOSPADM

## 2020-09-27 RX ORDER — INSULIN GLARGINE 100 [IU]/ML
32 INJECTION, SOLUTION SUBCUTANEOUS NIGHTLY
Status: ON HOLD | COMMUNITY
Start: 2019-05-24 | End: 2021-05-03 | Stop reason: SDUPTHER

## 2020-09-27 RX ORDER — SODIUM CHLORIDE 9 MG/ML
INJECTION, SOLUTION INTRAVENOUS ONCE
Status: COMPLETED | OUTPATIENT
Start: 2020-09-28 | End: 2020-09-28

## 2020-09-27 RX ORDER — METHADONE HYDROCHLORIDE 10 MG/1
20 TABLET ORAL DAILY
Status: DISCONTINUED | OUTPATIENT
Start: 2020-09-28 | End: 2020-09-28 | Stop reason: HOSPADM

## 2020-09-27 RX ORDER — POLYETHYLENE GLYCOL 3350 17 G/17G
17 POWDER, FOR SOLUTION ORAL DAILY PRN
Status: DISCONTINUED | OUTPATIENT
Start: 2020-09-27 | End: 2020-09-28 | Stop reason: HOSPADM

## 2020-09-27 RX ADMIN — IPRATROPIUM BROMIDE AND ALBUTEROL SULFATE 3 ML: .5; 3 SOLUTION RESPIRATORY (INHALATION) at 09:09

## 2020-09-27 RX ADMIN — HYDROXYZINE HYDROCHLORIDE 25 MG: 25 TABLET, FILM COATED ORAL at 11:09

## 2020-09-27 RX ADMIN — GABAPENTIN 600 MG: 300 CAPSULE ORAL at 10:09

## 2020-09-27 RX ADMIN — ENOXAPARIN SODIUM 50 MG: 60 INJECTION SUBCUTANEOUS at 10:09

## 2020-09-27 RX ADMIN — ASPIRIN 324 MG: 81 TABLET, CHEWABLE ORAL at 10:09

## 2020-09-27 RX ADMIN — INSULIN DETEMIR 25 UNITS: 100 INJECTION, SOLUTION SUBCUTANEOUS at 08:09

## 2020-09-27 RX ADMIN — TRAZODONE HYDROCHLORIDE 25 MG: 50 TABLET ORAL at 10:09

## 2020-09-27 RX ADMIN — QUETIAPINE FUMARATE 50 MG: 25 TABLET ORAL at 11:09

## 2020-09-27 RX ADMIN — INSULIN ASPART 10 UNITS: 100 INJECTION, SOLUTION INTRAVENOUS; SUBCUTANEOUS at 04:09

## 2020-09-27 NOTE — Clinical Note
Catheter is repositioned to the ostium   right coronary artery. Angiography performed of the right coronary arteries in multiple views. Catheter removed.

## 2020-09-27 NOTE — ED PROVIDER NOTES
"Encounter Date: 9/27/2020    SCRIBE #1 NOTE: I, Marybeth Marjan, am scribing for, and in the presence of,  Jaycob Rueda MD. I have scribed the following portions of the note - Other sections scribed: HPI, ROS, PE.       History     Chief Complaint   Patient presents with    Shortness of Breath     sob with new Hx: CHF.  complaints of chest pain also.  all started yesterday.  chest pains are center chest, non radiating.     This is a 56 y/o male with PMHx of HTN, HCV, DKA, IVDU, and T1DM who presents to the ED c/o shortness of breath and central chest pain that began this morning. He notes taking several breathing treatments with alleviation since yesterday. Patient states, "When I take a breath, it feels funny." Patient reports a history of heart problems. PSHx includes cardiac surgery. He is a current every day smoker, 1 cigarette per day. No known drug allergies. No other associated symptoms.    The history is provided by the patient. No  was used.     Review of patient's allergies indicates:  No Known Allergies  Past Medical History:   Diagnosis Date    Acute on chronic pancreatitis 4/3/2016    CHF (congestive heart failure)     DKA (diabetic ketoacidoses) 02/2020    DM (diabetes mellitus), type 1     HCV (hepatitis C virus)     high VL     HTN (hypertension)     Hypomagnesemia 5/25/2020    IVDU (intravenous drug user)     Heroin    Pancreatitis      Past Surgical History:   Procedure Laterality Date    CARDIAC SURGERY  1999    stent placed. ([facility])    ESOPHAGOGASTRODUODENOSCOPY N/A 3/5/2020    Gastritis.  No H pylori.  Completed PPI for 1 month.  Procedure: EGD (ESOPHAGOGASTRODUODENOSCOPY);  Surgeon: Brinda Rene MD;  Location[facility];  Service: Endoscopy;  Laterality: N/A;  W421 A; x5445    SHOULDER SURGERY  2013    right shoulder, spur removal.     Family History   Problem Relation Age of Onset    Asthma Mother      Social History     Tobacco Use    " Smoking status: Current Every Day Smoker     Packs/day: 1.00     Years: 20.00     Pack years: 20.00     Types: Cigarettes     Start date: 4/3/1981     Last attempt to quit: 2020     Years since quittin.3    Smokeless tobacco: Never Used    Tobacco comment: states approx 1 per day.   Substance Use Topics    Alcohol use: Not Currently     Alcohol/week: 0.0 standard drinks    Drug use: Not Currently     Types: Heroin     Comment: states hasn't done any in a while     Review of Systems   Constitutional: Negative for chills and fever.   HENT: Negative for sore throat.    Respiratory: Positive for shortness of breath.    Cardiovascular: Positive for chest pain.   Gastrointestinal: Negative for diarrhea, nausea and vomiting.   Genitourinary: Negative for dysuria.   Neurological: Negative for headaches.       Physical Exam     Initial Vitals [20 0757]   BP Pulse Resp Temp SpO2   120/72 108 (!) 24 98.2 °F (36.8 °C) 96 %      MAP       --         Physical Exam    Nursing note and vitals reviewed.  Constitutional: He appears well-developed. He is not diaphoretic. He appears distressed (mildly).   HENT:   Head: Normocephalic and atraumatic.   Mouth/Throat: Oropharynx is clear and moist.   Eyes: Conjunctivae and EOM are normal. Pupils are equal, round, and reactive to light. Right eye exhibits no discharge. Left eye exhibits no discharge.   Neck: Normal range of motion. Neck supple. No JVD present.   Cardiovascular: Regular rhythm, normal heart sounds and intact distal pulses.   No murmur heard.  Pulmonary/Chest: No stridor. No respiratory distress. He has wheezes (mild bilateral upper lobes). He has no rales.   Abdominal: Soft. Bowel sounds are normal. He exhibits no distension. There is no abdominal tenderness.   Musculoskeletal: Normal range of motion. No tenderness or edema.   Neurological: He is alert and oriented to person, place, and time. He has normal strength. No cranial nerve deficit or sensory  deficit. GCS score is 15. GCS eye subscore is 4. GCS verbal subscore is 5. GCS motor subscore is 6.   Skin: Skin is warm and dry. No rash noted.         ED Course   Procedures  Labs Reviewed   CBC W/ AUTO DIFFERENTIAL - Abnormal; Notable for the following components:       Result Value    Hemoglobin 12.2 (*)     Mean Corpuscular Hemoglobin 25.6 (*)     Mean Corpuscular Hemoglobin Conc 30.5 (*)     RDW 14.6 (*)     Eos # 1.0 (*)     Eosinophil% 15.2 (*)     All other components within normal limits   COMPREHENSIVE METABOLIC PANEL - Abnormal; Notable for the following components:    CO2 31 (*)     Glucose 204 (*)     All other components within normal limits   TROPONIN I - Abnormal; Notable for the following components:    Troponin I 0.189 (*)     All other components within normal limits   B-TYPE NATRIURETIC PEPTIDE - Abnormal; Notable for the following components:     (*)     All other components within normal limits   POCT GLUCOSE - Abnormal; Notable for the following components:    POCT Glucose 228 (*)     All other components within normal limits   ISTAT PROCEDURE - Abnormal; Notable for the following components:    POC PH 7.328 (*)     POC PCO2 59.9 (*)     POC PO2 15 (*)     POC HCO3 31.4 (*)     POC SATURATED O2 17 (*)     POC TCO2 33 (*)     All other components within normal limits   ALCOHOL,MEDICAL (ETHANOL)   LIPASE   LIPASE   DRUG SCREEN PANEL, URINE EMERGENCY   LIPID PANEL   MAGNESIUM   LIPID PANEL   MAGNESIUM   SARS-COV-2 RDRP GENE        ECG Results          EKG 12-lead (In process)  Result time 09/28/20 06:36:44    In process by Interface, Lab In [facility] (09/28/20 06:36:44)                 Narrative:    Test Reason : R06.02,    Vent. Rate : 101 BPM     Atrial Rate : 101 BPM     P-R Int : 144 ms          QRS Dur : 086 ms      QT Int : 364 ms       P-R-T Axes : 087 089 089 degrees     QTc Int : 471 ms    Sinus tachycardia  Otherwise normal ECG  When compared with ECG of 23-SEP-2020 18:52,  No  significant change was found    Referred By: [email or web address]   SELF           Confirmed By:                             Imaging Results          X-Ray Chest 1 View (Final result)  Result time 09/28/20 09:23:47    Final result by RADIOLOGIST, NNEKA (09/28/20 09:23:47)                 Impression:      1. Right costophrenic angle blunting , flattened right hemidiaphragm and stranding most suggestive of scarring. 2. There are crowded bronchovascular markings in the right upper lung field with minimal linear stranding in both mid lung fields which may represent scarring or minimal infiltrate or atelectasis.      Electronically signed by: Virtual Radiologist  Date:    09/28/2020  Time:    09:23             Narrative:    EXAMINATION:  : XR Chest, 1 View    CLINICAL HISTORY:  Shortness of breath    TECHNIQUE  Imaging protocol: XR of the chest Views: 1 view.    COMPARISON:  No relevant prior studies available.    FINDINGS:  Lungs: Left lung field is clear. There are crowded bronchovascular markings in the right upper lung field with minimal linear stranding in both mid lung fields which may represent scarring or minimal infiltrate or atelectasis. Pleural space: Right costophrenic angle blunting , flattened right hemidiaphragm and stranding most suggestive of scarring. No pneumothorax. No left pleural effusion. Heart/Mediastinum: No cardiomegaly. Bones/joints: Osteopenia.                                 Medical Decision Making:   History:   Old Medical Records: I decided to obtain old medical records.  Initial Assessment:   55 year old male presents to the ED complaining of shortness of breath and central chest pain ongoing since this morning.. The patient was seen and examined. The history and physical exam was obtained. The nursing notes and vital signs were reviewed. Secondary to symptoms and exam findings, I ordered lab tests and a chest X-Ray.   Clinical Tests:   Lab Tests: Ordered and Reviewed  Radiological Study: Ordered  and Reviewed  ED Management:  10:10 AM: I reassessed the patient.  10:34 AM: Per nurse, patient asked if he could have fluids.    MDM:    55 y.o.male with PMHx as noted above, presents with chest pain. Physical exam remarkable for well appearing male, in NAD, conversing with ease, abdomen soft, nt/nd, mild wheezing noted in bilateral upper lobes, RRR, no peripheral edema noted.  ED workup remarkable for EKG - sinus tachycardia, rate 101 bpm similar to prior EKG, no STEMI, trop - 0.189, BNP - 195, CBC/CMP - baseline, pH - 7.32, pCO2 - 60, CXR - unremarkable. Pt presentation consistent with chest pain, with elevated troponin and patient leaving AMA after prior evaluation showing NSTEMI. Discussed with Dr. Rangel who recommended lovenox, echo, ASA/plavix, and continued monitoring of enzymes.  Pt reports he is agreeable to stay overnight for continued monitoring, reassessed with stable vitals and without further complaint at this time.  At this time given patient's history, physical exam, and ED workup do not suspect arrhythmia, acute MI, PE, PTX, aortic dissection, endocarditis, pericarditis, PNA, shingles, or any further malignant cause.  Discussed diagnosis and further treatment with patient at bedside. All questions answered, patient transferred to floor improved and stable.                Scribe Attestation:   Scribe #1: I performed the above scribed service and the documentation accurately describes the services I performed. I attest to the accuracy of the note.                      Clinical Impression:     ICD-10-CM ICD-9-CM   1. Elevated troponin  R79.89 790.6   2. SOB (shortness of breath)  R06.02 786.05   3. ACS (acute coronary syndrome)  I24.9 411.1   4. CP (cerebral palsy)  G80.9 343.9   5. Chest pain, unspecified type  R07.9 786.50                      Disposition:   Disposition: Placed in Observation     ED Disposition Condition    Observation            I, Jaycob Rueda M.D., personally performed the  services described in this documentation. All medical record entries made by the scribe were at my direction and in my presence. I have reviewed the chart and agree that the record reflects my personal performance and is accurate and complete.                 Jaycob Rueda MD  09/28/20 5700

## 2020-09-27 NOTE — ASSESSMENT & PLAN NOTE
Trending downward from a few days ago.  Cardiovascular risk factors of male, diabetes, hyperlipidemia and tobacco use.  Discussed coronary angiography with patient.  He is amenable.    Risks, benefits and alternatives of the catheterization procedure were discussed with the patient.The risks of coronary angiography include but are not limited to: bleeding, infection, death, heart attack, arrhythmia, kidney injury or failure, potential need for dialysis, allergic reactions, stroke, need for emergency surgery, hematoma, pseudoaneurysm etc.  Should stenting be indicated, the patient has agreed to dual anti-platelet therapy for 1-consecutive year with a drug-eluting stent and a minimum of 1-month with the use of a bare metal stent. Additionally, pt is aware that non-compliance is likely to result in stent clotting with heart attack, heart failure, and/or death  The risks of moderate sedation include hypotension, respiratory depression, arrhythmias, bronchospasm, and death. Informed consent was obtained and the  patient is agreeable to proceed with the procedure. Consent was placed on the chart.

## 2020-09-27 NOTE — H&P
[facility] Medicine  History & Physical    Patient Name: Cali Mabry  MRN: [id number]  Admission Date: 9/27/2020  Attending Physician: Rosario Perez MD   Primary Care Provider: Cullen Zimmer MD         Patient information was obtained from patient, past medical records and ER records.     Subjective:     Principal Problem:Elevated troponin    Chief Complaint:   Chief Complaint   Patient presents with    Shortness of Breath     sob with new Hx: CHF.  complaints of chest pain also.  all started yesterday.  chest pains are center chest, non radiating.        HPI: 55 y.o. male with DM type 1, hypertension, anemia chronic disease, hyperlipidemia, IV drug abuse currently on methadone, chronic hep C, tobacco use, insomnia, and coronary artery disease presents with complaint of dyspnea.  Has been intermittent for the past few days, associated with chest pain.  Pain is located mid chest, and also intermittent, does not radiate, no known exacerbating or alleviating factors, no attempted self treatment.  Denies fever, chills, cough palpitations, orthopnea, PND, dizziness, syncope, nausea, vomiting, diarrhea, abdominal pain, bloody or black stools, or dysuria.  In the ED, found to have an elevated troponin of 0.164.  This is down trending from a recent presentation from which she left AMA.  EKG without evidence of acute ischemia.  Routine labs, chest x-ray unremarkable for acute abnormality.  Cardiology consulted and planned for left heart catheterization in the morning.  Placed in observation.    Past Medical History:   Diagnosis Date    Acute on chronic pancreatitis 4/3/2016    CHF (congestive heart failure)     DKA (diabetic ketoacidoses) 02/2020    DM (diabetes mellitus), type 1     HCV (hepatitis C virus)     high VL     HTN (hypertension)     Hypomagnesemia 5/25/2020    IVDU (intravenous drug user)     Heroin    Pancreatitis        Past Surgical History:   Procedure Laterality  Date    CARDIAC SURGERY  1999    stent placed. ([facility])    ESOPHAGOGASTRODUODENOSCOPY N/A 3/5/2020    Gastritis.  No H pylori.  Completed PPI for 1 month.  Procedure: EGD (ESOPHAGOGASTRODUODENOSCOPY);  Surgeon: Brinda Rene MD;  Location: [facility];  Service: Endoscopy;  Laterality: N/A;  W421 A; [id number]    SHOULDER SURGERY  2013    right shoulder, spur removal.       Review of patient's allergies indicates:  No Known Allergies    No current facility-administered medications on file prior to encounter.      Current Outpatient Medications on File Prior to Encounter   Medication Sig    acetaminophen (TYLENOL) 650 MG TbSR Take 650 mg by mouth every 8 (eight) hours as needed.    albuterol-ipratropium (DUO-NEB) 2.5 mg-0.5 mg/3 mL nebulizer solution Take 3 mLs by nebulization every 4 (four) hours as needed for Wheezing or Shortness of Breath. Rescue    ASCORBIC ACID, VITAMIN C, ORAL Take 1 tablet by mouth once daily.    aspirin 81 MG Chew Take 1 tablet (81 mg total) by mouth once daily.    atorvastatin (LIPITOR) 20 MG tablet Take 20 mg by mouth once daily.    bisacodyL (DULCOLAX) 10 mg Supp Place 10 mg rectally daily as needed.    divalproex (DEPAKOTE) 125 MG EC tablet Take 125 mg by mouth every 8 (eight) hours.    docusate sodium (COLACE) 100 MG capsule Take 1 capsule (100 mg total) by mouth 2 (two) times daily as needed for Constipation.    DULoxetine (CYMBALTA) 60 MG capsule Take 1 capsule (60 mg total) by mouth once daily.    folic acid (FOLVITE) 800 MCG Tab Take 800 mcg by mouth once daily.    folic acid-vit B6-vit B12 2.5-25-2 mg (FOLBIC OR EQUIV) 2.5-25-2 mg Tab Take 1 tablet by mouth once daily.    gabapentin (NEURONTIN) 300 MG capsule Take 2 capsules (600 mg total) by mouth 3 (three) times daily.    HALOPERIDOL LACTATE INJ Inject 1 mg as directed every 4 (four) hours as needed.    hydroxyzine HCL (ATARAX) 25 MG tablet Take 1 tablet (25 mg total) by mouth 3 (three) times daily as  needed for Anxiety.    insulin aspart U-100 (NOVOLOG) 100 unit/mL injection Inject 1-10 Units into the skin 3 (three) times daily with meals.    insulin lispro 100 unit/mL injection Inject 15 Units into the skin 3 (three) times daily before meals.    L. acidophilus/Strept/La p-whitney (RISAQUAD ORAL) Take 1 capsule by mouth once daily.    melatonin (MELATIN) 3 mg tablet Take 2 tablets (6 mg total) by mouth nightly as needed for Insomnia.    multivitamin (THERAGRAN) per tablet Take 1 tablet by mouth once daily.    naloxone (NARCAN) 4 mg/actuation Spry 4mg by nasal route as needed for opioid overdose; may repeat every 2-3 minutes in alternating nostrils until medical help arrives. Call 911    nitroGLYCERIN (NITROSTAT) 0.4 MG SL tablet Place 1 tablet (0.4 mg total) under the tongue every 5 (five) minutes as needed for Chest pain.    ondansetron (ZOFRAN) 2 mg/mL injection Inject 4 mg into the vein every 6 (six) hours as needed.    QUEtiapine (SEROQUEL) 50 MG tablet Take 50 mg by mouth every evening.    traZODone (DESYREL) 50 MG tablet Take 25 mg by mouth every evening.    albuterol (PROVENTIL/VENTOLIN HFA) 90 mcg/actuation inhaler Inhale 1-2 puffs into the lungs every 6 (six) hours as needed for Wheezing or Shortness of Breath. Rescue    aspirin (ECOTRIN) 325 MG EC tablet Take 1 tablet (325 mg total) by mouth once daily.    b complex vitamins tablet Take 1 tablet by mouth once daily.    blood sugar diagnostic Strp Use To check blood glucose three times daily,    blood-glucose meter Misc To check Blood glucose three times daily,    calcium carbonate (TUMS) 200 mg calcium (500 mg) chewable tablet Take 1 tablet (500 mg total) by mouth daily as needed for Heartburn.    diphenhydrAMINE (BENADRYL) 25 mg capsule Take 25 mg by mouth every 6 (six) hours as needed for Itching.    glucagon, human recombinant, (GLUCAGEN HYPOKIT) 1 mg SolR Inject 1 mg into the muscle as needed.    glucose 4 GM chewable tablet Take 16  g by mouth as needed for Low blood sugar.    insulin  unit/mL injection Inject 45 Units into the skin every evening.    lancets Misc To check BG 3 times daily, to use with insurance preferred meter    lidocaine (LIDODERM) 5 % Place 1 patch onto the skin once daily. Remove & Discard patch within 12 hours or as directed by MD.  Apply to shoulder for pain    lidocaine (LIDODERM) 5 % Place 1 patch onto the skin daily as needed. Remove & Discard patch within 12 hours or as directed by MD.  Apply to back for pain    mineral oil (FLEET OIL RETENTION) enema Place 1 enema rectally daily as needed for Constipation.    polyethylene glycol (GLYCOLAX) 17 gram PwPk Take 17 g by mouth daily as needed (Constipation). (Patient taking differently: Take 17 g by mouth 3 (three) times daily as needed (Constipation). )     Family History     Problem Relation (Age of Onset)    Asthma Mother        Tobacco Use    Smoking status: Current Every Day Smoker     Packs/day: 1.00     Years: 20.00     Pack years: 20.00     Types: Cigarettes     Start date: 4/3/1981     Last attempt to quit: 2020     Years since quittin.3    Smokeless tobacco: Never Used    Tobacco comment: states approx 1 per day.   Substance and Sexual Activity    Alcohol use: Not Currently     Alcohol/week: 0.0 standard drinks    Drug use: Not Currently     Types: Heroin     Comment: states hasn't done any in a while    Sexual activity: Yes     Partners: Female     Review of Systems   Constitutional: Negative for chills and fever.   Eyes: Negative for photophobia and visual disturbance.   Respiratory: Positive for shortness of breath. Negative for cough.    Cardiovascular: Positive for chest pain. Negative for palpitations and leg swelling.   Gastrointestinal: Negative for abdominal pain, diarrhea, nausea and vomiting.   Genitourinary: Negative for frequency, hematuria and urgency.   Skin: Negative for pallor, rash and wound.   Neurological: Negative  for light-headedness and headaches.   Psychiatric/Behavioral: Negative for confusion and decreased concentration.     Objective:     Vital Signs (Most Recent):  Temp: 98.1 °F (36.7 °C) (09/27/20 1233)  Pulse: 85 (09/27/20 1233)  Resp: 17 (09/27/20 1233)  BP: (!) 125/58 (09/27/20 1233)  SpO2: (!) 94 % (09/27/20 1233) Vital Signs (24h Range):  Temp:  [98.1 °F (36.7 °C)-98.2 °F (36.8 °C)] 98.1 °F (36.7 °C)  Pulse:  [] 85  Resp:  [16-24] 17  SpO2:  [94 %-100 %] 94 %  BP: (101-131)/(58-72) 125/58     Weight: 54.4 kg (120 lb)  Body mass index is 18.25 kg/m².    Physical Exam  Vitals signs and nursing note reviewed.   Constitutional:       General: He is not in acute distress.     Appearance: He is well-developed.   HENT:      Head: Normocephalic and atraumatic.      Right Ear: External ear normal.      Left Ear: External ear normal.      Nose: Nose normal.   Eyes:      Conjunctiva/sclera: Conjunctivae normal.      Pupils: Pupils are equal, round, and reactive to light.   Neck:      Musculoskeletal: Normal range of motion and neck supple.   Cardiovascular:      Rate and Rhythm: Normal rate and regular rhythm.   Pulmonary:      Effort: Pulmonary effort is normal. No respiratory distress.      Breath sounds: Normal breath sounds. No wheezing or rales.   Abdominal:      General: Bowel sounds are normal. There is no distension.      Palpations: Abdomen is soft.      Tenderness: There is no abdominal tenderness.      Comments: No palpable hepatomegaly or splenomegaly   Musculoskeletal: Normal range of motion.         General: No tenderness.   Skin:     General: Skin is warm and dry.   Neurological:      Mental Status: He is alert and oriented to person, place, and time.   Psychiatric:         Behavior: Behavior is agitated.         Thought Content: Thought content normal.           CRANIAL NERVES     CN III, IV, VI   Pupils are equal, round, and reactive to light.       Significant Labs: All pertinent labs within the past  24 hours have been reviewed.    Significant Imaging: I have reviewed all pertinent imaging results/findings within the past 24 hours.    Assessment/Plan:     * Elevated troponin  EKG without evidence of acute ischemia, currently pain free, Cardiology consulted, appreciate recs.  Plan is for cath in am, NPO p MN    CAD (coronary artery disease)  Elevated troponin, Cardiology consult, appreciate recs, continue home regimen of ASA/statin    Insomnia  Stable, no acute issue, provide as needed sleep aid    Tobacco use disorder, moderate, dependence  5 minutes spent counseling the patient on smoking cessation and he is not currently ready to stop smoking. He will be offereded a nicotine transdermal patch while hospitalized and monitored for withdrawal.  Will provide additional smoking cessation counseling prior to discharge.     Chronic hepatitis C  Stable, no acute issue    Intravenous drug abuse  Currently on methadone, continue home regimen    Hyperlipidemia  Continue statin    Anemia of chronic disease  Patient H/H stable and consistent with baseline. No evidence acute bleeding or indication for transfusion at this time.      Essential hypertension  Well controlled, not currently on home antihypertensives, monitor blood pressure, adjust as needed.     Type 1 diabetes mellitus with hyperglycemia  Last HgbA1c   Lab Results   Component Value Date    HGBA1C 6.9 (H) 05/25/2020     PRN sliding scale insulin  ACHS glucose monitoring   ADA diet       VTE Risk Mitigation (From admission, onward)         Ordered     enoxaparin injection 40 mg  Every 24 hours      09/27/20 9828              Cj Ness Jr., APRN, AGACN-BC  Hospitalist - Department of Hospital Medicine  [facility] [address] [place] [address]  Office #: [phone]; Pager #: [phone]

## 2020-09-27 NOTE — ASSESSMENT & PLAN NOTE
Last HgbA1c   Lab Results   Component Value Date    HGBA1C 6.9 (H) 05/25/2020     PRN sliding scale insulin  ACHS glucose monitoring   ADA diet

## 2020-09-27 NOTE — ED TRIAGE NOTES
Shortness of Breath (sob with new Hx: CHF. complaints of chest pain also. all started yesterday. chest pains are center chest, non radiating, described as buning.  Some abd tenderness.   EMS states room air sat of 96%.  Pt took own breathing tx at home without relief.

## 2020-09-27 NOTE — Clinical Note
68 ml injected throughout the case. 12 mL total wasted during the case. 80 mL total used in the case.

## 2020-09-27 NOTE — ED NOTES
Called to give report and soke to Magnolia Monson she states there are no rooms or nurses avaiable to take the pt at this time.

## 2020-09-27 NOTE — Clinical Note
Catheter is inserted into the ostium   left main. Angiography performed of the left coronary arteries in multiple views. Wire removed prior to angiography.

## 2020-09-27 NOTE — ASSESSMENT & PLAN NOTE
Well controlled, not currently on home antihypertensives, monitor blood pressure, adjust as needed.

## 2020-09-27 NOTE — ED NOTES
"Pt aware he needs to give a urine sample but states "I don't have any to give". Will try later.   "

## 2020-09-27 NOTE — ASSESSMENT & PLAN NOTE
EKG without evidence of acute ischemia, currently pain free, Cardiology consulted, appreciate recs.  Plan is for cath in am, NPO p MN

## 2020-09-27 NOTE — HPI
55 y.o. male with DM type 1, hypertension, anemia chronic disease, hyperlipidemia, IV drug abuse currently on methadone, chronic hep C, tobacco use, insomnia, and coronary artery disease presents with complaint of dyspnea.  Has been intermittent for the past few days, associated with chest pain.  Pain is located mid chest, and also intermittent, does not radiate, no known exacerbating or alleviating factors, no attempted self treatment.  Denies fever, chills, cough palpitations, orthopnea, PND, dizziness, syncope, nausea, vomiting, diarrhea, abdominal pain, bloody or black stools, or dysuria.  In the ED, found to have an elevated troponin of 0.164.  This is down trending from a recent presentation from which she left AMA.  EKG without evidence of acute ischemia.  Routine labs, chest x-ray unremarkable for acute abnormality.  Cardiology consulted and planned for left heart catheterization in the morning.  Placed in observation.

## 2020-09-27 NOTE — NURSING
Upon entering room, pt is taking his home medication (methadone) supplied by his mother. Patient instructed not to take medications from home without MD approval. Pt took it anyway. There are no pills left in the bottle.

## 2020-09-27 NOTE — HPI
Cali Mabry is a 55-year-old male who presents with complaints of shortness of breath and chest discomfort.  Presented a few days prior but left AMA.  Elevated troponin.  Since then it appears it is trended down.  EKG sinus tachycardia.  Cardiovascular risk factors male, diabetes, hyperlipidemia.  Coronary calcification noted on CT from April of this year.  The patient thinks he has had a prior stent but he is unsure.  History of drug use.    Echocardiogram 09/27/2020:    · The left ventricle is normal in size with normal systolic function. The estimated ejection fraction is 60%.  · There is left ventricular concentric remodeling.  · Normal left ventricular diastolic function.  · Normal right ventricular systolic function.  · No pulmonary hypertension  · Normal central venous pressure (3 mmHg).  · The estimated PA systolic pressure is 13 mmHg.    Nuclear stress 03/15/2019:    · The perfusion scan is free of evidence from myocardial ischemia or injury.  · There is a mild intensity fixed defect in the inferior wall of the left ventricle secondary to diaphragm attenuation.  · An ejection fraction of 53 % at rest  · The EKG portion of this study is negative for myocardial ischemia.

## 2020-09-27 NOTE — SUBJECTIVE & OBJECTIVE
Past Medical History:   Diagnosis Date    Acute on chronic pancreatitis 4/3/2016    CHF (congestive heart failure)     DKA (diabetic ketoacidoses) 02/2020    DM (diabetes mellitus), type 1     HCV (hepatitis C virus)     high VL     HTN (hypertension)     Hypomagnesemia 5/25/2020    IVDU (intravenous drug user)     Heroin    Pancreatitis        Past Surgical History:   Procedure Laterality Date    CARDIAC SURGERY  1999    stent placed. ([facility])    ESOPHAGOGASTRODUODENOSCOPY N/A 3/5/2020    Gastritis.  No H pylori.  Completed PPI for 1 month.  Procedure: EGD (ESOPHAGOGASTRODUODENOSCOPY);  Surgeon: Brinda Rene MD;  Location: [facility];  Service: Endoscopy;  Laterality: N/A;  W421 A; [id number]    SHOULDER SURGERY  2013    right shoulder, spur removal.       Review of patient's allergies indicates:  No Known Allergies    No current facility-administered medications on file prior to encounter.      Current Outpatient Medications on File Prior to Encounter   Medication Sig    acetaminophen (TYLENOL) 650 MG TbSR Take 650 mg by mouth every 8 (eight) hours as needed.    albuterol-ipratropium (DUO-NEB) 2.5 mg-0.5 mg/3 mL nebulizer solution Take 3 mLs by nebulization every 4 (four) hours as needed for Wheezing or Shortness of Breath. Rescue    ASCORBIC ACID, VITAMIN C, ORAL Take 1 tablet by mouth once daily.    aspirin 81 MG Chew Take 1 tablet (81 mg total) by mouth once daily.    atorvastatin (LIPITOR) 20 MG tablet Take 20 mg by mouth once daily.    bisacodyL (DULCOLAX) 10 mg Supp Place 10 mg rectally daily as needed.    divalproex (DEPAKOTE) 125 MG EC tablet Take 125 mg by mouth every 8 (eight) hours.    docusate sodium (COLACE) 100 MG capsule Take 1 capsule (100 mg total) by mouth 2 (two) times daily as needed for Constipation.    DULoxetine (CYMBALTA) 60 MG capsule Take 1 capsule (60 mg total) by mouth once daily.    folic acid (FOLVITE) 800 MCG Tab Take 800 mcg by mouth once daily.     folic acid-vit B6-vit B12 2.5-25-2 mg (FOLBIC OR EQUIV) 2.5-25-2 mg Tab Take 1 tablet by mouth once daily.    gabapentin (NEURONTIN) 300 MG capsule Take 2 capsules (600 mg total) by mouth 3 (three) times daily.    HALOPERIDOL LACTATE INJ Inject 1 mg as directed every 4 (four) hours as needed.    hydroxyzine HCL (ATARAX) 25 MG tablet Take 1 tablet (25 mg total) by mouth 3 (three) times daily as needed for Anxiety.    insulin aspart U-100 (NOVOLOG) 100 unit/mL injection Inject 1-10 Units into the skin 3 (three) times daily with meals.    insulin lispro 100 unit/mL injection Inject 15 Units into the skin 3 (three) times daily before meals.    L. acidophilus/Strept/La p-[place] (RISAQUAD ORAL) Take 1 capsule by mouth once daily.    melatonin (MELATIN) 3 mg tablet Take 2 tablets (6 mg total) by mouth nightly as needed for Insomnia.    multivitamin (THERAGRAN) per tablet Take 1 tablet by mouth once daily.    naloxone (NARCAN) 4 mg/actuation Spry 4mg by nasal route as needed for opioid overdose; may repeat every 2-3 minutes in alternating nostrils until medical help arrives. Call 911    nitroGLYCERIN (NITROSTAT) 0.4 MG SL tablet Place 1 tablet (0.4 mg total) under the tongue every 5 (five) minutes as needed for Chest pain.    ondansetron (ZOFRAN) 2 mg/mL injection Inject 4 mg into the vein every 6 (six) hours as needed.    QUEtiapine (SEROQUEL) 50 MG tablet Take 50 mg by mouth every evening.    traZODone (DESYREL) 50 MG tablet Take 25 mg by mouth every evening.    albuterol (PROVENTIL/VENTOLIN HFA) 90 mcg/actuation inhaler Inhale 1-2 puffs into the lungs every 6 (six) hours as needed for Wheezing or Shortness of Breath. Rescue    aspirin (ECOTRIN) 325 MG EC tablet Take 1 tablet (325 mg total) by mouth once daily.    b complex vitamins tablet Take 1 tablet by mouth once daily.    blood sugar diagnostic Strp Use To check blood glucose three times daily,    blood-glucose meter Misc To check Blood glucose three  times daily,    calcium carbonate (TUMS) 200 mg calcium (500 mg) chewable tablet Take 1 tablet (500 mg total) by mouth daily as needed for Heartburn.    diphenhydrAMINE (BENADRYL) 25 mg capsule Take 25 mg by mouth every 6 (six) hours as needed for Itching.    glucagon, human recombinant, (GLUCAGEN HYPOKIT) 1 mg SolR Inject 1 mg into the muscle as needed.    glucose 4 GM chewable tablet Take 16 g by mouth as needed for Low blood sugar.    insulin  unit/mL injection Inject 45 Units into the skin every evening.    lancets Misc To check BG 3 times daily, to use with insurance preferred meter    lidocaine (LIDODERM) 5 % Place 1 patch onto the skin once daily. Remove & Discard patch within 12 hours or as directed by MD.  Apply to shoulder for pain    lidocaine (LIDODERM) 5 % Place 1 patch onto the skin daily as needed. Remove & Discard patch within 12 hours or as directed by MD.  Apply to back for pain    mineral oil (FLEET OIL RETENTION) enema Place 1 enema rectally daily as needed for Constipation.    polyethylene glycol (GLYCOLAX) 17 gram PwPk Take 17 g by mouth daily as needed (Constipation). (Patient taking differently: Take 17 g by mouth 3 (three) times daily as needed (Constipation). )     Family History     Problem Relation (Age of Onset)    Asthma Mother        Tobacco Use    Smoking status: Current Every Day Smoker     Packs/day: 1.00     Years: 20.00     Pack years: 20.00     Types: Cigarettes     Start date: 4/3/1981     Last attempt to quit: 2020     Years since quittin.3    Smokeless tobacco: Never Used    Tobacco comment: states approx 1 per day.   Substance and Sexual Activity    Alcohol use: Not Currently     Alcohol/week: 0.0 standard drinks    Drug use: Not Currently     Types: Heroin     Comment: states hasn't done any in a while    Sexual activity: Yes     Partners: Female     Review of Systems   Constitutional: Negative for chills and fever.   Eyes: Negative for  photophobia and visual disturbance.   Respiratory: Positive for shortness of breath. Negative for cough.    Cardiovascular: Positive for chest pain. Negative for palpitations and leg swelling.   Gastrointestinal: Negative for abdominal pain, diarrhea, nausea and vomiting.   Genitourinary: Negative for frequency, hematuria and urgency.   Skin: Negative for pallor, rash and wound.   Neurological: Negative for light-headedness and headaches.   Psychiatric/Behavioral: Negative for confusion and decreased concentration.     Objective:     Vital Signs (Most Recent):  Temp: 98.1 °F (36.7 °C) (09/27/20 1233)  Pulse: 85 (09/27/20 1233)  Resp: 17 (09/27/20 1233)  BP: (!) 125/58 (09/27/20 1233)  SpO2: (!) 94 % (09/27/20 1233) Vital Signs (24h Range):  Temp:  [98.1 °F (36.7 °C)-98.2 °F (36.8 °C)] 98.1 °F (36.7 °C)  Pulse:  [] 85  Resp:  [16-24] 17  SpO2:  [94 %-100 %] 94 %  BP: (101-131)/(58-72) 125/58     Weight: 54.4 kg (120 lb)  Body mass index is 18.25 kg/m².    Physical Exam  Vitals signs and nursing note reviewed.   Constitutional:       General: He is not in acute distress.     Appearance: He is well-developed.   HENT:      Head: Normocephalic and atraumatic.      Right Ear: External ear normal.      Left Ear: External ear normal.      Nose: Nose normal.   Eyes:      Conjunctiva/sclera: Conjunctivae normal.      Pupils: Pupils are equal, round, and reactive to light.   Neck:      Musculoskeletal: Normal range of motion and neck supple.   Cardiovascular:      Rate and Rhythm: Normal rate and regular rhythm.   Pulmonary:      Effort: Pulmonary effort is normal. No respiratory distress.      Breath sounds: Normal breath sounds. No wheezing or rales.   Abdominal:      General: Bowel sounds are normal. There is no distension.      Palpations: Abdomen is soft.      Tenderness: There is no abdominal tenderness.      Comments: No palpable hepatomegaly or splenomegaly   Musculoskeletal: Normal range of motion.          General: No tenderness.   Skin:     General: Skin is warm and dry.   Neurological:      Mental Status: He is alert and oriented to person, place, and time.   Psychiatric:         Behavior: Behavior is agitated.         Thought Content: Thought content normal.           CRANIAL NERVES     CN III, IV, VI   Pupils are equal, round, and reactive to light.       Significant Labs: All pertinent labs within the past 24 hours have been reviewed.    Significant Imaging: I have reviewed all pertinent imaging results/findings within the past 24 hours.

## 2020-09-27 NOTE — H&P (VIEW-ONLY)
[facility]  Cardiology  Consult Note    Patient Name: Cali Mabry  MRN: [id number]  Admission Date: 9/27/2020  Hospital Length of Stay: 0 days  Code Status: Full Code   Attending Provider: Rosario Perez MD   Consulting Provider: Fito Rangel MD  Primary Care Physician: Cullen Zimmer MD  Principal Problem:<principal problem not specified>    Patient information was obtained from patient, past medical records and ER records.     Inpatient consult to Cardiology  Consult performed by: Fito Rangel MD  Consult ordered by: Jaycob Rueda MD        Subjective:     Chief Complaint:  Chest pain/shortness of breath     HPI:   Cali Mabry is a 55-year-old male who presents with complaints of shortness of breath and chest discomfort.  Presented a few days prior but left AMA.  Elevated troponin.  Since then it appears it is trended down.  EKG sinus tachycardia.  Cardiovascular risk factors male, diabetes, hyperlipidemia.  Coronary calcification noted on CT from April of this year.  The patient thinks he has had a prior stent but he is unsure.  History of drug use.    Echocardiogram 09/27/2020:    · The left ventricle is normal in size with normal systolic function. The estimated ejection fraction is 60%.  · There is left ventricular concentric remodeling.  · Normal left ventricular diastolic function.  · Normal right ventricular systolic function.  · No pulmonary hypertension  · Normal central venous pressure (3 mmHg).  · The estimated PA systolic pressure is 13 mmHg.    Nuclear stress 03/15/2019:    · The perfusion scan is free of evidence from myocardial ischemia or injury.  · There is a mild intensity fixed defect in the inferior wall of the left ventricle secondary to diaphragm attenuation.  · An ejection fraction of 53 % at rest  The EKG portion of this study is negative for myocardial ischemia.    Past Medical History:   Diagnosis Date    Acute on chronic pancreatitis  4/3/2016    CHF (congestive heart failure)     DKA (diabetic ketoacidoses) 02/2020    DM (diabetes mellitus), type 1     HCV (hepatitis C virus)     high VL     HTN (hypertension)     Hypomagnesemia 5/25/2020    IVDU (intravenous drug user)     Heroin    Pancreatitis        Past Surgical History:   Procedure Laterality Date    CARDIAC SURGERY  1999    stent placed. ([facility])    ESOPHAGOGASTRODUODENOSCOPY N/A 3/5/2020    Gastritis.  No H pylori.  Completed PPI for 1 month.  Procedure: EGD (ESOPHAGOGASTRODUODENOSCOPY);  Surgeon: Brinda Rene MD;  [facility];  Service: Endoscopy;  Laterality: N/A;  [id number] [id number]; [phone]    SHOULDER SURGERY  2013    right shoulder, spur removal.       Review of patient's allergies indicates:  No Known Allergies    No current facility-administered medications on file prior to encounter.      Current Outpatient Medications on File Prior to Encounter   Medication Sig    acetaminophen (TYLENOL) 650 MG TbSR Take 650 mg by mouth every 8 (eight) hours as needed.    albuterol-ipratropium (DUO-NEB) 2.5 mg-0.5 mg/3 mL nebulizer solution Take 3 mLs by nebulization every 4 (four) hours as needed for Wheezing or Shortness of Breath. Rescue    ASCORBIC ACID, VITAMIN C, ORAL Take 1 tablet by mouth once daily.    aspirin 81 MG Chew Take 1 tablet (81 mg total) by mouth once daily.    atorvastatin (LIPITOR) 20 MG tablet Take 20 mg by mouth once daily.    bisacodyL (DULCOLAX) 10 mg Supp Place 10 mg rectally daily as needed.    divalproex (DEPAKOTE) 125 MG EC tablet Take 125 mg by mouth every 8 (eight) hours.    docusate sodium (COLACE) 100 MG capsule Take 1 capsule (100 mg total) by mouth 2 (two) times daily as needed for Constipation.    DULoxetine (CYMBALTA) 60 MG capsule Take 1 capsule (60 mg total) by mouth once daily.    folic acid (FOLVITE) 800 MCG Tab Take 800 mcg by mouth once daily.    folic acid-vit B6-vit B12 2.5-25-2 mg (FOLBIC OR EQUIV) 2.5-25-2 mg Tab  Take 1 tablet by mouth once daily.    gabapentin (NEURONTIN) 300 MG capsule Take 2 capsules (600 mg total) by mouth 3 (three) times daily.    HALOPERIDOL LACTATE INJ Inject 1 mg as directed every 4 (four) hours as needed.    hydroxyzine HCL (ATARAX) 25 MG tablet Take 1 tablet (25 mg total) by mouth 3 (three) times daily as needed for Anxiety.    insulin aspart U-100 (NOVOLOG) 100 unit/mL injection Inject 1-10 Units into the skin 3 (three) times daily with meals.    insulin lispro 100 unit/mL injection Inject 15 Units into the skin 3 (three) times daily before meals.    L. acidophilus/Strept/[place] p[place] (RISAQUAD ORAL) Take 1 capsule by mouth once daily.    melatonin (MELATIN) 3 mg tablet Take 2 tablets (6 mg total) by mouth nightly as needed for Insomnia.    multivitamin (THERAGRAN) per tablet Take 1 tablet by mouth once daily.    naloxone (NARCAN) 4 mg/actuation Spry 4mg by nasal route as needed for opioid overdose; may repeat every 2-3 minutes in alternating nostrils until medical help arrives. Call 911    nitroGLYCERIN (NITROSTAT) 0.4 MG SL tablet Place 1 tablet (0.4 mg total) under the tongue every 5 (five) minutes as needed for Chest pain.    ondansetron (ZOFRAN) 2 mg/mL injection Inject 4 mg into the vein every 6 (six) hours as needed.    QUEtiapine (SEROQUEL) 50 MG tablet Take 50 mg by mouth every evening.    traZODone (DESYREL) 50 MG tablet Take 25 mg by mouth every evening.    albuterol (PROVENTIL/VENTOLIN HFA) 90 mcg/actuation inhaler Inhale 1-2 puffs into the lungs every 6 (six) hours as needed for Wheezing or Shortness of Breath. Rescue    aspirin (ECOTRIN) 325 MG EC tablet Take 1 tablet (325 mg total) by mouth once daily.    b complex vitamins tablet Take 1 tablet by mouth once daily.    blood sugar diagnostic Strp Use To check blood glucose three times daily,    blood-glucose meter Misc To check Blood glucose three times daily,    calcium carbonate (TUMS) 200 mg calcium (500 mg)  chewable tablet Take 1 tablet (500 mg total) by mouth daily as needed for Heartburn.    diphenhydrAMINE (BENADRYL) 25 mg capsule Take 25 mg by mouth every 6 (six) hours as needed for Itching.    glucagon, human recombinant, (GLUCAGEN HYPOKIT) 1 mg SolR Inject 1 mg into the muscle as needed.    glucose 4 GM chewable tablet Take 16 g by mouth as needed for Low blood sugar.    insulin  unit/mL injection Inject 45 Units into the skin every evening.    lancets Misc To check BG 3 times daily, to use with insurance preferred meter    lidocaine (LIDODERM) 5 % Place 1 patch onto the skin once daily. Remove & Discard patch within 12 hours or as directed by MD.  Apply to shoulder for pain    lidocaine (LIDODERM) 5 % Place 1 patch onto the skin daily as needed. Remove & Discard patch within 12 hours or as directed by MD.  Apply to back for pain    mineral oil (FLEET OIL RETENTION) enema Place 1 enema rectally daily as needed for Constipation.    polyethylene glycol (GLYCOLAX) 17 gram PwPk Take 17 g by mouth daily as needed (Constipation). (Patient taking differently: Take 17 g by mouth 3 (three) times daily as needed (Constipation). )     Family History     Problem Relation (Age of Onset)    Asthma Mother        Tobacco Use    Smoking status: Current Every Day Smoker     Packs/day: 1.00     Years: 20.00     Pack years: 20.00     Types: Cigarettes     Start date: 4/3/1981     Last attempt to quit: 2020     Years since quittin.3    Smokeless tobacco: Never Used    Tobacco comment: states approx 1 per day.   Substance and Sexual Activity    Alcohol use: Not Currently     Alcohol/week: 0.0 standard drinks    Drug use: Not Currently     Types: Heroin     Comment: states hasn't done any in a while    Sexual activity: Yes     Partners: Female     Review of Systems   Cardiovascular: Positive for chest pain. Negative for dyspnea on exertion, irregular heartbeat, leg swelling, near-syncope, orthopnea and  paroxysmal nocturnal dyspnea.   Respiratory: Positive for shortness of breath. Negative for sputum production and wheezing.    Neurological: Positive for numbness. Negative for light-headedness and weakness.     Objective:     Vital Signs (Most Recent):  Temp: 98.1 °F (36.7 °C) (09/27/20 1233)  Pulse: 85 (09/27/20 1233)  Resp: 17 (09/27/20 1233)  BP: (!) 125/58 (09/27/20 1233)  SpO2: (!) 94 % (09/27/20 1233) Vital Signs (24h Range):  Temp:  [98.1 °F (36.7 °C)-98.2 °F (36.8 °C)] 98.1 °F (36.7 °C)  Pulse:  [] 85  Resp:  [16-24] 17  SpO2:  [94 %-100 %] 94 %  BP: (101-131)/(58-72) 125/58     Weight: 52.2 kg (115 lb)  Body mass index is 17.49 kg/m².    SpO2: (!) 94 %  O2 Device (Oxygen Therapy): room air    No intake or output data in the 24 hours ending 09/27/20 1327    Lines/Drains/Airways     None                 Physical Exam   Constitutional: He is oriented to person, place, and time. He appears cachectic. No distress.   Neck: No JVD present. Carotid bruit is not present.   Cardiovascular: Normal rate, regular rhythm and normal pulses.   Pulmonary/Chest: Effort normal and breath sounds normal.   Abdominal: Soft. Bowel sounds are normal. There is no abdominal tenderness.   Musculoskeletal:      Right lower leg: No edema.      Left lower leg: No edema.   Neurological: He is alert and oriented to person, place, and time.   Skin: He is not diaphoretic.   Psychiatric: He has a normal mood and affect. His speech is normal and behavior is normal.   Vitals reviewed.      Significant Labs:   CMP   Recent Labs   Lab 09/27/20  0907      K 4.4   CL 99   CO2 31*   *   BUN 14   CREATININE 1.2   CALCIUM 9.4   PROT 7.9   ALBUMIN 3.5   BILITOT 0.4   ALKPHOS 86   AST 18   ALT 30   ANIONGAP 8   ESTGFRAFRICA >60   EGFRNONAA >60   , CBC   Recent Labs   Lab 09/27/20  0907   WBC 6.73   HGB 12.2*   HCT 40.0      , Lipid Panel   Recent Labs   Lab 09/27/20  0907   CHOL 129   HDL 41   LDLCALC 71.6   TRIG 82   CHOLHDL  "31.8    and Troponin   Recent Labs   Lab 09/27/20  0907   TROPONINI 0.189*       Significant Imaging: Echocardiogram:   Transthoracic echo (TTE) complete (Cupid Only):   Results for orders placed or performed during the hospital encounter of 09/27/20   Echo Color Flow Doppler? Yes   Result Value Ref Range    BSA 1.58 m2    TDI SEPTAL 0.05 m/s    LV LATERAL E/E' RATIO 6.50 m/s    LV SEPTAL E/E' RATIO 10.40 m/s    AORTIC VALVE CUSP SEPERATION 2 cm    TDI LATERAL 0.08 m/s    PV PEAK VELOCITY 0.93 cm/s    LVIDd 3.20 3.5 - 6.0 cm    IVS 1.40 0.6 - 1.1 cm    Posterior Wall 1.40 0.6 - 1.1 cm    LVIDs 2.30 2.1 - 4.0 cm    FS 28 28 - 44 %    LV mass 153.05 g    LA size 2.20 cm    RVDD 3.00 cm    TAPSE 1.50 cm    RV S' 15 cm/s    Left Ventricle Relative Wall Thickness 0.88 cm    AV mean gradient 3 mmHg    AV valve area 2.13 cm2    AV Velocity Ratio 0.73     AV index (prosthetic) 0.75     E/A ratio 0.75     Mean e' 0.07 m/s    E wave decelartion time 219.00 msec    Pulm vein "A" wave 111.00 msec    Pulm vein S/D ratio 1.22     LVOT diameter 1.90 cm    LVOT area 2.8 cm2    LVOT peak brodie 0.8 m/s    LVOT peak VTI 16.20 cm    Ao peak brodie 1.1 m/s    Ao VTI 21.60 cm    LVOT stroke volume 45.91 cm3    AV peak gradient 5 mmHg    E/E' ratio 8.00 m/s    MV Peak E Brodie 0.52 m/s    TR Max Brodie 1.55 m/s    MV Peak A Brodie 0.69 m/s    PV Peak S Brodie 0.44 m/s    PV Peak D Brodie 0.36 m/s    LV Mass Index 95 g/m2    Echo EF Estimated 56 %    RA Major Axis 3.50 cm    Left Atrium Major Axis 3.80 cm    Triscuspid Valve Regurgitation Peak Gradient 10 mmHg    RA Width 3.50 cm    Right Atrial Pressure (from IVC) 3 mmHg    IVC diameter 1.241 cm    TV rest pulmonary artery pressure 13 mmHg    Sinus 2.90 cm    STJ 2.40 cm    Ascending aorta 3.10 cm    Narrative    · The left ventricle is normal in size with normal systolic function. The   estimated ejection fraction is 60%.  · There is left ventricular concentric remodeling.  · Normal left ventricular " diastolic function.  · Normal right ventricular systolic function.  · No pulmonary hypertension  · Normal central venous pressure (3 mmHg).  · The estimated PA systolic pressure is 13 mmHg.        Assessment and Plan:     Elevated troponin  Trending downward from a few days ago.  Cardiovascular risk factors of male, diabetes, hyperlipidemia and tobacco use.  Discussed coronary angiography with patient.  He is amenable.    Risks, benefits and alternatives of the catheterization procedure were discussed with the patient.The risks of coronary angiography include but are not limited to: bleeding, infection, death, heart attack, arrhythmia, kidney injury or failure, potential need for dialysis, allergic reactions, stroke, need for emergency surgery, hematoma, pseudoaneurysm etc.  Should stenting be indicated, the patient has agreed to dual anti-platelet therapy for 1-consecutive year with a drug-eluting stent and a minimum of 1-month with the use of a bare metal stent. Additionally, pt is aware that non-compliance is likely to result in stent clotting with heart attack, heart failure, and/or death  The risks of moderate sedation include hypotension, respiratory depression, arrhythmias, bronchospasm, and death. Informed consent was obtained and the  patient is agreeable to proceed with the procedure. Consent was placed on the chart.      Hyperlipidemia  Decent profile.  Continue statin.    Essential hypertension  Monitor        VTE Risk Mitigation (From admission, onward)         Ordered     enoxaparin injection 40 mg  Every 24 hours      09/27/20 5717                Thank you for your consult. I will follow-up with patient. Please contact us if you have any additional questions.    Fito Rangel MD  Cardiology   [facility]

## 2020-09-27 NOTE — CONSULTS
[facility]  Cardiology  Consult Note    Patient Name: Cali Mabry  MRN: [id number]  Admission Date: 9/27/2020  Hospital Length of Stay: 0 days  Code Status: Full Code   Attending Provider: Rosario Perez MD   Consulting Provider: Fito Rangel MD  Primary Care Physician: Cullen Zimmer MD  Principal Problem:<principal problem not specified>    Patient information was obtained from patient, past medical records and ER records.     Inpatient consult to Cardiology  Consult performed by: Fito Rangel MD  Consult ordered by: Jaycob Rueda MD        Subjective:     Chief Complaint:  Chest pain/shortness of breath     HPI:   Cali Mabry is a 55-year-old male who presents with complaints of shortness of breath and chest discomfort.  Presented a few days prior but left AMA.  Elevated troponin.  Since then it appears it is trended down.  EKG sinus tachycardia.  Cardiovascular risk factors male, diabetes, hyperlipidemia.  Coronary calcification noted on CT from April of this year.  The patient thinks he has had a prior stent but he is unsure.  History of drug use.    Echocardiogram 09/27/2020:    · The left ventricle is normal in size with normal systolic function. The estimated ejection fraction is 60%.  · There is left ventricular concentric remodeling.  · Normal left ventricular diastolic function.  · Normal right ventricular systolic function.  · No pulmonary hypertension  · Normal central venous pressure (3 mmHg).  · The estimated PA systolic pressure is 13 mmHg.    Nuclear stress 03/15/2019:    · The perfusion scan is free of evidence from myocardial ischemia or injury.  · There is a mild intensity fixed defect in the inferior wall of the left ventricle secondary to diaphragm attenuation.  · An ejection fraction of 53 % at rest  The EKG portion of this study is negative for myocardial ischemia.    Past Medical History:   Diagnosis Date    Acute on chronic pancreatitis  4/3/2016    CHF (congestive heart failure)     DKA (diabetic ketoacidoses) 02/2020    DM (diabetes mellitus), type 1     HCV (hepatitis C virus)     high VL     HTN (hypertension)     Hypomagnesemia 5/25/2020    IVDU (intravenous drug user)     Heroin    Pancreatitis        Past Surgical History:   Procedure Laterality Date    CARDIAC SURGERY  1999    stent placed. ([facility])    ESOPHAGOGASTRODUODENOSCOPY N/A 3/5/2020    Gastritis.  No H pylori.  Completed PPI for 1 month.  Procedure: EGD (ESOPHAGOGASTRODUODENOSCOPY);  Surgeon: Brinda Rene MD;  Location: [facility];  Service: Endoscopy;  Laterality: N/A;  [id number] A; [phone]    SHOULDER SURGERY  2013    right shoulder, spur removal.       Review of patient's allergies indicates:  No Known Allergies    No current facility-administered medications on file prior to encounter.      Current Outpatient Medications on File Prior to Encounter   Medication Sig    acetaminophen (TYLENOL) 650 MG TbSR Take 650 mg by mouth every 8 (eight) hours as needed.    albuterol-ipratropium (DUO-NEB) 2.5 mg-0.5 mg/3 mL nebulizer solution Take 3 mLs by nebulization every 4 (four) hours as needed for Wheezing or Shortness of Breath. Rescue    ASCORBIC ACID, VITAMIN C, ORAL Take 1 tablet by mouth once daily.    aspirin 81 MG Chew Take 1 tablet (81 mg total) by mouth once daily.    atorvastatin (LIPITOR) 20 MG tablet Take 20 mg by mouth once daily.    bisacodyL (DULCOLAX) 10 mg Supp Place 10 mg rectally daily as needed.    divalproex (DEPAKOTE) 125 MG EC tablet Take 125 mg by mouth every 8 (eight) hours.    docusate sodium (COLACE) 100 MG capsule Take 1 capsule (100 mg total) by mouth 2 (two) times daily as needed for Constipation.    DULoxetine (CYMBALTA) 60 MG capsule Take 1 capsule (60 mg total) by mouth once daily.    folic acid (FOLVITE) 800 MCG Tab Take 800 mcg by mouth once daily.    folic acid-vit B6-vit B12 2.5-25-2 mg (FOLBIC OR EQUIV) 2.5-25-2 mg Tab  Take 1 tablet by mouth once daily.    gabapentin (NEURONTIN) 300 MG capsule Take 2 capsules (600 mg total) by mouth 3 (three) times daily.    HALOPERIDOL LACTATE INJ Inject 1 mg as directed every 4 (four) hours as needed.    hydroxyzine HCL (ATARAX) 25 MG tablet Take 1 tablet (25 mg total) by mouth 3 (three) times daily as needed for Anxiety.    insulin aspart U-100 (NOVOLOG) 100 unit/mL injection Inject 1-10 Units into the skin 3 (three) times daily with meals.    insulin lispro 100 unit/mL injection Inject 15 Units into the skin 3 (three) times daily before meals.    L. acidophilus/Strept/[place] p[place] (RISAQUAD ORAL) Take 1 capsule by mouth once daily.    melatonin (MELATIN) 3 mg tablet Take 2 tablets (6 mg total) by mouth nightly as needed for Insomnia.    multivitamin (THERAGRAN) per tablet Take 1 tablet by mouth once daily.    naloxone (NARCAN) 4 mg/actuation Spry 4mg by nasal route as needed for opioid overdose; may repeat every 2-3 minutes in alternating nostrils until medical help arrives. Call 911    nitroGLYCERIN (NITROSTAT) 0.4 MG SL tablet Place 1 tablet (0.4 mg total) under the tongue every 5 (five) minutes as needed for Chest pain.    ondansetron (ZOFRAN) 2 mg/mL injection Inject 4 mg into the vein every 6 (six) hours as needed.    QUEtiapine (SEROQUEL) 50 MG tablet Take 50 mg by mouth every evening.    traZODone (DESYREL) 50 MG tablet Take 25 mg by mouth every evening.    albuterol (PROVENTIL/VENTOLIN HFA) 90 mcg/actuation inhaler Inhale 1-2 puffs into the lungs every 6 (six) hours as needed for Wheezing or Shortness of Breath. Rescue    aspirin (ECOTRIN) 325 MG EC tablet Take 1 tablet (325 mg total) by mouth once daily.    b complex vitamins tablet Take 1 tablet by mouth once daily.    blood sugar diagnostic Strp Use To check blood glucose three times daily,    blood-glucose meter Misc To check Blood glucose three times daily,    calcium carbonate (TUMS) 200 mg calcium (500 mg)  chewable tablet Take 1 tablet (500 mg total) by mouth daily as needed for Heartburn.    diphenhydrAMINE (BENADRYL) 25 mg capsule Take 25 mg by mouth every 6 (six) hours as needed for Itching.    glucagon, human recombinant, (GLUCAGEN HYPOKIT) 1 mg SolR Inject 1 mg into the muscle as needed.    glucose 4 GM chewable tablet Take 16 g by mouth as needed for Low blood sugar.    insulin  unit/mL injection Inject 45 Units into the skin every evening.    lancets Misc To check BG 3 times daily, to use with insurance preferred meter    lidocaine (LIDODERM) 5 % Place 1 patch onto the skin once daily. Remove & Discard patch within 12 hours or as directed by MD.  Apply to shoulder for pain    lidocaine (LIDODERM) 5 % Place 1 patch onto the skin daily as needed. Remove & Discard patch within 12 hours or as directed by MD.  Apply to back for pain    mineral oil (FLEET OIL RETENTION) enema Place 1 enema rectally daily as needed for Constipation.    polyethylene glycol (GLYCOLAX) 17 gram PwPk Take 17 g by mouth daily as needed (Constipation). (Patient taking differently: Take 17 g by mouth 3 (three) times daily as needed (Constipation). )     Family History     Problem Relation (Age of Onset)    Asthma Mother        Tobacco Use    Smoking status: Current Every Day Smoker     Packs/day: 1.00     Years: 20.00     Pack years: 20.00     Types: Cigarettes     Start date: 4/3/1981     Last attempt to quit: 2020     Years since quittin.3    Smokeless tobacco: Never Used    Tobacco comment: states approx 1 per day.   Substance and Sexual Activity    Alcohol use: Not Currently     Alcohol/week: 0.0 standard drinks    Drug use: Not Currently     Types: Heroin     Comment: states hasn't done any in a while    Sexual activity: Yes     Partners: Female     Review of Systems   Cardiovascular: Positive for chest pain. Negative for dyspnea on exertion, irregular heartbeat, leg swelling, near-syncope, orthopnea and  paroxysmal nocturnal dyspnea.   Respiratory: Positive for shortness of breath. Negative for sputum production and wheezing.    Neurological: Positive for numbness. Negative for light-headedness and weakness.     Objective:     Vital Signs (Most Recent):  Temp: 98.1 °F (36.7 °C) (09/27/20 1233)  Pulse: 85 (09/27/20 1233)  Resp: 17 (09/27/20 1233)  BP: (!) 125/58 (09/27/20 1233)  SpO2: (!) 94 % (09/27/20 1233) Vital Signs (24h Range):  Temp:  [98.1 °F (36.7 °C)-98.2 °F (36.8 °C)] 98.1 °F (36.7 °C)  Pulse:  [] 85  Resp:  [16-24] 17  SpO2:  [94 %-100 %] 94 %  BP: (101-131)/(58-72) 125/58     Weight: 52.2 kg (115 lb)  Body mass index is 17.49 kg/m².    SpO2: (!) 94 %  O2 Device (Oxygen Therapy): room air    No intake or output data in the 24 hours ending 09/27/20 1327    Lines/Drains/Airways     None                 Physical Exam   Constitutional: He is oriented to person, place, and time. He appears cachectic. No distress.   Neck: No JVD present. Carotid bruit is not present.   Cardiovascular: Normal rate, regular rhythm and normal pulses.   Pulmonary/Chest: Effort normal and breath sounds normal.   Abdominal: Soft. Bowel sounds are normal. There is no abdominal tenderness.   Musculoskeletal:      Right lower leg: No edema.      Left lower leg: No edema.   Neurological: He is alert and oriented to person, place, and time.   Skin: He is not diaphoretic.   Psychiatric: He has a normal mood and affect. His speech is normal and behavior is normal.   Vitals reviewed.      Significant Labs:   CMP   Recent Labs   Lab 09/27/20  0907      K 4.4   CL 99   CO2 31*   *   BUN 14   CREATININE 1.2   CALCIUM 9.4   PROT 7.9   ALBUMIN 3.5   BILITOT 0.4   ALKPHOS 86   AST 18   ALT 30   ANIONGAP 8   ESTGFRAFRICA >60   EGFRNONAA >60   , CBC   Recent Labs   Lab 09/27/20  0907   WBC 6.73   HGB 12.2*   HCT 40.0      , Lipid Panel   Recent Labs   Lab 09/27/20  0907   CHOL 129   HDL 41   LDLCALC 71.6   TRIG 82   CHOLHDL  "31.8    and Troponin   Recent Labs   Lab 09/27/20  0907   TROPONINI 0.189*       Significant Imaging: Echocardiogram:   Transthoracic echo (TTE) complete (Cupid Only):   Results for orders placed or performed during the hospital encounter of 09/27/20   Echo Color Flow Doppler? Yes   Result Value Ref Range    BSA 1.58 m2    TDI SEPTAL 0.05 m/s    LV LATERAL E/E' RATIO 6.50 m/s    LV SEPTAL E/E' RATIO 10.40 m/s    AORTIC VALVE CUSP SEPERATION 2 cm    TDI LATERAL 0.08 m/s    PV PEAK VELOCITY 0.93 cm/s    LVIDd 3.20 3.5 - 6.0 cm    IVS 1.40 0.6 - 1.1 cm    Posterior Wall 1.40 0.6 - 1.1 cm    LVIDs 2.30 2.1 - 4.0 cm    FS 28 28 - 44 %    LV mass 153.05 g    LA size 2.20 cm    RVDD 3.00 cm    TAPSE 1.50 cm    RV S' 15 cm/s    Left Ventricle Relative Wall Thickness 0.88 cm    AV mean gradient 3 mmHg    AV valve area 2.13 cm2    AV Velocity Ratio 0.73     AV index (prosthetic) 0.75     E/A ratio 0.75     Mean e' 0.07 m/s    E wave decelartion time 219.00 msec    Pulm vein "A" wave 111.00 msec    Pulm vein S/D ratio 1.22     LVOT diameter 1.90 cm    LVOT area 2.8 cm2    LVOT peak brodie 0.8 m/s    LVOT peak VTI 16.20 cm    Ao peak rbodie 1.1 m/s    Ao VTI 21.60 cm    LVOT stroke volume 45.91 cm3    AV peak gradient 5 mmHg    E/E' ratio 8.00 m/s    MV Peak E Brodie 0.52 m/s    TR Max Brodie 1.55 m/s    MV Peak A Brodie 0.69 m/s    PV Peak S Brodie 0.44 m/s    PV Peak D Brodie 0.36 m/s    LV Mass Index 95 g/m2    Echo EF Estimated 56 %    RA Major Axis 3.50 cm    Left Atrium Major Axis 3.80 cm    Triscuspid Valve Regurgitation Peak Gradient 10 mmHg    RA Width 3.50 cm    Right Atrial Pressure (from IVC) 3 mmHg    IVC diameter 1.241 cm    TV rest pulmonary artery pressure 13 mmHg    Sinus 2.90 cm    STJ 2.40 cm    Ascending aorta 3.10 cm    Narrative    · The left ventricle is normal in size with normal systolic function. The   estimated ejection fraction is 60%.  · There is left ventricular concentric remodeling.  · Normal left ventricular " diastolic function.  · Normal right ventricular systolic function.  · No pulmonary hypertension  · Normal central venous pressure (3 mmHg).  · The estimated PA systolic pressure is 13 mmHg.        Assessment and Plan:     Elevated troponin  Trending downward from a few days ago.  Cardiovascular risk factors of male, diabetes, hyperlipidemia and tobacco use.  Discussed coronary angiography with patient.  He is amenable.    Risks, benefits and alternatives of the catheterization procedure were discussed with the patient.The risks of coronary angiography include but are not limited to: bleeding, infection, death, heart attack, arrhythmia, kidney injury or failure, potential need for dialysis, allergic reactions, stroke, need for emergency surgery, hematoma, pseudoaneurysm etc.  Should stenting be indicated, the patient has agreed to dual anti-platelet therapy for 1-consecutive year with a drug-eluting stent and a minimum of 1-month with the use of a bare metal stent. Additionally, pt is aware that non-compliance is likely to result in stent clotting with heart attack, heart failure, and/or death  The risks of moderate sedation include hypotension, respiratory depression, arrhythmias, bronchospasm, and death. Informed consent was obtained and the  patient is agreeable to proceed with the procedure. Consent was placed on the chart.      Hyperlipidemia  Decent profile.  Continue statin.    Essential hypertension  Monitor        VTE Risk Mitigation (From admission, onward)         Ordered     enoxaparin injection 40 mg  Every 24 hours      09/27/20 4329                Thank you for your consult. I will follow-up with patient. Please contact us if you have any additional questions.    Fito Rangel MD  Cardiology   [facility]

## 2020-09-28 VITALS
HEART RATE: 95 BPM | WEIGHT: 120 LBS | DIASTOLIC BLOOD PRESSURE: 75 MMHG | TEMPERATURE: 98 F | OXYGEN SATURATION: 95 % | RESPIRATION RATE: 18 BRPM | HEIGHT: 68 IN | BODY MASS INDEX: 18.19 KG/M2 | SYSTOLIC BLOOD PRESSURE: 143 MMHG

## 2020-09-28 PROBLEM — I21.4 NSTEMI (NON-ST ELEVATED MYOCARDIAL INFARCTION): Status: ACTIVE | Noted: 2020-09-28

## 2020-09-28 PROBLEM — R06.02 SOB (SHORTNESS OF BREATH): Status: ACTIVE | Noted: 2020-09-28

## 2020-09-28 LAB
ESTIMATED AVG GLUCOSE: 194 MG/DL (ref 68–131)
HBA1C MFR BLD HPLC: 8.4 % (ref 4–5.6)
POC ACTIVATED CLOTTING TIME K: 428 SEC (ref 74–137)
POCT GLUCOSE: 127 MG/DL (ref 70–110)
POCT GLUCOSE: 285 MG/DL (ref 70–110)
SAMPLE: ABNORMAL

## 2020-09-28 PROCEDURE — 93454 PR CATH PLACE/CORONARY ANGIO, IMG SUPER/INTERP: ICD-10-PCS | Mod: 26,59,51, | Performed by: INTERNAL MEDICINE

## 2020-09-28 PROCEDURE — 25000003 PHARM REV CODE 250: Performed by: INTERNAL MEDICINE

## 2020-09-28 PROCEDURE — 25000242 PHARM REV CODE 250 ALT 637 W/ HCPCS: Performed by: HOSPITALIST

## 2020-09-28 PROCEDURE — 93454 CORONARY ARTERY ANGIO S&I: CPT | Mod: 26,59,51, | Performed by: INTERNAL MEDICINE

## 2020-09-28 PROCEDURE — 99152 MOD SED SAME PHYS/QHP 5/>YRS: CPT | Mod: ,,, | Performed by: INTERNAL MEDICINE

## 2020-09-28 PROCEDURE — 25000003 PHARM REV CODE 250: Performed by: HOSPITALIST

## 2020-09-28 PROCEDURE — 92920 PR PTCA: ICD-10-PCS | Mod: RC,,, | Performed by: INTERNAL MEDICINE

## 2020-09-28 PROCEDURE — C1769 GUIDE WIRE: HCPCS | Performed by: INTERNAL MEDICINE

## 2020-09-28 PROCEDURE — 92920 PRQ TRLUML C ANGIOP 1ART&/BR: CPT | Mod: RC,,, | Performed by: INTERNAL MEDICINE

## 2020-09-28 PROCEDURE — 99152 PR MOD CONSCIOUS SEDATION, SAME PHYS, 5+ YRS, FIRST 15 MIN: ICD-10-PCS | Mod: ,,, | Performed by: INTERNAL MEDICINE

## 2020-09-28 PROCEDURE — 63600175 PHARM REV CODE 636 W HCPCS: Performed by: NURSE PRACTITIONER

## 2020-09-28 PROCEDURE — C1887 CATHETER, GUIDING: HCPCS | Performed by: INTERNAL MEDICINE

## 2020-09-28 PROCEDURE — 85347 COAGULATION TIME ACTIVATED: CPT | Performed by: INTERNAL MEDICINE

## 2020-09-28 PROCEDURE — 92920 PRQ TRLUML C ANGIOP 1ART&/BR: CPT | Mod: RC | Performed by: INTERNAL MEDICINE

## 2020-09-28 PROCEDURE — 27201423 OPTIME MED/SURG SUP & DEVICES STERILE SUPPLY: Performed by: INTERNAL MEDICINE

## 2020-09-28 PROCEDURE — C1725 CATH, TRANSLUMIN NON-LASER: HCPCS | Performed by: INTERNAL MEDICINE

## 2020-09-28 PROCEDURE — 93454 CORONARY ARTERY ANGIO S&I: CPT | Mod: 59 | Performed by: INTERNAL MEDICINE

## 2020-09-28 PROCEDURE — 25500020 PHARM REV CODE 255: Performed by: INTERNAL MEDICINE

## 2020-09-28 PROCEDURE — 99900035 HC TECH TIME PER 15 MIN (STAT)

## 2020-09-28 PROCEDURE — C1894 INTRO/SHEATH, NON-LASER: HCPCS | Performed by: INTERNAL MEDICINE

## 2020-09-28 PROCEDURE — 63600175 PHARM REV CODE 636 W HCPCS: Performed by: INTERNAL MEDICINE

## 2020-09-28 PROCEDURE — 94761 N-INVAS EAR/PLS OXIMETRY MLT: CPT

## 2020-09-28 PROCEDURE — G0378 HOSPITAL OBSERVATION PER HR: HCPCS

## 2020-09-28 RX ORDER — LIDOCAINE HYDROCHLORIDE 10 MG/ML
INJECTION, SOLUTION EPIDURAL; INFILTRATION; INTRACAUDAL; PERINEURAL
Status: DISCONTINUED | OUTPATIENT
Start: 2020-09-28 | End: 2020-09-28 | Stop reason: HOSPADM

## 2020-09-28 RX ORDER — CLOPIDOGREL BISULFATE 75 MG/1
75 TABLET ORAL DAILY
Qty: 30 TABLET | Refills: 6 | Status: ON HOLD | OUTPATIENT
Start: 2020-09-28 | End: 2022-09-20

## 2020-09-28 RX ORDER — VERAPAMIL HYDROCHLORIDE 2.5 MG/ML
INJECTION, SOLUTION INTRAVENOUS
Status: DISCONTINUED | OUTPATIENT
Start: 2020-09-28 | End: 2020-09-28 | Stop reason: HOSPADM

## 2020-09-28 RX ORDER — HEPARIN SODIUM 1000 [USP'U]/ML
INJECTION, SOLUTION INTRAVENOUS; SUBCUTANEOUS
Status: DISCONTINUED | OUTPATIENT
Start: 2020-09-28 | End: 2020-09-28 | Stop reason: HOSPADM

## 2020-09-28 RX ORDER — FENTANYL CITRATE 50 UG/ML
INJECTION, SOLUTION INTRAMUSCULAR; INTRAVENOUS
Status: DISCONTINUED | OUTPATIENT
Start: 2020-09-28 | End: 2020-09-28 | Stop reason: HOSPADM

## 2020-09-28 RX ORDER — IPRATROPIUM BROMIDE AND ALBUTEROL SULFATE 2.5; .5 MG/3ML; MG/3ML
3 SOLUTION RESPIRATORY (INHALATION) EVERY 4 HOURS PRN
Status: DISCONTINUED | OUTPATIENT
Start: 2020-09-28 | End: 2020-09-28 | Stop reason: HOSPADM

## 2020-09-28 RX ORDER — CLOPIDOGREL 300 MG/1
TABLET, FILM COATED ORAL
Status: DISCONTINUED | OUTPATIENT
Start: 2020-09-28 | End: 2020-09-28 | Stop reason: HOSPADM

## 2020-09-28 RX ORDER — MIDAZOLAM HYDROCHLORIDE 1 MG/ML
INJECTION, SOLUTION INTRAMUSCULAR; INTRAVENOUS
Status: DISCONTINUED | OUTPATIENT
Start: 2020-09-28 | End: 2020-09-28 | Stop reason: HOSPADM

## 2020-09-28 RX ORDER — ONDANSETRON 8 MG/1
8 TABLET, ORALLY DISINTEGRATING ORAL EVERY 8 HOURS PRN
Status: DISCONTINUED | OUTPATIENT
Start: 2020-09-28 | End: 2020-09-28 | Stop reason: HOSPADM

## 2020-09-28 RX ORDER — NITROGLYCERIN 20 MG/100ML
INJECTION INTRAVENOUS
Status: DISCONTINUED | OUTPATIENT
Start: 2020-09-28 | End: 2020-09-28 | Stop reason: HOSPADM

## 2020-09-28 RX ORDER — SODIUM CHLORIDE 9 MG/ML
INJECTION, SOLUTION INTRAVENOUS CONTINUOUS
Status: ACTIVE | OUTPATIENT
Start: 2020-09-28 | End: 2020-09-28

## 2020-09-28 RX ADMIN — FOLIC ACID 1000 MCG: 1 TABLET ORAL at 09:09

## 2020-09-28 RX ADMIN — ASPIRIN 81 MG: 81 TABLET, CHEWABLE ORAL at 09:09

## 2020-09-28 RX ADMIN — ATORVASTATIN CALCIUM 20 MG: 10 TABLET, FILM COATED ORAL at 09:09

## 2020-09-28 RX ADMIN — SODIUM CHLORIDE: 0.9 INJECTION, SOLUTION INTRAVENOUS at 01:09

## 2020-09-28 RX ADMIN — DULOXETINE HYDROCHLORIDE 60 MG: 30 CAPSULE, DELAYED RELEASE ORAL at 09:09

## 2020-09-28 RX ADMIN — SODIUM CHLORIDE: 0.9 INJECTION, SOLUTION INTRAVENOUS at 12:09

## 2020-09-28 RX ADMIN — METHADONE HYDROCHLORIDE 20 MG: 10 TABLET ORAL at 09:09

## 2020-09-28 RX ADMIN — GABAPENTIN 600 MG: 300 CAPSULE ORAL at 09:09

## 2020-09-28 RX ADMIN — IPRATROPIUM BROMIDE AND ALBUTEROL SULFATE 3 ML: .5; 3 SOLUTION RESPIRATORY (INHALATION) at 04:09

## 2020-09-28 RX ADMIN — GABAPENTIN 600 MG: 300 CAPSULE ORAL at 03:09

## 2020-09-28 NOTE — NURSING
Released 2cc of air, no bleeding at site, good palpable pulse, good cap refill, will continue to monitor.

## 2020-09-28 NOTE — PLAN OF CARE
Andriy ALEXANDER x4, medication administered per MD orders, IV fluids maintained, monitored dressing for bleed from cath lab, patient is able to make needs known through out shift, patient safety maintained, bed in low locked position with call bell in reach, will continue to monitor.   Problem: Fall Injury Risk  Goal: Absence of Fall and Fall-Related Injury  Outcome: Ongoing, Progressing     Problem: Adult Inpatient Plan of Care  Goal: Plan of Care Review  Outcome: Ongoing, Progressing  Goal: Patient-Specific Goal (Individualization)  Outcome: Ongoing, Progressing  Goal: Absence of Hospital-Acquired Illness or Injury  Outcome: Ongoing, Progressing  Goal: Optimal Comfort and Wellbeing  Outcome: Ongoing, Progressing  Goal: Readiness for Transition of Care  Outcome: Ongoing, Progressing  Goal: Rounds/Family Conference  Outcome: Ongoing, Progressing

## 2020-09-28 NOTE — NURSING
Removed last 2cc of air, no bleeding at site, palpable pulse no numbness or tingling, will continue to monitor

## 2020-09-28 NOTE — DISCHARGE INSTRUCTIONS
Complete medications as ordered  Follow all discharge instructions.  Please schedule follow up appointments as necessary      When to Call Your Doctor  Call your doctor immediately if you have any of the following:  · Severe headache  · Severe dizziness, or fainting  · Nausea or vomiting  · Fast heartbeat (higher than 100 beats per minute)  · Fever or chills  · Swollen ankles  · Weakness  · Chest Pain attacks that last longer, occur more often, or are more severe than in the past       POST CATH    Take over-the-counter acetaminophen, as directed by your doctor to relieve any pain you experience at the tube insertion site. If over-the-counter pain-relief medications are inadequate, alert your doctor.    Resume taking any prescription or over-the-counter medications, excluding certain diabetes medications, as you normally would. Alert your doctor to all medications you take on a regular basis, and unless he directs you otherwise, it is safe to resume them after the catheterization.     Avoid immersing the puncture site, until you are completely healed. You may shower the day after the procedure, backwards to the site, Do not remove bandage, allow to fall off.    Abstain from pushing, pulling or lifting objects greater than 24 hours post-procedure.  Continue your regular activities, such as work and other physical activities, approximately one week after cardiac catheterization, unless your doctor advises a longer rest period.

## 2020-09-28 NOTE — PLAN OF CARE
09/28/20 1011   Discharge Assessment   Assessment Type Discharge Planning Assessment   Assessment information obtained from? Medical Record;Other   Prior to hospitilization cognitive status: Alert/Oriented   Prior to hospitalization functional status: Independent   Current cognitive status: Alert/Oriented   Current Functional Status: Independent   Facility Arrived From: home   Lives With parent(s)   Able to Return to Prior Arrangements yes   Is patient able to care for self after discharge? Yes   Who are your caregiver(s) and their phone number(s)? Marilou- [phone]   Patient's perception of discharge disposition home or selfcare   Readmission Within the Last 30 Days other (see comments)  (pt recently here with SOB secondary to pneumonia and not taking medications as prescribed)   Patient currently being followed by outpatient case management? No   Patient currently receives any other outside agency services? No   Equipment Currently Used at Home nebulizer   Do you have any problems affording any of your prescribed medications? No   Is the patient taking medications as prescribed? no  (history of non compliance with medication)   Does the patient have transportation home? Yes   Transportation Anticipated family or friend will provide   Does the patient receive services at the [facility]? No   Discharge Plan A Home with family  (with follow up)   DME Needed Upon Discharge    (TBD)   Patient/Family in Agreement with Plan yes     [id number] - [place] - [address]  [address] [place] [address]  Phone: [phone] Fax: [phone]    [facility] - [place] - [address]  [address]  [place] [address]  Phone: [phone] Fax: [phone]

## 2020-09-28 NOTE — DISCHARGE SUMMARY
[facility] Hospital Medicine  Discharge Summary      Patient Name: Cali Mabry  MRN: [id number]  Admission Date: 9/27/2020  Hospital Length of Stay: 0 days  Discharge Date and Time:  09/28/2020 6:41 PM  Attending Physician: Rosario Perez MD   Discharging Provider: KATH Turner  Primary Care Provider: Cullen Zimmer MD      HPI:   55 y.o. male with DM type 1, hypertension, anemia chronic disease, hyperlipidemia, IV drug abuse currently on methadone, chronic hep C, tobacco use, insomnia, and coronary artery disease presents with complaint of dyspnea.  Has been intermittent for the past few days, associated with chest pain.  Pain is located mid chest, and also intermittent, does not radiate, no known exacerbating or alleviating factors, no attempted self treatment.  Denies fever, chills, cough palpitations, orthopnea, PND, dizziness, syncope, nausea, vomiting, diarrhea, abdominal pain, bloody or black stools, or dysuria.  In the ED, found to have an elevated troponin of 0.164.  This is down trending from a recent presentation from which she left AMA.  EKG without evidence of acute ischemia.  Routine labs, chest x-ray unremarkable for acute abnormality.  Cardiology consulted and planned for left heart catheterization in the morning.  Placed in observation.    Procedure(s) (LRB):  Left heart cath (Left)  Percutaneous coronary intervention (N/A)      Hospital Course:   Placed in observation for evaluation of shortness of breath and unstable angina.  Evaluated by Cardiology who was familiar with the patient.  His serial troponins were elevated peak 0.189.  Cardiology evaluated the patient in left heart catheterization was performed.  Successful balloon angioplasty performed 60% stenosis Right coronary artery.  Patient very anxious for discharge.  Discussed with Cardiology who is okay with discharge once post cath protocol is complete.  Vital stable patient is I rate wants to go home he was  discharged home in stable condition with instructions to follow-up with cardiology in clinic.     Consults:   Consults (From admission, onward)        Status Ordering Provider     Inpatient consult to Cardiology  Once     Provider:  Fito Rangel MD    Completed CYNDEE CHING          No new Assessment & Plan notes have been filed under this hospital service since the last note was generated.  Service: Hospital Medicine    Final Active Diagnoses:    Diagnosis Date Noted POA    PRINCIPAL PROBLEM:  NSTEMI (non-ST elevated myocardial infarction) [I21.4] 09/28/2020 Yes    Elevated troponin [R79.89] 03/15/2019 Yes    SOB (shortness of breath) [R06.02] 09/28/2020 Yes    CAD (coronary artery disease) [I25.10] 08/13/2020 Yes     Chronic    Insomnia [G47.00] 04/21/2020 Yes     Chronic    Hyperlipidemia [E78.5] 11/23/2017 Yes     Chronic    Intravenous drug abuse [F19.10] 11/23/2017 Yes     Chronic    Chronic hepatitis C [B18.2] 11/23/2017 Yes     Chronic    Tobacco use disorder, moderate, dependence [F17.200] 11/23/2017 Yes     Chronic    Essential hypertension [I10] 04/03/2016 Yes     Chronic    Type 1 diabetes mellitus with hyperglycemia [E10.65] 04/03/2016 Yes     Chronic    Anemia of chronic disease [D63.8] 04/03/2016 Yes     Chronic      Problems Resolved During this Admission:       Discharged Condition: stable    Disposition: Home or Self Care    Follow Up:  Follow-up Information     Pito Mendoza MD On 10/29/2020.    Specialties: Pulmonary Disease, Sleep Medicine  Why: Appointment scheduled for October 29th at 3pm  Contact information:  [address] [facility] [address]  [address] [place] [id number]  [phone]                 Patient Instructions:      Diet Cardiac     Activity as tolerated       Significant Diagnostic Studies: Labs:   CMP   Recent Labs   Lab 09/27/20  0907      K 4.4   CL 99   CO2 31*   *   BUN 14   CREATININE 1.2   CALCIUM 9.4   PROT 7.9   ALBUMIN 3.5   BILITOT 0.4   ALKPHOS 86    AST 18   ALT 30   ANIONGAP 8   ESTGFRAFRICA >60   EGFRNONAA >60   , CBC   Recent Labs   Lab 09/27/20  0907 09/27/20  1319   WBC 6.73 5.48   HGB 12.2* 12.2*   HCT 40.0 38.8*    299   , INR   Lab Results   Component Value Date    INR 0.9 08/07/2020    INR 1.0 05/09/2020    INR 1.1 04/13/2019   , Lipid Panel   Lab Results   Component Value Date    CHOL 129 09/27/2020    HDL 41 09/27/2020    LDLCALC 71.6 09/27/2020    TRIG 82 09/27/2020    CHOLHDL 31.8 09/27/2020   , Troponin   Recent Labs   Lab 09/27/20  0907 09/27/20  1319 09/27/20  2129   TROPONINI 0.189* 0.164* 0.142*    and A1C:   Recent Labs   Lab 05/25/20 2051 09/27/20  1319   HGBA1C 6.9* 8.4*     Medications:  Reconciled Home Medications:      Medication List      START taking these medications    clopidogreL 75 mg tablet  Commonly known as: PLAVIX  Take 1 tablet (75 mg total) by mouth once daily.        CHANGE how you take these medications    aspirin 81 MG Chew  Take 1 tablet (81 mg total) by mouth once daily.  What changed: Another medication with the same name was removed. Continue taking this medication, and follow the directions you see here.     lidocaine 5 %  Commonly known as: LIDODERM  Place 1 patch onto the skin once daily. Remove & Discard patch within 12 hours or as directed by MD.  Apply to shoulder for pain  What changed: Another medication with the same name was removed. Continue taking this medication, and follow the directions you see here.     polyethylene glycol 17 gram Pwpk  Commonly known as: GLYCOLAX  Take 17 g by mouth daily as needed (Constipation).  What changed: when to take this        CONTINUE taking these medications    acetaminophen 650 MG Tbsr  Commonly known as: TYLENOL  Take 650 mg by mouth every 8 (eight) hours as needed.     albuterol 90 mcg/actuation inhaler  Commonly known as: PROVENTIL/VENTOLIN HFA  Inhale 1-2 puffs into the lungs every 6 (six) hours as needed for Wheezing or Shortness of Breath. Rescue      albuterol-ipratropium 2.5 mg-0.5 mg/3 mL nebulizer solution  Commonly known as: DUO-NEB  Take 3 mLs by nebulization every 4 (four) hours as needed for Wheezing or Shortness of Breath. Rescue     ASCORBIC ACID (VITAMIN C) ORAL  Take 1 tablet by mouth once daily.     atorvastatin 20 MG tablet  Commonly known as: LIPITOR  Take 20 mg by mouth once daily.     b complex vitamins tablet  Take 1 tablet by mouth once daily.     bisacodyL 10 mg Supp  Commonly known as: DULCOLAX  Place 10 mg rectally daily as needed.     blood sugar diagnostic Strp  Use To check blood glucose three times daily,     blood-glucose meter Misc  To check Blood glucose three times daily,     calcium carbonate 200 mg calcium (500 mg) chewable tablet  Commonly known as: TUMS  Take 1 tablet (500 mg total) by mouth daily as needed for Heartburn.     diphenhydrAMINE 25 mg capsule  Commonly known as: BENADRYL  Take 25 mg by mouth every 6 (six) hours as needed for Itching.     divalproex 125 MG EC tablet  Commonly known as: DEPAKOTE  Take 125 mg by mouth every 8 (eight) hours.     docusate sodium 100 MG capsule  Commonly known as: COLACE  Take 1 capsule (100 mg total) by mouth 2 (two) times daily as needed for Constipation.     DULoxetine 60 MG capsule  Commonly known as: CYMBALTA  Take 1 capsule (60 mg total) by mouth once daily.     folic acid-vit B6-vit B12 2.5-25-2 mg 2.5-25-2 mg Tab  Commonly known as: FOLBIC or Equiv  Take 1 tablet by mouth once daily.     gabapentin 300 MG capsule  Commonly known as: NEURONTIN  Take 2 capsules (600 mg total) by mouth 3 (three) times daily.     GLUCAGEN HYPOKIT 1 mg Solr  Generic drug: glucagon (human recombinant)  Inject 1 mg into the muscle as needed.     glucose 4 GM chewable tablet  Take 16 g by mouth as needed for Low blood sugar.     HALOPERIDOL LACTATE INJ  Inject 1 mg as directed every 4 (four) hours as needed.     hydrOXYzine HCL 25 MG tablet  Commonly known as: ATARAX  Take 1 tablet (25 mg total) by mouth  3 (three) times daily as needed for Anxiety.     insulin aspart U-100 100 unit/mL injection  Commonly known as: NOVOLOG  Inject 1-10 Units into the skin 3 (three) times daily with meals.     insulin lispro 100 unit/mL injection  Inject 15 Units into the skin 3 (three) times daily before meals.     insulin  unit/mL injection  Inject 45 Units into the skin every evening.     lancets Misc  To check BG 3 times daily, to use with insurance preferred meter     LANTUS U-100 INSULIN 100 unit/mL injection  Generic drug: insulin glargine  Inject 32 Units into the skin every evening.     melatonin 3 mg tablet  Commonly known as: MELATIN  Take 2 tablets (6 mg total) by mouth nightly as needed for Insomnia.     mineral oil enema  Commonly known as: FLEET OIL RETENTION  Place 1 enema rectally daily as needed for Constipation.     multivitamin per tablet  Commonly known as: THERAGRAN  Take 1 tablet by mouth once daily.     naloxone 4 mg/actuation Spry  Commonly known as: NARCAN  4mg by nasal route as needed for opioid overdose; may repeat every 2-3 minutes in alternating nostrils until medical help arrives. Call 911     nitroGLYCERIN 0.4 MG SL tablet  Commonly known as: NITROSTAT  Place 1 tablet (0.4 mg total) under the tongue every 5 (five) minutes as needed for Chest pain.     ondansetron 2 mg/mL injection  Commonly known as: ZOFRAN  Inject 4 mg into the vein every 6 (six) hours as needed.     QUEtiapine 50 MG tablet  Commonly known as: SEROQUEL  Take 50 mg by mouth every evening.     RISAQUAD ORAL  Take 1 capsule by mouth once daily.     traZODone 50 MG tablet  Commonly known as: DESYREL  Take 25 mg by mouth every evening.        STOP taking these medications    folic acid 800 MCG Tab  Commonly known as: FOLVITE            Indwelling Lines/Drains at time of discharge:   Lines/Drains/Airways     None                 Time spent on the discharge of patient: 35 minutes  Patient was seen and examined on the date of discharge  and determined to be suitable for discharge.       ARLENE Le, FNP-C  Hospitalist - Department of Hospital Medicine  [address] [place] [address]  Office [phone]; Pager [phone]

## 2020-09-28 NOTE — DISCHARGE SUMMARY
Patient status post left heart catheterization.  50% InStent restenosis of the proximal to mid right coronary artery.  Status post [place] balloon angioplasty.  Continue aspirin Plavix.  Aggressive risk factor modification.

## 2020-09-28 NOTE — PLAN OF CARE
Patient remains free from injury and falls. Remains NPO since midnight for cardiac cath. Patient clipped for procedure per order. IVF infusing. Plan of care continued.

## 2020-09-28 NOTE — NURSING
Released 2cc of air, no bleeding at site, palpable pulse, no numbness or tingling, great cap refill, will continue to monitor.

## 2020-09-28 NOTE — HOSPITAL COURSE
Placed in observation for evaluation of shortness of breath and unstable angina.  Evaluated by Cardiology who was familiar with the patient.  His serial troponins were elevated peak 0.189.  Cardiology evaluated the patient in left heart catheterization was performed.  Successful balloon angioplasty performed 60% stenosis Right coronary artery.  Patient very anxious for discharge.  Discussed with Cardiology who is okay with discharge once post cath protocol is complete.  Vital stable patient is I rate wants to go home he was discharged home in stable condition with instructions to follow-up with cardiology in clinic.

## 2020-09-28 NOTE — INTERVAL H&P NOTE
The patient has been examined and the H&P has been reviewed:    I concur with the findings and no changes have occurred since H&P was written.    Anesthesia/Surgery risks, benefits and alternative options discussed and understood by patient/family.          Active Hospital Problems    Diagnosis  POA    *Elevated troponin [R79.89]  Yes    CAD (coronary artery disease) [I25.10]  Yes     Chronic    Insomnia [G47.00]  Yes     Chronic    Hyperlipidemia [E78.5]  Yes     Chronic    Intravenous drug abuse [F19.10]  Yes     Chronic    Chronic hepatitis C [B18.2]  Yes     Chronic    Tobacco use disorder, moderate, dependence [F17.200]  Yes     Chronic    Essential hypertension [I10]  Yes     Chronic    Type 1 diabetes mellitus with hyperglycemia [E10.65]  Yes     Chronic    Anemia of chronic disease [D63.8]  Yes     Chronic      Resolved Hospital Problems   No resolved problems to display.

## 2020-09-28 NOTE — NURSING
Released 2cc of air, no bleeding at site, palpable pulse, no numbness or tingling, will continue to monitor

## 2020-09-28 NOTE — NURSING
Let out 2cc of air, +2 palpable pulse, good capillary refill, no bleeding at site, will continue to monitor.

## 2020-09-28 NOTE — PROGRESS NOTES
Notified NP: Lyubov Gorman of patient audible wheezing; nurse requesting breathing treatments.    Directive: NP to order treatments

## 2020-09-30 LAB — ETHYL GLUCURONIDE: NEGATIVE NG/ML

## 2020-10-13 ENCOUNTER — HOSPITAL ENCOUNTER (EMERGENCY)
Facility: HOSPITAL | Age: 55
Discharge: HOME | End: 2020-10-13
Attending: EMERGENCY MEDICINE
Payer: MEDICARE

## 2020-10-13 VITALS
HEIGHT: 68 IN | BODY MASS INDEX: 18.19 KG/M2 | SYSTOLIC BLOOD PRESSURE: 127 MMHG | TEMPERATURE: 98 F | OXYGEN SATURATION: 100 % | DIASTOLIC BLOOD PRESSURE: 71 MMHG | WEIGHT: 120 LBS | HEART RATE: 92 BPM | RESPIRATION RATE: 18 BRPM

## 2020-10-13 DIAGNOSIS — T50.901A ACCIDENTAL DRUG OVERDOSE, INITIAL ENCOUNTER: Primary | ICD-10-CM

## 2020-10-13 DIAGNOSIS — T50.901A OVERDOSE: ICD-10-CM

## 2020-10-13 LAB
ALBUMIN SERPL BCP-MCNC: 3.3 G/DL (ref 3.5–5.2)
ALP SERPL-CCNC: 74 U/L (ref 55–135)
ALT SERPL W/O P-5'-P-CCNC: 12 U/L (ref 10–44)
AMPHET+METHAMPHET UR QL: NEGATIVE
ANION GAP SERPL CALC-SCNC: 10 MMOL/L (ref 8–16)
APAP SERPL-MCNC: <3 UG/ML (ref 10–20)
AST SERPL-CCNC: 17 U/L (ref 10–40)
BARBITURATES UR QL SCN>200 NG/ML: NEGATIVE
BASOPHILS # BLD AUTO: 0.03 K/UL (ref 0–0.2)
BASOPHILS NFR BLD: 0.6 % (ref 0–1.9)
BENZODIAZ UR QL SCN>200 NG/ML: NORMAL
BILIRUB SERPL-MCNC: 0.3 MG/DL (ref 0.1–1)
BILIRUB UR QL STRIP: NEGATIVE
BNP SERPL-MCNC: 100 PG/ML (ref 0–99)
BUN SERPL-MCNC: 14 MG/DL (ref 6–20)
BZE UR QL SCN: NEGATIVE
CALCIUM SERPL-MCNC: 8.5 MG/DL (ref 8.7–10.5)
CANNABINOIDS UR QL SCN: NEGATIVE
CHLORIDE SERPL-SCNC: 103 MMOL/L (ref 95–110)
CLARITY UR: CLEAR
CO2 SERPL-SCNC: 25 MMOL/L (ref 23–29)
COLOR UR: ABNORMAL
CREAT SERPL-MCNC: 1.2 MG/DL (ref 0.5–1.4)
CREAT UR-MCNC: 37.9 MG/DL (ref 23–375)
DIFFERENTIAL METHOD: ABNORMAL
EOSINOPHIL # BLD AUTO: 0.7 K/UL (ref 0–0.5)
EOSINOPHIL NFR BLD: 13.6 % (ref 0–8)
ERYTHROCYTE [DISTWIDTH] IN BLOOD BY AUTOMATED COUNT: 14.1 % (ref 11.5–14.5)
EST. GFR  (AFRICAN AMERICAN): >60 ML/MIN/1.73 M^2
EST. GFR  (NON AFRICAN AMERICAN): >60 ML/MIN/1.73 M^2
ETHANOL SERPL-MCNC: <10 MG/DL
GLUCOSE SERPL-MCNC: 135 MG/DL (ref 70–110)
GLUCOSE UR QL STRIP: ABNORMAL
HCT VFR BLD AUTO: 37.1 % (ref 40–54)
HGB BLD-MCNC: 11.5 G/DL (ref 14–18)
HGB UR QL STRIP: NEGATIVE
IMM GRANULOCYTES # BLD AUTO: 0.01 K/UL (ref 0–0.04)
IMM GRANULOCYTES NFR BLD AUTO: 0.2 % (ref 0–0.5)
KETONES UR QL STRIP: NEGATIVE
LEUKOCYTE ESTERASE UR QL STRIP: NEGATIVE
LYMPHOCYTES # BLD AUTO: 1.3 K/UL (ref 1–4.8)
LYMPHOCYTES NFR BLD: 23.7 % (ref 18–48)
MCH RBC QN AUTO: 26.3 PG (ref 27–31)
MCHC RBC AUTO-ENTMCNC: 31 G/DL (ref 32–36)
MCV RBC AUTO: 85 FL (ref 82–98)
METHADONE UR QL SCN>300 NG/ML: NORMAL
MONOCYTES # BLD AUTO: 0.6 K/UL (ref 0.3–1)
MONOCYTES NFR BLD: 10.5 % (ref 4–15)
NEUTROPHILS # BLD AUTO: 2.8 K/UL (ref 1.8–7.7)
NEUTROPHILS NFR BLD: 51.4 % (ref 38–73)
NITRITE UR QL STRIP: NEGATIVE
NRBC BLD-RTO: 0 /100 WBC
OPIATES UR QL SCN: NEGATIVE
PCP UR QL SCN>25 NG/ML: NEGATIVE
PH UR STRIP: 7 [PH] (ref 5–8)
PLATELET # BLD AUTO: 246 K/UL (ref 150–350)
PMV BLD AUTO: 9.7 FL (ref 9.2–12.9)
POCT GLUCOSE: 140 MG/DL (ref 70–110)
POCT GLUCOSE: 42 MG/DL (ref 70–110)
POTASSIUM SERPL-SCNC: 4.3 MMOL/L (ref 3.5–5.1)
PROT SERPL-MCNC: 7.4 G/DL (ref 6–8.4)
PROT UR QL STRIP: NEGATIVE
RBC # BLD AUTO: 4.37 M/UL (ref 4.6–6.2)
SALICYLATES SERPL-MCNC: <5 MG/DL (ref 15–30)
SODIUM SERPL-SCNC: 138 MMOL/L (ref 136–145)
SP GR UR STRIP: 1.01 (ref 1–1.03)
TOXICOLOGY INFORMATION: NORMAL
TROPONIN I SERPL DL<=0.01 NG/ML-MCNC: 0.03 NG/ML (ref 0–0.03)
URN SPEC COLLECT METH UR: ABNORMAL
UROBILINOGEN UR STRIP-ACNC: NEGATIVE EU/DL
WBC # BLD AUTO: 5.45 K/UL (ref 3.9–12.7)

## 2020-10-13 PROCEDURE — 93010 EKG 12-LEAD: ICD-10-PCS | Mod: ,,, | Performed by: INTERNAL MEDICINE

## 2020-10-13 PROCEDURE — 99284 EMERGENCY DEPT VISIT MOD MDM: CPT | Mod: 25

## 2020-10-13 PROCEDURE — 93010 ELECTROCARDIOGRAM REPORT: CPT | Mod: ,,, | Performed by: INTERNAL MEDICINE

## 2020-10-13 PROCEDURE — 93005 ELECTROCARDIOGRAM TRACING: CPT

## 2020-10-13 PROCEDURE — 80307 DRUG TEST PRSMV CHEM ANLYZR: CPT

## 2020-10-13 PROCEDURE — 63600175 PHARM REV CODE 636 W HCPCS: Performed by: EMERGENCY MEDICINE

## 2020-10-13 PROCEDURE — 25000003 PHARM REV CODE 250

## 2020-10-13 PROCEDURE — 96361 HYDRATE IV INFUSION ADD-ON: CPT

## 2020-10-13 PROCEDURE — 85025 COMPLETE CBC W/AUTO DIFF WBC: CPT

## 2020-10-13 PROCEDURE — 80329 ANALGESICS NON-OPIOID 1 OR 2: CPT

## 2020-10-13 PROCEDURE — 80320 DRUG SCREEN QUANTALCOHOLS: CPT

## 2020-10-13 PROCEDURE — 81003 URINALYSIS AUTO W/O SCOPE: CPT | Mod: 59

## 2020-10-13 PROCEDURE — 25000003 PHARM REV CODE 250: Performed by: EMERGENCY MEDICINE

## 2020-10-13 PROCEDURE — 96374 THER/PROPH/DIAG INJ IV PUSH: CPT

## 2020-10-13 PROCEDURE — 96375 TX/PRO/DX INJ NEW DRUG ADDON: CPT

## 2020-10-13 PROCEDURE — 83880 ASSAY OF NATRIURETIC PEPTIDE: CPT

## 2020-10-13 PROCEDURE — 84484 ASSAY OF TROPONIN QUANT: CPT

## 2020-10-13 PROCEDURE — 80053 COMPREHEN METABOLIC PANEL: CPT

## 2020-10-13 RX ORDER — DEXTROSE 50 % IN WATER (D50W) INTRAVENOUS SYRINGE
25
Status: COMPLETED | OUTPATIENT
Start: 2020-10-13 | End: 2020-10-13

## 2020-10-13 RX ORDER — NALOXONE HCL 0.4 MG/ML
1.2 VIAL (ML) INJECTION
Status: COMPLETED | OUTPATIENT
Start: 2020-10-13 | End: 2020-10-13

## 2020-10-13 RX ORDER — NALOXONE HCL 0.4 MG/ML
2 VIAL (ML) INJECTION
Status: DISCONTINUED | OUTPATIENT
Start: 2020-10-13 | End: 2020-10-13

## 2020-10-13 RX ORDER — NALOXONE HYDROCHLORIDE 4 MG/.1ML
SPRAY NASAL
Qty: 1 EACH | Refills: 11 | Status: ON HOLD | OUTPATIENT
Start: 2020-10-13 | End: 2022-09-20

## 2020-10-13 RX ADMIN — DEXTROSE MONOHYDRATE 50 ML: 25 INJECTION, SOLUTION INTRAVENOUS at 04:10

## 2020-10-13 RX ADMIN — SODIUM CHLORIDE 1000 ML: 0.9 INJECTION, SOLUTION INTRAVENOUS at 04:10

## 2020-10-13 RX ADMIN — NALOXONE HYDROCHLORIDE 1.2 MG: 0.4 INJECTION, SOLUTION INTRAMUSCULAR; INTRAVENOUS; SUBCUTANEOUS at 04:10

## 2020-10-13 RX ADMIN — DEXTROSE 50 % IN WATER (D50W) INTRAVENOUS SYRINGE 50 ML: at 04:10

## 2020-10-13 NOTE — ED NOTES
Patient awake and orientated. States he wants to go home. MD at bedside explaining risks of leaving AMA. Pt agreed to risks and signed AMA form. Pt is going to wait until mother comes to pick him up.

## 2020-10-13 NOTE — ED NOTES
Marcella - mother   [phone]  Patient speaking to mother on the phones, states she will find someone to pick him.

## 2020-10-13 NOTE — ED PROVIDER NOTES
Encounter Date: 10/13/2020    SCRIBE #1 NOTE: I, Nael Borjas, am scribing for, and in the presence of,  Mat Sanchez MD. I have scribed the following portions of the note - Other sections scribed: HPI, PE, MDM.       History     Chief Complaint   Patient presents with    Drug Overdose     EMS reports heroin overdose. pt unresponsive and agonal upon arrival. 2 mg narcan administered with + response. pt uncooperative at this time.       Cali Mabry is an 55 y.o. male presenting to the Emergency Department due to sudden drug overdose of heroin. EMS reports patient was found unresponsive on a chair in a [employer] store on arrival and found a needle in the patient's vehicle. Patient was administered 2 milligrams of Narcan en route to Emergency Department. Patient is currently unresponsive. Patient has a past medical history of intravenous drug use, hepatitis C, and  congestive heart failure. Patient has a past surgical history of cardiac surgery. No known drug allergies. Unable to obtain an accurate history of systems due to patient being unresponsive.      The history is provided by the EMS personnel.     Review of patient's allergies indicates:  No Known Allergies  Past Medical History:   Diagnosis Date    Acute on chronic pancreatitis 4/3/2016    CHF (congestive heart failure)     DKA (diabetic ketoacidoses) 02/2020    DM (diabetes mellitus), type 1     HCV (hepatitis C virus)     high VL     HTN (hypertension)     Hypomagnesemia 5/25/2020    IVDU (intravenous drug user)     Heroin    Pancreatitis      Past Surgical History:   Procedure Laterality Date    CARDIAC SURGERY  1999    stent placed. ([facility])    ESOPHAGOGASTRODUODENOSCOPY N/A 3/5/2020    Gastritis.  No H pylori.  Completed PPI for 1 month.  Procedure: EGD (ESOPHAGOGASTRODUODENOSCOPY);  Surgeon: Brinda Rene MD;  [facility];  Service: Endoscopy;  Laterality: N/A;  W421 A; [phone]    LEFT HEART CATHETERIZATION Left  2020    Procedure: Left heart cath;  Surgeon: Fito Rangel MD;  Location: [facility] CATH LAB;  Service: Cardiology;  Laterality: Left;    SHOULDER SURGERY  2013    right shoulder, spur removal.     Family History   Problem Relation Age of Onset    Asthma Mother      Social History     Tobacco Use    Smoking status: Current Every Day Smoker     Packs/day: 1.00     Years: 20.00     Pack years: 20.00     Types: Cigarettes     Start date: 4/3/1981     Last attempt to quit: 2020     Years since quittin.3    Smokeless tobacco: Never Used    Tobacco comment: states approx 1 per day.   Substance Use Topics    Alcohol use: Not Currently     Alcohol/week: 0.0 standard drinks    Drug use: Yes     Types: Heroin, IV     Review of Systems   Unable to perform ROS: Patient unresponsive       Physical Exam     Initial Vitals   BP Pulse Resp Temp SpO2   10/13/20 1604 10/13/20 1604 10/13/20 1604 10/13/20 1932 10/13/20 1604   (!) 182/100 107 (!) 24 98.1 °F (36.7 °C) (!) 94 %      MAP       --                Physical Exam    Nursing note and vitals reviewed.  Constitutional: He appears well-developed and well-nourished. He is easily aroused.   Patient makes incomprehensible sounds and withdraws from pain.   HENT:   Head: Normocephalic.   Eyes:   Eyes move to physical stimuli.   Neck: No JVD present.   Cardiovascular: Tachycardia present.    Tachycardia heart rate of 110   Pulmonary/Chest:   Coarse rhonchi bilaterally   Abdominal: He exhibits no distension.   Musculoskeletal: No edema.   Neurological: He is easily aroused.   Arousable to physical sternal rub mostly incomprehensible sounds other than saying 1 clearly define curse word that starts with an F   Skin: Capillary refill takes less than 2 seconds. No rash noted.   Psychiatric: He is attentive.         ED Course   Critical Care    Date/Time: 10/13/2020 8:23 PM  Performed by: Mat Sanchez MD  Authorized by: Mat Sanchez MD   Direct patient  critical care time: 20 minutes  Additional history critical care time: 5 minutes  Ordering / reviewing critical care time: 10 minutes  Documentation critical care time: 8 minutes  Total critical care time (exclusive of procedural time) : 43 minutes  Critical care time was exclusive of separately billable procedures and treating other patients and teaching time.  Critical care was necessary to treat or prevent imminent or life-threatening deterioration of the following conditions: toxidrome.  Critical care was time spent personally by me on the following activities: development of treatment plan with patient or surrogate, discussions with consultants, interpretation of cardiac output measurements, evaluation of patient's response to treatment, examination of patient, ordering and performing treatments and interventions, ordering and review of laboratory studies, ordering and review of radiographic studies, pulse oximetry, re-evaluation of patient's condition, review of old charts and obtaining history from patient or surrogate.        Labs Reviewed   CBC W/ AUTO DIFFERENTIAL - Abnormal; Notable for the following components:       Result Value    RBC 4.37 (*)     Hemoglobin 11.5 (*)     Hematocrit 37.1 (*)     Mean Corpuscular Hemoglobin 26.3 (*)     Mean Corpuscular Hemoglobin Conc 31.0 (*)     Eos # 0.7 (*)     Eosinophil% 13.6 (*)     All other components within normal limits    Narrative:     Recoll. [id number] by [email or web address] at 10/13/2020 17:06, reason: Specimen   clotted & Specimen hemolyzed 10/13/2020  17:03 Notified   [facility]   COMPREHENSIVE METABOLIC PANEL - Abnormal; Notable for the following components:    Glucose 135 (*)     Calcium 8.5 (*)     Albumin 3.3 (*)     All other components within normal limits    Narrative:     Recoll. [id number] by [email or web address] at 10/13/2020 17:06, reason: Specimen   clotted & Specimen hemolyzed 10/13/2020  17:03 Notified   [facility]   URINALYSIS - Abnormal; Notable for the following  components:    Glucose, UA 2+ (*)     All other components within normal limits    Narrative:     Specimen Source->Urine   ACETAMINOPHEN LEVEL - Abnormal; Notable for the following components:    Acetaminophen (Tylenol), Serum <3.0 (*)     All other components within normal limits    Narrative:     Recoll. [id number] by [email or web address] at 10/13/2020 17:06, reason: Specimen   clotted & Specimen hemolyzed 10/13/2020  17:03 Notified   [facility]   SALICYLATE LEVEL - Abnormal; Notable for the following components:    Salicylate Lvl <5.0 (*)     All other components within normal limits    Narrative:     Recoll. [id number] by [email or web address] at 10/13/2020 17:06, reason: Specimen   clotted & Specimen hemolyzed 10/13/2020  17:03 Notified   [facility]   TROPONIN I - Abnormal; Notable for the following components:    Troponin I 0.033 (*)     All other components within normal limits    Narrative:     Recoll. [id number] by [email or web address] at 10/13/2020 17:06, reason: Specimen   clotted & Specimen hemolyzed 10/13/2020  17:03 Notified   [facility]   B-TYPE NATRIURETIC PEPTIDE - Abnormal; Notable for the following components:     (*)     All other components within normal limits    Narrative:     Recoll. [id number] by [email or web address] at 10/13/2020 17:06, reason: Specimen   clotted & Specimen hemolyzed 10/13/2020  17:03 Notified   [facility]   POCT GLUCOSE - Abnormal; Notable for the following components:    POCT Glucose 42 (*)     All other components within normal limits   POCT GLUCOSE - Abnormal; Notable for the following components:    POCT Glucose 140 (*)     All other components within normal limits   DRUG SCREEN PANEL, URINE EMERGENCY    Narrative:     Specimen Source->Urine   ALCOHOL,MEDICAL (ETHANOL)    Narrative:     Recoll. [id number] by [email or web address] at 10/13/2020 17:06, reason: Specimen   clotted & Specimen hemolyzed 10/13/2020  17:03 Notified   [facility]   POCT GLUCOSE MONITORING CONTINUOUS          Imaging Results          X-Ray Chest AP Portable (Final result)   Result time 10/13/20 16:44:28    Final result by Kin Murry MD (10/13/20 16:44:28)                 Impression:      No acute cardiopulmonary abnormality.      Electronically signed by: Kin Murry  Date:    10/13/2020  Time:    16:44             Narrative:    EXAMINATION:  XR CHEST AP PORTABLE    CLINICAL HISTORY:  SOB/aspiration after overdose;    TECHNIQUE:  Single frontal view of the chest was performed.    COMPARISON:  09/27/2020    FINDINGS:  Stable blunting of the right costophrenic angle, possible pleural thickening versus scar.  Mild interstitial coarsening without large airspace opacity or lobar consolidation.  Cardiomediastinal silhouette is stable.  No acute osseous abnormality.                                 Medical Decision Making:   Clinical Tests:   Lab Tests: Reviewed and Ordered  Radiological Study: Reviewed and Ordered  Medical Tests: Reviewed and Ordered      IV was established and Narcan was pushed.  Patient became alert.  Complaining of diffuse body pain but otherwise no acute symptoms.  Patient stating he does not want to be here and he wants to leave.  Patient appears to have capacity to understand risks of refusal.  Plan was further observation and further evaluation for acute cardiac event and/or aspiration.  Patient restated these concerns to me and signed out against medical advice.  We did wait for his mother to come pick him up.  He was able to get dressed and walk himself.  Patient denies suicidal ideation.  Patient was given a prescription for intranasal Narcan.          Scribe Attestation:   Scribe #1: I performed the above scribed service and the documentation accurately describes the services I performed. I attest to the accuracy of the note.                      Clinical Impression:       ICD-10-CM ICD-9-CM   1. Accidental drug overdose, initial encounter  T50.901A 977.9     E858.9   2. Overdose  T50.901A 977.9     E980.5                          ED Disposition  Condition    AMA                  I, Mat Sanchez, personally performed the services described in this documentation. All medical record entries made by the scribe were at my direction and in my presence. I have reviewed the chart and agree that the record reflects my personal performance and is accurate and complete.             Mat Sanchez MD  10/13/20 2024

## 2020-10-13 NOTE — ED TRIAGE NOTES
55 y.o male presents to the ED with chief complaint of overdose. EMS reports patient was found in [employer] [facility] sitting in a chair, police found a fresh needle in his car. EMS reports positive response to 2 mg of narcan and patient became combative and awake but then returned to an altered state. Pt is disorientated and unable to answer questions.

## 2020-10-14 NOTE — ED NOTES
Pt signed AMA paperwork. Family at bedside to transport pt home. VSS. No S/S of distress. No current C/O pain. VSS. Pt left AMA.

## 2020-10-14 NOTE — ED NOTES
Pt resting comfortably in bed no s/s of distress. Awaiting ride. Will continue to closely monitor.

## 2020-10-14 NOTE — ED NOTES
MD OK with PT leaving AMA but is wanting pts ride to show up first if possible. Pts mother en route per pt. IV D/C'd and pt given clean scrubs since clothes wet. Will continue to closely monitor. Pending pts mother arrival.

## 2020-11-20 ENCOUNTER — HOSPITAL ENCOUNTER (EMERGENCY)
Facility: HOSPITAL | Age: 55
Discharge: HOME | End: 2020-11-20
Attending: EMERGENCY MEDICINE
Payer: MEDICARE

## 2020-11-20 VITALS
HEART RATE: 91 BPM | BODY MASS INDEX: 19.7 KG/M2 | WEIGHT: 130 LBS | OXYGEN SATURATION: 95 % | SYSTOLIC BLOOD PRESSURE: 156 MMHG | TEMPERATURE: 99 F | RESPIRATION RATE: 18 BRPM | HEIGHT: 68 IN | DIASTOLIC BLOOD PRESSURE: 73 MMHG

## 2020-11-20 DIAGNOSIS — T50.901A ACCIDENTAL DRUG OVERDOSE, INITIAL ENCOUNTER: Primary | ICD-10-CM

## 2020-11-20 PROCEDURE — 99283 EMERGENCY DEPT VISIT LOW MDM: CPT

## 2020-11-20 RX ORDER — NALOXONE HYDROCHLORIDE 4 MG/.1ML
SPRAY NASAL
Qty: 1 EACH | Refills: 11 | Status: SHIPPED | OUTPATIENT
Start: 2020-11-20

## 2020-11-21 NOTE — ED PROVIDER NOTES
Encounter Date: 11/20/2020       History     Chief Complaint   Patient presents with    Ingestion     pt comes in via EMS from home with c/o unresponsive call to home. patient states this was due to increase in his methadone dosage today. denies any other drug use. patient did have postiive response to narcan per EMS     Patient's for evaluation altered mental status.  Patient's mother called EMS because he was difficult to arouse.  Patient states that his methadone dosage was increased today from 30 mg to 40 mg. he took the new dose this evening.  He also took gabapentin.  Patient states he fell asleep but does remember what happened.  He is on methadone for previous heroin addiction.  Patient complains of chronic left hip pain that is unchanged.  Denies back pain.  No acute trauma.  Denies chest pain or shortness of breath.  No nausea or vomiting.  No abdominal pain.  Headache.  No blurred vision.  Patient was well prior to taking the medication.  Patient was given Narcan by EMS with improvement.        Review of patient's allergies indicates:  No Known Allergies  Past Medical History:   Diagnosis Date    Acute on chronic pancreatitis 4/3/2016    CHF (congestive heart failure)     DKA (diabetic ketoacidoses) 02/2020    DM (diabetes mellitus), type 1     HCV (hepatitis C virus)     high VL     HTN (hypertension)     Hypomagnesemia 5/25/2020    IVDU (intravenous drug user)     Heroin    Pancreatitis      Past Surgical History:   Procedure Laterality Date    CARDIAC SURGERY  1999    stent placed. ([facility])    ESOPHAGOGASTRODUODENOSCOPY N/A 3/5/2020    Gastritis.  No H pylori.  Completed PPI for 1 month.  Procedure: EGD (ESOPHAGOGASTRODUODENOSCOPY);  Surgeon: Brinda Rene MD;  Location: [facility] END[facility];  Service: Endoscopy;  Laterality: N/A;  W421 A; [id number]    LEFT HEART CATHETERIZATION Left 9/28/2020    Procedure: Left heart cath;  Surgeon: Fito Rangel MD;  Location: Health system CATH LAB;   Service: Cardiology;  Laterality: Left;    SHOULDER SURGERY  2013    right shoulder, spur removal.     Family History   Problem Relation Age of Onset    Asthma Mother      Social History     Tobacco Use    Smoking status: Current Every Day Smoker     Packs/day: 1.00     Years: 20.00     Pack years: 20.00     Types: Cigarettes     Start date: 4/3/1981     Last attempt to quit: 2020     Years since quittin.4    Smokeless tobacco: Never Used    Tobacco comment: states approx 1 per day.   Substance Use Topics    Alcohol use: Not Currently     Alcohol/week: 0.0 standard drinks    Drug use: Yes     Types: Heroin, IV     Review of Systems   Constitutional: Negative for chills, diaphoresis and fever.   HENT: Negative for congestion.    Eyes: Negative.  Negative for visual disturbance.   Respiratory: Negative for cough and shortness of breath.    Cardiovascular: Negative for chest pain and palpitations.   Gastrointestinal: Negative for abdominal pain, blood in stool, diarrhea, nausea and vomiting.        NO melena or rectal bleeding   Genitourinary: Negative for dysuria.   Musculoskeletal: Negative for back pain.   Skin: Negative for wound.   Neurological: Positive for syncope. Negative for dizziness, seizures, facial asymmetry, speech difficulty, weakness, light-headedness, numbness and headaches.        No numbness   Psychiatric/Behavioral: Negative for confusion.   All other systems reviewed and are negative.      Physical Exam     Initial Vitals [20 1815]   BP Pulse Resp Temp SpO2   (!) 147/79 92 18 99.1 °F (37.3 °C) 97 %      MAP       --         Physical Exam    Nursing note and vitals reviewed.  Constitutional: He appears well-developed and well-nourished. He is not diaphoretic. No distress.   HENT:   Head: Normocephalic and atraumatic.   Nose: Nose normal.   Mouth/Throat: Oropharynx is clear and moist.   Eyes: Conjunctivae and EOM are normal. Pupils are equal, round, and reactive to light. Right  eye exhibits no discharge. Left eye exhibits no discharge. No scleral icterus.   Neck: Neck supple. No tracheal deviation present.   Cardiovascular: Normal rate, regular rhythm and normal heart sounds.   No murmur heard.  Pulmonary/Chest: Breath sounds normal. No stridor. No respiratory distress. He has no wheezes. He has no rhonchi. He has no rales.   Abdominal: Soft. He exhibits no distension. There is no abdominal tenderness. There is no rebound and no guarding.   Musculoskeletal: Normal range of motion. No edema.      Comments: Tender to palpation over left lateral hip.  Mild pain with range of motion left hip.  No deformity.  Normal left knee and ankle.  No tenderness over lumbar or thoracic spine.  Normal right lower extremity exam.  Normal bilateral upper extremity exams.   Neurological: He is alert and oriented to person, place, and time. He has normal strength.   Skin: Skin is warm and dry. No rash noted.   Multiple scars over the veins of his upper extremities from previous IV drug use.  No evidence of infection.   Psychiatric: He has a normal mood and affect. His behavior is normal. Judgment and thought content normal.   Euphoric.  Slightly slurred speech.         ED Course   Procedures  Labs Reviewed - No data to display       Imaging Results    None          Medical Decision Making:   Initial Assessment:   Patient presents for evaluation of accidental opiate overdose.  Patient is now alert and oriented.  Vital signs are normal.  Will observe for recurrence of symptoms.  Differential Diagnosis:   Opiate overdose  ED Management:  1945:  Patient remains alert and oriented.  Patient is ambulatory.  Patient stable for discharge.  Patient instructed to return to his previous dose of methadone.  May follow up with primary care.                             Clinical Impression:     ICD-10-CM ICD-9-CM   1. Accidental drug overdose, initial encounter  T50.901A 977.9     E858.9                      Disposition:    Disposition: Discharged  Condition: Stable     ED Disposition Condition    Discharge Stable        ED Prescriptions     None        Follow-up Information    None                                      Jeff Shepherd MD  11/20/20 2157

## 2020-11-21 NOTE — ED TRIAGE NOTES
Pt presents to ED d/t OD of Methadone. Pt stated facility increased his dose. Reports taking the medication around 10:30 AM, going home to eat then being awaken by EMS's administration of Narcan. Denies having any pain, N/V/D, fever or chills. NAD noted. VSS. Will continue to monitor. Pt refuses to put on hospital gown.

## 2020-11-22 ENCOUNTER — NURSE TRIAGE (OUTPATIENT)
Dept: ADMINISTRATIVE | Facility: CLINIC | Age: 55
End: 2020-11-22

## 2020-11-22 NOTE — TELEPHONE ENCOUNTER
Pt's sister calling regarding rescheduling COVID test. Sister also reports pt is acting confused and disoriented. Per protocol, advised sister to call 911. Sister refuses disposition. Advised again that for the pt's safety she should call 911. Still refuses.     Reason for Disposition   [1] Difficult to awaken or acting confused (e.g., disoriented, slurred speech) AND [2] present now AND [3] has diabetes (diabetes mellitus)    Protocols used: CONFUSION - DELIRIUM-A-AH

## 2020-11-23 ENCOUNTER — CLINICAL SUPPORT (OUTPATIENT)
Dept: URGENT CARE | Facility: CLINIC | Age: 55
End: 2020-11-23
Payer: MEDICARE

## 2021-02-06 ENCOUNTER — HOSPITAL ENCOUNTER (EMERGENCY)
Facility: HOSPITAL | Age: 56
Discharge: HOME | End: 2021-02-07
Attending: EMERGENCY MEDICINE
Payer: MEDICARE

## 2021-02-06 DIAGNOSIS — R25.2 SPASMS OF THE HANDS OR FEET: ICD-10-CM

## 2021-02-06 DIAGNOSIS — E16.2 HYPOGLYCEMIA: Primary | ICD-10-CM

## 2021-02-06 DIAGNOSIS — F15.90 AMPHETAMINE USER: ICD-10-CM

## 2021-02-06 LAB
ALBUMIN SERPL BCP-MCNC: 3.8 G/DL (ref 3.5–5.2)
ALP SERPL-CCNC: 96 U/L (ref 55–135)
ALT SERPL W/O P-5'-P-CCNC: 13 U/L (ref 10–44)
AMPHET+METHAMPHET UR QL: ABNORMAL
ANION GAP SERPL CALC-SCNC: 16 MMOL/L (ref 8–16)
APAP SERPL-MCNC: <3 UG/ML (ref 10–20)
AST SERPL-CCNC: 18 U/L (ref 10–40)
BARBITURATES UR QL SCN>200 NG/ML: NEGATIVE
BASOPHILS # BLD AUTO: 0.04 K/UL (ref 0–0.2)
BASOPHILS NFR BLD: 0.5 % (ref 0–1.9)
BENZODIAZ UR QL SCN>200 NG/ML: NEGATIVE
BILIRUB SERPL-MCNC: 0.5 MG/DL (ref 0.1–1)
BILIRUB UR QL STRIP: NEGATIVE
BUN SERPL-MCNC: 15 MG/DL (ref 6–20)
BZE UR QL SCN: NEGATIVE
CALCIUM SERPL-MCNC: 9.6 MG/DL (ref 8.7–10.5)
CANNABINOIDS UR QL SCN: NEGATIVE
CHLORIDE SERPL-SCNC: 98 MMOL/L (ref 95–110)
CK SERPL-CCNC: 81 U/L (ref 20–200)
CLARITY UR: CLEAR
CO2 SERPL-SCNC: 22 MMOL/L (ref 23–29)
COLOR UR: COLORLESS
CREAT SERPL-MCNC: 1.3 MG/DL (ref 0.5–1.4)
CREAT UR-MCNC: 21.3 MG/DL (ref 23–375)
CTP QC/QA: YES
DIFFERENTIAL METHOD: ABNORMAL
EOSINOPHIL # BLD AUTO: 0.3 K/UL (ref 0–0.5)
EOSINOPHIL NFR BLD: 3.7 % (ref 0–8)
ERYTHROCYTE [DISTWIDTH] IN BLOOD BY AUTOMATED COUNT: 13.8 % (ref 11.5–14.5)
EST. GFR  (AFRICAN AMERICAN): >60 ML/MIN/1.73 M^2
EST. GFR  (NON AFRICAN AMERICAN): >60 ML/MIN/1.73 M^2
ETHANOL SERPL-MCNC: <10 MG/DL
GLUCOSE SERPL-MCNC: 97 MG/DL (ref 70–110)
GLUCOSE UR QL STRIP: NEGATIVE
HCT VFR BLD AUTO: 35 % (ref 40–54)
HGB BLD-MCNC: 10.9 G/DL (ref 14–18)
HGB UR QL STRIP: NEGATIVE
IMM GRANULOCYTES # BLD AUTO: 0.02 K/UL (ref 0–0.04)
IMM GRANULOCYTES NFR BLD AUTO: 0.2 % (ref 0–0.5)
KETONES UR QL STRIP: NEGATIVE
LEUKOCYTE ESTERASE UR QL STRIP: NEGATIVE
LYMPHOCYTES # BLD AUTO: 1.6 K/UL (ref 1–4.8)
LYMPHOCYTES NFR BLD: 20.2 % (ref 18–48)
MAGNESIUM SERPL-MCNC: 2 MG/DL (ref 1.6–2.6)
MCH RBC QN AUTO: 25.6 PG (ref 27–31)
MCHC RBC AUTO-ENTMCNC: 31.1 G/DL (ref 32–36)
MCV RBC AUTO: 82 FL (ref 82–98)
METHADONE UR QL SCN>300 NG/ML: ABNORMAL
MONOCYTES # BLD AUTO: 1.1 K/UL (ref 0.3–1)
MONOCYTES NFR BLD: 13.5 % (ref 4–15)
NEUTROPHILS # BLD AUTO: 5 K/UL (ref 1.8–7.7)
NEUTROPHILS NFR BLD: 61.9 % (ref 38–73)
NITRITE UR QL STRIP: NEGATIVE
NRBC BLD-RTO: 0 /100 WBC
OPIATES UR QL SCN: NEGATIVE
PCP UR QL SCN>25 NG/ML: NEGATIVE
PH UR STRIP: 6 [PH] (ref 5–8)
PLATELET # BLD AUTO: 382 K/UL (ref 150–350)
PMV BLD AUTO: 9.5 FL (ref 9.2–12.9)
POCT GLUCOSE: 40 MG/DL (ref 70–110)
POCT GLUCOSE: 86 MG/DL (ref 70–110)
POCT GLUCOSE: 97 MG/DL (ref 70–110)
POTASSIUM SERPL-SCNC: 4.9 MMOL/L (ref 3.5–5.1)
PROT SERPL-MCNC: 8.4 G/DL (ref 6–8.4)
PROT UR QL STRIP: NEGATIVE
RBC # BLD AUTO: 4.26 M/UL (ref 4.6–6.2)
SALICYLATES SERPL-MCNC: <5 MG/DL (ref 15–30)
SARS-COV-2 RDRP RESP QL NAA+PROBE: NEGATIVE
SODIUM SERPL-SCNC: 136 MMOL/L (ref 136–145)
SP GR UR STRIP: 1 (ref 1–1.03)
TOXICOLOGY INFORMATION: ABNORMAL
TSH SERPL DL<=0.005 MIU/L-ACNC: 0.58 UIU/ML (ref 0.4–4)
URN SPEC COLLECT METH UR: ABNORMAL
UROBILINOGEN UR STRIP-ACNC: NEGATIVE EU/DL
WBC # BLD AUTO: 8.07 K/UL (ref 3.9–12.7)

## 2021-02-06 PROCEDURE — 80053 COMPREHEN METABOLIC PANEL: CPT

## 2021-02-06 PROCEDURE — 96375 TX/PRO/DX INJ NEW DRUG ADDON: CPT

## 2021-02-06 PROCEDURE — 63600175 PHARM REV CODE 636 W HCPCS: Performed by: EMERGENCY MEDICINE

## 2021-02-06 PROCEDURE — 96374 THER/PROPH/DIAG INJ IV PUSH: CPT

## 2021-02-06 PROCEDURE — 82962 GLUCOSE BLOOD TEST: CPT | Mod: 91

## 2021-02-06 PROCEDURE — 84443 ASSAY THYROID STIM HORMONE: CPT

## 2021-02-06 PROCEDURE — 93010 EKG 12-LEAD: ICD-10-PCS | Mod: ,,, | Performed by: INTERNAL MEDICINE

## 2021-02-06 PROCEDURE — 80179 DRUG ASSAY SALICYLATE: CPT

## 2021-02-06 PROCEDURE — 83735 ASSAY OF MAGNESIUM: CPT

## 2021-02-06 PROCEDURE — 99284 EMERGENCY DEPT VISIT MOD MDM: CPT | Mod: 25

## 2021-02-06 PROCEDURE — 80307 DRUG TEST PRSMV CHEM ANLYZR: CPT

## 2021-02-06 PROCEDURE — 25000003 PHARM REV CODE 250: Performed by: EMERGENCY MEDICINE

## 2021-02-06 PROCEDURE — 85025 COMPLETE CBC W/AUTO DIFF WBC: CPT

## 2021-02-06 PROCEDURE — 93005 ELECTROCARDIOGRAM TRACING: CPT

## 2021-02-06 PROCEDURE — 93010 ELECTROCARDIOGRAM REPORT: CPT | Mod: ,,, | Performed by: INTERNAL MEDICINE

## 2021-02-06 PROCEDURE — 82077 ASSAY SPEC XCP UR&BREATH IA: CPT

## 2021-02-06 PROCEDURE — 96361 HYDRATE IV INFUSION ADD-ON: CPT

## 2021-02-06 PROCEDURE — 80143 DRUG ASSAY ACETAMINOPHEN: CPT

## 2021-02-06 PROCEDURE — 82550 ASSAY OF CK (CPK): CPT

## 2021-02-06 PROCEDURE — 81003 URINALYSIS AUTO W/O SCOPE: CPT

## 2021-02-06 PROCEDURE — U0002 COVID-19 LAB TEST NON-CDC: HCPCS | Performed by: EMERGENCY MEDICINE

## 2021-02-06 RX ORDER — DEXTROSE 50 % IN WATER (D50W) INTRAVENOUS SYRINGE
25
Status: COMPLETED | OUTPATIENT
Start: 2021-02-06 | End: 2021-02-06

## 2021-02-06 RX ORDER — HALOPERIDOL 5 MG/ML
2.5 INJECTION INTRAMUSCULAR
Status: COMPLETED | OUTPATIENT
Start: 2021-02-06 | End: 2021-02-06

## 2021-02-06 RX ADMIN — HALOPERIDOL LACTATE 2.5 MG: 5 INJECTION, SOLUTION INTRAMUSCULAR at 07:02

## 2021-02-06 RX ADMIN — SODIUM CHLORIDE, SODIUM LACTATE, POTASSIUM CHLORIDE, AND CALCIUM CHLORIDE 1000 ML: .6; .31; .03; .02 INJECTION, SOLUTION INTRAVENOUS at 07:02

## 2021-02-06 RX ADMIN — DEXTROSE MONOHYDRATE 25 G: 25 INJECTION, SOLUTION INTRAVENOUS at 08:02

## 2021-02-07 VITALS
DIASTOLIC BLOOD PRESSURE: 72 MMHG | RESPIRATION RATE: 16 BRPM | HEIGHT: 68 IN | TEMPERATURE: 99 F | SYSTOLIC BLOOD PRESSURE: 127 MMHG | HEART RATE: 87 BPM | WEIGHT: 130 LBS | BODY MASS INDEX: 19.7 KG/M2 | OXYGEN SATURATION: 96 %

## 2021-02-07 LAB — POCT GLUCOSE: 101 MG/DL (ref 70–110)

## 2021-03-18 ENCOUNTER — IMMUNIZATION (OUTPATIENT)
Dept: OBSTETRICS AND GYNECOLOGY | Facility: CLINIC | Age: 56
End: 2021-03-18
Payer: MEDICARE

## 2021-03-18 DIAGNOSIS — Z23 NEED FOR VACCINATION: Primary | ICD-10-CM

## 2021-03-18 PROCEDURE — 91300 COVID-19, MRNA, LNP-S, PF, 30 MCG/0.3 ML DOSE VACCINE: CPT | Mod: PBBFAC | Performed by: NURSE PRACTITIONER

## 2021-04-08 ENCOUNTER — IMMUNIZATION (OUTPATIENT)
Dept: OBSTETRICS AND GYNECOLOGY | Facility: CLINIC | Age: 56
End: 2021-04-08

## 2021-04-08 DIAGNOSIS — Z23 NEED FOR VACCINATION: Primary | ICD-10-CM

## 2021-04-08 PROCEDURE — 91300 COVID-19, MRNA, LNP-S, PF, 30 MCG/0.3 ML DOSE VACCINE: ICD-10-PCS | Mod: S$GLB,,, | Performed by: FAMILY MEDICINE

## 2021-04-08 PROCEDURE — 0002A COVID-19, MRNA, LNP-S, PF, 30 MCG/0.3 ML DOSE VACCINE: CPT | Mod: CV19,S$GLB,, | Performed by: FAMILY MEDICINE

## 2021-04-08 PROCEDURE — 91300 COVID-19, MRNA, LNP-S, PF, 30 MCG/0.3 ML DOSE VACCINE: CPT | Mod: S$GLB,,, | Performed by: FAMILY MEDICINE

## 2021-04-08 PROCEDURE — 0002A COVID-19, MRNA, LNP-S, PF, 30 MCG/0.3 ML DOSE VACCINE: ICD-10-PCS | Mod: CV19,S$GLB,, | Performed by: FAMILY MEDICINE

## 2021-05-02 ENCOUNTER — HOSPITAL ENCOUNTER (INPATIENT)
Facility: HOSPITAL | Age: 56
LOS: 1 days | Discharge: HOME | DRG: 638 | End: 2021-05-03
Attending: EMERGENCY MEDICINE | Admitting: HOSPITALIST
Payer: MEDICARE

## 2021-05-02 DIAGNOSIS — R73.9 HYPERGLYCEMIA: ICD-10-CM

## 2021-05-02 DIAGNOSIS — N17.9 ACUTE KIDNEY INJURY: ICD-10-CM

## 2021-05-02 DIAGNOSIS — E10.10 TYPE 1 DIABETES MELLITUS WITH KETOACIDOSIS WITHOUT COMA: Primary | ICD-10-CM

## 2021-05-02 DIAGNOSIS — R10.9 ABDOMINAL PAIN, UNSPECIFIED ABDOMINAL LOCATION: ICD-10-CM

## 2021-05-02 PROBLEM — R07.9 CHEST PAIN: Status: RESOLVED | Noted: 2020-05-09 | Resolved: 2021-05-02

## 2021-05-02 PROBLEM — R45.1 AGITATION: Status: RESOLVED | Noted: 2020-04-30 | Resolved: 2021-05-02

## 2021-05-02 LAB
ALBUMIN SERPL BCP-MCNC: 4.2 G/DL (ref 3.5–5.2)
ALLENS TEST: ABNORMAL
ALP SERPL-CCNC: 126 U/L (ref 55–135)
ALT SERPL W/O P-5'-P-CCNC: 14 U/L (ref 10–44)
AMPHET+METHAMPHET UR QL: NEGATIVE
ANION GAP SERPL CALC-SCNC: 15 MMOL/L (ref 8–16)
ANION GAP SERPL CALC-SCNC: 15 MMOL/L (ref 8–16)
ANION GAP SERPL CALC-SCNC: 24 MMOL/L (ref 8–16)
ANION GAP SERPL CALC-SCNC: 28 MMOL/L (ref 8–16)
AST SERPL-CCNC: 15 U/L (ref 10–40)
B-OH-BUTYR BLD STRIP-SCNC: 0.8 MMOL/L (ref 0–0.5)
B-OH-BUTYR BLD STRIP-SCNC: 5.2 MMOL/L (ref 0–0.5)
BARBITURATES UR QL SCN>200 NG/ML: NEGATIVE
BASOPHILS # BLD AUTO: 0.05 K/UL (ref 0–0.2)
BASOPHILS NFR BLD: 0.5 % (ref 0–1.9)
BENZODIAZ UR QL SCN>200 NG/ML: NEGATIVE
BILIRUB SERPL-MCNC: 0.5 MG/DL (ref 0.1–1)
BUN SERPL-MCNC: 22 MG/DL (ref 6–20)
BUN SERPL-MCNC: 23 MG/DL (ref 6–20)
BUN SERPL-MCNC: 26 MG/DL (ref 6–20)
BUN SERPL-MCNC: 27 MG/DL (ref 6–20)
BZE UR QL SCN: NEGATIVE
CALCIUM SERPL-MCNC: 8.2 MG/DL (ref 8.7–10.5)
CALCIUM SERPL-MCNC: 8.4 MG/DL (ref 8.7–10.5)
CALCIUM SERPL-MCNC: 8.4 MG/DL (ref 8.7–10.5)
CALCIUM SERPL-MCNC: 9.3 MG/DL (ref 8.7–10.5)
CANNABINOIDS UR QL SCN: NEGATIVE
CHLORIDE SERPL-SCNC: 106 MMOL/L (ref 95–110)
CHLORIDE SERPL-SCNC: 110 MMOL/L (ref 95–110)
CHLORIDE SERPL-SCNC: 112 MMOL/L (ref 95–110)
CHLORIDE SERPL-SCNC: 98 MMOL/L (ref 95–110)
CO2 SERPL-SCNC: 11 MMOL/L (ref 23–29)
CO2 SERPL-SCNC: 11 MMOL/L (ref 23–29)
CO2 SERPL-SCNC: 6 MMOL/L (ref 23–29)
CO2 SERPL-SCNC: 6 MMOL/L (ref 23–29)
CREAT SERPL-MCNC: 1.4 MG/DL (ref 0.5–1.4)
CREAT SERPL-MCNC: 1.5 MG/DL (ref 0.5–1.4)
CREAT SERPL-MCNC: 1.8 MG/DL (ref 0.5–1.4)
CREAT SERPL-MCNC: 2.2 MG/DL (ref 0.5–1.4)
CREAT UR-MCNC: 28.9 MG/DL (ref 23–375)
CTP QC/QA: YES
DELSYS: ABNORMAL
DIFFERENTIAL METHOD: ABNORMAL
EOSINOPHIL # BLD AUTO: 0 K/UL (ref 0–0.5)
EOSINOPHIL NFR BLD: 0.2 % (ref 0–8)
ERYTHROCYTE [DISTWIDTH] IN BLOOD BY AUTOMATED COUNT: 15.3 % (ref 11.5–14.5)
EST. GFR  (AFRICAN AMERICAN): 38 ML/MIN/1.73 M^2
EST. GFR  (AFRICAN AMERICAN): 48 ML/MIN/1.73 M^2
EST. GFR  (AFRICAN AMERICAN): >60 ML/MIN/1.73 M^2
EST. GFR  (AFRICAN AMERICAN): >60 ML/MIN/1.73 M^2
EST. GFR  (NON AFRICAN AMERICAN): 33 ML/MIN/1.73 M^2
EST. GFR  (NON AFRICAN AMERICAN): 42 ML/MIN/1.73 M^2
EST. GFR  (NON AFRICAN AMERICAN): 52 ML/MIN/1.73 M^2
EST. GFR  (NON AFRICAN AMERICAN): 57 ML/MIN/1.73 M^2
ESTIMATED AVG GLUCOSE: 246 MG/DL (ref 68–131)
ETHANOL SERPL-MCNC: <10 MG/DL
FIO2: 21
GLUCOSE SERPL-MCNC: 157 MG/DL (ref 70–110)
GLUCOSE SERPL-MCNC: 275 MG/DL (ref 70–110)
GLUCOSE SERPL-MCNC: 465 MG/DL (ref 70–110)
GLUCOSE SERPL-MCNC: 649 MG/DL (ref 70–110)
GLUCOSE SERPL-MCNC: >100 MG/DL (ref 70–110)
HBA1C MFR BLD: 10.2 % (ref 4–5.6)
HCO3 UR-SCNC: 6 MMOL/L (ref 24–28)
HCT VFR BLD AUTO: 47.9 % (ref 40–54)
HGB BLD-MCNC: 14.7 G/DL (ref 14–18)
IMM GRANULOCYTES # BLD AUTO: 0.08 K/UL (ref 0–0.04)
IMM GRANULOCYTES NFR BLD AUTO: 0.7 % (ref 0–0.5)
LACTATE SERPL-SCNC: 3.2 MMOL/L (ref 0.5–2.2)
LIPASE SERPL-CCNC: 24 U/L (ref 4–60)
LYMPHOCYTES # BLD AUTO: 1 K/UL (ref 1–4.8)
LYMPHOCYTES NFR BLD: 9.4 % (ref 18–48)
MCH RBC QN AUTO: 26.4 PG (ref 27–31)
MCHC RBC AUTO-ENTMCNC: 30.7 G/DL (ref 32–36)
MCV RBC AUTO: 86 FL (ref 82–98)
METHADONE UR QL SCN>300 NG/ML: NORMAL
MODE: ABNORMAL
MONOCYTES # BLD AUTO: 0.5 K/UL (ref 0.3–1)
MONOCYTES NFR BLD: 4.6 % (ref 4–15)
NEUTROPHILS # BLD AUTO: 9.3 K/UL (ref 1.8–7.7)
NEUTROPHILS NFR BLD: 84.6 % (ref 38–73)
NRBC BLD-RTO: 0 /100 WBC
OPIATES UR QL SCN: NEGATIVE
OSMOLALITY SERPL: 344 MOSM/KG (ref 280–300)
PCO2 BLDA: 22.3 MMHG (ref 35–45)
PCP UR QL SCN>25 NG/ML: NEGATIVE
PH SMN: 7.03 [PH] (ref 7.35–7.45)
PLATELET # BLD AUTO: 426 K/UL (ref 150–450)
PMV BLD AUTO: 9.7 FL (ref 9.2–12.9)
PO2 BLDA: 47 MMHG (ref 40–60)
POC BE: -23 MMOL/L
POC SATURATED O2: 65 % (ref 95–100)
POC TCO2: 7 MMOL/L (ref 24–29)
POCT GLUCOSE: 160 MG/DL (ref 70–110)
POCT GLUCOSE: 161 MG/DL (ref 70–110)
POCT GLUCOSE: 194 MG/DL (ref 70–110)
POCT GLUCOSE: 200 MG/DL (ref 70–110)
POCT GLUCOSE: 205 MG/DL (ref 70–110)
POCT GLUCOSE: 212 MG/DL (ref 70–110)
POCT GLUCOSE: 216 MG/DL (ref 70–110)
POCT GLUCOSE: 217 MG/DL (ref 70–110)
POCT GLUCOSE: 236 MG/DL (ref 70–110)
POCT GLUCOSE: 289 MG/DL (ref 70–110)
POCT GLUCOSE: 298 MG/DL (ref 70–110)
POCT GLUCOSE: 305 MG/DL (ref 70–110)
POCT GLUCOSE: 375 MG/DL (ref 70–110)
POCT GLUCOSE: 404 MG/DL (ref 70–110)
POCT GLUCOSE: 408 MG/DL (ref 70–110)
POCT GLUCOSE: 416 MG/DL (ref 70–110)
POTASSIUM SERPL-SCNC: 4.9 MMOL/L (ref 3.5–5.1)
POTASSIUM SERPL-SCNC: 5 MMOL/L (ref 3.5–5.1)
POTASSIUM SERPL-SCNC: 5.2 MMOL/L (ref 3.5–5.1)
POTASSIUM SERPL-SCNC: 6 MMOL/L (ref 3.5–5.1)
PROT SERPL-MCNC: 9.2 G/DL (ref 6–8.4)
RBC # BLD AUTO: 5.57 M/UL (ref 4.6–6.2)
SAMPLE: ABNORMAL
SARS-COV-2 RDRP RESP QL NAA+PROBE: NEGATIVE
SITE: ABNORMAL
SODIUM SERPL-SCNC: 132 MMOL/L (ref 136–145)
SODIUM SERPL-SCNC: 136 MMOL/L (ref 136–145)
SODIUM SERPL-SCNC: 136 MMOL/L (ref 136–145)
SODIUM SERPL-SCNC: 138 MMOL/L (ref 136–145)
TOXICOLOGY INFORMATION: NORMAL
WBC # BLD AUTO: 10.93 K/UL (ref 3.9–12.7)

## 2021-05-02 PROCEDURE — 80048 BASIC METABOLIC PNL TOTAL CA: CPT | Mod: 91 | Performed by: HOSPITALIST

## 2021-05-02 PROCEDURE — 82077 ASSAY SPEC XCP UR&BREATH IA: CPT | Performed by: EMERGENCY MEDICINE

## 2021-05-02 PROCEDURE — 82803 BLOOD GASES ANY COMBINATION: CPT

## 2021-05-02 PROCEDURE — S5010 5% DEXTROSE AND 0.45% SALINE: HCPCS | Performed by: HOSPITALIST

## 2021-05-02 PROCEDURE — 96361 HYDRATE IV INFUSION ADD-ON: CPT

## 2021-05-02 PROCEDURE — 99900035 HC TECH TIME PER 15 MIN (STAT)

## 2021-05-02 PROCEDURE — 63600175 PHARM REV CODE 636 W HCPCS: Performed by: HOSPITALIST

## 2021-05-02 PROCEDURE — U0002 COVID-19 LAB TEST NON-CDC: HCPCS | Performed by: EMERGENCY MEDICINE

## 2021-05-02 PROCEDURE — 93010 ELECTROCARDIOGRAM REPORT: CPT | Mod: ,,, | Performed by: INTERNAL MEDICINE

## 2021-05-02 PROCEDURE — 36415 COLL VENOUS BLD VENIPUNCTURE: CPT | Performed by: HOSPITALIST

## 2021-05-02 PROCEDURE — 25000003 PHARM REV CODE 250: Performed by: EMERGENCY MEDICINE

## 2021-05-02 PROCEDURE — 80053 COMPREHEN METABOLIC PANEL: CPT | Performed by: EMERGENCY MEDICINE

## 2021-05-02 PROCEDURE — 83690 ASSAY OF LIPASE: CPT | Performed by: EMERGENCY MEDICINE

## 2021-05-02 PROCEDURE — 63600175 PHARM REV CODE 636 W HCPCS: Performed by: EMERGENCY MEDICINE

## 2021-05-02 PROCEDURE — 85025 COMPLETE CBC W/AUTO DIFF WBC: CPT | Performed by: EMERGENCY MEDICINE

## 2021-05-02 PROCEDURE — 20000000 HC ICU ROOM

## 2021-05-02 PROCEDURE — 93005 ELECTROCARDIOGRAM TRACING: CPT

## 2021-05-02 PROCEDURE — 80307 DRUG TEST PRSMV CHEM ANLYZR: CPT | Performed by: HOSPITALIST

## 2021-05-02 PROCEDURE — 96374 THER/PROPH/DIAG INJ IV PUSH: CPT

## 2021-05-02 PROCEDURE — 96375 TX/PRO/DX INJ NEW DRUG ADDON: CPT

## 2021-05-02 PROCEDURE — 25000003 PHARM REV CODE 250: Performed by: HOSPITALIST

## 2021-05-02 PROCEDURE — 83036 HEMOGLOBIN GLYCOSYLATED A1C: CPT | Performed by: HOSPITALIST

## 2021-05-02 PROCEDURE — 83930 ASSAY OF BLOOD OSMOLALITY: CPT | Performed by: EMERGENCY MEDICINE

## 2021-05-02 PROCEDURE — 99291 CRITICAL CARE FIRST HOUR: CPT | Mod: 25

## 2021-05-02 PROCEDURE — 82010 KETONE BODYS QUAN: CPT | Performed by: EMERGENCY MEDICINE

## 2021-05-02 PROCEDURE — 93010 EKG 12-LEAD: ICD-10-PCS | Mod: ,,, | Performed by: INTERNAL MEDICINE

## 2021-05-02 PROCEDURE — 82010 KETONE BODYS QUAN: CPT | Mod: 91 | Performed by: INTERNAL MEDICINE

## 2021-05-02 PROCEDURE — 83605 ASSAY OF LACTIC ACID: CPT | Performed by: HOSPITALIST

## 2021-05-02 PROCEDURE — 82962 GLUCOSE BLOOD TEST: CPT

## 2021-05-02 RX ORDER — FAMOTIDINE 10 MG/ML
40 INJECTION INTRAVENOUS
Status: COMPLETED | OUTPATIENT
Start: 2021-05-02 | End: 2021-05-02

## 2021-05-02 RX ORDER — INSULIN ASPART 100 [IU]/ML
1-10 INJECTION, SOLUTION INTRAVENOUS; SUBCUTANEOUS
Status: DISCONTINUED | OUTPATIENT
Start: 2021-05-03 | End: 2021-05-03 | Stop reason: HOSPADM

## 2021-05-02 RX ORDER — MUPIROCIN 20 MG/G
OINTMENT TOPICAL 2 TIMES DAILY
Status: DISCONTINUED | OUTPATIENT
Start: 2021-05-02 | End: 2021-05-03 | Stop reason: HOSPADM

## 2021-05-02 RX ORDER — DIVALPROEX SODIUM 125 MG/1
125 TABLET, DELAYED RELEASE ORAL EVERY 8 HOURS
Status: DISCONTINUED | OUTPATIENT
Start: 2021-05-02 | End: 2021-05-03 | Stop reason: HOSPADM

## 2021-05-02 RX ORDER — ONDANSETRON 2 MG/ML
8 INJECTION INTRAMUSCULAR; INTRAVENOUS EVERY 6 HOURS PRN
Status: DISCONTINUED | OUTPATIENT
Start: 2021-05-02 | End: 2021-05-03 | Stop reason: HOSPADM

## 2021-05-02 RX ORDER — IBUPROFEN 200 MG
24 TABLET ORAL
Status: DISCONTINUED | OUTPATIENT
Start: 2021-05-03 | End: 2021-05-03 | Stop reason: HOSPADM

## 2021-05-02 RX ORDER — GLUCAGON 1 MG
1 KIT INJECTION
Status: DISCONTINUED | OUTPATIENT
Start: 2021-05-03 | End: 2021-05-03 | Stop reason: HOSPADM

## 2021-05-02 RX ORDER — HALOPERIDOL 5 MG/ML
5 INJECTION INTRAMUSCULAR
Status: COMPLETED | OUTPATIENT
Start: 2021-05-02 | End: 2021-05-02

## 2021-05-02 RX ORDER — SUCRALFATE 1 G/10ML
1 SUSPENSION ORAL
Status: COMPLETED | OUTPATIENT
Start: 2021-05-02 | End: 2021-05-02

## 2021-05-02 RX ORDER — HALOPERIDOL 5 MG/ML
5 INJECTION INTRAMUSCULAR EVERY 6 HOURS PRN
Status: DISCONTINUED | OUTPATIENT
Start: 2021-05-02 | End: 2021-05-03 | Stop reason: HOSPADM

## 2021-05-02 RX ORDER — ATORVASTATIN CALCIUM 10 MG/1
20 TABLET, FILM COATED ORAL DAILY
Status: DISCONTINUED | OUTPATIENT
Start: 2021-05-02 | End: 2021-05-03 | Stop reason: HOSPADM

## 2021-05-02 RX ORDER — ACETAMINOPHEN 325 MG/1
650 TABLET ORAL EVERY 4 HOURS PRN
Status: DISCONTINUED | OUTPATIENT
Start: 2021-05-02 | End: 2021-05-03 | Stop reason: HOSPADM

## 2021-05-02 RX ORDER — INSULIN ASPART 100 [IU]/ML
15 INJECTION, SOLUTION INTRAVENOUS; SUBCUTANEOUS
Status: DISCONTINUED | OUTPATIENT
Start: 2021-05-03 | End: 2021-05-03 | Stop reason: HOSPADM

## 2021-05-02 RX ORDER — ONDANSETRON 2 MG/ML
4 INJECTION INTRAMUSCULAR; INTRAVENOUS
Status: COMPLETED | OUTPATIENT
Start: 2021-05-02 | End: 2021-05-02

## 2021-05-02 RX ORDER — ENOXAPARIN SODIUM 100 MG/ML
30 INJECTION SUBCUTANEOUS EVERY 24 HOURS
Status: DISCONTINUED | OUTPATIENT
Start: 2021-05-02 | End: 2021-05-03 | Stop reason: HOSPADM

## 2021-05-02 RX ORDER — SODIUM CHLORIDE 0.9 % (FLUSH) 0.9 %
10 SYRINGE (ML) INJECTION
Status: DISCONTINUED | OUTPATIENT
Start: 2021-05-02 | End: 2021-05-03 | Stop reason: HOSPADM

## 2021-05-02 RX ORDER — IBUPROFEN 200 MG
16 TABLET ORAL
Status: DISCONTINUED | OUTPATIENT
Start: 2021-05-03 | End: 2021-05-03 | Stop reason: HOSPADM

## 2021-05-02 RX ORDER — SODIUM CHLORIDE 9 MG/ML
125 INJECTION, SOLUTION INTRAVENOUS CONTINUOUS
Status: DISCONTINUED | OUTPATIENT
Start: 2021-05-02 | End: 2021-05-03 | Stop reason: HOSPADM

## 2021-05-02 RX ORDER — IPRATROPIUM BROMIDE AND ALBUTEROL SULFATE 2.5; .5 MG/3ML; MG/3ML
3 SOLUTION RESPIRATORY (INHALATION) EVERY 4 HOURS PRN
Status: DISCONTINUED | OUTPATIENT
Start: 2021-05-02 | End: 2021-05-03 | Stop reason: HOSPADM

## 2021-05-02 RX ORDER — QUETIAPINE FUMARATE 25 MG/1
50 TABLET, FILM COATED ORAL NIGHTLY
Status: DISCONTINUED | OUTPATIENT
Start: 2021-05-02 | End: 2021-05-03 | Stop reason: HOSPADM

## 2021-05-02 RX ORDER — NAPROXEN SODIUM 220 MG/1
81 TABLET, FILM COATED ORAL DAILY
Status: DISCONTINUED | OUTPATIENT
Start: 2021-05-02 | End: 2021-05-03 | Stop reason: HOSPADM

## 2021-05-02 RX ORDER — CLOPIDOGREL BISULFATE 75 MG/1
75 TABLET ORAL DAILY
Status: DISCONTINUED | OUTPATIENT
Start: 2021-05-02 | End: 2021-05-03 | Stop reason: HOSPADM

## 2021-05-02 RX ORDER — SODIUM CHLORIDE 9 MG/ML
INJECTION, SOLUTION INTRAVENOUS
Status: COMPLETED | OUTPATIENT
Start: 2021-05-02 | End: 2021-05-02

## 2021-05-02 RX ORDER — DEXTROSE MONOHYDRATE AND SODIUM CHLORIDE 5; .45 G/100ML; G/100ML
125 INJECTION, SOLUTION INTRAVENOUS CONTINUOUS PRN
Status: DISCONTINUED | OUTPATIENT
Start: 2021-05-02 | End: 2021-05-03 | Stop reason: HOSPADM

## 2021-05-02 RX ADMIN — ENOXAPARIN SODIUM 30 MG: 30 INJECTION SUBCUTANEOUS at 05:05

## 2021-05-02 RX ADMIN — FAMOTIDINE 40 MG: 10 INJECTION INTRAVENOUS at 05:05

## 2021-05-02 RX ADMIN — QUETIAPINE FUMARATE 50 MG: 25 TABLET ORAL at 09:05

## 2021-05-02 RX ADMIN — DIVALPROEX SODIUM 125 MG: 125 TABLET, DELAYED RELEASE ORAL at 01:05

## 2021-05-02 RX ADMIN — SODIUM CHLORIDE 125 ML/HR: 0.9 INJECTION, SOLUTION INTRAVENOUS at 09:05

## 2021-05-02 RX ADMIN — SODIUM CHLORIDE 6 UNITS/HR: 9 INJECTION, SOLUTION INTRAVENOUS at 06:05

## 2021-05-02 RX ADMIN — ATORVASTATIN CALCIUM 20 MG: 10 TABLET, FILM COATED ORAL at 09:05

## 2021-05-02 RX ADMIN — CLOPIDOGREL 75 MG: 75 TABLET, FILM COATED ORAL at 09:05

## 2021-05-02 RX ADMIN — SODIUM CHLORIDE: 0.9 INJECTION, SOLUTION INTRAVENOUS at 07:05

## 2021-05-02 RX ADMIN — DIVALPROEX SODIUM 125 MG: 125 TABLET, DELAYED RELEASE ORAL at 09:05

## 2021-05-02 RX ADMIN — HALOPERIDOL LACTATE 5 MG: 5 INJECTION, SOLUTION INTRAMUSCULAR at 05:05

## 2021-05-02 RX ADMIN — SODIUM CHLORIDE 1000 ML: 0.9 INJECTION, SOLUTION INTRAVENOUS at 05:05

## 2021-05-02 RX ADMIN — ASPIRIN 81 MG: 81 TABLET, CHEWABLE ORAL at 09:05

## 2021-05-02 RX ADMIN — MUPIROCIN: 20 OINTMENT TOPICAL at 09:05

## 2021-05-02 RX ADMIN — DEXTROSE AND SODIUM CHLORIDE 125 ML/HR: 5; .45 INJECTION, SOLUTION INTRAVENOUS at 05:05

## 2021-05-02 RX ADMIN — INSULIN HUMAN 6 UNITS: 100 INJECTION, SOLUTION PARENTERAL at 06:05

## 2021-05-02 RX ADMIN — SUCRALFATE 1 G: 1 SUSPENSION ORAL at 05:05

## 2021-05-02 RX ADMIN — ONDANSETRON 4 MG: 2 INJECTION INTRAMUSCULAR; INTRAVENOUS at 05:05

## 2021-05-03 VITALS
RESPIRATION RATE: 17 BRPM | DIASTOLIC BLOOD PRESSURE: 64 MMHG | SYSTOLIC BLOOD PRESSURE: 114 MMHG | HEART RATE: 73 BPM | HEIGHT: 66 IN | BODY MASS INDEX: 18.28 KG/M2 | WEIGHT: 113.75 LBS | TEMPERATURE: 98 F | OXYGEN SATURATION: 99 %

## 2021-05-03 PROBLEM — E10.10 TYPE 1 DIABETES MELLITUS WITH KETOACIDOSIS WITHOUT COMA: Status: RESOLVED | Noted: 2021-05-02 | Resolved: 2021-05-03

## 2021-05-03 PROBLEM — E87.1 HYPONATREMIA: Status: RESOLVED | Noted: 2019-04-13 | Resolved: 2021-05-03

## 2021-05-03 PROBLEM — N17.9 ARF (ACUTE RENAL FAILURE): Status: RESOLVED | Noted: 2019-04-13 | Resolved: 2021-05-03

## 2021-05-03 PROBLEM — E87.5 HYPERKALEMIA: Status: RESOLVED | Noted: 2019-04-13 | Resolved: 2021-05-03

## 2021-05-03 PROBLEM — J86.9 EMPYEMA LUNG: Status: RESOLVED | Noted: 2020-04-22 | Resolved: 2021-05-03

## 2021-05-03 LAB
ANION GAP SERPL CALC-SCNC: 5 MMOL/L (ref 8–16)
ANION GAP SERPL CALC-SCNC: 7 MMOL/L (ref 8–16)
BASOPHILS # BLD AUTO: 0.02 K/UL (ref 0–0.2)
BASOPHILS NFR BLD: 0.3 % (ref 0–1.9)
BUN SERPL-MCNC: 16 MG/DL (ref 6–20)
BUN SERPL-MCNC: 20 MG/DL (ref 6–20)
CALCIUM SERPL-MCNC: 7.9 MG/DL (ref 8.7–10.5)
CALCIUM SERPL-MCNC: 8 MG/DL (ref 8.7–10.5)
CHLORIDE SERPL-SCNC: 110 MMOL/L (ref 95–110)
CHLORIDE SERPL-SCNC: 111 MMOL/L (ref 95–110)
CO2 SERPL-SCNC: 17 MMOL/L (ref 23–29)
CO2 SERPL-SCNC: 22 MMOL/L (ref 23–29)
CREAT SERPL-MCNC: 1 MG/DL (ref 0.5–1.4)
CREAT SERPL-MCNC: 1.2 MG/DL (ref 0.5–1.4)
DIFFERENTIAL METHOD: ABNORMAL
EOSINOPHIL # BLD AUTO: 0.3 K/UL (ref 0–0.5)
EOSINOPHIL NFR BLD: 5.2 % (ref 0–8)
ERYTHROCYTE [DISTWIDTH] IN BLOOD BY AUTOMATED COUNT: 15.4 % (ref 11.5–14.5)
EST. GFR  (AFRICAN AMERICAN): >60 ML/MIN/1.73 M^2
EST. GFR  (AFRICAN AMERICAN): >60 ML/MIN/1.73 M^2
EST. GFR  (NON AFRICAN AMERICAN): >60 ML/MIN/1.73 M^2
EST. GFR  (NON AFRICAN AMERICAN): >60 ML/MIN/1.73 M^2
GLUCOSE SERPL-MCNC: 193 MG/DL (ref 70–110)
GLUCOSE SERPL-MCNC: 221 MG/DL (ref 70–110)
HCT VFR BLD AUTO: 35.8 % (ref 40–54)
HGB BLD-MCNC: 11.8 G/DL (ref 14–18)
IMM GRANULOCYTES # BLD AUTO: 0.03 K/UL (ref 0–0.04)
IMM GRANULOCYTES NFR BLD AUTO: 0.5 % (ref 0–0.5)
LYMPHOCYTES # BLD AUTO: 0.9 K/UL (ref 1–4.8)
LYMPHOCYTES NFR BLD: 15.9 % (ref 18–48)
MCH RBC QN AUTO: 26.5 PG (ref 27–31)
MCHC RBC AUTO-ENTMCNC: 33 G/DL (ref 32–36)
MCV RBC AUTO: 80 FL (ref 82–98)
MONOCYTES # BLD AUTO: 0.8 K/UL (ref 0.3–1)
MONOCYTES NFR BLD: 14 % (ref 4–15)
NEUTROPHILS # BLD AUTO: 3.7 K/UL (ref 1.8–7.7)
NEUTROPHILS NFR BLD: 64.1 % (ref 38–73)
NRBC BLD-RTO: 0 /100 WBC
PHOSPHATE SERPL-MCNC: 1.5 MG/DL (ref 2.7–4.5)
PLATELET # BLD AUTO: 229 K/UL (ref 150–450)
PMV BLD AUTO: 8.9 FL (ref 9.2–12.9)
POCT GLUCOSE: 167 MG/DL (ref 70–110)
POCT GLUCOSE: 183 MG/DL (ref 70–110)
POCT GLUCOSE: 223 MG/DL (ref 70–110)
POCT GLUCOSE: 224 MG/DL (ref 70–110)
POCT GLUCOSE: 248 MG/DL (ref 70–110)
POCT GLUCOSE: 287 MG/DL (ref 70–110)
POTASSIUM SERPL-SCNC: 3.8 MMOL/L (ref 3.5–5.1)
POTASSIUM SERPL-SCNC: 4.8 MMOL/L (ref 3.5–5.1)
RBC # BLD AUTO: 4.46 M/UL (ref 4.6–6.2)
SODIUM SERPL-SCNC: 135 MMOL/L (ref 136–145)
SODIUM SERPL-SCNC: 137 MMOL/L (ref 136–145)
WBC # BLD AUTO: 5.78 K/UL (ref 3.9–12.7)

## 2021-05-03 PROCEDURE — C9399 UNCLASSIFIED DRUGS OR BIOLOG: HCPCS | Performed by: INTERNAL MEDICINE

## 2021-05-03 PROCEDURE — 25000003 PHARM REV CODE 250: Performed by: INTERNAL MEDICINE

## 2021-05-03 PROCEDURE — 84100 ASSAY OF PHOSPHORUS: CPT | Performed by: INTERNAL MEDICINE

## 2021-05-03 PROCEDURE — 25000003 PHARM REV CODE 250: Performed by: HOSPITALIST

## 2021-05-03 PROCEDURE — S5010 5% DEXTROSE AND 0.45% SALINE: HCPCS | Performed by: HOSPITALIST

## 2021-05-03 PROCEDURE — 63600175 PHARM REV CODE 636 W HCPCS: Performed by: INTERNAL MEDICINE

## 2021-05-03 PROCEDURE — 80048 BASIC METABOLIC PNL TOTAL CA: CPT | Performed by: INTERNAL MEDICINE

## 2021-05-03 PROCEDURE — 85025 COMPLETE CBC W/AUTO DIFF WBC: CPT | Performed by: INTERNAL MEDICINE

## 2021-05-03 PROCEDURE — 36415 COLL VENOUS BLD VENIPUNCTURE: CPT | Performed by: INTERNAL MEDICINE

## 2021-05-03 RX ORDER — INSULIN GLARGINE 100 [IU]/ML
32 INJECTION, SOLUTION SUBCUTANEOUS NIGHTLY
Qty: 9.6 ML | Refills: 5 | Status: SHIPPED | OUTPATIENT
Start: 2021-05-03 | End: 2021-09-16 | Stop reason: SDUPTHER

## 2021-05-03 RX ORDER — SODIUM,POTASSIUM PHOSPHATES 280-250MG
2 POWDER IN PACKET (EA) ORAL ONCE
Status: COMPLETED | OUTPATIENT
Start: 2021-05-03 | End: 2021-05-03

## 2021-05-03 RX ORDER — INSULIN LISPRO 100 [IU]/ML
15 INJECTION, SOLUTION INTRAVENOUS; SUBCUTANEOUS
Qty: 9 ML | Refills: 5 | Status: SHIPPED | OUTPATIENT
Start: 2021-05-03 | End: 2021-09-16 | Stop reason: SDUPTHER

## 2021-05-03 RX ADMIN — INSULIN DETEMIR 35 UNITS: 100 INJECTION, SOLUTION SUBCUTANEOUS at 12:05

## 2021-05-03 RX ADMIN — INSULIN DETEMIR 35 UNITS: 100 INJECTION, SOLUTION SUBCUTANEOUS at 08:05

## 2021-05-03 RX ADMIN — INSULIN ASPART 2 UNITS: 100 INJECTION, SOLUTION INTRAVENOUS; SUBCUTANEOUS at 08:05

## 2021-05-03 RX ADMIN — DEXTROSE AND SODIUM CHLORIDE 125 ML/HR: 5; .45 INJECTION, SOLUTION INTRAVENOUS at 02:05

## 2021-05-03 RX ADMIN — ATORVASTATIN CALCIUM 20 MG: 10 TABLET, FILM COATED ORAL at 08:05

## 2021-05-03 RX ADMIN — CLOPIDOGREL 75 MG: 75 TABLET, FILM COATED ORAL at 08:05

## 2021-05-03 RX ADMIN — POTASSIUM & SODIUM PHOSPHATES POWDER PACK 280-160-250 MG 2 PACKET: 280-160-250 PACK at 11:05

## 2021-05-03 RX ADMIN — DEXTROSE AND SODIUM CHLORIDE 125 ML/HR: 5; .45 INJECTION, SOLUTION INTRAVENOUS at 01:05

## 2021-05-03 RX ADMIN — ASPIRIN 81 MG: 81 TABLET, CHEWABLE ORAL at 08:05

## 2021-05-03 RX ADMIN — MUPIROCIN: 20 OINTMENT TOPICAL at 08:05

## 2021-05-03 RX ADMIN — INSULIN ASPART 15 UNITS: 100 INJECTION, SOLUTION INTRAVENOUS; SUBCUTANEOUS at 08:05

## 2021-05-03 RX ADMIN — SODIUM CHLORIDE 125 ML/HR: 0.9 INJECTION, SOLUTION INTRAVENOUS at 05:05

## 2021-05-03 RX ADMIN — DIVALPROEX SODIUM 125 MG: 125 TABLET, DELAYED RELEASE ORAL at 06:05

## 2021-05-04 ENCOUNTER — PATIENT OUTREACH (OUTPATIENT)
Dept: ADMINISTRATIVE | Facility: CLINIC | Age: 56
End: 2021-05-04

## 2021-05-04 NOTE — PROGRESS NOTES
[facility] Hospital Medicine  Progress Note    Patient Name: Cali Mabry  MRN: [id number]  Patient Class: IP- Inpatient   Admission Date: 3/12/2019  Length of Stay: 4 days  Attending Physician: Shawnee Nicholas MD  Primary Care Provider: Stefan Hung MD        Subjective:     Principal Problem:Diabetic ketoacidosis    HPI:  Mr. Mabry is a 54 yo M who presents to the hospital stating he has not felt well for the past 2 days with N/V. Patient is known insulin dependent diabetic. He is also a know IV drug user. His mother notes issues of compliance with his insulin. He presents in DKA with AG of 28 and blood sugar > 700.  Patient is somewhat altered and most of the history is from the patient's mother. The patient presents with multiple electrolyte disorders, hypothermic and in acute renal failure (normal renal function 2018). The patient was started on an insulin and Hc03 drip and will be going to the ICU.  Cause of DKA could be from non-compliance, infection or possibly acute pancreatitis.     Hospital Course:  Mr. Mabry is a 54 yo M who presents to the hospital stating he has not felt well for the past 2 days with N/V. Patient is known insulin dependent diabetic. He is also a know IV drug user. His mother notes issues of compliance with his insulin. He presents in DKA with AG of 28 and blood sugar > 700.  Patient is somewhat altered and most of the history is from the patient's mother. The patient presents with multiple electrolyte disorders, hypothermic and in acute renal failure (normal renal function 2018). Patient was started on an insulin drip and sent to the ICU. Patient clinically improved over 24 hours. Blood cultures were NG.  Vanc stopped.  Patient started on diet and home insulin regimen. The patient was transferred to the floor on 3/13.     3/14- pt had elevated lipase and abdominal pain. Held NPO and given IVF for pancreatitis. Toradol given.   3/15- advance to liquid  Recommend artificial tears 2-3 times daily OU. Patietn c/o burning when putting in artificial tears; recommend to change artificial tears. diet and if stable dc tomorrow   3/16- advance to full liquids, blood sugars dropped to 30s, scaled back insulin, monitor     Interval History: pt reports back pain, c/o abdominal pain 5/10    Review of Systems   Constitutional: Negative for activity change.   HENT: Negative for congestion.    Respiratory: Negative for chest tightness and shortness of breath.    Cardiovascular: Negative for chest pain.   Gastrointestinal: Negative for abdominal pain.   Genitourinary: Negative for difficulty urinating.   Musculoskeletal: Negative for arthralgias.     Objective:     Vital Signs (Most Recent):  Temp: 98.9 °F (37.2 °C) (03/16/19 1140)  Pulse: 77 (03/16/19 1140)  Resp: 18 (03/16/19 1140)  BP: 126/87 (03/16/19 1140)  SpO2: 97 % (03/16/19 1140) Vital Signs (24h Range):  Temp:  [97.6 °F (36.4 °C)-99.2 °F (37.3 °C)] 98.9 °F (37.2 °C)  Pulse:  [57-81] 77  Resp:  [18] 18  SpO2:  [96 %-100 %] 97 %  BP: (124-149)/(71-94) 126/87     Weight: 53.1 kg (117 lb)  Body mass index is 18.32 kg/m².    Intake/Output Summary (Last 24 hours) at 3/16/2019 1322  Last data filed at 3/16/2019 0825  Gross per 24 hour   Intake 2360 ml   Output 2125 ml   Net 235 ml      Physical Exam   Constitutional: He is oriented to person, place, and time. He appears well-developed and well-nourished.   HENT:   Head: Normocephalic and atraumatic.   Cardiovascular: Normal rate and regular rhythm.   Pulmonary/Chest: Effort normal.   Neurological: He is alert and oriented to person, place, and time.   Psychiatric: He has a normal mood and affect. His behavior is normal.   Vitals reviewed.      Significant Labs:   Lipase:   Recent Labs   Lab 03/15/19  0500 03/16/19  0437   LIPASE 550* 520*     POCT Glucose:   Recent Labs   Lab 03/16/19  0752 03/16/19  0836 03/16/19  1143   POCTGLUCOSE 32* 162* 132*       Significant Imaging: I have reviewed and interpreted all pertinent imaging results/findings within the past 24 hours.    Assessment/Plan:      * Diabetic  ketoacidosis    Very severe. And very ill patient.  Reasons could be infection from IV drug use, pancreatitis, and/or compliance issues. Insulin drip and Hc03 for now.  Will cover with Vanc and Zosyn given critical nature of patient.  Will check blood cultures. IV insulin, fluids. BMP every 4 hours.      Resolved. D/C insulin drip. Start on home insulin therapy.  A1c        IV drug user    With heroin. Will watch for withdrawal. Not sure of last use.          Hypothermia    From acute illness. Warming blanket     Resolved.        Hyperkalemia    Should correct with fluids and Hc03. Repeat BMP in 4 hours     Resolved.        Acute renal failure    From above and from N/V. IV fluids.  BMP every 4.  Patient had normal function in 2018. Suspect will all correct with IV fluids.     Resolved.          Heroin abuse    Has been in Rehab in the past. Will consult  for out patient drug rehab resources.          Tobacco abuse    Smoking cessation education when appropriate        Chronic relapsing pancreatitis    May be cause of patient's abdominal pain. Lipase just a little elevated- in setting of volume contraction- may be falsely high.  Consider CT abdomen. NPO        Chronic hepatitis C    No acute issues.        Anemia of chronic disease    No acute issues        Intravenous drug abuse    Blood cultures are NG. Will d/c Vanc.        Hyperlipidemia    Resume home meds when able.        Pancreatitis, chronic    See above        Acute on chronic pancreatitis    Possible.  Abdominal pain resolved mainly.  Start on diet.   His mother denied ETOH use     Advance to full liquid diet          Essential hypertension    Resume home meds when able.        Type 2 diabetes mellitus, uncontrolled    Will check an A1c. Insulin drip for now.  No known manifestations          VTE Risk Mitigation (From admission, onward)        Ordered     enoxaparin injection 40 mg  Daily      03/13/19 1058     IP VTE LOW RISK PATIENT  Once       03/12/19 1614              Shawnee Nihcolas MD  Department of Hospital Medicine   [facility]

## 2021-09-16 ENCOUNTER — HOSPITAL ENCOUNTER (EMERGENCY)
Facility: HOSPITAL | Age: 56
Discharge: HOME | End: 2021-09-16
Attending: EMERGENCY MEDICINE
Payer: MEDICARE

## 2021-09-16 VITALS
SYSTOLIC BLOOD PRESSURE: 132 MMHG | TEMPERATURE: 99 F | RESPIRATION RATE: 19 BRPM | HEIGHT: 72 IN | BODY MASS INDEX: 16.25 KG/M2 | OXYGEN SATURATION: 98 % | WEIGHT: 120 LBS | HEART RATE: 99 BPM | DIASTOLIC BLOOD PRESSURE: 80 MMHG

## 2021-09-16 DIAGNOSIS — R33.9 INCOMPLETE BLADDER EMPTYING: ICD-10-CM

## 2021-09-16 DIAGNOSIS — R74.8 ELEVATED LIPASE: ICD-10-CM

## 2021-09-16 DIAGNOSIS — R11.10 NON-INTRACTABLE VOMITING, PRESENCE OF NAUSEA NOT SPECIFIED, UNSPECIFIED VOMITING TYPE: ICD-10-CM

## 2021-09-16 DIAGNOSIS — E86.0 DEHYDRATION: Primary | ICD-10-CM

## 2021-09-16 LAB
ALBUMIN SERPL BCP-MCNC: 3.5 G/DL (ref 3.5–5.2)
ALLENS TEST: ABNORMAL
ALP SERPL-CCNC: 118 U/L (ref 55–135)
ALT SERPL W/O P-5'-P-CCNC: 29 U/L (ref 10–44)
AMYLASE SERPL-CCNC: 287 U/L (ref 20–110)
ANION GAP SERPL CALC-SCNC: 10 MMOL/L (ref 8–16)
AST SERPL-CCNC: 15 U/L (ref 10–40)
B-OH-BUTYR BLD STRIP-SCNC: 0.3 MMOL/L (ref 0–0.5)
BACTERIA #/AREA URNS HPF: ABNORMAL /HPF
BASOPHILS # BLD AUTO: 0.03 K/UL (ref 0–0.2)
BASOPHILS NFR BLD: 0.2 % (ref 0–1.9)
BILIRUB SERPL-MCNC: 0.5 MG/DL (ref 0.1–1)
BILIRUB UR QL STRIP: ABNORMAL
BUN SERPL-MCNC: 36 MG/DL (ref 6–20)
CALCIUM SERPL-MCNC: 9.3 MG/DL (ref 8.7–10.5)
CHLORIDE SERPL-SCNC: 105 MMOL/L (ref 95–110)
CLARITY UR: CLEAR
CO2 SERPL-SCNC: 22 MMOL/L (ref 23–29)
COLOR UR: YELLOW
CREAT SERPL-MCNC: 1.5 MG/DL (ref 0.5–1.4)
DELSYS: ABNORMAL
DIFFERENTIAL METHOD: ABNORMAL
EOSINOPHIL # BLD AUTO: 0.1 K/UL (ref 0–0.5)
EOSINOPHIL NFR BLD: 0.9 % (ref 0–8)
ERYTHROCYTE [DISTWIDTH] IN BLOOD BY AUTOMATED COUNT: 14.3 % (ref 11.5–14.5)
EST. GFR  (AFRICAN AMERICAN): 60 ML/MIN/1.73 M^2
EST. GFR  (NON AFRICAN AMERICAN): 52 ML/MIN/1.73 M^2
GLUCOSE SERPL-MCNC: 119 MG/DL (ref 70–110)
GLUCOSE UR QL STRIP: ABNORMAL
HCO3 UR-SCNC: 26.1 MMOL/L (ref 24–28)
HCT VFR BLD AUTO: 39.3 % (ref 40–54)
HGB BLD-MCNC: 13.5 G/DL (ref 14–18)
HGB UR QL STRIP: ABNORMAL
HYALINE CASTS #/AREA URNS LPF: >10 /LPF
IMM GRANULOCYTES # BLD AUTO: 0.07 K/UL (ref 0–0.04)
IMM GRANULOCYTES NFR BLD AUTO: 0.6 % (ref 0–0.5)
KETONES UR QL STRIP: ABNORMAL
LEUKOCYTE ESTERASE UR QL STRIP: NEGATIVE
LIPASE SERPL-CCNC: 162 U/L (ref 4–60)
LYMPHOCYTES # BLD AUTO: 1.7 K/UL (ref 1–4.8)
LYMPHOCYTES NFR BLD: 13.4 % (ref 18–48)
MCH RBC QN AUTO: 28.8 PG (ref 27–31)
MCHC RBC AUTO-ENTMCNC: 34.4 G/DL (ref 32–36)
MCV RBC AUTO: 84 FL (ref 82–98)
MICROSCOPIC COMMENT: ABNORMAL
MONOCYTES # BLD AUTO: 1.3 K/UL (ref 0.3–1)
MONOCYTES NFR BLD: 9.9 % (ref 4–15)
NEUTROPHILS # BLD AUTO: 9.5 K/UL (ref 1.8–7.7)
NEUTROPHILS NFR BLD: 75 % (ref 38–73)
NITRITE UR QL STRIP: NEGATIVE
NRBC BLD-RTO: 0 /100 WBC
PCO2 BLDA: 44.9 MMHG (ref 35–45)
PH SMN: 7.37 [PH] (ref 7.35–7.45)
PH UR STRIP: 6 [PH] (ref 5–8)
PLATELET # BLD AUTO: 388 K/UL (ref 150–450)
PMV BLD AUTO: 9 FL (ref 9.2–12.9)
PO2 BLDA: 32 MMHG (ref 40–60)
POC BE: 0 MMOL/L
POC SATURATED O2: 60 % (ref 95–100)
POC TCO2: 27 MMOL/L (ref 24–29)
POTASSIUM SERPL-SCNC: 4 MMOL/L (ref 3.5–5.1)
PROT SERPL-MCNC: 7.7 G/DL (ref 6–8.4)
PROT UR QL STRIP: ABNORMAL
RBC # BLD AUTO: 4.69 M/UL (ref 4.6–6.2)
RBC #/AREA URNS HPF: 1 /HPF (ref 0–4)
SAMPLE: ABNORMAL
SITE: ABNORMAL
SODIUM SERPL-SCNC: 137 MMOL/L (ref 136–145)
SP GR UR STRIP: 1.02 (ref 1–1.03)
URN SPEC COLLECT METH UR: ABNORMAL
UROBILINOGEN UR STRIP-ACNC: NEGATIVE EU/DL
WBC # BLD AUTO: 12.69 K/UL (ref 3.9–12.7)
WBC #/AREA URNS HPF: 3 /HPF (ref 0–5)
YEAST URNS QL MICRO: ABNORMAL

## 2021-09-16 PROCEDURE — 82800 BLOOD PH: CPT

## 2021-09-16 PROCEDURE — 99285 EMERGENCY DEPT VISIT HI MDM: CPT | Mod: 25

## 2021-09-16 PROCEDURE — 83690 ASSAY OF LIPASE: CPT | Performed by: EMERGENCY MEDICINE

## 2021-09-16 PROCEDURE — 96361 HYDRATE IV INFUSION ADD-ON: CPT

## 2021-09-16 PROCEDURE — 25000003 PHARM REV CODE 250: Performed by: EMERGENCY MEDICINE

## 2021-09-16 PROCEDURE — 82010 KETONE BODYS QUAN: CPT | Performed by: EMERGENCY MEDICINE

## 2021-09-16 PROCEDURE — 82150 ASSAY OF AMYLASE: CPT | Performed by: EMERGENCY MEDICINE

## 2021-09-16 PROCEDURE — 96374 THER/PROPH/DIAG INJ IV PUSH: CPT

## 2021-09-16 PROCEDURE — 99900035 HC TECH TIME PER 15 MIN (STAT)

## 2021-09-16 PROCEDURE — 81000 URINALYSIS NONAUTO W/SCOPE: CPT | Performed by: EMERGENCY MEDICINE

## 2021-09-16 PROCEDURE — 80053 COMPREHEN METABOLIC PANEL: CPT | Performed by: EMERGENCY MEDICINE

## 2021-09-16 PROCEDURE — 85025 COMPLETE CBC W/AUTO DIFF WBC: CPT | Performed by: EMERGENCY MEDICINE

## 2021-09-16 PROCEDURE — 63600175 PHARM REV CODE 636 W HCPCS: Performed by: EMERGENCY MEDICINE

## 2021-09-16 RX ORDER — INSULIN LISPRO 100 [IU]/ML
15 INJECTION, SOLUTION INTRAVENOUS; SUBCUTANEOUS
Qty: 9 ML | Refills: 5 | Status: ON HOLD | OUTPATIENT
Start: 2021-09-16 | End: 2022-03-09 | Stop reason: HOSPADM

## 2021-09-16 RX ORDER — INSULIN GLARGINE 100 [IU]/ML
32 INJECTION, SOLUTION SUBCUTANEOUS NIGHTLY
Qty: 9.6 ML | Refills: 5 | Status: ON HOLD | OUTPATIENT
Start: 2021-09-16 | End: 2022-03-09 | Stop reason: HOSPADM

## 2021-09-16 RX ORDER — POLYETHYLENE GLYCOL 3350 17 G/17G
17 POWDER, FOR SOLUTION ORAL EVERY 6 HOURS PRN
Qty: 24 PACKET | Refills: 0 | Status: SHIPPED | OUTPATIENT
Start: 2021-09-16 | End: 2022-09-02

## 2021-09-16 RX ORDER — ONDANSETRON 4 MG/1
4 TABLET, FILM COATED ORAL EVERY 8 HOURS PRN
Qty: 12 TABLET | Refills: 0 | Status: SHIPPED | OUTPATIENT
Start: 2021-09-16 | End: 2022-09-02

## 2021-09-16 RX ORDER — PANTOPRAZOLE SODIUM 40 MG/1
80 TABLET, DELAYED RELEASE ORAL
Status: COMPLETED | OUTPATIENT
Start: 2021-09-16 | End: 2021-09-16

## 2021-09-16 RX ORDER — DEXTROSE 4 G
TABLET,CHEWABLE ORAL
Qty: 1 EACH | Refills: 0 | Status: ON HOLD | OUTPATIENT
Start: 2021-09-16 | End: 2022-09-20

## 2021-09-16 RX ORDER — ONDANSETRON 2 MG/ML
4 INJECTION INTRAMUSCULAR; INTRAVENOUS
Status: COMPLETED | OUTPATIENT
Start: 2021-09-16 | End: 2021-09-16

## 2021-09-16 RX ORDER — PANTOPRAZOLE SODIUM 40 MG/1
TABLET, DELAYED RELEASE ORAL
Qty: 30 TABLET | Refills: 0 | Status: SHIPPED | OUTPATIENT
Start: 2021-09-16 | End: 2022-09-02

## 2021-09-16 RX ORDER — DICYCLOMINE HYDROCHLORIDE 10 MG/ML
20 INJECTION INTRAMUSCULAR
Status: DISCONTINUED | OUTPATIENT
Start: 2021-09-16 | End: 2021-09-16

## 2021-09-16 RX ORDER — PROMETHAZINE HYDROCHLORIDE 25 MG/1
25 SUPPOSITORY RECTAL EVERY 6 HOURS PRN
Qty: 12 SUPPOSITORY | Refills: 0 | Status: SHIPPED | OUTPATIENT
Start: 2021-09-16 | End: 2022-09-02

## 2021-09-16 RX ORDER — DIPHENHYDRAMINE HYDROCHLORIDE 50 MG/ML
25 INJECTION INTRAMUSCULAR; INTRAVENOUS
Status: DISCONTINUED | OUTPATIENT
Start: 2021-09-16 | End: 2021-09-16 | Stop reason: HOSPADM

## 2021-09-16 RX ORDER — LANCETS
EACH MISCELLANEOUS
Qty: 200 EACH | Refills: 0 | Status: SHIPPED | OUTPATIENT
Start: 2021-09-16 | End: 2022-12-12 | Stop reason: SDUPTHER

## 2021-09-16 RX ADMIN — SODIUM CHLORIDE 2000 ML: 0.9 INJECTION, SOLUTION INTRAVENOUS at 03:09

## 2021-09-16 RX ADMIN — ONDANSETRON 4 MG: 2 INJECTION INTRAMUSCULAR; INTRAVENOUS at 03:09

## 2021-09-16 RX ADMIN — SODIUM CHLORIDE 1000 ML: 0.9 INJECTION, SOLUTION INTRAVENOUS at 06:09

## 2021-09-16 RX ADMIN — PANTOPRAZOLE SODIUM 80 MG: 40 TABLET, DELAYED RELEASE ORAL at 03:09

## 2021-11-24 ENCOUNTER — HOSPITAL ENCOUNTER (OUTPATIENT)
Facility: HOSPITAL | Age: 56
Discharge: HOME | End: 2021-11-25
Attending: EMERGENCY MEDICINE | Admitting: HOSPITALIST
Payer: MEDICARE

## 2021-11-24 DIAGNOSIS — E08.10 DIABETIC KETOACIDOSIS WITHOUT COMA ASSOCIATED WITH DIABETES MELLITUS DUE TO UNDERLYING CONDITION: Primary | ICD-10-CM

## 2021-11-24 DIAGNOSIS — R10.9 ABDOMINAL PAIN: ICD-10-CM

## 2021-11-24 PROBLEM — E10.10 DIABETIC KETOACIDOSIS WITHOUT COMA ASSOCIATED WITH TYPE 1 DIABETES MELLITUS: Status: ACTIVE | Noted: 2021-11-24

## 2021-11-24 PROBLEM — N17.9 ACUTE RENAL FAILURE: Status: ACTIVE | Noted: 2021-11-24

## 2021-11-24 LAB
ALBUMIN SERPL BCP-MCNC: 4.7 G/DL (ref 3.5–5.2)
ALLENS TEST: ABNORMAL
ALP SERPL-CCNC: 142 U/L (ref 55–135)
ALT SERPL W/O P-5'-P-CCNC: 19 U/L (ref 10–44)
AMPHET+METHAMPHET UR QL: NEGATIVE
ANION GAP SERPL CALC-SCNC: 11 MMOL/L (ref 8–16)
ANION GAP SERPL CALC-SCNC: 25 MMOL/L (ref 8–16)
ANION GAP SERPL CALC-SCNC: 9 MMOL/L (ref 8–16)
AST SERPL-CCNC: 13 U/L (ref 10–40)
B-OH-BUTYR BLD STRIP-SCNC: 5.5 MMOL/L (ref 0–0.5)
BACTERIA #/AREA URNS HPF: NORMAL /HPF
BARBITURATES UR QL SCN>200 NG/ML: NEGATIVE
BASOPHILS # BLD AUTO: 0.04 K/UL (ref 0–0.2)
BASOPHILS NFR BLD: 0.4 % (ref 0–1.9)
BENZODIAZ UR QL SCN>200 NG/ML: NEGATIVE
BILIRUB SERPL-MCNC: 0.4 MG/DL (ref 0.1–1)
BILIRUB UR QL STRIP: NEGATIVE
BUN SERPL-MCNC: 27 MG/DL (ref 6–20)
BUN SERPL-MCNC: 28 MG/DL (ref 6–20)
BUN SERPL-MCNC: 34 MG/DL (ref 6–20)
BZE UR QL SCN: NEGATIVE
CALCIUM SERPL-MCNC: 11 MG/DL (ref 8.7–10.5)
CALCIUM SERPL-MCNC: 8 MG/DL (ref 8.7–10.5)
CALCIUM SERPL-MCNC: 8.4 MG/DL (ref 8.7–10.5)
CANNABINOIDS UR QL SCN: NEGATIVE
CHLORIDE SERPL-SCNC: 109 MMOL/L (ref 95–110)
CHLORIDE SERPL-SCNC: 110 MMOL/L (ref 95–110)
CHLORIDE SERPL-SCNC: 98 MMOL/L (ref 95–110)
CLARITY UR: CLEAR
CO2 SERPL-SCNC: 14 MMOL/L (ref 23–29)
CO2 SERPL-SCNC: 19 MMOL/L (ref 23–29)
CO2 SERPL-SCNC: 20 MMOL/L (ref 23–29)
COLOR UR: YELLOW
CREAT SERPL-MCNC: 1.3 MG/DL (ref 0.5–1.4)
CREAT SERPL-MCNC: 1.3 MG/DL (ref 0.5–1.4)
CREAT SERPL-MCNC: 2.1 MG/DL (ref 0.5–1.4)
CREAT UR-MCNC: 38 MG/DL (ref 23–375)
CTP QC/QA: YES
DELSYS: ABNORMAL
DIFFERENTIAL METHOD: ABNORMAL
EOSINOPHIL # BLD AUTO: 0.2 K/UL (ref 0–0.5)
EOSINOPHIL NFR BLD: 1.5 % (ref 0–8)
ERYTHROCYTE [DISTWIDTH] IN BLOOD BY AUTOMATED COUNT: 12.9 % (ref 11.5–14.5)
EST. GFR  (AFRICAN AMERICAN): 40 ML/MIN/1.73 M^2
EST. GFR  (AFRICAN AMERICAN): >60 ML/MIN/1.73 M^2
EST. GFR  (AFRICAN AMERICAN): >60 ML/MIN/1.73 M^2
EST. GFR  (NON AFRICAN AMERICAN): 34 ML/MIN/1.73 M^2
EST. GFR  (NON AFRICAN AMERICAN): >60 ML/MIN/1.73 M^2
EST. GFR  (NON AFRICAN AMERICAN): >60 ML/MIN/1.73 M^2
ETHANOL SERPL-MCNC: <10 MG/DL
GLUCOSE SERPL-MCNC: 188 MG/DL (ref 70–110)
GLUCOSE SERPL-MCNC: 190 MG/DL (ref 70–110)
GLUCOSE SERPL-MCNC: 501 MG/DL (ref 70–110)
GLUCOSE UR QL STRIP: ABNORMAL
HCO3 UR-SCNC: 16.4 MMOL/L (ref 24–28)
HCT VFR BLD AUTO: 50 % (ref 40–54)
HGB BLD-MCNC: 16 G/DL (ref 14–18)
HGB UR QL STRIP: NEGATIVE
IMM GRANULOCYTES # BLD AUTO: 0.07 K/UL (ref 0–0.04)
IMM GRANULOCYTES NFR BLD AUTO: 0.7 % (ref 0–0.5)
KETONES UR QL STRIP: ABNORMAL
LACTATE SERPL-SCNC: 2.4 MMOL/L (ref 0.5–2.2)
LEUKOCYTE ESTERASE UR QL STRIP: NEGATIVE
LIPASE SERPL-CCNC: 74 U/L (ref 4–60)
LYMPHOCYTES # BLD AUTO: 1 K/UL (ref 1–4.8)
LYMPHOCYTES NFR BLD: 10 % (ref 18–48)
MCH RBC QN AUTO: 27.9 PG (ref 27–31)
MCHC RBC AUTO-ENTMCNC: 32 G/DL (ref 32–36)
MCV RBC AUTO: 87 FL (ref 82–98)
METHADONE UR QL SCN>300 NG/ML: NEGATIVE
MICROSCOPIC COMMENT: NORMAL
MONOCYTES # BLD AUTO: 0.6 K/UL (ref 0.3–1)
MONOCYTES NFR BLD: 5.9 % (ref 4–15)
NEUTROPHILS # BLD AUTO: 8.4 K/UL (ref 1.8–7.7)
NEUTROPHILS NFR BLD: 81.5 % (ref 38–73)
NITRITE UR QL STRIP: NEGATIVE
NRBC BLD-RTO: 0 /100 WBC
OPIATES UR QL SCN: NEGATIVE
PCO2 BLDA: 41.9 MMHG (ref 35–45)
PCP UR QL SCN>25 NG/ML: NEGATIVE
PH SMN: 7.2 [PH] (ref 7.35–7.45)
PH UR STRIP: 5 [PH] (ref 5–8)
PLATELET # BLD AUTO: 475 K/UL (ref 150–450)
PMV BLD AUTO: 9.2 FL (ref 9.2–12.9)
PO2 BLDA: 23 MMHG (ref 40–60)
POC BE: -11 MMOL/L
POC SATURATED O2: 29 % (ref 95–100)
POC TCO2: 18 MMOL/L (ref 24–29)
POCT GLUCOSE: 170 MG/DL (ref 70–110)
POCT GLUCOSE: 182 MG/DL (ref 70–110)
POCT GLUCOSE: 183 MG/DL (ref 70–110)
POCT GLUCOSE: 196 MG/DL (ref 70–110)
POCT GLUCOSE: 304 MG/DL (ref 70–110)
POCT GLUCOSE: >500 MG/DL (ref 70–110)
POTASSIUM SERPL-SCNC: 4.2 MMOL/L (ref 3.5–5.1)
POTASSIUM SERPL-SCNC: 4.5 MMOL/L (ref 3.5–5.1)
POTASSIUM SERPL-SCNC: 4.9 MMOL/L (ref 3.5–5.1)
PROT SERPL-MCNC: 10.2 G/DL (ref 6–8.4)
PROT UR QL STRIP: ABNORMAL
RBC # BLD AUTO: 5.74 M/UL (ref 4.6–6.2)
RBC #/AREA URNS HPF: 2 /HPF (ref 0–4)
SAMPLE: ABNORMAL
SARS-COV-2 RDRP RESP QL NAA+PROBE: NEGATIVE
SITE: ABNORMAL
SODIUM SERPL-SCNC: 137 MMOL/L (ref 136–145)
SODIUM SERPL-SCNC: 138 MMOL/L (ref 136–145)
SODIUM SERPL-SCNC: 140 MMOL/L (ref 136–145)
SP GR UR STRIP: >1.03 (ref 1–1.03)
SQUAMOUS #/AREA URNS HPF: 0 /HPF
TOXICOLOGY INFORMATION: NORMAL
TROPONIN I SERPL DL<=0.01 NG/ML-MCNC: 0.01 NG/ML (ref 0–0.03)
URN SPEC COLLECT METH UR: ABNORMAL
UROBILINOGEN UR STRIP-ACNC: NEGATIVE EU/DL
VALPROATE SERPL-MCNC: <12.5 UG/ML (ref 50–100)
WBC # BLD AUTO: 10.32 K/UL (ref 3.9–12.7)
YEAST URNS QL MICRO: NORMAL

## 2021-11-24 PROCEDURE — 83605 ASSAY OF LACTIC ACID: CPT | Performed by: EMERGENCY MEDICINE

## 2021-11-24 PROCEDURE — 20000000 HC ICU ROOM

## 2021-11-24 PROCEDURE — 36415 COLL VENOUS BLD VENIPUNCTURE: CPT | Performed by: EMERGENCY MEDICINE

## 2021-11-24 PROCEDURE — 80307 DRUG TEST PRSMV CHEM ANLYZR: CPT | Performed by: EMERGENCY MEDICINE

## 2021-11-24 PROCEDURE — 96375 TX/PRO/DX INJ NEW DRUG ADDON: CPT

## 2021-11-24 PROCEDURE — 25000003 PHARM REV CODE 250: Performed by: EMERGENCY MEDICINE

## 2021-11-24 PROCEDURE — 96365 THER/PROPH/DIAG IV INF INIT: CPT | Mod: 59

## 2021-11-24 PROCEDURE — 82803 BLOOD GASES ANY COMBINATION: CPT

## 2021-11-24 PROCEDURE — 85025 COMPLETE CBC W/AUTO DIFF WBC: CPT | Performed by: EMERGENCY MEDICINE

## 2021-11-24 PROCEDURE — 25000003 PHARM REV CODE 250: Performed by: HOSPITALIST

## 2021-11-24 PROCEDURE — 93010 EKG 12-LEAD: ICD-10-PCS | Mod: ,,, | Performed by: INTERNAL MEDICINE

## 2021-11-24 PROCEDURE — 93010 ELECTROCARDIOGRAM REPORT: CPT | Mod: ,,, | Performed by: INTERNAL MEDICINE

## 2021-11-24 PROCEDURE — 99291 CRITICAL CARE FIRST HOUR: CPT | Mod: 25

## 2021-11-24 PROCEDURE — 93005 ELECTROCARDIOGRAM TRACING: CPT

## 2021-11-24 PROCEDURE — 36415 COLL VENOUS BLD VENIPUNCTURE: CPT | Performed by: HOSPITALIST

## 2021-11-24 PROCEDURE — 84484 ASSAY OF TROPONIN QUANT: CPT | Performed by: EMERGENCY MEDICINE

## 2021-11-24 PROCEDURE — 80053 COMPREHEN METABOLIC PANEL: CPT | Performed by: EMERGENCY MEDICINE

## 2021-11-24 PROCEDURE — 96361 HYDRATE IV INFUSION ADD-ON: CPT

## 2021-11-24 PROCEDURE — 83690 ASSAY OF LIPASE: CPT | Performed by: EMERGENCY MEDICINE

## 2021-11-24 PROCEDURE — G0378 HOSPITAL OBSERVATION PER HR: HCPCS

## 2021-11-24 PROCEDURE — 96372 THER/PROPH/DIAG INJ SC/IM: CPT | Mod: 59

## 2021-11-24 PROCEDURE — 87040 BLOOD CULTURE FOR BACTERIA: CPT | Mod: 59 | Performed by: EMERGENCY MEDICINE

## 2021-11-24 PROCEDURE — 80164 ASSAY DIPROPYLACETIC ACD TOT: CPT | Performed by: EMERGENCY MEDICINE

## 2021-11-24 PROCEDURE — S5010 5% DEXTROSE AND 0.45% SALINE: HCPCS | Performed by: HOSPITALIST

## 2021-11-24 PROCEDURE — C9399 UNCLASSIFIED DRUGS OR BIOLOG: HCPCS | Performed by: INTERNAL MEDICINE

## 2021-11-24 PROCEDURE — 82010 KETONE BODYS QUAN: CPT | Performed by: EMERGENCY MEDICINE

## 2021-11-24 PROCEDURE — U0002 COVID-19 LAB TEST NON-CDC: HCPCS | Performed by: EMERGENCY MEDICINE

## 2021-11-24 PROCEDURE — 63600175 PHARM REV CODE 636 W HCPCS: Performed by: EMERGENCY MEDICINE

## 2021-11-24 PROCEDURE — 83036 HEMOGLOBIN GLYCOSYLATED A1C: CPT | Performed by: HOSPITALIST

## 2021-11-24 PROCEDURE — 82077 ASSAY SPEC XCP UR&BREATH IA: CPT | Performed by: EMERGENCY MEDICINE

## 2021-11-24 PROCEDURE — 80048 BASIC METABOLIC PNL TOTAL CA: CPT | Performed by: HOSPITALIST

## 2021-11-24 PROCEDURE — 99900035 HC TECH TIME PER 15 MIN (STAT)

## 2021-11-24 PROCEDURE — 25000003 PHARM REV CODE 250: Performed by: INTERNAL MEDICINE

## 2021-11-24 PROCEDURE — 25500020 PHARM REV CODE 255: Performed by: EMERGENCY MEDICINE

## 2021-11-24 PROCEDURE — 81000 URINALYSIS NONAUTO W/SCOPE: CPT | Mod: 59 | Performed by: HOSPITALIST

## 2021-11-24 RX ORDER — DICYCLOMINE HYDROCHLORIDE 10 MG/ML
20 INJECTION INTRAMUSCULAR
Status: COMPLETED | OUTPATIENT
Start: 2021-11-24 | End: 2021-11-24

## 2021-11-24 RX ORDER — PANTOPRAZOLE SODIUM 40 MG/1
40 TABLET, DELAYED RELEASE ORAL DAILY
Status: DISCONTINUED | OUTPATIENT
Start: 2021-11-25 | End: 2021-11-25 | Stop reason: HOSPADM

## 2021-11-24 RX ORDER — SODIUM CHLORIDE 0.9 % (FLUSH) 0.9 %
10 SYRINGE (ML) INJECTION
Status: DISCONTINUED | OUTPATIENT
Start: 2021-11-24 | End: 2021-11-25 | Stop reason: HOSPADM

## 2021-11-24 RX ORDER — GABAPENTIN 300 MG/1
600 CAPSULE ORAL 3 TIMES DAILY
Status: DISCONTINUED | OUTPATIENT
Start: 2021-11-24 | End: 2021-11-25 | Stop reason: HOSPADM

## 2021-11-24 RX ORDER — DEXTROSE MONOHYDRATE AND SODIUM CHLORIDE 5; .45 G/100ML; G/100ML
125 INJECTION, SOLUTION INTRAVENOUS CONTINUOUS
Status: DISCONTINUED | OUTPATIENT
Start: 2021-11-24 | End: 2021-11-25

## 2021-11-24 RX ORDER — TALC
6 POWDER (GRAM) TOPICAL NIGHTLY PRN
Status: DISCONTINUED | OUTPATIENT
Start: 2021-11-24 | End: 2021-11-25 | Stop reason: HOSPADM

## 2021-11-24 RX ORDER — ONDANSETRON 2 MG/ML
8 INJECTION INTRAMUSCULAR; INTRAVENOUS
Status: COMPLETED | OUTPATIENT
Start: 2021-11-24 | End: 2021-11-24

## 2021-11-24 RX ORDER — ACETAMINOPHEN 325 MG/1
650 TABLET ORAL EVERY 6 HOURS PRN
Status: DISCONTINUED | OUTPATIENT
Start: 2021-11-24 | End: 2021-11-24 | Stop reason: SDUPTHER

## 2021-11-24 RX ORDER — QUETIAPINE FUMARATE 25 MG/1
75 TABLET, FILM COATED ORAL NIGHTLY
Status: DISCONTINUED | OUTPATIENT
Start: 2021-11-24 | End: 2021-11-25 | Stop reason: HOSPADM

## 2021-11-24 RX ORDER — ACETAMINOPHEN 325 MG/1
650 TABLET ORAL EVERY 4 HOURS PRN
Status: DISCONTINUED | OUTPATIENT
Start: 2021-11-24 | End: 2021-11-25 | Stop reason: HOSPADM

## 2021-11-24 RX ORDER — DIVALPROEX SODIUM 250 MG/1
250 TABLET, DELAYED RELEASE ORAL EVERY 8 HOURS
Status: DISCONTINUED | OUTPATIENT
Start: 2021-11-24 | End: 2021-11-25 | Stop reason: HOSPADM

## 2021-11-24 RX ORDER — GLUCAGON 1 MG
1 KIT INJECTION
Status: DISCONTINUED | OUTPATIENT
Start: 2021-11-24 | End: 2021-11-25 | Stop reason: HOSPADM

## 2021-11-24 RX ORDER — ONDANSETRON 2 MG/ML
4 INJECTION INTRAMUSCULAR; INTRAVENOUS EVERY 6 HOURS PRN
Status: DISCONTINUED | OUTPATIENT
Start: 2021-11-24 | End: 2021-11-25 | Stop reason: HOSPADM

## 2021-11-24 RX ORDER — IBUPROFEN 200 MG
24 TABLET ORAL
Status: DISCONTINUED | OUTPATIENT
Start: 2021-11-24 | End: 2021-11-25 | Stop reason: HOSPADM

## 2021-11-24 RX ORDER — DULOXETIN HYDROCHLORIDE 30 MG/1
60 CAPSULE, DELAYED RELEASE ORAL DAILY
Status: DISCONTINUED | OUTPATIENT
Start: 2021-11-25 | End: 2021-11-25 | Stop reason: HOSPADM

## 2021-11-24 RX ORDER — INSULIN ASPART 100 [IU]/ML
0-5 INJECTION, SOLUTION INTRAVENOUS; SUBCUTANEOUS
Status: DISCONTINUED | OUTPATIENT
Start: 2021-11-24 | End: 2021-11-25 | Stop reason: HOSPADM

## 2021-11-24 RX ORDER — INSULIN ASPART 100 [IU]/ML
5 INJECTION, SOLUTION INTRAVENOUS; SUBCUTANEOUS
Status: DISCONTINUED | OUTPATIENT
Start: 2021-11-24 | End: 2021-11-25 | Stop reason: HOSPADM

## 2021-11-24 RX ORDER — CLOPIDOGREL BISULFATE 75 MG/1
75 TABLET ORAL DAILY
Status: DISCONTINUED | OUTPATIENT
Start: 2021-11-25 | End: 2021-11-25 | Stop reason: HOSPADM

## 2021-11-24 RX ORDER — ATORVASTATIN CALCIUM 10 MG/1
20 TABLET, FILM COATED ORAL DAILY
Status: DISCONTINUED | OUTPATIENT
Start: 2021-11-25 | End: 2021-11-25 | Stop reason: HOSPADM

## 2021-11-24 RX ORDER — SODIUM CHLORIDE 9 MG/ML
125 INJECTION, SOLUTION INTRAVENOUS CONTINUOUS
Status: DISCONTINUED | OUTPATIENT
Start: 2021-11-24 | End: 2021-11-25

## 2021-11-24 RX ORDER — IBUPROFEN 200 MG
16 TABLET ORAL
Status: DISCONTINUED | OUTPATIENT
Start: 2021-11-24 | End: 2021-11-25 | Stop reason: HOSPADM

## 2021-11-24 RX ORDER — ASPIRIN 81 MG/1
81 TABLET ORAL DAILY
Status: DISCONTINUED | OUTPATIENT
Start: 2021-11-25 | End: 2021-11-25 | Stop reason: HOSPADM

## 2021-11-24 RX ORDER — IPRATROPIUM BROMIDE AND ALBUTEROL SULFATE 2.5; .5 MG/3ML; MG/3ML
3 SOLUTION RESPIRATORY (INHALATION) EVERY 6 HOURS PRN
Status: DISCONTINUED | OUTPATIENT
Start: 2021-11-24 | End: 2021-11-25 | Stop reason: HOSPADM

## 2021-11-24 RX ADMIN — QUETIAPINE FUMARATE 75 MG: 25 TABLET ORAL at 09:11

## 2021-11-24 RX ADMIN — ONDANSETRON 8 MG: 2 INJECTION INTRAMUSCULAR; INTRAVENOUS at 01:11

## 2021-11-24 RX ADMIN — GABAPENTIN 600 MG: 300 CAPSULE ORAL at 09:11

## 2021-11-24 RX ADMIN — SODIUM CHLORIDE 1000 ML: 0.9 INJECTION, SOLUTION INTRAVENOUS at 03:11

## 2021-11-24 RX ADMIN — IOHEXOL 75 ML: 350 INJECTION, SOLUTION INTRAVENOUS at 02:11

## 2021-11-24 RX ADMIN — INSULIN HUMAN 5 UNITS: 100 INJECTION, SOLUTION PARENTERAL at 03:11

## 2021-11-24 RX ADMIN — SODIUM CHLORIDE 2000 ML: 0.9 INJECTION, SOLUTION INTRAVENOUS at 01:11

## 2021-11-24 RX ADMIN — DICYCLOMINE HYDROCHLORIDE 20 MG: 20 INJECTION INTRAMUSCULAR at 01:11

## 2021-11-24 RX ADMIN — INSULIN HUMAN 5 UNITS/HR: 1 INJECTION, SOLUTION INTRAVENOUS at 03:11

## 2021-11-24 RX ADMIN — INSULIN DETEMIR 20 UNITS: 100 INJECTION, SOLUTION SUBCUTANEOUS at 09:11

## 2021-11-24 RX ADMIN — ACETAMINOPHEN 650 MG: 325 TABLET ORAL at 07:11

## 2021-11-24 RX ADMIN — DEXTROSE AND SODIUM CHLORIDE 125 ML/HR: 5; .45 INJECTION, SOLUTION INTRAVENOUS at 05:11

## 2021-11-24 RX ADMIN — DIVALPROEX SODIUM 250 MG: 250 TABLET, DELAYED RELEASE ORAL at 09:11

## 2021-11-25 VITALS
HEART RATE: 83 BPM | RESPIRATION RATE: 20 BRPM | OXYGEN SATURATION: 97 % | WEIGHT: 124.75 LBS | SYSTOLIC BLOOD PRESSURE: 110 MMHG | TEMPERATURE: 99 F | BODY MASS INDEX: 16.9 KG/M2 | DIASTOLIC BLOOD PRESSURE: 59 MMHG | HEIGHT: 72 IN

## 2021-11-25 LAB
ANION GAP SERPL CALC-SCNC: 6 MMOL/L (ref 8–16)
BUN SERPL-MCNC: 23 MG/DL (ref 6–20)
CALCIUM SERPL-MCNC: 8.8 MG/DL (ref 8.7–10.5)
CHLORIDE SERPL-SCNC: 111 MMOL/L (ref 95–110)
CO2 SERPL-SCNC: 24 MMOL/L (ref 23–29)
CREAT SERPL-MCNC: 1.1 MG/DL (ref 0.5–1.4)
EST. GFR  (AFRICAN AMERICAN): >60 ML/MIN/1.73 M^2
EST. GFR  (NON AFRICAN AMERICAN): >60 ML/MIN/1.73 M^2
ESTIMATED AVG GLUCOSE: 258 MG/DL (ref 68–131)
GLUCOSE SERPL-MCNC: 101 MG/DL (ref 70–110)
HBA1C MFR BLD: 10.6 % (ref 4–5.6)
PHOSPHATE SERPL-MCNC: 2.5 MG/DL (ref 2.7–4.5)
POCT GLUCOSE: 72 MG/DL (ref 70–110)
POCT GLUCOSE: 97 MG/DL (ref 70–110)
POTASSIUM SERPL-SCNC: 3.8 MMOL/L (ref 3.5–5.1)
SODIUM SERPL-SCNC: 141 MMOL/L (ref 136–145)

## 2021-11-25 PROCEDURE — G0378 HOSPITAL OBSERVATION PER HR: HCPCS

## 2021-11-25 PROCEDURE — 80048 BASIC METABOLIC PNL TOTAL CA: CPT | Performed by: HOSPITALIST

## 2021-11-25 PROCEDURE — 84100 ASSAY OF PHOSPHORUS: CPT | Performed by: HOSPITALIST

## 2021-11-25 PROCEDURE — 25000003 PHARM REV CODE 250: Performed by: HOSPITALIST

## 2021-11-25 PROCEDURE — 99900035 HC TECH TIME PER 15 MIN (STAT)

## 2021-11-25 PROCEDURE — 63600175 PHARM REV CODE 636 W HCPCS: Performed by: INTERNAL MEDICINE

## 2021-11-25 RX ADMIN — INSULIN ASPART 5 UNITS: 100 INJECTION, SOLUTION INTRAVENOUS; SUBCUTANEOUS at 08:11

## 2021-11-25 RX ADMIN — ASPIRIN 81 MG: 81 TABLET, COATED ORAL at 08:11

## 2021-11-25 RX ADMIN — DULOXETINE 60 MG: 30 CAPSULE, DELAYED RELEASE ORAL at 08:11

## 2021-11-25 RX ADMIN — GABAPENTIN 600 MG: 300 CAPSULE ORAL at 08:11

## 2021-11-25 RX ADMIN — CLOPIDOGREL 75 MG: 75 TABLET, FILM COATED ORAL at 08:11

## 2021-11-25 RX ADMIN — DIVALPROEX SODIUM 250 MG: 250 TABLET, DELAYED RELEASE ORAL at 06:11

## 2021-11-25 RX ADMIN — PANTOPRAZOLE SODIUM 40 MG: 40 TABLET, DELAYED RELEASE ORAL at 08:11

## 2021-11-25 RX ADMIN — ATORVASTATIN CALCIUM 20 MG: 10 TABLET, FILM COATED ORAL at 08:11

## 2021-11-28 LAB
BACTERIA BLD CULT: NORMAL

## 2021-12-06 ENCOUNTER — HOSPITAL ENCOUNTER (EMERGENCY)
Facility: HOSPITAL | Age: 56
Discharge: HOME | End: 2021-12-07
Attending: EMERGENCY MEDICINE
Payer: MEDICARE

## 2021-12-06 DIAGNOSIS — J44.1 COPD EXACERBATION: Primary | ICD-10-CM

## 2021-12-06 LAB
ALBUMIN SERPL BCP-MCNC: 3.5 G/DL (ref 3.5–5.2)
ALLENS TEST: ABNORMAL
ALP SERPL-CCNC: 95 U/L (ref 55–135)
ALT SERPL W/O P-5'-P-CCNC: 16 U/L (ref 10–44)
ANION GAP SERPL CALC-SCNC: 10 MMOL/L (ref 8–16)
AST SERPL-CCNC: 16 U/L (ref 10–40)
B-OH-BUTYR BLD STRIP-SCNC: 0.3 MMOL/L (ref 0–0.5)
BASOPHILS # BLD AUTO: 0.03 K/UL (ref 0–0.2)
BASOPHILS NFR BLD: 0.4 % (ref 0–1.9)
BILIRUB SERPL-MCNC: 0.3 MG/DL (ref 0.1–1)
BILIRUB UR QL STRIP: NEGATIVE
BUN SERPL-MCNC: 15 MG/DL (ref 6–20)
CALCIUM SERPL-MCNC: 9.2 MG/DL (ref 8.7–10.5)
CHLORIDE SERPL-SCNC: 103 MMOL/L (ref 95–110)
CLARITY UR: CLEAR
CO2 SERPL-SCNC: 28 MMOL/L (ref 23–29)
COLOR UR: YELLOW
CREAT SERPL-MCNC: 1.2 MG/DL (ref 0.5–1.4)
DELSYS: ABNORMAL
DIFFERENTIAL METHOD: ABNORMAL
EOSINOPHIL # BLD AUTO: 1 K/UL (ref 0–0.5)
EOSINOPHIL NFR BLD: 13.9 % (ref 0–8)
ERYTHROCYTE [DISTWIDTH] IN BLOOD BY AUTOMATED COUNT: 13.1 % (ref 11.5–14.5)
EST. GFR  (AFRICAN AMERICAN): >60 ML/MIN/1.73 M^2
EST. GFR  (NON AFRICAN AMERICAN): >60 ML/MIN/1.73 M^2
GLUCOSE SERPL-MCNC: 138 MG/DL (ref 70–110)
GLUCOSE SERPL-MCNC: 141 MG/DL (ref 70–110)
GLUCOSE UR QL STRIP: NEGATIVE
HCO3 UR-SCNC: 29.2 MMOL/L (ref 24–28)
HCT VFR BLD AUTO: 39 % (ref 40–54)
HGB BLD-MCNC: 12.5 G/DL (ref 14–18)
HGB UR QL STRIP: NEGATIVE
IMM GRANULOCYTES # BLD AUTO: 0.01 K/UL (ref 0–0.04)
IMM GRANULOCYTES NFR BLD AUTO: 0.1 % (ref 0–0.5)
KETONES UR QL STRIP: NEGATIVE
LEUKOCYTE ESTERASE UR QL STRIP: NEGATIVE
LYMPHOCYTES # BLD AUTO: 2.3 K/UL (ref 1–4.8)
LYMPHOCYTES NFR BLD: 31.9 % (ref 18–48)
MAGNESIUM SERPL-MCNC: 2 MG/DL (ref 1.6–2.6)
MCH RBC QN AUTO: 28 PG (ref 27–31)
MCHC RBC AUTO-ENTMCNC: 32.1 G/DL (ref 32–36)
MCV RBC AUTO: 87 FL (ref 82–98)
MONOCYTES # BLD AUTO: 0.8 K/UL (ref 0.3–1)
MONOCYTES NFR BLD: 10.9 % (ref 4–15)
NEUTROPHILS # BLD AUTO: 3 K/UL (ref 1.8–7.7)
NEUTROPHILS NFR BLD: 42.8 % (ref 38–73)
NITRITE UR QL STRIP: NEGATIVE
NRBC BLD-RTO: 0 /100 WBC
PCO2 BLDA: 50.4 MMHG (ref 35–45)
PH SMN: 7.37 [PH] (ref 7.35–7.45)
PH UR STRIP: 6 [PH] (ref 5–8)
PLATELET # BLD AUTO: 281 K/UL (ref 150–450)
PMV BLD AUTO: 9.2 FL (ref 9.2–12.9)
PO2 BLDA: 28 MMHG (ref 40–60)
POC BE: 3 MMOL/L
POC SATURATED O2: 50 % (ref 95–100)
POC TCO2: 31 MMOL/L (ref 24–29)
POCT GLUCOSE: 141 MG/DL (ref 70–110)
POTASSIUM SERPL-SCNC: 4.4 MMOL/L (ref 3.5–5.1)
PROT SERPL-MCNC: 7.6 G/DL (ref 6–8.4)
PROT UR QL STRIP: ABNORMAL
RBC # BLD AUTO: 4.46 M/UL (ref 4.6–6.2)
SAMPLE: ABNORMAL
SITE: ABNORMAL
SODIUM SERPL-SCNC: 141 MMOL/L (ref 136–145)
SP GR UR STRIP: 1.01 (ref 1–1.03)
TROPONIN I SERPL DL<=0.01 NG/ML-MCNC: <0.006 NG/ML (ref 0–0.03)
TSH SERPL DL<=0.005 MIU/L-ACNC: 0.7 UIU/ML (ref 0.4–4)
URN SPEC COLLECT METH UR: ABNORMAL
UROBILINOGEN UR STRIP-ACNC: NEGATIVE EU/DL
WBC # BLD AUTO: 7.06 K/UL (ref 3.9–12.7)

## 2021-12-06 PROCEDURE — 25000003 PHARM REV CODE 250: Performed by: EMERGENCY MEDICINE

## 2021-12-06 PROCEDURE — 96375 TX/PRO/DX INJ NEW DRUG ADDON: CPT

## 2021-12-06 PROCEDURE — 82010 KETONE BODYS QUAN: CPT | Performed by: EMERGENCY MEDICINE

## 2021-12-06 PROCEDURE — 84443 ASSAY THYROID STIM HORMONE: CPT | Performed by: EMERGENCY MEDICINE

## 2021-12-06 PROCEDURE — 93010 EKG 12-LEAD: ICD-10-PCS | Mod: ,,, | Performed by: INTERNAL MEDICINE

## 2021-12-06 PROCEDURE — 94640 AIRWAY INHALATION TREATMENT: CPT

## 2021-12-06 PROCEDURE — 63600175 PHARM REV CODE 636 W HCPCS: Performed by: EMERGENCY MEDICINE

## 2021-12-06 PROCEDURE — 99285 EMERGENCY DEPT VISIT HI MDM: CPT | Mod: 25

## 2021-12-06 PROCEDURE — 93005 ELECTROCARDIOGRAM TRACING: CPT

## 2021-12-06 PROCEDURE — 93010 ELECTROCARDIOGRAM REPORT: CPT | Mod: ,,, | Performed by: INTERNAL MEDICINE

## 2021-12-06 PROCEDURE — 82803 BLOOD GASES ANY COMBINATION: CPT

## 2021-12-06 PROCEDURE — 96361 HYDRATE IV INFUSION ADD-ON: CPT

## 2021-12-06 PROCEDURE — 83880 ASSAY OF NATRIURETIC PEPTIDE: CPT | Performed by: EMERGENCY MEDICINE

## 2021-12-06 PROCEDURE — 80053 COMPREHEN METABOLIC PANEL: CPT | Performed by: EMERGENCY MEDICINE

## 2021-12-06 PROCEDURE — 85025 COMPLETE CBC W/AUTO DIFF WBC: CPT | Performed by: EMERGENCY MEDICINE

## 2021-12-06 PROCEDURE — 25000242 PHARM REV CODE 250 ALT 637 W/ HCPCS: Performed by: EMERGENCY MEDICINE

## 2021-12-06 PROCEDURE — 81003 URINALYSIS AUTO W/O SCOPE: CPT | Performed by: EMERGENCY MEDICINE

## 2021-12-06 PROCEDURE — 96374 THER/PROPH/DIAG INJ IV PUSH: CPT

## 2021-12-06 PROCEDURE — 82962 GLUCOSE BLOOD TEST: CPT

## 2021-12-06 PROCEDURE — 84484 ASSAY OF TROPONIN QUANT: CPT | Performed by: EMERGENCY MEDICINE

## 2021-12-06 PROCEDURE — 83735 ASSAY OF MAGNESIUM: CPT | Performed by: EMERGENCY MEDICINE

## 2021-12-06 RX ORDER — METHYLPREDNISOLONE SOD SUCC 125 MG
125 VIAL (EA) INJECTION
Status: COMPLETED | OUTPATIENT
Start: 2021-12-06 | End: 2021-12-06

## 2021-12-06 RX ORDER — ONDANSETRON 2 MG/ML
4 INJECTION INTRAMUSCULAR; INTRAVENOUS
Status: COMPLETED | OUTPATIENT
Start: 2021-12-06 | End: 2021-12-06

## 2021-12-06 RX ORDER — IPRATROPIUM BROMIDE AND ALBUTEROL SULFATE 2.5; .5 MG/3ML; MG/3ML
3 SOLUTION RESPIRATORY (INHALATION)
Status: COMPLETED | OUTPATIENT
Start: 2021-12-06 | End: 2021-12-06

## 2021-12-06 RX ADMIN — IPRATROPIUM BROMIDE AND ALBUTEROL SULFATE 3 ML: 2.5; .5 SOLUTION RESPIRATORY (INHALATION) at 09:12

## 2021-12-06 RX ADMIN — METHYLPREDNISOLONE SODIUM SUCCINATE 125 MG: 125 INJECTION, POWDER, FOR SOLUTION INTRAMUSCULAR; INTRAVENOUS at 10:12

## 2021-12-06 RX ADMIN — SODIUM CHLORIDE 1000 ML: 0.9 INJECTION, SOLUTION INTRAVENOUS at 09:12

## 2021-12-06 RX ADMIN — ONDANSETRON 4 MG: 2 INJECTION INTRAMUSCULAR; INTRAVENOUS at 09:12

## 2021-12-07 VITALS
WEIGHT: 124 LBS | OXYGEN SATURATION: 95 % | RESPIRATION RATE: 23 BRPM | TEMPERATURE: 98 F | SYSTOLIC BLOOD PRESSURE: 122 MMHG | HEIGHT: 72 IN | DIASTOLIC BLOOD PRESSURE: 58 MMHG | HEART RATE: 90 BPM | BODY MASS INDEX: 16.8 KG/M2

## 2021-12-07 LAB — BNP SERPL-MCNC: <10 PG/ML (ref 0–99)

## 2021-12-07 RX ORDER — PREDNISONE 50 MG/1
50 TABLET ORAL DAILY
Qty: 5 TABLET | Refills: 0 | Status: SHIPPED | OUTPATIENT
Start: 2021-12-07 | End: 2021-12-12

## 2021-12-07 RX ORDER — ALBUTEROL SULFATE 90 UG/1
1-2 AEROSOL, METERED RESPIRATORY (INHALATION) EVERY 4 HOURS PRN
Qty: 8 G | Refills: 11 | Status: SHIPPED | OUTPATIENT
Start: 2021-12-07 | End: 2021-12-29 | Stop reason: SDUPTHER

## 2021-12-07 RX ORDER — IPRATROPIUM BROMIDE AND ALBUTEROL SULFATE 2.5; .5 MG/3ML; MG/3ML
3 SOLUTION RESPIRATORY (INHALATION) EVERY 4 HOURS PRN
Qty: 1 EACH | Refills: 11 | Status: SHIPPED | OUTPATIENT
Start: 2021-12-07 | End: 2021-12-29 | Stop reason: SDUPTHER

## 2021-12-15 ENCOUNTER — HOSPITAL ENCOUNTER (OUTPATIENT)
Facility: HOSPITAL | Age: 56
Discharge: LEFT BEFORE DISCHARGE | End: 2021-12-15
Attending: EMERGENCY MEDICINE | Admitting: STUDENT IN AN ORGANIZED HEALTH CARE EDUCATION/TRAINING PROGRAM
Payer: MEDICARE

## 2021-12-15 VITALS
SYSTOLIC BLOOD PRESSURE: 102 MMHG | WEIGHT: 130 LBS | HEART RATE: 93 BPM | DIASTOLIC BLOOD PRESSURE: 58 MMHG | RESPIRATION RATE: 26 BRPM | BODY MASS INDEX: 17.61 KG/M2 | OXYGEN SATURATION: 96 % | HEIGHT: 72 IN | TEMPERATURE: 98 F

## 2021-12-15 DIAGNOSIS — R07.9 CHEST PAIN: ICD-10-CM

## 2021-12-15 DIAGNOSIS — J44.9 CHRONIC OBSTRUCTIVE PULMONARY DISEASE, UNSPECIFIED COPD TYPE: ICD-10-CM

## 2021-12-15 DIAGNOSIS — R79.89 ELEVATED TROPONIN: ICD-10-CM

## 2021-12-15 DIAGNOSIS — R06.03 RESPIRATORY DISTRESS: Primary | ICD-10-CM

## 2021-12-15 DIAGNOSIS — R07.9 LEFT-SIDED CHEST PAIN: ICD-10-CM

## 2021-12-15 DIAGNOSIS — J98.01 ACUTE BRONCHOSPASM: ICD-10-CM

## 2021-12-15 DIAGNOSIS — I25.10 CORONARY ARTERY DISEASE INVOLVING NATIVE HEART WITHOUT ANGINA PECTORIS, UNSPECIFIED VESSEL OR LESION TYPE: ICD-10-CM

## 2021-12-15 PROBLEM — N17.9 ACUTE RENAL FAILURE: Status: RESOLVED | Noted: 2021-11-24 | Resolved: 2021-12-15

## 2021-12-15 LAB
ALBUMIN SERPL BCP-MCNC: 3.5 G/DL (ref 3.5–5.2)
ALP SERPL-CCNC: 100 U/L (ref 55–135)
ALT SERPL W/O P-5'-P-CCNC: 20 U/L (ref 10–44)
ANION GAP SERPL CALC-SCNC: 8 MMOL/L (ref 8–16)
AST SERPL-CCNC: 19 U/L (ref 10–40)
BASOPHILS # BLD AUTO: 0.02 K/UL (ref 0–0.2)
BASOPHILS NFR BLD: 0.2 % (ref 0–1.9)
BILIRUB SERPL-MCNC: 0.3 MG/DL (ref 0.1–1)
BNP SERPL-MCNC: 20 PG/ML (ref 0–99)
BUN SERPL-MCNC: 19 MG/DL (ref 6–20)
CALCIUM SERPL-MCNC: 9.1 MG/DL (ref 8.7–10.5)
CHLORIDE SERPL-SCNC: 102 MMOL/L (ref 95–110)
CO2 SERPL-SCNC: 26 MMOL/L (ref 23–29)
CREAT SERPL-MCNC: 1.1 MG/DL (ref 0.5–1.4)
CTP QC/QA: YES
DIFFERENTIAL METHOD: ABNORMAL
EOSINOPHIL # BLD AUTO: 0.8 K/UL (ref 0–0.5)
EOSINOPHIL NFR BLD: 9.2 % (ref 0–8)
ERYTHROCYTE [DISTWIDTH] IN BLOOD BY AUTOMATED COUNT: 13.3 % (ref 11.5–14.5)
EST. GFR  (AFRICAN AMERICAN): >60 ML/MIN/1.73 M^2
EST. GFR  (NON AFRICAN AMERICAN): >60 ML/MIN/1.73 M^2
GLUCOSE SERPL-MCNC: 377 MG/DL (ref 70–110)
HCT VFR BLD AUTO: 41.6 % (ref 40–54)
HGB BLD-MCNC: 13.1 G/DL (ref 14–18)
IMM GRANULOCYTES # BLD AUTO: 0.04 K/UL (ref 0–0.04)
IMM GRANULOCYTES NFR BLD AUTO: 0.5 % (ref 0–0.5)
LYMPHOCYTES # BLD AUTO: 1.7 K/UL (ref 1–4.8)
LYMPHOCYTES NFR BLD: 19.3 % (ref 18–48)
MCH RBC QN AUTO: 27.9 PG (ref 27–31)
MCHC RBC AUTO-ENTMCNC: 31.5 G/DL (ref 32–36)
MCV RBC AUTO: 89 FL (ref 82–98)
MONOCYTES # BLD AUTO: 1 K/UL (ref 0.3–1)
MONOCYTES NFR BLD: 11.2 % (ref 4–15)
NEUTROPHILS # BLD AUTO: 5.1 K/UL (ref 1.8–7.7)
NEUTROPHILS NFR BLD: 59.6 % (ref 38–73)
NRBC BLD-RTO: 0 /100 WBC
PLATELET # BLD AUTO: 255 K/UL (ref 150–450)
PMV BLD AUTO: 9.5 FL (ref 9.2–12.9)
POCT GLUCOSE: 387 MG/DL (ref 70–110)
POTASSIUM SERPL-SCNC: 4.6 MMOL/L (ref 3.5–5.1)
PROT SERPL-MCNC: 7.5 G/DL (ref 6–8.4)
RBC # BLD AUTO: 4.7 M/UL (ref 4.6–6.2)
SARS-COV-2 RDRP RESP QL NAA+PROBE: NEGATIVE
SODIUM SERPL-SCNC: 136 MMOL/L (ref 136–145)
TROPONIN I SERPL DL<=0.01 NG/ML-MCNC: 0.04 NG/ML (ref 0–0.03)
TROPONIN I SERPL DL<=0.01 NG/ML-MCNC: 0.06 NG/ML (ref 0–0.03)
TROPONIN I SERPL DL<=0.01 NG/ML-MCNC: 0.06 NG/ML (ref 0–0.03)
WBC # BLD AUTO: 8.57 K/UL (ref 3.9–12.7)

## 2021-12-15 PROCEDURE — 94640 AIRWAY INHALATION TREATMENT: CPT

## 2021-12-15 PROCEDURE — 99285 EMERGENCY DEPT VISIT HI MDM: CPT | Mod: 25

## 2021-12-15 PROCEDURE — 84484 ASSAY OF TROPONIN QUANT: CPT | Mod: 91 | Performed by: HOSPITALIST

## 2021-12-15 PROCEDURE — G0378 HOSPITAL OBSERVATION PER HR: HCPCS

## 2021-12-15 PROCEDURE — 84484 ASSAY OF TROPONIN QUANT: CPT | Performed by: EMERGENCY MEDICINE

## 2021-12-15 PROCEDURE — 85025 COMPLETE CBC W/AUTO DIFF WBC: CPT | Performed by: EMERGENCY MEDICINE

## 2021-12-15 PROCEDURE — 36000 PLACE NEEDLE IN VEIN: CPT

## 2021-12-15 PROCEDURE — 93005 ELECTROCARDIOGRAM TRACING: CPT

## 2021-12-15 PROCEDURE — U0002 COVID-19 LAB TEST NON-CDC: HCPCS | Performed by: EMERGENCY MEDICINE

## 2021-12-15 PROCEDURE — 83880 ASSAY OF NATRIURETIC PEPTIDE: CPT | Performed by: EMERGENCY MEDICINE

## 2021-12-15 PROCEDURE — 93010 ELECTROCARDIOGRAM REPORT: CPT | Mod: ,,, | Performed by: INTERNAL MEDICINE

## 2021-12-15 PROCEDURE — 27000221 HC OXYGEN, UP TO 24 HOURS

## 2021-12-15 PROCEDURE — 93010 EKG 12-LEAD: ICD-10-PCS | Mod: ,,, | Performed by: INTERNAL MEDICINE

## 2021-12-15 PROCEDURE — 80053 COMPREHEN METABOLIC PANEL: CPT | Performed by: EMERGENCY MEDICINE

## 2021-12-15 PROCEDURE — 82962 GLUCOSE BLOOD TEST: CPT

## 2021-12-15 PROCEDURE — 25000242 PHARM REV CODE 250 ALT 637 W/ HCPCS: Performed by: EMERGENCY MEDICINE

## 2021-12-15 RX ORDER — PROCHLORPERAZINE EDISYLATE 5 MG/ML
5 INJECTION INTRAMUSCULAR; INTRAVENOUS EVERY 6 HOURS PRN
Status: DISCONTINUED | OUTPATIENT
Start: 2021-12-15 | End: 2021-12-15 | Stop reason: HOSPADM

## 2021-12-15 RX ORDER — DOCUSATE SODIUM 100 MG/1
100 CAPSULE, LIQUID FILLED ORAL 2 TIMES DAILY PRN
Status: DISCONTINUED | OUTPATIENT
Start: 2021-12-15 | End: 2021-12-15 | Stop reason: HOSPADM

## 2021-12-15 RX ORDER — TALC
6 POWDER (GRAM) TOPICAL NIGHTLY PRN
Status: DISCONTINUED | OUTPATIENT
Start: 2021-12-15 | End: 2021-12-15 | Stop reason: HOSPADM

## 2021-12-15 RX ORDER — QUETIAPINE FUMARATE 25 MG/1
50 TABLET, FILM COATED ORAL NIGHTLY
Status: DISCONTINUED | OUTPATIENT
Start: 2021-12-15 | End: 2021-12-15 | Stop reason: HOSPADM

## 2021-12-15 RX ORDER — LEVOFLOXACIN 750 MG/1
750 TABLET ORAL DAILY
Status: DISCONTINUED | OUTPATIENT
Start: 2021-12-16 | End: 2021-12-15 | Stop reason: HOSPADM

## 2021-12-15 RX ORDER — SIMETHICONE 80 MG
1 TABLET,CHEWABLE ORAL 4 TIMES DAILY PRN
Status: DISCONTINUED | OUTPATIENT
Start: 2021-12-15 | End: 2021-12-15 | Stop reason: HOSPADM

## 2021-12-15 RX ORDER — INSULIN ASPART 100 [IU]/ML
1-10 INJECTION, SOLUTION INTRAVENOUS; SUBCUTANEOUS
Status: DISCONTINUED | OUTPATIENT
Start: 2021-12-15 | End: 2021-12-15 | Stop reason: HOSPADM

## 2021-12-15 RX ORDER — IPRATROPIUM BROMIDE AND ALBUTEROL SULFATE 2.5; .5 MG/3ML; MG/3ML
3 SOLUTION RESPIRATORY (INHALATION)
Status: COMPLETED | OUTPATIENT
Start: 2021-12-15 | End: 2021-12-15

## 2021-12-15 RX ORDER — NALOXONE HCL 0.4 MG/ML
0.02 VIAL (ML) INJECTION
Status: DISCONTINUED | OUTPATIENT
Start: 2021-12-15 | End: 2021-12-15 | Stop reason: HOSPADM

## 2021-12-15 RX ORDER — ONDANSETRON 2 MG/ML
4 INJECTION INTRAMUSCULAR; INTRAVENOUS EVERY 8 HOURS PRN
Status: DISCONTINUED | OUTPATIENT
Start: 2021-12-15 | End: 2021-12-15 | Stop reason: HOSPADM

## 2021-12-15 RX ORDER — GLUCAGON 1 MG
1 KIT INJECTION
Status: DISCONTINUED | OUTPATIENT
Start: 2021-12-15 | End: 2021-12-15 | Stop reason: HOSPADM

## 2021-12-15 RX ORDER — ASPIRIN 81 MG/1
81 TABLET ORAL DAILY
Status: DISCONTINUED | OUTPATIENT
Start: 2021-12-16 | End: 2021-12-15 | Stop reason: HOSPADM

## 2021-12-15 RX ORDER — SODIUM CHLORIDE 0.9 % (FLUSH) 0.9 %
10 SYRINGE (ML) INJECTION EVERY 8 HOURS PRN
Status: DISCONTINUED | OUTPATIENT
Start: 2021-12-15 | End: 2021-12-15 | Stop reason: HOSPADM

## 2021-12-15 RX ORDER — ATORVASTATIN CALCIUM 10 MG/1
20 TABLET, FILM COATED ORAL DAILY
Status: DISCONTINUED | OUTPATIENT
Start: 2021-12-16 | End: 2021-12-15 | Stop reason: HOSPADM

## 2021-12-15 RX ORDER — IPRATROPIUM BROMIDE AND ALBUTEROL SULFATE 2.5; .5 MG/3ML; MG/3ML
3 SOLUTION RESPIRATORY (INHALATION) EVERY 4 HOURS PRN
Status: DISCONTINUED | OUTPATIENT
Start: 2021-12-15 | End: 2021-12-15 | Stop reason: HOSPADM

## 2021-12-15 RX ORDER — DIVALPROEX SODIUM 125 MG/1
125 TABLET, DELAYED RELEASE ORAL EVERY 8 HOURS
Status: DISCONTINUED | OUTPATIENT
Start: 2021-12-15 | End: 2021-12-15 | Stop reason: HOSPADM

## 2021-12-15 RX ORDER — MAG HYDROX/ALUMINUM HYD/SIMETH 200-200-20
30 SUSPENSION, ORAL (FINAL DOSE FORM) ORAL 4 TIMES DAILY PRN
Status: DISCONTINUED | OUTPATIENT
Start: 2021-12-15 | End: 2021-12-15 | Stop reason: HOSPADM

## 2021-12-15 RX ORDER — IBUPROFEN 200 MG
24 TABLET ORAL
Status: DISCONTINUED | OUTPATIENT
Start: 2021-12-15 | End: 2021-12-15 | Stop reason: HOSPADM

## 2021-12-15 RX ORDER — ACETAMINOPHEN 325 MG/1
650 TABLET ORAL EVERY 6 HOURS PRN
Status: DISCONTINUED | OUTPATIENT
Start: 2021-12-15 | End: 2021-12-15 | Stop reason: HOSPADM

## 2021-12-15 RX ORDER — POLYETHYLENE GLYCOL 3350 17 G/17G
17 POWDER, FOR SOLUTION ORAL DAILY
Status: DISCONTINUED | OUTPATIENT
Start: 2021-12-15 | End: 2021-12-15 | Stop reason: HOSPADM

## 2021-12-15 RX ORDER — IBUPROFEN 200 MG
16 TABLET ORAL
Status: DISCONTINUED | OUTPATIENT
Start: 2021-12-15 | End: 2021-12-15 | Stop reason: HOSPADM

## 2021-12-15 RX ORDER — ALBUTEROL SULFATE 2.5 MG/.5ML
5 SOLUTION RESPIRATORY (INHALATION)
Status: COMPLETED | OUTPATIENT
Start: 2021-12-15 | End: 2021-12-15

## 2021-12-15 RX ADMIN — IPRATROPIUM BROMIDE AND ALBUTEROL SULFATE 3 ML: 2.5; .5 SOLUTION RESPIRATORY (INHALATION) at 07:12

## 2021-12-15 RX ADMIN — IPRATROPIUM BROMIDE AND ALBUTEROL SULFATE 3 ML: 2.5; .5 SOLUTION RESPIRATORY (INHALATION) at 11:12

## 2021-12-15 RX ADMIN — ALBUTEROL SULFATE 5 MG: 2.5 SOLUTION RESPIRATORY (INHALATION) at 07:12

## 2021-12-15 NOTE — ED PROVIDER NOTES
"Encounter Date: 12/15/2021    SCRIBE #1 NOTE: I, Lolly [place], am scribing for, and in the presence of,  Cullen Beauchamp PA-C. I have scribed the following portions of the note - Other sections scribed: HPI, ROS.       History     Chief Complaint   Patient presents with    Shortness of Breath     Pt in respiratory distress and brought in by EMS. Reports sudden onset. Initial O2 sat 89 on scene, after 1 breathing tx pt has O2 sat of 99% on 4L/NC. Hx of COPD. Refused IV in field.      CC: Shortness of breath    HPI: This is a 56 y.o. M who has Acute on chronic pancreatitis, CHF, DM Type 1, Hepatitis C virus, and HTN who presents to the ED via EMS transportation for emergent evaluation of acute SOB that began 1 hour PTA. Pt has associated cough, and left sided CP. He describes the CP as a sharp pain. He states, "it feels like something is sitting on my chest." Pt's CP is exacerbated with coughing and with palpation. Pt reports a Hx of Emphysema, COPD, and placement of 2 cardiac stents. He states that he no longer smokes cigarettes. He is not on oxygen at home. Pt has a nebulizer machine at home that he uses as needed. He states that he has not been able to use his nebulizer machine lately due to misplacing the prescription for the nebulizer machine. Pt's last ED visit was last week. He is vaccinated against COVID. Pt denies fever, chills, ear pain, runny nose, nasal congestion, sore throat, abdominal pain, nausea, vomiting, diarrhea, dysuria, back pain, arm or leg problems, or rash.    The history is provided by the patient. No  was used.     Review of patient's allergies indicates:  No Known Allergies  Past Medical History:   Diagnosis Date    Acute on chronic pancreatitis 4/3/2016    CHF (congestive heart failure)     DKA (diabetic ketoacidoses) 02/2020    DM (diabetes mellitus), type 1     HCV (hepatitis C virus)     high VL     HTN (hypertension)     Hypomagnesemia 5/25/2020    IVDU " (intravenous drug user)     Heroin    Pancreatitis      Past Surgical History:   Procedure Laterality Date    CARDIAC SURGERY      stent placed. ([facility])    ESOPHAGOGASTRODUODENOSCOPY N/A 3/5/2020    Gastritis.  No H pylori.  Completed PPI for 1 month.  Procedure: EGD (ESOPHAGOGASTRODUODENOSCOPY);  Surgeon: Brinda Rene MD;  Location: [facility] ENDO;  Service: Endoscopy;  Laterality: N/A;  W421 A; x5445    LEFT HEART CATHETERIZATION Left 2020    Procedure: Left heart cath;  Surgeon: Fito Rangel MD;  Location: [facility] CATH LAB;  Service: Cardiology;  Laterality: Left;    SHOULDER SURGERY      right shoulder, spur removal.     Family History   Problem Relation Age of Onset    Asthma Mother      Social History     Tobacco Use    Smoking status: Current Every Day Smoker     Packs/day: 1.00     Years: 20.00     Pack years: 20.00     Types: Cigarettes     Start date: 4/3/1981     Last attempt to quit: 2020     Years since quittin.5    Smokeless tobacco: Never Used    Tobacco comment: states approx 1 per day.   Substance Use Topics    Alcohol use: Not Currently     Alcohol/week: 0.0 standard drinks    Drug use: Not Currently     Types: Heroin, IV     Review of Systems   Constitutional: Negative for chills and fever.   HENT: Negative for congestion, ear pain, rhinorrhea and sore throat.    Eyes: Negative for visual disturbance.   Respiratory: Positive for cough and shortness of breath.    Cardiovascular: Positive for chest pain.   Gastrointestinal: Negative for abdominal pain, diarrhea, nausea and vomiting.   Genitourinary: Negative for dysuria.   Musculoskeletal: Negative for back pain and myalgias.   Skin: Negative for rash.   Neurological: Positive for headaches. Negative for weakness.   Hematological: Does not bruise/bleed easily.       Physical Exam     Initial Vitals [12/15/21 0617]   BP Pulse Resp Temp SpO2   (!) 172/90 (!) 124 (!) 28 97.8 °F (36.6 °C) 99 %      MAP        --         Physical Exam  The patient was examined specifically for the following:   General:No significant distress, Good color, Warm and dry. Head and neck:Scalp atraumatic, Neck supple. Neurological:Appropriate conversation, Gross motor deficits. Eyes:Conjugate gaze, Clear corneas. ENT: No epistaxis. Cardiac: Regular rate and rhythm, Grossly normal heart tones. Pulmonary: Wheezing, Rales. Gastrointestinal: Abdominal tenderness, Abdominal distention. Musculoskeletal: Extremity deformity, Apparent pain with range of motion of the joints. Skin: Rash.   The findings on examination were normal except for the following:  Patient's blood pressure is 172/90.  The heart rate is 124. The respiratory rate is 28.  The patient is in moderate respiratory distress.  He has wheezing in all lung fields.  He has exquisite tenderness of the left pectoral chest just lateral to the sternal border, lower 3rd of the sternum.  The abdomen is nontender.  Heart tones are normal the patient has a tachycardia with a regular rhythm.  ED Course   Procedures  Labs Reviewed   COMPREHENSIVE METABOLIC PANEL - Abnormal; Notable for the following components:       Result Value    Glucose 377 (*)     All other components within normal limits   TROPONIN I - Abnormal; Notable for the following components:    Troponin I 0.036 (*)     All other components within normal limits   CBC W/ AUTO DIFFERENTIAL - Abnormal; Notable for the following components:    Hemoglobin 13.1 (*)     MCHC 31.5 (*)     Eos # 0.8 (*)     Eosinophil % 9.2 (*)     All other components within normal limits   TROPONIN I - Abnormal; Notable for the following components:    Troponin I 0.057 (*)     All other components within normal limits   TROPONIN I - Abnormal; Notable for the following components:    Troponin I 0.055 (*)     All other components within normal limits   SARS-COV-2 RDRP GENE - Normal   B-TYPE NATRIURETIC PEPTIDE   POCT GLUCOSE MONITORING CONTINUOUS     EKG  Readings: (Independently Interpreted)   This patient is in a sinus tachycardia with a heart rate of 115. There are no significant ST segment or T-wave changes.  The axis is.  There is no evidence of acute myocardial infarction or malignant arrhythmia.         Imaging Results          X-Ray Chest AP Portable (Final result)  Result time 12/15/21 07:12:51    Final result by Miquel Bustamante DO (12/15/21 07:12:51)                 Impression:      No acute cardiopulmonary abnormality.      Electronically signed by: Miquel Bustamante  Date:    12/15/2021  Time:    07:12             Narrative:    EXAMINATION:  XR CHEST AP PORTABLE    CLINICAL HISTORY:  chest pain;    TECHNIQUE:  Single frontal view of the chest was performed.    COMPARISON:  Chest radiograph from 12/06/2021.  Additional priors.    FINDINGS:  The lungs are hyperexpanded.  There is flattening of the hemidiaphragms.  There is continued chronic blunting of the right costophrenic angle.  There is no pneumothorax.  There is continued chronic coarse appearing interstitial prominence, similar to prior.  There is no new large focal consolidation.  There is no pneumothorax.    Cardiac silhouette is unremarkable.    The visualized osseous structures are intact                              This 56-year-old white male presents to the emergency room with a history of chronic obstructive pulmonary disease complaining of shortness of breath.  The patient had sats of 89% in the field.  He had respiratory distress here he had bronchospasm on his physical exam he was treated with nebulizers.  His respiratory symptoms improved.  The patient did have chest tightness.  He had an elevated troponin.  This was repeated the 2nd value was higher than the 1st.  I will admit this patient for observation for elevated troponins.  His chest pain is gone now.  The patient has a known history coronary artery disease.  He has had 2 stents.  Clinically his chest pain was left-sided he was  tender there and it was worse with deep breathing.  This sounds like a noncardiac syndrome.  I will admit him to rule out myocardial infarction.       Medications   albuterol-ipratropium 2.5 mg-0.5 mg/3 mL nebulizer solution 3 mL (3 mLs Nebulization Given 12/15/21 0726)   albuterol sulfate nebulizer solution 5 mg (5 mg Nebulization Given 12/15/21 0726)   albuterol-ipratropium 2.5 mg-0.5 mg/3 mL nebulizer solution 3 mL (3 mLs Nebulization Given 12/15/21 1107)              Scribe Attestation:   Scribe #1: I performed the above scribed service and the documentation accurately describes the services I performed. I attest to the accuracy of the note.               I personally performed the services described in this documentation.  All medical record  entries made by the scribe are at my direction and in my presence.  Signed, Dr. Beauchamp  Clinical Impression:   Final diagnoses:  [R07.9] Left-sided chest pain  [R06.03] Respiratory distress (Primary)  [J98.01] Acute bronchospasm  [J44.9] Chronic obstructive pulmonary disease, unspecified COPD type  [R77.8] Elevated troponin  [I25.10] Coronary artery disease involving native heart without angina pectoris, unspecified vessel or lesion type          ED Disposition Condition    Observation               Cullen Beauchamp MD  12/15/21 1513       Cullen Beauchamp MD  12/15/21 1514       Cullen Beauchamp MD  12/25/21 1030

## 2021-12-15 NOTE — ASSESSMENT & PLAN NOTE
5 minutes spent counseling the patient on smoking cessation and he is currently ready to stop smoking.  States he is trying to quit. He will be offereded a nicotine transdermal patch while hospitalized and monitored for withdrawal.  Will provide additional smoking cessation counseling prior to discharge.

## 2021-12-15 NOTE — Clinical Note
Diagnosis: Left-sided chest pain [[id number]]   Future Attending Provider: FLORA CASE [[id number]]   Admitting Provider:: FLORA CASE [[id number]]   Special Needs:: No Special Needs [1]

## 2021-12-15 NOTE — SUBJECTIVE & OBJECTIVE
Past Medical History:   Diagnosis Date    Acute on chronic pancreatitis 4/3/2016    CHF (congestive heart failure)     DKA (diabetic ketoacidoses) 02/2020    DM (diabetes mellitus), type 1     HCV (hepatitis C virus)     high VL     HTN (hypertension)     Hypomagnesemia 5/25/2020    IVDU (intravenous drug user)     Heroin    Pancreatitis        Past Surgical History:   Procedure Laterality Date    CARDIAC SURGERY  1999    stent placed. ([facility])    ESOPHAGOGASTRODUODENOSCOPY N/A 3/5/2020    Gastritis.  No H pylori.  Completed PPI for 1 month.  Procedure: EGD (ESOPHAGOGASTRODUODENOSCOPY);  Surgeon: Brinda Rene MD;  Location: [facility] ENDO;  Service: Endoscopy;  Laterality: N/A;  W421 A; [id number]    LEFT HEART CATHETERIZATION Left 9/28/2020    Procedure: Left heart cath;  Surgeon: Fito Rangel MD;  Location: [facility] CATH LAB;  Service: Cardiology;  Laterality: Left;    SHOULDER SURGERY  2013    right shoulder, spur removal.       Review of patient's allergies indicates:  No Known Allergies    No current facility-administered medications on file prior to encounter.     Current Outpatient Medications on File Prior to Encounter   Medication Sig    acetaminophen (TYLENOL) 650 MG TbSR Take 650 mg by mouth every 8 (eight) hours as needed.    albuterol (PROVENTIL/VENTOLIN HFA) 90 mcg/actuation inhaler Inhale 1-2 puffs into the lungs every 4 (four) hours as needed for Wheezing or Shortness of Breath. Rescue    albuterol-ipratropium (DUO-NEB) 2.5 mg-0.5 mg/3 mL nebulizer solution Take 3 mLs by nebulization every 4 (four) hours as needed for Wheezing or Shortness of Breath. Rescue    ASCORBIC ACID, VITAMIN C, ORAL Take 1 tablet by mouth once daily.    aspirin 81 MG Chew Take 1 tablet (81 mg total) by mouth once daily.    atorvastatin (LIPITOR) 20 MG tablet Take 20 mg by mouth once daily.    b complex vitamins tablet Take 1 tablet by mouth once daily.    bisacodyL (DULCOLAX) 10 mg Supp Place 10 mg  rectally daily as needed.    blood sugar diagnostic Strp Use To check blood glucose three times daily,    blood-glucose meter Misc To check Blood glucose three times daily,    calcium carbonate (TUMS) 200 mg calcium (500 mg) chewable tablet Take 1 tablet (500 mg total) by mouth daily as needed for Heartburn.    clopidogreL (PLAVIX) 75 mg tablet Take 1 tablet (75 mg total) by mouth once daily.    diphenhydrAMINE (BENADRYL) 25 mg capsule Take 25 mg by mouth every 6 (six) hours as needed for Itching.    divalproex (DEPAKOTE) 125 MG EC tablet Take 125 mg by mouth every 8 (eight) hours.    docusate sodium (COLACE) 100 MG capsule Take 1 capsule (100 mg total) by mouth 2 (two) times daily as needed for Constipation.    DULoxetine (CYMBALTA) 60 MG capsule Take 1 capsule (60 mg total) by mouth once daily.    folic acid-vit B6-vit B12 2.5-25-2 mg (FOLBIC OR EQUIV) 2.5-25-2 mg Tab Take 1 tablet by mouth once daily.    gabapentin (NEURONTIN) 300 MG capsule Take 2 capsules (600 mg total) by mouth 3 (three) times daily.    glucagon, human recombinant, (GLUCAGEN HYPOKIT) 1 mg SolR Inject 1 mg into the muscle as needed.    glucose 4 GM chewable tablet Take 16 g by mouth as needed for Low blood sugar.    HALOPERIDOL LACTATE INJ Inject 1 mg as directed every 4 (four) hours as needed.    hydroxyzine HCL (ATARAX) 25 MG tablet Take 1 tablet (25 mg total) by mouth 3 (three) times daily as needed for Anxiety.    insulin glargine (LANTUS U-100 INSULIN) 100 unit/mL injection Inject 32 Units into the skin every evening. (Patient taking differently: Inject 42 Units into the skin every evening.)    insulin lispro 100 unit/mL injection Inject 15 Units into the skin 3 (three) times daily before meals.    L. acidophilus/Strept/[place] p-wh[place] (RISAQUAD ORAL) Take 1 capsule by mouth once daily.    lancets Misc To check BG 3 times daily, to use with insurance preferred meter    lidocaine (LIDODERM) 5 % Place 1 patch onto the skin once  daily. Remove & Discard patch within 12 hours or as directed by MD.  Apply to shoulder for pain    melatonin (MELATIN) 3 mg tablet Take 2 tablets (6 mg total) by mouth nightly as needed for Insomnia.    mineral oil (FLEET OIL RETENTION) enema Place 1 enema rectally daily as needed for Constipation.    multivitamin (THERAGRAN) per tablet Take 1 tablet by mouth once daily.    naloxone (NARCAN) 4 mg/actuation Spry 4mg by nasal route as needed for opioid overdose; may repeat every 2-3 minutes in alternating nostrils until medical help arrives. Call 911    naloxone (NARCAN) 4 mg/actuation Spry 4mg by nasal route as needed for opioid overdose; may repeat every 2-3 minutes in alternating nostrils until medical help arrives. Call 911    nitroGLYCERIN (NITROSTAT) 0.4 MG SL tablet Place 1 tablet (0.4 mg total) under the tongue every 5 (five) minutes as needed for Chest pain.    ondansetron (ZOFRAN) 4 MG tablet Take 1 tablet (4 mg total) by mouth every 8 (eight) hours as needed (Nausea and vomiting).    pantoprazole (PROTONIX) 40 MG tablet Please take 1 tablet every 12 hours when you have abdominal discomfort, then take 1 tablet every day.    polyethylene glycol (GLYCOLAX) 17 gram PwPk Take 17 g by mouth every 6 (six) hours as needed (For 2 large bowel movements.  Mix packet in 8 oz of warm water).    promethazine (PHENERGAN) 25 MG suppository Place 1 suppository (25 mg total) rectally every 6 (six) hours as needed for Nausea.    QUEtiapine (SEROQUEL) 50 MG tablet Take 50 mg by mouth every evening.    traZODone (DESYREL) 50 MG tablet Take 25 mg by mouth every evening.     Family History     Problem Relation (Age of Onset)    Asthma Mother        Tobacco Use    Smoking status: Current Every Day Smoker     Packs/day: 1.00     Years: 20.00     Pack years: 20.00     Types: Cigarettes     Start date: 4/3/1981     Last attempt to quit: 2020     Years since quittin.5    Smokeless tobacco: Never Used    Tobacco  comment: states approx 1 per day.   Substance and Sexual Activity    Alcohol use: Not Currently     Alcohol/week: 0.0 standard drinks    Drug use: Not Currently     Types: Heroin, IV    Sexual activity: Yes     Partners: Female     Review of Systems   Constitutional: Negative for chills and fever.   Eyes: Negative for photophobia and visual disturbance.   Respiratory: Positive for cough, chest tightness and shortness of breath.    Cardiovascular: Positive for chest pain. Negative for palpitations and leg swelling.   Gastrointestinal: Negative for abdominal pain, diarrhea, nausea and vomiting.   Genitourinary: Negative for frequency, hematuria and urgency.   Skin: Negative for pallor, rash and wound.   Neurological: Negative for light-headedness and headaches.   Psychiatric/Behavioral: Negative for confusion and decreased concentration.     Objective:     Vital Signs (Most Recent):  Temp: 97.8 °F (36.6 °C) (12/15/21 0617)  Pulse: 93 (12/15/21 1419)  Resp: (!) 26 (12/15/21 1419)  BP: (!) 102/58 (12/15/21 1426)  SpO2: 96 % (12/15/21 1419) Vital Signs (24h Range):  Temp:  [97.8 °F (36.6 °C)] 97.8 °F (36.6 °C)  Pulse:  [] 93  Resp:  [20-28] 26  SpO2:  [94 %-99 %] 96 %  BP: (102-172)/(58-90) 102/58     Weight: 59 kg (130 lb)  Body mass index is 17.63 kg/m².    Physical Exam  Vitals and nursing note reviewed.   Constitutional:       General: He is not in acute distress.     Appearance: He is well-developed and well-nourished.   HENT:      Head: Normocephalic and atraumatic.      Right Ear: External ear normal.      Left Ear: External ear normal.      Nose: Nose normal.      Mouth/Throat:      Mouth: Oropharynx is clear and moist.   Eyes:      Extraocular Movements: EOM normal.      Conjunctiva/sclera: Conjunctivae normal.      Pupils: Pupils are equal, round, and reactive to light.   Cardiovascular:      Rate and Rhythm: Normal rate and regular rhythm.      Pulses: Intact distal pulses.   Pulmonary:      Effort:  Pulmonary effort is normal. No respiratory distress.      Breath sounds: Wheezing present. No rales.   Chest:      Chest wall: Tenderness present.   Abdominal:      General: Bowel sounds are normal. There is no distension.      Palpations: Abdomen is soft.      Tenderness: There is no abdominal tenderness.      Comments: No palpable hepatomegaly or splenomegaly   Musculoskeletal:         General: No tenderness or edema. Normal range of motion.      Cervical back: Normal range of motion and neck supple.   Skin:     General: Skin is warm and dry.   Neurological:      Mental Status: He is alert and oriented to person, place, and time.   Psychiatric:         Mood and Affect: Mood and affect normal.         Thought Content: Thought content normal.           CRANIAL NERVES     CN III, IV, VI   Pupils are equal, round, and reactive to light.  Extraocular motions are normal.        Significant Labs: All pertinent labs within the past 24 hours have been reviewed.    Significant Imaging: I have reviewed all pertinent imaging results/findings within the past 24 hours.

## 2021-12-15 NOTE — ED NOTES
Pt noted to have eloped. Charge rn aware. On last assessment, pt was awake, alert, ambulatory. Strong steady gait.

## 2021-12-15 NOTE — HPI
56 y.o. male with DM1, HTN, anemia, HLD, IV drug use, chronic hep C, tobacco use, insomnia, CAD, COPD presents with a complaint of shortness of breath.  In the ED, he was found to be hypoxic on room air and wheezing, given nebulizer treatment with improvement. Troponin incidentally found to be minimally elevated.  EKG without evidence of acute ischemia.  Routine labs, BNP, covid test, CXR all unremarkable for acute abnormality.  Placed in observation for ACS rule out.  He eloped from the ED prior to being seen.

## 2021-12-15 NOTE — ASSESSMENT & PLAN NOTE
Complaint of dyspnea, cough, and chest tightness with wheezing, he eloped from the ED after treatment with nebs.

## 2021-12-15 NOTE — ED TRIAGE NOTES
Patient identifiers for Cali Mabry 56 y.o. male checked and correct.  Chief Complaint   Patient presents with    Shortness of Breath     Pt in respiratory distress and brought in by EMS. Reports sudden onset. Initial O2 sat 89 on scene, after 1 breathing tx pt has O2 sat of 99% on 4L/NC. Hx of COPD. Refused IV in field.      Past Medical History:   Diagnosis Date    Acute on chronic pancreatitis 4/3/2016    CHF (congestive heart failure)     DKA (diabetic ketoacidoses) 02/2020    DM (diabetes mellitus), type 1     HCV (hepatitis C virus)     high VL     HTN (hypertension)     Hypomagnesemia 5/25/2020    IVDU (intravenous drug user)     Heroin    Pancreatitis      Allergies reported: Review of patient's allergies indicates:  No Known Allergies      LOC: Patient is awake, alert, and aware of environment with an appropriate affect. Patient is oriented x 3 and speaking appropriately  APPEARANCE: Patient resting comfortably and in no acute distress. Patient is clean and well groomed, patient's clothing is properly fastened.  SKIN: The skin is warm and dry. Patient has normal skin turgor and moist mucus membranes. Skin is intact; no bruising or breakdown noted.  MUSKULOSKELETAL: Patient is moving all extremities well, no obvious deformities noted. Pulses intact.   RESPIRATORY: Airway is open and patent. Respirations are spontaneous and mildy labored. +SOB   CARDIAC: Patient has a tachypneic with regular rhythm. Pt on cardiac monitor,No peripheral edema noted. + left sided chest pain when coughing, non radiating  ABDOMEN: No distention noted. Bowel sounds active in all 4 quadrants. Soft and tender upon palpation in LLQ.  NEUROLOGICAL:  PERRL. Facial expression is symmetrical. Hand grasps are equal bilaterally. Normal sensation in all extremities when touched with finger.

## 2021-12-15 NOTE — ASSESSMENT & PLAN NOTE
Last HgbA1c   Lab Results   Component Value Date    HGBA1C 10.6 (H) 11/24/2021     Continue basal insulin with PRN sliding scale  ACHS glucose monitoring   ADA diet

## 2021-12-29 ENCOUNTER — HOSPITAL ENCOUNTER (EMERGENCY)
Facility: HOSPITAL | Age: 56
Discharge: HOME | End: 2021-12-29
Attending: EMERGENCY MEDICINE
Payer: MEDICARE

## 2021-12-29 VITALS
TEMPERATURE: 98 F | HEART RATE: 84 BPM | DIASTOLIC BLOOD PRESSURE: 59 MMHG | BODY MASS INDEX: 17.63 KG/M2 | RESPIRATION RATE: 32 BRPM | WEIGHT: 130 LBS | OXYGEN SATURATION: 95 % | SYSTOLIC BLOOD PRESSURE: 91 MMHG

## 2021-12-29 DIAGNOSIS — M25.552 LEFT HIP PAIN: ICD-10-CM

## 2021-12-29 DIAGNOSIS — R73.9 HYPERGLYCEMIA: Primary | ICD-10-CM

## 2021-12-29 DIAGNOSIS — S09.8XXA BLUNT HEAD TRAUMA, INITIAL ENCOUNTER: ICD-10-CM

## 2021-12-29 DIAGNOSIS — M54.2 NECK PAIN ON LEFT SIDE: ICD-10-CM

## 2021-12-29 DIAGNOSIS — J44.9 CHRONIC OBSTRUCTIVE PULMONARY DISEASE, UNSPECIFIED COPD TYPE: ICD-10-CM
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
## 2021-12-29 LAB
ALBUMIN SERPL BCP-MCNC: 2.9 G/DL (ref 3.5–5.2)
ALLENS TEST: ABNORMAL
ALP SERPL-CCNC: 118 U/L (ref 55–135)
ALT SERPL W/O P-5'-P-CCNC: 8 U/L (ref 10–44)
AMPHET+METHAMPHET UR QL: NEGATIVE
ANION GAP SERPL CALC-SCNC: 11 MMOL/L (ref 8–16)
AST SERPL-CCNC: 11 U/L (ref 10–40)
B-OH-BUTYR BLD STRIP-SCNC: 0.1 MMOL/L (ref 0–0.5)
BACTERIA #/AREA URNS HPF: NORMAL /HPF
BARBITURATES UR QL SCN>200 NG/ML: NEGATIVE
BASOPHILS # BLD AUTO: 0.06 K/UL (ref 0–0.2)
BASOPHILS NFR BLD: 0.6 % (ref 0–1.9)
BENZODIAZ UR QL SCN>200 NG/ML: NEGATIVE
BILIRUB SERPL-MCNC: 0.2 MG/DL (ref 0.1–1)
BILIRUB UR QL STRIP: NEGATIVE
BUN SERPL-MCNC: 17 MG/DL (ref 6–20)
BZE UR QL SCN: NEGATIVE
CALCIUM SERPL-MCNC: 9.2 MG/DL (ref 8.7–10.5)
CANNABINOIDS UR QL SCN: NEGATIVE
CHLORIDE SERPL-SCNC: 96 MMOL/L (ref 95–110)
CLARITY UR: CLEAR
CO2 SERPL-SCNC: 26 MMOL/L (ref 23–29)
COLOR UR: YELLOW
CREAT SERPL-MCNC: 1.2 MG/DL (ref 0.5–1.4)
CREAT UR-MCNC: 49.7 MG/DL (ref 23–375)
DELSYS: ABNORMAL
DIFFERENTIAL METHOD: ABNORMAL
EOSINOPHIL # BLD AUTO: 0.6 K/UL (ref 0–0.5)
EOSINOPHIL NFR BLD: 5.6 % (ref 0–8)
ERYTHROCYTE [DISTWIDTH] IN BLOOD BY AUTOMATED COUNT: 13.1 % (ref 11.5–14.5)
EST. GFR  (AFRICAN AMERICAN): >60 ML/MIN/1.73 M^2
EST. GFR  (NON AFRICAN AMERICAN): >60 ML/MIN/1.73 M^2
FIO2: 21
GLUCOSE SERPL-MCNC: 417 MG/DL (ref 70–110)
GLUCOSE UR QL STRIP: ABNORMAL
HCO3 UR-SCNC: 23.8 MMOL/L (ref 24–28)
HCT VFR BLD AUTO: 36.1 % (ref 40–54)
HGB BLD-MCNC: 11.4 G/DL (ref 14–18)
HGB UR QL STRIP: NEGATIVE
IMM GRANULOCYTES # BLD AUTO: 0.18 K/UL (ref 0–0.04)
IMM GRANULOCYTES NFR BLD AUTO: 1.8 % (ref 0–0.5)
KETONES UR QL STRIP: NEGATIVE
LEUKOCYTE ESTERASE UR QL STRIP: NEGATIVE
LYMPHOCYTES # BLD AUTO: 1.9 K/UL (ref 1–4.8)
LYMPHOCYTES NFR BLD: 18.2 % (ref 18–48)
MCH RBC QN AUTO: 27.7 PG (ref 27–31)
MCHC RBC AUTO-ENTMCNC: 31.6 G/DL (ref 32–36)
MCV RBC AUTO: 88 FL (ref 82–98)
METHADONE UR QL SCN>300 NG/ML: NEGATIVE
MICROSCOPIC COMMENT: NORMAL
MODE: ABNORMAL
MONOCYTES # BLD AUTO: 1.2 K/UL (ref 0.3–1)
MONOCYTES NFR BLD: 11.8 % (ref 4–15)
NEUTROPHILS # BLD AUTO: 6.4 K/UL (ref 1.8–7.7)
NEUTROPHILS NFR BLD: 62 % (ref 38–73)
NITRITE UR QL STRIP: NEGATIVE
NRBC BLD-RTO: 0 /100 WBC
OPIATES UR QL SCN: NEGATIVE
PCO2 BLDA: 34.9 MMHG (ref 35–45)
PCP UR QL SCN>25 NG/ML: NEGATIVE
PH SMN: 7.44 [PH] (ref 7.35–7.45)
PH UR STRIP: 6 [PH] (ref 5–8)
PLATELET # BLD AUTO: 414 K/UL (ref 150–450)
PMV BLD AUTO: 9.7 FL (ref 9.2–12.9)
PO2 BLDA: 49 MMHG (ref 40–60)
POC BE: 0 MMOL/L
POC SATURATED O2: 86 % (ref 95–100)
POC TCO2: 25 MMOL/L (ref 24–29)
POCT GLUCOSE: 260 MG/DL (ref 70–110)
POTASSIUM SERPL-SCNC: 4.8 MMOL/L (ref 3.5–5.1)
PROT SERPL-MCNC: 8.1 G/DL (ref 6–8.4)
PROT UR QL STRIP: ABNORMAL
RBC # BLD AUTO: 4.12 M/UL (ref 4.6–6.2)
SAMPLE: ABNORMAL
SITE: ABNORMAL
SODIUM SERPL-SCNC: 133 MMOL/L (ref 136–145)
SP GR UR STRIP: >1.03 (ref 1–1.03)
TOXICOLOGY INFORMATION: NORMAL
TROPONIN I SERPL DL<=0.01 NG/ML-MCNC: 0.01 NG/ML (ref 0–0.03)
URN SPEC COLLECT METH UR: ABNORMAL
UROBILINOGEN UR STRIP-ACNC: NEGATIVE EU/DL
WBC # BLD AUTO: 10.27 K/UL (ref 3.9–12.7)
YEAST URNS QL MICRO: NORMAL

## 2021-12-29 PROCEDURE — 96375 TX/PRO/DX INJ NEW DRUG ADDON: CPT

## 2021-12-29 PROCEDURE — 25000003 PHARM REV CODE 250: Performed by: EMERGENCY MEDICINE

## 2021-12-29 PROCEDURE — 99900035 HC TECH TIME PER 15 MIN (STAT)

## 2021-12-29 PROCEDURE — 93010 EKG 12-LEAD: ICD-10-PCS | Mod: ,,, | Performed by: INTERNAL MEDICINE

## 2021-12-29 PROCEDURE — 82077 ASSAY SPEC XCP UR&BREATH IA: CPT | Performed by: EMERGENCY MEDICINE

## 2021-12-29 PROCEDURE — 93010 ELECTROCARDIOGRAM REPORT: CPT | Mod: ,,, | Performed by: INTERNAL MEDICINE

## 2021-12-29 PROCEDURE — 84484 ASSAY OF TROPONIN QUANT: CPT | Performed by: EMERGENCY MEDICINE

## 2021-12-29 PROCEDURE — 80053 COMPREHEN METABOLIC PANEL: CPT | Performed by: EMERGENCY MEDICINE

## 2021-12-29 PROCEDURE — 85025 COMPLETE CBC W/AUTO DIFF WBC: CPT | Performed by: EMERGENCY MEDICINE

## 2021-12-29 PROCEDURE — 63600175 PHARM REV CODE 636 W HCPCS: Performed by: EMERGENCY MEDICINE

## 2021-12-29 PROCEDURE — 93005 ELECTROCARDIOGRAM TRACING: CPT

## 2021-12-29 PROCEDURE — 82962 GLUCOSE BLOOD TEST: CPT

## 2021-12-29 PROCEDURE — 82803 BLOOD GASES ANY COMBINATION: CPT

## 2021-12-29 PROCEDURE — 96374 THER/PROPH/DIAG INJ IV PUSH: CPT

## 2021-12-29 PROCEDURE — 82010 KETONE BODYS QUAN: CPT | Performed by: EMERGENCY MEDICINE

## 2021-12-29 PROCEDURE — 96361 HYDRATE IV INFUSION ADD-ON: CPT

## 2021-12-29 PROCEDURE — 99285 EMERGENCY DEPT VISIT HI MDM: CPT | Mod: 25

## 2021-12-29 PROCEDURE — 80307 DRUG TEST PRSMV CHEM ANLYZR: CPT | Performed by: EMERGENCY MEDICINE

## 2021-12-29 PROCEDURE — 81000 URINALYSIS NONAUTO W/SCOPE: CPT | Performed by: EMERGENCY MEDICINE

## 2021-12-29 RX ORDER — KETOROLAC TROMETHAMINE 10 MG/1
10 TABLET, FILM COATED ORAL EVERY 6 HOURS PRN
Qty: 20 TABLET | Refills: 1 | Status: ON HOLD | OUTPATIENT
Start: 2021-12-29 | End: 2022-09-20

## 2021-12-29 RX ORDER — IPRATROPIUM BROMIDE AND ALBUTEROL SULFATE 2.5; .5 MG/3ML; MG/3ML
3 SOLUTION RESPIRATORY (INHALATION) EVERY 4 HOURS PRN
Qty: 30 EACH | Refills: 11 | Status: SHIPPED | OUTPATIENT
Start: 2021-12-29 | End: 2022-09-02

## 2021-12-29 RX ORDER — LIDOCAINE 50 MG/G
2 PATCH TOPICAL
Status: DISCONTINUED | OUTPATIENT
Start: 2021-12-29 | End: 2021-12-30 | Stop reason: HOSPADM

## 2021-12-29 RX ORDER — KETOROLAC TROMETHAMINE 30 MG/ML
30 INJECTION, SOLUTION INTRAMUSCULAR; INTRAVENOUS
Status: COMPLETED | OUTPATIENT
Start: 2021-12-29 | End: 2021-12-29

## 2021-12-29 RX ORDER — ALBUTEROL SULFATE 90 UG/1
2 AEROSOL, METERED RESPIRATORY (INHALATION) EVERY 4 HOURS PRN
Qty: 18 G | Refills: 11 | Status: ON HOLD | OUTPATIENT
Start: 2021-12-29 | End: 2022-09-20

## 2021-12-29 RX ORDER — LIDOCAINE 50 MG/G
1 PATCH TOPICAL DAILY
Qty: 5 PATCH | Refills: 0 | Status: ON HOLD | OUTPATIENT
Start: 2021-12-29 | End: 2022-09-20

## 2021-12-29 RX ORDER — ACETAMINOPHEN 500 MG
1000 TABLET ORAL
Status: COMPLETED | OUTPATIENT
Start: 2021-12-29 | End: 2021-12-29

## 2021-12-29 RX ADMIN — INSULIN HUMAN 7 UNITS: 100 INJECTION, SOLUTION PARENTERAL at 08:12

## 2021-12-29 RX ADMIN — LIDOCAINE 2 PATCH: 50 PATCH TOPICAL at 10:12

## 2021-12-29 RX ADMIN — KETOROLAC TROMETHAMINE 30 MG: 30 INJECTION, SOLUTION INTRAMUSCULAR at 09:12

## 2021-12-29 RX ADMIN — ACETAMINOPHEN 1000 MG: 500 TABLET ORAL at 07:12

## 2021-12-29 RX ADMIN — SODIUM CHLORIDE 1000 ML: 0.9 INJECTION, SOLUTION INTRAVENOUS at 07:12

## 2021-12-30 LAB
ETHANOL SERPL-MCNC: <10 MG/DL
POCT GLUCOSE: 488 MG/DL (ref 70–110)
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
## 2022-01-04 NOTE — HOSPITAL COURSE
Mr. Mabry was placed in observation for ACS rule out after incidental finding of minimal troponin elevation.  He eloped from the Emergency Department prior to my seeing him.

## 2022-01-04 NOTE — DISCHARGE SUMMARY
[facility] Emergency Dept  [facility] Medicine  Discharge Summary      Patient Name: Cali Mabry  MRN: [id number]  Patient Class: OP- Observation  Admission Date: 12/15/2021  Hospital Length of Stay: 0 days  Discharge Date and Time: 12/15/2021  7:10 PM  Attending Physician: Kalyani Alberto MD  Discharging Provider: Cj Ness Jr, NP  Primary Care Provider: Cullen Zimmer MD      HPI:   56 y.o. male with DM1, HTN, anemia, HLD, IV drug use, chronic hep C, tobacco use, insomnia, CAD, COPD presents with a complaint of shortness of breath.  In the ED, he was found to be hypoxic on room air and wheezing, given nebulizer treatment with improvement. Troponin incidentally found to be minimally elevated.  EKG without evidence of acute ischemia.  Routine labs, BNP, covid test, CXR all unremarkable for acute abnormality.  Placed in observation for ACS rule out.  He eloped from the ED prior to being seen.      * No surgery found *      Hospital Course:   Mr. Mabry was placed in observation for ACS rule out after incidental finding of minimal troponin elevation.  He eloped from the Emergency Department prior to my seeing him.       Goals of Care Treatment Preferences:  Code Status: Full Code      Consults:     * Elevated troponin  Minimal elevated in setting of hypoxia with flat trend, suspect demand.    COPD exacerbation  Complaint of dyspnea, cough, and chest tightness with wheezing, he eloped from the ED after treatment with nebs.    Intravenous drug abuse  Eloped from ED      Final Active Diagnoses:    Diagnosis Date Noted POA    PRINCIPAL PROBLEM:  Elevated troponin [R77.8] 03/15/2019 Yes    COPD exacerbation [J44.1] 08/08/2020 Yes    CAD (coronary artery disease) [I25.10] 08/13/2020 Yes     Chronic    Insomnia [G47.00] 04/21/2020 Yes     Chronic    Hyperlipidemia [E78.5] 11/23/2017 Yes     Chronic    Intravenous drug abuse [F19.10] 11/23/2017 Yes     Chronic    Chronic hepatitis C [B18.2]  11/23/2017 Yes     Chronic    Tobacco use disorder, moderate, dependence [F17.200] 11/23/2017 Yes     Chronic    Type 1 diabetes mellitus with hyperglycemia [E10.65] 04/03/2016 Yes     Chronic    Essential hypertension [I10] 04/03/2016 Yes     Chronic    Anemia of chronic disease [D63.8] 04/03/2016 Yes     Chronic      Problems Resolved During this Admission:       Discharged Condition: against medical advice    Disposition: Home or Self Care    Follow Up:    Patient Instructions:   No discharge procedures on file.    Significant Diagnostic Studies: n/a    Pending Diagnostic Studies:     None         Medications:  None    Indwelling Lines/Drains at time of discharge:   Lines/Drains/Airways     None                 Time spent on the discharge of patient: less than 30 minutes         Cj Ness Jr, NP  Department of Hospital Medicine  [facility] [place] - Emergency Dept

## 2022-03-07 ENCOUNTER — HOSPITAL ENCOUNTER (INPATIENT)
Facility: HOSPITAL | Age: 57
LOS: 2 days | Discharge: HOME | DRG: 638 | End: 2022-03-09
Attending: EMERGENCY MEDICINE | Admitting: EMERGENCY MEDICINE
Payer: MEDICARE

## 2022-03-07 DIAGNOSIS — R73.9 HYPERGLYCEMIA: ICD-10-CM

## 2022-03-07 DIAGNOSIS — R14.0 BLOATED ABDOMEN: ICD-10-CM

## 2022-03-07 PROBLEM — N18.9 ACUTE RENAL FAILURE SUPERIMPOSED ON CHRONIC KIDNEY DISEASE: Status: RESOLVED | Noted: 2021-11-24 | Resolved: 2022-03-07

## 2022-03-07 PROBLEM — N18.9 ACUTE RENAL FAILURE SUPERIMPOSED ON CHRONIC KIDNEY DISEASE: Status: ACTIVE | Noted: 2021-11-24

## 2022-03-07 PROBLEM — N17.9 ACUTE RENAL FAILURE SUPERIMPOSED ON CHRONIC KIDNEY DISEASE: Status: RESOLVED | Noted: 2021-11-24 | Resolved: 2022-03-07

## 2022-03-07 PROBLEM — N19 ACUTE PRERENAL AZOTEMIA: Status: ACTIVE | Noted: 2022-03-07

## 2022-03-07 LAB
ALBUMIN SERPL BCP-MCNC: 4.6 G/DL (ref 3.5–5.2)
ALLENS TEST: ABNORMAL
ALP SERPL-CCNC: 129 U/L (ref 55–135)
ALT SERPL W/O P-5'-P-CCNC: 17 U/L (ref 10–44)
AMPHET+METHAMPHET UR QL: ABNORMAL
ANION GAP SERPL CALC-SCNC: 13 MMOL/L (ref 8–16)
ANION GAP SERPL CALC-SCNC: 18 MMOL/L (ref 8–16)
ANION GAP SERPL CALC-SCNC: 27 MMOL/L (ref 8–16)
ANION GAP SERPL CALC-SCNC: ABNORMAL MMOL/L (ref 8–16)
AST SERPL-CCNC: 13 U/L (ref 10–40)
B-OH-BUTYR BLD STRIP-SCNC: 7 MMOL/L (ref 0–0.5)
BACTERIA #/AREA URNS HPF: NORMAL /HPF
BARBITURATES UR QL SCN>200 NG/ML: NEGATIVE
BASOPHILS # BLD AUTO: 0.05 K/UL (ref 0–0.2)
BASOPHILS NFR BLD: 0.4 % (ref 0–1.9)
BENZODIAZ UR QL SCN>200 NG/ML: NEGATIVE
BILIRUB SERPL-MCNC: 0.5 MG/DL (ref 0.1–1)
BILIRUB UR QL STRIP: NEGATIVE
BUN SERPL-MCNC: 33 MG/DL (ref 6–20)
BUN SERPL-MCNC: 38 MG/DL (ref 6–20)
BUN SERPL-MCNC: 39 MG/DL (ref 6–20)
BUN SERPL-MCNC: 41 MG/DL (ref 6–20)
BZE UR QL SCN: NEGATIVE
CALCIUM SERPL-MCNC: 8.3 MG/DL (ref 8.7–10.5)
CALCIUM SERPL-MCNC: 8.4 MG/DL (ref 8.7–10.5)
CALCIUM SERPL-MCNC: 8.9 MG/DL (ref 8.7–10.5)
CALCIUM SERPL-MCNC: 9.6 MG/DL (ref 8.7–10.5)
CANNABINOIDS UR QL SCN: NEGATIVE
CHLORIDE SERPL-SCNC: 103 MMOL/L (ref 95–110)
CHLORIDE SERPL-SCNC: 105 MMOL/L (ref 95–110)
CHLORIDE SERPL-SCNC: 107 MMOL/L (ref 95–110)
CHLORIDE SERPL-SCNC: 95 MMOL/L (ref 95–110)
CLARITY UR: CLEAR
CO2 SERPL-SCNC: 12 MMOL/L (ref 23–29)
CO2 SERPL-SCNC: 5 MMOL/L (ref 23–29)
CO2 SERPL-SCNC: 9 MMOL/L (ref 23–29)
CO2 SERPL-SCNC: <5 MMOL/L (ref 23–29)
COLOR UR: YELLOW
CREAT SERPL-MCNC: 1.6 MG/DL (ref 0.5–1.4)
CREAT SERPL-MCNC: 1.9 MG/DL (ref 0.5–1.4)
CREAT SERPL-MCNC: 2.2 MG/DL (ref 0.5–1.4)
CREAT SERPL-MCNC: 2.6 MG/DL (ref 0.5–1.4)
CREAT UR-MCNC: 41.9 MG/DL (ref 23–375)
CTP QC/QA: YES
DELSYS: ABNORMAL
DIFFERENTIAL METHOD: ABNORMAL
EOSINOPHIL # BLD AUTO: 0 K/UL (ref 0–0.5)
EOSINOPHIL NFR BLD: 0.2 % (ref 0–8)
ERYTHROCYTE [DISTWIDTH] IN BLOOD BY AUTOMATED COUNT: 13.7 % (ref 11.5–14.5)
EST. GFR  (AFRICAN AMERICAN): 31 ML/MIN/1.73 M^2
EST. GFR  (AFRICAN AMERICAN): 38 ML/MIN/1.73 M^2
EST. GFR  (AFRICAN AMERICAN): 45 ML/MIN/1.73 M^2
EST. GFR  (AFRICAN AMERICAN): 55 ML/MIN/1.73 M^2
EST. GFR  (NON AFRICAN AMERICAN): 27 ML/MIN/1.73 M^2
EST. GFR  (NON AFRICAN AMERICAN): 33 ML/MIN/1.73 M^2
EST. GFR  (NON AFRICAN AMERICAN): 39 ML/MIN/1.73 M^2
EST. GFR  (NON AFRICAN AMERICAN): 48 ML/MIN/1.73 M^2
ESTIMATED AVG GLUCOSE: 249 MG/DL (ref 68–131)
GLUCOSE SERPL-MCNC: 196 MG/DL (ref 70–110)
GLUCOSE SERPL-MCNC: 274 MG/DL (ref 70–110)
GLUCOSE SERPL-MCNC: 514 MG/DL (ref 70–110)
GLUCOSE SERPL-MCNC: 602 MG/DL (ref 70–110)
GLUCOSE UR QL STRIP: ABNORMAL
HBA1C MFR BLD: 10.3 % (ref 4–5.6)
HCO3 UR-SCNC: 4.3 MMOL/L (ref 24–28)
HCT VFR BLD AUTO: 46.8 % (ref 40–54)
HGB BLD-MCNC: 14.2 G/DL (ref 14–18)
HGB UR QL STRIP: ABNORMAL
HYALINE CASTS #/AREA URNS LPF: 0 /LPF
IMM GRANULOCYTES # BLD AUTO: 0.09 K/UL (ref 0–0.04)
IMM GRANULOCYTES NFR BLD AUTO: 0.7 % (ref 0–0.5)
KETONES UR QL STRIP: ABNORMAL
LACTATE SERPL-SCNC: 2.7 MMOL/L (ref 0.5–2.2)
LEUKOCYTE ESTERASE UR QL STRIP: NEGATIVE
LYMPHOCYTES # BLD AUTO: 1.3 K/UL (ref 1–4.8)
LYMPHOCYTES NFR BLD: 10.1 % (ref 18–48)
MCH RBC QN AUTO: 27.7 PG (ref 27–31)
MCHC RBC AUTO-ENTMCNC: 30.3 G/DL (ref 32–36)
MCV RBC AUTO: 91 FL (ref 82–98)
METHADONE UR QL SCN>300 NG/ML: NEGATIVE
MICROSCOPIC COMMENT: NORMAL
MODE: ABNORMAL
MONOCYTES # BLD AUTO: 0.7 K/UL (ref 0.3–1)
MONOCYTES NFR BLD: 5.2 % (ref 4–15)
NEUTROPHILS # BLD AUTO: 10.5 K/UL (ref 1.8–7.7)
NEUTROPHILS NFR BLD: 83.4 % (ref 38–73)
NITRITE UR QL STRIP: NEGATIVE
NRBC BLD-RTO: 0 /100 WBC
OPIATES UR QL SCN: NEGATIVE
PCO2 BLDA: 17.1 MMHG (ref 35–45)
PCP UR QL SCN>25 NG/ML: NEGATIVE
PH SMN: 7 [PH] (ref 7.35–7.45)
PH UR STRIP: 6 [PH] (ref 5–8)
PLATELET # BLD AUTO: 509 K/UL (ref 150–450)
PMV BLD AUTO: 9.8 FL (ref 9.2–12.9)
PO2 BLDA: 71 MMHG (ref 40–60)
POC BE: -25 MMOL/L
POC SATURATED O2: 84 % (ref 95–100)
POC TCO2: <5 MMOL/L (ref 24–29)
POCT GLUCOSE: 184 MG/DL (ref 70–110)
POCT GLUCOSE: 200 MG/DL (ref 70–110)
POCT GLUCOSE: 204 MG/DL (ref 70–110)
POCT GLUCOSE: 209 MG/DL (ref 70–110)
POCT GLUCOSE: 219 MG/DL (ref 70–110)
POCT GLUCOSE: 242 MG/DL (ref 70–110)
POCT GLUCOSE: 268 MG/DL (ref 70–110)
POCT GLUCOSE: 273 MG/DL (ref 70–110)
POCT GLUCOSE: 323 MG/DL (ref 70–110)
POCT GLUCOSE: 379 MG/DL (ref 70–110)
POCT GLUCOSE: 470 MG/DL (ref 70–110)
POCT GLUCOSE: >500 MG/DL (ref 70–110)
POCT GLUCOSE: >500 MG/DL (ref 70–110)
POTASSIUM SERPL-SCNC: 4.7 MMOL/L (ref 3.5–5.1)
POTASSIUM SERPL-SCNC: 4.9 MMOL/L (ref 3.5–5.1)
POTASSIUM SERPL-SCNC: 5.5 MMOL/L (ref 3.5–5.1)
POTASSIUM SERPL-SCNC: 6.9 MMOL/L (ref 3.5–5.1)
PROT SERPL-MCNC: 10 G/DL (ref 6–8.4)
PROT UR QL STRIP: ABNORMAL
RBC # BLD AUTO: 5.13 M/UL (ref 4.6–6.2)
RBC #/AREA URNS HPF: 1 /HPF (ref 0–4)
SAMPLE: ABNORMAL
SARS-COV-2 RDRP RESP QL NAA+PROBE: NEGATIVE
SITE: ABNORMAL
SODIUM SERPL-SCNC: 127 MMOL/L (ref 136–145)
SODIUM SERPL-SCNC: 131 MMOL/L (ref 136–145)
SODIUM SERPL-SCNC: 132 MMOL/L (ref 136–145)
SODIUM SERPL-SCNC: 132 MMOL/L (ref 136–145)
SP GR UR STRIP: 1.02 (ref 1–1.03)
TOXICOLOGY INFORMATION: ABNORMAL
TROPONIN I SERPL DL<=0.01 NG/ML-MCNC: <0.006 NG/ML (ref 0–0.03)
URN SPEC COLLECT METH UR: ABNORMAL
UROBILINOGEN UR STRIP-ACNC: NEGATIVE EU/DL
WBC # BLD AUTO: 12.55 K/UL (ref 3.9–12.7)
WBC #/AREA URNS HPF: 0 /HPF (ref 0–5)
YEAST URNS QL MICRO: NORMAL

## 2022-03-07 PROCEDURE — 80048 BASIC METABOLIC PNL TOTAL CA: CPT | Mod: 91 | Performed by: STUDENT IN AN ORGANIZED HEALTH CARE EDUCATION/TRAINING PROGRAM

## 2022-03-07 PROCEDURE — 82803 BLOOD GASES ANY COMBINATION: CPT

## 2022-03-07 PROCEDURE — 94640 AIRWAY INHALATION TREATMENT: CPT

## 2022-03-07 PROCEDURE — 25000003 PHARM REV CODE 250: Performed by: STUDENT IN AN ORGANIZED HEALTH CARE EDUCATION/TRAINING PROGRAM

## 2022-03-07 PROCEDURE — 99291 CRITICAL CARE FIRST HOUR: CPT | Mod: 25

## 2022-03-07 PROCEDURE — 83036 HEMOGLOBIN GLYCOSYLATED A1C: CPT | Performed by: EMERGENCY MEDICINE

## 2022-03-07 PROCEDURE — 25000003 PHARM REV CODE 250: Performed by: EMERGENCY MEDICINE

## 2022-03-07 PROCEDURE — 93010 ELECTROCARDIOGRAM REPORT: CPT | Mod: ,,, | Performed by: INTERNAL MEDICINE

## 2022-03-07 PROCEDURE — 81000 URINALYSIS NONAUTO W/SCOPE: CPT | Mod: 59 | Performed by: EMERGENCY MEDICINE

## 2022-03-07 PROCEDURE — 99900035 HC TECH TIME PER 15 MIN (STAT)

## 2022-03-07 PROCEDURE — 20000000 HC ICU ROOM

## 2022-03-07 PROCEDURE — 63600175 PHARM REV CODE 636 W HCPCS: Performed by: STUDENT IN AN ORGANIZED HEALTH CARE EDUCATION/TRAINING PROGRAM

## 2022-03-07 PROCEDURE — 80307 DRUG TEST PRSMV CHEM ANLYZR: CPT | Performed by: STUDENT IN AN ORGANIZED HEALTH CARE EDUCATION/TRAINING PROGRAM

## 2022-03-07 PROCEDURE — 93010 EKG 12-LEAD: ICD-10-PCS | Mod: ,,, | Performed by: INTERNAL MEDICINE

## 2022-03-07 PROCEDURE — 85025 COMPLETE CBC W/AUTO DIFF WBC: CPT | Performed by: EMERGENCY MEDICINE

## 2022-03-07 PROCEDURE — 25000242 PHARM REV CODE 250 ALT 637 W/ HCPCS: Performed by: EMERGENCY MEDICINE

## 2022-03-07 PROCEDURE — 25000003 PHARM REV CODE 250: Performed by: INTERNAL MEDICINE

## 2022-03-07 PROCEDURE — 36415 COLL VENOUS BLD VENIPUNCTURE: CPT | Performed by: STUDENT IN AN ORGANIZED HEALTH CARE EDUCATION/TRAINING PROGRAM

## 2022-03-07 PROCEDURE — 94761 N-INVAS EAR/PLS OXIMETRY MLT: CPT

## 2022-03-07 PROCEDURE — 93005 ELECTROCARDIOGRAM TRACING: CPT

## 2022-03-07 PROCEDURE — 84484 ASSAY OF TROPONIN QUANT: CPT | Performed by: EMERGENCY MEDICINE

## 2022-03-07 PROCEDURE — 96360 HYDRATION IV INFUSION INIT: CPT

## 2022-03-07 PROCEDURE — 82962 GLUCOSE BLOOD TEST: CPT

## 2022-03-07 PROCEDURE — U0002 COVID-19 LAB TEST NON-CDC: HCPCS | Performed by: EMERGENCY MEDICINE

## 2022-03-07 PROCEDURE — 63600175 PHARM REV CODE 636 W HCPCS: Performed by: EMERGENCY MEDICINE

## 2022-03-07 PROCEDURE — 80053 COMPREHEN METABOLIC PANEL: CPT | Performed by: EMERGENCY MEDICINE

## 2022-03-07 PROCEDURE — 82010 KETONE BODYS QUAN: CPT | Performed by: EMERGENCY MEDICINE

## 2022-03-07 PROCEDURE — 83605 ASSAY OF LACTIC ACID: CPT | Performed by: STUDENT IN AN ORGANIZED HEALTH CARE EDUCATION/TRAINING PROGRAM

## 2022-03-07 PROCEDURE — S5010 5% DEXTROSE AND 0.45% SALINE: HCPCS | Performed by: STUDENT IN AN ORGANIZED HEALTH CARE EDUCATION/TRAINING PROGRAM

## 2022-03-07 RX ORDER — DEXMEDETOMIDINE HYDROCHLORIDE 4 UG/ML
0-1.4 INJECTION, SOLUTION INTRAVENOUS CONTINUOUS
Status: DISCONTINUED | OUTPATIENT
Start: 2022-03-07 | End: 2022-03-08

## 2022-03-07 RX ORDER — DIVALPROEX SODIUM 125 MG/1
125 TABLET, DELAYED RELEASE ORAL EVERY 8 HOURS
Status: DISCONTINUED | OUTPATIENT
Start: 2022-03-07 | End: 2022-03-09 | Stop reason: HOSPADM

## 2022-03-07 RX ORDER — DEXTROSE MONOHYDRATE AND SODIUM CHLORIDE 5; .45 G/100ML; G/100ML
125 INJECTION, SOLUTION INTRAVENOUS CONTINUOUS PRN
Status: DISCONTINUED | OUTPATIENT
Start: 2022-03-07 | End: 2022-03-08

## 2022-03-07 RX ORDER — AMLODIPINE BESYLATE 5 MG/1
10 TABLET ORAL NIGHTLY
Status: DISCONTINUED | OUTPATIENT
Start: 2022-03-07 | End: 2022-03-08

## 2022-03-07 RX ORDER — ALBUTEROL SULFATE 2.5 MG/.5ML
10 SOLUTION RESPIRATORY (INHALATION)
Status: COMPLETED | OUTPATIENT
Start: 2022-03-07 | End: 2022-03-07

## 2022-03-07 RX ORDER — NAPROXEN SODIUM 220 MG/1
81 TABLET, FILM COATED ORAL DAILY
Status: DISCONTINUED | OUTPATIENT
Start: 2022-03-07 | End: 2022-03-09 | Stop reason: HOSPADM

## 2022-03-07 RX ORDER — HEPARIN SODIUM 5000 [USP'U]/ML
5000 INJECTION, SOLUTION INTRAVENOUS; SUBCUTANEOUS EVERY 8 HOURS
Status: DISCONTINUED | OUTPATIENT
Start: 2022-03-07 | End: 2022-03-09 | Stop reason: HOSPADM

## 2022-03-07 RX ORDER — ATORVASTATIN CALCIUM 10 MG/1
20 TABLET, FILM COATED ORAL DAILY
Status: DISCONTINUED | OUTPATIENT
Start: 2022-03-07 | End: 2022-03-09 | Stop reason: HOSPADM

## 2022-03-07 RX ORDER — DOCUSATE SODIUM 100 MG/1
100 CAPSULE, LIQUID FILLED ORAL 2 TIMES DAILY PRN
Status: DISCONTINUED | OUTPATIENT
Start: 2022-03-07 | End: 2022-03-08

## 2022-03-07 RX ORDER — FAMOTIDINE 20 MG/1
20 TABLET, FILM COATED ORAL DAILY
Status: DISCONTINUED | OUTPATIENT
Start: 2022-03-07 | End: 2022-03-09 | Stop reason: HOSPADM

## 2022-03-07 RX ORDER — POLYETHYLENE GLYCOL 3350 17 G/17G
17 POWDER, FOR SOLUTION ORAL EVERY 6 HOURS PRN
Status: DISCONTINUED | OUTPATIENT
Start: 2022-03-07 | End: 2022-03-09 | Stop reason: HOSPADM

## 2022-03-07 RX ORDER — QUETIAPINE FUMARATE 25 MG/1
50 TABLET, FILM COATED ORAL NIGHTLY
Status: DISCONTINUED | OUTPATIENT
Start: 2022-03-07 | End: 2022-03-09 | Stop reason: HOSPADM

## 2022-03-07 RX ORDER — SODIUM CHLORIDE 0.9 % (FLUSH) 0.9 %
10 SYRINGE (ML) INJECTION
Status: DISCONTINUED | OUTPATIENT
Start: 2022-03-07 | End: 2022-03-09 | Stop reason: HOSPADM

## 2022-03-07 RX ORDER — SODIUM CHLORIDE 9 MG/ML
125 INJECTION, SOLUTION INTRAVENOUS CONTINUOUS
Status: DISCONTINUED | OUTPATIENT
Start: 2022-03-07 | End: 2022-03-07

## 2022-03-07 RX ORDER — HALOPERIDOL 5 MG/ML
1 INJECTION INTRAMUSCULAR EVERY 4 HOURS PRN
Status: DISCONTINUED | OUTPATIENT
Start: 2022-03-07 | End: 2022-03-08

## 2022-03-07 RX ORDER — IPRATROPIUM BROMIDE AND ALBUTEROL SULFATE 2.5; .5 MG/3ML; MG/3ML
3 SOLUTION RESPIRATORY (INHALATION) EVERY 4 HOURS PRN
Status: DISCONTINUED | OUTPATIENT
Start: 2022-03-07 | End: 2022-03-09 | Stop reason: HOSPADM

## 2022-03-07 RX ORDER — DULOXETIN HYDROCHLORIDE 30 MG/1
60 CAPSULE, DELAYED RELEASE ORAL DAILY
Status: DISCONTINUED | OUTPATIENT
Start: 2022-03-07 | End: 2022-03-09 | Stop reason: HOSPADM

## 2022-03-07 RX ORDER — ACETAMINOPHEN 325 MG/1
650 TABLET ORAL EVERY 4 HOURS PRN
Status: DISCONTINUED | OUTPATIENT
Start: 2022-03-07 | End: 2022-03-09 | Stop reason: HOSPADM

## 2022-03-07 RX ORDER — ONDANSETRON 2 MG/ML
4 INJECTION INTRAMUSCULAR; INTRAVENOUS EVERY 6 HOURS PRN
Status: DISCONTINUED | OUTPATIENT
Start: 2022-03-07 | End: 2022-03-09 | Stop reason: HOSPADM

## 2022-03-07 RX ADMIN — SODIUM BICARBONATE: 84 INJECTION, SOLUTION INTRAVENOUS at 08:03

## 2022-03-07 RX ADMIN — SODIUM CHLORIDE 125 ML/HR: 0.9 INJECTION, SOLUTION INTRAVENOUS at 01:03

## 2022-03-07 RX ADMIN — DEXTROSE AND SODIUM CHLORIDE 125 ML/HR: 5; .45 INJECTION, SOLUTION INTRAVENOUS at 06:03

## 2022-03-07 RX ADMIN — SODIUM BICARBONATE: 84 INJECTION, SOLUTION INTRAVENOUS at 02:03

## 2022-03-07 RX ADMIN — CALCIUM GLUCONATE 1 G: 98 INJECTION, SOLUTION INTRAVENOUS at 11:03

## 2022-03-07 RX ADMIN — SODIUM CHLORIDE 1000 ML: 0.9 INJECTION, SOLUTION INTRAVENOUS at 11:03

## 2022-03-07 RX ADMIN — HEPARIN SODIUM 5000 UNITS: 5000 INJECTION INTRAVENOUS; SUBCUTANEOUS at 01:03

## 2022-03-07 RX ADMIN — TRAZODONE HYDROCHLORIDE 25 MG: 50 TABLET ORAL at 09:03

## 2022-03-07 RX ADMIN — ATORVASTATIN CALCIUM 20 MG: 10 TABLET, FILM COATED ORAL at 12:03

## 2022-03-07 RX ADMIN — DULOXETINE 60 MG: 30 CAPSULE, DELAYED RELEASE ORAL at 12:03

## 2022-03-07 RX ADMIN — DEXMEDETOMIDINE HYDROCHLORIDE 0.4 MCG/KG/HR: 4 INJECTION, SOLUTION INTRAVENOUS at 12:03

## 2022-03-07 RX ADMIN — ACETAMINOPHEN 650 MG: 325 TABLET ORAL at 09:03

## 2022-03-07 RX ADMIN — DIVALPROEX SODIUM 125 MG: 125 TABLET, DELAYED RELEASE ORAL at 09:03

## 2022-03-07 RX ADMIN — QUETIAPINE FUMARATE 50 MG: 25 TABLET ORAL at 09:03

## 2022-03-07 RX ADMIN — FAMOTIDINE 20 MG: 20 TABLET ORAL at 12:03

## 2022-03-07 RX ADMIN — SODIUM CHLORIDE 1000 ML: 0.9 INJECTION, SOLUTION INTRAVENOUS at 09:03

## 2022-03-07 RX ADMIN — Medication 1 TABLET: at 01:03

## 2022-03-07 RX ADMIN — AMLODIPINE BESYLATE 10 MG: 5 TABLET ORAL at 09:03

## 2022-03-07 RX ADMIN — HEPARIN SODIUM 5000 UNITS: 5000 INJECTION INTRAVENOUS; SUBCUTANEOUS at 09:03

## 2022-03-07 RX ADMIN — DIVALPROEX SODIUM 125 MG: 125 TABLET, DELAYED RELEASE ORAL at 01:03

## 2022-03-07 RX ADMIN — ALBUTEROL SULFATE 10 MG: 2.5 SOLUTION RESPIRATORY (INHALATION) at 10:03

## 2022-03-07 RX ADMIN — ASPIRIN 81 MG CHEWABLE TABLET 81 MG: 81 TABLET CHEWABLE at 02:03

## 2022-03-07 NOTE — NURSING
Pt arrived from ED around 1235 and was flailing about in the stretcher and loudly verbalizing his necessity for water. Had to repeatedly redirect patient in order to get him settled into ICU bed. Insulin gtt infusing at 6.8 u/hr and glucose was greater than 500 upon his first readin in the ICU. Difficult to get an accurate history on patient, but attained as much information as possible amidst the outbursts and anxiety. ST on tele. IVF NS bolus infusing from bolus started in ER. Ca2+Gluc given in ED. Started a precedex drip but stopped shortly after starting. Skin intact and CHG bath given. At approx 1340, mother arrives on unit and provides Dr Zavala with fragmentary information regarding her son. Language barrier seems to contribute to inadequate responses. At approx 1415, offered patient urinal and he had 750 ml of clear yellow urine. Mentation seems to be improving and he informed me that he had gone to a fishing camp with family members prior to his admission. The trip was supposed to be one day and he said he had brought enough insulin to cover just one day. He said they didn't start catching fish until the second day, so he stayed without insulin coverage for day 2 and the next day to follow. He said all his family members know he has Type I DM but did not encourage him to go home.

## 2022-03-07 NOTE — ED PROVIDER NOTES
"Encounter Date: 3/7/2022    SCRIBE #1 NOTE: I, Adalgisa Mercedes, am scribing for, and in the presence of,  Aileen Velarde MD. I have scribed the following portions of the note - Other sections scribed: HPI, ROS, PE.       History     Chief Complaint   Patient presents with    Abdominal Pain     Pt reports NV and abdominal pain x1 day, cbg 490 w ems.       CC: Abdominal pain    HPI: This is a 56 y.o.male patient, with a PMHx of CHF, DKA, DM, HCV, HTN, Pancreatitis, and IVDU, presenting to the ED via EMS for further evaluation of abdominal pain beginning 3 days ago. Patient reports associated symptoms of nausea and vomiting. Patient states, " I can't keep anything down". Patient also states, " I am dehydrated". Patient reports he has not had his DM medications for 2 days. Per EMS personnel, patient's CBG was 490 and drank a small water bottle en route. EMS also reports patient tried to get his insulin in but failed to do so. Patient denies any fever, chills, shortness of breath, chest pain, neck pain, back pain, rash, headaches, congestion, rhinorrhea, cough, sore throat, ear pain, eye pain, blurred vision, diarrhea, dysuria, or any other associated symptoms. No alleviating or aggravating factors. No known drug allergies.      The history is provided by the patient and the EMS personnel. No  was used.     Review of patient's allergies indicates:  No Known Allergies  Past Medical History:   Diagnosis Date    Acute on chronic pancreatitis 4/3/2016    CHF (congestive heart failure)     DKA (diabetic ketoacidoses) 02/2020    DM (diabetes mellitus), type 1     HCV (hepatitis C virus)     high VL     HTN (hypertension)     Hypomagnesemia 5/25/2020    IVDU (intravenous drug user)     Heroin    Pancreatitis      Past Surgical History:   Procedure Laterality Date    CARDIAC SURGERY  1999    stent placed. ([facility])    ESOPHAGOGASTRODUODENOSCOPY N/A 3/5/2020    Gastritis.  No H pylori.  " Completed PPI for 1 month.  Procedure: EGD (ESOPHAGOGASTRODUODENOSCOPY);  Surgeon: Brinda Rene MD;  Location: [facility] ENDO;  Service: Endoscopy;  Laterality: N/A;  [id number] A; [phone]    LEFT HEART CATHETERIZATION Left 2020    Procedure: Left heart cath;  Surgeon: Fito Rangel MD;  Location: [facility] CATH LAB;  Service: Cardiology;  Laterality: Left;    SHOULDER SURGERY      right shoulder, spur removal.     Family History   Problem Relation Age of Onset    Asthma Mother      Social History     Tobacco Use    Smoking status: Current Every Day Smoker     Packs/day: 1.00     Years: 20.00     Pack years: 20.00     Types: Cigarettes     Start date: 4/3/1981     Last attempt to quit: 2020     Years since quittin.7    Smokeless tobacco: Never Used    Tobacco comment: states approx 1 per day.   Substance Use Topics    Alcohol use: Not Currently     Alcohol/week: 0.0 standard drinks    Drug use: Not Currently     Types: Heroin, IV     Review of Systems   Constitutional: Negative for chills and fever.   HENT: Negative for congestion, ear discharge, ear pain, rhinorrhea, sore throat and trouble swallowing.    Eyes: Negative for visual disturbance.   Respiratory: Negative for cough and shortness of breath.    Cardiovascular: Negative for chest pain and leg swelling.   Gastrointestinal: Positive for abdominal pain, diarrhea and nausea. Negative for vomiting.   Genitourinary: Negative for dysuria.   Musculoskeletal: Negative for back pain, neck pain and neck stiffness.   Skin: Negative for color change, rash and wound.   Neurological: Negative for seizures, syncope, speech difficulty, weakness and headaches.   Psychiatric/Behavioral: Negative for confusion.       Physical Exam     Initial Vitals [22 0907]   BP Pulse Resp Temp SpO2   (!) 141/73 (!) 112 (!) 26 97.8 °F (36.6 °C) 99 %      MAP       --         Physical Exam    Nursing note and vitals reviewed.  Constitutional: He appears well-developed.    HENT:   Head: Normocephalic.   Mouth/Throat: Mucous membranes are dry.   Eyes: Conjunctivae are normal.   Neck: Neck supple.   Cardiovascular: Regular rhythm and normal heart sounds. Tachycardia present.    Pulmonary/Chest: Breath sounds normal. Tachypnea noted. No respiratory distress. He has no wheezes.   Abdominal: He exhibits distension (mildly ).   Mildly distended abdomen with generalized tenderness in all 4 quadrants.   Musculoskeletal:         General: Normal range of motion.      Cervical back: Neck supple.     Neurological: He is alert.   Skin: Skin is warm and dry.   Psychiatric: He has a normal mood and affect.         ED Course   Critical Care    Date/Time: 3/7/2022 10:50 AM  Performed by: Aileen Velarde MD  Authorized by: Aileen Velarde MD   Direct patient critical care time: 30 minutes  Additional history critical care time: 8 minutes  Ordering / reviewing critical care time: 7 minutes  Documentation critical care time: 5 minutes  Consulting other physicians critical care time: 5 minutes  Total critical care time (exclusive of procedural time) : 55 minutes  Critical care time was exclusive of separately billable procedures and treating other patients and teaching time.  Critical care was necessary to treat or prevent imminent or life-threatening deterioration of the following conditions: dehydration, endocrine crisis and metabolic crisis.  Critical care was time spent personally by me on the following activities: blood draw for specimens, discussions with consultants, evaluation of patient's response to treatment, obtaining history from patient or surrogate, ordering and review of laboratory studies, pulse oximetry, review of old charts, development of treatment plan with patient or surrogate, discussions with primary provider, examination of patient, ordering and performing treatments and interventions, ordering and review of radiographic studies and re-evaluation of patient's condition.        Labs  Reviewed   CBC W/ AUTO DIFFERENTIAL - Abnormal; Notable for the following components:       Result Value    MCHC 30.3 (*)     Platelets 509 (*)     Immature Granulocytes 0.7 (*)     Gran # (ANC) 10.5 (*)     Immature Grans (Abs) 0.09 (*)     Gran % 83.4 (*)     Lymph % 10.1 (*)     All other components within normal limits   COMPREHENSIVE METABOLIC PANEL - Abnormal; Notable for the following components:    Sodium 127 (*)     Potassium 6.9 (*)     CO2 5 (*)     Glucose 602 (*)     BUN 41 (*)     Creatinine 2.6 (*)     Total Protein 10.0 (*)     Anion Gap 27 (*)     eGFR if  31 (*)     eGFR if non  27 (*)     All other components within normal limits    Narrative:       Potassium, CO2, Glucose critical result(s) called and verbal   readback obtained from Dr. Saleem.  by 1CD 03/07/2022 10:35   BETA - HYDROXYBUTYRATE, SERUM - Abnormal; Notable for the following components:    Beta-Hydroxybutyrate 7.0 (*)     All other components within normal limits   TROPONIN I   URINALYSIS, REFLEX TO URINE CULTURE   HEMOGLOBIN A1C   SARS-COV-2 RDRP GENE   POCT GLUCOSE MONITORING CONTINUOUS   POCT GLUCOSE MONITORING CONTINUOUS   POCT GLUCOSE MONITORING CONTINUOUS     EKG Readings: (Independently Interpreted)   Initial Reading: No STEMI. Rhythm: Sinus Tachycardia. Heart Rate: 107. Ectopy: No Ectopy. Clinical Impression: Sinus Tachycardia   QTc 453       Imaging Results          X-Ray Chest AP Portable (Final result)  Result time 03/07/22 10:45:26    Final result by Dixon Easley MD (03/07/22 10:45:26)                 Impression:      No acute cardiopulmonary disease and no significant interval change      Electronically signed by: Dixon Easley MD  Date:    03/07/2022  Time:    10:45             Narrative:    EXAMINATION:  XR CHEST AP PORTABLE    CLINICAL HISTORY:  hyperglycemia;    TECHNIQUE:  Single frontal view of the chest was performed.    COMPARISON:  12/15/2021    FINDINGS:  Heart size and pulmonary  vascularity are within normal limits.  Lungs are well expanded and appear somewhat hyperinflated.  Probable nipple shadow projected over the posterior aspect of the left 6th rib.  Mild scarring/atelectasis at the right base with chronic blunting of the right costophrenic angle again seen.  The left costophrenic angle appear sharp.  Mild accentuation of the interstitial pattern.  No superimposed acute consolidation.  Skeletal structures appear intact.                               X-Ray Abdomen AP 1 View (KUB) (Final result)  Result time 03/07/22 10:47:51    Final result by Dixon Easley MD (03/07/22 10:47:51)                 Impression:      No acute abnormality      Electronically signed by: Dixon Easley MD  Date:    03/07/2022  Time:    10:47             Narrative:    EXAMINATION:  XR ABDOMEN AP 1 VIEW    CLINICAL HISTORY:  Abdominal distension (gaseous)    TECHNIQUE:  AP View(s) of the abdomen was performed.    COMPARISON:  None    FINDINGS:  No definite calcifications overlying the renal areas.  Visualized osseous structures appear intact.  Mild curvature of the thoracolumbar spine, convex toward the left at the upper lumbar level.  Intestinal gas pattern is within normal limits.  No dilated bowel loops.  Nothing to suggest free air.  No definite evidence of organomegaly or abdominal mass lesion.                                 Medications   sodium chloride 0.9% bolus 1,000 mL (has no administration in time range)   albuterol sulfate nebulizer solution 10 mg (has no administration in time range)   calcium gluconate 1g in dextrose 5% 100mL (ready to mix system) (has no administration in time range)   dextrose 10% bolus 250 mL (has no administration in time range)   dextrose 10% bolus 125 mL (has no administration in time range)   insulin regular bolus from bag/infusion 6.8 Units (has no administration in time range)   insulin regular 1 Units/mL in sodium chloride 0.9% 100 mL infusion (has no administration in time  range)   sodium chloride 0.9% bolus 1,000 mL (1,000 mLs Intravenous New Bag 3/7/22 0950)     Medical Decision Making:   Initial Assessment:   This is an emergent evaluation of a 56-year-old man who presented to the emergency department via EMS secondary to nausea and vomiting as well as concerns for dehydration.  Differential Diagnosis:   Differential diagnosis includes DKA, small-bowel obstruction, dehydration, amongst others.  Independently Interpreted Test(s):   I have ordered and independently interpreted X-rays - see prior notes.  I have ordered and independently interpreted EKG Reading(s) - see prior notes  Clinical Tests:   Lab Tests: Ordered  Radiological Study: Ordered  Medical Tests: Ordered  ED Management:  On physical examination, patient was tachycardic, had dry mucous membranes, and had a mildly distended lower abdomen with tenderness to palpation throughout all quadrants.  Have ordered DKA labs as well as IV fluids and imaging.  Anticipate admission once these return.    Aileen Velarde MD  9:34 AM  3/7/2022     Patient's labs returned.  His metabolic panel was highly concerning for a blood glucose of 602, CO2 of 5, a potassium of 6.9, and a BUN and creatinine of 41 and 2.6.  His beta hydroxybutyrate was 7.0.  This is highly concerning for DKA.  I have started an insulin infusion as well as treatment for hyperkalemia and have admitted him to the intensive care unit.  I have discussed his care Dr. Zavala with intensive care who has agreed to admit the patient at this time.    Aileen Velarde MD  10:51 AM  3/7/2022   Other:   I have discussed this case with another health care provider.       <> Summary of the Discussion: Dr. Zavala, ICU-will admit to ICU.           Scribe Attestation:   Scribe #1: I performed the above scribed service and the documentation accurately describes the services I performed. I attest to the accuracy of the note.                 Clinical Impression:   Final  diagnoses:  [R73.9] Hyperglycemia  [R14.0] Bloated abdomen          ED Disposition Condition    Admit             I, Aileen Saleem , personally performed the services described in this documentation. All medical record entries made by the scribe were at my direction and in my presence. I have reviewed the chart and agree that the record reflects my personal performance and is accurate and complete.       Aileen Saleem MD  03/07/22 1057

## 2022-03-07 NOTE — ASSESSMENT & PLAN NOTE
Most recent left heart catheterization on record here is from September 2020  Summary:    1.  60% in stent restenosis of previously placed right coronary artery stent.  2.  Successful balloon angioplasty with a 3 0 x 20 fall by 3.5 x 20 noncompliant balloon.  60% stenosis reduced to 0%.  YENI 3 flow.  No dissection.  3.  50% stenosis noted in the mid and distal left anterior descending artery.  There is a 70 % stenosis at the apex, small caliber.    -continue home aspirin and statin

## 2022-03-07 NOTE — ASSESSMENT & PLAN NOTE
-Started amlodipine 10 mg nightly  -may benefit from switch to ACE-inhibitor for renal protection when renal function improves

## 2022-03-07 NOTE — SUBJECTIVE & OBJECTIVE
Past Medical History:   Diagnosis Date    Acute on chronic pancreatitis 4/3/2016    CHF (congestive heart failure)     DKA (diabetic ketoacidoses) 02/2020    DM (diabetes mellitus), type 1     HCV (hepatitis C virus)     high VL     HTN (hypertension)     Hypomagnesemia 5/25/2020    IVDU (intravenous drug user)     Heroin    Pancreatitis        Past Surgical History:   Procedure Laterality Date    CARDIAC SURGERY  1999    stent placed. ([facility])    ESOPHAGOGASTRODUODENOSCOPY N/A 3/5/2020    Gastritis.  No H pylori.  Completed PPI for 1 month.  Procedure: EGD (ESOPHAGOGASTRODUODENOSCOPY);  Surgeon: Brinda Rene MD;  Location: [facility] END[facility];  Service: Endoscopy;  Laterality: N/A;  [id number]; [id number]    LEFT HEART CATHETERIZATION Left 9/28/2020    Procedure: Left heart cath;  Surgeon: Fito Rangel MD;  Location: [facility] CATH LAB;  Service: Cardiology;  Laterality: Left;    SHOULDER SURGERY  2013    right shoulder, spur removal.       Review of patient's allergies indicates:  No Known Allergies    No current facility-administered medications on file prior to encounter.     Current Outpatient Medications on File Prior to Encounter   Medication Sig    acetaminophen (TYLENOL) 650 MG TbSR Take 650 mg by mouth every 8 (eight) hours as needed.    albuterol (PROVENTIL/VENTOLIN HFA) 90 mcg/actuation inhaler Inhale 2 puffs into the lungs every 4 (four) hours as needed for Wheezing or Shortness of Breath. Rescue    albuterol-ipratropium (DUO-NEB) 2.5 mg-0.5 mg/3 mL nebulizer solution Take 3 mLs by nebulization every 4 (four) hours as needed for Wheezing or Shortness of Breath. Rescue    ASCORBIC ACID, VITAMIN C, ORAL Take 1 tablet by mouth once daily.    aspirin 81 MG Chew Take 1 tablet (81 mg total) by mouth once daily.    atorvastatin (LIPITOR) 20 MG tablet Take 20 mg by mouth once daily.    b complex vitamins tablet Take 1 tablet by mouth once daily.    bisacodyL (DULCOLAX) 10 mg Supp Place 10 mg rectally daily as  needed.    blood sugar diagnostic Strp Use To check blood glucose three times daily,    blood-glucose meter Misc To check Blood glucose three times daily,    calcium carbonate (TUMS) 200 mg calcium (500 mg) chewable tablet Take 1 tablet (500 mg total) by mouth daily as needed for Heartburn.    clopidogreL (PLAVIX) 75 mg tablet Take 1 tablet (75 mg total) by mouth once daily.    diphenhydrAMINE (BENADRYL) 25 mg capsule Take 25 mg by mouth every 6 (six) hours as needed for Itching.    divalproex (DEPAKOTE) 125 MG EC tablet Take 125 mg by mouth every 8 (eight) hours.    docusate sodium (COLACE) 100 MG capsule Take 1 capsule (100 mg total) by mouth 2 (two) times daily as needed for Constipation.    DULoxetine (CYMBALTA) 60 MG capsule Take 1 capsule (60 mg total) by mouth once daily.    folic acid-vit B6-vit B12 2.5-25-2 mg (FOLBIC OR EQUIV) 2.5-25-2 mg Tab Take 1 tablet by mouth once daily.    gabapentin (NEURONTIN) 300 MG capsule Take 2 capsules (600 mg total) by mouth 3 (three) times daily.    glucagon, human recombinant, (GLUCAGEN HYPOKIT) 1 mg SolR Inject 1 mg into the muscle as needed.    glucose 4 GM chewable tablet Take 16 g by mouth as needed for Low blood sugar.    HALOPERIDOL LACTATE INJ Inject 1 mg as directed every 4 (four) hours as needed.    hydroxyzine HCL (ATARAX) 25 MG tablet Take 1 tablet (25 mg total) by mouth 3 (three) times daily as needed for Anxiety.    insulin glargine (LANTUS U-100 INSULIN) 100 unit/mL injection Inject 32 Units into the skin every evening. (Patient taking differently: Inject 42 Units into the skin every evening.)    insulin lispro 100 unit/mL injection Inject 15 Units into the skin 3 (three) times daily before meals.    ketorolac (TORADOL) 10 mg tablet Take 1 tablet (10 mg total) by mouth every 6 (six) hours as needed.    L. acidophilus/Strept/[place] (RISAQUAD ORAL) Take 1 capsule by mouth once daily.    lancets Misc To check BG 3 times daily, to use with insurance preferred  meter    LIDOcaine (LIDODERM) 5 % Place 1 patch onto the skin once daily. Remove & Discard patch within 12 hours or as directed by MD    melatonin (MELATIN) 3 mg tablet Take 2 tablets (6 mg total) by mouth nightly as needed for Insomnia.    mineral oil (FLEET OIL RETENTION) enema Place 1 enema rectally daily as needed for Constipation.    multivitamin (THERAGRAN) per tablet Take 1 tablet by mouth once daily.    naloxone (NARCAN) 4 mg/actuation Spry 4mg by nasal route as needed for opioid overdose; may repeat every 2-3 minutes in alternating nostrils until medical help arrives. Call 911    naloxone (NARCAN) 4 mg/actuation Spry 4mg by nasal route as needed for opioid overdose; may repeat every 2-3 minutes in alternating nostrils until medical help arrives. Call 911    nitroGLYCERIN (NITROSTAT) 0.4 MG SL tablet Place 1 tablet (0.4 mg total) under the tongue every 5 (five) minutes as needed for Chest pain.    ondansetron (ZOFRAN) 4 MG tablet Take 1 tablet (4 mg total) by mouth every 8 (eight) hours as needed (Nausea and vomiting).    pantoprazole (PROTONIX) 40 MG tablet Please take 1 tablet every 12 hours when you have abdominal discomfort, then take 1 tablet every day.    polyethylene glycol (GLYCOLAX) 17 gram PwPk Take 17 g by mouth every 6 (six) hours as needed (For 2 large bowel movements.  Mix packet in 8 oz of warm water).    promethazine (PHENERGAN) 25 MG suppository Place 1 suppository (25 mg total) rectally every 6 (six) hours as needed for Nausea.    QUEtiapine (SEROQUEL) 50 MG tablet Take 50 mg by mouth every evening.    traZODone (DESYREL) 50 MG tablet Take 25 mg by mouth every evening.     Family History       Problem Relation (Age of Onset)    Asthma Mother          Tobacco Use    Smoking status: Current Every Day Smoker     Packs/day: 1.00     Years: 20.00     Pack years: 20.00     Types: Cigarettes     Start date: 4/3/1981     Last attempt to quit: 2020     Years since quittin.7    Smokeless  tobacco: Never Used    Tobacco comment: states approx 1 per day.   Substance and Sexual Activity    Alcohol use: Not Currently     Alcohol/week: 0.0 standard drinks    Drug use: Not Currently     Types: Heroin, IV    Sexual activity: Yes     Partners: Female     Review of Systems   Constitutional:  Positive for activity change, appetite change and fatigue. Negative for chills and fever.   HENT:  Positive for congestion, rhinorrhea and sinus pressure. Negative for sinus pain and sore throat.    Eyes:  Negative for photophobia, pain and visual disturbance.   Respiratory:  Positive for shortness of breath. Negative for cough, chest tightness and wheezing.    Cardiovascular:  Negative for chest pain, palpitations and leg swelling.   Gastrointestinal:  Positive for abdominal pain, nausea and vomiting. Negative for abdominal distention, constipation and diarrhea.   Endocrine: Positive for polydipsia and polyuria.   Genitourinary:  Negative for dysuria, hematuria and urgency.   Musculoskeletal:  Negative for arthralgias, joint swelling and myalgias.   Skin:  Negative for color change, pallor and rash.   Neurological:  Positive for dizziness and headaches. Negative for weakness and numbness.   Hematological:  Does not bruise/bleed easily.   Psychiatric/Behavioral:  Positive for confusion and decreased concentration. Negative for behavioral problems and hallucinations.    Objective:     Vital Signs (Most Recent):  Temp: 97.5 °F (36.4 °C) (03/07/22 1236)  Pulse: (!) 112 (03/07/22 1236)  Resp: (!) 51 (03/07/22 1100)  BP: (!) 170/72 (03/07/22 1100)  SpO2: 100 % (03/07/22 1100)   Vital Signs (24h Range):  Temp:  [97.5 °F (36.4 °C)-97.8 °F (36.6 °C)] 97.5 °F (36.4 °C)  Pulse:  [110-112] 112  Resp:  [26-51] 51  SpO2:  [99 %-100 %] 100 %  BP: (141-170)/(65-73) 170/72     Weight: 68 kg (150 lb)  Body mass index is 22.14 kg/m².    Physical Exam  Vitals and nursing note reviewed.   Constitutional:       General: He is in acute  distress.      Appearance: He is well-developed. He is ill-appearing and diaphoretic.      Comments: Distracted.  Has difficulty holding conversation.  Eyes closed most of the time but easily arousable to voice.   HENT:      Head: Normocephalic and atraumatic.      Right Ear: External ear normal.      Left Ear: External ear normal.      Nose: Nose normal.   Eyes:      Conjunctiva/sclera: Conjunctivae normal.      Pupils: Pupils are equal, round, and reactive to light.   Cardiovascular:      Rate and Rhythm: Regular rhythm. Tachycardia present.   Pulmonary:      Effort: Respiratory distress present.      Breath sounds: Normal breath sounds. No wheezing or rales.      Comments: Kussmaul breathing  Abdominal:      General: Bowel sounds are normal. There is no distension.      Palpations: Abdomen is soft.      Tenderness: There is no abdominal tenderness.   Musculoskeletal:         General: No tenderness. Normal range of motion.      Cervical back: Normal range of motion and neck supple.   Skin:     General: Skin is warm.   Neurological:      Mental Status: He is disoriented.   Psychiatric:         Attention and Perception: He is inattentive.         Mood and Affect: Mood is anxious. Affect is angry.         CRANIAL NERVES     CN III, IV, VI   Pupils are equal, round, and reactive to light.     Significant Labs: All pertinent labs within the past 24 hours have been reviewed.  A1C:   Recent Labs   Lab 11/24/21  1659   HGBA1C 10.6*     ABGs:   Recent Labs   Lab 03/07/22  1058   PH 7.004*   PCO2 17.1*   HCO3 4.3*   POCSATURATED 84*   BE -25   PO2 71*     BMP:   Recent Labs   Lab 03/07/22  1302   *   *   K 5.5*      CO2 <5*   BUN 39*   CREATININE 2.2*   CALCIUM 8.9     CBC:   Recent Labs   Lab 03/07/22  0946   WBC 12.55   HGB 14.2   HCT 46.8   *       Significant Imaging: I have reviewed all pertinent imaging results/findings within the past 24 hours.

## 2022-03-07 NOTE — PHARMACY MED REC
"  Admission Medication History     The home medication history was taken by Magnolia Quinones CPhT.      You may go to "Admission" then "Reconcile Home Medications" tabs to review and/or act upon these items.      The home medication list has been updated by the Pharmacy department.    Please read ALL comments highlighted in yellow.    Please address this information as you see fit.     Feel free to contact us if you have any questions or require assistance.            Patient is unable to assess at this time due to difficulty breathing.      Magnolia Quinones CPhT.  [phone]                  .        "

## 2022-03-07 NOTE — H&P
[facility] Medicine  History & Physical    Patient Name: Cali Mabry  MRN: [id number]  Patient Class: IP- Inpatient  Admission Date: 3/7/2022  Attending Physician: Siddhartha Zavala MD   Primary Care Provider: Cullen Zimmer MD         Patient information was obtained from patient, parent and ER records.     Subjective:     Principal Problem:Diabetic ketoacidosis with coma associated with type 1 diabetes mellitus    Chief Complaint:   Chief Complaint   Patient presents with    Abdominal Pain     Pt reports NV and abdominal pain x1 day, cbg 490 w ems.          HPI: Mr. Mabry is a 56-year-old gentleman with poorly controlled type 1 diabetes mellitus, hypertension, CAD, anemia of chronic disease, chronic hepatitis-C, active tobacco use, history of polysubstance abuse including opiates.  He presented to our emergency department with 2 days of progressive nausea, vomiting, polyuria, polydipsia, abdominal pain, respiratory distress, confusion, decreased concentration.  Patient with difficulty providing details of history due to decreased alertness and respiratory distress.  Mother at bedside able to provide some additional information.  Patient reports that he may have screwed up.  Has not been giving good track of insulin administration for the past several days.  Denies fever, chills, cough, diarrhea, urinary symptoms.  No sick contacts.  Denies recent substance abuse.  Still smoking occasional cigarette.      In the emergency department he was afebrile.  Tachycardic to 110s, tachypneic with respiratory rate up to the 50s, hypertensive to 170/72.  Not hypoxic.  Glucose elevated to 602. Severely acidotic with pH on VBG of 7.0.  Beta hydroxybutyrate elevated to 7.  Lactate elevated to 2.6.  CBC with evidence of hemoconcentration, all cell lines elevated.  Found to be hyponatremic to 127, hyperkalemic to 6.9.  BUN and creatinine elevated to 41 and 2.6.  Urinalysis with 3+ ketones  and 4+ glucose but negative for infectious markers.  Chest and abdomen x-rays without acute changes.  EKG with some T-wave changes, not true peaking but may be evolving in that direction.  Patient given calcium gluconate, albuterol.  Given 3 x1 L normal saline bolus.  Given regular insulin bolus.  Started on an insulin drip and normal saline infusion.  Admitted to ICU service for further management.      Past Medical History:   Diagnosis Date    Acute on chronic pancreatitis 4/3/2016    CHF (congestive heart failure)     DKA (diabetic ketoacidoses) 02/2020    DM (diabetes mellitus), type 1     HCV (hepatitis C virus)     high VL     HTN (hypertension)     Hypomagnesemia 5/25/2020    IVDU (intravenous drug user)     Heroin    Pancreatitis        Past Surgical History:   Procedure Laterality Date    CARDIAC SURGERY  1999    stent placed. ([facility])    ESOPHAGOGASTRODUODENOSCOPY N/A 3/5/2020    Gastritis.  No H pylori.  Completed PPI for 1 month.  Procedure: EGD (ESOPHAGOGASTRODUODENOSCOPY);  Surgeon: Brinda Rene MD;  Location: [facility] ENDO;  Service: Endoscopy;  Laterality: N/A;  [id number]; [id number]    LEFT HEART CATHETERIZATION Left 9/28/2020    Procedure: Left heart cath;  Surgeon: Fito Rangel MD;  Location: [facility] CATH LAB;  Service: Cardiology;  Laterality: Left;    SHOULDER SURGERY  2013    right shoulder, spur removal.       Review of patient's allergies indicates:  No Known Allergies    No current facility-administered medications on file prior to encounter.     Current Outpatient Medications on File Prior to Encounter   Medication Sig    acetaminophen (TYLENOL) 650 MG TbSR Take 650 mg by mouth every 8 (eight) hours as needed.    albuterol (PROVENTIL/VENTOLIN HFA) 90 mcg/actuation inhaler Inhale 2 puffs into the lungs every 4 (four) hours as needed for Wheezing or Shortness of Breath. Rescue    albuterol-ipratropium (DUO-NEB) 2.5 mg-0.5 mg/3 mL nebulizer solution Take 3 mLs by  nebulization every 4 (four) hours as needed for Wheezing or Shortness of Breath. Rescue    ASCORBIC ACID, VITAMIN C, ORAL Take 1 tablet by mouth once daily.    aspirin 81 MG Chew Take 1 tablet (81 mg total) by mouth once daily.    atorvastatin (LIPITOR) 20 MG tablet Take 20 mg by mouth once daily.    b complex vitamins tablet Take 1 tablet by mouth once daily.    bisacodyL (DULCOLAX) 10 mg Supp Place 10 mg rectally daily as needed.    blood sugar diagnostic Strp Use To check blood glucose three times daily,    blood-glucose meter Misc To check Blood glucose three times daily,    calcium carbonate (TUMS) 200 mg calcium (500 mg) chewable tablet Take 1 tablet (500 mg total) by mouth daily as needed for Heartburn.    clopidogreL (PLAVIX) 75 mg tablet Take 1 tablet (75 mg total) by mouth once daily.    diphenhydrAMINE (BENADRYL) 25 mg capsule Take 25 mg by mouth every 6 (six) hours as needed for Itching.    divalproex (DEPAKOTE) 125 MG EC tablet Take 125 mg by mouth every 8 (eight) hours.    docusate sodium (COLACE) 100 MG capsule Take 1 capsule (100 mg total) by mouth 2 (two) times daily as needed for Constipation.    DULoxetine (CYMBALTA) 60 MG capsule Take 1 capsule (60 mg total) by mouth once daily.    folic acid-vit B6-vit B12 2.5-25-2 mg (FOLBIC OR EQUIV) 2.5-25-2 mg Tab Take 1 tablet by mouth once daily.    gabapentin (NEURONTIN) 300 MG capsule Take 2 capsules (600 mg total) by mouth 3 (three) times daily.    glucagon, human recombinant, (GLUCAGEN HYPOKIT) 1 mg SolR Inject 1 mg into the muscle as needed.    glucose 4 GM chewable tablet Take 16 g by mouth as needed for Low blood sugar.    HALOPERIDOL LACTATE INJ Inject 1 mg as directed every 4 (four) hours as needed.    hydroxyzine HCL (ATARAX) 25 MG tablet Take 1 tablet (25 mg total) by mouth 3 (three) times daily as needed for Anxiety.    insulin glargine (LANTUS U-100 INSULIN) 100 unit/mL injection Inject 32 Units into the skin every evening.  (Patient taking differently: Inject 42 Units into the skin every evening.)    insulin lispro 100 unit/mL injection Inject 15 Units into the skin 3 (three) times daily before meals.    ketorolac (TORADOL) 10 mg tablet Take 1 tablet (10 mg total) by mouth every 6 (six) hours as needed.    L. acidophilus/Strept/[place] p-whit[place] (RISAQUAD ORAL) Take 1 capsule by mouth once daily.    lancets Misc To check BG 3 times daily, to use with insurance preferred meter    LIDOcaine (LIDODERM) 5 % Place 1 patch onto the skin once daily. Remove & Discard patch within 12 hours or as directed by MD    melatonin (MELATIN) 3 mg tablet Take 2 tablets (6 mg total) by mouth nightly as needed for Insomnia.    mineral oil (FLEET OIL RETENTION) enema Place 1 enema rectally daily as needed for Constipation.    multivitamin (THERAGRAN) per tablet Take 1 tablet by mouth once daily.    naloxone (NARCAN) 4 mg/actuation Spry 4mg by nasal route as needed for opioid overdose; may repeat every 2-3 minutes in alternating nostrils until medical help arrives. Call 911    naloxone (NARCAN) 4 mg/actuation Spry 4mg by nasal route as needed for opioid overdose; may repeat every 2-3 minutes in alternating nostrils until medical help arrives. Call 911    nitroGLYCERIN (NITROSTAT) 0.4 MG SL tablet Place 1 tablet (0.4 mg total) under the tongue every 5 (five) minutes as needed for Chest pain.    ondansetron (ZOFRAN) 4 MG tablet Take 1 tablet (4 mg total) by mouth every 8 (eight) hours as needed (Nausea and vomiting).    pantoprazole (PROTONIX) 40 MG tablet Please take 1 tablet every 12 hours when you have abdominal discomfort, then take 1 tablet every day.    polyethylene glycol (GLYCOLAX) 17 gram PwPk Take 17 g by mouth every 6 (six) hours as needed (For 2 large bowel movements.  Mix packet in 8 oz of warm water).    promethazine (PHENERGAN) 25 MG suppository Place 1 suppository (25 mg total) rectally every 6 (six) hours as needed for Nausea.     QUEtiapine (SEROQUEL) 50 MG tablet Take 50 mg by mouth every evening.    traZODone (DESYREL) 50 MG tablet Take 25 mg by mouth every evening.     Family History       Problem Relation (Age of Onset)    Asthma Mother          Tobacco Use    Smoking status: Current Every Day Smoker     Packs/day: 1.00     Years: 20.00     Pack years: 20.00     Types: Cigarettes     Start date: 4/3/1981     Last attempt to quit: 2020     Years since quittin.7    Smokeless tobacco: Never Used    Tobacco comment: states approx 1 per day.   Substance and Sexual Activity    Alcohol use: Not Currently     Alcohol/week: 0.0 standard drinks    Drug use: Not Currently     Types: Heroin, IV    Sexual activity: Yes     Partners: Female     Review of Systems   Constitutional:  Positive for activity change, appetite change and fatigue. Negative for chills and fever.   HENT:  Positive for congestion, rhinorrhea and sinus pressure. Negative for sinus pain and sore throat.    Eyes:  Negative for photophobia, pain and visual disturbance.   Respiratory:  Positive for shortness of breath. Negative for cough, chest tightness and wheezing.    Cardiovascular:  Negative for chest pain, palpitations and leg swelling.   Gastrointestinal:  Positive for abdominal pain, nausea and vomiting. Negative for abdominal distention, constipation and diarrhea.   Endocrine: Positive for polydipsia and polyuria.   Genitourinary:  Negative for dysuria, hematuria and urgency.   Musculoskeletal:  Negative for arthralgias, joint swelling and myalgias.   Skin:  Negative for color change, pallor and rash.   Neurological:  Positive for dizziness and headaches. Negative for weakness and numbness.   Hematological:  Does not bruise/bleed easily.   Psychiatric/Behavioral:  Positive for confusion and decreased concentration. Negative for behavioral problems and hallucinations.    Objective:     Vital Signs (Most Recent):  Temp: 97.5 °F (36.4 °C) (22 1236)  Pulse:  (!) 112 (03/07/22 1236)  Resp: (!) 51 (03/07/22 1100)  BP: (!) 170/72 (03/07/22 1100)  SpO2: 100 % (03/07/22 1100)   Vital Signs (24h Range):  Temp:  [97.5 °F (36.4 °C)-97.8 °F (36.6 °C)] 97.5 °F (36.4 °C)  Pulse:  [110-112] 112  Resp:  [26-51] 51  SpO2:  [99 %-100 %] 100 %  BP: (141-170)/(65-73) 170/72     Weight: 68 kg (150 lb)  Body mass index is 22.14 kg/m².    Physical Exam  Vitals and nursing note reviewed.   Constitutional:       General: He is in acute distress.      Appearance: He is well-developed. He is ill-appearing and diaphoretic.      Comments: Distracted.  Has difficulty holding conversation.  Eyes closed most of the time but easily arousable to voice.   HENT:      Head: Normocephalic and atraumatic.      Right Ear: External ear normal.      Left Ear: External ear normal.      Nose: Nose normal.   Eyes:      Conjunctiva/sclera: Conjunctivae normal.      Pupils: Pupils are equal, round, and reactive to light.   Cardiovascular:      Rate and Rhythm: Regular rhythm. Tachycardia present.   Pulmonary:      Effort: Respiratory distress present.      Breath sounds: Normal breath sounds. No wheezing or rales.      Comments: Kussmaul breathing  Abdominal:      General: Bowel sounds are normal. There is no distension.      Palpations: Abdomen is soft.      Tenderness: There is no abdominal tenderness.   Musculoskeletal:         General: No tenderness. Normal range of motion.      Cervical back: Normal range of motion and neck supple.   Skin:     General: Skin is warm.   Neurological:      Mental Status: He is disoriented.   Psychiatric:         Attention and Perception: He is inattentive.         Mood and Affect: Mood is anxious. Affect is angry.         CRANIAL NERVES     CN III, IV, VI   Pupils are equal, round, and reactive to light.     Significant Labs: All pertinent labs within the past 24 hours have been reviewed.  A1C:   Recent Labs   Lab 11/24/21  1659   HGBA1C 10.6*     ABGs:   Recent Labs   Lab  03/07/22  1058   PH 7.004*   PCO2 17.1*   HCO3 4.3*   POCSATURATED 84*   BE -25   PO2 71*     BMP:   Recent Labs   Lab 03/07/22  1302   *   *   K 5.5*      CO2 <5*   BUN 39*   CREATININE 2.2*   CALCIUM 8.9     CBC:   Recent Labs   Lab 03/07/22  0946   WBC 12.55   HGB 14.2   HCT 46.8   *       Significant Imaging: I have reviewed all pertinent imaging results/findings within the past 24 hours.    Assessment/Plan:     * Diabetic ketoacidosis with coma associated with type 1 diabetes mellitus   Patient with severe anion gap metabolic acidosis, hyperglycemia, elevated ketones  Patient with recurrent admissions for this issue.  Mother is concerned that he may have underlying cognitive deficits that interfere with his adherence to insulin regimen.  History of substance abuse in the past, unclear if this is contributing.  -Does not appear to have infectious component or other cause precipitating increased insulin demand  -continue insulin drip  -was previously on normal saline infusion.  Switched to bicarb drip due to severely depressed bicarb  -BMP q.4 hours.    -will need to clarify home insulin regimen prior to switching to basal bolus regimen.  Patient currently having difficulty reporting details of home medications.  Mother uncertain.  -A1c is been poorly controlled for the past couple of years.  Most recent A1c 10.6%.  Will need to tease out social issues related to for control.  Patient and mother likely to need extensive counseling.  -follow-up repeat A1c    Acute prerenal azotemia  -secondary to hypovolemia in the setting of DKA.  Creatinine 2.6 on admission from baseline around 1.2.  -unclear if some additional component of intrinsic renal injury  -monitor BMP  -renally dose medications, avoid nephrotoxins      Essential hypertension  -Started amlodipine 10 mg nightly  -may benefit from switch to ACE-inhibitor for renal protection when renal function improves      CAD (coronary artery  disease)  Most recent left heart catheterization on record here is from September 2020  Summary:    1.  60% in stent restenosis of previously placed right coronary artery stent.  2.  Successful balloon angioplasty with a 3 0 x 20 fall by 3.5 x 20 noncompliant balloon.  60% stenosis reduced to 0%.  YEIN 3 flow.  No dissection.  3.  50% stenosis noted in the mid and distal left anterior descending artery.  There is a 70 % stenosis at the apex, small caliber.    -continue home aspirin and statin      Chronic hepatitis C  -Patient with active hepatitis-C as recently as a year ago, no apparent hepatic dysfunction at this time based on LFTs  -mother is uncertain if he has sought treatment for this.  Will need referral at discharge to ID or hepatology      YONAS (generalized anxiety disorder)   Continue home Depakote, Seroquel, trazodone      Opioid abuse  -appears that was previously on methadone.  Methadone negative on most recent urine drug screen.  -follow-up urine drug screen          VTE Risk Mitigation (From admission, onward)         Ordered     heparin (porcine) injection 5,000 Units  Every 8 hours         03/07/22 1212     IP VTE LOW RISK PATIENT  Once         03/07/22 1212              Critical care time spent on the evaluation and treatment of severe organ dysfunction, review of pertinent labs and imaging studies, discussions with consulting providers and discussions with patient/family: 50 minutes.     Siddhartha Zavala MD  Department of Hospital Medicine   [facility] - Intensive Care

## 2022-03-07 NOTE — HPI
Mr. Mabry is a 56-year-old gentleman with poorly controlled type 1 diabetes mellitus, hypertension, CAD, anemia of chronic disease, chronic hepatitis-C, active tobacco use, history of polysubstance abuse including opiates.  He presented to our emergency department with 2 days of progressive nausea, vomiting, polyuria, polydipsia, abdominal pain, respiratory distress, confusion, decreased concentration.  Patient with difficulty providing details of history due to decreased alertness and respiratory distress.  Mother at bedside able to provide some additional information.  Patient reports that he may have screwed up.  Has not been giving good track of insulin administration for the past several days.  Denies fever, chills, cough, diarrhea, urinary symptoms.  No sick contacts.  Denies recent substance abuse.  Still smoking occasional cigarette.      In the emergency department he was afebrile.  Tachycardic to 110s, tachypneic with respiratory rate up to the 50s, hypertensive to 170/72.  Not hypoxic.  Glucose elevated to 602. Severely acidotic with pH on VBG of 7.0.  Beta hydroxybutyrate elevated to 7.  Lactate elevated to 2.6.  CBC with evidence of hemoconcentration, all cell lines elevated.  Found to be hyponatremic to 127, hyperkalemic to 6.9.  BUN and creatinine elevated to 41 and 2.6.  Urinalysis with 3+ ketones and 4+ glucose but negative for infectious markers.  Chest and abdomen x-rays without acute changes.  EKG with some T-wave changes, not true peaking but may be evolving in that direction.  Patient given calcium gluconate, albuterol.  Given 3 x1 L normal saline bolus.  Given regular insulin bolus.  Started on an insulin drip and normal saline infusion.  Admitted to ICU service for further management.    - Siddhartha Zavala MD

## 2022-03-07 NOTE — ASSESSMENT & PLAN NOTE
-appears that was previously on methadone.  Methadone negative on most recent urine drug screen.  -follow-up urine drug screen

## 2022-03-07 NOTE — ASSESSMENT & PLAN NOTE
Patient with severe anion gap metabolic acidosis, hyperglycemia, elevated ketones  Patient with recurrent admissions for this issue.  Mother is concerned that he may have underlying cognitive deficits that interfere with his adherence to insulin regimen.  History of substance abuse in the past, unclear if this is contributing.  -Does not appear to have infectious component or other cause precipitating increased insulin demand  -continue insulin drip  -was previously on normal saline infusion.  Switched to bicarb drip due to severely depressed bicarb  -BMP q.4 hours.    -will need to clarify home insulin regimen prior to switching to basal bolus regimen.  Patient currently having difficulty reporting details of home medications.  Mother uncertain.  -A1c is been poorly controlled for the past couple of years.  Most recent A1c 10.6%.  Will need to tease out social issues related to for control.  Patient and mother likely to need extensive counseling.  -follow-up repeat A1c

## 2022-03-07 NOTE — ED NOTES
Unable to obtain second IV after x 2 attempts to start insulin infusion. Another RNto attempt access.

## 2022-03-07 NOTE — ASSESSMENT & PLAN NOTE
-Patient with active hepatitis-C as recently as a year ago, no apparent hepatic dysfunction at this time based on LFTs  -mother is uncertain if he has sought treatment for this.  Will need referral at discharge to ID or hepatology

## 2022-03-07 NOTE — ASSESSMENT & PLAN NOTE
-secondary to hypovolemia in the setting of DKA.  Creatinine 2.6 on admission from baseline around 1.2.  -unclear if some additional component of intrinsic renal injury  -monitor BMP  -renally dose medications, avoid nephrotoxins

## 2022-03-08 PROBLEM — N17.9 AKI (ACUTE KIDNEY INJURY): Status: ACTIVE | Noted: 2022-03-07

## 2022-03-08 PROBLEM — R06.02 SOB (SHORTNESS OF BREATH): Status: RESOLVED | Noted: 2020-09-28 | Resolved: 2022-03-08

## 2022-03-08 PROBLEM — E87.6 HYPOKALEMIA: Status: ACTIVE | Noted: 2022-03-08

## 2022-03-08 PROBLEM — E83.39 HYPOPHOSPHATEMIA: Status: ACTIVE | Noted: 2022-03-08

## 2022-03-08 LAB
ANION GAP SERPL CALC-SCNC: 6 MMOL/L (ref 8–16)
ANION GAP SERPL CALC-SCNC: 9 MMOL/L (ref 8–16)
BASOPHILS # BLD AUTO: 0.01 K/UL (ref 0–0.2)
BASOPHILS NFR BLD: 0.2 % (ref 0–1.9)
BUN SERPL-MCNC: 24 MG/DL (ref 6–20)
BUN SERPL-MCNC: 29 MG/DL (ref 6–20)
CALCIUM SERPL-MCNC: 7.7 MG/DL (ref 8.7–10.5)
CALCIUM SERPL-MCNC: 8 MG/DL (ref 8.7–10.5)
CHLORIDE SERPL-SCNC: 103 MMOL/L (ref 95–110)
CHLORIDE SERPL-SCNC: 104 MMOL/L (ref 95–110)
CO2 SERPL-SCNC: 20 MMOL/L (ref 23–29)
CO2 SERPL-SCNC: 24 MMOL/L (ref 23–29)
CREAT SERPL-MCNC: 1.3 MG/DL (ref 0.5–1.4)
CREAT SERPL-MCNC: 1.4 MG/DL (ref 0.5–1.4)
DIFFERENTIAL METHOD: ABNORMAL
EOSINOPHIL # BLD AUTO: 0.1 K/UL (ref 0–0.5)
EOSINOPHIL NFR BLD: 1.6 % (ref 0–8)
ERYTHROCYTE [DISTWIDTH] IN BLOOD BY AUTOMATED COUNT: 13.6 % (ref 11.5–14.5)
EST. GFR  (AFRICAN AMERICAN): >60 ML/MIN/1.73 M^2
EST. GFR  (AFRICAN AMERICAN): >60 ML/MIN/1.73 M^2
EST. GFR  (NON AFRICAN AMERICAN): 56 ML/MIN/1.73 M^2
EST. GFR  (NON AFRICAN AMERICAN): >60 ML/MIN/1.73 M^2
GLUCOSE SERPL-MCNC: 246 MG/DL (ref 70–110)
GLUCOSE SERPL-MCNC: 262 MG/DL (ref 70–110)
HCT VFR BLD AUTO: 31 % (ref 40–54)
HGB BLD-MCNC: 10.3 G/DL (ref 14–18)
IMM GRANULOCYTES # BLD AUTO: 0.03 K/UL (ref 0–0.04)
IMM GRANULOCYTES NFR BLD AUTO: 0.5 % (ref 0–0.5)
LYMPHOCYTES # BLD AUTO: 1.2 K/UL (ref 1–4.8)
LYMPHOCYTES NFR BLD: 18.4 % (ref 18–48)
MAGNESIUM SERPL-MCNC: 1.9 MG/DL (ref 1.6–2.6)
MCH RBC QN AUTO: 27.9 PG (ref 27–31)
MCHC RBC AUTO-ENTMCNC: 33.2 G/DL (ref 32–36)
MCV RBC AUTO: 84 FL (ref 82–98)
MONOCYTES # BLD AUTO: 0.7 K/UL (ref 0.3–1)
MONOCYTES NFR BLD: 11.4 % (ref 4–15)
NEUTROPHILS # BLD AUTO: 4.4 K/UL (ref 1.8–7.7)
NEUTROPHILS NFR BLD: 67.9 % (ref 38–73)
NRBC BLD-RTO: 0 /100 WBC
PHOSPHATE SERPL-MCNC: 1.6 MG/DL (ref 2.7–4.5)
PLATELET # BLD AUTO: 272 K/UL (ref 150–450)
PLATELET BLD QL SMEAR: ABNORMAL
PMV BLD AUTO: 8.9 FL (ref 9.2–12.9)
POCT GLUCOSE: 149 MG/DL (ref 70–110)
POCT GLUCOSE: 184 MG/DL (ref 70–110)
POCT GLUCOSE: 198 MG/DL (ref 70–110)
POCT GLUCOSE: 233 MG/DL (ref 70–110)
POCT GLUCOSE: 245 MG/DL (ref 70–110)
POCT GLUCOSE: 257 MG/DL (ref 70–110)
POCT GLUCOSE: 259 MG/DL (ref 70–110)
POCT GLUCOSE: 268 MG/DL (ref 70–110)
POCT GLUCOSE: 274 MG/DL (ref 70–110)
POCT GLUCOSE: 371 MG/DL (ref 70–110)
POCT GLUCOSE: 387 MG/DL (ref 70–110)
POTASSIUM SERPL-SCNC: 3.1 MMOL/L (ref 3.5–5.1)
POTASSIUM SERPL-SCNC: 3.7 MMOL/L (ref 3.5–5.1)
RBC # BLD AUTO: 3.69 M/UL (ref 4.6–6.2)
SODIUM SERPL-SCNC: 133 MMOL/L (ref 136–145)
SODIUM SERPL-SCNC: 133 MMOL/L (ref 136–145)
WBC # BLD AUTO: 6.4 K/UL (ref 3.9–12.7)

## 2022-03-08 PROCEDURE — 25000003 PHARM REV CODE 250: Performed by: STUDENT IN AN ORGANIZED HEALTH CARE EDUCATION/TRAINING PROGRAM

## 2022-03-08 PROCEDURE — 84100 ASSAY OF PHOSPHORUS: CPT | Performed by: STUDENT IN AN ORGANIZED HEALTH CARE EDUCATION/TRAINING PROGRAM

## 2022-03-08 PROCEDURE — 25000003 PHARM REV CODE 250: Performed by: HOSPITALIST

## 2022-03-08 PROCEDURE — 63600175 PHARM REV CODE 636 W HCPCS: Performed by: INTERNAL MEDICINE

## 2022-03-08 PROCEDURE — 99900035 HC TECH TIME PER 15 MIN (STAT)

## 2022-03-08 PROCEDURE — 85025 COMPLETE CBC W/AUTO DIFF WBC: CPT | Performed by: STUDENT IN AN ORGANIZED HEALTH CARE EDUCATION/TRAINING PROGRAM

## 2022-03-08 PROCEDURE — 80048 BASIC METABOLIC PNL TOTAL CA: CPT | Mod: 91 | Performed by: STUDENT IN AN ORGANIZED HEALTH CARE EDUCATION/TRAINING PROGRAM

## 2022-03-08 PROCEDURE — 94761 N-INVAS EAR/PLS OXIMETRY MLT: CPT

## 2022-03-08 PROCEDURE — 83735 ASSAY OF MAGNESIUM: CPT | Performed by: STUDENT IN AN ORGANIZED HEALTH CARE EDUCATION/TRAINING PROGRAM

## 2022-03-08 PROCEDURE — 63600175 PHARM REV CODE 636 W HCPCS: Performed by: HOSPITALIST

## 2022-03-08 PROCEDURE — S5010 5% DEXTROSE AND 0.45% SALINE: HCPCS | Performed by: STUDENT IN AN ORGANIZED HEALTH CARE EDUCATION/TRAINING PROGRAM

## 2022-03-08 PROCEDURE — C9399 UNCLASSIFIED DRUGS OR BIOLOG: HCPCS | Performed by: HOSPITALIST

## 2022-03-08 PROCEDURE — 20000000 HC ICU ROOM

## 2022-03-08 PROCEDURE — 25000003 PHARM REV CODE 250: Performed by: EMERGENCY MEDICINE

## 2022-03-08 PROCEDURE — 25000003 PHARM REV CODE 250: Performed by: INTERNAL MEDICINE

## 2022-03-08 PROCEDURE — 36415 COLL VENOUS BLD VENIPUNCTURE: CPT | Performed by: STUDENT IN AN ORGANIZED HEALTH CARE EDUCATION/TRAINING PROGRAM

## 2022-03-08 PROCEDURE — 63600175 PHARM REV CODE 636 W HCPCS: Performed by: EMERGENCY MEDICINE

## 2022-03-08 PROCEDURE — 63600175 PHARM REV CODE 636 W HCPCS: Performed by: STUDENT IN AN ORGANIZED HEALTH CARE EDUCATION/TRAINING PROGRAM

## 2022-03-08 PROCEDURE — 80048 BASIC METABOLIC PNL TOTAL CA: CPT | Performed by: STUDENT IN AN ORGANIZED HEALTH CARE EDUCATION/TRAINING PROGRAM

## 2022-03-08 RX ORDER — GLUCAGON 1 MG
1 KIT INJECTION
Status: DISCONTINUED | OUTPATIENT
Start: 2022-03-09 | End: 2022-03-09 | Stop reason: HOSPADM

## 2022-03-08 RX ORDER — IBUPROFEN 200 MG
16 TABLET ORAL
Status: DISCONTINUED | OUTPATIENT
Start: 2022-03-09 | End: 2022-03-09 | Stop reason: HOSPADM

## 2022-03-08 RX ORDER — POTASSIUM CHLORIDE 20 MEQ/1
20 TABLET, EXTENDED RELEASE ORAL
Status: DISCONTINUED | OUTPATIENT
Start: 2022-03-08 | End: 2022-03-09

## 2022-03-08 RX ORDER — SODIUM,POTASSIUM PHOSPHATES 280-250MG
1 POWDER IN PACKET (EA) ORAL EVERY 4 HOURS PRN
Status: DISCONTINUED | OUTPATIENT
Start: 2022-03-08 | End: 2022-03-09

## 2022-03-08 RX ORDER — SODIUM,POTASSIUM PHOSPHATES 280-250MG
2 POWDER IN PACKET (EA) ORAL EVERY 4 HOURS PRN
Status: DISCONTINUED | OUTPATIENT
Start: 2022-03-08 | End: 2022-03-09

## 2022-03-08 RX ORDER — LANOLIN ALCOHOL/MO/W.PET/CERES
400 CREAM (GRAM) TOPICAL EVERY 4 HOURS PRN
Status: DISCONTINUED | OUTPATIENT
Start: 2022-03-08 | End: 2022-03-09

## 2022-03-08 RX ORDER — TALC
6 POWDER (GRAM) TOPICAL NIGHTLY PRN
Status: DISCONTINUED | OUTPATIENT
Start: 2022-03-08 | End: 2022-03-09 | Stop reason: HOSPADM

## 2022-03-08 RX ORDER — IBUPROFEN 200 MG
24 TABLET ORAL
Status: DISCONTINUED | OUTPATIENT
Start: 2022-03-09 | End: 2022-03-09 | Stop reason: HOSPADM

## 2022-03-08 RX ORDER — INSULIN ASPART 100 [IU]/ML
0-5 INJECTION, SOLUTION INTRAVENOUS; SUBCUTANEOUS
Status: DISCONTINUED | OUTPATIENT
Start: 2022-03-08 | End: 2022-03-09 | Stop reason: HOSPADM

## 2022-03-08 RX ORDER — LANOLIN ALCOHOL/MO/W.PET/CERES
800 CREAM (GRAM) TOPICAL EVERY 4 HOURS PRN
Status: DISCONTINUED | OUTPATIENT
Start: 2022-03-08 | End: 2022-03-09

## 2022-03-08 RX ORDER — MAGNESIUM SULFATE 1 G/100ML
1 INJECTION INTRAVENOUS ONCE
Status: COMPLETED | OUTPATIENT
Start: 2022-03-08 | End: 2022-03-08

## 2022-03-08 RX ORDER — INSULIN ASPART 100 [IU]/ML
15 INJECTION, SOLUTION INTRAVENOUS; SUBCUTANEOUS
Status: DISCONTINUED | OUTPATIENT
Start: 2022-03-09 | End: 2022-03-09 | Stop reason: HOSPADM

## 2022-03-08 RX ADMIN — DIVALPROEX SODIUM 125 MG: 125 TABLET, DELAYED RELEASE ORAL at 02:03

## 2022-03-08 RX ADMIN — POTASSIUM PHOSPHATE, MONOBASIC AND POTASSIUM PHOSPHATE, DIBASIC 30 MMOL: 224; 236 INJECTION, SOLUTION, CONCENTRATE INTRAVENOUS at 08:03

## 2022-03-08 RX ADMIN — FAMOTIDINE 20 MG: 20 TABLET ORAL at 08:03

## 2022-03-08 RX ADMIN — Medication 1 TABLET: at 08:03

## 2022-03-08 RX ADMIN — DULOXETINE 60 MG: 30 CAPSULE, DELAYED RELEASE ORAL at 08:03

## 2022-03-08 RX ADMIN — MAGNESIUM SULFATE 1 G: 1 INJECTION INTRAVENOUS at 06:03

## 2022-03-08 RX ADMIN — INSULIN ASPART 5 UNITS: 100 INJECTION, SOLUTION INTRAVENOUS; SUBCUTANEOUS at 04:03

## 2022-03-08 RX ADMIN — HEPARIN SODIUM 5000 UNITS: 5000 INJECTION INTRAVENOUS; SUBCUTANEOUS at 02:03

## 2022-03-08 RX ADMIN — INSULIN DETEMIR 20 UNITS: 100 INJECTION, SOLUTION SUBCUTANEOUS at 11:03

## 2022-03-08 RX ADMIN — DIVALPROEX SODIUM 125 MG: 125 TABLET, DELAYED RELEASE ORAL at 05:03

## 2022-03-08 RX ADMIN — INSULIN ASPART 5 UNITS: 100 INJECTION, SOLUTION INTRAVENOUS; SUBCUTANEOUS at 11:03

## 2022-03-08 RX ADMIN — DEXTROSE AND SODIUM CHLORIDE 125 ML/HR: 5; .45 INJECTION, SOLUTION INTRAVENOUS at 02:03

## 2022-03-08 RX ADMIN — TRAZODONE HYDROCHLORIDE 25 MG: 50 TABLET ORAL at 09:03

## 2022-03-08 RX ADMIN — HEPARIN SODIUM 5000 UNITS: 5000 INJECTION INTRAVENOUS; SUBCUTANEOUS at 05:03

## 2022-03-08 RX ADMIN — SODIUM CHLORIDE 2 UNITS/HR: 9 INJECTION, SOLUTION INTRAVENOUS at 05:03

## 2022-03-08 RX ADMIN — ATORVASTATIN CALCIUM 20 MG: 10 TABLET, FILM COATED ORAL at 08:03

## 2022-03-08 RX ADMIN — ASPIRIN 81 MG CHEWABLE TABLET 81 MG: 81 TABLET CHEWABLE at 08:03

## 2022-03-08 RX ADMIN — QUETIAPINE FUMARATE 50 MG: 25 TABLET ORAL at 09:03

## 2022-03-08 RX ADMIN — INSULIN ASPART 2 UNITS: 100 INJECTION, SOLUTION INTRAVENOUS; SUBCUTANEOUS at 10:03

## 2022-03-08 NOTE — ASSESSMENT & PLAN NOTE
-appears that was previously on methadone.  Methadone negative on most recent urine drug screen.  -UDS amphetamine positive on admit

## 2022-03-08 NOTE — PLAN OF CARE
In ICU on insulin gtt per protocol. Pt aao4 with intermittent confusion. Drowsy but easily arousal. Room air. 2piv sites clean dry and intact.with  IVF, insulin,  AND HCO3 gtt infusing. Tolerating clear liquid diet. Voiding per urinal with assistance. Safety maintained and remains free of injury for shift.

## 2022-03-08 NOTE — PROVIDER TRANSFER
Mr Cali Mabry is a 56 y.o. man with T1DM admitted with DKA. Resolved with bicarb gtt and insulin gtt. Transitioned to subQ insulin.     TO do   - watch blood sugar  - PT, OT recs    Karissa Lugo MD  03/08/2022  11:08 AM

## 2022-03-08 NOTE — PROGRESS NOTES
[facility]  [facility] Medicine  Progress Note    Patient Name: Cali Mabry  MRN: [id number]  Patient Class: IP- Inpatient   Admission Date: 3/7/2022  Length of Stay: 1 days  Attending Physician: Karissa Lugo MD  Primary Care Provider: Cullen Zimmer MD        Subjective:     Principal Problem:Diabetic ketoacidosis with coma associated with type 1 diabetes mellitus        HPI:  Mr. Mabry is a 56-year-old gentleman with poorly controlled type 1 diabetes mellitus, hypertension, CAD, anemia of chronic disease, chronic hepatitis-C, active tobacco use, history of polysubstance abuse including opiates.  He presented to our emergency department with 2 days of progressive nausea, vomiting, polyuria, polydipsia, abdominal pain, respiratory distress, confusion, decreased concentration.  Patient with difficulty providing details of history due to decreased alertness and respiratory distress.  Mother at bedside able to provide some additional information.  Patient reports that he may have screwed up.  Has not been giving good track of insulin administration for the past several days.  Denies fever, chills, cough, diarrhea, urinary symptoms.  No sick contacts.  Denies recent substance abuse.  Still smoking occasional cigarette.      In the emergency department he was afebrile.  Tachycardic to 110s, tachypneic with respiratory rate up to the 50s, hypertensive to 170/72.  Not hypoxic.  Glucose elevated to 602. Severely acidotic with pH on VBG of 7.0.  Beta hydroxybutyrate elevated to 7.  Lactate elevated to 2.6.  CBC with evidence of hemoconcentration, all cell lines elevated.  Found to be hyponatremic to 127, hyperkalemic to 6.9.  BUN and creatinine elevated to 41 and 2.6.  Urinalysis with 3+ ketones and 4+ glucose but negative for infectious markers.  Chest and abdomen x-rays without acute changes.  EKG with some T-wave changes, not true peaking but may be evolving in that direction.  Patient given  "calcium gluconate, albuterol.  Given 3 x1 L normal saline bolus.  Given regular insulin bolus.  Started on an insulin drip and normal saline infusion.  Admitted to ICU service for further management.      Overview/Hospital Course:  Mr Cali Mabry is a 56 y.o. man admitted with DKA. Started bicarb gtt, insulin gtt on admission. DKA resolved. Transitioned to subQ insulin regimen. Patient states he takes 20U lantus + variable amount aspart with meals.       Interval History: Feeling much better today. Says he is "tired."    Review of Systems   Constitutional:  Negative for chills and fever.   Respiratory:  Negative for shortness of breath.    Cardiovascular:  Negative for chest pain.   Gastrointestinal:  Negative for abdominal pain.   Genitourinary:  Negative for difficulty urinating.   Neurological:  Negative for weakness and numbness.   Psychiatric/Behavioral:  Negative for confusion.    Objective:     Vital Signs (Most Recent):  Temp: 97.7 °F (36.5 °C) (03/08/22 0715)  Pulse: 87 (03/08/22 0900)  Resp: (!) 21 (03/08/22 0900)  BP: (!) 109/59 (03/08/22 0900)  SpO2: 98 % (03/08/22 0900)   Vital Signs (24h Range):  Temp:  [97.5 °F (36.4 °C)-98.6 °F (37 °C)] 97.7 °F (36.5 °C)  Pulse:  [] 87  Resp:  [16-48] 21  SpO2:  [80 %-100 %] 98 %  BP: ()/(44-86) 109/59     Weight: 68 kg (150 lb)  Body mass index is 22.15 kg/m².    Intake/Output Summary (Last 24 hours) at 3/8/2022 1100  Last data filed at 3/8/2022 0838  Gross per 24 hour   Intake 7153.14 ml   Output 2600 ml   Net 4553.14 ml      Physical Exam  Vitals and nursing note reviewed.   Constitutional:       General: He is not in acute distress.     Appearance: He is ill-appearing. He is not toxic-appearing.   HENT:      Head: Normocephalic and atraumatic.      Nose: Nose normal.      Mouth/Throat:      Mouth: Mucous membranes are moist.   Cardiovascular:      Rate and Rhythm: Normal rate and regular rhythm.      Pulses: Normal pulses.      Heart sounds: " Normal heart sounds. No murmur heard.    No gallop.   Pulmonary:      Effort: Pulmonary effort is normal. No respiratory distress.      Breath sounds: Normal breath sounds. No wheezing or rales.      Comments: Room air  Abdominal:      General: Bowel sounds are normal. There is no distension.      Palpations: Abdomen is soft.      Tenderness: There is no abdominal tenderness. There is no guarding.   Musculoskeletal:      Right lower leg: No edema.      Left lower leg: No edema.   Skin:     General: Skin is warm and dry.   Neurological:      Mental Status: He is alert and oriented to person, place, and time.       Significant Labs: All pertinent labs within the past 24 hours have been reviewed.    Significant Imaging: I have reviewed all pertinent imaging results/findings within the past 24 hours.      Assessment/Plan:      * Diabetic ketoacidosis with coma associated with type 1 diabetes mellitus  Admitted with DKA. This resolved with bicarb gtt, insulin gtt.   Transition to subQ insulin- patient states he takes 20U lantus (though 42U is prescribed) and a variable amount of aspart during the day    A1c:   Lab Results   Component Value Date    HGBA1C 10.3 (H) 03/07/2022     Meds: detemir + aspart insulin + SSI PRN to maintain goal 140-180  ADA diet, accuchecks, hypoglycemic protocol      Hypophosphatemia  Replace and monitor        Hypokalemia  Replace and monitor        RAMYA (acute kidney injury)  -secondary to hypovolemia in the setting of DKA.  Creatinine 2.6 on admission from baseline around 1.2.  -improved with IVF      CAD (coronary artery disease)  Most recent left heart catheterization on record here is from September 2020  Summary:    1.  60% in stent restenosis of previously placed right coronary artery stent.  2.  Successful balloon angioplasty with a 3 0 x 20 fall by 3.5 x 20 noncompliant balloon.  60% stenosis reduced to 0%.  YENI 3 flow.  No dissection.  3.  50% stenosis noted in the mid and distal left  anterior descending artery.  There is a 70 % stenosis at the apex, small caliber.    -continue home aspirin and statin      YONAS (generalized anxiety disorder)   Continue home Depakote, Seroquel, trazodone      Opioid abuse  -appears that was previously on methadone.  Methadone negative on most recent urine drug screen.  -UDS amphetamine positive on admit       Chronic hepatitis C  -Patient with active hepatitis-C as recently as a year ago, no apparent hepatic dysfunction at this time based on LFTs  -mother is uncertain if he has sought treatment for this.  Will need referral at discharge to ID or hepatology      Anemia of chronic disease  No bleeding noted. Does not need further inpatient monitoring       Essential hypertension  BP is borderline low  Hold BP Rx and monitor         VTE Risk Mitigation (From admission, onward)         Ordered     heparin (porcine) injection 5,000 Units  Every 8 hours         03/07/22 1212     IP VTE LOW RISK PATIENT  Once         03/07/22 1212                Discharge Planning   ROLA: 3/9/2022     Code Status: Full Code   Is the patient medically ready for discharge?:     Reason for patient still in hospital (select all that apply): Patient trending condition               Karissa Lugo MD  Department of Hospital Medicine   [facility] - Intensive Care

## 2022-03-08 NOTE — ASSESSMENT & PLAN NOTE
-secondary to hypovolemia in the setting of DKA.  Creatinine 2.6 on admission from baseline around 1.2.  -improved with IVF

## 2022-03-08 NOTE — HOSPITAL COURSE
Mr Cali Mabry is a 56 y.o. man admitted with DKA. Started bicarb gtt, insulin gtt on admission. DKA resolved. Transitioned to subQ insulin regimen. Patient states he takes 20U lantus + variable amount aspart with meals. But then wasn't sure. Also states he ran out of needles and needs refills. Per W records, is on 42 U of lantus nightly and 10 U of novolog with meals. Is supposed to have refills till July of this year. For safety, I have sent new Rx to our pharmacy to provide prior to discharge. Discussed with patient at bedside. Discharged in stable condition. F/u with PCP.

## 2022-03-08 NOTE — PLAN OF CARE
[facility] - Intensive Care  Initial Discharge Assessment       Primary Care Provider: Cullen Zimmer MD    Admission Diagnosis: Bloated abdomen [R14.0]  Hyperglycemia [R73.9]    Admission Date: 3/7/2022  Expected Discharge Date: 3/9/2022    Discharge Barriers Identified: None    Payor: [facility] MCARE / Plan: [facility] DUAL COMPLETE HMO SNP / Product Type: Medicare Advantage /     Extended Emergency Contact Information  Primary Emergency Contact: Amarilis Easley   [place]  Mobile Phone: [phone]  Relation: Sister  Secondary Emergency Contact: reginamichaelMarilou holbrook  Mobile Phone: [phone]  Relation: Mother  Preferred language: English   needed? No    Discharge Plan A: Home  Discharge Plan B: Home      [id number] [place] - [address]  [address] [address]  Phone: [phone] Fax: [phone]      Initial Assessment (most recent)       Adult Discharge Assessment - 03/08/22 1520          Discharge Assessment    Assessment Type Discharge Planning Assessment     Confirmed/corrected address, phone number and insurance Yes     Confirmed Demographics Correct on Facesheet     Source of Information patient     When was your last doctors appointment? --   awhile ago    Does patient/caregiver understand observation status --   n/a    Lives With parent(s)     Facility Arrived From: home     Do you expect to return to your current living situation? Yes     Do you have help at home or someone to help you manage your care at home? Yes     Who are your caregiver(s) and their phone number(s)? mother; Marilou;  Rosa;  [phone]     Prior to hospitilization cognitive status: Alert/Oriented     Current cognitive status: Alert/Oriented     Walking or Climbing Stairs Difficulty none     Dressing/Bathing Difficulty none     Equipment Currently Used at Home none     Readmission within 30 days? No     Patient currently being followed by outpatient case  management? No     Do you currently have service(s) that help you manage your care at home? No     Do you take prescription medications? Yes     Do you have prescription coverage? Yes     Coverage [employer] Managed Medicare     Do you have any problems affording any of your prescribed medications? No     Is the patient taking medications as prescribed? yes     How do you get to doctors appointments? car, drives self     Are you on dialysis? No     Do you take coumadin? No     Discharge Plan A Home     Discharge Plan B Home     DME Needed Upon Discharge  none     Discharge Plan discussed with: Patient     Discharge Barriers Identified None        Relationship/Environment    Name(s) of Who Lives With Patient mother                 Patient from home with mother.  Reported being independent and has no DME or OP services. He has not had nay inpatient hospital stays within the last 30 days nor an ED visits.   Patient will need PCP followup at discharge.

## 2022-03-08 NOTE — EICU
Pt is here in DKA on the insulin drip.     Na 127 (uncorrected to glucose) > 132  K 6.9 > 4.9  Cr 2.6 > 1.9  HCO3 5 > 9  AG 27 > 18    Adding HCO3 150meq in D5W @ 100ml/hr for last Glu 200.     D/w bedside RN.     Follow up note:  - RN calling about am labs and glucose levels.     FS have been between 200-250 for pt was on 2 dextrose containing fluid.   HCO3 in D5 ws ordered by me after D5 and 1/2NS was discontinued.     AG has closed to 6, will keep the insulin drip on for next few hrs until glucose is better.   Cr 1.3 now  K 3.1  Mg 1.9  Phos 1.6    Adding K phos 30mmol IV x 1  Mg sulf 1gm IV x 1 now.     D/w bedside RN in detail.

## 2022-03-08 NOTE — CARE UPDATE
[facility] Intensive Care  ICU Shift Summary  Date: 3/8/2022      Prehospitalization: Home  Admit Date / LOS : 3/7/2022/ 1 days    Diagnosis: Diabetic ketoacidosis with coma associated with type 1 diabetes mellitus    Consults:        Active: OT, PT and RD       Needed: N/A     Code Status: Full Code   Advanced Directive: <no information>    LDA: PIV       Central Lines/Site/Justification:Patient Does Not Have Central Line       Urinary Cath/Order/Justification:Patient Does Not Have Urinary Catheter    Vasopressors/Infusions:        GOALS: Volume/ Hemodynamic: N/A                     RASS: N/A    Pain Management: none       Pain Controlled: not applicable     Rhythm: NSR    Respiratory Device: Room Air                  Most Recent SBT/ SAT: N/A       MOVE Screen: PASS  ICU Liberation: not applicable    VTE Prophylaxis: Pharm  Mobility: S: Self  Stress Ulcer Prophylaxis: No    Isolation: No active isolations  Dietary: PO  Tolerance: yes  Advancement: yes/@goal    I & O (24h):    Intake/Output Summary (Last 24 hours) at 3/8/2022 1753  Last data filed at 3/8/2022 1700  Gross per 24 hour   Intake 3754 ml   Output 3175 ml   Net 579 ml        Restraints: No    Significant Dates:  Post Op Date: N/A  Rescue Date: N/A  Imaging/ Diagnostics: N/A    Noteworthy Labs:  none    COVID Test: (--)  CBC/Anemia Labs: Coags:    Recent Labs   Lab 03/07/22  0946 03/08/22  0338   WBC 12.55 6.40   HGB 14.2 10.3*   HCT 46.8 31.0*   * 272   MCV 91 84   RDW 13.7 13.6    No results for input(s): PT, INR, APTT in the last 168 hours.     Chemistries:   Recent Labs   Lab 03/07/22  0946 03/07/22  1302 03/08/22  0000 03/08/22  0338   *   < > 133* 133*   K 6.9*   < > 3.7 3.1*   CL 95   < > 104 103   CO2 5*   < > 20* 24   BUN 41*   < > 29* 24*   CREATININE 2.6*   < > 1.4 1.3   CALCIUM 9.6   < > 8.0* 7.7*   PROT 10.0*  --   --   --    BILITOT 0.5  --   --   --    ALKPHOS 129  --   --   --    ALT 17  --   --   --    AST 13  --   --   --     MG  --   --   --  1.9   PHOS  --   --   --  1.6*    < > = values in this interval not displayed.        Cardiac Enzymes: Ejection Fractions:    Recent Labs     03/07/22  0946   TROPONINI <0.006    Nuc Rest EF   Date Value Ref Range Status   03/15/2019 53  Final        POCT Glucose: HbA1c:    Recent Labs   Lab 03/08/22  0740 03/08/22  1128 03/08/22  1634   POCTGLUCOSE 149* 387* 371*    Hemoglobin A1C   Date Value Ref Range Status   03/07/2022 10.3 (H) 4.0 - 5.6 % Final     Comment:     ADA Screening Guidelines:  5.7-6.4%  Consistent with prediabetes  >or=6.5%  Consistent with diabetes    High levels of fetal hemoglobin interfere with the HbA1C  assay. Heterozygous hemoglobin variants (HbS, HgC, etc)do  not significantly interfere with this assay.   However, presence of multiple variants may affect accuracy.             ICU LOS 1d 5h  Level of Care: OK to Transfer    Chart Check: 12 HR Done  Shift Summary/Plan for the shift: Pt off of insulin gtt and transfer orders placed, awaiting for bed on floor. Pt ambulated to chair, but very weak, Dr. Lugo notified and PT/OT ordered but not seen today. Pt remains free from injury. Vs and assessment per flowsheet.

## 2022-03-08 NOTE — CONSULTS
Nutrition-Related Diabetes Education      Time Spent: 5 Minutes    Learners: Patient    Current HbA1c: 10.3    Is patient aware of their A1c and their goal A1c?  Goal 6 - 7    Home diabetes medication(s): Patient states he takes 20U lantus + variable amount aspart with meals. - Per MD Note    Nutrition Education with handouts:   Carbohydrate Counting for people with DM  ADA Carbohydrate Exchange Packet  + Clinical Reference attachments to d/c documents      Comments: Visited pt - he was resting, he awoke to my introduction and I presented my reason for visit. He stated that he was tired and would like for me to come back but to leave materials at bedside. Will follow up.      Barriers to Learning: Hx of non-compliance, hx of polysubstance abuse, resistance to education/change      Please consult as needed.  Thank you!

## 2022-03-08 NOTE — SUBJECTIVE & OBJECTIVE
"Interval History: Feeling much better today. Says he is "tired."    Review of Systems   Constitutional:  Negative for chills and fever.   Respiratory:  Negative for shortness of breath.    Cardiovascular:  Negative for chest pain.   Gastrointestinal:  Negative for abdominal pain.   Genitourinary:  Negative for difficulty urinating.   Neurological:  Negative for weakness and numbness.   Psychiatric/Behavioral:  Negative for confusion.    Objective:     Vital Signs (Most Recent):  Temp: 97.7 °F (36.5 °C) (03/08/22 0715)  Pulse: 87 (03/08/22 0900)  Resp: (!) 21 (03/08/22 0900)  BP: (!) 109/59 (03/08/22 0900)  SpO2: 98 % (03/08/22 0900)   Vital Signs (24h Range):  Temp:  [97.5 °F (36.4 °C)-98.6 °F (37 °C)] 97.7 °F (36.5 °C)  Pulse:  [] 87  Resp:  [16-48] 21  SpO2:  [80 %-100 %] 98 %  BP: ()/(44-86) 109/59     Weight: 68 kg (150 lb)  Body mass index is 22.15 kg/m².    Intake/Output Summary (Last 24 hours) at 3/8/2022 1100  Last data filed at 3/8/2022 0838  Gross per 24 hour   Intake 7153.14 ml   Output 2600 ml   Net 4553.14 ml      Physical Exam  Vitals and nursing note reviewed.   Constitutional:       General: He is not in acute distress.     Appearance: He is ill-appearing. He is not toxic-appearing.   HENT:      Head: Normocephalic and atraumatic.      Nose: Nose normal.      Mouth/Throat:      Mouth: Mucous membranes are moist.   Cardiovascular:      Rate and Rhythm: Normal rate and regular rhythm.      Pulses: Normal pulses.      Heart sounds: Normal heart sounds. No murmur heard.    No gallop.   Pulmonary:      Effort: Pulmonary effort is normal. No respiratory distress.      Breath sounds: Normal breath sounds. No wheezing or rales.      Comments: Room air  Abdominal:      General: Bowel sounds are normal. There is no distension.      Palpations: Abdomen is soft.      Tenderness: There is no abdominal tenderness. There is no guarding.   Musculoskeletal:      Right lower leg: No edema.      Left lower " leg: No edema.   Skin:     General: Skin is warm and dry.   Neurological:      Mental Status: He is alert and oriented to person, place, and time.       Significant Labs: All pertinent labs within the past 24 hours have been reviewed.    Significant Imaging: I have reviewed all pertinent imaging results/findings within the past 24 hours.

## 2022-03-08 NOTE — ASSESSMENT & PLAN NOTE
Admitted with DKA. This resolved with bicarb gtt, insulin gtt.   Transition to subQ insulin- patient states he takes 20U lantus (though 42U is prescribed) and a variable amount of aspart during the day    A1c:   Lab Results   Component Value Date    HGBA1C 10.3 (H) 03/07/2022     Meds: detemir + aspart insulin + SSI PRN to maintain goal 140-180  ADA diet, accuchecks, hypoglycemic protocol

## 2022-03-08 NOTE — PLAN OF CARE
Problem: Adult Inpatient Plan of Care  Goal: Plan of Care Review  Outcome: Ongoing, Progressing  Goal: Patient-Specific Goal (Individualized)  Outcome: Ongoing, Progressing  Goal: Absence of Hospital-Acquired Illness or Injury  Outcome: Ongoing, Progressing  Goal: Optimal Comfort and Wellbeing  Outcome: Ongoing, Progressing  Goal: Readiness for Transition of Care  Outcome: Ongoing, Progressing     Problem: Diabetes Comorbidity  Goal: Blood Glucose Level Within Targeted Range  Outcome: Ongoing, Progressing     Problem: Skin Injury Risk Increased  Goal: Skin Health and Integrity  Outcome: Ongoing, Progressing     Problem: Fluid and Electrolyte Imbalance (Acute Kidney Injury/Impairment)  Goal: Fluid and Electrolyte Balance  Outcome: Ongoing, Progressing     Problem: Oral Intake Inadequate (Acute Kidney Injury/Impairment)  Goal: Optimal Nutrition Intake  Outcome: Ongoing, Progressing     Problem: Renal Function Impairment (Acute Kidney Injury/Impairment)  Goal: Effective Renal Function  Outcome: Ongoing, Progressing

## 2022-03-09 VITALS
WEIGHT: 130.06 LBS | DIASTOLIC BLOOD PRESSURE: 64 MMHG | TEMPERATURE: 99 F | SYSTOLIC BLOOD PRESSURE: 138 MMHG | HEIGHT: 69 IN | BODY MASS INDEX: 19.26 KG/M2 | HEART RATE: 93 BPM | OXYGEN SATURATION: 95 % | RESPIRATION RATE: 17 BRPM

## 2022-03-09 PROBLEM — E10.11 DIABETIC KETOACIDOSIS WITH COMA ASSOCIATED WITH TYPE 1 DIABETES MELLITUS: Status: RESOLVED | Noted: 2019-04-13 | Resolved: 2022-03-09

## 2022-03-09 PROBLEM — N17.9 AKI (ACUTE KIDNEY INJURY): Status: RESOLVED | Noted: 2022-03-07 | Resolved: 2022-03-09

## 2022-03-09 LAB
ANION GAP SERPL CALC-SCNC: 7 MMOL/L (ref 8–16)
BUN SERPL-MCNC: 21 MG/DL (ref 6–20)
CALCIUM SERPL-MCNC: 8.3 MG/DL (ref 8.7–10.5)
CHLORIDE SERPL-SCNC: 104 MMOL/L (ref 95–110)
CO2 SERPL-SCNC: 28 MMOL/L (ref 23–29)
CREAT SERPL-MCNC: 1 MG/DL (ref 0.5–1.4)
EST. GFR  (AFRICAN AMERICAN): >60 ML/MIN/1.73 M^2
EST. GFR  (NON AFRICAN AMERICAN): >60 ML/MIN/1.73 M^2
GLUCOSE SERPL-MCNC: 179 MG/DL (ref 70–110)
MAGNESIUM SERPL-MCNC: 2.3 MG/DL (ref 1.6–2.6)
PHOSPHATE SERPL-MCNC: 1.8 MG/DL (ref 2.7–4.5)
POCT GLUCOSE: 156 MG/DL (ref 70–110)
POCT GLUCOSE: 95 MG/DL (ref 70–110)
POTASSIUM SERPL-SCNC: 3.2 MMOL/L (ref 3.5–5.1)
SODIUM SERPL-SCNC: 139 MMOL/L (ref 136–145)

## 2022-03-09 PROCEDURE — 83735 ASSAY OF MAGNESIUM: CPT | Performed by: STUDENT IN AN ORGANIZED HEALTH CARE EDUCATION/TRAINING PROGRAM

## 2022-03-09 PROCEDURE — 25000003 PHARM REV CODE 250: Performed by: STUDENT IN AN ORGANIZED HEALTH CARE EDUCATION/TRAINING PROGRAM

## 2022-03-09 PROCEDURE — 80048 BASIC METABOLIC PNL TOTAL CA: CPT | Performed by: HOSPITALIST

## 2022-03-09 PROCEDURE — 63600175 PHARM REV CODE 636 W HCPCS: Performed by: STUDENT IN AN ORGANIZED HEALTH CARE EDUCATION/TRAINING PROGRAM

## 2022-03-09 PROCEDURE — 36415 COLL VENOUS BLD VENIPUNCTURE: CPT | Performed by: STUDENT IN AN ORGANIZED HEALTH CARE EDUCATION/TRAINING PROGRAM

## 2022-03-09 PROCEDURE — 63600175 PHARM REV CODE 636 W HCPCS: Performed by: HOSPITALIST

## 2022-03-09 PROCEDURE — 84100 ASSAY OF PHOSPHORUS: CPT | Performed by: STUDENT IN AN ORGANIZED HEALTH CARE EDUCATION/TRAINING PROGRAM

## 2022-03-09 RX ORDER — INSULIN GLARGINE 100 [IU]/ML
42 INJECTION, SOLUTION SUBCUTANEOUS NIGHTLY
Qty: 15 ML | Refills: 11 | Status: ON HOLD | OUTPATIENT
Start: 2022-03-09 | End: 2022-09-20

## 2022-03-09 RX ORDER — PEN NEEDLE, DIABETIC 30 GX3/16"
1 NEEDLE, DISPOSABLE MISCELLANEOUS
Qty: 100 EACH | Refills: 2 | Status: SHIPPED | OUTPATIENT
Start: 2022-03-09 | End: 2023-02-02 | Stop reason: CLARIF

## 2022-03-09 RX ORDER — INSULIN LISPRO 100 [IU]/ML
10 INJECTION, SOLUTION INTRAVENOUS; SUBCUTANEOUS
Qty: 15 ML | Refills: 11 | Status: ON HOLD | OUTPATIENT
Start: 2022-03-09 | End: 2022-09-20

## 2022-03-09 RX ADMIN — DULOXETINE 60 MG: 30 CAPSULE, DELAYED RELEASE ORAL at 08:03

## 2022-03-09 RX ADMIN — INSULIN ASPART 15 UNITS: 100 INJECTION, SOLUTION INTRAVENOUS; SUBCUTANEOUS at 08:03

## 2022-03-09 RX ADMIN — FAMOTIDINE 20 MG: 20 TABLET ORAL at 08:03

## 2022-03-09 RX ADMIN — HEPARIN SODIUM 5000 UNITS: 5000 INJECTION INTRAVENOUS; SUBCUTANEOUS at 06:03

## 2022-03-09 RX ADMIN — ASPIRIN 81 MG CHEWABLE TABLET 81 MG: 81 TABLET CHEWABLE at 08:03

## 2022-03-09 RX ADMIN — Medication 1 TABLET: at 08:03

## 2022-03-09 RX ADMIN — ATORVASTATIN CALCIUM 20 MG: 10 TABLET, FILM COATED ORAL at 08:03

## 2022-03-09 NOTE — PLAN OF CARE
Problem: Adult Inpatient Plan of Care  Goal: Plan of Care Review  3/9/2022 1216 by Ingrid Perez LPN  Outcome: Met  3/9/2022 1216 by Ingrid Perez LPN  Outcome: Ongoing, Progressing  Goal: Patient-Specific Goal (Individualized)  Outcome: Met  Goal: Absence of Hospital-Acquired Illness or Injury  Outcome: Met  Goal: Optimal Comfort and Wellbeing  Outcome: Met  Goal: Readiness for Transition of Care  Outcome: Met     Problem: Diabetes Comorbidity  Goal: Blood Glucose Level Within Targeted Range  Outcome: Met     Problem: Skin Injury Risk Increased  Goal: Skin Health and Integrity  Outcome: Met     Problem: Fluid and Electrolyte Imbalance (Acute Kidney Injury/Impairment)  Goal: Fluid and Electrolyte Balance  Outcome: Met     Problem: Oral Intake Inadequate (Acute Kidney Injury/Impairment)  Goal: Optimal Nutrition Intake  Outcome: Met     Problem: Renal Function Impairment (Acute Kidney Injury/Impairment)  Goal: Effective Renal Function  Outcome: Met

## 2022-03-09 NOTE — DISCHARGE SUMMARY
[facility] Medicine  Discharge Summary      Patient Name: Cali Mabry  MRN: [id number]  Patient Class: IP- Inpatient  Admission Date: 3/7/2022  Hospital Length of Stay: 2 days  Discharge Date and Time:  03/09/2022 12:52 PM  Attending Physician: Edei Alegria MD   Discharging Provider: Edie Kelly MD  Primary Care Provider: No primary care provider on file.      HPI:   Mr. Mabry is a 56-year-old gentleman with poorly controlled type 1 diabetes mellitus, hypertension, CAD, anemia of chronic disease, chronic hepatitis-C, active tobacco use, history of polysubstance abuse including opiates.  He presented to our emergency department with 2 days of progressive nausea, vomiting, polyuria, polydipsia, abdominal pain, respiratory distress, confusion, decreased concentration.  Patient with difficulty providing details of history due to decreased alertness and respiratory distress.  Mother at bedside able to provide some additional information.  Patient reports that he may have screwed up.  Has not been giving good track of insulin administration for the past several days.  Denies fever, chills, cough, diarrhea, urinary symptoms.  No sick contacts.  Denies recent substance abuse.  Still smoking occasional cigarette.      In the emergency department he was afebrile.  Tachycardic to 110s, tachypneic with respiratory rate up to the 50s, hypertensive to 170/72.  Not hypoxic.  Glucose elevated to 602. Severely acidotic with pH on VBG of 7.0.  Beta hydroxybutyrate elevated to 7.  Lactate elevated to 2.6.  CBC with evidence of hemoconcentration, all cell lines elevated.  Found to be hyponatremic to 127, hyperkalemic to 6.9.  BUN and creatinine elevated to 41 and 2.6.  Urinalysis with 3+ ketones and 4+ glucose but negative for infectious markers.  Chest and abdomen x-rays without acute changes.  EKG with some T-wave changes, not true peaking but may be evolving in that direction.  Patient given  calcium gluconate, albuterol.  Given 3 x1 L normal saline bolus.  Given regular insulin bolus.  Started on an insulin drip and normal saline infusion.  Admitted to ICU service for further management.    - Siddhartha Zavala MD      * No surgery found *      Hospital Course:   Mr Cali Mabry is a 56 y.o. man admitted with DKA. Started bicarb gtt, insulin gtt on admission. DKA resolved. Transitioned to subQ insulin regimen. Patient states he takes 20U lantus + variable amount aspart with meals. But then wasn't sure. Also states he ran out of needles and needs refills. Per [facility] records, is on 42 U of lantus nightly and 10 U of novolog with meals. Is supposed to have refills till July of this year. For safety, I have sent new Rx to our pharmacy to provide prior to discharge. Discussed with patient at bedside. Discharged in stable condition. F/u with PCP.        Goals of Care Treatment Preferences:  Code Status: Full Code      Consults:   Consults (From admission, onward)        Status Ordering Provider     Inpatient consult to Registered Dietitian/Nutritionist  Once        Provider:  (Not yet assigned)    Completed SIDDHARTHA ZAVALA        Final Active Diagnoses:    Diagnosis Date Noted POA    Hypokalemia [E87.6] 03/08/2022 No    Hypophosphatemia [E83.39] 03/08/2022 No    CAD (coronary artery disease) [I25.10] 08/13/2020 Yes     Chronic    YONAS (generalized anxiety disorder) [F41.1] 04/20/2020 Yes    Opioid abuse [F11.10] 08/05/2018 Yes     Chronic    Chronic hepatitis C [B18.2] 11/23/2017 Yes     Chronic    Essential hypertension [I10] 04/03/2016 Yes     Chronic    Anemia of chronic disease [D63.8] 04/03/2016 Yes     Chronic      Problems Resolved During this Admission:    Diagnosis Date Noted Date Resolved POA    PRINCIPAL PROBLEM:  Diabetic ketoacidosis with coma associated with type 1 diabetes mellitus [E10.11] 04/13/2019 03/09/2022 Yes    RAMYA (acute kidney injury) [N17.9] 03/07/2022 03/09/2022 Yes  "   Acute renal failure superimposed on chronic kidney disease [N17.9, N18.9] 11/24/2021 03/07/2022 Unknown       Discharged Condition: good    Disposition: Home or Self Care    Medications:  Reconciled Home Medications:      Medication List      START taking these medications    BD ULTRA-FINE GERTRUDIS PEN NEEDLE 32 gauge x 5/32" Ndle  Generic drug: pen needle, diabetic  1 each by Misc.(Non-Drug; Combo Route) route 4 (four) times daily before meals and nightly.     insulin lispro 100 unit/mL pen  Inject 10 Units into the skin 3 (three) times daily with meals.  Replaces: insulin lispro 100 unit/mL injection     LANTUS SOLOSTAR U-100 INSULIN glargine 100 units/mL (3mL) SubQ pen  Generic drug: insulin  Inject 42 Units into the skin every evening.  Replaces: LANTUS U-100 INSULIN 100 unit/mL injection        CONTINUE taking these medications    albuterol 90 mcg/actuation inhaler  Commonly known as: PROVENTIL/VENTOLIN HFA  Inhale 2 puffs into the lungs every 4 (four) hours as needed for Wheezing or Shortness of Breath. Rescue     albuterol-ipratropium 2.5 mg-0.5 mg/3 mL nebulizer solution  Commonly known as: DUO-NEB  Take 3 mLs by nebulization every 4 (four) hours as needed for Wheezing or Shortness of Breath. Rescue     aspirin 81 MG Chew  Take 1 tablet (81 mg total) by mouth once daily.     atorvastatin 20 MG tablet  Commonly known as: LIPITOR  Take 20 mg by mouth once daily.     b complex vitamins tablet  Take 1 tablet by mouth once daily.     bisacodyL 10 mg Supp  Commonly known as: DULCOLAX  Place 10 mg rectally daily as needed.     calcium carbonate 200 mg calcium (500 mg) chewable tablet  Commonly known as: TUMS  Take 1 tablet (500 mg total) by mouth daily as needed for Heartburn.     clopidogreL 75 mg tablet  Commonly known as: PLAVIX  Take 1 tablet (75 mg total) by mouth once daily.     diphenhydrAMINE 25 mg capsule  Commonly known as: BENADRYL  Take 25 mg by mouth every 6 (six) hours as needed for Itching.     folic " acid-vit B6-vit B12 2.5-25-2 mg 2.5-25-2 mg Tab  Commonly known as: FOLBIC or Equiv  Take 1 tablet by mouth once daily.     *naloxone 4 mg/actuation Spry  Commonly known as: NARCAN  4mg by nasal route as needed for opioid overdose; may repeat every 2-3 minutes in alternating nostrils until medical help arrives. Call 911     nitroGLYCERIN 0.4 MG SL tablet  Commonly known as: NITROSTAT  Place 1 tablet (0.4 mg total) under the tongue every 5 (five) minutes as needed for Chest pain.     ondansetron 4 MG tablet  Commonly known as: ZOFRAN  Take 1 tablet (4 mg total) by mouth every 8 (eight) hours as needed (Nausea and vomiting).     pantoprazole 40 MG tablet  Commonly known as: PROTONIX  Please take 1 tablet every 12 hours when you have abdominal discomfort, then take 1 tablet every day.       Indwelling Lines/Drains at time of discharge:   Lines/Drains/Airways     None                 Time spent on the discharge of patient: > 35 minutes         Edie Kelly MD  Department of Hospital Medicine  [facility]

## 2022-03-09 NOTE — PLAN OF CARE
NurseIngrid notified that all CM needs are met     03/09/22 1117   Final Note   Assessment Type Final Discharge Note   Anticipated Discharge Disposition Home   Hospital Resources/Appts/Education Provided   (pATIENT DOES NOT KNOW THE NAME OF new PCP but has an appointment scheduled this month.)   Post-Acute Status   Post-Acute Authorization Other   Other Status No Post-Acute Service Needs   Discharge Delays None known at this time

## 2022-03-09 NOTE — NURSING
Report received from night nurse RITA Kirby. Visualized patient and assessed patient's overall condition and appearance. No acute distress noted. Will continue to monitor

## 2022-03-09 NOTE — NURSING
In preparation for discharge, d/c'd pt's saline lock, applied pressure to site, secured gauze and coban, no redness or swelling noted. Instructions given.Reviewed all new meds, continued medications, follow up appointments and signs and symptoms to report to PCP or seek emergency medical care. Pt verbalized understanding to all instructions and education. Pt denies any complaints or concerns at this time. Pt was transported off the unit to cab for discharge.

## 2022-03-09 NOTE — PT/OT/SLP PROGRESS
Physical Therapy      Patient Name:  Cali Mabry   MRN:  [id number]    Patient not seen today secondary to  (PT orders cancelled by Physician prior to evaluation.). PT to sign off.

## 2022-03-09 NOTE — NURSING
Patient report received from Jacquelyn. Patient alert and oriented times 3. Patient  and able to make needs known. Patient transferred via bed from ICU. No complaints of pain nor discomfort at this time. Oriented to unit. Will continue with current plan of care.

## 2022-03-11 ENCOUNTER — PATIENT MESSAGE (OUTPATIENT)
Dept: ADMINISTRATIVE | Facility: CLINIC | Age: 57
End: 2022-03-11
Payer: MEDICARE

## 2022-08-09 ENCOUNTER — HOSPITAL ENCOUNTER (EMERGENCY)
Facility: HOSPITAL | Age: 57
Discharge: LEFT BEFORE DISCHARGE | End: 2022-08-09
Payer: MEDICARE

## 2022-08-09 VITALS
DIASTOLIC BLOOD PRESSURE: 76 MMHG | OXYGEN SATURATION: 98 % | SYSTOLIC BLOOD PRESSURE: 135 MMHG | TEMPERATURE: 99 F | WEIGHT: 150 LBS | BODY MASS INDEX: 22.73 KG/M2 | RESPIRATION RATE: 18 BRPM | HEART RATE: 98 BPM | HEIGHT: 68 IN

## 2022-08-09 PROCEDURE — 99283 EMERGENCY DEPT VISIT LOW MDM: CPT

## 2022-08-10 ENCOUNTER — HOSPITAL ENCOUNTER (EMERGENCY)
Facility: HOSPITAL | Age: 57
Discharge: HOME | End: 2022-08-10
Attending: EMERGENCY MEDICINE
Payer: MEDICARE

## 2022-08-10 VITALS
TEMPERATURE: 98 F | RESPIRATION RATE: 20 BRPM | HEIGHT: 68 IN | DIASTOLIC BLOOD PRESSURE: 70 MMHG | WEIGHT: 150 LBS | SYSTOLIC BLOOD PRESSURE: 120 MMHG | BODY MASS INDEX: 22.73 KG/M2 | HEART RATE: 74 BPM | OXYGEN SATURATION: 100 %

## 2022-08-10 DIAGNOSIS — G44.319 ACUTE POST-TRAUMATIC HEADACHE, NOT INTRACTABLE: Primary | ICD-10-CM

## 2022-08-10 DIAGNOSIS — S01.01XA LACERATION OF SCALP, INITIAL ENCOUNTER: ICD-10-CM

## 2022-08-10 DIAGNOSIS — M54.2 NECK PAIN: ICD-10-CM

## 2022-08-10 LAB — POCT GLUCOSE: 253 MG/DL (ref 70–110)

## 2022-08-10 PROCEDURE — 12001 RPR S/N/AX/GEN/TRNK 2.5CM/<: CPT

## 2022-08-10 PROCEDURE — 90471 IMMUNIZATION ADMIN: CPT | Performed by: NURSE PRACTITIONER

## 2022-08-10 PROCEDURE — 82962 GLUCOSE BLOOD TEST: CPT

## 2022-08-10 PROCEDURE — 90715 TDAP VACCINE 7 YRS/> IM: CPT | Performed by: NURSE PRACTITIONER

## 2022-08-10 PROCEDURE — 99285 EMERGENCY DEPT VISIT HI MDM: CPT | Mod: 25

## 2022-08-10 PROCEDURE — 25000003 PHARM REV CODE 250: Performed by: NURSE PRACTITIONER

## 2022-08-10 PROCEDURE — 63600175 PHARM REV CODE 636 W HCPCS: Performed by: NURSE PRACTITIONER

## 2022-08-10 RX ORDER — ACETAMINOPHEN 325 MG/1
650 TABLET ORAL
Status: COMPLETED | OUTPATIENT
Start: 2022-08-10 | End: 2022-08-10

## 2022-08-10 RX ORDER — LIDOCAINE HYDROCHLORIDE 10 MG/ML
10 INJECTION INFILTRATION; PERINEURAL
Status: COMPLETED | OUTPATIENT
Start: 2022-08-10 | End: 2022-08-10

## 2022-08-10 RX ORDER — MAG HYDROX/ALUMINUM HYD/SIMETH 200-200-20
30 SUSPENSION, ORAL (FINAL DOSE FORM) ORAL
Status: COMPLETED | OUTPATIENT
Start: 2022-08-10 | End: 2022-08-10

## 2022-08-10 RX ORDER — IBUPROFEN 600 MG/1
600 TABLET ORAL
Status: COMPLETED | OUTPATIENT
Start: 2022-08-10 | End: 2022-08-10

## 2022-08-10 RX ADMIN — Medication: at 06:08

## 2022-08-10 RX ADMIN — ACETAMINOPHEN 650 MG: 325 TABLET ORAL at 06:08

## 2022-08-10 RX ADMIN — TETANUS TOXOID, REDUCED DIPHTHERIA TOXOID AND ACELLULAR PERTUSSIS VACCINE, ADSORBED 0.5 ML: 5; 2.5; 8; 8; 2.5 SUSPENSION INTRAMUSCULAR at 07:08

## 2022-08-10 RX ADMIN — IBUPROFEN 600 MG: 600 TABLET ORAL at 06:08

## 2022-08-10 RX ADMIN — LIDOCAINE HYDROCHLORIDE 10 ML: 10 INJECTION, SOLUTION INFILTRATION; PERINEURAL at 06:08

## 2022-08-10 RX ADMIN — ALUMINUM HYDROXIDE, MAGNESIUM HYDROXIDE, AND SIMETHICONE 30 ML: 200; 200; 20 SUSPENSION ORAL at 06:08
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
## 2022-08-10 NOTE — DISCHARGE INSTRUCTIONS
You can take Tylenol or ibuprofen for pain, as needed.    Please take your insulin when you return home.    Please follow the wound care instructions on your paperwork.    Return to the emergency room in 1 week for staple removal.    Return to the emergency room for any new or worsening symptoms or concerns.
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
## 2022-08-15 ENCOUNTER — HOSPITAL ENCOUNTER (EMERGENCY)
Facility: HOSPITAL | Age: 57
Discharge: HOME | End: 2022-08-15
Attending: EMERGENCY MEDICINE
Payer: MEDICARE

## 2022-08-15 VITALS
DIASTOLIC BLOOD PRESSURE: 72 MMHG | RESPIRATION RATE: 18 BRPM | WEIGHT: 145 LBS | SYSTOLIC BLOOD PRESSURE: 136 MMHG | BODY MASS INDEX: 21.98 KG/M2 | TEMPERATURE: 99 F | OXYGEN SATURATION: 98 % | HEART RATE: 115 BPM | HEIGHT: 68 IN

## 2022-08-15 DIAGNOSIS — Z48.02 ENCOUNTER FOR STAPLE REMOVAL: Primary | ICD-10-CM

## 2022-08-15 LAB — POCT GLUCOSE: 67 MG/DL (ref 70–110)

## 2022-08-15 PROCEDURE — 82962 GLUCOSE BLOOD TEST: CPT

## 2022-08-15 PROCEDURE — 99282 EMERGENCY DEPT VISIT SF MDM: CPT | Mod: 25

## 2022-08-15 NOTE — ED PROVIDER NOTES
Encounter Date: 8/15/2022       History     Chief Complaint   Patient presents with    Suture / Staple Removal     Pt to ER for staple removal.      Chief Complaint:  Staple removal  History of  Present Illness: History obtained from patient. This 56 y.o. male presents to the ED staple removal after having staples placed the scalp 5 days ago.  Denies fever or drainage.         Review of patient's allergies indicates:  No Known Allergies  Past Medical History:   Diagnosis Date    Acute on chronic pancreatitis 4/3/2016    CHF (congestive heart failure)     DKA (diabetic ketoacidoses) 2020    DM (diabetes mellitus), type 1     HCV (hepatitis C virus)     high VL     HTN (hypertension)     Hypomagnesemia 2020    IVDU (intravenous drug user)     Heroin    Pancreatitis      Past Surgical History:   Procedure Laterality Date    CARDIAC SURGERY      stent placed. ([facility])    ESOPHAGOGASTRODUODENOSCOPY N/A 3/5/2020    Gastritis.  No H pylori.  Completed PPI for 1 month.  Procedure: EGD (ESOPHAGOGASTRODUODENOSCOPY);  Surgeon: Brinda Rene MD;  Location: [facility] ENDO;  Service: Endoscopy;  Laterality: N/A;  W421 A; [phone]    LEFT HEART CATHETERIZATION Left 2020    Procedure: Left heart cath;  Surgeon: Fito Rangel MD;  Location: [facility] CATH LAB;  Service: Cardiology;  Laterality: Left;    SHOULDER SURGERY      right shoulder, spur removal.     Family History   Problem Relation Age of Onset    Asthma Mother      Social History     Tobacco Use    Smoking status: Current Every Day Smoker     Packs/day: 1.00     Years: 20.00     Pack years: 20.00     Types: Cigarettes     Start date: 4/3/1981     Last attempt to quit: 2020     Years since quittin.2    Smokeless tobacco: Never Used    Tobacco comment: states approx 1 per day.   Substance Use Topics    Alcohol use: Not Currently     Alcohol/week: 0.0 standard drinks    Drug use: Not Currently     Types: Heroin, IV      Review of Systems   Constitutional: Negative for fever.   Gastrointestinal: Negative for nausea and vomiting.   Skin: Positive for wound. Negative for color change.       Physical Exam     Initial Vitals [08/15/22 1210]   BP Pulse Resp Temp SpO2   136/72 (!) 115 18 98.6 °F (37 °C) 98 %      MAP       --         Physical Exam    Nursing note and vitals reviewed.  Constitutional: He appears well-developed and well-nourished. He is active.  Non-toxic appearance. He does not have a sickly appearance. He does not appear ill.   HENT:   Head: Normocephalic.   Nose: Nose normal.   Mouth/Throat: Uvula is midline, oropharynx is clear and moist and mucous membranes are normal.   Eyes: Conjunctivae, EOM and lids are normal. Pupils are equal, round, and reactive to light.   Neck: Neck supple.   Normal range of motion.   Full passive range of motion without pain.     Cardiovascular: Normal rate and regular rhythm.   Musculoskeletal:      Cervical back: Full passive range of motion without pain, normal range of motion and neck supple.     Neurological: He is alert and oriented to person, place, and time.   Skin: Skin is warm. Capillary refill takes less than 2 seconds.   There is a healing laceration measuring approximately 1 cm stab wound of the scalp.  No surrounding erythema induration.  No drainage.         ED Course   Suture Removal    Date/Time: 8/15/2022 2:16 PM  Location procedure was performed: [facility] EMERGENCY DEPARTMENT  Performed by: Rickey Phillip PA-C  Authorized by: Radha Guerrire MD   Body area: head/neck  Location details: scalp  Wound Appearance: clean, well healed, normal color, nontender and no drainage  Staples Removed: 2  Post-removal: no dressing applied  Facility: sutures placed in this facility  Complications: No  Implants: No  Patient tolerance: Patient tolerated the procedure well with no immediate complications        Labs Reviewed   POCT GLUCOSE - Abnormal; Notable for the following components:        Result Value    POCT Glucose 67 (*)     All other components within normal limits          Imaging Results    None          Medications - No data to display  Medical Decision Making:   ED Management:  This is an evaluation of a 56 y.o. male who presents to the ED for staple removal.  Vital signs are stable.   Afebrile.  Patient is nontoxic appearing and in no acute distress.  No evidence of infection. Staples removed without complication     Patient given return precautions and instructed to return to the emergency department for any new or worsening symptoms. Patient verbalized understanding and agreed with plan. Encouraged follow-up with PCP.                        Clinical Impression:   Final diagnoses:  [Z48.02] Encounter for staple removal (Primary)          ED Disposition Condition    Discharge Stable        ED Prescriptions     None        Follow-up Information     Follow up With Specialties Details Why Contact Info     [facility]  Schedule an appointment as soon as possible for a visit in 1 day For reevaluation [address] [place] [address]  [phone]      [facility] Emergency Dept Emergency Medicine Go in 1 day If symptoms worsen [address] [facility] [id number] [phone]           Rickey Phillip PA-C  08/15/22 0668

## 2022-08-15 NOTE — ED NOTES
CALL PLACED TO NUMBER ON CHART, SPOKE WITH PT MOTHER, WHO REPORTS PT. DOES NOT HAVE A CELL PHONE AND WILL BE HOME IN 2-3 HOURS

## 2022-09-02 ENCOUNTER — HOSPITAL ENCOUNTER (EMERGENCY)
Facility: HOSPITAL | Age: 57
Discharge: HOME | End: 2022-09-02
Attending: EMERGENCY MEDICINE
Payer: MEDICARE

## 2022-09-02 VITALS
OXYGEN SATURATION: 98 % | HEART RATE: 84 BPM | WEIGHT: 145 LBS | RESPIRATION RATE: 18 BRPM | BODY MASS INDEX: 22.05 KG/M2 | TEMPERATURE: 98 F | SYSTOLIC BLOOD PRESSURE: 152 MMHG | DIASTOLIC BLOOD PRESSURE: 77 MMHG

## 2022-09-02 DIAGNOSIS — R73.9 HYPERGLYCEMIA: Primary | ICD-10-CM

## 2022-09-02 LAB
POCT GLUCOSE: 343 MG/DL (ref 70–110)
POCT GLUCOSE: 368 MG/DL (ref 70–110)

## 2022-09-02 PROCEDURE — 99284 EMERGENCY DEPT VISIT MOD MDM: CPT | Mod: 25

## 2022-09-02 PROCEDURE — 82962 GLUCOSE BLOOD TEST: CPT

## 2022-09-02 RX ORDER — NAPROXEN SODIUM 220 MG
1 TABLET ORAL 3 TIMES DAILY
Qty: 90 EACH | Refills: 0 | Status: SHIPPED | OUTPATIENT
Start: 2022-09-02 | End: 2023-02-02 | Stop reason: CLARIF

## 2022-09-02 NOTE — ED NOTES
Patient states that he is only here for insulin and wants to put his clothes back on, he says that he is not sleeping here and he will be gone by 5PM.

## 2022-09-02 NOTE — ED PROVIDER NOTES
"SCRIBE #1 NOTE: I, Taylor Rose, am scribing for, and in the presence of,  Candice Li MD. I have scribed the following portions of the note - Other sections scribed: HPI, ROS, PE.         EM PHYSICIAN NOTE    HPI  This patient presents with a complaint of   Chief Complaint   Patient presents with    Hyperglycemia     Pt reporting hyperglycemia. Pt states he hasn't taken his insulin in a week due to being mad his MD prescribes insulin via an insulin pen. Pt states, "He ordered it anyway, I told him to stick it up his ass".        HPI: Cali Mabry is a 57 y.o. male, with a PMHx of Type 1 DM, DKA, CHF, HTN and Pancreatitis, who presents to the ED with hyperglycemia and CBG in the 300's as a result of not taking his insulin (Humalog) in over a week. Also reports decrease in appetite and recent weight loss. Patient aggravated and reports PCP refuses to prescribe Insulin vials to be used with needles/syringes because of patient's Hx with IVDU. Patient states he has not used IV drugs in over a year, but PCP insists that he sees new needle marks to arm. Further reports that PCP has prescribed Insulin pen but patient refuses to use it because he has been using vials and syringes for over 30 years. Patient requesting Px of Humalog and syringes, stating he only has 3 syringes left. Patient also compliant with Levemir but still has some left. No other modifying factors. Patient denies any other associated symptoms.       REVIEW of PMH, SOC History and Family History:  Past Medical History:   Diagnosis Date    Acute on chronic pancreatitis 4/3/2016    CHF (congestive heart failure)     DKA (diabetic ketoacidoses) 02/2020    DM (diabetes mellitus), type 1     HCV (hepatitis C virus)     high VL     HTN (hypertension)     Hypomagnesemia 5/25/2020    IVDU (intravenous drug user)     Heroin    Pancreatitis      Social history noncontributory  Family history noncontributory  Review of patient's allergies " indicates:  No Known Allergies        REVIEW of SYSTEMS  Source: HPI  The nurse's notes and triage vital signs were reviewed.  GENERAL/CONSTITUTIONAL: SEE HPI  CARDIOVASCULAR: There is no report of chest pain   RESPIRATORY: There is no report of cough or SOB  GASTROINTESTINAL: There is no report of nausea, vomiting, diarrhea  MUSCULOSKELETAL: There is no report of joint or muscle pain. No muscle weakness or tenderness.  SKIN AND BREASTS: There is no report of easy bruising, skin redness, skin rash.  HEMATOLOGIC/LYMPHATIC: There is no report of anemia, bleeding or clotting defects. There is no report of anticoagulant use.  The remainder of the ROS is negative.        PHYSICAL EXAMINATION    ED Triage Vitals [09/02/22 1405]   Enc Vitals Group      BP (!) 153/78      Pulse 88      Resp 18      Temp 98.1 °F (36.7 °C)      Temp src Oral      SpO2 99 %      Weight 145 lb      Height       Head Circumference       Peak Flow       Pain Score       Pain Loc       Pain Edu?       Excl. in GC?      Vital signs and Pulse Ox reviewed in clinical context. Abnormalities noted: Hypertension noted and patient will be referred to primary care physician for close follow-up  Pt's level of consciousness is alert, and the patient is in no distress.  Skin: warm, pink and dry.  Capillary refill is less than 2 seconds.  Mucosa:moist  Head and Neck: WNL  Cardiac exam: RRR  Pulmonary exam: unlabored and clear  Abd Exam: soft nontender   Musculoskeletal: no joint tenderness, deformity or swelling   Neurologic: GCS: GCS 15; 5 over 5 strength, cranial nerves intact, neck supple       Initial Impression:  Hyperglycemia  Plan:  I will prescribe a refill of his regular insulin and syringes and instruct him to follow up with his primary  Micelle JACKLYN Li MD, 5:03 PM 9/2/2022      Medical decision making:   Nurses notes and Vital Signs reviewed.  No orders of the defined types were placed in this encounter.           Diagnoses that have been ruled  "out:   None   Diagnoses that are still under consideration:   None   Final diagnoses:   Hyperglycemia            Follow-up Information       Follow up With Specialties Details Why Contact [facility]al Health, Psychiatry, Psychology Schedule an appointment as soon as possible for a visit in 2 days Call to schedule an appointment [address]  [address]  [facility] [address]  [phone]            ED Prescriptions       Medication Sig Dispense Start Date End Date Auth. Provider    insulin regular (HUMULIN R REGULAR U-100 INSULN) 100 unit/mL Inj injection Inject 6 Units into the skin 3 (three) times daily before meals. 10 mL 9/2/2022 10/28/2022 Candice Li MD    insulin syringe-needle U-100 0.5 mL 31 gauge x 5/16" Syrg 1 each by Misc.(Non-Drug; Combo Route) route 3 (three) times daily. 90 each 9/2/2022 -- Candice Li MD            This note was created using Dictation Software.  This program may occasionally misinterpret certain words and phrases.        Scribe Attestation:   Scribe #1: I performed the above scribed service and the documentation accurately describes the services I performed. I attest to the accuracy of the note.      SCRIBE ATTESTATION NOTE:   I attest that I personally performed the services documented by the scribe and acknowledged and confirm the content of the note.   Nurses notes were reviewed.  Candice Li      Nurses notes were reviewed.           ED Disposition Condition    Discharge Stable                            Candice Li MD  09/02/22 1735    "

## 2022-09-02 NOTE — ED TRIAGE NOTES
"Patient presents to ED for complaints of "high blood sugar". Patient reports that his PCP wanted to start him on the insulin pen instead of the vial due to having a history of IV drug use so the MD does not want to prescribe the vials because it would give pt access to needles/syringes. States that he hasn't done IV drugs in a long time but his PCP insist that he has new needle marks to his arm. Patient states that he will not use an insulin pen.   "

## 2022-09-16 ENCOUNTER — HOSPITAL ENCOUNTER (INPATIENT)
Facility: HOSPITAL | Age: 57
LOS: 3 days | Discharge: HOME | DRG: 638 | End: 2022-09-20
Attending: EMERGENCY MEDICINE | Admitting: STUDENT IN AN ORGANIZED HEALTH CARE EDUCATION/TRAINING PROGRAM
Payer: MEDICARE

## 2022-09-16 DIAGNOSIS — E11.10 DKA (DIABETIC KETOACIDOSIS): ICD-10-CM

## 2022-09-16 DIAGNOSIS — R73.9 HYPERGLYCEMIA: ICD-10-CM

## 2022-09-16 DIAGNOSIS — E10.10 DIABETIC KETOACIDOSIS WITHOUT COMA ASSOCIATED WITH TYPE 1 DIABETES MELLITUS: Primary | ICD-10-CM

## 2022-09-16 LAB
B-OH-BUTYR BLD STRIP-SCNC: 6.3 MMOL/L (ref 0–0.5)
BASOPHILS # BLD AUTO: 0.02 K/UL (ref 0–0.2)
BASOPHILS NFR BLD: 0.2 % (ref 0–1.9)
DIFFERENTIAL METHOD: ABNORMAL
EOSINOPHIL # BLD AUTO: 0 K/UL (ref 0–0.5)
EOSINOPHIL NFR BLD: 0.4 % (ref 0–8)
ERYTHROCYTE [DISTWIDTH] IN BLOOD BY AUTOMATED COUNT: 14.1 % (ref 11.5–14.5)
HCT VFR BLD AUTO: 46.8 % (ref 40–54)
HGB BLD-MCNC: 15 G/DL (ref 14–18)
IMM GRANULOCYTES # BLD AUTO: 0.06 K/UL (ref 0–0.04)
IMM GRANULOCYTES NFR BLD AUTO: 0.5 % (ref 0–0.5)
LYMPHOCYTES # BLD AUTO: 1.5 K/UL (ref 1–4.8)
LYMPHOCYTES NFR BLD: 13.4 % (ref 18–48)
MCH RBC QN AUTO: 27.5 PG (ref 27–31)
MCHC RBC AUTO-ENTMCNC: 32.1 G/DL (ref 32–36)
MCV RBC AUTO: 86 FL (ref 82–98)
MONOCYTES # BLD AUTO: 0.9 K/UL (ref 0.3–1)
MONOCYTES NFR BLD: 8.5 % (ref 4–15)
NEUTROPHILS # BLD AUTO: 8.5 K/UL (ref 1.8–7.7)
NEUTROPHILS NFR BLD: 77 % (ref 38–73)
NRBC BLD-RTO: 0 /100 WBC
PLATELET # BLD AUTO: 260 K/UL (ref 150–450)
PMV BLD AUTO: 10.1 FL (ref 9.2–12.9)
POCT GLUCOSE: >500 MG/DL (ref 70–110)
RBC # BLD AUTO: 5.45 M/UL (ref 4.6–6.2)
WBC # BLD AUTO: 11.04 K/UL (ref 3.9–12.7)

## 2022-09-16 PROCEDURE — 82077 ASSAY SPEC XCP UR&BREATH IA: CPT | Performed by: EMERGENCY MEDICINE

## 2022-09-16 PROCEDURE — 96374 THER/PROPH/DIAG INJ IV PUSH: CPT

## 2022-09-16 PROCEDURE — 80053 COMPREHEN METABOLIC PANEL: CPT | Performed by: EMERGENCY MEDICINE

## 2022-09-16 PROCEDURE — 99291 CRITICAL CARE FIRST HOUR: CPT | Mod: 25

## 2022-09-16 PROCEDURE — 85025 COMPLETE CBC W/AUTO DIFF WBC: CPT | Performed by: EMERGENCY MEDICINE

## 2022-09-16 PROCEDURE — 82010 KETONE BODYS QUAN: CPT | Performed by: EMERGENCY MEDICINE

## 2022-09-16 PROCEDURE — 63600175 PHARM REV CODE 636 W HCPCS: Performed by: EMERGENCY MEDICINE

## 2022-09-16 RX ORDER — ONDANSETRON 2 MG/ML
4 INJECTION INTRAMUSCULAR; INTRAVENOUS
Status: COMPLETED | OUTPATIENT
Start: 2022-09-16 | End: 2022-09-16

## 2022-09-16 RX ADMIN — INSULIN HUMAN 5 UNITS: 100 INJECTION, SOLUTION PARENTERAL at 11:09

## 2022-09-16 RX ADMIN — ONDANSETRON 4 MG: 2 INJECTION INTRAMUSCULAR; INTRAVENOUS at 11:09

## 2022-09-17 PROBLEM — J43.8 OTHER EMPHYSEMA: Status: RESOLVED | Noted: 2020-04-20 | Resolved: 2022-09-17

## 2022-09-17 PROBLEM — J44.9 COPD (CHRONIC OBSTRUCTIVE PULMONARY DISEASE): Status: ACTIVE | Noted: 2020-08-08

## 2022-09-17 PROBLEM — Z91.148 NONCOMPLIANCE WITH MEDICATION REGIMEN: Status: ACTIVE | Noted: 2022-09-17

## 2022-09-17 PROBLEM — F11.11 HISTORY OF HEROIN ABUSE: Status: ACTIVE | Noted: 2020-04-23

## 2022-09-17 PROBLEM — E11.10 DKA (DIABETIC KETOACIDOSIS): Status: RESOLVED | Noted: 2019-04-13 | Resolved: 2022-09-17

## 2022-09-17 LAB
ALBUMIN SERPL BCP-MCNC: 4.5 G/DL (ref 3.5–5.2)
ALLENS TEST: ABNORMAL
ALP SERPL-CCNC: 132 U/L (ref 55–135)
ALT SERPL W/O P-5'-P-CCNC: 21 U/L (ref 10–44)
AMPHET+METHAMPHET UR QL: NEGATIVE
ANION GAP SERPL CALC-SCNC: 12 MMOL/L (ref 8–16)
ANION GAP SERPL CALC-SCNC: 15 MMOL/L (ref 8–16)
ANION GAP SERPL CALC-SCNC: 21 MMOL/L (ref 8–16)
ANION GAP SERPL CALC-SCNC: 30 MMOL/L (ref 8–16)
ANION GAP SERPL CALC-SCNC: 32 MMOL/L (ref 8–16)
AST SERPL-CCNC: 20 U/L (ref 10–40)
BARBITURATES UR QL SCN>200 NG/ML: NEGATIVE
BENZODIAZ UR QL SCN>200 NG/ML: NEGATIVE
BILIRUB SERPL-MCNC: 0.6 MG/DL (ref 0.1–1)
BUN SERPL-MCNC: 22 MG/DL (ref 6–20)
BUN SERPL-MCNC: 25 MG/DL (ref 6–20)
BUN SERPL-MCNC: 27 MG/DL (ref 6–20)
BZE UR QL SCN: NEGATIVE
CALCIUM SERPL-MCNC: 10.1 MG/DL (ref 8.7–10.5)
CALCIUM SERPL-MCNC: 7.8 MG/DL (ref 8.7–10.5)
CALCIUM SERPL-MCNC: 8.6 MG/DL (ref 8.7–10.5)
CALCIUM SERPL-MCNC: 8.9 MG/DL (ref 8.7–10.5)
CALCIUM SERPL-MCNC: 9 MG/DL (ref 8.7–10.5)
CANNABINOIDS UR QL SCN: NEGATIVE
CHLORIDE SERPL-SCNC: 101 MMOL/L (ref 95–110)
CHLORIDE SERPL-SCNC: 107 MMOL/L (ref 95–110)
CHLORIDE SERPL-SCNC: 108 MMOL/L (ref 95–110)
CHLORIDE SERPL-SCNC: 109 MMOL/L (ref 95–110)
CHLORIDE SERPL-SCNC: 94 MMOL/L (ref 95–110)
CO2 SERPL-SCNC: 10 MMOL/L (ref 23–29)
CO2 SERPL-SCNC: 14 MMOL/L (ref 23–29)
CO2 SERPL-SCNC: 15 MMOL/L (ref 23–29)
CO2 SERPL-SCNC: 5 MMOL/L (ref 23–29)
CO2 SERPL-SCNC: 5 MMOL/L (ref 23–29)
CREAT SERPL-MCNC: 1.4 MG/DL (ref 0.5–1.4)
CREAT SERPL-MCNC: 1.6 MG/DL (ref 0.5–1.4)
CREAT SERPL-MCNC: 1.7 MG/DL (ref 0.5–1.4)
CREAT SERPL-MCNC: 1.8 MG/DL (ref 0.5–1.4)
CREAT SERPL-MCNC: 1.9 MG/DL (ref 0.5–1.4)
CREAT UR-MCNC: 15.1 MG/DL (ref 23–375)
CTP QC/QA: YES
CTP QC/QA: YES
DELSYS: ABNORMAL
EST. GFR  (NO RACE VARIABLE): 41 ML/MIN/1.73 M^2
EST. GFR  (NO RACE VARIABLE): 44 ML/MIN/1.73 M^2
EST. GFR  (NO RACE VARIABLE): 47 ML/MIN/1.73 M^2
EST. GFR  (NO RACE VARIABLE): 50 ML/MIN/1.73 M^2
EST. GFR  (NO RACE VARIABLE): 59 ML/MIN/1.73 M^2
ESTIMATED AVG GLUCOSE: 263 MG/DL (ref 68–131)
ETHANOL SERPL-MCNC: <10 MG/DL
GLUCOSE SERPL-MCNC: 265 MG/DL (ref 70–110)
GLUCOSE SERPL-MCNC: 389 MG/DL (ref 70–110)
GLUCOSE SERPL-MCNC: 474 MG/DL (ref 70–110)
GLUCOSE SERPL-MCNC: 635 MG/DL (ref 70–110)
GLUCOSE SERPL-MCNC: 736 MG/DL (ref 70–110)
HBA1C MFR BLD: 10.8 % (ref 4–5.6)
HCO3 UR-SCNC: 10.6 MMOL/L (ref 24–28)
HCO3 UR-SCNC: 11.5 MMOL/L (ref 24–28)
HCO3 UR-SCNC: 13.8 MMOL/L (ref 24–28)
HCO3 UR-SCNC: 18.1 MMOL/L (ref 24–28)
LACTATE SERPL-SCNC: 2.1 MMOL/L (ref 0.5–2.2)
LACTATE SERPL-SCNC: 2.7 MMOL/L (ref 0.5–2.2)
LACTATE SERPL-SCNC: 2.8 MMOL/L (ref 0.5–2.2)
LDH SERPL L TO P-CCNC: 3.36 MMOL/L (ref 0.5–2.2)
LIPASE SERPL-CCNC: 31 U/L (ref 4–60)
MAGNESIUM SERPL-MCNC: 2.2 MG/DL (ref 1.6–2.6)
METHADONE UR QL SCN>300 NG/ML: NEGATIVE
MODE: ABNORMAL
OPIATES UR QL SCN: NEGATIVE
PCO2 BLDA: 36.8 MMHG (ref 35–45)
PCO2 BLDA: 40 MMHG (ref 35–45)
PCO2 BLDA: 40.7 MMHG (ref 35–45)
PCO2 BLDA: 47.4 MMHG (ref 35–45)
PCP UR QL SCN>25 NG/ML: NEGATIVE
PH SMN: 7.03 [PH] (ref 7.35–7.45)
PH SMN: 7.06 [PH] (ref 7.35–7.45)
PH SMN: 7.18 [PH] (ref 7.35–7.45)
PH SMN: 7.19 [PH] (ref 7.35–7.45)
PHOSPHATE SERPL-MCNC: 4.6 MG/DL (ref 2.7–4.5)
PO2 BLDA: 26 MMHG (ref 40–60)
PO2 BLDA: 28 MMHG (ref 40–60)
PO2 BLDA: 31 MMHG (ref 40–60)
PO2 BLDA: 32 MMHG (ref 40–60)
POC BE: -10 MMOL/L
POC BE: -14 MMOL/L
POC BE: -18 MMOL/L
POC BE: -20 MMOL/L
POC MOLECULAR INFLUENZA A AGN: NEGATIVE
POC MOLECULAR INFLUENZA B AGN: NEGATIVE
POC SATURATED O2: 28 % (ref 95–100)
POC SATURATED O2: 31 % (ref 95–100)
POC SATURATED O2: 45 % (ref 95–100)
POC SATURATED O2: 48 % (ref 95–100)
POC TCO2: 12 MMOL/L (ref 24–29)
POC TCO2: 13 MMOL/L (ref 24–29)
POC TCO2: 15 MMOL/L (ref 24–29)
POC TCO2: 19 MMOL/L (ref 24–29)
POCT GLUCOSE: 202 MG/DL (ref 70–110)
POCT GLUCOSE: 212 MG/DL (ref 70–110)
POCT GLUCOSE: 217 MG/DL (ref 70–110)
POCT GLUCOSE: 229 MG/DL (ref 70–110)
POCT GLUCOSE: 237 MG/DL (ref 70–110)
POCT GLUCOSE: 262 MG/DL (ref 70–110)
POCT GLUCOSE: 262 MG/DL (ref 70–110)
POCT GLUCOSE: 264 MG/DL (ref 70–110)
POCT GLUCOSE: 279 MG/DL (ref 70–110)
POCT GLUCOSE: 286 MG/DL (ref 70–110)
POCT GLUCOSE: 295 MG/DL (ref 70–110)
POCT GLUCOSE: 308 MG/DL (ref 70–110)
POCT GLUCOSE: 314 MG/DL (ref 70–110)
POCT GLUCOSE: 315 MG/DL (ref 70–110)
POCT GLUCOSE: 383 MG/DL (ref 70–110)
POCT GLUCOSE: 424 MG/DL (ref 70–110)
POCT GLUCOSE: 482 MG/DL (ref 70–110)
POCT GLUCOSE: >500 MG/DL (ref 70–110)
POTASSIUM SERPL-SCNC: 4 MMOL/L (ref 3.5–5.1)
POTASSIUM SERPL-SCNC: 4.5 MMOL/L (ref 3.5–5.1)
POTASSIUM SERPL-SCNC: 4.5 MMOL/L (ref 3.5–5.1)
POTASSIUM SERPL-SCNC: 5.1 MMOL/L (ref 3.5–5.1)
POTASSIUM SERPL-SCNC: 5.7 MMOL/L (ref 3.5–5.1)
PROCALCITONIN SERPL IA-MCNC: 0.71 NG/ML
PROT SERPL-MCNC: 9.4 G/DL (ref 6–8.4)
SAMPLE: ABNORMAL
SARS-COV-2 RDRP RESP QL NAA+PROBE: NEGATIVE
SITE: ABNORMAL
SODIUM SERPL-SCNC: 131 MMOL/L (ref 136–145)
SODIUM SERPL-SCNC: 135 MMOL/L (ref 136–145)
SODIUM SERPL-SCNC: 136 MMOL/L (ref 136–145)
SODIUM SERPL-SCNC: 138 MMOL/L (ref 136–145)
SODIUM SERPL-SCNC: 138 MMOL/L (ref 136–145)
TOXICOLOGY INFORMATION: ABNORMAL
TROPONIN I SERPL DL<=0.01 NG/ML-MCNC: 0.01 NG/ML (ref 0–0.03)

## 2022-09-17 PROCEDURE — 80048 BASIC METABOLIC PNL TOTAL CA: CPT | Performed by: INTERNAL MEDICINE

## 2022-09-17 PROCEDURE — 94640 AIRWAY INHALATION TREATMENT: CPT

## 2022-09-17 PROCEDURE — 80048 BASIC METABOLIC PNL TOTAL CA: CPT | Mod: 91 | Performed by: STUDENT IN AN ORGANIZED HEALTH CARE EDUCATION/TRAINING PROGRAM

## 2022-09-17 PROCEDURE — 80307 DRUG TEST PRSMV CHEM ANLYZR: CPT | Performed by: INTERNAL MEDICINE

## 2022-09-17 PROCEDURE — 83735 ASSAY OF MAGNESIUM: CPT | Performed by: INTERNAL MEDICINE

## 2022-09-17 PROCEDURE — U0002 COVID-19 LAB TEST NON-CDC: HCPCS | Performed by: EMERGENCY MEDICINE

## 2022-09-17 PROCEDURE — 99900035 HC TECH TIME PER 15 MIN (STAT)

## 2022-09-17 PROCEDURE — 83036 HEMOGLOBIN GLYCOSYLATED A1C: CPT | Performed by: INTERNAL MEDICINE

## 2022-09-17 PROCEDURE — 93010 ELECTROCARDIOGRAM REPORT: CPT | Mod: ,,, | Performed by: INTERNAL MEDICINE

## 2022-09-17 PROCEDURE — 25000003 PHARM REV CODE 250: Performed by: INTERNAL MEDICINE

## 2022-09-17 PROCEDURE — S5010 5% DEXTROSE AND 0.45% SALINE: HCPCS | Performed by: INTERNAL MEDICINE

## 2022-09-17 PROCEDURE — 93010 EKG 12-LEAD: ICD-10-PCS | Mod: ,,, | Performed by: INTERNAL MEDICINE

## 2022-09-17 PROCEDURE — 96365 THER/PROPH/DIAG IV INF INIT: CPT

## 2022-09-17 PROCEDURE — 63600175 PHARM REV CODE 636 W HCPCS: Performed by: INTERNAL MEDICINE

## 2022-09-17 PROCEDURE — 83605 ASSAY OF LACTIC ACID: CPT | Mod: 91 | Performed by: INTERNAL MEDICINE

## 2022-09-17 PROCEDURE — 36415 COLL VENOUS BLD VENIPUNCTURE: CPT | Performed by: STUDENT IN AN ORGANIZED HEALTH CARE EDUCATION/TRAINING PROGRAM

## 2022-09-17 PROCEDURE — 84145 PROCALCITONIN (PCT): CPT | Performed by: EMERGENCY MEDICINE

## 2022-09-17 PROCEDURE — 82803 BLOOD GASES ANY COMBINATION: CPT

## 2022-09-17 PROCEDURE — 83605 ASSAY OF LACTIC ACID: CPT | Performed by: INTERNAL MEDICINE

## 2022-09-17 PROCEDURE — 96375 TX/PRO/DX INJ NEW DRUG ADDON: CPT

## 2022-09-17 PROCEDURE — 96361 HYDRATE IV INFUSION ADD-ON: CPT

## 2022-09-17 PROCEDURE — 93005 ELECTROCARDIOGRAM TRACING: CPT

## 2022-09-17 PROCEDURE — 83735 ASSAY OF MAGNESIUM: CPT | Mod: 91 | Performed by: STUDENT IN AN ORGANIZED HEALTH CARE EDUCATION/TRAINING PROGRAM

## 2022-09-17 PROCEDURE — 63600175 PHARM REV CODE 636 W HCPCS: Performed by: EMERGENCY MEDICINE

## 2022-09-17 PROCEDURE — 83690 ASSAY OF LIPASE: CPT | Performed by: INTERNAL MEDICINE

## 2022-09-17 PROCEDURE — 25000242 PHARM REV CODE 250 ALT 637 W/ HCPCS: Performed by: INTERNAL MEDICINE

## 2022-09-17 PROCEDURE — 20000000 HC ICU ROOM

## 2022-09-17 PROCEDURE — 25000003 PHARM REV CODE 250: Performed by: EMERGENCY MEDICINE

## 2022-09-17 PROCEDURE — 87040 BLOOD CULTURE FOR BACTERIA: CPT | Mod: 59 | Performed by: EMERGENCY MEDICINE

## 2022-09-17 PROCEDURE — 83605 ASSAY OF LACTIC ACID: CPT | Mod: 91 | Performed by: EMERGENCY MEDICINE

## 2022-09-17 PROCEDURE — 63600175 PHARM REV CODE 636 W HCPCS

## 2022-09-17 PROCEDURE — 84484 ASSAY OF TROPONIN QUANT: CPT | Performed by: EMERGENCY MEDICINE

## 2022-09-17 PROCEDURE — 84100 ASSAY OF PHOSPHORUS: CPT | Performed by: INTERNAL MEDICINE

## 2022-09-17 PROCEDURE — 83605 ASSAY OF LACTIC ACID: CPT

## 2022-09-17 PROCEDURE — 36415 COLL VENOUS BLD VENIPUNCTURE: CPT | Performed by: INTERNAL MEDICINE

## 2022-09-17 RX ORDER — SODIUM CHLORIDE 0.9 % (FLUSH) 0.9 %
10 SYRINGE (ML) INJECTION
Status: DISCONTINUED | OUTPATIENT
Start: 2022-09-17 | End: 2022-09-20 | Stop reason: HOSPADM

## 2022-09-17 RX ORDER — POLYETHYLENE GLYCOL 3350 17 G/17G
17 POWDER, FOR SOLUTION ORAL DAILY
Status: DISCONTINUED | OUTPATIENT
Start: 2022-09-17 | End: 2022-09-20 | Stop reason: HOSPADM

## 2022-09-17 RX ORDER — ATORVASTATIN CALCIUM 10 MG/1
20 TABLET, FILM COATED ORAL DAILY
Status: DISCONTINUED | OUTPATIENT
Start: 2022-09-17 | End: 2022-09-20 | Stop reason: HOSPADM

## 2022-09-17 RX ORDER — TALC
6 POWDER (GRAM) TOPICAL NIGHTLY PRN
Status: DISCONTINUED | OUTPATIENT
Start: 2022-09-17 | End: 2022-09-20 | Stop reason: HOSPADM

## 2022-09-17 RX ORDER — IPRATROPIUM BROMIDE AND ALBUTEROL SULFATE 2.5; .5 MG/3ML; MG/3ML
3 SOLUTION RESPIRATORY (INHALATION) EVERY 4 HOURS PRN
Status: DISCONTINUED | OUTPATIENT
Start: 2022-09-17 | End: 2022-09-19

## 2022-09-17 RX ORDER — GABAPENTIN 300 MG/1
300 CAPSULE ORAL 3 TIMES DAILY
Status: DISCONTINUED | OUTPATIENT
Start: 2022-09-17 | End: 2022-09-20 | Stop reason: HOSPADM

## 2022-09-17 RX ORDER — HALOPERIDOL 5 MG/ML
1 INJECTION INTRAMUSCULAR ONCE
Status: COMPLETED | OUTPATIENT
Start: 2022-09-17 | End: 2022-09-17

## 2022-09-17 RX ORDER — CLOPIDOGREL BISULFATE 75 MG/1
75 TABLET ORAL DAILY
Status: DISCONTINUED | OUTPATIENT
Start: 2022-09-17 | End: 2022-09-20 | Stop reason: HOSPADM

## 2022-09-17 RX ORDER — NAPROXEN SODIUM 220 MG/1
81 TABLET, FILM COATED ORAL DAILY
Status: DISCONTINUED | OUTPATIENT
Start: 2022-09-17 | End: 2022-09-20 | Stop reason: HOSPADM

## 2022-09-17 RX ORDER — NITROGLYCERIN 0.4 MG/1
0.4 TABLET SUBLINGUAL EVERY 5 MIN PRN
Status: DISCONTINUED | OUTPATIENT
Start: 2022-09-17 | End: 2022-09-20 | Stop reason: HOSPADM

## 2022-09-17 RX ORDER — SODIUM CHLORIDE 9 MG/ML
125 INJECTION, SOLUTION INTRAVENOUS CONTINUOUS
Status: DISCONTINUED | OUTPATIENT
Start: 2022-09-17 | End: 2022-09-20

## 2022-09-17 RX ORDER — AMLODIPINE BESYLATE 5 MG/1
10 TABLET ORAL DAILY
Status: DISCONTINUED | OUTPATIENT
Start: 2022-09-17 | End: 2022-09-20 | Stop reason: HOSPADM

## 2022-09-17 RX ORDER — AMOXICILLIN 250 MG
1 CAPSULE ORAL 2 TIMES DAILY PRN
Status: DISCONTINUED | OUTPATIENT
Start: 2022-09-17 | End: 2022-09-20 | Stop reason: HOSPADM

## 2022-09-17 RX ORDER — LORAZEPAM 2 MG/ML
INJECTION INTRAMUSCULAR
Status: COMPLETED
Start: 2022-09-17 | End: 2022-09-17

## 2022-09-17 RX ORDER — ACETAMINOPHEN 325 MG/1
650 TABLET ORAL EVERY 4 HOURS PRN
Status: DISCONTINUED | OUTPATIENT
Start: 2022-09-17 | End: 2022-09-20 | Stop reason: HOSPADM

## 2022-09-17 RX ORDER — OXYCODONE HYDROCHLORIDE 5 MG/1
5 TABLET ORAL EVERY 8 HOURS PRN
Status: DISCONTINUED | OUTPATIENT
Start: 2022-09-17 | End: 2022-09-20

## 2022-09-17 RX ORDER — QUETIAPINE FUMARATE 25 MG/1
50 TABLET, FILM COATED ORAL NIGHTLY
Status: DISCONTINUED | OUTPATIENT
Start: 2022-09-17 | End: 2022-09-20 | Stop reason: HOSPADM

## 2022-09-17 RX ORDER — DIPHENHYDRAMINE HCL 25 MG
25 CAPSULE ORAL EVERY 6 HOURS PRN
Status: DISCONTINUED | OUTPATIENT
Start: 2022-09-17 | End: 2022-09-20 | Stop reason: HOSPADM

## 2022-09-17 RX ORDER — PROCHLORPERAZINE EDISYLATE 5 MG/ML
5 INJECTION INTRAMUSCULAR; INTRAVENOUS EVERY 6 HOURS PRN
Status: DISCONTINUED | OUTPATIENT
Start: 2022-09-17 | End: 2022-09-20 | Stop reason: HOSPADM

## 2022-09-17 RX ORDER — DEXTROSE MONOHYDRATE AND SODIUM CHLORIDE 5; .45 G/100ML; G/100ML
125 INJECTION, SOLUTION INTRAVENOUS CONTINUOUS PRN
Status: DISCONTINUED | OUTPATIENT
Start: 2022-09-17 | End: 2022-09-18

## 2022-09-17 RX ORDER — LORAZEPAM 2 MG/ML
2 INJECTION INTRAMUSCULAR EVERY 4 HOURS PRN
Status: DISCONTINUED | OUTPATIENT
Start: 2022-09-17 | End: 2022-09-20

## 2022-09-17 RX ORDER — ONDANSETRON 2 MG/ML
4 INJECTION INTRAMUSCULAR; INTRAVENOUS EVERY 6 HOURS PRN
Status: DISCONTINUED | OUTPATIENT
Start: 2022-09-17 | End: 2022-09-20 | Stop reason: HOSPADM

## 2022-09-17 RX ORDER — IPRATROPIUM BROMIDE AND ALBUTEROL SULFATE 2.5; .5 MG/3ML; MG/3ML
3 SOLUTION RESPIRATORY (INHALATION) EVERY 8 HOURS
Status: DISCONTINUED | OUTPATIENT
Start: 2022-09-17 | End: 2022-09-18

## 2022-09-17 RX ORDER — ENOXAPARIN SODIUM 100 MG/ML
40 INJECTION SUBCUTANEOUS EVERY 24 HOURS
Status: DISCONTINUED | OUTPATIENT
Start: 2022-09-17 | End: 2022-09-20 | Stop reason: HOSPADM

## 2022-09-17 RX ADMIN — IPRATROPIUM BROMIDE AND ALBUTEROL SULFATE 3 ML: 2.5; .5 SOLUTION RESPIRATORY (INHALATION) at 04:09

## 2022-09-17 RX ADMIN — TRAZODONE HYDROCHLORIDE 25 MG: 50 TABLET ORAL at 03:09

## 2022-09-17 RX ADMIN — HALOPERIDOL LACTATE 1 MG: 5 INJECTION, SOLUTION INTRAMUSCULAR at 09:09

## 2022-09-17 RX ADMIN — QUETIAPINE 50 MG: 25 TABLET ORAL at 03:09

## 2022-09-17 RX ADMIN — OXYCODONE 5 MG: 5 TABLET ORAL at 02:09

## 2022-09-17 RX ADMIN — SODIUM CHLORIDE 125 ML/HR: 0.9 INJECTION, SOLUTION INTRAVENOUS at 11:09

## 2022-09-17 RX ADMIN — SODIUM CHLORIDE 125 ML/HR: 0.9 INJECTION, SOLUTION INTRAVENOUS at 03:09

## 2022-09-17 RX ADMIN — QUETIAPINE 50 MG: 25 TABLET ORAL at 09:09

## 2022-09-17 RX ADMIN — GABAPENTIN 300 MG: 300 CAPSULE ORAL at 09:09

## 2022-09-17 RX ADMIN — AMLODIPINE BESYLATE 10 MG: 5 TABLET ORAL at 04:09

## 2022-09-17 RX ADMIN — DEXTROSE AND SODIUM CHLORIDE 125 ML/HR: 5; .45 INJECTION, SOLUTION INTRAVENOUS at 12:09

## 2022-09-17 RX ADMIN — SODIUM CHLORIDE 4 UNITS/HR: 9 INJECTION, SOLUTION INTRAVENOUS at 03:09

## 2022-09-17 RX ADMIN — TRAZODONE HYDROCHLORIDE 25 MG: 50 TABLET ORAL at 09:09

## 2022-09-17 RX ADMIN — PIPERACILLIN AND TAZOBACTAM 4.5 G: 4; .5 INJECTION, POWDER, LYOPHILIZED, FOR SOLUTION INTRAVENOUS; PARENTERAL at 02:09

## 2022-09-17 RX ADMIN — LORAZEPAM 2 MG: 2 INJECTION INTRAMUSCULAR; INTRAVENOUS at 09:09

## 2022-09-17 RX ADMIN — GABAPENTIN 300 MG: 300 CAPSULE ORAL at 02:09

## 2022-09-17 RX ADMIN — IPRATROPIUM BROMIDE AND ALBUTEROL SULFATE 3 ML: 2.5; .5 SOLUTION RESPIRATORY (INHALATION) at 08:09

## 2022-09-17 RX ADMIN — LORAZEPAM 2 MG: 2 INJECTION INTRAMUSCULAR at 09:09

## 2022-09-17 RX ADMIN — GABAPENTIN 300 MG: 300 CAPSULE ORAL at 08:09

## 2022-09-17 RX ADMIN — ONDANSETRON 4 MG: 2 INJECTION INTRAMUSCULAR; INTRAVENOUS at 02:09

## 2022-09-17 NOTE — ED NOTES
To  with nurse on monitor, infusions infusing via pump.  Pt placed on bed with ICU nurses x 2 at BS

## 2022-09-17 NOTE — NURSING
[facility] [place]  Nurses Note -- 4 Eyes      9/17/2022       Skin assessed on: Q Shift      [x] No Pressure Injuries Present    []Prevention Measures Documented    [] Yes LDA  for Pressure Injury Previously documented     [] Yes New Pressure Injury Discovered   [] LDA for New Pressure Injury Added      Attending RN:  Crys Lancaster RN     Second RN:  Niharika SALINAS

## 2022-09-17 NOTE — HPI
"Mr. Mabyr is a 56yo man with a past medical history of HTN, IV heroin abuse, DM1 with DKA, Hep C, pancreatitis, and COPD due to long term cigarette smoking.  He is known to me from admitting him on 8/7/22 for COPD exacerbation to .    I examined and interviewed him in the ED.  He is very lethargic, and will only answer simple questions, sometimes refusing altogether.  He states, "I feel like death."  When asked how exactly, he responds, "I don't know, just not good."  I was able to gather that he hurts all over, had some N/V for several days and vague abdominal discomfort.  Of note, he stopped taking all of his insulin over a week ago (he would not tell me why).  I did ask if he was still using IV heroin, but he said, "not for a year now."    In the ED his VSs were BP (!) 185/88   Pulse (!) 111   Temp 98.7 °F (37.1 °C) (Oral)   Resp (!) 22   Ht 5' 8" (1.727 m)   Wt 65.8 kg (145 lb)   SpO2 100%   BMI 22.05 kg/m².  His labs showed a normal CBC, Na 131, K 5.7, AG 32, BUN 27, Cr 1.9 (baseline ) glucose 736, and normal LFT.  Trop 0.006, LA 2.8, BHB 6.3.  COVID/FLU NEG, ETOH NEG, VBG pH 7.059, PCO2 40.    CXR showed no acute abnormalities.  EKG showed sinus tachycardia with occasional Premature ventricular complexes, rate 111, right atrial enlargement, and possible Anterior infarct ,age undetermined.    In the ED he was treated with Insulin %U IV 2344, Zofran 4mg iv 2339, Zosyn 4.5g iv 0201, NS 1L bolus 0111 & 2340.                 "

## 2022-09-17 NOTE — HOSPITAL COURSE
Mr. Mabry was admitted to ICU with DKA. Started on DKA pathway. Pt became agitated overnight and was PEC'd. DKA resolved and transitioned to basal bolus insulin regimen. Evaluated by Psychiatry. His behaviors have slowly improved. PEC rescinded. Stable for discharge to home. Follow up with PCP.

## 2022-09-17 NOTE — CARE UPDATE
[facility] Intensive Care  ICU Shift Summary  Date: 9/17/2022      Prehospitalization: Home  Admit Date / LOS : 9/16/2022/ 0 days    Diagnosis:   Diabetic ketoacidosis without coma associated with type 1 diabetes mellitus    Consults:        Active: N/A       Needed: N/A     Code Status: Full Code   Advanced Directive: <no information>    LDA:  Lines/Drains/Airways       Drain  Duration             Male External Urinary Catheter 09/17/22 0545 Medium <1 day              Peripheral Intravenous Line  Duration                  Peripheral IV - Single Lumen 09/16/22 2334 22 G Anterior;Left Hand <1 day         Peripheral IV - Single Lumen 09/17/22 0030 22 G Anterior;Right Hand <1 day         Peripheral IV - Single Lumen 09/17/22 0411 22 G Anterior;Left Other <1 day                  Central Lines/Site/Justification:Patient Does Not Have Central Line  Urinary Cath/Order/Justification:Patient Does Not Have Urinary Catheter    Vasopressors/Infusions:    sodium chloride 0.9% Stopped (09/17/22 1228)    dextrose 5 % and 0.45 % NaCl 125 mL/hr (09/17/22 1553)    insulin regular 1 units/mL infusion orderable (DKA) 0.85 Units/hr (09/17/22 1609)          GOALS: Volume/ Hemodynamic: N/A                     RASS: 0  alert and calm    Pain Management: PO       Pain Controlled: yes     Rhythm: ST    Respiratory Device: Room Air                  Most Recent SBT/ SAT: N/A       MOVE Screen: PASS  ICU Liberation: not applicable    VTE Prophylaxis: Pharm  Mobility: OOB to Chair  Stress Ulcer Prophylaxis: No    Isolation: No active isolations    Dietary:   Current Diet Order   Procedures    Diet Clear liquid (no sugar)/Bariatric      Tolerance: yes  Advancement: @ goal    I & O (24h):    Intake/Output Summary (Last 24 hours) at 9/17/2022 1742  Last data filed at 9/17/2022 1553  Gross per 24 hour   Intake 3375.89 ml   Output --   Net 3375.89 ml        Restraints: No    Significant Dates:  Post Op Date: N/A  Rescue Date: N/A  Imaging/  Diagnostics: N/A    Noteworthy Labs:  none    COVID Test: (--)  CBC/Anemia Labs: Coags:    Recent Labs   Lab 09/16/22  2335   WBC 11.04   HGB 15.0   HCT 46.8      MCV 86   RDW 14.1    No results for input(s): PT, INR, APTT in the last 168 hours.     Chemistries:   Recent Labs   Lab 09/16/22  2335 09/17/22  0300 09/17/22  0709 09/17/22  1102 09/17/22  1457   * 136   < > 138 135*   K 5.7* 5.1   < > 4.5 4.0   CL 94* 101   < > 109 108   CO2 5* 5*   < > 14* 15*   BUN 27* 27*   < > 25* 22*   CREATININE 1.9* 1.8*   < > 1.6* 1.4   CALCIUM 10.1 9.0   < > 8.9 7.8*   PROT 9.4*  --   --   --   --    BILITOT 0.6  --   --   --   --    ALKPHOS 132  --   --   --   --    ALT 21  --   --   --   --    AST 20  --   --   --   --    MG  --  2.2  --   --   --    PHOS  --  4.6*  --   --   --     < > = values in this interval not displayed.        Cardiac Enzymes: Ejection Fractions:    Recent Labs     09/17/22  0053   TROPONINI 0.006    Nuc Rest EF   Date Value Ref Range Status   03/15/2019 53  Final        POCT Glucose: HbA1c:    Recent Labs   Lab 09/17/22  1456 09/17/22  1606 09/17/22  1706   POCTGLUCOSE 279* 237* 229*    Hemoglobin A1C   Date Value Ref Range Status   09/17/2022 10.8 (H) 4.0 - 5.6 % Final     Comment:     ADA Screening Guidelines:  5.7-6.4%  Consistent with prediabetes  >or=6.5%  Consistent with diabetes    High levels of fetal hemoglobin interfere with the HbA1C  assay. Heterozygous hemoglobin variants (HbS, HgC, etc)do  not significantly interfere with this assay.   However, presence of multiple variants may affect accuracy.             ICU LOS 12h  Level of Care: Critical Care    Chart Check: 12 HR Done  Shift Summary/Plan for the shift: see care plan

## 2022-09-17 NOTE — CARE UPDATE
[facility]  Nurses Note -- 4 Eyes      9/17/2022       Skin assessed on: Admit      [x] No Pressure Injuries Present    [x]Prevention Measures Documented    [] Yes LDA  for Pressure Injury Previously documented     [] Yes New Pressure Injury Discovered   [] LDA for New Pressure Injury Added      Attending RN:  Erik Cloon Jr., RN

## 2022-09-17 NOTE — H&P
"[facility] Medicine  History & Physical    Patient Name: Cali Mabry  MRN: [id number]  Patient Class: IP- Inpatient  Admission Date: 9/16/2022  Attending Physician: Mahamed Garcia MD   Primary Care Provider: Primary Doctor No         Patient information was obtained from patient, past medical records and ER records.     Subjective:     Principal Problem:Diabetic ketoacidosis without coma associated with type 1 diabetes mellitus    Chief Complaint:   Chief Complaint   Patient presents with    Hyperglycemia     Pt c/o hyperglycemia with vomiting x2 hours. Per EMS, pt was seen by PCP today for insulin refill but advised by pharmacy that Rx is unavailable until 09/19. EMS states "pt BGL noted as "HI" (> 600) and refused attempts for IV access; pt began non-productive vomiting on arrival at ED." Pt not answering questions at ED due to vomiting and yelling.        HPI: Mr. Mabry is a 56yo man with a past medical history of HTN, IV heroin abuse, DM1 with DKA, Hep C, pancreatitis, and COPD due to long term cigarette smoking.  He is known to me from admitting him on 8/7/22 for COPD exacerbation to .    I examined and interviewed him in the ED.  He is very lethargic, and will only answer simple questions, sometimes refusing altogether.  He states, "I feel like death."  When asked how exactly, he responds, "I don't know, just not good."  I was able to gather that he hurts all over, had some N/V for several days and vague abdominal discomfort.  Of note, he stopped taking all of his insulin over a week ago (he would not tell me why).  I did ask if he was still using IV heroin, but he said, "not for a year now."    In the ED his VSs were BP (!) 185/88   Pulse (!) 111   Temp 98.7 °F (37.1 °C) (Oral)   Resp (!) 22   Ht 5' 8" (1.727 m)   Wt 65.8 kg (145 lb)   SpO2 100%   BMI 22.05 kg/m².  His labs showed a normal CBC, Na 131, K 5.7, AG 32, BUN 27, Cr 1.9 (baseline ) glucose 736, and " normal LFT.  Trop 0.006, LA 2.8, BHB 6.3.  COVID/FLU NEG, ETOH NEG, VBG pH 7.059, PCO2 40.    CXR showed no acute abnormalities.  EKG showed sinus tachycardia with occasional Premature ventricular complexes, rate 111, right atrial enlargement, and possible Anterior infarct ,age undetermined.    In the ED he was treated with Insulin %U IV 2344, Zofran 4mg iv 2339, Zosyn 4.5g iv 0201, NS 1L bolus 0111 & 2340.                     Past Medical History:   Diagnosis Date    Acute on chronic pancreatitis 4/3/2016    CHF (congestive heart failure)     DKA (diabetic ketoacidoses) 02/2020    DM (diabetes mellitus), type 1     HCV (hepatitis C virus)     high VL     HTN (hypertension)     Hypomagnesemia 5/25/2020    IVDU (intravenous drug user)     Heroin    Pancreatitis        Past Surgical History:   Procedure Laterality Date    CARDIAC SURGERY  1999    stent placed. ([facility])    ESOPHAGOGASTRODUODENOSCOPY N/A 3/5/2020    Gastritis.  No H pylori.  Completed PPI for 1 month.  Procedure: EGD (ESOPHAGOGASTRODUODENOSCOPY);  Surgeon: Brinda Rene MD;  Location: [facility] ENDO;  Service: Endoscopy;  Laterality: N/A;  [id number] A; [phone]    LEFT HEART CATHETERIZATION Left 9/28/2020    Procedure: Left heart cath;  Surgeon: Fito Rangel MD;  Location: [facility] CATH LAB;  Service: Cardiology;  Laterality: Left;    SHOULDER SURGERY  2013    right shoulder, spur removal.       Review of patient's allergies indicates:  No Known Allergies    No current facility-administered medications on file prior to encounter.     Current Outpatient Medications on File Prior to Encounter   Medication Sig    albuterol (PROVENTIL/VENTOLIN HFA) 90 mcg/actuation inhaler Inhale 2 puffs into the lungs every 4 (four) hours as needed for Wheezing or Shortness of Breath. Rescue    aspirin 81 MG Chew Take 1 tablet (81 mg total) by mouth once daily.    atorvastatin (LIPITOR) 20 MG tablet Take 20 mg by mouth once daily.    b complex vitamins  "tablet Take 1 tablet by mouth once daily.    bisacodyL (DULCOLAX) 10 mg Supp Place 10 mg rectally daily as needed.    blood sugar diagnostic Strp Use To check blood glucose three times daily,    blood-glucose meter Misc To check Blood glucose three times daily,    calcium carbonate (TUMS) 200 mg calcium (500 mg) chewable tablet Take 1 tablet (500 mg total) by mouth daily as needed for Heartburn.    clopidogreL (PLAVIX) 75 mg tablet Take 1 tablet (75 mg total) by mouth once daily. (Patient not taking: Reported on 9/2/2022)    diphenhydrAMINE (BENADRYL) 25 mg capsule Take 25 mg by mouth every 6 (six) hours as needed for Itching.    DULoxetine (CYMBALTA) 60 MG capsule Take 1 capsule (60 mg total) by mouth once daily. (Patient not taking: Reported on 9/2/2022)    gabapentin (NEURONTIN) 300 MG capsule Take 2 capsules (600 mg total) by mouth 3 (three) times daily. (Patient not taking: Reported on 9/16/2022)    glucagon (GLUCAGON, HUMAN RECOMBINANT,) 1 mg SolR Inject 1 mg into the muscle as needed.    glucose 4 GM chewable tablet Take 16 g by mouth as needed for Low blood sugar.    HALOPERIDOL LACTATE INJ Inject 1 mg as directed every 4 (four) hours as needed.    insulin (LANTUS SOLOSTAR U-100 INSULIN) glargine 100 units/mL (3mL) SubQ pen Inject 42 Units into the skin every evening.    insulin lispro 100 unit/mL pen Inject 10 Units into the skin 3 (three) times daily with meals.    insulin regular (HUMULIN R REGULAR U-100 INSULN) 100 unit/mL Inj injection Inject 6 Units into the skin 3 (three) times daily before meals.    insulin syringe-needle U-100 0.5 mL 31 gauge x 5/16" Syrg 1 each by Misc.(Non-Drug; Combo Route) route 3 (three) times daily.    ketorolac (TORADOL) 10 mg tablet Take 1 tablet (10 mg total) by mouth every 6 (six) hours as needed. (Patient not taking: Reported on 9/16/2022)    L. acidophilus/Strept/La p-whitney (RISAQUAD ORAL) Take 1 capsule by mouth once daily.    lancets Misc To check BG 3 " "times daily, to use with insurance preferred meter    LIDOcaine (LIDODERM) 5 % Place 1 patch onto the skin once daily. Remove & Discard patch within 12 hours or as directed by MD (Patient not taking: Reported on 2022)    melatonin (MELATIN) 3 mg tablet Take 2 tablets (6 mg total) by mouth nightly as needed for Insomnia. (Patient not taking: Reported on 2022)    mineral oil (FLEET OIL RETENTION) enema Place 1 enema rectally daily as needed for Constipation.    naloxone (NARCAN) 4 mg/actuation Spry 4mg by nasal route as needed for opioid overdose; may repeat every 2-3 minutes in alternating nostrils until medical help arrives. Call 911    naloxone (NARCAN) 4 mg/actuation Spry 4mg by nasal route as needed for opioid overdose; may repeat every 2-3 minutes in alternating nostrils until medical help arrives. Call 911    nitroGLYCERIN (NITROSTAT) 0.4 MG SL tablet Place 1 tablet (0.4 mg total) under the tongue every 5 (five) minutes as needed for Chest pain. (Patient not taking: Reported on 2022)    pen needle, diabetic (CLICKFINE PEN NEEDLE) 32 gauge x 5/32" Ndle 1 each by Misc.(Non-Drug; Combo Route) route 4 (four) times daily before meals and nightly.    QUEtiapine (SEROQUEL) 50 MG tablet Take 50 mg by mouth every evening.    traZODone (DESYREL) 50 MG tablet Take 25 mg by mouth every evening.    [DISCONTINUED] insulin aspart U-100 (NOVOLOG) 100 unit/mL injection Inject 1-10 Units into the skin 3 (three) times daily with meals.     Family History       Problem Relation (Age of Onset)    Asthma Mother          Tobacco Use    Smoking status: Every Day     Packs/day: 1.00     Years: 20.00     Pack years: 20.00     Types: Cigarettes     Start date: 4/3/1981     Last attempt to quit: 2020     Years since quittin.2    Smokeless tobacco: Never    Tobacco comments:     states approx 1 per day.   Substance and Sexual Activity    Alcohol use: Not Currently     Alcohol/week: 0.0 standard drinks "    Drug use: Yes     Types: Heroin, IV, Marijuana    Sexual activity: Yes     Partners: Female     Review of Systems   Constitutional:  Positive for activity change, appetite change, diaphoresis and fatigue. Negative for fever.   HENT:  Negative for congestion.    Eyes:  Positive for visual disturbance.   Respiratory:  Negative for shortness of breath.    Cardiovascular:  Negative for chest pain.   Gastrointestinal:  Positive for abdominal pain, nausea and vomiting.   Endocrine: Positive for polyuria.   Genitourinary:  Negative for dysuria.   Musculoskeletal:  Positive for myalgias.   Skin:  Negative for rash.   Allergic/Immunologic: Negative for immunocompromised state.   Neurological:  Positive for weakness.   Hematological:  Does not bruise/bleed easily.   Psychiatric/Behavioral:  Positive for behavioral problems, decreased concentration, dysphoric mood and sleep disturbance.    Objective:     Vital Signs (Most Recent):  Temp: 98.7 °F (37.1 °C) (09/17/22 0030)  Pulse: (!) 111 (09/17/22 0042)  Resp: (!) 22 (09/17/22 0042)  BP: (!) 185/88 (09/17/22 0041)  SpO2: 100 % (09/17/22 0042)   Vital Signs (24h Range):  Temp:  [98.7 °F (37.1 °C)] 98.7 °F (37.1 °C)  Pulse:  [109-111] 111  Resp:  [18-22] 22  SpO2:  [100 %] 100 %  BP: (140-185)/(88-92) 185/88     Weight: 65.8 kg (145 lb)  Body mass index is 22.05 kg/m².    Physical Exam  Constitutional:       General: He is sleeping. He is in acute distress.      Appearance: He is underweight. He is ill-appearing, toxic-appearing and diaphoretic.   HENT:      Head: Normocephalic and atraumatic.   Eyes:      Conjunctiva/sclera:      Right eye: Right conjunctiva is injected.      Left eye: Left conjunctiva is injected.   Neck:      Thyroid: No thyromegaly.   Cardiovascular:      Rate and Rhythm: Regular rhythm. Tachycardia present.      Heart sounds:   No systolic murmur is present.   Pulmonary:      Effort: Tachypnea present.      Breath sounds: No decreased breath sounds,  wheezing, rhonchi or rales.   Chest:      Chest wall: No swelling or tenderness.   Abdominal:      General: Abdomen is flat. Bowel sounds are decreased. There is no distension.      Palpations: Abdomen is soft.      Tenderness: There is generalized abdominal tenderness.   Genitourinary:     Comments: No pena in place  Lymphadenopathy:      Comments: No peripheral edema   Skin:     General: Skin is cool and moist.   Neurological:      General: No focal deficit present.      Mental Status: He is oriented to person, place, and time. He is lethargic.      GCS: GCS eye subscore is 4. GCS verbal subscore is 5. GCS motor subscore is 6.   Psychiatric:         Attention and Perception: He is inattentive.         Mood and Affect: Mood is depressed. Affect is labile, blunt, flat and angry.         Speech: Speech is delayed, slurred and tangential.         Behavior: Behavior is uncooperative, slowed and withdrawn.           Significant Labs: All pertinent labs within the past 24 hours have been reviewed.  Recent Results (from the past 24 hour(s))   POCT glucose    Collection Time: 09/16/22 10:44 PM   Result Value Ref Range    POCT Glucose >500 (H) 70 - 110 mg/dL   CBC auto differential    Collection Time: 09/16/22 11:35 PM   Result Value Ref Range    WBC 11.04 3.90 - 12.70 K/uL    RBC 5.45 4.60 - 6.20 M/uL    Hemoglobin 15.0 14.0 - 18.0 g/dL    Hematocrit 46.8 40.0 - 54.0 %    MCV 86 82 - 98 fL    MCH 27.5 27.0 - 31.0 pg    MCHC 32.1 32.0 - 36.0 g/dL    RDW 14.1 11.5 - 14.5 %    Platelets 260 150 - 450 K/uL    MPV 10.1 9.2 - 12.9 fL    Immature Granulocytes 0.5 0.0 - 0.5 %    Gran # (ANC) 8.5 (H) 1.8 - 7.7 K/uL    Immature Grans (Abs) 0.06 (H) 0.00 - 0.04 K/uL    Lymph # 1.5 1.0 - 4.8 K/uL    Mono # 0.9 0.3 - 1.0 K/uL    Eos # 0.0 0.0 - 0.5 K/uL    Baso # 0.02 0.00 - 0.20 K/uL    nRBC 0 0 /100 WBC    Gran % 77.0 (H) 38.0 - 73.0 %    Lymph % 13.4 (L) 18.0 - 48.0 %    Mono % 8.5 4.0 - 15.0 %    Eosinophil % 0.4 0.0 - 8.0 %     Basophil % 0.2 0.0 - 1.9 %    Differential Method Automated    Comprehensive metabolic panel    Collection Time: 09/16/22 11:35 PM   Result Value Ref Range    Sodium 131 (L) 136 - 145 mmol/L    Potassium 5.7 (H) 3.5 - 5.1 mmol/L    Chloride 94 (L) 95 - 110 mmol/L    CO2 5 (LL) 23 - 29 mmol/L    Glucose 736 (HH) 70 - 110 mg/dL    BUN 27 (H) 6 - 20 mg/dL    Creatinine 1.9 (H) 0.5 - 1.4 mg/dL    Calcium 10.1 8.7 - 10.5 mg/dL    Total Protein 9.4 (H) 6.0 - 8.4 g/dL    Albumin 4.5 3.5 - 5.2 g/dL    Total Bilirubin 0.6 0.1 - 1.0 mg/dL    Alkaline Phosphatase 132 55 - 135 U/L    AST 20 10 - 40 U/L    ALT 21 10 - 44 U/L    Anion Gap 32 (H) 8 - 16 mmol/L    eGFR 41 (A) >60 mL/min/1.73 m^2   Ethanol    Collection Time: 09/16/22 11:35 PM   Result Value Ref Range    Alcohol, Serum <10 <10 mg/dL   Beta - Hydroxybutyrate, Serum    Collection Time: 09/16/22 11:35 PM   Result Value Ref Range    Beta-Hydroxybutyrate 6.3 (H) 0.0 - 0.5 mmol/L   ISTAT PROCEDURE    Collection Time: 09/17/22 12:41 AM   Result Value Ref Range    POC PH 7.059 (L) 7.35 - 7.45    POC PCO2 40.7 35 - 45 mmHg    POC PO2 26 (L) 40 - 60 mmHg    POC HCO3 11.5 (L) 24 - 28 mmol/L    POC BE -18 -2 to 2 mmol/L    POC SATURATED O2 28 (L) 95 - 100 %    POC TCO2 13 (L) 24 - 29 mmol/L    Sample VENOUS     Site Liam/UA     Allens Test N/A     DelSys Room Air    ISTAT Lactate    Collection Time: 09/17/22 12:42 AM   Result Value Ref Range    POC Lactate 3.36 (H) 0.5 - 2.2 mmol/L    Sample VENOUS     Site Liam/UA     Allens Test N/A     DelSys Room Air    Lactic acid, plasma #1    Collection Time: 09/17/22 12:53 AM   Result Value Ref Range    Lactate (Lactic Acid) 2.8 (H) 0.5 - 2.2 mmol/L   Procalcitonin    Collection Time: 09/17/22 12:53 AM   Result Value Ref Range    Procalcitonin 0.71 (H) <0.25 ng/mL   Troponin I    Collection Time: 09/17/22 12:53 AM   Result Value Ref Range    Troponin I 0.006 0.000 - 0.026 ng/mL   POCT Influenza A/B Molecular    Collection Time:  09/17/22  1:19 AM   Result Value Ref Range    POC Molecular Influenza A Ag Negative Negative, Not Reported    POC Molecular Influenza B Ag Negative Negative, Not Reported     Acceptable Yes    POCT glucose    Collection Time: 09/17/22  1:43 AM   Result Value Ref Range    POCT Glucose >500 (H) 70 - 110 mg/dL   POCT COVID-19 Rapid Screening    Collection Time: 09/17/22  1:48 AM   Result Value Ref Range    POC Rapid COVID Negative Negative     Acceptable Yes    Drug screen panel, emergency    Collection Time: 09/17/22  2:45 AM   Result Value Ref Range    Benzodiazepines Negative Negative    Methadone metabolites Negative Negative    Cocaine (Metab.) Negative Negative    Opiate Scrn, Ur Negative Negative    Barbiturate Screen, Ur Negative Negative    Amphetamine Screen, Ur Negative Negative    THC Negative Negative    Phencyclidine Negative Negative    Creatinine, Urine 15.1 (L) 23.0 - 375.0 mg/dL    Toxicology Information SEE COMMENT    POCT glucose    Collection Time: 09/17/22  3:29 AM   Result Value Ref Range    POCT Glucose >500 (H) 70 - 110 mg/dL         Significant Imaging: I have reviewed all pertinent imaging results/findings within the past 24 hours.    Assessment/Plan:     * Diabetic ketoacidosis without coma associated with type 1 diabetes mellitus  I have initiated the DKA best pathway order sets with Q4 BMP  Check BHB in 12 hours  Likely due to non-compliance, but pancreatitis is second possibility  Start Bicarb if pH <7      Patient's FSGs are uncontrolled due to hyperglycemia on current medication regimen.  Last A1c reviewed-   Lab Results   Component Value Date    HGBA1C 10.3 (H) 03/07/2022     Most recent fingerstick glucose reviewed-   Recent Labs   Lab 09/16/22  2244 09/17/22  0143 09/17/22  0329   POCTGLUCOSE >500* >500* >500*     Current correctional scale  High  Increase anti-hyperglycemic dose as follows-   Antihyperglycemics (From admission, onward)    Start     Stop  Route Frequency Ordered    09/17/22 0315  insulin regular in 0.9 % NaCl 100 unit/100 mL (1 unit/mL) infusion        Question Answer Comment   Insulin Rate Adjustment (DO NOT MODIFY ANSWER) [email or web address] [id number]    Enter initial dose from Infusion Protocol Chart (Units/hr): 4        -- IV Continuous 09/17/22 0216    09/17/22 0315  insulin regular bolus from bag/infusion 6.58 Units         -- IV Once 09/17/22 0216             RAMYA (acute kidney injury)  Patient with acute kidney injury likely due to IVVD/dehydration and pre-renal azotemia RAMYA is currently worsening. Labs reviewed- Renal function/electrolytes with Estimated Creatinine Clearance: 40.4 mL/min (A) (based on SCr of 1.9 mg/dL (H)). according to latest data. Monitor urine output and serial BMP and adjust therapy as needed. Avoid nephrotoxins and renally dose meds for GFR listed above.     I expect this will improve with hydration      Noncompliance with medication regimen  He has not taken any insulin in over a week      Hyperkalemia  I suspect this will drop rapidly with IV insulin infusion and fluids  Monitor Q4h      Essential hypertension  He is currently uncontrolled  Start Norvasc 10mg po qday    COPD (chronic obstructive pulmonary disease)    albuterol-ipratropium 2.5 mg-0.5 mg/3 mL nebulizer solution 3 mL, 3 mL, Nebulization, Q4H PRN     albuterol-ipratropium 2.5 mg-0.5 mg/3 mL nebulizer solution 3 mL, 3 mL, Nebulization, Q8H     Chronic hepatitis C  Follow up with Hepatology  H.O IVDA with heroin       Tobacco use disorder, moderate, dependence  Not counseled on cessation, as too lethargic and uncooperative      History of heroin abuse  He states that he last used over a year ago      Chronic relapsing pancreatitis  Check lipase, as he does have N/V and vague abdominal discomfort   -I suspect it is all secondary to the DKA, but to be sure  CT abdomen if worsens        VTE Risk Mitigation (From  admission, onward)         Ordered     enoxaparin injection 40 mg  Daily         09/17/22 0216     Place sequential compression device  Until discontinued         09/17/22 0216     Place DAWIT hose  Until discontinued         09/17/22 0216     IP VTE HIGH RISK PATIENT  Once         09/17/22 0216               Critical care time spent on the evaluation and treatment of severe organ dysfunction, review of pertinent labs and imaging studies, discussions with consulting providers and discussions with patient/family: 70 minutes.    JOSE ANTONIO Mendieta MD  Department of Hospital Medicine   [facility] - Emergency Dept

## 2022-09-17 NOTE — CARE UPDATE
Patient seen and examined. H&P reviewed. Updates below.    Mr. Mabry is a 56 yo M with T1DM, HTN, IV heroin use (states has not used in 1 year), Hep C/pancreatitis hx, COPD who was admitted to ICU for DKA. This appears to be due to not being able to get his insulin. There does not appear to be any signs of infection. He was started on IV insulin drip and IVF. His labs are slowly improving but he is not quite ready to stop insulin as he still has AG of 21 this morning and pH/bicarb still very low. Continue with insulin drip today. May can transition later this evening vs in AM.    Mahamed Garcia MD  Department of Hospital Medicine  [facility]

## 2022-09-17 NOTE — ASSESSMENT & PLAN NOTE
Patient with acute kidney injury likely due to IVVD/dehydration and pre-renal azotemia RAMYA is currently worsening. Labs reviewed- Renal function/electrolytes with Estimated Creatinine Clearance: 40.4 mL/min (A) (based on SCr of 1.9 mg/dL (H)). according to latest data. Monitor urine output and serial BMP and adjust therapy as needed. Avoid nephrotoxins and renally dose meds for GFR listed above.     I expect this will improve with hydration

## 2022-09-17 NOTE — PLAN OF CARE
Pt has remained on insulin drip today. He is on D5 1/2 at 125/hr. He has been sleepy thru out day but does arouse and answer appropriately. He states that he is leaving tomorrow morning no matter what.He has remained safe and free of injury.

## 2022-09-17 NOTE — ASSESSMENT & PLAN NOTE
I have initiated the DKA best pathway order sets with Q4 BMP  Check BHB in 12 hours  Likely due to non-compliance, but pancreatitis is second possibility  Start Bicarb if pH <7      Patient's FSGs are uncontrolled due to hyperglycemia on current medication regimen.  Last A1c reviewed-   Lab Results   Component Value Date    HGBA1C 10.3 (H) 03/07/2022     Most recent fingerstick glucose reviewed-   Recent Labs   Lab 09/16/22  2244 09/17/22  0143 09/17/22  0329   POCTGLUCOSE >500* >500* >500*     Current correctional scale  High  Increase anti-hyperglycemic dose as follows-   Antihyperglycemics (From admission, onward)    Start     Stop Route Frequency Ordered    09/17/22 0315  insulin regular in 0.9 % NaCl 100 unit/100 mL (1 unit/mL) infusion        Question Answer Comment   Insulin Rate Adjustment (DO NOT MODIFY ANSWER) [email or web address] [id number]    Enter initial dose from Infusion Protocol Chart (Units/hr): 4        -- IV Continuous 09/17/22 0216 09/17/22 0315  insulin regular bolus from bag/infusion 6.58 Units         -- IV Once 09/17/22 0216

## 2022-09-17 NOTE — ASSESSMENT & PLAN NOTE
Check lipase, as he does have N/V and vague abdominal discomfort   -I suspect it is all secondary to the DKA, but to be sure  CT abdomen if worsens

## 2022-09-17 NOTE — SUBJECTIVE & OBJECTIVE
Past Medical History:   Diagnosis Date    Acute on chronic pancreatitis 4/3/2016    CHF (congestive heart failure)     DKA (diabetic ketoacidoses) 02/2020    DM (diabetes mellitus), type 1     HCV (hepatitis C virus)     high VL     HTN (hypertension)     Hypomagnesemia 5/25/2020    IVDU (intravenous drug user)     Heroin    Pancreatitis        Past Surgical History:   Procedure Laterality Date    CARDIAC SURGERY  1999    stent placed. ([facility])    ESOPHAGOGASTRODUODENOSCOPY N/A 3/5/2020    Gastritis.  No H pylori.  Completed PPI for 1 month.  Procedure: EGD (ESOPHAGOGASTRODUODENOSCOPY);  Surgeon: Brinda Rene MD;  Location: [facility] ENDO;  Service: Endoscopy;  Laterality: N/A;  [id number]; [phone]    LEFT HEART CATHETERIZATION Left 9/28/2020    Procedure: Left heart cath;  Surgeon: Fito Rangel MD;  Location: [facility] CATH LAB;  Service: Cardiology;  Laterality: Left;    SHOULDER SURGERY  2013    right shoulder, spur removal.       Review of patient's allergies indicates:  No Known Allergies    No current facility-administered medications on file prior to encounter.     Current Outpatient Medications on File Prior to Encounter   Medication Sig    albuterol (PROVENTIL/VENTOLIN HFA) 90 mcg/actuation inhaler Inhale 2 puffs into the lungs every 4 (four) hours as needed for Wheezing or Shortness of Breath. Rescue    aspirin 81 MG Chew Take 1 tablet (81 mg total) by mouth once daily.    atorvastatin (LIPITOR) 20 MG tablet Take 20 mg by mouth once daily.    b complex vitamins tablet Take 1 tablet by mouth once daily.    bisacodyL (DULCOLAX) 10 mg Supp Place 10 mg rectally daily as needed.    blood sugar diagnostic Strp Use To check blood glucose three times daily,    blood-glucose meter Misc To check Blood glucose three times daily,    calcium carbonate (TUMS) 200 mg calcium (500 mg) chewable tablet Take 1 tablet (500 mg total) by mouth daily as needed for Heartburn.    clopidogreL (PLAVIX) 75 mg tablet Take 1  "tablet (75 mg total) by mouth once daily. (Patient not taking: Reported on 9/2/2022)    diphenhydrAMINE (BENADRYL) 25 mg capsule Take 25 mg by mouth every 6 (six) hours as needed for Itching.    DULoxetine (CYMBALTA) 60 MG capsule Take 1 capsule (60 mg total) by mouth once daily. (Patient not taking: Reported on 9/2/2022)    gabapentin (NEURONTIN) 300 MG capsule Take 2 capsules (600 mg total) by mouth 3 (three) times daily. (Patient not taking: Reported on 9/16/2022)    glucagon (GLUCAGON, HUMAN RECOMBINANT,) 1 mg SolR Inject 1 mg into the muscle as needed.    glucose 4 GM chewable tablet Take 16 g by mouth as needed for Low blood sugar.    HALOPERIDOL LACTATE INJ Inject 1 mg as directed every 4 (four) hours as needed.    insulin (LANTUS SOLOSTAR U-100 INSULIN) glargine 100 units/mL (3mL) SubQ pen Inject 42 Units into the skin every evening.    insulin lispro 100 unit/mL pen Inject 10 Units into the skin 3 (three) times daily with meals.    insulin regular (HUMULIN R REGULAR U-100 INSULN) 100 unit/mL Inj injection Inject 6 Units into the skin 3 (three) times daily before meals.    insulin syringe-needle U-100 0.5 mL 31 gauge x 5/16" Syrg 1 each by Misc.(Non-Drug; Combo Route) route 3 (three) times daily.    ketorolac (TORADOL) 10 mg tablet Take 1 tablet (10 mg total) by mouth every 6 (six) hours as needed. (Patient not taking: Reported on 9/16/2022)    L. acidophilus/Strept/[place] p-[place] (RISAQUAD ORAL) Take 1 capsule by mouth once daily.    lancets Misc To check BG 3 times daily, to use with insurance preferred meter    LIDOcaine (LIDODERM) 5 % Place 1 patch onto the skin once daily. Remove & Discard patch within 12 hours or as directed by MD (Patient not taking: Reported on 9/16/2022)    melatonin (MELATIN) 3 mg tablet Take 2 tablets (6 mg total) by mouth nightly as needed for Insomnia. (Patient not taking: Reported on 9/16/2022)    mineral oil (FLEET OIL RETENTION) enema Place 1 enema rectally daily as needed for " "Constipation.    naloxone (NARCAN) 4 mg/actuation Spry 4mg by nasal route as needed for opioid overdose; may repeat every 2-3 minutes in alternating nostrils until medical help arrives. Call 911    naloxone (NARCAN) 4 mg/actuation Spry 4mg by nasal route as needed for opioid overdose; may repeat every 2-3 minutes in alternating nostrils until medical help arrives. Call 911    nitroGLYCERIN (NITROSTAT) 0.4 MG SL tablet Place 1 tablet (0.4 mg total) under the tongue every 5 (five) minutes as needed for Chest pain. (Patient not taking: Reported on 2022)    pen needle, diabetic (CLICKFINE PEN NEEDLE) 32 gauge x 32" Ndle 1 each by Misc.(Non-Drug; Combo Route) route 4 (four) times daily before meals and nightly.    QUEtiapine (SEROQUEL) 50 MG tablet Take 50 mg by mouth every evening.    traZODone (DESYREL) 50 MG tablet Take 25 mg by mouth every evening.    [DISCONTINUED] insulin aspart U-100 (NOVOLOG) 100 unit/mL injection Inject 1-10 Units into the skin 3 (three) times daily with meals.     Family History       Problem Relation (Age of Onset)    Asthma Mother          Tobacco Use    Smoking status: Every Day     Packs/day: 1.00     Years: 20.00     Pack years: 20.00     Types: Cigarettes     Start date: 4/3/1981     Last attempt to quit: 2020     Years since quittin.2    Smokeless tobacco: Never    Tobacco comments:     states approx 1 per day.   Substance and Sexual Activity    Alcohol use: Not Currently     Alcohol/week: 0.0 standard drinks    Drug use: Yes     Types: Heroin, IV, Marijuana    Sexual activity: Yes     Partners: Female     Review of Systems   Constitutional:  Positive for activity change, appetite change, diaphoresis and fatigue. Negative for fever.   HENT:  Negative for congestion.    Eyes:  Positive for visual disturbance.   Respiratory:  Negative for shortness of breath.    Cardiovascular:  Negative for chest pain.   Gastrointestinal:  Positive for abdominal pain, nausea and vomiting. "   Endocrine: Positive for polyuria.   Genitourinary:  Negative for dysuria.   Musculoskeletal:  Positive for myalgias.   Skin:  Negative for rash.   Allergic/Immunologic: Negative for immunocompromised state.   Neurological:  Positive for weakness.   Hematological:  Does not bruise/bleed easily.   Psychiatric/Behavioral:  Positive for behavioral problems, decreased concentration, dysphoric mood and sleep disturbance.    Objective:     Vital Signs (Most Recent):  Temp: 98.7 °F (37.1 °C) (09/17/22 0030)  Pulse: (!) 111 (09/17/22 0042)  Resp: (!) 22 (09/17/22 0042)  BP: (!) 185/88 (09/17/22 0041)  SpO2: 100 % (09/17/22 0042)   Vital Signs (24h Range):  Temp:  [98.7 °F (37.1 °C)] 98.7 °F (37.1 °C)  Pulse:  [109-111] 111  Resp:  [18-22] 22  SpO2:  [100 %] 100 %  BP: (140-185)/(88-92) 185/88     Weight: 65.8 kg (145 lb)  Body mass index is 22.05 kg/m².    Physical Exam  Constitutional:       General: He is sleeping. He is in acute distress.      Appearance: He is underweight. He is ill-appearing, toxic-appearing and diaphoretic.   HENT:      Head: Normocephalic and atraumatic.   Eyes:      Conjunctiva/sclera:      Right eye: Right conjunctiva is injected.      Left eye: Left conjunctiva is injected.   Neck:      Thyroid: No thyromegaly.   Cardiovascular:      Rate and Rhythm: Regular rhythm. Tachycardia present.      Heart sounds:   No systolic murmur is present.   Pulmonary:      Effort: Tachypnea present.      Breath sounds: No decreased breath sounds, wheezing, rhonchi or rales.   Chest:      Chest wall: No swelling or tenderness.   Abdominal:      General: Abdomen is flat. Bowel sounds are decreased. There is no distension.      Palpations: Abdomen is soft.      Tenderness: There is generalized abdominal tenderness.   Genitourinary:     Comments: No pena in place  Lymphadenopathy:      Comments: No peripheral edema   Skin:     General: Skin is cool and moist.   Neurological:      General: No focal deficit present.       Mental Status: He is oriented to person, place, and time. He is lethargic.      GCS: GCS eye subscore is 4. GCS verbal subscore is 5. GCS motor subscore is 6.   Psychiatric:         Attention and Perception: He is inattentive.         Mood and Affect: Mood is depressed. Affect is labile, blunt, flat and angry.         Speech: Speech is delayed, slurred and tangential.         Behavior: Behavior is uncooperative, slowed and withdrawn.           Significant Labs: All pertinent labs within the past 24 hours have been reviewed.  Recent Results (from the past 24 hour(s))   POCT glucose    Collection Time: 09/16/22 10:44 PM   Result Value Ref Range    POCT Glucose >500 (H) 70 - 110 mg/dL   CBC auto differential    Collection Time: 09/16/22 11:35 PM   Result Value Ref Range    WBC 11.04 3.90 - 12.70 K/uL    RBC 5.45 4.60 - 6.20 M/uL    Hemoglobin 15.0 14.0 - 18.0 g/dL    Hematocrit 46.8 40.0 - 54.0 %    MCV 86 82 - 98 fL    MCH 27.5 27.0 - 31.0 pg    MCHC 32.1 32.0 - 36.0 g/dL    RDW 14.1 11.5 - 14.5 %    Platelets 260 150 - 450 K/uL    MPV 10.1 9.2 - 12.9 fL    Immature Granulocytes 0.5 0.0 - 0.5 %    Gran # (ANC) 8.5 (H) 1.8 - 7.7 K/uL    Immature Grans (Abs) 0.06 (H) 0.00 - 0.04 K/uL    Lymph # 1.5 1.0 - 4.8 K/uL    Mono # 0.9 0.3 - 1.0 K/uL    Eos # 0.0 0.0 - 0.5 K/uL    Baso # 0.02 0.00 - 0.20 K/uL    nRBC 0 0 /100 WBC    Gran % 77.0 (H) 38.0 - 73.0 %    Lymph % 13.4 (L) 18.0 - 48.0 %    Mono % 8.5 4.0 - 15.0 %    Eosinophil % 0.4 0.0 - 8.0 %    Basophil % 0.2 0.0 - 1.9 %    Differential Method Automated    Comprehensive metabolic panel    Collection Time: 09/16/22 11:35 PM   Result Value Ref Range    Sodium 131 (L) 136 - 145 mmol/L    Potassium 5.7 (H) 3.5 - 5.1 mmol/L    Chloride 94 (L) 95 - 110 mmol/L    CO2 5 (LL) 23 - 29 mmol/L    Glucose 736 (HH) 70 - 110 mg/dL    BUN 27 (H) 6 - 20 mg/dL    Creatinine 1.9 (H) 0.5 - 1.4 mg/dL    Calcium 10.1 8.7 - 10.5 mg/dL    Total Protein 9.4 (H) 6.0 - 8.4 g/dL    Albumin  4.5 3.5 - 5.2 g/dL    Total Bilirubin 0.6 0.1 - 1.0 mg/dL    Alkaline Phosphatase 132 55 - 135 U/L    AST 20 10 - 40 U/L    ALT 21 10 - 44 U/L    Anion Gap 32 (H) 8 - 16 mmol/L    eGFR 41 (A) >60 mL/min/1.73 m^2   Ethanol    Collection Time: 09/16/22 11:35 PM   Result Value Ref Range    Alcohol, Serum <10 <10 mg/dL   Beta - Hydroxybutyrate, Serum    Collection Time: 09/16/22 11:35 PM   Result Value Ref Range    Beta-Hydroxybutyrate 6.3 (H) 0.0 - 0.5 mmol/L   ISTAT PROCEDURE    Collection Time: 09/17/22 12:41 AM   Result Value Ref Range    POC PH 7.059 (L) 7.35 - 7.45    POC PCO2 40.7 35 - 45 mmHg    POC PO2 26 (L) 40 - 60 mmHg    POC HCO3 11.5 (L) 24 - 28 mmol/L    POC BE -18 -2 to 2 mmol/L    POC SATURATED O2 28 (L) 95 - 100 %    POC TCO2 13 (L) 24 - 29 mmol/L    Sample VENOUS     Site Liam/UAC     Allens Test N/A     DelSys Room Air    ISTAT Lactate    Collection Time: 09/17/22 12:42 AM   Result Value Ref Range    POC Lactate 3.36 (H) 0.5 - 2.2 mmol/L    Sample VENOUS     Site Liam/C     Allens Test N/A     DelSys Room Air    Lactic acid, plasma #1    Collection Time: 09/17/22 12:53 AM   Result Value Ref Range    Lactate (Lactic Acid) 2.8 (H) 0.5 - 2.2 mmol/L   Procalcitonin    Collection Time: 09/17/22 12:53 AM   Result Value Ref Range    Procalcitonin 0.71 (H) <0.25 ng/mL   Troponin I    Collection Time: 09/17/22 12:53 AM   Result Value Ref Range    Troponin I 0.006 0.000 - 0.026 ng/mL   POCT Influenza A/B Molecular    Collection Time: 09/17/22  1:19 AM   Result Value Ref Range    POC Molecular Influenza A Ag Negative Negative, Not Reported    POC Molecular Influenza B Ag Negative Negative, Not Reported     Acceptable Yes    POCT glucose    Collection Time: 09/17/22  1:43 AM   Result Value Ref Range    POCT Glucose >500 (H) 70 - 110 mg/dL   POCT COVID-19 Rapid Screening    Collection Time: 09/17/22  1:48 AM   Result Value Ref Range    POC Rapid COVID Negative Negative      Acceptable Yes    Drug screen panel, emergency    Collection Time: 09/17/22  2:45 AM   Result Value Ref Range    Benzodiazepines Negative Negative    Methadone metabolites Negative Negative    Cocaine (Metab.) Negative Negative    Opiate Scrn, Ur Negative Negative    Barbiturate Screen, Ur Negative Negative    Amphetamine Screen, Ur Negative Negative    THC Negative Negative    Phencyclidine Negative Negative    Creatinine, Urine 15.1 (L) 23.0 - 375.0 mg/dL    Toxicology Information SEE COMMENT    POCT glucose    Collection Time: 09/17/22  3:29 AM   Result Value Ref Range    POCT Glucose >500 (H) 70 - 110 mg/dL         Significant Imaging: I have reviewed all pertinent imaging results/findings within the past 24 hours.

## 2022-09-17 NOTE — ED NOTES
Straight cath performed, urine collected using sterile technique and sent to lab.    H/O abdominoplasty    History of appendectomy    S/P abdominal surgery, follow-up exam    S/P

## 2022-09-17 NOTE — EICU
"eICU Brief Note    Issue: 57 y old male with vomiting and found to be in DKA; likely from being off insulin for a week . Elevated Betahydroxybutyrate    ABG acidotic  Asleep  BP (!) 146/67   Pulse 109   Temp 97.4 °F (36.3 °C) (Oral)   Resp 18   Ht 5' 8" (1.727 m)   Wt 51.7 kg (113 lb 15.7 oz)   SpO2 100%   BMI 17.33 kg/m²   Intake/Output - Last 3 Shifts         09/15 0700  09/16 0659 09/16 0700  09/17 0659    I.V. (mL/kg)  50.9 (1)    IV Piggyback  2090.6    Total Intake(mL/kg)  2141.5 (41.4)    Net  +2141.5                K 5.7 to 5.1 last check   Plan :  Insulin infusion   Follow elevated LA at 2.7  Follow AG 32 , ABG pH 7.05  Follow severe met acidosis  RAMYA - getting hydration  High K - follow- improving  Enoxaparin for DVT prophylaxis    D/w RN    "

## 2022-09-17 NOTE — ED PROVIDER NOTES
"Encounter Date: 9/16/2022    SCRIBE #1 NOTE: I, Taylor Rose, am scribing for, and in the presence of,  Hans Barr MD. I have scribed the following portions of the note - Other sections scribed: HPI, ROS, PE, EKG.     History     Chief Complaint   Patient presents with    Hyperglycemia     Pt c/o hyperglycemia with vomiting x2 hours. Per EMS, pt was seen by PCP today for insulin refill but advised by pharmacy that Rx is unavailable until 09/19. EMS states "pt BGL noted as "HI" (> 600) and refused attempts for IV access; pt began non-productive vomiting on arrival at ED." Pt not answering questions at ED due to vomiting and yelling.     Cali Mabry is a 57 y.o. male, with a PMHx of Type 1 DM, DKA, CHF, HTN and IVDU, who presents to the ED for evaluation of nausea and vomiting, secondary to hyperglycemia onset 2 hours PTA. Per EMS, patient's CBG >600. Patient refused attempts for IV access en route. In ED, patient vomiting intermittently and yelling. EMS relay patient was seen by PCP today for insulin refill but advised by pharmacy that Rx is unavailable until 9/19. No other associated symptoms reported at this time. History limited due to uncooperative nature of patient.      The history is provided by the patient. No  was used.   Review of patient's allergies indicates:  No Known Allergies  Past Medical History:   Diagnosis Date    Acute on chronic pancreatitis 4/3/2016    CHF (congestive heart failure)     DKA (diabetic ketoacidoses) 02/2020    DM (diabetes mellitus), type 1     HCV (hepatitis C virus)     high VL     HTN (hypertension)     Hypomagnesemia 5/25/2020    IVDU (intravenous drug user)     Heroin    Pancreatitis      Past Surgical History:   Procedure Laterality Date    CARDIAC SURGERY  1999    stent placed. ([facility])    ESOPHAGOGASTRODUODENOSCOPY N/A 3/5/2020    Gastritis.  No H pylori.  Completed PPI for 1 month.  Procedure: EGD (ESOPHAGOGASTRODUODENOSCOPY); "  Surgeon: Brinda Rene MD;  Location: [facility] ENDO;  Service: Endoscopy;  Laterality: N/A;  [id number] A; [phone]    LEFT HEART CATHETERIZATION Left 2020    Procedure: Left heart cath;  Surgeon: Fito Rangel MD;  Location: [facility] CATH LAB;  Service: Cardiology;  Laterality: Left;    SHOULDER SURGERY      right shoulder, spur removal.     Family History   Problem Relation Age of Onset    Asthma Mother      Social History     Tobacco Use    Smoking status: Every Day     Packs/day: 1.00     Years: 20.00     Pack years: 20.00     Types: Cigarettes     Start date: 4/3/1981     Last attempt to quit: 2020     Years since quittin.2    Smokeless tobacco: Never    Tobacco comments:     states approx 1 per day.   Substance Use Topics    Alcohol use: Not Currently     Alcohol/week: 0.0 standard drinks    Drug use: Yes     Types: Heroin, IV, Marijuana     Review of Systems   Constitutional: Negative.    HENT: Negative.     Eyes: Negative.    Respiratory: Negative.     Cardiovascular: Negative.    Gastrointestinal:  Positive for nausea and vomiting.   Genitourinary: Negative.    Musculoskeletal: Negative.    Skin: Negative.    Neurological: Negative.    All other systems reviewed and are negative.    Physical Exam     Initial Vitals   BP Pulse Resp Temp SpO2   22 2230 22 2230 22 2230 22 0030 22 2230   (!) 140/92 109 18 98.7 °F (37.1 °C) 100 %      MAP       --                Physical Exam    Constitutional: Vital signs are normal. He appears cachectic. He is cooperative.   HENT:   Head: Normocephalic and atraumatic.   Vomitus around mouth.   Eyes: Conjunctivae, EOM and lids are normal. Pupils are equal, round, and reactive to light.   Neck: Trachea normal. Neck supple. No thyroid mass present.    Full passive range of motion without pain.     Cardiovascular:  Normal rate, regular rhythm, S1 normal, S2 normal, normal heart sounds, intact distal pulses and normal pulses.            Abdominal: Abdomen is soft. Bowel sounds are normal.   Musculoskeletal:         General: Normal range of motion.      Cervical back: Full passive range of motion without pain and neck supple.     Lymphadenopathy:     He has no axillary adenopathy.   Neurological: He is alert and oriented to person, place, and time.   Skin: Skin is warm, dry and intact.   Psychiatric: He has a normal mood and affect. His speech is normal and behavior is normal. Judgment and thought content normal. Cognition and memory are normal.       ED Course   Critical Care    Date/Time: 9/17/2022 1:18 AM  Performed by: Hans Barr MD  Authorized by: Mahamed Garcia MD   Direct patient critical care time: 14 minutes  Additional history critical care time: 13 minutes  Ordering / reviewing critical care time: 13 minutes  Documentation critical care time: 15 minutes  Consulting other physicians critical care time: 3 minutes  Total critical care time (exclusive of procedural time) : 58 minutes  Critical care time was exclusive of separately billable procedures and treating other patients and teaching time.  Critical care was time spent personally by me on the following activities: discussions with consultants, evaluation of patient's response to treatment, examination of patient, ordering and performing treatments and interventions, ordering and review of laboratory studies, ordering and review of radiographic studies, pulse oximetry, re-evaluation of patient's condition and review of old charts.      Labs Reviewed   CBC W/ AUTO DIFFERENTIAL - Abnormal; Notable for the following components:       Result Value    Gran # (ANC) 8.5 (*)     Immature Grans (Abs) 0.06 (*)     Gran % 77.0 (*)     Lymph % 13.4 (*)     All other components within normal limits   COMPREHENSIVE METABOLIC PANEL - Abnormal; Notable for the following components:    Sodium 131 (*)     Potassium 5.7 (*)     Chloride 94 (*)     CO2 5 (*)     Glucose 736 (*)     BUN 27 (*)      Creatinine 1.9 (*)     Total Protein 9.4 (*)     Anion Gap 32 (*)     eGFR 41 (*)     All other components within normal limits    Narrative:     Glucose and CO2 critical result(s) called and verbal readback   obtained from Serina Guallpa RN ED  by FRANNY 09/17/2022 00:18   BETA - HYDROXYBUTYRATE, SERUM - Abnormal; Notable for the following components:    Beta-Hydroxybutyrate 6.3 (*)     All other components within normal limits   POCT GLUCOSE - Abnormal; Notable for the following components:    POCT Glucose >500 (*)     All other components within normal limits   ISTAT PROCEDURE - Abnormal; Notable for the following components:    POC PH 7.059 (*)     POC PO2 26 (*)     POC HCO3 11.5 (*)     POC SATURATED O2 28 (*)     POC TCO2 13 (*)     All other components within normal limits   ISTAT LACTATE - Abnormal; Notable for the following components:    POC Lactate 3.36 (*)     All other components within normal limits   CULTURE, BLOOD   CULTURE, BLOOD   CULTURE, BLOOD   CULTURE, BLOOD   ALCOHOL,MEDICAL (ETHANOL)   URINALYSIS   DRUG SCREEN PANEL, URINE EMERGENCY   LACTIC ACID, PLASMA   PROCALCITONIN   TROPONIN I   LACTIC ACID, PLASMA   POCT INFLUENZA A/B MOLECULAR     EKG Readings: (Independently Interpreted)   Initial Reading: No STEMI. Rhythm: Sinus Tachycardia. Heart Rate: 111. Ectopy: PVCs. Clinical Impression: Sinus Tachycardia     Imaging Results              X-Ray Chest AP Portable (Final result)  Result time 09/17/22 00:27:12      Final result by Chapito Walker MD (09/17/22 00:27:12)                   Impression:      No acute abnormality.      Electronically signed by: Chapito Walker  Date:    09/17/2022  Time:    00:27               Narrative:    EXAMINATION:  XR CHEST AP PORTABLE    CLINICAL HISTORY:  Sepsis;    TECHNIQUE:  Single frontal view of the chest was performed.    COMPARISON:  Of 03/07/2022    FINDINGS:  The lungs are clear, with normal appearance of pulmonary vasculature and no pleural  effusion or pneumothorax.  Mild COPD changes with scarring in the right costophrenic sulcus appears stable.    The cardiac silhouette is normal in size. The hilar and mediastinal contours are unremarkable.    Bones are intact.                                       Medications   sodium chloride 0.9% bolus 1,000 mL (1,000 mLs Intravenous Bolus from Bag 9/17/22 0111)   piperacillin-tazobactam 4.5 g in dextrose 5 % 100 mL IVPB (ready to mix system) (has no administration in time range)   ondansetron injection 4 mg (4 mg Intravenous Given 9/16/22 2339)   sodium chloride 0.9% bolus 1,000 mL (1,000 mLs Intravenous Bolus from Bag 9/16/22 2340)   insulin regular injection 5 Units 0.05 mL (5 Units Intravenous Given 9/16/22 2344)     Medical Decision Making:   History:   Old Medical Records: I decided to obtain old medical records.        Scribe Attestation:   Scribe #1: I performed the above scribed service and the documentation accurately describes the services I performed. I attest to the accuracy of the note.            Results for orders placed or performed during the hospital encounter of 09/16/22   CBC auto differential   Result Value Ref Range    WBC 11.04 3.90 - 12.70 K/uL    RBC 5.45 4.60 - 6.20 M/uL    Hemoglobin 15.0 14.0 - 18.0 g/dL    Hematocrit 46.8 40.0 - 54.0 %    MCV 86 82 - 98 fL    MCH 27.5 27.0 - 31.0 pg    MCHC 32.1 32.0 - 36.0 g/dL    RDW 14.1 11.5 - 14.5 %    Platelets 260 150 - 450 K/uL    MPV 10.1 9.2 - 12.9 fL    Immature Granulocytes 0.5 0.0 - 0.5 %    Gran # (ANC) 8.5 (H) 1.8 - 7.7 K/uL    Immature Grans (Abs) 0.06 (H) 0.00 - 0.04 K/uL    Lymph # 1.5 1.0 - 4.8 K/uL    Mono # 0.9 0.3 - 1.0 K/uL    Eos # 0.0 0.0 - 0.5 K/uL    Baso # 0.02 0.00 - 0.20 K/uL    nRBC 0 0 /100 WBC    Gran % 77.0 (H) 38.0 - 73.0 %    Lymph % 13.4 (L) 18.0 - 48.0 %    Mono % 8.5 4.0 - 15.0 %    Eosinophil % 0.4 0.0 - 8.0 %    Basophil % 0.2 0.0 - 1.9 %    Differential Method Automated    Comprehensive metabolic panel    Result Value Ref Range    Sodium 131 (L) 136 - 145 mmol/L    Potassium 5.7 (H) 3.5 - 5.1 mmol/L    Chloride 94 (L) 95 - 110 mmol/L    CO2 5 (LL) 23 - 29 mmol/L    Glucose 736 (HH) 70 - 110 mg/dL    BUN 27 (H) 6 - 20 mg/dL    Creatinine 1.9 (H) 0.5 - 1.4 mg/dL    Calcium 10.1 8.7 - 10.5 mg/dL    Total Protein 9.4 (H) 6.0 - 8.4 g/dL    Albumin 4.5 3.5 - 5.2 g/dL    Total Bilirubin 0.6 0.1 - 1.0 mg/dL    Alkaline Phosphatase 132 55 - 135 U/L    AST 20 10 - 40 U/L    ALT 21 10 - 44 U/L    Anion Gap 32 (H) 8 - 16 mmol/L    eGFR 41 (A) >60 mL/min/1.73 m^2   Ethanol   Result Value Ref Range    Alcohol, Serum <10 <10 mg/dL   Beta - Hydroxybutyrate, Serum   Result Value Ref Range    Beta-Hydroxybutyrate 6.3 (H) 0.0 - 0.5 mmol/L   POCT glucose   Result Value Ref Range    POCT Glucose >500 (H) 70 - 110 mg/dL   ISTAT PROCEDURE   Result Value Ref Range    POC PH 7.059 (L) 7.35 - 7.45    POC PCO2 40.7 35 - 45 mmHg    POC PO2 26 (L) 40 - 60 mmHg    POC HCO3 11.5 (L) 24 - 28 mmol/L    POC BE -18 -2 to 2 mmol/L    POC SATURATED O2 28 (L) 95 - 100 %    POC TCO2 13 (L) 24 - 29 mmol/L    Sample VENOUS     Site [facility]     Allens Test N/A     [facility] Room Air    ISTAT Lactate   Result Value Ref Range    POC Lactate 3.36 (H) 0.5 - 2.2 mmol/L    Sample VENOUS     Site Liam/[facility]     Allens Test N/A     [facility]Sys Room Air       Imaging Results              X-Ray Chest AP Portable (Final result)  Result time 09/17/22 00:27:12      Final result by Chapito Walker MD (09/17/22 00:27:12)                   Impression:      No acute abnormality.      Electronically signed by: Chapito Walker  Date:    09/17/2022  Time:    00:27               Narrative:    EXAMINATION:  XR CHEST AP PORTABLE    CLINICAL HISTORY:  Sepsis;    TECHNIQUE:  Single frontal view of the chest was performed.    COMPARISON:  Of 03/07/2022    FINDINGS:  The lungs are clear, with normal appearance of pulmonary vasculature and no pleural effusion or pneumothorax.  Mild COPD  changes with scarring in the right costophrenic sulcus appears stable.    The cardiac silhouette is normal in size. The hilar and mediastinal contours are unremarkable.    Bones are intact.                                          Clinical Impression:   Final diagnoses:  [E11.10] DKA (diabetic ketoacidosis)  [R73.9] Hyperglycemia (Primary)  [E10.10] Diabetic ketoacidosis without coma associated with type 1 diabetes mellitus      ED Disposition Condition    Admit Stable        I, Hans Barr MD , personally performed the services described in this documentation. All medical record entries made by the scribe were at my direction and in my presence. I have reviewed the chart and agree that the record reflects my personal performance and is accurate and complete.         Hans Barr MD  09/17/22 0119

## 2022-09-17 NOTE — PLAN OF CARE
Problem: Adult Inpatient Plan of Care  Goal: Plan of Care Review  Outcome: Ongoing, Progressing    Patient was admitted this morning from ER. He came in to the hospital after experiencing vomiting at home and not feeling well. His blood sugar was high but refused IV access by EMS. In ER he was found to have Glucose level of 739, CO2 of 5 anion Gap of 32 and ph of 7.0 Diagnosed with DKA and insulin drip was started. Upon arrival to ICU he is soak with urine. Patient was cleaned and changed. He is responsive to verbal stimuli but goes back to sleep immediately. Slightly irritable when ask and verbalizes to leave him alone. Able to answer some questions for admit assessment. Blood Glucose monitored hourly Condom Cath place. Will Monitor patient closely.

## 2022-09-17 NOTE — ASSESSMENT & PLAN NOTE
  albuterol-ipratropium 2.5 mg-0.5 mg/3 mL nebulizer solution 3 mL, 3 mL, Nebulization, Q4H PRN     albuterol-ipratropium 2.5 mg-0.5 mg/3 mL nebulizer solution 3 mL, 3 mL, Nebulization, Q8H

## 2022-09-18 PROBLEM — Z01.89 ENCOUNTER FOR ASSESSMENT OF DECISION-MAKING CAPACITY: Status: ACTIVE | Noted: 2022-09-18

## 2022-09-18 PROBLEM — Z00.8 ENCOUNTER FOR ASSESSMENT OF DECISION-MAKING CAPACITY: Status: ACTIVE | Noted: 2022-09-18

## 2022-09-18 LAB
ANION GAP SERPL CALC-SCNC: 10 MMOL/L (ref 8–16)
ANION GAP SERPL CALC-SCNC: 8 MMOL/L (ref 8–16)
B-OH-BUTYR BLD STRIP-SCNC: 0.6 MMOL/L (ref 0–0.5)
BASOPHILS # BLD AUTO: 0.01 K/UL (ref 0–0.2)
BASOPHILS NFR BLD: 0.2 % (ref 0–1.9)
BUN SERPL-MCNC: 18 MG/DL (ref 6–20)
BUN SERPL-MCNC: 20 MG/DL (ref 6–20)
CALCIUM SERPL-MCNC: 8 MG/DL (ref 8.7–10.5)
CALCIUM SERPL-MCNC: 8.3 MG/DL (ref 8.7–10.5)
CHLORIDE SERPL-SCNC: 106 MMOL/L (ref 95–110)
CHLORIDE SERPL-SCNC: 108 MMOL/L (ref 95–110)
CO2 SERPL-SCNC: 17 MMOL/L (ref 23–29)
CO2 SERPL-SCNC: 22 MMOL/L (ref 23–29)
CREAT SERPL-MCNC: 1.2 MG/DL (ref 0.5–1.4)
CREAT SERPL-MCNC: 1.2 MG/DL (ref 0.5–1.4)
DIFFERENTIAL METHOD: ABNORMAL
EOSINOPHIL # BLD AUTO: 0.1 K/UL (ref 0–0.5)
EOSINOPHIL NFR BLD: 1.6 % (ref 0–8)
ERYTHROCYTE [DISTWIDTH] IN BLOOD BY AUTOMATED COUNT: 14.7 % (ref 11.5–14.5)
EST. GFR  (NO RACE VARIABLE): >60 ML/MIN/1.73 M^2
EST. GFR  (NO RACE VARIABLE): >60 ML/MIN/1.73 M^2
GLUCOSE SERPL-MCNC: 208 MG/DL (ref 70–110)
GLUCOSE SERPL-MCNC: 295 MG/DL (ref 70–110)
HCT VFR BLD AUTO: 36.4 % (ref 40–54)
HGB BLD-MCNC: 11.9 G/DL (ref 14–18)
IMM GRANULOCYTES # BLD AUTO: 0.03 K/UL (ref 0–0.04)
IMM GRANULOCYTES NFR BLD AUTO: 0.5 % (ref 0–0.5)
LYMPHOCYTES # BLD AUTO: 1 K/UL (ref 1–4.8)
LYMPHOCYTES NFR BLD: 16.6 % (ref 18–48)
MAGNESIUM SERPL-MCNC: 1.8 MG/DL (ref 1.6–2.6)
MAGNESIUM SERPL-MCNC: 2 MG/DL (ref 1.6–2.6)
MCH RBC QN AUTO: 26.4 PG (ref 27–31)
MCHC RBC AUTO-ENTMCNC: 32.7 G/DL (ref 32–36)
MCV RBC AUTO: 81 FL (ref 82–98)
MONOCYTES # BLD AUTO: 0.9 K/UL (ref 0.3–1)
MONOCYTES NFR BLD: 13.8 % (ref 4–15)
NEUTROPHILS # BLD AUTO: 4.2 K/UL (ref 1.8–7.7)
NEUTROPHILS NFR BLD: 67.3 % (ref 38–73)
NRBC BLD-RTO: 0 /100 WBC
PHOSPHATE SERPL-MCNC: 1.8 MG/DL (ref 2.7–4.5)
PLATELET # BLD AUTO: 256 K/UL (ref 150–450)
PMV BLD AUTO: 9.1 FL (ref 9.2–12.9)
POCT GLUCOSE: 176 MG/DL (ref 70–110)
POCT GLUCOSE: 207 MG/DL (ref 70–110)
POCT GLUCOSE: 213 MG/DL (ref 70–110)
POCT GLUCOSE: 219 MG/DL (ref 70–110)
POCT GLUCOSE: 242 MG/DL (ref 70–110)
POCT GLUCOSE: 248 MG/DL (ref 70–110)
POCT GLUCOSE: 253 MG/DL (ref 70–110)
POCT GLUCOSE: 262 MG/DL (ref 70–110)
POCT GLUCOSE: 56 MG/DL (ref 70–110)
POCT GLUCOSE: >500 MG/DL (ref 70–110)
POCT GLUCOSE: >500 MG/DL (ref 70–110)
POTASSIUM SERPL-SCNC: 3.3 MMOL/L (ref 3.5–5.1)
POTASSIUM SERPL-SCNC: 3.6 MMOL/L (ref 3.5–5.1)
RBC # BLD AUTO: 4.5 M/UL (ref 4.6–6.2)
SODIUM SERPL-SCNC: 135 MMOL/L (ref 136–145)
SODIUM SERPL-SCNC: 136 MMOL/L (ref 136–145)
WBC # BLD AUTO: 6.25 K/UL (ref 3.9–12.7)

## 2022-09-18 PROCEDURE — 84100 ASSAY OF PHOSPHORUS: CPT | Performed by: INTERNAL MEDICINE

## 2022-09-18 PROCEDURE — 63600175 PHARM REV CODE 636 W HCPCS: Performed by: INTERNAL MEDICINE

## 2022-09-18 PROCEDURE — 25000003 PHARM REV CODE 250: Performed by: STUDENT IN AN ORGANIZED HEALTH CARE EDUCATION/TRAINING PROGRAM

## 2022-09-18 PROCEDURE — 94640 AIRWAY INHALATION TREATMENT: CPT

## 2022-09-18 PROCEDURE — 25000003 PHARM REV CODE 250: Performed by: INTERNAL MEDICINE

## 2022-09-18 PROCEDURE — 83735 ASSAY OF MAGNESIUM: CPT | Performed by: INTERNAL MEDICINE

## 2022-09-18 PROCEDURE — 99900035 HC TECH TIME PER 15 MIN (STAT)

## 2022-09-18 PROCEDURE — 85025 COMPLETE CBC W/AUTO DIFF WBC: CPT | Performed by: INTERNAL MEDICINE

## 2022-09-18 PROCEDURE — 25000242 PHARM REV CODE 250 ALT 637 W/ HCPCS: Performed by: INTERNAL MEDICINE

## 2022-09-18 PROCEDURE — 82010 KETONE BODYS QUAN: CPT | Performed by: STUDENT IN AN ORGANIZED HEALTH CARE EDUCATION/TRAINING PROGRAM

## 2022-09-18 PROCEDURE — 36415 COLL VENOUS BLD VENIPUNCTURE: CPT | Performed by: STUDENT IN AN ORGANIZED HEALTH CARE EDUCATION/TRAINING PROGRAM

## 2022-09-18 PROCEDURE — 80048 BASIC METABOLIC PNL TOTAL CA: CPT | Performed by: STUDENT IN AN ORGANIZED HEALTH CARE EDUCATION/TRAINING PROGRAM

## 2022-09-18 PROCEDURE — 11000001 HC ACUTE MED/SURG PRIVATE ROOM

## 2022-09-18 RX ORDER — INSULIN ASPART 100 [IU]/ML
5 INJECTION, SOLUTION INTRAVENOUS; SUBCUTANEOUS
Status: DISCONTINUED | OUTPATIENT
Start: 2022-09-18 | End: 2022-09-20 | Stop reason: HOSPADM

## 2022-09-18 RX ORDER — IBUPROFEN 200 MG
16 TABLET ORAL
Status: DISCONTINUED | OUTPATIENT
Start: 2022-09-18 | End: 2022-09-20 | Stop reason: HOSPADM

## 2022-09-18 RX ORDER — GLUCAGON 1 MG
1 KIT INJECTION
Status: DISCONTINUED | OUTPATIENT
Start: 2022-09-18 | End: 2022-09-20 | Stop reason: HOSPADM

## 2022-09-18 RX ORDER — POTASSIUM CHLORIDE 7.45 MG/ML
10 INJECTION INTRAVENOUS
Status: COMPLETED | OUTPATIENT
Start: 2022-09-18 | End: 2022-09-18

## 2022-09-18 RX ORDER — HALOPERIDOL 5 MG/ML
2 INJECTION INTRAMUSCULAR EVERY 6 HOURS PRN
Status: DISCONTINUED | OUTPATIENT
Start: 2022-09-18 | End: 2022-09-20

## 2022-09-18 RX ORDER — IBUPROFEN 200 MG
24 TABLET ORAL
Status: DISCONTINUED | OUTPATIENT
Start: 2022-09-18 | End: 2022-09-20 | Stop reason: HOSPADM

## 2022-09-18 RX ORDER — INSULIN ASPART 100 [IU]/ML
1-10 INJECTION, SOLUTION INTRAVENOUS; SUBCUTANEOUS
Status: DISCONTINUED | OUTPATIENT
Start: 2022-09-18 | End: 2022-09-20 | Stop reason: HOSPADM

## 2022-09-18 RX ADMIN — QUETIAPINE 50 MG: 25 TABLET ORAL at 09:09

## 2022-09-18 RX ADMIN — POTASSIUM CHLORIDE 10 MEQ: 7.46 INJECTION, SOLUTION INTRAVENOUS at 01:09

## 2022-09-18 RX ADMIN — IPRATROPIUM BROMIDE AND ALBUTEROL SULFATE 3 ML: 2.5; .5 SOLUTION RESPIRATORY (INHALATION) at 12:09

## 2022-09-18 RX ADMIN — INSULIN DETEMIR 20 UNITS: 100 INJECTION, SOLUTION SUBCUTANEOUS at 05:09

## 2022-09-18 RX ADMIN — IPRATROPIUM BROMIDE AND ALBUTEROL SULFATE 3 ML: 2.5; .5 SOLUTION RESPIRATORY (INHALATION) at 07:09

## 2022-09-18 RX ADMIN — INSULIN ASPART 5 UNITS: 100 INJECTION, SOLUTION INTRAVENOUS; SUBCUTANEOUS at 07:09

## 2022-09-18 RX ADMIN — GABAPENTIN 300 MG: 300 CAPSULE ORAL at 09:09

## 2022-09-18 RX ADMIN — INSULIN ASPART 6 UNITS: 100 INJECTION, SOLUTION INTRAVENOUS; SUBCUTANEOUS at 07:09

## 2022-09-18 RX ADMIN — POTASSIUM CHLORIDE 10 MEQ: 7.46 INJECTION, SOLUTION INTRAVENOUS at 03:09

## 2022-09-18 RX ADMIN — HALOPERIDOL LACTATE 2 MG: 5 INJECTION, SOLUTION INTRAMUSCULAR at 09:09

## 2022-09-18 NOTE — NURSING
[facility]  Nurses Note -- 4 Eyes      9/18/2022       Skin assessed on: Q Shift      [x] No Pressure Injuries Present    []Prevention Measures Documented    [] Yes LDA  for Pressure Injury Previously documented     [] Yes New Pressure Injury Discovered   [] LDA for New Pressure Injury Added      Attending RN:  Crys Lancaster RN     Second RN:  Niharika

## 2022-09-18 NOTE — ASSESSMENT & PLAN NOTE
Suspected DKA due to insulin non-compliance. Gap resolved.     Most recent fingerstick glucose reviewed-   Recent Labs   Lab 09/18/22  0436 09/18/22  0520 09/18/22  0728 09/18/22  1112   POCTGLUCOSE 176* 213* 253* 262*     Current correctional scale  High  Increase anti-hyperglycemic dose as follows-   Antihyperglycemics (From admission, onward)    Start     Stop Route Frequency Ordered    09/18/22 0715  insulin aspart U-100 pen 5 Units         -- SubQ 3 times daily with meals 09/18/22 0535    09/18/22 0635  insulin aspart U-100 pen 1-10 Units         -- SubQ Before meals & nightly PRN 09/18/22 0535    09/18/22 0545  insulin detemir U-100 pen 20 Units         -- SubQ Daily 09/18/22 0535

## 2022-09-18 NOTE — SUBJECTIVE & OBJECTIVE
Interval History: Pt sedated this AM    Review of Systems   Unable to perform ROS: Mental status change   Objective:     Vital Signs (Most Recent):  Temp: 97.3 °F (36.3 °C) (09/18/22 1101)  Pulse: 92 (09/18/22 1130)  Resp: 17 (09/18/22 1130)  BP: (!) 117/58 (09/18/22 1101)  SpO2: 100 % (09/18/22 1130)   Vital Signs (24h Range):  Temp:  [97.3 °F (36.3 °C)-98.8 °F (37.1 °C)] 97.3 °F (36.3 °C)  Pulse:  [79-96] 92  Resp:  [13-40] 17  SpO2:  [96 %-100 %] 100 %  BP: ()/(53-77) 117/58     Weight: 51.7 kg (113 lb 15.7 oz)  Body mass index is 17.33 kg/m².    Intake/Output Summary (Last 24 hours) at 9/18/2022 1312  Last data filed at 9/18/2022 0600  Gross per 24 hour   Intake 2382.91 ml   Output 575 ml   Net 1807.91 ml      Physical Exam  Constitutional:       General: He is not in acute distress.     Appearance: He is ill-appearing. He is not toxic-appearing or diaphoretic.   Cardiovascular:      Rate and Rhythm: Normal rate and regular rhythm.      Heart sounds: No murmur heard.    No gallop.   Pulmonary:      Effort: Pulmonary effort is normal. No respiratory distress.      Breath sounds: Normal breath sounds. No wheezing.   Abdominal:      General: Bowel sounds are normal. There is no distension.      Palpations: Abdomen is soft.      Tenderness: There is no abdominal tenderness.       Significant Labs: All pertinent labs within the past 24 hours have been reviewed.    Significant Imaging: I have reviewed all pertinent imaging results/findings within the past 24 hours.

## 2022-09-18 NOTE — CARE UPDATE
Pt more awake this PM. Discussed his current medical situation with him. He knows he came in for DKA, and knows that possible consequences of untreated DKA include coma and death. Is able to report details of his home insulin therapy.     The patient's decision making capacity was assessed using the following criteria (Chuck 2007):    1) Communicate a choice    2) Understand the relevant information    3) Appreciate the situation and its consequences    4) Reason about treatment choices     He satisfies these criteria, this is able to make his own decisions. He shows no signs of hallucinations, delusions, or other psychotic behavior. He likely has an underlying personality disorder, but this is not reason enough to retain him against his will.     Thus, PEC order is rescinded. Pt to step down to the floor today.

## 2022-09-18 NOTE — NURSING
"2038 pt shouting and yelling, reporting that he wants all IV's, and bp off arm. Reported tp patient that all therapeutic tools need to be on to keep him safe. Pt cont to yell and say " I dont want to be here, I want to leave". Cont to consol patient, he started to become combative, and take out IV"S. Cont to say he wants to leave. Stopped IV infusion and IV fluids see mar, took off bp cuff at pts request. Allowed pt to sign AMA paper.    2040 Security is at bedside, pt cont to yell and scream at security. Assisted pt to putting on clothes, tolerated well. Removed external cath. Pt is AOA x3, IV's remain in place, will cont to wait on provider to confirm AMA.     2106 pt refused lab draw, pt is now sitting at bedside chair. IV's remain in place, no further needs at this time.     "

## 2022-09-18 NOTE — PROVIDER TRANSFER
57y M admitted for DKA. No inciting infection found. Gap resolved w/ standard therapy.     Pt was PEC'd by Dr. Mendieta for agitation and inability to rationalize medical decisions. By the time of my evaluation he was calm, rational, and was able to make medical decisions, and so PEC was rescinded.     Likely dc tomorrow if he remains in house.

## 2022-09-18 NOTE — ASSESSMENT & PLAN NOTE
Pt agitated overnight. Assessed by Dr. Mendieta who felt he did not have capacity to make medical decisions. Has since had numerous sedating medications due to agitation, unable to re-assess currently.   - cont PEC  - reassess as medications wear off  - may need psych assessment

## 2022-09-18 NOTE — PLAN OF CARE
Pt has been very combative, restless,and agitated. PEC ordered, now on 1:1 observation. Pt rec 1 dose of 2mg ativan, and haladol IVP see mar. Pt is now in four point restraints, mittens were applied due cont to pull on IV lines. Pt remains on insulin infusion at current rate, and on D5 1/2 NS @ 125 ml/hr see mar. Pt is now resting in bed, awakes when stimulated, agitated and restless when awake. Falls back to sleep quickly. Sitter at bedside. External cath remains in place, connected to suction. Safety measures in place, will cont to monitor pt closely.

## 2022-09-18 NOTE — NURSING
Report received and care assumed.See flow sheet for complete assessment .No significant change from daily assessment. Awake,alert and oriented x 3

## 2022-09-18 NOTE — PLAN OF CARE
09/18/22 1152   Discharge Assessment   Assessment Type Discharge Planning Assessment   Source of Information health record   Reason For Admission DKA   Lives With parent(s)   Do you expect to return to your current living situation? Yes   Do you have help at home or someone to help you manage your care at home? Yes   Who are your caregiver(s) and their phone number(s)? Marilou (mother) [phone]   Prior to hospitilization cognitive status: Alert/Oriented   Current cognitive status: Alert/Oriented   Walking or Climbing Stairs Difficulty none   Dressing/Bathing Difficulty none   Home Accessibility wheelchair accessible   Equipment Currently Used at Home glucometer   Readmission within 30 days? No   Patient currently being followed by outpatient case management? No   Do you currently have service(s) that help you manage your care at home? No   Do you take prescription medications? Yes   Do you have prescription coverage? Yes   Do you have any problems affording any of your prescribed medications? No   Is the patient taking medications as prescribed? yes   How do you get to doctors appointments? car, drives self   Are you on dialysis? No   Do you take coumadin? No   Discharge Plan A Home with family  (with instructions to follow up)     [facility]/pharmacy #[id number] - [place] - [address] [place] [address]  Phone: [phone] Fax: [phone]    [facility] [address] [address]  Phone: [phone] Fax: [phone]

## 2022-09-18 NOTE — CONSULTS
Consult received for carbohydrate controlled diet education. This RD covering remotely for weekend coverage. Inpatient RD to follow up with education. Diet handouts attached to d/c paperwork.    Rossi Poole RD,LDN

## 2022-09-18 NOTE — NURSING
"Pt had CBG of 56. He was given 2 OJ and I attempted to give him glucose tabs which he refused. I went to recheck blood glucose but pt states " It's fine". Pt requesting to have PIV removed. I explained to pt that we need to leave PIV in so that we can give him meds in case of emergency.  "

## 2022-09-18 NOTE — NURSING
Pt transported to med-surg with this nurse at bedside. Report given to Bobbi SALINAS. Pt transferred without any difficulty.

## 2022-09-18 NOTE — PROGRESS NOTES
"[facility] Intensive Care  [facility] Medicine  Progress Note    Patient Name: Cali Mabry  MRN: [id number]  Patient Class: IP- Inpatient   Admission Date: 9/16/2022  Length of Stay: 1 days  Attending Physician: Darren Gorman MD  Primary Care Provider: Primary Doctor No        Subjective:     Principal Problem:Diabetic ketoacidosis without coma associated with type 1 diabetes mellitus        HPI:  Mr. Mabry is a 58yo man with a past medical history of HTN, IV heroin abuse, DM1 with DKA, Hep C, pancreatitis, and COPD due to long term cigarette smoking.  He is known to me from admitting him on 8/7/22 for COPD exacerbation to .    I examined and interviewed him in the ED.  He is very lethargic, and will only answer simple questions, sometimes refusing altogether.  He states, "I feel like death."  When asked how exactly, he responds, "I don't know, just not good."  I was able to gather that he hurts all over, had some N/V for several days and vague abdominal discomfort.  Of note, he stopped taking all of his insulin over a week ago (he would not tell me why).  I did ask if he was still using IV heroin, but he said, "not for a year now."    In the ED his VSs were BP (!) 185/88   Pulse (!) 111   Temp 98.7 °F (37.1 °C) (Oral)   Resp (!) 22   Ht 5' 8" (1.727 m)   Wt 65.8 kg (145 lb)   SpO2 100%   BMI 22.05 kg/m².  His labs showed a normal CBC, Na 131, K 5.7, AG 32, BUN 27, Cr 1.9 (baseline ) glucose 736, and normal LFT.  Trop 0.006, LA 2.8, BHB 6.3.  COVID/FLU NEG, ETOH NEG, VBG pH 7.059, PCO2 40.    CXR showed no acute abnormalities.  EKG showed sinus tachycardia with occasional Premature ventricular complexes, rate 111, right atrial enlargement, and possible Anterior infarct ,age undetermined.    In the ED he was treated with Insulin %U IV 2344, Zofran 4mg iv 2339, Zosyn 4.5g iv 0201, NS 1L bolus 0111 & 2340.                     Overview/Hospital Course:  Mr. Mabry was admitted to ICU with DKA. " Started on DKA pathway. Pt became agitated overnight and was PEC'd by Dr. Mendieta. Gap closed        Interval History: Pt sedated this AM    Review of Systems   Unable to perform ROS: Mental status change   Objective:     Vital Signs (Most Recent):  Temp: 97.3 °F (36.3 °C) (09/18/22 1101)  Pulse: 92 (09/18/22 1130)  Resp: 17 (09/18/22 1130)  BP: (!) 117/58 (09/18/22 1101)  SpO2: 100 % (09/18/22 1130)   Vital Signs (24h Range):  Temp:  [97.3 °F (36.3 °C)-98.8 °F (37.1 °C)] 97.3 °F (36.3 °C)  Pulse:  [79-96] 92  Resp:  [13-40] 17  SpO2:  [96 %-100 %] 100 %  BP: ()/(53-77) 117/58     Weight: 51.7 kg (113 lb 15.7 oz)  Body mass index is 17.33 kg/m².    Intake/Output Summary (Last 24 hours) at 9/18/2022 1312  Last data filed at 9/18/2022 0600  Gross per 24 hour   Intake 2382.91 ml   Output 575 ml   Net 1807.91 ml      Physical Exam  Constitutional:       General: He is not in acute distress.     Appearance: He is ill-appearing. He is not toxic-appearing or diaphoretic.   Cardiovascular:      Rate and Rhythm: Normal rate and regular rhythm.      Heart sounds: No murmur heard.    No gallop.   Pulmonary:      Effort: Pulmonary effort is normal. No respiratory distress.      Breath sounds: Normal breath sounds. No wheezing.   Abdominal:      General: Bowel sounds are normal. There is no distension.      Palpations: Abdomen is soft.      Tenderness: There is no abdominal tenderness.       Significant Labs: All pertinent labs within the past 24 hours have been reviewed.    Significant Imaging: I have reviewed all pertinent imaging results/findings within the past 24 hours.      Assessment/Plan:      * Diabetic ketoacidosis without coma associated with type 1 diabetes mellitus  Suspected DKA due to insulin non-compliance. Gap resolved.     Most recent fingerstick glucose reviewed-   Recent Labs   Lab 09/18/22  0436 09/18/22  0520 09/18/22  0728 09/18/22  1112   POCTGLUCOSE 176* 213* 253* 262*     Current correctional  scale  High  Increase anti-hyperglycemic dose as follows-   Antihyperglycemics (From admission, onward)    Start     Stop Route Frequency Ordered    09/18/22 0715  insulin aspart U-100 pen 5 Units         -- SubQ 3 times daily with meals 09/18/22 0535    09/18/22 0635  insulin aspart U-100 pen 1-10 Units         -- SubQ Before meals & nightly PRN 09/18/22 0535    09/18/22 0545  insulin detemir U-100 pen 20 Units         -- SubQ Daily 09/18/22 0535             Encounter for assessment of decision-making capacity  Pt agitated overnight. Assessed by Dr. Mendieta who felt he did not have capacity to make medical decisions. Has since had numerous sedating medications due to agitation, unable to re-assess currently.   - cont PEC  - reassess as medications wear off  - may need psych assessment      Noncompliance with medication regimen  He has not taken any insulin in over a week      RAMYA (acute kidney injury)  Improved. Baseline 1      COPD (chronic obstructive pulmonary disease)    albuterol-ipratropium 2.5 mg-0.5 mg/3 mL nebulizer solution 3 mL, 3 mL, Nebulization, Q4H PRN     albuterol-ipratropium 2.5 mg-0.5 mg/3 mL nebulizer solution 3 mL, 3 mL, Nebulization, Q8H     History of heroin abuse  He states that he last used over a year ago      Hyperkalemia  Resolved      Tobacco use disorder, moderate, dependence  Not counseled on cessation, as too lethargic and uncooperative      Chronic relapsing pancreatitis  Lipase negative. No evidence of pancreatitis currently      Chronic hepatitis C  Follow up with Hepatology  H.O IVDA with heroin       Essential hypertension  Hold norvasc for borderline pressures      VTE Risk Mitigation (From admission, onward)         Ordered     enoxaparin injection 40 mg  Daily         09/17/22 0216     Place sequential compression device  Until discontinued         09/17/22 0216     Place DAWIT hose  Until discontinued         09/17/22 0216     IP VTE HIGH RISK PATIENT  Once         09/17/22  0216                Discharge Planning   ROLA:      Code Status: Full Code   Is the patient medically ready for discharge?:     Reason for patient still in hospital (select all that apply): Patient trending condition, Laboratory test, Treatment, Consult recommendations and Pending disposition  Discharge Plan A: Home with family (with instructions to follow up)            Critical care time spent on the evaluation and treatment of severe organ dysfunction, review of pertinent labs and imaging studies, discussions with consulting providers and discussions with patient/family: 45 minutes.      Darren Gorman MD  Department of Hospital Medicine   [facility] [place] - Intensive Care

## 2022-09-18 NOTE — NURSING
Called placed to the [facility] office  and spoke to Miguel Suresh.. PEC paper work faxed to Miguel at the 's office  She  did confirmed that she received it.  . Primary nurse aware.

## 2022-09-18 NOTE — PLAN OF CARE
Pt had restraints d/c'd at 0900 this am. He was PEC'd until this afternoon and then the PEC was lifted. Pt has been oriented x's 4. He just gets very frustrated when he wants something. I explained to pt that if he wants to leave he will have to go AMA. Pt states that he is not going to leave AMA. Long discussion with pt about why he was PEC's. Pt very appreciative after discussion. He has remained in NSR today. He had good urine output. He was transferred to [facility] with no distress noted.

## 2022-09-18 NOTE — PSYCH
"I was called by the nurse to notify me that the patient wants to leave [place].  On my arrival the patient is patently erratic, screaming, and unable to sit and communicate logically.  After much encouragement, I was able to get him to scream at me, "my sugar goes haywire.  It's crazy.  I don't care.  I don't care if it's high or if I die."  I tried to reason with him, but he began to pace about the room and curse at me, unable to calm down.  His mother entered the room at this point and he decidedly worsened and a screaming match ensued.      I feel the patient does not have capacity to make adequate medical choices himself at this time, and presents a danger to himself.    I have filled out a PEC.  He continues to scream, "I don't care.  Let me leave."  Ativan 2mg iv x 1  Haldol 1mg iv x 1  4 point soft restraints  "

## 2022-09-18 NOTE — NURSING TRANSFER
Nursing Transfer Note      9/18/2022     Reason patient is being transferred: LOC    Transfer To: 428 from 269    Transfer via wheelchair    Transfer with cardiac monitoring    Transported by staff    Medicines sent: yes    Any special needs or follow-up needed: no    Chart send with patient: Yes    Notified: sister and mother notified    Patient reassessed at:  (date, time)    Upon arrival to floor: cardiac monitor applied, patient oriented to room, call bell in reach, and bed in lowest position

## 2022-09-18 NOTE — CLINICAL REVIEW
Will convert back to SSI protocol and DC DKA iv insulin drip      Recent Results (from the past 6 hour(s))   POCT glucose    Collection Time: 09/18/22 12:34 AM   Result Value Ref Range    POCT Glucose 242 (H) 70 - 110 mg/dL   POCT glucose    Collection Time: 09/18/22  1:33 AM   Result Value Ref Range    POCT Glucose 248 (H) 70 - 110 mg/dL   POCT glucose    Collection Time: 09/18/22  2:30 AM   Result Value Ref Range    POCT Glucose 207 (H) 70 - 110 mg/dL   Phosphorus    Collection Time: 09/18/22  3:25 AM   Result Value Ref Range    Phosphorus 1.8 (L) 2.7 - 4.5 mg/dL   CBC auto differential    Collection Time: 09/18/22  3:25 AM   Result Value Ref Range    WBC 6.25 3.90 - 12.70 K/uL    RBC 4.50 (L) 4.60 - 6.20 M/uL    Hemoglobin 11.9 (L) 14.0 - 18.0 g/dL    Hematocrit 36.4 (L) 40.0 - 54.0 %    MCV 81 (L) 82 - 98 fL    MCH 26.4 (L) 27.0 - 31.0 pg    MCHC 32.7 32.0 - 36.0 g/dL    RDW 14.7 (H) 11.5 - 14.5 %    Platelets 256 150 - 450 K/uL    MPV 9.1 (L) 9.2 - 12.9 fL    Immature Granulocytes 0.5 0.0 - 0.5 %    Gran # (ANC) 4.2 1.8 - 7.7 K/uL    Immature Grans (Abs) 0.03 0.00 - 0.04 K/uL    Lymph # 1.0 1.0 - 4.8 K/uL    Mono # 0.9 0.3 - 1.0 K/uL    Eos # 0.1 0.0 - 0.5 K/uL    Baso # 0.01 0.00 - 0.20 K/uL    nRBC 0 0 /100 WBC    Gran % 67.3 38.0 - 73.0 %    Lymph % 16.6 (L) 18.0 - 48.0 %    Mono % 13.8 4.0 - 15.0 %    Eosinophil % 1.6 0.0 - 8.0 %    Basophil % 0.2 0.0 - 1.9 %    Differential Method Automated    Magnesium    Collection Time: 09/18/22  3:25 AM   Result Value Ref Range    Magnesium 2.0 1.6 - 2.6 mg/dL   Basic metabolic panel    Collection Time: 09/18/22  3:25 AM   Result Value Ref Range    Sodium 136 136 - 145 mmol/L    Potassium 3.6 3.5 - 5.1 mmol/L    Chloride 106 95 - 110 mmol/L    CO2 22 (L) 23 - 29 mmol/L    Glucose 208 (H) 70 - 110 mg/dL    BUN 18 6 - 20 mg/dL    Creatinine 1.2 0.5 - 1.4 mg/dL    Calcium 8.3 (L) 8.7 - 10.5 mg/dL    Anion Gap 8 8 - 16 mmol/L    eGFR >60 >60 mL/min/1.73 m^2   POCT glucose     Collection Time: 09/18/22  3:25 AM   Result Value Ref Range    POCT Glucose 219 (H) 70 - 110 mg/dL   POCT glucose    Collection Time: 09/18/22  4:36 AM   Result Value Ref Range    POCT Glucose 176 (H) 70 - 110 mg/dL   Beta - Hydroxybutyrate, Serum    Collection Time: 09/18/22  5:16 AM   Result Value Ref Range    Beta-Hydroxybutyrate 0.6 (H) 0.0 - 0.5 mmol/L   POCT glucose    Collection Time: 09/18/22  5:20 AM   Result Value Ref Range    POCT Glucose 213 (H) 70 - 110 mg/dL

## 2022-09-19 PROBLEM — F19.94 SUBSTANCE INDUCED MOOD DISORDER: Status: ACTIVE | Noted: 2022-09-19

## 2022-09-19 PROBLEM — F41.1 GAD (GENERALIZED ANXIETY DISORDER): Status: RESOLVED | Noted: 2020-04-20 | Resolved: 2022-09-19

## 2022-09-19 PROBLEM — F11.20 POLYSUBSTANCE DEPENDENCE INCLUDING OPIOID TYPE DRUG, CONTINUOUS USE: Status: ACTIVE | Noted: 2022-09-19

## 2022-09-19 PROBLEM — F19.20 POLYSUBSTANCE DEPENDENCE INCLUDING OPIOID TYPE DRUG, CONTINUOUS USE: Status: ACTIVE | Noted: 2022-09-19

## 2022-09-19 LAB
BASOPHILS # BLD AUTO: 0.01 K/UL (ref 0–0.2)
BASOPHILS NFR BLD: 0.2 % (ref 0–1.9)
DIFFERENTIAL METHOD: ABNORMAL
EOSINOPHIL # BLD AUTO: 0.1 K/UL (ref 0–0.5)
EOSINOPHIL NFR BLD: 2.8 % (ref 0–8)
ERYTHROCYTE [DISTWIDTH] IN BLOOD BY AUTOMATED COUNT: 15.2 % (ref 11.5–14.5)
HCT VFR BLD AUTO: 43.4 % (ref 40–54)
HGB BLD-MCNC: 14.1 G/DL (ref 14–18)
IMM GRANULOCYTES # BLD AUTO: 0.02 K/UL (ref 0–0.04)
IMM GRANULOCYTES NFR BLD AUTO: 0.4 % (ref 0–0.5)
LYMPHOCYTES # BLD AUTO: 0.8 K/UL (ref 1–4.8)
LYMPHOCYTES NFR BLD: 17.3 % (ref 18–48)
MAGNESIUM SERPL-MCNC: 2.3 MG/DL (ref 1.6–2.6)
MCH RBC QN AUTO: 26.4 PG (ref 27–31)
MCHC RBC AUTO-ENTMCNC: 32.5 G/DL (ref 32–36)
MCV RBC AUTO: 81 FL (ref 82–98)
MONOCYTES # BLD AUTO: 0.5 K/UL (ref 0.3–1)
MONOCYTES NFR BLD: 10.8 % (ref 4–15)
NEUTROPHILS # BLD AUTO: 3.2 K/UL (ref 1.8–7.7)
NEUTROPHILS NFR BLD: 68.5 % (ref 38–73)
NRBC BLD-RTO: 0 /100 WBC
PHOSPHATE SERPL-MCNC: 1.8 MG/DL (ref 2.7–4.5)
PLATELET # BLD AUTO: 281 K/UL (ref 150–450)
PMV BLD AUTO: 8.9 FL (ref 9.2–12.9)
POCT GLUCOSE: 106 MG/DL (ref 70–110)
POCT GLUCOSE: 392 MG/DL (ref 70–110)
POCT GLUCOSE: 458 MG/DL (ref 70–110)
POCT GLUCOSE: 94 MG/DL (ref 70–110)
RBC # BLD AUTO: 5.35 M/UL (ref 4.6–6.2)
WBC # BLD AUTO: 4.63 K/UL (ref 3.9–12.7)

## 2022-09-19 PROCEDURE — 63600175 PHARM REV CODE 636 W HCPCS: Performed by: INTERNAL MEDICINE

## 2022-09-19 PROCEDURE — 25000003 PHARM REV CODE 250: Performed by: STUDENT IN AN ORGANIZED HEALTH CARE EDUCATION/TRAINING PROGRAM

## 2022-09-19 PROCEDURE — 84100 ASSAY OF PHOSPHORUS: CPT | Performed by: STUDENT IN AN ORGANIZED HEALTH CARE EDUCATION/TRAINING PROGRAM

## 2022-09-19 PROCEDURE — S0166 INJ OLANZAPINE 2.5MG: HCPCS | Performed by: PSYCHIATRY & NEUROLOGY

## 2022-09-19 PROCEDURE — 11000001 HC ACUTE MED/SURG PRIVATE ROOM

## 2022-09-19 PROCEDURE — 25000003 PHARM REV CODE 250: Performed by: INTERNAL MEDICINE

## 2022-09-19 PROCEDURE — 85025 COMPLETE CBC W/AUTO DIFF WBC: CPT | Performed by: STUDENT IN AN ORGANIZED HEALTH CARE EDUCATION/TRAINING PROGRAM

## 2022-09-19 PROCEDURE — 63600175 PHARM REV CODE 636 W HCPCS: Performed by: STUDENT IN AN ORGANIZED HEALTH CARE EDUCATION/TRAINING PROGRAM

## 2022-09-19 PROCEDURE — 83735 ASSAY OF MAGNESIUM: CPT | Performed by: STUDENT IN AN ORGANIZED HEALTH CARE EDUCATION/TRAINING PROGRAM

## 2022-09-19 PROCEDURE — 36415 COLL VENOUS BLD VENIPUNCTURE: CPT | Performed by: STUDENT IN AN ORGANIZED HEALTH CARE EDUCATION/TRAINING PROGRAM

## 2022-09-19 PROCEDURE — 90792 PR PSYCHIATRIC DIAGNOSTIC EVALUATION W/MEDICAL SERVICES: ICD-10-PCS | Mod: ,,, | Performed by: PSYCHIATRY & NEUROLOGY

## 2022-09-19 PROCEDURE — 90792 PSYCH DIAG EVAL W/MED SRVCS: CPT | Mod: ,,, | Performed by: PSYCHIATRY & NEUROLOGY

## 2022-09-19 PROCEDURE — 25000003 PHARM REV CODE 250: Performed by: PSYCHIATRY & NEUROLOGY

## 2022-09-19 RX ORDER — OLANZAPINE 10 MG/2ML
10 INJECTION, POWDER, FOR SOLUTION INTRAMUSCULAR EVERY 8 HOURS PRN
Status: DISCONTINUED | OUTPATIENT
Start: 2022-09-19 | End: 2022-09-20

## 2022-09-19 RX ORDER — MUPIROCIN 20 MG/G
OINTMENT TOPICAL 2 TIMES DAILY
Status: DISCONTINUED | OUTPATIENT
Start: 2022-09-19 | End: 2022-09-20 | Stop reason: HOSPADM

## 2022-09-19 RX ORDER — GUAIFENESIN 100 MG/5ML
200 SOLUTION ORAL EVERY 4 HOURS PRN
Status: DISCONTINUED | OUTPATIENT
Start: 2022-09-19 | End: 2022-09-20 | Stop reason: HOSPADM

## 2022-09-19 RX ADMIN — INSULIN ASPART 5 UNITS: 100 INJECTION, SOLUTION INTRAVENOUS; SUBCUTANEOUS at 07:09

## 2022-09-19 RX ADMIN — SODIUM CHLORIDE 125 ML/HR: 0.9 INJECTION, SOLUTION INTRAVENOUS at 06:09

## 2022-09-19 RX ADMIN — ACETAMINOPHEN 650 MG: 325 TABLET ORAL at 06:09

## 2022-09-19 RX ADMIN — INSULIN ASPART 10 UNITS: 100 INJECTION, SOLUTION INTRAVENOUS; SUBCUTANEOUS at 09:09

## 2022-09-19 RX ADMIN — HALOPERIDOL LACTATE 2 MG: 5 INJECTION, SOLUTION INTRAMUSCULAR at 01:09

## 2022-09-19 RX ADMIN — INSULIN ASPART 10 UNITS: 100 INJECTION, SOLUTION INTRAVENOUS; SUBCUTANEOUS at 07:09

## 2022-09-19 RX ADMIN — OLANZAPINE 10 MG: 10 INJECTION, POWDER, LYOPHILIZED, FOR SOLUTION INTRAMUSCULAR at 10:09

## 2022-09-19 RX ADMIN — OXYCODONE 5 MG: 5 TABLET ORAL at 02:09

## 2022-09-19 RX ADMIN — POTASSIUM PHOSPHATE, MONOBASIC AND POTASSIUM PHOSPHATE, DIBASIC 30 MMOL: 224; 236 INJECTION, SOLUTION, CONCENTRATE INTRAVENOUS at 10:09

## 2022-09-19 RX ADMIN — ENOXAPARIN SODIUM 40 MG: 100 INJECTION SUBCUTANEOUS at 05:09

## 2022-09-19 RX ADMIN — HALOPERIDOL LACTATE 2 MG: 5 INJECTION, SOLUTION INTRAMUSCULAR at 02:09

## 2022-09-19 RX ADMIN — OLANZAPINE 10 MG: 10 INJECTION, POWDER, LYOPHILIZED, FOR SOLUTION INTRAMUSCULAR at 02:09

## 2022-09-19 RX ADMIN — INSULIN ASPART 5 UNITS: 100 INJECTION, SOLUTION INTRAVENOUS; SUBCUTANEOUS at 05:09

## 2022-09-19 RX ADMIN — INSULIN DETEMIR 20 UNITS: 100 INJECTION, SOLUTION SUBCUTANEOUS at 09:09

## 2022-09-19 NOTE — NURSING
[facility], [facility]  Nurses Note -- 4 Eyes      9/18/2022      Received from ICU.No Skin problems noted  Skin assessed on:  Transfer      [x] No Pressure Injuries Present    []Prevention Measures Documented    [] Yes LDA  for Pressure Injury Previously documented     [] Yes New Pressure Injury Discovered   [] LDA for New Pressure Injury Added      Attending RN:  Regine Boland, RITA     Second staff Vivian Diamond PCT

## 2022-09-19 NOTE — PROGRESS NOTES
Nutrition-Related Diabetes Education      Time Spent: 0 minutes    Learners: Patient    Current HbA1c: 10.8    Is patient aware of their A1c and their goal A1c?   yes      Nutrition Education with handouts:   + Clinical Reference attachments to d/c documents      Comments:  Pt not seen due to non-compliance in the past after receiving multiple educations and due to current temperament. Nutrition Edu handouts attached to d/c docs.         Barriers to Learning:   Non-compliance, behavior      Thank you!  Fernando Perez, MPH, RDN, LDN

## 2022-09-19 NOTE — CONSULTS
"[facility] - [facility] Surg  Psychiatry  Consult Note    Patient Name: Cali Mabry  MRN: [id number]   Code Status: Full Code  Admission Date: 9/16/2022  Hospital Length of Stay: 2 days  Attending Physician: Tyrese Dickinson MD  Primary Care Provider: Primary Doctor No    Current Legal Status: Physician's Emergency Certificate (PEC)    Patient information was obtained from patient, ER records and chart review, nursing.   Inpatient consult to Psychiatry  Consult performed by: Mahamed Menendez MD  Consult ordered by: PAWEL Mendieta MD        Subjective:     Principal Problem:Diabetic ketoacidosis without coma associated with type 1 diabetes mellitus    Chief Complaint:  Agitation; drug use     HPI: Patient Cali Mabry presents to the hospital with complaints of elevated blood sugars and admitted with DKA.  He was agitated and screaming, requiring PEC and restraints.  Upon my entering his room, he is screaming loudly, using profanities.  "Get the fuck out of here you fag."  Constantly cursing and yelling.  Says that he wants to leave and that he needs to talk to his mother.  He will not answer most of my questions.  He does say that he doesn't care "why the fuck" he is here and is ready to go.  Apparently, mother came visit and had to be escorted out by security because she was getting elevated and egging him on.  He is screaming and says that he has not medical problems or medications.  He then says that he needs his insulin.  Nursing brings in an injection to help calm him down and he is refusing, and he kicked me in the arm.    Chart review, shows that patient has long history of substance abuse, mostly heroin and amphetamines. Unknown last use but toxicology screen was negative.  No chart history of other psychiatric problems.      Hospital Course: No notes on file         Patient History               Medical as of 9/19/2022       Past Medical History       Diagnosis Date Comments Source    Acute " on chronic pancreatitis 04/03/2016 -- Provider    Addiction to drug -- -- Provider    CHF (congestive heart failure) -- -- Provider    DKA (diabetic ketoacidoses) 02/2020 -- Provider    DM (diabetes mellitus), type 1 -- -- Provider    HCV (hepatitis C virus) -- high VL  Provider    HTN (hypertension) -- -- Provider    Hx of psychiatric care -- -- Provider    Hypomagnesemia 05/25/2020 -- Provider    IVDU (intravenous drug user) -- Heroin Provider    Pancreatitis -- -- Provider    Psychiatric problem -- -- Provider    Substance abuse -- -- Provider              Pertinent Negatives       Diagnosis Date Noted Comments Source    History of psychiatric hospitalization 09/19/2022 -- Provider    Therapy 09/19/2022 -- Provider                          Surgical as of 9/19/2022       Past Surgical History       Procedure Laterality Date Comments Source    SHOULDER SURGERY -- 2013 right shoulder, spur removal. Provider    CARDIAC SURGERY -- 1999 stent placed. ([facility]) Provider    ESOPHAGOGASTRODUODENOSCOPY N/A 3/5/2020 Gastritis.  No H pylori.  Completed PPI for 1 month.  Procedure: EGD (ESOPHAGOGASTRODUODENOSCOPY);  Surgeon: Brinda Rene MD;  Location: [facility] ENDO;  Service: Endoscopy;  Laterality: N/A;  W421 A; [id number] Provider    LEFT HEART CATHETERIZATION Left 9/28/2020 Procedure: Left heart cath;  Surgeon: Fito Rangel MD;  Location: [facility] CATH LAB;  Service: Cardiology;  Laterality: Left; Provider                          Family as of 9/19/2022       Problem Relation Name Age of Onset Comments Source    Asthma Mother -- -- -- Provider                  Tobacco Use as of 9/19/2022       Smoking Status Smoking Start Date Last Attempt to Quit Smoking Frequency    Every Day 4/3/1981 6/1/2020 1 pack/day for 20.00 years (20.00 pk-yrs)      Smokeless Status Smokeless Type Smokeless Quit Date    Never -- --      Tobacco Comments    states approx 1 per day.      Source    Provider                  Alcohol Use as of  9/19/2022       Alcohol Use Drinks/Week Alcohol/Week Comments Source    Not Currently 0 Standard drinks or equivalent 0.0 standard drinks -- Provider                  Drug Use as of 9/19/2022       Drug Use Types Frequency Comments Source    Yes  Heroin, IV, Marijuana, Amphetamines -- -- Provider                  Sexual Activity as of 9/19/2022       Sexually Active Birth Control Partners Comments Source    Yes -- Female -- Provider                  Activities of Daily Living as of 9/19/2022    None               Social Documentation as of 9/19/2022    None               Occupational as of 9/19/2022    None               Socioeconomic as of 9/19/2022       Marital Status Spouse Name Number of Children Years Education Education Level Preferred Language Ethnicity Race Source    Single -- -- -- -- English Not  or /a White --                  Pertinent History       Question Response Comments    Lives with family mother    Place in Birth Order -- --    Lives in home --    Number of Siblings -- --    Raised by -- --    Legal Involvement -- --    Childhood Trauma -- --    Criminal History of -- --    Financial Status unemployed --    Highest Level of Education -- --    Does patient have access to a firearm? -- --     Service -- --    Primary Leisure Activity -- --    Spirituality -- --          Past Medical History:   Diagnosis Date    Acute on chronic pancreatitis 04/03/2016    Addiction to drug     CHF (congestive heart failure)     DKA (diabetic ketoacidoses) 02/2020    DM (diabetes mellitus), type 1     HCV (hepatitis C virus)     high VL     HTN (hypertension)     Hx of psychiatric care     Hypomagnesemia 05/25/2020    IVDU (intravenous drug user)     Heroin    Pancreatitis     Psychiatric problem     Substance abuse      Past Surgical History:   Procedure Laterality Date    CARDIAC SURGERY  1999    stent placed. ([facility] [place] [place])    ESOPHAGOGASTRODUODENOSCOPY N/A 3/5/2020     Gastritis.  No H pylori.  Completed PPI for 1 month.  Procedure: EGD (ESOPHAGOGASTRODUODENOSCOPY);  Surgeon: Brinda Rene MD;  Location: [facility] ENDO;  Service: Endoscopy;  Laterality: N/A;  W421 A; x5445    LEFT HEART CATHETERIZATION Left 2020    Procedure: Left heart cath;  Surgeon: Fito Rangel MD;  Location: [facility] CATH LAB;  Service: Cardiology;  Laterality: Left;    SHOULDER SURGERY      right shoulder, spur removal.     Family History       Problem Relation (Age of Onset)    Asthma Mother          Tobacco Use    Smoking status: Every Day     Packs/day: 1.00     Years: 20.00     Pack years: 20.00     Types: Cigarettes     Start date: 4/3/1981     Last attempt to quit: 2020     Years since quittin.3    Smokeless tobacco: Never    Tobacco comments:     states approx 1 per day.   Substance and Sexual Activity    Alcohol use: Not Currently     Alcohol/week: 0.0 standard drinks    Drug use: Yes     Types: Heroin, IV, Marijuana, Amphetamines    Sexual activity: Yes     Partners: Female     Review of patient's allergies indicates:  No Known Allergies    No current facility-administered medications on file prior to encounter.     Current Outpatient Medications on File Prior to Encounter   Medication Sig    albuterol (PROVENTIL/VENTOLIN HFA) 90 mcg/actuation inhaler Inhale 2 puffs into the lungs every 4 (four) hours as needed for Wheezing or Shortness of Breath. Rescue    aspirin 81 MG Chew Take 1 tablet (81 mg total) by mouth once daily.    atorvastatin (LIPITOR) 20 MG tablet Take 20 mg by mouth once daily.    b complex vitamins tablet Take 1 tablet by mouth once daily.    bisacodyL (DULCOLAX) 10 mg Supp Place 10 mg rectally daily as needed.    blood sugar diagnostic Strp Use To check blood glucose three times daily,    blood-glucose meter Misc To check Blood glucose three times daily,    calcium carbonate (TUMS) 200 mg calcium (500 mg) chewable tablet Take 1 tablet (500 mg  "total) by mouth daily as needed for Heartburn.    clopidogreL (PLAVIX) 75 mg tablet Take 1 tablet (75 mg total) by mouth once daily. (Patient not taking: Reported on 9/2/2022)    diphenhydrAMINE (BENADRYL) 25 mg capsule Take 25 mg by mouth every 6 (six) hours as needed for Itching.    DULoxetine (CYMBALTA) 60 MG capsule Take 1 capsule (60 mg total) by mouth once daily. (Patient not taking: Reported on 9/2/2022)    gabapentin (NEURONTIN) 300 MG capsule Take 2 capsules (600 mg total) by mouth 3 (three) times daily. (Patient not taking: Reported on 9/16/2022)    glucagon (GLUCAGON, HUMAN RECOMBINANT,) 1 mg SolR Inject 1 mg into the muscle as needed.    glucose 4 GM chewable tablet Take 16 g by mouth as needed for Low blood sugar.    HALOPERIDOL LACTATE INJ Inject 1 mg as directed every 4 (four) hours as needed.    insulin (LANTUS SOLOSTAR U-100 INSULIN) glargine 100 units/mL (3mL) SubQ pen Inject 42 Units into the skin every evening.    insulin lispro 100 unit/mL pen Inject 10 Units into the skin 3 (three) times daily with meals.    insulin regular (HUMULIN R REGULAR U-100 INSULN) 100 unit/mL Inj injection Inject 6 Units into the skin 3 (three) times daily before meals.    insulin syringe-needle U-100 0.5 mL 31 gauge x 5/16" Syrg 1 each by Misc.(Non-Drug; Combo Route) route 3 (three) times daily.    ketorolac (TORADOL) 10 mg tablet Take 1 tablet (10 mg total) by mouth every 6 (six) hours as needed. (Patient not taking: Reported on 9/16/2022)    L. acidophilus/Strept/La p-whitney (RISAQUAD ORAL) Take 1 capsule by mouth once daily.    lancets Misc To check BG 3 times daily, to use with insurance preferred meter    LIDOcaine (LIDODERM) 5 % Place 1 patch onto the skin once daily. Remove & Discard patch within 12 hours or as directed by MD (Patient not taking: Reported on 9/16/2022)    melatonin (MELATIN) 3 mg tablet Take 2 tablets (6 mg total) by mouth nightly as needed for Insomnia. (Patient not taking: " "Reported on 9/16/2022)    mineral oil (FLEET OIL RETENTION) enema Place 1 enema rectally daily as needed for Constipation.    naloxone (NARCAN) 4 mg/actuation Spry 4mg by nasal route as needed for opioid overdose; may repeat every 2-3 minutes in alternating nostrils until medical help arrives. Call 911    naloxone (NARCAN) 4 mg/actuation Spry 4mg by nasal route as needed for opioid overdose; may repeat every 2-3 minutes in alternating nostrils until medical help arrives. Call 911    nitroGLYCERIN (NITROSTAT) 0.4 MG SL tablet Place 1 tablet (0.4 mg total) under the tongue every 5 (five) minutes as needed for Chest pain. (Patient not taking: Reported on 9/16/2022)    pen needle, diabetic (CLICKFINE PEN NEEDLE) 32 gauge x 5/32" Ndle 1 each by Misc.(Non-Drug; Combo Route) route 4 (four) times daily before meals and nightly.    QUEtiapine (SEROQUEL) 50 MG tablet Take 50 mg by mouth every evening.    traZODone (DESYREL) 50 MG tablet Take 25 mg by mouth every evening.    [DISCONTINUED] insulin aspart U-100 (NOVOLOG) 100 unit/mL injection Inject 1-10 Units into the skin 3 (three) times daily with meals.     Psychotherapeutics (From admission, onward)      Start     Stop Route Frequency Ordered    09/19/22 1457  OLANZapine injection 10 mg         -- IM Every 8 hours PRN 09/19/22 1358    09/18/22 2134  haloperidol lactate injection 2 mg         -- IV Every 6 hours PRN 09/18/22 2034 09/17/22 2226  LORazepam injection 2 mg         -- IV Every 4 hours PRN 09/17/22 2126 09/17/22 0315  QUEtiapine tablet 50 mg         -- Oral Nightly 09/17/22 0216    09/17/22 0315  trazodone split tablet 25 mg         -- Oral Nightly 09/17/22 0216          Review of Systems   Unable to perform ROS: Psychiatric disorder   Strengths and Liabilities: Liability: Patient is defensive., Liability: Patient is dependent., Liability: Patient has poor health., Liability: Patient has poor judgment, Liability: Patient lacks coping " "skills.    Objective:     Vital Signs (Most Recent):  Temp: 97.6 °F (36.4 °C) (09/19/22 1151)  Pulse: 102 (09/19/22 1151)  Resp: 14 (09/19/22 1151)  BP: 125/76 (09/19/22 1151)  SpO2: 98 % (09/19/22 1151) Vital Signs (24h Range):  Temp:  [97.5 °F (36.4 °C)-97.8 °F (36.6 °C)] 97.6 °F (36.4 °C)  Pulse:  [] 102  Resp:  [14-33] 14  SpO2:  [98 %-100 %] 98 %  BP: (125-148)/(74-80) 125/76     Height: 5' 8" (172.7 cm)  Weight: 51.7 kg (113 lb 15.7 oz)  Body mass index is 17.33 kg/m².      Intake/Output Summary (Last 24 hours) at 9/19/2022 1418  Last data filed at 9/19/2022 1215  Gross per 24 hour   Intake 480 ml   Output 1125 ml   Net -645 ml       Physical Exam  Psychiatric:      Comments: EXAMINATION    CONSTITUTIONAL  General Appearance: hospital attire; restrained to bed    MUSCULOSKELETAL  Muscle Strength and Tone: normal  Abnormal Involuntary Movements: none noted  Gait and Station: not observed    PSYCHIATRIC MENTAL STATUS EXAM   Level of Consciousness: awake and alert  Orientation: will not answer  Grooming: poor; disheveled  Psychomotor Behavior: restless and fidgety  Speech: pressured and loud  Language: no abnormalities  Mood: will not answer  Affect: irritable; labile  Thought Process: can be linear at times  Associations: loose at times  Thought Content: will not answer; does not appear to be responding to stimuli  Memory: will not answer  Attention: distracted  Fund of Knowledge: cannot assess  Insight: poor into current medical or mental or drug use problems  Judgment: poor into redirection or cooperation with care            Significant Labs: All pertinent labs within the past 24 hours have been reviewed.    Significant Imaging: I have reviewed all pertinent imaging results/findings within the past 24 hours.    Assessment/Plan:     Substance induced mood disorder  Patient with long history of non-compliance with medical care and also with drug use.  Currently gravely disabled due to poor self care.  " Agree with PEC and 1:1 sitter.  Notify  of commitment process.  Note that the PEC is filled out incorrectly and demographics needs to be completed and back signed by a treating physician (the [facility] sticker does not count for demographics on state PEC form).  Utilize olanzapine 10 mg IM every 8 hours PRN acute psychotic or non-redirectable agitation, first dose now.  Hopefully as he medically improves and a few doses of mood stabilizing anti-psychotic, he will return to somewhat of a functional baseline.      Polysubstance dependence including opioid type drug, continuous use  Long chronic history of substance abuse, mostly IV heroin and amphetamines.  Can possibly be in a withdrawal state but doesn't seem to fully present like this at this time.  Will need counseling and education but is not listening at this time.         Total Time:  60 minutes      Mahamed Menendez MD   Psychiatry  [facility] - [place], [place] - Med Surg

## 2022-09-19 NOTE — NURSING
Pt removed gown, got dressed in his clothing and attempted to leave room. Pt refusing to let nurse touch him, or to remove his IV access. Pt unsteady, but refusing assistance. Security called to assist with pt.

## 2022-09-19 NOTE — PSYCH
"I was again called to Mr. Mabry' room due to violent, illogical behavior.  His glucose dropped to below 60 and he became confused and pulled out all of his IV's.  He is screaming, threatening staff with physical violence, and stating, "I really don't care if I die.  I don't.  This whole life is worthless.  I just want it to end."      I tried to reasonably rationalize with him over his medical concerns, hypoglycemic confused events, with alternating hyperglycemia (recheck is now >500).  He is unable to even cyndy on basic conversation with me, frequently screaming at us in [place], then making odd, tangential comments not pertaining to anything relevant to his care.  He states he does not care about his diabetes anyway, but cannot focus enough to attempt to explain his logic to me.     I had to PEC him last night for similar psychotic, threatening, and self-endangering behavior.  This was rescinded by the day doctor, after he was found to be more manageable after receiving ativan and Haldol over night.  His profound confusion and illogical behavior has now returned.  Beyond a doubt, he has no insight into his illness and I feel he continues to be a danger to himself due to underlying psychiatric issues.     PEC order in place.  I am re-ordering the psychiatry consult.  "

## 2022-09-19 NOTE — NURSING
Patient remains very agitated with frequent outburst and profanity used. When tried to give zyprexa trying to get out of restraints and kicking

## 2022-09-19 NOTE — PLAN OF CARE
Calmer at this time.restraints release trial fail is not following simple command after consumed diet and fed self.  Problem: Coping Ineffective  Goal: Effective Coping  Outcome: Ongoing, Progressing  Intervention: Support and Enhance Coping Strategies  Flowsheets (Taken 9/19/2022 9959)  Supportive Measures:   active listening utilized   verbalization of feelings encouraged   positive reinforcement provided  Environmental Support:   calm environment promoted   rest periods encouraged   distractions minimized     Problem: Mood Impairment (Anxiety Signs/Symptoms)  Goal: Improved Mood Symptoms (Anxiety Signs/Symptoms)  Outcome: Ongoing, Progressing  Intervention: Optimize Emotion and Mood  Flowsheets (Taken 9/19/2022 1121)  Supportive Measures:   active listening utilized   verbalization of feelings encouraged   positive reinforcement provided

## 2022-09-19 NOTE — NURSING
"Pt refusing to have vitals and blood sugar taken for this evening. Pt in room yelling and cussing at staff stating "don't fucking touch me. Get me the fuck out of here'. Pt educated that his last blood sugar was very low and that it needed to be rechecked. Pt being non-compliant and being abusive to staff. Will continue to monitor.   "

## 2022-09-19 NOTE — ASSESSMENT & PLAN NOTE
Long chronic history of substance abuse, mostly IV heroin and amphetamines.  Can possibly be in a withdrawal state but doesn't seem to fully present like this at this time.  Will need counseling and education but is not listening at this time.

## 2022-09-19 NOTE — SUBJECTIVE & OBJECTIVE
Interval History:  Calm in restraints.     Review of Systems   Unable to perform ROS: Acuity of condition   Objective:     Vital Signs (Most Recent):  Temp: 97.8 °F (36.6 °C) (09/19/22 0747)  Pulse: 100 (09/19/22 0747)  Resp: 18 (09/19/22 0747)  BP: (!) 142/80 (09/19/22 0747)  SpO2: 99 % (09/18/22 1747)   Vital Signs (24h Range):  Temp:  [97.3 °F (36.3 °C)-97.8 °F (36.6 °C)] 97.8 °F (36.6 °C)  Pulse:  [] 100  Resp:  [11-33] 18  SpO2:  [99 %-100 %] 99 %  BP: (117-148)/(58-80) 142/80     Weight: 51.7 kg (113 lb 15.7 oz)  Body mass index is 17.33 kg/m².    Intake/Output Summary (Last 24 hours) at 9/19/2022 0904  Last data filed at 9/19/2022 0540  Gross per 24 hour   Intake 480 ml   Output 1125 ml   Net -645 ml      Physical Exam  Vitals and nursing note reviewed.   Constitutional:       General: He is not in acute distress.     Appearance: He is normal weight. He is not ill-appearing, toxic-appearing or diaphoretic.   HENT:      Head: Normocephalic and atraumatic.   Eyes:      Conjunctiva/sclera: Conjunctivae normal.   Cardiovascular:      Rate and Rhythm: Normal rate and regular rhythm.   Pulmonary:      Effort: Pulmonary effort is normal. No respiratory distress.   Musculoskeletal:         General: No swelling.   Skin:     General: Skin is warm and dry.   Neurological:      Mental Status: He is alert. Mental status is at baseline.       Significant Labs: All pertinent labs within the past 24 hours have been reviewed.  CBC:   Recent Labs   Lab 09/18/22  0325 09/19/22  0554   WBC 6.25 4.63   HGB 11.9* 14.1   HCT 36.4* 43.4    281     CMP:   Recent Labs   Lab 09/17/22  1457 09/17/22  2315 09/18/22  0325   * 135* 136   K 4.0 3.3* 3.6    108 106   CO2 15* 17* 22*   * 295* 208*   BUN 22* 20 18   CREATININE 1.4 1.2 1.2   CALCIUM 7.8* 8.0* 8.3*   ANIONGAP 12 10 8       Significant Imaging:

## 2022-09-19 NOTE — HPI
"Patient Cali Mabry presents to the hospital with complaints of elevated blood sugars and admitted with DKA.  He was agitated and screaming, requiring PEC and restraints.  Upon my entering his room, he is screaming loudly, using profanities.  "Get the fuck out of here you fag."  Constantly cursing and yelling.  Says that he wants to leave and that he needs to talk to his mother.  He will not answer most of my questions.  He does say that he doesn't care "why the fuck" he is here and is ready to go.  Apparently, mother came visit and had to be escorted out by security because she was getting elevated and egging him on.  He is screaming and says that he has not medical problems or medications.  He then says that he needs his insulin.  Nursing brings in an injection to help calm him down and he is refusing, and he kicked me in the arm.    Chart review, shows that patient has long history of substance abuse, mostly heroin and amphetamines. Unknown last use but toxicology screen was negative.  No chart history of other psychiatric problems.  "

## 2022-09-19 NOTE — NURSING
PEC paper work faxed to SALVATORE Walker's office [phone]. They were also called. Spoke to Siddhartha at [phone].

## 2022-09-19 NOTE — NURSING
Security and MD called for pt being combative, aggressive, and verbally abusive too staff. Pt pulled out all his IV access, and bleeding. Pt refusing care from nurse. Once MD and security arrived pt allowed nurse to take his blood sugar which was >500. Pt also became verbally abusive to MD and security. Pt unable to answer questions appropriately, start speaking a different language when MD asks him questions. MD decided to PEC pt. Security escorted pt to bed. Charge gained access to new peripheral IV. Haldol administered per MD order. Pt still being combative, security put pt  on restraints, BUE wrists per MD order. Pt still currently yelling and screaming profanities at staff in his room. Will continue to monitor pt.

## 2022-09-19 NOTE — PROGRESS NOTES
"[facility]  Progress Note    Patient Name: Cali Mabry  MRN: [id number]  Patient Class: IP- Inpatient   Admission Date: 9/16/2022  Length of Stay: 2 days  Attending Physician: Tyrese Dickinson MD  Primary Care Provider: Primary Doctor No        Subjective:     Principal Problem:Diabetic ketoacidosis without coma associated with type 1 diabetes mellitus        HPI:  Mr. Mabry is a 58yo man with a past medical history of HTN, IV heroin abuse, DM1 with DKA, Hep C, pancreatitis, and COPD due to long term cigarette smoking.  He is known to me from admitting him on 8/7/22 for COPD exacerbation to .    I examined and interviewed him in the ED.  He is very lethargic, and will only answer simple questions, sometimes refusing altogether.  He states, "I feel like death."  When asked how exactly, he responds, "I don't know, just not good."  I was able to gather that he hurts all over, had some N/V for several days and vague abdominal discomfort.  Of note, he stopped taking all of his insulin over a week ago (he would not tell me why).  I did ask if he was still using IV heroin, but he said, "not for a year now."    In the ED his VSs were BP (!) 185/88   Pulse (!) 111   Temp 98.7 °F (37.1 °C) (Oral)   Resp (!) 22   Ht 5' 8" (1.727 m)   Wt 65.8 kg (145 lb)   SpO2 100%   BMI 22.05 kg/m².  His labs showed a normal CBC, Na 131, K 5.7, AG 32, BUN 27, Cr 1.9 (baseline ) glucose 736, and normal LFT.  Trop 0.006, LA 2.8, BHB 6.3.  COVID/FLU NEG, ETOH NEG, VBG pH 7.059, PCO2 40.    CXR showed no acute abnormalities.  EKG showed sinus tachycardia with occasional Premature ventricular complexes, rate 111, right atrial enlargement, and possible Anterior infarct ,age undetermined.    In the ED he was treated with Insulin %U IV 2344, Zofran 4mg iv 2339, Zosyn 4.5g iv 0201, NS 1L bolus 0111 & 2340.                     Overview/Hospital Course:  Mr. Mabry was admitted to ICU with DKA. " Started on DKA pathway. Pt became agitated overnight and was PEC'd by Dr. Mendieta. Gap closed. Patient transferred to the floor. Psych consulted.         Interval History:  Calm in restraints.     Review of Systems   Unable to perform ROS: Acuity of condition   Objective:     Vital Signs (Most Recent):  Temp: 97.8 °F (36.6 °C) (09/19/22 0747)  Pulse: 100 (09/19/22 0747)  Resp: 18 (09/19/22 0747)  BP: (!) 142/80 (09/19/22 0747)  SpO2: 99 % (09/18/22 1747)   Vital Signs (24h Range):  Temp:  [97.3 °F (36.3 °C)-97.8 °F (36.6 °C)] 97.8 °F (36.6 °C)  Pulse:  [] 100  Resp:  [11-33] 18  SpO2:  [99 %-100 %] 99 %  BP: (117-148)/(58-80) 142/80     Weight: 51.7 kg (113 lb 15.7 oz)  Body mass index is 17.33 kg/m².    Intake/Output Summary (Last 24 hours) at 9/19/2022 0904  Last data filed at 9/19/2022 0540  Gross per 24 hour   Intake 480 ml   Output 1125 ml   Net -645 ml      Physical Exam  Vitals and nursing note reviewed.   Constitutional:       General: He is not in acute distress.     Appearance: He is normal weight. He is not ill-appearing, toxic-appearing or diaphoretic.   HENT:      Head: Normocephalic and atraumatic.   Eyes:      Conjunctiva/sclera: Conjunctivae normal.   Cardiovascular:      Rate and Rhythm: Normal rate and regular rhythm.   Pulmonary:      Effort: Pulmonary effort is normal. No respiratory distress.   Musculoskeletal:         General: No swelling.   Skin:     General: Skin is warm and dry.   Neurological:      Mental Status: He is alert. Mental status is at baseline.       Significant Labs: All pertinent labs within the past 24 hours have been reviewed.  CBC:   Recent Labs   Lab 09/18/22  0325 09/19/22  0554   WBC 6.25 4.63   HGB 11.9* 14.1   HCT 36.4* 43.4    281     CMP:   Recent Labs   Lab 09/17/22  1457 09/17/22  2315 09/18/22  0325   * 135* 136   K 4.0 3.3* 3.6    108 106   CO2 15* 17* 22*   * 295* 208*   BUN 22* 20 18   CREATININE 1.4 1.2 1.2   CALCIUM 7.8* 8.0*  8.3*   ANIONGAP 12 10 8       Significant Imaging:       Assessment/Plan:      * Diabetic ketoacidosis without coma associated with type 1 diabetes mellitus  Suspected DKA due to insulin non-compliance. Gap resolved.     Most recent fingerstick glucose reviewed-   Recent Labs   Lab 09/18/22 2021 09/18/22  2107 09/19/22  0706 09/19/22  0746   POCTGLUCOSE >500* >500* 458* 392*     Current correctional scale  High  Increase anti-hyperglycemic dose as follows-   Antihyperglycemics (From admission, onward)    Start     Stop Route Frequency Ordered    09/20/22 0900  insulin detemir U-100 pen 30 Units         -- SubQ Daily 09/19/22 0903    09/18/22 0715  insulin aspart U-100 pen 5 Units         -- SubQ 3 times daily with meals 09/18/22 0535    09/18/22 0635  insulin aspart U-100 pen 1-10 Units         -- SubQ Before meals & nightly PRN 09/18/22 0535        Resolved        COPD (chronic obstructive pulmonary disease)    albuterol-ipratropium 2.5 mg-0.5 mg/3 mL nebulizer solution 3 mL, 3 mL, Nebulization, Q4H PRN     albuterol-ipratropium 2.5 mg-0.5 mg/3 mL nebulizer solution 3 mL, 3 mL, Nebulization, Q8H     Hyperkalemia  Resolved      Encounter for assessment of decision-making capacity  Pt agitated overnight. Assessed by Dr. Mendieta who felt he did not have capacity to make medical decisions. Has since had numerous sedating medications due to agitation, unable to re-assess currently.   - cont PEC  - reassess as medications wear off  - may need psych assessment      Noncompliance with medication regimen  He has not taken any insulin in over a week      RAMYA (acute kidney injury)  Improved. Baseline 1      History of heroin abuse  He states that he last used over a year ago      Tobacco use disorder, moderate, dependence  Not counseled on cessation, as too lethargic and uncooperative      Chronic relapsing pancreatitis  Lipase negative. No evidence of pancreatitis currently      Chronic hepatitis C  Follow up with  Hepatology  H.O IVDA with heroin       Essential hypertension  Hold norvasc for borderline pressures    Agitation/confusion/threatening behavior- patient is PEC'd. Psych consulted.     VTE Risk Mitigation (From admission, onward)         Ordered     enoxaparin injection 40 mg  Daily         09/17/22 0216     Place sequential compression device  Until discontinued         09/17/22 0216     Place DAWIT hose  Until discontinued         09/17/22 0216     IP VTE HIGH RISK PATIENT  Once         09/17/22 0216                Discharge Planning   ROLA:      Code Status: Full Code   Is the patient medically ready for discharge?:     Reason for patient still in hospital (select all that apply): Patient unstable  Discharge Plan A: Home with family (with instructions to follow up)                  Tyrese Singh MD  Department of Hospital Medicine   [facility]

## 2022-09-19 NOTE — ASSESSMENT & PLAN NOTE
Patient with long history of non-compliance with medical care and also with drug use.  Currently gravely disabled due to poor self care.  Agree with PEC and 1:1 sitter.  Notify  of commitment process.  Note that the PEC is filled out incorrectly and demographics needs to be completed and back signed by a treating physician (the [facility] sticker does not count for demographics on state PEC form).  Utilize olanzapine 10 mg IM every 8 hours PRN acute psychotic or non-redirectable agitation, first dose now.  Hopefully as he medically improves and a few doses of mood stabilizing anti-psychotic, he will return to somewhat of a functional baseline.

## 2022-09-19 NOTE — NURSING
Patient refused for CBG retake after eating. Then requested second tray on unit  consume 100% diet

## 2022-09-19 NOTE — SUBJECTIVE & OBJECTIVE
Patient History               Medical as of 9/19/2022       Past Medical History       Diagnosis Date Comments Source    Acute on chronic pancreatitis 04/03/2016 -- Provider    Addiction to drug -- -- Provider    CHF (congestive heart failure) -- -- Provider    DKA (diabetic ketoacidoses) 02/2020 -- Provider    DM (diabetes mellitus), type 1 -- -- Provider    HCV (hepatitis C virus) -- high VL  Provider    HTN (hypertension) -- -- Provider    Hx of psychiatric care -- -- Provider    Hypomagnesemia 05/25/2020 -- Provider    IVDU (intravenous drug user) -- Heroin Provider    Pancreatitis -- -- Provider    Psychiatric problem -- -- Provider    Substance abuse -- -- Provider              Pertinent Negatives       Diagnosis Date Noted Comments Source    History of psychiatric hospitalization 09/19/2022 -- Provider    Therapy 09/19/2022 -- Provider                          Surgical as of 9/19/2022       Past Surgical History       Procedure Laterality Date Comments Source    SHOULDER SURGERY -- 2013 right shoulder, spur removal. Provider    CARDIAC SURGERY -- 1999 stent placed. ([facility]) Provider    ESOPHAGOGASTRODUODENOSCOPY N/A 3/5/2020 Gastritis.  No H pylori.  Completed PPI for 1 month.  Procedure: EGD (ESOPHAGOGASTRODUODENOSCOPY);  Surgeon: Brinda Rene MD;  Location: [facility] ENDO;  Service: Endoscopy;  Laterality: N/A;  W421 A; [id number] Provider    LEFT HEART CATHETERIZATION Left 9/28/2020 Procedure: Left heart cath;  Surgeon: Fito Rangel MD;  Location: [facility] CATH LAB;  Service: Cardiology;  Laterality: Left; Provider                          Family as of 9/19/2022       Problem Relation Name Age of Onset Comments Source    Asthma Mother -- -- -- Provider                  Tobacco Use as of 9/19/2022       Smoking Status Smoking Start Date Last Attempt to Quit Smoking Frequency    Every Day 4/3/1981 6/1/2020 1 pack/day for 20.00 years (20.00 pk-yrs)      Smokeless Status Smokeless Type Smokeless  Quit Date    Never -- --      Tobacco Comments    states approx 1 per day.      Source    Provider                  Alcohol Use as of 9/19/2022       Alcohol Use Drinks/Week Alcohol/Week Comments Source    Not Currently 0 Standard drinks or equivalent 0.0 standard drinks -- Provider                  Drug Use as of 9/19/2022       Drug Use Types Frequency Comments Source    Yes  Heroin, IV, Marijuana, Amphetamines -- -- Provider                  Sexual Activity as of 9/19/2022       Sexually Active Birth Control Partners Comments Source    Yes -- Female -- Provider                  Activities of Daily Living as of 9/19/2022    None               Social Documentation as of 9/19/2022    None               Occupational as of 9/19/2022    None               Socioeconomic as of 9/19/2022       Marital Status Spouse Name Number of Children Years Education Education Level Preferred Language Ethnicity Race Source    Single -- -- -- -- English Not  or /a White --                  Pertinent History       Question Response Comments    Lives with family mother    Place in Birth Order -- --    Lives in home --    Number of Siblings -- --    Raised by -- --    Legal Involvement -- --    Childhood Trauma -- --    Criminal History of -- --    Financial Status unemployed --    Highest Level of Education -- --    Does patient have access to a firearm? -- --     Service -- --    Primary Leisure Activity -- --    Spirituality -- --          Past Medical History:   Diagnosis Date    Acute on chronic pancreatitis 04/03/2016    Addiction to drug     CHF (congestive heart failure)     DKA (diabetic ketoacidoses) 02/2020    DM (diabetes mellitus), type 1     HCV (hepatitis C virus)     high VL     HTN (hypertension)     Hx of psychiatric care     Hypomagnesemia 05/25/2020    IVDU (intravenous drug user)     Heroin    Pancreatitis     Psychiatric problem     Substance abuse      Past Surgical History:   Procedure  Laterality Date    CARDIAC SURGERY      stent placed. ([facility])    ESOPHAGOGASTRODUODENOSCOPY N/A 3/5/2020    Gastritis.  No H pylori.  Completed PPI for 1 month.  Procedure: EGD (ESOPHAGOGASTRODUODENOSCOPY);  Surgeon: Brinda Rene MD;  Location: [facility] ENDO;  Service: Endoscopy;  Laterality: N/A;  W421 A; [phone]    LEFT HEART CATHETERIZATION Left 2020    Procedure: Left heart cath;  Surgeon: Fito Rangel MD;  Location: [facility] CATH LAB;  Service: Cardiology;  Laterality: Left;    SHOULDER SURGERY      right shoulder, spur removal.     Family History       Problem Relation (Age of Onset)    Asthma Mother          Tobacco Use    Smoking status: Every Day     Packs/day: 1.00     Years: 20.00     Pack years: 20.00     Types: Cigarettes     Start date: 4/3/1981     Last attempt to quit: 2020     Years since quittin.3    Smokeless tobacco: Never    Tobacco comments:     states approx 1 per day.   Substance and Sexual Activity    Alcohol use: Not Currently     Alcohol/week: 0.0 standard drinks    Drug use: Yes     Types: Heroin, IV, Marijuana, Amphetamines    Sexual activity: Yes     Partners: Female     Review of patient's allergies indicates:  No Known Allergies    No current facility-administered medications on file prior to encounter.     Current Outpatient Medications on File Prior to Encounter   Medication Sig    albuterol (PROVENTIL/VENTOLIN HFA) 90 mcg/actuation inhaler Inhale 2 puffs into the lungs every 4 (four) hours as needed for Wheezing or Shortness of Breath. Rescue    aspirin 81 MG Chew Take 1 tablet (81 mg total) by mouth once daily.    atorvastatin (LIPITOR) 20 MG tablet Take 20 mg by mouth once daily.    b complex vitamins tablet Take 1 tablet by mouth once daily.    bisacodyL (DULCOLAX) 10 mg Supp Place 10 mg rectally daily as needed.    blood sugar diagnostic Strp Use To check blood glucose three times daily,    blood-glucose meter Misc To check Blood glucose three  "times daily,    calcium carbonate (TUMS) 200 mg calcium (500 mg) chewable tablet Take 1 tablet (500 mg total) by mouth daily as needed for Heartburn.    clopidogreL (PLAVIX) 75 mg tablet Take 1 tablet (75 mg total) by mouth once daily. (Patient not taking: Reported on 9/2/2022)    diphenhydrAMINE (BENADRYL) 25 mg capsule Take 25 mg by mouth every 6 (six) hours as needed for Itching.    DULoxetine (CYMBALTA) 60 MG capsule Take 1 capsule (60 mg total) by mouth once daily. (Patient not taking: Reported on 9/2/2022)    gabapentin (NEURONTIN) 300 MG capsule Take 2 capsules (600 mg total) by mouth 3 (three) times daily. (Patient not taking: Reported on 9/16/2022)    glucagon (GLUCAGON, HUMAN RECOMBINANT,) 1 mg SolR Inject 1 mg into the muscle as needed.    glucose 4 GM chewable tablet Take 16 g by mouth as needed for Low blood sugar.    HALOPERIDOL LACTATE INJ Inject 1 mg as directed every 4 (four) hours as needed.    insulin (LANTUS SOLOSTAR U-100 INSULIN) glargine 100 units/mL (3mL) SubQ pen Inject 42 Units into the skin every evening.    insulin lispro 100 unit/mL pen Inject 10 Units into the skin 3 (three) times daily with meals.    insulin regular (HUMULIN R REGULAR U-100 INSULN) 100 unit/mL Inj injection Inject 6 Units into the skin 3 (three) times daily before meals.    insulin syringe-needle U-100 0.5 mL 31 gauge x 5/16" Syrg 1 each by Misc.(Non-Drug; Combo Route) route 3 (three) times daily.    ketorolac (TORADOL) 10 mg tablet Take 1 tablet (10 mg total) by mouth every 6 (six) hours as needed. (Patient not taking: Reported on 9/16/2022)    L. acidophilus/Strept/La p-whit[place] (RISAQUAD ORAL) Take 1 capsule by mouth once daily.    lancets Misc To check BG 3 times daily, to use with insurance preferred meter    LIDOcaine (LIDODERM) 5 % Place 1 patch onto the skin once daily. Remove & Discard patch within 12 hours or as directed by MD (Patient not taking: Reported on 9/16/2022)    melatonin (MELATIN) 3 mg tablet Take 2 " "tablets (6 mg total) by mouth nightly as needed for Insomnia. (Patient not taking: Reported on 9/16/2022)    mineral oil (FLEET OIL RETENTION) enema Place 1 enema rectally daily as needed for Constipation.    naloxone (NARCAN) 4 mg/actuation Spry 4mg by nasal route as needed for opioid overdose; may repeat every 2-3 minutes in alternating nostrils until medical help arrives. Call 911    naloxone (NARCAN) 4 mg/actuation Spry 4mg by nasal route as needed for opioid overdose; may repeat every 2-3 minutes in alternating nostrils until medical help arrives. Call 911    nitroGLYCERIN (NITROSTAT) 0.4 MG SL tablet Place 1 tablet (0.4 mg total) under the tongue every 5 (five) minutes as needed for Chest pain. (Patient not taking: Reported on 9/16/2022)    pen needle, diabetic (CLICKFINE PEN NEEDLE) 32 gauge x 5/32" Ndle 1 each by Misc.(Non-Drug; Combo Route) route 4 (four) times daily before meals and nightly.    QUEtiapine (SEROQUEL) 50 MG tablet Take 50 mg by mouth every evening.    traZODone (DESYREL) 50 MG tablet Take 25 mg by mouth every evening.    [DISCONTINUED] insulin aspart U-100 (NOVOLOG) 100 unit/mL injection Inject 1-10 Units into the skin 3 (three) times daily with meals.     Psychotherapeutics (From admission, onward)      Start     Stop Route Frequency Ordered    09/19/22 1457  OLANZapine injection 10 mg         -- IM Every 8 hours PRN 09/19/22 1358    09/18/22 2134  haloperidol lactate injection 2 mg         -- IV Every 6 hours PRN 09/18/22 2034 09/17/22 2226  LORazepam injection 2 mg         -- IV Every 4 hours PRN 09/17/22 2126 09/17/22 0315  QUEtiapine tablet 50 mg         -- Oral Nightly 09/17/22 0216 09/17/22 0315  trazodone split tablet 25 mg         -- Oral Nightly 09/17/22 0216          Review of Systems   Unable to perform ROS: Psychiatric disorder   Strengths and Liabilities: Liability: Patient is defensive., Liability: Patient is dependent., Liability: Patient has poor health., Liability: " "Patient has poor judgment, Liability: Patient lacks coping skills.    Objective:     Vital Signs (Most Recent):  Temp: 97.6 °F (36.4 °C) (09/19/22 1151)  Pulse: 102 (09/19/22 1151)  Resp: 14 (09/19/22 1151)  BP: 125/76 (09/19/22 1151)  SpO2: 98 % (09/19/22 1151) Vital Signs (24h Range):  Temp:  [97.5 °F (36.4 °C)-97.8 °F (36.6 °C)] 97.6 °F (36.4 °C)  Pulse:  [] 102  Resp:  [14-33] 14  SpO2:  [98 %-100 %] 98 %  BP: (125-148)/(74-80) 125/76     Height: 5' 8" (172.7 cm)  Weight: 51.7 kg (113 lb 15.7 oz)  Body mass index is 17.33 kg/m².      Intake/Output Summary (Last 24 hours) at 9/19/2022 1418  Last data filed at 9/19/2022 1215  Gross per 24 hour   Intake 480 ml   Output 1125 ml   Net -645 ml       Physical Exam  Psychiatric:      Comments: EXAMINATION    CONSTITUTIONAL  General Appearance: hospital attire; restrained to bed    MUSCULOSKELETAL  Muscle Strength and Tone: normal  Abnormal Involuntary Movements: none noted  Gait and Station: not observed    PSYCHIATRIC MENTAL STATUS EXAM   Level of Consciousness: awake and alert  Orientation: will not answer  Grooming: poor; disheveled  Psychomotor Behavior: restless and fidgety  Speech: pressured and loud  Language: no abnormalities  Mood: will not answer  Affect: irritable; labile  Thought Process: can be linear at times  Associations: loose at times  Thought Content: will not answer; does not appear to be responding to stimuli  Memory: will not answer  Attention: distracted  Fund of Knowledge: cannot assess  Insight: poor into current medical or mental or drug use problems  Judgment: poor into redirection or cooperation with care            Significant Labs: All pertinent labs within the past 24 hours have been reviewed.    Significant Imaging: I have reviewed all pertinent imaging results/findings within the past 24 hours.  "

## 2022-09-19 NOTE — ASSESSMENT & PLAN NOTE
Suspected DKA due to insulin non-compliance. Gap resolved.     Most recent fingerstick glucose reviewed-   Recent Labs   Lab 09/18/22 2021 09/18/22  2107 09/19/22  0706 09/19/22  0746   POCTGLUCOSE >500* >500* 458* 392*     Current correctional scale  High  Increase anti-hyperglycemic dose as follows-   Antihyperglycemics (From admission, onward)    Start     Stop Route Frequency Ordered    09/20/22 0900  insulin detemir U-100 pen 30 Units         -- SubQ Daily 09/19/22 0903    09/18/22 0715  insulin aspart U-100 pen 5 Units         -- SubQ 3 times daily with meals 09/18/22 0535    09/18/22 0635  insulin aspart U-100 pen 1-10 Units         -- SubQ Before meals & nightly PRN 09/18/22 0535        Resolved

## 2022-09-20 VITALS
HEIGHT: 68 IN | SYSTOLIC BLOOD PRESSURE: 126 MMHG | BODY MASS INDEX: 17.28 KG/M2 | TEMPERATURE: 97 F | OXYGEN SATURATION: 95 % | RESPIRATION RATE: 18 BRPM | HEART RATE: 87 BPM | WEIGHT: 114 LBS | DIASTOLIC BLOOD PRESSURE: 67 MMHG

## 2022-09-20 LAB
POCT GLUCOSE: 118 MG/DL (ref 70–110)
POCT GLUCOSE: 227 MG/DL (ref 70–110)
POCT GLUCOSE: 367 MG/DL (ref 70–110)

## 2022-09-20 PROCEDURE — 25000003 PHARM REV CODE 250: Performed by: HOSPITALIST

## 2022-09-20 PROCEDURE — 25000003 PHARM REV CODE 250: Performed by: INTERNAL MEDICINE

## 2022-09-20 PROCEDURE — 25000003 PHARM REV CODE 250: Performed by: STUDENT IN AN ORGANIZED HEALTH CARE EDUCATION/TRAINING PROGRAM

## 2022-09-20 PROCEDURE — 99233 SBSQ HOSP IP/OBS HIGH 50: CPT | Mod: ,,, | Performed by: PSYCHIATRY & NEUROLOGY

## 2022-09-20 PROCEDURE — 63600175 PHARM REV CODE 636 W HCPCS: Performed by: INTERNAL MEDICINE

## 2022-09-20 PROCEDURE — 99233 PR SUBSEQUENT HOSPITAL CARE,LEVL III: ICD-10-PCS | Mod: ,,, | Performed by: PSYCHIATRY & NEUROLOGY

## 2022-09-20 RX ORDER — ATORVASTATIN CALCIUM 20 MG/1
20 TABLET, FILM COATED ORAL DAILY
Qty: 90 TABLET | Refills: 3 | Status: SHIPPED | OUTPATIENT
Start: 2022-09-20 | End: 2024-03-10

## 2022-09-20 RX ORDER — NAPROXEN SODIUM 220 MG/1
81 TABLET, FILM COATED ORAL DAILY
Qty: 90 TABLET | Refills: 3 | Status: SHIPPED | OUTPATIENT
Start: 2022-09-20 | End: 2024-03-10

## 2022-09-20 RX ORDER — INSULIN LISPRO 100 [IU]/ML
7 INJECTION, SOLUTION INTRAVENOUS; SUBCUTANEOUS
Status: ON HOLD | COMMUNITY
Start: 2022-07-23 | End: 2022-09-20 | Stop reason: SDUPTHER

## 2022-09-20 RX ORDER — INSULIN DETEMIR 100 [IU]/ML
40 INJECTION, SOLUTION SUBCUTANEOUS NIGHTLY
Status: ON HOLD | COMMUNITY
Start: 2022-07-19 | End: 2022-09-20 | Stop reason: SDUPTHER

## 2022-09-20 RX ORDER — INSULIN LISPRO 100 [IU]/ML
7 INJECTION, SOLUTION INTRAVENOUS; SUBCUTANEOUS
Qty: 15 ML | Refills: 2 | Status: SHIPPED | OUTPATIENT
Start: 2022-09-20 | End: 2022-12-12 | Stop reason: SDUPTHER

## 2022-09-20 RX ORDER — OLANZAPINE 10 MG/2ML
10 INJECTION, POWDER, FOR SOLUTION INTRAMUSCULAR 2 TIMES DAILY PRN
Status: DISCONTINUED | OUTPATIENT
Start: 2022-09-20 | End: 2022-09-20 | Stop reason: HOSPADM

## 2022-09-20 RX ORDER — GABAPENTIN 100 MG/1
100 CAPSULE ORAL 3 TIMES DAILY
COMMUNITY
Start: 2022-06-19 | End: 2022-12-12 | Stop reason: SDUPTHER

## 2022-09-20 RX ADMIN — POLYETHYLENE GLYCOL 3350 17 G: 17 POWDER, FOR SOLUTION ORAL at 08:09

## 2022-09-20 RX ADMIN — ATORVASTATIN CALCIUM 20 MG: 10 TABLET, FILM COATED ORAL at 08:09

## 2022-09-20 RX ADMIN — INSULIN ASPART 10 UNITS: 100 INJECTION, SOLUTION INTRAVENOUS; SUBCUTANEOUS at 08:09

## 2022-09-20 RX ADMIN — ASPIRIN 81 MG CHEWABLE TABLET 81 MG: 81 TABLET CHEWABLE at 08:09

## 2022-09-20 RX ADMIN — GUAIFENESIN 200 MG: 200 SOLUTION ORAL at 08:09

## 2022-09-20 RX ADMIN — HALOPERIDOL LACTATE 2 MG: 5 INJECTION, SOLUTION INTRAMUSCULAR at 01:09

## 2022-09-20 RX ADMIN — GABAPENTIN 300 MG: 300 CAPSULE ORAL at 09:09

## 2022-09-20 RX ADMIN — AMLODIPINE BESYLATE 10 MG: 5 TABLET ORAL at 08:09

## 2022-09-20 RX ADMIN — MUPIROCIN: 20 OINTMENT TOPICAL at 08:09

## 2022-09-20 RX ADMIN — INSULIN ASPART 5 UNITS: 100 INJECTION, SOLUTION INTRAVENOUS; SUBCUTANEOUS at 11:09

## 2022-09-20 RX ADMIN — ACETAMINOPHEN 650 MG: 325 TABLET ORAL at 08:09

## 2022-09-20 RX ADMIN — INSULIN ASPART 5 UNITS: 100 INJECTION, SOLUTION INTRAVENOUS; SUBCUTANEOUS at 08:09

## 2022-09-20 RX ADMIN — CLOPIDOGREL 75 MG: 75 TABLET, FILM COATED ORAL at 08:09

## 2022-09-20 NOTE — HOSPITAL COURSE
"09/20/2022 - Patient seen in his bed today.  He is out of restraints and somewhat calmer, asking to leave.  Not as loud and disruptive.  States that he wants to leave so that he can care for his niece and nephew.  States that he feels good and doesn't have any problems.  When asked about his drug use, he says that he "gave that shit up".  Nursing reports that he has been a little more cooperative today but still needing some redirection.  "

## 2022-09-20 NOTE — SUBJECTIVE & OBJECTIVE
"Interval History: slightly improved    Family History       Problem Relation (Age of Onset)    Asthma Mother          Tobacco Use    Smoking status: Every Day     Packs/day: 1.00     Years: 20.00     Pack years: 20.00     Types: Cigarettes     Start date: 4/3/1981     Last attempt to quit: 2020     Years since quittin.3    Smokeless tobacco: Never    Tobacco comments:     states approx 1 per day.   Substance and Sexual Activity    Alcohol use: Not Currently     Alcohol/week: 0.0 standard drinks    Drug use: Yes     Types: Heroin, IV, Marijuana, Amphetamines    Sexual activity: Yes     Partners: Female     Psychotherapeutics (From admission, onward)      Start     Stop Route Frequency Ordered    22 0845  OLANZapine injection 10 mg         -- IM 2 times daily PRN 22 0740    22  QUEtiapine tablet 50 mg         -- Oral Nightly 22 02122  trazodone split tablet 25 mg         -- Oral Nightly 22 021             Review of Systems   Constitutional:  Negative for activity change and fatigue.   Respiratory:  Negative for shortness of breath.    Cardiovascular:  Negative for chest pain.   Gastrointestinal:  Negative for nausea.   Musculoskeletal:  Positive for myalgias.   Psychiatric/Behavioral:  Negative for dysphoric mood, sleep disturbance and suicidal ideas. The patient is nervous/anxious.    Objective:     Vital Signs (Most Recent):  Temp: 97 °F (36.1 °C) (22 0300)  Pulse: 87 (22 0300)  Resp: 18 (22 0300)  BP: 126/67 (22 0300)  SpO2: 95 % (22 0300) Vital Signs (24h Range):  Temp:  [97 °F (36.1 °C)-98.1 °F (36.7 °C)] 97 °F (36.1 °C)  Pulse:  [] 87  Resp:  [14-18] 18  SpO2:  [94 %-98 %] 95 %  BP: (125-130)/(59-76) 126/67     Height: 5' 8" (172.7 cm)  Weight: 51.7 kg (113 lb 15.7 oz)  Body mass index is 17.33 kg/m².      Intake/Output Summary (Last 24 hours) at 2022 1133  Last data filed at 2022 0818  Gross per 24 hour " "  Intake 480 ml   Output 200 ml   Net 280 ml       Physical Exam  Psychiatric:      Comments: EXAMINATION    CONSTITUTIONAL  General Appearance: hospital attire; restrained to bed    MUSCULOSKELETAL  Muscle Strength and Tone: normal  Abnormal Involuntary Movements: none noted  Gait and Station: not observed    PSYCHIATRIC MENTAL STATUS EXAM   Level of Consciousness: awake and alert  Orientation: name, hospital, month/year  Grooming: poor; disheveled  Psychomotor Behavior: calm  Speech: not pressured or loud  Language: no abnormalities  Mood: "good"  Affect: irritable  Thought Process: can be linear at times  Associations: loose at times  Thought Content: will not answer; does not appear to be responding to stimuli; perseverative on going home  Memory: will not answer  Attention: not as distracted  Fund of Knowledge: simple to conversation  Insight: poor into current medical or mental or drug use problems  Judgment: limited but improved into redirection or cooperation with care            Significant Labs: All pertinent labs within the past 24 hours have been reviewed.    Significant Imaging: I have reviewed all pertinent imaging results/findings within the past 24 hours.  "

## 2022-09-20 NOTE — DISCHARGE SUMMARY
"[facility] Medicine  Discharge Summary      Patient Name: Cali Mabry  MRN: [id number]  Patient Class: IP- Inpatient  Admission Date: 9/16/2022  Hospital Length of Stay: 3 days  Discharge Date and Time:  09/20/2022 11:02 AM  Attending Physician: Karissa Lugo MD   Discharging Provider: Karissa Lugo MD  Primary Care Provider: Primary Doctor No      HPI:   Mr. Mabry is a 56yo man with a past medical history of HTN, IV heroin abuse, DM1 with DKA, Hep C, pancreatitis, and COPD due to long term cigarette smoking.  He is known to me from admitting him on 8/7/22 for COPD exacerbation to .    I examined and interviewed him in the ED.  He is very lethargic, and will only answer simple questions, sometimes refusing altogether.  He states, "I feel like death."  When asked how exactly, he responds, "I don't know, just not good."  I was able to gather that he hurts all over, had some N/V for several days and vague abdominal discomfort.  Of note, he stopped taking all of his insulin over a week ago (he would not tell me why).  I did ask if he was still using IV heroin, but he said, "not for a year now."    In the ED his VSs were BP (!) 185/88   Pulse (!) 111   Temp 98.7 °F (37.1 °C) (Oral)   Resp (!) 22   Ht 5' 8" (1.727 m)   Wt 65.8 kg (145 lb)   SpO2 100%   BMI 22.05 kg/m².  His labs showed a normal CBC, Na 131, K 5.7, AG 32, BUN 27, Cr 1.9 (baseline ) glucose 736, and normal LFT.  Trop 0.006, LA 2.8, BHB 6.3.  COVID/FLU NEG, ETOH NEG, VBG pH 7.059, PCO2 40.    CXR showed no acute abnormalities.  EKG showed sinus tachycardia with occasional Premature ventricular complexes, rate 111, right atrial enlargement, and possible Anterior infarct ,age undetermined.    In the ED he was treated with Insulin %U IV 2344, Zofran 4mg iv 2339, Zosyn 4.5g iv 0201, NS 1L bolus 0111 & 2340.                     * No surgery found *      Hospital Course:   Mr. Mabry was admitted to ICU with DKA. " Started on DKA pathway. Pt became agitated overnight and was PEC'd. DKA resolved and transitioned to basal bolus insulin regimen. Evaluated by Psychiatry. His behaviors have slowly improved. PEC rescinded. Stable for discharge to home. Follow up with PCP.        Goals of Care Treatment Preferences:  Code Status: Full Code      Consults:   Consults (From admission, onward)        Status Ordering Provider     Inpatient consult to Psychiatry  Once        Provider:  Mahamed Menendez MD    Completed PAWEL LERNER     Inpatient consult to Registered Dietitian/Nutritionist  Once        Provider:  (Not yet assigned)    Completed PAWEL LERNER          No new Assessment & Plan notes have been filed under this hospital service since the last note was generated.  Service: Hospital Medicine    Final Active Diagnoses:    Diagnosis Date Noted POA    PRINCIPAL PROBLEM:  Diabetic ketoacidosis without coma associated with type 1 diabetes mellitus [E10.10] 11/24/2021 Yes    Polysubstance dependence including opioid type drug, continuous use [F11.20, F19.20] 09/19/2022 Yes    Substance induced mood disorder [F19.94] 09/19/2022 Yes    Encounter for assessment of decision-making capacity [Z01.89] 09/18/2022 Not Applicable    Noncompliance with medication regimen [Z91.14] 09/17/2022 Not Applicable    RAMYA (acute kidney injury) [N17.9] 03/07/2022 Yes    COPD (chronic obstructive pulmonary disease) [J44.9] 08/08/2020 Yes    History of heroin abuse [F11.11] 04/23/2020 Yes    Hyperkalemia [E87.5] 04/13/2019 Yes    Chronic hepatitis C [B18.2] 11/23/2017 Yes     Chronic    Tobacco use disorder, moderate, dependence [F17.200] 11/23/2017 Yes     Chronic    Chronic relapsing pancreatitis [K86.1] 11/23/2017 Yes     Chronic    Essential hypertension [I10] 04/03/2016 Yes     Chronic      Problems Resolved During this Admission:    Diagnosis Date Noted Date Resolved POA    DKA (diabetic ketoacidoses) [E11.10] 04/13/2019  09/17/2022 Unknown       Discharged Condition: good    Disposition: Home or Self Care    Follow Up: with PCP    Patient Instructions:      Diet diabetic     Notify your health care provider if you experience any of the following:  temperature >100.4     Notify your health care provider if you experience any of the following:  persistent nausea and vomiting or diarrhea     Notify your health care provider if you experience any of the following:  severe uncontrolled pain     Notify your health care provider if you experience any of the following:  redness, tenderness, or signs of infection (pain, swelling, redness, odor or green/yellow discharge around incision site)     Notify your health care provider if you experience any of the following:  difficulty breathing or increased cough     Notify your health care provider if you experience any of the following:  severe persistent headache     Notify your health care provider if you experience any of the following:  worsening rash     Notify your health care provider if you experience any of the following:  persistent dizziness, light-headedness, or visual disturbances     Notify your health care provider if you experience any of the following:  increased confusion or weakness     Activity as tolerated       Significant Diagnostic Studies: Labs: All labs within the past 24 hours have been reviewed    Pending Diagnostic Studies:     None         Medications:  Reconciled Home Medications:      Medication List      CHANGE how you take these medications    gabapentin 100 MG capsule  Commonly known as: NEURONTIN  Take 100 mg by mouth 3 (three) times daily.  What changed: Another medication with the same name was removed. Continue taking this medication, and follow the directions you see here.     HumaLOG U-100 Insulin 100 unit/mL injection  Generic drug: insulin lispro  Inject 7 Units into the skin 3 (three) times daily with meals.  What changed: Another medication with the  "same name was removed. Continue taking this medication, and follow the directions you see here.     naloxone 4 mg/actuation Spry  Commonly known as: NARCAN  4mg by nasal route as needed for opioid overdose; may repeat every 2-3 minutes in alternating nostrils until medical help arrives. Call 911  What changed: Another medication with the same name was removed. Continue taking this medication, and follow the directions you see here.        CONTINUE taking these medications    aspirin 81 MG Chew  Take 1 tablet (81 mg total) by mouth once daily.     atorvastatin 20 MG tablet  Commonly known as: LIPITOR  Take 1 tablet (20 mg total) by mouth once daily.     BD ULTRA-FINE GERTRUDIS PEN NEEDLE 32 gauge x 5/32" Ndle  Generic drug: pen needle, diabetic  1 each by Misc.(Non-Drug; Combo Route) route 4 (four) times daily before meals and nightly.     blood sugar diagnostic Strp  Use To check blood glucose three times daily,     insulin syringe-needle U-100 0.5 mL 31 gauge x 5/16" Syrg  1 each by Misc.(Non-Drug; Combo Route) route 3 (three) times daily.     lancets [facility]  To check BG 3 times daily, to use with insurance preferred meter     LEVEMIR U-100 INSULIN 100 unit/mL injection  Generic drug: insulin detemir U-100  Inject 40 Units into the skin every evening.     nitroGLYCERIN 0.4 MG SL tablet  Commonly known as: NITROSTAT  Place 1 tablet (0.4 mg total) under the tongue every 5 (five) minutes as needed for Chest pain.        STOP taking these medications    albuterol 90 mcg/actuation inhaler  Commonly known as: PROVENTIL/VENTOLIN HFA     b complex vitamins tablet     bisacodyL 10 mg Supp  Commonly known as: DULCOLAX     blood-glucose meter Misc     calcium carbonate 200 mg calcium (500 mg) chewable tablet  Commonly known as: TUMS     clopidogreL 75 mg tablet  Commonly known as: PLAVIX     diphenhydrAMINE 25 mg capsule  Commonly known as: BENADRYL     DULoxetine 60 MG capsule  Commonly known as: CYMBALTA     GLUCAGON (HUMAN " RECOMBINANT) 1 mg Solr  Generic drug: glucagon     glucose 4 GM chewable tablet     HALOPERIDOL LACTATE INJ     insulin aspart U-100 100 unit/mL injection  Commonly known as: NovoLOG     insulin regular 100 unit/mL injection  Commonly known as: HumuLIN R Regular U-100 Insuln     ketorolac 10 mg tablet  Commonly known as: TORADOL     LANTUS SOLOSTAR U-100 INSULIN glargine 100 units/mL SubQ pen  Generic drug: insulin     LIDOcaine 5 %  Commonly known as: LIDODERM     melatonin 3 mg tablet  Commonly known as: MELATIN     mineral oil enema  Commonly known as: FLEET OIL RETENTION     QUEtiapine 50 MG tablet  Commonly known as: SEROQUEL     RISAQUAD ORAL     traZODone 50 MG tablet  Commonly known as: DESYREL            Indwelling Lines/Drains at time of discharge: none    Time spent on the discharge of patient: 35 minutes         Karissa Lugo MD  Department of Hospital Medicine  [facility] Surg

## 2022-09-20 NOTE — NURSING
Notified  doctor that  and iv fluid are D5 1/2 Nacl @ 125 cc hr.discontinue orders given and stopped

## 2022-09-20 NOTE — PLAN OF CARE
[place] - Med Surg  Discharge Final Note    Patient clear to discharge from case management stand point. Instructed patient to schedule hospital follow up with [facility] within one week, verbalized understanding, listed on avs. Pt stated his mother will be his help at home.     Primary Care Provider: Primary Doctor No    Expected Discharge Date: 9/20/2022    Final Discharge Note (most recent)       Final Note - 09/20/22 1156          Final Note    Assessment Type Final Discharge Note     Anticipated Discharge Disposition Home or Self Care     What phone number can be called within the next 1-3 days to see how you are doing after discharge? [phone]     Hospital Resources/Appts/Education Provided Provided patient/caregiver with written discharge plan information        Post-Acute Status    Discharge Delays None known at this time                     Important Message from Medicare             Contact Info       [facility]    [address] [place] [address]   Phone: [phone]       Next Steps: Schedule an appointment as soon as possible for a visit in 1 week(s)    Instructions: Call to schedule hospital follow up
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
## 2022-09-21 ENCOUNTER — PATIENT OUTREACH (OUTPATIENT)
Dept: ADMINISTRATIVE | Facility: CLINIC | Age: 57
End: 2022-09-21
Payer: MEDICARE

## 2022-09-21 ENCOUNTER — PATIENT MESSAGE (OUTPATIENT)
Dept: ADMINISTRATIVE | Facility: CLINIC | Age: 57
End: 2022-09-21
Payer: MEDICARE

## 2022-09-21 LAB
BACTERIA BLD CULT: NORMAL
BACTERIA BLD CULT: NORMAL

## 2022-09-21 NOTE — PROGRESS NOTES
C3 nurse attempted to contact Cali Mabry for a TCC post hospital discharge follow up call. No answer. No voicemail available. Message sent via [facility] portal for Post Discharge Attempt with callback information ONLY. The patient does not have a scheduled HOSFU appointment, and the [facility].

## 2022-09-21 NOTE — PROGRESS NOTES
Second attempt  C3 nurse attempted to contact Cali Mabry for a TCC post hospital discharge follow up call. No answer. No voicemail available.  The patient does not have a scheduled HOSFU appointment, and the pt does not have an [facility] PCP.

## 2022-09-23 ENCOUNTER — HOSPITAL ENCOUNTER (INPATIENT)
Facility: HOSPITAL | Age: 57
LOS: 5 days | Discharge: PSYCHIATRIC OR SUBSTANCE USE FACILITY | DRG: 638 | End: 2022-09-28
Attending: STUDENT IN AN ORGANIZED HEALTH CARE EDUCATION/TRAINING PROGRAM | Admitting: STUDENT IN AN ORGANIZED HEALTH CARE EDUCATION/TRAINING PROGRAM
Payer: MEDICARE

## 2022-09-23 DIAGNOSIS — R73.9 HYPERGLYCEMIA: ICD-10-CM

## 2022-09-23 DIAGNOSIS — E11.10 DIABETIC KETOACIDOSIS WITHOUT COMA ASSOCIATED WITH TYPE 2 DIABETES MELLITUS: Primary | ICD-10-CM

## 2022-09-23 LAB
ALBUMIN SERPL BCP-MCNC: 3.9 G/DL (ref 3.5–5.2)
ALLENS TEST: ABNORMAL
ALP SERPL-CCNC: 113 U/L (ref 55–135)
ALT SERPL W/O P-5'-P-CCNC: 21 U/L (ref 10–44)
AMPHET+METHAMPHET UR QL: ABNORMAL
ANION GAP SERPL CALC-SCNC: 32 MMOL/L (ref 8–16)
ANION GAP SERPL CALC-SCNC: ABNORMAL MMOL/L (ref 8–16)
AST SERPL-CCNC: 16 U/L (ref 10–40)
B-OH-BUTYR BLD STRIP-SCNC: 1 MMOL/L (ref 0–0.5)
BACTERIA #/AREA URNS HPF: NORMAL /HPF
BARBITURATES UR QL SCN>200 NG/ML: NEGATIVE
BASOPHILS # BLD AUTO: 0.04 K/UL (ref 0–0.2)
BASOPHILS NFR BLD: 0.3 % (ref 0–1.9)
BENZODIAZ UR QL SCN>200 NG/ML: NEGATIVE
BILIRUB SERPL-MCNC: 0.3 MG/DL (ref 0.1–1)
BILIRUB UR QL STRIP: NEGATIVE
BUN SERPL-MCNC: 41 MG/DL (ref 6–20)
BUN SERPL-MCNC: 41 MG/DL (ref 6–20)
BZE UR QL SCN: NEGATIVE
CALCIUM SERPL-MCNC: 9.2 MG/DL (ref 8.7–10.5)
CALCIUM SERPL-MCNC: 9.4 MG/DL (ref 8.7–10.5)
CANNABINOIDS UR QL SCN: NEGATIVE
CHLORIDE SERPL-SCNC: 98 MMOL/L (ref 95–110)
CHLORIDE SERPL-SCNC: 99 MMOL/L (ref 95–110)
CLARITY UR: CLEAR
CO2 SERPL-SCNC: 6 MMOL/L (ref 23–29)
CO2 SERPL-SCNC: <5 MMOL/L (ref 23–29)
COLOR UR: COLORLESS
CREAT SERPL-MCNC: 1.9 MG/DL (ref 0.5–1.4)
CREAT SERPL-MCNC: 1.9 MG/DL (ref 0.5–1.4)
CREAT UR-MCNC: 20.2 MG/DL (ref 23–375)
CTP QC/QA: YES
DELSYS: ABNORMAL
DIFFERENTIAL METHOD: ABNORMAL
EOSINOPHIL # BLD AUTO: 0 K/UL (ref 0–0.5)
EOSINOPHIL NFR BLD: 0.1 % (ref 0–8)
ERYTHROCYTE [DISTWIDTH] IN BLOOD BY AUTOMATED COUNT: 14.1 % (ref 11.5–14.5)
EST. GFR  (NO RACE VARIABLE): 41 ML/MIN/1.73 M^2
EST. GFR  (NO RACE VARIABLE): 41 ML/MIN/1.73 M^2
GLUCOSE SERPL-MCNC: 642 MG/DL (ref 70–110)
GLUCOSE SERPL-MCNC: 645 MG/DL (ref 70–110)
GLUCOSE UR QL STRIP: ABNORMAL
HCO3 UR-SCNC: 8.3 MMOL/L (ref 24–28)
HCT VFR BLD AUTO: 48.3 % (ref 40–54)
HGB BLD-MCNC: 14.9 G/DL (ref 14–18)
HGB UR QL STRIP: ABNORMAL
HYALINE CASTS #/AREA URNS LPF: 0 /LPF
IMM GRANULOCYTES # BLD AUTO: 0.13 K/UL (ref 0–0.04)
IMM GRANULOCYTES NFR BLD AUTO: 1 % (ref 0–0.5)
KETONES UR QL STRIP: ABNORMAL
LEUKOCYTE ESTERASE UR QL STRIP: NEGATIVE
LYMPHOCYTES # BLD AUTO: 1.5 K/UL (ref 1–4.8)
LYMPHOCYTES NFR BLD: 11.3 % (ref 18–48)
MAGNESIUM SERPL-MCNC: 2.5 MG/DL (ref 1.6–2.6)
MCH RBC QN AUTO: 27.1 PG (ref 27–31)
MCHC RBC AUTO-ENTMCNC: 30.8 G/DL (ref 32–36)
MCV RBC AUTO: 88 FL (ref 82–98)
METHADONE UR QL SCN>300 NG/ML: NEGATIVE
MICROSCOPIC COMMENT: NORMAL
MODE: ABNORMAL
MONOCYTES # BLD AUTO: 1.5 K/UL (ref 0.3–1)
MONOCYTES NFR BLD: 11.8 % (ref 4–15)
NEUTROPHILS # BLD AUTO: 9.7 K/UL (ref 1.8–7.7)
NEUTROPHILS NFR BLD: 75.5 % (ref 38–73)
NITRITE UR QL STRIP: NEGATIVE
NRBC BLD-RTO: 0 /100 WBC
OPIATES UR QL SCN: NEGATIVE
PCO2 BLDA: 33.9 MMHG (ref 35–45)
PCP UR QL SCN>25 NG/ML: NEGATIVE
PH SMN: 7 [PH] (ref 7.35–7.45)
PH UR STRIP: 6 [PH] (ref 5–8)
PHOSPHATE SERPL-MCNC: 6.8 MG/DL (ref 2.7–4.5)
PLATELET # BLD AUTO: 465 K/UL (ref 150–450)
PMV BLD AUTO: 9.7 FL (ref 9.2–12.9)
PO2 BLDA: 30 MMHG (ref 40–60)
POC BE: -22 MMOL/L
POC SATURATED O2: 33 % (ref 95–100)
POC TCO2: 9 MMOL/L (ref 24–29)
POCT GLUCOSE: >500 MG/DL (ref 70–110)
POTASSIUM SERPL-SCNC: 5.8 MMOL/L (ref 3.5–5.1)
POTASSIUM SERPL-SCNC: 5.9 MMOL/L (ref 3.5–5.1)
PROT SERPL-MCNC: 8 G/DL (ref 6–8.4)
PROT UR QL STRIP: ABNORMAL
RBC # BLD AUTO: 5.5 M/UL (ref 4.6–6.2)
RBC #/AREA URNS HPF: 0 /HPF (ref 0–4)
SAMPLE: ABNORMAL
SARS-COV-2 RDRP RESP QL NAA+PROBE: NEGATIVE
SITE: ABNORMAL
SODIUM SERPL-SCNC: 136 MMOL/L (ref 136–145)
SODIUM SERPL-SCNC: 136 MMOL/L (ref 136–145)
SP GR UR STRIP: 1.02 (ref 1–1.03)
SQUAMOUS #/AREA URNS HPF: 1 /HPF
TOXICOLOGY INFORMATION: ABNORMAL
URN SPEC COLLECT METH UR: ABNORMAL
UROBILINOGEN UR STRIP-ACNC: NEGATIVE EU/DL
WBC # BLD AUTO: 12.9 K/UL (ref 3.9–12.7)
WBC #/AREA URNS HPF: 0 /HPF (ref 0–5)
YEAST URNS QL MICRO: NORMAL

## 2022-09-23 PROCEDURE — 80053 COMPREHEN METABOLIC PANEL: CPT | Performed by: STUDENT IN AN ORGANIZED HEALTH CARE EDUCATION/TRAINING PROGRAM

## 2022-09-23 PROCEDURE — 36415 COLL VENOUS BLD VENIPUNCTURE: CPT | Performed by: STUDENT IN AN ORGANIZED HEALTH CARE EDUCATION/TRAINING PROGRAM

## 2022-09-23 PROCEDURE — 82962 GLUCOSE BLOOD TEST: CPT | Mod: 59

## 2022-09-23 PROCEDURE — 96374 THER/PROPH/DIAG INJ IV PUSH: CPT

## 2022-09-23 PROCEDURE — 93010 EKG 12-LEAD: ICD-10-PCS | Mod: ,,, | Performed by: INTERNAL MEDICINE

## 2022-09-23 PROCEDURE — U0002 COVID-19 LAB TEST NON-CDC: HCPCS | Performed by: STUDENT IN AN ORGANIZED HEALTH CARE EDUCATION/TRAINING PROGRAM

## 2022-09-23 PROCEDURE — 93005 ELECTROCARDIOGRAM TRACING: CPT

## 2022-09-23 PROCEDURE — 82803 BLOOD GASES ANY COMBINATION: CPT

## 2022-09-23 PROCEDURE — 20000000 HC ICU ROOM

## 2022-09-23 PROCEDURE — 83930 ASSAY OF BLOOD OSMOLALITY: CPT | Performed by: STUDENT IN AN ORGANIZED HEALTH CARE EDUCATION/TRAINING PROGRAM

## 2022-09-23 PROCEDURE — 80048 BASIC METABOLIC PNL TOTAL CA: CPT | Mod: XB | Performed by: STUDENT IN AN ORGANIZED HEALTH CARE EDUCATION/TRAINING PROGRAM

## 2022-09-23 PROCEDURE — 83735 ASSAY OF MAGNESIUM: CPT | Performed by: STUDENT IN AN ORGANIZED HEALTH CARE EDUCATION/TRAINING PROGRAM

## 2022-09-23 PROCEDURE — 25000003 PHARM REV CODE 250: Performed by: STUDENT IN AN ORGANIZED HEALTH CARE EDUCATION/TRAINING PROGRAM

## 2022-09-23 PROCEDURE — 96375 TX/PRO/DX INJ NEW DRUG ADDON: CPT

## 2022-09-23 PROCEDURE — 85025 COMPLETE CBC W/AUTO DIFF WBC: CPT | Performed by: STUDENT IN AN ORGANIZED HEALTH CARE EDUCATION/TRAINING PROGRAM

## 2022-09-23 PROCEDURE — 99285 EMERGENCY DEPT VISIT HI MDM: CPT | Mod: 25

## 2022-09-23 PROCEDURE — 63600175 PHARM REV CODE 636 W HCPCS: Performed by: STUDENT IN AN ORGANIZED HEALTH CARE EDUCATION/TRAINING PROGRAM

## 2022-09-23 PROCEDURE — 81000 URINALYSIS NONAUTO W/SCOPE: CPT | Mod: 59 | Performed by: STUDENT IN AN ORGANIZED HEALTH CARE EDUCATION/TRAINING PROGRAM

## 2022-09-23 PROCEDURE — 93010 ELECTROCARDIOGRAM REPORT: CPT | Mod: ,,, | Performed by: INTERNAL MEDICINE

## 2022-09-23 PROCEDURE — 99900035 HC TECH TIME PER 15 MIN (STAT)

## 2022-09-23 PROCEDURE — 80307 DRUG TEST PRSMV CHEM ANLYZR: CPT | Performed by: STUDENT IN AN ORGANIZED HEALTH CARE EDUCATION/TRAINING PROGRAM

## 2022-09-23 PROCEDURE — 84100 ASSAY OF PHOSPHORUS: CPT | Performed by: STUDENT IN AN ORGANIZED HEALTH CARE EDUCATION/TRAINING PROGRAM

## 2022-09-23 PROCEDURE — 82010 KETONE BODYS QUAN: CPT | Performed by: STUDENT IN AN ORGANIZED HEALTH CARE EDUCATION/TRAINING PROGRAM

## 2022-09-23 PROCEDURE — 96361 HYDRATE IV INFUSION ADD-ON: CPT | Mod: 59

## 2022-09-23 RX ORDER — INDOMETHACIN 25 MG/1
50 CAPSULE ORAL
Status: COMPLETED | OUTPATIENT
Start: 2022-09-23 | End: 2022-09-23

## 2022-09-23 RX ORDER — HALOPERIDOL 5 MG/ML
2 INJECTION INTRAMUSCULAR EVERY 6 HOURS PRN
Status: DISCONTINUED | OUTPATIENT
Start: 2022-09-23 | End: 2022-09-28 | Stop reason: HOSPADM

## 2022-09-23 RX ORDER — HALOPERIDOL 5 MG/ML
2.5 INJECTION INTRAMUSCULAR
Status: COMPLETED | OUTPATIENT
Start: 2022-09-23 | End: 2022-09-23

## 2022-09-23 RX ORDER — ACETAMINOPHEN 325 MG/1
650 TABLET ORAL EVERY 4 HOURS PRN
Status: DISCONTINUED | OUTPATIENT
Start: 2022-09-23 | End: 2022-09-26

## 2022-09-23 RX ORDER — POLYETHYLENE GLYCOL 3350 17 G/17G
17 POWDER, FOR SOLUTION ORAL DAILY
Status: DISCONTINUED | OUTPATIENT
Start: 2022-09-24 | End: 2022-09-28 | Stop reason: HOSPADM

## 2022-09-23 RX ORDER — SODIUM CHLORIDE 0.9 % (FLUSH) 0.9 %
10 SYRINGE (ML) INJECTION
Status: DISCONTINUED | OUTPATIENT
Start: 2022-09-23 | End: 2022-09-28 | Stop reason: HOSPADM

## 2022-09-23 RX ORDER — FAMOTIDINE 10 MG/ML
20 INJECTION INTRAVENOUS
Status: DISCONTINUED | OUTPATIENT
Start: 2022-09-23 | End: 2022-09-23

## 2022-09-23 RX ORDER — TALC
6 POWDER (GRAM) TOPICAL NIGHTLY PRN
Status: DISCONTINUED | OUTPATIENT
Start: 2022-09-23 | End: 2022-09-28 | Stop reason: HOSPADM

## 2022-09-23 RX ORDER — POTASSIUM CHLORIDE 7.45 MG/ML
60 INJECTION INTRAVENOUS
Status: DISCONTINUED | OUTPATIENT
Start: 2022-09-23 | End: 2022-09-28 | Stop reason: HOSPADM

## 2022-09-23 RX ORDER — FAMOTIDINE 20 MG/1
20 TABLET, FILM COATED ORAL 2 TIMES DAILY
Status: DISCONTINUED | OUTPATIENT
Start: 2022-09-23 | End: 2022-09-28 | Stop reason: HOSPADM

## 2022-09-23 RX ORDER — DEXTROSE MONOHYDRATE AND SODIUM CHLORIDE 5; .45 G/100ML; G/100ML
125 INJECTION, SOLUTION INTRAVENOUS CONTINUOUS PRN
Status: DISCONTINUED | OUTPATIENT
Start: 2022-09-23 | End: 2022-09-24

## 2022-09-23 RX ORDER — IPRATROPIUM BROMIDE AND ALBUTEROL SULFATE 2.5; .5 MG/3ML; MG/3ML
3 SOLUTION RESPIRATORY (INHALATION) EVERY 6 HOURS PRN
Status: DISCONTINUED | OUTPATIENT
Start: 2022-09-23 | End: 2022-09-28 | Stop reason: HOSPADM

## 2022-09-23 RX ORDER — POTASSIUM CHLORIDE 7.45 MG/ML
40 INJECTION INTRAVENOUS
Status: DISCONTINUED | OUTPATIENT
Start: 2022-09-23 | End: 2022-09-28 | Stop reason: HOSPADM

## 2022-09-23 RX ORDER — ATORVASTATIN CALCIUM 10 MG/1
20 TABLET, FILM COATED ORAL DAILY
Status: DISCONTINUED | OUTPATIENT
Start: 2022-09-24 | End: 2022-09-28 | Stop reason: HOSPADM

## 2022-09-23 RX ORDER — SODIUM CHLORIDE 9 MG/ML
125 INJECTION, SOLUTION INTRAVENOUS CONTINUOUS
Status: DISCONTINUED | OUTPATIENT
Start: 2022-09-23 | End: 2022-09-24

## 2022-09-23 RX ORDER — GABAPENTIN 100 MG/1
100 CAPSULE ORAL 3 TIMES DAILY
Status: DISCONTINUED | OUTPATIENT
Start: 2022-09-24 | End: 2022-09-24

## 2022-09-23 RX ORDER — DEXTROSE MONOHYDRATE 100 MG/ML
INJECTION, SOLUTION INTRAVENOUS
Status: DISCONTINUED | OUTPATIENT
Start: 2022-09-23 | End: 2022-09-24

## 2022-09-23 RX ORDER — ONDANSETRON 2 MG/ML
4 INJECTION INTRAMUSCULAR; INTRAVENOUS EVERY 6 HOURS PRN
Status: DISCONTINUED | OUTPATIENT
Start: 2022-09-23 | End: 2022-09-28 | Stop reason: HOSPADM

## 2022-09-23 RX ORDER — NAPROXEN SODIUM 220 MG/1
81 TABLET, FILM COATED ORAL DAILY
Status: DISCONTINUED | OUTPATIENT
Start: 2022-09-24 | End: 2022-09-28 | Stop reason: HOSPADM

## 2022-09-23 RX ORDER — POTASSIUM CHLORIDE 7.45 MG/ML
80 INJECTION INTRAVENOUS
Status: DISCONTINUED | OUTPATIENT
Start: 2022-09-23 | End: 2022-09-28 | Stop reason: HOSPADM

## 2022-09-23 RX ADMIN — FAMOTIDINE 20 MG: 20 TABLET ORAL at 11:09

## 2022-09-23 RX ADMIN — SODIUM CHLORIDE 2.5 UNITS/HR: 9 INJECTION, SOLUTION INTRAVENOUS at 10:09

## 2022-09-23 RX ADMIN — SODIUM CHLORIDE 125 ML/HR: 0.9 INJECTION, SOLUTION INTRAVENOUS at 10:09

## 2022-09-23 RX ADMIN — SODIUM CHLORIDE 1000 ML: 0.9 INJECTION, SOLUTION INTRAVENOUS at 07:09

## 2022-09-23 RX ADMIN — HALOPERIDOL LACTATE 2.5 MG: 5 INJECTION, SOLUTION INTRAMUSCULAR at 08:09

## 2022-09-23 RX ADMIN — SODIUM BICARBONATE 50 MEQ: 84 INJECTION, SOLUTION INTRAVENOUS at 08:09

## 2022-09-23 RX ADMIN — SODIUM CHLORIDE 1000 ML: 9 INJECTION, SOLUTION INTRAVENOUS at 07:09

## 2022-09-23 NOTE — Clinical Note
Diagnosis: Hyperglycemia [706859]   Admitting Provider:: GISELLE GUZMAN [[id number]]   Future Attending Provider: MAI ISIDRO [[id number]]   Reason for IP Medical Treatment  (Clinical interventions that can only be accomplished in the IP setting? ) :: DKA   Estimated Length of Stay:: 2 midnights   I certify that Inpatient services for greater than or equal to 2 midnights are medically necessary:: Yes   Plans for Post-Acute care--if anticipated (pick the single best option):: A. No post acute care anticipated at this time

## 2022-09-24 LAB
ALLENS TEST: ABNORMAL
ANION GAP SERPL CALC-SCNC: 13 MMOL/L (ref 8–16)
ANION GAP SERPL CALC-SCNC: 16 MMOL/L (ref 8–16)
ANION GAP SERPL CALC-SCNC: 19 MMOL/L (ref 8–16)
ANION GAP SERPL CALC-SCNC: 26 MMOL/L (ref 8–16)
ANION GAP SERPL CALC-SCNC: 6 MMOL/L (ref 8–16)
BASOPHILS # BLD AUTO: 0.05 K/UL (ref 0–0.2)
BASOPHILS NFR BLD: 0.5 % (ref 0–1.9)
BUN SERPL-MCNC: 26 MG/DL (ref 6–20)
BUN SERPL-MCNC: 28 MG/DL (ref 6–20)
BUN SERPL-MCNC: 33 MG/DL (ref 6–20)
BUN SERPL-MCNC: 39 MG/DL (ref 6–20)
BUN SERPL-MCNC: 42 MG/DL (ref 6–20)
CALCIUM SERPL-MCNC: 8.1 MG/DL (ref 8.7–10.5)
CALCIUM SERPL-MCNC: 8.2 MG/DL (ref 8.7–10.5)
CALCIUM SERPL-MCNC: 8.5 MG/DL (ref 8.7–10.5)
CALCIUM SERPL-MCNC: 8.7 MG/DL (ref 8.7–10.5)
CALCIUM SERPL-MCNC: 9.1 MG/DL (ref 8.7–10.5)
CHLORIDE SERPL-SCNC: 109 MMOL/L (ref 95–110)
CHLORIDE SERPL-SCNC: 109 MMOL/L (ref 95–110)
CHLORIDE SERPL-SCNC: 111 MMOL/L (ref 95–110)
CHLORIDE SERPL-SCNC: 111 MMOL/L (ref 95–110)
CHLORIDE SERPL-SCNC: 112 MMOL/L (ref 95–110)
CO2 SERPL-SCNC: 13 MMOL/L (ref 23–29)
CO2 SERPL-SCNC: 18 MMOL/L (ref 23–29)
CO2 SERPL-SCNC: 18 MMOL/L (ref 23–29)
CO2 SERPL-SCNC: 23 MMOL/L (ref 23–29)
CO2 SERPL-SCNC: 6 MMOL/L (ref 23–29)
CREAT SERPL-MCNC: 1.2 MG/DL (ref 0.5–1.4)
CREAT SERPL-MCNC: 1.3 MG/DL (ref 0.5–1.4)
CREAT SERPL-MCNC: 1.4 MG/DL (ref 0.5–1.4)
CREAT SERPL-MCNC: 1.6 MG/DL (ref 0.5–1.4)
CREAT SERPL-MCNC: 1.8 MG/DL (ref 0.5–1.4)
DELSYS: ABNORMAL
DIFFERENTIAL METHOD: ABNORMAL
EOSINOPHIL # BLD AUTO: 0 K/UL (ref 0–0.5)
EOSINOPHIL NFR BLD: 0.1 % (ref 0–8)
ERYTHROCYTE [DISTWIDTH] IN BLOOD BY AUTOMATED COUNT: 14.3 % (ref 11.5–14.5)
EST. GFR  (NO RACE VARIABLE): 44 ML/MIN/1.73 M^2
EST. GFR  (NO RACE VARIABLE): 50 ML/MIN/1.73 M^2
EST. GFR  (NO RACE VARIABLE): 59 ML/MIN/1.73 M^2
EST. GFR  (NO RACE VARIABLE): >60 ML/MIN/1.73 M^2
EST. GFR  (NO RACE VARIABLE): >60 ML/MIN/1.73 M^2
GLUCOSE SERPL-MCNC: 152 MG/DL (ref 70–110)
GLUCOSE SERPL-MCNC: 166 MG/DL (ref 70–110)
GLUCOSE SERPL-MCNC: 176 MG/DL (ref 70–110)
GLUCOSE SERPL-MCNC: 199 MG/DL (ref 70–110)
GLUCOSE SERPL-MCNC: 270 MG/DL (ref 70–110)
HCO3 UR-SCNC: 3.9 MMOL/L (ref 24–28)
HCO3 UR-SCNC: 5.2 MMOL/L (ref 24–28)
HCO3 UR-SCNC: 8.7 MMOL/L (ref 24–28)
HCT VFR BLD AUTO: 42.2 % (ref 40–54)
HGB BLD-MCNC: 13 G/DL (ref 14–18)
IMM GRANULOCYTES # BLD AUTO: 0.13 K/UL (ref 0–0.04)
IMM GRANULOCYTES NFR BLD AUTO: 1.4 % (ref 0–0.5)
LYMPHOCYTES # BLD AUTO: 1.1 K/UL (ref 1–4.8)
LYMPHOCYTES NFR BLD: 12 % (ref 18–48)
MAGNESIUM SERPL-MCNC: 2.4 MG/DL (ref 1.6–2.6)
MCH RBC QN AUTO: 27.2 PG (ref 27–31)
MCHC RBC AUTO-ENTMCNC: 30.8 G/DL (ref 32–36)
MCV RBC AUTO: 88 FL (ref 82–98)
MODE: ABNORMAL
MONOCYTES # BLD AUTO: 1.2 K/UL (ref 0.3–1)
MONOCYTES NFR BLD: 13.5 % (ref 4–15)
NEUTROPHILS # BLD AUTO: 6.7 K/UL (ref 1.8–7.7)
NEUTROPHILS NFR BLD: 72.5 % (ref 38–73)
NRBC BLD-RTO: 0 /100 WBC
OSMOLALITY SERPL: 353 MOSM/KG (ref 280–300)
PCO2 BLDA: 18.2 MMHG (ref 35–45)
PCO2 BLDA: 21.1 MMHG (ref 35–45)
PCO2 BLDA: 22.4 MMHG (ref 35–45)
PH SMN: 6.94 [PH] (ref 7.35–7.45)
PH SMN: 6.97 [PH] (ref 7.35–7.45)
PH SMN: 7.22 [PH] (ref 7.35–7.45)
PHOSPHATE SERPL-MCNC: 2.7 MG/DL (ref 2.7–4.5)
PLATELET # BLD AUTO: 346 K/UL (ref 150–450)
PMV BLD AUTO: 9.7 FL (ref 9.2–12.9)
PO2 BLDA: 115 MMHG (ref 80–100)
PO2 BLDA: 50 MMHG (ref 40–60)
PO2 BLDA: 57 MMHG (ref 40–60)
POC BE: -17 MMOL/L
POC BE: -25 MMOL/L
POC BE: -27 MMOL/L
POC SATURATED O2: 63 % (ref 95–100)
POC SATURATED O2: 70 % (ref 95–100)
POC SATURATED O2: 98 % (ref 95–100)
POC TCO2: 6 MMOL/L (ref 24–29)
POC TCO2: 9 MMOL/L (ref 23–27)
POC TCO2: <5 MMOL/L (ref 24–29)
POCT GLUCOSE: 129 MG/DL (ref 70–110)
POCT GLUCOSE: 145 MG/DL (ref 70–110)
POCT GLUCOSE: 155 MG/DL (ref 70–110)
POCT GLUCOSE: 155 MG/DL (ref 70–110)
POCT GLUCOSE: 160 MG/DL (ref 70–110)
POCT GLUCOSE: 178 MG/DL (ref 70–110)
POCT GLUCOSE: 188 MG/DL (ref 70–110)
POCT GLUCOSE: 192 MG/DL (ref 70–110)
POCT GLUCOSE: 199 MG/DL (ref 70–110)
POCT GLUCOSE: 207 MG/DL (ref 70–110)
POCT GLUCOSE: 213 MG/DL (ref 70–110)
POCT GLUCOSE: 232 MG/DL (ref 70–110)
POCT GLUCOSE: 240 MG/DL (ref 70–110)
POCT GLUCOSE: 250 MG/DL (ref 70–110)
POCT GLUCOSE: 264 MG/DL (ref 70–110)
POCT GLUCOSE: 280 MG/DL (ref 70–110)
POCT GLUCOSE: 305 MG/DL (ref 70–110)
POCT GLUCOSE: 331 MG/DL (ref 70–110)
POCT GLUCOSE: 404 MG/DL (ref 70–110)
POCT GLUCOSE: 473 MG/DL (ref 70–110)
POCT GLUCOSE: >500 MG/DL (ref 70–110)
POTASSIUM SERPL-SCNC: 3.8 MMOL/L (ref 3.5–5.1)
POTASSIUM SERPL-SCNC: 3.9 MMOL/L (ref 3.5–5.1)
POTASSIUM SERPL-SCNC: 4.2 MMOL/L (ref 3.5–5.1)
POTASSIUM SERPL-SCNC: 4.5 MMOL/L (ref 3.5–5.1)
POTASSIUM SERPL-SCNC: 4.6 MMOL/L (ref 3.5–5.1)
RBC # BLD AUTO: 4.78 M/UL (ref 4.6–6.2)
SAMPLE: ABNORMAL
SITE: ABNORMAL
SODIUM SERPL-SCNC: 141 MMOL/L (ref 136–145)
SODIUM SERPL-SCNC: 141 MMOL/L (ref 136–145)
SODIUM SERPL-SCNC: 142 MMOL/L (ref 136–145)
SODIUM SERPL-SCNC: 143 MMOL/L (ref 136–145)
SODIUM SERPL-SCNC: 143 MMOL/L (ref 136–145)
WBC # BLD AUTO: 9.19 K/UL (ref 3.9–12.7)

## 2022-09-24 PROCEDURE — 25000003 PHARM REV CODE 250: Performed by: INTERNAL MEDICINE

## 2022-09-24 PROCEDURE — 82803 BLOOD GASES ANY COMBINATION: CPT

## 2022-09-24 PROCEDURE — 83735 ASSAY OF MAGNESIUM: CPT | Performed by: STUDENT IN AN ORGANIZED HEALTH CARE EDUCATION/TRAINING PROGRAM

## 2022-09-24 PROCEDURE — S5010 5% DEXTROSE AND 0.45% SALINE: HCPCS | Performed by: STUDENT IN AN ORGANIZED HEALTH CARE EDUCATION/TRAINING PROGRAM

## 2022-09-24 PROCEDURE — 63600175 PHARM REV CODE 636 W HCPCS: Performed by: STUDENT IN AN ORGANIZED HEALTH CARE EDUCATION/TRAINING PROGRAM

## 2022-09-24 PROCEDURE — 20000000 HC ICU ROOM

## 2022-09-24 PROCEDURE — 85025 COMPLETE CBC W/AUTO DIFF WBC: CPT | Performed by: STUDENT IN AN ORGANIZED HEALTH CARE EDUCATION/TRAINING PROGRAM

## 2022-09-24 PROCEDURE — 80048 BASIC METABOLIC PNL TOTAL CA: CPT | Mod: 91 | Performed by: STUDENT IN AN ORGANIZED HEALTH CARE EDUCATION/TRAINING PROGRAM

## 2022-09-24 PROCEDURE — 94761 N-INVAS EAR/PLS OXIMETRY MLT: CPT

## 2022-09-24 PROCEDURE — 84100 ASSAY OF PHOSPHORUS: CPT | Performed by: STUDENT IN AN ORGANIZED HEALTH CARE EDUCATION/TRAINING PROGRAM

## 2022-09-24 PROCEDURE — 36600 WITHDRAWAL OF ARTERIAL BLOOD: CPT

## 2022-09-24 PROCEDURE — 99900035 HC TECH TIME PER 15 MIN (STAT)

## 2022-09-24 PROCEDURE — 36415 COLL VENOUS BLD VENIPUNCTURE: CPT | Performed by: STUDENT IN AN ORGANIZED HEALTH CARE EDUCATION/TRAINING PROGRAM

## 2022-09-24 PROCEDURE — 25000003 PHARM REV CODE 250: Performed by: STUDENT IN AN ORGANIZED HEALTH CARE EDUCATION/TRAINING PROGRAM

## 2022-09-24 PROCEDURE — 51702 INSERT TEMP BLADDER CATH: CPT

## 2022-09-24 PROCEDURE — 63600175 PHARM REV CODE 636 W HCPCS: Performed by: INTERNAL MEDICINE

## 2022-09-24 RX ORDER — MUPIROCIN 20 MG/G
OINTMENT TOPICAL 2 TIMES DAILY
Status: DISCONTINUED | OUTPATIENT
Start: 2022-09-24 | End: 2022-09-28 | Stop reason: HOSPADM

## 2022-09-24 RX ORDER — DEXTROSE MONOHYDRATE, SODIUM CHLORIDE, SODIUM LACTATE, POTASSIUM CHLORIDE, CALCIUM CHLORIDE 5; 600; 310; 179; 20 G/100ML; MG/100ML; MG/100ML; MG/100ML; MG/100ML
INJECTION, SOLUTION INTRAVENOUS CONTINUOUS
Status: DISCONTINUED | OUTPATIENT
Start: 2022-09-24 | End: 2022-09-24

## 2022-09-24 RX ORDER — DEXTROSE MONOHYDRATE AND SODIUM CHLORIDE 5; .45 G/100ML; G/100ML
125 INJECTION, SOLUTION INTRAVENOUS CONTINUOUS
Status: DISCONTINUED | OUTPATIENT
Start: 2022-09-24 | End: 2022-09-24

## 2022-09-24 RX ORDER — INSULIN ASPART 100 [IU]/ML
1-10 INJECTION, SOLUTION INTRAVENOUS; SUBCUTANEOUS
Status: DISCONTINUED | OUTPATIENT
Start: 2022-09-24 | End: 2022-09-26 | Stop reason: DRUGHIGH

## 2022-09-24 RX ORDER — HEPARIN SODIUM 5000 [USP'U]/ML
5000 INJECTION, SOLUTION INTRAVENOUS; SUBCUTANEOUS EVERY 12 HOURS
Status: DISCONTINUED | OUTPATIENT
Start: 2022-09-24 | End: 2022-09-28 | Stop reason: HOSPADM

## 2022-09-24 RX ORDER — DEXTROSE MONOHYDRATE, SODIUM CHLORIDE, SODIUM LACTATE, POTASSIUM CHLORIDE, CALCIUM CHLORIDE 5; 600; 310; 179; 20 G/100ML; MG/100ML; MG/100ML; MG/100ML; MG/100ML
INJECTION, SOLUTION INTRAVENOUS CONTINUOUS
Status: DISCONTINUED | OUTPATIENT
Start: 2022-09-24 | End: 2022-09-26

## 2022-09-24 RX ORDER — GLUCAGON 1 MG
1 KIT INJECTION
Status: DISCONTINUED | OUTPATIENT
Start: 2022-09-24 | End: 2022-09-26 | Stop reason: DRUGHIGH

## 2022-09-24 RX ORDER — IBUPROFEN 200 MG
24 TABLET ORAL
Status: DISCONTINUED | OUTPATIENT
Start: 2022-09-24 | End: 2022-09-26 | Stop reason: DRUGHIGH

## 2022-09-24 RX ORDER — IBUPROFEN 200 MG
16 TABLET ORAL
Status: DISCONTINUED | OUTPATIENT
Start: 2022-09-24 | End: 2022-09-26 | Stop reason: DRUGHIGH

## 2022-09-24 RX ADMIN — FAMOTIDINE 20 MG: 20 TABLET ORAL at 08:09

## 2022-09-24 RX ADMIN — SODIUM CHLORIDE 2.5 UNITS/HR: 9 INJECTION, SOLUTION INTRAVENOUS at 06:09

## 2022-09-24 RX ADMIN — ASPIRIN 81 MG CHEWABLE TABLET 81 MG: 81 TABLET CHEWABLE at 08:09

## 2022-09-24 RX ADMIN — SODIUM BICARBONATE: 84 INJECTION, SOLUTION INTRAVENOUS at 03:09

## 2022-09-24 RX ADMIN — INSULIN DETEMIR 40 UNITS: 100 INJECTION, SOLUTION SUBCUTANEOUS at 08:09

## 2022-09-24 RX ADMIN — HEPARIN SODIUM 5000 UNITS: 5000 INJECTION INTRAVENOUS; SUBCUTANEOUS at 08:09

## 2022-09-24 RX ADMIN — HEPARIN SODIUM 5000 UNITS: 5000 INJECTION INTRAVENOUS; SUBCUTANEOUS at 03:09

## 2022-09-24 RX ADMIN — DEXTROSE AND SODIUM CHLORIDE 125 ML/HR: 5; .45 INJECTION, SOLUTION INTRAVENOUS at 08:09

## 2022-09-24 RX ADMIN — POTASSIUM CHLORIDE, SODIUM CHLORIDE, CALCIUM CHLORIDE, SODIUM LACTATE, AND DEXTROSE MONOHYDRATE: 1.79; 6; .2; 3.1; 5 INJECTION, SOLUTION INTRAVENOUS at 09:09

## 2022-09-24 RX ADMIN — HALOPERIDOL LACTATE 2 MG: 5 INJECTION, SOLUTION INTRAMUSCULAR at 12:09

## 2022-09-24 RX ADMIN — POLYETHYLENE GLYCOL 3350 17 G: 17 POWDER, FOR SOLUTION ORAL at 08:09

## 2022-09-24 RX ADMIN — MUPIROCIN: 20 OINTMENT TOPICAL at 08:09

## 2022-09-24 RX ADMIN — MUPIROCIN: 20 OINTMENT TOPICAL at 09:09

## 2022-09-24 RX ADMIN — SODIUM CHLORIDE 125 ML/HR: 0.9 INJECTION, SOLUTION INTRAVENOUS at 12:09

## 2022-09-24 RX ADMIN — ATORVASTATIN CALCIUM 20 MG: 10 TABLET, FILM COATED ORAL at 08:09

## 2022-09-24 RX ADMIN — INSULIN HUMAN 5.21 UNITS: 100 INJECTION, SOLUTION PARENTERAL at 02:09

## 2022-09-24 RX ADMIN — POTASSIUM CHLORIDE 40 MEQ: 7.46 INJECTION, SOLUTION INTRAVENOUS at 05:09

## 2022-09-24 RX ADMIN — POTASSIUM CHLORIDE, SODIUM CHLORIDE, CALCIUM CHLORIDE, SODIUM LACTATE, AND DEXTROSE MONOHYDRATE: 1.79; 6; .2; 3.1; 5 INJECTION, SOLUTION INTRAVENOUS at 01:09

## 2022-09-24 RX ADMIN — GABAPENTIN 100 MG: 100 CAPSULE ORAL at 08:09

## 2022-09-24 NOTE — EICU
Intervention Initiated From:  Bedside    Reyes Communicated with Bedside Nurse regarding:  Medication and Other  Bedside RN requesting order for midline catheter & reporting pt has no IV access in order to give insulin IV drip.    Doctor Notified:  Yes  Comments: Dr. Blackburn

## 2022-09-24 NOTE — ASSESSMENT & PLAN NOTE
- Reported history of COPD  - No PFTs on file  - Not on O2  - Not in exacerbation      Plan:   - Duo-Nebs PRN   - Outpatient follow up with pulmonary/PCP

## 2022-09-24 NOTE — ED NOTES
Please call mother (Marilou) if there are any problems or changes with the patient.     [phone]

## 2022-09-24 NOTE — SUBJECTIVE & OBJECTIVE
"Past Medical History:   Diagnosis Date    Acute on chronic pancreatitis 04/03/2016    Addiction to drug     CHF (congestive heart failure)     DKA (diabetic ketoacidoses) 02/2020    DM (diabetes mellitus), type 1     HCV (hepatitis C virus)     high VL     HTN (hypertension)     Hx of psychiatric care     Hypomagnesemia 05/25/2020    IVDU (intravenous drug user)     Heroin    Pancreatitis     Psychiatric problem     Substance abuse        Past Surgical History:   Procedure Laterality Date    CARDIAC SURGERY  1999    stent placed. ([facility])    ESOPHAGOGASTRODUODENOSCOPY N/A 3/5/2020    Gastritis.  No H pylori.  Completed PPI for 1 month.  Procedure: EGD (ESOPHAGOGASTRODUODENOSCOPY);  Surgeon: Brinda Rene MD;  Location: [facility] ENDO;  Service: Endoscopy;  Laterality: N/A;  W421 A; [id number]    LEFT HEART CATHETERIZATION Left 9/28/2020    Procedure: Left heart cath;  Surgeon: Fito Rangel MD;  Location: [facility] CATH LAB;  Service: Cardiology;  Laterality: Left;    SHOULDER SURGERY  2013    right shoulder, spur removal.       Review of patient's allergies indicates:  No Known Allergies    No current facility-administered medications on file prior to encounter.     Current Outpatient Medications on File Prior to Encounter   Medication Sig    aspirin 81 MG Chew Take 1 tablet (81 mg total) by mouth once daily.    atorvastatin (LIPITOR) 20 MG tablet Take 1 tablet (20 mg total) by mouth once daily.    blood sugar diagnostic Strp Use To check blood glucose three times daily,    gabapentin (NEURONTIN) 100 MG capsule Take 100 mg by mouth 3 (three) times daily.    insulin lispro 100 unit/mL pen Inject 7 Units into the skin 3 (three) times daily with meals.    insulin syringe-needle U-100 0.5 mL 31 gauge x 5/16" Syrg 1 each by Misc.(Non-Drug; Combo Route) route 3 (three) times daily.    lancets Misc To check BG 3 times daily, to use with insurance preferred meter    insulin detemir U-100 (LEVEMIR FLEXTOUCH) 100 unit/mL " "(3 mL) SubQ InPn pen Inject 40 Units into the skin every evening.    naloxone (NARCAN) 4 mg/actuation Spry 4mg by nasal route as needed for opioid overdose; may repeat every 2-3 minutes in alternating nostrils until medical help arrives. Call 911    nitroGLYCERIN (NITROSTAT) 0.4 MG SL tablet Place 1 tablet (0.4 mg total) under the tongue every 5 (five) minutes as needed for Chest pain. (Patient not taking: Reported on 2022)    pen needle, diabetic (CLICKFINE PEN NEEDLE) 32 gauge x /32" Ndle 1 each by Misc.(Non-Drug; Combo Route) route 4 (four) times daily before meals and nightly.    [DISCONTINUED] insulin aspart U-100 (NOVOLOG) 100 unit/mL injection Inject 1-10 Units into the skin 3 (three) times daily with meals.     Family History       Problem Relation (Age of Onset)    Asthma Mother          Tobacco Use    Smoking status: Every Day     Packs/day: 1.00     Years: 20.00     Pack years: 20.00     Types: Cigarettes     Start date: 4/3/1981     Last attempt to quit: 2020     Years since quittin.3    Smokeless tobacco: Never    Tobacco comments:     states approx 1 per day.   Substance and Sexual Activity    Alcohol use: Not Currently     Alcohol/week: 0.0 standard drinks    Drug use: Yes     Types: Heroin, IV, Marijuana, Amphetamines    Sexual activity: Yes     Partners: Female     Review of Systems   Unable to perform ROS: Mental status change   Objective:     Vital Signs (Most Recent):  Temp:  (unable to keep probe in his mouth) (22)  Pulse: 107 (22)  Resp: (!) 30 (22)  BP: 132/60 (22)  SpO2: 100 % (22)   Vital Signs (24h Range):  Pulse:  [103-110] 107  Resp:  [22-30] 30  SpO2:  [90 %-100 %] 100 %  BP: (132-183)/(60-82) 132/60     Weight: 51.3 kg (113 lb)  Body mass index is 17.18 kg/m².    Physical Exam  Constitutional:       Appearance: He is ill-appearing.   HENT:      Head: Normocephalic and atraumatic.      Nose: Nose normal.      " Mouth/Throat:      Mouth: Mucous membranes are dry.   Eyes:      Conjunctiva/sclera: Conjunctivae normal.   Cardiovascular:      Rate and Rhythm: Normal rate.      Pulses: Normal pulses.      Heart sounds: No murmur heard.  Pulmonary:      Effort: Respiratory distress present.      Comments: Tachypnic     Abdominal:      General: Abdomen is flat.      Palpations: Abdomen is soft.   Musculoskeletal:      Cervical back: Neck supple.   Skin:     General: Skin is warm and dry.      Coloration: Skin is not jaundiced.   Neurological:      Mental Status: He is alert. He is disoriented.      Comments: A&Ox1. Follows simple commands.    Psychiatric:      Comments: Combative            Significant Labs: All pertinent labs within the past 24 hours have been reviewed.    Significant Imaging: I have reviewed all pertinent imaging results/findings within the past 24 hours.

## 2022-09-24 NOTE — ASSESSMENT & PLAN NOTE
Nutrition consulted.   Most recent weight and BMI monitored    Measurements:  Wt Readings from Last 1 Encounters:   09/23/22 51.3 kg (113 lb)   Body mass index is 17.18 kg/m².

## 2022-09-24 NOTE — ASSESSMENT & PLAN NOTE
- Admission creatinine: 1.9   - Baseline of 1.2   - Likely hypovolemic in the setting of DKA     Plan:   - IV Fluids   - Monitor BMPs  - Avoid nephrotoxins

## 2022-09-24 NOTE — PT/OT/SLP PROGRESS
Occupational Therapy      Patient Name:  Cali Mabry   MRN:  [id number]    Attempted to evaluate patient at 9:08 AM.  Patient not seen today secondary to being very lethargic, having AMS, and being unable to participate.  Will follow-up tomorrow or as able.   9/24/2022

## 2022-09-24 NOTE — ASSESSMENT & PLAN NOTE
- History of non-compliance, agitation and SI   - Agitated in the ER during this admission, improved with IV haldol     Plan:   - Haldol PRN

## 2022-09-24 NOTE — EICU
57 year old male admitted with DKA.Severe metabolic acidosis with PH 6.99 noted.Insulin infusion with bolus delayed due to poor IV access.  Past history of DM type 1,COPD,CAD,HTN,HLD,substance use disorder,hepatitis C,chronic pancreatitis.      On camera assessment chronically ill looking,Kussmaul respiration,in distress  ,SPO2 100%,/71,RR 51    Data  Anion gap 32  Glucose 642  Creatinine 1.9  Potassium 5.8  EKG Sinus tachycardia.  ECHO 9/2020 EF 60%    Assessment and plan:  1.DKA-IV hydration,insulin infusion with bolus,sodium bicarbonate infusion as PH less than 7,BMP every 4 hours,correct electrolytes as needed.check ABG in 4 hours if unable to correct PH >7 may need intubation.  2.Substance use disorder-watch for amphetamine withdrawal.  3.COPD-bronchodilators as needed.  DVT prophylaxis-SQ heparin.

## 2022-09-24 NOTE — PROGRESS NOTES
[facility] Medicine  Progress Note    Patient Name: Cali Mabry  MRN: [id number]  Patient Class: IP- Inpatient   Admission Date: 2022  Length of Stay: 1 days  Attending Physician: Darren Gorman MD  Primary Care Provider: Primary Doctor No        Subjective:     Principal Problem:Diabetic ketoacidosis without coma associated with type 1 diabetes mellitus        HPI:  This is a 57 year old male with a PMHx of T1DM, COPD (not on O2), CAD, HTN, HLD, substance use (including IVDU), MDD who presented with hyperglycemia and AMS.     The patient was altered and would not participate with history taking, no family was at bedside at the time of examination. Per chart review, the patient's family is unsure if he is taking his medications and reported that he gets confused about how to take them. He was reported to have intermittent nausea, vomiting and progressively became more altered. Of note, the patient was recently hospitalized ( - ) with DKA, and has a history of agitation. Last admission, he was evatlued by psychiatry for SI and was PEC'd but ultimately his PEC was rescinded as he was deemed to be seeking attention. The patient denies having any acute complaints but is very lethargic/slow to response. While in the ER, the patient was tachycardic (100s) and tachypnic (20s). Laboratory evaluation was remarkable for HAGMA (PH: 6.9, PCO2: 33.9, A), hyperglycemia (642), hyperkalemia (5.8), leukocytosis (12.9), elevated creatinine (1.9- baseline of 1.2). CXR was negative. UA was unremarkable for an infectious process but demonstrated ketonuria, glucosuria and protienuria. he was started on the DKA protocol and was admitted for further management. Of note, he patient had episodes of agitation which improved with haldol.       Overview/Hospital Course:  No notes on file    Interval History: Lethargic this AM    Review of Systems   Unable to perform ROS: Mental status change    Objective:     Vital Signs (Most Recent):  Temp: 97.9 °F (36.6 °C) (09/24/22 0700)  Pulse: 99 (09/24/22 0800)  Resp: (!) 25 (09/24/22 0800)  BP: (!) 142/81 (09/24/22 0800)  SpO2: 98 % (09/24/22 0800)   Vital Signs (24h Range):  Temp:  [97.4 °F (36.3 °C)-97.9 °F (36.6 °C)] 97.9 °F (36.6 °C)  Pulse:  [] 99  Resp:  [22-55] 25  SpO2:  [90 %-100 %] 98 %  BP: (124-183)/(60-95) 142/81     Weight: 52.1 kg (114 lb 13.8 oz)  Body mass index is 17.46 kg/m².    Intake/Output Summary (Last 24 hours) at 9/24/2022 0839  Last data filed at 9/24/2022 0800  Gross per 24 hour   Intake 3397.55 ml   Output 1225 ml   Net 2172.55 ml      Physical Exam  Constitutional:       General: He is not in acute distress.     Appearance: He is ill-appearing. He is not toxic-appearing or diaphoretic.   Cardiovascular:      Rate and Rhythm: Regular rhythm. Tachycardia present.      Heart sounds: No murmur heard.    No gallop.   Pulmonary:      Effort: Pulmonary effort is normal. No respiratory distress.      Breath sounds: Normal breath sounds. No wheezing.   Abdominal:      General: Bowel sounds are normal. There is no distension.      Palpations: Abdomen is soft.      Tenderness: There is no abdominal tenderness.       Significant Labs: All pertinent labs within the past 24 hours have been reviewed.    Significant Imaging: I have reviewed all pertinent imaging results/findings within the past 24 hours.      Assessment/Plan:      * Diabetic ketoacidosis without coma associated with type 1 diabetes mellitus  - Patient with a history of diabetes  - Last A1c:   Lab Results   Component Value Date    HGBA1C 10.8 (H) 09/17/2022     - Labs suggestive of DKA   - Likely secondary to medication non compliance. Less likely to be triggered by infection as his work up is negative. Leukocytosis is likely reactive.     Plan:  - DKA protocol   - Continue IVF   - Transition to injectable insulin once gap closes/patient is tolerating po intake     Substance  induced mood disorder  - History of non-compliance, agitation and SI   - Agitated in the ER during this admission, improved with IV haldol     Plan:   - Haldol PRN     RAMYA (acute kidney injury)  - Admission creatinine: 1.9   - Baseline of 1.2   - Likely hypovolemic in the setting of DKA     Plan:   - IV Fluids   - Monitor BMPs  - Avoid nephrotoxins     CAD (coronary artery disease)  - Cath (9/28/2020):  60% in stent restenosis of previously placed right coronary artery stent s/p balloon angioplasty. 50% stenosis noted in the mid and distal left anterior descending artery & 70 % stenosis at the apex, small caliber.  - Home medications: ASA, Lipitor     Plan:   - Resume home medications     COPD (chronic obstructive pulmonary disease)  - Reported history of COPD  - No PFTs on file  - Not on O2  - Not in exacerbation      Plan:   - Duo-Nebs PRN   - Outpatient follow up with pulmonary/PCP    Moderate protein malnutrition  Nutrition consulted.   Most recent weight and BMI monitored    Measurements:  Wt Readings from Last 1 Encounters:   09/23/22 52.1 kg (114 lb 13.8 oz)   Body mass index is 17.46 kg/m².    Chronic hepatitis C  - H/O IVDU with heroin   - Outpatient follow up with hepatology       Hyperlipidemia  - Resume Lipitor 20 mg     Essential hypertension  - History of HTN but not on active medications at this time   - Continue to monitor       VTE Risk Mitigation (From admission, onward)         Ordered     heparin (porcine) injection 5,000 Units  Every 12 hours         09/24/22 0312     Place sequential compression device  Until discontinued         09/23/22 2132     IP VTE LOW RISK PATIENT  Once         09/23/22 2132                Discharge Planning   ROLA:      Code Status: Full Code   Is the patient medically ready for discharge?:     Reason for patient still in hospital (select all that apply): Patient trending condition, Laboratory test, Treatment, Consult recommendations and Pending disposition                Critical care time spent on the evaluation and treatment of severe organ dysfunction, review of pertinent labs and imaging studies, discussions with consulting providers and discussions with patient/family: 45 minutes.      Darren Gorman MD  Department of Hospital Medicine   [facility] [place] - Intensive Care

## 2022-09-24 NOTE — PLAN OF CARE
[place] - Intensive Care  Initial Discharge Assessment       Primary Care Provider: Primary Doctor No    Admission Diagnosis: Hyperglycemia [R73.9]  Diabetic ketoacidosis without coma associated with type 2 diabetes mellitus [E11.10]    Admission Date: 9/23/2022  Expected Discharge Date: N/A        Unable to verify information from patient and/or emergency contacts.

## 2022-09-24 NOTE — ASSESSMENT & PLAN NOTE
- Patient with a history of diabetes  - Last A1c:   Lab Results   Component Value Date    HGBA1C 10.8 (H) 09/17/2022     - Labs suggestive of DKA   - Likely secondary to medication non compliance. Less likely to be triggered by infection as his work up is negative. Leukocytosis is likely reactive.     Plan:  - DKA protocol   - Continue IVF   - Transition to injectable insulin once gap closes/patient is tolerating po intake

## 2022-09-24 NOTE — ASSESSMENT & PLAN NOTE
- Cath (9/28/2020):  60% in stent restenosis of previously placed right coronary artery stent s/p balloon angioplasty. 50% stenosis noted in the mid and distal left anterior descending artery & 70 % stenosis at the apex, small caliber.  - Home medications: ASA, Lipitor     Plan:   - Resume home medications

## 2022-09-24 NOTE — H&P
[facility] Medicine  History & Physical    Patient Name: Cali Mabry  MRN: [id number]  Patient Class: IP- Inpatient  Admission Date: 2022  Attending Physician: Josep Simon MD  Primary Care Provider: Primary Doctor No         Patient information was obtained from relative(s) and ER records.     Subjective:     Principal Problem:Diabetic ketoacidosis without coma associated with type 1 diabetes mellitus    Chief Complaint:   Chief Complaint   Patient presents with    Altered Mental Status     Presents to the ED via EMS with c/o AMS, Kussmaul Respirations, and hyperglycemia of >500. Reports recently being admitted to ICU x 1 week for DKA.         HPI: This is a 57 year old male with a PMHx of T1DM, COPD (not on O2), CAD, HTN, HLD, substance use (including IVDU), MDD who presented with hyperglycemia and AMS.     The patient was altered and would not participate with history taking, no family was at bedside at the time of examination. Per chart review, the patient's family is unsure if he is taking his medications and reported that he gets confused about how to take them. He was reported to have intermittent nausea, vomiting and progressively became more altered. Of note, the patient was recently hospitalized ( - ) with DKA, and has a history of agitation. Last admission, he was evatlued by psychiatry for SI and was PEC'd but ultimately his PEC was rescinded as he was deemed to be seeking attention. The patient denies having any acute complaints but is very lethargic/slow to response. While in the ER, the patient was tachycardic (100s) and tachypnic (20s). Laboratory evaluation was remarkable for HAGMA (PH: 6.9, PCO2: 33.9, A), hyperglycemia (642), hyperkalemia (5.8), leukocytosis (12.9), elevated creatinine (1.9- baseline of 1.2). CXR was negative. UA was unremarkable for an infectious process but demonstrated ketonuria, glucosuria and protienuria. he was started on the  "DKA protocol and was admitted for further management. Of note, he patient had episodes of agitation which improved with haldol.       Past Medical History:   Diagnosis Date    Acute on chronic pancreatitis 04/03/2016    Addiction to drug     CHF (congestive heart failure)     DKA (diabetic ketoacidoses) 02/2020    DM (diabetes mellitus), type 1     HCV (hepatitis C virus)     high VL     HTN (hypertension)     Hx of psychiatric care     Hypomagnesemia 05/25/2020    IVDU (intravenous drug user)     Heroin    Pancreatitis     Psychiatric problem     Substance abuse        Past Surgical History:   Procedure Laterality Date    CARDIAC SURGERY  1999    stent placed. ([facility])    ESOPHAGOGASTRODUODENOSCOPY N/A 3/5/2020    Gastritis.  No H pylori.  Completed PPI for 1 month.  Procedure: EGD (ESOPHAGOGASTRODUODENOSCOPY);  Surgeon: Brinda Rene MD;  Location: [facility] ENDO;  Service: Endoscopy;  Laterality: N/A;  W421 A; [phone]    LEFT HEART CATHETERIZATION Left 9/28/2020    Procedure: Left heart cath;  Surgeon: Fito Rangel MD;  Location: [facility] CATH LAB;  Service: Cardiology;  Laterality: Left;    SHOULDER SURGERY  2013    right shoulder, spur removal.       Review of patient's allergies indicates:  No Known Allergies    No current facility-administered medications on file prior to encounter.     Current Outpatient Medications on File Prior to Encounter   Medication Sig    aspirin 81 MG Chew Take 1 tablet (81 mg total) by mouth once daily.    atorvastatin (LIPITOR) 20 MG tablet Take 1 tablet (20 mg total) by mouth once daily.    blood sugar diagnostic Strp Use To check blood glucose three times daily,    gabapentin (NEURONTIN) 100 MG capsule Take 100 mg by mouth 3 (three) times daily.    insulin lispro 100 unit/mL pen Inject 7 Units into the skin 3 (three) times daily with meals.    insulin syringe-needle U-100 0.5 mL 31 gauge x 5/16" Syrg 1 each by Misc.(Non-Drug; Combo Route) route 3 " "(three) times daily.    lancets Misc To check BG 3 times daily, to use with insurance preferred meter    insulin detemir U-100 (LEVEMIR FLEXTOUCH) 100 unit/mL (3 mL) SubQ InPn pen Inject 40 Units into the skin every evening.    naloxone (NARCAN) 4 mg/actuation Spry 4mg by nasal route as needed for opioid overdose; may repeat every 2-3 minutes in alternating nostrils until medical help arrives. Call 911    nitroGLYCERIN (NITROSTAT) 0.4 MG SL tablet Place 1 tablet (0.4 mg total) under the tongue every 5 (five) minutes as needed for Chest pain. (Patient not taking: Reported on 2022)    pen needle, diabetic (CLICKFINE PEN NEEDLE) 32 gauge x 32" Ndle 1 each by Misc.(Non-Drug; Combo Route) route 4 (four) times daily before meals and nightly.    [DISCONTINUED] insulin aspart U-100 (NOVOLOG) 100 unit/mL injection Inject 1-10 Units into the skin 3 (three) times daily with meals.     Family History       Problem Relation (Age of Onset)    Asthma Mother          Tobacco Use    Smoking status: Every Day     Packs/day: 1.00     Years: 20.00     Pack years: 20.00     Types: Cigarettes     Start date: 4/3/1981     Last attempt to quit: 2020     Years since quittin.3    Smokeless tobacco: Never    Tobacco comments:     states approx 1 per day.   Substance and Sexual Activity    Alcohol use: Not Currently     Alcohol/week: 0.0 standard drinks    Drug use: Yes     Types: Heroin, IV, Marijuana, Amphetamines    Sexual activity: Yes     Partners: Female     Review of Systems   Unable to perform ROS: Mental status change   Objective:     Vital Signs (Most Recent):  Temp:  (unable to keep probe in his mouth) (22)  Pulse: 107 (22)  Resp: (!) 30 (22)  BP: 132/60 (22)  SpO2: 100 % (22)   Vital Signs (24h Range):  Pulse:  [103-110] 107  Resp:  [22-30] 30  SpO2:  [90 %-100 %] 100 %  BP: (132-183)/(60-82) 132/60     Weight: 51.3 kg (113 lb)  Body mass index is " 17.18 kg/m².    Physical Exam  Constitutional:       Appearance: He is ill-appearing.   HENT:      Head: Normocephalic and atraumatic.      Nose: Nose normal.      Mouth/Throat:      Mouth: Mucous membranes are dry.   Eyes:      Conjunctiva/sclera: Conjunctivae normal.   Cardiovascular:      Rate and Rhythm: Normal rate.      Pulses: Normal pulses.      Heart sounds: No murmur heard.  Pulmonary:      Effort: Respiratory distress present.      Comments: Tachypnic     Abdominal:      General: Abdomen is flat.      Palpations: Abdomen is soft.   Musculoskeletal:      Cervical back: Neck supple.   Skin:     General: Skin is warm and dry.      Coloration: Skin is not jaundiced.   Neurological:      Mental Status: He is alert. He is disoriented.      Comments: A&Ox1. Follows simple commands.    Psychiatric:      Comments: Combative            Significant Labs: All pertinent labs within the past 24 hours have been reviewed.    Significant Imaging: I have reviewed all pertinent imaging results/findings within the past 24 hours.    Assessment/Plan:     * Diabetic ketoacidosis without coma associated with type 1 diabetes mellitus  - Patient with a history of diabetes  - Last A1c:   Lab Results   Component Value Date    HGBA1C 10.8 (H) 09/17/2022     - Labs suggestive of DKA   - Likely secondary to medication non compliance. Less likely to be triggered by infection as his work up is negative. Leukocytosis is likely reactive.     Plan:  - DKA protocol   - Continue IVF   - Transition to injectable insulin once gap closes/patient is tolerating po intake     RAMYA (acute kidney injury)  - Admission creatinine: 1.9   - Baseline of 1.2   - Likely hypovolemic in the setting of DKA     Plan:   - IV Fluids   - Monitor BMPs  - Avoid nephrotoxins     CAD (coronary artery disease)  - Cath (9/28/2020):  60% in stent restenosis of previously placed right coronary artery stent s/p balloon angioplasty. 50% stenosis noted in the mid and distal  left anterior descending artery & 70 % stenosis at the apex, small caliber.  - Home medications: ASA, Lipitor     Plan:   - Resume home medications     COPD (chronic obstructive pulmonary disease)  - Reported history of COPD  - No PFTs on file  - Not on O2  - Not in exacerbation      Plan:   - Duo-Nebs PRN   - Outpatient follow up with pulmonary/PCP    Substance induced mood disorder  - History of non-compliance, agitation and SI   - Agitated in the ER during this admission, improved with IV haldol     Plan:   - Haldol PRN     Moderate protein malnutrition  Nutrition consulted.   Most recent weight and BMI monitored    Measurements:  Wt Readings from Last 1 Encounters:   09/23/22 51.3 kg (113 lb)   Body mass index is 17.18 kg/m².    Chronic hepatitis C  - H/O IVDU with heroin   - Outpatient follow up with hepatology       Hyperlipidemia  - Resume Lipitor 20 mg     Essential hypertension  - History of HTN but not on active medications at this time   - Continue to monitor     VTE Risk Mitigation (From admission, onward)         Ordered     Place sequential compression device  Until discontinued         09/23/22 2132     IP VTE LOW RISK PATIENT  Once         09/23/22 2132              Critical care time spent on the evaluation and treatment of severe organ dysfunction, review of pertinent labs and imaging studies, discussions with consulting providers and discussions with patient/family: 45 minutes.     Josep Simon MD  Department of Hospital Medicine   [facility]

## 2022-09-24 NOTE — SUBJECTIVE & OBJECTIVE
Interval History: Lethargic this AM    Review of Systems   Unable to perform ROS: Mental status change   Objective:     Vital Signs (Most Recent):  Temp: 97.9 °F (36.6 °C) (09/24/22 0700)  Pulse: 99 (09/24/22 0800)  Resp: (!) 25 (09/24/22 0800)  BP: (!) 142/81 (09/24/22 0800)  SpO2: 98 % (09/24/22 0800)   Vital Signs (24h Range):  Temp:  [97.4 °F (36.3 °C)-97.9 °F (36.6 °C)] 97.9 °F (36.6 °C)  Pulse:  [] 99  Resp:  [22-55] 25  SpO2:  [90 %-100 %] 98 %  BP: (124-183)/(60-95) 142/81     Weight: 52.1 kg (114 lb 13.8 oz)  Body mass index is 17.46 kg/m².    Intake/Output Summary (Last 24 hours) at 9/24/2022 0839  Last data filed at 9/24/2022 0800  Gross per 24 hour   Intake 3397.55 ml   Output 1225 ml   Net 2172.55 ml      Physical Exam  Constitutional:       General: He is not in acute distress.     Appearance: He is ill-appearing. He is not toxic-appearing or diaphoretic.   Cardiovascular:      Rate and Rhythm: Regular rhythm. Tachycardia present.      Heart sounds: No murmur heard.    No gallop.   Pulmonary:      Effort: Pulmonary effort is normal. No respiratory distress.      Breath sounds: Normal breath sounds. No wheezing.   Abdominal:      General: Bowel sounds are normal. There is no distension.      Palpations: Abdomen is soft.      Tenderness: There is no abdominal tenderness.       Significant Labs: All pertinent labs within the past 24 hours have been reviewed.    Significant Imaging: I have reviewed all pertinent imaging results/findings within the past 24 hours.

## 2022-09-24 NOTE — CONSULTS
9/24- RD working remotely for weekend coverage. Consulted for DM edu. Pt remains lethargic and with AMS. Presented with BG >500. RD to attach diet edu to d/c instructions. When medically able inpatient RD to preform diet edu.     Thanks for the consult! Re-consult as needed.    Wendy Paez RD, LDN

## 2022-09-24 NOTE — ED PROVIDER NOTES
"Encounter Date: 9/23/2022    SCRIBE #1 NOTE: I, Mariah Maia, am scribing for, and in the presence of,  Jg Temple MD.     History     Chief Complaint   Patient presents with    Altered Mental Status     Presents to the ED via EMS with c/o AMS, Kussmaul Respirations, and hyperglycemia of >500. Reports recently being admitted to ICU x 1 week for DKA.      58 yo M with PMHx of IDDM, Hep C, IVDU, HTN, CHF, presents to the ED BIB EMS for altered mental status today. Per EMS, he was just hospitalized at this facility due to DKA, discharged 3 days ago. Per EMS he had medication adjustments to his DM regimen, including his insulin Lispro and Levamir when he was discharged. Since being discharged, he has been "getting confused on how to take his medications, and family members have been unsure if he has been taking them". Yesterday, his CBG at home was reported to be at 562. Today, he had begun experiencing intermittent nausea and vomiting with bile and orange in his vomitus. His sister had been watching him today, saw that he was unwell, confused. Upon EMS arrival to scene today, EMS found him confused, combative, and tachypnea but was able to communicate "a bit". His CBG en route to the ED read "HIGH". EMS further reports, on his most recent admission, he was PEC'ed after he told staff members he "did not want to do this anymore, I am tired of this". Psychiatry was consulted at the time, but his PEC was rescinded after psychiatry deemed him "just trying to seek attention". However, per EMS, family members state since he was discharged 3 days ago, he had been saying these comments once again. Patient denies any illicit drug usage today.   History limited secondary to patient's mental status.     Per chart review, he was admitted to the ICU at this facility recently for DKA. On the first night he was admitted, he became agitated and was PEC'ed.  DKA resolved and transitioned to basal bolus insulin regimen. He was only " "answer simple questions, sometimes refusing altogether. He told staff, "I feel like death."  When asked how exactly, he responds, "I don't know, just not good."  Evaluated by Psychiatry. His behaviors have slowly improved throughout his stay, and the PEC was rescinded, and he was discharged.     The history is provided by the EMS personnel and the patient.   Review of patient's allergies indicates:  No Known Allergies  Past Medical History:   Diagnosis Date    Acute on chronic pancreatitis 2016    Addiction to drug     CHF (congestive heart failure)     DKA (diabetic ketoacidoses) 2020    DM (diabetes mellitus), type 1     HCV (hepatitis C virus)     high VL     HTN (hypertension)     Hx of psychiatric care     Hypomagnesemia 2020    IVDU (intravenous drug user)     Heroin    Pancreatitis     Psychiatric problem     Substance abuse      Past Surgical History:   Procedure Laterality Date    CARDIAC SURGERY      stent placed. ([facility])    ESOPHAGOGASTRODUODENOSCOPY N/A 3/5/2020    Gastritis.  No H pylori.  Completed PPI for 1 month.  Procedure: EGD (ESOPHAGOGASTRODUODENOSCOPY);  Surgeon: Brinda Rene MD;  Location: [facility] ENDO;  Service: Endoscopy;  Laterality: N/A;  [id number] A; [phone]    LEFT HEART CATHETERIZATION Left 2020    Procedure: Left heart cath;  Surgeon: Fito Rangel MD;  Location: [facility] CATH LAB;  Service: Cardiology;  Laterality: Left;    SHOULDER SURGERY      right shoulder, spur removal.     Family History   Problem Relation Age of Onset    Asthma Mother      Social History     Tobacco Use    Smoking status: Every Day     Packs/day: 1.00     Years: 20.00     Pack years: 20.00     Types: Cigarettes     Start date: 4/3/1981     Last attempt to quit: 2020     Years since quittin.3    Smokeless tobacco: Never    Tobacco comments:     states approx 1 per day.   Substance Use Topics    Alcohol use: Not Currently     Alcohol/week: 0.0 standard drinks    Drug use: " Yes     Types: Heroin, IV, Marijuana, Amphetamines     Review of Systems   Unable to perform ROS: Mental status change     Physical Exam     Initial Vitals [09/23/22 1854]   BP Pulse Resp Temp SpO2   137/63 103 (!) 22 -- 100 %      MAP       --         Physical Exam    Nursing note and vitals reviewed.  Constitutional: He appears well-developed and well-nourished. He is not diaphoretic. He is uncooperative. He appears ill. No distress.   HENT:   Head: Normocephalic and atraumatic.   Eyes: Conjunctivae and EOM are normal. Pupils are equal, round, and reactive to light.   Neck: No JVD present.   Normal range of motion.  Cardiovascular:  Normal rate and regular rhythm.           Pulses:       Radial pulses are 2+ on the right side and 2+ on the left side.        Posterior tibial pulses are 2+ on the right side and 2+ on the left side.   Pulmonary/Chest: Breath sounds normal. Accessory muscle usage present. Tachypnea noted.   Abdominal: Abdomen is soft. Bowel sounds are normal. He exhibits no distension. There is no abdominal tenderness. There is no rebound and no guarding.   Musculoskeletal:         General: No tenderness or edema. Normal range of motion.      Cervical back: Normal range of motion.     Lymphadenopathy:     He has no cervical adenopathy.   Neurological: He is alert and oriented to person, place, and time.   Skin: Skin is warm and dry.       ED Course   Procedures  Labs Reviewed   CBC W/ AUTO DIFFERENTIAL - Abnormal; Notable for the following components:       Result Value    WBC 12.90 (*)     MCHC 30.8 (*)     Platelets 465 (*)     Immature Granulocytes 1.0 (*)     Gran # (ANC) 9.7 (*)     Immature Grans (Abs) 0.13 (*)     Mono # 1.5 (*)     Gran % 75.5 (*)     Lymph % 11.3 (*)     All other components within normal limits   COMPREHENSIVE METABOLIC PANEL - Abnormal; Notable for the following components:    Potassium 5.8 (*)     CO2 6 (*)     Glucose 642 (*)     BUN 41 (*)     Creatinine 1.9 (*)      Anion Gap 32 (*)     eGFR 41 (*)     All other components within normal limits    Narrative:     CO2 and Glu. Critical results called and verbal readback obtained   from RITA Irizarry @ 2121 on 23Sept2022. BML by BL1 09/23/2022 21:28   BETA - HYDROXYBUTYRATE, SERUM - Abnormal; Notable for the following components:    Beta-Hydroxybutyrate 1.0 (*)     All other components within normal limits   URINALYSIS, REFLEX TO URINE CULTURE - Abnormal; Notable for the following components:    Color, UA Colorless (*)     Protein, UA 1+ (*)     Glucose, UA 4+ (*)     Ketones, UA 3+ (*)     Occult Blood UA Trace (*)     All other components within normal limits    Narrative:     Specimen Source->Urine   PHOSPHORUS - Abnormal; Notable for the following components:    Phosphorus 6.8 (*)     All other components within normal limits   DRUG SCREEN PANEL, URINE EMERGENCY - Abnormal; Notable for the following components:    Amphetamine Screen, Ur Presumptive Positive (*)     Creatinine, Urine 20.2 (*)     All other components within normal limits    Narrative:     Specimen Source->Urine   POCT GLUCOSE - Abnormal; Notable for the following components:    POCT Glucose >500 (*)     All other components within normal limits   ISTAT PROCEDURE - Abnormal; Notable for the following components:    POC PH 6.995 (*)     POC PCO2 33.9 (*)     POC PO2 30 (*)     POC HCO3 8.3 (*)     POC SATURATED O2 33 (*)     POC TCO2 9 (*)     All other components within normal limits   POCT GLUCOSE - Abnormal; Notable for the following components:    POCT Glucose >500 (*)     All other components within normal limits   MAGNESIUM   URINALYSIS MICROSCOPIC    Narrative:     Specimen Source->Urine   SARS-COV-2 RDRP GENE   POCT GLUCOSE MONITORING CONTINUOUS   POCT GLUCOSE MONITORING CONTINUOUS     EKG Readings: (Independently Interpreted)   EKG obtained at 7:57 p.m. with ventricular rate of 106 normal ID QRS and QTC intervals.  No acute T-wave abnormalities.  No ST  elevation or depression to suggest ischemia.  Sinus tachycardia.     Imaging Results              X-Ray Chest AP Portable (Final result)  Result time 09/23/22 19:25:01      Final result by Linh Gordon MD (09/23/22 19:25:01)                   Impression:      No acute cardiopulmonary process identified.      Electronically signed by: Linh Gordon MD  Date:    09/23/2022  Time:    19:25               Narrative:    EXAMINATION:  XR CHEST AP PORTABLE    CLINICAL HISTORY:  hyperglycemia;    TECHNIQUE:  Single frontal view of the chest was performed.    COMPARISON:  09/17/2022.    FINDINGS:  Cardiac silhouette is normal in size.  Lungs are symmetrically expanded.  There is chronic blunting of the right costophrenic angle.  Stable nodular opacity versus nipple shadow is seen the right lower lung zone.  No evidence of new focal consolidative process, pneumothorax, or significant pleural effusion.  No acute osseous abnormality identified.                                       Medications   dextrose 50% injection 25 g (has no administration in time range)   dextrose 10 % infusion (has no administration in time range)   dextrose 50% injection 12.5 g (has no administration in time range)   dextrose 10 % infusion (has no administration in time range)   insulin regular 1 Units/mL in sodium chloride 0.9% 100 mL infusion (has no administration in time range)   sodium chloride 0.9% bolus 1,000 mL (0 mLs Intravenous Stopped 9/23/22 2037)   sodium chloride 0.9% bolus 1,000 mL (0 mLs Intravenous Stopped 9/23/22 2058)   haloperidol lactate injection 2.5 mg (2.5 mg Intravenous Given 9/23/22 2002)   sodium bicarbonate solution 50 mEq (50 mEq Intravenous Given 9/23/22 2001)     Medical Decision Making:   History:   Old Medical Records: I decided to obtain old medical records.  Initial Assessment:   58 yo M with PMHx of IDDM, Hep C, IVDU, HTN, CHF, presents to the ED BIB EMS for altered mental status today. Per EMS, he was just  hospitalized at this facility due to DKA, discharged 3 days ago.  Patient mildly agitated.  Exam with tachypnea likely secondary to severe acidosis.  Presentation consistent with DKA in the setting of not taking his insulin at home.  Will obtain labs per DKA protocol.  Will give patient normal saline given POC read of greater than 500.  Independently Interpreted Test(s):   I have ordered and independently interpreted EKG Reading(s) - see prior notes  Clinical Tests:   Lab Tests: Ordered and Reviewed  Radiological Study: Ordered and Reviewed  Medical Tests: Ordered and Reviewed        Scribe Attestation:   Scribe #1: I performed the above scribed service and the documentation accurately describes the services I performed. I attest to the accuracy of the note.      ED Course as of 09/23/22 2145   Fri Sep 23, 2022   1959 VBG with a pH of 6.99.  Give amp of sodium bicarb.  CBC with mild leukocytosis no acute anemia.  Chest x-ray without any acute abnormalities [AS]   2139 CMP with potassium of 5.8, bicarb of 6 and glucose of 642 anion gap of 32.  Given patient is acidotic ketotic and hyperglycemic with certain treatment for DKA insulin.  Will admit patient to ICU for further management.  [AS]      ED Course User Index  [AS] Jg Temple MD          Please put in 40 minutes of critical care due to patient having a high risk of  respiratory failure.   Separate from teaching and exclusive of procedure and ekg time  Includes:  Time at bedside  Time reviewing test results  Time discussing case with staff  Time documenting the medical record  Time spent with family members  Time spent with consults  Management         Clinical Impression:   Final diagnoses:  [R73.9] Hyperglycemia  [E11.10] Diabetic ketoacidosis without coma associated with type 2 diabetes mellitus (Primary)        ED Disposition Condition    Admit Stable        IJg MD, personally performed the services described in this documentation. All  medical record entries made by the scribe were at my direction and in my presence. I have reviewed the chart and agree that the record reflects my personal performance and is accurate and complete.       This dictation has been generated using M-Modal Fluency Direct dictation; some phonetic errors may occur.        Jg Temple MD  09/23/22 6004       Jg Temple MD  10/11/22 7421

## 2022-09-24 NOTE — ED NOTES
Pt uncooperative; pt refuses to roll on his back so that tech may perform EKG and for Dr to look with ultrasound for lab and 2nd IV

## 2022-09-24 NOTE — NURSING
Insulin and fluids off due to Loss of IV access on pt un able to place PIV despite multiple tries received Midline order from EICU MD for pt.picc rn at bedside placed midline insulin and fluids restarted @ 0100 insulin bolus given per md since bolus was not given in ED when gtt was initially started

## 2022-09-24 NOTE — HPI
This is a 57 year old male with a PMHx of T1DM, COPD (not on O2), CAD, HTN, HLD, substance use (including IVDU), MDD who presented with hyperglycemia and AMS.     The patient was altered and would not participate with history taking, no family was at bedside at the time of examination. Per chart review, the patient's family is unsure if he is taking his medications and reported that he gets confused about how to take them. He was reported to have intermittent nausea, vomiting and progressively became more altered. Of note, the patient was recently hospitalized ( - ) with DKA, and has a history of agitation. Last admission, he was evatlued by psychiatry for SI and was PEC'd but ultimately his PEC was rescinded as he was deemed to be seeking attention. The patient denies having any acute complaints but is very lethargic/slow to response. While in the ER, the patient was tachycardic (100s) and tachypnic (20s). Laboratory evaluation was remarkable for HAGMA (PH: 6.9, PCO2: 33.9, A), hyperglycemia (642), hyperkalemia (5.8), leukocytosis (12.9), elevated creatinine (1.9- baseline of 1.2). CXR was negative. UA was unremarkable for an infectious process but demonstrated ketonuria, glucosuria and protienuria. he was started on the DKA protocol and was admitted for further management. Of note, he patient had episodes of agitation which improved with haldol.

## 2022-09-24 NOTE — ASSESSMENT & PLAN NOTE
Nutrition consulted.   Most recent weight and BMI monitored    Measurements:  Wt Readings from Last 1 Encounters:   09/23/22 52.1 kg (114 lb 13.8 oz)   Body mass index is 17.46 kg/m².

## 2022-09-24 NOTE — PLAN OF CARE
Insulin gtt titrated per nomogram.  HCO3- gtt started @ 100 pt ph improved from 6.9 to 7.2 NS @ 125. Prn haladol given x1 VSS will continue to monitor.

## 2022-09-25 LAB
ANION GAP SERPL CALC-SCNC: 7 MMOL/L (ref 8–16)
BASOPHILS # BLD AUTO: 0.01 K/UL (ref 0–0.2)
BASOPHILS NFR BLD: 0.1 % (ref 0–1.9)
BUN SERPL-MCNC: 19 MG/DL (ref 6–20)
CALCIUM SERPL-MCNC: 8.7 MG/DL (ref 8.7–10.5)
CHLORIDE SERPL-SCNC: 110 MMOL/L (ref 95–110)
CO2 SERPL-SCNC: 24 MMOL/L (ref 23–29)
CREAT SERPL-MCNC: 1.1 MG/DL (ref 0.5–1.4)
DIFFERENTIAL METHOD: ABNORMAL
EOSINOPHIL # BLD AUTO: 0 K/UL (ref 0–0.5)
EOSINOPHIL NFR BLD: 0.5 % (ref 0–8)
ERYTHROCYTE [DISTWIDTH] IN BLOOD BY AUTOMATED COUNT: 14.8 % (ref 11.5–14.5)
EST. GFR  (NO RACE VARIABLE): >60 ML/MIN/1.73 M^2
GLUCOSE SERPL-MCNC: 209 MG/DL (ref 70–110)
HCT VFR BLD AUTO: 33.4 % (ref 40–54)
HGB BLD-MCNC: 11.4 G/DL (ref 14–18)
IMM GRANULOCYTES # BLD AUTO: 0.06 K/UL (ref 0–0.04)
IMM GRANULOCYTES NFR BLD AUTO: 0.8 % (ref 0–0.5)
LYMPHOCYTES # BLD AUTO: 1.1 K/UL (ref 1–4.8)
LYMPHOCYTES NFR BLD: 14.1 % (ref 18–48)
MAGNESIUM SERPL-MCNC: 2 MG/DL (ref 1.6–2.6)
MCH RBC QN AUTO: 26.9 PG (ref 27–31)
MCHC RBC AUTO-ENTMCNC: 34.1 G/DL (ref 32–36)
MCV RBC AUTO: 79 FL (ref 82–98)
MONOCYTES # BLD AUTO: 1.4 K/UL (ref 0.3–1)
MONOCYTES NFR BLD: 18.7 % (ref 4–15)
NEUTROPHILS # BLD AUTO: 4.9 K/UL (ref 1.8–7.7)
NEUTROPHILS NFR BLD: 65.8 % (ref 38–73)
NRBC BLD-RTO: 0 /100 WBC
PHOSPHATE SERPL-MCNC: <1 MG/DL (ref 2.7–4.5)
PLATELET # BLD AUTO: 273 K/UL (ref 150–450)
PMV BLD AUTO: 8.8 FL (ref 9.2–12.9)
POCT GLUCOSE: 153 MG/DL (ref 70–110)
POCT GLUCOSE: 165 MG/DL (ref 70–110)
POCT GLUCOSE: 210 MG/DL (ref 70–110)
POCT GLUCOSE: 25 MG/DL (ref 70–110)
POCT GLUCOSE: 27 MG/DL (ref 70–110)
POTASSIUM SERPL-SCNC: 4 MMOL/L (ref 3.5–5.1)
RBC # BLD AUTO: 4.24 M/UL (ref 4.6–6.2)
SODIUM SERPL-SCNC: 141 MMOL/L (ref 136–145)
WBC # BLD AUTO: 7.43 K/UL (ref 3.9–12.7)

## 2022-09-25 PROCEDURE — 25000003 PHARM REV CODE 250: Performed by: STUDENT IN AN ORGANIZED HEALTH CARE EDUCATION/TRAINING PROGRAM

## 2022-09-25 PROCEDURE — 83735 ASSAY OF MAGNESIUM: CPT | Performed by: STUDENT IN AN ORGANIZED HEALTH CARE EDUCATION/TRAINING PROGRAM

## 2022-09-25 PROCEDURE — 84100 ASSAY OF PHOSPHORUS: CPT | Performed by: STUDENT IN AN ORGANIZED HEALTH CARE EDUCATION/TRAINING PROGRAM

## 2022-09-25 PROCEDURE — 80048 BASIC METABOLIC PNL TOTAL CA: CPT | Performed by: INTERNAL MEDICINE

## 2022-09-25 PROCEDURE — 63600175 PHARM REV CODE 636 W HCPCS: Performed by: INTERNAL MEDICINE

## 2022-09-25 PROCEDURE — 85025 COMPLETE CBC W/AUTO DIFF WBC: CPT | Performed by: STUDENT IN AN ORGANIZED HEALTH CARE EDUCATION/TRAINING PROGRAM

## 2022-09-25 PROCEDURE — 63600175 PHARM REV CODE 636 W HCPCS: Performed by: STUDENT IN AN ORGANIZED HEALTH CARE EDUCATION/TRAINING PROGRAM

## 2022-09-25 PROCEDURE — 25000003 PHARM REV CODE 250: Performed by: CLINIC/CENTER

## 2022-09-25 PROCEDURE — 20000000 HC ICU ROOM

## 2022-09-25 RX ORDER — LORAZEPAM 0.5 MG/1
0.5 TABLET ORAL ONCE
Status: COMPLETED | OUTPATIENT
Start: 2022-09-25 | End: 2022-09-25

## 2022-09-25 RX ADMIN — ACETAMINOPHEN 650 MG: 325 TABLET ORAL at 02:09

## 2022-09-25 RX ADMIN — LORAZEPAM 0.5 MG: 0.5 TABLET ORAL at 08:09

## 2022-09-25 RX ADMIN — FAMOTIDINE 20 MG: 20 TABLET ORAL at 08:09

## 2022-09-25 RX ADMIN — INSULIN ASPART 2 UNITS: 100 INJECTION, SOLUTION INTRAVENOUS; SUBCUTANEOUS at 08:09

## 2022-09-25 RX ADMIN — ATORVASTATIN CALCIUM 20 MG: 10 TABLET, FILM COATED ORAL at 08:09

## 2022-09-25 RX ADMIN — ASPIRIN 81 MG CHEWABLE TABLET 81 MG: 81 TABLET CHEWABLE at 08:09

## 2022-09-25 RX ADMIN — INSULIN DETEMIR 40 UNITS: 100 INJECTION, SOLUTION SUBCUTANEOUS at 08:09

## 2022-09-25 RX ADMIN — MUPIROCIN: 20 OINTMENT TOPICAL at 08:09

## 2022-09-25 RX ADMIN — POTASSIUM PHOSPHATE, MONOBASIC AND POTASSIUM PHOSPHATE, DIBASIC 15 MMOL: 224; 236 INJECTION, SOLUTION, CONCENTRATE INTRAVENOUS at 08:09

## 2022-09-25 RX ADMIN — HEPARIN SODIUM 5000 UNITS: 5000 INJECTION INTRAVENOUS; SUBCUTANEOUS at 08:09

## 2022-09-25 RX ADMIN — DEXTROSE MONOHYDRATE 25 G: 25 INJECTION, SOLUTION INTRAVENOUS at 02:09

## 2022-09-25 RX ADMIN — POTASSIUM CHLORIDE, SODIUM CHLORIDE, CALCIUM CHLORIDE, SODIUM LACTATE, AND DEXTROSE MONOHYDRATE: 1.79; 6; .2; 3.1; 5 INJECTION, SOLUTION INTRAVENOUS at 04:09

## 2022-09-25 NOTE — PLAN OF CARE
"Pt refused lunchtime glucose check. When it was finally checked it was in the 20s. Pt refused glucose. Began thrashing/screaming, "Fuck you all" and saying he was going to leave. With a glucose in the 20s, refusing medical treatment, he clearly shows no capacity to make medical decisions.     PEC'd. Psychiatry consulted. Prn medications for agitation ordered.    Pt does not have medical capacity to refuse glucose checks, glucose, or insulin.   "

## 2022-09-25 NOTE — HOSPITAL COURSE
Started on ADA protocol w/ resolution of anion gap. Pt then began refusing labs. He was later found to have a glucose in the 20s. He refused dextrose therapy. He became aggressive threatening and swinging at nursing staff. He said that he did not care if he lived or . Said that he did not actually require glucose despite glucose levels in the 20.  Insulin reduced, glucose improved, continue to titrate.  Will seek inpatient psychiatric facility.  Depakote 500 mg b.i.d. initiated. Will step-down to floor.  Patient discharged to inpatient psych on 22. Activity as tolerated. Follow up prn.  Diet- ADA 2000 lexie diet

## 2022-09-25 NOTE — NURSING
Patient lying in bed appears to be asleep, but easily aroused. Refused pre lunch CBG and stating he will getting up shortly and eat. Dr. Gorman notified and aware.

## 2022-09-25 NOTE — EICU
Intervention Initiated From:  Bedside    Reyes Communicated with Bedside Nurse regarding:  Labs and Medication  requesting order for fluids & .    Doctor Notified:  Yes  Comments: Dr. Blackburn

## 2022-09-25 NOTE — PROGRESS NOTES
[facility] Medicine  Progress Note    Patient Name: Cali Mabry  MRN: [id number]  Patient Class: IP- Inpatient   Admission Date: 2022  Length of Stay: 2 days  Attending Physician: Darren Gorman MD  Primary Care Provider: Primary Doctor No        Subjective:     Principal Problem:Diabetic ketoacidosis without coma associated with type 1 diabetes mellitus        HPI:  This is a 57 year old male with a PMHx of T1DM, COPD (not on O2), CAD, HTN, HLD, substance use (including IVDU), MDD who presented with hyperglycemia and AMS.     The patient was altered and would not participate with history taking, no family was at bedside at the time of examination. Per chart review, the patient's family is unsure if he is taking his medications and reported that he gets confused about how to take them. He was reported to have intermittent nausea, vomiting and progressively became more altered. Of note, the patient was recently hospitalized ( - ) with DKA, and has a history of agitation. Last admission, he was evatlued by psychiatry for SI and was PEC'd but ultimately his PEC was rescinded as he was deemed to be seeking attention. The patient denies having any acute complaints but is very lethargic/slow to response. While in the ER, the patient was tachycardic (100s) and tachypnic (20s). Laboratory evaluation was remarkable for HAGMA (PH: 6.9, PCO2: 33.9, A), hyperglycemia (642), hyperkalemia (5.8), leukocytosis (12.9), elevated creatinine (1.9- baseline of 1.2). CXR was negative. UA was unremarkable for an infectious process but demonstrated ketonuria, glucosuria and protienuria. he was started on the DKA protocol and was admitted for further management. Of note, he patient had episodes of agitation which improved with haldol.       Overview/Hospital Course:  Started on ADA protocol w/ resolution of anion gap.       Interval History: Transitioned to subq insulin overnight.     Review  of Systems   Constitutional:  Negative for chills and fever.   Respiratory:  Negative for cough and shortness of breath.    Cardiovascular:  Negative for chest pain and leg swelling.   Gastrointestinal:  Negative for abdominal distention and abdominal pain.   Objective:     Vital Signs (Most Recent):  Temp: 98 °F (36.7 °C) (09/25/22 0715)  Pulse: 92 (09/25/22 0900)  Resp: (!) 24 (09/25/22 0900)  BP: (!) 140/66 (09/25/22 0900)  SpO2: 99 % (09/25/22 0900)   Vital Signs (24h Range):  Temp:  [97.4 °F (36.3 °C)-98.4 °F (36.9 °C)] 98 °F (36.7 °C)  Pulse:  [84-98] 92  Resp:  [19-41] 24  SpO2:  [96 %-100 %] 99 %  BP: (111-169)/(53-74) 140/66     Weight: 52.1 kg (114 lb 13.8 oz)  Body mass index is 17.46 kg/m².    Intake/Output Summary (Last 24 hours) at 9/25/2022 1059  Last data filed at 9/25/2022 0830  Gross per 24 hour   Intake 1707.99 ml   Output 2350 ml   Net -642.01 ml      Physical Exam  Constitutional:       General: He is not in acute distress.     Appearance: He is ill-appearing. He is not toxic-appearing or diaphoretic.   Cardiovascular:      Rate and Rhythm: Normal rate and regular rhythm.      Heart sounds: No murmur heard.    No gallop.   Pulmonary:      Effort: Pulmonary effort is normal. No respiratory distress.      Breath sounds: Normal breath sounds. No wheezing.   Abdominal:      General: Bowel sounds are normal. There is no distension.      Palpations: Abdomen is soft.      Tenderness: There is no abdominal tenderness.       Significant Labs: All pertinent labs within the past 24 hours have been reviewed.    Significant Imaging: I have reviewed all pertinent imaging results/findings within the past 24 hours.      Assessment/Plan:      * Diabetic ketoacidosis without coma associated with type 1 diabetes mellitus  - Patient with a history of diabetes  - Last A1c:   Lab Results   Component Value Date    HGBA1C 10.8 (H) 09/17/2022     - Labs suggestive of DKA   - Likely secondary to medication non  compliance. Less likely to be triggered by infection as his work up is negative. Leukocytosis is likely reactive.     Plan:  - DKA protocol   - Continue IVF   - Transition to injectable insulin once gap closes/patient is tolerating po intake     Substance induced mood disorder  - History of non-compliance, agitation and SI   - Agitated in the ER during this admission, improved with IV haldol     Plan:   - Haldol PRN     RAMYA (acute kidney injury)  Resolved w/ IVF    CAD (coronary artery disease)  - Cath (9/28/2020):  60% in stent restenosis of previously placed right coronary artery stent s/p balloon angioplasty. 50% stenosis noted in the mid and distal left anterior descending artery & 70 % stenosis at the apex, small caliber.  - Home medications: ASA, Lipitor     Plan:   - Resume home medications     COPD (chronic obstructive pulmonary disease)  - Reported history of COPD  - No PFTs on file  - Not on O2  - Not in exacerbation      Plan:   - Duo-Nebs PRN   - Outpatient follow up with pulmonary/PCP    Moderate protein malnutrition  Nutrition consulted.   Most recent weight and BMI monitored    Measurements:  Wt Readings from Last 1 Encounters:   09/23/22 52.1 kg (114 lb 13.8 oz)   Body mass index is 17.46 kg/m².    Chronic hepatitis C  - H/O IVDU with heroin   - Outpatient follow up with hepatology       Hyperlipidemia  - Resume Lipitor 20 mg     Essential hypertension  - History of HTN but not on active medications at this time   - Continue to monitor       VTE Risk Mitigation (From admission, onward)         Ordered     heparin (porcine) injection 5,000 Units  Every 12 hours         09/24/22 0312     Place sequential compression device  Until discontinued         09/23/22 2132     IP VTE LOW RISK PATIENT  Once         09/23/22 2132                Discharge Planning   ROLA:      Code Status: Full Code   Is the patient medically ready for discharge?:     Reason for patient still in hospital (select all that apply):  Patient trending condition, Laboratory test, Treatment, Consult recommendations and Pending disposition               Critical care time spent on the evaluation and treatment of severe organ dysfunction, review of pertinent labs and imaging studies, discussions with consulting providers and discussions with patient/family: 45 minutes.      Darren Gorman MD  Department of Hospital Medicine   [facility] [place] - Intensive Care

## 2022-09-25 NOTE — PT/OT/SLP PROGRESS
Occupational Therapy      Patient Name:  Cali Mabry   MRN:  [id number]    Attempted to evaluate patient at 11:30 AM.  Patient not seen today secondary to patient with abnormal labs (high glucose and very low phosphorus).  OT to follow-up tomorrow.      9/25/2022

## 2022-09-25 NOTE — NURSING
Pt belongings sent to security. Only pt belonging was a white shorts. Pt belongings verified by this nurse and Sahil SALINAS.

## 2022-09-25 NOTE — SUBJECTIVE & OBJECTIVE
Interval History: Transitioned to subq insulin overnight.     Review of Systems   Constitutional:  Negative for chills and fever.   Respiratory:  Negative for cough and shortness of breath.    Cardiovascular:  Negative for chest pain and leg swelling.   Gastrointestinal:  Negative for abdominal distention and abdominal pain.   Objective:     Vital Signs (Most Recent):  Temp: 98 °F (36.7 °C) (09/25/22 0715)  Pulse: 92 (09/25/22 0900)  Resp: (!) 24 (09/25/22 0900)  BP: (!) 140/66 (09/25/22 0900)  SpO2: 99 % (09/25/22 0900)   Vital Signs (24h Range):  Temp:  [97.4 °F (36.3 °C)-98.4 °F (36.9 °C)] 98 °F (36.7 °C)  Pulse:  [84-98] 92  Resp:  [19-41] 24  SpO2:  [96 %-100 %] 99 %  BP: (111-169)/(53-74) 140/66     Weight: 52.1 kg (114 lb 13.8 oz)  Body mass index is 17.46 kg/m².    Intake/Output Summary (Last 24 hours) at 9/25/2022 1059  Last data filed at 9/25/2022 0830  Gross per 24 hour   Intake 1707.99 ml   Output 2350 ml   Net -642.01 ml      Physical Exam  Constitutional:       General: He is not in acute distress.     Appearance: He is ill-appearing. He is not toxic-appearing or diaphoretic.   Cardiovascular:      Rate and Rhythm: Normal rate and regular rhythm.      Heart sounds: No murmur heard.    No gallop.   Pulmonary:      Effort: Pulmonary effort is normal. No respiratory distress.      Breath sounds: Normal breath sounds. No wheezing.   Abdominal:      General: Bowel sounds are normal. There is no distension.      Palpations: Abdomen is soft.      Tenderness: There is no abdominal tenderness.       Significant Labs: All pertinent labs within the past 24 hours have been reviewed.    Significant Imaging: I have reviewed all pertinent imaging results/findings within the past 24 hours.

## 2022-09-25 NOTE — NURSING
Recheck of CBG is 165. Dr. Gorman aware. Okay to hold pre-supper dose of insulin per md butcher. Patient eat lunch now and on 1:1 Observation. Will continue to monitor.
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
## 2022-09-25 NOTE — EICU
Anion gap closed 6 hours ago.  On insulin infusion with D5LR at 125 ml/hour  Glucose 160mg/dl.    Patient appears to be sleeping,not tachypnic or restless.    Plan:  Diet ordered.  Detemir 40 units SQ ordered.  Novolog moderate correction AC and HS.  Will discontinue insulin infusion now.

## 2022-09-25 NOTE — PLAN OF CARE
[place] - Intensive Care  Initial Discharge Assessment    Patient lives at home with mother, who helps; no current services or equipment; memory problems reported with forgetting to take medications, forgetting where he is driving to and getting loss, and incapably of keeping up with all needs of his medical care; family believes due to substance abuse, diabetes, etc. Family desires to have all medical providers through [facility] for neurology, nephrology, endocrinology, psychiatry. Possible case for out-patient case management     Primary Care Provider: Family desires coordinating care providers through [facility], including: Neurology, Nephrology, Endocrinology, Psychiatry, and Primary Care    Admission Diagnosis: Hyperglycemia [R73.9]  Diabetic ketoacidosis without coma associated with type 2 diabetes mellitus [E11.10]    Admission Date: 9/23/2022  Expected Discharge Date:     Discharge Barriers Identified: None    Payor: [facility] MCARE / Plan: [facility] DUAL COMPLETE HMO SNP / Product Type: Medicare Advantage /     Extended Emergency Contact Information  Primary Emergency Contact: Amarilis Easley   [place]  Mobile Phone: [phone]  Relation: Sister  Secondary Emergency Contact: Marilou coles  Mobile Phone: [phone]  Relation: Mother  Preferred language: English   needed? No    Discharge Plan A: Home with family (Case management to coordinate care; family wants all [facility] providers: Neurology; Nephrology; Endocrinology; and [facility]; out-patient case management)  Discharge Plan B: Other (TBD)      [id number] [place] - [address]  [address]  [place] [address]  Phone: [phone] Fax: [phone]    [facility] [address]  [address]   [place] [address]  Phone: [phone] Fax: [phone]     Initial Assessment (most recent)       Adult Discharge Assessment - 09/25/22 1257           Discharge Assessment    Assessment Type Discharge Planning Assessment     Confirmed/corrected address, phone number and insurance Yes     Confirmed Demographics Correct on Facesheet     Source of Information health record     If unable to respond/provide information was family/caregiver contacted? Other (see comments)   Kennycousin: [phone]    Reason For Admission Hyperglycemia     Lives With parent(s)     Facility Arrived From: Home     Do you expect to return to your current living situation? Yes     Do you have help at home or someone to help you manage your care at home? Yes     Who are your caregiver(s) and their phone number(s)? Marilou-mother: [phone]     Prior to hospitilization cognitive status: --   Cousin reported that he was forgetful and has poor memory    Current cognitive status: --   Refused in[facility]    Walking or Climbing Stairs Difficulty none     Dressing/Bathing Difficulty none     Home Accessibility wheelchair accessible     Home Layout Able to live on 1st floor     Equipment Currently Used at Home none     Readmission within 30 days? Yes   psych evaluated for PEC    Patient currently being followed by outpatient case management? No     Do you currently have service(s) that help you manage your care at home? No     Do you take prescription medications? Yes   kyrie Tyson reported he forgets to take--memory issues    Do you have prescription coverage? Yes     Coverage [facility] Medicare     Do you have any problems affording any of your prescribed medications? No     Is the patient taking medications as prescribed? yes   Sometimes forgets due to memory issues    Who is going to help you get home at discharge? Mother     How do you get to doctors appointments? car, drives self     Are you on dialysis? No     Do you take coumadin? No     Discharge Plan A Home with family   Case management to coordinate care; family wants all [facility] providers: Neurology; Nephrology; Endocrinology; and  [facility]; out-patient case management    Discharge Plan B Other   TBD    DME Needed Upon Discharge  other (see comments)   TBD    Discharge Plan discussed with: --   Radha    Discharge Barriers Identified None                 SW Role explained to patient; two patient identifiers recognized; SW contact information placed on Communication board. Discussed patient managing health care at home and discussed discharge plans A and B; determined who would be helping patient at home with recovery: Marilou, mother will help with recovery and other family members.

## 2022-09-25 NOTE — NURSING
[facility] Intensive Care  ICU Shift Summary  Date: 9/25/2022      Prehospitalization: Home  Admit Date / LOS : 9/23/2022/ 2 days    Diagnosis:   Diabetic ketoacidosis without coma associated with type 1 diabetes mellitus    Consults:        Active: OT, PT, RD, and SW       Needed: N/A     Code Status: Full Code   Advanced Directive: <no information>    LDA:  Lines/Drains/Airways       Drain  Duration                  Urethral Catheter 09/23/22 2300 1 day              Peripheral Intravenous Line  Duration                  Peripheral IV - Single Lumen 09/19/22 1200 22 G Anterior;Right Forearm 5 days         Midline Catheter Insertion/Assessment  - Single Lumen 09/23/22 2358 Left basilic vein (medial side of arm) other (see comments) 1 day                  Central Lines/Site/Justification:Patient Does Not Have Central Line  Urinary Cath/Order/Justification:Critically Ill in the ICU and requiring intensive monitoring    Vasopressors/Infusions:    dextrose 5% lactated ringers with KCl 20 mEq 125 mL/hr at 09/25/22 0411          GOALS: Volume/ Hemodynamic: MAP >65                     RASS: -1  awakes to voice (eye opening/contact) > 10 seconds    Pain Management: none       Pain Controlled: not applicable     Rhythm: NSR    Respiratory Device: Room Air                  Most Recent SBT/ SAT: N/A       MOVE Screen: PT Consult  ICU Liberation: not applicable    VTE Prophylaxis: Pharm  Mobility: Bedrest  Stress Ulcer Prophylaxis: Yes    Isolation: No active isolations    Dietary:   Current Diet Order   Procedures    Diet diabetic [facility]; 2000 Calorie; Isolation Tray - Regular [place]     Order Specific Question:   Indicate patient location for additional diet options:     Answer:   [facility]     Order Specific Question:   Total calories:     Answer:   2000 Calorie     Order Specific Question:   Tray type:     Answer:   Isolation Tray - Regular [place]      Tolerance: no  Advancement: Poor appetite    I & O  (24h):    Intake/Output Summary (Last 24 hours) at 9/25/2022 0602  Last data filed at 9/25/2022 0500  Gross per 24 hour   Intake 4805.54 ml   Output 2350 ml   Net 2455.54 ml        Restraints: No    Significant Dates:  Post Op Date: N/A  Rescue Date: N/A  Imaging/ Diagnostics:  See chart    Noteworthy Labs:  See below    COVID Test: (--)  CBC/Anemia Labs: Coags:    Recent Labs   Lab 09/24/22  0644 09/25/22  0536   WBC 9.19 7.43   HGB 13.0* 11.4*   HCT 42.2 33.4*    273   MCV 88 79*   RDW 14.3 14.8*    No results for input(s): PT, INR, APTT in the last 168 hours.     Chemistries:   Recent Labs   Lab 09/23/22 2040 09/23/22 2318 09/24/22  0644 09/24/22  0958 09/24/22  1808 09/24/22  2221      < > 141   < > 141 142   K 5.8*   < > 4.6   < > 3.8 4.5   CL 98   < > 109   < > 112* 111*   CO2 6*   < > 6*   < > 23 18*   BUN 41*   < > 42*   < > 28* 26*   CREATININE 1.9*   < > 1.8*   < > 1.2 1.3   CALCIUM 9.4   < > 9.1   < > 8.2* 8.5*   PROT 8.0  --   --   --   --   --    BILITOT 0.3  --   --   --   --   --    ALKPHOS 113  --   --   --   --   --    ALT 21  --   --   --   --   --    AST 16  --   --   --   --   --    MG 2.5  --  2.4  --   --   --    PHOS 6.8*  --  2.7  --   --   --     < > = values in this interval not displayed.        Cardiac Enzymes: Ejection Fractions:    No results for input(s): CPK, CPKMB, MB, TROPONINI in the last 72 hours. Nuc Rest EF   Date Value Ref Range Status   03/15/2019 53  Final        POCT Glucose: HbA1c:    Recent Labs   Lab 09/24/22 1910 09/24/22 2009 09/24/22  2114   POCTGLUCOSE 160* 129* 155*    Hemoglobin A1C   Date Value Ref Range Status   09/17/2022 10.8 (H) 4.0 - 5.6 % Final     Comment:     ADA Screening Guidelines:  5.7-6.4%  Consistent with prediabetes  >or=6.5%  Consistent with diabetes    High levels of fetal hemoglobin interfere with the HbA1C  assay. Heterozygous hemoglobin variants (HbS, HgC, etc)do  not significantly interfere with this assay.   However, presence  of multiple variants may affect accuracy.             ICU LOS 1d 7h  Level of Care: OK to Transfer    Chart Check: 12 HR Done  Shift Summary/Plan for the shift: VVS.  Insulin gtt off.  Okay to transfer

## 2022-09-26 PROBLEM — F15.20 AMPHETAMINE USE DISORDER, SEVERE, DEPENDENCE: Status: ACTIVE | Noted: 2022-09-26

## 2022-09-26 LAB
BASOPHILS # BLD AUTO: 0.01 K/UL (ref 0–0.2)
BASOPHILS NFR BLD: 0.2 % (ref 0–1.9)
DIFFERENTIAL METHOD: ABNORMAL
EOSINOPHIL # BLD AUTO: 0.1 K/UL (ref 0–0.5)
EOSINOPHIL NFR BLD: 0.9 % (ref 0–8)
ERYTHROCYTE [DISTWIDTH] IN BLOOD BY AUTOMATED COUNT: 15.1 % (ref 11.5–14.5)
HCT VFR BLD AUTO: 38.1 % (ref 40–54)
HGB BLD-MCNC: 12.1 G/DL (ref 14–18)
IMM GRANULOCYTES # BLD AUTO: 0.02 K/UL (ref 0–0.04)
IMM GRANULOCYTES NFR BLD AUTO: 0.3 % (ref 0–0.5)
LYMPHOCYTES # BLD AUTO: 1.2 K/UL (ref 1–4.8)
LYMPHOCYTES NFR BLD: 18.5 % (ref 18–48)
MAGNESIUM SERPL-MCNC: 2.1 MG/DL (ref 1.6–2.6)
MCH RBC QN AUTO: 25.8 PG (ref 27–31)
MCHC RBC AUTO-ENTMCNC: 31.8 G/DL (ref 32–36)
MCV RBC AUTO: 81 FL (ref 82–98)
MONOCYTES # BLD AUTO: 0.9 K/UL (ref 0.3–1)
MONOCYTES NFR BLD: 13.3 % (ref 4–15)
NEUTROPHILS # BLD AUTO: 4.4 K/UL (ref 1.8–7.7)
NEUTROPHILS NFR BLD: 66.8 % (ref 38–73)
NRBC BLD-RTO: 0 /100 WBC
PHOSPHATE SERPL-MCNC: 1.5 MG/DL (ref 2.7–4.5)
PLATELET # BLD AUTO: 283 K/UL (ref 150–450)
PMV BLD AUTO: 9.2 FL (ref 9.2–12.9)
POCT GLUCOSE: 234 MG/DL (ref 70–110)
POCT GLUCOSE: 319 MG/DL (ref 70–110)
POCT GLUCOSE: 43 MG/DL (ref 70–110)
POCT GLUCOSE: 44 MG/DL (ref 70–110)
POCT GLUCOSE: 59 MG/DL (ref 70–110)
POCT GLUCOSE: 72 MG/DL (ref 70–110)
POCT GLUCOSE: 98 MG/DL (ref 70–110)
RBC # BLD AUTO: 4.69 M/UL (ref 4.6–6.2)
WBC # BLD AUTO: 6.55 K/UL (ref 3.9–12.7)

## 2022-09-26 PROCEDURE — 84100 ASSAY OF PHOSPHORUS: CPT | Performed by: STUDENT IN AN ORGANIZED HEALTH CARE EDUCATION/TRAINING PROGRAM

## 2022-09-26 PROCEDURE — 36415 COLL VENOUS BLD VENIPUNCTURE: CPT | Performed by: STUDENT IN AN ORGANIZED HEALTH CARE EDUCATION/TRAINING PROGRAM

## 2022-09-26 PROCEDURE — 90792 PSYCH DIAG EVAL W/MED SRVCS: CPT | Mod: ,,, | Performed by: PSYCHIATRY & NEUROLOGY

## 2022-09-26 PROCEDURE — 25000003 PHARM REV CODE 250: Performed by: STUDENT IN AN ORGANIZED HEALTH CARE EDUCATION/TRAINING PROGRAM

## 2022-09-26 PROCEDURE — 97161 PT EVAL LOW COMPLEX 20 MIN: CPT

## 2022-09-26 PROCEDURE — 97535 SELF CARE MNGMENT TRAINING: CPT

## 2022-09-26 PROCEDURE — 90792 PR PSYCHIATRIC DIAGNOSTIC EVALUATION W/MEDICAL SERVICES: ICD-10-PCS | Mod: ,,, | Performed by: PSYCHIATRY & NEUROLOGY

## 2022-09-26 PROCEDURE — 94761 N-INVAS EAR/PLS OXIMETRY MLT: CPT

## 2022-09-26 PROCEDURE — 94760 N-INVAS EAR/PLS OXIMETRY 1: CPT

## 2022-09-26 PROCEDURE — 20000000 HC ICU ROOM

## 2022-09-26 PROCEDURE — 85025 COMPLETE CBC W/AUTO DIFF WBC: CPT | Performed by: STUDENT IN AN ORGANIZED HEALTH CARE EDUCATION/TRAINING PROGRAM

## 2022-09-26 PROCEDURE — 97165 OT EVAL LOW COMPLEX 30 MIN: CPT

## 2022-09-26 PROCEDURE — 99900035 HC TECH TIME PER 15 MIN (STAT)

## 2022-09-26 PROCEDURE — 97116 GAIT TRAINING THERAPY: CPT

## 2022-09-26 PROCEDURE — 83735 ASSAY OF MAGNESIUM: CPT | Performed by: STUDENT IN AN ORGANIZED HEALTH CARE EDUCATION/TRAINING PROGRAM

## 2022-09-26 RX ORDER — KETOROLAC TROMETHAMINE 30 MG/ML
15 INJECTION, SOLUTION INTRAMUSCULAR; INTRAVENOUS EVERY 6 HOURS PRN
Status: DISCONTINUED | OUTPATIENT
Start: 2022-09-26 | End: 2022-09-28 | Stop reason: HOSPADM

## 2022-09-26 RX ORDER — ACETAMINOPHEN 500 MG
1000 TABLET ORAL EVERY 6 HOURS PRN
Status: DISCONTINUED | OUTPATIENT
Start: 2022-09-26 | End: 2022-09-28 | Stop reason: HOSPADM

## 2022-09-26 RX ORDER — QUETIAPINE FUMARATE 25 MG/1
50 TABLET, FILM COATED ORAL NIGHTLY
Status: DISCONTINUED | OUTPATIENT
Start: 2022-09-26 | End: 2022-09-26

## 2022-09-26 RX ORDER — GUAIFENESIN 100 MG/5ML
200 SOLUTION ORAL EVERY 4 HOURS PRN
Status: DISCONTINUED | OUTPATIENT
Start: 2022-09-26 | End: 2022-09-28 | Stop reason: HOSPADM

## 2022-09-26 RX ORDER — INSULIN ASPART 100 [IU]/ML
0-5 INJECTION, SOLUTION INTRAVENOUS; SUBCUTANEOUS
Status: DISCONTINUED | OUTPATIENT
Start: 2022-09-26 | End: 2022-09-28 | Stop reason: HOSPADM

## 2022-09-26 RX ORDER — IBUPROFEN 200 MG
16 TABLET ORAL
Status: DISCONTINUED | OUTPATIENT
Start: 2022-09-26 | End: 2022-09-28 | Stop reason: HOSPADM

## 2022-09-26 RX ORDER — IBUPROFEN 200 MG
24 TABLET ORAL
Status: DISCONTINUED | OUTPATIENT
Start: 2022-09-26 | End: 2022-09-28 | Stop reason: HOSPADM

## 2022-09-26 RX ORDER — GLUCAGON 1 MG
1 KIT INJECTION
Status: DISCONTINUED | OUTPATIENT
Start: 2022-09-26 | End: 2022-09-28 | Stop reason: HOSPADM

## 2022-09-26 RX ORDER — OLANZAPINE 10 MG/2ML
10 INJECTION, POWDER, FOR SOLUTION INTRAMUSCULAR 2 TIMES DAILY PRN
Status: DISCONTINUED | OUTPATIENT
Start: 2022-09-26 | End: 2022-09-28 | Stop reason: HOSPADM

## 2022-09-26 RX ORDER — OLANZAPINE 5 MG/1
10 TABLET, ORALLY DISINTEGRATING ORAL 2 TIMES DAILY
Status: DISCONTINUED | OUTPATIENT
Start: 2022-09-26 | End: 2022-09-28 | Stop reason: HOSPADM

## 2022-09-26 RX ADMIN — INSULIN ASPART 4 UNITS: 100 INJECTION, SOLUTION INTRAVENOUS; SUBCUTANEOUS at 11:09

## 2022-09-26 RX ADMIN — GUAIFENESIN 200 MG: 100 SOLUTION ORAL at 10:09

## 2022-09-26 RX ADMIN — INSULIN ASPART 8 UNITS: 100 INJECTION, SOLUTION INTRAVENOUS; SUBCUTANEOUS at 04:09

## 2022-09-26 RX ADMIN — ACETAMINOPHEN 1000 MG: 500 TABLET ORAL at 07:09

## 2022-09-26 RX ADMIN — FAMOTIDINE 20 MG: 20 TABLET ORAL at 08:09

## 2022-09-26 RX ADMIN — OLANZAPINE 10 MG: 5 TABLET, ORALLY DISINTEGRATING ORAL at 08:09

## 2022-09-26 RX ADMIN — ASPIRIN 81 MG CHEWABLE TABLET 81 MG: 81 TABLET CHEWABLE at 09:09

## 2022-09-26 RX ADMIN — OLANZAPINE 10 MG: 5 TABLET, ORALLY DISINTEGRATING ORAL at 01:09

## 2022-09-26 NOTE — PT/OT/SLP EVAL
Physical Therapy Evaluation    Patient Name:  Cali Mabry   MRN:  [id number]    Recommendations:     Discharge Recommendations:  home health PT with family support at this time  Discharge Equipment Recommendations: none   Barriers to discharge: none    Assessment:     Cali Mabry is a 57 y.o. male admitted with a medical diagnosis of Diabetic ketoacidosis without coma associated with type 1 diabetes mellitus.  He presents with the following impairments/functional limitations:  weakness, impaired endurance, impaired sensation, impaired functional mobility, gait instability, impaired balance, decreased coordination, decreased upper extremity function, decreased lower extremity function, decreased safety awareness, pain.    Rehab Prognosis: Good; patient would benefit from acute skilled PT services to address these deficits and reach maximum level of function.    Recent Surgery: * No surgery found *      Plan:     During this hospitalization, patient to be seen 3 x/week to address the identified rehab impairments via gait training, therapeutic activities, therapeutic exercises and progress toward the following goals:    Plan of Care Expires:  10/10/22    Subjective     Chief Complaint: dizziness and pain  Patient/Family Comments/goals: Pt agreeable to ambulation in the hallway.    Pain/Comfort:  Pain Rating 1:  (Pt c/o B hip and B medial ankle pain.)  Pain Addressed 1: Reposition, Distraction, Cessation of Activity, Nurse notified      Living Environment:  Pt lives with mother, niece, and nephew in a H with no concerns.   Prior to admission, patients level of function was mod I with ambulation using SPC PRN.  Pt was driving PTA.  Pt enjoys fishing, crabbing, and hunting.  Equipment at home: cane, straight, walker, rolling.  Upon discharge, patient will have assistance from family.    Objective:     Communicated with ICU nurse Nohemy prior to session.  Patient found up in chair reclined with telemetry,  peripheral IV upon PT entry to room.    General Precautions: Standard, fall, diabetic; Pt is currently PEC'ed.    Orthopedic Precautions:N/A   Braces: N/A  Respiratory Status: Room air    Exams:  Cognitive Exam:  Patient was able to follow multiple commands.  Per chart, family reported pt with memory issues.    Gross Motor Coordination:  WFL  Postural Exam:  Patient presented with the following abnormalities:    -       No postural abnormalities identified  Sensation:    -       Intact  light/touch BLE; Pt reported h/o numbness/tingling sensation to B foot.   Skin Integrity/Edema:      -       Skin integrity: Visible skin intact  -       Edema: None noted BLE  BLE ROM: WFL  BLE Strength: WFL    Functional Mobility: Pt was cooperative, however easily agitated during PT eval.  Pt was eager to be D/C'ed home.     Transfers:     Sit to Stand:  stand by assistance and contact guard assistance with no AD  Bed to Chair: contact guard assistance with  no AD  using  Step Transfer  Gait: Pt ambulated ~200 ft with CGA using no AD.  Pt with decreased heel>toe push off, decreased step length, and decreased xiomara.   Pt required brief standing rest breaks 2* c/o dizziness, unsteadiness, and B hip pain.  Pt reaching out for counter tops around nurse's station for stability.  B hip pain increased toward end of gait.    Balance: Pt with fair dynamic standing balance.     Therapeutic Activities and Exercises:  Pt educated on supine LE therex: AP, hip abd/add, and SLR.  Pt able to demonstrate LE therex without difficulty.  Pt encouraged to perform LE therex throughout the day.  Pt verbalized understanding, however will need encouragement.      AM-PAC 6 CLICK MOBILITY  Total Score:20     Patient left up in chair reclined with all lines intact, call button in reach, and ICU nurse Nohemy present.  Tray table close by.     GOALS:   Multidisciplinary Problems       Physical Therapy Goals          Problem: Physical Therapy    Goal  Priority Disciplines Outcome Goal Variances Interventions   Physical Therapy Goal     PT, PT/OT Ongoing, Progressing     Description: Goals to be met by: 10/10/22     Patient will increase functional independence with mobility by performin. Supine to sit with Modified [place]  2. Rolling to Left and Right with Modified [place]  3. Sit to stand transfer with Modified [place]  4. Bed to chair transfer with Modified [place]   5. Gait >250 feet with Modified [place] using no AD  6. Lower extremity exercise program 2 sets x15 reps per handout, with independence                         History:     Past Medical History:   Diagnosis Date    Acute on chronic pancreatitis 2016    Addiction to drug     CHF (congestive heart failure)     DKA (diabetic ketoacidoses) 2020    DM (diabetes mellitus), type 1     HCV (hepatitis C virus)     high VL     HTN (hypertension)     Hx of psychiatric care     Hypomagnesemia 2020    IVDU (intravenous drug user)     Heroin    Pancreatitis     Psychiatric problem     Substance abuse        Past Surgical History:   Procedure Laterality Date    CARDIAC SURGERY      stent placed. ([facility])    ESOPHAGOGASTRODUODENOSCOPY N/A 3/5/2020    Gastritis.  No H pylori.  Completed PPI for 1 month.  Procedure: EGD (ESOPHAGOGASTRODUODENOSCOPY);  Surgeon: Brinda Rene MD;  Location: [facility] ENDO;  Service: Endoscopy;  Laterality: N/A;  W421 A; [id number]    LEFT HEART CATHETERIZATION Left 2020    Procedure: Left heart cath;  Surgeon: Fito Rangel MD;  Location: [facility] CATH LAB;  Service: Cardiology;  Laterality: Left;    SHOULDER SURGERY      right shoulder, spur removal.       Time Tracking:     PT Received On: 22  PT Start Time: 1007     PT Stop Time: 1030  PT Total Time (min): 23 min     Billable Minutes: Evaluation 15 min co-eval with OT and Gait Training 8 min      2022

## 2022-09-26 NOTE — CONSULTS
[facility] - Intensive Care  Psychiatry  Consult Note    Patient Name: Cali Mabry  MRN: [id number]   Code Status: Full Code  Admission Date: 9/23/2022  Hospital Length of Stay: 3 days  Attending Physician: Darren Gorman MD  Primary Care Provider: Primary Doctor No    Current Legal Status: 's Emergency Certificate (CEC)    Patient information was obtained from patient, ER records and chart review, nursing.   Inpatient consult to Psychiatry  Consult performed by: Mahamed Menendez MD  Consult ordered by: Darren Gorman MD        Subjective:     Principal Problem:Diabetic ketoacidosis without coma associated with type 1 diabetes mellitus    Chief Complaint:  Agitation; drug use     HPI: Patient Cali Mabry presents to the hospital with DKA and glucose dysregulation.  He was PEC'd for his agitation and subsequently CEC'd by the 's office.  He is known to me from a hospitalization last week in which he was aggressive and threatening, even kicking me.  He regulated with olanzapine PRNs and quetiapine.  Upon discharge, he was gone for a couple days and presented back with amphetamine positive toxicology screen.  Known history of IV meth and heroin use.  Family reported to the ED some memory loss while driving and having to be brought back home by police after getting lost.  Today, he is loud and agitated.  Curing profusely and threatening.  He throws his deodorant bottle across the room.  He refuses to engage in a civil conversation other than to scream and curse.  Although, he does have a good rapport with his nurse today.  He adamantly denies any symptoms or care for his diabetes.  Aggressively stating that he is going to leave.        Hospital Course: No notes on file         Patient History               Medical as of 9/26/2022       Past Medical History       Diagnosis Date Comments Source    Acute on chronic pancreatitis 04/03/2016 -- Provider    Addiction to drug -- -- Provider     CHF (congestive heart failure) -- -- Provider    DKA (diabetic ketoacidoses) 02/2020 -- Provider    DM (diabetes mellitus), type 1 -- -- Provider    HCV (hepatitis C virus) -- high VL  Provider    HTN (hypertension) -- -- Provider    Hx of psychiatric care -- -- Provider    Hypomagnesemia 05/25/2020 -- Provider    IVDU (intravenous drug user) -- Heroin Provider    Pancreatitis -- -- Provider    Psychiatric problem -- -- Provider    Substance abuse -- -- Provider              Pertinent Negatives       Diagnosis Date Noted Comments Source    History of psychiatric hospitalization 09/19/2022 -- Provider    Therapy 09/19/2022 -- Provider                          Surgical as of 9/26/2022       Past Surgical History       Procedure Laterality Date Comments Source    SHOULDER SURGERY -- 2013 right shoulder, spur removal. Provider    CARDIAC SURGERY -- 1999 stent placed. ([facility]) Provider    ESOPHAGOGASTRODUODENOSCOPY N/A 3/5/2020 Gastritis.  No H pylori.  Completed PPI for 1 month.  Procedure: EGD (ESOPHAGOGASTRODUODENOSCOPY);  Surgeon: Brinda Rene MD;  Location: [facility] ENDO;  Service: Endoscopy;  Laterality: N/A;  W421 A; x5445 Provider    LEFT HEART CATHETERIZATION Left 9/28/2020 Procedure: Left heart cath;  Surgeon: Fito Rangel MD;  Location: [facility] CATH LAB;  Service: Cardiology;  Laterality: Left; Provider                          Family as of 9/26/2022       Problem Relation Name Age of Onset Comments Source    Asthma Mother -- -- -- Provider                  Tobacco Use as of 9/26/2022       Smoking Status Smoking Start Date Last Attempt to Quit Smoking Frequency    Every Day 4/3/1981 6/1/2020 1 pack/day for 20.00 years (20.00 pk-yrs)      Smokeless Status Smokeless Type Smokeless Quit Date    Never -- --      Tobacco Comments    states approx 1 per day.      Source    Provider                  Alcohol Use as of 9/26/2022       Alcohol Use Drinks/Week Alcohol/Week Comments Source    Not Currently  0 Standard drinks or equivalent 0.0 standard drinks -- Provider                  Drug Use as of 9/26/2022       Drug Use Types Frequency Comments Source    Yes  Heroin, IV, Marijuana, Amphetamines -- -- Provider                  Sexual Activity as of 9/26/2022       Sexually Active Birth Control Partners Comments Source    Yes -- Female -- Provider                  Activities of Daily Living as of 9/26/2022    None               Social Documentation as of 9/26/2022    None               Occupational as of 9/26/2022    None               Socioeconomic as of 9/26/2022       Marital Status Spouse Name Number of Children Years Education Education Level Preferred Language Ethnicity Race Source    Single -- -- -- -- English Not  or /a White --                  Pertinent History       Question Response Comments    Lives with family mother    Place in Birth Order -- --    Lives in home --    Number of Siblings -- --    Raised by -- --    Legal Involvement -- --    Childhood Trauma -- --    Criminal History of -- --    Financial Status unemployed --    Highest Level of Education -- --    Does patient have access to a firearm? -- --     Service -- --    Primary Leisure Activity -- --    Spirituality -- --          Past Medical History:   Diagnosis Date    Acute on chronic pancreatitis 04/03/2016    Addiction to drug     CHF (congestive heart failure)     DKA (diabetic ketoacidoses) 02/2020    DM (diabetes mellitus), type 1     HCV (hepatitis C virus)     high VL     HTN (hypertension)     Hx of psychiatric care     Hypomagnesemia 05/25/2020    IVDU (intravenous drug user)     Heroin    Pancreatitis     Psychiatric problem     Substance abuse      Past Surgical History:   Procedure Laterality Date    CARDIAC SURGERY  1999    stent [facility]. ([facility])    ESOPHAGOGASTRODUODENOSCOPY N/A 3/5/2020    Gastritis.  No H pylori.  Completed PPI for 1 month.  Procedure: EGD  "(ESOPHAGOGASTRODUODENOSCOPY);  Surgeon: Brinda Rene MD;  Location: [facility] ENDO;  Service: Endoscopy;  Laterality: N/A;  W421 A; x5445    LEFT HEART CATHETERIZATION Left 2020    Procedure: Left heart cath;  Surgeon: Fito Rangel MD;  Location: [facility] CATH LAB;  Service: Cardiology;  Laterality: Left;    SHOULDER SURGERY      right shoulder, spur removal.     Family History       Problem Relation (Age of Onset)    Asthma Mother          Tobacco Use    Smoking status: Every Day     Packs/day: 1.00     Years: 20.00     Pack years: 20.00     Types: Cigarettes     Start date: 4/3/1981     Last attempt to quit: 2020     Years since quittin.3    Smokeless tobacco: Never    Tobacco comments:     states approx 1 per day.   Substance and Sexual Activity    Alcohol use: Not Currently     Alcohol/week: 0.0 standard drinks    Drug use: Yes     Types: Heroin, IV, Marijuana, Amphetamines    Sexual activity: Yes     Partners: Female     Review of patient's allergies indicates:  No Known Allergies    No current facility-administered medications on file prior to encounter.     Current Outpatient Medications on File Prior to Encounter   Medication Sig    aspirin 81 MG Chew Take 1 tablet (81 mg total) by mouth once daily.    atorvastatin (LIPITOR) 20 MG tablet Take 1 tablet (20 mg total) by mouth once daily.    blood sugar diagnostic Strp Use To check blood glucose three times daily,    gabapentin (NEURONTIN) 100 MG capsule Take 100 mg by mouth 3 (three) times daily.    insulin lispro 100 unit/mL pen Inject 7 Units into the skin 3 (three) times daily with meals.    insulin syringe-needle U-100 0.5 mL 31 gauge x 5/16" Syrg 1 each by Misc.(Non-Drug; Combo Route) route 3 (three) times daily.    lancets Misc To check BG 3 times daily, to use with insurance preferred meter    insulin detemir U-100 (LEVEMIR FLEXTOUCH) 100 unit/mL (3 mL) SubQ InPn pen Inject 40 Units into the skin every evening.    " "naloxone (NARCAN) 4 mg/actuation Spry 4mg by nasal route as needed for opioid overdose; may repeat every 2-3 minutes in alternating nostrils until medical help arrives. Call 911    nitroGLYCERIN (NITROSTAT) 0.4 MG SL tablet Place 1 tablet (0.4 mg total) under the tongue every 5 (five) minutes as needed for Chest pain. (Patient not taking: Reported on 9/16/2022)    pen needle, diabetic (CLICKFINE PEN NEEDLE) 32 gauge x 5/32" Ndle 1 each by Misc.(Non-Drug; Combo Route) route 4 (four) times daily before meals and nightly.    [DISCONTINUED] insulin aspart U-100 (NOVOLOG) 100 unit/mL injection Inject 1-10 Units into the skin 3 (three) times daily with meals.     Psychotherapeutics (From admission, onward)      Start     Stop Route Frequency Ordered    09/26/22 1448  OLANZapine injection 10 mg         -- IM 2 times daily PRN 09/26/22 1348    09/26/22 1400  olanzapine zydis disintegrating tablet 10 mg         -- Oral 2 times daily 09/26/22 1348    09/23/22 2325  haloperidol lactate injection 2 mg         -- IV Every 6 hours PRN 09/23/22 2225          Review of Systems   Unable to perform ROS: Psychiatric disorder   Strengths and Liabilities: Liability: Patient is impulsive., Liability: Patient has poor health., Liability: Patient has poor judgment, Liability: Patient has possible cognitive impairment., Liability: Patient lacks coping skills.    Objective:     Vital Signs (Most Recent):  Temp: 98.1 °F (36.7 °C) (09/26/22 0700)  Pulse: 87 (09/26/22 1100)  Resp: (!) 27 (09/26/22 1100)  BP: 121/66 (09/26/22 1100)  SpO2: 99 % (09/26/22 1100)   Vital Signs (24h Range):  Temp:  [98.1 °F (36.7 °C)-98.4 °F (36.9 °C)] 98.1 °F (36.7 °C)  Pulse:  [82-97] 87  Resp:  [19-27] 27  SpO2:  [97 %-100 %] 99 %  BP: (108-144)/(55-79) 121/66     Height: 5' 8" (172.7 cm)  Weight: 53.7 kg (118 lb 6.2 oz)  Body mass index is 18 kg/m².      Intake/Output Summary (Last 24 hours) at 9/26/2022 1605  Last data filed at 9/26/2022 0900  Gross per 24 " hour   Intake 980 ml   Output 1240 ml   Net -260 ml       Physical Exam  Psychiatric:      Comments: EXAMINATION    CONSTITUTIONAL  General Appearance: hospital attire    MUSCULOSKELETAL  Muscle Strength and Tone: normal  Abnormal Involuntary Movements: none noted  Gait and Station: not observed    PSYCHIATRIC MENTAL STATUS EXAM   Level of Consciousness: awake and alert  Orientation: will not answer  Grooming: poor; disheveled  Psychomotor Behavior: restless and fidgety  Speech: pressured and loud  Language: no abnormalities  Mood: will not answer  Affect: irritable; labile  Thought Process: can be linear at times  Associations: loose at times  Thought Content: will not answer; does not appear to be responding to stimuli  Memory: will not answer  Attention: distracted  Fund of Knowledge: cannot assess  Insight: poor into current medical or mental or drug use problems  Judgment: poor into redirection or cooperation with care            Significant Labs: All pertinent labs within the past 24 hours have been reviewed.    Significant Imaging: I have reviewed all pertinent imaging results/findings within the past 24 hours.    Assessment/Plan:     Amphetamine use disorder, severe, dependence  Long chronic history of drug use with little insight into this being a problem.  Not open to rehab or counseling at this time.    Substance induced mood disorder  Long chronic history of drug use with comorbid mood abnormalities, mostly irritability and agitation.  Agree with PEC/CEC and 1:1 sitter.  Seek acute inpatient psychiatric admission at this time.  Given his aggression, utilize restraints as needed for patient and staff safety.  Limit visitation from mother as this usually gets him more agitated.  Also limit his phone use so as not to cause more agitation from family.  Start olanzapine 10 mg BID, provide IM if refuses PO.  Also start Depakote 500 mg BID for mood stabilization.  Ultimately needs to obtain sobriety from drugs  for best results of care.         Total Time:  60 minutes      Mahamed Menendez MD   Psychiatry  [facility] - Intensive Care

## 2022-09-26 NOTE — NURSING
"Introduced self to pt.  Check pt's glucose at 7:20, 59.  Breakfast try at pt's BS.  Pt encouraged to eat.  Pt c/o pain in hip.  Requesting vicoden.  States if we give him anything else we can "stick it up our ass". MD notified.  Pt encouraged to eat.    "

## 2022-09-26 NOTE — PLAN OF CARE
Problem: Occupational Therapy  Goal: Occupational Therapy Goal  Description: Goals to be met by: 10/10/22    Patient will increase functional independence with ADLs by performing:    LE Dressing with Modified [place].  Grooming while standing at sink with Modified [place].  Toileting from toilet with Modified [place] for hygiene and clothing management.   Step transfer with Modified [place]  Toilet transfer to toilet with Modified [place].  Upper extremity exercise program x15 reps per handout, with independence.    Outcome: Ongoing, Progressing     OT rec HHOT with caregiver support at d/c in order to increase safety and independence with ADLs and all aspects of functional mobility.

## 2022-09-26 NOTE — ASSESSMENT & PLAN NOTE
Nutrition consulted.   Most recent weight and BMI monitored    Measurements:  Wt Readings from Last 1 Encounters:   09/26/22 53.7 kg (118 lb 6.2 oz)   Body mass index is 18 kg/m².

## 2022-09-26 NOTE — EICU
Intervention Initiated From:  Bedside    Re[place] Communicated with Bedside Nurse regarding:  Labs and Other  Pt has been off insulin drip, accuchecks are still ordered every hour but pt refuses and becomes combative. Pt was PEC'd earlier in the day d/t combativenes & not allowing staff to take care of him. His BG did drop into 20's at around 2:40 pm & required D50W IVP. Bedside RN also says pt is requesting med for sleep, melatonin is already ordered but pt declines it.  Bedside reporting last BG reading was 210      Doctor Notified:  Yes  Comments: Dr. Meier

## 2022-09-26 NOTE — SUBJECTIVE & OBJECTIVE
Patient History               Medical as of 9/26/2022       Past Medical History       Diagnosis Date Comments Source    Acute on chronic pancreatitis 04/03/2016 -- Provider    Addiction to drug -- -- Provider    CHF (congestive heart failure) -- -- Provider    DKA (diabetic ketoacidoses) 02/2020 -- Provider    DM (diabetes mellitus), type 1 -- -- Provider    HCV (hepatitis C virus) -- high VL  Provider    HTN (hypertension) -- -- Provider    Hx of psychiatric care -- -- Provider    Hypomagnesemia 05/25/2020 -- Provider    IVDU (intravenous drug user) -- Heroin Provider    Pancreatitis -- -- Provider    Psychiatric problem -- -- Provider    Substance abuse -- -- Provider              Pertinent Negatives       Diagnosis Date Noted Comments Source    History of psychiatric hospitalization 09/19/2022 -- Provider    Therapy 09/19/2022 -- Provider                          Surgical as of 9/26/2022       Past Surgical History       Procedure Laterality Date Comments Source    SHOULDER SURGERY -- 2013 right shoulder, spur removal. Provider    CARDIAC SURGERY -- 1999 stent placed. ([facility]) Provider    ESOPHAGOGASTRODUODENOSCOPY N/A 3/5/2020 Gastritis.  No H pylori.  Completed PPI for 1 month.  Procedure: EGD (ESOPHAGOGASTRODUODENOSCOPY);  Surgeon: Brinda Rene MD;  Location: [facility] ENDO;  Service: Endoscopy;  Laterality: N/A;  W421 A; [id number] Provider    LEFT HEART CATHETERIZATION Left 9/28/2020 Procedure: Left heart cath;  Surgeon: Fito Rangel MD;  Location: [facility] CATH LAB;  Service: Cardiology;  Laterality: Left; Provider                          Family as of 9/26/2022       Problem Relation Name Age of Onset Comments Source    Asthma Mother -- -- -- Provider                  Tobacco Use as of 9/26/2022       Smoking Status Smoking Start Date Last Attempt to Quit Smoking Frequency    Every Day 4/3/1981 6/1/2020 1 pack/day for 20.00 years (20.00 pk-yrs)      Smokeless Status Smokeless Type Smokeless  Quit Date    Never -- --      Tobacco Comments    states approx 1 per day.      Source    Provider                  Alcohol Use as of 9/26/2022       Alcohol Use Drinks/Week Alcohol/Week Comments Source    Not Currently 0 Standard drinks or equivalent 0.0 standard drinks -- Provider                  Drug Use as of 9/26/2022       Drug Use Types Frequency Comments Source    Yes  Heroin, IV, Marijuana, Amphetamines -- -- Provider                  Sexual Activity as of 9/26/2022       Sexually Active Birth Control Partners Comments Source    Yes -- Female -- Provider                  Activities of Daily Living as of 9/26/2022    None               Social Documentation as of 9/26/2022    None               Occupational as of 9/26/2022    None               Socioeconomic as of 9/26/2022       Marital Status Spouse Name Number of Children Years Education Education Level Preferred Language Ethnicity Race Source    Single -- -- -- -- English Not  or /a White --                  Pertinent History       Question Response Comments    Lives with family mother    Place in Birth Order -- --    Lives in home --    Number of Siblings -- --    Raised by -- --    Legal Involvement -- --    Childhood Trauma -- --    Criminal History of -- --    Financial Status unemployed --    Highest Level of Education -- --    Does patient have access to a firearm? -- --     Service -- --    Primary Leisure Activity -- --    Spirituality -- --          Past Medical History:   Diagnosis Date    Acute on chronic pancreatitis 04/03/2016    Addiction to drug     CHF (congestive heart failure)     DKA (diabetic ketoacidoses) 02/2020    DM (diabetes mellitus), type 1     HCV (hepatitis C virus)     high VL     HTN (hypertension)     Hx of psychiatric care     Hypomagnesemia 05/25/2020    IVDU (intravenous drug user)     Heroin    Pancreatitis     Psychiatric problem     Substance abuse      Past Surgical History:   Procedure  "Laterality Date    CARDIAC SURGERY      stent placed. ([facility])    ESOPHAGOGASTRODUODENOSCOPY N/A 3/5/2020    Gastritis.  No H pylori.  Completed PPI for 1 month.  Procedure: EGD (ESOPHAGOGASTRODUODENOSCOPY);  Surgeon: Brinda Rene MD;  Location: [facility] ENDO;  Service: Endoscopy;  Laterality: N/A;  W421 A; [phone]    LEFT HEART CATHETERIZATION Left 2020    Procedure: Left heart cath;  Surgeon: Fito Rangel MD;  Location: [facility] CATH LAB;  Service: Cardiology;  Laterality: Left;    SHOULDER SURGERY      right shoulder, spur removal.     Family History       Problem Relation (Age of Onset)    Asthma Mother          Tobacco Use    Smoking status: Every Day     Packs/day: 1.00     Years: 20.00     Pack years: 20.00     Types: Cigarettes     Start date: 4/3/1981     Last attempt to quit: 2020     Years since quittin.3    Smokeless tobacco: Never    Tobacco comments:     states approx 1 per day.   Substance and Sexual Activity    Alcohol use: Not Currently     Alcohol/week: 0.0 standard drinks    Drug use: Yes     Types: Heroin, IV, Marijuana, Amphetamines    Sexual activity: Yes     Partners: Female     Review of patient's allergies indicates:  No Known Allergies    No current facility-administered medications on file prior to encounter.     Current Outpatient Medications on File Prior to Encounter   Medication Sig    aspirin 81 MG Chew Take 1 tablet (81 mg total) by mouth once daily.    atorvastatin (LIPITOR) 20 MG tablet Take 1 tablet (20 mg total) by mouth once daily.    blood sugar diagnostic Strp Use To check blood glucose three times daily,    gabapentin (NEURONTIN) 100 MG capsule Take 100 mg by mouth 3 (three) times daily.    insulin lispro 100 unit/mL pen Inject 7 Units into the skin 3 (three) times daily with meals.    insulin syringe-needle U-100 0.5 mL 31 gauge x 5/16" Syrg 1 each by Misc.(Non-Drug; Combo Route) route 3 (three) times daily.    lancets Misc To check BG 3 times " "daily, to use with insurance preferred meter    insulin detemir U-100 (LEVEMIR FLEXTOUCH) 100 unit/mL (3 mL) SubQ InPn pen Inject 40 Units into the skin every evening.    naloxone (NARCAN) 4 mg/actuation Spry 4mg by nasal route as needed for opioid overdose; may repeat every 2-3 minutes in alternating nostrils until medical help arrives. Call 911    nitroGLYCERIN (NITROSTAT) 0.4 MG SL tablet Place 1 tablet (0.4 mg total) under the tongue every 5 (five) minutes as needed for Chest pain. (Patient not taking: Reported on 9/16/2022)    pen needle, diabetic (CLICKFINE PEN NEEDLE) 32 gauge x 5/32" Ndle 1 each by Misc.(Non-Drug; Combo Route) route 4 (four) times daily before meals and nightly.    [DISCONTINUED] insulin aspart U-100 (NOVOLOG) 100 unit/mL injection Inject 1-10 Units into the skin 3 (three) times daily with meals.     Psychotherapeutics (From admission, onward)      Start     Stop Route Frequency Ordered    09/26/22 1448  OLANZapine injection 10 mg         -- IM 2 times daily PRN 09/26/22 1348    09/26/22 1400  olanzapine zydis disintegrating tablet 10 mg         -- Oral 2 times daily 09/26/22 1348    09/23/22 2325  haloperidol lactate injection 2 mg         -- IV Every 6 hours PRN 09/23/22 2225          Review of Systems   Unable to perform ROS: Psychiatric disorder   Strengths and Liabilities: Liability: Patient is impulsive., Liability: Patient has poor health., Liability: Patient has poor judgment, Liability: Patient has possible cognitive impairment., Liability: Patient lacks coping skills.    Objective:     Vital Signs (Most Recent):  Temp: 98.1 °F (36.7 °C) (09/26/22 0700)  Pulse: 87 (09/26/22 1100)  Resp: (!) 27 (09/26/22 1100)  BP: 121/66 (09/26/22 1100)  SpO2: 99 % (09/26/22 1100)   Vital Signs (24h Range):  Temp:  [98.1 °F (36.7 °C)-98.4 °F (36.9 °C)] 98.1 °F (36.7 °C)  Pulse:  [82-97] 87  Resp:  [19-27] 27  SpO2:  [97 %-100 %] 99 %  BP: (108-144)/(55-79) 121/66     Height: 5' 8" (172.7 " cm)  Weight: 53.7 kg (118 lb 6.2 oz)  Body mass index is 18 kg/m².      Intake/Output Summary (Last 24 hours) at 9/26/2022 1605  Last data filed at 9/26/2022 0900  Gross per 24 hour   Intake 980 ml   Output 1240 ml   Net -260 ml       Physical Exam  Psychiatric:      Comments: EXAMINATION    CONSTITUTIONAL  General Appearance: hospital attire    MUSCULOSKELETAL  Muscle Strength and Tone: normal  Abnormal Involuntary Movements: none noted  Gait and Station: not observed    PSYCHIATRIC MENTAL STATUS EXAM   Level of Consciousness: awake and alert  Orientation: will not answer  Grooming: poor; disheveled  Psychomotor Behavior: restless and fidgety  Speech: pressured and loud  Language: no abnormalities  Mood: will not answer  Affect: irritable; labile  Thought Process: can be linear at times  Associations: loose at times  Thought Content: will not answer; does not appear to be responding to stimuli  Memory: will not answer  Attention: distracted  Fund of Knowledge: cannot assess  Insight: poor into current medical or mental or drug use problems  Judgment: poor into redirection or cooperation with care            Significant Labs: All pertinent labs within the past 24 hours have been reviewed.    Significant Imaging: I have reviewed all pertinent imaging results/findings within the past 24 hours.

## 2022-09-26 NOTE — PROGRESS NOTES
[facility] Medicine  Progress Note    Patient Name: Cali Mabry  MRN: [id number]  Patient Class: IP- Inpatient   Admission Date: 2022  Length of Stay: 3 days  Attending Physician: Darren Gorman MD  Primary Care Provider: Primary Doctor No        Subjective:     Principal Problem:Diabetic ketoacidosis without coma associated with type 1 diabetes mellitus        HPI:  This is a 57 year old male with a PMHx of T1DM, COPD (not on O2), CAD, HTN, HLD, substance use (including IVDU), MDD who presented with hyperglycemia and AMS.     The patient was altered and would not participate with history taking, no family was at bedside at the time of examination. Per chart review, the patient's family is unsure if he is taking his medications and reported that he gets confused about how to take them. He was reported to have intermittent nausea, vomiting and progressively became more altered. Of note, the patient was recently hospitalized ( - ) with DKA, and has a history of agitation. Last admission, he was evatlued by psychiatry for SI and was PEC'd but ultimately his PEC was rescinded as he was deemed to be seeking attention. The patient denies having any acute complaints but is very lethargic/slow to response. While in the ER, the patient was tachycardic (100s) and tachypnic (20s). Laboratory evaluation was remarkable for HAGMA (PH: 6.9, PCO2: 33.9, A), hyperglycemia (642), hyperkalemia (5.8), leukocytosis (12.9), elevated creatinine (1.9- baseline of 1.2). CXR was negative. UA was unremarkable for an infectious process but demonstrated ketonuria, glucosuria and protienuria. he was started on the DKA protocol and was admitted for further management. Of note, he patient had episodes of agitation which improved with haldol.       Overview/Hospital Course:  Started on ADA protocol w/ resolution of anion gap. Pt then began refusing labs. He was later found to have a glucose in the  20s. He refused dextrose therapy. He became aggressive threatening and swinging at nursing staff. He said that he did not care if he lived or . Said that he did not actually require glucose despite glucose levels in the 20.       Interval History: Yesterday pm pt PEC'd due to aggression and concern for self harm. This AM pt more calm.     Review of Systems   Constitutional:  Negative for chills and fever.   Respiratory:  Negative for cough and shortness of breath.    Cardiovascular:  Negative for chest pain and leg swelling.   Gastrointestinal:  Negative for abdominal distention and abdominal pain.   Objective:     Vital Signs (Most Recent):  Temp: 98.1 °F (36.7 °C) (22 07)  Pulse: 97 (22 07)  Resp: 20 (22)  BP: 139/74 (22)  SpO2: 99 % (22) Vital Signs (24h Range):  Temp:  [98.1 °F (36.7 °C)-98.4 °F (36.9 °C)] 98.1 °F (36.7 °C)  Pulse:  [] 97  Resp:  [19-27] 20  SpO2:  [96 %-100 %] 99 %  BP: (108-144)/(55-79) 139/74     Weight: 53.7 kg (118 lb 6.2 oz)  Body mass index is 18 kg/m².    Intake/Output Summary (Last 24 hours) at 2022 1015  Last data filed at 2022 0900  Gross per 24 hour   Intake 980 ml   Output 2290 ml   Net -1310 ml      Physical Exam  Constitutional:       General: He is not in acute distress.     Appearance: He is ill-appearing. He is not toxic-appearing or diaphoretic.   Cardiovascular:      Rate and Rhythm: Normal rate and regular rhythm.      Heart sounds: No murmur heard.    No gallop.   Pulmonary:      Effort: Pulmonary effort is normal. No respiratory distress.      Breath sounds: Normal breath sounds. No wheezing.   Abdominal:      General: Bowel sounds are normal. There is no distension.      Palpations: Abdomen is soft.      Tenderness: There is no abdominal tenderness.       Significant Labs: All pertinent labs within the past 24 hours have been reviewed.    Significant Imaging: I have reviewed all pertinent imaging  results/findings within the past 24 hours.      Assessment/Plan:      * Diabetic ketoacidosis without coma associated with type 1 diabetes mellitus  - Patient with a history of diabetes  - Last A1c:   Lab Results   Component Value Date    HGBA1C 10.8 (H) 09/17/2022     - Labs suggestive of DKA   - Likely secondary to medication non compliance. Less likely to be triggered by infection as his work up is negative. Leukocytosis is likely reactive.     Plan:  - DKA protocol   - Continue IVF       Transitioned to subq insulin. Complicated by hypoglycemia. Dose reduced. Medical care is severely challenging due to refusals/nonadherence.    Substance induced mood disorder  - History of non-compliance, agitation and SI   - Agitated in the ER during this admission, improved with IV haldol   - Amphetamine positive on ED tox screen    Pt PEC'd for the second time in two admissions for aggression and concern for self harm. Minimal understanding of medical situation. Family reports that he is increasing confused, gets lost when driving requiring police to find him and bring him home, and is increasingly forgetful, all concerning for early onset dementia in addition to his substance abuse disorder and possible personality disorder. Pt shows little understanding of his own health conditions, and his repeated admissions for severe DKA call in to question the ability for him to take care of himself.   - psychiatry consulted    RAMYA (acute kidney injury)  Resolved w/ IVF    CAD (coronary artery disease)  - Cath (9/28/2020):  60% in stent restenosis of previously placed right coronary artery stent s/p balloon angioplasty. 50% stenosis noted in the mid and distal left anterior descending artery & 70 % stenosis at the apex, small caliber.  - Home medications: ASA, Lipitor     Plan:   - Resume home medications     COPD (chronic obstructive pulmonary disease)  - Reported history of COPD  - No PFTs on file  - Not on O2  - Not in exacerbation       Plan:   - Duo-Nebs PRN   - Outpatient follow up with pulmonary/PCP    Moderate protein malnutrition  Nutrition consulted.   Most recent weight and BMI monitored    Measurements:  Wt Readings from Last 1 Encounters:   09/26/22 53.7 kg (118 lb 6.2 oz)   Body mass index is 18 kg/m².    Chronic hepatitis C  - H/O IVDU with heroin   - Outpatient follow up with hepatology       Hyperlipidemia  - Resume Lipitor 20 mg     Essential hypertension  - History of HTN but not on active medications at this time   - Continue to monitor     VTE Risk Mitigation (From admission, onward)         Ordered     heparin (porcine) injection 5,000 Units  Every 12 hours         09/24/22 0312     Place sequential compression device  Until discontinued         09/23/22 2132     IP VTE LOW RISK PATIENT  Once         09/23/22 2132                Discharge Planning   ROLA:      Code Status: Full Code   Is the patient medically ready for discharge?:     Reason for patient still in hospital (select all that apply): Patient trending condition, Laboratory test, Treatment, Consult recommendations and Pending disposition  Discharge Plan A: Home with family (Case management to coordinate care; family wants all [facility] providers: Neurology; Nephrology; Endocrinology; and [facility]; out-patient case management)            Critical care time spent on the evaluation and treatment of severe organ dysfunction, review of pertinent labs and imaging studies, discussions with consulting providers and discussions with patient/family: 45 minutes.      Darren Gorman MD  Department of Hospital Medicine   [facility] - Intensive Care

## 2022-09-26 NOTE — PROGRESS NOTES
e-ICU    Pt off insulin dripp    Will change accuchecks from q 1 hr to q 4 hrs    Last check was 9 pm---> 210    As pt is combative and refusing accuchecks will order one time Lorazepam 0.25 mg po

## 2022-09-26 NOTE — ASSESSMENT & PLAN NOTE
Long chronic history of drug use with little insight into this being a problem.  Not open to rehab or counseling at this time.

## 2022-09-26 NOTE — ASSESSMENT & PLAN NOTE
- History of non-compliance, agitation and SI   - Agitated in the ER during this admission, improved with IV haldol   - Amphetamine positive on ED tox screen    Pt PEC'd for the second time in two admissions for aggression and concern for self harm. Minimal understanding of medical situation. Family reports that he is increasing confused, gets lost when driving requiring police to find him and bring him home, and is increasingly forgetful, all concerning for early onset dementia in addition to his substance abuse disorder and possible personality disorder. Pt shows little understanding of his own health conditions, and his repeated admissions for severe DKA call in to question the ability for him to take care of himself.   - psychiatry consulted

## 2022-09-26 NOTE — ASSESSMENT & PLAN NOTE
- Patient with a history of diabetes  - Last A1c:   Lab Results   Component Value Date    HGBA1C 10.8 (H) 09/17/2022     - Labs suggestive of DKA   - Likely secondary to medication non compliance. Less likely to be triggered by infection as his work up is negative. Leukocytosis is likely reactive.     Plan:  - DKA protocol   - Continue IVF       Transitioned to subq insulin. Complicated by hypoglycemia. Dose reduced. Medical care is severely challenging due to refusals/nonadherence.

## 2022-09-26 NOTE — NURSING
Pt mother here to visit and brought him food and gator aide.  Mother was encouraged not to disturb pt as he was sleeping and peaceful.  Pt was awaken and became verbally aggressive with mother and nurse.  Pt was verbally redirected. Mother was asked to leave if pt was going to be aggressive with her.

## 2022-09-26 NOTE — SUBJECTIVE & OBJECTIVE
Interval History: Yesterday pm pt PEC'd due to aggression and concern for self harm. This AM pt more calm.     Review of Systems   Constitutional:  Negative for chills and fever.   Respiratory:  Negative for cough and shortness of breath.    Cardiovascular:  Negative for chest pain and leg swelling.   Gastrointestinal:  Negative for abdominal distention and abdominal pain.   Objective:     Vital Signs (Most Recent):  Temp: 98.1 °F (36.7 °C) (09/26/22 0700)  Pulse: 97 (09/26/22 0730)  Resp: 20 (09/26/22 0730)  BP: 139/74 (09/26/22 0730)  SpO2: 99 % (09/26/22 0730) Vital Signs (24h Range):  Temp:  [98.1 °F (36.7 °C)-98.4 °F (36.9 °C)] 98.1 °F (36.7 °C)  Pulse:  [] 97  Resp:  [19-27] 20  SpO2:  [96 %-100 %] 99 %  BP: (108-144)/(55-79) 139/74     Weight: 53.7 kg (118 lb 6.2 oz)  Body mass index is 18 kg/m².    Intake/Output Summary (Last 24 hours) at 9/26/2022 1015  Last data filed at 9/26/2022 0900  Gross per 24 hour   Intake 980 ml   Output 2290 ml   Net -1310 ml      Physical Exam  Constitutional:       General: He is not in acute distress.     Appearance: He is ill-appearing. He is not toxic-appearing or diaphoretic.   Cardiovascular:      Rate and Rhythm: Normal rate and regular rhythm.      Heart sounds: No murmur heard.    No gallop.   Pulmonary:      Effort: Pulmonary effort is normal. No respiratory distress.      Breath sounds: Normal breath sounds. No wheezing.   Abdominal:      General: Bowel sounds are normal. There is no distension.      Palpations: Abdomen is soft.      Tenderness: There is no abdominal tenderness.       Significant Labs: All pertinent labs within the past 24 hours have been reviewed.    Significant Imaging: I have reviewed all pertinent imaging results/findings within the past 24 hours.

## 2022-09-26 NOTE — PLAN OF CARE
Problem: Physical Therapy  Goal: Physical Therapy Goal  Description: Goals to be met by: 10/10/22     Patient will increase functional independence with mobility by performin. Supine to sit with Modified [place]  2. Rolling to Left and Right with Modified [place]  3. Sit to stand transfer with Modified [place]  4. Bed to chair transfer with Modified [place]   5. Gait >250 feet with Modified [place] using no AD  6. Lower extremity exercise program 2 sets x15 reps per handout, with independence    Outcome: Ongoing, Progressing     Pt ambulated ~200 ft with CGA using no AD.  Gait was limited by B hip pain today.

## 2022-09-26 NOTE — NURSING
"Pt has been verbally aggressive over the past hour.  Pt requested to have arm band cut off because "it's cutting into his skin".  No injuries noted.  Pt notified arm band could not be cut off and had to remain on.  Pt stated "get the fuck away from me then".  Pt has requested to use the phone.  Pt given opportunity to use phone and is being observed.    "

## 2022-09-26 NOTE — NURSING
1945 noticed pt would be getting 40 units of levimir this evening. He had a drop in bg this afternoon. Clarified with Dr Meier. Orders to still give.     Pt is refusing midnight  accucheck. Notified Dr. Meier. Said to reevaluate in the am.     0350  pt bg 44. Refused iv dextrose. Drank 8 oz oj instead. Will recheck in 30 minutes. AAOX4. No signs of disress noted. VSS. Recheck showed a bg of 43. Pt still refusing Dextrose. Pt ate a sandwich, 6 oz orange juice, and beef broth. New blood sugar 72. NAD noted. VSS.

## 2022-09-26 NOTE — HPI
Patient Cali Mabry presents to the hospital with DKA and glucose dysregulation.  He was PEC'd for his agitation and subsequently CEC'd by the 's office.  He is known to me from a hospitalization last week in which he was aggressive and threatening, even kicking me.  He regulated with olanzapine PRNs and quetiapine.  Upon discharge, he was gone for a couple days and presented back with amphetamine positive toxicology screen.  Known history of IV meth and heroin use.  Family reported to the ED some memory loss while driving and having to be brought back home by police after getting lost.  Today, he is loud and agitated.  Curing profusely and threatening.  He throws his deodorant bottle across the room.  He refuses to engage in a civil conversation other than to scream and curse.  Although, he does have a good rapport with his nurse today.  He adamantly denies any symptoms or care for his diabetes.  Aggressively stating that he is going to leave.

## 2022-09-26 NOTE — NURSING
Pt ate 50% of his breakfast.  He is redirectable and calm.  Pt did refuse most of his morning medication.  He was assisted to a chair and is currently sitting up watching TV.  Oral care provided.  Pt also given damp wash cloth for face and body.  Encouraged to notify nurse of needs, call light at Bs.

## 2022-09-26 NOTE — ASSESSMENT & PLAN NOTE
Long chronic history of drug use with comorbid mood abnormalities, mostly irritability and agitation.  Agree with PEC/CEC and 1:1 sitter.  Seek acute inpatient psychiatric admission at this time.  Given his aggression, utilize restraints as needed for patient and staff safety.  Limit visitation from mother as this usually gets him more agitated.  Also limit his phone use so as not to cause more agitation from family.  Start olanzapine 10 mg BID, provide IM if refuses PO.  Also start Depakote 500 mg BID for mood stabilization.  Ultimately needs to obtain sobriety from drugs for best results of care.

## 2022-09-26 NOTE — NURSING
"Pt requesting to speak to an MD for plan of care.  Dr. Islas at  explaining to pt that he will be placed in a psych facility due to frequent drug use and inability to control emotions.  PT became very irritated and threw a bottle of deodorant to the ground.  He began yelling and pounding on the table, "Get the fuck away from me".  Pt was verbally redirected, given time to debrief.  Olanzapine was ordered for pt.  Medication was explained to pt and request were made for pt to please calm down and accept taking the medication. Pt agreed.  Pt stated he took care of 3 children and if he was hospitalized there would be no one to take care of them.  Verbalized understanding to pt.    "

## 2022-09-27 LAB
ANION GAP SERPL CALC-SCNC: 10 MMOL/L (ref 8–16)
ANION GAP SERPL CALC-SCNC: 9 MMOL/L (ref 8–16)
BASOPHILS # BLD AUTO: 0.01 K/UL (ref 0–0.2)
BASOPHILS NFR BLD: 0.2 % (ref 0–1.9)
BUN SERPL-MCNC: 16 MG/DL (ref 6–20)
BUN SERPL-MCNC: 21 MG/DL (ref 6–20)
CALCIUM SERPL-MCNC: 8.5 MG/DL (ref 8.7–10.5)
CALCIUM SERPL-MCNC: 8.7 MG/DL (ref 8.7–10.5)
CHLORIDE SERPL-SCNC: 106 MMOL/L (ref 95–110)
CHLORIDE SERPL-SCNC: 108 MMOL/L (ref 95–110)
CO2 SERPL-SCNC: 23 MMOL/L (ref 23–29)
CO2 SERPL-SCNC: 23 MMOL/L (ref 23–29)
CREAT SERPL-MCNC: 1 MG/DL (ref 0.5–1.4)
CREAT SERPL-MCNC: 1.4 MG/DL (ref 0.5–1.4)
DIFFERENTIAL METHOD: ABNORMAL
EOSINOPHIL # BLD AUTO: 0.1 K/UL (ref 0–0.5)
EOSINOPHIL NFR BLD: 2.1 % (ref 0–8)
ERYTHROCYTE [DISTWIDTH] IN BLOOD BY AUTOMATED COUNT: 15 % (ref 11.5–14.5)
EST. GFR  (NO RACE VARIABLE): 59 ML/MIN/1.73 M^2
EST. GFR  (NO RACE VARIABLE): >60 ML/MIN/1.73 M^2
GLUCOSE SERPL-MCNC: 368 MG/DL (ref 70–110)
GLUCOSE SERPL-MCNC: 595 MG/DL (ref 70–110)
HCT VFR BLD AUTO: 34 % (ref 40–54)
HGB BLD-MCNC: 11 G/DL (ref 14–18)
IMM GRANULOCYTES # BLD AUTO: 0.02 K/UL (ref 0–0.04)
IMM GRANULOCYTES NFR BLD AUTO: 0.4 % (ref 0–0.5)
LYMPHOCYTES # BLD AUTO: 1.2 K/UL (ref 1–4.8)
LYMPHOCYTES NFR BLD: 23.4 % (ref 18–48)
MAGNESIUM SERPL-MCNC: 2 MG/DL (ref 1.6–2.6)
MCH RBC QN AUTO: 26.7 PG (ref 27–31)
MCHC RBC AUTO-ENTMCNC: 32.4 G/DL (ref 32–36)
MCV RBC AUTO: 83 FL (ref 82–98)
MONOCYTES # BLD AUTO: 0.5 K/UL (ref 0.3–1)
MONOCYTES NFR BLD: 9.5 % (ref 4–15)
NEUTROPHILS # BLD AUTO: 3.4 K/UL (ref 1.8–7.7)
NEUTROPHILS NFR BLD: 64.4 % (ref 38–73)
NRBC BLD-RTO: 0 /100 WBC
PHOSPHATE SERPL-MCNC: 2.3 MG/DL (ref 2.7–4.5)
PLATELET # BLD AUTO: 252 K/UL (ref 150–450)
PMV BLD AUTO: 9.4 FL (ref 9.2–12.9)
POCT GLUCOSE: 287 MG/DL (ref 70–110)
POCT GLUCOSE: 329 MG/DL (ref 70–110)
POCT GLUCOSE: 333 MG/DL (ref 70–110)
POCT GLUCOSE: 392 MG/DL (ref 70–110)
POCT GLUCOSE: 433 MG/DL (ref 70–110)
POCT GLUCOSE: >500 MG/DL (ref 70–110)
POTASSIUM SERPL-SCNC: 3.9 MMOL/L (ref 3.5–5.1)
POTASSIUM SERPL-SCNC: 4 MMOL/L (ref 3.5–5.1)
RBC # BLD AUTO: 4.12 M/UL (ref 4.6–6.2)
SODIUM SERPL-SCNC: 139 MMOL/L (ref 136–145)
SODIUM SERPL-SCNC: 140 MMOL/L (ref 136–145)
WBC # BLD AUTO: 5.25 K/UL (ref 3.9–12.7)

## 2022-09-27 PROCEDURE — 84100 ASSAY OF PHOSPHORUS: CPT | Performed by: STUDENT IN AN ORGANIZED HEALTH CARE EDUCATION/TRAINING PROGRAM

## 2022-09-27 PROCEDURE — 63600175 PHARM REV CODE 636 W HCPCS: Performed by: STUDENT IN AN ORGANIZED HEALTH CARE EDUCATION/TRAINING PROGRAM

## 2022-09-27 PROCEDURE — 25000003 PHARM REV CODE 250: Performed by: STUDENT IN AN ORGANIZED HEALTH CARE EDUCATION/TRAINING PROGRAM

## 2022-09-27 PROCEDURE — 99900035 HC TECH TIME PER 15 MIN (STAT)

## 2022-09-27 PROCEDURE — 94761 N-INVAS EAR/PLS OXIMETRY MLT: CPT

## 2022-09-27 PROCEDURE — 80048 BASIC METABOLIC PNL TOTAL CA: CPT | Performed by: STUDENT IN AN ORGANIZED HEALTH CARE EDUCATION/TRAINING PROGRAM

## 2022-09-27 PROCEDURE — 99233 PR SUBSEQUENT HOSPITAL CARE,LEVL III: ICD-10-PCS | Mod: ,,, | Performed by: PSYCHIATRY & NEUROLOGY

## 2022-09-27 PROCEDURE — 85025 COMPLETE CBC W/AUTO DIFF WBC: CPT | Performed by: STUDENT IN AN ORGANIZED HEALTH CARE EDUCATION/TRAINING PROGRAM

## 2022-09-27 PROCEDURE — 83735 ASSAY OF MAGNESIUM: CPT | Performed by: STUDENT IN AN ORGANIZED HEALTH CARE EDUCATION/TRAINING PROGRAM

## 2022-09-27 PROCEDURE — 63600175 PHARM REV CODE 636 W HCPCS: Performed by: INTERNAL MEDICINE

## 2022-09-27 PROCEDURE — 99233 SBSQ HOSP IP/OBS HIGH 50: CPT | Mod: ,,, | Performed by: PSYCHIATRY & NEUROLOGY

## 2022-09-27 PROCEDURE — 11000001 HC ACUTE MED/SURG PRIVATE ROOM

## 2022-09-27 PROCEDURE — 80048 BASIC METABOLIC PNL TOTAL CA: CPT | Mod: 91 | Performed by: STUDENT IN AN ORGANIZED HEALTH CARE EDUCATION/TRAINING PROGRAM

## 2022-09-27 RX ORDER — GABAPENTIN 100 MG/1
100 CAPSULE ORAL 2 TIMES DAILY
Status: DISCONTINUED | OUTPATIENT
Start: 2022-09-27 | End: 2022-09-28 | Stop reason: HOSPADM

## 2022-09-27 RX ORDER — INSULIN ASPART 100 [IU]/ML
4 INJECTION, SOLUTION INTRAVENOUS; SUBCUTANEOUS
Status: DISCONTINUED | OUTPATIENT
Start: 2022-09-28 | End: 2022-09-28

## 2022-09-27 RX ORDER — DIVALPROEX SODIUM 250 MG/1
500 TABLET, DELAYED RELEASE ORAL EVERY 12 HOURS
Status: DISCONTINUED | OUTPATIENT
Start: 2022-09-27 | End: 2022-09-28 | Stop reason: HOSPADM

## 2022-09-27 RX ORDER — DIVALPROEX SODIUM 250 MG/1
500 TABLET, FILM COATED, EXTENDED RELEASE ORAL DAILY
Status: DISCONTINUED | OUTPATIENT
Start: 2022-09-27 | End: 2022-09-27

## 2022-09-27 RX ADMIN — OLANZAPINE 10 MG: 5 TABLET, ORALLY DISINTEGRATING ORAL at 09:09

## 2022-09-27 RX ADMIN — FAMOTIDINE 20 MG: 20 TABLET ORAL at 09:09

## 2022-09-27 RX ADMIN — ASPIRIN 81 MG CHEWABLE TABLET 81 MG: 81 TABLET CHEWABLE at 09:09

## 2022-09-27 RX ADMIN — GUAIFENESIN 200 MG: 100 SOLUTION ORAL at 02:09

## 2022-09-27 RX ADMIN — INSULIN ASPART 3 UNITS: 100 INJECTION, SOLUTION INTRAVENOUS; SUBCUTANEOUS at 04:09

## 2022-09-27 RX ADMIN — GABAPENTIN 100 MG: 100 CAPSULE ORAL at 05:09

## 2022-09-27 RX ADMIN — DIVALPROEX SODIUM 500 MG: 250 TABLET, DELAYED RELEASE ORAL at 10:09

## 2022-09-27 RX ADMIN — INSULIN HUMAN 6 UNITS: 100 INJECTION, SOLUTION PARENTERAL at 05:09

## 2022-09-27 RX ADMIN — INSULIN ASPART 3 UNITS: 100 INJECTION, SOLUTION INTRAVENOUS; SUBCUTANEOUS at 07:09

## 2022-09-27 RX ADMIN — INSULIN HUMAN 6 UNITS: 100 INJECTION, SOLUTION PARENTERAL at 01:09

## 2022-09-27 RX ADMIN — ATORVASTATIN CALCIUM 20 MG: 10 TABLET, FILM COATED ORAL at 09:09

## 2022-09-27 RX ADMIN — INSULIN ASPART 5 UNITS: 100 INJECTION, SOLUTION INTRAVENOUS; SUBCUTANEOUS at 11:09

## 2022-09-27 RX ADMIN — INSULIN ASPART 4 UNITS: 100 INJECTION, SOLUTION INTRAVENOUS; SUBCUTANEOUS at 12:09

## 2022-09-27 RX ADMIN — KETOROLAC TROMETHAMINE 15 MG: 30 INJECTION, SOLUTION INTRAMUSCULAR at 11:09

## 2022-09-27 RX ADMIN — INSULIN ASPART 3 UNITS: 100 INJECTION, SOLUTION INTRAVENOUS; SUBCUTANEOUS at 09:09

## 2022-09-27 RX ADMIN — INSULIN HUMAN 8 UNITS: 100 INJECTION, SOLUTION PARENTERAL at 02:09

## 2022-09-27 RX ADMIN — MUPIROCIN: 20 OINTMENT TOPICAL at 09:09

## 2022-09-27 RX ADMIN — DIVALPROEX SODIUM 500 MG: 250 TABLET, DELAYED RELEASE ORAL at 09:09

## 2022-09-27 NOTE — EICU
Glucose 98<-319<-234<-44.  Ate 100% of his meal.    Received lantus 40 units SQ last night   And as per aspart moderate dose sliding scale received 8 units 4.5 hours ago in addition to 4 units SQ  5 hours prior to that.    Hypoglycemia may be due to the short acting insulin during the daytime.    Plan:  Administer lantus 25 units SQ nightly as ordered.  Will decrease Aspart ISS to low dose AC and HS.

## 2022-09-27 NOTE — SUBJECTIVE & OBJECTIVE
"Interval History: unchanged    Family History       Problem Relation (Age of Onset)    Asthma Mother          Tobacco Use    Smoking status: Every Day     Packs/day: 1.00     Years: 20.00     Pack years: 20.00     Types: Cigarettes     Start date: 4/3/1981     Last attempt to quit: 2020     Years since quittin.3    Smokeless tobacco: Never    Tobacco comments:     states approx 1 per day.   Substance and Sexual Activity    Alcohol use: Not Currently     Alcohol/week: 0.0 standard drinks    Drug use: Yes     Types: Heroin, IV, Marijuana, Amphetamines    Sexual activity: Yes     Partners: Female     Psychotherapeutics (From admission, onward)      Start     Stop Route Frequency Ordered    22 1448  OLANZapine injection 10 mg         -- IM 2 times daily PRN 22 1348    22 1400  olanzapine zydis disintegrating tablet 10 mg         -- Oral 2 times daily 22 1348    22 2325  haloperidol lactate injection 2 mg         -- IV Every 6 hours PRN 22 2225             Review of Systems   Unable to perform ROS: Psychiatric disorder   Objective:     Vital Signs (Most Recent):  Temp: 98.8 °F (37.1 °C) (22 1140)  Pulse: 97 (22 1140)  Resp: 16 (22 1140)  BP: 125/61 (22 1140)  SpO2: 97 % (22 1205) Vital Signs (24h Range):  Temp:  [98 °F (36.7 °C)-98.8 °F (37.1 °C)] 98.8 °F (37.1 °C)  Pulse:  [] 97  Resp:  [16-20] 16  SpO2:  [95 %-99 %] 97 %  BP: ()/(44-83) 125/61     Height: 5' 8" (172.7 cm)  Weight: 53.7 kg (118 lb 6.2 oz)  Body mass index is 18 kg/m².      Intake/Output Summary (Last 24 hours) at 2022 7629  Last data filed at 2022 1200  Gross per 24 hour   Intake 740 ml   Output 300 ml   Net 440 ml       Physical Exam  Psychiatric:      Comments: EXAMINATION    CONSTITUTIONAL  General Appearance: hospital attire    MUSCULOSKELETAL  Muscle Strength and Tone: normal  Abnormal Involuntary Movements: none noted  Gait and Station: not " observed    PSYCHIATRIC MENTAL STATUS EXAM   Level of Consciousness: awake and alert  Orientation: will not answer  Grooming: poor; disheveled  Psychomotor Behavior: restless and fidgety  Speech: pressured and loud  Language: no abnormalities  Mood: will not answer  Affect: irritable; labile  Thought Process: can be linear at times  Associations: loose at times  Thought Content: will not answer; does not appear to be responding to stimuli  Memory: will not answer  Attention: distracted  Fund of Knowledge: cannot assess  Insight: poor into current medical or mental or drug use problems  Judgment: poor into redirection or cooperation with care            Significant Labs: All pertinent labs within the past 24 hours have been reviewed.    Significant Imaging: I have reviewed all pertinent imaging results/findings within the past 24 hours.

## 2022-09-27 NOTE — HOSPITAL COURSE
"09/27/2022 - Patient is seen in his room.  He openly tells me to "get the fuck out" and doesn't speak with me.  He just had a loud, irritable, disruptive conversation with mother over the phone.  When I mention that we are managing his symptoms to help him from a psychiatric standpoint, he says "I ain't fucking going to no psych hospital".  Nursing reports that he has been cooperative with his medications and it has calmed him down somewhat.  Blood sugars still unregulated due to diet.    09/28/2022 - Patient seen in his room.  Still loud and irritable.  States that he is "not fucking going to a psychiatric hospital or rehab".  "Those places are for losers."  Does not wish to engage with me.  Has been accepted to psychiatric facility.  Nursing reports that he is reluctantly taking his olanzapine and Depakote but refusing vitals and insulin at times.  Yesterday, mother was escorted off by security because of negative interaction with patient.  "

## 2022-09-27 NOTE — ASSESSMENT & PLAN NOTE
Long chronic history of drug use with comorbid mood abnormalities, mostly irritability and agitation.  Agree with PEC/CEC and 1:1 sitter.  Seek acute inpatient psychiatric admission at this time.  Given his aggression, utilize restraints as needed for patient and staff safety.  Limit visitation from mother as this usually gets him more agitated.  Also limit his phone use so as not to cause more agitation from family.  Start olanzapine 10 mg BID, provide IM if refuses PO.  Also start Depakote 500 mg BID for mood stabilization.  Ultimately needs to obtain sobriety from drugs for best results of care.    9/27/22 - Continue olanzapine and Depakote.  Seek acute inpatient psychiatric care.  His mood is still dysregulated.  No more contact with mother due to irritability.

## 2022-09-27 NOTE — PLAN OF CARE
Problem: Adult Inpatient Plan of Care  Goal: Plan of Care Review  Outcome: Ongoing, Progressing  Goal: Patient-Specific Goal (Individualized)  Outcome: Ongoing, Progressing  Goal: Absence of Hospital-Acquired Illness or Injury  Outcome: Ongoing, Progressing  Goal: Optimal Comfort and Wellbeing  Outcome: Ongoing, Progressing  Goal: Readiness for Transition of Care  Outcome: Ongoing, Progressing     Problem: Diabetic Ketoacidosis  Goal: Fluid and Electrolyte Balance with Absence of Ketosis  Outcome: Ongoing, Progressing     Problem: Diabetes Comorbidity  Goal: Blood Glucose Level Within Targeted Range  Outcome: Ongoing, Progressing     Problem: Fluid and Electrolyte Imbalance (Acute Kidney Injury/Impairment)  Goal: Fluid and Electrolyte Balance  Outcome: Ongoing, Progressing     Problem: Oral Intake Inadequate (Acute Kidney Injury/Impairment)  Goal: Optimal Nutrition Intake  Outcome: Ongoing, Progressing     Problem: Renal Function Impairment (Acute Kidney Injury/Impairment)  Goal: Effective Renal Function  Outcome: Ongoing, Progressing     Problem: Infection  Goal: Absence of Infection Signs and Symptoms  Outcome: Ongoing, Progressing     Problem: Skin Injury Risk Increased  Goal: Skin Health and Integrity  Outcome: Ongoing, Progressing     Problem: Fall Injury Risk  Goal: Absence of Fall and Fall-Related Injury  Outcome: Ongoing, Progressing     Problem: Restraint, Nonbehavioral (Nonviolent)  Goal: Absence of Harm or Injury  Outcome: Ongoing, Progressing

## 2022-09-27 NOTE — PT/OT/SLP PROGRESS
"Physical Therapy      Patient Name:  Cali Mabry   MRN:  [id number]    Patient not seen today secondary to pt stated " you will have to wait, I am about to eat." . Will follow-up as able later hour/day .    "

## 2022-09-27 NOTE — PROGRESS NOTES
"[facility] - Intensive Care  Psychiatry  Progress Note    Patient Name: Cali Mabry  MRN: [id number]   Code Status: Full Code  Admission Date: 9/23/2022  Hospital Length of Stay: 4 days  Expected Discharge Date:   Attending Physician: Jose Mitchell MD  Primary Care Provider: Primary Doctor No    Current Legal Status: 's Emergency Certificate (CEC)    Patient information was obtained from patient, ER records and chart review, nursing.       Subjective:     Patient is a 57 y.o., male, presents with:    Principal Problem:Diabetic ketoacidosis without coma associated with type 1 diabetes mellitus    Chief Complaint: agitation, drug use    HPI: Patient Cali Mabry presents to the hospital with DKA and glucose dysregulation.  He was PEC'd for his agitation and subsequently CEC'd by the 's office.  He is known to me from a hospitalization last week in which he was aggressive and threatening, even kicking me.  He regulated with olanzapine PRNs and quetiapine.  Upon discharge, he was gone for a couple days and presented back with amphetamine positive toxicology screen.  Known history of IV meth and heroin use.  Family reported to the ED some memory loss while driving and having to be brought back home by police after getting lost.  Today, he is loud and agitated.  Curing profusely and threatening.  He throws his deodorant bottle across the room.  He refuses to engage in a civil conversation other than to scream and curse.  Although, he does have a good rapport with his nurse today.  He adamantly denies any symptoms or care for his diabetes.  Aggressively stating that he is going to leave.        Hospital Course: 09/27/2022 - Patient is seen in his room.  He openly tells me to "get the fuck out" and doesn't speak with me.  He just had a loud, irritable, disruptive conversation with mother over the phone.  When I mention that we are managing his symptoms to help him from a psychiatric standpoint, he says " ""I ain't fucking going to no psych hospital".  Nursing reports that he has been cooperative with his medications and it has calmed him down somewhat.  Blood sugars still unregulated due to diet.      Interval History: unchanged    Family History       Problem Relation (Age of Onset)    Asthma Mother          Tobacco Use    Smoking status: Every Day     Packs/day: 1.00     Years: 20.00     Pack years: 20.00     Types: Cigarettes     Start date: 4/3/1981     Last attempt to quit: 2020     Years since quittin.3    Smokeless tobacco: Never    Tobacco comments:     states approx 1 per day.   Substance and Sexual Activity    Alcohol use: Not Currently     Alcohol/week: 0.0 standard drinks    Drug use: Yes     Types: Heroin, IV, Marijuana, Amphetamines    Sexual activity: Yes     Partners: Female     Psychotherapeutics (From admission, onward)      Start     Stop Route Frequency Ordered    22 1448  OLANZapine injection 10 mg         -- IM 2 times daily PRN 22 1348    22 1400  olanzapine zydis disintegrating tablet 10 mg         -- Oral 2 times daily 22 1348    22 2325  haloperidol lactate injection 2 mg         -- IV Every 6 hours PRN 22 2225             Review of Systems   Unable to perform ROS: Psychiatric disorder   Objective:     Vital Signs (Most Recent):  Temp: 98.8 °F (37.1 °C) (22 1140)  Pulse: 97 (22 1140)  Resp: 16 (22 1140)  BP: 125/61 (22 1140)  SpO2: 97 % (22 1205) Vital Signs (24h Range):  Temp:  [98 °F (36.7 °C)-98.8 °F (37.1 °C)] 98.8 °F (37.1 °C)  Pulse:  [] 97  Resp:  [16-20] 16  SpO2:  [95 %-99 %] 97 %  BP: ()/(44-83) 125/61     Height: 5' 8" (172.7 cm)  Weight: 53.7 kg (118 lb 6.2 oz)  Body mass index is 18 kg/m².      Intake/Output Summary (Last 24 hours) at 2022 2347  Last data filed at 2022 1200  Gross per 24 hour   Intake 740 ml   Output 300 ml   Net 440 ml       Physical Exam  Psychiatric:      " Comments: EXAMINATION    CONSTITUTIONAL  General Appearance: hospital attire    MUSCULOSKELETAL  Muscle Strength and Tone: normal  Abnormal Involuntary Movements: none noted  Gait and Station: not observed    PSYCHIATRIC MENTAL STATUS EXAM   Level of Consciousness: awake and alert  Orientation: will not answer  Grooming: poor; disheveled  Psychomotor Behavior: restless and fidgety  Speech: pressured and loud  Language: no abnormalities  Mood: will not answer  Affect: irritable; labile  Thought Process: can be linear at times  Associations: loose at times  Thought Content: will not answer; does not appear to be responding to stimuli  Memory: will not answer  Attention: distracted  Fund of Knowledge: cannot assess  Insight: poor into current medical or mental or drug use problems  Judgment: poor into redirection or cooperation with care            Significant Labs: All pertinent labs within the past 24 hours have been reviewed.    Significant Imaging: I have reviewed all pertinent imaging results/findings within the past 24 hours.       Scheduled Medications:   aspirin  81 mg Oral Daily    atorvastatin  20 mg Oral Daily    divalproex  500 mg Oral Q12H    famotidine  20 mg Oral BID    heparin (porcine)  5,000 Units Subcutaneous Q12H    insulin detemir U-100  25 Units Subcutaneous QHS    mupirocin   Nasal BID    olanzapine zydis  10 mg Oral BID    polyethylene glycol  17 g Oral Daily       PRN Medications:  acetaminophen, albuterol-ipratropium, dextrose 10%, dextrose 10%, dextrose 50%, dextrose 50%, glucagon (human recombinant), glucose, glucose, guaiFENesin 100 mg/5 ml, haloperidol lactate, insulin aspart U-100, ketorolac, melatonin, OLANZapine, ondansetron, potassium chloride **AND** potassium chloride **AND** potassium chloride, sodium chloride 0.9%, sodium chloride 0.9%    Review of patient's allergies indicates:  No Known Allergies    Assessment/Plan:     Amphetamine use disorder, severe, dependence  Long  chronic history of drug use with little insight into this being a problem.  Not open to rehab or counseling at this time.    Substance induced mood disorder  Long chronic history of drug use with comorbid mood abnormalities, mostly irritability and agitation.  Agree with PEC/CEC and 1:1 sitter.  Seek acute inpatient psychiatric admission at this time.  Given his aggression, utilize restraints as needed for patient and staff safety.  Limit visitation from mother as this usually gets him more agitated.  Also limit his phone use so as not to cause more agitation from family.  Start olanzapine 10 mg BID, provide IM if refuses PO.  Also start Depakote 500 mg BID for mood stabilization.  Ultimately needs to obtain sobriety from drugs for best results of care.    9/27/22 - Continue olanzapine and Depakote.  Seek acute inpatient psychiatric care.  His mood is still dysregulated.  No more contact with mother due to irritability.         Need for Continued Hospitalization:  Psychiatric illness continues to pose a potential threat to life or bodily function, of self or others, thereby requiring the need for continued inpatient psychiatric hospitalization., Protective inpatient psychiatric hospitalization required while a safe disposition plan is enacted. and Requires ongoing hospitalization for stabilization of medications.    Anticipated Disposition:  Psychiatric Hospital    Total time:  35 with greater than 50% of this time spent in counseling and/or coordination of care.       Mahamed Menendez MD   Psychiatry  [facility]

## 2022-09-27 NOTE — SUBJECTIVE & OBJECTIVE
Interval History:   NAEON. Denies SOB or CP. Eating okay. UOP wnl. BM wnl.       Review of Systems   Constitutional:  Negative for chills and fever.   Respiratory:  Negative for cough and shortness of breath.    Cardiovascular:  Negative for chest pain and leg swelling.   Gastrointestinal:  Negative for abdominal distention and abdominal pain.   Objective:     Vital Signs (Most Recent):  Temp: 98.6 °F (37 °C) (09/27/22 0729)  Pulse: 101 (09/27/22 0729)  Resp: 20 (09/27/22 0729)  BP: 99/64 (09/27/22 0729)  SpO2: 96 % (09/27/22 0729) Vital Signs (24h Range):  Temp:  [98 °F (36.7 °C)-98.6 °F (37 °C)] 98.6 °F (37 °C)  Pulse:  [] 101  Resp:  [18-27] 20  SpO2:  [95 %-99 %] 96 %  BP: ()/(44-83) 99/64     Weight: 53.7 kg (118 lb 6.2 oz)  Body mass index is 18 kg/m².    Intake/Output Summary (Last 24 hours) at 9/27/2022 1015  Last data filed at 9/27/2022 0800  Gross per 24 hour   Intake 240 ml   Output --   Net 240 ml        Physical Exam  Vitals and nursing note reviewed.   Constitutional:       General: He is not in acute distress.     Appearance: He is well-developed. He is ill-appearing. He is not toxic-appearing or diaphoretic.   HENT:      Head: Normocephalic and atraumatic.   Eyes:      General: No scleral icterus.     Pupils: Pupils are equal, round, and reactive to light.   Neck:      Thyroid: No thyromegaly.   Cardiovascular:      Rate and Rhythm: Normal rate and regular rhythm.      Heart sounds: No murmur heard.    No gallop.   Pulmonary:      Effort: Pulmonary effort is normal. No respiratory distress.      Breath sounds: Normal breath sounds. No stridor. No wheezing or rales.   Abdominal:      General: Bowel sounds are normal. There is no distension.      Palpations: Abdomen is soft.      Tenderness: There is no abdominal tenderness.   Musculoskeletal:         General: No deformity. Normal range of motion.      Cervical back: Normal range of motion. No rigidity.   Lymphadenopathy:      Cervical: No  cervical adenopathy.   Skin:     General: Skin is warm.      Capillary Refill: Capillary refill takes less than 2 seconds.      Coloration: Skin is not jaundiced.      Findings: No bruising.   Neurological:      Mental Status: He is alert.      Comments: Not participating in neuro exam           Recent Results (from the past 24 hour(s))   POCT glucose    Collection Time: 09/26/22 11:28 AM   Result Value Ref Range    POCT Glucose 234 (H) 70 - 110 mg/dL   POCT glucose    Collection Time: 09/26/22  4:39 PM   Result Value Ref Range    POCT Glucose 319 (H) 70 - 110 mg/dL   POCT glucose    Collection Time: 09/26/22  8:48 PM   Result Value Ref Range    POCT Glucose 98 70 - 110 mg/dL   POCT glucose    Collection Time: 09/27/22 12:12 AM   Result Value Ref Range    POCT Glucose 329 (H) 70 - 110 mg/dL   POCT glucose    Collection Time: 09/27/22  4:15 AM   Result Value Ref Range    POCT Glucose 333 (H) 70 - 110 mg/dL   Phosphorus    Collection Time: 09/27/22  4:27 AM   Result Value Ref Range    Phosphorus 2.3 (L) 2.7 - 4.5 mg/dL   CBC auto differential    Collection Time: 09/27/22  4:27 AM   Result Value Ref Range    WBC 5.25 3.90 - 12.70 K/uL    RBC 4.12 (L) 4.60 - 6.20 M/uL    Hemoglobin 11.0 (L) 14.0 - 18.0 g/dL    Hematocrit 34.0 (L) 40.0 - 54.0 %    MCV 83 82 - 98 fL    MCH 26.7 (L) 27.0 - 31.0 pg    MCHC 32.4 32.0 - 36.0 g/dL    RDW 15.0 (H) 11.5 - 14.5 %    Platelets 252 150 - 450 K/uL    MPV 9.4 9.2 - 12.9 fL    Immature Granulocytes 0.4 0.0 - 0.5 %    Gran # (ANC) 3.4 1.8 - 7.7 K/uL    Immature Grans (Abs) 0.02 0.00 - 0.04 K/uL    Lymph # 1.2 1.0 - 4.8 K/uL    Mono # 0.5 0.3 - 1.0 K/uL    Eos # 0.1 0.0 - 0.5 K/uL    Baso # 0.01 0.00 - 0.20 K/uL    nRBC 0 0 /100 WBC    Gran % 64.4 38.0 - 73.0 %    Lymph % 23.4 18.0 - 48.0 %    Mono % 9.5 4.0 - 15.0 %    Eosinophil % 2.1 0.0 - 8.0 %    Basophil % 0.2 0.0 - 1.9 %    Differential Method Automated    Magnesium    Collection Time: 09/27/22  4:27 AM   Result Value Ref Range     Magnesium 2.0 1.6 - 2.6 mg/dL   Basic metabolic panel    Collection Time: 09/27/22  4:27 AM   Result Value Ref Range    Sodium 140 136 - 145 mmol/L    Potassium 4.0 3.5 - 5.1 mmol/L    Chloride 108 95 - 110 mmol/L    CO2 23 23 - 29 mmol/L    Glucose 368 (H) 70 - 110 mg/dL    BUN 16 6 - 20 mg/dL    Creatinine 1.0 0.5 - 1.4 mg/dL    Calcium 8.5 (L) 8.7 - 10.5 mg/dL    Anion Gap 9 8 - 16 mmol/L    eGFR >60 >60 mL/min/1.73 m^2       Microbiology Results (last 7 days)       ** No results found for the last 168 hours. **             Imaging Results              X-Ray Chest AP Portable (Final result)  Result time 09/23/22 19:25:01      Final result by Linh Gordon MD (09/23/22 19:25:01)                   Impression:      No acute cardiopulmonary process identified.      Electronically signed by: Linh Gordon MD  Date:    09/23/2022  Time:    19:25               Narrative:    EXAMINATION:  XR CHEST AP PORTABLE    CLINICAL HISTORY:  hyperglycemia;    TECHNIQUE:  Single frontal view of the chest was performed.    COMPARISON:  09/17/2022.    FINDINGS:  Cardiac silhouette is normal in size.  Lungs are symmetrically expanded.  There is chronic blunting of the right costophrenic angle.  Stable nodular opacity versus nipple shadow is seen the right lower lung zone.  No evidence of new focal consolidative process, pneumothorax, or significant pleural effusion.  No acute osseous abnormality identified.

## 2022-09-27 NOTE — EICU
Intervention Initiated From:  Bedside    Reyes Communicated with Bedside Nurse regarding:  Labs and Medication  Pt is due for 40 units of levemir but his BG was 98 after eating dinner & last night his BG dropped to 44 during the night.    Doctor Notified:  Yes  Comments: Dr. Blackburn

## 2022-09-27 NOTE — PROGRESS NOTES
[facility] Medicine  Progress Note    Patient Name: Cali Mabry  MRN: [id number]  Patient Class: IP- Inpatient   Admission Date: 2022  Length of Stay: 4 days  Attending Physician: Joes Mitchell MD  Primary Care Provider: Primary Doctor No        Subjective:     Principal Problem:Diabetic ketoacidosis without coma associated with type 1 diabetes mellitus        HPI:  This is a 57 year old male with a PMHx of T1DM, COPD (not on O2), CAD, HTN, HLD, substance use (including IVDU), MDD who presented with hyperglycemia and AMS.     The patient was altered and would not participate with history taking, no family was at bedside at the time of examination. Per chart review, the patient's family is unsure if he is taking his medications and reported that he gets confused about how to take them. He was reported to have intermittent nausea, vomiting and progressively became more altered. Of note, the patient was recently hospitalized ( - ) with DKA, and has a history of agitation. Last admission, he was evatlued by psychiatry for SI and was PEC'd but ultimately his PEC was rescinded as he was deemed to be seeking attention. The patient denies having any acute complaints but is very lethargic/slow to response. While in the ER, the patient was tachycardic (100s) and tachypnic (20s). Laboratory evaluation was remarkable for HAGMA (PH: 6.9, PCO2: 33.9, A), hyperglycemia (642), hyperkalemia (5.8), leukocytosis (12.9), elevated creatinine (1.9- baseline of 1.2). CXR was negative. UA was unremarkable for an infectious process but demonstrated ketonuria, glucosuria and protienuria. he was started on the DKA protocol and was admitted for further management. Of note, he patient had episodes of agitation which improved with haldol.       Overview/Hospital Course:  Started on ADA protocol w/ resolution of anion gap. Pt then began refusing labs. He was later found to have a glucose in the 20s.  He refused dextrose therapy. He became aggressive threatening and swinging at nursing staff. He said that he did not care if he lived or . Said that he did not actually require glucose despite glucose levels in the 20.  Insulin reduced, glucose improved, continue to titrate.  Will seek inpatient psychiatric facility.  Depakote 500 mg b.i.d. initiated. Will step-down to floor.       Interval History:   NAEON. Denies SOB or CP. Eating okay. UOP wnl. BM wnl.       Review of Systems   Constitutional:  Negative for chills and fever.   Respiratory:  Negative for cough and shortness of breath.    Cardiovascular:  Negative for chest pain and leg swelling.   Gastrointestinal:  Negative for abdominal distention and abdominal pain.   Objective:     Vital Signs (Most Recent):  Temp: 98.6 °F (37 °C) (22)  Pulse: 101 (22)  Resp: 20 (22)  BP: 99/64 (22)  SpO2: 96 % (22) Vital Signs (24h Range):  Temp:  [98 °F (36.7 °C)-98.6 °F (37 °C)] 98.6 °F (37 °C)  Pulse:  [] 101  Resp:  [18-27] 20  SpO2:  [95 %-99 %] 96 %  BP: ()/(44-83) 99/64     Weight: 53.7 kg (118 lb 6.2 oz)  Body mass index is 18 kg/m².    Intake/Output Summary (Last 24 hours) at 2022 1015  Last data filed at 2022 0800  Gross per 24 hour   Intake 240 ml   Output --   Net 240 ml        Physical Exam  Vitals and nursing note reviewed.   Constitutional:       General: He is not in acute distress.     Appearance: He is well-developed. He is ill-appearing. He is not toxic-appearing or diaphoretic.   HENT:      Head: Normocephalic and atraumatic.   Eyes:      General: No scleral icterus.     Pupils: Pupils are equal, round, and reactive to light.   Neck:      Thyroid: No thyromegaly.   Cardiovascular:      Rate and Rhythm: Normal rate and regular rhythm.      Heart sounds: No murmur heard.    No gallop.   Pulmonary:      Effort: Pulmonary effort is normal. No respiratory distress.      Breath  sounds: Normal breath sounds. No stridor. No wheezing or rales.   Abdominal:      General: Bowel sounds are normal. There is no distension.      Palpations: Abdomen is soft.      Tenderness: There is no abdominal tenderness.   Musculoskeletal:         General: No deformity. Normal range of motion.      Cervical back: Normal range of motion. No rigidity.   Lymphadenopathy:      Cervical: No cervical adenopathy.   Skin:     General: Skin is warm.      Capillary Refill: Capillary refill takes less than 2 seconds.      Coloration: Skin is not jaundiced.      Findings: No bruising.   Neurological:      Mental Status: He is alert.      Comments: Not participating in neuro exam           Recent Results (from the past 24 hour(s))   POCT glucose    Collection Time: 09/26/22 11:28 AM   Result Value Ref Range    POCT Glucose 234 (H) 70 - 110 mg/dL   POCT glucose    Collection Time: 09/26/22  4:39 PM   Result Value Ref Range    POCT Glucose 319 (H) 70 - 110 mg/dL   POCT glucose    Collection Time: 09/26/22  8:48 PM   Result Value Ref Range    POCT Glucose 98 70 - 110 mg/dL   POCT glucose    Collection Time: 09/27/22 12:12 AM   Result Value Ref Range    POCT Glucose 329 (H) 70 - 110 mg/dL   POCT glucose    Collection Time: 09/27/22  4:15 AM   Result Value Ref Range    POCT Glucose 333 (H) 70 - 110 mg/dL   Phosphorus    Collection Time: 09/27/22  4:27 AM   Result Value Ref Range    Phosphorus 2.3 (L) 2.7 - 4.5 mg/dL   CBC auto differential    Collection Time: 09/27/22  4:27 AM   Result Value Ref Range    WBC 5.25 3.90 - 12.70 K/uL    RBC 4.12 (L) 4.60 - 6.20 M/uL    Hemoglobin 11.0 (L) 14.0 - 18.0 g/dL    Hematocrit 34.0 (L) 40.0 - 54.0 %    MCV 83 82 - 98 fL    MCH 26.7 (L) 27.0 - 31.0 pg    MCHC 32.4 32.0 - 36.0 g/dL    RDW 15.0 (H) 11.5 - 14.5 %    Platelets 252 150 - 450 K/uL    MPV 9.4 9.2 - 12.9 fL    Immature Granulocytes 0.4 0.0 - 0.5 %    Gran # (ANC) 3.4 1.8 - 7.7 K/uL    Immature Grans (Abs) 0.02 0.00 - 0.04 K/uL     Lymph # 1.2 1.0 - 4.8 K/uL    Mono # 0.5 0.3 - 1.0 K/uL    Eos # 0.1 0.0 - 0.5 K/uL    Baso # 0.01 0.00 - 0.20 K/uL    nRBC 0 0 /100 WBC    Gran % 64.4 38.0 - 73.0 %    Lymph % 23.4 18.0 - 48.0 %    Mono % 9.5 4.0 - 15.0 %    Eosinophil % 2.1 0.0 - 8.0 %    Basophil % 0.2 0.0 - 1.9 %    Differential Method Automated    Magnesium    Collection Time: 09/27/22  4:27 AM   Result Value Ref Range    Magnesium 2.0 1.6 - 2.6 mg/dL   Basic metabolic panel    Collection Time: 09/27/22  4:27 AM   Result Value Ref Range    Sodium 140 136 - 145 mmol/L    Potassium 4.0 3.5 - 5.1 mmol/L    Chloride 108 95 - 110 mmol/L    CO2 23 23 - 29 mmol/L    Glucose 368 (H) 70 - 110 mg/dL    BUN 16 6 - 20 mg/dL    Creatinine 1.0 0.5 - 1.4 mg/dL    Calcium 8.5 (L) 8.7 - 10.5 mg/dL    Anion Gap 9 8 - 16 mmol/L    eGFR >60 >60 mL/min/1.73 m^2       Microbiology Results (last 7 days)       ** No results found for the last 168 hours. **             Imaging Results              X-Ray Chest AP Portable (Final result)  Result time 09/23/22 19:25:01      Final result by Linh Gordon MD (09/23/22 19:25:01)                   Impression:      No acute cardiopulmonary process identified.      Electronically signed by: Linh Gordon MD  Date:    09/23/2022  Time:    19:25               Narrative:    EXAMINATION:  XR CHEST AP PORTABLE    CLINICAL HISTORY:  hyperglycemia;    TECHNIQUE:  Single frontal view of the chest was performed.    COMPARISON:  09/17/2022.    FINDINGS:  Cardiac silhouette is normal in size.  Lungs are symmetrically expanded.  There is chronic blunting of the right costophrenic angle.  Stable nodular opacity versus nipple shadow is seen the right lower lung zone.  No evidence of new focal consolidative process, pneumothorax, or significant pleural effusion.  No acute osseous abnormality identified.                                            Assessment/Plan:      * Diabetic ketoacidosis without coma associated with type 1 diabetes  mellitus  - Patient with a history of diabetes  - Last A1c:   Lab Results   Component Value Date    HGBA1C 10.8 (H) 09/17/2022     - Labs suggestive of DKA   - Likely secondary to medication non compliance. Less likely to be triggered by infection as his work up is negative. Leukocytosis is likely reactive.     Plan:  - DKA protocol   - Continue IVF       Transitioned to subq insulin. Complicated by hypoglycemia. Dose reduced. Medical care is severely challenging due to refusals/nonadherence.    Substance induced mood disorder  - History of non-compliance, agitation and SI   - Agitated in the ER during this admission, improved with IV haldol   - Amphetamine positive on ED tox screen    Pt PEC'd for the second time in two admissions for aggression and concern for self harm. Minimal understanding of medical situation. Family reports that he is increasing confused, gets lost when driving requiring police to find him and bring him home, and is increasingly forgetful, all concerning for early onset dementia in addition to his substance abuse disorder and possible personality disorder. Pt shows little understanding of his own health conditions, and his repeated admissions for severe DKA call in to question the ability for him to take care of himself.   - psychiatry consulted    RAMYA (acute kidney injury)  Resolved w/ IVF    CAD (coronary artery disease)  - Cath (9/28/2020):  60% in stent restenosis of previously placed right coronary artery stent s/p balloon angioplasty. 50% stenosis noted in the mid and distal left anterior descending artery & 70 % stenosis at the apex, small caliber.  - Home medications: ASA, Lipitor     Plan:   - Resume home medications     COPD (chronic obstructive pulmonary disease)  - Reported history of COPD  - No PFTs on file  - Not on O2  - Not in exacerbation      Plan:   - Duo-Nebs PRN   - Outpatient follow up with pulmonary/PCP    Moderate protein malnutrition  Nutrition consulted.   Most  recent weight and BMI monitored    Measurements:  Wt Readings from Last 1 Encounters:   09/26/22 53.7 kg (118 lb 6.2 oz)   Body mass index is 18 kg/m².    Chronic hepatitis C  - H/O IVDU with heroin   - Outpatient follow up with hepatology       Hyperlipidemia  - Resume Lipitor 20 mg     Essential hypertension  - History of HTN but not on active medications at this time   - Continue to monitor       VTE Risk Mitigation (From admission, onward)         Ordered     heparin (porcine) injection 5,000 Units  Every 12 hours         09/24/22 0312     Place sequential compression device  Until discontinued         09/23/22 2132     IP VTE LOW RISK PATIENT  Once         09/23/22 2132                Discharge Planning   ROLA:      Code Status: Full Code   Is the patient medically ready for discharge?:     Reason for patient still in hospital (select all that apply): Patient trending condition and Treatment  Discharge Plan A: Home with family (Case management to coordinate care; family wants all [facility] providers: Neurology; Nephrology; Endocrinology; and [facility]; out-patient case management)            Critical care time spent on the evaluation and treatment of severe organ dysfunction, review of pertinent labs and imaging studies, discussions with consulting providers and discussions with patient/family: 35 minutes.      Jose Mitchell MD  Department of Hospital Medicine   [facility] [place] [facility] [facility]

## 2022-09-27 NOTE — PROVIDER TRANSFER
Started on ADA protocol w/ resolution of anion gap. Pt then began refusing labs. He was later found to have a glucose in the 20s. He refused dextrose therapy. He became aggressive threatening and swinging at nursing staff. He said that he did not care if he lived or . Said that he did not actually require glucose despite glucose levels in the 20.  Insulin reduced, glucose improved, continue to titrate.      Will seek inpatient psychiatric facility.    Depakote 500 mg b.i.d. initiated.   Labile glucoses, adjusted to 28U long, 4U TID wm and SSI but may need to adjust tomorrow        Jose Mitchell MD  Internal Medicine Staff

## 2022-09-27 NOTE — NURSING
[facility]  Nurses Note -- 4 Eyes      9/27/2022       Skin assessed on: Q Shift      [x] No Pressure Injuries Present    [x]Prevention Measures Documented    [] Yes LDA  for Pressure Injury Previously documented     [] Yes New Pressure Injury Discovered   [] LDA for New Pressure Injury Added      Attending RN:  Ginette Sterling RN     Second RN:  RITA PATRICK

## 2022-09-28 VITALS
DIASTOLIC BLOOD PRESSURE: 65 MMHG | HEIGHT: 68 IN | OXYGEN SATURATION: 98 % | TEMPERATURE: 98 F | BODY MASS INDEX: 17.94 KG/M2 | SYSTOLIC BLOOD PRESSURE: 118 MMHG | WEIGHT: 118.38 LBS | HEART RATE: 105 BPM | RESPIRATION RATE: 20 BRPM

## 2022-09-28 LAB
BASOPHILS # BLD AUTO: 0.02 K/UL (ref 0–0.2)
BASOPHILS NFR BLD: 0.3 % (ref 0–1.9)
DIFFERENTIAL METHOD: ABNORMAL
EOSINOPHIL # BLD AUTO: 0.2 K/UL (ref 0–0.5)
EOSINOPHIL NFR BLD: 2.8 % (ref 0–8)
ERYTHROCYTE [DISTWIDTH] IN BLOOD BY AUTOMATED COUNT: 14.7 % (ref 11.5–14.5)
HCT VFR BLD AUTO: 36.9 % (ref 40–54)
HGB BLD-MCNC: 11.6 G/DL (ref 14–18)
IMM GRANULOCYTES # BLD AUTO: 0.04 K/UL (ref 0–0.04)
IMM GRANULOCYTES NFR BLD AUTO: 0.5 % (ref 0–0.5)
LYMPHOCYTES # BLD AUTO: 1.3 K/UL (ref 1–4.8)
LYMPHOCYTES NFR BLD: 17.4 % (ref 18–48)
MAGNESIUM SERPL-MCNC: 1.7 MG/DL (ref 1.6–2.6)
MCH RBC QN AUTO: 26.1 PG (ref 27–31)
MCHC RBC AUTO-ENTMCNC: 31.4 G/DL (ref 32–36)
MCV RBC AUTO: 83 FL (ref 82–98)
MONOCYTES # BLD AUTO: 0.6 K/UL (ref 0.3–1)
MONOCYTES NFR BLD: 8.4 % (ref 4–15)
NEUTROPHILS # BLD AUTO: 5.2 K/UL (ref 1.8–7.7)
NEUTROPHILS NFR BLD: 70.6 % (ref 38–73)
NRBC BLD-RTO: 0 /100 WBC
PHOSPHATE SERPL-MCNC: 3.2 MG/DL (ref 2.7–4.5)
PLATELET # BLD AUTO: 316 K/UL (ref 150–450)
PMV BLD AUTO: 9.5 FL (ref 9.2–12.9)
POCT GLUCOSE: 258 MG/DL (ref 70–110)
POCT GLUCOSE: 347 MG/DL (ref 70–110)
RBC # BLD AUTO: 4.45 M/UL (ref 4.6–6.2)
WBC # BLD AUTO: 7.4 K/UL (ref 3.9–12.7)

## 2022-09-28 PROCEDURE — 85025 COMPLETE CBC W/AUTO DIFF WBC: CPT | Performed by: STUDENT IN AN ORGANIZED HEALTH CARE EDUCATION/TRAINING PROGRAM

## 2022-09-28 PROCEDURE — 99233 PR SUBSEQUENT HOSPITAL CARE,LEVL III: ICD-10-PCS | Mod: ,,, | Performed by: PSYCHIATRY & NEUROLOGY

## 2022-09-28 PROCEDURE — 99233 SBSQ HOSP IP/OBS HIGH 50: CPT | Mod: ,,, | Performed by: PSYCHIATRY & NEUROLOGY

## 2022-09-28 PROCEDURE — 36415 COLL VENOUS BLD VENIPUNCTURE: CPT | Performed by: STUDENT IN AN ORGANIZED HEALTH CARE EDUCATION/TRAINING PROGRAM

## 2022-09-28 PROCEDURE — 99900035 HC TECH TIME PER 15 MIN (STAT)

## 2022-09-28 PROCEDURE — 25000003 PHARM REV CODE 250: Performed by: STUDENT IN AN ORGANIZED HEALTH CARE EDUCATION/TRAINING PROGRAM

## 2022-09-28 PROCEDURE — 83735 ASSAY OF MAGNESIUM: CPT | Performed by: STUDENT IN AN ORGANIZED HEALTH CARE EDUCATION/TRAINING PROGRAM

## 2022-09-28 PROCEDURE — 63600175 PHARM REV CODE 636 W HCPCS: Performed by: STUDENT IN AN ORGANIZED HEALTH CARE EDUCATION/TRAINING PROGRAM

## 2022-09-28 PROCEDURE — 84100 ASSAY OF PHOSPHORUS: CPT | Performed by: STUDENT IN AN ORGANIZED HEALTH CARE EDUCATION/TRAINING PROGRAM

## 2022-09-28 PROCEDURE — 92610 EVALUATE SWALLOWING FUNCTION: CPT

## 2022-09-28 RX ORDER — INSULIN ASPART 100 [IU]/ML
10 INJECTION, SOLUTION INTRAVENOUS; SUBCUTANEOUS
Status: DISCONTINUED | OUTPATIENT
Start: 2022-09-28 | End: 2022-09-28 | Stop reason: HOSPADM

## 2022-09-28 RX ADMIN — INSULIN ASPART 4 UNITS: 100 INJECTION, SOLUTION INTRAVENOUS; SUBCUTANEOUS at 12:09

## 2022-09-28 RX ADMIN — GABAPENTIN 100 MG: 100 CAPSULE ORAL at 09:09

## 2022-09-28 RX ADMIN — INSULIN ASPART 5 UNITS: 100 INJECTION, SOLUTION INTRAVENOUS; SUBCUTANEOUS at 12:09

## 2022-09-28 RX ADMIN — FAMOTIDINE 20 MG: 20 TABLET ORAL at 09:09

## 2022-09-28 RX ADMIN — OLANZAPINE 10 MG: 5 TABLET, ORALLY DISINTEGRATING ORAL at 09:09

## 2022-09-28 RX ADMIN — ASPIRIN 81 MG CHEWABLE TABLET 81 MG: 81 TABLET CHEWABLE at 09:09

## 2022-09-28 RX ADMIN — INSULIN ASPART 3 UNITS: 100 INJECTION, SOLUTION INTRAVENOUS; SUBCUTANEOUS at 09:09

## 2022-09-28 RX ADMIN — ATORVASTATIN CALCIUM 20 MG: 10 TABLET, FILM COATED ORAL at 09:09

## 2022-09-28 RX ADMIN — DIVALPROEX SODIUM 500 MG: 250 TABLET, DELAYED RELEASE ORAL at 09:09

## 2022-09-28 RX ADMIN — MUPIROCIN: 20 OINTMENT TOPICAL at 09:09

## 2022-09-28 RX ADMIN — INSULIN ASPART 4 UNITS: 100 INJECTION, SOLUTION INTRAVENOUS; SUBCUTANEOUS at 08:09

## 2022-09-28 NOTE — PT/OT/SLP EVAL
"Speech Language Pathology Evaluation  Bedside Swallow    Patient Name:  Cali Mabry   MRN:  [id number]  Admitting Diagnosis: Diabetic ketoacidosis without coma associated with type 1 diabetes mellitus    Recommendations:                 General Recommendations:  GI evaluation (outpatient if complaints persist)  Diet recommendations:  Regular, Thin   Aspiration Precautions: Alternating bites/sips, HOB to 90 degrees, and Small bites/sips   General Precautions: Standard, aspiration  Communication strategies:  none    History:     Past Medical History:   Diagnosis Date    Acute on chronic pancreatitis 04/03/2016    Addiction to drug     CHF (congestive heart failure)     DKA (diabetic ketoacidoses) 02/2020    DM (diabetes mellitus), type 1     HCV (hepatitis C virus)     high VL     HTN (hypertension)     Hx of psychiatric care     Hypomagnesemia 05/25/2020    IVDU (intravenous drug user)     Heroin    Pancreatitis     Psychiatric problem     Substance abuse        Past Surgical History:   Procedure Laterality Date    CARDIAC SURGERY  1999    stent placed. ([facility])    ESOPHAGOGASTRODUODENOSCOPY N/A 3/5/2020    Gastritis.  No H pylori.  Completed PPI for 1 month.  Procedure: EGD (ESOPHAGOGASTRODUODENOSCOPY);  Surgeon: Brinda Rene MD;  Location: [facility] ENDO;  Service: Endoscopy;  Laterality: N/A;  W421 A; [phone]    LEFT HEART CATHETERIZATION Left 9/28/2020    Procedure: Left heart cath;  Surgeon: Fito Rangel MD;  Location: [facility] CATH LAB;  Service: Cardiology;  Laterality: Left;    SHOULDER SURGERY  2013    right shoulder, spur removal.     Social History: Patient (I) with ADLs    Chest X-Rays: 9/23/22 No acute cardiopulmonary process identified.    Prior diet: unrestricted    Subjective   "Those are some of my favorite things" in reference to grainy foods like rice and grits. Pt requesting not to change diet despite earlier regurgitation of partially masticated solids. Pt denies issues swallowing " "liquids reports periodic sensation of solids "getting stuck" and indicating approximate level of UES. Per report frequent episodes of n/v prior to admit.   Patient goals: continue unrestricted diet    Pain/Comfort:  Pain Rating 1: 0/10    Respiratory Status: Room air    Objective:     Oral Musculature Evaluation  Oral Musculature: WFL  Dentition: edentulous  Secretion Management: adequate  Mucosal Quality: good  Mandibular Strength and Mobility: WFL  Oral Labial Strength and Mobility: WFL  Lingual Strength and Mobility: WFL  Velar Elevation: WFL  Buccal Strength and Mobility: WFL    Bedside Swallow Eval:   Consistencies Assessed:  Thin liquids ~4oz via straw  Puree X3  Solids X1 cracker      Oral Phase:   Prolonged mastication    Pharyngeal Phase:   no overt clinical signs/symptoms of pharyngeal dysphagia  Delayed brief cough unclear if related to po intake     Compensatory Strategies  Alternate bites small bites and sips    Treatment: please note silent aspiration cannot be r/o at bedside.     Assessment:     Cali Mabry is a 57 y.o. male with dx of Diabetic ketoacidosis without coma associated with type 1 diabetes mellitus he presents with mild oropharyngeal dysphagia with likely esophageal component.     Goals:   Multidisciplinary Problems       SLP Goals          Problem: SLP    Goal Priority Disciplines Outcome   SLP Goal    Low SLP Ongoing, Progressing   Description: STG  1. Pt will alternate small bites and sips during self feeding.   2. Pt will tolerate regular with thin liquids without overt s/s of aspiration utilizing safe swallow strategies.                        Plan:     Patient to be seen:  2 x/week   Plan of Care expires:  10/06/22  Plan of Care reviewed with:  patient   SLP Follow-Up:  Yes       Discharge recommendations:    consider GI consult  Barriers to Discharge:  None    Time Tracking:     SLP Treatment Date:   09/28/22  Speech Start Time:  1355  Speech Stop Time:  1410     Speech Total " Time (min):  15 min    Billable Minutes: Eval Swallow and Oral Function 15    09/28/2022

## 2022-09-28 NOTE — PROGRESS NOTES
"Pt called on call light yelling " I am choking" upon entering room pt sitting up in bed with lunch in front of him, coughing. Pt was able to cough up some rice. O2 sat on RA 98%. Lung sounds are clear. Pt then coughed up a little more rice. Pt sat up on side of bed and coughed up a piece of cabbage. Pt states he fells much better and is now asking for a fork to eat his dessert. Pt is no longer coughing. Does not appear to be in distress at this time. 1:1 sitter remains at bedside. Spoon provided. Natasha Jones LPALMAZ informed and is notifying Dr Dickinson.   "

## 2022-09-28 NOTE — NURSING
Patient arrived to floor by wheelchair via hospital transporter and security from ICU.  Patient transferred to bed independently without difficulty.  AAOX4.  Patient was oriented to room, information on whiteboard, and medication regimen.  Bed low, adequate lighting provided, side rails x2 up, call bell within reach. Patient denied sob, pain, and  having any acute distress at this time.  None observed.  Will continue to monitor and follow treatment plan.  Safety sitter at bedside.

## 2022-09-28 NOTE — DISCHARGE SUMMARY
[facility]  Discharge Summary      Patient Name: Cali Mabry  MRN: [id number]  Patient Class: IP- Inpatient  Admission Date: 2022  Hospital Length of Stay: 5 days  Discharge Date and Time:  2022 3:09 PM  Attending Physician: Tyrese Dickinson MD   Discharging Provider: Tyrese Dickinson MD  Primary Care Provider: Primary Doctor No      HPI:   This is a 57 year old male with a PMHx of T1DM, COPD (not on O2), CAD, HTN, HLD, substance use (including IVDU), MDD who presented with hyperglycemia and AMS.     The patient was altered and would not participate with history taking, no family was at bedside at the time of examination. Per chart review, the patient's family is unsure if he is taking his medications and reported that he gets confused about how to take them. He was reported to have intermittent nausea, vomiting and progressively became more altered. Of note, the patient was recently hospitalized ( - ) with DKA, and has a history of agitation. Last admission, he was evatlued by psychiatry for SI and was PEC'd but ultimately his PEC was rescinded as he was deemed to be seeking attention. The patient denies having any acute complaints but is very lethargic/slow to response. While in the ER, the patient was tachycardic (100s) and tachypnic (20s). Laboratory evaluation was remarkable for HAGMA (PH: 6.9, PCO2: 33.9, A), hyperglycemia (642), hyperkalemia (5.8), leukocytosis (12.9), elevated creatinine (1.9- baseline of 1.2). CXR was negative. UA was unremarkable for an infectious process but demonstrated ketonuria, glucosuria and protienuria. he was started on the DKA protocol and was admitted for further management. Of note, he patient had episodes of agitation which improved with haldol.       * No surgery found *      Hospital Course:   Started on ADA protocol w/ resolution of anion gap. Pt then began refusing labs. He was later found to have a glucose in  the 20s. He refused dextrose therapy. He became aggressive threatening and swinging at nursing staff. He said that he did not care if he lived or . Said that he did not actually require glucose despite glucose levels in the 20.  Insulin reduced, glucose improved, continue to titrate.  Will seek inpatient psychiatric facility.  Depakote 500 mg b.i.d. initiated. Will step-down to floor.  Patient discharged to inpatient psych on 22. Activity as tolerated. Follow up prn.  Diet- ADA 2000 lexie diet          Goals of Care Treatment Preferences:  Code Status: Full Code      Consults:   Consults (From admission, onward)        Status Ordering Provider     Inpatient consult to Psychiatry  Once        Provider:  Mahamed Menendez MD    Completed CARY HERNANDEZ     Inpatient consult to Midline team  Once        Provider:  (Not yet assigned)    Acknowledged CARY HERNANDEZ     Inpatient consult to Registered Dietitian/Nutritionist  Once        Provider:  (Not yet assigned)    Completed MAI ISIDRO          No new Assessment & Plan notes have been filed under this hospital service since the last note was generated.  Service: Hospital Medicine    Final Active Diagnoses:    Diagnosis Date Noted POA    PRINCIPAL PROBLEM:  Diabetic ketoacidosis without coma associated with type 1 diabetes mellitus [E10.10] 2021 Yes    COPD (chronic obstructive pulmonary disease) [J44.9] 2020 Yes    Amphetamine use disorder, severe, dependence [F15.20] 2022 Yes    Substance induced mood disorder [F19.94] 2022 Yes    RAMYA (acute kidney injury) [N17.9] 2022 Yes    CAD (coronary artery disease) [I25.10] 2020 Yes     Chronic    Moderate protein malnutrition [E44.0] 2020 Yes    Hyperlipidemia [E78.5] 2017 Yes     Chronic    Chronic hepatitis C [B18.2] 2017 Yes     Chronic    Essential hypertension [I10] 2016 Yes     Chronic      Problems Resolved During this Admission:  "      Discharged Condition: good    Disposition: Psychiatric Hospital    Follow Up:    Patient Instructions:   No discharge procedures on file.    Significant Diagnostic Studies:     Pending Diagnostic Studies:     None         Medications:  Reconciled Home Medications:      Medication List      CONTINUE taking these medications    aspirin 81 MG Chew  Take 1 tablet (81 mg total) by mouth once daily.     atorvastatin 20 MG tablet  Commonly known as: LIPITOR  Take 1 tablet (20 mg total) by mouth once daily.     BD ULTRA-FINE GERTRUDIS PEN NEEDLE 32 gauge x 5/32" Ndle  Generic drug: pen needle, diabetic  1 each by Misc.(Non-Drug; Combo Route) route 4 (four) times daily before meals and nightly.     blood sugar diagnostic Strp  Use To check blood glucose three times daily,     gabapentin 100 MG capsule  Commonly known as: NEURONTIN  Take 100 mg by mouth 3 (three) times daily.     insulin lispro 100 unit/mL pen  Inject 7 Units into the skin 3 (three) times daily with meals.     insulin syringe-needle U-100 0.5 mL 31 gauge x 5/16" Syrg  1 each by Misc.(Non-Drug; Combo Route) route 3 (three) times daily.     lancets Misc  To check BG 3 times daily, to use with insurance preferred meter     LEVEMIR FLEXTOUCH U-100 INSULN 100 unit/mL (3 mL) Inpn pen  Generic drug: insulin detemir U-100  Inject 40 Units into the skin every evening.     naloxone 4 mg/actuation Spry  Commonly known as: NARCAN  4mg by nasal route as needed for opioid overdose; may repeat every 2-3 minutes in alternating nostrils until medical help arrives. Call 911     nitroGLYCERIN 0.4 MG SL tablet  Commonly known as: NITROSTAT  Place 1 tablet (0.4 mg total) under the tongue every 5 (five) minutes as needed for Chest pain.            Indwelling Lines/Drains at time of discharge:   Lines/Drains/Airways     None                 Time spent on the discharge of patient:  > 35  minutes         Tyrese Singh MD  Department of Hospital Medicine  [facility]  "

## 2022-09-28 NOTE — NURSING
[facility] [place]  Nurses Note -- 4 Eyes      9/27/2022       Skin assessed on: Transfer (ICU)      [x] No Pressure Injuries Present    []Prevention Measures Documented    [] Yes LDA  for Pressure Injury Previously documented     [] Yes New Pressure Injury Discovered   [] LDA for New Pressure Injury Added      Attending RN:  Flower Quinones LPN     Second RN:  Angelo Winkler, PCT

## 2022-09-28 NOTE — PLAN OF CARE
[facility] - Med Surg  Discharge Final Note    Primary Care Provider: Primary Doctor No    Expected Discharge Date: 9/28/2022    Final Discharge Note (most recent)       Final Note - 09/28/22 1522          Final Note    Assessment Type Final Discharge Note     [facility]ticip[facility]position [facility]     What phone number can be called within the next 1-3 days to see how you are doing after discharge? [phone]     Hospital Resources/Appts/Education Provided Appointments scheduled and added to AVS        Post-Acute Status    Post-Acute Authorization Placement     Post-Acute Placement Status Set-up Complete/Auth obtained     Coverage [employer] Dual     Discharge Delays None known at this time                     Important Message from Medicare             Patient accepted to [facility].

## 2022-09-28 NOTE — PROGRESS NOTES
Nutrition-Related Diabetes Education      Time Spent:0    Learners: Patient    Current HbA1c: 10.8      Nutrition Education with handouts:  + Clinical Reference attachments to d/c documents       Comments: Pt with previous non-compliance. Has received DM education before. Unfit for visit from RD.           Barriers to Learning: Non-compliance, mental status    Follow up: No.    Thank you!    Fernando Perez, MPH, RDN, LDN       Patient continue to complain of pains on both legs, swelling on BLUE, left>right, some leaking from left arm, MD paged and updated.

## 2022-09-28 NOTE — PT/OT/SLP PROGRESS
Occupational Therapy      Patient Name:  Cali Mabry   MRN:  [id number]    Patient not seen today secondary to Patient unwilling to participate. Safety sitter at bedside. Pt reports that he is not in pain, but just does not want to get up. Offered all ADLs and encouraged OOB activities, but pt continued to refuse. Will follow-up later as able.    9/28/2022

## 2022-09-28 NOTE — PLAN OF CARE
Pt requesting to continue current diet. ST in agreement with safe swallow strategy of alternating small bites and sips which he is agreeable to.

## 2022-09-28 NOTE — NURSING
Patient discharged per MD order.  IV removed by RN charge nurse Sanjuana.  Catheter tip intact.  No distress noted.    VSS.  Afebrile.  No complaints of pain, N/V, diarrhea or SOB.    Patient left with belongings to Main Entrance via stretcher  per [facility].

## 2022-09-28 NOTE — SUBJECTIVE & OBJECTIVE
"Interval History: unchanged    Family History       Problem Relation (Age of Onset)    Asthma Mother          Tobacco Use    Smoking status: Every Day     Packs/day: 1.00     Years: 20.00     Pack years: 20.00     Types: Cigarettes     Start date: 4/3/1981     Last attempt to quit: 2020     Years since quittin.3    Smokeless tobacco: Never    Tobacco comments:     states approx 1 per day.   Substance and Sexual Activity    Alcohol use: Not Currently     Alcohol/week: 0.0 standard drinks    Drug use: Yes     Types: Heroin, IV, Marijuana, Amphetamines    Sexual activity: Yes     Partners: Female     Psychotherapeutics (From admission, onward)      Start     Stop Route Frequency Ordered    22 1448  OLANZapine injection 10 mg         -- IM 2 times daily PRN 22 1348    22 1400  olanzapine zydis disintegrating tablet 10 mg         -- Oral 2 times daily 22 1348    22 2325  haloperidol lactate injection 2 mg         -- IV Every 6 hours PRN 22 222             Review of Systems   Unable to perform ROS: Psychiatric disorder   Objective:     Vital Signs (Most Recent):  Temp: 97.5 °F (36.4 °C) (22 1154)  Pulse: 105 (22 1154)  Resp: 20 (22 1154)  BP: 118/65 (22 1154)  SpO2: 98 % (22 1154) Vital Signs (24h Range):  Temp:  [97.5 °F (36.4 °C)-98.5 °F (36.9 °C)] 97.5 °F (36.4 °C)  Pulse:  [] 105  Resp:  [16-20] 20  SpO2:  [95 %-99 %] 98 %  BP: (117-121)/(57-65) 118/65     Height: 5' 8" (172.7 cm)  Weight: 53.7 kg (118 lb 6.2 oz)  Body mass index is 18 kg/m².      Intake/Output Summary (Last 24 hours) at 2022 1511  Last data filed at 2022 1752  Gross per 24 hour   Intake 500 ml   Output --   Net 500 ml       Physical Exam  Psychiatric:      Comments: EXAMINATION    CONSTITUTIONAL  General Appearance: hospital attire; restrained to bed    MUSCULOSKELETAL  Muscle Strength and Tone: normal  Abnormal Involuntary Movements: none noted  Gait and " "Station: not observed    PSYCHIATRIC MENTAL STATUS EXAM   Level of Consciousness: awake and alert  Orientation: name, hospital, month/year  Grooming: poor; disheveled  Psychomotor Behavior: calm  Speech: not pressured or loud  Language: no abnormalities  Mood: "leave me the fuck alone"  Affect: irritable  Thought Process: can be linear at times  Associations: loose at times  Thought Content: will not answer; does not appear to be responding to stimuli; perseverative on going home  Memory: will not answer  Attention: not as distracted  Fund of Knowledge: simple to conversation  Insight: poor into current medical or mental or drug use problems  Judgment: limited but improved into redirection or cooperation with care            Significant Labs: All pertinent labs within the past 24 hours have been reviewed.    Significant Imaging: I have reviewed all pertinent imaging results/findings within the past 24 hours.  "

## 2022-09-28 NOTE — PROGRESS NOTES
[facility] Medicine  Progress Note    Patient Name: Cali Mabry  MRN: [id number]  Patient Class: IP- Inpatient   Admission Date: 2022  Length of Stay: 5 days  Attending Physician: Tyrese Dickinson MD  Primary Care Provider: Primary Doctor No        Subjective:     Principal Problem:Diabetic ketoacidosis without coma associated with type 1 diabetes mellitus        HPI:  This is a 57 year old male with a PMHx of T1DM, COPD (not on O2), CAD, HTN, HLD, substance use (including IVDU), MDD who presented with hyperglycemia and AMS.     The patient was altered and would not participate with history taking, no family was at bedside at the time of examination. Per chart review, the patient's family is unsure if he is taking his medications and reported that he gets confused about how to take them. He was reported to have intermittent nausea, vomiting and progressively became more altered. Of note, the patient was recently hospitalized ( - ) with DKA, and has a history of agitation. Last admission, he was evatlued by psychiatry for SI and was PEC'd but ultimately his PEC was rescinded as he was deemed to be seeking attention. The patient denies having any acute complaints but is very lethargic/slow to response. While in the ER, the patient was tachycardic (100s) and tachypnic (20s). Laboratory evaluation was remarkable for HAGMA (PH: 6.9, PCO2: 33.9, A), hyperglycemia (642), hyperkalemia (5.8), leukocytosis (12.9), elevated creatinine (1.9- baseline of 1.2). CXR was negative. UA was unremarkable for an infectious process but demonstrated ketonuria, glucosuria and protienuria. he was started on the DKA protocol and was admitted for further management. Of note, he patient had episodes of agitation which improved with haldol.       Overview/Hospital Course:  Started on ADA protocol w/ resolution of anion gap. Pt then began refusing labs. He was later found to have a glucose in the  20s. He refused dextrose therapy. He became aggressive threatening and swinging at nursing staff. He said that he did not care if he lived or . Said that he did not actually require glucose despite glucose levels in the 20.  Insulin reduced, glucose improved, continue to titrate.  Will seek inpatient psychiatric facility.  Depakote 500 mg b.i.d. initiated. Will step-down to floor.       Interval History: says he is not going to inpatient psych    Review of Systems   Constitutional:  Negative for activity change and appetite change.   HENT:  Negative for congestion and dental problem.    Eyes:  Negative for discharge and itching.   Respiratory:  Negative for apnea and chest tightness.    Cardiovascular:  Negative for chest pain.   Gastrointestinal:  Negative for abdominal distention.   Endocrine: Negative for cold intolerance.   Genitourinary:  Negative for difficulty urinating and dysuria.   Musculoskeletal:  Negative for arthralgias and back pain.   Allergic/Immunologic: Negative for environmental allergies.   Neurological:  Negative for dizziness and facial asymmetry.   Psychiatric/Behavioral:  Negative for agitation.    Objective:     Vital Signs (Most Recent):  Temp: 98.5 °F (36.9 °C) (22 0809)  Pulse: 90 (22 0809)  Resp: 20 (22 0809)  BP: 121/63 (22 0809)  SpO2: 98 % (22 08) Vital Signs (24h Range):  Temp:  [98.4 °F (36.9 °C)-98.8 °F (37.1 °C)] 98.5 °F (36.9 °C)  Pulse:  [90-97] 90  Resp:  [16-20] 20  SpO2:  [95 %-99 %] 98 %  BP: (117-125)/(57-63) 121/63     Weight: 53.7 kg (118 lb 6.2 oz)  Body mass index is 18 kg/m².    Intake/Output Summary (Last 24 hours) at 2022 1004  Last data filed at 2022 1752  Gross per 24 hour   Intake 1240 ml   Output 900 ml   Net 340 ml      Physical Exam  Constitutional:       General: He is not in acute distress.     Appearance: Normal appearance. He is not ill-appearing, toxic-appearing or diaphoretic.   Eyes:      Conjunctiva/sclera:  Conjunctivae normal.   Cardiovascular:      Rate and Rhythm: Normal rate and regular rhythm.   Pulmonary:      Effort: Pulmonary effort is normal. No respiratory distress.   Skin:     General: Skin is warm and dry.   Neurological:      Mental Status: He is alert and oriented to person, place, and time.   Psychiatric:         Behavior: Behavior normal.       Significant Labs: All pertinent labs within the past 24 hours have been reviewed.  BMP:   Recent Labs   Lab 09/27/22  0427 09/27/22  1457   * 595*    139   K 4.0 3.9    106   CO2 23 23   BUN 16 21*   CREATININE 1.0 1.4   CALCIUM 8.5* 8.7   MG 2.0  --      CBC:   Recent Labs   Lab 09/27/22 0427   WBC 5.25   HGB 11.0*   HCT 34.0*          Significant Imaging:       Assessment/Plan:      * Diabetic ketoacidosis without coma associated with type 1 diabetes mellitus  - Patient with a history of diabetes  - Last A1c:   Lab Results   Component Value Date    HGBA1C 10.8 (H) 09/17/2022     - Labs suggestive of DKA   - Likely secondary to medication non compliance. Less likely to be triggered by infection as his work up is negative. Leukocytosis is likely reactive.     Plan:  - DKA protocol   - Continue IVF       Transitioned to subq insulin. Complicated by hypoglycemia. Dose reduced. Medical care is severely challenging due to refusals/nonadherence.    COPD (chronic obstructive pulmonary disease)  - Reported history of COPD  - No PFTs on file  - Not on O2  - Not in exacerbation      Plan:   - Duo-Nebs PRN   - Outpatient follow up with pulmonary/PCP    Substance induced mood disorder  - History of non-compliance, agitation and SI   - Agitated in the ER during this admission, improved with IV haldol   - Amphetamine positive on ED tox screen    Pt PEC'd for the second time in two admissions for aggression and concern for self harm. Minimal understanding of medical situation. Family reports that he is increasing confused, gets lost when driving  requiring police to find him and bring him home, and is increasingly forgetful, all concerning for early onset dementia in addition to his substance abuse disorder and possible personality disorder. Pt shows little understanding of his own health conditions, and his repeated admissions for severe DKA call in to question the ability for him to take care of himself.   - psychiatry consulted    RAMYA (acute kidney injury)  Resolved w/ IVF    CAD (coronary artery disease)  - Cath (9/28/2020):  60% in stent restenosis of previously placed right coronary artery stent s/p balloon angioplasty. 50% stenosis noted in the mid and distal left anterior descending artery & 70 % stenosis at the apex, small caliber.  - Home medications: ASA, Lipitor     Plan:   - Resume home medications     Moderate protein malnutrition  Nutrition consulted.   Most recent weight and BMI monitored    Measurements:  Wt Readings from Last 1 Encounters:   09/26/22 53.7 kg (118 lb 6.2 oz)   Body mass index is 18 kg/m².    Chronic hepatitis C  - H/O IVDU with heroin   - Outpatient follow up with hepatology       Hyperlipidemia  - Resume Lipitor 20 mg     Essential hypertension  - History of HTN but not on active medications at this time   - Continue to monitor       VTE Risk Mitigation (From admission, onward)         Ordered     heparin (porcine) injection 5,000 Units  Every 12 hours         09/24/22 0312     Place sequential compression device  Until discontinued         09/23/22 2132     IP VTE LOW RISK PATIENT  Once         09/23/22 2132                Discharge Planning   ROLA:      Code Status: Full Code   Is the patient medically ready for discharge?:     Reas  Discharge Plan A: Home with family (Case management to coordinate care; family wants all [facility] providers: Neurology; Nephrology; Endocrinology; and [facility]; out-patient case management)     Patient medically stable for inpatient psych transfer.                    Tyrese Singh,  MD  Department of Hospital Medicine   [facility] - Med Surg

## 2022-09-28 NOTE — NURSING
Rec report from Ginette. VSS. Resting comfortably. NAD noted. Sitter at bedside.     Gave report to April. Waiting on transport.     [facility] transport here to take pt with security and sitter. Belongings with patient

## 2022-09-28 NOTE — ASSESSMENT & PLAN NOTE
Long chronic history of drug use with comorbid mood abnormalities, mostly irritability and agitation.  Agree with PEC/CEC and 1:1 sitter.  Seek acute inpatient psychiatric admission at this time.  Given his aggression, utilize restraints as needed for patient and staff safety.  Limit visitation from mother as this usually gets him more agitated.  Also limit his phone use so as not to cause more agitation from family.  Start olanzapine 10 mg BID, provide IM if refuses PO.  Also start Depakote 500 mg BID for mood stabilization.  Ultimately needs to obtain sobriety from drugs for best results of care.    9/27/22 - Continue olanzapine and Depakote.  Seek acute inpatient psychiatric care.  His mood is still dysregulated.  No more contact with mother due to irritability.    9/28/22 - Continue olanzapine and Depakote.  Transfer to inpatient psychiatric facility with potential plan for rehab.  Limit contact with mother.

## 2022-09-28 NOTE — SUBJECTIVE & OBJECTIVE
Interval History: says he is not going to inpatient psych    Review of Systems   Constitutional:  Negative for activity change and appetite change.   HENT:  Negative for congestion and dental problem.    Eyes:  Negative for discharge and itching.   Respiratory:  Negative for apnea and chest tightness.    Cardiovascular:  Negative for chest pain.   Gastrointestinal:  Negative for abdominal distention.   Endocrine: Negative for cold intolerance.   Genitourinary:  Negative for difficulty urinating and dysuria.   Musculoskeletal:  Negative for arthralgias and back pain.   Allergic/Immunologic: Negative for environmental allergies.   Neurological:  Negative for dizziness and facial asymmetry.   Psychiatric/Behavioral:  Negative for agitation.    Objective:     Vital Signs (Most Recent):  Temp: 98.5 °F (36.9 °C) (09/28/22 0809)  Pulse: 90 (09/28/22 0809)  Resp: 20 (09/28/22 0809)  BP: 121/63 (09/28/22 0809)  SpO2: 98 % (09/28/22 0809) Vital Signs (24h Range):  Temp:  [98.4 °F (36.9 °C)-98.8 °F (37.1 °C)] 98.5 °F (36.9 °C)  Pulse:  [90-97] 90  Resp:  [16-20] 20  SpO2:  [95 %-99 %] 98 %  BP: (117-125)/(57-63) 121/63     Weight: 53.7 kg (118 lb 6.2 oz)  Body mass index is 18 kg/m².    Intake/Output Summary (Last 24 hours) at 9/28/2022 1004  Last data filed at 9/27/2022 1752  Gross per 24 hour   Intake 1240 ml   Output 900 ml   Net 340 ml      Physical Exam  Constitutional:       General: He is not in acute distress.     Appearance: Normal appearance. He is not ill-appearing, toxic-appearing or diaphoretic.   Eyes:      Conjunctiva/sclera: Conjunctivae normal.   Cardiovascular:      Rate and Rhythm: Normal rate and regular rhythm.   Pulmonary:      Effort: Pulmonary effort is normal. No respiratory distress.   Skin:     General: Skin is warm and dry.   Neurological:      Mental Status: He is alert and oriented to person, place, and time.   Psychiatric:         Behavior: Behavior normal.       Significant Labs: All pertinent  labs within the past 24 hours have been reviewed.  BMP:   Recent Labs   Lab 09/27/22 0427 09/27/22  1457   * 595*    139   K 4.0 3.9    106   CO2 23 23   BUN 16 21*   CREATININE 1.0 1.4   CALCIUM 8.5* 8.7   MG 2.0  --      CBC:   Recent Labs   Lab 09/27/22 0427   WBC 5.25   HGB 11.0*   HCT 34.0*          Significant Imaging:

## 2022-09-28 NOTE — PROGRESS NOTES
"[facility] [place] [facility]  Psychiatry  Progress Note    Patient Name: Cali Mabry  MRN: [id number]   Code Status: Full Code  Admission Date: 9/23/2022  Hospital Length of Stay: 5 days  Expected Discharge Date: 9/28/2022  Attending Physician: Tyrese Dickinson MD  Primary Care Provider: Primary Doctor No    Current Legal Status: 's Emergency Certificate (CEC)    Patient information was obtained from patient, ER records and chart review, nursing.       Subjective:     Patient is a 57 y.o., male, presents with:    Principal Problem:Diabetic ketoacidosis without coma associated with type 1 diabetes mellitus    Chief Complaint: agitation, irritability    HPI: Patient Cali Mabry presents to the hospital with DKA and glucose dysregulation.  He was PEC'd for his agitation and subsequently CEC'd by the 's office.  He is known to me from a hospitalization last week in which he was aggressive and threatening, even kicking me.  He regulated with olanzapine PRNs and quetiapine.  Upon discharge, he was gone for a couple days and presented back with amphetamine positive toxicology screen.  Known history of IV meth and heroin use.  Family reported to the ED some memory loss while driving and having to be brought back home by police after getting lost.  Today, he is loud and agitated.  Curing profusely and threatening.  He throws his deodorant bottle across the room.  He refuses to engage in a civil conversation other than to scream and curse.  Although, he does have a good rapport with his nurse today.  He adamantly denies any symptoms or care for his diabetes.  Aggressively stating that he is going to leave.        Hospital Course: 09/27/2022 - Patient is seen in his room.  He openly tells me to "get the fuck out" and doesn't speak with me.  He just had a loud, irritable, disruptive conversation with mother over the phone.  When I mention that we are managing his symptoms to help him from a psychiatric " "standpoint, he says "I ain't fucking going to no psych hospital".  Nursing reports that he has been cooperative with his medications and it has calmed him down somewhat.  Blood sugars still unregulated due to diet.    2022 - Patient seen in his room.  Still loud and irritable.  States that he is "not fucking going to a psychiatric hospital or rehab".  "Those places are for losers."  Does not wish to engage with me.  Has been accepted to psychiatric facility.  Nursing reports that he is reluctantly taking his olanzapine and Depakote but refusing vitals and insulin at times.  Yesterday, mother was escorted off by security because of negative interaction with patient.      Interval History: unchanged    Family History       Problem Relation (Age of Onset)    Asthma Mother          Tobacco Use    Smoking status: Every Day     Packs/day: 1.00     Years: 20.00     Pack years: 20.00     Types: Cigarettes     Start date: 4/3/1981     Last attempt to quit: 2020     Years since quittin.3    Smokeless tobacco: Never    Tobacco comments:     states approx 1 per day.   Substance and Sexual Activity    Alcohol use: Not Currently     Alcohol/week: 0.0 standard drinks    Drug use: Yes     Types: Heroin, IV, Marijuana, Amphetamines    Sexual activity: Yes     Partners: Female     Psychotherapeutics (From admission, onward)      Start     Stop Route Frequency Ordered    22 1448  OLANZapine injection 10 mg         -- IM 2 times daily PRN 22 1348    22 1400  olanzapine zydis disintegrating tablet 10 mg         -- Oral 2 times daily 22 1348    22 2325  haloperidol lactate injection 2 mg         -- IV Every 6 hours PRN 22 2225             Review of Systems   Unable to perform ROS: Psychiatric disorder   Objective:     Vital Signs (Most Recent):  Temp: 97.5 °F (36.4 °C) (22 1154)  Pulse: 105 (22 1154)  Resp: 20 (22 1154)  BP: 118/65 (22 1154)  SpO2: 98 % " "(09/28/22 1154) Vital Signs (24h Range):  Temp:  [97.5 °F (36.4 °C)-98.5 °F (36.9 °C)] 97.5 °F (36.4 °C)  Pulse:  [] 105  Resp:  [16-20] 20  SpO2:  [95 %-99 %] 98 %  BP: (117-121)/(57-65) 118/65     Height: 5' 8" (172.7 cm)  Weight: 53.7 kg (118 lb 6.2 oz)  Body mass index is 18 kg/m².      Intake/Output Summary (Last 24 hours) at 9/28/2022 1511  Last data filed at 9/27/2022 1752  Gross per 24 hour   Intake 500 ml   Output --   Net 500 ml       Physical Exam  Psychiatric:      Comments: EXAMINATION    CONSTITUTIONAL  General Appearance: hospital attire; restrained to bed    MUSCULOSKELETAL  Muscle Strength and Tone: normal  Abnormal Involuntary Movements: none noted  Gait and Station: not observed    PSYCHIATRIC MENTAL STATUS EXAM   Level of Consciousness: awake and alert  Orientation: name, hospital, month/year  Grooming: poor; disheveled  Psychomotor Behavior: calm  Speech: not pressured or loud  Language: no abnormalities  Mood: "leave me the fuck alone"  Affect: irritable  Thought Process: can be linear at times  Associations: loose at times  Thought Content: will not answer; does not appear to be responding to stimuli; perseverative on going home  Memory: will not answer  Attention: not as distracted  Fund of Knowledge: simple to conversation  Insight: poor into current medical or mental or drug use problems  Judgment: limited but improved into redirection or cooperation with care            Significant Labs: All pertinent labs within the past 24 hours have been reviewed.    Significant Imaging: I have reviewed all pertinent imaging results/findings within the past 24 hours.       Scheduled Medications:   aspirin  81 mg Oral Daily    atorvastatin  20 mg Oral Daily    divalproex  500 mg Oral Q12H    famotidine  20 mg Oral BID    gabapentin  100 mg Oral BID    heparin (porcine)  5,000 Units Subcutaneous Q12H    insulin aspart U-100  10 Units Subcutaneous TIDWM    insulin detemir U-100  28 Units " Subcutaneous QHS    mupirocin   Nasal BID    olanzapine zydis  10 mg Oral BID    polyethylene glycol  17 g Oral Daily       PRN Medications:  acetaminophen, albuterol-ipratropium, dextrose 10%, dextrose 10%, dextrose 50%, dextrose 50%, glucagon (human recombinant), glucose, glucose, guaiFENesin 100 mg/5 ml, haloperidol lactate, insulin aspart U-100, ketorolac, melatonin, OLANZapine, ondansetron, potassium chloride **AND** potassium chloride **AND** potassium chloride, sodium chloride 0.9%, sodium chloride 0.9%    Review of patient's allergies indicates:  No Known Allergies    Assessment/Plan:     Amphetamine use disorder, severe, dependence  Long chronic history of drug use with little insight into this being a problem.  Not open to rehab or counseling at this time.    Substance induced mood disorder  Long chronic history of drug use with comorbid mood abnormalities, mostly irritability and agitation.  Agree with PEC/CEC and 1:1 sitter.  Seek acute inpatient psychiatric admission at this time.  Given his aggression, utilize restraints as needed for patient and staff safety.  Limit visitation from mother as this usually gets him more agitated.  Also limit his phone use so as not to cause more agitation from family.  Start olanzapine 10 mg BID, provide IM if refuses PO.  Also start Depakote 500 mg BID for mood stabilization.  Ultimately needs to obtain sobriety from drugs for best results of care.    9/27/22 - Continue olanzapine and Depakote.  Seek acute inpatient psychiatric care.  His mood is still dysregulated.  No more contact with mother due to irritability.    9/28/22 - Continue olanzapine and Depakote.  Transfer to inpatient psychiatric facility with potential plan for rehab.  Limit contact with mother.         Need for Continued Hospitalization:  Psychiatric illness continues to pose a potential threat to life or bodily function, of self or others, thereby requiring the need for continued inpatient  psychiatric hospitalization., Protective inpatient psychiatric hospitalization required while a safe disposition plan is enacted. and Requires ongoing hospitalization for stabilization of medications.    Anticipated Disposition:  Psychiatric Hospital    Total time:  35 with greater than 50% of this time spent in counseling and/or coordination of care.       Mahamed Menendez MD   Psychiatry  [facility]

## 2022-09-29 LAB — POCT GLUCOSE: 419 MG/DL (ref 70–110)

## 2022-10-11 ENCOUNTER — OFFICE VISIT (OUTPATIENT)
Dept: FAMILY MEDICINE | Facility: CLINIC | Age: 57
End: 2022-10-11
Payer: MEDICARE

## 2022-10-11 VITALS
TEMPERATURE: 98 F | BODY MASS INDEX: 19.18 KG/M2 | OXYGEN SATURATION: 99 % | RESPIRATION RATE: 16 BRPM | HEIGHT: 68 IN | HEART RATE: 63 BPM | SYSTOLIC BLOOD PRESSURE: 118 MMHG | DIASTOLIC BLOOD PRESSURE: 69 MMHG | WEIGHT: 126.56 LBS

## 2022-10-11 DIAGNOSIS — E10.69 TYPE 1 DIABETES MELLITUS WITH OTHER SPECIFIED COMPLICATION: Primary | ICD-10-CM

## 2022-10-11 DIAGNOSIS — I10 ESSENTIAL HYPERTENSION: Chronic | ICD-10-CM

## 2022-10-11 DIAGNOSIS — J44.1 COPD EXACERBATION: ICD-10-CM

## 2022-10-11 DIAGNOSIS — Z86.59 HISTORY OF PSYCHIATRIC HOSPITALIZATION: ICD-10-CM

## 2022-10-11 PROCEDURE — 3078F PR MOST RECENT DIASTOLIC BLOOD PRESSURE < 80 MM HG: ICD-10-PCS | Mod: CPTII,S$GLB,, | Performed by: NURSE PRACTITIONER

## 2022-10-11 PROCEDURE — 99999 PR PBB SHADOW E&M-EST. PATIENT-LVL V: CPT | Mod: PBBFAC,,, | Performed by: NURSE PRACTITIONER

## 2022-10-11 PROCEDURE — 1111F PR DISCHARGE MEDS RECONCILED W/ CURRENT OUTPATIENT MED LIST: ICD-10-PCS | Mod: CPTII,S$GLB,, | Performed by: NURSE PRACTITIONER

## 2022-10-11 PROCEDURE — 3074F SYST BP LT 130 MM HG: CPT | Mod: CPTII,S$GLB,, | Performed by: NURSE PRACTITIONER

## 2022-10-11 PROCEDURE — 1111F DSCHRG MED/CURRENT MED MERGE: CPT | Mod: CPTII,S$GLB,, | Performed by: NURSE PRACTITIONER

## 2022-10-11 PROCEDURE — 3074F PR MOST RECENT SYSTOLIC BLOOD PRESSURE < 130 MM HG: ICD-10-PCS | Mod: CPTII,S$GLB,, | Performed by: NURSE PRACTITIONER

## 2022-10-11 PROCEDURE — 3008F BODY MASS INDEX DOCD: CPT | Mod: CPTII,S$GLB,, | Performed by: NURSE PRACTITIONER

## 2022-10-11 PROCEDURE — 3008F PR BODY MASS INDEX (BMI) DOCUMENTED: ICD-10-PCS | Mod: CPTII,S$GLB,, | Performed by: NURSE PRACTITIONER

## 2022-10-11 PROCEDURE — 1159F MED LIST DOCD IN RCRD: CPT | Mod: CPTII,S$GLB,, | Performed by: NURSE PRACTITIONER

## 2022-10-11 PROCEDURE — 3046F PR MOST RECENT HEMOGLOBIN A1C LEVEL > 9.0%: ICD-10-PCS | Mod: CPTII,S$GLB,, | Performed by: NURSE PRACTITIONER

## 2022-10-11 PROCEDURE — 3078F DIAST BP <80 MM HG: CPT | Mod: CPTII,S$GLB,, | Performed by: NURSE PRACTITIONER

## 2022-10-11 PROCEDURE — 3046F HEMOGLOBIN A1C LEVEL >9.0%: CPT | Mod: CPTII,S$GLB,, | Performed by: NURSE PRACTITIONER

## 2022-10-11 PROCEDURE — 99999 PR PBB SHADOW E&M-EST. PATIENT-LVL V: ICD-10-PCS | Mod: PBBFAC,,, | Performed by: NURSE PRACTITIONER

## 2022-10-11 PROCEDURE — 1160F RVW MEDS BY RX/DR IN RCRD: CPT | Mod: CPTII,S$GLB,, | Performed by: NURSE PRACTITIONER

## 2022-10-11 PROCEDURE — 99214 PR OFFICE/OUTPT VISIT, EST, LEVL IV, 30-39 MIN: ICD-10-PCS | Mod: S$GLB,,, | Performed by: NURSE PRACTITIONER

## 2022-10-11 PROCEDURE — 1159F PR MEDICATION LIST DOCUMENTED IN MEDICAL RECORD: ICD-10-PCS | Mod: CPTII,S$GLB,, | Performed by: NURSE PRACTITIONER

## 2022-10-11 PROCEDURE — 99214 OFFICE O/P EST MOD 30 MIN: CPT | Mod: S$GLB,,, | Performed by: NURSE PRACTITIONER

## 2022-10-11 PROCEDURE — 1160F PR REVIEW ALL MEDS BY PRESCRIBER/CLIN PHARMACIST DOCUMENTED: ICD-10-PCS | Mod: CPTII,S$GLB,, | Performed by: NURSE PRACTITIONER

## 2022-10-11 RX ORDER — AZITHROMYCIN 250 MG/1
TABLET, FILM COATED ORAL
Qty: 6 TABLET | Refills: 0 | Status: SHIPPED | OUTPATIENT
Start: 2022-10-11 | End: 2022-10-16

## 2022-10-11 NOTE — PROGRESS NOTES
Routine Office Visit    Patient Name: Cali Mabry    : 1965  MRN: [id number]    Chief Complaint:  Hospital follow-up    Subjective:  Cali is a 57 y.o. male who presents today for a hospital follow-up.  Discharge summary is as follows in bold:  [facility] Medicine  Discharge Summary        Patient Name: Cali Mabry  MRN: [id number]  Patient Class: IP- Inpatient  Admission Date: 2022  Hospital Length of Stay: 5 days  Discharge Date and Time:  2022 3:09 PM  Attending Physician: Tyrese Dickinson MD   Discharging Provider: Tyrese Dickinson MD  Primary Care Provider: Primary Doctor No        HPI:   This is a 57 year old male with a PMHx of T1DM, COPD (not on O2), CAD, HTN, HLD, substance use (including IVDU), MDD who presented with hyperglycemia and AMS.      The patient was altered and would not participate with history taking, no family was at bedside at the time of examination. Per chart review, the patient's family is unsure if he is taking his medications and reported that he gets confused about how to take them. He was reported to have intermittent nausea, vomiting and progressively became more altered. Of note, the patient was recently hospitalized ( - ) with DKA, and has a history of agitation. Last admission, he was evatlued by psychiatry for SI and was PEC'd but ultimately his PEC was rescinded as he was deemed to be seeking attention. The patient denies having any acute complaints but is very lethargic/slow to response. While in the ER, the patient was tachycardic (100s) and tachypnic (20s). Laboratory evaluation was remarkable for HAGMA (PH: 6.9, PCO2: 33.9, A), hyperglycemia (642), hyperkalemia (5.8), leukocytosis (12.9), elevated creatinine (1.9- baseline of 1.2). CXR was negative. UA was unremarkable for an infectious process but demonstrated ketonuria, glucosuria and protienuria. he was started on the DKA protocol and was admitted for  further management. Of note, he patient had episodes of agitation which improved with haldol.         * No surgery found *       Hospital Course:   Started on ADA protocol w/ resolution of anion gap. Pt then began refusing labs. He was later found to have a glucose in the 20s. He refused dextrose therapy. He became aggressive threatening and swinging at nursing staff. He said that he did not care if he lived or . Said that he did not actually require glucose despite glucose levels in the 20.  Insulin reduced, glucose improved, continue to titrate.  Will seek inpatient psychiatric facility.  Depakote 500 mg b.i.d. initiated. Will step-down to floor.  Patient discharged to inpatient psych on 22. Activity as tolerated. Follow up prn.  Diet- ADA 2000 lexie diet     Patient who is new to me reports today alone for hospital follow-up.  Review of his chart shows that he was discharged to a psych facility but he does not remember where and does not remember what was done at this facility.  He is taking his medications charted but states that he does not take any medications for his mood.  He denies any SI/HI and states that he was only admitted to a facility because he cursed at the attending physician at [facility].    He is compliant with his insulin but does changes doses based on what he eats.  It does not appear that he checks his blood sugar.  He states that he has been feeling well since leaving the hospital.  He denies any chest pain or shortness of breath but does report a productive cough for the last 3 weeks.  He has a history of COPD.  States that he has quit smoking in the last month.  No wheezing, fevers, or chills.    I have been told by the  that due to his insurance, he cannot be seen at [facility] primary care or specialty.  He states he is unsure if he has a primary doctor, however review of his chart shows that he has been seen at [facility] before.    Past Medical History  Past Medical History:    Diagnosis Date    Acute on chronic pancreatitis 2016    Addiction to drug     CHF (congestive heart failure)     DKA (diabetic ketoacidoses) 2020    DM (diabetes mellitus), type 1     HCV (hepatitis C virus)     high VL     HTN (hypertension)     Hx of psychiatric care     Hypomagnesemia 2020    IVDU (intravenous drug user)     Heroin    Pancreatitis     Psychiatric problem     Substance abuse        Past Surgical History  Past Surgical History:   Procedure Laterality Date    CARDIAC SURGERY      stent placed. ([facility])    ESOPHAGOGASTRODUODENOSCOPY N/A 3/5/2020    Gastritis.  No H pylori.  Completed PPI for 1 month.  Procedure: EGD (ESOPHAGOGASTRODUODENOSCOPY);  Surgeon: Brinda Rene MD;  Location: [facility] ENDO;  Service: Endoscopy;  Laterality: N/A;  [id number] A; [phone]    LEFT HEART CATHETERIZATION Left 2020    Procedure: Left heart cath;  Surgeon: Fito Rangel MD;  Location: [facility] CATH LAB;  Service: Cardiology;  Laterality: Left;    SHOULDER SURGERY      right shoulder, spur removal.       Family History  Family History   Problem Relation Age of Onset    Asthma Mother        Social History  Social History     Socioeconomic History    Marital status: Single   Tobacco Use    Smoking status: Every Day     Packs/day: 1.00     Years: 20.00     Pack years: 20.00     Types: Cigarettes     Start date: 4/3/1981     Last attempt to quit: 2020     Years since quittin.3    Smokeless tobacco: Never    Tobacco comments:     states approx 1 per day.   Substance and Sexual Activity    Alcohol use: Not Currently     Alcohol/week: 0.0 standard drinks    Drug use: Yes     Types: Heroin, IV, Marijuana, Amphetamines    Sexual activity: Yes     Partners: Female       Current Medications  Current Outpatient Medications on File Prior to Visit   Medication Sig Dispense Refill    aspirin 81 MG Chew Take 1 tablet (81 mg total) by mouth once daily. 90 tablet 3    atorvastatin (LIPITOR) 20 MG  "tablet Take 1 tablet (20 mg total) by mouth once daily. 90 tablet 3    blood sugar diagnostic Strp Use To check blood glucose three times daily, 200 each 0    gabapentin (NEURONTIN) 100 MG capsule Take 100 mg by mouth 3 (three) times daily.      insulin detemir U-100 (LEVEMIR FLEXTOUCH) 100 unit/mL (3 mL) SubQ InPn pen Inject 40 Units into the skin every evening. 15 mL 2    insulin lispro 100 unit/mL pen Inject 7 Units into the skin 3 (three) times daily with meals. 15 mL 2    insulin syringe-needle U-100 0.5 mL 31 gauge x 5/16" Syrg 1 each by Misc.(Non-Drug; Combo Route) route 3 (three) times daily. 90 each 0    lancets Misc To check BG 3 times daily, to use with insurance preferred meter 200 each 0    naloxone (NARCAN) 4 mg/actuation Spry 4mg by nasal route as needed for opioid overdose; may repeat every 2-3 minutes in alternating nostrils until medical help arrives. Call 911 1 each 11    pen needle, diabetic (CLICKFINE PEN NEEDLE) 32 gauge x 5/32" Ndle 1 each by Misc.(Non-Drug; Combo Route) route 4 (four) times daily before meals and nightly. 100 each 2    nitroGLYCERIN (NITROSTAT) 0.4 MG SL tablet Place 1 tablet (0.4 mg total) under the tongue every 5 (five) minutes as needed for Chest pain. (Patient not taking: Reported on 9/16/2022) 100 tablet 0    [DISCONTINUED] insulin aspart U-100 (NOVOLOG) 100 unit/mL injection Inject 1-10 Units into the skin 3 (three) times daily with meals.       No current facility-administered medications on file prior to visit.       Allergies   Review of patient's allergies indicates:  No Known Allergies    Review of Systems (Pertinent positives)  Review of Systems   Constitutional: Negative.  Negative for chills, diaphoresis, fever, malaise/fatigue and weight loss.   HENT: Negative.     Eyes: Negative.  Negative for blurred vision, double vision and photophobia.   Respiratory:  Positive for cough and sputum production. Negative for hemoptysis, shortness of breath and wheezing.  " "  Cardiovascular:  Negative for chest pain and orthopnea.   Gastrointestinal: Negative.    Genitourinary: Negative.    Musculoskeletal: Negative.    Skin: Negative.    Neurological: Negative.    Endo/Heme/Allergies: Negative.    Psychiatric/Behavioral: Negative.       /69 (BP Location: Left arm, Patient Position: Sitting, BP Method: Medium (Manual))   Pulse 63   Temp 97.6 °F (36.4 °C) (Oral)   Resp 16   Ht 5' 8" (1.727 m)   Wt 57.4 kg (126 lb 8.7 oz)   SpO2 99%   BMI 19.24 kg/m²     Physical Exam  Vitals reviewed.   Constitutional:       General: He is not in acute distress.     Appearance: Normal appearance. He is not ill-appearing, toxic-appearing or diaphoretic.   HENT:      Head: Normocephalic and atraumatic.   Cardiovascular:      Rate and Rhythm: Normal rate and regular rhythm.      Pulses: Normal pulses.      Heart sounds: Normal heart sounds.   Pulmonary:      Effort: Pulmonary effort is normal. No respiratory distress.      Breath sounds: Normal breath sounds. No wheezing.   Abdominal:      General: Bowel sounds are normal. There is no distension.      Palpations: Abdomen is soft.      Tenderness: There is no abdominal tenderness.   Musculoskeletal:         General: No swelling, tenderness or deformity. Normal range of motion.   Skin:     General: Skin is warm and dry.      Capillary Refill: Capillary refill takes less than 2 seconds.   Neurological:      General: No focal deficit present.      Mental Status: He is alert and oriented to person, place, and time.      Motor: No weakness.      Coordination: Coordination normal.      Gait: Gait normal.      Deep Tendon Reflexes: Reflexes normal.   Psychiatric:         Mood and Affect: Mood normal.         Behavior: Behavior normal.         Thought Content: Thought content normal.         Judgment: Judgment normal.        Assessment/Plan:  Cali Mabry is a 57 y.o. male who presents today for :    Diagnoses and all orders for this " visit:    Type 1 diabetes mellitus with other specified complication  -     Ambulatory referral/consult to Endocrinology; Future    Essential hypertension    History of psychiatric hospitalization    COPD exacerbation  -     azithromycin (Z-IRASEMA) 250 MG tablet; Take 2 tablets by mouth on day 1; Take 1 tablet by mouth on days 2-5  -     Ambulatory referral/consult to Pulmonology; Future    I had a lengthy discussion with the patient regarding his illnesses.    History of psych hospitalization.  He does not remember the details of this recent hospitalization.  He appears lucid and denies any SI/HI or disturbances in his mood.  I recommended referral to an external psychiatrist, however he declines at this time.    Treat with Z-Irasema for COPD exacerbation.  Will avoid p.o. steroids due to issues with type 1 diabetes.  No wheezing on examination.  No need for chest imaging.    I have provided external referrals for endocrinology and pulmonology for the patient.  I did print out these forms for him, signed them, and provided them to the patient.  I have recommended him to contact his insurance company to see where he can be seen for primary care and recommended him to schedule an appointment there as soon as possible to get his diabetes under control.    Otherwise he should continue his current medications.    All questions answered.        This office note has been dictated.  This dictation has been generated using M-Modal Fluency Direct dictation; some phonetic errors may occur.   My collaborating physician is Dr. Jovi Frances.

## 2022-10-18 ENCOUNTER — OFFICE VISIT (OUTPATIENT)
Dept: URGENT CARE | Facility: CLINIC | Age: 57
End: 2022-10-18
Payer: MEDICARE

## 2022-10-18 VITALS
RESPIRATION RATE: 18 BRPM | DIASTOLIC BLOOD PRESSURE: 89 MMHG | BODY MASS INDEX: 19.01 KG/M2 | OXYGEN SATURATION: 97 % | TEMPERATURE: 98 F | HEART RATE: 100 BPM | SYSTOLIC BLOOD PRESSURE: 143 MMHG | WEIGHT: 125 LBS

## 2022-10-18 DIAGNOSIS — J06.9 VIRAL URI WITH COUGH: Primary | ICD-10-CM

## 2022-10-18 LAB
CTP QC/QA: YES
POC MOLECULAR INFLUENZA A AGN: NEGATIVE
POC MOLECULAR INFLUENZA B AGN: NEGATIVE

## 2022-10-18 PROCEDURE — 1159F PR MEDICATION LIST DOCUMENTED IN MEDICAL RECORD: ICD-10-PCS | Mod: CPTII,S$GLB,, | Performed by: NURSE PRACTITIONER

## 2022-10-18 PROCEDURE — 3046F PR MOST RECENT HEMOGLOBIN A1C LEVEL > 9.0%: ICD-10-PCS | Mod: CPTII,S$GLB,, | Performed by: NURSE PRACTITIONER

## 2022-10-18 PROCEDURE — 99203 OFFICE O/P NEW LOW 30 MIN: CPT | Mod: S$GLB,,, | Performed by: NURSE PRACTITIONER

## 2022-10-18 PROCEDURE — 1160F PR REVIEW ALL MEDS BY PRESCRIBER/CLIN PHARMACIST DOCUMENTED: ICD-10-PCS | Mod: CPTII,S$GLB,, | Performed by: NURSE PRACTITIONER

## 2022-10-18 PROCEDURE — 3046F HEMOGLOBIN A1C LEVEL >9.0%: CPT | Mod: CPTII,S$GLB,, | Performed by: NURSE PRACTITIONER

## 2022-10-18 PROCEDURE — 1159F MED LIST DOCD IN RCRD: CPT | Mod: CPTII,S$GLB,, | Performed by: NURSE PRACTITIONER

## 2022-10-18 PROCEDURE — 1160F RVW MEDS BY RX/DR IN RCRD: CPT | Mod: CPTII,S$GLB,, | Performed by: NURSE PRACTITIONER

## 2022-10-18 PROCEDURE — 3079F PR MOST RECENT DIASTOLIC BLOOD PRESSURE 80-89 MM HG: ICD-10-PCS | Mod: CPTII,S$GLB,, | Performed by: NURSE PRACTITIONER

## 2022-10-18 PROCEDURE — 87502 POCT INFLUENZA A/B MOLECULAR: ICD-10-PCS | Mod: QW,S$GLB,, | Performed by: NURSE PRACTITIONER

## 2022-10-18 PROCEDURE — 3077F SYST BP >= 140 MM HG: CPT | Mod: CPTII,S$GLB,, | Performed by: NURSE PRACTITIONER

## 2022-10-18 PROCEDURE — 99203 PR OFFICE/OUTPT VISIT, NEW, LEVL III, 30-44 MIN: ICD-10-PCS | Mod: S$GLB,,, | Performed by: NURSE PRACTITIONER

## 2022-10-18 PROCEDURE — 3079F DIAST BP 80-89 MM HG: CPT | Mod: CPTII,S$GLB,, | Performed by: NURSE PRACTITIONER

## 2022-10-18 PROCEDURE — 87502 INFLUENZA DNA AMP PROBE: CPT | Mod: QW,S$GLB,, | Performed by: NURSE PRACTITIONER

## 2022-10-18 PROCEDURE — 3077F PR MOST RECENT SYSTOLIC BLOOD PRESSURE >= 140 MM HG: ICD-10-PCS | Mod: CPTII,S$GLB,, | Performed by: NURSE PRACTITIONER

## 2022-10-18 RX ORDER — GUAIFENESIN 600 MG/1
1200 TABLET, EXTENDED RELEASE ORAL 2 TIMES DAILY
Qty: 40 TABLET | Refills: 0 | Status: SHIPPED | OUTPATIENT
Start: 2022-10-18 | End: 2022-10-28

## 2022-10-18 RX ORDER — CETIRIZINE HYDROCHLORIDE 10 MG/1
10 TABLET ORAL DAILY
Qty: 30 TABLET | Refills: 0 | Status: ON HOLD | OUTPATIENT
Start: 2022-10-18 | End: 2024-03-11

## 2022-10-18 NOTE — PROGRESS NOTES
Subjective:       Patient ID: Cali Mabry is a 57 y.o. male.    Vitals:  weight is 56.7 kg (125 lb). His temperature is 98.3 °F (36.8 °C). His blood pressure is 143/89 (abnormal) and his pulse is 100. His respiration is 18 and oxygen saturation is 97%.     Chief Complaint: URI    57-year-old male presents to clinic for evaluation of cough congestion x3 days.  He is not taking any over-the-counter medications.  He he has vaccinated for COVID, denies any known exposures.  Does report that his niece and nephew have upper respiratory symptoms.  He denies fever.  He is awake and alert, answers questions appropriately, no acute distress noted on today's visit.    Cough  This is a new problem. The current episode started in the past 7 days. The problem has been unchanged. The problem occurs constantly. The cough is Productive of sputum. Associated symptoms include nasal congestion. Pertinent negatives include no chest pain, chills, fever or shortness of breath. He has tried nothing for the symptoms. The treatment provided no relief.     Constitution: Negative for activity change, appetite change, chills, sweating, fatigue and fever.   HENT:  Positive for congestion.    Cardiovascular:  Negative for chest pain.   Respiratory:  Positive for cough and sputum production. Negative for shortness of breath.    Gastrointestinal:  Negative for abdominal pain.   Neurological:  Negative for dizziness.     Objective:      Physical Exam   Constitutional: He is oriented to person, place, and time. He appears well-developed.  Non-toxic appearance. He does not appear ill. No distress.   HENT:   Head: Normocephalic and atraumatic. Head is without abrasion, without contusion and without laceration.   Ears:   Right Ear: External ear normal.   Left Ear: External ear normal.   Nose: Congestion present.   Mouth/Throat: Oropharynx is clear and moist and mucous membranes are normal.   Eyes: Conjunctivae, EOM and lids are normal. Right eye  exhibits no discharge. Left eye exhibits no discharge.   Neck: Trachea normal and phonation normal.   Cardiovascular: Normal rate, regular rhythm and normal heart sounds.   Pulmonary/Chest: Effort normal and breath sounds normal. No stridor. No respiratory distress. He has no wheezes.   Abdominal: Normal appearance.   Musculoskeletal: Normal range of motion.         General: Normal range of motion.   Neurological: He is alert and oriented to person, place, and time.   Skin: Skin is warm, dry, intact, not diaphoretic and not pale. No abrasion, No burn and No ecchymosis   Psychiatric: His speech is normal and behavior is normal. Mood, judgment and thought content normal.   Nursing note and vitals reviewed.      Results for orders placed or performed in visit on 10/18/22   POCT Influenza A/B MOLECULAR   Result Value Ref Range    POC Molecular Influenza A Ag Negative Negative, Not Reported    POC Molecular Influenza B Ag Negative Negative, Not Reported     Acceptable Yes        Assessment:       1. Viral URI with cough          Plan:         Viral URI with cough  -     Cancel: POCT COVID-19 Rapid Screening  -     POCT Influenza A/B MOLECULAR  -     cetirizine (ZYRTEC) 10 MG tablet; Take 1 tablet (10 mg total) by mouth once daily.  Dispense: 30 tablet; Refill: 0  -     guaiFENesin (MUCINEX) 600 mg 12 hr tablet; Take 2 tablets (1,200 mg total) by mouth 2 (two) times daily. for 10 days  Dispense: 40 tablet; Refill: 0  -     dextromethorphan 15 mg/5 mL syrup; Take 5 mLs (15 mg total) by mouth every 4 to 6 hours as needed for Cough.  Dispense: 118 mL; Refill: 0       Patient Instructions   - You must understand that you have received an Urgent Care treatment only and that you may be released before all of your medical problems are known or treated.   - You, the patient, will arrange for follow up care as instructed.   - If your condition worsens or fails to improve we recommend that you receive another  evaluation at the ER immediately or contact your PCP to discuss your concerns or return here.        - Tylenol or Ibuprofen as directed as needed for fever/pain. Avoid tylenol if you have a history of liver disease. Do not take ibuprofen if you have a history of GI bleeding, kidney disease, or if you take blood thinners.   - Take ibuprofen 600-800 mg every 6-8 hours for pain and inflammation.  You can also take Tylenol/acetaminophen 650-1000 mg every 6-8 hours for added pain relief.     - You can take over-the-counter claritin, zyrtec, allegra, or xyzal as directed. These are antihistamines that can help with runny nose, nasal congestion, sneezing, and helps to dry up post-nasal drip, which usually causes sore throat and cough.              - If you do NOT have high blood pressure, you may use a decongestant form (D)  of this medication or if you do not take the D form, you can take sudafed (pseudoephedrine) over the counter, which is a decongestant.     - You can use Flonase (fluticasone) nasal spray as directed for sinus congestion and postnasal drip. This is a steroid nasal spray that works locally over time to decrease the inflammation in your nose/sinuses and help with allergic symptoms. This is not an quick- relief spray like afrin, but it works well if used daily.  Discontinue if you develop nose bleed  - use nasal saline prior to Flonase.     - Use Ocean Spray Nasal Saline 1-3 puffs each nostril every 2-3 hours then blow out onto tissue. This is to irrigate the nasal passage way to clear the sinus openings. Use until sinus problem resolved.     - you can take plain Mucinex (guaifenesin) 1200 mg twice a day to help loosen mucous     -warm salt water gargles can help with sore throat     - warm tea with honey can help with cough. Honey is a natural cough suppressant.     - Follow up with your PCP or specialty clinic as directed in the next 1-2 weeks if not improved or as needed.  You can call [phone] to  schedule an appointment with the appropriate provider.       - Go to the ER if you develop new or worsening symptoms.

## 2022-10-18 NOTE — PATIENT INSTRUCTIONS
- You must understand that you have received an Urgent Care treatment only and that you may be released before all of your medical problems are known or treated.   - You, the patient, will arrange for follow up care as instructed.   - If your condition worsens or fails to improve we recommend that you receive another evaluation at the ER immediately or contact your PCP to discuss your concerns or return here.        - Tylenol or Ibuprofen as directed as needed for fever/pain. Avoid tylenol if you have a history of liver disease. Do not take ibuprofen if you have a history of GI bleeding, kidney disease, or if you take blood thinners.   - Take ibuprofen 600-800 mg every 6-8 hours for pain and inflammation.  You can also take Tylenol/acetaminophen 650-1000 mg every 6-8 hours for added pain relief.     - You can take over-the-counter claritin, zyrtec, allegra, or xyzal as directed. These are antihistamines that can help with runny nose, nasal congestion, sneezing, and helps to dry up post-nasal drip, which usually causes sore throat and cough.              - If you do NOT have high blood pressure, you may use a decongestant form (D)  of this medication or if you do not take the D form, you can take sudafed (pseudoephedrine) over the counter, which is a decongestant.     - You can use Flonase (fluticasone) nasal spray as directed for sinus congestion and postnasal drip. This is a steroid nasal spray that works locally over time to decrease the inflammation in your nose/sinuses and help with allergic symptoms. This is not an quick- relief spray like afrin, but it works well if used daily.  Discontinue if you develop nose bleed  - use nasal saline prior to Flonase.     - Use Ocean Spray Nasal Saline 1-3 puffs each nostril every 2-3 hours then blow out onto tissue. This is to irrigate the nasal passage way to clear the sinus openings. Use until sinus problem resolved.     - you can take plain Mucinex (guaifenesin) 1200 mg  twice a day to help loosen mucous     -warm salt water gargles can help with sore throat     - warm tea with honey can help with cough. Honey is a natural cough suppressant.     - Follow up with your PCP or specialty clinic as directed in the next 1-2 weeks if not improved or as needed.  You can call [phone] to schedule an appointment with the appropriate provider.       - Go to the ER if you develop new or worsening symptoms.

## 2022-11-22 NOTE — SUBJECTIVE & OBJECTIVE
Interval History: pt reports back pain, c/o abdominal pain 5/10    Review of Systems   Constitutional: Negative for activity change.   HENT: Negative for congestion.    Respiratory: Negative for chest tightness and shortness of breath.    Cardiovascular: Negative for chest pain.   Gastrointestinal: Negative for abdominal pain.   Genitourinary: Negative for difficulty urinating.   Musculoskeletal: Negative for arthralgias.     Objective:     Vital Signs (Most Recent):  Temp: 98.9 °F (37.2 °C) (03/16/19 1140)  Pulse: 77 (03/16/19 1140)  Resp: 18 (03/16/19 1140)  BP: 126/87 (03/16/19 1140)  SpO2: 97 % (03/16/19 1140) Vital Signs (24h Range):  Temp:  [97.6 °F (36.4 °C)-99.2 °F (37.3 °C)] 98.9 °F (37.2 °C)  Pulse:  [57-81] 77  Resp:  [18] 18  SpO2:  [96 %-100 %] 97 %  BP: (124-149)/(71-94) 126/87     Weight: 53.1 kg (117 lb)  Body mass index is 18.32 kg/m².    Intake/Output Summary (Last 24 hours) at 3/16/2019 1322  Last data filed at 3/16/2019 0825  Gross per 24 hour   Intake 2360 ml   Output 2125 ml   Net 235 ml      Physical Exam   Constitutional: He is oriented to person, place, and time. He appears well-developed and well-nourished.   HENT:   Head: Normocephalic and atraumatic.   Cardiovascular: Normal rate and regular rhythm.   Pulmonary/Chest: Effort normal.   Neurological: He is alert and oriented to person, place, and time.   Psychiatric: He has a normal mood and affect. His behavior is normal.   Vitals reviewed.      Significant Labs:   Lipase:   Recent Labs   Lab 03/15/19  0500 03/16/19  0437   LIPASE 550* 520*     POCT Glucose:   Recent Labs   Lab 03/16/19  0752 03/16/19  0836 03/16/19  1143   POCTGLUCOSE 32* 162* 132*       Significant Imaging: I have reviewed and interpreted all pertinent imaging results/findings within the past 24 hours.   Length To Time In Minutes Device Was In Place: 10

## 2022-12-12 ENCOUNTER — HOSPITAL ENCOUNTER (EMERGENCY)
Facility: HOSPITAL | Age: 57
Discharge: LEFT BEFORE DISCHARGE | End: 2022-12-12
Attending: STUDENT IN AN ORGANIZED HEALTH CARE EDUCATION/TRAINING PROGRAM
Payer: MEDICARE

## 2022-12-12 VITALS
HEIGHT: 68 IN | RESPIRATION RATE: 16 BRPM | BODY MASS INDEX: 22.73 KG/M2 | SYSTOLIC BLOOD PRESSURE: 152 MMHG | DIASTOLIC BLOOD PRESSURE: 90 MMHG | OXYGEN SATURATION: 99 % | TEMPERATURE: 99 F | WEIGHT: 150 LBS | HEART RATE: 110 BPM

## 2022-12-12 DIAGNOSIS — R73.9 HYPERGLYCEMIA: ICD-10-CM

## 2022-12-12 DIAGNOSIS — Z53.29 LEFT AGAINST MEDICAL ADVICE: ICD-10-CM

## 2022-12-12 DIAGNOSIS — Z76.0 MEDICATION REFILL: Primary | ICD-10-CM

## 2022-12-12 PROBLEM — I25.2 HISTORY OF MI (MYOCARDIAL INFARCTION): Status: ACTIVE | Noted: 2021-05-12

## 2022-12-12 LAB — POCT GLUCOSE: 360 MG/DL (ref 70–110)

## 2022-12-12 PROCEDURE — 99284 EMERGENCY DEPT VISIT MOD MDM: CPT | Mod: 25

## 2022-12-12 PROCEDURE — 82962 GLUCOSE BLOOD TEST: CPT

## 2022-12-12 RX ORDER — LANCETS
EACH MISCELLANEOUS
Qty: 200 EACH | Refills: 0 | Status: SHIPPED | OUTPATIENT
Start: 2022-12-12 | End: 2023-02-02 | Stop reason: CLARIF

## 2022-12-12 RX ORDER — GABAPENTIN 100 MG/1
100 CAPSULE ORAL 3 TIMES DAILY
Qty: 90 CAPSULE | Refills: 0 | Status: SHIPPED | OUTPATIENT
Start: 2022-12-12 | End: 2023-02-02 | Stop reason: CLARIF

## 2022-12-12 RX ORDER — INSULIN LISPRO 100 [IU]/ML
7 INJECTION, SOLUTION INTRAVENOUS; SUBCUTANEOUS
Qty: 15 ML | Refills: 2 | Status: ON HOLD | OUTPATIENT
Start: 2022-12-12 | End: 2023-04-08

## 2022-12-12 NOTE — DISCHARGE INSTRUCTIONS
YOU MAY RETURN AT ANY TIME TO CONTINUE WORKUP AND EVALUATION OF YOUR HIGH BLOOD SUGAR. WE HAVE NOT EXCLUDED LIFE THREATENING INJURY/ILLNESS TODAY. YOU HAVE LEFT AGAINST MEDICAL ADVICE.

## 2022-12-26 PROBLEM — N17.9 AKI (ACUTE KIDNEY INJURY): Status: RESOLVED | Noted: 2022-03-07 | Resolved: 2022-12-26

## 2022-12-26 PROBLEM — E10.10 DIABETIC KETOACIDOSIS WITHOUT COMA ASSOCIATED WITH TYPE 1 DIABETES MELLITUS: Status: RESOLVED | Noted: 2021-11-24 | Resolved: 2022-12-26

## 2023-01-19 NOTE — NURSING
Pt discharged per MD order. IV removed. Catheter tip intact. No distress noted. Discharge instructions explained. AVS given to patient and placed in Blue Folder. Pt verbalized understanding. VSS. Afebrile. No complaints of pain, N/V, diarrhea, or SOB.  Pt left with belongings to Main Entrance via wheelchair per transport. Family with patient.    Negative

## 2023-01-27 ENCOUNTER — HOSPITAL ENCOUNTER (INPATIENT)
Facility: HOSPITAL | Age: 58
LOS: 2 days | Discharge: LEFT BEFORE DISCHARGE | DRG: 638 | End: 2023-01-29
Attending: STUDENT IN AN ORGANIZED HEALTH CARE EDUCATION/TRAINING PROGRAM | Admitting: STUDENT IN AN ORGANIZED HEALTH CARE EDUCATION/TRAINING PROGRAM
Payer: MEDICARE

## 2023-01-27 DIAGNOSIS — E10.10 DIABETIC KETOACIDOSIS WITHOUT COMA ASSOCIATED WITH TYPE 1 DIABETES MELLITUS: Primary | ICD-10-CM

## 2023-01-27 DIAGNOSIS — N17.9 AKI (ACUTE KIDNEY INJURY): ICD-10-CM

## 2023-01-27 DIAGNOSIS — E87.5 HYPERKALEMIA: ICD-10-CM

## 2023-01-27 DIAGNOSIS — E86.0 DEHYDRATION: ICD-10-CM

## 2023-01-27 DIAGNOSIS — B18.2 CHRONIC HEPATITIS C WITHOUT HEPATIC COMA: Chronic | ICD-10-CM

## 2023-01-27 DIAGNOSIS — R73.9 HYPERGLYCEMIA: ICD-10-CM

## 2023-01-27 PROBLEM — D72.829 LEUKOCYTOSIS: Status: ACTIVE | Noted: 2023-01-27

## 2023-01-27 LAB
ALBUMIN SERPL BCP-MCNC: 4.2 G/DL (ref 3.5–5.2)
ALLENS TEST: ABNORMAL
ALP SERPL-CCNC: 139 U/L (ref 55–135)
ALT SERPL W/O P-5'-P-CCNC: 14 U/L (ref 10–44)
ANION GAP SERPL CALC-SCNC: 28 MMOL/L (ref 8–16)
AST SERPL-CCNC: 15 U/L (ref 10–40)
B-OH-BUTYR BLD STRIP-SCNC: 6.6 MMOL/L (ref 0–0.5)
BACTERIA #/AREA URNS HPF: NORMAL /HPF
BASOPHILS # BLD AUTO: 0.05 K/UL (ref 0–0.2)
BASOPHILS NFR BLD: 0.3 % (ref 0–1.9)
BILIRUB SERPL-MCNC: 0.4 MG/DL (ref 0.1–1)
BILIRUB UR QL STRIP: NEGATIVE
BUN SERPL-MCNC: 27 MG/DL (ref 6–20)
CALCIUM SERPL-MCNC: 9.7 MG/DL (ref 8.7–10.5)
CHLORIDE SERPL-SCNC: 91 MMOL/L (ref 95–110)
CLARITY UR: CLEAR
CO2 SERPL-SCNC: 8 MMOL/L (ref 23–29)
COLOR UR: COLORLESS
CREAT SERPL-MCNC: 2 MG/DL (ref 0.5–1.4)
CTP QC/QA: YES
DIFFERENTIAL METHOD: ABNORMAL
EOSINOPHIL # BLD AUTO: 0 K/UL (ref 0–0.5)
EOSINOPHIL NFR BLD: 0.2 % (ref 0–8)
ERYTHROCYTE [DISTWIDTH] IN BLOOD BY AUTOMATED COUNT: 13.2 % (ref 11.5–14.5)
EST. GFR  (NO RACE VARIABLE): 38 ML/MIN/1.73 M^2
GLUCOSE SERPL-MCNC: 737 MG/DL (ref 70–110)
GLUCOSE UR QL STRIP: ABNORMAL
HCO3 UR-SCNC: 11.3 MMOL/L (ref 24–28)
HCT VFR BLD AUTO: 47.9 % (ref 40–54)
HGB BLD-MCNC: 14.9 G/DL (ref 14–18)
HGB UR QL STRIP: NEGATIVE
IMM GRANULOCYTES # BLD AUTO: 0.12 K/UL (ref 0–0.04)
IMM GRANULOCYTES NFR BLD AUTO: 0.6 % (ref 0–0.5)
KETONES UR QL STRIP: ABNORMAL
LEUKOCYTE ESTERASE UR QL STRIP: NEGATIVE
LIPASE SERPL-CCNC: 29 U/L (ref 4–60)
LYMPHOCYTES # BLD AUTO: 1.2 K/UL (ref 1–4.8)
LYMPHOCYTES NFR BLD: 6.1 % (ref 18–48)
MCH RBC QN AUTO: 26.1 PG (ref 27–31)
MCHC RBC AUTO-ENTMCNC: 31.1 G/DL (ref 32–36)
MCV RBC AUTO: 84 FL (ref 82–98)
MICROSCOPIC COMMENT: NORMAL
MONOCYTES # BLD AUTO: 1.5 K/UL (ref 0.3–1)
MONOCYTES NFR BLD: 8 % (ref 4–15)
NEUTROPHILS # BLD AUTO: 15.9 K/UL (ref 1.8–7.7)
NEUTROPHILS NFR BLD: 84.8 % (ref 38–73)
NITRITE UR QL STRIP: NEGATIVE
NRBC BLD-RTO: 0 /100 WBC
PCO2 BLDA: 39.1 MMHG (ref 35–45)
PH SMN: 7.07 [PH] (ref 7.35–7.45)
PH UR STRIP: 5 [PH] (ref 5–8)
PLATELET # BLD AUTO: 420 K/UL (ref 150–450)
PMV BLD AUTO: 9.9 FL (ref 9.2–12.9)
PO2 BLDA: 26 MMHG (ref 40–60)
POC BE: -18 MMOL/L
POC SATURATED O2: 28 % (ref 95–100)
POC TCO2: 12 MMOL/L (ref 24–29)
POCT GLUCOSE: >500 MG/DL (ref 70–110)
POCT GLUCOSE: >500 MG/DL (ref 70–110)
POTASSIUM SERPL-SCNC: 6.8 MMOL/L (ref 3.5–5.1)
PROT SERPL-MCNC: 9.3 G/DL (ref 6–8.4)
PROT UR QL STRIP: ABNORMAL
RBC # BLD AUTO: 5.71 M/UL (ref 4.6–6.2)
SAMPLE: ABNORMAL
SARS-COV-2 RDRP RESP QL NAA+PROBE: NEGATIVE
SITE: ABNORMAL
SODIUM SERPL-SCNC: 127 MMOL/L (ref 136–145)
SP GR UR STRIP: 1.02 (ref 1–1.03)
URN SPEC COLLECT METH UR: ABNORMAL
UROBILINOGEN UR STRIP-ACNC: NEGATIVE EU/DL
WBC # BLD AUTO: 18.74 K/UL (ref 3.9–12.7)
YEAST URNS QL MICRO: NORMAL

## 2023-01-27 PROCEDURE — 82010 KETONE BODYS QUAN: CPT | Performed by: STUDENT IN AN ORGANIZED HEALTH CARE EDUCATION/TRAINING PROGRAM

## 2023-01-27 PROCEDURE — 87635 SARS-COV-2 COVID-19 AMP PRB: CPT | Performed by: STUDENT IN AN ORGANIZED HEALTH CARE EDUCATION/TRAINING PROGRAM

## 2023-01-27 PROCEDURE — 25000003 PHARM REV CODE 250: Performed by: STUDENT IN AN ORGANIZED HEALTH CARE EDUCATION/TRAINING PROGRAM

## 2023-01-27 PROCEDURE — 93010 EKG 12-LEAD: ICD-10-PCS | Mod: ,,, | Performed by: INTERNAL MEDICINE

## 2023-01-27 PROCEDURE — 93005 ELECTROCARDIOGRAM TRACING: CPT

## 2023-01-27 PROCEDURE — 82803 BLOOD GASES ANY COMBINATION: CPT

## 2023-01-27 PROCEDURE — 81000 URINALYSIS NONAUTO W/SCOPE: CPT | Performed by: STUDENT IN AN ORGANIZED HEALTH CARE EDUCATION/TRAINING PROGRAM

## 2023-01-27 PROCEDURE — 83605 ASSAY OF LACTIC ACID: CPT | Performed by: STUDENT IN AN ORGANIZED HEALTH CARE EDUCATION/TRAINING PROGRAM

## 2023-01-27 PROCEDURE — 83690 ASSAY OF LIPASE: CPT | Performed by: STUDENT IN AN ORGANIZED HEALTH CARE EDUCATION/TRAINING PROGRAM

## 2023-01-27 PROCEDURE — 94640 AIRWAY INHALATION TREATMENT: CPT

## 2023-01-27 PROCEDURE — 36415 COLL VENOUS BLD VENIPUNCTURE: CPT | Performed by: STUDENT IN AN ORGANIZED HEALTH CARE EDUCATION/TRAINING PROGRAM

## 2023-01-27 PROCEDURE — 83036 HEMOGLOBIN GLYCOSYLATED A1C: CPT | Performed by: STUDENT IN AN ORGANIZED HEALTH CARE EDUCATION/TRAINING PROGRAM

## 2023-01-27 PROCEDURE — 96374 THER/PROPH/DIAG INJ IV PUSH: CPT

## 2023-01-27 PROCEDURE — 96375 TX/PRO/DX INJ NEW DRUG ADDON: CPT

## 2023-01-27 PROCEDURE — 93010 ELECTROCARDIOGRAM REPORT: CPT | Mod: ,,, | Performed by: INTERNAL MEDICINE

## 2023-01-27 PROCEDURE — 80048 BASIC METABOLIC PNL TOTAL CA: CPT | Performed by: STUDENT IN AN ORGANIZED HEALTH CARE EDUCATION/TRAINING PROGRAM

## 2023-01-27 PROCEDURE — 82962 GLUCOSE BLOOD TEST: CPT

## 2023-01-27 PROCEDURE — 20000000 HC ICU ROOM

## 2023-01-27 PROCEDURE — 63600175 PHARM REV CODE 636 W HCPCS: Performed by: STUDENT IN AN ORGANIZED HEALTH CARE EDUCATION/TRAINING PROGRAM

## 2023-01-27 PROCEDURE — 25000242 PHARM REV CODE 250 ALT 637 W/ HCPCS: Performed by: STUDENT IN AN ORGANIZED HEALTH CARE EDUCATION/TRAINING PROGRAM

## 2023-01-27 PROCEDURE — 80053 COMPREHEN METABOLIC PANEL: CPT | Performed by: STUDENT IN AN ORGANIZED HEALTH CARE EDUCATION/TRAINING PROGRAM

## 2023-01-27 PROCEDURE — 99291 CRITICAL CARE FIRST HOUR: CPT | Mod: 25

## 2023-01-27 PROCEDURE — 25000003 PHARM REV CODE 250: Mod: TB,JG | Performed by: STUDENT IN AN ORGANIZED HEALTH CARE EDUCATION/TRAINING PROGRAM

## 2023-01-27 PROCEDURE — 99900035 HC TECH TIME PER 15 MIN (STAT)

## 2023-01-27 PROCEDURE — 85025 COMPLETE CBC W/AUTO DIFF WBC: CPT | Performed by: STUDENT IN AN ORGANIZED HEALTH CARE EDUCATION/TRAINING PROGRAM

## 2023-01-27 RX ORDER — ALBUTEROL SULFATE 0.83 MG/ML
10 SOLUTION RESPIRATORY (INHALATION) ONCE
Status: COMPLETED | OUTPATIENT
Start: 2023-01-27 | End: 2023-01-27

## 2023-01-27 RX ORDER — FAMOTIDINE 10 MG/ML
20 INJECTION INTRAVENOUS DAILY
Status: DISCONTINUED | OUTPATIENT
Start: 2023-01-28 | End: 2023-01-29 | Stop reason: HOSPADM

## 2023-01-27 RX ORDER — PROCHLORPERAZINE EDISYLATE 5 MG/ML
5 INJECTION INTRAMUSCULAR; INTRAVENOUS EVERY 6 HOURS PRN
Status: DISCONTINUED | OUTPATIENT
Start: 2023-01-27 | End: 2023-01-29 | Stop reason: HOSPADM

## 2023-01-27 RX ORDER — POTASSIUM CHLORIDE 7.45 MG/ML
40 INJECTION INTRAVENOUS
Status: DISCONTINUED | OUTPATIENT
Start: 2023-01-27 | End: 2023-01-29 | Stop reason: HOSPADM

## 2023-01-27 RX ORDER — MAGNESIUM SULFATE HEPTAHYDRATE 40 MG/ML
2 INJECTION, SOLUTION INTRAVENOUS ONCE
Status: COMPLETED | OUTPATIENT
Start: 2023-01-27 | End: 2023-01-27

## 2023-01-27 RX ORDER — ONDANSETRON 2 MG/ML
4 INJECTION INTRAMUSCULAR; INTRAVENOUS EVERY 6 HOURS PRN
Status: DISCONTINUED | OUTPATIENT
Start: 2023-01-27 | End: 2023-01-29 | Stop reason: HOSPADM

## 2023-01-27 RX ORDER — TALC
6 POWDER (GRAM) TOPICAL NIGHTLY PRN
Status: DISCONTINUED | OUTPATIENT
Start: 2023-01-27 | End: 2023-01-28

## 2023-01-27 RX ORDER — BISACODYL 10 MG
10 SUPPOSITORY, RECTAL RECTAL DAILY PRN
Status: DISCONTINUED | OUTPATIENT
Start: 2023-01-27 | End: 2023-01-29 | Stop reason: HOSPADM

## 2023-01-27 RX ORDER — POTASSIUM CHLORIDE 7.45 MG/ML
10 INJECTION INTRAVENOUS ONCE
Status: DISCONTINUED | OUTPATIENT
Start: 2023-01-27 | End: 2023-01-27

## 2023-01-27 RX ORDER — CALCIUM GLUCONATE 20 MG/ML
1 INJECTION, SOLUTION INTRAVENOUS EVERY 10 MIN PRN
Status: DISCONTINUED | OUTPATIENT
Start: 2023-01-27 | End: 2023-01-29 | Stop reason: HOSPADM

## 2023-01-27 RX ORDER — POTASSIUM CHLORIDE 7.45 MG/ML
80 INJECTION INTRAVENOUS
Status: DISCONTINUED | OUTPATIENT
Start: 2023-01-27 | End: 2023-01-29 | Stop reason: HOSPADM

## 2023-01-27 RX ORDER — DEXTROSE MONOHYDRATE 100 MG/ML
INJECTION, SOLUTION INTRAVENOUS
Status: DISCONTINUED | OUTPATIENT
Start: 2023-01-27 | End: 2023-01-29 | Stop reason: HOSPADM

## 2023-01-27 RX ORDER — ONDANSETRON 2 MG/ML
4 INJECTION INTRAMUSCULAR; INTRAVENOUS
Status: COMPLETED | OUTPATIENT
Start: 2023-01-27 | End: 2023-01-27

## 2023-01-27 RX ORDER — ACETAMINOPHEN 325 MG/1
650 TABLET ORAL EVERY 4 HOURS PRN
Status: DISCONTINUED | OUTPATIENT
Start: 2023-01-27 | End: 2023-01-29 | Stop reason: HOSPADM

## 2023-01-27 RX ORDER — NAPROXEN SODIUM 220 MG/1
81 TABLET, FILM COATED ORAL DAILY
Status: DISCONTINUED | OUTPATIENT
Start: 2023-01-28 | End: 2023-01-29 | Stop reason: HOSPADM

## 2023-01-27 RX ORDER — CALCIUM GLUCONATE 20 MG/ML
1 INJECTION, SOLUTION INTRAVENOUS ONCE
Status: COMPLETED | OUTPATIENT
Start: 2023-01-27 | End: 2023-01-27

## 2023-01-27 RX ORDER — SODIUM CHLORIDE 9 MG/ML
125 INJECTION, SOLUTION INTRAVENOUS CONTINUOUS
Status: DISCONTINUED | OUTPATIENT
Start: 2023-01-27 | End: 2023-01-28

## 2023-01-27 RX ORDER — ATORVASTATIN CALCIUM 10 MG/1
20 TABLET, FILM COATED ORAL DAILY
Status: DISCONTINUED | OUTPATIENT
Start: 2023-01-28 | End: 2023-01-29 | Stop reason: HOSPADM

## 2023-01-27 RX ORDER — POTASSIUM CHLORIDE 7.45 MG/ML
60 INJECTION INTRAVENOUS
Status: DISCONTINUED | OUTPATIENT
Start: 2023-01-27 | End: 2023-01-29 | Stop reason: HOSPADM

## 2023-01-27 RX ORDER — DEXTROSE MONOHYDRATE AND SODIUM CHLORIDE 5; .45 G/100ML; G/100ML
125 INJECTION, SOLUTION INTRAVENOUS CONTINUOUS PRN
Status: DISCONTINUED | OUTPATIENT
Start: 2023-01-27 | End: 2023-01-28

## 2023-01-27 RX ORDER — SODIUM CHLORIDE 0.9 % (FLUSH) 0.9 %
10 SYRINGE (ML) INJECTION
Status: DISCONTINUED | OUTPATIENT
Start: 2023-01-27 | End: 2023-01-29 | Stop reason: HOSPADM

## 2023-01-27 RX ORDER — DROPERIDOL 2.5 MG/ML
1.25 INJECTION, SOLUTION INTRAMUSCULAR; INTRAVENOUS ONCE
Status: COMPLETED | OUTPATIENT
Start: 2023-01-27 | End: 2023-01-27

## 2023-01-27 RX ADMIN — INSULIN HUMAN 6 UNITS: 100 INJECTION, SOLUTION PARENTERAL at 08:01

## 2023-01-27 RX ADMIN — MAGNESIUM SULFATE HEPTAHYDRATE 2 G: 40 INJECTION, SOLUTION INTRAVENOUS at 09:01

## 2023-01-27 RX ADMIN — INSULIN HUMAN 6 UNITS/HR: 1 INJECTION, SOLUTION INTRAVENOUS at 09:01

## 2023-01-27 RX ADMIN — ALBUTEROL SULFATE 10 MG: 2.5 SOLUTION RESPIRATORY (INHALATION) at 08:01

## 2023-01-27 RX ADMIN — DROPERIDOL 1.25 MG: 2.5 INJECTION, SOLUTION INTRAMUSCULAR; INTRAVENOUS at 07:01

## 2023-01-27 RX ADMIN — ONDANSETRON 4 MG: 2 INJECTION INTRAMUSCULAR; INTRAVENOUS at 07:01

## 2023-01-27 RX ADMIN — SODIUM CHLORIDE 250 ML: 9 INJECTION, SOLUTION INTRAVENOUS at 08:01

## 2023-01-27 RX ADMIN — SODIUM CHLORIDE 125 ML/HR: 9 INJECTION, SOLUTION INTRAVENOUS at 10:01

## 2023-01-27 RX ADMIN — CALCIUM GLUCONATE 1 G: 20 INJECTION, SOLUTION INTRAVENOUS at 11:01

## 2023-01-27 RX ADMIN — SODIUM CHLORIDE 1000 ML: 9 INJECTION, SOLUTION INTRAVENOUS at 07:01

## 2023-01-28 PROBLEM — R46.89 AGGRESSION: Status: ACTIVE | Noted: 2023-01-28

## 2023-01-28 LAB
ALLENS TEST: ABNORMAL
ANION GAP SERPL CALC-SCNC: 15 MMOL/L (ref 8–16)
ANION GAP SERPL CALC-SCNC: 20 MMOL/L (ref 8–16)
ANION GAP SERPL CALC-SCNC: 22 MMOL/L (ref 8–16)
ANION GAP SERPL CALC-SCNC: 8 MMOL/L (ref 8–16)
BASOPHILS # BLD AUTO: 0.04 K/UL (ref 0–0.2)
BASOPHILS NFR BLD: 0.3 % (ref 0–1.9)
BUN SERPL-MCNC: 21 MG/DL (ref 6–20)
BUN SERPL-MCNC: 24 MG/DL (ref 6–20)
BUN SERPL-MCNC: 25 MG/DL (ref 6–20)
BUN SERPL-MCNC: 25 MG/DL (ref 6–20)
CALCIUM SERPL-MCNC: 7.3 MG/DL (ref 8.7–10.5)
CALCIUM SERPL-MCNC: 8.3 MG/DL (ref 8.7–10.5)
CALCIUM SERPL-MCNC: 9.1 MG/DL (ref 8.7–10.5)
CALCIUM SERPL-MCNC: 9.2 MG/DL (ref 8.7–10.5)
CHLORIDE SERPL-SCNC: 105 MMOL/L (ref 95–110)
CHLORIDE SERPL-SCNC: 109 MMOL/L (ref 95–110)
CHLORIDE SERPL-SCNC: 111 MMOL/L (ref 95–110)
CHLORIDE SERPL-SCNC: 112 MMOL/L (ref 95–110)
CO2 SERPL-SCNC: 15 MMOL/L (ref 23–29)
CO2 SERPL-SCNC: 16 MMOL/L (ref 23–29)
CO2 SERPL-SCNC: 18 MMOL/L (ref 23–29)
CO2 SERPL-SCNC: 7 MMOL/L (ref 23–29)
CREAT SERPL-MCNC: 1.2 MG/DL (ref 0.5–1.4)
CREAT SERPL-MCNC: 1.6 MG/DL (ref 0.5–1.4)
CREAT SERPL-MCNC: 1.6 MG/DL (ref 0.5–1.4)
CREAT SERPL-MCNC: 1.8 MG/DL (ref 0.5–1.4)
DIFFERENTIAL METHOD: ABNORMAL
EOSINOPHIL # BLD AUTO: 0.1 K/UL (ref 0–0.5)
EOSINOPHIL NFR BLD: 0.6 % (ref 0–8)
ERYTHROCYTE [DISTWIDTH] IN BLOOD BY AUTOMATED COUNT: 13.2 % (ref 11.5–14.5)
EST. GFR  (NO RACE VARIABLE): 44 ML/MIN/1.73 M^2
EST. GFR  (NO RACE VARIABLE): 50 ML/MIN/1.73 M^2
EST. GFR  (NO RACE VARIABLE): 50 ML/MIN/1.73 M^2
EST. GFR  (NO RACE VARIABLE): >60 ML/MIN/1.73 M^2
ESTIMATED AVG GLUCOSE: 252 MG/DL (ref 68–131)
GLUCOSE SERPL-MCNC: 162 MG/DL (ref 70–110)
GLUCOSE SERPL-MCNC: 169 MG/DL (ref 70–110)
GLUCOSE SERPL-MCNC: 272 MG/DL (ref 70–110)
GLUCOSE SERPL-MCNC: 490 MG/DL (ref 70–110)
HBA1C MFR BLD: 10.4 % (ref 4–5.6)
HCO3 UR-SCNC: 18.7 MMOL/L (ref 24–28)
HCT VFR BLD AUTO: 44.1 % (ref 40–54)
HGB BLD-MCNC: 13.8 G/DL (ref 14–18)
IMM GRANULOCYTES # BLD AUTO: 0.1 K/UL (ref 0–0.04)
IMM GRANULOCYTES NFR BLD AUTO: 0.7 % (ref 0–0.5)
LACTATE SERPL-SCNC: 3.2 MMOL/L (ref 0.5–2.2)
LACTATE SERPL-SCNC: 5.3 MMOL/L (ref 0.5–2.2)
LACTATE SERPL-SCNC: 6.1 MMOL/L (ref 0.5–2.2)
LYMPHOCYTES # BLD AUTO: 1.4 K/UL (ref 1–4.8)
LYMPHOCYTES NFR BLD: 9.6 % (ref 18–48)
MAGNESIUM SERPL-MCNC: 2.1 MG/DL (ref 1.6–2.6)
MAGNESIUM SERPL-MCNC: 2.5 MG/DL (ref 1.6–2.6)
MCH RBC QN AUTO: 26.1 PG (ref 27–31)
MCHC RBC AUTO-ENTMCNC: 31.3 G/DL (ref 32–36)
MCV RBC AUTO: 83 FL (ref 82–98)
MONOCYTES # BLD AUTO: 1.9 K/UL (ref 0.3–1)
MONOCYTES NFR BLD: 13.5 % (ref 4–15)
NEUTROPHILS # BLD AUTO: 10.7 K/UL (ref 1.8–7.7)
NEUTROPHILS NFR BLD: 75.3 % (ref 38–73)
NRBC BLD-RTO: 0 /100 WBC
PCO2 BLDA: 54.8 MMHG (ref 35–45)
PH SMN: 7.14 [PH] (ref 7.35–7.45)
PHOSPHATE SERPL-MCNC: 2.9 MG/DL (ref 2.7–4.5)
PHOSPHATE SERPL-MCNC: 2.9 MG/DL (ref 2.7–4.5)
PLATELET # BLD AUTO: ABNORMAL K/UL (ref 150–450)
PLATELET BLD QL SMEAR: ABNORMAL
PMV BLD AUTO: ABNORMAL FL (ref 9.2–12.9)
PO2 BLDA: 20 MMHG (ref 40–60)
POC BE: -11 MMOL/L
POC SATURATED O2: 19 % (ref 95–100)
POC TCO2: 20 MMOL/L (ref 24–29)
POCT GLUCOSE: 117 MG/DL (ref 70–110)
POCT GLUCOSE: 193 MG/DL (ref 70–110)
POCT GLUCOSE: 274 MG/DL (ref 70–110)
POCT GLUCOSE: 440 MG/DL (ref 70–110)
POTASSIUM SERPL-SCNC: 3.4 MMOL/L (ref 3.5–5.1)
POTASSIUM SERPL-SCNC: 4.7 MMOL/L (ref 3.5–5.1)
POTASSIUM SERPL-SCNC: 4.9 MMOL/L (ref 3.5–5.1)
POTASSIUM SERPL-SCNC: 5 MMOL/L (ref 3.5–5.1)
RBC # BLD AUTO: 5.29 M/UL (ref 4.6–6.2)
SAMPLE: ABNORMAL
SITE: ABNORMAL
SODIUM SERPL-SCNC: 137 MMOL/L (ref 136–145)
SODIUM SERPL-SCNC: 139 MMOL/L (ref 136–145)
SODIUM SERPL-SCNC: 141 MMOL/L (ref 136–145)
SODIUM SERPL-SCNC: 141 MMOL/L (ref 136–145)
WBC # BLD AUTO: 14.21 K/UL (ref 3.9–12.7)

## 2023-01-28 PROCEDURE — 83605 ASSAY OF LACTIC ACID: CPT | Mod: 91 | Performed by: STUDENT IN AN ORGANIZED HEALTH CARE EDUCATION/TRAINING PROGRAM

## 2023-01-28 PROCEDURE — 36415 COLL VENOUS BLD VENIPUNCTURE: CPT | Performed by: STUDENT IN AN ORGANIZED HEALTH CARE EDUCATION/TRAINING PROGRAM

## 2023-01-28 PROCEDURE — 80048 BASIC METABOLIC PNL TOTAL CA: CPT | Mod: 91 | Performed by: STUDENT IN AN ORGANIZED HEALTH CARE EDUCATION/TRAINING PROGRAM

## 2023-01-28 PROCEDURE — 20000000 HC ICU ROOM

## 2023-01-28 PROCEDURE — 87040 BLOOD CULTURE FOR BACTERIA: CPT | Performed by: INTERNAL MEDICINE

## 2023-01-28 PROCEDURE — 83735 ASSAY OF MAGNESIUM: CPT | Mod: 91 | Performed by: STUDENT IN AN ORGANIZED HEALTH CARE EDUCATION/TRAINING PROGRAM

## 2023-01-28 PROCEDURE — 25000003 PHARM REV CODE 250: Performed by: STUDENT IN AN ORGANIZED HEALTH CARE EDUCATION/TRAINING PROGRAM

## 2023-01-28 PROCEDURE — 84100 ASSAY OF PHOSPHORUS: CPT | Mod: 91 | Performed by: STUDENT IN AN ORGANIZED HEALTH CARE EDUCATION/TRAINING PROGRAM

## 2023-01-28 PROCEDURE — 63600175 PHARM REV CODE 636 W HCPCS: Performed by: STUDENT IN AN ORGANIZED HEALTH CARE EDUCATION/TRAINING PROGRAM

## 2023-01-28 PROCEDURE — 83735 ASSAY OF MAGNESIUM: CPT | Performed by: STUDENT IN AN ORGANIZED HEALTH CARE EDUCATION/TRAINING PROGRAM

## 2023-01-28 PROCEDURE — 83605 ASSAY OF LACTIC ACID: CPT | Performed by: INTERNAL MEDICINE

## 2023-01-28 PROCEDURE — 63600175 PHARM REV CODE 636 W HCPCS: Performed by: INTERNAL MEDICINE

## 2023-01-28 PROCEDURE — 84100 ASSAY OF PHOSPHORUS: CPT | Performed by: STUDENT IN AN ORGANIZED HEALTH CARE EDUCATION/TRAINING PROGRAM

## 2023-01-28 PROCEDURE — S5010 5% DEXTROSE AND 0.45% SALINE: HCPCS | Performed by: STUDENT IN AN ORGANIZED HEALTH CARE EDUCATION/TRAINING PROGRAM

## 2023-01-28 PROCEDURE — 85025 COMPLETE CBC W/AUTO DIFF WBC: CPT | Performed by: STUDENT IN AN ORGANIZED HEALTH CARE EDUCATION/TRAINING PROGRAM

## 2023-01-28 PROCEDURE — 99900035 HC TECH TIME PER 15 MIN (STAT)

## 2023-01-28 PROCEDURE — 87522 HEPATITIS C REVRS TRNSCRPJ: CPT | Performed by: STUDENT IN AN ORGANIZED HEALTH CARE EDUCATION/TRAINING PROGRAM

## 2023-01-28 PROCEDURE — 82803 BLOOD GASES ANY COMBINATION: CPT

## 2023-01-28 RX ORDER — IBUPROFEN 200 MG
16 TABLET ORAL
Status: DISCONTINUED | OUTPATIENT
Start: 2023-01-28 | End: 2023-01-29 | Stop reason: HOSPADM

## 2023-01-28 RX ORDER — INSULIN ASPART 100 [IU]/ML
1-10 INJECTION, SOLUTION INTRAVENOUS; SUBCUTANEOUS
Status: DISCONTINUED | OUTPATIENT
Start: 2023-01-28 | End: 2023-01-29 | Stop reason: HOSPADM

## 2023-01-28 RX ORDER — INSULIN ASPART 100 [IU]/ML
4 INJECTION, SOLUTION INTRAVENOUS; SUBCUTANEOUS
Status: DISCONTINUED | OUTPATIENT
Start: 2023-01-28 | End: 2023-01-29 | Stop reason: HOSPADM

## 2023-01-28 RX ORDER — ENOXAPARIN SODIUM 100 MG/ML
40 INJECTION SUBCUTANEOUS EVERY 24 HOURS
Status: DISCONTINUED | OUTPATIENT
Start: 2023-01-28 | End: 2023-01-29 | Stop reason: HOSPADM

## 2023-01-28 RX ORDER — DEXTROSE MONOHYDRATE AND SODIUM CHLORIDE 5; .45 G/100ML; G/100ML
INJECTION, SOLUTION INTRAVENOUS CONTINUOUS
Status: DISCONTINUED | OUTPATIENT
Start: 2023-01-28 | End: 2023-01-28

## 2023-01-28 RX ORDER — MUPIROCIN 20 MG/G
OINTMENT TOPICAL 2 TIMES DAILY
Status: DISCONTINUED | OUTPATIENT
Start: 2023-01-28 | End: 2023-01-29 | Stop reason: HOSPADM

## 2023-01-28 RX ORDER — GLUCAGON 1 MG
1 KIT INJECTION
Status: DISCONTINUED | OUTPATIENT
Start: 2023-01-28 | End: 2023-01-29 | Stop reason: HOSPADM

## 2023-01-28 RX ORDER — GABAPENTIN 100 MG/1
200 CAPSULE ORAL 3 TIMES DAILY
Status: DISCONTINUED | OUTPATIENT
Start: 2023-01-28 | End: 2023-01-29 | Stop reason: HOSPADM

## 2023-01-28 RX ORDER — INSULIN ASPART 100 [IU]/ML
1-10 INJECTION, SOLUTION INTRAVENOUS; SUBCUTANEOUS
Status: DISCONTINUED | OUTPATIENT
Start: 2023-01-28 | End: 2023-01-28

## 2023-01-28 RX ORDER — SODIUM CHLORIDE 9 MG/ML
INJECTION, SOLUTION INTRAVENOUS CONTINUOUS
Status: DISCONTINUED | OUTPATIENT
Start: 2023-01-28 | End: 2023-01-29 | Stop reason: HOSPADM

## 2023-01-28 RX ORDER — SODIUM CHLORIDE 9 MG/ML
INJECTION, SOLUTION INTRAVENOUS CONTINUOUS
Status: DISCONTINUED | OUTPATIENT
Start: 2023-01-28 | End: 2023-01-28

## 2023-01-28 RX ORDER — IBUPROFEN 200 MG
24 TABLET ORAL
Status: DISCONTINUED | OUTPATIENT
Start: 2023-01-28 | End: 2023-01-29 | Stop reason: HOSPADM

## 2023-01-28 RX ORDER — TALC
6 POWDER (GRAM) TOPICAL NIGHTLY
Status: DISCONTINUED | OUTPATIENT
Start: 2023-01-28 | End: 2023-01-29 | Stop reason: HOSPADM

## 2023-01-28 RX ORDER — INSULIN ASPART 100 [IU]/ML
4 INJECTION, SOLUTION INTRAVENOUS; SUBCUTANEOUS
Status: DISCONTINUED | OUTPATIENT
Start: 2023-01-28 | End: 2023-01-28

## 2023-01-28 RX ADMIN — MUPIROCIN: 20 OINTMENT TOPICAL at 08:01

## 2023-01-28 RX ADMIN — GABAPENTIN 200 MG: 100 CAPSULE ORAL at 05:01

## 2023-01-28 RX ADMIN — INSULIN ASPART 10 UNITS: 100 INJECTION, SOLUTION INTRAVENOUS; SUBCUTANEOUS at 04:01

## 2023-01-28 RX ADMIN — ACETAMINOPHEN 650 MG: 325 TABLET ORAL at 09:01

## 2023-01-28 RX ADMIN — SODIUM CHLORIDE 1000 ML: 9 INJECTION, SOLUTION INTRAVENOUS at 08:01

## 2023-01-28 RX ADMIN — INSULIN ASPART 4 UNITS: 100 INJECTION, SOLUTION INTRAVENOUS; SUBCUTANEOUS at 08:01

## 2023-01-28 RX ADMIN — SODIUM CHLORIDE: 9 INJECTION, SOLUTION INTRAVENOUS at 02:01

## 2023-01-28 RX ADMIN — FAMOTIDINE 20 MG: 10 INJECTION INTRAVENOUS at 09:01

## 2023-01-28 RX ADMIN — DEXTROSE AND SODIUM CHLORIDE 125 ML/HR: 5; 450 INJECTION, SOLUTION INTRAVENOUS at 06:01

## 2023-01-28 RX ADMIN — INSULIN ASPART 4 UNITS: 100 INJECTION, SOLUTION INTRAVENOUS; SUBCUTANEOUS at 04:01

## 2023-01-28 RX ADMIN — GABAPENTIN 200 MG: 100 CAPSULE ORAL at 08:01

## 2023-01-28 RX ADMIN — ASPIRIN 81 MG CHEWABLE TABLET 81 MG: 81 TABLET CHEWABLE at 09:01

## 2023-01-28 RX ADMIN — MUPIROCIN: 20 OINTMENT TOPICAL at 09:01

## 2023-01-28 RX ADMIN — INSULIN DETEMIR 14 UNITS: 100 INJECTION, SOLUTION SUBCUTANEOUS at 02:01

## 2023-01-28 RX ADMIN — VALPROATE SODIUM 250 MG: 100 INJECTION, SOLUTION INTRAVENOUS at 08:01

## 2023-01-28 RX ADMIN — INSULIN HUMAN 1.5 UNITS/HR: 1 INJECTION, SOLUTION INTRAVENOUS at 07:01

## 2023-01-28 RX ADMIN — INSULIN ASPART 4 UNITS: 100 INJECTION, SOLUTION INTRAVENOUS; SUBCUTANEOUS at 02:01

## 2023-01-28 RX ADMIN — VALPROATE SODIUM 250 MG: 100 INJECTION, SOLUTION INTRAVENOUS at 09:01

## 2023-01-28 RX ADMIN — Medication 6 MG: at 08:01

## 2023-01-28 RX ADMIN — ATORVASTATIN CALCIUM 20 MG: 10 TABLET, FILM COATED ORAL at 09:01

## 2023-01-28 RX ADMIN — SODIUM CHLORIDE, POTASSIUM CHLORIDE, SODIUM LACTATE AND CALCIUM CHLORIDE 1000 ML: 600; 310; 30; 20 INJECTION, SOLUTION INTRAVENOUS at 01:01

## 2023-01-28 RX ADMIN — SODIUM CHLORIDE: 9 INJECTION, SOLUTION INTRAVENOUS at 09:01

## 2023-01-28 RX ADMIN — DEXTROSE AND SODIUM CHLORIDE: 5; 450 INJECTION, SOLUTION INTRAVENOUS at 08:01

## 2023-01-28 RX ADMIN — POTASSIUM CHLORIDE 60 MEQ: 7.46 INJECTION, SOLUTION INTRAVENOUS at 02:01

## 2023-01-28 NOTE — PROVIDER TRANSFER
DKA + RAMYA    Patient refusing further finger sticks, insulin gtt paused.  Next set of labs essentially out of DKA anyhow, may have run a couple hours longer but will convert to subcutaneous insulin and let him eat.   Able to remove restraints after depakote use      Titrate insulin  Continue fluids overnight  F/u labs in am, if stable may be able to discharge  Consider Depakote 250 mg q24h daily prescription at discharge         Jose Mitchell MD  Internal Medicine Staff

## 2023-01-28 NOTE — EICU
Lactic acid 5.3    Anion gap 22<-28  Creatinine 1.6<-2  PH 7.07(7 hours ago)    Received 1.5L so far   WBC 18.74  No fever noted  /63      Plan:  LR 1L bolus now  Recheck lactate in 4 hours  Blood culturex2 ordered.  No antibiotics for now.  Recheck PH .

## 2023-01-28 NOTE — H&P
[facility] Medicine  History & Physical    Patient Name: Cali Mabry  MRN: [id number]  Patient Class: IP- Inpatient  Admission Date: 2023  Attending Physician: Jose Mitchell MD   Primary Care Provider: Primary Doctor No         Patient information was obtained from patient, past medical records and ER records.     Subjective:     Principal Problem:Diabetic ketoacidosis without coma associated with type 1 diabetes mellitus    Chief Complaint:   Chief Complaint   Patient presents with    Hyperglycemia     EMS called to 58yo male that is non-complaint with his meds and cbg is reading high. Has N/V and abdominal pain, polydipsia, and polyuria.         HPI: This is a 57 year old male with a PMHx of T1DM, COPD (not on O2), CAD, HTN, HLD, substance use (including IVDU), mood disorder who presented with hyperglycemia.     Patient reports feeling unwell for the last 3 days. Reportedly he last took his insulin around that time. He is lethargic during the interview but responsive. He reports having nausea, vomiting and diarrhea. He reports recent drug use but did not specify when/what the last time he used. In the ED, he was tachycardic, tachypnic. Labs were suggestive of DKA (PH: 7.0, HCO3: 12, B, K: 6.8, A), and showed leukocytosis (18.7), elevated creatinine (2.0 - baseline of 1.0), hyponatremia (127). CXR showed no acute process. He was given fluids and wa started on an insulin ggt. The patient was admitted to the ICU for further management.         Interval History:   NAEON other than aggression.   Per report, he is often aggressive.  Polite to MD, but not to RN's.  Explained DKA and need to keep up with insulin.     Review of Systems   Constitutional:  Positive for activity change, appetite change and fatigue. Negative for fever and unexpected weight change.   HENT:  Negative for trouble swallowing and voice change.    Eyes:  Negative for photophobia and visual disturbance.    Respiratory:  Negative for cough and shortness of breath.    Cardiovascular:  Negative for chest pain, palpitations and leg swelling.   Gastrointestinal:  Positive for abdominal pain, nausea and vomiting. Negative for abdominal distention, blood in stool, constipation and diarrhea.   Endocrine: Positive for polydipsia and polyuria.   Genitourinary:  Negative for difficulty urinating and dysuria.   Musculoskeletal:  Negative for neck pain and neck stiffness.   Skin:  Negative for pallor and rash.   Allergic/Immunologic: Negative for immunocompromised state.   Neurological:  Positive for light-headedness. Negative for seizures, syncope, facial asymmetry and weakness.   Psychiatric/Behavioral:  Positive for agitation and confusion. Negative for behavioral problems.        Objective:     Vital Signs (Most Recent):  Temp: 98.5 °F (36.9 °C) (01/28/23 0600)  Pulse: 95 (01/28/23 0600)  Resp: 15 (01/28/23 0600)  BP: 134/67 (01/28/23 0600)  SpO2: 99 % (01/28/23 0600) Vital Signs (24h Range):  Temp:  [97.8 °F (36.6 °C)-98.6 °F (37 °C)] 98.5 °F (36.9 °C)  Pulse:  [] 95  Resp:  [15-24] 15  SpO2:  [97 %-100 %] 99 %  BP: (113-162)/(55-86) 134/67     Weight: 55.8 kg (123 lb 0.3 oz)  Body mass index is 18.7 kg/m².    Intake/Output Summary (Last 24 hours) at 1/28/2023 0817  Last data filed at 1/28/2023 0600  Gross per 24 hour   Intake 2428.56 ml   Output --   Net 2428.56 ml      Physical Exam  Vitals and nursing note reviewed.   Constitutional:       General: He is in acute distress.      Appearance: He is well-developed and normal weight. He is ill-appearing, toxic-appearing and diaphoretic.   HENT:      Head: Normocephalic and atraumatic.      Nose: Nose normal. No congestion.      Mouth/Throat:      Mouth: Mucous membranes are dry.      Pharynx: No oropharyngeal exudate.   Eyes:      General: No scleral icterus.     Pupils: Pupils are equal, round, and reactive to light.   Neck:      Thyroid: No thyromegaly.    Cardiovascular:      Rate and Rhythm: Regular rhythm. Tachycardia present.      Heart sounds:     No friction rub. No gallop.   Pulmonary:      Effort: Pulmonary effort is normal.      Breath sounds: Normal breath sounds. No stridor. No wheezing or rales.   Abdominal:      General: There is no distension.      Palpations: Abdomen is soft.      Tenderness: There is abdominal tenderness. There is guarding.   Musculoskeletal:         General: No swelling or deformity. Normal range of motion.      Cervical back: Normal range of motion. No rigidity.      Right lower leg: No edema.      Left lower leg: No edema.   Lymphadenopathy:      Cervical: No cervical adenopathy.   Skin:     General: Skin is warm.      Capillary Refill: Capillary refill takes less than 2 seconds.      Coloration: Skin is not jaundiced.      Findings: No bruising.   Neurological:      Mental Status: He is alert and oriented to person, place, and time.      Cranial Nerves: No cranial nerve deficit.      Motor: No weakness.   Psychiatric:         Mood and Affect: Mood normal.         Behavior: Behavior normal.         Thought Content: Thought content normal.         Judgment: Judgment normal.           Recent Results (from the past 24 hour(s))   POCT glucose    Collection Time: 01/27/23  7:03 PM   Result Value Ref Range    POCT Glucose >500 (H) 70 - 110 mg/dL   CBC auto differential    Collection Time: 01/27/23  7:07 PM   Result Value Ref Range    WBC 18.74 (H) 3.90 - 12.70 K/uL    RBC 5.71 4.60 - 6.20 M/uL    Hemoglobin 14.9 14.0 - 18.0 g/dL    Hematocrit 47.9 40.0 - 54.0 %    MCV 84 82 - 98 fL    MCH 26.1 (L) 27.0 - 31.0 pg    MCHC 31.1 (L) 32.0 - 36.0 g/dL    RDW 13.2 11.5 - 14.5 %    Platelets 420 150 - 450 K/uL    MPV 9.9 9.2 - 12.9 fL    Immature Granulocytes 0.6 (H) 0.0 - 0.5 %    Gran # (ANC) 15.9 (H) 1.8 - 7.7 K/uL    Immature Grans (Abs) 0.12 (H) 0.00 - 0.04 K/uL    Lymph # 1.2 1.0 - 4.8 K/uL    Mono # 1.5 (H) 0.3 - 1.0 K/uL    Eos # 0.0  0.0 - 0.5 K/uL    Baso # 0.05 0.00 - 0.20 K/uL    nRBC 0 0 /100 WBC    Gran % 84.8 (H) 38.0 - 73.0 %    Lymph % 6.1 (L) 18.0 - 48.0 %    Mono % 8.0 4.0 - 15.0 %    Eosinophil % 0.2 0.0 - 8.0 %    Basophil % 0.3 0.0 - 1.9 %    Differential Method Automated    Comprehensive metabolic panel    Collection Time: 01/27/23  7:07 PM   Result Value Ref Range    Sodium 127 (L) 136 - 145 mmol/L    Potassium 6.8 (HH) 3.5 - 5.1 mmol/L    Chloride 91 (L) 95 - 110 mmol/L    CO2 8 (LL) 23 - 29 mmol/L    Glucose 737 (HH) 70 - 110 mg/dL    BUN 27 (H) 6 - 20 mg/dL    Creatinine 2.0 (H) 0.5 - 1.4 mg/dL    Calcium 9.7 8.7 - 10.5 mg/dL    Total Protein 9.3 (H) 6.0 - 8.4 g/dL    Albumin 4.2 3.5 - 5.2 g/dL    Total Bilirubin 0.4 0.1 - 1.0 mg/dL    Alkaline Phosphatase 139 (H) 55 - 135 U/L    AST 15 10 - 40 U/L    ALT 14 10 - 44 U/L    Anion Gap 28 (H) 8 - 16 mmol/L    eGFR 38 (A) >60 mL/min/1.73 m^2   Beta - Hydroxybutyrate, Serum    Collection Time: 01/27/23  7:07 PM   Result Value Ref Range    Beta-Hydroxybutyrate 6.6 (H) 0.0 - 0.5 mmol/L   Lipase    Collection Time: 01/27/23  7:09 PM   Result Value Ref Range    Lipase 29 4 - 60 U/L   ISTAT PROCEDURE    Collection Time: 01/27/23  7:24 PM   Result Value Ref Range    POC PH 7.070 (L) 7.35 - 7.45    POC PCO2 39.1 35 - 45 mmHg    POC PO2 26 (L) 40 - 60 mmHg    POC HCO3 11.3 (L) 24 - 28 mmol/L    POC BE -18 -2 to 2 mmol/L    POC SATURATED O2 28 (L) 95 - 100 %    POC TCO2 12 (L) 24 - 29 mmol/L    Sample VENOUS     Site Other     Allens Test N/A    Urinalysis, Reflex to Urine Culture Urine, Clean Catch    Collection Time: 01/27/23  9:27 PM    Specimen: Urine   Result Value Ref Range    Specimen UA Urine, Clean Catch     Color, UA Colorless (A) Yellow, Straw, Monet    Appearance, UA Clear Clear    pH, UA 5.0 5.0 - 8.0    Specific Gravity, UA 1.020 1.005 - 1.030    Protein, UA Trace (A) Negative    Glucose, UA 4+ (A) Negative    Ketones, UA 3+ (A) Negative    Bilirubin (UA) Negative Negative     Occult Blood UA Negative Negative    Nitrite, UA Negative Negative    Urobilinogen, UA Negative <2.0 EU/dL    Leukocytes, UA Negative Negative   Urinalysis Microscopic    Collection Time: 01/27/23  9:27 PM   Result Value Ref Range    Bacteria Rare None-Occ /hpf    Yeast, UA None None    Microscopic Comment SEE COMMENT    POCT COVID-19 Rapid Screening    Collection Time: 01/27/23 10:17 PM   Result Value Ref Range    POC Rapid COVID Negative Negative     Acceptable Yes    POCT glucose    Collection Time: 01/27/23 10:29 PM   Result Value Ref Range    POCT Glucose >500 (H) 70 - 110 mg/dL   Basic metabolic panel    Collection Time: 01/27/23 11:50 PM   Result Value Ref Range    Sodium 141 136 - 145 mmol/L    Potassium 3.4 (L) 3.5 - 5.1 mmol/L    Chloride 112 (H) 95 - 110 mmol/L    CO2 7 (LL) 23 - 29 mmol/L    Glucose 490 (HH) 70 - 110 mg/dL    BUN 25 (H) 6 - 20 mg/dL    Creatinine 1.6 (H) 0.5 - 1.4 mg/dL    Calcium 7.3 (L) 8.7 - 10.5 mg/dL    Anion Gap 22 (H) 8 - 16 mmol/L    eGFR 50 (A) >60 mL/min/1.73 m^2   Lactic acid, plasma    Collection Time: 01/27/23 11:50 PM   Result Value Ref Range    Lactate (Lactic Acid) 5.3 (HH) 0.5 - 2.2 mmol/L   ISTAT PROCEDURE    Collection Time: 01/28/23  3:38 AM   Result Value Ref Range    POC PH 7.141 (L) 7.35 - 7.45    POC PCO2 54.8 (H) 35 - 45 mmHg    POC PO2 20 (L) 40 - 60 mmHg    POC HCO3 18.7 (L) 24 - 28 mmol/L    POC BE -11 -2 to 2 mmol/L    POC SATURATED O2 19 (L) 95 - 100 %    POC TCO2 20 (L) 24 - 29 mmol/L    Sample VENOUS     Site Other     Allens Test N/A    Basic metabolic panel    Collection Time: 01/28/23  3:39 AM   Result Value Ref Range    Sodium 141 136 - 145 mmol/L    Potassium 4.9 3.5 - 5.1 mmol/L    Chloride 105 95 - 110 mmol/L    CO2 16 (L) 23 - 29 mmol/L    Glucose 272 (H) 70 - 110 mg/dL    BUN 25 (H) 6 - 20 mg/dL    Creatinine 1.8 (H) 0.5 - 1.4 mg/dL    Calcium 9.2 8.7 - 10.5 mg/dL    Anion Gap 20 (H) 8 - 16 mmol/L    eGFR 44 (A) >60 mL/min/1.73 m^2    Phosphorus    Collection Time: 01/28/23  3:39 AM   Result Value Ref Range    Phosphorus 2.9 2.7 - 4.5 mg/dL   Magnesium    Collection Time: 01/28/23  3:39 AM   Result Value Ref Range    Magnesium 2.5 1.6 - 2.6 mg/dL   Basic metabolic panel    Collection Time: 01/28/23  6:59 AM   Result Value Ref Range    Sodium 139 136 - 145 mmol/L    Potassium 5.0 3.5 - 5.1 mmol/L    Chloride 109 95 - 110 mmol/L    CO2 15 (L) 23 - 29 mmol/L    Glucose 162 (H) 70 - 110 mg/dL    BUN 24 (H) 6 - 20 mg/dL    Creatinine 1.6 (H) 0.5 - 1.4 mg/dL    Calcium 9.1 8.7 - 10.5 mg/dL    Anion Gap 15 8 - 16 mmol/L    eGFR 50 (A) >60 mL/min/1.73 m^2   CBC auto differential    Collection Time: 01/28/23  6:59 AM   Result Value Ref Range    WBC 14.21 (H) 3.90 - 12.70 K/uL    RBC 5.29 4.60 - 6.20 M/uL    Hemoglobin 13.8 (L) 14.0 - 18.0 g/dL    Hematocrit 44.1 40.0 - 54.0 %    MCV 83 82 - 98 fL    MCH 26.1 (L) 27.0 - 31.0 pg    MCHC 31.3 (L) 32.0 - 36.0 g/dL    RDW 13.2 11.5 - 14.5 %    Platelets SEE COMMENT 150 - 450 K/uL    MPV SEE COMMENT 9.2 - 12.9 fL    Immature Granulocytes 0.7 (H) 0.0 - 0.5 %    Gran # (ANC) 10.7 (H) 1.8 - 7.7 K/uL    Immature Grans (Abs) 0.10 (H) 0.00 - 0.04 K/uL    Lymph # 1.4 1.0 - 4.8 K/uL    Mono # 1.9 (H) 0.3 - 1.0 K/uL    Eos # 0.1 0.0 - 0.5 K/uL    Baso # 0.04 0.00 - 0.20 K/uL    nRBC 0 0 /100 WBC    Gran % 75.3 (H) 38.0 - 73.0 %    Lymph % 9.6 (L) 18.0 - 48.0 %    Mono % 13.5 4.0 - 15.0 %    Eosinophil % 0.6 0.0 - 8.0 %    Basophil % 0.3 0.0 - 1.9 %    Platelet Estimate Clumped (A)     Differential Method Automated    Lactic acid, plasma    Collection Time: 01/28/23  6:59 AM   Result Value Ref Range    Lactate (Lactic Acid) 6.1 (HH) 0.5 - 2.2 mmol/L       Microbiology Results (last 7 days)       Procedure Component Value Units Date/Time    Blood culture [[id number]] Collected: 01/28/23 0659    Order Status: Sent Specimen: Blood Updated: 01/28/23 0711    Narrative:      Collection has been rescheduled by [email or web address] at  2023 03:34 Reason:   Patient unavailable.nurse will call the lab when ready.  Collection has been rescheduled by BRUNA at 2023 03:42 Reason: Pt   is extremley combative. RN requested lab try again later  Collection has been rescheduled by LB3 at 2023 03:34 Reason:   Patient unavailable.nurse will call the lab when ready.  Collection has been rescheduled by BRUNA at 2023 03:42 Reason: Pt   is extremley combative. RN requested lab try again later             Imaging Results              X-Ray Chest AP Portable (Final result)  Result time 23 19:58:26      Final result by Linh Gordon MD (23 19:58:26)                   Impression:      No acute cardiopulmonary process identified.      Electronically signed by: Linh Gordon MD  Date:    2023  Time:    19:58               Narrative:    EXAMINATION:  XR CHEST AP PORTABLE    CLINICAL HISTORY:  hyperglycemia;    TECHNIQUE:  Single frontal view of the chest was performed.    COMPARISON:  2022.    FINDINGS:  Cardiac silhouette is normal in size.  Lungs are symmetrically expanded.  No evidence of focal consolidative process, pneumothorax, or significant pleural effusion.  There is chronic blunting of the right costophrenic angle.  No acute osseous abnormality identified.                                          Assessment/Plan:     * Diabetic ketoacidosis without coma associated with type 1 diabetes mellitus  - Patient with a history of diabetes   Lab Results   Component Value Date    HGBA1C 10.8 (H) 2022     - Labs were suggestive of DKA (PH: 7.0, HCO3: 12, B, K: 6.8, A)  - Treated with IVF, Insulin drip, electrolyte mangament in ED.   - Likely secondary to medication non compliance     Plan:  · Insulin infusion initiated   IF Glucose is >250, run 0.9% NS at 125 mL/hr along with insulin infusion.  · IF Glucose is < 250, run D5+0.45% NS at 125 mL/hr along with insulin infusion.  · Q.4 BMP, Ph, Mg  checks  · Q 1 hour glucose checks    Chronic hepatitis C  · Patient with active hepatitis-C as recently as a year ago, no apparent hepatic dysfunction at this time based on LFTs  · As of 2020 he is not cleared his hepatitis naturally, will recheck in our Na while he is here   · Will need referral at discharge to ID or hepatology      Aggression  · Aggressive with nursing staff  · Will give depakote to potentially remove soft restraints      Leukocytosis  Likely reactive   Monitor     RAMYA (acute kidney injury)  Patient with acute kidney injury likely due to pre-renal azotemia RAMYA is currently stable. Labs reviewed- Renal function/electrolytes with Estimated Creatinine Clearance: 40.7 mL/min (A) (based on SCr of 1.6 mg/dL (H)). according to latest data. Monitor urine output and serial BMP and adjust therapy as needed. Avoid nephrotoxins and renally dose meds for GFR listed above.   -Continue fluids    CAD (coronary artery disease)  Resume ASA, statin       COPD (chronic obstructive pulmonary disease)  Not in exacerbation   PRN Duo-Nebs     Hyponatremia  In the setting of hyperglycemia. Monitor       Hyperkalemia  Treat medically   Monitor BMPs      Polysubstance use disorder  Need to be counseled on cessation once less lethargic       Hyperlipidemia  Resume statin       Essential hypertension  Monitor BP        VTE Risk Mitigation (From admission, onward)         Ordered     IP VTE LOW RISK PATIENT  Once         01/27/23 1948     Place sequential compression device  Until discontinued         01/27/23 1948              Critical care time spent on the evaluation and treatment of severe organ dysfunction, review of pertinent labs and imaging studies, discussions with consulting providers and discussions with patient/family: 35 minutes.     Jose Mitchell MD  Department of Hospital Medicine   [facility] [place] - Intensive Care

## 2023-01-28 NOTE — HOSPITAL COURSE
Admitted with DKA [calculated anion gap 36, bicarb 8, glucose 737, beta hydroxybutyrate 6.6, and pH 7.07]. Lactic acid 6 with a leukocytosis of 18 K. Placed in ICU on IV insulin and DKA protocol.  Patient aggressive and physical was staff, has done this in the past physical restraints ordered.  Will give him low-dose Depakote Q 12 in hopes of removing physical restraints.    On 1/29, the patient became more agitated and requested to be discharged. He understood the risks of leaving against medical advice and was able to verbalize and repeat back the risks and potential consequences. See care update note.

## 2023-01-28 NOTE — ED TRIAGE NOTES
Pt AAOX3, non-diaphoretic, dry mouth, abd pain, abd soft, non-distended, denies any other complaints.

## 2023-01-28 NOTE — PROGRESS NOTES
"To patient's room to discuss how he would manage his care at home.  Verified patient with name and .  Afterwards patient asked what was I there for.  CM explained again.  Patient stated, "I don't want to talk.  I have been doing this for 40 years.  I know what I am doing.  Now get out and have a nice day".  CM will attempt at a later time.  "

## 2023-01-28 NOTE — ASSESSMENT & PLAN NOTE
- Patient with a history of diabetes   Lab Results   Component Value Date    HGBA1C 10.8 (H) 2022     - Labs were suggestive of DKA (PH: 7.0, HCO3: 12, B, K: 6.8, A)  - Treated with IVF, Insulin drip, electrolyte mangament in ED.   - Likely secondary to medication non compliance     Plan:  · Insulin infusion initiated   IF Glucose is >250, run 0.9% NS at 125 mL/hr along with insulin infusion.  · IF Glucose is < 250, run D5+0.45% NS at 125 mL/hr along with insulin infusion.  · Q.4 BMP, Ph, Mg checks  · Q 1 hour glucose checks

## 2023-01-28 NOTE — ASSESSMENT & PLAN NOTE
· Patient with active hepatitis-C as recently as a year ago, no apparent hepatic dysfunction at this time based on LFTs  · As of 2020 he is not cleared his hepatitis naturally, will recheck in our Na while he is here   · Will need referral at discharge to ID or hepatology

## 2023-01-28 NOTE — NURSING
Nurses Note -- 4 Eyes      1/28/2023   5:29 PM      Skin assessed during: Daily Assessment      [x] No Pressure Injuries Present    [x]Prevention Measures Documented      [] Yes- Altered Skin Integrity Present or Discovered   [] LDA Added if Not in Epic (Describe Wound)   [] New Altered Skin Integrity was Present on Admit and Documented in LDA   [] Wound Image Taken    Wound Care Consulted? No    Attending Nurse:  Alannah Reynolds RN     Second RN/Staff Member:  Sumeet Ding RN

## 2023-01-28 NOTE — ASSESSMENT & PLAN NOTE
- Patient with a history of diabetes   Lab Results   Component Value Date    HGBA1C 10.8 (H) 2022     - Labs were suggestive of DKA (PH: 7.0, HCO3: 12, B, K: 6.8, A)  - Treated with IVF, Insulin drip, electrolyte mangament in ED.   - Likely secondary to medication non compliance     Plan:  - Initiate and maintain DKA protocol   - IVF

## 2023-01-28 NOTE — PROGRESS NOTES
[facility] Medicine  Progress Note    Patient Name: Cali Mabry  MRN: [id number]  Patient Class: IP- Inpatient   Admission Date: 2023  Length of Stay: 1 days  Attending Physician: Jose Mitchell MD  Primary Care Provider: Primary Doctor No        Subjective:     Principal Problem:Diabetic ketoacidosis without coma associated with type 1 diabetes mellitus        HPI:  This is a 57 year old male with a PMHx of T1DM, COPD (not on O2), CAD, HTN, HLD, substance use (including IVDU), mood disorder who presented with hyperglycemia.     Patient reports feeling unwell for the last 3 days. Reportedly he last took his insulin around that time. He is lethargic during the interview but responsive. He reports having nausea, vomiting and diarrhea. He reports recent drug use but did not specify when/what the last time he used. In the ED, he was tachycardic, tachypnic. Labs were suggestive of DKA (PH: 7.0, HCO3: 12, B, K: 6.8, A), and showed leukocytosis (18.7), elevated creatinine (2.0 - baseline of 1.0), hyponatremia (127). CXR showed no acute process. He was given fluids and wa started on an insulin ggt. The patient was admitted to the ICU for further management.         Overview/Hospital Course:  Admitted with DKA [calculated anion gap 36, bicarb 8, glucose 737, beta hydroxybutyrate 6.6, and pH 7.07]. Lactic acid 6 with a leukocytosis of 18 K. Placed in ICU on IV insulin and DKA protocol.  Patient aggressive and physical was staff, has done this in the past physical restraints ordered.  Will give him low-dose Depakote Q 12 in hopes of removing physical restraints.      Interval History:   NAEON other than aggression.   Per report, he is often aggressive.  Polite to MD, but not to RN's.  Explained DKA and need to keep up with insulin.     Review of Systems   Constitutional:  Positive for activity change, appetite change and fatigue. Negative for fever and unexpected weight change.    HENT:  Negative for trouble swallowing and voice change.    Eyes:  Negative for photophobia and visual disturbance.   Respiratory:  Negative for cough and shortness of breath.    Cardiovascular:  Negative for chest pain, palpitations and leg swelling.   Gastrointestinal:  Positive for abdominal pain, nausea and vomiting. Negative for abdominal distention, blood in stool, constipation and diarrhea.   Endocrine: Positive for polydipsia and polyuria.   Genitourinary:  Negative for difficulty urinating and dysuria.   Musculoskeletal:  Negative for neck pain and neck stiffness.   Skin:  Negative for pallor and rash.   Allergic/Immunologic: Negative for immunocompromised state.   Neurological:  Positive for light-headedness. Negative for seizures, syncope, facial asymmetry and weakness.   Psychiatric/Behavioral:  Positive for agitation and confusion. Negative for behavioral problems.        Objective:     Vital Signs (Most Recent):  Temp: 98.5 °F (36.9 °C) (01/28/23 0600)  Pulse: 95 (01/28/23 0600)  Resp: 15 (01/28/23 0600)  BP: 134/67 (01/28/23 0600)  SpO2: 99 % (01/28/23 0600) Vital Signs (24h Range):  Temp:  [97.8 °F (36.6 °C)-98.6 °F (37 °C)] 98.5 °F (36.9 °C)  Pulse:  [] 95  Resp:  [15-24] 15  SpO2:  [97 %-100 %] 99 %  BP: (113-162)/(55-86) 134/67     Weight: 55.8 kg (123 lb 0.3 oz)  Body mass index is 18.7 kg/m².    Intake/Output Summary (Last 24 hours) at 1/28/2023 0817  Last data filed at 1/28/2023 0600  Gross per 24 hour   Intake 2428.56 ml   Output --   Net 2428.56 ml      Physical Exam  Vitals and nursing note reviewed.   Constitutional:       General: He is in acute distress.      Appearance: He is well-developed and normal weight. He is ill-appearing, toxic-appearing and diaphoretic.   HENT:      Head: Normocephalic and atraumatic.      Nose: Nose normal. No congestion.      Mouth/Throat:      Mouth: Mucous membranes are dry.      Pharynx: No oropharyngeal exudate.   Eyes:      General: No scleral  icterus.     Pupils: Pupils are equal, round, and reactive to light.   Neck:      Thyroid: No thyromegaly.   Cardiovascular:      Rate and Rhythm: Regular rhythm. Tachycardia present.      Heart sounds:     No friction rub. No gallop.   Pulmonary:      Effort: Pulmonary effort is normal.      Breath sounds: Normal breath sounds. No stridor. No wheezing or rales.   Abdominal:      General: There is no distension.      Palpations: Abdomen is soft.      Tenderness: There is abdominal tenderness. There is guarding.   Musculoskeletal:         General: No swelling or deformity. Normal range of motion.      Cervical back: Normal range of motion. No rigidity.      Right lower leg: No edema.      Left lower leg: No edema.   Lymphadenopathy:      Cervical: No cervical adenopathy.   Skin:     General: Skin is warm.      Capillary Refill: Capillary refill takes less than 2 seconds.      Coloration: Skin is not jaundiced.      Findings: No bruising.   Neurological:      Mental Status: He is alert and oriented to person, place, and time.      Cranial Nerves: No cranial nerve deficit.      Motor: No weakness.   Psychiatric:         Mood and Affect: Mood normal.         Behavior: Behavior normal.         Thought Content: Thought content normal.         Judgment: Judgment normal.           Recent Results (from the past 24 hour(s))   POCT glucose    Collection Time: 01/27/23  7:03 PM   Result Value Ref Range    POCT Glucose >500 (H) 70 - 110 mg/dL   CBC auto differential    Collection Time: 01/27/23  7:07 PM   Result Value Ref Range    WBC 18.74 (H) 3.90 - 12.70 K/uL    RBC 5.71 4.60 - 6.20 M/uL    Hemoglobin 14.9 14.0 - 18.0 g/dL    Hematocrit 47.9 40.0 - 54.0 %    MCV 84 82 - 98 fL    MCH 26.1 (L) 27.0 - 31.0 pg    MCHC 31.1 (L) 32.0 - 36.0 g/dL    RDW 13.2 11.5 - 14.5 %    Platelets 420 150 - 450 K/uL    MPV 9.9 9.2 - 12.9 fL    Immature Granulocytes 0.6 (H) 0.0 - 0.5 %    Gran # (ANC) 15.9 (H) 1.8 - 7.7 K/uL    Immature Grans  (Abs) 0.12 (H) 0.00 - 0.04 K/uL    Lymph # 1.2 1.0 - 4.8 K/uL    Mono # 1.5 (H) 0.3 - 1.0 K/uL    Eos # 0.0 0.0 - 0.5 K/uL    Baso # 0.05 0.00 - 0.20 K/uL    nRBC 0 0 /100 WBC    Gran % 84.8 (H) 38.0 - 73.0 %    Lymph % 6.1 (L) 18.0 - 48.0 %    Mono % 8.0 4.0 - 15.0 %    Eosinophil % 0.2 0.0 - 8.0 %    Basophil % 0.3 0.0 - 1.9 %    Differential Method Automated    Comprehensive metabolic panel    Collection Time: 01/27/23  7:07 PM   Result Value Ref Range    Sodium 127 (L) 136 - 145 mmol/L    Potassium 6.8 (HH) 3.5 - 5.1 mmol/L    Chloride 91 (L) 95 - 110 mmol/L    CO2 8 (LL) 23 - 29 mmol/L    Glucose 737 (HH) 70 - 110 mg/dL    BUN 27 (H) 6 - 20 mg/dL    Creatinine 2.0 (H) 0.5 - 1.4 mg/dL    Calcium 9.7 8.7 - 10.5 mg/dL    Total Protein 9.3 (H) 6.0 - 8.4 g/dL    Albumin 4.2 3.5 - 5.2 g/dL    Total Bilirubin 0.4 0.1 - 1.0 mg/dL    Alkaline Phosphatase 139 (H) 55 - 135 U/L    AST 15 10 - 40 U/L    ALT 14 10 - 44 U/L    Anion Gap 28 (H) 8 - 16 mmol/L    eGFR 38 (A) >60 mL/min/1.73 m^2   Beta - Hydroxybutyrate, Serum    Collection Time: 01/27/23  7:07 PM   Result Value Ref Range    Beta-Hydroxybutyrate 6.6 (H) 0.0 - 0.5 mmol/L   Lipase    Collection Time: 01/27/23  7:09 PM   Result Value Ref Range    Lipase 29 4 - 60 U/L   ISTAT PROCEDURE    Collection Time: 01/27/23  7:24 PM   Result Value Ref Range    POC PH 7.070 (L) 7.35 - 7.45    POC PCO2 39.1 35 - 45 mmHg    POC PO2 26 (L) 40 - 60 mmHg    POC HCO3 11.3 (L) 24 - 28 mmol/L    POC BE -18 -2 to 2 mmol/L    POC SATURATED O2 28 (L) 95 - 100 %    POC TCO2 12 (L) 24 - 29 mmol/L    Sample VENOUS     Site Other     Allens Test N/A    Urinalysis, Reflex to Urine Culture Urine, Clean Catch    Collection Time: 01/27/23  9:27 PM    Specimen: Urine   Result Value Ref Range    Specimen UA Urine, Clean Catch     Color, UA Colorless (A) Yellow, Straw, Monet    Appearance, UA Clear Clear    pH, UA 5.0 5.0 - 8.0    Specific Gravity, UA 1.020 1.005 - 1.030    Protein, UA Trace (A)  Negative    Glucose, UA 4+ (A) Negative    Ketones, UA 3+ (A) Negative    Bilirubin (UA) Negative Negative    Occult Blood UA Negative Negative    Nitrite, UA Negative Negative    Urobilinogen, UA Negative <2.0 EU/dL    Leukocytes, UA Negative Negative   Urinalysis Microscopic    Collection Time: 01/27/23  9:27 PM   Result Value Ref Range    Bacteria Rare None-Occ /hpf    Yeast, UA None None    Microscopic Comment SEE COMMENT    POCT COVID-19 Rapid Screening    Collection Time: 01/27/23 10:17 PM   Result Value Ref Range    POC Rapid COVID Negative Negative     Acceptable Yes    POCT glucose    Collection Time: 01/27/23 10:29 PM   Result Value Ref Range    POCT Glucose >500 (H) 70 - 110 mg/dL   Basic metabolic panel    Collection Time: 01/27/23 11:50 PM   Result Value Ref Range    Sodium 141 136 - 145 mmol/L    Potassium 3.4 (L) 3.5 - 5.1 mmol/L    Chloride 112 (H) 95 - 110 mmol/L    CO2 7 (LL) 23 - 29 mmol/L    Glucose 490 (HH) 70 - 110 mg/dL    BUN 25 (H) 6 - 20 mg/dL    Creatinine 1.6 (H) 0.5 - 1.4 mg/dL    Calcium 7.3 (L) 8.7 - 10.5 mg/dL    Anion Gap 22 (H) 8 - 16 mmol/L    eGFR 50 (A) >60 mL/min/1.73 m^2   Lactic acid, plasma    Collection Time: 01/27/23 11:50 PM   Result Value Ref Range    Lactate (Lactic Acid) 5.3 (HH) 0.5 - 2.2 mmol/L   ISTAT PROCEDURE    Collection Time: 01/28/23  3:38 AM   Result Value Ref Range    POC PH 7.141 (L) 7.35 - 7.45    POC PCO2 54.8 (H) 35 - 45 mmHg    POC PO2 20 (L) 40 - 60 mmHg    POC HCO3 18.7 (L) 24 - 28 mmol/L    POC BE -11 -2 to 2 mmol/L    POC SATURATED O2 19 (L) 95 - 100 %    POC TCO2 20 (L) 24 - 29 mmol/L    Sample VENOUS     Site Other     Allens Test N/A    Basic metabolic panel    Collection Time: 01/28/23  3:39 AM   Result Value Ref Range    Sodium 141 136 - 145 mmol/L    Potassium 4.9 3.5 - 5.1 mmol/L    Chloride 105 95 - 110 mmol/L    CO2 16 (L) 23 - 29 mmol/L    Glucose 272 (H) 70 - 110 mg/dL    BUN 25 (H) 6 - 20 mg/dL    Creatinine 1.8 (H) 0.5 -  1.4 mg/dL    Calcium 9.2 8.7 - 10.5 mg/dL    Anion Gap 20 (H) 8 - 16 mmol/L    eGFR 44 (A) >60 mL/min/1.73 m^2   Phosphorus    Collection Time: 01/28/23  3:39 AM   Result Value Ref Range    Phosphorus 2.9 2.7 - 4.5 mg/dL   Magnesium    Collection Time: 01/28/23  3:39 AM   Result Value Ref Range    Magnesium 2.5 1.6 - 2.6 mg/dL   Basic metabolic panel    Collection Time: 01/28/23  6:59 AM   Result Value Ref Range    Sodium 139 136 - 145 mmol/L    Potassium 5.0 3.5 - 5.1 mmol/L    Chloride 109 95 - 110 mmol/L    CO2 15 (L) 23 - 29 mmol/L    Glucose 162 (H) 70 - 110 mg/dL    BUN 24 (H) 6 - 20 mg/dL    Creatinine 1.6 (H) 0.5 - 1.4 mg/dL    Calcium 9.1 8.7 - 10.5 mg/dL    Anion Gap 15 8 - 16 mmol/L    eGFR 50 (A) >60 mL/min/1.73 m^2   CBC auto differential    Collection Time: 01/28/23  6:59 AM   Result Value Ref Range    WBC 14.21 (H) 3.90 - 12.70 K/uL    RBC 5.29 4.60 - 6.20 M/uL    Hemoglobin 13.8 (L) 14.0 - 18.0 g/dL    Hematocrit 44.1 40.0 - 54.0 %    MCV 83 82 - 98 fL    MCH 26.1 (L) 27.0 - 31.0 pg    MCHC 31.3 (L) 32.0 - 36.0 g/dL    RDW 13.2 11.5 - 14.5 %    Platelets SEE COMMENT 150 - 450 K/uL    MPV SEE COMMENT 9.2 - 12.9 fL    Immature Granulocytes 0.7 (H) 0.0 - 0.5 %    Gran # (ANC) 10.7 (H) 1.8 - 7.7 K/uL    Immature Grans (Abs) 0.10 (H) 0.00 - 0.04 K/uL    Lymph # 1.4 1.0 - 4.8 K/uL    Mono # 1.9 (H) 0.3 - 1.0 K/uL    Eos # 0.1 0.0 - 0.5 K/uL    Baso # 0.04 0.00 - 0.20 K/uL    nRBC 0 0 /100 WBC    Gran % 75.3 (H) 38.0 - 73.0 %    Lymph % 9.6 (L) 18.0 - 48.0 %    Mono % 13.5 4.0 - 15.0 %    Eosinophil % 0.6 0.0 - 8.0 %    Basophil % 0.3 0.0 - 1.9 %    Platelet Estimate Clumped (A)     Differential Method Automated    Lactic acid, plasma    Collection Time: 01/28/23  6:59 AM   Result Value Ref Range    Lactate (Lactic Acid) 6.1 (HH) 0.5 - 2.2 mmol/L       Microbiology Results (last 7 days)       Procedure Component Value Units Date/Time    Blood culture [[id number]] Collected: 01/28/23 0659    Order Status:  Sent Specimen: Blood Updated: 23 0711    Narrative:      Collection has been rescheduled by [email or web address] at 2023 03:34 Reason:   Patient unavailable.nurse will call the lab when ready.  Collection has been rescheduled by BRUNA at 2023 03:42 Reason: Pt   is extremley combative. RN requested lab try again later  Collection has been rescheduled by LB3 at 2023 03:34 Reason:   Patient unavailable.nurse will call the lab when ready.  Collection has been rescheduled by BRUNA at 2023 03:42 Reason: Pt   is keiley combative. RN requested lab try again later             Imaging Results              X-Ray Chest AP Portable (Final result)  Result time 23 19:58:26      Final result by Linh Gordon MD (23 19:58:26)                   Impression:      No acute cardiopulmonary process identified.      Electronically signed by: Linh Gordon MD  Date:    2023  Time:    19:58               Narrative:    EXAMINATION:  XR CHEST AP PORTABLE    CLINICAL HISTORY:  hyperglycemia;    TECHNIQUE:  Single frontal view of the chest was performed.    COMPARISON:  2022.    FINDINGS:  Cardiac silhouette is normal in size.  Lungs are symmetrically expanded.  No evidence of focal consolidative process, pneumothorax, or significant pleural effusion.  There is chronic blunting of the right costophrenic angle.  No acute osseous abnormality identified.                                            Assessment/Plan:      * Diabetic ketoacidosis without coma associated with type 1 diabetes mellitus  - Patient with a history of diabetes   Lab Results   Component Value Date    HGBA1C 10.8 (H) 2022     - Labs were suggestive of DKA (PH: 7.0, HCO3: 12, B, K: 6.8, A)  - Treated with IVF, Insulin drip, electrolyte mangament in ED.   - Likely secondary to medication non compliance     Plan:  Insulin infusion initiated  IF Glucose is >250, run 0.9% NS at 125 mL/hr along with insulin infusion.  IF  Glucose is < 250, run D5+0.45% NS at 125 mL/hr along with insulin infusion.  Q.4 BMP, Ph, Mg checks  Q 1 hour glucose checks    Chronic hepatitis C  Patient with active hepatitis-C as recently as a year ago, no apparent hepatic dysfunction at this time based on LFTs  As of 2020 he is not cleared his hepatitis naturally, will recheck in our Na while he is here   Will need referral at discharge to ID or hepatology      Aggression  Aggressive with nursing staff  Will give depakote to potentially remove soft restraints      Leukocytosis  Likely reactive   Monitor     RAMYA (acute kidney injury)  Patient with acute kidney injury likely due to pre-renal azotemia RAMYA is currently stable. Labs reviewed- Renal function/electrolytes with Estimated Creatinine Clearance: 40.7 mL/min (A) (based on SCr of 1.6 mg/dL (H)). according to latest data. Monitor urine output and serial BMP and adjust therapy as needed. Avoid nephrotoxins and renally dose meds for GFR listed above.   -Continue fluids    CAD (coronary artery disease)  Resume ASA, statin       COPD (chronic obstructive pulmonary disease)  Not in exacerbation   PRN Duo-Nebs     Hyponatremia  In the setting of hyperglycemia. Monitor       Hyperkalemia  Treat medically   Monitor BMPs      Polysubstance use disorder  Need to be counseled on cessation once less lethargic       Hyperlipidemia  Resume statin       Essential hypertension  Monitor BP        VTE Risk Mitigation (From admission, onward)           Ordered     enoxaparin injection 40 mg  Daily         01/28/23 1341     IP VTE LOW RISK PATIENT  Once         01/27/23 1948     Place sequential compression device  Until discontinued         01/27/23 1948                    Discharge Planning   ROLA:      Code Status: Full Code   Is the patient medically ready for discharge?:     Reason for patient still in hospital (select all that apply): Patient trending condition and Treatment               Critical care time spent on the  evaluation and treatment of severe organ dysfunction, review of pertinent labs and imaging studies, discussions with consulting providers and discussions with patient/family: 35 minutes.      Jose Mitchell MD  Department of Hospital Medicine   [facility] - [place] - Intensive Care

## 2023-01-28 NOTE — SUBJECTIVE & OBJECTIVE
Past Medical History:   Diagnosis Date    Acute on chronic pancreatitis 04/03/2016    Addiction to drug     CHF (congestive heart failure)     DKA (diabetic ketoacidoses) 02/2020    DM (diabetes mellitus), type 1     HCV (hepatitis C virus)     high VL     HTN (hypertension)     Hx of psychiatric care     Hypomagnesemia 05/25/2020    IVDU (intravenous drug user)     Heroin    Pancreatitis     Psychiatric problem     Substance abuse        Past Surgical History:   Procedure Laterality Date    CARDIAC SURGERY  1999    stent placed. ([facility])    ESOPHAGOGASTRODUODENOSCOPY N/A 3/5/2020    Gastritis.  No H pylori.  Completed PPI for 1 month.  Procedure: EGD (ESOPHAGOGASTRODUODENOSCOPY);  Surgeon: Brinda Rene MD;  Location: [facility] ENDO;  Service: Endoscopy;  Laterality: N/A;  [id number] A; [phone]    LEFT HEART CATHETERIZATION Left 9/28/2020    Procedure: Left heart cath;  Surgeon: Fito Rangel MD;  Location: [facility] CATH LAB;  Service: Cardiology;  Laterality: Left;    SHOULDER SURGERY  2013    right shoulder, spur removal.       Review of patient's allergies indicates:  No Known Allergies    No current facility-administered medications on file prior to encounter.     Current Outpatient Medications on File Prior to Encounter   Medication Sig    aspirin 81 MG Chew Take 1 tablet (81 mg total) by mouth once daily.    atorvastatin (LIPITOR) 20 MG tablet Take 1 tablet (20 mg total) by mouth once daily. (Patient not taking: Reported on 10/18/2022)    blood sugar diagnostic Strp Use To check blood glucose three times daily,    cetirizine (ZYRTEC) 10 MG tablet Take 1 tablet (10 mg total) by mouth once daily.    dextromethorphan 15 mg/5 mL syrup Take 5 mLs (15 mg total) by mouth every 4 to 6 hours as needed for Cough.    gabapentin (NEURONTIN) 100 MG capsule Take 1 capsule (100 mg total) by mouth 3 (three) times daily.    insulin detemir U-100 (LEVEMIR FLEXTOUCH) 100 unit/mL (3 mL) SubQ InPn pen Inject 40 Units into the  "skin every evening.    insulin lispro 100 unit/mL pen Inject 7 Units into the skin 3 (three) times daily with meals.    insulin syringe-needle U-100 0.5 mL 31 gauge x 5/16" Syrg 1 each by Misc.(Non-Drug; Combo Route) route 3 (three) times daily.    lancets Misc To check BG 3 times daily, to use with insurance preferred meter    naloxone (NARCAN) 4 mg/actuation Spry 4mg by nasal route as needed for opioid overdose; may repeat every 2-3 minutes in alternating nostrils until medical help arrives. Call 911 (Patient not taking: Reported on 10/18/2022)    nitroGLYCERIN (NITROSTAT) 0.4 MG SL tablet Place 1 tablet (0.4 mg total) under the tongue every 5 (five) minutes as needed for Chest pain. (Patient not taking: Reported on 2022)    pen needle, diabetic (CLICKFINE PEN NEEDLE) 32 gauge x 5/32" Ndle 1 each by Misc.(Non-Drug; Combo Route) route 4 (four) times daily before meals and nightly.    [DISCONTINUED] insulin aspart U-100 (NOVOLOG) 100 unit/mL injection Inject 1-10 Units into the skin 3 (three) times daily with meals.     Family History       Problem Relation (Age of Onset)    Asthma Mother          Tobacco Use    Smoking status: Every Day     Packs/day: 1.00     Years: 20.00     Pack years: 20.00     Types: Cigarettes     Start date: 4/3/1981     Last attempt to quit: 2020     Years since quittin.6    Smokeless tobacco: Never    Tobacco comments:     states approx 1 per day.   Substance and Sexual Activity    Alcohol use: Not Currently     Alcohol/week: 0.0 standard drinks    Drug use: Yes     Types: Heroin, IV, Marijuana, Amphetamines    Sexual activity: Yes     Partners: Female     Review of Systems   Constitutional:  Positive for fatigue.   HENT: Negative.     Eyes: Negative.    Respiratory: Negative.     Cardiovascular: Negative.    Gastrointestinal:  Positive for abdominal pain, nausea and vomiting.   Endocrine: Negative.    Genitourinary: Negative.    Musculoskeletal: Negative.    Skin: Negative.  "   Allergic/Immunologic: Negative.    Neurological:  Positive for weakness.   Psychiatric/Behavioral: Negative.     Objective:     Vital Signs (Most Recent):  Temp: 97.8 °F (36.6 °C) (01/27/23 1847)  Pulse: (!) 124 (01/27/23 2036)  Resp: (!) 24 (01/27/23 2036)  BP: 132/61 (01/27/23 1917)  SpO2: 100 % (01/27/23 1917)   Vital Signs (24h Range):  Temp:  [97.8 °F (36.6 °C)] 97.8 °F (36.6 °C)  Pulse:  [115-124] 124  Resp:  [20-24] 24  SpO2:  [98 %-100 %] 100 %  BP: (132-162)/(61-82) 132/61     Weight: 59 kg (130 lb)  Body mass index is 19.77 kg/m².    Physical Exam  Vitals and nursing note reviewed.   Constitutional:       General: He is in acute distress.      Appearance: He is ill-appearing.   HENT:      Head: Normocephalic and atraumatic.      Nose: Nose normal.      Mouth/Throat:      Mouth: Mucous membranes are dry.   Eyes:      Extraocular Movements: Extraocular movements intact.   Cardiovascular:      Rate and Rhythm: Normal rate.      Pulses: Normal pulses.      Heart sounds: No murmur heard.  Pulmonary:      Effort: Respiratory distress present.   Abdominal:      General: Abdomen is flat.      Palpations: Abdomen is soft.      Tenderness: There is abdominal tenderness.   Musculoskeletal:      Right lower leg: No edema.      Left lower leg: No edema.   Skin:     General: Skin is warm.      Capillary Refill: Capillary refill takes less than 2 seconds.   Neurological:      Mental Status: He is alert.      Comments: Lethargic but responsive to questions            Significant Labs: All pertinent labs within the past 24 hours have been reviewed.    Significant Imaging: I have reviewed all pertinent imaging results/findings within the past 24 hours.

## 2023-01-28 NOTE — PLAN OF CARE
Pt awake and alert. Oriented to self and place only. Resp even and unlabored on ra with no distress noted. Ns at 125cc/hr infusing to #20 right fa with no redness edema or pain noted. Insulin infusing at 5units/hr to #20 left fa with no redness edema or pain noted. Potassium 60meq ivpb in progress for potassium of 3.4. pt was combative swinging at staff and pulling at lines. Bilat upper soft restraints placed per md order. Bed low position with sr up and call light in reach.

## 2023-01-28 NOTE — EICU
57 year old male admitted with DKA.    PMH/O DM type 1,COPD,CAD,HTN,HLD,substance use,mood disorder.    On camera assessment patient appears to be sleeping  ,/92,RR23,SPO2 100%    Data  SODIUM 127  Potassium 6.8  Chloride 91  Bicarbonate 8  Glucose 737  Anion gap 28  Creatinine 2  Beta hydroxybutyrate 6.6  ABG 7.070/39  HBA1C 10.8 4 months ago.  EKG sinus tachycardia,117/minute  WBC 18.74    Assessment and plan:  1.DKA-insulin,fluid hydration,correct electrolytes as needed,may need sodium bicarbonate if PH less than 7.  2.COPD-bronchodilators as needed,smoking cessation counseling.  3.HTN-keep BP strictly controlled.  4.HLD-anti lipids as needed.  5.Substance use disorder-counseling.  6.mood disorder-psychiatrist as needed.  7.CAD-aspirin.    DVT prophylaxis-ambulated.

## 2023-01-28 NOTE — ED PROVIDER NOTES
"Encounter Date: 1/27/2023       History     Chief Complaint   Patient presents with    Hyperglycemia     EMS called to 56yo male that is non-complaint with his meds and cbg is reading high. Has N/V and abdominal pain, polydipsia, and polyuria.      57-year-old male with history of , insulin-dependent diabetes, heroin use, chronic pancreatitis, underlying psychiatric disease presents for feeling "out of his mind" and not taking his insulin.  Patient reports he is not sure how long it has been since he has taken his insulin but reports days of intermittent, nonbloody and nonbilious nausea and vomiting along with nonradiating midepigastric abdominal pain, polyuria and polydipsia.  He denies fevers or chills.  History somewhat limited by the patient's perseveration on" getting another cup of ice".    Review of patient's allergies indicates:  No Known Allergies  Past Medical History:   Diagnosis Date    Acute on chronic pancreatitis 04/03/2016    Addiction to drug     CHF (congestive heart failure)     DKA (diabetic ketoacidoses) 02/2020    DM (diabetes mellitus), type 1     HCV (hepatitis C virus)     high VL     HTN (hypertension)     Hx of psychiatric care     Hypomagnesemia 05/25/2020    IVDU (intravenous drug user)     Heroin    Pancreatitis     Psychiatric problem     Substance abuse      Past Surgical History:   Procedure Laterality Date    CARDIAC SURGERY  1999    stent placed. ([facility])    ESOPHAGOGASTRODUODENOSCOPY N/A 3/5/2020    Gastritis.  No H pylori.  Completed PPI for 1 month.  Procedure: EGD (ESOPHAGOGASTRODUODENOSCOPY);  Surgeon: Brinda Rene MD;  Location: [facility] ENDO;  Service: Endoscopy;  Laterality: N/A;  W421 A; [id number]    LEFT HEART CATHETERIZATION Left 9/28/2020    Procedure: Left heart cath;  Surgeon: Fito Rangel MD;  Location: [facility] CATH LAB;  Service: Cardiology;  Laterality: Left;    SHOULDER SURGERY  2013    right shoulder, spur removal.     Family History   Problem Relation " Age of Onset    Asthma Mother      Social History     Tobacco Use    Smoking status: Every Day     Packs/day: 1.00     Years: 20.00     Pack years: 20.00     Types: Cigarettes     Start date: 4/3/1981     Last attempt to quit: 2020     Years since quittin.6    Smokeless tobacco: Never    Tobacco comments:     states approx 1 per day.   Substance Use Topics    Alcohol use: Not Currently     Alcohol/week: 0.0 standard drinks    Drug use: Yes     Types: Heroin, IV, Marijuana, Amphetamines     Review of Systems   Constitutional:  Negative for activity change and appetite change.   HENT:  Negative for congestion and drooling.    Eyes:  Negative for discharge and itching.   Respiratory:  Negative for cough and chest tightness.    Cardiovascular:  Negative for chest pain and leg swelling.   Gastrointestinal:  Positive for abdominal pain, nausea and vomiting. Negative for abdominal distention.   Endocrine: Positive for polydipsia and polyuria.   Genitourinary:  Negative for difficulty urinating and dysuria.   Musculoskeletal:  Negative for arthralgias.   Skin:  Negative for color change and pallor.   Neurological:  Negative for dizziness and facial asymmetry.   Psychiatric/Behavioral:  Negative for agitation and behavioral problems.      Physical Exam     Initial Vitals [23 1847]   BP Pulse Resp Temp SpO2   (!) 162/82 (!) 116 20 97.8 °F (36.6 °C) 98 %      MAP       --         Physical Exam    Nursing note and vitals reviewed.  Constitutional: He appears well-developed and well-nourished.   HENT:   Head: Normocephalic and atraumatic.   Mouth/Throat: Oropharynx is clear and moist.   Eyes: Conjunctivae and EOM are normal. Pupils are equal, round, and reactive to light.   Neck: No thyromegaly present.   Normal range of motion.  Cardiovascular:  Regular rhythm and intact distal pulses.           Tachycardic   Pulmonary/Chest: Breath sounds normal. No respiratory distress. He has no wheezes.   Abdominal: Abdomen is  soft. Bowel sounds are normal. He exhibits distension. There is no abdominal tenderness.   Midepigastric tenderness to palpation without rebound or guarding   Musculoskeletal:         General: No tenderness or edema. Normal range of motion.      Cervical back: Normal range of motion.     Neurological: He is alert and oriented to person, place, and time. He has normal strength. No cranial nerve deficit.   Skin: Skin is warm and dry. No rash noted.   Psychiatric: He has a normal mood and affect. His behavior is normal. Thought content normal.       ED Course   Critical Care    Date/Time: 1/27/2023 8:09 PM  Performed by: Ambrocio Ruiz MD  Authorized by: Jose Mitchell MD   Direct patient critical care time: 10 minutes  Additional history critical care time: 5 minutes  Ordering / reviewing critical care time: 5 minutes  Documentation critical care time: 5 minutes  Consulting other physicians critical care time: 5 minutes  Total critical care time (exclusive of procedural time) : 30 minutes  Critical care was necessary to treat or prevent imminent or life-threatening deterioration of the following conditions: endocrine crisis and metabolic crisis.  Critical care was time spent personally by me on the following activities: discussions with consultants, evaluation of patient's response to treatment, ordering and performing treatments and interventions, ordering and review of laboratory studies, ordering and review of radiographic studies, pulse oximetry and re-evaluation of patient's condition.      Labs Reviewed   CBC W/ AUTO DIFFERENTIAL - Abnormal; Notable for the following components:       Result Value    WBC 18.74 (*)     MCH 26.1 (*)     MCHC 31.1 (*)     Immature Granulocytes 0.6 (*)     Gran # (ANC) 15.9 (*)     Immature Grans (Abs) 0.12 (*)     Mono # 1.5 (*)     Gran % 84.8 (*)     Lymph % 6.1 (*)     All other components within normal limits   COMPREHENSIVE METABOLIC PANEL - Abnormal; Notable for the  following components:    Sodium 127 (*)     Potassium 6.8 (*)     Chloride 91 (*)     CO2 8 (*)     Glucose 737 (*)     BUN 27 (*)     Creatinine 2.0 (*)     Total Protein 9.3 (*)     Alkaline Phosphatase 139 (*)     Anion Gap 28 (*)     eGFR 38 (*)     All other components within normal limits    Narrative:     K and CO2. Critical results called and verbal readback obtained from   Dr. Ruiz @ 2558 on 27Jan2023. BML by BL1 01/27/2023 19:49   BETA - HYDROXYBUTYRATE, SERUM - Abnormal; Notable for the following components:    Beta-Hydroxybutyrate 6.6 (*)     All other components within normal limits   URINALYSIS, REFLEX TO URINE CULTURE - Abnormal; Notable for the following components:    Color, UA Colorless (*)     Protein, UA Trace (*)     Glucose, UA 4+ (*)     Ketones, UA 3+ (*)     All other components within normal limits    Narrative:     Specimen Source->Urine   POCT GLUCOSE - Abnormal; Notable for the following components:    POCT Glucose >500 (*)     All other components within normal limits   ISTAT PROCEDURE - Abnormal; Notable for the following components:    POC PH 7.070 (*)     POC PO2 26 (*)     POC HCO3 11.3 (*)     POC SATURATED O2 28 (*)     POC TCO2 12 (*)     All other components within normal limits   POCT GLUCOSE - Abnormal; Notable for the following components:    POCT Glucose >500 (*)     All other components within normal limits   LIPASE   URINALYSIS MICROSCOPIC    Narrative:     Specimen Source->Urine   SARS-COV-2 RDRP GENE   POCT GLUCOSE MONITORING CONTINUOUS     EKG Readings: (Independently Interpreted)   Initial Reading: No STEMI.   EKG with regular rate, sinus tachycardia, normal axis, no acute ST elevations or depressions, normal MN, QRS and QT interval. Interpreted by me.       Imaging Results              X-Ray Chest AP Portable (Final result)  Result time 01/27/23 19:58:26      Final result by Linh Gordon MD (01/27/23 19:58:26)                   Impression:      No acute  cardiopulmonary process identified.      Electronically signed by: Linh Gordon MD  Date:    01/27/2023  Time:    19:58               Narrative:    EXAMINATION:  XR CHEST AP PORTABLE    CLINICAL HISTORY:  hyperglycemia;    TECHNIQUE:  Single frontal view of the chest was performed.    COMPARISON:  September 2022.    FINDINGS:  Cardiac silhouette is normal in size.  Lungs are symmetrically expanded.  No evidence of focal consolidative process, pneumothorax, or significant pleural effusion.  There is chronic blunting of the right costophrenic angle.  No acute osseous abnormality identified.                                       Medications   sodium chloride 0.9% flush 10 mL (has no administration in time range)   dextrose 50% injection 25 g (has no administration in time range)   dextrose 10 % infusion (has no administration in time range)   dextrose 50% injection 12.5 g (has no administration in time range)   dextrose 10 % infusion (has no administration in time range)   insulin regular in 0.9 % NaCl 100 unit/100 mL (1 unit/mL) infusion (8 Units/hr Intravenous Rate/Dose Change 1/28/23 0249)   aspirin chewable tablet 81 mg (has no administration in time range)   atorvastatin tablet 20 mg (has no administration in time range)   sodium chloride 0.9% flush 10 mL (has no administration in time range)   0.9%  NaCl infusion ( Intravenous Verify Only 1/28/23 0300)   dextrose 5 % and 0.45 % NaCl infusion (has no administration in time range)   dextrose 50% injection 25 g (has no administration in time range)   dextrose 50% injection 12.5 g (has no administration in time range)   ondansetron injection 4 mg (has no administration in time range)   acetaminophen tablet 650 mg (has no administration in time range)   potassium chloride 10 mEq in 100 mL IVPB (has no administration in time range)     And   potassium chloride 10 mEq in 100 mL IVPB ( Intravenous Verify Only 1/28/23 0300)     And   potassium chloride 10 mEq in 100 mL  IVPB (has no administration in time range)   melatonin tablet 6 mg (has no administration in time range)   famotidine (PF) injection 20 mg (has no administration in time range)   prochlorperazine injection Soln 5 mg (has no administration in time range)   bisacodyL suppository 10 mg (has no administration in time range)   calcium gluconate 1 g in NS IVPB (premixed) (0 g Intravenous Stopped 1/27/23 2319)     And   calcium gluconate 1 g in NS IVPB (premixed) (0 g Intravenous Stopped 1/27/23 2329)   sodium chloride 0.9% bolus 1,000 mL 1,000 mL (1,000 mLs Intravenous New Bag 1/27/23 1905)   ondansetron injection 4 mg (4 mg Intravenous Given 1/27/23 1905)   droperidoL injection 1.25 mg (1.25 mg Intravenous Given 1/27/23 1942)   sodium chloride 0.9% bolus 250 mL 250 mL (0 mLs Intravenous Stopped 1/27/23 2259)   magnesium sulfate 2g in water 50mL IVPB (premix) (0 g Intravenous Stopped 1/27/23 2300)   insulin regular injection 6 Units 0.06 mL (6 Units Intravenous Given 1/27/23 2054)   albuterol nebulizer solution 10 mg (10 mg Nebulization Given 1/27/23 2036)   lactated ringers bolus 1,000 mL (0 mLs Intravenous Stopped 1/28/23 0241)                 ED Course as of 01/28/23 0338 Fri Jan 27, 2023   2007 Comprehensive metabolic panel(!!) [BS]   2008 ISTAT PROCEDURE(!) [BS]   2008 Beta - Hydroxybutyrate, Serum(!) [BS]   2008 CBC auto differential(!) [BS]      ED Course User Index  [BS] Ambrocio Ruiz MD               57-year-old male with history of insulin dependent diabetes with recurrent medication noncompliance presents for nausea vomiting and dehydration concerning for DKA.  Fingerstick glucose greater than 500.  Vitals notable for tachycardia otherwise within normal limits.  Patient's abdomen is soft and mildly tender to palpation in the midepigastrium, doubt acute intra-abdominal process or infection.  Labs consistent with DKA.  Potassium of 6.8, pseudo hyponatremia of 127.  EKG without evidence of peaked T-waves,  no peaked P waves, no wide QRS, no evidence of symptomatic hyperkalemia.  Will place patient on insulin drip, given insulin bolus for hyperkalemia, treated previously with 2 L IV fluids.  Lipase within normal limits, doubt pancreatitis.  Patient also has prerenal RAMYA with creatinine of 2 likely due to dehydration, nausea and vomiting.  Elevated white count likely due to nausea and vomiting given soft and nontender abdomen.  Doubt acute intra-abdominal process at this time.Patient to be admitted to ICU for DKA.      Clinical Impression:   Final diagnoses:  [R73.9] Hyperglycemia  [E10.10] Diabetic ketoacidosis without coma associated with type 1 diabetes mellitus (Primary)  [E87.5] Hyperkalemia  [N17.9] RAMYA (acute kidney injury)  [E86.0] Dehydration        ED Disposition Condition    Admit Stable                Ambrocio Ruiz MD  01/28/23 0060

## 2023-01-28 NOTE — HPI
This is a 57 year old male with a PMHx of T1DM, COPD (not on O2), CAD, HTN, HLD, substance use (including IVDU), mood disorder who presented with hyperglycemia.     Patient reports feeling unwell for the last 3 days. Reportedly he last took his insulin around that time. He is lethargic during the interview but responsive. He reports having nausea, vomiting and diarrhea. He reports recent drug use but did not specify when/what the last time he used. In the ED, he was tachycardic, tachypnic. Labs were suggestive of DKA (PH: 7.0, HCO3: 12, B, K: 6.8, A), and showed leukocytosis (18.7), elevated creatinine (2.0 - baseline of 1.0), hyponatremia (127). CXR showed no acute process. He was given fluids and wa started on an insulin ggt. The patient was admitted to the ICU for further management.

## 2023-01-28 NOTE — ASSESSMENT & PLAN NOTE
Patient with acute kidney injury likely due to pre-renal azotemia RAMYA is currently stable. Labs reviewed- Renal function/electrolytes with Estimated Creatinine Clearance: 34.4 mL/min (A) (based on SCr of 2 mg/dL (H)). according to latest data. Monitor urine output and serial BMP and adjust therapy as needed. Avoid nephrotoxins and renally dose meds for GFR listed above.

## 2023-01-28 NOTE — H&P
[facility]  [facility]  History & Physical    Patient Name: Cali Mabry  MRN: [id number]  Patient Class: IP- Inpatient  Admission Date: 2023  Attending Physician: Jose Mitchell MD   Primary Care Provider: Primary Doctor No         Patient information was obtained from patient and ER records.     Subjective:     Principal Problem:Diabetic ketoacidosis without coma associated with type 1 diabetes mellitus    Chief Complaint:   Chief Complaint   Patient presents with    Hyperglycemia     EMS called to 56yo male that is non-complaint with his meds and cbg is reading high. Has N/V and abdominal pain, polydipsia, and polyuria.         HPI: This is a 57 year old male with a PMHx of T1DM, COPD (not on O2), CAD, HTN, HLD, substance use (including IVDU), mood disorder who presented with hyperglycemia.     Patient reports feeling unwell for the last 3 days. Reportedly he last took his insulin around that time. He is lethargic during the interview but responsive. He reports having nausea, vomiting and diarrhea. He reports recent drug use but did not specify when/what the last time he used. In the ED, he was tachycardic, tachypnic. Labs were suggestive of DKA (PH: 7.0, HCO3: 12, B, K: 6.8, A), and showed leukocytosis (18.7), elevated creatinine (2.0 - baseline of 1.0), hyponatremia (127). CXR showed no acute process. He was given fluids and wa started on an insulin ggt. The patient was admitted to the ICU for further management.         Past Medical History:   Diagnosis Date    Acute on chronic pancreatitis 2016    Addiction to drug     CHF (congestive heart failure)     DKA (diabetic ketoacidoses) 2020    DM (diabetes mellitus), type 1     HCV (hepatitis C virus)     high VL     HTN (hypertension)     Hx of psychiatric care     Hypomagnesemia 2020    IVDU (intravenous drug user)     Heroin    Pancreatitis     Psychiatric problem     Substance abuse   "      Past Surgical History:   Procedure Laterality Date    CARDIAC SURGERY  1999    stent placed. ([facility])    ESOPHAGOGASTRODUODENOSCOPY N/A 3/5/2020    Gastritis.  No H pylori.  Completed PPI for 1 month.  Procedure: EGD (ESOPHAGOGASTRODUODENOSCOPY);  Surgeon: Brinda Rene MD;  Location: [facility] ENDO;  Service: Endoscopy;  Laterality: N/A;  W421 A; x5445    LEFT HEART CATHETERIZATION Left 9/28/2020    Procedure: Left heart cath;  Surgeon: Fito Rangel MD;  Location: [facility] CATH LAB;  Service: Cardiology;  Laterality: Left;    SHOULDER SURGERY  2013    right shoulder, spur removal.       Review of patient's allergies indicates:  No Known Allergies    No current facility-administered medications on file prior to encounter.     Current Outpatient Medications on File Prior to Encounter   Medication Sig    aspirin 81 MG Chew Take 1 tablet (81 mg total) by mouth once daily.    atorvastatin (LIPITOR) 20 MG tablet Take 1 tablet (20 mg total) by mouth once daily. (Patient not taking: Reported on 10/18/2022)    blood sugar diagnostic Strp Use To check blood glucose three times daily,    cetirizine (ZYRTEC) 10 MG tablet Take 1 tablet (10 mg total) by mouth once daily.    dextromethorphan 15 mg/5 mL syrup Take 5 mLs (15 mg total) by mouth every 4 to 6 hours as needed for Cough.    gabapentin (NEURONTIN) 100 MG capsule Take 1 capsule (100 mg total) by mouth 3 (three) times daily.    insulin detemir U-100 (LEVEMIR FLEXTOUCH) 100 unit/mL (3 mL) SubQ InPn pen Inject 40 Units into the skin every evening.    insulin lispro 100 unit/mL pen Inject 7 Units into the skin 3 (three) times daily with meals.    insulin syringe-needle U-100 0.5 mL 31 gauge x 5/16" Syrg 1 each by Misc.(Non-Drug; Combo Route) route 3 (three) times daily.    lancets Misc To check BG 3 times daily, to use with insurance preferred meter    naloxone (NARCAN) 4 mg/actuation Spry 4mg by nasal route as needed for opioid overdose; may " "repeat every 2-3 minutes in alternating nostrils until medical help arrives. Call 911 (Patient not taking: Reported on 10/18/2022)    nitroGLYCERIN (NITROSTAT) 0.4 MG SL tablet Place 1 tablet (0.4 mg total) under the tongue every 5 (five) minutes as needed for Chest pain. (Patient not taking: Reported on 2022)    pen needle, diabetic (CLICKFINE PEN NEEDLE) 32 gauge x 5/32" Ndle 1 each by Misc.(Non-Drug; Combo Route) route 4 (four) times daily before meals and nightly.    [DISCONTINUED] insulin aspart U-100 (NOVOLOG) 100 unit/mL injection Inject 1-10 Units into the skin 3 (three) times daily with meals.     Family History       Problem Relation (Age of Onset)    Asthma Mother          Tobacco Use    Smoking status: Every Day     Packs/day: 1.00     Years: 20.00     Pack years: 20.00     Types: Cigarettes     Start date: 4/3/1981     Last attempt to quit: 2020     Years since quittin.6    Smokeless tobacco: Never    Tobacco comments:     states approx 1 per day.   Substance and Sexual Activity    Alcohol use: Not Currently     Alcohol/week: 0.0 standard drinks    Drug use: Yes     Types: Heroin, IV, Marijuana, Amphetamines    Sexual activity: Yes     Partners: Female     Review of Systems   Constitutional:  Positive for fatigue.   HENT: Negative.     Eyes: Negative.    Respiratory: Negative.     Cardiovascular: Negative.    Gastrointestinal:  Positive for abdominal pain, nausea and vomiting.   Endocrine: Negative.    Genitourinary: Negative.    Musculoskeletal: Negative.    Skin: Negative.    Allergic/Immunologic: Negative.    Neurological:  Positive for weakness.   Psychiatric/Behavioral: Negative.     Objective:     Vital Signs (Most Recent):  Temp: 97.8 °F (36.6 °C) (23)  Pulse: (!) 124 (23)  Resp: (!) 24 (23)  BP: 132/61 (23)  SpO2: 100 % (23)   Vital Signs (24h Range):  Temp:  [97.8 °F (36.6 °C)] 97.8 °F (36.6 °C)  Pulse:  [115-124] " 124  Resp:  [20-24] 24  SpO2:  [98 %-100 %] 100 %  BP: (132-162)/(61-82) 132/61     Weight: 59 kg (130 lb)  Body mass index is 19.77 kg/m².    Physical Exam  Vitals and nursing note reviewed.   Constitutional:       General: He is in acute distress.      Appearance: He is ill-appearing.   HENT:      Head: Normocephalic and atraumatic.      Nose: Nose normal.      Mouth/Throat:      Mouth: Mucous membranes are dry.   Eyes:      Extraocular Movements: Extraocular movements intact.   Cardiovascular:      Rate and Rhythm: Normal rate.      Pulses: Normal pulses.      Heart sounds: No murmur heard.  Pulmonary:      Effort: Respiratory distress present.   Abdominal:      General: Abdomen is flat.      Palpations: Abdomen is soft.      Tenderness: There is abdominal tenderness.   Musculoskeletal:      Right lower leg: No edema.      Left lower leg: No edema.   Skin:     General: Skin is warm.      Capillary Refill: Capillary refill takes less than 2 seconds.   Neurological:      Mental Status: He is alert.      Comments: Lethargic but responsive to questions            Significant Labs: All pertinent labs within the past 24 hours have been reviewed.    Significant Imaging: I have reviewed all pertinent imaging results/findings within the past 24 hours.    Assessment/Plan:     * Diabetic ketoacidosis without coma associated with type 1 diabetes mellitus  - Patient with a history of diabetes   Lab Results   Component Value Date    HGBA1C 10.8 (H) 2022     - Labs were suggestive of DKA (PH: 7.0, HCO3: 12, B, K: 6.8, A)  - Treated with IVF, Insulin drip, electrolyte mangament in ED.   - Likely secondary to medication non compliance     Plan:  - Initiate and maintain DKA protocol   - IVF    RAMYA (acute kidney injury)  Patient with acute kidney injury likely due to pre-renal azotemia RAMYA is currently stable. Labs reviewed- Renal function/electrolytes with Estimated Creatinine Clearance: 34.4 mL/min (A) (based on  SCr of 2 mg/dL (H)). according to latest data. Monitor urine output and serial BMP and adjust therapy as needed. Avoid nephrotoxins and renally dose meds for GFR listed above.       CAD (coronary artery disease)  Resume ASA, statin       COPD (chronic obstructive pulmonary disease)  Not in exacerbation   PRN Duo-Nebs     Leukocytosis  Likely reactive   Monitor     Hyponatremia  In the setting of hyperglycemia. Monitor       Hyperkalemia  Treat medically   Monitor BMPs      Polysubstance use disorder  Need to be counseled on cessation once less lethargic       Hyperlipidemia  Resume statin       Essential hypertension  Monitor BP        VTE Risk Mitigation (From admission, onward)         Ordered     IP VTE LOW RISK PATIENT  Once         01/27/23 1948     Place sequential compression device  Until discontinued         01/27/23 1948                   Josep Simon MD  Department of Hospital Medicine   [facility] - Emergency Dept

## 2023-01-28 NOTE — ASSESSMENT & PLAN NOTE
Patient with acute kidney injury likely due to pre-renal azotemia RAMYA is currently stable. Labs reviewed- Renal function/electrolytes with Estimated Creatinine Clearance: 40.7 mL/min (A) (based on SCr of 1.6 mg/dL (H)). according to latest data. Monitor urine output and serial BMP and adjust therapy as needed. Avoid nephrotoxins and renally dose meds for GFR listed above.   -Continue fluids

## 2023-01-28 NOTE — SUBJECTIVE & OBJECTIVE
Interval History:   NAEON other than aggression.   Per report, he is often aggressive.  Polite to MD, but not to RN's.  Explained DKA and need to keep up with insulin.     Review of Systems   Constitutional:  Positive for activity change, appetite change and fatigue. Negative for fever and unexpected weight change.   HENT:  Negative for trouble swallowing and voice change.    Eyes:  Negative for photophobia and visual disturbance.   Respiratory:  Negative for cough and shortness of breath.    Cardiovascular:  Negative for chest pain, palpitations and leg swelling.   Gastrointestinal:  Positive for abdominal pain, nausea and vomiting. Negative for abdominal distention, blood in stool, constipation and diarrhea.   Endocrine: Positive for polydipsia and polyuria.   Genitourinary:  Negative for difficulty urinating and dysuria.   Musculoskeletal:  Negative for neck pain and neck stiffness.   Skin:  Negative for pallor and rash.   Allergic/Immunologic: Negative for immunocompromised state.   Neurological:  Positive for light-headedness. Negative for seizures, syncope, facial asymmetry and weakness.   Psychiatric/Behavioral:  Positive for agitation and confusion. Negative for behavioral problems.        Objective:     Vital Signs (Most Recent):  Temp: 98.5 °F (36.9 °C) (01/28/23 0600)  Pulse: 95 (01/28/23 0600)  Resp: 15 (01/28/23 0600)  BP: 134/67 (01/28/23 0600)  SpO2: 99 % (01/28/23 0600) Vital Signs (24h Range):  Temp:  [97.8 °F (36.6 °C)-98.6 °F (37 °C)] 98.5 °F (36.9 °C)  Pulse:  [] 95  Resp:  [15-24] 15  SpO2:  [97 %-100 %] 99 %  BP: (113-162)/(55-86) 134/67     Weight: 55.8 kg (123 lb 0.3 oz)  Body mass index is 18.7 kg/m².    Intake/Output Summary (Last 24 hours) at 1/28/2023 0817  Last data filed at 1/28/2023 0600  Gross per 24 hour   Intake 2428.56 ml   Output --   Net 2428.56 ml      Physical Exam  Vitals and nursing note reviewed.   Constitutional:       General: He is in acute distress.       Appearance: He is well-developed and normal weight. He is ill-appearing, toxic-appearing and diaphoretic.   HENT:      Head: Normocephalic and atraumatic.      Nose: Nose normal. No congestion.      Mouth/Throat:      Mouth: Mucous membranes are dry.      Pharynx: No oropharyngeal exudate.   Eyes:      General: No scleral icterus.     Pupils: Pupils are equal, round, and reactive to light.   Neck:      Thyroid: No thyromegaly.   Cardiovascular:      Rate and Rhythm: Regular rhythm. Tachycardia present.      Heart sounds:     No friction rub. No gallop.   Pulmonary:      Effort: Pulmonary effort is normal.      Breath sounds: Normal breath sounds. No stridor. No wheezing or rales.   Abdominal:      General: There is no distension.      Palpations: Abdomen is soft.      Tenderness: There is abdominal tenderness. There is guarding.   Musculoskeletal:         General: No swelling or deformity. Normal range of motion.      Cervical back: Normal range of motion. No rigidity.      Right lower leg: No edema.      Left lower leg: No edema.   Lymphadenopathy:      Cervical: No cervical adenopathy.   Skin:     General: Skin is warm.      Capillary Refill: Capillary refill takes less than 2 seconds.      Coloration: Skin is not jaundiced.      Findings: No bruising.   Neurological:      Mental Status: He is alert and oriented to person, place, and time.      Cranial Nerves: No cranial nerve deficit.      Motor: No weakness.   Psychiatric:         Mood and Affect: Mood normal.         Behavior: Behavior normal.         Thought Content: Thought content normal.         Judgment: Judgment normal.           Recent Results (from the past 24 hour(s))   POCT glucose    Collection Time: 01/27/23  7:03 PM   Result Value Ref Range    POCT Glucose >500 (H) 70 - 110 mg/dL   CBC auto differential    Collection Time: 01/27/23  7:07 PM   Result Value Ref Range    WBC 18.74 (H) 3.90 - 12.70 K/uL    RBC 5.71 4.60 - 6.20 M/uL    Hemoglobin 14.9  14.0 - 18.0 g/dL    Hematocrit 47.9 40.0 - 54.0 %    MCV 84 82 - 98 fL    MCH 26.1 (L) 27.0 - 31.0 pg    MCHC 31.1 (L) 32.0 - 36.0 g/dL    RDW 13.2 11.5 - 14.5 %    Platelets 420 150 - 450 K/uL    MPV 9.9 9.2 - 12.9 fL    Immature Granulocytes 0.6 (H) 0.0 - 0.5 %    Gran # (ANC) 15.9 (H) 1.8 - 7.7 K/uL    Immature Grans (Abs) 0.12 (H) 0.00 - 0.04 K/uL    Lymph # 1.2 1.0 - 4.8 K/uL    Mono # 1.5 (H) 0.3 - 1.0 K/uL    Eos # 0.0 0.0 - 0.5 K/uL    Baso # 0.05 0.00 - 0.20 K/uL    nRBC 0 0 /100 WBC    Gran % 84.8 (H) 38.0 - 73.0 %    Lymph % 6.1 (L) 18.0 - 48.0 %    Mono % 8.0 4.0 - 15.0 %    Eosinophil % 0.2 0.0 - 8.0 %    Basophil % 0.3 0.0 - 1.9 %    Differential Method Automated    Comprehensive metabolic panel    Collection Time: 01/27/23  7:07 PM   Result Value Ref Range    Sodium 127 (L) 136 - 145 mmol/L    Potassium 6.8 (HH) 3.5 - 5.1 mmol/L    Chloride 91 (L) 95 - 110 mmol/L    CO2 8 (LL) 23 - 29 mmol/L    Glucose 737 (HH) 70 - 110 mg/dL    BUN 27 (H) 6 - 20 mg/dL    Creatinine 2.0 (H) 0.5 - 1.4 mg/dL    Calcium 9.7 8.7 - 10.5 mg/dL    Total Protein 9.3 (H) 6.0 - 8.4 g/dL    Albumin 4.2 3.5 - 5.2 g/dL    Total Bilirubin 0.4 0.1 - 1.0 mg/dL    Alkaline Phosphatase 139 (H) 55 - 135 U/L    AST 15 10 - 40 U/L    ALT 14 10 - 44 U/L    Anion Gap 28 (H) 8 - 16 mmol/L    eGFR 38 (A) >60 mL/min/1.73 m^2   Beta - Hydroxybutyrate, Serum    Collection Time: 01/27/23  7:07 PM   Result Value Ref Range    Beta-Hydroxybutyrate 6.6 (H) 0.0 - 0.5 mmol/L   Lipase    Collection Time: 01/27/23  7:09 PM   Result Value Ref Range    Lipase 29 4 - 60 U/L   ISTAT PROCEDURE    Collection Time: 01/27/23  7:24 PM   Result Value Ref Range    POC PH 7.070 (L) 7.35 - 7.45    POC PCO2 39.1 35 - 45 mmHg    POC PO2 26 (L) 40 - 60 mmHg    POC HCO3 11.3 (L) 24 - 28 mmol/L    POC BE -18 -2 to 2 mmol/L    POC SATURATED O2 28 (L) 95 - 100 %    POC TCO2 12 (L) 24 - 29 mmol/L    Sample VENOUS     Site Other     Allens Test N/A    Urinalysis, Reflex to  Urine Culture Urine, Clean Catch    Collection Time: 01/27/23  9:27 PM    Specimen: Urine   Result Value Ref Range    Specimen UA Urine, Clean Catch     Color, UA Colorless (A) Yellow, Straw, Monet    Appearance, UA Clear Clear    pH, UA 5.0 5.0 - 8.0    Specific Gravity, UA 1.020 1.005 - 1.030    Protein, UA Trace (A) Negative    Glucose, UA 4+ (A) Negative    Ketones, UA 3+ (A) Negative    Bilirubin (UA) Negative Negative    Occult Blood UA Negative Negative    Nitrite, UA Negative Negative    Urobilinogen, UA Negative <2.0 EU/dL    Leukocytes, UA Negative Negative   Urinalysis Microscopic    Collection Time: 01/27/23  9:27 PM   Result Value Ref Range    Bacteria Rare None-Occ /hpf    Yeast, UA None None    Microscopic Comment SEE COMMENT    POCT COVID-19 Rapid Screening    Collection Time: 01/27/23 10:17 PM   Result Value Ref Range    POC Rapid COVID Negative Negative     Acceptable Yes    POCT glucose    Collection Time: 01/27/23 10:29 PM   Result Value Ref Range    POCT Glucose >500 (H) 70 - 110 mg/dL   Basic metabolic panel    Collection Time: 01/27/23 11:50 PM   Result Value Ref Range    Sodium 141 136 - 145 mmol/L    Potassium 3.4 (L) 3.5 - 5.1 mmol/L    Chloride 112 (H) 95 - 110 mmol/L    CO2 7 (LL) 23 - 29 mmol/L    Glucose 490 (HH) 70 - 110 mg/dL    BUN 25 (H) 6 - 20 mg/dL    Creatinine 1.6 (H) 0.5 - 1.4 mg/dL    Calcium 7.3 (L) 8.7 - 10.5 mg/dL    Anion Gap 22 (H) 8 - 16 mmol/L    eGFR 50 (A) >60 mL/min/1.73 m^2   Lactic acid, plasma    Collection Time: 01/27/23 11:50 PM   Result Value Ref Range    Lactate (Lactic Acid) 5.3 (HH) 0.5 - 2.2 mmol/L   ISTAT PROCEDURE    Collection Time: 01/28/23  3:38 AM   Result Value Ref Range    POC PH 7.141 (L) 7.35 - 7.45    POC PCO2 54.8 (H) 35 - 45 mmHg    POC PO2 20 (L) 40 - 60 mmHg    POC HCO3 18.7 (L) 24 - 28 mmol/L    POC BE -11 -2 to 2 mmol/L    POC SATURATED O2 19 (L) 95 - 100 %    POC TCO2 20 (L) 24 - 29 mmol/L    Sample VENOUS     Site Other      Allens Test N/A    Basic metabolic panel    Collection Time: 01/28/23  3:39 AM   Result Value Ref Range    Sodium 141 136 - 145 mmol/L    Potassium 4.9 3.5 - 5.1 mmol/L    Chloride 105 95 - 110 mmol/L    CO2 16 (L) 23 - 29 mmol/L    Glucose 272 (H) 70 - 110 mg/dL    BUN 25 (H) 6 - 20 mg/dL    Creatinine 1.8 (H) 0.5 - 1.4 mg/dL    Calcium 9.2 8.7 - 10.5 mg/dL    Anion Gap 20 (H) 8 - 16 mmol/L    eGFR 44 (A) >60 mL/min/1.73 m^2   Phosphorus    Collection Time: 01/28/23  3:39 AM   Result Value Ref Range    Phosphorus 2.9 2.7 - 4.5 mg/dL   Magnesium    Collection Time: 01/28/23  3:39 AM   Result Value Ref Range    Magnesium 2.5 1.6 - 2.6 mg/dL   Basic metabolic panel    Collection Time: 01/28/23  6:59 AM   Result Value Ref Range    Sodium 139 136 - 145 mmol/L    Potassium 5.0 3.5 - 5.1 mmol/L    Chloride 109 95 - 110 mmol/L    CO2 15 (L) 23 - 29 mmol/L    Glucose 162 (H) 70 - 110 mg/dL    BUN 24 (H) 6 - 20 mg/dL    Creatinine 1.6 (H) 0.5 - 1.4 mg/dL    Calcium 9.1 8.7 - 10.5 mg/dL    Anion Gap 15 8 - 16 mmol/L    eGFR 50 (A) >60 mL/min/1.73 m^2   CBC auto differential    Collection Time: 01/28/23  6:59 AM   Result Value Ref Range    WBC 14.21 (H) 3.90 - 12.70 K/uL    RBC 5.29 4.60 - 6.20 M/uL    Hemoglobin 13.8 (L) 14.0 - 18.0 g/dL    Hematocrit 44.1 40.0 - 54.0 %    MCV 83 82 - 98 fL    MCH 26.1 (L) 27.0 - 31.0 pg    MCHC 31.3 (L) 32.0 - 36.0 g/dL    RDW 13.2 11.5 - 14.5 %    Platelets SEE COMMENT 150 - 450 K/uL    MPV SEE COMMENT 9.2 - 12.9 fL    Immature Granulocytes 0.7 (H) 0.0 - 0.5 %    Gran # (ANC) 10.7 (H) 1.8 - 7.7 K/uL    Immature Grans (Abs) 0.10 (H) 0.00 - 0.04 K/uL    Lymph # 1.4 1.0 - 4.8 K/uL    Mono # 1.9 (H) 0.3 - 1.0 K/uL    Eos # 0.1 0.0 - 0.5 K/uL    Baso # 0.04 0.00 - 0.20 K/uL    nRBC 0 0 /100 WBC    Gran % 75.3 (H) 38.0 - 73.0 %    Lymph % 9.6 (L) 18.0 - 48.0 %    Mono % 13.5 4.0 - 15.0 %    Eosinophil % 0.6 0.0 - 8.0 %    Basophil % 0.3 0.0 - 1.9 %    Platelet Estimate Clumped (A)      Differential Method Automated    Lactic acid, plasma    Collection Time: 01/28/23  6:59 AM   Result Value Ref Range    Lactate (Lactic Acid) 6.1 (HH) 0.5 - 2.2 mmol/L       Microbiology Results (last 7 days)       Procedure Component Value Units Date/Time    Blood culture [[id number]] Collected: 01/28/23 0659    Order Status: Sent Specimen: Blood Updated: 01/28/23 0711    Narrative:      Collection has been rescheduled by LB3 at 01/28/2023 03:34 Reason:   Patient unavailable.nurse will call the lab when ready.  Collection has been rescheduled by BRUNA at 01/28/2023 03:42 Reason: Pt   is extremley combative. RN requested lab try again later  Collection has been rescheduled by LB3 at 01/28/2023 03:34 Reason:   Patient unavailable.nurse will call the lab when ready.  Collection has been rescheduled by BRUNA at 01/28/2023 03:42 Reason: Pt   is extremley combative. RN requested lab try again later             Imaging Results              X-Ray Chest AP Portable (Final result)  Result time 01/27/23 19:58:26      Final result by Linh Gordon MD (01/27/23 19:58:26)                   Impression:      No acute cardiopulmonary process identified.      Electronically signed by: Linh Gordon MD  Date:    01/27/2023  Time:    19:58               Narrative:    EXAMINATION:  XR CHEST AP PORTABLE    CLINICAL HISTORY:  hyperglycemia;    TECHNIQUE:  Single frontal view of the chest was performed.    COMPARISON:  September 2022.    FINDINGS:  Cardiac silhouette is normal in size.  Lungs are symmetrically expanded.  No evidence of focal consolidative process, pneumothorax, or significant pleural effusion.  There is chronic blunting of the right costophrenic angle.  No acute osseous abnormality identified.

## 2023-01-29 VITALS
WEIGHT: 123 LBS | SYSTOLIC BLOOD PRESSURE: 123 MMHG | BODY MASS INDEX: 18.64 KG/M2 | HEIGHT: 68 IN | TEMPERATURE: 98 F | HEART RATE: 88 BPM | OXYGEN SATURATION: 99 % | RESPIRATION RATE: 20 BRPM | DIASTOLIC BLOOD PRESSURE: 58 MMHG

## 2023-01-29 LAB
POCT GLUCOSE: 117 MG/DL (ref 70–110)
POCT GLUCOSE: 133 MG/DL (ref 70–110)
POCT GLUCOSE: 143 MG/DL (ref 70–110)
POCT GLUCOSE: 222 MG/DL (ref 70–110)
POCT GLUCOSE: 248 MG/DL (ref 70–110)
POCT GLUCOSE: 268 MG/DL (ref 70–110)
POCT GLUCOSE: 314 MG/DL (ref 70–110)
POCT GLUCOSE: 331 MG/DL (ref 70–110)
POCT GLUCOSE: 431 MG/DL (ref 70–110)
POCT GLUCOSE: 465 MG/DL (ref 70–110)
POCT GLUCOSE: >500 MG/DL (ref 70–110)

## 2023-01-29 RX ORDER — HALOPERIDOL 5 MG/ML
1 INJECTION INTRAMUSCULAR EVERY 6 HOURS PRN
Status: DISCONTINUED | OUTPATIENT
Start: 2023-01-29 | End: 2023-01-29 | Stop reason: HOSPADM

## 2023-01-29 RX ORDER — LORAZEPAM 2 MG/ML
2 INJECTION INTRAMUSCULAR ONCE AS NEEDED
Status: DISCONTINUED | OUTPATIENT
Start: 2023-01-29 | End: 2023-01-29 | Stop reason: HOSPADM

## 2023-01-29 NOTE — NURSING
Pt began yelling, cursing and swinging at staff. Multiple attempts to redirect pt but pt was repeatedly demanding we take ivs and monitoring equipment off of him so he could leave. Doctor Alfred to bedside. Pt stated he understood his blood sugars could increase and he could die if he left but he still wanted to leave. Pt signed AMA form. Both ivs were removed intact. Pt dressed himself and left with security.

## 2023-01-29 NOTE — EICU
Pt is belligerant, screaming, pulling on lines, cursing the staff, refused to answer questions, trying to get out of bed.Last FSBG 300, refused blood work.    Ativan 2 mg, haldol 1 mg ordered for possible agitated delirium.  D/w RN

## 2023-01-29 NOTE — PLAN OF CARE
Patient remains in ICU, transfer orders placed. Weaned off of IV insulin, transitioned to subcutaneous. IV fluids infusing. Started on diet, tolerating well. Able to ambulate with stand-by assistance. Up in chair for a few hours. Complaints of pain/tingling in lower extremities. MD notified, new orders noted. Potassium replaced this AM, held last bag of 10 mEq (total given 50 mEq) per MD order. Voiding per urinal/toilet. Free of falls, injury, or breakdown. Plan of care reviewed.     Problem: Adult Inpatient Plan of Care  Goal: Plan of Care Review  Outcome: Ongoing, Progressing  Goal: Patient-Specific Goal (Individualized)  Outcome: Ongoing, Progressing  Goal: Absence of Hospital-Acquired Illness or Injury  Outcome: Ongoing, Progressing  Goal: Optimal Comfort and Wellbeing  Outcome: Ongoing, Progressing  Goal: Readiness for Transition of Care  Outcome: Ongoing, Progressing

## 2023-01-29 NOTE — CARE UPDATE
The patient has chosen to leave the hospital against our medical advice. The patient is clinically not intoxicated, free from distracting pain, appears to have intact insight, judgment and reason and in my medical opinion has the capacity to make decisions. The patient is also not under any duress to leave the hospital. In this scenario, it would be battery to subject a patient to treatment against his will. I have voiced my concerns for the patient's health given that a full evaluation and treatment had not occurred. I have discussed the need for continued evaluation to determine if their symptoms are caused by a condition that present risk of death or morbidity. Risks including but not limited to death, permanent disability, prolonged hospitalization, prolonged illness, were discussed. I tried offering alternative options in hopes that the patient might be amenable to partial evaluation and treatment which would be medically beneficial to the patient, though the patient declined my options and insisted on leaving. Because I have been unable to convince the patient to stay, I answered all of their questions about their condition and asked them to return to the ED as soon as possible to complete their evaluation, especially if their symptoms worsen or do not improve. I emphasized that leaving against medical advice does not preclude returning here for further evaluation. I asked the patient to return if they change their mind about the further evaluation and treatment. I strongly encouraged the patient to return to this Emergency Department or any Emergency Department at any time, particularly with worsening symptoms.

## 2023-01-30 ENCOUNTER — TELEPHONE (OUTPATIENT)
Dept: HEPATOLOGY | Facility: CLINIC | Age: 58
End: 2023-01-30
Payer: MEDICARE

## 2023-01-30 LAB
HCV RNA SERPL QL NAA+PROBE: NOT DETECTED
HCV RNA SPEC NAA+PROBE-ACNC: NOT DETECTED IU/ML

## 2023-01-30 NOTE — TELEPHONE ENCOUNTER
Dr. Jose Mitchell ordered that patient be scheduled for a hepatology consult visit for hep c.  Patient hep c quant positive.  Attempt made to reach patient for scheduling.  LVM asking that he call hepatology.

## 2023-01-31 NOTE — DISCHARGE SUMMARY
[facility] Medicine  Discharge Summary      Patient Name: Cali Mabry  MRN: [id number]  [facility]: [id number]  Patient Class: IP- Inpatient  Admission Date: 2023  Hospital Length of Stay: 2 days  Discharge Date and Time: 2023  4:27 AM  Attending Physician: Tiff att. providers found   Discharging Provider: Josep Simon MD  Primary Care Provider: Primary Doctor Tiff    Primary Care Team: Networked reference to record PCT     HPI:   This is a 57 year old male with a PMHx of T1DM, COPD (not on O2), CAD, HTN, HLD, substance use (including IVDU), mood disorder who presented with hyperglycemia.     Patient reports feeling unwell for the last 3 days. Reportedly he last took his insulin around that time. He is lethargic during the interview but responsive. He reports having nausea, vomiting and diarrhea. He reports recent drug use but did not specify when/what the last time he used. In the ED, he was tachycardic, tachypnic. Labs were suggestive of DKA (PH: 7.0, HCO3: 12, B, K: 6.8, A), and showed leukocytosis (18.7), elevated creatinine (2.0 - baseline of 1.0), hyponatremia (127). CXR showed no acute process. He was given fluids and wa started on an insulin ggt. The patient was admitted to the ICU for further management.         * No surgery found *      Hospital Course:   Admitted with DKA [calculated anion gap 36, bicarb 8, glucose 737, beta hydroxybutyrate 6.6, and pH 7.07]. Lactic acid 6 with a leukocytosis of 18 K. Placed in ICU on IV insulin and DKA protocol.  Patient aggressive and physical was staff, has done this in the past physical restraints ordered.  Will give him low-dose Depakote Q 12 in hopes of removing physical restraints.    On , the patient became more agitated and requested to be discharged. He understood the risks of leaving against medical advice and was able to verbalize and repeat back the risks and potential consequences. See care update note.     "    Goals of Care Treatment Preferences:  Code Status: Full Code      Consults:     No new Assessment & Plan notes have been filed under this hospital service since the last note was generated.  Service: Hospital Medicine    Final Active Diagnoses:    Diagnosis Date Noted POA    PRINCIPAL PROBLEM:  Diabetic ketoacidosis without coma associated with type 1 diabetes mellitus [E10.10] 11/24/2021 Yes    RAMYA (acute kidney injury) [N17.9] 03/07/2022 Yes    CAD (coronary artery disease) [I25.10] 08/13/2020 Yes     Chronic    COPD (chronic obstructive pulmonary disease) [J44.9] 08/08/2020 Yes    Aggression [R46.89] 01/28/2023 Yes    Leukocytosis [D72.829] 01/27/2023 Yes    Hyperkalemia [E87.5] 04/13/2019 Yes    Hyponatremia [E87.1] 04/13/2019 Yes    Polysubstance use disorder [F19.90] 03/12/2019 Yes    Hyperlipidemia [E78.5] 11/23/2017 Yes     Chronic    Chronic hepatitis C [B18.2] 11/23/2017 Yes     Chronic    Essential hypertension [I10] 04/03/2016 Yes     Chronic      Problems Resolved During this Admission:       Discharged Condition: against medical advice    Disposition: Left Against Medical Adv*    Follow Up:    Patient Instructions:      Ambulatory referral/consult to Hepatitis C Clinic   Standing Status: Future   Referral Priority: Routine Referral Type: Consultation   Number of Visits Requested: 1       Significant Diagnostic Studies: See chart     Pending Diagnostic Studies:     Procedure Component Value Units Date/Time    Rhythm strip [[id number]]     Order Status: Sent Lab Status: No result          Medications:  Reconciled Home Medications:      Medication List      ASK your doctor about these medications    aspirin 81 MG Chew  Take 1 tablet (81 mg total) by mouth once daily.     atorvastatin 20 MG tablet  Commonly known as: LIPITOR  Take 1 tablet (20 mg total) by mouth once daily.     BD ULTRA-FINE GERTRUDIS PEN NEEDLE 32 gauge x 5/32" Ndle  Generic drug: pen needle, diabetic  1 each by Misc.(Non-Drug; " "Combo Route) route 4 (four) times daily before meals and nightly.     blood sugar diagnostic Strp  Use To check blood glucose three times daily,     cetirizine 10 MG tablet  Commonly known as: ZYRTEC  Take 1 tablet (10 mg total) by mouth once daily.     dextromethorphan 15 mg/5 mL syrup  Take 5 mLs (15 mg total) by mouth every 4 to 6 hours as needed for Cough.     gabapentin 100 MG capsule  Commonly known as: NEURONTIN  Take 1 capsule (100 mg total) by mouth 3 (three) times daily.     insulin detemir U-100 100 unit/mL (3 mL) Inpn pen  Commonly known as: Levemir FLEXTOUCH  Inject 40 Units into the skin every evening.     insulin lispro 100 unit/mL pen  Inject 7 Units into the skin 3 (three) times daily with meals.     insulin syringe-needle U-100 0.5 mL 31 gauge x 5/16" Syrg  1 each by Misc.(Non-Drug; Combo Route) route 3 (three) times daily.     lancets Misc  To check BG 3 times daily, to use with insurance preferred meter     naloxone 4 mg/actuation Spry  Commonly known as: NARCAN  4mg by nasal route as needed for opioid overdose; may repeat every 2-3 minutes in alternating nostrils until medical help arrives. Call 911     nitroGLYCERIN 0.4 MG SL tablet  Commonly known as: NITROSTAT  Place 1 tablet (0.4 mg total) under the tongue every 5 (five) minutes as needed for Chest pain.            Indwelling Lines/Drains at time of discharge:   Lines/Drains/Airways     None                 Time spent on the discharge of patient: 20 minutes    Critical care time spent on the evaluation and treatment of severe organ dysfunction, review of pertinent labs and imaging studies, discussions with consulting providers and discussions with patient/family: 20 minutes.     Josep Simon MD  Department of Hospital Medicine  [facility] - Intensive Care  "

## 2023-02-01 LAB — BACTERIA BLD CULT: NORMAL

## 2023-02-02 ENCOUNTER — HOSPITAL ENCOUNTER (INPATIENT)
Facility: HOSPITAL | Age: 58
LOS: 2 days | Discharge: PSYCHIATRIC OR SUBSTANCE USE FACILITY | DRG: 637 | End: 2023-02-04
Attending: EMERGENCY MEDICINE | Admitting: INTERNAL MEDICINE
Payer: MEDICARE

## 2023-02-02 DIAGNOSIS — R45.6 VIOLENT BEHAVIOR: ICD-10-CM

## 2023-02-02 DIAGNOSIS — R11.10 VOMITING: ICD-10-CM

## 2023-02-02 DIAGNOSIS — E86.0 DEHYDRATION: ICD-10-CM

## 2023-02-02 DIAGNOSIS — N17.9 AKI (ACUTE KIDNEY INJURY): ICD-10-CM

## 2023-02-02 DIAGNOSIS — E11.10 DKA (DIABETIC KETOACIDOSIS): ICD-10-CM

## 2023-02-02 DIAGNOSIS — E10.11 TYPE 1 DIABETES MELLITUS WITH KETOACIDOTIC COMA: Primary | ICD-10-CM

## 2023-02-02 LAB
ALBUMIN SERPL BCP-MCNC: 4 G/DL (ref 3.5–5.2)
ALLENS TEST: ABNORMAL
ALLENS TEST: ABNORMAL
ALP SERPL-CCNC: 133 U/L (ref 55–135)
ALT SERPL W/O P-5'-P-CCNC: 20 U/L (ref 10–44)
AMPHET+METHAMPHET UR QL: ABNORMAL
ANION GAP SERPL CALC-SCNC: 21 MMOL/L (ref 8–16)
ANION GAP SERPL CALC-SCNC: 29 MMOL/L (ref 8–16)
AST SERPL-CCNC: 22 U/L (ref 10–40)
B-OH-BUTYR BLD STRIP-SCNC: 6.8 MMOL/L (ref 0–0.5)
BACTERIA #/AREA URNS HPF: NORMAL /HPF
BARBITURATES UR QL SCN>200 NG/ML: NEGATIVE
BASOPHILS # BLD AUTO: 0.05 K/UL (ref 0–0.2)
BASOPHILS NFR BLD: 0.4 % (ref 0–1.9)
BENZODIAZ UR QL SCN>200 NG/ML: NEGATIVE
BILIRUB SERPL-MCNC: 0.4 MG/DL (ref 0.1–1)
BILIRUB UR QL STRIP: NEGATIVE
BUN SERPL-MCNC: 24 MG/DL (ref 6–20)
BUN SERPL-MCNC: 28 MG/DL (ref 6–20)
BZE UR QL SCN: NEGATIVE
CALCIUM SERPL-MCNC: 7.6 MG/DL (ref 8.7–10.5)
CALCIUM SERPL-MCNC: 9.1 MG/DL (ref 8.7–10.5)
CANNABINOIDS UR QL SCN: NEGATIVE
CHLORIDE SERPL-SCNC: 109 MMOL/L (ref 95–110)
CHLORIDE SERPL-SCNC: 97 MMOL/L (ref 95–110)
CLARITY UR: CLEAR
CO2 SERPL-SCNC: 6 MMOL/L (ref 23–29)
CO2 SERPL-SCNC: 9 MMOL/L (ref 23–29)
COLOR UR: COLORLESS
CREAT SERPL-MCNC: 1.3 MG/DL (ref 0.5–1.4)
CREAT SERPL-MCNC: 1.9 MG/DL (ref 0.5–1.4)
CREAT UR-MCNC: 17.4 MG/DL (ref 23–375)
DIFFERENTIAL METHOD: ABNORMAL
EOSINOPHIL # BLD AUTO: 0.1 K/UL (ref 0–0.5)
EOSINOPHIL NFR BLD: 0.6 % (ref 0–8)
ERYTHROCYTE [DISTWIDTH] IN BLOOD BY AUTOMATED COUNT: 13.6 % (ref 11.5–14.5)
EST. GFR  (NO RACE VARIABLE): 41 ML/MIN/1.73 M^2
EST. GFR  (NO RACE VARIABLE): >60 ML/MIN/1.73 M^2
ETHANOL SERPL-MCNC: <10 MG/DL
GLUCOSE SERPL-MCNC: 408 MG/DL (ref 70–110)
GLUCOSE SERPL-MCNC: 786 MG/DL (ref 70–110)
GLUCOSE UR QL STRIP: ABNORMAL
HCO3 UR-SCNC: 11.2 MMOL/L (ref 24–28)
HCO3 UR-SCNC: 33.5 MMOL/L (ref 24–28)
HCT VFR BLD AUTO: 49 % (ref 40–54)
HGB BLD-MCNC: 14.6 G/DL (ref 14–18)
HGB UR QL STRIP: NEGATIVE
IMM GRANULOCYTES # BLD AUTO: 0.1 K/UL (ref 0–0.04)
IMM GRANULOCYTES NFR BLD AUTO: 0.8 % (ref 0–0.5)
KETONES UR QL STRIP: ABNORMAL
LACTATE SERPL-SCNC: 2.2 MMOL/L (ref 0.5–2.2)
LEUKOCYTE ESTERASE UR QL STRIP: NEGATIVE
LIPASE SERPL-CCNC: 45 U/L (ref 4–60)
LYMPHOCYTES # BLD AUTO: 1.2 K/UL (ref 1–4.8)
LYMPHOCYTES NFR BLD: 9.5 % (ref 18–48)
MAGNESIUM SERPL-MCNC: 2.3 MG/DL (ref 1.6–2.6)
MCH RBC QN AUTO: 25.5 PG (ref 27–31)
MCHC RBC AUTO-ENTMCNC: 29.8 G/DL (ref 32–36)
MCV RBC AUTO: 86 FL (ref 82–98)
METHADONE UR QL SCN>300 NG/ML: NEGATIVE
MICROSCOPIC COMMENT: NORMAL
MONOCYTES # BLD AUTO: 1.1 K/UL (ref 0.3–1)
MONOCYTES NFR BLD: 8.9 % (ref 4–15)
NEUTROPHILS # BLD AUTO: 9.9 K/UL (ref 1.8–7.7)
NEUTROPHILS NFR BLD: 79.8 % (ref 38–73)
NITRITE UR QL STRIP: NEGATIVE
NRBC BLD-RTO: 0 /100 WBC
OPIATES UR QL SCN: NEGATIVE
PCO2 BLDA: 37.5 MMHG (ref 35–45)
PCO2 BLDA: 43.5 MMHG (ref 35–45)
PCP UR QL SCN>25 NG/ML: NEGATIVE
PH SMN: 7.08 [PH] (ref 7.35–7.45)
PH SMN: 7.5 [PH] (ref 7.35–7.45)
PH UR STRIP: 5 [PH] (ref 5–8)
PLATELET # BLD AUTO: 454 K/UL (ref 150–450)
PMV BLD AUTO: 9.9 FL (ref 9.2–12.9)
PO2 BLDA: 33 MMHG (ref 40–60)
PO2 BLDA: 71 MMHG (ref 40–60)
POC BE: -18 MMOL/L
POC BE: 9 MMOL/L
POC SATURATED O2: 43 % (ref 95–100)
POC SATURATED O2: 95 % (ref 95–100)
POC TCO2: 12 MMOL/L (ref 24–29)
POC TCO2: 35 MMOL/L (ref 24–29)
POCT GLUCOSE: 388 MG/DL (ref 70–110)
POCT GLUCOSE: 450 MG/DL (ref 70–110)
POCT GLUCOSE: >500 MG/DL (ref 70–110)
POTASSIUM SERPL-SCNC: 5.5 MMOL/L (ref 3.5–5.1)
POTASSIUM SERPL-SCNC: 6.3 MMOL/L (ref 3.5–5.1)
PROT SERPL-MCNC: 8.8 G/DL (ref 6–8.4)
PROT UR QL STRIP: ABNORMAL
RBC # BLD AUTO: 5.73 M/UL (ref 4.6–6.2)
SAMPLE: ABNORMAL
SAMPLE: ABNORMAL
SITE: ABNORMAL
SITE: ABNORMAL
SODIUM SERPL-SCNC: 132 MMOL/L (ref 136–145)
SODIUM SERPL-SCNC: 139 MMOL/L (ref 136–145)
SP GR UR STRIP: 1.01 (ref 1–1.03)
SQUAMOUS #/AREA URNS HPF: 4 /HPF
T4 FREE SERPL-MCNC: 1.13 NG/DL (ref 0.71–1.51)
TOXICOLOGY INFORMATION: ABNORMAL
TROPONIN I SERPL DL<=0.01 NG/ML-MCNC: 0.01 NG/ML (ref 0–0.03)
TSH SERPL DL<=0.005 MIU/L-ACNC: 0.29 UIU/ML (ref 0.4–4)
URN SPEC COLLECT METH UR: ABNORMAL
UROBILINOGEN UR STRIP-ACNC: NEGATIVE EU/DL
WBC # BLD AUTO: 12.43 K/UL (ref 3.9–12.7)
YEAST URNS QL MICRO: NORMAL

## 2023-02-02 PROCEDURE — 81000 URINALYSIS NONAUTO W/SCOPE: CPT | Mod: 59 | Performed by: INTERNAL MEDICINE

## 2023-02-02 PROCEDURE — 82962 GLUCOSE BLOOD TEST: CPT

## 2023-02-02 PROCEDURE — 96375 TX/PRO/DX INJ NEW DRUG ADDON: CPT

## 2023-02-02 PROCEDURE — 82010 KETONE BODYS QUAN: CPT | Performed by: EMERGENCY MEDICINE

## 2023-02-02 PROCEDURE — 25000242 PHARM REV CODE 250 ALT 637 W/ HCPCS: Performed by: EMERGENCY MEDICINE

## 2023-02-02 PROCEDURE — 25000003 PHARM REV CODE 250: Performed by: INTERNAL MEDICINE

## 2023-02-02 PROCEDURE — 82803 BLOOD GASES ANY COMBINATION: CPT

## 2023-02-02 PROCEDURE — 96365 THER/PROPH/DIAG IV INF INIT: CPT

## 2023-02-02 PROCEDURE — 93010 EKG 12-LEAD: ICD-10-PCS | Mod: ,,, | Performed by: INTERNAL MEDICINE

## 2023-02-02 PROCEDURE — 80307 DRUG TEST PRSMV CHEM ANLYZR: CPT | Performed by: INTERNAL MEDICINE

## 2023-02-02 PROCEDURE — 80048 BASIC METABOLIC PNL TOTAL CA: CPT | Mod: XB | Performed by: INTERNAL MEDICINE

## 2023-02-02 PROCEDURE — 82077 ASSAY SPEC XCP UR&BREATH IA: CPT | Performed by: EMERGENCY MEDICINE

## 2023-02-02 PROCEDURE — 83690 ASSAY OF LIPASE: CPT | Performed by: EMERGENCY MEDICINE

## 2023-02-02 PROCEDURE — 83735 ASSAY OF MAGNESIUM: CPT | Performed by: EMERGENCY MEDICINE

## 2023-02-02 PROCEDURE — 63600175 PHARM REV CODE 636 W HCPCS: Performed by: INTERNAL MEDICINE

## 2023-02-02 PROCEDURE — 93010 ELECTROCARDIOGRAM REPORT: CPT | Mod: ,,, | Performed by: INTERNAL MEDICINE

## 2023-02-02 PROCEDURE — 63600175 PHARM REV CODE 636 W HCPCS: Performed by: EMERGENCY MEDICINE

## 2023-02-02 PROCEDURE — 94640 AIRWAY INHALATION TREATMENT: CPT

## 2023-02-02 PROCEDURE — 12000002 HC ACUTE/MED SURGE SEMI-PRIVATE ROOM

## 2023-02-02 PROCEDURE — A4217 STERILE WATER/SALINE, 500 ML: HCPCS | Performed by: INTERNAL MEDICINE

## 2023-02-02 PROCEDURE — 84443 ASSAY THYROID STIM HORMONE: CPT | Performed by: EMERGENCY MEDICINE

## 2023-02-02 PROCEDURE — 83605 ASSAY OF LACTIC ACID: CPT | Performed by: EMERGENCY MEDICINE

## 2023-02-02 PROCEDURE — 84484 ASSAY OF TROPONIN QUANT: CPT | Performed by: EMERGENCY MEDICINE

## 2023-02-02 PROCEDURE — 80053 COMPREHEN METABOLIC PANEL: CPT | Performed by: EMERGENCY MEDICINE

## 2023-02-02 PROCEDURE — 99900035 HC TECH TIME PER 15 MIN (STAT)

## 2023-02-02 PROCEDURE — 85025 COMPLETE CBC W/AUTO DIFF WBC: CPT | Performed by: EMERGENCY MEDICINE

## 2023-02-02 PROCEDURE — 84439 ASSAY OF FREE THYROXINE: CPT | Performed by: EMERGENCY MEDICINE

## 2023-02-02 PROCEDURE — 99291 CRITICAL CARE FIRST HOUR: CPT | Mod: 25

## 2023-02-02 PROCEDURE — 93005 ELECTROCARDIOGRAM TRACING: CPT

## 2023-02-02 PROCEDURE — 96360 HYDRATION IV INFUSION INIT: CPT | Mod: 59

## 2023-02-02 PROCEDURE — 25000003 PHARM REV CODE 250: Performed by: EMERGENCY MEDICINE

## 2023-02-02 RX ORDER — ONDANSETRON 2 MG/ML
4 INJECTION INTRAMUSCULAR; INTRAVENOUS EVERY 6 HOURS PRN
Status: DISCONTINUED | OUTPATIENT
Start: 2023-02-02 | End: 2023-02-04 | Stop reason: HOSPADM

## 2023-02-02 RX ORDER — CALCIUM GLUCONATE 20 MG/ML
1 INJECTION, SOLUTION INTRAVENOUS
Status: COMPLETED | OUTPATIENT
Start: 2023-02-02 | End: 2023-02-02

## 2023-02-02 RX ORDER — ACETAMINOPHEN 325 MG/1
650 TABLET ORAL EVERY 4 HOURS PRN
Status: DISCONTINUED | OUTPATIENT
Start: 2023-02-02 | End: 2023-02-04 | Stop reason: HOSPADM

## 2023-02-02 RX ORDER — SODIUM CHLORIDE 9 MG/ML
125 INJECTION, SOLUTION INTRAVENOUS CONTINUOUS
Status: DISCONTINUED | OUTPATIENT
Start: 2023-02-02 | End: 2023-02-03

## 2023-02-02 RX ORDER — PROCHLORPERAZINE EDISYLATE 5 MG/ML
5 INJECTION INTRAMUSCULAR; INTRAVENOUS EVERY 6 HOURS PRN
Status: DISCONTINUED | OUTPATIENT
Start: 2023-02-02 | End: 2023-02-04 | Stop reason: HOSPADM

## 2023-02-02 RX ORDER — DEXTROSE MONOHYDRATE AND SODIUM CHLORIDE 5; .45 G/100ML; G/100ML
125 INJECTION, SOLUTION INTRAVENOUS CONTINUOUS PRN
Status: DISCONTINUED | OUTPATIENT
Start: 2023-02-02 | End: 2023-02-03

## 2023-02-02 RX ORDER — SODIUM CHLORIDE 0.9 % (FLUSH) 0.9 %
10 SYRINGE (ML) INJECTION
Status: DISCONTINUED | OUTPATIENT
Start: 2023-02-02 | End: 2023-02-04 | Stop reason: HOSPADM

## 2023-02-02 RX ORDER — NAPROXEN SODIUM 220 MG/1
81 TABLET, FILM COATED ORAL DAILY
Status: DISCONTINUED | OUTPATIENT
Start: 2023-02-03 | End: 2023-02-04 | Stop reason: HOSPADM

## 2023-02-02 RX ORDER — INDOMETHACIN 25 MG/1
50 CAPSULE ORAL
Status: COMPLETED | OUTPATIENT
Start: 2023-02-02 | End: 2023-02-02

## 2023-02-02 RX ORDER — ATORVASTATIN CALCIUM 10 MG/1
20 TABLET, FILM COATED ORAL DAILY
Status: DISCONTINUED | OUTPATIENT
Start: 2023-02-03 | End: 2023-02-04 | Stop reason: HOSPADM

## 2023-02-02 RX ORDER — HEPARIN SODIUM 5000 [USP'U]/ML
5000 INJECTION, SOLUTION INTRAVENOUS; SUBCUTANEOUS EVERY 8 HOURS
Status: DISCONTINUED | OUTPATIENT
Start: 2023-02-02 | End: 2023-02-04 | Stop reason: HOSPADM

## 2023-02-02 RX ORDER — INSULIN DEGLUDEC 200 U/ML
30 INJECTION, SOLUTION SUBCUTANEOUS DAILY
Status: ON HOLD | COMMUNITY
Start: 2023-01-30 | End: 2023-02-04 | Stop reason: HOSPADM

## 2023-02-02 RX ORDER — GABAPENTIN 400 MG/1
400 CAPSULE ORAL 3 TIMES DAILY
Status: DISCONTINUED | OUTPATIENT
Start: 2023-02-03 | End: 2023-02-03

## 2023-02-02 RX ORDER — GABAPENTIN 400 MG/1
400 CAPSULE ORAL 3 TIMES DAILY
COMMUNITY
End: 2023-08-16 | Stop reason: DRUGHIGH

## 2023-02-02 RX ORDER — POLYETHYLENE GLYCOL 3350 17 G/17G
17 POWDER, FOR SOLUTION ORAL DAILY
Status: DISCONTINUED | OUTPATIENT
Start: 2023-02-03 | End: 2023-02-04 | Stop reason: HOSPADM

## 2023-02-02 RX ORDER — CETIRIZINE HYDROCHLORIDE 10 MG/1
10 TABLET ORAL DAILY
Status: DISCONTINUED | OUTPATIENT
Start: 2023-02-03 | End: 2023-02-04 | Stop reason: HOSPADM

## 2023-02-02 RX ORDER — TALC
6 POWDER (GRAM) TOPICAL NIGHTLY PRN
Status: DISCONTINUED | OUTPATIENT
Start: 2023-02-02 | End: 2023-02-04 | Stop reason: HOSPADM

## 2023-02-02 RX ORDER — IPRATROPIUM BROMIDE AND ALBUTEROL SULFATE 2.5; .5 MG/3ML; MG/3ML
3 SOLUTION RESPIRATORY (INHALATION)
Status: DISCONTINUED | OUTPATIENT
Start: 2023-02-02 | End: 2023-02-02

## 2023-02-02 RX ORDER — AMOXICILLIN 250 MG
1 CAPSULE ORAL 2 TIMES DAILY PRN
Status: DISCONTINUED | OUTPATIENT
Start: 2023-02-02 | End: 2023-02-04 | Stop reason: HOSPADM

## 2023-02-02 RX ADMIN — SODIUM CHLORIDE 125 ML/HR: 9 INJECTION, SOLUTION INTRAVENOUS at 11:02

## 2023-02-02 RX ADMIN — CALCIUM GLUCONATE 1 G: 20 INJECTION, SOLUTION INTRAVENOUS at 06:02

## 2023-02-02 RX ADMIN — IPRATROPIUM BROMIDE AND ALBUTEROL SULFATE 3 ML: .5; 3 SOLUTION RESPIRATORY (INHALATION) at 07:02

## 2023-02-02 RX ADMIN — HEPARIN SODIUM 5000 UNITS: 5000 INJECTION INTRAVENOUS; SUBCUTANEOUS at 11:02

## 2023-02-02 RX ADMIN — INSULIN HUMAN 10 UNITS: 100 INJECTION, SOLUTION PARENTERAL at 07:02

## 2023-02-02 RX ADMIN — SODIUM CHLORIDE, POTASSIUM CHLORIDE, SODIUM LACTATE AND CALCIUM CHLORIDE 1000 ML: 600; 310; 30; 20 INJECTION, SOLUTION INTRAVENOUS at 08:02

## 2023-02-02 RX ADMIN — SODIUM BICARBONATE 50 MEQ: 84 INJECTION, SOLUTION INTRAVENOUS at 07:02

## 2023-02-02 RX ADMIN — SODIUM CHLORIDE, POTASSIUM CHLORIDE, SODIUM LACTATE AND CALCIUM CHLORIDE 1000 ML: 600; 310; 30; 20 INJECTION, SOLUTION INTRAVENOUS at 06:02

## 2023-02-02 RX ADMIN — INSULIN HUMAN 2.5 UNITS/HR: 1 INJECTION, SOLUTION INTRAVENOUS at 07:02

## 2023-02-02 RX ADMIN — SODIUM BICARBONATE: 84 INJECTION, SOLUTION INTRAVENOUS at 10:02

## 2023-02-02 RX ADMIN — INSULIN HUMAN 1.75 UNITS/HR: 1 INJECTION, SOLUTION INTRAVENOUS at 10:02

## 2023-02-02 NOTE — ED TRIAGE NOTES
Pt states that his upper abdomen is hurting 9/10 pain. Patient has associated symptoms of Nausea/vomiting. Pt is sleepy but wakes up with verbal stimulus. Pt. Gets verbally abusive when asked a lot of questions. Pt CBG greater than 500. Pt arrived by EMS.

## 2023-02-02 NOTE — Clinical Note
Diagnosis: DKA (diabetic ketoacidosis) [712729]   Admitting Provider:: PAWEL LERNER [[id number]]   Future Attending Provider: CARY HERNANDEZ [[id number]]   Reason for IP Medical Treatment  (Clinical interventions that can only be accomplished in the IP setting? ) :: DKA   Estimated Length of Stay:: 2 midnights   I certify that Inpatient services for greater than or equal to 2 midnights are medically necessary:: Yes   Plans for Post-Acute care--if anticipated (pick the single best option):: A. No post acute care anticipated at this time

## 2023-02-02 NOTE — LETTER
Patient: Domenic Mabry  YOB: 1965  Date: 2/3/2023 Time: 5:36 AM  Location: [facility]    Leaving the Hospital Against Medical Advice    Chart #:[id number]    This will certify that I, the undersigned,    ______________________________________________________________________    A patient in the above named medical center, having requested discharge and removal from the medical center against the advice of my attending physician(s), hereby release [facility], its physicians, officers and employees, severally and individually, from any and all liability of any nature whatsoever for any injury or harm or complication of any kind that may result directly or indirectly, by reason of my terminating my stay as a patient at [facility] and my departure from [facility], and hereby waive any and all rights of action I may now have or later acquire as a result of my voluntary departure from [facility] and the termination of my stay as a patient therein.    This release is made with the full knowledge of the danger that may result from the action which I am taking.      Date:_______________________                         ___________________________                                                                                    Patient/Legal Representative    Witness:        ____________________________                          ___________________________  Nurse                                                                        Physician

## 2023-02-02 NOTE — LETTER
Patient: Domenic Mabry  YOB: 1965  Date: 2/3/2023 Time: 5:35 AM  Location: [facility]    Leaving the Hospital Against Medical Advice    Chart #:[id number]    This will certify that I, the undersigned,    ______________________________________________________________________    A patient in the above named medical center, having requested discharge and removal from the medical center against the advice of my attending physician(s), hereby release [facility], its physicians, officers and employees, severally and individually, from any and all liability of any nature whatsoever for any injury or harm or complication of any kind that may result directly or indirectly, by reason of my terminating my stay as a patient at [facility] and my departure from [facility], and hereby waive any and all rights of action I may now have or later acquire as a result of my voluntary departure from [facility] and the termination of my stay as a patient therein.    This release is made with the full knowledge of the danger that may result from the action which I am taking.      Date:_______________________                         ___________________________                                                                                    Patient/Legal Representative    Witness:        ____________________________                          ___________________________  Nurse                                                                        Physician

## 2023-02-02 NOTE — ED PROVIDER NOTES
"Encounter Date: 2/2/2023    SCRIBE #1 NOTE: I, Mariah Carvalho, am scribing for, and in the presence of,  Fabiola Stringer MD.     History     Chief Complaint   Patient presents with    Emesis     Presents to the ED via EMS with c/o abdominal pain, N/V x 1 day. Hx of DM. CBG via EMS read HIGH.      Cali Mabry is a 57 y.o. male, with a PMHx of IDDM w/ multiple instances of DKA, HTN, Hep C, heart failure, substance abuse, psychiatric disorder, who presents to the ED BIB EMS with vomiting. Patient currently poor historian secondary to agitation. Patient reports he has been experiencing intractable vomiting for the last 3d. Further endorses mild upper abdominal pain, dysuria, frequency. Additional history is provided by independent historian, EMS, who report his CBG en route read "HIGH". Patient answers with "I think so" when asked if he took his insulin today, but also repsonds "I don't know" when he last had insulin. He states he usually self administers his insulin and denies regularly checking his CBG at home. Per EMS, he lives at home with his parents who are unsure when he last had insulin. No other exacerbating or alleviating factors. Denies chest pain, groin pain, or other associated symptoms. Patient currently denies EtOH or illicit drug use. He admits to tobacco use. Patient reports a PSHx of cardiac stent placement, left heart cath, EGD, shoulder surgery. NKDA. Patient was reently admitted for DKA on 1/27/23. Medication list upon discharge on 1/29/23: Atorvaastatin, aspirin, cetirizine, dextromethorphan, gabapentin, levimer 40 units in the AM, insulin 7 units 3 times daily w/ meals, lancets, narcan, nitroglycerin.     The history is provided by the patient and the EMS personnel.   Review of patient's allergies indicates:  No Known Allergies  Past Medical History:   Diagnosis Date    Acute on chronic pancreatitis 04/03/2016    Addiction to drug     CHF (congestive heart failure)     DKA (diabetic " ketoacidoses) 2020    DM (diabetes mellitus), type 1     HCV (hepatitis C virus)     high VL     HTN (hypertension)     Hx of psychiatric care     Hypomagnesemia 2020    IVDU (intravenous drug user)     Heroin    Pancreatitis     Psychiatric problem     Substance abuse      Past Surgical History:   Procedure Laterality Date    CARDIAC SURGERY      stent placed. ([facility])    ESOPHAGOGASTRODUODENOSCOPY N/A 3/5/2020    Gastritis.  No H pylori.  Completed PPI for 1 month.  Procedure: EGD (ESOPHAGOGASTRODUODENOSCOPY);  Surgeon: Brinda Rene MD;  Location: [facility] ENDO;  Service: Endoscopy;  Laterality: N/A;  [id number] A; [phone]    LEFT HEART CATHETERIZATION Left 2020    Procedure: Left heart cath;  Surgeon: Fito Rangel MD;  Location: [facility] CATH LAB;  Service: Cardiology;  Laterality: Left;    SHOULDER SURGERY      right shoulder, spur removal.     Family History   Problem Relation Age of Onset    Asthma Mother      Social History     Tobacco Use    Smoking status: Every Day     Packs/day: 1.00     Years: 20.00     Pack years: 20.00     Types: Cigarettes     Start date: 4/3/1981     Last attempt to quit: 2020     Years since quittin.6    Smokeless tobacco: Never    Tobacco comments:     states approx 1 per day.   Substance Use Topics    Alcohol use: Not Currently     Alcohol/week: 0.0 standard drinks    Drug use: Yes     Types: Heroin, IV, Marijuana, Amphetamines     Review of Systems   Constitutional:  Negative for chills and fever.   HENT:  Negative for drooling and voice change.    Eyes:  Negative for photophobia and visual disturbance.   Respiratory:  Negative for shortness of breath and wheezing.    Cardiovascular:  Negative for chest pain and leg swelling.   Gastrointestinal:  Positive for abdominal pain, nausea and vomiting. Negative for diarrhea.   Endocrine: Positive for polyuria.   Genitourinary:  Positive for dysuria and frequency. Negative for hematuria, penile pain,  testicular pain and urgency.   Musculoskeletal:  Negative for myalgias and neck pain.   Skin:  Negative for rash and wound.   Neurological:  Negative for syncope, weakness and numbness.   Psychiatric/Behavioral:  Positive for agitation. Negative for confusion and suicidal ideas.    All other systems reviewed and are negative.    Physical Exam     Initial Vitals [02/02/23 1634]   BP Pulse Resp Temp SpO2   (!) 164/75 (!) 114 (!) 26 97.8 °F (36.6 °C) 100 %      MAP       --         Physical Exam    Nursing note and vitals reviewed.  Constitutional: He appears well-developed and well-nourished. He is not diaphoretic. No distress.   HENT:   Head: Normocephalic and atraumatic.   Right Ear: External ear normal.   Left Ear: External ear normal.   Mouth/Throat: Oropharynx is clear and moist. No oropharyngeal exudate.   Dry mucous membranes.    Eyes: Conjunctivae and EOM are normal. Pupils are equal, round, and reactive to light. Right eye exhibits no discharge. Left eye exhibits no discharge.   Neck: Neck supple. No JVD present.   Normal range of motion.  Cardiovascular:  Normal rate, regular rhythm, normal heart sounds and intact distal pulses.     Exam reveals no gallop and no friction rub.       No murmur heard.  Tachycardic.    Pulmonary/Chest: Breath sounds normal. No respiratory distress. He has no wheezes. He has no rhonchi. He has no rales.   Abdominal: Abdomen is soft. Bowel sounds are normal. He exhibits no distension. There is abdominal tenderness.   Diffuse abdominal tenderness. No CVA tenderness.  There is no rebound and no guarding.   Musculoskeletal:         General: No tenderness or edema. Normal range of motion.      Cervical back: Normal range of motion and neck supple.     Lymphadenopathy:     He has no cervical adenopathy.   Neurological: He is alert and oriented to person, place, and time. He has normal strength. No cranial nerve deficit or sensory deficit. GCS score is 15. GCS eye subscore is 4. GCS  verbal subscore is 5. GCS motor subscore is 6.   Agitated, able to get up and urinate on his own on the floor.    Skin: Skin is warm and dry. Capillary refill takes less than 2 seconds.   No wounds to legs or feet.    Psychiatric:   Agitated easily on exam, refusing to answer most questions, not fully compliant with exam.        ED Course   Critical Care    Date/Time: 2/2/2023 11:15 PM  Performed by: Fabiola Stringer MD  Authorized by: Darren Gorman MD   Direct patient critical care time: 35 minutes  Additional history critical care time: 5 minutes  Ordering / reviewing critical care time: 10 minutes  Documentation critical care time: 10 minutes  Consulting other physicians critical care time: 5 minutes  Total critical care time (exclusive of procedural time) : 65 minutes  Critical care was necessary to treat or prevent imminent or life-threatening deterioration of the following conditions: metabolic crisis and dehydration.  Critical care was time spent personally by me on the following activities: development of treatment plan with patient or surrogate, interpretation of cardiac output measurements, evaluation of patient's response to treatment, examination of patient, obtaining history from patient or surrogate, ordering and performing treatments and interventions, ordering and review of laboratory studies, ordering and review of radiographic studies, pulse oximetry, re-evaluation of patient's condition and review of old charts.      Labs Reviewed   CBC W/ AUTO DIFFERENTIAL - Abnormal; Notable for the following components:       Result Value    MCH 25.5 (*)     MCHC 29.8 (*)     Platelets 454 (*)     Immature Granulocytes 0.8 (*)     Gran # (ANC) 9.9 (*)     Immature Grans (Abs) 0.10 (*)     Mono # 1.1 (*)     Gran % 79.8 (*)     Lymph % 9.5 (*)     All other components within normal limits   COMPREHENSIVE METABOLIC PANEL - Abnormal; Notable for the following components:    Sodium 132 (*)     Potassium 6.3 (*)      CO2 6 (*)     Glucose 786 (*)     BUN 28 (*)     Creatinine 1.9 (*)     Total Protein 8.8 (*)     Anion Gap 29 (*)     eGFR 41 (*)     All other components within normal limits    Narrative:     K, CO2 & GLUCOSE   critical result(s) called and verbal readback   obtained from VALENTINA POLANCO. by LM1 02/02/2023 19:04   TSH - Abnormal; Notable for the following components:    TSH 0.294 (*)     All other components within normal limits   BETA - HYDROXYBUTYRATE, SERUM - Abnormal; Notable for the following components:    Beta-Hydroxybutyrate 6.8 (*)     All other components within normal limits   BASIC METABOLIC PANEL - Abnormal; Notable for the following components:    Potassium 5.5 (*)     CO2 9 (*)     Glucose 408 (*)     BUN 24 (*)     Calcium 7.6 (*)     Anion Gap 21 (*)     All other components within normal limits    Narrative:        CO2 critical result(s) called and verbal readback obtained from   Valentina Pichardo RN ED  by 1CD 02/02/2023 22:48   POCT GLUCOSE - Abnormal; Notable for the following components:    POCT Glucose >500 (*)     All other components within normal limits   ISTAT PROCEDURE - Abnormal; Notable for the following components:    POC PH 7.084 (*)     POC PO2 33 (*)     POC HCO3 11.2 (*)     POC SATURATED O2 43 (*)     POC TCO2 12 (*)     All other components within normal limits   POCT GLUCOSE - Abnormal; Notable for the following components:    POCT Glucose >500 (*)     All other components within normal limits   POCT GLUCOSE - Abnormal; Notable for the following components:    POCT Glucose >500 (*)     All other components within normal limits   POCT GLUCOSE - Abnormal; Notable for the following components:    POCT Glucose 450 (*)     All other components within normal limits   POCT GLUCOSE - Abnormal; Notable for the following components:    POCT Glucose 388 (*)     All other components within normal limits   LACTIC ACID, PLASMA   LIPASE   MAGNESIUM   TROPONIN I   ALCOHOL,MEDICAL (ETHANOL)    T4, FREE   DRUG SCREEN PANEL, URINE EMERGENCY   URINALYSIS, REFLEX TO URINE CULTURE   POCT GLUCOSE MONITORING CONTINUOUS          Imaging Results              CT Head Without Contrast (Final result)  Result time 02/02/23 20:47:17      Final result by Jo Han MD (02/02/23 20:47:17)                   Impression:      No acute findings.  Chronic changes with ventricular enlargement, as above.  Please correlate for normal pressure hydrocephalus.  No adverse change from 08/10/2022      Electronically signed by: Jo Han  Date:    02/02/2023  Time:    20:47               Narrative:    EXAMINATION:  CT HEAD WITHOUT CONTRAST    CLINICAL HISTORY:  Mental status change, unknown cause;    TECHNIQUE:  Low dose axial images were obtained through the head.  Coronal and sagittal reformations were also performed. Contrast was not administered.    COMPARISON:  08/10/2022    FINDINGS:  Redemonstration of ventricular enlargement out of proportion to the sulci.  Mild periventricular low-attenuation, similar to prior.  No acute intracranial hemorrhage.  Small old lacunar infarct in the right side of the [place].  Mild maxillary and ethmoid sinus mucosal thickening.  Intact calvarium.                                       CT Abdomen Pelvis  Without Contrast (Final result)  Result time 02/02/23 20:50:48      Final result by Melva Mena MD (02/02/23 20:50:48)                   Impression:      No acute findings on CT abdomen and pelvis without contrast.    Moderately large colonic stool.  Question constipation.    Small fluid in the distal esophagus.  Findings may suggest esophageal dysmotility issue or reflux.    Questionable scarring at the right lung base.  If concerning for right basilar lesion, consider follow-up.      Electronically signed by: Melva Mena  Date:    02/02/2023  Time:    20:50               Narrative:    EXAMINATION:  CT ABDOMEN PELVIS WITHOUT    CLINICAL HISTORY:  Complaining of  abdominal pain, nausea, and vomiting x1 day.    TECHNIQUE:  5 mm unenhanced axial images from the lung bases through the greater trochanters were performed.  Coronal and sagittal reformatted images were provided.    COMPARISON:  11/24/2020    FINDINGS:  Within the limits of a noncontrast examination, the liver, spleen, pancreas, kidneys, and adrenal glands are unremarkable.  The gallbladder contains no calcified gallstones.    There is no gross abdominal adenopathy or ascites. There is a retroaortic left renal vein.  Moderate atherosclerotic changes are present.  There is a tiny fat containing umbilical hernia.    There are no pelvic masses or adenopathy.  There is no free pelvic fluid.  The appendix is not inflamed.    At the lung bases, there is questionable area of scarring at the right lung base..  Small fluid is seen in the distal esophagus.                                       X-Ray Chest AP Portable (Final result)  Result time 02/02/23 17:45:45      Final result by Melva Mena MD (02/02/23 17:45:45)                   Impression:      No acute intrathoracic abnormality detected.      Electronically signed by: Melva Mena  Date:    02/02/2023  Time:    17:45               Narrative:    EXAMINATION:  AP PORTABLE CHEST    CLINICAL HISTORY:  ams;    TECHNIQUE:  AP portable chest radiograph was submitted.    COMPARISON:  01/27/2023    FINDINGS:  AP portable chest radiograph demonstrates a cardiac silhouette within normal limits.  There is no focal consolidation, pneumothorax, or pleural effusion. The bones are osteopenic.                                       Medications   sodium bicarbonate 100 mEq in sterile water 1,000 mL infusion ( Intravenous New Bag 2/2/23 8777)   sodium chloride 0.9% flush 10 mL (has no administration in time range)   0.9%  NaCl infusion (125 mL/hr Intravenous New Bag 2/2/23 4686)   dextrose 5 % and 0.45 % NaCl infusion (has no administration in time range)   ondansetron  injection 4 mg (has no administration in time range)   acetaminophen tablet 650 mg (has no administration in time range)   dextrose 10% bolus 125 mL 125 mL (has no administration in time range)   dextrose 10% bolus 250 mL 250 mL (has no administration in time range)   heparin (porcine) injection 5,000 Units (5,000 Units Subcutaneous Given 2/2/23 2305)   insulin regular in 0.9 % NaCl 100 unit/100 mL (1 unit/mL) infusion (3 Units/hr Intravenous Rate/Dose Change 2/2/23 2218)   melatonin tablet 6 mg (has no administration in time range)   prochlorperazine injection Soln 5 mg (has no administration in time range)   polyethylene glycol packet 17 g (has no administration in time range)   senna-docusate 8.6-50 mg per tablet 1 tablet (has no administration in time range)   aspirin chewable tablet 81 mg (has no administration in time range)   atorvastatin tablet 20 mg (has no administration in time range)   cetirizine tablet 10 mg (has no administration in time range)   gabapentin capsule 400 mg (has no administration in time range)   lactated ringers bolus 1,000 mL (0 mLs Intravenous Stopped 2/2/23 2007)   calcium gluconate 1 g in NS IVPB (premixed) (0 g Intravenous Stopped 2/2/23 2004)   lactated ringers bolus 1,000 mL (0 mLs Intravenous Stopped 2/2/23 2117)   sodium bicarbonate solution 50 mEq (50 mEq Intravenous Given 2/2/23 1925)   insulin regular injection 10 Units 0.1 mL (10 Units Intravenous Given 2/2/23 1923)     Medical Decision Making:   History:   Old Medical Records: I decided to obtain old medical records.  Old Records Summarized: other records and records from previous admission(s).       <> Summary of Records: External documents reviewed:   1/27/23: Admitted with DKA [calculated anion gap 36, bicarb 8, glucose 737, beta hydroxybutyrate 6.6, and pH 7.07]. Lactic acid 6 with a leukocytosis of 18 K. Placed in ICU on IV insulin and DKA protocol.  Patient aggressive and physical was staff, has done this in the past  physical restraints ordered.  Will give him low-dose Depakote Q 12 in hopes of removing physical restraints.     On 1/29, the patient became more agitated and requested to be discharged.  Independently Interpreted Test(s):   I have ordered and independently interpreted EKG Reading(s) - see summary below       <> Summary of EKG Reading(s): EKG independently interpreted by Fabiola Stringer MD reads: see ED course.   Clinical Tests:   Lab Tests: Ordered and Reviewed  Radiological Study: Ordered and Reviewed  Medical Tests: Ordered and Reviewed     MDM  57 year old presenting 2/2 DKA. Likely cause is 2/2 noncompliance. Ordered BMP, LFTS, CBC, UA, ABG, BHB.  Labs showed CO2 6, BG 700s, AG 29. K 6.3 with peaked t waves. Crea 1.9. increased for his previous,  .  Patient was also hydrated with 2L liters of LRS before insulin drip started on the patient. Patient started on an insulin drip per our DKA protocol and observed for signs of hypoglycemia. Spoke with Dr. Mendieta regarding labs, interventions, and any consults and admitted the patient to their service.     57-year-old male past medical history of hypertension, hepatitis-C, DKA, substance use disorder, psychiatric  violent behavior presents in DKA.  Patient is agitated and aggressive on exam not answering most questions and yelling at staff you ask him any questions.  Diffuse abdominal tenderness.  Nausea/vomiting.  He was tachycardic.  His pH is 7.0.  His CO2 is 6.  His sugar was 700s.  His potassium was 6.2 with peaked T-waves on his EKG.  He was given calcium, insulin, albuterol, bicarb.  CT head for aggressive behavior and CT abdomen and pelvis without any acute findings.  Creatinine slightly elevated to 1.9.  Per EMS patient family lives with him and they are unsure when the last time he took his insulin was.  He comes in frequently for DKA.       Scribe Attestation:   Scribe #1: I performed the above scribed service and the documentation accurately describes the  services I performed. I attest to the accuracy of the note.      ED Course as of 02/02/23 2318   Thu Feb 02, 2023 2227 EKG 12-lead  Sinus tachycardia 113.  No ST elevation or significant depression.  T-wave inversions in V2 and 3.  Peaked T-waves.  Right atrial enlargement.  QTC is 466.  These T-wave is actually look fairly similar to his previous EKG in  January 27, 2023.  [JT]      ED Course User Index  [JT] Fabiola Stringer MD                 Clinical Impression:   Final diagnoses:  [R11.10] Vomiting  [E11.10] DKA (diabetic ketoacidosis)  [E86.0] Dehydration (Primary)  [N17.9] RAMYA (acute kidney injury)  [R45.6] Violent behavior        ED Disposition Condition    Admit Stable        I, Fabiola Stringer, personally performed the services described in this documentation. All medical record entries made by the scribe were at my direction and in my presence. I have reviewed the chart and agree that the record reflects my personal performance and is accurate and complete.          Fabiola Stringer MD  02/02/23 9421

## 2023-02-03 PROBLEM — E10.11 DIABETIC KETOACIDOSIS WITH COMA ASSOCIATED WITH TYPE 1 DIABETES MELLITUS: Status: ACTIVE | Noted: 2021-11-24

## 2023-02-03 LAB
ANION GAP SERPL CALC-SCNC: 10 MMOL/L (ref 8–16)
ANION GAP SERPL CALC-SCNC: 12 MMOL/L (ref 8–16)
ANION GAP SERPL CALC-SCNC: 14 MMOL/L (ref 8–16)
ANION GAP SERPL CALC-SCNC: 16 MMOL/L (ref 8–16)
BASOPHILS # BLD AUTO: 0.02 K/UL (ref 0–0.2)
BASOPHILS NFR BLD: 0.2 % (ref 0–1.9)
BUN SERPL-MCNC: 16 MG/DL (ref 6–20)
BUN SERPL-MCNC: 19 MG/DL (ref 6–20)
BUN SERPL-MCNC: 21 MG/DL (ref 6–20)
BUN SERPL-MCNC: 23 MG/DL (ref 6–20)
CALCIUM SERPL-MCNC: 8.1 MG/DL (ref 8.7–10.5)
CALCIUM SERPL-MCNC: 8.1 MG/DL (ref 8.7–10.5)
CALCIUM SERPL-MCNC: 8.4 MG/DL (ref 8.7–10.5)
CALCIUM SERPL-MCNC: 8.4 MG/DL (ref 8.7–10.5)
CHLORIDE SERPL-SCNC: 104 MMOL/L (ref 95–110)
CHLORIDE SERPL-SCNC: 105 MMOL/L (ref 95–110)
CHLORIDE SERPL-SCNC: 106 MMOL/L (ref 95–110)
CHLORIDE SERPL-SCNC: 106 MMOL/L (ref 95–110)
CO2 SERPL-SCNC: 16 MMOL/L (ref 23–29)
CO2 SERPL-SCNC: 18 MMOL/L (ref 23–29)
CO2 SERPL-SCNC: 20 MMOL/L (ref 23–29)
CO2 SERPL-SCNC: 22 MMOL/L (ref 23–29)
CREAT SERPL-MCNC: 1.2 MG/DL (ref 0.5–1.4)
CREAT SERPL-MCNC: 1.4 MG/DL (ref 0.5–1.4)
CREAT SERPL-MCNC: 1.5 MG/DL (ref 0.5–1.4)
CREAT SERPL-MCNC: 1.6 MG/DL (ref 0.5–1.4)
DIFFERENTIAL METHOD: ABNORMAL
EOSINOPHIL # BLD AUTO: 0.1 K/UL (ref 0–0.5)
EOSINOPHIL NFR BLD: 1.4 % (ref 0–8)
ERYTHROCYTE [DISTWIDTH] IN BLOOD BY AUTOMATED COUNT: 13.4 % (ref 11.5–14.5)
EST. GFR  (NO RACE VARIABLE): 50 ML/MIN/1.73 M^2
EST. GFR  (NO RACE VARIABLE): 54 ML/MIN/1.73 M^2
EST. GFR  (NO RACE VARIABLE): 59 ML/MIN/1.73 M^2
EST. GFR  (NO RACE VARIABLE): >60 ML/MIN/1.73 M^2
GLUCOSE SERPL-MCNC: 168 MG/DL (ref 70–110)
GLUCOSE SERPL-MCNC: 248 MG/DL (ref 70–110)
GLUCOSE SERPL-MCNC: 283 MG/DL (ref 70–110)
GLUCOSE SERPL-MCNC: 386 MG/DL (ref 70–110)
HCT VFR BLD AUTO: 33.4 % (ref 40–54)
HGB BLD-MCNC: 11.2 G/DL (ref 14–18)
IMM GRANULOCYTES # BLD AUTO: 0.09 K/UL (ref 0–0.04)
IMM GRANULOCYTES NFR BLD AUTO: 0.9 % (ref 0–0.5)
LYMPHOCYTES # BLD AUTO: 1.6 K/UL (ref 1–4.8)
LYMPHOCYTES NFR BLD: 15.9 % (ref 18–48)
MAGNESIUM SERPL-MCNC: 1.8 MG/DL (ref 1.6–2.6)
MCH RBC QN AUTO: 26 PG (ref 27–31)
MCHC RBC AUTO-ENTMCNC: 33.5 G/DL (ref 32–36)
MCV RBC AUTO: 78 FL (ref 82–98)
MONOCYTES # BLD AUTO: 1.3 K/UL (ref 0.3–1)
MONOCYTES NFR BLD: 13.3 % (ref 4–15)
NEUTROPHILS # BLD AUTO: 6.8 K/UL (ref 1.8–7.7)
NEUTROPHILS NFR BLD: 68.3 % (ref 38–73)
NRBC BLD-RTO: 0 /100 WBC
PHOSPHATE SERPL-MCNC: 2.9 MG/DL (ref 2.7–4.5)
PLATELET # BLD AUTO: 343 K/UL (ref 150–450)
PMV BLD AUTO: 8.8 FL (ref 9.2–12.9)
POCT GLUCOSE: 112 MG/DL (ref 70–110)
POCT GLUCOSE: 158 MG/DL (ref 70–110)
POCT GLUCOSE: 202 MG/DL (ref 70–110)
POCT GLUCOSE: 226 MG/DL (ref 70–110)
POCT GLUCOSE: 230 MG/DL (ref 70–110)
POCT GLUCOSE: 230 MG/DL (ref 70–110)
POCT GLUCOSE: 233 MG/DL (ref 70–110)
POCT GLUCOSE: 237 MG/DL (ref 70–110)
POCT GLUCOSE: 258 MG/DL (ref 70–110)
POCT GLUCOSE: 260 MG/DL (ref 70–110)
POCT GLUCOSE: 269 MG/DL (ref 70–110)
POCT GLUCOSE: 274 MG/DL (ref 70–110)
POCT GLUCOSE: 287 MG/DL (ref 70–110)
POCT GLUCOSE: 29 MG/DL (ref 70–110)
POCT GLUCOSE: 305 MG/DL (ref 70–110)
POCT GLUCOSE: 348 MG/DL (ref 70–110)
POCT GLUCOSE: 47 MG/DL (ref 70–110)
POTASSIUM SERPL-SCNC: 4.2 MMOL/L (ref 3.5–5.1)
POTASSIUM SERPL-SCNC: 4.3 MMOL/L (ref 3.5–5.1)
POTASSIUM SERPL-SCNC: 4.5 MMOL/L (ref 3.5–5.1)
POTASSIUM SERPL-SCNC: 5.3 MMOL/L (ref 3.5–5.1)
RBC # BLD AUTO: 4.3 M/UL (ref 4.6–6.2)
SODIUM SERPL-SCNC: 136 MMOL/L (ref 136–145)
SODIUM SERPL-SCNC: 137 MMOL/L (ref 136–145)
SODIUM SERPL-SCNC: 138 MMOL/L (ref 136–145)
SODIUM SERPL-SCNC: 138 MMOL/L (ref 136–145)
WBC # BLD AUTO: 9.96 K/UL (ref 3.9–12.7)

## 2023-02-03 PROCEDURE — 25000003 PHARM REV CODE 250: Performed by: INTERNAL MEDICINE

## 2023-02-03 PROCEDURE — 83735 ASSAY OF MAGNESIUM: CPT | Performed by: INTERNAL MEDICINE

## 2023-02-03 PROCEDURE — 63600175 PHARM REV CODE 636 W HCPCS: Performed by: INTERNAL MEDICINE

## 2023-02-03 PROCEDURE — 25000003 PHARM REV CODE 250: Performed by: STUDENT IN AN ORGANIZED HEALTH CARE EDUCATION/TRAINING PROGRAM

## 2023-02-03 PROCEDURE — 85025 COMPLETE CBC W/AUTO DIFF WBC: CPT | Performed by: STUDENT IN AN ORGANIZED HEALTH CARE EDUCATION/TRAINING PROGRAM

## 2023-02-03 PROCEDURE — 90792 PSYCH DIAG EVAL W/MED SRVCS: CPT | Mod: ,,, | Performed by: PSYCHIATRY & NEUROLOGY

## 2023-02-03 PROCEDURE — 90792 PR PSYCHIATRIC DIAGNOSTIC EVALUATION W/MEDICAL SERVICES: ICD-10-PCS | Mod: ,,, | Performed by: PSYCHIATRY & NEUROLOGY

## 2023-02-03 PROCEDURE — 80048 BASIC METABOLIC PNL TOTAL CA: CPT | Performed by: INTERNAL MEDICINE

## 2023-02-03 PROCEDURE — 63600175 PHARM REV CODE 636 W HCPCS: Performed by: STUDENT IN AN ORGANIZED HEALTH CARE EDUCATION/TRAINING PROGRAM

## 2023-02-03 PROCEDURE — 11000001 HC ACUTE MED/SURG PRIVATE ROOM

## 2023-02-03 PROCEDURE — S5010 5% DEXTROSE AND 0.45% SALINE: HCPCS | Performed by: INTERNAL MEDICINE

## 2023-02-03 PROCEDURE — 84100 ASSAY OF PHOSPHORUS: CPT | Performed by: INTERNAL MEDICINE

## 2023-02-03 PROCEDURE — S0166 INJ OLANZAPINE 2.5MG: HCPCS | Performed by: INTERNAL MEDICINE

## 2023-02-03 RX ORDER — OLANZAPINE 2.5 MG/1
10 TABLET ORAL 2 TIMES DAILY
Status: DISCONTINUED | OUTPATIENT
Start: 2023-02-03 | End: 2023-02-04 | Stop reason: HOSPADM

## 2023-02-03 RX ORDER — OLANZAPINE 10 MG/2ML
10 INJECTION, POWDER, FOR SOLUTION INTRAMUSCULAR ONCE AS NEEDED
Status: DISCONTINUED | OUTPATIENT
Start: 2023-02-03 | End: 2023-02-04 | Stop reason: HOSPADM

## 2023-02-03 RX ORDER — IBUPROFEN 200 MG
16 TABLET ORAL
Status: DISCONTINUED | OUTPATIENT
Start: 2023-02-03 | End: 2023-02-04

## 2023-02-03 RX ORDER — OLANZAPINE 10 MG/2ML
5 INJECTION, POWDER, FOR SOLUTION INTRAMUSCULAR ONCE AS NEEDED
Status: COMPLETED | OUTPATIENT
Start: 2023-02-03 | End: 2023-02-03

## 2023-02-03 RX ORDER — INSULIN ASPART 100 [IU]/ML
0-5 INJECTION, SOLUTION INTRAVENOUS; SUBCUTANEOUS
Status: DISCONTINUED | OUTPATIENT
Start: 2023-02-03 | End: 2023-02-04

## 2023-02-03 RX ORDER — DIVALPROEX SODIUM 125 MG/1
500 CAPSULE, COATED PELLETS ORAL EVERY 8 HOURS
Status: DISCONTINUED | OUTPATIENT
Start: 2023-02-03 | End: 2023-02-04 | Stop reason: HOSPADM

## 2023-02-03 RX ORDER — IBUPROFEN 200 MG
24 TABLET ORAL
Status: DISCONTINUED | OUTPATIENT
Start: 2023-02-03 | End: 2023-02-04

## 2023-02-03 RX ORDER — MUPIROCIN 20 MG/G
OINTMENT TOPICAL 2 TIMES DAILY
Status: DISCONTINUED | OUTPATIENT
Start: 2023-02-03 | End: 2023-02-04 | Stop reason: HOSPADM

## 2023-02-03 RX ORDER — GABAPENTIN 400 MG/1
400 CAPSULE ORAL 3 TIMES DAILY
Status: DISCONTINUED | OUTPATIENT
Start: 2023-02-03 | End: 2023-02-04 | Stop reason: HOSPADM

## 2023-02-03 RX ORDER — IPRATROPIUM BROMIDE AND ALBUTEROL SULFATE 2.5; .5 MG/3ML; MG/3ML
3 SOLUTION RESPIRATORY (INHALATION) EVERY 4 HOURS PRN
Status: DISCONTINUED | OUTPATIENT
Start: 2023-02-03 | End: 2023-02-04 | Stop reason: HOSPADM

## 2023-02-03 RX ORDER — INSULIN ASPART 100 [IU]/ML
5 INJECTION, SOLUTION INTRAVENOUS; SUBCUTANEOUS
Status: DISCONTINUED | OUTPATIENT
Start: 2023-02-04 | End: 2023-02-04 | Stop reason: HOSPADM

## 2023-02-03 RX ORDER — GLUCAGON 1 MG
1 KIT INJECTION
Status: DISCONTINUED | OUTPATIENT
Start: 2023-02-03 | End: 2023-02-04

## 2023-02-03 RX ORDER — OLANZAPINE 10 MG/2ML
5 INJECTION, POWDER, FOR SOLUTION INTRAMUSCULAR EVERY 8 HOURS PRN
Status: DISCONTINUED | OUTPATIENT
Start: 2023-02-03 | End: 2023-02-04 | Stop reason: HOSPADM

## 2023-02-03 RX ADMIN — CETIRIZINE HYDROCHLORIDE 10 MG: 10 TABLET, FILM COATED ORAL at 09:02

## 2023-02-03 RX ADMIN — GABAPENTIN 400 MG: 400 CAPSULE ORAL at 12:02

## 2023-02-03 RX ADMIN — ASPIRIN 81 MG CHEWABLE TABLET 81 MG: 81 TABLET CHEWABLE at 09:02

## 2023-02-03 RX ADMIN — HEPARIN SODIUM 5000 UNITS: 5000 INJECTION INTRAVENOUS; SUBCUTANEOUS at 08:02

## 2023-02-03 RX ADMIN — Medication 24 G: at 06:02

## 2023-02-03 RX ADMIN — DEXTROSE AND SODIUM CHLORIDE 125 ML/HR: 5; 450 INJECTION, SOLUTION INTRAVENOUS at 01:02

## 2023-02-03 RX ADMIN — OLANZAPINE 10 MG: 2.5 TABLET, FILM COATED ORAL at 11:02

## 2023-02-03 RX ADMIN — OLANZAPINE 10 MG: 2.5 TABLET, FILM COATED ORAL at 08:02

## 2023-02-03 RX ADMIN — DIVALPROEX SODIUM 500 MG: 125 CAPSULE, COATED PELLETS ORAL at 03:02

## 2023-02-03 RX ADMIN — ATORVASTATIN CALCIUM 20 MG: 10 TABLET, FILM COATED ORAL at 09:02

## 2023-02-03 RX ADMIN — HEPARIN SODIUM 5000 UNITS: 5000 INJECTION INTRAVENOUS; SUBCUTANEOUS at 07:02

## 2023-02-03 RX ADMIN — HEPARIN SODIUM 5000 UNITS: 5000 INJECTION INTRAVENOUS; SUBCUTANEOUS at 04:02

## 2023-02-03 RX ADMIN — MUPIROCIN: 20 OINTMENT TOPICAL at 12:02

## 2023-02-03 RX ADMIN — DEXTROSE MONOHYDRATE 250 ML: 100 INJECTION, SOLUTION INTRAVENOUS at 06:02

## 2023-02-03 RX ADMIN — INSULIN DETEMIR 20 UNITS: 100 INJECTION, SOLUTION SUBCUTANEOUS at 12:02

## 2023-02-03 RX ADMIN — DIVALPROEX SODIUM 500 MG: 125 CAPSULE, COATED PELLETS ORAL at 08:02

## 2023-02-03 RX ADMIN — OLANZAPINE 5 MG: 10 INJECTION, POWDER, FOR SOLUTION INTRAMUSCULAR at 05:02

## 2023-02-03 RX ADMIN — GABAPENTIN 400 MG: 400 CAPSULE ORAL at 08:02

## 2023-02-03 RX ADMIN — MUPIROCIN: 20 OINTMENT TOPICAL at 08:02

## 2023-02-03 RX ADMIN — POLYETHYLENE GLYCOL 3350 17 G: 17 POWDER, FOR SOLUTION ORAL at 09:02

## 2023-02-03 NOTE — H&P
"[facility] Medicine  History & Physical    Patient Name: Cali Mabry  MRN: [id number]  Patient Class: IP- Inpatient  Admission Date: 2/2/2023  Attending Physician: Darren Gorman MD   Primary Care Provider: Primary Doctor No         Patient information was obtained from EMS personnel, past medical records and ER records.     Subjective:     Principal Problem:<principal problem not specified>    Chief Complaint:   Chief Complaint   Patient presents with    Emesis     Presents to the ED via EMS with c/o abdominal pain, N/V x 1 day. Hx of DM. CBG via EMS read HIGH.         HPI:   Mr. Mabry is a 56yo man with a past medical history of HTN, IV heroin abuse, DM1 with DKA, Hep C, pancreatitis, and COPD due to long term cigarette smoking.  He is known to me from admitting him multiple prior times here at  for DKA and COPD exacerbations.    I examined Mr. Mabry in the ED.  He is lethargic, will not open his eyes, is not verbal, and will only groan to sternal rub.  I am unable to gather any history from him.  The triage nurse was able to gather a little information from him on arrival noting, "Pt states that his upper abdomen is hurting 9/10 pain. Patient has associated symptoms of Nausea/vomiting. Pt is sleepy but wakes up with verbal stimulus. Pt. Gets verbally abusive when asked a lot of questions."    In the ED his VS were BP (!) 172/79   Pulse (!) 121   Temp 97.8 °F (36.6 °C) (Oral)   Resp 17   Ht 5' 8" (1.727 m)   Wt 55.8 kg (123 lb)   SpO2 100% RA  BMI 18.70 kg/m².  Labs showed normal CBC, Cr 1.9, K 6.3, gluc 786, AG 29, BHB 6.8, VBG: pH 7.084, PCO2 37.5.      CXR showed no acute intrathoracic abnormality detected.  CT head showed no acute findings.  Chronic changes with ventricular enlargement, as above.  Please correlate for normal pressure hydrocephalus.  No adverse change from 08/10/2022.  CT abdomen and pelvis showed no acute findings.  Moderately large colonic " stool.  Question constipation.  Small fluid in the distal esophagus.  Findings may suggest esophageal dysmotility issue or reflux.  Questionable scarring at the right lung base.    In the ED he was treated with:  Medications   albuterol-ipratropium 2.5 mg-0.5 mg/3 mL nebulizer solution 3 mL (3 mLs Nebulization Not Given 2/2/23 1925)   dextrose 50% injection 25 g (has no administration in time range)   dextrose 50% injection 12.5 g (has no administration in time range)   insulin regular in 0.9 % NaCl 100 unit/100 mL (1 unit/mL) infusion (3.5 Units/hr Intravenous Rate/Dose Change 2/2/23 2048)   lactated ringers bolus 1,000 mL (0 mLs Intravenous Stopped 2/2/23 2007)   calcium gluconate 1 g in NS IVPB (premixed) (0 g Intravenous Stopped 2/2/23 2004)   lactated ringers bolus 1,000 mL (0 mLs Intravenous Stopped 2/2/23 2117)   sodium bicarbonate solution 50 mEq (50 mEq Intravenous Given 2/2/23 1925)   insulin regular injection 10 Units 0.1 mL (10 Units Intravenous Given 2/2/23 1923)           Past Medical History:   Diagnosis Date    Acute on chronic pancreatitis 04/03/2016    Addiction to drug     CHF (congestive heart failure)     DKA (diabetic ketoacidoses) 02/2020    DM (diabetes mellitus), type 1     HCV (hepatitis C virus)     high VL     HTN (hypertension)     Hx of psychiatric care     Hypomagnesemia 05/25/2020    IVDU (intravenous drug user)     Heroin    Pancreatitis     Psychiatric problem     Substance abuse        Past Surgical History:   Procedure Laterality Date    CARDIAC SURGERY  1999    stent placed. ([facility])    ESOPHAGOGASTRODUODENOSCOPY N/A 3/5/2020    Gastritis.  No H pylori.  Completed PPI for 1 month.  Procedure: EGD (ESOPHAGOGASTRODUODENOSCOPY);  Surgeon: Brinda Rene MD;  [facility];  Service: Endoscopy;  Laterality: N/A;  [id number]; [phone]    LEFT HEART CATHETERIZATION Left 9/28/2020    Procedure: Left heart cath;  Surgeon: Fito Rangel MD;  Location: [facility]  CATH LAB;  Service: Cardiology;  Laterality: Left;    SHOULDER SURGERY  2013    right shoulder, spur removal.       Review of patient's allergies indicates:  No Known Allergies    No current facility-administered medications on file prior to encounter.     Current Outpatient Medications on File Prior to Encounter   Medication Sig    aspirin 81 MG Chew Take 1 tablet (81 mg total) by mouth once daily.    atorvastatin (LIPITOR) 20 MG tablet Take 1 tablet (20 mg total) by mouth once daily. (Patient not taking: Reported on 10/18/2022)    cetirizine (ZYRTEC) 10 MG tablet Take 1 tablet (10 mg total) by mouth once daily.    dextromethorphan 15 mg/5 mL syrup Take 5 mLs (15 mg total) by mouth every 4 to 6 hours as needed for Cough.    gabapentin (NEURONTIN) 400 MG capsule Take 400 mg by mouth 3 (three) times daily.    insulin detemir U-100 (LEVEMIR FLEXTOUCH) 100 unit/mL (3 mL) SubQ InPn pen Inject 40 Units into the skin every evening.    insulin lispro 100 unit/mL pen Inject 7 Units into the skin 3 (three) times daily with meals.    naloxone (NARCAN) 4 mg/actuation Spry 4mg by nasal route as needed for opioid overdose; may repeat every 2-3 minutes in alternating nostrils until medical help arrives. Call 911 (Patient not taking: Reported on 10/18/2022)    nitroGLYCERIN (NITROSTAT) 0.4 MG SL tablet Place 1 tablet (0.4 mg total) under the tongue every 5 (five) minutes as needed for Chest pain. (Patient not taking: Reported on 9/16/2022)    TRESIBA FLEXTOUCH U-200 200 unit/mL (3 mL) insulin pen Inject 30 Units into the skin Daily.    [DISCONTINUED] insulin aspart U-100 (NOVOLOG) 100 unit/mL injection Inject 1-10 Units into the skin 3 (three) times daily with meals.     Family History       Problem Relation (Age of Onset)    Asthma Mother          Tobacco Use    Smoking status: Every Day     Packs/day: 1.00     Years: 20.00     Pack years: 20.00     Types: Cigarettes     Start date: 4/3/1981     Last attempt to quit:  2020     Years since quittin.6    Smokeless tobacco: Never    Tobacco comments:     states approx 1 per day.   Substance and Sexual Activity    Alcohol use: Not Currently     Alcohol/week: 0.0 standard drinks    Drug use: Yes     Types: Heroin, IV, Marijuana, Amphetamines    Sexual activity: Yes     Partners: Female     Review of Systems  Objective:     Vital Signs (Most Recent):  Temp: 97.8 °F (36.6 °C) (23 1634)  Pulse: (!) 112 (23 0002)  Resp: 20 (23 0002)  BP: 130/61 (23 0002)  SpO2: 96 % (23)   Vital Signs (24h Range):  Temp:  [97.8 °F (36.6 °C)] 97.8 °F (36.6 °C)  Pulse:  [111-121] 112  Resp:  [17-26] 20  SpO2:  [96 %-100 %] 96 %  BP: (127-172)/(59-79) 130/61     Weight: 55.8 kg (123 lb)  Body mass index is 18.7 kg/m².    Physical Exam  Vitals and nursing note reviewed.   Constitutional:       General: He is sleeping. He is in acute distress.      Appearance: He is well-developed and underweight. He is ill-appearing, toxic-appearing and diaphoretic.      Comments: Bilateral temporal wasting   HENT:      Head: Normocephalic and atraumatic.      Nose: Nose normal.      Mouth/Throat:      Pharynx: No oropharyngeal exudate.   Eyes:      General: No scleral icterus.        Right eye: No discharge.         Left eye: No discharge.      Extraocular Movements: EOM normal.      Conjunctiva/sclera:      Right eye: Right conjunctiva is injected.      Left eye: Left conjunctiva is injected.      Pupils: Pupils are equal, round, and reactive to light.   Neck:      Thyroid: No thyromegaly.      Vascular: No JVD.      Trachea: No tracheal deviation.   Cardiovascular:      Rate and Rhythm: Regular rhythm. Tachycardia present.      Heart sounds: Normal heart sounds. No murmur heard.    No friction rub. No gallop.   Pulmonary:      Effort: Pulmonary effort is normal. Tachypnea present. No respiratory distress.      Breath sounds: No stridor. Rhonchi present. No decreased breath  sounds, wheezing or rales.   Chest:      Chest wall: No swelling or tenderness.   Abdominal:      General: Abdomen is protuberant. Bowel sounds are normal. There is no distension.      Palpations: Abdomen is soft. There is no mass.      Comments: He does not awaken or grimace on palpation of his abdomen   Genitourinary:     Comments: No pena in place  Musculoskeletal:         General: No tenderness. Normal range of motion.      Cervical back: Normal range of motion and neck supple.   Lymphadenopathy:      Cervical: No cervical adenopathy.      Comments: Trace edema to legs   Skin:     General: Skin is cool and moist.      Coloration: Skin is sallow. Skin is not pale.      Findings: No erythema or rash.   Neurological:      Mental Status: He is lethargic and disoriented.      GCS: GCS eye subscore is 2. GCS verbal subscore is 1. GCS motor subscore is 5.      Cranial Nerves: No cranial nerve deficit.   Psychiatric:         Attention and Perception: He is inattentive.         Mood and Affect: Mood is depressed.         Speech: He is noncommunicative. Speech is delayed.         Behavior: Behavior is uncooperative, slowed and withdrawn.         Cognition and Memory: Cognition is impaired. Memory is impaired. He exhibits impaired recent memory and impaired remote memory.         CRANIAL NERVES     CN III, IV, VI   Pupils are equal, round, and reactive to light.  Extraocular motions are normal.      Significant Labs: All pertinent labs within the past 24 hours have been reviewed.  Recent Results (from the past 24 hour(s))   POCT glucose    Collection Time: 02/02/23  5:05 PM   Result Value Ref Range    POCT Glucose >500 (H) 70 - 110 mg/dL   ISTAT PROCEDURE    Collection Time: 02/02/23  6:11 PM   Result Value Ref Range    POC PH 7.084 (L) 7.35 - 7.45    POC PCO2 37.5 35 - 45 mmHg    POC PO2 33 (L) 40 - 60 mmHg    POC HCO3 11.2 (L) 24 - 28 mmol/L    POC BE -18 -2 to 2 mmol/L    POC SATURATED O2 43 (L) 95 - 100 %    POC TCO2  12 (L) 24 - 29 mmol/L    Sample VENOUS     Site Other     Allens Test N/A    CBC auto differential    Collection Time: 02/02/23  6:14 PM   Result Value Ref Range    WBC 12.43 3.90 - 12.70 K/uL    RBC 5.73 4.60 - 6.20 M/uL    Hemoglobin 14.6 14.0 - 18.0 g/dL    Hematocrit 49.0 40.0 - 54.0 %    MCV 86 82 - 98 fL    MCH 25.5 (L) 27.0 - 31.0 pg    MCHC 29.8 (L) 32.0 - 36.0 g/dL    RDW 13.6 11.5 - 14.5 %    Platelets 454 (H) 150 - 450 K/uL    MPV 9.9 9.2 - 12.9 fL    Immature Granulocytes 0.8 (H) 0.0 - 0.5 %    Gran # (ANC) 9.9 (H) 1.8 - 7.7 K/uL    Immature Grans (Abs) 0.10 (H) 0.00 - 0.04 K/uL    Lymph # 1.2 1.0 - 4.8 K/uL    Mono # 1.1 (H) 0.3 - 1.0 K/uL    Eos # 0.1 0.0 - 0.5 K/uL    Baso # 0.05 0.00 - 0.20 K/uL    nRBC 0 0 /100 WBC    Gran % 79.8 (H) 38.0 - 73.0 %    Lymph % 9.5 (L) 18.0 - 48.0 %    Mono % 8.9 4.0 - 15.0 %    Eosinophil % 0.6 0.0 - 8.0 %    Basophil % 0.4 0.0 - 1.9 %    Differential Method Automated    Comprehensive metabolic panel    Collection Time: 02/02/23  6:14 PM   Result Value Ref Range    Sodium 132 (L) 136 - 145 mmol/L    Potassium 6.3 (HH) 3.5 - 5.1 mmol/L    Chloride 97 95 - 110 mmol/L    CO2 6 (LL) 23 - 29 mmol/L    Glucose 786 (HH) 70 - 110 mg/dL    BUN 28 (H) 6 - 20 mg/dL    Creatinine 1.9 (H) 0.5 - 1.4 mg/dL    Calcium 9.1 8.7 - 10.5 mg/dL    Total Protein 8.8 (H) 6.0 - 8.4 g/dL    Albumin 4.0 3.5 - 5.2 g/dL    Total Bilirubin 0.4 0.1 - 1.0 mg/dL    Alkaline Phosphatase 133 55 - 135 U/L    AST 22 10 - 40 U/L    ALT 20 10 - 44 U/L    Anion Gap 29 (H) 8 - 16 mmol/L    eGFR 41 (A) >60 mL/min/1.73 m^2   Lactic acid, plasma    Collection Time: 02/02/23  6:14 PM   Result Value Ref Range    Lactate (Lactic Acid) 2.2 0.5 - 2.2 mmol/L   Lipase    Collection Time: 02/02/23  6:14 PM   Result Value Ref Range    Lipase 45 4 - 60 U/L   Magnesium    Collection Time: 02/02/23  6:14 PM   Result Value Ref Range    Magnesium 2.3 1.6 - 2.6 mg/dL   Troponin I    Collection Time: 02/02/23  6:14 PM   Result  Value Ref Range    Troponin I 0.009 0.000 - 0.026 ng/mL   TSH    Collection Time: 02/02/23  6:14 PM   Result Value Ref Range    TSH 0.294 (L) 0.400 - 4.000 uIU/mL   Beta - Hydroxybutyrate, Serum    Collection Time: 02/02/23  6:14 PM   Result Value Ref Range    Beta-Hydroxybutyrate 6.8 (H) 0.0 - 0.5 mmol/L   Ethanol    Collection Time: 02/02/23  6:14 PM   Result Value Ref Range    Alcohol, Serum <10 <10 mg/dL   T4, Free    Collection Time: 02/02/23  6:14 PM   Result Value Ref Range    Free T4 1.13 0.71 - 1.51 ng/dL   POCT glucose    Collection Time: 02/02/23  7:21 PM   Result Value Ref Range    POCT Glucose >500 (H) 70 - 110 mg/dL   POCT glucose    Collection Time: 02/02/23  8:44 PM   Result Value Ref Range    POCT Glucose >500 (H) 70 - 110 mg/dL   POCT glucose    Collection Time: 02/02/23  9:51 PM   Result Value Ref Range    POCT Glucose 450 (HH) 70 - 110 mg/dL   Basic metabolic panel    Collection Time: 02/02/23 10:21 PM   Result Value Ref Range    Sodium 139 136 - 145 mmol/L    Potassium 5.5 (H) 3.5 - 5.1 mmol/L    Chloride 109 95 - 110 mmol/L    CO2 9 (LL) 23 - 29 mmol/L    Glucose 408 (H) 70 - 110 mg/dL    BUN 24 (H) 6 - 20 mg/dL    Creatinine 1.3 0.5 - 1.4 mg/dL    Calcium 7.6 (L) 8.7 - 10.5 mg/dL    Anion Gap 21 (H) 8 - 16 mmol/L    eGFR >60 >60 mL/min/1.73 m^2   POCT glucose    Collection Time: 02/02/23 10:53 PM   Result Value Ref Range    POCT Glucose 388 (H) 70 - 110 mg/dL   Urinalysis, Reflex to Urine Culture Urine, Clean Catch    Collection Time: 02/02/23 11:11 PM    Specimen: Urine   Result Value Ref Range    Specimen UA Urine, Clean Catch     Color, UA Colorless (A) Yellow, Straw, Monet    Appearance, UA Clear Clear    pH, UA 5.0 5.0 - 8.0    Specific Gravity, UA 1.015 1.005 - 1.030    Protein, UA Trace (A) Negative    Glucose, UA 4+ (A) Negative    Ketones, UA 3+ (A) Negative    Bilirubin (UA) Negative Negative    Occult Blood UA Negative Negative    Nitrite, UA Negative Negative    Urobilinogen, UA  Negative <2.0 EU/dL    Leukocytes, UA Negative Negative   Urinalysis Microscopic    Collection Time: 02/02/23 11:11 PM   Result Value Ref Range    Bacteria Rare None-Occ /hpf    Yeast, UA None None    Squam Epithel, UA 4 /hpf    Microscopic Comment SEE COMMENT    Drug screen panel, emergency    Collection Time: 02/02/23 11:27 PM   Result Value Ref Range    Benzodiazepines Negative Negative    Methadone metabolites Negative Negative    Cocaine (Metab.) Negative Negative    Opiate Scrn, Ur Negative Negative    Barbiturate Screen, Ur Negative Negative    Amphetamine Screen, Ur Presumptive Positive (A) Negative    THC Negative Negative    Phencyclidine Negative Negative    Creatinine, Urine 17.4 (L) 23.0 - 375.0 mg/dL    Toxicology Information SEE COMMENT    ISTAT PROCEDURE    Collection Time: 02/02/23 11:45 PM   Result Value Ref Range    POC PH 7.495 (H) 7.35 - 7.45    POC PCO2 43.5 35 - 45 mmHg    POC PO2 71 (HH) 40 - 60 mmHg    POC HCO3 33.5 (H) 24 - 28 mmol/L    POC BE 9 -2 to 2 mmol/L    POC SATURATED O2 95 95 - 100 %    POC TCO2 35 (H) 24 - 29 mmol/L    Sample VENOUS     Site Other     Allens Test N/A          Significant Imaging: I have reviewed all pertinent imaging results/findings within the past 24 hours.    Assessment/Plan:     Diabetic ketoacidosis with coma associated with type 1 diabetes mellitus  Patient's FSGs are uncontrolled due to hyperglycemia on current medication regimen.  Sugar free clear liquids  DKA best practice pathway initiated  He is on IV insulin to continue until BHB is normalized  He is near obtunded and withdrawn currently, so unable to gather history  BMP q4 hours      Last A1c reviewed-   Lab Results   Component Value Date    HGBA1C 10.4 (H) 01/27/2023     Most recent fingerstick glucose reviewed-   Recent Labs   Lab 02/02/23  1921 02/02/23  2044 02/02/23  2151 02/02/23  2253   POCTGLUCOSE >500* >500* 450* 388*     Current correctional scale  High  Increase anti-hyperglycemic dose as  follows-   Antihyperglycemics (From admission, onward)    Start     Stop Route Frequency Ordered    02/02/23 2245  insulin regular in 0.9 % NaCl 100 unit/100 mL (1 unit/mL) infusion        Question Answer Comment   Insulin Rate Adjustment (DO NOT MODIFY ANSWER) [email or web address] [id number]    Enter initial dose from Infusion Protocol Chart (Units/hr): 3        -- IV Continuous 02/02/23 2145        Hold Oral hypoglycemics while patient is in the hospital.    RAMYA (acute kidney injury)  Patient with acute kidney injury likely due to IVVD/dehydration and pre-renal azotemia RAMYA is currently worsening. Labs reviewed- Renal function/electrolytes with Estimated Creatinine Clearance: 50.1 mL/min (based on SCr of 1.3 mg/dL). according to latest data. Monitor urine output and serial BMP and adjust therapy as needed. Avoid nephrotoxins and renally dose meds for GFR listed above.      Latest Reference Range & Units 01/28/23 12:02 02/02/23 18:14 02/02/23 22:21   Creatinine 0.5 - 1.4 mg/dL 1.2 1.9 (H) 1.3          Noncompliance with medication regimen  This is a chronic issue noted for him in Epic      Hyperkalemia  He was treated for this in the ED  He is rapidly improving after bicarb, iv fluids and iv insulin     Latest Reference Range & Units 01/28/23 12:02 02/02/23 18:14 02/02/23 22:21   Potassium 3.5 - 5.1 mmol/L 4.7 6.3 (HH) 5.5 (H)       COPD (chronic obstructive pulmonary disease)    Duonebs q4 prn wheezing    cetirizine tablet 10 mg, 10 mg, Oral, Daily     Chronic hepatitis C  Follow up with Hepatology  Avoid all ETOH      Tobacco use disorder, moderate, dependence  Will need counseling once more alert      History of heroin abuse  Unsure if he is still using or not      Chronic relapsing pancreatitis  He did come with abdominal pain, but lipase was normal        VTE Risk Mitigation (From admission, onward)         Ordered     heparin (porcine) injection 5,000 Units  Every 8  hours         02/02/23 2145     IP VTE HIGH RISK PATIENT  Once         02/02/23 2145     Place sequential compression device  Until discontinued         02/02/23 2145     Place DAWIT hose  Until discontinued         02/02/23 2145               Critical care time spent on the evaluation and treatment of severe organ dysfunction, review of pertinent labs and imaging studies, discussions with consulting providers and discussions with patient/family: 70 minutes.    JOSE ANTONIO Mendieta MD  Department of Hospital Medicine   [facility] [place]

## 2023-02-03 NOTE — ED NOTES
Lab at bedside attempting to draw blood when pt begins to yell and swing at . Upon review of pt's MAR, only PRN order for agitation is olanzapine q8 hours, last administered at 0554. MD messaged and made aware that PRN order is needed.

## 2023-02-03 NOTE — ED NOTES
No change to Insulin infusion rate per MD written order with protocol. Verified no change with RITA Dangelo.

## 2023-02-03 NOTE — ASSESSMENT & PLAN NOTE
Patient's FSGs are uncontrolled due to hyperglycemia on current medication regimen.  Sugar free clear liquids  DKA best practice pathway initiated  He is on IV insulin to continue until BHB is normalized  He is near obtunded and withdrawn currently, so unable to gather history  BMP q4 hours      Last A1c reviewed-   Lab Results   Component Value Date    HGBA1C 10.4 (H) 01/27/2023     Most recent fingerstick glucose reviewed-   Recent Labs   Lab 02/02/23  1921 02/02/23  2044 02/02/23  2151 02/02/23  2253   POCTGLUCOSE >500* >500* 450* 388*     Current correctional scale  High  Increase anti-hyperglycemic dose as follows-   Antihyperglycemics (From admission, onward)    Start     Stop Route Frequency Ordered    02/02/23 2245  insulin regular in 0.9 % NaCl 100 unit/100 mL (1 unit/mL) infusion        Question Answer Comment   Insulin Rate Adjustment (DO NOT MODIFY ANSWER) [email or web address] [id number]    Enter initial dose from Infusion Protocol Chart (Units/hr): 3        -- IV Continuous 02/02/23 2145        Hold Oral hypoglycemics while patient is in the hospital.

## 2023-02-03 NOTE — ED NOTES
Patient remains free from injury or falls. Vital signs stable on room air. Positions self independently. Voiding adequately through shift. Patient is very agitated but sleepy and will allow for vitals. Telemetry maintained. Sitter is at the bedside. Bed in low locked position. Will continue to monitor.

## 2023-02-03 NOTE — SUBJECTIVE & OBJECTIVE
Patient History               Medical as of 2/3/2023       Past Medical History       Diagnosis Date Comments Source    Acute on chronic pancreatitis 04/03/2016 -- Provider    Addiction to drug -- -- Provider    CHF (congestive heart failure) -- -- Provider    DKA (diabetic ketoacidoses) 02/2020 -- Provider    DM (diabetes mellitus), type 1 -- -- Provider    HCV (hepatitis C virus) -- high VL  Provider    HTN (hypertension) -- -- Provider    Hx of psychiatric care -- -- Provider    Hypomagnesemia 05/25/2020 -- Provider    IVDU (intravenous drug user) -- Heroin Provider    Pancreatitis -- -- Provider    Psychiatric problem -- -- Provider    Substance abuse -- -- Provider              Pertinent Negatives       Diagnosis Date Noted Comments Source    History of psychiatric hospitalization 09/19/2022 -- Provider    Therapy 09/19/2022 -- Provider                          Surgical as of 2/3/2023       Past Surgical History       Procedure Laterality Date Comments Source    SHOULDER SURGERY -- 2013 right shoulder, spur removal. Provider    CARDIAC SURGERY -- 1999 stent placed. ([facility]) Provider    ESOPHAGOGASTRODUODENOSCOPY N/A 3/5/2020 Gastritis.  No H pylori.  Completed PPI for 1 month.  Procedure: EGD (ESOPHAGOGASTRODUODENOSCOPY);  Surgeon: Brinda Rene MD;  Location: [facility] ENDO;  Service: Endoscopy;  Laterality: N/A;  W421 A; [id number] Provider    LEFT HEART CATHETERIZATION Left 9/28/2020 Procedure: Left heart cath;  Surgeon: Fito Rangel MD;  Location: [facility] CATH LAB;  Service: Cardiology;  Laterality: Left; Provider                          Family as of 2/3/2023       Problem Relation Name Age of Onset Comments Source    Asthma Mother -- -- -- Provider                  Tobacco Use as of 2/3/2023       Smoking Status Smoking Start Date Last Attempt to Quit Smoking Frequency    Every Day 4/3/1981 6/1/2020 1 pack/day for 20.00 years (20.00 pk-yrs)      Smokeless Status Smokeless Type Smokeless Quit  Date    Never -- --      Tobacco Comments    states approx 1 per day.      Source    --                  Alcohol Use as of 2/3/2023       Alcohol Use Drinks/Week Alcohol/Week Comments Source    Not Currently 0 Standard drinks or equivalent 0.0 standard drinks -- Provider                  Drug Use as of 2/3/2023       Drug Use Types Frequency Comments Source    Yes  Heroin, IV, Marijuana, Amphetamines -- chronic methamphetamine user Provider                  Sexual Activity as of 2/3/2023       Sexually Active Birth Control Partners Comments Source    Yes -- Female -- Provider                  Activities of Daily Living as of 2/3/2023    None               Social Documentation as of 2/3/2023    None               Occupational as of 2/3/2023    None               Socioeconomic as of 2/3/2023       Marital Status Spouse Name Number of Children Years Education Education Level Preferred Language Ethnicity Race Source    Single -- -- -- -- English Not  or /a White --                  Pertinent History       Question Response Comments    Lives with family mother    Place in Birth Order -- --    Lives in home --    Number of Siblings -- --    Raised by -- --    Legal Involvement -- --    Childhood Trauma -- --    Criminal History of -- --    Financial Status unemployed --    Highest Level of Education -- --    Does patient have access to a firearm? -- --     Service -- --    Primary Leisure Activity -- --    Spirituality -- --          Past Medical History:   Diagnosis Date    Acute on chronic pancreatitis 04/03/2016    Addiction to drug     CHF (congestive heart failure)     DKA (diabetic ketoacidoses) 02/2020    DM (diabetes mellitus), type 1     HCV (hepatitis C virus)     high VL     HTN (hypertension)     Hx of psychiatric care     Hypomagnesemia 05/25/2020    IVDU (intravenous drug user)     Heroin    Pancreatitis     Psychiatric problem     Substance abuse      Past Surgical History:   Procedure  Laterality Date    CARDIAC SURGERY      stent placed. ([facility])    ESOPHAGOGASTRODUODENOSCOPY N/A 3/5/2020    Gastritis.  No H pylori.  Completed PPI for 1 month.  Procedure: EGD (ESOPHAGOGASTRODUODENOSCOPY);  Surgeon: Brinda Rene MD;  Location: [facility] ENDO;  Service: Endoscopy;  Laterality: N/A;  W421 A; [phone]    LEFT HEART CATHETERIZATION Left 2020    Procedure: Left heart cath;  Surgeon: Fito Rangel MD;  Location: [facility] CATH LAB;  Service: Cardiology;  Laterality: Left;    SHOULDER SURGERY      right shoulder, spur removal.     Family History       Problem Relation (Age of Onset)    Asthma Mother          Tobacco Use    Smoking status: Every Day     Packs/day: 1.00     Years: 20.00     Pack years: 20.00     Types: Cigarettes     Start date: 4/3/1981     Last attempt to quit: 2020     Years since quittin.6    Smokeless tobacco: Never    Tobacco comments:     states approx 1 per day.   Substance and Sexual Activity    Alcohol use: Not Currently     Alcohol/week: 0.0 standard drinks    Drug use: Yes     Types: Heroin, IV, Marijuana, Amphetamines     Comment: chronic methamphetamine user    Sexual activity: Yes     Partners: Female     Review of patient's allergies indicates:  No Known Allergies    No current facility-administered medications on file prior to encounter.     Current Outpatient Medications on File Prior to Encounter   Medication Sig    aspirin 81 MG Chew Take 1 tablet (81 mg total) by mouth once daily.    atorvastatin (LIPITOR) 20 MG tablet Take 1 tablet (20 mg total) by mouth once daily. (Patient not taking: Reported on 10/18/2022)    cetirizine (ZYRTEC) 10 MG tablet Take 1 tablet (10 mg total) by mouth once daily.    dextromethorphan 15 mg/5 mL syrup Take 5 mLs (15 mg total) by mouth every 4 to 6 hours as needed for Cough.    gabapentin (NEURONTIN) 400 MG capsule Take 400 mg by mouth 3 (three) times daily.    insulin detemir U-100 (LEVEMIR FLEXTOUCH) 100  "unit/mL (3 mL) SubQ InPn pen Inject 40 Units into the skin every evening.    insulin lispro 100 unit/mL pen Inject 7 Units into the skin 3 (three) times daily with meals.    naloxone (NARCAN) 4 mg/actuation Spry 4mg by nasal route as needed for opioid overdose; may repeat every 2-3 minutes in alternating nostrils until medical help arrives. Call 911 (Patient not taking: Reported on 10/18/2022)    nitroGLYCERIN (NITROSTAT) 0.4 MG SL tablet Place 1 tablet (0.4 mg total) under the tongue every 5 (five) minutes as needed for Chest pain. (Patient not taking: Reported on 9/16/2022)    TRESIBA FLEXTOUCH U-200 200 unit/mL (3 mL) insulin pen Inject 30 Units into the skin Daily.    [DISCONTINUED] insulin aspart U-100 (NOVOLOG) 100 unit/mL injection Inject 1-10 Units into the skin 3 (three) times daily with meals.     Psychotherapeutics (From admission, onward)      Start     Stop Route Frequency Ordered    02/03/23 1115  OLANZapine tablet 10 mg         -- Oral 2 times daily 02/03/23 1105    02/03/23 1053  OLANZapine injection 10 mg         07/02 0253 IM Once as needed 02/03/23 0953    02/03/23 0655  OLANZapine injection 5 mg         -- IM Every 8 hours PRN 02/03/23 0555          Review of Systems   Unable to perform ROS: Mental status change   Strengths and Liabilities: Liability: Patient is defensive., Liability: Patient is dependent., Liability: Patient is hostile., Liability: Patient has poor health., Liability: Patient has poor judgment, Liability: Patient has possible cognitive impairment., Liability: Patient lacks coping skills.    Objective:     Vital Signs (Most Recent):  Temp: 97.6 °F (36.4 °C) (02/03/23 0704)  Pulse: 88 (02/03/23 1343)  Resp: 19 (02/03/23 1343)  BP: 121/65 (02/03/23 1304)  SpO2: 100 % (02/03/23 1343) Vital Signs (24h Range):  Temp:  [97.6 °F (36.4 °C)-97.8 °F (36.6 °C)] 97.6 °F (36.4 °C)  Pulse:  [] 88  Resp:  [0-26] 19  SpO2:  [86 %-100 %] 100 %  BP: (105-172)/(54-79) 121/65     Height: 5' 8" " (172.7 cm)  Weight: 55.8 kg (123 lb)  Body mass index is 18.7 kg/m².      Intake/Output Summary (Last 24 hours) at 2/3/2023 1414  Last data filed at 2/3/2023 1353  Gross per 24 hour   Intake 1299.44 ml   Output 90 ml   Net 1209.44 ml       Physical Exam  Psychiatric:      Comments: EXAMINATION    CONSTITUTIONAL  General Appearance: hospital attire    MUSCULOSKELETAL  Muscle Strength and Tone: normal  Abnormal Involuntary Movements: none noted  Gait and Station: not observed    PSYCHIATRIC MENTAL STATUS EXAM   Level of Consciousness: sleeping but awakens  Orientation: does not answer  Grooming: poor hygiene  Psychomotor Behavior: restless  Speech: pressured and irritable  Language: no abnormalities  Mood: does not answer  Affect: irritable  Thought Process: perseverative on leaving  Associations: concrete  Thought Content: does not answer  Memory: does not answer  Attention: poor to recent and remote  Fund of Knowledge: poor to conversation  Insight: poor into his medical and mental care  Judgment: poor into cooperation with treatment and care            Significant Labs: All pertinent labs within the past 24 hours have been reviewed.    Significant Imaging: I have reviewed all pertinent imaging results/findings within the past 24 hours.

## 2023-02-03 NOTE — ED NOTES
Per Dr. Gorman, insulin infusion discontinued one hour after 20u insulin detemir administered subQ. Pt's .

## 2023-02-03 NOTE — HPI
"Mr. Mabry is a 58yo man with a past medical history of HTN, IV heroin abuse, DM1 with DKA, Hep C, pancreatitis, and COPD due to long term cigarette smoking.  He is known to me from admitting him multiple prior times here at  for DKA and COPD exacerbations.    I examined Mr. Mabry in the ED.  He is lethargic, will not open his eyes, is not verbal, and will only groan to sternal rub.  I am unable to gather any history from him.  The triage nurse was able to gather a little information from him on arrival noting, "Pt states that his upper abdomen is hurting 9/10 pain. Patient has associated symptoms of Nausea/vomiting. Pt is sleepy but wakes up with verbal stimulus. Pt. Gets verbally abusive when asked a lot of questions."    In the ED his VS were BP (!) 172/79   Pulse (!) 121   Temp 97.8 °F (36.6 °C) (Oral)   Resp 17   Ht 5' 8" (1.727 m)   Wt 55.8 kg (123 lb)   SpO2 100% RA  BMI 18.70 kg/m².  Labs showed normal CBC, Cr 1.9, K 6.3, gluc 786, AG 29, BHB 6.8, VBG: pH 7.084, PCO2 37.5.      CXR showed no acute intrathoracic abnormality detected.  CT head showed no acute findings.  Chronic changes with ventricular enlargement, as above.  Please correlate for normal pressure hydrocephalus.  No adverse change from 08/10/2022.  CT abdomen and pelvis showed no acute findings.  Moderately large colonic stool.  Question constipation.  Small fluid in the distal esophagus.  Findings may suggest esophageal dysmotility issue or reflux.  Questionable scarring at the right lung base.    In the ED he was treated with:  Medications   albuterol-ipratropium 2.5 mg-0.5 mg/3 mL nebulizer solution 3 mL (3 mLs Nebulization Not Given 2/2/23 1925)   dextrose 50% injection 25 g (has no administration in time range)   dextrose 50% injection 12.5 g (has no administration in time range)   insulin regular in 0.9 % NaCl 100 unit/100 mL (1 unit/mL) infusion (3.5 Units/hr Intravenous Rate/Dose Change 2/2/23 2048)   lactated ringers " bolus 1,000 mL (0 mLs Intravenous Stopped 2/2/23 2007)   calcium gluconate 1 g in NS IVPB (premixed) (0 g Intravenous Stopped 2/2/23 2004)   lactated ringers bolus 1,000 mL (0 mLs Intravenous Stopped 2/2/23 2117)   sodium bicarbonate solution 50 mEq (50 mEq Intravenous Given 2/2/23 1925)   insulin regular injection 10 Units 0.1 mL (10 Units Intravenous Given 2/2/23 1923)
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
## 2023-02-03 NOTE — CARE UPDATE
"I have PEC'd Mr. Mabry for violent, agitated and suicidal ideation behavior.  He is screaming, cursing, highly agitated and states, "I don't care if I go home and pass away."  I feel he is still too confused from his encephalopathy to have capacity to make medical decisions.  I am giving Zyprexa 5mg im and consulting psychiatry.  "

## 2023-02-03 NOTE — CONSULTS
Food & Nutrition  Education    Diet Education: Carb Controlled Diet Education/Diabetic Diet Education      Nutrition Education provided with handouts: Diabetes and Diet, Sick Day Management for Diabetics, Diabetic Meal Planning      Comments: Pt admitted for DKA with PMH of HTN, IV heroin abuse, DM1 with DKA, Hep C, pancreatitis, and COPD. Glu 786 at admit, trending down now at 386. Hgb A1c 10.4%. RD remote for coverage, pt in ED at this time, not appropriate for education currently. RD to follow up. Handouts attached to discharge paperwork.       Follow-Up: Yes    Please Re-consult as needed        Thanks!

## 2023-02-03 NOTE — ED NOTES
"Pt being extremely verbally abusive to RN, refusing blood draw, screaming at RN yelling "I don't fucking care about the blood, leave me the fuck alone. I don't give a god damn about you nurse" and "I JUST WANT TO GO HOME, JUST LET ME GO HOME AND DIE". Pt swinging arms at RN. Charge RN and security at bedside.   "

## 2023-02-03 NOTE — ED NOTES
"Pt resting with eyes closed until RN informed pt of need for blood sample. Pt states "I HATE YOU AND I HATE THIS BLOOD SAMPLE, I WISH YOU'D LEAVE ME ALONE". Pt continues to be verbally aggressive with RN as RN gets blood sample form pt.   "

## 2023-02-03 NOTE — SUBJECTIVE & OBJECTIVE
Past Medical History:   Diagnosis Date    Acute on chronic pancreatitis 04/03/2016    Addiction to drug     CHF (congestive heart failure)     DKA (diabetic ketoacidoses) 02/2020    DM (diabetes mellitus), type 1     HCV (hepatitis C virus)     high VL     HTN (hypertension)     Hx of psychiatric care     Hypomagnesemia 05/25/2020    IVDU (intravenous drug user)     Heroin    Pancreatitis     Psychiatric problem     Substance abuse        Past Surgical History:   Procedure Laterality Date    CARDIAC SURGERY  1999    stent placed. ([facility])    ESOPHAGOGASTRODUODENOSCOPY N/A 3/5/2020    Gastritis.  No H pylori.  Completed PPI for 1 month.  Procedure: EGD (ESOPHAGOGASTRODUODENOSCOPY);  Surgeon: Brinda Rene MD;  Location: [facility] ENDO;  Service: Endoscopy;  Laterality: N/A;  [id number] A; [phone]    LEFT HEART CATHETERIZATION Left 9/28/2020    Procedure: Left heart cath;  Surgeon: Fito Rangel MD;  Location: [facility] CATH LAB;  Service: Cardiology;  Laterality: Left;    SHOULDER SURGERY  2013    right shoulder, spur removal.       Review of patient's allergies indicates:  No Known Allergies    No current facility-administered medications on file prior to encounter.     Current Outpatient Medications on File Prior to Encounter   Medication Sig    aspirin 81 MG Chew Take 1 tablet (81 mg total) by mouth once daily.    atorvastatin (LIPITOR) 20 MG tablet Take 1 tablet (20 mg total) by mouth once daily. (Patient not taking: Reported on 10/18/2022)    cetirizine (ZYRTEC) 10 MG tablet Take 1 tablet (10 mg total) by mouth once daily.    dextromethorphan 15 mg/5 mL syrup Take 5 mLs (15 mg total) by mouth every 4 to 6 hours as needed for Cough.    gabapentin (NEURONTIN) 400 MG capsule Take 400 mg by mouth 3 (three) times daily.    insulin detemir U-100 (LEVEMIR FLEXTOUCH) 100 unit/mL (3 mL) SubQ InPn pen Inject 40 Units into the skin every evening.    insulin lispro 100 unit/mL pen Inject 7 Units into the skin 3 (three)  times daily with meals.    naloxone (NARCAN) 4 mg/actuation Spry 4mg by nasal route as needed for opioid overdose; may repeat every 2-3 minutes in alternating nostrils until medical help arrives. Call 911 (Patient not taking: Reported on 10/18/2022)    nitroGLYCERIN (NITROSTAT) 0.4 MG SL tablet Place 1 tablet (0.4 mg total) under the tongue every 5 (five) minutes as needed for Chest pain. (Patient not taking: Reported on 2022)    TRESIBA FLEXTOUCH U-200 200 unit/mL (3 mL) insulin pen Inject 30 Units into the skin Daily.    [DISCONTINUED] insulin aspart U-100 (NOVOLOG) 100 unit/mL injection Inject 1-10 Units into the skin 3 (three) times daily with meals.     Family History       Problem Relation (Age of Onset)    Asthma Mother          Tobacco Use    Smoking status: Every Day     Packs/day: 1.00     Years: 20.00     Pack years: 20.00     Types: Cigarettes     Start date: 4/3/1981     Last attempt to quit: 2020     Years since quittin.6    Smokeless tobacco: Never    Tobacco comments:     states approx 1 per day.   Substance and Sexual Activity    Alcohol use: Not Currently     Alcohol/week: 0.0 standard drinks    Drug use: Yes     Types: Heroin, IV, Marijuana, Amphetamines    Sexual activity: Yes     Partners: Female     Review of Systems  Objective:     Vital Signs (Most Recent):  Temp: 97.8 °F (36.6 °C) (23 1634)  Pulse: (!) 112 (23 0002)  Resp: 20 (23)  BP: 130/61 (23 0002)  SpO2: 96 % (23)   Vital Signs (24h Range):  Temp:  [97.8 °F (36.6 °C)] 97.8 °F (36.6 °C)  Pulse:  [111-121] 112  Resp:  [17-26] 20  SpO2:  [96 %-100 %] 96 %  BP: (127-172)/(59-79) 130/61     Weight: 55.8 kg (123 lb)  Body mass index is 18.7 kg/m².    Physical Exam  Vitals and nursing note reviewed.   Constitutional:       General: He is sleeping. He is in acute distress.      Appearance: He is well-developed and underweight. He is ill-appearing, toxic-appearing and diaphoretic.       Comments: Bilateral temporal wasting   HENT:      Head: Normocephalic and atraumatic.      Nose: Nose normal.      Mouth/Throat:      Pharynx: No oropharyngeal exudate.   Eyes:      General: No scleral icterus.        Right eye: No discharge.         Left eye: No discharge.      Extraocular Movements: EOM normal.      Conjunctiva/sclera:      Right eye: Right conjunctiva is injected.      Left eye: Left conjunctiva is injected.      Pupils: Pupils are equal, round, and reactive to light.   Neck:      Thyroid: No thyromegaly.      Vascular: No JVD.      Trachea: No tracheal deviation.   Cardiovascular:      Rate and Rhythm: Regular rhythm. Tachycardia present.      Heart sounds: Normal heart sounds. No murmur heard.    No friction rub. No gallop.   Pulmonary:      Effort: Pulmonary effort is normal. Tachypnea present. No respiratory distress.      Breath sounds: No stridor. Rhonchi present. No decreased breath sounds, wheezing or rales.   Chest:      Chest wall: No swelling or tenderness.   Abdominal:      General: Abdomen is protuberant. Bowel sounds are normal. There is no distension.      Palpations: Abdomen is soft. There is no mass.      Comments: He does not awaken or grimace on palpation of his abdomen   Genitourinary:     Comments: No pena in place  Musculoskeletal:         General: No tenderness. Normal range of motion.      Cervical back: Normal range of motion and neck supple.   Lymphadenopathy:      Cervical: No cervical adenopathy.      Comments: Trace edema to legs   Skin:     General: Skin is cool and moist.      Coloration: Skin is sallow. Skin is not pale.      Findings: No erythema or rash.   Neurological:      Mental Status: He is lethargic and disoriented.      GCS: GCS eye subscore is 2. GCS verbal subscore is 1. GCS motor subscore is 5.      Cranial Nerves: No cranial nerve deficit.   Psychiatric:         Attention and Perception: He is inattentive.         Mood and Affect: Mood is depressed.          Speech: He is noncommunicative. Speech is delayed.         Behavior: Behavior is uncooperative, slowed and withdrawn.         Cognition and Memory: Cognition is impaired. Memory is impaired. He exhibits impaired recent memory and impaired remote memory.         CRANIAL NERVES     CN III, IV, VI   Pupils are equal, round, and reactive to light.  Extraocular motions are normal.      Significant Labs: All pertinent labs within the past 24 hours have been reviewed.  Recent Results (from the past 24 hour(s))   POCT glucose    Collection Time: 02/02/23  5:05 PM   Result Value Ref Range    POCT Glucose >500 (H) 70 - 110 mg/dL   ISTAT PROCEDURE    Collection Time: 02/02/23  6:11 PM   Result Value Ref Range    POC PH 7.084 (L) 7.35 - 7.45    POC PCO2 37.5 35 - 45 mmHg    POC PO2 33 (L) 40 - 60 mmHg    POC HCO3 11.2 (L) 24 - 28 mmol/L    POC BE -18 -2 to 2 mmol/L    POC SATURATED O2 43 (L) 95 - 100 %    POC TCO2 12 (L) 24 - 29 mmol/L    Sample VENOUS     Site Other     Allens Test N/A    CBC auto differential    Collection Time: 02/02/23  6:14 PM   Result Value Ref Range    WBC 12.43 3.90 - 12.70 K/uL    RBC 5.73 4.60 - 6.20 M/uL    Hemoglobin 14.6 14.0 - 18.0 g/dL    Hematocrit 49.0 40.0 - 54.0 %    MCV 86 82 - 98 fL    MCH 25.5 (L) 27.0 - 31.0 pg    MCHC 29.8 (L) 32.0 - 36.0 g/dL    RDW 13.6 11.5 - 14.5 %    Platelets 454 (H) 150 - 450 K/uL    MPV 9.9 9.2 - 12.9 fL    Immature Granulocytes 0.8 (H) 0.0 - 0.5 %    Gran # (ANC) 9.9 (H) 1.8 - 7.7 K/uL    Immature Grans (Abs) 0.10 (H) 0.00 - 0.04 K/uL    Lymph # 1.2 1.0 - 4.8 K/uL    Mono # 1.1 (H) 0.3 - 1.0 K/uL    Eos # 0.1 0.0 - 0.5 K/uL    Baso # 0.05 0.00 - 0.20 K/uL    nRBC 0 0 /100 WBC    Gran % 79.8 (H) 38.0 - 73.0 %    Lymph % 9.5 (L) 18.0 - 48.0 %    Mono % 8.9 4.0 - 15.0 %    Eosinophil % 0.6 0.0 - 8.0 %    Basophil % 0.4 0.0 - 1.9 %    Differential Method Automated    Comprehensive metabolic panel    Collection Time: 02/02/23  6:14 PM   Result Value Ref Range     Sodium 132 (L) 136 - 145 mmol/L    Potassium 6.3 (HH) 3.5 - 5.1 mmol/L    Chloride 97 95 - 110 mmol/L    CO2 6 (LL) 23 - 29 mmol/L    Glucose 786 (HH) 70 - 110 mg/dL    BUN 28 (H) 6 - 20 mg/dL    Creatinine 1.9 (H) 0.5 - 1.4 mg/dL    Calcium 9.1 8.7 - 10.5 mg/dL    Total Protein 8.8 (H) 6.0 - 8.4 g/dL    Albumin 4.0 3.5 - 5.2 g/dL    Total Bilirubin 0.4 0.1 - 1.0 mg/dL    Alkaline Phosphatase 133 55 - 135 U/L    AST 22 10 - 40 U/L    ALT 20 10 - 44 U/L    Anion Gap 29 (H) 8 - 16 mmol/L    eGFR 41 (A) >60 mL/min/1.73 m^2   Lactic acid, plasma    Collection Time: 02/02/23  6:14 PM   Result Value Ref Range    Lactate (Lactic Acid) 2.2 0.5 - 2.2 mmol/L   Lipase    Collection Time: 02/02/23  6:14 PM   Result Value Ref Range    Lipase 45 4 - 60 U/L   Magnesium    Collection Time: 02/02/23  6:14 PM   Result Value Ref Range    Magnesium 2.3 1.6 - 2.6 mg/dL   Troponin I    Collection Time: 02/02/23  6:14 PM   Result Value Ref Range    Troponin I 0.009 0.000 - 0.026 ng/mL   TSH    Collection Time: 02/02/23  6:14 PM   Result Value Ref Range    TSH 0.294 (L) 0.400 - 4.000 uIU/mL   Beta - Hydroxybutyrate, Serum    Collection Time: 02/02/23  6:14 PM   Result Value Ref Range    Beta-Hydroxybutyrate 6.8 (H) 0.0 - 0.5 mmol/L   Ethanol    Collection Time: 02/02/23  6:14 PM   Result Value Ref Range    Alcohol, Serum <10 <10 mg/dL   T4, Free    Collection Time: 02/02/23  6:14 PM   Result Value Ref Range    Free T4 1.13 0.71 - 1.51 ng/dL   POCT glucose    Collection Time: 02/02/23  7:21 PM   Result Value Ref Range    POCT Glucose >500 (H) 70 - 110 mg/dL   POCT glucose    Collection Time: 02/02/23  8:44 PM   Result Value Ref Range    POCT Glucose >500 (H) 70 - 110 mg/dL   POCT glucose    Collection Time: 02/02/23  9:51 PM   Result Value Ref Range    POCT Glucose 450 (HH) 70 - 110 mg/dL   Basic metabolic panel    Collection Time: 02/02/23 10:21 PM   Result Value Ref Range    Sodium 139 136 - 145 mmol/L    Potassium 5.5 (H) 3.5 - 5.1  mmol/L    Chloride 109 95 - 110 mmol/L    CO2 9 (LL) 23 - 29 mmol/L    Glucose 408 (H) 70 - 110 mg/dL    BUN 24 (H) 6 - 20 mg/dL    Creatinine 1.3 0.5 - 1.4 mg/dL    Calcium 7.6 (L) 8.7 - 10.5 mg/dL    Anion Gap 21 (H) 8 - 16 mmol/L    eGFR >60 >60 mL/min/1.73 m^2   POCT glucose    Collection Time: 02/02/23 10:53 PM   Result Value Ref Range    POCT Glucose 388 (H) 70 - 110 mg/dL   Urinalysis, Reflex to Urine Culture Urine, Clean Catch    Collection Time: 02/02/23 11:11 PM    Specimen: Urine   Result Value Ref Range    Specimen UA Urine, Clean Catch     Color, UA Colorless (A) Yellow, Straw, Monet    Appearance, UA Clear Clear    pH, UA 5.0 5.0 - 8.0    Specific Gravity, UA 1.015 1.005 - 1.030    Protein, UA Trace (A) Negative    Glucose, UA 4+ (A) Negative    Ketones, UA 3+ (A) Negative    Bilirubin (UA) Negative Negative    Occult Blood UA Negative Negative    Nitrite, UA Negative Negative    Urobilinogen, UA Negative <2.0 EU/dL    Leukocytes, UA Negative Negative   Urinalysis Microscopic    Collection Time: 02/02/23 11:11 PM   Result Value Ref Range    Bacteria Rare None-Occ /hpf    Yeast, UA None None    Squam Epithel, UA 4 /hpf    Microscopic Comment SEE COMMENT    Drug screen panel, emergency    Collection Time: 02/02/23 11:27 PM   Result Value Ref Range    Benzodiazepines Negative Negative    Methadone metabolites Negative Negative    Cocaine (Metab.) Negative Negative    Opiate Scrn, Ur Negative Negative    Barbiturate Screen, Ur Negative Negative    Amphetamine Screen, Ur Presumptive Positive (A) Negative    THC Negative Negative    Phencyclidine Negative Negative    Creatinine, Urine 17.4 (L) 23.0 - 375.0 mg/dL    Toxicology Information SEE COMMENT    ISTAT PROCEDURE    Collection Time: 02/02/23 11:45 PM   Result Value Ref Range    POC PH 7.495 (H) 7.35 - 7.45    POC PCO2 43.5 35 - 45 mmHg    POC PO2 71 (HH) 40 - 60 mmHg    POC HCO3 33.5 (H) 24 - 28 mmol/L    POC BE 9 -2 to 2 mmol/L    POC SATURATED O2 95  95 - 100 %    POC TCO2 35 (H) 24 - 29 mmol/L    Sample VENOUS     Site Other     Allens Test N/A          Significant Imaging: I have reviewed all pertinent imaging results/findings within the past 24 hours.

## 2023-02-03 NOTE — ASSESSMENT & PLAN NOTE
Patient with acute kidney injury likely due to IVVD/dehydration and pre-renal azotemia RAMYA is currently worsening. Labs reviewed- Renal function/electrolytes with Estimated Creatinine Clearance: 50.1 mL/min (based on SCr of 1.3 mg/dL). according to latest data. Monitor urine output and serial BMP and adjust therapy as needed. Avoid nephrotoxins and renally dose meds for GFR listed above.      Latest Reference Range & Units 01/28/23 12:02 02/02/23 18:14 02/02/23 22:21   Creatinine 0.5 - 1.4 mg/dL 1.2 1.9 (H) 1.3

## 2023-02-03 NOTE — ED NOTES
"Pt refusing to let RN check blood glucose. Pt being verbally abusive towards RN and uncooperative. Pt finally gives RN hand to check blood glucose and states to RN "I HATE YOU".   "

## 2023-02-03 NOTE — ED NOTES
Pt returned from CT scan with this RN. Pt tolerated scans well. Repeat POCT glucose to be taken now, RN to follow DKA Insulin adjustment orders.

## 2023-02-03 NOTE — ASSESSMENT & PLAN NOTE
Long history of opioid and amphetamine abuse.  Positive for amphetamines at this admit, again as last few admits.  Not open to conversation and education about rehab and sobriety.

## 2023-02-03 NOTE — ASSESSMENT & PLAN NOTE
He was treated for this in the ED  He is rapidly improving after bicarb, iv fluids and iv insulin     Latest Reference Range & Units 01/28/23 12:02 02/02/23 18:14 02/02/23 22:21   Potassium 3.5 - 5.1 mmol/L 4.7 6.3 (HH) 5.5 (H)

## 2023-02-03 NOTE — ED NOTES
Handoff report taken from RITA Rivera. Assumd care at this time. Pt resting with eyes closed, IVF and Calcium gluconate infusing.

## 2023-02-03 NOTE — PROVIDER TRANSFER
57y M w/ substance induced mood disorder and DM admitted for DKA. PEC'd for violent agitation.     DKA resolved, transitioned to sq insulin. Psychiatry consulted, recommending psychiatric placement

## 2023-02-03 NOTE — ASSESSMENT & PLAN NOTE
Patient with chronic, potentially violent agitation and irritability.  Agree with PEC/CEC and 1:1 sitter.  Seek acute inpatient psychiatric facility when medically cleared.  Patient has a chronic history of drug use, mostly opioids and amphetamines, which are playing a part in his mood and agitation.  Agree with depakote 500 mg TID and olanzapine 10 mg BID.  Patient has no outpatient psychiatrist, so nobody can be contacted about his medication history.  Should he become agitated or refuse the depakote or olanzapine, provide Thorazine  mg IM.  Can also use Thorazine  mg every 8 hours PRN acute psychotic or non-redirectable agitation.
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
## 2023-02-03 NOTE — ED NOTES
Pt refusing to allow RN to get blood glucose despite RN's efforts. Pt cussing and being verbally abusive to staff. This RN informed pt that this blood test was needed to care for him properly. Pt reluctantly allowed RN to complete blood glucose.

## 2023-02-04 VITALS
RESPIRATION RATE: 18 BRPM | WEIGHT: 123 LBS | HEART RATE: 86 BPM | DIASTOLIC BLOOD PRESSURE: 75 MMHG | TEMPERATURE: 98 F | SYSTOLIC BLOOD PRESSURE: 134 MMHG | OXYGEN SATURATION: 95 % | BODY MASS INDEX: 18.64 KG/M2 | HEIGHT: 68 IN

## 2023-02-04 LAB
ANION GAP SERPL CALC-SCNC: 8 MMOL/L (ref 8–16)
BASOPHILS # BLD AUTO: 0.02 K/UL (ref 0–0.2)
BASOPHILS NFR BLD: 0.3 % (ref 0–1.9)
BUN SERPL-MCNC: 21 MG/DL (ref 6–20)
CALCIUM SERPL-MCNC: 8.4 MG/DL (ref 8.7–10.5)
CHLORIDE SERPL-SCNC: 102 MMOL/L (ref 95–110)
CO2 SERPL-SCNC: 23 MMOL/L (ref 23–29)
CREAT SERPL-MCNC: 1.2 MG/DL (ref 0.5–1.4)
DIFFERENTIAL METHOD: ABNORMAL
EOSINOPHIL # BLD AUTO: 0.2 K/UL (ref 0–0.5)
EOSINOPHIL NFR BLD: 3.5 % (ref 0–8)
ERYTHROCYTE [DISTWIDTH] IN BLOOD BY AUTOMATED COUNT: 13.9 % (ref 11.5–14.5)
EST. GFR  (NO RACE VARIABLE): >60 ML/MIN/1.73 M^2
GLUCOSE SERPL-MCNC: 447 MG/DL (ref 70–110)
HCT VFR BLD AUTO: 36.8 % (ref 40–54)
HGB BLD-MCNC: 11.9 G/DL (ref 14–18)
IMM GRANULOCYTES # BLD AUTO: 0.03 K/UL (ref 0–0.04)
IMM GRANULOCYTES NFR BLD AUTO: 0.5 % (ref 0–0.5)
LYMPHOCYTES # BLD AUTO: 1.2 K/UL (ref 1–4.8)
LYMPHOCYTES NFR BLD: 20 % (ref 18–48)
MAGNESIUM SERPL-MCNC: 2 MG/DL (ref 1.6–2.6)
MCH RBC QN AUTO: 25.5 PG (ref 27–31)
MCHC RBC AUTO-ENTMCNC: 32.3 G/DL (ref 32–36)
MCV RBC AUTO: 79 FL (ref 82–98)
MONOCYTES # BLD AUTO: 0.5 K/UL (ref 0.3–1)
MONOCYTES NFR BLD: 8.4 % (ref 4–15)
NEUTROPHILS # BLD AUTO: 4.2 K/UL (ref 1.8–7.7)
NEUTROPHILS NFR BLD: 67.3 % (ref 38–73)
NRBC BLD-RTO: 0 /100 WBC
PHOSPHATE SERPL-MCNC: 2.1 MG/DL (ref 2.7–4.5)
PLATELET # BLD AUTO: 286 K/UL (ref 150–450)
PMV BLD AUTO: 9.4 FL (ref 9.2–12.9)
POCT GLUCOSE: 328 MG/DL (ref 70–110)
POCT GLUCOSE: 403 MG/DL (ref 70–110)
POTASSIUM SERPL-SCNC: 4.5 MMOL/L (ref 3.5–5.1)
RBC # BLD AUTO: 4.66 M/UL (ref 4.6–6.2)
SODIUM SERPL-SCNC: 133 MMOL/L (ref 136–145)
WBC # BLD AUTO: 6.21 K/UL (ref 3.9–12.7)

## 2023-02-04 PROCEDURE — 80048 BASIC METABOLIC PNL TOTAL CA: CPT | Performed by: STUDENT IN AN ORGANIZED HEALTH CARE EDUCATION/TRAINING PROGRAM

## 2023-02-04 PROCEDURE — 25000003 PHARM REV CODE 250: Performed by: STUDENT IN AN ORGANIZED HEALTH CARE EDUCATION/TRAINING PROGRAM

## 2023-02-04 PROCEDURE — 63600175 PHARM REV CODE 636 W HCPCS: Performed by: STUDENT IN AN ORGANIZED HEALTH CARE EDUCATION/TRAINING PROGRAM

## 2023-02-04 PROCEDURE — 84100 ASSAY OF PHOSPHORUS: CPT | Performed by: STUDENT IN AN ORGANIZED HEALTH CARE EDUCATION/TRAINING PROGRAM

## 2023-02-04 PROCEDURE — 36415 COLL VENOUS BLD VENIPUNCTURE: CPT | Performed by: STUDENT IN AN ORGANIZED HEALTH CARE EDUCATION/TRAINING PROGRAM

## 2023-02-04 PROCEDURE — 83735 ASSAY OF MAGNESIUM: CPT | Performed by: STUDENT IN AN ORGANIZED HEALTH CARE EDUCATION/TRAINING PROGRAM

## 2023-02-04 PROCEDURE — 85025 COMPLETE CBC W/AUTO DIFF WBC: CPT | Performed by: STUDENT IN AN ORGANIZED HEALTH CARE EDUCATION/TRAINING PROGRAM

## 2023-02-04 RX ORDER — INSULIN ASPART 100 [IU]/ML
1-10 INJECTION, SOLUTION INTRAVENOUS; SUBCUTANEOUS
Status: DISCONTINUED | OUTPATIENT
Start: 2023-02-04 | End: 2023-02-04 | Stop reason: HOSPADM

## 2023-02-04 RX ORDER — IBUPROFEN 200 MG
16 TABLET ORAL
Status: DISCONTINUED | OUTPATIENT
Start: 2023-02-04 | End: 2023-02-04 | Stop reason: HOSPADM

## 2023-02-04 RX ORDER — IBUPROFEN 200 MG
24 TABLET ORAL
Status: DISCONTINUED | OUTPATIENT
Start: 2023-02-04 | End: 2023-02-04 | Stop reason: HOSPADM

## 2023-02-04 RX ORDER — GLUCAGON 1 MG
1 KIT INJECTION
Status: DISCONTINUED | OUTPATIENT
Start: 2023-02-04 | End: 2023-02-04 | Stop reason: HOSPADM

## 2023-02-04 RX ADMIN — HEPARIN SODIUM 5000 UNITS: 5000 INJECTION INTRAVENOUS; SUBCUTANEOUS at 05:02

## 2023-02-04 RX ADMIN — OLANZAPINE 10 MG: 2.5 TABLET, FILM COATED ORAL at 09:02

## 2023-02-04 RX ADMIN — DIVALPROEX SODIUM 500 MG: 125 CAPSULE, COATED PELLETS ORAL at 03:02

## 2023-02-04 RX ADMIN — GABAPENTIN 400 MG: 400 CAPSULE ORAL at 09:02

## 2023-02-04 RX ADMIN — ATORVASTATIN CALCIUM 20 MG: 10 TABLET, FILM COATED ORAL at 09:02

## 2023-02-04 RX ADMIN — ASPIRIN 81 MG CHEWABLE TABLET 81 MG: 81 TABLET CHEWABLE at 09:02

## 2023-02-04 RX ADMIN — CETIRIZINE HYDROCHLORIDE 10 MG: 10 TABLET, FILM COATED ORAL at 09:02

## 2023-02-04 RX ADMIN — GABAPENTIN 400 MG: 400 CAPSULE ORAL at 03:02

## 2023-02-04 RX ADMIN — DIVALPROEX SODIUM 500 MG: 125 CAPSULE, COATED PELLETS ORAL at 05:02

## 2023-02-04 RX ADMIN — INSULIN ASPART 5 UNITS: 100 INJECTION, SOLUTION INTRAVENOUS; SUBCUTANEOUS at 11:02

## 2023-02-04 RX ADMIN — INSULIN ASPART 10 UNITS: 100 INJECTION, SOLUTION INTRAVENOUS; SUBCUTANEOUS at 11:02

## 2023-02-04 RX ADMIN — HEPARIN SODIUM 5000 UNITS: 5000 INJECTION INTRAVENOUS; SUBCUTANEOUS at 03:02

## 2023-02-04 NOTE — PROGRESS NOTES
Pts mother @ bs, pt began to raise voice and scream at mother. Pt did not calm down, after multiple request for pts mother to come out of the room she came out of pts room. Pt continued to scream and state that he was discharged. Security called. Security informed pts mother that it would be best if she left as pt is acting out. She left. Security then went to pts bedside. Security and this nurse informed pt that he is not discharged to go home. Pt did eventually calm down and lay down in bed.

## 2023-02-04 NOTE — NURSING
In preparation for discharge, removal of patient's saline lock per policy. No distress noted. Report given to nurse Tar[facility] at [facility]. Patient is waiting for transportation.

## 2023-02-04 NOTE — PLAN OF CARE
Problem: Adult Inpatient Plan of Care  Goal: Plan of Care Review  Outcome: Ongoing, Progressing  Goal: Patient-Specific Goal (Individualized)  Outcome: Ongoing, Progressing  Goal: Absence of Hospital-Acquired Illness or Injury  Outcome: Ongoing, Progressing  Goal: Optimal Comfort and Wellbeing  Outcome: Ongoing, Progressing  Goal: Readiness for Transition of Care  Outcome: Ongoing, Progressing     Problem: Diabetic Ketoacidosis  Goal: Fluid and Electrolyte Balance with Absence of Ketosis  Outcome: Ongoing, Progressing     Problem: Infection  Goal: Absence of Infection Signs and Symptoms  Outcome: Ongoing, Progressing       Patient remained free from falls/injury throughout shift.  Patient refused morning labs.  Bed locked in lowest position.  Side rails up x2.  Call bell within reach.  Purposeful rounding maintained throughout shift.

## 2023-02-04 NOTE — PLAN OF CARE
[facility] - Med Surg  Initial Discharge Assessment    Plan for patient discharge to inpatient psych facility. PEC/CEC current and scan in chart.     Per physician, patient medically clear to discharge. ADT 32 order placed and transfer to be arranged.      Primary Care Provider: Primary Doctor No    Admission Diagnosis: Dehydration [E86.0]  Vomiting [R11.10]  DKA (diabetic ketoacidosis) [E11.10]  RAMYA (acute kidney injury) [N17.9]  Violent behavior [R45.6]    Admission Date: 2/2/2023  Expected Discharge Date: 2/4/2023    Discharge Barriers Identified: Mental illness    Payor: [facility] MCARE / Plan: [facility] DUAL COMPLETE HMO SNP / Product Type: Medicare Advantage /     Extended Emergency Contact Information  Primary Emergency Contact: Amarilis Easley   [place]  Mobile Phone: [phone]  Relation: Sister  Secondary Emergency Contact: Marilou coles  Mobile Phone: [phone]  Relation: Mother  Preferred language: English   needed? No    Discharge Plan A: [id number] - [place] - [address] AT [facility] [address]  [address]  [facility] [address]  Phone: [phone] Fax: [phone]    [facility] [address]  [address]   [facility] [address]  Phone: [phone] Fax: [phone]      Initial Assessment (most recent)       Adult Discharge Assessment - 02/04/23 0905          Discharge Assessment    Assessment Type Discharge Planning Assessment     Source of Information health record     Does patient/caregiver understand observation status No     Was observation education provided? No     Reason For Admission Diabetic ketoacidosis     Facility Arrived From: Home     Do you expect to return to your current living situation? Other (see comments)     Prior to hospitilization cognitive status: Unable to Assess     Current cognitive status: Unable to Assess     Patient currently being followed by  outpatient case management? No     Are you on dialysis? No     Do you take coumadin? No     Discharge Plan A Psychiatric hospital     DME Needed Upon Discharge  none     Discharge Barriers Identified Mental illness

## 2023-02-04 NOTE — PLAN OF CARE
If appropriate please refill LOV 3/18/21 [facility] - Med Surg  Discharge Final Note    Patient clear to discharge from case management stand point. Patient accepted by Tiny at [facility] for the service of Dr. Gusman. Report to be called to [phone] opt1. [facility] will arrange transport.     Primary Care Provider: Primary Doctor No    Expected Discharge Date: 2/4/2023    Final Discharge Note (most recent)       Final Note - 02/04/23 1346          Final Note    Assessment Type Final Discharge Note     Anticipated Discharge Disposition Psychiatric Hospital     What phone number can be called within the next 1-3 days to see how you are doing after discharge? [phone]     Hospital Resources/Appts/Education Provided Provided patient/caregiver with written discharge plan information        Post-Acute Status    Discharge Delays None known at this time                     Important Message from Medicare                  s/p unwitnessed fall.  pt denies hitting head or blood thinners.  pt GCS 15. as per EMS right thumb fx

## 2023-02-06 NOTE — DISCHARGE SUMMARY
"[facility] Medicine  Discharge Summary      Patient Name: Cali Mabry  MRN: [id number]  [facility]: [id number]  Patient Class: IP- Inpatient  Admission Date: 2/2/2023  Hospital Length of Stay: 2 days  Discharge Date and Time: 2/4/2023  4:00 PM  Attending Physician: Tiff att. providers found   Discharging Provider: Chapito Graham III, MD  Primary Care Provider: Primary Doctor Tiff    Primary Care Team: Networked reference to record PCT     HPI:   Mr. Mabry is a 58yo man with a past medical history of HTN, IV heroin abuse, DM1 with DKA, Hep C, pancreatitis, and COPD due to long term cigarette smoking.  He is known to me from admitting him multiple prior times here at  for DKA and COPD exacerbations.    I examined Mr. Mabry in the ED.  He is lethargic, will not open his eyes, is not verbal, and will only groan to sternal rub.  I am unable to gather any history from him.  The triage nurse was able to gather a little information from him on arrival noting, "Pt states that his upper abdomen is hurting 9/10 pain. Patient has associated symptoms of Nausea/vomiting. Pt is sleepy but wakes up with verbal stimulus. Pt. Gets verbally abusive when asked a lot of questions."    In the ED his VS were BP (!) 172/79   Pulse (!) 121   Temp 97.8 °F (36.6 °C) (Oral)   Resp 17   Ht 5' 8" (1.727 m)   Wt 55.8 kg (123 lb)   SpO2 100% RA  BMI 18.70 kg/m².  Labs showed normal CBC, Cr 1.9, K 6.3, gluc 786, AG 29, BHB 6.8, VBG: pH 7.084, PCO2 37.5.      CXR showed no acute intrathoracic abnormality detected.  CT head showed no acute findings.  Chronic changes with ventricular enlargement, as above.  Please correlate for normal pressure hydrocephalus.  No adverse change from 08/10/2022.  CT abdomen and pelvis showed no acute findings.  Moderately large colonic stool.  Question constipation.  Small fluid in the distal esophagus.  Findings may suggest esophageal dysmotility issue or reflux.  Questionable scarring " at the right lung base.    In the ED he was treated with:  Medications   albuterol-ipratropium 2.5 mg-0.5 mg/3 mL nebulizer solution 3 mL (3 mLs Nebulization Not Given 2/2/23 1925)   dextrose 50% injection 25 g (has no administration in time range)   dextrose 50% injection 12.5 g (has no administration in time range)   insulin regular in 0.9 % NaCl 100 unit/100 mL (1 unit/mL) infusion (3.5 Units/hr Intravenous Rate/Dose Change 2/2/23 2048)   lactated ringers bolus 1,000 mL (0 mLs Intravenous Stopped 2/2/23 2007)   calcium gluconate 1 g in NS IVPB (premixed) (0 g Intravenous Stopped 2/2/23 2004)   lactated ringers bolus 1,000 mL (0 mLs Intravenous Stopped 2/2/23 2117)   sodium bicarbonate solution 50 mEq (50 mEq Intravenous Given 2/2/23 1925)   insulin regular injection 10 Units 0.1 mL (10 Units Intravenous Given 2/2/23 1923)           * No surgery found *      Hospital Course:   57M with pmh of HTN, IV heroin abuse, DM1 with DKA, Hep C, pancreatitis, and COPD admitted due to dka. CXR showed no acute intrathoracic abnormality detected.  CT head showed no acute findings.  Chronic changes with ventricular enlargement, as above.  Please correlate for normal pressure hydrocephalus.  No adverse change from 08/10/2022.  CT abdomen and pelvis showed no acute findings.  Moderately large colonic stool.  Question constipation.  Small fluid in the distal esophagus.  Findings may suggest esophageal dysmotility issue or reflux.  Questionable scarring at the right lung base. The pt was started on iv insulin and initially admitted to the icu. Pts gap closed and he was transitioned to subq insulin. Pt transferred out to the floor. The following morning pt asking to be d/gifty and stating he is feeling better. Pt transferred to psychiatric facility for further evaluation on home insulin regimen.          Goals of Care Treatment Preferences:  Code Status: Full Code      Consults:   Consults (From admission, onward)        Status  Ordering Provider     Inpatient consult to Psychiatry  Once        Provider:  Mahamed Menendez MD    Completed PAWEL LERNER     Inpatient consult to Registered Dietitian/Nutritionist  Once        Provider:  (Not yet assigned)    Completed PAWEL LERNER          No new Assessment & Plan notes have been filed under this hospital service since the last note was generated.  Service: Hospital Medicine    Final Active Diagnoses:    Diagnosis Date Noted POA    PRINCIPAL PROBLEM:  Diabetic ketoacidosis with coma associated with type 1 diabetes mellitus [E10.11] 11/24/2021 Yes    Amphetamine use disorder, severe, dependence [F15.20] 09/26/2022 Yes    Substance induced mood disorder [F19.94] 09/19/2022 Yes    Noncompliance with medication regimen [Z91.14] 09/17/2022 Not Applicable    RAMYA (acute kidney injury) [N17.9] 03/07/2022 Yes    COPD (chronic obstructive pulmonary disease) [J44.9] 08/08/2020 Yes    History of heroin abuse [F11.11] 04/23/2020 Yes    Hyperkalemia [E87.5] 04/13/2019 Yes    Chronic hepatitis C [B18.2] 11/23/2017 Yes     Chronic    Tobacco use disorder, moderate, dependence [F17.200] 11/23/2017 Yes     Chronic    Chronic relapsing pancreatitis [K86.1] 11/23/2017 Yes     Chronic      Problems Resolved During this Admission:    Diagnosis Date Noted Date Resolved POA    DKA (diabetic ketoacidosis) [E11.10] 02/02/2023 02/02/2023 Unknown    Essential hypertension [I10] 04/03/2016 02/03/2023 Yes     Chronic       Discharged Condition: good    Disposition: Psychiatric Hospital    Follow Up:    Patient Instructions:      Ambulatory referral/consult to Endocrinology   Standing Status: Future   Referral Priority: Routine Referral Type: Consultation   Requested Specialty: Endocrinology   Number of Visits Requested: 1     Diet diabetic     Notify your health care provider if you experience any of the following:  temperature >100.4       Significant Diagnostic Studies: Labs: All labs within the  past 24 hours have been reviewed    Pending Diagnostic Studies:     None         Medications:  Reconciled Home Medications:      Medication List      CHANGE how you take these medications    insulin detemir U-100 100 unit/mL (3 mL) Inpn pen  Commonly known as: Levemir FLEXTOUCH  Inject 20 Units into the skin every evening.  What changed: how much to take        CONTINUE taking these medications    aspirin 81 MG Chew  Take 1 tablet (81 mg total) by mouth once daily.     cetirizine 10 MG tablet  Commonly known as: ZYRTEC  Take 1 tablet (10 mg total) by mouth once daily.     gabapentin 400 MG capsule  Commonly known as: NEURONTIN  Take 400 mg by mouth 3 (three) times daily.     insulin lispro 100 unit/mL pen  Inject 7 Units into the skin 3 (three) times daily with meals.     naloxone 4 mg/actuation Spry  Commonly known as: NARCAN  4mg by nasal route as needed for opioid overdose; may repeat every 2-3 minutes in alternating nostrils until medical help arrives. Call 911     nitroGLYCERIN 0.4 MG SL tablet  Commonly known as: NITROSTAT  Place 1 tablet (0.4 mg total) under the tongue every 5 (five) minutes as needed for Chest pain.        STOP taking these medications    dextromethorphan 15 mg/5 mL syrup     TRESIBA FLEXTOUCH U-200 200 unit/mL (3 mL) insulin pen  Generic drug: insulin degludec        ASK your doctor about these medications    atorvastatin 20 MG tablet  Commonly known as: LIPITOR  Take 1 tablet (20 mg total) by mouth once daily.            Indwelling Lines/Drains at time of discharge:   Lines/Drains/Airways     None                 Time spent on the discharge of patient: >35 minutes         Chapito Graham III, MD  Department of Hospital Medicine  [facility]

## 2023-02-06 NOTE — HOSPITAL COURSE
57M with pmh of HTN, IV heroin abuse, DM1 with DKA, Hep C, pancreatitis, and COPD admitted due to dka. CXR showed no acute intrathoracic abnormality detected.  CT head showed no acute findings.  Chronic changes with ventricular enlargement, as above.  Please correlate for normal pressure hydrocephalus.  No adverse change from 08/10/2022.  CT abdomen and pelvis showed no acute findings.  Moderately large colonic stool.  Question constipation.  Small fluid in the distal esophagus.  Findings may suggest esophageal dysmotility issue or reflux.  Questionable scarring at the right lung base. The pt was started on iv insulin and initially admitted to the icu. Pts gap closed and he was transitioned to subq insulin. Pt transferred out to the floor. The following morning pt asking to be d/gifty and stating he is feeling better. Pt transferred to psychiatric facility for further evaluation on home insulin regimen.

## 2023-02-09 NOTE — TELEPHONE ENCOUNTER
I spoke with patient's mom Marilou using an  ([place]  # [id number]).  Patient scheduled to see PA Scheuermann on 3/16/23; appt reminder notice mailed.  Mom asked that I contact Marbella (niece who is an  [phone]) to relay appointment info.  She states that Marbella will come to scheduled appt with patient.  I spoke with Marbella.  She states that patient is currently in a psychiatric facility but should be able to come for scheduled appt.

## 2023-02-10 NOTE — PHYSICIAN QUERY
"PT Name: Cali Mabry  MR #: [id number]    DOCUMENTATION CLARIFICATION     CDS/: Gera Gutierrez, RITA, CCDS               Contact information:   [email or web address]   This form is a permanent document in the medical record.     Query Date: February 10, 2023    By submitting this query, we are merely seeking further clarification of documentation. Please utilize your independent clinical judgment when addressing the question(s) below.    The Medical Record contains the following:   Indicators   Supporting Clinical Findings Location in Medical Record   x AMS, Confusion,  LOC, etc.  2 ED RN: Pt. Gets verbally abusive when asked a lot of questions. Pt CBG greater than 500. Pt arrived by EMS.  upper abdomen is hurting 9/10 pain. Patient has associated symptoms of Nausea/vomiting    2/3 ED RN: extremely verbally abusive to RN, refusing blood draw, screaming at RN yelling "I don't fucking care about the blood, leave me the fuck alone  I don't give a god damn about you nurse" and "I JUST WANT TO GO HOME, JUST LET ME GO HOME AND DIE".     2/3 HM:  He is lethargic and disoriented. Mood is depressed.       Speech: He is noncommunicative. Speech is delayed.       Behavior: Behavior is uncooperative, slowed and withdrawn.       I have PEC'd Mr. Mabry for violent, agitated and suicidal ideation behavior.  He is screaming, cursing, highly agitated and states, "I don't care if I go home and pass away." ;  I feel he is still too confused from his encephalopathy to have capacity to make medical decisions     ED RN          3 ED RN            2/3  [facility] med ()   x Acute/Chronic Illness Diabetic ketoacidosis with coma associated with type 1 diabetes mellitus;  RAMYA;   hyperkalemia; History of heroin abuse; ...   2/3 H&P;        x Radiology Findings No acute findings. 2/ CT head     x Electrolyte Imbalance  glucose : 786;   Na: 132;  potassium: 6.3;    CO2: 6;  A;    Labs     Medication      " Treatment             x Other Toxicology: amphetamine positive 2/2     The noted clinical guidelines are only system guidelines and do not replace the providers clinical judgment.    The National [place] of Neurologic Disorders and Stroke (NINDS) of the NIH describes encephalopathy as any diffuse disease of the brain that alters brain function or structure.    Provider, please specify the encephalopathy diagnosis associated with above clinical findings.  Jose all that apply:   [  x ] Metabolic Encephalopathy - Due to electrolyte imbalance, metabolic derangements, or infectious processes, includes Septic Encephalopathy, Uremic Encephalopathy   [   ] Toxic Encephalopathy - Due to drugs, chemicals, or other toxic substances   [   ] Other Encephalopathy (please specify): ____________________   [   ]  Clinically Undetermined           Please document in your progress notes daily for the duration of treatment until resolved, and include in your discharge summary.    References:  LEONILA Garcia RN, CCDS. (2018, June 9). Notes from the Instructor: Encephalopathy tips. Retrieved October 22, 2020, from [email or web address]    ICD-9-CM Coding Clinic First Quarter 2013, Effective with discharges: October 21, 2013 [facility] § Seizure with encephalopathy due to postictal state (2013).    ICD-10-CM/PCS [employer] Integrated Codebook (Version V.20.8.10.0) [Computer software]. (2020). Retrieved October 21, 2020.    National [place] of Neurological Disorders and Stroke. (2019, March 27). Retrieved October 22, 2020, from https://www.ninds.nih.gov/Disorders/All-Disorders/Cnqnubxnsacgzt-Bwvrmmdciig-Ijtj    Form No. 42651

## 2023-03-16 ENCOUNTER — OFFICE VISIT (OUTPATIENT)
Dept: HEPATOLOGY | Facility: CLINIC | Age: 58
End: 2023-03-16
Payer: MEDICARE

## 2023-03-16 VITALS — WEIGHT: 135.81 LBS | BODY MASS INDEX: 20.58 KG/M2 | HEIGHT: 68 IN

## 2023-03-16 DIAGNOSIS — B18.2 CHRONIC HEPATITIS C WITHOUT HEPATIC COMA: Chronic | ICD-10-CM

## 2023-03-16 DIAGNOSIS — Z86.19 HISTORY OF HEPATITIS C: Primary | ICD-10-CM

## 2023-03-16 PROCEDURE — 1160F PR REVIEW ALL MEDS BY PRESCRIBER/CLIN PHARMACIST DOCUMENTED: ICD-10-PCS | Mod: CPTII,S$GLB,, | Performed by: PHYSICIAN ASSISTANT

## 2023-03-16 PROCEDURE — 3046F HEMOGLOBIN A1C LEVEL >9.0%: CPT | Mod: CPTII,S$GLB,, | Performed by: PHYSICIAN ASSISTANT

## 2023-03-16 PROCEDURE — 1159F PR MEDICATION LIST DOCUMENTED IN MEDICAL RECORD: ICD-10-PCS | Mod: CPTII,S$GLB,, | Performed by: PHYSICIAN ASSISTANT

## 2023-03-16 PROCEDURE — 3008F PR BODY MASS INDEX (BMI) DOCUMENTED: ICD-10-PCS | Mod: CPTII,S$GLB,, | Performed by: PHYSICIAN ASSISTANT

## 2023-03-16 PROCEDURE — 1159F MED LIST DOCD IN RCRD: CPT | Mod: CPTII,S$GLB,, | Performed by: PHYSICIAN ASSISTANT

## 2023-03-16 PROCEDURE — 99203 PR OFFICE/OUTPT VISIT, NEW, LEVL III, 30-44 MIN: ICD-10-PCS | Mod: S$GLB,,, | Performed by: PHYSICIAN ASSISTANT

## 2023-03-16 PROCEDURE — 99999 PR PBB SHADOW E&M-EST. PATIENT-LVL III: CPT | Mod: PBBFAC,,, | Performed by: PHYSICIAN ASSISTANT

## 2023-03-16 PROCEDURE — 3046F PR MOST RECENT HEMOGLOBIN A1C LEVEL > 9.0%: ICD-10-PCS | Mod: CPTII,S$GLB,, | Performed by: PHYSICIAN ASSISTANT

## 2023-03-16 PROCEDURE — 1160F RVW MEDS BY RX/DR IN RCRD: CPT | Mod: CPTII,S$GLB,, | Performed by: PHYSICIAN ASSISTANT

## 2023-03-16 PROCEDURE — 99999 PR PBB SHADOW E&M-EST. PATIENT-LVL III: ICD-10-PCS | Mod: PBBFAC,,, | Performed by: PHYSICIAN ASSISTANT

## 2023-03-16 PROCEDURE — 3008F BODY MASS INDEX DOCD: CPT | Mod: CPTII,S$GLB,, | Performed by: PHYSICIAN ASSISTANT

## 2023-03-16 PROCEDURE — 99203 OFFICE O/P NEW LOW 30 MIN: CPT | Mod: S$GLB,,, | Performed by: PHYSICIAN ASSISTANT

## 2023-03-16 RX ORDER — QUETIAPINE FUMARATE 100 MG/1
100 TABLET, FILM COATED ORAL NIGHTLY
Status: ON HOLD | COMMUNITY
Start: 2023-02-09 | End: 2023-08-22 | Stop reason: HOSPADM

## 2023-03-16 RX ORDER — SERTRALINE HYDROCHLORIDE 25 MG/1
25 TABLET, FILM COATED ORAL 2 TIMES DAILY
Status: ON HOLD | COMMUNITY
Start: 2023-02-09 | End: 2023-08-22 | Stop reason: HOSPADM

## 2023-03-16 RX ORDER — LORATADINE 10 MG/1
10 TABLET ORAL
Status: ON HOLD | COMMUNITY
Start: 2023-02-09 | End: 2024-03-11 | Stop reason: SDUPTHER

## 2023-03-16 NOTE — PROGRESS NOTES
"HEPATOLOGY CLINIC VISIT NOTE - HCV clinic  REFERRING PROVIDER: Jose Mitchell MD  CHIEF COMPLAINT: Hepatitis C   (accompanied by: mother)    HISTORY       This is a 57 y.o. White male referred for HCV from hospitalization late Jan for DKA. Ultimately pt left AMA after 2d. Appears that while inpt old labs from 2020 were reviewed in EPIC revealing (+) HCV RNA. Repeat HCV RNA was ordered 1/28/23 but pending at time of hospital discharge; has actually resulted negative.    Pt denies known hx of HCV Rx  States "it's a miracle"  Labs 2020 - 2023 reveal persistently normal liver panel  (+) risks for HCV: drug use (appears to be ongoing), tattoos    Feels okay today  (+) back pain  Ambulates w/ cane    PMH, PSH, SOCIAL HX, FAMILY HX      Reviewed in Epic  Pertinent findings:  FAMILY HX: neg for liver diease      ROS: as per HPI      PHYSICAL EXAM:  Friendly White male, in no acute distress; alert and oriented to person, place and time  HEENT: Sclerae anicteric.   NECK: Supple  LUNGS: Normal respiratory effort.   ABDOMEN: Flat, soft, nontender.   SKIN: Warm and dry. No jaundice, No obvious rashes.   EXTREMITIES: No lower extremity edema  NEURO/PSYCH: ambulates w/ cane. Memory intact. Thought and speech pattern appropriate. No depression or anxiety noted.    PERTINENT DIAGNOSTIC RESULTS      Lab Results   Component Value Date    WBC 6.21 02/04/2023    HGB 11.9 (L) 02/04/2023     02/04/2023     Lab Results   Component Value Date    INR 0.9 08/07/2020     Lab Results   Component Value Date    AST 22 02/02/2023    ALT 20 02/02/2023    BILITOT 0.4 02/02/2023    ALBUMIN 4.0 02/02/2023    ALKPHOS 133 02/02/2023    CREATININE 1.2 02/04/2023    BUN 21 (H) 02/04/2023     (L) 02/04/2023    K 4.5 02/04/2023         ASSESSMENT        57 y.o. White male with:  HISTORY OF HEPATITIS C  - (+) risks  - (+) HCV RNA 2020 --> (-) HCV RNA 2023.  Pt does not recall treatment; possibly cleared on his own        PLAN        1. Labs   Orders " Placed This Encounter   Procedures    Hepatitis C RNA, Quantitative, PCR    Hepatitis B Surface Antigen    HIV 1/2 Ag/Ab (4th Gen)     If HCV RNA neg, recommend repeating one more time in 3 months. If neg, no f/u needed.       __________________________________________________________________    Duration of encounter: 30 min  This includes face-to-face time and non face-to-face time preparing to see the patient (eg, review of tests), obtaining and/or reviewing separately obtained history, documenting clinical information in the electronic or other health record, independently interpreting resultsand communicating results to the patient/family/caregiver, or care coordination.

## 2023-03-26 ENCOUNTER — TELEPHONE (OUTPATIENT)
Dept: HEPATOLOGY | Facility: CLINIC | Age: 58
End: 2023-03-26
Payer: MEDICARE

## 2023-03-27 DIAGNOSIS — Z86.19 HISTORY OF HEPATITIS C: Primary | ICD-10-CM

## 2023-03-27 NOTE — TELEPHONE ENCOUNTER
3/22/23 labs  HCV RNA Neg  HCVAB (+)  HBsAb neg  HIV neg    *I ordered HBsAg. Appears HBsAb was done instead.      Pls call pt  Labs show NO evidence of Hep C in his body. Again appears he has cleared his Hep C infection his own. We will check one more time in 3 months.  Lab to screen for HIV was also negative    Pls schedule in 3 months: HCV RNA, HBsAg    thanks

## 2023-03-27 NOTE — TELEPHONE ENCOUNTER
I contacted [employer] and spoke with Zakiya.  Hep Bs Ag added to 3/22/23 blood draw.  Attempt made to reach patient.  Msg from PA Scheuermann left on his VM.  Order mailed to him to have HCV RNA drawn on 6/27/23.

## 2023-03-29 LAB
CLIENT CONTACT: NORMAL
COMMENT: NORMAL
HBV SURFACE AB SER QL IA: NORMAL
HBV SURFACE AG SERPL QL IA: NORMAL
HCV AB S/CO SERPL IA: >11
HCV AB SERPL QL IA: REACTIVE
HCV RNA SERPL NAA+PROBE-ACNC: ABNORMAL IU/ML
HCV RNA SERPL NAA+PROBE-LOG IU: ABNORMAL LOG IU/ML
HIV 1+2 AB+HIV1 P24 AG SERPL QL IA: NORMAL
REF LAB TEST NAME: NORMAL
REF LAB TEST: NORMAL
REPORT ALWAYS MESSAGE SIGNATURE: NORMAL

## 2023-04-07 ENCOUNTER — HOSPITAL ENCOUNTER (OUTPATIENT)
Facility: HOSPITAL | Age: 58
Discharge: LEFT BEFORE DISCHARGE | End: 2023-04-08
Attending: STUDENT IN AN ORGANIZED HEALTH CARE EDUCATION/TRAINING PROGRAM | Admitting: STUDENT IN AN ORGANIZED HEALTH CARE EDUCATION/TRAINING PROGRAM
Payer: MEDICARE

## 2023-04-07 DIAGNOSIS — R07.9 CHEST PAIN: ICD-10-CM

## 2023-04-07 DIAGNOSIS — E16.2 HYPOGLYCEMIA: ICD-10-CM

## 2023-04-07 DIAGNOSIS — M25.552 LEFT HIP PAIN: ICD-10-CM

## 2023-04-07 LAB
ALBUMIN SERPL BCP-MCNC: 3.8 G/DL (ref 3.5–5.2)
ALP SERPL-CCNC: 86 U/L (ref 55–135)
ALT SERPL W/O P-5'-P-CCNC: 11 U/L (ref 10–44)
AMPHET+METHAMPHET UR QL: ABNORMAL
ANION GAP SERPL CALC-SCNC: 10 MMOL/L (ref 8–16)
APAP SERPL-MCNC: <3 UG/ML (ref 10–20)
AST SERPL-CCNC: 19 U/L (ref 10–40)
BACTERIA #/AREA URNS HPF: NORMAL /HPF
BARBITURATES UR QL SCN>200 NG/ML: NEGATIVE
BASOPHILS # BLD AUTO: 0.02 K/UL (ref 0–0.2)
BASOPHILS NFR BLD: 0.2 % (ref 0–1.9)
BENZODIAZ UR QL SCN>200 NG/ML: NEGATIVE
BILIRUB SERPL-MCNC: 0.3 MG/DL (ref 0.1–1)
BILIRUB UR QL STRIP: NEGATIVE
BUN SERPL-MCNC: 13 MG/DL (ref 6–20)
BZE UR QL SCN: NEGATIVE
CALCIUM SERPL-MCNC: 8.5 MG/DL (ref 8.7–10.5)
CANNABINOIDS UR QL SCN: NEGATIVE
CHLORIDE SERPL-SCNC: 102 MMOL/L (ref 95–110)
CLARITY UR: CLEAR
CO2 SERPL-SCNC: 23 MMOL/L (ref 23–29)
COLOR UR: COLORLESS
CREAT SERPL-MCNC: 0.9 MG/DL (ref 0.5–1.4)
CREAT UR-MCNC: 34.6 MG/DL (ref 23–375)
DIFFERENTIAL METHOD: ABNORMAL
EOSINOPHIL # BLD AUTO: 0.2 K/UL (ref 0–0.5)
EOSINOPHIL NFR BLD: 1.7 % (ref 0–8)
ERYTHROCYTE [DISTWIDTH] IN BLOOD BY AUTOMATED COUNT: 14.9 % (ref 11.5–14.5)
EST. GFR  (NO RACE VARIABLE): >60 ML/MIN/1.73 M^2
ETHANOL SERPL-MCNC: <10 MG/DL
GLUCOSE SERPL-MCNC: 103 MG/DL (ref 70–110)
GLUCOSE UR QL STRIP: NEGATIVE
HCT VFR BLD AUTO: 37.5 % (ref 40–54)
HGB BLD-MCNC: 11.9 G/DL (ref 14–18)
HGB UR QL STRIP: NEGATIVE
HYALINE CASTS #/AREA URNS LPF: 1 /LPF
IMM GRANULOCYTES # BLD AUTO: 0.05 K/UL (ref 0–0.04)
IMM GRANULOCYTES NFR BLD AUTO: 0.6 % (ref 0–0.5)
KETONES UR QL STRIP: NEGATIVE
LEUKOCYTE ESTERASE UR QL STRIP: NEGATIVE
LYMPHOCYTES # BLD AUTO: 0.9 K/UL (ref 1–4.8)
LYMPHOCYTES NFR BLD: 10.1 % (ref 18–48)
MAGNESIUM SERPL-MCNC: 1.6 MG/DL (ref 1.6–2.6)
MCH RBC QN AUTO: 26.7 PG (ref 27–31)
MCHC RBC AUTO-ENTMCNC: 31.7 G/DL (ref 32–36)
MCV RBC AUTO: 84 FL (ref 82–98)
METHADONE UR QL SCN>300 NG/ML: NEGATIVE
MICROSCOPIC COMMENT: NORMAL
MONOCYTES # BLD AUTO: 0.6 K/UL (ref 0.3–1)
MONOCYTES NFR BLD: 7.3 % (ref 4–15)
NEUTROPHILS # BLD AUTO: 6.9 K/UL (ref 1.8–7.7)
NEUTROPHILS NFR BLD: 80.1 % (ref 38–73)
NITRITE UR QL STRIP: NEGATIVE
NRBC BLD-RTO: 0 /100 WBC
OPIATES UR QL SCN: NEGATIVE
PCP UR QL SCN>25 NG/ML: NEGATIVE
PH UR STRIP: 7 [PH] (ref 5–8)
PLATELET # BLD AUTO: 258 K/UL (ref 150–450)
PMV BLD AUTO: 9.2 FL (ref 9.2–12.9)
POCT GLUCOSE: 65 MG/DL (ref 70–110)
POCT GLUCOSE: 79 MG/DL (ref 70–110)
POTASSIUM SERPL-SCNC: 3.4 MMOL/L (ref 3.5–5.1)
PROT SERPL-MCNC: 7.5 G/DL (ref 6–8.4)
PROT UR QL STRIP: ABNORMAL
RBC # BLD AUTO: 4.46 M/UL (ref 4.6–6.2)
RBC #/AREA URNS HPF: 2 /HPF (ref 0–4)
SODIUM SERPL-SCNC: 135 MMOL/L (ref 136–145)
SP GR UR STRIP: 1.01 (ref 1–1.03)
TOXICOLOGY INFORMATION: ABNORMAL
URN SPEC COLLECT METH UR: ABNORMAL
UROBILINOGEN UR STRIP-ACNC: NEGATIVE EU/DL
WBC # BLD AUTO: 8.67 K/UL (ref 3.9–12.7)
WBC #/AREA URNS HPF: 0 /HPF (ref 0–5)

## 2023-04-07 PROCEDURE — 85025 COMPLETE CBC W/AUTO DIFF WBC: CPT | Performed by: STUDENT IN AN ORGANIZED HEALTH CARE EDUCATION/TRAINING PROGRAM

## 2023-04-07 PROCEDURE — 80307 DRUG TEST PRSMV CHEM ANLYZR: CPT | Performed by: STUDENT IN AN ORGANIZED HEALTH CARE EDUCATION/TRAINING PROGRAM

## 2023-04-07 PROCEDURE — 93005 ELECTROCARDIOGRAM TRACING: CPT

## 2023-04-07 PROCEDURE — 96374 THER/PROPH/DIAG INJ IV PUSH: CPT | Mod: 59

## 2023-04-07 PROCEDURE — 82962 GLUCOSE BLOOD TEST: CPT

## 2023-04-07 PROCEDURE — 81000 URINALYSIS NONAUTO W/SCOPE: CPT | Mod: 59 | Performed by: STUDENT IN AN ORGANIZED HEALTH CARE EDUCATION/TRAINING PROGRAM

## 2023-04-07 PROCEDURE — 93010 ELECTROCARDIOGRAM REPORT: CPT | Mod: ,,, | Performed by: INTERNAL MEDICINE

## 2023-04-07 PROCEDURE — 99291 CRITICAL CARE FIRST HOUR: CPT

## 2023-04-07 PROCEDURE — 80143 DRUG ASSAY ACETAMINOPHEN: CPT | Performed by: STUDENT IN AN ORGANIZED HEALTH CARE EDUCATION/TRAINING PROGRAM

## 2023-04-07 PROCEDURE — 99285 EMERGENCY DEPT VISIT HI MDM: CPT | Mod: 25

## 2023-04-07 PROCEDURE — 83735 ASSAY OF MAGNESIUM: CPT | Performed by: STUDENT IN AN ORGANIZED HEALTH CARE EDUCATION/TRAINING PROGRAM

## 2023-04-07 PROCEDURE — 80053 COMPREHEN METABOLIC PANEL: CPT | Performed by: STUDENT IN AN ORGANIZED HEALTH CARE EDUCATION/TRAINING PROGRAM

## 2023-04-07 PROCEDURE — 82077 ASSAY SPEC XCP UR&BREATH IA: CPT | Mod: 59 | Performed by: STUDENT IN AN ORGANIZED HEALTH CARE EDUCATION/TRAINING PROGRAM

## 2023-04-07 PROCEDURE — 93010 EKG 12-LEAD: ICD-10-PCS | Mod: ,,, | Performed by: INTERNAL MEDICINE

## 2023-04-07 RX ORDER — ONDANSETRON 2 MG/ML
4 INJECTION INTRAMUSCULAR; INTRAVENOUS
Status: COMPLETED | OUTPATIENT
Start: 2023-04-07 | End: 2023-04-08

## 2023-04-07 RX ORDER — MORPHINE SULFATE 4 MG/ML
4 INJECTION, SOLUTION INTRAMUSCULAR; INTRAVENOUS
Status: COMPLETED | OUTPATIENT
Start: 2023-04-07 | End: 2023-04-08

## 2023-04-07 RX ADMIN — DEXTROSE MONOHYDRATE 250 ML: 100 INJECTION, SOLUTION INTRAVENOUS at 11:04

## 2023-04-07 NOTE — Clinical Note
Diagnosis: Hypoglycemia [215126]   Future Attending Provider: SILVINA ORTEGA [[id number]]   Admitting Provider:: SILVINA ORTEGA [[id number]]

## 2023-04-08 VITALS
HEIGHT: 68 IN | HEART RATE: 85 BPM | RESPIRATION RATE: 18 BRPM | TEMPERATURE: 98 F | SYSTOLIC BLOOD PRESSURE: 110 MMHG | BODY MASS INDEX: 19.12 KG/M2 | DIASTOLIC BLOOD PRESSURE: 71 MMHG | OXYGEN SATURATION: 99 % | WEIGHT: 126.13 LBS

## 2023-04-08 PROBLEM — M25.532 LEFT WRIST PAIN: Status: ACTIVE | Noted: 2023-04-08

## 2023-04-08 PROBLEM — E87.5 HYPERKALEMIA: Status: RESOLVED | Noted: 2019-04-13 | Resolved: 2023-04-08

## 2023-04-08 PROBLEM — E10.11 DIABETIC KETOACIDOSIS WITH COMA ASSOCIATED WITH TYPE 1 DIABETES MELLITUS: Status: RESOLVED | Noted: 2021-11-24 | Resolved: 2023-04-08

## 2023-04-08 PROBLEM — E10.10 DKA, TYPE 1: Status: RESOLVED | Noted: 2019-04-13 | Resolved: 2023-04-08

## 2023-04-08 PROBLEM — F19.10 INTRAVENOUS DRUG ABUSE: Chronic | Status: RESOLVED | Noted: 2017-11-23 | Resolved: 2023-04-08

## 2023-04-08 LAB
POCT GLUCOSE: 101 MG/DL (ref 70–110)
POCT GLUCOSE: 127 MG/DL (ref 70–110)
POCT GLUCOSE: 133 MG/DL (ref 70–110)
POCT GLUCOSE: 144 MG/DL (ref 70–110)
POCT GLUCOSE: 150 MG/DL (ref 70–110)
POCT GLUCOSE: 202 MG/DL (ref 70–110)
POCT GLUCOSE: 364 MG/DL (ref 70–110)
POCT GLUCOSE: 37 MG/DL (ref 70–110)
POCT GLUCOSE: 85 MG/DL (ref 70–110)

## 2023-04-08 PROCEDURE — 96365 THER/PROPH/DIAG IV INF INIT: CPT

## 2023-04-08 PROCEDURE — 96372 THER/PROPH/DIAG INJ SC/IM: CPT | Performed by: STUDENT IN AN ORGANIZED HEALTH CARE EDUCATION/TRAINING PROGRAM

## 2023-04-08 PROCEDURE — G0378 HOSPITAL OBSERVATION PER HR: HCPCS

## 2023-04-08 PROCEDURE — 82962 GLUCOSE BLOOD TEST: CPT

## 2023-04-08 PROCEDURE — 25000003 PHARM REV CODE 250: Performed by: STUDENT IN AN ORGANIZED HEALTH CARE EDUCATION/TRAINING PROGRAM

## 2023-04-08 PROCEDURE — 96376 TX/PRO/DX INJ SAME DRUG ADON: CPT

## 2023-04-08 PROCEDURE — 25000003 PHARM REV CODE 250

## 2023-04-08 PROCEDURE — S0166 INJ OLANZAPINE 2.5MG: HCPCS | Performed by: STUDENT IN AN ORGANIZED HEALTH CARE EDUCATION/TRAINING PROGRAM

## 2023-04-08 PROCEDURE — 63600175 PHARM REV CODE 636 W HCPCS: Performed by: STUDENT IN AN ORGANIZED HEALTH CARE EDUCATION/TRAINING PROGRAM

## 2023-04-08 PROCEDURE — 96375 TX/PRO/DX INJ NEW DRUG ADDON: CPT

## 2023-04-08 RX ORDER — NALOXONE HCL 0.4 MG/ML
0.02 VIAL (ML) INJECTION
Status: DISCONTINUED | OUTPATIENT
Start: 2023-04-08 | End: 2023-04-09 | Stop reason: HOSPADM

## 2023-04-08 RX ORDER — KETOROLAC TROMETHAMINE 30 MG/ML
15 INJECTION, SOLUTION INTRAMUSCULAR; INTRAVENOUS EVERY 6 HOURS PRN
Status: DISCONTINUED | OUTPATIENT
Start: 2023-04-08 | End: 2023-04-09 | Stop reason: HOSPADM

## 2023-04-08 RX ORDER — SERTRALINE HYDROCHLORIDE 50 MG/1
50 TABLET, FILM COATED ORAL DAILY
Status: DISCONTINUED | OUTPATIENT
Start: 2023-04-08 | End: 2023-04-09 | Stop reason: HOSPADM

## 2023-04-08 RX ORDER — POLYETHYLENE GLYCOL 3350 17 G/17G
17 POWDER, FOR SOLUTION ORAL DAILY
Status: DISCONTINUED | OUTPATIENT
Start: 2023-04-08 | End: 2023-04-09 | Stop reason: HOSPADM

## 2023-04-08 RX ORDER — GLUCAGON 1 MG
1 KIT INJECTION
Status: DISCONTINUED | OUTPATIENT
Start: 2023-04-08 | End: 2023-04-09 | Stop reason: HOSPADM

## 2023-04-08 RX ORDER — DEXTROSE MONOHYDRATE 100 MG/ML
INJECTION, SOLUTION INTRAVENOUS
Status: COMPLETED | OUTPATIENT
Start: 2023-04-08 | End: 2023-04-08

## 2023-04-08 RX ORDER — ATORVASTATIN CALCIUM 10 MG/1
20 TABLET, FILM COATED ORAL DAILY
Status: DISCONTINUED | OUTPATIENT
Start: 2023-04-08 | End: 2023-04-09 | Stop reason: HOSPADM

## 2023-04-08 RX ORDER — MAG HYDROX/ALUMINUM HYD/SIMETH 200-200-20
30 SUSPENSION, ORAL (FINAL DOSE FORM) ORAL 4 TIMES DAILY PRN
Status: DISCONTINUED | OUTPATIENT
Start: 2023-04-08 | End: 2023-04-09 | Stop reason: HOSPADM

## 2023-04-08 RX ORDER — QUETIAPINE FUMARATE 100 MG/1
100 TABLET, FILM COATED ORAL NIGHTLY
Status: DISCONTINUED | OUTPATIENT
Start: 2023-04-08 | End: 2023-04-09 | Stop reason: HOSPADM

## 2023-04-08 RX ORDER — DEXTROSE MONOHYDRATE 100 MG/ML
INJECTION, SOLUTION INTRAVENOUS CONTINUOUS
Status: DISCONTINUED | OUTPATIENT
Start: 2023-04-08 | End: 2023-04-09 | Stop reason: HOSPADM

## 2023-04-08 RX ORDER — DEXTROSE 40 %
15 GEL (GRAM) ORAL
Status: DISCONTINUED | OUTPATIENT
Start: 2023-04-08 | End: 2023-04-09 | Stop reason: HOSPADM

## 2023-04-08 RX ORDER — GABAPENTIN 400 MG/1
400 CAPSULE ORAL 3 TIMES DAILY
Status: DISCONTINUED | OUTPATIENT
Start: 2023-04-08 | End: 2023-04-09 | Stop reason: HOSPADM

## 2023-04-08 RX ORDER — ACETAMINOPHEN 500 MG
1000 TABLET ORAL
Status: COMPLETED | OUTPATIENT
Start: 2023-04-08 | End: 2023-04-08

## 2023-04-08 RX ORDER — NAPROXEN SODIUM 220 MG/1
81 TABLET, FILM COATED ORAL DAILY
Status: DISCONTINUED | OUTPATIENT
Start: 2023-04-08 | End: 2023-04-09 | Stop reason: HOSPADM

## 2023-04-08 RX ORDER — DEXTROSE 50 % IN WATER (D50W) INTRAVENOUS SYRINGE
25
Status: COMPLETED | OUTPATIENT
Start: 2023-04-08 | End: 2023-04-08

## 2023-04-08 RX ORDER — DEXTROSE 40 %
30 GEL (GRAM) ORAL
Status: DISCONTINUED | OUTPATIENT
Start: 2023-04-08 | End: 2023-04-09 | Stop reason: HOSPADM

## 2023-04-08 RX ORDER — TALC
6 POWDER (GRAM) TOPICAL NIGHTLY PRN
Status: DISCONTINUED | OUTPATIENT
Start: 2023-04-08 | End: 2023-04-09 | Stop reason: HOSPADM

## 2023-04-08 RX ORDER — OLANZAPINE 10 MG/2ML
10 INJECTION, POWDER, FOR SOLUTION INTRAMUSCULAR EVERY 12 HOURS PRN
Status: DISCONTINUED | OUTPATIENT
Start: 2023-04-08 | End: 2023-04-09 | Stop reason: HOSPADM

## 2023-04-08 RX ORDER — DEXTROSE MONOHYDRATE AND SODIUM CHLORIDE 5; .9 G/100ML; G/100ML
INJECTION, SOLUTION INTRAVENOUS CONTINUOUS
Status: DISCONTINUED | OUTPATIENT
Start: 2023-04-08 | End: 2023-04-08

## 2023-04-08 RX ORDER — INSULIN LISPRO 100 [IU]/ML
7 INJECTION, SOLUTION INTRAVENOUS; SUBCUTANEOUS
Qty: 15 ML | Refills: 2 | Status: ON HOLD | OUTPATIENT
Start: 2023-04-08 | End: 2023-08-30 | Stop reason: HOSPADM

## 2023-04-08 RX ORDER — ONDANSETRON 8 MG/1
8 TABLET, ORALLY DISINTEGRATING ORAL EVERY 8 HOURS PRN
Status: DISCONTINUED | OUTPATIENT
Start: 2023-04-08 | End: 2023-04-09 | Stop reason: HOSPADM

## 2023-04-08 RX ORDER — ACETAMINOPHEN 325 MG/1
650 TABLET ORAL EVERY 4 HOURS PRN
Status: DISCONTINUED | OUTPATIENT
Start: 2023-04-08 | End: 2023-04-09 | Stop reason: HOSPADM

## 2023-04-08 RX ORDER — PROCHLORPERAZINE EDISYLATE 5 MG/ML
5 INJECTION INTRAMUSCULAR; INTRAVENOUS EVERY 6 HOURS PRN
Status: DISCONTINUED | OUTPATIENT
Start: 2023-04-08 | End: 2023-04-09 | Stop reason: HOSPADM

## 2023-04-08 RX ADMIN — GABAPENTIN 400 MG: 400 CAPSULE ORAL at 02:04

## 2023-04-08 RX ADMIN — ATORVASTATIN CALCIUM 20 MG: 10 TABLET, FILM COATED ORAL at 08:04

## 2023-04-08 RX ADMIN — DEXTROSE MONOHYDRATE 25 G: 25 INJECTION, SOLUTION INTRAVENOUS at 03:04

## 2023-04-08 RX ADMIN — GABAPENTIN 400 MG: 400 CAPSULE ORAL at 08:04

## 2023-04-08 RX ADMIN — OLANZAPINE 10 MG: 10 INJECTION, POWDER, FOR SOLUTION INTRAMUSCULAR at 04:04

## 2023-04-08 RX ADMIN — ACETAMINOPHEN 1000 MG: 500 TABLET ORAL at 01:04

## 2023-04-08 RX ADMIN — QUETIAPINE FUMARATE 100 MG: 100 TABLET ORAL at 04:04

## 2023-04-08 RX ADMIN — ONDANSETRON 4 MG: 2 INJECTION INTRAMUSCULAR; INTRAVENOUS at 12:04

## 2023-04-08 RX ADMIN — DEXTROSE MONOHYDRATE: 100 INJECTION, SOLUTION INTRAVENOUS at 04:04

## 2023-04-08 RX ADMIN — POTASSIUM BICARBONATE 20 MEQ: 391 TABLET, EFFERVESCENT ORAL at 07:04

## 2023-04-08 RX ADMIN — MORPHINE SULFATE 4 MG: 4 INJECTION INTRAVENOUS at 12:04

## 2023-04-08 RX ADMIN — DEXTROSE MONOHYDRATE: 100 INJECTION, SOLUTION INTRAVENOUS at 05:04

## 2023-04-08 RX ADMIN — SERTRALINE HYDROCHLORIDE 50 MG: 50 TABLET ORAL at 08:04

## 2023-04-08 RX ADMIN — POTASSIUM BICARBONATE 50 MEQ: 977.5 TABLET, EFFERVESCENT ORAL at 01:04

## 2023-04-08 RX ADMIN — ASPIRIN 81 MG CHEWABLE TABLET 81 MG: 81 TABLET CHEWABLE at 08:04

## 2023-04-08 NOTE — SUBJECTIVE & OBJECTIVE
Interval History: still confused this morning, states he's out of it. Glucose better but still with D10 infusing. Discussed with his mother, patient has hx of drug use and then takes insulin but then doesn't and is not managing his diabetes well at home.    Review of Systems  Objective:     Vital Signs (Most Recent):  Temp: 98.2 °F (36.8 °C) (04/08/23 0854)  Pulse: 93 (04/08/23 1055)  Resp: 18 (04/08/23 1055)  BP: 102/64 (04/08/23 1055)  SpO2: 98 % (04/08/23 1055) Vital Signs (24h Range):  Temp:  [97.6 °F (36.4 °C)-98.2 °F (36.8 °C)] 98.2 °F (36.8 °C)  Pulse:  [] 93  Resp:  [18-26] 18  SpO2:  [97 %-100 %] 98 %  BP: (102-193)/() 102/64     Weight: 57.2 kg (126 lb 1.7 oz)  Body mass index is 19.17 kg/m².    Intake/Output Summary (Last 24 hours) at 4/8/2023 1258  Last data filed at 4/8/2023 0135  Gross per 24 hour   Intake --   Output 1000 ml   Net -1000 ml      Physical Exam  Vitals and nursing note reviewed.   Constitutional:       General: He is not in acute distress.     Appearance: He is ill-appearing.   HENT:      Head: Normocephalic.   Cardiovascular:      Rate and Rhythm: Normal rate and regular rhythm.   Pulmonary:      Effort: Pulmonary effort is normal. No respiratory distress.      Breath sounds: No wheezing.   Abdominal:      General: Bowel sounds are normal. There is no distension.      Tenderness: There is no abdominal tenderness.   Musculoskeletal:         General: No swelling.      Comments: Left wrist in brace   Neurological:      Mental Status: He is alert. He is disoriented.       Significant Labs: All pertinent labs within the past 24 hours have been reviewed.    Significant Imaging: I have reviewed all pertinent imaging results/findings within the past 24 hours.

## 2023-04-08 NOTE — SUBJECTIVE & OBJECTIVE
Past Medical History:   Diagnosis Date    Acute on chronic pancreatitis 04/03/2016    Addiction to drug     CHF (congestive heart failure)     DKA (diabetic ketoacidoses) 02/2020    DM (diabetes mellitus), type 1     HCV (hepatitis C virus)     high VL     HTN (hypertension)     Hx of psychiatric care     Hypomagnesemia 05/25/2020    IVDU (intravenous drug user)     Heroin    Pancreatitis     Psychiatric problem     Substance abuse        Past Surgical History:   Procedure Laterality Date    CARDIAC SURGERY  1999    stent placed. ([facility])    ESOPHAGOGASTRODUODENOSCOPY N/A 3/5/2020    Gastritis.  No H pylori.  Completed PPI for 1 month.  Procedure: EGD (ESOPHAGOGASTRODUODENOSCOPY);  Surgeon: Brinda Rene MD;  Location: [facility] ENDO;  Service: Endoscopy;  Laterality: N/A;  [id number]; [id number]    LEFT HEART CATHETERIZATION Left 9/28/2020    Procedure: Left heart cath;  Surgeon: Fito Rangel MD;  Location: [facility] CATH LAB;  Service: Cardiology;  Laterality: Left;    SHOULDER SURGERY  2013    right shoulder, spur removal.       Review of patient's allergies indicates:  No Known Allergies    No current facility-administered medications on file prior to encounter.     Current Outpatient Medications on File Prior to Encounter   Medication Sig    insulin detemir U-100 (LEVEMIR FLEXTOUCH) 100 unit/mL (3 mL) SubQ InPn pen Inject 20 Units into the skin every evening.    aspirin 81 MG Chew Take 1 tablet (81 mg total) by mouth once daily.    atorvastatin (LIPITOR) 20 MG tablet Take 1 tablet (20 mg total) by mouth once daily.    cetirizine (ZYRTEC) 10 MG tablet Take 1 tablet (10 mg total) by mouth once daily.    gabapentin (NEURONTIN) 400 MG capsule Take 400 mg by mouth 3 (three) times daily.    insulin lispro 100 unit/mL pen Inject 7 Units into the skin 3 (three) times daily with meals.    loratadine (CLARITIN) 10 mg tablet Take 10 mg by mouth.    naloxone (NARCAN) 4 mg/actuation Spry 4mg by nasal route as needed for  opioid overdose; may repeat every 2-3 minutes in alternating nostrils until medical help arrives. Call 911    nitroGLYCERIN (NITROSTAT) 0.4 MG SL tablet Place 1 tablet (0.4 mg total) under the tongue every 5 (five) minutes as needed for Chest pain. (Patient not taking: Reported on 2022)    QUEtiapine (SEROQUEL) 100 MG Tab Take 100 mg by mouth every evening.    sertraline (ZOLOFT) 25 MG tablet Take 25 mg by mouth 2 (two) times daily.    [DISCONTINUED] insulin aspart U-100 (NOVOLOG) 100 unit/mL injection Inject 1-10 Units into the skin 3 (three) times daily with meals.     Family History       Problem Relation (Age of Onset)    Asthma Mother          Tobacco Use    Smoking status: Every Day     Packs/day: 1.00     Years: 20.00     Pack years: 20.00     Types: Cigarettes     Start date: 4/3/1981     Last attempt to quit: 2020     Years since quittin.8    Smokeless tobacco: Never    Tobacco comments:     states approx 1 per day.   Substance and Sexual Activity    Alcohol use: Not Currently     Alcohol/week: 0.0 standard drinks    Drug use: Yes     Types: Heroin, IV, Marijuana, Amphetamines     Comment: chronic methamphetamine user    Sexual activity: Yes     Partners: Female     Review of Systems   Constitutional:  Negative for chills and fever.   HENT:  Negative for congestion and rhinorrhea.    Eyes:  Negative for photophobia and visual disturbance.   Respiratory:  Negative for cough and shortness of breath.    Cardiovascular:  Negative for chest pain, palpitations and leg swelling.   Gastrointestinal:  Negative for abdominal pain, diarrhea, nausea and vomiting.   Genitourinary:  Negative for frequency, hematuria and urgency.   Musculoskeletal:  Positive for arthralgias (Left wrist pain) and myalgias.   Skin:  Negative for pallor, rash and wound.   Neurological:  Positive for headaches. Negative for seizures and light-headedness.   Psychiatric/Behavioral:  Positive for confusion and decreased  concentration. Negative for hallucinations, self-injury and suicidal ideas. The patient is hyperactive. The patient is not nervous/anxious.    Objective:     Vital Signs (Most Recent):  Temp: 98 °F (36.7 °C) (04/08/23 0143)  Pulse: 95 (04/08/23 0450)  Resp: (!) 23 (04/08/23 0450)  BP: (!) 148/80 (04/08/23 0402)  SpO2: 99 % (04/08/23 0450)   Vital Signs (24h Range):  Temp:  [97.7 °F (36.5 °C)-98 °F (36.7 °C)] 98 °F (36.7 °C)  Pulse:  [] 95  Resp:  [18-26] 23  SpO2:  [97 %-100 %] 99 %  BP: (130-193)/() 148/80     Weight: 61.6 kg (135 lb 12.9 oz)  Body mass index is 20.65 kg/m².    Physical Exam  Vitals and nursing note reviewed.   Constitutional:       General: He is not in acute distress.     Appearance: He is well-developed.   HENT:      Head: Normocephalic and atraumatic.      Right Ear: External ear normal.      Left Ear: External ear normal.      Nose: Nose normal.   Eyes:      Conjunctiva/sclera: Conjunctivae normal.      Pupils: Pupils are equal, round, and reactive to light.   Cardiovascular:      Rate and Rhythm: Regular rhythm. Tachycardia present.   Pulmonary:      Effort: Pulmonary effort is normal. No respiratory distress.      Breath sounds: Normal breath sounds. No wheezing or rales.   Abdominal:      General: Bowel sounds are normal. There is no distension.      Palpations: Abdomen is soft.      Tenderness: There is no abdominal tenderness.      Comments: No palpable hepatomegaly or splenomegaly   Musculoskeletal:      Right wrist: Normal.      Left wrist: Tenderness and snuff box tenderness present.      Cervical back: Normal range of motion and neck supple.      Right hip: Normal.      Left hip: Tenderness present. No bony tenderness or crepitus. Decreased range of motion. Normal strength.      Comments: full ROM of left wrist with pain on extension and ulnar deviation with significant snuffbox tenderness  Decreased ROM on left hip due to pain. Left hip is tender to palpation. No left hip  shortening or deformities noted.   Skin:     General: Skin is warm and dry.   Neurological:      Mental Status: He is alert and oriented to person, place, and time.   Psychiatric:         Thought Content: Thought content normal.         CRANIAL NERVES     CN III, IV, VI   Pupils are equal, round, and reactive to light.     Significant Labs: All pertinent labs within the past 24 hours have been reviewed.  CBC:   Recent Labs   Lab 04/07/23  2234   WBC 8.67   HGB 11.9*   HCT 37.5*        CMP:   Recent Labs   Lab 04/07/23  2234   *   K 3.4*      CO2 23      BUN 13   CREATININE 0.9   CALCIUM 8.5*   PROT 7.5   ALBUMIN 3.8   BILITOT 0.3   ALKPHOS 86   AST 19   ALT 11   ANIONGAP 10     Magnesium:   Recent Labs   Lab 04/07/23  2234   MG 1.6     Recent Lab Results  (Last 5 results in the past 24 hours)        04/08/23  0433   04/08/23  0340   04/08/23  0226   04/08/23  0103   04/07/23  2312        POCT Glucose 150   37   85   127   65                              Significant Imaging: I have reviewed all pertinent imaging results/findings within the past 24 hours.  Imaging Results              X-Ray Wrist Complete Left (In process)                      X-Ray Hip 2 or 3 views Left (with Pelvis when performed) (Final result)  Result time 04/08/23 00:21:59      Final result by Cristo Fish MD (04/08/23 00:21:59)                   Impression:      No acute radiographic abnormality.      Electronically signed by: Cristo Fish  Date:    04/08/2023  Time:    00:21               Narrative:    EXAMINATION:  XR HIP WITH PELVIS WHEN PERFORMED, 2 OR 3 VIEWS LEFT    CLINICAL HISTORY:  Pain in left hip    TECHNIQUE:  AP view of the pelvis and frog leg lateral view of the left hip were performed.    COMPARISON:  None    FINDINGS:  No acute fracture, subluxation or dislocation.  No mass or foreign body.  Joint spaces are adequately maintained bilaterally.  No evidence of avascular necrosis.  No osseous  destruction.                                       X-Ray Chest AP Portable (Final result)  Result time 04/08/23 00:20:03      Final result by Cristo Fish MD (04/08/23 00:20:03)                   Impression:      No significant change in cardiopulmonary status.    Stable mild blunting of the costophrenic angle on the right could represent a small effusion or pleural scarring.      Electronically signed by: Cristo Fish  Date:    04/08/2023  Time:    00:20               Narrative:    EXAMINATION:  XR CHEST AP PORTABLE    CLINICAL HISTORY:  hypoglycemia, ams;    TECHNIQUE:  Single frontal view of the chest was performed.    COMPARISON:  02/02/2023    FINDINGS:  Cardiac silhouette is normal size.  Mild blunting of the costophrenic angle right could represent small effusion or pleural scarring.    Probable small nipple shadow on the right.    No evidence of pneumothorax.  No acute osseous abnormality.    Findings are similar to the prior study.                                       CT Head Without Contrast (Final result)  Result time 04/08/23 00:07:17      Final result by Linh Gordon MD (04/08/23 00:07:17)                   Impression:      No acute intracranial abnormalities identified.      Electronically signed by: Linh Gordon MD  Date:    04/08/2023  Time:    00:07               Narrative:    EXAMINATION:  CT HEAD WITHOUT CONTRAST    CLINICAL HISTORY:  fall, LOC;    TECHNIQUE:  Low dose axial images were obtained through the head.  Coronal and sagittal reformations were also performed. Contrast was not administered.    COMPARISON:  CT head from 02/02/2023.    FINDINGS:  There is mild chronic microvascular ischemic disease.  No evidence of acute/recent major vascular distribution cerebral infarction, intraparenchymal hemorrhage, or intra-axial space occupying lesion.  Ventricular system appears mildly dilated but stable in size.  No effacement of the skull-base cisterns. No abnormal extra-axial fluid  collections or blood products. Visualized paranasal sinuses and mastoid air cells are clear. The calvarium shows no significant abnormality.

## 2023-04-08 NOTE — HOSPITAL COURSE
Admitted with hypoglycemia and altered mental status. He was given multiple amps of dextrose and placed on D10 drip. He has known hx of methamphetamine use and insulin dependent diabetes. Still with agitation and confusion. Glucose only 133 despite being on D10 @ 100 cc/hr.

## 2023-04-08 NOTE — ED NOTES
"Pt report HA 8/10 and weird hearing sound that " can't explain" and weird vision sense. Pt reports he sees blue balloon.  Assess pt eye and vision normal.    "

## 2023-04-08 NOTE — ASSESSMENT & PLAN NOTE
Continues with methamphetamine use, confirmed by his mother  - counseling   Please advise about ibuprofen rx.    Called pharm this morning and was told by the pharmasist that rx for xanax had to be called into pharm voicemail line and was transferred to voicemail, rx has been called in.

## 2023-04-08 NOTE — ASSESSMENT & PLAN NOTE
POCT Glucose   Date Value Ref Range Status   04/08/2023 133 (H) 70 - 110 mg/dL Final   04/08/2023 101 70 - 110 mg/dL Final   04/08/2023 144 (H) 70 - 110 mg/dL Final   04/08/2023 150 (H) 70 - 110 mg/dL Final   04/08/2023 37 (LL) 70 - 110 mg/dL Final   04/08/2023 85 70 - 110 mg/dL Final   04/08/2023 127 (H) 70 - 110 mg/dL Final   04/07/2023 65 (L) 70 - 110 mg/dL Final   04/07/2023 79 70 - 110 mg/dL Final     Received an amp of D50 in the ED along with a bolus of D10 after result of 37  Hold all insulin for now   D10 W at 100 mL/hour adjust as needed - still only 133, continue with D10 for now  - once stays above 130 consistently can start weaning

## 2023-04-08 NOTE — PROGRESS NOTES
[facility] Medicine  Progress Note    Patient Name: Cali Mabry  MRN: [id number]  Patient Class: OP- Observation   Admission Date: 4/7/2023  Length of Stay: 0 days  Attending Physician: Mahamed Garcia MD  Primary Care Provider: Primary Doctor No        Subjective:     Principal Problem:Hypoglycemia        HPI:  Cali Mabry 57 y.o. male with CHF, DM type 1, DKA, Hep C, HTN, and substance abuse who presents to the hospital with a chief complaint of hypoglycemia.  Acute onset the evening of 04/07/2023 Patient reports he was on his way to a pizza place when he fell down and landed on his sides. Patient reports he is unsure if he loss consciousness, but notes his chronic left hip pain exacerbated after the fall and that his left wrist hurts. Patient reports he took his insulin at 5 PM but has not eaten since, patient does not know how much insulin he injected himself with stating I do not know man I can not remember when my sugar gets like this.  EMS reported that initial CBG when on scene was 38, patient was given D10 by EMS with recheck of 98. Patient rates his pain as tolerable Patient was rapid and pressured speech and continuously needs to be redirected to questions and answers.  Denies the use of any illicit substances this evening.  No other alleviating or exacerbating factors noted. Denies CP, SOB, abdominal pain, nausea, vomiting, numbness, or other associated symptoms.     In the ED patient was afebrile without leukocytosis, mild normocytic anemia noted, potassium 3.4, calcium 8.5, Tylenol and alcohol levels negative, UDS showed positive amphetamines, UA only showed +1 protein, POCT glucose has been labile ranging from .  Hip and chest x-ray were negative, CT head without contrast negative, EKG Rate 112, sinus tachycardia, intervals within normal limits, no ST elevations or depressions noted, nonspecific ST changes.  Patient was placed in observation for further workup  and management of hypoglycemia      Overview/Hospital Course:  Admitted with hypoglycemia and altered mental status. He was given multiple amps of dextrose and placed on D10 drip. He has known hx of methamphetamine use and insulin dependent diabetes. Still with agitation and confusion. Glucose only 133 despite being on D10 @ 100 cc/hr.       Interval History: still confused this morning, states he's out of it. Glucose better but still with D10 infusing. Discussed with his mother, patient has hx of drug use and then takes insulin but then doesn't and is not managing his diabetes well at home.    Review of Systems  Objective:     Vital Signs (Most Recent):  Temp: 98.2 °F (36.8 °C) (04/08/23 0854)  Pulse: 93 (04/08/23 1055)  Resp: 18 (04/08/23 1055)  BP: 102/64 (04/08/23 1055)  SpO2: 98 % (04/08/23 1055) Vital Signs (24h Range):  Temp:  [97.6 °F (36.4 °C)-98.2 °F (36.8 °C)] 98.2 °F (36.8 °C)  Pulse:  [] 93  Resp:  [18-26] 18  SpO2:  [97 %-100 %] 98 %  BP: (102-193)/() 102/64     Weight: 57.2 kg (126 lb 1.7 oz)  Body mass index is 19.17 kg/m².    Intake/Output Summary (Last 24 hours) at 4/8/2023 1258  Last data filed at 4/8/2023 0135  Gross per 24 hour   Intake --   Output 1000 ml   Net -1000 ml      Physical Exam  Vitals and nursing note reviewed.   Constitutional:       General: He is not in acute distress.     Appearance: He is ill-appearing.   HENT:      Head: Normocephalic.   Cardiovascular:      Rate and Rhythm: Normal rate and regular rhythm.   Pulmonary:      Effort: Pulmonary effort is normal. No respiratory distress.      Breath sounds: No wheezing.   Abdominal:      General: Bowel sounds are normal. There is no distension.      Tenderness: There is no abdominal tenderness.   Musculoskeletal:         General: No swelling.      Comments: Left wrist in brace   Neurological:      Mental Status: He is alert. He is disoriented.       Significant Labs: All pertinent labs within the past 24 hours have  been reviewed.    Significant Imaging: I have reviewed all pertinent imaging results/findings within the past 24 hours.      Assessment/Plan:      * Hypoglycemia  POCT Glucose   Date Value Ref Range Status   04/08/2023 133 (H) 70 - 110 mg/dL Final   04/08/2023 101 70 - 110 mg/dL Final   04/08/2023 144 (H) 70 - 110 mg/dL Final   04/08/2023 150 (H) 70 - 110 mg/dL Final   04/08/2023 37 (LL) 70 - 110 mg/dL Final   04/08/2023 85 70 - 110 mg/dL Final   04/08/2023 127 (H) 70 - 110 mg/dL Final   04/07/2023 65 (L) 70 - 110 mg/dL Final   04/07/2023 79 70 - 110 mg/dL Final     Received an amp of D50 in the ED along with a bolus of D10 after result of 37  Hold all insulin for now   D10 W at 100 mL/hour adjust as needed - still only 133, continue with D10 for now  - once stays above 130 consistently can start weaning    Left wrist pain  Patient was not a good historian however he does believe that he fell onto his left side, on exam patient had full ROM of left wrist with pain on extension and ulnar deviation with significant snuffbox tenderness  Left wrist x-ray with no acute findings  Left wrist splint  - supportive care    History of MI (myocardial infarction)  History noted, patient without complaints of chest pain or other associated symptoms, EKG Rate 112, sinus tachycardia, intervals within normal limits, no ST elevations or depressions noted, nonspecific ST changes      Amphetamine use disorder, severe, dependence  Continues with methamphetamine use, confirmed by his mother  - counseling    Hypokalemia  Potassium on admit 3.4 replete as necessary   A.m. BMP and magnesium    CAD (coronary artery disease)  Resume ASA, statin    COPD (chronic obstructive pulmonary disease)  Not in exacerbation   PRN Duo-Nebs     Polysubstance use disorder  Long chronic history of substance abuse, mostly IV heroin and amphetamines, appears to currently be under the influence of amphetamines recommend counseling education once the effects  have worn off    Hyperlipidemia  Chronic, stable, continue statin    Anemia of chronic disease  Patient H/H stable and consistent with baseline. No evidence acute bleeding or indication for transfusion at this time      VTE Risk Mitigation (From admission, onward)         Ordered     IP VTE LOW RISK PATIENT  Once         04/08/23 0429     Place sequential compression device  Until discontinued         04/08/23 0429                Discharge Planning   ROLA:      Code Status: Full Code   Is the patient medically ready for discharge?:     Reason for patient still in hospital (select all that apply): Treatment                     Mahamed Garcia MD  Department of Hospital Medicine   [facility]

## 2023-04-08 NOTE — ASSESSMENT & PLAN NOTE
POCT Glucose   Date Value Ref Range Status   04/08/2023 150 (H) 70 - 110 mg/dL Final   04/08/2023 37 (LL) 70 - 110 mg/dL Final   04/08/2023 85 70 - 110 mg/dL Final   04/08/2023 127 (H) 70 - 110 mg/dL Final   04/07/2023 65 (L) 70 - 110 mg/dL Final   04/07/2023 79 70 - 110 mg/dL Final     Received an amp of D50 in the ED along with a bolus of D10 after result of 37  Hold all insulin for now   D10 W at 100 mL/hour adjust as needed  Q2H POCT checks

## 2023-04-08 NOTE — ED NOTES
Pt constantly looking for different things in his body to report as abnormally.  Pt states he has an infection from his birds.  He points out his skin is itchy.  Noted very scaly, dry skin.  Informed pt that moisturizing will help the skin. Pt verbalize he understands.

## 2023-04-08 NOTE — HPI
Cali Mabry 57 y.o. male with CHF, DM type 1, DKA, Hep C, HTN, and substance abuse who presents to the hospital with a chief complaint of hypoglycemia.  Acute onset the evening of 04/07/2023 Patient reports he was on his way to a pizza place when he fell down and landed on his sides. Patient reports he is unsure if he loss consciousness, but notes his chronic left hip pain exacerbated after the fall and that his left wrist hurts. Patient reports he took his insulin at 5 PM but has not eaten since, patient does not know how much insulin he injected himself with stating I do not know man I can not remember when my sugar gets like this.  EMS reported that initial CBG when on scene was 38, patient was given D10 by EMS with recheck of 98. Patient rates his pain as tolerable Patient was rapid and pressured speech and continuously needs to be redirected to questions and answers.  Denies the use of any illicit substances this evening.  No other alleviating or exacerbating factors noted. Denies CP, SOB, abdominal pain, nausea, vomiting, numbness, or other associated symptoms.     In the ED patient was afebrile without leukocytosis, mild normocytic anemia noted, potassium 3.4, calcium 8.5, Tylenol and alcohol levels negative, UDS showed positive amphetamines, UA only showed +1 protein, POCT glucose has been labile ranging from .  Hip and chest x-ray were negative, CT head without contrast negative, EKG Rate 112, sinus tachycardia, intervals within normal limits, no ST elevations or depressions noted, nonspecific ST changes.  Patient was placed in observation for further workup and management of hypoglycemia

## 2023-04-08 NOTE — ED PROVIDER NOTES
Encounter Date: 4/7/2023    SCRIBE #1 NOTE: I, Davian Cramer, am scribing for, and in the presence of,  Francis Grover MD. I have scribed the following portions of the note - Other sections scribed: HPI, ROS, PE.     History     Chief Complaint   Patient presents with    Diabetes     Patient brought in by EMS with hypoglycemia, initial CBG of 38. Given D10 by EMS, recheck of 98. Patient appears confused.     Cali Mabry is a 57 y.o male with a PMHx of pancreatitis, CHF, DM type 1, DKA, Hep C, HTN, and substance abuse, that presents to the ED via EMS for evaluation of hypoglycemia beginning PTA. Patient reports he was on his way to a pizza place when he fell down and landed on his sides. Patient reports he is unsure if he loss consciousness, but notes his chronic left hip pain exacerbated after the fall. Patient reports he took his insulin at 5 PM but has not eaten since. No medications taken PTA. No alleviating or exacerbating factors noted. Denies CP, SOB, abdominal pain, nausea, vomiting, numbness, or other associated symptoms. NKDA.    The history is provided by the patient. No  was used.   Review of patient's allergies indicates:  No Known Allergies  Past Medical History:   Diagnosis Date    Acute on chronic pancreatitis 04/03/2016    Addiction to drug     CHF (congestive heart failure)     DKA (diabetic ketoacidoses) 02/2020    DM (diabetes mellitus), type 1     HCV (hepatitis C virus)     high VL     HTN (hypertension)     Hx of psychiatric care     Hypomagnesemia 05/25/2020    IVDU (intravenous drug user)     Heroin    Pancreatitis     Psychiatric problem     Substance abuse      Past Surgical History:   Procedure Laterality Date    CARDIAC SURGERY  1999    stent placed. ([facility])    ESOPHAGOGASTRODUODENOSCOPY N/A 3/5/2020    Gastritis.  No H pylori.  Completed PPI for 1 month.  Procedure: EGD (ESOPHAGOGASTRODUODENOSCOPY);  Surgeon: Brinda Rene MD;  Location:  [facility] ENDO;  Service: Endoscopy;  Laterality: N/A;  [id number] A; [phone]    LEFT HEART CATHETERIZATION Left 2020    Procedure: Left heart cath;  Surgeon: Fito Rangel MD;  Location: [facility] CATH LAB;  Service: Cardiology;  Laterality: Left;    SHOULDER SURGERY      right shoulder, spur removal.     Family History   Problem Relation Age of Onset    Asthma Mother      Social History     Tobacco Use    Smoking status: Every Day     Packs/day: 1.00     Years: 20.00     Pack years: 20.00     Types: Cigarettes     Start date: 4/3/1981     Last attempt to quit: 2020     Years since quittin.8    Smokeless tobacco: Never    Tobacco comments:     states approx 1 per day.   Substance Use Topics    Alcohol use: Not Currently     Alcohol/week: 0.0 standard drinks    Drug use: Yes     Types: Heroin, IV, Marijuana, Amphetamines     Comment: chronic methamphetamine user     Review of Systems   Constitutional:  Negative for chills and fever.        (+) hypoglycemia   HENT:  Negative for facial swelling and sore throat.    Eyes:  Negative for visual disturbance.   Respiratory:  Negative for cough and shortness of breath.    Cardiovascular:  Negative for chest pain and palpitations.   Gastrointestinal:  Negative for abdominal pain, nausea and vomiting.   Genitourinary:  Negative for dysuria and hematuria.   Musculoskeletal:  Positive for arthralgias (left hip). Negative for back pain.   Skin:  Negative for rash.   Neurological:  Positive for headaches. Negative for weakness.   Hematological:  Does not bruise/bleed easily.   Psychiatric/Behavioral: Negative.       Physical Exam     Initial Vitals [23 2207]   BP Pulse Resp Temp SpO2   130/60 (!) 114 18 97.7 °F (36.5 °C) 98 %      MAP       --         Physical Exam    Nursing note and vitals reviewed.  Constitutional: He appears well-developed and well-nourished. He is not diaphoretic. No distress.   HENT:   Head: Normocephalic and atraumatic.   Right Ear: External ear  normal.   Left Ear: External ear normal.   Nose: Nose normal.   Eyes: Conjunctivae are normal. No scleral icterus.   Neck: Neck supple. No tracheal deviation present.   No pain with ranging of the neck. No radiculopathy.   Cardiovascular:  Normal rate, regular rhythm and normal heart sounds.     Exam reveals no gallop and no friction rub.       No murmur heard.  Pulmonary/Chest: Breath sounds normal. No respiratory distress.   Abdominal: Abdomen is soft. Bowel sounds are normal. There is no abdominal tenderness.   Musculoskeletal:      Cervical back: Neck supple.      Comments: 5/5 strength on BLE.   Decreased ROM on left hip due to pain. Left hip is tender to palpation. No left hip shortening or deformities noted.  No midline spinal tenderness to the C/T/L vertebrae with no step-offs or deformities noted.  BLE neurovascularly intact.       Neurological: He is alert and oriented to person, place, and time. GCS score is 15. GCS eye subscore is 4. GCS verbal subscore is 5. GCS motor subscore is 6.   Neuropathy to feet noted.   Skin: Skin is warm and dry.   Psychiatric: He has a normal mood and affect. Thought content normal.       ED Course   Critical Care    Date/Time: 4/8/2023 5:30 AM  Performed by: Francis Grover MD  Authorized by: Jose Mitchell MD   Direct patient critical care time: 25 minutes  Additional history critical care time: 5 minutes  Ordering / reviewing critical care time: 5 minutes  Documentation critical care time: 5 minutes  Consulting other physicians critical care time: 5 minutes  Total critical care time (exclusive of procedural time) : 45 minutes  Critical care time was exclusive of teaching time and separately billable procedures and treating other patients.  Critical care was necessary to treat or prevent imminent or life-threatening deterioration of the following conditions: endocrine crisis.  Critical care was time spent personally by me on the following activities: review of old charts,  re-evaluation of patient's condition, pulse oximetry, ordering and review of radiographic studies, ordering and review of laboratory studies, ordering and performing treatments and interventions, obtaining history from patient or surrogate, examination of patient, evaluation of patient's response to treatment, discussions with primary provider, discussions with consultants and development of treatment plan with patient or surrogate.      Labs Reviewed   CBC W/ AUTO DIFFERENTIAL - Abnormal; Notable for the following components:       Result Value    RBC 4.46 (*)     Hemoglobin 11.9 (*)     Hematocrit 37.5 (*)     MCH 26.7 (*)     MCHC 31.7 (*)     RDW 14.9 (*)     Immature Granulocytes 0.6 (*)     Immature Grans (Abs) 0.05 (*)     Lymph # 0.9 (*)     Gran % 80.1 (*)     Lymph % 10.1 (*)     All other components within normal limits   COMPREHENSIVE METABOLIC PANEL - Abnormal; Notable for the following components:    Sodium 135 (*)     Potassium 3.4 (*)     Calcium 8.5 (*)     All other components within normal limits   DRUG SCREEN PANEL, URINE EMERGENCY - Abnormal; Notable for the following components:    Amphetamine Screen, Ur Presumptive Positive (*)     All other components within normal limits    Narrative:     Specimen Source->Urine   URINALYSIS, REFLEX TO URINE CULTURE - Abnormal; Notable for the following components:    Color, UA Colorless (*)     Protein, UA 1+ (*)     All other components within normal limits    Narrative:     Specimen Source->Urine   ACETAMINOPHEN LEVEL - Abnormal; Notable for the following components:    Acetaminophen (Tylenol), Serum <3.0 (*)     All other components within normal limits   POCT GLUCOSE - Abnormal; Notable for the following components:    POCT Glucose 65 (*)     All other components within normal limits   POCT GLUCOSE - Abnormal; Notable for the following components:    POCT Glucose 127 (*)     All other components within normal limits   POCT GLUCOSE - Abnormal; Notable for  the following components:    POCT Glucose 37 (*)     All other components within normal limits   POCT GLUCOSE - Abnormal; Notable for the following components:    POCT Glucose 150 (*)     All other components within normal limits   MAGNESIUM   ALCOHOL,MEDICAL (ETHANOL)   URINALYSIS MICROSCOPIC    Narrative:     Specimen Source->Urine   POCT GLUCOSE, HAND-HELD DEVICE   POCT GLUCOSE, HAND-HELD DEVICE   POCT GLUCOSE   POCT GLUCOSE   POCT GLUCOSE MONITORING CONTINUOUS   POCT GLUCOSE MONITORING CONTINUOUS   POCT GLUCOSE MONITORING CONTINUOUS   POCT GLUCOSE MONITORING CONTINUOUS   POCT GLUCOSE MONITORING CONTINUOUS          Imaging Results              X-Ray Wrist Complete Left (In process)                      X-Ray Hip 2 or 3 views Left (with Pelvis when performed) (Final result)  Result time 04/08/23 00:21:59      Final result by Cristo Fish MD (04/08/23 00:21:59)                   Impression:      No acute radiographic abnormality.      Electronically signed by: Cristo Fish  Date:    04/08/2023  Time:    00:21               Narrative:    EXAMINATION:  XR HIP WITH PELVIS WHEN PERFORMED, 2 OR 3 VIEWS LEFT    CLINICAL HISTORY:  Pain in left hip    TECHNIQUE:  AP view of the pelvis and frog leg lateral view of the left hip were performed.    COMPARISON:  None    FINDINGS:  No acute fracture, subluxation or dislocation.  No mass or foreign body.  Joint spaces are adequately maintained bilaterally.  No evidence of avascular necrosis.  No osseous destruction.                                       X-Ray Chest AP Portable (Final result)  Result time 04/08/23 00:20:03      Final result by Cristo Fish MD (04/08/23 00:20:03)                   Impression:      No significant change in cardiopulmonary status.    Stable mild blunting of the costophrenic angle on the right could represent a small effusion or pleural scarring.      Electronically signed by: Cristo Fish  Date:    04/08/2023  Time:    00:20                Narrative:    EXAMINATION:  XR CHEST AP PORTABLE    CLINICAL HISTORY:  hypoglycemia, ams;    TECHNIQUE:  Single frontal view of the chest was performed.    COMPARISON:  02/02/2023    FINDINGS:  Cardiac silhouette is normal size.  Mild blunting of the costophrenic angle right could represent small effusion or pleural scarring.    Probable small nipple shadow on the right.    No evidence of pneumothorax.  No acute osseous abnormality.    Findings are similar to the prior study.                                       CT Head Without Contrast (Final result)  Result time 04/08/23 00:07:17      Final result by Linh Gordon MD (04/08/23 00:07:17)                   Impression:      No acute intracranial abnormalities identified.      Electronically signed by: Linh Gordon MD  Date:    04/08/2023  Time:    00:07               Narrative:    EXAMINATION:  CT HEAD WITHOUT CONTRAST    CLINICAL HISTORY:  fall, LOC;    TECHNIQUE:  Low dose axial images were obtained through the head.  Coronal and sagittal reformations were also performed. Contrast was not administered.    COMPARISON:  CT head from 02/02/2023.    FINDINGS:  There is mild chronic microvascular ischemic disease.  No evidence of acute/recent major vascular distribution cerebral infarction, intraparenchymal hemorrhage, or intra-axial space occupying lesion.  Ventricular system appears mildly dilated but stable in size.  No effacement of the skull-base cisterns. No abnormal extra-axial fluid collections or blood products. Visualized paranasal sinuses and mastoid air cells are clear. The calvarium shows no significant abnormality.                                       Medications   aspirin chewable tablet 81 mg (has no administration in time range)   atorvastatin tablet 20 mg (has no administration in time range)   gabapentin capsule 400 mg (has no administration in time range)   QUEtiapine tablet 100 mg (100 mg Oral Given 4/8/23 4611)   sertraline tablet 50 mg  (has no administration in time range)   melatonin tablet 6 mg (has no administration in time range)   ondansetron disintegrating tablet 8 mg (has no administration in time range)   prochlorperazine injection Soln 5 mg (has no administration in time range)   polyethylene glycol packet 17 g (has no administration in time range)   aluminum-magnesium hydroxide-simethicone 200-200-20 mg/5 mL suspension 30 mL (has no administration in time range)   acetaminophen tablet 650 mg (has no administration in time range)   naloxone 0.4 mg/mL injection 0.02 mg (has no administration in time range)   glucagon (human recombinant) injection 1 mg (has no administration in time range)   dextrose 10% bolus 125 mL 125 mL (has no administration in time range)   dextrose 10% bolus 250 mL 250 mL (has no administration in time range)   dextrose 40 % gel 15,000 mg (has no administration in time range)   dextrose 40 % gel 30,000 mg (has no administration in time range)   ketorolac injection 15 mg (has no administration in time range)   dextrose 10 % infusion (has no administration in time range)   potassium bicarbonate disintegrating tablet 20 mEq (has no administration in time range)   dextrose 10% bolus 250 mL 250 mL (0 mLs Intravenous Stopped 4/8/23 0020)   morphine injection 4 mg (4 mg Intravenous Given 4/8/23 0008)   ondansetron injection 4 mg (4 mg Intravenous Given 4/8/23 0006)   potassium bicarbonate disintegrating tablet 50 mEq (50 mEq Oral Given 4/8/23 0108)   acetaminophen tablet 1,000 mg (1,000 mg Oral Given 4/8/23 0143)   dextrose 50 % in water (D50W) injection 25 g (25 g Intravenous Given 4/8/23 0350)   dextrose 10 % infusion ( Intravenous New Bag 4/8/23 0404)     Medical Decision Making:   History:   Old Medical Records: I decided to obtain old medical records.  Independently Interpreted Test(s):   I have ordered and independently interpreted EKG Reading(s) - see summary below  Clinical Tests:   Lab Tests: Ordered and  "Reviewed  Medical Tests: Ordered and Reviewed        Scribe Attestation:   Scribe #1: I performed the above scribed service and the documentation accurately describes the services I performed. I attest to the accuracy of the note.      ED Course as of 04/08/23 0531   Fri Apr 07, 2023 2229 EKG: Interpreted by me.  Rate 112, regular rhythm, sinus rhythm, intervals within normal limits, no ST elevations or depressions noted, nonspecific ST changes, EKG similar to previous. [CC]   2324 Patient with recurrent hypoglycemia.  Administering bolus of D10. [CC]   Sat Apr 08, 2023   0022 CT Head Without Contrast     Impression:     No acute intracranial abnormalities identified.    [CC]   0117 POCT Glucose(!): 127 [CC]   0125 Patient complaining of mild headache and "jaw clicking ".  Palpation of the patient's jaw without pain.  Does not appear displaced on exam.  I do not believe further imaging indicated.  Patient is speaking without difficulty.  I have ordered Tylenol for patient's headache.  CT the head is negative for any acute intracranial abnormality. [CC]   0528 MDM: I have independently evaluated and interpreted all available labs and imaging.  The suspected diagnosis, treatment and plan were discussed with the patient. All questions or concerns have been addressed.      After review of the patient's physical exam, ED testing, and history/symptoms, relevant labs, imaging, available outside records  a wide differential was considered including but not limited to: infectious, traumatic, vascular, toxicological , malignant, ischemic, embolic, psychological, genetic, iatrogenic, idiopathic, substance dependence/intoxication/withdrawal, electrolyte or blood dyscrasia, and other etiologies.     [CC]   0509 Patient is a 57-year-old male presenting to the emergency department for evaluation of syncope and collapse.  No obvious head injury noted on exam.  CT the head negative for any acute intracranial abnormalities.  Doubt " intracranial hemorrhage.  Patient is afebrile.  Mildly tachycardic on arrival.  No leukocytosis.  Chest x-ray without evidence of pneumonia, fluid overload, pneumothorax.  UA is not indicative of UTI.  Amphetamines positive on patient's urine drug screen.  He denies use.  Patient with recurrent hypoglycemia.  It was treated with D10 bolus, given a food tray, and juice and had recurrent hypoglycemia.  Repeat was 37.  Patient given amp of D50 and started on D10 drip.  Otherwise no traumatic injuries noted other than pain to left hip which x-ray was negative for acute fracture dislocation.  Plan to have patient admitted for further evaluation. [CC]      ED Course User Index  [CC] Francis Grover MD                 Clinical Impression:   Final diagnoses:  [E16.2] Hypoglycemia  [M25.552] Left hip pain        ED Disposition Condition    Observation Stable               I, Francis Grover MD, personally performed the services described in this documentation. All medical record entries made by the scribe were at my direction and in my presence. I have reviewed the chart and agree that the record reflects my personal performance and is accurate and complete.      Francis Grover MD  04/08/23 0531

## 2023-04-08 NOTE — ASSESSMENT & PLAN NOTE
Denies any recent intravenous drug use, UDS was positive for amphetamines patient does not take any medications regularly that would show positive on this result.

## 2023-04-08 NOTE — ASSESSMENT & PLAN NOTE
History noted, patient without complaints of chest pain or other associated symptoms, EKG Rate 112, sinus tachycardia, intervals within normal limits, no ST elevations or depressions noted, nonspecific ST changes

## 2023-04-08 NOTE — ASSESSMENT & PLAN NOTE
Patient H/H stable and consistent with baseline. No evidence acute bleeding or indication for transfusion at this time

## 2023-04-08 NOTE — ED TRIAGE NOTES
Pt present to ED via EMS for low blood sugar 38 initial, but with EMS 98 after EMS gave D10, at arrival 79.    Pt reports at 5pm he took his insulin without eating anything.  Went to [employer] place to get food but couldn't get inside due to his low sugar.   Pt denies LOC  Pt alert to self and situation but couldn't tell the year. Pt constantly searching for silver dollar that he said he search for 30 years and now may have lost   Pt reports fall on his back and left hip.  Pain level 8/10.

## 2023-04-08 NOTE — ASSESSMENT & PLAN NOTE
Patient was not a good historian however he does believe that he fell onto his left side, on exam patient had full ROM of left wrist with pain on extension and ulnar deviation with significant snuffbox tenderness  Left wrist x-ray pending  Left wrist splint  Consider ortho consult pending x-ray results

## 2023-04-08 NOTE — ASSESSMENT & PLAN NOTE
Long chronic history of substance abuse, mostly IV heroin and amphetamines, appears to currently be under the influence of amphetamines recommend counseling education once the effects have worn off

## 2023-04-08 NOTE — H&P
[facility] [place] [facility]  [facility] Medicine  History & Physical    Patient Name: Cali Mabry  MRN: [id number]  Patient Class: OP- Observation  Admission Date: 4/7/2023  Attending Physician: Jose Mitchell MD   Primary Care Provider: Primary Doctor No         Patient information was obtained from patient, past medical records and ER records.     Subjective:     Principal Problem:Hypoglycemia    Chief Complaint:   Chief Complaint   Patient presents with    Diabetes     Patient brought in by EMS with hypoglycemia, initial CBG of 38. Given D10 by EMS, recheck of 98. Patient appears confused.        HPI: Cali Mabry 57 y.o. male with CHF, DM type 1, DKA, Hep C, HTN, and substance abuse who presents to the hospital with a chief complaint of hypoglycemia.  Acute onset the evening of 04/07/2023 Patient reports he was on his way to a pizza place when he fell down and landed on his sides. Patient reports he is unsure if he loss consciousness, but notes his chronic left hip pain exacerbated after the fall and that his left wrist hurts. Patient reports he took his insulin at 5 PM but has not eaten since, patient does not know how much insulin he injected himself with stating I do not know man I can not remember when my sugar gets like this.  EMS reported that initial CBG when on scene was 38, patient was given D10 by EMS with recheck of 98. Patient rates his pain as tolerable Patient was rapid and pressured speech and continuously needs to be redirected to questions and answers.  Denies the use of any illicit substances this evening.  No other alleviating or exacerbating factors noted. Denies CP, SOB, abdominal pain, nausea, vomiting, numbness, or other associated symptoms.     In the ED patient was afebrile without leukocytosis, mild normocytic anemia noted, potassium 3.4, calcium 8.5, Tylenol and alcohol levels negative, UDS showed positive amphetamines, UA only showed +1 protein, POCT glucose has been labile  ranging from .  Hip and chest x-ray were negative, CT head without contrast negative, EKG Rate 112, sinus tachycardia, intervals within normal limits, no ST elevations or depressions noted, nonspecific ST changes.  Patient was placed in observation for further workup and management of hypoglycemia      Past Medical History:   Diagnosis Date    Acute on chronic pancreatitis 04/03/2016    Addiction to drug     CHF (congestive heart failure)     DKA (diabetic ketoacidoses) 02/2020    DM (diabetes mellitus), type 1     HCV (hepatitis C virus)     high VL     HTN (hypertension)     Hx of psychiatric care     Hypomagnesemia 05/25/2020    IVDU (intravenous drug user)     Heroin    Pancreatitis     Psychiatric problem     Substance abuse        Past Surgical History:   Procedure Laterality Date    CARDIAC SURGERY  1999    stent placed. ([facility])    ESOPHAGOGASTRODUODENOSCOPY N/A 3/5/2020    Gastritis.  No H pylori.  Completed PPI for 1 month.  Procedure: EGD (ESOPHAGOGASTRODUODENOSCOPY);  Surgeon: Brinda Rene MD;  Location: [facility] ENDO;  Service: Endoscopy;  Laterality: N/A;  [id number]; [id number]    LEFT HEART CATHETERIZATION Left 9/28/2020    Procedure: Left heart cath;  Surgeon: Fito Rangel MD;  Location: [facility] CATH LAB;  Service: Cardiology;  Laterality: Left;    SHOULDER SURGERY  2013    right shoulder, spur removal.       Review of patient's allergies indicates:  No Known Allergies    No current facility-administered medications on file prior to encounter.     Current Outpatient Medications on File Prior to Encounter   Medication Sig    insulin detemir U-100 (LEVEMIR FLEXTOUCH) 100 unit/mL (3 mL) SubQ InPn pen Inject 20 Units into the skin every evening.    aspirin 81 MG Chew Take 1 tablet (81 mg total) by mouth once daily.    atorvastatin (LIPITOR) 20 MG tablet Take 1 tablet (20 mg total) by mouth once daily.    cetirizine (ZYRTEC) 10 MG tablet Take 1 tablet (10 mg total) by  mouth once daily.    gabapentin (NEURONTIN) 400 MG capsule Take 400 mg by mouth 3 (three) times daily.    insulin lispro 100 unit/mL pen Inject 7 Units into the skin 3 (three) times daily with meals.    loratadine (CLARITIN) 10 mg tablet Take 10 mg by mouth.    naloxone (NARCAN) 4 mg/actuation Spry 4mg by nasal route as needed for opioid overdose; may repeat every 2-3 minutes in alternating nostrils until medical help arrives. Call 911    nitroGLYCERIN (NITROSTAT) 0.4 MG SL tablet Place 1 tablet (0.4 mg total) under the tongue every 5 (five) minutes as needed for Chest pain. (Patient not taking: Reported on 2022)    QUEtiapine (SEROQUEL) 100 MG Tab Take 100 mg by mouth every evening.    sertraline (ZOLOFT) 25 MG tablet Take 25 mg by mouth 2 (two) times daily.    [DISCONTINUED] insulin aspart U-100 (NOVOLOG) 100 unit/mL injection Inject 1-10 Units into the skin 3 (three) times daily with meals.     Family History       Problem Relation (Age of Onset)    Asthma Mother          Tobacco Use    Smoking status: Every Day     Packs/day: 1.00     Years: 20.00     Pack years: 20.00     Types: Cigarettes     Start date: 4/3/1981     Last attempt to quit: 2020     Years since quittin.8    Smokeless tobacco: Never    Tobacco comments:     states approx 1 per day.   Substance and Sexual Activity    Alcohol use: Not Currently     Alcohol/week: 0.0 standard drinks    Drug use: Yes     Types: Heroin, IV, Marijuana, Amphetamines     Comment: chronic methamphetamine user    Sexual activity: Yes     Partners: Female     Review of Systems   Constitutional:  Negative for chills and fever.   HENT:  Negative for congestion and rhinorrhea.    Eyes:  Negative for photophobia and visual disturbance.   Respiratory:  Negative for cough and shortness of breath.    Cardiovascular:  Negative for chest pain, palpitations and leg swelling.   Gastrointestinal:  Negative for abdominal pain, diarrhea, nausea and vomiting.    Genitourinary:  Negative for frequency, hematuria and urgency.   Musculoskeletal:  Positive for arthralgias (Left wrist pain) and myalgias.   Skin:  Negative for pallor, rash and wound.   Neurological:  Positive for headaches. Negative for seizures and light-headedness.   Psychiatric/Behavioral:  Positive for confusion and decreased concentration. Negative for hallucinations, self-injury and suicidal ideas. The patient is hyperactive. The patient is not nervous/anxious.    Objective:     Vital Signs (Most Recent):  Temp: 98 °F (36.7 °C) (04/08/23 0143)  Pulse: 95 (04/08/23 0450)  Resp: (!) 23 (04/08/23 0450)  BP: (!) 148/80 (04/08/23 0402)  SpO2: 99 % (04/08/23 0450)   Vital Signs (24h Range):  Temp:  [97.7 °F (36.5 °C)-98 °F (36.7 °C)] 98 °F (36.7 °C)  Pulse:  [] 95  Resp:  [18-26] 23  SpO2:  [97 %-100 %] 99 %  BP: (130-193)/() 148/80     Weight: 61.6 kg (135 lb 12.9 oz)  Body mass index is 20.65 kg/m².    Physical Exam  Vitals and nursing note reviewed.   Constitutional:       General: He is not in acute distress.     Appearance: He is well-developed.   HENT:      Head: Normocephalic and atraumatic.      Right Ear: External ear normal.      Left Ear: External ear normal.      Nose: Nose normal.   Eyes:      Conjunctiva/sclera: Conjunctivae normal.      Pupils: Pupils are equal, round, and reactive to light.   Cardiovascular:      Rate and Rhythm: Regular rhythm. Tachycardia present.   Pulmonary:      Effort: Pulmonary effort is normal. No respiratory distress.      Breath sounds: Normal breath sounds. No wheezing or rales.   Abdominal:      General: Bowel sounds are normal. There is no distension.      Palpations: Abdomen is soft.      Tenderness: There is no abdominal tenderness.      Comments: No palpable hepatomegaly or splenomegaly   Musculoskeletal:      Right wrist: Normal.      Left wrist: Tenderness and snuff box tenderness present.      Cervical back: Normal range of motion and neck supple.       Right hip: Normal.      Left hip: Tenderness present. No bony tenderness or crepitus. Decreased range of motion. Normal strength.      Comments: full ROM of left wrist with pain on extension and ulnar deviation with significant snuffbox tenderness  Decreased ROM on left hip due to pain. Left hip is tender to palpation. No left hip shortening or deformities noted.   Skin:     General: Skin is warm and dry.   Neurological:      Mental Status: He is alert and oriented to person, place, and time.   Psychiatric:         Thought Content: Thought content normal.         CRANIAL NERVES     CN III, IV, VI   Pupils are equal, round, and reactive to light.     Significant Labs: All pertinent labs within the past 24 hours have been reviewed.  CBC:   Recent Labs   Lab 04/07/23 2234   WBC 8.67   HGB 11.9*   HCT 37.5*        CMP:   Recent Labs   Lab 04/07/23 2234   *   K 3.4*      CO2 23      BUN 13   CREATININE 0.9   CALCIUM 8.5*   PROT 7.5   ALBUMIN 3.8   BILITOT 0.3   ALKPHOS 86   AST 19   ALT 11   ANIONGAP 10     Magnesium:   Recent Labs   Lab 04/07/23  2234   MG 1.6     Recent Lab Results  (Last 5 results in the past 24 hours)        04/08/23  0433   04/08/23  0340   04/08/23  0226   04/08/23  0103   04/07/23  2312        POCT Glucose 150   37   85   127   65                              Significant Imaging: I have reviewed all pertinent imaging results/findings within the past 24 hours.  Imaging Results              X-Ray Wrist Complete Left (In process)                      X-Ray Hip 2 or 3 views Left (with Pelvis when performed) (Final result)  Result time 04/08/23 00:21:59      Final result by Cristo Fish MD (04/08/23 00:21:59)                   Impression:      No acute radiographic abnormality.      Electronically signed by: Cristo Fish  Date:    04/08/2023  Time:    00:21               Narrative:    EXAMINATION:  XR HIP WITH PELVIS WHEN PERFORMED, 2 OR 3 VIEWS LEFT    CLINICAL  HISTORY:  Pain in left hip    TECHNIQUE:  AP view of the pelvis and frog leg lateral view of the left hip were performed.    COMPARISON:  None    FINDINGS:  No acute fracture, subluxation or dislocation.  No mass or foreign body.  Joint spaces are adequately maintained bilaterally.  No evidence of avascular necrosis.  No osseous destruction.                                       X-Ray Chest AP Portable (Final result)  Result time 04/08/23 00:20:03      Final result by Cristo Fish MD (04/08/23 00:20:03)                   Impression:      No significant change in cardiopulmonary status.    Stable mild blunting of the costophrenic angle on the right could represent a small effusion or pleural scarring.      Electronically signed by: Cristo Fish  Date:    04/08/2023  Time:    00:20               Narrative:    EXAMINATION:  XR CHEST AP PORTABLE    CLINICAL HISTORY:  hypoglycemia, ams;    TECHNIQUE:  Single frontal view of the chest was performed.    COMPARISON:  02/02/2023    FINDINGS:  Cardiac silhouette is normal size.  Mild blunting of the costophrenic angle right could represent small effusion or pleural scarring.    Probable small nipple shadow on the right.    No evidence of pneumothorax.  No acute osseous abnormality.    Findings are similar to the prior study.                                       CT Head Without Contrast (Final result)  Result time 04/08/23 00:07:17      Final result by Linh Gordon MD (04/08/23 00:07:17)                   Impression:      No acute intracranial abnormalities identified.      Electronically signed by: Linh Gordon MD  Date:    04/08/2023  Time:    00:07               Narrative:    EXAMINATION:  CT HEAD WITHOUT CONTRAST    CLINICAL HISTORY:  fall, LOC;    TECHNIQUE:  Low dose axial images were obtained through the head.  Coronal and sagittal reformations were also performed. Contrast was not administered.    COMPARISON:  CT head from 02/02/2023.    FINDINGS:  There  is mild chronic microvascular ischemic disease.  No evidence of acute/recent major vascular distribution cerebral infarction, intraparenchymal hemorrhage, or intra-axial space occupying lesion.  Ventricular system appears mildly dilated but stable in size.  No effacement of the skull-base cisterns. No abnormal extra-axial fluid collections or blood products. Visualized paranasal sinuses and mastoid air cells are clear. The calvarium shows no significant abnormality.                                        Assessment/Plan:     * Hypoglycemia  POCT Glucose   Date Value Ref Range Status   04/08/2023 150 (H) 70 - 110 mg/dL Final   04/08/2023 37 (LL) 70 - 110 mg/dL Final   04/08/2023 85 70 - 110 mg/dL Final   04/08/2023 127 (H) 70 - 110 mg/dL Final   04/07/2023 65 (L) 70 - 110 mg/dL Final   04/07/2023 79 70 - 110 mg/dL Final     Received an amp of D50 in the ED along with a bolus of D10 after result of 37  Hold all insulin for now   D10 W at 100 mL/hour adjust as needed  Q2H POCT checks    Left wrist pain  Patient was not a good historian however he does believe that he fell onto his left side, on exam patient had full ROM of left wrist with pain on extension and ulnar deviation with significant snuffbox tenderness  Left wrist x-ray pending  Left wrist splint  Consider ortho consult pending x-ray results    History of MI (myocardial infarction)  History noted, patient without complaints of chest pain or other associated symptoms, EKG Rate 112, sinus tachycardia, intervals within normal limits, no ST elevations or depressions noted, nonspecific ST changes      Amphetamine use disorder, severe, dependence  As above    Hypokalemia  Potassium on admit 3.4 replete as necessary   A.m. BMP and magnesium    CAD (coronary artery disease)  Resume ASA, statin    COPD (chronic obstructive pulmonary disease)  Not in exacerbation   PRN Duo-Nebs     Polysubstance use disorder  Long chronic history of substance abuse, mostly IV heroin  and amphetamines, appears to currently be under the influence of amphetamines recommend counseling education once the effects have worn off    Intravenous drug abuse  Denies any recent intravenous drug use, UDS was positive for amphetamines patient does not take any medications regularly that would show positive on this result.     Hyperlipidemia  Chronic, stable, continue statin    Anemia of chronic disease  Patient H/H stable and consistent with baseline. No evidence acute bleeding or indication for transfusion at this time      VTE Risk Mitigation (From admission, onward)         Ordered     IP VTE LOW RISK PATIENT  Once         04/08/23 0429     Place sequential compression device  Until discontinued         04/08/23 0429                     On 04/08/2023, patient should be placed in hospital observation services under my care in collaboration with Jose Mitchell MD.      Roderick Leahy NP  Department of Hospital Medicine  [facility] [place] - Emergency Dept

## 2023-04-08 NOTE — PLAN OF CARE
Problem: Violence Risk or Actual  Goal: Anger and Impulse Control  Outcome: Ongoing, Progressing     Problem: Adult Inpatient Plan of Care  Goal: Plan of Care Review  Outcome: Ongoing, Progressing  Goal: Patient-Specific Goal (Individualized)  Outcome: Ongoing, Progressing  Goal: Absence of Hospital-Acquired Illness or Injury  Outcome: Ongoing, Progressing  Goal: Optimal Comfort and Wellbeing  Outcome: Ongoing, Progressing  Goal: Readiness for Transition of Care  Outcome: Ongoing, Progressing     Problem: Infection  Goal: Absence of Infection Signs and Symptoms  Outcome: Ongoing, Progressing     Problem: Fluid and Electrolyte Imbalance (Acute Kidney Injury/Impairment)  Goal: Fluid and Electrolyte Balance  Outcome: Ongoing, Progressing     Problem: Oral Intake Inadequate (Acute Kidney Injury/Impairment)  Goal: Optimal Nutrition Intake  Outcome: Ongoing, Progressing     Problem: Renal Function Impairment (Acute Kidney Injury/Impairment)  Goal: Effective Renal Function  Outcome: Ongoing, Progressing

## 2023-04-08 NOTE — NURSING
[facility]  Nurses Note -- 4 Eyes      4/8/2023       Skin assessed on: Admit      [x] No Pressure Injuries Present    []Prevention Measures Documented    [] Yes LDA  for Pressure Injury Previously documented     [] Yes New Pressure Injury Discovered   [] LDA for New Pressure Injury Added      Attending RN:  Yolette Mistry, RN     Second RN:  RITA Hicks charge nurse

## 2023-04-08 NOTE — ED NOTES
Gave pt turkey sandwich and apple juice.  Pt ate all his sandwich, but only drink 2.5oz of the apple juice.

## 2023-04-08 NOTE — ASSESSMENT & PLAN NOTE
Patient was not a good historian however he does believe that he fell onto his left side, on exam patient had full ROM of left wrist with pain on extension and ulnar deviation with significant snuffbox tenderness  Left wrist x-ray with no acute findings  Left wrist splint  - supportive care

## 2023-04-09 NOTE — CARE UPDATE
The patient has chosen to leave the hospital against our medical advice. The patient is clinically not intoxicated, free from distracting pain, appears to have intact insight, judgment and reason and in my medical opinion has the capacity to make decisions. The patient is also not under any duress to leave the hospital. In this scenario, it would be battery to subject a patient to treatment against his/her will. I have voiced my concerns for the patient's health given that a full evaluation and treatment had not occurred. I have discussed the need for continued evaluation to determine if their symptoms are caused by a condition that present risk of death or morbidity. Risks including but not limited to death, permanent disability, prolonged hospitalization, prolonged illness, were discussed. I tried offering alternative options in hopes that the patient might be amenable to partial evaluation and treatment which would be medically beneficial to the patient, though the patient declined my options and insisted on leaving. Because I have been unable to convince the patient to stay, I answered all of their questions about their condition and asked them to return to the ED as soon as possible to complete their evaluation, especially if their symptoms worsen or do not improve. I emphasized that leaving against medical advice does not preclude returning here for further evaluation. I asked the patient to return if they change their mind about the further evaluation and treatment. I strongly encouraged the patient to return to this Emergency Department or any Emergency Department at any time, particularly with worsening symptoms.

## 2023-05-08 PROBLEM — N17.9 AKI (ACUTE KIDNEY INJURY): Status: RESOLVED | Noted: 2022-03-07 | Resolved: 2023-05-08

## 2023-06-30 ENCOUNTER — HOSPITAL ENCOUNTER (INPATIENT)
Facility: HOSPITAL | Age: 58
LOS: 2 days | Discharge: HOME | DRG: 190 | End: 2023-07-02
Attending: EMERGENCY MEDICINE | Admitting: EMERGENCY MEDICINE
Payer: MEDICARE

## 2023-06-30 DIAGNOSIS — R06.02 SHORTNESS OF BREATH: ICD-10-CM

## 2023-06-30 DIAGNOSIS — J44.1 COPD EXACERBATION: Primary | ICD-10-CM

## 2023-06-30 DIAGNOSIS — R07.9 CHEST PAIN: ICD-10-CM

## 2023-06-30 LAB
ALBUMIN SERPL BCP-MCNC: 4.2 G/DL (ref 3.5–5.2)
ALLENS TEST: ABNORMAL
ALP SERPL-CCNC: 122 U/L (ref 55–135)
ALT SERPL W/O P-5'-P-CCNC: 19 U/L (ref 10–44)
ANION GAP SERPL CALC-SCNC: 12 MMOL/L (ref 8–16)
AST SERPL-CCNC: 21 U/L (ref 10–40)
BASOPHILS # BLD AUTO: 0.04 K/UL (ref 0–0.2)
BASOPHILS NFR BLD: 0.3 % (ref 0–1.9)
BILIRUB SERPL-MCNC: 0.4 MG/DL (ref 0.1–1)
BNP SERPL-MCNC: 93 PG/ML (ref 0–99)
BUN SERPL-MCNC: 17 MG/DL (ref 6–20)
CALCIUM SERPL-MCNC: 9.4 MG/DL (ref 8.7–10.5)
CHLORIDE SERPL-SCNC: 100 MMOL/L (ref 95–110)
CO2 SERPL-SCNC: 22 MMOL/L (ref 23–29)
CREAT SERPL-MCNC: 1.2 MG/DL (ref 0.5–1.4)
D DIMER PPP IA.FEU-MCNC: 0.36 MG/L FEU
DELSYS: ABNORMAL
DIFFERENTIAL METHOD: ABNORMAL
EOSINOPHIL # BLD AUTO: 0.7 K/UL (ref 0–0.5)
EOSINOPHIL NFR BLD: 6 % (ref 0–8)
ERYTHROCYTE [DISTWIDTH] IN BLOOD BY AUTOMATED COUNT: 13.8 % (ref 11.5–14.5)
EST. GFR  (NO RACE VARIABLE): >60 ML/MIN/1.73 M^2
GLUCOSE SERPL-MCNC: 253 MG/DL (ref 70–110)
HCO3 UR-SCNC: 31 MMOL/L (ref 24–28)
HCT VFR BLD AUTO: 48.9 % (ref 40–54)
HGB BLD-MCNC: 14.9 G/DL (ref 14–18)
IMM GRANULOCYTES # BLD AUTO: 0.03 K/UL (ref 0–0.04)
IMM GRANULOCYTES NFR BLD AUTO: 0.3 % (ref 0–0.5)
LACTATE SERPL-SCNC: 1.4 MMOL/L (ref 0.5–2.2)
LYMPHOCYTES # BLD AUTO: 2.8 K/UL (ref 1–4.8)
LYMPHOCYTES NFR BLD: 23.4 % (ref 18–48)
MCH RBC QN AUTO: 25.1 PG (ref 27–31)
MCHC RBC AUTO-ENTMCNC: 30.5 G/DL (ref 32–36)
MCV RBC AUTO: 83 FL (ref 82–98)
MONOCYTES # BLD AUTO: 1 K/UL (ref 0.3–1)
MONOCYTES NFR BLD: 8.2 % (ref 4–15)
NEUTROPHILS # BLD AUTO: 7.4 K/UL (ref 1.8–7.7)
NEUTROPHILS NFR BLD: 61.8 % (ref 38–73)
NRBC BLD-RTO: 0 /100 WBC
PCO2 BLDA: 82.2 MMHG (ref 35–45)
PH SMN: 7.18 [PH] (ref 7.35–7.45)
PLATELET # BLD AUTO: 427 K/UL (ref 150–450)
PMV BLD AUTO: 9.7 FL (ref 9.2–12.9)
PO2 BLDA: 48 MMHG (ref 40–60)
POC BE: 0 MMOL/L
POC SATURATED O2: 71 % (ref 95–100)
POC TCO2: 34 MMOL/L (ref 24–29)
POCT GLUCOSE: 241 MG/DL (ref 70–110)
POCT GLUCOSE: 372 MG/DL (ref 70–110)
POTASSIUM SERPL-SCNC: 5 MMOL/L (ref 3.5–5.1)
PROT SERPL-MCNC: 8.9 G/DL (ref 6–8.4)
RBC # BLD AUTO: 5.93 M/UL (ref 4.6–6.2)
SAMPLE: ABNORMAL
SITE: ABNORMAL
SODIUM SERPL-SCNC: 134 MMOL/L (ref 136–145)
TROPONIN I SERPL DL<=0.01 NG/ML-MCNC: 0.01 NG/ML (ref 0–0.03)
TROPONIN I SERPL DL<=0.01 NG/ML-MCNC: 0.09 NG/ML (ref 0–0.03)
WBC # BLD AUTO: 11.97 K/UL (ref 3.9–12.7)

## 2023-06-30 PROCEDURE — 80053 COMPREHEN METABOLIC PANEL: CPT | Performed by: EMERGENCY MEDICINE

## 2023-06-30 PROCEDURE — 96365 THER/PROPH/DIAG IV INF INIT: CPT

## 2023-06-30 PROCEDURE — 87040 BLOOD CULTURE FOR BACTERIA: CPT | Mod: 59 | Performed by: EMERGENCY MEDICINE

## 2023-06-30 PROCEDURE — 82803 BLOOD GASES ANY COMBINATION: CPT

## 2023-06-30 PROCEDURE — 99900035 HC TECH TIME PER 15 MIN (STAT)

## 2023-06-30 PROCEDURE — 99285 EMERGENCY DEPT VISIT HI MDM: CPT | Mod: 25

## 2023-06-30 PROCEDURE — 83605 ASSAY OF LACTIC ACID: CPT | Performed by: EMERGENCY MEDICINE

## 2023-06-30 PROCEDURE — 36415 COLL VENOUS BLD VENIPUNCTURE: CPT | Performed by: EMERGENCY MEDICINE

## 2023-06-30 PROCEDURE — 84484 ASSAY OF TROPONIN QUANT: CPT | Performed by: EMERGENCY MEDICINE

## 2023-06-30 PROCEDURE — 85025 COMPLETE CBC W/AUTO DIFF WBC: CPT | Performed by: EMERGENCY MEDICINE

## 2023-06-30 PROCEDURE — 21400001 HC TELEMETRY ROOM

## 2023-06-30 PROCEDURE — 96366 THER/PROPH/DIAG IV INF ADDON: CPT

## 2023-06-30 PROCEDURE — 93010 EKG 12-LEAD: ICD-10-PCS | Mod: ,,, | Performed by: INTERNAL MEDICINE

## 2023-06-30 PROCEDURE — 93005 ELECTROCARDIOGRAM TRACING: CPT

## 2023-06-30 PROCEDURE — 94761 N-INVAS EAR/PLS OXIMETRY MLT: CPT

## 2023-06-30 PROCEDURE — 94640 AIRWAY INHALATION TREATMENT: CPT | Mod: XB

## 2023-06-30 PROCEDURE — 25000242 PHARM REV CODE 250 ALT 637 W/ HCPCS: Performed by: INTERNAL MEDICINE

## 2023-06-30 PROCEDURE — 25000242 PHARM REV CODE 250 ALT 637 W/ HCPCS: Performed by: EMERGENCY MEDICINE

## 2023-06-30 PROCEDURE — 94640 AIRWAY INHALATION TREATMENT: CPT

## 2023-06-30 PROCEDURE — 63600175 PHARM REV CODE 636 W HCPCS: Performed by: EMERGENCY MEDICINE

## 2023-06-30 PROCEDURE — 96375 TX/PRO/DX INJ NEW DRUG ADDON: CPT

## 2023-06-30 PROCEDURE — 83880 ASSAY OF NATRIURETIC PEPTIDE: CPT | Performed by: EMERGENCY MEDICINE

## 2023-06-30 PROCEDURE — 85379 FIBRIN DEGRADATION QUANT: CPT | Performed by: EMERGENCY MEDICINE

## 2023-06-30 PROCEDURE — 63600175 PHARM REV CODE 636 W HCPCS: Performed by: INTERNAL MEDICINE

## 2023-06-30 PROCEDURE — 25000003 PHARM REV CODE 250: Performed by: INTERNAL MEDICINE

## 2023-06-30 PROCEDURE — 27000221 HC OXYGEN, UP TO 24 HOURS

## 2023-06-30 PROCEDURE — 93010 ELECTROCARDIOGRAM REPORT: CPT | Mod: ,,, | Performed by: INTERNAL MEDICINE

## 2023-06-30 RX ORDER — MAGNESIUM SULFATE HEPTAHYDRATE 40 MG/ML
2 INJECTION, SOLUTION INTRAVENOUS ONCE
Status: COMPLETED | OUTPATIENT
Start: 2023-06-30 | End: 2023-06-30

## 2023-06-30 RX ORDER — CETIRIZINE HYDROCHLORIDE 10 MG/1
10 TABLET ORAL DAILY
Status: DISCONTINUED | OUTPATIENT
Start: 2023-07-01 | End: 2023-06-30

## 2023-06-30 RX ORDER — INSULIN ASPART 100 [IU]/ML
7 INJECTION, SOLUTION INTRAVENOUS; SUBCUTANEOUS
Status: DISCONTINUED | OUTPATIENT
Start: 2023-07-01 | End: 2023-07-02 | Stop reason: HOSPADM

## 2023-06-30 RX ORDER — MUPIROCIN 20 MG/G
OINTMENT TOPICAL 2 TIMES DAILY
Status: DISCONTINUED | OUTPATIENT
Start: 2023-06-30 | End: 2023-07-02 | Stop reason: HOSPADM

## 2023-06-30 RX ORDER — IBUPROFEN 200 MG
24 TABLET ORAL
Status: DISCONTINUED | OUTPATIENT
Start: 2023-06-30 | End: 2023-07-02 | Stop reason: HOSPADM

## 2023-06-30 RX ORDER — NAPROXEN SODIUM 220 MG/1
81 TABLET, FILM COATED ORAL DAILY
Status: DISCONTINUED | OUTPATIENT
Start: 2023-07-01 | End: 2023-07-02 | Stop reason: HOSPADM

## 2023-06-30 RX ORDER — GABAPENTIN 400 MG/1
400 CAPSULE ORAL 3 TIMES DAILY
Status: DISCONTINUED | OUTPATIENT
Start: 2023-06-30 | End: 2023-07-02 | Stop reason: HOSPADM

## 2023-06-30 RX ORDER — IPRATROPIUM BROMIDE AND ALBUTEROL SULFATE 2.5; .5 MG/3ML; MG/3ML
3 SOLUTION RESPIRATORY (INHALATION)
Status: COMPLETED | OUTPATIENT
Start: 2023-06-30 | End: 2023-06-30

## 2023-06-30 RX ORDER — METHYLPREDNISOLONE SOD SUCC 125 MG
125 VIAL (EA) INJECTION
Status: COMPLETED | OUTPATIENT
Start: 2023-06-30 | End: 2023-06-30

## 2023-06-30 RX ORDER — ALBUTEROL SULFATE 2.5 MG/.5ML
2.5 SOLUTION RESPIRATORY (INHALATION)
Status: COMPLETED | OUTPATIENT
Start: 2023-06-30 | End: 2023-06-30

## 2023-06-30 RX ORDER — CETIRIZINE HYDROCHLORIDE 10 MG/1
10 TABLET ORAL DAILY
Status: DISCONTINUED | OUTPATIENT
Start: 2023-07-01 | End: 2023-07-02 | Stop reason: HOSPADM

## 2023-06-30 RX ORDER — LEVOCETIRIZINE DIHYDROCHLORIDE 5 MG/1
5 TABLET, FILM COATED ORAL NIGHTLY
COMMUNITY
Start: 2023-06-16

## 2023-06-30 RX ORDER — ALBUTEROL SULFATE 2.5 MG/.5ML
2.5 SOLUTION RESPIRATORY (INHALATION) EVERY 4 HOURS PRN
Status: DISCONTINUED | OUTPATIENT
Start: 2023-06-30 | End: 2023-07-02 | Stop reason: HOSPADM

## 2023-06-30 RX ORDER — OXYCODONE AND ACETAMINOPHEN 7.5; 325 MG/1; MG/1
1 TABLET ORAL EVERY 4 HOURS PRN
Status: DISCONTINUED | OUTPATIENT
Start: 2023-06-30 | End: 2023-07-02 | Stop reason: HOSPADM

## 2023-06-30 RX ORDER — QUETIAPINE FUMARATE 100 MG/1
100 TABLET, FILM COATED ORAL NIGHTLY
Status: DISCONTINUED | OUTPATIENT
Start: 2023-06-30 | End: 2023-07-02 | Stop reason: HOSPADM

## 2023-06-30 RX ORDER — SERTRALINE HYDROCHLORIDE 25 MG/1
25 TABLET, FILM COATED ORAL DAILY
Status: DISCONTINUED | OUTPATIENT
Start: 2023-07-01 | End: 2023-07-02 | Stop reason: HOSPADM

## 2023-06-30 RX ORDER — ENOXAPARIN SODIUM 100 MG/ML
40 INJECTION SUBCUTANEOUS EVERY 24 HOURS
Status: DISCONTINUED | OUTPATIENT
Start: 2023-06-30 | End: 2023-07-02 | Stop reason: HOSPADM

## 2023-06-30 RX ORDER — GLUCAGON 1 MG
1 KIT INJECTION
Status: DISCONTINUED | OUTPATIENT
Start: 2023-06-30 | End: 2023-07-02 | Stop reason: HOSPADM

## 2023-06-30 RX ORDER — ATORVASTATIN CALCIUM 10 MG/1
20 TABLET, FILM COATED ORAL DAILY
Status: DISCONTINUED | OUTPATIENT
Start: 2023-07-01 | End: 2023-07-02 | Stop reason: HOSPADM

## 2023-06-30 RX ORDER — LORAZEPAM 2 MG/ML
1 INJECTION INTRAMUSCULAR
Status: COMPLETED | OUTPATIENT
Start: 2023-06-30 | End: 2023-06-30

## 2023-06-30 RX ORDER — INSULIN ASPART 100 [IU]/ML
1-10 INJECTION, SOLUTION INTRAVENOUS; SUBCUTANEOUS
Status: DISCONTINUED | OUTPATIENT
Start: 2023-06-30 | End: 2023-07-02 | Stop reason: HOSPADM

## 2023-06-30 RX ORDER — IBUPROFEN 200 MG
16 TABLET ORAL
Status: DISCONTINUED | OUTPATIENT
Start: 2023-06-30 | End: 2023-07-02 | Stop reason: HOSPADM

## 2023-06-30 RX ORDER — PREDNISONE 50 MG/1
50 TABLET ORAL DAILY
Status: DISCONTINUED | OUTPATIENT
Start: 2023-07-01 | End: 2023-07-02 | Stop reason: HOSPADM

## 2023-06-30 RX ADMIN — ALBUTEROL SULFATE 2.5 MG: 2.5 SOLUTION RESPIRATORY (INHALATION) at 05:06

## 2023-06-30 RX ADMIN — LORAZEPAM 1 MG: 2 INJECTION INTRAMUSCULAR; INTRAVENOUS at 03:06

## 2023-06-30 RX ADMIN — ALBUTEROL SULFATE 2.5 MG: 2.5 SOLUTION RESPIRATORY (INHALATION) at 08:06

## 2023-06-30 RX ADMIN — IPRATROPIUM BROMIDE AND ALBUTEROL SULFATE 3 ML: .5; 3 SOLUTION RESPIRATORY (INHALATION) at 02:06

## 2023-06-30 RX ADMIN — MAGNESIUM SULFATE HEPTAHYDRATE 2 G: 40 INJECTION, SOLUTION INTRAVENOUS at 03:06

## 2023-06-30 RX ADMIN — IPRATROPIUM BROMIDE AND ALBUTEROL SULFATE 3 ML: .5; 3 SOLUTION RESPIRATORY (INHALATION) at 03:06

## 2023-06-30 RX ADMIN — GABAPENTIN 400 MG: 400 CAPSULE ORAL at 08:06

## 2023-06-30 RX ADMIN — METHYLPREDNISOLONE SODIUM SUCCINATE 125 MG: 125 INJECTION, POWDER, FOR SOLUTION INTRAMUSCULAR; INTRAVENOUS at 03:06

## 2023-06-30 RX ADMIN — MUPIROCIN: 20 OINTMENT TOPICAL at 08:06

## 2023-06-30 RX ADMIN — ENOXAPARIN SODIUM 40 MG: 40 INJECTION SUBCUTANEOUS at 05:06

## 2023-06-30 RX ADMIN — QUETIAPINE 100 MG: 100 TABLET ORAL at 08:06

## 2023-06-30 RX ADMIN — INSULIN DETEMIR 20 UNITS: 100 INJECTION, SOLUTION SUBCUTANEOUS at 08:06

## 2023-06-30 NOTE — H&P
[facility] - [place], [place] Emergency Dept  [facility] Medicine  History & Physical    Patient Name: Cali Mabry  MRN: [id number]  Patient Class: IP- Inpatient  Admission Date: 6/30/2023  Attending Physician: Tyrese Dickinson MD   Primary Care Provider: Primary Doctor No         Patient information was obtained from patient and ER records.     Subjective:     Principal Problem:COPD (chronic obstructive pulmonary disease)    Chief Complaint:   Chief Complaint   Patient presents with    Shortness of Breath     Hx copd. Pt pulled car into parking lot to call 911 due to not being able to breathe. 88% room air. +CP, anxiety. Duo neb given en route        HPI: Patient is a 56 yo M who presents with a CC of SOB.  He is agitated and not really interested in giving a history. What I can tell- he states he has been SOB for a month but could not tell me why he came to the hospital. He came to the ED for same yesterday and left AMA.  He has a history of belligerent behavior to staff and is an IV drug user. He has frequent hospitalizations for varius reasons.  Patient presents to the ED and found to have an 02 sat of 88% on RA. He has received several breathing treatments and IV steroids. He continues to have wheezing on exam.  He currently is 92% on RA. He is completely alert and oriented and refuses to wear a Bi-pap mask given his hypercapnic respiratory failure with a C02 of 80+ and PH of 7.1+.  Per nursing staff- he is somewhat improved.        Past Medical History:   Diagnosis Date    Acute on chronic pancreatitis 04/03/2016    Addiction to drug     CHF (congestive heart failure)     DKA (diabetic ketoacidoses) 02/2020    DM (diabetes mellitus), type 1     HCV (hepatitis C virus)     high VL     HTN (hypertension)     Hx of psychiatric care     Hypomagnesemia 05/25/2020    IVDU (intravenous drug user)     Heroin    Pancreatitis     Psychiatric problem     Substance abuse        Past Surgical History:   Procedure  Laterality Date    CARDIAC SURGERY  1999    stent placed. ([facility])    ESOPHAGOGASTRODUODENOSCOPY N/A 3/5/2020    Gastritis.  No H pylori.  Completed PPI for 1 month.  Procedure: EGD (ESOPHAGOGASTRODUODENOSCOPY);  Surgeon: Brinda Rene MD;  Location: [facility] ENDO;  Service: Endoscopy;  Laterality: N/A;  W421 A; [id number]    LEFT HEART CATHETERIZATION Left 9/28/2020    Procedure: Left heart cath;  Surgeon: Fito Rangel MD;  Location: [facility] CATH LAB;  Service: Cardiology;  Laterality: Left;    SHOULDER SURGERY  2013    right shoulder, spur removal.       Review of patient's allergies indicates:  No Known Allergies    Current Facility-Administered Medications on File Prior to Encounter   Medication    [DISCONTINUED] haloperidol lactate injection 5 mg    [DISCONTINUED] LORazepam injection 2 mg     Current Outpatient Medications on File Prior to Encounter   Medication Sig    albuterol (PROVENTIL/VENTOLIN HFA) 90 mcg/actuation inhaler Inhale 1-2 puffs into the lungs every 6 (six) hours as needed for Wheezing. Rescue    aspirin 81 MG Chew Take 1 tablet (81 mg total) by mouth once daily.    atorvastatin (LIPITOR) 20 MG tablet Take 1 tablet (20 mg total) by mouth once daily.    cetirizine (ZYRTEC) 10 MG tablet Take 1 tablet (10 mg total) by mouth once daily.    gabapentin (NEURONTIN) 400 MG capsule Take 400 mg by mouth 3 (three) times daily.    insulin detemir U-100, Levemir, 100 unit/mL (3 mL) SubQ InPn pen Inject 20 Units into the skin every evening.    insulin lispro 100 unit/mL pen Inject 7 Units into the skin 3 (three) times daily with meals.    levocetirizine (XYZAL) 5 MG tablet Take 5 mg by mouth every evening.    loratadine (CLARITIN) 10 mg tablet Take 10 mg by mouth.    naloxone (NARCAN) 4 mg/actuation Spry 4mg by nasal route as needed for opioid overdose; may repeat every 2-3 minutes in alternating nostrils until medical help arrives. Call 911    nitroGLYCERIN (NITROSTAT) 0.4 MG SL  tablet Place 1 tablet (0.4 mg total) under the tongue every 5 (five) minutes as needed for Chest pain. (Patient not taking: Reported on 9/16/2022)    QUEtiapine (SEROQUEL) 100 MG Tab Take 100 mg by mouth every evening.    sertraline (ZOLOFT) 25 MG tablet Take 25 mg by mouth 2 (two) times daily.    [DISCONTINUED] insulin aspart U-100 (NOVOLOG) 100 unit/mL injection Inject 1-10 Units into the skin 3 (three) times daily with meals.     Family History       Problem Relation (Age of Onset)    Asthma Mother          Tobacco Use    Smoking status: Every Day     Packs/day: 1.00     Years: 20.00     Pack years: 20.00     Types: Cigarettes     Start date: 4/3/1981     Last attempt to quit: 6/1/2020     Years since quitting: 3.0    Smokeless tobacco: Never    Tobacco comments:     states approx 1 per day.   Substance and Sexual Activity    Alcohol use: Not Currently     Alcohol/week: 0.0 standard drinks    Drug use: Yes     Types: Heroin, IV, Marijuana, Amphetamines     Comment: chronic methamphetamine user    Sexual activity: Yes     Partners: Female     Review of Systems   Constitutional:  Negative for activity change.   HENT:  Negative for congestion.    Respiratory:  Positive for shortness of breath and wheezing. Negative for chest tightness.    Cardiovascular:  Negative for chest pain and leg swelling.   Gastrointestinal:  Negative for abdominal pain.   Genitourinary:  Negative for difficulty urinating.   Musculoskeletal:  Negative for arthralgias.   Skin:  Negative for color change and pallor.   Neurological:  Negative for facial asymmetry.   Psychiatric/Behavioral:  Positive for agitation.    Objective:     Vital Signs (Most Recent):  Temp: 99 °F (37.2 °C) (06/30/23 1444)  Pulse: (!) 117 (06/30/23 1715)  Resp: (!) 29 (06/30/23 1715)  BP: 137/76 (06/30/23 1712)  SpO2: (!) 93 % (06/30/23 1712) Vital Signs (24h Range):  Temp:  [97.8 °F (36.6 °C)-99 °F (37.2 °C)] 99 °F (37.2 °C)  Pulse:  [114-142] 117  Resp:   [20-37] 29  SpO2:  [92 %-100 %] 93 %  BP: (111-232)/() 137/76     Weight: 57.2 kg (126 lb)  Body mass index is 19.16 kg/m².     Physical Exam  Vitals and nursing note reviewed.   Constitutional:       Appearance: He is ill-appearing.   HENT:      Head: Atraumatic.   Cardiovascular:      Rate and Rhythm: Regular rhythm. Tachycardia present.      Heart sounds: No murmur heard.  Pulmonary:      Effort: Pulmonary effort is normal.      Breath sounds: Wheezing present. No rales.   Abdominal:      General: There is no distension.   Skin:     General: Skin is dry.   Neurological:      Mental Status: He is alert and oriented to person, place, and time.   Psychiatric:      Comments: agitated              Significant Labs: All pertinent labs within the past 24 hours have been reviewed.  BMP:   Recent Labs   Lab 06/30/23  1500   *   *   K 5.0      CO2 22*   BUN 17   CREATININE 1.2   CALCIUM 9.4     CBC:   Recent Labs   Lab 06/30/23  1500   WBC 11.97   HGB 14.9   HCT 48.9          Significant Imaging:   CXR negative     Assessment/Plan:     * COPD (chronic obstructive pulmonary disease)  With acute exacerbation.  Some respiratory distress on arrival to ED- but now comfortable. RA 02 sats of 92% currently. I have reviewed the ABG.- acute hypoxic/hypercapnic resp. Failure. COPD pathway. Steroids, breathing treatments. Refuses Bi-pap.        Opioid abuse  Well known      Aggression  Warned patient if he is abusive to staff- that he will be asked to leave      Amphetamine use disorder, severe, dependence  Drug screen pending       Noncompliance with medication regimen  Well known      CAD (coronary artery disease)  No acute issues      Moderate protein malnutrition  Nutrition consulted. Most recent weight and BMI monitored-     Measurements:  Wt Readings from Last 1 Encounters:   06/30/23 57.2 kg (126 lb)   Body mass index is 19.16 kg/m².    Patient has been screened and assessed by  ERIKA.    Malnutrition Type:  Context:    Level:  Moderate    Malnutrition Characteristic Summary:       Interventions/Recommendations (treatment strategy):       Mild episode of recurrent major depressive disorder  Patient has persistent depression which is mild and is currently controlled. Will Continue anti-depressant medications. We will not consult psychiatry at this time. Patient does not display psychosis at this time. Continue to monitor closely and adjust plan of care as needed.        Polysubstance use disorder  Drug screen pending. Would not tell me last use      Tobacco use disorder, moderate, dependence  Dangers of cigarette smoking were reviewed with patient in detail. Patient was Counseled for 3-10 minutes. Nicotine replacement options were discussed. Nicotine replacement was discussed- not prescribed per patient's request    Chronic relapsing pancreatitis  No abdominal pain      Chronic hepatitis C  No acute issues      Hyperlipidemia  Resume statin      Anemia of chronic disease  No acute issues         VTE Risk Mitigation (From admission, onward)         Ordered     enoxaparin injection 40 mg  Daily         06/30/23 4797                   Will consult palliative care        Tyrese Singh MD  Department of Hospital Medicine  [facility] - [place] - Emergency Dept

## 2023-06-30 NOTE — ED NOTES
"Breathing labored, RR 30.  /119-MD notified. PT agitated repeatedly asking "how much longer" continues to hold treatment with mouthpiece due to refusal to wear mask.   "

## 2023-06-30 NOTE — SUBJECTIVE & OBJECTIVE
Past Medical History:   Diagnosis Date    Acute on chronic pancreatitis 04/03/2016    Addiction to drug     CHF (congestive heart failure)     DKA (diabetic ketoacidoses) 02/2020    DM (diabetes mellitus), type 1     HCV (hepatitis C virus)     high VL     HTN (hypertension)     Hx of psychiatric care     Hypomagnesemia 05/25/2020    IVDU (intravenous drug user)     Heroin    Pancreatitis     Psychiatric problem     Substance abuse        Past Surgical History:   Procedure Laterality Date    CARDIAC SURGERY  1999    stent placed. ([facility])    ESOPHAGOGASTRODUODENOSCOPY N/A 3/5/2020    Gastritis.  No H pylori.  Completed PPI for 1 month.  Procedure: EGD (ESOPHAGOGASTRODUODENOSCOPY);  Surgeon: Brinda Rene MD;  Location: [facility] ENDO;  Service: Endoscopy;  Laterality: N/A;  W421 A; x5445    LEFT HEART CATHETERIZATION Left 9/28/2020    Procedure: Left heart cath;  Surgeon: Fito Rangel MD;  Location: [facility] CATH LAB;  Service: Cardiology;  Laterality: Left;    SHOULDER SURGERY  2013    right shoulder, spur removal.       Review of patient's allergies indicates:  No Known Allergies    Current Facility-Administered Medications on File Prior to Encounter   Medication    [DISCONTINUED] haloperidol lactate injection 5 mg    [DISCONTINUED] LORazepam injection 2 mg     Current Outpatient Medications on File Prior to Encounter   Medication Sig    albuterol (PROVENTIL/VENTOLIN HFA) 90 mcg/actuation inhaler Inhale 1-2 puffs into the lungs every 6 (six) hours as needed for Wheezing. Rescue    aspirin 81 MG Chew Take 1 tablet (81 mg total) by mouth once daily.    atorvastatin (LIPITOR) 20 MG tablet Take 1 tablet (20 mg total) by mouth once daily.    cetirizine (ZYRTEC) 10 MG tablet Take 1 tablet (10 mg total) by mouth once daily.    gabapentin (NEURONTIN) 400 MG capsule Take 400 mg by mouth 3 (three) times daily.    insulin detemir U-100, Levemir, 100 unit/mL (3 mL) SubQ InPn pen Inject 20 Units into the skin every  evening.    insulin lispro 100 unit/mL pen Inject 7 Units into the skin 3 (three) times daily with meals.    levocetirizine (XYZAL) 5 MG tablet Take 5 mg by mouth every evening.    loratadine (CLARITIN) 10 mg tablet Take 10 mg by mouth.    naloxone (NARCAN) 4 mg/actuation Spry 4mg by nasal route as needed for opioid overdose; may repeat every 2-3 minutes in alternating nostrils until medical help arrives. Call 911    nitroGLYCERIN (NITROSTAT) 0.4 MG SL tablet Place 1 tablet (0.4 mg total) under the tongue every 5 (five) minutes as needed for Chest pain. (Patient not taking: Reported on 9/16/2022)    QUEtiapine (SEROQUEL) 100 MG Tab Take 100 mg by mouth every evening.    sertraline (ZOLOFT) 25 MG tablet Take 25 mg by mouth 2 (two) times daily.    [DISCONTINUED] insulin aspart U-100 (NOVOLOG) 100 unit/mL injection Inject 1-10 Units into the skin 3 (three) times daily with meals.     Family History       Problem Relation (Age of Onset)    Asthma Mother          Tobacco Use    Smoking status: Every Day     Packs/day: 1.00     Years: 20.00     Pack years: 20.00     Types: Cigarettes     Start date: 4/3/1981     Last attempt to quit: 6/1/2020     Years since quitting: 3.0    Smokeless tobacco: Never    Tobacco comments:     states approx 1 per day.   Substance and Sexual Activity    Alcohol use: Not Currently     Alcohol/week: 0.0 standard drinks    Drug use: Yes     Types: Heroin, IV, Marijuana, Amphetamines     Comment: chronic methamphetamine user    Sexual activity: Yes     Partners: Female     Review of Systems   Constitutional:  Negative for activity change.   HENT:  Negative for congestion.    Respiratory:  Positive for shortness of breath and wheezing. Negative for chest tightness.    Cardiovascular:  Negative for chest pain and leg swelling.   Gastrointestinal:  Negative for abdominal pain.   Genitourinary:  Negative for difficulty urinating.   Musculoskeletal:  Negative for arthralgias.   Skin:  Negative for  color change and pallor.   Neurological:  Negative for facial asymmetry.   Psychiatric/Behavioral:  Positive for agitation.    Objective:     Vital Signs (Most Recent):  Temp: 99 °F (37.2 °C) (06/30/23 1444)  Pulse: (!) 117 (06/30/23 1715)  Resp: (!) 29 (06/30/23 1715)  BP: 137/76 (06/30/23 1712)  SpO2: (!) 93 % (06/30/23 1712) Vital Signs (24h Range):  Temp:  [97.8 °F (36.6 °C)-99 °F (37.2 °C)] 99 °F (37.2 °C)  Pulse:  [114-142] 117  Resp:  [20-37] 29  SpO2:  [92 %-100 %] 93 %  BP: (111-232)/() 137/76     Weight: 57.2 kg (126 lb)  Body mass index is 19.16 kg/m².     Physical Exam  Vitals and nursing note reviewed.   Constitutional:       Appearance: He is ill-appearing.   HENT:      Head: Atraumatic.   Cardiovascular:      Rate and Rhythm: Regular rhythm. Tachycardia present.      Heart sounds: No murmur heard.  Pulmonary:      Effort: Pulmonary effort is normal.      Breath sounds: Wheezing present. No rales.   Abdominal:      General: There is no distension.   Skin:     General: Skin is dry.   Neurological:      Mental Status: He is alert and oriented to person, place, and time.   Psychiatric:      Comments: agitated              Significant Labs: All pertinent labs within the past 24 hours have been reviewed.  BMP:   Recent Labs   Lab 06/30/23  1500   *   *   K 5.0      CO2 22*   BUN 17   CREATININE 1.2   CALCIUM 9.4     CBC:   Recent Labs   Lab 06/30/23  1500   WBC 11.97   HGB 14.9   HCT 48.9          Significant Imaging:   CXR negative

## 2023-06-30 NOTE — ASSESSMENT & PLAN NOTE
Nutrition consulted. Most recent weight and BMI monitored-     Measurements:  Wt Readings from Last 1 Encounters:   06/30/23 57.2 kg (126 lb)   Body mass index is 19.16 kg/m².    Patient has been screened and assessed by RD.    Malnutrition Type:  Context:    Level:  Moderate    Malnutrition Characteristic Summary:       Interventions/Recommendations (treatment strategy):

## 2023-06-30 NOTE — HPI
Patient is a 56 yo M who presents with a CC of SOB.  He is agitated and not really interested in giving a history. What I can tell- he states he has been SOB for a month but could not tell me why he came to the hospital. He came to the ED for same yesterday and left AMA.  He has a history of belligerent behavior to staff and is an IV drug user. He has frequent hospitalizations for varius reasons.  Patient presents to the ED and found to have an 02 sat of 88% on RA. He has received several breathing treatments and IV steroids. He continues to have wheezing on exam.  He currently is 92% on RA. He is completely alert and oriented and refuses to wear a Bi-pap mask given his hypercapnic respiratory failure with a C02 of 80+ and PH of 7.1+.  Per nursing staff- he is somewhat improved.

## 2023-06-30 NOTE — ASSESSMENT & PLAN NOTE
With acute exacerbation.  Some respiratory distress on arrival to ED- but now comfortable. RA 02 sats of 92% currently. I have reviewed the ABG.- acute hypoxic/hypercapnic resp. Failure. COPD pathway. Steroids, breathing treatments. Refuses Bi-pap.

## 2023-06-30 NOTE — ED NOTES
PT in the middle of second treatment, agitated and states he needs a break from treatment. Pt removed treatment. PT states he does not want Bipap-Dr. Ybarra notified.

## 2023-06-30 NOTE — PLAN OF CARE
[facility] - Emergency Dept  Initial Discharge Assessment       Primary Care Provider: Patient does not know who his primary care provider is.  Contact Number for Friend:  [phone] (Nirmal)  Admission Diagnosis: COPD exacerbation [J44.1]    Admission Date: 6/30/2023  Expected Discharge Date:          Payor: [facility] MANAGED MCARE / Plan: [facility] DUAL COMPLETE HMO SNP / Product Type: Medicare Advantage /     Extended Emergency Contact Information  Primary Emergency Contact: Amarilis Easley   [place]  Mobile Phone: [phone]  Relation: Sister  Secondary Emergency Contact: Marilou coles  Mobile Phone: [phone]  Relation: Mother  Preferred language: English   needed? No              [id number] - [place] - [address]  [address]  [place] [address]  Phone: [phone] Fax: [phone]    [facility] [address] [facility]  [address]   [facility] [address]  Phone: [phone] Fax: [phone]      Initial Assessment (most recent)       Adult Discharge Assessment - 06/30/23 1742          Discharge Assessment    Assessment Type Discharge Planning Assessment     Confirmed/corrected address, phone number and insurance Yes     Confirmed Demographics Correct on Facesheet     Source of Information patient;health record   Patient only able to verify name and date of birth then became distracted---started asking for his glasses---    When was your last doctors appointment? --   Patient stated that he didn't know who his primary care provider was.    Reason For Admission COPD     People in Home parent(s)     Facility Arrived From: Home     Do you expect to return to your current living situation? --   unknown    Do you have help at home or someone to help you manage your care at home? --   unknown    Current cognitive status: Alert/Oriented     Equipment Currently Used at Home nebulizer     Readmission within 30 days? No      Patient currently being followed by outpatient case management? No     Do you have prescription coverage? Yes     Coverage [employer]     DME Needed Upon Discharge  --   TBD

## 2023-06-30 NOTE — ED PROVIDER NOTES
Encounter Date: 6/30/2023       History     Chief Complaint   Patient presents with    Shortness of Breath     Hx copd. Pt pulled car into parking lot to call 911 due to not being able to breathe. 88% room air. +CP, anxiety. Duo neb given en route     57-year-old male presenting in respiratory distress.  Per EMS, patient pulled car into a parking lot and called 911 due to being unable to breathe.  History of COPD as well as IV drug use and lung infections.  Patient is speaking in shortened sentences, repeatedly requesting water.  Patient reportedly left yesterday against medical advice.  Patient is in respiratory distress.  History limited at this time.    Review of patient's allergies indicates:  No Known Allergies  Past Medical History:   Diagnosis Date    Acute on chronic pancreatitis 04/03/2016    Addiction to drug     CHF (congestive heart failure)     DKA (diabetic ketoacidoses) 02/2020    DM (diabetes mellitus), type 1     HCV (hepatitis C virus)     high VL     HTN (hypertension)     Hx of psychiatric care     Hypomagnesemia 05/25/2020    IVDU (intravenous drug user)     Heroin    Pancreatitis     Psychiatric problem     Substance abuse      Past Surgical History:   Procedure Laterality Date    CARDIAC SURGERY  1999    stent placed. ([facility])    ESOPHAGOGASTRODUODENOSCOPY N/A 3/5/2020    Gastritis.  No H pylori.  Completed PPI for 1 month.  Procedure: EGD (ESOPHAGOGASTRODUODENOSCOPY);  Surgeon: Brinda Rene MD;  Location: [facility] ENDO;  Service: Endoscopy;  Laterality: N/A;  W421 A; [phone]    LEFT HEART CATHETERIZATION Left 9/28/2020    Procedure: Left heart cath;  Surgeon: Fito Rangel MD;  Location: [facility] CATH LAB;  Service: Cardiology;  Laterality: Left;    SHOULDER SURGERY  2013    right shoulder, spur removal.     Family History   Problem Relation Age of Onset    Asthma Mother      Social History     Tobacco Use    Smoking status: Every Day     Packs/day: 1.00     Years: 20.00     Pack  years: 20.00     Types: Cigarettes     Start date: 4/3/1981     Last attempt to quit: 6/1/2020     Years since quitting: 3.0    Smokeless tobacco: Never    Tobacco comments:     states approx 1 per day.   Substance Use Topics    Alcohol use: Not Currently     Alcohol/week: 0.0 standard drinks    Drug use: Yes     Types: Heroin, IV, Marijuana, Amphetamines     Comment: chronic methamphetamine user     Review of Systems   Unable to perform ROS: Acuity of condition     Physical Exam     Initial Vitals [06/30/23 1444]   BP Pulse Resp Temp SpO2   (!) 232/109 (!) 137 (!) 36 99 °F (37.2 °C) 99 %      MAP       --         Physical Exam    Nursing note and vitals reviewed.  Constitutional: He appears well-developed and well-nourished. He is not diaphoretic. He appears distressed.   HENT:   Head: Normocephalic and atraumatic.   Eyes: Conjunctivae and EOM are normal. Pupils are equal, round, and reactive to light. No scleral icterus.   Neck: Neck supple.   Normal range of motion.  Cardiovascular:  Regular rhythm, normal heart sounds and intact distal pulses.     Exam reveals no gallop and no friction rub.       No murmur heard.  Pulmonary/Chest: No stridor. He is in respiratory distress. He has wheezes. He has no rhonchi. He has no rales.   Severe respiratory distress with bilateral prolonged expirations and bilateral wheezing   Abdominal: Abdomen is soft. Bowel sounds are normal. He exhibits no distension. There is no abdominal tenderness. There is no rebound and no guarding.   Musculoskeletal:         General: No tenderness or edema. Normal range of motion.      Cervical back: Normal range of motion and neck supple.     Neurological: He is alert and oriented to person, place, and time. He has normal strength. No cranial nerve deficit. GCS score is 15. GCS eye subscore is 4. GCS verbal subscore is 5. GCS motor subscore is 6.   Skin: Skin is warm and dry. No rash noted.   Psychiatric: He has a normal mood and affect. His  behavior is normal.       ED Course   Critical Care    Date/Time: 6/30/2023 3:03 PM  Performed by: Real Stoll MD  Authorized by: Real Sotll MD   Direct patient critical care time: 30 minutes  Total critical care time (exclusive of procedural time) : 30 minutes  Critical care was necessary to treat or prevent imminent or life-threatening deterioration of the following conditions: respiratory failure.      Labs Reviewed   CBC W/ AUTO DIFFERENTIAL - Abnormal; Notable for the following components:       Result Value    MCH 25.1 (*)     MCHC 30.5 (*)     Eos # 0.7 (*)     All other components within normal limits   COMPREHENSIVE METABOLIC PANEL - Abnormal; Notable for the following components:    Sodium 134 (*)     CO2 22 (*)     Glucose 253 (*)     Total Protein 8.9 (*)     All other components within normal limits   ISTAT PROCEDURE - Abnormal; Notable for the following components:    POC PH 7.185 (*)     POC PCO2 82.2 (*)     POC HCO3 31.0 (*)     POC SATURATED O2 71 (*)     POC TCO2 34 (*)     All other components within normal limits   CULTURE, BLOOD   CULTURE, BLOOD   TROPONIN I   B-TYPE NATRIURETIC PEPTIDE   D DIMER, QUANTITATIVE   LACTIC ACID, PLASMA   DRUG SCREEN PANEL, URINE EMERGENCY   URINALYSIS, REFLEX TO URINE CULTURE   POCT GLUCOSE MONITORING CONTINUOUS     EKG Readings: (Independently Interpreted)   Initial Reading: No STEMI. Rhythm: Sinus Tachycardia. Heart Rate: 141. Ectopy: No Ectopy.   No ST segment elevation or depression concerning for acute ischemia.        Imaging Results              X-Ray Chest 1 View (Final result)  Result time 06/30/23 16:32:13      Final result by Dixon Easley MD (06/30/23 16:32:13)                   Impression:      No acute abnormality.  Allowing for difference in technique and degree of inspiration, there probably has been no significant interval change.      Electronically signed by: Dixon Easley MD  Date:    06/30/2023  Time:    16:32                Narrative:    EXAMINATION:  XR CHEST 1 VIEW    CLINICAL HISTORY:  Shortness of breath    TECHNIQUE:  Single frontal view of the chest was performed.    COMPARISON:  04/07/2023    FINDINGS:  Heart size and pulmonary vascularity are within normal limits.  Lungs are satisfactorily expanded.  Mild accentuation of the interstitial pattern bilaterally.  No acute consolidation.  Chronic blunting of the right costophrenic angle likely represents some pleural and/or parenchymal scarring.  Left costophrenic angle sharp.  No pneumothorax.  Skeletal structures appear intact.                                       Medications   albuterol sulfate nebulizer solution 2.5 mg (2.5 mg Nebulization Given 6/30/23 2009)   glucose chewable tablet 16 g (has no administration in time range)   glucose chewable tablet 24 g (has no administration in time range)   dextrose 50% injection 12.5 g (has no administration in time range)   dextrose 50% injection 25 g (has no administration in time range)   glucagon (human recombinant) injection 1 mg (has no administration in time range)   insulin aspart U-100 pen 1-10 Units (has no administration in time range)   enoxaparin injection 40 mg (40 mg Subcutaneous Given 6/30/23 1708)   aspirin chewable tablet 81 mg (has no administration in time range)   atorvastatin tablet 20 mg (has no administration in time range)   gabapentin capsule 400 mg (400 mg Oral Given 6/30/23 2033)   insulin detemir U-100 (Levemir) pen 20 Units (20 Units Subcutaneous Given 6/30/23 2033)   insulin aspart U-100 pen 7 Units (has no administration in time range)   cetirizine tablet 10 mg (has no administration in time range)   QUEtiapine tablet 100 mg (100 mg Oral Given 6/30/23 2033)   sertraline tablet 25 mg (has no administration in time range)   predniSONE tablet 50 mg (has no administration in time range)   oxyCODONE-acetaminophen 7.5-325 mg per tablet 1 tablet (has no administration in time range)   mupirocin 2 % ointment (  Nasal Given 6/30/23 2033)   albuterol-ipratropium 2.5 mg-0.5 mg/3 mL nebulizer solution 3 mL (3 mLs Nebulization Given 6/30/23 1507)   methylPREDNISolone sodium succinate injection 125 mg (125 mg Intravenous Given 6/30/23 1505)   magnesium sulfate 2g in water 50mL IVPB (premix) (0 g Intravenous Stopped 6/30/23 1650)   LORazepam injection 1 mg (1 mg Intravenous Given 6/30/23 1533)   albuterol sulfate nebulizer solution 2.5 mg (2.5 mg Nebulization Given by Other 6/30/23 1715)     Medical Decision Making:   Initial Assessment:   57-year-old male with a history of COPD presenting in extremis.  Patient with tight bilateral wheezing.  Tachycardic.  Hypoxic on room air.  VBG shows significant respiratory acidosis.  Nebs, BiPAP, Solu-Medrol, magnesium started.  Will get labs to include D-dimer.  Patient desperately requesting water or ice.  Patient reportedly left AMA without this.  In an attempt to keep the patient calm and for the patient's safety will provide ice chips sparingly.  ED Management:  Patient improved but with still tight wheezing.  Patient will not tolerate BiPAP.  Given improved respiratory status, believe he is safe for the floor.  Discussed with Hospital Medicine.  Will admit.  D-dimer negative.  X-ray relatively unremarkable.                        Clinical Impression:   Final diagnoses:  [R07.9] Chest pain  [R06.02] Shortness of breath  [J44.1] COPD exacerbation (Primary)        ED Disposition Condition    Admit Stable                Real Stoll MD  06/30/23 8271

## 2023-06-30 NOTE — PHARMACY MED REC
"Admission Medication History     The home medication history was taken by Caroline Arshad.    You may go to "Admission" then "Reconcile Home Medications" tabs to review and/or act upon these items.     The home medication list has been updated by the Pharmacy department.   Please read ALL comments highlighted in yellow.   Please address this information as you see fit.    Feel free to contact us if you have any questions or require assistance.  Medications listed below were obtained from: Patient/family and Analytic software- [employer]  (Not in a hospital admission)          Caroline Arshad  [phone]                 .        "

## 2023-06-30 NOTE — ASSESSMENT & PLAN NOTE
69year old female s/p CABG x 3, on 12/4/2020 enrolled in cardiac rehabilitation, seen today for initial nutrition counseling. States improving energy level since surgery, still feels numbness across her sternum,  and no concerns appetite today. Stated Nutrition Goals: control lipids through diet and exercise  Medical History: anemia, HLD, HTN, OA, PAD, fuentes's esophagus no concerns with PO stated today. Gout  Nutrition related medications/supplements: Vitamin C, D3, E by mouth daily. Iron infusions PRN- last time was in Nov 2020. Unable to tolerate fish oil due to gout. Nutrition Related Labs:   (H), HDL 30 (L), LDL 88.2 (H)    Social History/Support System: Pt is support by her spouse who eats healthfully, her daughter who has cut out dairy and gluten from her diet recently. Physical Activity: Rehabilitation 3x per week, off days pt is sedentary    Lifestyle, Culture Family Influence: Frustrated when she sits down for dinner, and comments on the healthfulness of the meal are discussed. Spouse uses the Internet to provide her with nutrition advice. Food and Nutrition Intake History:   Pt enjoys healthful foods, including fruits, vegetables, beans and nuts. Avoids meats, fish and oats because of gout. Avoids sweet treats. Prefers salty. Has has some prior nutrition eduction in the past. Portions can be challenging. Does not skip meals, and eats out 1-2x per week. Stated 1 day diet recall includes   Breakfast: [place] yogurt, and fruit. AM Snack: none  Lunch: ([facility] [employer]): orange chicken, white rice, water- meal shared with her daugther  Snack: none  Dinner: Chicken Pot Pie from [facility] + water. PM Snack: none  Beverages: 32 ounces of water + 2-4 ounces of tart cherry juice. Alcohol use: none    One day recall food recall appears high in sodium, saturated fats  One day food recall appears inadequate in non starchy vegetables, and fiber.      GI Hx: /Digestive Issues:no Drug screen pending. Would not tell me last use     complaints    Anthropometric Data:  BMI: 26.78 kg/m²  Height: 5' 3\" (1.6 m). Weight1: 68.6 kg (151 lb 3.2 oz)- experienced some intentional weight loss since surgery. Waist measurement (inches): 37 (at risk for metabolic disease)    Estimated Nutritional Needs:  Calories: MSJ x 1.2 (sedentary) for weight maintenance to start: 1200kcal/day  Protein: 70g(1g/kg BW)  Stage of Behavior Change:preparation       Nutrition Diagnosis:    Excess intake of fat related to nutrition knowledge evidenced by diet recall, food preferences and elevated TG. NUTRITION INTERVENTIONS:  1. Nutrition Prescription Recommendation:   Recommended Modifications of Meals, Snacks and Beverages:  o Include vegetables, fruits, whole grains, nuts/seeds, beans/legumes in all meals. o Less than 13 grams of saturated fat and avoid trans fat daily. o Include unsaturated fat sources (nuts, seeds, avocado). o Consuming fish 2 or more times weekly. o Daily-weekly consumption of dairy, poultry, and eggs  o Limit standard western diet foods include processed meat, processed carbohydrates and fried foods  o Water as primary beverage    2. Cardioprotective Nutrition Education:    Weight loss strategies reviewed, including sources of empty calories and portion sizes.  Educated patient on cardioprotective meal pattern/Mediterranean meal pattern including  o Guidelines for saturated fat (<7% total calories), sodium ( < 2000mg/day or follow MD recommendations), and added sugars ( < 25 grams for women/<36 grams for men) and high sources of each reviewed  o Consumption of daily fiber intake with emphasis on 7-13 grams of soluble fiber daily and food sources reviewed. o Emphasis and sources of unsaturated fats and specific benefits of omega 3 fatty acids reviewed for cardioprotective benefits.   o Demonstrated portions sizes and reviewed sources of empty calories to support weight loss goals  o Demonstrated food label reading and food verenice (Fooducate) for home use to support self efficacy goals. o Sodium education; \"salty six\" foods, food label reading, low sodium food sources and meal planning. Handouts provided for home use:    Heart healthy eating pattern with 5 day sample menu.  SessionM method    Nutrition Goals:   Weight loss of 0.5-1 pound per week, by increasing intake and fruits/vegetables to 5 per day by the end of cardiac rehab. Monitoring/Evaluation: RD to follow up with participant during rehab sessions for questions and assessment of progression toward goals. Anticipated Compliance: Good. Pt verbalizes understanding to recommendations discussed.    Barriers: None identified at this time     Amanda Whittaker, MS, RD, LD  Cardiopulmonary Rehab Dietitian

## 2023-06-30 NOTE — ED TRIAGE NOTES
PT presents to ED via EMS with SOB. Hx of COPD. Upon presentation labored, agitated, yelling at staff. Continually removing treatment. Refusing to wear oxygen mask. Yelling at staff for water.

## 2023-06-30 NOTE — ED NOTES
Telebox [phone] applied, Nathalie monitor tech notified. Confirmed visualization. Treatment in progress. Pt continually removing oxygen, spo2 93% on RA. MD aware.

## 2023-07-01 LAB
ALLENS TEST: ABNORMAL
HCO3 UR-SCNC: 26.3 MMOL/L (ref 24–28)
PCO2 BLDA: 43.7 MMHG (ref 35–45)
PH SMN: 7.39 [PH] (ref 7.35–7.45)
PO2 BLDA: 80 MMHG (ref 80–100)
POC BE: 1 MMOL/L
POC SATURATED O2: 96 % (ref 95–100)
POC TCO2: 28 MMOL/L (ref 23–27)
POCT GLUCOSE: 106 MG/DL (ref 70–110)
POCT GLUCOSE: 367 MG/DL (ref 70–110)
POCT GLUCOSE: 375 MG/DL (ref 70–110)
POCT GLUCOSE: 61 MG/DL (ref 70–110)
POCT GLUCOSE: 68 MG/DL (ref 70–110)
SAMPLE: ABNORMAL
SITE: ABNORMAL

## 2023-07-01 PROCEDURE — 21400001 HC TELEMETRY ROOM

## 2023-07-01 PROCEDURE — 25000003 PHARM REV CODE 250: Performed by: INTERNAL MEDICINE

## 2023-07-01 PROCEDURE — 63600175 PHARM REV CODE 636 W HCPCS: Performed by: INTERNAL MEDICINE

## 2023-07-01 PROCEDURE — 25000003 PHARM REV CODE 250: Performed by: HOSPITALIST

## 2023-07-01 RX ORDER — HALOPERIDOL 5 MG/ML
5 INJECTION INTRAMUSCULAR EVERY 6 HOURS PRN
Status: DISCONTINUED | OUTPATIENT
Start: 2023-07-01 | End: 2023-07-02 | Stop reason: HOSPADM

## 2023-07-01 RX ORDER — TALC
6 POWDER (GRAM) TOPICAL NIGHTLY PRN
Status: DISCONTINUED | OUTPATIENT
Start: 2023-07-01 | End: 2023-07-02 | Stop reason: HOSPADM

## 2023-07-01 RX ADMIN — INSULIN ASPART 10 UNITS: 100 INJECTION, SOLUTION INTRAVENOUS; SUBCUTANEOUS at 09:07

## 2023-07-01 RX ADMIN — OXYCODONE AND ACETAMINOPHEN 1 TABLET: 7.5; 325 TABLET ORAL at 09:07

## 2023-07-01 RX ADMIN — MUPIROCIN: 20 OINTMENT TOPICAL at 09:07

## 2023-07-01 RX ADMIN — QUETIAPINE 100 MG: 100 TABLET ORAL at 09:07

## 2023-07-01 RX ADMIN — Medication 16 G: at 04:07

## 2023-07-01 RX ADMIN — ASPIRIN 81 MG CHEWABLE TABLET 81 MG: 81 TABLET CHEWABLE at 09:07

## 2023-07-01 RX ADMIN — INSULIN ASPART 7 UNITS: 100 INJECTION, SOLUTION INTRAVENOUS; SUBCUTANEOUS at 12:07

## 2023-07-01 RX ADMIN — SERTRALINE HYDROCHLORIDE 25 MG: 25 TABLET ORAL at 09:07

## 2023-07-01 RX ADMIN — CETIRIZINE HYDROCHLORIDE 10 MG: 10 TABLET, FILM COATED ORAL at 09:07

## 2023-07-01 RX ADMIN — Medication 6 MG: at 10:07

## 2023-07-01 RX ADMIN — INSULIN ASPART 10 UNITS: 100 INJECTION, SOLUTION INTRAVENOUS; SUBCUTANEOUS at 12:07

## 2023-07-01 RX ADMIN — ENOXAPARIN SODIUM 40 MG: 40 INJECTION SUBCUTANEOUS at 04:07

## 2023-07-01 RX ADMIN — ATORVASTATIN CALCIUM 20 MG: 10 TABLET, FILM COATED ORAL at 09:07

## 2023-07-01 RX ADMIN — INSULIN ASPART 7 UNITS: 100 INJECTION, SOLUTION INTRAVENOUS; SUBCUTANEOUS at 09:07

## 2023-07-01 RX ADMIN — OXYCODONE AND ACETAMINOPHEN 1 TABLET: 7.5; 325 TABLET ORAL at 04:07

## 2023-07-01 RX ADMIN — PREDNISONE 50 MG: 50 TABLET ORAL at 09:07

## 2023-07-01 NOTE — ASSESSMENT & PLAN NOTE
With acute exacerbation.  Some respiratory distress on arrival to ED- but now comfortable. RA 02 sats of 92% currently. I have reviewed the ABG.- acute hypoxic/hypercapnic resp. Failure. COPD pathway. Steroids, breathing treatments. Refuses Bi-pap.      Clinically improved.  Will repeat ABG

## 2023-07-01 NOTE — NURSING
[facility], [facility] [place]  Nurses Note -- 4 Eyes      6/30/2023       Skin assessed on: Q Shift      [x] No Pressure Injuries Present    [x]Prevention Measures Documented    [] Yes LDA  for Pressure Injury Previously documented     [] Yes New Pressure Injury Discovered   [] LDA for New Pressure Injury Added      Attending RN:  Bria Bustillos RN     Second RN:  Radha Fishman LPN

## 2023-07-01 NOTE — SUBJECTIVE & OBJECTIVE
Interval History: feels better     Review of Systems   Constitutional:  Negative for activity change.   HENT:  Negative for congestion.    Respiratory:  Negative for chest tightness, shortness of breath and wheezing.    Cardiovascular:  Negative for chest pain and leg swelling.   Gastrointestinal:  Negative for abdominal pain.   Genitourinary:  Negative for difficulty urinating.   Musculoskeletal:  Negative for arthralgias.   Skin:  Negative for color change and pallor.   Neurological:  Negative for facial asymmetry.   Psychiatric/Behavioral:  Positive for agitation.    Objective:     Vital Signs (Most Recent):  Temp: 97.7 °F (36.5 °C) (07/01/23 0759)  Pulse: 92 (07/01/23 0759)  Resp: 20 (07/01/23 0759)  BP: 113/62 (07/01/23 0555)  SpO2: 95 % (07/01/23 0759) Vital Signs (24h Range):  Temp:  [97.5 °F (36.4 °C)-99 °F (37.2 °C)] 97.7 °F (36.5 °C)  Pulse:  [] 92  Resp:  [18-37] 20  SpO2:  [92 %-100 %] 95 %  BP: (111-232)/() 113/62     Weight: 57.2 kg (126 lb)  Body mass index is 19.16 kg/m².    Intake/Output Summary (Last 24 hours) at 7/1/2023 0922  Last data filed at 6/30/2023 2157  Gross per 24 hour   Intake 290 ml   Output --   Net 290 ml         Physical Exam        Significant Labs: All pertinent labs within the past 24 hours have been reviewed.  BMP:   Recent Labs   Lab 06/30/23  1500   *   *   K 5.0      CO2 22*   BUN 17   CREATININE 1.2   CALCIUM 9.4     CBC:   Recent Labs   Lab 06/30/23  1500   WBC 11.97   HGB 14.9   HCT 48.9          Significant Imaging:

## 2023-07-01 NOTE — NURSING
[facility], [facility] [place]  Nurses Note -- 4 Eyes      6/30/2023      Skin assessed on: Q Shift      [x] No Pressure Injuries Present    [x]Prevention Measures Documented    [] Yes LDA  for Pressure Injury Previously documented     [] Yes New Pressure Injury Discovered   [] LDA for New Pressure Injury Added      Attending RN:  Silvia Dorantes RN     Second RN:  Bria HATCH RN

## 2023-07-01 NOTE — NURSING
[facility], [facility] [place]  Nurses Note -- 4 Eyes      7/1/2023       Skin assessed on: Q Shift      [x] No Pressure Injuries Present    [x]Prevention Measures Documented    [] Yes LDA  for Pressure Injury Previously documented     [] Yes New Pressure Injury Discovered   [] LDA for New Pressure Injury Added      Attending RN:  Flower Pennington RN     Second RN:  RITA Vega

## 2023-07-01 NOTE — PLAN OF CARE
Problem: Adjustment to Illness COPD (Chronic Obstructive Pulmonary Disease)  Goal: Optimal Chronic Illness Coping  Outcome: Ongoing, Progressing  Intervention: Support and Optimize Psychosocial Response  Flowsheets (Taken 7/1/2023 0739)  Supportive Measures: active listening utilized     Problem: Infection COPD (Chronic Obstructive Pulmonary Disease)  Goal: Absence of Infection Signs and Symptoms  Outcome: Ongoing, Progressing  Intervention: Prevent or Manage Infection  Flowsheets (Taken 7/1/2023 0739)  Infection Management: aseptic technique maintained     Problem: Coping Ineffective  Goal: Effective Coping  Outcome: Ongoing, Progressing  Intervention: Support and Enhance Coping Strategies  Flowsheets (Taken 7/1/2023 0739)  Supportive Measures: active listening utilized

## 2023-07-01 NOTE — HOSPITAL COURSE
Patient admitted for COPD exacerbation. He quickly improved. ABG normalized. On day of discharge- he was requesting to go home. On RA.  Will send home on prednisone for 4 days. Diet- ADA 2000 low NA diet. Follow up with Pulmonary in one week. Activity as tolerated.

## 2023-07-01 NOTE — NURSING
0544 Patient noted to be shouting at staff member and yelling profanities. Security contacted.   0557  at bedside addressing patient.

## 2023-07-01 NOTE — PROGRESS NOTES
[facility] Medicine  Progress Note    Patient Name: Cali Mabry  MRN: [id number]  Patient Class: IP- Inpatient   Admission Date: 6/30/2023  Length of Stay: 1 days  Attending Physician: Tyrese Dickinson MD  Primary Care Provider: Primary Doctor No        Subjective:     Principal Problem:COPD (chronic obstructive pulmonary disease)        HPI:  Patient is a 58 yo M who presents with a CC of SOB.  He is agitated and not really interested in giving a history. What I can tell- he states he has been SOB for a month but could not tell me why he came to the hospital. He came to the ED for same yesterday and left AMA.  He has a history of belligerent behavior to staff and is an IV drug user. He has frequent hospitalizations for varius reasons.  Patient presents to the ED and found to have an 02 sat of 88% on RA. He has received several breathing treatments and IV steroids. He continues to have wheezing on exam.  He currently is 92% on RA. He is completely alert and oriented and refuses to wear a Bi-pap mask given his hypercapnic respiratory failure with a C02 of 80+ and PH of 7.1+.  Per nursing staff- he is somewhat improved.        Overview/Hospital Course:  Patient admitted for COPD exacerbation         Interval History: feels better     Review of Systems   Constitutional:  Negative for activity change.   HENT:  Negative for congestion.    Respiratory:  Negative for chest tightness, shortness of breath and wheezing.    Cardiovascular:  Negative for chest pain and leg swelling.   Gastrointestinal:  Negative for abdominal pain.   Genitourinary:  Negative for difficulty urinating.   Musculoskeletal:  Negative for arthralgias.   Skin:  Negative for color change and pallor.   Neurological:  Negative for facial asymmetry.   Psychiatric/Behavioral:  Positive for agitation.    Objective:     Vital Signs (Most Recent):  Temp: 97.7 °F (36.5 °C) (07/01/23 0759)  Pulse: 92 (07/01/23 0759)  Resp: 20  (07/01/23 0759)  BP: 113/62 (07/01/23 0555)  SpO2: 95 % (07/01/23 0759) Vital Signs (24h Range):  Temp:  [97.5 °F (36.4 °C)-99 °F (37.2 °C)] 97.7 °F (36.5 °C)  Pulse:  [] 92  Resp:  [18-37] 20  SpO2:  [92 %-100 %] 95 %  BP: (111-232)/() 113/62     Weight: 57.2 kg (126 lb)  Body mass index is 19.16 kg/m².    Intake/Output Summary (Last 24 hours) at 7/1/2023 0922  Last data filed at 6/30/2023 2157  Gross per 24 hour   Intake 290 ml   Output --   Net 290 ml         Physical Exam        Significant Labs: All pertinent labs within the past 24 hours have been reviewed.  BMP:   Recent Labs   Lab 06/30/23  1500   *   *   K 5.0      CO2 22*   BUN 17   CREATININE 1.2   CALCIUM 9.4     CBC:   Recent Labs   Lab 06/30/23  1500   WBC 11.97   HGB 14.9   HCT 48.9          Significant Imaging:       Assessment/Plan:      * COPD (chronic obstructive pulmonary disease)  With acute exacerbation.  Some respiratory distress on arrival to ED- but now comfortable. RA 02 sats of 92% currently. I have reviewed the ABG.- acute hypoxic/hypercapnic resp. Failure. COPD pathway. Steroids, breathing treatments. Refuses Bi-pap.      Clinically improved.  Will repeat ABG       Opioid abuse  Well known      Aggression  Warned patient if he is abusive to staff- that he will be asked to leave      Amphetamine use disorder, severe, dependence  Drug screen pending       Noncompliance with medication regimen  Well known      CAD (coronary artery disease)  No acute issues      Moderate protein malnutrition  Nutrition consulted. Most recent weight and BMI monitored-     Measurements:  Wt Readings from Last 1 Encounters:   06/30/23 57.2 kg (126 lb)   Body mass index is 19.16 kg/m².    Patient has been screened and assessed by RD.    Malnutrition Type:  Context:    Level:  Moderate    Malnutrition Characteristic Summary:       Interventions/Recommendations (treatment strategy):       Mild episode of recurrent major  depressive disorder  Patient has persistent depression which is mild and is currently controlled. Will Continue anti-depressant medications. We will not consult psychiatry at this time. Patient does not display psychosis at this time. Continue to monitor closely and adjust plan of care as needed.        Polysubstance use disorder  Drug screen pending. Would not tell me last use      Tobacco use disorder, moderate, dependence  Dangers of cigarette smoking were reviewed with patient in detail. Patient was Counseled for 3-10 minutes. Nicotine replacement options were discussed. Nicotine replacement was discussed- not prescribed per patient's request    Chronic relapsing pancreatitis  No abdominal pain      Chronic hepatitis C  No acute issues      Hyperlipidemia  Resume statin      Anemia of chronic disease  No acute issues         VTE Risk Mitigation (From admission, onward)         Ordered     enoxaparin injection 40 mg  Daily         06/30/23 2936                Discharge Planning   ROLA:      Code Status: Prior   Is the patient medically ready for discharge?:     Reason for patient still in hospital (select all that apply): Patient unstable                     Tyrese Singh MD  Department of Hospital Medicine   [facility]

## 2023-07-01 NOTE — RESPIRATORY THERAPY
PRN aerosol treatment given as requested. Patient will call if he wants a PRN aerosol treatment. He states to not awake him.

## 2023-07-01 NOTE — NURSING
Received patient from ER to room via stretcher  Patient accompanied by transport. Transferred patient to bed. Evaluated general patient appearance and condition. Admit assessment initiated. Tele monitoring initiated. Noted 20 g to right and left forearm.   No apparent distress noted at this time.

## 2023-07-02 VITALS
HEIGHT: 68 IN | TEMPERATURE: 97 F | RESPIRATION RATE: 19 BRPM | WEIGHT: 130.06 LBS | SYSTOLIC BLOOD PRESSURE: 118 MMHG | BODY MASS INDEX: 19.71 KG/M2 | HEART RATE: 84 BPM | DIASTOLIC BLOOD PRESSURE: 56 MMHG | OXYGEN SATURATION: 94 %

## 2023-07-02 LAB
POCT GLUCOSE: 235 MG/DL (ref 70–110)
POCT GLUCOSE: 92 MG/DL (ref 70–110)

## 2023-07-02 PROCEDURE — 25000003 PHARM REV CODE 250: Performed by: INTERNAL MEDICINE

## 2023-07-02 PROCEDURE — 63600175 PHARM REV CODE 636 W HCPCS: Performed by: INTERNAL MEDICINE

## 2023-07-02 RX ORDER — PREDNISONE 20 MG/1
40 TABLET ORAL DAILY
Qty: 8 TABLET | Refills: 0 | Status: SHIPPED | OUTPATIENT
Start: 2023-07-02 | End: 2023-07-06

## 2023-07-02 RX ADMIN — ATORVASTATIN CALCIUM 20 MG: 10 TABLET, FILM COATED ORAL at 08:07

## 2023-07-02 RX ADMIN — SERTRALINE HYDROCHLORIDE 25 MG: 25 TABLET ORAL at 08:07

## 2023-07-02 RX ADMIN — CETIRIZINE HYDROCHLORIDE 10 MG: 10 TABLET, FILM COATED ORAL at 08:07

## 2023-07-02 RX ADMIN — INSULIN ASPART 7 UNITS: 100 INJECTION, SOLUTION INTRAVENOUS; SUBCUTANEOUS at 08:07

## 2023-07-02 RX ADMIN — PREDNISONE 50 MG: 50 TABLET ORAL at 08:07

## 2023-07-02 RX ADMIN — ASPIRIN 81 MG CHEWABLE TABLET 81 MG: 81 TABLET CHEWABLE at 08:07

## 2023-07-02 RX ADMIN — INSULIN ASPART 4 UNITS: 100 INJECTION, SOLUTION INTRAVENOUS; SUBCUTANEOUS at 08:07

## 2023-07-02 NOTE — SUBJECTIVE & OBJECTIVE
Interval History:  On RA. Wants to go home       Review of Systems   Constitutional:  Negative for activity change.   HENT:  Negative for congestion.    Respiratory:  Negative for chest tightness, shortness of breath and wheezing.    Cardiovascular:  Negative for chest pain and leg swelling.   Gastrointestinal:  Negative for abdominal pain.   Genitourinary:  Negative for difficulty urinating.   Musculoskeletal:  Negative for arthralgias.   Skin:  Negative for color change and pallor.   Neurological:  Negative for facial asymmetry.   Psychiatric/Behavioral:  Positive for agitation.    Objective:     Vital Signs (Most Recent):  Temp: 98 °F (36.7 °C) (07/01/23 2055)  Pulse: 85 (07/02/23 0618)  Resp: 18 (07/02/23 0618)  BP: 126/61 (07/02/23 0618)  SpO2: 95 % (07/02/23 0618) Vital Signs (24h Range):  Temp:  [97.7 °F (36.5 °C)-98.1 °F (36.7 °C)] 98 °F (36.7 °C)  Pulse:  [85-98] 85  Resp:  [18-20] 18  SpO2:  [95 %-97 %] 95 %  BP: (126-161)/(61-82) 126/61     Weight: 59 kg (130 lb 1.1 oz)  Body mass index is 19.78 kg/m².    Intake/Output Summary (Last 24 hours) at 7/2/2023 0643  Last data filed at 7/1/2023 1602  Gross per 24 hour   Intake 600 ml   Output --   Net 600 ml         Physical Exam  Constitutional:       Appearance: Normal appearance. He is not ill-appearing.   Pulmonary:      Breath sounds: No wheezing.   Neurological:      Mental Status: He is alert and oriented to person, place, and time.           Significant Labs: All pertinent labs within the past 24 hours have been reviewed.  BMP:   Recent Labs   Lab 06/30/23  1500   *   *   K 5.0      CO2 22*   BUN 17   CREATININE 1.2   CALCIUM 9.4     CBC:   Recent Labs   Lab 06/30/23  1500   WBC 11.97   HGB 14.9   HCT 48.9          Significant Imaging: I have reviewed all pertinent imaging results/findings within the past 24 hours.

## 2023-07-02 NOTE — ASSESSMENT & PLAN NOTE
With acute exacerbation.  Some respiratory distress on arrival to ED- but now comfortable. RA 02 sats of 92% currently. I have reviewed the ABG.- acute hypoxic/hypercapnic resp. Failure. COPD pathway. Steroids, breathing treatments. Refuses Bi-pap.      Clinically improved.  Will repeat ABG - normal.     On RA. Will d/c to home with pulmonary follow up

## 2023-07-02 NOTE — PLAN OF CARE
07/02/23 0807   Final Note   Assessment Type Final Discharge Note   Anticipated Discharge Disposition Home  (Pulmonology Hospital Follow-up)   What phone number can be called within the next 1-3 days to see how you are doing after discharge?   ([phone])   Hospital Resources/Appts/Education Provided Appointments scheduled by Navigator/Coordinator;Provided education on problems/symptoms using teachback;Provided patient/caregiver with written discharge plan information;Appointments scheduled and added to AVS   Post-Acute Status   Post-Acute Authorization Other  (Home; pulomonology follow-up)   Coverage [facility] Medicare   Other Status No Post-Acute Service Needs   Discharge Delays None known at this time

## 2023-07-02 NOTE — PLAN OF CARE
07/02/23 0810   Medicare Message   Important Message from Medicare regarding Discharge Appeal Rights Explained to patient/caregiver;Signed/date by patient/caregiver;Given to patient/caregiver;Other (comments)   Date IMM was signed 07/02/23   Time IMM was signed 0810     Patient verbally expressed understanding of IMM Notice and Medicare rights; signed, dated, added time.

## 2023-07-02 NOTE — NURSING
[facility]  Nurses Note -- 4 Eyes      7/1/2023       Skin assessed on: Q Shift      [x] No Pressure Injuries Present    [x]Prevention Measures Documented    [] Yes LDA  for Pressure Injury Previously documented     [] Yes New Pressure Injury Discovered   [] LDA for New Pressure Injury Added      Attending RN:  Mariah Blevins RN     Second RN:  RITA Crenshaw

## 2023-07-02 NOTE — PLAN OF CARE
Patient is ready and clear for discharge from Case Management's perspective; informed patient's nurse, April. Discussed and provided patient with written discharge instruction and education.

## 2023-07-02 NOTE — PLAN OF CARE
Problem: Adjustment to Illness COPD (Chronic Obstructive Pulmonary Disease)  Goal: Optimal Chronic Illness Coping  Outcome: Met     Problem: Functional Ability Impaired COPD (Chronic Obstructive Pulmonary Disease)  Goal: Optimal Level of Functional [place]  Outcome: Met     Problem: Infection COPD (Chronic Obstructive Pulmonary Disease)  Goal: Absence of Infection Signs and Symptoms  Outcome: Met     Problem: Oral Intake Inadequate COPD (Chronic Obstructive Pulmonary Disease)  Goal: Improved Nutrition Intake  Outcome: Met     Problem: Respiratory Compromise COPD (Chronic Obstructive Pulmonary Disease)  Goal: Effective Oxygenation and Ventilation  Outcome: Met     Problem: Adult Inpatient Plan of Care  Goal: Plan of Care Review  Outcome: Met  Goal: Patient-Specific Goal (Individualized)  Outcome: Met  Goal: Absence of Hospital-Acquired Illness or Injury  Outcome: Met  Goal: Optimal Comfort and Wellbeing  Outcome: Met  Goal: Readiness for Transition of Care  Outcome: Met     Problem: Coping Ineffective  Goal: Effective Coping  Outcome: Met

## 2023-07-02 NOTE — PLAN OF CARE
07/02/23 0805   Post-Acute Status   Post-Acute Authorization Other  (Home; pulmonology hospital follow-up)   Coverage [facility] Medicare   Other Status No Post-Acute Service Needs   Hospital Resources/Appts/Education Provided Provided patient/caregiver with written discharge plan information;Appointments scheduled and added to AVS;Appointments scheduled by Navigator/Coordinator;Provided education on problems/symptoms using teachback   Discharge Delays None known at this time   Discharge Plan   Discharge Plan A Home with family  (Follow-up)   Discharge Plan B Home with family  (Follow-up)

## 2023-07-02 NOTE — PROGRESS NOTES
[facility] Medicine  Progress Note    Patient Name: Cali Marby  MRN: [id number]  Patient Class: IP- Inpatient   Admission Date: 6/30/2023  Length of Stay: 2 days  Attending Physician: Tyrese Dickinson MD  Primary Care Provider: Primary Doctor No        Subjective:     Principal Problem:COPD (chronic obstructive pulmonary disease)        HPI:  Patient is a 56 yo M who presents with a CC of SOB.  He is agitated and not really interested in giving a history. What I can tell- he states he has been SOB for a month but could not tell me why he came to the hospital. He came to the ED for same yesterday and left AMA.  He has a history of belligerent behavior to staff and is an IV drug user. He has frequent hospitalizations for varius reasons.  Patient presents to the ED and found to have an 02 sat of 88% on RA. He has received several breathing treatments and IV steroids. He continues to have wheezing on exam.  He currently is 92% on RA. He is completely alert and oriented and refuses to wear a Bi-pap mask given his hypercapnic respiratory failure with a C02 of 80+ and PH of 7.1+.  Per nursing staff- he is somewhat improved.        Overview/Hospital Course:  Patient admitted for COPD exacerbation. He quickly improved. ABG normalized. On day of discharge- he was requesting to go home. On RA.  Will send home on prednisone for 3 days         Interval History:  On RA. Wants to go home       Review of Systems   Constitutional:  Negative for activity change.   HENT:  Negative for congestion.    Respiratory:  Negative for chest tightness, shortness of breath and wheezing.    Cardiovascular:  Negative for chest pain and leg swelling.   Gastrointestinal:  Negative for abdominal pain.   Genitourinary:  Negative for difficulty urinating.   Musculoskeletal:  Negative for arthralgias.   Skin:  Negative for color change and pallor.   Neurological:  Negative for facial asymmetry.   Psychiatric/Behavioral:   Positive for agitation.    Objective:     Vital Signs (Most Recent):  Temp: 98 °F (36.7 °C) (07/01/23 2055)  Pulse: 85 (07/02/23 0618)  Resp: 18 (07/02/23 0618)  BP: 126/61 (07/02/23 0618)  SpO2: 95 % (07/02/23 0618) Vital Signs (24h Range):  Temp:  [97.7 °F (36.5 °C)-98.1 °F (36.7 °C)] 98 °F (36.7 °C)  Pulse:  [85-98] 85  Resp:  [18-20] 18  SpO2:  [95 %-97 %] 95 %  BP: (126-161)/(61-82) 126/61     Weight: 59 kg (130 lb 1.1 oz)  Body mass index is 19.78 kg/m².    Intake/Output Summary (Last 24 hours) at 7/2/2023 0643  Last data filed at 7/1/2023 1602  Gross per 24 hour   Intake 600 ml   Output --   Net 600 ml         Physical Exam  Constitutional:       Appearance: Normal appearance. He is not ill-appearing.   Pulmonary:      Breath sounds: No wheezing.   Neurological:      Mental Status: He is alert and oriented to person, place, and time.           Significant Labs: All pertinent labs within the past 24 hours have been reviewed.  BMP:   Recent Labs   Lab 06/30/23  1500   *   *   K 5.0      CO2 22*   BUN 17   CREATININE 1.2   CALCIUM 9.4     CBC:   Recent Labs   Lab 06/30/23  1500   WBC 11.97   HGB 14.9   HCT 48.9          Significant Imaging: I have reviewed all pertinent imaging results/findings within the past 24 hours.      Assessment/Plan:      * COPD (chronic obstructive pulmonary disease)  With acute exacerbation.  Some respiratory distress on arrival to ED- but now comfortable. RA 02 sats of 92% currently. I have reviewed the ABG.- acute hypoxic/hypercapnic resp. Failure. COPD pathway. Steroids, breathing treatments. Refuses Bi-pap.      Clinically improved.  Will repeat ABG - normal.     On RA. Will d/c to home with pulmonary follow up      Opioid abuse  Well known      Aggression  Warned patient if he is abusive to staff- that he will be asked to leave      Amphetamine use disorder, severe, dependence  Drug screen pending       Noncompliance with medication regimen  Well  known      CAD (coronary artery disease)  No acute issues      Moderate protein malnutrition  Nutrition consulted. Most recent weight and BMI monitored-     Measurements:  Wt Readings from Last 1 Encounters:   06/30/23 57.2 kg (126 lb)   Body mass index is 19.16 kg/m².    Patient has been screened and assessed by RD.    Malnutrition Type:  Context:    Level:  Moderate    Malnutrition Characteristic Summary:       Interventions/Recommendations (treatment strategy):       Mild episode of recurrent major depressive disorder  Patient has persistent depression which is mild and is currently controlled. Will Continue anti-depressant medications. We will not consult psychiatry at this time. Patient does not display psychosis at this time. Continue to monitor closely and adjust plan of care as needed.        Polysubstance use disorder  Drug screen pending. Would not tell me last use      Tobacco use disorder, moderate, dependence  Dangers of cigarette smoking were reviewed with patient in detail. Patient was Counseled for 3-10 minutes. Nicotine replacement options were discussed. Nicotine replacement was discussed- not prescribed per patient's request    Chronic relapsing pancreatitis  No abdominal pain      Chronic hepatitis C  No acute issues      Hyperlipidemia  Resume statin      Anemia of chronic disease  No acute issues         VTE Risk Mitigation (From admission, onward)         Ordered     enoxaparin injection 40 mg  Daily         06/30/23 0365                Discharge Planning   ROLA:      Code Status: Full Code   Is the patient medically ready for discharge?:     Reason for patient still in hospital (select all that apply): Patient unstable                     Tyrese Singh MD  Department of Hospital Medicine   [facility]

## 2023-07-02 NOTE — DISCHARGE SUMMARY
[facility] Medicine  Discharge Summary      Patient Name: Cali Mabry  MRN: [id number]  [facility]: [id number]  Patient Class: IP- Inpatient  Admission Date: 6/30/2023  Hospital Length of Stay: 2 days  Discharge Date and Time:  07/02/2023 6:47 AM  Attending Physician: Nan Dickinson MD   Discharging Provider: Nan Dickinson MD  Primary Care Provider: Primary Doctor No    Primary Care Team: Networked reference to record PCT     HPI:   Patient is a 58 yo M who presents with a CC of SOB.  He is agitated and not really interested in giving a history. What I can tell- he states he has been SOB for a month but could not tell me why he came to the hospital. He came to the ED for same yesterday and left AMA.  He has a history of belligerent behavior to staff and is an IV drug user. He has frequent hospitalizations for varius reasons.  Patient presents to the ED and found to have an 02 sat of 88% on RA. He has received several breathing treatments and IV steroids. He continues to have wheezing on exam.  He currently is 92% on RA. He is completely alert and oriented and refuses to wear a Bi-pap mask given his hypercapnic respiratory failure with a C02 of 80+ and PH of 7.1+.  Per nursing staff- he is somewhat improved.        * No surgery found *      Hospital Course:   Patient admitted for COPD exacerbation. He quickly improved. ABG normalized. On day of discharge- he was requesting to go home. On RA.  Will send home on prednisone for 4 days. Diet- ADA 2000 low NA diet. Follow up with Pulmonary in one week. Activity as tolerated.            Goals of Care Treatment Preferences:  Code Status: Full Code      Consults:   Consults (From admission, onward)        Status Ordering Provider     Inpatient consult to Palliative Care  Once        Provider:  (Not yet assigned)    Acknowledged NAN DICKINSON          No new Assessment & Plan notes have been filed under this hospital service since the  last note was generated.  Service: Hospital Medicine    Final Active Diagnoses:    Diagnosis Date Noted POA    PRINCIPAL PROBLEM:  COPD (chronic obstructive pulmonary disease) [J44.9] 08/08/2020 Yes    Opioid abuse [F11.10] 08/05/2018 Yes     Chronic    Aggression [R46.89] 01/28/2023 Yes    Amphetamine use disorder, severe, dependence [F15.20] 09/26/2022 Yes    Noncompliance with medication regimen [Z91.148] 09/17/2022 Not Applicable    CAD (coronary artery disease) [I25.10] 08/13/2020 Yes     Chronic    Moderate protein malnutrition [E44.0] 04/23/2020 Yes    Mild episode of recurrent major depressive disorder [F33.0] 04/21/2020 Yes    Polysubstance use disorder [F19.90] 03/12/2019 Yes    Chronic hepatitis C [B18.2] 11/23/2017 Yes     Chronic    Chronic relapsing pancreatitis [K86.1] 11/23/2017 Yes     Chronic    Hyperlipidemia [E78.5] 11/23/2017 Yes     Chronic    Tobacco use disorder, moderate, dependence [F17.200] 11/23/2017 Yes     Chronic    Anemia of chronic disease [D63.8] 04/03/2016 Yes     Chronic      Problems Resolved During this Admission:       Discharged Condition: good    Disposition:     Follow Up:   Follow-up Information     Pito Mendoza MD Follow up in 1 week(s).    Specialties: Pulmonary Disease, Sleep Medicine  Contact information:  [address]  [address] [place] [address] [id number] [phone]                       Patient Instructions:   No discharge procedures on file.    Significant Diagnostic Studies: N/A    Pending Diagnostic Studies:     None         Medications:  Reconciled Home Medications:      Medication List      START taking these medications    predniSONE 20 MG tablet  Commonly known as: DELTASONE  Take 2 tablets (40 mg total) by mouth once daily. for 4 days        CONTINUE taking these medications    albuterol 90 mcg/actuation inhaler  Commonly known as: PROVENTIL/VENTOLIN HFA  Inhale 1-2 puffs into the lungs every 6 (six) hours as needed for Wheezing. Rescue     aspirin  81 MG Chew  Take 1 tablet (81 mg total) by mouth once daily.     atorvastatin 20 MG tablet  Commonly known as: LIPITOR  Take 1 tablet (20 mg total) by mouth once daily.     cetirizine 10 MG tablet  Commonly known as: ZYRTEC  Take 1 tablet (10 mg total) by mouth once daily.     gabapentin 400 MG capsule  Commonly known as: NEURONTIN  Take 400 mg by mouth 3 (three) times daily.     insulin detemir U-100 (Levemir) 100 unit/mL (3 mL) Inpn pen  Inject 20 Units into the skin every evening.     insulin lispro 100 unit/mL pen  Inject 7 Units into the skin 3 (three) times daily with meals.     levocetirizine 5 MG tablet  Commonly known as: XYZAL  Take 5 mg by mouth every evening.     loratadine 10 mg tablet  Commonly known as: CLARITIN  Take 10 mg by mouth.     naloxone 4 mg/actuation Spry  Commonly known as: NARCAN  4mg by nasal route as needed for opioid overdose; may repeat every 2-3 minutes in alternating nostrils until medical help arrives. Call 911     nitroGLYCERIN 0.4 MG SL tablet  Commonly known as: NITROSTAT  Place 1 tablet (0.4 mg total) under the tongue every 5 (five) minutes as needed for Chest pain.     QUEtiapine 100 MG Tab  Commonly known as: SEROQUEL  Take 100 mg by mouth every evening.     sertraline 25 MG tablet  Commonly known as: ZOLOFT  Take 25 mg by mouth 2 (two) times daily.            Indwelling Lines/Drains at time of discharge:   Lines/Drains/Airways     None                 Time spent on the discharge of patient:  > 35 minutes         Tyrese Singh MD  Department of Hospital Medicine  [facility]

## 2023-07-02 NOTE — NURSING
Patient asked not to be disturbed so he can sleep, requested no vitals for midnight rounds.8pm vitals stable, O2 sat 96% on RA.

## 2023-07-02 NOTE — NURSING
Pt's mother arrived for . Pt belligerent with mother, security called. Mother still wanting to take pt home. Pt and mother seperated. Mother waiting in waiting room. Discharge instructions explained to the patient. All questions answered. Pt aware to  discharge medications from [facility]. PIVs and telemetry removed. Transport requested for wheelchair transport to ride.

## 2023-07-02 NOTE — NURSING
[facility] [place]  Nurses Note -- 4 Eyes      7/2/2023       Skin assessed on: Q Shift      [x] No Pressure Injuries Present    []Prevention Measures Documented    [] Yes LDA  for Pressure Injury Previously documented     [] Yes New Pressure Injury Discovered   [] LDA for New Pressure Injury Added      Attending RN:  Flower Pennington RN     Second RN:  RITA Lemus

## 2023-07-03 ENCOUNTER — PATIENT OUTREACH (OUTPATIENT)
Dept: ADMINISTRATIVE | Facility: CLINIC | Age: 58
End: 2023-07-03
Payer: MEDICARE

## 2023-07-03 NOTE — PROGRESS NOTES
C3 nurse attempted to contact Cali Mabry  for a TCC post hospital discharge follow up call. The patient is unable to complete tcc call at this time. The patient requests callback. The patient does not have a scheduled HOSFU appointment, and the pt does not have an [facility] PCP.

## 2023-07-04 LAB
BACTERIA BLD CULT: NORMAL
BACTERIA BLD CULT: NORMAL

## 2023-07-05 NOTE — PROGRESS NOTES
2nd Attempt made to reach patient for TCC call. Left voicemail please call [phone] leave first name, last name, and  Sheryl will return your call.

## 2023-07-05 NOTE — PROGRESS NOTES
3rd Attempt made to reach patient for TCC call. Spoke with mother, LM please call [phone] leave first name, last name, and  Sheryl will return your call.

## 2023-07-06 NOTE — PROGRESS NOTES
C3 nurse attempted to contact patient for a TCC post hospital discharge follow-up call. The patient declined call at this time.     Pt became agitated upon request of name spelling. Pt refused spelling and disconnected call.

## 2023-07-18 NOTE — PROGRESS NOTES
"[facility] Hospital Medicine  Progress Note    Patient Name: Cali Mabry  MRN: [id number]  Patient Class: IP- Inpatient   Admission Date: 3/26/2020  Length of Stay: 6 days  Attending Physician: Jered Diehl MD  Primary Care Provider: Primary Doctor No    Hospital Medicine Team: [facility] HOSP MED 1 Matthew Blackwell MD    Subjective:     Principal Problem:Acute respiratory failure with hypoxia        HPI:  Cali Mabry is a 54 y.o. with DM II, hepatitis C, HTN, polysubstance abuse/IVDU, pancreatitis who presents, after transfer from [facility], with a 2 day history of chest pain and shortness of breath.  Chest pain is centralized and just described as "painful." Pain aggravated with movement and deep breathing. He reports an intermittent cough that is non-productive. He denies sick contacts, fever, chills, sore throat. Upon questioning, patient stated "I have the virus so just treat me for it." Educated patient that testing is still in process. Inquired about IV drug use however he was unable to recall when he last used. Patient swatted examiner's hand after chest palpation, yelling "that hurts!" Patient became aggravated, reported his pain was now worse and requested pain medication. Per nurse, patient given PRN dose an hour prior. Patient encouraged to continue discussing his plan of care however stated, "I don't want to chat."   ED: Afebrile with leukocytosis. CTA chest from 3/26 with dense consolidative changes within the right lower lobe with internal cavitation containing air and fluid; Occlusion is seen of the right lower lobe bronchi which may be secondary to mucoid impaction or aspirated material; Additional multifocal opacities and cavitations.  Vancomycin and Zosyn administered. On 2L NC. VSS. Admitted to hospital medicine for further evaluation and COVID-19 rule out.        Overview/Hospital Course:  53 yo male with IVDU with Hep C, hx of MRSA (V sens), Pancreatitis, " Increase Zoloft to 75 mg daily for 2 weeks, then increase to 100 mg daily  Start Trazodone to 50 mg at bedtime as needed for sleep  Continue Wellbutrin  mg daily  Provided referral for psychotherapy at [facility], please also look at PsychologyToday. com for list of therapists accepting your insurance/in your area  Provided referral for neuropsychological testing, please also call numbers below to make appointment:  Call to set up an appointment for neuropsychological testing:  [facility] [phone]  [facility] [phone]  [facility]) Neuropsych[facility] [phone]) [phone]  [facility] [phone]) [phone]  Please explore [place] at [facility] for insomnia treatment which utilizes CBT-i  Please call the office nursing staff for medication issues including refills, problems obtaining medications, side effects, etc at [phone]. Please return for a follow up appointment as discussed. If you are running late or are unable to attend your appointment, please call our [place] office at [phone]. If you are in psychological crisis including not limited to suicidal/homicidal thoughts or thoughts of self-injury, do not hesitate to call/contact your [facility] hotline (see below), call 911, call [facility] (mental health crisis), or go to the nearest emergency department.   [facility] Crisis: [address] Crisis: [phone]  [address] Crisis: [address] Crisis: [address] [facility] Crisis: [address] Crisis: [address] Crisis: [phone]  National Suicide Prevention Hotline: 6-722.115.2391 DM2, who presents as a transfer from [facility] for SOB and CP, CT + multifocal apical cavitations and opacities, assoc. w/ air +fluid, suspected due to MRSA pneumonia c/b parapneumonic empyema and possible septic embolization. covid -, s/d 3/28   Chest tube placed 3/30 qhs on TPA x 6 doses (2/6 doses on 3/30 and 3/29). CTS no intervention for now. TTE negative.     04/01/2020 No acute events overnight. There was some concern with hemoptysis, however on discussion with RN and patient, there was no witnessed hemoptysis or active bleed or clots. There was a pink fluid visible in a container, and source is not clear. Patient received extra dose of 0.5 dilaudid x 2 on 3/31 and 4/1/20. He denies any difficulties breathing. He is resting comfortably in bed, watching tv, pain appears controlled.     04/02/2020 NAEON. No episodes of hemoptysis. 5/5 TPA doses given with good output. He denies chest pain. No SOB. No fevers.     Interval History: 04/02/2020 NAEON. No episodes of hemoptysis. 5/5 TPA doses given with good output. He denies chest pain. No SOB. No fevers.     Review of Systems   Constitutional: Positive for fatigue. Negative for fever.   Respiratory: Negative for cough (Denies) and shortness of breath.    Cardiovascular: Positive for chest pain (Pleuritic). Negative for leg swelling.   Gastrointestinal: Negative for abdominal pain, diarrhea, nausea and vomiting.   Genitourinary: Negative for dysuria.     Objective:     Vital Signs (Most Recent):  Temp: 98.2 °F (36.8 °C) (04/02/20 0800)  Pulse: 92 (04/02/20 0800)  Resp: 18 (04/02/20 0800)  BP: 110/63 (04/02/20 0800)  SpO2: 98 % (04/02/20 0800) Vital Signs (24h Range):  Temp:  [97.8 °F (36.6 °C)-98.8 °F (37.1 °C)] 98.2 °F (36.8 °C)  Pulse:  [] 92  Resp:  [14-18] 18  SpO2:  [95 %-99 %] 98 %  BP: (102-132)/(56-68) 110/63     Weight: 59.5 kg (131 lb 2.8 oz)  Body mass index is 19.94 kg/m².    Intake/Output Summary (Last 24 hours) at 4/2/2020 1030  Last data filed  at 4/2/2020 0900  Gross per 24 hour   Intake 720 ml   Output 1830 ml   Net -1110 ml      Physical Exam   Constitutional: He is oriented to person, place, and time. He appears well-developed. No distress.   Thin   HENT:   Head: Normocephalic and atraumatic.   Eyes: Pupils are equal, round, and reactive to light. EOM are normal.   Neck: Normal range of motion. No tracheal deviation present. No thyromegaly present.   Cardiovascular: Normal rate and normal heart sounds.   No murmur heard.  Tachycardia   Pulmonary/Chest: No respiratory distress. He has rales.   Abdominal: Bowel sounds are normal. He exhibits no distension.   Musculoskeletal: Normal range of motion. He exhibits no edema.   Neurological: He is alert and oriented to person, place, and time. No cranial nerve deficit.   Skin: He is not diaphoretic.   Nursing note and vitals reviewed.      Significant Labs:   BMP:   Recent Labs   Lab 04/02/20  0518 04/02/20  0852   GLU 86 77   * 129*   K 4.0 4.0   CL 95 95   CO2 26 25   BUN 12 12   CREATININE 0.8 0.8   CALCIUM 8.2* 8.3*   MG 2.0  --      CBC:   Recent Labs   Lab 04/01/20  0610 04/02/20  0518   WBC 18.22* 13.24*   HGB 8.1* 7.7*   HCT 26.6* 25.6*   * 627*       Significant Imaging: I have reviewed and interpreted all pertinent imaging results/findings within the past 24 hours.      Assessment/Plan:      * Acute respiratory failure with hypoxia  53 yo male with IVDU with Hep C, hx of MRSA (V sens), Pancreatitis, DM2, who presents as a transfer from [facility] for SOB and CP, CT + multifocal apical cavitations and opacities, assoc. w/ air +fluid, suspected due to MRSA pneumonia c/b parapneumonic empyema and possible septic embolization. covid -, s/d 3/28   Chest tube placed 3/30 qhs on TPA x 6 doses (2/6 doses on 3/30 and 3/29). CTS no intervention for now. TTE negative.     Plan:  - Recent Blood cultures remain negative, WBC count now coming down.   - Continue TB rule out, pending AFB samples  -  Follow up with ID. Continue broad spectrum therapy, on vanc and ceftriaxone, with plans to discharge with IV vanc + cefpedoxime for 4-6 weeks total. If Repeat imaging in 4 weeks. May require doxycyline following re imaging if worsening.     - CTS consulted, unlikely to perform intervention, has had good output and decrease on O2 requirements  - TPA per pulmonology, received 5/5 doses with good output in chest tube and improvement in respiratory status. Will pull chest tube today pulmonary team is able.             Septic embolism  TTE negative  Neuro checks q4h.       Parapneumonic effusion  See above       Hyponatremia  Improved with blood sugar control and fluid hydration  component of decreased effective art volume    Continue trend     Chest pain  Improved     -oxyCODONE immediate release [id number] mg as patient his pain is no controlled PRN, moderate pain  - should get better with draining of fluid    Tobacco abuse        Chronic hepatitis C        Anemia of chronic disease        Intravenous drug abuse    -counseled on cessation      Hyperlipidemia  - continue statin       Essential hypertension  Stable, not on meds       Type 2 diabetes mellitus, uncontrolled  -HbA1c 11  - Insulin adjusted 9 long BID, 3 short TID          Opioid dependence, continuous          VTE Risk Mitigation (From admission, onward)         Ordered     enoxaparin injection 40 mg  Daily      03/27/20 1029     IP VTE HIGH RISK PATIENT  Once      03/27/20 1511                      Matthew Blackwell MD  Department of Hospital Medicine   [facility]

## 2023-07-22 ENCOUNTER — HOSPITAL ENCOUNTER (EMERGENCY)
Facility: HOSPITAL | Age: 58
Discharge: HOME | End: 2023-07-23
Attending: EMERGENCY MEDICINE
Payer: MEDICARE

## 2023-07-22 DIAGNOSIS — E16.2 HYPOGLYCEMIA: Primary | ICD-10-CM
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
## 2023-07-22 DIAGNOSIS — E10.649 TYPE 1 DIABETES MELLITUS WITH HYPOGLYCEMIA AND WITHOUT COMA: ICD-10-CM

## 2023-07-22 DIAGNOSIS — R41.82 ALTERED MENTAL STATUS: ICD-10-CM

## 2023-07-22 DIAGNOSIS — F19.10 SUBSTANCE ABUSE: ICD-10-CM

## 2023-07-22 LAB
ALBUMIN SERPL BCP-MCNC: 3.4 G/DL (ref 3.5–5.2)
ALP SERPL-CCNC: 92 U/L (ref 55–135)
ALT SERPL W/O P-5'-P-CCNC: 18 U/L (ref 10–44)
ANION GAP SERPL CALC-SCNC: 9 MMOL/L (ref 8–16)
AST SERPL-CCNC: 17 U/L (ref 10–40)
BASOPHILS # BLD AUTO: 0.02 K/UL (ref 0–0.2)
BASOPHILS NFR BLD: 0.2 % (ref 0–1.9)
BILIRUB SERPL-MCNC: 0.3 MG/DL (ref 0.1–1)
BNP SERPL-MCNC: 41 PG/ML (ref 0–99)
BUN SERPL-MCNC: 18 MG/DL (ref 6–20)
CALCIUM SERPL-MCNC: 9 MG/DL (ref 8.7–10.5)
CHLORIDE SERPL-SCNC: 99 MMOL/L (ref 95–110)
CO2 SERPL-SCNC: 26 MMOL/L (ref 23–29)
CREAT SERPL-MCNC: 1 MG/DL (ref 0.5–1.4)
DIFFERENTIAL METHOD: ABNORMAL
EOSINOPHIL # BLD AUTO: 0.3 K/UL (ref 0–0.5)
EOSINOPHIL NFR BLD: 3.9 % (ref 0–8)
ERYTHROCYTE [DISTWIDTH] IN BLOOD BY AUTOMATED COUNT: 15.3 % (ref 11.5–14.5)
EST. GFR  (NO RACE VARIABLE): >60 ML/MIN/1.73 M^2
GLUCOSE SERPL-MCNC: 142 MG/DL (ref 70–110)
HCT VFR BLD AUTO: 35 % (ref 40–54)
HGB BLD-MCNC: 10.9 G/DL (ref 14–18)
IMM GRANULOCYTES # BLD AUTO: 0.04 K/UL (ref 0–0.04)
IMM GRANULOCYTES NFR BLD AUTO: 0.5 % (ref 0–0.5)
LACTATE SERPL-SCNC: 1.6 MMOL/L (ref 0.5–2.2)
LIPASE SERPL-CCNC: 114 U/L (ref 4–60)
LYMPHOCYTES # BLD AUTO: 1.2 K/UL (ref 1–4.8)
LYMPHOCYTES NFR BLD: 14.4 % (ref 18–48)
MAGNESIUM SERPL-MCNC: 2 MG/DL (ref 1.6–2.6)
MCH RBC QN AUTO: 25.1 PG (ref 27–31)
MCHC RBC AUTO-ENTMCNC: 31.1 G/DL (ref 32–36)
MCV RBC AUTO: 81 FL (ref 82–98)
MONOCYTES # BLD AUTO: 0.7 K/UL (ref 0.3–1)
MONOCYTES NFR BLD: 8.8 % (ref 4–15)
NEUTROPHILS # BLD AUTO: 5.9 K/UL (ref 1.8–7.7)
NEUTROPHILS NFR BLD: 72.2 % (ref 38–73)
NRBC BLD-RTO: 0 /100 WBC
PHOSPHATE SERPL-MCNC: 3.2 MG/DL (ref 2.7–4.5)
PLATELET # BLD AUTO: 344 K/UL (ref 150–450)
PMV BLD AUTO: 9.3 FL (ref 9.2–12.9)
POCT GLUCOSE: 154 MG/DL (ref 70–110)
POCT GLUCOSE: 203 MG/DL (ref 70–110)
POCT GLUCOSE: <20 MG/DL (ref 70–110)
POTASSIUM SERPL-SCNC: 3.6 MMOL/L (ref 3.5–5.1)
PROT SERPL-MCNC: 6.9 G/DL (ref 6–8.4)
RBC # BLD AUTO: 4.34 M/UL (ref 4.6–6.2)
SODIUM SERPL-SCNC: 134 MMOL/L (ref 136–145)
TROPONIN I SERPL DL<=0.01 NG/ML-MCNC: 0.01 NG/ML (ref 0–0.03)
TSH SERPL DL<=0.005 MIU/L-ACNC: 0.66 UIU/ML (ref 0.4–4)
WBC # BLD AUTO: 8.18 K/UL (ref 3.9–12.7)

## 2023-07-22 PROCEDURE — 25000242 PHARM REV CODE 250 ALT 637 W/ HCPCS: Performed by: EMERGENCY MEDICINE

## 2023-07-22 PROCEDURE — 80053 COMPREHEN METABOLIC PANEL: CPT | Performed by: EMERGENCY MEDICINE

## 2023-07-22 PROCEDURE — 84100 ASSAY OF PHOSPHORUS: CPT | Performed by: EMERGENCY MEDICINE

## 2023-07-22 PROCEDURE — 93010 ELECTROCARDIOGRAM REPORT: CPT | Mod: ,,, | Performed by: INTERNAL MEDICINE

## 2023-07-22 PROCEDURE — 93005 ELECTROCARDIOGRAM TRACING: CPT

## 2023-07-22 PROCEDURE — 94640 AIRWAY INHALATION TREATMENT: CPT | Mod: XB

## 2023-07-22 PROCEDURE — 25000003 PHARM REV CODE 250

## 2023-07-22 PROCEDURE — 83605 ASSAY OF LACTIC ACID: CPT | Performed by: EMERGENCY MEDICINE

## 2023-07-22 PROCEDURE — 85025 COMPLETE CBC W/AUTO DIFF WBC: CPT | Performed by: EMERGENCY MEDICINE

## 2023-07-22 PROCEDURE — 83735 ASSAY OF MAGNESIUM: CPT | Performed by: EMERGENCY MEDICINE

## 2023-07-22 PROCEDURE — 84443 ASSAY THYROID STIM HORMONE: CPT | Performed by: EMERGENCY MEDICINE

## 2023-07-22 PROCEDURE — 99285 EMERGENCY DEPT VISIT HI MDM: CPT | Mod: 25

## 2023-07-22 PROCEDURE — 25500020 PHARM REV CODE 255: Performed by: EMERGENCY MEDICINE

## 2023-07-22 PROCEDURE — 83690 ASSAY OF LIPASE: CPT | Performed by: EMERGENCY MEDICINE

## 2023-07-22 PROCEDURE — 82077 ASSAY SPEC XCP UR&BREATH IA: CPT | Performed by: EMERGENCY MEDICINE

## 2023-07-22 PROCEDURE — 93010 EKG 12-LEAD: ICD-10-PCS | Mod: ,,, | Performed by: INTERNAL MEDICINE

## 2023-07-22 PROCEDURE — 83880 ASSAY OF NATRIURETIC PEPTIDE: CPT | Performed by: EMERGENCY MEDICINE

## 2023-07-22 PROCEDURE — 96374 THER/PROPH/DIAG INJ IV PUSH: CPT

## 2023-07-22 PROCEDURE — 87040 BLOOD CULTURE FOR BACTERIA: CPT | Performed by: EMERGENCY MEDICINE

## 2023-07-22 PROCEDURE — 82962 GLUCOSE BLOOD TEST: CPT | Mod: 91

## 2023-07-22 PROCEDURE — 84484 ASSAY OF TROPONIN QUANT: CPT | Performed by: EMERGENCY MEDICINE

## 2023-07-22 RX ORDER — IPRATROPIUM BROMIDE AND ALBUTEROL SULFATE 2.5; .5 MG/3ML; MG/3ML
3 SOLUTION RESPIRATORY (INHALATION)
Status: COMPLETED | OUTPATIENT
Start: 2023-07-22 | End: 2023-07-22

## 2023-07-22 RX ORDER — DEXTROSE 50 % IN WATER (D50W) INTRAVENOUS SYRINGE
25
Status: COMPLETED | OUTPATIENT
Start: 2023-07-22 | End: 2023-07-22

## 2023-07-22 RX ADMIN — IPRATROPIUM BROMIDE AND ALBUTEROL SULFATE 3 ML: .5; 3 SOLUTION RESPIRATORY (INHALATION) at 10:07

## 2023-07-22 RX ADMIN — DEXTROSE MONOHYDRATE 25 G: 25 INJECTION, SOLUTION INTRAVENOUS at 10:07

## 2023-07-22 RX ADMIN — IOHEXOL 80 ML: 350 INJECTION, SOLUTION INTRAVENOUS at 11:07

## 2023-07-22 RX ADMIN — DEXTROSE 50 % IN WATER (D50W) INTRAVENOUS SYRINGE 25 G: at 10:07

## 2023-07-23 VITALS
DIASTOLIC BLOOD PRESSURE: 74 MMHG | OXYGEN SATURATION: 98 % | SYSTOLIC BLOOD PRESSURE: 150 MMHG | RESPIRATION RATE: 18 BRPM | HEART RATE: 100 BPM | HEIGHT: 68 IN | BODY MASS INDEX: 20.72 KG/M2 | TEMPERATURE: 98 F | WEIGHT: 136.69 LBS

## 2023-07-23 LAB
ETHANOL SERPL-MCNC: <10 MG/DL
POCT GLUCOSE: 297 MG/DL (ref 70–110)

## 2023-07-23 PROCEDURE — 82962 GLUCOSE BLOOD TEST: CPT

## 2023-07-23 NOTE — DISCHARGE INSTRUCTIONS
Eat normally and take your insulin as prescribed.    Do not use illicit drugs such as methamphetamine.
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
## 2023-07-23 NOTE — ED NOTES
Pt ambulated, with steady gait, to the bathroom and urinated on himself before making it to the toilet.
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
## 2023-07-27 LAB
BACTERIA BLD CULT: NORMAL
BACTERIA BLD CULT: NORMAL

## 2023-08-02 ENCOUNTER — HOSPITAL ENCOUNTER (EMERGENCY)
Facility: HOSPITAL | Age: 58
Discharge: LEFT BEFORE DISCHARGE | End: 2023-08-02
Attending: EMERGENCY MEDICINE
Payer: MEDICARE

## 2023-08-02 VITALS
WEIGHT: 140 LBS | HEART RATE: 87 BPM | SYSTOLIC BLOOD PRESSURE: 144 MMHG | HEIGHT: 68 IN | OXYGEN SATURATION: 99 % | BODY MASS INDEX: 21.22 KG/M2 | TEMPERATURE: 98 F | DIASTOLIC BLOOD PRESSURE: 80 MMHG | RESPIRATION RATE: 18 BRPM

## 2023-08-02 DIAGNOSIS — R51.9 ACUTE NONINTRACTABLE HEADACHE, UNSPECIFIED HEADACHE TYPE: Primary | ICD-10-CM

## 2023-08-02 DIAGNOSIS — S09.90XA INJURY OF HEAD, INITIAL ENCOUNTER: ICD-10-CM

## 2023-08-02 LAB — POCT GLUCOSE: 142 MG/DL (ref 70–110)

## 2023-08-02 PROCEDURE — 99284 EMERGENCY DEPT VISIT MOD MDM: CPT | Mod: 25

## 2023-08-02 PROCEDURE — 82962 GLUCOSE BLOOD TEST: CPT

## 2023-08-02 RX ORDER — MORPHINE SULFATE 4 MG/ML
6 INJECTION, SOLUTION INTRAMUSCULAR; INTRAVENOUS
Status: DISCONTINUED | OUTPATIENT
Start: 2023-08-02 | End: 2023-08-02 | Stop reason: HOSPADM

## 2023-08-02 RX ORDER — BUTALBITAL, ACETAMINOPHEN AND CAFFEINE 50; 325; 40 MG/1; MG/1; MG/1
1 TABLET ORAL
Status: DISCONTINUED | OUTPATIENT
Start: 2023-08-02 | End: 2023-08-02 | Stop reason: HOSPADM

## 2023-08-02 RX ORDER — KETOROLAC TROMETHAMINE 30 MG/ML
30 INJECTION, SOLUTION INTRAMUSCULAR; INTRAVENOUS
Status: DISCONTINUED | OUTPATIENT
Start: 2023-08-02 | End: 2023-08-02 | Stop reason: HOSPADM

## 2023-08-02 NOTE — ED NOTES
CT tech called to see about how long until pt will be taken for his CT, states they are only down to 1 tech and will come as soon as they can.

## 2023-08-02 NOTE — ED NOTES
"Pt yelling "I don't want to wait for no scan, I want to eat" Pt educated on need to wait until after scan before he is allowed to have food. Pt still yelling, stating that he will leave against medical advice if he is not given food. MD notified, pt refusing to sign AMA papers. Security at bedside to escort patient out. Pt has now changed his mind stating "I will wait 20 min, and if they don't come get me for the scan, then I want to leave". MD notified.  "

## 2023-08-02 NOTE — ED NOTES
Pt states he does not want to go to MRI, he only wants something to eat. Pt made aware that he is leaving against medical advice. Escorted out of ED along with his mother.

## 2023-08-02 NOTE — ED PROVIDER NOTES
"Encounter Date: 8/2/2023    SCRIBE #1 NOTE: I, Aleksey Phillip, am scribing for, and in the presence of,  Ines Bethea PA-C. I have scribed the following portions of the note - Other sections scribed: HPI, ROS.       History     Chief Complaint   Patient presents with    Vomiting     Patient was picked up by EMS at [facility], he was doing a f/u as he was recently discharged. Became a tad belligerent but is calm and cooperative here. CC: Vomiting and HA to Rt temporal area NOT responding to Tylenol this am. Patient is NON compliant w meds and was recently discharged w DKA here w sugars in the 800 range.        CC: COLVIN    HPI: 57 y.o. yo male with a PMHx of pancreatitis, CHF, DKA, DM, HTN, hypomagnesemia, presents to the ED for R sided HA onset 2 days. Associated symptoms are nausea and vomiting. Patient is also complaining of neck pain and unsteady gait s/p fall onset 3 days ago. He states that he is unsure if he LOC but reports waking up on the ground. He is also unsure of head trauma and states "I guess" when asked if he hit his head during the possible syncopal episode. Patient is noncompliant with daily medications. He is unsure if he is on blood thinners. Endorses taking a Tylenol at 0600 with no relief. No medications taken PTA. No alleviating or exacerbating factors noted. Denies fever, cough, congestion, rhinorrhea, dizziness or any associated symptoms.     Per EMS pt was picked up at [facility] where he was belligerent.       The history is provided by the patient. No  was used.     Review of patient's allergies indicates:  No Known Allergies  Past Medical History:   Diagnosis Date    Acute on chronic pancreatitis 04/03/2016    Addiction to drug     CHF (congestive heart failure)     DKA (diabetic ketoacidoses) 02/2020    DM (diabetes mellitus), type 1     HCV (hepatitis C virus)     high VL     HTN (hypertension)     Hx of psychiatric care     Hypomagnesemia " 05/25/2020    IVDU (intravenous drug user)     Heroin    Pancreatitis     Psychiatric problem     Substance abuse      Past Surgical History:   Procedure Laterality Date    CARDIAC SURGERY  1999    stent placed. ([facility])    ESOPHAGOGASTRODUODENOSCOPY N/A 3/5/2020    Gastritis.  No H pylori.  Completed PPI for 1 month.  Procedure: EGD (ESOPHAGOGASTRODUODENOSCOPY);  Surgeon: Brinda Rene MD;  Location: [facility] ENDO;  Service: Endoscopy;  Laterality: N/A;  W421 A; x5445    LEFT HEART CATHETERIZATION Left 9/28/2020    Procedure: Left heart cath;  Surgeon: Fito Rangel MD;  Location: [facility] CATH LAB;  Service: Cardiology;  Laterality: Left;    SHOULDER SURGERY  2013    right shoulder, spur removal.     Family History   Problem Relation Age of Onset    Asthma Mother      Social History     Tobacco Use    Smoking status: Every Day     Current packs/day: 0.00     Average packs/day: 1 pack/day for 39.2 years (39.2 ttl pk-yrs)     Types: Cigarettes     Start date: 4/3/1981     Last attempt to quit: 6/1/2020     Years since quitting: 3.1    Smokeless tobacco: Never    Tobacco comments:     states approx 1 per day.   Substance Use Topics    Alcohol use: Not Currently     Alcohol/week: 0.0 standard drinks of alcohol    Drug use: Yes     Types: Heroin, IV, Marijuana, Amphetamines     Comment: chronic methamphetamine user     Review of Systems   Constitutional:  Negative for chills and fever.   HENT:  Negative for congestion, ear pain, rhinorrhea and sore throat.    Eyes:  Negative for redness.   Respiratory:  Negative for cough, shortness of breath and stridor.    Cardiovascular:  Negative for chest pain.   Gastrointestinal:  Positive for nausea and vomiting. Negative for abdominal pain, constipation and diarrhea.   Genitourinary:  Negative for dysuria, frequency, hematuria and urgency.   Musculoskeletal:  Positive for gait problem (Unsteady.) and neck pain. Negative for back pain.   Skin:  Negative for rash.    Neurological:  Positive for syncope (Possible.) and headaches (R sided.). Negative for dizziness, speech difficulty, weakness, light-headedness and numbness.   Hematological:  Does not bruise/bleed easily.   Psychiatric/Behavioral:  Negative for confusion.        Physical Exam     Initial Vitals [08/02/23 1143]   BP Pulse Resp Temp SpO2   134/79 97 18 97.5 °F (36.4 °C) 98 %      MAP       --         Physical Exam    Nursing note and vitals reviewed.  Constitutional: He appears well-developed and well-nourished. He is not diaphoretic. No distress.   HENT:   Head: Normocephalic and atraumatic.   Right Ear: External ear normal.   Left Ear: External ear normal.   Nose: Nose normal.   TTP to parietal region. TTP over R cervical paraspinal musculature.   Eyes: Conjunctivae are normal. No scleral icterus.   Neck: Neck supple. No tracheal deviation present.   Cardiovascular:  Normal rate, regular rhythm and normal heart sounds.     Exam reveals no gallop and no friction rub.       No murmur heard.  Pulmonary/Chest: Breath sounds normal. No respiratory distress.   Abdominal: Abdomen is soft. Bowel sounds are normal. There is no abdominal tenderness.   Musculoskeletal:      Cervical back: Neck supple.     Neurological: He is alert and oriented to person, place, and time. He has normal strength. No cranial nerve deficit or sensory deficit. Coordination normal. GCS score is 15. GCS eye subscore is 4. GCS verbal subscore is 5. GCS motor subscore is 6.   Skin: Skin is warm and dry.   Psychiatric: He has a normal mood and affect. Thought content normal.         ED Course   Procedures  Labs Reviewed   POCT GLUCOSE - Abnormal; Notable for the following components:       Result Value    POCT Glucose 142 (*)     All other components within normal limits          Imaging Results              CT Head Without Contrast (Final result)  Result time 08/02/23 13:32:57      Final result by Gerson Hawthorne III, MD (08/02/23 13:32:57)                    Impression:      No acute intracranial process seen.    Left cerebellar infarct which is new compared to the 07/22/2023.    Ventriculomegaly.  Normal pressure hydrocephalus cannot be excluded.      Electronically signed by: Gerson Hawthorne MD  Date:    08/02/2023  Time:    13:32               Narrative:    EXAMINATION:  CT HEAD WITHOUT CONTRAST    CLINICAL HISTORY:  Head trauma, moderate-severe;    FINDINGS:  There is ventriculomegaly.  No blood products are seen.  The left lateral cerebellum demonstrates a wedge-shaped hypodensity new when compared to 07/22/2023.  No bleed, mass, or mass effect seen.  No skull lesion or skull fracture seen.  Paranasal sinuses, mastoid air cells, and middle ear cavities are clear.                                       CT Cervical Spine Without Contrast (Final result)  Result time 08/02/23 13:34:54      Final result by Gerson Hawthorne III, MD (08/02/23 13:34:54)                   Impression:      No significant abnormality seen.  No acute process seen.      Electronically signed by: Gerson Hawthorne MD  Date:    08/02/2023  Time:    13:34               Narrative:    EXAMINATION:  CT CERVICAL SPINE WITHOUT CONTRAST    CLINICAL HISTORY:  Neck trauma, impaired ROM (Age 16-64y);    FINDINGS:  Odontoid prevertebral soft tissues and posterior elements are intact.  There is minimal DJD.  Spinous processes, and facets are well aligned.  No fracture dislocation bone destruction seen.  There is minimal DJD.  The canal and neural foramina are patent.  There is emphysema of the upper lungs.  No incidental soft tissue masses are seen.  The canal and neural foramina are patent.                                       Medications - No data to display    Medical Decision Making:   History:   Old Medical Records: I decided to obtain old medical records.  Old Records Summarized: other records.       <> Summary of Records: External documents reviewed.     Clinical Tests:   Lab Tests: Ordered  and Reviewed  Radiological Study: Ordered and Reviewed  ED Management:  57-year-old male with history of hypertension diabetes Chf substance abuse presenting for evaluation of right-sided headache with associated nausea and vomiting.  Patient reports headache began after he awoke on the ground 3 days ago.  He is unsure why he fell.  He is unsure if any chest pain shortness of breath dizziness lightheadedness preceded the head injury.  Patient is very irritable and is refusing to provide a clear history.  He is requesting to eat.  Exam above.  Glucose 142.  Patient often seen at this facility for DKA.  Patient is currently refusing any blood work or testing for his possible syncopal episode.  He was initially agreeable to CT head and C-spine    1145- pt initially requesting to leave AMA because he is angry and wants to eat   He refused to sign AMA paperwork but states that he wanted to leave AMA    He then changed his mind and said he would wait 20 mins for CT imaging.  CT shows left cerebellar infarct which is new compared to 2 weeks ago.  I discussed the patient with Dr. Boone Neurology on-call who recommends MRI brain with and without contrast.  Dr. Gomez also evaluated pt face to face at this time.   Pt negotiating with Dr. Gomez to eat a sandwich prior to agreeing to MRI   He received a sandwich and is now refusing to go to MRI. Pt left AMA prior to completion of testing  Unable to exclude emergent or life threatening etiology of his symptoms. Pt is not incompetent. Capable of making decisions and do not feel taht he requires PEC at this time. He does not want us to discuss his results or his ER visit with his family members.   Left AMA in stable condition with stable gait using his cane.     Discussed pt with Dr. Gomez who also evaluated pt face to face and he agrees with assessment and plan.           Scribe Attestation:   Scribe #1: I performed the above scribed service and the documentation accurately  describes the services I performed. I attest to the accuracy of the note.                     Clinical Impression:   Final diagnoses:  [R51.9] Acute nonintractable headache, unspecified headache type (Primary)  [S09.90XA] Injury of head, initial encounter                 I, Ines Bethea PA-C , personally performed the services described in this documentation. All medical record entries made by the scribe were at my direction and in my presence. I have reviewed the chart and agree that the record reflects my personal performance and is accurate and complete.             ED Disposition Condition    AMA Stable                Ines Bethea PA-C  08/03/23 0653

## 2023-08-02 NOTE — ED NOTES
Pt refusing to wear EKG monitoring, MD notified. Only allowing nurse to place pulse ox and BP cuff on him.

## 2023-08-02 NOTE — ED TRIAGE NOTES
"Pt presents to ED via EMS co of generalized headache described as "pain everywhere" x 1 week. Pt states he took tylenol, doesn't remember when. HX of Diabetes, has not been compliant with meds, recently hospitalized for DKA. Pt also co of blurry vison x 1 month, can track fingers, but unable to guess correct amount of fingers hled up. Denies any N/V/D/C, fevers. Pt states he is "hungry and wants something to eat, and does not want to be here".  "

## 2023-08-02 NOTE — ED NOTES
Pt mother at bedside, pt became combative, started yelling. Pt mother asked to wait in waiting room to keep pt calm.

## 2023-08-02 NOTE — Clinical Note
"Date: 8/2/2023  Patient: Cali Mabry  Admitted: 8/2/2023 11:45 AM  Attending Provider: Leon Gomez MD    Cali Mabry or his authorized caregiver has made the decision for the patient to leave the emergency department against the adv ice of the emergency department staff. He or his authorized caregiver has been informed and understands the inherent risks, including death, worsening symptoms.  He or his authorized caregiver has decided to accept the responsibility for this decisio nKevin Mabry and all necessary parties have been advised that he may return for further evaluation or treatment. His condition at time of discharge was stable.  Cali Mabry had current vital signs as follows:  BP (!) 141/76   Pulse 9 1   Temp 97.5 °F (36.4 °C)   Resp 18   Ht 5' 8" (1.727 m)   Wt 63.5 kg (140 lb)   "

## 2023-08-04 ENCOUNTER — PATIENT OUTREACH (OUTPATIENT)
Dept: ADMINISTRATIVE | Facility: OTHER | Age: 58
End: 2023-08-04

## 2023-08-04 NOTE — PROGRESS NOTES
CHW - Outreach Attempt    Community Health Worker to attempt to contact patient on: 8/4/23 1st missed initial outreach attempt call.

## 2023-08-08 ENCOUNTER — HOSPITAL ENCOUNTER (EMERGENCY)
Facility: HOSPITAL | Age: 58
Discharge: LEFT BEFORE DISCHARGE | End: 2023-08-08
Attending: EMERGENCY MEDICINE
Payer: MEDICARE

## 2023-08-08 VITALS
HEART RATE: 89 BPM | HEIGHT: 68 IN | RESPIRATION RATE: 28 BRPM | SYSTOLIC BLOOD PRESSURE: 177 MMHG | DIASTOLIC BLOOD PRESSURE: 81 MMHG | WEIGHT: 140 LBS | BODY MASS INDEX: 21.22 KG/M2 | OXYGEN SATURATION: 98 % | TEMPERATURE: 99 F

## 2023-08-08 DIAGNOSIS — H53.8 BLURRED VISION, BILATERAL: Primary | ICD-10-CM

## 2023-08-08 DIAGNOSIS — I63.9 STROKE: ICD-10-CM

## 2023-08-08 DIAGNOSIS — R73.9 HYPERGLYCEMIA: ICD-10-CM

## 2023-08-08 LAB
ALBUMIN SERPL BCP-MCNC: 3.2 G/DL (ref 3.5–5.2)
ALLENS TEST: ABNORMAL
ALP SERPL-CCNC: 125 U/L (ref 55–135)
ALT SERPL W/O P-5'-P-CCNC: 25 U/L (ref 10–44)
ANION GAP SERPL CALC-SCNC: 14 MMOL/L (ref 8–16)
AST SERPL-CCNC: 18 U/L (ref 10–40)
BASOPHILS # BLD AUTO: 0.03 K/UL (ref 0–0.2)
BASOPHILS NFR BLD: 0.4 % (ref 0–1.9)
BILIRUB SERPL-MCNC: 0.3 MG/DL (ref 0.1–1)
BUN SERPL-MCNC: 22 MG/DL (ref 6–20)
CALCIUM SERPL-MCNC: 9.5 MG/DL (ref 8.7–10.5)
CHLORIDE SERPL-SCNC: 97 MMOL/L (ref 95–110)
CHOLEST SERPL-MCNC: 218 MG/DL (ref 120–199)
CHOLEST/HDLC SERPL: 4.5 {RATIO} (ref 2–5)
CO2 SERPL-SCNC: 21 MMOL/L (ref 23–29)
CREAT SERPL-MCNC: 1.3 MG/DL (ref 0.5–1.4)
DELSYS: ABNORMAL
DIFFERENTIAL METHOD: ABNORMAL
EOSINOPHIL # BLD AUTO: 0.1 K/UL (ref 0–0.5)
EOSINOPHIL NFR BLD: 1.2 % (ref 0–8)
ERYTHROCYTE [DISTWIDTH] IN BLOOD BY AUTOMATED COUNT: 16.3 % (ref 11.5–14.5)
EST. GFR  (NO RACE VARIABLE): >60 ML/MIN/1.73 M^2
GLUCOSE SERPL-MCNC: 438 MG/DL (ref 70–110)
HCO3 UR-SCNC: 27.7 MMOL/L (ref 24–28)
HCT VFR BLD AUTO: 35.9 % (ref 40–54)
HDLC SERPL-MCNC: 48 MG/DL (ref 40–75)
HDLC SERPL: 22 % (ref 20–50)
HGB BLD-MCNC: 11.3 G/DL (ref 14–18)
IMM GRANULOCYTES # BLD AUTO: 0.04 K/UL (ref 0–0.04)
IMM GRANULOCYTES NFR BLD AUTO: 0.5 % (ref 0–0.5)
LDLC SERPL CALC-MCNC: 110.2 MG/DL (ref 63–159)
LYMPHOCYTES # BLD AUTO: 1.4 K/UL (ref 1–4.8)
LYMPHOCYTES NFR BLD: 17.6 % (ref 18–48)
MCH RBC QN AUTO: 25.5 PG (ref 27–31)
MCHC RBC AUTO-ENTMCNC: 31.5 G/DL (ref 32–36)
MCV RBC AUTO: 81 FL (ref 82–98)
MONOCYTES # BLD AUTO: 1 K/UL (ref 0.3–1)
MONOCYTES NFR BLD: 12.3 % (ref 4–15)
NEUTROPHILS # BLD AUTO: 5.5 K/UL (ref 1.8–7.7)
NEUTROPHILS NFR BLD: 68 % (ref 38–73)
NONHDLC SERPL-MCNC: 170 MG/DL
NRBC BLD-RTO: 0 /100 WBC
PCO2 BLDA: 46.2 MMHG (ref 35–45)
PH SMN: 7.39 [PH] (ref 7.35–7.45)
PLATELET # BLD AUTO: 450 K/UL (ref 150–450)
PMV BLD AUTO: 9.2 FL (ref 9.2–12.9)
PO2 BLDA: 31 MMHG (ref 40–60)
POC BE: 2 MMOL/L
POC SATURATED O2: 59 % (ref 95–100)
POC TCO2: 29 MMOL/L (ref 24–29)
POTASSIUM SERPL-SCNC: 4.9 MMOL/L (ref 3.5–5.1)
PROT SERPL-MCNC: 7.7 G/DL (ref 6–8.4)
RBC # BLD AUTO: 4.44 M/UL (ref 4.6–6.2)
SAMPLE: ABNORMAL
SITE: ABNORMAL
SODIUM SERPL-SCNC: 132 MMOL/L (ref 136–145)
T4 FREE SERPL-MCNC: 0.97 NG/DL (ref 0.71–1.51)
TRIGL SERPL-MCNC: 299 MG/DL (ref 30–150)
TSH SERPL DL<=0.005 MIU/L-ACNC: 0.1 UIU/ML (ref 0.4–4)
WBC # BLD AUTO: 8.03 K/UL (ref 3.9–12.7)

## 2023-08-08 PROCEDURE — 93005 ELECTROCARDIOGRAM TRACING: CPT

## 2023-08-08 PROCEDURE — 80053 COMPREHEN METABOLIC PANEL: CPT | Performed by: EMERGENCY MEDICINE

## 2023-08-08 PROCEDURE — 93010 EKG 12-LEAD: ICD-10-PCS | Mod: ,,, | Performed by: INTERNAL MEDICINE

## 2023-08-08 PROCEDURE — 25000003 PHARM REV CODE 250

## 2023-08-08 PROCEDURE — 82803 BLOOD GASES ANY COMBINATION: CPT

## 2023-08-08 PROCEDURE — 80061 LIPID PANEL: CPT | Performed by: EMERGENCY MEDICINE

## 2023-08-08 PROCEDURE — 84439 ASSAY OF FREE THYROXINE: CPT | Performed by: EMERGENCY MEDICINE

## 2023-08-08 PROCEDURE — 99284 EMERGENCY DEPT VISIT MOD MDM: CPT | Mod: 25

## 2023-08-08 PROCEDURE — 93010 ELECTROCARDIOGRAM REPORT: CPT | Mod: ,,, | Performed by: INTERNAL MEDICINE

## 2023-08-08 PROCEDURE — 80048 BASIC METABOLIC PNL TOTAL CA: CPT | Mod: XB

## 2023-08-08 PROCEDURE — 99900035 HC TECH TIME PER 15 MIN (STAT)

## 2023-08-08 PROCEDURE — 84443 ASSAY THYROID STIM HORMONE: CPT | Performed by: EMERGENCY MEDICINE

## 2023-08-08 PROCEDURE — 96360 HYDRATION IV INFUSION INIT: CPT

## 2023-08-08 PROCEDURE — 85025 COMPLETE CBC W/AUTO DIFF WBC: CPT | Performed by: EMERGENCY MEDICINE

## 2023-08-08 RX ADMIN — SODIUM CHLORIDE 1000 ML: 9 INJECTION, SOLUTION INTRAVENOUS at 08:08

## 2023-08-08 NOTE — LETTER
Patient: Domenic Mabry  YOB: 1965  Date: 8/8/2023 Time: 9:22 PM  Location: [facility]    Leaving the Hospital Against Medical Advice    Chart #:[id number]    This will certify that I, the undersigned,    ______________________________________________________________________    A patient in the above named medical center, having requested discharge and removal from the medical center against the advice of my attending physician(s), hereby release [facility], its physicians, officers and employees, severally and individually, from any and all liability of any nature whatsoever for any injury or harm or complication of any kind that may result directly or indirectly, by reason of my terminating my stay as a patient at [facility] and my departure from [facility], and hereby waive any and all rights of action I may now have or later acquire as a result of my voluntary departure from [facility] and the termination of my stay as a patient therein.    This release is made with the full knowledge of the danger that may result from the action which I am taking.      Date:_______________________                         ___________________________                                                                                    Patient/Legal Representative    Witness:        ____________________________                          ___________________________  Nurse                                                                        Physician

## 2023-08-09 NOTE — ED NOTES
Pt refuses to sign AMA papers, states that he wants to stay, be treated, so he won't die. Pt yells that he hasn't eaten in 2 days. 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

## 2023-08-09 NOTE — ED PROVIDER NOTES
"Encounter Date: 8/8/2023       History     Chief Complaint   Patient presents with    Eye Problem     Pt here via EMS with reports of vision loss x 24 hours. Pt stated it is related to his diabetes and it has happened before. Pts .     56 yo male With multiple medical problems including substance abuse and psychiatric disorders as well as diabetes, HTN, and reported CHF on chart with no prior Echo presents via EMS with reported vision loss for 24 hours. Reported to EMS that it has happened before when his BS elevates. EMS CBG was 434.  History limited by patient compliance. Chart review shows patient was seen 6 days ago and had a left Cerebellar infarct on his CT scan that was new from prior.  PT then left AMA. Today when I entered the room pt yelled that he needed a sandwich.  I attempted to introduce myself, explain that I needed to evaluate him before seeing if he can eat at this time. HE then called me stupid.  When I asked what brings him to the emergency room he stated "If I did not know then I am stupid."  Despite multiple attempts to redirect the conversation to something constructive and deescalate the situation the patient continued to be uncooperative. It was apparent that he knew where he was and what he wanted which was a "hot sandwich".  Pt is refusing to give further HPI or evaluation. Pt is refusing to allow nursing to recollect samples. He is refusing Radiology to perform imaging. He has states multiple times that he wants to leave and not seek medical care during this ER visit.      Review of patient's allergies indicates:  No Known Allergies  Past Medical History:   Diagnosis Date    Acute on chronic pancreatitis 04/03/2016    Addiction to drug     CHF (congestive heart failure)     DKA (diabetic ketoacidoses) 02/2020    DM (diabetes mellitus), type 1     HCV (hepatitis C virus)     high VL     HTN (hypertension)     Hx of psychiatric care     Hypomagnesemia 05/25/2020    IVDU (intravenous " drug user)     Heroin    Pancreatitis     Psychiatric problem     Substance abuse      Past Surgical History:   Procedure Laterality Date    CARDIAC SURGERY  1999    stent placed. ([facility])    ESOPHAGOGASTRODUODENOSCOPY N/A 3/5/2020    Gastritis.  No H pylori.  Completed PPI for 1 month.  Procedure: EGD (ESOPHAGOGASTRODUODENOSCOPY);  Surgeon: Brinda Rene MD;  Location: [facility] ENDO;  Service: Endoscopy;  Laterality: N/A;  W421 A; x5445    LEFT HEART CATHETERIZATION Left 9/28/2020    Procedure: Left heart cath;  Surgeon: Fito Rangel MD;  Location: [facility] CATH LAB;  Service: Cardiology;  Laterality: Left;    SHOULDER SURGERY  2013    right shoulder, spur removal.     Family History   Problem Relation Age of Onset    Asthma Mother      Social History     Tobacco Use    Smoking status: Every Day     Current packs/day: 0.00     Average packs/day: 1 pack/day for 39.2 years (39.2 ttl pk-yrs)     Types: Cigarettes     Start date: 4/3/1981     Last attempt to quit: 6/1/2020     Years since quitting: 3.1    Smokeless tobacco: Never    Tobacco comments:     states approx 1 per day.   Substance Use Topics    Alcohol use: Not Currently     Alcohol/week: 0.0 standard drinks of alcohol    Drug use: Yes     Types: Heroin, IV, Marijuana, Amphetamines     Comment: chronic methamphetamine user     Review of Systems   Unable to perform ROS: Other (patient noncompliance)       Physical Exam     Initial Vitals [08/08/23 2007]   BP Pulse Resp Temp SpO2   (!) 160/80 90 18 98.9 °F (37.2 °C) 98 %      MAP       --         Physical Exam    Constitutional: No distress.     Psychiatric:   Pt appears to interactive with surroundings, bedding normally. He is focusing on myself, the nurse. He does not appear to be gravely disabled or acutely psychotic. He denies suicidal ideation.          ED Course   Procedures  Labs Reviewed   CBC W/ AUTO DIFFERENTIAL - Abnormal; Notable for the following components:       Result Value    RBC  4.44 (*)     Hemoglobin 11.3 (*)     Hematocrit 35.9 (*)     MCV 81 (*)     MCH 25.5 (*)     MCHC 31.5 (*)     RDW 16.3 (*)     Lymph % 17.6 (*)     All other components within normal limits   COMPREHENSIVE METABOLIC PANEL - Abnormal; Notable for the following components:    Sodium 132 (*)     CO2 21 (*)     Glucose 438 (*)     BUN 22 (*)     Albumin 3.2 (*)     All other components within normal limits   LIPID PANEL - Abnormal; Notable for the following components:    Cholesterol 218 (*)     Triglycerides 299 (*)     All other components within normal limits   ISTAT PROCEDURE - Abnormal; Notable for the following components:    POC PCO2 46.2 (*)     POC PO2 31 (*)     POC SATURATED O2 59 (*)     All other components within normal limits   TSH   BETA - HYDROXYBUTYRATE, SERUM   PROTIME-INR   BETA - HYDROXYBUTYRATE, SERUM   ISTAT CHEM8          Imaging Results    None     Pt refusing to sign AMA form. Chart review shows patient AMA'd 5 days ago as well. He was considered competent to AMA at that time. I see nothing that makes the patient appear incompetent at this time other than lacking personal communication skills and coping mechanisms when he does not immediately receive the sandwich he is asking for. PT will leave AMA. Next best option of care performed. I have placed referrals to Neurology and Ophthalmology and Internal medicine. I have also given resources for drug and alcohol. PT does not smell of alcohol or appear acutely intoxicated.      Medications   sodium chloride 0.9% bolus 1,000 mL 1,000 mL (0 mLs Intravenous Stopped 8/8/23 2144)                              Clinical Impression:   Final diagnoses:  [I63.9] Stroke  [H53.8] Blurred vision, bilateral (Primary)  [R73.9] Hyperglycemia        ED Disposition Condition    AMA Stable                Mat Sanchez MD  08/08/23 2208       Mat Sanchez MD  08/08/23 2209

## 2023-08-09 NOTE — ED NOTES
Pt escorted at via security. Pt was changed out of wet garments prior to exiting. Security was called d/t patient smashing and sling cup in room after charge nurse calmly reiterated to him that he could not eat or drink until testing completed.

## 2023-08-09 NOTE — RESPIRATORY THERAPY
Latest Reference Range & Units 08/08/23 20:52   POC PH 7.35 - 7.45  7.386   POC PCO2 35 - 45 mmHg 46.2 (H)   POC PO2 40 - 60 mmHg 31 (L)   POC HCO3 24 - 28 mmol/L 27.7   POC SATURATED O2 95 - 100 % 59 (L)   Sample  VENOUS   POC TCO2 24 - 29 mmol/L 29   POC BE -2 to 2 mmol/L 2   DelSys  Room Air   Site  Other   (H): Data is abnormally high  (L): Data is abnormally low

## 2023-08-09 NOTE — ED NOTES
"Md at bedside. Pt yelling that he want to sign out AMA and asked what is he doing here. Tells the doctor "You are stupid!".    "

## 2023-08-15 ENCOUNTER — HOSPITAL ENCOUNTER (INPATIENT)
Facility: HOSPITAL | Age: 58
LOS: 7 days | Discharge: PSYCHIATRIC OR SUBSTANCE USE FACILITY | DRG: 065 | End: 2023-08-22
Attending: EMERGENCY MEDICINE | Admitting: STUDENT IN AN ORGANIZED HEALTH CARE EDUCATION/TRAINING PROGRAM
Payer: MEDICARE

## 2023-08-15 DIAGNOSIS — R94.31 QT PROLONGATION: ICD-10-CM

## 2023-08-15 DIAGNOSIS — I63.9 STROKE: Primary | ICD-10-CM

## 2023-08-15 DIAGNOSIS — I63.431 CEREBROVASCULAR ACCIDENT (CVA) DUE TO EMBOLISM OF RIGHT POSTERIOR CEREBRAL ARTERY: ICD-10-CM

## 2023-08-15 DIAGNOSIS — F99 PSYCHIATRIC DISTURBANCE: ICD-10-CM

## 2023-08-15 DIAGNOSIS — H53.9 VISION CHANGES: ICD-10-CM

## 2023-08-15 DIAGNOSIS — R07.9 CHEST PAIN: ICD-10-CM

## 2023-08-15 DIAGNOSIS — F63.9 IMPULSE CONTROL DISORDER IN ADULT: ICD-10-CM

## 2023-08-15 LAB
ALBUMIN SERPL BCP-MCNC: 3.5 G/DL (ref 3.5–5.2)
ALP SERPL-CCNC: 152 U/L (ref 55–135)
ALT SERPL W/O P-5'-P-CCNC: 24 U/L (ref 10–44)
AMPHET+METHAMPHET UR QL: ABNORMAL
ANION GAP SERPL CALC-SCNC: 13 MMOL/L (ref 8–16)
APAP SERPL-MCNC: <3 UG/ML (ref 10–20)
AST SERPL-CCNC: 24 U/L (ref 10–40)
BACTERIA #/AREA URNS HPF: NORMAL /HPF
BARBITURATES UR QL SCN>200 NG/ML: NEGATIVE
BASOPHILS # BLD AUTO: 0.01 K/UL (ref 0–0.2)
BASOPHILS NFR BLD: 0.2 % (ref 0–1.9)
BENZODIAZ UR QL SCN>200 NG/ML: ABNORMAL
BILIRUB SERPL-MCNC: 0.2 MG/DL (ref 0.1–1)
BILIRUB UR QL STRIP: NEGATIVE
BUN SERPL-MCNC: 20 MG/DL (ref 6–20)
BZE UR QL SCN: NEGATIVE
CALCIUM SERPL-MCNC: 10 MG/DL (ref 8.7–10.5)
CANNABINOIDS UR QL SCN: NEGATIVE
CHLORIDE SERPL-SCNC: 100 MMOL/L (ref 95–110)
CHOLEST SERPL-MCNC: 297 MG/DL (ref 120–199)
CHOLEST/HDLC SERPL: 6.6 {RATIO} (ref 2–5)
CLARITY UR: CLEAR
CO2 SERPL-SCNC: 26 MMOL/L (ref 23–29)
COLOR UR: YELLOW
CREAT SERPL-MCNC: 1.1 MG/DL (ref 0.5–1.4)
CREAT UR-MCNC: 96.1 MG/DL (ref 23–375)
DIFFERENTIAL METHOD: ABNORMAL
EOSINOPHIL # BLD AUTO: 0.1 K/UL (ref 0–0.5)
EOSINOPHIL NFR BLD: 2.3 % (ref 0–8)
ERYTHROCYTE [DISTWIDTH] IN BLOOD BY AUTOMATED COUNT: 16.7 % (ref 11.5–14.5)
EST. GFR  (NO RACE VARIABLE): >60 ML/MIN/1.73 M^2
ETHANOL SERPL-MCNC: <10 MG/DL
GLUCOSE SERPL-MCNC: 140 MG/DL (ref 70–110)
GLUCOSE UR QL STRIP: ABNORMAL
HCT VFR BLD AUTO: 40.8 % (ref 40–54)
HDLC SERPL-MCNC: 45 MG/DL (ref 40–75)
HDLC SERPL: 15.2 % (ref 20–50)
HGB BLD-MCNC: 12.8 G/DL (ref 14–18)
HGB UR QL STRIP: NEGATIVE
HYALINE CASTS #/AREA URNS LPF: 0 /LPF
IMM GRANULOCYTES # BLD AUTO: 0.04 K/UL (ref 0–0.04)
IMM GRANULOCYTES NFR BLD AUTO: 0.6 % (ref 0–0.5)
INR PPP: 0.9 (ref 0.8–1.2)
KETONES UR QL STRIP: NEGATIVE
LDLC SERPL CALC-MCNC: ABNORMAL MG/DL (ref 63–159)
LEUKOCYTE ESTERASE UR QL STRIP: NEGATIVE
LYMPHOCYTES # BLD AUTO: 1.8 K/UL (ref 1–4.8)
LYMPHOCYTES NFR BLD: 28.8 % (ref 18–48)
MCH RBC QN AUTO: 25.9 PG (ref 27–31)
MCHC RBC AUTO-ENTMCNC: 31.4 G/DL (ref 32–36)
MCV RBC AUTO: 82 FL (ref 82–98)
METHADONE UR QL SCN>300 NG/ML: NEGATIVE
MICROSCOPIC COMMENT: NORMAL
MONOCYTES # BLD AUTO: 0.7 K/UL (ref 0.3–1)
MONOCYTES NFR BLD: 11.9 % (ref 4–15)
NEUTROPHILS # BLD AUTO: 3.5 K/UL (ref 1.8–7.7)
NEUTROPHILS NFR BLD: 56.2 % (ref 38–73)
NITRITE UR QL STRIP: NEGATIVE
NONHDLC SERPL-MCNC: 252 MG/DL
NRBC BLD-RTO: 0 /100 WBC
OPIATES UR QL SCN: NEGATIVE
PCP UR QL SCN>25 NG/ML: NEGATIVE
PH UR STRIP: 6 [PH] (ref 5–8)
PLATELET # BLD AUTO: 443 K/UL (ref 150–450)
PMV BLD AUTO: 8.8 FL (ref 9.2–12.9)
POTASSIUM SERPL-SCNC: 4.3 MMOL/L (ref 3.5–5.1)
PROT SERPL-MCNC: 9 G/DL (ref 6–8.4)
PROT UR QL STRIP: ABNORMAL
PROTHROMBIN TIME: 9.6 SEC (ref 9–12.5)
RBC # BLD AUTO: 4.95 M/UL (ref 4.6–6.2)
RBC #/AREA URNS HPF: 0 /HPF (ref 0–4)
SODIUM SERPL-SCNC: 139 MMOL/L (ref 136–145)
SP GR UR STRIP: >1.03 (ref 1–1.03)
T4 FREE SERPL-MCNC: 0.86 NG/DL (ref 0.71–1.51)
TOXICOLOGY INFORMATION: ABNORMAL
TRIGL SERPL-MCNC: 691 MG/DL (ref 30–150)
TSH SERPL DL<=0.005 MIU/L-ACNC: 0.18 UIU/ML (ref 0.4–4)
URN SPEC COLLECT METH UR: ABNORMAL
UROBILINOGEN UR STRIP-ACNC: NEGATIVE EU/DL
WBC # BLD AUTO: 6.21 K/UL (ref 3.9–12.7)
WBC #/AREA URNS HPF: 0 /HPF (ref 0–5)
YEAST URNS QL MICRO: NORMAL

## 2023-08-15 PROCEDURE — 82077 ASSAY SPEC XCP UR&BREATH IA: CPT | Performed by: EMERGENCY MEDICINE

## 2023-08-15 PROCEDURE — 84439 ASSAY OF FREE THYROXINE: CPT | Performed by: EMERGENCY MEDICINE

## 2023-08-15 PROCEDURE — 25000003 PHARM REV CODE 250: Performed by: EMERGENCY MEDICINE

## 2023-08-15 PROCEDURE — 85025 COMPLETE CBC W/AUTO DIFF WBC: CPT | Performed by: EMERGENCY MEDICINE

## 2023-08-15 PROCEDURE — 82962 GLUCOSE BLOOD TEST: CPT

## 2023-08-15 PROCEDURE — 80053 COMPREHEN METABOLIC PANEL: CPT | Performed by: EMERGENCY MEDICINE

## 2023-08-15 PROCEDURE — 99285 EMERGENCY DEPT VISIT HI MDM: CPT | Mod: 25

## 2023-08-15 PROCEDURE — 85610 PROTHROMBIN TIME: CPT | Performed by: EMERGENCY MEDICINE

## 2023-08-15 PROCEDURE — 80307 DRUG TEST PRSMV CHEM ANLYZR: CPT | Performed by: EMERGENCY MEDICINE

## 2023-08-15 PROCEDURE — 81000 URINALYSIS NONAUTO W/SCOPE: CPT | Mod: 59 | Performed by: EMERGENCY MEDICINE

## 2023-08-15 PROCEDURE — G0425 PR INPT TELEHEALTH CONSULT 30M: ICD-10-PCS | Mod: GT,,, | Performed by: PSYCHIATRY & NEUROLOGY

## 2023-08-15 PROCEDURE — 12000002 HC ACUTE/MED SURGE SEMI-PRIVATE ROOM

## 2023-08-15 PROCEDURE — 25500020 PHARM REV CODE 255: Performed by: EMERGENCY MEDICINE

## 2023-08-15 PROCEDURE — 84443 ASSAY THYROID STIM HORMONE: CPT | Performed by: EMERGENCY MEDICINE

## 2023-08-15 PROCEDURE — 93010 ELECTROCARDIOGRAM REPORT: CPT | Mod: ,,, | Performed by: INTERNAL MEDICINE

## 2023-08-15 PROCEDURE — 93005 ELECTROCARDIOGRAM TRACING: CPT

## 2023-08-15 PROCEDURE — G0425 INPT/ED TELECONSULT30: HCPCS | Mod: GT,,, | Performed by: PSYCHIATRY & NEUROLOGY

## 2023-08-15 PROCEDURE — 80061 LIPID PANEL: CPT | Performed by: EMERGENCY MEDICINE

## 2023-08-15 PROCEDURE — 80143 DRUG ASSAY ACETAMINOPHEN: CPT | Performed by: EMERGENCY MEDICINE

## 2023-08-15 PROCEDURE — 93010 EKG 12-LEAD: ICD-10-PCS | Mod: ,,, | Performed by: INTERNAL MEDICINE

## 2023-08-15 RX ORDER — ASPIRIN 325 MG
325 TABLET ORAL
Status: COMPLETED | OUTPATIENT
Start: 2023-08-15 | End: 2023-08-15

## 2023-08-15 RX ORDER — ZIPRASIDONE MESYLATE 20 MG/ML
20 INJECTION, POWDER, LYOPHILIZED, FOR SOLUTION INTRAMUSCULAR
Status: DISCONTINUED | OUTPATIENT
Start: 2023-08-15 | End: 2023-08-15

## 2023-08-15 RX ORDER — ZIPRASIDONE MESYLATE 20 MG/ML
20 INJECTION, POWDER, LYOPHILIZED, FOR SOLUTION INTRAMUSCULAR
Status: DISCONTINUED | OUTPATIENT
Start: 2023-08-15 | End: 2023-08-16

## 2023-08-15 RX ADMIN — ASPIRIN 325 MG ORAL TABLET 325 MG: 325 PILL ORAL at 11:08

## 2023-08-15 RX ADMIN — IOHEXOL 80 ML: 350 INJECTION, SOLUTION INTRAVENOUS at 08:08

## 2023-08-15 NOTE — ED PROVIDER NOTES
"Encounter Date: 8/15/2023       History     Chief Complaint   Patient presents with    Headache     EMS called to 58yo male that fell a week ago and has had a headache and loss of vision since the fall. He stated he fell backwards and hit his head.     57 y.o. male Past Medical History:  04/03/2016: Acute on chronic pancreatitis  No date: Addiction to drug  No date: CHF (congestive heart failure)  02/2020: DKA (diabetic ketoacidoses)  No date: DM (diabetes mellitus), type 1  No date: HCV (hepatitis C virus)      Comment:  high VL   No date: HTN (hypertension)  No date: Hx of psychiatric care  05/25/2020: Hypomagnesemia  No date: IVDU (intravenous drug user)      Comment:  Heroin  No date: Pancreatitis  No date: Psychiatric problem  No date: Substance abuse     Polysubstance abuse including IVDU, psych, pancreatitis, chf, DM with hx of DKA, presents c/o "inability to see" since fall backwards onto head 1-2 wks ago. Pt denies subsequent injury.    Review of records demonstrates CT head 8/2/23 with "new cerebellar infarct from CT 7/22/23" Since that time.  Pt was seen in the ED both here and OSH     8/2/23 pt seen here  Cerebellar infarct found on ct. Pt refused MRI, verbally abused staff. Left AMA    8/8/23 seen here  C/o vision change, verbally abused staff, left ama    8/9/23 seen at OSH "care everywhere"  Verbally abused staff. Left ama      Review of patient's allergies indicates:  No Known Allergies  Past Medical History:   Diagnosis Date    Acute on chronic pancreatitis 04/03/2016    Addiction to drug     CHF (congestive heart failure)     DKA (diabetic ketoacidoses) 02/2020    DM (diabetes mellitus), type 1     HCV (hepatitis C virus)     high VL     HTN (hypertension)     Hx of psychiatric care     Hypomagnesemia 05/25/2020    IVDU (intravenous drug user)     Heroin    Pancreatitis     Psychiatric problem     Substance abuse      Past Surgical History:   Procedure Laterality Date    CARDIAC SURGERY  1999    " stent placed. ([facility])    ESOPHAGOGASTRODUODENOSCOPY N/A 3/5/2020    Gastritis.  No H pylori.  Completed PPI for 1 month.  Procedure: EGD (ESOPHAGOGASTRODUODENOSCOPY);  Surgeon: Brinda Rene MD;  Location: [facility] ENDO;  Service: Endoscopy;  Laterality: N/A;  W421 A; [id number]    LEFT HEART CATHETERIZATION Left 9/28/2020    Procedure: Left heart cath;  Surgeon: Fito Rangel MD;  Location: [facility] CATH LAB;  Service: Cardiology;  Laterality: Left;    SHOULDER SURGERY  2013    right shoulder, spur removal.     Family History   Problem Relation Age of Onset    Asthma Mother      Social History     Tobacco Use    Smoking status: Every Day     Current packs/day: 0.00     Average packs/day: 1 pack/day for 39.2 years (39.2 ttl pk-yrs)     Types: Cigarettes     Start date: 4/3/1981     Last attempt to quit: 6/1/2020     Years since quitting: 3.2    Smokeless tobacco: Never    Tobacco comments:     states approx 1 per day.   Substance Use Topics    Alcohol use: Not Currently     Alcohol/week: 0.0 standard drinks of alcohol    Drug use: Yes     Types: Heroin, IV, Marijuana, Amphetamines     Comment: chronic methamphetamine user     Review of Systems   Constitutional:  Negative for fever.   HENT:  Negative for sore throat.    Respiratory:  Negative for shortness of breath.    Cardiovascular:  Negative for chest pain.   Gastrointestinal:  Negative for nausea.   Genitourinary:  Negative for dysuria.   Musculoskeletal:  Negative for back pain.   Skin:  Negative for rash.   Neurological:  Negative for weakness.   Hematological:  Does not bruise/bleed easily.   All other systems reviewed and are negative.      Physical Exam     Initial Vitals [08/15/23 1827]   BP Pulse Resp Temp SpO2   (!) 162/88 106 18 98.2 °F (36.8 °C) 98 %      MAP       --         Physical Exam    Nursing note and vitals reviewed.  Constitutional: He appears well-developed and well-nourished.   HENT:   Head: Normocephalic and atraumatic.   Eyes: EOM  are normal. Pupils are equal, round, and reactive to light.   Cardiovascular:  Normal rate and regular rhythm.           Pulmonary/Chest: Effort normal.   Abdominal: He exhibits no distension.   Musculoskeletal:         General: No tenderness or edema.     Neurological: He is alert and oriented to person, place, and time. No cranial nerve deficit.   Skin: Skin is warm and dry.   Psychiatric: He has a normal mood and affect.     Pt tracks me as I walk around room, and track staff as they walk in hallway      ED Course   Procedures    MEDICAL DECISION MAKING    After review of the patient's physical exam, ED testing, and history/symptoms, relevant labs, imaging, available outside records  a wide differential was considered including but not limited to: infectious, traumatic, vascular, toxicological , metabolic, malignant, ischemic, embolic, psychological, genetic, iatrogenic, idiopathic, medication reaction, environmental exposure, substance dependence/intoxication/withdrawal, electrolyte or blood dyscrasia, and other etiologies.        labs/imaging/interventions include:       Medications   iohexoL (OMNIPAQUE 350) injection 80 mL (80 mLs Intravenous Given 8/15/23 2052)     Labs Reviewed   CBC W/ AUTO DIFFERENTIAL - Abnormal; Notable for the following components:       Result Value    Hemoglobin 12.8 (*)     MCH 25.9 (*)     MCHC 31.4 (*)     RDW 16.7 (*)     MPV 8.8 (*)     Immature Granulocytes 0.6 (*)     All other components within normal limits   COMPREHENSIVE METABOLIC PANEL - Abnormal; Notable for the following components:    Glucose 140 (*)     Total Protein 9.0 (*)     Alkaline Phosphatase 152 (*)     All other components within normal limits   TSH - Abnormal; Notable for the following components:    TSH 0.176 (*)     All other components within normal limits   LIPID PANEL - Abnormal; Notable for the following components:    Cholesterol 297 (*)     Triglycerides 691 (*)     HDL/Cholesterol Ratio 15.2 (*)      Total Cholesterol/HDL Ratio 6.6 (*)     All other components within normal limits   URINALYSIS, REFLEX TO URINE CULTURE - Abnormal; Notable for the following components:    Specific Gravity, UA >1.030 (*)     Protein, UA 2+ (*)     Glucose, UA 3+ (*)     All other components within normal limits    Narrative:     Specimen Source->Urine   DRUG SCREEN PANEL, URINE EMERGENCY - Abnormal; Notable for the following components:    Benzodiazepines Presumptive Positive (*)     Amphetamine Screen, Ur Presumptive Positive (*)     All other components within normal limits    Narrative:     Specimen Source->Urine   ACETAMINOPHEN LEVEL - Abnormal; Notable for the following components:    Acetaminophen (Tylenol), Serum <3.0 (*)     All other components within normal limits   PROTIME-INR   ALCOHOL,MEDICAL (ETHANOL)   ACETAMINOPHEN LEVEL   ALCOHOL,MEDICAL (ETHANOL)   T4, FREE   URINALYSIS MICROSCOPIC    Narrative:     Specimen Source->Urine   POCT GLUCOSE MONITORING CONTINUOUS      CTA Head and Neck (xpd)   Final Result   Abnormal      Cut off involving a part of the P2 segment of the right PCA.  Differential consideration may include right-sided fetal type posterior cerebral artery.      Subacute to chronic infarct involving the right medial temporal lobe in the right occipital lobe. Remote right pontine and left lateral cerebellar hemisphere infarcts.  If persistent neurological abnormality, consider MRI of the brain with diffusion-weighted sequencing..      Stable ventriculomegaly or hydrocephalus.      Findings called to Dr. Callahan at 22:05 on 08/15/2023.      This report was flagged in Epic as abnormal.         Electronically signed by: Melva Mena   Date:    08/15/2023   Time:    22:15            Labs Reviewed   CBC W/ AUTO DIFFERENTIAL - Abnormal; Notable for the following components:       Result Value    Hemoglobin 12.8 (*)     MCH 25.9 (*)     MCHC 31.4 (*)     RDW 16.7 (*)     MPV 8.8 (*)     Immature Granulocytes 0.6  (*)     All other components within normal limits   COMPREHENSIVE METABOLIC PANEL - Abnormal; Notable for the following components:    Glucose 140 (*)     Total Protein 9.0 (*)     Alkaline Phosphatase 152 (*)     All other components within normal limits   TSH - Abnormal; Notable for the following components:    TSH 0.176 (*)     All other components within normal limits   LIPID PANEL - Abnormal; Notable for the following components:    Cholesterol 297 (*)     Triglycerides 691 (*)     HDL/Cholesterol Ratio 15.2 (*)     Total Cholesterol/HDL Ratio 6.6 (*)     All other components within normal limits   URINALYSIS, REFLEX TO URINE CULTURE - Abnormal; Notable for the following components:    Specific Gravity, UA >1.030 (*)     Protein, UA 2+ (*)     Glucose, UA 3+ (*)     All other components within normal limits    Narrative:     Specimen Source->Urine   DRUG SCREEN PANEL, URINE EMERGENCY - Abnormal; Notable for the following components:    Benzodiazepines Presumptive Positive (*)     Amphetamine Screen, Ur Presumptive Positive (*)     All other components within normal limits    Narrative:     Specimen Source->Urine   ACETAMINOPHEN LEVEL - Abnormal; Notable for the following components:    Acetaminophen (Tylenol), Serum <3.0 (*)     All other components within normal limits   PROTIME-INR   ALCOHOL,MEDICAL (ETHANOL)   ACETAMINOPHEN LEVEL   ALCOHOL,MEDICAL (ETHANOL)   T4, FREE   URINALYSIS MICROSCOPIC    Narrative:     Specimen Source->Urine   POCT GLUCOSE MONITORING CONTINUOUS     EKG Readings: (Independently Interpreted)   Hr 101 sinus tach, nl axis/intervals, no fernando/twi, non acute       Imaging Results               CTA Head and Neck (xpd) (Final result)  Result time 08/15/23 22:15:26      Final result by Melva Mena MD (08/15/23 22:15:26)                   Impression:      Cut off involving a part of the P2 segment of the right PCA.  Differential consideration may include right-sided fetal type posterior  cerebral artery.    Subacute to chronic infarct involving the right medial temporal lobe in the right occipital lobe. Remote right pontine and left lateral cerebellar hemisphere infarcts.  If persistent neurological abnormality, consider MRI of the brain with diffusion-weighted sequencing..    Stable ventriculomegaly or hydrocephalus.    Findings called to Dr. Callahan at 22:05 on 08/15/2023.    This report was flagged in Epic as abnormal.      Electronically signed by: Melva Mena  Date:    08/15/2023  Time:    22:15               Narrative:    EXAMINATION:  CTA HEAD AND NECK (XPD)    CLINICAL HISTORY:  Fell a week ago.  Has a headache and loss of vision since the fall.  Stated he fell backward and hit his head.    TECHNIQUE:  Non contrast low dose axial images were obtained through the head. CT angiogram was performed from the level of the kavin to the top of the head following the IV administration of 80mL of Omnipaque 350.   Sagittal and coronal reconstructions and maximum intensity projection reconstructions were performed. Arterial stenosis percentages are based on NASCET measurement criteria.    COMPARISON:  02/02/2023    FINDINGS:  Intracranial Compartment:    There is redemonstration of ventricular enlargement.  No extra-axial blood or fluid collections.    There is a subacute to chronic infarct involving the right medial temporal lobe and the right occipital lobe.  There is remote right pontine infarct.  There is remote left lateral cerebellar hemisphere.  No parenchymal mass, hemorrhage, edema, or major vascular distribution infarct.  There is a Dandy-Walker variant.    Skull/Extracranial Contents (limited evaluation): No fracture. Mastoid air cells and paranasal sinuses are essentially clear.    Non-Vascular Structures of the Neck/Thoracic Inlet (limited evaluation): Normal.    Aorta: Normal 3 vessel arch.    Extracranial carotid circulation: No hemodynamically significant stenosis, aneurysmal  "dilatation, or dissection.    Extracranial vertebral circulation: No hemodynamically significant stenosis, aneurysmal dilatation, or dissection.    Intracranial Arteries: Cut off involving a part of the P2 segment of the right PCA.  Vascular calcifications are seen involving the supraclinoid portions of the internal carotid arteries left greater than right.    Venous structures (limited evaluation): Normal.                                         Medications   iohexoL (OMNIPAQUE 350) injection 80 mL (80 mLs Intravenous Given 8/15/23 2052)      Unclear if this is a primary organic vs psychiatric condition  Have ordered cta's head/neck.     Pt continually asking for po's. Have d/w pt that we cannot give po's prior to the scans because he it is possible that he may require intervention depending on the scan results.               Informed by RN that patient has refused: ekg, iv, blood work of any kind, and states that he "just wants water".  I am concerned that the patient may be gravely disabled and unable to competently make decision. I will PEC patient consult tele-psych        Have given pt ice chips, now allowing blood draw.    Tele-psych has seen pt. Recommends continue PEC. Pt was commenting on facial expression of tele psych MD and also tracking him in room.      I have discussed this case and the imaging with Dr. Bishop with vascular neuro who agrees to review the imaging and provide recs. He feels that the patient should remain at  and recommends neuro consult. Pt remains PEC'd.    23:39 Pt now reports that his vision has returned. Able to name colors/shapes of RN bracelet charms    I have independently evaluated and interpreted all available labs and imaging to the extent of the scope of my practice.  The suspected diagnosis, treatment and plan were discussed with the patient. All questions or concerns have been addressed.    Note was created using voice recognition software. Note may have occasional " typographical or grammatical errors , garbled syntax, and other bizarre constructions that may not have been identified and edited despite good david initial review prior to signing.       Clinical Impression:   Final diagnoses:  [I63.9] Stroke (Primary)               Capo Callahan MD  08/15/23 2626

## 2023-08-15 NOTE — Clinical Note
Diagnosis: Stroke [674013]   Admitting Provider:: MAI ISIDRO [[id number]]   Future Attending Provider: INDIRA HUERTA [[id number]]   Reason for IP Medical Treatment  (Clinical interventions that can only be accomplished in the IP setting? ) :: Stroke, vision loss, pec   I certify that Inpatient services for greater than or equal to 2 midnights are medically necessary:: Yes   Plans for Post-Acute care--if anticipated (pick the single best option):: I. Nursing Home (alf) Comment: vision loss, grave disability. RN home vs [facility] facility

## 2023-08-16 PROBLEM — I63.9 STROKE: Status: ACTIVE | Noted: 2023-08-16

## 2023-08-16 PROBLEM — E10.9 TYPE 1 DIABETES: Status: ACTIVE | Noted: 2021-05-02

## 2023-08-16 LAB
ALBUMIN SERPL BCP-MCNC: 3 G/DL (ref 3.5–5.2)
ALP SERPL-CCNC: 131 U/L (ref 55–135)
ALT SERPL W/O P-5'-P-CCNC: 21 U/L (ref 10–44)
ANION GAP SERPL CALC-SCNC: 10 MMOL/L (ref 8–16)
ANION GAP SERPL CALC-SCNC: 10 MMOL/L (ref 8–16)
ANION GAP SERPL CALC-SCNC: 11 MMOL/L (ref 8–16)
ANION GAP SERPL CALC-SCNC: 14 MMOL/L (ref 8–16)
AST SERPL-CCNC: 19 U/L (ref 10–40)
AV INDEX (PROSTH): 0.83
AV MEAN GRADIENT: 3 MMHG
AV PEAK GRADIENT: 4 MMHG
AV VALVE AREA BY VELOCITY RATIO: 3 CM²
AV VALVE AREA: 3.2 CM²
AV VELOCITY RATIO: 0.78
B-OH-BUTYR BLD STRIP-SCNC: 2.3 MMOL/L (ref 0–0.5)
BASOPHILS # BLD AUTO: 0.01 K/UL (ref 0–0.2)
BASOPHILS NFR BLD: 0.2 % (ref 0–1.9)
BILIRUB SERPL-MCNC: 0.4 MG/DL (ref 0.1–1)
BSA FOR ECHO PROCEDURE: 1.81 M2
BUN SERPL-MCNC: 23 MG/DL (ref 6–20)
BUN SERPL-MCNC: 24 MG/DL (ref 6–20)
BUN SERPL-MCNC: 26 MG/DL (ref 6–20)
BUN SERPL-MCNC: 26 MG/DL (ref 6–20)
CALCIUM SERPL-MCNC: 8.6 MG/DL (ref 8.7–10.5)
CALCIUM SERPL-MCNC: 8.8 MG/DL (ref 8.7–10.5)
CALCIUM SERPL-MCNC: 9 MG/DL (ref 8.7–10.5)
CALCIUM SERPL-MCNC: 9.3 MG/DL (ref 8.7–10.5)
CHLORIDE SERPL-SCNC: 100 MMOL/L (ref 95–110)
CHLORIDE SERPL-SCNC: 100 MMOL/L (ref 95–110)
CHLORIDE SERPL-SCNC: 95 MMOL/L (ref 95–110)
CHLORIDE SERPL-SCNC: 95 MMOL/L (ref 95–110)
CO2 SERPL-SCNC: 20 MMOL/L (ref 23–29)
CO2 SERPL-SCNC: 23 MMOL/L (ref 23–29)
CO2 SERPL-SCNC: 23 MMOL/L (ref 23–29)
CO2 SERPL-SCNC: 24 MMOL/L (ref 23–29)
CREAT SERPL-MCNC: 1 MG/DL (ref 0.5–1.4)
CREAT SERPL-MCNC: 1.1 MG/DL (ref 0.5–1.4)
CREAT SERPL-MCNC: 1.2 MG/DL (ref 0.5–1.4)
CREAT SERPL-MCNC: 1.3 MG/DL (ref 0.5–1.4)
CV ECHO LV RWT: 0.48 CM
DIFFERENTIAL METHOD: ABNORMAL
DOP CALC AO PEAK VEL: 0.98 M/S
DOP CALC AO VTI: 19 CM
DOP CALC LVOT AREA: 3.9 CM2
DOP CALC LVOT DIAMETER: 2.22 CM
DOP CALC LVOT PEAK VEL: 0.76 M/S
DOP CALC LVOT STROKE VOLUME: 60.74 CM3
DOP CALCLVOT PEAK VEL VTI: 15.7 CM
E WAVE DECELERATION TIME: 112.16 MSEC
E/A RATIO: 0.61
E/E' RATIO: 6.14 M/S
ECHO LV POSTERIOR WALL: 1 CM (ref 0.6–1.1)
EOSINOPHIL # BLD AUTO: 0.1 K/UL (ref 0–0.5)
EOSINOPHIL NFR BLD: 1.7 % (ref 0–8)
ERYTHROCYTE [DISTWIDTH] IN BLOOD BY AUTOMATED COUNT: 16.6 % (ref 11.5–14.5)
EST. GFR  (NO RACE VARIABLE): >60 ML/MIN/1.73 M^2
ESTIMATED AVG GLUCOSE: 226 MG/DL (ref 68–131)
FRACTIONAL SHORTENING: 27 % (ref 28–44)
GLUCOSE SERPL-MCNC: 186 MG/DL (ref 70–110)
GLUCOSE SERPL-MCNC: 253 MG/DL (ref 70–110)
GLUCOSE SERPL-MCNC: 631 MG/DL (ref 70–110)
GLUCOSE SERPL-MCNC: 632 MG/DL (ref 70–110)
HBA1C MFR BLD: 9.5 % (ref 4–5.6)
HCT VFR BLD AUTO: 35 % (ref 40–54)
HGB BLD-MCNC: 11.4 G/DL (ref 14–18)
IMM GRANULOCYTES # BLD AUTO: 0.04 K/UL (ref 0–0.04)
IMM GRANULOCYTES NFR BLD AUTO: 0.8 % (ref 0–0.5)
INTERVENTRICULAR SEPTUM: 0.98 CM (ref 0.6–1.1)
IVC DIAMETER: 1.32 CM
IVRT: 53.28 MSEC
LA MAJOR: 3.6 CM
LEFT ATRIUM SIZE: 2.86 CM
LEFT INTERNAL DIMENSION IN SYSTOLE: 3.01 CM (ref 2.1–4)
LEFT VENTRICLE DIASTOLIC VOLUME INDEX: 42.24 ML/M2
LEFT VENTRICLE DIASTOLIC VOLUME: 76.45 ML
LEFT VENTRICLE MASS INDEX: 73 G/M2
LEFT VENTRICLE SYSTOLIC VOLUME INDEX: 19.5 ML/M2
LEFT VENTRICLE SYSTOLIC VOLUME: 35.38 ML
LEFT VENTRICULAR INTERNAL DIMENSION IN DIASTOLE: 4.15 CM (ref 3.5–6)
LEFT VENTRICULAR MASS: 132.78 G
LV LATERAL E/E' RATIO: 5.38 M/S
LV SEPTAL E/E' RATIO: 7.17 M/S
LVOT MG: 1.25 MMHG
LVOT MV: 0.52 CM/S
LYMPHOCYTES # BLD AUTO: 1.3 K/UL (ref 1–4.8)
LYMPHOCYTES NFR BLD: 24.5 % (ref 18–48)
MAGNESIUM SERPL-MCNC: 1.9 MG/DL (ref 1.6–2.6)
MAGNESIUM SERPL-MCNC: 2 MG/DL (ref 1.6–2.6)
MCH RBC QN AUTO: 25.9 PG (ref 27–31)
MCHC RBC AUTO-ENTMCNC: 32.6 G/DL (ref 32–36)
MCV RBC AUTO: 79 FL (ref 82–98)
MONOCYTES # BLD AUTO: 0.7 K/UL (ref 0.3–1)
MONOCYTES NFR BLD: 12.5 % (ref 4–15)
MV PEAK A VEL: 0.7 M/S
MV PEAK E VEL: 0.43 M/S
MV STENOSIS PRESSURE HALF TIME: 32.53 MS
MV VALVE AREA P 1/2 METHOD: 6.76 CM2
NEUTROPHILS # BLD AUTO: 3.2 K/UL (ref 1.8–7.7)
NEUTROPHILS NFR BLD: 60.3 % (ref 38–73)
NRBC BLD-RTO: 0 /100 WBC
PHOSPHATE SERPL-MCNC: 3.7 MG/DL (ref 2.7–4.5)
PISA TR MAX VEL: 1.03 M/S
PLATELET # BLD AUTO: 341 K/UL (ref 150–450)
PMV BLD AUTO: 9.5 FL (ref 9.2–12.9)
POCT GLUCOSE: 148 MG/DL (ref 70–110)
POCT GLUCOSE: 210 MG/DL (ref 70–110)
POCT GLUCOSE: 248 MG/DL (ref 70–110)
POCT GLUCOSE: 362 MG/DL (ref 70–110)
POCT GLUCOSE: >500 MG/DL (ref 70–110)
POTASSIUM SERPL-SCNC: 4.3 MMOL/L (ref 3.5–5.1)
POTASSIUM SERPL-SCNC: 4.7 MMOL/L (ref 3.5–5.1)
POTASSIUM SERPL-SCNC: 5.1 MMOL/L (ref 3.5–5.1)
POTASSIUM SERPL-SCNC: 5.2 MMOL/L (ref 3.5–5.1)
PROT SERPL-MCNC: 7.2 G/DL (ref 6–8.4)
PV PEAK GRADIENT: 3 MMHG
PV PEAK VELOCITY: 0.88 M/S
RA MAJOR: 3.33 CM
RA PRESSURE ESTIMATED: 3 MMHG
RA WIDTH: 2.91 CM
RBC # BLD AUTO: 4.41 M/UL (ref 4.6–6.2)
RIGHT VENTRICULAR END-DIASTOLIC DIMENSION: 3 CM
RV TB RVSP: 4 MMHG
SINUS: 2.98 CM
SODIUM SERPL-SCNC: 129 MMOL/L (ref 136–145)
SODIUM SERPL-SCNC: 129 MMOL/L (ref 136–145)
SODIUM SERPL-SCNC: 133 MMOL/L (ref 136–145)
SODIUM SERPL-SCNC: 134 MMOL/L (ref 136–145)
STJ: 2.3 CM
TDI LATERAL: 0.08 M/S
TDI SEPTAL: 0.06 M/S
TDI: 0.07 M/S
TR MAX PG: 4 MMHG
TRICUSPID ANNULAR PLANE SYSTOLIC EXCURSION: 1.21 CM
TV PEAK GRADIENT: 1 MMHG
TV REST PULMONARY ARTERY PRESSURE: 7 MMHG
WBC # BLD AUTO: 5.3 K/UL (ref 3.9–12.7)
Z-SCORE OF LEFT VENTRICULAR DIMENSION IN END DIASTOLE: -1.88
Z-SCORE OF LEFT VENTRICULAR DIMENSION IN END SYSTOLE: -0.22

## 2023-08-16 PROCEDURE — 25000003 PHARM REV CODE 250: Performed by: HOSPITALIST

## 2023-08-16 PROCEDURE — 99448 NTRPROF PH1/NTRNET/EHR 21-30: CPT | Mod: ,,, | Performed by: PSYCHIATRY & NEUROLOGY

## 2023-08-16 PROCEDURE — G0426 INPT/ED TELECONSULT50: HCPCS | Mod: 95,,, | Performed by: PSYCHIATRY & NEUROLOGY

## 2023-08-16 PROCEDURE — G0426 PR INPT TELEHEALTH CONSULT 50M: ICD-10-PCS | Mod: 95,,, | Performed by: PSYCHIATRY & NEUROLOGY

## 2023-08-16 PROCEDURE — 82010 KETONE BODYS QUAN: CPT | Performed by: HOSPITALIST

## 2023-08-16 PROCEDURE — 97535 SELF CARE MNGMENT TRAINING: CPT

## 2023-08-16 PROCEDURE — 25000003 PHARM REV CODE 250: Performed by: STUDENT IN AN ORGANIZED HEALTH CARE EDUCATION/TRAINING PROGRAM

## 2023-08-16 PROCEDURE — 99448 PR INTERPROF, PHONE/INTERNET/EHR, CONSULT, 21-30 MINS: ICD-10-PCS | Mod: ,,, | Performed by: PSYCHIATRY & NEUROLOGY

## 2023-08-16 PROCEDURE — 63600175 PHARM REV CODE 636 W HCPCS: Performed by: HOSPITALIST

## 2023-08-16 PROCEDURE — 80048 BASIC METABOLIC PNL TOTAL CA: CPT | Mod: 91 | Performed by: HOSPITALIST

## 2023-08-16 PROCEDURE — 85025 COMPLETE CBC W/AUTO DIFF WBC: CPT | Performed by: HOSPITALIST

## 2023-08-16 PROCEDURE — 80053 COMPREHEN METABOLIC PANEL: CPT | Performed by: HOSPITALIST

## 2023-08-16 PROCEDURE — 21400001 HC TELEMETRY ROOM

## 2023-08-16 PROCEDURE — 97165 OT EVAL LOW COMPLEX 30 MIN: CPT

## 2023-08-16 PROCEDURE — 83735 ASSAY OF MAGNESIUM: CPT | Mod: 91 | Performed by: HOSPITALIST

## 2023-08-16 PROCEDURE — 97161 PT EVAL LOW COMPLEX 20 MIN: CPT

## 2023-08-16 PROCEDURE — 83036 HEMOGLOBIN GLYCOSYLATED A1C: CPT | Performed by: HOSPITALIST

## 2023-08-16 PROCEDURE — 84100 ASSAY OF PHOSPHORUS: CPT | Performed by: HOSPITALIST

## 2023-08-16 PROCEDURE — 92610 EVALUATE SWALLOWING FUNCTION: CPT

## 2023-08-16 PROCEDURE — 36415 COLL VENOUS BLD VENIPUNCTURE: CPT | Performed by: HOSPITALIST

## 2023-08-16 PROCEDURE — 97116 GAIT TRAINING THERAPY: CPT

## 2023-08-16 PROCEDURE — S0166 INJ OLANZAPINE 2.5MG: HCPCS | Performed by: STUDENT IN AN ORGANIZED HEALTH CARE EDUCATION/TRAINING PROGRAM

## 2023-08-16 RX ORDER — LANOLIN ALCOHOL/MO/W.PET/CERES
800 CREAM (GRAM) TOPICAL
Status: DISCONTINUED | OUTPATIENT
Start: 2023-08-16 | End: 2023-08-22 | Stop reason: HOSPADM

## 2023-08-16 RX ORDER — PROCHLORPERAZINE EDISYLATE 5 MG/ML
5 INJECTION INTRAMUSCULAR; INTRAVENOUS EVERY 6 HOURS PRN
Status: DISCONTINUED | OUTPATIENT
Start: 2023-08-16 | End: 2023-08-22 | Stop reason: HOSPADM

## 2023-08-16 RX ORDER — IBUPROFEN 200 MG
24 TABLET ORAL
Status: DISCONTINUED | OUTPATIENT
Start: 2023-08-16 | End: 2023-08-22 | Stop reason: HOSPADM

## 2023-08-16 RX ORDER — ACETAMINOPHEN 325 MG/1
650 TABLET ORAL EVERY 4 HOURS PRN
Status: DISCONTINUED | OUTPATIENT
Start: 2023-08-16 | End: 2023-08-22 | Stop reason: HOSPADM

## 2023-08-16 RX ORDER — LANCETS 33 GAUGE
EACH MISCELLANEOUS 4 TIMES DAILY
Status: ON HOLD | COMMUNITY
Start: 2023-07-28 | End: 2024-03-11

## 2023-08-16 RX ORDER — SODIUM CHLORIDE 0.9 % (FLUSH) 0.9 %
10 SYRINGE (ML) INJECTION EVERY 12 HOURS PRN
Status: DISCONTINUED | OUTPATIENT
Start: 2023-08-16 | End: 2023-08-22 | Stop reason: HOSPADM

## 2023-08-16 RX ORDER — SODIUM,POTASSIUM PHOSPHATES 280-250MG
2 POWDER IN PACKET (EA) ORAL
Status: DISCONTINUED | OUTPATIENT
Start: 2023-08-16 | End: 2023-08-22 | Stop reason: HOSPADM

## 2023-08-16 RX ORDER — SODIUM CHLORIDE 9 MG/ML
INJECTION, SOLUTION INTRAVENOUS CONTINUOUS
Status: DISCONTINUED | OUTPATIENT
Start: 2023-08-16 | End: 2023-08-17

## 2023-08-16 RX ORDER — BISACODYL 10 MG
10 SUPPOSITORY, RECTAL RECTAL DAILY PRN
Status: DISCONTINUED | OUTPATIENT
Start: 2023-08-16 | End: 2023-08-22 | Stop reason: HOSPADM

## 2023-08-16 RX ORDER — TALC
6 POWDER (GRAM) TOPICAL NIGHTLY PRN
Status: DISCONTINUED | OUTPATIENT
Start: 2023-08-16 | End: 2023-08-22 | Stop reason: HOSPADM

## 2023-08-16 RX ORDER — BLOOD SUGAR DIAGNOSTIC
STRIP MISCELLANEOUS
Status: ON HOLD | COMMUNITY
Start: 2023-06-18 | End: 2024-03-11

## 2023-08-16 RX ORDER — IBUPROFEN 200 MG
16 TABLET ORAL
Status: DISCONTINUED | OUTPATIENT
Start: 2023-08-16 | End: 2023-08-22 | Stop reason: HOSPADM

## 2023-08-16 RX ORDER — LABETALOL HYDROCHLORIDE 5 MG/ML
10 INJECTION, SOLUTION INTRAVENOUS
Status: DISCONTINUED | OUTPATIENT
Start: 2023-08-16 | End: 2023-08-22 | Stop reason: HOSPADM

## 2023-08-16 RX ORDER — CLOPIDOGREL BISULFATE 75 MG/1
75 TABLET ORAL DAILY
Status: DISCONTINUED | OUTPATIENT
Start: 2023-08-16 | End: 2023-08-22 | Stop reason: HOSPADM

## 2023-08-16 RX ORDER — INSULIN ASPART 100 [IU]/ML
0-5 INJECTION, SOLUTION INTRAVENOUS; SUBCUTANEOUS
Status: DISCONTINUED | OUTPATIENT
Start: 2023-08-16 | End: 2023-08-16

## 2023-08-16 RX ORDER — ENOXAPARIN SODIUM 100 MG/ML
40 INJECTION SUBCUTANEOUS EVERY 24 HOURS
Status: DISCONTINUED | OUTPATIENT
Start: 2023-08-16 | End: 2023-08-22 | Stop reason: HOSPADM

## 2023-08-16 RX ORDER — INSULIN ASPART 100 [IU]/ML
7 INJECTION, SOLUTION INTRAVENOUS; SUBCUTANEOUS
Status: DISCONTINUED | OUTPATIENT
Start: 2023-08-16 | End: 2023-08-21

## 2023-08-16 RX ORDER — INSULIN ASPART 100 [IU]/ML
1-10 INJECTION, SOLUTION INTRAVENOUS; SUBCUTANEOUS EVERY 4 HOURS
Status: DISCONTINUED | OUTPATIENT
Start: 2023-08-16 | End: 2023-08-16

## 2023-08-16 RX ORDER — INSULIN ASPART 100 [IU]/ML
20 INJECTION, SOLUTION INTRAVENOUS; SUBCUTANEOUS ONCE
Status: COMPLETED | OUTPATIENT
Start: 2023-08-16 | End: 2023-08-16

## 2023-08-16 RX ORDER — ONDANSETRON 4 MG/1
4 TABLET, ORALLY DISINTEGRATING ORAL EVERY 6 HOURS PRN
Status: DISCONTINUED | OUTPATIENT
Start: 2023-08-16 | End: 2023-08-20

## 2023-08-16 RX ORDER — LANCETS 30 GAUGE
EACH MISCELLANEOUS 4 TIMES DAILY
Status: ON HOLD | COMMUNITY
Start: 2023-07-28 | End: 2024-03-11

## 2023-08-16 RX ORDER — GLUCAGON 1 MG
1 KIT INJECTION
Status: DISCONTINUED | OUTPATIENT
Start: 2023-08-16 | End: 2023-08-22 | Stop reason: HOSPADM

## 2023-08-16 RX ORDER — QUETIAPINE FUMARATE 100 MG/1
100 TABLET, FILM COATED ORAL NIGHTLY
Status: DISCONTINUED | OUTPATIENT
Start: 2023-08-16 | End: 2023-08-20

## 2023-08-16 RX ORDER — OLANZAPINE 10 MG/2ML
5 INJECTION, POWDER, FOR SOLUTION INTRAMUSCULAR EVERY 8 HOURS PRN
Status: DISCONTINUED | OUTPATIENT
Start: 2023-08-16 | End: 2023-08-20

## 2023-08-16 RX ORDER — MUPIROCIN 20 MG/G
OINTMENT TOPICAL 2 TIMES DAILY
Status: DISPENSED | OUTPATIENT
Start: 2023-08-16 | End: 2023-08-21

## 2023-08-16 RX ORDER — INSULIN GLARGINE 100 [IU]/ML
INJECTION, SOLUTION SUBCUTANEOUS
Status: ON HOLD | COMMUNITY
Start: 2023-08-10 | End: 2023-08-30 | Stop reason: HOSPADM

## 2023-08-16 RX ORDER — NAPROXEN SODIUM 220 MG/1
81 TABLET, FILM COATED ORAL DAILY
Status: DISCONTINUED | OUTPATIENT
Start: 2023-08-16 | End: 2023-08-22 | Stop reason: HOSPADM

## 2023-08-16 RX ORDER — GABAPENTIN 300 MG/1
300 CAPSULE ORAL 2 TIMES DAILY
Status: ON HOLD | COMMUNITY
Start: 2023-07-18 | End: 2023-08-30 | Stop reason: HOSPADM

## 2023-08-16 RX ORDER — SODIUM CHLORIDE 0.9 % (FLUSH) 0.9 %
10 SYRINGE (ML) INJECTION
Status: DISCONTINUED | OUTPATIENT
Start: 2023-08-16 | End: 2023-08-22 | Stop reason: HOSPADM

## 2023-08-16 RX ORDER — SIMETHICONE 80 MG
1 TABLET,CHEWABLE ORAL 4 TIMES DAILY PRN
Status: DISCONTINUED | OUTPATIENT
Start: 2023-08-16 | End: 2023-08-22 | Stop reason: HOSPADM

## 2023-08-16 RX ORDER — MAG HYDROX/ALUMINUM HYD/SIMETH 200-200-20
30 SUSPENSION, ORAL (FINAL DOSE FORM) ORAL 4 TIMES DAILY PRN
Status: DISCONTINUED | OUTPATIENT
Start: 2023-08-16 | End: 2023-08-22 | Stop reason: HOSPADM

## 2023-08-16 RX ORDER — ACETAMINOPHEN 325 MG/1
650 TABLET ORAL EVERY 8 HOURS PRN
Status: DISCONTINUED | OUTPATIENT
Start: 2023-08-16 | End: 2023-08-22 | Stop reason: HOSPADM

## 2023-08-16 RX ORDER — METOPROLOL SUCCINATE 25 MG/1
25 TABLET, EXTENDED RELEASE ORAL DAILY
Status: DISCONTINUED | OUTPATIENT
Start: 2023-08-16 | End: 2023-08-22 | Stop reason: HOSPADM

## 2023-08-16 RX ORDER — ATORVASTATIN CALCIUM 10 MG/1
20 TABLET, FILM COATED ORAL DAILY
Status: DISCONTINUED | OUTPATIENT
Start: 2023-08-16 | End: 2023-08-22 | Stop reason: HOSPADM

## 2023-08-16 RX ORDER — IPRATROPIUM BROMIDE AND ALBUTEROL SULFATE 2.5; .5 MG/3ML; MG/3ML
3 SOLUTION RESPIRATORY (INHALATION) EVERY 4 HOURS PRN
Status: DISCONTINUED | OUTPATIENT
Start: 2023-08-16 | End: 2023-08-22 | Stop reason: HOSPADM

## 2023-08-16 RX ORDER — INSULIN ASPART 100 [IU]/ML
1-10 INJECTION, SOLUTION INTRAVENOUS; SUBCUTANEOUS EVERY 4 HOURS PRN
Status: DISCONTINUED | OUTPATIENT
Start: 2023-08-16 | End: 2023-08-17

## 2023-08-16 RX ORDER — POLYETHYLENE GLYCOL 3350 17 G/17G
17 POWDER, FOR SOLUTION ORAL DAILY
Status: DISCONTINUED | OUTPATIENT
Start: 2023-08-16 | End: 2023-08-22 | Stop reason: HOSPADM

## 2023-08-16 RX ORDER — NALOXONE HCL 0.4 MG/ML
0.02 VIAL (ML) INJECTION
Status: DISCONTINUED | OUTPATIENT
Start: 2023-08-16 | End: 2023-08-22 | Stop reason: HOSPADM

## 2023-08-16 RX ADMIN — SODIUM CHLORIDE: 9 INJECTION, SOLUTION INTRAVENOUS at 08:08

## 2023-08-16 RX ADMIN — ONDANSETRON 4 MG: 4 TABLET, ORALLY DISINTEGRATING ORAL at 10:08

## 2023-08-16 RX ADMIN — ASPIRIN 81 MG CHEWABLE TABLET 81 MG: 81 TABLET CHEWABLE at 09:08

## 2023-08-16 RX ADMIN — INSULIN ASPART 20 UNITS: 100 INJECTION, SOLUTION INTRAVENOUS; SUBCUTANEOUS at 07:08

## 2023-08-16 RX ADMIN — ACETAMINOPHEN 650 MG: 325 TABLET ORAL at 12:08

## 2023-08-16 RX ADMIN — METOPROLOL SUCCINATE 25 MG: 25 TABLET, EXTENDED RELEASE ORAL at 01:08

## 2023-08-16 RX ADMIN — CLOPIDOGREL BISULFATE 75 MG: 75 TABLET, FILM COATED ORAL at 09:08

## 2023-08-16 RX ADMIN — INSULIN DETEMIR 20 UNITS: 100 INJECTION, SOLUTION SUBCUTANEOUS at 06:08

## 2023-08-16 RX ADMIN — OLANZAPINE 5 MG: 10 INJECTION, POWDER, FOR SOLUTION INTRAMUSCULAR at 03:08

## 2023-08-16 RX ADMIN — Medication 6 MG: at 12:08

## 2023-08-16 RX ADMIN — INSULIN ASPART 4 UNITS: 100 INJECTION, SOLUTION INTRAVENOUS; SUBCUTANEOUS at 04:08

## 2023-08-16 RX ADMIN — INSULIN ASPART 10 UNITS: 100 INJECTION, SOLUTION INTRAVENOUS; SUBCUTANEOUS at 10:08

## 2023-08-16 RX ADMIN — INSULIN ASPART 7 UNITS: 100 INJECTION, SOLUTION INTRAVENOUS; SUBCUTANEOUS at 04:08

## 2023-08-16 RX ADMIN — ATORVASTATIN CALCIUM 20 MG: 10 TABLET, FILM COATED ORAL at 09:08

## 2023-08-16 RX ADMIN — SODIUM CHLORIDE: 9 INJECTION, SOLUTION INTRAVENOUS at 10:08

## 2023-08-16 NOTE — ED NOTES
Pt AAO 4, aware of plan of care. ED tech at bedside for 1:1 observation. Pt denies SI/HI, denies AH/VH.

## 2023-08-16 NOTE — HPI
This is a 57-year-old male with a past medical history of type 1 diabetes, COPD (not on O2), CAD, hypertension, hyperlipidemia, substance use, mood disorder who presents with blurry vision.    Patient had multiple ED presentations for headaches, blurry vision, eye pain this month.  On 08/02, he presented to [facility] and had a CT head that showed a new left cerebellar infarct, however left against medical advice.  He returned to  ED on 8/7 and to [facility] ED for worsening vision loss, however left AMA.  He had a brief hospitalization to  on 8/9 for DKA after presenting with similar symptoms as well.  Patient is known to be verbally abusive to staff.    In the ED, the patient was hypertensive.  Labs were remarkable for hypercholesterolemia (297).  UDS was positive for amphetamines and benzodiazepines.  CTA head showed Cut off involving a part of the P2 segment of the right PCA,  subacute to chronic infarct involving the right medial temporal lobe in the right occipital lobe & remote right pontine and left lateral cerebellar hemisphere infarcts.  Vascular Neurology was consulted and stated patient can stay at [facility].  Psychiatry was consulted and recommended pec giving grave disability.  Patient was given aspirin 325 mg and was admitted for further management.

## 2023-08-16 NOTE — ED TRIAGE NOTES
Pt reports to ED for right sided HA from a fall a week ago and blurry vision since the fall. He stated he fell backwards and hit his head.   Pt does not answer further questions.

## 2023-08-16 NOTE — ASSESSMENT & PLAN NOTE
-Urine drug screen positive for amphetamines and benzodiazepines  -Patient denies use  -Likely contributor to medical illness as well as mood alterations and combativeness.  -Counseled on cessation

## 2023-08-16 NOTE — ASSESSMENT & PLAN NOTE
-Noted agitation, combativeness and grandiosity of thoughts  -Seen by tele-psych in ER and given provisional diagnosis of Ila and r/o mood disorder  -Placed under PEC and recommended inpatient psych placement once medically stable  -OK to use Geodon or Zyprexa PO/IM q 6 hours prn severe agitation   -Resume home seroquel qhs

## 2023-08-16 NOTE — NURSING
Pt woke up from a nap, began screaming, saying it's his mother's fault, pulling out IV fluids, saying he was leaving, pulled off his telemetry monitor and threw it on the floor, using profanity and was not following commands.     Security called and Zyprexa given.

## 2023-08-16 NOTE — CONSULTS
[facility]  Vascular Neurology  Comprehensive Stroke Center  TeleVascular Neurology Interprofessional Consult Note          Consulting Provider: CAROLE ROBINS  Patient Location: [facility]    Summary of patient's symptoms:  60 yo male presenting with loss of vision.  Fall approximately one week ago and has had sx since.  Pt w hx of polysubstance abuse.    Images personally interpreted:  Head CT from August 2 and August --> Small L cerebellar stroke and early ischemic changes in the R PCA territory (not reported, but noted in hindsight)      Head CT from August 15 --> clearly delineated subacute to chronic stroke on the R PCA territory.      CTA head and neck from August 15.  L Vert ends in PICA  R PCA occluded after P2    Assessment and plan:  Likely etiology is related to drug use.  Suspect cardiac source due to drug induced arrhyhtmia.  Dissection in differential after the fall, but not seen on imaging.    Dual antiplatelets and statin is initial recommendation for stroke prevention.  Risk modification.  Rehab efforts.  Tele-vascular neurology consult for full evaluation.      I spent approximately 24 minutes on this encounter. More than half of that time was spent communicating with the consulting provider and coordinating patient care.        Jonas Bishop MD  Vascular and Interventional Neurology  , Department of Neurology  Section Head, Vascular Neurology  Departments of Neurology, Neurosurgery and Radiology  [facility] [place], [place]    This encounter was conducted as an interprofessional communication between providers at the [facility] and vascular neurologist. The interaction was completed over the phone or via secure messaging (electronic medical record - [facility] Secure Chat).    Once this note was completed, a written copy was sent back to the provider via fax or electronic medical record.

## 2023-08-16 NOTE — ASSESSMENT & PLAN NOTE
-Admitted to inpatient status  -Presented for evaluation of backwards fall a week ago and vision changes since then  -Noted CT head (8/2) showing a new left cerebellar infarct.  Patient left AMA at that time  -In ER this admit, CTA head showed cut off involving a part of the P2 segment of the right PCA, subacute to chronic infarct involving the right medial temporal lobe in the right occipital lobe & remote right pontine and left lateral cerebellar hemisphere infarcts.    -Suspect related to substance abuse - noted amphetamines in UDS  -Check A1c, FLP/LDL, Echo with bubble and CUS.  -Consult tele-neurology, PT, OT and SLP  -Given likely subacute nature as well as resolution of visual changes will pursue normotension  -Treat with DAPT - plavix x 21 days and aspirin 81mg indefintely  -Continue statin  -Patient presently under PEC.

## 2023-08-16 NOTE — PROGRESS NOTES
[facility] - Outreach Attempt    Community Health Worker to attempt to contact patient on: 8/16/23 2nd missed initial outreach attempt call, patient is currently admitted in the hospital.

## 2023-08-16 NOTE — ASSESSMENT & PLAN NOTE
CT head (8/2) that showed a new left cerebellar infarct, however left against medical advice.    CTA head showed Cut off involving a part of the P2 segment of the right PCA,  subacute to chronic infarct involving the right medial temporal lobe in the right occipital lobe & remote right pontine and left lateral cerebellar hemisphere infarcts.      Plan:  Consult tele neurology  Continue aspirin, statin  Stroke workup

## 2023-08-16 NOTE — PT/OT/SLP EVAL
Occupational Therapy   Evaluation    Name: Cali Mabry  MRN: [id number]  Admitting Diagnosis: Stroke  Recent Surgery: * No surgery found *      Recommendations:     Discharge Recommendations: home health OT (with family supervision)  Discharge Equipment Recommendations:  none  Barriers to discharge:  None    Assessment:     Cali Mabry is a 57 y.o. male with a medical diagnosis of Stroke. Performance deficits affecting function: visual deficits, impaired self care skills, impaired functional mobility, gait instability, weakness, decreased safety awareness.      Rehab Prognosis: Good; patient would benefit from acute skilled OT services to address these deficits and reach maximum level of function.       Plan:     Patient to be seen 2 x/week to address the above listed problems via self-care/home management, therapeutic activities, therapeutic exercises  Plan of Care Expires: 08/30/23  Plan of Care Reviewed with: patient    Subjective     Chief Complaint: vision still isn't fully back yet   Patient/Family Comments/goals: initially refused to change paper scrubs with safety sitter despite being soiled, but then agreeable with therapy; pt was calm and participatory for session     Occupational Profile:  Living Environment: pt lives with his mother, niece, and nephew in a [facility] with 0 GRAY.   Previous level of function: independent   Roles and Routines: stopped driving   Equipment Used at Home: none  Assistance upon Discharge: family     Pain/Comfort:  Pain Rating 1:  (c/o back pain in supine)  Pain Addressed 1: Reposition, Distraction    Patients cultural, spiritual, Sabianism conflicts given the current situation: no    Objective:     Communicated with: nurseQuincy, prior to session.  Patient found right sidelying with peripheral IV, telemetry (safety sitter) upon OT entry to room.    General Precautions: Standard, fall, diabetic (PEC)  Orthopedic Precautions: N/A  Braces: N/A  Respiratory Status: Room  air    Occupational Performance:    Bed Mobility:    Patient completed Rolling/Turning to Left with  contact guard assistance  Patient completed Scooting with contact guard assistance  Patient completed Supine to Sit with contact guard assistance    Functional Mobility/Transfers:  Patient completed Sit <> Stand Transfer with contact guard assistance  with  hand-held assist   Functional Mobility: pt completed in-room functional mobility with CGA and hand-held assistance.     Activities of Daily Living:  Grooming: contact guard assistance for dynamic standing balance while pt combed his hair and washed his face. Pt required min A with verbal/tactile cueing to find washcloth on the counter and the paper towel d/t impaired vision. Pt was able to identify both faucets and grasp each without difficulty.   Upper Body Dressing: total A to doff paper scrub by safety sitter while seated EOB 2* wet. Pt donned clean paper scrub top with stand-by assistance   Lower Body Dressing: stand-by assistance to doff soiled paper scrub pants while seated unsupported at EOB then finished in standing with CGA for balance; min A to don clean ones    Feeding: pt able to reach and grasp jose cracker and cup from SLP while seated unsupported and fed himself without error     Cognitive/Visual Perceptual:  Cognitive/Psychosocial Skills:     -       Follows Commands/attention:follows simple commands   -       Communication: clear/fluent  -       Memory: No Deficits noted  -       Safety awareness/insight to disability: impaired   -       Mood/Affect/Coping skills/emotional control: calm and participatory   Visual/Perceptual:      -Intact  R/L discrimination  -Impaired  acuity      Physical Exam:  Balance:    -       seated: SBA; standing: CGA with HHA   Postural examination/scapula alignment:    -       Rounded shoulders  Skin integrity: Visible skin intact  Edema:  no BUE edema noted   Upper Extremity Range of Motion:     -       Right Upper  Extremity: WFL  -       Left Upper Extremity: WFL  Upper Extremity Strength:    -       Right Upper Extremity: WFL  -       Left Upper Extremity: WFL   Strength:    -       Right Upper Extremity: WFL  -       Left Upper Extremity: WFL  Fine Motor Coordination:    -       Intact  Left hand, manipulation of objects and Right hand, manipulation of objects  Gross motor coordination:   WFL    AMPAC 6 Click ADL:  AMPAC Total Score: 18    Treatment & Education:  Pt educated on OT role/POC.   Importance of OOB activity with staff assistance.  Safety during functional t/f and mobility   Multiple self-care tasks/functional mobility completed- assistance level noted above   All questions/concerns answered within OT scope of practice       Patient left  reclined in the chair with B/L heels offloaded by pillow and tray table (cleared) in front of pt  with all lines intact, call button in reach, nurse notified, safety sitter present, and all needs met/within reach; safety sitter following up to find call bell so pt can watch tv     GOALS:   Multidisciplinary Problems       Occupational Therapy Goals          Problem: Occupational Therapy    Goal Priority Disciplines Outcome Interventions   Occupational Therapy Goal     OT, PT/OT Ongoing, Progressing    Description: Goals to be met by: 08/30/23     Patient will increase functional independence with ADLs by performing:    LE Dressing with Supervision.  Grooming while standing at sink with Supervision.  Toileting from toilet with Supervision for hygiene and clothing management.   Supine to sit with Supervision.  Step transfer with Supervision  Toilet transfer to toilet with Supervision.  Upper extremity exercise program x15 reps per handout, with independence.                         History:     Past Medical History:   Diagnosis Date    Acute on chronic pancreatitis 04/03/2016    Addiction to drug     CHF (congestive heart failure)     DKA (diabetic ketoacidoses) 02/2020    DM  (diabetes mellitus), type 1     HCV (hepatitis C virus)     high VL     HTN (hypertension)     Hx of psychiatric care     Hypomagnesemia 05/25/2020    IVDU (intravenous drug user)     Heroin    Pancreatitis     Psychiatric problem     Substance abuse          Past Surgical History:   Procedure Laterality Date    CARDIAC SURGERY  1999    stent placed. ([facility])    ESOPHAGOGASTRODUODENOSCOPY N/A 3/5/2020    Gastritis.  No H pylori.  Completed PPI for 1 month.  Procedure: EGD (ESOPHAGOGASTRODUODENOSCOPY);  Surgeon: Brinda Rene MD;  Location: [facility] ENDO;  Service: Endoscopy;  Laterality: N/A;  W421 A; [phone]    LEFT HEART CATHETERIZATION Left 9/28/2020    Procedure: Left heart cath;  Surgeon: Fito Rangel MD;  Location: [facility] CATH LAB;  Service: Cardiology;  Laterality: Left;    SHOULDER SURGERY  2013    right shoulder, spur removal.       Time Tracking:     OT Date of Treatment: 08/16/23  OT Start Time: 1045  OT Stop Time: 1109  OT Total Time (min): 24 min    Billable Minutes:Evaluation 15 min  Self Care/Home Management 9 min  Total Time 24 min (co-eval with PT; SLP was present partially)     8/16/2023

## 2023-08-16 NOTE — NURSING
Informed Md that pt is refusing continuous fluids, pt educated on the importance but still refused.     1201 - informed, pt is refusing labs.    1230 - Educated pt on the importance of receiving fluids and having labs drawn. Pt agrees to restart fluids and have labs drawn.

## 2023-08-16 NOTE — SUBJECTIVE & OBJECTIVE
Interval History: No acute events overnight.  At time of my visit he is calm and collected.  States vision has returned to normal.  Only complaint is nausea.  Appears mildly confused.  All questions answered and patient had no further complaints.    Objective:     Vital Signs (Most Recent):  Temp: 97.7 °F (36.5 °C) (08/16/23 1131)  Pulse: 92 (08/16/23 1131)  Resp: 18 (08/16/23 1131)  BP: 131/80 (08/16/23 1131)  SpO2: 95 % (08/16/23 1131) Vital Signs (24h Range):  Temp:  [97.7 °F (36.5 °C)-98.6 °F (37 °C)] 97.7 °F (36.5 °C)  Pulse:  [] 92  Resp:  [15-22] 18  SpO2:  [95 %-99 %] 95 %  BP: (131-170)/(71-88) 131/80     Weight: 68 kg (150 lb)  Body mass index is 22.81 kg/m².  No intake or output data in the 24 hours ending 08/16/23 1132      Physical Exam  Vitals and nursing note reviewed.   Constitutional:       General: He is not in acute distress.     Appearance: Normal appearance. He is ill-appearing.   HENT:      Head: Normocephalic and atraumatic.      Nose: Nose normal.      Mouth/Throat:      Mouth: Mucous membranes are moist.   Eyes:      Extraocular Movements: Extraocular movements intact.   Cardiovascular:      Rate and Rhythm: Regular rhythm. Tachycardia present.      Pulses: Normal pulses.      Heart sounds: No murmur heard.  Pulmonary:      Effort: Pulmonary effort is normal. No respiratory distress.   Abdominal:      General: Abdomen is flat.      Palpations: Abdomen is soft.      Tenderness: There is no abdominal tenderness.   Skin:     General: Skin is warm.      Capillary Refill: Capillary refill takes less than 2 seconds.   Neurological:      Mental Status: He is alert.      Comments: Alert, oriented, but poor insight into his medical conditions.  Mild diffuse weakness, participates with my interview and exam   Psychiatric:      Comments: Denies SI/HI as well as AH/VH.             Significant Labs: All pertinent labs within the past 24 hours have been reviewed.    Significant Imaging: I have  reviewed all pertinent imaging results/findings within the past 24 hours.

## 2023-08-16 NOTE — PLAN OF CARE
Problem: Adult Inpatient Plan of Care  Goal: Plan of Care Review  Outcome: Ongoing, Progressing  Goal: Patient-Specific Goal (Individualized)  Outcome: Ongoing, Progressing  Goal: Absence of Hospital-Acquired Illness or Injury  Outcome: Ongoing, Progressing  Goal: Optimal Comfort and Wellbeing  Outcome: Ongoing, Progressing  Goal: Readiness for Transition of Care  Outcome: Ongoing, Progressing     Problem: Adjustment to Illness (Stroke, Ischemic/Transient Ischemic Attack)  Goal: Optimal Coping  Outcome: Ongoing, Progressing     Problem: Bowel Elimination Impaired (Stroke, Ischemic/Transient Ischemic Attack)  Goal: Effective Bowel Elimination  Outcome: Ongoing, Progressing     Problem: Cerebral Tissue Perfusion (Stroke, Ischemic/Transient Ischemic Attack)  Goal: Optimal Cerebral Tissue Perfusion  Outcome: Ongoing, Progressing     Problem: Cognitive Impairment (Stroke, Ischemic/Transient Ischemic Attack)  Goal: Optimal Cognitive Function  Outcome: Ongoing, Progressing     Problem: Communication Impairment (Stroke, Ischemic/Transient Ischemic Attack)  Goal: Improved Communication Skills  Outcome: Ongoing, Progressing     Problem: Functional Ability Impaired (Stroke, Ischemic/Transient Ischemic Attack)  Goal: Optimal Functional Ability  Outcome: Ongoing, Progressing     Problem: Respiratory Compromise (Stroke, Ischemic/Transient Ischemic Attack)  Goal: Effective Oxygenation and Ventilation  Outcome: Ongoing, Progressing     Problem: Sensorimotor Impairment (Stroke, Ischemic/Transient Ischemic Attack)  Goal: Improved Sensorimotor Function  Outcome: Ongoing, Progressing     Problem: Swallowing Impairment (Stroke, Ischemic/Transient Ischemic Attack)  Goal: Optimal Eating and Swallowing without Aspiration  Outcome: Ongoing, Progressing     Problem: Urinary Elimination Impaired (Stroke, Ischemic/Transient Ischemic Attack)  Goal: Effective Urinary Elimination  Outcome: Ongoing, Progressing     Problem: Fall Injury  Risk  Goal: Absence of Fall and Fall-Related Injury  Outcome: Ongoing, Progressing     Problem: Infection  Goal: Absence of Infection Signs and Symptoms  Outcome: Ongoing, Progressing     Problem: Diabetes Comorbidity  Goal: Blood Glucose Level Within Targeted Range  Outcome: Ongoing, Progressing

## 2023-08-16 NOTE — ASSESSMENT & PLAN NOTE
"Patient's FSGs are uncontrolled due to hyperglycemia on current medication regimen.  Last A1c reviewed-   Lab Results   Component Value Date    HGBA1C 10.4 (H) 01/27/2023     Most recent fingerstick glucose reviewed- No results for input(s): "POCTGLUCOSE" in the last 24 hours.  Current correctional scale  Low  Maintain anti-hyperglycemic dose as follows-   Antihyperglycemics (From admission, onward)    Start     Stop Route Frequency Ordered    08/16/23 0900  insulin detemir U-100 (Levemir) pen 10 Units         -- SubQ Daily 08/16/23 0025    08/16/23 0125  insulin aspart U-100 pen 0-5 Units         -- SubQ Before meals & nightly PRN 08/16/23 0025        "

## 2023-08-16 NOTE — CONSULTS
TELE-NEUROLOGY CONSULT       Patient Location: W323/W323 A     START TIME: 11:25  END TIME: 11:50        Name: Cali Mabry  MRN: [id number]   [facility]: [id number]      Date: 08/16/2023    Chief Complaint: Vision changes, stroke    History of Present Illness (HPI): Patient is a 57 yr old male with DM (uncontrolled), substance abuse who presents as a tele-neurology consult due to concern for stroke. CT brain shows hypodensity in the R PCA territory with cutoff. Patient endorses R facial numbness and peripheral vision abnormality on both sides. No other focal deficits. MRI brain has not been performed.       ROS: as above    Past Medical History: The patient  has a past medical history of Acute on chronic pancreatitis (04/03/2016), Addiction to drug, CHF (congestive heart failure), DKA (diabetic ketoacidoses) (02/2020), DM (diabetes mellitus), type 1, HCV (hepatitis C virus), HTN (hypertension), psychiatric care, Hypomagnesemia (05/25/2020), IVDU (intravenous drug user), Pancreatitis, Psychiatric problem, and Substance abuse.    Social History: The patient  reports that he has been smoking cigarettes. He started smoking about 42 years ago. He has a 39.2 pack-year smoking history. He has never used smokeless tobacco. He reports that he does not currently use alcohol. He reports current drug use. Drugs: Heroin, IV, Marijuana, and Amphetamines.    Family History: Their family history includes Asthma in his mother.    Allergies: Patient has no known allergies.     Meds: Scheduled Meds:   aspirin  81 mg Oral Daily    atorvastatin  20 mg Oral Daily    clopidogreL  75 mg Oral Daily    insulin aspart U-100  1-10 Units Subcutaneous Q4H    [START ON 8/17/2023] insulin detemir U-100 (Levemir)  20 Units Subcutaneous Daily    mupirocin   Nasal BID    polyethylene glycol  17 g Oral Daily     Continuous Infusions:   sodium chloride 0.9% Stopped (08/16/23 1129)    sodium chloride 0.9%       PRN Meds:.acetaminophen, acetaminophen,  "albuterol-ipratropium, aluminum-magnesium hydroxide-simethicone, bisacodyL, dextrose 50%, dextrose 50%, glucagon (human recombinant), glucagon (human recombinant), glucose, glucose, glucose, glucose, labetaloL, magnesium oxide, magnesium oxide, melatonin, naloxone, OLANZapine, ondansetron, potassium bicarbonate, potassium bicarbonate, potassium bicarbonate, potassium, sodium phosphates, potassium, sodium phosphates, potassium, sodium phosphates, prochlorperazine, simethicone, sodium chloride 0.9%, sodium chloride 0.9%, sodium chloride 0.9%    Exam:  /80   Pulse 92   Temp 97.7 °F (36.5 °C)   Resp 18   Ht 5' 8" (1.727 m)   Wt 68 kg (150 lb)   SpO2 95%   BMI 22.81 kg/m²       Constitutional  Well-developed, well-nourished, appears stated age   Eyes  No scleral icterus       Cardiovascular  No lower extremity edema    Respiratory  No labored breathing    Skin  No rash    Hematologic  No bruising   Psychiatric  No depression   Other  GI/ deferred    Neurological     * Mental status  Alert and oriented to person, place, time, and situation; no dysarthria; no aphasia; normal recent and remote memory; follows commands   * Cranial nerves   EOMI, no facial weakness        Reports peripheral vision disturbance bilaterally                            * Motor  Lifts all 4 ext up equally, no drift        * Coordination  No dysmetria or tremor with outstretched hands    * Gait  Deferred          Laboratory/Radiological:   Admission on 08/15/2023   Component Date Value Ref Range Status    WBC 08/15/2023 6.21  3.90 - 12.70 K/uL Final    RBC 08/15/2023 4.95  4.60 - 6.20 M/uL Final    Hemoglobin 08/15/2023 12.8 (L)  14.0 - 18.0 g/dL Final    Hematocrit 08/15/2023 40.8  40.0 - 54.0 % Final    MCV 08/15/2023 82  82 - 98 fL Final    MCH 08/15/2023 25.9 (L)  27.0 - 31.0 pg Final    MCHC 08/15/2023 31.4 (L)  32.0 - 36.0 g/dL Final    RDW 08/15/2023 16.7 (H)  11.5 - 14.5 % Final    Platelets 08/15/2023 443  150 - 450 K/uL " Final    MPV 08/15/2023 8.8 (L)  9.2 - 12.9 fL Final    Immature Granulocytes 08/15/2023 0.6 (H)  0.0 - 0.5 % Final    Gran # (ANC) 08/15/2023 3.5  1.8 - 7.7 K/uL Final    Immature Grans (Abs) 08/15/2023 0.04  0.00 - 0.04 K/uL Final    Lymph # 08/15/2023 1.8  1.0 - 4.8 K/uL Final    Mono # 08/15/2023 0.7  0.3 - 1.0 K/uL Final    Eos # 08/15/2023 0.1  0.0 - 0.5 K/uL Final    Baso # 08/15/2023 0.01  0.00 - 0.20 K/uL Final    nRBC 08/15/2023 0  0 /100 WBC Final    Gran % 08/15/2023 56.2  38.0 - 73.0 % Final    Lymph % 08/15/2023 28.8  18.0 - 48.0 % Final    Mono % 08/15/2023 11.9  4.0 - 15.0 % Final    Eosinophil % 08/15/2023 2.3  0.0 - 8.0 % Final    Basophil % 08/15/2023 0.2  0.0 - 1.9 % Final    Differential Method 08/15/2023 Automated   Final    Sodium 08/15/2023 139  136 - 145 mmol/L Final    Potassium 08/15/2023 4.3  3.5 - 5.1 mmol/L Final    Chloride 08/15/2023 100  95 - 110 mmol/L Final    CO2 08/15/2023 26  23 - 29 mmol/L Final    Glucose 08/15/2023 140 (H)  70 - 110 mg/dL Final    BUN 08/15/2023 20  6 - 20 mg/dL Final    Creatinine 08/15/2023 1.1  0.5 - 1.4 mg/dL Final    Calcium 08/15/2023 10.0  8.7 - 10.5 mg/dL Final    Total Protein 08/15/2023 9.0 (H)  6.0 - 8.4 g/dL Final    Albumin 08/15/2023 3.5  3.5 - 5.2 g/dL Final    Total Bilirubin 08/15/2023 0.2  0.1 - 1.0 mg/dL Final    Alkaline Phosphatase 08/15/2023 152 (H)  55 - 135 U/L Final    AST 08/15/2023 24  10 - 40 U/L Final    ALT 08/15/2023 24  10 - 44 U/L Final    eGFR 08/15/2023 >60  >60 mL/min/1.73 m^2 Final    Anion Gap 08/15/2023 13  8 - 16 mmol/L Final    Prothrombin Time 08/15/2023 9.6  9.0 - 12.5 sec Final    INR 08/15/2023 0.9  0.8 - 1.2 Final    TSH 08/15/2023 0.176 (L)  0.400 - 4.000 uIU/mL Final    Cholesterol 08/15/2023 297 (H)  120 - 199 mg/dL Final    Triglycerides 08/15/2023 691 (H)  30 - 150 mg/dL Final    HDL 08/15/2023 45  40 - 75 mg/dL Final    LDL Cholesterol 08/15/2023 Invalid, Trig>400.0  63.0 - 159.0 mg/dL Final     HDL/Cholesterol Ratio 08/15/2023 15.2 (L)  20.0 - 50.0 % Final    Total Cholesterol/HDL Ratio 08/15/2023 6.6 (H)  2.0 - 5.0 Final    Non-HDL Cholesterol 08/15/2023 252  mg/dL Final    Specimen UA 08/15/2023 Urine, Clean Catch   Final    Color, UA 08/15/2023 Yellow  Yellow, Straw, Monet Final    Appearance, UA 08/15/2023 Clear  Clear Final    pH, UA 08/15/2023 6.0  5.0 - 8.0 Final    Specific Gravity, UA 08/15/2023 >1.030 (A)  1.005 - 1.030 Final    Protein, UA 08/15/2023 2+ (A)  Negative Final    Glucose, UA 08/15/2023 3+ (A)  Negative Final    Ketones, UA 08/15/2023 Negative  Negative Final    Bilirubin (UA) 08/15/2023 Negative  Negative Final    Occult Blood UA 08/15/2023 Negative  Negative Final    Nitrite, UA 08/15/2023 Negative  Negative Final    Urobilinogen, UA 08/15/2023 Negative  <2.0 EU/dL Final    Leukocytes, UA 08/15/2023 Negative  Negative Final    Benzodiazepines 08/15/2023 Presumptive Positive (A)  Negative Final    Methadone metabolites 08/15/2023 Negative  Negative Final    Cocaine (Metab.) 08/15/2023 Negative  Negative Final    Opiate Scrn, Ur 08/15/2023 Negative  Negative Final    Barbiturate Screen, Ur 08/15/2023 Negative  Negative Final    Amphetamine Screen, Ur 08/15/2023 Presumptive Positive (A)  Negative Final    THC 08/15/2023 Negative  Negative Final    Phencyclidine 08/15/2023 Negative  Negative Final    Creatinine, Urine 08/15/2023 96.1  23.0 - 375.0 mg/dL Final    Toxicology Information 08/15/2023 SEE COMMENT   Final    Acetaminophen (Tylenol), Serum 08/15/2023 <3.0 (L)  10.0 - 20.0 ug/mL Final    Alcohol, Serum 08/15/2023 <10  <10 mg/dL Final    Free T4 08/15/2023 0.86  0.71 - 1.51 ng/dL Final    RBC, UA 08/15/2023 0  0 - 4 /hpf Final    WBC, UA 08/15/2023 0  0 - 5 /hpf Final    Bacteria 08/15/2023 None  None-Occ /hpf Final    Yeast, UA 08/15/2023 None  None Final    Hyaline Casts, UA 08/15/2023 0  0-1/lpf /lpf Final    Microscopic Comment 08/15/2023 SEE COMMENT   Final    BSA  08/16/2023 1.81  m2 In process    LVOT stroke volume 08/16/2023 60.74  cm3 In process    LVIDd 08/16/2023 4.15  3.5 - 6.0 cm In process    LV Systolic Volume 08/16/2023 35.38  mL In process    LV Systolic Volume Index 08/16/2023 19.5  mL/m2 In process    LVIDs 08/16/2023 3.01  2.1 - 4.0 cm In process    LV Diastolic Volume 08/16/2023 76.45  mL In process    LV Diastolic Volume Index 08/16/2023 42.24  mL/m2 In process    IVS 08/16/2023 0.98  0.6 - 1.1 cm In process    LVOT diameter 08/16/2023 2.22  cm In process    LVOT area 08/16/2023 3.9  cm2 In process    FS 08/16/2023 27 (A)  28 - 44 % In process    Left Ventricle Relative Wall Thick* 08/16/2023 0.48  cm In process    Posterior Wall 08/16/2023 1.00  0.6 - 1.1 cm In process    LV mass 08/16/2023 132.78  g In process    LV Mass Index 08/16/2023 73  g/m2 In process    MV Peak E Brodie 08/16/2023 0.43  m/s In process    TDI LATERAL 08/16/2023 0.08  m/s In process    TDI SEPTAL 08/16/2023 0.06  m/s In process    E/E' ratio 08/16/2023 6.14  m/s In process    MV Peak A Brodie 08/16/2023 0.70  m/s In process    TR Max Brodie 08/16/2023 1.03  m/s In process    E/A ratio 08/16/2023 0.61   In process    IVRT 08/16/2023 53.28  msec In process    E wave deceleration time 08/16/2023 112.16  msec In process    LV SEPTAL E/E' RATIO 08/16/2023 7.17  m/s In process    LV LATERAL E/E' RATIO 08/16/2023 5.38  m/s In process    LVOT peak brodie 08/16/2023 0.76  m/s In process    Left Ventricular Outflow Tract Sera* 08/16/2023 0.52  cm/s In process    Left Ventricular Outflow Tract Sera* 08/16/2023 1.25  mmHg In process    LA size 08/16/2023 2.86  cm In process    RVDD 08/16/2023 3.00  cm In process    TAPSE 08/16/2023 1.21  cm In process    RA Major Axis 08/16/2023 3.33  cm In process    RA Width 08/16/2023 2.91  cm In process    AV mean gradient 08/16/2023 3  mmHg In process    AV peak gradient 08/16/2023 4  mmHg In process    Ao peak brodie 08/16/2023 0.98  m/s In process    Ao VTI 08/16/2023 19.00   cm In process    LVOT peak VTI 08/16/2023 15.70  cm In process    AV valve area 08/16/2023 3.20  cm² In process    AV Velocity Ratio 08/16/2023 0.78   In process    AV index (prosthetic) 08/16/2023 0.83   In process    LISSET by Velocity Ratio 08/16/2023 3.00  cm² In process    MV stenosis pressure 1/2 time 08/16/2023 32.53  ms In process    MV valve area p 1/2 method 08/16/2023 6.76  cm2 In process    TV peak gradient 08/16/2023 1  mmHg In process    Triscuspid Valve Regurgitation Pea* 08/16/2023 4  mmHg In process    PV PEAK VELOCITY 08/16/2023 0.88  m/s In process    PV peak gradient 08/16/2023 3  mmHg In process    Sinus 08/16/2023 2.98  cm In process    STJ 08/16/2023 2.30  cm In process    IVC diameter 08/16/2023 1.32  cm In process    Mean e' 08/16/2023 0.07  m/s In process    ZLVIDS 08/16/2023 -0.22   In process    ZLVIDD 08/16/2023 -1.88   In process    Left Atrium Major Axis 08/16/2023 3.6  cm In process    POCT Glucose 08/16/2023 >500 (HH)  70 - 110 mg/dL Final    WBC 08/16/2023 5.30  3.90 - 12.70 K/uL Final    RBC 08/16/2023 4.41 (L)  4.60 - 6.20 M/uL Final    Hemoglobin 08/16/2023 11.4 (L)  14.0 - 18.0 g/dL Final    Hematocrit 08/16/2023 35.0 (L)  40.0 - 54.0 % Final    MCV 08/16/2023 79 (L)  82 - 98 fL Final    MCH 08/16/2023 25.9 (L)  27.0 - 31.0 pg Final    MCHC 08/16/2023 32.6  32.0 - 36.0 g/dL Final    RDW 08/16/2023 16.6 (H)  11.5 - 14.5 % Final    Platelets 08/16/2023 341  150 - 450 K/uL Final    MPV 08/16/2023 9.5  9.2 - 12.9 fL Final    Immature Granulocytes 08/16/2023 0.8 (H)  0.0 - 0.5 % Final    Gran # (ANC) 08/16/2023 3.2  1.8 - 7.7 K/uL Final    Immature Grans (Abs) 08/16/2023 0.04  0.00 - 0.04 K/uL Final    Lymph # 08/16/2023 1.3  1.0 - 4.8 K/uL Final    Mono # 08/16/2023 0.7  0.3 - 1.0 K/uL Final    Eos # 08/16/2023 0.1  0.0 - 0.5 K/uL Final    Baso # 08/16/2023 0.01  0.00 - 0.20 K/uL Final    nRBC 08/16/2023 0  0 /100 WBC Final    Gran % 08/16/2023 60.3  38.0 - 73.0 % Final    Lymph %  08/16/2023 24.5  18.0 - 48.0 % Final    Mono % 08/16/2023 12.5  4.0 - 15.0 % Final    Eosinophil % 08/16/2023 1.7  0.0 - 8.0 % Final    Basophil % 08/16/2023 0.2  0.0 - 1.9 % Final    Differential Method 08/16/2023 Automated   Final    POCT Glucose 08/16/2023 >500 (HH)  70 - 110 mg/dL Final    Sodium 08/16/2023 129 (L)  136 - 145 mmol/L Final    Potassium 08/16/2023 5.2 (H)  3.5 - 5.1 mmol/L Final    Chloride 08/16/2023 95  95 - 110 mmol/L Final    CO2 08/16/2023 24  23 - 29 mmol/L Final    Glucose 08/16/2023 631 (HH)  70 - 110 mg/dL Final    BUN 08/16/2023 24 (H)  6 - 20 mg/dL Final    Creatinine 08/16/2023 1.2  0.5 - 1.4 mg/dL Final    Calcium 08/16/2023 8.8  8.7 - 10.5 mg/dL Final    Total Protein 08/16/2023 7.2  6.0 - 8.4 g/dL Final    Albumin 08/16/2023 3.0 (L)  3.5 - 5.2 g/dL Final    Total Bilirubin 08/16/2023 0.4  0.1 - 1.0 mg/dL Final    Alkaline Phosphatase 08/16/2023 131  55 - 135 U/L Final    AST 08/16/2023 19  10 - 40 U/L Final    ALT 08/16/2023 21  10 - 44 U/L Final    eGFR 08/16/2023 >60  >60 mL/min/1.73 m^2 Final    Anion Gap 08/16/2023 10  8 - 16 mmol/L Final    Magnesium 08/16/2023 1.9  1.6 - 2.6 mg/dL Final    Beta-Hydroxybutyrate 08/16/2023 2.3 (H)  0.0 - 0.5 mmol/L Final    Sodium 08/16/2023 129 (L)  136 - 145 mmol/L Final    Potassium 08/16/2023 5.1  3.5 - 5.1 mmol/L Final    Chloride 08/16/2023 95  95 - 110 mmol/L Final    CO2 08/16/2023 23  23 - 29 mmol/L Final    Glucose 08/16/2023 632 (HH)  70 - 110 mg/dL Final    BUN 08/16/2023 26 (H)  6 - 20 mg/dL Final    Creatinine 08/16/2023 1.3  0.5 - 1.4 mg/dL Final    Calcium 08/16/2023 9.0  8.7 - 10.5 mg/dL Final    Anion Gap 08/16/2023 11  8 - 16 mmol/L Final    eGFR 08/16/2023 >60  >60 mL/min/1.73 m^2 Final    Magnesium 08/16/2023 2.0  1.6 - 2.6 mg/dL Final    Phosphorus 08/16/2023 3.7  2.7 - 4.5 mg/dL Final    POCT Glucose 08/16/2023 >500 (HH)  70 - 110 mg/dL Final    POCT Glucose 08/16/2023 >500 (HH)  70 - 110 mg/dL Final    POCT Glucose  08/16/2023 362 (H)  70 - 110 mg/dL Final   Admission on 08/08/2023, Discharged on 08/08/2023   Component Date Value Ref Range Status    WBC 08/08/2023 8.03  3.90 - 12.70 K/uL Final    RBC 08/08/2023 4.44 (L)  4.60 - 6.20 M/uL Final    Hemoglobin 08/08/2023 11.3 (L)  14.0 - 18.0 g/dL Final    Hematocrit 08/08/2023 35.9 (L)  40.0 - 54.0 % Final    MCV 08/08/2023 81 (L)  82 - 98 fL Final    MCH 08/08/2023 25.5 (L)  27.0 - 31.0 pg Final    MCHC 08/08/2023 31.5 (L)  32.0 - 36.0 g/dL Final    RDW 08/08/2023 16.3 (H)  11.5 - 14.5 % Final    Platelets 08/08/2023 450  150 - 450 K/uL Final    MPV 08/08/2023 9.2  9.2 - 12.9 fL Final    Immature Granulocytes 08/08/2023 0.5  0.0 - 0.5 % Final    Gran # (ANC) 08/08/2023 5.5  1.8 - 7.7 K/uL Final    Immature Grans (Abs) 08/08/2023 0.04  0.00 - 0.04 K/uL Final    Lymph # 08/08/2023 1.4  1.0 - 4.8 K/uL Final    Mono # 08/08/2023 1.0  0.3 - 1.0 K/uL Final    Eos # 08/08/2023 0.1  0.0 - 0.5 K/uL Final    Baso # 08/08/2023 0.03  0.00 - 0.20 K/uL Final    nRBC 08/08/2023 0  0 /100 WBC Final    Gran % 08/08/2023 68.0  38.0 - 73.0 % Final    Lymph % 08/08/2023 17.6 (L)  18.0 - 48.0 % Final    Mono % 08/08/2023 12.3  4.0 - 15.0 % Final    Eosinophil % 08/08/2023 1.2  0.0 - 8.0 % Final    Basophil % 08/08/2023 0.4  0.0 - 1.9 % Final    Differential Method 08/08/2023 Automated   Final    Sodium 08/08/2023 132 (L)  136 - 145 mmol/L Final    Potassium 08/08/2023 4.9  3.5 - 5.1 mmol/L Final    Chloride 08/08/2023 97  95 - 110 mmol/L Final    CO2 08/08/2023 21 (L)  23 - 29 mmol/L Final    Glucose 08/08/2023 438 (H)  70 - 110 mg/dL Final    BUN 08/08/2023 22 (H)  6 - 20 mg/dL Final    Creatinine 08/08/2023 1.3  0.5 - 1.4 mg/dL Final    Calcium 08/08/2023 9.5  8.7 - 10.5 mg/dL Final    Total Protein 08/08/2023 7.7  6.0 - 8.4 g/dL Final    Albumin 08/08/2023 3.2 (L)  3.5 - 5.2 g/dL Final    Total Bilirubin 08/08/2023 0.3  0.1 - 1.0 mg/dL Final    Alkaline Phosphatase 08/08/2023 125  55 - 135 U/L  Final    AST 08/08/2023 18  10 - 40 U/L Final    ALT 08/08/2023 25  10 - 44 U/L Final    eGFR 08/08/2023 >60  >60 mL/min/1.73 m^2 Final    Anion Gap 08/08/2023 14  8 - 16 mmol/L Final    TSH 08/08/2023 0.100 (L)  0.400 - 4.000 uIU/mL Final    Cholesterol 08/08/2023 218 (H)  120 - 199 mg/dL Final    Triglycerides 08/08/2023 299 (H)  30 - 150 mg/dL Final    HDL 08/08/2023 48  40 - 75 mg/dL Final    LDL Cholesterol 08/08/2023 110.2  63.0 - 159.0 mg/dL Final    HDL/Cholesterol Ratio 08/08/2023 22.0  20.0 - 50.0 % Final    Total Cholesterol/HDL Ratio 08/08/2023 4.5  2.0 - 5.0 Final    Non-HDL Cholesterol 08/08/2023 170  mg/dL Final    POC PH 08/08/2023 7.386  7.35 - 7.45 Final    POC PCO2 08/08/2023 46.2 (H)  35 - 45 mmHg Final    POC PO2 08/08/2023 31 (L)  40 - 60 mmHg Final    POC HCO3 08/08/2023 27.7  24 - 28 mmol/L Final    POC BE 08/08/2023 2  -2 to 2 mmol/L Final    POC SATURATED O2 08/08/2023 59 (L)  95 - 100 % Final    POC TCO2 08/08/2023 29  24 - 29 mmol/L Final    Sample 08/08/2023 VENOUS   Final    Site 08/08/2023 Other   Final    Allens Test 08/08/2023 N/A   Final    DelSys 08/08/2023 Room Air   Final    Free T4 08/08/2023 0.97  0.71 - 1.51 ng/dL Final   Admission on 08/02/2023, Discharged on 08/02/2023   Component Date Value Ref Range Status    POCT Glucose 08/02/2023 142 (H)  70 - 110 mg/dL Final   Admission on 07/22/2023, Discharged on 07/23/2023   Component Date Value Ref Range Status    POCT Glucose 07/22/2023 <20 (L)  70 - 110 mg/dL Final    POCT Glucose 07/22/2023 154 (H)  70 - 110 mg/dL Final    BNP 07/22/2023 41  0 - 99 pg/mL Final    WBC 07/22/2023 8.18  3.90 - 12.70 K/uL Final    RBC 07/22/2023 4.34 (L)  4.60 - 6.20 M/uL Final    Hemoglobin 07/22/2023 10.9 (L)  14.0 - 18.0 g/dL Final    Hematocrit 07/22/2023 35.0 (L)  40.0 - 54.0 % Final    MCV 07/22/2023 81 (L)  82 - 98 fL Final    MCH 07/22/2023 25.1 (L)  27.0 - 31.0 pg Final    MCHC 07/22/2023 31.1 (L)  32.0 - 36.0 g/dL Final    RDW 07/22/2023  15.3 (H)  11.5 - 14.5 % Final    Platelets 07/22/2023 344  150 - 450 K/uL Final    MPV 07/22/2023 9.3  9.2 - 12.9 fL Final    Immature Granulocytes 07/22/2023 0.5  0.0 - 0.5 % Final    Gran # (ANC) 07/22/2023 5.9  1.8 - 7.7 K/uL Final    Immature Grans (Abs) 07/22/2023 0.04  0.00 - 0.04 K/uL Final    Lymph # 07/22/2023 1.2  1.0 - 4.8 K/uL Final    Mono # 07/22/2023 0.7  0.3 - 1.0 K/uL Final    Eos # 07/22/2023 0.3  0.0 - 0.5 K/uL Final    Baso # 07/22/2023 0.02  0.00 - 0.20 K/uL Final    nRBC 07/22/2023 0  0 /100 WBC Final    Gran % 07/22/2023 72.2  38.0 - 73.0 % Final    Lymph % 07/22/2023 14.4 (L)  18.0 - 48.0 % Final    Mono % 07/22/2023 8.8  4.0 - 15.0 % Final    Eosinophil % 07/22/2023 3.9  0.0 - 8.0 % Final    Basophil % 07/22/2023 0.2  0.0 - 1.9 % Final    Differential Method 07/22/2023 Automated   Final    Sodium 07/22/2023 134 (L)  136 - 145 mmol/L Final    Potassium 07/22/2023 3.6  3.5 - 5.1 mmol/L Final    Chloride 07/22/2023 99  95 - 110 mmol/L Final    CO2 07/22/2023 26  23 - 29 mmol/L Final    Glucose 07/22/2023 142 (H)  70 - 110 mg/dL Final    BUN 07/22/2023 18  6 - 20 mg/dL Final    Creatinine 07/22/2023 1.0  0.5 - 1.4 mg/dL Final    Calcium 07/22/2023 9.0  8.7 - 10.5 mg/dL Final    Total Protein 07/22/2023 6.9  6.0 - 8.4 g/dL Final    Albumin 07/22/2023 3.4 (L)  3.5 - 5.2 g/dL Final    Total Bilirubin 07/22/2023 0.3  0.1 - 1.0 mg/dL Final    Alkaline Phosphatase 07/22/2023 92  55 - 135 U/L Final    AST 07/22/2023 17  10 - 40 U/L Final    ALT 07/22/2023 18  10 - 44 U/L Final    eGFR 07/22/2023 >60  >60 mL/min/1.73 m^2 Final    Anion Gap 07/22/2023 9  8 - 16 mmol/L Final    Lipase 07/22/2023 114 (H)  4 - 60 U/L Final    Magnesium 07/22/2023 2.0  1.6 - 2.6 mg/dL Final    Troponin I 07/22/2023 0.014  0.000 - 0.026 ng/mL Final    TSH 07/22/2023 0.664  0.400 - 4.000 uIU/mL Final    Lactate (Lactic Acid) 07/22/2023 1.6  0.5 - 2.2 mmol/L Final    Blood Culture, Routine 07/22/2023 No Growth after 4 days.    Final    Blood Culture, Routine 07/22/2023 No Growth after 4 days.   Final    Alcohol, Serum 07/22/2023 <10  <10 mg/dL Final    Phosphorus 07/22/2023 3.2  2.7 - 4.5 mg/dL Final    POCT Glucose 07/22/2023 203 (H)  70 - 110 mg/dL Final    POCT Glucose 07/23/2023 297 (H)  70 - 110 mg/dL Final   Admission on 06/30/2023, Discharged on 07/02/2023   Component Date Value Ref Range Status    WBC 06/30/2023 11.97  3.90 - 12.70 K/uL Final    RBC 06/30/2023 5.93  4.60 - 6.20 M/uL Final    Hemoglobin 06/30/2023 14.9  14.0 - 18.0 g/dL Final    Hematocrit 06/30/2023 48.9  40.0 - 54.0 % Final    MCV 06/30/2023 83  82 - 98 fL Final    MCH 06/30/2023 25.1 (L)  27.0 - 31.0 pg Final    MCHC 06/30/2023 30.5 (L)  32.0 - 36.0 g/dL Final    RDW 06/30/2023 13.8  11.5 - 14.5 % Final    Platelets 06/30/2023 427  150 - 450 K/uL Final    MPV 06/30/2023 9.7  9.2 - 12.9 fL Final    Immature Granulocytes 06/30/2023 0.3  0.0 - 0.5 % Final    Gran # (ANC) 06/30/2023 7.4  1.8 - 7.7 K/uL Final    Immature Grans (Abs) 06/30/2023 0.03  0.00 - 0.04 K/uL Final    Lymph # 06/30/2023 2.8  1.0 - 4.8 K/uL Final    Mono # 06/30/2023 1.0  0.3 - 1.0 K/uL Final    Eos # 06/30/2023 0.7 (H)  0.0 - 0.5 K/uL Final    Baso # 06/30/2023 0.04  0.00 - 0.20 K/uL Final    nRBC 06/30/2023 0  0 /100 WBC Final    Gran % 06/30/2023 61.8  38.0 - 73.0 % Final    Lymph % 06/30/2023 23.4  18.0 - 48.0 % Final    Mono % 06/30/2023 8.2  4.0 - 15.0 % Final    Eosinophil % 06/30/2023 6.0  0.0 - 8.0 % Final    Basophil % 06/30/2023 0.3  0.0 - 1.9 % Final    Differential Method 06/30/2023 Automated   Final    Sodium 06/30/2023 134 (L)  136 - 145 mmol/L Final    Potassium 06/30/2023 5.0  3.5 - 5.1 mmol/L Final    Chloride 06/30/2023 100  95 - 110 mmol/L Final    CO2 06/30/2023 22 (L)  23 - 29 mmol/L Final    Glucose 06/30/2023 253 (H)  70 - 110 mg/dL Final    BUN 06/30/2023 17  6 - 20 mg/dL Final    Creatinine 06/30/2023 1.2  0.5 - 1.4 mg/dL Final    Calcium 06/30/2023 9.4  8.7 - 10.5  mg/dL Final    Total Protein 06/30/2023 8.9 (H)  6.0 - 8.4 g/dL Final    Albumin 06/30/2023 4.2  3.5 - 5.2 g/dL Final    Total Bilirubin 06/30/2023 0.4  0.1 - 1.0 mg/dL Final    Alkaline Phosphatase 06/30/2023 122  55 - 135 U/L Final    AST 06/30/2023 21  10 - 40 U/L Final    ALT 06/30/2023 19  10 - 44 U/L Final    eGFR 06/30/2023 >60  >60 mL/min/1.73 m^2 Final    Anion Gap 06/30/2023 12  8 - 16 mmol/L Final    Troponin I 06/30/2023 0.015  0.000 - 0.026 ng/mL Final    BNP 06/30/2023 93  0 - 99 pg/mL Final    D-Dimer 06/30/2023 0.36  <0.50 mg/L FEU Final    Blood Culture, Routine 06/30/2023 No Growth after 4 days.   Final    Blood Culture, Routine 06/30/2023 No Growth after 4 days.   Final    Lactate (Lactic Acid) 06/30/2023 1.4  0.5 - 2.2 mmol/L Final    POC PH 06/30/2023 7.185 (L)  7.35 - 7.45 Final    POC PCO2 06/30/2023 82.2 (H)  35 - 45 mmHg Final    POC PO2 06/30/2023 48  40 - 60 mmHg Final    POC HCO3 06/30/2023 31.0 (H)  24 - 28 mmol/L Final    POC BE 06/30/2023 0  -2 to 2 mmol/L Final    POC SATURATED O2 06/30/2023 71 (L)  95 - 100 % Final    POC TCO2 06/30/2023 34 (H)  24 - 29 mmol/L Final    Sample 06/30/2023 VENOUS   Final    Site 06/30/2023 Other   Final    Allens Test 06/30/2023 N/A   Final    DelSys 06/30/2023 Nasal Can   Final    Troponin I 06/30/2023 0.085 (H)  0.000 - 0.026 ng/mL Final    POCT Glucose 06/30/2023 241 (H)  70 - 110 mg/dL Final    POCT Glucose 06/30/2023 372 (H)  70 - 110 mg/dL Final    POCT Glucose 07/01/2023 367 (H)  70 - 110 mg/dL Final    POCT Glucose 07/01/2023 375 (H)  70 - 110 mg/dL Final    POC PH 07/01/2023 7.387  7.35 - 7.45 Final    POC PCO2 07/01/2023 43.7  35 - 45 mmHg Final    POC PO2 07/01/2023 80  80 - 100 mmHg Final    POC HCO3 07/01/2023 26.3  24 - 28 mmol/L Final    POC BE 07/01/2023 1  -2 to 2 mmol/L Final    POC SATURATED O2 07/01/2023 96  95 - 100 % Final    POC TCO2 07/01/2023 28 (H)  23 - 27 mmol/L Final    Sample 07/01/2023 ARTERIAL   Final    Site 07/01/2023  LR   Final    Allens Test 2023 N/A   Final    POCT Glucose 2023 106  70 - 110 mg/dL Final    POCT Glucose 2023 68 (L)  70 - 110 mg/dL Final    POCT Glucose 2023 61 (L)  70 - 110 mg/dL Final    POCT Glucose 2023 92  70 - 110 mg/dL Final    POCT Glucose 2023 235 (H)  70 - 110 mg/dL Final       Diagnoses:   1) Patient with R PCA occlusion with R occipital hypodensity on CT concerning for stroke. Patient has a history of substance abuse- + for amphetamine and bzd. Patient was started on DAPT and statin. Suspect drug induced arrhythmia vs underlying arrhythmia       Medical Decision Makin) MRI brain to evaluate stroke  2) ECHO pending  3) DAPT x 21 days then monotherapy with plavix 75 mg daily  4) Lipitor 40 mg daily  5) Consider Loop recorder for rhythm monitoring    Thank you for this consult. Please do not hesitate to contact us with questions. Please re-consult neurology if any issues and when MRI is completed.    This is a consult performed through audio-visual using [employer] verenice. Pt and provider are in different locations. History and physical exam are limited due to the nature of this encounter.       Dr. Shiva Griffith  Tele-Neurology- [facility]

## 2023-08-16 NOTE — PT/OT/SLP EVAL
Speech Language Pathology Evaluation  Bedside Swallow    Patient Name:  Cali Mabry   MRN:  [id number]  Admitting Diagnosis: Stroke    Recommendations:                 General Recommendations:  Dysphagia therapy  Diet recommendations:  Regular, with WATER ONLY  Aspiration Precautions: 1 bite/sip at a time   General Precautions: Standard,    Communication strategies:  none    Assessment:     Cali Mabry is a 57 y.o. male with dx CVA he reports language is at baseline, he presents with oropharyngeal dysphagia of a yet to be determined severity. Decreased tolerance of thin liquids at bedside.    History:     Past Medical History:   Diagnosis Date    Acute on chronic pancreatitis 04/03/2016    Addiction to drug     CHF (congestive heart failure)     DKA (diabetic ketoacidoses) 02/2020    DM (diabetes mellitus), type 1     HCV (hepatitis C virus)     high VL     HTN (hypertension)     Hx of psychiatric care     Hypomagnesemia 05/25/2020    IVDU (intravenous drug user)     Heroin    Pancreatitis     Psychiatric problem     Substance abuse        Past Surgical History:   Procedure Laterality Date    CARDIAC SURGERY  1999    stent placed. ([facility])    ESOPHAGOGASTRODUODENOSCOPY N/A 3/5/2020    Gastritis.  No H pylori.  Completed PPI for 1 month.  Procedure: EGD (ESOPHAGOGASTRODUODENOSCOPY);  Surgeon: Brinda Rene MD;  Location: [facility] ENDO;  Service: Endoscopy;  Laterality: N/A;  W421 A; [id number]    LEFT HEART CATHETERIZATION Left 9/28/2020    Procedure: Left heart cath;  Surgeon: Fito Rangel MD;  Location: [facility] CATH LAB;  Service: Cardiology;  Laterality: Left;    SHOULDER SURGERY  2013    right shoulder, spur removal.     Social History: Patient lives with family.    Chest X-Rays: 8/9/23 [facility] [place] [facility]- clear    Prior diet: unrestricted    Subjective   Pt reporting earlier confusion and that he remembers little from that episode, he grossly passed cognitive linguistic screen and participated in  conversation appropriately. Complete cognitive linguistic evaluation is not recommended at this time as Pt is somewhat defensive by questions he feels probe his intellect/ sanity. He reports his language skills are at baseline. He was pleasant, cooperative, and appreciative of care throughout. He reports swallow function is below baseline.   Patient goals: return to medical status baseline    Pain/Comfort:  Pain Rating 1: 0/10    Respiratory Status: Room air    Objective:     Oral Musculature Evaluation  Oral Musculature: WFL  Dentition: scattered dentition  Secretion Management: adequate  Mucosal Quality: good  Mandibular Strength and Mobility: WFL  Oral Labial Strength and Mobility: [facility]  Lingual Strength and Mobility: [facility]  Voice Prior to PO Intake: [facility]  Oral Musculature Comments: [facility]    Bedside Swallow Eval:   Consistencies Assessed:  Thin liquids ~4oz via straw and cup  Solids x1 cracker      Oral Phase:   WFL    Pharyngeal Phase:   coughing/choking- inconsistent thin  no overt clinical signs/symptoms of aspiration- solids  throat clearing- thin  Pt reporting sensation of pharyngeal stasis post swallow for thin and solid    Compensatory Strategies  None    Treatment: please note silent aspiration cannot be r/o at bedside.     Goals:   Multidisciplinary Problems       SLP Goals          Problem: SLP    Goal Priority Disciplines Outcome   SLP Goal    Medium SLP Ongoing, Progressing   Description: Pt will participate in ongoing assessment of swallow                       Plan:     Patient to be seen:  3 x/week, 4 x/week   Plan of Care expires:  08/24/23  Plan of Care reviewed with:  patient   SLP Follow-Up:  Yes       Discharge recommendations:  home health speech therapy   Barriers to Discharge:  None    Time Tracking:     SLP Treatment Date:   08/16/23  Speech Start Time:  1043  Speech Stop Time:  1056     Speech Total Time (min):  13 min    Billable Minutes: Eval Swallow and Oral Function 13    08/16/2023

## 2023-08-16 NOTE — ASSESSMENT & PLAN NOTE
-BP normal to moderately elevated  -Given subacute nature of his stroke - pursuing normotension  -No BP meds on home list  -Given CAD history will add metoprolol.

## 2023-08-16 NOTE — CONSULTS
"[facility]  Psychiatry  Telepsychiatry Consult Note    Please see previous notes:    Patient agreeable to consultation via telepsychiatry.    Tele-Consultation from Psychiatry started: 8/15/2023 at 9:15pm  The chief complaint leading to psychiatric consultation is: Grave Disability  This consultation was requested by Dr Callahan, the Emergency Department attending physician.  The location of the consulting psychiatrist is  [place] .  The patient location is  [facility] EMERGENCY DEPARTMENT   The patient arrived at the ED at: [facility]    Also present with the patient at the time of the consultation: nobody    Patient Identification:   Cali Mabry is a 57 y.o. male.    Patient information was obtained from patient and past medical records.  Patient presented to the Emergency Department    Consults  Teleconsult Time Documentation  Subjective:     History of Present Illness:  56yo male with hx of CVA presented with complaint of not being able to see. Patient was quite volatile with his mood and psychiatry was consulted.   Per ED staff- patient has been 2 four different Eds and signed out AMA. He has been quite angry with them through his stay. He apparently had been threatening staff.  On interview, patient would not participate, he started screaming immediately, "I do not need to see a psychiatrist!! I speak three different languages.. what language do you speak? Don't look at me with that dumb look on your face." Patient then went on to ramble in a grandiose manner.   This is the extent of patients complaints at this time  Unable to obtain ros due to inability to participate.    Psychiatric History:   Unable to obtain due to inability to participate      Substance Abuse History:  Unable to obtain due to inability to participate    Legal History: Past charges/incarcerations: Unable to obtain due to inability to participate    Family Psychiatric History: Unable to obtain due to inability to participate      Social " "History:  Unable to obtain due to inability to participate      Psychiatric Mental Status Exam:  Arousal: alert  Sensorium/Orientation: oriented to place, situation  Behavior/Cooperation: uncooperative   Speech: loud, pressured  Language: not tested  Mood: " fine! "   Affect: irritable and angry  Thought Process: illogical  Thought Content:   Auditory hallucinations: unable to obtain due to inability to participate  Visual hallucinations: unable to obtain due to inability to participate  Paranoia: YES:      Delusions:  unable to obtain due to inability to participate   Suicidal ideation: unable to obtain due to inability to participate  Homicidal ideation: unable to obtain due to inability to participate  Attention/Concentration:  impaired  Memory:    Recent:  unable to obtain due to inability to participate   Remote: unable to obtain due to inability to participate     Fund of Knowledge: Impaired   Abstract reasoning: unable to obtain due to inability to participate  Insight: poor awareness of illness  Judgment:  poor      Past Medical History:   Past Medical History:   Diagnosis Date    Acute on chronic pancreatitis 04/03/2016    Addiction to drug     CHF (congestive heart failure)     DKA (diabetic ketoacidoses) 02/2020    DM (diabetes mellitus), type 1     HCV (hepatitis C virus)     high VL     HTN (hypertension)     Hx of psychiatric care     Hypomagnesemia 05/25/2020    IVDU (intravenous drug user)     Heroin    Pancreatitis     Psychiatric problem     Substance abuse       Laboratory Data:   Labs Reviewed   CBC W/ AUTO DIFFERENTIAL - Abnormal; Notable for the following components:       Result Value    Hemoglobin 12.8 (*)     MCH 25.9 (*)     MCHC 31.4 (*)     RDW 16.7 (*)     MPV 8.8 (*)     Immature Granulocytes 0.6 (*)     All other components within normal limits   COMPREHENSIVE METABOLIC PANEL - Abnormal; Notable for the following components:    Glucose 140 (*)     Total Protein 9.0 (*)     Alkaline " Phosphatase 152 (*)     All other components within normal limits   TSH - Abnormal; Notable for the following components:    TSH 0.176 (*)     All other components within normal limits   LIPID PANEL - Abnormal; Notable for the following components:    Cholesterol 297 (*)     Triglycerides 691 (*)     HDL/Cholesterol Ratio 15.2 (*)     Total Cholesterol/HDL Ratio 6.6 (*)     All other components within normal limits   ACETAMINOPHEN LEVEL - Abnormal; Notable for the following components:    Acetaminophen (Tylenol), Serum <3.0 (*)     All other components within normal limits   PROTIME-INR   ALCOHOL,MEDICAL (ETHANOL)   ACETAMINOPHEN LEVEL   ALCOHOL,MEDICAL (ETHANOL)   T4, FREE   URINALYSIS, REFLEX TO URINE CULTURE   DRUG SCREEN PANEL, URINE EMERGENCY   POCT GLUCOSE MONITORING CONTINUOUS       Allergies:   Review of patient's allergies indicates:  No Known Allergies    Medications in ER:   Medications   iohexoL (OMNIPAQUE 350) injection 80 mL (80 mLs Intravenous Given 8/15/23 2052)       Medications at home: reviewed with patient and in MAR    No new subjective & objective note has been filed under this hospital service since the last note was generated.      Assessment - Diagnosis - Goals:     Diagnosis/Impression:   Ila (provisional)  R/o mood disorder 2/2 [facility] (CVA)    Rec:   Recommend PEC given grave disabled/risk of harm towards others. Inpatient psychiatric tx once medically cleared.  Geodon or Zyprexa PO/IM q 6 hours prn severe agitation   Will defer to inpatient psychiatric team to start/modify scheduled medications    Time with patient, coordinating care: 20min      More than 50% of the time was spent counseling/coordinating care    Consulting clinician was informed of the encounter and consult note.    Consultation ended: 8/15/2023 at 10:00pm  Nicholas Schilling MD  Psychiatry  [facility]

## 2023-08-16 NOTE — ASSESSMENT & PLAN NOTE
Psychiatry consulted.  Patient was PEC'd.  Geodon or Zyprexa PO/IM q 6 hours prn severe agitation

## 2023-08-16 NOTE — SUBJECTIVE & OBJECTIVE
Past Medical History:   Diagnosis Date    Acute on chronic pancreatitis 04/03/2016    Addiction to drug     CHF (congestive heart failure)     DKA (diabetic ketoacidoses) 02/2020    DM (diabetes mellitus), type 1     HCV (hepatitis C virus)     high VL     HTN (hypertension)     Hx of psychiatric care     Hypomagnesemia 05/25/2020    IVDU (intravenous drug user)     Heroin    Pancreatitis     Psychiatric problem     Substance abuse        Past Surgical History:   Procedure Laterality Date    CARDIAC SURGERY  1999    stent placed. ([facility])    ESOPHAGOGASTRODUODENOSCOPY N/A 3/5/2020    Gastritis.  No H pylori.  Completed PPI for 1 month.  Procedure: EGD (ESOPHAGOGASTRODUODENOSCOPY);  Surgeon: Brinda Rene MD;  Location: [facility] ENDO;  Service: Endoscopy;  Laterality: N/A;  W421 A; [phone]    LEFT HEART CATHETERIZATION Left 9/28/2020    Procedure: Left heart cath;  Surgeon: Fito Rangel MD;  Location: [facility] CATH LAB;  Service: Cardiology;  Laterality: Left;    SHOULDER SURGERY  2013    right shoulder, spur removal.       Review of patient's allergies indicates:  No Known Allergies    No current facility-administered medications on file prior to encounter.     Current Outpatient Medications on File Prior to Encounter   Medication Sig    albuterol (PROVENTIL/VENTOLIN HFA) 90 mcg/actuation inhaler Inhale 1-2 puffs into the lungs every 6 (six) hours as needed for Wheezing. Rescue    aspirin 81 MG Chew Take 1 tablet (81 mg total) by mouth once daily.    atorvastatin (LIPITOR) 20 MG tablet Take 1 tablet (20 mg total) by mouth once daily.    cetirizine (ZYRTEC) 10 MG tablet Take 1 tablet (10 mg total) by mouth once daily.    gabapentin (NEURONTIN) 400 MG capsule Take 400 mg by mouth 3 (three) times daily.    insulin detemir U-100, Levemir, 100 unit/mL (3 mL) SubQ InPn pen Inject 20 Units into the skin every evening.    insulin lispro 100 unit/mL pen Inject 7 Units into the skin 3 (three) times daily with meals.     levocetirizine (XYZAL) 5 MG tablet Take 5 mg by mouth every evening.    loratadine (CLARITIN) 10 mg tablet Take 10 mg by mouth.    naloxone (NARCAN) 4 mg/actuation Spry 4mg by nasal route as needed for opioid overdose; may repeat every 2-3 minutes in alternating nostrils until medical help arrives. Call 911    nitroGLYCERIN (NITROSTAT) 0.4 MG SL tablet Place 1 tablet (0.4 mg total) under the tongue every 5 (five) minutes as needed for Chest pain. (Patient not taking: Reported on 9/16/2022)    QUEtiapine (SEROQUEL) 100 MG Tab Take 100 mg by mouth every evening.    sertraline (ZOLOFT) 25 MG tablet Take 25 mg by mouth 2 (two) times daily.    [DISCONTINUED] insulin aspart U-100 (NOVOLOG) 100 unit/mL injection Inject 1-10 Units into the skin 3 (three) times daily with meals.     Family History       Problem Relation (Age of Onset)    Asthma Mother          Tobacco Use    Smoking status: Every Day     Current packs/day: 0.00     Average packs/day: 1 pack/day for 39.2 years (39.2 ttl pk-yrs)     Types: Cigarettes     Start date: 4/3/1981     Last attempt to quit: 6/1/2020     Years since quitting: 3.2    Smokeless tobacco: Never    Tobacco comments:     states approx 1 per day.   Substance and Sexual Activity    Alcohol use: Not Currently     Alcohol/week: 0.0 standard drinks of alcohol    Drug use: Yes     Types: Heroin, IV, Marijuana, Amphetamines     Comment: chronic methamphetamine user    Sexual activity: Yes     Partners: Female     Review of Systems   Unable to perform ROS: Psychiatric disorder     Objective:     Vital Signs (Most Recent):  Temp: 98.4 °F (36.9 °C) (08/15/23 2139)  Pulse: 94 (08/15/23 2139)  Resp: 16 (08/15/23 2139)  BP: (!) 169/80 (08/15/23 2139)  SpO2: 99 % (08/15/23 2139) Vital Signs (24h Range):  Temp:  [98.2 °F (36.8 °C)-98.4 °F (36.9 °C)] 98.4 °F (36.9 °C)  Pulse:  [] 94  Resp:  [16-18] 16  SpO2:  [98 %-99 %] 99 %  BP: (162-169)/(80-88) 169/80     Weight: 68 kg (150 lb)  Body mass  index is 22.81 kg/m².     Physical Exam  Vitals and nursing note reviewed.   Constitutional:       General: He is not in acute distress.     Appearance: Normal appearance. He is not ill-appearing.   HENT:      Head: Normocephalic and atraumatic.      Nose: Nose normal.      Mouth/Throat:      Mouth: Mucous membranes are moist.   Eyes:      Extraocular Movements: Extraocular movements intact.   Cardiovascular:      Rate and Rhythm: Normal rate.      Pulses: Normal pulses.      Heart sounds: No murmur heard.  Pulmonary:      Effort: Pulmonary effort is normal. No respiratory distress.   Abdominal:      General: Abdomen is flat.      Palpations: Abdomen is soft.      Tenderness: There is no abdominal tenderness.   Skin:     General: Skin is warm.      Capillary Refill: Capillary refill takes less than 2 seconds.   Neurological:      Mental Status: He is alert.      Comments: Uncooperative with exam                  Significant Labs: All pertinent labs within the past 24 hours have been reviewed.    Significant Imaging: I have reviewed all pertinent imaging results/findings within the past 24 hours.

## 2023-08-16 NOTE — ED NOTES
Pt reports resolution of vision changes. Pt denies seeing spots and denies blurred vision. Pt has no complaints at this time.

## 2023-08-16 NOTE — NURSING TRANSFER
Nursing Transfer Note      8/16/2023   11:06 AM    Reason patient is being transferred: Stroke    Transfer From: ED    Transfer via stretcher    Transfer with cardiac monitoring    Transported by Security, ED nurse    Medicines sent: insulin    Any special needs or follow-up needed: none    Patient belongings transferred with patient: No    Chart send with patient: Yes    Patient reassessed at: 8/16/23 @ 1100     Upon arrival to floor: cardiac monitor applied, patient oriented to room, call bell in reach, and bed in lowest position

## 2023-08-16 NOTE — PROGRESS NOTES
[facility] Medicine  Progress Note    Patient Name: Cali Mabry  MRN: [id number]  Patient Class: IP- Inpatient   Admission Date: 8/15/2023  Length of Stay: 1 days  Attending Physician: Cullen Jackman MD  Primary Care Provider: Tiff, Primary Doctor        Subjective:     Principal Problem:Stroke        HPI:  This is a 57-year-old male with a past medical history of type 1 diabetes, COPD (not on O2), CAD, hypertension, hyperlipidemia, substance use, mood disorder who presents with blurry vision.    Patient had multiple ED presentations for headaches, blurry vision, eye pain this month.  On 08/02, he presented to [facility] and had a CT head that showed a new left cerebellar infarct, however left against medical advice.  He returned to  ED on 8/7 and to [facility] ED for worsening vision loss, however left AMA.  He had a brief hospitalization to  on 8/9 for DKA after presenting with similar symptoms as well.  Patient is known to be verbally abusive to staff.    In the ED, the patient was hypertensive.  Labs were remarkable for hypercholesterolemia (297).  UDS was positive for amphetamines and benzodiazepines.  CTA head showed Cut off involving a part of the P2 segment of the right PCA,  subacute to chronic infarct involving the right medial temporal lobe in the right occipital lobe & remote right pontine and left lateral cerebellar hemisphere infarcts.  Vascular Neurology was consulted and stated patient can stay at [facility].  Psychiatry was consulted and recommended pec giving grave disability.  Patient was given aspirin 325 mg and was admitted for further management.      Overview/Hospital Course:  No notes on file    Interval History: No acute events overnight.  At time of my visit he is calm and collected.  States vision has returned to normal.  Only complaint is nausea.  Appears mildly confused.  All questions answered and patient had no further complaints.    Objective:     Vital Signs (Most  Recent):  Temp: 97.7 °F (36.5 °C) (08/16/23 1131)  Pulse: 92 (08/16/23 1131)  Resp: 18 (08/16/23 1131)  BP: 131/80 (08/16/23 1131)  SpO2: 95 % (08/16/23 1131) Vital Signs (24h Range):  Temp:  [97.7 °F (36.5 °C)-98.6 °F (37 °C)] 97.7 °F (36.5 °C)  Pulse:  [] 92  Resp:  [15-22] 18  SpO2:  [95 %-99 %] 95 %  BP: (131-170)/(71-88) 131/80     Weight: 68 kg (150 lb)  Body mass index is 22.81 kg/m².  No intake or output data in the 24 hours ending 08/16/23 1132      Physical Exam  Vitals and nursing note reviewed.   Constitutional:       General: He is not in acute distress.     Appearance: Normal appearance. He is ill-appearing.   HENT:      Head: Normocephalic and atraumatic.      Nose: Nose normal.      Mouth/Throat:      Mouth: Mucous membranes are moist.   Eyes:      Extraocular Movements: Extraocular movements intact.   Cardiovascular:      Rate and Rhythm: Regular rhythm. Tachycardia present.      Pulses: Normal pulses.      Heart sounds: No murmur heard.  Pulmonary:      Effort: Pulmonary effort is normal. No respiratory distress.   Abdominal:      General: Abdomen is flat.      Palpations: Abdomen is soft.      Tenderness: There is no abdominal tenderness.   Skin:     General: Skin is warm.      Capillary Refill: Capillary refill takes less than 2 seconds.   Neurological:      Mental Status: He is alert.      Comments: Alert, oriented, but poor insight into his medical conditions.  Mild diffuse weakness, participates with my interview and exam   Psychiatric:      Comments: Denies SI/HI as well as AH/VH.             Significant Labs: All pertinent labs within the past 24 hours have been reviewed.    Significant Imaging: I have reviewed all pertinent imaging results/findings within the past 24 hours.      Assessment/Plan:      * Stroke  -Admitted to inpatient status  -Presented for evaluation of backwards fall a week ago and vision changes since then  -Noted CT head (8/2) showing a new left cerebellar infarct.   Patient left AMA at that time  -In ER this admit, CTA head showed cut off involving a part of the P2 segment of the right PCA, subacute to chronic infarct involving the right medial temporal lobe in the right occipital lobe & remote right pontine and left lateral cerebellar hemisphere infarcts.    -Suspect related to substance abuse - noted amphetamines in UDS  -Check A1c, FLP/LDL, Echo with bubble and CUS.  -Consult tele-neurology, PT, OT and SLP  -Given likely subacute nature as well as resolution of visual changes will pursue normotension  -Treat with DAPT - plavix x 21 days and aspirin 81mg indefintely  -Continue statin  -Patient presently under PEC.    Polysubstance use disorder  -Urine drug screen positive for amphetamines and benzodiazepines  -Patient denies use  -Likely contributor to medical illness as well as mood alterations and combativeness.  -Counseled on cessation    Noncompliance with medication regimen  -Noted history as well as illicit substance use and prior AMA  -Counseled     Substance induced mood disorder  -Noted agitation, combativeness and grandiosity of thoughts  -Seen by tele-psych in ER and given provisional diagnosis of Ila and r/o mood disorder  -Placed under PEC and recommended inpatient psych placement once medically stable  -OK to use Geodon or Zyprexa PO/IM q 6 hours prn severe agitation   -Resume home seroquel qhs    Type 1 diabetes  -Last A1c 10.4 1/27/23  -Repeat A1c  -Blood sugars severely elevated in ER - up to 632.  -Anion-gap normal.  CO2 normal.  Beta hydroxy slightly elevated  -Given insulin in ER and blood sugar down to 362.  -He is at risk for developing karissa DKA -   -Start IV fluids, monitor blood sugars and BMP q4h  -Start home insulin regimen: Detemir 20 units qd and lispro 7 units tidwm  -Add moderate dose SSI q4h prn    CAD (coronary artery disease)  -Denies chest pain  -Continue aspirin and statin  -Monitor on telemetry    COPD (chronic obstructive pulmonary  disease)  -No wheezing at this time  -P.rchastity Cam.    Hyperlipidemia  -Continue statin.    Primary hypertension  -BP normal to moderately elevated  -Given subacute nature of his stroke - pursuing normotension  -No BP meds on home list  -Given CAD history will add metoprolol.    VTE Risk Mitigation (From admission, onward)         Ordered     enoxaparin injection 40 mg  Every 24 hours         08/16/23 1155     IP VTE LOW RISK PATIENT  Once         08/16/23 0025     Place sequential compression device  Until discontinued         08/16/23 0025                Discharge Planning   ROLA:      Code Status: Full Code   Is the patient medically ready for discharge?:     Reason for patient still in hospital (select all that apply): Treatment                     Cullen Jackman MD  Department of Hospital Medicine   [facility] - Telemetry

## 2023-08-16 NOTE — H&P
[facility] [place]  [facility] Medicine  History & Physical    Patient Name: Cali Mabry  MRN: [id number]  Patient Class: IP- Inpatient  Admission Date: 8/15/2023  Attending Physician: Cullen Jackman MD   Primary Care Provider: Tiff Primary Doctor         Patient information was obtained from patient and ER records.     Subjective:     Principal Problem:Stroke    Chief Complaint:   Chief Complaint   Patient presents with    Headache     EMS called to 58yo male that fell a week ago and has had a headache and loss of vision since the fall. He stated he fell backwards and hit his head.        HPI: This is a 57-year-old male with a past medical history of type 1 diabetes, COPD (not on O2), CAD, hypertension, hyperlipidemia, substance use, mood disorder who presents with blurry vision.    Patient had multiple ED presentations for headaches, blurry vision, eye pain this month.  On 08/02, he presented to [facility] and had a CT head that showed a new left cerebellar infarct, however left against medical advice.  He returned to  ED on 8/7 and to [facility] ED for worsening vision loss, however left AMA.  He had a brief hospitalization to  on 8/9 for DKA after presenting with similar symptoms as well.  Patient is known to be verbally abusive to staff.    In the ED, the patient was hypertensive.  Labs were remarkable for hypercholesterolemia (297).  UDS was positive for amphetamines and benzodiazepines.  CTA head showed Cut off involving a part of the P2 segment of the right PCA,  subacute to chronic infarct involving the right medial temporal lobe in the right occipital lobe & remote right pontine and left lateral cerebellar hemisphere infarcts.  Vascular Neurology was consulted and stated patient can stay at [facility].  Psychiatry was consulted and recommended pec giving grave disability.  Patient was given aspirin 325 mg and was admitted for further management.      Past Medical History:   Diagnosis Date    Acute on chronic  pancreatitis 04/03/2016    Addiction to drug     CHF (congestive heart failure)     DKA (diabetic ketoacidoses) 02/2020    DM (diabetes mellitus), type 1     HCV (hepatitis C virus)     high VL     HTN (hypertension)     Hx of psychiatric care     Hypomagnesemia 05/25/2020    IVDU (intravenous drug user)     Heroin    Pancreatitis     Psychiatric problem     Substance abuse        Past Surgical History:   Procedure Laterality Date    CARDIAC SURGERY  1999    stent placed. ([facility])    ESOPHAGOGASTRODUODENOSCOPY N/A 3/5/2020    Gastritis.  No H pylori.  Completed PPI for 1 month.  Procedure: EGD (ESOPHAGOGASTRODUODENOSCOPY);  Surgeon: Brinda Rene MD;  Location: [facility] ENDO;  Service: Endoscopy;  Laterality: N/A;  W421 A; [id number]    LEFT HEART CATHETERIZATION Left 9/28/2020    Procedure: Left heart cath;  Surgeon: Fito Rangel MD;  Location: [facility] CATH LAB;  Service: Cardiology;  Laterality: Left;    SHOULDER SURGERY  2013    right shoulder, spur removal.       Review of patient's allergies indicates:  No Known Allergies    No current facility-administered medications on file prior to encounter.     Current Outpatient Medications on File Prior to Encounter   Medication Sig    albuterol (PROVENTIL/VENTOLIN HFA) 90 mcg/actuation inhaler Inhale 1-2 puffs into the lungs every 6 (six) hours as needed for Wheezing. Rescue    aspirin 81 MG Chew Take 1 tablet (81 mg total) by mouth once daily.    atorvastatin (LIPITOR) 20 MG tablet Take 1 tablet (20 mg total) by mouth once daily.    cetirizine (ZYRTEC) 10 MG tablet Take 1 tablet (10 mg total) by mouth once daily.    gabapentin (NEURONTIN) 400 MG capsule Take 400 mg by mouth 3 (three) times daily.    insulin detemir U-100, Levemir, 100 unit/mL (3 mL) SubQ InPn pen Inject 20 Units into the skin every evening.    insulin lispro 100 unit/mL pen Inject 7 Units into the skin 3 (three) times daily with meals.    levocetirizine (XYZAL) 5 MG  tablet Take 5 mg by mouth every evening.    loratadine (CLARITIN) 10 mg tablet Take 10 mg by mouth.    naloxone (NARCAN) 4 mg/actuation Spry 4mg by nasal route as needed for opioid overdose; may repeat every 2-3 minutes in alternating nostrils until medical help arrives. Call 911    nitroGLYCERIN (NITROSTAT) 0.4 MG SL tablet Place 1 tablet (0.4 mg total) under the tongue every 5 (five) minutes as needed for Chest pain. (Patient not taking: Reported on 9/16/2022)    QUEtiapine (SEROQUEL) 100 MG Tab Take 100 mg by mouth every evening.    sertraline (ZOLOFT) 25 MG tablet Take 25 mg by mouth 2 (two) times daily.    [DISCONTINUED] insulin aspart U-100 (NOVOLOG) 100 unit/mL injection Inject 1-10 Units into the skin 3 (three) times daily with meals.     Family History       Problem Relation (Age of Onset)    Asthma Mother          Tobacco Use    Smoking status: Every Day     Current packs/day: 0.00     Average packs/day: 1 pack/day for 39.2 years (39.2 ttl pk-yrs)     Types: Cigarettes     Start date: 4/3/1981     Last attempt to quit: 6/1/2020     Years since quitting: 3.2    Smokeless tobacco: Never    Tobacco comments:     states approx 1 per day.   Substance and Sexual Activity    Alcohol use: Not Currently     Alcohol/week: 0.0 standard drinks of alcohol    Drug use: Yes     Types: Heroin, IV, Marijuana, Amphetamines     Comment: chronic methamphetamine user    Sexual activity: Yes     Partners: Female     Review of Systems   Unable to perform ROS: Psychiatric disorder     Objective:     Vital Signs (Most Recent):  Temp: 98.4 °F (36.9 °C) (08/15/23 2139)  Pulse: 94 (08/15/23 2139)  Resp: 16 (08/15/23 2139)  BP: (!) 169/80 (08/15/23 2139)  SpO2: 99 % (08/15/23 2139) Vital Signs (24h Range):  Temp:  [98.2 °F (36.8 °C)-98.4 °F (36.9 °C)] 98.4 °F (36.9 °C)  Pulse:  [] 94  Resp:  [16-18] 16  SpO2:  [98 %-99 %] 99 %  BP: (162-169)/(80-88) 169/80     Weight: 68 kg (150 lb)  Body mass index is 22.81 kg/m².      Physical Exam  Vitals and nursing note reviewed.   Constitutional:       General: He is not in acute distress.     Appearance: Normal appearance. He is not ill-appearing.   HENT:      Head: Normocephalic and atraumatic.      Nose: Nose normal.      Mouth/Throat:      Mouth: Mucous membranes are moist.   Eyes:      Extraocular Movements: Extraocular movements intact.   Cardiovascular:      Rate and Rhythm: Normal rate.      Pulses: Normal pulses.      Heart sounds: No murmur heard.  Pulmonary:      Effort: Pulmonary effort is normal. No respiratory distress.   Abdominal:      General: Abdomen is flat.      Palpations: Abdomen is soft.      Tenderness: There is no abdominal tenderness.   Skin:     General: Skin is warm.      Capillary Refill: Capillary refill takes less than 2 seconds.   Neurological:      Mental Status: He is alert.      Comments: Uncooperative with exam                  Significant Labs: All pertinent labs within the past 24 hours have been reviewed.    Significant Imaging: I have reviewed all pertinent imaging results/findings within the past 24 hours.    Assessment/Plan:     * Stroke  CT head (8/2) that showed a new left cerebellar infarct, however left against medical advice.    CTA head showed Cut off involving a part of the P2 segment of the right PCA,  subacute to chronic infarct involving the right medial temporal lobe in the right occipital lobe & remote right pontine and left lateral cerebellar hemisphere infarcts.      Plan:  Consult tele neurology  Continue aspirin, statin  Stroke workup    CAD (coronary artery disease)  Resume aspirin, statin      COPD (chronic obstructive pulmonary disease)  No acute issues.  Wander Cam.      Substance induced mood disorder  Psychiatry consulted.  Patient was PEC'd.  Geodon or Zyprexa PO/IM q 6 hours prn severe agitation     Noncompliance with medication regimen        Type 1 diabetes  Patient's FSGs are uncontrolled due to hyperglycemia on  "current medication regimen.  Last A1c reviewed-   Lab Results   Component Value Date    HGBA1C 10.4 (H) 01/27/2023     Most recent fingerstick glucose reviewed- No results for input(s): "POCTGLUCOSE" in the last 24 hours.  Current correctional scale  Low  Maintain anti-hyperglycemic dose as follows-   Antihyperglycemics (From admission, onward)    Start     Stop Route Frequency Ordered    08/16/23 0900  insulin detemir U-100 (Levemir) pen 10 Units         -- SubQ Daily 08/16/23 0025    08/16/23 0125  insulin aspart U-100 pen 0-5 Units         -- SubQ Before meals & nightly PRN 08/16/23 0025          Polysubstance use disorder  Counseled on cessation      Hyperlipidemia  Resume statin.      Primary hypertension  Monitor blood pressure.      VTE Risk Mitigation (From admission, onward)         Ordered     IP VTE LOW RISK PATIENT  Once         08/16/23 0025     Place sequential compression device  Until discontinued         08/16/23 0025                 Josep Simon MD  Department of Hospital Medicine  [facility] - Emergency Dept  "

## 2023-08-16 NOTE — PLAN OF CARE
Problem: Physical Therapy  Goal: Physical Therapy Goal  Description: Goals to be met by:      Patient will increase functional independence with mobility by performin. Pt to be (I) with bed mobility.  2. Pt to transfer with (S).  3. Pt to ambulate 150' /c or /s AD and (S).    Outcome: Ongoing, Progressing   Initial eval completed, see in chart for details.

## 2023-08-16 NOTE — PHARMACY MED REC
"  Admission Medication History     The home medication history was taken by Magnolia Quinones CPhT.      You may go to "Admission" then "Reconcile Home Medications" tabs to review and/or act upon these items.     The home medication list has been updated by the Pharmacy department.   Please read ALL comments highlighted in yellow.   Please address this information as you see fit.    Feel free to contact us if you have any questions or require assistance.        Medications listed below were obtained from: Patient/family and Analytic software- [employer]  (Not in a hospital admission)      Potential issues to be addressed PRIOR TO DISCHARGE  Please discuss with the patient barriers to adherence with medication treatment plans  Patient requires education regarding drug therapies       Patient stated that he is currently taking Lantus and Humalog unknown last dose.    Was not able to complete medication history due to patient becoming irate.        Magnolia Quinones CPhT.  [phone]        .        "

## 2023-08-16 NOTE — ASSESSMENT & PLAN NOTE
-Last A1c 10.4 1/27/23  -Repeat A1c  -Blood sugars severely elevated in ER - up to 632.  -Anion-gap normal.  CO2 normal.  Beta hydroxy slightly elevated  -Given insulin in ER and blood sugar down to 362.  -He is at risk for developing karissa DKA -   -Start IV fluids, monitor blood sugars and BMP q4h  -Start home insulin regimen: Detemir 20 units qd and lispro 7 units tidwm  -Add moderate dose SSI q4h prn

## 2023-08-16 NOTE — PT/OT/SLP EVAL
Physical Therapy Evaluation    Patient Name:  Cali Mabry   MRN:  [id number]    Recommendations:     Discharge Recommendations: home health PT   Discharge Equipment Recommendations: none       Assessment:     Cali Mabry is a 57 y.o. male admitted with a medical diagnosis of Stroke.  He presents with the following impairments/functional limitations: impaired functional mobility, gait instability.    Rehab Prognosis: Good; patient would benefit from acute skilled PT services to address these deficits and reach maximum level of function.    Recent Surgery: * No surgery found *      Plan:     During this hospitalization, patient to be seen 3 x/week to address the identified rehab impairments via gait training, therapeutic activities, therapeutic exercises and progress toward the following goals:    Plan of Care Expires:  08/18/23    Subjective     Chief Complaint: Decreased vision  Patient/Family Comments/goals: Pt agreeable to therapy evaluations and treatment.  Pain/Comfort:  Pain Rating 1: 0/10    Patients cultural, spiritual, Christian conflicts given the current situation: no    Living Environment:  PTA pt lived with his mother, niece and nephew.  Prior to admission, patients level of function was independent.  Equipment used at home: none.  DME owned (not currently used): rolling walker and single point cane.  Upon discharge, patient will have assistance from family.    Objective:     Communicated with nurseQuincy prior to session.  Patient found right sidelying with telemetry  upon PT entry to room.    General Precautions: Standard,    Orthopedic Precautions:N/A   Braces: N/A  Respiratory Status: Room air    Exams:  Cognitive Exam:  Patient is oriented to Person, Place, and Situation  RLE ROM: WFL  RLE Strength: WFL  LLE ROM: WFL  LLE Strength: WFL    Functional Mobility:  Bed Mobility:     Supine to Sit: contact guard assistance  Transfers:     Sit to Stand:  contact guard assistance with no  AD  Gait: 10' to sink with HHA  Balance: Fair+ static/dynamic standing; Good static sitting      AM-PAC 6 CLICK MOBILITY  Total Score:18       Treatment & Education:  Educated on role of PT and POC, ambulated to sink for ADLs with OT then to B/S chair.  Pt instructed on/performed 1x10 reps BLE exs seated including LAQ and marching.    Patient left  reclined in chair  with  PCT/safety sitter present .    GOALS:   Multidisciplinary Problems       Physical Therapy Goals          Problem: Physical Therapy    Goal Priority Disciplines Outcome Goal Variances Interventions   Physical Therapy Goal     PT, PT/OT Ongoing, Progressing     Description: Goals to be met by:      Patient will increase functional independence with mobility by performin. Pt to be (I) with bed mobility.  2. Pt to transfer with (S).  3. Pt to ambulate 150' /c or /s AD and (S).                         History:     Past Medical History:   Diagnosis Date    Acute on chronic pancreatitis 2016    Addiction to drug     CHF (congestive heart failure)     DKA (diabetic ketoacidoses) 2020    DM (diabetes mellitus), type 1     HCV (hepatitis C virus)     high VL     HTN (hypertension)     Hx of psychiatric care     Hypomagnesemia 2020    IVDU (intravenous drug user)     Heroin    Pancreatitis     Psychiatric problem     Substance abuse        Past Surgical History:   Procedure Laterality Date    CARDIAC SURGERY      stent placed. ([facility])    ESOPHAGOGASTRODUODENOSCOPY N/A 3/5/2020    Gastritis.  No H pylori.  Completed PPI for 1 month.  Procedure: EGD (ESOPHAGOGASTRODUODENOSCOPY);  Surgeon: Brinda Rene MD;  Location: [facility] ENDO;  Service: Endoscopy;  Laterality: N/A;  W421 A; [phone]    LEFT HEART CATHETERIZATION Left 2020    Procedure: Left heart cath;  Surgeon: Fito Rangel MD;  Location: [facility] CATH LAB;  Service: Cardiology;  Laterality: Left;    SHOULDER SURGERY      right shoulder, spur removal.        Time Tracking:     PT Received On: 08/16/23  PT Start Time: 1046     PT Stop Time: 1109  PT Total Time (min): 23 min     Billable Minutes: Evaluation 15 and Gait Training 8      08/16/2023

## 2023-08-16 NOTE — PLAN OF CARE
Problem: Occupational Therapy  Goal: Occupational Therapy Goal  Description: Goals to be met by: 08/30/23     Patient will increase functional independence with ADLs by performing:    LE Dressing with Supervision.  Grooming while standing at sink with Supervision.  Toileting from toilet with Supervision for hygiene and clothing management.   Supine to sit with Supervision.  Step transfer with Supervision  Toilet transfer to toilet with Supervision.  Upper extremity exercise program x15 reps per handout, with independence.    Outcome: Ongoing, Progressing     OT rec HHOT with family supervision at d/c in order to increase safety and independence with ADLs and all aspects of functional mobility.

## 2023-08-17 LAB
ANION GAP SERPL CALC-SCNC: 10 MMOL/L (ref 8–16)
BASOPHILS # BLD AUTO: 0.01 K/UL (ref 0–0.2)
BASOPHILS NFR BLD: 0.2 % (ref 0–1.9)
BUN SERPL-MCNC: 22 MG/DL (ref 6–20)
CALCIUM SERPL-MCNC: 8.7 MG/DL (ref 8.7–10.5)
CHLORIDE SERPL-SCNC: 97 MMOL/L (ref 95–110)
CO2 SERPL-SCNC: 25 MMOL/L (ref 23–29)
CREAT SERPL-MCNC: 1 MG/DL (ref 0.5–1.4)
DIFFERENTIAL METHOD: ABNORMAL
EOSINOPHIL # BLD AUTO: 0.1 K/UL (ref 0–0.5)
EOSINOPHIL NFR BLD: 2.1 % (ref 0–8)
ERYTHROCYTE [DISTWIDTH] IN BLOOD BY AUTOMATED COUNT: 16.9 % (ref 11.5–14.5)
EST. GFR  (NO RACE VARIABLE): >60 ML/MIN/1.73 M^2
GLUCOSE SERPL-MCNC: 338 MG/DL (ref 70–110)
HCT VFR BLD AUTO: 35.8 % (ref 40–54)
HGB BLD-MCNC: 11.2 G/DL (ref 14–18)
IMM GRANULOCYTES # BLD AUTO: 0.04 K/UL (ref 0–0.04)
IMM GRANULOCYTES NFR BLD AUTO: 0.8 % (ref 0–0.5)
LYMPHOCYTES # BLD AUTO: 0.9 K/UL (ref 1–4.8)
LYMPHOCYTES NFR BLD: 17 % (ref 18–48)
MAGNESIUM SERPL-MCNC: 1.9 MG/DL (ref 1.6–2.6)
MCH RBC QN AUTO: 25.7 PG (ref 27–31)
MCHC RBC AUTO-ENTMCNC: 31.3 G/DL (ref 32–36)
MCV RBC AUTO: 82 FL (ref 82–98)
MONOCYTES # BLD AUTO: 0.6 K/UL (ref 0.3–1)
MONOCYTES NFR BLD: 11 % (ref 4–15)
NEUTROPHILS # BLD AUTO: 3.6 K/UL (ref 1.8–7.7)
NEUTROPHILS NFR BLD: 68.9 % (ref 38–73)
NRBC BLD-RTO: 0 /100 WBC
PLATELET # BLD AUTO: 304 K/UL (ref 150–450)
PMV BLD AUTO: 8.9 FL (ref 9.2–12.9)
POCT GLUCOSE: 81 MG/DL (ref 70–110)
POCT GLUCOSE: >500 MG/DL (ref 70–110)
POTASSIUM SERPL-SCNC: 4.6 MMOL/L (ref 3.5–5.1)
RBC # BLD AUTO: 4.36 M/UL (ref 4.6–6.2)
SODIUM SERPL-SCNC: 132 MMOL/L (ref 136–145)
WBC # BLD AUTO: 5.18 K/UL (ref 3.9–12.7)

## 2023-08-17 PROCEDURE — 94761 N-INVAS EAR/PLS OXIMETRY MLT: CPT

## 2023-08-17 PROCEDURE — 97116 GAIT TRAINING THERAPY: CPT

## 2023-08-17 PROCEDURE — 85025 COMPLETE CBC W/AUTO DIFF WBC: CPT | Performed by: HOSPITALIST

## 2023-08-17 PROCEDURE — 92526 ORAL FUNCTION THERAPY: CPT

## 2023-08-17 PROCEDURE — 97535 SELF CARE MNGMENT TRAINING: CPT

## 2023-08-17 PROCEDURE — S0166 INJ OLANZAPINE 2.5MG: HCPCS | Performed by: STUDENT IN AN ORGANIZED HEALTH CARE EDUCATION/TRAINING PROGRAM

## 2023-08-17 PROCEDURE — 36415 COLL VENOUS BLD VENIPUNCTURE: CPT | Performed by: HOSPITALIST

## 2023-08-17 PROCEDURE — 25000003 PHARM REV CODE 250: Performed by: HOSPITALIST

## 2023-08-17 PROCEDURE — 21400001 HC TELEMETRY ROOM

## 2023-08-17 PROCEDURE — 99900035 HC TECH TIME PER 15 MIN (STAT)

## 2023-08-17 PROCEDURE — 25000003 PHARM REV CODE 250: Performed by: STUDENT IN AN ORGANIZED HEALTH CARE EDUCATION/TRAINING PROGRAM

## 2023-08-17 PROCEDURE — 80048 BASIC METABOLIC PNL TOTAL CA: CPT | Performed by: HOSPITALIST

## 2023-08-17 PROCEDURE — 63600175 PHARM REV CODE 636 W HCPCS: Performed by: PHYSICIAN ASSISTANT

## 2023-08-17 PROCEDURE — 83735 ASSAY OF MAGNESIUM: CPT | Performed by: HOSPITALIST

## 2023-08-17 RX ORDER — INSULIN ASPART 100 [IU]/ML
1-10 INJECTION, SOLUTION INTRAVENOUS; SUBCUTANEOUS
Status: DISCONTINUED | OUTPATIENT
Start: 2023-08-17 | End: 2023-08-22 | Stop reason: HOSPADM

## 2023-08-17 RX ADMIN — ATORVASTATIN CALCIUM 20 MG: 10 TABLET, FILM COATED ORAL at 09:08

## 2023-08-17 RX ADMIN — INSULIN ASPART 5 UNITS: 100 INJECTION, SOLUTION INTRAVENOUS; SUBCUTANEOUS at 09:08

## 2023-08-17 RX ADMIN — ACETAMINOPHEN 650 MG: 325 TABLET ORAL at 09:08

## 2023-08-17 RX ADMIN — ASPIRIN 81 MG CHEWABLE TABLET 81 MG: 81 TABLET CHEWABLE at 09:08

## 2023-08-17 RX ADMIN — OLANZAPINE 5 MG: 10 INJECTION, POWDER, FOR SOLUTION INTRAMUSCULAR at 09:08

## 2023-08-17 RX ADMIN — METOPROLOL SUCCINATE 25 MG: 25 TABLET, EXTENDED RELEASE ORAL at 09:08

## 2023-08-17 RX ADMIN — MUPIROCIN: 20 OINTMENT TOPICAL at 09:08

## 2023-08-17 RX ADMIN — CLOPIDOGREL BISULFATE 75 MG: 75 TABLET, FILM COATED ORAL at 09:08

## 2023-08-17 RX ADMIN — QUETIAPINE 100 MG: 100 TABLET ORAL at 09:08

## 2023-08-17 RX ADMIN — ACETAMINOPHEN 650 MG: 325 TABLET ORAL at 03:08

## 2023-08-17 NOTE — NURSING
Patient's mother came to visit. Patient started verbally lashing out at mother. Mother asked for MD to call her  niece and left due to patients behavior. MD notified.

## 2023-08-17 NOTE — PROGRESS NOTES
[facility] Medicine  Progress Note    Patient Name: Cali Mabry  MRN: [id number]  Patient Class: IP- Inpatient   Admission Date: 8/15/2023  Length of Stay: 2 days  Attending Physician: Cullen Jackman MD  Primary Care Provider: Lawanda Flores NP        Subjective:     Principal Problem:Stroke        HPI:  This is a 57-year-old male with a past medical history of type 1 diabetes, COPD (not on O2), CAD, hypertension, hyperlipidemia, substance use, mood disorder who presents with blurry vision.    Patient had multiple ED presentations for headaches, blurry vision, eye pain this month.  On 08/02, he presented to [facility] and had a CT head that showed a new left cerebellar infarct, however left against medical advice.  He returned to  ED on 8/7 and to [facility] ED for worsening vision loss, however left AMA.  He had a brief hospitalization to  on 8/9 for DKA after presenting with similar symptoms as well.  Patient is known to be verbally abusive to staff.    In the ED, the patient was hypertensive.  Labs were remarkable for hypercholesterolemia (297).  UDS was positive for amphetamines and benzodiazepines.  CTA head showed Cut off involving a part of the P2 segment of the right PCA,  subacute to chronic infarct involving the right medial temporal lobe in the right occipital lobe & remote right pontine and left lateral cerebellar hemisphere infarcts.  Vascular Neurology was consulted and stated patient can stay at [facility].  Psychiatry was consulted and recommended pec giving grave disability.  Patient was given aspirin 325 mg and was admitted for further management.      Overview/Hospital Course:  No notes on file    Interval History: No acute events overnight.  Remains very labile, agitated and aggressive at times.  During my visit quickly started yelling and cursing loudly at me, demanding to gohome right now.  Did not acknowledge he has had a stroke.  Discussed with his cousin, Marbella ([phone])  "at request of his mother.  She let me know that within the last week he stated multiple times he "wanted to kill himself".  He would not answer those questions now.  All questions answered and patient had no further complaints.    Objective:     Vital Signs (Most Recent):  Temp: 97.4 °F (36.3 °C) (08/17/23 0737)  Pulse: 79 (08/17/23 1134)  Resp: 18 (08/17/23 0737)  BP: 120/62 (08/17/23 1134)  SpO2: 95 % (08/17/23 1001) Vital Signs (24h Range):  Temp:  [97.4 °F (36.3 °C)-98.5 °F (36.9 °C)] 97.4 °F (36.3 °C)  Pulse:  [76-82] 79  Resp:  [18] 18  SpO2:  [95 %-98 %] 95 %  BP: (120-144)/(62-78) 120/62     Weight: 68 kg (149 lb 14.6 oz)  Body mass index is 22.79 kg/m².    Intake/Output Summary (Last 24 hours) at 8/17/2023 1226  Last data filed at 8/16/2023 1815  Gross per 24 hour   Intake 240 ml   Output --   Net 240 ml         Physical Exam  Vitals and nursing note reviewed.   Constitutional:       General: He is not in acute distress.     Appearance: Normal appearance. He is ill-appearing.   HENT:      Head: Normocephalic and atraumatic.      Nose: Nose normal.      Mouth/Throat:      Mouth: Mucous membranes are moist.   Eyes:      Extraocular Movements: Extraocular movements intact.   Cardiovascular:      Rate and Rhythm: Normal rate and regular rhythm.      Pulses: Normal pulses.      Heart sounds: No murmur heard.  Pulmonary:      Effort: Pulmonary effort is normal. No respiratory distress.   Abdominal:      General: Abdomen is flat.      Palpations: Abdomen is soft.      Tenderness: There is no abdominal tenderness.   Skin:     General: Skin is warm.      Capillary Refill: Capillary refill takes less than 2 seconds.   Neurological:      Mental Status: He is alert.      Comments: Alert, oriented, but poor insight into his medical conditions.  Mild diffuse weakness.  PT notes he can take a few steps around his bed, but are not confident he is able to navigate a typical inpatient psych unit.   Psychiatric:      " Comments: Today very labile, agitated and aggressive.  Quickly began yelling and cussing during my visit.  Would not answer most questions.  Family states that within the last week he has said several times he wishes to kill himself.             Significant Labs: All pertinent labs within the past 24 hours have been reviewed.    Significant Imaging: I have reviewed all pertinent imaging results/findings within the past 24 hours.      Assessment/Plan:      * Stroke  -Admitted to inpatient status  -Presented for evaluation of backwards fall a week ago and vision changes since then  -Noted CT head (8/2) showing a new left cerebellar infarct.  Patient left AMA at that time  -In ER this admit, CTA head showed cut off involving a part of the P2 segment of the right PCA, subacute to chronic infarct involving the right medial temporal lobe in the right occipital lobe & remote right pontine and left lateral cerebellar hemisphere infarcts.    -Suspect related to substance abuse - noted amphetamines in UDS  -A1c 9.5   , HDL 45,   LDL measurement invalid  -Echo showedEF 55-60%, diastolic dysfunction, negative bubble study.  -CUS showed 50-69% stenosis of the right ICA and 1-49% stenosis of the left ICA  -Consulted tele-neurology, PT, OT and SLP - input appreciated  -PT/OT recommend HH with PT, OT and SLP at discharge.  -Clinically improving - states vision symptoms have resolved.  Technically he is medically stable for discharge from hospital, however he needs inpatient psychiatric hospitalization and we are not quite confident he can safely navigate in that environment yet  -Continue DAPT (asa 81 + plavix 75) x 21 days and then stop aspirin.  -Continue statin  -Continue to pursue normotension  -Discharge pending ability to safely navigate around psych unit or placement in a medical psych unit.    Polysubstance use disorder  -Urine drug screen positive for amphetamines and benzodiazepines  -Patient denies  use  -Likely contributor to medical illness as well as mood alterations and combativeness.  -Counseled on cessation    Noncompliance with medication regimen  -Noted history as well as illicit substance use and prior AMA  -Counseled     Substance induced mood disorder  -Noted agitation, combativeness and grandiosity of thoughts on admit  -Seen by tele-psych in ER and given provisional diagnosis of Ila and r/o mood disorder  -Placed under PEC and recommended inpatient psych placement once medically stable  -Continues today with aggression, agitation and combativeness.  Strongly suspect substance induced mood disorder  -He needs inpatient psychiatric care, but we are not yet confident of his ability to ambulate in that environment.  If no medical psych beds available, will need to keep him here until we are confident of his ability to safely ambulate around a typical psych unit.  -Will re-consult tele-psych asking for recommendations beyond treatment of severe agitation.  -OK to use Geodon or Zyprexa PO/IM q 6 hours prn severe agitation   -Continue home seroquel qhs    Type 1 diabetes  -Last A1c 10.4 1/27/23  -A1c now 9.5  -Blood sugars severely elevated in ER - up to 632.  Was near DKA but this was averted and he remains with normal AG and CO2.  -Anion-gap normal.  CO2 normal.  Beta hydroxy slightly elevated  -Sugars remain labile - today have ranged from 81 to 338  -Continue home regimen   -Continue home insulin regimen: Detemir 20 units qd and lispro 7 units tidwm  -Continue moderate dose SSI ac/hs    CAD (coronary artery disease)  -Denies chest pain  -Continue aspirin and statin  -Monitor on telemetry    COPD (chronic obstructive pulmonary disease)  -No wheezing at this time  -P.gagan Cam.    Hyperlipidemia  -Continue statin.    Primary hypertension  -BP normal to moderately elevated  -Given subacute nature of his stroke - pursuing normotension  -No BP meds on home list  -Given CAD history added metoprolol  8/16 - continue for now      VTE Risk Mitigation (From admission, onward)         Ordered     enoxaparin injection 40 mg  Every 24 hours         08/16/23 1155     IP VTE LOW RISK PATIENT  Once         08/16/23 0025     Place sequential compression device  Until discontinued         08/16/23 0025                Discharge Planning   ROLA:      Code Status: Full Code   Is the patient medically ready for discharge?:     Reason for patient still in hospital (select all that apply): Treatment and Pending disposition  Discharge Plan A: [facility]                  Cullen Jackman MD  Department of [facility] Medicine   [facility]

## 2023-08-17 NOTE — PT/OT/SLP PROGRESS
Speech Language Pathology Treatment    Patient Name:  Cali Mabry   MRN:  [id number]  Admitting Diagnosis: Stroke    Recommendations:                 General Recommendations:   ST f/u for liquid level tolerance   Diet recommendations:  Regular Diet - IDDSI Level 7, Liquid Diet Level: Thin liquids - IDDSI Level 0   Aspiration Precautions: 1 bite/sip at a time, Alternating bites/sips, HOB to 90 degrees, and No straws   General Precautions: Standard,    Communication strategies:  none    Assessment:     Cali Mabry is a 57 y.o. male with a dx of Stroke, who presents with inconsistent throat clearing via thin liquids. Pt's overt s/s of aspiration dcr when consuming thin liquids via cup-edge, as well as when provided with verbal cues to consume small, single sips. Cont current diet of regular with thin liquid via CUP ONLY.    Subjective     Pt was asleep upon ST's entrance, with sitter at b/s. Pt easily arouse to ST's greeting and was agreeable to ST tx session.     Patient goals: Cont po intake      Pain/Comfort:  Pain Rating 1: 0/10    Respiratory Status: Room air    Objective:     Has the patient been evaluated by SLP for swallowing?   Yes  Keep patient NPO? No   Current Respiratory Status:        The pt indep consumed the following po items: half a cup of water via straw and cup-edge sips, as well as x1 bite sized piece of jose cracker. Pt with throat clearing noted during 2/4 straw sips, as well as 1/5 cup-edge sips. Pt attempting to consume larger, consecutive sips via cup-edge with resultant throat clearing, however, when consistently given verbal cues to consume single, small sips dcr in throat clearing noted. Adequate and timely oral prep with solid boluses.     Goals:   Multidisciplinary Problems       SLP Goals          Problem: SLP    Goal Priority Disciplines Outcome   SLP Goal    Medium SLP Ongoing, Progressing   Description: Goals:  1. Pt will participate in ongoing assessment of swallow-  progressing  2. Pt will tolerate a regular diet with thin liquids via CUP only w/o any overt s/s of aspiration across x2 consecutive sessions (1 of 2 met 8/17).                         Plan:     Patient to be seen:  2 x/week   Plan of Care expires:  08/24/23  Plan of Care reviewed with:  patient   SLP Follow-Up:  Yes       Discharge recommendations:  home health speech therapy   Barriers to Discharge:  None    Time Tracking:     SLP Treatment Date:   08/17/23  Speech Start Time:  0940  Speech Stop Time:  0955     Speech Total Time (min):  15 min    Billable Minutes: Treatment Swallowing Dysfunction 15    08/17/2023

## 2023-08-17 NOTE — PLAN OF CARE
Problem: Physical Therapy  Goal: Physical Therapy Goal  Description: Goals to be met by:      Patient will increase functional independence with mobility by performin. Pt to be (I) with bed mobility.  2. Pt to transfer with (S).  3. Pt to ambulate 150' /c or /s AD and (S).    Outcome: Ongoing, Progressing   Pt agreeable to sit up in chair for lunch, required some assistance for feeding, left with sitter present.

## 2023-08-17 NOTE — ASSESSMENT & PLAN NOTE
-Last A1c 10.4 1/27/23  -A1c now 9.5  -Blood sugars severely elevated in ER - up to 632.  Was near DKA but this was averted and he remains with normal AG and CO2.  -Anion-gap normal.  CO2 normal.  Beta hydroxy slightly elevated  -Sugars remain labile - today have ranged from 81 to 338  -Continue home regimen   -Continue home insulin regimen: Detemir 20 units qd and lispro 7 units tidwm  -Continue moderate dose SSI ac/hs

## 2023-08-17 NOTE — CONSULTS
Food & Nutrition  Education    Diet Education: Cardiac Diet    Learners: Domenic Mabry      Nutrition Education provided with handouts: DASH diet - Diet and Health - Low Salt Diet    Comments: RD consult for cardiac education. Pt with other care at time of visit. RD to re-attempt. Educations attached to discharge paperwork    8/18 Educations left at patient room for RD education when patient is able to receive education.    8/21 RD follow up. Pt with other care at time of visit. RD to reattempt follow up    Dietitian's contact information provided.       Please Re-consult as needed        Thanks!   Edda Loving, Registration Eligible, Provisional LDN

## 2023-08-17 NOTE — NURSING
[facility] [place] 4 [place]      8/16/2023       Skin assessed on: Admit    Pt refused skin assessment    [] No Pressure Injuries Present    []Prevention Measures Documented    [] Yes LDA  for Pressure Injury Previously documented     [] Yes New Pressure Injury Discovered   [] LDA for New Pressure Injury Added      Attending RN:  Quincy Leiva, RN     Second RN:

## 2023-08-17 NOTE — NURSING
Bedside handoff report given to oncoming nurse. Pt continues to refuse telemetry monitoring and IV fluids. Pt refused 4 eye skin assessment. Fall/safety precautions maintained. Call light and personal belongings within reach, sitter at bedside.

## 2023-08-17 NOTE — PLAN OF CARE
Problem: SLP  Goal: SLP Goal  Description: Goals:  1. Pt will participate in ongoing assessment of swallow- progressing  2. Pt will tolerate a regular diet with thin liquids via CUP only w/o any overt s/s of aspiration across x2 consecutive sessions (1 of 2 met 8/17).    Outcome: Ongoing, Progressing    Inconsistent throat clearing with thin liquids. Dcr w/ cup-edge sips and verbal cues to consume small sips. Cont current regular diet with thin liquids via cup ONLY.

## 2023-08-17 NOTE — PLAN OF CARE
Case Management Assessment     PCP: KYREE Flores  Pharmacy: [facility] [facility]    Patient Arrived From: Home  Existing Help at Home: None    Barriers to Discharge: Mental health/substance abuse    Discharge Plan:    A. Psychiatric placement   B. Psychiatric placement       08/17/23 0917   Discharge Assessment   Assessment Type Discharge Planning Assessment   Confirmed/corrected address, phone number and insurance Yes   Source of Information health record   When was your last doctors appointment? 08/02/23   Reason For Admission stroke   People in Home parent(s)   Facility Arrived From: home   Do you expect to return to your current living situation? No   Do you have help at home or someone to help you manage your care at home? No   Prior to hospitilization cognitive status: Alert/Oriented   Current cognitive status: Unable to Assess   Walking or Climbing Stairs ambulation difficulty, requires equipment  (none)   Dressing/Bathing   (none)   Equipment Currently Used at Home none   Readmission within 30 days? No   Patient currently being followed by outpatient case management? No   Do you currently have service(s) that help you manage your care at home? No   Do you take prescription medications? Yes   Do you have prescription coverage? Yes   Coverage [facility] DUAL COMPLETE HMO SNP   Do you have any problems affording any of your prescribed medications? No   Is the patient taking medications as prescribed? yes   Who is going to help you get home at discharge? PSYCH PLACEMENT   How do you get to doctors appointments? family or friend will provide   Are you on dialysis? No   Do you take coumadin? No   DME Needed Upon Discharge  none   Discharge Plan discussed with:   (MD)   Transition of Care Barriers Mental illness;Substance Abuse   Discharge Plan A Psychiatric hospital   Discharge Plan B Psychiatric hospital   Physical Activity   On average, how many days per week do you engage in moderate to  strenuous exercise (like a brisk walk)? 0 days   On average, how many minutes do you engage in exercise at this level? 0 min   Financial Resource Strain   How hard is it for you to pay for the very basics like food, housing, medical care, and heating? Not very   Housing Stability   In the last 12 months, was there a time when you were not able to pay the mortgage or rent on time? N   In the last 12 months, how many places have you lived? 1   In the last 12 months, was there a time when you did not have a steady place to sleep or slept in a shelter (including now)? N   Transportation Needs   In the past 12 months, has lack of transportation kept you from medical appointments or from getting medications? no   In the past 12 months, has lack of transportation kept you from meetings, work, or from getting things needed for daily living? No   Food Insecurity   Within the past 12 months, you worried that your food would run out before you got the money to buy more. Never true   Within the past 12 months, the food you bought just didn't last and you didn't have money to get more. Never true   Stress   Do you feel stress - tense, restless, nervous, or anxious, or unable to sleep at night because your mind is troubled all the time - these days?   (UNABLE TO ACCESS)   Social Connections   In a typical week, how many times do you talk on the phone with family, friends, or neighbors? More than 3   How often do you get together with friends or relatives? More than 3   Do you belong to any clubs or organizations such as Adventism groups, unions, fraternal or athletic groups, or school groups? No   How often do you attend meetings of the clubs or organizations you belong to? Never   Are you , , , , never , or living with a partner? Never marrie   Alcohol Use   Q1: How often do you have a drink containing alcohol? 4 or more ti   Q2: How many drinks containing alcohol do you have on a typical day  when you are drinking? 5 or 6   Q3: How often do you have six or more drinks on one occasion? Daily   OTHER   Name(s) of People in Home MOTHER

## 2023-08-17 NOTE — PT/OT/SLP PROGRESS
Physical Therapy Treatment    Patient Name:  Cali Mabry   MRN:  [id number]    Recommendations:     Discharge Recommendations: psychiatric facility  Discharge Equipment Recommendations: none    Assessment:     Cali Mabry is a 57 y.o. male admitted with a medical diagnosis of Stroke.  He presents with the following impairments/functional limitations: visual deficits.    Rehab Prognosis: Good; patient would benefit from acute skilled PT services to address these deficits and reach maximum level of function.    Recent Surgery: * No surgery found *      Plan:     During this hospitalization, patient to be seen 3 x/week to address the identified rehab impairments via gait training, therapeutic activities, therapeutic exercises and progress toward the following goals:    Plan of Care Expires:  08/18/23    Subjective     Chief Complaint: None  Patient/Family Comments/goals: Pt agreeable to get OOB to chair for lunch.  Pain/Comfort:  Pain Rating 1: 0/10      Objective:     Communicated with nurseBecca prior to session.  Patient found right sidelying with  (safety sitter present) upon PT entry to room.     General Precautions: Standard, fall  Orthopedic Precautions: N/A  Braces: N/A  Respiratory Status: Room air     Functional Mobility:  Bed Mobility:     Supine to Sit: modified independence  Transfers:     Sit to Stand:  supervision with no AD  Gait: 10' walking around bed to chair using chair to hold onto.  Balance: Fair/Fair+ dynamic standing      AM-PAC 6 CLICK MOBILITY  Turning over in bed (including adjusting bedclothes, sheets and blankets)?: 4  Sitting down on and standing up from a chair with arms (e.g., wheelchair, bedside commode, etc.): 3  Moving from lying on back to sitting on the side of the bed?: 4  Moving to and from a bed to a chair (including a wheelchair)?: 3  Need to walk in hospital room?: 3  Climbing 3-5 steps with a railing?: 3  Basic Mobility Total Score: 20       Treatment &  Education:  Pt set up with lunch tray, pt with difficulty picking up food and eating; somewhat lethargic and required assistance for a few spoonfuls before he was able to eat a bite independently.  Sitter left and offered pt to assist.    Patient left up in chair with  safety sitter present and all needs in reach .    GOALS:   Multidisciplinary Problems       Physical Therapy Goals          Problem: Physical Therapy    Goal Priority Disciplines Outcome Goal Variances Interventions   Physical Therapy Goal     PT, PT/OT Ongoing, Progressing     Description: Goals to be met by:      Patient will increase functional independence with mobility by performin. Pt to be (I) with bed mobility.  2. Pt to transfer with (S).  3. Pt to ambulate 150' /c or /s AD and (S).                         Time Tracking:     PT Received On: 23  PT Start Time: 1143     PT Stop Time: 1154  PT Total Time (min): 11 min     Billable Minutes: Gait Training 11    Treatment Type: Treatment  PT/PTA: PT           2023

## 2023-08-17 NOTE — ASSESSMENT & PLAN NOTE
-Admitted to inpatient status  -Presented for evaluation of backwards fall a week ago and vision changes since then  -Noted CT head (8/2) showing a new left cerebellar infarct.  Patient left AMA at that time  -In ER this admit, CTA head showed cut off involving a part of the P2 segment of the right PCA, subacute to chronic infarct involving the right medial temporal lobe in the right occipital lobe & remote right pontine and left lateral cerebellar hemisphere infarcts.    -Suspect related to substance abuse - noted amphetamines in UDS  -A1c 9.5   , HDL 45,   LDL measurement invalid  -Echo showedEF 55-60%, diastolic dysfunction, negative bubble study.  -CUS showed 50-69% stenosis of the right ICA and 1-49% stenosis of the left ICA  -Consulted tele-neurology, PT, OT and SLP - input appreciated  -PT/OT recommend HH with PT, OT and SLP at discharge.  -Clinically improving - states vision symptoms have resolved.  Technically he is medically stable for discharge from hospital, however he needs inpatient psychiatric hospitalization and we are not quite confident he can safely navigate in that environment yet  -Continue DAPT (asa 81 + plavix 75) x 21 days and then stop aspirin.  -Continue statin  -Continue to pursue normotension  -Discharge pending ability to safely navigate around psych unit or placement in a medical psych unit.

## 2023-08-17 NOTE — SUBJECTIVE & OBJECTIVE
"Interval History: No acute events overnight.  Remains very labile, agitated and aggressive at times.  During my visit quickly started yelling and cursing loudly at me, demanding to gohome right now.  Did not acknowledge he has had a stroke.  Discussed with his cousin, Marbella ([phone]) at request of his mother.  She let me know that within the last week he stated multiple times he "wanted to kill himself".  He would not answer those questions now.  All questions answered and patient had no further complaints.    Objective:     Vital Signs (Most Recent):  Temp: 97.4 °F (36.3 °C) (08/17/23 0737)  Pulse: 79 (08/17/23 1134)  Resp: 18 (08/17/23 0737)  BP: 120/62 (08/17/23 1134)  SpO2: 95 % (08/17/23 1001) Vital Signs (24h Range):  Temp:  [97.4 °F (36.3 °C)-98.5 °F (36.9 °C)] 97.4 °F (36.3 °C)  Pulse:  [76-82] 79  Resp:  [18] 18  SpO2:  [95 %-98 %] 95 %  BP: (120-144)/(62-78) 120/62     Weight: 68 kg (149 lb 14.6 oz)  Body mass index is 22.79 kg/m².    Intake/Output Summary (Last 24 hours) at 8/17/2023 1226  Last data filed at 8/16/2023 1815  Gross per 24 hour   Intake 240 ml   Output --   Net 240 ml         Physical Exam  Vitals and nursing note reviewed.   Constitutional:       General: He is not in acute distress.     Appearance: Normal appearance. He is ill-appearing.   HENT:      Head: Normocephalic and atraumatic.      Nose: Nose normal.      Mouth/Throat:      Mouth: Mucous membranes are moist.   Eyes:      Extraocular Movements: Extraocular movements intact.   Cardiovascular:      Rate and Rhythm: Normal rate and regular rhythm.      Pulses: Normal pulses.      Heart sounds: No murmur heard.  Pulmonary:      Effort: Pulmonary effort is normal. No respiratory distress.   Abdominal:      General: Abdomen is flat.      Palpations: Abdomen is soft.      Tenderness: There is no abdominal tenderness.   Skin:     General: Skin is warm.      Capillary Refill: Capillary refill takes less than 2 seconds.   Neurological: "      Mental Status: He is alert.      Comments: Alert, oriented, but poor insight into his medical conditions.  Mild diffuse weakness.  PT notes he can take a few steps around his bed, but are not confident he is able to navigate a typical inpatient psych unit.   Psychiatric:      Comments: Today very labile, agitated and aggressive.  Quickly began yelling and cussing during my visit.  Would not answer most questions.  Family states that within the last week he has said several times he wishes to kill himself.             Significant Labs: All pertinent labs within the past 24 hours have been reviewed.    Significant Imaging: I have reviewed all pertinent imaging results/findings within the past 24 hours.

## 2023-08-17 NOTE — PT/OT/SLP PROGRESS
Occupational Therapy   Treatment    Name: Cali Mabry  MRN: [id number]  Admitting Diagnosis:  Stroke       Recommendations:     Discharge Recommendations:  (per chart, pt to d/c to IP psych facility)  Discharge Equipment Recommendations:  none  Barriers to discharge:   (pt will continue to require supervision d/t poor safety awareness)    Assessment:     Cali Mabry is a 57 y.o. male with a medical diagnosis of Stroke. Performance deficits affecting function are decreased safety awareness, impaired self care skills, impaired functional mobility, impaired balance, visual deficits.     Rehab Prognosis:  Fair; patient would benefit from acute skilled OT services to address these deficits and reach maximum level of function.       Plan:     Patient to be seen 3 x/week to address the above listed problems via self-care/home management, therapeutic activities, therapeutic exercises  Plan of Care Expires: 08/30/23  Plan of Care Reviewed with: patient    Subjective     Chief Complaint: refused gait belt despite edu on hospital policy   Patient/Family Comments/goals: felt alright and agreeable to ambulate in the hallway, but after ADLs, pt became aggressive so OT had to stop session.      Pain/Comfort:  Pain Rating 1: 0/10    Objective:     Communicated with: nurse, Becca, prior to session.  Patient found up in chair asleep with  (safety sitter) upon OT entry to room.    General Precautions: Standard, fall (PEC)    Orthopedic Precautions:N/A  Braces: N/A  Respiratory Status: Room air     Occupational Performance:     Pt asleep upon arrival, but calm when awoken. Pt adamantly refused gait belt, but still was calm. Pt agreed to CGA by OT for safety during session and OT explained goals for session (ADLs then walk in the hallway). Pt became frustrated when OT and safety sitter explained hospital policy rules with bathroom safety/not being left unattended despite OT standing behind pt. Pt was urinating, however,  missing the commode and cursing expletives. Pt then exited the bathroom and hit the bathroom door with his R fist yelling expletives in frustration of wanting to get out of here and going to the bathroom alone. Despite his anger and walking towards the room to exit, he did listen when told to sit at the EOB and he stopped yelling. Charge nurses present. Pt laid himself down and was then quiet. Safety sitter still at bedside.     Functional Mobility/Transfers:  Patient completed Sit <> Stand Transfer with contact guard assistance  with  hand-held assist   Functional Mobility: pt initially completed ~6 ft functional mobility with CGA and hand-held assistance then advanced to SBA-SUP without hand-held assistance. No LOB noted.     Activities of Daily Living:  Grooming: stand by assistance standing at the sink to comb hair and use mouthwash   Toileting: stand by assistance standing at the toilet to urinate. Pt missed the toilet mostly with urine on the floor; pt required SBA-CGA d/t poor safety awareness       [facility] 6 Click ADL: 22    Treatment & Education:  Pt re-educated on OT role/POC.   Importance of OOB activity with staff assistance.  Safety during functional mobility. poor evidence of carryover.   Multiple self-care tasks/functional mobility completed- assistance level noted above   All questions/concerns answered within OT scope of practice       Patient left HOB elevated with all lines intact, call button in reach, safety sitter present, and nurse, Becca, updated; door left open.     GOALS:   Multidisciplinary Problems       Occupational Therapy Goals          Problem: Occupational Therapy    Goal Priority Disciplines Outcome Interventions   Occupational Therapy Goal     OT, PT/OT Ongoing, Progressing    Description: Goals to be met by: 08/30/23     Patient will increase functional independence with ADLs by performing:    LE Dressing with Supervision.  Grooming while standing at sink with  Supervision.  Toileting from toilet with Supervision for hygiene and clothing management.   Supine to sit with Supervision.  Step transfer with Supervision  Toilet transfer to toilet with Supervision.  Upper extremity exercise program x15 reps per handout, with independence.                         Time Tracking:     OT Date of Treatment: 08/17/23  OT Start Time: 1353  OT Stop Time: 1401  OT Total Time (min): 8 min    Billable Minutes:Self Care/Home Management 8 min     OT/LEONCIO: OT     Number of LEONCIO visits since last OT visit: 0    8/17/2023

## 2023-08-17 NOTE — ASSESSMENT & PLAN NOTE
-BP normal to moderately elevated  -Given subacute nature of his stroke - pursuing normotension  -No BP meds on home list  -Given CAD history added metoprolol 8/16 - continue for now

## 2023-08-17 NOTE — ASSESSMENT & PLAN NOTE
-Noted agitation, combativeness and grandiosity of thoughts on admit  -Seen by tele-psych in ER and given provisional diagnosis of Ila and r/o mood disorder  -Placed under PEC and recommended inpatient psych placement once medically stable  -Continues today with aggression, agitation and combativeness.  Strongly suspect substance induced mood disorder  -He needs inpatient psychiatric care, but we are not yet confident of his ability to ambulate in that environment.  If no medical psych beds available, will need to keep him here until we are confident of his ability to safely ambulate around a typical psych unit.  -Will re-consult tele-psych asking for recommendations beyond treatment of severe agitation.  -OK to use Geodon or Zyprexa PO/IM q 6 hours prn severe agitation   -Continue home seroquel qhs

## 2023-08-17 NOTE — NURSING
Report received from night nurse RITA Theodore. Visualized patient and assessed patient's overall condition and appearance. No acute distress noted. Will continue to monitor

## 2023-08-18 LAB
POCT GLUCOSE: 124 MG/DL (ref 70–110)
POCT GLUCOSE: 127 MG/DL (ref 70–110)
POCT GLUCOSE: 180 MG/DL (ref 70–110)
POCT GLUCOSE: >500 MG/DL (ref 70–110)

## 2023-08-18 PROCEDURE — 25000003 PHARM REV CODE 250: Performed by: HOSPITALIST

## 2023-08-18 PROCEDURE — G0426 PR INPT TELEHEALTH CONSULT 50M: ICD-10-PCS | Mod: GT,,, | Performed by: STUDENT IN AN ORGANIZED HEALTH CARE EDUCATION/TRAINING PROGRAM

## 2023-08-18 PROCEDURE — 94761 N-INVAS EAR/PLS OXIMETRY MLT: CPT

## 2023-08-18 PROCEDURE — 21400001 HC TELEMETRY ROOM

## 2023-08-18 PROCEDURE — G0426 INPT/ED TELECONSULT50: HCPCS | Mod: GT,,, | Performed by: STUDENT IN AN ORGANIZED HEALTH CARE EDUCATION/TRAINING PROGRAM

## 2023-08-18 PROCEDURE — 99900035 HC TECH TIME PER 15 MIN (STAT)

## 2023-08-18 PROCEDURE — 63600175 PHARM REV CODE 636 W HCPCS: Performed by: HOSPITALIST

## 2023-08-18 PROCEDURE — 25000003 PHARM REV CODE 250: Performed by: STUDENT IN AN ORGANIZED HEALTH CARE EDUCATION/TRAINING PROGRAM

## 2023-08-18 RX ORDER — LORAZEPAM 2 MG/ML
1 INJECTION INTRAMUSCULAR EVERY 4 HOURS PRN
Status: DISCONTINUED | OUTPATIENT
Start: 2023-08-18 | End: 2023-08-20

## 2023-08-18 RX ADMIN — INSULIN DETEMIR 20 UNITS: 100 INJECTION, SOLUTION SUBCUTANEOUS at 08:08

## 2023-08-18 RX ADMIN — ACETAMINOPHEN 650 MG: 325 TABLET ORAL at 09:08

## 2023-08-18 RX ADMIN — ACETAMINOPHEN 650 MG: 325 TABLET ORAL at 04:08

## 2023-08-18 RX ADMIN — INSULIN ASPART 7 UNITS: 100 INJECTION, SOLUTION INTRAVENOUS; SUBCUTANEOUS at 04:08

## 2023-08-18 RX ADMIN — ENOXAPARIN SODIUM 40 MG: 40 INJECTION SUBCUTANEOUS at 04:08

## 2023-08-18 RX ADMIN — INSULIN ASPART 7 UNITS: 100 INJECTION, SOLUTION INTRAVENOUS; SUBCUTANEOUS at 08:08

## 2023-08-18 RX ADMIN — QUETIAPINE 100 MG: 100 TABLET ORAL at 09:08

## 2023-08-18 RX ADMIN — INSULIN ASPART 10 UNITS: 100 INJECTION, SOLUTION INTRAVENOUS; SUBCUTANEOUS at 08:08

## 2023-08-18 NOTE — NURSING
Observed patient had been urinating on self,  chair, and floor. Intervened with help of other staff to get patient out of soiled linens and clothes. Patient refused to move and was uncooperative, agitated, cursing at staff. Security called to provide assistance. Patient cursed at staff and attempted to hit staff while transferred to bed via staff.

## 2023-08-18 NOTE — PT/OT/SLP PROGRESS
Physical Therapy      Patient Name:  Cali Mabry   MRN:  [id number]    Patient not seen today secondary to Nursing care with 4 CNAs present. Pt is agitated and combative . Will follow-up as appropriate  .

## 2023-08-18 NOTE — PLAN OF CARE
Problem: Adult Inpatient Plan of Care  Goal: Absence of Hospital-Acquired Illness or Injury  Outcome: Ongoing, Progressing  Intervention: Identify and Manage Fall Risk  Flowsheets (Taken 8/18/2023 1838)  Safety Promotion/Fall Prevention:   assistive device/personal item within reach   bed alarm set   Fall Risk reviewed with patient/family   Fall Risk signage in place   instructed to call staff for mobility   Supervised toileting - stay within arms reach   side rails raised x 2   room near unit station   nonskid shoes/socks when out of bed  Intervention: Prevent Skin Injury  Flowsheets (Taken 8/18/2023 1838)  Body Position: position changed independently  Skin Protection:   adhesive use limited   tubing/devices free from skin contact  Intervention: Prevent and Manage VTE (Venous Thromboembolism) Risk  Flowsheets (Taken 8/18/2023 1838)  Activity Management:   Arm raise - L1   Rolling - L1  VTE Prevention/Management:   ROM (passive) performed   ROM (active) performed   fluids promoted  Range of Motion:   active ROM (range of motion) encouraged   ROM (range of motion) performed     Problem: Adjustment to Illness (Stroke, Ischemic/Transient Ischemic Attack)  Goal: Optimal Coping  Outcome: Ongoing, Progressing  Intervention: Support Psychosocial Response to Stroke  Flowsheets (Taken 8/18/2023 1838)  Supportive Measures:   positive reinforcement provided   self-care encouraged     Problem: Adult Inpatient Plan of Care  Goal: Plan of Care Review  Outcome: Ongoing, Not Progressing  Flowsheets (Taken 8/18/2023 1838)  Plan of Care Reviewed With: patient

## 2023-08-18 NOTE — PLAN OF CARE
Problem: Cerebral Tissue Perfusion (Stroke, Ischemic/Transient Ischemic Attack)  Goal: Optimal Cerebral Tissue Perfusion  Outcome: Ongoing, Progressing  Intervention: Protect and Optimize Cerebral Perfusion  Flowsheets (Taken 8/18/2023 0645)  Sensory Stimulation Regulation:   auditory stimulation minimized   care clustered   quiet environment promoted   television on     Problem: Cognitive Impairment (Stroke, Ischemic/Transient Ischemic Attack)  Goal: Optimal Cognitive Function  Outcome: Ongoing, Progressing  Intervention: Optimize Cognitive Function  Flowsheets (Taken 8/18/2023 0645)  Sensory Stimulation Regulation:   auditory stimulation minimized   care clustered   quiet environment promoted   television on     Problem: Fall Injury Risk  Goal: Absence of Fall and Fall-Related Injury  Outcome: Ongoing, Progressing  Intervention: Identify and Manage Contributors  Flowsheets (Taken 8/18/2023 0645)  Medication Review/Management: medications reviewed

## 2023-08-18 NOTE — PLAN OF CARE
Problem: Adult Inpatient Plan of Care  Goal: Plan of Care Review  Outcome: Ongoing, Progressing  Goal: Optimal Comfort and Wellbeing  Outcome: Ongoing, Progressing  Intervention: Monitor Pain and Promote Comfort  Flowsheets (Taken 8/17/2023 1927)  Pain Management Interventions:   care clustered   pillow support provided   position adjusted

## 2023-08-18 NOTE — ASSESSMENT & PLAN NOTE
-Last A1c 10.4 1/27/23  -A1c now 9.5  -Blood sugars severely elevated again today   -Refuses labs  -Give additional 15 units aspart insulin this morning  -Continue home insulin regimen: Detemir 20 units qd and lispro 7 units tidwm  -Continue moderate dose SSI ac/hs

## 2023-08-18 NOTE — NURSING
Bedside report given to night nurse RITA Theodore. Walking rounds completed. Visualized and assessed patient. NAD noted. Safety precautions maintained and call light within reach.    Chart check completed.

## 2023-08-18 NOTE — NURSING
"Patient agitated and confused. Patient refused 4 eyes. Unable to complete shift assessment. Patient verbally abusive and cursing. Patient stated "give the f**jeff tylenol" and "give me the f**jeff insulin".   "

## 2023-08-18 NOTE — ASSESSMENT & PLAN NOTE
-Noted agitation, combativeness and grandiosity of thoughts on admit  -Seen by tele-psych in ER and given provisional diagnosis of Ila and r/o mood disorder  -Placed under PEC and recommended inpatient psych placement once medically stable  -Continues with aggression, agitation and combativeness.  Strongly suspect substance induced mood disorder  -He needs inpatient psychiatric care, but we are not yet confident of his ability to ambulate in that environment.  If no medical psych beds available, will need to keep him here until we are confident of his ability to safely ambulate around a typical psych unit.  -Await re-consult to tele-psych asking for recommendations beyond treatment of severe agitation.  -OK to use Geodon or Zyprexa PO/IM q 6 hours prn severe agitation   -Continue home seroquel qhs

## 2023-08-18 NOTE — CONSULTS
"[facility]  Psychiatry  Telepsychiatry Consult Note    Please see previous notes:    Patient agreeable to consultation via telepsychiatry.    Tele-Consultation from Psychiatry started: 8/18/2023 at 10:53PM  The chief complaint leading to psychiatric consultation is: psychiatric evaluation for suspected SIMD, aggressive and labile, medication recommendations  This consultation was requested by Dr. Cullen Jackman, the attending physician.  The location of the consulting psychiatrist is [place], [place].  The patient location is  [facility] TELEMETRY     Also present with the patient at the time of the consultation: nurse/sitter    Patient Identification:   Cali Mabry is a 57 y.o. male.    Patient information was obtained from patient, past medical records, ER records, and primary team.    Inpatient consult to Telemedicine - Psych  Consult performed by: Brenda Campbell MD  Consult ordered by: Cullen Jackman MD  Reason for consult: psychiatric evaluation for substance induced mood disorder        Consult Start Time: 08/18/2023 10:53 CDT  Consult End Time: 08/18/2023 11:50 CDT        Subjective:     History of Present Illness:  Mr. Cali Mabry is a 57 y.o. gentleman with a history of Type 1 Diabetes, COPD (no on O2), CAD, HTN, HLD, substance use, mood disorder per chart review.    Per Hospital Medicine H&P: EMS was called by patient who stated that he fell a week ago and has had a HA and loss of vision since that fall.    "Patient had multiple ED presentations for headaches, blurry vision, eye pain this month.  On 08/02, he presented to [facility] and had a CT head that showed a new left cerebellar infarct, however left against medical advice.  He returned to  ED on 8/7 and to [facility] ED for worsening vision loss, however left AMA.  He had a brief hospitalization to  on 8/9 for DKA after presenting with similar symptoms as well.  Patient is known to be verbally abusive to staff.     In the ED, the patient " "was hypertensive.  Labs were remarkable for hypercholesterolemia (297).  UDS was positive for amphetamines and benzodiazepines.  CTA head showed Cut off involving a part of the P2 segment of the right PCA,  subacute to chronic infarct involving the right medial temporal lobe in the right occipital lobe & remote right pontine and left lateral cerebellar hemisphere infarcts.  Vascular Neurology was consulted and stated patient can stay at [facility].  Psychiatry was consulted and recommended pec giving grave disability.  Patient was given aspirin 325 mg and was admitted for further management."    Per nursing notes this morning, patient was observed urinating on himself, chair and floor and combative when attempted to be cleaned by staff. Security was called to bedside.    On interview:  Patient is sleeping with sitter at bedside. He is arousable but refuses to participate in the conversation. His nurse says that this is her first day with him and that he can be very aggressive, impulsive, combative. His mother was here in the morning, per the nurse, and he did talk with her but it appeared that her visit agitated him.He was cursing at staff during the interview, refused to have his vitals drawn.    He denied SI. He denied HI. He continues to be irritable and profane in his speech. He asked providers multiple times to leave him alone. He says that he hears voices but it was unclear if he was having visual hallucinations or if he was having tactile hallucinations. He refused to answer all other questions.    Psychiatric History:   Unable to assess due to patient's refusal to cooperate    Substance Abuse History:  Unable to assess due to patient's refusal to participate.  UDS this admission positive for benzodiazepines and amphetamines  Per chart review h/o IVDU (heroin) and methamphetamine use    Legal History: Past charges/incarcerations: unable to assess     Family Psychiatric History: unable to assess/verify      Social " "History:  Unable to assess due to nonparticipatory nature of patient    Psychiatric Mental Status Exam:  Arousal: sleeping but able to be aroused  Sensorium/Orientation: oriented to person only - could not answer vs would not answer other orientation questions  Behavior/Cooperation: agitated   Speech: spontaneous, fluent, profane  Language: grossly intact  Mood: " Leave me alone! "   Affect: angry  Thought Process: simple, linear, goal-directed  Thought Content:   Auditory hallucinations: "YES" but did not elaborate  Visual hallucinations: did not answer  Paranoia: did not answer  Delusions:  did not answer  Suicidal ideation: DENIES  Homicidal ideation: DENIES  Attention/Concentration: impaired  Memory:    Recent:  unable to assess   Remote: unable to assess    Fund of Knowledge: unable to assess   Abstract reasoning: unable to assess  Insight: poor awareness of illness  Judgment: poor    Current Outpatient Medications   Medication Instructions    albuterol (PROVENTIL/VENTOLIN HFA) 90 mcg/actuation inhaler 1-2 puffs, Inhalation, Every 6 hours PRN, Rescue    aspirin 81 mg, Oral, Daily    atorvastatin (LIPITOR) 20 mg, Oral, Daily    cetirizine (ZYRTEC) 10 mg, Oral, Daily    gabapentin (NEURONTIN) 300 mg, Oral, 2 times daily    insulin detemir U-100 (Levemir) 20 Units, Subcutaneous, Nightly    insulin lispro 7 Units, Subcutaneous, 3 times daily with meals    LANTUS SOLOSTAR U-100 INSULIN glargine 100 units/mL SubQ pen SMARTSI Unit(s) SUB-Q Every Night    levocetirizine (XYZAL) 5 mg, Oral, Nightly    loratadine (CLARITIN) 10 mg, Oral    naloxone (NARCAN) 4 mg/actuation Spry 4mg by nasal route as needed for opioid overdose; may repeat every 2-3 minutes in alternating nostrils until medical help arrives. Call 911    nitroGLYCERIN (NITROSTAT) 0.4 mg, Sublingual, Every 5 min PRN    ONETOUCH DELICA PLUS LANCET 33 gauge Misc Topical (Top), 4 times daily    ONETOUCH ULTRA TEST Strp USE TO TEST BLOOD SUGAR FOUR TIMES DAILY "    ONETOUCH ULTRA2 METER Misc 4 times daily    QUEtiapine (SEROQUEL) 100 mg, Oral, Nightly    sertraline (ZOLOFT) 25 mg, Oral, 2 times daily      Past Medical History:   Past Medical History:   Diagnosis Date    Acute on chronic pancreatitis 04/03/2016    Addiction to drug     CHF (congestive heart failure)     DKA (diabetic ketoacidoses) 02/2020    DM (diabetes mellitus), type 1     HCV (hepatitis C virus)     high VL     HTN (hypertension)     Hx of psychiatric care     Hypomagnesemia 05/25/2020    IVDU (intravenous drug user)     Heroin    Pancreatitis     Psychiatric problem     Substance abuse       Past Surgical History:   Procedure Laterality Date    CARDIAC SURGERY  1999    stent placed. ([facility])    ESOPHAGOGASTRODUODENOSCOPY N/A 3/5/2020    Gastritis.  No H pylori.  Completed PPI for 1 month.  Procedure: EGD (ESOPHAGOGASTRODUODENOSCOPY);  Surgeon: Brinda Rene MD;  Location: [facility] ENDO;  Service: Endoscopy;  Laterality: N/A;  [id number] A; [id number]    LEFT HEART CATHETERIZATION Left 9/28/2020    Procedure: Left heart cath;  Surgeon: Fito Rangel MD;  Location: [facility] CATH LAB;  Service: Cardiology;  Laterality: Left;    SHOULDER SURGERY  2013    right shoulder, spur removal.     Laboratory Data:   Labs Reviewed   CBC W/ AUTO DIFFERENTIAL - Abnormal; Notable for the following components:       Result Value    Hemoglobin 12.8 (*)     MCH 25.9 (*)     MCHC 31.4 (*)     RDW 16.7 (*)     MPV 8.8 (*)     Immature Granulocytes 0.6 (*)     All other components within normal limits   COMPREHENSIVE METABOLIC PANEL - Abnormal; Notable for the following components:    Glucose 140 (*)     Total Protein 9.0 (*)     Alkaline Phosphatase 152 (*)     All other components within normal limits   TSH - Abnormal; Notable for the following components:    TSH 0.176 (*)     All other components within normal limits   LIPID PANEL - Abnormal; Notable for the following components:    Cholesterol 297 (*)     Triglycerides 691  (*)     HDL/Cholesterol Ratio 15.2 (*)     Total Cholesterol/HDL Ratio 6.6 (*)     All other components within normal limits   URINALYSIS, REFLEX TO URINE CULTURE - Abnormal; Notable for the following components:    Specific Gravity, UA >1.030 (*)     Protein, UA 2+ (*)     Glucose, UA 3+ (*)     All other components within normal limits    Narrative:     Specimen Source->Urine   DRUG SCREEN PANEL, URINE EMERGENCY - Abnormal; Notable for the following components:    Benzodiazepines Presumptive Positive (*)     Amphetamine Screen, Ur Presumptive Positive (*)     All other components within normal limits    Narrative:     Specimen Source->Urine   ACETAMINOPHEN LEVEL - Abnormal; Notable for the following components:    Acetaminophen (Tylenol), Serum <3.0 (*)     All other components within normal limits   CBC W/ AUTO DIFFERENTIAL - Abnormal; Notable for the following components:    RBC 4.41 (*)     Hemoglobin 11.4 (*)     Hematocrit 35.0 (*)     MCV 79 (*)     MCH 25.9 (*)     RDW 16.6 (*)     Immature Granulocytes 0.8 (*)     All other components within normal limits   COMPREHENSIVE METABOLIC PANEL - Abnormal; Notable for the following components:    Sodium 129 (*)     Potassium 5.2 (*)     Glucose 631 (*)     BUN 24 (*)     Albumin 3.0 (*)     All other components within normal limits    Narrative:     GLU critical result(s) called and verbal readback obtained from Christina Terry RN by [facility] 08/16/2023 07:37   BETA - HYDROXYBUTYRATE, SERUM - Abnormal; Notable for the following components:    Beta-Hydroxybutyrate 2.3 (*)     All other components within normal limits   BASIC METABOLIC PANEL - Abnormal; Notable for the following components:    Sodium 129 (*)     Glucose 632 (*)     BUN 26 (*)     All other components within normal limits    Narrative:       Glucose critical result(s) called and verbal readback obtained from   Vivian Terry. by [facility] 08/16/2023 08:52   POCT GLUCOSE - Abnormal; Notable for the following  components:    POCT Glucose >500 (*)     All other components within normal limits   POCT GLUCOSE - Abnormal; Notable for the following components:    POCT Glucose >500 (*)     All other components within normal limits   POCT GLUCOSE - Abnormal; Notable for the following components:    POCT Glucose >500 (*)     All other components within normal limits   POCT GLUCOSE - Abnormal; Notable for the following components:    POCT Glucose >500 (*)     All other components within normal limits   POCT GLUCOSE - Abnormal; Notable for the following components:    POCT Glucose 362 (*)     All other components within normal limits   PROTIME-INR   ALCOHOL,MEDICAL (ETHANOL)   ACETAMINOPHEN LEVEL   ALCOHOL,MEDICAL (ETHANOL)   T4, FREE   URINALYSIS MICROSCOPIC    Narrative:     Specimen Source->Urine   MAGNESIUM   HEMOGLOBIN A1C   MAGNESIUM   PHOSPHORUS   POCT GLUCOSE, HAND-HELD DEVICE   POCT GLUCOSE, HAND-HELD DEVICE   POCT GLUCOSE MONITORING CONTINUOUS   POCT GLUCOSE MONITORING CONTINUOUS       Neurological History:  Seizures: unable to assess  Head trauma: Yes, per history of presenting illness    Allergies:   Review of patient's allergies indicates:  No Known Allergies     aspirin  81 mg Oral Daily    atorvastatin  20 mg Oral Daily    clopidogreL  75 mg Oral Daily    enoxparin  40 mg Subcutaneous Q24H (prophylaxis, 1700)    insulin aspart U-100  1-10 Units Subcutaneous QID (WM & HS)    insulin aspart U-100  7 Units Subcutaneous TIDWM    insulin detemir U-100 (Levemir)  20 Units Subcutaneous Daily    metoprolol succinate  25 mg Oral Daily    mupirocin   Nasal BID    polyethylene glycol  17 g Oral Daily    QUEtiapine  100 mg Oral QHS      PRN meds:   acetaminophen    acetaminophen    albuterol-ipratropium    aluminum-magnesium hydroxide-simethicone    bisacodyL    dextrose 50%    dextrose 50%    glucagon (human recombinant)    glucagon (human recombinant)    glucose    glucose    glucose    glucose    insulin aspart U-100     labetaloL    magnesium oxide    magnesium oxide    melatonin    naloxone    OLANZapine    ondansetron    potassium bicarbonate    potassium bicarbonate    potassium bicarbonate    potassium, sodium phosphates    potassium, sodium phosphates    potassium, sodium phosphates    prochlorperazine    simethicone    sodium chloride 0.9%    sodium chloride 0.9%    sodium chloride 0.9%        No new subjective & objective note has been filed under this hospital service since the last note was generated.      Assessment - Diagnosis - Goals:     Diagnosis/Impression:   Stimulant Withdrawal  Rule out Delirium  Aggressive behavior  Stimulant use disorder  Benzodiazepine use disorder    Rec:  1) Continue with [facility] given patient's grave disability and aggressive outbursts towards staff members. Continue seeking inpatient behavioral health bed for transfer once medically cleared. He would benefit from inpatient substance use rehab program if he is amenable once stable and ready for discharge from psychiatric unit.  2) For nonredirectable agitation, combative behavior, recommend olanzapine 5mg SL/IM q8hrs PRN, monitor EKG (last QT-c interval 471msec)  3) Agree with Seroquel 100mg PO QHS for insomnia/psychosis. If patient is needing multiple PRNs of olanzapine for nonredirectable agitation, would recommend switching to Trazodone 100mg PO QHS to target insomnia.  4) q4 monitoring for CIWA given benzodiazepines in system and unclear usage on benzodiazepines leading up to hospitalization. Ativan 1mg q4hrs PRN CIWA >10. Please hold if olanzapine has been given within 2 hours and for RR <10    Time with patient: 57mins    More than 50% of the time was spent counseling/coordinating care    Consulting clinician was informed of the encounter and consult note.    Consultation ended: 8/18/2023 at 11:50AM    Brenda Campbell MD   Psychiatry  [facility]

## 2023-08-18 NOTE — NURSING
[facility], [facility]  Nurses Note -- 4 Eyes      8/18/2023       Skin assessed on: Q Shift      [x] No Pressure Injuries Present    []Prevention Measures Documented    [] Yes LDA  for Pressure Injury Previously documented     [] Yes New Pressure Injury Discovered   [] LDA for New Pressure Injury Added      Attending RN:  Tiny Gorman RN     Second RN:  RITA Theodore

## 2023-08-18 NOTE — PT/OT/SLP PROGRESS
Speech Language Pathology      Cali Mabry  MRN: [id number]    Patient not seen today secondary to pt refusal and not accepting of any po intake. ST recs cont current regular diet with thin liquids via cup-edge ONLY. ST will s/o at this time.      Erika Cervantes, CCC-SLP

## 2023-08-18 NOTE — NURSING
[facility]  [place]es [place] -- 4 Eyes    Patient refused 4 eyes assessment. Yelling when spoken to, wants to sleep      8/18/2023       Skin assessed on: Q Shift      [] No Pressure Injuries Present    []Prevention Measures Documented    [] Yes LDA  for Pressure Injury Previously documented     [] Yes New Pressure Injury Discovered   [] LDA for New Pressure Injury Added      Attending RN:  Radha Chavez LPN     Second RN:  Tiny SALINAS

## 2023-08-18 NOTE — PROGRESS NOTES
[facility] Medicine  Progress Note    Patient Name: Cali Mabry  MRN: [id number]  Patient Class: IP- Inpatient   Admission Date: 8/15/2023  Length of Stay: 3 days  Attending Physician: Cullen Jackman MD  Primary Care Provider: Lawanda Flores NP        Subjective:     Principal Problem:Stroke        HPI:  This is a 57-year-old male with a past medical history of type 1 diabetes, COPD (not on O2), CAD, hypertension, hyperlipidemia, substance use, mood disorder who presents with blurry vision.    Patient had multiple ED presentations for headaches, blurry vision, eye pain this month.  On 08/02, he presented to [facility] and had a CT head that showed a new left cerebellar infarct, however left against medical advice.  He returned to  ED on 8/7 and to [facility] ED for worsening vision loss, however left AMA.  He had a brief hospitalization to  on 8/9 for DKA after presenting with similar symptoms as well.  Patient is known to be verbally abusive to staff.    In the ED, the patient was hypertensive.  Labs were remarkable for hypercholesterolemia (297).  UDS was positive for amphetamines and benzodiazepines.  CTA head showed Cut off involving a part of the P2 segment of the right PCA,  subacute to chronic infarct involving the right medial temporal lobe in the right occipital lobe & remote right pontine and left lateral cerebellar hemisphere infarcts.  Vascular Neurology was consulted and stated patient can stay at [facility].  Psychiatry was consulted and recommended pec giving grave disability.  Patient was given aspirin 325 mg and was admitted for further management.      Overview/Hospital Course:  No notes on file    Interval History: No acute events overnight.  Very aggressive, yelling and agitated at time of my visit.  Loud cursing and not participating with care.  Denies pain.  Noted to be urinating all over the place.      Objective:     Vital Signs (Most Recent):  Temp: 98.3 °F (36.8 °C) (08/18/23  0741)  Pulse: 100 (08/18/23 0741)  Resp: 18 (08/18/23 0741)  BP: 136/69 (08/18/23 0741)  SpO2: 95 % (08/18/23 0741) Vital Signs (24h Range):  Temp:  [98.3 °F (36.8 °C)-98.8 °F (37.1 °C)] 98.3 °F (36.8 °C)  Pulse:  [] 100  Resp:  [18-20] 18  SpO2:  [95 %-96 %] 95 %  BP: (120-169)/(62-73) 136/69     Weight: 68 kg (149 lb 14.6 oz)  Body mass index is 22.79 kg/m².  No intake or output data in the 24 hours ending 08/18/23 1108        Physical Exam  Vitals and nursing note reviewed.   Constitutional:       General: He is not in acute distress.     Appearance: Normal appearance. He is not ill-appearing.   HENT:      Head: Normocephalic and atraumatic.      Nose: Nose normal.      Mouth/Throat:      Mouth: Mucous membranes are moist.   Eyes:      Extraocular Movements: Extraocular movements intact.   Cardiovascular:      Rate and Rhythm: Normal rate and regular rhythm.      Pulses: Normal pulses.      Heart sounds: No murmur heard.  Pulmonary:      Effort: Pulmonary effort is normal. No respiratory distress.   Abdominal:      General: Abdomen is flat.      Palpations: Abdomen is soft.      Tenderness: There is no abdominal tenderness.   Skin:     General: Skin is warm.      Capillary Refill: Capillary refill takes less than 2 seconds.   Neurological:      Mental Status: He is alert.      Comments: Alert, does not answer orienting questions or participate with neurological evaluation   Psychiatric:      Comments: Remains very labile, agitated and aggressive.  Quickly began yelling and cussing during my visit.  Would not answer most questions.  Family states that within the last week he has said several times he wishes to kill himself.             Significant Labs: All pertinent labs within the past 24 hours have been reviewed.    Significant Imaging: I have reviewed all pertinent imaging results/findings within the past 24 hours.        Assessment/Plan:      * Stroke  -Admitted to inpatient status  -Presented for  evaluation of backwards fall a week ago and vision changes since then  -Noted CT head (8/2) showing a new left cerebellar infarct.  Patient left AMA at that time  -In ER this admit, CTA head showed cut off involving a part of the P2 segment of the right PCA, subacute to chronic infarct involving the right medial temporal lobe in the right occipital lobe & remote right pontine and left lateral cerebellar hemisphere infarcts.    -Suspect related to substance abuse - noted amphetamines in UDS  -A1c 9.5   , HDL 45,   LDL measurement invalid  -Echo showedEF 55-60%, diastolic dysfunction, negative bubble study.  -CUS showed 50-69% stenosis of the right ICA and 1-49% stenosis of the left ICA  -Consulted tele-neurology, PT, OT and SLP - input appreciated  -PT/OT recommend HH with PT, OT and SLP at discharge.  -Clinically improving - states vision symptoms have resolved.  Technically he is medically stable for discharge from hospital, however he needs inpatient psychiatric hospitalization and we are not quite confident he can safely navigate in that environment yet  -Continue DAPT (asa 81 + plavix 75) x 21 days and then stop aspirin.  -Continue statin  -Continue to pursue normotension  -Discharge pending ability to safely navigate around psych unit or placement in a medical psych unit.    Polysubstance use disorder  -Urine drug screen positive for amphetamines and benzodiazepines  -Patient denies use  -Likely contributor to medical illness as well as mood alterations and combativeness.  -Counseled on cessation    Noncompliance with medication regimen  -Noted history as well as illicit substance use and prior AMA  -Counseled     Substance induced mood disorder  -Noted agitation, combativeness and grandiosity of thoughts on admit  -Seen by tele-psych in ER and given provisional diagnosis of Ila and r/o mood disorder  -Placed under [facility] and recommended inpatient psych placement once medically stable  -Continues  with aggression, agitation and combativeness.  Strongly suspect substance induced mood disorder  -He needs inpatient psychiatric care, but we are not yet confident of his ability to ambulate in that environment.  If no medical psych beds available, will need to keep him here until we are confident of his ability to safely ambulate around a typical psych unit.  -Await re-consult to tele-psych asking for recommendations beyond treatment of severe agitation.  -OK to use Geodon or Zyprexa PO/IM q 6 hours prn severe agitation   -Continue home seroquel qhs    Type 1 diabetes  -Last A1c 10.4 1/27/23  -A1c now 9.5  -Blood sugars severely elevated again today   -Refuses labs  -Give additional 15 units aspart insulin this morning  -Continue home insulin regimen: Detemir 20 units qd and lispro 7 units tidwm  -Continue moderate dose SSI ac/hs    CAD (coronary artery disease)  -Denies chest pain  -Continue aspirin and statin  -Monitor on telemetry    COPD (chronic obstructive pulmonary disease)  -No wheezing at this time  -P.r.nKevin Cam.    Hyperlipidemia  -Continue statin.    Primary hypertension  -BP normal to moderately elevated  -Given subacute nature of his stroke - pursuing normotension  -No BP meds on home list  -Given CAD history added metoprolol 8/16 - continue for now      VTE Risk Mitigation (From admission, onward)         Ordered     enoxaparin injection 40 mg  Every 24 hours         08/16/23 1155     IP VTE LOW RISK PATIENT  Once         08/16/23 0025     Place sequential compression device  Until discontinued         08/16/23 0025                Discharge Planning   ROLA:      Code Status: Full Code   Is the patient medically ready for discharge?:     Reason for patient still in hospital (select all that apply): Treatment and Pending disposition  Discharge Plan A: [facility]                  Cullen Jackman MD  Department of Hospital Medicine   [facility] - Telemetry

## 2023-08-18 NOTE — SUBJECTIVE & OBJECTIVE
Interval History: No acute events overnight.  Very aggressive, yelling and agitated at time of my visit.  Loud cursing and not participating with care.  Denies pain.  Noted to be urinating all over the place.      Objective:     Vital Signs (Most Recent):  Temp: 98.3 °F (36.8 °C) (08/18/23 0741)  Pulse: 100 (08/18/23 0741)  Resp: 18 (08/18/23 0741)  BP: 136/69 (08/18/23 0741)  SpO2: 95 % (08/18/23 0741) Vital Signs (24h Range):  Temp:  [98.3 °F (36.8 °C)-98.8 °F (37.1 °C)] 98.3 °F (36.8 °C)  Pulse:  [] 100  Resp:  [18-20] 18  SpO2:  [95 %-96 %] 95 %  BP: (120-169)/(62-73) 136/69     Weight: 68 kg (149 lb 14.6 oz)  Body mass index is 22.79 kg/m².  No intake or output data in the 24 hours ending 08/18/23 1108        Physical Exam  Vitals and nursing note reviewed.   Constitutional:       General: He is not in acute distress.     Appearance: Normal appearance. He is not ill-appearing.   HENT:      Head: Normocephalic and atraumatic.      Nose: Nose normal.      Mouth/Throat:      Mouth: Mucous membranes are moist.   Eyes:      Extraocular Movements: Extraocular movements intact.   Cardiovascular:      Rate and Rhythm: Normal rate and regular rhythm.      Pulses: Normal pulses.      Heart sounds: No murmur heard.  Pulmonary:      Effort: Pulmonary effort is normal. No respiratory distress.   Abdominal:      General: Abdomen is flat.      Palpations: Abdomen is soft.      Tenderness: There is no abdominal tenderness.   Skin:     General: Skin is warm.      Capillary Refill: Capillary refill takes less than 2 seconds.   Neurological:      Mental Status: He is alert.      Comments: Alert, does not answer orienting questions or participate with neurological evaluation   Psychiatric:      Comments: Remains very labile, agitated and aggressive.  Quickly began yelling and cussing during my visit.  Would not answer most questions.  Family states that within the last week he has said several times he wishes to kill  himself.             Significant Labs: All pertinent labs within the past 24 hours have been reviewed.    Significant Imaging: I have reviewed all pertinent imaging results/findings within the past 24 hours.

## 2023-08-18 NOTE — PT/OT/SLP PROGRESS
Occupational Therapy      Patient Name:  Cali Mabry   MRN:  [id number]    Patient not seen today secondary to: nursing care with x4 CNAs present. Will follow-up later as able.     8/18/2023

## 2023-08-19 PROBLEM — F63.9 IMPULSE CONTROL DISORDER IN ADULT: Status: ACTIVE | Noted: 2023-08-19

## 2023-08-19 LAB
POCT GLUCOSE: 169 MG/DL (ref 70–110)
POCT GLUCOSE: 197 MG/DL (ref 70–110)
POCT GLUCOSE: 444 MG/DL (ref 70–110)

## 2023-08-19 PROCEDURE — 63600175 PHARM REV CODE 636 W HCPCS: Performed by: HOSPITALIST

## 2023-08-19 PROCEDURE — S0166 INJ OLANZAPINE 2.5MG: HCPCS | Performed by: STUDENT IN AN ORGANIZED HEALTH CARE EDUCATION/TRAINING PROGRAM

## 2023-08-19 PROCEDURE — 97110 THERAPEUTIC EXERCISES: CPT | Mod: CQ

## 2023-08-19 PROCEDURE — 97110 THERAPEUTIC EXERCISES: CPT

## 2023-08-19 PROCEDURE — G0427 INPT/ED TELECONSULT70: HCPCS | Mod: GT,,, | Performed by: PSYCHIATRY & NEUROLOGY

## 2023-08-19 PROCEDURE — 97535 SELF CARE MNGMENT TRAINING: CPT

## 2023-08-19 PROCEDURE — 97116 GAIT TRAINING THERAPY: CPT | Mod: CQ

## 2023-08-19 PROCEDURE — 21400001 HC TELEMETRY ROOM

## 2023-08-19 PROCEDURE — 25000003 PHARM REV CODE 250: Performed by: STUDENT IN AN ORGANIZED HEALTH CARE EDUCATION/TRAINING PROGRAM

## 2023-08-19 PROCEDURE — 94760 N-INVAS EAR/PLS OXIMETRY 1: CPT

## 2023-08-19 PROCEDURE — G0427 PR INPT TELEHEALTH CON 70/>M: ICD-10-PCS | Mod: GT,,, | Performed by: PSYCHIATRY & NEUROLOGY

## 2023-08-19 PROCEDURE — 25000003 PHARM REV CODE 250: Performed by: HOSPITALIST

## 2023-08-19 RX ADMIN — CLOPIDOGREL BISULFATE 75 MG: 75 TABLET, FILM COATED ORAL at 09:08

## 2023-08-19 RX ADMIN — METOPROLOL SUCCINATE 25 MG: 25 TABLET, EXTENDED RELEASE ORAL at 09:08

## 2023-08-19 RX ADMIN — INSULIN ASPART 7 UNITS: 100 INJECTION, SOLUTION INTRAVENOUS; SUBCUTANEOUS at 07:08

## 2023-08-19 RX ADMIN — INSULIN DETEMIR 5 UNITS: 100 INJECTION, SOLUTION SUBCUTANEOUS at 12:08

## 2023-08-19 RX ADMIN — ASPIRIN 81 MG CHEWABLE TABLET 81 MG: 81 TABLET CHEWABLE at 09:08

## 2023-08-19 RX ADMIN — INSULIN ASPART 7 UNITS: 100 INJECTION, SOLUTION INTRAVENOUS; SUBCUTANEOUS at 11:08

## 2023-08-19 RX ADMIN — INSULIN ASPART 1 UNITS: 100 INJECTION, SOLUTION INTRAVENOUS; SUBCUTANEOUS at 09:08

## 2023-08-19 RX ADMIN — INSULIN DETEMIR 20 UNITS: 100 INJECTION, SOLUTION SUBCUTANEOUS at 08:08

## 2023-08-19 RX ADMIN — ACETAMINOPHEN 650 MG: 325 TABLET ORAL at 03:08

## 2023-08-19 RX ADMIN — ATORVASTATIN CALCIUM 20 MG: 10 TABLET, FILM COATED ORAL at 09:08

## 2023-08-19 RX ADMIN — Medication 6 MG: at 09:08

## 2023-08-19 RX ADMIN — INSULIN ASPART 2 UNITS: 100 INJECTION, SOLUTION INTRAVENOUS; SUBCUTANEOUS at 07:08

## 2023-08-19 RX ADMIN — INSULIN ASPART 7 UNITS: 100 INJECTION, SOLUTION INTRAVENOUS; SUBCUTANEOUS at 04:08

## 2023-08-19 RX ADMIN — INSULIN ASPART 10 UNITS: 100 INJECTION, SOLUTION INTRAVENOUS; SUBCUTANEOUS at 11:08

## 2023-08-19 RX ADMIN — ENOXAPARIN SODIUM 40 MG: 40 INJECTION SUBCUTANEOUS at 04:08

## 2023-08-19 RX ADMIN — OLANZAPINE 5 MG: 10 INJECTION, POWDER, FOR SOLUTION INTRAMUSCULAR at 07:08

## 2023-08-19 RX ADMIN — ACETAMINOPHEN 650 MG: 325 TABLET ORAL at 09:08

## 2023-08-19 RX ADMIN — ACETAMINOPHEN 650 MG: 325 TABLET ORAL at 10:08

## 2023-08-19 NOTE — ASSESSMENT & PLAN NOTE
-Noted agitation, combativeness and grandiosity of thoughts on admit  -Seen by tele-psych in ER and given provisional diagnosis of Ila and r/o mood disorder  -Placed under PEC and recommended inpatient psych placement once medically stable  -Up until today he has been very aggressive, agitated and combative.  Still not honest about his vision symptoms and not participatory with full exam.  However, no aggression, severe agitation or combativeness during my visit today.  Appears to have a thinly veiled control of his emotional outbursts today.  Strongly suspect substance induced mood disorder  -He needs inpatient psychiatric care, but we are not confident of his ability to ambulate safely in that environment.  If no medical psych beds available, will need to keep him here until we are confident of his ability to safely ambulate around a typical psych unit - or alternatively have psychiatric clearance to lift PEC/CEC and discharge home with HH.  -Continue monitoring CIWA q4h given benzos in his system on admit.  Thus far no need for lorazepam PRN CIWA > 10.  -Changed seroquel to trazodone 100mg qhs 8/18 - continue for now  -OK to use Geodon or Zyprexa PO/IM q 6 hours prn severe agitation - last given on 8/17.  -Re-consult tele-psych today - would mood stabilizing medications be helpful?

## 2023-08-19 NOTE — PROGRESS NOTES
[facility] Medicine  Progress Note    Patient Name: Cali Mabry  MRN: [id number]  Patient Class: IP- Inpatient   Admission Date: 8/15/2023  Length of Stay: 4 days  Attending Physician: Cullen Jackman MD  Primary Care Provider: Lawanda Flores NP        Subjective:     Principal Problem:Stroke        HPI:  This is a 57-year-old male with a past medical history of type 1 diabetes, COPD (not on O2), CAD, hypertension, hyperlipidemia, substance use, mood disorder who presents with blurry vision.    Patient had multiple ED presentations for headaches, blurry vision, eye pain this month.  On 08/02, he presented to [facility] and had a CT head that showed a new left cerebellar infarct, however left against medical advice.  He returned to  ED on 8/7 and to [facility] ED for worsening vision loss, however left AMA.  He had a brief hospitalization to  on 8/9 for DKA after presenting with similar symptoms as well.  Patient is known to be verbally abusive to staff.    In the ED, the patient was hypertensive.  Labs were remarkable for hypercholesterolemia (297).  UDS was positive for amphetamines and benzodiazepines.  CTA head showed Cut off involving a part of the P2 segment of the right PCA,  subacute to chronic infarct involving the right medial temporal lobe in the right occipital lobe & remote right pontine and left lateral cerebellar hemisphere infarcts.  Vascular Neurology was consulted and stated patient can stay at [facility].  Psychiatry was consulted and recommended pec giving grave disability.  Patient was given aspirin 325 mg and was admitted for further management.      Overview/Hospital Course:  No notes on file    Interval History: No acute events overnight.  More cooperative this morning, still refusing care at some points and seems the have low threshold for aggressive outbursts and verbal abuse.  Nurse noted his vision is poor, but he denies any vision complaints.  Denies pain.  Noted to be  "urinating all over the place.      Objective:     Vital Signs (Most Recent):  Temp: 97.9 °F (36.6 °C) (08/19/23 0904)  Pulse: 86 (08/19/23 0904)  Resp: 18 (08/19/23 0904)  BP: 117/61 (08/19/23 0904)  SpO2: 98 % (08/19/23 0904) Vital Signs (24h Range):  Temp:  [97.4 °F (36.3 °C)-98.6 °F (37 °C)] 97.9 °F (36.6 °C)  Pulse:  [] 86  Resp:  [18-20] 18  SpO2:  [96 %-98 %] 98 %  BP: (117-135)/(61-83) 117/61     Weight: 68 kg (149 lb 14.6 oz)  Body mass index is 22.79 kg/m².    Intake/Output Summary (Last 24 hours) at 8/19/2023 1134  Last data filed at 8/19/2023 0952  Gross per 24 hour   Intake 890 ml   Output --   Net 890 ml           Physical Exam  Vitals and nursing note reviewed.   Constitutional:       General: He is not in acute distress.     Appearance: Normal appearance. He is not ill-appearing.   HENT:      Head: Normocephalic and atraumatic.      Nose: Nose normal.      Mouth/Throat:      Mouth: Mucous membranes are moist.   Eyes:      Extraocular Movements: Extraocular movements intact.   Cardiovascular:      Rate and Rhythm: Normal rate and regular rhythm.      Pulses: Normal pulses.      Heart sounds: No murmur heard.  Pulmonary:      Effort: Pulmonary effort is normal. No respiratory distress.   Abdominal:      General: Abdomen is flat.      Palpations: Abdomen is soft.      Tenderness: There is no abdominal tenderness.   Skin:     General: Skin is warm.      Capillary Refill: Capillary refill takes less than 2 seconds.   Neurological:      Mental Status: He is alert.      Comments: Alert, does not answer orienting questions or participate with neurological evaluation.  Less agitated and combative.  Answers all questions with, "I have to go to the bathroom right now, call the nurse."   Psychiatric:      Comments: Mood still labile, but not as aggressive today.  Seems to have a narrow margin of emotional control/modulation at the moment, but seems quite fragile.  No yelling, cursing or agressiveness today " during visit.  Family states that within the last week he has said several times he wishes to kill himself.             Significant Labs: All pertinent labs within the past 24 hours have been reviewed.    Significant Imaging: I have reviewed all pertinent imaging results/findings within the past 24 hours.      Assessment/Plan:      * Stroke  -Admitted to inpatient status  -Presented for evaluation of backwards fall a week ago and vision changes since then  -Noted CT head (8/2) showing a new left cerebellar infarct.  Patient left AMA at that time  -In ER this admit, CTA head showed cut off involving a part of the P2 segment of the right PCA, subacute to chronic infarct involving the right medial temporal lobe in the right occipital lobe & remote right pontine and left lateral cerebellar hemisphere infarcts.    -Suspect related to substance abuse - noted amphetamines in UDS  -A1c 9.5   , HDL 45,   LDL measurement invalid  -Echo showedEF 55-60%, diastolic dysfunction, negative bubble study.  -CUS showed 50-69% stenosis of the right ICA and 1-49% stenosis of the left ICA  -Consulted tele-neurology, PT, OT and SLP - input appreciated  -PT/OT recommend HH with PT, OT and SLP at discharge.  -Clinically improving - states vision symptoms have resolved, but nurses (and family) note that his vision is very poor.  He does not participate with acuity testing.  Technically he is medically stable for discharge from hospital, however he needs inpatient psychiatric hospitalization and we not at all confident he can safely navigate in that environment yet  -Continue DAPT (asa 81 + plavix 75) x 21 days and then stop aspirin.  -Continue statin  -Continue to pursue normotension  -Discharge pending ability to safely navigate around psych unit or placement in a medical psych unit, or alternatively clearance from psychiatry to lift PEC/CEC and discharge home with HH.    Polysubstance use disorder  -Urine drug screen positive  for amphetamines and benzodiazepines  -Patient denies use  -Likely contributor to medical illness as well as mood alterations and combativeness.  -Counseled on cessation    Noncompliance with medication regimen  -Noted history as well as illicit substance use and prior AMA  -Counseled     Substance induced mood disorder  -Noted agitation, combativeness and grandiosity of thoughts on admit  -Seen by tele-psych in ER and given provisional diagnosis of Ila and r/o mood disorder  -Placed under PEC and recommended inpatient psych placement once medically stable  -Up until today he has been very aggressive, agitated and combative.  Still not honest about his vision symptoms and not participatory with full exam.  However, no aggression, severe agitation or combativeness during my visit today.  Appears to have a thinly veiled control of his emotional outbursts today.  Strongly suspect substance induced mood disorder  -He needs inpatient psychiatric care, but we are not confident of his ability to ambulate safely in that environment.  If no medical psych beds available, will need to keep him here until we are confident of his ability to safely ambulate around a typical psych unit - or alternatively have psychiatric clearance to lift PEC/CEC and discharge home with HH.  -Continue monitoring CIWA q4h given benzos in his system on admit.  Thus far no need for lorazepam PRN CIWA > 10.  -Changed seroquel to trazodone 100mg qhs 8/18 - continue for now  -OK to use Geodon or Zyprexa PO/IM q 6 hours prn severe agitation - last given on 8/17.  -Re-consult tele-psych today - would mood stabilizing medications be helpful?    Type 1 diabetes  -Last A1c 10.4 1/27/23  -A1c now 9.5  -Blood sugars are better so far today  -Refuses labs  -Increase detemir to 25 units qd and continue lispro 7 units tidwm  -Continue moderate dose SSI ac/hs    CAD (coronary artery disease)  -Denies chest pain  -Continue aspirin and statin  -Monitor on  telemetry    COPD (chronic obstructive pulmonary disease)  -No wheezing at this time  -P.rchastity Cam.    Hyperlipidemia  -Continue statin.    Primary hypertension  -BP normal to moderately elevated  -Given subacute nature of his stroke - pursuing normotension  -No BP meds on home list  -Given CAD history added metoprolol 8/16 - continue for now      VTE Risk Mitigation (From admission, onward)         Ordered     enoxaparin injection 40 mg  Every 24 hours         08/16/23 1155     IP VTE LOW RISK PATIENT  Once         08/16/23 0025     Place sequential compression device  Until discontinued         08/16/23 0025                Discharge Planning   ROLA:      Code Status: Full Code   Is the patient medically ready for discharge?:     Reason for patient still in hospital (select all that apply): Treatment  Discharge Plan A: Psychiatric hospital                  Cullen Jackman MD  Department of Hospital Medicine   [facility] - Telemetry

## 2023-08-19 NOTE — NURSING
"Patient uncooperative and hostile during medication administration and assessment. Unable to do 4 eyes assessment. Unable to complete assessment. Patient stated "I don't want f**jeff water. I would rather drink p*ss'' and demanded something else to drink.   "

## 2023-08-19 NOTE — PT/OT/SLP PROGRESS
Occupational Therapy   Treatment    Name: Cali Mabry  MRN: [id number]  Admitting Diagnosis:  Stroke       Recommendations:     Discharge Recommendations:  (pt will require 24 hour supervision at next level of care; per chart, pt to d/c to IP psych facility)  Discharge Equipment Recommendations:  none  Barriers to discharge:   (pt will continue to require supervision d/t poor safety awareness)    Assessment:     Cali Mabry is a 57 y.o. male with a medical diagnosis of Stroke. Performance deficits affecting function are decreased safety awareness, impaired cognition, impaired functional mobility, impaired endurance, visual deficits, impaired self care skills, weakness.     SBA for balance with hand-held assistance as needed (for safety) for ~60 ft functional mobility and standing grooming ADLs at the sink. No LOB. Pt changed his paper scrubs seated EOB with MIN A (d/t material of clothes). Pt was calm and cooperative throughout session. POC upgraded to daily per MD. D/w nurse and safety sitter that pt should be ambulating with nursing staff with hand-held assistance daily as well if safe behavior permits    Rehab Prognosis:  Fair; patient would benefit from acute skilled OT services to address these deficits and reach maximum level of function.       Plan:     Patient to be seen daily to address the above listed problems via self-care/home management, therapeutic activities, therapeutic exercises  Plan of Care Expires: 08/30/23  Plan of Care Reviewed with: patient    Subjective     Chief Complaint: needs a change of clothes   Patient/Family Comments/goals: agreeable to session; calm     Pain/Comfort:  Pain Rating 1: 0/10    Objective:     Communicated with: nurseRadha, prior to session.  Patient found HOB elevated with peripheral IV, telemetry (safety sitter) upon OT entry to room.    General Precautions: Standard, fall (PEC)    Orthopedic Precautions:N/A  Braces: N/A  Respiratory Status: Room air      Occupational Performance:     Bed Mobility:    Patient completed Scooting with supervision  Patient completed Supine to Sit with supervision     Functional Mobility/Transfers:  Patient completed Sit <> Stand Transfer with stand by assistance  with  no assistive device   Functional Mobility: pt completed ~60 ft total (1 seated rest break) with SBA for balance, but CGA at times for hand-held assistance for safety. No LOB noted. Verbal cueing for safe hand placement for stand>sit in the chair.     Activities of Daily Living:  Feeding:  supervision seated EOB to unscrew/screw water cap and take sips   Grooming: stand by assistance standing at the sink for a few min to comb hair (talking a lot, extra time taken but good standing tolerance noted)   Upper Body Dressing: min A to doff dirty paper scrub top and don clean one (assist d/t difficult/not normal material while seated unsupported at EOB     Lower Body Dressing: stand by assistance seated then standing to don paper scrubs       AMPA 6 Click ADL: 22    Treatment & Education:  Pt re-educated on OT role/POC.   Importance of OOB activity with staff assistance. D/w nurse, Radha, that if pt continues to have appropriate behavior, nursing staff should be ambulating pt daily even if just ~30 ft within the hallway with hand-held assistance. Nursing observed pt ambulate in the hallway with therapy this session.   Safety during functional t/f and mobility   D/w safety sitter that pt can ambulate within the room using HHA. OT retrieved chair for pt compliance with OOB>chair daily.   Seated EOB unsupported, pt completed the following BUE AROM x12 each:   Shoulder flex/ext, shoulder horizontal ab/dduction, forward punches, trunk flexion with toe touches, trunk rotation L<>R   Multiple self-care tasks/functional mobility completed- assistance level noted above   All questions/concerns answered within OT scope of practice       Patient left  reclined in the chair with tray  table next to pt  with all lines intact, call button in reach, nurse, Radha, notified, safety sitter and PTA, Tram, present, and all needs met/within reach; door left open. Lights on, blinds opened.     GOALS:   Multidisciplinary Problems       Occupational Therapy Goals          Problem: Occupational Therapy    Goal Priority Disciplines Outcome Interventions   Occupational Therapy Goal     OT, PT/OT Ongoing, Progressing    Description: Goals to be met by: 08/30/23     Patient will increase functional independence with ADLs by performing:    LE Dressing with Supervision.  Grooming while standing at sink with Supervision.  Toileting from toilet with Supervision for hygiene and clothing management.   Supine to sit with Supervision (goal met 08/19/23)  Step transfer with Supervision  Toilet transfer to toilet with Supervision.  Upper extremity exercise program x15 reps per handout, with independence.                         Time Tracking:     OT Date of Treatment: 08/19/23  OT Start Time: 1203  OT Stop Time: 1226  OT Total Time (min): 23 min    Billable Minutes:Self Care/Home Management 15 min  Therapeutic Exercise 8 min  Total Time 23 min (co-treat with PTA for safety)     OT/LEONCIO: OT     Number of LEONCIO visits since last OT visit: 0    8/19/2023

## 2023-08-19 NOTE — NURSING
Blood Sugars  Am:  197 9 units given + 20 detemir  Pm: 444  17 units given + 5 detemir  Evening : 146  C/o R sided Head ache, tylenol given @ 10am w/ minimal relief    Patient is much more awake alert, calm, and cooperative    Ate 75% of breakfast, tolerating fluids well  100% of lunch

## 2023-08-19 NOTE — SUBJECTIVE & OBJECTIVE
Interval History: No acute events overnight.  More cooperative this morning, still refusing care at some points and seems the have low threshold for aggressive outbursts and verbal abuse.  Nurse noted his vision is poor, but he denies any vision complaints.  Denies pain.  Noted to be urinating all over the place.      Objective:     Vital Signs (Most Recent):  Temp: 97.9 °F (36.6 °C) (08/19/23 0904)  Pulse: 86 (08/19/23 0904)  Resp: 18 (08/19/23 0904)  BP: 117/61 (08/19/23 0904)  SpO2: 98 % (08/19/23 0904) Vital Signs (24h Range):  Temp:  [97.4 °F (36.3 °C)-98.6 °F (37 °C)] 97.9 °F (36.6 °C)  Pulse:  [] 86  Resp:  [18-20] 18  SpO2:  [96 %-98 %] 98 %  BP: (117-135)/(61-83) 117/61     Weight: 68 kg (149 lb 14.6 oz)  Body mass index is 22.79 kg/m².    Intake/Output Summary (Last 24 hours) at 8/19/2023 1134  Last data filed at 8/19/2023 0952  Gross per 24 hour   Intake 890 ml   Output --   Net 890 ml           Physical Exam  Vitals and nursing note reviewed.   Constitutional:       General: He is not in acute distress.     Appearance: Normal appearance. He is not ill-appearing.   HENT:      Head: Normocephalic and atraumatic.      Nose: Nose normal.      Mouth/Throat:      Mouth: Mucous membranes are moist.   Eyes:      Extraocular Movements: Extraocular movements intact.   Cardiovascular:      Rate and Rhythm: Normal rate and regular rhythm.      Pulses: Normal pulses.      Heart sounds: No murmur heard.  Pulmonary:      Effort: Pulmonary effort is normal. No respiratory distress.   Abdominal:      General: Abdomen is flat.      Palpations: Abdomen is soft.      Tenderness: There is no abdominal tenderness.   Skin:     General: Skin is warm.      Capillary Refill: Capillary refill takes less than 2 seconds.   Neurological:      Mental Status: He is alert.      Comments: Alert, does not answer orienting questions or participate with neurological evaluation.  Less agitated and combative.  Answers all questions  "with, "I have to go to the bathroom right now, call the nurse."   Psychiatric:      Comments: Mood still labile, but not as aggressive today.  Seems to have a narrow margin of emotional control/modulation at the moment, but seems quite fragile.  No yelling, cursing or agressiveness today during visit.  Family states that within the last week he has said several times he wishes to kill himself.             Significant Labs: All pertinent labs within the past 24 hours have been reviewed.    Significant Imaging: I have reviewed all pertinent imaging results/findings within the past 24 hours.  "

## 2023-08-19 NOTE — PT/OT/SLP PROGRESS
Physical Therapy Treatment    Patient Name:  Cali Mabry   MRN:  [id number]    Recommendations:     Discharge Recommendations: psychiatric facility per chart  Discharge Equipment Recommendations: none    Assessment:     Cali Mabry is a 57 y.o. male admitted with a medical diagnosis of Stroke.  He presents with the following impairments/functional limitations: weakness, impaired endurance, gait instability, impaired balance, decreased lower extremity function, decreased ROM, pain, decreased safety awareness, impaired cognition .    Rehab Prognosis: Good; patient would benefit from acute skilled PT services to address these deficits and reach maximum level of function.    Recent Surgery: * No surgery found *      Plan:     During this hospitalization, patient to be seen 3 x/week ( daily per MD requested ) to address the identified rehab impairments via gait training, therapeutic activities, therapeutic exercises and progress toward the following goals:    Plan of Care Expires:  08/18/23    Subjective     Chief Complaint: BLE weakness   Patient/Family Comments/goals: pt is calm and cooperative to participate in therapy treatment   Pain/Comfort:  Pain Rating 1: 0/10  Pain Rating Post-Intervention 1: 0/10      Objective:     Communicated with nurse Garcia  prior to session.  Patient found HOB elevated with telemetry, bed alarm, peripheral IV upon PT entry to room.     General Precautions: Standard, fall  Orthopedic Precautions: N/A  Braces: N/A  Respiratory Status: Room air     Functional Mobility:  Bed Mobility:     Rolling Left:  supervision  Scooting: supervision  Supine to Sit: supervision   Transfers:     Sit to Stand: x 3 trials from bed  stand by assistance for balance with hand-held assist for safety   Bed to Chair: stand by assistance and contact guard assistance with  hand-held assist  using  Step Transfer  Gait: pt ambulated 60 ft with R HHA , SBA for balance and CGA for safety. Noted with  decreased xiomara,decreased step length, min unsteadiness , c/o decreased visions ;however no LOB . Pt declined to ambulate further 2* to c/o fatigue/weakness .  V/T cues for safety awareness.   Balance:  good in sitting, fair in standing.       AM-PAC 6 CLICK MOBILITY  Turning over in bed (including adjusting bedclothes, sheets and blankets)?: 4  Sitting down on and standing up from a chair with arms (e.g., wheelchair, bedside commode, etc.): 4  Moving from lying on back to sitting on the side of the bed?: 4  Moving to and from a bed to a chair (including a wheelchair)?: 4  Need to walk in hospital room?: 3  Climbing 3-5 steps with a railing?: 3  Basic Mobility Total Score: 22       Treatment & Education:  Lower Extremity Exercises.    Patient educated on: Purpose and Benefits of Therapeutic Exercise(s).    Patient verbalized acceptance/understanding of instruction(s), expectation(s), and limitation(s) (for safety).  Patient performed: 2 sets of 10 reps (each) of B LE Ther Ex: AP, LAQ, HSC, Hip abd/add, Hip flexion while sitting up on EOB/ chair.        Patient required rest breaks , verbal cues/tactile cues to ensure correct sequence, to maintain proper form, and to allow for self-correction.  Pt reported no complaints or problems with exercise activity.     Patient left up in reclined chair with all lines intact, call button in reach, nurse notified, and safety sitter  present..    GOALS:   Multidisciplinary Problems       Physical Therapy Goals          Problem: Physical Therapy    Goal Priority Disciplines Outcome Goal Variances Interventions   Physical Therapy Goal     PT, PT/OT Ongoing, Progressing     Description: Goals to be met by: 23     Patient will increase functional independence with mobility by performin. Pt to be (I) with bed mobility.  2. Pt to transfer with (S).  3. Pt to ambulate 150' /c or /s AD and (S).                         Time Tracking:     PT Received On: 23  PT Start  Time: 1203     PT Stop Time: 1226  PT Total Time (min): 23 min     Billable Minutes: Gait Training 12, Therapeutic Exercise 11 , and Total Time 23 min with OT for safety     Treatment Type: Treatment  PT/PTA: PTA     Number of PTA visits since last PT visit: 1 08/19/2023

## 2023-08-19 NOTE — PLAN OF CARE
Problem: Occupational Therapy  Goal: Occupational Therapy Goal  Description: Goals to be met by: 08/30/23     Patient will increase functional independence with ADLs by performing:    LE Dressing with Supervision.  Grooming while standing at sink with Supervision.  Toileting from toilet with Supervision for hygiene and clothing management.   Supine to sit with Supervision (goal met 08/19/23)  Step transfer with Supervision  Toilet transfer to toilet with Supervision.  Upper extremity exercise program x15 reps per handout, with independence.    Outcome: Ongoing, Progressing     SBA for balance with hand-held assistance as needed for ~60 ft functional mobility and standing grooming ADLs at the sink. No LOB. Pt changed his paper scrubs seated EOB with MIN A (d/t material of clothes). Pt was calm and cooperative throughout session. POC upgraded to daily per MD. D/w nurse and safety sitter that pt should be ambulating with nursing staff with hand-held assistance daily as well if safe behavior permits.

## 2023-08-19 NOTE — ASSESSMENT & PLAN NOTE
-Admitted to inpatient status  -Presented for evaluation of backwards fall a week ago and vision changes since then  -Noted CT head (8/2) showing a new left cerebellar infarct.  Patient left AMA at that time  -In ER this admit, CTA head showed cut off involving a part of the P2 segment of the right PCA, subacute to chronic infarct involving the right medial temporal lobe in the right occipital lobe & remote right pontine and left lateral cerebellar hemisphere infarcts.    -Suspect related to substance abuse - noted amphetamines in UDS  -A1c 9.5   , HDL 45,   LDL measurement invalid  -Echo showedEF 55-60%, diastolic dysfunction, negative bubble study.  -CUS showed 50-69% stenosis of the right ICA and 1-49% stenosis of the left ICA  -Consulted tele-neurology, PT, OT and SLP - input appreciated  -PT/OT recommend HH with PT, OT and SLP at discharge.  -Clinically improving - states vision symptoms have resolved, but nurses (and family) note that his vision is very poor.  He does not participate with acuity testing.  Technically he is medically stable for discharge from hospital, however he needs inpatient psychiatric hospitalization and we not at all confident he can safely navigate in that environment yet  -Continue DAPT (asa 81 + plavix 75) x 21 days and then stop aspirin.  -Continue statin  -Continue to pursue normotension  -Discharge pending ability to safely navigate around psych unit or placement in a medical psych unit, or alternatively clearance from psychiatry to lift PEC/CEC and discharge home with HH.

## 2023-08-19 NOTE — PLAN OF CARE
Problem: Adjustment to Illness (Stroke, Ischemic/Transient Ischemic Attack)  Goal: Optimal Coping  Outcome: Ongoing, Not Progressing  Intervention: Support Psychosocial Response to Stroke  Flowsheets (Taken 8/19/2023 0636)  Supportive Measures:   problem-solving facilitated   active listening utilized     Problem: Cerebral Tissue Perfusion (Stroke, Ischemic/Transient Ischemic Attack)  Goal: Optimal Cerebral Tissue Perfusion  Outcome: Ongoing, Progressing  Intervention: Protect and Optimize Cerebral Perfusion  Flowsheets (Taken 8/19/2023 0636)  Sensory Stimulation Regulation:   auditory stimulation minimized   care clustered   lighting decreased   quiet environment promoted   television on   visual stimulation minimized   visitors limited  Cerebral Perfusion Promotion: blood pressure monitored     Problem: Cognitive Impairment (Stroke, Ischemic/Transient Ischemic Attack)  Goal: Optimal Cognitive Function  Outcome: Ongoing, Progressing  Intervention: Optimize Cognitive Function  Flowsheets (Taken 8/19/2023 0636)  Sensory Stimulation Regulation:   auditory stimulation minimized   care clustered   lighting decreased   quiet environment promoted   television on   visual stimulation minimized   visitors limited

## 2023-08-19 NOTE — NURSING
"Patient ambulated to bathroom with walker and staff assistance. Gait is unsteady. Patients vision is off; didn't see a wall in front of him. Once in the bathroom, patient faced the shower to urinate not seeing the toilet directly in front of him. Once redirected, I asked was if he was not seeing well. He assured me that his vision is fine, "My body is just off."  "

## 2023-08-19 NOTE — ASSESSMENT & PLAN NOTE
-Last A1c 10.4 1/27/23  -A1c now 9.5  -Blood sugars are better so far today  -Refuses labs  -Increase detemir to 25 units qd and continue lispro 7 units tidwm  -Continue moderate dose SSI ac/hs

## 2023-08-20 LAB
CRP SERPL-MCNC: 10.7 MG/L (ref 0–8.2)
ERYTHROCYTE [SEDIMENTATION RATE] IN BLOOD BY WESTERGREN METHOD: 63 MM/HR (ref 0–10)
FERRITIN SERPL-MCNC: 44 NG/ML (ref 20–300)
HIV1+2 IGG SERPL QL IA.RAPID: NORMAL
IRON SERPL-MCNC: 37 UG/DL (ref 45–160)
POCT GLUCOSE: 213 MG/DL (ref 70–110)
POCT GLUCOSE: 240 MG/DL (ref 70–110)
SATURATED IRON: 8 % (ref 20–50)
TOTAL IRON BINDING CAPACITY: 468 UG/DL (ref 250–450)
TRANSFERRIN SERPL-MCNC: 316 MG/DL (ref 200–375)
TSH SERPL DL<=0.005 MIU/L-ACNC: 1.62 UIU/ML (ref 0.4–4)

## 2023-08-20 PROCEDURE — 87522 HEPATITIS C REVRS TRNSCRPJ: CPT | Performed by: HOSPITALIST

## 2023-08-20 PROCEDURE — 97530 THERAPEUTIC ACTIVITIES: CPT

## 2023-08-20 PROCEDURE — 99900035 HC TECH TIME PER 15 MIN (STAT)

## 2023-08-20 PROCEDURE — 25000003 PHARM REV CODE 250: Performed by: STUDENT IN AN ORGANIZED HEALTH CARE EDUCATION/TRAINING PROGRAM

## 2023-08-20 PROCEDURE — 86235 NUCLEAR ANTIGEN ANTIBODY: CPT | Performed by: HOSPITALIST

## 2023-08-20 PROCEDURE — 86038 ANTINUCLEAR ANTIBODIES: CPT | Performed by: HOSPITALIST

## 2023-08-20 PROCEDURE — 86235 NUCLEAR ANTIGEN ANTIBODY: CPT | Mod: 59 | Performed by: HOSPITALIST

## 2023-08-20 PROCEDURE — 86036 ANCA SCREEN EACH ANTIBODY: CPT | Performed by: HOSPITALIST

## 2023-08-20 PROCEDURE — 25000003 PHARM REV CODE 250: Performed by: HOSPITALIST

## 2023-08-20 PROCEDURE — 86162 COMPLEMENT TOTAL (CH50): CPT | Performed by: HOSPITALIST

## 2023-08-20 PROCEDURE — 99223 PR INITIAL HOSPITAL CARE,LEVL III: ICD-10-PCS | Mod: GC,,, | Performed by: PSYCHIATRY & NEUROLOGY

## 2023-08-20 PROCEDURE — S0166 INJ OLANZAPINE 2.5MG: HCPCS | Performed by: STUDENT IN AN ORGANIZED HEALTH CARE EDUCATION/TRAINING PROGRAM

## 2023-08-20 PROCEDURE — 85730 THROMBOPLASTIN TIME PARTIAL: CPT | Performed by: HOSPITALIST

## 2023-08-20 PROCEDURE — 63600175 PHARM REV CODE 636 W HCPCS: Performed by: HOSPITALIST

## 2023-08-20 PROCEDURE — 86592 SYPHILIS TEST NON-TREP QUAL: CPT | Performed by: HOSPITALIST

## 2023-08-20 PROCEDURE — 84443 ASSAY THYROID STIM HORMONE: CPT | Performed by: HOSPITALIST

## 2023-08-20 PROCEDURE — 82595 ASSAY OF CRYOGLOBULIN: CPT | Performed by: HOSPITALIST

## 2023-08-20 PROCEDURE — 86147 CARDIOLIPIN ANTIBODY EA IG: CPT | Performed by: HOSPITALIST

## 2023-08-20 PROCEDURE — 84425 ASSAY OF VITAMIN B-1: CPT | Performed by: HOSPITALIST

## 2023-08-20 PROCEDURE — 94761 N-INVAS EAR/PLS OXIMETRY MLT: CPT

## 2023-08-20 PROCEDURE — 85613 RUSSELL VIPER VENOM DILUTED: CPT | Performed by: HOSPITALIST

## 2023-08-20 PROCEDURE — 85652 RBC SED RATE AUTOMATED: CPT | Performed by: HOSPITALIST

## 2023-08-20 PROCEDURE — 82728 ASSAY OF FERRITIN: CPT | Performed by: HOSPITALIST

## 2023-08-20 PROCEDURE — 99223 1ST HOSP IP/OBS HIGH 75: CPT | Mod: GC,,, | Performed by: PSYCHIATRY & NEUROLOGY

## 2023-08-20 PROCEDURE — 86161 COMPLEMENT/FUNCTION ACTIVITY: CPT | Performed by: HOSPITALIST

## 2023-08-20 PROCEDURE — 86160 COMPLEMENT ANTIGEN: CPT | Performed by: HOSPITALIST

## 2023-08-20 PROCEDURE — 86140 C-REACTIVE PROTEIN: CPT | Performed by: HOSPITALIST

## 2023-08-20 PROCEDURE — 83540 ASSAY OF IRON: CPT | Performed by: HOSPITALIST

## 2023-08-20 PROCEDURE — 84466 ASSAY OF TRANSFERRIN: CPT | Performed by: HOSPITALIST

## 2023-08-20 PROCEDURE — 86431 RHEUMATOID FACTOR QUANT: CPT | Performed by: HOSPITALIST

## 2023-08-20 PROCEDURE — 21400001 HC TELEMETRY ROOM

## 2023-08-20 PROCEDURE — 86703 HIV-1/HIV-2 1 RESULT ANTBDY: CPT | Performed by: HOSPITALIST

## 2023-08-20 PROCEDURE — 86160 COMPLEMENT ANTIGEN: CPT | Mod: 59 | Performed by: HOSPITALIST

## 2023-08-20 PROCEDURE — 82607 VITAMIN B-12: CPT | Performed by: HOSPITALIST

## 2023-08-20 PROCEDURE — 86225 DNA ANTIBODY NATIVE: CPT | Performed by: HOSPITALIST

## 2023-08-20 RX ORDER — CLONIDINE HYDROCHLORIDE 0.1 MG/1
0.1 TABLET ORAL EVERY 8 HOURS PRN
Status: DISCONTINUED | OUTPATIENT
Start: 2023-08-20 | End: 2023-08-22 | Stop reason: HOSPADM

## 2023-08-20 RX ORDER — CHLORPROMAZINE HYDROCHLORIDE 25 MG/ML
25 INJECTION INTRAMUSCULAR
Status: DISCONTINUED | OUTPATIENT
Start: 2023-08-20 | End: 2023-08-22 | Stop reason: HOSPADM

## 2023-08-20 RX ORDER — LAMOTRIGINE 25 MG/1
25 TABLET ORAL NIGHTLY
Status: DISCONTINUED | OUTPATIENT
Start: 2023-08-20 | End: 2023-08-22 | Stop reason: HOSPADM

## 2023-08-20 RX ORDER — GABAPENTIN 400 MG/1
400 CAPSULE ORAL 3 TIMES DAILY
Status: DISCONTINUED | OUTPATIENT
Start: 2023-08-20 | End: 2023-08-22 | Stop reason: HOSPADM

## 2023-08-20 RX ORDER — LAMOTRIGINE 25 MG/1
50 TABLET ORAL NIGHTLY
Status: DISCONTINUED | OUTPATIENT
Start: 2023-09-03 | End: 2023-08-22 | Stop reason: HOSPADM

## 2023-08-20 RX ORDER — CHLORPROMAZINE HYDROCHLORIDE 25 MG/1
25 TABLET, FILM COATED ORAL 3 TIMES DAILY
Status: DISCONTINUED | OUTPATIENT
Start: 2023-08-20 | End: 2023-08-22 | Stop reason: HOSPADM

## 2023-08-20 RX ADMIN — INSULIN ASPART 4 UNITS: 100 INJECTION, SOLUTION INTRAVENOUS; SUBCUTANEOUS at 03:08

## 2023-08-20 RX ADMIN — INSULIN ASPART 4 UNITS: 100 INJECTION, SOLUTION INTRAVENOUS; SUBCUTANEOUS at 08:08

## 2023-08-20 RX ADMIN — MUPIROCIN: 20 OINTMENT TOPICAL at 08:08

## 2023-08-20 RX ADMIN — INSULIN ASPART 2 UNITS: 100 INJECTION, SOLUTION INTRAVENOUS; SUBCUTANEOUS at 08:08

## 2023-08-20 RX ADMIN — LAMOTRIGINE 25 MG: 25 TABLET ORAL at 08:08

## 2023-08-20 RX ADMIN — ACETAMINOPHEN 650 MG: 325 TABLET ORAL at 08:08

## 2023-08-20 RX ADMIN — ACETAMINOPHEN 650 MG: 325 TABLET ORAL at 01:08

## 2023-08-20 RX ADMIN — Medication 6 MG: at 08:08

## 2023-08-20 RX ADMIN — ATORVASTATIN CALCIUM 20 MG: 10 TABLET, FILM COATED ORAL at 08:08

## 2023-08-20 RX ADMIN — ENOXAPARIN SODIUM 40 MG: 40 INJECTION SUBCUTANEOUS at 04:08

## 2023-08-20 RX ADMIN — ACETAMINOPHEN 650 MG: 325 TABLET ORAL at 10:08

## 2023-08-20 RX ADMIN — INSULIN ASPART 7 UNITS: 100 INJECTION, SOLUTION INTRAVENOUS; SUBCUTANEOUS at 03:08

## 2023-08-20 RX ADMIN — CLOPIDOGREL BISULFATE 75 MG: 75 TABLET, FILM COATED ORAL at 08:08

## 2023-08-20 RX ADMIN — GABAPENTIN 400 MG: 400 CAPSULE ORAL at 08:08

## 2023-08-20 RX ADMIN — GABAPENTIN 400 MG: 400 CAPSULE ORAL at 03:08

## 2023-08-20 RX ADMIN — CHLORPROMAZINE HYDROCHLORIDE 25 MG: 25 TABLET, FILM COATED ORAL at 03:08

## 2023-08-20 RX ADMIN — CHLORPROMAZINE HYDROCHLORIDE 25 MG: 25 TABLET, FILM COATED ORAL at 08:08

## 2023-08-20 RX ADMIN — ASPIRIN 81 MG CHEWABLE TABLET 81 MG: 81 TABLET CHEWABLE at 08:08

## 2023-08-20 RX ADMIN — CHLORPROMAZINE HYDROCHLORIDE 25 MG: 25 INJECTION INTRAMUSCULAR at 09:08

## 2023-08-20 RX ADMIN — OLANZAPINE 5 MG: 10 INJECTION, POWDER, FOR SOLUTION INTRAMUSCULAR at 01:08

## 2023-08-20 RX ADMIN — INSULIN ASPART 7 UNITS: 100 INJECTION, SOLUTION INTRAVENOUS; SUBCUTANEOUS at 08:08

## 2023-08-20 NOTE — PLAN OF CARE
Problem: Occupational Therapy  Goal: Occupational Therapy Goal  Description: Goals to be met by: 08/30/23     Patient will increase functional independence with ADLs by performing:    LE Dressing with Supervision.  Grooming while standing at sink with Supervision.  Toileting from toilet with Supervision for hygiene and clothing management.   Supine to sit with Supervision (goal met 08/19/23)  Step transfer with Supervision  Toilet transfer to toilet with Supervision.  Upper extremity exercise program x15 reps per handout, with independence.    Outcome: Ongoing, Progressing

## 2023-08-20 NOTE — CONSULTS
"[facility] Tele-Consultation from Neurology    Consultation started: 8/20/2023 at 1:41 PM   The chief complaint leading to consultation is: medication question - " Psych suspects NPH asking if Nuedexta for PBA would be safe"  This consultation was requested by:    The patient location is: [facility]  The patient arrived at: 130pm  Spoke nurse at bedside with patient assisting consultant. Also present with the patient at the time of the consultation:None and sitter at bedside    Inpatient consult to Telemedicine-General Neurology  Consult performed by: Lyssa Kendrick MD  Consult ordered by: Cullen Jackman MD           Subjective:     History of Present Illness:  Cali Mabry is a 57 y.o. male who presents with "Psych suspects NPH asking if Nuedexta for PBA would be safe".      Spoke with patient. He was very angry and frustrated, wanting to leave. At times, yelling about jewelry that was missing and relentless about asking when he was going to leave. He would not confirm his history other than to say that he developed the headache and symptoms mentioned while in the hospital, but he reports "I feel normal now."  No pain. No headaches. No dizziness. No numbness or tingling. He denied any symptoms that was asked of him, followed closely by "Can I leave now?"  Reports some non-compliance with medications previously, but states that his mom and sister will help him from now.    Per HPI/chart review,  "This is a 57-year-old male with a past medical history of type 1 diabetes, COPD (not on O2), CAD, hypertension, hyperlipidemia, substance use, mood disorder who presents with blurry vision.    Patient had multiple ED presentations for headaches, blurry vision, eye pain this month.  On 08/02, he presented to [facility] and had a CT head that showed a new left cerebellar infarct, however left against medical advice.  He returned to  ED on 8/7 and to [facility] ED for worsening vision loss, however left AMA.  He had a " "brief hospitalization to  on 8/9 for DKA after presenting with similar symptoms as well.  Patient is known to be verbally abusive to staff.    In the ED, the patient was hypertensive.  Labs were remarkable for hypercholesterolemia (297).  UDS was positive for amphetamines and benzodiazepines.  CTA head showed Cut off involving a part of the P2 segment of the right PCA,  subacute to chronic infarct involving the right medial temporal lobe in the right occipital lobe & remote right pontine and left lateral cerebellar hemisphere infarcts.  Vascular Neurology was consulted and stated patient can stay at [facility].  Psychiatry was consulted and recommended pec giving grave disability.  Patient was given aspirin 325 mg and was admitted for further management."    Patient information was obtained from patient, past medical records, ER records, and primary team.    Past Medical History:   Diagnosis Date    Acute on chronic pancreatitis 04/03/2016    Addiction to drug     CHF (congestive heart failure)     DKA (diabetic ketoacidoses) 02/2020    DM (diabetes mellitus), type 1     HCV (hepatitis C virus)     high VL     HTN (hypertension)     Hx of psychiatric care     Hypomagnesemia 05/25/2020    IVDU (intravenous drug user)     Heroin    Pancreatitis     Psychiatric problem     Substance abuse        Past Surgical History:   Procedure Laterality Date    CARDIAC SURGERY  1999    stent placed. ([facility])    ESOPHAGOGASTRODUODENOSCOPY N/A 3/5/2020    Gastritis.  No H pylori.  Completed PPI for 1 month.  Procedure: EGD (ESOPHAGOGASTRODUODENOSCOPY);  Surgeon: Brinda Rene MD;  Location: [facility] ENDO;  Service: Endoscopy;  Laterality: N/A;  [id number]; [id number]    LEFT HEART CATHETERIZATION Left 9/28/2020    Procedure: Left heart cath;  Surgeon: Fito Rangel MD;  Location: [facility] CATH LAB;  Service: Cardiology;  Laterality: Left;    SHOULDER SURGERY  2013    right shoulder, spur removal.       Family History   Problem Relation " Age of Onset    Asthma Mother         Social History     Tobacco Use    Smoking status: Every Day     Current packs/day: 0.00     Average packs/day: 1 pack/day for 39.2 years (39.2 ttl pk-yrs)     Types: Cigarettes     Start date: 4/3/1981     Last attempt to quit: 6/1/2020     Years since quitting: 3.2    Smokeless tobacco: Never    Tobacco comments:     states approx 1 per day.   Substance Use Topics    Alcohol use: Not Currently     Alcohol/week: 0.0 standard drinks of alcohol        Medications:   Current Facility-Administered Medications:     acetaminophen tablet 650 mg, 650 mg, Oral, Q8H PRN, Josep Simon MD, 650 mg at 08/20/23 0149    acetaminophen tablet 650 mg, 650 mg, Oral, Q4H PRN, Josep Simon MD, 650 mg at 08/20/23 1037    albuterol-ipratropium 2.5 mg-0.5 mg/3 mL nebulizer solution 3 mL, 3 mL, Nebulization, Q4H PRN, Josep Simon MD    aluminum-magnesium hydroxide-simethicone 200-200-20 mg/5 mL suspension 30 mL, 30 mL, Oral, QID PRN, Josep Simon MD    aspirin chewable tablet 81 mg, 81 mg, Oral, Daily, Cullen Jackman MD, 81 mg at 08/20/23 0822    atorvastatin tablet 20 mg, 20 mg, Oral, Daily, Josep Simon MD, 20 mg at 08/20/23 0822    bisacodyL suppository 10 mg, 10 mg, Rectal, Daily PRN, Josep Simon MD    chlorproMAZINE injection 25 mg, 25 mg, Intramuscular, Q2H PRN, Cullen Jackman MD, 25 mg at 08/20/23 0915    chlorproMAZINE tablet 25 mg, 25 mg, Oral, TID, Cullen Jackman MD    cloNIDine tablet 0.1 mg, 0.1 mg, Oral, Q8H PRN, Cullen Jackman MD    clopidogreL tablet 75 mg, 75 mg, Oral, Daily, Cullen Jackman MD, 75 mg at 08/20/23 0822    dextrose 50% injection 12.5 g, 12.5 g, Intravenous, PRN, Josep Simon MD    dextrose 50% injection 25 g, 25 g, Intravenous, PRN, Josep Simon MD    enoxaparin injection 40 mg, 40 mg, Subcutaneous, Q24H (prophylaxis, 1700), Cullen Jackman MD, 40 mg at 08/19/23 1606    gabapentin capsule 400 mg, 400 mg, Oral, TID, Cullen Jackman MD    glucagon (human  recombinant) injection 1 mg, 1 mg, Intramuscular, PRN, Josep Simon MD    glucagon (human recombinant) injection 1 mg, 1 mg, Intramuscular, PRN, Cullen Jackman MD    glucose chewable tablet 16 g, 16 g, Oral, PRN, Josep Simon MD    glucose chewable tablet 16 g, 16 g, Oral, PRN, Cullen Jackman MD    glucose chewable tablet 24 g, 24 g, Oral, PRN, Josep Simon MD    glucose chewable tablet 24 g, 24 g, Oral, PRN, Cullen Jackman MD    insulin aspart U-100 pen 1-10 Units, 1-10 Units, Subcutaneous, QID (WM & HS), Cullen Jackman MD, 4 Units at 08/20/23 0821    insulin aspart U-100 pen 1-10 Units, 1-10 Units, Subcutaneous, QID (AC + HS) PRN, Kaleb Pino PA-C, 5 Units at 08/17/23 2126    insulin aspart U-100 pen 7 Units, 7 Units, Subcutaneous, TIDWM, Cullen Jackman MD, 7 Units at 08/20/23 0820    insulin detemir U-100 (Levemir) pen 25 Units, 25 Units, Subcutaneous, Daily, Cullen Jackman MD, 25 Units at 08/20/23 0819    labetaloL injection 10 mg, 10 mg, Intravenous, Q15 Min PRN, Josep Simon MD    lamoTRIgine tablet 25 mg, 25 mg, Oral, QHS **FOLLOWED BY** [START ON 9/3/2023] lamoTRIgine tablet 50 mg, 50 mg, Oral, QHS, Cullen Jackman MD    magnesium oxide tablet 800 mg, 800 mg, Oral, PRN, Josep Simon MD    magnesium oxide tablet 800 mg, 800 mg, Oral, PRN, Josep Simon MD    melatonin tablet 6 mg, 6 mg, Oral, Nightly PRN, Josep Simon MD, 6 mg at 08/19/23 2123    metoprolol succinate (TOPROL-XL) 24 hr tablet 25 mg, 25 mg, Oral, Daily, Cullen Jackman MD, 25 mg at 08/19/23 0903    mupirocin 2 % ointment, , Nasal, BID, MarkerCullen MD, Given at 08/17/23 2129    naloxone 0.4 mg/mL injection 0.02 mg, 0.02 mg, Intravenous, PRN, Josep Simon MD    polyethylene glycol packet 17 g, 17 g, Oral, Daily, Josep Simon MD    potassium bicarbonate disintegrating tablet 35 mEq, 35 mEq, Oral, PRN, Josep Simon MD    potassium bicarbonate disintegrating tablet 50 mEq, 50 mEq, Oral, PRN, Josep Simon MD     "potassium bicarbonate disintegrating tablet 60 mEq, 60 mEq, Oral, PRN, Josep Simon MD    potassium, sodium phosphates 280-160-250 mg packet 2 packet, 2 packet, Oral, PRNAlfred Omar, MD    potassium, sodium phosphates 280-160-250 mg packet 2 packet, 2 packet, Oral, PRNAlfred Omar, MD    potassium, sodium phosphates 280-160-250 mg packet 2 packet, 2 packet, Oral, PRNAlfred Omar, MD    prochlorperazine injection Soln 5 mg, 5 mg, Intravenous, Q6H PRNAlfred Omar, MD    simethicone chewable tablet 80 mg, 1 tablet, Oral, QID PRNAlfred Omar, MD    sodium chloride 0.9% bolus 500 mL 500 mL, 500 mL, Intravenous, Continuous PRNAlfred Omar, MD    sodium chloride 0.9% flush 10 mL, 10 mL, Intravenous, Q12H PRNAlfred Omar, MD    sodium chloride 0.9% flush 10 mL, 10 mL, Intravenous, PRN, Josep Simon MD    Allergies: Review of patient's allergies indicates:  No Known Allergies    Review Of Systems: ROS      Objective:     Vitals: Blood pressure 112/65, pulse 80, temperature 98.6 °F (37 °C), temperature source Oral, resp. rate 16, height 5' 8" (1.727 m), weight 68 kg (149 lb 14.6 oz), SpO2 97 %. Reviewed for date of service       Neurological:    Mental status: Alert and oriented to self. Will not answer rest of questions but followed commands when strongly encouraged. Some dysarthria but may be due to being edentulous, unclear of baseline. No discernible facial weakness on brief exam that he allowed.   Cranial nerves:   CN III, IV, VI: Extraocular movements full  CN VII: Face appeared symmetric bilaterally, but again, difficult exam  CN VIII: Hearing grossly intact   CN XI: Strong shoulder shrug B   CN XII: Tongue appears midline   Motor: at least 3/5 t/o. Some drift to RUE. Slowed Denise on right   Coordination: dysmetria noted to RUE   Gait: wide based gait on witnessed walking in room. Appears ataxic.      Labs:Reviewed for date of service  Radiology (i.e. PET Scan, X-ray, CT, MRI): Reviewed for date of " service    Assessment - Diagnosis - Goals:     Impression: Cali Mabry 57 y.o. male with substance abuse disorder, CAD, HTN, HLD, DM1, med non-compliance, who presented with  left cerebellar stroke on 8/2/23, now with subacute right medial temporal and occiptal lobe stroke on this admission (8/15/23) in the setting of ongoing drug use (uds positive for amphetamines and benzo's). Currently very combative, psych following as well. Question is whether Neudexta would be of benefit/safe to give in this circumstance for possible PBA and with question of NPH as enlarged ventricles seen on HCT.     Recommendations:   Patient is extremely agitated and wanting to go home (relentless in asking about discharge; repeats question after almost every other sentence), but no other emotional lability such as exaggerated crying or laughing spells noted during my exam. Low concern for PBA at this time based on my exam. As for a possible trial of medication, please use with caution as it can cause increased dizziness and patient already with gait ataxia likely attributable to recent stroke and can increase the risks of falls.     For NPH, understand there is some ventriculomegaly vs hydrocephalus on imaging but inpatient testing of NPH is not recommended - he has another acute process (stroke), recent drug use - that could likely explain symptoms of ataxia/gait disturbance and behavioral issues. Also, he's extremely combative. He would have to be sedated just to complete the LP safely, which would compromise his cognitive testing needed for NPH testing. It's better that it is performed outpatient in NPH clinic if still suspected outside of withdrawal and recent stroke.    Ischemic strokes  Likely related to uncontrolled risk factors and continued drug use  Imaging reviewed. MRI brain w/o contrast preferred head imaging but per chart review, difficult to obtain due to agitation and patient cooperation.  Recommend optimization of  risk factors - hemoglobin A1c (goal <7%), lipid panel (LDL goal <70mg/dL). TSH within normal limits  Encouraged drug abuse cessation and tobacco use.  TTE performed (no bubble) and without shunt and no mention of thrombus.  Continue aspirin 81mg daily along with statin  Encouraged med compliance.   Agree with PT/OT/ST       Please contact us with any further questions or concerns or if patient has any acute neurological changes (new symptoms, worsening deficits).        Consultation ended:  2:36 PM     Total time spent with patient: > 70 Minutes      The attending portion of this evaluation, treatment, and documentation was performed per Lyssa Kendrick MD via audiovisual.        Thank you for this consult. Please do not hesitate to contact us with questions.

## 2023-08-20 NOTE — PT/OT/SLP PROGRESS
"Occupational Therapy   Treatment    Name: Cali Mabry  MRN: [id number]  Admitting Diagnosis:  Stroke       Recommendations:     Discharge Recommendations:  (pt will require 24 hour supervision at next level of care; per chart, pt to d/c to IP psych facility)  Discharge Equipment Recommendations:  none  Barriers to discharge:   (pt will continue to require supervision d/t poor safety awareness)    Assessment:     Cali Mabry is a 57 y.o. male with a medical diagnosis of Stroke.  Performance deficits affecting function are weakness, impaired endurance, impaired self care skills, impaired functional mobility, gait instability, impaired balance, decreased lower extremity function, decreased ROM, impaired cognition, decreased safety awareness.     Pt initially agitated at beginning of session but w/ therapeutic listening and acknowledgement of his feelings, pt became cooperative and pleasant w/ OT as he agreed to participate. Pt w/ instability and decreased balance, requiring close CGA-HHA for ambulating functional distances in the unit. Pt tolerated tx session fairly well and will continue to benefit from skilled acute OT services to maximize functional capacity.     Rehab Prognosis:  Fair; patient would benefit from acute skilled OT services to address these deficits and reach maximum level of function.       Plan:     Patient to be seen daily (per MD orders) to address the above listed problems via self-care/home management, therapeutic activities, therapeutic exercises  Plan of Care Expires: 08/30/23  Plan of Care Reviewed with: patient    Subjective     Chief Complaint: "I really want to get out of these clothes and walk home." "I know what I am talking about; I am in my right mind."   Patient/Family Comments/goals: "I am sorry, I did not mean to yell. We can do it." "I had a cane here but I don't know where they put it."   Pain/Comfort:  Pain Rating 1: 0/10  Pain Rating Post-Intervention 1: " "0/10    Objective:     Communicated with: Nurse prior to session.  Patient found HOB elevated with  (safety sitter) upon OT entry to room.    General Precautions: Standard, fall    Orthopedic Precautions:N/A  Braces: N/A  Respiratory Status: Room air     Occupational Performance:     Bed Mobility:    Patient completed Scooting to EOB with supervision  Patient completed Supine to Sit with supervision     Functional Mobility/Transfers:  Patient completed Sit <> Stand Transfer with stand by assistance  with  no assistive device   Functional Mobility: Pt was able to ambulate functional distance of ~80 ft w/ close CGA-HHA and no AD; pt w/ widened TATI and grabbing for hallway raling for stability.     Activities of Daily Living:  Declined needs at this time; reported "no, I am good."     [facility] 6 Click ADL: 22    Treatment & Education:  - Pt re-educated on role of OT and POC.   - Pt re-educated on importance of OOB activity with staff assistance. Nursing observed pt ambulate in the hallway with therapy this session.   - Safety during functional t/f and mobility      Patient left sitting edge of bed with all lines intact, call button in reach, and safety sitter  present w/ Radha aware and sitting at station. Door and blinds open.     GOALS:   Multidisciplinary Problems       Occupational Therapy Goals          Problem: Occupational Therapy    Goal Priority Disciplines Outcome Interventions   Occupational Therapy Goal     OT, PT/OT Ongoing, Progressing    Description: Goals to be met by: 08/30/23     Patient will increase functional independence with ADLs by performing:    LE Dressing with Supervision.  Grooming while standing at sink with Supervision.  Toileting from toilet with Supervision for hygiene and clothing management.   Supine to sit with Supervision (goal met 08/19/23)  Step transfer with Supervision  Toilet transfer to toilet with Supervision.  Upper extremity exercise program x15 reps per handout, with " independence.                         Time Tracking:     OT Date of Treatment: 08/20/23  OT Start Time: 1717  OT Stop Time: 1729  OT Total Time (min): 12 min    Billable Minutes:Therapeutic Activity 12  Total Time 12    OT/LEONCIO: OT     Number of LEONCIO visits since last OT visit: 0    8/20/2023

## 2023-08-20 NOTE — PLAN OF CARE
Problem: Adult Inpatient Plan of Care  Goal: Plan of Care Review  Outcome: Ongoing, Progressing  Flowsheets (Taken 8/20/2023 1805)  Plan of Care Reviewed With: patient     Problem: Communication Impairment (Stroke, Ischemic/Transient Ischemic Attack)  Goal: Improved Communication Skills  Outcome: Ongoing, Progressing  Intervention: Optimize Communication Skills  Flowsheets (Taken 8/20/2023 1805)  Communication Enhancement Strategies:   call light answered in person   communication board used   extra time allowed for response   repetition utilized     Problem: Swallowing Impairment (Stroke, Ischemic/Transient Ischemic Attack)  Goal: Optimal Eating and Swallowing without Aspiration  Outcome: Ongoing, Progressing  Intervention: Optimize Eating and Swallowing  Flowsheets (Taken 8/20/2023 1805)  Aspiration Precautions:   awake/alert before oral intake   upright posture maintained  Swallowing Interventions: Dysphagia: small bites/sips encouraged

## 2023-08-20 NOTE — NURSING
[facility] - [place], [place]  Nurses Note -- 4 Eyes      8/19/2023       Skin assessed on: Q Shift      [] No Pressure Injuries Present    []Prevention Measures Documented    [] Yes LDA  for Pressure Injury Previously documented     [] Yes New Pressure Injury Discovered   [] LDA for New Pressure Injury Added      Attending RN:  Kwesi Bennett, RN     Second RN:  Radha   UNABLE TO ASSESS DUE TO PT'S AGITATION AND RISK OF VIOLENCE

## 2023-08-20 NOTE — ASSESSMENT & PLAN NOTE
-Noted agitation, combativeness and grandiosity of thoughts on admit  -Seen by tele-psych in ER and given provisional diagnosis of Ila and r/o mood disorder  -Placed under PEC and recommended inpatient psych placement once medically stable  -Patient remains with severe aggression, agitation and combativeness.  Today threatened to punch me.  -Noting ventriculomegaly on CT - could NPH be a contributor to his difficulty ambulating, emotional lability and urinary incontinence?  Also, is it safe to use neudexta for him?  Will re-consult tele-neurology with these questions.    -Per psych recs 8/19 will rule out cryoglobulinemia (cryoglobulin, BEAR, dsDNA, complement, SSa, SSb, ANCA, RF, ESR, CRP, Sm/RNP), HCV viral load, B1, B12, TSH.  Once more calm and participatory can order MRI brain.    -Per psych recs 8/19:   A. Start thorazine 25mg po tid if will take oral.  Otherwise can use thorazine 25mg IM q2h until patient is calm (STILL NOT CALM).     B. Start gabapentin 400mg po tid for possible benzo withdrawal agitation component.     C. Add clonidine 0.1mg bid prn breakthrough agitation (1st line)   D. Add lamotrigine 25mg qhs x 2 weeks then 50mg qhs.   E: stop seroquel 100mg qhs.      F. Stop PRN ativan (goal is to cover possible benzo withdrawal with gabapentin)   G. Do not start: Trazodone (risk of falls and orthostatic hypotension), Geodon (can worsen mainia and agitation), Depakote (can worsen ventriculomegaly), Trileptal or Tegratol (risk of coronary adverse effects    -Security called to bedside this morning, 8/20.  Restraints are ordered if needed, but thus far have not need to be applied.      -Ultimately he needs inpatient psychiatric care, but is not medically cleared at this time.  Must be able to self toilet, feed and bath without need for assistance.  Further, I agree with psychiatry that he lacks capacity for medical decision making as well as recommendation for pursuing guardianship/conservatoryship.   Will alert case management and discuss with legal tomorrow    -Re-consult tele-psych today as pursuits of conservatorship require daily tele-psych evaluations

## 2023-08-20 NOTE — ASSESSMENT & PLAN NOTE
-Last A1c 10.4 1/27/23  -A1c now 9.5  -Blood sugars are better so far today  -Refuses labs and accu-checks  -Continue detemir to 25 units qd and lispro 7 units tidwm  -Continue moderate dose SSI ac/hs

## 2023-08-20 NOTE — NURSING
Tele psych consult in process, patient swearing and yelling at Dr Warner.Very agitated, remains in bed. Call terminated by MD.

## 2023-08-20 NOTE — SUBJECTIVE & OBJECTIVE
Interval History: No acute events overnight.  Remains severely agitated, yelling profanities and threatened to hit me during today's visit.  Required thorazine and security presence this morning so far.  Vision is obviously significantly compromised - could not see his glasses laying on his abdomen.      Objective:     Vital Signs (Most Recent):  Temp: 98.6 °F (37 °C) (08/20/23 0757)  Pulse: 80 (08/20/23 0757)  Resp: 16 (08/20/23 0757)  BP: 112/65 (08/20/23 0757)  SpO2: 97 % (08/20/23 0757) Vital Signs (24h Range):  Temp:  [97.4 °F (36.3 °C)-98.6 °F (37 °C)] 98.6 °F (37 °C)  Pulse:  [80-89] 80  Resp:  [16-18] 16  SpO2:  [97 %] 97 %  BP: (112-140)/(65-67) 112/65     Weight: 68 kg (149 lb 14.6 oz)  Body mass index is 22.79 kg/m².  No intake or output data in the 24 hours ending 08/20/23 1218        Physical Exam  Vitals and nursing note reviewed.   Constitutional:       General: He is not in acute distress.     Appearance: Normal appearance. He is not ill-appearing.   HENT:      Head: Normocephalic and atraumatic.      Nose: Nose normal.      Mouth/Throat:      Mouth: Mucous membranes are moist.   Eyes:      Extraocular Movements: Extraocular movements intact.      Comments: Vision is quite compromised - but does not participate in field testing or acuity   Cardiovascular:      Rate and Rhythm: Normal rate and regular rhythm.      Pulses: Normal pulses.      Heart sounds: No murmur heard.  Pulmonary:      Effort: Pulmonary effort is normal. No respiratory distress.   Abdominal:      General: Abdomen is flat.      Palpations: Abdomen is soft.      Tenderness: There is no abdominal tenderness.   Skin:     General: Skin is warm.      Capillary Refill: Capillary refill takes less than 2 seconds.   Neurological:      Mental Status: He is alert.      Comments: Alert, does not answer orienting questions or participate with neurological evaluation.  MUCH more agitated, combative and threatening physical attack to me today.   Moves all extremities.  Able to sit self up on bed promptly after threatening to punch me.   Psychiatric:      Comments: Extremely labile mood with aggression and threats of punching me today.  Yelling profanities, agitated and not-cooperative.  Family states that within the last week he has said several times he wishes to kill himself.             Significant Labs: All pertinent labs within the past 24 hours have been reviewed.    Significant Imaging: I have reviewed all pertinent imaging results/findings within the past 24 hours.

## 2023-08-20 NOTE — PLAN OF CARE
Problem: Fall Injury Risk  Goal: Absence of Fall and Fall-Related Injury  Outcome: Ongoing, Progressing     Problem: Diabetes Comorbidity  Goal: Blood Glucose Level Within Targeted Range  Outcome: Ongoing, Progressing     Problem: Adjustment to Illness (Delirium)  Goal: Optimal Coping  Outcome: Ongoing, Not Progressing     Problem: Attention and Thought Clarity Impairment (Delirium)  Goal: Improved Attention and Thought Clarity  Outcome: Ongoing, Not Progressing

## 2023-08-20 NOTE — CONSULTS
"[facility]  Psychiatry  Telepsychiatry Consult Note    Please see previous notes:    Patient agreeable to consultation via telepsychiatry.    Tele-Consultation from Psychiatry started: 8/19/2023 at 8pm    The chief complaint leading to psychiatric consultation is:   brisa to leave this fucking place    This consultation was requested by the attending physician.  The location of the consulting psychiatrist is  [place] .    The patient location is  [facility] TELEMETRY   The patient arrived at the ED at: 8/15/23     Also present with the patient at the time of the consultation: n/a      Patient Identification:   Cali Mabry is a 57 y.o. male.    Patient information was obtained from patient and past medical records.  Patient presented voluntarily to the Emergency Department EMS      Consults  Consult Start Time: 08/19/2023 20:00 CDT  Consult End Time: 08/19/2023 21:40 CDT        Subjective:     History of Present Illness:  No notes on file   8/15/23  ER note  EMS was called due to pt c/o falling 1 week ago, c/o HA and loss of vision    8/2/23 pt seen here        Cerebellar infarct found on ct. Pt refused MRI, verbally abused staff. Left AMA     8/8/23 seen here      C/o vision change, verbally abused staff, left ama     8/9/23 seen at OSH "care everywhere"      Verbally abused staff. Left ama  Subacute to chronic infarct involving the right medial temporal lobe in the right occipital lobe. Remote right pontine and left lateral cerebellar hemisphere infarcts.  Stable ventriculomegaly or hydrocephalus.    Telepsych (Dr Schilling)  Per ED staff- patient has been 2 four different Eds and signed out AMA. He has been quite angry with them through his stay. He apparently had been threatening staff.  On interview, patient would not participate, he started screaming immediately, "I do not need to see a psychiatrist!! I speak three different languages.. what Dx vero  language do you speak? Don't look at me with that dumb " "look on your face."   r/o mood d/o due to CVA  PEC, Geodon or Zyprexa q 6 hrs PRN severe agitation    8/16/23  H&P  In the ED, the patient was hypertensive.  Labs were remarkable for hypercholesterolemia (297).  UDS was positive for amphetamines and benzodiazepines.  CTA head showed Cut off involving a part of the P2 segment of the right PCA,  subacute to chronic infarct involving the right medial temporal lobe in the right occipital lobe & remote right pontine and left lateral cerebellar hemisphere infarcts.  Vascular Neurology was consulted and stated patient can stay at [facility].    TeleNeurology  Patient is a 57 yr old male with DM (uncontrolled), substance abuse who presents as a tele-neurology consult due to concern for stroke. CT brain shows hypodensity in the R PCA territory with cutoff. Patient endorses R facial numbness and peripheral vision abnormality on both sides. No other focal deficits. MRI brain has not been performed.   1)MRI brain to evaluate stroke  2) ECHO pending  3) DAPT x 21 days then monotherapy with plavix 75 mg daily  4) Lipitor 40 mg daily  5) Consider Loop recorder for rhythm monitoring           8/18/23  telepsych (Dr Campbell)  stimulant withdrawal, r/o delirium  benzo and stim use disorders  cont. CEC, olz 5 mg q 8 hrs prn,  Seroquel qhs   consider trazodone   ativan 1 mg q 4 hrs prn CIWA >10    8/19/23  PT  Pt has weakness, impaired endurance, gait instability, impaired balance, decreased lower extremity function, decreased ROM, pain, decreased safety awareness, impaired cognition .     OT  SBA for balance with hand-held assistance as needed (for safety) for ~60 ft functional mobility and standing grooming ADLs at the sink. No LOB. Pt changed his paper scrubs seated EOB with MIN A (d/t material of clothes). Pt was calm and cooperative throughout session. D/w nurse and safety sitter that pt should be ambulating with nursing staff with hand-held assistance daily as well if safe behavior " permits    Hospitalist  On 08/02, he presented to [facility] and had a CT head that showed a new left cerebellar infarct, however left against medical advice.  He returned to  ED on 8/7 and to [facility] ED for worsening vision loss, however left AMA.  He had a brief hospitalization to  on 8/9 for DKA after presenting with similar symptoms as well.  Patient is known to be verbally abusive to staff.  More cooperative this morning, still refusing care at some points and seems the have low threshold for aggressive outbursts and verbal abuse.  Nurse noted his vision is poor  Noted to be urinating all over the place.   Labile mood  Family reports that over last week: pt has repeatedly stated he wants to kill himself  Problem list  subacute to chronic infarct involving the right medial temporal lobe in the right occipital lobe & remote right pontine and left lateral cerebellar hemisphere infarcts.   Diastolic dysfunction  Type 1 DM  HTN HLD  COPD CAD   CUS showed 50-69% stenosis of the right ICA and 1-49% stenosis of the left ICA  UDS+amphetamine, benzo  Up until today he has been very aggressive, agitated and combative.  Still not honest about his vision symptoms and not participatory with full exam.  However, no aggression, severe agitation or combativeness during my visit today.  Appears to have a thinly veiled control of his emotional outbursts today.  -Changed seroquel to trazodone 100mg qhs 8/18 - continue for now  -OK to use Geodon or Zyprexa PO/IM q 6 hours prn severe agitation - last given on 8/17.           ======================================================  Interview with patient  (8/19/23)  Yelling, screaming   wants to get out of this rotten, stinking, fucking place  c/o HA    I asked pt about his living situation prior to this admission   says he was living at home with his mother      Current Medications  Seroquel 100 mg qhs   Given 8/16, 8/17, 8/18  Melatonin 6 mg qhs prn     Given  8/16  Aspirin  Plavix  Atorvastatin  Lovenox  Insulin  Metoprolol  Peg  Mupirocin  Ativan 1 mg IV q 4 hrs prn     Last given  8/19 @ 2019  Olz 5 mg IM q 8 hrs prn agitation    Last given    8/16  @ 1547    8/17 @ 0920    8/19 @ 1907  Prochlorperazine 5 mg iv q 6 hrs prn      Allergies: nkda        Psychiatric History:   Reji = unable to assess    Previous Psychiatric Hospitalizations: unclear    Previous Medication Trials: reji  Previous Suicide Attempts: reji  History of Violence:   aggressive throughout this admission (verbal abuse)  History of Depression:   History of Ila: manic on presentation 8/15/23    History of Auditory/Visual Hallucination: reji      History of Delusions:  reji  Outpatient psychiatrist (current & past): reji      Substance Abuse History:  Tobacco  1 ppd    Etoh  denies current use     illicits  cannabis, meth, heroin      Legal History: Past charges/incarcerations: unclear    Family Psychiatric History: deferred      Medical hx  From review of EPIC  HTN  HLD  CAD/MI  Carotid atherosclerosis  CHF  Pulmonary nodule  DM1  DKA  Hep C chronic]  Pancreatitis, acute on chronic  Right shoulder spur  Anemia  L1 compression fx  CVAs     Surgical Hx  Cardiac stent  Right shoulder spur      labs/Diagnostics  7/22/23  CT of head  Moderate cerebral atrophy and chronic small vessel ischemic changes are present.  There is a nonacute left corona radiata lacunar infarct (series 2 axial image 16).  There is no acute intracranial hemorrhage, territorial infarct or mass effect, or midline shift.      8/2/23  CT of head  There is ventriculomegaly.  No blood products are seen.  The left lateral cerebellum demonstrates a wedge-shaped hypodensity new when compared to 07/22/2023.  Normal pressure hydrocephalus cannot be excluded    8/15/23  CTA head neck  Subacute to chronic infarct involving the right medial temporal lobe in the right occipital lobe. Remote right pontine and left lateral cerebellar hemisphere infarcts.   If persistent neurological abnormality, consider MRI of the brain with diffusion-weighted sequencing..          8/16/23  A1c  9.5    8/17/23  RBC 4.36 hgb 11.2 hct 35.8  Low MCH, low MCHC  glc 338  BUN 22  na 132      Social History:  Developmental/Childhood:unable to review  Education: unable to assess  Employment: unable to assess  Relationship status:[facility]  Housing status: pt says he had been living with his mother prior to admission   Mil hx: none  Access to gun: [facility]  Zoroastrian: [facility]  rec activities/hobbies: drug use    Psychiatric Mental Status Exam:  Arousal:  agitated  Sensorium/Orientation: oriented to person  appearance: disheveled, wearing glasses  Behavior/Cooperation: wide based stance when he got out of bed, appeared antalgic  Speech: pressured, loud, non slurred  Language: cussing  Mood: not fine  Affect: agitated, verbally abusive  Thought Process:  [facility]  Thought Content: agitated, mean  Auditory hallucinations: not per EPIc notes  Visual hallucinations: not per EPIC notes  Paranoia:mistrusting, accusatory and demeaning of staff   Delusions: no  Suicidal ideation:  [facility]  Homicidal ideation: [facility]  Attention/Concentration: poor  Memory:  not tested  Fund of Knowledge: not tested  Abstract reasoning: not tested  Insight:  absent  Judgment: extremely  poor                Past Medical History:   Past Medical History:   Diagnosis Date    Acute on chronic pancreatitis 04/03/2016    Addiction to drug     CHF (congestive heart failure)     DKA (diabetic ketoacidoses) 02/2020    DM (diabetes mellitus), type 1     HCV (hepatitis C virus)     high VL     HTN (hypertension)     Hx of psychiatric care     Hypomagnesemia 05/25/2020    IVDU (intravenous drug user)     Heroin    Pancreatitis     Psychiatric problem     Substance abuse       Laboratory Data:   Labs Reviewed   CBC W/ AUTO DIFFERENTIAL - Abnormal; Notable for the following components:       Result Value    Hemoglobin 12.8 (*)     MCH 25.9 (*)     MCHC 31.4  (*)     RDW 16.7 (*)     MPV 8.8 (*)     Immature Granulocytes 0.6 (*)     All other components within normal limits   COMPREHENSIVE METABOLIC PANEL - Abnormal; Notable for the following components:    Glucose 140 (*)     Total Protein 9.0 (*)     Alkaline Phosphatase 152 (*)     All other components within normal limits   TSH - Abnormal; Notable for the following components:    TSH 0.176 (*)     All other components within normal limits   LIPID PANEL - Abnormal; Notable for the following components:    Cholesterol 297 (*)     Triglycerides 691 (*)     HDL/Cholesterol Ratio 15.2 (*)     Total Cholesterol/HDL Ratio 6.6 (*)     All other components within normal limits   URINALYSIS, REFLEX TO URINE CULTURE - Abnormal; Notable for the following components:    Specific Gravity, UA >1.030 (*)     Protein, UA 2+ (*)     Glucose, UA 3+ (*)     All other components within normal limits    Narrative:     Specimen Source->Urine   DRUG SCREEN PANEL, URINE EMERGENCY - Abnormal; Notable for the following components:    Benzodiazepines Presumptive Positive (*)     Amphetamine Screen, Ur Presumptive Positive (*)     All other components within normal limits    Narrative:     Specimen Source->Urine   ACETAMINOPHEN LEVEL - Abnormal; Notable for the following components:    Acetaminophen (Tylenol), Serum <3.0 (*)     All other components within normal limits   CBC W/ AUTO DIFFERENTIAL - Abnormal; Notable for the following components:    RBC 4.41 (*)     Hemoglobin 11.4 (*)     Hematocrit 35.0 (*)     MCV 79 (*)     MCH 25.9 (*)     RDW 16.6 (*)     Immature Granulocytes 0.8 (*)     All other components within normal limits   COMPREHENSIVE METABOLIC PANEL - Abnormal; Notable for the following components:    Sodium 129 (*)     Potassium 5.2 (*)     Glucose 631 (*)     BUN 24 (*)     Albumin 3.0 (*)     All other components within normal limits    Narrative:     GLU critical result(s) called and verbal readback obtained from Christina    RITA Terry by [facility] 08/16/2023 07:37   BETA - HYDROXYBUTYRATE, SERUM - Abnormal; Notable for the following components:    Beta-Hydroxybutyrate 2.3 (*)     All other components within normal limits   BASIC METABOLIC PANEL - Abnormal; Notable for the following components:    Sodium 129 (*)     Glucose 632 (*)     BUN 26 (*)     All other components within normal limits    Narrative:       Glucose critical result(s) called and verbal readback obtained from   [address]ian [address] by [facility] 08/16/2023 08:52   POCT GLUCOSE - Abnormal; Notable for the following components:    POCT Glucose >500 (*)     All other components within normal limits   POCT GLUCOSE - Abnormal; Notable for the following components:    POCT Glucose >500 (*)     All other components within normal limits   POCT GLUCOSE - Abnormal; Notable for the following components:    POCT Glucose >500 (*)     All other components within normal limits   POCT GLUCOSE - Abnormal; Notable for the following components:    POCT Glucose >500 (*)     All other components within normal limits   POCT GLUCOSE - Abnormal; Notable for the following components:    POCT Glucose 362 (*)     All other components within normal limits   PROTIME-INR   ALCOHOL,MEDICAL (ETHANOL)   ACETAMINOPHEN LEVEL   ALCOHOL,MEDICAL (ETHANOL)   T4, FREE   URINALYSIS MICROSCOPIC    Narrative:     Specimen Source->Urine   MAGNESIUM   HEMOGLOBIN A1C   MAGNESIUM   PHOSPHORUS   POCT GLUCOSE, HAND-HELD DEVICE   POCT GLUCOSE, HAND-HELD DEVICE   POCT GLUCOSE MONITORING CONTINUOUS   POCT GLUCOSE MONITORING CONTINUOUS       Neurological History:  Seizures: reji    Head trauma: reji    Allergies:   Review of patient's allergies indicates:  No Known Allergies    Medications in ER:   Medications   aspirin chewable tablet 81 mg (81 mg Oral Given 8/19/23 0903)   atorvastatin tablet 20 mg (20 mg Oral Given 8/19/23 0903)   sodium chloride 0.9% flush 10 mL (has no administration in time range)   albuterol-ipratropium 2.5 mg-0.5 mg/3  mL nebulizer solution 3 mL (has no administration in time range)   melatonin tablet 6 mg (6 mg Oral Given 8/19/23 2123)   prochlorperazine injection Soln 5 mg (has no administration in time range)   polyethylene glycol packet 17 g (17 g Oral Not Given 8/19/23 0908)   bisacodyL suppository 10 mg (has no administration in time range)   acetaminophen tablet 650 mg (has no administration in time range)   simethicone chewable tablet 80 mg (has no administration in time range)   aluminum-magnesium hydroxide-simethicone 200-200-20 mg/5 mL suspension 30 mL (has no administration in time range)   acetaminophen tablet 650 mg (650 mg Oral Given 8/19/23 2123)   naloxone 0.4 mg/mL injection 0.02 mg (has no administration in time range)   potassium bicarbonate disintegrating tablet 50 mEq (has no administration in time range)   potassium bicarbonate disintegrating tablet 35 mEq (has no administration in time range)   potassium bicarbonate disintegrating tablet 60 mEq (has no administration in time range)   magnesium oxide tablet 800 mg (has no administration in time range)   magnesium oxide tablet 800 mg (has no administration in time range)   potassium, sodium phosphates 280-160-250 mg packet 2 packet (has no administration in time range)   potassium, sodium phosphates 280-160-250 mg packet 2 packet (has no administration in time range)   potassium, sodium phosphates 280-160-250 mg packet 2 packet (has no administration in time range)   glucose chewable tablet 16 g (has no administration in time range)   glucose chewable tablet 24 g (has no administration in time range)   dextrose 50% injection 12.5 g (has no administration in time range)   dextrose 50% injection 25 g (has no administration in time range)   glucagon (human recombinant) injection 1 mg (has no administration in time range)   OLANZapine injection 5 mg (5 mg Intramuscular Given 8/19/23 1907)   sodium chloride 0.9% flush 10 mL (has no administration in time range)    sodium chloride 0.9% bolus 500 mL 500 mL (has no administration in time range)   labetaloL injection 10 mg (has no administration in time range)   mupirocin 2 % ointment ( Nasal Not Given 8/19/23 2100)   clopidogreL tablet 75 mg (75 mg Oral Given 8/19/23 0903)   glucose chewable tablet 16 g (has no administration in time range)   glucose chewable tablet 24 g (has no administration in time range)   glucagon (human recombinant) injection 1 mg (has no administration in time range)   ondansetron disintegrating tablet 4 mg (4 mg Oral Given 8/16/23 1019)   insulin aspart U-100 pen 7 Units (7 Units Subcutaneous Given 8/19/23 1606)   metoprolol succinate (TOPROL-XL) 24 hr tablet 25 mg (25 mg Oral Given 8/19/23 0903)   QUEtiapine tablet 100 mg (100 mg Oral Not Given 8/19/23 2123)   enoxaparin injection 40 mg (40 mg Subcutaneous Given 8/19/23 1606)   insulin aspart U-100 pen 1-10 Units (1 Units Subcutaneous Given 8/19/23 2124)   insulin aspart U-100 pen 1-10 Units (5 Units Subcutaneous Given 8/17/23 2126)   LORazepam injection 1 mg (1 mg Intravenous Not Given 8/19/23 2019)   insulin detemir U-100 (Levemir) pen 25 Units (has no administration in time range)   iohexoL (OMNIPAQUE 350) injection 80 mL (80 mLs Intravenous Given 8/15/23 2052)   aspirin tablet 325 mg (325 mg Oral Given 8/15/23 2337)   insulin detemir U-100 (Levemir) pen 20 Units (20 Units Subcutaneous Given 8/16/23 0617)   insulin aspart U-100 pen 20 Units (20 Units Subcutaneous Given 8/16/23 0745)   insulin detemir U-100 (Levemir) pen 5 Units (5 Units Subcutaneous Given 8/19/23 1202)       Medications at home:     No new subjective & objective note has been filed under this hospital service since the last note was generated.      Assessment - Diagnosis - Goals:     Assessment - Diagnosis - Goals:   56 yo male who came to [facility] on [facility]/15/23 for eval of HA, vision change.  Reported falling 1 wk prior.  CT of head:  Remote right pontine and left lateral cerebellar  hemisphere infarcts.  Stable ventriculomegaly or hydrocephalus. Subacute to chronic infarct involving the right medial temporal lobe in the right occipital lobe.     8/15/23  Telepsych #1 Dr Schilling  Dx vero  Rec. PRN Zyprexa or Geodon, PEC     8/18/23   Telepsych #1 (Dr Campbell)  stimulant withdrawal, r/o delirium, benzo and stimulant use d/o  cont. CEC, olz 5 mg q 8 hrs prn,  Seroquel qhs     if using lots of olz, consider trazodone   ativan 1 mg q 4 hrs prn CIWA >10    8/19/23  telepsych #3 (Dr Warner)  Pt agitated, belligerent, angry Uncooperative  Hospital course, MARs, labs, imaging reviewed  Gravely disabled and unable to care for self, potential danger to self/others  Recs are below      Diagnosis/Impression: *  Diagnoses/DSM5  Unspecified impulse control disorder >> likely multifactorial  Serial strokes  [pseudobulbar affect]  NPH      falls, urinary incontinence, ventriculomegaly   Neurodegeneration    Stimulant and benzo withdrawal   r/o heroin or other opioid withdrawal  r/o agitated delirium  r/o antisocial PD    medical conditions as above            Rec:   1)legal  Continue PEC hold  for Grave Disability    Patient should not be permitted to sign out AMA    Patient is gravely disabled and presents ongoing risk of danger to self and others.    Lacking capacity for medical decision making  as manifested by inability to care for own medical needs chronically, and for lack of ability to understand current conditions or to appreciate risks/benefits of treatments.    Recommend Guardian/Conservator proceedings per [place] protocols    Defer to surrogate decision maker if one can be identified    Continue 1:1 sitter  Security presence to prevent elopement        2)agitation    Begin standing doses of Thorazine  25 mg po tid if patient will take oral  Otherwise:  25 mg IM x one,  then 25 mg IM q 2 hrs until patient is calm    begin gabapentin 400 mg po tid   for  possible benzo withdrawal agitation  component  and for seizure  prophylaxis      PRN for breakthrough agitation  1st line  clonidine 0.1mg qd to bid  2nd line  lamotrigine 25 mg qhs x 2 weeks, then 50 mg qhs      Continue melatonin at bedtime      DC Seroquel 100 mg qhs     DC PRN Ativan   Goal is to cover benzo withdrawal with gabapentin trial    DC Prn olanzapine   High risk of destabilizing glucose control, risk of DVT/PE,  risk of stroke, ileus.   High anticholinergic load with risk of delirium.    Do not begin trazodone >>     Risk of falls, dizziness, ortho hypotension   With Plavix/lovenox, serotonergic agents will increase risk of acute GI and/or   Intracranial bleed    Do not begin PRN Geodon    Can induce/worsen vero and agitation   Do not begin Depakote  >> risk of worsening ventriculomegaly  Do not begin Trileptal or Tegretol  > risks of coronary adverse effects          3)neurology   Re-consult neurology re: likely NPH  Hx of falls, and per hospitalist  note today: pt is urinating everywhere  Suggesting possible urinary dyscontrol of NPH    Also invite neurology opinion re: use of Nuedexta for PBA  (is this too risky in patient with CAD, CHF)          4)serial strikes in patient with Hep C  r/o Cryoglobulinemia  r/o anti phospholipid syndrome      5)diagnostics  RPR, HIV  Iron panel as  low iron can cause akathisia, violence   Hep C viral load  B1, B12 levels  TSH if not done  MRI of brain if pt would ever consent      6)disposition  Pt is inappropriate for inpatient psych placement at this time    Need  further neurology input  Need cardiac clearance    Proof that patient is toileting, feeding, bathing self without need for assistance; otherwise, inpatient psych units will likely decline pt based on medical acuity.      Agree with Dr Campbell's suggestion that pt would benefit form long term,  intensive residential substance abuse treatment but this would need to be Court Ordered as patient is currently without any insight into the severity  of his substance abuse problems.        Time with patient: 8 min    More than 50% of the time was spent counseling/coordinating care    Consulting clinician was informed of the encounter and consult note.    Consultation ended: 8/19/2023 at 940pm    Tabatha Warner MD   Psychiatry  [facility]

## 2023-08-20 NOTE — NURSING
Called security d/t patient escalating verbally. Security at bedside. Dr Jackman notified via securechat.

## 2023-08-20 NOTE — ASSESSMENT & PLAN NOTE
-Admitted to inpatient status  -Presented for evaluation of backwards fall a week ago and vision changes since then  -Noted CT head (8/2) showing a new left cerebellar infarct.  Patient left AMA at that time  -In ER this admit, CTA head showed cut off involving a part of the P2 segment of the right PCA, subacute to chronic infarct involving the right medial temporal lobe in the right occipital lobe & remote right pontine and left lateral cerebellar hemisphere infarcts.    -Suspect related to substance abuse - noted amphetamines in UDS  -A1c 9.5   , HDL 45,   LDL measurement invalid  -Echo showedEF 55-60%, diastolic dysfunction, negative bubble study.  -CUS showed 50-69% stenosis of the right ICA and 1-49% stenosis of the left ICA  -Consulted tele-neurology, PT, OT and SLP - input appreciated  -PT/OT recommend HH with PT, OT and SLP at discharge.  -Clinically improving - states vision symptoms have resolved, but nurses (and family) note that his vision is very poor.  He does not participate with acuity testing.  Technically he is medically stable for discharge from hospital, however he needs inpatient psychiatric hospitalization and we not at all confident he can safely navigate in that environment yet  -Continue DAPT (asa 81 + plavix 75) x 21 days and then stop aspirin.  -Continue statin  -Continue to pursue normotension  -Discharge pending ability to safely navigate around psych unit or placement in a medical psych unit, or alternatively clearance from psychiatry to lift PEC/CEC and discharge home with HH.  At this time he is not medically cleared for an inpatient psych unit pending further neurology assessment for possible NPH as well as psych recommendations for judicial commitment / court appointed guardianship.

## 2023-08-20 NOTE — NURSING
Patient is agitated this morning, yelling and cursing. Walking around the room, threatening to leave. Uncooperative. Asking for a phone to call for ride home. Zyprexa ordered but not due at this time

## 2023-08-20 NOTE — NURSING
"At shift change, pt was standing by his door way screaming and demanding to use the telephone. Pt then began to threaten to leave the hospital if staff did not provide him a telephone. When staff attempted to explain plan of care to pt, he began to threaten violence on staff. He stated "get the F*ck out of here before I kick your as*!" Security was called for assistance. Pt was then given zyprexa IM to help him interact better with his environment. Pt witnessed to ambulate from bed to door on his own, slight unsteadiness, would benefit from use of a walker if pt was less agitated but other wise ambulated ok.   "

## 2023-08-20 NOTE — PT/OT/SLP PROGRESS
Physical Therapy      Patient Name:  Cali Mabry   MRN:  [id number]  [id number]  Patient not seen today secondary to Increased agitation and security present. Will follow-up tomorrow.

## 2023-08-20 NOTE — NURSING
Pt slept for a couple of hrs. Was calm but woke up angry and again agitated. Pt screaming that he wanted to leave ASAP, demanding to be let out, again threatening to leave and refusing to take redirection. For staff and pt safety, NP gave order to give zyprexa now (due at 0300). Security called for assistance. Pt refused redirection and began to kick and flail his arms when approached with injection. Pt given IM zyprexa on thigh. No harm towards staff or pt.

## 2023-08-20 NOTE — PROGRESS NOTES
[facility] Medicine  Progress Note    Patient Name: Cali Mabry  MRN: [id number]  Patient Class: IP- Inpatient   Admission Date: 8/15/2023  Length of Stay: 5 days  Attending Physician: Cullen Jackman MD  Primary Care Provider: Lawanda Flores NP        Subjective:     Principal Problem:Stroke        HPI:  This is a 57-year-old male with a past medical history of type 1 diabetes, COPD (not on O2), CAD, hypertension, hyperlipidemia, substance use, mood disorder who presents with blurry vision.    Patient had multiple ED presentations for headaches, blurry vision, eye pain this month.  On 08/02, he presented to [facility] and had a CT head that showed a new left cerebellar infarct, however left against medical advice.  He returned to  ED on 8/7 and to [facility] ED for worsening vision loss, however left AMA.  He had a brief hospitalization to  on 8/9 for DKA after presenting with similar symptoms as well.  Patient is known to be verbally abusive to staff.    In the ED, the patient was hypertensive.  Labs were remarkable for hypercholesterolemia (297).  UDS was positive for amphetamines and benzodiazepines.  CTA head showed Cut off involving a part of the P2 segment of the right PCA,  subacute to chronic infarct involving the right medial temporal lobe in the right occipital lobe & remote right pontine and left lateral cerebellar hemisphere infarcts.  Vascular Neurology was consulted and stated patient can stay at [facility].  Psychiatry was consulted and recommended pec giving grave disability.  Patient was given aspirin 325 mg and was admitted for further management.      Overview/Hospital Course:  No notes on file    Interval History: No acute events overnight.  Remains severely agitated, yelling profanities and threatened to hit me during today's visit.  Required thorazine and security presence this morning so far.  Vision is obviously significantly compromised - could not see his glasses laying on  his abdomen.      Objective:     Vital Signs (Most Recent):  Temp: 98.6 °F (37 °C) (08/20/23 0757)  Pulse: 80 (08/20/23 0757)  Resp: 16 (08/20/23 0757)  BP: 112/65 (08/20/23 0757)  SpO2: 97 % (08/20/23 0757) Vital Signs (24h Range):  Temp:  [97.4 °F (36.3 °C)-98.6 °F (37 °C)] 98.6 °F (37 °C)  Pulse:  [80-89] 80  Resp:  [16-18] 16  SpO2:  [97 %] 97 %  BP: (112-140)/(65-67) 112/65     Weight: 68 kg (149 lb 14.6 oz)  Body mass index is 22.79 kg/m².  No intake or output data in the 24 hours ending 08/20/23 1218        Physical Exam  Vitals and nursing note reviewed.   Constitutional:       General: He is not in acute distress.     Appearance: Normal appearance. He is not ill-appearing.   HENT:      Head: Normocephalic and atraumatic.      Nose: Nose normal.      Mouth/Throat:      Mouth: Mucous membranes are moist.   Eyes:      Extraocular Movements: Extraocular movements intact.      Comments: Vision is quite compromised - but does not participate in field testing or acuity   Cardiovascular:      Rate and Rhythm: Normal rate and regular rhythm.      Pulses: Normal pulses.      Heart sounds: No murmur heard.  Pulmonary:      Effort: Pulmonary effort is normal. No respiratory distress.   Abdominal:      General: Abdomen is flat.      Palpations: Abdomen is soft.      Tenderness: There is no abdominal tenderness.   Skin:     General: Skin is warm.      Capillary Refill: Capillary refill takes less than 2 seconds.   Neurological:      Mental Status: He is alert.      Comments: Alert, does not answer orienting questions or participate with neurological evaluation.  MUCH more agitated, combative and threatening physical attack to me today.  Moves all extremities.  Able to sit self up on bed promptly after threatening to punch me.   Psychiatric:      Comments: Extremely labile mood with aggression and threats of punching me today.  Yelling profanities, agitated and not-cooperative.  Family states that within the last week he  has said several times he wishes to kill himself.             Significant Labs: All pertinent labs within the past 24 hours have been reviewed.    Significant Imaging: I have reviewed all pertinent imaging results/findings within the past 24 hours.      Assessment/Plan:      * Stroke  -Admitted to inpatient status  -Presented for evaluation of backwards fall a week ago and vision changes since then  -Noted CT head (8/2) showing a new left cerebellar infarct.  Patient left AMA at that time  -In ER this admit, CTA head showed cut off involving a part of the P2 segment of the right PCA, subacute to chronic infarct involving the right medial temporal lobe in the right occipital lobe & remote right pontine and left lateral cerebellar hemisphere infarcts.    -Suspect related to substance abuse - noted amphetamines in UDS  -A1c 9.5   , HDL 45,   LDL measurement invalid  -Echo showedEF 55-60%, diastolic dysfunction, negative bubble study.  -CUS showed 50-69% stenosis of the right ICA and 1-49% stenosis of the left ICA  -Consulted tele-neurology, PT, OT and SLP - input appreciated  -PT/OT recommend HH with PT, OT and SLP at discharge.  -Clinically improving - states vision symptoms have resolved, but nurses (and family) note that his vision is very poor.  He does not participate with acuity testing.  Technically he is medically stable for discharge from hospital, however he needs inpatient psychiatric hospitalization and we not at all confident he can safely navigate in that environment yet  -Continue DAPT (asa 81 + plavix 75) x 21 days and then stop aspirin.  -Continue statin  -Continue to pursue normotension  -Discharge pending ability to safely navigate around psych unit or placement in a medical psych unit, or alternatively clearance from psychiatry to lift PEC/CEC and discharge home with HH.  At this time he is not medically cleared for an inpatient psych unit pending further neurology assessment for  possible NPH as well as psych recommendations for judicial commitment / court appointed guardianship.    Substance induced mood disorder  -Noted agitation, combativeness and grandiosity of thoughts on admit  -Seen by tele-psych in ER and given provisional diagnosis of Ila and r/o mood disorder  -Placed under PEC and recommended inpatient psych placement once medically stable  -Patient remains with severe aggression, agitation and combativeness.  Today threatened to punch me.  -Noting ventriculomegaly on CT - could NPH be a contributor to his difficulty ambulating, emotional lability and urinary incontinence?  Also, is it safe to use neudexta for him?  Will re-consult tele-neurology with these questions.    -Per psych recs 8/19 will rule out cryoglobulinemia (cryoglobulin, BEAR, dsDNA, complement, SSa, SSb, ANCA, RF, ESR, CRP, Sm/RNP), HCV viral load, B1, B12, TSH.  Once more calm and participatory can order MRI brain.    -Per psych recs 8/19:   A. Start thorazine 25mg po tid if will take oral.  Otherwise can use thorazine 25mg IM q2h until patient is calm (STILL NOT CALM).     B. Start gabapentin 400mg po tid for possible benzo withdrawal agitation component.     C. Add clonidine 0.1mg bid prn breakthrough agitation (1st line)   D. Add lamotrigine 25mg qhs x 2 weeks then 50mg qhs.   E: stop seroquel 100mg qhs.      F. Stop PRN ativan (goal is to cover possible benzo withdrawal with gabapentin)   G. Do not start: Trazodone (risk of falls and orthostatic hypotension), Geodon (can worsen mainia and agitation), Depakote (can worsen ventriculomegaly), Trileptal or Tegratol (risk of coronary adverse effects    -Security called to bedside this morning, 8/20.  Restraints are ordered if needed, but thus far have not need to be applied.      -Ultimately he needs inpatient psychiatric care, but is not medically cleared at this time.  Must be able to self toilet, feed and bath without need for assistance.  Further, I agree  with psychiatry that he lacks capacity for medical decision making as well as recommendation for pursuing guardianship/conservatoryship.  Will alert case management and discuss with legal tomorrow    -Re-consult tele-psych today as pursuits of conservatorship require daily tele-psych evaluations    Impulse control disorder in adult  -Treatment as above.      Noncompliance with medication regimen  -Noted history as well as illicit substance use and prior AMA  -Counseled     Polysubstance use disorder  -Urine drug screen positive for amphetamines and benzodiazepines  -Patient denies use  -Likely contributor to medical illness as well as mood alterations and combativeness.  -Counseled on cessation    Type 1 diabetes  -Last A1c 10.4 1/27/23  -A1c now 9.5  -Blood sugars are better so far today  -Refuses labs and accu-checks  -Continue detemir to 25 units qd and lispro 7 units tidwm  -Continue moderate dose SSI ac/hs    CAD (coronary artery disease)  -Denies chest pain  -Continue aspirin and statin  -Monitor on telemetry    COPD (chronic obstructive pulmonary disease)  -No wheezing at this time  -P.rchastity Cam.    Hyperlipidemia  -Continue statin.    Primary hypertension  -BP normal to moderately elevated  -Given subacute nature of his stroke - pursuing normotension  -No BP meds on home list  -Given CAD history added metoprolol 8/16 - continue for now    VTE Risk Mitigation (From admission, onward)         Ordered     enoxaparin injection 40 mg  Every 24 hours         08/16/23 1155     IP VTE LOW RISK PATIENT  Once         08/16/23 0025     Place sequential compression device  Until discontinued         08/16/23 0025                Discharge Planning   ROLA:      Code Status: Full Code   Is the patient medically ready for discharge?:     Reason for patient still in hospital (select all that apply): Treatment  Discharge Plan A: [facility]                  Cullen Jackman MD  Department of Hospital Medicine   [facility] - Telemetry

## 2023-08-21 LAB
C3 SERPL-MCNC: 156 MG/DL (ref 50–180)
C4 SERPL-MCNC: 33 MG/DL (ref 11–44)
POCT GLUCOSE: 250 MG/DL (ref 70–110)
POCT GLUCOSE: 294 MG/DL (ref 70–110)
POCT GLUCOSE: 333 MG/DL (ref 70–110)
POCT GLUCOSE: 86 MG/DL (ref 70–110)
RHEUMATOID FACT SERPL-ACNC: 14 IU/ML (ref 0–15)
VIT B12 SERPL-MCNC: 184 PG/ML (ref 210–950)

## 2023-08-21 PROCEDURE — 99900035 HC TECH TIME PER 15 MIN (STAT)

## 2023-08-21 PROCEDURE — G0425 PR INPT TELEHEALTH CONSULT 30M: ICD-10-PCS | Mod: 95,,, | Performed by: PSYCHIATRY & NEUROLOGY

## 2023-08-21 PROCEDURE — 25000003 PHARM REV CODE 250: Performed by: HOSPITALIST

## 2023-08-21 PROCEDURE — 97110 THERAPEUTIC EXERCISES: CPT

## 2023-08-21 PROCEDURE — 97116 GAIT TRAINING THERAPY: CPT

## 2023-08-21 PROCEDURE — 25000003 PHARM REV CODE 250: Performed by: STUDENT IN AN ORGANIZED HEALTH CARE EDUCATION/TRAINING PROGRAM

## 2023-08-21 PROCEDURE — G0425 INPT/ED TELECONSULT30: HCPCS | Mod: 95,,, | Performed by: PSYCHIATRY & NEUROLOGY

## 2023-08-21 PROCEDURE — 97535 SELF CARE MNGMENT TRAINING: CPT

## 2023-08-21 PROCEDURE — 21400001 HC TELEMETRY ROOM

## 2023-08-21 PROCEDURE — 63600175 PHARM REV CODE 636 W HCPCS: Performed by: HOSPITALIST

## 2023-08-21 RX ORDER — INSULIN ASPART 100 [IU]/ML
9 INJECTION, SOLUTION INTRAVENOUS; SUBCUTANEOUS
Status: DISCONTINUED | OUTPATIENT
Start: 2023-08-21 | End: 2023-08-22 | Stop reason: HOSPADM

## 2023-08-21 RX ORDER — LANOLIN ALCOHOL/MO/W.PET/CERES
1 CREAM (GRAM) TOPICAL DAILY
Status: DISCONTINUED | OUTPATIENT
Start: 2023-08-21 | End: 2023-08-22 | Stop reason: HOSPADM

## 2023-08-21 RX ORDER — CYANOCOBALAMIN 1000 UG/ML
1000 INJECTION, SOLUTION INTRAMUSCULAR; SUBCUTANEOUS WEEKLY
Status: DISCONTINUED | OUTPATIENT
Start: 2023-08-28 | End: 2023-08-22 | Stop reason: HOSPADM

## 2023-08-21 RX ORDER — CYANOCOBALAMIN 1000 UG/ML
1000 INJECTION, SOLUTION INTRAMUSCULAR; SUBCUTANEOUS DAILY
Status: DISCONTINUED | OUTPATIENT
Start: 2023-08-21 | End: 2023-08-22 | Stop reason: HOSPADM

## 2023-08-21 RX ADMIN — ACETAMINOPHEN 650 MG: 325 TABLET ORAL at 12:08

## 2023-08-21 RX ADMIN — CHLORPROMAZINE HYDROCHLORIDE 25 MG: 25 TABLET, FILM COATED ORAL at 08:08

## 2023-08-21 RX ADMIN — INSULIN DETEMIR 5 UNITS: 100 INJECTION, SOLUTION SUBCUTANEOUS at 12:08

## 2023-08-21 RX ADMIN — LAMOTRIGINE 25 MG: 25 TABLET ORAL at 08:08

## 2023-08-21 RX ADMIN — ENOXAPARIN SODIUM 40 MG: 40 INJECTION SUBCUTANEOUS at 05:08

## 2023-08-21 RX ADMIN — INSULIN ASPART 9 UNITS: 100 INJECTION, SOLUTION INTRAVENOUS; SUBCUTANEOUS at 05:08

## 2023-08-21 RX ADMIN — METOPROLOL SUCCINATE 25 MG: 25 TABLET, EXTENDED RELEASE ORAL at 08:08

## 2023-08-21 RX ADMIN — FERROUS SULFATE TAB 325 MG (65 MG ELEMENTAL FE) 1 EACH: 325 (65 FE) TAB at 12:08

## 2023-08-21 RX ADMIN — GABAPENTIN 400 MG: 400 CAPSULE ORAL at 08:08

## 2023-08-21 RX ADMIN — ACETAMINOPHEN 650 MG: 325 TABLET ORAL at 08:08

## 2023-08-21 RX ADMIN — INSULIN ASPART 4 UNITS: 100 INJECTION, SOLUTION INTRAVENOUS; SUBCUTANEOUS at 05:08

## 2023-08-21 RX ADMIN — ACETAMINOPHEN 650 MG: 325 TABLET ORAL at 05:08

## 2023-08-21 RX ADMIN — ATORVASTATIN CALCIUM 20 MG: 10 TABLET, FILM COATED ORAL at 08:08

## 2023-08-21 RX ADMIN — ACETAMINOPHEN 650 MG: 325 TABLET ORAL at 10:08

## 2023-08-21 RX ADMIN — INSULIN ASPART 8 UNITS: 100 INJECTION, SOLUTION INTRAVENOUS; SUBCUTANEOUS at 08:08

## 2023-08-21 RX ADMIN — INSULIN ASPART 6 UNITS: 100 INJECTION, SOLUTION INTRAVENOUS; SUBCUTANEOUS at 12:08

## 2023-08-21 RX ADMIN — CHLORPROMAZINE HYDROCHLORIDE 25 MG: 25 TABLET, FILM COATED ORAL at 03:08

## 2023-08-21 RX ADMIN — ASPIRIN 81 MG CHEWABLE TABLET 81 MG: 81 TABLET CHEWABLE at 08:08

## 2023-08-21 RX ADMIN — INSULIN ASPART 7 UNITS: 100 INJECTION, SOLUTION INTRAVENOUS; SUBCUTANEOUS at 08:08

## 2023-08-21 RX ADMIN — CYANOCOBALAMIN 1000 MCG: 1000 INJECTION, SOLUTION INTRAMUSCULAR; SUBCUTANEOUS at 03:08

## 2023-08-21 RX ADMIN — CLOPIDOGREL BISULFATE 75 MG: 75 TABLET, FILM COATED ORAL at 08:08

## 2023-08-21 NOTE — PLAN OF CARE
Problem: Adult Inpatient Plan of Care  Goal: Plan of Care Review  Outcome: Ongoing, Progressing     Problem: Adult Inpatient Plan of Care  Goal: Absence of Hospital-Acquired Illness or Injury  Outcome: Ongoing, Progressing     Problem: Cognitive Impairment (Stroke, Ischemic/Transient Ischemic Attack)  Goal: Optimal Cognitive Function  Outcome: Ongoing, Progressing      Interventional Radiology Progress Note:    Patient: Saleem Jewell Date: 3/23/2023  : 1932 Attending: MD Lorna Silverman APNP   89 year old male     Patient Active Problem List   Diagnosis   • Essential hypertension, benign   • Unspecified hyperplasia of prostate with urinary obstruction and other lower urinary tract symptoms (LUTS)   • Chronic obstructive asthma, unspecified   • Peripheral vascular disease, unspecified   • Diabetic polyneuropathy (CMD)   • BCC mid back and right arm 2003   • Dysplastic nevi: right buttocks, chest, back, arms    • Leg edema   • History  of basal cell carcinoma   • Actinic keratosis   • Other seborrheic keratosis   • Lentigines   • Benign neoplasm of skin of trunk, except scrotum   • Combined forms of age-related cataract of both eyes   • Milium   • Ataxia   • Leg pain   • Back pain   • Scar   • History of basal cell cancer   • Sebaceous cyst   • Cherry angioma   • Gout   • Multiple nevi   • SK (seborrheic keratosis)   • Personal history of skin cancer   • RAD (reactive airway disease)   • Diabetes mellitus (CMD)   • Pericarditis   • Congestive heart failure (CMD)   • Cough   • Syncope   • Branch retinal vein occlusion of left eye with macular edema   • Hyperopia with presbyopia of both eyes   • Dry eye syndrome of both eyes   • Atrial fibrillation, chronic (CMD)   • Type 2 diabetes mellitus without retinopathy (CMD)   • Melena   • Blood in stool   • Acute GI hemorrhage   • Rectal bleed   • GI bleeding     HPI: Saleem Jewell is a 89 year old male with multiple medical problems (as above). Patient admitted to [facility] through the ED 3/18/23 with GIB. IR consulted for embolization.     Reason for F/U:  -- S/P embolization of distal left sigmoid branches 3/19/23, by Dr. Yee.   -- S/P empiric segmental embolization of proximal sigmoid sement of FAYE 3/22/23, Dr. Fenton    Subjective: Patient states he's feeling well today. Denies SOB, CP, fever,  chills, n/v.     Objective:     Visit Vitals  /64 (BP Location: LUE - Left upper extremity, Patient Position: Semi-Jade's)   Pulse 80   Temp 97.7 °F (36.5 °C) (Oral)   Resp 18   Ht 5' 7\" (1.702 m)   Wt 65.6 kg (144 lb 10 oz)   SpO2 99%   BMI 22.65 kg/m²     Physical Examination:  General- Awake/alert in NAD. Appears chronically ill.   HEENT- Atraumatic. PERRL. Anicteric. Neck is supple.  Pulmonary- Normal respiratory effort. On RA.   Extremities- Right groin (access site) without ecchymoses/hematoma/shadowing. + right pedal pulse.   Neurologic/Psychiatric- [place]. A&O to person only. Not answering most questions. Suzan spontaneously. No appreciable focal deficit.     Skin- Warm and dry to touch.     Labs reviewed.     Current Facility-Administered Medications   Medication Dose Route Frequency Provider Last Rate Last Admin   • atenolol (TENORMIN) tablet 50 mg  50 mg Oral Daily Jeremy F Siegrist, MD   50 mg at 03/23/23 0837   • aspirin chewable 81 mg  81 mg Oral Daily Jeremy F Siegrist, MD   81 mg at 03/23/23 0839   • sodium chloride (NORMAL SALINE) 0.9 % bolus 250 mL  250 mL Intravenous PRN Tomás Fenton MD       • atropine injection 0.5 mg  0.5 mg Intravenous PRN Tomás Fenton MD       • albuterol inhaler 2 puff  2 puff Inhalation Q4H PRN Reji Betancur MD       • atorvastatin (LIPITOR) tablet 10 mg  10 mg Oral Daily Reji Betancur MD   10 mg at 03/23/23 0839   • finasteride (PROSCAR) tablet 5 mg  5 mg Oral Daily Reji Betancur MD   5 mg at 03/23/23 0839   • fluticasone-vilanterol (BREO ELLIPTA) 100-25 MCG/ACT inhaler 1 puff  1 puff Inhalation Daily Resp Reji Betancur MD   1 puff at 03/23/23 0845   • mirtazapine (REMERON) tablet 15 mg  15 mg Oral QHS Reji Betancur MD   15 mg at 03/21/23 2127   • sodium chloride 0.9 % flush bag 25 mL  25 mL Intravenous PRN Reji Betancur MD       • sodium chloride (PF) 0.9 % injection 2 mL  2 mL Intracatheter 2 times per day Reji PAITÑO  MD [place]   2 mL at 03/23/23 0840   • sodium chloride 0.9% infusion   Intravenous Continuous PRN Reji Betancur MD       • sodium chloride (NORMAL SALINE) 0.9 % bolus 500 mL  500 mL Intravenous PRN Reji Betancur MD       • sodium chloride 0.9 % flush bag 25 mL  25 mL Intravenous PRN Reji Betancur MD       • ondansetron (ZOFRAN) injection 4 mg  4 mg Intravenous BID PRN Reji Betancur MD       • acetaminophen (TYLENOL) tablet 650 mg  650 mg Oral Q4H PRN Reji Betancur MD   650 mg at 03/22/23 0932   • HYDROcodone-acetaminophen (NORCO) 5-325 MG per tablet 1 tablet  1 tablet Oral Q4H PRN Reji Betancur MD       • lidocaine 2% urethral (UROJET) 2 % jelly 10 mL  10 mL Transurethral PRN Reji Betancur MD         Continuous Infusions:  Current Facility-Administered Medications   Medication Dose Route Frequency Provider Last Rate Last Admin   • sodium chloride 0.9% infusion   Intravenous Continuous PRN Reji Betancur MD         Intake/Output:  Date 03/22/23 0700 - 03/23/23 0659 03/23/23 0700 - 03/24/23 0659   Shift 0018-4258 8629-6374 3149-7326 24 Hour Total 7024-4662 6274-2062 7857-5869 24 Hour Total   INTAKE   P.O. 480 240  720 180   180   Other 10   10       Shift Total 490 240  730 180   180   OUTPUT   Urine 175   175       Shift Total 175   175       Weight (kg) 61.1 61.1 65.6 65.6 65.6 65.6 65.6 65.6     Imaging reviewed.     Assessment/Plan:   GIB.  -- S/P embolization of distal left sigmoid branches 3/19/23, by Dr. Yee.   -- S/P empiric segmental embolization of proximal sigmoid sement of FAYE 3/22/23, Dr. Fenton  -- Remove right groin (access site) dressing tomorrow.    -- H/H 8.5/26.1 today, stable, vss  -- GI following.      Will follow peripherally. Please call with questions/concerns.     MARÍA ELENA [place]  Interventional Radiology  Available via Epic Secure Chat (M-F [phone])    Please page the on-call IR provider for urgent needs outside of above hours.

## 2023-08-21 NOTE — PT/OT/SLP PROGRESS
Physical Therapy Treatment    Patient Name:  Cali Mabry   MRN:  [id number]    Recommendations:     Discharge Recommendations: psychiatric facility  Discharge Equipment Recommendations: none    Assessment:     Cali Mabry is a 57 y.o. male admitted with a medical diagnosis of Stroke.  He presents with the following impairments/functional limitations: weakness, impaired endurance, gait instability, impaired balance, decreased lower extremity function, decreased ROM, pain, decreased safety awareness, impaired cognition.    Rehab Prognosis: Fair; patient would benefit from acute skilled PT services to address these deficits and reach maximum level of function.    Recent Surgery: * No surgery found *      Plan:     During this hospitalization, patient to be seen 3 x/week to address the identified rehab impairments via gait training, therapeutic activities, therapeutic exercises and progress toward the following goals:    Plan of Care Expires:  8/28/23    Subjective     Chief Complaint: None; pt found asleep in bed, awakened by OT.  Patient/Family Comments/goals: Pt agreeable to treat and OOB to chair for lunch.  Pain/Comfort:  Pain Rating 1: 0/10      Objective:     Communicated with nurseRadha prior to session.  Patient found right sidelying with  (no attachments) upon PT entry to room.     General Precautions: Standard, fall  Orthopedic Precautions: N/A  Braces: N/A  Respiratory Status: Room air     Functional Mobility:  Bed Mobility:     Supine to Sit: minimum assistance  Transfers:     Sit to Stand:  contact guard assistance with straight cane  Gait: 50' w/sc CGA; LOB x1 with min A to recover  Balance: Fair static/dynamic standing      AM-PAC 6 CLICK MOBILITY  Turning over in bed (including adjusting bedclothes, sheets and blankets)?: 4  Sitting down on and standing up from a chair with arms (e.g., wheelchair, bedside commode, etc.): 3  Moving from lying on back to sitting on the side of the bed?:  3  Moving to and from a bed to a chair (including a wheelchair)?: 3  Need to walk in hospital room?: 3  Climbing 3-5 steps with a railing?: 3  Basic Mobility Total Score: 19       Treatment & Education:  Transferred OOB with min A, ambulated to sink for ADLs with OT, ambulated in hallway with sc and CGA.  Pt sat in B/S chair and set up with lunch tray.    Patient left up in chair with  OT and sitter present..    GOALS:   Multidisciplinary Problems       Physical Therapy Goals          Problem: Physical Therapy    Goal Priority Disciplines Outcome Goal Variances Interventions   Physical Therapy Goal     PT, PT/OT Ongoing, Progressing     Description: Goals to be met by: 23     Patient will increase functional independence with mobility by performin. Pt to be (I) with bed mobility.  2. Pt to transfer with (S).  3. Pt to ambulate 150' /c or /s AD and (S).                         Time Tracking:     PT Received On: 23  PT Start Time: 1127     PT Stop Time: 1142  PT Total Time (min): 15 min     Billable Minutes: Gait Training 15    Treatment Type: Treatment  PT/PTA: PT     Number of PTA visits since last PT visit: 2023

## 2023-08-21 NOTE — ED PROVIDER NOTES
----- Message from Barrington Evans Encounter Date: 12/12/2022       History     Chief Complaint   Patient presents with    Medication Refill     Pt to ED via personal transportation with complaints of needing a med refill. Pt states he needs his insulin prescription refilled. Unable to get appt with PCP.      57-year-old female with history of diabetes, hypertension, chronic pancreatitis, IV drug use, and psychiatric disease presents to the emergency department requesting refill of his diabetic medications as well as gabapentin.  States he is close to running out of his long-acting insulin.  States he is not have a primary care doctor or endocrinologist but would like 1 as well.  He is otherwise asymptomatic and specifically denies chest pain, shortness of breath, abdominal pain, nausea, vomiting, fever, and urinary symptoms.  Patient is adamant that healing wants medication refill and does not want investigation of his diabetes performed today in the emergency department.    The history is provided by the patient.   Review of patient's allergies indicates:  No Known Allergies  Past Medical History:   Diagnosis Date    Acute on chronic pancreatitis 04/03/2016    Addiction to drug     CHF (congestive heart failure)     DKA (diabetic ketoacidoses) 02/2020    DM (diabetes mellitus), type 1     HCV (hepatitis C virus)     high VL     HTN (hypertension)     Hx of psychiatric care     Hypomagnesemia 05/25/2020    IVDU (intravenous drug user)     Heroin    Pancreatitis     Psychiatric problem     Substance abuse      Past Surgical History:   Procedure Laterality Date    CARDIAC SURGERY  1999    stent placed. ([facility])    ESOPHAGOGASTRODUODENOSCOPY N/A 3/5/2020    Gastritis.  No H pylori.  Completed PPI for 1 month.  Procedure: EGD (ESOPHAGOGASTRODUODENOSCOPY);  Surgeon: Brinda Rene MD;  Location: [facility];  Service: Endoscopy;  Laterality: N/A;  [id number] A; [phone]    LEFT HEART CATHETERIZATION Left 9/28/2020    Procedure: Left heart cath;   Surgeon: Fito Rangel MD;  Location: [facility] CATH LAB;  Service: Cardiology;  Laterality: Left;    SHOULDER SURGERY  2013    right shoulder, spur removal.     Family History   Problem Relation Age of Onset    Asthma Mother      Social History     Tobacco Use    Smoking status: Every Day     Packs/day: 1.00     Years: 20.00     Pack years: 20.00     Types: Cigarettes     Start date: 4/3/1981     Last attempt to quit: 2020     Years since quittin.5    Smokeless tobacco: Never    Tobacco comments:     states approx 1 per day.   Substance Use Topics    Alcohol use: Not Currently     Alcohol/week: 0.0 standard drinks    Drug use: Yes     Types: Heroin, IV, Marijuana, Amphetamines     Review of Systems   Constitutional:  Negative for fever.   Respiratory:  Negative for shortness of breath.    Cardiovascular:  Negative for chest pain.   Gastrointestinal:  Negative for abdominal pain, nausea and vomiting.   Musculoskeletal:  Negative for back pain, joint swelling and neck pain.   Skin:  Negative for color change and wound.   Neurological:  Negative for numbness.   All other systems reviewed and are negative.    Physical Exam     Initial Vitals [22 1043]   BP Pulse Resp Temp SpO2   (!) 152/90 110 16 98.5 °F (36.9 °C) 99 %      MAP       --         Physical Exam    Nursing note and vitals reviewed.  Constitutional: He appears well-developed and well-nourished. He is not diaphoretic. No distress.   HENT:   Head: Atraumatic.   Right Ear: External ear normal.   Left Ear: External ear normal.   Mouth/Throat: Oropharynx is clear and moist. No oropharyngeal exudate.   Eyes: Conjunctivae are normal.   Neck: No tracheal deviation present.   Normal range of motion.  Cardiovascular:  Regular rhythm.   Tachycardia present.         Pulmonary/Chest: Effort normal. No accessory muscle usage or stridor. No tachypnea. No respiratory distress.   Musculoskeletal:      Cervical back: Normal range of motion.     Neurological: He  displays no tremor. He displays no seizure activity. Coordination and gait normal.   Skin: Skin is warm and intact. No cyanosis.       ED Course   Procedures  Labs Reviewed   POCT GLUCOSE - Abnormal; Notable for the following components:       Result Value    POCT Glucose 360 (*)     All other components within normal limits   CBC W/ AUTO DIFFERENTIAL   COMPREHENSIVE METABOLIC PANEL   BETA - HYDROXYBUTYRATE, SERUM   URINALYSIS, REFLEX TO URINE CULTURE   POCT GLUCOSE MONITORING CONTINUOUS          Imaging Results    None          Medications   sodium chloride 0.9% bolus 2,000 mL (has no administration in time range)     Medical Decision Making:   History:   Old Medical Records: I decided to obtain old medical records.  ED Management:  Hyperglycemic with tachycardia.  Can not exclude DKA at this time.  Recommending further investigation with screening labs while patrolling glucose.  Patient refuses and signs out against medical advice.  I will offer refills of his medications as requested and refer him to both primary care and Endocrinology.  Patient understands he may return anytime to resume workup of possible DKA or other complication related to diabetes.                        Clinical Impression:   Final diagnoses:  [R73.9] Hyperglycemia  [Z76.0] Medication refill (Primary)  [Z53.29] Left against medical advice        ED Disposition Condition    AMA Stable                Pieter Rowley PA-C  12/12/22 1130       Pieter Rowley PA-C  12/12/22 1130

## 2023-08-21 NOTE — PROGRESS NOTES
[facility] Medicine  Progress Note    Patient Name: Cali Mabry  MRN: [id number]  Patient Class: IP- Inpatient   Admission Date: 8/15/2023  Length of Stay: 6 days  Attending Physician: Cullen Jackman MD  Primary Care Provider: Lawanda Flores NP        Subjective:     Principal Problem:Stroke        HPI:  This is a 57-year-old male with a past medical history of type 1 diabetes, COPD (not on O2), CAD, hypertension, hyperlipidemia, substance use, mood disorder who presents with blurry vision.    Patient had multiple ED presentations for headaches, blurry vision, eye pain this month.  On 08/02, he presented to [facility] and had a CT head that showed a new left cerebellar infarct, however left against medical advice.  He returned to  ED on 8/7 and to [facility] ED for worsening vision loss, however left AMA.  He had a brief hospitalization to  on 8/9 for DKA after presenting with similar symptoms as well.  Patient is known to be verbally abusive to staff.    In the ED, the patient was hypertensive.  Labs were remarkable for hypercholesterolemia (297).  UDS was positive for amphetamines and benzodiazepines.  CTA head showed Cut off involving a part of the P2 segment of the right PCA,  subacute to chronic infarct involving the right medial temporal lobe in the right occipital lobe & remote right pontine and left lateral cerebellar hemisphere infarcts.  Vascular Neurology was consulted and stated patient can stay at [facility].  Psychiatry was consulted and recommended pec giving grave disability.  Patient was given aspirin 325 mg and was admitted for further management.      Overview/Hospital Course:  No notes on file    Interval History: No acute events overnight.  Today resting calmly when I entered room, but almost immediately started yelling profanities.  Did not threaten me physically today.  While obvious he is quite neurologically impaired and has significant safety impairments due to poor balance  and compromised vision from his stroke, he is adamant that he is fine, has no vision difficulties or balance difficulties.      Objective:     Vital Signs (Most Recent):  Temp: 98 °F (36.7 °C) (08/21/23 0837)  Pulse: 95 (08/21/23 0837)  Resp: 18 (08/21/23 0837)  BP: 125/61 (08/21/23 0837)  SpO2: 98 % (08/21/23 0837) Vital Signs (24h Range):  Temp:  [97.8 °F (36.6 °C)-98.1 °F (36.7 °C)] 98 °F (36.7 °C)  Pulse:  [85-98] 95  Resp:  [16-20] 18  SpO2:  [96 %-100 %] 98 %  BP: (125-184)/(61-94) 125/61     Weight: 68 kg (149 lb 14.6 oz)  Body mass index is 22.79 kg/m².    Intake/Output Summary (Last 24 hours) at 8/21/2023 1154  Last data filed at 8/21/2023 1010  Gross per 24 hour   Intake 360 ml   Output --   Net 360 ml           Physical Exam  Vitals and nursing note reviewed.   Constitutional:       General: He is not in acute distress.     Appearance: Normal appearance. He is not ill-appearing.   HENT:      Head: Normocephalic and atraumatic.      Nose: Nose normal.      Mouth/Throat:      Mouth: Mucous membranes are moist.   Eyes:      Extraocular Movements: Extraocular movements intact.      Comments: Vision is quite compromised - but does not participate in field testing or acuity   Cardiovascular:      Rate and Rhythm: Normal rate and regular rhythm.      Pulses: Normal pulses.      Heart sounds: No murmur heard.  Pulmonary:      Effort: Pulmonary effort is normal. No respiratory distress.   Abdominal:      General: Abdomen is flat.      Palpations: Abdomen is soft.      Tenderness: There is no abdominal tenderness.   Skin:     General: Skin is warm.      Capillary Refill: Capillary refill takes less than 2 seconds.   Neurological:      Mental Status: He is alert.      Comments: Alert, does not answer orienting questions or allow neurological evaluation.  Calm upon my entrance to room but almost immediately started yelling profanities and was severely agitated.  Did not threaten me physically today.   Psychiatric:       Comments: Extremely labile mood with aggression and very loudly yelling profanities.  Agitated and not-cooperative with exam or interview.  Appears to either be in denial or have almost no insight into the severity of his medical illness.  Family states that within the last week he has said several times he wishes to kill himself.           Significant Labs: All pertinent labs within the past 24 hours have been reviewed.    Significant Imaging: I have reviewed all pertinent imaging results/findings within the past 24 hours.        Assessment/Plan:      * Stroke  -Admitted to inpatient status  -Presented for evaluation of backwards fall a week ago and vision changes since then  -Noted CT head (8/2) showing a new left cerebellar infarct.  Patient left AMA at that time  -In ER this admit, CTA head showed cut off involving a part of the P2 segment of the right PCA, subacute to chronic infarct involving the right medial temporal lobe in the right occipital lobe & remote right pontine and left lateral cerebellar hemisphere infarcts.    -Suspect related to substance abuse - noted amphetamines in UDS  -A1c 9.5   , HDL 45,   LDL measurement invalid  -Echo showedEF 55-60%, diastolic dysfunction, negative bubble study.  -CUS showed 50-69% stenosis of the right ICA and 1-49% stenosis of the left ICA  -Consulted tele-neurology, PT, OT and SLP - input appreciated  -PT/OT recommend HH with PT, OT and SLP at discharge.  -Clinically improving - states vision symptoms have resolved, but nurses (and family) note that his vision is very poor.  He does not participate with acuity testing.  Technically he is medically stable for discharge from hospital, however he needs inpatient psychiatric hospitalization and we not at all confident he can safely navigate in that environment yet  -Continue DAPT (asa 81 + plavix 75) x 21 days and then stop aspirin.  Last day forDAPT will be 9/5  -Continue statin  -Continue to pursue  normotension  -Discharge pending ability to safely navigate around psych unit or placement in a medical psych unit, or alternatively clearance from psychiatry to lift PEC/CEC and discharge home with HH.  At this time he is medically stable only to go to a medical psych unit - but not standard inpatient psych because patient is not able to self feed, self toilet and ambulate safely in psych unit.    -Meeting with case management and legal this afternoon to discuss pursuit of guardianship / conservatorship.  Are these the same process as judicial commitment?    Substance induced mood disorder  -Noted agitation, combativeness and grandiosity of thoughts on admit  -Seen by tele-psych in ER and given provisional diagnosis of Ila and r/o mood disorder  -Placed under PEC and recommended inpatient psych placement once medically stable  -Patient remains with severe aggression, agitation and combativeness.  Did not threaten me with physical harm today.    -Noting ventriculomegaly on CT - could NPH be a contributor to his difficulty ambulating, emotional lability and urinary incontinence?  Also, is neudexta indicated for possible PBA for him?  Reconsulted tele-neuro 8/20 and discussed with Dr. Kendrick.  NPH testing should not be done inpatient, especially in light of the sedating medications he is requiring, active psyhiatric issues and acute stroke - all of which can cloud the needed testing.  No indication noted at this time for neudexta.  Will need outpatient testing for NPH with neurology.    -Per psych recs 8/19 will rule out cryoglobulinemia, endocrinopathy and iron deficiency (cryoglobulin, BEAR, dsDNA, complement, SSa, SSb, ANCA, RF, ESR, CRP, Sm/RNP), HCV viral load, B1. Once more calm and participatory can order MRI brain.   -He is iron deficient - starting FeSO4 8/21   -ESR and CRP are elevated (63 and 10.7 respectively)   -He is B12 deficient - start B12 replacement 8/21   -Complement levels and TSH normal.  HIV  non-reactive    -Per psych recs 8/19:   A. 8/20 Started thorazine 25mg po tid if will take oral.  Otherwise can use thorazine 25mg IM q2h until patient is calm (STILL NOT CALM).     B. 8/20 Start gabapentin 400mg po tid for possible benzo withdrawal agitation component.     C. 8/20 Added clonidine 0.1mg bid prn breakthrough agitation (1st line)   D. 8/20 Added lamotrigine 25mg qhs x 2 weeks then 50mg qhs.   E: 8/20 stopped seroquel 100mg qhs.      F. 8/20 Stopped PRN ativan (goal is to cover possible benzo withdrawal with gabapentin)   G. Do not start: Trazodone (risk of falls and orthostatic hypotension), Geodon (can worsen mainia and agitation), Depakote (can worsen ventriculomegaly), Trileptal or Tegratol (risk of coronary adverse effects    -Ultimately he needs inpatient psychiatric care, but is not medically cleared at this time.  Must be able to self toilet, feed and bath without need for assistance.  Further, I agree with psychiatry that he lacks capacity for medical decision making as well as recommendation for pursuing guardianship/conservatoryship.      -Discussed with case management and have meeting this afternoon to discuss process of establishing conservatorship / guardianship.  Are these the same process as judicial commitment?    -Needs daily consult to tele-psych if pursuits of conservatorship is the same as judicial commitment.  Consult tele-psych again today.    Impulse control disorder in adult  -Treatment as above.    Noncompliance with medication regimen  -Noted history as well as illicit substance use and prior AMA  -Counseled     Polysubstance use disorder  -Urine drug screen positive for amphetamines and benzodiazepines  -Patient denies use  -Likely contributor to medical illness as well as mood alterations and combativeness.  -Counseled on cessation    Type 1 diabetes  -Last A1c 10.4 1/27/23  -A1c now 9.5  -Blood sugars are better so far today  -Refuses labs and accu-checks  -Increase  detemir to 30 units qd and lispro 9 units tidwm  -Continue moderate dose SSI ac/hs    CAD (coronary artery disease)  -Denies chest pain  -Continue aspirin and statin  -Monitor on telemetry    COPD (chronic obstructive pulmonary disease)  -No wheezing at this time  -P.r.nKevin Cam.    Hyperlipidemia  -Continue statin.    Primary hypertension  -BP normal to moderately elevated  -Given subacute nature of his stroke - pursuing normotension  -No BP meds on home list  -Given CAD history added metoprolol 8/16 - continue for now      VTE Risk Mitigation (From admission, onward)           Ordered     enoxaparin injection 40 mg  Every 24 hours         08/16/23 1155     IP VTE LOW RISK PATIENT  Once         08/16/23 0025     Place sequential compression device  Until discontinued         08/16/23 0025                    Discharge Planning   ROLA:      Code Status: Full Code   Is the patient medically ready for discharge?:     Reason for patient still in hospital (select all that apply): Treatment  Discharge Plan A: Psychiatric hospital   Discharge Delays: None known at this time              Cullen Jackman MD  Department of Hospital Medicine   [facility] [place] - Telemetry

## 2023-08-21 NOTE — SUBJECTIVE & OBJECTIVE
Interval History: No acute events overnight.  Today resting calmly when I entered room, but almost immediately started yelling profanities.  Did not threaten me physically today.  While obvious he is quite neurologically impaired and has significant safety impairments due to poor balance and compromised vision from his stroke, he is adamant that he is fine, has no vision difficulties or balance difficulties.      Objective:     Vital Signs (Most Recent):  Temp: 98 °F (36.7 °C) (08/21/23 0837)  Pulse: 95 (08/21/23 0837)  Resp: 18 (08/21/23 0837)  BP: 125/61 (08/21/23 0837)  SpO2: 98 % (08/21/23 0837) Vital Signs (24h Range):  Temp:  [97.8 °F (36.6 °C)-98.1 °F (36.7 °C)] 98 °F (36.7 °C)  Pulse:  [85-98] 95  Resp:  [16-20] 18  SpO2:  [96 %-100 %] 98 %  BP: (125-184)/(61-94) 125/61     Weight: 68 kg (149 lb 14.6 oz)  Body mass index is 22.79 kg/m².    Intake/Output Summary (Last 24 hours) at 8/21/2023 1154  Last data filed at 8/21/2023 1010  Gross per 24 hour   Intake 360 ml   Output --   Net 360 ml           Physical Exam  Vitals and nursing note reviewed.   Constitutional:       General: He is not in acute distress.     Appearance: Normal appearance. He is not ill-appearing.   HENT:      Head: Normocephalic and atraumatic.      Nose: Nose normal.      Mouth/Throat:      Mouth: Mucous membranes are moist.   Eyes:      Extraocular Movements: Extraocular movements intact.      Comments: Vision is quite compromised - but does not participate in field testing or acuity   Cardiovascular:      Rate and Rhythm: Normal rate and regular rhythm.      Pulses: Normal pulses.      Heart sounds: No murmur heard.  Pulmonary:      Effort: Pulmonary effort is normal. No respiratory distress.   Abdominal:      General: Abdomen is flat.      Palpations: Abdomen is soft.      Tenderness: There is no abdominal tenderness.   Skin:     General: Skin is warm.      Capillary Refill: Capillary refill takes less than 2 seconds.   Neurological:       Mental Status: He is alert.      Comments: Alert, does not answer orienting questions or allow neurological evaluation.  Calm upon my entrance to room but almost immediately started yelling profanities and was severely agitated.  Did not threaten me physically today.   Psychiatric:      Comments: Extremely labile mood with aggression and very loudly yelling profanities.  Agitated and not-cooperative with exam or interview.  Appears to either be in denial or have almost no insight into the severity of his medical illness.  Family states that within the last week he has said several times he wishes to kill himself.           Significant Labs: All pertinent labs within the past 24 hours have been reviewed.    Significant Imaging: I have reviewed all pertinent imaging results/findings within the past 24 hours.

## 2023-08-21 NOTE — ASSESSMENT & PLAN NOTE
-Noted agitation, combativeness and grandiosity of thoughts on admit  -Seen by tele-psych in ER and given provisional diagnosis of Ila and r/o mood disorder  -Placed under PEC and recommended inpatient psych placement once medically stable  -Patient remains with severe aggression, agitation and combativeness.  Did not threaten me with physical harm today.    -Noting ventriculomegaly on CT - could NPH be a contributor to his difficulty ambulating, emotional lability and urinary incontinence?  Also, is neudexta indicated for possible PBA for him?  Reconsulted tele-neuro 8/20 and discussed with Dr. Kendrick.  NPH testing should not be done inpatient, especially in light of the sedating medications he is requiring, active psyhiatric issues and acute stroke - all of which can cloud the needed testing.  No indication noted at this time for neudexta.  Will need outpatient testing for NPH with neurology.    -Per psych recs 8/19 will rule out cryoglobulinemia, endocrinopathy and iron deficiency (cryoglobulin, BEAR, dsDNA, complement, SSa, SSb, ANCA, RF, ESR, CRP, Sm/RNP), HCV viral load, B1. Once more calm and participatory can order MRI brain.   -He is iron deficient - starting FeSO4 8/21   -ESR and CRP are elevated (63 and 10.7 respectively)   -He is B12 deficient - start B12 replacement 8/21   -Complement levels and TSH normal.  HIV non-reactive    -Per psych recs 8/19:   A. 8/20 Started thorazine 25mg po tid if will take oral.  Otherwise can use thorazine 25mg IM q2h until patient is calm (STILL NOT CALM).     B. 8/20 Start gabapentin 400mg po tid for possible benzo withdrawal agitation component.     C. 8/20 Added clonidine 0.1mg bid prn breakthrough agitation (1st line)   D. 8/20 Added lamotrigine 25mg qhs x 2 weeks then 50mg qhs.   E: 8/20 stopped seroquel 100mg qhs.      F. 8/20 Stopped PRN ativan (goal is to cover possible benzo withdrawal with gabapentin)   G. Do not start: Trazodone (risk of falls and orthostatic  hypotension), Geodon (can worsen mainia and agitation), Depakote (can worsen ventriculomegaly), Trileptal or Tegratol (risk of coronary adverse effects    -Ultimately he needs inpatient psychiatric care, but is not medically cleared at this time.  Must be able to self toilet, feed and bath without need for assistance.  Further, I agree with psychiatry that he lacks capacity for medical decision making as well as recommendation for pursuing guardianship/conservatoryship.      -Discussed with case management and have meeting this afternoon to discuss process of establishing conservatorship / guardianship.  Are these the same process as judicial commitment?    -Needs daily consult to tele-psych if pursuits of conservatorship is the same as judicial commitment.  Consult tele-psych again today.

## 2023-08-21 NOTE — ASSESSMENT & PLAN NOTE
-Last A1c 10.4 1/27/23  -A1c now 9.5  -Blood sugars are better so far today  -Refuses labs and accu-checks  -Increase detemir to 30 units qd and lispro 9 units tidwm  -Continue moderate dose SSI ac/hs

## 2023-08-21 NOTE — PLAN OF CARE
CM notified pt's cousin, Marbella that pt is medically stable for discharge to psychiatric facility. Marbella will inform pt's mother, Marilou.

## 2023-08-21 NOTE — PLAN OF CARE
"CM contacted pt's mother, Marilou to discuss guardianship. Pt's mother requested CM contact tito Tyson to inform her. CM explained to Marbella that psychiatry is recommending pt have a appointed guardian/conservator. Marbella stated she will call pt's mother and explain.    CM received a call from pt's mother and she stated " Call Marbella and she will explain to me." CM will assist as needed.     08/21/23 3917   Discharge Reassessment   Assessment Type Discharge Planning Reassessment   Did the patient's condition or plan change since previous assessment? No   Discharge Plan discussed with: Parent(s)  (Marbella- cousin [phone])   Name(s) and Number(s) Marilou- mother [phone]   Discharge Plan A Psychiatric hospital   Discharge Plan B Home with family   DME Needed Upon Discharge  other (see comments)  (tbd)   Transition of Care Barriers Mental illness;Substance Abuse   Why the patient remains in the hospital Requires continued medical care   Post-Acute Status   Coverage [facility] DUAL COMPLETE O SNP   Discharge Delays None known at this time       "

## 2023-08-21 NOTE — PLAN OF CARE
Problem: Physical Therapy  Goal: Physical Therapy Goal  Description: Goals to be met by: 23     Patient will increase functional independence with mobility by performin. Pt to be (I) with bed mobility.  2. Pt to transfer with (S).  3. Pt to ambulate 150' /c or /s AD and (S).    Outcome: Ongoing, Progressing   Pt ambulated with sc today, declined use of RW.

## 2023-08-21 NOTE — NURSING
PIV scheduled to be changed. Educated patient on the importance of changing PIV to prevent infection. Pt voiced understanding but refused to be re-stuck. He said that he may do it in the morning.

## 2023-08-21 NOTE — PT/OT/SLP PROGRESS
"Occupational Therapy   Treatment    Name: Cali Mabry  MRN: [id number]  Admitting Diagnosis:  Stroke       Recommendations:     Discharge Recommendations:  (pt will require 24 hour supervision at next level of care; per chart, pt to d/c to IP psych facility)  Discharge Equipment Recommendations:  none  Barriers to discharge:   (pt  will continue to require supervision d/t poor safety awareness)    Assessment:     Cali Mabry is a 57 y.o. male with a medical diagnosis of Stroke. Performance deficits affecting function are weakness, gait instability, impaired balance, impaired endurance, decreased lower extremity function, decreased upper extremity function, decreased safety awareness, impaired cognition, impaired self care skills, impaired functional mobility, decreased coordination.     Pt with reported chronic vision issues, requiring assist d/t impaired depth perception with a couple ADLs as described below. Vision issues are more observed on pt's L side. Pt became agitated with attempt to edu on scanning techniques, but calmed down quickly when OT ceased and re-directed. Pt refuses to try to use the RW and requires SBA to CGA with use of str c for functional mobility.     Rehab Prognosis:  Fair; patient would benefit from acute skilled OT services to address these deficits and reach maximum level of function.       Plan:     Patient to be seen daily (per MD orders) to address the above listed problems via self-care/home management, therapeutic activities, therapeutic exercises  Plan of Care Expires: 08/30/23  Plan of Care Reviewed with: patient    Subjective     Chief Complaint: initially didn't want to do anything, but perked up when OT mentioned bringing a cane   Patient/Family Comments/goals: refuses to use a RW "I don't need it"     Pain/Comfort:  Pain Rating 1: 0/10    Objective:     Communicated with: nurseRadha, prior to session.  Patient found right sidelying with peripheral IV (safety " sitter) upon OT entry to room.    General Precautions: Standard, fall (PEC)    Orthopedic Precautions:N/A  Braces: N/A  Respiratory Status: Room air     Occupational Performance:     Bed Mobility:    Patient completed Scooting anteriorly with stand by assistance  Patient completed Supine to Sit with minimum assistance     Functional Mobility/Transfers:  Patient completed Sit <> Stand Transfer with CGA-SBA  with  straight cane   Functional Mobility: upon getting OOB, pt had increased forward flexed posture using str c and reaching out for the chair HR, requiring SBA-CGA for safety. After seated rest break, pt completed in-room functional mobility to complete ADLs at the sink with str c and SBA. Pt then completed ~50 ft using str c with CGA assistance with 1 LOB with CGA.     Activities of Daily Living:  Feeding:  pt had cups in the room with straws- per SLP, no straws. OT removed all straws and escalated this to the nurse. Pt was able to grasp cup of water from OT without issue and take sips using RUE. Pt required assistance d/t impaired vision on opening styrofoam container. Pt was able to grasp the spoon, tear the seal off the butter, and stir it without error. Pt reported inability to find container to the L of his food tray with seasonings. Pt's behavior began to escalate as OT attempt to edu on strategies for scanning, so OT re-directed back to feeding and OT assisted with salt and pepper packets which calmed pt down. Pt took bites of beans via spoon without error.     Grooming: stand by assistance standing at the sink to doff/don glasses in order to comb hair. Pt required 1 cue to visually scan to the L side of the counter to find the comb and then pt able to alternate BUE to comb hair without difficulty.   Lower Body Dressing: seated EOB with SBA-SUP, pt prompted to adjust his socks. OT observed pt to have issues with depth perception to find his sock. With hand-over-hand and cueing to feel the top of the  sock, pt then able to readjust sock with min A.         [facility] 6 Click ADL: 21    Treatment & Education:  Pt re-educated on OT role/POC.   Importance of OOB activity with staff assistance.  Safety during functional t/f and mobility   Multiple self-care tasks/functional mobility completed- assistance level noted above   All questions/concerns answered within OT scope of practice       Patient left  up in the chair with tray table in front of pt with lunch set-up  with all lines intact, call button in reach, nurse, Radha, notified, safety sitter present, and all needs met/within reach. Door left open.     Addendum:   2nd session completed 2* lunch interrupting end of session. Pt found HOB elevated and easily awoken. Pt was cursing a lot in frustration of not having options of drinks and wanting to get out of here; however, when OT stated that I can leave, pt reported that therapy has helped him a lot and he will do exercises. OT focused on giving pt two options throughout session. Pt with posterior lean with dynamic sit exercises at EOB, so pt completed sit>stand with CGA and HHA and few steps bed>chair with CGA. Seated in the chair, pt completed BUE/BLE AROM x10 each: shoulder flex/ext, shoulder horizontal ab/dduction, seated marches, seated LAQ. Feeding in the chair with set-up (drink). OT removed straws again from the room.     Patient left reclined in the chair with B/L heels offloaded by pillow, tray table in front of pt with all lines intact, call button in reach, safety sitter present. Door left open, lights on, tv on.     GOALS:   Multidisciplinary Problems       Occupational Therapy Goals          Problem: Occupational Therapy    Goal Priority Disciplines Outcome Interventions   Occupational Therapy Goal     OT, PT/OT Ongoing, Progressing    Description: Goals to be met by: 08/30/23     Patient will increase functional independence with ADLs by performing:    LE Dressing with Supervision.  Grooming while  standing at sink with Supervision.  Toileting from toilet with Supervision for hygiene and clothing management.   Supine to sit with Supervision (goal met 08/19/23)  Step transfer with Supervision  Toilet transfer to toilet with Supervision.  Upper extremity exercise program x15 reps per handout, with independence.                         Time Tracking:     OT Date of Treatment: 08/21/23  OT Start Time: 1128  OT Stop Time: 1145  OT Total Time (min): 17 min    Billable Minutes:Self Care/Home Management 17 min (co-treat with PT for safety)     Second session:   0823-1166 : 15 min therapeutic exercises      OT/LEONCIO: OT     Number of LEONCIO visits since last OT visit: 0    8/21/2023

## 2023-08-21 NOTE — NURSING
[facility] [place]  Nurses Note -- 4 Eyes      8/20/2023       Skin assessed on: Q Shift      [x] No Pressure Injuries Present    []Prevention Measures Documented    [] Yes LDA  for Pressure Injury Previously documented     [] Yes New Pressure Injury Discovered   [] LDA for New Pressure Injury Added      Attending RN:  Amarilis Gomez RN     Second RN:  Radha Fishman LPN

## 2023-08-21 NOTE — ASSESSMENT & PLAN NOTE
-Admitted to inpatient status  -Presented for evaluation of backwards fall a week ago and vision changes since then  -Noted CT head (8/2) showing a new left cerebellar infarct.  Patient left AMA at that time  -In ER this admit, CTA head showed cut off involving a part of the P2 segment of the right PCA, subacute to chronic infarct involving the right medial temporal lobe in the right occipital lobe & remote right pontine and left lateral cerebellar hemisphere infarcts.    -Suspect related to substance abuse - noted amphetamines in UDS  -A1c 9.5   , HDL 45,   LDL measurement invalid  -Echo showedEF 55-60%, diastolic dysfunction, negative bubble study.  -CUS showed 50-69% stenosis of the right ICA and 1-49% stenosis of the left ICA  -Consulted tele-neurology, PT, OT and SLP - input appreciated  -PT/OT recommend HH with PT, OT and SLP at discharge.  -Clinically improving - states vision symptoms have resolved, but nurses (and family) note that his vision is very poor.  He does not participate with acuity testing.  Technically he is medically stable for discharge from hospital, however he needs inpatient psychiatric hospitalization and we not at all confident he can safely navigate in that environment yet  -Continue DAPT (asa 81 + plavix 75) x 21 days and then stop aspirin.  Last day forDAPT will be 9/5  -Continue statin  -Continue to pursue normotension  -Discharge pending ability to safely navigate around psych unit or placement in a medical psych unit, or alternatively clearance from psychiatry to lift PEC/CEC and discharge home with HH.  At this time he is not medically cleared for an inpatient psych - must be able to self feed, self toilet and ambulate safely in psych unit.    -Meeting with case management and legal this afternoon to discuss pursuit of guardianship / conservatorship.  Are these the same process as judicial commitment?

## 2023-08-21 NOTE — CONSULTS
"[facility]  Psychiatry  Telepsychiatry Progress Note    Please see previous notes:    Patient agreeable to consultation via telepsychiatry.    Tele-Consultation from Psychiatry started: 8/21/2023 at 11:41 AM  The chief complaint leading to psychiatric consultation is: CEC  This consultation was requested by Dr Jackman, the attending physician.  The location of the consulting psychiatrist is [place].  [facility]   The patient was admitted 8/15/2023  6:31 PM    Also present with the patient at the time of the consultation: sitter    Patient Identification:   Cali Mabry is a 57 y.o. male.    Patient information was obtained from patient, past medical records, ER records, and primary team.    Inpatient consult to Telemedicine - Psych  Consult performed by: Jimena Laurent MD  Consult ordered by: Cullen Jackman MD        Teleconsult Time Documentation  Subjective:     History of Present Illness:  Per medicine progress note 8/20/23: "Principal Problem:Stroke           HPI:  This is a 57-year-old male with a past medical history of type 1 diabetes, COPD (not on O2), CAD, hypertension, hyperlipidemia, substance use, mood disorder who presents with blurry vision.     Patient had multiple ED presentations for headaches, blurry vision, eye pain this month.  On 08/02, he presented to [facility] and had a CT head that showed a new left cerebellar infarct, however left against medical advice.  He returned to  ED on 8/7 and to [facility] ED for worsening vision loss, however left AMA.  He had a brief hospitalization to  on 8/9 for DKA after presenting with similar symptoms as well.  Patient is known to be verbally abusive to staff.     In the ED, the patient was hypertensive.  Labs were remarkable for hypercholesterolemia (297).  UDS was positive for amphetamines and benzodiazepines.  CTA head showed Cut off involving a part of the P2 segment of the right PCA,  subacute to chronic infarct " "involving the right medial temporal lobe in the right occipital lobe & remote right pontine and left lateral cerebellar hemisphere infarcts.  Vascular Neurology was consulted and stated patient can stay at [facility].  Psychiatry was consulted and recommended pec giving grave disability.  Patient was given aspirin 325 mg and was admitted for further management.        Overview/Hospital Course:  No notes on file     Interval History: No acute events overnight.  Remains severely agitated, yelling profanities and threatened to hit me during today's visit.  Required thorazine and security presence this morning so far.  Vision is obviously significantly compromised - could not see his glasses laying on his abdomen. "     Pt seen and chart reviewed, including prior telepsychiatry notes from current admission. Compliant with AM meds, no PRNs for agitation x24 hours. On exam, pt initially cooperative, becomes irritable when asked questions about his hx or any mental health sxs. States doing "ok" this morning, slept "ok," ate breakfast, appetite "fine." Asked about mood says "I'm good except for one thing this headache." Says is persistent, throbbing, worsened by light, nothing makes better, no change since admission. Says nerve pain is "a lot better." Also notes unprompted "I urinate on myself sometimes" and asks about medication for his prostate. Asked about his previously reported AH says "I don't hear voices they're full of shit. There's nothing wrong with me, I'm irritated and aggravated being here." Stops responding to further questions.     Per chart review with updates where applicable:  Psychiatric History:   Previous Psychiatric Hospitalizations: Yes multiple  Previous Medication Trials: Yes Depakote, Zyprexa, Thorazine, Suboxone, Valium, Vistaril, Seroquel, Cymbalta  Previous Suicide Attempts: MARU   History of Violence: yes  History of Depression: yes  History of Ila: no  History of Auditory/Visual Hallucination " "MARU  History of Delusions: MARU  Outpatient psychiatrist (current & past): No    Substance Abuse History:  Tobacco:Yes  Alcohol: Yes  Illicit Substances:Yes cocaine, heroin, amphetamines  Detox/Rehab: No    Legal History: Past charges/incarcerations: Yes     Family Psychiatric History: [facility]      Social History:  Developmental/Childhood:reports growing up in [facility] until age 11  *Education:[facility]  Employment Status/Finances:Unemployed   Relationship Status/Sexual Orientation: heterosexual  Children: 0  Housing Status: Home w mother   history:  NO  Access to gun: [facility]  Religious:[facility]  Recreational activities:[facility]    Psychiatric Mental Status Exam:  Arousal: alert  Sensorium/Orientation: oriented to person  Behavior/Cooperation: briefly cooperative   Speech: normal tone, normal rate, normal pitch, normal volume, profane  Language: grossly intact  Mood: " good "   Affect: irritable  Thought Process: goal-directed  Thought Content:   Auditory hallucinations: NO  Visual hallucinations: [facility]  Paranoia: [facility]  Delusions:  [facility]  Suicidal ideation: [facility]  Homicidal ideation: [facility]  Attention/Concentration:  intact  Memory: intact to conversation, MARU formally  Fund of Knowledge: [facility]  Abstract reasoning: [facility]  Insight: limited awareness of illness  Judgment: limited      Past Medical History:   Past Medical History:   Diagnosis Date    Acute on chronic pancreatitis 04/03/2016    Addiction to drug     CHF (congestive heart failure)     DKA (diabetic ketoacidoses) 02/2020    DM (diabetes mellitus), type 1     HCV (hepatitis C virus)     high VL     HTN (hypertension)     Hx of psychiatric care     Hypomagnesemia 05/25/2020    IVDU (intravenous drug user)     Heroin    Pancreatitis     Psychiatric problem     Substance abuse       Laboratory Data:   Labs Reviewed   CBC W/ AUTO DIFFERENTIAL - Abnormal; Notable for the following components:       Result Value    Hemoglobin 12.8 (*)     MCH 25.9 (*)     MCHC 31.4 (*)     RDW 16.7 (*)     MPV " 8.8 (*)     Immature Granulocytes 0.6 (*)     All other components within normal limits   COMPREHENSIVE METABOLIC PANEL - Abnormal; Notable for the following components:    Glucose 140 (*)     Total Protein 9.0 (*)     Alkaline Phosphatase 152 (*)     All other components within normal limits   TSH - Abnormal; Notable for the following components:    TSH 0.176 (*)     All other components within normal limits   LIPID PANEL - Abnormal; Notable for the following components:    Cholesterol 297 (*)     Triglycerides 691 (*)     HDL/Cholesterol Ratio 15.2 (*)     Total Cholesterol/HDL Ratio 6.6 (*)     All other components within normal limits   URINALYSIS, REFLEX TO URINE CULTURE - Abnormal; Notable for the following components:    Specific Gravity, UA >1.030 (*)     Protein, UA 2+ (*)     Glucose, UA 3+ (*)     All other components within normal limits    Narrative:     Specimen Source->Urine   DRUG SCREEN PANEL, URINE EMERGENCY - Abnormal; Notable for the following components:    Benzodiazepines Presumptive Positive (*)     Amphetamine Screen, Ur Presumptive Positive (*)     All other components within normal limits    Narrative:     Specimen Source->Urine   ACETAMINOPHEN LEVEL - Abnormal; Notable for the following components:    Acetaminophen (Tylenol), Serum <3.0 (*)     All other components within normal limits   CBC W/ AUTO DIFFERENTIAL - Abnormal; Notable for the following components:    RBC 4.41 (*)     Hemoglobin 11.4 (*)     Hematocrit 35.0 (*)     MCV 79 (*)     MCH 25.9 (*)     RDW 16.6 (*)     Immature Granulocytes 0.8 (*)     All other components within normal limits   COMPREHENSIVE METABOLIC PANEL - Abnormal; Notable for the following components:    Sodium 129 (*)     Potassium 5.2 (*)     Glucose 631 (*)     BUN 24 (*)     Albumin 3.0 (*)     All other components within normal limits    Narrative:     GLU critical result(s) called and verbal readback obtained from Christina Terry RN by [facility] 08/16/2023 07:37    BETA - HYDROXYBUTYRATE, SERUM - Abnormal; Notable for the following components:    Beta-Hydroxybutyrate 2.3 (*)     All other components within normal limits   BASIC METABOLIC PANEL - Abnormal; Notable for the following components:    Sodium 129 (*)     Glucose 632 (*)     BUN 26 (*)     All other components within normal limits    Narrative:       Glucose critical result(s) called and verbal readback obtained from   [address]ivian St by [facility] 08/16/2023 08:52   POCT GLUCOSE - Abnormal; Notable for the following components:    POCT Glucose >500 (*)     All other components within normal limits   POCT GLUCOSE - Abnormal; Notable for the following components:    POCT Glucose >500 (*)     All other components within normal limits   POCT GLUCOSE - Abnormal; Notable for the following components:    POCT Glucose >500 (*)     All other components within normal limits   POCT GLUCOSE - Abnormal; Notable for the following components:    POCT Glucose >500 (*)     All other components within normal limits   POCT GLUCOSE - Abnormal; Notable for the following components:    POCT Glucose 362 (*)     All other components within normal limits   PROTIME-INR   ALCOHOL,MEDICAL (ETHANOL)   ACETAMINOPHEN LEVEL   ALCOHOL,MEDICAL (ETHANOL)   T4, FREE   URINALYSIS MICROSCOPIC    Narrative:     Specimen Source->Urine   MAGNESIUM   HEMOGLOBIN A1C   MAGNESIUM   PHOSPHORUS   POCT GLUCOSE, HAND-HELD DEVICE   POCT GLUCOSE, HAND-HELD DEVICE   POCT GLUCOSE MONITORING CONTINUOUS   POCT GLUCOSE MONITORING CONTINUOUS       Neurological History:  Seizures: MARU  Head trauma: Yes    Allergies:   Review of patient's allergies indicates:  No Known Allergies    Medications in ER:   Medications   aspirin chewable tablet 81 mg (81 mg Oral Given 8/21/23 0842)   atorvastatin tablet 20 mg (20 mg Oral Given 8/21/23 0842)   sodium chloride 0.9% flush 10 mL (has no administration in time range)   albuterol-ipratropium 2.5 mg-0.5 mg/3 mL nebulizer solution 3 mL (has no  administration in time range)   melatonin tablet 6 mg (6 mg Oral Given 8/20/23 2003)   prochlorperazine injection Soln 5 mg (has no administration in time range)   polyethylene glycol packet 17 g (17 g Oral Not Given 8/21/23 0845)   bisacodyL suppository 10 mg (has no administration in time range)   acetaminophen tablet 650 mg (650 mg Oral Given 8/20/23 0149)   simethicone chewable tablet 80 mg (has no administration in time range)   aluminum-magnesium hydroxide-simethicone 200-200-20 mg/5 mL suspension 30 mL (has no administration in time range)   acetaminophen tablet 650 mg (650 mg Oral Given 8/21/23 0844)   naloxone 0.4 mg/mL injection 0.02 mg (has no administration in time range)   potassium bicarbonate disintegrating tablet 50 mEq (has no administration in time range)   potassium bicarbonate disintegrating tablet 35 mEq (has no administration in time range)   potassium bicarbonate disintegrating tablet 60 mEq (has no administration in time range)   magnesium oxide tablet 800 mg (has no administration in time range)   magnesium oxide tablet 800 mg (has no administration in time range)   potassium, sodium phosphates 280-160-250 mg packet 2 packet (has no administration in time range)   potassium, sodium phosphates 280-160-250 mg packet 2 packet (has no administration in time range)   potassium, sodium phosphates 280-160-250 mg packet 2 packet (has no administration in time range)   glucose chewable tablet 16 g (has no administration in time range)   glucose chewable tablet 24 g (has no administration in time range)   dextrose 50% injection 12.5 g (has no administration in time range)   dextrose 50% injection 25 g (has no administration in time range)   glucagon (human recombinant) injection 1 mg (has no administration in time range)   sodium chloride 0.9% flush 10 mL (has no administration in time range)   sodium chloride 0.9% bolus 500 mL 500 mL (has no administration in time range)   labetaloL injection 10 mg  (has no administration in time range)   mupirocin 2 % ointment ( Nasal Given 8/20/23 2003)   clopidogreL tablet 75 mg (75 mg Oral Given 8/21/23 0842)   glucose chewable tablet 16 g (has no administration in time range)   glucose chewable tablet 24 g (has no administration in time range)   glucagon (human recombinant) injection 1 mg (has no administration in time range)   insulin aspart U-100 pen 7 Units (7 Units Subcutaneous Given 8/21/23 0843)   metoprolol succinate (TOPROL-XL) 24 hr tablet 25 mg (25 mg Oral Given 8/21/23 0842)   enoxaparin injection 40 mg (40 mg Subcutaneous Given 8/20/23 1602)   insulin aspart U-100 pen 1-10 Units (8 Units Subcutaneous Given 8/21/23 0843)   insulin aspart U-100 pen 1-10 Units (5 Units Subcutaneous Given 8/17/23 2126)   insulin detemir U-100 (Levemir) pen 25 Units (25 Units Subcutaneous Given 8/21/23 0843)   chlorproMAZINE injection 25 mg (25 mg Intramuscular Given 8/20/23 0915)   gabapentin capsule 400 mg (400 mg Oral Given 8/21/23 0842)   cloNIDine tablet 0.1 mg (has no administration in time range)   lamoTRIgine tablet 25 mg (25 mg Oral Given 8/20/23 2004)     Followed by   lamoTRIgine tablet 50 mg (has no administration in time range)   chlorproMAZINE tablet 25 mg (25 mg Oral Given 8/21/23 0842)   iohexoL (OMNIPAQUE 350) injection 80 mL (80 mLs Intravenous Given 8/15/23 2052)   aspirin tablet 325 mg (325 mg Oral Given 8/15/23 2337)   insulin detemir U-100 (Levemir) pen 20 Units (20 Units Subcutaneous Given 8/16/23 0617)   insulin aspart U-100 pen 20 Units (20 Units Subcutaneous Given 8/16/23 0745)   insulin detemir U-100 (Levemir) pen 5 Units (5 Units Subcutaneous Given 8/19/23 1202)       Medications at home:   Current Discharge Medication List        CONTINUE these medications which have NOT CHANGED    Details   albuterol (PROVENTIL/VENTOLIN HFA) 90 mcg/actuation inhaler Inhale 1-2 puffs into the lungs every 6 (six) hours as needed for Wheezing. Rescue  Qty: 18 g, Refills: 0       gabapentin (NEURONTIN) 300 MG capsule Take 300 mg by mouth 2 (two) times daily.      insulin lispro 100 unit/mL pen Inject 7 Units into the skin 3 (three) times daily with meals.  Qty: 15 mL, Refills: 2    Comments: secure chat per md, can change to pens  11:23 -tl      LANTUS SOLOSTAR U-100 INSULIN glargine 100 units/mL SubQ pen SMARTSI Unit(s) SUB-Q Every Night      aspirin 81 MG Chew Take 1 tablet (81 mg total) by mouth once daily.  Qty: 90 tablet, Refills: 3      atorvastatin (LIPITOR) 20 MG tablet Take 1 tablet (20 mg total) by mouth once daily.  Qty: 90 tablet, Refills: 3      cetirizine (ZYRTEC) 10 MG tablet Take 1 tablet (10 mg total) by mouth once daily.  Qty: 30 tablet, Refills: 0    Associated Diagnoses: Viral URI with cough      insulin detemir U-100, Levemir, 100 unit/mL (3 mL) SubQ InPn pen Inject 20 Units into the skin every evening.  Qty: 15 mL, Refills: 2    Comments: secure chat per md, can change to pens  11:22 -tl      levocetirizine (XYZAL) 5 MG tablet Take 5 mg by mouth every evening.      loratadine (CLARITIN) 10 mg tablet Take 10 mg by mouth.    Associated Diagnoses: History of hepatitis C      naloxone (NARCAN) 4 mg/actuation Spry 4mg by nasal route as needed for opioid overdose; may repeat every 2-3 minutes in alternating nostrils until medical help arrives. Call 911  Qty: 1 each, Refills: 11      nitroGLYCERIN (NITROSTAT) 0.4 MG SL tablet Place 1 tablet (0.4 mg total) under the tongue every 5 (five) minutes as needed for Chest pain.  Qty: 100 tablet, Refills: 0      ONETOUCH DELICA PLUS LANCET 33 gauge Misc Apply topically 4 (four) times daily.      ONETOUCH ULTRA TEST Strp USE TO TEST BLOOD SUGAR FOUR TIMES DAILY      ONETOUCH ULTRA2 METER Misc 4 (four) times daily.      QUEtiapine (SEROQUEL) 100 MG Tab Take 100 mg by mouth every evening.    Associated Diagnoses: History of hepatitis C      sertraline (ZOLOFT) 25 MG tablet Take 25 mg by mouth 2 (two) times daily.     "Associated Diagnoses: History of hepatitis C               Assessment - Diagnosis - Goals:     IMPRESSION:   Unspecified mood d/o, h/o SIMD - per psychiatry 2/3/23: "Patient with chronic, potentially violent agitation and irritability"  Unspecified impulse control d/o  Amphetamine use disorder  Benzodiazepine use disorder  R/o acute encephalopathy, hyperactive  R/o antisocial PD--personality d/o best dx in outpatient setting however noted long h/o behavior in multiple settings that meets DSM criteria  R/o vascular neurocognitive d/o    R/o NPH--outpatient evaluation recommended per neurology    RECOMMENDATIONS:     DISPOSITION: Once medically cleared;   Seek Involuntary Inpatient Psychiatric admission for stabilization of acute psychiatric symptoms and until a safe disposition plan is enacted.      PSYCHIATRIC MEDICATIONS  Scheduled-continue Thorazine 25 mg PO TID  Consider Depakote 500 mg BID for mood lability and migraine prevention instead of Lamictal given inconsistent medication compliance in inpatient setting  PRN-Can also use Thorazine  mg PO/IM every 8 hours PRN acute psychotic or non-redirectable agitation.    LEGAL  Continue CEC because pt is in imminent danger of hurting others. Please continue to provide with 1:1 sitter.     OTHER  Obtain collateral  Recommend repeat EKG to check Qtc if pt will cooperate, Qtc 471 on 8/15/23      Total time including chart review, time with patient, obtaining collateral info[if necessary/possible]: 60        More than 50% of the time was spent counseling/coordinating care    Consulting clinician was informed of the encounter and consult note.    Consultation ended: 8/21/2023 at 12:53 PM      Jimena Laurent MD   Psychiatry  [facility]    "

## 2023-08-22 VITALS
BODY MASS INDEX: 22.72 KG/M2 | RESPIRATION RATE: 18 BRPM | SYSTOLIC BLOOD PRESSURE: 132 MMHG | HEART RATE: 81 BPM | HEIGHT: 68 IN | OXYGEN SATURATION: 98 % | DIASTOLIC BLOOD PRESSURE: 83 MMHG | WEIGHT: 149.94 LBS | TEMPERATURE: 98 F

## 2023-08-22 LAB
ANA SER QL IF: NORMAL
DSDNA AB SER-ACNC: NORMAL [IU]/ML
HCV RNA SERPL QL NAA+PROBE: NOT DETECTED
HCV RNA SPEC NAA+PROBE-ACNC: <12 IU/ML
POCT GLUCOSE: 216 MG/DL (ref 70–110)
POCT GLUCOSE: 260 MG/DL (ref 70–110)
POCT GLUCOSE: 65 MG/DL (ref 70–110)
RPR SER QL: NORMAL

## 2023-08-22 PROCEDURE — 25000003 PHARM REV CODE 250: Performed by: HOSPITALIST

## 2023-08-22 PROCEDURE — 63600175 PHARM REV CODE 636 W HCPCS: Performed by: HOSPITALIST

## 2023-08-22 PROCEDURE — 25000003 PHARM REV CODE 250: Performed by: STUDENT IN AN ORGANIZED HEALTH CARE EDUCATION/TRAINING PROGRAM

## 2023-08-22 RX ORDER — LAMOTRIGINE 25 MG/1
25 TABLET ORAL NIGHTLY
Qty: 12 TABLET | Refills: 0 | Status: SHIPPED | OUTPATIENT
Start: 2023-08-22 | End: 2023-08-22 | Stop reason: SDUPTHER

## 2023-08-22 RX ORDER — LAMOTRIGINE 25 MG/1
50 TABLET ORAL NIGHTLY
Qty: 60 TABLET | Refills: 11 | Status: ON HOLD | OUTPATIENT
Start: 2023-09-03 | End: 2023-08-30 | Stop reason: HOSPADM

## 2023-08-22 RX ORDER — CLOPIDOGREL BISULFATE 75 MG/1
75 TABLET ORAL DAILY
Qty: 30 TABLET | Refills: 0 | Status: ON HOLD | OUTPATIENT
Start: 2023-08-23 | End: 2024-03-11

## 2023-08-22 RX ORDER — METOPROLOL SUCCINATE 25 MG/1
25 TABLET, EXTENDED RELEASE ORAL DAILY
Qty: 30 TABLET | Refills: 11 | Status: ON HOLD | OUTPATIENT
Start: 2023-08-23 | End: 2024-03-11

## 2023-08-22 RX ADMIN — CHLORPROMAZINE HYDROCHLORIDE 25 MG: 25 TABLET, FILM COATED ORAL at 08:08

## 2023-08-22 RX ADMIN — FERROUS SULFATE TAB 325 MG (65 MG ELEMENTAL FE) 1 EACH: 325 (65 FE) TAB at 08:08

## 2023-08-22 RX ADMIN — Medication 6 MG: at 12:08

## 2023-08-22 RX ADMIN — CYANOCOBALAMIN 1000 MCG: 1000 INJECTION, SOLUTION INTRAMUSCULAR; SUBCUTANEOUS at 08:08

## 2023-08-22 RX ADMIN — ATORVASTATIN CALCIUM 20 MG: 10 TABLET, FILM COATED ORAL at 08:08

## 2023-08-22 RX ADMIN — CHLORPROMAZINE HYDROCHLORIDE 25 MG: 25 INJECTION INTRAMUSCULAR at 06:08

## 2023-08-22 RX ADMIN — INSULIN ASPART 9 UNITS: 100 INJECTION, SOLUTION INTRAVENOUS; SUBCUTANEOUS at 08:08

## 2023-08-22 RX ADMIN — METOPROLOL SUCCINATE 25 MG: 25 TABLET, EXTENDED RELEASE ORAL at 08:08

## 2023-08-22 RX ADMIN — INSULIN ASPART 4 UNITS: 100 INJECTION, SOLUTION INTRAVENOUS; SUBCUTANEOUS at 08:08

## 2023-08-22 RX ADMIN — ASPIRIN 81 MG CHEWABLE TABLET 81 MG: 81 TABLET CHEWABLE at 08:08

## 2023-08-22 RX ADMIN — CLOPIDOGREL BISULFATE 75 MG: 75 TABLET, FILM COATED ORAL at 08:08

## 2023-08-22 NOTE — ASSESSMENT & PLAN NOTE
-Noted agitation, combativeness and grandiosity of thoughts on admit  -Seen by tele-psych in ER and given provisional diagnosis of Ila and r/o mood disorder  -Placed under PEC and recommended inpatient psych placement once medically stable  -Patient remains with severe aggression, agitation and combativeness.  Did not threaten me with physical harm today.    -Noting ventriculomegaly on CT - could NPH be a contributor to his difficulty ambulating, emotional lability and urinary incontinence?  Also, is neudexta indicated for possible PBA for him?  Reconsulted tele-neuro 8/20 and discussed with Dr. Kendrick.  NPH testing should not be done inpatient, especially in light of the sedating medications he is requiring, active psyhiatric issues and acute stroke - all of which can cloud the needed testing.  No indication noted at this time for neudexta.  Will need outpatient testing for NPH with neurology.    -Per psych recs 8/19 will rule out cryoglobulinemia, endocrinopathy and iron deficiency (cryoglobulin, BEAR, dsDNA, complement, SSa, SSb, ANCA, RF, ESR, CRP, Sm/RNP), HCV viral load, B1. Once more calm and participatory can order MRI brain.   -He is iron deficient - starting FeSO4 8/21   -ESR and CRP are elevated (63 and 10.7 respectively)   -He is B12 deficient - start B12 replacement 8/21   -Complement levels and TSH normal.  HIV non-reactive    -Per psych recs 8/19:   A. 8/20 Started thorazine 25mg po tid if will take oral.  Otherwise can use thorazine 25mg IM q2h until patient is calm (STILL NOT CALM).     B. 8/20 Start gabapentin 400mg po tid for possible benzo withdrawal agitation component.     C. 8/20 Added clonidine 0.1mg bid prn breakthrough agitation (1st line)   D. 8/20 Added lamotrigine 25mg qhs x 2 weeks then 50mg qhs.   E: 8/20 stopped seroquel 100mg qhs.      F. 8/20 Stopped PRN ativan (goal is to cover possible benzo withdrawal with gabapentin)   G. Do not start: Trazodone (risk of falls and orthostatic  hypotension), Geodon (can worsen mainia and agitation), Depakote (can worsen ventriculomegaly), Trileptal or Tegratol (risk of coronary adverse effects    -Ultimately he needs inpatient psychiatric care, but is not medically cleared at this time.  Must be able to self toilet, feed and bath without need for assistance.  Further, I agree with psychiatry that he lacks capacity for medical decision making as well as recommendation for pursuing guardianship/conservatoryship.

## 2023-08-22 NOTE — NURSING
Patient was yelling and using colorful words with staff at 0700 and security was called  to handle outburst.  Patient is quite at this time, he allowed the nurse to take his morning v/s and glucose, will continue to monitor behavior.

## 2023-08-22 NOTE — ASSESSMENT & PLAN NOTE
-Admitted to inpatient status  -Presented for evaluation of backwards fall a week ago and vision changes since then  -Noted CT head (8/2) showing a new left cerebellar infarct.  Patient left AMA at that time  -In ER this admit, CTA head showed cut off involving a part of the P2 segment of the right PCA, subacute to chronic infarct involving the right medial temporal lobe in the right occipital lobe & remote right pontine and left lateral cerebellar hemisphere infarcts.    -Suspect related to substance abuse - noted amphetamines in UDS  -A1c 9.5   , HDL 45,   LDL measurement invalid  -Echo showedEF 55-60%, diastolic dysfunction, negative bubble study.  -CUS showed 50-69% stenosis of the right ICA and 1-49% stenosis of the left ICA  -Consulted tele-neurology, PT, OT and SLP - input appreciated  -PT/OT recommend HH with PT, OT and SLP at discharge.  -Clinically improving - states vision symptoms have resolved, but nurses (and family) note that his vision is very poor.  He does not participate with acuity testing.  Technically he is medically stable for discharge from hospital, however he needs inpatient psychiatric hospitalization and we not at all confident he can safely navigate in that environment yet  -Continue DAPT (asa 81 + plavix 75) x 21 days and then stop aspirin.  Last day forDAPT will be 9/5  -Continue statin  -Continue to pursue normotension  -Discharge pending ability to safely navigate around psych unit or placement in a medical psych unit, or alternatively clearance from psychiatry to lift PEC/CEC and discharge home with HH.  At this time he is not medically cleared for an inpatient psych - must be able to self feed, self toilet and ambulate safely in psych unit.  Pt walking in room and feeding himself. Spoke with [facility] attending, but still no call for accepting.

## 2023-08-22 NOTE — SUBJECTIVE & OBJECTIVE
Interval History: Pt eating lunch on entering room. States he wants a ride home otherwise refuses to talk with me. Still pending placement. Discussed case with [facility] med-psych attending, but no word on accepting facility.    Review of Systems  Objective:     Vital Signs (Most Recent):  Temp: 97.8 °F (36.6 °C) (08/22/23 0745)  Pulse: 86 (08/22/23 1518)  Resp: 18 (08/22/23 1518)  BP: 100/60 (08/22/23 1518)  SpO2: 98 % (08/22/23 1518) Vital Signs (24h Range):  Temp:  [97.5 °F (36.4 °C)-97.9 °F (36.6 °C)] 97.8 °F (36.6 °C)  Pulse:  [81-92] 86  Resp:  [16-18] 18  SpO2:  [97 %-99 %] 98 %  BP: (100-142)/(60-74) 100/60     Weight: 68 kg (149 lb 14.6 oz)  Body mass index is 22.79 kg/m².    Intake/Output Summary (Last 24 hours) at 8/22/2023 1622  Last data filed at 8/22/2023 1248  Gross per 24 hour   Intake 480 ml   Output --   Net 480 ml         Physical Exam  Vitals reviewed.   Constitutional:       General: He is not in acute distress.     Appearance: He is ill-appearing.   HENT:      Head: Normocephalic and atraumatic.   Pulmonary:      Effort: Pulmonary effort is normal. No respiratory distress.   Neurological:      Mental Status: He is alert and oriented to person, place, and time. Mental status is at baseline.   Psychiatric:         Mood and Affect: Affect is angry.             Significant Labs: All pertinent labs within the past 24 hours have been reviewed.    Significant Imaging: I have reviewed all pertinent imaging results/findings within the past 24 hours.

## 2023-08-22 NOTE — NURSING
Patient allowed writer to help him to bedside chair without any outburst.   Patient did refused v/s and blood sugar  times two but staff return and patient was more cooperative. Writer received call from main campus regarding placement for patient but didn't  get a call back with confirmation.  Provider was notified and discharge orders were written if bed became available discharge order will remain.

## 2023-08-22 NOTE — NURSING
Patient yelling out, screaming profanities and stating he wants to leave and wants his ring.   Informed patient all belongings secured with security and will be returned upon discharge. Patient continues to use profanities and remains uncooperative to staff directions. PRN Medication administered by primary nurse, Elisa. Patient calmed. Sitter remains at bedside.

## 2023-08-22 NOTE — NURSING
[facility] called with confirmation of patient placement at facility.  Writer gave handoff to  nurse Vasquez and was told that they will arrange transportation. IV removed tried to assist patient with shower but he refused.

## 2023-08-22 NOTE — PROGRESS NOTES
[facility] Medicine  Progress Note    Patient Name: Cali Mabry  MRN: [id number]  Patient Class: IP- Inpatient   Admission Date: 8/15/2023  Length of Stay: 7 days  Attending Physician: Chapito Graham III, MD  Primary Care Provider: Lawanda Flores NP        Subjective:     Principal Problem:Stroke        HPI:  This is a 57-year-old male with a past medical history of type 1 diabetes, COPD (not on O2), CAD, hypertension, hyperlipidemia, substance use, mood disorder who presents with blurry vision.    Patient had multiple ED presentations for headaches, blurry vision, eye pain this month.  On 08/02, he presented to [facility] and had a CT head that showed a new left cerebellar infarct, however left against medical advice.  He returned to  ED on 8/7 and to [facility] ED for worsening vision loss, however left AMA.  He had a brief hospitalization to  on 8/9 for DKA after presenting with similar symptoms as well.  Patient is known to be verbally abusive to staff.    In the ED, the patient was hypertensive.  Labs were remarkable for hypercholesterolemia (297).  UDS was positive for amphetamines and benzodiazepines.  CTA head showed Cut off involving a part of the P2 segment of the right PCA,  subacute to chronic infarct involving the right medial temporal lobe in the right occipital lobe & remote right pontine and left lateral cerebellar hemisphere infarcts.  Vascular Neurology was consulted and stated patient can stay at [facility].  Psychiatry was consulted and recommended pec giving grave disability.  Patient was given aspirin 325 mg and was admitted for further management.      Overview/Hospital Course:  No notes on file    Interval History: Pt eating lunch on entering room. States he wants a ride home otherwise refuses to talk with me. Still pending placement. Discussed case with [facility] med-psych attending, but no word on accepting facility.    Review of Systems  Objective:     Vital Signs (Most  Recent):  Temp: 97.8 °F (36.6 °C) (08/22/23 0745)  Pulse: 86 (08/22/23 1518)  Resp: 18 (08/22/23 1518)  BP: 100/60 (08/22/23 1518)  SpO2: 98 % (08/22/23 1518) Vital Signs (24h Range):  Temp:  [97.5 °F (36.4 °C)-97.9 °F (36.6 °C)] 97.8 °F (36.6 °C)  Pulse:  [81-92] 86  Resp:  [16-18] 18  SpO2:  [97 %-99 %] 98 %  BP: (100-142)/(60-74) 100/60     Weight: 68 kg (149 lb 14.6 oz)  Body mass index is 22.79 kg/m².    Intake/Output Summary (Last 24 hours) at 8/22/2023 1622  Last data filed at 8/22/2023 1248  Gross per 24 hour   Intake 480 ml   Output --   Net 480 ml         Physical Exam  Vitals reviewed.   Constitutional:       General: He is not in acute distress.     Appearance: He is ill-appearing.   HENT:      Head: Normocephalic and atraumatic.   Pulmonary:      Effort: Pulmonary effort is normal. No respiratory distress.   Neurological:      Mental Status: He is alert and oriented to person, place, and time. Mental status is at baseline.   Psychiatric:         Mood and Affect: Affect is angry.             Significant Labs: All pertinent labs within the past 24 hours have been reviewed.    Significant Imaging: I have reviewed all pertinent imaging results/findings within the past 24 hours.      Assessment/Plan:      * Stroke  -Admitted to inpatient status  -Presented for evaluation of backwards fall a week ago and vision changes since then  -Noted CT head (8/2) showing a new left cerebellar infarct.  Patient left AMA at that time  -In ER this admit, CTA head showed cut off involving a part of the P2 segment of the right PCA, subacute to chronic infarct involving the right medial temporal lobe in the right occipital lobe & remote right pontine and left lateral cerebellar hemisphere infarcts.    -Suspect related to substance abuse - noted amphetamines in UDS  -A1c 9.5   , HDL 45,   LDL measurement invalid  -Echo showedEF 55-60%, diastolic dysfunction, negative bubble study.  -CUS showed 50-69% stenosis of the  right ICA and 1-49% stenosis of the left ICA  -Consulted tele-neurology, PT, OT and SLP - input appreciated  -PT/OT recommend HH with PT, OT and SLP at discharge.  -Clinically improving - states vision symptoms have resolved, but nurses (and family) note that his vision is very poor.  He does not participate with acuity testing.  Technically he is medically stable for discharge from hospital, however he needs inpatient psychiatric hospitalization and we not at all confident he can safely navigate in that environment yet  -Continue DAPT (asa 81 + plavix 75) x 21 days and then stop aspirin.  Last day forDAPT will be 9/5  -Continue statin  -Continue to pursue normotension  -Discharge pending ability to safely navigate around psych unit or placement in a medical psych unit, or alternatively clearance from psychiatry to lift PEC/CEC and discharge home with HH.  At this time he is not medically cleared for an inpatient psych - must be able to self feed, self toilet and ambulate safely in psych unit.  Pt walking in room and feeding himself. Spoke with [facility] attending, but still no call for accepting.      Impulse control disorder in adult  -Treatment as above.      Substance induced mood disorder  -Noted agitation, combativeness and grandiosity of thoughts on admit  -Seen by tele-psych in ER and given provisional diagnosis of Ila and r/o mood disorder  -Placed under PEC and recommended inpatient psych placement once medically stable  -Patient remains with severe aggression, agitation and combativeness.  Did not threaten me with physical harm today.    -Noting ventriculomegaly on CT - could NPH be a contributor to his difficulty ambulating, emotional lability and urinary incontinence?  Also, is neudexta indicated for possible PBA for him?  Reconsulted tele-neuro 8/20 and discussed with Dr. Kendrick.  NPH testing should not be done inpatient, especially in light of the sedating medications he is requiring,  active psyhiatric issues and acute stroke - all of which can cloud the needed testing.  No indication noted at this time for neudexta.  Will need outpatient testing for NPH with neurology.    -Per psych recs 8/19 will rule out cryoglobulinemia, endocrinopathy and iron deficiency (cryoglobulin, BEAR, dsDNA, complement, SSa, SSb, ANCA, RF, ESR, CRP, Sm/RNP), HCV viral load, B1. Once more calm and participatory can order MRI brain.   -He is iron deficient - starting FeSO4 8/21   -ESR and CRP are elevated (63 and 10.7 respectively)   -He is B12 deficient - start B12 replacement 8/21   -Complement levels and TSH normal.  HIV non-reactive    -Per psych recs 8/19:   A. 8/20 Started thorazine 25mg po tid if will take oral.  Otherwise can use thorazine 25mg IM q2h until patient is calm (STILL NOT CALM).     B. 8/20 Start gabapentin 400mg po tid for possible benzo withdrawal agitation component.     C. 8/20 Added clonidine 0.1mg bid prn breakthrough agitation (1st line)   D. 8/20 Added lamotrigine 25mg qhs x 2 weeks then 50mg qhs.   E: 8/20 stopped seroquel 100mg qhs.      F. 8/20 Stopped PRN ativan (goal is to cover possible benzo withdrawal with gabapentin)   G. Do not start: Trazodone (risk of falls and orthostatic hypotension), Geodon (can worsen mainia and agitation), Depakote (can worsen ventriculomegaly), Trileptal or Tegratol (risk of coronary adverse effects    -Ultimately he needs inpatient psychiatric care, but is not medically cleared at this time.  Must be able to self toilet, feed and bath without need for assistance.  Further, I agree with psychiatry that he lacks capacity for medical decision making as well as recommendation for pursuing guardianship/conservatoryship.          Noncompliance with medication regimen  -Noted history as well as illicit substance use and prior AMA  -Counseled     Type 1 diabetes  -Last A1c 10.4 1/27/23  -A1c now 9.5  -Blood sugars are better so far today  -Refuses labs and  accu-checks  -Increase detemir to 30 units qd and lispro 9 units tidwm  -Continue moderate dose SSI ac/hs    CAD (coronary artery disease)  -Denies chest pain  -Continue aspirin and statin  -Monitor on telemetry    COPD (chronic obstructive pulmonary disease)  -No wheezing at this time  -P.r.nKevin Cam.    Polysubstance use disorder  -Urine drug screen positive for amphetamines and benzodiazepines  -Patient denies use  -Likely contributor to medical illness as well as mood alterations and combativeness.  -Counseled on cessation    Hyperlipidemia  -Continue statin.    Primary hypertension  -BP normal to moderately elevated  -Given subacute nature of his stroke - pursuing normotension  -No BP meds on home list  -Given CAD history added metoprolol 8/16 - continue for now      VTE Risk Mitigation (From admission, onward)         Ordered     enoxaparin injection 40 mg  Every 24 hours         08/16/23 1155     IP VTE LOW RISK PATIENT  Once         08/16/23 0025     Place sequential compression device  Until discontinued         08/16/23 0025                Discharge Planning   ROLA: 8/22/2023     Code Status: Full Code   Is the patient medically ready for discharge?:     Reason for patient still in hospital (select all that apply): Pending disposition  Discharge Plan A: Psychiatric hospital   Discharge Delays: (!) PFC Arranged Transportation              Chapito Graham III, MD  Department of Hospital Medicine   [facility] - Telemetry

## 2023-08-22 NOTE — PLAN OF CARE
Problem: Adjustment to Illness (Delirium)  Goal: Optimal Coping  Outcome: Ongoing, Progressing

## 2023-08-22 NOTE — NURSING
[facility], [facility]  Nurses Note -- 4 Eyes      8/22/2023       Skin assessed on: Q Shift      [x] No Pressure Injuries Present    []Prevention Measures Documented    [] Yes LDA  for Pressure Injury Previously documented     [] Yes New Pressure Injury Discovered   [] LDA for New Pressure Injury Added      Attending RN:  Bria Bustillos RN     Second RN:  RITA Pérez

## 2023-08-22 NOTE — NURSING
[facility], [facility]  Nurses Note -- 4 Eyes      8/21/2023       Skin assessed on: Q Shift      [x] No Pressure Injuries Present    [x]Prevention Measures Documented    [] Yes LDA  for Pressure Injury Previously documented     [] Yes New Pressure Injury Discovered   [] LDA for New Pressure Injury Added      Attending RN:  Elisa Schilling RN     Second RN:  Radha EASON LPN

## 2023-08-22 NOTE — PLAN OF CARE
Pt's cousin, Marbella contacted CM requesting pt's belongings. CM contacted Cathy, charge nurse and was told that pt's belongings will transfer with pt to psych facility.     CM informed pt's cousin.

## 2023-08-23 LAB
ANCA AB TITR SER IF: NORMAL TITER
ANTI SM/RNP ANTIBODY: 0.12 RATIO (ref 0–0.99)
ANTI-SM/RNP INTERPRETATION: NEGATIVE
ANTI-SSA ANTIBODY: 0.13 RATIO (ref 0–0.99)
ANTI-SSA INTERPRETATION: NEGATIVE
ANTI-SSB ANTIBODY: 0.1 RATIO (ref 0–0.99)
ANTI-SSB INTERPRETATION: NEGATIVE
APTT IMM NP PPP: NORMAL SEC (ref 32–48)
APTT P HEP NEUT PPP: NORMAL SEC (ref 32–48)
CARDIOLIPIN IGG SER IA-ACNC: <9.4 GPL (ref 0–14.99)
CARDIOLIPIN IGM SER IA-ACNC: <9.4 MPL (ref 0–12.49)
CH50 SERPL-ACNC: 73 U/ML (ref 42–95)
CONFIRM APTT STACLOT: NORMAL
DRVVT SCREEN TO CONFIRM RATIO: NORMAL RATIO
LA 3 SCREEN W REFLEX-IMP: NORMAL
LA NT DPL PPP QL: NORMAL
MIXING DRVVT: NORMAL SEC (ref 33–44)
P-ANCA TITR SER IF: NORMAL TITER
PROTHROMBIN TIME: 12.2 SEC (ref 12–15.5)
REPTILASE TIME: NORMAL SEC
SCREEN APTT: 46 SEC (ref 32–48)
SCREEN DRVVT: 37 SEC (ref 33–44)
THROMBIN TIME: NORMAL SEC (ref 14.7–19.5)

## 2023-08-23 NOTE — NURSING
Pt discharged to [facility] via [facility]. Original CEC and PEC given to EMS.  Pt belonging given per security.  at the bedside to escort pt out.  No distress noted at the time of discharge. Signing off

## 2023-08-23 NOTE — PHYSICIAN QUERY
PT Name: Cali Mabry  MR #: [id number]     DOCUMENTATION CLARIFICATION     CDS/: Gianna Kimble RN BSN            Contact information:[email or web address]  This form is a permanent document in the medical record.     Query Date: August 23, 2023    By submitting this query, we are merely seeking further clarification of documentation.  Please utilize your independent clinical judgment when addressing the question(s) below.    The Medical Record contains the following   Indicators Supporting Clinical Findings Location in Medical Record   x Heart Failure documented  8/15 ED  PMH  CHF    8/16 Neurology Consult  Past medical history CHF 8/15/23 ED      8/16/23 Neurology Consult    BNP     x EF/Echo 8/16 ECHO    Left Ventricle: The left ventricle is normal in size. Normal wall thickness. There is concentric remodeling. Normal wall motion. There is normal systolic function with a visually estimated ejection fraction of 55 - 60%. There is diastolic dysfunction.    Right Ventricle: Normal right ventricular cavity size. Wall thickness is normal. Right ventricle wall motion  is normal. Systolic function is normal.    Left Atrium: Agitated saline study of the atrial septum is negative, suggesting absence of intracardiac shunt at the atrial level.    No intracardiac source of embolus noted on this exam.  If high clinical suspicion, consider FREYA +/- bubble study 8/16/23 ECHO    Radiology findings      Subjective/Objective Respiratory Conditions      Recent/Current MI      Heart Transplant, LVAD      Edema, JVD      Ascites      Diuretics/Meds      Other Treatment     x Other 8/17  PN  Echo showedEF 55-60%, diastolic dysfunction, negative bubble study. 8/17/23 Hospital Medicine Progress note     Heart failure is a clinical diagnosis which includes symptomatic fluid retention, elevated intracardiac pressures, and/or the inability of the heart to deliver adequate blood flow.    Heart Failure with reduced  Ejection Fraction (HFrEF) or Systolic Heart Failure (loses ability to contract normally, EF is <40%)    Heart Failure with preserved Ejection Fraction (HFpEF) or Diastolic Heart Failure (stiff ventricles, does not relax properly, EF is >50%)     Heart Failure with Combined Systolic and Diastolic Failure (stiff ventricles, does not relax properly and EF is <50%)    Mid-range or mildly reduced ejection fraction (HFmrEF) is classified as systolic heart failure.  Congestive heart failure with a recovered EF is classified as Diastolic Heart Failure.  Common clues to acute exacerbation:  Rapidly progressive symptoms (w/in 2 weeks of presentation), using IV diuretics, using supplemental O2, pulmonary edema on Xray, new or worsening pleural effusion, +JVD or other signs of volume overload, MI w/in 4 weeks, and/or BNP >500  The clinical guidelines noted are only system guidelines, and do not replace the providers clinical judgment.    Provider, please clarify the CHF diagnosis:    [  x ]  Chronic Diastolic Heart Failure (HFpEF) - preexisting and stable   [   ]  Other (please specify): ___________________________________           Please document in your progress notes daily for the duration of treatment until resolved and include in your discharge summary.    References:  American Heart Association editorial staff. (2017, May). Ejection Fraction Heart Failure Measurement. American Heart Association. https://www.heart.org/en/health-topics/heart-failure/diagnosing-heart-failure/ejection-fraction-heart-failure-measurement#:~:text=Ejection%20fraction%20(EF)%20is%20a,pushed%20out%20with%20each%20heartbeat  SLOAN Gutierrez (2020, December 15). Heart failure with preserved ejection fraction: Clinical manifestations and diagnosis. [employer]. https://www.[employer]oma.com/contents/heart-failure-with-preserved-ejection-fraction-clinical-manifestations-and-diagnosis.  ICD-10-CM/PCS Coding Clinic Third Quarter ICD-10, Effective with  discharges: September 8, 2020 [facility] § Heart failure with mid-range or mildly reduced ejection fraction (2020).  ICD-10-CM/PCS Coding Clinic Third Quarter ICD-10, Effective with discharges: September 8, 2020 [facility] [place] § Heart failure with recovered ejection fraction (2020).  Form No. [id number]

## 2023-08-24 LAB — VIT B1 BLD-MCNC: 62 UG/L (ref 38–122)

## 2023-08-24 NOTE — HOSPITAL COURSE
57M with pmh of diabetes, COPD (not on O2), CAD, hypertension, hyperlipidemia, substance use, mood disorder who presents with blurry vision. In the ED, the patient was hypertensive.  Labs were remarkable for hypercholesterolemia (297).  UDS was positive for amphetamines and benzodiazepines.  CTA head showed Cut off involving a part of the P2 segment of the right PCA,  subacute to chronic infarct involving the right medial temporal lobe in the right occipital lobe & remote right pontine and left lateral cerebellar hemisphere infarcts.  Vascular Neurology was consulted and stated patient can stay at [facility].  Psychiatry was consulted and recommended pec giving grave disability.  Patient was given aspirin 325 mg and was admitted for further management. Pt started on DAPT therapy per neurology. Pt clinically improved and medically stable for d/c to inpatient psych unit pending placement. Pt accepted to [facility] Psychiatry unit and transferred for further medical/psychiatric care

## 2023-08-24 NOTE — DISCHARGE SUMMARY
[facility] Medicine  Discharge Summary      Patient Name: Cali Mabry  MRN: [id number]  [facility]: [id number]  Patient Class: IP- Inpatient  Admission Date: 8/15/2023  Hospital Length of Stay: 7 days  Discharge Date and Time: 8/22/2023  8:15 PM  Attending Physician: No att. providers found   Discharging Provider: Chapito Graham III, MD  Primary Care Provider: Lawanda Flores NP    Primary Care Team: Networked reference to record PCT     HPI:   This is a 57-year-old male with a past medical history of type 1 diabetes, COPD (not on O2), CAD, hypertension, hyperlipidemia, substance use, mood disorder who presents with blurry vision.    Patient had multiple ED presentations for headaches, blurry vision, eye pain this month.  On 08/02, he presented to [facility] and had a CT head that showed a new left cerebellar infarct, however left against medical advice.  He returned to  ED on 8/7 and to [facility] ED for worsening vision loss, however left AMA.  He had a brief hospitalization to  on 8/9 for DKA after presenting with similar symptoms as well.  Patient is known to be verbally abusive to staff.    In the ED, the patient was hypertensive.  Labs were remarkable for hypercholesterolemia (297).  UDS was positive for amphetamines and benzodiazepines.  CTA head showed Cut off involving a part of the P2 segment of the right PCA,  subacute to chronic infarct involving the right medial temporal lobe in the right occipital lobe & remote right pontine and left lateral cerebellar hemisphere infarcts.  Vascular Neurology was consulted and stated patient can stay at [facility].  Psychiatry was consulted and recommended pec giving grave disability.  Patient was given aspirin 325 mg and was admitted for further management.      * No surgery found *      Hospital Course:   57M with pmh of diabetes, COPD (not on O2), CAD, hypertension, hyperlipidemia, substance use, mood disorder who presents with blurry vision. In the ED, the  patient was hypertensive.  Labs were remarkable for hypercholesterolemia (297).  UDS was positive for amphetamines and benzodiazepines.  CTA head showed Cut off involving a part of the P2 segment of the right PCA,  subacute to chronic infarct involving the right medial temporal lobe in the right occipital lobe & remote right pontine and left lateral cerebellar hemisphere infarcts.  Vascular Neurology was consulted and stated patient can stay at [facility].  Psychiatry was consulted and recommended pec giving grave disability.  Patient was given aspirin 325 mg and was admitted for further management. Pt started on DAPT therapy per neurology. Pt clinically improved and medically stable for d/c to inpatient psych unit pending placement. Pt accepted to [facility] Psychiatry unit and transferred for further medical/psychiatric care       Goals of Care Treatment Preferences:  Code Status: Full Code      Consults:   Consults (From admission, onward)        Status Ordering Provider     Inpatient consult to Telemedicine - Psych  Once        Provider:  (Not yet assigned)    Completed INDIRA HUERTA     Inpatient consult to Telemedicine-General Neurology  Once        Provider:  Lyssa Kendrick MD    Completed INDIRA HUERTA     Inpatient consult to Social Work  Once        Provider:  (Not yet assigned)    Completed INDIRA HUERTA     Inpatient consult to Telemedicine - Psych  Once        Provider:  Brenda Campbell MD    Completed INDIRA HUERTA     Inpatient consult to Social Work  Once        Provider:  (Not yet assigned)    Completed INDIRA HUERTA     Inpatient consult to Registered Dietitian/Nutritionist  Once        Provider:  (Not yet assigned)    Completed MAI ISIDRO     IP consult to case management/social work  Once        Provider:  (Not yet assigned)    Completed MAI ISIDRO     Inpatient consult to Telemedicine-General Neurology  Once        Provider:  Shiva Griffith MD    Completed ASNNA,  MAI Matthew new Assessment & Plan notes have been filed under this hospital service since the last note was generated.  Service: Hospital Medicine    Final Active Diagnoses:    Diagnosis Date Noted POA    PRINCIPAL PROBLEM:  Stroke [I63.9] 08/16/2023 Yes    Impulse control disorder in adult [F63.9] 08/19/2023 Yes    Substance induced mood disorder [F19.94] 09/19/2022 Yes    Noncompliance with medication regimen [Z91.148] 09/17/2022 Not Applicable    Type 1 diabetes [E10.9] 05/02/2021 Yes    CAD (coronary artery disease) [I25.10] 08/13/2020 Yes     Chronic    COPD (chronic obstructive pulmonary disease) [J44.9] 08/08/2020 Yes    Polysubstance use disorder [F19.90] 03/12/2019 Yes    Hyperlipidemia [E78.5] 11/23/2017 Yes     Chronic    Primary hypertension [I10] 04/03/2016 Yes      Problems Resolved During this Admission:       Discharged Condition: fair    Disposition: Psychiatric Hospital    Follow Up:   Follow-up Information     Lawanda Flores NP. Go on 8/23/2023.    Specialty: Family Medicine  Why: already scheduled appointment at 3:00 pm.  Contact information:  [address] [address]  [address]  [facility] [id number] [phone]             Doroteo Louis MD. Go on 8/30/2023.    Specialty: Pulmonary Disease  Why: already scheduled appointment at 11:30 am.  Contact information:  [address] [place] [address]  [address]  [facility] [address]  [phone]             Tiny Ram MD. Go on 8/25/2023.    Specialty: Endocrinology  Why: already scheduled appointment at 9:00 am  Contact information:  [address] [place] [address]  [address]  [place] [address]  [phone]                       Patient Instructions:      Diet diabetic     Reason for not Ordering Smoking Cessation Referral     Order Specific Question Answer Comments   Reason for not ordering: Patient refused      Reason for not Prescribing Nicotine Replacement     Order Specific Question Answer Comments   Reason for not Prescribing: Patient  refused      Activity as tolerated       Significant Diagnostic Studies: Labs: All labs within the past 24 hours have been reviewed    Pending Diagnostic Studies:     Procedure Component Value Units Date/Time    Complement, Alternate Pathway (AH50) [[id number]] Collected: 08/20/23 2022    Order Status: Sent Lab Status: In process Updated: 08/20/23 2036    Specimen: Blood     Narrative:      Collection has been rescheduled by [facility] at 08/20/2023 15:44 Reason:   Patient Refused Nurse was notified  Collection has been rescheduled by [facility] at 08/20/2023 20:01 Reason:   Nurse want labs drawn at 8:30 pm    Cryoglobulin [[id number]] Collected: 08/20/23 2022    Order Status: Sent Lab Status: In process Updated: 08/20/23 2036    Specimen: Blood     Narrative:      Collection has been rescheduled by [facility] at 08/20/2023 09:11 Reason:   Patient Refused RN  Collection has been rescheduled by [facility] at 08/20/2023 09:13 Reason:   Patient Refused RN will call Lab  Collection has been rescheduled by [facility] at 08/20/2023 15:44 Reason:   Patient Refused Nurse was notified  Collection has been rescheduled by [facility] at 08/20/2023 20:01 Reason:   Nurse want labs drawn at 8:30 pm  Collection has been rescheduled by [facility] at 08/20/2023 09:11 Reason:   Patient Refused RN  Collection has been rescheduled by [facility] at 08/20/2023 09:13 Reason:   Patient Refused RN will call Lab  Collection has been rescheduled by [facility] at 08/20/2023 15:44 Reason:   Patient Refused Nurse was notified  Collection has been rescheduled by [facility] at 08/20/2023 20:01 Reason:   Nurse want labs drawn at 8:30 pm    EKG 12-lead [[id number]]     Order Status: Sent Lab Status: No result          Medications:  Reconciled Home Medications:      Medication List      START taking these medications    clopidogreL 75 mg tablet  Commonly known as: PLAVIX  Take 1 tablet (75 mg total) by mouth once daily.     lamoTRIgine 25 MG tablet  Commonly known as: LAMICTAL  Take 1 tablet (25mg total) by mouth every  evening for 12 days, then increase to  2 tablets (50 mg total) by mouth every evening.  Start taking on: September 3, 2023     metoprolol succinate 25 MG 24 hr tablet  Commonly known as: TOPROL-XL  Take 1 tablet (25 mg total) by mouth once daily.        CONTINUE taking these medications    albuterol 90 mcg/actuation inhaler  Commonly known as: PROVENTIL/VENTOLIN HFA  Inhale 1-2 puffs into the lungs every 6 (six) hours as needed for Wheezing. Rescue     aspirin 81 MG Chew  Take 1 tablet (81 mg total) by mouth once daily.     atorvastatin 20 MG tablet  Commonly known as: LIPITOR  Take 1 tablet (20 mg total) by mouth once daily.     cetirizine 10 MG tablet  Commonly known as: ZYRTEC  Take 1 tablet (10 mg total) by mouth once daily.     gabapentin 300 MG capsule  Commonly known as: NEURONTIN  Take 300 mg by mouth 2 (two) times daily.     insulin detemir U-100 (Levemir) 100 unit/mL (3 mL) Inpn pen  Inject 20 Units into the skin every evening.     insulin lispro 100 unit/mL pen  Inject 7 Units into the skin 3 (three) times daily with meals.     LANTUS SOLOSTAR U-100 INSULIN glargine 100 units/mL SubQ pen  Generic drug: insulin  SMARTSI Unit(s) SUB-Q Every Night     levocetirizine 5 MG tablet  Commonly known as: XYZAL  Take 5 mg by mouth every evening.     loratadine 10 mg tablet  Commonly known as: CLARITIN  Take 10 mg by mouth.     naloxone 4 mg/actuation Spry  Commonly known as: NARCAN  4mg by nasal route as needed for opioid overdose; may repeat every 2-3 minutes in alternating nostrils until medical help arrives. Call 911     nitroGLYCERIN 0.4 MG SL tablet  Commonly known as: NITROSTAT  Place 1 tablet (0.4 mg total) under the tongue every 5 (five) minutes as needed for Chest pain.     ONETOUCH DELICA PLUS LANCET 33 gauge Misc  Generic drug: lancets  Apply topically 4 (four) times daily.     ONETOUCH ULTRA TEST Strp  Generic drug: blood sugar diagnostic  USE TO TEST BLOOD SUGAR FOUR TIMES DAILY     ONETOUCH ULTRA2  METER Misc  Generic drug: blood-glucose meter  4 (four) times daily.        STOP taking these medications    QUEtiapine 100 MG Tab  Commonly known as: SEROQUEL     sertraline 25 MG tablet  Commonly known as: ZOLOFT            Indwelling Lines/Drains at time of discharge:   Lines/Drains/Airways     None                 Time spent on the discharge of patient: >35 minutes         Chapito Graham III, MD  Department of Hospital Medicine  [facility]

## 2023-08-26 LAB — POCT GLUCOSE: 146 MG/DL (ref 70–110)

## 2023-08-29 LAB — AH50 ACT/NOR SER IA: 94 %OF NORM

## 2023-08-30 PROBLEM — F31.9 BIPOLAR DISORDER: Status: ACTIVE | Noted: 2023-08-30

## 2023-08-31 LAB — CRYOGLOB SER QL: NORMAL

## 2023-09-06 NOTE — PROGRESS NOTES
CHW - Case Closure    This Community Health Worker spoke to patient via telephone today.   Pt reported: Patient answered the phone and in a few seconds disconnected the call with this CHW.  Pt denied any additional needs at this time and agrees with episode closure at this time.  Provided patient with Community Health Worker's contact information and encouraged him to contact this Community Health Worker if additional needs arise.

## 2023-09-20 ENCOUNTER — CLINICAL SUPPORT (OUTPATIENT)
Dept: OPHTHALMOLOGY | Facility: CLINIC | Age: 58
End: 2023-09-20
Payer: MEDICARE

## 2023-09-20 ENCOUNTER — OFFICE VISIT (OUTPATIENT)
Dept: OPHTHALMOLOGY | Facility: CLINIC | Age: 58
End: 2023-09-20
Payer: MEDICARE

## 2023-09-20 DIAGNOSIS — H53.462 HH (HOMONYMOUS HEMIANOPIA), LEFT: ICD-10-CM

## 2023-09-20 DIAGNOSIS — H53.40 VISUAL FIELD DEFECT: Primary | ICD-10-CM

## 2023-09-20 PROCEDURE — 99999 PR PBB SHADOW E&M-EST. PATIENT-LVL III: CPT | Mod: PBBFAC,,, | Performed by: OPHTHALMOLOGY

## 2023-09-20 PROCEDURE — 1111F PR DISCHARGE MEDS RECONCILED W/ CURRENT OUTPATIENT MED LIST: ICD-10-PCS | Mod: CPTII,S$GLB,, | Performed by: OPHTHALMOLOGY

## 2023-09-20 PROCEDURE — 1160F RVW MEDS BY RX/DR IN RCRD: CPT | Mod: CPTII,S$GLB,, | Performed by: OPHTHALMOLOGY

## 2023-09-20 PROCEDURE — 1159F MED LIST DOCD IN RCRD: CPT | Mod: CPTII,S$GLB,, | Performed by: OPHTHALMOLOGY

## 2023-09-20 PROCEDURE — 99203 PR OFFICE/OUTPT VISIT, NEW, LEVL III, 30-44 MIN: ICD-10-PCS | Mod: S$GLB,,, | Performed by: OPHTHALMOLOGY

## 2023-09-20 PROCEDURE — 99999 PR PBB SHADOW E&M-EST. PATIENT-LVL III: ICD-10-PCS | Mod: PBBFAC,,, | Performed by: OPHTHALMOLOGY

## 2023-09-20 PROCEDURE — 3046F HEMOGLOBIN A1C LEVEL >9.0%: CPT | Mod: CPTII,S$GLB,, | Performed by: OPHTHALMOLOGY

## 2023-09-20 PROCEDURE — 99203 OFFICE O/P NEW LOW 30 MIN: CPT | Mod: S$GLB,,, | Performed by: OPHTHALMOLOGY

## 2023-09-20 PROCEDURE — 1160F PR REVIEW ALL MEDS BY PRESCRIBER/CLIN PHARMACIST DOCUMENTED: ICD-10-PCS | Mod: CPTII,S$GLB,, | Performed by: OPHTHALMOLOGY

## 2023-09-20 PROCEDURE — 3046F PR MOST RECENT HEMOGLOBIN A1C LEVEL > 9.0%: ICD-10-PCS | Mod: CPTII,S$GLB,, | Performed by: OPHTHALMOLOGY

## 2023-09-20 PROCEDURE — 1111F DSCHRG MED/CURRENT MED MERGE: CPT | Mod: CPTII,S$GLB,, | Performed by: OPHTHALMOLOGY

## 2023-09-20 PROCEDURE — 1159F PR MEDICATION LIST DOCUMENTED IN MEDICAL RECORD: ICD-10-PCS | Mod: CPTII,S$GLB,, | Performed by: OPHTHALMOLOGY

## 2023-09-20 RX ORDER — INSULIN LISPRO 100 [IU]/ML
10 INJECTION, SOLUTION INTRAVENOUS; SUBCUTANEOUS
Status: ON HOLD | COMMUNITY
Start: 2023-08-31 | End: 2024-03-18

## 2023-09-20 NOTE — PROGRESS NOTES
HPI    Referred by Dr.Debra Flores NP  Hx of CVA x 2 months.  Headaches daily --4-5 on pain scale.  Patient states OU vision seem to come and go on a daily.  No eye pain.    BS unstable daily    I have personally interviewed the patient, reviewed the history and   examined the patient and agree with the technician's exam.   Last edited by Jered Martin MD on 9/20/2023 11:38 AM.            Assessment /Plan     For exam results, see Encounter Report.    Visual field defect  -     Hernandez Visual Field - OU - Extended - Both Eyes; Future    HH (homonymous hemianopia), left      Mr. Mabry has a complete left homonymous hemianopia consistent with a lesion of the right occipital lobe. This status was confirmed by his imaging studies. The likelihood of significant recovery is poor. I recommended that Mr. Mabry seek out the services of a low vision clinic such as the one at [facility] or the [facility]. He will return to me as needed.

## 2023-09-20 NOTE — LETTER
[facility] - [address]  [address]  [facility] [address]  Phone: [phone]  Fax: [phone]   September 20, 2023    Lawanda Flores, [place]REE  [address]  [address]  [facility] [address]    Patient: Cali Mabry   MR Number: [id number]   YOB: 1965   Date of Visit: 9/20/2023       Dear Dr. Flores:    Thank you for referring Cali Mabry to me for evaluation. Here is my assessment and plan of care:    Assessment/Plan    For exam results, see Encounter Report.    Visual field defect  -     Hernandez Visual Field - OU - Extended - Both Eyes; Future    HH (homonymous hemianopia), left      Mr. Mabry has a complete left homonymous hemianopia consistent with a lesion of the right occipital lobe. This status was confirmed by his imaging studies. The likelihood of significant recovery is poor. I recommended that Mr. Mabry seek out the services of a low vision clinic such as the one at [facility] or the [facility]. He will return to me as needed.          Below you will find my full exam findings. If you have questions, please do not hesitate to call me. I look forward to following Mr. Cali Mabry along with you.    Sincerely,          Jered Martin MD       CC  No Recipients             Base Eye Exam       Visual Acuity (Snellen - Linear)         Right Left    Dist cc 20/40 20/400      Correction: Glasses              Tonometry (Applanation, 11:53 AM)         Right Left    Pressure 22 18   Squeezing lids             Pupils         Dark Light Shape React APD    Right 4 2 Round Brisk None    Left 4 2 Round Brisk None              Visual Fields         Right Left    Restrictions Total superior temporal, inferior temporal deficiencies Total superior nasal, inferior nasal deficiencies              Extraocular Movement         Right Left     Full, Ortho Full, Ortho              Neuro/Psych       Oriented x3: Yes    Mood/Affect: Normal              Dilation        Both eyes: 2.5% Phenylephrine, 1% Mydriacyl @ 11:51 AM                  Slit Lamp and Fundus Exam       External Exam         Right Left    External Normal Normal              Slit Lamp Exam         Right Left    Lids/Lashes Normal Normal    Conjunctiva/Sclera White and quiet White and quiet    Cornea Clear Clear    Anterior Chamber Deep and quiet Deep and quiet    Iris Round and reactive Round and reactive    Lens Clear Clear    Vitreous Normal Normal              Fundus Exam         Right Left    Disc Normal Normal    Macula Normal Normal    Vessels Normal Normal    Periphery Normal Normal

## 2023-09-20 NOTE — PATIENT INSTRUCTIONS
Mr. Mabry has a complete left homonymous hemianopia consistent with a lesion of the right occipital lobe. This status was confirmed by his imaging studies. The likelihood of significant recovery is poor. I recommended that Mr. Mabry seek out the services of a low vision clinic such as the one at [facility] or the [facility]. He will return to me as needed.

## 2023-10-05 ENCOUNTER — HOSPITAL ENCOUNTER (EMERGENCY)
Facility: HOSPITAL | Age: 58
Discharge: HOME | End: 2023-10-05
Attending: EMERGENCY MEDICINE
Payer: MEDICARE

## 2023-10-05 VITALS
OXYGEN SATURATION: 98 % | DIASTOLIC BLOOD PRESSURE: 74 MMHG | BODY MASS INDEX: 21.29 KG/M2 | HEART RATE: 88 BPM | TEMPERATURE: 99 F | SYSTOLIC BLOOD PRESSURE: 125 MMHG | WEIGHT: 140 LBS | RESPIRATION RATE: 20 BRPM

## 2023-10-05 DIAGNOSIS — E11.65 UNCONTROLLED TYPE 2 DIABETES MELLITUS WITH HYPERGLYCEMIA: Primary | ICD-10-CM

## 2023-10-05 LAB
ALBUMIN SERPL BCP-MCNC: 3.8 G/DL (ref 3.5–5.2)
ALLENS TEST: ABNORMAL
ALP SERPL-CCNC: 121 U/L (ref 55–135)
ALT SERPL W/O P-5'-P-CCNC: 30 U/L (ref 10–44)
ANION GAP SERPL CALC-SCNC: 12 MMOL/L (ref 8–16)
AST SERPL-CCNC: 21 U/L (ref 10–40)
BASOPHILS # BLD AUTO: 0.01 K/UL (ref 0–0.2)
BASOPHILS NFR BLD: 0.2 % (ref 0–1.9)
BILIRUB SERPL-MCNC: 0.3 MG/DL (ref 0.1–1)
BUN SERPL-MCNC: 28 MG/DL (ref 6–20)
CALCIUM SERPL-MCNC: 9.5 MG/DL (ref 8.7–10.5)
CHLORIDE SERPL-SCNC: 97 MMOL/L (ref 95–110)
CO2 SERPL-SCNC: 24 MMOL/L (ref 23–29)
CREAT SERPL-MCNC: 1.3 MG/DL (ref 0.5–1.4)
DIFFERENTIAL METHOD: ABNORMAL
EOSINOPHIL # BLD AUTO: 0.2 K/UL (ref 0–0.5)
EOSINOPHIL NFR BLD: 3.3 % (ref 0–8)
ERYTHROCYTE [DISTWIDTH] IN BLOOD BY AUTOMATED COUNT: 13.4 % (ref 11.5–14.5)
EST. GFR  (NO RACE VARIABLE): >60 ML/MIN/1.73 M^2
GLUCOSE SERPL-MCNC: 448 MG/DL (ref 70–110)
HCO3 UR-SCNC: 31 MMOL/L (ref 24–28)
HCT VFR BLD AUTO: 37.6 % (ref 40–54)
HGB BLD-MCNC: 11.8 G/DL (ref 14–18)
IMM GRANULOCYTES # BLD AUTO: 0.01 K/UL (ref 0–0.04)
IMM GRANULOCYTES NFR BLD AUTO: 0.2 % (ref 0–0.5)
LYMPHOCYTES # BLD AUTO: 1.3 K/UL (ref 1–4.8)
LYMPHOCYTES NFR BLD: 22.3 % (ref 18–48)
MCH RBC QN AUTO: 26.4 PG (ref 27–31)
MCHC RBC AUTO-ENTMCNC: 31.4 G/DL (ref 32–36)
MCV RBC AUTO: 84 FL (ref 82–98)
MONOCYTES # BLD AUTO: 0.6 K/UL (ref 0.3–1)
MONOCYTES NFR BLD: 9.6 % (ref 4–15)
NEUTROPHILS # BLD AUTO: 3.7 K/UL (ref 1.8–7.7)
NEUTROPHILS NFR BLD: 64.4 % (ref 38–73)
NRBC BLD-RTO: 0 /100 WBC
PCO2 BLDA: 57.6 MMHG (ref 35–45)
PH SMN: 7.34 [PH] (ref 7.35–7.45)
PLATELET # BLD AUTO: 272 K/UL (ref 150–450)
PMV BLD AUTO: 9.9 FL (ref 9.2–12.9)
PO2 BLDA: 20 MMHG (ref 40–60)
POC BE: 4 MMOL/L
POC SATURATED O2: 27 % (ref 95–100)
POC TCO2: 33 MMOL/L (ref 24–29)
POCT GLUCOSE: 258 MG/DL (ref 70–110)
POCT GLUCOSE: 405 MG/DL (ref 70–110)
POTASSIUM SERPL-SCNC: 5 MMOL/L (ref 3.5–5.1)
PROT SERPL-MCNC: 8.3 G/DL (ref 6–8.4)
RBC # BLD AUTO: 4.47 M/UL (ref 4.6–6.2)
SAMPLE: ABNORMAL
SITE: ABNORMAL
SODIUM SERPL-SCNC: 133 MMOL/L (ref 136–145)
WBC # BLD AUTO: 5.74 K/UL (ref 3.9–12.7)

## 2023-10-05 PROCEDURE — 96361 HYDRATE IV INFUSION ADD-ON: CPT

## 2023-10-05 PROCEDURE — 82803 BLOOD GASES ANY COMBINATION: CPT

## 2023-10-05 PROCEDURE — 63600175 PHARM REV CODE 636 W HCPCS: Performed by: EMERGENCY MEDICINE

## 2023-10-05 PROCEDURE — 82962 GLUCOSE BLOOD TEST: CPT

## 2023-10-05 PROCEDURE — 99284 EMERGENCY DEPT VISIT MOD MDM: CPT | Mod: 25

## 2023-10-05 PROCEDURE — 80048 BASIC METABOLIC PNL TOTAL CA: CPT | Mod: XB

## 2023-10-05 PROCEDURE — 25000003 PHARM REV CODE 250: Performed by: EMERGENCY MEDICINE

## 2023-10-05 PROCEDURE — 99900035 HC TECH TIME PER 15 MIN (STAT)

## 2023-10-05 PROCEDURE — 80053 COMPREHEN METABOLIC PANEL: CPT | Performed by: EMERGENCY MEDICINE

## 2023-10-05 PROCEDURE — 85025 COMPLETE CBC W/AUTO DIFF WBC: CPT | Performed by: EMERGENCY MEDICINE

## 2023-10-05 PROCEDURE — 96374 THER/PROPH/DIAG INJ IV PUSH: CPT

## 2023-10-05 RX ADMIN — INSULIN HUMAN 10 UNITS: 100 INJECTION, SOLUTION PARENTERAL at 09:10

## 2023-10-05 RX ADMIN — SODIUM CHLORIDE 2000 ML: 9 INJECTION, SOLUTION INTRAVENOUS at 09:10

## 2023-10-05 NOTE — ED TRIAGE NOTES
"Pt to ED via EMS with supposed complaints of SOB and hyperglycemia. Per pt, he has no complaints. States "I have no idea why I'm even here, ask my mom she called the ambulance." Pt denies any chest pain, SOB, n/v/d. Per mom, pt is "tired and slow." Pt did have a CBG of 405. Unsure if compliant with medications. Pt is verbally abusive to staff. NADN.   "

## 2023-10-05 NOTE — ED PROVIDER NOTES
Encounter Date: 10/5/2023    SCRIBE #1 NOTE: I, Lolly Branch, am scribing for, and in the presence of,  Cullen Beauchamp MD. Other sections scribed: HPI, ROS.       History     Chief Complaint   Patient presents with    Shortness of Breath     Patient presesnts to the ED via [facility]  Pt has complaints of SOB and hyperglycemia  Pt Alert at triage       CC: Hyperglycemia    HPI: History is obtained from independent historian. This is a 58 y.o. M who has a PMHx of Acute on chronic pancreatitis, Addiction to drug, CHF, DKA, DM, Hepatitis C virus, HTN, Hx of psychiatric care, Hypomagnesemia, Heroin IVDU, and Psychiatric problem who presents to the ED via EMS transportation for emergent evaluation of hyperglycemia. EMS reported a CBG of 405 en route to the ED. The pt had supposed complaints of SOB. However, when asked if he is experiencing shortness of breath, the pt denies SOB. Pt endorses headache. Pt lives at home with his mother. Pt denies fever, chills, nasal congestion, runny nose, sore throat, ear pain, eye problem, cough, SOB, CP, abdominal pain, nausea, vomiting, diarrhea, dysuria, back pain, arm or leg problems, or rash.    The history is provided by the patient. No  was used.     Review of patient's allergies indicates:  No Known Allergies  Past Medical History:   Diagnosis Date    Acute on chronic pancreatitis 04/03/2016    Addiction to drug     CHF (congestive heart failure)     DKA (diabetic ketoacidoses) 02/2020    DM (diabetes mellitus), type 1     HCV (hepatitis C virus)     high VL     HTN (hypertension)     Hx of psychiatric care     Hypomagnesemia 05/25/2020    IVDU (intravenous drug user)     Heroin    Pancreatitis     Psychiatric problem     Substance abuse      Past Surgical History:   Procedure Laterality Date    CARDIAC SURGERY  1999    [facility]    ESOPHAGOGASTRODUODENOSCOPY N/A 3/5/2020    Gastritis.  No H pylori.  Completed PPI for 1 month.  Procedure:  EGD (ESOPHAGOGASTRODUODENOSCOPY);  Surgeon: Brinda Rene MD;  Location: [facility] ENDO;  Service: Endoscopy;  Laterality: N/A;  W421 A; x5445    LEFT HEART CATHETERIZATION Left 9/28/2020    Procedure: Left heart cath;  Surgeon: Fito Rangel MD;  Location: [facility] CATH LAB;  Service: Cardiology;  Laterality: Left;    SHOULDER SURGERY  2013    right shoulder, spur removal.     Family History   Problem Relation Age of Onset    Asthma Mother      Social History     Tobacco Use    Smoking status: Every Day     Current packs/day: 0.00     Average packs/day: 1 pack/day for 39.2 years (39.2 ttl pk-yrs)     Types: Cigarettes     Start date: 4/3/1981     Last attempt to quit: 6/1/2020     Years since quitting: 3.3    Smokeless tobacco: Never    Tobacco comments:     states approx 1 per day.   Substance Use Topics    Alcohol use: Not Currently     Alcohol/week: 0.0 standard drinks of alcohol    Drug use: Yes     Types: Heroin, IV, Marijuana, Amphetamines     Comment: chronic methamphetamine user     Review of Systems   Constitutional:  Negative for chills and fever.   HENT:  Negative for congestion, ear pain, rhinorrhea and sore throat.    Eyes:  Negative for pain and visual disturbance.   Respiratory:  Negative for cough and shortness of breath.    Cardiovascular:  Negative for chest pain.   Gastrointestinal:  Negative for abdominal pain, diarrhea, nausea and vomiting.   Genitourinary:  Negative for dysuria.   Musculoskeletal:  Negative for back pain.   Skin:  Negative for rash.   Neurological:  Positive for headaches. Negative for weakness.   Hematological:  Does not bruise/bleed easily.       Physical Exam     Initial Vitals   BP Pulse Resp Temp SpO2   10/05/23 0851 10/05/23 0851 10/05/23 0851 10/05/23 0905 10/05/23 0851   112/80 94 20 98.6 °F (37 °C) (!) 94 %      MAP       --                Physical Exam  The patient was examined specifically for the following:   General:No significant distress, Good color, Warm and  dry. Head and neck:Scalp atraumatic, Neck supple. Neurological:Appropriate conversation, Gross motor deficits. Eyes:Conjugate gaze, Clear corneas. ENT: No epistaxis. Cardiac: Regular rate and rhythm, Grossly normal heart tones. Pulmonary: Wheezing, Rales. Gastrointestinal: Abdominal tenderness, Abdominal distention. Musculoskeletal: Extremity deformity, Apparent pain with range of motion of the joints. Skin: Rash.   The findings on examination were normal except for the following:  The lungs are clear.  The heart tones are normal.  The abdomen is soft.  The patient has no complaints and does not want to be touched for physical exam.  ED Course   Procedures  Labs Reviewed   COMPREHENSIVE METABOLIC PANEL - Abnormal; Notable for the following components:       Result Value    Sodium 133 (*)     Glucose 448 (*)     BUN 28 (*)     All other components within normal limits   CBC W/ AUTO DIFFERENTIAL - Abnormal; Notable for the following components:    RBC 4.47 (*)     Hemoglobin 11.8 (*)     Hematocrit 37.6 (*)     MCH 26.4 (*)     MCHC 31.4 (*)     All other components within normal limits   POCT GLUCOSE - Abnormal; Notable for the following components:    POCT Glucose 405 (*)     All other components within normal limits   ISTAT PROCEDURE - Abnormal; Notable for the following components:    POC PH 7.339 (*)     POC PCO2 57.6 (*)     POC PO2 20 (*)     POC HCO3 31.0 (*)     POC TCO2 33 (*)     All other components within normal limits   POCT GLUCOSE - Abnormal; Notable for the following components:    POCT Glucose 258 (*)     All other components within normal limits          Imaging Results    None          Medications   sodium chloride 0.9% bolus 2,000 mL 2,000 mL (0 mLs Intravenous Stopped 10/5/23 1129)   insulin regular injection 10 Units 0.1 mL (10 Units Intravenous Given 10/5/23 4359)     Medical Decision Making  Amount and/or Complexity of Data Reviewed  Labs: ordered.    Risk  OTC drugs.    Given the above, this  patient presents emergency room with no complaints.  There is some history that the patient may have been short of breath earlier in the morning.  I called his mother.  She is concerned that his son is slow.  She complains that he will not work.  His blood sugar today was over 400.  The patient was treated with insulin and fluids.  The glucose is now under 300.  The patient is requesting a sandwich.  Complaints I will discharge to outpatient evaluation and treatment.  There is no evidence of diabetic ketoacidosis.  The patient has a mild anemia with a hemoglobin of 12.  The white blood count is normal.  Infection seems unlikely the sodium is slightly low at 133.  This is not significant.  The BUN is slightly high at 28 this is likely from osmotic diuresis from uncontrolled diabetes.  The glucose is high at 448.  The patient was treated with IV fluids and insulin.  There is a trivial depression in the pH at 7.339.  I doubt diabetic ketoacidosis the anion gap is normal in CO2 is normal.  I will discharge to outpatient evaluation and treatment.     Just prior to discharge, this patient's mother arrived to report that he can not walk.  She has been changing him in his bed.  He is not getting up to use the bathroom.  The patient was able to stand and walk in the emergency room for us.  There was concerned about applying his diabetic glucose monitoring device.  I did not understand it.  The patient is followed by an endocrinologist at [facility].  I refer her back to that office to understand how to use the monitoring device.  The patient treated his mother very poorly during the physical exam.  The patient never mentioned shortness of breath while he was in the emergency room.  He did have hyperglycemia.  This was corrected.        Scribe Attestation:   Scribe #1: I performed the above scribed service and the documentation accurately describes the services I performed. I attest to the accuracy of the note.                       I personally performed the services described in this documentation.  All medical record  entries made by the scribe are at my direction and in my presence.  Signed, Dr. Beauchamp  Clinical Impression:   Final diagnoses:  [E11.65] Uncontrolled type 2 diabetes mellitus with hyperglycemia (Primary)        ED Disposition Condition    Discharge Stable          ED Prescriptions    None       Follow-up Information       Follow up With Specialties Details Why Contact Info    Lawanda Flores, NP Family Medicine In 3 days  [address]  [address]  [place] [address]  [phone]               Cullen Beauchamp MD  10/05/23 7527

## 2023-10-05 NOTE — DISCHARGE INSTRUCTIONS
Please return immediately if you get worse or if new problems develop.  Take your insulin exactly as directed.  Please follow your diet exactly as directed.  Please follow-up with your primary care doctor this week.  Rest.

## 2023-11-05 ENCOUNTER — HOSPITAL ENCOUNTER (EMERGENCY)
Facility: HOSPITAL | Age: 58
Discharge: HOME | End: 2023-11-05
Attending: EMERGENCY MEDICINE
Payer: MEDICARE

## 2023-11-05 VITALS
SYSTOLIC BLOOD PRESSURE: 148 MMHG | TEMPERATURE: 98 F | BODY MASS INDEX: 21.47 KG/M2 | HEART RATE: 98 BPM | HEIGHT: 70 IN | DIASTOLIC BLOOD PRESSURE: 80 MMHG | WEIGHT: 150 LBS | OXYGEN SATURATION: 97 % | RESPIRATION RATE: 20 BRPM

## 2023-11-05 DIAGNOSIS — R29.818 ACUTE FOCAL NEUROLOGICAL DEFICIT: ICD-10-CM

## 2023-11-05 DIAGNOSIS — R40.4 TRANSIENT ALTERATION OF AWARENESS: ICD-10-CM

## 2023-11-05 DIAGNOSIS — R53.1 EPISODE OF GENERALIZED WEAKNESS: Primary | ICD-10-CM

## 2023-11-05 LAB
ALBUMIN SERPL BCP-MCNC: 3.4 G/DL (ref 3.5–5.2)
ALLENS TEST: ABNORMAL
ALLENS TEST: NORMAL
ALP SERPL-CCNC: 110 U/L (ref 55–135)
ALT SERPL W/O P-5'-P-CCNC: 25 U/L (ref 10–44)
ANION GAP SERPL CALC-SCNC: 11 MMOL/L (ref 8–16)
ANION GAP SERPL CALC-SCNC: 8 MMOL/L (ref 8–16)
AST SERPL-CCNC: 22 U/L (ref 10–40)
BASOPHILS # BLD AUTO: 0.01 K/UL (ref 0–0.2)
BASOPHILS NFR BLD: 0.2 % (ref 0–1.9)
BILIRUB SERPL-MCNC: 0.3 MG/DL (ref 0.1–1)
BUN SERPL-MCNC: 19 MG/DL (ref 6–20)
BUN SERPL-MCNC: 23 MG/DL (ref 6–30)
CALCIUM SERPL-MCNC: 9.6 MG/DL (ref 8.7–10.5)
CHLORIDE SERPL-SCNC: 101 MMOL/L (ref 95–110)
CHLORIDE SERPL-SCNC: 96 MMOL/L (ref 95–110)
CHOLEST SERPL-MCNC: 181 MG/DL (ref 120–199)
CHOLEST/HDLC SERPL: 4 {RATIO} (ref 2–5)
CO2 SERPL-SCNC: 27 MMOL/L (ref 23–29)
CREAT SERPL-MCNC: 0.9 MG/DL (ref 0.5–1.4)
CREAT SERPL-MCNC: 1 MG/DL (ref 0.5–1.4)
DIFFERENTIAL METHOD: ABNORMAL
EOSINOPHIL # BLD AUTO: 0.3 K/UL (ref 0–0.5)
EOSINOPHIL NFR BLD: 5.4 % (ref 0–8)
ERYTHROCYTE [DISTWIDTH] IN BLOOD BY AUTOMATED COUNT: 12.7 % (ref 11.5–14.5)
EST. GFR  (NO RACE VARIABLE): >60 ML/MIN/1.73 M^2
ETHANOL SERPL-MCNC: <10 MG/DL
GLUCOSE SERPL-MCNC: 296 MG/DL (ref 70–110)
GLUCOSE SERPL-MCNC: 303 MG/DL (ref 70–110)
HCT VFR BLD AUTO: 36.5 % (ref 40–54)
HCT VFR BLD CALC: 36 %PCV (ref 36–54)
HDLC SERPL-MCNC: 45 MG/DL (ref 40–75)
HDLC SERPL: 24.9 % (ref 20–50)
HGB BLD-MCNC: 11.6 G/DL (ref 14–18)
IMM GRANULOCYTES # BLD AUTO: 0.01 K/UL (ref 0–0.04)
IMM GRANULOCYTES NFR BLD AUTO: 0.2 % (ref 0–0.5)
INR PPP: 1 (ref 0.8–1.2)
LDLC SERPL CALC-MCNC: 98.8 MG/DL (ref 63–159)
LYMPHOCYTES # BLD AUTO: 1.2 K/UL (ref 1–4.8)
LYMPHOCYTES NFR BLD: 20.8 % (ref 18–48)
MCH RBC QN AUTO: 26.1 PG (ref 27–31)
MCHC RBC AUTO-ENTMCNC: 31.8 G/DL (ref 32–36)
MCV RBC AUTO: 82 FL (ref 82–98)
MONOCYTES # BLD AUTO: 0.5 K/UL (ref 0.3–1)
MONOCYTES NFR BLD: 8.4 % (ref 4–15)
NEUTROPHILS # BLD AUTO: 3.7 K/UL (ref 1.8–7.7)
NEUTROPHILS NFR BLD: 65 % (ref 38–73)
NONHDLC SERPL-MCNC: 136 MG/DL
NRBC BLD-RTO: 0 /100 WBC
PLATELET # BLD AUTO: 241 K/UL (ref 150–450)
PMV BLD AUTO: 9.8 FL (ref 9.2–12.9)
POC IONIZED CALCIUM: 1.28 MMOL/L (ref 1.06–1.42)
POC PTINR: 1.2 (ref 0.9–1.2)
POC PTWBT: 14.1 SEC (ref 9.7–14.3)
POC TCO2 (MEASURED): 35 MMOL/L (ref 23–29)
POCT GLUCOSE: 282 MG/DL (ref 70–110)
POTASSIUM BLD-SCNC: 5 MMOL/L (ref 3.5–5.1)
POTASSIUM SERPL-SCNC: 4.9 MMOL/L (ref 3.5–5.1)
PROCALCITONIN SERPL IA-MCNC: 0.03 NG/ML
PROT SERPL-MCNC: 7.7 G/DL (ref 6–8.4)
PROTHROMBIN TIME: 10.3 SEC (ref 9–12.5)
RBC # BLD AUTO: 4.45 M/UL (ref 4.6–6.2)
SAMPLE: ABNORMAL
SAMPLE: NORMAL
SITE: ABNORMAL
SITE: NORMAL
SODIUM BLD-SCNC: 137 MMOL/L (ref 136–145)
SODIUM SERPL-SCNC: 136 MMOL/L (ref 136–145)
T4 FREE SERPL-MCNC: 0.82 NG/DL (ref 0.71–1.51)
TRIGL SERPL-MCNC: 186 MG/DL (ref 30–150)
TSH SERPL DL<=0.005 MIU/L-ACNC: 0.27 UIU/ML (ref 0.4–4)
WBC # BLD AUTO: 5.71 K/UL (ref 3.9–12.7)

## 2023-11-05 PROCEDURE — 80053 COMPREHEN METABOLIC PANEL: CPT | Performed by: EMERGENCY MEDICINE

## 2023-11-05 PROCEDURE — 84295 ASSAY OF SERUM SODIUM: CPT | Mod: 91

## 2023-11-05 PROCEDURE — 82330 ASSAY OF CALCIUM: CPT

## 2023-11-05 PROCEDURE — 84132 ASSAY OF SERUM POTASSIUM: CPT | Mod: 91

## 2023-11-05 PROCEDURE — 82077 ASSAY SPEC XCP UR&BREATH IA: CPT | Performed by: EMERGENCY MEDICINE

## 2023-11-05 PROCEDURE — 85014 HEMATOCRIT: CPT

## 2023-11-05 PROCEDURE — 80061 LIPID PANEL: CPT | Performed by: EMERGENCY MEDICINE

## 2023-11-05 PROCEDURE — 82962 GLUCOSE BLOOD TEST: CPT

## 2023-11-05 PROCEDURE — 80047 BASIC METABLC PNL IONIZED CA: CPT

## 2023-11-05 PROCEDURE — 99285 EMERGENCY DEPT VISIT HI MDM: CPT | Mod: 25

## 2023-11-05 PROCEDURE — 85025 COMPLETE CBC W/AUTO DIFF WBC: CPT | Performed by: EMERGENCY MEDICINE

## 2023-11-05 PROCEDURE — 85610 PROTHROMBIN TIME: CPT | Performed by: EMERGENCY MEDICINE

## 2023-11-05 PROCEDURE — 99900035 HC TECH TIME PER 15 MIN (STAT)

## 2023-11-05 PROCEDURE — 84439 ASSAY OF FREE THYROXINE: CPT | Performed by: EMERGENCY MEDICINE

## 2023-11-05 PROCEDURE — 99448 PR INTERPROF, PHONE/INTERNET/EHR, CONSULT, 21-30 MINS: ICD-10-PCS | Mod: ,,, | Performed by: PSYCHIATRY & NEUROLOGY

## 2023-11-05 PROCEDURE — 99448 NTRPROF PH1/NTRNET/EHR 21-30: CPT | Mod: ,,, | Performed by: PSYCHIATRY & NEUROLOGY

## 2023-11-05 PROCEDURE — 93005 ELECTROCARDIOGRAM TRACING: CPT

## 2023-11-05 PROCEDURE — 93010 ELECTROCARDIOGRAM REPORT: CPT | Mod: ,,, | Performed by: INTERNAL MEDICINE

## 2023-11-05 PROCEDURE — 85610 PROTHROMBIN TIME: CPT

## 2023-11-05 PROCEDURE — 84145 PROCALCITONIN (PCT): CPT | Performed by: EMERGENCY MEDICINE

## 2023-11-05 PROCEDURE — 84443 ASSAY THYROID STIM HORMONE: CPT | Performed by: EMERGENCY MEDICINE

## 2023-11-05 PROCEDURE — 93010 EKG 12-LEAD: ICD-10-PCS | Mod: ,,, | Performed by: INTERNAL MEDICINE

## 2023-11-05 PROCEDURE — 82565 ASSAY OF CREATININE: CPT | Mod: 91

## 2023-11-05 NOTE — CONSULTS
Telestroke Brief Note    Patient presenting with generalized weakness for 2 days.  Possible L facial droop noted by EMS.  Patient is somnolent but irritable on exam.  Will not smile but no facial droop appreciated.  CTH without acute findings.  Low suspicion for stroke at this time.    Discussed with Dr. Beauchamp at [facility].       Total time spent on encounter including chart review, discussing of imaging, triage & evaluation and planning for diagnostics and management ~ 10-15 minutes, >50% of this time was spent communicating with consulting provider.    Radha Morton MD  Vascular Neurology

## 2023-11-05 NOTE — ED NOTES
Pt advised of all risk by dr. Em, pt and sister verbalized understanding of risks and immediate return precautions.  AMA formed signed.

## 2023-11-05 NOTE — ED NOTES
Left sided facial droop noted while on ems stretcher, per ems facial droop was not noted before.  Dr. Beauchamp at bedside

## 2023-11-05 NOTE — ED NOTES
[facility] notified of the Stroke alert, spoke with Yoni.  Dr. Morton notified at 10:41 and spoke with Dr. Beauchamp.

## 2023-11-05 NOTE — DISCHARGE INSTRUCTIONS
Return immediately if he gets worse or if new problems develop.  Please have him follow up with his primary care doctor this week.  Return at any time for further evaluation.

## 2023-11-05 NOTE — ED PROVIDER NOTES
Encounter Date: 11/5/2023       History     Chief Complaint   Patient presents with    Altered Mental Status     SENT by family for AMS and generalized weakness from home.  Pt alert to self only.  Denies pain. Cbg 294, upon rolling pt to the room a L facial droop noted and MD called to bs, code stroke activated       HPI  This 58-year-old insulin-dependent diabetic presents emergency room with generalized weakness.  His blood sugar was 294 at home.  The patient was treated with insulin.  The patient did walk to the EMS stretcher on the scene.  There is a history of remote head trauma, possibly 3 days ago in the emergency room.  The patient has frequent falls.  He also has a history of IV drug abuse.  Review of patient's allergies indicates:  No Known Allergies  Past Medical History:   Diagnosis Date    Acute on chronic pancreatitis 04/03/2016    Addiction to drug     CHF (congestive heart failure)     DKA (diabetic ketoacidoses) 02/2020    DM (diabetes mellitus), type 1     HCV (hepatitis C virus)     high VL     HTN (hypertension)     Hx of psychiatric care     Hypomagnesemia 05/25/2020    IVDU (intravenous drug user)     Heroin    Pancreatitis     Psychiatric problem     Substance abuse      Past Surgical History:   Procedure Laterality Date    CARDIAC SURGERY  1999    stent placed. ([facility])    ESOPHAGOGASTRODUODENOSCOPY N/A 3/5/2020    Gastritis.  No H pylori.  Completed PPI for 1 month.  Procedure: EGD (ESOPHAGOGASTRODUODENOSCOPY);  Surgeon: Brinda Rene MD;  Location: [facility] ENDO;  Service: Endoscopy;  Laterality: N/A;  W421 A; x5445    LEFT HEART CATHETERIZATION Left 9/28/2020    Procedure: Left heart cath;  Surgeon: Fito Rangel MD;  Location: [facility] CATH LAB;  Service: Cardiology;  Laterality: Left;    SHOULDER SURGERY  2013    right shoulder, spur removal.     Family History   Problem Relation Age of Onset    Asthma Mother      Social History     Tobacco Use    Smoking status: Every Day      Current packs/day: 0.00     Average packs/day: 1 pack/day for 39.2 years (39.2 ttl pk-yrs)     Types: Cigarettes     Start date: 4/3/1981     Last attempt to quit: 6/1/2020     Years since quitting: 3.4    Smokeless tobacco: Never    Tobacco comments:     states approx 1 per day.   Substance Use Topics    Alcohol use: Not Currently     Alcohol/week: 0.0 standard drinks of alcohol    Drug use: Yes     Types: Heroin, IV, Marijuana, Amphetamines     Comment: chronic methamphetamine user     Review of Systems  The patient was questioned specifically with regard to the following.  General: Fever, chills, sweats. Neuro: Headache. Eyes: eye problems. ENT: Ear pain, sore throat. Cardiovascular: Chest pain. Respiratory: Cough, shortness of breath. Gastrointestinal: Abdominal pain, vomiting, diarrhea. Genitourinary: Painful urination.  Musculoskeletal: Arm and leg problems. Skin: Rash.  The review of systems was negative except for the following:  Left-sided facial droop, generalized weakness, history of fall.  Physical Exam     Initial Vitals [11/05/23 1030]   BP Pulse Resp Temp SpO2   127/75 93 12 97.7 °F (36.5 °C) 96 %      MAP       --         Physical Exam  The patient was examined specifically for the following:   General:No significant distress, Good color, Warm and dry. Head and neck:Scalp atraumatic, Neck supple. Neurological:Appropriate conversation, Gross motor deficits. Eyes:Conjugate gaze, Clear corneas. ENT: No epistaxis. Cardiac: Regular rate and rhythm, Grossly normal heart tones. Pulmonary: Wheezing, Rales. Gastrointestinal: Abdominal tenderness, Abdominal distention. Musculoskeletal: Extremity deformity, Apparent pain with range of motion of the joints. Skin: Rash.   The findings on examination were normal except for the following:  The patient has left-sided facial droop.  The lungs are clear.  The heart tones are normal.  The patient is conversational.  He is a little inattentive and uncooperative.  Good  motor movement in all 4 extremities.   ED Course   Procedures  Labs Reviewed   CBC W/ AUTO DIFFERENTIAL - Abnormal; Notable for the following components:       Result Value    RBC 4.45 (*)     Hemoglobin 11.6 (*)     Hematocrit 36.5 (*)     MCH 26.1 (*)     MCHC 31.8 (*)     All other components within normal limits   COMPREHENSIVE METABOLIC PANEL - Abnormal; Notable for the following components:    Glucose 303 (*)     Albumin 3.4 (*)     All other components within normal limits   TSH - Abnormal; Notable for the following components:    TSH 0.265 (*)     All other components within normal limits   LIPID PANEL - Abnormal; Notable for the following components:    Triglycerides 186 (*)     All other components within normal limits   POCT GLUCOSE - Abnormal; Notable for the following components:    POCT Glucose 282 (*)     All other components within normal limits   ISTAT PROCEDURE - Abnormal; Notable for the following components:    POC Glucose 296 (*)     POC TCO2 (MEASURED) 35 (*)     All other components within normal limits   PROTIME-INR   PROCALCITONIN   ALCOHOL,MEDICAL (ETHANOL)   T4, FREE   ISTAT PROCEDURE          Imaging Results              CT Head Without Contrast (Final result)  Result time 11/05/23 11:03:16      Final result by Fredi Rios MD (11/05/23 11:03:16)                   Impression:      No definite acute intracranial findings by noncontrast CT.    Chronic findings, as above.      Electronically signed by: Fredi Rios  Date:    11/05/2023  Time:    11:03               Narrative:    EXAMINATION:  CT HEAD WITHOUT CONTRAST    CLINICAL HISTORY:  Neuro deficit, acute, stroke suspected;    TECHNIQUE:  Low dose axial images were obtained through the head.  Coronal and sagittal reformations were also performed. Contrast was not administered.    COMPARISON:  Head CT 08/27/2023.    FINDINGS:  Blood: No acute intracranial hemorrhage.    Parenchyma: No definite loss of gray-white differentiation  to suggest acute or subacute transcortical infarct. Old right PCA territory infarct again noted in the medial right occipital lobe.  Remote left cerebellar infarct.  Additional nonspecific areas of white matter hypoattenuation may relate to sequela of chronic small vessel ischemic disease.  Question remote lacunar type infarct in the [place].    Age advanced parenchymal volume loss, with notable atrophy of the mesial temporal lobe structures and brainstem.  Prominent extra-axial CSF called to the cerebellum may reflect ex vacuo prominence due to cerebellar atrophy versus kinsey cisterna magna or arachnoid cyst.    Ventricles/Extra-axial spaces: Similar size and configuration of ventricles when compared to 08/27/2023.  Basal cisterns remain patent.    Vessels: Moderate to heavy atherosclerotic calcification.    Orbits: Unremarkable.    Scalp: Unremarkable.    Skull: There are no depressed skull fractures or destructive bone lesions.    Sinuses and mastoids: Scattered minor paranasal sinus mucosal thickening with retention cysts.    Other findings: None                                       Medications - No data to display  Medical Decision Making  Amount and/or Complexity of Data Reviewed  Labs: ordered.  Radiology: ordered.    The above, this patient presented to the emergency room with generalized weakness, altered mental status concern for stroke.  It looked like the patient had a left facial droop on arrival.  It occurred in the emergency room.  It resolved.  The patient was seen and evaluated by Dr. Morton, vascular neurology, who did not note neurologic deficits..  The CT of the head was negative.  Further recommendations were not made by Neurology.  Is unremarkable.  We did not obtain a urine.  The patient refuses urine he is here with a sister.  He does not want to stay in the emergency room.  I will accept their AMA.  The sister signed on his behalf.  He has no complaints at discharge.  Stroke, drug intoxication,  hypoglycemia, seizure were all considered.  The patient's mentation improved throughout his visit in the emergency room.  At 1:01 p.m. the patient was acting his normal self.                           Clinical Impression:   Final diagnoses:  [R29.818] Acute focal neurological deficit  [R53.1] Episode of generalized weakness (Primary)  [R40.4] Transient alteration of awareness        ED Disposition Condition    AMA Stable                Cullen Beauchamp MD  11/05/23 1257       Cullen Beauchamp MD  11/05/23 1258       Cullen Beauchamp MD  11/05/23 1542

## 2023-11-05 NOTE — ED NOTES
Pt sister at bedside and screaming that he wants to leave, pt took off all monitoring wires.  IV removed and md notifed.

## 2023-11-20 PROBLEM — I63.9 STROKE: Status: RESOLVED | Noted: 2023-08-16 | Resolved: 2023-11-20

## 2023-12-03 ENCOUNTER — HOSPITAL ENCOUNTER (EMERGENCY)
Facility: HOSPITAL | Age: 58
Discharge: HOME | End: 2023-12-04
Attending: STUDENT IN AN ORGANIZED HEALTH CARE EDUCATION/TRAINING PROGRAM
Payer: MEDICARE

## 2023-12-03 DIAGNOSIS — R00.0 TACHYCARDIA: ICD-10-CM
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
## 2023-12-03 LAB — POCT GLUCOSE: 157 MG/DL (ref 70–110)

## 2023-12-03 PROCEDURE — 93010 ELECTROCARDIOGRAM REPORT: CPT | Mod: ,,, | Performed by: INTERNAL MEDICINE

## 2023-12-03 PROCEDURE — 93010 EKG 12-LEAD: ICD-10-PCS | Mod: ,,, | Performed by: INTERNAL MEDICINE

## 2023-12-03 PROCEDURE — 82962 GLUCOSE BLOOD TEST: CPT

## 2023-12-03 PROCEDURE — 93005 ELECTROCARDIOGRAM TRACING: CPT

## 2023-12-03 PROCEDURE — 99285 EMERGENCY DEPT VISIT HI MDM: CPT

## 2023-12-04 VITALS
WEIGHT: 150 LBS | OXYGEN SATURATION: 95 % | BODY MASS INDEX: 21.47 KG/M2 | RESPIRATION RATE: 20 BRPM | HEART RATE: 101 BPM | SYSTOLIC BLOOD PRESSURE: 153 MMHG | HEIGHT: 70 IN | DIASTOLIC BLOOD PRESSURE: 75 MMHG | TEMPERATURE: 98 F

## 2023-12-04 NOTE — ED TRIAGE NOTES
Pt arrived to ED via EMS with a c/o fall which was witnessed by his mother. Pt had fall while getting to his wheelchair. Had past Hx of stroke. Denies any complain on presentation to ED. Pt is alert, has slurred speech. BP is elevated, other vital signs are within normal limit.

## 2023-12-04 NOTE — DISCHARGE INSTRUCTIONS
Thank you for coming to our Emergency Department today. It is important to remember that some problems are difficult to diagnose and may not be found during your first visit. Be sure to follow up with your primary care doctor and review any labs/imaging that was performed with them. If you do not have a primary care doctor, you may contact the one listed on your discharge paperwork or you may also call the [facility] Appointment Desk at [phone] to schedule an appointment with one.     All medications may potentially have side effects and it is impossible to predict which medications may give you side effects. If you feel that you are having a negative effect of any medication you should immediately stop taking them and seek medical attention.    Return to the ER with any questions/concerns, new/concerning symptoms, worsening or failure to improve. Do not drive or make any important decisions for 24 hours if you have received any pain medications, sedatives or mood altering drugs during your ER visit.

## 2023-12-04 NOTE — ED PROVIDER NOTES
Encounter Date: 12/3/2023       History     Chief Complaint   Patient presents with    Fall     BIB EMS for pain all over and reports fall while transferring to his wheel chair. Witnessed fall by mother.      58 y.o.male who has a past medical history of chronic pancreatitis, CHF (congestive heart failure), DM (diabetes mellitus), HCV (hepatitis C virus), HTN (hypertension), and IVDU (intravenous drug user) to emergency department after mechanical fall per EMS mother reported patient was attempting to transfer from wheelchair fell onto the ground unknown if he syncopized.    Spoke with with patient he denies syncope although he endorsed generalized pain with EMS he is declining and with me.  Patient states he just wants to fall asleep.  Point of care glucose EN route with .        Review of patient's allergies indicates:  No Known Allergies  Past Medical History:   Diagnosis Date    Acute on chronic pancreatitis 04/03/2016    Addiction to drug     CHF (congestive heart failure)     DKA (diabetic ketoacidoses) 02/2020    DM (diabetes mellitus), type 1     HCV (hepatitis C virus)     high VL     HTN (hypertension)     Hx of psychiatric care     Hypomagnesemia 05/25/2020    IVDU (intravenous drug user)     Heroin    Pancreatitis     Psychiatric problem     Stroke     Substance abuse      Past Surgical History:   Procedure Laterality Date    CARDIAC SURGERY  1999    stent placed. ([facility])    ESOPHAGOGASTRODUODENOSCOPY N/A 3/5/2020    Gastritis.  No H pylori.  Completed PPI for 1 month.  Procedure: EGD (ESOPHAGOGASTRODUODENOSCOPY);  Surgeon: Bridna Rene MD;  Location: [facility] ENDO;  Service: Endoscopy;  Laterality: N/A;  W421 A; [phone]    LEFT HEART CATHETERIZATION Left 9/28/2020    Procedure: Left heart cath;  Surgeon: Fito Rangel MD;  Location: [facility] CATH LAB;  Service: Cardiology;  Laterality: Left;    SHOULDER SURGERY  2013    right shoulder, spur removal.     Family History   Problem  Relation Age of Onset    Asthma Mother      Social History     Tobacco Use    Smoking status: Former     Current packs/day: 0.00     Average packs/day: 1 pack/day for 39.2 years (39.2 ttl pk-yrs)     Types: Cigarettes     Start date: 4/3/1981     Quit date: 6/1/2020     Years since quitting: 3.5    Smokeless tobacco: Never    Tobacco comments:     states approx 1 per day.   Substance Use Topics    Alcohol use: Not Currently     Alcohol/week: 0.0 standard drinks of alcohol    Drug use: Yes     Types: Heroin, IV, Marijuana, Amphetamines     Comment: chronic methamphetamine user     Review of Systems   Unable to perform ROS: Acuity of condition       Physical Exam     Initial Vitals [12/03/23 2319]   BP Pulse Resp Temp SpO2   (!) 160/80 108 20 100.1 °F (37.8 °C) 99 %      MAP       --         Physical Exam    Constitutional: He is sleeping. He is easily aroused.   HENT:   Head: Normocephalic and atraumatic.   Eyes: Conjunctivae are normal.   Neck: Neck supple.   Normal range of motion.  Cardiovascular:    Tachycardia present.         Abdominal: Abdomen is soft. Bowel sounds are normal. He exhibits no distension. There is no abdominal tenderness.   Musculoskeletal:      Cervical back: Normal range of motion and neck supple.     Neurological: He is easily aroused.   Skin: Capillary refill takes less than 2 seconds.   Scattered abrasions that are healing in bilateral lower extremities.         ED Course   Procedures  Labs Reviewed   POCT GLUCOSE - Abnormal; Notable for the following components:       Result Value    POCT Glucose 157 (*)     All other components within normal limits   POCT GLUCOSE MONITORING CONTINUOUS     EKG Readings: (Independently Interpreted)   Independent Interpretation of EKG:  Rhythm: Sinus   Rate: 101  QTC: 477  No STEMI        Imaging Results              CT Cervical Spine Without Contrast (Final result)  Result time 12/04/23 00:48:24      Final result by Linh Gordon MD (12/04/23 00:48:24)                    Impression:      No evidence of acute cervical spine fracture or dislocation.      Electronically signed by: Linh Gordon MD  Date:    12/04/2023  Time:    00:48               Narrative:    EXAMINATION:  CT CERVICAL SPINE WITHOUT CONTRAST    CLINICAL HISTORY:  Polytrauma, blunt;    TECHNIQUE:  Low dose axial images, sagittal and coronal reformations were performed though the cervical spine.  Contrast was not administered.    COMPARISON:  CT cervical spine from 08/02/2023.    FINDINGS:  No evidence of acute cervical spine fracture or dislocation.  Odontoid process is intact.  Craniocervical junction is unremarkable.  Cervical spine alignment is within normal limits.    Surrounding soft tissues show no significant abnormalities.  Airway is patent.  Mild emphysematous changes are seen in the lung apices.                                       CT Head Without Contrast (Final result)  Result time 12/04/23 00:46:33      Final result by Linh Gordon MD (12/04/23 00:46:33)                   Impression:      No acute intracranial abnormalities identified.  Stable enlargement of the ventricular system.  No significant detrimental change compared to previous CT head from 11/05/2023.      Electronically signed by: Linh Gordon MD  Date:    12/04/2023  Time:    00:46               Narrative:    EXAMINATION:  CT HEAD WITHOUT CONTRAST    CLINICAL HISTORY:  Polytrauma, blunt;    TECHNIQUE:  Low dose axial images were obtained through the head.  Coronal and sagittal reformations were also performed. Contrast was not administered.    COMPARISON:  CT head from 11/05/2023.    FINDINGS:  There is stable ventricular enlargement.  Small remote infarctions are seen within the right occipital lobe and left cerebellar hemisphere.  There is mild chronic microvascular ischemic disease.  No evidence of acute/recent major vascular distribution cerebral infarction or intracranial hemorrhage.  The ventricular system is  normal in size and configuration with no evidence of hydrocephalus. No effacement of the skull-base cisterns.  Mild left sphenoid sinus disease.  Remaining visualized paranasal sinuses and mastoid air cells are essentially clear.  The calvarium shows no significant abnormality.                                       Medications - No data to display  Medical Decision Making:   Initial Assessment:   58 y.o.male who has a past medical history of chronic pancreatitis, CHF (congestive heart failure), DM (diabetes mellitus), HCV (hepatitis C virus), HTN (hypertension), and IVDU (intravenous drug user) to emergency department after mechanical fall per EMS mother reported patient was attempting to transfer from wheelchair fell onto the ground unknown if he syncopized.  Patient in no acute distress no signs of head trauma on exam.  Given fall was mechanical will obtain CT head and cervical spine to assess for any acute intracranial abnormalities.  Patient moving all his extremities.  Old healing abrasions noted to bilateral extremities no deformities noted and pulses intact in the upper and lower extremities.  Point of care glucose normal so low suspicion for DKA or hypoglycemia.  EKG without any acute arrhythmia or STEMI.    Differential Diagnosis:   Differential Diagnosis includes, but is not limited to:  Polytrauma, fall/syncope, traumatic SAH/intracranial bleed, skull/c-spine/facial fracture, concussion, neck injury.  Clinical Tests:   Lab Tests: Ordered and Reviewed  Radiological Study: Ordered and Reviewed  Medical Tests: Ordered and Reviewed             ED Course as of 12/04/23 0133   Mon Dec 04, 2023   0109 CT Cervical Spine Without Contrast [AS]   0109 CT Head Without Contrast  CT head and cervical spine without any acute abnormalities [AS]   0115 Pt is currently stable for discharge. I see no indication of an emergent process beyond that addressed during our encounter but have duly counseled the patient/family  regarding the need for prompt follow-up as well as the indications that should prompt immediate return to the emergency room should new or worrisome developments occur. I discussed the ED work up and diagnostic findings with the patient/family. The patient/family has been provided with verbal and printed direction regarding our final diagnosis(es) as well as instructions regarding use of OTC and/or Rx medications intended to manage the patient's aforementioned conditions. The patient/family verbalized an understanding. The patient/family is asked if there are any questions or concerns. We discuss the case, until all issues are addressed to the patient/family's satisfaction. Patient/family understands and is agreeable to the plan.  [AS]      ED Course User Index  [AS] Jg Temple MD          Medical Decision Making  Amount and/or Complexity of Data Reviewed  Radiology: ordered. Decision-making details documented in ED Course.           Clinical Impression:   Final diagnoses:  [R00.0] Tachycardia  [W19.XXXA] Fall, initial encounter (Primary)          ED Disposition Condition    Discharge Stable          ED Prescriptions    None       Follow-up Information       Follow up With Specialties Details Why Contact Info    Lawanda Flores, NP Family Medicine Schedule an appointment as soon as possible for a visit  for reassesment [address] [facility]  [address]  [facility] [id number] [phone]      [facility] Emergency Dept Emergency Medicine  If symptoms worsen [address] [facility] [place] [address] [phone]            DISCLAIMER: This note was prepared with [employer] voice recognition transcription software. Garbled syntax, mangled pronouns, and other bizarre constructions may be attributed to that software system.     Jg Temple MD  12/04/23 0133

## 2023-12-10 ENCOUNTER — HOSPITAL ENCOUNTER (INPATIENT)
Facility: HOSPITAL | Age: 58
LOS: 14 days | Discharge: HOME HEALTH | DRG: 637 | End: 2023-12-24
Attending: EMERGENCY MEDICINE | Admitting: HOSPITALIST
Payer: MEDICARE

## 2023-12-10 DIAGNOSIS — F31.70 BIPOLAR DISORDER IN PARTIAL REMISSION, MOST RECENT EPISODE UNSPECIFIED TYPE: ICD-10-CM

## 2023-12-10 DIAGNOSIS — R80.9 TYPE 1 DIABETES MELLITUS WITH MICROALBUMINURIA: ICD-10-CM

## 2023-12-10 DIAGNOSIS — E10.29 TYPE 1 DIABETES MELLITUS WITH MICROALBUMINURIA: ICD-10-CM

## 2023-12-10 DIAGNOSIS — R53.81 DEBILITY: ICD-10-CM

## 2023-12-10 DIAGNOSIS — I21.4 NSTEMI (NON-ST ELEVATED MYOCARDIAL INFARCTION): ICD-10-CM

## 2023-12-10 DIAGNOSIS — E11.10 DIABETIC KETOACIDOSIS WITHOUT COMA: Primary | ICD-10-CM

## 2023-12-10 DIAGNOSIS — R73.9 HYPERGLYCEMIA: ICD-10-CM

## 2023-12-10 PROBLEM — R46.89 AGGRESSION: Status: RESOLVED | Noted: 2023-01-28 | Resolved: 2023-12-10

## 2023-12-10 LAB
ALBUMIN SERPL BCP-MCNC: 3.4 G/DL (ref 3.5–5.2)
ALLENS TEST: ABNORMAL
ALP SERPL-CCNC: 136 U/L (ref 55–135)
ALT SERPL W/O P-5'-P-CCNC: 33 U/L (ref 10–44)
AMPHET+METHAMPHET UR QL: NEGATIVE
ANION GAP SERPL CALC-SCNC: 19 MMOL/L (ref 8–16)
ANION GAP SERPL CALC-SCNC: 20 MMOL/L (ref 8–16)
AST SERPL-CCNC: 15 U/L (ref 10–40)
B-OH-BUTYR BLD STRIP-SCNC: 3.2 MMOL/L (ref 0–0.5)
BACTERIA #/AREA URNS HPF: ABNORMAL /HPF
BARBITURATES UR QL SCN>200 NG/ML: NEGATIVE
BASOPHILS # BLD AUTO: 0.04 K/UL (ref 0–0.2)
BASOPHILS NFR BLD: 0.5 % (ref 0–1.9)
BENZODIAZ UR QL SCN>200 NG/ML: NEGATIVE
BILIRUB SERPL-MCNC: 0.4 MG/DL (ref 0.1–1)
BILIRUB UR QL STRIP: NEGATIVE
BUN SERPL-MCNC: 29 MG/DL (ref 6–20)
BUN SERPL-MCNC: 34 MG/DL (ref 6–20)
BZE UR QL SCN: NEGATIVE
CALCIUM SERPL-MCNC: 9.7 MG/DL (ref 8.7–10.5)
CALCIUM SERPL-MCNC: 9.9 MG/DL (ref 8.7–10.5)
CANNABINOIDS UR QL SCN: NEGATIVE
CHLORIDE SERPL-SCNC: 100 MMOL/L (ref 95–110)
CHLORIDE SERPL-SCNC: 95 MMOL/L (ref 95–110)
CLARITY UR: CLEAR
CO2 SERPL-SCNC: 19 MMOL/L (ref 23–29)
CO2 SERPL-SCNC: 20 MMOL/L (ref 23–29)
COLOR UR: COLORLESS
CREAT SERPL-MCNC: 1.5 MG/DL (ref 0.5–1.4)
CREAT SERPL-MCNC: 1.7 MG/DL (ref 0.5–1.4)
CREAT UR-MCNC: 14.2 MG/DL (ref 23–375)
DELSYS: ABNORMAL
DIFFERENTIAL METHOD: ABNORMAL
EOSINOPHIL # BLD AUTO: 0.1 K/UL (ref 0–0.5)
EOSINOPHIL NFR BLD: 0.9 % (ref 0–8)
ERYTHROCYTE [DISTWIDTH] IN BLOOD BY AUTOMATED COUNT: 13.4 % (ref 11.5–14.5)
ERYTHROCYTE [SEDIMENTATION RATE] IN BLOOD BY WESTERGREN METHOD: 24 MM/H
EST. GFR  (NO RACE VARIABLE): 46 ML/MIN/1.73 M^2
EST. GFR  (NO RACE VARIABLE): 54 ML/MIN/1.73 M^2
FIO2: 21
GLUCOSE SERPL-MCNC: 568 MG/DL (ref 70–110)
GLUCOSE SERPL-MCNC: 774 MG/DL (ref 70–110)
GLUCOSE UR QL STRIP: ABNORMAL
HCO3 UR-SCNC: 27.5 MMOL/L (ref 24–28)
HCT VFR BLD AUTO: 39.1 % (ref 40–54)
HGB BLD-MCNC: 12.1 G/DL (ref 14–18)
HGB UR QL STRIP: ABNORMAL
IMM GRANULOCYTES # BLD AUTO: 0.17 K/UL (ref 0–0.04)
IMM GRANULOCYTES NFR BLD AUTO: 2 % (ref 0–0.5)
KETONES UR QL STRIP: ABNORMAL
LEUKOCYTE ESTERASE UR QL STRIP: ABNORMAL
LIPASE SERPL-CCNC: 13 U/L (ref 4–60)
LYMPHOCYTES # BLD AUTO: 1.1 K/UL (ref 1–4.8)
LYMPHOCYTES NFR BLD: 12.6 % (ref 18–48)
MAGNESIUM SERPL-MCNC: 2.4 MG/DL (ref 1.6–2.6)
MAGNESIUM SERPL-MCNC: 2.5 MG/DL (ref 1.6–2.6)
MCH RBC QN AUTO: 25.1 PG (ref 27–31)
MCHC RBC AUTO-ENTMCNC: 30.9 G/DL (ref 32–36)
MCV RBC AUTO: 81 FL (ref 82–98)
METHADONE UR QL SCN>300 NG/ML: NEGATIVE
MICROSCOPIC COMMENT: ABNORMAL
MODE: ABNORMAL
MONOCYTES # BLD AUTO: 1.3 K/UL (ref 0.3–1)
MONOCYTES NFR BLD: 14.4 % (ref 4–15)
NEUTROPHILS # BLD AUTO: 6.1 K/UL (ref 1.8–7.7)
NEUTROPHILS NFR BLD: 69.6 % (ref 38–73)
NITRITE UR QL STRIP: NEGATIVE
NRBC BLD-RTO: 0 /100 WBC
OPIATES UR QL SCN: NEGATIVE
PCO2 BLDA: 55 MMHG (ref 35–45)
PCP UR QL SCN>25 NG/ML: NEGATIVE
PH SMN: 7.31 [PH] (ref 7.35–7.45)
PH UR STRIP: 6 [PH] (ref 5–8)
PHOSPHATE SERPL-MCNC: 3.8 MG/DL (ref 2.7–4.5)
PHOSPHATE SERPL-MCNC: 4.4 MG/DL (ref 2.7–4.5)
PLATELET # BLD AUTO: 375 K/UL (ref 150–450)
PMV BLD AUTO: 9.5 FL (ref 9.2–12.9)
PO2 BLDA: 24 MMHG (ref 40–60)
POC BE: 0 MMOL/L
POC SATURATED O2: 37 % (ref 95–100)
POC TCO2: 29 MMOL/L (ref 24–29)
POCT GLUCOSE: 308 MG/DL (ref 70–110)
POCT GLUCOSE: 374 MG/DL (ref 70–110)
POCT GLUCOSE: 408 MG/DL (ref 70–110)
POCT GLUCOSE: >500 MG/DL (ref 70–110)
POCT GLUCOSE: >500 MG/DL (ref 70–110)
POTASSIUM SERPL-SCNC: 5.2 MMOL/L (ref 3.5–5.1)
POTASSIUM SERPL-SCNC: 5.8 MMOL/L (ref 3.5–5.1)
PROT SERPL-MCNC: 8.7 G/DL (ref 6–8.4)
PROT UR QL STRIP: ABNORMAL
RBC # BLD AUTO: 4.83 M/UL (ref 4.6–6.2)
RBC #/AREA URNS HPF: 1 /HPF (ref 0–4)
SAMPLE: ABNORMAL
SITE: ABNORMAL
SODIUM SERPL-SCNC: 134 MMOL/L (ref 136–145)
SODIUM SERPL-SCNC: 139 MMOL/L (ref 136–145)
SP GR UR STRIP: 1.02 (ref 1–1.03)
TOXICOLOGY INFORMATION: ABNORMAL
URN SPEC COLLECT METH UR: ABNORMAL
UROBILINOGEN UR STRIP-ACNC: NEGATIVE EU/DL
WBC # BLD AUTO: 8.7 K/UL (ref 3.9–12.7)
WBC #/AREA URNS HPF: 12 /HPF (ref 0–5)
WBC CLUMPS URNS QL MICRO: ABNORMAL
YEAST URNS QL MICRO: ABNORMAL

## 2023-12-10 PROCEDURE — 93010 EKG 12-LEAD: ICD-10-PCS | Mod: ,,, | Performed by: INTERNAL MEDICINE

## 2023-12-10 PROCEDURE — 25000003 PHARM REV CODE 250: Performed by: EMERGENCY MEDICINE

## 2023-12-10 PROCEDURE — 87077 CULTURE AEROBIC IDENTIFY: CPT | Performed by: HOSPITALIST

## 2023-12-10 PROCEDURE — 93005 ELECTROCARDIOGRAM TRACING: CPT

## 2023-12-10 PROCEDURE — 99285 EMERGENCY DEPT VISIT HI MDM: CPT | Mod: 25

## 2023-12-10 PROCEDURE — 83690 ASSAY OF LIPASE: CPT | Performed by: NURSE PRACTITIONER

## 2023-12-10 PROCEDURE — 81000 URINALYSIS NONAUTO W/SCOPE: CPT | Mod: 59 | Performed by: HOSPITALIST

## 2023-12-10 PROCEDURE — 12000002 HC ACUTE/MED SURGE SEMI-PRIVATE ROOM

## 2023-12-10 PROCEDURE — 87086 URINE CULTURE/COLONY COUNT: CPT | Performed by: HOSPITALIST

## 2023-12-10 PROCEDURE — 80048 BASIC METABOLIC PNL TOTAL CA: CPT | Performed by: STUDENT IN AN ORGANIZED HEALTH CARE EDUCATION/TRAINING PROGRAM

## 2023-12-10 PROCEDURE — 87186 SC STD MICRODIL/AGAR DIL: CPT | Performed by: HOSPITALIST

## 2023-12-10 PROCEDURE — 82010 KETONE BODYS QUAN: CPT | Performed by: NURSE PRACTITIONER

## 2023-12-10 PROCEDURE — 84100 ASSAY OF PHOSPHORUS: CPT | Performed by: STUDENT IN AN ORGANIZED HEALTH CARE EDUCATION/TRAINING PROGRAM

## 2023-12-10 PROCEDURE — 99900035 HC TECH TIME PER 15 MIN (STAT)

## 2023-12-10 PROCEDURE — 80053 COMPREHEN METABOLIC PANEL: CPT | Performed by: NURSE PRACTITIONER

## 2023-12-10 PROCEDURE — 84100 ASSAY OF PHOSPHORUS: CPT | Mod: 91 | Performed by: NURSE PRACTITIONER

## 2023-12-10 PROCEDURE — 25000003 PHARM REV CODE 250: Performed by: STUDENT IN AN ORGANIZED HEALTH CARE EDUCATION/TRAINING PROGRAM

## 2023-12-10 PROCEDURE — 85025 COMPLETE CBC W/AUTO DIFF WBC: CPT | Performed by: NURSE PRACTITIONER

## 2023-12-10 PROCEDURE — 96360 HYDRATION IV INFUSION INIT: CPT

## 2023-12-10 PROCEDURE — 82803 BLOOD GASES ANY COMBINATION: CPT

## 2023-12-10 PROCEDURE — 80307 DRUG TEST PRSMV CHEM ANLYZR: CPT | Performed by: HOSPITALIST

## 2023-12-10 PROCEDURE — 83735 ASSAY OF MAGNESIUM: CPT | Mod: 91 | Performed by: NURSE PRACTITIONER

## 2023-12-10 PROCEDURE — 83036 HEMOGLOBIN GLYCOSYLATED A1C: CPT | Performed by: NURSE PRACTITIONER

## 2023-12-10 PROCEDURE — 93010 ELECTROCARDIOGRAM REPORT: CPT | Mod: ,,, | Performed by: INTERNAL MEDICINE

## 2023-12-10 PROCEDURE — 83735 ASSAY OF MAGNESIUM: CPT | Performed by: STUDENT IN AN ORGANIZED HEALTH CARE EDUCATION/TRAINING PROGRAM

## 2023-12-10 PROCEDURE — 25000003 PHARM REV CODE 250: Performed by: NURSE PRACTITIONER

## 2023-12-10 PROCEDURE — 87088 URINE BACTERIA CULTURE: CPT | Performed by: HOSPITALIST

## 2023-12-10 RX ORDER — ATORVASTATIN CALCIUM 10 MG/1
20 TABLET, FILM COATED ORAL DAILY
Status: DISCONTINUED | OUTPATIENT
Start: 2023-12-11 | End: 2023-12-24 | Stop reason: HOSPADM

## 2023-12-10 RX ORDER — ONDANSETRON 2 MG/ML
4 INJECTION INTRAMUSCULAR; INTRAVENOUS EVERY 6 HOURS PRN
Status: DISCONTINUED | OUTPATIENT
Start: 2023-12-10 | End: 2023-12-24 | Stop reason: HOSPADM

## 2023-12-10 RX ORDER — NAPROXEN SODIUM 220 MG/1
81 TABLET, FILM COATED ORAL DAILY
Status: DISCONTINUED | OUTPATIENT
Start: 2023-12-11 | End: 2023-12-24 | Stop reason: HOSPADM

## 2023-12-10 RX ORDER — DEXTROSE MONOHYDRATE AND SODIUM CHLORIDE 5; .45 G/100ML; G/100ML
INJECTION, SOLUTION INTRAVENOUS CONTINUOUS
Status: DISCONTINUED | OUTPATIENT
Start: 2023-12-10 | End: 2023-12-11

## 2023-12-10 RX ORDER — SODIUM CHLORIDE 9 MG/ML
INJECTION, SOLUTION INTRAVENOUS CONTINUOUS
Status: DISCONTINUED | OUTPATIENT
Start: 2023-12-10 | End: 2023-12-10

## 2023-12-10 RX ORDER — CLOPIDOGREL BISULFATE 75 MG/1
75 TABLET ORAL DAILY
Status: DISCONTINUED | OUTPATIENT
Start: 2023-12-11 | End: 2023-12-24 | Stop reason: HOSPADM

## 2023-12-10 RX ORDER — METOPROLOL SUCCINATE 25 MG/1
25 TABLET, EXTENDED RELEASE ORAL DAILY
Status: DISCONTINUED | OUTPATIENT
Start: 2023-12-11 | End: 2023-12-24 | Stop reason: HOSPADM

## 2023-12-10 RX ORDER — CARBAMAZEPINE 100 MG/1
200 TABLET, EXTENDED RELEASE ORAL DAILY
Status: DISCONTINUED | OUTPATIENT
Start: 2023-12-11 | End: 2023-12-24 | Stop reason: HOSPADM

## 2023-12-10 RX ORDER — RISPERIDONE 1 MG/1
1 TABLET ORAL 2 TIMES DAILY
Status: DISCONTINUED | OUTPATIENT
Start: 2023-12-10 | End: 2023-12-24 | Stop reason: HOSPADM

## 2023-12-10 RX ORDER — DEXTROSE MONOHYDRATE AND SODIUM CHLORIDE 5; .45 G/100ML; G/100ML
INJECTION, SOLUTION INTRAVENOUS CONTINUOUS PRN
Status: DISCONTINUED | OUTPATIENT
Start: 2023-12-10 | End: 2023-12-11

## 2023-12-10 RX ORDER — CARBAMAZEPINE 100 MG/1
400 TABLET, EXTENDED RELEASE ORAL NIGHTLY
Status: DISCONTINUED | OUTPATIENT
Start: 2023-12-10 | End: 2023-12-24 | Stop reason: HOSPADM

## 2023-12-10 RX ORDER — ACETAMINOPHEN 325 MG/1
650 TABLET ORAL EVERY 4 HOURS PRN
Status: DISCONTINUED | OUTPATIENT
Start: 2023-12-10 | End: 2023-12-24 | Stop reason: HOSPADM

## 2023-12-10 RX ORDER — SODIUM CHLORIDE 0.9 % (FLUSH) 0.9 %
10 SYRINGE (ML) INJECTION
Status: DISCONTINUED | OUTPATIENT
Start: 2023-12-10 | End: 2023-12-11

## 2023-12-10 RX ORDER — SODIUM CHLORIDE 9 MG/ML
INJECTION, SOLUTION INTRAVENOUS CONTINUOUS
Status: DISCONTINUED | OUTPATIENT
Start: 2023-12-10 | End: 2023-12-11

## 2023-12-10 RX ORDER — FLASH GLUCOSE SENSOR
KIT MISCELLANEOUS
COMMUNITY
Start: 2023-11-18

## 2023-12-10 RX ORDER — PEN NEEDLE, DIABETIC 32GX 5/32"
NEEDLE, DISPOSABLE MISCELLANEOUS 4 TIMES DAILY
COMMUNITY
Start: 2023-11-24

## 2023-12-10 RX ORDER — VENLAFAXINE HYDROCHLORIDE 75 MG/1
75 CAPSULE, EXTENDED RELEASE ORAL DAILY
Status: DISCONTINUED | OUTPATIENT
Start: 2023-12-11 | End: 2023-12-24 | Stop reason: HOSPADM

## 2023-12-10 RX ORDER — SODIUM CHLORIDE 9 MG/ML
1000 INJECTION, SOLUTION INTRAVENOUS CONTINUOUS
Status: DISCONTINUED | OUTPATIENT
Start: 2023-12-10 | End: 2023-12-10

## 2023-12-10 RX ADMIN — SODIUM CHLORIDE: 9 INJECTION, SOLUTION INTRAVENOUS at 07:12

## 2023-12-10 RX ADMIN — INSULIN HUMAN 0.1 UNITS/KG/HR: 1 INJECTION, SOLUTION INTRAVENOUS at 05:12

## 2023-12-10 RX ADMIN — INSULIN DETEMIR 17 UNITS: 100 INJECTION, SOLUTION SUBCUTANEOUS at 08:12

## 2023-12-10 RX ADMIN — SODIUM CHLORIDE 1000 ML: 9 INJECTION, SOLUTION INTRAVENOUS at 04:12

## 2023-12-10 NOTE — ADMISSIONCARE
AdmissionCare    Guideline: Diabetes - INPT, Inpatient    Based on the indications selected for the patient, the bed status of Admit to Inpatient was determined to be MET    The following indications were selected as present at the time of evaluation of the patient:      Diabetic ketoacidosis that requires inpatient management, as indicated by ALL of the following:   -     - Hyperglycemia (eg, plasma glucose greater than 200 mg/dL (11.10 mmol/L))    - Ketonuria or ketonemia (eg, serum beta hydroxybutyrate greater than 3 mmol/L, or moderate to large ketonuria)    - Acidosis (eg, arterial or venous pH less than 7.30, or serum bicarbonate less than 15 mEq/L (mmol/L))    Inpatient management appropriate, as indicated by 1 or more of the following:    -      - Inpatient treatment of underlying etiology needed (eg, infection)    AdmissionCare documentation entered by: Celia Ontiveros    [facility], 27th edition, Copyright © 2023 [facility], [facility] All Rights Reserved.  [id number]:16:[id number]:00

## 2023-12-10 NOTE — ED NOTES
"Pt to ER by EMS with reports of weakness and fatigue x few days. Per ems family stated by slid out of chair and was to weak to get back up. CBG reading high. Pt reports has not taken medications x "days". IV x 2 started and labs drawn and sent to lab.   "

## 2023-12-10 NOTE — ED NOTES
"Pt laying in bed saturated in urine. When asked if pt continent of urine pt stated " yes". Asked pt if I could assist in cleaning him and getting him out of his wet close pt stated " yes". Pt noted to have on 2 pullups and 1 brief all completely saturated in urine. Soiled items removed and pt cleaned. Pt noted to have several areas of skin breakdown on scrotum. Pt placed on clean bed sheets and dry absorbant pads. Pt stated " thank you". Will continue to monitor   "

## 2023-12-10 NOTE — ED PROVIDER NOTES
Encounter Date: 12/10/2023    SCRIBE #1 NOTE: I, Saulo Hunt Do, am scribing for, and in the presence of,  Jaycob Rueda MD. I have scribed the following portions of the note - Other sections scribed: HPI, ROS.       History     Chief Complaint   Patient presents with    Fatigue     PT BIB [facility] TODAY FOR WEAKNESS AND FATIGUE. PT SLID OUT OF A CHAIR AND WAS UNABLE TO GET UP. CBG HIGH.      Cali Mabry is a 58 y.o. male, with a past medical history of CHF, T1DM, HTN, and CVA, who presents to the ED via EMS with generalized weakness onset 2 days ago. Per chart review, patient was seen at [facility] on 11/05 for generalized weakness. Per chart review, patient was seen at [facility] on 11/07 for generalized weakness. The patient left AMA on both ED visits. Patient notes left hand trauma s/p falling out a chair. Denies head trauma and LOC. He endorses associated nausea, vomiting, diarrhea, general abdominal pain, fatigue, and visual disturbance. Patient notes compliance with insulin lispro and insulin detemir U-100. No medication PTA. No other exacerbating or alleviating factors. Patient denies fever, chills, headache, or other associated symptoms.     The history is provided by the patient. No  was used.     Review of patient's allergies indicates:  No Known Allergies  Past Medical History:   Diagnosis Date    Acute on chronic pancreatitis 04/03/2016    Addiction to drug     CHF (congestive heart failure)     DKA (diabetic ketoacidoses) 02/2020    DM (diabetes mellitus), type 1     HCV (hepatitis C virus)     high VL     HTN (hypertension)     Hx of psychiatric care     Hypomagnesemia 05/25/2020    IVDU (intravenous drug user)     Heroin    Pancreatitis     Psychiatric problem     Stroke     Substance abuse      Past Surgical History:   Procedure Laterality Date    CARDIAC SURGERY  1999    stent placed. ([facility])    ESOPHAGOGASTRODUODENOSCOPY N/A 3/5/2020    Gastritis.  No H pylori.   Completed PPI for 1 month.  Procedure: EGD (ESOPHAGOGASTRODUODENOSCOPY);  Surgeon: Brinda Rene MD;  Location: [facility] ENDO;  Service: Endoscopy;  Laterality: N/A;  W421 A; [id number]    LEFT HEART CATHETERIZATION Left 9/28/2020    Procedure: Left heart cath;  Surgeon: Fito Rangel MD;  Location: [facility] CATH LAB;  Service: Cardiology;  Laterality: Left;    SHOULDER SURGERY  2013    right shoulder, spur removal.     Family History   Problem Relation Age of Onset    Asthma Mother      Social History     Tobacco Use    Smoking status: Former     Current packs/day: 0.00     Average packs/day: 1 pack/day for 39.2 years (39.2 ttl pk-yrs)     Types: Cigarettes     Start date: 4/3/1981     Quit date: 6/1/2020     Years since quitting: 3.5    Smokeless tobacco: Never    Tobacco comments:     states approx 1 per day.   Substance Use Topics    Alcohol use: Not Currently     Alcohol/week: 0.0 standard drinks of alcohol    Drug use: Yes     Types: Heroin, IV, Marijuana, Amphetamines     Comment: chronic methamphetamine user     Review of Systems   Constitutional:  Positive for fatigue. Negative for chills and fever.   HENT: Negative.     Eyes:  Positive for visual disturbance.   Respiratory: Negative.     Cardiovascular: Negative.    Gastrointestinal:  Positive for abdominal pain (Generalized), diarrhea, nausea and vomiting.   Genitourinary: Negative.    Musculoskeletal: Negative.    Skin: Negative.    Neurological:  Positive for weakness. Negative for headaches.       Physical Exam     Initial Vitals [12/10/23 1557]   BP Pulse Resp Temp SpO2   (!) 142/86 96 18 97.9 °F (36.6 °C) 98 %      MAP       --         Physical Exam    Nursing note and vitals reviewed.  Constitutional: He appears well-developed. He is not diaphoretic. He appears distressed (moderately).   HENT:   Head: Normocephalic and atraumatic.   Nose: Nose normal.   Mouth/Throat: No oropharyngeal exudate.   Eyes: EOM are normal. Pupils are equal, round, and reactive  to light.   Neck: Neck supple. No tracheal deviation present. No JVD present.   Normal range of motion.  Cardiovascular:  Regular rhythm, normal heart sounds and intact distal pulses.           Tachycardic   Pulmonary/Chest: Breath sounds normal. No respiratory distress. He has no wheezes. He has no rhonchi. He has no rales.   Abdominal: Abdomen is soft. Bowel sounds are normal. He exhibits no distension. There is no abdominal tenderness. There is no rebound and no guarding.   Musculoskeletal:         General: No tenderness or edema. Normal range of motion.      Cervical back: Normal range of motion and neck supple.     Neurological: He has normal strength.   Drowsy appearing, oriented to self, place, situation, moving all extremities grossly   Skin: Skin is warm and dry. Capillary refill takes less than 2 seconds. No rash noted. No erythema.         ED Course   Critical Care    Date/Time: 12/10/2023 4:45 PM    Performed by: Jaycob Rueda MD  Authorized by: Jaycob Rueda MD  Direct patient critical care time: 15 minutes  Additional history critical care time: 10 minutes  Ordering / reviewing critical care time: 5 minutes  Documentation critical care time: 5 minutes  Total critical care time (exclusive of procedural time) : 35 minutes  Critical care time was exclusive of separately billable procedures and treating other patients and teaching time.  Critical care was necessary to treat or prevent imminent or life-threatening deterioration of the following conditions: circulatory failure and endocrine crisis.  Critical care was time spent personally by me on the following activities: development of treatment plan with patient or surrogate, discussions with consultants, evaluation of patient's response to treatment, examination of patient, obtaining history from patient or surrogate, ordering and performing treatments and interventions, ordering and review of laboratory studies, ordering and review of  radiographic studies, re-evaluation of patient's condition and review of old charts.        Labs Reviewed   CBC W/ AUTO DIFFERENTIAL - Abnormal; Notable for the following components:       Result Value    Hemoglobin 12.1 (*)     Hematocrit 39.1 (*)     MCV 81 (*)     MCH 25.1 (*)     MCHC 30.9 (*)     Immature Granulocytes 2.0 (*)     Immature Grans (Abs) 0.17 (*)     Mono # 1.3 (*)     Lymph % 12.6 (*)     All other components within normal limits   COMPREHENSIVE METABOLIC PANEL - Abnormal; Notable for the following components:    Sodium 134 (*)     Potassium 5.8 (*)     CO2 19 (*)     Glucose 774 (*)     BUN 34 (*)     Creatinine 1.7 (*)     Total Protein 8.7 (*)     Albumin 3.4 (*)     Alkaline Phosphatase 136 (*)     eGFR 46 (*)     Anion Gap 20 (*)     All other components within normal limits    Narrative:       Glucose critical result(s) called and verbal readback obtained from   Teja Jaquez by JOHNNY 12/10/2023 17:01   BETA - HYDROXYBUTYRATE, SERUM - Abnormal; Notable for the following components:    Beta-Hydroxybutyrate 3.2 (*)     All other components within normal limits   URINALYSIS, REFLEX TO URINE CULTURE - Abnormal; Notable for the following components:    Color, UA Colorless (*)     Protein, UA Trace (*)     Glucose, UA 4+ (*)     Ketones, UA 1+ (*)     Occult Blood UA Trace (*)     Leukocytes, UA Trace (*)     All other components within normal limits    Narrative:     Specimen Source->Urine   URINALYSIS MICROSCOPIC - Abnormal; Notable for the following components:    WBC, UA 12 (*)     All other components within normal limits    Narrative:     Specimen Source->Urine   BASIC METABOLIC PANEL - Abnormal; Notable for the following components:    Potassium 5.2 (*)     CO2 20 (*)     Glucose 568 (*)     BUN 29 (*)     Creatinine 1.5 (*)     Anion Gap 19 (*)     eGFR 54 (*)     All other components within normal limits    Narrative:      Glucose critical result(s) called and verbal readback obtained from    Edie Guillory by [facility] 12/10/2023 20:23   DRUG SCREEN PANEL, URINE EMERGENCY - Abnormal; Notable for the following components:    Creatinine, Urine 14.2 (*)     All other components within normal limits    Narrative:     Specimen Source->Urine   PHOSPHORUS - Abnormal; Notable for the following components:    Phosphorus 2.6 (*)     All other components within normal limits   BASIC METABOLIC PANEL - Abnormal; Notable for the following components:    Glucose 289 (*)     BUN 30 (*)     All other components within normal limits   ISTAT PROCEDURE - Abnormal; Notable for the following components:    POC PH 7.308 (*)     POC PCO2 55.0 (*)     POC PO2 24 (*)     All other components within normal limits   POCT GLUCOSE - Abnormal; Notable for the following components:    POCT Glucose >500 (*)     All other components within normal limits   POCT GLUCOSE - Abnormal; Notable for the following components:    POCT Glucose >500 (*)     All other components within normal limits   POCT GLUCOSE - Abnormal; Notable for the following components:    POCT Glucose 408 (*)     All other components within normal limits   POCT GLUCOSE - Abnormal; Notable for the following components:    POCT Glucose 374 (*)     All other components within normal limits   POCT GLUCOSE - Abnormal; Notable for the following components:    POCT Glucose 308 (*)     All other components within normal limits   POCT GLUCOSE - Abnormal; Notable for the following components:    POCT Glucose 245 (*)     All other components within normal limits   POCT GLUCOSE - Abnormal; Notable for the following components:    POCT Glucose 184 (*)     All other components within normal limits   LIPASE   LIPASE   MAGNESIUM   PHOSPHORUS   PHOSPHORUS   MAGNESIUM   DRUG SCREEN PANEL, URINE EMERGENCY   MAGNESIUM   HEMOGLOBIN A1C   POCT GLUCOSE MONITORING CONTINUOUS          Imaging Results              X-Ray Chest 1 View (Final result)  Result time 12/11/23 01:11:38      Final result by  Khris Anna MD (12/11/23 01:11:38)                   Impression:      Right-sided PICC line noted, distal tip at the expected location of the cavoatrial junction, otherwise stable radiographic appearance.      Electronically signed by: Khris Anna  Date:    12/11/2023  Time:    01:11               Narrative:    EXAMINATION:  XR CHEST 1 VIEW    CLINICAL HISTORY:  PICC placement;    TECHNIQUE:  Single frontal view of the chest was performed.    COMPARISON:  Chest radiograph December 10, 2023    FINDINGS:  Single chest view is submitted.  There is interval placement of a right-sided PICC line, distal tip at the expected location of the cavoatrial junction.    When accounting for position and technique and depth of inspiration, the appearance of the cardiomediastinal silhouette and pulmonary bronchovascular markings is stable.  Mild atelectatic change noted.    The osseous structures appear intact.                                       CT Head Without Contrast (Final result)  Result time 12/10/23 17:05:16      Final result by Chapito Walker MD (12/10/23 17:05:16)                   Impression:      Persistent ventriculomegaly.  Correlate for normal pressure hydrocephalus.    Stable area of encephalomalacia involving the occipital pole on the right.    No acute hemorrhage, mass or infarction.      Electronically signed by: Chapito Walker  Date:    12/10/2023  Time:    17:05               Narrative:    EXAMINATION:  CT HEAD WITHOUT CONTRAST    CLINICAL HISTORY:  Mental status change, unknown cause;    TECHNIQUE:  Low dose axial CT images obtained throughout the head without intravenous contrast. Sagittal and coronal reconstructions were performed.    COMPARISON:  None.    FINDINGS:  Intracranial compartment:    Ventricles and sulci are stable in size with prominence of the lateral ventricular system communicating to the 4th ventricle and cisterna magna.  No extra-axial blood or fluid collections.    The  brain parenchyma appears stable with low-density in encephalomalacia in the right occipital lobe again noted..  No new parenchymal mass, hemorrhage, edema or major vascular distribution infarct.    Skull/extracranial contents (limited evaluation): No fracture. Mastoid air cells and paranasal sinuses are essentially clear.                                       X-Ray Chest AP Portable (Final result)  Result time 12/10/23 16:42:03      Final result by Dianne Cantu MD (12/10/23 16:42:03)                   Impression:      No acute abnormality.      Electronically signed by: Dianne Cantu MD  Date:    12/10/2023  Time:    16:42               Narrative:    EXAMINATION:  XR CHEST AP PORTABLE    CLINICAL HISTORY:  hyperglycemia;    TECHNIQUE:  Single frontal view of the chest was performed.    COMPARISON:  07/22/2023    FINDINGS:  The lungs are clear with normal appearance of pulmonary vasculature. No pleural effusion. No evident pneumothorax.    The cardiac silhouette is normal in size. The hilar and mediastinal contours are unremarkable.    Bones are intact.                                       Medications   aspirin chewable tablet 81 mg (81 mg Oral Given 12/11/23 0826)   atorvastatin tablet 20 mg (20 mg Oral Given 12/11/23 0826)   carBAMazepine 12 hr tablet 200 mg (200 mg Oral Given 12/11/23 0826)   carBAMazepine 12 hr tablet 400 mg (400 mg Oral Not Given 12/10/23 2100)   clopidogreL tablet 75 mg (75 mg Oral Given 12/11/23 0826)   metoprolol succinate (TOPROL-XL) 24 hr tablet 25 mg (25 mg Oral Given 12/11/23 0826)   risperiDONE tablet 1 mg (1 mg Oral Given 12/11/23 0826)   venlafaxine 24 hr capsule 75 mg (75 mg Oral Given 12/11/23 0826)   ondansetron injection 4 mg (has no administration in time range)   acetaminophen tablet 650 mg (has no administration in time range)   sodium chloride 0.9% flush 10 mL (10 mLs Intravenous Not Given 12/11/23 1200)     And   sodium chloride 0.9% flush 10 mL (has no administration  in time range)   insulin aspart U-100 pen 1-10 Units (has no administration in time range)   insulin aspart U-100 pen 4 Units (0 Units Subcutaneous Hold 12/11/23 1130)   insulin detemir U-100 (Levemir) pen 15 Units (15 Units Subcutaneous Given 12/11/23 0223)   0.9%  NaCl infusion ( Intravenous New Bag 12/11/23 1133)   losartan tablet 25 mg (25 mg Oral Given 12/11/23 0826)   mupirocin 2 % ointment ( Nasal Not Given 12/11/23 1145)   sodium chloride 0.9% bolus 1,000 mL 1,000 mL (0 mLs Intravenous Stopped 12/10/23 1724)   insulin detemir U-100 (Levemir) pen 17 Units (17 Units Subcutaneous Given 12/10/23 2005)   potassium chloride 20 mEq in 100 mL IVPB (FOR CENTRAL LINE ADMINISTRATION ONLY) (20 mEq Intravenous New Bag 12/11/23 0619)     Medical Decision Making  Amount and/or Complexity of Data Reviewed  Radiology: ordered.    Risk  Prescription drug management.  Decision regarding hospitalization.      MDM:    58-year-old male with past medical history as noted above presenting with fatigue.  Physical exam as noted above.  ED workup notable for CBC with white blood cell count 8.70, hemoglobin 12.1, CMP with potassium 5.8, glucose 774, bicarb 19, creatinine 1.7, beta hydroxybutyrate 3.2, anion gap 20, lipase 13, CT head shows no additional acute finding, chest x-rays unremarkable, pH 7.30.  Patient presentation concerning for DKA with lab work as noted above suspected secondary to noncompliance.  After initial IV fluid bolus, insulin drip fusion, and improvement in glucose patient's mentation appears to be improving, is intermittently agitated.  Patient has previously left AMA multiple times, given this concern home long-acting dose of Levemir was given in case patient attempted to leave AMA at this time.  Discussed further with ICU, will continue inpatient evaluation and management.      Note was created using voice recognition software. Note may have occasional typographical or grammatical errors, garbled syntax, and  other bizarre constructions that may not have been identified and edited despite good david initial review prior to signing.             Scribe Attestation:   Scribe #1: I performed the above scribed service and the documentation accurately describes the services I performed. I attest to the accuracy of the note.                               Clinical Impression:  Final diagnoses:  [R73.9] Hyperglycemia       I, Jaycob Rueda M.D., personally performed the services described in this documentation.  All medical record entries made by the scribe were at my direction and in my presence.  I have reviewed the chart and agree that the record reflects my personal performance and is accurate and complete.     ED Disposition Condition    Admit                 Jaycob Rueda MD  12/11/23 [id number]

## 2023-12-11 LAB
ANION GAP SERPL CALC-SCNC: 12 MMOL/L (ref 8–16)
BUN SERPL-MCNC: 30 MG/DL (ref 6–20)
CALCIUM SERPL-MCNC: 9.7 MG/DL (ref 8.7–10.5)
CHLORIDE SERPL-SCNC: 105 MMOL/L (ref 95–110)
CO2 SERPL-SCNC: 27 MMOL/L (ref 23–29)
CREAT SERPL-MCNC: 1.2 MG/DL (ref 0.5–1.4)
EST. GFR  (NO RACE VARIABLE): >60 ML/MIN/1.73 M^2
ESTIMATED AVG GLUCOSE: 278 MG/DL (ref 68–131)
GLUCOSE SERPL-MCNC: 289 MG/DL (ref 70–110)
HBA1C MFR BLD: 11.3 % (ref 4–5.6)
MAGNESIUM SERPL-MCNC: 2.4 MG/DL (ref 1.6–2.6)
PHOSPHATE SERPL-MCNC: 2.6 MG/DL (ref 2.7–4.5)
POCT GLUCOSE: 106 MG/DL (ref 70–110)
POCT GLUCOSE: 142 MG/DL (ref 70–110)
POCT GLUCOSE: 165 MG/DL (ref 70–110)
POCT GLUCOSE: 184 MG/DL (ref 70–110)
POCT GLUCOSE: 245 MG/DL (ref 70–110)
POCT GLUCOSE: 54 MG/DL (ref 70–110)
POCT GLUCOSE: 85 MG/DL (ref 70–110)
POTASSIUM SERPL-SCNC: 3.7 MMOL/L (ref 3.5–5.1)
SODIUM SERPL-SCNC: 144 MMOL/L (ref 136–145)

## 2023-12-11 PROCEDURE — S5010 5% DEXTROSE AND 0.45% SALINE: HCPCS | Performed by: STUDENT IN AN ORGANIZED HEALTH CARE EDUCATION/TRAINING PROGRAM

## 2023-12-11 PROCEDURE — 80048 BASIC METABOLIC PNL TOTAL CA: CPT | Performed by: STUDENT IN AN ORGANIZED HEALTH CARE EDUCATION/TRAINING PROGRAM

## 2023-12-11 PROCEDURE — G0426 INPT/ED TELECONSULT50: HCPCS | Mod: GT,,, | Performed by: PSYCHIATRY & NEUROLOGY

## 2023-12-11 PROCEDURE — 63600175 PHARM REV CODE 636 W HCPCS: Performed by: STUDENT IN AN ORGANIZED HEALTH CARE EDUCATION/TRAINING PROGRAM

## 2023-12-11 PROCEDURE — 97165 OT EVAL LOW COMPLEX 30 MIN: CPT

## 2023-12-11 PROCEDURE — 25000003 PHARM REV CODE 250: Performed by: STUDENT IN AN ORGANIZED HEALTH CARE EDUCATION/TRAINING PROGRAM

## 2023-12-11 PROCEDURE — 25000003 PHARM REV CODE 250: Performed by: FAMILY MEDICINE

## 2023-12-11 PROCEDURE — 83735 ASSAY OF MAGNESIUM: CPT | Performed by: STUDENT IN AN ORGANIZED HEALTH CARE EDUCATION/TRAINING PROGRAM

## 2023-12-11 PROCEDURE — 84100 ASSAY OF PHOSPHORUS: CPT | Performed by: STUDENT IN AN ORGANIZED HEALTH CARE EDUCATION/TRAINING PROGRAM

## 2023-12-11 PROCEDURE — G0426 PR INPT TELEHEALTH CONSULT 50M: ICD-10-PCS | Mod: GT,,, | Performed by: PSYCHIATRY & NEUROLOGY

## 2023-12-11 PROCEDURE — 25000003 PHARM REV CODE 250: Performed by: HOSPITALIST

## 2023-12-11 PROCEDURE — 97161 PT EVAL LOW COMPLEX 20 MIN: CPT

## 2023-12-11 PROCEDURE — 36569 INSJ PICC 5 YR+ W/O IMAGING: CPT

## 2023-12-11 PROCEDURE — 11000001 HC ACUTE MED/SURG PRIVATE ROOM

## 2023-12-11 PROCEDURE — A4216 STERILE WATER/SALINE, 10 ML: HCPCS | Performed by: HOSPITALIST

## 2023-12-11 PROCEDURE — C1751 CATH, INF, PER/CENT/MIDLINE: HCPCS

## 2023-12-11 RX ORDER — SODIUM CHLORIDE 0.9 % (FLUSH) 0.9 %
10 SYRINGE (ML) INJECTION
Status: DISCONTINUED | OUTPATIENT
Start: 2023-12-11 | End: 2023-12-23

## 2023-12-11 RX ORDER — INSULIN ASPART 100 [IU]/ML
1-10 INJECTION, SOLUTION INTRAVENOUS; SUBCUTANEOUS
Status: DISCONTINUED | OUTPATIENT
Start: 2023-12-11 | End: 2023-12-17

## 2023-12-11 RX ORDER — POTASSIUM CHLORIDE 14.9 MG/ML
20 INJECTION INTRAVENOUS
Status: COMPLETED | OUTPATIENT
Start: 2023-12-11 | End: 2023-12-11

## 2023-12-11 RX ORDER — MUPIROCIN 20 MG/G
OINTMENT TOPICAL 2 TIMES DAILY
Status: DISPENSED | OUTPATIENT
Start: 2023-12-11 | End: 2023-12-16

## 2023-12-11 RX ORDER — INSULIN ASPART 100 [IU]/ML
4 INJECTION, SOLUTION INTRAVENOUS; SUBCUTANEOUS
Status: DISCONTINUED | OUTPATIENT
Start: 2023-12-11 | End: 2023-12-13

## 2023-12-11 RX ORDER — SODIUM CHLORIDE 9 MG/ML
INJECTION, SOLUTION INTRAVENOUS CONTINUOUS
Status: ACTIVE | OUTPATIENT
Start: 2023-12-11 | End: 2023-12-11

## 2023-12-11 RX ORDER — LOSARTAN POTASSIUM 25 MG/1
25 TABLET ORAL DAILY
Status: DISCONTINUED | OUTPATIENT
Start: 2023-12-11 | End: 2023-12-21

## 2023-12-11 RX ORDER — SODIUM CHLORIDE 0.9 % (FLUSH) 0.9 %
10 SYRINGE (ML) INJECTION EVERY 6 HOURS
Status: DISCONTINUED | OUTPATIENT
Start: 2023-12-11 | End: 2023-12-23

## 2023-12-11 RX ADMIN — POTASSIUM CHLORIDE 20 MEQ: 14.9 INJECTION, SOLUTION INTRAVENOUS at 04:12

## 2023-12-11 RX ADMIN — CLOPIDOGREL BISULFATE 75 MG: 75 TABLET ORAL at 08:12

## 2023-12-11 RX ADMIN — Medication 10 ML: at 06:12

## 2023-12-11 RX ADMIN — MUPIROCIN: 20 OINTMENT TOPICAL at 08:12

## 2023-12-11 RX ADMIN — INSULIN ASPART 4 UNITS: 100 INJECTION, SOLUTION INTRAVENOUS; SUBCUTANEOUS at 08:12

## 2023-12-11 RX ADMIN — CARBAMAZEPINE 400 MG: 100 TABLET, EXTENDED RELEASE ORAL at 08:12

## 2023-12-11 RX ADMIN — SODIUM CHLORIDE: 9 INJECTION, SOLUTION INTRAVENOUS at 02:12

## 2023-12-11 RX ADMIN — LOSARTAN POTASSIUM 25 MG: 25 TABLET, FILM COATED ORAL at 08:12

## 2023-12-11 RX ADMIN — DEXTROSE AND SODIUM CHLORIDE: 5; 450 INJECTION, SOLUTION INTRAVENOUS at 01:12

## 2023-12-11 RX ADMIN — ASPIRIN 81 MG CHEWABLE TABLET 81 MG: 81 TABLET CHEWABLE at 08:12

## 2023-12-11 RX ADMIN — ATORVASTATIN CALCIUM 20 MG: 10 TABLET, FILM COATED ORAL at 08:12

## 2023-12-11 RX ADMIN — POTASSIUM CHLORIDE 20 MEQ: 14.9 INJECTION, SOLUTION INTRAVENOUS at 06:12

## 2023-12-11 RX ADMIN — SODIUM CHLORIDE: 9 INJECTION, SOLUTION INTRAVENOUS at 11:12

## 2023-12-11 RX ADMIN — METOPROLOL SUCCINATE ER TABLETS 25 MG: 25 TABLET, FILM COATED, EXTENDED RELEASE ORAL at 08:12

## 2023-12-11 RX ADMIN — SODIUM CHLORIDE: 9 INJECTION, SOLUTION INTRAVENOUS at 04:12

## 2023-12-11 RX ADMIN — INSULIN ASPART 4 UNITS: 100 INJECTION, SOLUTION INTRAVENOUS; SUBCUTANEOUS at 04:12

## 2023-12-11 RX ADMIN — RISPERIDONE 1 MG: 1 TABLET ORAL at 08:12

## 2023-12-11 RX ADMIN — SODIUM CHLORIDE: 9 INJECTION, SOLUTION INTRAVENOUS at 06:12

## 2023-12-11 RX ADMIN — INSULIN DETEMIR 15 UNITS: 100 INJECTION, SOLUTION SUBCUTANEOUS at 02:12

## 2023-12-11 RX ADMIN — VENLAFAXINE HYDROCHLORIDE 75 MG: 75 CAPSULE, EXTENDED RELEASE ORAL at 08:12

## 2023-12-11 RX ADMIN — POTASSIUM CHLORIDE 20 MEQ: 14.9 INJECTION, SOLUTION INTRAVENOUS at 02:12

## 2023-12-11 RX ADMIN — CARBAMAZEPINE 200 MG: 100 TABLET, EXTENDED RELEASE ORAL at 08:12

## 2023-12-11 NOTE — SUBJECTIVE & OBJECTIVE
"Past Medical History:   Diagnosis Date    Acute on chronic pancreatitis 04/03/2016    Addiction to drug     CHF (congestive heart failure)     DKA (diabetic ketoacidoses) 02/2020    DM (diabetes mellitus), type 1     HCV (hepatitis C virus)     high VL     HTN (hypertension)     Hx of psychiatric care     Hypomagnesemia 05/25/2020    IVDU (intravenous drug user)     Heroin    Pancreatitis     Psychiatric problem     Stroke     Substance abuse        Past Surgical History:   Procedure Laterality Date    CARDIAC SURGERY  1999    stent placed. ([facility])    ESOPHAGOGASTRODUODENOSCOPY N/A 3/5/2020    Gastritis.  No H pylori.  Completed PPI for 1 month.  Procedure: EGD (ESOPHAGOGASTRODUODENOSCOPY);  Surgeon: Brinda Rene MD;  Location: [facility] ENDO;  Service: Endoscopy;  Laterality: N/A;  [id number]; [phone]    LEFT HEART CATHETERIZATION Left 9/28/2020    Procedure: Left heart cath;  Surgeon: Fito Rangel MD;  Location: [facility] CATH LAB;  Service: Cardiology;  Laterality: Left;    SHOULDER SURGERY  2013    right shoulder, spur removal.       Review of patient's allergies indicates:  No Known Allergies    No current facility-administered medications on file prior to encounter.     Current Outpatient Medications on File Prior to Encounter   Medication Sig    atorvastatin (LIPITOR) 20 MG tablet Take 1 tablet (20 mg total) by mouth once daily.    BD GERTRUDIS 2ND GEN PEN NEEDLE 32 gauge x 5/32" Ndle Inject into the skin 4 (four) times daily.    carBAMazepine (TEGRETOL XR) 200 MG 12 hr tablet Take 1 tablet (200 mg total) by mouth once daily. (Patient taking differently: Take 400 mg by mouth once daily.)    FREESTYLE SEBASTIAN 2 SENSOR Kit USE FOUR TIMES PER WEEK AS DIRECTED    insulin detemir U-100, Levemir, 100 unit/mL (3 mL) SubQ InPn pen Inject 10 Units into the skin every evening.    insulin lispro 100 unit/mL pen Inject into the skin.    levocetirizine (XYZAL) 5 MG tablet Take 5 mg by mouth every evening.    risperiDONE " (RISPERDAL M-TABS) 1 MG TbDL Take 1 tablet (1 mg total) by mouth 2 (two) times daily.    albuterol (PROVENTIL/VENTOLIN HFA) 90 mcg/actuation inhaler Inhale 1-2 puffs into the lungs every 6 (six) hours as needed for Wheezing. Rescue    aspirin 81 MG Chew Take 1 tablet (81 mg total) by mouth once daily.    carBAMazepine (TEGRETOL XR) 400 MG Tb12 Take 1 tablet (400 mg total) by mouth every evening.    cetirizine (ZYRTEC) 10 MG tablet Take 1 tablet (10 mg total) by mouth once daily.    clopidogreL (PLAVIX) 75 mg tablet Take 1 tablet (75 mg total) by mouth once daily.    insulin detemir U-100, Levemir, 100 unit/mL (3 mL) SubQ InPn pen Inject 17 Units into the skin once daily.    loratadine (CLARITIN) 10 mg tablet Take 10 mg by mouth.    metoprolol succinate (TOPROL-XL) 25 MG 24 hr tablet Take 1 tablet (25 mg total) by mouth once daily.    naloxone (NARCAN) 4 mg/actuation Spry 4mg by nasal route as needed for opioid overdose; may repeat every 2-3 minutes in alternating nostrils until medical help arrives. Call 911    ONETOUCH DELICA PLUS LANCET 33 gauge Misc Apply topically 4 (four) times daily.    ONETOUCH ULTRA TEST Strp USE TO TEST BLOOD SUGAR FOUR TIMES DAILY    ONETOUCH ULTRA2 METER Misc 4 (four) times daily.    venlafaxine (EFFEXOR-XR) 75 MG 24 hr capsule Take 1 capsule (75 mg total) by mouth once daily.    [DISCONTINUED] insulin aspart U-100 (NOVOLOG) 100 unit/mL injection Inject 1-10 Units into the skin 3 (three) times daily with meals.     Family History       Problem Relation (Age of Onset)    Asthma Mother          Tobacco Use    Smoking status: Former     Current packs/day: 0.00     Average packs/day: 1 pack/day for 39.2 years (39.2 ttl pk-yrs)     Types: Cigarettes     Start date: 4/3/1981     Quit date: 6/1/2020     Years since quitting: 3.5    Smokeless tobacco: Never    Tobacco comments:     states approx 1 per day.   Substance and Sexual Activity    Alcohol use: Not Currently     Alcohol/week: 0.0  standard drinks of alcohol    Drug use: Yes     Types: Heroin, IV, Marijuana, Amphetamines     Comment: chronic methamphetamine user    Sexual activity: Yes     Partners: Female     Review of Systems   Constitutional:  Positive for activity change, appetite change and fatigue. Negative for fever and unexpected weight change.   HENT:  Negative for trouble swallowing and voice change.    Eyes:  Negative for photophobia and visual disturbance.   Respiratory:  Negative for cough and shortness of breath.    Cardiovascular:  Negative for chest pain, palpitations and leg swelling.   Gastrointestinal:  Positive for nausea. Negative for abdominal distention, blood in stool, constipation and diarrhea.   Endocrine: Positive for polydipsia and polyuria.   Genitourinary:  Negative for difficulty urinating and dysuria.   Musculoskeletal:  Negative for neck pain and neck stiffness.   Skin:  Negative for pallor and rash.   Allergic/Immunologic: Negative for immunocompromised state.   Neurological:  Positive for light-headedness. Negative for seizures, syncope, facial asymmetry and weakness.   Psychiatric/Behavioral:  Positive for agitation and confusion. Negative for behavioral problems.      Objective:     Vital Signs (Most Recent):  Temp: 97.9 °F (36.6 °C) (12/10/23 1557)  Pulse: 91 (12/10/23 1830)  Resp: 18 (12/10/23 1557)  BP: (!) 160/82 (12/10/23 1830)  SpO2: 97 % (12/10/23 1830) Vital Signs (24h Range):  Temp:  [97.9 °F (36.6 °C)] 97.9 °F (36.6 °C)  Pulse:  [91-96] 91  Resp:  [18] 18  SpO2:  [97 %-98 %] 97 %  BP: (142-160)/(82-86) 160/82     Weight: 68 kg (150 lb)  Body mass index is 21.52 kg/m².     Physical Exam  Vitals and nursing note reviewed.   Constitutional:       General: He is in acute distress.      Appearance: He is well-developed and normal weight. He is ill-appearing, toxic-appearing and diaphoretic.   HENT:      Head: Normocephalic and atraumatic.      Nose: Nose normal. No congestion.      Mouth/Throat:       Mouth: Mucous membranes are dry.      Pharynx: No oropharyngeal exudate.   Eyes:      General: No scleral icterus.     Pupils: Pupils are equal, round, and reactive to light.   Neck:      Thyroid: No thyromegaly.   Cardiovascular:      Rate and Rhythm: Regular rhythm. Tachycardia present.      Heart sounds:      No friction rub. No gallop.   Pulmonary:      Effort: Pulmonary effort is normal.      Breath sounds: Normal breath sounds. No stridor. No wheezing or rales.   Abdominal:      General: There is no distension.      Palpations: Abdomen is soft. There is no mass.      Tenderness: There is abdominal tenderness.   Musculoskeletal:         General: No swelling or deformity. Normal range of motion.      Cervical back: Normal range of motion. No rigidity.      Right lower leg: No edema.      Left lower leg: No edema.   Lymphadenopathy:      Cervical: No cervical adenopathy.   Skin:     General: Skin is warm.      Capillary Refill: Capillary refill takes less than 2 seconds.      Coloration: Skin is not jaundiced.      Findings: No bruising.   Neurological:      Mental Status: He is alert and oriented to person, place, and time.      Cranial Nerves: No cranial nerve deficit.      Motor: No weakness.   Psychiatric:         Mood and Affect: Mood normal.         Behavior: Behavior normal.         Thought Content: Thought content normal.         Judgment: Judgment normal.              CRANIAL NERVES     CN III, IV, VI   Pupils are equal, round, and reactive to light.           Recent Results (from the past 24 hour(s))   CBC auto differential    Collection Time: 12/10/23  4:19 PM   Result Value Ref Range    WBC 8.70 3.90 - 12.70 K/uL    RBC 4.83 4.60 - 6.20 M/uL    Hemoglobin 12.1 (L) 14.0 - 18.0 g/dL    Hematocrit 39.1 (L) 40.0 - 54.0 %    MCV 81 (L) 82 - 98 fL    MCH 25.1 (L) 27.0 - 31.0 pg    MCHC 30.9 (L) 32.0 - 36.0 g/dL    RDW 13.4 11.5 - 14.5 %    Platelets 375 150 - 450 K/uL    MPV 9.5 9.2 - 12.9 fL    Immature  Granulocytes 2.0 (H) 0.0 - 0.5 %    Gran # (ANC) 6.1 1.8 - 7.7 K/uL    Immature Grans (Abs) 0.17 (H) 0.00 - 0.04 K/uL    Lymph # 1.1 1.0 - 4.8 K/uL    Mono # 1.3 (H) 0.3 - 1.0 K/uL    Eos # 0.1 0.0 - 0.5 K/uL    Baso # 0.04 0.00 - 0.20 K/uL    nRBC 0 0 /100 WBC    Gran % 69.6 38.0 - 73.0 %    Lymph % 12.6 (L) 18.0 - 48.0 %    Mono % 14.4 4.0 - 15.0 %    Eosinophil % 0.9 0.0 - 8.0 %    Basophil % 0.5 0.0 - 1.9 %    Differential Method Automated    Comprehensive metabolic panel    Collection Time: 12/10/23  4:19 PM   Result Value Ref Range    Sodium 134 (L) 136 - 145 mmol/L    Potassium 5.8 (H) 3.5 - 5.1 mmol/L    Chloride 95 95 - 110 mmol/L    CO2 19 (L) 23 - 29 mmol/L    Glucose 774 (HH) 70 - 110 mg/dL    BUN 34 (H) 6 - 20 mg/dL    Creatinine 1.7 (H) 0.5 - 1.4 mg/dL    Calcium 9.9 8.7 - 10.5 mg/dL    Total Protein 8.7 (H) 6.0 - 8.4 g/dL    Albumin 3.4 (L) 3.5 - 5.2 g/dL    Total Bilirubin 0.4 0.1 - 1.0 mg/dL    Alkaline Phosphatase 136 (H) 55 - 135 U/L    AST 15 10 - 40 U/L    ALT 33 10 - 44 U/L    eGFR 46 (A) >60 mL/min/1.73 m^2    Anion Gap 20 (H) 8 - 16 mmol/L   Beta - Hydroxybutyrate, Serum    Collection Time: 12/10/23  4:19 PM   Result Value Ref Range    Beta-Hydroxybutyrate 3.2 (H) 0.0 - 0.5 mmol/L   Lipase    Collection Time: 12/10/23  4:19 PM   Result Value Ref Range    Lipase 13 4 - 60 U/L   Magnesium    Collection Time: 12/10/23  4:19 PM   Result Value Ref Range    Magnesium 2.5 1.6 - 2.6 mg/dL   Phosphorus    Collection Time: 12/10/23  4:19 PM   Result Value Ref Range    Phosphorus 4.4 2.7 - 4.5 mg/dL   ISTAT PROCEDURE    Collection Time: 12/10/23  4:26 PM   Result Value Ref Range    POC PH 7.308 (L) 7.35 - 7.45    POC PCO2 55.0 (H) 35 - 45 mmHg    POC PO2 24 (LL) 40 - 60 mmHg    POC HCO3 27.5 24 - 28 mmol/L    POC BE 0 -2 to 2 mmol/L    POC SATURATED O2 37 95 - 100 %    POC TCO2 29 24 - 29 mmol/L    Rate 24     Sample VENOUS     Site Other     Allens Test N/A     DelSys Room Air     Mode SPONT     FiO2  21    POCT glucose    Collection Time: 12/10/23  5:20 PM   Result Value Ref Range    POCT Glucose >500 (HH) 70 - 110 mg/dL   Urinalysis, Reflex to Urine Culture Urine, Clean Catch    Collection Time: 12/10/23  5:40 PM    Specimen: Urine   Result Value Ref Range    Specimen UA Urine, Catheterized     Color, UA Colorless (A) Yellow, Straw, Monet    Appearance, UA Clear Clear    pH, UA 6.0 5.0 - 8.0    Specific Gravity, UA 1.020 1.005 - 1.030    Protein, UA Trace (A) Negative    Glucose, UA 4+ (A) Negative    Ketones, UA 1+ (A) Negative    Bilirubin (UA) Negative Negative    Occult Blood UA Trace (A) Negative    Nitrite, UA Negative Negative    Urobilinogen, UA Negative <2.0 EU/dL    Leukocytes, UA Trace (A) Negative   Urinalysis Microscopic    Collection Time: 12/10/23  5:40 PM   Result Value Ref Range    RBC, UA 1 0 - 4 /hpf    WBC, UA 12 (H) 0 - 5 /hpf    WBC Clumps, UA Rare None-Rare    Bacteria Rare None-Occ /hpf    Yeast, UA None None    Microscopic Comment SEE COMMENT    POCT glucose    Collection Time: 12/10/23  6:15 PM   Result Value Ref Range    POCT Glucose >500 (HH) 70 - 110 mg/dL       Microbiology Results (last 7 days)       Procedure Component Value Units Date/Time    Urine culture [[id number]] Collected: 12/10/23 1740    Order Status: No result Specimen: Urine Updated: 12/10/23 1802             Imaging Results              CT Head Without Contrast (Final result)  Result time 12/10/23 17:05:16      Final result by Chapito Walker MD (12/10/23 17:05:16)                   Impression:      Persistent ventriculomegaly.  Correlate for normal pressure hydrocephalus.    Stable area of encephalomalacia involving the occipital pole on the right.    No acute hemorrhage, mass or infarction.      Electronically signed by: Chapito Walker  Date:    12/10/2023  Time:    17:05               Narrative:    EXAMINATION:  CT HEAD WITHOUT CONTRAST    CLINICAL HISTORY:  Mental status change, unknown  cause;    TECHNIQUE:  Low dose axial CT images obtained throughout the head without intravenous contrast. Sagittal and coronal reconstructions were performed.    COMPARISON:  None.    FINDINGS:  Intracranial compartment:    Ventricles and sulci are stable in size with prominence of the lateral ventricular system communicating to the 4th ventricle and cisterna magna.  No extra-axial blood or fluid collections.    The brain parenchyma appears stable with low-density in encephalomalacia in the right occipital lobe again noted..  No new parenchymal mass, hemorrhage, edema or major vascular distribution infarct.    Skull/extracranial contents (limited evaluation): No fracture. Mastoid air cells and paranasal sinuses are essentially clear.                                       X-Ray Chest AP Portable (Final result)  Result time 12/10/23 16:42:03      Final result by Dianne Cantu MD (12/10/23 16:42:03)                   Impression:      No acute abnormality.      Electronically signed by: Dianne Cantu MD  Date:    12/10/2023  Time:    16:42               Narrative:    EXAMINATION:  XR CHEST AP PORTABLE    CLINICAL HISTORY:  hyperglycemia;    TECHNIQUE:  Single frontal view of the chest was performed.    COMPARISON:  07/22/2023    FINDINGS:  The lungs are clear with normal appearance of pulmonary vasculature. No pleural effusion. No evident pneumothorax.    The cardiac silhouette is normal in size. The hilar and mediastinal contours are unremarkable.    Bones are intact.

## 2023-12-11 NOTE — ED NOTES
Pt resting in bed, resp with ease and NAD noted at this time; pt alert to self when awake; IV D5 1/2 NS infusing @ 125/hr to PICC timothy to RT upper arm; IV Insulin infusing @0.05 units/kg/hr to LT forearm without signs of infiltration; VS per monitor; care ongoing

## 2023-12-11 NOTE — PROGRESS NOTES
[facility] Medicine  Progress Note    Patient Name: Cali Mabry  MRN: [id number]  Patient Class: IP- Inpatient   Admission Date: 12/10/2023  Length of Stay: 1 days  Attending Physician: Angela Mcmanus MD  Primary Care Provider: Lawanda Flores NP        Subjective:     Principal Problem:Diabetic ketoacidosis without coma        HPI:    Cali Mabry is a 58 y.o. male who has a past medical history of Acute on chronic pancreatitis, Addiction to drug, CHF, DM type 1, HCV, HTN (hypertension), psychiatric care, IVDU (intravenous drug user), Pancreatitis, Psychiatric problem, Stroke, and Substance abuse, presented to the ED with CC of Generalized Fatigue.     Patient is unable to give much of a history. He says he takes his insulin regularly, but unable to give me even a guess as to how much he takes. Nurse states he told her he has not taken any insulin for the last couple days. He was BIB [facility] after he slid out of his chair and unable to get up. Given patient being a poor historian and no family at bedside, will put information gathered by ED:     Per chart review, patient was seen at [facility] on 11/05 for generalized weakness. Per chart review, patient was seen at [facility] on 11/07 for generalized weakness. The patient left AMA on both ED visits. Patient notes left hand trauma s/p falling out a chair. Denies head trauma and LOC. He endorses associated nausea, vomiting, diarrhea, general abdominal pain, fatigue, and visual disturbance. Patient notes compliance with insulin lispro and insulin detemir U-100. No medication PTA. No other exacerbating or alleviating factors. Patient denies fever, chills, headache, or other associated symptoms.     Patient currently denies any CP, AP or SOB.     Overview/Hospital Course:  No notes on file    Interval History: weaned off insulin drip around 2 a.m. Mother at bedside with multiple concerns. Says patient does not ambulate well. He fell at an  outside facility hitting his face and hip. Has not been mobilizing well since. Also,  his psych meds are too strong. He sleeps all day and is not eating. Lastly, his Dexcom needs to be changed.     Review of Systems   Unable to perform ROS: Acuity of condition (patient asleep; not wanting to participate in interview)     Objective:     Vital Signs (Most Recent):  Temp: 98.7 °F (37.1 °C) (12/11/23 0758)  Pulse: 87 (12/11/23 0758)  Resp: 19 (12/11/23 0758)  BP: (!) 140/76 (12/11/23 0758)  SpO2: 96 % (12/11/23 0758) Vital Signs (24h Range):  Temp:  [97.7 °F (36.5 °C)-98.7 °F (37.1 °C)] 98.7 °F (37.1 °C)  Pulse:  [83-98] 87  Resp:  [16-24] 19  SpO2:  [94 %-98 %] 96 %  BP: (136-179)/(75-90) 140/76     Weight: 60.9 kg (134 lb 4.2 oz)  Body mass index is 19.26 kg/m².    Intake/Output Summary (Last 24 hours) at 12/11/2023 1041  Last data filed at 12/10/2023 2002  Gross per 24 hour   Intake 50 ml   Output --   Net 50 ml         Physical Exam  Vitals and nursing note reviewed.   Constitutional:       General: He is sleeping. He is not in acute distress.     Appearance: He is well-developed.   HENT:      Head: Normocephalic and atraumatic.   Eyes:      Conjunctiva/sclera: Conjunctivae normal.   Neck:      Vascular: No JVD.   Cardiovascular:      Rate and Rhythm: Normal rate and regular rhythm.      Heart sounds: Normal heart sounds.   Pulmonary:      Effort: Pulmonary effort is normal.      Breath sounds: Normal breath sounds.   Abdominal:      General: Bowel sounds are normal. There is no distension.      Palpations: Abdomen is soft.      Tenderness: There is no abdominal tenderness.   Musculoskeletal:      Cervical back: Neck supple.      Right lower leg: No edema.      Left lower leg: No edema.      Comments: +PICC   Neurological:      Mental Status: He is easily aroused. He is lethargic.   Psychiatric:         Attention and Perception: He is inattentive.         Speech: He is noncommunicative.         Behavior: Behavior is  withdrawn.      Comments: Keeps eyes closed. Will answer a q or 2 when shaken and asked             Significant Labs: All pertinent labs within the past 24 hours have been reviewed.  BMP:   Recent Labs   Lab 12/11/23  0016   *      K 3.7      CO2 27   BUN 30*   CREATININE 1.2   CALCIUM 9.7   MG 2.4     CBC:   Recent Labs   Lab 12/10/23  1619   WBC 8.70   HGB 12.1*   HCT 39.1*          Significant Imaging: I have reviewed all pertinent imaging results/findings within the past 24 hours.    Assessment/Plan:      * Diabetic ketoacidosis without coma- resolved  DM I  Patient's FSGs are controlled on current medication regimen.  Last A1c reviewed-   Lab Results   Component Value Date    HGBA1C 9.2 (H) 08/23/2023     Most recent fingerstick glucose reviewed-   Recent Labs   Lab 12/10/23  2321 12/11/23  0018 12/11/23  0117 12/11/23  0759   POCTGLUCOSE 308* 245* 184* 106     Current correctional scale  Medium  Maintain anti-hyperglycemic dose as follows-   Antihyperglycemics (From admission, onward)      Start     Stop Route Frequency Ordered    12/11/23 0715  insulin aspart U-100 pen 4 Units  (Order Panel)         -- SubQ 3 times daily with meals 12/11/23 0150    12/11/23 0249  insulin aspart U-100 pen 1-10 Units  (Order Panel)         -- SubQ Before meals & nightly PRN 12/11/23 0150    12/11/23 0200  insulin detemir U-100 (Levemir) pen 15 Units  (Order Panel)         -- SubQ Daily 12/11/23 0150          Diabetic diet         HTN   - uncontrolled. Continue metoprolol. Add Losartan.     Bipolar disorder  Cont Risperidone, Venlafaxine, and Carbamazepine   Telepscyh consulted to possibly modify his meds       Amphetamine use disorder, severe, dependence  Noted hx  Negative this admission      CAD (coronary artery disease)  Patient with known CAD   No acute issues  Cont ASA, Statin, and Plavix    COPD (chronic obstructive pulmonary disease)  Patient's COPD is controlled currently.    Continue scheduled  inhalers  prn  and monitor respiratory status closely.     Opioid abuse  Noted hx    Chronic hepatitis C  Noted hx    RAMYA- resolved     VTE Risk Mitigation (From admission, onward)           Ordered     Place DAWIT hose  Until discontinued         12/10/23 1727     Place sequential compression device  Until discontinued         12/10/23 1727     IP VTE LOW RISK PATIENT  Once         12/10/23 1727                    Discharge Planning   ROLA:      Code Status: Full Code   Is the patient medically ready for discharge?:     Reason for patient still in hospital (select all that apply): Patient trending condition, Treatment, and PT / OT recommendations             Angela Mcmanus MD  Department of Hospital Medicine   [facility]

## 2023-12-11 NOTE — PLAN OF CARE
Problem: Occupational Therapy  Goal: Occupational Therapy Goal  Description: Goals to be met by: 12/25/23     Patient will increase functional independence with ADLs by performing:    Feeding with Modified [place].  UE Dressing with Modified [place].  LE Dressing with Stand-by Assistance.  Grooming while seated with Supervision.  Supine to sit with Supervision.  Upper extremity exercise program x10 reps per handout, with supervision.    Step transfer- to be determined pending further pt participation   Toilet transfer to bedside commode - to be determined pending further pt participation   Toileting from bedside commode for hygiene and clothing management- to be determined pending further pt participation     Outcome: Ongoing, Progressing     Total A supine<>sit. MOD A for sit balance at EOB. Inconsistent efforts with assessments. Pt calm during session.

## 2023-12-11 NOTE — ASSESSMENT & PLAN NOTE
Patient's FSGs are uncontrolled due to hyperglycemia on current medication regimen.  Last A1c reviewed-   Lab Results   Component Value Date    HGBA1C 9.2 (H) 08/23/2023     Antihyperglycemics (From admission, onward)      Start     Stop Route Frequency Ordered    12/10/23 1715  insulin regular in 0.9 % NaCl 100 unit/100 mL (1 unit/mL) infusion        Question Answer Comment   Insulin Rate Adjustment (DO NOT MODIFY ANSWER) [email or web address] ADJUSTMENT DKA version [email or web address]    Initial dose (DO NOT CHANGE): 0.1 units/kg/hr        -- IV Continuous 12/10/23 1711          Hold Oral hypoglycemics while patient is in the hospital.  Patient has no idea how much insulin he takes  Insulin infusion initiated  IF Glucose is >250, run 0.9% NS at 125 mL/hr along with insulin infusion.  IF Glucose is < 250, run D5+0.45% NS at 125 mL/hr along with insulin infusion.  Q.4 BMP, Ph, Mg checks  Q 1 hour glucose checks

## 2023-12-11 NOTE — PHARMACY MED REC
"....Admission Medication History     The home medication history was taken by Etelvina Kimbrough.    You may go to "Admission" then "Reconcile Home Medications" tabs to review and/or act upon these items.     The home medication list has been updated by the Pharmacy department.   Please read ALL comments highlighted in yellow.   Please address this information as you see fit.    Feel free to contact us if you have any questions or require assistance.    Medications listed below were obtained from: Patient/family and Analytic software- [email or web address]      The medication reconciliation was completed using [email or web address]; because the patient was unfamiliar with medication.        Etelvina Kimbrough  [phone]                  .          "

## 2023-12-11 NOTE — PT/OT/SLP EVAL
"Physical Therapy Evaluation     Patient Name: Cali Mabry   MRN: [id number]  Recent Surgery: * No surgery found *      Recommendations:     Discharge Recommendations: Moderate Intensity Therapy   Discharge Equipment Recommendations:  (TBD)       Assessment:     Cali Mabry is a 58 y.o. male admitted with a medical diagnosis of Diabetic ketoacidosis without coma. He presents with the following impairments/functional limitations: weakness, impaired balance, impaired endurance, impaired functional mobility, gait instability.     Rehab Prognosis: Fair; patient would benefit from acute PT services to address these deficits and reach maximum level of function.    Plan:     During this hospitalization, patient to be seen 3 x/week to address the above listed problems via gait training, therapeutic activities, therapeutic exercises    Plan of Care Expires: 12/18/23    Subjective     Chief Complaint: I can't...  Patient Comments/Goals: "Can you give me some water?"  Pain/Comfort:  Pain Rating 1: 0/10    Social History:  Living Environment: Patient lives with their niece and nephew and mother in a single story home with number of outside stair(s): 0  Prior Level of Function: Prior to admission, patient was modified independent  Equipment Used at Home: cane, straight  DME owned (not currently used): none  Assistance Upon Discharge: family    Objective:     Communicated with nurseHalley prior to session. Patient found supine with peripheral IV upon PT entry to room.    General Precautions: Standard,     Orthopedic Precautions: N/A   Braces: N/A    Respiratory Status: Room air    Exams:  Cognition: NT  RLE ROM: WFL  RLE Strength:  unable to MMT  LLE ROM: WFL  LLE Strength:  unable to MMT  Sensation:    -       Intact  light/touch BLEs    Functional Mobility:  Gait belt applied - No  Bed Mobility  Supine to Sit: total assistance for LE management and trunk management  Sit to Supine: total assistance for LE management " and trunk management  Transfers  NT  Gait: NT  Balance  Sitting: moderate assistance  Standing: NT; pt declined      Therapeutic Activities and Exercises:   Patient educated on role of acute care PT and PT POC and call light usage  Transferred to EOB, pt with increased trunk extension requiring cues for forward flexion, scooted to EOB with max x2 then assisted back to supine with pillow on (L) side.  Pt requested water, able to pick water pitcher and drink and put back on table with min A to make sure it was on table completely.    AM-PAC 6 CLICK MOBILITY  Total Score:8    Patient left supine with all lines intact, call button in reach, and all needs in reach .    GOALS:   Multidisciplinary Problems       Physical Therapy Goals          Problem: Physical Therapy    Goal Priority Disciplines Outcome Goal Variances Interventions   Physical Therapy Goal     PT, PT/OT Ongoing, Progressing     Description: Goals to be met by: 23     Patient will increase functional independence with mobility by performin. Pt to be min A with bed mobility.  2. Pt to transfer with min A.  3. Pt to ambulate 50' w/RW CGA.  4. Pt to be able to tolerate 1x10 reps BLE exs with Vcs.                         History:     Past Medical History:   Diagnosis Date    Acute on chronic pancreatitis 2016    Addiction to drug     CHF (congestive heart failure)     DKA (diabetic ketoacidoses) 2020    DM (diabetes mellitus), type 1     HCV (hepatitis C virus)     high VL     HTN (hypertension)     Hx of psychiatric care     Hypomagnesemia 2020    IVDU (intravenous drug user)     Heroin    Pancreatitis     Psychiatric problem     Stroke     Substance abuse        Past Surgical History:   Procedure Laterality Date    CARDIAC SURGERY      stent placed. ([facility])    ESOPHAGOGASTRODUODENOSCOPY N/A 3/5/2020    Gastritis.  No H pylori.  Completed PPI for 1 month.  Procedure: EGD (ESOPHAGOGASTRODUODENOSCOPY);  Surgeon: Brinda  Carlitos Rene MD;  Location: [facility] ENDO;  Service: Endoscopy;  Laterality: N/A;  W421 A; [id number]    LEFT HEART CATHETERIZATION Left 9/28/2020    Procedure: Left heart cath;  Surgeon: Fito Rangel MD;  Location: [facility] CATH LAB;  Service: Cardiology;  Laterality: Left;    SHOULDER SURGERY  2013    right shoulder, spur removal.       Time Tracking:     PT Received On: 12/11/23  PT Start Time: 1427  PT Stop Time: 1441  PT Total Time (min): 14 min     Billable Minutes: Evaluation 14 (Co-eval with IVAN Santos)    12/11/2023

## 2023-12-11 NOTE — NURSING
Nurses Note -- 4 Eyes      12/11/2023   9:22 AM      Skin assessed during: Q Shift Change      [] No Altered Skin Integrity Present    []Prevention Measures Documented      [x] Yes- Altered Skin Integrity Present or Discovered   [] LDA Added if Not in Epic (Describe Wound)   [] New Altered Skin Integrity was Present on Admit and Documented in LDA   [] Wound Image Taken    Wound Care Consulted? No    Attending Nurse:  Halley Victor RN    Second RN/Staff Member:   RITA Priest    Skin tear to left scrotum and bilateral lower extremity scabs to shin area

## 2023-12-11 NOTE — PLAN OF CARE
Problem: Physical Therapy  Goal: Physical Therapy Goal  Description: Goals to be met by: 23     Patient will increase functional independence with mobility by performin. Pt to be min A with bed mobility.  2. Pt to transfer with min A.  3. Pt to ambulate 50' w/RW CGA.  4. Pt to be able to tolerate 1x10 reps BLE exs with Vcs.    Outcome: Ongoing, Progressing   Initial eval completed, see in chart for details.

## 2023-12-11 NOTE — PROCEDURES
"Cali Mabry is a 58 y.o. male patient.    Temp: 97.9 °F (36.6 °C) (12/10/23 1557)  Pulse: 97 (12/10/23 2327)  Resp: 16 (12/10/23 2327)  BP: (!) 159/81 (12/10/23 2327)  SpO2: 97 % (12/10/23 2327)  Weight: 68 kg (150 lb) (12/10/23 1557)  Height: 5' 10" (177.8 cm) (12/10/23 1557)    PICC  Date/Time: 12/11/2023 12:35 AM  Performed by: Jasvir Oscar RN  Consent Done: Yes  Time out: Immediately prior to procedure a time out was called to verify the correct patient, procedure, equipment, support staff and site/side marked as required  Indications: med administration and vascular access  Anesthesia: local infiltration  Local anesthetic: lidocaine 1% without epinephrine  Anesthetic Total (mL): 2  Preparation: skin prepped with ChloraPrep  Skin prep agent dried: skin prep agent completely dried prior to procedure  Sterile barriers: all five maximum sterile barriers used - cap, mask, sterile gown, sterile gloves, and large sterile sheet  Hand hygiene: hand hygiene performed prior to central venous catheter insertion  Location details: right basilic  Catheter type: double lumen  Catheter size: 5 Fr  Catheter Length: 36cm    Ultrasound guidance: yes  Vessel Caliber: large and patent, compressibility normal  Vascular Doppler: not done  Needle advanced into vessel with real time Ultrasound guidance.  Guidewire confirmed in vessel.  Sterile sheath used.  no esophageal manometryNumber of attempts: 1  Post-procedure: blood return through all ports, chlorhexidine patch and sterile dressing applied  Estimated blood loss (mL): 0            Name Jasvir Oscar RN  12/11/2023    "

## 2023-12-11 NOTE — ED NOTES
Pt resting in bed and denies any new complaints at this time; pt AAOx4 at this time and answers questions appropriately; care ongoing

## 2023-12-11 NOTE — H&P
[facility]  [facility] Medicine  History & Physical    Patient Name: Cali Mabry  MRN: [id number]  Patient Class: IP- Inpatient  Admission Date: 12/10/2023  Attending Physician: Noble Bach MD   Primary Care Provider: Lawanda Flores NP         Patient information was obtained from patient, past medical records, and ER records.     Subjective:     Principal Problem:Diabetic ketoacidosis without coma    Chief Complaint:   Chief Complaint   Patient presents with    Fatigue     PT BIB [facility] TODAY FOR WEAKNESS AND FATIGUE. PT SLID OUT OF A CHAIR AND WAS UNABLE TO GET UP. CBG HIGH.         HPI:   Cali Mabry is a 58 y.o. male who has a past medical history of Acute on chronic pancreatitis, Addiction to drug, CHF, DM type 1, HCV, HTN (hypertension), psychiatric care, IVDU (intravenous drug user), Pancreatitis, Psychiatric problem, Stroke, and Substance abuse, presented to the ED with CC of Generalized Fatigue.     Patient is unable to give much of a history. He says he takes his insulin regularly, but unable to give me even a guess as to how much he takes. Nurse states he told her he has not taken any insulin for the last couple days. He was BIB [facility] after he slid out of his chair and unable to get up. Given patient being a poor historian and no family at bedside, will put information gathered by ED:     Per chart review, patient was seen at [facility] on 11/05 for generalized weakness. Per chart review, patient was seen at [facility] on 11/07 for generalized weakness. The patient left AMA on both ED visits. Patient notes left hand trauma s/p falling out a chair. Denies head trauma and LOC. He endorses associated nausea, vomiting, diarrhea, general abdominal pain, fatigue, and visual disturbance. Patient notes compliance with insulin lispro and insulin detemir U-100. No medication PTA. No other exacerbating or alleviating factors. Patient denies fever, chills, headache, or other associated  "symptoms.     Patient currently denies any CP, AP or SOB.     Past Medical History:   Diagnosis Date    Acute on chronic pancreatitis 04/03/2016    Addiction to drug     CHF (congestive heart failure)     DKA (diabetic ketoacidoses) 02/2020    DM (diabetes mellitus), type 1     HCV (hepatitis C virus)     high VL     HTN (hypertension)     Hx of psychiatric care     Hypomagnesemia 05/25/2020    IVDU (intravenous drug user)     Heroin    Pancreatitis     Psychiatric problem     Stroke     Substance abuse        Past Surgical History:   Procedure Laterality Date    CARDIAC SURGERY  1999    stent placed. ([facility])    ESOPHAGOGASTRODUODENOSCOPY N/A 3/5/2020    Gastritis.  No H pylori.  Completed PPI for 1 month.  Procedure: EGD (ESOPHAGOGASTRODUODENOSCOPY);  Surgeon: Brinda Rene MD;  Location: [facility] ENDO;  Service: Endoscopy;  Laterality: N/A;  [id number]; [phone]    LEFT HEART CATHETERIZATION Left 9/28/2020    Procedure: Left heart cath;  Surgeon: Fito Rangel MD;  Location: [facility] CATH LAB;  Service: Cardiology;  Laterality: Left;    SHOULDER SURGERY  2013    right shoulder, spur removal.       Review of patient's allergies indicates:  No Known Allergies    No current facility-administered medications on file prior to encounter.     Current Outpatient Medications on File Prior to Encounter   Medication Sig    atorvastatin (LIPITOR) 20 MG tablet Take 1 tablet (20 mg total) by mouth once daily.    BD GERTRUDIS 2ND GEN PEN NEEDLE 32 gauge x 5/32" Ndle Inject into the skin 4 (four) times daily.    carBAMazepine (TEGRETOL XR) 200 MG 12 hr tablet Take 1 tablet (200 mg total) by mouth once daily. (Patient taking differently: Take 400 mg by mouth once daily.)    FREESTYLE SEBASTIAN 2 SENSOR Kit USE FOUR TIMES PER WEEK AS DIRECTED    insulin detemir U-100, Levemir, 100 unit/mL (3 mL) SubQ InPn pen Inject 10 Units into the skin every evening.    insulin lispro 100 unit/mL pen Inject into the skin.    levocetirizine (XYZAL) 5 " MG tablet Take 5 mg by mouth every evening.    risperiDONE (RISPERDAL M-TABS) 1 MG TbDL Take 1 tablet (1 mg total) by mouth 2 (two) times daily.    albuterol (PROVENTIL/VENTOLIN HFA) 90 mcg/actuation inhaler Inhale 1-2 puffs into the lungs every 6 (six) hours as needed for Wheezing. Rescue    aspirin 81 MG Chew Take 1 tablet (81 mg total) by mouth once daily.    carBAMazepine (TEGRETOL XR) 400 MG Tb12 Take 1 tablet (400 mg total) by mouth every evening.    cetirizine (ZYRTEC) 10 MG tablet Take 1 tablet (10 mg total) by mouth once daily.    clopidogreL (PLAVIX) 75 mg tablet Take 1 tablet (75 mg total) by mouth once daily.    insulin detemir U-100, Levemir, 100 unit/mL (3 mL) SubQ InPn pen Inject 17 Units into the skin once daily.    loratadine (CLARITIN) 10 mg tablet Take 10 mg by mouth.    metoprolol succinate (TOPROL-XL) 25 MG 24 hr tablet Take 1 tablet (25 mg total) by mouth once daily.    naloxone (NARCAN) 4 mg/actuation Spry 4mg by nasal route as needed for opioid overdose; may repeat every 2-3 minutes in alternating nostrils until medical help arrives. Call 911    ONETOUCH DELICA PLUS LANCET 33 gauge Misc Apply topically 4 (four) times daily.    ONETOUCH ULTRA TEST Strp USE TO TEST BLOOD SUGAR FOUR TIMES DAILY    ONETOUCH ULTRA2 METER Misc 4 (four) times daily.    venlafaxine (EFFEXOR-XR) 75 MG 24 hr capsule Take 1 capsule (75 mg total) by mouth once daily.    [DISCONTINUED] insulin aspart U-100 (NOVOLOG) 100 unit/mL injection Inject 1-10 Units into the skin 3 (three) times daily with meals.     Family History       Problem Relation (Age of Onset)    Asthma Mother          Tobacco Use    Smoking status: Former     Current packs/day: 0.00     Average packs/day: 1 pack/day for 39.2 years (39.2 ttl pk-yrs)     Types: Cigarettes     Start date: 4/3/1981     Quit date: 6/1/2020     Years since quitting: 3.5    Smokeless tobacco: Never    Tobacco comments:     states approx 1 per day.   Substance and Sexual Activity     Alcohol use: Not Currently     Alcohol/week: 0.0 standard drinks of alcohol    Drug use: Yes     Types: Heroin, IV, Marijuana, Amphetamines     Comment: chronic methamphetamine user    Sexual activity: Yes     Partners: Female     Review of Systems   Constitutional:  Positive for activity change, appetite change and fatigue. Negative for fever and unexpected weight change.   HENT:  Negative for trouble swallowing and voice change.    Eyes:  Negative for photophobia and visual disturbance.   Respiratory:  Negative for cough and shortness of breath.    Cardiovascular:  Negative for chest pain, palpitations and leg swelling.   Gastrointestinal:  Positive for nausea. Negative for abdominal distention, blood in stool, constipation and diarrhea.   Endocrine: Positive for polydipsia and polyuria.   Genitourinary:  Negative for difficulty urinating and dysuria.   Musculoskeletal:  Negative for neck pain and neck stiffness.   Skin:  Negative for pallor and rash.   Allergic/Immunologic: Negative for immunocompromised state.   Neurological:  Positive for light-headedness. Negative for seizures, syncope, facial asymmetry and weakness.   Psychiatric/Behavioral:  Positive for agitation and confusion. Negative for behavioral problems.      Objective:     Vital Signs (Most Recent):  Temp: 97.9 °F (36.6 °C) (12/10/23 1557)  Pulse: 91 (12/10/23 1830)  Resp: 18 (12/10/23 1557)  BP: (!) 160/82 (12/10/23 1830)  SpO2: 97 % (12/10/23 1830) Vital Signs (24h Range):  Temp:  [97.9 °F (36.6 °C)] 97.9 °F (36.6 °C)  Pulse:  [91-96] 91  Resp:  [18] 18  SpO2:  [97 %-98 %] 97 %  BP: (142-160)/(82-86) 160/82     Weight: 68 kg (150 lb)  Body mass index is 21.52 kg/m².     Physical Exam  Vitals and nursing note reviewed.   Constitutional:       General: He is in acute distress.      Appearance: He is well-developed and normal weight. He is ill-appearing, toxic-appearing and diaphoretic.   HENT:      Head: Normocephalic and atraumatic.      Nose:  Nose normal. No congestion.      Mouth/Throat:      Mouth: Mucous membranes are dry.      Pharynx: No oropharyngeal exudate.   Eyes:      General: No scleral icterus.     Pupils: Pupils are equal, round, and reactive to light.   Neck:      Thyroid: No thyromegaly.   Cardiovascular:      Rate and Rhythm: Regular rhythm. Tachycardia present.      Heart sounds:      No friction rub. No gallop.   Pulmonary:      Effort: Pulmonary effort is normal.      Breath sounds: Normal breath sounds. No stridor. No wheezing or rales.   Abdominal:      General: There is no distension.      Palpations: Abdomen is soft. There is no mass.      Tenderness: There is abdominal tenderness.   Musculoskeletal:         General: No swelling or deformity. Normal range of motion.      Cervical back: Normal range of motion. No rigidity.      Right lower leg: No edema.      Left lower leg: No edema.   Lymphadenopathy:      Cervical: No cervical adenopathy.   Skin:     General: Skin is warm.      Capillary Refill: Capillary refill takes less than 2 seconds.      Coloration: Skin is not jaundiced.      Findings: No bruising.   Neurological:      Mental Status: He is alert and oriented to person, place, and time.      Cranial Nerves: No cranial nerve deficit.      Motor: No weakness.   Psychiatric:         Mood and Affect: Mood normal.         Behavior: Behavior normal.         Thought Content: Thought content normal.         Judgment: Judgment normal.              CRANIAL NERVES     CN III, IV, VI   Pupils are equal, round, and reactive to light.           Recent Results (from the past 24 hour(s))   CBC auto differential    Collection Time: 12/10/23  4:19 PM   Result Value Ref Range    WBC 8.70 3.90 - 12.70 K/uL    RBC 4.83 4.60 - 6.20 M/uL    Hemoglobin 12.1 (L) 14.0 - 18.0 g/dL    Hematocrit 39.1 (L) 40.0 - 54.0 %    MCV 81 (L) 82 - 98 fL    MCH 25.1 (L) 27.0 - 31.0 pg    MCHC 30.9 (L) 32.0 - 36.0 g/dL    RDW 13.4 11.5 - 14.5 %    Platelets 375  150 - 450 K/uL    MPV 9.5 9.2 - 12.9 fL    Immature Granulocytes 2.0 (H) 0.0 - 0.5 %    Gran # (ANC) 6.1 1.8 - 7.7 K/uL    Immature Grans (Abs) 0.17 (H) 0.00 - 0.04 K/uL    Lymph # 1.1 1.0 - 4.8 K/uL    Mono # 1.3 (H) 0.3 - 1.0 K/uL    Eos # 0.1 0.0 - 0.5 K/uL    Baso # 0.04 0.00 - 0.20 K/uL    nRBC 0 0 /100 WBC    Gran % 69.6 38.0 - 73.0 %    Lymph % 12.6 (L) 18.0 - 48.0 %    Mono % 14.4 4.0 - 15.0 %    Eosinophil % 0.9 0.0 - 8.0 %    Basophil % 0.5 0.0 - 1.9 %    Differential Method Automated    Comprehensive metabolic panel    Collection Time: 12/10/23  4:19 PM   Result Value Ref Range    Sodium 134 (L) 136 - 145 mmol/L    Potassium 5.8 (H) 3.5 - 5.1 mmol/L    Chloride 95 95 - 110 mmol/L    CO2 19 (L) 23 - 29 mmol/L    Glucose 774 (HH) 70 - 110 mg/dL    BUN 34 (H) 6 - 20 mg/dL    Creatinine 1.7 (H) 0.5 - 1.4 mg/dL    Calcium 9.9 8.7 - 10.5 mg/dL    Total Protein 8.7 (H) 6.0 - 8.4 g/dL    Albumin 3.4 (L) 3.5 - 5.2 g/dL    Total Bilirubin 0.4 0.1 - 1.0 mg/dL    Alkaline Phosphatase 136 (H) 55 - 135 U/L    AST 15 10 - 40 U/L    ALT 33 10 - 44 U/L    eGFR 46 (A) >60 mL/min/1.73 m^2    Anion Gap 20 (H) 8 - 16 mmol/L   Beta - Hydroxybutyrate, Serum    Collection Time: 12/10/23  4:19 PM   Result Value Ref Range    Beta-Hydroxybutyrate 3.2 (H) 0.0 - 0.5 mmol/L   Lipase    Collection Time: 12/10/23  4:19 PM   Result Value Ref Range    Lipase 13 4 - 60 U/L   Magnesium    Collection Time: 12/10/23  4:19 PM   Result Value Ref Range    Magnesium 2.5 1.6 - 2.6 mg/dL   Phosphorus    Collection Time: 12/10/23  4:19 PM   Result Value Ref Range    Phosphorus 4.4 2.7 - 4.5 mg/dL   ISTAT PROCEDURE    Collection Time: 12/10/23  4:26 PM   Result Value Ref Range    POC PH 7.308 (L) 7.35 - 7.45    POC PCO2 55.0 (H) 35 - 45 mmHg    POC PO2 24 (LL) 40 - 60 mmHg    POC HCO3 27.5 24 - 28 mmol/L    POC BE 0 -2 to 2 mmol/L    POC SATURATED O2 37 95 - 100 %    POC TCO2 29 24 - 29 mmol/L    Rate 24     Sample VENOUS     Site Reyna Hendrix  Test N/A     DelSys Room Air     Mode SPONT     FiO2 21    POCT glucose    Collection Time: 12/10/23  5:20 PM   Result Value Ref Range    POCT Glucose >500 (HH) 70 - 110 mg/dL   Urinalysis, Reflex to Urine Culture Urine, Clean Catch    Collection Time: 12/10/23  5:40 PM    Specimen: Urine   Result Value Ref Range    Specimen UA Urine, Catheterized     Color, UA Colorless (A) Yellow, Straw, Monet    Appearance, UA Clear Clear    pH, UA 6.0 5.0 - 8.0    Specific Gravity, UA 1.020 1.005 - 1.030    Protein, UA Trace (A) Negative    Glucose, UA 4+ (A) Negative    Ketones, UA 1+ (A) Negative    Bilirubin (UA) Negative Negative    Occult Blood UA Trace (A) Negative    Nitrite, UA Negative Negative    Urobilinogen, UA Negative <2.0 EU/dL    Leukocytes, UA Trace (A) Negative   Urinalysis Microscopic    Collection Time: 12/10/23  5:40 PM   Result Value Ref Range    RBC, UA 1 0 - 4 /hpf    WBC, UA 12 (H) 0 - 5 /hpf    WBC Clumps, UA Rare None-Rare    Bacteria Rare None-Occ /hpf    Yeast, UA None None    Microscopic Comment SEE COMMENT    POCT glucose    Collection Time: 12/10/23  6:15 PM   Result Value Ref Range    POCT Glucose >500 (HH) 70 - 110 mg/dL       Microbiology Results (last 7 days)       Procedure Component Value Units Date/Time    Urine culture [[id number]] Collected: 12/10/23 1740    Order Status: No result Specimen: Urine Updated: 12/10/23 1802             Imaging Results              CT Head Without Contrast (Final result)  Result time 12/10/23 17:05:16      Final result by Chapito Walker MD (12/10/23 17:05:16)                   Impression:      Persistent ventriculomegaly.  Correlate for normal pressure hydrocephalus.    Stable area of encephalomalacia involving the occipital pole on the right.    No acute hemorrhage, mass or infarction.      Electronically signed by: Chapito Walker  Date:    12/10/2023  Time:    17:05               Narrative:    EXAMINATION:  CT HEAD WITHOUT CONTRAST    CLINICAL  HISTORY:  Mental status change, unknown cause;    TECHNIQUE:  Low dose axial CT images obtained throughout the head without intravenous contrast. Sagittal and coronal reconstructions were performed.    COMPARISON:  None.    FINDINGS:  Intracranial compartment:    Ventricles and sulci are stable in size with prominence of the lateral ventricular system communicating to the 4th ventricle and cisterna magna.  No extra-axial blood or fluid collections.    The brain parenchyma appears stable with low-density in encephalomalacia in the right occipital lobe again noted..  No new parenchymal mass, hemorrhage, edema or major vascular distribution infarct.    Skull/extracranial contents (limited evaluation): No fracture. Mastoid air cells and paranasal sinuses are essentially clear.                                       X-Ray Chest AP Portable (Final result)  Result time 12/10/23 16:42:03      Final result by Dianne Cantu MD (12/10/23 16:42:03)                   Impression:      No acute abnormality.      Electronically signed by: Dianne Cantu MD  Date:    12/10/2023  Time:    16:42               Narrative:    EXAMINATION:  XR CHEST AP PORTABLE    CLINICAL HISTORY:  hyperglycemia;    TECHNIQUE:  Single frontal view of the chest was performed.    COMPARISON:  07/22/2023    FINDINGS:  The lungs are clear with normal appearance of pulmonary vasculature. No pleural effusion. No evident pneumothorax.    The cardiac silhouette is normal in size. The hilar and mediastinal contours are unremarkable.    Bones are intact.                                        Assessment/Plan:     * Diabetic ketoacidosis without coma  Patient's FSGs are uncontrolled due to hyperglycemia on current medication regimen.  Last A1c reviewed-   Lab Results   Component Value Date    HGBA1C 9.2 (H) 08/23/2023     Antihyperglycemics (From admission, onward)      Start     Stop Route Frequency Ordered    12/10/23 9570  insulin regular in 0.9 % NaCl 100  unit/100 mL (1 unit/mL) infusion        Question Answer Comment   Insulin Rate Adjustment (DO NOT MODIFY ANSWER) \\[email or web address] ADJUSTMENT DKA version [email or web address]    Initial dose (DO NOT CHANGE): 0.1 units/kg/hr        -- IV Continuous 12/10/23 1711          Hold Oral hypoglycemics while patient is in the hospital.  Patient has no idea how much insulin he takes  Insulin infusion initiated  IF Glucose is >250, run 0.9% NS at 125 mL/hr along with insulin infusion.  IF Glucose is < 250, run D5+0.45% NS at 125 mL/hr along with insulin infusion.  Q.4 BMP, Ph, Mg checks  Q 1 hour glucose checks    Chronic hepatitis C  Noted hx      Bipolar disorder  Cont Risperidone, Venlafaxine, and Carbamazepine       Amphetamine use disorder, severe, dependence  Noted hx  Will add on urine drug tox      Type 1 diabetes  See dka note    CAD (coronary artery disease)  Patient with known CAD   No CP  Cont ASA, Statin, and Plavix    COPD (chronic obstructive pulmonary disease)  Patient's COPD is controlled currently.    [facility]   Continue scheduled inhalers  prn  and monitor respiratory status closely.     Opioid abuse  Noted hx  Will add on drug tox        VTE Risk Mitigation (From admission, onward)           Ordered     Place DAWIT hose  Until discontinued         12/10/23 1727     Place sequential compression device  Until discontinued         12/10/23 1727     IP VTE LOW RISK PATIENT  Once         12/10/23 1727                         Critical Care Time: 35 minutes        Critical care was time spent personally by me on the following activities: evaluating this patient's organ dysfunction, development of treatment plan, discussing treatment plan with patient or surrogate and bedside caregivers, discussions with consultants, evaluation of patient's response to treatment, examination of patient, ordering and performing treatments and interventions, ordering  and review of laboratory studies, ordering and review of radiographic studies, re-evaluation of patient's condition. This critical care time did not overlap with that of any other provider or involve time for any separately billed procedures    Diagnosis requiring critical care: DKA        AdmissionCare    Guideline: Diabetes - INPT, Inpatient    Based on the indications selected for the patient, the bed status of Admit to Inpatient was determined to be MET    The following indications were selected as present at the time of evaluation of the patient:      Diabetic ketoacidosis that requires inpatient management, as indicated by ALL of the following:   -     - Hyperglycemia (eg, plasma glucose greater than 200 mg/dL (11.10 mmol/L))    - Ketonuria or ketonemia (eg, serum beta hydroxybutyrate greater than 3 mmol/L, or moderate to large ketonuria)    - Acidosis (eg, arterial or venous pH less than 7.30, or serum bicarbonate less than 15 mEq/L (mmol/L))    Inpatient management appropriate, as indicated by 1 or more of the following:    -      - Inpatient treatment of underlying etiology needed (eg, infection)    AdmissionCare documentation entered by: Celia Ontiveros    [facility], 27th edition, Copyright © 2023 [facility] All Rights Reserved.  [id number]:16:44-06:00    Jose Mitchell MD  Department of Hospital Medicine  [facility] - Emergency Dept

## 2023-12-11 NOTE — HPI
Cali Mabry is a 58 y.o. male who has a past medical history of Acute on chronic pancreatitis, Addiction to drug, CHF, DM type 1, HCV, HTN (hypertension), psychiatric care, IVDU (intravenous drug user), Pancreatitis, Psychiatric problem, Stroke, and Substance abuse, presented to the ED with CC of Generalized Fatigue.     Patient is unable to give much of a history. He says he takes his insulin regularly, but unable to give me even a guess as to how much he takes. Nurse states he told her he has not taken any insulin for the last couple days. He was BIB [facility] after he slid out of his chair and unable to get up. Given patient being a poor historian and no family at bedside, will put information gathered by ED:     Per chart review, patient was seen at [facility] on 11/05 for generalized weakness. Per chart review, patient was seen at [facility] on 11/07 for generalized weakness. The patient left AMA on both ED visits. Patient notes left hand trauma s/p falling out a chair. Denies head trauma and LOC. He endorses associated nausea, vomiting, diarrhea, general abdominal pain, fatigue, and visual disturbance. Patient notes compliance with insulin lispro and insulin detemir U-100. No medication PTA. No other exacerbating or alleviating factors. Patient denies fever, chills, headache, or other associated symptoms.     Patient currently denies any CP, AP or SOB.

## 2023-12-11 NOTE — ASSESSMENT & PLAN NOTE
Patient's COPD is controlled currently.    StableP  atient is currently off COPD Pathway.   Continue scheduled inhalers  prn  and monitor respiratory status closely.

## 2023-12-11 NOTE — CONSULTS
"[facility]  Psychiatry  Telepsychiatry Consult Note    Please see previous notes:    Patient agreeable to consultation via telepsychiatry.    Tele-Consultation from Psychiatry started: 12/11/2023 at 12:46 PM  The chief complaint leading to psychiatric consultation is: "oversedated on current antipsychotics"  This consultation was requested by Dr Mcmanus, the attending physician.  The location of the consulting psychiatrist is [place].  The patient location is  [facility] MEDICAL SURGICAL UNIT   The patient was admitted 12/10/2023  4:00 PM    Also present with the patient at the time of the consultation: mother    Patient Identification:   Cali Mabry is a 58 y.o. male.    Patient information was obtained from patient, past medical records, ER records, and primary team.  Patient presented voluntarily to the Emergency Department by ambulance where the patient received see Ambulance Run Sheet prior to arrival.    Inpatient consult to Telemedicine - Psych  Consult performed by: Jimena Laurent MD  Consult ordered by: Angela Mcmanus MD        Teleconsult Time Documentation  Subjective:     History of Present Illness:  Per MD progress note 12/11/23: "Principal Problem:Diabetic ketoacidosis without coma           HPI:     Cali Mabry is a 58 y.o. male who has a past medical history of Acute on chronic pancreatitis, Addiction to drug, CHF, DM type 1, HCV, HTN (hypertension), psychiatric care, IVDU (intravenous drug user), Pancreatitis, Psychiatric problem, Stroke, and Substance abuse, presented to the ED with CC of Generalized Fatigue.      Patient is unable to give much of a history. He says he takes his insulin regularly, but unable to give me even a guess as to how much he takes. Nurse states he told her he has not taken any insulin for the last couple days. He was BIB [facility] after he slid out of his chair and unable to get up. Given patient being a poor historian and no family at bedside, will " "put information gathered by ED:      Per chart review, patient was seen at [facility] on 11/[facility] for generalized weakness. Per chart review[facility]. The patient left AMA on both ED visits. Patient notes left hand trauma s/p falling out a chair. Denies head trauma and LOC. He endorses associated nausea, vomiting, diarrhea, general abdominal pain, fatigue, and visual disturbance. Patient notes compliance with insulin lispro and insulin detemir U-100. No medication PTA. No other exacerbating or alleviating factors. Patient denies fever, chills, headache, or other associated symptoms.      Patient currently denies any CP, AP or SOB.      Overview/Hospital Course:  No notes on file     Interval History: weaned off insulin drip around 2 a.m. Mother at bedside with multiple concerns. Says patient does not ambulate well. He fell at an outside facility hitting his face and hip. Has not been mobilizing well since. Also,  his psych meds are too strong. He sleeps all day and is not eating. Lastly, his Dexcom needs to be changed.      Review of Systems   Unable to perform ROS: Acuity of condition (patient asleep; not wanting to participate in interview)    "   Per RN 12/11/23: "Admitted pt from ER, disoriented to time and situation, Vital signs stable, Skin excoriation and tear on the scrotum, scab on bilateral anterior legs.  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 Chart reviewed, UDS neg. Pt known to interviewer from consult August 2023, pt did not recognize interviewer but was calm and cooperative on exam. Says has seen ACT team at home since discharge from hospital back in August. Denied any AE to medication regimen. Denied SI/HI/AVH, denied paranoia or " "depression. Mood is "ok." Feels hungry, was pulling at PICC line, redirected successfully by mother. Says she feels "fine" right now, asks if he missed taking his insulin, shrugs. Says unsure of month, told almost [place] and appeared excited, asked pt year said "no idea." Amenable to working with PT today, no complaints, asking for coffee. Mother at bedside without concern.     Collateral: [facility] ACT team [phone]--no answer    Per chart review with updates where applicable:  Psychiatric History:   Previous Psychiatric Hospitalizations: Yes multiple  Previous Medication Trials: Yes Depakote, Zyprexa, Thorazine, Suboxone, Valium, Vistaril, Seroquel, Cymbalta  Previous Suicide Attempts: no  History of Violence: yes  History of Depression: yes  History of Ila: no  History of Auditory/Visual Hallucination yes  History of Delusions: paranoia  Outpatient psychiatrist (current & past): Yes     Substance Abuse History:  Tobacco:Yes  Alcohol: Yes  Illicit Substances:Yes cocaine, heroin, amphetamines  Detox/Rehab: Yes     Legal History: Past charges/incarcerations: Yes DUI     Family Psychiatric History: MARU        Social History:  Developmental/Childhood:reports [place] age 11  *Education:Patient did not go to college but 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 [facility]   Employment Status/Finances:Unemployed   Relationship Status/Sexual Orientation: heterosexual  Children: 0  Housing Status: Home w mother   history:  NO  Access to gun: MARU  Shinto:[facility]  Recreational activities:[facility]    Psychiatric Mental Status Exam:  Arousal: alert  Sensorium/Orientation: oriented to person  Behavior/Cooperation: briefly cooperative   Speech: normal tone, normal rate, normal pitch, normal volume, profane  Language: grossly intact  Mood: " good "   Affect: irritable  Thought Process: goal-directed  Thought Content:   Auditory " hallucinations: NO  Visual hallucinations: MARU  Paranoia: MARU  Delusions:  MARU  Suicidal ideation: MARU  Homicidal ideation: MARU  Attention/Concentration:  intact  Memory: intact to conversation, MARU formally  Fund of Knowledge: MARU  Abstract reasoning: MARU  Insight: limited awareness of illness  Judgment: limited      Past Medical History:   Past Medical History:   Diagnosis Date    Acute on chronic pancreatitis 04/03/2016    Addiction to drug     CHF (congestive heart failure)     DKA (diabetic ketoacidoses) 02/2020    DM (diabetes mellitus), type 1     HCV (hepatitis C virus)     high VL     HTN (hypertension)     Hx of psychiatric care     Hypomagnesemia 05/25/2020    IVDU (intravenous drug user)     Heroin    Pancreatitis     Psychiatric problem     Stroke     Substance abuse       Laboratory Data:   Labs Reviewed   CBC W/ AUTO DIFFERENTIAL - Abnormal; Notable for the following components:       Result Value    Hemoglobin 12.1 (*)     Hematocrit 39.1 (*)     MCV 81 (*)     MCH 25.1 (*)     MCHC 30.9 (*)     Immature Granulocytes 2.0 (*)     Immature Grans (Abs) 0.17 (*)     Mono # 1.3 (*)     Lymph % 12.6 (*)     All other components within normal limits   COMPREHENSIVE METABOLIC PANEL - Abnormal; Notable for the following components:    Sodium 134 (*)     Potassium 5.8 (*)     CO2 19 (*)     Glucose 774 (*)     BUN 34 (*)     Creatinine 1.7 (*)     Total Protein 8.7 (*)     Albumin 3.4 (*)     Alkaline Phosphatase 136 (*)     eGFR 46 (*)     Anion Gap 20 (*)     All other components within normal limits    Narrative:       Glucose critical result(s) called and verbal readback obtained from   Teja Jaquez by JOHNNY 12/10/2023 17:01   BETA - HYDROXYBUTYRATE, SERUM - Abnormal; Notable for the following components:    Beta-Hydroxybutyrate 3.2 (*)     All other components within normal limits   URINALYSIS, REFLEX TO URINE CULTURE - Abnormal; Notable for the following components:    Color, UA Colorless (*)     Protein, UA  Trace (*)     Glucose, UA 4+ (*)     Ketones, UA 1+ (*)     Occult Blood UA Trace (*)     Leukocytes, UA Trace (*)     All other components within normal limits    Narrative:     Specimen Source->Urine   URINALYSIS MICROSCOPIC - Abnormal; Notable for the following components:    WBC, UA 12 (*)     All other components within normal limits    Narrative:     Specimen Source->Urine   BASIC METABOLIC PANEL - Abnormal; Notable for the following components:    Potassium 5.2 (*)     CO2 20 (*)     Glucose 568 (*)     BUN 29 (*)     Creatinine 1.5 (*)     Anion Gap 19 (*)     eGFR 54 (*)     All other components within normal limits    Narrative:      Glucose critical result(s) called and verbal readback obtained from   Edie Guillory by JOHNNY 12/10/2023 20:23   DRUG SCREEN PANEL, URINE EMERGENCY - Abnormal; Notable for the following components:    Creatinine, Urine 14.2 (*)     All other components within normal limits    Narrative:     Specimen Source->Urine   PHOSPHORUS - Abnormal; Notable for the following components:    Phosphorus 2.6 (*)     All other components within normal limits   BASIC METABOLIC PANEL - Abnormal; Notable for the following components:    Glucose 289 (*)     BUN 30 (*)     All other components within normal limits   ISTAT PROCEDURE - Abnormal; Notable for the following components:    POC PH 7.308 (*)     POC PCO2 55.0 (*)     POC PO2 24 (*)     All other components within normal limits   POCT GLUCOSE - Abnormal; Notable for the following components:    POCT Glucose >500 (*)     All other components within normal limits   POCT GLUCOSE - Abnormal; Notable for the following components:    POCT Glucose >500 (*)     All other components within normal limits   POCT GLUCOSE - Abnormal; Notable for the following components:    POCT Glucose 408 (*)     All other components within normal limits   POCT GLUCOSE - Abnormal; Notable for the following components:    POCT Glucose 374 (*)     All other components  within normal limits   POCT GLUCOSE - Abnormal; Notable for the following components:    POCT Glucose 308 (*)     All other components within normal limits   POCT GLUCOSE - Abnormal; Notable for the following components:    POCT Glucose 245 (*)     All other components within normal limits   POCT GLUCOSE - Abnormal; Notable for the following components:    POCT Glucose 184 (*)     All other components within normal limits   LIPASE   LIPASE   MAGNESIUM   PHOSPHORUS   PHOSPHORUS   MAGNESIUM   DRUG SCREEN PANEL, URINE EMERGENCY   MAGNESIUM   HEMOGLOBIN A1C   POCT GLUCOSE MONITORING CONTINUOUS       Neurological History:  Seizures: MARU  Head trauma: Yes    Allergies:   Review of patient's allergies indicates:  No Known Allergies    Medications in ER:   Medications   aspirin chewable tablet 81 mg (81 mg Oral Given 12/11/23 0826)   atorvastatin tablet 20 mg (20 mg Oral Given 12/11/23 0826)   carBAMazepine 12 hr tablet 200 mg (200 mg Oral Given 12/11/23 0826)   carBAMazepine 12 hr tablet 400 mg (400 mg Oral Not Given 12/10/23 2100)   clopidogreL tablet 75 mg (75 mg Oral Given 12/11/23 0826)   metoprolol succinate (TOPROL-XL) 24 hr tablet 25 mg (25 mg Oral Given 12/11/23 0826)   risperiDONE tablet 1 mg (1 mg Oral Given 12/11/23 0826)   venlafaxine 24 hr capsule 75 mg (75 mg Oral Given 12/11/23 0826)   ondansetron injection 4 mg (has no administration in time range)   acetaminophen tablet 650 mg (has no administration in time range)   sodium chloride 0.9% flush 10 mL (10 mLs Intravenous Given 12/11/23 0619)     And   sodium chloride 0.9% flush 10 mL (has no administration in time range)   insulin aspart U-100 pen 1-10 Units (has no administration in time range)   insulin aspart U-100 pen 4 Units (4 Units Subcutaneous Given 12/11/23 0828)   insulin detemir U-100 (Levemir) pen 15 Units (15 Units Subcutaneous Given 12/11/23 0223)   0.9%  NaCl infusion ( Intravenous New Bag 12/11/23 1133)   losartan tablet 25 mg (25 mg Oral  "Given 12/11/23 0826)   mupirocin 2 % ointment (has no administration in time range)   sodium chloride 0.9% bolus 1,000 mL 1,000 mL (0 mLs Intravenous Stopped 12/10/23 1724)   insulin detemir U-100 (Levemir) pen 17 Units (17 Units Subcutaneous Given 12/10/23 2005)   potassium chloride 20 mEq in 100 mL IVPB (FOR CENTRAL LINE ADMINISTRATION ONLY) (20 mEq Intravenous New Bag 12/11/23 0619)       Medications at home:   Current Discharge Medication List        CONTINUE these medications which have NOT CHANGED    Details   atorvastatin (LIPITOR) 20 MG tablet Take 1 tablet (20 mg total) by mouth once daily.  Qty: 90 tablet, Refills: 3      BD GERTRUDIS 2ND GEN PEN NEEDLE 32 gauge x 5/32" Ndle Inject into the skin 4 (four) times daily.      !! carBAMazepine (TEGRETOL XR) 200 MG 12 hr tablet Take 1 tablet (200 mg total) by mouth once daily.  Qty: 30 tablet, Refills: 3      TEEspy SEBASTIAN 2 SENSOR Kit USE FOUR TIMES PER WEEK AS DIRECTED      !! insulin detemir U-100, Levemir, 100 unit/mL (3 mL) SubQ InPn pen Inject 10 Units into the skin every evening.  Qty: 3 mL, Refills: 11      insulin lispro 100 unit/mL pen Inject into the skin.      levocetirizine (XYZAL) 5 MG tablet Take 5 mg by mouth every evening.      risperiDONE (RISPERDAL M-TABS) 1 MG TbDL Take 1 tablet (1 mg total) by mouth 2 (two) times daily.  Qty: 60 tablet, Refills: 3      albuterol (PROVENTIL/VENTOLIN HFA) 90 mcg/actuation inhaler Inhale 1-2 puffs into the lungs every 6 (six) hours as needed for Wheezing. Rescue  Qty: 18 g, Refills: 0      aspirin 81 MG Chew Take 1 tablet (81 mg total) by mouth once daily.  Qty: 90 tablet, Refills: 3      !! carBAMazepine (TEGRETOL XR) 400 MG Tb12 Take 1 tablet (400 mg total) by mouth every evening.  Qty: 30 tablet, Refills: 3      cetirizine (ZYRTEC) 10 MG tablet Take 1 tablet (10 mg total) by mouth once daily.  Qty: 30 tablet, Refills: 0    Associated Diagnoses: Viral URI with cough      clopidogreL (PLAVIX) 75 mg tablet Take 1 " tablet (75 mg total) by mouth once daily.  Qty: 30 tablet, Refills: 0      !! insulin detemir U-100, Levemir, 100 unit/mL (3 mL) SubQ InPn pen Inject 17 Units into the skin once daily.  Qty: 5.1 mL, Refills: 11      loratadine (CLARITIN) 10 mg tablet Take 10 mg by mouth.    Associated Diagnoses: History of hepatitis C      metoprolol succinate (TOPROL-XL) 25 MG 24 hr tablet Take 1 tablet (25 mg total) by mouth once daily.  Qty: 30 tablet, Refills: 11    Comments: .      naloxone (NARCAN) 4 mg/actuation Spry 4mg by nasal route as needed for opioid overdose; may repeat every 2-3 minutes in alternating nostrils until medical help arrives. Call 911  Qty: 1 each, Refills: 11      ONETOUCH DELICA PLUS LANCET 33 gauge Misc Apply topically 4 (four) times daily.      ONETOUCH ULTRA TEST Strp USE TO TEST BLOOD SUGAR FOUR TIMES DAILY      ONETOUCH ULTRA2 METER Misc 4 (four) times daily.      venlafaxine (EFFEXOR-XR) 75 MG 24 hr capsule Take 1 capsule (75 mg total) by mouth once daily.  Qty: 30 capsule, Refills: 3       !! - Potential duplicate medications found. Please discuss with provider.              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 Discussed safety concerns and precautions. Also informed pt to return to ED for any worsening of psychiatric symptoms or any SI/HI/AVH.    PSYCHIATRIC MEDICATIONS  Suspect sedation 2/2 hypoactive delirium. If pt continues to appear excessively sedated after blood glucose has normalized can hold AM dose of Risperdal.     LEGAL/DISPOSITION:  Pt currently does not meet PEC criteria nor benefit from from involuntary inpatient psychiatric admission. Should  f/u with outpatient psychiatry upon discharge, see below.     OTHER  Please have CM/SW assist patient with pt's outpatient mental health team [facility] ACT team [phone] for psychiatric f/u s/p discharge from this facility   Patient was instructed to call 911 and return to the nearest ED if they begin feeling suicidal, homicidal, or gravely disabled due to a mental illness      Total time including chart review, time with patient, obtaining collateral info[if necessary/possible]: 100        More than 50% of the time was spent counseling/coordinating care    Consulting clinician was informed of the encounter and consult note.    Consultation ended: 12/11/2023 at 1:50 PM      Jimena Laurent MD   Psychiatry  [facility]

## 2023-12-11 NOTE — PLAN OF CARE
Problem: Adult Inpatient Plan of Care  Goal: Patient-Specific Goal (Individualized)  Outcome: Ongoing, Progressing  Goal: Absence of Hospital-Acquired Illness or Injury  Outcome: Ongoing, Progressing  Goal: Optimal Comfort and Wellbeing  Outcome: Ongoing, Progressing     Problem: Diabetes Comorbidity  Goal: Blood Glucose Level Within Targeted Range  Outcome: Ongoing, Progressing     Problem: Infection  Goal: Absence of Infection Signs and Symptoms  Outcome: Ongoing, Progressing

## 2023-12-11 NOTE — NURSING
[facility] [place]  Nurses Note -- 4 Eyes      12/11/2023       Skin assessed on: Q Shift      [x] No Pressure Injuries Present    []Prevention Measures Documented    [] Yes LDA  for Pressure Injury Previously documented     [] Yes New Pressure Injury Discovered   [] LDA for New Pressure Injury Added      Attending RN:  Cem Clarke, RN     Second RN:  Ksenia Cuba RN

## 2023-12-11 NOTE — PLAN OF CARE
Problem: Diabetes Comorbidity  Goal: Blood Glucose Level Within Targeted Range  Outcome: Ongoing, Progressing     Problem: Skin Injury Risk Increased  Goal: Skin Health and Integrity  Outcome: Ongoing, Progressing     Problem: Impaired Wound Healing  Goal: Optimal Wound Healing  Outcome: Ongoing, Progressing

## 2023-12-11 NOTE — NURSING
Admitted pt from ER, disoriented to time and situation, Vital signs stable, Skin excoriation and tear on the scrotum, scab on bilateral anterior legs. No complaints of N/V,  nor pain. Bed in low position, wheels locked. Side rails up. Call light within reach.

## 2023-12-11 NOTE — SUBJECTIVE & OBJECTIVE
Interval History: weaned off insulin drip around 2 a.m. Mother at bedside with multiple concerns. Says patient does not ambulate well. He fell at an outside facility hitting his face and hip. Has not been mobilizing well since. Also,  his psych meds are too strong. He sleeps all day and is not eating. Lastly, his Dexcom needs to be changed.     Review of Systems   Unable to perform ROS: Acuity of condition (patient asleep; not wanting to participate in interview)     Objective:     Vital Signs (Most Recent):  Temp: 98.7 °F (37.1 °C) (12/11/23 0758)  Pulse: 87 (12/11/23 0758)  Resp: 19 (12/11/23 0758)  BP: (!) 140/76 (12/11/23 0758)  SpO2: 96 % (12/11/23 0758) Vital Signs (24h Range):  Temp:  [97.7 °F (36.5 °C)-98.7 °F (37.1 °C)] 98.7 °F (37.1 °C)  Pulse:  [83-98] 87  Resp:  [16-24] 19  SpO2:  [94 %-98 %] 96 %  BP: (136-179)/(75-90) 140/76     Weight: 60.9 kg (134 lb 4.2 oz)  Body mass index is 19.26 kg/m².    Intake/Output Summary (Last 24 hours) at 12/11/2023 1041  Last data filed at 12/10/2023 2002  Gross per 24 hour   Intake 50 ml   Output --   Net 50 ml         Physical Exam  Vitals and nursing note reviewed.   Constitutional:       General: He is sleeping. He is not in acute distress.     Appearance: He is well-developed.   HENT:      Head: Normocephalic and atraumatic.   Eyes:      Conjunctiva/sclera: Conjunctivae normal.   Neck:      Vascular: No JVD.   Cardiovascular:      Rate and Rhythm: Normal rate and regular rhythm.      Heart sounds: Normal heart sounds.   Pulmonary:      Effort: Pulmonary effort is normal.      Breath sounds: Normal breath sounds.   Abdominal:      General: Bowel sounds are normal. There is no distension.      Palpations: Abdomen is soft.      Tenderness: There is no abdominal tenderness.   Musculoskeletal:      Cervical back: Neck supple.      Right lower leg: No edema.      Left lower leg: No edema.      Comments: +PICC   Neurological:      Mental Status: He is easily aroused. He is  lethargic.   Psychiatric:         Attention and Perception: He is inattentive.         Speech: He is noncommunicative.         Behavior: Behavior is withdrawn.      Comments: Keeps eyes closed. Will answer a q or 2 when shaken and asked             Significant Labs: All pertinent labs within the past 24 hours have been reviewed.  BMP:   Recent Labs   Lab 12/11/23  0016   *      K 3.7      CO2 27   BUN 30*   CREATININE 1.2   CALCIUM 9.7   MG 2.4     CBC:   Recent Labs   Lab 12/10/23  1619   WBC 8.70   HGB 12.1*   HCT 39.1*          Significant Imaging: I have reviewed all pertinent imaging results/findings within the past 24 hours.

## 2023-12-11 NOTE — PT/OT/SLP EVAL
Occupational Therapy   Evaluation    Name: Cali Mabry  MRN: [id number]  Admitting Diagnosis: Diabetic ketoacidosis without coma  Recent Surgery: * No surgery found *      Recommendations:     Discharge Recommendations: Moderate Intensity Therapy  Discharge Equipment Recommendations:   (TBD)  Barriers to discharge:   (not at PLOF; high risk of falls)    Assessment:     Cali Mabry is a 58 y.o. male with a medical diagnosis of Diabetic ketoacidosis without coma. Performance deficits affecting function: weakness, impaired endurance, decreased ROM, decreased coordination, decreased upper extremity function, impaired self care skills, impaired functional mobility, decreased lower extremity function, decreased safety awareness, impaired balance.      Total A supine<>sit. MOD A for sit balance at EOB. Inconsistent efforts with assessments. Pt calm during session.     Rehab Prognosis: Fair; patient would benefit from acute skilled OT services to address these deficits and reach maximum level of function.       Plan:     Patient to be seen  (3-5x/week) to address the above listed problems via self-care/home management, therapeutic activities, therapeutic exercises  Plan of Care Expires: 12/25/23  Plan of Care Reviewed with: patient    Subjective     Chief Complaint: weak   Patient/Family Comments/goals: agreeable to session     Occupational Profile:  Living Environment: pt lives with his mother, niece, and nephew in a H with 0 GRAY.   Previous level of function: MOD I with ADLs and mobility using straight cane.   Roles and Routines: does not drive   Equipment Used at Home: cane, straight  Assistance upon Discharge: family     Pain/Comfort:  Pain Rating 1: 0/10    Patients cultural, spiritual, Mu-ism conflicts given the current situation: no    Objective:     Communicated with: nurseHalley, prior to session.  Patient found  HOB elevated with pillow under R side  with bed alarm, telemetry, PICC line upon OT  entry to room.    General Precautions: Standard, fall, diabetic, vision impaired  Orthopedic Precautions: N/A  Braces: N/A  Respiratory Status: Room air    Occupational Performance:    Bed Mobility:  pt with poor initiation.   Patient completed Scooting anteriorly and laterally with total assistance and 2 persons  Patient completed Supine to Sit with total assistance and 2 persons  Patient completed Sit to Supine with total assistance and 2 persons  Patient completed Scooting in supine with total assistance and 2 persons     Activities of Daily Living:  Feeding:  minimum assistance with HOB elevated to reach, grasp, and hold pitcher to take sips of water then pt returned pitcher to the tray table    Cognitive/Visual Perceptual:  Cognitive/Psychosocial Skills:     -       Follows Commands/attention:follows some simple commands   -       Communication: clear/fluent  -       Memory: Poor immediate recall  -       Safety awareness/insight to disability: impaired   -       Mood/Affect/Coping skills/emotional control: calm, some odd behavior   Visual/Perceptual:      -Impaired  R/L discrimination  -pt did not complete visual field assessments; pt known from previous admit (08/2023) with L visual field deficits       Physical Exam:  Balance:    -       seated: MOD A   Postural examination/scapula alignment:    -       Rounded shoulders  -       Forward head  Skin integrity: dried scabs to B/L LE   Edema:  no BUE edema noted   Sensation:    -       Intact  light/touch BUE  Dominant hand:    -       Right   Upper Extremity Range of Motion:     -       Right Upper Extremity: pt resisting PROM, but able to complete ~partial range for feeding   -       Left Upper Extremity: PROM WFL; pt inconsistent/not completing AROM trials   Upper Extremity Strength:    -       R/L Upper Extremity: pt could not complete MMT assessments; observes to be impaired/weak through functional observation    Strength:    -       Right Upper  Extremity: WFL  -       Left Upper Extremity: WFL  Fine Motor Coordination:    -       Impaired  Left hand, manipulation of objects   and Right hand, manipulation of objects    Gross motor coordination:   impaired     AMPAC 6 Click ADL:  AMPAC Total Score: 12    Treatment & Education:  Pt educated on OT role/POC.   Importance of OOB activity with therapy assistance.  Safety during functional t/f and mobility   Multiple self-care tasks/functional mobility attempted/completed- assistance level noted above   All questions/concerns answered within OT scope of practice       Patient left  HOB elevated L sidelying with B/L heels offloaded by pillow  with all lines intact, call button in reach, bed alarm on, and lights on, blinds opened, tv on, tray table next to pt, and door left open.     GOALS:   Multidisciplinary Problems       Occupational Therapy Goals          Problem: Occupational Therapy    Goal Priority Disciplines Outcome Interventions   Occupational Therapy Goal     OT, PT/OT Ongoing, Progressing    Description: Goals to be met by: 12/25/23     Patient will increase functional independence with ADLs by performing:    Feeding with Modified [place].  UE Dressing with Modified [place].  LE Dressing with Stand-by Assistance.  Grooming while seated with Supervision.  Supine to sit with Supervision.  Upper extremity exercise program x10 reps per handout, with supervision.    Step transfer- to be determined pending further pt participation   Toilet transfer to bedside commode - to be determined pending further pt participation   Toileting from bedside commode for hygiene and clothing management- to be determined pending further pt participation                          History:     Past Medical History:   Diagnosis Date    Acute on chronic pancreatitis 04/03/2016    Addiction to drug     CHF (congestive heart failure)     DKA (diabetic ketoacidoses) 02/2020    DM (diabetes mellitus), type 1     HCV  (hepatitis C virus)     high VL     HTN (hypertension)     Hx of psychiatric care     Hypomagnesemia 05/25/2020    IVDU (intravenous drug user)     Heroin    Pancreatitis     Psychiatric problem     Stroke     Substance abuse          Past Surgical History:   Procedure Laterality Date    CARDIAC SURGERY  1999    stent placed. ([facility])    ESOPHAGOGASTRODUODENOSCOPY N/A 3/5/2020    Gastritis.  No H pylori.  Completed PPI for 1 month.  Procedure: EGD (ESOPHAGOGASTRODUODENOSCOPY);  Surgeon: Brinda Rene MD;  Location: [facility] ENDO;  Service: Endoscopy;  Laterality: N/A;  W421 A; [id number]    LEFT HEART CATHETERIZATION Left 9/28/2020    Procedure: Left heart cath;  Surgeon: Fito Rangel MD;  Location: [facility] CATH LAB;  Service: Cardiology;  Laterality: Left;    SHOULDER SURGERY  2013    right shoulder, spur removal.       Time Tracking:     OT Date of Treatment: 12/11/23  OT Start Time: 1427  OT Stop Time: 1441  OT Total Time (min): 14 min    Billable Minutes:Evaluation 14 min (co-eval with PT)    12/11/2023

## 2023-12-11 NOTE — CONSULTS
Nutrition-Related Diabetes Education      Learners: Domenic Mabry    Current HbA1c:   Hemoglobin A1C   Date Value Ref Range Status   08/23/2023 9.2 (H) 4.0 - 5.6 % Final     Comment:     ADA Screening Guidelines:  5.7-6.4%  Consistent with prediabetes  >or=6.5%  Consistent with diabetes    High levels of fetal hemoglobin interfere with the HbA1C  assay. Heterozygous hemoglobin variants (HbS, HgC, etc)do  not significantly interfere with this assay.   However, presence of multiple variants may affect accuracy.     08/16/2023 9.5 (H) 4.0 - 5.6 % Final     Comment:     ADA Screening Guidelines:  5.7-6.4%  Consistent with prediabetes  >or=6.5%  Consistent with diabetes    High levels of fetal hemoglobin interfere with the HbA1C  assay. Heterozygous hemoglobin variants (HbS, HgC, etc)do  not significantly interfere with this assay.   However, presence of multiple variants may affect accuracy.     01/27/2023 10.4 (H) 4.0 - 5.6 % Final     Comment:     ADA Screening Guidelines:  5.7-6.4%  Consistent with prediabetes  >or=6.5%  Consistent with diabetes    High levels of fetal hemoglobin interfere with the HbA1C  assay. Heterozygous hemoglobin variants (HbS, HgC, etc)do  not significantly interfere with this assay.   However, presence of multiple variants may affect accuracy.         Home diabetes medication(s):   Diabetes Medications               insulin detemir U-100, Levemir, 100 unit/mL (3 mL) SubQ InPn pen Inject 10 Units into the skin every evening.    insulin detemir U-100, Levemir, 100 unit/mL (3 mL) SubQ InPn pen Inject 17 Units into the skin once daily.    insulin lispro 100 unit/mL pen Inject into the skin.             Nutrition Education with handouts: Diabetes and Diet- Carbohydrate Controlled Diet - Diet and Health - Carbohydrate Counting    Comments: RD consult for education on DM diet. Pt states he does not want to be educated on diabetic diet. He allowed me to leave educations in the room.    Please  consult as needed.  Thank you!     Edda Loving, Registration Eligible, Provisional LDN

## 2023-12-11 NOTE — NURSING
I contacted patient's mother to obtain consent for PICC line. The patient's mother requested that I contact the patient's sister, Amarilis, to ensure she was okay with PICC placement. Pt unable to make decisions at this time. I spoke with Amarilis who was agreeable to me placing PICC line.

## 2023-12-12 PROBLEM — N30.00 ACUTE CYSTITIS WITHOUT HEMATURIA: Status: ACTIVE | Noted: 2023-12-12

## 2023-12-12 LAB
ALBUMIN SERPL BCP-MCNC: 2.4 G/DL (ref 3.5–5.2)
ALP SERPL-CCNC: 87 U/L (ref 55–135)
ALT SERPL W/O P-5'-P-CCNC: 16 U/L (ref 10–44)
ANION GAP SERPL CALC-SCNC: 9 MMOL/L (ref 8–16)
AST SERPL-CCNC: 17 U/L (ref 10–40)
BACTERIA UR CULT: ABNORMAL
BASOPHILS # BLD AUTO: 0.02 K/UL (ref 0–0.2)
BASOPHILS NFR BLD: 0.4 % (ref 0–1.9)
BILIRUB SERPL-MCNC: 0.3 MG/DL (ref 0.1–1)
BUN SERPL-MCNC: 17 MG/DL (ref 6–20)
CALCIUM SERPL-MCNC: 8.5 MG/DL (ref 8.7–10.5)
CHLORIDE SERPL-SCNC: 107 MMOL/L (ref 95–110)
CO2 SERPL-SCNC: 25 MMOL/L (ref 23–29)
CREAT SERPL-MCNC: 0.8 MG/DL (ref 0.5–1.4)
DIFFERENTIAL METHOD: ABNORMAL
EOSINOPHIL # BLD AUTO: 0.3 K/UL (ref 0–0.5)
EOSINOPHIL NFR BLD: 4.9 % (ref 0–8)
ERYTHROCYTE [DISTWIDTH] IN BLOOD BY AUTOMATED COUNT: 13.7 % (ref 11.5–14.5)
EST. GFR  (NO RACE VARIABLE): >60 ML/MIN/1.73 M^2
GLUCOSE SERPL-MCNC: 105 MG/DL (ref 70–110)
HCT VFR BLD AUTO: 30.8 % (ref 40–54)
HGB BLD-MCNC: 9.2 G/DL (ref 14–18)
IMM GRANULOCYTES # BLD AUTO: 0.08 K/UL (ref 0–0.04)
IMM GRANULOCYTES NFR BLD AUTO: 1.4 % (ref 0–0.5)
LYMPHOCYTES # BLD AUTO: 1 K/UL (ref 1–4.8)
LYMPHOCYTES NFR BLD: 17.8 % (ref 18–48)
MAGNESIUM SERPL-MCNC: 2 MG/DL (ref 1.6–2.6)
MCH RBC QN AUTO: 24.3 PG (ref 27–31)
MCHC RBC AUTO-ENTMCNC: 29.9 G/DL (ref 32–36)
MCV RBC AUTO: 81 FL (ref 82–98)
MONOCYTES # BLD AUTO: 0.7 K/UL (ref 0.3–1)
MONOCYTES NFR BLD: 11.5 % (ref 4–15)
NEUTROPHILS # BLD AUTO: 3.6 K/UL (ref 1.8–7.7)
NEUTROPHILS NFR BLD: 64 % (ref 38–73)
NRBC BLD-RTO: 0 /100 WBC
PHOSPHATE SERPL-MCNC: 2.7 MG/DL (ref 2.7–4.5)
PLATELET # BLD AUTO: 325 K/UL (ref 150–450)
PMV BLD AUTO: 8.9 FL (ref 9.2–12.9)
POCT GLUCOSE: 102 MG/DL (ref 70–110)
POCT GLUCOSE: 249 MG/DL (ref 70–110)
POCT GLUCOSE: 291 MG/DL (ref 70–110)
POTASSIUM SERPL-SCNC: 3.6 MMOL/L (ref 3.5–5.1)
PROT SERPL-MCNC: 6.3 G/DL (ref 6–8.4)
RBC # BLD AUTO: 3.79 M/UL (ref 4.6–6.2)
SODIUM SERPL-SCNC: 141 MMOL/L (ref 136–145)
WBC # BLD AUTO: 5.66 K/UL (ref 3.9–12.7)

## 2023-12-12 PROCEDURE — 11000001 HC ACUTE MED/SURG PRIVATE ROOM

## 2023-12-12 PROCEDURE — 97110 THERAPEUTIC EXERCISES: CPT

## 2023-12-12 PROCEDURE — 85025 COMPLETE CBC W/AUTO DIFF WBC: CPT | Performed by: STUDENT IN AN ORGANIZED HEALTH CARE EDUCATION/TRAINING PROGRAM

## 2023-12-12 PROCEDURE — 84100 ASSAY OF PHOSPHORUS: CPT | Performed by: STUDENT IN AN ORGANIZED HEALTH CARE EDUCATION/TRAINING PROGRAM

## 2023-12-12 PROCEDURE — 97535 SELF CARE MNGMENT TRAINING: CPT

## 2023-12-12 PROCEDURE — 83735 ASSAY OF MAGNESIUM: CPT | Performed by: STUDENT IN AN ORGANIZED HEALTH CARE EDUCATION/TRAINING PROGRAM

## 2023-12-12 PROCEDURE — 63600175 PHARM REV CODE 636 W HCPCS: Performed by: INTERNAL MEDICINE

## 2023-12-12 PROCEDURE — 25000003 PHARM REV CODE 250: Performed by: FAMILY MEDICINE

## 2023-12-12 PROCEDURE — 80053 COMPREHEN METABOLIC PANEL: CPT | Performed by: STUDENT IN AN ORGANIZED HEALTH CARE EDUCATION/TRAINING PROGRAM

## 2023-12-12 PROCEDURE — 25000003 PHARM REV CODE 250: Performed by: HOSPITALIST

## 2023-12-12 PROCEDURE — 63600175 PHARM REV CODE 636 W HCPCS: Performed by: FAMILY MEDICINE

## 2023-12-12 PROCEDURE — A4216 STERILE WATER/SALINE, 10 ML: HCPCS | Performed by: HOSPITALIST

## 2023-12-12 PROCEDURE — 25000003 PHARM REV CODE 250: Performed by: INTERNAL MEDICINE

## 2023-12-12 PROCEDURE — 25000003 PHARM REV CODE 250: Performed by: STUDENT IN AN ORGANIZED HEALTH CARE EDUCATION/TRAINING PROGRAM

## 2023-12-12 RX ADMIN — Medication 10 ML: at 12:12

## 2023-12-12 RX ADMIN — MUPIROCIN: 20 OINTMENT TOPICAL at 09:12

## 2023-12-12 RX ADMIN — CARBAMAZEPINE 200 MG: 100 TABLET, EXTENDED RELEASE ORAL at 08:12

## 2023-12-12 RX ADMIN — METOPROLOL SUCCINATE ER TABLETS 25 MG: 25 TABLET, FILM COATED, EXTENDED RELEASE ORAL at 08:12

## 2023-12-12 RX ADMIN — RISPERIDONE 1 MG: 1 TABLET ORAL at 08:12

## 2023-12-12 RX ADMIN — Medication 10 ML: at 06:12

## 2023-12-12 RX ADMIN — Medication 10 ML: at 05:12

## 2023-12-12 RX ADMIN — LOSARTAN POTASSIUM 25 MG: 25 TABLET, FILM COATED ORAL at 08:12

## 2023-12-12 RX ADMIN — CEFTRIAXONE 1 G: 1 INJECTION, POWDER, FOR SOLUTION INTRAMUSCULAR; INTRAVENOUS at 08:12

## 2023-12-12 RX ADMIN — INSULIN ASPART 6 UNITS: 100 INJECTION, SOLUTION INTRAVENOUS; SUBCUTANEOUS at 05:12

## 2023-12-12 RX ADMIN — INSULIN ASPART 4 UNITS: 100 INJECTION, SOLUTION INTRAVENOUS; SUBCUTANEOUS at 08:12

## 2023-12-12 RX ADMIN — VENLAFAXINE HYDROCHLORIDE 75 MG: 75 CAPSULE, EXTENDED RELEASE ORAL at 08:12

## 2023-12-12 RX ADMIN — CLOPIDOGREL BISULFATE 75 MG: 75 TABLET ORAL at 08:12

## 2023-12-12 RX ADMIN — INSULIN ASPART 4 UNITS: 100 INJECTION, SOLUTION INTRAVENOUS; SUBCUTANEOUS at 05:12

## 2023-12-12 RX ADMIN — ASPIRIN 81 MG CHEWABLE TABLET 81 MG: 81 TABLET CHEWABLE at 08:12

## 2023-12-12 RX ADMIN — CARBAMAZEPINE 400 MG: 100 TABLET, EXTENDED RELEASE ORAL at 08:12

## 2023-12-12 RX ADMIN — INSULIN ASPART 2 UNITS: 100 INJECTION, SOLUTION INTRAVENOUS; SUBCUTANEOUS at 09:12

## 2023-12-12 RX ADMIN — INSULIN DETEMIR 15 UNITS: 100 INJECTION, SOLUTION SUBCUTANEOUS at 08:12

## 2023-12-12 RX ADMIN — MUPIROCIN: 20 OINTMENT TOPICAL at 08:12

## 2023-12-12 RX ADMIN — ATORVASTATIN CALCIUM 20 MG: 10 TABLET, FILM COATED ORAL at 08:12

## 2023-12-12 NOTE — NURSING
Nurses Note -- 4 Eyes      12/12/2023   9:21 AM      Skin assessed during: Q Shift Change      [] No Altered Skin Integrity Present    []Prevention Measures Documented      [x] Yes- Altered Skin Integrity Present or Discovered   [] LDA Added if Not in Epic (Describe Wound)   [] New Altered Skin Integrity was Present on Admit and Documented in LDA   [] Wound Image Taken    Wound Care Consulted? No    Attending Nurse:  Halley Mackenzie RN/Staff Member:   RITA De La Vega    Scrotal skin tear/excoriation and scabs to bilateral shins

## 2023-12-12 NOTE — PT/OT/SLP PROGRESS
Occupational Therapy   Treatment    Name: Cali Mabry  MRN: [id number]  Admitting Diagnosis:  Diabetic ketoacidosis without coma       Recommendations:     Discharge Recommendations: Moderate Intensity Therapy  Discharge Equipment Recommendations:   (TBD)  Barriers to discharge:   (not at PLOF; high risk of falls)    Assessment:     Cali Mabry is a 58 y.o. male with a medical diagnosis of Diabetic ketoacidosis without coma. Performance deficits affecting function are weakness, impaired endurance, impaired cognition, decreased ROM, decreased coordination, visual deficits, decreased upper extremity function, impaired self care skills, impaired fine motor, decreased lower extremity function, impaired functional mobility, gait instability, impaired skin, decreased safety awareness, impaired balance.     Rehab Prognosis:   Fair/+ ; patient would benefit from acute skilled OT services to address these deficits and reach maximum level of function.       Plan:     Patient to be seen  (3-5x/week) to address the above listed problems via self-care/home management, therapeutic activities, therapeutic exercises  Plan of Care Expires: 12/25/23  Plan of Care Reviewed with: patient    Subjective     Chief Complaint: weak   Patient/Family Comments/goals: wanting to get to the chair     Pain/Comfort:  Pain Rating 1: 0/10    Objective:     Communicated with: nursing prior to session.  Patient found  HOB elevated with pillow under R side   with bed alarm, PICC line, telemetry upon OT entry to room.    General Precautions: Standard, diabetic, fall, vision impaired    Orthopedic Precautions:N/A  Braces: N/A  Respiratory Status: Room air     Occupational Performance:     Bed Mobility:    Patient completed Rolling/Turning to Left with  MIN-MOD A with side rail   Patient completed Rolling/Turning to Right with MIN-MOD A with side rail  Patient completed Scooting in supine to HOB with total assistance and 2 persons  Patient  completed Supine to Sit with minimum assistance, with side rail, and HOB elevated   Patient completed Sit to Supine with moderate assistance   Patient completed Bridging 2x with minimum assistance     Activities of Daily Living:  Grooming: minimum assistance with combing hair using RUE at bed level     Upper Body Dressing: maximal assistance to doff soiled gown and don clean gown at bed level; impaired visual scanning to the L to locate sleeve; difficulty following short cueing to thread UE through sleeve after set-up and assist   Lower Body Dressing: total A to doff soiled brief and don clean one while pt assisted by bridging and rolling as described above     Toileting: max cueing and assist for hand placement for pt to complete pericare at bed level; max assist required         [facility] 6 Click ADL: 12    Treatment & Education:  Pt re-educated on OT role/POC.   Importance of OOB activity with staff assistance.  Safety during functional t/f and mobility   OT prompted pt to complete visual scanning to targets and cervical rotation to the L with great difficulty observed. Gentle tactile cueing and pt able to complete ~partial ROM. Pt practiced this 2x. Encouraged pt to continue this exercise  Multiple self-care tasks/functional mobility completed- assistance level noted above   All questions/concerns answered within OT scope of practice   Session abbreviated d/t transport present to take pt THAI for imaging.       Patient left  HOB elevated with pillow under L side and pillow offloading B/L heels   with all lines intact, call button in reach, bed alarm on, transport and nurse notified, and all needs met/within reach; lights on, blinds opened, tv on, door left open.     GOALS:   Multidisciplinary Problems       Occupational Therapy Goals          Problem: Occupational Therapy    Goal Priority Disciplines Outcome Interventions   Occupational Therapy Goal     OT, PT/OT Ongoing, Progressing    Description: Goals to be met  by: 12/25/23     Patient will increase functional independence with ADLs by performing:    Feeding with Modified [place].  UE Dressing with Modified [place].  LE Dressing with Stand-by Assistance.  Grooming while seated with Supervision.  Supine to sit with Supervision.  Upper extremity exercise program x10 reps per handout, with supervision.    Step transfer- to be determined pending further pt participation   Toilet transfer to bedside commode - to be determined pending further pt participation   Toileting from bedside commode for hygiene and clothing management- to be determined pending further pt participation                          Time Tracking:     OT Date of Treatment: 12/12/23  OT Start Time: 1336  OT Stop Time: 1352  OT Total Time (min): 16 min    Billable Minutes:Self Care/Home Management 16 min (co-treat with PT)     OT/LEONCIO: OT     Number of LEONCIO visits since last OT visit: 0    12/12/2023

## 2023-12-12 NOTE — PROGRESS NOTES
[facility] Medicine  Progress Note    Patient Name: Cali Mabry  MRN: [id number]  Patient Class: IP- Inpatient   Admission Date: 12/10/2023  Length of Stay: 2 days  Attending Physician: Jimmy Ibarra MD  Primary Care Provider: Lawanda Flores NP        Subjective:     Principal Problem:Diabetic ketoacidosis without coma        HPI:    Cali Mabry is a 58 y.o. male who has a past medical history of Acute on chronic pancreatitis, Addiction to drug, CHF, DM type 1, HCV, HTN (hypertension), psychiatric care, IVDU (intravenous drug user), Pancreatitis, Psychiatric problem, Stroke, and Substance abuse, presented to the ED with CC of Generalized Fatigue.     Patient is unable to give much of a history. He says he takes his insulin regularly, but unable to give me even a guess as to how much he takes. Nurse states he told her he has not taken any insulin for the last couple days. He was BIB [facility] after he slid out of his chair and unable to get up. Given patient being a poor historian and no family at bedside, will put information gathered by ED:     Per chart review, patient was seen at [facility] on 11/05 for generalized weakness. Per chart review, patient was seen at [facility] on 11/07 for generalized weakness. The patient left AMA on both ED visits. Patient notes left hand trauma s/p falling out a chair. Denies head trauma and LOC. He endorses associated nausea, vomiting, diarrhea, general abdominal pain, fatigue, and visual disturbance. Patient notes compliance with insulin lispro and insulin detemir U-100. No medication PTA. No other exacerbating or alleviating factors. Patient denies fever, chills, headache, or other associated symptoms.     Patient currently denies any CP, AP or SOB.     Overview/Hospital Course:  Mr. Mabry is a pleasant 58 old gentleman with a past history TBI secondary to bipolar disorder, CHF, diabetes, hepatitis-C, hypertension, history of polysubstance abuse  including IVDU was admitted to the hospital for DKA.  DKA resolved insulin drip protocol.  Patient is seen by psychiatry services and recommended to follow up with outpatient psychiatric clinic, no need for pec or inpatient psychiatric admission at this time.   to assist with setting up outpatient follow up appointment with Psychiatry.    Interval History:  Patient seen today care.  Patient reports no chest pain or shortness of breath.  He does have a known history of TBI (traumatic brain injury) with cognitive impairment.  Patient seen by Psychiatry, input appreciated.  Patient not recommended for pec or psychiatric inpatient admission.  Recommended to follow up with outpatient psychiatric clinic.  Patient treated for DKA with insulin protocol improved.  Urine culture growing Proteus, pansensitive.  Discussed checking CT abdomen and pelvis without contrast.  Patient's chest x-ray showed no acute changes.    Review of Systems   Constitutional: Negative.    HENT: Negative.     Eyes: Negative.    Respiratory: Negative.     Cardiovascular: Negative.    Gastrointestinal: Negative.    Endocrine: Negative.    Genitourinary: Negative.    Musculoskeletal: Negative.    Skin: Negative.    Allergic/Immunologic: Negative.    Neurological: Negative.    Hematological: Negative.    Psychiatric/Behavioral: Negative.       Objective:     Vital Signs (Most Recent):  Temp: 97.8 °F (36.6 °C) (12/12/23 0727)  Pulse: 83 (12/12/23 0727)  Resp: 18 (12/12/23 0727)  BP: (!) 141/73 (12/12/23 0727)  SpO2: (!) 94 % (12/12/23 0727) Vital Signs (24h Range):  Temp:  [97.1 °F (36.2 °C)-98.9 °F (37.2 °C)] 97.8 °F (36.6 °C)  Pulse:  [72-89] 83  Resp:  [17-20] 18  SpO2:  [93 %-100 %] 94 %  BP: (114-144)/(60-76) 141/73     Weight: 60.9 kg (134 lb 4.2 oz)  Body mass index is 19.26 kg/m².    Intake/Output Summary (Last 24 hours) at 12/12/2023 0752  Last data filed at 12/12/2023 0128  Gross per 24 hour   Intake 480 ml   Output --   Net 480 ml  "        Physical Exam  Vitals reviewed.   Constitutional:       Appearance: Normal appearance.   HENT:      Head: Normocephalic and atraumatic.      Right Ear: External ear normal.      Left Ear: External ear normal.      Nose: Nose normal.      Mouth/Throat:      Mouth: Mucous membranes are moist.      Pharynx: Oropharynx is clear.   Eyes:      Extraocular Movements: Extraocular movements intact.      Conjunctiva/sclera: Conjunctivae normal.      Pupils: Pupils are equal, round, and reactive to light.   Cardiovascular:      Rate and Rhythm: Normal rate and regular rhythm.      Pulses: Normal pulses.      Heart sounds: Normal heart sounds.   Pulmonary:      Effort: Pulmonary effort is normal.      Breath sounds: Normal breath sounds.   Abdominal:      General: Abdomen is flat. Bowel sounds are normal.      Palpations: Abdomen is soft.   Musculoskeletal:         General: Normal range of motion.      Cervical back: Normal range of motion and neck supple.   Skin:     General: Skin is warm.      Capillary Refill: Capillary refill takes less than 2 seconds.   Neurological:      General: No focal deficit present.      Mental Status: He is alert. Mental status is at baseline. He is disoriented.      Cranial Nerves: No cranial nerve deficit.   Psychiatric:      Comments: Patient with known history of TBI and cognitive impairment.  No suicidal or homicidal ideations.             Significant Labs: All pertinent labs within the past 24 hours have been reviewed.  Blood Culture: No results for input(s): "LABBLOO" in the last 48 hours.  BMP:   Recent Labs   Lab 12/12/23  0521         K 3.6      CO2 25   BUN 17   CREATININE 0.8   CALCIUM 8.5*   MG 2.0     CBC:   Recent Labs   Lab 12/10/23  1619 12/12/23  0521   WBC 8.70 5.66   HGB 12.1* 9.2*   HCT 39.1* 30.8*    325     TSH:   Recent Labs   Lab 11/05/23  1104   TSH 0.265*     Urine Culture:   Recent Labs   Lab 12/10/23  1740   LABURIN GRAM NEGATIVE RANDALL, " NON-LACTOSE   >100,000 cfu/ml  Identification and susceptibility pending  *       Significant Imaging: I have reviewed all pertinent imaging results/findings within the past 24 hours.        CT Abd/pelvis w/o contrast:    EXAMINATION:  CT ABDOMEN PELVIS WITHOUT CONTRAST     CLINICAL HISTORY:  Abdominal pain, acute, nonlocalized;     TECHNIQUE:  Low dose axial images, sagittal and coronal reformations were obtained from the lung bases to the pubic symphysis.  Oral contrast was not administered.     COMPARISON:  07/22/2023     FINDINGS:  Images of the lower thorax are remarkable for a cluster of pulmonary nodules within the anterior aspect of the right middle lobe, measuring up to 4-5 mm, similar to the previous examination.  There is bandlike atelectasis within the posterior aspect of the right lower lobe noting scattered adjacent ground-glass attenuation, also similar to the previous exam.  There is mild bronchiectasis in the region.  There is a 3 mm pulmonary nodule within the anterior aspect of the right lower lobe.  There are a few scattered pulmonary nodules within the left lower lobe measuring up to 3 mm, stable.     The liver, spleen, pancreas, gallbladder and adrenal glands have a grossly unremarkable noncontrast appearance.  There is no biliary dilation or ascites.  The stomach is distended with ingested content without wall thickening.  There is a small hiatal hernia.  No significant abdominal lymphadenopathy.     There is bilateral perinephric fat stranding.  There is renal vascular calcification.  There is questionable left nonobstructive nephrolithiasis.  The bilateral ureters are unremarkable without calculi seen.  The urinary bladder is nondistended, noting mild wall thickening.  The prostate is not enlarged.     There is a large amount of stool within the rectum.  There is moderate stool throughout the remainder of the large bowel.  The terminal ileum is unremarkable.  The appendix is  unremarkable.  The small bowel is grossly unremarkable.  There are a few scattered shotty periaortic, pericaval, and mesenteric lymph nodes.  There are a few scattered upper limit of normal caliber mesenteric lymph nodes.  There is atherosclerotic calcification of the aorta and its branches.  No focal organized pelvic fluid collection.     There is a lucent lesion within the right femoral neck, stable, location of which places patient at risk for fracture.  There are degenerative changes of the spine.  There is compression deformity, suspected to be related to Schmorl's node involving the superior endplate of L1, stable.  No significant inguinal lymphadenopathy.     Impression:     This report was flagged in Epic as abnormal.     1. Bilateral perinephric fat stranding, new since the previous exam.  The urinary bladder is mildly distended noting mild residual wall thickening.  Findings may reflect sequela of infectious or inflammatory process.  Correlation with urinalysis recommended.  Correlation with any history of urinary retention or outlet obstruction.  2. Large amount of stool throughout the colon particularly the rectum, concerning for constipation.  3. Scattered nodular foci and bandlike regions of atelectasis within the pulmonary parenchyma as described.  Continued follow-up recommended.  4. Please see above for several additional findings.        Electronically signed by: Chetan Sears MD  Date:                                            12/12/2023  Time:                                           14:39    Assessment/Plan:      * Diabetic ketoacidosis without coma  Patient's FSGs are uncontrolled due to hyperglycemia on current medication regimen.  Last A1c reviewed-   Lab Results   Component Value Date    HGBA1C 9.2 (H) 08/23/2023     Antihyperglycemics (From admission, onward)      Start     Stop Route Frequency Ordered    12/10/23 2547  insulin regular in 0.9 % NaCl 100 unit/100 mL (1 unit/mL) infusion         Question Answer Comment   Insulin Rate Adjustment (DO NOT MODIFY ANSWER) [email or web address] ADJUSTMENT DKA version [email or web address]    Initial dose (DO NOT CHANGE): 0.1 units/kg/hr        -- IV Continuous 12/10/23 1711          Hold Oral hypoglycemics while patient is in the hospital.  Patient has no idea how much insulin he takes  Insulin infusion initiated  IF Glucose is >250, run 0.9% NS at 125 mL/hr along with insulin infusion.  IF Glucose is < 250, run D5+0.45% NS at 125 mL/hr along with insulin infusion.  Q.4 BMP, Ph, Mg checks  Q 1 hour glucose checks  DKA resolved.  Patient started on Levemir 15 units daily, and aspart insulin 4 units pre meal  Hemoglobin A1c 11.3%    Acute cystitis without hematuria  -urine culture growing Proteus pansensitive  -start patient on Rocephin 1 g IV q.day  -urine culture grew Proteus,.  Pansensitive.  -CT abdomen and pelvis showed changes of bilateral perinephric stranding.  Possibly early pyelonephritis from Proteus.  With mild distension of the urinary bladder.  -check bladder scan.      Bipolar disorder  Cont Risperidone, Venlafaxine, and Carbamazepine       Amphetamine use disorder, severe, dependence  Noted hx  Will add on urine drug tox      Type 1 diabetes  See dka note    CAD (coronary artery disease)  Patient with known CAD   No CP  Cont ASA, Statin, and Plavix    COPD (chronic obstructive pulmonary disease)  Patient's COPD is controlled currently.    StableP  atient is currently off COPD Pathway.   Continue scheduled inhalers  prn  and monitor respiratory status closely.     Opioid abuse  Noted hx  Will add on drug tox      Chronic hepatitis C  Noted hx        VTE Risk Mitigation (From admission, onward)           Ordered     Place DAWIT hose  Until discontinued         12/10/23 1727     Place sequential compression device  Until discontinued         12/10/23 1727     IP VTE LOW RISK PATIENT  Once         12/10/23 1727                     Discharge Planning   ROLA:      Code Status: Full Code   Is the patient medically ready for discharge?:     Reason for patient still in hospital (select all that apply): Patient trending condition, Treatment, Consult recommendations, and PT / OT recommendations                     Jimmy Ibarra MD  Department of Hospital Medicine   [facility] Surg

## 2023-12-12 NOTE — NURSING
[facility] [place]  Nurses Note -- 4 Eyes    12-11-23      Skin assessed on: Q Shift      [] No Pressure Injuries Present    [x]Prevention Measures Documented    [x] Yes LDA  for Pressure Injury Previously documented     [] Yes New Pressure Injury Discovered   [] LDA for New Pressure Injury Added      Attending RN:  Yolette Mistry RN     Second RN:  Halley Victor RN

## 2023-12-12 NOTE — PT/OT/SLP PROGRESS
"Physical Therapy Treatment    Patient Name:  Cali Mabry   MRN:  [id number]    Recommendations:     Discharge Recommendations: Moderate Intensity Therapy  Discharge Equipment Recommendations:  (TBD)      Assessment:     Cali Mabry is a 58 y.o. male admitted with a medical diagnosis of Diabetic ketoacidosis without coma.  He presents with the following impairments/functional limitations: weakness, impaired balance, impaired endurance, impaired functional mobility, gait instability.    Rehab Prognosis: Good; patient would benefit from acute skilled PT services to address these deficits and reach maximum level of function.    Recent Surgery: * No surgery found *      Plan:     During this hospitalization, patient to be seen 3 x/week to address the identified rehab impairments via gait training, therapeutic activities, therapeutic exercises and progress toward the following goals:    Plan of Care Expires:  12/18/23    Subjective     Chief Complaint: None  Patient/Family Comments/goals: "I would like to get in the chair"  Pain/Comfort:  Pain Rating 1: 0/10      Objective:     Communicated with nurseHalley prior to session.  Patient found supine with peripheral IV upon PT entry to room.     General Precautions: Standard,    Orthopedic Precautions: N/A  Braces: N/A  Respiratory Status: Room air     Functional Mobility:  Bed Mobility:     Supine to Sit: minimum assistance  Sit to Supine: moderate assistance and of 2 persons  Balance: Fair sitting; min Ax1.      AM-PAC 6 CLICK MOBILITY  Turning over in bed (including adjusting bedclothes, sheets and blankets)?: 3  Sitting down on and standing up from a chair with arms (e.g., wheelchair, bedside commode, etc.): 2  Moving from lying on back to sitting on the side of the bed?: 3  Moving to and from a bed to a chair (including a wheelchair)?: 2  Need to walk in hospital room?: 1  Climbing 3-5 steps with a railing?: 1  Basic Mobility Total Score: 12       Treatment " & Education:  Pt sat EOB, able to perform 1x10 reps BLE exs including LAQ and marching.  Transport arrived to take pt to testing however, pt incontinent of urine and had to be changed.  PT/OT assisted with changing brief and gown.    Patient left supine with  nurse notified..    GOALS:   Multidisciplinary Problems       Physical Therapy Goals          Problem: Physical Therapy    Goal Priority Disciplines Outcome Goal Variances Interventions   Physical Therapy Goal     PT, PT/OT Ongoing, Progressing     Description: Goals to be met by: 23     Patient will increase functional independence with mobility by performin. Pt to be min A with bed mobility.  2. Pt to transfer with min A.  3. Pt to ambulate 50' w/RW CGA.  4. Pt to be able to tolerate 1x10 reps BLE exs with Vcs.                         Time Tracking:     PT Received On: 23  PT Start Time: 1336     PT Stop Time: 1351  PT Total Time (min): 15 min     Billable Minutes: Therapeutic Exercise 15    Treatment Type: Treatment  PT/PTA: PT           2023

## 2023-12-12 NOTE — PLAN OF CARE
Problem: Physical Therapy  Goal: Physical Therapy Goal  Description: Goals to be met by: 23     Patient will increase functional independence with mobility by performin. Pt to be min A with bed mobility.  2. Pt to transfer with min A.  3. Pt to ambulate 50' w/RW CGA.  4. Pt to be able to tolerate 1x10 reps BLE exs with Vcs.    Outcome: Ongoing, Progressing   Pt's mobility improved today; required less assistance with BM.

## 2023-12-12 NOTE — HOSPITAL COURSE
Mr. Mabry is a 58 old gentleman with a past history TBI, bipolar disorder, CHF, diabetes, hepatitis-C, hypertension, history of polysubstance abuse including IVDU was admitted to the hospital for DKA on 12/10/2023.  DKA resolved with insulin drip protocol.  Transition to subq insulin. Glucose labile. Monitoring closely. Patient was seen by psychiatry services and recommended to follow up with outpatient psychiatric clinic, no need for pec or inpatient psychiatric admission at this time.  Urine cx with Proteus mirabilis.  Denies any dysuria, but does complain suprapubic discomfort.  CT abdomen w/bilateral perinephric fat stranding, new since the previous exam.  The urinary bladder is mildly distended noting mild residual wall thickening.  Completed 5 days of Rocephin on 12/16 and was transitioned to p.o. ciprofloxacin to complete treatment course for complicated UTI/early pyelonephritis.  PT/OT consulted and recommend SNF. Awaiting placement. Patient will complete antibiotics course on 12/22/23. If no accepting facility, likely will have to discharge home with home health.

## 2023-12-12 NOTE — PLAN OF CARE
Problem: Adult Inpatient Plan of Care  Goal: Plan of Care Review  Outcome: Ongoing, Progressing  Goal: Patient-Specific Goal (Individualized)  Outcome: Ongoing, Progressing  Goal: Optimal Comfort and Wellbeing  Outcome: Ongoing, Progressing     Problem: Diabetic Ketoacidosis  Goal: Fluid and Electrolyte Balance with Absence of Ketosis  Outcome: Ongoing, Progressing     Problem: Infection  Goal: Absence of Infection Signs and Symptoms  Outcome: Ongoing, Progressing

## 2023-12-12 NOTE — PLAN OF CARE
Problem: Diabetic Ketoacidosis  Goal: Fluid and Electrolyte Balance with Absence of Ketosis  Outcome: Ongoing, Progressing     Problem: Diabetes Comorbidity  Goal: Blood Glucose Level Within Targeted Range  Outcome: Ongoing, Progressing     Problem: Skin Injury Risk Increased  Goal: Skin Health and Integrity  Outcome: Ongoing, Progressing     Problem: Impaired Wound Healing  Goal: Optimal Wound Healing  Outcome: Ongoing, Progressing

## 2023-12-12 NOTE — SUBJECTIVE & OBJECTIVE
Interval History:  Patient seen today care.  Patient reports no chest pain or shortness of breath.  He does have a known history of TBI with cognitive impairment.  Patient seen by Psychiatry, input appreciated.  Patient not recommended for pec or psychiatric inpatient admission.  Recommended to follow up with outpatient psychiatric clinic.  Patient treated for DKA with insulin protocol improved.  Urine culture growing Gram-negative bacilli with ID and sensitivity pending.    Review of Systems   Constitutional: Negative.    HENT: Negative.     Eyes: Negative.    Respiratory: Negative.     Cardiovascular: Negative.    Gastrointestinal: Negative.    Endocrine: Negative.    Genitourinary: Negative.    Musculoskeletal: Negative.    Skin: Negative.    Allergic/Immunologic: Negative.    Neurological: Negative.    Hematological: Negative.    Psychiatric/Behavioral: Negative.       Objective:     Vital Signs (Most Recent):  Temp: 97.8 °F (36.6 °C) (12/12/23 0727)  Pulse: 83 (12/12/23 0727)  Resp: 18 (12/12/23 0727)  BP: (!) 141/73 (12/12/23 0727)  SpO2: (!) 94 % (12/12/23 0727) Vital Signs (24h Range):  Temp:  [97.1 °F (36.2 °C)-98.9 °F (37.2 °C)] 97.8 °F (36.6 °C)  Pulse:  [72-89] 83  Resp:  [17-20] 18  SpO2:  [93 %-100 %] 94 %  BP: (114-144)/(60-76) 141/73     Weight: 60.9 kg (134 lb 4.2 oz)  Body mass index is 19.26 kg/m².    Intake/Output Summary (Last 24 hours) at 12/12/2023 0755  Last data filed at 12/12/2023 0128  Gross per 24 hour   Intake 480 ml   Output --   Net 480 ml         Physical Exam  Vitals reviewed.   Constitutional:       Appearance: Normal appearance.   HENT:      Head: Normocephalic and atraumatic.      Right Ear: External ear normal.      Left Ear: External ear normal.      Nose: Nose normal.      Mouth/Throat:      Mouth: Mucous membranes are moist.      Pharynx: Oropharynx is clear.   Eyes:      Extraocular Movements: Extraocular movements intact.      Conjunctiva/sclera: Conjunctivae normal.       "Pupils: Pupils are equal, round, and reactive to light.   Cardiovascular:      Rate and Rhythm: Normal rate and regular rhythm.      Pulses: Normal pulses.      Heart sounds: Normal heart sounds.   Pulmonary:      Effort: Pulmonary effort is normal.      Breath sounds: Normal breath sounds.   Abdominal:      General: Abdomen is flat. Bowel sounds are normal.      Palpations: Abdomen is soft.   Musculoskeletal:         General: Normal range of motion.      Cervical back: Normal range of motion and neck supple.   Skin:     General: Skin is warm.      Capillary Refill: Capillary refill takes less than 2 seconds.   Neurological:      General: No focal deficit present.      Mental Status: He is alert. Mental status is at baseline. He is disoriented.      Cranial Nerves: No cranial nerve deficit.   Psychiatric:      Comments: Patient with known history of TBI and cognitive impairment.  No suicidal or homicidal ideations.             Significant Labs: All pertinent labs within the past 24 hours have been reviewed.  Blood Culture: No results for input(s): "LABBLOO" in the last 48 hours.  BMP:   Recent Labs   Lab 12/12/23  0521         K 3.6      CO2 25   BUN 17   CREATININE 0.8   CALCIUM 8.5*   MG 2.0     CBC:   Recent Labs   Lab 12/10/23  1619 12/12/23  0521   WBC 8.70 5.66   HGB 12.1* 9.2*   HCT 39.1* 30.8*    325     TSH:   Recent Labs   Lab 11/05/23  1104   TSH 0.265*     Urine Culture:   Recent Labs   Lab 12/10/23  1740   LABURIN GRAM NEGATIVE RANDALL, NON-LACTOSE   >100,000 cfu/ml  Identification and susceptibility pending  *       Significant Imaging: I have reviewed all pertinent imaging results/findings within the past 24 hours.  "

## 2023-12-12 NOTE — ASSESSMENT & PLAN NOTE
-urine culture growing Gram-negative bacilli, id sensitivity pending  -start patient on Rocephin 1 g IV q.day  -adjust antibiotics as needed based on urine culture results.

## 2023-12-13 LAB
ALBUMIN SERPL BCP-MCNC: 2.6 G/DL (ref 3.5–5.2)
ALP SERPL-CCNC: 95 U/L (ref 55–135)
ALT SERPL W/O P-5'-P-CCNC: 18 U/L (ref 10–44)
ANION GAP SERPL CALC-SCNC: 9 MMOL/L (ref 8–16)
AST SERPL-CCNC: 18 U/L (ref 10–40)
BASOPHILS # BLD AUTO: 0.03 K/UL (ref 0–0.2)
BASOPHILS NFR BLD: 0.5 % (ref 0–1.9)
BILIRUB SERPL-MCNC: 0.2 MG/DL (ref 0.1–1)
BUN SERPL-MCNC: 13 MG/DL (ref 6–20)
CALCIUM SERPL-MCNC: 8.5 MG/DL (ref 8.7–10.5)
CHLORIDE SERPL-SCNC: 105 MMOL/L (ref 95–110)
CO2 SERPL-SCNC: 26 MMOL/L (ref 23–29)
CREAT SERPL-MCNC: 0.9 MG/DL (ref 0.5–1.4)
DIFFERENTIAL METHOD: ABNORMAL
EOSINOPHIL # BLD AUTO: 0.3 K/UL (ref 0–0.5)
EOSINOPHIL NFR BLD: 4.7 % (ref 0–8)
ERYTHROCYTE [DISTWIDTH] IN BLOOD BY AUTOMATED COUNT: 13.5 % (ref 11.5–14.5)
EST. GFR  (NO RACE VARIABLE): >60 ML/MIN/1.73 M^2
GLUCOSE SERPL-MCNC: 250 MG/DL (ref 70–110)
HCT VFR BLD AUTO: 33.2 % (ref 40–54)
HGB BLD-MCNC: 10.2 G/DL (ref 14–18)
IMM GRANULOCYTES # BLD AUTO: 0.09 K/UL (ref 0–0.04)
IMM GRANULOCYTES NFR BLD AUTO: 1.6 % (ref 0–0.5)
LYMPHOCYTES # BLD AUTO: 1 K/UL (ref 1–4.8)
LYMPHOCYTES NFR BLD: 18.7 % (ref 18–48)
MAGNESIUM SERPL-MCNC: 2 MG/DL (ref 1.6–2.6)
MCH RBC QN AUTO: 24.6 PG (ref 27–31)
MCHC RBC AUTO-ENTMCNC: 30.7 G/DL (ref 32–36)
MCV RBC AUTO: 80 FL (ref 82–98)
MONOCYTES # BLD AUTO: 0.5 K/UL (ref 0.3–1)
MONOCYTES NFR BLD: 9.3 % (ref 4–15)
NEUTROPHILS # BLD AUTO: 3.6 K/UL (ref 1.8–7.7)
NEUTROPHILS NFR BLD: 65.2 % (ref 38–73)
NRBC BLD-RTO: 0 /100 WBC
PHOSPHATE SERPL-MCNC: 3.1 MG/DL (ref 2.7–4.5)
PLATELET # BLD AUTO: 336 K/UL (ref 150–450)
PMV BLD AUTO: 8.8 FL (ref 9.2–12.9)
POCT GLUCOSE: 184 MG/DL (ref 70–110)
POCT GLUCOSE: 336 MG/DL (ref 70–110)
POCT GLUCOSE: 412 MG/DL (ref 70–110)
POTASSIUM SERPL-SCNC: 3.7 MMOL/L (ref 3.5–5.1)
PROT SERPL-MCNC: 6.9 G/DL (ref 6–8.4)
RBC # BLD AUTO: 4.15 M/UL (ref 4.6–6.2)
SODIUM SERPL-SCNC: 140 MMOL/L (ref 136–145)
WBC # BLD AUTO: 5.57 K/UL (ref 3.9–12.7)

## 2023-12-13 PROCEDURE — 83735 ASSAY OF MAGNESIUM: CPT | Performed by: STUDENT IN AN ORGANIZED HEALTH CARE EDUCATION/TRAINING PROGRAM

## 2023-12-13 PROCEDURE — 63600175 PHARM REV CODE 636 W HCPCS: Performed by: INTERNAL MEDICINE

## 2023-12-13 PROCEDURE — 84100 ASSAY OF PHOSPHORUS: CPT | Performed by: STUDENT IN AN ORGANIZED HEALTH CARE EDUCATION/TRAINING PROGRAM

## 2023-12-13 PROCEDURE — 25000003 PHARM REV CODE 250: Performed by: HOSPITALIST

## 2023-12-13 PROCEDURE — A4216 STERILE WATER/SALINE, 10 ML: HCPCS | Performed by: HOSPITALIST

## 2023-12-13 PROCEDURE — 97530 THERAPEUTIC ACTIVITIES: CPT

## 2023-12-13 PROCEDURE — 25000003 PHARM REV CODE 250: Performed by: INTERNAL MEDICINE

## 2023-12-13 PROCEDURE — 85025 COMPLETE CBC W/AUTO DIFF WBC: CPT | Performed by: STUDENT IN AN ORGANIZED HEALTH CARE EDUCATION/TRAINING PROGRAM

## 2023-12-13 PROCEDURE — 25000003 PHARM REV CODE 250: Performed by: FAMILY MEDICINE

## 2023-12-13 PROCEDURE — 80053 COMPREHEN METABOLIC PANEL: CPT | Performed by: STUDENT IN AN ORGANIZED HEALTH CARE EDUCATION/TRAINING PROGRAM

## 2023-12-13 PROCEDURE — 11000001 HC ACUTE MED/SURG PRIVATE ROOM

## 2023-12-13 RX ORDER — POLYETHYLENE GLYCOL 3350 17 G/17G
17 POWDER, FOR SOLUTION ORAL DAILY
Status: DISCONTINUED | OUTPATIENT
Start: 2023-12-13 | End: 2023-12-24 | Stop reason: HOSPADM

## 2023-12-13 RX ORDER — INSULIN ASPART 100 [IU]/ML
5 INJECTION, SOLUTION INTRAVENOUS; SUBCUTANEOUS
Status: DISCONTINUED | OUTPATIENT
Start: 2023-12-13 | End: 2023-12-17

## 2023-12-13 RX ORDER — LORAZEPAM 2 MG/ML
1 INJECTION INTRAMUSCULAR ONCE
Status: COMPLETED | OUTPATIENT
Start: 2023-12-13 | End: 2023-12-13

## 2023-12-13 RX ADMIN — INSULIN ASPART 1 UNITS: 100 INJECTION, SOLUTION INTRAVENOUS; SUBCUTANEOUS at 10:12

## 2023-12-13 RX ADMIN — CEFTRIAXONE 1 G: 1 INJECTION, POWDER, FOR SOLUTION INTRAMUSCULAR; INTRAVENOUS at 09:12

## 2023-12-13 RX ADMIN — RISPERIDONE 1 MG: 1 TABLET ORAL at 10:12

## 2023-12-13 RX ADMIN — LORAZEPAM 1 MG: 2 INJECTION INTRAMUSCULAR; INTRAVENOUS at 03:12

## 2023-12-13 RX ADMIN — INSULIN ASPART 10 UNITS: 100 INJECTION, SOLUTION INTRAVENOUS; SUBCUTANEOUS at 12:12

## 2023-12-13 RX ADMIN — INSULIN ASPART 5 UNITS: 100 INJECTION, SOLUTION INTRAVENOUS; SUBCUTANEOUS at 12:12

## 2023-12-13 RX ADMIN — Medication 10 ML: at 06:12

## 2023-12-13 RX ADMIN — VENLAFAXINE HYDROCHLORIDE 75 MG: 75 CAPSULE, EXTENDED RELEASE ORAL at 09:12

## 2023-12-13 RX ADMIN — ASPIRIN 81 MG CHEWABLE TABLET 81 MG: 81 TABLET CHEWABLE at 09:12

## 2023-12-13 RX ADMIN — Medication 10 ML: at 11:12

## 2023-12-13 RX ADMIN — RISPERIDONE 1 MG: 1 TABLET ORAL at 09:12

## 2023-12-13 RX ADMIN — INSULIN ASPART 5 UNITS: 100 INJECTION, SOLUTION INTRAVENOUS; SUBCUTANEOUS at 04:12

## 2023-12-13 RX ADMIN — CLOPIDOGREL BISULFATE 75 MG: 75 TABLET ORAL at 09:12

## 2023-12-13 RX ADMIN — POLYETHYLENE GLYCOL 3350 17 G: 17 POWDER, FOR SOLUTION ORAL at 09:12

## 2023-12-13 RX ADMIN — Medication 10 ML: at 12:12

## 2023-12-13 RX ADMIN — METOPROLOL SUCCINATE ER TABLETS 25 MG: 25 TABLET, FILM COATED, EXTENDED RELEASE ORAL at 09:12

## 2023-12-13 RX ADMIN — LOSARTAN POTASSIUM 25 MG: 25 TABLET, FILM COATED ORAL at 09:12

## 2023-12-13 RX ADMIN — CARBAMAZEPINE 400 MG: 100 TABLET, EXTENDED RELEASE ORAL at 10:12

## 2023-12-13 RX ADMIN — MUPIROCIN: 20 OINTMENT TOPICAL at 10:12

## 2023-12-13 RX ADMIN — CARBAMAZEPINE 200 MG: 100 TABLET, EXTENDED RELEASE ORAL at 09:12

## 2023-12-13 RX ADMIN — INSULIN ASPART 8 UNITS: 100 INJECTION, SOLUTION INTRAVENOUS; SUBCUTANEOUS at 04:12

## 2023-12-13 RX ADMIN — Medication 10 ML: at 04:12

## 2023-12-13 RX ADMIN — ATORVASTATIN CALCIUM 20 MG: 10 TABLET, FILM COATED ORAL at 09:12

## 2023-12-13 NOTE — PLAN OF CARE
Case Management Assessment     PCP: Lawanda Flores NP     Ph[facility]: [facility]    Patient Arrived From: o[facility]  Existing Help at Home: Marilou coles (Mother)   [phone] (Home Phone)     Barriers to Discharge: Requires medical care    Discharge Plan:    A. SNF   B. HOME with family, home health and mental health follow up      Lives with mother Marilou, is insulin dependent diabetic with mental health issues. DME: cane and glucometer.  Has problems ambulating mother says for last 3 months. Patient is vocal however he understands that he needs physical therapy to improve ambulation.  Mother says when he is better she would like him to return home.    When patient is being vocal( talking loudly) he is easily redirected by this [place].    [place] and sent accompanying documentation with completed pasrr.  [place] sent referrals through [facility].         12/12/23 1100   Discharge Assessment   Assessment Type Discharge Planning Assessment   Confirmed/corrected address, phone number and insurance Yes   Confirmed Demographics Correct on Facesheet   Source of Information patient;family   Reason For Admission DKA AND MOTHER SAYS NOT WALKING   People in Home parent(s)   Facility Arrived From: HOME   Do you expect to return to your current living situation? Yes   Do you have help at home or someone to help you manage your care at home? Yes   Who are your caregiver(s) and their phone number(s)? Marilou coles (Mother) [phone] (Home Phone)   Prior to hospitilization cognitive status: Alert/Oriented   Current cognitive status: Alert/Oriented   Walking or Climbing Stairs Difficulty yes   Dressing/Bathing Difficulty no   Home Accessibility wheelchair accessible   Equipment Currently Used at Home cane, straight   Readmission within 30 days? No   Patient currently being followed by outpatient case management? Unable to determine (comments)   Do you currently have service(s) that help you  manage your care at home? No   Do you take prescription medications? Yes   Do you have prescription coverage? Yes   Coverage [facility] DUAL COMPLETE HMO SNP -   Do you have any problems affording any of your prescribed medications? No   Who is going to help you get home at discharge? Marilou coles (Mother) [phone] (Home Phone)   How do you get to doctors appointments? family or friend will provide   Are you on dialysis? No   Do you take coumadin? No   Discharge Plan A Skilled Nursing Facility   Discharge Plan B Home with family;Home Health   DME Needed Upon Discharge  none   Discharge Plan discussed with: Adult children;Patient   Transition of Care Barriers Does not adhere to care plan   OTHER   Name(s) of People in Home Marilou coles (Mother) [phone] (Home Phone)

## 2023-12-13 NOTE — PLAN OF CARE
Problem: Occupational Therapy  Goal: Occupational Therapy Goal  Description: Goals to be met by: 12/25/23     Patient will increase functional independence with ADLs by performing:    Feeding with Modified [place].  UE Dressing with Modified [place].  LE Dressing with Stand-by Assistance.  Grooming while seated with Supervision.  Supine to sit with Supervision.  Upper extremity exercise program x10 reps per handout, with supervision.  Toilet transfer to bedside commode via stand pivot with Minimal Assistance    Toileting from bedside commode for hygiene and clothing management with Minimal Assistance  Stand pivot transfer with Minimal Assistance  W/c manual propulsion ~50 ft to increase functional mobility within household for ADL participation      Step transfer- to be determined pending further pt participation     Outcome: Ongoing, Progressing     MAX- total A for stand pivot bed<>w/c.

## 2023-12-13 NOTE — NURSING
[facility]  Nurses Note -- 4 Eyes      12/13/2023       Skin assessed on: Q Shift      [] No Pressure Injuries Present    []Prevention Measures Documented    [x] Yes LDA  for Pressure Injury Previously documented     [] Yes New Pressure Injury Discovered   [] LDA for New Pressure Injury Added      Attending RN:  Kaylee White, RITA     Second RN:  Halley MULLINS RN

## 2023-12-13 NOTE — PLAN OF CARE
Problem: Adult Inpatient Plan of Care  Goal: Optimal Comfort and Wellbeing  12/13/2023 1744 by Luli Danielle LPN  Outcome: Ongoing, Progressing  12/13/2023 1744 by Luli Danielle LPN  Outcome: Ongoing, Progressing     Problem: Diabetic Ketoacidosis  Goal: Fluid and Electrolyte Balance with Absence of Ketosis  Outcome: Ongoing, Progressing     Problem: Infection  Goal: Absence of Infection Signs and Symptoms  Outcome: Ongoing, Progressing     Problem: Skin Injury Risk Increased  Goal: Skin Health and Integrity  Outcome: Ongoing, Progressing

## 2023-12-13 NOTE — PROGRESS NOTES
[facility] Medicine  Progress Note    Patient Name: Cali Mabry  MRN: [id number]  Patient Class: IP- Inpatient   Admission Date: 12/10/2023  Length of Stay: 3 days  Attending Physician: Jimmy Ibarra MD  Primary Care Provider: Lawanda Flores NP        Subjective:     Principal Problem:Diabetic ketoacidosis without coma        HPI:    Cali Mabry is a 58 y.o. male who has a past medical history of Acute on chronic pancreatitis, Addiction to drug, CHF, DM type 1, HCV, HTN (hypertension), psychiatric care, IVDU (intravenous drug user), Pancreatitis, Psychiatric problem, Stroke, and Substance abuse, presented to the ED with CC of Generalized Fatigue.     Patient is unable to give much of a history. He says he takes his insulin regularly, but unable to give me even a guess as to how much he takes. Nurse states he told her he has not taken any insulin for the last couple days. He was BIB [facility] after he slid out of his chair and unable to get up. Given patient being a poor historian and no family at bedside, will put information gathered by ED:     Per chart review, patient was seen at [facility] on 11/05 for generalized weakness. Per chart review, patient was seen at [facility] on 11/07 for generalized weakness. The patient left AMA on both ED visits. Patient notes left hand trauma s/p falling out a chair. Denies head trauma and LOC. He endorses associated nausea, vomiting, diarrhea, general abdominal pain, fatigue, and visual disturbance. Patient notes compliance with insulin lispro and insulin detemir U-100. No medication PTA. No other exacerbating or alleviating factors. Patient denies fever, chills, headache, or other associated symptoms.     Patient currently denies any CP, AP or SOB.     Overview/Hospital Course:  Mr. Mabry is a pleasant 58 old gentleman with a past history TBI secondary to bipolar disorder, CHF, diabetes, hepatitis-C, hypertension, history of polysubstance abuse  including IVDU was admitted to the hospital for DKA.  DKA resolved insulin drip protocol.  Patient is seen by psychiatry services and recommended to follow up with outpatient psychiatric clinic, no need for pec or inpatient psychiatric admission at this time.   to assist with setting up outpatient follow up appointment with Psychiatry.    Interval History:  Patient seen today for follow-up care.  Patient reports no chest pains , shortness of breath, nausea, or vomiting.  Patient's urine culture grew Proteus mirabilis that is pansensitive.  CT abdomen and pelvis showed patient to have bilateral perinephric stranding, likely due to pyelonephritis.  Patient was started on IV Rocephin.  Patient feels he is improving.  Discharge patient to rehab when bed is available.   input appreciated.  I spoke with the patient's mother at the bedside, her questions and concerns were addressed.  She was updated on the patient's condition.    Review of Systems   Constitutional: Negative.    HENT: Negative.     Eyes: Negative.    Respiratory: Negative.     Cardiovascular: Negative.    Gastrointestinal: Negative.    Endocrine: Negative.    Genitourinary: Negative.    Musculoskeletal: Negative.    Skin: Negative.    Allergic/Immunologic: Negative.    Neurological: Negative.    Hematological: Negative.    Psychiatric/Behavioral: Negative.       Objective:     Vital Signs (Most Recent):  Temp: 97.3 °F (36.3 °C) (12/13/23 0332)  Pulse: 76 (12/13/23 0332)  Resp: 20 (12/13/23 0332)  BP: 133/64 (12/13/23 0332)  SpO2: (!) 94 % (12/13/23 0332) Vital Signs (24h Range):  Temp:  [97.3 °F (36.3 °C)-98.7 °F (37.1 °C)] 97.3 °F (36.3 °C)  Pulse:  [75-83] 76  Resp:  [16-20] 20  SpO2:  [94 %-95 %] 94 %  BP: (119-147)/(59-77) 133/64     Weight: 60.9 kg (134 lb 4.2 oz)  Body mass index is 19.26 kg/m².    Intake/Output Summary (Last 24 hours) at 12/13/2023 0720  Last data filed at 12/12/2023 1738  Gross per 24 hour   Intake 600 ml  "  Output --   Net 600 ml         Physical Exam  Vitals reviewed.   Constitutional:       Appearance: Normal appearance. He is normal weight.   HENT:      Head: Normocephalic and atraumatic.      Right Ear: External ear normal.      Left Ear: External ear normal.      Nose: Nose normal.      Mouth/Throat:      Mouth: Mucous membranes are moist.      Pharynx: Oropharynx is clear.   Eyes:      Extraocular Movements: Extraocular movements intact.      Conjunctiva/sclera: Conjunctivae normal.      Pupils: Pupils are equal, round, and reactive to light.   Cardiovascular:      Rate and Rhythm: Normal rate and regular rhythm.      Pulses: Normal pulses.      Heart sounds: Normal heart sounds.   Pulmonary:      Effort: Pulmonary effort is normal.      Breath sounds: Normal breath sounds.   Abdominal:      General: Abdomen is flat. Bowel sounds are normal.      Palpations: Abdomen is soft.   Musculoskeletal:         General: Normal range of motion.   Skin:     General: Skin is warm.      Capillary Refill: Capillary refill takes less than 2 seconds.   Neurological:      General: No focal deficit present.      Mental Status: He is alert. Mental status is at baseline.      Cranial Nerves: No cranial nerve deficit.      Motor: Weakness present.   Psychiatric:         Mood and Affect: Mood normal.         Thought Content: Thought content normal.         Judgment: Judgment normal.             Significant Labs: All pertinent labs within the past 24 hours have been reviewed.  Blood Culture: No results for input(s): "LABBLOO" in the last 48 hours.  BMP:   Recent Labs   Lab 12/13/23  0555   *      K 3.7      CO2 26   BUN 13   CREATININE 0.9   CALCIUM 8.5*   MG 2.0     CBC:   Recent Labs   Lab 12/12/23  0521 12/13/23  0555   WBC 5.66 5.57   HGB 9.2* 10.2*   HCT 30.8* 33.2*    336     TSH:   Recent Labs   Lab 11/05/23  1104   TSH 0.265*     Urine Culture: No results for input(s): "LABURIN" in the last 48 " hours.    Significant Imaging: I have reviewed all pertinent imaging results/findings within the past 24 hours.    Assessment/Plan:      * Diabetic ketoacidosis without coma, resolved  Patient's FSGs are uncontrolled due to hyperglycemia on current medication regimen.  Last A1c reviewed-   Lab Results   Component Value Date    HGBA1C 9.2 (H) 08/23/2023     Antihyperglycemics (From admission, onward)      Start     Stop Route Frequency Ordered    12/10/23 1715  insulin regular in 0.9 % NaCl 100 unit/100 mL (1 unit/mL) infusion        Question Answer Comment   Insulin Rate Adjustment (DO NOT MODIFY ANSWER) \\[employer]\epic\Images\Pharmacy\InsulinInfusions\INSULIN ADJUSTMENT DKA version [id number].pdf    Initial dose (DO NOT CHANGE): 0.1 units/kg/hr        -- IV Continuous 12/10/23 1711          Hold Oral hypoglycemics while patient is in the hospital.  Patient started on Lantus 15 units SC daily, would increase to 18 units.  -patient is started on pre meal insulin 4 units 3 times daily., will increase to 5 units.  -goal keep blood sugars 140-180 range if possible     Acute cystitis without hematuria  -urine culture Proteus mirabilis, pansensitive  -CT abdomen and pelvis shows bilateral perinephric stranding  -start patient on Rocephin 1 g IV q.day        Bipolar disorder  Cont Risperidone, Venlafaxine, and Carbamazepine       Amphetamine use disorder, severe, dependence  Noted hx  Will add on urine drug tox      Type 1 diabetes  See dka note    CAD (coronary artery disease)  Patient with known CAD   No CP  Cont ASA, Statin, and Plavix    COPD (chronic obstructive pulmonary disease)  Patient's COPD is controlled currently.    [facility]   Continue scheduled inhalers  prn  and monitor respiratory status closely.     Opioid abuse  Noted hx  Will add on drug tox      Chronic hepatitis C  Noted hx        VTE Risk Mitigation (From admission, onward)           Ordered     Place DAWIT hose   Until discontinued         12/10/23 1727     Place sequential compression device  Until discontinued         12/10/23 1727     IP VTE LOW RISK PATIENT  Once         12/10/23 1727                    Discharge Planning   ROLA:      Code Status: Full Code   Is the patient medically ready for discharge?:     Reason for patient still in hospital (select all that apply): Laboratory test, Treatment, PT / OT recommendations, and Pending disposition                     Jimmy Ibarra MD  Department of Hospital Medicine   [facility]

## 2023-12-13 NOTE — SUBJECTIVE & OBJECTIVE
Interval History:  Patient seen today for follow-up care.  Patient reports no chest pains , shortness of breath, nausea, or vomiting.  Patient's urine culture grew Proteus mirabilis that is pansensitive.  CT abdomen and pelvis showed patient to have bilateral perinephric stranding, likely due to pyelonephritis.  Patient was started on IV Rocephin.  Patient feels he is improving.  Discharge patient to rehab when bed is available.   input appreciated.  I spoke with the patient's mother at the bedside, her questions and concerns were addressed.  She was updated on the patient's condition.    Review of Systems   Constitutional: Negative.    HENT: Negative.     Eyes: Negative.    Respiratory: Negative.     Cardiovascular: Negative.    Gastrointestinal: Negative.    Endocrine: Negative.    Genitourinary: Negative.    Musculoskeletal: Negative.    Skin: Negative.    Allergic/Immunologic: Negative.    Neurological: Negative.    Hematological: Negative.    Psychiatric/Behavioral: Negative.       Objective:     Vital Signs (Most Recent):  Temp: 97.3 °F (36.3 °C) (12/13/23 0332)  Pulse: 76 (12/13/23 0332)  Resp: 20 (12/13/23 0332)  BP: 133/64 (12/13/23 0332)  SpO2: (!) 94 % (12/13/23 0332) Vital Signs (24h Range):  Temp:  [97.3 °F (36.3 °C)-98.7 °F (37.1 °C)] 97.3 °F (36.3 °C)  Pulse:  [75-83] 76  Resp:  [16-20] 20  SpO2:  [94 %-95 %] 94 %  BP: (119-147)/(59-77) 133/64     Weight: 60.9 kg (134 lb 4.2 oz)  Body mass index is 19.26 kg/m².    Intake/Output Summary (Last 24 hours) at 12/13/2023 0720  Last data filed at 12/12/2023 1738  Gross per 24 hour   Intake 600 ml   Output --   Net 600 ml         Physical Exam  Vitals reviewed.   Constitutional:       Appearance: Normal appearance. He is normal weight.   HENT:      Head: Normocephalic and atraumatic.      Right Ear: External ear normal.      Left Ear: External ear normal.      Nose: Nose normal.      Mouth/Throat:      Mouth: Mucous membranes are moist.       "Pharynx: Oropharynx is clear.   Eyes:      Extraocular Movements: Extraocular movements intact.      Conjunctiva/sclera: Conjunctivae normal.      Pupils: Pupils are equal, round, and reactive to light.   Cardiovascular:      Rate and Rhythm: Normal rate and regular rhythm.      Pulses: Normal pulses.      Heart sounds: Normal heart sounds.   Pulmonary:      Effort: Pulmonary effort is normal.      Breath sounds: Normal breath sounds.   Abdominal:      General: Abdomen is flat. Bowel sounds are normal.      Palpations: Abdomen is soft.   Musculoskeletal:         General: Normal range of motion.   Skin:     General: Skin is warm.      Capillary Refill: Capillary refill takes less than 2 seconds.   Neurological:      General: No focal deficit present.      Mental Status: He is alert. Mental status is at baseline.      Cranial Nerves: No cranial nerve deficit.      Motor: Weakness present.   Psychiatric:         Mood and Affect: Mood normal.         Thought Content: Thought content normal.         Judgment: Judgment normal.             Significant Labs: All pertinent labs within the past 24 hours have been reviewed.  Blood Culture: No results for input(s): "LABBLOO" in the last 48 hours.  BMP:   Recent Labs   Lab 12/13/23  0555   *      K 3.7      CO2 26   BUN 13   CREATININE 0.9   CALCIUM 8.5*   MG 2.0     CBC:   Recent Labs   Lab 12/12/23  0521 12/13/23  0555   WBC 5.66 5.57   HGB 9.2* 10.2*   HCT 30.8* 33.2*    336     TSH:   Recent Labs   Lab 11/05/23  1104   TSH 0.265*     Urine Culture: No results for input(s): "LABURIN" in the last 48 hours.    Significant Imaging: I have reviewed all pertinent imaging results/findings within the past 24 hours.  "

## 2023-12-13 NOTE — NURSING
[facility]  Nurses Note -- 4 Eyes      12/13/2023       Skin assessed on: Q Shift      [] No Pressure Injuries Present    []Prevention Measures Documented    [x] Yes LDA  for Pressure Injury Previously documented     [] Yes New Pressure Injury Discovered   [] LDA for New Pressure Injury Added      Attending :  Luli Danielle LPN     Second RN:  Salvador White RN

## 2023-12-13 NOTE — PLAN OF CARE
Patient AAOx2, self and situation.VS stable, afrebrile. Pain managed. Neurovascular intact. Free from falls. Frequent rounds made for safety, pain and comfort. Bed at lowest position, call light within reach, side rails up x2.  Bed alarm set.   Problem: Adult Inpatient Plan of Care  Goal: Plan of Care Review  Outcome: Ongoing, Progressing     Problem: Diabetic Ketoacidosis  Goal: Fluid and Electrolyte Balance with Absence of Ketosis  Outcome: Ongoing, Progressing     Problem: Diabetes Comorbidity  Goal: Blood Glucose Level Within Targeted Range  Outcome: Ongoing, Progressing

## 2023-12-13 NOTE — PT/OT/SLP PROGRESS
Occupational Therapy   Treatment    Name: Cali Mabry  MRN: [id number]  Admitting Diagnosis:  Diabetic ketoacidosis without coma       Recommendations:     Discharge Recommendations: Moderate Intensity Therapy  Discharge Equipment Recommendations:   (TBD)  Barriers to discharge:   (not at PLOF; high risk of falls)    Assessment:     Cali Mabry is a 58 y.o. male with a medical diagnosis of Diabetic ketoacidosis without coma. Performance deficits affecting function are weakness, gait instability, decreased upper extremity function, decreased ROM, impaired endurance, impaired balance, decreased lower extremity function, decreased safety awareness, visual deficits, impaired fine motor, impaired cognition, impaired self care skills, impaired skin, decreased coordination, impaired functional mobility.     Rehab Prognosis:  Fair; patient would benefit from acute skilled OT services to address these deficits and reach maximum level of function.       Plan:     Patient to be seen  (3-5x/week) to address the above listed problems via self-care/home management, therapeutic activities, therapeutic exercises  Plan of Care Expires: 12/25/23  Plan of Care Reviewed with: patient    Subjective     Chief Complaint: exhausted from w/c mobility   Patient/Family Comments/goals: primarily a blank stare throughout session; calm and agreeable to session    Pain/Comfort:  Pain Rating 1: 0/10    Objective:     Communicated with: nursing prior to session.  Patient found supine getting cleaned up with CNA present with PICC line, telemetry, bed alarm (avasys monitor) upon OT entry to room.    General Precautions: Standard, fall, vision impaired, diabetic    Orthopedic Precautions:N/A  Braces: N/A  Respiratory Status: Room air     Occupational Performance:     Bed Mobility:    Patient completed Rolling/Turning to Left with  minimum assistance and with side rail  Patient completed Rolling/Turning to Right with minimum assistance and  with side rail  Patient completed Scooting anteriorly with total assistance d/t no initiation despite max cueing and increased time   Patient completed Supine to Sit with moderate assistance with HOB elevated   Patient completed Sit to Supine with moderate assistance     Functional Mobility/Transfers:  Patient completed Bed > Wheelchair Transfer using Stand Pivot technique with maximal assistance with no assistive device  W/c>bed stand pivot t/f with total A   Functional Mobility: Gait belt donned prior to transfer for safety during mobility/transfers. OT provided hand-over-hand assist with MAX A to unlock w/c brakes. Pt unable to sequence B/L LE and UE for manual w/c propulsion so B/L foot plates put on. Pt with poor attention to L side and no functional use of LUE despite max cueing. Pt used RUE for manual w/c propulsion with hand-over-hand to LUE with MAX A for ~30 ft with 3 rest breaks. Attempted visual scanning to objects on the L with pt unable to identify all 3 objects.     Activities of Daily Living:  Grooming: stand by assistance seated EOB to comb hair   Upper Body Dressing: maximal assistance to don gown in supine   Lower Body Dressing: total assistance to fix socks       [facility] 6 Click ADL: 12    Treatment & Education:  Pt re-educated on OT role/POC.   Importance of OOB activity with staff assistance.  Safety during functional t/f and mobility; extra time provided with simple cueing to allow for delayed processing/any initiation.   Multiple self-care tasks/functional mobility completed- assistance level noted above   All questions/concerns answered within OT scope of practice       Patient left  HOB elevated R sidelying with B/L heels offloaded by pillow  with all lines intact, call button in reach, bed alarm on, avasys monitor present, and calm music playing; lights on, blinds opened, door open, tray table next to pt.     GOALS:   Multidisciplinary Problems       Occupational Therapy Goals           Problem: Occupational Therapy    Goal Priority Disciplines Outcome Interventions   Occupational Therapy Goal     OT, PT/OT Ongoing, Progressing    Description: Goals to be met by: 12/25/23     Patient will increase functional independence with ADLs by performing:    Feeding with Modified [place].  UE Dressing with Modified [place].  LE Dressing with Stand-by Assistance.  Grooming while seated with Supervision.  Supine to sit with Supervision.  Upper extremity exercise program x10 reps per handout, with supervision.  Toilet transfer to bedside commode via stand pivot with Minimal Assistance    Toileting from bedside commode for hygiene and clothing management with Minimal Assistance  Stand pivot transfer with Minimal Assistance  W/c manual propulsion ~50 ft to increase functional mobility within household for ADL participation      Step transfer- to be determined pending further pt participation                          Time Tracking:     OT Date of Treatment: 12/13/23  OT Start Time: 1503  OT Stop Time: 1531  OT Total Time (min): 28 min    Billable Minutes:Therapeutic Activity 28 min     OT/LEONCIO: OT     Number of LEONCIO visits since last OT visit: 0    12/13/2023

## 2023-12-14 LAB
ALBUMIN SERPL BCP-MCNC: 2.8 G/DL (ref 3.5–5.2)
ALP SERPL-CCNC: 106 U/L (ref 55–135)
ALT SERPL W/O P-5'-P-CCNC: 18 U/L (ref 10–44)
ANION GAP SERPL CALC-SCNC: 9 MMOL/L (ref 8–16)
AST SERPL-CCNC: 15 U/L (ref 10–40)
BASOPHILS # BLD AUTO: 0.02 K/UL (ref 0–0.2)
BASOPHILS NFR BLD: 0.3 % (ref 0–1.9)
BILIRUB SERPL-MCNC: 0.2 MG/DL (ref 0.1–1)
BUN SERPL-MCNC: 12 MG/DL (ref 6–20)
CALCIUM SERPL-MCNC: 8.8 MG/DL (ref 8.7–10.5)
CHLORIDE SERPL-SCNC: 104 MMOL/L (ref 95–110)
CO2 SERPL-SCNC: 27 MMOL/L (ref 23–29)
CREAT SERPL-MCNC: 0.8 MG/DL (ref 0.5–1.4)
DIFFERENTIAL METHOD: ABNORMAL
EOSINOPHIL # BLD AUTO: 0.2 K/UL (ref 0–0.5)
EOSINOPHIL NFR BLD: 3 % (ref 0–8)
ERYTHROCYTE [DISTWIDTH] IN BLOOD BY AUTOMATED COUNT: 13.5 % (ref 11.5–14.5)
EST. GFR  (NO RACE VARIABLE): >60 ML/MIN/1.73 M^2
GLUCOSE SERPL-MCNC: 144 MG/DL (ref 70–110)
HCT VFR BLD AUTO: 34.2 % (ref 40–54)
HGB BLD-MCNC: 10.8 G/DL (ref 14–18)
IMM GRANULOCYTES # BLD AUTO: 0.09 K/UL (ref 0–0.04)
IMM GRANULOCYTES NFR BLD AUTO: 1.3 % (ref 0–0.5)
LYMPHOCYTES # BLD AUTO: 1.3 K/UL (ref 1–4.8)
LYMPHOCYTES NFR BLD: 19 % (ref 18–48)
MAGNESIUM SERPL-MCNC: 2.1 MG/DL (ref 1.6–2.6)
MCH RBC QN AUTO: 24.9 PG (ref 27–31)
MCHC RBC AUTO-ENTMCNC: 31.6 G/DL (ref 32–36)
MCV RBC AUTO: 79 FL (ref 82–98)
MONOCYTES # BLD AUTO: 0.4 K/UL (ref 0.3–1)
MONOCYTES NFR BLD: 6.6 % (ref 4–15)
NEUTROPHILS # BLD AUTO: 4.7 K/UL (ref 1.8–7.7)
NEUTROPHILS NFR BLD: 69.8 % (ref 38–73)
NRBC BLD-RTO: 0 /100 WBC
PHOSPHATE SERPL-MCNC: 3.5 MG/DL (ref 2.7–4.5)
PLATELET # BLD AUTO: 340 K/UL (ref 150–450)
PMV BLD AUTO: 8.6 FL (ref 9.2–12.9)
POCT GLUCOSE: 108 MG/DL (ref 70–110)
POCT GLUCOSE: 206 MG/DL (ref 70–110)
POCT GLUCOSE: 249 MG/DL (ref 70–110)
POCT GLUCOSE: 312 MG/DL (ref 70–110)
POTASSIUM SERPL-SCNC: 3.7 MMOL/L (ref 3.5–5.1)
PROT SERPL-MCNC: 7.4 G/DL (ref 6–8.4)
RBC # BLD AUTO: 4.34 M/UL (ref 4.6–6.2)
SODIUM SERPL-SCNC: 140 MMOL/L (ref 136–145)
WBC # BLD AUTO: 6.67 K/UL (ref 3.9–12.7)

## 2023-12-14 PROCEDURE — 25000003 PHARM REV CODE 250: Performed by: HOSPITALIST

## 2023-12-14 PROCEDURE — 97535 SELF CARE MNGMENT TRAINING: CPT

## 2023-12-14 PROCEDURE — A4216 STERILE WATER/SALINE, 10 ML: HCPCS | Performed by: HOSPITALIST

## 2023-12-14 PROCEDURE — 83735 ASSAY OF MAGNESIUM: CPT | Performed by: STUDENT IN AN ORGANIZED HEALTH CARE EDUCATION/TRAINING PROGRAM

## 2023-12-14 PROCEDURE — 11000001 HC ACUTE MED/SURG PRIVATE ROOM

## 2023-12-14 PROCEDURE — 84100 ASSAY OF PHOSPHORUS: CPT | Performed by: STUDENT IN AN ORGANIZED HEALTH CARE EDUCATION/TRAINING PROGRAM

## 2023-12-14 PROCEDURE — 85025 COMPLETE CBC W/AUTO DIFF WBC: CPT | Performed by: STUDENT IN AN ORGANIZED HEALTH CARE EDUCATION/TRAINING PROGRAM

## 2023-12-14 PROCEDURE — 25000003 PHARM REV CODE 250: Performed by: FAMILY MEDICINE

## 2023-12-14 PROCEDURE — 63600175 PHARM REV CODE 636 W HCPCS: Performed by: INTERNAL MEDICINE

## 2023-12-14 PROCEDURE — 97530 THERAPEUTIC ACTIVITIES: CPT

## 2023-12-14 PROCEDURE — 25000003 PHARM REV CODE 250: Performed by: INTERNAL MEDICINE

## 2023-12-14 PROCEDURE — 80053 COMPREHEN METABOLIC PANEL: CPT | Performed by: STUDENT IN AN ORGANIZED HEALTH CARE EDUCATION/TRAINING PROGRAM

## 2023-12-14 RX ORDER — CIPROFLOXACIN 500 MG/1
500 TABLET ORAL EVERY 12 HOURS
Status: COMPLETED | OUTPATIENT
Start: 2023-12-15 | End: 2023-12-21

## 2023-12-14 RX ADMIN — LOSARTAN POTASSIUM 25 MG: 25 TABLET, FILM COATED ORAL at 08:12

## 2023-12-14 RX ADMIN — CEFTRIAXONE 1 G: 1 INJECTION, POWDER, FOR SOLUTION INTRAMUSCULAR; INTRAVENOUS at 08:12

## 2023-12-14 RX ADMIN — VENLAFAXINE HYDROCHLORIDE 75 MG: 75 CAPSULE, EXTENDED RELEASE ORAL at 08:12

## 2023-12-14 RX ADMIN — RISPERIDONE 1 MG: 1 TABLET ORAL at 08:12

## 2023-12-14 RX ADMIN — Medication 10 ML: at 05:12

## 2023-12-14 RX ADMIN — MUPIROCIN: 20 OINTMENT TOPICAL at 09:12

## 2023-12-14 RX ADMIN — INSULIN ASPART 5 UNITS: 100 INJECTION, SOLUTION INTRAVENOUS; SUBCUTANEOUS at 07:12

## 2023-12-14 RX ADMIN — Medication 10 ML: at 11:12

## 2023-12-14 RX ADMIN — MUPIROCIN: 20 OINTMENT TOPICAL at 08:12

## 2023-12-14 RX ADMIN — ATORVASTATIN CALCIUM 20 MG: 10 TABLET, FILM COATED ORAL at 08:12

## 2023-12-14 RX ADMIN — INSULIN ASPART 5 UNITS: 100 INJECTION, SOLUTION INTRAVENOUS; SUBCUTANEOUS at 08:12

## 2023-12-14 RX ADMIN — CLOPIDOGREL BISULFATE 75 MG: 75 TABLET ORAL at 08:12

## 2023-12-14 RX ADMIN — CARBAMAZEPINE 200 MG: 100 TABLET, EXTENDED RELEASE ORAL at 08:12

## 2023-12-14 RX ADMIN — Medication 10 ML: at 04:12

## 2023-12-14 RX ADMIN — INSULIN ASPART 5 UNITS: 100 INJECTION, SOLUTION INTRAVENOUS; SUBCUTANEOUS at 04:12

## 2023-12-14 RX ADMIN — INSULIN ASPART 5 UNITS: 100 INJECTION, SOLUTION INTRAVENOUS; SUBCUTANEOUS at 12:12

## 2023-12-14 RX ADMIN — RISPERIDONE 1 MG: 1 TABLET ORAL at 09:12

## 2023-12-14 RX ADMIN — CARBAMAZEPINE 400 MG: 100 TABLET, EXTENDED RELEASE ORAL at 09:12

## 2023-12-14 RX ADMIN — METOPROLOL SUCCINATE ER TABLETS 25 MG: 25 TABLET, FILM COATED, EXTENDED RELEASE ORAL at 08:12

## 2023-12-14 RX ADMIN — INSULIN ASPART 8 UNITS: 100 INJECTION, SOLUTION INTRAVENOUS; SUBCUTANEOUS at 05:12

## 2023-12-14 RX ADMIN — POLYETHYLENE GLYCOL 3350 17 G: 17 POWDER, FOR SOLUTION ORAL at 08:12

## 2023-12-14 RX ADMIN — ASPIRIN 81 MG CHEWABLE TABLET 81 MG: 81 TABLET CHEWABLE at 08:12

## 2023-12-14 NOTE — PT/OT/SLP PROGRESS
"Physical Therapy Treatment    Patient Name:  Cali Mabry   MRN:  [id number]    Recommendations:     Discharge Recommendations: Moderate Intensity Therapy  Discharge Equipment Recommendations:  (TBD)      Assessment:     Cali Mabry is a 58 y.o. male admitted with a medical diagnosis of Diabetic ketoacidosis without coma.  He presents with the following impairments/functional limitations: weakness, impaired balance, impaired endurance, impaired functional mobility, gait instability.    Rehab Prognosis: Fair; patient would benefit from acute skilled PT services to address these deficits and reach maximum level of function.    Recent Surgery: * No surgery found *      Plan:     During this hospitalization, patient to be seen 3 x/week to address the identified rehab impairments via gait training, therapeutic activities, therapeutic exercises and progress toward the following goals:    Plan of Care Expires:  12/18/23    Subjective     Chief Complaint: None  Patient/Family Comments/goals: "I want some pie". Pt agreeable to therapy treatment.  Pain/Comfort:  Pain Rating 1: 0/10      Objective:     Communicated with nurseLuli prior to session.  Patient found HOB elevated with bed alarm, pena catheter upon PT entry to room.     General Precautions: Standard,    Orthopedic Precautions: N/A  Braces: N/A  Respiratory Status: Room air     Functional Mobility:  Bed Mobility:     Supine to Sit: moderate assistance and of 2 persons  Sit to Supine: total assistance and of 2 persons  Transfers:     Bed to Chair: moderate assistance with  no AD  using  Squat Pivot  Wheelchair Propulsion:  Pt propelled Standard wheelchair x 50 feet on Level tile with  Bilateral upper extremity with Maximum Assistance.       AM-PAC 6 CLICK MOBILITY  Turning over in bed (including adjusting bedclothes, sheets and blankets)?: 3  Sitting down on and standing up from a chair with arms (e.g., wheelchair, bedside commode, etc.): 2  Moving " from lying on back to sitting on the side of the bed?: 2  Moving to and from a bed to a chair (including a wheelchair)?: 2  Need to walk in hospital room?: 1  Climbing 3-5 steps with a railing?: 1  Basic Mobility Total Score: 11       Treatment & Education:  Pt transferred OOB to wheelchair, able to propel with mod A and persistent verbal and occasional min A for proper technique; increased elbow flexion and full forward motion.    Patient left HOB elevated with all lines intact, call button in reach, and all needs in reach .    GOALS:   Multidisciplinary Problems       Physical Therapy Goals          Problem: Physical Therapy    Goal Priority Disciplines Outcome Goal Variances Interventions   Physical Therapy Goal     PT, PT/OT Ongoing, Progressing     Description: Goals to be met by: 23     Patient will increase functional independence with mobility by performin. Pt to be min A with bed mobility.  2. Pt to transfer with min A.  3. Pt to ambulate 50' w/RW CGA.  4. Pt to be able to tolerate 1x10 reps BLE exs with Vcs.                         Time Tracking:     PT Received On: 23  PT Start Time: 1250     PT Stop Time: 1318  PT Total Time (min): 28 min     Billable Minutes: Therapeutic Activity 28 (Co-treatment with Danielle, OT)    Treatment Type: Treatment  PT/PTA: PT           2023

## 2023-12-14 NOTE — PLAN OF CARE
Problem: Physical Therapy  Goal: Physical Therapy Goal  Description: Goals to be met by: 23     Patient will increase functional independence with mobility by performin. Pt to be min A with bed mobility.  2. Pt to transfer with min A.  3. Pt to ambulate 50' w/RW CGA.  4. Pt to be able to tolerate 1x10 reps BLE exs with Vcs.    Outcome: Ongoing, Progressing   Pt able to propel wheelchair with mod A and max Vcs.

## 2023-12-14 NOTE — NURSING
[facility] [place]  Nurses Note -- 4 Eyes      12/14/2023       Skin assessed on: Q Shift      [] No Pressure Injuries Present    []Prevention Measures Documented    [x] Yes LDA  for Pressure Injury Previously documented     [] Yes New Pressure Injury Discovered   [] LDA for New Pressure Injury Added      Attending :  Luli Danielle LPN     Second RN:  Kaylee White RN

## 2023-12-14 NOTE — ASSESSMENT & PLAN NOTE
-Urine culture from 12/10/2023 grew pan-sensitive Proteus. Doing well with ceftriaxone; will transition to oral course.

## 2023-12-14 NOTE — PT/OT/SLP PROGRESS
Occupational Therapy      Patient Name:  Cali Mabry   MRN:  [id number]    Patient not seen today secondary to: pt soiled with BM requiring nursing care. CNA notified. Will follow-up later as able.    12/14/2023

## 2023-12-14 NOTE — PLAN OF CARE
Problem: Diabetic Ketoacidosis  Goal: Fluid and Electrolyte Balance with Absence of Ketosis  Outcome: Ongoing, Progressing     Problem: Diabetes Comorbidity  Goal: Blood Glucose Level Within Targeted Range  Outcome: Ongoing, Progressing     Problem: Infection  Goal: Absence of Infection Signs and Symptoms  Outcome: Ongoing, Progressing     Problem: Skin Injury Risk Increased  Goal: Skin Health and Integrity  Outcome: Ongoing, Progressing

## 2023-12-14 NOTE — ASSESSMENT & PLAN NOTE
Patient's COPD is controlled currently. Respiratory status stable.  Continue inhalers  prn  and monitor respiratory status closely.

## 2023-12-14 NOTE — PROGRESS NOTES
[facility] Medicine  Progress Note    Patient Name: Cali Mabry  MRN: [id number]  Patient Class: IP- Inpatient   Admission Date: 12/10/2023  Length of Stay: 4 days  Attending Physician: Perla Bear MD  Primary Care Provider: Lawanda Flores NP        Subjective:     Principal Problem:Diabetic ketoacidosis without coma    Patient was seen this morning. His nurse had a concern that he was having a nose bleed which has since resolved. Patient denied any pain or acute concerns when I rounded on him this morning. No fevers or other new issues reported today.      Objective:     Vital Signs (Most Recent):  Temp: 96.8 °F (36 °C) (12/14/23 1210)  Pulse: 91 (12/14/23 1210)  Resp: 20 (12/14/23 1210)  BP: 105/70 (12/14/23 1210)  SpO2: 95 % (12/14/23 1210) Vital Signs (24h Range):  Temp:  [96.8 °F (36 °C)-97.8 °F (36.6 °C)] 96.8 °F (36 °C)  Pulse:  [74-98] 91  Resp:  [18-20] 20  SpO2:  [95 %-96 %] 95 %  BP: (105-174)/(69-83) 105/70     Weight: 60.9 kg (134 lb 4.2 oz)  Body mass index is 19.26 kg/m².    Intake/Output Summary (Last 24 hours) at 12/14/2023 1555  Last data filed at 12/14/2023 1100  Gross per 24 hour   Intake 240 ml   Output 850 ml   Net -610 ml         Physical Exam    Constitutional: Well developed. No acute distress.   HEENT: NCAT. Conjunctiva clear. Nose normal. MMM. Neck supple.  Cardiovascular: RRR. No murmurs or rubs appreciated.  Pulmonary: Pulmonary effort is normal. Normal breath sounds.   Abdominal: Soft. NT/ND. Bowel sounds are normal.   Musculoskeletal: Normal tone and muscle bulk.  Skin: Warm and well perfused. Normal radial and DP pulses.  Extremities: +healing abrasions on bilateral anterior shins.  Neurological: Alert. No focal deficit appreciated.  Psychiatric: Speech normal. Cooperative during my encounter.     Significant Labs: All pertinent labs within the past 24 hours have been reviewed.  CBC:   Recent Labs   Lab 12/13/23  0555 12/14/23  0550   WBC 5.57  6.67   HGB 10.2* 10.8*   HCT 33.2* 34.2*    340     CMP:   Recent Labs   Lab 12/13/23  0555 12/14/23  0550    140   K 3.7 3.7    104   CO2 26 27   * 144*   BUN 13 12   CREATININE 0.9 0.8   CALCIUM 8.5* 8.8   PROT 6.9 7.4   ALBUMIN 2.6* 2.8*   BILITOT 0.2 0.2   ALKPHOS 95 106   AST 18 15   ALT 18 18   ANIONGAP 9 9       Significant Imaging: I have reviewed all pertinent imaging results/findings within the past 24 hours. No new imaging today.    Assessment/Plan:      * Diabetic ketoacidosis without coma  Patient's FSGs are uncontrolled due to hyperglycemia on current medication regimen.  Last A1c reviewed:  Lab Results   Component Value Date    HGBA1C 11.3 (H) 12/10/2023     Antihyperglycemics (From admission, onward)      Start     Stop Route Frequency Ordered    12/14/23 2100  insulin detemir U-100 (Levemir) pen 18 Units  (Order Panel)         -- SubQ 2 times daily 12/14/23 1554    12/13/23 1130  insulin aspart U-100 pen 5 Units  (Order Panel)         -- SubQ 3 times daily with meals 12/13/23 0724    12/11/23 0249  insulin aspart U-100 pen 1-10 Units  (Order Panel)         -- SubQ Before meals & nightly PRN 12/11/23 0150          Hold Oral hypoglycemics while patient is in the hospital.  Patient reports that he does not know how much insulin he takes at home  Insulin infusion initiated upon admission; no longer requiring insulin gtt  Continue to monitor glucose trend and titrate insulin regimen as appropriate.    Acute cystitis without hematuria  -Urine culture from 12/10/2023 grew pan-sensitive Proteus. Doing well with ceftriaxone; will transition to oral course.    Bipolar disorder  Noted in history. Continue home medications: Risperidone, Venlafaxine, and Carbamazepine       Amphetamine use disorder, severe, dependence  Noted in history. No concern for withdrawal at this time.    Type 1 diabetes  See DKA note for plan    CAD (coronary artery disease)  Patient with known CAD. No chest  pain concerns. Continue ASA, Statin, Plavix, BB.    COPD (chronic obstructive pulmonary disease)  Patient's COPD is controlled currently. Respiratory status stable.  Continue inhalers  prn  and monitor respiratory status closely.     Opioid abuse  Noted in history. No concern for withdrawal thus far. Continue to monitor.    Chronic hepatitis C  Noted history. Outpatient follow-up to discuss treatment options if desired.        VTE Risk Mitigation (From admission, onward)           Ordered     Place DAWIT hose  Until discontinued         12/10/23 1727     Place sequential compression device  Until discontinued         12/10/23 1727     IP VTE LOW RISK PATIENT  Once         12/10/23 1727                    Discharge Planning   ROLA:      Code Status: Full Code   Is the patient medically ready for discharge?:     Reason for patient still in hospital (select all that apply): Pending disposition. Awaiting Inpatient Rehab Placement  Discharge Plan A: Skilled Nursing Facility   Discharge Delays: (!) Post-Acute Set-up    Perla Bear MD  Department of Hospital Medicine   [facility]

## 2023-12-14 NOTE — PT/OT/SLP PROGRESS
Occupational Therapy   Treatment    Name: Cali Mabry  MRN: [id number]  Admitting Diagnosis:  Diabetic ketoacidosis without coma       Recommendations:     Discharge Recommendations: Moderate Intensity Therapy  Discharge Equipment Recommendations:  wheelchair, bedside commode, bath bench  Barriers to discharge:   (not at PLOF; high risk of falls)    Assessment:     Cali Mabry is a 58 y.o. male with a medical diagnosis of Diabetic ketoacidosis without coma. Performance deficits affecting function are weakness, gait instability, decreased upper extremity function, decreased ROM, impaired balance, impaired endurance, decreased lower extremity function, decreased safety awareness, visual deficits, impaired fine motor, impaired cognition, impaired self care skills, impaired functional mobility, decreased coordination.     Rehab Prognosis:  Fair; patient would benefit from acute skilled OT services to address these deficits and reach maximum level of function.       Plan:     Patient to be seen  (3-5x/week) to address the above listed problems via self-care/home management, therapeutic activities, therapeutic exercises  Plan of Care Expires: 12/25/23  Plan of Care Reviewed with: patient    Subjective     Chief Complaint: wants more pie   Patient/Family Comments/goals: more interactive with therapists today vs yesterday; calm and participatory     Pain/Comfort:  Pain Rating 1: 0/10    Objective:     Communicated with: nurseLuli, prior to session.  Patient found  HOB elevated with pillow under L side  with bed alarm, Condom Catheter, PICC line (avasys monitor) upon OT entry to room.    General Precautions: Standard, fall, diabetic, vision impaired (L visual neglect)    Orthopedic Precautions:N/A  Braces: N/A  Respiratory Status: Room air     Occupational Performance:     Bed Mobility:    Patient completed Rolling/Turning to Right with minimum assistance and with side rail  Patient completed Scooting  anteriorly with total assistance d/t poor initiation  Patient completed Supine to Sit with moderate assistance, 2 persons, with side rail, and HOB elevated   Patient completed Sit to Supine with maximal assistance and 2 persons     Functional Mobility/Transfers:  Patient completed Bed <> Chair Transfer using Stand Pivot technique with moderate assistance and of 2 persons with BUE supported   Functional Mobility: Gait belt donned prior to transfer for safety during mobility/transfers. Pt completed ~50 ft of BUE manual w/c propulsion with MOD A, but MAX verbal cueing and MAX A to turn w/c around. Pt initially tense with max A to release LUE grasp from w/c HR. After ~6 ft of hand-over-hand assist of LUE for cyclical motion of w/c propulsion, pt able to use BUE to propel with MOD A and MAX cueing. Pt required gentle tactile cueing for cervical AROM to the L vs visual scanning target to visually attend to LUE movement- pt had difficulty with this. Total A to lock/unlock w/c. Pt not aware of obstacles in the hallway. Once returned to the room, pt had small epistaxis R nostril- nurseLuli, called to bedside who came to examine.     Activities of Daily Living:  Feeding:  pt trying to feed himself at bed level- water cup on L side of tray table with cup spilled over and water on tray. Total A to clean up. Food on his fingers and beard. Seated in the w/c, set-up with water cup for pt to grasp and drink from the straw      Grooming: set-up and max cueing to clean his fingers and face; poor initiation and max A required     Upper Body Dressing: maximal assistance with gown   Lower Body Dressing: total assistance with socks       [facility] 6 Click ADL: 12    Treatment & Education:  Pt re-educated on OT role/POC  Importance of OOB activity with staff assistance  Safety during functional t/f and mobility   Multiple self-care tasks/functional mobility completed- assistance level noted above   All questions/concerns answered within  OT scope of practice       Patient left with bed in chair position with all lines intact, call button in reach, bed alarm on, nurse, Luli, and josettes monitor present, and all needs met/within reach; lights on, blinds opened, Brionna music playing, door open.     GOALS:   Multidisciplinary Problems       Occupational Therapy Goals          Problem: Occupational Therapy    Goal Priority Disciplines Outcome Interventions   Occupational Therapy Goal     OT, PT/OT Ongoing, Progressing    Description: Goals to be met by: 12/25/23     Patient will increase functional independence with ADLs by performing:    Feeding with Modified [place].  UE Dressing with Modified [place].  LE Dressing with Stand-by Assistance.  Grooming while seated with Supervision.  Supine to sit with Supervision.  Upper extremity exercise program x10 reps per handout, with supervision.  Toilet transfer to bedside commode via stand pivot with Minimal Assistance    Toileting from bedside commode for hygiene and clothing management with Minimal Assistance  Stand pivot transfer with Minimal Assistance  W/c manual propulsion ~50 ft to increase functional mobility within household for ADL participation      Step transfer- to be determined pending further pt participation                          Time Tracking:     OT Date of Treatment: 12/14/23  OT Start Time: 1252  OT Stop Time: 1318  OT Total Time (min): 26 min    Billable Minutes:Self Care/Home Management 13 min  Therapeutic Activity 13 min  Total Time 26 min (co-treat with PT)     OT/LEOCNIO: OT     Number of LEONCIO visits since last OT visit: 0    12/14/2023

## 2023-12-14 NOTE — SUBJECTIVE & OBJECTIVE
Objective:     Vital Signs (Most Recent):  Temp: 96.8 °F (36 °C) (12/14/23 1210)  Pulse: 91 (12/14/23 1210)  Resp: 20 (12/14/23 1210)  BP: 105/70 (12/14/23 1210)  SpO2: 95 % (12/14/23 1210) Vital Signs (24h Range):  Temp:  [96.8 °F (36 °C)-97.8 °F (36.6 °C)] 96.8 °F (36 °C)  Pulse:  [74-98] 91  Resp:  [18-20] 20  SpO2:  [95 %-96 %] 95 %  BP: (105-174)/(69-83) 105/70     Weight: 60.9 kg (134 lb 4.2 oz)  Body mass index is 19.26 kg/m².    Intake/Output Summary (Last 24 hours) at 12/14/2023 1555  Last data filed at 12/14/2023 1100  Gross per 24 hour   Intake 240 ml   Output 850 ml   Net -610 ml         Physical Exam    Constitutional: Well developed. No acute distress.   HEENT: NCAT. Conjunctiva clear. Nose normal. MMM. Neck supple.  Cardiovascular: RRR. No murmurs or rubs appreciated.  Pulmonary: Pulmonary effort is normal. Normal breath sounds.   Abdominal: Soft. NT/ND. Bowel sounds are normal.   Musculoskeletal: Normal tone and muscle bulk.  Skin: Warm and well perfused. Normal radial and DP pulses.  Extremities: +healing abrasions on bilateral anterior shins.  Neurological: Alert. No focal deficit appreciated.  Psychiatric: Speech normal. Cooperative during my encounter.     Significant Labs: All pertinent labs within the past 24 hours have been reviewed.  CBC:   Recent Labs   Lab 12/13/23  0555 12/14/23  0550   WBC 5.57 6.67   HGB 10.2* 10.8*   HCT 33.2* 34.2*    340     CMP:   Recent Labs   Lab 12/13/23  0555 12/14/23  0550    140   K 3.7 3.7    104   CO2 26 27   * 144*   BUN 13 12   CREATININE 0.9 0.8   CALCIUM 8.5* 8.8   PROT 6.9 7.4   ALBUMIN 2.6* 2.8*   BILITOT 0.2 0.2   ALKPHOS 95 106   AST 18 15   ALT 18 18   ANIONGAP 9 9       Significant Imaging: I have reviewed all pertinent imaging results/findings within the past 24 hours. No new imaging today.

## 2023-12-14 NOTE — ASSESSMENT & PLAN NOTE
Patient's FSGs are uncontrolled due to hyperglycemia on current medication regimen.  Last A1c reviewed:  Lab Results   Component Value Date    HGBA1C 11.3 (H) 12/10/2023     Antihyperglycemics (From admission, onward)      Start     Stop Route Frequency Ordered    12/14/23 2100  insulin detemir U-100 (Levemir) pen 18 Units  (Order Panel)         -- SubQ 2 times daily 12/14/23 1554    12/13/23 1130  insulin aspart U-100 pen 5 Units  (Order Panel)         -- SubQ 3 times daily with meals 12/13/23 0724    12/11/23 0249  insulin aspart U-100 pen 1-10 Units  (Order Panel)         -- SubQ Before meals & nightly PRN 12/11/23 0150          Hold Oral hypoglycemics while patient is in the hospital.  Patient reports that he does not know how much insulin he takes at home  Insulin infusion initiated upon admission; no longer requiring insulin gtt  Continue to monitor glucose trend and titrate insulin regimen as appropriate.

## 2023-12-14 NOTE — PLAN OF CARE
Patient AAOx2. VS stable, afrebrile. Pain managed. Neurovascular intact. Free from falls. Frequent rounds made for safety, pain and comfort. Bed at lowest position, call light within reach, side rails up x2.    Problem: Adult Inpatient Plan of Care  Goal: Plan of Care Review  Outcome: Ongoing, Progressing  Goal: Patient-Specific Goal (Individualized)  Outcome: Ongoing, Progressing     Problem: Diabetes Comorbidity  Goal: Blood Glucose Level Within Targeted Range  Outcome: Ongoing, Progressing     Problem: Infection  Goal: Absence of Infection Signs and Symptoms  Outcome: Ongoing, Progressing     Problem: Skin Injury Risk Increased  Goal: Skin Health and Integrity  Outcome: Ongoing, Progressing

## 2023-12-15 LAB
POCT GLUCOSE: 131 MG/DL (ref 70–110)
POCT GLUCOSE: 151 MG/DL (ref 70–110)
POCT GLUCOSE: 257 MG/DL (ref 70–110)
POCT GLUCOSE: 266 MG/DL (ref 70–110)
POCT GLUCOSE: 44 MG/DL (ref 70–110)
POCT GLUCOSE: 79 MG/DL (ref 70–110)

## 2023-12-15 PROCEDURE — 25000003 PHARM REV CODE 250: Performed by: PEDIATRICS

## 2023-12-15 PROCEDURE — A4216 STERILE WATER/SALINE, 10 ML: HCPCS | Performed by: HOSPITALIST

## 2023-12-15 PROCEDURE — 25000003 PHARM REV CODE 250: Performed by: HOSPITALIST

## 2023-12-15 PROCEDURE — 63600175 PHARM REV CODE 636 W HCPCS: Performed by: INTERNAL MEDICINE

## 2023-12-15 PROCEDURE — 94761 N-INVAS EAR/PLS OXIMETRY MLT: CPT

## 2023-12-15 PROCEDURE — 25000003 PHARM REV CODE 250: Performed by: FAMILY MEDICINE

## 2023-12-15 PROCEDURE — 25000003 PHARM REV CODE 250: Performed by: INTERNAL MEDICINE

## 2023-12-15 PROCEDURE — 11000001 HC ACUTE MED/SURG PRIVATE ROOM

## 2023-12-15 PROCEDURE — 97535 SELF CARE MNGMENT TRAINING: CPT

## 2023-12-15 PROCEDURE — 97112 NEUROMUSCULAR REEDUCATION: CPT

## 2023-12-15 RX ADMIN — INSULIN ASPART 6 UNITS: 100 INJECTION, SOLUTION INTRAVENOUS; SUBCUTANEOUS at 04:12

## 2023-12-15 RX ADMIN — CARBAMAZEPINE 400 MG: 100 TABLET, EXTENDED RELEASE ORAL at 08:12

## 2023-12-15 RX ADMIN — INSULIN ASPART 6 UNITS: 100 INJECTION, SOLUTION INTRAVENOUS; SUBCUTANEOUS at 12:12

## 2023-12-15 RX ADMIN — Medication 10 ML: at 10:12

## 2023-12-15 RX ADMIN — POLYETHYLENE GLYCOL 3350 17 G: 17 POWDER, FOR SOLUTION ORAL at 08:12

## 2023-12-15 RX ADMIN — Medication 10 ML: at 11:12

## 2023-12-15 RX ADMIN — RISPERIDONE 1 MG: 1 TABLET ORAL at 08:12

## 2023-12-15 RX ADMIN — MUPIROCIN: 20 OINTMENT TOPICAL at 08:12

## 2023-12-15 RX ADMIN — Medication 10 ML: at 04:12

## 2023-12-15 RX ADMIN — CIPROFLOXACIN 500 MG: 500 TABLET, FILM COATED ORAL at 08:12

## 2023-12-15 RX ADMIN — CARBAMAZEPINE 200 MG: 100 TABLET, EXTENDED RELEASE ORAL at 08:12

## 2023-12-15 RX ADMIN — VENLAFAXINE HYDROCHLORIDE 75 MG: 75 CAPSULE, EXTENDED RELEASE ORAL at 08:12

## 2023-12-15 RX ADMIN — INSULIN ASPART 5 UNITS: 100 INJECTION, SOLUTION INTRAVENOUS; SUBCUTANEOUS at 04:12

## 2023-12-15 RX ADMIN — METOPROLOL SUCCINATE ER TABLETS 25 MG: 25 TABLET, FILM COATED, EXTENDED RELEASE ORAL at 08:12

## 2023-12-15 RX ADMIN — ASPIRIN 81 MG CHEWABLE TABLET 81 MG: 81 TABLET CHEWABLE at 08:12

## 2023-12-15 RX ADMIN — CLOPIDOGREL BISULFATE 75 MG: 75 TABLET ORAL at 08:12

## 2023-12-15 RX ADMIN — LOSARTAN POTASSIUM 25 MG: 25 TABLET, FILM COATED ORAL at 08:12

## 2023-12-15 RX ADMIN — CEFTRIAXONE 1 G: 1 INJECTION, POWDER, FOR SOLUTION INTRAMUSCULAR; INTRAVENOUS at 08:12

## 2023-12-15 RX ADMIN — Medication 10 ML: at 05:12

## 2023-12-15 RX ADMIN — Medication 10 ML: at 12:12

## 2023-12-15 RX ADMIN — ATORVASTATIN CALCIUM 20 MG: 10 TABLET, FILM COATED ORAL at 08:12

## 2023-12-15 RX ADMIN — INSULIN ASPART 5 UNITS: 100 INJECTION, SOLUTION INTRAVENOUS; SUBCUTANEOUS at 12:12

## 2023-12-15 NOTE — NURSING
[facility]  Nurses Note -- 4 Eyes      12/15/2023       Skin assessed on: Q Shift      [] No Pressure Injuries Present    []Prevention Measures Documented    [x] Yes LDA  for Pressure Injury Previously documented     [] Yes New Pressure Injury Discovered   [] LDA for New Pressure Injury Added      Attending RN:  Ingrid Perez LPN     Second RN:  Shoaib AbdiRN

## 2023-12-15 NOTE — PLAN OF CARE
Problem: Adult Inpatient Plan of Care  Goal: Plan of Care Review  Outcome: Ongoing, Progressing     Problem: Diabetic Ketoacidosis  Goal: Fluid and Electrolyte Balance with Absence of Ketosis  Outcome: Ongoing, Progressing     Problem: Diabetes Comorbidity  Goal: Blood Glucose Level Within Targeted Range  Outcome: Ongoing, Progressing     Problem: Skin Injury Risk Increased  Goal: Skin Health and Integrity  Outcome: Ongoing, Progressing     Problem: Impaired Wound Healing  Goal: Optimal Wound Healing  Outcome: Ongoing, Progressing

## 2023-12-15 NOTE — PLAN OF CARE
Problem: Occupational Therapy  Goal: Occupational Therapy Goal  Description: Goals to be met by: 12/25/23     Patient will increase functional independence with ADLs by performing:    Feeding with Modified [place].  UE Dressing with Modified [place].  LE Dressing with Stand-by Assistance.  Grooming while seated with Supervision.  Supine to sit with Supervision.  Upper extremity exercise program x10 reps per handout, with supervision.  Toilet transfer to bedside commode via stand pivot with Minimal Assistance    Toileting from bedside commode for hygiene and clothing management with Minimal Assistance  Stand pivot transfer with Minimal Assistance  W/c manual propulsion ~50 ft to increase functional mobility within household for ADL participation      Step transfer- to be determined pending further pt participation     Outcome: Ongoing, Progressing

## 2023-12-15 NOTE — CONSULTS
[facility] Medicine  Telemedicine Consult Note       Cali Mabry has been accepted for transfer to [facility] and will be followed through telemedicine services beginning 12/15/23 at 7 AM.    Lor Fernandez MD  [facility] Medicine Staff

## 2023-12-15 NOTE — PLAN OF CARE
Pt remained free of falls during current shift. Pt turned q2h.  Pt appeared to be in no distress and did not receive any prn pain medications. IV antibiotic was given per MD order. Plan of care and fall precautions reviewed with pt and verbalized understanding. Bed locked, lowered, SR up x2 and call light placed within reach.          Problem: Adult Inpatient Plan of Care  Goal: Plan of Care Review  Outcome: Ongoing, Progressing     Problem: Diabetes Comorbidity  Goal: Blood Glucose Level Within Targeted Range  Outcome: Ongoing, Progressing     Problem: Infection  Goal: Absence of Infection Signs and Symptoms  Outcome: Ongoing, Progressing     Problem: Skin Injury Risk Increased  Goal: Skin Health and Integrity  Outcome: Ongoing, Progressing     Problem: Impaired Wound Healing  Goal: Optimal Wound Healing  Outcome: Ongoing, Progressing

## 2023-12-15 NOTE — NURSING
Morning blood sugar was 44mg/dl. Rechecked blood sugar after breakfast, it is 151mg/dl. Scheduled insulin held. Dr. Fernandez notified.

## 2023-12-15 NOTE — PT/OT/SLP PROGRESS
Occupational Therapy   Treatment    Name: Cali Mabry  MRN: [id number]  Admitting Diagnosis:  Diabetic ketoacidosis without coma       Recommendations:     Discharge Recommendations: Moderate Intensity Therapy  Discharge Equipment Recommendations:  wheelchair, bedside commode, bath bench  Barriers to discharge:   (Increased caregiver assist requirements.)    Assessment:     Cali Mabry is a 58 y.o. male with a medical diagnosis of Diabetic ketoacidosis without coma, and presents with marked lethargy, progressing to low level obtundity, meal assist required. . Performance deficits affecting function are weakness, impaired self care skills, impaired balance, decreased coordination, decreased upper extremity function, decreased lower extremity function, impaired cognition, impaired functional mobility, impaired endurance, decreased safety awareness.     Rehab Prognosis:  Fair; patient would benefit from acute skilled OT services to address these deficits and reach maximum level of function.       Plan:     Patient to be seen  (3>5/week) to address the above listed problems via self-care/home management, therapeutic activities, therapeutic exercises, neuromuscular re-education  Plan of Care Expires: 12/26/23  Plan of Care Reviewed with: patient    Subjective     Chief Complaint: None stated by Patient  Patient/Family Comments/goals: No family present.   Pain/Comfort:   None reported, demonstrated.     Objective:     Communicated with: RN prior to session.  Patient found  semi recumbent, back rest < 20 degrees.   with peripheral IV, Condom Catheter, telemetry upon OT entry to room.    General Precautions: Standard, aspiration, fall, diabetic, vision impaired    Orthopedic Precautions:N/A  Braces: N/A  Respiratory Status: Room air     Occupational Performance:     Bed Mobility:    Patient completed Rolling/Turning to Right with maximal assistance  Patient completed Scooting/Bridging with maximal  assistance  Patient completed Supine to Sit with maximal assistance     Functional Mobility/Transfers: UT 2* obtundity, poor direction following.       Activities of Daily Living:  Feeding:  moderate assistance freq VC to regain focal attention. 50% completion prior to (-) changes in response times. Intake halted, oral clearance checked, cavity clear.    Grooming: maximal assistance long sitting with min cog assist  Upper Body Dressing: maximal assistance gown  Lower Body Dressing: total assistance foot coverings      [facility] 6 Click ADL: 9    Treatment & Education:  Orienting interventions provided: all needs (ADL/leisure occupational) within reach, blinds opened, tv on low stress channel selection, lights on, and door left open.    Patient agreeable to this therapy session. Lights on / shade open.  Edu provided re: role of OT and POC  Gen in room Bedside safety edu as it relates to seated reach for ADL related article retrieval.   ADL retraining / bed mobility training/ safety and energy conservation  Importance of EOB/OOB activity  Neuro: Pt performed dynamic sitting balance activities w/ inclusion of func reach component and visual scan cues to support recovery of ADL volition/independence.  Patient instructed in and completed BUE AROM therex, 1 x 10 reps: bicep curls, sh flexion, horizontal sh abd/add, punches.  PLAN: Provide skin/joint protection, bolster orientation, support effective respiration / intake with assisted positioning needs, advance mobility as indicated, monitor/report symptom trend/s.   SESSION'S END:  *General in room safety and (A)'d mobility advised, call button use reviewed/in hand.  *Questions/concerns within OT scope addressed.  Patient left HOB elevated with all lines intact and RN notified.    GOALS:   Multidisciplinary Problems       Occupational Therapy Goals          Problem: Occupational Therapy    Goal Priority Disciplines Outcome Interventions   Occupational Therapy Goal     OT,  PT/OT Ongoing, Progressing    Description: Goals to be met by: 12/25/23     Patient will increase functional independence with ADLs by performing:    Feeding with Modified Bates.  UE Dressing with Modified Bates.  LE Dressing with Stand-by Assistance.  Grooming while seated with Supervision.  Supine to sit with Supervision.  Upper extremity exercise program x10 reps per handout, with supervision.  Toilet transfer to bedside commode via stand pivot with Minimal Assistance    Toileting from bedside commode for hygiene and clothing management with Minimal Assistance  Stand pivot transfer with Minimal Assistance  W/c manual propulsion ~50 ft to increase functional mobility within household for ADL participation      Step transfer- to be determined pending further pt participation                          Time Tracking:     OT Date of Treatment: 12/15/23  OT Start Time: 1319  OT Stop Time: 1349  OT Total Time (min): 30 min    Billable Minutes:Self Care/Self Management 18  Neuromuscular Re-education 12      12/15/2023

## 2023-12-16 PROBLEM — Z75.8 DISCHARGE PLANNING ISSUES: Status: ACTIVE | Noted: 2023-12-16

## 2023-12-16 PROBLEM — Z02.9 DISCHARGE PLANNING ISSUES: Status: ACTIVE | Noted: 2023-12-16

## 2023-12-16 LAB
POCT GLUCOSE: 125 MG/DL (ref 70–110)
POCT GLUCOSE: 248 MG/DL (ref 70–110)
POCT GLUCOSE: 272 MG/DL (ref 70–110)
POCT GLUCOSE: 272 MG/DL (ref 70–110)
POCT GLUCOSE: 324 MG/DL (ref 70–110)
POCT GLUCOSE: 418 MG/DL (ref 70–110)

## 2023-12-16 PROCEDURE — 25000003 PHARM REV CODE 250: Performed by: FAMILY MEDICINE

## 2023-12-16 PROCEDURE — 25000003 PHARM REV CODE 250: Performed by: INTERNAL MEDICINE

## 2023-12-16 PROCEDURE — 63600175 PHARM REV CODE 636 W HCPCS: Performed by: HOSPITALIST

## 2023-12-16 PROCEDURE — 25000003 PHARM REV CODE 250: Performed by: HOSPITALIST

## 2023-12-16 PROCEDURE — 25000003 PHARM REV CODE 250: Performed by: PEDIATRICS

## 2023-12-16 PROCEDURE — 63600175 PHARM REV CODE 636 W HCPCS: Performed by: INTERNAL MEDICINE

## 2023-12-16 PROCEDURE — A4216 STERILE WATER/SALINE, 10 ML: HCPCS | Performed by: HOSPITALIST

## 2023-12-16 PROCEDURE — 11000001 HC ACUTE MED/SURG PRIVATE ROOM

## 2023-12-16 RX ORDER — AMOXICILLIN 250 MG
1 CAPSULE ORAL DAILY PRN
Status: DISCONTINUED | OUTPATIENT
Start: 2023-12-16 | End: 2023-12-24 | Stop reason: HOSPADM

## 2023-12-16 RX ORDER — INSULIN ASPART 100 [IU]/ML
10 INJECTION, SOLUTION INTRAVENOUS; SUBCUTANEOUS ONCE
Status: COMPLETED | OUTPATIENT
Start: 2023-12-16 | End: 2023-12-16

## 2023-12-16 RX ADMIN — CARBAMAZEPINE 400 MG: 100 TABLET, EXTENDED RELEASE ORAL at 08:12

## 2023-12-16 RX ADMIN — INSULIN ASPART 5 UNITS: 100 INJECTION, SOLUTION INTRAVENOUS; SUBCUTANEOUS at 04:12

## 2023-12-16 RX ADMIN — ATORVASTATIN CALCIUM 20 MG: 10 TABLET, FILM COATED ORAL at 08:12

## 2023-12-16 RX ADMIN — POLYETHYLENE GLYCOL 3350 17 G: 17 POWDER, FOR SOLUTION ORAL at 08:12

## 2023-12-16 RX ADMIN — Medication 10 ML: at 05:12

## 2023-12-16 RX ADMIN — Medication 10 ML: at 11:12

## 2023-12-16 RX ADMIN — LOSARTAN POTASSIUM 25 MG: 25 TABLET, FILM COATED ORAL at 08:12

## 2023-12-16 RX ADMIN — INSULIN ASPART 6 UNITS: 100 INJECTION, SOLUTION INTRAVENOUS; SUBCUTANEOUS at 04:12

## 2023-12-16 RX ADMIN — INSULIN ASPART 5 UNITS: 100 INJECTION, SOLUTION INTRAVENOUS; SUBCUTANEOUS at 08:12

## 2023-12-16 RX ADMIN — CIPROFLOXACIN 500 MG: 500 TABLET, FILM COATED ORAL at 08:12

## 2023-12-16 RX ADMIN — RISPERIDONE 1 MG: 1 TABLET ORAL at 08:12

## 2023-12-16 RX ADMIN — Medication 10 ML: at 04:12

## 2023-12-16 RX ADMIN — METOPROLOL SUCCINATE ER TABLETS 25 MG: 25 TABLET, FILM COATED, EXTENDED RELEASE ORAL at 08:12

## 2023-12-16 RX ADMIN — CEFTRIAXONE 1 G: 1 INJECTION, POWDER, FOR SOLUTION INTRAMUSCULAR; INTRAVENOUS at 09:12

## 2023-12-16 RX ADMIN — CLOPIDOGREL BISULFATE 75 MG: 75 TABLET ORAL at 08:12

## 2023-12-16 RX ADMIN — VENLAFAXINE HYDROCHLORIDE 75 MG: 75 CAPSULE, EXTENDED RELEASE ORAL at 08:12

## 2023-12-16 RX ADMIN — INSULIN ASPART 6 UNITS: 100 INJECTION, SOLUTION INTRAVENOUS; SUBCUTANEOUS at 11:12

## 2023-12-16 RX ADMIN — INSULIN ASPART 5 UNITS: 100 INJECTION, SOLUTION INTRAVENOUS; SUBCUTANEOUS at 11:12

## 2023-12-16 RX ADMIN — INSULIN ASPART 10 UNITS: 100 INJECTION, SOLUTION INTRAVENOUS; SUBCUTANEOUS at 08:12

## 2023-12-16 RX ADMIN — INSULIN ASPART 10 UNITS: 100 INJECTION, SOLUTION INTRAVENOUS; SUBCUTANEOUS at 10:12

## 2023-12-16 RX ADMIN — ASPIRIN 81 MG CHEWABLE TABLET 81 MG: 81 TABLET CHEWABLE at 08:12

## 2023-12-16 RX ADMIN — CARBAMAZEPINE 200 MG: 100 TABLET, EXTENDED RELEASE ORAL at 08:12

## 2023-12-16 NOTE — NURSING
[facility]  Nurses Note -- 4 Eyes      12/16/2023       Skin assessed on: Q Shift      [] No Pressure Injuries Present    []Prevention Measures Documented    [x] Yes LDA  for Pressure Injury Previously documented     [] Yes New Pressure Injury Discovered   [] LDA for New Pressure Injury Added      Attending RN:  Kwesi Bennett, RN     Second RN:  Michelle

## 2023-12-16 NOTE — PLAN OF CARE
Problem: Diabetes Comorbidity  Goal: Blood Glucose Level Within Targeted Range  12/16/2023 1659 by Kwesi Bennett RN  Outcome: Ongoing, Not Progressing  12/16/2023 1659 by Kwesi Bennett RN  Outcome: Ongoing, Not Progressing     Problem: Impaired Wound Healing  Goal: Optimal Wound Healing  Outcome: Ongoing, Not Progressing     Problem: Fall Injury Risk  Goal: Absence of Fall and Fall-Related Injury  Outcome: Ongoing, Not Progressing     Problem: Thought Process Alteration  Goal: Optimal Thought Clarity  Outcome: Ongoing, Not Progressing   Encouraged to perform own ADLs, pt refuses or unable to at this time. Remains in bed all day. Pt oriented to self and place. Pt encouraged and assisted with feedings.

## 2023-12-16 NOTE — NURSING
@2046 pt blood sugar 79, pt was given 2 cups of juice, rechecked blood sugar 131. Notified KYREE Ness; ok to hold scheduled Levemir 18 units. Will cont to monitor

## 2023-12-16 NOTE — NURSING
Pt resting in bed asleep. No distress noted. PICC line flushed. Safety measures maintained. Will cont to monitor

## 2023-12-16 NOTE — SUBJECTIVE & OBJECTIVE
Interval History: Pt noted to be afebrile and hemodynamically stable. Stable respiratory status. No acute events overnight. Pt wanting to go home but appears very weak. Plan to dc to SNF.       Review of Systems   Constitutional:  Positive for activity change and fatigue. Negative for fever.   Respiratory:  Negative for cough and shortness of breath.    Cardiovascular:  Negative for chest pain.   Gastrointestinal:  Negative for abdominal pain.   Neurological:  Negative for dizziness and headaches.   Psychiatric/Behavioral:  Negative for confusion.    All other systems reviewed and are negative.    Objective:     Vital Signs (Most Recent):  Temp: 97.9 °F (36.6 °C) (12/15/23 2333)  Pulse: 77 (12/15/23 2333)  Resp: 18 (12/15/23 2333)  BP: 120/73 (12/15/23 2333)  SpO2: (!) 93 % (12/15/23 2333) Vital Signs (24h Range):  Temp:  [97.9 °F (36.6 °C)-99 °F (37.2 °C)] 97.9 °F (36.6 °C)  Pulse:  [77-86] 77  Resp:  [18-20] 18  SpO2:  [93 %-98 %] 93 %  BP: (120-135)/(65-78) 120/73     Weight: 60.9 kg (134 lb 4.2 oz)  Body mass index is 19.26 kg/m².    Intake/Output Summary (Last 24 hours) at 12/16/2023 0207  Last data filed at 12/15/2023 1754  Gross per 24 hour   Intake 360 ml   Output 600 ml   Net -240 ml         Physical Exam  Vitals and nursing note reviewed.   Constitutional:       Appearance: Normal appearance.      Comments: Globally weak    HENT:      Head: Normocephalic and atraumatic.   Eyes:      Extraocular Movements: Extraocular movements intact.      Pupils: Pupils are equal, round, and reactive to light.   Cardiovascular:      Rate and Rhythm: Normal rate and regular rhythm.   Pulmonary:      Effort: Pulmonary effort is normal. No respiratory distress.   Abdominal:      General: Abdomen is flat. There is no distension.   Musculoskeletal:         General: Normal range of motion.      Cervical back: Normal range of motion.   Neurological:      General: No focal deficit present.      Mental Status: He is alert and  oriented to person, place, and time.   Psychiatric:         Mood and Affect: Mood normal.         Behavior: Behavior normal.             Significant Labs: All pertinent labs within the past 24 hours have been reviewed.  CBC:   Recent Labs   Lab 12/14/23  0550   WBC 6.67   HGB 10.8*   HCT 34.2*        CMP:   Recent Labs   Lab 12/14/23  0550      K 3.7      CO2 27   *   BUN 12   CREATININE 0.8   CALCIUM 8.8   PROT 7.4   ALBUMIN 2.8*   BILITOT 0.2   ALKPHOS 106   AST 15   ALT 18   ANIONGAP 9       Significant Imaging: I have reviewed all pertinent imaging results/findings within the past 24 hours.

## 2023-12-16 NOTE — NURSING
Bedside Report given to night nurse FREDERIC Martinez. Walking rounds completed. Visualized and assessed patient NAD noted. Safety precautions maintained and call light within reach.     Chart check completed.

## 2023-12-16 NOTE — PROGRESS NOTES
[facility] Medicine  Telemedicine Progress Note    Patient Name: Cali Mabry  MRN: [id number]  Patient Class: IP- Inpatient   Admission Date: 12/10/2023  Length of Stay: 6 days  Attending Physician: Lor Fernandez MD  Primary Care Provider: Lawanda Flores NP          Subjective:     Principal Problem:Diabetic ketoacidosis without coma        HPI:    Cali Mabry is a 58 y.o. male who has a past medical history of Acute on chronic pancreatitis, Addiction to drug, CHF, DM type 1, HCV, HTN (hypertension), psychiatric care, IVDU (intravenous drug user), Pancreatitis, Psychiatric problem, Stroke, and Substance abuse, presented to the ED with CC of Generalized Fatigue.     Patient is unable to give much of a history. He says he takes his insulin regularly, but unable to give me even a guess as to how much he takes. Nurse states he told her he has not taken any insulin for the last couple days. He was BIB [facility] after he slid out of his chair and unable to get up. Given patient being a poor historian and no family at bedside, will put information gathered by ED:     Per chart review, patient was seen at [facility] on 11/05 for generalized weakness. Per [facility] review[facility]. The patient left AMA on both ED visits. Patient notes left hand trauma s/p falling out a chair. Denies head trauma and LOC. He endorses associated nausea, vomiting, diarrhea, general abdominal pain, fatigue, and visual disturbance. Patient notes compliance with insulin lispro and insulin detemir U-100. No medication PTA. No other exacerbating or alleviating factors. Patient denies fever, chills, headache, or other associated symptoms.     Patient currently denies any CP, AP or SOB.     Overview/Hospital Course:  Mr. Mabry is a pleasant 58 old gentleman with a past history TBI secondary to bipolar disorder, CHF, diabetes, hepatitis-C, hypertension, history of  polysubstance abuse including IVDU was admitted to the hospital for DKA.  DKA resolved insulin drip protocol.  Patient is seen by psychiatry services and recommended to follow up with outpatient psychiatric clinic, no need for pec or inpatient psychiatric admission at this time.   to assist with setting up outpatient follow up appointment with Psychiatry.    Interval History: Pt noted to be afebrile and hemodynamically stable. Stable respiratory status. No acute events overnight. Pt wanting to go home but appears very weak. Plan to dc to SNF.       Review of Systems   Constitutional:  Positive for activity change and fatigue. Negative for fever.   Respiratory:  Negative for cough and shortness of breath.    Cardiovascular:  Negative for chest pain.   Gastrointestinal:  Negative for abdominal pain.   Neurological:  Negative for dizziness and headaches.   Psychiatric/Behavioral:  Negative for confusion.    All other systems reviewed and are negative.    Objective:     Vital Signs (Most Recent):  Temp: 97.9 °F (36.6 °C) (12/15/23 2333)  Pulse: 77 (12/15/23 2333)  Resp: 18 (12/15/23 2333)  BP: 120/73 (12/15/23 2333)  SpO2: (!) 93 % (12/15/23 2333) Vital Signs (24h Range):  Temp:  [97.9 °F (36.6 °C)-99 °F (37.2 °C)] 97.9 °F (36.6 °C)  Pulse:  [77-86] 77  Resp:  [18-20] 18  SpO2:  [93 %-98 %] 93 %  BP: (120-135)/(65-78) 120/73     Weight: 60.9 kg (134 lb 4.2 oz)  Body mass index is 19.26 kg/m².    Intake/Output Summary (Last 24 hours) at 12/16/2023 0207  Last data filed at 12/15/2023 1754  Gross per 24 hour   Intake 360 ml   Output 600 ml   Net -240 ml         Physical Exam  Vitals and nursing note reviewed.   Constitutional:       Appearance: Normal appearance.      Comments: Globally weak    HENT:      Head: Normocephalic and atraumatic.   Eyes:      Extraocular Movements: Extraocular movements intact.      Pupils: Pupils are equal, round, and reactive to light.   Cardiovascular:      Rate and Rhythm: Normal  rate and regular rhythm.   Pulmonary:      Effort: Pulmonary effort is normal. No respiratory distress.   Abdominal:      General: Abdomen is flat. There is no distension.   Musculoskeletal:         General: Normal range of motion.      Cervical back: Normal range of motion.   Neurological:      General: No focal deficit present.      Mental Status: He is alert and oriented to person, place, and time.   Psychiatric:         Mood and Affect: Mood normal.         Behavior: Behavior normal.             Significant Labs: All pertinent labs within the past 24 hours have been reviewed.  CBC:   Recent Labs   Lab 12/14/23  0550   WBC 6.67   HGB 10.8*   HCT 34.2*        CMP:   Recent Labs   Lab 12/14/23  0550      K 3.7      CO2 27   *   BUN 12   CREATININE 0.8   CALCIUM 8.8   PROT 7.4   ALBUMIN 2.8*   BILITOT 0.2   ALKPHOS 106   AST 15   ALT 18   ANIONGAP 9       Significant Imaging: I have reviewed all pertinent imaging results/findings within the past 24 hours.    Assessment/Plan:      * Diabetic ketoacidosis without coma  Patient's FSGs are uncontrolled due to hyperglycemia on current medication regimen.  Last A1c reviewed:  Lab Results   Component Value Date    HGBA1C 11.3 (H) 12/10/2023     Antihyperglycemics (From admission, onward)      Start     Stop Route Frequency Ordered    12/14/23 2100  insulin detemir U-100 (Levemir) pen 18 Units  (Order Panel)         -- SubQ 2 times daily 12/14/23 1554    12/13/23 1130  insulin aspart U-100 pen 5 Units  (Order Panel)         -- SubQ 3 times daily with meals 12/13/23 0724    12/11/23 0249  insulin aspart U-100 pen 1-10 Units  (Order Panel)         -- SubQ Before meals & nightly PRN 12/11/23 0150          Hold Oral hypoglycemics while patient is in the hospital.  Patient reports that he does not know how much insulin he takes at home  Insulin infusion initiated upon admission; no longer requiring insulin gtt  Continue to monitor glucose trend and  titrate insulin regimen as appropriate.    Discharge planning issues  -Patient noted to be medically stable for discharge at this time  -Has been able to work with OT/PT who recommend placement for further rehabilitation  -Patient pending placement, continue in-hospital care as stated above in the meantime  -More than 20 minutes of my time has been spent discussing and planning just her safe disposition with patient/family/CM/SW/Nursing staff (not including other time spent in clinical discussion and management)  -CM/SW following, appreciate input   -Will place DC orders once placement has been secured        Acute cystitis without hematuria  -Urine culture from 12/10/2023 grew pan-sensitive Proteus. Doing well with ceftriaxone; will transition to oral course.    Bipolar disorder  Noted in history. Continue home medications: Risperidone, Venlafaxine, and Carbamazepine       Amphetamine use disorder, severe, dependence  Noted in history. No concern for withdrawal at this time.    Type 1 diabetes  See DKA note for plan  DKA resolved  Continue subq insulin     CAD (coronary artery disease)  Patient with known CAD. No chest pain concerns. Continue ASA, Statin, Plavix, BB.    COPD (chronic obstructive pulmonary disease)  Patient's COPD is controlled currently. Respiratory status stable.  Continue inhalers  prn  and monitor respiratory status closely.     Opioid abuse  Noted in history. No concern for withdrawal thus far. Continue to monitor.    Chronic hepatitis C  Noted history. Outpatient follow-up to discuss treatment options if desired.        VTE Risk Mitigation (From admission, onward)           Ordered     Place DAWIT hose  Until discontinued         12/10/23 1727     Place sequential compression device  Until discontinued         12/10/23 1727     IP VTE LOW RISK PATIENT  Once         12/10/23 1727                          I have completed this tele-visit without the assistance of a telepresenter.    The attending  portion of this evaluation, treatment, and documentation was performed per Lor Fernandez MD via Telemedicine AudioVisual using the secure [employer] software platform with 2 way audio/video. The provider was located off-site and the patient is located in the hospital. The aforementioned video software was utilized to document the relevant history and physical exam    Lor Fernandez MD  Department of Hospital Medicine   [facility]

## 2023-12-17 PROBLEM — R41.0 DELIRIUM: Status: ACTIVE | Noted: 2023-12-17

## 2023-12-17 LAB
ANION GAP SERPL CALC-SCNC: 11 MMOL/L (ref 8–16)
BASOPHILS # BLD AUTO: 0.02 K/UL (ref 0–0.2)
BASOPHILS NFR BLD: 0.2 % (ref 0–1.9)
BUN SERPL-MCNC: 26 MG/DL (ref 6–20)
CALCIUM SERPL-MCNC: 8.9 MG/DL (ref 8.7–10.5)
CHLORIDE SERPL-SCNC: 102 MMOL/L (ref 95–110)
CO2 SERPL-SCNC: 28 MMOL/L (ref 23–29)
CREAT SERPL-MCNC: 1 MG/DL (ref 0.5–1.4)
DIFFERENTIAL METHOD: ABNORMAL
EOSINOPHIL # BLD AUTO: 0.2 K/UL (ref 0–0.5)
EOSINOPHIL NFR BLD: 2 % (ref 0–8)
ERYTHROCYTE [DISTWIDTH] IN BLOOD BY AUTOMATED COUNT: 14 % (ref 11.5–14.5)
EST. GFR  (NO RACE VARIABLE): >60 ML/MIN/1.73 M^2
GLUCOSE SERPL-MCNC: 41 MG/DL (ref 70–110)
GLUCOSE SERPL-MCNC: 411 MG/DL (ref 70–110)
HCT VFR BLD AUTO: 36.9 % (ref 40–54)
HGB BLD-MCNC: 11.3 G/DL (ref 14–18)
IMM GRANULOCYTES # BLD AUTO: 0.08 K/UL (ref 0–0.04)
IMM GRANULOCYTES NFR BLD AUTO: 0.9 % (ref 0–0.5)
LYMPHOCYTES # BLD AUTO: 1.4 K/UL (ref 1–4.8)
LYMPHOCYTES NFR BLD: 16.1 % (ref 18–48)
MAGNESIUM SERPL-MCNC: 2.3 MG/DL (ref 1.6–2.6)
MCH RBC QN AUTO: 24.3 PG (ref 27–31)
MCHC RBC AUTO-ENTMCNC: 30.6 G/DL (ref 32–36)
MCV RBC AUTO: 79 FL (ref 82–98)
MONOCYTES # BLD AUTO: 0.7 K/UL (ref 0.3–1)
MONOCYTES NFR BLD: 7.6 % (ref 4–15)
NEUTROPHILS # BLD AUTO: 6.2 K/UL (ref 1.8–7.7)
NEUTROPHILS NFR BLD: 73.2 % (ref 38–73)
NRBC BLD-RTO: 0 /100 WBC
PHOSPHATE SERPL-MCNC: 3.2 MG/DL (ref 2.7–4.5)
PLATELET # BLD AUTO: 368 K/UL (ref 150–450)
PMV BLD AUTO: 8.6 FL (ref 9.2–12.9)
POCT GLUCOSE: 109 MG/DL (ref 70–110)
POCT GLUCOSE: 109 MG/DL (ref 70–110)
POCT GLUCOSE: 141 MG/DL (ref 70–110)
POCT GLUCOSE: 145 MG/DL (ref 70–110)
POCT GLUCOSE: 408 MG/DL (ref 70–110)
POCT GLUCOSE: 414 MG/DL (ref 70–110)
POCT GLUCOSE: 43 MG/DL (ref 70–110)
POCT GLUCOSE: 45 MG/DL (ref 70–110)
POCT GLUCOSE: 48 MG/DL (ref 70–110)
POCT GLUCOSE: 59 MG/DL (ref 70–110)
POCT GLUCOSE: 69 MG/DL (ref 70–110)
POCT GLUCOSE: >500 MG/DL (ref 70–110)
POTASSIUM SERPL-SCNC: 3.7 MMOL/L (ref 3.5–5.1)
RBC # BLD AUTO: 4.65 M/UL (ref 4.6–6.2)
SODIUM SERPL-SCNC: 141 MMOL/L (ref 136–145)
WBC # BLD AUTO: 8.52 K/UL (ref 3.9–12.7)

## 2023-12-17 PROCEDURE — 25000003 PHARM REV CODE 250: Performed by: STUDENT IN AN ORGANIZED HEALTH CARE EDUCATION/TRAINING PROGRAM

## 2023-12-17 PROCEDURE — 97110 THERAPEUTIC EXERCISES: CPT | Mod: CQ

## 2023-12-17 PROCEDURE — 25000003 PHARM REV CODE 250: Performed by: PEDIATRICS

## 2023-12-17 PROCEDURE — 82947 ASSAY GLUCOSE BLOOD QUANT: CPT | Performed by: HOSPITALIST

## 2023-12-17 PROCEDURE — 63600175 PHARM REV CODE 636 W HCPCS: Performed by: HOSPITALIST

## 2023-12-17 PROCEDURE — 11000001 HC ACUTE MED/SURG PRIVATE ROOM

## 2023-12-17 PROCEDURE — 80048 BASIC METABOLIC PNL TOTAL CA: CPT | Performed by: HOSPITALIST

## 2023-12-17 PROCEDURE — 25000003 PHARM REV CODE 250: Performed by: FAMILY MEDICINE

## 2023-12-17 PROCEDURE — 85025 COMPLETE CBC W/AUTO DIFF WBC: CPT | Performed by: HOSPITALIST

## 2023-12-17 PROCEDURE — 84100 ASSAY OF PHOSPHORUS: CPT | Performed by: HOSPITALIST

## 2023-12-17 PROCEDURE — 25000003 PHARM REV CODE 250: Performed by: HOSPITALIST

## 2023-12-17 PROCEDURE — 97530 THERAPEUTIC ACTIVITIES: CPT | Mod: CQ

## 2023-12-17 PROCEDURE — 83735 ASSAY OF MAGNESIUM: CPT | Performed by: HOSPITALIST

## 2023-12-17 PROCEDURE — A4216 STERILE WATER/SALINE, 10 ML: HCPCS | Performed by: HOSPITALIST

## 2023-12-17 RX ORDER — TALC
6 POWDER (GRAM) TOPICAL NIGHTLY
Status: DISCONTINUED | OUTPATIENT
Start: 2023-12-17 | End: 2023-12-24 | Stop reason: HOSPADM

## 2023-12-17 RX ORDER — FOLIC ACID 1 MG/1
1 TABLET ORAL DAILY
Status: DISCONTINUED | OUTPATIENT
Start: 2023-12-18 | End: 2023-12-24 | Stop reason: HOSPADM

## 2023-12-17 RX ORDER — IBUPROFEN 200 MG
16 TABLET ORAL
Status: DISCONTINUED | OUTPATIENT
Start: 2023-12-17 | End: 2023-12-24 | Stop reason: HOSPADM

## 2023-12-17 RX ORDER — LORAZEPAM 2 MG/ML
1 INJECTION INTRAMUSCULAR EVERY 6 HOURS PRN
Status: DISCONTINUED | OUTPATIENT
Start: 2023-12-17 | End: 2023-12-24 | Stop reason: HOSPADM

## 2023-12-17 RX ORDER — GLUCAGON 1 MG
1 KIT INJECTION
Status: DISCONTINUED | OUTPATIENT
Start: 2023-12-17 | End: 2023-12-24 | Stop reason: HOSPADM

## 2023-12-17 RX ORDER — POTASSIUM CHLORIDE 20 MEQ/1
40 TABLET, EXTENDED RELEASE ORAL ONCE
Status: COMPLETED | OUTPATIENT
Start: 2023-12-17 | End: 2023-12-17

## 2023-12-17 RX ORDER — IBUPROFEN 200 MG
24 TABLET ORAL
Status: DISCONTINUED | OUTPATIENT
Start: 2023-12-17 | End: 2023-12-24 | Stop reason: HOSPADM

## 2023-12-17 RX ORDER — INSULIN ASPART 100 [IU]/ML
10 INJECTION, SOLUTION INTRAVENOUS; SUBCUTANEOUS ONCE
Status: COMPLETED | OUTPATIENT
Start: 2023-12-17 | End: 2023-12-17

## 2023-12-17 RX ORDER — LANOLIN ALCOHOL/MO/W.PET/CERES
100 CREAM (GRAM) TOPICAL DAILY
Status: DISCONTINUED | OUTPATIENT
Start: 2023-12-18 | End: 2023-12-24 | Stop reason: HOSPADM

## 2023-12-17 RX ORDER — INSULIN ASPART 100 [IU]/ML
6 INJECTION, SOLUTION INTRAVENOUS; SUBCUTANEOUS
Status: DISCONTINUED | OUTPATIENT
Start: 2023-12-17 | End: 2023-12-21

## 2023-12-17 RX ORDER — INSULIN ASPART 100 [IU]/ML
0-5 INJECTION, SOLUTION INTRAVENOUS; SUBCUTANEOUS
Status: DISCONTINUED | OUTPATIENT
Start: 2023-12-17 | End: 2023-12-24 | Stop reason: HOSPADM

## 2023-12-17 RX ADMIN — ATORVASTATIN CALCIUM 20 MG: 10 TABLET, FILM COATED ORAL at 08:12

## 2023-12-17 RX ADMIN — ACETAMINOPHEN 650 MG: 325 TABLET ORAL at 10:12

## 2023-12-17 RX ADMIN — Medication 10 ML: at 11:12

## 2023-12-17 RX ADMIN — CARBAMAZEPINE 400 MG: 100 TABLET, EXTENDED RELEASE ORAL at 08:12

## 2023-12-17 RX ADMIN — DEXTROSE MONOHYDRATE 250 ML: 100 INJECTION, SOLUTION INTRAVENOUS at 07:12

## 2023-12-17 RX ADMIN — INSULIN ASPART 10 UNITS: 100 INJECTION, SOLUTION INTRAVENOUS; SUBCUTANEOUS at 12:12

## 2023-12-17 RX ADMIN — Medication 6 MG: at 08:12

## 2023-12-17 RX ADMIN — POTASSIUM CHLORIDE 40 MEQ: 1500 TABLET, EXTENDED RELEASE ORAL at 02:12

## 2023-12-17 RX ADMIN — METOPROLOL SUCCINATE ER TABLETS 25 MG: 25 TABLET, FILM COATED, EXTENDED RELEASE ORAL at 08:12

## 2023-12-17 RX ADMIN — LOSARTAN POTASSIUM 25 MG: 25 TABLET, FILM COATED ORAL at 08:12

## 2023-12-17 RX ADMIN — Medication 10 ML: at 12:12

## 2023-12-17 RX ADMIN — CARBAMAZEPINE 200 MG: 100 TABLET, EXTENDED RELEASE ORAL at 08:12

## 2023-12-17 RX ADMIN — Medication 10 ML: at 05:12

## 2023-12-17 RX ADMIN — CLOPIDOGREL BISULFATE 75 MG: 75 TABLET ORAL at 08:12

## 2023-12-17 RX ADMIN — INSULIN ASPART 5 UNITS: 100 INJECTION, SOLUTION INTRAVENOUS; SUBCUTANEOUS at 12:12

## 2023-12-17 RX ADMIN — INSULIN ASPART 6 UNITS: 100 INJECTION, SOLUTION INTRAVENOUS; SUBCUTANEOUS at 05:12

## 2023-12-17 RX ADMIN — CIPROFLOXACIN 500 MG: 500 TABLET, FILM COATED ORAL at 08:12

## 2023-12-17 RX ADMIN — INSULIN ASPART 10 UNITS: 100 INJECTION, SOLUTION INTRAVENOUS; SUBCUTANEOUS at 02:12

## 2023-12-17 RX ADMIN — VENLAFAXINE HYDROCHLORIDE 75 MG: 75 CAPSULE, EXTENDED RELEASE ORAL at 08:12

## 2023-12-17 RX ADMIN — RISPERIDONE 1 MG: 1 TABLET ORAL at 08:12

## 2023-12-17 RX ADMIN — DEXTROSE MONOHYDRATE 125 ML: 100 INJECTION, SOLUTION INTRAVENOUS at 10:12

## 2023-12-17 RX ADMIN — ASPIRIN 81 MG CHEWABLE TABLET 81 MG: 81 TABLET CHEWABLE at 08:12

## 2023-12-17 NOTE — SUBJECTIVE & OBJECTIVE
Interval History: Pt noted to be afebrile and hemodynamically stable. Stable respiratory status. No acute events overnight. hyperglycemic this morning. Pending dc to SNF.       Review of Systems   Constitutional:  Positive for activity change and fatigue. Negative for fever.   Respiratory:  Negative for cough and shortness of breath.    Cardiovascular:  Negative for chest pain.   Gastrointestinal:  Negative for abdominal pain.   Neurological:  Negative for dizziness and headaches.   Psychiatric/Behavioral:  Negative for confusion.    All other systems reviewed and are negative.    Objective:     Vital Signs (Most Recent):  Temp: 98 °F (36.7 °C) (12/16/23 2309)  Pulse: 87 (12/16/23 2309)  Resp: 18 (12/16/23 2309)  BP: (!) 158/87 (12/16/23 2309)  SpO2: (!) 94 % (12/16/23 2309) Vital Signs (24h Range):  Temp:  [97.5 °F (36.4 °C)-98.7 °F (37.1 °C)] 98 °F (36.7 °C)  Pulse:  [84-95] 87  Resp:  [18] 18  SpO2:  [93 %-96 %] 94 %  BP: (121-171)/(66-89) 158/87     Weight: 60.9 kg (134 lb 4.2 oz)  Body mass index is 19.26 kg/m².    Intake/Output Summary (Last 24 hours) at 12/17/2023 0004  Last data filed at 12/16/2023 1638  Gross per 24 hour   Intake 0 ml   Output 952 ml   Net -952 ml           Physical Exam  Vitals and nursing note reviewed.   Constitutional:       Appearance: Normal appearance.      Comments: Globally weak    HENT:      Head: Normocephalic and atraumatic.   Eyes:      Extraocular Movements: Extraocular movements intact.      Pupils: Pupils are equal, round, and reactive to light.   Cardiovascular:      Rate and Rhythm: Normal rate and regular rhythm.   Pulmonary:      Effort: Pulmonary effort is normal. No respiratory distress.   Abdominal:      General: Abdomen is flat. There is no distension.   Musculoskeletal:         General: Normal range of motion.      Cervical back: Normal range of motion.   Neurological:      General: No focal deficit present.      Mental Status: He is alert and oriented to person,  "place, and time.   Psychiatric:         Mood and Affect: Mood normal.         Behavior: Behavior normal.             Significant Labs: All pertinent labs within the past 24 hours have been reviewed.  CBC:   No results for input(s): "WBC", "HGB", "HCT", "PLT" in the last 48 hours.    CMP:   No results for input(s): "NA", "K", "CL", "CO2", "GLU", "BUN", "CREATININE", "CALCIUM", "PROT", "ALBUMIN", "BILITOT", "ALKPHOS", "AST", "ALT", "ANIONGAP", "EGFRNONAA" in the last 48 hours.    Invalid input(s): "ESTGFAFRICA"      Significant Imaging: I have reviewed all pertinent imaging results/findings within the past 24 hours.  "

## 2023-12-17 NOTE — NURSING
Pt resting in bed asleep, no signs of pain/discomfort. Scheduled medications given w/out difficulty. PICC LUANA saline lock. Tele #[id number]. Pt remains free from fall/injury. Vitals assessed. Accu checks w/ insulin coverage. Pt on room air; no distress noted. Telesitter remains at bedside. Safety measures maintained. Will cont to monitor

## 2023-12-17 NOTE — NURSING
Pt consumed his grits and juice, half a cup of coffee. Appears to be pocketing food, does better with pureed foods. WOODY at this time.

## 2023-12-17 NOTE — PLAN OF CARE
Problem: Adult Inpatient Plan of Care  Goal: Optimal Comfort and Wellbeing  Outcome: Ongoing, Progressing     Problem: Diabetes Comorbidity  Goal: Blood Glucose Level Within Targeted Range  Outcome: Ongoing, not Progressing     Problem: Skin Injury Risk Increased  Goal: Skin Health and Integrity  Outcome: Ongoing, Progressing     Problem: Thought Process Alteration  Goal: Optimal Thought Clarity  Outcome: Ongoing, not Progressing   Pt remains labile, confused at times, screaming for crackers and sugar, disorganized. BG remains unstable, MD alerted. Pt sat up for most of the day on recliner with assistance of PT. Pt assisted with feedings by staff. RN has fed patient breakfast and lunch, his mother assisted feeding him dinner.  Requiring more redirection than yesterday.
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
## 2023-12-17 NOTE — PT/OT/SLP PROGRESS
Physical Therapy Treatment    Patient Name:  Cali Mabry   MRN:  [id number]    Recommendations:     Discharge Recommendations: Moderate Intensity Therapy  Discharge Equipment Recommendations: bedside commode, bath bench, wheelchair  Barriers to discharge:  (not at PLOF; high risk of falls)     Assessment:     Cali Mabry is a 58 y.o. male admitted with a medical diagnosis of Diabetic ketoacidosis without coma.  He presents with the following impairments/functional limitations: weakness, impaired endurance, impaired self care skills, impaired balance, gait instability, decreased safety awareness, impaired functional mobility, decreased lower extremity function, impaired skin, decreased upper extremity function, decreased coordination, decreased ROM.    Pt tolerated treatment fairly this date. Pt is easily agitated and requires verbal cues for re-direction. Pt was easily able to be re-directed. Pt performed bed mobility with Mod A, transfers with RW, Min A and took ~5-6 steps from EOB to BSchair with RW, Mod A. Pt displays decreased coordination with transfers and ambulation. Pt displays L lateral and posterior leaning with static and dynamic sitting/standing. Pt is a high fall risk and requires assistance for all OOB activity. Pt is unaware of safety deficits.Pt would continue to benefit from skilled PT services to address pt's deficits and improve current level of function.     Rehab Prognosis: Fair; patient would benefit from acute skilled PT services to address these deficits and reach maximum level of function.    Recent Surgery: * No surgery found *      Plan:     During this hospitalization, patient to be seen 3 x/week to address the identified rehab impairments via gait training, therapeutic exercises, therapeutic activities and progress toward the following goals:    Plan of Care Expires:  12/18/23    Subjective     Chief Complaint: wanting to use a cane  Patient/Family Comments/goals: Pt wants to  use a cane. Tells therapist not to pronounce his last name. Pt educated on safety issues with use of SPC vs RW at this time. Fair understanding.   Pain/Comfort:  Pain Rating 1:  (yes, did not rate)  Location 1: back  Pain Addressed 1: Pre-medicate for activity, Distraction, Cessation of Activity, Nurse notified      Objective:     Communicated with pt's nurse, Kwesi and charge nurse Lawanda prior to session.  Patient found HOB elevated, mom present with peripheral IV, Condom Catheter, telemetry upon PT entry to room.     General Precautions: Standard, aspiration, fall, diabetic, vision impaired  Orthopedic Precautions: N/A  Braces: N/A  Respiratory Status: Room air     Functional Mobility:  Bed Mobility:     Scooting: Mod A to scoot to EOB for foot placement  Supine to Sit: Mod A for BLEs and trunk   Transfers: x2 trials elevated EOB    Sit to Stand:  minimum assistance with rolling walker  Bed to Chair: moderate assistance with  rolling walker  using  Step Transfer  Gait: Pt took ~1 step fwd/~1 step back and ~5-6 steps from EOB>BSchair. Pt displays decreased LE coordination/placement with ambulation. Pt required verbal and tactile cues for advancement of LEs due to decreased initiation. Displays crossing over midline with BLEs, decreased xiomara, step length, velocity of limb, postural control, foot clearance, endurance and balance.   Balance: fair- sitting and fair- dynamic standing      AM-PAC 6 CLICK MOBILITY  Turning over in bed (including adjusting bedclothes, sheets and blankets)?: 2  Sitting down on and standing up from a chair with arms (e.g., wheelchair, bedside commode, etc.): 3  Moving from lying on back to sitting on the side of the bed?: 2  Moving to and from a bed to a chair (including a wheelchair)?: 2  Need to walk in hospital room?: 2  Climbing 3-5 steps with a railing?: 1  Basic Mobility Total Score: 12       Treatment & Education:  Seated therex to BLEs x10 reps: Marching, Ashley, isometric hip  abduction/adduction, heel raises/toe raises, Supine SLRs, Supine hip abd/add (Pt with complaints of L hip pain with hip abd and LAQs)    Pt educated on PTA role/POC.   Importance of OOB activity with staff assistance.  Importance of sitting up in the chair throughout the day as tolerated, especially for meals   Safety during functional t/f and mobility with use of RW and therapy staff  White board updated   Multiple self-care tasks/functional mobility completed- assistance level noted above   All questions/concerns answered within scope of practice  Pt encouraged to use call button for assistance for all OOB/OOchair activity 2/2 fall risk. Pt verbalized understanding. Tray table and all needs within reach.        Patient left  up in BSchair with pressure cushion and all needs within reach  with all lines intact, call button in reach, pt's nurse kari notified, and AVASYS and Mother present..Unable to attach chair alarm, due to not having charging cable. Pt's nurse notified.     GOALS:   Multidisciplinary Problems       Physical Therapy Goals          Problem: Physical Therapy    Goal Priority Disciplines Outcome Goal Variances Interventions   Physical Therapy Goal     PT, PT/OT Ongoing, Progressing     Description: Goals to be met by: 23     Patient will increase functional independence with mobility by performin. Pt to be min A with bed mobility.  2. Pt to transfer with min A.  3. Pt to ambulate 50' w/RW CGA.  4. Pt to propel wheelchair 50' w/SBA.  5. Pt to be able to tolerate 1x10 reps BLE exs with Vcs.                         Time Tracking:     PT Received On: 23  PT Start Time: 1032     PT Stop Time: 1100  PT Total Time (min): 28 min     Billable Minutes: Therapeutic Activity 18 and Therapeutic Exercise 10    Treatment Type: Treatment  PT/PTA: PTA     Number of PTA visits since last PT visit: 1     2023

## 2023-12-17 NOTE — NURSING
Received pt in no distress. Flat affect, denies any discomfort except that he's cold. BG at time of report 45, given orange juice, rechecked then later 59. MD sent a secure chat for new orders.     Rechecked Bg 109, Pt continues to deny any distress.

## 2023-12-17 NOTE — PLAN OF CARE
Problem: Physical Therapy  Goal: Physical Therapy Goal  Description: Goals to be met by: 23     Patient will increase functional independence with mobility by performin. Pt to be min A with bed mobility.  2. Pt to transfer with min A.  3. Pt to ambulate 50' w/RW CGA.  4. Pt to propel wheelchair 50' w/SBA.  5. Pt to be able to tolerate 1x10 reps BLE exs with Vcs.    Outcome: Ongoing, Progressing     Pt tolerated treatment fairly this date. Pt is easily agitated and requires verbal cues for re-direction. Pt was easily able to be re-directed. Pt performed bed mobility with Mod A, transfers with RW, Min A and took ~5-6 steps from EOB to BSchair with RW, Mod A. Pt displays decreased coordination with transfers and ambulation. Pt displays L lateral and posterior leaning with static and dynamic sitting/standing. Pt would continue to benefit from skilled PT services to address pt's deficits and improve current level of function.

## 2023-12-17 NOTE — PROGRESS NOTES
[facility]  Telemedicine Progress Note    Patient Name: Cali Mabry  MRN: [id number]  Patient Class: IP- Inpatient   Admission Date: 12/10/2023  Length of Stay: 7 days  Attending Physician: Lor Fernandez MD  Primary Care Provider: Lawanda Flores NP          Subjective:     Principal Problem:Diabetic ketoacidosis without coma        HPI:    Cali Mabry is a 58 y.o. male who has a past medical history of Acute on chronic pancreatitis, Addiction to drug, CHF, DM type 1, HCV, HTN (hypertension), psychiatric care, IVDU (intravenous drug user), Pancreatitis, Psychiatric problem, Stroke, and Substance abuse, presented to the ED with CC of Generalized Fatigue.     Patient is unable to give much of a history. He says he takes his insulin regularly, but unable to give me even a guess as to how much he takes. Nurse states he told her he has not taken any insulin for the last couple days. He was BIB [facility] after he slid out of his chair and unable to get up. Given patient being a poor historian and no family at bedside, will put information gathered by ED:     Per chart review, patient was [facility] at [facility] on 11/05 for generalized weakness. Per chart review, [facility]. The patient left AMA on both ED visits. Patient notes left hand trauma s/p falling out a chair. Denies head trauma and LOC. He endorses associated nausea, vomiting, diarrhea, general abdominal pain, fatigue, and visual disturbance. Patient notes compliance with insulin lispro and insulin detemir U-100. No medication PTA. No other exacerbating or alleviating factors. Patient denies fever, chills, headache, or other associated symptoms.     Patient currently denies any CP, AP or SOB.     Overview/Hospital Course:  Mr. Mabry is a pleasant 58 old gentleman with a past history TBI secondary to bipolar disorder, CHF, diabetes, hepatitis-C, hypertension, history of  polysubstance abuse including IVDU was admitted to the hospital for DKA.  DKA resolved insulin drip protocol.  Patient is seen by psychiatry services and recommended to follow up with outpatient psychiatric clinic, no need for pec or inpatient psychiatric admission at this time.   to assist with setting up outpatient follow up appointment with Psychiatry.    Interval History: Pt noted to be afebrile and hemodynamically stable. Stable respiratory status. No acute events overnight. hyperglycemic this morning. Pending dc to SNF.       Review of Systems   Constitutional:  Positive for activity change and fatigue. Negative for fever.   Respiratory:  Negative for cough and shortness of breath.    Cardiovascular:  Negative for chest pain.   Gastrointestinal:  Negative for abdominal pain.   Neurological:  Negative for dizziness and headaches.   Psychiatric/Behavioral:  Negative for confusion.    All other systems reviewed and are negative.    Objective:     Vital Signs (Most Recent):  Temp: 98 °F (36.7 °C) (12/16/23 2309)  Pulse: 87 (12/16/23 2309)  Resp: 18 (12/16/23 2309)  BP: (!) 158/87 (12/16/23 2309)  SpO2: (!) 94 % (12/16/23 2309) Vital Signs (24h Range):  Temp:  [97.5 °F (36.4 °C)-98.7 °F (37.1 °C)] 98 °F (36.7 °C)  Pulse:  [84-95] 87  Resp:  [18] 18  SpO2:  [93 %-96 %] 94 %  BP: (121-171)/(66-89) 158/87     Weight: 60.9 kg (134 lb 4.2 oz)  Body mass index is 19.26 kg/m².    Intake/Output Summary (Last 24 hours) at 12/17/2023 0004  Last data filed at 12/16/2023 1638  Gross per 24 hour   Intake 0 ml   Output 952 ml   Net -952 ml           Physical Exam  Vitals and nursing note reviewed.   Constitutional:       Appearance: Normal appearance.      Comments: Globally weak    HENT:      Head: Normocephalic and atraumatic.   Eyes:      Extraocular Movements: Extraocular movements intact.      Pupils: Pupils are equal, round, and reactive to light.   Cardiovascular:      Rate and Rhythm: Normal rate and regular  "rhythm.   Pulmonary:      Effort: Pulmonary effort is normal. No respiratory distress.   Abdominal:      General: Abdomen is flat. There is no distension.   Musculoskeletal:         General: Normal range of motion.      Cervical back: Normal range of motion.   Neurological:      General: No focal deficit present.      Mental Status: He is alert and oriented to person, place, and time.   Psychiatric:         Mood and Affect: Mood normal.         Behavior: Behavior normal.             Significant Labs: All pertinent labs within the past 24 hours have been reviewed.  CBC:   No results for input(s): "WBC", "HGB", "HCT", "PLT" in the last 48 hours.    CMP:   No results for input(s): "NA", "K", "CL", "CO2", "GLU", "BUN", "CREATININE", "CALCIUM", "PROT", "ALBUMIN", "BILITOT", "ALKPHOS", "AST", "ALT", "ANIONGAP", "EGFRNONAA" in the last 48 hours.    Invalid input(s): "ESTGFAFRICA"      Significant Imaging: I have reviewed all pertinent imaging results/findings within the past 24 hours.    Assessment/Plan:      * Diabetic ketoacidosis without coma  Patient's FSGs are uncontrolled due to hyperglycemia on current medication regimen.  Last A1c reviewed:  Lab Results   Component Value Date    HGBA1C 11.3 (H) 12/10/2023     Antihyperglycemics (From admission, onward)      Start     Stop Route Frequency Ordered    12/14/23 2100  insulin detemir U-100 (Levemir) pen 18 Units  (Order Panel)         -- SubQ 2 times daily 12/14/23 1554    12/13/23 1130  insulin aspart U-100 pen 5 Units  (Order Panel)         -- SubQ 3 times daily with meals 12/13/23 0724    12/11/23 0249  insulin aspart U-100 pen 1-10 Units  (Order Panel)         -- SubQ Before meals & nightly PRN 12/11/23 0150          Hold Oral hypoglycemics while patient is in the hospital.  Patient reports that he does not know how much insulin he takes at home  Insulin infusion initiated upon admission; no longer requiring insulin gtt  Continue to monitor glucose trend and titrate " insulin regimen as appropriate.    Discharge planning issues  -Patient noted to be medically stable for discharge at this time  -Has been able to work with OT/PT who recommend placement for further rehabilitation  -Patient pending placement, continue in-hospital care as stated above in the meantime  -More than 20 minutes of my time has been spent discussing and planning just her safe disposition with patient/family/CM/SW/Nursing staff (not including other time spent in clinical discussion and management)  -CM/SW following, appreciate input   -Will place DC orders once placement has been secured        Acute cystitis without hematuria  -Urine culture from 12/10/2023 grew pan-sensitive Proteus. Doing well with ceftriaxone; will transition to oral course.    Bipolar disorder  Noted in history. Continue home medications: Risperidone, Venlafaxine, and Carbamazepine       Amphetamine use disorder, severe, dependence  Noted in history. No concern for withdrawal at this time.    Type 1 diabetes  See DKA note for plan  DKA resolved  Continue subq insulin     CAD (coronary artery disease)  Patient with known CAD. No chest pain concerns. Continue ASA, Statin, Plavix, BB.    COPD (chronic obstructive pulmonary disease)  Patient's COPD is controlled currently. Respiratory status stable.  Continue inhalers  prn  and monitor respiratory status closely.     Opioid abuse  Noted in history. No concern for withdrawal thus far. Continue to monitor.    Chronic hepatitis C  Noted history. Outpatient follow-up to discuss treatment options if desired.        VTE Risk Mitigation (From admission, onward)           Ordered     Place DAWIT hose  Until discontinued         12/10/23 1727     Place sequential compression device  Until discontinued         12/10/23 1727     IP VTE LOW RISK PATIENT  Once         12/10/23 1727                          I have completed this tele-visit without the assistance of a telepresenter.    The attending portion  of this evaluation, treatment, and documentation was performed per Lor Fernandez MD via Telemedicine AudioVisual using the secure [employer] software platform with 2 way audio/video. The provider was located off-site and the patient is located in the hospital. The aforementioned video software was utilized to document the relevant history and physical exam    Lor Fernandez MD  Department of Hospital Medicine   [facility]

## 2023-12-17 NOTE — NURSING
[facility]  Nurses Note -- 4 Eyes      12/17/2023       Skin assessed on: Q Shift      [x] No Pressure Injuries Present    []Prevention Measures Documented    [] Yes LDA  for Pressure Injury Previously documented     [] Yes New Pressure Injury Discovered   [] LDA for New Pressure Injury Added      Attending RN:  Kwesi Bennett, RN     Second RN:  Michelle

## 2023-12-17 NOTE — NURSING
pt's BG back to the 400s, gave the 10 of lantus and 15 aspart. currently sitting up eating lunch with nurse assist. MD made aware.

## 2023-12-17 NOTE — NURSING
@0615 pt blood sugar is 41; no prn orders in place; orange juice given. Notified KEN Parada.   @0633 Rechecked BS 45; Pt is awake and alert. Will cont to monitor

## 2023-12-18 LAB
POCT GLUCOSE: 149 MG/DL (ref 70–110)
POCT GLUCOSE: 197 MG/DL (ref 70–110)
POCT GLUCOSE: 207 MG/DL (ref 70–110)
POCT GLUCOSE: 222 MG/DL (ref 70–110)
POCT GLUCOSE: 275 MG/DL (ref 70–110)
POCT GLUCOSE: 305 MG/DL (ref 70–110)
POCT GLUCOSE: 48 MG/DL (ref 70–110)

## 2023-12-18 PROCEDURE — 25000003 PHARM REV CODE 250: Performed by: FAMILY MEDICINE

## 2023-12-18 PROCEDURE — A4216 STERILE WATER/SALINE, 10 ML: HCPCS | Performed by: HOSPITALIST

## 2023-12-18 PROCEDURE — 63600175 PHARM REV CODE 636 W HCPCS: Performed by: STUDENT IN AN ORGANIZED HEALTH CARE EDUCATION/TRAINING PROGRAM

## 2023-12-18 PROCEDURE — 25000003 PHARM REV CODE 250: Performed by: HOSPITALIST

## 2023-12-18 PROCEDURE — 25000003 PHARM REV CODE 250: Performed by: STUDENT IN AN ORGANIZED HEALTH CARE EDUCATION/TRAINING PROGRAM

## 2023-12-18 PROCEDURE — 11000001 HC ACUTE MED/SURG PRIVATE ROOM

## 2023-12-18 PROCEDURE — 25000003 PHARM REV CODE 250: Performed by: PEDIATRICS

## 2023-12-18 PROCEDURE — 97110 THERAPEUTIC EXERCISES: CPT

## 2023-12-18 RX ADMIN — INSULIN ASPART 2 UNITS: 100 INJECTION, SOLUTION INTRAVENOUS; SUBCUTANEOUS at 04:12

## 2023-12-18 RX ADMIN — CIPROFLOXACIN 500 MG: 500 TABLET, FILM COATED ORAL at 09:12

## 2023-12-18 RX ADMIN — INSULIN ASPART 3 UNITS: 100 INJECTION, SOLUTION INTRAVENOUS; SUBCUTANEOUS at 12:12

## 2023-12-18 RX ADMIN — FOLIC ACID 1 MG: 1 TABLET ORAL at 08:12

## 2023-12-18 RX ADMIN — RISPERIDONE 1 MG: 1 TABLET ORAL at 08:12

## 2023-12-18 RX ADMIN — CARBAMAZEPINE 200 MG: 100 TABLET, EXTENDED RELEASE ORAL at 08:12

## 2023-12-18 RX ADMIN — DEXTROSE MONOHYDRATE 250 ML: 100 INJECTION, SOLUTION INTRAVENOUS at 01:12

## 2023-12-18 RX ADMIN — VENLAFAXINE HYDROCHLORIDE 75 MG: 75 CAPSULE, EXTENDED RELEASE ORAL at 08:12

## 2023-12-18 RX ADMIN — INSULIN ASPART 6 UNITS: 100 INJECTION, SOLUTION INTRAVENOUS; SUBCUTANEOUS at 07:12

## 2023-12-18 RX ADMIN — Medication 10 ML: at 05:12

## 2023-12-18 RX ADMIN — THERA TABS 1 TABLET: TAB at 08:12

## 2023-12-18 RX ADMIN — INSULIN ASPART 4 UNITS: 100 INJECTION, SOLUTION INTRAVENOUS; SUBCUTANEOUS at 07:12

## 2023-12-18 RX ADMIN — Medication 6 MG: at 09:12

## 2023-12-18 RX ADMIN — ATORVASTATIN CALCIUM 20 MG: 10 TABLET, FILM COATED ORAL at 08:12

## 2023-12-18 RX ADMIN — CIPROFLOXACIN 500 MG: 500 TABLET, FILM COATED ORAL at 08:12

## 2023-12-18 RX ADMIN — METOPROLOL SUCCINATE ER TABLETS 25 MG: 25 TABLET, FILM COATED, EXTENDED RELEASE ORAL at 08:12

## 2023-12-18 RX ADMIN — INSULIN ASPART 6 UNITS: 100 INJECTION, SOLUTION INTRAVENOUS; SUBCUTANEOUS at 04:12

## 2023-12-18 RX ADMIN — LOSARTAN POTASSIUM 25 MG: 25 TABLET, FILM COATED ORAL at 08:12

## 2023-12-18 RX ADMIN — CARBAMAZEPINE 400 MG: 100 TABLET, EXTENDED RELEASE ORAL at 09:12

## 2023-12-18 RX ADMIN — Medication 10 ML: at 11:12

## 2023-12-18 RX ADMIN — THIAMINE HCL TAB 100 MG 100 MG: 100 TAB at 08:12

## 2023-12-18 RX ADMIN — RISPERIDONE 1 MG: 1 TABLET ORAL at 09:12

## 2023-12-18 RX ADMIN — DEXTROSE MONOHYDRATE 250 ML: 100 INJECTION, SOLUTION INTRAVENOUS at 11:12

## 2023-12-18 RX ADMIN — Medication 10 ML: at 04:12

## 2023-12-18 RX ADMIN — INSULIN ASPART 6 UNITS: 100 INJECTION, SOLUTION INTRAVENOUS; SUBCUTANEOUS at 11:12

## 2023-12-18 RX ADMIN — CLOPIDOGREL BISULFATE 75 MG: 75 TABLET ORAL at 08:12

## 2023-12-18 RX ADMIN — INSULIN DETEMIR 10 UNITS: 100 INJECTION, SOLUTION SUBCUTANEOUS at 09:12

## 2023-12-18 RX ADMIN — INSULIN DETEMIR 10 UNITS: 100 INJECTION, SOLUTION SUBCUTANEOUS at 08:12

## 2023-12-18 RX ADMIN — ASPIRIN 81 MG CHEWABLE TABLET 81 MG: 81 TABLET CHEWABLE at 08:12
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
## 2023-12-18 NOTE — ASSESSMENT & PLAN NOTE
Noted in history. No concern for withdrawal at this time.  [facility] protocol

## 2023-12-18 NOTE — ASSESSMENT & PLAN NOTE
-Noted episodes of intermittent agitation and drowsiness.  -Continue correction of metabolic derangements  -scheduled melatonin q.h.s. to regulate sleep-wake cycle  -will monitor on CIWA protocol for today, unlikely to be acute alcohol withdrawals as patient has now been admitted here for more than 7 days.  -possibly related to chronic psychiatric issues  -Maintain Delirium precautions: Maintain regular sleep cycle. Early ambulation. Minimal interruptions overnight. Re-orient patient frequently. Maintain adequate boregimen, hydration and electrolyte replenishments. Hearing Aids and eye glasses as needed.

## 2023-12-18 NOTE — NURSING
[facility] [place]  Nurses Note -- 4 Eyes      12/18/2023       Skin assessed on: Q Shift      [x] No Pressure Injuries Present    [x]Prevention Measures Documented    [] Yes LDA  for Pressure Injury Previously documented     [] Yes New Pressure Injury Discovered   [] LDA for New Pressure Injury Added      Attending RN:  Nalini Roberts RN     Second RN:  RITA Pérez

## 2023-12-18 NOTE — PLAN OF CARE
Plan for patient discharge to SNF. No current accepting facility at this time. Awaiting Level II/142. Plan B to discharge with home health and ACT team, contact Tamia [phone].     Attempted to speak with pt at bedside, pt sleeping comfortably, unable to awake at this time. Placed call to patient's mother, left detailed voice message.     3:15pm Spoke with Nicole with [facility] ([phone] for follow up on Level II/142, stated noted that request had been submitted, however documents have not been received.     Placed call to [facility], ([phone]), no answer. Requested documentation re-faxed to [facility] [phone] for review.     3:32pm Spoke with pt's mother regardign dc planning. Pt's mother asked if CM could speak with her nephew Jay, due to language barrier.     CM placed call to Jay, [phone], left detailed voice message. CM to continue to follow for dc needs.    12/18/23 1134   Discharge Reassessment   Assessment Type Discharge Planning Reassessment   Did the patient's condition or plan change since previous assessment? No   Communicated ROLA with patient/caregiver Date not available/Unable to determine   Discharge Plan A Skilled Nursing Facility   Discharge Plan B Home Health;Home with family   DME Needed Upon Discharge  bedside commode;bath bench;wheelchair   Transition of Care Barriers Social;Does not adhere to care plan;Other (see comments)   Post-Acute Status   Post-Acute Authorization Placement   Post-Acute Placement Status Pending state direction/certification   Discharge Delays (!) Post-Acute Set-up

## 2023-12-18 NOTE — ASSESSMENT & PLAN NOTE
Patient's FSGs are uncontrolled due to hyperglycemia on current medication regimen.  Last A1c reviewed:  Lab Results   Component Value Date    HGBA1C 11.3 (H) 12/10/2023     Antihyperglycemics (From admission, onward)    Start     Stop Route Frequency Ordered    12/18/23 0900  insulin detemir U-100 (Levemir) pen 10 Units  (Order Panel)         -- SubQ 2 times daily 12/17/23 2226    12/17/23 2048  insulin aspart U-100 pen 0-5 Units         -- SubQ Before meals & nightly PRN 12/17/23 1948    12/17/23 1645  insulin aspart U-100 pen 6 Units  (Order Panel)         -- SubQ 3 times daily with meals 12/17/23 1241        Hold Oral hypoglycemics while patient is in the hospital.  Patient reports that he does not know how much insulin he takes at home  Insulin infusion initiated upon admission; no longer requiring insulin gtt  Continue to monitor glucose trend and titrate insulin regimen as appropriate.  Pt has epidoes of hypoglycemia and hyperglyemia  POCT BG q4h  Insulin dose adjsuted

## 2023-12-18 NOTE — ASSESSMENT & PLAN NOTE
Patient's FSGs are uncontrolled due to hyperglycemia on current medication regimen.  Last A1c reviewed:  Lab Results   Component Value Date    HGBA1C 11.3 (H) 12/10/2023     Antihyperglycemics (From admission, onward)      Start     Stop Route Frequency Ordered    12/17/23 2100  insulin detemir U-100 (Levemir) pen 20 Units  (Order Panel)         -- SubQ 2 times daily 12/17/23 1241    12/17/23 1645  insulin aspart U-100 pen 6 Units  (Order Panel)         -- SubQ 3 times daily with meals 12/17/23 1241    12/11/23 0249  insulin aspart U-100 pen 1-10 Units  (Order Panel)         -- SubQ Before meals & nightly PRN 12/11/23 0150          Hold Oral hypoglycemics while patient is in the hospital.  Patient reports that he does not know how much insulin he takes at home  Insulin infusion initiated upon admission; no longer requiring insulin gtt  Continue to monitor glucose trend and titrate insulin regimen as appropriate.  Pt has epidoes of hypoglycemia and hyperglyemia  POCT BG q4h  Insulin dose adjsuted

## 2023-12-18 NOTE — PT/OT/SLP PROGRESS
"Physical Therapy      Patient Name:  Cali Mabry   MRN:  [id number]    Patient not seen today secondary to Patient unwilling to participate. PTA educated pt on benefits of OOB activities, and how moving his body is good, pt responded with "A lot of things are good for me, doesn't mean I'm going to do them" and followed with "I'm not doing shit". Pt also refused to sit up EOB and bed activities. Pt never looked up out of his covers during this interaction. Will follow-up as able.    "

## 2023-12-18 NOTE — ASSESSMENT & PLAN NOTE
Noted in history. No concern for withdrawal at this time.  [facility] protocol
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
## 2023-12-18 NOTE — NURSING
Patient refused morning assessment and afternoon blood glucose check. Dr. Graham notified, no new orders at this time.    1200- Patient refused bath.

## 2023-12-18 NOTE — NURSING
[facility] [place]  Nurses Note -- 4 Eyes          Skin assessed on: Q Shift      [x] No Pressure Injuries Present    []Prevention Measures Documented    [] Yes LDA  for Pressure Injury Previously documented     [] Yes New Pressure Injury Discovered   [] LDA for New Pressure Injury Added      Attending RN:  Elisa Schilling RN     Second RN:  Kwesi EASON RN

## 2023-12-18 NOTE — NURSING
Suction setup, siderails padded, Telesitter remains on, call light within reach, bed alarm on. Pt calm at this time.

## 2023-12-18 NOTE — ASSESSMENT & PLAN NOTE
-Patient noted to be medically stable for discharge at this time  -Has been able to work with OT/PT who recommend placement for further rehabilitation  -Patient pending placement, continue in-hospital care as stated above in the meantime  -CM/SW following, appreciate input   -Will place DC orders once placement has been secured

## 2023-12-18 NOTE — PT/OT/SLP PROGRESS
Occupational Therapy   Treatment    Name: Cali Mabry  MRN: [id number]  Admitting Diagnosis:  Diabetic ketoacidosis without coma       Recommendations:     Discharge Recommendations: Moderate Intensity Therapy  Discharge Equipment Recommendations:  bedside commode, bath bench, wheelchair  Barriers to discharge:   (not at PLOF)    Assessment:     Cali Mabry is a 58 y.o. male with a medical diagnosis of Diabetic ketoacidosis without coma. Performance deficits affecting function are weakness, impaired cognition, impaired endurance, decreased ROM, decreased coordination, decreased upper extremity function, impaired self care skills, impaired fine motor, visual deficits, impaired functional mobility, decreased lower extremity function, impaired skin, decreased safety awareness, gait instability, impaired balance.     Rehab Prognosis:  Fair; patient would benefit from acute skilled OT services to address these deficits and reach maximum level of function.       Plan:     Patient to be seen  (3-5x/week) to address the above listed problems via self-care/home management, therapeutic activities, therapeutic exercises  Plan of Care Expires: 12/26/23  Plan of Care Reviewed with: patient    Subjective     Chief Complaint: didn't want to get up   Patient/Family Comments/goals: initially agreed to sit in the chair and do exercises to get stronger; however, once OT started to assist pt, he refused with poor reasoning/insight     Pain/Comfort:  Pain Rating 1: 0/10    Objective:     Communicated with: nurseNalini, prior to session. Patient found HOB elevated with peripheral IV, Condom Catheter, telemetry, bed alarm upon OT entry to room.    General Precautions: Standard, diabetic, fall, vision impaired    Orthopedic Precautions:N/A  Braces: N/A  Respiratory Status: Room air     Occupational Performance:     Bed Mobility:    Initially pt agreed for OT to assist pt to sit EOB, but upon assist, pt then refused.      Activities of Daily Living:  Grooming: maximal assistance to comb his hair with max encouragement       [facility] 6 Click ADL: 12    Treatment & Education:  Pt re-educated on OT role/POC  Importance of OOB activity with therapy assistance  LETICIA SHIELDS x10 shoulder flex/ext, elbow flex/ext, shoulder horizontal ab/dduction; pt declined AROM despite encouragement.   Multiple self-care tasks/functional mobility attempted- assistance level noted above   All questions/concerns answered within OT scope of practice       Patient left with bed in chair position with all lines intact, call button in reach, bed alarm on, nurse, Nalini, notified, and all needs met/within reach.     GOALS:   Multidisciplinary Problems       Occupational Therapy Goals          Problem: Occupational Therapy    Goal Priority Disciplines Outcome Interventions   Occupational Therapy Goal     OT, PT/OT Ongoing, Progressing    Description: Goals to be met by: 12/25/23     Patient will increase functional independence with ADLs by performing:    Feeding with Modified [place].  UE Dressing with Modified [place].  LE Dressing with Stand-by Assistance.  Grooming while seated with Supervision.  Supine to sit with Supervision.  Upper extremity exercise program x10 reps per handout, with supervision.  Toilet transfer to bedside commode via stand pivot with Minimal Assistance    Toileting from bedside commode for hygiene and clothing management with Minimal Assistance  Stand pivot transfer with Minimal Assistance  W/c manual propulsion ~50 ft to increase functional mobility within household for ADL participation      Step transfer- to be determined pending further pt participation                          Time Tracking:     OT Date of Treatment: 12/18/23  OT Start Time: 1514  OT Stop Time: 1523  OT Total Time (min): 9 min    Billable Minutes:Therapeutic Exercise 9 min     OT/LEONCIO: OT     Number of LEONCIO visits since last OT visit: 0    12/18/2023

## 2023-12-18 NOTE — PROGRESS NOTES
[facility] Medicine  Progress Note    Patient Name: Cali Mabry  MRN: [id number]  Patient Class: IP- Inpatient   Admission Date: 12/10/2023  Length of Stay: 8 days  Attending Physician: Chapito Graham III, MD  Primary Care Provider: Lawanda Flores NP        Subjective:     Principal Problem:Diabetic ketoacidosis without coma        HPI:    Cali Mabry is a 58 y.o. male who has a past medical history of Acute on chronic pancreatitis, Addiction to drug, CHF, DM type 1, HCV, HTN (hypertension), psychiatric care, IVDU (intravenous drug user), Pancreatitis, Psychiatric problem, Stroke, and Substance abuse, presented to the ED with CC of Generalized Fatigue.     Patient is unable to give much of a history. He says he takes his insulin regularly, but unable to give me even a guess as to how much he takes. Nurse states he told her he has not taken any insulin for the last couple days. He was BIB [facility] after he slid out of his chair and unable to get up. Given patient being a poor historian and no family at bedside, will put information gathered by ED:     Per chart review, patient was seen at [facility] on 11/05 for generalized weakness. Per chart review, patient was seen at [facility] [place] on 11/07 for generalized weakness. The patient left AMA on both ED visits. Patient notes left hand trauma s/p falling out a chair. Denies head trauma and LOC. He endorses associated nausea, vomiting, diarrhea, general abdominal pain, fatigue, and visual disturbance. Patient notes compliance with insulin lispro and insulin detemir U-100. No medication PTA. No other exacerbating or alleviating factors. Patient denies fever, chills, headache, or other associated symptoms.     Patient currently denies any CP, AP or SOB.     Overview/Hospital Course:  Mr. Mabry is a pleasant 58 old gentleman with a past history TBI secondary to bipolar disorder, CHF, diabetes, hepatitis-C, hypertension, history of polysubstance  abuse including IVDU was admitted to the hospital for DKA.  DKA resolved insulin drip protocol.  Patient is seen by psychiatry services and recommended to follow up with outpatient psychiatric clinic, no need for pec or inpatient psychiatric admission at this time.   to assist with setting up outpatient follow up appointment with Psychiatry.    Interval History: NAEON. Pending placement still    Review of Systems  Objective:     Vital Signs (Most Recent):  Temp: 98.3 °F (36.8 °C) (12/18/23 0743)  Pulse: 81 (12/18/23 0743)  Resp: 18 (12/18/23 0743)  BP: (!) 147/75 (12/18/23 0743)  SpO2: 96 % (12/18/23 0743) Vital Signs (24h Range):  Temp:  [97.7 °F (36.5 °C)-98.4 °F (36.9 °C)] 98.3 °F (36.8 °C)  Pulse:  [69-92] 81  Resp:  [16-18] 18  SpO2:  [95 %-96 %] 96 %  BP: (117-198)/(64-91) 147/75     Weight: 60.9 kg (134 lb 4.2 oz)  Body mass index is 19.26 kg/m².    Intake/Output Summary (Last 24 hours) at 12/18/2023 0811  Last data filed at 12/18/2023 0159  Gross per 24 hour   Intake 837 ml   Output 1100 ml   Net -263 ml         Physical Exam      Vitals and nursing note reviewed.   Constitutional:       Appearance: chronically ill appearing  HENT:      Head: Normocephalic and atraumatic.   Eyes:      Extraocular Movements: Extraocular movements intact.      Pupils: Pupils are equal, round, and reactive to light.   Cardiovascular:      Rate and Rhythm: Normal rate and regular rhythm.   Pulmonary:      Effort: Pulmonary effort is normal. No respiratory distress.   Abdominal:      General: Abdomen is flat. There is no distension.   Musculoskeletal:         General: Normal range of motion.      Cervical back: Normal range of motion.   Neurological:      General: No focal deficit present.      Mental Status: He is alert and oriented to person, place, and time.   Psychiatric:         Mood and Affect: Mood normal.         Behavior: Behavior normal.   Significant Labs: All pertinent labs within the past 24 hours have  been reviewed.    Significant Imaging: I have reviewed all pertinent imaging results/findings within the past 24 hours.    Assessment/Plan:      * Diabetic ketoacidosis without coma  Patient's FSGs are uncontrolled due to hyperglycemia on current medication regimen.  Last A1c reviewed:  Lab Results   Component Value Date    HGBA1C 11.3 (H) 12/10/2023     Antihyperglycemics (From admission, onward)      Start     Stop Route Frequency Ordered    12/18/23 0900  insulin detemir U-100 (Levemir) pen 10 Units  (Order Panel)         -- SubQ 2 times daily 12/17/23 2226    12/17/23 2048  insulin aspart U-100 pen 0-5 Units         -- SubQ Before meals & nightly PRN 12/17/23 1948    12/17/23 1645  insulin aspart U-100 pen 6 Units  (Order Panel)         -- SubQ 3 times daily with meals 12/17/23 1241          Hold Oral hypoglycemics while patient is in the hospital.  Patient reports that he does not know how much insulin he takes at home  Insulin infusion initiated upon admission; no longer requiring insulin gtt  Continue to monitor glucose trend and titrate insulin regimen as appropriate.  Pt has epidoes of hypoglycemia and hyperglyemia  POCT BG q4h  Insulin dose adjsuted     Delirium  -Noted episodes of intermittent agitation and drowsiness.  -Continue correction of metabolic derangements  -scheduled melatonin q.h.s. to regulate sleep-wake cycle  -will monitor on CIWA protocol for today, unlikely to be acute alcohol withdrawals as patient has now been admitted here for more than 7 days.  -possibly related to chronic psychiatric issues  -Maintain Delirium precautions: Maintain regular sleep cycle. Early ambulation. Minimal interruptions overnight. Re-orient patient frequently. Maintain adequate boregimen, hydration and electrolyte replenishments. Hearing Aids and eye glasses as needed.      Discharge planning issues  -Patient noted to be medically stable for discharge at this time  -Has been able to work with OT/PT who recommend  placement for further rehabilitation  -Patient pending placement, continue in-hospital care as stated above in the meantime  -CM/SW following, appreciate input   -Will place DC orders once placement has been secured        Acute cystitis without hematuria  -Urine culture from 12/10/2023 grew pan-sensitive Proteus. Doing well with ceftriaxone; will transition to oral course.    Bipolar disorder  Noted in history. Continue home medications: Risperidone, Venlafaxine, and Carbamazepine       Amphetamine use disorder, severe, dependence  Noted in history. No concern for withdrawal at this time.  [facility] protocol    Type 1 diabetes  See DKA note for plan  DKA resolved  Continue subq insulin     CAD (coronary artery disease)  Patient with known CAD. No chest pain concerns. Continue ASA, Statin, Plavix, BB.    COPD (chronic obstructive pulmonary disease)  Patient's COPD is controlled currently. Respiratory status stable.  Continue inhalers  prn  and monitor respiratory status closely.     Opioid abuse  Noted in history. No concern for withdrawal thus far. Continue to monitor.    Chronic hepatitis C  Noted history. Outpatient follow-up to discuss treatment options if desired.        VTE Risk Mitigation (From admission, onward)           Ordered     Place DAWIT hose  Until discontinued         12/10/23 1727     Place sequential compression device  Until discontinued         12/10/23 1727     IP VTE LOW RISK PATIENT  Once         12/10/23 1727                    Discharge Planning   ROLA:      Code Status: Full Code   Is the patient medically ready for discharge?:     Reason for patient still in hospital (select all that apply): Pending disposition  Discharge Plan A: Skilled Nursing Facility   Discharge Delays: (!) Post-Acute Set-up              Chapito Graham III, MD  Department of Hospital Medicine   [facility]

## 2023-12-18 NOTE — SUBJECTIVE & OBJECTIVE
Interval History: NAEON. Pending placement still    Review of Systems  Objective:     Vital Signs (Most Recent):  Temp: 98.3 °F (36.8 °C) (12/18/23 0743)  Pulse: 81 (12/18/23 0743)  Resp: 18 (12/18/23 0743)  BP: (!) 147/75 (12/18/23 0743)  SpO2: 96 % (12/18/23 0743) Vital Signs (24h Range):  Temp:  [97.7 °F (36.5 °C)-98.4 °F (36.9 °C)] 98.3 °F (36.8 °C)  Pulse:  [69-92] 81  Resp:  [16-18] 18  SpO2:  [95 %-96 %] 96 %  BP: (117-198)/(64-91) 147/75     Weight: 60.9 kg (134 lb 4.2 oz)  Body mass index is 19.26 kg/m².    Intake/Output Summary (Last 24 hours) at 12/18/2023 0811  Last data filed at 12/18/2023 0159  Gross per 24 hour   Intake 837 ml   Output 1100 ml   Net -263 ml         Physical Exam      Vitals and nursing note reviewed.   Constitutional:       Appearance: chronically ill appearing  HENT:      Head: Normocephalic and atraumatic.   Eyes:      Extraocular Movements: Extraocular movements intact.      Pupils: Pupils are equal, round, and reactive to light.   Cardiovascular:      Rate and Rhythm: Normal rate and regular rhythm.   Pulmonary:      Effort: Pulmonary effort is normal. No respiratory distress.   Abdominal:      General: Abdomen is flat. There is no distension.   Musculoskeletal:         General: Normal range of motion.      Cervical back: Normal range of motion.   Neurological:      General: No focal deficit present.      Mental Status: He is alert and oriented to person, place, and time.   Psychiatric:         Mood and Affect: Mood normal.         Behavior: Behavior normal.   Significant Labs: All pertinent labs within the past 24 hours have been reviewed.    Significant Imaging: I have reviewed all pertinent imaging results/findings within the past 24 hours.

## 2023-12-18 NOTE — PLAN OF CARE
Problem: Adult Inpatient Plan of Care  Goal: Plan of Care Review  Outcome: Ongoing, Progressing  Goal: Patient-Specific Goal (Individualized)  Outcome: Ongoing, Progressing  Goal: Absence of Hospital-Acquired Illness or Injury  Outcome: Ongoing, Progressing  Goal: Optimal Comfort and Wellbeing  Outcome: Ongoing, Progressing  Goal: Readiness for Transition of Care  Outcome: Ongoing, Progressing     Problem: Diabetic Ketoacidosis  Goal: Fluid and Electrolyte Balance with Absence of Ketosis  Outcome: Ongoing, Progressing     Problem: Infection  Goal: Absence of Infection Signs and Symptoms  Outcome: Ongoing, Progressing     Problem: Skin Injury Risk Increased  Goal: Skin Health and Integrity  Outcome: Ongoing, Progressing     Problem: Fall Injury Risk  Goal: Absence of Fall and Fall-Related Injury  Outcome: Ongoing, Progressing

## 2023-12-19 PROBLEM — R53.81 DEBILITY: Status: ACTIVE | Noted: 2023-12-19

## 2023-12-19 LAB
POCT GLUCOSE: 112 MG/DL (ref 70–110)
POCT GLUCOSE: 150 MG/DL (ref 70–110)
POCT GLUCOSE: 155 MG/DL (ref 70–110)
POCT GLUCOSE: 179 MG/DL (ref 70–110)
POCT GLUCOSE: 278 MG/DL (ref 70–110)
POCT GLUCOSE: 293 MG/DL (ref 70–110)
POCT GLUCOSE: 49 MG/DL (ref 70–110)

## 2023-12-19 PROCEDURE — 94761 N-INVAS EAR/PLS OXIMETRY MLT: CPT

## 2023-12-19 PROCEDURE — 97110 THERAPEUTIC EXERCISES: CPT | Mod: CQ

## 2023-12-19 PROCEDURE — 63600175 PHARM REV CODE 636 W HCPCS: Performed by: HOSPITALIST

## 2023-12-19 PROCEDURE — 25000003 PHARM REV CODE 250: Performed by: FAMILY MEDICINE

## 2023-12-19 PROCEDURE — 25000003 PHARM REV CODE 250: Performed by: HOSPITALIST

## 2023-12-19 PROCEDURE — A4216 STERILE WATER/SALINE, 10 ML: HCPCS | Performed by: HOSPITALIST

## 2023-12-19 PROCEDURE — 25000003 PHARM REV CODE 250: Performed by: PEDIATRICS

## 2023-12-19 PROCEDURE — 11000001 HC ACUTE MED/SURG PRIVATE ROOM

## 2023-12-19 PROCEDURE — 97530 THERAPEUTIC ACTIVITIES: CPT | Mod: CQ

## 2023-12-19 PROCEDURE — 25000003 PHARM REV CODE 250: Performed by: INTERNAL MEDICINE

## 2023-12-19 PROCEDURE — 92610 EVALUATE SWALLOWING FUNCTION: CPT

## 2023-12-19 RX ADMIN — THIAMINE HCL TAB 100 MG 100 MG: 100 TAB at 08:12

## 2023-12-19 RX ADMIN — FOLIC ACID 1 MG: 1 TABLET ORAL at 08:12

## 2023-12-19 RX ADMIN — Medication 6 MG: at 08:12

## 2023-12-19 RX ADMIN — Medication 10 ML: at 04:12

## 2023-12-19 RX ADMIN — THERA TABS 1 TABLET: TAB at 08:12

## 2023-12-19 RX ADMIN — CARBAMAZEPINE 400 MG: 100 TABLET, EXTENDED RELEASE ORAL at 08:12

## 2023-12-19 RX ADMIN — INSULIN ASPART 6 UNITS: 100 INJECTION, SOLUTION INTRAVENOUS; SUBCUTANEOUS at 11:12

## 2023-12-19 RX ADMIN — INSULIN ASPART 3 UNITS: 100 INJECTION, SOLUTION INTRAVENOUS; SUBCUTANEOUS at 04:12

## 2023-12-19 RX ADMIN — LORAZEPAM 1 MG: 2 INJECTION INTRAMUSCULAR; INTRAVENOUS at 12:12

## 2023-12-19 RX ADMIN — INSULIN ASPART 6 UNITS: 100 INJECTION, SOLUTION INTRAVENOUS; SUBCUTANEOUS at 04:12

## 2023-12-19 RX ADMIN — METOPROLOL SUCCINATE ER TABLETS 25 MG: 25 TABLET, FILM COATED, EXTENDED RELEASE ORAL at 08:12

## 2023-12-19 RX ADMIN — VENLAFAXINE HYDROCHLORIDE 75 MG: 75 CAPSULE, EXTENDED RELEASE ORAL at 08:12

## 2023-12-19 RX ADMIN — CARBAMAZEPINE 200 MG: 100 TABLET, EXTENDED RELEASE ORAL at 08:12

## 2023-12-19 RX ADMIN — Medication 10 ML: at 06:12

## 2023-12-19 RX ADMIN — ATORVASTATIN CALCIUM 20 MG: 10 TABLET, FILM COATED ORAL at 08:12

## 2023-12-19 RX ADMIN — ASPIRIN 81 MG CHEWABLE TABLET 81 MG: 81 TABLET CHEWABLE at 08:12

## 2023-12-19 RX ADMIN — CIPROFLOXACIN 500 MG: 500 TABLET, FILM COATED ORAL at 08:12

## 2023-12-19 RX ADMIN — POLYETHYLENE GLYCOL 3350 17 G: 17 POWDER, FOR SOLUTION ORAL at 08:12

## 2023-12-19 RX ADMIN — LOSARTAN POTASSIUM 25 MG: 25 TABLET, FILM COATED ORAL at 08:12

## 2023-12-19 RX ADMIN — INSULIN DETEMIR 10 UNITS: 100 INJECTION, SOLUTION SUBCUTANEOUS at 08:12

## 2023-12-19 RX ADMIN — Medication 10 ML: at 11:12

## 2023-12-19 RX ADMIN — INSULIN ASPART 6 UNITS: 100 INJECTION, SOLUTION INTRAVENOUS; SUBCUTANEOUS at 08:12

## 2023-12-19 RX ADMIN — CLOPIDOGREL BISULFATE 75 MG: 75 TABLET ORAL at 08:12

## 2023-12-19 RX ADMIN — RISPERIDONE 1 MG: 1 TABLET ORAL at 08:12

## 2023-12-19 RX ADMIN — INSULIN ASPART 3 UNITS: 100 INJECTION, SOLUTION INTRAVENOUS; SUBCUTANEOUS at 11:12

## 2023-12-19 NOTE — ASSESSMENT & PLAN NOTE
-Noted episodes of intermittent agitation and drowsiness.  -Continue correction of metabolic derangements  -scheduled melatonin q.h.s. to regulate sleep-wake cycle  -will monitor on CIWA protocol. unlikely to be acute alcohol withdrawals as patient has now been admitted here for more than 7 days.  -possibly related to chronic psychiatric issues  -Maintain Delirium precautions: Maintain regular sleep cycle. Early ambulation. Minimal interruptions overnight. Re-orient patient frequently. Maintain adequate boregimen, hydration and electrolyte replenishments. Hearing Aids and eye glasses as needed.  -Resolved

## 2023-12-19 NOTE — PLAN OF CARE
Problem: Diabetic Ketoacidosis  Goal: Fluid and Electrolyte Balance with Absence of Ketosis  Outcome: Ongoing, Progressing     Problem: Infection  Goal: Absence of Infection Signs and Symptoms  Outcome: Ongoing, Progressing     Problem: Skin Injury Risk Increased  Goal: Skin Health and Integrity  Outcome: Ongoing, Progressing     Problem: Impaired Wound Healing  Goal: Optimal Wound Healing  Outcome: Ongoing, Progressing     Problem: Fall Injury Risk  Goal: Absence of Fall and Fall-Related Injury  Outcome: Ongoing, Progressing     Problem: Thought Process Alteration  Goal: Optimal Thought Clarity  Outcome: Ongoing, Progressing

## 2023-12-19 NOTE — PT/OT/SLP EVAL
Speech Language Pathology Evaluation  Bedside Swallow    Patient Name:  Cali Mabry   MRN:  [id number]  Admitting Diagnosis: Diabetic ketoacidosis without coma    Recommendations:                 General Recommendations:  Dysphagia therapy/ 2x/week   Diet recommendations:  Puree, Thin   Aspiration Precautions: Alternating bites/sips, Feed only when awake/alert, Frequent oral care, HOB to 90 degrees, Meds crushed in puree, and Standard aspiration precautions   General Precautions: Standard, fall  Communication strategies:  provide increased time to answer    Assessment:     Cali Mabry is a 58 y.o. male with an admitting dx of Diabetic ketoacidosis without coma. He presents with dysphagia negatively impacted by mild confusion.     History:     Past Medical History:   Diagnosis Date    Acute on chronic pancreatitis 04/03/2016    Addiction to drug     CHF (congestive heart failure)     DKA (diabetic ketoacidoses) 02/2020    DM (diabetes mellitus), type 1     HCV (hepatitis C virus)     high VL     HTN (hypertension)     Hx of psychiatric care     Hypomagnesemia 05/25/2020    IVDU (intravenous drug user)     Heroin    Pancreatitis     Psychiatric problem     Stroke     Substance abuse        Past Surgical History:   Procedure Laterality Date    CARDIAC SURGERY  1999    stent placed. ([facility])    ESOPHAGOGASTRODUODENOSCOPY N/A 3/5/2020    Gastritis.  No H pylori.  Completed PPI for 1 month.  Procedure: EGD (ESOPHAGOGASTRODUODENOSCOPY);  Surgeon: Brinda Rene MD;  Location: [facility] ENDO;  Service: Endoscopy;  Laterality: N/A;  W421 A; [phone]    LEFT HEART CATHETERIZATION Left 9/28/2020    Procedure: Left heart cath;  Surgeon: Fito Rangel MD;  Location: [facility] CATH LAB;  Service: Cardiology;  Laterality: Left;    SHOULDER SURGERY  2013    right shoulder, spur removal.       Chest X-Rays: CXR on 12/11/2023    COMPARISON:  Chest radiograph December 10, 2023     FINDINGS:  Single chest view is  submitted.  There is interval placement of a right-sided PICC line, distal tip at the expected location of the cavoatrial junction.     When accounting for position and technique and depth of inspiration, the appearance of the cardiomediastinal silhouette and pulmonary bronchovascular markings is stable.  Mild atelectatic change noted.The osseous structures appear intact.       Prior diet: W/o textural modification per pt. Several crunchy snacks were present on b/s table that appeared to have been brought in from home.       Subjective     Pt alert w/susutained MELO for participation b/s swallow evaluation; he was oriented to self. Pt demo mild confusion in response to ST questions.     Patient goals: None stated      Pain/Comfort:  Pain Rating 1: 0/10    Respiratory Status: Room air    Objective:     Oral Musculature Evaluation  Oral Musculature: WFL  Dentition: edentulous  Secretion Management: adequate  Mucosal Quality: dry  Mandibular Strength and Mobility: WFL  Oral Labial Strength and Mobility: WFL  Lingual Strength and Mobility: WFL  Buccal Strength and Mobility: WFL  Volitional Cough: Weak  Volitional Swallow: Delayed  Voice Prior to PO Intake: Clear    Bedside Swallow Eval:   Consistencies Assessed:  Thin liquids - x3oz via straw sips  Puree - x3 tsp   Soft solids - attempted x2 with pt holding items, mastication not visualized    Regular solid - item present on b/s table from pt's home; ST assisted with removal of item which he appeared to chew upon initial presentation, oral hold of bolus noted  with continued observation.     Oral Phase:   Excess chewing of soft solid trial x1, and reg solid x1 removed from pt's mouth with ST assist.   Orally expelled  Slow oral transit time    Pharyngeal Phase:   delayed swallow initation    Compensatory Strategies  None    Treatment: B/s swallow eval completed. ST recs diet downgrade from regular to puree d/t impaired mastication across solid trials.     Goals:    Multidisciplinary Problems       SLP Goals          Problem: SLP    Goal Priority Disciplines Outcome   SLP Goal     SLP Ongoing, Progressing   Description: 1. Pt will tolerate IDDSI level 4 puree diet and thin liquids w/o overt s/s of aspiration.                        Plan:     Patient to be seen:  2 x/week   Plan of Care expires:     Plan of Care reviewed with:  patient   SLP Follow-Up:  Yes       Discharge recommendations:      Barriers to Discharge:  Level of Skilled Assistance Needed      Time Tracking:     SLP Treatment Date:   12/19/23  Speech Start Time:  1115  Speech Stop Time:  1126     Speech Total Time (min):  11 min    Billable Minutes: Eval Swallow and Oral Function 11mnin    12/19/2023

## 2023-12-19 NOTE — ASSESSMENT & PLAN NOTE
-Patient noted to be medically stable for discharge at this time  -Has been able to work with OT/PT who recommend SNF placement for further rehabilitation  -Patient pending placement, continue in-hospital care as stated above in the meantime  -CM/SW following, appreciate input   -Will place DC orders once placement has been secured

## 2023-12-19 NOTE — PLAN OF CARE
No current accepting SNF at this time. Additional referrals sent via care port for new group home NH placement. Plan B to discharge home with home health and follow with ACT team. Spoke with Tamia with ACT team,([phone])  stated will need dc summary and MAR faxed upon discharge to resume services( fax:[phone]).   12/19/23 1611   Post-Acute Status   Post-Acute Authorization Placement   Post-Acute Placement Status Pending medical clearance/testing   Discharge Delays None known at this time   Discharge Plan   Discharge Plan A Skilled Nursing Facility   Discharge Plan B New Nursing Home placement - group home care facility

## 2023-12-19 NOTE — ASSESSMENT & PLAN NOTE
Patient with Acute on chronic debility due to severe malnutrition.  Evaluation for etiology is complete. Plan includes progressive mobility protocol initated, PT/OT consulted, and fall precautions in place.    -PT/OT consulted: recommends SNF

## 2023-12-19 NOTE — PROGRESS NOTES
[facility] Medicine  Progress Note    Patient Name: Cali Mabry  MRN: [id number]  Patient Class: IP- Inpatient   Admission Date: 12/10/2023  Length of Stay: 9 days  Attending Physician: Uday Cramer DO  Primary Care Provider: Lawanda Flores NP        Subjective:     Principal Problem:Diabetic ketoacidosis without coma        HPI:    Cali Mabry is a 58 y.o. male who has a past medical history of Acute on chronic pancreatitis, Addiction to drug, CHF, DM type 1, HCV, HTN (hypertension), psychiatric care, IVDU (intravenous drug user), Pancreatitis, Psychiatric problem, Stroke, and Substance abuse, presented to the ED with CC of Generalized Fatigue.     Patient is unable to give much of a history. He says he takes his insulin regularly, but unable to give me even a guess as to how much he takes. Nurse states he told her he has not taken any insulin for the last couple days. He was BIB [facility] after he slid out of his chair and unable to get up. Given patient being a poor historian and no family at bedside, will put information gathered by ED:     Per chart review, patient was seen at [facility] on 11/05 for generalized weakness. Per chart review, patient was seen at [facility] on 11/07 for generalized weakness. The patient left AMA on both ED visits. Patient notes left hand trauma s/p falling out a chair. Denies head trauma and LOC. He endorses associated nausea, vomiting, diarrhea, general abdominal pain, fatigue, and visual disturbance. Patient notes compliance with insulin lispro and insulin detemir U-100. No medication PTA. No other exacerbating or alleviating factors. Patient denies fever, chills, headache, or other associated symptoms.     Patient currently denies any CP, AP or SOB.     Overview/Hospital Course:  Mr. Mabry is a 58 old gentleman with a past history TBI, bipolar disorder, CHF, diabetes, hepatitis-C, hypertension, history of polysubstance abuse including IVDU was  admitted to the hospital for DKA on 12/10/2023.  DKA resolved with insulin drip protocol.  Transition to subq insulin. Glucose labile. Monitoring closely. Patient was seen by psychiatry services and recommended to follow up with outpatient psychiatric clinic, no need for pec or inpatient psychiatric admission at this time.  Urine cx with Proteus mirabilis.  Denies any dysuria, but does complain suprapubic discomfort.  CT abdomen w/bilateral perinephric fat stranding, new since the previous exam.  The urinary bladder is mildly distended noting mild residual wall thickening.  Completed 5 days of Rocephin on 12/16 and was transitioned to p.o. ciprofloxacin to complete treatment course for complicated UTI/early pyelonephritis.  PT/OT consulted and recommend SNF. Awaiting placement.       Interval History:  No acute overnight events.  Patient remained afebrile.  Mother at bedside.  Mother states patient has difficulty swallowing.  Nurse reported that patient tolerated diet this morning.  However, when mother was feeding patient yesterday, she did not allow him to swallow. SLP consulted    Review of Systems   Constitutional:  Positive for activity change. Negative for fatigue.   Respiratory:  Negative for cough and shortness of breath.    Cardiovascular:  Negative for chest pain.   Gastrointestinal:  Negative for abdominal pain, nausea and vomiting.        Suprapubic pain improving   Genitourinary:  Negative for difficulty urinating, dysuria and hematuria.   Neurological:  Positive for weakness.   Psychiatric/Behavioral:  Negative for agitation.      Objective:     Vital Signs (Most Recent):  Temp: 97.7 °F (36.5 °C) (12/19/23 1120)  Pulse: 79 (12/19/23 1120)  Resp: (!) 21 (12/19/23 1120)  BP: (!) 149/81 (12/19/23 1120)  SpO2: 97 % (12/19/23 1120) Vital Signs (24h Range):  Temp:  [97.6 °F (36.4 °C)-98.4 °F (36.9 °C)] 97.7 °F (36.5 °C)  Pulse:  [71-87] 79  Resp:  [18-21] 21  SpO2:  [94 %-98 %] 97 %  BP: ()/(53-84)  149/81     Weight: 60.9 kg (134 lb 4.2 oz)  Body mass index is 19.26 kg/m².    Intake/Output Summary (Last 24 hours) at 12/19/2023 1331  Last data filed at 12/19/2023 1220  Gross per 24 hour   Intake 730 ml   Output 300 ml   Net 430 ml         Physical Exam  Vitals and nursing note reviewed.   Constitutional:       General: He is not in acute distress.     Appearance: He is ill-appearing (chronically ill appearing).   HENT:      Mouth/Throat:      Mouth: Mucous membranes are moist.   Eyes:      Extraocular Movements: Extraocular movements intact.   Cardiovascular:      Rate and Rhythm: Normal rate and regular rhythm.   Pulmonary:      Effort: Pulmonary effort is normal.      Breath sounds: Normal breath sounds.   Abdominal:      General: There is no distension.      Palpations: Abdomen is soft.      Tenderness: There is no abdominal tenderness.   Musculoskeletal:         General: No swelling or tenderness.   Skin:     General: Skin is warm and dry.   Neurological:      Mental Status: He is alert and oriented to person, place, and time.      Motor: Weakness (diffuse weakness) present.   Psychiatric:      Comments: Calm on exam.              Significant Labs: All pertinent labs within the past 24 hours have been reviewed.    Significant Imaging: I have reviewed all pertinent imaging results/findings within the past 24 hours.    Assessment/Plan:      * Diabetic ketoacidosis without coma  Patient's FSGs are uncontrolled due to hyperglycemia on current medication regimen.  Last A1c reviewed:  Lab Results   Component Value Date    HGBA1C 11.3 (H) 12/10/2023     Antihyperglycemics (From admission, onward)      Start     Stop Route Frequency Ordered    12/18/23 0900  insulin detemir U-100 (Levemir) pen 10 Units  (Order Panel)         -- SubQ 2 times daily 12/17/23 2226    12/17/23 2048  insulin aspart U-100 pen 0-5 Units         -- SubQ Before meals & nightly PRN 12/17/23 1948    12/17/23 1645  insulin aspart U-100 pen 6  Units  (Order Panel)         -- SubQ 3 times daily with meals 12/17/23 1241          Hold Oral hypoglycemics while patient is in the hospital.  Patient reports that he does not know how much insulin he takes at home  Insulin infusion initiated upon admission; no longer requiring insulin gtt  Continue to monitor glucose trend and titrate insulin regimen as appropriate.  Pt has epidoes of hypoglycemia and hyperglyemia. Glucose labile  POCT BG q4h  Insulin dose adjusted  Meds: basal bolus insulin + SSI PRN to maintain goal 140-180  ADA diet, accuchecks ACHS, hypoglycemic protocol    Acute cystitis without hematuria  -Urine culture from 12/10/2023 grew pan-sensitive Proteus.   -Completed 5 days of Rocephin on 12/16 and was transitioned to p.o. ciprofloxacin to complete treatment course for complicated UTI/early pyelonephritis.    Type 1 diabetes  See DKA note for plan  DKA resolved  Continue subq insulin     Delirium  -Noted episodes of intermittent agitation and drowsiness.  -Continue correction of metabolic derangements  -scheduled melatonin q.h.s. to regulate sleep-wake cycle  -will monitor on CIWA protocol. unlikely to be acute alcohol withdrawals as patient has now been admitted here for more than 7 days.  -possibly related to chronic psychiatric issues  -Maintain Delirium precautions: Maintain regular sleep cycle. Early ambulation. Minimal interruptions overnight. Re-orient patient frequently. Maintain adequate boregimen, hydration and electrolyte replenishments. Hearing Aids and eye glasses as needed.  -Resolved      Amphetamine use disorder, severe, dependence  Noted in history. No concern for withdrawal at this time.  UDS negative on admission   CIWA protocol    Opioid abuse  Noted in history. No concern for withdrawal thus far. Continue to monitor.    Chronic hepatitis C  Noted history. Outpatient follow-up to discuss treatment options if desired.      CAD (coronary artery disease)  Patient with known CAD. No  chest pain concerns.   Continue ASA, Statin, Plavix, BB.    COPD (chronic obstructive pulmonary disease)  Patient's COPD is controlled currently. Respiratory status stable.  Continue inhalers  prn  and monitor respiratory status closely.     Bipolar disorder  Noted in history.   Continue home medications: Risperidone, Venlafaxine, and Carbamazepine       Discharge planning issues  -Patient noted to be medically stable for discharge at this time  -Has been able to work with OT/PT who recommend SNF placement for further rehabilitation  -Patient pending placement, continue in-hospital care as stated above in the meantime  -CM/SW following, appreciate input   -Will place DC orders once placement has been secured        Debility  Patient with Acute on chronic debility due to severe malnutrition.  Evaluation for etiology is complete. Plan includes progressive mobility protocol initated, PT/OT consulted, and fall precautions in place.    -PT/OT consulted: recommends SNF      VTE Risk Mitigation (From admission, onward)           Ordered     Place DAWIT hose  Until discontinued         12/10/23 1727     Place sequential compression device  Until discontinued         12/10/23 1727     IP VTE LOW RISK PATIENT  Once         12/10/23 1727                    Discharge Planning   ROLA:      Code Status: Full Code   Is the patient medically ready for discharge?:     Reason for patient still in hospital (select all that apply): Patient trending condition, Treatment, PT / OT recommendations, and Pending disposition  Discharge Plan A: Skilled Nursing Facility   Discharge Delays: (!) Post-Acute Set-up              Uday Cramer DO  Department of Hospital Medicine   [facility] - Med Surg

## 2023-12-19 NOTE — PT/OT/SLP PROGRESS
"Physical Therapy Treatment    Patient Name:  Cali Mabry   MRN:  [id number]    Recommendations:     Discharge Recommendations: Moderate Intensity Therapy  Discharge Equipment Recommendations: bedside commode, bath bench, wheelchair  Barriers to discharge: (not at PLOF; high risk of falls)      Assessment:     Cali Mabry is a 58 y.o. male admitted with a medical diagnosis of Diabetic ketoacidosis without coma.  He presents with the following impairments/functional limitations: weakness, impaired endurance, impaired self care skills, impaired balance, gait instability, decreased safety awareness, impaired functional mobility, decreased lower extremity function, impaired skin, decreased upper extremity function, decreased coordination, decreased ROM.    Pt more pleasant this date. Pt does get distracted but was able to redirect easily. Pt was able to perform x5 STS from EOB requiring min A using RW and took ~2-3 steps fwd/bkwd abruptly sitting back on EOB following steps.    Rehab Prognosis: Fair; patient would benefit from acute skilled PT services to address these deficits and reach maximum level of function.    Recent Surgery: * No surgery found *      Plan:     During this hospitalization, patient to be seen 3 x/week to address the identified rehab impairments via gait training, therapeutic exercises, therapeutic activities and progress toward the following goals:    Plan of Care Expires:  12/18/23    Subjective     Chief Complaint: "I would like to finish my cookie"  Patient/Family Comments/goals: Pt agreed initially to sit up in chair, however refused"  Pain/Comfort:  Pain Rating 1: 0/10      Objective:     Communicated with Nalini prior to session.  Patient found with bed in chair position with bed alarm, peripheral IV, telemetry upon PT entry to room.     General Precautions: Standard, aspiration, fall, diabetic, vision impaired  Orthopedic Precautions: N/A  Braces: N/A  Respiratory Status: Room " air     Functional Mobility:  Bed Mobility:     Rolling Right: minimum assistance  Scooting: contact guard assistance to scoot anteriorly towards EOB and laterally towards HOB  Supine to Sit: moderate assistance  Sit to Supine: maximal assistance  Transfers: Gait belt donned     Sit to Stand:  minimum assistance with rolling walker  Gait: Pt took ~2-3 steps fwd/bkwd using RW and Max A, pt with L lateral lean and posterior lean. Pt required Max verbal/tactile cueing for RW management and safety awareness, pt grabbing for bed side rails once standing. Pt required verbal/tactile cues to advance LE's due to poor initiation. Pt with decreased step length, decreased xiomara, and poor postural control.  Balance: fair- sitting and fair dynamic standing       AM-PAC 6 CLICK MOBILITY  Turning over in bed (including adjusting bedclothes, sheets and blankets)?: 3  Sitting down on and standing up from a chair with arms (e.g., wheelchair, bedside commode, etc.): 3  Moving from lying on back to sitting on the side of the bed?: 2  Moving to and from a bed to a chair (including a wheelchair)?: 2  Need to walk in hospital room?: 2  Climbing 3-5 steps with a railing?: 1  Basic Mobility Total Score: 13       Treatment & Education:  Pt performed supine AP, SLR 1x10 BLE, PROM performed on LLE. Pt performed seated EOB hip flexion and LAQ, pt c/o hip pain during hip flexion.    Pt performed x5 trials STS from EOB using RW and Min A. Pt stood ~10 seconds each trial requiring Max A to maintain correct posture and Max verbal cueing for hand placement.    Patient left HOB elevated with all lines intact, call button in reach, bed alarm on, nursing notified, and mother present.    GOALS:   Multidisciplinary Problems       Physical Therapy Goals          Problem: Physical Therapy    Goal Priority Disciplines Outcome Goal Variances Interventions   Physical Therapy Goal     PT, PT/OT Ongoing, Progressing     Description: Goals to be met by:  23     Patient will increase functional independence with mobility by performin. Pt to be min A with bed mobility.  2. Pt to transfer with min A.  3. Pt to ambulate 50' w/RW CGA.  4. Pt to propel wheelchair 50' w/SBA.  5. Pt to be able to tolerate 1x10 reps BLE exs with Vcs.                         Time Tracking:     PT Received On: 23  PT Start Time: 938     PT Stop Time: 1005  PT Total Time (min): 27 min     Billable Minutes: Therapeutic Activity 15 and Therapeutic Exercise 12    Treatment Type: Treatment  PT/PTA: PTA     Number of PTA visits since last PT visit: 2     2023

## 2023-12-19 NOTE — PLAN OF CARE
Problem: SLP  Goal: SLP Goal  Description: 1. Pt will tolerate IDDSI level 4 puree diet and thin liquids w/o overt s/s of aspiration.   Outcome: Ongoing, Progressing       ST will follow for ongoing dysphagia tx.

## 2023-12-19 NOTE — PLAN OF CARE
Problem: Physical Therapy  Goal: Physical Therapy Goal  Description: Goals to be met by: 23     Patient will increase functional independence with mobility by performin. Pt to be min A with bed mobility.  2. Pt to transfer with min A.  3. Pt to ambulate 50' w/RW CGA.  4. Pt to propel wheelchair 50' w/SBA.  5. Pt to be able to tolerate 1x10 reps BLE exs with Vcs.    Outcome: Ongoing, Progressing       Pt more pleasant this date. Pt does get distracted but was able to redirect easily. Pt was able to perform x5 STS from EOB requiring min A using RW and took ~2-3 steps fwd/bkwd abruptly sitting back on EOB following steps.

## 2023-12-19 NOTE — ASSESSMENT & PLAN NOTE
Noted in history. No concern for withdrawal at this time.  UDS negative on admission   [facility] protocol

## 2023-12-19 NOTE — ASSESSMENT & PLAN NOTE
-Urine culture from 12/10/2023 grew pan-sensitive Proteus.   -Completed 5 days of Rocephin on 12/16 and was transitioned to p.o. ciprofloxacin to complete treatment course for complicated UTI/early pyelonephritis.

## 2023-12-19 NOTE — PLAN OF CARE
Problem: Diabetic Ketoacidosis  Goal: Fluid and Electrolyte Balance with Absence of Ketosis  Outcome: Ongoing, Progressing     Problem: Diabetes Comorbidity  Goal: Blood Glucose Level Within Targeted Range  Outcome: Ongoing, Progressing     Problem: Infection  Goal: Absence of Infection Signs and Symptoms  Outcome: Ongoing, Progressing     Problem: Fall Injury Risk  Goal: Absence of Fall and Fall-Related Injury  Outcome: Ongoing, Progressing

## 2023-12-19 NOTE — SUBJECTIVE & OBJECTIVE
Interval History:  No acute overnight events.  Patient remained afebrile.  Mother at bedside.  Mother states patient has difficulty swallowing.  Nurse reported that patient tolerated diet this morning.  However, when mother was feeding patient yesterday, she did not allow him to swallow. SLP consulted    Review of Systems   Constitutional:  Positive for activity change. Negative for fatigue.   Respiratory:  Negative for cough and shortness of breath.    Cardiovascular:  Negative for chest pain.   Gastrointestinal:  Negative for abdominal pain, nausea and vomiting.        Suprapubic pain improving   Genitourinary:  Negative for difficulty urinating, dysuria and hematuria.   Neurological:  Positive for weakness.   Psychiatric/Behavioral:  Negative for agitation.      Objective:     Vital Signs (Most Recent):  Temp: 97.7 °F (36.5 °C) (12/19/23 1120)  Pulse: 79 (12/19/23 1120)  Resp: (!) 21 (12/19/23 1120)  BP: (!) 149/81 (12/19/23 1120)  SpO2: 97 % (12/19/23 1120) Vital Signs (24h Range):  Temp:  [97.6 °F (36.4 °C)-98.4 °F (36.9 °C)] 97.7 °F (36.5 °C)  Pulse:  [71-87] 79  Resp:  [18-21] 21  SpO2:  [94 %-98 %] 97 %  BP: ()/(53-84) 149/81     Weight: 60.9 kg (134 lb 4.2 oz)  Body mass index is 19.26 kg/m².    Intake/Output Summary (Last 24 hours) at 12/19/2023 1331  Last data filed at 12/19/2023 1220  Gross per 24 hour   Intake 730 ml   Output 300 ml   Net 430 ml         Physical Exam  Vitals and nursing note reviewed.   Constitutional:       General: He is not in acute distress.     Appearance: He is ill-appearing (chronically ill appearing).   HENT:      Mouth/Throat:      Mouth: Mucous membranes are moist.   Eyes:      Extraocular Movements: Extraocular movements intact.   Cardiovascular:      Rate and Rhythm: Normal rate and regular rhythm.   Pulmonary:      Effort: Pulmonary effort is normal.      Breath sounds: Normal breath sounds.   Abdominal:      General: There is no distension.      Palpations: Abdomen  is soft.      Tenderness: There is no abdominal tenderness.   Musculoskeletal:         General: No swelling or tenderness.   Skin:     General: Skin is warm and dry.   Neurological:      Mental Status: He is alert and oriented to person, place, and time.      Motor: Weakness (diffuse weakness) present.   Psychiatric:      Comments: Calm on exam.              Significant Labs: All pertinent labs within the past 24 hours have been reviewed.    Significant Imaging: I have reviewed all pertinent imaging results/findings within the past 24 hours.

## 2023-12-20 LAB
POCT GLUCOSE: 134 MG/DL (ref 70–110)
POCT GLUCOSE: 233 MG/DL (ref 70–110)
POCT GLUCOSE: 260 MG/DL (ref 70–110)
POCT GLUCOSE: 327 MG/DL (ref 70–110)

## 2023-12-20 PROCEDURE — 92526 ORAL FUNCTION THERAPY: CPT

## 2023-12-20 PROCEDURE — 97110 THERAPEUTIC EXERCISES: CPT | Mod: CQ

## 2023-12-20 PROCEDURE — 25000003 PHARM REV CODE 250: Performed by: HOSPITALIST

## 2023-12-20 PROCEDURE — 97542 WHEELCHAIR MNGMENT TRAINING: CPT | Mod: CQ

## 2023-12-20 PROCEDURE — 97535 SELF CARE MNGMENT TRAINING: CPT

## 2023-12-20 PROCEDURE — 97530 THERAPEUTIC ACTIVITIES: CPT

## 2023-12-20 PROCEDURE — 11000001 HC ACUTE MED/SURG PRIVATE ROOM

## 2023-12-20 PROCEDURE — A4216 STERILE WATER/SALINE, 10 ML: HCPCS | Performed by: HOSPITALIST

## 2023-12-20 PROCEDURE — 25000003 PHARM REV CODE 250: Performed by: PEDIATRICS

## 2023-12-20 PROCEDURE — 63600175 PHARM REV CODE 636 W HCPCS: Performed by: HOSPITALIST

## 2023-12-20 PROCEDURE — 25000003 PHARM REV CODE 250: Performed by: FAMILY MEDICINE

## 2023-12-20 RX ADMIN — INSULIN ASPART 6 UNITS: 100 INJECTION, SOLUTION INTRAVENOUS; SUBCUTANEOUS at 08:12

## 2023-12-20 RX ADMIN — Medication 10 ML: at 05:12

## 2023-12-20 RX ADMIN — CIPROFLOXACIN 500 MG: 500 TABLET, FILM COATED ORAL at 09:12

## 2023-12-20 RX ADMIN — RISPERIDONE 1 MG: 1 TABLET ORAL at 08:12

## 2023-12-20 RX ADMIN — VENLAFAXINE HYDROCHLORIDE 75 MG: 75 CAPSULE, EXTENDED RELEASE ORAL at 08:12

## 2023-12-20 RX ADMIN — METOPROLOL SUCCINATE ER TABLETS 25 MG: 25 TABLET, FILM COATED, EXTENDED RELEASE ORAL at 08:12

## 2023-12-20 RX ADMIN — Medication 10 ML: at 04:12

## 2023-12-20 RX ADMIN — ASPIRIN 81 MG CHEWABLE TABLET 81 MG: 81 TABLET CHEWABLE at 08:12

## 2023-12-20 RX ADMIN — CIPROFLOXACIN 500 MG: 500 TABLET, FILM COATED ORAL at 08:12

## 2023-12-20 RX ADMIN — INSULIN ASPART 6 UNITS: 100 INJECTION, SOLUTION INTRAVENOUS; SUBCUTANEOUS at 12:12

## 2023-12-20 RX ADMIN — INSULIN ASPART 6 UNITS: 100 INJECTION, SOLUTION INTRAVENOUS; SUBCUTANEOUS at 04:12

## 2023-12-20 RX ADMIN — ATORVASTATIN CALCIUM 20 MG: 10 TABLET, FILM COATED ORAL at 08:12

## 2023-12-20 RX ADMIN — FOLIC ACID 1 MG: 1 TABLET ORAL at 08:12

## 2023-12-20 RX ADMIN — THERA TABS 1 TABLET: TAB at 08:12

## 2023-12-20 RX ADMIN — INSULIN ASPART 2 UNITS: 100 INJECTION, SOLUTION INTRAVENOUS; SUBCUTANEOUS at 04:12

## 2023-12-20 RX ADMIN — INSULIN DETEMIR 10 UNITS: 100 INJECTION, SOLUTION SUBCUTANEOUS at 08:12

## 2023-12-20 RX ADMIN — LORAZEPAM 1 MG: 2 INJECTION INTRAMUSCULAR; INTRAVENOUS at 09:12

## 2023-12-20 RX ADMIN — INSULIN DETEMIR 10 UNITS: 100 INJECTION, SOLUTION SUBCUTANEOUS at 09:12

## 2023-12-20 RX ADMIN — INSULIN ASPART 3 UNITS: 100 INJECTION, SOLUTION INTRAVENOUS; SUBCUTANEOUS at 12:12

## 2023-12-20 RX ADMIN — CARBAMAZEPINE 400 MG: 100 TABLET, EXTENDED RELEASE ORAL at 09:12

## 2023-12-20 RX ADMIN — LOSARTAN POTASSIUM 25 MG: 25 TABLET, FILM COATED ORAL at 08:12

## 2023-12-20 RX ADMIN — CARBAMAZEPINE 200 MG: 100 TABLET, EXTENDED RELEASE ORAL at 08:12

## 2023-12-20 RX ADMIN — Medication 6 MG: at 09:12

## 2023-12-20 RX ADMIN — Medication 10 ML: at 11:12

## 2023-12-20 RX ADMIN — CLOPIDOGREL BISULFATE 75 MG: 75 TABLET ORAL at 08:12

## 2023-12-20 RX ADMIN — INSULIN ASPART 2 UNITS: 100 INJECTION, SOLUTION INTRAVENOUS; SUBCUTANEOUS at 09:12

## 2023-12-20 RX ADMIN — RISPERIDONE 1 MG: 1 TABLET ORAL at 09:12

## 2023-12-20 RX ADMIN — Medication 10 ML: at 12:12

## 2023-12-20 RX ADMIN — THIAMINE HCL TAB 100 MG 100 MG: 100 TAB at 08:12

## 2023-12-20 NOTE — PLAN OF CARE
Problem: Physical Therapy  Goal: Physical Therapy Goal  Description: Goals to be met by: 23     Patient will increase functional independence with mobility by performin. Pt to be min A with bed mobility.  2. Pt to transfer with min A.  3. Pt to ambulate 50' w/RW CGA.  4. Pt to propel wheelchair 50' w/SBA.  5. Pt to be able to tolerate 1x10 reps BLE exs with Vcs.    Outcome: Ongoing, Progressing

## 2023-12-20 NOTE — ASSESSMENT & PLAN NOTE
-Urine culture from 12/10/2023 grew pan-sensitive Proteus.   -Completed 5 days of Rocephin on 12/16 and was transitioned to p.o. ciprofloxacin to complete treatment course for complicated UTI/early pyelonephritis. DAWIT on 12/21

## 2023-12-20 NOTE — PLAN OF CARE
Nursing Home Placement     Nursing Reviewing:  [place]     Care Needs Exceeds:  [facility]     No Avail Beds:  [facility]     Out of [facility]:  [facility]  [place]    Insufficient Funding:  [facility]     Patient not Eligible:  [facility]

## 2023-12-20 NOTE — PT/OT/SLP PROGRESS
Physical Therapy Treatment    Patient Name:  Cali Mabry   MRN:  [id number]    Recommendations:     Discharge Recommendations: Moderate Intensity Therapy  Discharge Equipment Recommendations: bedside commode, bath bench, wheelchair  Barriers to discharge:  (not at PLOF; high risk of falls)    Assessment:     Cali Mabry is a 58 y.o. male admitted with a medical diagnosis of Diabetic ketoacidosis without coma.  He presents with the following impairments/functional limitations: weakness, impaired endurance, impaired self care skills, impaired functional mobility, gait instability, impaired balance, impaired cognition, decreased coordination, decreased upper extremity function, decreased lower extremity function, decreased safety awareness, visual deficits.    Rehab Prognosis: Fair; patient would benefit from acute skilled PT services to address these deficits and reach maximum level of function.    Recent Surgery: * No surgery found *      Plan:     During this hospitalization, patient to be seen 3 x/week to address the identified rehab impairments via gait training, therapeutic activities, therapeutic exercises, wheelchair management/training and progress toward the following goals:    Plan of Care Expires:  12/22/23    Subjective     Chief Complaint: n/a  Patient/Family Comments/goals: Pt agreed to participate.  Pain/Comfort:  Pain Rating 1: 0/10  Pain Rating Post-Intervention 1: 0/10      Objective:     Communicated with nurse April prior to session.  Patient found sitting edge of bed with OT present, Condom Catheter, telemetry, PICC line ([employer]) upon PT entry to room.     General Precautions: Standard, fall, pureed diet  Orthopedic Precautions: N/A  Braces: N/A  Respiratory Status: Room air     Functional Mobility:  Bed Mobility:     Bridging: dependence and of 2 persons  Sit to Supine: total assistance and of 2 persons  Transfers:   gait belt donned prior transfer  Bed <> Wheelchair: maximal  assistance and of 2 persons with hand-held assist using Squat Pivot  Balance: Poor+ Sitting  Wheelchair Propulsion:  Pt propelled Standard wheelchair x ~20 feet on Level tile with  Right upper extremity and Left upper extremity with Maximum Assistance; Max v/t cues to push with BUE (pt started with RUE with v/t cuing, no initiation noted with LUE until being directed to use LUE as well, v/t cues required for every wheelchair pushing fwd), , sequencing, and attention to task (pt would stop if therapist stop cuing).       AM-PAC 6 CLICK MOBILITY  Turning over in bed (including adjusting bedclothes, sheets and blankets)?: 3  Sitting down on and standing up from a chair with arms (e.g., wheelchair, bedside commode, etc.): 3  Moving from lying on back to sitting on the side of the bed?: 2  Moving to and from a bed to a chair (including a wheelchair)?: 2  Need to walk in hospital room?: 2  Climbing 3-5 steps with a railing?: 1  Basic Mobility Total Score: 13       Treatment & Education:  BLE ex in supine 15 reps x 2 sets AROM AP (with Max cuing), PROM SAQ, Hip/Knee Flex/Ext, Hip Add/Abd (no initiation noted followed with Max cuing to perform BLE ex, required PROM to complete.    Patient left HOB elevated with BUE/BLE offloaded by pillows, tray table at bedside, all lines intact, call button in reach, bed alarm on, nurse notified, and Pharmacist present.    GOALS:   Multidisciplinary Problems       Physical Therapy Goals          Problem: Physical Therapy    Goal Priority Disciplines Outcome Goal Variances Interventions   Physical Therapy Goal     PT, PT/OT Ongoing, Progressing     Description: Goals to be met by: 23     Patient will increase functional independence with mobility by performin. Pt to be min A with bed mobility.  2. Pt to transfer with min A.  3. Pt to ambulate 50' w/RW CGA.  4. Pt to propel wheelchair 50' w/SBA.  5. Pt to be able to tolerate 1x10 reps BLE exs with Vcs.                          Time Tracking:     PT Received On: 12/20/23  PT Start Time: 1141     PT Stop Time: 1210  PT Total Time (min): 29 min     Billable Minutes: Therapeutic Exercise 14 min and Train/Wheelchair Management 15 min  (partial co-treated with OT)    Co- treatment performed due to patient's multiple deficits requiring two skilled therapists to appropriately and safely assess patient's strength and endurance while facilitating functional tasks in addition to accommodating for patient's activity tolerance      Treatment Type: Treatment  PT/PTA: PTA     Number of PTA visits since last PT visit: 3     12/20/2023

## 2023-12-20 NOTE — ASSESSMENT & PLAN NOTE
Patient's FSGs are uncontrolled due to hyperglycemia on current medication regimen.  Last A1c reviewed:  Lab Results   Component Value Date    HGBA1C 11.3 (H) 12/10/2023     Antihyperglycemics (From admission, onward)    Start     Stop Route Frequency Ordered    12/18/23 0900  insulin detemir U-100 (Levemir) pen 10 Units  (Order Panel)         -- SubQ 2 times daily 12/17/23 2226    12/17/23 2048  insulin aspart U-100 pen 0-5 Units         -- SubQ Before meals & nightly PRN 12/17/23 1948    12/17/23 1645  insulin aspart U-100 pen 6 Units  (Order Panel)         -- SubQ 3 times daily with meals 12/17/23 1241        Hold Oral hypoglycemics while patient is in the hospital.  Patient reports that he does not know how much insulin he takes at home  Insulin infusion initiated upon admission; no longer requiring insulin gtt  Continue to monitor glucose trend and titrate insulin regimen as appropriate.  Pt has epidoes of hypoglycemia and hyperglyemia. Glucose labile  POCT BG q4h  Insulin dose adjusted  Meds: basal bolus insulin + SSI PRN to maintain goal 140-180  ADA diet, accuchecks ACHS, hypoglycemic protocol

## 2023-12-20 NOTE — ASSESSMENT & PLAN NOTE
Noted in history. No concern for withdrawal at this time.  UDS negative on admission   [facility] protocol

## 2023-12-20 NOTE — PT/OT/SLP PROGRESS
Occupational Therapy   Treatment    Name: Cali Mabry  MRN: [id number]  Admitting Diagnosis:  Diabetic ketoacidosis without coma       Recommendations:     Discharge Recommendations: Moderate Intensity Therapy  Discharge Equipment Recommendations:  bedside commode, bath bench, wheelchair  Barriers to discharge:   (not at PLOF)    Assessment:     Cali Mabry is a 58 y.o. male with a medical diagnosis of Diabetic ketoacidosis without coma. Performance deficits affecting function are weakness, gait instability, decreased upper extremity function, decreased ROM, impaired endurance, impaired balance, decreased lower extremity function, decreased safety awareness, impaired self care skills, impaired cognition, impaired functional mobility, decreased coordination, visual deficits.     Rehab Prognosis:  Fair; patient would benefit from acute skilled OT services to address these deficits and reach maximum level of function.       Plan:     Patient to be seen  (3-5x/week) to address the above listed problems via self-care/home management, therapeutic activities, therapeutic exercises  Plan of Care Expires: 12/26/23  Plan of Care Reviewed with: patient    Subjective     Chief Complaint: got frustrated with OT attempting to teach visual scanning sitting EOB   Patient/Family Comments/goals: apologized, got calm again and wanted to get to the w/c   Pain/Comfort:  Pain Rating 1: 0/10    Objective:     Patient found HOB elevated with bed alarm, peripheral IV, Condom Catheter, telemetry (avInbilins monitor) upon OT entry to room.    General Precautions: Standard, fall, pureed diet    Orthopedic Precautions:N/A  Braces: N/A  Respiratory Status: Room air     Occupational Performance:     Bed Mobility:    Patient completed Scooting anteriorly with total assistance  Patient completed Supine to Sit with maximal assistance, with side rail, and HOB elevated  Patient completed Sit to Supine with total assistance     Functional  Mobility/Transfers:  Patient completed Bed <> Wheelchair Transfer using Squat Pivot technique with total assistance with no assistive device  Functional Mobility: Gait belt donned prior to transfer for safety during mobility/transfers. Pt completed ~ 20 ft of manual w/c propulsion using BUE with MAX assistance. Pt with no initiation to use LUE, but did allow hand-over-hand LUE movement to engage UE. With OT stopping assistance, pt did not continue use of LUE. Pt with poor visual attention to the L side. Pt with less initiation of use of RUE today, requiring frequent cueing and assistance. Pt required total A to lock/unlock w/c brakes and required B/L LE foot plates. Pt required cueing and CGA d/t pt attempted anterior pelvic tilt in the w/c to avoid sliding out of the w/c.     Activities of Daily Living:  Feeding:  pt required varying assistance while sitting EOB to complete safe feeding trials with SLP from MIN-MAX A d/t posterior and L lateral lean. Pt required total A for safe feeding with RUE supported on side rail to assist with balance.     Grooming: maximal assistance to clean hands   Upper Body Dressing: maximal assistance to doff dirty gown and don clean gown seated EOB with MOD A for sit balance   Lower Body Dressing: total assistance to adjust socks       AMPA 6 Click ADL: 12    Treatment & Education:  Pt re-educated on OT role/POC.   Importance of OOB activity with therapy assistance.  Safety during functional t/f and mobility; OT de-scaled cueing as pt became frustrated with cueing and attempts for increased pt engagement with improving sit balance. Pt calmly accepted MIN to MAX A for sit balance to complete ADLs.   Multiple self-care tasks/functional mobility completed- assistance level noted above   All questions/concerns answered within OT scope of practice       Patient left  HOB elevated completing exercises with Wiley, PTA,  with all lines intact and call button in reach    GOALS:    Multidisciplinary Problems       Occupational Therapy Goals          Problem: Occupational Therapy    Goal Priority Disciplines Outcome Interventions   Occupational Therapy Goal     OT, PT/OT Ongoing, Progressing    Description: Goals to be met by: 12/25/23     Patient will increase functional independence with ADLs by performing:    Feeding with Modified [place].  UE Dressing with Modified [place].  LE Dressing with Stand-by Assistance.  Grooming while seated with Supervision.  Supine to sit with Supervision.  Upper extremity exercise program x10 reps per handout, with supervision.  Toilet transfer to bedside commode via stand pivot with Minimal Assistance    Toileting from bedside commode for hygiene and clothing management with Minimal Assistance  Stand pivot transfer with Minimal Assistance  W/c manual propulsion ~50 ft to increase functional mobility within household for ADL participation      Step transfer- to be determined pending further pt participation                          Time Tracking:     OT Date of Treatment: 12/20/23  OT Start Time: 1119  OT Stop Time: 1157  OT Total Time (min): 38 min    Billable Minutes:Self Care/Home Management 13 min  Therapeutic Activity 25 min  Total Time 38 min (partial co-treat with PTA)     OT/LEONCIO: OT     Number of LEONCIO visits since last OT visit: 0    12/20/2023

## 2023-12-20 NOTE — SUBJECTIVE & OBJECTIVE
Interval History:  No acute overnight events.  Patient remained afebrile.  Mother at bedside.  Pt pleasant this morning     Review of Systems   Constitutional:  Positive for activity change. Negative for fatigue.   Respiratory:  Negative for cough and shortness of breath.    Cardiovascular:  Negative for chest pain.   Gastrointestinal:  Negative for abdominal pain, nausea and vomiting.        Suprapubic pain resolved   Genitourinary:  Negative for difficulty urinating, dysuria and hematuria.   Neurological:  Positive for weakness.   Psychiatric/Behavioral:  Negative for agitation.      Objective:     Vital Signs (Most Recent):  Temp: 97.7 °F (36.5 °C) (12/20/23 1107)  Pulse: 78 (12/20/23 1107)  Resp: 20 (12/20/23 1107)  BP: (!) 152/76 (12/20/23 1107)  SpO2: 96 % (12/20/23 1107) Vital Signs (24h Range):  Temp:  [97 °F (36.1 °C)-98.6 °F (37 °C)] 97.7 °F (36.5 °C)  Pulse:  [71-93] 78  Resp:  [17-20] 20  SpO2:  [96 %-98 %] 96 %  BP: (141-165)/(73-91) 152/76     Weight: 60.9 kg (134 lb 4.2 oz)  Body mass index is 19.26 kg/m².    Intake/Output Summary (Last 24 hours) at 12/20/2023 1522  Last data filed at 12/20/2023 1213  Gross per 24 hour   Intake 840 ml   Output 1725 ml   Net -885 ml           Physical Exam  Vitals and nursing note reviewed.   Constitutional:       General: He is not in acute distress.     Appearance: He is ill-appearing (chronically ill appearing).   HENT:      Mouth/Throat:      Mouth: Mucous membranes are moist.   Eyes:      Extraocular Movements: Extraocular movements intact.   Cardiovascular:      Rate and Rhythm: Normal rate and regular rhythm.   Pulmonary:      Effort: Pulmonary effort is normal.      Breath sounds: Normal breath sounds.   Abdominal:      General: There is no distension.      Palpations: Abdomen is soft.      Tenderness: There is no abdominal tenderness.   Musculoskeletal:         General: No swelling or tenderness.   Skin:     General: Skin is warm and dry.   Neurological:       Mental Status: He is alert and oriented to person, place, and time.      Motor: Weakness (diffuse weakness) present.   Psychiatric:      Comments: Calm on exam.              Significant Labs: All pertinent labs within the past 24 hours have been reviewed.    Significant Imaging: I have reviewed all pertinent imaging results/findings within the past 24 hours.

## 2023-12-20 NOTE — NURSING
[facility]  Nurses Note -- 4 Eyes      12/20/2023       Skin assessed on: Q Shift      [x] No Pressure Injuries Present    []Prevention Measures Documented    [] Yes LDA  for Pressure Injury Previously documented     [] Yes New Pressure Injury Discovered   [] LDA for New Pressure Injury Added      Attending RN:  Flower Pennington RN     Second RN:  FREDERIC Martinez

## 2023-12-20 NOTE — PLAN OF CARE
Problem: SLP  Goal: SLP Goal  Description: 1. Pt will tolerate IDDSI level 4 puree diet and thin liquids w/o overt s/s of aspiration.   Outcome: Ongoing, Progressing     Pt requires feeding assist, and should only be fed when awake/alert. ST will continue to follow.

## 2023-12-20 NOTE — PT/OT/SLP PROGRESS
Speech Language Pathology Treatment    Patient Name:  Cali Mabry   MRN:  [id number]  Admitting Diagnosis: Diabetic ketoacidosis without coma    Recommendations:                 General Recommendations:  Dysphagia therapy  Diet recommendations:  Puree, Liquid Diet Level: Thin   Aspiration Precautions: Alternating bites/sips, Assistance with meals (Patient requires supervision during meals), Check for pocketing/oral residue, Feed only when awake/alert, Frequent oral care, HOB to 90 degrees, Meds crushed in puree, and Remain upright 30 minutes post meal   General Precautions: Standard, fall  Communication strategies:  provide increased time to answer    Assessment:     Cali Mabry is a 58 y.o. male with an admitting dx of Diabetic ketoacidosis without coma. He presents with dysphagia negatively impacted by ongoing confusion.      Subjective     Pt seated upright in at EOB with OT present in room.     Patient goals: None stated this date.      Pain/Comfort:   None    Respiratory Status: Room air    Objective:     Has the patient been evaluated by SLP for swallowing?   Yes  Keep patient NPO? No   Current Respiratory Status:  RA     At onset of session, pt demo oral hold of uneaten food items from breakfast tray. ST assist provided to clear oral debris including buccal and sulci stasis. Once cleared, pt was presented with PO trials to assess his swallowing function with current LRD. With adequate MELO pt cleared puree trials w/o pocketing or residue s/p trials. No overt s/s of aspiration observed during session.     Goals:   Multidisciplinary Problems       SLP Goals          Problem: SLP    Goal Priority Disciplines Outcome   SLP Goal     SLP Ongoing, Progressing   Description: 1. Pt will tolerate IDDSI level 4 puree diet and thin liquids w/o overt s/s of aspiration.                        Plan:     Patient to be seen:  2 x/week   Plan of Care expires:     Plan of Care reviewed with:  patient   SLP Follow-Up:   Yes       Discharge recommendations:      Barriers to Discharge:  Level of Skilled Assistance Needed   and Safety Awareness      Time Tracking:     SLP Treatment Date:   12/20/23  Speech Start Time:  1130  Speech Stop Time:  1140     Speech Total Time (min):  10 min    Billable Minutes: Treatment Swallowing Dysfunction 10min    12/20/2023

## 2023-12-20 NOTE — PROGRESS NOTES
GI Lab Pre-Procedure Worksheet    Patient Name:  Mark Ribera  Medical Record Number: [id number]   YOB: 1968     Procedure to be performed: colonoscopy    Please indicate below if patient has any of the following conditions/issues:  Morbidly obese:  indicate weight:  (ASC Weight limit = 650 lbs). Height:   BMI > 40:  (see BMI sheet)  Does patient have any acute GI symptoms and/or family/personal hx of ca?    If yes:  Rectal bleeding, abdominal pain, change in bowel habits, unexplained weight loss?    Personal history of GI cancer     Age >70 or < 45     Any previous heart surgery/cardiac stent placement/pacemaker or AICD placement     History of MI/CHF/CVA     Coagulopathy or use of anticoagulants  (can be assessed through cardio as usual)  CKD with dialysis     Dementia/Alzheimer’s disease     Any chemotherapy or radiation in the previous 6 months     Use of home oxygen   IF THE PT ANSWERED YES TO ANY OF THE ABOVE QUESTIONS, AN OFFICE VISIT WILL NEED TO BE SCHEDULED.  Egg or Soy Allergy:  no  Can lie on left side: YES  Hip, knee, or shoulder replacement within last 6 months: no Pt will need to contact the surgeon if antibiotics are needed.  Implanted stimulator: No   Mastectomy with lymph node removal: no  Mediport: No  AICD (implantable defibulator): no   Cardiac stent placed within last year: No  Pacemaker: No  Wheelchair bound or other limited mobility: no  Power of  for Healthcare assigned: (Bring copy if not in Epic) no  Legal Guardian assigned: (Bring copy if not in Epic) no   needed: no  Deaf: No  Blind: no  Needed Propofol for prior procedures? no  Any allergies/problems to Demerol, versed or fentanyl? no  Have you ever had any problems with anesthesia/IV Sedation?  Nausea, vomiting, pain, motion sickness? no  Do you have dentures, partials, chipped, or loose teeth? no     [facility]  Progress Note    Patient Name: Cali Mabry  MRN: [id number]  Patient Class: IP- Inpatient   Admission Date: 12/10/2023  Length of Stay: 10 days  Attending Physician: Uday Cramer DO  Primary Care Provider: Lawanda Flores NP        Subjective:     Principal Problem:Diabetic ketoacidosis without coma        HPI:    Cali Mabry is a 58 y.o. male who has a past medical history of Acute on chronic pancreatitis, Addiction to drug, CHF, DM type 1, HCV, HTN (hypertension), psychiatric care, IVDU (intravenous drug user), Pancreatitis, Psychiatric problem, Stroke, and Substance abuse, presented to the ED with CC of Generalized Fatigue.     Patient is unable to give much of a history. He says he takes his insulin regularly, but unable to give me even a guess as to how much he takes. Nurse states he told her he has not taken any insulin for the last couple days. He was BIB [facility] after he slid out of his chair and unable to get up. Given patient being a poor historian and no family at bedside, will put information gathered by ED:     Per chart review, patient was seen at [facility] on 11/05 for generalized weakness. Per chart review, patient was seen at [facility] on 11/07 for generalized weakness. The patient left AMA on both ED visits. Patient notes left hand trauma s/p falling out a chair. Denies head trauma and LOC. He endorses associated nausea, vomiting, diarrhea, general abdominal pain, fatigue, and visual disturbance. Patient notes compliance with insulin lispro and insulin detemir U-100. No medication PTA. No other exacerbating or alleviating factors. Patient denies fever, chills, headache, or other associated symptoms.     Patient currently denies any CP, AP or SOB.     Overview/Hospital Course:  Mr. Mabry is a 58 old gentleman with a past history TBI, bipolar disorder, CHF, diabetes, hepatitis-C, hypertension, history of polysubstance abuse including IVDU was  admitted to the hospital for DKA on 12/10/2023.  DKA resolved with insulin drip protocol.  Transition to subq insulin. Glucose labile. Monitoring closely. Patient was seen by psychiatry services and recommended to follow up with outpatient psychiatric clinic, no need for pec or inpatient psychiatric admission at this time.  Urine cx with Proteus mirabilis.  Denies any dysuria, but does complain suprapubic discomfort.  CT abdomen w/bilateral perinephric fat stranding, new since the previous exam.  The urinary bladder is mildly distended noting mild residual wall thickening.  Completed 5 days of Rocephin on 12/16 and was transitioned to p.o. ciprofloxacin to complete treatment course for complicated UTI/early pyelonephritis.  PT/OT consulted and recommend SNF. Awaiting placement.     Interval History:  No acute overnight events.  Patient remained afebrile.  Mother at bedside.  Pt pleasant this morning     Review of Systems   Constitutional:  Positive for activity change. Negative for fatigue.   Respiratory:  Negative for cough and shortness of breath.    Cardiovascular:  Negative for chest pain.   Gastrointestinal:  Negative for abdominal pain, nausea and vomiting.        Suprapubic pain resolved   Genitourinary:  Negative for difficulty urinating, dysuria and hematuria.   Neurological:  Positive for weakness.   Psychiatric/Behavioral:  Negative for agitation.      Objective:     Vital Signs (Most Recent):  Temp: 97.7 °F (36.5 °C) (12/20/23 1107)  Pulse: 78 (12/20/23 1107)  Resp: 20 (12/20/23 1107)  BP: (!) 152/76 (12/20/23 1107)  SpO2: 96 % (12/20/23 1107) Vital Signs (24h Range):  Temp:  [97 °F (36.1 °C)-98.6 °F (37 °C)] 97.7 °F (36.5 °C)  Pulse:  [71-93] 78  Resp:  [17-20] 20  SpO2:  [96 %-98 %] 96 %  BP: (141-165)/(73-91) 152/76     Weight: 60.9 kg (134 lb 4.2 oz)  Body mass index is 19.26 kg/m².    Intake/Output Summary (Last 24 hours) at 12/20/2023 1522  Last data filed at 12/20/2023 1213  Gross per 24 hour    Intake 840 ml   Output 1725 ml   Net -885 ml           Physical Exam  Vitals and nursing note reviewed.   Constitutional:       General: He is not in acute distress.     Appearance: He is ill-appearing (chronically ill appearing).   HENT:      Mouth/Throat:      Mouth: Mucous membranes are moist.   Eyes:      Extraocular Movements: Extraocular movements intact.   Cardiovascular:      Rate and Rhythm: Normal rate and regular rhythm.   Pulmonary:      Effort: Pulmonary effort is normal.      Breath sounds: Normal breath sounds.   Abdominal:      General: There is no distension.      Palpations: Abdomen is soft.      Tenderness: There is no abdominal tenderness.   Musculoskeletal:         General: No swelling or tenderness.   Skin:     General: Skin is warm and dry.   Neurological:      Mental Status: He is alert and oriented to person, place, and time.      Motor: Weakness (diffuse weakness) present.   Psychiatric:      Comments: Calm on exam.              Significant Labs: All pertinent labs within the past 24 hours have been reviewed.    Significant Imaging: I have reviewed all pertinent imaging results/findings within the past 24 hours.    Assessment/Plan:      * Diabetic ketoacidosis without coma  Patient's FSGs are uncontrolled due to hyperglycemia on current medication regimen.  Last A1c reviewed:  Lab Results   Component Value Date    HGBA1C 11.3 (H) 12/10/2023     Antihyperglycemics (From admission, onward)      Start     Stop Route Frequency Ordered    12/18/23 0900  insulin detemir U-100 (Levemir) pen 10 Units  (Order Panel)         -- SubQ 2 times daily 12/17/23 2226    12/17/23 2048  insulin aspart U-100 pen 0-5 Units         -- SubQ Before meals & nightly PRN 12/17/23 1948    12/17/23 1645  insulin aspart U-100 pen 6 Units  (Order Panel)         -- SubQ 3 times daily with meals 12/17/23 1241          Hold Oral hypoglycemics while patient is in the hospital.  Patient reports that he does not know how  much insulin he takes at home  Insulin infusion initiated upon admission; no longer requiring insulin gtt  Continue to monitor glucose trend and titrate insulin regimen as appropriate.  Pt has epidoes of hypoglycemia and hyperglyemia. Glucose labile  POCT BG q4h  Insulin dose adjusted  Meds: basal bolus insulin + SSI PRN to maintain goal 140-180  ADA diet, accuchecks ACHS, hypoglycemic protocol    Acute cystitis without hematuria  -Urine culture from 12/10/2023 grew pan-sensitive Proteus.   -Completed 5 days of Rocephin on 12/16 and was transitioned to p.o. ciprofloxacin to complete treatment course for complicated UTI/early pyelonephritis. DAWIT on 12/21    Type 1 diabetes  See DKA note for plan  DKA resolved  Continue subq insulin     Delirium  -Noted episodes of intermittent agitation and drowsiness.  -Continue correction of metabolic derangements  -scheduled melatonin q.h.s. to regulate sleep-wake cycle  -will monitor on CIWA protocol. unlikely to be acute alcohol withdrawals as patient has now been admitted here for more than 7 days.  -possibly related to chronic psychiatric issues  -Maintain Delirium precautions: Maintain regular sleep cycle. Early ambulation. Minimal interruptions overnight. Re-orient patient frequently. Maintain adequate boregimen, hydration and electrolyte replenishments. Hearing Aids and eye glasses as needed.  -Resolved      Amphetamine use disorder, severe, dependence  Noted in history. No concern for withdrawal at this time.  UDS negative on admission   CIWA protocol    Opioid abuse  Noted in history. No concern for withdrawal thus far. Continue to monitor.    Chronic hepatitis C  Noted history. Outpatient follow-up to discuss treatment options if desired.      CAD (coronary artery disease)  Patient with known CAD. No chest pain concerns.   Continue ASA, Statin, Plavix, BB.    COPD (chronic obstructive pulmonary disease)  Patient's COPD is controlled currently. Respiratory status  stable.  Continue inhalers  prn  and monitor respiratory status closely.     Bipolar disorder  Noted in history.   Continue home medications: Risperidone, Venlafaxine, and Carbamazepine       Discharge planning issues  -Patient noted to be medically stable for discharge at this time  -Has been able to work with OT/PT who recommend SNF placement for further rehabilitation  -Patient pending placement, continue in-hospital care as stated above in the meantime  -CM/SW following, appreciate input   -Will place DC orders once placement has been secured        Debility  Patient with Acute on chronic debility due to severe malnutrition.  Evaluation for etiology is complete. Plan includes progressive mobility protocol initated, PT/OT consulted, and fall precautions in place.    -PT/OT consulted: recommends SNF      VTE Risk Mitigation (From admission, onward)           Ordered     Place DAWIT hose  Until discontinued         12/10/23 1727     Place sequential compression device  Until discontinued         12/10/23 1727     IP VTE LOW RISK PATIENT  Once         12/10/23 1727                    Discharge Planning   ROLA: 12/22/2023     Code Status: Full Code   Is the patient medically ready for discharge?:     Reason for patient still in hospital (select all that apply): Patient trending condition, Treatment, PT / OT recommendations, and Pending disposition  Discharge Plan A: Skilled Nursing Facility   Discharge Delays: None known at this time              Uday Cramer DO  Department of Hospital Medicine   [facility] [place] [facility]

## 2023-12-21 LAB
POCT GLUCOSE: 149 MG/DL (ref 70–110)
POCT GLUCOSE: 195 MG/DL (ref 70–110)
POCT GLUCOSE: 200 MG/DL (ref 70–110)
POCT GLUCOSE: 82 MG/DL (ref 70–110)
POCT GLUCOSE: 83 MG/DL (ref 70–110)

## 2023-12-21 PROCEDURE — 25000003 PHARM REV CODE 250: Performed by: PEDIATRICS

## 2023-12-21 PROCEDURE — 92526 ORAL FUNCTION THERAPY: CPT

## 2023-12-21 PROCEDURE — 11000001 HC ACUTE MED/SURG PRIVATE ROOM

## 2023-12-21 PROCEDURE — 25000003 PHARM REV CODE 250: Performed by: HOSPITALIST

## 2023-12-21 PROCEDURE — 97535 SELF CARE MNGMENT TRAINING: CPT

## 2023-12-21 PROCEDURE — 25000003 PHARM REV CODE 250: Performed by: STUDENT IN AN ORGANIZED HEALTH CARE EDUCATION/TRAINING PROGRAM

## 2023-12-21 PROCEDURE — 25000003 PHARM REV CODE 250: Performed by: INTERNAL MEDICINE

## 2023-12-21 PROCEDURE — A4216 STERILE WATER/SALINE, 10 ML: HCPCS | Performed by: HOSPITALIST

## 2023-12-21 RX ORDER — INSULIN ASPART 100 [IU]/ML
8 INJECTION, SOLUTION INTRAVENOUS; SUBCUTANEOUS
Status: DISCONTINUED | OUTPATIENT
Start: 2023-12-21 | End: 2023-12-24 | Stop reason: HOSPADM

## 2023-12-21 RX ORDER — LOSARTAN POTASSIUM 25 MG/1
50 TABLET ORAL DAILY
Status: DISCONTINUED | OUTPATIENT
Start: 2023-12-21 | End: 2023-12-24 | Stop reason: HOSPADM

## 2023-12-21 RX ADMIN — RISPERIDONE 1 MG: 1 TABLET ORAL at 09:12

## 2023-12-21 RX ADMIN — LOSARTAN POTASSIUM 50 MG: 25 TABLET, FILM COATED ORAL at 09:12

## 2023-12-21 RX ADMIN — METOPROLOL SUCCINATE ER TABLETS 25 MG: 25 TABLET, FILM COATED, EXTENDED RELEASE ORAL at 09:12

## 2023-12-21 RX ADMIN — CIPROFLOXACIN 500 MG: 500 TABLET, FILM COATED ORAL at 09:12

## 2023-12-21 RX ADMIN — CARBAMAZEPINE 200 MG: 100 TABLET, EXTENDED RELEASE ORAL at 09:12

## 2023-12-21 RX ADMIN — INSULIN ASPART 8 UNITS: 100 INJECTION, SOLUTION INTRAVENOUS; SUBCUTANEOUS at 12:12

## 2023-12-21 RX ADMIN — Medication 10 ML: at 10:12

## 2023-12-21 RX ADMIN — THERA TABS 1 TABLET: TAB at 09:12

## 2023-12-21 RX ADMIN — Medication 10 ML: at 11:12

## 2023-12-21 RX ADMIN — INSULIN ASPART 8 UNITS: 100 INJECTION, SOLUTION INTRAVENOUS; SUBCUTANEOUS at 09:12

## 2023-12-21 RX ADMIN — RISPERIDONE 1 MG: 1 TABLET ORAL at 08:12

## 2023-12-21 RX ADMIN — CIPROFLOXACIN 500 MG: 500 TABLET, FILM COATED ORAL at 08:12

## 2023-12-21 RX ADMIN — Medication 10 ML: at 07:12

## 2023-12-21 RX ADMIN — VENLAFAXINE HYDROCHLORIDE 75 MG: 75 CAPSULE, EXTENDED RELEASE ORAL at 09:12

## 2023-12-21 RX ADMIN — INSULIN DETEMIR 10 UNITS: 100 INJECTION, SOLUTION SUBCUTANEOUS at 09:12

## 2023-12-21 RX ADMIN — CLOPIDOGREL BISULFATE 75 MG: 75 TABLET ORAL at 09:12

## 2023-12-21 RX ADMIN — ATORVASTATIN CALCIUM 20 MG: 10 TABLET, FILM COATED ORAL at 09:12

## 2023-12-21 RX ADMIN — Medication 10 ML: at 05:12

## 2023-12-21 RX ADMIN — CARBAMAZEPINE 400 MG: 100 TABLET, EXTENDED RELEASE ORAL at 08:12

## 2023-12-21 RX ADMIN — INSULIN ASPART 8 UNITS: 100 INJECTION, SOLUTION INTRAVENOUS; SUBCUTANEOUS at 04:12

## 2023-12-21 RX ADMIN — FOLIC ACID 1 MG: 1 TABLET ORAL at 09:12

## 2023-12-21 RX ADMIN — THIAMINE HCL TAB 100 MG 100 MG: 100 TAB at 09:12

## 2023-12-21 RX ADMIN — ASPIRIN 81 MG CHEWABLE TABLET 81 MG: 81 TABLET CHEWABLE at 09:12

## 2023-12-21 RX ADMIN — Medication 6 MG: at 08:12

## 2023-12-21 NOTE — PT/OT/SLP PROGRESS
Speech Language Pathology Treatment    Patient Name:  Cali Mabry   MRN:  [id number]  Admitting Diagnosis: Diabetic ketoacidosis without coma    Recommendations:                 General Recommendations:  Dysphagia therapy  Diet recommendations:  Minced & Moist Diet - IDDSI Level 5, Liquid Diet Level: Thin liquids - IDDSI Level 0   Aspiration Precautions: 1 bite/sip at a time, Alternating bites/sips, Check for pocketing/oral residue, and Small bites/sips   General Precautions: Standard, fall, other (see comments) (MECHANICAL SOFT)  Communication strategies:  provide increased time to answer    Assessment:     Cali Mabry is a 58 y.o. male with a dx of Diabetic ketoacidosis without coma. He presents with dysphagia negatively impacted by ongoing confusion. ST recommended diet advancement to mechanical soft. ST will continue dysphagia tx per POC.    Subjective   Pt awake and alert upon SLP arrival. Pt agreeable to accept therapeutic PO trials for tx session. Pt stated he would like some warm tea. He also reported that he knows he has to be aware of not pocketing his food.    Pain/Comfort:  Pain Rating 1: 0/10    Respiratory Status: Room air    Objective:     Has the patient been evaluated by SLP for swallowing?   Yes  Keep patient NPO? No   Current Respiratory Status:        Pt's HOB elevated prior to administering PO trials. Pt accepted multiple trials of thin liquids via straw without difficulty or overt s/s of aspiration. Pt with slow mastication of soft solids, however, no pocketing or oral stasis of soft solids noted post swallow with subsequent liquid wash. No s/s of aspiration observed across trials.   SLP provided ongoing education of aspiration precautions to pt. Pt will require reinforcement of education 2/2 to pt confusion.    Goals:   Multidisciplinary Problems       SLP Goals          Problem: SLP    Goal Priority Disciplines Outcome   SLP Goal     SLP Ongoing, Progressing   Description: 1. Pt  will tolerate IDDSI level 4 puree diet and thin liquids w/o overt s/s of aspiration. - discontinue goal 12/21/23    2. Pt will tolerate PO diet of mechanical soft consistency with thin liquids without overt s/s of aspiration.                           Plan:     Patient to be seen:  2 x/week   Plan of Care expires:     Plan of Care reviewed with:  patient   SLP Follow-Up:  Yes       Discharge recommendations:      Barriers to Discharge:  None    Time Tracking:     SLP Treatment Date:   12/21/23  Speech Start Time:  1214  Speech Stop Time:  1237     Speech Total Time (min):  23 min    Billable Minutes: Treatment Swallowing Dysfunction 15 min and Self Care/Home Management Training 8 min    12/21/2023

## 2023-12-21 NOTE — PLAN OF CARE
Problem: Diabetes Comorbidity  Goal: Blood Glucose Level Within Targeted Range  Outcome: Ongoing, Progressing  Intervention: Monitor and Manage Glycemia  Flowsheets (Taken 12/21/2023 1038)  Glycemic Management:   blood glucose monitored   oral hydration promoted   supplemental insulin given

## 2023-12-21 NOTE — ASSESSMENT & PLAN NOTE
Patient's FSGs are uncontrolled due to hyperglycemia on current medication regimen.  Last A1c reviewed:  Lab Results   Component Value Date    HGBA1C 11.3 (H) 12/10/2023     Antihyperglycemics (From admission, onward)      Start     Stop Route Frequency Ordered    12/21/23 0715  insulin aspart U-100 pen 8 Units  (Order Panel)         -- SubQ 3 times daily with meals 12/21/23 0645    12/18/23 0900  insulin detemir U-100 (Levemir) pen 10 Units  (Order Panel)         -- SubQ 2 times daily 12/17/23 2226 12/17/23 2048  insulin aspart U-100 pen 0-5 Units         -- SubQ Before meals & nightly PRN 12/17/23 1948          Hold Oral hypoglycemics while patient is in the hospital.  Patient reports that he does not know how much insulin he takes at home  Insulin infusion initiated upon admission; no longer requiring insulin gtt  Continue to monitor glucose trend and titrate insulin regimen as appropriate.  Pt has epidoes of hypoglycemia and hyperglyemia. Glucose labile  POCT BG q4h  Insulin dose adjusted  Meds: basal bolus insulin + SSI PRN to maintain goal 140-180. Titrate insulin as needed  ADA diet, accuchecks ACHS, hypoglycemic protocol

## 2023-12-21 NOTE — PLAN OF CARE
SW explained the IMM to patient with mother at the bedside.  Patient and mother asked SW to followup with Amarilis (patient's sister) tomorrow to review the form.  SW noted request on the IMM and filed in patient;s chart.        Patient's mother stated that she cannot care for patient at home and he needs nursing home placement.  Patient's mother provided  with a list of providers a relative gave to her to share with case management.  Suggestions noted:  [facility], [facility]  and [facility].  Copy of list placed in patient's chart.

## 2023-12-21 NOTE — ASSESSMENT & PLAN NOTE
-Urine culture from 12/10/2023 grew pan-sensitive Proteus.   -Completed 5 days of Rocephin on 12/16 and was transitioned to p.o. ciprofloxacin to complete treatment course for complicated UTI/early pyelonephritis. Treatment will be completed by tonight

## 2023-12-21 NOTE — SUBJECTIVE & OBJECTIVE
Interval History:  No acute overnight events.  Patient remained afebrile.  Feeling okay today. Mom not at bedside this AM     Review of Systems   Constitutional:  Positive for activity change. Negative for fatigue.   Respiratory:  Negative for cough and shortness of breath.    Cardiovascular:  Negative for chest pain.   Gastrointestinal:  Negative for abdominal pain, nausea and vomiting.        Suprapubic pain resolved   Genitourinary:  Negative for difficulty urinating, dysuria and hematuria.   Neurological:  Positive for weakness.   Psychiatric/Behavioral:  Negative for agitation.      Objective:     Vital Signs (Most Recent):  Temp: 98.7 °F (37.1 °C) (12/21/23 1200)  Pulse: 88 (12/21/23 1200)  Resp: 20 (12/21/23 1200)  BP: (!) 150/76 (12/21/23 1200)  SpO2: 97 % (12/21/23 1200) Vital Signs (24h Range):  Temp:  [97.8 °F (36.6 °C)-98.7 °F (37.1 °C)] 98.7 °F (37.1 °C)  Pulse:  [82-88] 88  Resp:  [17-20] 20  SpO2:  [97 %-98 %] 97 %  BP: (135-178)/(74-88) 150/76     Weight: 60.9 kg (134 lb 4.2 oz)  Body mass index is 19.26 kg/m².    Intake/Output Summary (Last 24 hours) at 12/21/2023 1246  Last data filed at 12/21/2023 0400  Gross per 24 hour   Intake 240 ml   Output 1100 ml   Net -860 ml           Physical Exam  Vitals and nursing note reviewed.   Constitutional:       General: He is not in acute distress.     Appearance: He is ill-appearing (chronically ill appearing).   HENT:      Mouth/Throat:      Mouth: Mucous membranes are moist.   Eyes:      Extraocular Movements: Extraocular movements intact.   Cardiovascular:      Rate and Rhythm: Normal rate and regular rhythm.   Pulmonary:      Effort: Pulmonary effort is normal.      Breath sounds: Normal breath sounds.   Abdominal:      General: There is no distension.      Palpations: Abdomen is soft.      Tenderness: There is no abdominal tenderness.   Musculoskeletal:         General: No swelling or tenderness.   Skin:     General: Skin is warm and dry.   Neurological:       Mental Status: He is alert and oriented to person, place, and time.      Motor: Weakness (diffuse weakness) present.   Psychiatric:      Comments: Calm on exam.              Significant Labs: All pertinent labs within the past 24 hours have been reviewed.    Significant Imaging: I have reviewed all pertinent imaging results/findings within the past 24 hours.

## 2023-12-21 NOTE — PLAN OF CARE
Problem: SLP  Goal: SLP Goal  Description: 1. Pt will tolerate IDDSI level 4 puree diet and thin liquids w/o overt s/s of aspiration. - discontinue goal 12/21/23    2. Pt will tolerate PO diet of mechanical soft consistency with thin liquids without overt s/s of aspiration.      Outcome: Ongoing, Progressing   Advanced pt's diet to mechanical soft.

## 2023-12-21 NOTE — PLAN OF CARE
[place] [facility]  Discharge Reassessment    Primary Care Provider: Lawanda Flores NP    Expected Discharge Date: 12/22/2023    Reassessment (most recent)       Discharge Reassessment - 12/21/23 1137          Discharge Reassessment    Assessment Type Discharge Planning Reassessment     Did the patient's condition or plan change since previous assessment? Yes     Discharge Plan discussed with: --   Rounding    Communicated ROLA with patient/caregiver Yes     Discharge Plan A Home Health     Discharge Plan B Home with family     DME Needed Upon Discharge  none     Transition of Care Barriers Does not adhere to care plan;Social     Why the patient remains in the hospital Requires continued medical care        Post-Acute Status    Coverage [facility] DUAL COMPLETE HMO SN     Discharge Delays None known at this time                   Patient to discharge to home with HH and followup with the ACT Team.  Anticipated discharge is 12/22/2023.

## 2023-12-21 NOTE — NURSING
[facility] [place]  Nurses Note -- 4 Eyes      12/21/2023       Skin assessed on: Q Shift      [x] No Pressure Injuries Present    []Prevention Measures Documented    [] Yes LDA  for Pressure Injury Previously documented     [] Yes New Pressure Injury Discovered   [] LDA for New Pressure Injury Added      Attending RN:  Lillian Victor RN     Second RN:  RITA Romeo

## 2023-12-21 NOTE — ASSESSMENT & PLAN NOTE
Patient with Acute on chronic debility due to severe malnutrition.  Evaluation for etiology is complete. Plan includes progressive mobility protocol initated, PT/OT consulted, and fall precautions in place.    -PT/OT consulted: recommends SNF  - has sent referrals to multiple facilities, no accepting [id number] yet.  -If no accepting facility then pt will have to go home with HH

## 2023-12-21 NOTE — NURSING
[facility] [place]  Nurses Note -- 4 Eyes      12/20/2023       Skin assessed on: Q Shift      [x] No Pressure Injuries Present    [x]Prevention Measures Documented    [] Yes LDA  for Pressure Injury Previously documented     [] Yes New Pressure Injury Discovered   [] LDA for New Pressure Injury Added      Attending RN:  Ousmane Law RN     Second RN:  RITA Crenshaw

## 2023-12-21 NOTE — PLAN OF CARE
Problem: Adult Inpatient Plan of Care  Goal: Optimal Comfort and Wellbeing  Outcome: Ongoing, Progressing  Intervention: Provide Person-Centered Care  Flowsheets (Taken 12/20/2023 1810)  Trust Relationship/Rapport:   care explained   questions encouraged   questions answered     Problem: Skin Injury Risk Increased  Goal: Skin Health and Integrity  Outcome: Ongoing, Progressing  Intervention: Optimize Skin Protection  Flowsheets (Taken 12/20/2023 1810)  Pressure Reduction Techniques:   frequent weight shift encouraged   weight shift assistance provided

## 2023-12-21 NOTE — PROGRESS NOTES
[facility] Medicine  Progress Note    Patient Name: Cali Mabry  MRN: [id number]  Patient Class: IP- Inpatient   Admission Date: 12/10/2023  Length of Stay: 11 days  Attending Physician: Uday Cramer DO  Primary Care Provider: Lawanda Flores NP        Subjective:     Principal Problem:Diabetic ketoacidosis without coma        HPI:    Cali Mabry is a 58 y.o. male who has a past medical history of Acute on chronic pancreatitis, Addiction to drug, CHF, DM type 1, HCV, HTN (hypertension), psychiatric care, IVDU (intravenous drug user), Pancreatitis, Psychiatric problem, Stroke, and Substance abuse, presented to the ED with CC of Generalized Fatigue.     Patient is unable to give much of a history. He says he takes his insulin regularly, but unable to give me even a guess as to how much he takes. Nurse states he told her he has not taken any insulin for the last couple days. He was BIB [facility] after he slid out of his chair and unable to get up. Given patient being a poor historian and no family at bedside, will put information gathered by ED:     Per chart review, patient was seen at [facility] on 11/05 for generalized weakness. Per chart review, patient was seen at [facility] on 11/07 for generalized weakness. The patient left AMA on both ED visits. Patient notes left hand trauma s/p falling out a chair. Denies head trauma and LOC. He endorses associated nausea, vomiting, diarrhea, general abdominal pain, fatigue, and visual disturbance. Patient notes compliance with insulin lispro and insulin detemir U-100. No medication PTA. No other exacerbating or alleviating factors. Patient denies fever, chills, headache, or other associated symptoms.     Patient currently denies any CP, AP or SOB.     Overview/Hospital Course:  Mr. Mabry is a 58 old gentleman with a past history TBI, bipolar disorder, CHF, diabetes, hepatitis-C, hypertension, history of polysubstance abuse including IVDU was  admitted to the hospital for DKA on 12/10/2023.  DKA resolved with insulin drip protocol.  Transition to subq insulin. Glucose labile. Monitoring closely. Patient was seen by psychiatry services and recommended to follow up with outpatient psychiatric clinic, no need for pec or inpatient psychiatric admission at this time.  Urine cx with Proteus mirabilis.  Denies any dysuria, but does complain suprapubic discomfort.  CT abdomen w/bilateral perinephric fat stranding, new since the previous exam.  The urinary bladder is mildly distended noting mild residual wall thickening.  Completed 5 days of Rocephin on 12/16 and was transitioned to p.o. ciprofloxacin to complete treatment course for complicated UTI/early pyelonephritis.  PT/OT consulted and recommend SNF. Awaiting placement. Patient will complete antibiotics course on 12/22/23. If no accepting facility, likely will have to discharge home with home health.     Interval History:  No acute overnight events.  Patient remained afebrile.  Feeling okay today. Mom not at bedside this AM     Review of Systems   Constitutional:  Positive for activity change. Negative for fatigue.   Respiratory:  Negative for cough and shortness of breath.    Cardiovascular:  Negative for chest pain.   Gastrointestinal:  Negative for abdominal pain, nausea and vomiting.        Suprapubic pain resolved   Genitourinary:  Negative for difficulty urinating, dysuria and hematuria.   Neurological:  Positive for weakness.   Psychiatric/Behavioral:  Negative for agitation.      Objective:     Vital Signs (Most Recent):  Temp: 98.7 °F (37.1 °C) (12/21/23 1200)  Pulse: 88 (12/21/23 1200)  Resp: 20 (12/21/23 1200)  BP: (!) 150/76 (12/21/23 1200)  SpO2: 97 % (12/21/23 1200) Vital Signs (24h Range):  Temp:  [97.8 °F (36.6 °C)-98.7 °F (37.1 °C)] 98.7 °F (37.1 °C)  Pulse:  [82-88] 88  Resp:  [17-20] 20  SpO2:  [97 %-98 %] 97 %  BP: (135-178)/(74-88) 150/76     Weight: 60.9 kg (134 lb 4.2 oz)  Body mass  index is 19.26 kg/m².    Intake/Output Summary (Last 24 hours) at 12/21/2023 1246  Last data filed at 12/21/2023 0400  Gross per 24 hour   Intake 240 ml   Output 1100 ml   Net -860 ml           Physical Exam  Vitals and nursing note reviewed.   Constitutional:       General: He is not in acute distress.     Appearance: He is ill-appearing (chronically ill appearing).   HENT:      Mouth/Throat:      Mouth: Mucous membranes are moist.   Eyes:      Extraocular Movements: Extraocular movements intact.   Cardiovascular:      Rate and Rhythm: Normal rate and regular rhythm.   Pulmonary:      Effort: Pulmonary effort is normal.      Breath sounds: Normal breath sounds.   Abdominal:      General: There is no distension.      Palpations: Abdomen is soft.      Tenderness: There is no abdominal tenderness.   Musculoskeletal:         General: No swelling or tenderness.   Skin:     General: Skin is warm and dry.   Neurological:      Mental Status: He is alert and oriented to person, place, and time.      Motor: Weakness (diffuse weakness) present.   Psychiatric:      Comments: Calm on exam.              Significant Labs: All pertinent labs within the past 24 hours have been reviewed.    Significant Imaging: I have reviewed all pertinent imaging results/findings within the past 24 hours.    Assessment/Plan:      * Diabetic ketoacidosis without coma  Patient's FSGs are uncontrolled due to hyperglycemia on current medication regimen.  Last A1c reviewed:  Lab Results   Component Value Date    HGBA1C 11.3 (H) 12/10/2023     Antihyperglycemics (From admission, onward)      Start     Stop Route Frequency Ordered    12/21/23 0715  insulin aspart U-100 pen 8 Units  (Order Panel)         -- SubQ 3 times daily with meals 12/21/23 0645    12/18/23 0900  insulin detemir U-100 (Levemir) pen 10 Units  (Order Panel)         -- SubQ 2 times daily 12/17/23 2226    12/17/23 2048  insulin aspart U-100 pen 0-5 Units         -- SubQ Before meals &  nightly PRN 12/17/23 1948          Hold Oral hypoglycemics while patient is in the hospital.  Patient reports that he does not know how much insulin he takes at home  Insulin infusion initiated upon admission; no longer requiring insulin gtt  Continue to monitor glucose trend and titrate insulin regimen as appropriate.  Pt has epidoes of hypoglycemia and hyperglyemia. Glucose labile  POCT BG q4h  Insulin dose adjusted  Meds: basal bolus insulin + SSI PRN to maintain goal 140-180. Titrate insulin as needed  ADA diet, accuchecks ACHS, hypoglycemic protocol    Acute cystitis without hematuria  -Urine culture from 12/10/2023 grew pan-sensitive Proteus.   -Completed 5 days of Rocephin on 12/16 and was transitioned to p.o. ciprofloxacin to complete treatment course for complicated UTI/early pyelonephritis. Treatment will be completed by tonight     Type 1 diabetes  See DKA note for plan  DKA resolved  Continue subq insulin     Delirium  -Noted episodes of intermittent agitation and drowsiness.  -Continue correction of metabolic derangements  -scheduled melatonin q.h.s. to regulate sleep-wake cycle  -will monitor on CIWA protocol. unlikely to be acute alcohol withdrawals as patient has now been admitted here for more than 7 days.  -possibly related to chronic psychiatric issues  -Maintain Delirium precautions: Maintain regular sleep cycle. Early ambulation. Minimal interruptions overnight. Re-orient patient frequently. Maintain adequate boregimen, hydration and electrolyte replenishments. Hearing Aids and eye glasses as needed.  -Resolved      Amphetamine use disorder, severe, dependence  Noted in history. No concern for withdrawal at this time.  UDS negative on admission   CIWA protocol    Opioid abuse  Noted in history. No concern for withdrawal thus far. Continue to monitor.    Chronic hepatitis C  Noted history. Outpatient follow-up to discuss treatment options if desired.      CAD (coronary artery disease)  Patient  with known CAD. No chest pain concerns.   Continue ASA, Statin, Plavix, BB.    COPD (chronic obstructive pulmonary disease)  Patient's COPD is controlled currently. Respiratory status stable.  Continue inhalers  prn  and monitor respiratory status closely.     Bipolar disorder  Noted in history.   Continue home medications: Risperidone, Venlafaxine, and Carbamazepine       Discharge planning issues  -Patient noted to be medically stable for discharge at this time  -Has been able to work with OT/PT who recommend SNF placement for further rehabilitation  -Patient pending placement, continue in-hospital care as stated above in the meantime  -CM/SW following, appreciate input   -Will place DC orders once placement has been secured        Debility  Patient with Acute on chronic debility due to severe malnutrition.  Evaluation for etiology is complete. Plan includes progressive mobility protocol initated, PT/OT consulted, and fall precautions in place.    -PT/OT consulted: recommends SNF  - has sent referrals to multiple facilities, no accepting [id number] yet.  -If no accepting facility then pt will have to go home with        VTE Risk Mitigation (From admission, onward)           Ordered     Place DAWIT hose  Until discontinued         12/10/23 1727     Place sequential compression device  Until discontinued         12/10/23 1727     IP VTE LOW RISK PATIENT  Once         12/10/23 1727                    Discharge Planning   ROLA: 12/22/2023     Code Status: Full Code   Is the patient medically ready for discharge?:     Reason for patient still in hospital (select all that apply): Treatment, PT / OT recommendations, and Pending disposition  Discharge Plan A: Home Health   Discharge Delays: None known at this time              Uday Cramer DO  Department of Hospital Medicine   [facility]

## 2023-12-22 LAB
POCT GLUCOSE: 122 MG/DL (ref 70–110)
POCT GLUCOSE: 199 MG/DL (ref 70–110)
POCT GLUCOSE: 245 MG/DL (ref 70–110)
POCT GLUCOSE: 88 MG/DL (ref 70–110)

## 2023-12-22 PROCEDURE — 25000003 PHARM REV CODE 250: Performed by: HOSPITALIST

## 2023-12-22 PROCEDURE — 25000003 PHARM REV CODE 250: Performed by: STUDENT IN AN ORGANIZED HEALTH CARE EDUCATION/TRAINING PROGRAM

## 2023-12-22 PROCEDURE — 25000003 PHARM REV CODE 250: Performed by: NURSE PRACTITIONER

## 2023-12-22 PROCEDURE — 11000001 HC ACUTE MED/SURG PRIVATE ROOM

## 2023-12-22 PROCEDURE — 97530 THERAPEUTIC ACTIVITIES: CPT | Mod: CO

## 2023-12-22 PROCEDURE — 97535 SELF CARE MNGMENT TRAINING: CPT

## 2023-12-22 PROCEDURE — A4216 STERILE WATER/SALINE, 10 ML: HCPCS | Performed by: HOSPITALIST

## 2023-12-22 PROCEDURE — 94761 N-INVAS EAR/PLS OXIMETRY MLT: CPT

## 2023-12-22 PROCEDURE — 25000003 PHARM REV CODE 250: Performed by: INTERNAL MEDICINE

## 2023-12-22 PROCEDURE — 92526 ORAL FUNCTION THERAPY: CPT

## 2023-12-22 RX ORDER — TALC
6 POWDER (GRAM) TOPICAL NIGHTLY
Qty: 30 TABLET | Refills: 0 | Status: ON HOLD | OUTPATIENT
Start: 2023-12-22 | End: 2024-03-11

## 2023-12-22 RX ORDER — FOLIC ACID 1 MG/1
1 TABLET ORAL DAILY
Qty: 90 TABLET | Refills: 3 | Status: SHIPPED | OUTPATIENT
Start: 2023-12-23 | End: 2024-12-22

## 2023-12-22 RX ORDER — GUAIFENESIN 600 MG/1
600 TABLET, EXTENDED RELEASE ORAL 2 TIMES DAILY
Status: DISCONTINUED | OUTPATIENT
Start: 2023-12-22 | End: 2023-12-24 | Stop reason: HOSPADM

## 2023-12-22 RX ORDER — GUAIFENESIN 600 MG/1
600 TABLET, EXTENDED RELEASE ORAL 2 TIMES DAILY
Qty: 20 TABLET | Refills: 0 | Status: SHIPPED | OUTPATIENT
Start: 2023-12-22 | End: 2024-01-01

## 2023-12-22 RX ORDER — LANOLIN ALCOHOL/MO/W.PET/CERES
100 CREAM (GRAM) TOPICAL DAILY
Qty: 90 TABLET | Refills: 0 | Status: SHIPPED | OUTPATIENT
Start: 2023-12-23

## 2023-12-22 RX ORDER — LOSARTAN POTASSIUM 50 MG/1
50 TABLET ORAL DAILY
Qty: 90 TABLET | Refills: 3 | Status: ON HOLD | OUTPATIENT
Start: 2023-12-23 | End: 2024-03-18 | Stop reason: HOSPADM

## 2023-12-22 RX ADMIN — INSULIN ASPART 8 UNITS: 100 INJECTION, SOLUTION INTRAVENOUS; SUBCUTANEOUS at 11:12

## 2023-12-22 RX ADMIN — THERA TABS 1 TABLET: TAB at 09:12

## 2023-12-22 RX ADMIN — RISPERIDONE 1 MG: 1 TABLET ORAL at 09:12

## 2023-12-22 RX ADMIN — INSULIN DETEMIR 10 UNITS: 100 INJECTION, SOLUTION SUBCUTANEOUS at 09:12

## 2023-12-22 RX ADMIN — CARBAMAZEPINE 200 MG: 100 TABLET, EXTENDED RELEASE ORAL at 09:12

## 2023-12-22 RX ADMIN — CLOPIDOGREL BISULFATE 75 MG: 75 TABLET ORAL at 09:12

## 2023-12-22 RX ADMIN — CARBAMAZEPINE 400 MG: 100 TABLET, EXTENDED RELEASE ORAL at 08:12

## 2023-12-22 RX ADMIN — ASPIRIN 81 MG CHEWABLE TABLET 81 MG: 81 TABLET CHEWABLE at 09:12

## 2023-12-22 RX ADMIN — POLYETHYLENE GLYCOL 3350 17 G: 17 POWDER, FOR SOLUTION ORAL at 09:12

## 2023-12-22 RX ADMIN — THIAMINE HCL TAB 100 MG 100 MG: 100 TAB at 09:12

## 2023-12-22 RX ADMIN — VENLAFAXINE HYDROCHLORIDE 75 MG: 75 CAPSULE, EXTENDED RELEASE ORAL at 09:12

## 2023-12-22 RX ADMIN — RISPERIDONE 1 MG: 1 TABLET ORAL at 08:12

## 2023-12-22 RX ADMIN — Medication 6 MG: at 08:12

## 2023-12-22 RX ADMIN — METOPROLOL SUCCINATE ER TABLETS 25 MG: 25 TABLET, FILM COATED, EXTENDED RELEASE ORAL at 09:12

## 2023-12-22 RX ADMIN — INSULIN ASPART 2 UNITS: 100 INJECTION, SOLUTION INTRAVENOUS; SUBCUTANEOUS at 11:12

## 2023-12-22 RX ADMIN — INSULIN DETEMIR 10 UNITS: 100 INJECTION, SOLUTION SUBCUTANEOUS at 08:12

## 2023-12-22 RX ADMIN — Medication 10 ML: at 05:12

## 2023-12-22 RX ADMIN — Medication 10 ML: at 11:12

## 2023-12-22 RX ADMIN — INSULIN ASPART 8 UNITS: 100 INJECTION, SOLUTION INTRAVENOUS; SUBCUTANEOUS at 04:12

## 2023-12-22 RX ADMIN — INSULIN ASPART 8 UNITS: 100 INJECTION, SOLUTION INTRAVENOUS; SUBCUTANEOUS at 07:12

## 2023-12-22 RX ADMIN — GUAIFENESIN 600 MG: 600 TABLET, EXTENDED RELEASE ORAL at 08:12

## 2023-12-22 RX ADMIN — ATORVASTATIN CALCIUM 20 MG: 10 TABLET, FILM COATED ORAL at 09:12

## 2023-12-22 RX ADMIN — LOSARTAN POTASSIUM 50 MG: 25 TABLET, FILM COATED ORAL at 09:12

## 2023-12-22 RX ADMIN — FOLIC ACID 1 MG: 1 TABLET ORAL at 09:12

## 2023-12-22 NOTE — PLAN OF CARE
Patient's mother stated that Marbella can also be contacted at [phone] to explain anything that the patient might need.

## 2023-12-22 NOTE — PLAN OF CARE
Plan for patient discharge with home health and ACT team. Placed called to Tamia with ACT ([phone]), left detailed voice message. Will need discharge summary and updated progress notes faxed to ACT team upon discharge (fax:[phone]).  Speech therapy eval and chest xray today with goal of discharge tomorrow.     Referral sent to [facility] via care port for review.     No accepting SNF/NH. Referrals has been sent to 106 facilities. Spoke with pt's mother, informed of plan to discharge home tomorrow, pt's mother stated to contact her niece, Marbella.     Placed multiple calls to Marbella ([phone], left detailed voice messages.     2:45pm Spoke with pt's cousin, Marbella regarding discharge planning. Inquired about hospital bed for home, physician notified.  Will need WC and bsc upon discharge. Declined bath bench at this time. Pt's mother to provide transportation home.     Spoke with Tamia with ACT team, informed of goal for dc tomorrow. CM to continue to follow for dc needs.    12/22/23 1246   Post-Acute Status   Post-Acute Authorization Placement   Post-Acute Placement Status Pending medical clearance/testing   Discharge Delays None known at this time   Discharge Plan   Discharge Plan A Home Health   Discharge Plan B Home Health;Home with family

## 2023-12-22 NOTE — PROGRESS NOTES
[facility] Medicine  Progress Note    Patient Name: Cali Mabry  MRN: [id number]  Patient Class: IP- Inpatient   Admission Date: 12/10/2023  Length of Stay: 12 days  Attending Physician: Jose Mitchell MD  Primary Care Provider: Lawanda Flores NP        Subjective:     Principal Problem:Diabetic ketoacidosis without coma        HPI:    Cali Mabry is a 58 y.o. male who has a past medical history of Acute on chronic pancreatitis, Addiction to drug, CHF, DM type 1, HCV, HTN (hypertension), psychiatric care, IVDU (intravenous drug user), Pancreatitis, Psychiatric problem, Stroke, and Substance abuse, presented to the ED with CC of Generalized Fatigue.     Patient is unable to give much of a history. He says he takes his insulin regularly, but unable to give me even a guess as to how much he takes. Nurse states he told her he has not taken any insulin for the last couple days. He was BIB [facility] after he slid out of his chair and unable to get up. Given patient being a poor historian and no family at bedside, will put information gathered by ED:     Per chart review, patient was seen at [facility] on 11/05 for generalized weakness. Per chart review, patient was seen at [facility] on 11/07 for generalized weakness. The patient left AMA on both ED visits. Patient notes left hand trauma s/p falling out a chair. Denies head trauma and LOC. He endorses associated nausea, vomiting, diarrhea, general abdominal pain, fatigue, and visual disturbance. Patient notes compliance with insulin lispro and insulin detemir U-100. No medication PTA. No other exacerbating or alleviating factors. Patient denies fever, chills, headache, or other associated symptoms.     Patient currently denies any CP, AP or SOB.     Overview/Hospital Course:  Mr. Mabry is a 58 old gentleman with a past history TBI, bipolar disorder, CHF, diabetes, hepatitis-C, hypertension, history of polysubstance abuse including IVDU was  admitted to the hospital for DKA on 12/10/2023.  DKA resolved with insulin drip protocol.  Transition to subq insulin. Glucose labile. Monitoring closely. Patient was seen by psychiatry services and recommended to follow up with outpatient psychiatric clinic, no need for pec or inpatient psychiatric admission at this time.  Urine cx with Proteus mirabilis.  Denies any dysuria, but does complain suprapubic discomfort.  CT abdomen w/bilateral perinephric fat stranding, new since the previous exam.  The urinary bladder is mildly distended noting mild residual wall thickening.  Completed 5 days of Rocephin on 12/16 and was transitioned to p.o. ciprofloxacin to complete treatment course for complicated UTI/early pyelonephritis.  PT/OT consulted and recommend SNF. Awaiting placement. Patient will complete antibiotics course on 12/22/23. If no accepting facility, likely will have to discharge home with home health.     Interval History: Patient seen and examined. NAD noted. His mother is at bedside. Patient mother stating patient having issues swallowing and coughing. He also making a new noise from his throat. Speech therapy at bedside re-evaluating patient. Ordered mucinex and CXR. Hold discharge re-eval in am. Patient will discharge home with his mother. No accepting facilities.     Review of Systems   Constitutional:  Positive for activity change. Negative for fatigue.   Respiratory:  Negative for cough and shortness of breath.    Cardiovascular:  Negative for chest pain.   Gastrointestinal:  Negative for abdominal pain, nausea and vomiting.        Suprapubic pain resolved   Genitourinary:  Negative for difficulty urinating, dysuria and hematuria.   Neurological:  Positive for weakness.   Psychiatric/Behavioral:  Negative for agitation.      Objective:     Vital Signs (Most Recent):  Temp: 97 °F (36.1 °C) (12/22/23 0723)  Pulse: 78 (12/22/23 0918)  Resp: 18 (12/22/23 0723)  BP: 135/79 (12/22/23 0918)  SpO2: 97 %  (12/22/23 0723) Vital Signs (24h Range):  Temp:  [97 °F (36.1 °C)-98.7 °F (37.1 °C)] 97 °F (36.1 °C)  Pulse:  [71-89] 78  Resp:  [16-20] 18  SpO2:  [95 %-97 %] 97 %  BP: (119-169)/(67-87) 135/79     Weight: 60.9 kg (134 lb 4.2 oz)  Body mass index is 19.26 kg/m².    Intake/Output Summary (Last 24 hours) at 12/22/2023 0954  Last data filed at 12/21/2023 2044  Gross per 24 hour   Intake 480 ml   Output 450 ml   Net 30 ml         Physical Exam  Vitals and nursing note reviewed.   Constitutional:       General: He is not in acute distress.     Appearance: He is ill-appearing (chronically ill appearing).   HENT:      Mouth/Throat:      Mouth: Mucous membranes are moist.   Eyes:      Extraocular Movements: Extraocular movements intact.   Cardiovascular:      Rate and Rhythm: Normal rate and regular rhythm.   Pulmonary:      Effort: Pulmonary effort is normal.      Breath sounds: Normal breath sounds.   Abdominal:      General: There is no distension.      Palpations: Abdomen is soft.      Tenderness: There is no abdominal tenderness.   Musculoskeletal:         General: No swelling or tenderness.   Skin:     General: Skin is warm and dry.   Neurological:      Mental Status: He is alert and oriented to person, place, and time.      Motor: Weakness (diffuse weakness) present.   Psychiatric:      Comments: Calm on exam.              Significant Labs: All pertinent labs within the past 24 hours have been reviewed.    Significant Imaging: I have reviewed all pertinent imaging results/findings within the past 24 hours.    Assessment/Plan:      * Diabetic ketoacidosis without coma  Patient's FSGs are uncontrolled due to hyperglycemia on current medication regimen.  Last A1c reviewed:  Lab Results   Component Value Date    HGBA1C 11.3 (H) 12/10/2023     Antihyperglycemics (From admission, onward)      Start     Stop Route Frequency Ordered    12/21/23 0715  insulin aspart U-100 pen 8 Units  (Order Panel)         -- SubQ 3 times  daily with meals 12/21/23 0645    12/18/23 0900  insulin detemir U-100 (Levemir) pen 10 Units  (Order Panel)         -- SubQ 2 times daily 12/17/23 2226    12/17/23 2048  insulin aspart U-100 pen 0-5 Units         -- SubQ Before meals & nightly PRN 12/17/23 1948          Hold Oral hypoglycemics while patient is in the hospital.  Patient reports that he does not know how much insulin he takes at home  Insulin infusion initiated upon admission; no longer requiring insulin gtt  Continue to monitor glucose trend and titrate insulin regimen as appropriate.  Pt has epidoes of hypoglycemia and hyperglyemia. Glucose labile  POCT BG q4h  Insulin dose adjusted  Meds: basal bolus insulin + SSI PRN to maintain goal 140-180. Titrate insulin as needed  ADA diet, accuchecks ACHS, hypoglycemic protocol    Debility  Patient with Acute on chronic debility due to severe malnutrition.  Evaluation for etiology is complete. Plan includes progressive mobility protocol initated, PT/OT consulted, and fall precautions in place.    -PT/OT consulted: recommends SNF  - has sent referrals to multiple facilities, no accepting [id number] yet.  -If no accepting facility then pt will have to go home with HH   -patient needs a wheelchair on discharge, he will be in wheelchair likely 6-8 hours a day. Patient has a mobility limitation that significantly impairs his ability to participate in one or more mobility related activities of daily living (MRADL's) such as toileting, feeding, dressing, grooming, and bathing in customary locations in the home. The mobility limitation cannot be sufficiently resolved by the use of a cane or walker. The use of a manual wheelchair will significantly improve the patient's ability to participate in MRADLS and the patient will use it on regular basis in the home. Patient has expressed he willingness to use a manual wheelchair in the home. He also has a caregiver who is available, willing, and able to provide  assistance with the wheelchair when needed.       Delirium  -Noted episodes of intermittent agitation and drowsiness.  -Continue correction of metabolic derangements  -scheduled melatonin q.h.s. to regulate sleep-wake cycle  -will monitor on [facility] protocol. unlikely to be acute alcohol withdrawals as patient has now been admitted here for more than 7 days.  -possibly related to chronic psychiatric issues  -Maintain Delirium precautions: Maintain regular sleep cycle. Early ambulation. Minimal interruptions overnight. Re-orient patient frequently. Maintain adequate boregimen, hydration and electrolyte replenishments. Hearing Aids and eye glasses as needed.  -Resolved      Discharge planning issues  -Has been able to work with OT/PT who recommend SNF placement for further rehabilitation  -Patient pending placement, continue in-hospital care as stated above in the meantime  -CM/SW following, appreciate input   -patient will discharge home with his mother. Once medically stable.       Acute cystitis without hematuria  -Urine culture from 12/10/2023 grew pan-sensitive Proteus.   -Completed 5 days of Rocephin on 12/16 and was transitioned to p.o. ciprofloxacin to complete treatment course for complicated UTI/early pyelonephritis. Treatment will be completed by tonight     Bipolar disorder  Noted in history.   Continue home medications: Risperidone, Venlafaxine, and Carbamazepine       Amphetamine use disorder, severe, dependence  Noted in history. No concern for withdrawal at this time.  UDS negative on admission   [facility] protocol    Type 1 diabetes  See DKA note for plan  DKA resolved  Continue subq insulin     CAD (coronary artery disease)  Patient with known CAD. No chest pain concerns.   Continue ASA, Statin, Plavix, BB.     COPD (chronic obstructive pulmonary disease)  Patient's COPD is controlled currently. Respiratory status stable.  Continue inhalers  prn  and monitor respiratory status closely.     Opioid  abuse  Noted in history. No concern for withdrawal thus far. Continue to monitor.    Chronic hepatitis C  Noted history. Outpatient follow-up to discuss treatment options if desired.        VTE Risk Mitigation (From admission, onward)           Ordered     Place DAWIT hose  Until discontinued         12/10/23 1727     Place sequential compression device  Until discontinued         12/10/23 1727     IP VTE LOW RISK PATIENT  Once         12/10/23 1727                    Discharge Planning   ROLA: 12/23/2023     Code Status: Full Code   Is the patient medically ready for discharge?:     Reason for patient still in hospital (select all that apply): Patient trending condition  Discharge Plan A: Home Health   Discharge Delays: None known at this time              Yandy Ballesteros NP  Department of Hospital Medicine   [facility]

## 2023-12-22 NOTE — PLAN OF CARE
12/22/23 1457   Medicare Message   Important Message from Medicare regarding Discharge Appeal Rights Given to patient/caregiver;Explained to patient/caregiver  (CM spoke with pt's cousin Marbella ([phone]) via phone, verbalized understanding Copy mailed certified to address in chart. [phone])   Date IMM was signed 12/22/23   Time IMM was signed [id number]

## 2023-12-22 NOTE — PLAN OF CARE
Problem: SLP  Goal: SLP Goal  Description: 1. Pt will tolerate IDDSI level 4 puree diet and thin liquids w/o overt s/s of aspiration. - discontinue goal 12/21/23    2. Pt will tolerate PO diet of mechanical soft consistency with thin liquids without overt s/s of aspiration.      Outcome: Ongoing, Progressing   Tolerating [facility] soft diet w/o pocketing or oral holding this session.

## 2023-12-22 NOTE — PLAN OF CARE
Problem: Adult Inpatient Plan of Care  Goal: Plan of Care Review  Outcome: Ongoing, Progressing  Flowsheets (Taken 12/22/2023 0824)  Plan of Care Reviewed With: patient  Goal: Patient-Specific Goal (Individualized)  Outcome: Ongoing, Progressing  Goal: Absence of Hospital-Acquired Illness or Injury  Outcome: Ongoing, Progressing  Intervention: Identify and Manage Fall Risk  Flowsheets (Taken 12/22/2023 0824)  Safety Promotion/Fall Prevention:   bed alarm set   side rails raised x 2   instructed to call staff for mobility  Intervention: Prevent Skin Injury  Flowsheets (Taken 12/22/2023 0824)  Body Position: weight shifting  Skin Protection: adhesive use limited  Intervention: Prevent and Manage VTE (Venous Thromboembolism) Risk  Flowsheets (Taken 12/22/2023 0824)  Activity Management: Arm raise - L1  Range of Motion: active ROM (range of motion) encouraged  Goal: Optimal Comfort and Wellbeing  Outcome: Ongoing, Progressing  Intervention: Monitor Pain and Promote Comfort  Flowsheets (Taken 12/22/2023 0824)  Pain Management Interventions:   care clustered   relaxation techniques promoted   quiet environment facilitated  Goal: Readiness for Transition of Care  Outcome: Ongoing, Progressing     Problem: Diabetic Ketoacidosis  Goal: Fluid and Electrolyte Balance with Absence of Ketosis  Outcome: Ongoing, Progressing  Intervention: Monitor and Manage Ketoacidosis  Flowsheets (Taken 12/22/2023 0824)  Glycemic Management: blood glucose monitored     Problem: Diabetes Comorbidity  Goal: Blood Glucose Level Within Targeted Range  Outcome: Ongoing, Progressing     Problem: Infection  Goal: Absence of Infection Signs and Symptoms  Outcome: Ongoing, Progressing     Problem: Skin Injury Risk Increased  Goal: Skin Health and Integrity  Outcome: Ongoing, Progressing     Problem: Impaired Wound Healing  Goal: Optimal Wound Healing  Outcome: Ongoing, Progressing     Problem: Fall Injury Risk  Goal: Absence of Fall and Fall-Related  Injury  Outcome: Ongoing, Progressing     Problem: Thought Process Alteration  Goal: Optimal Thought Clarity  Outcome: Ongoing, Progressing

## 2023-12-22 NOTE — PLAN OF CARE
Problem: Occupational Therapy  Goal: Occupational Therapy Goal  Description: Goals to be met by: 12/25/23     Patient will increase functional independence with ADLs by performing:    Feeding with Modified Covert.  UE Dressing with Modified Covert.  LE Dressing with Stand-by Assistance.  Grooming while seated with Supervision.  Supine to sit with Supervision.  Upper extremity exercise program x10 reps per handout, with supervision.  Toilet transfer to bedside commode via stand pivot with Minimal Assistance    Toileting from bedside commode for hygiene and clothing management with Minimal Assistance  Stand pivot transfer with Minimal Assistance  W/c manual propulsion ~50 ft to increase functional mobility within household for ADL participation      Step transfer- to be determined pending further pt participation     Outcome: Ongoing, Progressing

## 2023-12-22 NOTE — PT/OT/SLP PROGRESS
Speech Language Pathology Treatment    Patient Name:  Cali Mabry   MRN:  [id number]  Admitting Diagnosis: Diabetic ketoacidosis without coma    Recommendations:                 General Recommendations:  Dysphagia therapy  Diet recommendations:  Minced & Moist Diet - IDDSI Level 5, Liquid Diet Level: Thin liquids - IDDSI Level 0   Aspiration Precautions: 1 bite/sip at a time, Alternating bites/sips, Assistance with meals, Check for pocketing/oral residue, and Small bites/sips   General Precautions: Standard, other (see comments) (mechanical soft)  Communication strategies:  provide increased time to answer    Assessment:     Cali Mabry is a 58 y.o. male with a dx of dx of Diabetic ketoacidosis without coma. He presents with dysphagia negatively impacted by ongoing confusion. Pt tolerating mechanical soft diet without oral holding or pocketing this session. No swallowing difficulty reported by pt or nurse.  Subjective     Pt awake upon SLP arrival with mother at bedside for ST session. Pt's mother stated pt was eating and swallowing all textures, however, started having difficulty swallowing two days prior to admit exhibited by holding food in oral cavity.    Pain/Comfort:  Pain Rating 1: 0/10    Respiratory Status: Room air    Objective:     Has the patient been evaluated by SLP for swallowing?   Yes  Keep patient NPO? No   Current Respiratory Status:        ST and RN Cathy repositioned pt in bed prior to administering PO trials. Pt observed to cough following repositioning with elevated head of bed. Pt unable to produce productive cough even though he appeared to exhibit pharyngeal secretions.   Pt requested cereal and warm tea. ST assisted pt with feeding and provided ongoing education regarding aspiration precautions to pt and mother who both v/u of education provided. Pt accepted and consumed multiple trials of cereal and milk and straw swallows of tea. Prolonged mastication observed with mixed  consistency of cereal, however, no pocketing or oral holding observed this session. No overt s/s of aspiration was observed across trials. Thin liquids swallows were unremarkable.    Goals:   Multidisciplinary Problems       SLP Goals          Problem: SLP    Goal Priority Disciplines Outcome   SLP Goal     SLP Ongoing, Progressing   Description: 1. Pt will tolerate IDDSI level 4 puree diet and thin liquids w/o overt s/s of aspiration. - discontinue goal 12/21/23    2. Pt will tolerate PO diet of mechanical soft consistency with thin liquids without overt s/s of aspiration.                           Plan:     Patient to be seen:  2 x/week   Plan of Care expires:     Plan of Care reviewed with:  patient, mother   SLP Follow-Up:  Yes       Discharge recommendations:      Barriers to Discharge:  None    Time Tracking:     SLP Treatment Date:   12/22/23  Speech Start Time:  0942  Speech Stop Time:  1006     Speech Total Time (min):  24 min    Billable Minutes: Treatment Swallowing Dysfunction 15 min and Self Care/Home Management Training 9 min    12/22/2023

## 2023-12-22 NOTE — NURSING
Scheduled medications given w/out difficulty. BS 82; pt given juice and pudding. Rechecked . Will cont to monitor

## 2023-12-22 NOTE — SUBJECTIVE & OBJECTIVE
Interval History: Patient seen and examined. NAD noted. His mother is at bedside. Patient mother stating patient having issues swallowing and coughing. He also making a new noise from his throat. Speech therapy at bedside re-evaluating patient. Ordered mucinex and CXR. Hold discharge re-eval in am. Patient will discharge home with his mother. No accepting facilities.     Review of Systems   Constitutional:  Positive for activity change. Negative for fatigue.   Respiratory:  Negative for cough and shortness of breath.    Cardiovascular:  Negative for chest pain.   Gastrointestinal:  Negative for abdominal pain, nausea and vomiting.        Suprapubic pain resolved   Genitourinary:  Negative for difficulty urinating, dysuria and hematuria.   Neurological:  Positive for weakness.   Psychiatric/Behavioral:  Negative for agitation.      Objective:     Vital Signs (Most Recent):  Temp: 97 °F (36.1 °C) (12/22/23 0723)  Pulse: 78 (12/22/23 0918)  Resp: 18 (12/22/23 0723)  BP: 135/79 (12/22/23 0918)  SpO2: 97 % (12/22/23 0723) Vital Signs (24h Range):  Temp:  [97 °F (36.1 °C)-98.7 °F (37.1 °C)] 97 °F (36.1 °C)  Pulse:  [71-89] 78  Resp:  [16-20] 18  SpO2:  [95 %-97 %] 97 %  BP: (119-169)/(67-87) 135/79     Weight: 60.9 kg (134 lb 4.2 oz)  Body mass index is 19.26 kg/m².    Intake/Output Summary (Last 24 hours) at 12/22/2023 0954  Last data filed at 12/21/2023 2044  Gross per 24 hour   Intake 480 ml   Output 450 ml   Net 30 ml         Physical Exam  Vitals and nursing note reviewed.   Constitutional:       General: He is not in acute distress.     Appearance: He is ill-appearing (chronically ill appearing).   HENT:      Mouth/Throat:      Mouth: Mucous membranes are moist.   Eyes:      Extraocular Movements: Extraocular movements intact.   Cardiovascular:      Rate and Rhythm: Normal rate and regular rhythm.   Pulmonary:      Effort: Pulmonary effort is normal.      Breath sounds: Normal breath sounds.   Abdominal:       General: There is no distension.      Palpations: Abdomen is soft.      Tenderness: There is no abdominal tenderness.   Musculoskeletal:         General: No swelling or tenderness.   Skin:     General: Skin is warm and dry.   Neurological:      Mental Status: He is alert and oriented to person, place, and time.      Motor: Weakness (diffuse weakness) present.   Psychiatric:      Comments: Calm on exam.              Significant Labs: All pertinent labs within the past 24 hours have been reviewed.    Significant Imaging: I have reviewed all pertinent imaging results/findings within the past 24 hours.

## 2023-12-22 NOTE — PT/OT/SLP PROGRESS
Occupational Therapy   Treatment    Name: Cali Mabry  MRN: [id number]  Admitting Diagnosis:  Diabetic ketoacidosis without coma       Recommendations:     Discharge Recommendations: Moderate Intensity Therapy  Discharge Equipment Recommendations:  bath bench, bedside commode, wheelchair  Barriers to discharge:       Assessment:     Cali Mabry is a 58 y.o. male with a medical diagnosis of Diabetic ketoacidosis without coma. Performance deficits affecting function are weakness, impaired endurance, impaired self care skills, impaired functional mobility, gait instability, impaired balance, impaired cognition, decreased coordination, decreased upper extremity function, decreased lower extremity function, decreased safety awareness, visual deficits.     Pt tolerated Tx session fairly. Pt w/ flat affect and AMS. Upon initial entry Pt seemingly motivated and in agreement w/ session, however once therapy initiated Pt w/ poor effort and engagement in session. Pt w/ poor/altered insight and requires significant assist w/ funct'l mobility and self care @ present. Pt will cont to benefit from skilled therapy services while in hospital to maximize return to PLOF, increase safety/independence w/ ADLs and decrease burden of care on caregiver(s).    Rehab Prognosis:  Fair; patient would benefit from acute skilled OT services to address these deficits and reach maximum level of function.       Plan:     Patient to be seen  (3-5x/week) to address the above listed problems via self-care/home management, therapeutic activities, therapeutic exercises  Plan of Care Expires: 12/26/23  Plan of Care Reviewed with: patient    Subjective     Chief Complaint: Pt did not verbalize c/o  Patient/Family Comments/goals: Pt stating he wished to be left comfortable  Pain/Comfort:  Pain Rating 1: 0/10    Objective:     Communicated with: RITA Snyder prior to session.  Patient found HOB elevated with Condom Catheter, telemetry, PICC line,  Other (comments) (tele-sitter (Brittani)) upon OT entry to room.    General Precautions: Standard, fall, pureed diet    Orthopedic Precautions:N/A  Braces: N/A  Respiratory Status: Room air     Occupational Performance:     Bed Mobility:    N/A  Pt declined t/f EOB and all other bed mobility, despite initially in agreement to complete EOB g/h and TE    Functional Mobility/Transfers:  N/A...See Bed Mobility    Activities of Daily Living:  Grooming: stand by assistance w/ SetupA to perform simple facial g/h and comb hair while sitting up in bed w/ HOB elevated      AMPA 6 Click ADL: 12    Treatment & Education:  -Pt performed AA/AROM 1x5 BUE (digits I-V flex/ext, forearm pronation/supination, and elbow flex/ext) w/ SBA while sitting up in bed w/ verbal and visual cues..Pt prematurely ceasing TE  *Role of SLATER/L per POC  *Safety  *Call bell use  *Benefits of EOB w/ assist    Patient left HOB elevated with all lines intact, call button in reach, and bed alarm on, and RN notified.    GOALS:   Multidisciplinary Problems       Occupational Therapy Goals          Problem: Occupational Therapy    Goal Priority Disciplines Outcome Interventions   Occupational Therapy Goal     OT, PT/OT Ongoing, Progressing    Description: Goals to be met by: 12/25/23     Patient will increase functional independence with ADLs by performing:    Feeding with Modified Dukes.  UE Dressing with Modified Dukes.  LE Dressing with Stand-by Assistance.  Grooming while seated with Supervision.  Supine to sit with Supervision.  Upper extremity exercise program x10 reps per handout, with supervision.  Toilet transfer to bedside commode via stand pivot with Minimal Assistance    Toileting from bedside commode for hygiene and clothing management with Minimal Assistance  Stand pivot transfer with Minimal Assistance  W/c manual propulsion ~50 ft to increase functional mobility within household for ADL participation      Step transfer- to be  determined pending further pt participation                          Time Tracking:     OT Date of Treatment: 12/22/23  OT Start Time: 1210  OT Stop Time: 1221  OT Total Time (min): 11 min    Billable Minutes:Therapeutic Activity 11    OT/LEONCIO: LEONCIO     Number of LEONCIO visits since last OT visit: 1    12/22/2023

## 2023-12-22 NOTE — ASSESSMENT & PLAN NOTE
-Has been able to work with OT/PT who recommend SNF placement for further rehabilitation  -Patient pending placement, continue in-hospital care as stated above in the meantime  -CM/SW following, appreciate input   -patient will discharge home with his mother. Once medically stable.

## 2023-12-23 VITALS
RESPIRATION RATE: 19 BRPM | BODY MASS INDEX: 19.22 KG/M2 | DIASTOLIC BLOOD PRESSURE: 87 MMHG | HEIGHT: 70 IN | TEMPERATURE: 98 F | SYSTOLIC BLOOD PRESSURE: 152 MMHG | WEIGHT: 134.25 LBS | OXYGEN SATURATION: 96 % | HEART RATE: 80 BPM

## 2023-12-23 LAB
POCT GLUCOSE: 136 MG/DL (ref 70–110)
POCT GLUCOSE: 140 MG/DL (ref 70–110)
POCT GLUCOSE: 87 MG/DL (ref 70–110)

## 2023-12-23 PROCEDURE — 25000003 PHARM REV CODE 250: Performed by: INTERNAL MEDICINE

## 2023-12-23 PROCEDURE — 25000003 PHARM REV CODE 250: Performed by: STUDENT IN AN ORGANIZED HEALTH CARE EDUCATION/TRAINING PROGRAM

## 2023-12-23 PROCEDURE — 11000001 HC ACUTE MED/SURG PRIVATE ROOM

## 2023-12-23 PROCEDURE — A4216 STERILE WATER/SALINE, 10 ML: HCPCS | Performed by: HOSPITALIST

## 2023-12-23 PROCEDURE — 25000003 PHARM REV CODE 250: Performed by: NURSE PRACTITIONER

## 2023-12-23 PROCEDURE — 25000003 PHARM REV CODE 250: Performed by: HOSPITALIST

## 2023-12-23 PROCEDURE — 92526 ORAL FUNCTION THERAPY: CPT

## 2023-12-23 RX ADMIN — METOPROLOL SUCCINATE ER TABLETS 25 MG: 25 TABLET, FILM COATED, EXTENDED RELEASE ORAL at 08:12

## 2023-12-23 RX ADMIN — RISPERIDONE 1 MG: 1 TABLET ORAL at 08:12

## 2023-12-23 RX ADMIN — INSULIN ASPART 8 UNITS: 100 INJECTION, SOLUTION INTRAVENOUS; SUBCUTANEOUS at 07:12

## 2023-12-23 RX ADMIN — CARBAMAZEPINE 200 MG: 100 TABLET, EXTENDED RELEASE ORAL at 08:12

## 2023-12-23 RX ADMIN — Medication 10 ML: at 05:12

## 2023-12-23 RX ADMIN — GUAIFENESIN 600 MG: 600 TABLET, EXTENDED RELEASE ORAL at 08:12

## 2023-12-23 RX ADMIN — THERA TABS 1 TABLET: TAB at 08:12

## 2023-12-23 RX ADMIN — THIAMINE HCL TAB 100 MG 100 MG: 100 TAB at 08:12

## 2023-12-23 RX ADMIN — VENLAFAXINE HYDROCHLORIDE 75 MG: 75 CAPSULE, EXTENDED RELEASE ORAL at 08:12

## 2023-12-23 RX ADMIN — POLYETHYLENE GLYCOL 3350 17 G: 17 POWDER, FOR SOLUTION ORAL at 08:12

## 2023-12-23 RX ADMIN — Medication 10 ML: at 12:12

## 2023-12-23 RX ADMIN — INSULIN DETEMIR 10 UNITS: 100 INJECTION, SOLUTION SUBCUTANEOUS at 08:12

## 2023-12-23 RX ADMIN — FOLIC ACID 1 MG: 1 TABLET ORAL at 08:12

## 2023-12-23 RX ADMIN — ASPIRIN 81 MG CHEWABLE TABLET 81 MG: 81 TABLET CHEWABLE at 08:12

## 2023-12-23 RX ADMIN — LOSARTAN POTASSIUM 50 MG: 25 TABLET, FILM COATED ORAL at 08:12

## 2023-12-23 RX ADMIN — INSULIN ASPART 8 UNITS: 100 INJECTION, SOLUTION INTRAVENOUS; SUBCUTANEOUS at 04:12

## 2023-12-23 RX ADMIN — CLOPIDOGREL BISULFATE 75 MG: 75 TABLET ORAL at 08:12

## 2023-12-23 RX ADMIN — ATORVASTATIN CALCIUM 20 MG: 10 TABLET, FILM COATED ORAL at 08:12

## 2023-12-23 RX ADMIN — CARBAMAZEPINE 400 MG: 100 TABLET, EXTENDED RELEASE ORAL at 08:12

## 2023-12-23 RX ADMIN — Medication 6 MG: at 08:12

## 2023-12-23 RX ADMIN — INSULIN ASPART 8 UNITS: 100 INJECTION, SOLUTION INTRAVENOUS; SUBCUTANEOUS at 11:12

## 2023-12-23 NOTE — PT/OT/SLP PROGRESS
Speech Language Pathology Treatment/Discharge    Patient Name:  Cali Mabry   MRN:  [id number]  Admitting Diagnosis: Diabetic ketoacidosis without coma    Recommendations:                 General Recommendations:  Dysphagia therapy  Diet recommendations:  Minced & Moist Diet - IDDSI Level 5, Liquid Diet Level: Thin liquids - IDDSI Level 0   Aspiration Precautions: 1 bite/sip at a time and Assistance with meals   General Precautions: Standard, other (see comments) ([facility] soft)  Communication strategies:  provide increased time to answer    Assessment:     Cali Mabry is a 58 y.o. male with a dx of Diabetic ketoacidosis without coma. Pt presents with functional swallow for soft solids. NO further ST is indicated at this time.    Subjective   Pt alert & oriented to self upon SLP arrival. Pt agreeable to accept therapeutic PO trials for tx session. Pt stated he is being discharged home today.      Pain/Comfort:  Pain Rating 1: 0/10    Respiratory Status: Room air    Objective:     Has the patient been evaluated by SLP for swallowing?   Yes  Keep patient NPO? No   Current Respiratory Status:        Pt accepted and consumed multiple trials of thin liquids via straw, 5 trials of pudding, and 3 trials of soft solids. Pt exhibited prolonged mastication of soft solids, however, no lingual stasis or pocketing noted. No s/s of aspiration observed across trials/ consistencies. Pt reported he does not have dentures at home and eats fine without them.    Goals:   Multidisciplinary Problems       SLP Goals       Not on file              Multidisciplinary Problems (Resolved)          Problem: SLP    Goal Priority Disciplines Outcome   SLP Goal   (Resolved)     SLP Met   Description: 1. Pt will tolerate IDDSI level 4 puree diet and thin liquids w/o overt s/s of aspiration. - discontinue goal 12/21/23    2. Pt will tolerate PO diet of mechanical soft consistency with thin liquids without overt s/s of aspiration.- GOAL MET  12/23/23                           Plan:     Patient to be seen:  2 x/week   Plan of Care expires:     Plan of Care reviewed with:  patient   SLP Follow-Up:  No       Discharge recommendations:      Barriers to Discharge:  None    Time Tracking:     SLP Treatment Date:   12/23/23  Speech Start Time:  1054  Speech Stop Time:  1107     Speech Total Time (min):  13 min    Billable Minutes: Treatment Swallowing Dysfunction 13 min    12/23/2023

## 2023-12-23 NOTE — DISCHARGE INSTRUCTIONS
Alcoholics [employer] [place] 24 Hour Hotline: [phone]      Call 911 or the crisis line at: 988 or [phone] for help in a crisis and emergent situations

## 2023-12-23 NOTE — DISCHARGE SUMMARY
[facility] Medicine  Discharge Summary      Patient Name: Cali Mabry  MRN: [id number]  [facility]: [id number]  Patient Class: IP- Inpatient  Admission Date: 12/10/2023  Hospital Length of Stay: 13 days  Discharge Date and Time:  12/23/2023 8:49 AM  Attending Physician: Jose Mitchell MD   Discharging Provider: Jose Mitchell MD  Primary Care Provider: Lawanda Flores NP    Primary Care Team: Networked reference to record PCT     HPI:     Cali Mabry is a 58 y.o. male who has a past medical history of Acute on chronic pancreatitis, Addiction to drug, CHF, DM type 1, HCV, HTN (hypertension), psychiatric care, IVDU (intravenous drug user), Pancreatitis, Psychiatric problem, Stroke, and Substance abuse, presented to the ED with CC of Generalized Fatigue.     Patient is unable to give much of a history. He says he takes his insulin regularly, but unable to give me even a guess as to how much he takes. Nurse states he told her he has not taken any insulin for the last couple days. He was BIB [facility] after he slid out of his chair and unable to get up. Given patient being a poor historian and no family at bedside, will put information gathered by ED:     Per chart review, patient was seen at [facility] on 11/05 for generalized weakness. Per chart review, patient was seen at [facility] on 11/07 for generalized weakness. The patient left AMA on both ED visits. Patient notes left hand trauma s/p falling out a chair. Denies head trauma and LOC. He endorses associated nausea, vomiting, diarrhea, general abdominal pain, fatigue, and visual disturbance. Patient notes compliance with insulin lispro and insulin detemir U-100. No medication PTA. No other exacerbating or alleviating factors. Patient denies fever, chills, headache, or other associated symptoms.     Patient currently denies any CP, AP or SOB.     * No surgery found *      Hospital Course:   Mr. Mabry is a 58 old gentleman with a past history TBI,  bipolar disorder, CHF, diabetes, hepatitis-C, hypertension, history of polysubstance abuse including IVDU was admitted to the hospital for DKA on 12/10/2023.  DKA resolved with insulin drip protocol.  Transition to subq insulin. Glucose labile. Monitoring closely. Patient was seen by psychiatry services and recommended to follow up with outpatient psychiatric clinic, no need for pec or inpatient psychiatric admission at this time.  Urine cx with Proteus mirabilis.  Denies any dysuria, but does complain suprapubic discomfort.  CT abdomen w/bilateral perinephric fat stranding, new since the previous exam.  The urinary bladder is mildly distended noting mild residual wall thickening.  Completed 5 days of Rocephin on 12/16 and was transitioned to p.o. ciprofloxacin to complete treatment course for complicated UTI/early pyelonephritis.  PT/OT consulted and recommend SNF. Awaiting placement. Patient will complete antibiotics course on 12/22/23. If no accepting facility, likely will have to discharge home with home health.       Rolling Walker  The mobility limitation cannot be sufficiently resolved by the use of a cane.   Patient's functional mobility deficit can be sufficiently resolved with the use of a Rolling Walker   Patient's mobility limitation significantly impairs their ability to participate in one of more activities of daily living.   The use of a Rolling Walker will significantly improve the patient's ability to participate in MRADLS and the patient will use it on regular basis in the home.           Jose Mitchell MD  Board-Certified Internal Medicine Attending  Section Head of Hospital Medicine      Goals of Care Treatment Preferences:  Code Status: Full Code    ROS:  General: Negative for fevers or chills.  Cardiac: Negative for chest pain or orthopnea   Pulmonary: Negative for dyspnea or wheezing.  GI: Negative for abdominal distention or pain     Vitals:    12/22/23 1934 12/23/23 0002 12/23/23 0402  12/23/23 0732   BP: 137/78 124/65 118/70 135/68   BP Location:  Left arm Left arm Left arm   Patient Position:  Lying Lying Lying   Pulse: 74 67 69 73   Resp: 18 18 16 18   Temp: 97.6 °F (36.4 °C) 97.8 °F (36.6 °C) 98.7 °F (37.1 °C) 98.1 °F (36.7 °C)   TempSrc: Axillary Axillary Oral Oral   SpO2: 97% 96% 95% 97%   Weight:       Height:              Body mass index is 19.26 kg/m².      PHYSICAL EXAM:  GENERAL APPEARANCE: alert and cooperative, and appears to be in no acute distress.  HEENT:     HEAD: NC/AT     EYES: PERRL, EOMI.  Vision is grossly intact.  NECK: Neck supple, non-tender without LAD, masses or thyromegaly.  CARDIAC: There is no peripheral edema, cyanosis or pallor.   LUNGS: Clear to auscultation and percussion without rales or wheezing  ABDOMEN: Non-distended. No guarding.  MSK: No joint erythema or tenderness.   EXTREMITIES: No significant deformity or joint abnormality. No edema.   NEUROLOGICAL: CN II-XII grossly intact.   SKIN: Skin normal color, texture and turgor with no lesions or eruptions.  PSYCHIATRIC: Oriented. No tangential speech. No Hyperactive features.      Consults:   Consults (From admission, onward)          Status Ordering Provider     Inpatient virtual consult to Hospital Medicine  Once        Provider:  Lor Fernandez MD    Completed INO BALBUENA     Inpatient consult to Telemedicine - Psych  Once        Provider:  Jimena Laurent MD    Completed YIMI JIMENEZ     Inpatient consult to PICC team ([email or web address])  Once        Provider:  (Not yet assigned)    SILVINA Corado A     Inpatient consult to Registered Dietitian/Nutritionist  Once        Provider:  (Not yet assigned)    Completed NILDA BAIRD            No new Assessment & Plan notes have been filed under this hospital service since the last note was generated.  Service: Hospital Medicine    Final Active Diagnoses:    Diagnosis Date Noted POA    PRINCIPAL PROBLEM:  Diabetic ketoacidosis without coma  "[E11.10] 12/10/2023 Yes    Chronic hepatitis C [B18.2] 11/23/2017 Yes     Chronic    Debility [R53.81] 12/19/2023 Yes    Delirium [R41.0] 12/17/2023 Yes    Discharge planning issues [Z02.9] 12/16/2023 Not Applicable    Acute cystitis without hematuria [N30.00] 12/12/2023 Yes    Bipolar disorder [F31.9] 08/30/2023 Yes    Amphetamine use disorder, severe, dependence [F15.20] 09/26/2022 Yes    Type 1 diabetes [E10.9] 05/02/2021 Yes    CAD (coronary artery disease) [I25.10] 08/13/2020 Yes     Chronic    COPD (chronic obstructive pulmonary disease) [J44.9] 08/08/2020 Yes    Opioid abuse [F11.10] 08/05/2018 Yes     Chronic      Problems Resolved During this Admission:       Discharged Condition: stable    Disposition: home HH    Follow Up:    Patient Instructions:      WHEELCHAIR FOR HOME USE     Order Specific Question Answer Comments   Hours in W/C per day: 99    Type of Wheelchair: Standard    Size(Width): 18"(STD adult)    Leg Support: STD footrests    Leg Support: Elevating leg rests    Leg Support: Swing Away    Leg Support: Articulating leg rests    Arm Height: Detachable    Lap Belt: Buckle    Accessories: Anti-tippers    Cushion: Basic    Reclining Back Yes    Headrest: Yes    Height: 5' 10" (1.778 m)    Weight: 60.9 kg (134 lb 4.2 oz)    Does patient have medical equipment at home? cane, straight    Length of need (1-99 months): 3    Please check all that apply: Caregiver is capable and willing to operate wheelchair safely.      Ambulatory referral/consult to Internal Medicine   Standing Status: Future   Referral Priority: Routine Referral Type: Consultation   Referral Reason: Specialty Services Required   Requested Specialty: Internal Medicine   Number of Visits Requested: 1     Ambulatory referral/consult to Psychiatry   Standing Status: Future   Referral Priority: Routine Referral Type: Psychiatric   Referral Reason: Specialty Services Required   Requested Specialty: Psychiatry   Number of Visits Requested: 1 "       Significant Diagnostic Studies:   Recent Results (from the past 100 hour(s))   POCT glucose    Collection Time: 12/19/23  4:51 AM   Result Value Ref Range    POCT Glucose 112 (H) 70 - 110 mg/dL   POCT glucose    Collection Time: 12/19/23  7:31 AM   Result Value Ref Range    POCT Glucose 150 (H) 70 - 110 mg/dL   POCT glucose    Collection Time: 12/19/23 11:21 AM   Result Value Ref Range    POCT Glucose 278 (H) 70 - 110 mg/dL   POCT glucose    Collection Time: 12/19/23  4:20 PM   Result Value Ref Range    POCT Glucose 293 (H) 70 - 110 mg/dL   POCT glucose    Collection Time: 12/19/23  8:14 PM   Result Value Ref Range    POCT Glucose 179 (H) 70 - 110 mg/dL   POCT glucose    Collection Time: 12/20/23  7:35 AM   Result Value Ref Range    POCT Glucose 134 (H) 70 - 110 mg/dL   POCT glucose    Collection Time: 12/20/23 11:08 AM   Result Value Ref Range    POCT Glucose 260 (H) 70 - 110 mg/dL   POCT glucose    Collection Time: 12/20/23  4:18 PM   Result Value Ref Range    POCT Glucose 233 (H) 70 - 110 mg/dL   POCT glucose    Collection Time: 12/20/23  7:42 PM   Result Value Ref Range    POCT Glucose 327 (H) 70 - 110 mg/dL   POCT glucose    Collection Time: 12/21/23  9:11 AM   Result Value Ref Range    POCT Glucose 83 70 - 110 mg/dL   POCT glucose    Collection Time: 12/21/23 12:10 PM   Result Value Ref Range    POCT Glucose 200 (H) 70 - 110 mg/dL   POCT glucose    Collection Time: 12/21/23  4:14 PM   Result Value Ref Range    POCT Glucose 149 (H) 70 - 110 mg/dL   POCT glucose    Collection Time: 12/21/23  7:41 PM   Result Value Ref Range    POCT Glucose 82 70 - 110 mg/dL   POCT glucose    Collection Time: 12/21/23  9:37 PM   Result Value Ref Range    POCT Glucose 195 (H) 70 - 110 mg/dL   POCT glucose    Collection Time: 12/22/23  7:24 AM   Result Value Ref Range    POCT Glucose 199 (H) 70 - 110 mg/dL   POCT glucose    Collection Time: 12/22/23 11:22 AM   Result Value Ref Range    POCT Glucose 245 (H) 70 - 110 mg/dL    POCT glucose    Collection Time: 12/22/23  4:40 PM   Result Value Ref Range    POCT Glucose 88 70 - 110 mg/dL   POCT glucose    Collection Time: 12/22/23  7:28 PM   Result Value Ref Range    POCT Glucose 122 (H) 70 - 110 mg/dL   POCT glucose    Collection Time: 12/23/23  7:31 AM   Result Value Ref Range    POCT Glucose 87 70 - 110 mg/dL       Microbiology Results (last 7 days)       ** No results found for the last 168 hours. **            Imaging Results              X-Ray Chest 1 View (Final result)  Result time 12/11/23 01:11:38      Final result by Khris Anna MD (12/11/23 01:11:38)                   Impression:      Right-sided PICC line noted, distal tip at the expected location of the cavoatrial junction, otherwise stable radiographic appearance.      Electronically signed by: Khris Anna  Date:    12/11/2023  Time:    01:11               Narrative:    EXAMINATION:  XR CHEST 1 VIEW    CLINICAL HISTORY:  PICC placement;    TECHNIQUE:  Single frontal view of the chest was performed.    COMPARISON:  Chest radiograph December 10, 2023    FINDINGS:  Single chest view is submitted.  There is interval placement of a right-sided PICC line, distal tip at the expected location of the cavoatrial junction.    When accounting for position and technique and depth of inspiration, the appearance of the cardiomediastinal silhouette and pulmonary bronchovascular markings is stable.  Mild atelectatic change noted.    The osseous structures appear intact.                                       CT Head Without Contrast (Final result)  Result time 12/10/23 17:05:16      Final result by Chapito Walker MD (12/10/23 17:05:16)                   Impression:      Persistent ventriculomegaly.  Correlate for normal pressure hydrocephalus.    Stable area of encephalomalacia involving the occipital pole on the right.    No acute hemorrhage, mass or infarction.      Electronically signed by: Chapito  Aaron  Date:    12/10/2023  Time:    17:05               Narrative:    EXAMINATION:  CT HEAD WITHOUT CONTRAST    CLINICAL HISTORY:  Mental status change, unknown cause;    TECHNIQUE:  Low dose axial CT images obtained throughout the head without intravenous contrast. Sagittal and coronal reconstructions were performed.    COMPARISON:  None.    FINDINGS:  Intracranial compartment:    Ventricles and sulci are stable in size with prominence of the lateral ventricular system communicating to the 4th ventricle and cisterna magna.  No extra-axial blood or fluid collections.    The brain parenchyma appears stable with low-density in encephalomalacia in the right occipital lobe again noted..  No new parenchymal mass, hemorrhage, edema or major vascular distribution infarct.    Skull/extracranial contents (limited evaluation): No fracture. Mastoid air cells and paranasal sinuses are essentially clear.                                       X-Ray Chest AP Portable (Final result)  Result time 12/10/23 16:42:03      Final result by Dianne Cantu MD (12/10/23 16:42:03)                   Impression:      No acute abnormality.      Electronically signed by: Dianne Cantu MD  Date:    12/10/2023  Time:    16:42               Narrative:    EXAMINATION:  XR CHEST AP PORTABLE    CLINICAL HISTORY:  hyperglycemia;    TECHNIQUE:  Single frontal view of the chest was performed.    COMPARISON:  07/22/2023    FINDINGS:  The lungs are clear with normal appearance of pulmonary vasculature. No pleural effusion. No evident pneumothorax.    The cardiac silhouette is normal in size. The hilar and mediastinal contours are unremarkable.    Bones are intact.                                          Pending Diagnostic Studies:       None           Medications:  Reconciled Home Medications:      Medication List        START taking these medications      folic acid 1 MG tablet  Commonly known as: FOLVITE  Take 1 tablet (1 mg total) by mouth  "once daily.     guaiFENesin 600 mg 12 hr tablet  Commonly known as: MUCINEX  Take 1 tablet (600 mg total) by mouth 2 (two) times daily. for 10 days     losartan 50 MG tablet  Commonly known as: COZAAR  Take 1 tablet (50 mg total) by mouth once daily.     melatonin 3 mg tablet  Commonly known as: MELATIN  Take 2 tablets (6 mg total) by mouth nightly.     multivitamin Tab  Take 1 tablet by mouth once daily.     thiamine 100 MG tablet  Take 1 tablet (100 mg total) by mouth once daily.            CONTINUE taking these medications      albuterol 90 mcg/actuation inhaler  Commonly known as: PROVENTIL/VENTOLIN HFA  Inhale 1-2 puffs into the lungs every 6 (six) hours as needed for Wheezing. Rescue     aspirin 81 MG Chew  Take 1 tablet (81 mg total) by mouth once daily.     atorvastatin 20 MG tablet  Commonly known as: LIPITOR  Take 1 tablet (20 mg total) by mouth once daily.     BD GERTRUDIS 2ND GEN PEN NEEDLE 32 gauge x 5/32" Ndle  Generic drug: pen needle, diabetic  Inject into the skin 4 (four) times daily.     * carBAMazepine 400 MG Tb12  Commonly known as: TEGRETOL XR  Take 1 tablet (400 mg total) by mouth every evening.     * carBAMazepine 200 MG 12 hr tablet  Commonly known as: TEGRETOL XR  Take 1 tablet (200 mg total) by mouth once daily.     cetirizine 10 MG tablet  Commonly known as: ZYRTEC  Take 1 tablet (10 mg total) by mouth once daily.     clopidogreL 75 mg tablet  Commonly known as: PLAVIX  Take 1 tablet (75 mg total) by mouth once daily.     FREESTYLE SEBASTIAN 2 SENSOR Kit  Generic drug: flash glucose sensor  USE FOUR TIMES PER WEEK AS DIRECTED     * insulin detemir U-100 (Levemir) 100 unit/mL (3 mL) Inpn pen  Inject 10 Units into the skin every evening.     * insulin detemir U-100 (Levemir) 100 unit/mL (3 mL) Inpn pen  Inject 17 Units into the skin once daily.     insulin lispro 100 unit/mL pen  Inject into the skin.     levocetirizine 5 MG tablet  Commonly known as: XYZAL  Take 5 mg by mouth every evening.   "   loratadine 10 mg tablet  Commonly known as: CLARITIN  Take 10 mg by mouth.     metoprolol succinate 25 MG 24 hr tablet  Commonly known as: TOPROL-XL  Take 1 tablet (25 mg total) by mouth once daily.     naloxone 4 mg/actuation Spry  Commonly known as: NARCAN  4mg by nasal route as needed for opioid overdose; may repeat every 2-3 minutes in alternating nostrils until medical help arrives. Call 911     ONETOUCH DELICA PLUS LANCET 33 gauge Misc  Generic drug: lancets  Apply topically 4 (four) times daily.     ONETOUCH ULTRA TEST Strp  Generic drug: blood sugar diagnostic  USE TO TEST BLOOD SUGAR FOUR TIMES DAILY     ONETOUCH ULTRA2 METER Misc  Generic drug: blood-glucose meter  4 (four) times daily.     risperiDONE 1 MG Tbdl  Commonly known as: RISPERDAL M-TABS  Take 1 tablet (1 mg total) by mouth 2 (two) times daily.     venlafaxine 75 MG 24 hr capsule  Commonly known as: EFFEXOR-XR  Take 1 capsule (75 mg total) by mouth once daily.           * This list has 4 medication(s) that are the same as other medications prescribed for you. Read the directions carefully, and ask your doctor or other care provider to review them with you.                  Indwelling Lines/Drains at time of discharge:   Lines/Drains/Airways       Peripherally Inserted Central Catheter Line  Duration             PICC Double Lumen 12/11/23 0035 right basilic 12 days              Drain  Duration             Male External Urinary Catheter 12/11/23 0540 12 days                    Time spent on the discharge of patient: 35 minutes         Jose Mitchell MD  Department of Hospital Medicine  [facility]

## 2023-12-23 NOTE — PLAN OF CARE
ADT 30 order placed for Van Transportation.  Requested  time:  If transportation does not arrive at ETA time nurse will be instructed to follow protocol for transportation below:  How can I get in touch directly with dispatch, if needed?                 Non-emergent dispatch: [phone]      +++NURSING:  If Van does not arrive at requested time please call the above Non Emergent Dispatcher.  If issue not resolved please escalate to your charge nurse for further instructions.

## 2023-12-23 NOTE — NURSING
[facility]  Nurses Note -- 4 Eyes      12/23/2023       Skin assessed on: Q Shift      [x] No Pressure Injuries Present    []Prevention Measures Documented    [] Yes LDA  for Pressure Injury Previously documented     [] Yes New Pressure Injury Discovered   [] LDA for New Pressure Injury Added      Attending RN:  Radha Beal, RN     Second RN:  Michelle Leal

## 2023-12-23 NOTE — PLAN OF CARE
[facility]      HOME HEALTH ORDERS  FACE TO FACE ENCOUNTER    Patient Name: Cali Mabry  YOB: 1965    PCP: Lawanda Flores NP   PCP Address: [address] / [place]  PCP Phone Number: [phone]  PCP Fax: [phone]    Encounter Date: 12/10/23    Admit to Home Health    Diagnoses:  Active Hospital Problems    Diagnosis  POA    *Diabetic ketoacidosis without coma [E11.10]  Yes     Priority: 1 - High    Chronic hepatitis C [B18.2]  Yes     Priority: 2      Chronic    Debility [R53.81]  Yes    Delirium [R41.0]  Yes    Discharge planning issues [Z02.9]  Not Applicable    Acute cystitis without hematuria [N30.00]  Yes    Bipolar disorder [F31.9]  Yes    Amphetamine use disorder, severe, dependence [F15.20]  Yes    Type 1 diabetes [E10.9]  Yes    CAD (coronary artery disease) [I25.10]  Yes     Chronic    COPD (chronic obstructive pulmonary disease) [J44.9]  Yes    Opioid abuse [F11.10]  Yes     Chronic      Resolved Hospital Problems   No resolved problems to display.       Follow Up Appointments:  No future appointments.    Allergies:Review of patient's allergies indicates:  No Known Allergies    Medications: Review discharge medications with patient and family and provide education.    Current Facility-Administered Medications   Medication Dose Route Frequency Provider Last Rate Last Admin    acetaminophen tablet 650 mg  650 mg Oral Q4H PRN Karissa Lugo MD   650 mg at 12/17/23 1021    aspirin chewable tablet 81 mg  81 mg Oral Daily Karissa Lugo MD   81 mg at 12/22/23 0918    atorvastatin tablet 20 mg  20 mg Oral Daily Karissa Lugo MD   20 mg at 12/22/23 0918    carBAMazepine 12 hr tablet 200 mg  200 mg Oral Daily Karissa Lugo MD   200 mg at 12/22/23 0918    carBAMazepine 12 hr tablet 400 mg  400 mg Oral QHS Karissa Lugo MD   400 mg at 12/22/23 2018    clopidogreL tablet 75 mg  75 mg Oral Daily Karissa Lugo MD   75 mg at  12/22/23 0918    dextrose 10% bolus 125 mL 125 mL  12.5 g Intravenous PRN Josep Simon MD   Stopped at 12/17/23 2228    dextrose 10% bolus 250 mL 250 mL  25 g Intravenous PRN Josep Simon MD   Stopped at 12/18/23 2355    folic acid tablet 1 mg  1 mg Oral Daily Lor Fernandez MD   1 mg at 12/22/23 0918    glucagon (human recombinant) injection 1 mg  1 mg Intramuscular PRN Josep Simon MD        glucose chewable tablet 16 g  16 g Oral PRN Josep Simon MD        glucose chewable tablet 24 g  24 g Oral PRN Josep Simon MD        guaiFENesin 12 hr tablet 600 mg  600 mg Oral BID FavianYandy, NP   600 mg at 12/22/23 2018    insulin aspart U-100 pen 0-5 Units  0-5 Units Subcutaneous QID (AC + HS) Josep Saeed MD   2 Units at 12/22/23 1132    insulin aspart U-100 pen 8 Units  8 Units Subcutaneous TIDWM Uday Cramer T., DO   8 Units at 12/23/23 0751    insulin detemir U-100 (Levemir) pen 10 Units  10 Units Subcutaneous BID Josep Simon MD   10 Units at 12/22/23 2019    LORazepam injection 1 mg  1 mg Intravenous Q6H PRN Lor Fernandez MD   1 mg at 12/20/23 2119    losartan tablet 50 mg  50 mg Oral Daily Uday Cramer T., DO   50 mg at 12/22/23 0918    melatonin tablet 6 mg  6 mg Oral Nightly Lor Fernandez MD   6 mg at 12/22/23 2018    metoprolol succinate (TOPROL-XL) 24 hr tablet 25 mg  25 mg Oral Daily Karissa Lugo MD   25 mg at 12/22/23 0918    multivitamin tablet  1 tablet Oral Daily Lor Fernandez MD   1 tablet at 12/22/23 0918    ondansetron injection 4 mg  4 mg Intravenous Q6H PRN Karissa Lugo MD        polyethylene glycol packet 17 g  17 g Oral Daily Jimmy Ibarra MD   17 g at 12/22/23 0918    risperiDONE tablet 1 mg  1 mg Oral BID Karissa Lugo MD   1 mg at 12/22/23 2018    senna-docusate 8.6-50 mg per tablet 1 tablet  1 tablet Oral Daily Lor Rock MD        sodium chloride 0.9% flush 10 mL  10 mL Intravenous Q6H Noble Bach MD   10 mL at 12/23/23  "0525    And    sodium chloride 0.9% flush 10 mL  10 mL Intravenous PRN Noble Bach MD        thiamine tablet 100 mg  100 mg Oral Daily Lor Fernandez MD   100 mg at 12/22/23 0918    venlafaxine 24 hr capsule 75 mg  75 mg Oral Daily Karissa Lugo MD   75 mg at 12/22/23 0918     Current Discharge Medication List        START taking these medications    Details   folic acid (FOLVITE) 1 MG tablet Take 1 tablet (1 mg total) by mouth once daily.  Qty: 90 tablet, Refills: 3      guaiFENesin (MUCINEX) 600 mg 12 hr tablet Take 1 tablet (600 mg total) by mouth 2 (two) times daily. for 10 days  Qty: 20 tablet, Refills: 0      losartan (COZAAR) 50 MG tablet Take 1 tablet (50 mg total) by mouth once daily.  Qty: 90 tablet, Refills: 3    Comments: .      melatonin (MELATIN) 3 mg tablet Take 2 tablets (6 mg total) by mouth nightly.  Qty: 30 tablet, Refills: 0      multivitamin Tab Take 1 tablet by mouth once daily.  Qty: 90 tablet, Refills: 0      thiamine 100 MG tablet Take 1 tablet (100 mg total) by mouth once daily.  Qty: 90 tablet, Refills: 0           CONTINUE these medications which have NOT CHANGED    Details   atorvastatin (LIPITOR) 20 MG tablet Take 1 tablet (20 mg total) by mouth once daily.  Qty: 90 tablet, Refills: 3      BD GERTRUDIS 2ND GEN PEN NEEDLE 32 gauge x 5/32" Ndle Inject into the skin 4 (four) times daily.      !! carBAMazepine (TEGRETOL XR) 200 MG 12 hr tablet Take 1 tablet (200 mg total) by mouth once daily.  Qty: 30 tablet, Refills: 3      FREESTYLE SEBASTIAN 2 SENSOR Kit USE FOUR TIMES PER WEEK AS DIRECTED      !! insulin detemir U-100, Levemir, 100 unit/mL (3 mL) SubQ InPn pen Inject 10 Units into the skin every evening.  Qty: 3 mL, Refills: 11      insulin lispro 100 unit/mL pen Inject into the skin.      levocetirizine (XYZAL) 5 MG tablet Take 5 mg by mouth every evening.      risperiDONE (RISPERDAL M-TABS) 1 MG TbDL Take 1 tablet (1 mg total) by mouth 2 (two) times daily.  Qty: 60 tablet, " Refills: 3      albuterol (PROVENTIL/VENTOLIN HFA) 90 mcg/actuation inhaler Inhale 1-2 puffs into the lungs every 6 (six) hours as needed for Wheezing. Rescue  Qty: 18 g, Refills: 0      aspirin 81 MG Chew Take 1 tablet (81 mg total) by mouth once daily.  Qty: 90 tablet, Refills: 3      !! carBAMazepine (TEGRETOL XR) 400 MG Tb12 Take 1 tablet (400 mg total) by mouth every evening.  Qty: 30 tablet, Refills: 3      cetirizine (ZYRTEC) 10 MG tablet Take 1 tablet (10 mg total) by mouth once daily.  Qty: 30 tablet, Refills: 0    Associated Diagnoses: Viral URI with cough      clopidogreL (PLAVIX) 75 mg tablet Take 1 tablet (75 mg total) by mouth once daily.  Qty: 30 tablet, Refills: 0      !! insulin detemir U-100, Levemir, 100 unit/mL (3 mL) SubQ InPn pen Inject 17 Units into the skin once daily.  Qty: 5.1 mL, Refills: 11      loratadine (CLARITIN) 10 mg tablet Take 10 mg by mouth.    Associated Diagnoses: History of hepatitis C      metoprolol succinate (TOPROL-XL) 25 MG 24 hr tablet Take 1 tablet (25 mg total) by mouth once daily.  Qty: 30 tablet, Refills: 11    Comments: .      naloxone (NARCAN) 4 mg/actuation Spry 4mg by nasal route as needed for opioid overdose; may repeat every 2-3 minutes in alternating nostrils until medical help arrives. Call 911  Qty: 1 each, Refills: 11      ONETOUCH DELICA PLUS LANCET 33 gauge Misc Apply topically 4 (four) times daily.      ONETOUCH ULTRA TEST Strp USE TO TEST BLOOD SUGAR FOUR TIMES DAILY      ONETOUCH ULTRA2 METER Misc 4 (four) times daily.      venlafaxine (EFFEXOR-XR) 75 MG 24 hr capsule Take 1 capsule (75 mg total) by mouth once daily.  Qty: 30 capsule, Refills: 3       !! - Potential duplicate medications found. Please discuss with provider.            I have seen and examined this patient within the last 30 days. My clinical findings that support the need for the home health skilled services and home bound status are the following:no   Weakness/numbness causing balance  and gait disturbance due to Infection and Weakness/Debility making it taxing to leave home.  Requiring assistive device to leave home due to unsteady gait caused by  Infection and Weakness/Debility.     Diet:   diabetic diet 2000 calorie  Mechanically soft    Labs:  na    Referrals/ Consults  Physical Therapy to evaluate and treat. Evaluate for home safety and equipment needs; Establish/upgrade home exercise program. Perform / instruct on therapeutic exercises, gait training, transfer training, and Range of Motion.  Occupational Therapy to evaluate and treat. Evaluate home environment for safety and equipment needs. Perform/Instruct on transfers, ADL training, ROM, and therapeutic exercises.   to evaluate for community resources/long-range planning.  Aide to provide assistance with personal care, ADLs, and vital signs.    Activities:   activity as tolerated    Nursing:   Agency to admit patient within 24 hours of hospital discharge unless specified on physician order or at patient request    SN to complete comprehensive assessment including routine vital signs. Instruct on disease process and s/s of complications to report to MD. Review/verify medication list sent home with the patient at time of discharge  and instruct patient/caregiver as needed. Frequency may be adjusted depending on start of care date.     Skilled nurse to perform up to 3 visits PRN for symptoms related to diagnosis    Notify MD if SBP > 160 or < 90; DBP > 90 or < 50; HR > 120 or < 50; Temp > 101; O2 < 88%; Other:      Ok to schedule additional visits based on staff availability and patient request on consecutive days within the home health episode.    When multiple disciplines ordered:    Start of Care occurs on Sunday - Wednesday schedule remaining discipline evaluations as ordered on separate consecutive days following the start of care.    Thursday SOC -schedule subsequent evaluations Friday and Monday the following week.      Friday - Saturday SOC - schedule subsequent discipline evaluations on consecutive days starting Monday of the following week.    For all post-discharge communication, lab results, vitals, and subsequent orders- please contact patient's PCP.  If unable to get ahold of PCP, and question is about blood pressure, diuresis, or arrhythmia attempt to contact cardiologist.  If unable to get ahold of PCP, and question is about dialysis, please attempt to contact nephrologist.  If unable to get ahold of PCP, and question is about antibiotics or wound care, please contact infectious disease doctor.  If unable to get ahold of PCP, and question is about oxygen titration, CPAP or BIPAP, please contact pulmonologist.   If unable to get ahold of PCP or the above physicians in a reasonable time, please contact  on-call for clarification.    Home Health Aide:  Nursing Twice weekly  Physical Therapy Three times weekly  Occupational Therapy Three times weekly  Medical Social Work Weekly  Home Health Aide Twice weekly    Wound Care Orders  no     I certify that this patient is confined to her home and needs intermittent skilled nursing care, physical therapy, and occupational therapy.        Jose Mitchell MD  Internal Medicine Staff

## 2023-12-23 NOTE — PLAN OF CARE
Problem: SLP  Goal: SLP Goal  Description: 1. Pt will tolerate IDDSI level 4 puree diet and thin liquids w/o overt s/s of aspiration. - discontinue goal 12/21/23    2. Pt will tolerate PO diet of mechanical soft consistency with thin liquids without overt s/s of aspiration.- GOAL MET 12/23/23      Outcome: Met   Pt tolerating current diet safely w/o overt s/s of aspiration.

## 2023-12-23 NOTE — PLAN OF CARE
Problem: Adult Inpatient Plan of Care  Goal: Plan of Care Review  Outcome: Met  Goal: Patient-Specific Goal (Individualized)  Outcome: Met  Goal: Absence of Hospital-Acquired Illness or Injury  Outcome: Met  Goal: Optimal Comfort and Wellbeing  Outcome: Met  Goal: Readiness for Transition of Care  Outcome: Met     Problem: Diabetic Ketoacidosis  Goal: Fluid and Electrolyte Balance with Absence of Ketosis  Outcome: Met     Problem: Diabetes Comorbidity  Goal: Blood Glucose Level Within Targeted Range  Outcome: Met     Problem: Infection  Goal: Absence of Infection Signs and Symptoms  Outcome: Met     Problem: Skin Injury Risk Increased  Goal: Skin Health and Integrity  Outcome: Met     Problem: Impaired Wound Healing  Goal: Optimal Wound Healing  Outcome: Met     Problem: Fall Injury Risk  Goal: Absence of Fall and Fall-Related Injury  Outcome: Met     Problem: Thought Process Alteration  Goal: Optimal Thought Clarity  Outcome: Met

## 2023-12-23 NOTE — PLAN OF CARE
All needs are met. LINN sent HH referral to [facility] via [employer]. Patient has been approved for HH services with [facility]. LINN faxed discharge summary and progress notes to [facility]. LINN added post acute resources to AVS. Patient DME will be delivered to his home per Mar[facility] at [facility]. LINN notified nurse Radha that patient is clear for discharge from case management standpoint.     12/23/23 1440   Final Note   Assessment Type Final Discharge Note   Anticipated Discharge Disposition Home   What phone number can be called within the next 1-3 days to see how you are doing after discharge? [phone]   Hospital Resources/Appts/Education Provided Appointments scheduled and added to AVS;Post-Acute resouces added to AVS   Post-Acute Status   Post-Acute Authorization HME   E Status Set-up Complete/Auth obtained   Coverage [employer]   Patient choice form signed by patient/caregiver List with quality metrics by geographic area provided   Discharge Delays None known at this time

## 2023-12-23 NOTE — NURSING
Pt resting in bed asleep, no complaints of pain/discomfort. Scheduled medications given without difficulty. PICC LUANA, saline lock. Tele #[phone]. Condom cath in place. Pt remains free from fall/injury. Vitals assessed. Accu checks w/ insulin coverage. Pt on room air; no distress noted. Tele sitter remains at bedside. Safety measures maintained. Will cont to monitor

## 2023-12-24 NOTE — NURSING
[facility]  Nurses Note -- 4 Eyes      12/24/2023       Skin assessed on: Q Shift      [x] No Pressure Injuries Present    [x]Prevention Measures Documented    [] Yes LDA  for Pressure Injury Previously documented     [] Yes New Pressure Injury Discovered   [] LDA for New Pressure Injury Added      Attending RN:  Marylin Jimenez RN     Second RN:  RITA Garcia

## 2023-12-24 NOTE — NURSING
SPD came to pickup pt to go to home. Pt unable to stand to get into w/c unassisted. Pt is also not able to amb from wc van to his house. House supervisor notified. Pt up graded to a stretcher and transfer center notified at [phone].

## 2024-03-04 ENCOUNTER — HOSPITAL ENCOUNTER (INPATIENT)
Facility: HOSPITAL | Age: 59
LOS: 15 days | Discharge: HOME HEALTH | DRG: 871 | End: 2024-03-19
Attending: STUDENT IN AN ORGANIZED HEALTH CARE EDUCATION/TRAINING PROGRAM | Admitting: HOSPITALIST
Payer: MEDICARE

## 2024-03-04 DIAGNOSIS — R07.9 CHEST PAIN: ICD-10-CM

## 2024-03-04 DIAGNOSIS — R41.82 ALTERED MENTAL STATUS, UNSPECIFIED ALTERED MENTAL STATUS TYPE: ICD-10-CM

## 2024-03-04 DIAGNOSIS — R00.0 TACHYCARDIA: ICD-10-CM

## 2024-03-04 DIAGNOSIS — G93.40 ACUTE ENCEPHALOPATHY: ICD-10-CM

## 2024-03-04 DIAGNOSIS — K59.00 CONSTIPATION, UNSPECIFIED CONSTIPATION TYPE: ICD-10-CM

## 2024-03-04 DIAGNOSIS — U07.1 COVID: ICD-10-CM

## 2024-03-04 DIAGNOSIS — R94.01 ABNORMAL EEG: ICD-10-CM

## 2024-03-04 DIAGNOSIS — R53.81 DEBILITY: ICD-10-CM

## 2024-03-04 DIAGNOSIS — N39.0 URINARY TRACT INFECTION WITHOUT HEMATURIA, SITE UNSPECIFIED: ICD-10-CM

## 2024-03-04 DIAGNOSIS — R53.1 WEAKNESS: ICD-10-CM

## 2024-03-04 DIAGNOSIS — G93.41 METABOLIC ENCEPHALOPATHY: ICD-10-CM

## 2024-03-04 PROBLEM — F31.9 BIPOLAR DISORDER: Chronic | Status: ACTIVE | Noted: 2023-08-30

## 2024-03-04 PROBLEM — R76.8 HCV ANTIBODY POSITIVE: Chronic | Status: ACTIVE | Noted: 2024-03-04

## 2024-03-04 PROBLEM — R76.8 HCV ANTIBODY POSITIVE: Status: ACTIVE | Noted: 2024-03-04

## 2024-03-04 PROBLEM — E10.9 TYPE 1 DIABETES: Chronic | Status: ACTIVE | Noted: 2021-05-02

## 2024-03-04 LAB
ALBUMIN SERPL BCP-MCNC: 3.4 G/DL (ref 3.5–5.2)
ALLENS TEST: ABNORMAL
ALLENS TEST: NORMAL
ALP SERPL-CCNC: 114 U/L (ref 55–135)
ALT SERPL W/O P-5'-P-CCNC: 17 U/L (ref 10–44)
AMPHET+METHAMPHET UR QL: NEGATIVE
ANION GAP SERPL CALC-SCNC: 10 MMOL/L (ref 8–16)
AST SERPL-CCNC: 13 U/L (ref 10–40)
B-OH-BUTYR BLD STRIP-SCNC: 0.1 MMOL/L (ref 0–0.5)
BACTERIA #/AREA URNS AUTO: ABNORMAL /HPF
BARBITURATES UR QL SCN>200 NG/ML: NEGATIVE
BASOPHILS # BLD AUTO: 0.03 K/UL (ref 0–0.2)
BASOPHILS NFR BLD: 0.4 % (ref 0–1.9)
BENZODIAZ UR QL SCN>200 NG/ML: NEGATIVE
BILIRUB SERPL-MCNC: 0.4 MG/DL (ref 0.1–1)
BILIRUB UR QL STRIP: NEGATIVE
BNP SERPL-MCNC: 41 PG/ML (ref 0–99)
BUN SERPL-MCNC: 17 MG/DL (ref 6–20)
BZE UR QL SCN: NEGATIVE
CALCIUM SERPL-MCNC: 9.4 MG/DL (ref 8.7–10.5)
CANNABINOIDS UR QL SCN: NEGATIVE
CARBAMAZEPINE SERPL-MCNC: <1.9 UG/ML (ref 4–12)
CHLORIDE SERPL-SCNC: 102 MMOL/L (ref 95–110)
CK SERPL-CCNC: 257 U/L (ref 20–200)
CLARITY UR REFRACT.AUTO: CLEAR
CO2 SERPL-SCNC: 25 MMOL/L (ref 23–29)
COLOR UR AUTO: COLORLESS
CREAT SERPL-MCNC: 1.1 MG/DL (ref 0.5–1.4)
CREAT UR-MCNC: 38 MG/DL (ref 23–375)
DIFFERENTIAL METHOD BLD: ABNORMAL
EOSINOPHIL # BLD AUTO: 0.1 K/UL (ref 0–0.5)
EOSINOPHIL NFR BLD: 1.9 % (ref 0–8)
ERYTHROCYTE [DISTWIDTH] IN BLOOD BY AUTOMATED COUNT: 14.4 % (ref 11.5–14.5)
EST. GFR  (NO RACE VARIABLE): >60 ML/MIN/1.73 M^2
FENTANYL UR QL SCN: NEGATIVE
GLUCOSE SERPL-MCNC: 192 MG/DL (ref 70–110)
GLUCOSE UR QL STRIP: ABNORMAL
HCT VFR BLD AUTO: 39.6 % (ref 40–54)
HGB BLD-MCNC: 12.5 G/DL (ref 14–18)
HGB UR QL STRIP: ABNORMAL
HIV 1+2 AB+HIV1 P24 AG SERPL QL IA: NORMAL
HYALINE CASTS UR QL AUTO: 0 /LPF
IMM GRANULOCYTES # BLD AUTO: 0.02 K/UL (ref 0–0.04)
IMM GRANULOCYTES NFR BLD AUTO: 0.3 % (ref 0–0.5)
INFLUENZA A, MOLECULAR: NEGATIVE
INFLUENZA B, MOLECULAR: NEGATIVE
KETONES UR QL STRIP: ABNORMAL
LDH SERPL L TO P-CCNC: 1.03 MMOL/L (ref 0.5–2.2)
LDH SERPL L TO P-CCNC: 2.31 MMOL/L (ref 0.5–2.2)
LEUKOCYTE ESTERASE UR QL STRIP: NEGATIVE
LIPASE SERPL-CCNC: 34 U/L (ref 4–60)
LYMPHOCYTES # BLD AUTO: 0.8 K/UL (ref 1–4.8)
LYMPHOCYTES NFR BLD: 11 % (ref 18–48)
MCH RBC QN AUTO: 25.6 PG (ref 27–31)
MCHC RBC AUTO-ENTMCNC: 31.6 G/DL (ref 32–36)
MCV RBC AUTO: 81 FL (ref 82–98)
METHADONE UR QL SCN>300 NG/ML: NEGATIVE
MICROSCOPIC COMMENT: ABNORMAL
MONOCYTES # BLD AUTO: 1.1 K/UL (ref 0.3–1)
MONOCYTES NFR BLD: 14.9 % (ref 4–15)
NEUTROPHILS # BLD AUTO: 5.2 K/UL (ref 1.8–7.7)
NEUTROPHILS NFR BLD: 71.5 % (ref 38–73)
NITRITE UR QL STRIP: POSITIVE
NRBC BLD-RTO: 0 /100 WBC
OHS QRS DURATION: 84 MS
OHS QRS DURATION: 92 MS
OHS QTC CALCULATION: 458 MS
OHS QTC CALCULATION: 461 MS
OPIATES UR QL SCN: NEGATIVE
OXYCODONE UR QL SCN: NEGATIVE
PCP UR QL SCN>25 NG/ML: NEGATIVE
PH UR STRIP: 6 [PH] (ref 5–8)
PLATELET # BLD AUTO: 257 K/UL (ref 150–450)
PMV BLD AUTO: 10 FL (ref 9.2–12.9)
POCT GLUCOSE: 346 MG/DL (ref 70–110)
POTASSIUM SERPL-SCNC: 4.1 MMOL/L (ref 3.5–5.1)
PROCALCITONIN SERPL IA-MCNC: 0.04 NG/ML
PROT SERPL-MCNC: 7.8 G/DL (ref 6–8.4)
PROT UR QL STRIP: ABNORMAL
RBC # BLD AUTO: 4.88 M/UL (ref 4.6–6.2)
RBC #/AREA URNS AUTO: 10 /HPF (ref 0–4)
SAMPLE: ABNORMAL
SAMPLE: NORMAL
SARS-COV-2 RDRP RESP QL NAA+PROBE: POSITIVE
SITE: ABNORMAL
SITE: NORMAL
SODIUM SERPL-SCNC: 137 MMOL/L (ref 136–145)
SP GR UR STRIP: 1.02 (ref 1–1.03)
SPECIMEN SOURCE: NORMAL
SQUAMOUS #/AREA URNS AUTO: 0 /HPF
TOXICOLOGY INFORMATION: NORMAL
TRAMADOL UR QL SCN: NEGATIVE
TROPONIN I SERPL DL<=0.01 NG/ML-MCNC: 0.01 NG/ML (ref 0–0.03)
URN SPEC COLLECT METH UR: ABNORMAL
WBC # BLD AUTO: 7.24 K/UL (ref 3.9–12.7)
WBC #/AREA URNS AUTO: 3 /HPF (ref 0–5)
YEAST UR QL AUTO: ABNORMAL

## 2024-03-04 PROCEDURE — 99900035 HC TECH TIME PER 15 MIN (STAT)

## 2024-03-04 PROCEDURE — XW033E5 INTRODUCTION OF REMDESIVIR ANTI-INFECTIVE INTO PERIPHERAL VEIN, PERCUTANEOUS APPROACH, NEW TECHNOLOGY GROUP 5: ICD-10-PCS | Performed by: HOSPITALIST

## 2024-03-04 PROCEDURE — 93010 ELECTROCARDIOGRAM REPORT: CPT | Mod: 76,,, | Performed by: INTERNAL MEDICINE

## 2024-03-04 PROCEDURE — 96365 THER/PROPH/DIAG IV INF INIT: CPT

## 2024-03-04 PROCEDURE — 85025 COMPLETE CBC W/AUTO DIFF WBC: CPT | Performed by: EMERGENCY MEDICINE

## 2024-03-04 PROCEDURE — 25000003 PHARM REV CODE 250: Performed by: EMERGENCY MEDICINE

## 2024-03-04 PROCEDURE — 87040 BLOOD CULTURE FOR BACTERIA: CPT | Performed by: EMERGENCY MEDICINE

## 2024-03-04 PROCEDURE — 93010 ELECTROCARDIOGRAM REPORT: CPT | Mod: ,,, | Performed by: INTERNAL MEDICINE

## 2024-03-04 PROCEDURE — 80373 DRUG SCREENING TRAMADOL: CPT | Performed by: STUDENT IN AN ORGANIZED HEALTH CARE EDUCATION/TRAINING PROGRAM

## 2024-03-04 PROCEDURE — 63600175 PHARM REV CODE 636 W HCPCS: Performed by: STUDENT IN AN ORGANIZED HEALTH CARE EDUCATION/TRAINING PROGRAM

## 2024-03-04 PROCEDURE — 84145 PROCALCITONIN (PCT): CPT | Performed by: EMERGENCY MEDICINE

## 2024-03-04 PROCEDURE — 83605 ASSAY OF LACTIC ACID: CPT

## 2024-03-04 PROCEDURE — 99285 EMERGENCY DEPT VISIT HI MDM: CPT | Mod: 25

## 2024-03-04 PROCEDURE — 21400001 HC TELEMETRY ROOM

## 2024-03-04 PROCEDURE — 87502 INFLUENZA DNA AMP PROBE: CPT | Performed by: EMERGENCY MEDICINE

## 2024-03-04 PROCEDURE — 63600175 PHARM REV CODE 636 W HCPCS: Performed by: EMERGENCY MEDICINE

## 2024-03-04 PROCEDURE — U0002 COVID-19 LAB TEST NON-CDC: HCPCS | Performed by: EMERGENCY MEDICINE

## 2024-03-04 PROCEDURE — 83690 ASSAY OF LIPASE: CPT | Performed by: EMERGENCY MEDICINE

## 2024-03-04 PROCEDURE — 80156 ASSAY CARBAMAZEPINE TOTAL: CPT | Performed by: EMERGENCY MEDICINE

## 2024-03-04 PROCEDURE — 63600175 PHARM REV CODE 636 W HCPCS: Mod: JZ,TB

## 2024-03-04 PROCEDURE — 83880 ASSAY OF NATRIURETIC PEPTIDE: CPT | Performed by: EMERGENCY MEDICINE

## 2024-03-04 PROCEDURE — 27000207 HC ISOLATION

## 2024-03-04 PROCEDURE — 25500020 PHARM REV CODE 255: Performed by: STUDENT IN AN ORGANIZED HEALTH CARE EDUCATION/TRAINING PROGRAM

## 2024-03-04 PROCEDURE — 80307 DRUG TEST PRSMV CHEM ANLYZR: CPT | Performed by: PHYSICIAN ASSISTANT

## 2024-03-04 PROCEDURE — 96376 TX/PRO/DX INJ SAME DRUG ADON: CPT

## 2024-03-04 PROCEDURE — 25000003 PHARM REV CODE 250

## 2024-03-04 PROCEDURE — 25000003 PHARM REV CODE 250: Performed by: PHYSICIAN ASSISTANT

## 2024-03-04 PROCEDURE — 93005 ELECTROCARDIOGRAM TRACING: CPT

## 2024-03-04 PROCEDURE — 82010 KETONE BODYS QUAN: CPT

## 2024-03-04 PROCEDURE — 63600175 PHARM REV CODE 636 W HCPCS: Performed by: PHYSICIAN ASSISTANT

## 2024-03-04 PROCEDURE — 84484 ASSAY OF TROPONIN QUANT: CPT | Performed by: EMERGENCY MEDICINE

## 2024-03-04 PROCEDURE — 80365 DRUG SCREENING OXYCODONE: CPT | Performed by: STUDENT IN AN ORGANIZED HEALTH CARE EDUCATION/TRAINING PROGRAM

## 2024-03-04 PROCEDURE — 80354 DRUG SCREENING FENTANYL: CPT | Performed by: STUDENT IN AN ORGANIZED HEALTH CARE EDUCATION/TRAINING PROGRAM

## 2024-03-04 PROCEDURE — 82550 ASSAY OF CK (CPK): CPT | Performed by: PHYSICIAN ASSISTANT

## 2024-03-04 PROCEDURE — 87389 HIV-1 AG W/HIV-1&-2 AB AG IA: CPT | Performed by: STUDENT IN AN ORGANIZED HEALTH CARE EDUCATION/TRAINING PROGRAM

## 2024-03-04 PROCEDURE — 80053 COMPREHEN METABOLIC PANEL: CPT | Performed by: EMERGENCY MEDICINE

## 2024-03-04 PROCEDURE — 81001 URINALYSIS AUTO W/SCOPE: CPT | Mod: XB | Performed by: EMERGENCY MEDICINE

## 2024-03-04 PROCEDURE — 96375 TX/PRO/DX INJ NEW DRUG ADDON: CPT

## 2024-03-04 PROCEDURE — 11000001 HC ACUTE MED/SURG PRIVATE ROOM

## 2024-03-04 PROCEDURE — 96367 TX/PROPH/DG ADDL SEQ IV INF: CPT

## 2024-03-04 RX ORDER — IBUPROFEN 200 MG
24 TABLET ORAL
Status: DISCONTINUED | OUTPATIENT
Start: 2024-03-04 | End: 2024-03-19 | Stop reason: HOSPADM

## 2024-03-04 RX ORDER — ENOXAPARIN SODIUM 100 MG/ML
40 INJECTION SUBCUTANEOUS EVERY 24 HOURS
Status: DISCONTINUED | OUTPATIENT
Start: 2024-03-04 | End: 2024-03-19 | Stop reason: HOSPADM

## 2024-03-04 RX ORDER — AMOXICILLIN 250 MG
1 CAPSULE ORAL 2 TIMES DAILY
Status: DISCONTINUED | OUTPATIENT
Start: 2024-03-04 | End: 2024-03-13

## 2024-03-04 RX ORDER — RISPERIDONE 1 MG/1
1 TABLET, ORALLY DISINTEGRATING ORAL 2 TIMES DAILY
Status: DISCONTINUED | OUTPATIENT
Start: 2024-03-04 | End: 2024-03-08

## 2024-03-04 RX ORDER — INSULIN ASPART 100 [IU]/ML
2 INJECTION, SOLUTION INTRAVENOUS; SUBCUTANEOUS
Status: DISCONTINUED | OUTPATIENT
Start: 2024-03-05 | End: 2024-03-05

## 2024-03-04 RX ORDER — ALBUTEROL SULFATE 90 UG/1
2 AEROSOL, METERED RESPIRATORY (INHALATION) EVERY 6 HOURS PRN
Status: DISCONTINUED | OUTPATIENT
Start: 2024-03-04 | End: 2024-03-19 | Stop reason: HOSPADM

## 2024-03-04 RX ORDER — ATORVASTATIN CALCIUM 20 MG/1
20 TABLET, FILM COATED ORAL DAILY
Status: DISCONTINUED | OUTPATIENT
Start: 2024-03-05 | End: 2024-03-19 | Stop reason: HOSPADM

## 2024-03-04 RX ORDER — ACETAMINOPHEN 650 MG/1
650 SUPPOSITORY RECTAL
Status: COMPLETED | OUTPATIENT
Start: 2024-03-04 | End: 2024-03-04

## 2024-03-04 RX ORDER — OLANZAPINE 10 MG/2ML
2.5 INJECTION, POWDER, FOR SOLUTION INTRAMUSCULAR ONCE AS NEEDED
Status: DISCONTINUED | OUTPATIENT
Start: 2024-03-04 | End: 2024-03-05

## 2024-03-04 RX ORDER — FOLIC ACID 1 MG/1
1 TABLET ORAL DAILY
Status: DISCONTINUED | OUTPATIENT
Start: 2024-03-05 | End: 2024-03-19 | Stop reason: HOSPADM

## 2024-03-04 RX ORDER — THIAMINE HCL 100 MG
100 TABLET ORAL DAILY
Status: DISCONTINUED | OUTPATIENT
Start: 2024-03-05 | End: 2024-03-19 | Stop reason: HOSPADM

## 2024-03-04 RX ORDER — LORAZEPAM 2 MG/ML
1 INJECTION INTRAMUSCULAR
Status: COMPLETED | OUTPATIENT
Start: 2024-03-04 | End: 2024-03-04

## 2024-03-04 RX ORDER — CARBAMAZEPINE 200 MG/1
400 TABLET, EXTENDED RELEASE ORAL DAILY
Status: DISCONTINUED | OUTPATIENT
Start: 2024-03-05 | End: 2024-03-08

## 2024-03-04 RX ORDER — GUAIFENESIN AND DEXTROMETHORPHAN HYDROBROMIDE 10; 100 MG/5ML; MG/5ML
5 SYRUP ORAL EVERY 4 HOURS PRN
Status: DISCONTINUED | OUTPATIENT
Start: 2024-03-04 | End: 2024-03-19 | Stop reason: HOSPADM

## 2024-03-04 RX ORDER — NAPROXEN SODIUM 220 MG/1
81 TABLET, FILM COATED ORAL DAILY
Status: DISCONTINUED | OUTPATIENT
Start: 2024-03-05 | End: 2024-03-19 | Stop reason: HOSPADM

## 2024-03-04 RX ORDER — VENLAFAXINE HYDROCHLORIDE 75 MG/1
75 CAPSULE, EXTENDED RELEASE ORAL DAILY
Status: DISCONTINUED | OUTPATIENT
Start: 2024-03-05 | End: 2024-03-19 | Stop reason: HOSPADM

## 2024-03-04 RX ORDER — TALC
6 POWDER (GRAM) TOPICAL NIGHTLY
Status: DISCONTINUED | OUTPATIENT
Start: 2024-03-04 | End: 2024-03-19 | Stop reason: HOSPADM

## 2024-03-04 RX ORDER — BENZONATATE 100 MG/1
100 CAPSULE ORAL 3 TIMES DAILY PRN
Status: DISCONTINUED | OUTPATIENT
Start: 2024-03-04 | End: 2024-03-19 | Stop reason: HOSPADM

## 2024-03-04 RX ORDER — LOSARTAN POTASSIUM 50 MG/1
50 TABLET ORAL DAILY
Status: DISCONTINUED | OUTPATIENT
Start: 2024-03-05 | End: 2024-03-12

## 2024-03-04 RX ORDER — NALOXONE HCL 0.4 MG/ML
0.02 VIAL (ML) INJECTION
Status: DISCONTINUED | OUTPATIENT
Start: 2024-03-04 | End: 2024-03-19 | Stop reason: HOSPADM

## 2024-03-04 RX ORDER — IBUPROFEN 200 MG
16 TABLET ORAL
Status: DISCONTINUED | OUTPATIENT
Start: 2024-03-04 | End: 2024-03-19 | Stop reason: HOSPADM

## 2024-03-04 RX ORDER — GLUCAGON 1 MG
1 KIT INJECTION
Status: DISCONTINUED | OUTPATIENT
Start: 2024-03-04 | End: 2024-03-19 | Stop reason: HOSPADM

## 2024-03-04 RX ORDER — SODIUM CHLORIDE 0.9 % (FLUSH) 0.9 %
10 SYRINGE (ML) INJECTION EVERY 12 HOURS PRN
Status: DISCONTINUED | OUTPATIENT
Start: 2024-03-04 | End: 2024-03-19 | Stop reason: HOSPADM

## 2024-03-04 RX ORDER — ONDANSETRON 8 MG/1
8 TABLET, ORALLY DISINTEGRATING ORAL EVERY 8 HOURS PRN
Status: DISCONTINUED | OUTPATIENT
Start: 2024-03-04 | End: 2024-03-19 | Stop reason: HOSPADM

## 2024-03-04 RX ADMIN — Medication 6 MG: at 08:03

## 2024-03-04 RX ADMIN — INSULIN DETEMIR 10 UNITS: 100 INJECTION, SOLUTION SUBCUTANEOUS at 08:03

## 2024-03-04 RX ADMIN — CEFTRIAXONE 1 G: 1 INJECTION, POWDER, FOR SOLUTION INTRAMUSCULAR; INTRAVENOUS at 08:03

## 2024-03-04 RX ADMIN — SENNOSIDES AND DOCUSATE SODIUM 1 TABLET: 8.6; 5 TABLET ORAL at 08:03

## 2024-03-04 RX ADMIN — PIPERACILLIN SODIUM,TAZOBACTAM SODIUM 3.38 G: 3; .375 INJECTION, POWDER, FOR SOLUTION INTRAVENOUS at 01:03

## 2024-03-04 RX ADMIN — SODIUM CHLORIDE, POTASSIUM CHLORIDE, SODIUM LACTATE AND CALCIUM CHLORIDE 1000 ML: 600; 310; 30; 20 INJECTION, SOLUTION INTRAVENOUS at 01:03

## 2024-03-04 RX ADMIN — REMDESIVIR 200 MG: 100 INJECTION, POWDER, LYOPHILIZED, FOR SOLUTION INTRAVENOUS at 08:03

## 2024-03-04 RX ADMIN — LORAZEPAM 1 MG: 2 INJECTION INTRAMUSCULAR; INTRAVENOUS at 06:03

## 2024-03-04 RX ADMIN — VANCOMYCIN HYDROCHLORIDE 1250 MG: 1.25 INJECTION, POWDER, LYOPHILIZED, FOR SOLUTION INTRAVENOUS at 02:03

## 2024-03-04 RX ADMIN — IOHEXOL 75 ML: 350 INJECTION, SOLUTION INTRAVENOUS at 04:03

## 2024-03-04 RX ADMIN — LORAZEPAM 1 MG: 2 INJECTION INTRAMUSCULAR; INTRAVENOUS at 05:03

## 2024-03-04 RX ADMIN — LORAZEPAM 1 MG: 2 INJECTION INTRAMUSCULAR; INTRAVENOUS at 04:03

## 2024-03-04 RX ADMIN — ACETAMINOPHEN 650 MG: 650 SUPPOSITORY RECTAL at 02:03

## 2024-03-04 RX ADMIN — ENOXAPARIN SODIUM 40 MG: 40 INJECTION SUBCUTANEOUS at 08:03

## 2024-03-04 RX ADMIN — RISPERIDONE 1 MG: 1 TABLET, ORALLY DISINTEGRATING ORAL at 08:03

## 2024-03-04 NOTE — ED PROVIDER NOTES
Encounter Date: 3/4/2024       History     Chief Complaint   Patient presents with    Weakness     + pt. Presents from EMS for weakness unknown for how long it has been happening, patient AAOx1, also experiencing some expressive aphasia, hx of stroke (right sided deficits noted)     The history is provided by the patient and medical records. No  was used.     Cali Mabry is a 58 y.o. male with medical history of chronic pancreatitis, TBI, Diastolic HF, CHF, DM type 1, HCV, HTN, psychiatric care, IVDU presenting to the ED with the chief complaint of weakness.     Arrives via EMS from home. History obtained from patient and his mother ([phone]). Patient is a difficult historian and provides limited history. Able to tell me his name and that he has had a stroke before. He additionally states he shot up meth this morning. Able to tell me he is in the hospital. He otherwise does not respond when asked further questions. His mother reports she called 911 as he was not talkative and would not eat today. She states patient would not get out of bed. Mother first noticed a difference yesterday. Patient uses a wheelchair at his baseline. No reported falls.     Discharged on 12/23/23 for DKA and UTI. UCx grew Proteus mirabilis.     Review of patient's allergies indicates:  No Known Allergies  Past Medical History:   Diagnosis Date    Acute on chronic pancreatitis 04/03/2016    Addiction to drug     CHF (congestive heart failure)     DKA (diabetic ketoacidoses) 02/2020    DM (diabetes mellitus), type 1     HCV (hepatitis C virus)     high VL     HTN (hypertension)     Hx of psychiatric care     Hypomagnesemia 05/25/2020    IVDU (intravenous drug user)     Heroin    Pancreatitis     Psychiatric problem     Stroke     Substance abuse      Past Surgical History:   Procedure Laterality Date    CARDIAC SURGERY  1999    stent placed. ([facility])    ESOPHAGOGASTRODUODENOSCOPY N/A 3/5/2020     Gastritis.  No H pylori.  Completed PPI for 1 month.  Procedure: EGD (ESOPHAGOGASTRODUODENOSCOPY);  Surgeon: Brinda Rene MD;  Location: [facility] ENDO;  Service: Endoscopy;  Laterality: N/A;  W421 A; [phone]    LEFT HEART CATHETERIZATION Left 9/28/2020    Procedure: Left heart cath;  Surgeon: Fito Rangel MD;  Location: [facility] CATH LAB;  Service: Cardiology;  Laterality: Left;    SHOULDER SURGERY  2013    right shoulder, spur removal.     Family History   Problem Relation Age of Onset    Asthma Mother      Social History     Tobacco Use    Smoking status: Former     Current packs/day: 0.00     Average packs/day: 1 pack/day for 39.2 years (39.2 ttl pk-yrs)     Types: Cigarettes     Start date: 4/3/1981     Quit date: 6/1/2020     Years since quitting: 3.7    Smokeless tobacco: Never    Tobacco comments:     states approx 1 per day.   Substance Use Topics    Alcohol use: Not Currently     Alcohol/week: 0.0 standard drinks of alcohol    Drug use: Yes     Types: Heroin, IV, Marijuana, Amphetamines     Comment: chronic methamphetamine user     Review of Systems   Unable to perform ROS: Mental status change   Constitutional:  Positive for fever.       Physical Exam     Initial Vitals [03/04/24 1121]   BP Pulse Resp Temp SpO2   (!) 164/94 (!) 113 20 (!) 100.4 °F (38 °C) 97 %      MAP       --         Physical Exam    Constitutional: He appears well-developed and well-nourished. He is not diaphoretic. He is easily aroused.   Cachetic appearance   HENT:   Head: Normocephalic and atraumatic.   Mouth/Throat: Oropharynx is clear and moist. No oropharyngeal exudate.   Eyes: EOM and lids are normal. Pupils are equal, round, and reactive to light. No scleral icterus.   Neck: Phonation normal. Neck supple. No stridor present.   Normal range of motion.  Cardiovascular:  Regular rhythm.   Tachycardia present.         Pulmonary/Chest: Breath sounds normal. No stridor. No respiratory distress. He has no wheezes. He has no rales.    Abdominal: Abdomen is soft. He exhibits distension. There is no abdominal tenderness. There is no rebound.   Musculoskeletal:         General: No tenderness or edema. Normal range of motion.      Cervical back: Normal range of motion and neck supple.     Neurological: He is easily aroused.   Oriented to person and place. Not participatory with questioning. Weak  BL. Rigidity in upper extremities. Limited strength assessment as he is uncooperative.    Skin: Skin is warm and dry. No rash noted. No erythema.   Psychiatric: He has a normal mood and affect. His speech is normal.       ED Course   Procedures  Labs Reviewed   CBC W/ AUTO DIFFERENTIAL - Abnormal; Notable for the following components:       Result Value    Hemoglobin 12.5 (*)     Hematocrit 39.6 (*)     MCV 81 (*)     MCH 25.6 (*)     MCHC 31.6 (*)     Lymph # 0.8 (*)     Mono # 1.1 (*)     Lymph % 11.0 (*)     All other components within normal limits   COMPREHENSIVE METABOLIC PANEL - Abnormal; Notable for the following components:    Glucose 192 (*)     Albumin 3.4 (*)     All other components within normal limits   URINALYSIS, REFLEX TO URINE CULTURE - Abnormal; Notable for the following components:    Color, UA Colorless (*)     Protein, UA 1+ (*)     Glucose, UA 4+ (*)     Ketones, UA 1+ (*)     Occult Blood UA 1+ (*)     Nitrite, UA Positive (*)     All other components within normal limits    Narrative:     Specimen Source->Urine   SARS-COV-2 RNA AMPLIFICATION, QUAL - Abnormal; Notable for the following components:    SARS-CoV-2 RNA, Amplification, Qual Positive (*)     All other components within normal limits   CK - Abnormal; Notable for the following components:     (*)     All other components within normal limits   CARBAMAZEPINE LEVEL, TOTAL - Abnormal; Notable for the following components:    Carbamazepine Lvl <1.9 (*)     All other components within normal limits    Narrative:     ADD-ON # [id number] PerKevin AWAD DO    03/04/2024  15:51    URINALYSIS MICROSCOPIC - Abnormal; Notable for the following components:    RBC, UA 10 (*)     All other components within normal limits    Narrative:     Specimen Source->Urine   ISTAT LACTATE - Abnormal; Notable for the following components:    POC Lactate 2.31 (*)     All other components within normal limits   POCT GLUCOSE - Abnormal; Notable for the following components:    POCT Glucose 346 (*)     All other components within normal limits   INFLUENZA A & B BY MOLECULAR   CULTURE, BLOOD   CULTURE, BLOOD   CULTURE, RESPIRATORY   B-TYPE NATRIURETIC PEPTIDE   LIPASE   TROPONIN I   PROCALCITONIN   DRUG SCREEN PANEL, URINE EMERGENCY    Narrative:     Specimen Source->Urine   FENTANYL, URINE    Narrative:     Specimen Source->Urine   URINE OXYCODONE    Narrative:     Specimen Source->Urine   TRAMADOL, URINE    Narrative:     Specimen Source->Urine   CARBAMAZEPINE LEVEL, TOTAL   ACETAMINOPHEN LEVEL   HIV 1 / 2 ANTIBODY   BETA - HYDROXYBUTYRATE, SERUM   POCT GLUCOSE, HAND-HELD DEVICE   POCT GLUCOSE MONITORING CONTINUOUS   ISTAT CHEM8        ECG Results              Repeat EKG 12-lead (Final result)        Collection Time Result Time QRS Duration [facility] QTC Calculation    03/04/24 13:02:21 03/04/24 14:32:12 92 [id number]                     Final result by Interface, Lab In [facility] (03/04/24 14:32:20)                   Narrative:    Test Reason : R00.0,    Vent. Rate : 107 BPM     Atrial Rate : 107 BPM     P-R Int : 150 ms          QRS Dur : 092 ms      QT Int : 346 ms       P-R-T Axes : 073 048 082 degrees     QTc Int : 461 ms    Sinus tachycardia  Otherwise normal ECG  When compared with ECG of 04-MAR-2024 11:31,  Nonspecific T wave abnormality no longer evident in Inferior leads  Confirmed by Boby BAZAN MD ([id number]) on 3/4/2024 2:32:09 PM    Referred By: [email or web address]   SELF           Confirmed By:Boby BAZAN MD                                     EKG 12-lead (Final result)        Collection Time Result Time QRS  Duration OHS QTC Calculation    03/04/24 11:31:11 03/04/24 15:10:45 84 458                     Final result by Interface, Lab In [facility] (03/04/24 15:10:56)                   Narrative:    Test Reason : R53.1,    Vent. Rate : 113 BPM     Atrial Rate : 113 BPM     P-R Int : 146 ms          QRS Dur : 084 ms      QT Int : 334 ms       P-R-T Axes : 065 027 037 degrees     QTc Int : 458 ms    Sinus tachycardia  Otherwise normal ECG  When compared with ECG of 10-DEC-2023 16:20,  Nonspecific T wave abnormality now evident in Inferior leads  Confirmed by Boby BAZAN MD ([id number]) on 3/4/2024 3:10:44 PM    Referred By: [email or web address]   SELF           Confirmed By:Boby BAZAN MD                                  Imaging Results              CT Head Without Contrast (Final result)  Result time 03/04/24 16:55:45      Final result by Jered Cheatham MD (03/04/24 16:55:45)                   Impression:      No evidence of acute hemorrhage or major vascular distribution infarct.    Chronic small vessel ischemic change with remote right occipital and left cerebellar infarcts.    Ventriculomegaly out of proportion to the degree of sulcal enlargement, similar to prior exams.  While this could relate to cerebral volume loss, the configuration does raise the possibility of normal pressure hydrocephalus. Clinical correlation required.      Electronically signed by: Jered Cheatham MD  Date:    03/04/2024  Time:    16:55               Narrative:    EXAMINATION:  CT HEAD WITHOUT CONTRAST    CLINICAL HISTORY:  Mental status change, unknown cause;    TECHNIQUE:  Low dose axial CT images obtained throughout the head without the use of intravenous contrast.  Axial, sagittal and coronal reconstructions were performed.    COMPARISON:  12/10/2023.    FINDINGS:  Intracranial compartment:    Ventricles are enlarged, similar to prior.  Third ventricle diameter again measuring up to 1.4 cm.  Ventricular enlargement out of proportion of degree of sulcal  enlargement.    Chronic small vessel ischemic change in the supratentorial white matter.  Remote right occipital infarct.  Remote left cerebellar infarct.  No acute major vascular distribution infarct.  No acute parenchymal hemorrhage.  No new intracranial mass effect or midline shift.    No extra-axial blood or fluid collections.    Skull/extracranial contents (limited evaluation):    No displaced calvarial fracture.    Mastoid air cells are clear. Mild patchy mucosal thickening in the visualized paranasal sinuses.                                       CT Abdomen Pelvis With IV Contrast NO Oral Contrast (Final result)  Result time 03/04/24 18:05:52      Final result by New Falcon MD (03/04/24 18:05:52)                   Impression:      Moderate colonic stool burden with mild wall thickening of the rectum, correlate with signs of constipation or proctitis.    Diffuse bladder wall thickening, correlate with urinalysis and symptoms of cystitis.    Diverticulosis coli without evidence of acute diverticulitis    Stable 5 mm right middle lobe solid pulmonary micronodule.    Additional findings as above.    Electronically signed by resident: Marsha Cardona  Date:    03/04/2024  Time:    17:27    Electronically signed by: New Falcon MD  Date:    03/04/2024  Time:    18:05               Narrative:    EXAMINATION:  CT ABDOMEN PELVIS WITH IV CONTRAST    CLINICAL HISTORY:  Abdominal abscess/infection suspected;    TECHNIQUE:  Axial images of the abdomen and pelvis were acquired after administration of 75 cc Omnipaque 350 IV contrast. No oral contrast was administered.  Coronal and sagittal reconstructions were also obtained.    COMPARISON:  CT 12/12/2023 and 07/26/2023.    FINDINGS:  Examination degraded by patient motion artifact.    Heart: Normal heart size.  No pericardial fluid.  No significant calcific coronary atherosclerosis.    Lungs: Evaluation of the lung bases limited due to respiratory motion.  Allowing for  this, stable linear bandlike opacities of the right lung base suggestive of atelectasis/scarring.  Stable 5 mm right middle lobe solid pulmonary micronodule (2-7).  No new focal consolidation.  No pleural effusion or pneumothorax.    Liver: Normal in size and contour.  No focal hepatic lesion.    Gallbladder: No calcified gallstones.    Bile Ducts: No biliary ductal dilatation.    Pancreas: No mass or peripancreatic fat stranding.    Spleen: Spleen is upper limits of normal in size measuring 12 cm.    Adrenals: No significant abnormalities.    Kidneys/Ureters: Normal in size and location. Normal enhancement. No hydronephrosis or nephrolithiasis. No ureteral dilatation.    Bladder: Diffuse bladder wall thickening.    Reproductive organs: Prostate is unremarkable.    Peritoneum: No free air or free fluid.    Retroperitoneum: No pathologically enlarged retroperitoneal lymph nodes.    Bowel/Mesentery: Small hiatal hernia.  Stomach is otherwise unremarkable.  Small bowel is normal in caliber without evidence of inflammation or obstruction.  Moderate colonic stool burden.  Mild wall thickening of the rectum with large fecal burden, component of proctitis to be considered.  Scattered colonic diverticuli.  Appendix not definitively visualized.  No steve-cecal soft tissue stranding.    Abdominal wall:  Small fat containing umbilical hernia. 4.7 x 1.8 cm fat containing lesion along the left anterolateral chest wall, favored to represent a lipoma.    Vasculature: Mild calcific atherosclerosis of the abdominal aorta and its branches.  No aneurysm.    Bones: Mild degenerative changes spine.  Prominent Schmorl's node at L1, unchanged.  Healing left anterolateral rib fractures.  No acute fractures.  Lucent lesion within the right femoral neck, stable dating back to multiple priors.                                       X-Ray Chest AP Portable (Final result)  Result time 03/04/24 13:37:35      Final result by Cullen Bush MD  (03/04/24 13:37:35)                   Impression:      See above      Electronically signed by: Cullen Bush MD  Date:    03/04/2024  Time:    13:37               Narrative:    EXAMINATION:  XR CHEST AP PORTABLE    CLINICAL HISTORY:  Sepsis;    TECHNIQUE:  Single frontal view of the chest was performed.    COMPARISON:  None 12/22/2023    FINDINGS:  Heart size normal.  The lungs are clear.  No pleural effusion                                       Medications   aspirin chewable tablet 81 mg (has no administration in time range)   atorvastatin tablet 20 mg (has no administration in time range)   folic acid tablet 1 mg (has no administration in time range)   insulin detemir U-100 (Levemir) pen 10 Units (10 Units Subcutaneous Given 3/4/24 2013)   losartan tablet 50 mg (has no administration in time range)   melatonin tablet 6 mg (6 mg Oral Given 3/4/24 2014)   multivitamin tablet (has no administration in time range)   risperiDONE disintegrating tablet 1 mg (has no administration in time range)   thiamine tablet 100 mg (has no administration in time range)   venlafaxine 24 hr capsule 75 mg (has no administration in time range)   carBAMazepine 12 hr tablet 400 mg (has no administration in time range)   sodium chloride 0.9% flush 10 mL (has no administration in time range)   naloxone 0.4 mg/mL injection 0.02 mg (has no administration in time range)   glucose chewable tablet 16 g (has no administration in time range)   glucose chewable tablet 24 g (has no administration in time range)   glucagon (human recombinant) injection 1 mg (has no administration in time range)   enoxaparin injection 40 mg (40 mg Subcutaneous Given 3/4/24 2013)   senna-docusate 8.6-50 mg per tablet 1 tablet (1 tablet Oral Given 3/4/24 2014)   ondansetron disintegrating tablet 8 mg (has no administration in time range)   cefTRIAXone (Rocephin) 1 g in dextrose 5 % in water (D5W) 100 mL IVPB (MB+) (1 g Intravenous New Bag 3/4/24 2013)   insulin aspart  U-100 pen 2 Units (has no administration in time range)   dextrose 10% bolus 125 mL 125 mL (has no administration in time range)   dextrose 10% bolus 250 mL 250 mL (has no administration in time range)   OLANZapine injection 2.5 mg (has no administration in time range)   remdesivir 200 mg in sodium chloride 0.9% 250 mL infusion (has no administration in time range)     Followed by   remdesivir 100 mg in sodium chloride 0.9% 250 mL infusion (has no administration in time range)   albuterol inhaler 2 puff (has no administration in time range)   benzonatate capsule 100 mg (has no administration in time range)   dextromethorphan-guaiFENesin  mg/5 ml liquid 5 mL (has no administration in time range)   lactated ringers bolus 1,000 mL (0 mLs Intravenous Stopped 3/4/24 1403)   piperacillin-tazobactam (ZOSYN) 3.375 g in dextrose 5 % in water (D5W) 100 mL IVPB (MB+) (0 g Intravenous Stopped 3/4/24 1404)   vancomycin 1,250 mg in dextrose 5 % (D5W) 250 mL IVPB (Vial-Mate) (0 mg Intravenous Stopped 3/4/24 1543)   acetaminophen suppository 650 mg (650 mg Rectal Given 3/4/24 1445)   LORazepam injection 1 mg (1 mg Intravenous Given 3/4/24 1606)   iohexoL (OMNIPAQUE 350) injection 75 mL (75 mLs Intravenous Given 3/4/24 1646)   LORazepam injection 1 mg (1 mg Intravenous Given 3/4/24 1720)   LORazepam injection 1 mg (1 mg Intravenous Given 3/4/24 1830)     Medical Decision Making  58 y.o. male with medical history of chronic pancreatitis, TBI, Diastolic HF, CHF, DM type 1, HCV, HTN, psychiatric care, IVDU presenting to the ED via EMS from home for evaluation of AMS and weakness. Noted to have a fever on ED arrival.     DDx includes but not limited to metabolic encephalopathy, UTI, PNA, polypharmacy, anticholinergic syndrome, sympathomimetic intoxication, drug/ETOH abuse, traumatic brain injury, CVA, malignancy, sundowning, delirium, meningitis. Risperdal is on medication list which makes NMS possibly as well given muscle  rigidity in his upper extremities on exam. Also considering Carbamazepine overdose and will check level.     Will start Vanc and Zosyn for empirical infectious coverage. Holding 30cc/kg IVF given history of diastolic HF. Will give 1L LR.     Amount and/or Complexity of Data Reviewed  External Data Reviewed: labs, radiology and notes.  Labs: ordered. Decision-making details documented in ED Course.  Radiology: ordered and independent interpretation performed.  ECG/medicine tests: ordered and independent interpretation performed.    Risk  OTC drugs.  Prescription drug management.  Decision regarding hospitalization.               ED Course as of 03/04/24 2021   Mon Mar 04, 2024   1613 Patient becoming more alert on reassessment after IVF and Tylenol. Fever improved. He is yelling obscenities and attempting to hit nursing staff. Will place PEC for grave disability and violent behavior.  [BA]   1615 SARS-CoV-2 RNA, Amplification, Qual(!): Positive [BA]   1615 POC Lactate(!): 2.31  Will repeat in 4 hours to ensure down trend [BA]   1615 WBC: 7.24 [BA]   1615 Hemoglobin(!): 12.5 [BA]   1615 Lipase: 34 [BA]   1615 Procalcitonin: 0.04 [BA]   1615 UDS negative [BA]   1615 CXR without acute processes [BA]   1651 Muscular rigidity also improved with IVF and fever treatment. Lower suspicion NMS. Will need CBZ level followed up.  [BA]   2014 NITRITE UA(!): Positive  UA concerning for infection [BA]   2015 CT head without acute intracranial processes. Ventriculomegaly findings that is suggestive of normal pressure hydrocephalus.  CT ab/pelv with moderate stool burden concerning for constipation. No acute intra-abdominal infectious findings. [BA]   2019 Required several doses of Ativan for agitation while in the ED.  [BA]      ED Course User Index  [BA] Davide Reed, DEONTE                               Clinical Impression:  Final diagnoses:  [R53.1] Weakness  [R00.0] Tachycardia  [U07.1] COVID  [R41.82] Altered mental status,  unspecified altered mental status type  [N39.0] Urinary tract infection without hematuria, site unspecified  [K59.00] Constipation, unspecified constipation type  [G93.41] Metabolic encephalopathy (Primary)          ED Disposition Condition    Admit                 Davide Reed PA-C  03/04/24 2021

## 2024-03-04 NOTE — FIRST PROVIDER EVALUATION
"Medical screening examination initiated.  I have conducted a focused provider triage encounter, findings are as follows:    Brief history of present illness:  Patient brought in via EMS with generalized weakness.  Patient has a nonfocal examination at this time.  He is diffusely weak.  He has weak  strength bilaterally but symmetric.  He does not flex or extend his digits of his feet or dorsiflex or plantar flex his ankles at the bilateral lower extremities at this time.  He is protecting his airway.  He intermittently yells. When I ask him what hurts, he states "I feel like shit." But is difficult informant. Cannot get pinpointed questions answered at this time. He arrives febrile and tachy. Initiating sepsis workup.    Vitals:    03/04/24 1121   BP: (!) 164/94   BP Location: Right arm   Patient Position: Sitting   Pulse: (!) 113   Resp: 20   Temp: (!) 100.4 °F (38 °C)   TempSrc: Oral   SpO2: 97%   Weight: 63.5 kg (140 lb)   Height: 6' (1.829 m)       Pertinent physical exam:  Weak diffusely. No focality to exam.     Brief workup plan:  Initiating sepsis workup.    Preliminary workup initiated; this workup will be continued and followed by the physician or advanced practice provider that is assigned to the patient when roomed.  "

## 2024-03-04 NOTE — ED TRIAGE NOTES
"Patient comes into the emergency department by EMS with complaints of weakness. Pt is lethargic and difficult to arouse. Patient states that he "hasn't been feeling good". AAOx1. MARU further symptoms.  "

## 2024-03-05 PROBLEM — E10.65 TYPE 1 DIABETES MELLITUS WITH HYPERGLYCEMIA: Status: ACTIVE | Noted: 2021-05-02

## 2024-03-05 PROBLEM — I69.351 HEMIPARESIS AFFECTING RIGHT SIDE AS LATE EFFECT OF STROKE: Status: ACTIVE | Noted: 2017-11-23

## 2024-03-05 LAB
ALBUMIN SERPL BCP-MCNC: 3.2 G/DL (ref 3.5–5.2)
ALP SERPL-CCNC: 106 U/L (ref 55–135)
ALT SERPL W/O P-5'-P-CCNC: 18 U/L (ref 10–44)
ANION GAP SERPL CALC-SCNC: 10 MMOL/L (ref 8–16)
AST SERPL-CCNC: 17 U/L (ref 10–40)
BASOPHILS # BLD AUTO: 0.03 K/UL (ref 0–0.2)
BASOPHILS NFR BLD: 0.6 % (ref 0–1.9)
BILIRUB SERPL-MCNC: 0.3 MG/DL (ref 0.1–1)
BUN SERPL-MCNC: 16 MG/DL (ref 6–20)
CALCIUM SERPL-MCNC: 9.1 MG/DL (ref 8.7–10.5)
CHLORIDE SERPL-SCNC: 101 MMOL/L (ref 95–110)
CO2 SERPL-SCNC: 25 MMOL/L (ref 23–29)
CREAT SERPL-MCNC: 1.1 MG/DL (ref 0.5–1.4)
DIFFERENTIAL METHOD BLD: ABNORMAL
EOSINOPHIL # BLD AUTO: 0.1 K/UL (ref 0–0.5)
EOSINOPHIL NFR BLD: 1.4 % (ref 0–8)
ERYTHROCYTE [DISTWIDTH] IN BLOOD BY AUTOMATED COUNT: 14.4 % (ref 11.5–14.5)
EST. GFR  (NO RACE VARIABLE): >60 ML/MIN/1.73 M^2
GLUCOSE SERPL-MCNC: 240 MG/DL (ref 70–110)
HCT VFR BLD AUTO: 39.6 % (ref 40–54)
HGB BLD-MCNC: 12.2 G/DL (ref 14–18)
IMM GRANULOCYTES # BLD AUTO: 0.02 K/UL (ref 0–0.04)
IMM GRANULOCYTES NFR BLD AUTO: 0.4 % (ref 0–0.5)
LYMPHOCYTES # BLD AUTO: 1.1 K/UL (ref 1–4.8)
LYMPHOCYTES NFR BLD: 21 % (ref 18–48)
MAGNESIUM SERPL-MCNC: 1.8 MG/DL (ref 1.6–2.6)
MCH RBC QN AUTO: 25.1 PG (ref 27–31)
MCHC RBC AUTO-ENTMCNC: 30.8 G/DL (ref 32–36)
MCV RBC AUTO: 82 FL (ref 82–98)
MONOCYTES # BLD AUTO: 0.9 K/UL (ref 0.3–1)
MONOCYTES NFR BLD: 18 % (ref 4–15)
NEUTROPHILS # BLD AUTO: 2.9 K/UL (ref 1.8–7.7)
NEUTROPHILS NFR BLD: 58.6 % (ref 38–73)
NRBC BLD-RTO: 0 /100 WBC
PHOSPHATE SERPL-MCNC: 3.3 MG/DL (ref 2.7–4.5)
PLATELET # BLD AUTO: 196 K/UL (ref 150–450)
PMV BLD AUTO: 9.7 FL (ref 9.2–12.9)
POCT GLUCOSE: 231 MG/DL (ref 70–110)
POCT GLUCOSE: 408 MG/DL (ref 70–110)
POTASSIUM SERPL-SCNC: 4.6 MMOL/L (ref 3.5–5.1)
PROT SERPL-MCNC: 7.5 G/DL (ref 6–8.4)
RBC # BLD AUTO: 4.86 M/UL (ref 4.6–6.2)
SODIUM SERPL-SCNC: 136 MMOL/L (ref 136–145)
WBC # BLD AUTO: 5 K/UL (ref 3.9–12.7)

## 2024-03-05 PROCEDURE — 80053 COMPREHEN METABOLIC PANEL: CPT

## 2024-03-05 PROCEDURE — 36415 COLL VENOUS BLD VENIPUNCTURE: CPT

## 2024-03-05 PROCEDURE — 83735 ASSAY OF MAGNESIUM: CPT

## 2024-03-05 PROCEDURE — 63600175 PHARM REV CODE 636 W HCPCS: Mod: JZ,JG

## 2024-03-05 PROCEDURE — 25000003 PHARM REV CODE 250

## 2024-03-05 PROCEDURE — 84100 ASSAY OF PHOSPHORUS: CPT

## 2024-03-05 PROCEDURE — 63600175 PHARM REV CODE 636 W HCPCS

## 2024-03-05 PROCEDURE — 21400001 HC TELEMETRY ROOM

## 2024-03-05 PROCEDURE — 25000003 PHARM REV CODE 250: Performed by: HOSPITALIST

## 2024-03-05 PROCEDURE — 85025 COMPLETE CBC W/AUTO DIFF WBC: CPT

## 2024-03-05 PROCEDURE — 27000207 HC ISOLATION

## 2024-03-05 RX ORDER — SODIUM CHLORIDE, SODIUM LACTATE, POTASSIUM CHLORIDE, CALCIUM CHLORIDE 600; 310; 30; 20 MG/100ML; MG/100ML; MG/100ML; MG/100ML
INJECTION, SOLUTION INTRAVENOUS CONTINUOUS
Status: ACTIVE | OUTPATIENT
Start: 2024-03-05 | End: 2024-03-05

## 2024-03-05 RX ORDER — POLYETHYLENE GLYCOL 3350 17 G/17G
17 POWDER, FOR SOLUTION ORAL
Status: DISCONTINUED | OUTPATIENT
Start: 2024-03-05 | End: 2024-03-19 | Stop reason: HOSPADM

## 2024-03-05 RX ORDER — LABETALOL HCL 20 MG/4 ML
10 SYRINGE (ML) INTRAVENOUS EVERY 6 HOURS PRN
Status: DISCONTINUED | OUTPATIENT
Start: 2024-03-05 | End: 2024-03-19 | Stop reason: HOSPADM

## 2024-03-05 RX ORDER — OLANZAPINE 10 MG/2ML
5 INJECTION, POWDER, FOR SOLUTION INTRAMUSCULAR ONCE AS NEEDED
Status: COMPLETED | OUTPATIENT
Start: 2024-03-05 | End: 2024-03-05

## 2024-03-05 RX ORDER — INSULIN ASPART 100 [IU]/ML
6 INJECTION, SOLUTION INTRAVENOUS; SUBCUTANEOUS
Status: DISCONTINUED | OUTPATIENT
Start: 2024-03-05 | End: 2024-03-09

## 2024-03-05 RX ADMIN — ASPIRIN 81 MG CHEWABLE TABLET 81 MG: 81 TABLET CHEWABLE at 09:03

## 2024-03-05 RX ADMIN — INSULIN ASPART 2 UNITS: 100 INJECTION, SOLUTION INTRAVENOUS; SUBCUTANEOUS at 10:03

## 2024-03-05 RX ADMIN — ATORVASTATIN CALCIUM 20 MG: 20 TABLET, FILM COATED ORAL at 09:03

## 2024-03-05 RX ADMIN — SENNOSIDES AND DOCUSATE SODIUM 1 TABLET: 8.6; 5 TABLET ORAL at 08:03

## 2024-03-05 RX ADMIN — THERA TABS 1 TABLET: TAB at 09:03

## 2024-03-05 RX ADMIN — Medication 100 MG: at 09:03

## 2024-03-05 RX ADMIN — RISPERIDONE 1 MG: 1 TABLET, ORALLY DISINTEGRATING ORAL at 10:03

## 2024-03-05 RX ADMIN — POLYETHYLENE GLYCOL 3350 17 G: 17 POWDER, FOR SOLUTION ORAL at 08:03

## 2024-03-05 RX ADMIN — CARBAMAZEPINE 400 MG: 200 TABLET, EXTENDED RELEASE ORAL at 09:03

## 2024-03-05 RX ADMIN — SODIUM CHLORIDE, POTASSIUM CHLORIDE, SODIUM LACTATE AND CALCIUM CHLORIDE: 600; 310; 30; 20 INJECTION, SOLUTION INTRAVENOUS at 11:03

## 2024-03-05 RX ADMIN — CEFTRIAXONE 1 G: 1 INJECTION, POWDER, FOR SOLUTION INTRAMUSCULAR; INTRAVENOUS at 08:03

## 2024-03-05 RX ADMIN — REMDESIVIR 100 MG: 100 INJECTION, POWDER, LYOPHILIZED, FOR SOLUTION INTRAVENOUS at 09:03

## 2024-03-05 RX ADMIN — RISPERIDONE 1 MG: 1 TABLET, ORALLY DISINTEGRATING ORAL at 08:03

## 2024-03-05 RX ADMIN — VENLAFAXINE HYDROCHLORIDE 75 MG: 75 CAPSULE, EXTENDED RELEASE ORAL at 09:03

## 2024-03-05 RX ADMIN — Medication 6 MG: at 08:03

## 2024-03-05 RX ADMIN — INSULIN ASPART 6 UNITS: 100 INJECTION, SOLUTION INTRAVENOUS; SUBCUTANEOUS at 03:03

## 2024-03-05 RX ADMIN — LOSARTAN POTASSIUM 50 MG: 50 TABLET, FILM COATED ORAL at 09:03

## 2024-03-05 RX ADMIN — FOLIC ACID 1 MG: 1 TABLET ORAL at 09:03

## 2024-03-05 RX ADMIN — ENOXAPARIN SODIUM 40 MG: 40 INJECTION SUBCUTANEOUS at 04:03

## 2024-03-05 RX ADMIN — POLYETHYLENE GLYCOL 3350 17 G: 17 POWDER, FOR SOLUTION ORAL at 03:03

## 2024-03-05 RX ADMIN — OLANZAPINE 5 MG: 10 INJECTION, POWDER, FOR SOLUTION INTRAMUSCULAR at 03:03

## 2024-03-05 RX ADMIN — SENNOSIDES AND DOCUSATE SODIUM 1 TABLET: 8.6; 5 TABLET ORAL at 09:03

## 2024-03-05 NOTE — ED NOTES
Telemetry Verification   Patient placed on Telemetry Box  Verified with War Room  Tech    Box # [id number]   Rate 107   Rhythm NSR

## 2024-03-05 NOTE — ASSESSMENT & PLAN NOTE
Patient's FSGs are uncontrolled due to hyperglycemia on current medication regimen.  Last A1c reviewed-   Lab Results   Component Value Date    HGBA1C 11.3 (H) 12/10/2023     Most recent fingerstick glucose reviewed-   Recent Labs   Lab 03/04/24  1733   POCTGLUCOSE 346*     Current correctional scale  Low    Antihyperglycemics (From admission, onward)      Start     Stop Route Frequency Ordered    03/05/24 0715  insulin aspart U-100 pen 2 Units         -- SubQ 3 times daily with meals 03/04/24 1904    03/04/24 2100  insulin detemir U-100 (Levemir) pen 10 Units         -- SubQ Nightly 03/04/24 1836            -- Hold oral hyperglycemics  -- SSI, POCT BG qACHS; titrate basal/bolus regimen PRN to 140-180 goal  -- Diabetic diet

## 2024-03-05 NOTE — SUBJECTIVE & OBJECTIVE
"Past Medical History:   Diagnosis Date    Acute on chronic pancreatitis 04/03/2016    Addiction to drug     CHF (congestive heart failure)     DKA (diabetic ketoacidoses) 02/2020    DM (diabetes mellitus), type 1     HCV (hepatitis C virus)     high VL     HTN (hypertension)     Hx of psychiatric care     Hypomagnesemia 05/25/2020    IVDU (intravenous drug user)     Heroin    Pancreatitis     Psychiatric problem     Stroke     Substance abuse        Past Surgical History:   Procedure Laterality Date    CARDIAC SURGERY  1999    stent placed. ([facility])    ESOPHAGOGASTRODUODENOSCOPY N/A 3/5/2020    Gastritis.  No H pylori.  Completed PPI for 1 month.  Procedure: EGD (ESOPHAGOGASTRODUODENOSCOPY);  Surgeon: Brinda Rene MD;  Location: [facility] ENDO;  Service: Endoscopy;  Laterality: N/A;  [id number]; [phone]    LEFT HEART CATHETERIZATION Left 9/28/2020    Procedure: Left heart cath;  Surgeon: Fito Rangel MD;  Location: [facility] CATH LAB;  Service: Cardiology;  Laterality: Left;    SHOULDER SURGERY  2013    right shoulder, spur removal.       Review of patient's allergies indicates:  No Known Allergies    No current facility-administered medications on file prior to encounter.     Current Outpatient Medications on File Prior to Encounter   Medication Sig    albuterol (PROVENTIL/VENTOLIN HFA) 90 mcg/actuation inhaler Inhale 1-2 puffs into the lungs every 6 (six) hours as needed for Wheezing. Rescue    aspirin 81 MG Chew Take 1 tablet (81 mg total) by mouth once daily.    atorvastatin (LIPITOR) 20 MG tablet Take 1 tablet (20 mg total) by mouth once daily.    BD GERTRUDIS 2ND GEN PEN NEEDLE 32 gauge x 5/32" Ndle Inject into the skin 4 (four) times daily.    carBAMazepine (TEGRETOL XR) 200 MG 12 hr tablet Take 1 tablet (200 mg total) by mouth once daily. (Patient taking differently: Take 400 mg by mouth once daily.)    cetirizine (ZYRTEC) 10 MG tablet Take 1 tablet (10 mg total) by mouth once daily.    clopidogreL (PLAVIX) " 75 mg tablet Take 1 tablet (75 mg total) by mouth once daily.    folic acid (FOLVITE) 1 MG tablet Take 1 tablet (1 mg total) by mouth once daily.    FREESTYLE SEBASTIAN 2 SENSOR Kit USE FOUR TIMES PER WEEK AS DIRECTED    insulin detemir U-100, Levemir, 100 unit/mL (3 mL) SubQ InPn pen Inject 10 Units into the skin every evening.    insulin detemir U-100, Levemir, 100 unit/mL (3 mL) SubQ InPn pen Inject 17 Units into the skin once daily.    insulin lispro 100 unit/mL pen Inject into the skin.    levocetirizine (XYZAL) 5 MG tablet Take 5 mg by mouth every evening.    loratadine (CLARITIN) 10 mg tablet Take 10 mg by mouth.    losartan (COZAAR) 50 MG tablet Take 1 tablet (50 mg total) by mouth once daily.    melatonin (MELATIN) 3 mg tablet Take 2 tablets (6 mg total) by mouth nightly.    metoprolol succinate (TOPROL-XL) 25 MG 24 hr tablet Take 1 tablet (25 mg total) by mouth once daily.    multivitamin Tab Take 1 tablet by mouth once daily.    naloxone (NARCAN) 4 mg/actuation Spry 4mg by nasal route as needed for opioid overdose; may repeat every 2-3 minutes in alternating nostrils until medical help arrives. Call 911    ONETOUCH DELICA PLUS LANCET 33 gauge Misc Apply topically 4 (four) times daily.    ONETOUCH ULTRA TEST Strp USE TO TEST BLOOD SUGAR FOUR TIMES DAILY    ONETOUCH ULTRA2 METER Misc 4 (four) times daily.    risperiDONE (RISPERDAL M-TABS) 1 MG TbDL Take 1 tablet (1 mg total) by mouth 2 (two) times daily.    thiamine 100 MG tablet Take 1 tablet (100 mg total) by mouth once daily.    venlafaxine (EFFEXOR-XR) 75 MG 24 hr capsule Take 1 capsule (75 mg total) by mouth once daily.    [DISCONTINUED] insulin aspart U-100 (NOVOLOG) 100 unit/mL injection Inject 1-10 Units into the skin 3 (three) times daily with meals.     Family History       Problem Relation (Age of Onset)    Asthma Mother          Tobacco Use    Smoking status: Former     Current packs/day: 0.00     Average packs/day: 1 pack/day for 39.2 years (39.2  ttl pk-yrs)     Types: Cigarettes     Start date: 4/3/1981     Quit date: 6/1/2020     Years since quitting: 3.7    Smokeless tobacco: Never    Tobacco comments:     states approx 1 per day.   Substance and Sexual Activity    Alcohol use: Not Currently     Alcohol/week: 0.0 standard drinks of alcohol    Drug use: Yes     Types: Heroin, IV, Marijuana, Amphetamines     Comment: chronic methamphetamine user    Sexual activity: Yes     Partners: Female     Review of Systems   Unable to perform ROS: Mental status change     Objective:     Vital Signs (Most Recent):  Temp: 98.2 °F (36.8 °C) (03/04/24 1745)  Pulse: 109 (03/04/24 1221)  Resp: 20 (03/04/24 1121)  BP: (!) 190/105 (03/04/24 1221)  SpO2: 97 % (03/04/24 1221) Vital Signs (24h Range):  Temp:  [98.2 °F (36.8 °C)-101.5 °F (38.6 °C)] 98.2 °F (36.8 °C)  Pulse:  [109-113] 109  Resp:  [20] 20  SpO2:  [97 %] 97 %  BP: (164-190)/() 190/105     Weight: 63.5 kg (140 lb)  Body mass index is 18.99 kg/m².     Limited Physical Exam conducted d/t patient's uncooperation and verbal aggression  Physical Exam  Constitutional:       Comments: Disheveled    HENT:      Head: Normocephalic.   Eyes:      General: No scleral icterus.     Extraocular Movements: Extraocular movements intact.   Cardiovascular:      Rate and Rhythm: Regular rhythm. Tachycardia present.   Pulmonary:      Effort: No respiratory distress.      Breath sounds: Normal breath sounds. No wheezing or rales.   Abdominal:      General: There is no distension.      Palpations: Abdomen is soft.   Musculoskeletal:      Cervical back: Normal range of motion.   Skin:     General: Skin is warm and dry.      Findings: No rash.   Neurological:      Mental Status: He is alert.   Psychiatric:      Comments: Oriented to person and place. Uncooperative with questioning and physical exam. Verbally abusive.              Significant Labs: All pertinent labs within the past 24 hours have been reviewed.  Blood Culture: No  "results for input(s): "LABBLOO" in the last 48 hours.  CBC:   Recent Labs   Lab 03/04/24  1254   WBC 7.24   HGB 12.5*   HCT 39.6*        CMP:   Recent Labs   Lab 03/04/24  1254      K 4.1      CO2 25   *   BUN 17   CREATININE 1.1   CALCIUM 9.4   PROT 7.8   ALBUMIN 3.4*   BILITOT 0.4   ALKPHOS 114   AST 13   ALT 17   ANIONGAP 10     Lactic Acid: No results for input(s): "LACTATE" in the last 48 hours.  Urine Studies:   Recent Labs   Lab 03/04/24  1500   COLORU Colorless*   APPEARANCEUA Clear   PHUR 6.0   SPECGRAV 1.020   PROTEINUA 1+*   GLUCUA 4+*   KETONESU 1+*   BILIRUBINUA Negative   OCCULTUA 1+*   NITRITE Positive*   LEUKOCYTESUR Negative   RBCUA 10*   WBCUA 3   BACTERIA None   SQUAMEPITHEL 0   HYALINECASTS 0       Significant Imaging: I have reviewed all pertinent imaging results/findings within the past 24 hours.  "

## 2024-03-05 NOTE — ED NOTES
Patient identifiers for Cali Mabry 58 y.o. male checked and correct.  Chief Complaint   Patient presents with    Weakness     + pt. Presents from EMS for weakness unknown for how long it has been happening, patient AAOx1, also experiencing some expressive aphasia, hx of stroke (right sided deficits noted)     Past Medical History:   Diagnosis Date    Acute on chronic pancreatitis 04/03/2016    Addiction to drug     CHF (congestive heart failure)     DKA (diabetic ketoacidoses) 02/2020    DM (diabetes mellitus), type 1     HCV (hepatitis C virus)     high VL     HTN (hypertension)     Hx of psychiatric care     Hypomagnesemia 05/25/2020    IVDU (intravenous drug user)     Heroin    Pancreatitis     Psychiatric problem     Stroke     Substance abuse      Allergies reported: Review of patient's allergies indicates:  No Known Allergies      HEENT: Denies vision changes. Denies ear drainage or hearing loss. No c/o nasal drainage. Denies dysphagia or voice changes.   Appearance: Pt awake, alert & oriented to person, place & time. Pt in no acute distress at present time. Pt is clean and well groomed with clothes appropriately fastened.   Skin: Skin warm, dry & intact. Color consistent with ethnicity. Mucous membranes moist. No breakdown or brusing noted.   Musculoskeletal: Patient moving all extremities well, no obvious swelling or deformities noted.   Respiratory: Respirations spontaneous, even, and non-labored. Visible chest rise noted. Airway is open and patent. No accessory muscle use noted.   Neurologic: Sensation is intact. Speech is clear and appropriate. Eyes open spontaneously, behavior appropriate to situation, follows commands, facial expression symmetrical, bilateral hand grasp equal and even, purposeful motor response noted.  Cardiac: All peripheral pulses present. No Bilateral lower extremity edema. Cap refill is <3 seconds.  Abdomen: Abdomen soft, non distended, non tender to palpation.   : Pt  voids independently, denies dysuria, hematuria, frequency.

## 2024-03-05 NOTE — PLAN OF CARE
Pt was agitated, pt was given medication. SW unable to complete pt DPA and will attempt at a later time or contact collateral on file.    ARNOLD Kiser   Case Management Dept-[facility]  [phone]

## 2024-03-05 NOTE — NURSING
Nurses Note -- 4 Eyes      3/5/2024   4:22 AM      Skin assessed during: Admit      [x] No Altered Skin Integrity Present    []Prevention Measures Documented      [] Yes- Altered Skin Integrity Present or Discovered   [] LDA Added if Not in Epic (Describe Wound)   [] New Altered Skin Integrity was Present on Admit and Documented in LDA   [] Wound Image Taken    Wound Care Consulted? No    Attending Nurse:  Griffin Mackenzie RN/Staff Member:   cira fisher

## 2024-03-05 NOTE — ASSESSMENT & PLAN NOTE
Patient's anemia is currently controlled. Has not received any PRBCs to date. Etiology likely d/t chronic disease due to recurrent infections, inflammation  Current CBC reviewed-   Lab Results   Component Value Date    HGB 12.5 (L) 03/04/2024    HCT 39.6 (L) 03/04/2024     Monitor serial CBC and transfuse if patient becomes hemodynamically unstable, symptomatic or H/H drops below 7/21.

## 2024-03-05 NOTE — CLINICAL REVIEW
Sepsis Screen (most recent)       Sepsis Screen (IP) - 03/04/24 1814       Is the patient's history or complaint suggestive of a possible infection? Yes  -    Are there at least two of the following signs and symptoms present? Yes  -    Sepsis signs/symptoms - Tachycardia Tachycardia     >90  -    Sepsis signs/symptoms - Altered Mental Status Altered Mental Status  -    Are any of the following organ dysfunction criteria present and not considered to be due to a chronic condition? Yes  -    Organ Dysfunction Criteria Lactate > 2.0  -    Initiate Sepsis Protocol No  -    Reason sepsis not considered Pt. receiving appropriate management  -              User Key  (r) = Recorded By, (t) = Taken By, (c) = Cosigned By      Initials Name    Serina Avina RN

## 2024-03-05 NOTE — ASSESSMENT & PLAN NOTE
Hyperlipidemia    Known CAD. Patient not exhibiting ACS symptoms at this time.     Last known LHC done 09/2023:  1.  60% in stent restenosis of previously placed right coronary artery stent.  2.  Successful balloon angioplasty with a 3 0 x 20 fall by 3.5 x 20 noncompliant balloon.  60% stenosis reduced to 0%.  YENI 3 flow.  No dissection.  3.  50% stenosis noted in the mid and distal left anterior descending artery.  There is a 70 % stenosis at the apex, small caliber.    -- Continue HI Statin  -- Continue ASA 81  -- Hold Plavix as patient's > 1 year from last PCI

## 2024-03-05 NOTE — ASSESSMENT & PLAN NOTE
Patient HCV quantitative count low in 03/2023. Per Hepatology review, if repeat count in 3 months was similar, treatment would not be indicated. 08/2023 count < 12.    -- Outpatient follow up

## 2024-03-05 NOTE — ASSESSMENT & PLAN NOTE
Chronic, controlled. Latest blood pressure and vitals reviewed-     Temp:  [98.2 °F (36.8 °C)-101.5 °F (38.6 °C)]   Pulse:  []   Resp:  [20]   BP: (164-190)/()   SpO2:  [97 %-98 %] .   Home meds for hypertension were reviewed and noted below.   Hypertension Medications               losartan (COZAAR) 50 MG tablet Take 1 tablet (50 mg total) by mouth once daily.    metoprolol succinate (TOPROL-XL) 25 MG 24 hr tablet Take 1 tablet (25 mg total) by mouth once daily.          -- Restart losartan while IP; titrate up prior to restarting home rate-predominant BB

## 2024-03-05 NOTE — ASSESSMENT & PLAN NOTE
History of recent methamphetamine, tobacco, alcohol use.     -- Likely contributor to psychiatric disorder and agitation

## 2024-03-05 NOTE — ASSESSMENT & PLAN NOTE
Patient presenting with incidental COVID+ 03/04/2024. No symptoms of dyspnea, shortness of breath. Presenting for unspecified weakness. No hypoxemia.  COVID-19 testing:   Isolation: Airborne/Droplet. Surgical mask on patient. Notify Infection Control    -- No O2 requirement at this time  -- COVID risk score: 5; will initiate High-Risk Remdesivir 3 day course  -- No role for Dexamethasone d/t normal SpO2 on RA  -- No role for CAP coverage d/t lack of focal consolidation on CXR  -- No role for therapeutic AC d/t normal SpO2 on RA  -- Acetaminophen 650mg PO Q6hr PRN fever/headache  -- IVF if indicated, restrictive strategy preferred, no maintenance IV if able  -- antitussives: dextromethorphan/guaifenesin, tessalon PRN  -- albuterol: 2-4 puff q6h if patient wheezing

## 2024-03-05 NOTE — ASSESSMENT & PLAN NOTE
Likely 2/2 to drug use, but underlying COVID+ infection and possible UTI may be contributing factors. Utox negative, but reported history of recent methamphetamine use.     -- Patient continues to be agitated and verbally abusive to medical / nursing staff  -- ED PEC'ed patient prior to admission to . On my encounter, patient is uncooperative with encounter and therefore capacity is unable to be ascertained at this point  -- Capacity evaluation QD  -- PRN Zyprexa for non-redirectable agitation / violence  -- Treat underlying infection  -- See Bipolar Disorder A/P for home medications

## 2024-03-05 NOTE — HPI
58 M with DM1, extensive IVDU, HCV Ab+, HTN, previous CVA with R sided deficits, HFpEF, CAD, chronic pancreatitis, mixed mood / psychiatric disorder presenting to [facility] ED with chief complaint of weakness of unspecified duration and onset. Admits to using shooting methamphetamines. Rest of history from patient is non-contributory due to uncooperation and alternating agitation and somnolence. HPI is written with review of previous medical records, ED records, discussed with patient's mother via telephone, who was able to provide limited insight into the patient's acute medical condition. Patient's mother states that patient's PO intake has decreased but he would not cooperate with her nor would he tell her what was bothering him. Patient reported to want to stay in bed for the last 2 days.     Of note, patient was recently discharged from [facility] following treatment of DKA and UTI (largely pan-sensitive Proteus mirabilis). In ED, patient was extremely agitated, given multiple doses of Ativan due to agitation, aggressive/abusive statements towards nursing and medical staff. Tachycardic to 110s, hypertensive, febrile to 101.5. Given broad spectrum Abx (Vanc/Zosyn). PEC'ed by ED due to extreme agitation.

## 2024-03-05 NOTE — ASSESSMENT & PLAN NOTE
UA with Nitrite+. Unable to discern symptoms d/t poor cooperation during ROS. Febrile to 101.5, although this may be related to COVID+. Previous UCX with largely pan-sensitive Proteus mirabilis.    -- No WBC or bacteria in UA makes acute UTI less certain; however, given Nitrite+ and new fever, will treat as UTI.  -- CTX 1 g x 5 days

## 2024-03-06 LAB
ALBUMIN SERPL BCP-MCNC: 2.8 G/DL (ref 3.5–5.2)
ALP SERPL-CCNC: 92 U/L (ref 55–135)
ALT SERPL W/O P-5'-P-CCNC: 16 U/L (ref 10–44)
ANION GAP SERPL CALC-SCNC: 10 MMOL/L (ref 8–16)
AST SERPL-CCNC: 15 U/L (ref 10–40)
BASOPHILS # BLD AUTO: 0.01 K/UL (ref 0–0.2)
BASOPHILS NFR BLD: 0.3 % (ref 0–1.9)
BILIRUB SERPL-MCNC: 0.2 MG/DL (ref 0.1–1)
BUN SERPL-MCNC: 22 MG/DL (ref 6–20)
CALCIUM SERPL-MCNC: 8.8 MG/DL (ref 8.7–10.5)
CHLORIDE SERPL-SCNC: 101 MMOL/L (ref 95–110)
CO2 SERPL-SCNC: 27 MMOL/L (ref 23–29)
CREAT SERPL-MCNC: 1 MG/DL (ref 0.5–1.4)
DIFFERENTIAL METHOD BLD: ABNORMAL
EOSINOPHIL # BLD AUTO: 0.2 K/UL (ref 0–0.5)
EOSINOPHIL NFR BLD: 4.6 % (ref 0–8)
ERYTHROCYTE [DISTWIDTH] IN BLOOD BY AUTOMATED COUNT: 14.2 % (ref 11.5–14.5)
EST. GFR  (NO RACE VARIABLE): >60 ML/MIN/1.73 M^2
GLUCOSE SERPL-MCNC: 189 MG/DL (ref 70–110)
HCT VFR BLD AUTO: 33.9 % (ref 40–54)
HGB BLD-MCNC: 10.8 G/DL (ref 14–18)
IMM GRANULOCYTES # BLD AUTO: 0.01 K/UL (ref 0–0.04)
IMM GRANULOCYTES NFR BLD AUTO: 0.3 % (ref 0–0.5)
LYMPHOCYTES # BLD AUTO: 0.7 K/UL (ref 1–4.8)
LYMPHOCYTES NFR BLD: 21.6 % (ref 18–48)
MAGNESIUM SERPL-MCNC: 1.9 MG/DL (ref 1.6–2.6)
MCH RBC QN AUTO: 25.6 PG (ref 27–31)
MCHC RBC AUTO-ENTMCNC: 31.9 G/DL (ref 32–36)
MCV RBC AUTO: 80 FL (ref 82–98)
MONOCYTES # BLD AUTO: 0.6 K/UL (ref 0.3–1)
MONOCYTES NFR BLD: 18.9 % (ref 4–15)
NEUTROPHILS # BLD AUTO: 1.8 K/UL (ref 1.8–7.7)
NEUTROPHILS NFR BLD: 54.3 % (ref 38–73)
NRBC BLD-RTO: 0 /100 WBC
PHOSPHATE SERPL-MCNC: 3.7 MG/DL (ref 2.7–4.5)
PLATELET # BLD AUTO: 190 K/UL (ref 150–450)
PMV BLD AUTO: 9.9 FL (ref 9.2–12.9)
POCT GLUCOSE: 221 MG/DL (ref 70–110)
POCT GLUCOSE: 261 MG/DL (ref 70–110)
POCT GLUCOSE: 320 MG/DL (ref 70–110)
POCT GLUCOSE: 359 MG/DL (ref 70–110)
POTASSIUM SERPL-SCNC: 3.9 MMOL/L (ref 3.5–5.1)
PROT SERPL-MCNC: 6.6 G/DL (ref 6–8.4)
RBC # BLD AUTO: 4.22 M/UL (ref 4.6–6.2)
SODIUM SERPL-SCNC: 138 MMOL/L (ref 136–145)
WBC # BLD AUTO: 3.28 K/UL (ref 3.9–12.7)

## 2024-03-06 PROCEDURE — 36415 COLL VENOUS BLD VENIPUNCTURE: CPT

## 2024-03-06 PROCEDURE — 27000207 HC ISOLATION

## 2024-03-06 PROCEDURE — 84100 ASSAY OF PHOSPHORUS: CPT

## 2024-03-06 PROCEDURE — 25000003 PHARM REV CODE 250: Performed by: HOSPITALIST

## 2024-03-06 PROCEDURE — 90792 PSYCH DIAG EVAL W/MED SRVCS: CPT | Mod: GC,,, | Performed by: PSYCHIATRY & NEUROLOGY

## 2024-03-06 PROCEDURE — 25000003 PHARM REV CODE 250

## 2024-03-06 PROCEDURE — 85025 COMPLETE CBC W/AUTO DIFF WBC: CPT

## 2024-03-06 PROCEDURE — 63600175 PHARM REV CODE 636 W HCPCS

## 2024-03-06 PROCEDURE — 21400001 HC TELEMETRY ROOM

## 2024-03-06 PROCEDURE — 80053 COMPREHEN METABOLIC PANEL: CPT

## 2024-03-06 PROCEDURE — 83735 ASSAY OF MAGNESIUM: CPT

## 2024-03-06 RX ADMIN — RISPERIDONE 1 MG: 1 TABLET, ORALLY DISINTEGRATING ORAL at 08:03

## 2024-03-06 RX ADMIN — SENNOSIDES AND DOCUSATE SODIUM 1 TABLET: 8.6; 5 TABLET ORAL at 08:03

## 2024-03-06 RX ADMIN — ASPIRIN 81 MG CHEWABLE TABLET 81 MG: 81 TABLET CHEWABLE at 10:03

## 2024-03-06 RX ADMIN — INSULIN ASPART 6 UNITS: 100 INJECTION, SOLUTION INTRAVENOUS; SUBCUTANEOUS at 12:03

## 2024-03-06 RX ADMIN — LOSARTAN POTASSIUM 50 MG: 50 TABLET, FILM COATED ORAL at 10:03

## 2024-03-06 RX ADMIN — POLYETHYLENE GLYCOL 3350 17 G: 17 POWDER, FOR SOLUTION ORAL at 09:03

## 2024-03-06 RX ADMIN — THERA TABS 1 TABLET: TAB at 10:03

## 2024-03-06 RX ADMIN — FOLIC ACID 1 MG: 1 TABLET ORAL at 10:03

## 2024-03-06 RX ADMIN — INSULIN ASPART 6 UNITS: 100 INJECTION, SOLUTION INTRAVENOUS; SUBCUTANEOUS at 08:03

## 2024-03-06 RX ADMIN — CEFTRIAXONE 1 G: 1 INJECTION, POWDER, FOR SOLUTION INTRAMUSCULAR; INTRAVENOUS at 09:03

## 2024-03-06 RX ADMIN — ENOXAPARIN SODIUM 40 MG: 40 INJECTION SUBCUTANEOUS at 04:03

## 2024-03-06 RX ADMIN — POLYETHYLENE GLYCOL 3350 17 G: 17 POWDER, FOR SOLUTION ORAL at 08:03

## 2024-03-06 RX ADMIN — INSULIN ASPART 6 UNITS: 100 INJECTION, SOLUTION INTRAVENOUS; SUBCUTANEOUS at 04:03

## 2024-03-06 RX ADMIN — VENLAFAXINE HYDROCHLORIDE 75 MG: 75 CAPSULE, EXTENDED RELEASE ORAL at 10:03

## 2024-03-06 RX ADMIN — REMDESIVIR 100 MG: 100 INJECTION, POWDER, LYOPHILIZED, FOR SOLUTION INTRAVENOUS at 10:03

## 2024-03-06 RX ADMIN — RISPERIDONE 1 MG: 1 TABLET, ORALLY DISINTEGRATING ORAL at 10:03

## 2024-03-06 RX ADMIN — SENNOSIDES AND DOCUSATE SODIUM 1 TABLET: 8.6; 5 TABLET ORAL at 10:03

## 2024-03-06 RX ADMIN — ATORVASTATIN CALCIUM 20 MG: 20 TABLET, FILM COATED ORAL at 10:03

## 2024-03-06 RX ADMIN — Medication 100 MG: at 10:03

## 2024-03-06 RX ADMIN — CARBAMAZEPINE 400 MG: 200 TABLET, EXTENDED RELEASE ORAL at 09:03

## 2024-03-06 RX ADMIN — Medication 6 MG: at 08:03

## 2024-03-06 NOTE — MEDICAL/APP STUDENT
[facility] Medicine  Progress Note    Patient Name: Cali Mabry  MRN: [id number]  Patient Class: IP- Inpatient   Admission Date: 3/4/2024  Length of Stay: 2 days  Attending Physician: Doroteo Vyas MD  Primary Care Provider: Lawanda Flores NP        Subjective:     Principal Problem:Acute encephalopathy        HPI:  58 M with DM1, extensive IVDU, HCV Ab+, HTN, previous CVA with R sided deficits, HFpEF, CAD, chronic pancreatitis, mixed mood / psychiatric disorder presenting to [facility] ED with chief complaint of weakness of unspecified duration and onset. Admits to using shooting methamphetamines. Rest of history from patient is non-contributory due to uncooperation and alternating agitation and somnolence. HPI is written with review of previous medical records, ED records, discussed with patient's mother via telephone, who was able to provide limited insight into the patient's acute medical condition. Patient's mother states that patient's PO intake has decreased but he would not cooperate with her nor would he tell her what was bothering him. Patient reported to want to stay in bed for the last 2 days.     Of note, patient was recently discharged from [facility] following treatment of DKA and UTI (largely pan-sensitive Proteus mirabilis). In ED, patient was extremely agitated, given multiple doses of Ativan due to agitation, aggressive/abusive statements towards nursing and medical staff. Tachycardic to 110s, hypertensive, febrile to 101.5. Given broad spectrum Abx (Vanc/Zosyn). PEC'ed by ED due to extreme agitation.      Overview/Hospital Course:  Patient admitted for altered mental status. Suspect it was secondary to UTI and COVID infection. UDS negative. Patient was PEC'd while in the ED for agitation. Patients mental status improving. Will consult psychiatry if patient needs psych placement.       Interval History: NAEON. Not agitated and able to reply to questions and follow commands.  Reports improving abdominal pain and denies other symptoms. Pending psychiatry consult.    Review of Systems   Constitutional:  Positive for fatigue. Negative for fever.   Respiratory:  Negative for apnea, cough, chest tightness and shortness of breath.    Gastrointestinal:  Positive for abdominal pain. Negative for constipation and diarrhea.   Genitourinary:  Negative for dysuria.   Psychiatric/Behavioral:  Positive for agitation.      Objective:   [unfilled]Weight: 63.5 kg (140 lb)  Body mass index is 18.99 kg/m².  No intake or output data in the 24 hours ending 03/06/24 1401  Physical Exam  Constitutional:       General: He is not in acute distress.     Appearance: He is not diaphoretic.      Interventions: He is restrained.      Comments: disheveled   HENT:      Head: Normocephalic and atraumatic.   Eyes:      Extraocular Movements:      Right eye: Abnormal extraocular motion present.      Left eye: Abnormal extraocular motion present.      Comments: Impaired left conjugate gaze   Cardiovascular:      Rate and Rhythm: Normal rate and regular rhythm.      Pulses: Normal pulses.      Heart sounds: Normal heart sounds.   Pulmonary:      Effort: Pulmonary effort is normal.      Breath sounds: Rales present.   Abdominal:      General: Bowel sounds are normal. There is no distension.      Palpations: Abdomen is soft.      Tenderness: There is abdominal tenderness in the right lower quadrant, suprapubic area and left lower quadrant. There is no guarding or rebound.   Musculoskeletal:      Right lower leg: No edema.      Left lower leg: No edema.   Skin:     General: Skin is warm and dry.   Neurological:      Mental Status: He is alert. He is disoriented.      GCS: GCS eye subscore is 4. GCS verbal subscore is 4. GCS motor subscore is 6.      Motor: Weakness present.      Comments: Weak  strength when asked to squeeze fingers         Significant Labs: All pertinent labs within the past 24 hours have been  reviewed.  CBC:   Recent Labs   Lab 03/05/24  0542 03/06/24  0312   WBC 5.00 3.28*   HGB 12.2* 10.8*   HCT 39.6* 33.9*    190     CMP:   Recent Labs   Lab 03/05/24  0542 03/06/24  0312    138   K 4.6 3.9    101   CO2 25 27   * 189*   BUN 16 22*   CREATININE 1.1 1.0   CALCIUM 9.1 8.8   PROT 7.5 6.6   ALBUMIN 3.2* 2.8*   BILITOT 0.3 0.2   ALKPHOS 106 92   AST 17 15   ALT 18 16   ANIONGAP 10 10     Magnesium:   Recent Labs   Lab 03/05/24  0542 03/06/24  0312   MG 1.8 1.9     Phosphorus:  Recent Labs   Lab 03/05/24  0542 03/06/24  0312   PHOS 3.3 3.7     POCT Glucose:   Recent Labs   Lab 03/05/24  1506 03/06/24  0647 03/06/24  1101   POCTGLUCOSE 408* 221* 359*       Significant Imaging: I have reviewed all pertinent imaging results/findings within the past 24 hours.      Assessment/Plan:      * Acute encephalopathy  Likely 2/2 to drug use, but underlying COVID+ infection and possible UTI may be contributing factors. Utox negative, but reported history of recent methamphetamine use.     -- Patient continues to be agitated and verbally abusive to medical / nursing staff  -- ED PEC'ed patient prior to admission to . On my encounter, patient is uncooperative with encounter and therefore capacity is unable to be ascertained at this point  -- Capacity evaluation QD  -- PRN Zyprexa for non-redirectable agitation / violence  -- Treat underlying infection  -- improving mental status this AM  -- pending psychiatry consult    Constipation  CTAP wc s/o moderate stool burden, diverticula w/o infection.     -- Miralax q6h when awake  -- senna-docusate BID      COVID  Patient presenting with incidental COVID+ 03/04/2024. No symptoms of dyspnea, shortness of breath. Presenting for unspecified weakness. No hypoxemia.  COVID-19 testing:   Isolation: Airborne/Droplet. Surgical mask on patient. Notify Infection Control    -- No O2 requirement at this time  -- COVID risk score: 5; will initiate High-Risk  Remdesivir 3 day course  -- No role for Dexamethasone d/t normal SpO2 on RA  -- No role for CAP coverage d/t lack of focal consolidation on CXR  -- No role for therapeutic AC d/t normal SpO2 on RA  -- Acetaminophen 650mg PO Q6hr PRN fever/headache  -- IVF if indicated, restrictive strategy preferred, no maintenance IV if able  -- antitussives: dextromethorphan/guaifenesin, tessalon PRN  -- albuterol: 2-4 puff q6h if patient wheezing  -- afebrile with no respiratory complaints this AM      Acute cystitis without hematuria  UA with Nitrite+. Unable to discern symptoms d/t poor cooperation during ROS. Febrile to 101.5, although this may be related to COVID+. Previous UCX with largely pan-sensitive Proteus mirabilis.    -- No WBC or bacteria in UA makes acute UTI less certain; however, given Nitrite+ and new fever, will treat as UTI.  -- CTX 1 g x 5 days    Bipolar disorder  -- Continue home medication regimen: carbamazepime, venlafaxine, risperidone        Type 1 diabetes mellitus with hyperglycemia  Patient's FSGs are uncontrolled due to hyperglycemia on current medication regimen. Current infection also possibly contributing to hyperglycemia.    Last A1c reviewed-   Lab Results   Component Value Date    HGBA1C 11.3 (H) 12/10/2023     Most recent fingerstick glucose reviewed-   Recent Labs   Lab 03/05/24  1506 03/06/24  0647 03/06/24  1101   POCTGLUCOSE 408* 221* 359*       Current correctional scale  Low    Antihyperglycemics (From admission, onward)      Start     Stop Route Frequency Ordered    03/05/24 1645  insulin aspart U-100 pen 6 Units         -- SubQ 3 times daily with meals 03/05/24 1247    03/05/24 0949  insulin detemir U-100 (Levemir) pen 18 Units         -- SubQ Nightly 03/05/24 0949            -- Hold oral hyperglycemics  -- SSI, POCT BG qACHS; titrate basal/bolus regimen PRN to 140-180 goal  -- Diabetic diet    CAD (coronary artery disease)  Hyperlipidemia    Known CAD. Patient not exhibiting ACS  symptoms at this time.     Last known [facility] done 09/2023:  1.  60% in stent restenosis of previously placed right coronary artery stent.  2.  Successful balloon angioplasty with a 3 0 x 20 fall by 3.5 x 20 noncompliant balloon.  60% stenosis reduced to 0%.  YENI 3 flow.  No dissection.  3.  50% stenosis noted in the mid and distal left anterior descending artery.  There is a 70 % stenosis at the apex, small caliber.    -- Continue HI Statin  -- Continue ASA 81  -- Hold Plavix as patient's >1 year from last PCI    COPD (chronic obstructive pulmonary disease)  Patient's COPD is controlled currently.  Patient is currently off COPD Pathway. Continue scheduled inhalers and monitor respiratory status closely.     Polysubstance use disorder  History of recent methamphetamine, tobacco, alcohol use.     -- Likely contributor to psychiatric disorder and agitation  -- psych consult    Hemiparesis affecting right side as late effect of stroke  Noted per history    Anemia of chronic disease  Patient's anemia is currently controlled. Has not received any PRBCs to date. Etiology likely d/t chronic disease due to recurrent infections and inflammation.  Current CBC reviewed-   Lab Results   Component Value Date    HGB 10.8 (L) 03/06/2024    HCT 33.9 (L) 03/06/2024     Monitor serial CBC and transfuse if patient becomes hemodynamically unstable, symptomatic or H/H drops below 7/21.    Primary hypertension  Chronic, controlled. Latest blood pressure and vitals reviewed-     Temp:  [97.5 °F (36.4 °C)-98.4 °F (36.9 °C)]   Pulse:  []   Resp:  [16-18]   BP: (135-172)/(79-94)   SpO2:  [93 %-95 %] .   Home meds for hypertension were reviewed and noted below.   Hypertension Medications               losartan (COZAAR) 50 MG tablet Take 1 tablet (50 mg total) by mouth once daily.    metoprolol succinate (TOPROL-XL) 25 MG 24 hr tablet Take 1 tablet (25 mg total) by mouth once daily.          -- Restart losartan while IP; titrate up prior to  restarting home rate-predominant BB       VTE Risk Mitigation (From admission, onward)           Ordered     enoxaparin injection 40 mg  Daily         03/04/24 1904     IP VTE HIGH RISK PATIENT  Once         03/04/24 1904     Place sequential compression device  Until discontinued         03/04/24 1904                    Discharge Planning   ROLA: 3/8/2024     Code Status: Full Code   Is the patient medically ready for discharge?: No    Reason for patient still in hospital (select all that apply): Patient trending condition, Treatment, and Consult recommendations               Christopher Ramirez, MS4  Department of Hospital Medicine   [facility]

## 2024-03-06 NOTE — ASSESSMENT & PLAN NOTE
Patient's anemia is currently controlled. Has not received any PRBCs to date. Etiology likely d/t chronic disease due to recurrent infections, inflammation  Current CBC reviewed-   Lab Results   Component Value Date    HGB 10.8 (L) 03/06/2024    HCT 33.9 (L) 03/06/2024     Monitor serial CBC and transfuse if patient becomes hemodynamically unstable, symptomatic or H/H drops below 7/21.

## 2024-03-06 NOTE — ASSESSMENT & PLAN NOTE
Likely 2/2 to drug use, but underlying COVID+ infection and possible UTI may be contributing factors. Utox negative, but reported history of recent methamphetamine use.     -- Patient continues to be agitated and verbally abusive to medical / nursing staff  -- ED PEC'ed patient prior to admission to . On my encounter, patient is uncooperative with encounter and therefore capacity is unable to be ascertained at this point  -- Capacity evaluation QD  -- PRN Zyprexa for non-redirectable agitation / violence  -- Treat underlying infection  -- See Bipolar Disorder A/P for home medications  --improving

## 2024-03-06 NOTE — NURSING
Pt. mother phoned nurse and wanted to speak to pt. Pt.'s mother became verbally aggressive with nurse regarding not receiving a call from nurse last night despite speaking with the pt earlier yesterday upon pt.'s request. Tech explained to pt.'s mother that he is currently sleeping. Pt.'s mother states that she would like to speak with her son when he awakes.

## 2024-03-06 NOTE — SUBJECTIVE & OBJECTIVE
Interval History: NAEON. Patient with better mentation currently. Still PEC'd. Will consult psychiatry. Continue treatment of COVID and UTI.    Review of Systems   Unable to perform ROS: Psychiatric disorder   Constitutional:  Positive for fatigue.     Objective:     Vital Signs (Most Recent):  Temp: 97.5 °F (36.4 °C) (03/06/24 1052)  Pulse: 86 (03/06/24 1052)  Resp: 16 (03/06/24 1052)  BP: (!) 172/79 (03/06/24 1052)  SpO2: 95 % (03/06/24 1052) Vital Signs (24h Range):  Temp:  [97.5 °F (36.4 °C)-98.4 °F (36.9 °C)] 97.5 °F (36.4 °C)  Pulse:  [] 86  Resp:  [16-18] 16  SpO2:  [93 %-95 %] 95 %  BP: (135-172)/(79-94) 172/79     Weight: 63.5 kg (140 lb)  Body mass index is 18.99 kg/m².  No intake or output data in the 24 hours ending 03/06/24 1346      Physical Exam  Constitutional:       Comments: Disheveled    HENT:      Head: Normocephalic.   Eyes:      General: No scleral icterus.     Extraocular Movements: Extraocular movements intact.   Cardiovascular:      Rate and Rhythm: Normal rate and regular rhythm.   Pulmonary:      Effort: No respiratory distress.      Breath sounds: Normal breath sounds. No wheezing or rales.   Abdominal:      General: There is no distension.      Palpations: Abdomen is soft.   Musculoskeletal:      Cervical back: Normal range of motion.   Skin:     General: Skin is warm and dry.      Findings: No rash.   Neurological:      Mental Status: He is alert.   Psychiatric:         Mood and Affect: Affect is labile.         Speech: Speech is delayed.         Behavior: Behavior is slowed.         Cognition and Memory: Cognition is impaired.      Comments: Oriented to person and place. Uncooperative with questioning and physical exam. Verbally abusive.             Significant Labs: All pertinent labs within the past 24 hours have been reviewed.  CBC:   Recent Labs   Lab 03/05/24  0542 03/06/24  0312   WBC 5.00 3.28*   HGB 12.2* 10.8*   HCT 39.6* 33.9*    190     CMP:   Recent Labs   Lab  03/05/24  0542 03/06/24  0312    138   K 4.6 3.9    101   CO2 25 27   * 189*   BUN 16 22*   CREATININE 1.1 1.0   CALCIUM 9.1 8.8   PROT 7.5 6.6   ALBUMIN 3.2* 2.8*   BILITOT 0.3 0.2   ALKPHOS 106 92   AST 17 15   ALT 18 16   ANIONGAP 10 10       Significant Imaging: I have reviewed all pertinent imaging results/findings within the past 24 hours.

## 2024-03-06 NOTE — PLAN OF CARE
Problem: Infection  Goal: Absence of Infection Signs and Symptoms  Outcome: Ongoing, Progressing     Problem: Adult Inpatient Plan of Care  Goal: Plan of Care Review  Outcome: Ongoing, Progressing  Goal: Patient-Specific Goal (Individualized)  Outcome: Ongoing, Progressing  Goal: Absence of Hospital-Acquired Illness or Injury  Outcome: Ongoing, Progressing  Goal: Optimal Comfort and Wellbeing  Outcome: Ongoing, Progressing  Goal: Readiness for Transition of Care  Outcome: Ongoing, Progressing     Problem: Violence Risk or Actual  Goal: Anger and Impulse Control  Outcome: Ongoing, Progressing     Problem: Diabetes Comorbidity  Goal: Blood Glucose Level Within Targeted Range  Outcome: Ongoing, Progressing     Problem: Impaired Wound Healing  Goal: Optimal Wound Healing  Outcome: Ongoing, Progressing     Problem: Skin Injury Risk Increased  Goal: Skin Health and Integrity  Outcome: Ongoing, Progressing     Problem: Adjustment to Illness (Sepsis/Septic Shock)  Goal: Optimal Coping  Outcome: Ongoing, Progressing     Problem: Bleeding (Sepsis/Septic Shock)  Goal: Absence of Bleeding  Outcome: Ongoing, Progressing     Problem: Glycemic Control Impaired (Sepsis/Septic Shock)  Goal: Blood Glucose Level Within Desired Range  Outcome: Ongoing, Progressing     Problem: Infection Progression (Sepsis/Septic Shock)  Goal: Absence of Infection Signs and Symptoms  Outcome: Ongoing, Progressing     Problem: Nutrition Impaired (Sepsis/Septic Shock)  Goal: Optimal Nutrition Intake  Outcome: Ongoing, Progressing     Problem: Fall Injury Risk  Goal: Absence of Fall and Fall-Related Injury  Outcome: Ongoing, Progressing     Problem: Restraint, Nonbehavioral (Nonviolent)  Goal: Absence of Harm or Injury  Outcome: Ongoing, Progressing

## 2024-03-06 NOTE — ASSESSMENT & PLAN NOTE
Chronic, controlled. Latest blood pressure and vitals reviewed-     Temp:  [97.5 °F (36.4 °C)-98.4 °F (36.9 °C)]   Pulse:  []   Resp:  [16-18]   BP: (135-172)/(79-94)   SpO2:  [93 %-95 %] .   Home meds for hypertension were reviewed and noted below.   Hypertension Medications               losartan (COZAAR) 50 MG tablet Take 1 tablet (50 mg total) by mouth once daily.    metoprolol succinate (TOPROL-XL) 25 MG 24 hr tablet Take 1 tablet (25 mg total) by mouth once daily.          -- Restart losartan while IP; titrate up prior to restarting home rate-predominant BB

## 2024-03-06 NOTE — ASSESSMENT & PLAN NOTE
History of recent methamphetamine, tobacco, alcohol use.     -- Likely contributor to psychiatric disorder and agitation  -- psych consult

## 2024-03-06 NOTE — ASSESSMENT & PLAN NOTE
Patient's COPD is controlled currently.  Patient is currently off COPD Pathway. Continue scheduled inhalers *** and monitor respiratory status closely.

## 2024-03-06 NOTE — CONSULTS
"CONSULTATION LIAISON PSYCHIATRY INITIAL EVALUATION    Patient Name: Cali Mabry  MRN: [id number]  Patient Class: IP- Inpatient  Admission Date: 3/4/2024  Attending Physician: Doroteo Vyas MD      HPI:   Cali Mabry is a 58 y.o. 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 UDS negative, Carbamazepine level undetectable. PEC placed in ED due to combative behavior and confusion. Patient was found to be COVID+ and have UTI. He was admitted to the hospital for Acute encephalopathy. Patient received Zyprexa 5 mg IM on 3/5 @ 1539.    Psychiatry consulted for "patient with history of Bipolar and substance use disorder, currently PEC'd. Consult for psych evaluation and need for placement in psych facility?"    On psych exam, patient laying in bed with soft restraints in place. Patient is disheveled appearing and oriented to person only. His participation with interview is limited. Thoughts are linear however, he intermittently raises his voice and uses profanities during the interview. He is unable to report why he is at the hospital and how he got here. He reports living with his mother but states that he has not seen or spoken to her in three days. He inquires about discharge and becomes agitated when informed he would likely not be discharged today. He then volitionally does not respond to questions and states "have a good day" to the interview multiple times.     Attempted to interview patient at bedside later with [facility] Team present and patient somnolent and unable to participate in interview.    Per chart review, patient with multiple Tele-Psychiatry consult notes from medical hospitalizations at [facility]. Per documentation, patient with " frequent agitation and previous encephalopathy.    Most recent consult 12/11/23:   Like acute encephalopathy, hypoactive  Major neurocognitive d/o   Intermittent Explosive Disorder secondary to TBI or CVA--appears well controlled w current regimen     Meds: Risperdal 1 mg BID, Tegretol  mg daily and 400 mg nightly, Effexor XR 75 mg daily    It is documented that the patient previously was seen by [facility].    Collateral with patient's permission:   Patient's mother, Marilou, provided the phone number [phone]. Listed as [phone] in the chart.    Medical Review of Systems:  A comprehensive review of systems was negative except for: Respiratory: positive for cough    Psychiatric Review of Systems (is patient experiencing or having changes in): Unable to obtain due to patient factors  sleep: unable to assess  appetite: unable to assess  weight: unable to assess  energy/anergy: unable to assess  interest/pleasure/anhedonia: unable to assess  somatic symptoms: unable to assess  libido: unable to assess  anxiety/panic: unable to assess  guilty/hopelessness: unable to assess  concentration: unable to assess  Ila:unable to assess  Psychosis: unable to assess  Trauma: unable to assess  S.I.B.s/risky behavior: unable to assess    The following was obtained from chart review and updated where appropriate:    Past Psychiatric History:  Previous Medication Trials: yes, Tegretol, Effexor, Risperdal, Depakote, Seroquel, Zyprexa, Thorazine, Suboxone, Cymbalta  Previous Psychiatric Hospitalizations: yes, multiple   Previous Suicide Attempts: no  History of Violence: yes  Outpatient Psychiatrist: previously with [facility]  Family Psychiatric History: unable to assess    Substance Abuse History (with emphasis over the last 12 months):  Recreational Drugs:  Hx cocaine, heroin, amphetamines  Use of Alcohol:  yes per chart review  Tobacco Use:yes  Rehab History:yes    Social  "History:  Marital Status: unable to assess  Children: 0  Employment Status/Info:  unemployed  :no  Education: Patient did not go to college but 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 [facility]    Special Ed: unable to assess  Housing Status: with mother  Access to gun: previously no  Psychosocial Stressors: unable to assess  Functioning Relationships: unable to assess    Legal History:  Past Charges/Incarcerations: yes, DUI   Pending charges:unable to assess    Mental Status Exam:  General Appearance: thin and gaunt, disheveled, malodorous, in soft restraints  Behavior: uncooperative, agitated, redirectable  Involuntary Movements and Motor Activity: no abnormal involuntary movements noted; no tics, no tremors, no akathisia, no dystonia, no evidence of tardive dyskinesia; no psychomotor agitation or retardation  Gait and Station: unable to assess - patient lying down or seated  Speech and Language: loud, profane, mumbling  Mood: "fine"  Affect: irritable, angry  Thought Process and Associations: linear, goal-directed  Thought Content and Perceptions:: Unwilling to Participate  Sensorium and Orientation: alert, oriented to person only  Recent and Remote Memory: Unwilling to Participate in Formal Testing  Attention and Concentration: Unwilling to Participate in Formal Testing  Fund of Knowledge: Unwilling to Participate in Formal Testing  Insight: poor awareness of illness  Judgment: poor, behavior is inappropriate to the circumstances, noncompliant with health provider's recommendations and instructions    CAM ICU positive? Unable to assess    ASSESSMENT & RECOMMENDATIONS   Acute Encephalopathy  Intermittent Explosive Disorder  Hx TBI  Hx CVA  Hx Unspecified Neurocognitive Disorder  Hx Bipolar Disorder  Hx MDD      Intermittent Explosive Disorder  Hx Bipolar Disorder  Hx MDD  PSYCH MEDICATIONS  Scheduled -  Continue Risperdal 1 mg PO BID  Continue Tegretol 400 " mg PO nightly  Continue Effexor XR 75 mg PO daily  PRN  Continue Zyprexa 5 mg PO or IM for non-redirectable agitation associated with breakthrough psychosis or vero  Will attempt to obtain collateral from mother as well as [facility] to determine baseline    DELIRIUM  DELIRIUM BEHAVIOR MANAGEMENT  PLEASE utilize CHEMICAL restraints with PRN meds first for agitation. Minimize use of PHYSICAL restraints OR have periods of being out of physical restraints if possible.  Keep window shades open and room lit during day and room dim at night in order to promote normal sleep-wake cycles  Encourage family at bedside. [place] patient often to situation, location, date.  Continue to Limit or Discontinue use of Narcotics, Benzos and Anti-cholinergic medications as they may worsen delirium.  Continue medical workup for causative etiology of Delirium.     RISK ASSESSMENT  NEEDS PEC because patient is in imminent danger of hurting self or others and is gravely disabled. & NEEDS 1:1 sitter    FOLLOW UP  Will follow up while in house    DISPOSITION - once medically cleared:   Will reassess daily    Please contact ON CALL psychiatry service (24/7) for any acute issues that may arise.    Dr. Martha Storey   Psychiatry  [facility]  3/6/2024 12:31 PM        --------------------------------------------------------------------------------------------------------------------------------------------------------------------------------------------------------------------------------------    CONTINUED HISTORY & OBJECTIVE clinical data & findings reviewed and noted for above decision making    Past Medical/Surgical History:   Past Medical History:   Diagnosis Date    Acute on chronic pancreatitis 04/03/2016    Bacterial pneumonia 02/29/2020    Cavitary lung lesions  Parapneumonic effusion/ EMPYEMA    CHF (congestive heart failure)     Closed compression fracture of body of L1 vertebra 05/09/2020     COPD (chronic obstructive pulmonary disease) 08/08/2020    DM (diabetes mellitus), type 1     c/b DKA    HCV (hepatitis C virus)     08/2023 count < 12    HTN (hypertension)     Hx of psychiatric care     Stroke     R sided deficits    Substance abuse     IVDU heroin, methamphetamines     Past Surgical History:   Procedure Laterality Date    CARDIAC SURGERY  1999    stent placed. ([facility])    ESOPHAGOGASTRODUODENOSCOPY N/A 3/5/2020    Gastritis.  No H pylori.  Completed PPI for 1 month.  Procedure: EGD (ESOPHAGOGASTRODUODENOSCOPY);  Surgeon: Brinda Rene MD;  Location: [facility] ENDO;  Service: Endoscopy;  Laterality: N/A;  [id number]; [id number]    LEFT HEART CATHETERIZATION Left 9/28/2020    Procedure: Left heart cath;  Surgeon: Fito Rangel MD;  Location: [facility] CATH LAB;  Service: Cardiology;  Laterality: Left;    SHOULDER SURGERY  2013    right shoulder, spur removal.       Current Medications:   Scheduled Meds:    aspirin  81 mg Oral Daily    atorvastatin  20 mg Oral Daily    carBAMazepine  400 mg Oral Daily    cefTRIAXone (Rocephin) IV (PEDS and ADULTS)  1 g Intravenous Q24H    enoxparin  40 mg Subcutaneous Daily    folic acid  1 mg Oral Daily    insulin aspart U-100  6 Units Subcutaneous TIDWM    insulin detemir U-100 (Levemir)  18 Units Subcutaneous QHS    losartan  50 mg Oral Daily    melatonin  6 mg Oral Nightly    multivitamin  1 tablet Oral Daily    polyethylene glycol  17 g Oral Q6H WAKE    risperiDONE  1 mg Oral BID    senna-docusate 8.6-50 mg  1 tablet Oral BID    thiamine  100 mg Oral Daily    venlafaxine  75 mg Oral Daily     PRN Meds: albuterol, benzonatate, dextromethorphan-guaiFENesin  mg/5 ml, dextrose 10%, dextrose 10%, glucagon (human recombinant), glucose, glucose, labetalol, naloxone, ondansetron, sodium chloride 0.9%    Allergies:   Review of patient's allergies indicates:  No Known Allergies    Vitals  Vitals:    03/06/24 1052   BP: (!) 172/79   Pulse: 86   Resp: 16   Temp: 97.5  °F (36.4 °C)       Labs/Imaging/Studies:  Recent Results (from the past 24 hour(s))   POCT glucose    Collection Time: 03/05/24  3:06 PM   Result Value Ref Range    POCT Glucose 408 (H) 70 - 110 mg/dL   Comprehensive Metabolic Panel (CMP)    Collection Time: 03/06/24  3:12 AM   Result Value Ref Range    Sodium 138 136 - 145 mmol/L    Potassium 3.9 3.5 - 5.1 mmol/L    Chloride 101 95 - 110 mmol/L    CO2 27 23 - 29 mmol/L    Glucose 189 (H) 70 - 110 mg/dL    BUN 22 (H) 6 - 20 mg/dL    Creatinine 1.0 0.5 - 1.4 mg/dL    Calcium 8.8 8.7 - 10.5 mg/dL    Total Protein 6.6 6.0 - 8.4 g/dL    Albumin 2.8 (L) 3.5 - 5.2 g/dL    Total Bilirubin 0.2 0.1 - 1.0 mg/dL    Alkaline Phosphatase 92 55 - 135 U/L    AST 15 10 - 40 U/L    ALT 16 10 - 44 U/L    eGFR >60.0 >60 mL/min/1.73 m^2    Anion Gap 10 8 - 16 mmol/L   Magnesium    Collection Time: 03/06/24  3:12 AM   Result Value Ref Range    Magnesium 1.9 1.6 - 2.6 mg/dL   Phosphorus    Collection Time: 03/06/24  3:12 AM   Result Value Ref Range    Phosphorus 3.7 2.7 - 4.5 mg/dL   CBC with Automated Differential    Collection Time: 03/06/24  3:12 AM   Result Value Ref Range    WBC 3.28 (L) 3.90 - 12.70 K/uL    RBC 4.22 (L) 4.60 - 6.20 M/uL    Hemoglobin 10.8 (L) 14.0 - 18.0 g/dL    Hematocrit 33.9 (L) 40.0 - 54.0 %    MCV 80 (L) 82 - 98 fL    MCH 25.6 (L) 27.0 - 31.0 pg    MCHC 31.9 (L) 32.0 - 36.0 g/dL    RDW 14.2 11.5 - 14.5 %    Platelets 190 150 - 450 K/uL    MPV 9.9 9.2 - 12.9 fL    Immature Granulocytes 0.3 0.0 - 0.5 %    Gran # (ANC) 1.8 1.8 - 7.7 K/uL    Immature Grans (Abs) 0.01 0.00 - 0.04 K/uL    Lymph # 0.7 (L) 1.0 - 4.8 K/uL    Mono # 0.6 0.3 - 1.0 K/uL    Eos # 0.2 0.0 - 0.5 K/uL    Baso # 0.01 0.00 - 0.20 K/uL    nRBC 0 0 /100 WBC    Gran % 54.3 38.0 - 73.0 %    Lymph % 21.6 18.0 - 48.0 %    Mono % 18.9 (H) 4.0 - 15.0 %    Eosinophil % 4.6 0.0 - 8.0 %    Basophil % 0.3 0.0 - 1.9 %    Differential Method Automated    POCT glucose    Collection Time: 03/06/24  6:47 AM    Result Value Ref Range    POCT Glucose 221 (H) 70 - 110 mg/dL   POCT glucose    Collection Time: 03/06/24 11:01 AM   Result Value Ref Range    POCT Glucose 359 (H) 70 - 110 mg/dL     Imaging Results              CT Head Without Contrast (Final result)  Result time 03/04/24 16:55:45      Final result by Jered Cheatham MD (03/04/24 16:55:45)                   Impression:      No evidence of acute hemorrhage or major vascular distribution infarct.    Chronic small vessel ischemic change with remote right occipital and left cerebellar infarcts.    Ventriculomegaly out of proportion to the degree of sulcal enlargement, similar to prior exams.  While this could relate to cerebral volume loss, the configuration does raise the possibility of normal pressure hydrocephalus. Clinical correlation required.      Electronically signed by: Jered Cheatham MD  Date:    03/04/2024  Time:    16:55               Narrative:    EXAMINATION:  CT HEAD WITHOUT CONTRAST    CLINICAL HISTORY:  Mental status change, unknown cause;    TECHNIQUE:  Low dose axial CT images obtained throughout the head without the use of intravenous contrast.  Axial, sagittal and coronal reconstructions were performed.    COMPARISON:  12/10/2023.    FINDINGS:  Intracranial compartment:    Ventricles are enlarged, similar to prior.  Third ventricle diameter again measuring up to 1.4 cm.  Ventricular enlargement out of proportion of degree of sulcal enlargement.    Chronic small vessel ischemic change in the supratentorial white matter.  Remote right occipital infarct.  Remote left cerebellar infarct.  No acute major vascular distribution infarct.  No acute parenchymal hemorrhage.  No new intracranial mass effect or midline shift.    No extra-axial blood or fluid collections.    Skull/extracranial contents (limited evaluation):    No displaced calvarial fracture.    Mastoid air cells are clear. Mild patchy mucosal thickening in the visualized paranasal  sinuses.                                       CT Abdomen Pelvis With IV Contrast NO Oral Contrast (Final result)  Result time 03/04/24 18:05:52      Final result by New Falcon MD (03/04/24 18:05:52)                   Impression:      Moderate colonic stool burden with mild wall thickening of the rectum, correlate with signs of constipation or proctitis.    Diffuse bladder wall thickening, correlate with urinalysis and symptoms of cystitis.    Diverticulosis coli without evidence of acute diverticulitis    Stable 5 mm right middle lobe solid pulmonary micronodule.    Additional findings as above.    Electronically signed by resident: Marsha Cardona  Date:    03/04/2024  Time:    17:27    Electronically signed by: New Falcon MD  Date:    03/04/2024  Time:    18:05               Narrative:    EXAMINATION:  CT ABDOMEN PELVIS WITH IV CONTRAST    CLINICAL HISTORY:  Abdominal abscess/infection suspected;    TECHNIQUE:  Axial images of the abdomen and pelvis were acquired after administration of 75 cc Omnipaque 350 IV contrast. No oral contrast was administered.  Coronal and sagittal reconstructions were also obtained.    COMPARISON:  CT 12/12/2023 and 07/26/2023.    FINDINGS:  Examination degraded by patient motion artifact.    Heart: Normal heart size.  No pericardial fluid.  No significant calcific coronary atherosclerosis.    Lungs: Evaluation of the lung bases limited due to respiratory motion.  Allowing for this, stable linear bandlike opacities of the right lung base suggestive of atelectasis/scarring.  Stable 5 mm right middle lobe solid pulmonary micronodule (2-7).  No new focal consolidation.  No pleural effusion or pneumothorax.    Liver: Normal in size and contour.  No focal hepatic lesion.    Gallbladder: No calcified gallstones.    Bile Ducts: No biliary ductal dilatation.    Pancreas: No mass or peripancreatic fat stranding.    Spleen: Spleen is upper limits of normal in size measuring 12  cm.    Adrenals: No significant abnormalities.    Kidneys/Ureters: Normal in size and location. Normal enhancement. No hydronephrosis or nephrolithiasis. No ureteral dilatation.    Bladder: Diffuse bladder wall thickening.    Reproductive organs: Prostate is unremarkable.    Peritoneum: No free air or free fluid.    Retroperitoneum: No pathologically enlarged retroperitoneal lymph nodes.    Bowel/Mesentery: Small hiatal hernia.  Stomach is otherwise unremarkable.  Small bowel is normal in caliber without evidence of inflammation or obstruction.  Moderate colonic stool burden.  Mild wall thickening of the rectum with large fecal burden, component of proctitis to be considered.  Scattered colonic diverticuli.  Appendix not definitively visualized.  No steve-cecal soft tissue stranding.    Abdominal wall:  Small fat containing umbilical hernia. 4.7 x 1.8 cm fat containing lesion along the left anterolateral chest wall, favored to represent a lipoma.    Vasculature: Mild calcific atherosclerosis of the abdominal aorta and its branches.  No aneurysm.    Bones: Mild degenerative changes spine.  Prominent Schmorl's node at L1, unchanged.  Healing left anterolateral rib fractures.  No acute fractures.  Lucent lesion within the right femoral neck, stable dating back to multiple priors.                                       X-Ray Chest AP Portable (Final result)  Result time 03/04/24 13:37:35      Final result by Cullen Bush MD (03/04/24 13:37:35)                   Impression:      See above      Electronically signed by: Cullen Bush MD  Date:    03/04/2024  Time:    13:37               Narrative:    EXAMINATION:  XR CHEST AP PORTABLE    CLINICAL HISTORY:  Sepsis;    TECHNIQUE:  Single frontal view of the chest was performed.    COMPARISON:  None 12/22/2023    FINDINGS:  Heart size normal.  The lungs are clear.  No pleural effusion

## 2024-03-06 NOTE — HOSPITAL COURSE
Patient admitted for altered mental status. Suspect it was secondary to UTI and COVID infection. UDS negative. Patient was PEC'd while in the ED for agitation. Patients mental status improving. Will consult psychiatry if patient needs psych placement. Finished remdesivir course, not hypoxic. Improving respiratory status. Minor episodes of cough. Reviewed previous A1C from 2 months on 11.3. uptitrated insulin regimen. Psychiatry following, patient with intermittent explosive disorder, and recommend maintaining current regimen, as well as PEC as he is confused. Current presentation consistent with encephalopathy rather than psychosis or vero. Psych not recommending Psych placement. Once he is back to baseline, they will reassess need for psych placement. Titrating blood glucose. Continues on PO cefpodoxime. Neurology consulted for NPH, recommending outpatient neurosurgery evaluation. Episode of hypertension, controlled wit PRN labetalol. Started on PO amlodipine for BP control. Decreased risperidone dose as per psychiatry recs given patient's tone is increased on examination. Hypoglycemic overnight, started on D10 bolus, improved. Will reach out again to family for new collaterals on patient baseline status. Somnolent on examination. Optimizing blood glucose management, PRN zyprexa for agitation only. Increased tegretol dose to 200 AM, 400PM as per psychiatry recommendations. Continue holding risperidone. Patient somnolent. No new episodes of agitation for more than 48hs. Optimizing BG. EEG with isolated discharges R > L without seizure activity. After obtaining further collateral from the family, family reports the patient has been at physical and mental baseline prior to previous admission. On 03/19, patient was discharged home with HH in stable condition.

## 2024-03-06 NOTE — PROGRESS NOTES
[facility] Medicine  Progress Note    Patient Name: Cali Mabry  MRN: [id number]  Patient Class: IP- Inpatient   Admission Date: 3/4/2024  Length of Stay: 2 days  Attending Physician: Doroteo Vyas MD  Primary Care Provider: Lawanda Flores NP        Subjective:     Principal Problem:Acute encephalopathy        HPI:  58 M with DM1, extensive IVDU, HCV Ab+, HTN, previous CVA with R sided deficits, HFpEF, CAD, chronic pancreatitis, mixed mood / psychiatric disorder presenting [place] [facility] [place] with chief complaint of weakness of unspecified duration and onset. Admits to using shooting methamphetamines. Rest of history from patient is non-contributory due to uncooperation and alternating agitation and somnolence. HPI is written with review of previous medical records, ED records, discussed with patient's mother via telephone, who was able to provide limited insight into the patient's acute medical condition. Patient's mother states that patient's PO intake has decreased but he would not cooperate with her nor would he tell her what was bothering him. Patient reported to want to stay in bed for the last 2 days.     Of note, patient was recently discharged from [facility] following treatment of DKA and UTI (largely pan-sensitive Proteus mirabilis). In ED, patient was extremely agitated, given multiple doses of Ativan due to agitation, aggressive/abusive statements towards nursing and medical staff. Tachycardic to 110s, hypertensive, febrile to 101.5. Given broad spectrum Abx (Vanc/Zosyn). PEC'ed by ED due to extreme agitation.    Overview/Hospital Course:  Patient admitted for altered mental status. Suspect it was secondary to UTI and COVID infection. UDS negative. Patient was PEC'd while in the ED for agitation. Patients mental status improving. Will consult psychiatry if patient needs psych placement.     Interval History: NAEON. Patient with better mentation currently. Still PEC'd. Will consult  psychiatry. Continue treatment of COVID and UTI.    Review of Systems   Unable to perform ROS: Psychiatric disorder   Constitutional:  Positive for fatigue.     Objective:     Vital Signs (Most Recent):  Temp: 97.5 °F (36.4 °C) (03/06/24 1052)  Pulse: 86 (03/06/24 1052)  Resp: 16 (03/06/24 1052)  BP: (!) 172/79 (03/06/24 1052)  SpO2: 95 % (03/06/24 1052) Vital Signs (24h Range):  Temp:  [97.5 °F (36.4 °C)-98.4 °F (36.9 °C)] 97.5 °F (36.4 °C)  Pulse:  [] 86  Resp:  [16-18] 16  SpO2:  [93 %-95 %] 95 %  BP: (135-172)/(79-94) 172/79     Weight: 63.5 kg (140 lb)  Body mass index is 18.99 kg/m².  No intake or output data in the 24 hours ending 03/06/24 1346      Physical Exam  Constitutional:       Comments: Disheveled    HENT:      Head: Normocephalic.   Eyes:      General: No scleral icterus.     Extraocular Movements: Extraocular movements intact.   Cardiovascular:      Rate and Rhythm: Normal rate and regular rhythm.   Pulmonary:      Effort: No respiratory distress.      Breath sounds: Normal breath sounds. No wheezing or rales.   Abdominal:      General: There is no distension.      Palpations: Abdomen is soft.   Musculoskeletal:      Cervical back: Normal range of motion.   Skin:     General: Skin is warm and dry.      Findings: No rash.   Neurological:      Mental Status: He is alert.   Psychiatric:         Mood and Affect: Affect is labile.         Speech: Speech is delayed.         Behavior: Behavior is slowed.         Cognition and Memory: Cognition is impaired.      Comments: Oriented to person and place. Uncooperative with questioning and physical exam. Verbally abusive.             Significant Labs: All pertinent labs within the past 24 hours have been reviewed.  CBC:   Recent Labs   Lab 03/05/24 0542 03/06/24 0312   WBC 5.00 3.28*   HGB 12.2* 10.8*   HCT 39.6* 33.9*    190     CMP:   Recent Labs   Lab 03/05/24 0542 03/06/24 0312    138   K 4.6 3.9    101   CO2 25 27   *  189*   BUN 16 22*   CREATININE 1.1 1.0   CALCIUM 9.1 8.8   PROT 7.5 6.6   ALBUMIN 3.2* 2.8*   BILITOT 0.3 0.2   ALKPHOS 106 92   AST 17 15   ALT 18 16   ANIONGAP 10 10       Significant Imaging: I have reviewed all pertinent imaging results/findings within the past 24 hours.    Assessment/Plan:      * Acute encephalopathy  Likely 2/2 to drug use, but underlying COVID+ infection and possible UTI may be contributing factors. Utox negative, but reported history of recent methamphetamine use.     -- Patient continues to be agitated and verbally abusive to medical / nursing staff  -- ED PEC'ed patient prior to admission to . On my encounter, patient is uncooperative with encounter and therefore capacity is unable to be ascertained at this point  -- Capacity evaluation QD  -- PRN Zyprexa for non-redirectable agitation / violence  -- Treat underlying infection  -- See Bipolar Disorder A/P for home medications  --improving    Constipation  CTAP wc s/o moderate stool burden, diverticula w/o infection.     -- Aggressive bowel regimen      COVID  Patient presenting with incidental COVID+ 03/04/2024. No symptoms of dyspnea, shortness of breath. Presenting for unspecified weakness. No hypoxemia.  COVID-19 testing:   Isolation: Airborne/Droplet. Surgical mask on patient. Notify Infection Control    -- No O2 requirement at this time  -- COVID risk score: 5; will initiate High-Risk Remdesivir 3 day course  -- No role for Dexamethasone d/t normal SpO2 on RA  -- No role for CAP coverage d/t lack of focal consolidation on CXR  -- No role for therapeutic AC d/t normal SpO2 on RA  -- Acetaminophen 650mg PO Q6hr PRN fever/headache  -- IVF if indicated, restrictive strategy preferred, no maintenance IV if able  -- antitussives: dextromethorphan/guaifenesin, tessalon PRN  -- albuterol: 2-4 puff q6h if patient wheezing        Acute cystitis without hematuria  UA with Nitrite+. Unable to discern symptoms d/t poor cooperation during ROS.  Febrile to 101.5, although this may be related to COVID+. Previous UCX with largely pan-sensitive Proteus mirabilis.    -- No WBC or bacteria in UA makes acute UTI less certain; however, given Nitrite+ and new fever, will treat as UTI.  -- CTX 1 g x 5 days    Bipolar disorder  -- Continue home medication regimen: carbamazepime, venlafaxine, risperidone        Type 1 diabetes mellitus with hyperglycemia  Patient's FSGs are uncontrolled due to hyperglycemia on current medication regimen.  Last A1c reviewed-   Lab Results   Component Value Date    HGBA1C 11.3 (H) 12/10/2023     Most recent fingerstick glucose reviewed-   Recent Labs   Lab 03/05/24  1506 03/06/24  0647 03/06/24  1101   POCTGLUCOSE 408* 221* 359*       Current correctional scale  Low    Antihyperglycemics (From admission, onward)      Start     Stop Route Frequency Ordered    03/05/24 1645  insulin aspart U-100 pen 6 Units         -- SubQ 3 times daily with meals 03/05/24 1247    03/05/24 0949  insulin detemir U-100 (Levemir) pen 18 Units         -- SubQ Nightly 03/05/24 0949            -- Hold oral hyperglycemics  -- SSI, POCT BG qACHS; titrate basal/bolus regimen PRN to 140-180 goal  -- Diabetic diet    CAD (coronary artery disease)  Hyperlipidemia    Known CAD. Patient not exhibiting ACS symptoms at this time.     Last known LHC done 09/2023:  1.  60% in stent restenosis of previously placed right coronary artery stent.  2.  Successful balloon angioplasty with a 3 0 x 20 fall by 3.5 x 20 noncompliant balloon.  60% stenosis reduced to 0%.  YENI 3 flow.  No dissection.  3.  50% stenosis noted in the mid and distal left anterior descending artery.  There is a 70 % stenosis at the apex, small caliber.    -- Continue HI Statin  -- Continue ASA 81  -- Hold Plavix as patient's > 1 year from last PCI    COPD (chronic obstructive pulmonary disease)  Patient's COPD is controlled currently.  Patient is currently off COPD Pathway. Continue scheduled inhalers as  needed and monitor respiratory status closely.     -not having active COPD exacerbation.    Polysubstance use disorder  History of recent methamphetamine, tobacco, alcohol use.     -- Likely contributor to psychiatric disorder and agitation  -- psych consult    Hemiparesis affecting right side as late effect of stroke  Noted per history    Anemia of chronic disease  Patient's anemia is currently controlled. Has not received any PRBCs to date. Etiology likely d/t chronic disease due to recurrent infections, inflammation  Current CBC reviewed-   Lab Results   Component Value Date    HGB 10.8 (L) 03/06/2024    HCT 33.9 (L) 03/06/2024     Monitor serial CBC and transfuse if patient becomes hemodynamically unstable, symptomatic or H/H drops below 7/21.    Primary hypertension  Chronic, controlled. Latest blood pressure and vitals reviewed-     Temp:  [97.5 °F (36.4 °C)-98.4 °F (36.9 °C)]   Pulse:  []   Resp:  [16-18]   BP: (135-172)/(79-94)   SpO2:  [93 %-95 %] .   Home meds for hypertension were reviewed and noted below.   Hypertension Medications               losartan (COZAAR) 50 MG tablet Take 1 tablet (50 mg total) by mouth once daily.    metoprolol succinate (TOPROL-XL) 25 MG 24 hr tablet Take 1 tablet (25 mg total) by mouth once daily.          -- Restart losartan while IP; titrate up prior to restarting home rate-predominant BB       VTE Risk Mitigation (From admission, onward)           Ordered     enoxaparin injection 40 mg  Daily         03/04/24 1904     IP VTE HIGH RISK PATIENT  Once         03/04/24 1904     Place sequential compression device  Until discontinued         03/04/24 1904                    Discharge Planning   ROLA: 3/8/2024     Code Status: Full Code   Is the patient medically ready for discharge?: No    Reason for patient still in hospital (select all that apply): Patient trending condition, Laboratory test, Pending disposition, and Other (specify) Funmi Sanderson  MD [place]  Department of Hospital Medicine   [facility]

## 2024-03-06 NOTE — ASSESSMENT & PLAN NOTE
Patient's FSGs are uncontrolled due to hyperglycemia on current medication regimen.  Last A1c reviewed-   Lab Results   Component Value Date    HGBA1C 11.3 (H) 12/10/2023     Most recent fingerstick glucose reviewed-   Recent Labs   Lab 03/05/24  1506 03/06/24  0647 03/06/24  1101   POCTGLUCOSE 408* 221* 359*       Current correctional scale  Low    Antihyperglycemics (From admission, onward)    Start     Stop Route Frequency Ordered    03/05/24 1645  insulin aspart U-100 pen 6 Units         -- SubQ 3 times daily with meals 03/05/24 1247    03/05/24 0949  insulin detemir U-100 (Levemir) pen 18 Units         -- SubQ Nightly 03/05/24 0949          -- Hold oral hyperglycemics  -- SSI, POCT BG qACHS; titrate basal/bolus regimen PRN to 140-180 goal  -- Diabetic diet

## 2024-03-06 NOTE — PLAN OF CARE
Went to patient's room to conduct discharge assessment and patient was asleep in bed with bilateral restraints in place and sitter in the room.       PHU Kim  Case Management  [phone]

## 2024-03-06 NOTE — MED STUDENT ASSESSMENT & PLAN
* Acute encephalopathy  Likely 2/2 to drug use, but underlying COVID+ infection and possible UTI may be contributing factors. Utox negative, but reported history of recent methamphetamine use.     -- Patient's mental status improving  -- ED PEC'ed patient prior to admission to .  -- Capacity evaluation QD  -- PRN Zyprexa for non-redirectable agitation / violence  -- Treat underlying infection  -- See Bipolar Disorder A/P for home medications  -- Psychiatry following    Constipation  CTAP wc s/o moderate stool burden, diverticula w/o infection.     -- Miralax q6h when awake  -- senna-docusate BID      COVID  Patient presenting with incidental COVID+ 03/04/2024. No symptoms of dyspnea, shortness of breath. Presenting for unspecified weakness. No hypoxemia.  COVID-19 testing:   Isolation: Airborne/Droplet. Surgical mask on patient. Notify Infection Control    -- No O2 requirement at this time  -- COVID risk score: 5  -- Remdesivir 3 day course completed  -- No role for Dexamethasone d/t normal SpO2 on RA  -- No role for CAP coverage d/t lack of focal consolidation on CXR  -- No role for therapeutic AC d/t normal SpO2 on RA  -- Acetaminophen 650mg PO Q6hr PRN fever/headache  -- IVF if indicated, restrictive strategy preferred, no maintenance IV if able  -- antitussives: dextromethorphan/guaifenesin, tessalon PRN  -- albuterol: 2-4 puff q6h if patient wheezing  -- afebrile with no respiratory complaints this AM      Acute cystitis without hematuria  UA with Nitrite+. Unable to discern symptoms d/t poor cooperation during ROS. Febrile to 101.5, although this may be related to COVID+. Previous UCX with largely pan-sensitive Proteus mirabilis.    -- No WBC or bacteria in UA makes acute UTI less certain; however, given nitrite+ and new fever, will treat as UTI.  -- continue PO cefpodoxime    Bipolar disorder  -- Continue home medication regimen: carbamazepime, venlafaxine, risperidone      Type 1 diabetes mellitus with  hyperglycemia  Patient's FSGs are uncontrolled due to hyperglycemia on current medication regimen. Current infection also possibly contributing to hyperglycemia.  Last A1c reviewed-   Lab Results   Component Value Date    HGBA1C 11.3 (H) 12/10/2023     Most recent fingerstick glucose reviewed-   Recent Labs   Lab 03/05/24  1506 03/06/24  0647 03/06/24  1101   POCTGLUCOSE 408* 221* 359*       Current correctional scale  Low    Antihyperglycemics (From admission, onward)      Start     Stop Route Frequency Ordered    03/07/24 2100  insulin detemir U-100 (Levemir) pen 20 Units         -- SubQ Nightly 03/07/24 1316    03/07/24 1649  insulin aspart U-100 pen 0-5 Units         -- SubQ Before meals & nightly PRN 03/07/24 1550    03/05/24 1645  insulin aspart U-100 pen 6 Units         -- SubQ 3 times daily with meals 03/05/24 1247             -- Hold oral hyperglycemics  -- SSI, POCT BG qACHS; titrate basal/bolus regimen PRN to 140-180 goal  -- Diabetic diet  -- patient non-compliant with insulin regimen at home      CAD (coronary artery disease)  Hyperlipidemia    Known CAD. Patient not exhibiting ACS symptoms at this time.     Last known LHC done 09/2023:  1.  60% in stent restenosis of previously placed right coronary artery stent.  2.  Successful balloon angioplasty with a 3 0 x 20 fall by 3.5 x 20 noncompliant balloon.  60% stenosis reduced to 0%.  YENI 3 flow.  No dissection.  3.  50% stenosis noted in the mid and distal left anterior descending artery.  There is a 70 % stenosis at the apex, small caliber.    -- atorvastatin 20  -- ASA 81  -- Hold Plavix as patient's >1 year from last PCI    COPD (chronic obstructive pulmonary disease)  Patient's COPD is controlled currently.  Patient is currently off COPD Pathway. Continue scheduled inhalers and monitor respiratory status closely.     Polysubstance use disorder  History of recent methamphetamine, tobacco, alcohol use.     -- Likely contributor to psychiatric disorder  and agitation  -- psych following    Hemiparesis affecting right side as late effect of stroke  Noted per history    Anemia of chronic disease  Patient's anemia is currently controlled. Has not received any PRBCs to date. Etiology likely d/t chronic disease due to recurrent infections and inflammation.  Current CBC reviewed-   Lab Results   Component Value Date    HGB 10.8 (L) 03/06/2024    HCT 33.9 (L) 03/06/2024     Monitor serial CBC and transfuse if patient becomes hemodynamically unstable, symptomatic or H/H drops below 7/21.    Primary hypertension  Chronic, controlled. Latest blood pressure and vitals reviewed-     Temp:  [97.5 °F (36.4 °C)-98.4 °F (36.9 °C)]   Pulse:  []   Resp:  [16-18]   BP: (135-172)/(79-94)   SpO2:  [93 %-95 %] .   Home meds for hypertension were reviewed and noted below.   Hypertension Medications               losartan (COZAAR) 50 MG tablet Take 1 tablet (50 mg total) by mouth once daily.    metoprolol succinate (TOPROL-XL) 25 MG 24 hr tablet Take 1 tablet (25 mg total) by mouth once daily.            -- losartan 50

## 2024-03-07 PROBLEM — F63.81 INTERMITTENT EXPLOSIVE DISORDER: Status: ACTIVE | Noted: 2023-08-19

## 2024-03-07 LAB
ALBUMIN SERPL BCP-MCNC: 3 G/DL (ref 3.5–5.2)
ALP SERPL-CCNC: 98 U/L (ref 55–135)
ALT SERPL W/O P-5'-P-CCNC: 20 U/L (ref 10–44)
ANION GAP SERPL CALC-SCNC: 8 MMOL/L (ref 8–16)
AST SERPL-CCNC: 20 U/L (ref 10–40)
BASOPHILS # BLD AUTO: 0.01 K/UL (ref 0–0.2)
BASOPHILS NFR BLD: 0.3 % (ref 0–1.9)
BILIRUB SERPL-MCNC: 0.2 MG/DL (ref 0.1–1)
BUN SERPL-MCNC: 17 MG/DL (ref 6–20)
CALCIUM SERPL-MCNC: 9 MG/DL (ref 8.7–10.5)
CHLORIDE SERPL-SCNC: 101 MMOL/L (ref 95–110)
CO2 SERPL-SCNC: 26 MMOL/L (ref 23–29)
CREAT SERPL-MCNC: 0.8 MG/DL (ref 0.5–1.4)
DIFFERENTIAL METHOD BLD: ABNORMAL
EOSINOPHIL # BLD AUTO: 0.2 K/UL (ref 0–0.5)
EOSINOPHIL NFR BLD: 6.8 % (ref 0–8)
ERYTHROCYTE [DISTWIDTH] IN BLOOD BY AUTOMATED COUNT: 14 % (ref 11.5–14.5)
EST. GFR  (NO RACE VARIABLE): >60 ML/MIN/1.73 M^2
ESTIMATED AVG GLUCOSE: 243 MG/DL (ref 68–131)
GLUCOSE SERPL-MCNC: 171 MG/DL (ref 70–110)
HBA1C MFR BLD: 10.1 % (ref 4–5.6)
HCT VFR BLD AUTO: 38.7 % (ref 40–54)
HGB BLD-MCNC: 12.2 G/DL (ref 14–18)
IMM GRANULOCYTES # BLD AUTO: 0.01 K/UL (ref 0–0.04)
IMM GRANULOCYTES NFR BLD AUTO: 0.3 % (ref 0–0.5)
LYMPHOCYTES # BLD AUTO: 0.8 K/UL (ref 1–4.8)
LYMPHOCYTES NFR BLD: 24.8 % (ref 18–48)
MAGNESIUM SERPL-MCNC: 1.9 MG/DL (ref 1.6–2.6)
MCH RBC QN AUTO: 25.7 PG (ref 27–31)
MCHC RBC AUTO-ENTMCNC: 31.5 G/DL (ref 32–36)
MCV RBC AUTO: 82 FL (ref 82–98)
MONOCYTES # BLD AUTO: 0.4 K/UL (ref 0.3–1)
MONOCYTES NFR BLD: 13 % (ref 4–15)
NEUTROPHILS # BLD AUTO: 1.8 K/UL (ref 1.8–7.7)
NEUTROPHILS NFR BLD: 54.8 % (ref 38–73)
NRBC BLD-RTO: 0 /100 WBC
PHOSPHATE SERPL-MCNC: 2.7 MG/DL (ref 2.7–4.5)
PLATELET # BLD AUTO: 205 K/UL (ref 150–450)
PMV BLD AUTO: 10.4 FL (ref 9.2–12.9)
POCT GLUCOSE: 114 MG/DL (ref 70–110)
POCT GLUCOSE: 148 MG/DL (ref 70–110)
POCT GLUCOSE: 171 MG/DL (ref 70–110)
POCT GLUCOSE: 272 MG/DL (ref 70–110)
POTASSIUM SERPL-SCNC: 3.8 MMOL/L (ref 3.5–5.1)
PROT SERPL-MCNC: 7 G/DL (ref 6–8.4)
RBC # BLD AUTO: 4.74 M/UL (ref 4.6–6.2)
SODIUM SERPL-SCNC: 135 MMOL/L (ref 136–145)
WBC # BLD AUTO: 3.23 K/UL (ref 3.9–12.7)

## 2024-03-07 PROCEDURE — 21400001 HC TELEMETRY ROOM

## 2024-03-07 PROCEDURE — 25000003 PHARM REV CODE 250

## 2024-03-07 PROCEDURE — 83036 HEMOGLOBIN GLYCOSYLATED A1C: CPT

## 2024-03-07 PROCEDURE — 27000207 HC ISOLATION

## 2024-03-07 PROCEDURE — 63600175 PHARM REV CODE 636 W HCPCS

## 2024-03-07 PROCEDURE — 36415 COLL VENOUS BLD VENIPUNCTURE: CPT | Mod: XB

## 2024-03-07 PROCEDURE — 85025 COMPLETE CBC W/AUTO DIFF WBC: CPT

## 2024-03-07 PROCEDURE — 25000003 PHARM REV CODE 250: Performed by: HOSPITALIST

## 2024-03-07 PROCEDURE — 83735 ASSAY OF MAGNESIUM: CPT

## 2024-03-07 PROCEDURE — 80053 COMPREHEN METABOLIC PANEL: CPT

## 2024-03-07 PROCEDURE — 63600175 PHARM REV CODE 636 W HCPCS: Mod: JZ,JG

## 2024-03-07 PROCEDURE — 36415 COLL VENOUS BLD VENIPUNCTURE: CPT

## 2024-03-07 PROCEDURE — 84100 ASSAY OF PHOSPHORUS: CPT

## 2024-03-07 RX ORDER — CEFPODOXIME PROXETIL 200 MG/1
200 TABLET, FILM COATED ORAL EVERY 12 HOURS
Status: DISCONTINUED | OUTPATIENT
Start: 2024-03-07 | End: 2024-03-10

## 2024-03-07 RX ORDER — INSULIN ASPART 100 [IU]/ML
0-5 INJECTION, SOLUTION INTRAVENOUS; SUBCUTANEOUS
Status: DISCONTINUED | OUTPATIENT
Start: 2024-03-07 | End: 2024-03-19 | Stop reason: HOSPADM

## 2024-03-07 RX ORDER — OLANZAPINE 10 MG/2ML
5 INJECTION, POWDER, FOR SOLUTION INTRAMUSCULAR ONCE AS NEEDED
Status: COMPLETED | OUTPATIENT
Start: 2024-03-07 | End: 2024-03-07

## 2024-03-07 RX ADMIN — RISPERIDONE 1 MG: 1 TABLET, ORALLY DISINTEGRATING ORAL at 08:03

## 2024-03-07 RX ADMIN — RISPERIDONE 1 MG: 1 TABLET, ORALLY DISINTEGRATING ORAL at 10:03

## 2024-03-07 RX ADMIN — SENNOSIDES AND DOCUSATE SODIUM 1 TABLET: 8.6; 5 TABLET ORAL at 10:03

## 2024-03-07 RX ADMIN — SENNOSIDES AND DOCUSATE SODIUM 1 TABLET: 8.6; 5 TABLET ORAL at 08:03

## 2024-03-07 RX ADMIN — ASPIRIN 81 MG CHEWABLE TABLET 81 MG: 81 TABLET CHEWABLE at 10:03

## 2024-03-07 RX ADMIN — INSULIN ASPART 6 UNITS: 100 INJECTION, SOLUTION INTRAVENOUS; SUBCUTANEOUS at 08:03

## 2024-03-07 RX ADMIN — Medication 100 MG: at 10:03

## 2024-03-07 RX ADMIN — LOSARTAN POTASSIUM 50 MG: 50 TABLET, FILM COATED ORAL at 10:03

## 2024-03-07 RX ADMIN — Medication 6 MG: at 08:03

## 2024-03-07 RX ADMIN — VENLAFAXINE HYDROCHLORIDE 75 MG: 75 CAPSULE, EXTENDED RELEASE ORAL at 10:03

## 2024-03-07 RX ADMIN — INSULIN ASPART 6 UNITS: 100 INJECTION, SOLUTION INTRAVENOUS; SUBCUTANEOUS at 05:03

## 2024-03-07 RX ADMIN — OLANZAPINE 5 MG: 10 INJECTION, POWDER, FOR SOLUTION INTRAMUSCULAR at 03:03

## 2024-03-07 RX ADMIN — FOLIC ACID 1 MG: 1 TABLET ORAL at 10:03

## 2024-03-07 RX ADMIN — CEFPODOXIME PROXETIL 200 MG: 200 TABLET, FILM COATED ORAL at 08:03

## 2024-03-07 RX ADMIN — ATORVASTATIN CALCIUM 20 MG: 20 TABLET, FILM COATED ORAL at 10:03

## 2024-03-07 RX ADMIN — POLYETHYLENE GLYCOL 3350 17 G: 17 POWDER, FOR SOLUTION ORAL at 09:03

## 2024-03-07 RX ADMIN — THERA TABS 1 TABLET: TAB at 10:03

## 2024-03-07 RX ADMIN — CARBAMAZEPINE 400 MG: 200 TABLET, EXTENDED RELEASE ORAL at 09:03

## 2024-03-07 RX ADMIN — INSULIN ASPART 6 UNITS: 100 INJECTION, SOLUTION INTRAVENOUS; SUBCUTANEOUS at 12:03

## 2024-03-07 RX ADMIN — ENOXAPARIN SODIUM 40 MG: 40 INJECTION SUBCUTANEOUS at 05:03

## 2024-03-07 NOTE — ASSESSMENT & PLAN NOTE
Likely 2/2 to drug use, but underlying COVID+ infection and possible UTI may be contributing factors. Utox negative, but reported history of recent methamphetamine use.     -- Patient improving on mental status   -- ED PEC'ed patient prior to admission to .   -- Capacity evaluation QD  -- PRN Zyprexa for non-redirectable agitation / violence  -- Treat underlying infection  -- See Bipolar Disorder A/P for home medications

## 2024-03-07 NOTE — ASSESSMENT & PLAN NOTE
Psychiatry following, patient with intermittent explosive disorder, and recommend maintaining current regimen, as well as PEC as he is confused. Current presentation consistent with encephalopathy rather than psychosis or vero. Psych not recommending Psych placement. Once he is back to baseline, they will reassess need for psych placement.

## 2024-03-07 NOTE — ASSESSMENT & PLAN NOTE
Patient's anemia is currently controlled. Has not received any PRBCs to date. Etiology likely d/t chronic disease due to recurrent infections, inflammation  Current CBC reviewed-   Lab Results   Component Value Date    HGB 12.2 (L) 03/07/2024    HCT 38.7 (L) 03/07/2024     Monitor serial CBC and transfuse if patient becomes hemodynamically unstable, symptomatic or H/H drops below 7/21.

## 2024-03-07 NOTE — SUBJECTIVE & OBJECTIVE
Interval History: optimizing insulin regimen (high A1C from 2 months ago). Patient more oriented, however not at baseline. Psych onboard to continue same management and PEC. Not candidate for psych placement until returns to baseline.     Review of Systems   Unable to perform ROS: Psychiatric disorder     Objective:     Vital Signs (Most Recent):  Temp: 98 °F (36.7 °C) (03/07/24 1220)  Pulse: 95 (03/07/24 1220)  Resp: 16 (03/07/24 1220)  BP: (!) 161/81 (03/07/24 1220)  SpO2: 96 % (03/07/24 1220) Vital Signs (24h Range):  Temp:  [97.6 °F (36.4 °C)-98 °F (36.7 °C)] 98 °F (36.7 °C)  Pulse:  [71-95] 95  Resp:  [16-18] 16  SpO2:  [95 %-97 %] 96 %  BP: (137-176)/(74-88) 161/81     Weight: 63.5 kg (140 lb)  Body mass index is 18.99 kg/m².  No intake or output data in the 24 hours ending 03/07/24 1335      Physical Exam  Constitutional:       Comments: Disheveled    HENT:      Head: Normocephalic.   Eyes:      General: No scleral icterus.     Extraocular Movements: Extraocular movements intact.   Cardiovascular:      Rate and Rhythm: Normal rate and regular rhythm.   Pulmonary:      Effort: No respiratory distress.      Breath sounds: Normal breath sounds. No wheezing or rales.   Abdominal:      General: There is no distension.      Palpations: Abdomen is soft.   Musculoskeletal:      Cervical back: Normal range of motion.   Skin:     General: Skin is warm and dry.      Findings: No rash.   Neurological:      Mental Status: He is alert.   Psychiatric:         Mood and Affect: Affect is labile.         Speech: Speech is delayed.         Behavior: Behavior is slowed.         Cognition and Memory: Cognition is impaired.      Comments: Oriented to person and place. Uncooperative with questioning and physical exam. Verbally abusive.             Significant Labs: All pertinent labs within the past 24 hours have been reviewed.    Significant Imaging: I have reviewed all pertinent imaging results/findings within the past 24  hours.

## 2024-03-07 NOTE — ASSESSMENT & PLAN NOTE
History of recent methamphetamine, tobacco, alcohol use. Likely contributor to psychiatric disorder and agitation

## 2024-03-07 NOTE — ASSESSMENT & PLAN NOTE
Currently not having sepsis criteria. Positive nitrates on admission. On 5 day course antibiotic.     - switched to PO cefpodoxime (end date: 3/8/24)

## 2024-03-07 NOTE — ASSESSMENT & PLAN NOTE
UA with Nitrite+. Unable to discern symptoms d/t poor cooperation during ROS. Febrile to 101.5, although this may be related to COVID+. Previous UCX with largely pan-sensitive Proteus mirabilis.    -- No WBC or bacteria in UA makes acute UTI less certain; however, given Nitrite+ and new fever, will treat as UTI.  - switched to PO cefpodoxime

## 2024-03-07 NOTE — ASSESSMENT & PLAN NOTE
Patient's FSGs are uncontrolled due to hyperglycemia on current medication regimen.  Last A1c reviewed-   Lab Results   Component Value Date    HGBA1C 11.3 (H) 12/10/2023     Most recent fingerstick glucose reviewed-   Recent Labs   Lab 03/06/24  1610 03/06/24 2003 03/07/24  0751 03/07/24  1109   POCTGLUCOSE 320* 261* 148* 272*       Current correctional scale  Low    Antihyperglycemics (From admission, onward)      Start     Stop Route Frequency Ordered    03/07/24 2100  insulin detemir U-100 (Levemir) pen 20 Units         -- SubQ Nightly 03/07/24 1316    03/05/24 1645  insulin aspart U-100 pen 6 Units         -- SubQ 3 times daily with meals 03/05/24 1247            -- Hold oral hyperglycemics  -- SSI, POCT BG qACHS; titrate basal/bolus regimen PRN to 140-180 goal  -- Diabetic diet  - Detemir 20U / Aspart 6U  - Reviewed previous A1C on 11.3  - patient non-compliant with insulin regimen at home

## 2024-03-07 NOTE — PLAN OF CARE
[facility]  Initial Discharge Assessment       Primary Care Provider: Lawanda Flores NP    Admission Diagnosis: Metabolic encephalopathy [G93.41]  Weakness [R53.1]  Tachycardia [R00.0]  Chest pain [R07.9]  Urinary tract infection without hematuria, site unspecified [N39.0]  Constipation, unspecified constipation type [K59.00]  Altered mental status, unspecified altered mental status type [R41.82]  COVID [U07.1]    Admission Date: 3/4/2024  Expected Discharge Date: 3/8/2024    Transition of Care Barriers: (P) None    Payor: [facility] MCARE / Plan: [facility] DUAL COMPLETE HMO SNP / Product Type: Medicare Advantage /     Extended Emergency Contact Information  Primary Emergency Contact: Marilou coles  Home Phone: [phone]  Relation: Mother  Preferred language: English   needed? No  Secondary Emergency Contact: Amarilis Easley   [place]  Mobile Phone: [phone]  Relation: Sister    Discharge Plan A: (P) Psychiatric hospital  Discharge Plan B: (P) Home with family      [id number] [place] - [address]  [address]  [place] [address]  Phone: [phone] Fax: [phone]    [facility] [address]  [address]   [place] [address]  Phone: [phone] Fax: [phone]      CM met with patient to discuss discharge planning. Patient lives with Marilou coles (mother) [phone]  in a single story home with no steps to enter. Prior to hospital admission, patient was independent with ADLs and would periodically use a straight cane. Will continue to follow for post acute needs. Discharge Plan A and Plan B have been determined by review of patient's clinical status, future medical and therapeutic needs, and coverage/benefits for post-acute care in coordination with multidisciplinary team members.  Initial Assessment (most recent)       Adult Discharge Assessment - 03/07/24 1221           Discharge Assessment    Assessment Type Discharge Planning Assessment     Confirmed/corrected address, phone number and insurance Yes     Confirmed Demographics Correct on Facesheet     Source of Information patient     Communicated ROLA with patient/caregiver Date not available/Unable to determine     Reason For Admission Acute Encephalopathy     People in Home parent(s)     Facility Arrived From: Home (P)      Do you expect to return to your current living situation? No (P)      Do you have help at home or someone to help you manage your care at home? No (P)      Prior to hospitilization cognitive status: Alert/Oriented (P)      Current cognitive status: Alert/Oriented (P)      Walking or Climbing Stairs Difficulty yes (P)      Walking or Climbing Stairs ambulation difficulty, requires equipment (P)      Mobility Management cane (P)      Dressing/Bathing Difficulty no (P)      Home Accessibility wheelchair accessible (P)      Equipment Currently Used at Home cane, straight (P)      Readmission within 30 days? No (P)      Patient currently being followed by outpatient case management? No (P)      Do you currently have service(s) that help you manage your care at home? No (P)      Do you take prescription medications? Yes (P)      Do you have prescription coverage? Yes (P)      Coverage [facility] Mc[facility] (P)      Do you have any problems affording any of your prescribed medications? TBD (P)      Is the patient taking medications as prescribed? yes (P)      Who is going to help you get home at discharge? Marilou coles (mother) [phone] (P)      How do you get to doctors appointments? car, drives self (P)      Are you on dialysis? No (P)      Do you take coumadin? No (P)      Discharge Plan A Psychiatric hospital (P)      Discharge Plan B Home with family (P)      DME Needed Upon Discharge  none (P)      Discharge Plan discussed with: Patient (P)      Transition of Care Barriers None (P)         Physical Activity     On average, how many days per week do you engage in moderate to strenuous exercise (like a brisk walk)? 0 days (P)      On average, how many minutes do you engage in exercise at this level? 0 min (P)         Financial Resource Strain    How hard is it for you to pay for the very basics like food, housing, medical care, and heating? Not very hard (P)         Housing Stability    In the last 12 months, was there a time when you were not able to pay the mortgage or rent on time? No (P)      In the last 12 months, how many places have you lived? 1 (P)      In the last 12 months, was there a time when you did not have a steady place to sleep or slept in a shelter (including now)? No (P)         Transportation Needs    In the past 12 months, has lack of transportation kept you from medical appointments or from getting medications? No (P)      In the past 12 months, has lack of transportation kept you from meetings, work, or from getting things needed for daily living? No (P)         Food Insecurity    Within the past 12 months, you worried that your food would run out before you got the money to buy more. Never true (P)      Within the past 12 months, the food you bought just didn't last and you didn't have money to get more. Never true (P)         Stress    Do you feel stress - tense, restless, nervous, or anxious, or unable to sleep at night because your mind is troubled all the time - these days? Not at all (P)         Social Connections    In a typical week, how many times do you talk on the phone with family, friends, or neighbors? More than three times a week (P)      How often do you get together with friends or relatives? More than three times a week (P)      How often do you attend Oriental orthodox or Denominational services? Never (P)      Do you belong to any clubs or organizations such as Oriental orthodox groups, unions, fraternal or athletic groups, or school groups? No (P)      How often do you attend meetings of the clubs or  organizations you belong to? Never (P)      Are you , , , , never , or living with a partner? Never  (P)         Alcohol Use    Q1: How often do you have a drink containing alcohol? Never (P)      Q2: How many drinks containing alcohol do you have on a typical day when you are drinking? Patient does not drink (P)      Q3: How often do you have six or more drinks on one occasion? Never (P)         OTHER    Name(s) of People in Home Marilou coles (mother) [phone] (P)                         Suresh Lipscomb RNCM  Case Management  [phone]

## 2024-03-07 NOTE — PROGRESS NOTES
"CONSULTATION LIAISON PSYCHIATRY PROGRESS NOTE    Patient Name: Cali Mabry  MRN: [id number]  Patient Class: IP- Inpatient  Admission Date: 3/4/2024  Attending Physician: Siddhartha Zavala MD      SUBJECTIVE:   Cali Mabry is a 58 y.o. 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 UDS negative, Carbamazepine level undetectable. PEC placed in ED due to combative behavior and confusion. Patient was found to be COVID+ and have UTI. He was admitted to the hospital for Acute encephalopathy. Patient received Zyprexa 5 mg IM on 3/5 @ 1539.    Psychiatry consulted for "patient with history of Bipolar and substance use disorder, currently PEC'd. Consult for psych evaluation and need for placement in psych facility?"     Patient did not receive any psychotropic PRN medications overnight. Compliant with PO medications.    Today, patient seen at bedside. He is awake and alert. He is calm and pleasant throughout interview. He is oriented to person only and reports he has no idea how he got to the hospital upon re-orientation. He states "I am still confused" when informed of reported confusion prompting admission. He denies any physical complaints at this time but is observed with productive cough. He reports his mood as "alright" and denies depressed or elevated mood. At one point during the interview, this interview accidentally moved the table at bedside and the patient inquired if this was the interviewer's doing or "a ghost." On questioning he denies seeing a ghost in the room and reports "I am not hallucinating." He denies paranoia. Without prompting, he denies SI or HI. Multiple times during the interview, he requests to speak to his mother to have her " "pick him up. Patient informed multiple times that he is not being discharged and needs to remain in the hospital. Patient calm and cooperative upon receiving this information multiple times. He terminates the interview and thanks this interviewer.    Collateral:  Spoke with patient's mother and family friend who reportedly sees the patient daily. Reports the patient does live with his mother. Reports the patient is oriented at baseline. Reports the patient is wheelchair bound for the past couple of months due to gait instability/weakness and wears diapers as he cannot ambulate to the bathroom. Reports prior to this it is unclear if the patient experienced urinary incontinence. Reports the patient takes some home medications but unclear about Psychotropic medications, only able to list insulin. Reports the patient does not see a Psychiatrist in the outpatient setting. Reports the patient does get agitated and verbally aggressive at home but denies any combative or violent behavior.    Provided the phone number for the patient's sister, Amarilis, for additional information [phone].     OBJECTIVE:    Mental Status Exam:  General Appearance: dressed in hospital garb, in no acute distress, thin and gaunt, disheveled, malodorous  Behavior: normal, cooperative, friendly, pleasant, polite, under good behavioral control  Involuntary Movements and Motor Activity: no abnormal involuntary movements noted; no tics, no tremors, no akathisia, no dystonia, no evidence of tardive dyskinesia; no psychomotor agitation or retardation  Gait and Station: unable to assess - patient lying down or seated  Speech and Language: intact; normal rate, rhythm, volume, tone, and pitch; conversational, spontaneous, and coherent; speaks and understands English proficiently and fluently; repeats words and phrases, no word finding difficulties are noted  Mood: "good"  Affect: normal, euthymic, reactive, full-range, mood-congruent, appropriate to " situation and context  Thought Process and Associations: ruminative  Thought Content and Perceptions:: no suicidal or homicidal ideation, no auditory or visual hallucinations, no paranoid ideation, no ideas of reference, no evidence of delusions or psychosis  Sensorium and Orientation: oriented to person only  Recent and Remote Memory: remote memory intact, recent memory impaired, Unable to  Formally Assess  Attention and Concentration: easily distractible, Unable to Formally Assess  Fund of Knowledge: unable to identify the   Insight: limited, limited/partial awareness of illness  Judgment: behavior is adequate/appropriate to circumstances, improving    CAM ICU positive? Unable to formally assess      ASSESSMENT & RECOMMENDATIONS   Acute Encephalopathy   R/O NPH  Intermittent Explosive Disorder  Hx TBI  Hx CVA  Hx Unspecified Neurocognitive Disorder  Hx Bipolar Disorder  Hx MDD        Intermittent Explosive Disorder  Hx Bipolar Disorder  Hx MDD  PSYCH MEDICATIONS  Scheduled -  Continue Risperdal 1 mg PO BID  Continue Tegretol 400 mg PO nightly  Continue Effexor XR 75 mg PO daily  PRN  Continue Zyprexa 5 mg PO or IM for non-redirectable agitation associated with breakthrough psychosis or vero  Will attempt to obtain collateral from mother as well as Family Preservation ACT Team to determine baseline     DELIRIUM  Recommend Neurology Consult for NPH R/O   Given acute mental status change, gait instability per collateral ~2-3 months, possible urinary incontinence and CT Head results from 3/4 with ventriculomegaly out of proportion to the degree of sulcal enlargement  DELIRIUM BEHAVIOR MANAGEMENT  PLEASE utilize CHEMICAL restraints with PRN meds first for agitation. Minimize use of PHYSICAL restraints OR have periods of being out of physical restraints if possible.  Keep window shades open and room lit during day and room dim at night in order to promote normal sleep-wake  cycles  Encourage family at bedside. [place] patient often to situation, location, date.  Continue to Limit or Discontinue use of Narcotics, Benzos and Anti-cholinergic medications as they may worsen delirium.  Continue medical workup for causative etiology of Delirium.      RISK ASSESSMENT  NEEDS PEC because patient is in imminent danger of hurting self or others and is gravely disabled. & NEEDS 1:1 sitter     FOLLOW UP  Will follow up while in house     DISPOSITION - once medically cleared:   Will reassess daily    Please contact ON CALL psychiatry service (24/7) for any acute issues that may arise.    Dr. Martha Storey   Psychiatry  [facility]  3/7/2024 8:27 AM        --------------------------------------------------------------------------------------------------------------------------------------------------------------------------------------------------------------------------------------    CONTINUED OBJECTIVE clinical data & findings reviewed and noted for above decision making    Current Medications:   Scheduled Meds:    aspirin  81 mg Oral Daily    atorvastatin  20 mg Oral Daily    carBAMazepine  400 mg Oral Daily    cefTRIAXone (Rocephin) IV (PEDS and ADULTS)  1 g Intravenous Q24H    enoxparin  40 mg Subcutaneous Daily    folic acid  1 mg Oral Daily    insulin aspart U-100  6 Units Subcutaneous TIDWM    insulin detemir U-100 (Levemir)  18 Units Subcutaneous QHS    losartan  50 mg Oral Daily    melatonin  6 mg Oral Nightly    multivitamin  1 tablet Oral Daily    polyethylene glycol  17 g Oral Q6H WAKE    risperiDONE  1 mg Oral BID    senna-docusate 8.6-50 mg  1 tablet Oral BID    thiamine  100 mg Oral Daily    venlafaxine  75 mg Oral Daily     PRN Meds: albuterol, benzonatate, dextromethorphan-guaiFENesin  mg/5 ml, dextrose 10%, dextrose 10%, glucagon (human recombinant), glucose, glucose, labetalol, naloxone, ondansetron, sodium chloride 0.9%    Allergies:   Review of  patient's allergies indicates:  No Known Allergies    Vitals  Vitals:    03/07/24 0358   BP: (!) 141/74   Pulse: 71   Resp: 18   Temp:        Labs/Imaging/Studies:  Recent Results (from the past 24 hour(s))   POCT glucose    Collection Time: 03/06/24 11:01 AM   Result Value Ref Range    POCT Glucose 359 (H) 70 - 110 mg/dL   POCT glucose    Collection Time: 03/06/24  4:10 PM   Result Value Ref Range    POCT Glucose 320 (H) 70 - 110 mg/dL   POCT glucose    Collection Time: 03/06/24  8:03 PM   Result Value Ref Range    POCT Glucose 261 (H) 70 - 110 mg/dL   Comprehensive Metabolic Panel (CMP)    Collection Time: 03/07/24  5:23 AM   Result Value Ref Range    Sodium 135 (L) 136 - 145 mmol/L    Potassium 3.8 3.5 - 5.1 mmol/L    Chloride 101 95 - 110 mmol/L    CO2 26 23 - 29 mmol/L    Glucose 171 (H) 70 - 110 mg/dL    BUN 17 6 - 20 mg/dL    Creatinine 0.8 0.5 - 1.4 mg/dL    Calcium 9.0 8.7 - 10.5 mg/dL    Total Protein 7.0 6.0 - 8.4 g/dL    Albumin 3.0 (L) 3.5 - 5.2 g/dL    Total Bilirubin 0.2 0.1 - 1.0 mg/dL    Alkaline Phosphatase 98 55 - 135 U/L    AST 20 10 - 40 U/L    ALT 20 10 - 44 U/L    eGFR >60.0 >60 mL/min/1.73 m^2    Anion Gap 8 8 - 16 mmol/L   Magnesium    Collection Time: 03/07/24  5:23 AM   Result Value Ref Range    Magnesium 1.9 1.6 - 2.6 mg/dL   Phosphorus    Collection Time: 03/07/24  5:23 AM   Result Value Ref Range    Phosphorus 2.7 2.7 - 4.5 mg/dL   CBC with Automated Differential    Collection Time: 03/07/24  5:23 AM   Result Value Ref Range    WBC 3.23 (L) 3.90 - 12.70 K/uL    RBC 4.74 4.60 - 6.20 M/uL    Hemoglobin 12.2 (L) 14.0 - 18.0 g/dL    Hematocrit 38.7 (L) 40.0 - 54.0 %    MCV 82 82 - 98 fL    MCH 25.7 (L) 27.0 - 31.0 pg    MCHC 31.5 (L) 32.0 - 36.0 g/dL    RDW 14.0 11.5 - 14.5 %    Platelets 205 150 - 450 K/uL    MPV 10.4 9.2 - 12.9 fL    Immature Granulocytes 0.3 0.0 - 0.5 %    Gran # (ANC) 1.8 1.8 - 7.7 K/uL    Immature Grans (Abs) 0.01 0.00 - 0.04 K/uL    Lymph # 0.8 (L) 1.0 - 4.8 K/uL     Mono # 0.4 0.3 - 1.0 K/uL    Eos # 0.2 0.0 - 0.5 K/uL    Baso # 0.01 0.00 - 0.20 K/uL    nRBC 0 0 /100 WBC    Gran % 54.8 38.0 - 73.0 %    Lymph % 24.8 18.0 - 48.0 %    Mono % 13.0 4.0 - 15.0 %    Eosinophil % 6.8 0.0 - 8.0 %    Basophil % 0.3 0.0 - 1.9 %    Differential Method Automated    POCT glucose    Collection Time: 03/07/24  7:51 AM   Result Value Ref Range    POCT Glucose 148 (H) 70 - 110 mg/dL     Imaging Results              CT Head Without Contrast (Final result)  Result time 03/04/24 16:55:45      Final result by Jered Cheatham MD (03/04/24 16:55:45)                   Impression:      No evidence of acute hemorrhage or major vascular distribution infarct.    Chronic small vessel ischemic change with remote right occipital and left cerebellar infarcts.    Ventriculomegaly out of proportion to the degree of sulcal enlargement, similar to prior exams.  While this could relate to cerebral volume loss, the configuration does raise the possibility of normal pressure hydrocephalus. Clinical correlation required.      Electronically signed by: Jered Cheatham MD  Date:    03/04/2024  Time:    16:55               Narrative:    EXAMINATION:  CT HEAD WITHOUT CONTRAST    CLINICAL HISTORY:  Mental status change, unknown cause;    TECHNIQUE:  Low dose axial CT images obtained throughout the head without the use of intravenous contrast.  Axial, sagittal and coronal reconstructions were performed.    COMPARISON:  12/10/2023.    FINDINGS:  Intracranial compartment:    Ventricles are enlarged, similar to prior.  Third ventricle diameter again measuring up to 1.4 cm.  Ventricular enlargement out of proportion of degree of sulcal enlargement.    Chronic small vessel ischemic change in the supratentorial white matter.  Remote right occipital infarct.  Remote left cerebellar infarct.  No acute major vascular distribution infarct.  No acute parenchymal hemorrhage.  No new intracranial mass effect or midline shift.    No  extra-axial blood or fluid collections.    Skull/extracranial contents (limited evaluation):    No displaced calvarial fracture.    Mastoid air cells are clear. Mild patchy mucosal thickening in the visualized paranasal sinuses.                                       CT Abdomen Pelvis With IV Contrast NO Oral Contrast (Final result)  Result time 03/04/24 18:05:52      Final result by New Falcon MD (03/04/24 18:05:52)                   Impression:      Moderate colonic stool burden with mild wall thickening of the rectum, correlate with signs of constipation or proctitis.    Diffuse bladder wall thickening, correlate with urinalysis and symptoms of cystitis.    Diverticulosis coli without evidence of acute diverticulitis    Stable 5 mm right middle lobe solid pulmonary micronodule.    Additional findings as above.    Electronically signed by resident: Marsha Cardona  Date:    03/04/2024  Time:    17:27    Electronically signed by: New Falcon MD  Date:    03/04/2024  Time:    18:05               Narrative:    EXAMINATION:  CT ABDOMEN PELVIS WITH IV CONTRAST    CLINICAL HISTORY:  Abdominal abscess/infection suspected;    TECHNIQUE:  Axial images of the abdomen and pelvis were acquired after administration of 75 cc Omnipaque 350 IV contrast. No oral contrast was administered.  Coronal and sagittal reconstructions were also obtained.    COMPARISON:  CT 12/12/2023 and 07/26/2023.    FINDINGS:  Examination degraded by patient motion artifact.    Heart: Normal heart size.  No pericardial fluid.  No significant calcific coronary atherosclerosis.    Lungs: Evaluation of the lung bases limited due to respiratory motion.  Allowing for this, stable linear bandlike opacities of the right lung base suggestive of atelectasis/scarring.  Stable 5 mm right middle lobe solid pulmonary micronodule (2-7).  No new focal consolidation.  No pleural effusion or pneumothorax.    Liver: Normal in size and contour.  No focal hepatic  lesion.    Gallbladder: No calcified gallstones.    Bile Ducts: No biliary ductal dilatation.    Pancreas: No mass or peripancreatic fat stranding.    Spleen: Spleen is upper limits of normal in size measuring 12 cm.    Adrenals: No significant abnormalities.    Kidneys/Ureters: Normal in size and location. Normal enhancement. No hydronephrosis or nephrolithiasis. No ureteral dilatation.    Bladder: Diffuse bladder wall thickening.    Reproductive organs: Prostate is unremarkable.    Peritoneum: No free air or free fluid.    Retroperitoneum: No pathologically enlarged retroperitoneal lymph nodes.    Bowel/Mesentery: Small hiatal hernia.  Stomach is otherwise unremarkable.  Small bowel is normal in caliber without evidence of inflammation or obstruction.  Moderate colonic stool burden.  Mild wall thickening of the rectum with large fecal burden, component of proctitis to be considered.  Scattered colonic diverticuli.  Appendix not definitively visualized.  No steve-cecal soft tissue stranding.    Abdominal wall:  Small fat containing umbilical hernia. 4.7 x 1.8 cm fat containing lesion along the left anterolateral chest wall, favored to represent a lipoma.    Vasculature: Mild calcific atherosclerosis of the abdominal aorta and its branches.  No aneurysm.    Bones: Mild degenerative changes spine.  Prominent Schmorl's node at L1, unchanged.  Healing left anterolateral rib fractures.  No acute fractures.  Lucent lesion within the right femoral neck, stable dating back to multiple priors.                                       X-Ray Chest AP Portable (Final result)  Result time 03/04/24 13:37:35      Final result by Cullen Bush MD (03/04/24 13:37:35)                   Impression:      See above      Electronically signed by: Cullen Bush MD  Date:    03/04/2024  Time:    13:37               Narrative:    EXAMINATION:  XR CHEST AP PORTABLE    CLINICAL HISTORY:  Sepsis;    TECHNIQUE:  Single frontal view of the chest  was performed.    COMPARISON:  None 12/22/2023    FINDINGS:  Heart size normal.  The lungs are clear.  No pleural effusion

## 2024-03-07 NOTE — ASSESSMENT & PLAN NOTE
Patient presenting with incidental COVID+ 03/04/2024. No symptoms of dyspnea, shortness of breath. Presenting for unspecified weakness. No hypoxemia.  COVID-19 testing:   Isolation: Airborne/Droplet. Surgical mask on patient. Notify Infection Control. Finished remdesivir management. No steroids given not hypoxemic.     -- No O2 requirement at this time  -- COVID risk score: 5; will initiate High-Risk Remdesivir 3 day course  -- No role for Dexamethasone d/t normal SpO2 on RA  -- No role for CAP coverage d/t lack of focal consolidation on CXR  -- No role for therapeutic AC d/t normal SpO2 on RA  -- Acetaminophen 650mg PO Q6hr PRN fever/headache  -- IVF if indicated, restrictive strategy preferred, no maintenance IV if able  -- antitussives: dextromethorphan/guaifenesin, tessalon PRN  -- albuterol: 2-4 puff q6h if patient wheezing

## 2024-03-07 NOTE — PROGRESS NOTES
[facility] Medicine  Progress Note    Patient Name: Cali Mabry  MRN: [id number]  Patient Class: IP- Inpatient   Admission Date: 3/4/2024  Length of Stay: 3 days  Attending Physician: Siddhartha Zavala MD  Primary Care Provider: Lawanda Flores NP        Subjective:     Principal Problem:Acute encephalopathy        HPI:  58 M with DM1, extensive IVDU, HCV Ab+, HTN, previous CVA with R sided deficits, HFpEF, CAD, chronic pancreatitis, mixed mood / psychiatric disorder presenting to [facility] ED with chief complaint of weakness of unspecified duration and onset. Admits to using shooting methamphetamines. Rest of history from patient is non-contributory due to uncooperation and alternating agitation and somnolence. HPI is written with review of previous medical records, ED records, discussed with patient's mother via telephone, who was able to provide limited insight into the patient's acute medical condition. Patient's mother states that patient's PO intake has decreased but he would not cooperate with her nor would he tell her what was bothering him. Patient reported to want to stay in bed for the last 2 days.     Of note, patient was recently discharged from [facility] following treatment of DKA and UTI (largely pan-sensitive Proteus mirabilis). In ED, patient was extremely agitated, given multiple doses of Ativan due to agitation, aggressive/abusive statements towards nursing and medical staff. Tachycardic to 110s, hypertensive, febrile to 101.5. Given broad spectrum Abx (Vanc/Zosyn). PEC'ed by ED due to extreme agitation.    Overview/Hospital Course:  Patient admitted for altered mental status. Suspect it was secondary to UTI and COVID infection. UDS negative. Patient was PEC'd while in the ED for agitation. Patients mental status improving. Will consult psychiatry if patient needs psych placement. Finished remdesivir course, not hypoxic. Improving respiratory status. Minor episodes of cough.  Reviewed previous A1C from 2 months on 11.3. uptitrated insulin regimen. Psychiatry following, patient with intermittent explosive disorder, and recommend maintaining current regimen, as well as PEC as he is confused. Current presentation consistent with encephalopathy rather than psychosis or vero. Psych not recommending Psych placement. Once he is back to baseline, they will reassess need for psych placement.     Interval History: optimizing insulin regimen (high A1C from 2 months ago). Patient more oriented, however not at baseline. Psych onboard to continue same management and PEC. Not candidate for psych placement until returns to baseline.     Review of Systems   Unable to perform ROS: Psychiatric disorder     Objective:     Vital Signs (Most Recent):  Temp: 98 °F (36.7 °C) (03/07/24 1220)  Pulse: 95 (03/07/24 1220)  Resp: 16 (03/07/24 1220)  BP: (!) 161/81 (03/07/24 1220)  SpO2: 96 % (03/07/24 1220) Vital Signs (24h Range):  Temp:  [97.6 °F (36.4 °C)-98 °F (36.7 °C)] 98 °F (36.7 °C)  Pulse:  [71-95] 95  Resp:  [16-18] 16  SpO2:  [95 %-97 %] 96 %  BP: (137-176)/(74-88) 161/81     Weight: 63.5 kg (140 lb)  Body mass index is 18.99 kg/m².  No intake or output data in the 24 hours ending 03/07/24 1335      Physical Exam  Constitutional:       Comments: Disheveled    HENT:      Head: Normocephalic.   Eyes:      General: No scleral icterus.     Extraocular Movements: Extraocular movements intact.   Cardiovascular:      Rate and Rhythm: Normal rate and regular rhythm.   Pulmonary:      Effort: No respiratory distress.      Breath sounds: Normal breath sounds. No wheezing or rales.   Abdominal:      General: There is no distension.      Palpations: Abdomen is soft.   Musculoskeletal:      Cervical back: Normal range of motion.   Skin:     General: Skin is warm and dry.      Findings: No rash.   Neurological:      Mental Status: He is alert.   Psychiatric:         Mood and Affect: Affect is labile.         Speech:  Speech is delayed.         Behavior: Behavior is slowed.         Cognition and Memory: Cognition is impaired.      Comments: Oriented to person and place. Uncooperative with questioning and physical exam. Verbally abusive.             Significant Labs: All pertinent labs within the past 24 hours have been reviewed.    Significant Imaging: I have reviewed all pertinent imaging results/findings within the past 24 hours.    Assessment/Plan:      * Acute encephalopathy  Likely 2/2 to drug use, but underlying COVID+ infection and possible UTI may be contributing factors. Utox negative, but reported history of recent methamphetamine use.     -- Patient improving on mental status   -- ED PEC'ed patient prior to admission to .   -- Capacity evaluation QD  -- PRN Zyprexa for non-redirectable agitation / violence  -- Treat underlying infection  -- See Bipolar Disorder A/P for home medications    Constipation  CTAP wc s/o moderate stool burden, diverticula w/o infection.     -- Aggressive bowel regimen      COVID  Patient presenting with incidental COVID+ 03/04/2024. No symptoms of dyspnea, shortness of breath. Presenting for unspecified weakness. No hypoxemia.  COVID-19 testing:   Isolation: Airborne/Droplet. Surgical mask on patient. Notify Infection Control. Finished remdesivir management. No steroids given not hypoxemic.     -- No O2 requirement at this time  -- COVID risk score: 5; will initiate High-Risk Remdesivir 3 day course  -- No role for Dexamethasone d/t normal SpO2 on RA  -- No role for CAP coverage d/t lack of focal consolidation on CXR  -- No role for therapeutic AC d/t normal SpO2 on RA  -- Acetaminophen 650mg PO Q6hr PRN fever/headache  -- IVF if indicated, restrictive strategy preferred, no maintenance IV if able  -- antitussives: dextromethorphan/guaifenesin, tessalon PRN  -- albuterol: 2-4 puff q6h if patient wheezing        Acute cystitis without hematuria  UA with Nitrite+. Unable to discern symptoms  d/t poor cooperation during ROS. Febrile to 101.5, although this may be related to COVID+. Previous UCX with largely pan-sensitive Proteus mirabilis.    -- No WBC or bacteria in UA makes acute UTI less certain; however, given Nitrite+ and new fever, will treat as UTI.  - switched to PO cefpodoxime     Bipolar disorder  -- Continue home medication regimen: carbamazepime, venlafaxine, risperidone        Intermittent explosive disorder  Psychiatry following, patient with intermittent explosive disorder, and recommend maintaining current regimen, as well as PEC as he is confused. Current presentation consistent with encephalopathy rather than psychosis or vero. Psych not recommending Psych placement. Once he is back to baseline, they will reassess need for psych placement.       Type 1 diabetes mellitus with hyperglycemia  Patient's FSGs are uncontrolled due to hyperglycemia on current medication regimen.  Last A1c reviewed-   Lab Results   Component Value Date    HGBA1C 11.3 (H) 12/10/2023     Most recent fingerstick glucose reviewed-   Recent Labs   Lab 03/06/24  1610 03/06/24 2003 03/07/24  0751 03/07/24  1109   POCTGLUCOSE 320* 261* 148* 272*       Current correctional scale  Low    Antihyperglycemics (From admission, onward)      Start     Stop Route Frequency Ordered    03/07/24 2100  insulin detemir U-100 (Levemir) pen 20 Units         -- SubQ Nightly 03/07/24 1316    03/05/24 1645  insulin aspart U-100 pen 6 Units         -- SubQ 3 times daily with meals 03/05/24 1247            -- Hold oral hyperglycemics  -- SSI, POCT BG qACHS; titrate basal/bolus regimen PRN to 140-180 goal  -- Diabetic diet  - Detemir 20U / Aspart 6U  - Reviewed previous A1C on 11.3  - patient non-compliant with insulin regimen at home     CAD (coronary artery disease)  Hyperlipidemia    Known CAD. Patient not exhibiting ACS symptoms at this time.     Last known LHC done 09/2023:  1.  60% in stent restenosis of previously placed right  coronary artery stent.  2.  Successful balloon angioplasty with a 3 0 x 20 fall by 3.5 x 20 noncompliant balloon.  60% stenosis reduced to 0%.  YENI 3 flow.  No dissection.  3.  50% stenosis noted in the mid and distal left anterior descending artery.  There is a 70 % stenosis at the apex, small caliber.    -- Continue HI Statin  -- Continue ASA 81  -- Hold Plavix as patient's > 1 year from last PCI    COPD (chronic obstructive pulmonary disease)  Patient's COPD is controlled currently.  Patient is currently off COPD Pathway.     -PRN inhalers    Sepsis  Currently not having sepsis criteria. Positive nitrates on admission. On 5 day course antibiotic.     - switched to PO cefpodoxime (end date: 3/8/24)     Polysubstance use disorder  History of recent methamphetamine, tobacco, alcohol use. Likely contributor to psychiatric disorder and agitation      Hemiparesis affecting right side as late effect of stroke  Noted per history    Anemia of chronic disease  Patient's anemia is currently controlled. Has not received any PRBCs to date. Etiology likely d/t chronic disease due to recurrent infections, inflammation  Current CBC reviewed-   Lab Results   Component Value Date    HGB 12.2 (L) 03/07/2024    HCT 38.7 (L) 03/07/2024     Monitor serial CBC and transfuse if patient becomes hemodynamically unstable, symptomatic or H/H drops below 7/21.    Primary hypertension  Chronic, controlled. Latest blood pressure and vitals reviewed-     Temp:  [97.6 °F (36.4 °C)-98 °F (36.7 °C)]   Pulse:  [71-95]   Resp:  [16-18]   BP: (137-176)/(74-88)   SpO2:  [95 %-97 %] .   Home meds for hypertension were reviewed and noted below.   Hypertension Medications               losartan (COZAAR) 50 MG tablet Take 1 tablet (50 mg total) by mouth once daily.    metoprolol succinate (TOPROL-XL) 25 MG 24 hr tablet Take 1 tablet (25 mg total) by mouth once daily.          - on losartan 50 as IP       VTE Risk Mitigation (From admission, onward)            Ordered     enoxaparin injection 40 mg  Daily         03/04/24 1904     IP VTE HIGH RISK PATIENT  Once         03/04/24 1904     Place sequential compression device  Until discontinued         03/04/24 1904                    Discharge Planning   ROLA: 3/8/2024     Code Status: Full Code   Is the patient medically ready for discharge?: No    Reason for patient still in hospital (select all that apply): Patient trending condition  Discharge Plan A: [facility]            Kaleb Monson MD  Department of Hospital Medicine   [facility]

## 2024-03-07 NOTE — ASSESSMENT & PLAN NOTE
Chronic, controlled. Latest blood pressure and vitals reviewed-     Temp:  [97.6 °F (36.4 °C)-98 °F (36.7 °C)]   Pulse:  [71-95]   Resp:  [16-18]   BP: (137-176)/(74-88)   SpO2:  [95 %-97 %] .   Home meds for hypertension were reviewed and noted below.   Hypertension Medications               losartan (COZAAR) 50 MG tablet Take 1 tablet (50 mg total) by mouth once daily.    metoprolol succinate (TOPROL-XL) 25 MG 24 hr tablet Take 1 tablet (25 mg total) by mouth once daily.          - on losartan 50 as IP

## 2024-03-08 PROBLEM — A41.9 SEPSIS: Chronic | Status: ACTIVE | Noted: 2020-03-01

## 2024-03-08 LAB
POCT GLUCOSE: 179 MG/DL (ref 70–110)
POCT GLUCOSE: 190 MG/DL (ref 70–110)
POCT GLUCOSE: 282 MG/DL (ref 70–110)
POCT GLUCOSE: 342 MG/DL (ref 70–110)

## 2024-03-08 PROCEDURE — 25000003 PHARM REV CODE 250: Performed by: HOSPITALIST

## 2024-03-08 PROCEDURE — 25000003 PHARM REV CODE 250

## 2024-03-08 PROCEDURE — 63600175 PHARM REV CODE 636 W HCPCS

## 2024-03-08 PROCEDURE — 21400001 HC TELEMETRY ROOM

## 2024-03-08 PROCEDURE — 27000207 HC ISOLATION

## 2024-03-08 RX ORDER — RISPERIDONE 1 MG/1
1 TABLET, ORALLY DISINTEGRATING ORAL DAILY
Status: DISCONTINUED | OUTPATIENT
Start: 2024-03-09 | End: 2024-03-09

## 2024-03-08 RX ORDER — RISPERIDONE 1 MG/1
2 TABLET, ORALLY DISINTEGRATING ORAL NIGHTLY
Status: DISCONTINUED | OUTPATIENT
Start: 2024-03-08 | End: 2024-03-09

## 2024-03-08 RX ADMIN — ATORVASTATIN CALCIUM 20 MG: 20 TABLET, FILM COATED ORAL at 09:03

## 2024-03-08 RX ADMIN — INSULIN ASPART 6 UNITS: 100 INJECTION, SOLUTION INTRAVENOUS; SUBCUTANEOUS at 09:03

## 2024-03-08 RX ADMIN — RISPERIDONE 2 MG: 1 TABLET, ORALLY DISINTEGRATING ORAL at 09:03

## 2024-03-08 RX ADMIN — CARBAMAZEPINE 400 MG: 200 TABLET, EXTENDED RELEASE ORAL at 09:03

## 2024-03-08 RX ADMIN — LOSARTAN POTASSIUM 50 MG: 50 TABLET, FILM COATED ORAL at 09:03

## 2024-03-08 RX ADMIN — RISPERIDONE 1 MG: 1 TABLET, ORALLY DISINTEGRATING ORAL at 09:03

## 2024-03-08 RX ADMIN — Medication 100 MG: at 09:03

## 2024-03-08 RX ADMIN — CEFPODOXIME PROXETIL 200 MG: 200 TABLET, FILM COATED ORAL at 09:03

## 2024-03-08 RX ADMIN — INSULIN ASPART 3 UNITS: 100 INJECTION, SOLUTION INTRAVENOUS; SUBCUTANEOUS at 12:03

## 2024-03-08 RX ADMIN — ENOXAPARIN SODIUM 40 MG: 40 INJECTION SUBCUTANEOUS at 06:03

## 2024-03-08 RX ADMIN — VENLAFAXINE HYDROCHLORIDE 75 MG: 75 CAPSULE, EXTENDED RELEASE ORAL at 09:03

## 2024-03-08 RX ADMIN — THERA TABS 1 TABLET: TAB at 09:03

## 2024-03-08 RX ADMIN — FOLIC ACID 1 MG: 1 TABLET ORAL at 09:03

## 2024-03-08 RX ADMIN — INSULIN ASPART 4 UNITS: 100 INJECTION, SOLUTION INTRAVENOUS; SUBCUTANEOUS at 09:03

## 2024-03-08 RX ADMIN — SENNOSIDES AND DOCUSATE SODIUM 1 TABLET: 8.6; 5 TABLET ORAL at 09:03

## 2024-03-08 RX ADMIN — POLYETHYLENE GLYCOL 3350 17 G: 17 POWDER, FOR SOLUTION ORAL at 09:03

## 2024-03-08 RX ADMIN — INSULIN ASPART 6 UNITS: 100 INJECTION, SOLUTION INTRAVENOUS; SUBCUTANEOUS at 05:03

## 2024-03-08 RX ADMIN — ASPIRIN 81 MG CHEWABLE TABLET 81 MG: 81 TABLET CHEWABLE at 09:03

## 2024-03-08 RX ADMIN — Medication 6 MG: at 09:03

## 2024-03-08 RX ADMIN — INSULIN ASPART 6 UNITS: 100 INJECTION, SOLUTION INTRAVENOUS; SUBCUTANEOUS at 12:03

## 2024-03-08 NOTE — PLAN OF CARE
Inpatient consult to Neurology  Consult performed by: Kanu Pichardo MD  Consult ordered by: Kaleb Monson MD         Neurology is consulted due to concern for NPH in Cali Mabry.     CTH reviewed. Ventriculomegaly has been present since at least 4/13/2019.    NPH diagnosis is made in the outpatient setting with a multidisciplinary evaluation by neurosurgery and PT. Recommend referring patient to neurosurgery clinic on discharge.    Please contact general neurology with any questions.

## 2024-03-08 NOTE — PROGRESS NOTES
"CONSULTATION LIAISON PSYCHIATRY PROGRESS NOTE    Patient Name: Cali Mabry  MRN: [id number]  Patient Class: IP- Inpatient  Admission Date: 3/4/2024  Attending Physician: Siddhartha Zavala MD      SUBJECTIVE:   Cali Mabry is a 58 y.o. 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 UDS negative, Carbamazepine level undetectable. PEC placed in ED due to combative behavior and confusion. Patient was found to be COVID+ and have UTI. He was admitted to the hospital for Acute encephalopathy. Patient received Zyprexa 5 mg IM on 3/5 @ 1539.    Psychiatry consulted for "patient with history of Bipolar and substance use disorder, currently PEC'd. Consult for psych evaluation and need for placement in psych facility?"     Patient received PRN Zyprexa 5 mg IM @ 1512 yesterday. There are no notes documenting behavior at this time. Compliant with PO medications.    Overnight, CEC placed by .    Today, patient seen at bedside. He is somnolent and difficult to engage in interview today. Speech is slurred, garbled, and difficult to understand. The patient does not open eyes on command but does follow command to squeeze the interviewer's fingers. He reports he is not feeling well and reports "upset stomach" and nausea on further questioning. Interview terminated due to sensorium.    Collateral:  Spoke with Tamia with [facility] [phone]. Patient is followed in the outpatient setting by [facility]3 with most recent encounter last week. The ACT Team visits the patient in his home and the patient has home health services. Hx of difficulties with compliance of both psychotropic and diabetic medications. Current regimen: Risperdal 1 mg BID, " "Effexor XR 75 mg daily. Reports Tegretol is managed prescribed by PCP and not for purposes of mood stability. Reports the patient is fully oriented at baseline and wheelchair bound. Reports the patient is "argumentative, threatening, and degrading," specifically to his mother, at baseline. Denies any known violence, however. She denies any witnessed psychotic features in the past. Denies any recent substance use to her knowledge and cites that the patient does not leave the home and the family is "cautious" about who they let in the home to be around the patient. Reports the patient is unable to care for himself at baseline and has previously been considered for Nursing Home placement but has been declined in the past.     OBJECTIVE:    Mental Status Exam:  General Appearance: dressed in hospital garb, in no acute distress, thin and gaunt, disheveled, malodorous  Behavior:  unable to assess  Involuntary Movements and Motor Activity: no abnormal involuntary movements noted; no tics, no tremors, no akathisia, no dystonia, no evidence of tardive dyskinesia; no psychomotor agitation or retardation  Gait and Station: unable to assess - patient lying down or seated  Speech and Language: slurred, unintelligible  Mood: unable to assess  Affect: dysphoric  Thought Process and Associations: Unable to Assess  Thought Content and Perceptions:: Unable to Assess  Sensorium and Orientation:  somnolent, oriented to person only  Recent and Remote Memory: Unable to  Formally Assess  Attention and Concentration: easily distractible, Unable to Formally Assess  Fund of Knowledge: Unable to Formally Assess  Insight: limited, limited/partial awareness of illness  Judgment: behavior is adequate/appropriate to circumstances, improving    CAM ICU positive? Unable to formally assess      ASSESSMENT & RECOMMENDATIONS   Acute Encephalopathy   R/O NPH  Intermittent Explosive Disorder  Hx TBI  Hx CVA  Hx Unspecified Neurocognitive Disorder  Hx " Bipolar Disorder  Hx MDD        Intermittent Explosive Disorder  Hx Bipolar Disorder  Hx MDD  PSYCH MEDICATIONS  Scheduled -  Change Risperdal to 1 mg am and 2 mg pm PO  Continue Effexor XR 75 mg PO daily  PRN  Continue Zyprexa 5 mg PO or IM for non-redirectable agitation associated with breakthrough psychosis or vero     DELIRIUM  Recommended Neurology Consult for NPH R/O   Given acute mental status change, gait instability per collateral ~2-3 months, possible urinary incontinence and CT Head results from 3/4 with ventriculomegaly out of proportion to the degree of sulcal enlargement  Per Neuro, no interventions or investigations to be initiated in the inpatient setting.  DELIRIUM BEHAVIOR MANAGEMENT  PLEASE utilize CHEMICAL restraints with PRN meds first for agitation. Minimize use of PHYSICAL restraints OR have periods of being out of physical restraints if possible.  Keep window shades open and room lit during day and room dim at night in order to promote normal sleep-wake cycles  Encourage family at bedside. [place] patient often to situation, location, date.  Continue to Limit or Discontinue use of Narcotics, Benzos and Anti-cholinergic medications as they may worsen delirium.  Continue medical workup for causative etiology of Delirium.      RISK ASSESSMENT  NEEDS CEC because patient is in imminent danger of hurting self or others and is gravely disabled. & NEEDS 1:1 sitter     FOLLOW UP  Will follow up while in house     DISPOSITION - once medically cleared:   Will reassess daily    Please contact ON CALL psychiatry service (24/7) for any acute issues that may arise.    Dr. Martha Storey   Psychiatry  [facility]  3/8/2024 8:27 AM        --------------------------------------------------------------------------------------------------------------------------------------------------------------------------------------------------------------------------------------    CONTINUED OBJECTIVE  clinical data & findings reviewed and noted for above decision making    Current Medications:   Scheduled Meds:    aspirin  81 mg Oral Daily    atorvastatin  20 mg Oral Daily    carBAMazepine  400 mg Oral Daily    cefpodoxime  200 mg Oral Q12H    enoxparin  40 mg Subcutaneous Daily    folic acid  1 mg Oral Daily    insulin aspart U-100  6 Units Subcutaneous TIDWM    insulin detemir U-100 (Levemir)  20 Units Subcutaneous QHS    losartan  50 mg Oral Daily    melatonin  6 mg Oral Nightly    multivitamin  1 tablet Oral Daily    polyethylene glycol  17 g Oral Q6H WAKE    risperiDONE  1 mg Oral BID    senna-docusate 8.6-50 mg  1 tablet Oral BID    thiamine  100 mg Oral Daily    venlafaxine  75 mg Oral Daily     PRN Meds: albuterol, benzonatate, dextromethorphan-guaiFENesin  mg/5 ml, dextrose 10%, dextrose 10%, glucagon (human recombinant), glucose, glucose, insulin aspart U-100, labetalol, naloxone, ondansetron, sodium chloride 0.9%    Allergies:   Review of patient's allergies indicates:  No Known Allergies    Vitals  Vitals:    03/08/24 0453   BP:    Pulse: 64   Resp: 18   Temp: 97.4 °F (36.3 °C)       Labs/Imaging/Studies:  Recent Results (from the past 24 hour(s))   POCT glucose    Collection Time: 03/07/24 11:09 AM   Result Value Ref Range    POCT Glucose 272 (H) 70 - 110 mg/dL   Hemoglobin A1c    Collection Time: 03/07/24  2:31 PM   Result Value Ref Range    Hemoglobin A1C 10.1 (H) 4.0 - 5.6 %    Estimated Avg Glucose 243 (H) 68 - 131 mg/dL   POCT glucose    Collection Time: 03/07/24  3:58 PM   Result Value Ref Range    POCT Glucose 114 (H) 70 - 110 mg/dL   POCT glucose    Collection Time: 03/07/24  7:10 PM   Result Value Ref Range    POCT Glucose 171 (H) 70 - 110 mg/dL     Imaging Results              CT Head Without Contrast (Final result)  Result time 03/04/24 16:55:45      Final result by Jered Cheatham MD (03/04/24 16:55:45)                   Impression:      No evidence of acute hemorrhage or major  vascular distribution infarct.    Chronic small vessel ischemic change with remote right occipital and left cerebellar infarcts.    Ventriculomegaly out of proportion to the degree of sulcal enlargement, similar to prior exams.  While this could relate to cerebral volume loss, the configuration does raise the possibility of normal pressure hydrocephalus. Clinical correlation required.      Electronically signed by: Jered Cheatham MD  Date:    03/04/2024  Time:    16:55               Narrative:    EXAMINATION:  CT HEAD WITHOUT CONTRAST    CLINICAL HISTORY:  Mental status change, unknown cause;    TECHNIQUE:  Low dose axial CT images obtained throughout the head without the use of intravenous contrast.  Axial, sagittal and coronal reconstructions were performed.    COMPARISON:  12/10/2023.    FINDINGS:  Intracranial compartment:    Ventricles are enlarged, similar to prior.  Third ventricle diameter again measuring up to 1.4 cm.  Ventricular enlargement out of proportion of degree of sulcal enlargement.    Chronic small vessel ischemic change in the supratentorial white matter.  Remote right occipital infarct.  Remote left cerebellar infarct.  No acute major vascular distribution infarct.  No acute parenchymal hemorrhage.  No new intracranial mass effect or midline shift.    No extra-axial blood or fluid collections.    Skull/extracranial contents (limited evaluation):    No displaced calvarial fracture.    Mastoid air cells are clear. Mild patchy mucosal thickening in the visualized paranasal sinuses.                                       CT Abdomen Pelvis With IV Contrast NO Oral Contrast (Final result)  Result time 03/04/24 18:05:52      Final result by New Falcon MD (03/04/24 18:05:52)                   Impression:      Moderate colonic stool burden with mild wall thickening of the rectum, correlate with signs of constipation or proctitis.    Diffuse bladder wall thickening, correlate with urinalysis and  symptoms of cystitis.    Diverticulosis coli without evidence of acute diverticulitis    Stable 5 mm right middle lobe solid pulmonary micronodule.    Additional findings as above.    Electronically signed by resident: Marsha Cardona  Date:    03/04/2024  Time:    17:27    Electronically signed by: New Falcon MD  Date:    03/04/2024  Time:    18:05               Narrative:    EXAMINATION:  CT ABDOMEN PELVIS WITH IV CONTRAST    CLINICAL HISTORY:  Abdominal abscess/infection suspected;    TECHNIQUE:  Axial images of the abdomen and pelvis were acquired after administration of 75 cc Omnipaque 350 IV contrast. No oral contrast was administered.  Coronal and sagittal reconstructions were also obtained.    COMPARISON:  CT 12/12/2023 and 07/26/2023.    FINDINGS:  Examination degraded by patient motion artifact.    Heart: Normal heart size.  No pericardial fluid.  No significant calcific coronary atherosclerosis.    Lungs: Evaluation of the lung bases limited due to respiratory motion.  Allowing for this, stable linear bandlike opacities of the right lung base suggestive of atelectasis/scarring.  Stable 5 mm right middle lobe solid pulmonary micronodule (2-7).  No new focal consolidation.  No pleural effusion or pneumothorax.    Liver: Normal in size and contour.  No focal hepatic lesion.    Gallbladder: No calcified gallstones.    Bile Ducts: No biliary ductal dilatation.    Pancreas: No mass or peripancreatic fat stranding.    Spleen: Spleen is upper limits of normal in size measuring 12 cm.    Adrenals: No significant abnormalities.    Kidneys/Ureters: Normal in size and location. Normal enhancement. No hydronephrosis or nephrolithiasis. No ureteral dilatation.    Bladder: Diffuse bladder wall thickening.    Reproductive organs: Prostate is unremarkable.    Peritoneum: No free air or free fluid.    Retroperitoneum: No pathologically enlarged retroperitoneal lymph nodes.    Bowel/Mesentery: Small hiatal hernia.  Stomach  is otherwise unremarkable.  Small bowel is normal in caliber without evidence of inflammation or obstruction.  Moderate colonic stool burden.  Mild wall thickening of the rectum with large fecal burden, component of proctitis to be considered.  Scattered colonic diverticuli.  Appendix not definitively visualized.  No steve-cecal soft tissue stranding.    Abdominal wall:  Small fat containing umbilical hernia. 4.7 x 1.8 cm fat containing lesion along the left anterolateral chest wall, favored to represent a lipoma.    Vasculature: Mild calcific atherosclerosis of the abdominal aorta and its branches.  No aneurysm.    Bones: Mild degenerative changes spine.  Prominent Schmorl's node at L1, unchanged.  Healing left anterolateral rib fractures.  No acute fractures.  Lucent lesion within the right femoral neck, stable dating back to multiple priors.                                       X-Ray Chest AP Portable (Final result)  Result time 03/04/24 13:37:35      Final result by Cullen Bush MD (03/04/24 13:37:35)                   Impression:      See above      Electronically signed by: Cullen Bush MD  Date:    03/04/2024  Time:    13:37               Narrative:    EXAMINATION:  XR CHEST AP PORTABLE    CLINICAL HISTORY:  Sepsis;    TECHNIQUE:  Single frontal view of the chest was performed.    COMPARISON:  None 12/22/2023    FINDINGS:  Heart size normal.  The lungs are clear.  No pleural effusion

## 2024-03-08 NOTE — PROGRESS NOTES
Food & Nutrition  Education    Diet Education: A1C  Time Spent: 1 minute  Learners: Patient      Nutrition Education provided with handouts: Meal Planning Using the Plate Method, CHO Counting for People with Diabetes      Comments: RD attempted to speak with patient. Patient is not appropriate for diet education at this time. RD to left educational handout at bedside and will f/u with pt on Thursday (3/14) to provide education, if pt remains admitted.       All questions and concerns answered. Dietitian's contact information provided.       Follow-Up: Yes    Please Re-consult as needed        Thanks!    Luis Easley Registration Eligible, Provisional LDN

## 2024-03-08 NOTE — PROGRESS NOTES
[facility]  Progress Note    Patient Name: Cali Mabry  MRN: [id number]  Patient Class: IP- Inpatient   Admission Date: 3/4/2024  Length of Stay: 4 days  Attending Physician: Siddhartha Zavala MD  Primary Care Provider: Lawanda Flores NP        Subjective:     Principal Problem:Acute encephalopathy        HPI:  58 M with DM1, extensive IVDU, HCV Ab+, HTN, previous CVA with R sided deficits, HFpEF, CAD, chronic pancreatitis, mixed mood / psychiatric disorder presenting [place] [facility] [place] with chief complaint of weakness of unspecified duration and onset. Admits to using shooting methamphetamines. Rest of history from patient is non-contributory due to uncooperation and alternating agitation and somnolence. HPI is written with review of previous medical records, ED records, discussed with patient's mother via telephone, who was able to provide limited insight into the patient's acute medical condition. Patient's mother states that patient's PO intake has decreased but he would not cooperate with her nor would he tell her what was bothering him. Patient reported to want to stay in bed for the last 2 days.     Of note, patient was recently discharged from [facility] following treatment of DKA and UTI (largely pan-sensitive Proteus mirabilis). In ED, patient was extremely agitated, given multiple doses of Ativan due to agitation, aggressive/abusive statements towards nursing and medical staff. Tachycardic to 110s, hypertensive, febrile to 101.5. Given broad spectrum Abx (Vanc/Zosyn). PEC'ed by ED due to extreme agitation.    Overview/Hospital Course:  Patient admitted for altered mental status. Suspect it was secondary to UTI and COVID infection. UDS negative. Patient was PEC'd while in the ED for agitation. Patients mental status improving. Will consult psychiatry if patient needs psych placement. Finished remdesivir course, not hypoxic. Improving respiratory status. Minor episodes of cough.  Reviewed previous A1C from 2 months on 11.3. uptitrated insulin regimen. Psychiatry following, patient with intermittent explosive disorder, and recommend maintaining current regimen, as well as PEC as he is confused. Current presentation consistent with encephalopathy rather than psychosis or vero. Psych not recommending Psych placement. Once he is back to baseline, they will reassess need for psych placement. Titrating blood glucose. Continues on PO cefpodoxime. Neurology consulted for NPH, recommending outpatient neurosurgery evaluation.    Interval History: no agitation overnight. Patient needs to be PEC'd continuously as per psych recs. Reminded sitter at bedside to keep room door since patient is COVID positive and should follow protocol. Psych concerned of NPH. Consulted Neurology and recommending following up as outpatient with neurosurgery for workup.     Review of Systems   Unable to perform ROS: Psychiatric disorder     Objective:     Vital Signs (Most Recent):  Temp: 97.4 °F (36.3 °C) (03/08/24 0453)  Pulse: 64 (03/08/24 0453)  Resp: 18 (03/08/24 0453)  BP: (!) 159/88 (03/08/24 0006)  SpO2: (!) 93 % (03/08/24 0453) Vital Signs (24h Range):  Temp:  [97.4 °F (36.3 °C)-98 °F (36.7 °C)] 97.4 °F (36.3 °C)  Pulse:  [] 64  Resp:  [16-18] 18  SpO2:  [93 %-98 %] 93 %  BP: (140-176)/(71-90) 159/88     Weight: 63.5 kg (140 lb)  Body mass index is 18.99 kg/m².    Intake/Output Summary (Last 24 hours) at 3/8/2024 0838  Last data filed at 3/7/2024 2354  Gross per 24 hour   Intake 480 ml   Output 2100 ml   Net -1620 ml         Physical Exam  Constitutional:       Comments: Disheveled    HENT:      Head: Normocephalic.   Eyes:      General: No scleral icterus.     Extraocular Movements: Extraocular movements intact.   Cardiovascular:      Rate and Rhythm: Normal rate and regular rhythm.   Pulmonary:      Effort: No respiratory distress.      Breath sounds: Normal breath sounds. No wheezing or rales.   Abdominal:       General: There is no distension.      Palpations: Abdomen is soft.   Musculoskeletal:      Cervical back: Normal range of motion.   Skin:     General: Skin is warm and dry.      Findings: No rash.   Neurological:      Mental Status: He is alert.   Psychiatric:         Mood and Affect: Affect is labile.         Speech: Speech is delayed.         Behavior: Behavior is slowed.         Cognition and Memory: Cognition is impaired.      Comments: Oriented to person and place. Uncooperative with questioning and physical exam.             Significant Labs: All pertinent labs within the past 24 hours have been reviewed.    Significant Imaging: I have reviewed all pertinent imaging results/findings within the past 24 hours.    Assessment/Plan:      * Acute encephalopathy  Likely 2/2 to drug use, but underlying COVID+ infection and possible UTI may be contributing factors. Utox negative, but reported history of recent methamphetamine use.     -- Patient improving on mental status   -- ED PEC'ed patient prior to admission to .   -- Capacity evaluation QD  -- PRN Zyprexa for non-redirectable agitation / violence  -- Treat underlying infection  -- See Bipolar Disorder A/P for home medications    Constipation  CTAP wc s/o moderate stool burden, diverticula w/o infection.     -- Aggressive bowel regimen  - pending KUB      COVID  Patient presenting with incidental COVID+ 03/04/2024. No symptoms of dyspnea, shortness of breath. Presenting for unspecified weakness. No hypoxemia.  COVID-19 testing:   Isolation: Airborne/Droplet. Surgical mask on patient. Notify Infection Control. Finished remdesivir management. No steroids given not hypoxemic.     -- No O2 requirement at this time  -- COVID risk score: 5; will initiate High-Risk Remdesivir 3 day course  -- No role for Dexamethasone d/t normal SpO2 on RA  -- No role for CAP coverage d/t lack of focal consolidation on CXR  -- No role for therapeutic AC d/t normal SpO2 on RA  --  Acetaminophen 650mg PO Q6hr PRN fever/headache  -- IVF if indicated, restrictive strategy preferred, no maintenance IV if able  -- antitussives: dextromethorphan/guaifenesin, tessalon PRN  -- albuterol: 2-4 puff q6h if patient wheezing        Acute cystitis without hematuria  UA with Nitrite+. Unable to discern symptoms d/t poor cooperation during ROS. Febrile to 101.5, although this may be related to COVID+. Previous UCX with largely pan-sensitive Proteus mirabilis.    -- No WBC or bacteria in UA makes acute UTI less certain; however, given Nitrite+ and new fever, will treat as UTI.  - continue on PO cefpodoxime     Bipolar disorder  -- Continue home medication regimen: carbamazepime, venlafaxine, risperidone        Intermittent explosive disorder  Psychiatry following, patient with intermittent explosive disorder, and recommend maintaining current regimen, as well as PEC as he is confused. Current presentation consistent with encephalopathy rather than psychosis or vero. Psych not recommending Psych placement. Once he is back to baseline, they will reassess need for psych placement.       Type 1 diabetes mellitus with hyperglycemia  Patient's FSGs are uncontrolled due to hyperglycemia on current medication regimen.  Last A1c reviewed-   Lab Results   Component Value Date    HGBA1C 10.1 (H) 03/07/2024     Most recent fingerstick glucose reviewed-   Recent Labs   Lab 03/07/24  1109 03/07/24  1558 03/07/24  1910   POCTGLUCOSE 272* 114* 171*       Current correctional scale  Low    Antihyperglycemics (From admission, onward)      Start     Stop Route Frequency Ordered    03/07/24 2100  insulin detemir U-100 (Levemir) pen 20 Units         -- SubQ Nightly 03/07/24 1316    03/07/24 1649  insulin aspart U-100 pen 0-5 Units         -- SubQ Before meals & nightly PRN 03/07/24 1550    03/05/24 1645  insulin aspart U-100 pen 6 Units         -- SubQ 3 times daily with meals 03/05/24 1247            -- Hold oral  hyperglycemics  -- SSI, POCT BG qACHS; titrate basal/bolus regimen PRN to 140-180 goal  -- Diabetic diet  - Detemir 20U / Aspart 6U + LDSSI   - Current A1C 10.1. Reviewed previous A1C on 11.3  - patient non-compliant with insulin regimen at home     CAD (coronary artery disease)  Hyperlipidemia    Known CAD. Patient not exhibiting ACS symptoms at this time.     Last known LHC done 09/2023:  1.  60% in stent restenosis of previously placed right coronary artery stent.  2.  Successful balloon angioplasty with a 3 0 x 20 fall by 3.5 x 20 noncompliant balloon.  60% stenosis reduced to 0%.  YENI 3 flow.  No dissection.  3.  50% stenosis noted in the mid and distal left anterior descending artery.  There is a 70 % stenosis at the apex, small caliber.    -- Continue HI Statin  -- Continue ASA 81  -- Hold Plavix as patient's > 1 year from last PCI    COPD (chronic obstructive pulmonary disease)  Patient's COPD is controlled currently.  Patient is currently off COPD Pathway.     -PRN inhalers    Sepsis  Currently not having sepsis criteria. Positive nitrates on admission. On 5 day course antibiotic.     - Continue on PO cefpodoxime     Polysubstance use disorder  History of recent methamphetamine, tobacco, alcohol use. Likely contributor to psychiatric disorder and agitation      Hemiparesis affecting right side as late effect of stroke  Noted per history    Anemia of chronic disease  Patient's anemia is currently controlled. Has not received any PRBCs to date. Etiology likely d/t chronic disease due to recurrent infections, inflammation  Current CBC reviewed-   Lab Results   Component Value Date    HGB 12.2 (L) 03/07/2024    HCT 38.7 (L) 03/07/2024     Monitor serial CBC and transfuse if patient becomes hemodynamically unstable, symptomatic or H/H drops below 7/21.    Primary hypertension  Chronic, controlled. Latest blood pressure and vitals reviewed-     Temp:  [97.4 °F (36.3 °C)-98 °F (36.7 °C)]   Pulse:  []    Resp:  [16-18]   BP: (140-176)/(71-90)   SpO2:  [93 %-98 %] .   Home meds for hypertension were reviewed and noted below.   Hypertension Medications               losartan (COZAAR) 50 MG tablet Take 1 tablet (50 mg total) by mouth once daily.    metoprolol succinate (TOPROL-XL) 25 MG 24 hr tablet Take 1 tablet (25 mg total) by mouth once daily.          - on losartan 50 as IP       VTE Risk Mitigation (From admission, onward)           Ordered     enoxaparin injection 40 mg  Daily         03/04/24 1904     IP VTE HIGH RISK PATIENT  Once         03/04/24 1904     Place sequential compression device  Until discontinued         03/04/24 1904                    Discharge Planning   ROLA: 3/8/2024     Code Status: Full Code   Is the patient medically ready for discharge?: No    Reason for patient still in hospital (select all that apply): Patient trending condition  Discharge Plan A: [facility]          Kaleb Monson MD  Department of Hospital Medicine   [facility]

## 2024-03-08 NOTE — ASSESSMENT & PLAN NOTE
Currently not having sepsis criteria. Positive nitrates on admission. On 5 day course antibiotic.     - Continue on PO cefpodoxime

## 2024-03-08 NOTE — ASSESSMENT & PLAN NOTE
Patient's FSGs are uncontrolled due to hyperglycemia on current medication regimen.  Last A1c reviewed-   Lab Results   Component Value Date    HGBA1C 10.1 (H) 03/07/2024     Most recent fingerstick glucose reviewed-   Recent Labs   Lab 03/07/24  1109 03/07/24  1558 03/07/24  1910   POCTGLUCOSE 272* 114* 171*       Current correctional scale  Low    Antihyperglycemics (From admission, onward)      Start     Stop Route Frequency Ordered    03/07/24 2100  insulin detemir U-100 (Levemir) pen 20 Units         -- SubQ Nightly 03/07/24 1316    03/07/24 1649  insulin aspart U-100 pen 0-5 Units         -- SubQ Before meals & nightly PRN 03/07/24 1550    03/05/24 1645  insulin aspart U-100 pen 6 Units         -- SubQ 3 times daily with meals 03/05/24 1247            -- Hold oral hyperglycemics  -- SSI, POCT BG qACHS; titrate basal/bolus regimen PRN to 140-180 goal  -- Diabetic diet  - Detemir 20U / Aspart 6U + LDSSI   - Current A1C 10.1. Reviewed previous A1C on 11.3  - patient non-compliant with insulin regimen at home

## 2024-03-08 NOTE — SUBJECTIVE & OBJECTIVE
Interval History: no agitation overnight. Patient needs to be PEC'd continuously as per psych recs. Reminded sitter at bedside to keep room door since patient is COVID positive and should follow protocol. Psych concerned of NPH. Consulted Neurology and recommending following up as outpatient with neurosurgery for workup.     Review of Systems   Unable to perform ROS: Psychiatric disorder     Objective:     Vital Signs (Most Recent):  Temp: 97.4 °F (36.3 °C) (03/08/24 0453)  Pulse: 64 (03/08/24 0453)  Resp: 18 (03/08/24 0453)  BP: (!) 159/88 (03/08/24 0006)  SpO2: (!) 93 % (03/08/24 0453) Vital Signs (24h Range):  Temp:  [97.4 °F (36.3 °C)-98 °F (36.7 °C)] 97.4 °F (36.3 °C)  Pulse:  [] 64  Resp:  [16-18] 18  SpO2:  [93 %-98 %] 93 %  BP: (140-176)/(71-90) 159/88     Weight: 63.5 kg (140 lb)  Body mass index is 18.99 kg/m².    Intake/Output Summary (Last 24 hours) at 3/8/2024 0838  Last data filed at 3/7/2024 2354  Gross per 24 hour   Intake 480 ml   Output 2100 ml   Net -1620 ml         Physical Exam  Constitutional:       Comments: Disheveled    HENT:      Head: Normocephalic.   Eyes:      General: No scleral icterus.     Extraocular Movements: Extraocular movements intact.   Cardiovascular:      Rate and Rhythm: Normal rate and regular rhythm.   Pulmonary:      Effort: No respiratory distress.      Breath sounds: Normal breath sounds. No wheezing or rales.   Abdominal:      General: There is no distension.      Palpations: Abdomen is soft.   Musculoskeletal:      Cervical back: Normal range of motion.   Skin:     General: Skin is warm and dry.      Findings: No rash.   Neurological:      Mental Status: He is alert.   Psychiatric:         Mood and Affect: Affect is labile.         Speech: Speech is delayed.         Behavior: Behavior is slowed.         Cognition and Memory: Cognition is impaired.      Comments: Oriented to person and place. Uncooperative with questioning and physical exam.              Significant Labs: All pertinent labs within the past 24 hours have been reviewed.    Significant Imaging: I have reviewed all pertinent imaging results/findings within the past 24 hours.

## 2024-03-08 NOTE — MEDICAL/APP STUDENT
[facility]  Progress Note    Patient Name: Cali Mabry  MRN: [id number]  Patient Class: IP- Inpatient   Admission Date: 3/4/2024  Length of Stay: 4 days  Attending Physician: Siddhartha Zavala MD  Primary Care Provider: Lawanda Flores NP        Subjective:     Principal Problem:Acute encephalopathy        HPI:  58 M with DM1, extensive IVDU, HCV Ab+, HTN, previous CVA with R sided deficits, HFpEF, CAD, chronic pancreatitis, mixed mood / psychiatric disorder presenting to [facility] ED with chief complaint of weakness of unspecified duration and onset. Admits to using shooting methamphetamines. Rest of history from patient is non-contributory due to uncooperation and alternating agitation and somnolence. HPI is written with review of previous medical records, ED records, discussed with patient's mother via telephone, who was able to provide limited insight into the patient's acute medical condition. Patient's mother states that patient's PO intake has decreased but he would not cooperate with her nor would he tell her what was bothering him. Patient reported to want to stay in bed for the last 2 days.     Of note, patient was recently discharged from [facility] following treatment of DKA and UTI (largely pan-sensitive Proteus mirabilis). In ED, patient was extremely agitated, given multiple doses of Ativan due to agitation, aggressive/abusive statements towards nursing and medical staff. Tachycardic to 110s, hypertensive, febrile to 101.5. Given broad spectrum Abx (Vanc/Zosyn). PEC'ed by ED due to extreme agitation.      Overview/Hospital Course:  Patient admitted for altered mental status. Suspect it was secondary to UTI and COVID infection. UDS negative. Patient was PEC'd while in the ED for agitation. Patients mental status improving. Will consult psychiatry if patient needs psych placement. Finished remdesivir course, not hypoxic. Improving respiratory status. Minor episodes of cough.  Reviewed previous A1C from 2 months on 11.3. uptitrated insulin regimen. Psychiatry following, patient with intermittent explosive disorder, and recommend maintaining current regimen, as well as PEC as he is confused. Current presentation consistent with encephalopathy rather than psychosis or vero. Psych not recommending Psych placement. Once he is back to baseline, they will reassess need for psych placement. Titrating blood glucose. Continues on PO cefpodoxime. Neurology consulted for NPH, recommending outpatient neurosurgery evaluation.      Interval History: -1620 mL overnight. Last dose of Zyprexa 1512. Somnolent but no complaints on exam. Psych recommends continuing PEC and consulting neuro d/t possible normal pressure hydrocephalus on CT head. Neuro recommends referring to outpatient NSGY clinic for evaluation.    Review of Systems   Constitutional:  Negative for fever.   Eyes:  Negative for visual disturbance.   Respiratory:  Negative for cough, chest tightness and shortness of breath.    Cardiovascular:  Negative for chest pain.   Gastrointestinal:  Negative for abdominal pain, blood in stool, constipation and diarrhea.   Genitourinary:  Negative for dysuria and hematuria.   Neurological:  Negative for headaches.     Objective:   [facility]Weight: 63.5 kg (140 lb)  Body mass index is 18.99 kg/m².    Intake/Output Summary (Last 24 hours) at 3/8/2024 1303  Last data filed at 3/7/2024 2354  Gross per 24 hour   Intake 360 ml   Output 2100 ml   Net -1740 ml     Physical Exam  Constitutional:       General: He is not in acute distress.     Appearance: He is not ill-appearing, toxic-appearing or diaphoretic.   HENT:      Head: Normocephalic and atraumatic.   Eyes:      Comments: Eyes closed during exam   Cardiovascular:      Rate and Rhythm: Normal rate and regular rhythm.      Pulses: Normal pulses.      Heart sounds: Normal heart sounds.   Pulmonary:      Effort: Pulmonary effort is normal. No respiratory  distress.      Breath sounds: Rales present.   Abdominal:      General: Bowel sounds are normal. There is no distension.      Palpations: Abdomen is soft.      Tenderness: There is no abdominal tenderness. There is no guarding or rebound.   Musculoskeletal:      Right lower leg: No edema.      Left lower leg: No edema.   Skin:     General: Skin is warm and dry.   Neurological:      Mental Status: He is disoriented.      Motor: Weakness (weak  strength) present.   Psychiatric:         Attention and Perception: Attention normal.         Speech: Speech is slurred.         Behavior: Behavior is slowed.         Cognition and Memory: Cognition is impaired.         Significant Labs: All pertinent labs within the past 24 hours have been reviewed.  POCT Glucose:   Recent Labs   Lab 03/07/24  1910 03/08/24  0910 03/08/24  1148   POCTGLUCOSE 171* 342* 282*       Significant Imaging: I have reviewed all pertinent imaging results/findings within the past 24 hours.      Assessment/Plan:    * Acute encephalopathy  Likely 2/2 to drug use, but underlying COVID+ infection and possible UTI may be contributing factors. Utox negative, but reported history of recent methamphetamine use.     -- Patient's mental status improving  -- ED PEC'ed patient prior to admission to .  -- Capacity evaluation QD  -- PRN Zyprexa for non-redirectable agitation / violence  -- Treat underlying infection  -- See Bipolar Disorder A/P for home medications  -- Psychiatry following    Constipation  CTAP wc s/o moderate stool burden, diverticula w/o infection.     -- Miralax q6h when awake  -- senna-docusate BID      COVID  Patient presenting with incidental COVID+ 03/04/2024. No symptoms of dyspnea, shortness of breath. Presenting for unspecified weakness. No hypoxemia.  COVID-19 testing:   Isolation: Airborne/Droplet. Surgical mask on patient. Notify Infection Control    -- No O2 requirement at this time  -- COVID risk score: 5  -- Remdesivir 3 day  course completed  -- No role for Dexamethasone d/t normal SpO2 on RA  -- No role for CAP coverage d/t lack of focal consolidation on CXR  -- No role for therapeutic AC d/t normal SpO2 on RA  -- Acetaminophen 650mg PO Q6hr PRN fever/headache  -- IVF if indicated, restrictive strategy preferred, no maintenance IV if able  -- antitussives: dextromethorphan/guaifenesin, tessalon PRN  -- albuterol: 2-4 puff q6h if patient wheezing  -- afebrile with no respiratory complaints this AM      Acute cystitis without hematuria  UA with Nitrite+. Unable to discern symptoms d/t poor cooperation during ROS. Febrile to 101.5, although this may be related to COVID+. Previous UCX with largely pan-sensitive Proteus mirabilis.    -- No WBC or bacteria in UA makes acute UTI less certain; however, given nitrite+ and new fever, will treat as UTI.  -- continue PO cefpodoxime    Bipolar disorder  -- Continue home medication regimen: carbamazepime, venlafaxine, risperidone      Type 1 diabetes mellitus with hyperglycemia  Patient's FSGs are uncontrolled due to hyperglycemia on current medication regimen. Current infection also possibly contributing to hyperglycemia.  Last A1c reviewed-   Lab Results   Component Value Date    HGBA1C 11.3 (H) 12/10/2023     Most recent fingerstick glucose reviewed-   Recent Labs   Lab 03/05/24  1506 03/06/24  0647 03/06/24  1101   POCTGLUCOSE 408* 221* 359*       Current correctional scale  Low    Antihyperglycemics (From admission, onward)      Start     Stop Route Frequency Ordered    03/07/24 2100  insulin detemir U-100 (Levemir) pen 20 Units         -- SubQ Nightly 03/07/24 1316    03/07/24 1649  insulin aspart U-100 pen 0-5 Units         -- SubQ Before meals & nightly PRN 03/07/24 1550    03/05/24 1645  insulin aspart U-100 pen 6 Units         -- SubQ 3 times daily with meals 03/05/24 1247             -- Hold oral hyperglycemics  -- SSI, POCT BG qACHS; titrate basal/bolus regimen PRN to 140-180 goal  --  Diabetic diet  -- patient non-compliant with insulin regimen at home      CAD (coronary artery disease)  Hyperlipidemia    Known CAD. Patient not exhibiting ACS symptoms at this time.     Last known LHC done 09/2023:  1.  60% in stent restenosis of previously placed right coronary artery stent.  2.  Successful balloon angioplasty with a 3 0 x 20 fall by 3.5 x 20 noncompliant balloon.  60% stenosis reduced to 0%.  YENI 3 flow.  No dissection.  3.  50% stenosis noted in the mid and distal left anterior descending artery.  There is a 70 % stenosis at the apex, small caliber.    -- atorvastatin 20  -- ASA 81  -- Hold Plavix as patient's >1 year from last PCI    COPD (chronic obstructive pulmonary disease)  Patient's COPD is controlled currently.  Patient is currently off COPD Pathway. Continue scheduled inhalers and monitor respiratory status closely.     Polysubstance use disorder  History of recent methamphetamine, tobacco, alcohol use.     -- Likely contributor to psychiatric disorder and agitation  -- psych following    Hemiparesis affecting right side as late effect of stroke  Noted per history    Anemia of chronic disease  Patient's anemia is currently controlled. Has not received any PRBCs to date. Etiology likely d/t chronic disease due to recurrent infections and inflammation.  Current CBC reviewed-   Lab Results   Component Value Date    HGB 10.8 (L) 03/06/2024    HCT 33.9 (L) 03/06/2024     Monitor serial CBC and transfuse if patient becomes hemodynamically unstable, symptomatic or H/H drops below 7/21.    Primary hypertension  Chronic, controlled. Latest blood pressure and vitals reviewed-     Temp:  [97.5 °F (36.4 °C)-98.4 °F (36.9 °C)]   Pulse:  []   Resp:  [16-18]   BP: (135-172)/(79-94)   SpO2:  [93 %-95 %] .   Home meds for hypertension were reviewed and noted below.   Hypertension Medications               losartan (COZAAR) 50 MG tablet Take 1 tablet (50 mg total) by mouth once daily.     metoprolol succinate (TOPROL-XL) 25 MG 24 hr tablet Take 1 tablet (25 mg total) by mouth once daily.            -- losartan 50    VTE Risk Mitigation (From admission, onward)           Ordered     enoxaparin injection 40 mg  Daily         03/04/24 1904     IP VTE HIGH RISK PATIENT  Once         03/04/24 1904     Place sequential compression device  Until discontinued         03/04/24 1904                    Discharge Planning   ROLA: 3/8/2024     Code Status: Full Code   Is the patient medically ready for discharge?: No    Reason for patient still in hospital (select all that apply): Patient trending condition, Treatment, and Pending disposition  Discharge Plan A: [facility]  Department of Hospital Medicine   [facility]

## 2024-03-08 NOTE — ASSESSMENT & PLAN NOTE
CTAP wc s/o moderate stool burden, diverticula w/o infection.     -- Aggressive bowel regimen  - pending KUB

## 2024-03-08 NOTE — ASSESSMENT & PLAN NOTE
UA with Nitrite+. Unable to discern symptoms d/t poor cooperation during ROS. Febrile to 101.5, although this may be related to COVID+. Previous UCX with largely pan-sensitive Proteus mirabilis.    -- No WBC or bacteria in UA makes acute UTI less certain; however, given Nitrite+ and new fever, will treat as UTI.  - continue on PO cefpodoxime

## 2024-03-08 NOTE — ASSESSMENT & PLAN NOTE
Chronic, controlled. Latest blood pressure and vitals reviewed-     Temp:  [97.4 °F (36.3 °C)-98 °F (36.7 °C)]   Pulse:  []   Resp:  [16-18]   BP: (140-176)/(71-90)   SpO2:  [93 %-98 %] .   Home meds for hypertension were reviewed and noted below.   Hypertension Medications               losartan (COZAAR) 50 MG tablet Take 1 tablet (50 mg total) by mouth once daily.    metoprolol succinate (TOPROL-XL) 25 MG 24 hr tablet Take 1 tablet (25 mg total) by mouth once daily.          - on losartan 50 as IP

## 2024-03-09 LAB
BACTERIA BLD CULT: NORMAL
BACTERIA BLD CULT: NORMAL
POCT GLUCOSE: 130 MG/DL (ref 70–110)
POCT GLUCOSE: 144 MG/DL (ref 70–110)
POCT GLUCOSE: 212 MG/DL (ref 70–110)
POCT GLUCOSE: 233 MG/DL (ref 70–110)
POCT GLUCOSE: 55 MG/DL (ref 70–110)

## 2024-03-09 PROCEDURE — 63600175 PHARM REV CODE 636 W HCPCS

## 2024-03-09 PROCEDURE — 27000207 HC ISOLATION

## 2024-03-09 PROCEDURE — 25000003 PHARM REV CODE 250

## 2024-03-09 PROCEDURE — 63600175 PHARM REV CODE 636 W HCPCS: Mod: JZ,JG

## 2024-03-09 PROCEDURE — 25000003 PHARM REV CODE 250: Performed by: STUDENT IN AN ORGANIZED HEALTH CARE EDUCATION/TRAINING PROGRAM

## 2024-03-09 PROCEDURE — 25000003 PHARM REV CODE 250: Performed by: HOSPITALIST

## 2024-03-09 PROCEDURE — 21400001 HC TELEMETRY ROOM

## 2024-03-09 RX ORDER — NIFEDIPINE 60 MG/1
60 TABLET, EXTENDED RELEASE ORAL DAILY
Status: DISCONTINUED | OUTPATIENT
Start: 2024-03-09 | End: 2024-03-19 | Stop reason: HOSPADM

## 2024-03-09 RX ORDER — INSULIN ASPART 100 [IU]/ML
7 INJECTION, SOLUTION INTRAVENOUS; SUBCUTANEOUS
Status: DISCONTINUED | OUTPATIENT
Start: 2024-03-09 | End: 2024-03-10

## 2024-03-09 RX ORDER — CARBAMAZEPINE 200 MG/1
400 TABLET ORAL DAILY
Status: DISCONTINUED | OUTPATIENT
Start: 2024-03-10 | End: 2024-03-12

## 2024-03-09 RX ORDER — AMLODIPINE BESYLATE 5 MG/1
5 TABLET ORAL DAILY
Status: DISCONTINUED | OUTPATIENT
Start: 2024-03-09 | End: 2024-03-09

## 2024-03-09 RX ORDER — RISPERIDONE 1 MG/1
1 TABLET, ORALLY DISINTEGRATING ORAL 2 TIMES DAILY
Status: DISCONTINUED | OUTPATIENT
Start: 2024-03-09 | End: 2024-03-11

## 2024-03-09 RX ADMIN — VENLAFAXINE HYDROCHLORIDE 75 MG: 75 CAPSULE, EXTENDED RELEASE ORAL at 10:03

## 2024-03-09 RX ADMIN — POLYETHYLENE GLYCOL 3350 17 G: 17 POWDER, FOR SOLUTION ORAL at 03:03

## 2024-03-09 RX ADMIN — FOLIC ACID 1 MG: 1 TABLET ORAL at 10:03

## 2024-03-09 RX ADMIN — LABETALOL HYDROCHLORIDE 10 MG: 5 INJECTION, SOLUTION INTRAVENOUS at 06:03

## 2024-03-09 RX ADMIN — INSULIN ASPART 7 UNITS: 100 INJECTION, SOLUTION INTRAVENOUS; SUBCUTANEOUS at 05:03

## 2024-03-09 RX ADMIN — Medication 100 MG: at 10:03

## 2024-03-09 RX ADMIN — ATORVASTATIN CALCIUM 20 MG: 20 TABLET, FILM COATED ORAL at 10:03

## 2024-03-09 RX ADMIN — SENNOSIDES AND DOCUSATE SODIUM 1 TABLET: 8.6; 5 TABLET ORAL at 10:03

## 2024-03-09 RX ADMIN — ENOXAPARIN SODIUM 40 MG: 40 INJECTION SUBCUTANEOUS at 05:03

## 2024-03-09 RX ADMIN — DEXTROSE MONOHYDRATE 125 ML: 100 INJECTION, SOLUTION INTRAVENOUS at 09:03

## 2024-03-09 RX ADMIN — CEFPODOXIME PROXETIL 200 MG: 200 TABLET, FILM COATED ORAL at 10:03

## 2024-03-09 RX ADMIN — INSULIN ASPART 2 UNITS: 100 INJECTION, SOLUTION INTRAVENOUS; SUBCUTANEOUS at 09:03

## 2024-03-09 RX ADMIN — INSULIN ASPART 2 UNITS: 100 INJECTION, SOLUTION INTRAVENOUS; SUBCUTANEOUS at 12:03

## 2024-03-09 RX ADMIN — NIFEDIPINE 60 MG: 60 TABLET, FILM COATED, EXTENDED RELEASE ORAL at 03:03

## 2024-03-09 RX ADMIN — POLYETHYLENE GLYCOL 3350 17 G: 17 POWDER, FOR SOLUTION ORAL at 10:03

## 2024-03-09 RX ADMIN — INSULIN ASPART 6 UNITS: 100 INJECTION, SOLUTION INTRAVENOUS; SUBCUTANEOUS at 09:03

## 2024-03-09 RX ADMIN — INSULIN ASPART 6 UNITS: 100 INJECTION, SOLUTION INTRAVENOUS; SUBCUTANEOUS at 12:03

## 2024-03-09 RX ADMIN — THERA TABS 1 TABLET: TAB at 10:03

## 2024-03-09 RX ADMIN — LOSARTAN POTASSIUM 50 MG: 50 TABLET, FILM COATED ORAL at 10:03

## 2024-03-09 RX ADMIN — RISPERIDONE 1 MG: 1 TABLET, ORALLY DISINTEGRATING ORAL at 10:03

## 2024-03-09 RX ADMIN — ASPIRIN 81 MG CHEWABLE TABLET 81 MG: 81 TABLET CHEWABLE at 10:03

## 2024-03-09 NOTE — SUBJECTIVE & OBJECTIVE
Interval History: called Mother and Sister for collaterals. No response. Optimized BP medication. Decreased risperidone does given rigidity. Continues on PO cefpodoxime.     Review of Systems   Unable to perform ROS: Psychiatric disorder     Objective:     Vital Signs (Most Recent):  Temp: 97.5 °F (36.4 °C) (03/09/24 1103)  Pulse: 82 (03/09/24 1103)  Resp: 18 (03/09/24 1103)  BP: 124/74 (03/09/24 1103)  SpO2: 95 % (03/09/24 1103) Vital Signs (24h Range):  Temp:  [96.5 °F (35.8 °C)-97.7 °F (36.5 °C)] 97.5 °F (36.4 °C)  Pulse:  [73-90] 82  Resp:  [12-18] 18  SpO2:  [95 %-97 %] 95 %  BP: (119-197)/() 124/74     Weight: 63.5 kg (140 lb)  Body mass index is 18.99 kg/m².    Intake/Output Summary (Last 24 hours) at 3/9/2024 1510  Last data filed at 3/9/2024 0644  Gross per 24 hour   Intake --   Output 300 ml   Net -300 ml         Physical Exam  Constitutional:       Comments: Disheveled    HENT:      Head: Normocephalic.   Eyes:      General: No scleral icterus.     Extraocular Movements: Extraocular movements intact.   Cardiovascular:      Rate and Rhythm: Normal rate and regular rhythm.   Pulmonary:      Effort: No respiratory distress.      Breath sounds: Normal breath sounds. No wheezing or rales.   Abdominal:      General: There is no distension.      Palpations: Abdomen is soft.   Musculoskeletal:      Cervical back: Normal range of motion.   Skin:     General: Skin is warm and dry.      Findings: No rash.   Neurological:      Mental Status: He is alert.   Psychiatric:         Mood and Affect: Affect is labile.         Speech: Speech is delayed.         Behavior: Behavior is slowed.         Cognition and Memory: Cognition is impaired.      Comments: Oriented to person and place. Uncooperative with questioning and physical exam.             Significant Labs: All pertinent labs within the past 24 hours have been reviewed.    Significant Imaging: I have reviewed all pertinent imaging results/findings within the  past 24 hours.

## 2024-03-09 NOTE — ASSESSMENT & PLAN NOTE
Likely 2/2 to drug use, but underlying COVID+ infection and possible UTI may be contributing factors. Utox negative, but reported history of recent methamphetamine use.     -- Patient improving on mental status   -- ED PEC'ed patient prior to admission to .   -- Capacity evaluation QD  -- PRN Zyprexa for non-redirectable agitation / violence  -- Treat underlying infection  -- See Bipolar Disorder A/P for home medications  - called family for collaterals, no response

## 2024-03-09 NOTE — ASSESSMENT & PLAN NOTE
Patient's FSGs are uncontrolled due to hyperglycemia on current medication regimen.  Last A1c reviewed-   Lab Results   Component Value Date    HGBA1C 10.1 (H) 03/07/2024     Most recent fingerstick glucose reviewed-   Recent Labs   Lab 03/08/24  1641 03/08/24  1930 03/09/24  0844 03/09/24  1105   POCTGLUCOSE 179* 190* 233* 212*       Current correctional scale  Low    Antihyperglycemics (From admission, onward)      Start     Stop Route Frequency Ordered    03/09/24 1645  insulin aspart U-100 pen 7 Units         -- SubQ 3 times daily with meals 03/09/24 1432    03/08/24 2100  insulin detemir U-100 (Levemir) pen 13 Units         -- SubQ 2 times daily 03/08/24 2035    03/07/24 1649  insulin aspart U-100 pen 0-5 Units         -- SubQ Before meals & nightly PRN 03/07/24 1550            -- Hold oral hyperglycemics  -- SSI, POCT BG qACHS; titrate basal/bolus regimen PRN to 140-180 goal  -- Diabetic diet  - Detemir 13U BID / Aspart 6U TIDWM + LDSSI   - Current A1C 10.1. Reviewed previous A1C on 11.3  - patient non-compliant with insulin regimen at home

## 2024-03-09 NOTE — PROGRESS NOTES
[facility] Medicine  Progress Note    Patient Name: Cali Mabry  MRN: [id number]  Patient Class: IP- Inpatient   Admission Date: 3/4/2024  Length of Stay: 5 days  Attending Physician: Siddhartha Zavala MD  Primary Care Provider: Lawanda Flores NP        Subjective:     Principal Problem:Acute encephalopathy        HPI:  58 M with DM1, extensive IVDU, HCV Ab+, HTN, previous CVA with R sided deficits, HFpEF, CAD, chronic pancreatitis, mixed mood / psychiatric disorder presenting [place] [facility] [place] with chief complaint of weakness of unspecified duration and onset. Admits to using shooting methamphetamines. Rest of history from patient is non-contributory due to uncooperation and alternating agitation and somnolence. HPI is written with review of previous medical records, ED records, discussed with patient's mother via telephone, who was able to provide limited insight into the patient's acute medical condition. Patient's mother states that patient's PO intake has decreased but he would not cooperate with her nor would he tell her what was bothering him. Patient reported to want to stay in bed for the last 2 days.     Of note, patient was recently discharged from [facility] [place] treatment of DKA and UTI (largely pan-sensitive Proteus mirabilis). In ED, patient was extremely agitated, given multiple doses of Ativan due to agitation, aggressive/abusive statements towards nursing and medical staff. Tachycardic to 110s, hypertensive, febrile to 101.5. Given broad spectrum Abx (Vanc/Zosyn). PEC'ed by ED due to extreme agitation.    Overview/Hospital Course:  Patient admitted for altered mental status. Suspect it was secondary to UTI and COVID infection. UDS negative. Patient was PEC'd while in the ED for agitation. Patients mental status improving. Will consult psychiatry if patient needs psych placement. Finished remdesivir course, not hypoxic. Improving respiratory status. Minor episodes of cough.  Reviewed previous A1C from 2 months on 11.3. uptitrated insulin regimen. Psychiatry following, patient with intermittent explosive disorder, and recommend maintaining current regimen, as well as PEC as he is confused. Current presentation consistent with encephalopathy rather than psychosis or vero. Psych not recommending Psych placement. Once he is back to baseline, they will reassess need for psych placement. Titrating blood glucose. Continues on PO cefpodoxime. Neurology consulted for NPH, recommending outpatient neurosurgery evaluation. Episode of hypertension, controlled wit PRN labetalol. Started on PO amlodipine for BP control. Decreased risperidone dose as per psychiatry recs given patient's tone is increased on examination.     Interval History: called Mother and Sister for collaterals. No response. Optimized BP medication. Decreased risperidone does given rigidity. Continues on PO cefpodoxime.     Review of Systems   Unable to perform ROS: Psychiatric disorder     Objective:     Vital Signs (Most Recent):  Temp: 97.5 °F (36.4 °C) (03/09/24 1103)  Pulse: 82 (03/09/24 1103)  Resp: 18 (03/09/24 1103)  BP: 124/74 (03/09/24 1103)  SpO2: 95 % (03/09/24 1103) Vital Signs (24h Range):  Temp:  [96.5 °F (35.8 °C)-97.7 °F (36.5 °C)] 97.5 °F (36.4 °C)  Pulse:  [73-90] 82  Resp:  [12-18] 18  SpO2:  [95 %-97 %] 95 %  BP: (119-197)/() 124/74     Weight: 63.5 kg (140 lb)  Body mass index is 18.99 kg/m².    Intake/Output Summary (Last 24 hours) at 3/9/2024 1510  Last data filed at 3/9/2024 0644  Gross per 24 hour   Intake --   Output 300 ml   Net -300 ml         Physical Exam  Constitutional:       Comments: Disheveled    HENT:      Head: Normocephalic.   Eyes:      General: No scleral icterus.     Extraocular Movements: Extraocular movements intact.   Cardiovascular:      Rate and Rhythm: Normal rate and regular rhythm.   Pulmonary:      Effort: No respiratory distress.      Breath sounds: Normal breath sounds. No  wheezing or rales.   Abdominal:      General: There is no distension.      Palpations: Abdomen is soft.   Musculoskeletal:      Cervical back: Normal range of motion.   Skin:     General: Skin is warm and dry.      Findings: No rash.   Neurological:      Mental Status: He is alert.   Psychiatric:         Mood and Affect: Affect is labile.         Speech: Speech is delayed.         Behavior: Behavior is slowed.         Cognition and Memory: Cognition is impaired.      Comments: Oriented to person and place. Uncooperative with questioning and physical exam.             Significant Labs: All pertinent labs within the past 24 hours have been reviewed.    Significant Imaging: I have reviewed all pertinent imaging results/findings within the past 24 hours.    Assessment/Plan:      * Acute encephalopathy  Likely 2/2 to drug use, but underlying COVID+ infection and possible UTI may be contributing factors. Utox negative, but reported history of recent methamphetamine use.     -- Patient improving on mental status   -- ED PEC'ed patient prior to admission to .   -- Capacity evaluation QD  -- PRN Zyprexa for non-redirectable agitation / violence  -- Treat underlying infection  -- See Bipolar Disorder A/P for home medications  - called family for collaterals, no response    Constipation  CTAP wc s/o moderate stool burden, diverticula w/o infection.     -- Aggressive bowel regimen  - pending KUB      COVID  Patient presenting with incidental COVID+ 03/04/2024. No symptoms of dyspnea, shortness of breath. Presenting for unspecified weakness. No hypoxemia.  COVID-19 testing:   Isolation: Airborne/Droplet. Surgical mask on patient. Notify Infection Control. Finished remdesivir management. No steroids given not hypoxemic.     -- No O2 requirement at this time  -- COVID risk score: 5; will initiate High-Risk Remdesivir 3 day course  -- No role for Dexamethasone d/t normal SpO2 on RA  -- No role for CAP coverage d/t lack of focal  consolidation on CXR  -- No role for therapeutic AC d/t normal SpO2 on RA  -- Acetaminophen 650mg PO Q6hr PRN fever/headache  -- IVF if indicated, restrictive strategy preferred, no maintenance IV if able  -- antitussives: dextromethorphan/guaifenesin, tessalon PRN  -- albuterol: 2-4 puff q6h if patient wheezing        Acute cystitis without hematuria  UA with Nitrite+. Unable to discern symptoms d/t poor cooperation during ROS. Febrile to 101.5, although this may be related to COVID+. Previous UCX with largely pan-sensitive Proteus mirabilis.    -- No WBC or bacteria in UA makes acute UTI less certain; however, given Nitrite+ and new fever, will treat as UTI.  - continue on PO cefpodoxime     Bipolar disorder  -- Continue home medication regimen: carbamazepime, venlafaxine, risperidone  - decreased risperidone dose given rigidity        Intermittent explosive disorder  Psychiatry following, patient with intermittent explosive disorder, and recommend maintaining current regimen, as well as PEC as he is confused. Current presentation consistent with encephalopathy rather than psychosis or vero. Psych not recommending Psych placement. Once he is back to baseline, they will reassess need for psych placement.       Type 1 diabetes mellitus with hyperglycemia  Patient's FSGs are uncontrolled due to hyperglycemia on current medication regimen.  Last A1c reviewed-   Lab Results   Component Value Date    HGBA1C 10.1 (H) 03/07/2024     Most recent fingerstick glucose reviewed-   Recent Labs   Lab 03/08/24  1641 03/08/24  1930 03/09/24  0844 03/09/24  1105   POCTGLUCOSE 179* 190* 233* 212*       Current correctional scale  Low    Antihyperglycemics (From admission, onward)      Start     Stop Route Frequency Ordered    03/09/24 1645  insulin aspart U-100 pen 7 Units         -- SubQ 3 times daily with meals 03/09/24 1432    03/08/24 2100  insulin detemir U-100 (Levemir) pen 13 Units         -- SubQ 2 times daily 03/08/24 2035     03/07/24 1649  insulin aspart U-100 pen 0-5 Units         -- SubQ Before meals & nightly PRN 03/07/24 1550            -- Hold oral hyperglycemics  -- SSI, POCT BG qACHS; titrate basal/bolus regimen PRN to 140-180 goal  -- Diabetic diet  - Detemir 13U BID / Aspart 6U TIDWM + LDSSI   - Current A1C 10.1. Reviewed previous A1C on 11.3  - patient non-compliant with insulin regimen at home     CAD (coronary artery disease)  Hyperlipidemia    Known CAD. Patient not exhibiting ACS symptoms at this time.     Last known LHC done 09/2023:  1.  60% in stent restenosis of previously placed right coronary artery stent.  2.  Successful balloon angioplasty with a 3 0 x 20 fall by 3.5 x 20 noncompliant balloon.  60% stenosis reduced to 0%.  YENI 3 flow.  No dissection.  3.  50% stenosis noted in the mid and distal left anterior descending artery.  There is a 70 % stenosis at the apex, small caliber.    -- Continue HI Statin  -- Continue ASA 81  -- Hold Plavix as patient's > 1 year from last PCI    COPD (chronic obstructive pulmonary disease)  Patient's COPD is controlled currently.  Patient is currently off COPD Pathway.     -PRN inhalers    Sepsis  Currently not having sepsis criteria. Positive nitrates on admission. On 5 day course antibiotic.     - Continue on PO cefpodoxime     Polysubstance use disorder  History of recent methamphetamine, tobacco, alcohol use. Likely contributor to psychiatric disorder and agitation      Hemiparesis affecting right side as late effect of stroke  Noted per history    Anemia of chronic disease  Patient's anemia is currently controlled. Has not received any PRBCs to date. Etiology likely d/t chronic disease due to recurrent infections, inflammation  Current CBC reviewed-   Lab Results   Component Value Date    HGB 12.2 (L) 03/07/2024    HCT 38.7 (L) 03/07/2024     Monitor serial CBC and transfuse if patient becomes hemodynamically unstable, symptomatic or H/H drops below 7/21.    Primary  hypertension  Chronic, controlled. Latest blood pressure and vitals reviewed-     Temp:  [96.5 °F (35.8 °C)-97.7 °F (36.5 °C)]   Pulse:  [73-90]   Resp:  [12-18]   BP: (119-197)/()   SpO2:  [95 %-97 %] .   Home meds for hypertension were reviewed and noted below.   Hypertension Medications               losartan (COZAAR) 50 MG tablet Take 1 tablet (50 mg total) by mouth once daily.    metoprolol succinate (TOPROL-XL) 25 MG 24 hr tablet Take 1 tablet (25 mg total) by mouth once daily.          - on losartan 50 as IP   - PRN labetalol       VTE Risk Mitigation (From admission, onward)           Ordered     enoxaparin injection 40 mg  Daily         03/04/24 1904     IP VTE HIGH RISK PATIENT  Once         03/04/24 1904     Place sequential compression device  Until discontinued         03/04/24 1904                    Discharge Planning   ROLA: 3/8/2024     Code Status: Full Code   Is the patient medically ready for discharge?: No    Reason for patient still in hospital (select all that apply): Patient trending condition  Discharge Plan A: [facility] [place]          Kaleb Monson MD  Department of Hospital Medicine   [facility]

## 2024-03-09 NOTE — ASSESSMENT & PLAN NOTE
-- Continue home medication regimen: carbamazepime, venlafaxine, risperidone  - decreased risperidone dose given rigidity

## 2024-03-09 NOTE — ASSESSMENT & PLAN NOTE
Chronic, controlled. Latest blood pressure and vitals reviewed-     Temp:  [96.5 °F (35.8 °C)-97.7 °F (36.5 °C)]   Pulse:  [73-90]   Resp:  [12-18]   BP: (119-197)/()   SpO2:  [95 %-97 %] .   Home meds for hypertension were reviewed and noted below.   Hypertension Medications               losartan (COZAAR) 50 MG tablet Take 1 tablet (50 mg total) by mouth once daily.    metoprolol succinate (TOPROL-XL) 25 MG 24 hr tablet Take 1 tablet (25 mg total) by mouth once daily.          - on losartan 50 as IP   - PRN labetalol

## 2024-03-09 NOTE — PROGRESS NOTES
"CONSULTATION LIAISON PSYCHIATRY PROGRESS NOTE    Patient Name: Cali Mabry  MRN: [id number]  Patient Class: IP- Inpatient  Admission Date: 3/4/2024  Attending Physician: Siddhartha Zavala MD      SUBJECTIVE:   Cali Mabry is a 58 y.o. 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 UDS negative, Carbamazepine level undetectable. PEC placed in ED due to combative behavior and confusion. Patient was found to be COVID+ and have UTI. He was admitted to the hospital for Acute encephalopathy. Patient received Zyprexa 5 mg IM on 3/5 @ 1539.    Psychiatry consulted for "patient with history of Bipolar and substance use disorder, currently PEC'd. Consult for psych evaluation and need for placement in psych facility?"     Patient did not receive any psychotropic PRNs overnight. Compliant with PO medications.    Today, patient seen at bedside with nursing present. Nursing adjusts patient in bed and patient given morning medications. Patient is somnolent and difficult to wake. He follows command to open eyes initially then does not follow commands moving forward. Speech is unintelligible as the patient continuously makes a groaning sound. He does not respond to questions regarding orientation or physical complaints.     Attempted to complete physical exam to determine rigidity but active resistance to movement appreciated on exam.     OBJECTIVE:    Mental Status Exam:  General Appearance: dressed in hospital garb, in no acute distress, thin and gaunt, disheveled, malodorous  Behavior:  unable to assess  Involuntary Movements and Motor Activity: no abnormal involuntary movements noted; no tics, no tremors, no akathisia, no dystonia, no evidence of tardive " dyskinesia; no psychomotor agitation or retardation  Gait and Station: unable to assess - patient lying down or seated  Speech and Language: slurred, unintelligible  Mood: unable to assess  Affect: dysphoric  Thought Process and Associations: Unable to Assess  Thought Content and Perceptions:: Unable to Assess  Sensorium and Orientation:  somnolent Unable to Assess orientation  Recent and Remote Memory: Unable to  Formally Assess  Attention and Concentration: Unable to Formally Assess  Fund of Knowledge: Unable to Formally Assess  Insight: limited, limited/partial awareness of illness  Judgment: behavior is adequate/appropriate to circumstances    CAM ICU positive? Unable to formally assess      ASSESSMENT & RECOMMENDATIONS   Acute Encephalopathy   R/O NPH  Intermittent Explosive Disorder  Hx TBI  Hx CVA  Hx Unspecified Neurocognitive Disorder  Hx Bipolar Disorder  Hx MDD        Intermittent Explosive Disorder  Hx Bipolar Disorder  Hx MDD  PSYCH MEDICATIONS  Scheduled -  Change Risperdal to 1 mg PO BID  Continue Tegretol 400 mg daily  Continue Effexor XR 75 mg PO daily  PRN  Continue Zyprexa 5 mg PO or IM for non-redirectable agitation associated with breakthrough psychosis or vero     DELIRIUM  Recommended Neurology Consult for NPH R/O   Given acute mental status change, gait instability per collateral ~2-3 months, possible urinary incontinence and CT Head results from 3/4 with ventriculomegaly out of proportion to the degree of sulcal enlargement  Per Neuro, no interventions or investigations to be initiated in the inpatient setting.  DELIRIUM BEHAVIOR MANAGEMENT  PLEASE utilize CHEMICAL restraints with PRN meds first for agitation. Minimize use of PHYSICAL restraints OR have periods of being out of physical restraints if possible.  Keep window shades open and room lit during day and room dim at night in order to promote normal sleep-wake cycles  Encourage family at bedside. [place] patient often to situation,  location, date.  Continue to Limit or Discontinue use of Narcotics, Benzos and Anti-cholinergic medications as they may worsen delirium.  Continue medical workup for causative etiology of Delirium.      RISK ASSESSMENT  NEEDS CEC because patient is in imminent danger of hurting self or others and is gravely disabled. & NEEDS 1:1 sitter     FOLLOW UP  Will follow up while in house     DISPOSITION - once medically cleared:   Will reassess daily    Please contact ON CALL psychiatry service (24/7) for any acute issues that may arise.    Dr. Martha Storey   Psychiatry  [facility]  3/9/2024 8:27 AM        --------------------------------------------------------------------------------------------------------------------------------------------------------------------------------------------------------------------------------------    CONTINUED OBJECTIVE clinical data & findings reviewed and noted for above decision making    Current Medications:   Scheduled Meds:    aspirin  81 mg Oral Daily    atorvastatin  20 mg Oral Daily    cefpodoxime  200 mg Oral Q12H    enoxparin  40 mg Subcutaneous Daily    folic acid  1 mg Oral Daily    insulin aspart U-100  6 Units Subcutaneous TIDWM    insulin detemir U-100 (Levemir)  13 Units Subcutaneous BID    losartan  50 mg Oral Daily    melatonin  6 mg Oral Nightly    multivitamin  1 tablet Oral Daily    polyethylene glycol  17 g Oral Q6H WAKE    risperiDONE  1 mg Oral Daily    risperiDONE  2 mg Oral QHS    senna-docusate 8.6-50 mg  1 tablet Oral BID    thiamine  100 mg Oral Daily    venlafaxine  75 mg Oral Daily     PRN Meds: albuterol, benzonatate, dextromethorphan-guaiFENesin  mg/5 ml, dextrose 10%, dextrose 10%, glucagon (human recombinant), glucose, glucose, insulin aspart U-100, labetalol, naloxone, ondansetron, sodium chloride 0.9%    Allergies:   Review of patient's allergies indicates:  No Known Allergies    Vitals  Vitals:    03/09/24 0630   BP: (!)  159/83   Pulse: 73   Resp:    Temp:        Labs/Imaging/Studies:  Recent Results (from the past 24 hour(s))   POCT glucose    Collection Time: 03/08/24  9:10 AM   Result Value Ref Range    POCT Glucose 342 (H) 70 - 110 mg/dL   POCT glucose    Collection Time: 03/08/24 11:48 AM   Result Value Ref Range    POCT Glucose 282 (H) 70 - 110 mg/dL   POCT glucose    Collection Time: 03/08/24  4:41 PM   Result Value Ref Range    POCT Glucose 179 (H) 70 - 110 mg/dL   POCT glucose    Collection Time: 03/08/24  7:30 PM   Result Value Ref Range    POCT Glucose 190 (H) 70 - 110 mg/dL     Imaging Results              CT Head Without Contrast (Final result)  Result time 03/04/24 16:55:45      Final result by Jered Cheatham MD (03/04/24 16:55:45)                   Impression:      No evidence of acute hemorrhage or major vascular distribution infarct.    Chronic small vessel ischemic change with remote right occipital and left cerebellar infarcts.    Ventriculomegaly out of proportion to the degree of sulcal enlargement, similar to prior exams.  While this could relate to cerebral volume loss, the configuration does raise the possibility of normal pressure hydrocephalus. Clinical correlation required.      Electronically signed by: Jered Cheatham MD  Date:    03/04/2024  Time:    16:55               Narrative:    EXAMINATION:  CT HEAD WITHOUT CONTRAST    CLINICAL HISTORY:  Mental status change, unknown cause;    TECHNIQUE:  Low dose axial CT images obtained throughout the head without the use of intravenous contrast.  Axial, sagittal and coronal reconstructions were performed.    COMPARISON:  12/10/2023.    FINDINGS:  Intracranial compartment:    Ventricles are enlarged, similar to prior.  Third ventricle diameter again measuring up to 1.4 cm.  Ventricular enlargement out of proportion of degree of sulcal enlargement.    Chronic small vessel ischemic change in the supratentorial white matter.  Remote right occipital infarct.   Remote left cerebellar infarct.  No acute major vascular distribution infarct.  No acute parenchymal hemorrhage.  No new intracranial mass effect or midline shift.    No extra-axial blood or fluid collections.    Skull/extracranial contents (limited evaluation):    No displaced calvarial fracture.    Mastoid air cells are clear. Mild patchy mucosal thickening in the visualized paranasal sinuses.                                       CT Abdomen Pelvis With IV Contrast NO Oral Contrast (Final result)  Result time 03/04/24 18:05:52      Final result by New Falcon MD (03/04/24 18:05:52)                   Impression:      Moderate colonic stool burden with mild wall thickening of the rectum, correlate with signs of constipation or proctitis.    Diffuse bladder wall thickening, correlate with urinalysis and symptoms of cystitis.    Diverticulosis coli without evidence of acute diverticulitis    Stable 5 mm right middle lobe solid pulmonary micronodule.    Additional findings as above.    Electronically signed by resident: Marsha Cardona  Date:    03/04/2024  Time:    17:27    Electronically signed by: New Falcon MD  Date:    03/04/2024  Time:    18:05               Narrative:    EXAMINATION:  CT ABDOMEN PELVIS WITH IV CONTRAST    CLINICAL HISTORY:  Abdominal abscess/infection suspected;    TECHNIQUE:  Axial images of the abdomen and pelvis were acquired after administration of 75 cc Omnipaque 350 IV contrast. No oral contrast was administered.  Coronal and sagittal reconstructions were also obtained.    COMPARISON:  CT 12/12/2023 and 07/26/2023.    FINDINGS:  Examination degraded by patient motion artifact.    Heart: Normal heart size.  No pericardial fluid.  No significant calcific coronary atherosclerosis.    Lungs: Evaluation of the lung bases limited due to respiratory motion.  Allowing for this, stable linear bandlike opacities of the right lung base suggestive of atelectasis/scarring.  Stable 5 mm right middle  lobe solid pulmonary micronodule (2-7).  No new focal consolidation.  No pleural effusion or pneumothorax.    Liver: Normal in size and contour.  No focal hepatic lesion.    Gallbladder: No calcified gallstones.    Bile Ducts: No biliary ductal dilatation.    Pancreas: No mass or peripancreatic fat stranding.    Spleen: Spleen is upper limits of normal in size measuring 12 cm.    Adrenals: No significant abnormalities.    Kidneys/Ureters: Normal in size and location. Normal enhancement. No hydronephrosis or nephrolithiasis. No ureteral dilatation.    Bladder: Diffuse bladder wall thickening.    Reproductive organs: Prostate is unremarkable.    Peritoneum: No free air or free fluid.    Retroperitoneum: No pathologically enlarged retroperitoneal lymph nodes.    Bowel/Mesentery: Small hiatal hernia.  Stomach is otherwise unremarkable.  Small bowel is normal in caliber without evidence of inflammation or obstruction.  Moderate colonic stool burden.  Mild wall thickening of the rectum with large fecal burden, component of proctitis to be considered.  Scattered colonic diverticuli.  Appendix not definitively visualized.  No steve-cecal soft tissue stranding.    Abdominal wall:  Small fat containing umbilical hernia. 4.7 x 1.8 cm fat containing lesion along the left anterolateral chest wall, favored to represent a lipoma.    Vasculature: Mild calcific atherosclerosis of the abdominal aorta and its branches.  No aneurysm.    Bones: Mild degenerative changes spine.  Prominent Schmorl's node at L1, unchanged.  Healing left anterolateral rib fractures.  No acute fractures.  Lucent lesion within the right femoral neck, stable dating back to multiple priors.                                       X-Ray Chest AP Portable (Final result)  Result time 03/04/24 13:37:35      Final result by Cullen Bush MD (03/04/24 13:37:35)                   Impression:      See above      Electronically signed by: Cullen Bush  MD  Date:    03/04/2024  Time:    13:37               Narrative:    EXAMINATION:  XR CHEST AP PORTABLE    CLINICAL HISTORY:  Sepsis;    TECHNIQUE:  Single frontal view of the chest was performed.    COMPARISON:  None 12/22/2023    FINDINGS:  Heart size normal.  The lungs are clear.  No pleural effusion

## 2024-03-10 LAB
POCT GLUCOSE: 121 MG/DL (ref 70–110)
POCT GLUCOSE: 240 MG/DL (ref 70–110)
POCT GLUCOSE: 91 MG/DL (ref 70–110)
POCT GLUCOSE: 98 MG/DL (ref 70–110)

## 2024-03-10 PROCEDURE — 21400001 HC TELEMETRY ROOM

## 2024-03-10 PROCEDURE — 25000003 PHARM REV CODE 250

## 2024-03-10 PROCEDURE — 25000003 PHARM REV CODE 250: Performed by: HOSPITALIST

## 2024-03-10 PROCEDURE — 99232 SBSQ HOSP IP/OBS MODERATE 35: CPT | Mod: GT,,, | Performed by: STUDENT IN AN ORGANIZED HEALTH CARE EDUCATION/TRAINING PROGRAM

## 2024-03-10 PROCEDURE — 63600175 PHARM REV CODE 636 W HCPCS

## 2024-03-10 PROCEDURE — 27000207 HC ISOLATION

## 2024-03-10 PROCEDURE — 25000003 PHARM REV CODE 250: Performed by: STUDENT IN AN ORGANIZED HEALTH CARE EDUCATION/TRAINING PROGRAM

## 2024-03-10 RX ORDER — INSULIN ASPART 100 [IU]/ML
5 INJECTION, SOLUTION INTRAVENOUS; SUBCUTANEOUS
Status: DISCONTINUED | OUTPATIENT
Start: 2024-03-10 | End: 2024-03-11

## 2024-03-10 RX ADMIN — ATORVASTATIN CALCIUM 20 MG: 20 TABLET, FILM COATED ORAL at 09:03

## 2024-03-10 RX ADMIN — INSULIN ASPART 2 UNITS: 100 INJECTION, SOLUTION INTRAVENOUS; SUBCUTANEOUS at 05:03

## 2024-03-10 RX ADMIN — CARBAMAZEPINE 400 MG: 200 TABLET ORAL at 09:03

## 2024-03-10 RX ADMIN — POLYETHYLENE GLYCOL 3350 17 G: 17 POWDER, FOR SOLUTION ORAL at 09:03

## 2024-03-10 RX ADMIN — SENNOSIDES AND DOCUSATE SODIUM 1 TABLET: 8.6; 5 TABLET ORAL at 09:03

## 2024-03-10 RX ADMIN — Medication 6 MG: at 09:03

## 2024-03-10 RX ADMIN — FOLIC ACID 1 MG: 1 TABLET ORAL at 09:03

## 2024-03-10 RX ADMIN — Medication 100 MG: at 09:03

## 2024-03-10 RX ADMIN — ASPIRIN 81 MG CHEWABLE TABLET 81 MG: 81 TABLET CHEWABLE at 09:03

## 2024-03-10 RX ADMIN — RISPERIDONE 1 MG: 1 TABLET, ORALLY DISINTEGRATING ORAL at 09:03

## 2024-03-10 RX ADMIN — CEFPODOXIME PROXETIL 200 MG: 200 TABLET, FILM COATED ORAL at 09:03

## 2024-03-10 RX ADMIN — THERA TABS 1 TABLET: TAB at 09:03

## 2024-03-10 RX ADMIN — INSULIN ASPART 5 UNITS: 100 INJECTION, SOLUTION INTRAVENOUS; SUBCUTANEOUS at 05:03

## 2024-03-10 RX ADMIN — POLYETHYLENE GLYCOL 3350 17 G: 17 POWDER, FOR SOLUTION ORAL at 02:03

## 2024-03-10 RX ADMIN — ENOXAPARIN SODIUM 40 MG: 40 INJECTION SUBCUTANEOUS at 05:03

## 2024-03-10 RX ADMIN — VENLAFAXINE HYDROCHLORIDE 75 MG: 75 CAPSULE, EXTENDED RELEASE ORAL at 09:03

## 2024-03-10 RX ADMIN — LOSARTAN POTASSIUM 50 MG: 50 TABLET, FILM COATED ORAL at 09:03

## 2024-03-10 RX ADMIN — NIFEDIPINE 60 MG: 60 TABLET, FILM COATED, EXTENDED RELEASE ORAL at 09:03

## 2024-03-10 NOTE — ASSESSMENT & PLAN NOTE
Patient's FSGs are uncontrolled due to hyperglycemia on current medication regimen.  Last A1c reviewed-   Lab Results   Component Value Date    HGBA1C 10.1 (H) 03/07/2024     Most recent fingerstick glucose reviewed-   Recent Labs   Lab 03/09/24 2002 03/09/24  2214 03/10/24  0716 03/10/24  1148   POCTGLUCOSE 55* 130* 91 98       Current correctional scale  Low    Antihyperglycemics (From admission, onward)      Start     Stop Route Frequency Ordered    03/10/24 1130  insulin aspart U-100 pen 5 Units         -- SubQ 3 times daily with meals 03/10/24 0757    03/08/24 2100  insulin detemir U-100 (Levemir) pen 13 Units         -- SubQ 2 times daily 03/08/24 2035 03/07/24 1649  insulin aspart U-100 pen 0-5 Units         -- SubQ Before meals & nightly PRN 03/07/24 1550            -- Hold oral hyperglycemics  -- SSI, POCT BG qACHS; titrate basal/bolus regimen PRN to 140-180 goal  -- Diabetic diet  - Detemir 13U BID / Aspart 5U TIDWM + LDSSI   - Current A1C 10.1. Reviewed previous A1C on 11.3  - patient non-compliant with insulin regimen at home

## 2024-03-10 NOTE — ASSESSMENT & PLAN NOTE
Currently not having sepsis criteria. Positive nitrates on admission. On 5 day course antibiotic.     - Discontinue cefpodoxime. Finished 7 day course.

## 2024-03-10 NOTE — PROGRESS NOTES
[facility] Medicine  Progress Note    Patient Name: Cali Mabry  MRN: [id number]  Patient Class: IP- Inpatient   Admission Date: 3/4/2024  Length of Stay: 6 days  Attending Physician: Siddhartha Zavala MD  Primary Care Provider: Lawanda Flores NP        Subjective:     Principal Problem:Acute encephalopathy        HPI:  58 M with DM1, extensive IVDU, HCV Ab+, HTN, previous CVA with R sided deficits, HFpEF, CAD, chronic pancreatitis, mixed mood / psychiatric disorder presenting to [facility] ED with chief complaint of weakness of unspecified duration and onset. Admits to using shooting methamphetamines. Rest of history from patient is non-contributory due to uncooperation and alternating agitation and somnolence. HPI is written with review of previous medical records, ED records, discussed with patient's mother via telephone, who was able to provide limited insight into the patient's acute medical condition. Patient's mother states that patient's PO intake has decreased but he would not cooperate with her nor would he tell her what was bothering him. Patient reported to want to stay in bed for the last 2 days.     Of note, patient was recently discharged from [facility] following treatment of DKA and UTI (largely pan-sensitive Proteus mirabilis). In ED, patient was extremely agitated, given multiple doses of Ativan due to agitation, aggressive/abusive statements towards nursing and medical staff. Tachycardic to 110s, hypertensive, febrile to 101.5. Given broad spectrum Abx (Vanc/Zosyn). PEC'ed by ED due to extreme agitation.    Overview/Hospital Course:  Patient admitted for altered mental status. Suspect it was secondary to UTI and COVID infection. UDS negative. Patient was PEC'd while in the ED for agitation. Patients mental status improving. Will consult psychiatry if patient needs psych placement. Finished remdesivir course, not hypoxic. Improving respiratory status. Minor episodes of cough.  Reviewed previous A1C from 2 months on 11.3. uptitrated insulin regimen. Psychiatry following, patient with intermittent explosive disorder, and recommend maintaining current regimen, as well as PEC as he is confused. Current presentation consistent with encephalopathy rather than psychosis or vero. Psych not recommending Psych placement. Once he is back to baseline, they will reassess need for psych placement. Titrating blood glucose. Continues on PO cefpodoxime. Neurology consulted for NPH, recommending outpatient neurosurgery evaluation. Episode of hypertension, controlled wit PRN labetalol. Started on PO amlodipine for BP control. Decreased risperidone dose as per psychiatry recs given patient's tone is increased on examination. Hypoglycemic overnight, started on D10 bolus, improved. Will reach out again to family for new collaterals on patient baseline status.    Interval History: mentation improving. Patient engages in conversation with primary team. No flight of ideas. Slow though process. Decreased risperidone dose to improve tone. Reaching out family for collaterals.      Review of Systems   Unable to perform ROS: Psychiatric disorder     Objective:     Vital Signs (Most Recent):  Temp: 98.2 °F (36.8 °C) (03/10/24 1104)  Pulse: 90 (03/10/24 1104)  Resp: 20 (03/10/24 1104)  BP: 121/73 (03/10/24 1104)  SpO2: (!) 94 % (03/10/24 1104) Vital Signs (24h Range):  Temp:  [97.2 °F (36.2 °C)-98.2 °F (36.8 °C)] 98.2 °F (36.8 °C)  Pulse:  [77-90] 90  Resp:  [12-20] 20  SpO2:  [94 %-97 %] 94 %  BP: (121-159)/(73-91) 121/73     Weight: 63.5 kg (140 lb)  Body mass index is 18.99 kg/m².    Intake/Output Summary (Last 24 hours) at 3/10/2024 1342  Last data filed at 3/10/2024 0531  Gross per 24 hour   Intake 125 ml   Output 300 ml   Net -175 ml         Physical Exam  Constitutional:       Comments: Disheveled    HENT:      Head: Normocephalic.   Eyes:      General: No scleral icterus.     Extraocular Movements: Extraocular  movements intact.   Cardiovascular:      Rate and Rhythm: Normal rate and regular rhythm.   Pulmonary:      Effort: No respiratory distress.      Breath sounds: Normal breath sounds. No wheezing or rales.   Abdominal:      General: There is no distension.      Palpations: Abdomen is soft.   Musculoskeletal:      Cervical back: Normal range of motion.   Skin:     General: Skin is warm and dry.      Findings: No rash.   Neurological:      Mental Status: He is alert.   Psychiatric:         Mood and Affect: Affect is labile.         Speech: Speech is delayed.         Behavior: Behavior is slowed.         Cognition and Memory: Cognition is impaired.      Comments: Oriented to person and place. Uncooperative with questioning and physical exam.             Significant Labs: All pertinent labs within the past 24 hours have been reviewed.    Significant Imaging: I have reviewed all pertinent imaging results/findings within the past 24 hours.    Assessment/Plan:      * Acute encephalopathy  Likely 2/2 to drug use, but underlying COVID+ infection and possible UTI may be contributing factors. Utox negative, but reported history of recent methamphetamine use.     -- Patient improving on mental status   -- ED PEC'ed patient prior to admission to .   -- Capacity evaluation QD  -- PRN Zyprexa for non-redirectable agitation / violence  -- Treat underlying infection  -- See Bipolar Disorder A/P for home medications  - called family for collaterals, no response    Constipation  CTAP wc s/o moderate stool burden, diverticula w/o infection.     -- Aggressive bowel regimen  - pending KUB      COVID  Patient presenting with incidental COVID+ 03/04/2024. No symptoms of dyspnea, shortness of breath. Presenting for unspecified weakness. No hypoxemia.  COVID-19 testing:   Isolation: Airborne/Droplet. Surgical mask on patient. Notify Infection Control. Finished remdesivir management. No steroids given not hypoxemic.     -- No O2 requirement  at this time  -- COVID risk score: 5; will initiate High-Risk Remdesivir 3 day course  -- No role for Dexamethasone d/t normal SpO2 on RA  -- No role for CAP coverage d/t lack of focal consolidation on CXR  -- No role for therapeutic AC d/t normal SpO2 on RA  -- Acetaminophen 650mg PO Q6hr PRN fever/headache  -- IVF if indicated, restrictive strategy preferred, no maintenance IV if able  -- antitussives: dextromethorphan/guaifenesin, tessalon PRN  -- albuterol: 2-4 puff q6h if patient wheezing        Acute cystitis without hematuria  UA with Nitrite+. Unable to discern symptoms d/t poor cooperation during ROS. Febrile to 101.5, although this may be related to COVID+. Previous UCX with largely pan-sensitive Proteus mirabilis.    -- No WBC or bacteria in UA makes acute UTI less certain; however, given Nitrite+ and new fever, will treat as UTI.  - discontinue cefpodoxime     Bipolar disorder  -- Continue home medication regimen: carbamazepime, venlafaxine, risperidone  - decreased risperidone dose given rigidity        Intermittent explosive disorder  Psychiatry following, patient with intermittent explosive disorder, and recommend maintaining current regimen, as well as PEC as he is confused. Current presentation consistent with encephalopathy rather than psychosis or vero. Psych not recommending Psych placement. Once he is back to baseline, they will reassess need for psych placement.       Type 1 diabetes mellitus with hyperglycemia  Patient's FSGs are uncontrolled due to hyperglycemia on current medication regimen.  Last A1c reviewed-   Lab Results   Component Value Date    HGBA1C 10.1 (H) 03/07/2024     Most recent fingerstick glucose reviewed-   Recent Labs   Lab 03/09/24 2002 03/09/24  2214 03/10/24  0716 03/10/24  1148   POCTGLUCOSE 55* 130* 91 98       Current correctional scale  Low    Antihyperglycemics (From admission, onward)      Start     Stop Route Frequency Ordered    03/10/24 1130  insulin aspart  U-100 pen 5 Units         -- SubQ 3 times daily with meals 03/10/24 0757    03/08/24 2100  insulin detemir U-100 (Levemir) pen 13 Units         -- SubQ 2 times daily 03/08/24 2035 03/07/24 1649  insulin aspart U-100 pen 0-5 Units         -- SubQ Before meals & nightly PRN 03/07/24 1550            -- Hold oral hyperglycemics  -- SSI, POCT BG qACHS; titrate basal/bolus regimen PRN to 140-180 goal  -- Diabetic diet  - Detemir 13U BID / Aspart 5U TIDWM + LDSSI   - Current A1C 10.1. Reviewed previous A1C on 11.3  - patient non-compliant with insulin regimen at home     CAD (coronary artery disease)  Hyperlipidemia    Known CAD. Patient not exhibiting ACS symptoms at this time.     Last known LHC done 09/2023:  1.  60% in stent restenosis of previously placed right coronary artery stent.  2.  Successful balloon angioplasty with a 3 0 x 20 fall by 3.5 x 20 noncompliant balloon.  60% stenosis reduced to 0%.  YENI 3 flow.  No dissection.  3.  50% stenosis noted in the mid and distal left anterior descending artery.  There is a 70 % stenosis at the apex, small caliber.    -- Continue HI Statin  -- Continue ASA 81  -- Hold Plavix as patient's > 1 year from last PCI    COPD (chronic obstructive pulmonary disease)  Patient's COPD is controlled currently.  Patient is currently off COPD Pathway.     -PRN inhalers    Sepsis  Currently not having sepsis criteria. Positive nitrates on admission. On 5 day course antibiotic.     - Discontinue cefpodoxime. Finished 7 day course.     Polysubstance use disorder  History of recent methamphetamine, tobacco, alcohol use. Likely contributor to psychiatric disorder and agitation      Hemiparesis affecting right side as late effect of stroke  Noted per history    Anemia of chronic disease  Patient's anemia is currently controlled. Has not received any PRBCs to date. Etiology likely d/t chronic disease due to recurrent infections, inflammation  Current CBC reviewed-   Lab Results   Component  Value Date    HGB 12.2 (L) 03/07/2024    HCT 38.7 (L) 03/07/2024     Monitor serial CBC and transfuse if patient becomes hemodynamically unstable, symptomatic or H/H drops below 7/21.    Primary hypertension  Chronic, controlled. Latest blood pressure and vitals reviewed-     Temp:  [97.2 °F (36.2 °C)-98.2 °F (36.8 °C)]   Pulse:  [77-90]   Resp:  [12-20]   BP: (121-159)/(73-91)   SpO2:  [94 %-97 %] .   Home meds for hypertension were reviewed and noted below.   Hypertension Medications               losartan (COZAAR) 50 MG tablet Take 1 tablet (50 mg total) by mouth once daily.    metoprolol succinate (TOPROL-XL) 25 MG 24 hr tablet Take 1 tablet (25 mg total) by mouth once daily.          - on losartan 50 as IP   - PRN labetalol       VTE Risk Mitigation (From admission, onward)           Ordered     enoxaparin injection 40 mg  Daily         03/04/24 1904     IP VTE HIGH RISK PATIENT  Once         03/04/24 1904     Place sequential compression device  Until discontinued         03/04/24 1904                    Discharge Planning   ROLA: 3/8/2024     Code Status: Full Code   Is the patient medically ready for discharge?: No    Reason for patient still in hospital (select all that apply): Patient trending condition  Discharge Plan A: [facility]            Kaleb Monson MD  Department of Hospital Medicine   [facility] - Med Surg

## 2024-03-10 NOTE — ASSESSMENT & PLAN NOTE
UA with Nitrite+. Unable to discern symptoms d/t poor cooperation during ROS. Febrile to 101.5, although this may be related to COVID+. Previous UCX with largely pan-sensitive Proteus mirabilis.    -- No WBC or bacteria in UA makes acute UTI less certain; however, given Nitrite+ and new fever, will treat as UTI.  - discontinue cefpodoxime

## 2024-03-10 NOTE — PROGRESS NOTES
"CONSULTATION LIAISON PSYCHIATRY PROGRESS NOTE    Patient Name: Cali Mabry  MRN: [id number]  Patient Class: IP- Inpatient  Admission Date: 3/4/2024  Attending Physician: Siddhartha Zavala MD      SUBJECTIVE:   Cali Mabry is a 58 y.o. 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 UDS negative, Carbamazepine level undetectable. PEC placed in ED due to combative behavior and confusion. Patient was found to be COVID+ and have UTI. He was admitted to the hospital for Acute encephalopathy. Patient received Zyprexa 5 mg IM on 3/5 @ 1539.     Psychiatry consulted for "patient with history of Bipolar and substance use disorder, currently PEC'd. Consult for psych evaluation and need for placement in psych facility?"     Interval events: Risperdal decreased to 1mg Bid per psych recommendations. Per nursing notes pt spat out nighttime medications. Became hypoglycemic overnight, was somnolent, only responding to physical stimulation. No behavioral PRNs required. Received AM riseprdal dose.     Today, pt resting in bed. Nursing present at beside testing pt's blood sugar levels, able to move patient around fluidly and no rigidity noted on observation. Pt repeatedly asking about discharge and stating he wants to go home. Pt oriented only to self, unable to answer orientation questions beyond "I don't know."  Denies SI/HI/AVH.        OBJECTIVE:    Mental Status Exam:  General Appearance: dressed in hospital garb, lying in bed, thin and gaunt  Behavior: cooperative, polite, appropriate eye-contact, under good behavioral control  Involuntary Movements and Motor Activity: no abnormal involuntary movements noted  Gait and Station: unable to assess - patient lying down " "or seated  Speech and Language: normal rate, rhythm, volume, tone, and pitch, responds to questions minimally/briefly  Mood: "I want to go home"  Affect: dysphoric, anxious  Thought Process and Associations: perseverative, ruminative  Thought Content and Perceptions::  denies SI/HI/AVH  Sensorium and Orientation: oriented to person only  Recent and Remote Memory: Unwilling to Participate in Formal Testing  Attention and Concentration: easily distractible  Fund of Knowledge: Unable to Formally Assess  Insight: limited/partial awareness of illness  Judgment: behavior is adequate/appropriate to circumstances, improving - refused PM meds but accepted AM meds    CAM ICU positive? Did not assess      ASSESSMENT & RECOMMENDATIONS   Acute Encephalopathy              R/O NPH  Intermittent Explosive Disorder  Hx TBI  Hx CVA  Hx Unspecified Neurocognitive Disorder  Hx Bipolar Disorder  Hx MDD        Intermittent Explosive Disorder  Hx Bipolar Disorder  Hx MDD  PSYCH MEDICATIONS  Scheduled -  Continue Risperdal 1 mg PO BID  Continue Tegretol 400 mg daily  Continue Effexor XR 75 mg PO daily  PRN  Continue Zyprexa 5 mg PO or IM for non-redirectable agitation associated with breakthrough psychosis or vero     DELIRIUM  Recommended Neurology Consult for NPH R/O   Given acute mental status change, gait instability per collateral ~2-3 months, possible urinary incontinence and CT Head results from 3/4 with ventriculomegaly out of proportion to the degree of sulcal enlargement  Per Neuro, no interventions or investigations to be initiated in the inpatient setting.  DELIRIUM BEHAVIOR MANAGEMENT  PLEASE utilize CHEMICAL restraints with PRN meds first for agitation. Minimize use of PHYSICAL restraints OR have periods of being out of physical restraints if possible.  Keep window shades open and room lit during day and room dim at night in order to promote normal sleep-wake cycles  Encourage family at bedside. [place] patient often to " situation, location, date.  Continue to Limit or Discontinue use of Narcotics, Benzos and Anti-cholinergic medications as they may worsen delirium.  Continue medical workup for causative etiology of Delirium.      RISK ASSESSMENT  NEEDS CEC because patient is in imminent danger of hurting self or others and is gravely disabled. & NEEDS 1:1 sitter     FOLLOW UP  Will follow up while in house     DISPOSITION - once medically cleared:   Will reassess daily    Please contact ON CALL psychiatry service (24/7) for any acute issues that may arise.    Dr. Kvng Amezcua MD  \[facility]\""-[facility] PGY-II    Psychiatry  [facility]  3/10/2024 1:05 PM        --------------------------------------------------------------------------------------------------------------------------------------------------------------------------------------------------------------------------------------    CONTINUED OBJECTIVE clinical data & findings reviewed and noted for above decision making    Current Medications:   Scheduled Meds:    aspirin  81 mg Oral Daily    atorvastatin  20 mg Oral Daily    carBAMazepine  400 mg Oral Daily    cefpodoxime  200 mg Oral Q12H    enoxparin  40 mg Subcutaneous Daily    folic acid  1 mg Oral Daily    insulin aspart U-100  5 Units Subcutaneous TIDWM    insulin detemir U-100 (Levemir)  13 Units Subcutaneous BID    losartan  50 mg Oral Daily    melatonin  6 mg Oral Nightly    multivitamin  1 tablet Oral Daily    NIFEdipine  60 mg Oral Daily    polyethylene glycol  17 g Oral Q6H WAKE    risperiDONE  1 mg Oral BID    senna-docusate 8.6-50 mg  1 tablet Oral BID    thiamine  100 mg Oral Daily    venlafaxine  75 mg Oral Daily     PRN Meds: albuterol, benzonatate, dextromethorphan-guaiFENesin  mg/5 ml, dextrose 10%, dextrose 10%, glucagon (human recombinant), glucose, glucose, insulin aspart U-100, labetalol, naloxone, ondansetron, sodium chloride 0.9%    Allergies:   Review of patient's  allergies indicates:  No Known Allergies    Vitals  Vitals:    03/10/24 1104   BP: 121/73   Pulse: 90   Resp: 20   Temp: 98.2 °F (36.8 °C)       Labs/Imaging/Studies:  Recent Results (from the past 24 hour(s))   POCT glucose    Collection Time: 03/09/24  4:27 PM   Result Value Ref Range    POCT Glucose 144 (H) 70 - 110 mg/dL   POCT glucose    Collection Time: 03/09/24  8:02 PM   Result Value Ref Range    POCT Glucose 55 (L) 70 - 110 mg/dL   POCT glucose    Collection Time: 03/09/24 10:14 PM   Result Value Ref Range    POCT Glucose 130 (H) 70 - 110 mg/dL   POCT glucose    Collection Time: 03/10/24  7:16 AM   Result Value Ref Range    POCT Glucose 91 70 - 110 mg/dL   POCT glucose    Collection Time: 03/10/24 11:48 AM   Result Value Ref Range    POCT Glucose 98 70 - 110 mg/dL     Imaging Results              CT Head Without Contrast (Final result)  Result time 03/04/24 16:55:45      Final result by Jered Cheatham MD (03/04/24 16:55:45)                   Impression:      No evidence of acute hemorrhage or major vascular distribution infarct.    Chronic small vessel ischemic change with remote right occipital and left cerebellar infarcts.    Ventriculomegaly out of proportion to the degree of sulcal enlargement, similar to prior exams.  While this could relate to cerebral volume loss, the configuration does raise the possibility of normal pressure hydrocephalus. Clinical correlation required.      Electronically signed by: Jered Cheatham MD  Date:    03/04/2024  Time:    16:55               Narrative:    EXAMINATION:  CT HEAD WITHOUT CONTRAST    CLINICAL HISTORY:  Mental status change, unknown cause;    TECHNIQUE:  Low dose axial CT images obtained throughout the head without the use of intravenous contrast.  Axial, sagittal and coronal reconstructions were performed.    COMPARISON:  12/10/2023.    FINDINGS:  Intracranial compartment:    Ventricles are enlarged, similar to prior.  Third ventricle diameter again  measuring up to 1.4 cm.  Ventricular enlargement out of proportion of degree of sulcal enlargement.    Chronic small vessel ischemic change in the supratentorial white matter.  Remote right occipital infarct.  Remote left cerebellar infarct.  No acute major vascular distribution infarct.  No acute parenchymal hemorrhage.  No new intracranial mass effect or midline shift.    No extra-axial blood or fluid collections.    Skull/extracranial contents (limited evaluation):    No displaced calvarial fracture.    Mastoid air cells are clear. Mild patchy mucosal thickening in the visualized paranasal sinuses.                                       CT Abdomen Pelvis With IV Contrast NO Oral Contrast (Final result)  Result time 03/04/24 18:05:52      Final result by New Falcon MD (03/04/24 18:05:52)                   Impression:      Moderate colonic stool burden with mild wall thickening of the rectum, correlate with signs of constipation or proctitis.    Diffuse bladder wall thickening, correlate with urinalysis and symptoms of cystitis.    Diverticulosis coli without evidence of acute diverticulitis    Stable 5 mm right middle lobe solid pulmonary micronodule.    Additional findings as above.    Electronically signed by resident: Marsha Cardona  Date:    03/04/2024  Time:    17:27    Electronically signed by: New Falcon MD  Date:    03/04/2024  Time:    18:05               Narrative:    EXAMINATION:  CT ABDOMEN PELVIS WITH IV CONTRAST    CLINICAL HISTORY:  Abdominal abscess/infection suspected;    TECHNIQUE:  Axial images of the abdomen and pelvis were acquired after administration of 75 cc Omnipaque 350 IV contrast. No oral contrast was administered.  Coronal and sagittal reconstructions were also obtained.    COMPARISON:  CT 12/12/2023 and 07/26/2023.    FINDINGS:  Examination degraded by patient motion artifact.    Heart: Normal heart size.  No pericardial fluid.  No significant calcific coronary  atherosclerosis.    Lungs: Evaluation of the lung bases limited due to respiratory motion.  Allowing for this, stable linear bandlike opacities of the right lung base suggestive of atelectasis/scarring.  Stable 5 mm right middle lobe solid pulmonary micronodule (2-7).  No new focal consolidation.  No pleural effusion or pneumothorax.    Liver: Normal in size and contour.  No focal hepatic lesion.    Gallbladder: No calcified gallstones.    Bile Ducts: No biliary ductal dilatation.    Pancreas: No mass or peripancreatic fat stranding.    Spleen: Spleen is upper limits of normal in size measuring 12 cm.    Adrenals: No significant abnormalities.    Kidneys/Ureters: Normal in size and location. Normal enhancement. No hydronephrosis or nephrolithiasis. No ureteral dilatation.    Bladder: Diffuse bladder wall thickening.    Reproductive organs: Prostate is unremarkable.    Peritoneum: No free air or free fluid.    Retroperitoneum: No pathologically enlarged retroperitoneal lymph nodes.    Bowel/Mesentery: Small hiatal hernia.  Stomach is otherwise unremarkable.  Small bowel is normal in caliber without evidence of inflammation or obstruction.  Moderate colonic stool burden.  Mild wall thickening of the rectum with large fecal burden, component of proctitis to be considered.  Scattered colonic diverticuli.  Appendix not definitively visualized.  No steve-cecal soft tissue stranding.    Abdominal wall:  Small fat containing umbilical hernia. 4.7 x 1.8 cm fat containing lesion along the left anterolateral chest wall, favored to represent a lipoma.    Vasculature: Mild calcific atherosclerosis of the abdominal aorta and its branches.  No aneurysm.    Bones: Mild degenerative changes spine.  Prominent Schmorl's node at L1, unchanged.  Healing left anterolateral rib fractures.  No acute fractures.  Lucent lesion within the right femoral neck, stable dating back to multiple priors.                                       X-Ray  Chest AP Portable (Final result)  Result time 03/04/24 13:37:35      Final result by Cullen Bush MD (03/04/24 13:37:35)                   Impression:      See above      Electronically signed by: Cullen Bush MD  Date:    03/04/2024  Time:    13:37               Narrative:    EXAMINATION:  XR CHEST AP PORTABLE    CLINICAL HISTORY:  Sepsis;    TECHNIQUE:  Single frontal view of the chest was performed.    COMPARISON:  None 12/22/2023    FINDINGS:  Heart size normal.  The lungs are clear.  No pleural effusion

## 2024-03-10 NOTE — NURSING
Pt remains sleepy responds to physical stimuli. Night meds refused, he spit them out. On call md made aware.

## 2024-03-10 NOTE — ASSESSMENT & PLAN NOTE
Chronic, controlled. Latest blood pressure and vitals reviewed-     Temp:  [97.2 °F (36.2 °C)-98.2 °F (36.8 °C)]   Pulse:  [77-90]   Resp:  [12-20]   BP: (121-159)/(73-91)   SpO2:  [94 %-97 %] .   Home meds for hypertension were reviewed and noted below.   Hypertension Medications               losartan (COZAAR) 50 MG tablet Take 1 tablet (50 mg total) by mouth once daily.    metoprolol succinate (TOPROL-XL) 25 MG 24 hr tablet Take 1 tablet (25 mg total) by mouth once daily.          - on losartan 50 as IP   - PRN labetalol

## 2024-03-10 NOTE — NURSING
Pt has a blood sugar of 55. Tried to give him something to eat but kept falling asleep. Will give give D10W 125 mls as ordered and hold levimer for now. Hospital med on call notified. Stated to recheck blood sugar in 2 hours and can give levimer if blood sugar is up, will renotify her and let her know.

## 2024-03-10 NOTE — PLAN OF CARE
Problem: Adult Inpatient Plan of Care  Goal: Plan of Care Review  Outcome: Ongoing, Progressing  Flowsheets (Taken 3/10/2024 0532)  Plan of Care Reviewed With: patient     Problem: Diabetes Comorbidity  Goal: Blood Glucose Level Within Targeted Range  Outcome: Ongoing, Progressing  Intervention: Monitor and Manage Glycemia  Flowsheets (Taken 3/10/2024 0532)  Glycemic Management:   blood glucose monitored   supplemental insulin given

## 2024-03-10 NOTE — SUBJECTIVE & OBJECTIVE
Interval History: mentation improving. Patient engages in conversation with primary team. No flight of ideas. Slow though process. Decreased risperidone dose to improve tone. Reaching out family for collaterals.      Review of Systems   Unable to perform ROS: Psychiatric disorder     Objective:     Vital Signs (Most Recent):  Temp: 98.2 °F (36.8 °C) (03/10/24 1104)  Pulse: 90 (03/10/24 1104)  Resp: 20 (03/10/24 1104)  BP: 121/73 (03/10/24 1104)  SpO2: (!) 94 % (03/10/24 1104) Vital Signs (24h Range):  Temp:  [97.2 °F (36.2 °C)-98.2 °F (36.8 °C)] 98.2 °F (36.8 °C)  Pulse:  [77-90] 90  Resp:  [12-20] 20  SpO2:  [94 %-97 %] 94 %  BP: (121-159)/(73-91) 121/73     Weight: 63.5 kg (140 lb)  Body mass index is 18.99 kg/m².    Intake/Output Summary (Last 24 hours) at 3/10/2024 1342  Last data filed at 3/10/2024 0531  Gross per 24 hour   Intake 125 ml   Output 300 ml   Net -175 ml         Physical Exam  Constitutional:       Comments: Disheveled    HENT:      Head: Normocephalic.   Eyes:      General: No scleral icterus.     Extraocular Movements: Extraocular movements intact.   Cardiovascular:      Rate and Rhythm: Normal rate and regular rhythm.   Pulmonary:      Effort: No respiratory distress.      Breath sounds: Normal breath sounds. No wheezing or rales.   Abdominal:      General: There is no distension.      Palpations: Abdomen is soft.   Musculoskeletal:      Cervical back: Normal range of motion.   Skin:     General: Skin is warm and dry.      Findings: No rash.   Neurological:      Mental Status: He is alert.   Psychiatric:         Mood and Affect: Affect is labile.         Speech: Speech is delayed.         Behavior: Behavior is slowed.         Cognition and Memory: Cognition is impaired.      Comments: Oriented to person and place. Uncooperative with questioning and physical exam.             Significant Labs: All pertinent labs within the past 24 hours have been reviewed.    Significant Imaging: I have reviewed  all pertinent imaging results/findings within the past 24 hours.

## 2024-03-10 NOTE — PLAN OF CARE
Patient is AAOx1. Vital signs stable. No falls throughout shift. Sitter at bedside. Blood glucose monitored and insulin administered. Patient took medications crushed in apple sauce.  Problem: Infection  Goal: Absence of Infection Signs and Symptoms  Outcome: Ongoing, Progressing     Problem: Adult Inpatient Plan of Care  Goal: Plan of Care Review  Outcome: Ongoing, Progressing  Goal: Patient-Specific Goal (Individualized)  Outcome: Ongoing, Progressing  Goal: Absence of Hospital-Acquired Illness or Injury  Outcome: Ongoing, Progressing  Goal: Optimal Comfort and Wellbeing  Outcome: Ongoing, Progressing  Goal: Readiness for Transition of Care  Outcome: Ongoing, Progressing     Problem: Violence Risk or Actual  Goal: Anger and Impulse Control  Outcome: Ongoing, Progressing     Problem: Diabetes Comorbidity  Goal: Blood Glucose Level Within Targeted Range  Outcome: Ongoing, Progressing     Problem: Impaired Wound Healing  Goal: Optimal Wound Healing  Outcome: Ongoing, Progressing     Problem: Skin Injury Risk Increased  Goal: Skin Health and Integrity  Outcome: Ongoing, Progressing     Problem: Adjustment to Illness (Sepsis/Septic Shock)  Goal: Optimal Coping  Outcome: Ongoing, Progressing     Problem: Bleeding (Sepsis/Septic Shock)  Goal: Absence of Bleeding  Outcome: Ongoing, Progressing     Problem: Glycemic Control Impaired (Sepsis/Septic Shock)  Goal: Blood Glucose Level Within Desired Range  Outcome: Ongoing, Progressing     Problem: Infection Progression (Sepsis/Septic Shock)  Goal: Absence of Infection Signs and Symptoms  Outcome: Ongoing, Progressing     Problem: Nutrition Impaired (Sepsis/Septic Shock)  Goal: Optimal Nutrition Intake  Outcome: Ongoing, Progressing     Problem: Fall Injury Risk  Goal: Absence of Fall and Fall-Related Injury  Outcome: Ongoing, Progressing     Problem: Restraint, Nonbehavioral (Nonviolent)  Goal: Absence of Harm or Injury  Outcome: Ongoing, Progressing

## 2024-03-11 PROBLEM — E11.10 DIABETIC KETOACIDOSIS WITHOUT COMA: Status: RESOLVED | Noted: 2023-12-10 | Resolved: 2024-03-11

## 2024-03-11 LAB
ANION GAP SERPL CALC-SCNC: 10 MMOL/L (ref 8–16)
BASOPHILS # BLD AUTO: 0.03 K/UL (ref 0–0.2)
BASOPHILS NFR BLD: 0.6 % (ref 0–1.9)
BUN SERPL-MCNC: 25 MG/DL (ref 6–20)
CALCIUM SERPL-MCNC: 9 MG/DL (ref 8.7–10.5)
CHLORIDE SERPL-SCNC: 105 MMOL/L (ref 95–110)
CO2 SERPL-SCNC: 22 MMOL/L (ref 23–29)
CREAT SERPL-MCNC: 1 MG/DL (ref 0.5–1.4)
DIFFERENTIAL METHOD BLD: ABNORMAL
EOSINOPHIL # BLD AUTO: 0.1 K/UL (ref 0–0.5)
EOSINOPHIL NFR BLD: 2.6 % (ref 0–8)
ERYTHROCYTE [DISTWIDTH] IN BLOOD BY AUTOMATED COUNT: 14.3 % (ref 11.5–14.5)
EST. GFR  (NO RACE VARIABLE): >60 ML/MIN/1.73 M^2
GLUCOSE SERPL-MCNC: 51 MG/DL (ref 70–110)
HCT VFR BLD AUTO: 38.2 % (ref 40–54)
HGB BLD-MCNC: 12 G/DL (ref 14–18)
IMM GRANULOCYTES # BLD AUTO: 0.02 K/UL (ref 0–0.04)
IMM GRANULOCYTES NFR BLD AUTO: 0.4 % (ref 0–0.5)
LYMPHOCYTES # BLD AUTO: 1.1 K/UL (ref 1–4.8)
LYMPHOCYTES NFR BLD: 23.1 % (ref 18–48)
MCH RBC QN AUTO: 25.3 PG (ref 27–31)
MCHC RBC AUTO-ENTMCNC: 31.4 G/DL (ref 32–36)
MCV RBC AUTO: 80 FL (ref 82–98)
MONOCYTES # BLD AUTO: 0.5 K/UL (ref 0.3–1)
MONOCYTES NFR BLD: 9.7 % (ref 4–15)
NEUTROPHILS # BLD AUTO: 3 K/UL (ref 1.8–7.7)
NEUTROPHILS NFR BLD: 63.6 % (ref 38–73)
NRBC BLD-RTO: 0 /100 WBC
PLATELET # BLD AUTO: 263 K/UL (ref 150–450)
PMV BLD AUTO: 9.6 FL (ref 9.2–12.9)
POCT GLUCOSE: 127 MG/DL (ref 70–110)
POCT GLUCOSE: 171 MG/DL (ref 70–110)
POCT GLUCOSE: 178 MG/DL (ref 70–110)
POCT GLUCOSE: 210 MG/DL (ref 70–110)
POCT GLUCOSE: 251 MG/DL (ref 70–110)
POCT GLUCOSE: 55 MG/DL (ref 70–110)
POCT GLUCOSE: 56 MG/DL (ref 70–110)
POCT GLUCOSE: 84 MG/DL (ref 70–110)
POTASSIUM SERPL-SCNC: 3.7 MMOL/L (ref 3.5–5.1)
RBC # BLD AUTO: 4.75 M/UL (ref 4.6–6.2)
SODIUM SERPL-SCNC: 137 MMOL/L (ref 136–145)
WBC # BLD AUTO: 4.64 K/UL (ref 3.9–12.7)

## 2024-03-11 PROCEDURE — 25000003 PHARM REV CODE 250

## 2024-03-11 PROCEDURE — 36415 COLL VENOUS BLD VENIPUNCTURE: CPT | Performed by: STUDENT IN AN ORGANIZED HEALTH CARE EDUCATION/TRAINING PROGRAM

## 2024-03-11 PROCEDURE — 25000003 PHARM REV CODE 250: Performed by: HOSPITALIST

## 2024-03-11 PROCEDURE — 92610 EVALUATE SWALLOWING FUNCTION: CPT

## 2024-03-11 PROCEDURE — 80048 BASIC METABOLIC PNL TOTAL CA: CPT | Performed by: STUDENT IN AN ORGANIZED HEALTH CARE EDUCATION/TRAINING PROGRAM

## 2024-03-11 PROCEDURE — 97535 SELF CARE MNGMENT TRAINING: CPT

## 2024-03-11 PROCEDURE — 63600175 PHARM REV CODE 636 W HCPCS

## 2024-03-11 PROCEDURE — 85025 COMPLETE CBC W/AUTO DIFF WBC: CPT | Performed by: STUDENT IN AN ORGANIZED HEALTH CARE EDUCATION/TRAINING PROGRAM

## 2024-03-11 PROCEDURE — 27000207 HC ISOLATION

## 2024-03-11 PROCEDURE — 25000003 PHARM REV CODE 250: Performed by: STUDENT IN AN ORGANIZED HEALTH CARE EDUCATION/TRAINING PROGRAM

## 2024-03-11 PROCEDURE — 21400001 HC TELEMETRY ROOM

## 2024-03-11 PROCEDURE — 94761 N-INVAS EAR/PLS OXIMETRY MLT: CPT

## 2024-03-11 RX ORDER — INSULIN ASPART 100 [IU]/ML
3 INJECTION, SOLUTION INTRAVENOUS; SUBCUTANEOUS
Status: DISCONTINUED | OUTPATIENT
Start: 2024-03-11 | End: 2024-03-11

## 2024-03-11 RX ORDER — INSULIN ASPART 100 [IU]/ML
5 INJECTION, SOLUTION INTRAVENOUS; SUBCUTANEOUS
Status: DISCONTINUED | OUTPATIENT
Start: 2024-03-11 | End: 2024-03-16

## 2024-03-11 RX ORDER — OLANZAPINE 5 MG/1
5 TABLET, ORALLY DISINTEGRATING ORAL NIGHTLY
Status: DISCONTINUED | OUTPATIENT
Start: 2024-03-11 | End: 2024-03-12

## 2024-03-11 RX ADMIN — Medication 6 MG: at 09:03

## 2024-03-11 RX ADMIN — ENOXAPARIN SODIUM 40 MG: 40 INJECTION SUBCUTANEOUS at 05:03

## 2024-03-11 RX ADMIN — DEXTROSE MONOHYDRATE 125 ML: 100 INJECTION, SOLUTION INTRAVENOUS at 07:03

## 2024-03-11 RX ADMIN — NIFEDIPINE 60 MG: 60 TABLET, FILM COATED, EXTENDED RELEASE ORAL at 09:03

## 2024-03-11 RX ADMIN — INSULIN ASPART 3 UNITS: 100 INJECTION, SOLUTION INTRAVENOUS; SUBCUTANEOUS at 01:03

## 2024-03-11 RX ADMIN — Medication 100 MG: at 09:03

## 2024-03-11 RX ADMIN — POLYETHYLENE GLYCOL 3350 17 G: 17 POWDER, FOR SOLUTION ORAL at 01:03

## 2024-03-11 RX ADMIN — SENNOSIDES AND DOCUSATE SODIUM 1 TABLET: 8.6; 5 TABLET ORAL at 09:03

## 2024-03-11 RX ADMIN — POLYETHYLENE GLYCOL 3350 17 G: 17 POWDER, FOR SOLUTION ORAL at 09:03

## 2024-03-11 RX ADMIN — ASPIRIN 81 MG CHEWABLE TABLET 81 MG: 81 TABLET CHEWABLE at 09:03

## 2024-03-11 RX ADMIN — INSULIN ASPART 5 UNITS: 100 INJECTION, SOLUTION INTRAVENOUS; SUBCUTANEOUS at 05:03

## 2024-03-11 RX ADMIN — RISPERIDONE 1 MG: 1 TABLET, ORALLY DISINTEGRATING ORAL at 09:03

## 2024-03-11 RX ADMIN — INSULIN ASPART 5 UNITS: 100 INJECTION, SOLUTION INTRAVENOUS; SUBCUTANEOUS at 01:03

## 2024-03-11 RX ADMIN — ATORVASTATIN CALCIUM 20 MG: 20 TABLET, FILM COATED ORAL at 09:03

## 2024-03-11 RX ADMIN — LOSARTAN POTASSIUM 50 MG: 50 TABLET, FILM COATED ORAL at 09:03

## 2024-03-11 RX ADMIN — FOLIC ACID 1 MG: 1 TABLET ORAL at 09:03

## 2024-03-11 RX ADMIN — VENLAFAXINE HYDROCHLORIDE 75 MG: 75 CAPSULE, EXTENDED RELEASE ORAL at 09:03

## 2024-03-11 RX ADMIN — THERA TABS 1 TABLET: TAB at 09:03

## 2024-03-11 RX ADMIN — CARBAMAZEPINE 400 MG: 200 TABLET ORAL at 09:03

## 2024-03-11 RX ADMIN — OLANZAPINE 5 MG: 5 TABLET, ORALLY DISINTEGRATING ORAL at 09:03

## 2024-03-11 NOTE — SUBJECTIVE & OBJECTIVE
Interval History: Patient appears a little more alert and oriented. He tells me that he lives with his mom at home. He knows that he is in the hospital. Patient with some low sugars and given some D5. Discussed with nursing staff to continue his Levemir, but hold his short acting aspart if he is not eating a lot.    Review of Systems   Unable to perform ROS: Psychiatric disorder     Objective:     Vital Signs (Most Recent):  Temp: 97.2 °F (36.2 °C) (03/11/24 0740)  Pulse: 93 (03/11/24 0740)  Resp: 18 (03/11/24 0740)  BP: 129/74 (03/11/24 0740)  SpO2: (!) 91 % (03/11/24 0740) Vital Signs (24h Range):  Temp:  [97.2 °F (36.2 °C)-98.2 °F (36.8 °C)] 97.2 °F (36.2 °C)  Pulse:  [74-93] 93  Resp:  [16-20] 18  SpO2:  [91 %-96 %] 91 %  BP: (114-130)/(54-74) 129/74     Weight: 63.5 kg (140 lb)  Body mass index is 18.99 kg/m².    Intake/Output Summary (Last 24 hours) at 3/11/2024 0916  Last data filed at 3/11/2024 0452  Gross per 24 hour   Intake 180 ml   Output 400 ml   Net -220 ml         Physical Exam  Constitutional:       Comments: Disheveled    HENT:      Head: Normocephalic.   Eyes:      General: No scleral icterus.     Extraocular Movements: Extraocular movements intact.   Cardiovascular:      Rate and Rhythm: Normal rate and regular rhythm.   Pulmonary:      Effort: No respiratory distress.      Breath sounds: Normal breath sounds. No wheezing or rales.   Abdominal:      General: There is no distension.      Palpations: Abdomen is soft.   Musculoskeletal:      Cervical back: Normal range of motion.   Skin:     General: Skin is warm and dry.      Findings: No rash.   Neurological:      Mental Status: He is alert.   Psychiatric:         Mood and Affect: Affect is labile.         Speech: Speech is delayed.         Behavior: Behavior is slowed.         Cognition and Memory: Cognition is impaired.      Comments: Oriented to person and place.             Significant Labs: All pertinent labs within the past 24 hours have been  reviewed.  CBC:   Recent Labs   Lab 03/11/24  0504   WBC 4.64   HGB 12.0*   HCT 38.2*        CMP:   Recent Labs   Lab 03/11/24  0504      K 3.7      CO2 22*   GLU 51*   BUN 25*   CREATININE 1.0   CALCIUM 9.0   ANIONGAP 10       Significant Imaging: I have reviewed all pertinent imaging results/findings within the past 24 hours.

## 2024-03-11 NOTE — PROGRESS NOTES
"CONSULTATION LIAISON PSYCHIATRY PROGRESS NOTE    Patient Name: Cali Mabry  MRN: [id number]  Patient Class: IP- Inpatient  Admission Date: 3/4/2024  Attending Physician: Siddhartha Zavala MD      SUBJECTIVE:   Cali Mabry is a 58 y.o. 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 UDS negative, Carbamazepine level undetectable. PEC placed in ED due to combative behavior and confusion. Patient was found to be COVID+ and have UTI. He was admitted to the hospital for Acute encephalopathy. Patient received Zyprexa 5 mg IM on 3/5 @ 1539.     Psychiatry consulted for "patient with history of Bipolar and substance use disorder, currently PEC'd. Consult for psych evaluation and need for placement in psych facility?"     No behavioral PRNs required. Received AM riseprdal dose.     Today, patient seen at beside. He is sitting up in bed awake but head is turned to the left and neck fixed in a flexed position this way. Patient reports he is unable to straighted his head when asked to do so but does not respond to questions as to why he cannot move his head or neck. Attempted to straighten the patients head and was able to do so but the patient reported pain. The patient does not appear to be be blinking during interview. He reports is current at the Doctor's office in [facility] and the year is 2002. He becomes perseverative when asked further orientation questions. On physical exam, the patient reports pain in his L hand, RUE, and RLE. There is resistance to passive movement and he maintains position of his hands and fingers when placed by the interviewer and despite being told he can relax. Grasp reflex is present in patient's L hand.    OBJECTIVE:    Mental " Status Exam:  General Appearance: dressed in hospital garb, lying in bed, thin and gaunt  Behavior: cooperative, polite, appropriate eye-contact, under good behavioral control  Involuntary Movements and Motor Activity: no abnormal involuntary movements noted  Gait and Station: unable to assess - patient lying down or seated  Speech and Language: normal rate, rhythm, volume, tone, and pitch, responds to questions minimally/briefly  Mood: unable to assess  Affect: dysphoric, anxious  Thought Process and Associations: perseverative  Thought Content and Perceptions::  denies SI/HI/AVH  Sensorium and Orientation: oriented to person only  Recent and Remote Memory: Unable to  Formally Assess  Attention and Concentration: Unable to Formally Assess  Fund of Knowledge: Unable to Formally Assess  Insight: limited/partial awareness of illness  Judgment: behavior is adequate/appropriate to circumstances, improving     CAM ICU positive? Did not assess      ALARCON-MEY CATATONIA RATING SCALE  Use presence or absence of items 1-14 for screening- The presence of two or more of the screening items for 24 hours or longer meets the diagnosis for catatonia  Use the 0-3 scale for items 1-23 to rate severity    1. Excitement:  Extreme hyperactivity, constant motor unrest which is apparently nonpurposeful.  Not to be attributed to akathisia or goal directed agitation  0 = Absent  1 = Excessive motion  2 = Constant motion, hyperkinetic without rest periods  3 = Full-blown catatonic excitement, endless frenzied motor   activity    2. Immobility/stupor:  Extreme hypoactivity, immobile, minimally responsive to stimuli  0 = Absent  1 = Sits abnormally still, may interact briefly  2 = Virtually no interaction with external world  3 = Stuporous, non-reactive to painful stimuli    3. Mutism:  Verbally unresponsive or minimally responsive  0 = Absent  1 = Verbally unresponsive to majority of questions; incomprehensible   whisper  2 = Speaks less  than 20 words/ 5 min  3 = No speech    4. Staring:  Fixed gaze, little or no visual scanning of environment, decreased blinking.  0 = Absent  1 = Poor eye contact, repeatedly gazes less than 20 seconds between   shifting of attention; decreased blinking  2 = Gaze held longer than 20 seconds, occasionally shifts attention  3 = Fixed gaze, non-reactive    5. Posturing/catalepsy:  Spontaneous maintenance of posture(s), including mundane (e.g. setting  or standing for long periods without reacting).  0 = Absent  1 = Less than 1 minute  2 = Greater than one minute, less than 15 minutes  3 = Bizarre posture, or mundane maintained more than 15 minutes    6. Grimacing:  Maintenance of odd facial expressions.  0 = Absent  1 = Less than 10 seconds  2 = Less than 1 minute  3 = Bizarre expression(s) or maintained more than 1 minute    7. Echopraxia/echolalia:  Mimicking of examiners movements/speech.  0 = Mimicking of examiners movements/speech  1 = Occasional  2 = Frequent  3 = Constant    8. Stereotypy:  Repetitive, non-goal-directed motor activity (e.g. finger-play; repeatedly  touching, patting or rubbing self); abnormality not inherent in act but in  frequency.  0 = Absent  1 = Occasional  2 = Frequent  3 = Constant    9. Mannerisms:  Odd, purposeful movements (hopping or walking tiptoe, saluting passersby  or exaggerated caricatures of mundane movements); abnormality  inherent in act itself.  0 = Absent  1 = Occasional  2 = Frequent  3 = Constant    10. Verbigeration:  Repetition of phrases or sentences (like a scratched record).  0 = Absent  1 = Occasional  2 = Frequent  3 = Constant    11. Rigidity:  Maintenance of a rigid position despite efforts to be moved, exclude if cogwheeling  or tremor present.  0 = Absent  1 = Mild resistance  2 = Moderate  3 = Severe, cannot be repostured    12. Negativism:  Apparently motiveless resistance to instructions or attempts to  move/examine patient. Contrary behavior, does exact  opposite of  instruction  0 = Absent  1 = Mild resistance and/or occasionally contrary  2 = Moderate resistance and/or frequently contrary  3 = Severe resistance and/or continually contrary    13. Waxy Flexibility:  During reposturing of patient, patient offers initial resistance before  allowing himself to be repositioned, similar to that of a bending candle.  0 = Absent  3 = Present    14. Withdrawal:  Refusal to eat, drink and/or make eye contact.  0 = Absent  1 = Minimal PO intake/interaction for less than 1 day  2 = Minimal PO intake/interaction for more than 1 day  3 = No PO intake/interaction for 1 day or more.     15. Impulsivity:  Patient suddenly engages in inappropriate behavior (e.g. runs down  hallway, starts screaming or takes off clothes) without provocation.  Afterwards can give no, or only a facile explanation.  0 = Absent  1 = Occasional  2 = Frequent  3 = Constant or not redirectable    16. Automatic obedience:  Exaggerated cooperation with examiners request or spontaneous  continuation of movement requested.  0 = Absent  1 = Occasional  2 = Frequent  3 = Constant    17. [place]:  Anglepoise lamp arm raising in response to light pressure of finger,  despite instruction to the contrary.  0 = Absent  3 = Present    18. [place]:  Resistance to passive movement which is proportional to strength of the  stimulus, appears automatic rather than willful.  0 = Absent  3 = Present    19. Ambitendency:  Patient appears motorically stuck in indecisive, hesitant movement.  0 = Absent  3 = Present    20. Grasp reflex:  Per neurological exam  0 = Absent  3 = Present    21. Perseveration:  Repeatedly returns to same topic or persists with movement.  0 = Absent  3 = Present    22. Combativeness:  Usually in an undirected manner, with no, or only a facile explanation  afterwards.  0 = Absent  1 = Occasionally strikes out, low potential for injury  2 = Frequently strikes out, moderate potential for  injury  3 = Serious danger to others    23. Autonomic abnormality:  [place]: temperature, BP, pulse, respiratory rate, diaphoresis.  0 = Absent  1 = Abnormality of one parameter [excluding pre-existing hypertension]  2 = Abnormality of two parameters  3 = Abnormality of three or more parameters    TOTAL:_23__       AIMS: Score 3/36  Abnormal Involuntary Movement Scale  0-4   Muscles of Facial Expression  0   Lips and Perioral Area  0   Jaw  0   Tongue  0   Upper (arms, wrists, hands, fingers)  0    Lower (legs, knees, ankles, toes)  0   Neck, shoulders, hips  3   Severity of abnormal movements (highest score from questions above)  0    Incapacitation due to abnormal movements  0    Patient's awareness of abnormal movements (rate only patient's report)  0   Current problems with teeth and/or dentures?  No    Does patient usually wear dentures?  No           ASSESSMENT & RECOMMENDATIONS   Acute Encephalopathy              R/O NPH  Intermittent Explosive Disorder  Hx TBI  Hx CVA  Hx Unspecified Neurocognitive Disorder  Hx Bipolar Disorder  Hx MDD        Intermittent Explosive Disorder  Hx Bipolar Disorder  Hx MDD  PSYCH MEDICATIONS  Scheduled -  HOLD Risperdal 1 mg PO BID at this time  Start Zyprexa (Zydis) 5 mg PO nightly   Continue Tegretol 400 mg daily  Continue Effexor XR 75 mg PO daily  PRN  Continue Zyprexa 5 mg PO or IM for non-redirectable agitation associated with breakthrough psychosis or vero     DELIRIUM  Recommended Neurology Consult for NPH R/O   Given acute mental status change, gait instability per collateral ~2-3 months, possible urinary incontinence and CT Head results from 3/4 with ventriculomegaly out of proportion to the degree of sulcal enlargement  Per Neuro, no interventions or investigations to be initiated in the inpatient setting.  DELIRIUM BEHAVIOR MANAGEMENT  PLEASE utilize CHEMICAL restraints with PRN meds first for agitation. Minimize use of PHYSICAL restraints OR have periods of being  out of physical restraints if possible.  Keep window shades open and room lit during day and room dim at night in order to promote normal sleep-wake cycles  Encourage family at bedside. [place] patient often to situation, location, date.  Continue to Limit or Discontinue use of Narcotics, Benzos and Anti-cholinergic medications as they may worsen delirium.  Continue medical workup for causative etiology of Delirium.      RISK ASSESSMENT  NEEDS CEC because patient is in imminent danger of hurting self or others and is gravely disabled. & NEEDS 1:1 sitter     FOLLOW UP  Will follow up while in house     DISPOSITION - once medically cleared:   Will reassess daily    Please contact ON CALL psychiatry service (24/7) for any acute issues that may arise.    Dr. Martha Storey MD  [facility] PGY-II    Psychiatry  [facility]  3/11/2024 1:05 PM        --------------------------------------------------------------------------------------------------------------------------------------------------------------------------------------------------------------------------------------    CONTINUED OBJECTIVE clinical data & findings reviewed and noted for above decision making    Current Medications:   Scheduled Meds:    aspirin  81 mg Oral Daily    atorvastatin  20 mg Oral Daily    carBAMazepine  400 mg Oral Daily    enoxparin  40 mg Subcutaneous Daily    folic acid  1 mg Oral Daily    insulin aspart U-100  5 Units Subcutaneous TIDWM    insulin detemir U-100 (Levemir)  10 Units Subcutaneous BID    losartan  50 mg Oral Daily    melatonin  6 mg Oral Nightly    multivitamin  1 tablet Oral Daily    NIFEdipine  60 mg Oral Daily    polyethylene glycol  17 g Oral Q6H WAKE    risperiDONE  1 mg Oral BID    senna-docusate 8.6-50 mg  1 tablet Oral BID    thiamine  100 mg Oral Daily    venlafaxine  75 mg Oral Daily     PRN Meds: albuterol, benzonatate, dextromethorphan-guaiFENesin  mg/5 ml, dextrose 10%,  dextrose 10%, glucagon (human recombinant), glucose, glucose, insulin aspart U-100, labetalol, naloxone, ondansetron, sodium chloride 0.9%    Allergies:   Review of patient's allergies indicates:  No Known Allergies    Vitals  Vitals:    03/11/24 0921   BP: 129/74   Pulse:    Resp:    Temp:        Labs/Imaging/Studies:  Recent Results (from the past 24 hour(s))   POCT glucose    Collection Time: 03/10/24 11:48 AM   Result Value Ref Range    POCT Glucose 98 70 - 110 mg/dL   POCT glucose    Collection Time: 03/10/24  4:27 PM   Result Value Ref Range    POCT Glucose 240 (H) 70 - 110 mg/dL   POCT glucose    Collection Time: 03/10/24  9:10 PM   Result Value Ref Range    POCT Glucose 121 (H) 70 - 110 mg/dL   CBC auto differential    Collection Time: 03/11/24  5:04 AM   Result Value Ref Range    WBC 4.64 3.90 - 12.70 K/uL    RBC 4.75 4.60 - 6.20 M/uL    Hemoglobin 12.0 (L) 14.0 - 18.0 g/dL    Hematocrit 38.2 (L) 40.0 - 54.0 %    MCV 80 (L) 82 - 98 fL    MCH 25.3 (L) 27.0 - 31.0 pg    MCHC 31.4 (L) 32.0 - 36.0 g/dL    RDW 14.3 11.5 - 14.5 %    Platelets 263 150 - 450 K/uL    MPV 9.6 9.2 - 12.9 fL    Immature Granulocytes 0.4 0.0 - 0.5 %    Gran # (ANC) 3.0 1.8 - 7.7 K/uL    Immature Grans (Abs) 0.02 0.00 - 0.04 K/uL    Lymph # 1.1 1.0 - 4.8 K/uL    Mono # 0.5 0.3 - 1.0 K/uL    Eos # 0.1 0.0 - 0.5 K/uL    Baso # 0.03 0.00 - 0.20 K/uL    nRBC 0 0 /100 WBC    Gran % 63.6 38.0 - 73.0 %    Lymph % 23.1 18.0 - 48.0 %    Mono % 9.7 4.0 - 15.0 %    Eosinophil % 2.6 0.0 - 8.0 %    Basophil % 0.6 0.0 - 1.9 %    Differential Method Automated    Basic Metabolic Panel    Collection Time: 03/11/24  5:04 AM   Result Value Ref Range    Sodium 137 136 - 145 mmol/L    Potassium 3.7 3.5 - 5.1 mmol/L    Chloride 105 95 - 110 mmol/L    CO2 22 (L) 23 - 29 mmol/L    Glucose 51 (L) 70 - 110 mg/dL    BUN 25 (H) 6 - 20 mg/dL    Creatinine 1.0 0.5 - 1.4 mg/dL    Calcium 9.0 8.7 - 10.5 mg/dL    Anion Gap 10 8 - 16 mmol/L    eGFR >60.0 >60 mL/min/1.73  m^2   POCT glucose    Collection Time: 03/11/24  7:43 AM   Result Value Ref Range    POCT Glucose 56 (L) 70 - 110 mg/dL   POCT glucose    Collection Time: 03/11/24  7:44 AM   Result Value Ref Range    POCT Glucose 55 (L) 70 - 110 mg/dL   POCT glucose    Collection Time: 03/11/24  8:00 AM   Result Value Ref Range    POCT Glucose 171 (H) 70 - 110 mg/dL   POCT glucose    Collection Time: 03/11/24  8:02 AM   Result Value Ref Range    POCT Glucose 178 (H) 70 - 110 mg/dL     Imaging Results              CT Head Without Contrast (Final result)  Result time 03/04/24 16:55:45      Final result by Jered Cheatham MD (03/04/24 16:55:45)                   Impression:      No evidence of acute hemorrhage or major vascular distribution infarct.    Chronic small vessel ischemic change with remote right occipital and left cerebellar infarcts.    Ventriculomegaly out of proportion to the degree of sulcal enlargement, similar to prior exams.  While this could relate to cerebral volume loss, the configuration does raise the possibility of normal pressure hydrocephalus. Clinical correlation required.      Electronically signed by: Jered Cheatham MD  Date:    03/04/2024  Time:    16:55               Narrative:    EXAMINATION:  CT HEAD WITHOUT CONTRAST    CLINICAL HISTORY:  Mental status change, unknown cause;    TECHNIQUE:  Low dose axial CT images obtained throughout the head without the use of intravenous contrast.  Axial, sagittal and coronal reconstructions were performed.    COMPARISON:  12/10/2023.    FINDINGS:  Intracranial compartment:    Ventricles are enlarged, similar to prior.  Third ventricle diameter again measuring up to 1.4 cm.  Ventricular enlargement out of proportion of degree of sulcal enlargement.    Chronic small vessel ischemic change in the supratentorial white matter.  Remote right occipital infarct.  Remote left cerebellar infarct.  No acute major vascular distribution infarct.  No acute parenchymal  hemorrhage.  No new intracranial mass effect or midline shift.    No extra-axial blood or fluid collections.    Skull/extracranial contents (limited evaluation):    No displaced calvarial fracture.    Mastoid air cells are clear. Mild patchy mucosal thickening in the visualized paranasal sinuses.                                       CT Abdomen Pelvis With IV Contrast NO Oral Contrast (Final result)  Result time 03/04/24 18:05:52      Final result by New Falcon MD (03/04/24 18:05:52)                   Impression:      Moderate colonic stool burden with mild wall thickening of the rectum, correlate with signs of constipation or proctitis.    Diffuse bladder wall thickening, correlate with urinalysis and symptoms of cystitis.    Diverticulosis coli without evidence of acute diverticulitis    Stable 5 mm right middle lobe solid pulmonary micronodule.    Additional findings as above.    Electronically signed by resident: Marsha Cardona  Date:    03/04/2024  Time:    17:27    Electronically signed by: New Falcon MD  Date:    03/04/2024  Time:    18:05               Narrative:    EXAMINATION:  CT ABDOMEN PELVIS WITH IV CONTRAST    CLINICAL HISTORY:  Abdominal abscess/infection suspected;    TECHNIQUE:  Axial images of the abdomen and pelvis were acquired after administration of 75 cc Omnipaque 350 IV contrast. No oral contrast was administered.  Coronal and sagittal reconstructions were also obtained.    COMPARISON:  CT 12/12/2023 and 07/26/2023.    FINDINGS:  Examination degraded by patient motion artifact.    Heart: Normal heart size.  No pericardial fluid.  No significant calcific coronary atherosclerosis.    Lungs: Evaluation of the lung bases limited due to respiratory motion.  Allowing for this, stable linear bandlike opacities of the right lung base suggestive of atelectasis/scarring.  Stable 5 mm right middle lobe solid pulmonary micronodule (2-7).  No new focal consolidation.  No pleural effusion or  pneumothorax.    Liver: Normal in size and contour.  No focal hepatic lesion.    Gallbladder: No calcified gallstones.    Bile Ducts: No biliary ductal dilatation.    Pancreas: No mass or peripancreatic fat stranding.    Spleen: Spleen is upper limits of normal in size measuring 12 cm.    Adrenals: No significant abnormalities.    Kidneys/Ureters: Normal in size and location. Normal enhancement. No hydronephrosis or nephrolithiasis. No ureteral dilatation.    Bladder: Diffuse bladder wall thickening.    Reproductive organs: Prostate is unremarkable.    Peritoneum: No free air or free fluid.    Retroperitoneum: No pathologically enlarged retroperitoneal lymph nodes.    Bowel/Mesentery: Small hiatal hernia.  Stomach is otherwise unremarkable.  Small bowel is normal in caliber without evidence of inflammation or obstruction.  Moderate colonic stool burden.  Mild wall thickening of the rectum with large fecal burden, component of proctitis to be considered.  Scattered colonic diverticuli.  Appendix not definitively visualized.  No steve-cecal soft tissue stranding.    Abdominal wall:  Small fat containing umbilical hernia. 4.7 x 1.8 cm fat containing lesion along the left anterolateral chest wall, favored to represent a lipoma.    Vasculature: Mild calcific atherosclerosis of the abdominal aorta and its branches.  No aneurysm.    Bones: Mild degenerative changes spine.  Prominent Schmorl's node at L1, unchanged.  Healing left anterolateral rib fractures.  No acute fractures.  Lucent lesion within the right femoral neck, stable dating back to multiple priors.                                       X-Ray Chest AP Portable (Final result)  Result time 03/04/24 13:37:35      Final result by Cullen Bush MD (03/04/24 13:37:35)                   Impression:      See above      Electronically signed by: Cullen Bush MD  Date:    03/04/2024  Time:    13:37               Narrative:    EXAMINATION:  XR CHEST AP  PORTABLE    CLINICAL HISTORY:  Sepsis;    TECHNIQUE:  Single frontal view of the chest was performed.    COMPARISON:  None 12/22/2023    FINDINGS:  Heart size normal.  The lungs are clear.  No pleural effusion

## 2024-03-11 NOTE — ASSESSMENT & PLAN NOTE
Patient's anemia is currently controlled. Has not received any PRBCs to date. Etiology likely d/t chronic disease due to recurrent infections, inflammation  Current CBC reviewed-   Lab Results   Component Value Date    HGB 12.0 (L) 03/11/2024    HCT 38.2 (L) 03/11/2024     Monitor serial CBC and transfuse if patient becomes hemodynamically unstable, symptomatic or H/H drops below 7/21.

## 2024-03-11 NOTE — PLAN OF CARE
[facility] - Med Surg  Discharge Reassessment    Primary Care Provider: Lawanda Flores NP    Expected Discharge Date: 3/12/2024      Patient remains inpatient due to need for continued medical management. Patient being follow by psych and will possibly discharge tomorrow 03/12/2024 to home vs inpatient psych. Discharge Plan A and Plan B have been determined by review of patient's clinical status, future medical and therapeutic needs, and coverage/benefits for post-acute care in coordination with multidisciplinary team members.   Reassessment (most recent)       Discharge Reassessment - 03/11/24 1501          Discharge Reassessment    Assessment Type Discharge Planning Reassessment (P)      Did the patient's condition or plan change since previous assessment? No (P)      Discharge Plan discussed with: Patient (P)      Communicated ROLA with patient/caregiver Yes (P)      Discharge Plan A Psychiatric hospital (P)      Discharge Plan B Home with family (P)      DME Needed Upon Discharge  none (P)      Transition of Care Barriers None (P)      Why the patient remains in the hospital Requires continued medical care (P)         Post-Acute Status    Coverage [facility] Joanie Santos (P)      Discharge Delays None known at this time (P)                      [facility]  Case Management  [phone]

## 2024-03-11 NOTE — ASSESSMENT & PLAN NOTE
Currently not having sepsis criteria. Positive nitrates on admission. On 5 day course antibiotic.     - Finished 7 day course on 3/10/24, stopped cefpodoxime.

## 2024-03-11 NOTE — ASSESSMENT & PLAN NOTE
-- Continue home medication regimen: carbamazepime, venlafaxine, risperidone  -- continue risperidone 1mg in AM and QHS

## 2024-03-11 NOTE — ASSESSMENT & PLAN NOTE
Chronic, controlled. Latest blood pressure and vitals reviewed-     Temp:  [97.2 °F (36.2 °C)-98.2 °F (36.8 °C)]   Pulse:  [74-93]   Resp:  [16-20]   BP: (114-130)/(54-74)   SpO2:  [91 %-96 %] .   Home meds for hypertension were reviewed and noted below.   Hypertension Medications               losartan (COZAAR) 50 MG tablet Take 1 tablet (50 mg total) by mouth once daily.    metoprolol succinate (TOPROL-XL) 25 MG 24 hr tablet Take 1 tablet (25 mg total) by mouth once daily.          - on losartan 50 as IP   - PRN labetalol

## 2024-03-11 NOTE — CONSULTS
"Hospital Medicine Pharmacy Consult Note    PharmD Consult Received For:     Pharmacy to perform an admission medication history and reconciliation  Medication history completed by Annetta Vieira PharmD. Patients home medications have been documented below.     Current Outpatient Medications on File Prior to Encounter   Medication Sig Dispense Refill Last Dose    albuterol (PROVENTIL/VENTOLIN HFA) 90 mcg/actuation inhaler Inhale 1-2 puffs into the lungs every 6 (six) hours as needed for Wheezing. Rescue 18 g 0     aspirin 81 MG Chew Take 1 tablet (81 mg total) by mouth once daily. 90 tablet 3     atorvastatin (LIPITOR) 20 MG tablet Take 1 tablet (20 mg total) by mouth once daily. 90 tablet 3     BD GERTRUDIS 2ND GEN PEN NEEDLE 32 gauge x 5/32" Ndle Inject into the skin 4 (four) times daily.       carBAMazepine (TEGRETOL XR) 200 MG 12 hr tablet Take 1 tablet (200 mg total) by mouth once daily. 30 tablet 3     FREESTYLE SEBASTIAN 2 SENSOR Kit USE FOUR TIMES PER WEEK AS DIRECTED       insulin detemir U-100, Levemir, 100 unit/mL (3 mL) SubQ InPn pen Inject 17 Units into the skin once daily. (Patient taking differently: Inject into the skin once daily. Inject 17 units under the skin in the morning and 10 units in the evening) 5.1 mL 11     insulin lispro 100 unit/mL pen Inject 10 Units into the skin 3 (three) times daily before meals.       levocetirizine (XYZAL) 5 MG tablet Take 5 mg by mouth every evening.       losartan (COZAAR) 50 MG tablet Take 1 tablet (50 mg total) by mouth once daily. 90 tablet 3     folic acid (FOLVITE) 1 MG tablet Take 1 tablet (1 mg total) by mouth once daily. 90 tablet 3     naloxone (NARCAN) 4 mg/actuation Spry 4mg by nasal route as needed for opioid overdose; may repeat every 2-3 minutes in alternating nostrils until medical help arrives. Call 911 1 each 11     thiamine 100 MG tablet Take 1 tablet (100 mg total) by mouth once daily. 90 tablet 0     [DISCONTINUED] cetirizine (ZYRTEC) 10 MG tablet " Take 1 tablet (10 mg total) by mouth once daily. (Patient not taking: Reported on 3/10/2024) 30 tablet 0 Not Taking    [DISCONTINUED] clopidogreL (PLAVIX) 75 mg tablet Take 1 tablet (75 mg total) by mouth once daily. (Patient not taking: Reported on 3/10/2024) 30 tablet 0 Not Taking    [DISCONTINUED] insulin aspart U-100 (NOVOLOG) 100 unit/mL injection Inject 1-10 Units into the skin 3 (three) times daily with meals.       [DISCONTINUED] insulin detemir U-100, Levemir, 100 unit/mL (3 mL) SubQ InPn pen Inject 10 Units into the skin every evening. 3 mL 11     [DISCONTINUED] loratadine (CLARITIN) 10 mg tablet Take 10 mg by mouth.       [DISCONTINUED] melatonin (MELATIN) 3 mg tablet Take 2 tablets (6 mg total) by mouth nightly. 30 tablet 0     [DISCONTINUED] metoprolol succinate (TOPROL-XL) 25 MG 24 hr tablet Take 1 tablet (25 mg total) by mouth once daily. 30 tablet 11     [DISCONTINUED] multivitamin Tab Take 1 tablet by mouth once daily. 90 tablet 0     [DISCONTINUED] ONETOUCH DELICA PLUS LANCET 33 gauge Misc Apply topically 4 (four) times daily.       [DISCONTINUED] ONETOUCH ULTRA TEST Strp USE TO TEST BLOOD SUGAR FOUR TIMES DAILY       [DISCONTINUED] ONETOUCH ULTRA2 METER Misc 4 (four) times daily.       [DISCONTINUED] risperiDONE (RISPERDAL M-TABS) 1 MG TbDL Take 1 tablet (1 mg total) by mouth 2 (two) times daily. 60 tablet 3     [DISCONTINUED] venlafaxine (EFFEXOR-XR) 75 MG 24 hr capsule Take 1 capsule (75 mg total) by mouth once daily. 30 capsule 3        Pharmacy will sign-off, please re-consult as needed    Thank you for the consult,  Annetta Vieira  Extension [phone]    **Note: Consults are reviewed Monday-Friday 7:00am-3:30pm. The above recommendations are only suggested. The recommendations should be considered in conjunction with all patient factors.**

## 2024-03-11 NOTE — PROGRESS NOTES
[facility] Medicine  Progress Note    Patient Name: Cali Mabry  MRN: [id number]  Patient Class: IP- Inpatient   Admission Date: 3/4/2024  Length of Stay: 7 days  Attending Physician: Siddhartha Zavala MD  Primary Care Provider: Lawanda Flores NP        Subjective:     Principal Problem:Acute encephalopathy        HPI:  58 M with DM1, extensive IVDU, HCV Ab+, HTN, previous CVA with R sided deficits, HFpEF, CAD, chronic pancreatitis, mixed mood / psychiatric disorder presenting to [facility] ED with chief complaint of weakness of unspecified duration and onset. Admits to using shooting methamphetamines. Rest of history from patient is non-contributory due to uncooperation and alternating agitation and somnolence. HPI is written with review of previous medical records, ED records, discussed with patient's mother via telephone, who was able to provide limited insight into the patient's acute medical condition. Patient's mother states that patient's PO intake has decreased but he would not cooperate with her nor would he tell her what was bothering him. Patient reported to want to stay in bed for the last 2 days.     Of note, patient was recently discharged from [facility] following treatment of DKA and UTI (largely pan-sensitive Proteus mirabilis). In ED, patient was extremely agitated, given multiple doses of Ativan due to agitation, aggressive/abusive statements towards nursing and medical staff. Tachycardic to 110s, hypertensive, febrile to 101.5. Given broad spectrum Abx (Vanc/Zosyn). PEC'ed by ED due to extreme agitation.    Overview/Hospital Course:  Patient admitted for altered mental status. Suspect it was secondary to UTI and COVID infection. UDS negative. Patient was PEC'd while in the ED for agitation. Patients mental status improving. Will consult psychiatry if patient needs psych placement. Finished remdesivir course, not hypoxic. Improving respiratory status. Minor episodes of cough.  Reviewed previous A1C from 2 months on 11.3. uptitrated insulin regimen. Psychiatry following, patient with intermittent explosive disorder, and recommend maintaining current regimen, as well as PEC as he is confused. Current presentation consistent with encephalopathy rather than psychosis or vero. Psych not recommending Psych placement. Once he is back to baseline, they will reassess need for psych placement. Titrating blood glucose. Continues on PO cefpodoxime. Neurology consulted for NPH, recommending outpatient neurosurgery evaluation. Episode of hypertension, controlled wit PRN labetalol. Started on PO amlodipine for BP control. Decreased risperidone dose as per psychiatry recs given patient's tone is increased on examination. Hypoglycemic overnight, started on D10 bolus, improved. Will reach out again to family for new collaterals on patient baseline status.    Interval History: Patient appears a little more alert and oriented. He tells me that he lives with his mom at home. He knows that he is in the hospital. Patient with some low sugars and given some D5. Discussed with nursing staff to continue his Levemir, but hold his short acting aspart if he is not eating a lot.    Review of Systems   Unable to perform ROS: Psychiatric disorder     Objective:     Vital Signs (Most Recent):  Temp: 97.2 °F (36.2 °C) (03/11/24 0740)  Pulse: 93 (03/11/24 0740)  Resp: 18 (03/11/24 0740)  BP: 129/74 (03/11/24 0740)  SpO2: (!) 91 % (03/11/24 0740) Vital Signs (24h Range):  Temp:  [97.2 °F (36.2 °C)-98.2 °F (36.8 °C)] 97.2 °F (36.2 °C)  Pulse:  [74-93] 93  Resp:  [16-20] 18  SpO2:  [91 %-96 %] 91 %  BP: (114-130)/(54-74) 129/74     Weight: 63.5 kg (140 lb)  Body mass index is 18.99 kg/m².    Intake/Output Summary (Last 24 hours) at 3/11/2024 0916  Last data filed at 3/11/2024 0452  Gross per 24 hour   Intake 180 ml   Output 400 ml   Net -220 ml         Physical Exam  Constitutional:       Comments: Disheveled    HENT:       Head: Normocephalic.   Eyes:      General: No scleral icterus.     Extraocular Movements: Extraocular movements intact.   Cardiovascular:      Rate and Rhythm: Normal rate and regular rhythm.   Pulmonary:      Effort: No respiratory distress.      Breath sounds: Normal breath sounds. No wheezing or rales.   Abdominal:      General: There is no distension.      Palpations: Abdomen is soft.   Musculoskeletal:      Cervical back: Normal range of motion.   Skin:     General: Skin is warm and dry.      Findings: No rash.   Neurological:      Mental Status: He is alert.   Psychiatric:         Mood and Affect: Affect is labile.         Speech: Speech is delayed.         Behavior: Behavior is slowed.         Cognition and Memory: Cognition is impaired.      Comments: Oriented to person and place.             Significant Labs: All pertinent labs within the past 24 hours have been reviewed.  CBC:   Recent Labs   Lab 03/11/24  0504   WBC 4.64   HGB 12.0*   HCT 38.2*        CMP:   Recent Labs   Lab 03/11/24  0504      K 3.7      CO2 22*   GLU 51*   BUN 25*   CREATININE 1.0   CALCIUM 9.0   ANIONGAP 10       Significant Imaging: I have reviewed all pertinent imaging results/findings within the past 24 hours.    Assessment/Plan:      * Acute encephalopathy  Likely 2/2 to drug use, but underlying COVID+ infection and possible UTI may be contributing factors. Utox negative, but reported history of recent methamphetamine use.     -- Patient improving on mental status   -- ED PEC'ed patient prior to admission to .   -- Capacity evaluation QD  -- PRN Zyprexa for non-redirectable agitation / violence  -- Treat underlying infection  -- See Bipolar Disorder A/P for home medications  - called family for collaterals, no response    Constipation  CTAP wc s/o moderate stool burden, diverticula w/o infection.     -- Aggressive bowel regimen  - pending KUB      COVID  Patient presenting with incidental COVID+ 03/04/2024. No  symptoms of dyspnea, shortness of breath. Presenting for unspecified weakness. No hypoxemia.  COVID-19 testing:   Isolation: Airborne/Droplet. Surgical mask on patient. Notify Infection Control. Finished remdesivir management. No steroids given not hypoxemic.     -- No O2 requirement at this time  -- COVID risk score: 5; will initiate High-Risk Remdesivir 3 day course  -- No role for Dexamethasone d/t normal SpO2 on RA  -- No role for CAP coverage d/t lack of focal consolidation on CXR  -- No role for therapeutic AC d/t normal SpO2 on RA  -- Acetaminophen 650mg PO Q6hr PRN fever/headache  -- IVF if indicated, restrictive strategy preferred, no maintenance IV if able  -- antitussives: dextromethorphan/guaifenesin, tessalon PRN  -- albuterol: 2-4 puff q6h if patient wheezing        Acute cystitis without hematuria  UA with Nitrite+. Unable to discern symptoms d/t poor cooperation during ROS. Febrile to 101.5, although this may be related to COVID+. Previous UCX with largely pan-sensitive Proteus mirabilis.    -- No WBC or bacteria in UA makes acute UTI less certain; however, given Nitrite+ and new fever, will treat as UTI.  - discontinue cefpodoxime     Bipolar disorder  -- Continue home medication regimen: carbamazepime, venlafaxine, risperidone  -- continue risperidone 1mg in AM and QHS        Intermittent explosive disorder  Psychiatry following, patient with intermittent explosive disorder, and recommend maintaining current regimen, as well as PEC as he is confused. Current presentation consistent with encephalopathy rather than psychosis or vero. Psych not recommending Psych placement. Once he is back to baseline, they will reassess need for psych placement.       Type 1 diabetes mellitus with hyperglycemia  Patient's FSGs are uncontrolled due to hyperglycemia on current medication regimen.  Last A1c reviewed-   Lab Results   Component Value Date    HGBA1C 10.1 (H) 03/07/2024     Most recent fingerstick glucose  reviewed-   Recent Labs   Lab 03/11/24  0743 03/11/24  0744 03/11/24  0800 03/11/24  0802   POCTGLUCOSE 56* 55* 171* 178*       Current correctional scale  Low    Antihyperglycemics (From admission, onward)      Start     Stop Route Frequency Ordered    03/11/24 0900  insulin detemir U-100 (Levemir) pen 10 Units         -- SubQ 2 times daily 03/11/24 0850    03/10/24 1130  insulin aspart U-100 pen 5 Units         -- SubQ 3 times daily with meals 03/10/24 0757    03/07/24 1649  insulin aspart U-100 pen 0-5 Units         -- SubQ Before meals & nightly PRN 03/07/24 1550            -- Hold oral hyperglycemics  -- SSI, POCT BG qACHS; titrate basal/bolus regimen PRN to 140-180 goal  -- Diabetic diet  - Detemir 10U BID / Aspart 5U TIDWM + LDSSI   - Current A1C 10.1. Reviewed previous A1C on 11.3  - patient non-compliant with insulin regimen at home     CAD (coronary artery disease)  Hyperlipidemia    Known CAD. Patient not exhibiting ACS symptoms at this time.     Last known LHC done 09/2023:  1.  60% in stent restenosis of previously placed right coronary artery stent.  2.  Successful balloon angioplasty with a 3 0 x 20 fall by 3.5 x 20 noncompliant balloon.  60% stenosis reduced to 0%.  YENI 3 flow.  No dissection.  3.  50% stenosis noted in the mid and distal left anterior descending artery.  There is a 70 % stenosis at the apex, small caliber.    -- Continue HI Statin  -- Continue ASA 81  -- Hold Plavix as patient's > 1 year from last PCI    COPD (chronic obstructive pulmonary disease)  Patient's COPD is controlled currently.  Patient is currently off COPD Pathway.     -PRN inhalers    Sepsis  Currently not having sepsis criteria. Positive nitrates on admission. On 5 day course antibiotic.     - Finished 7 day course on 3/10/24, stopped cefpodoxime.    Polysubstance use disorder  History of recent methamphetamine, tobacco, alcohol use. Likely contributor to psychiatric disorder and agitation      Hemiparesis affecting  right side as late effect of stroke  Noted per history    Anemia of chronic disease  Patient's anemia is currently controlled. Has not received any PRBCs to date. Etiology likely d/t chronic disease due to recurrent infections, inflammation  Current CBC reviewed-   Lab Results   Component Value Date    HGB 12.0 (L) 03/11/2024    HCT 38.2 (L) 03/11/2024     Monitor serial CBC and transfuse if patient becomes hemodynamically unstable, symptomatic or H/H drops below 7/21.    Primary hypertension  Chronic, controlled. Latest blood pressure and vitals reviewed-     Temp:  [97.2 °F (36.2 °C)-98.2 °F (36.8 °C)]   Pulse:  [74-93]   Resp:  [16-20]   BP: (114-130)/(54-74)   SpO2:  [91 %-96 %] .   Home meds for hypertension were reviewed and noted below.   Hypertension Medications               losartan (COZAAR) 50 MG tablet Take 1 tablet (50 mg total) by mouth once daily.    metoprolol succinate (TOPROL-XL) 25 MG 24 hr tablet Take 1 tablet (25 mg total) by mouth once daily.          - on losartan 50 as IP   - PRN labetalol       VTE Risk Mitigation (From admission, onward)           Ordered     enoxaparin injection 40 mg  Daily         03/04/24 1904     IP VTE HIGH RISK PATIENT  Once         03/04/24 1904     Place sequential compression device  Until discontinued         03/04/24 1904                    Discharge Planning   ROLA: 3/8/2024     Code Status: Full Code   Is the patient medically ready for discharge?: No    Reason for patient still in hospital (select all that apply): Patient trending condition, Consult recommendations, and Pending disposition  Discharge Plan A: [facility], later [facility]                  Kin Kolb MD  Department of Hospital Medicine   [facility] S[facility]

## 2024-03-11 NOTE — PLAN OF CARE
Problem: SLP  Goal: SLP Goal  Outcome: Met   Bedside swallow study completed. Recommend: Minced and moist diet, thin liquids, assistance with meals, alternate bites and sips, check for pocketing, and standard aspiration precautions. No further skilled acute Speech Therapy services warranted at this time. Please re-consult as needed.

## 2024-03-11 NOTE — ASSESSMENT & PLAN NOTE
Patient's FSGs are uncontrolled due to hyperglycemia on current medication regimen.  Last A1c reviewed-   Lab Results   Component Value Date    HGBA1C 10.1 (H) 03/07/2024     Most recent fingerstick glucose reviewed-   Recent Labs   Lab 03/11/24  0743 03/11/24  0744 03/11/24  0800 03/11/24  0802   POCTGLUCOSE 56* 55* 171* 178*       Current correctional scale  Low    Antihyperglycemics (From admission, onward)      Start     Stop Route Frequency Ordered    03/11/24 0900  insulin detemir U-100 (Levemir) pen 10 Units         -- SubQ 2 times daily 03/11/24 0850    03/10/24 1130  insulin aspart U-100 pen 5 Units         -- SubQ 3 times daily with meals 03/10/24 0757    03/07/24 1649  insulin aspart U-100 pen 0-5 Units         -- SubQ Before meals & nightly PRN 03/07/24 1550            -- Hold oral hyperglycemics  -- SSI, POCT BG qACHS; titrate basal/bolus regimen PRN to 140-180 goal  -- Diabetic diet  - Detemir 10U BID / Aspart 5U TIDWM + LDSSI   - Current A1C 10.1. Reviewed previous A1C on 11.3  - patient non-compliant with insulin regimen at home

## 2024-03-11 NOTE — PT/OT/SLP EVAL
Speech Language Pathology Evaluation  Bedside Swallow    Patient Name:  Cali Mabry   MRN:  [id number]  640/[id number]    Admitting Diagnosis: Acute encephalopathy    Recommendations:                 General Recommendations:  Follow-up not indicated  Diet recommendations:  Minced & Moist Diet - IDDSI Level 5, Thin liquids - IDDSI Level 0   Aspiration Precautions: 1 bite/sip at a time, Alternating bites/sips, Assistance with meals, Check for pocketing/oral residue, Eliminate distractions, Feed only when awake/alert, Frequent oral care, HOB to 90 degrees, Remain upright 30 minutes post meal, Small bites/sips, and Strict aspiration precautions   General Precautions: Standard, aspiration, fall  Communication strategies:  none    Assessment:     Cali Mabry is a 58 y.o. male with adequate tolerance of baseline soft solids and thin liquids with aspiration precautions in place. No further skilled acute Speech Therapy services warranted at this time. Please re-consult as needed.      History:     Past Medical History:   Diagnosis Date    Acute on chronic pancreatitis 04/03/2016    Bacterial pneumonia 02/29/2020    Cavitary lung lesions  Parapneumonic effusion/ EMPYEMA    CHF (congestive heart failure)     Closed compression fracture of body of L1 vertebra 05/09/2020    COPD (chronic obstructive pulmonary disease) 08/08/2020    DM (diabetes mellitus), type 1     c/b DKA    HCV (hepatitis C virus)     08/2023 count < 12    HTN (hypertension)     Hx of psychiatric care     Stroke     R sided deficits    Substance abuse     IVDU heroin, methamphetamines       Past Surgical History:   Procedure Laterality Date    CARDIAC SURGERY  1999    stent placed. ([facility])    ESOPHAGOGASTRODUODENOSCOPY N/A 3/5/2020    Gastritis.  No H pylori.  Completed PPI for 1 month.  Procedure: EGD (ESOPHAGOGASTRODUODENOSCOPY);  Surgeon: Brinda Rene MD;  Location: [facility];  Service: Endoscopy;  Laterality: N/A;  [id number]; [phone]     LEFT HEART CATHETERIZATION Left 9/28/2020    Procedure: Left heart cath;  Surgeon: Fito Rangel MD;  Location: [facility] CATH LAB;  Service: Cardiology;  Laterality: Left;    SHOULDER SURGERY  2013    right shoulder, spur removal.       Chest X-Rays: Heart size normal. The lungs are clear. No pleural effusion     Prior diet: soft/thin (minced and moist) since previous admission in December when patient began pocketing. During admission in December 2023, patient;s mother reported patient began pocketing prior to that admission. Patient reporting to SLP today he eats soft foods at home (patient is not a reliable  at this time).    Subjective     Patient awake and cooperative.   Sitter present in room.   Patient goals: none sated     Pain/Comfort:  Pain Rating 1: 0/10      Objective:     Oral Musculature Evaluation  Oral Musculature: WFL  Dentition: edentulous  Mucosal Quality: adequate  Oral Labial Strength and Mobility: WFL  Lingual Strength and Mobility: WFL  Volitional Cough: wet; nonproductive  Volitional Swallow: timely  Voice Prior to PO Intake: clear  Oral Musculature Comments: patietn with a wet/nonproductive cough prior to po trials (current COVID dx)    Bedside Swallow Eval:   Consistencies Assessed:  Thin liquids sips of water and milk via teaspoon and straw  Soft solids bites of cereal softened in milk  Solids bites of dry cereal      Oral Phase:   Prolonged mastication  Slow oral transit time    Pharyngeal Phase:   Patient with a nonproductive cough prior to and during po trials not suspected to be related to swallowing. Coughing is not consistent with trials or immediate following trials.     Compensatory Strategies  Alternate bites and sips    Treatment: SLP provided patient education on SLP recommendations, SLP role, safe swallow precautions, and POC. Patient nodded head in understanding, however, would benefit from ongoing reinforcement of safe swallowing precautions. Sitter present in room  and v/u of safe swallowing precautions (assistance with meals, check for pocketing, alt bites/sips).     Goals:   Multidisciplinary Problems       SLP Goals       Not on file              Multidisciplinary Problems (Resolved)          Problem: SLP    Goal Priority Disciplines Outcome   SLP Goal   (Resolved)     SLP Met                       Plan:       Plan of Care reviewed with:  patient   SLP Follow-Up:  No       Discharge recommendations:  No Therapy Indicated   Barriers to Discharge:  None    Time Tracking:     SLP Treatment Date:   03/11/24  Speech Start Time:  0717  Speech Stop Time:  0733     Speech Total Time (min):  16 min    Billable Minutes: Eval 8  and Self Care/Home Management Training 8    03/11/2024

## 2024-03-12 LAB
ANION GAP SERPL CALC-SCNC: 6 MMOL/L (ref 8–16)
BUN SERPL-MCNC: 26 MG/DL (ref 6–20)
CALCIUM SERPL-MCNC: 9.1 MG/DL (ref 8.7–10.5)
CHLORIDE SERPL-SCNC: 106 MMOL/L (ref 95–110)
CO2 SERPL-SCNC: 27 MMOL/L (ref 23–29)
CREAT SERPL-MCNC: 1 MG/DL (ref 0.5–1.4)
EST. GFR  (NO RACE VARIABLE): >60 ML/MIN/1.73 M^2
GLUCOSE SERPL-MCNC: 69 MG/DL (ref 70–110)
POCT GLUCOSE: 110 MG/DL (ref 70–110)
POCT GLUCOSE: 169 MG/DL (ref 70–110)
POCT GLUCOSE: 187 MG/DL (ref 70–110)
POCT GLUCOSE: 272 MG/DL (ref 70–110)
POCT GLUCOSE: 290 MG/DL (ref 70–110)
POTASSIUM SERPL-SCNC: 4.1 MMOL/L (ref 3.5–5.1)
SODIUM SERPL-SCNC: 139 MMOL/L (ref 136–145)

## 2024-03-12 PROCEDURE — 25000003 PHARM REV CODE 250: Performed by: STUDENT IN AN ORGANIZED HEALTH CARE EDUCATION/TRAINING PROGRAM

## 2024-03-12 PROCEDURE — 21400001 HC TELEMETRY ROOM

## 2024-03-12 PROCEDURE — 36415 COLL VENOUS BLD VENIPUNCTURE: CPT | Performed by: STUDENT IN AN ORGANIZED HEALTH CARE EDUCATION/TRAINING PROGRAM

## 2024-03-12 PROCEDURE — 25000003 PHARM REV CODE 250

## 2024-03-12 PROCEDURE — 94761 N-INVAS EAR/PLS OXIMETRY MLT: CPT

## 2024-03-12 PROCEDURE — 27000207 HC ISOLATION

## 2024-03-12 PROCEDURE — 25000003 PHARM REV CODE 250: Performed by: HOSPITALIST

## 2024-03-12 PROCEDURE — 97161 PT EVAL LOW COMPLEX 20 MIN: CPT

## 2024-03-12 PROCEDURE — 80048 BASIC METABOLIC PNL TOTAL CA: CPT | Performed by: STUDENT IN AN ORGANIZED HEALTH CARE EDUCATION/TRAINING PROGRAM

## 2024-03-12 RX ORDER — OLANZAPINE 10 MG/2ML
5 INJECTION, POWDER, FOR SOLUTION INTRAMUSCULAR ONCE AS NEEDED
Status: DISCONTINUED | OUTPATIENT
Start: 2024-03-12 | End: 2024-03-15

## 2024-03-12 RX ORDER — CARBAMAZEPINE 200 MG/1
200 TABLET ORAL DAILY
Status: DISCONTINUED | OUTPATIENT
Start: 2024-03-13 | End: 2024-03-19 | Stop reason: HOSPADM

## 2024-03-12 RX ORDER — CARBAMAZEPINE 200 MG/1
400 TABLET ORAL NIGHTLY
Status: DISCONTINUED | OUTPATIENT
Start: 2024-03-12 | End: 2024-03-19 | Stop reason: HOSPADM

## 2024-03-12 RX ORDER — LOSARTAN POTASSIUM 50 MG/1
100 TABLET ORAL DAILY
Status: DISCONTINUED | OUTPATIENT
Start: 2024-03-12 | End: 2024-03-19 | Stop reason: HOSPADM

## 2024-03-12 RX ADMIN — Medication 6 MG: at 09:03

## 2024-03-12 RX ADMIN — INSULIN ASPART 3 UNITS: 100 INJECTION, SOLUTION INTRAVENOUS; SUBCUTANEOUS at 11:03

## 2024-03-12 RX ADMIN — NIFEDIPINE 60 MG: 60 TABLET, FILM COATED, EXTENDED RELEASE ORAL at 09:03

## 2024-03-12 RX ADMIN — POLYETHYLENE GLYCOL 3350 17 G: 17 POWDER, FOR SOLUTION ORAL at 09:03

## 2024-03-12 RX ADMIN — ATORVASTATIN CALCIUM 20 MG: 20 TABLET, FILM COATED ORAL at 09:03

## 2024-03-12 RX ADMIN — CARBAMAZEPINE 400 MG: 200 TABLET ORAL at 09:03

## 2024-03-12 RX ADMIN — INSULIN DETEMIR 8 UNITS: 100 INJECTION, SOLUTION SUBCUTANEOUS at 09:03

## 2024-03-12 RX ADMIN — INSULIN ASPART 5 UNITS: 100 INJECTION, SOLUTION INTRAVENOUS; SUBCUTANEOUS at 05:03

## 2024-03-12 RX ADMIN — LOSARTAN POTASSIUM 100 MG: 50 TABLET, FILM COATED ORAL at 09:03

## 2024-03-12 RX ADMIN — INSULIN ASPART 3 UNITS: 100 INJECTION, SOLUTION INTRAVENOUS; SUBCUTANEOUS at 05:03

## 2024-03-12 RX ADMIN — SENNOSIDES AND DOCUSATE SODIUM 1 TABLET: 8.6; 5 TABLET ORAL at 09:03

## 2024-03-12 RX ADMIN — VENLAFAXINE HYDROCHLORIDE 75 MG: 75 CAPSULE, EXTENDED RELEASE ORAL at 09:03

## 2024-03-12 RX ADMIN — ASPIRIN 81 MG CHEWABLE TABLET 81 MG: 81 TABLET CHEWABLE at 09:03

## 2024-03-12 RX ADMIN — Medication 100 MG: at 09:03

## 2024-03-12 RX ADMIN — FOLIC ACID 1 MG: 1 TABLET ORAL at 09:03

## 2024-03-12 RX ADMIN — THERA TABS 1 TABLET: TAB at 09:03

## 2024-03-12 RX ADMIN — INSULIN ASPART 5 UNITS: 100 INJECTION, SOLUTION INTRAVENOUS; SUBCUTANEOUS at 11:03

## 2024-03-12 RX ADMIN — POLYETHYLENE GLYCOL 3350 17 G: 17 POWDER, FOR SOLUTION ORAL at 03:03

## 2024-03-12 RX ADMIN — BENZONATATE 100 MG: 100 CAPSULE ORAL at 09:03

## 2024-03-12 RX ADMIN — DEXTROSE MONOHYDRATE 125 ML: 100 INJECTION, SOLUTION INTRAVENOUS at 06:03

## 2024-03-12 NOTE — ASSESSMENT & PLAN NOTE
CTAP wc s/o moderate stool burden, diverticula w/o infection. Continue aggressive bowel regimen. Negative KUB.

## 2024-03-12 NOTE — PROGRESS NOTES
[facility] Medicine  Progress Note    Patient Name: Cali Mabry  MRN: [id number]  Patient Class: IP- Inpatient   Admission Date: 3/4/2024  Length of Stay: 8 days  Attending Physician: Siddhartha Zavala MD  Primary Care Provider: Lawanda Flores NP        Subjective:     Principal Problem:Acute encephalopathy        HPI:  58 M with DM1, extensive IVDU, HCV Ab+, HTN, previous CVA with R sided deficits, HFpEF, CAD, chronic pancreatitis, mixed mood / psychiatric disorder presenting to [facility] ED with chief complaint of weakness of unspecified duration and onset. Admits to using shooting methamphetamines. Rest of history from patient is non-contributory due to uncooperation and alternating agitation and somnolence. HPI is written with review of previous medical records, ED records, discussed with patient's mother via telephone, who was able to provide limited insight into the patient's acute medical condition. Patient's mother states that patient's PO intake has decreased but he would not cooperate with her nor would he tell her what was bothering him. Patient reported to want to stay in bed for the last 2 days.     Of note, patient was recently discharged from [facility] following treatment of DKA and UTI (largely pan-sensitive Proteus mirabilis). In ED, patient was extremely agitated, given multiple doses of Ativan due to agitation, aggressive/abusive statements towards nursing and medical staff. Tachycardic to 110s, hypertensive, febrile to 101.5. Given broad spectrum Abx (Vanc/Zosyn). PEC'ed by ED due to extreme agitation.    Overview/Hospital Course:  Patient admitted for altered mental status. Suspect it was secondary to UTI and COVID infection. UDS negative. Patient was PEC'd while in the ED for agitation. Patients mental status improving. Will consult psychiatry if patient needs psych placement. Finished remdesivir course, not hypoxic. Improving respiratory status. Minor episodes of cough.  Reviewed previous A1C from 2 months on 11.3. uptitrated insulin regimen. Psychiatry following, patient with intermittent explosive disorder, and recommend maintaining current regimen, as well as PEC as he is confused. Current presentation consistent with encephalopathy rather than psychosis or vero. Psych not recommending Psych placement. Once he is back to baseline, they will reassess need for psych placement. Titrating blood glucose. Continues on PO cefpodoxime. Neurology consulted for NPH, recommending outpatient neurosurgery evaluation. Episode of hypertension, controlled wit PRN labetalol. Started on PO amlodipine for BP control. Decreased risperidone dose as per psychiatry recs given patient's tone is increased on examination. Hypoglycemic overnight, started on D10 bolus, improved. Will reach out again to family for new collaterals on patient baseline status. Somnolent on examination. Optimizing blood glucose management, PRN zyprexa for agitation only. Increased tegretol dose to 200 AM, 400PM as per psychiatry recommendations. Continue holding risperidone.     Interval History: somnolent on examination. Modified zyprexa for agitation only. Optimized tegretol management.     Review of Systems   Unable to perform ROS: Psychiatric disorder     Objective:     Vital Signs (Most Recent):  Temp: 97.1 °F (36.2 °C) (03/12/24 1055)  Pulse: 87 (03/12/24 1055)  Resp: 18 (03/12/24 1055)  BP: 120/73 (03/12/24 1055)  SpO2: (!) 90 % (03/12/24 1055) Vital Signs (24h Range):  Temp:  [97.1 °F (36.2 °C)-97.9 °F (36.6 °C)] 97.1 °F (36.2 °C)  Pulse:  [77-99] 87  Resp:  [16-20] 18  SpO2:  [90 %-97 %] 90 %  BP: (111-186)/(66-95) 120/73     Weight: 63.5 kg (140 lb)  Body mass index is 18.99 kg/m².    Intake/Output Summary (Last 24 hours) at 3/12/2024 1331  Last data filed at 3/12/2024 0408  Gross per 24 hour   Intake 180 ml   Output 1050 ml   Net -870 ml         Physical Exam  Constitutional:       Comments: Disheveled    HENT:       Head: Normocephalic.   Eyes:      General: No scleral icterus.     Extraocular Movements: Extraocular movements intact.   Cardiovascular:      Rate and Rhythm: Normal rate and regular rhythm.   Pulmonary:      Effort: No respiratory distress.      Breath sounds: Normal breath sounds. No wheezing or rales.   Abdominal:      General: There is no distension.      Palpations: Abdomen is soft.   Musculoskeletal:      Cervical back: Normal range of motion.   Skin:     General: Skin is warm and dry.      Findings: No rash.   Neurological:      Mental Status: He is alert.   Psychiatric:         Mood and Affect: Affect is labile.         Speech: Speech is delayed.         Behavior: Behavior is slowed.         Cognition and Memory: Cognition is impaired.      Comments: Oriented to person and place.             Significant Labs: All pertinent labs within the past 24 hours have been reviewed.    Significant Imaging: I have reviewed all pertinent imaging results/findings within the past 24 hours.    Assessment/Plan:      * Acute encephalopathy  Likely 2/2 to drug use, but underlying COVID+ infection and possible UTI may be contributing factors. Utox negative, but reported history of recent methamphetamine use.     -- Patient improving on mental status   -- ED PEC'ed patient prior to admission to .   -- Capacity evaluation QD  -- PRN Zyprexa for non-redirectable agitation / violence  -- Treat underlying infection  -- See Bipolar Disorder A/P for home medications    Constipation  CTAP wc s/o moderate stool burden, diverticula w/o infection. Continue aggressive bowel regimen. Negative KUB.      COVID  Patient presenting with incidental COVID+ 03/04/2024. No symptoms of dyspnea, shortness of breath. Presenting for unspecified weakness. No hypoxemia.  COVID-19 testing:   Isolation: Airborne/Droplet. Surgical mask on patient. Notify Infection Control. Finished remdesivir management. No steroids given not hypoxemic.     -- No O2  requirement at this time  -- COVID risk score: 5; will initiate High-Risk Remdesivir 3 day course  -- No role for Dexamethasone d/t normal SpO2 on RA  -- No role for CAP coverage d/t lack of focal consolidation on CXR  -- No role for therapeutic AC d/t normal SpO2 on RA  -- Acetaminophen 650mg PO Q6hr PRN fever/headache  -- IVF if indicated, restrictive strategy preferred, no maintenance IV if able  -- antitussives: dextromethorphan/guaifenesin, tessalon PRN  -- albuterol: 2-4 puff q6h if patient wheezing        Acute cystitis without hematuria  -Resolved. Finished cefpodoxime management.     Bipolar disorder  -- Continue home medication regimen:  -venlafaxine  - carbamazepine 200 AM, 400 PM   - hold risperidone      Intermittent explosive disorder  Psychiatry following, patient with intermittent explosive disorder, and recommend maintaining current regimen, as well as PEC as he is confused. Current presentation consistent with encephalopathy rather than psychosis or vero. Psych not recommending Psych placement. Once he is back to baseline, they will reassess need for psych placement.       Type 1 diabetes mellitus with hyperglycemia  Patient's FSGs are uncontrolled due to hyperglycemia on current medication regimen.  Last A1c reviewed-   Lab Results   Component Value Date    HGBA1C 10.1 (H) 03/07/2024     Most recent fingerstick glucose reviewed-   Recent Labs   Lab 03/11/24  2056 03/12/24  0652 03/12/24  0856 03/12/24  1254   POCTGLUCOSE 84 187* 169* 290*       Current correctional scale  Low    Antihyperglycemics (From admission, onward)      Start     Stop Route Frequency Ordered    03/12/24 0900  insulin detemir U-100 (Levemir) pen 8 Units         -- SubQ 2 times daily 03/12/24 0814    03/11/24 1130  insulin aspart U-100 pen 5 Units         -- SubQ 3 times daily with meals 03/11/24 0921    03/07/24 1649  insulin aspart U-100 pen 0-5 Units         -- SubQ Before meals & nightly PRN 03/07/24 1550            --  Hold oral hyperglycemics  -- SSI, POCT BG qACHS; titrate basal/bolus regimen PRN to 140-180 goal  -- Diabetic diet  - New episode of hypoglycemia in the 80s, given 125cc bolus D10, within goal.   - Detemir 8U BID / Aspart 5U TIDWM + LDSSI   - Current A1C 10.1. Reviewed previous A1C on 11.3  - patient non-compliant with insulin regimen at home     CAD (coronary artery disease)  Hyperlipidemia    Known CAD. Patient not exhibiting ACS symptoms at this time.     Last known LHC done 09/2023:  1.  60% in stent restenosis of previously placed right coronary artery stent.  2.  Successful balloon angioplasty with a 3 0 x 20 fall by 3.5 x 20 noncompliant balloon.  60% stenosis reduced to 0%.  YENI 3 flow.  No dissection.  3.  50% stenosis noted in the mid and distal left anterior descending artery.  There is a 70 % stenosis at the apex, small caliber.    -- Continue HI Statin  -- Continue ASA 81  -- Hold Plavix as patient's > 1 year from last PCI    COPD (chronic obstructive pulmonary disease)  Patient's COPD is controlled currently.  Patient is currently off COPD Pathway.     -PRN inhalers    Sepsis  Currently not having sepsis criteria. Positive nitrates on admission. Finished AB management.     Polysubstance use disorder  History of recent methamphetamine, tobacco, alcohol use. Likely contributor to psychiatric disorder and agitation      Hemiparesis affecting right side as late effect of stroke  Noted per history    Anemia of chronic disease  Patient's anemia is currently controlled. Has not received any PRBCs to date. Etiology likely d/t chronic disease due to recurrent infections, inflammation  Current CBC reviewed-   Lab Results   Component Value Date    HGB 12.0 (L) 03/11/2024    HCT 38.2 (L) 03/11/2024     Monitor serial CBC and transfuse if patient becomes hemodynamically unstable, symptomatic or H/H drops below 7/21.    Primary hypertension  Chronic, controlled. Latest blood pressure and vitals reviewed-     Temp:   [97.1 °F (36.2 °C)-97.9 °F (36.6 °C)]   Pulse:  [77-99]   Resp:  [16-20]   BP: (111-186)/(66-95)   SpO2:  [90 %-97 %] .   Home meds for hypertension were reviewed and noted below.   Hypertension Medications               losartan (COZAAR) 50 MG tablet Take 1 tablet (50 mg total) by mouth once daily.    metoprolol succinate (TOPROL-XL) 25 MG 24 hr tablet Take 1 tablet (25 mg total) by mouth once daily.          - on losartan 50 and nifedipine 60 as IP   - PRN labetalol       VTE Risk Mitigation (From admission, onward)           Ordered     enoxaparin injection 40 mg  Daily         03/04/24 1904     IP VTE HIGH RISK PATIENT  Once         03/04/24 1904     Place sequential compression device  Until discontinued         03/04/24 1904                    Discharge Planning   ROLA: 3/12/2024     Code Status: Full Code   Is the patient medically ready for discharge?: No    Reason for patient still in hospital (select all that apply): Patient trending condition  Discharge Plan A: Psychiatric hospital   Discharge Delays: None known at this time        Kaleb Monson MD  Department of Hospital Medicine   [facility] - Med Surg

## 2024-03-12 NOTE — ASSESSMENT & PLAN NOTE
Patient's FSGs are uncontrolled due to hyperglycemia on current medication regimen.  Last A1c reviewed-   Lab Results   Component Value Date    HGBA1C 10.1 (H) 03/07/2024     Most recent fingerstick glucose reviewed-   Recent Labs   Lab 03/11/24  2056 03/12/24  0652 03/12/24  0856 03/12/24  1254   POCTGLUCOSE 84 187* 169* 290*       Current correctional scale  Low    Antihyperglycemics (From admission, onward)      Start     Stop Route Frequency Ordered    03/12/24 0900  insulin detemir U-100 (Levemir) pen 8 Units         -- SubQ 2 times daily 03/12/24 0814    03/11/24 1130  insulin aspart U-100 pen 5 Units         -- SubQ 3 times daily with meals 03/11/24 0921    03/07/24 1649  insulin aspart U-100 pen 0-5 Units         -- SubQ Before meals & nightly PRN 03/07/24 1550            -- Hold oral hyperglycemics  -- SSI, POCT BG qACHS; titrate basal/bolus regimen PRN to 140-180 goal  -- Diabetic diet  - New episode of hypoglycemia in the 80s, given 125cc bolus D10, within goal.   - Detemir 8U BID / Aspart 5U TIDWM + LDSSI   - Current A1C 10.1. Reviewed previous A1C on 11.3  - patient non-compliant with insulin regimen at home

## 2024-03-12 NOTE — PLAN OF CARE
Problem: Infection  Goal: Absence of Infection Signs and Symptoms  3/12/2024 0407 by Alexsandra Roberts RN  Outcome: Ongoing, Progressing  3/12/2024 0407 by Alexsandra Roberts RN  Outcome: Ongoing, Progressing     Problem: Adult Inpatient Plan of Care  Goal: Plan of Care Review  3/12/2024 0407 by Alexsandra Roberts RN  Outcome: Ongoing, Progressing  3/12/2024 0407 by Alexsandra Roberts RN  Outcome: Ongoing, Progressing  Goal: Patient-Specific Goal (Individualized)  3/12/2024 0407 by Alexsandra Roberts RN  Outcome: Ongoing, Progressing  3/12/2024 0407 by Alexsandra Roberts RN  Outcome: Ongoing, Progressing  Goal: Absence of Hospital-Acquired Illness or Injury  3/12/2024 0407 by Alexsandra Roberts RN  Outcome: Ongoing, Progressing  3/12/2024 0407 by Alexsandra Roberts RN  Outcome: Ongoing, Progressing  Goal: Optimal Comfort and Wellbeing  3/12/2024 0407 by Alexsandra Roberts RN  Outcome: Ongoing, Progressing  3/12/2024 0407 by Alexsandra Roberts RN  Outcome: Ongoing, Progressing  Goal: Readiness for Transition of Care  3/12/2024 0407 by Alexsandra Roberts RN  Outcome: Ongoing, Progressing  3/12/2024 0407 by Alexsandra Roberts RN  Outcome: Ongoing, Progressing     Problem: Violence Risk or Actual  Goal: Anger and Impulse Control  3/12/2024 0407 by Alexsandra Roberts RN  Outcome: Ongoing, Progressing  3/12/2024 0407 by Alexsandra Roberts RN  Outcome: Ongoing, Progressing     Problem: Diabetes Comorbidity  Goal: Blood Glucose Level Within Targeted Range  3/12/2024 0407 by Alexsandra Roberts RN  Outcome: Ongoing, Progressing  3/12/2024 0407 by Alexsandra Roberts RN  Outcome: Ongoing, Progressing     Problem: Impaired Wound Healing  Goal: Optimal Wound Healing  3/12/2024 0407 by Alexsandra Roberts RN  Outcome: Ongoing, Progressing  3/12/2024 0407 by Alexsandra Roberts RN  Outcome: Ongoing, Progressing     Problem: Skin Injury Risk Increased  Goal: Skin Health and Integrity  3/12/2024 0407 by Alexsandra Roberts RN  Outcome: Ongoing, Progressing  3/12/2024 0407 by Alexsandra Roberts, RN  Outcome:  Ongoing, Progressing     Problem: Adjustment to Illness (Sepsis/Septic Shock)  Goal: Optimal Coping  3/12/2024 0407 by Alexsandra Roberts RN  Outcome: Ongoing, Progressing  3/12/2024 0407 by Alexsandra Roberts RN  Outcome: Ongoing, Progressing     Problem: Bleeding (Sepsis/Septic Shock)  Goal: Absence of Bleeding  3/12/2024 0407 by Alexsandra Roberts RN  Outcome: Ongoing, Progressing  3/12/2024 0407 by Alexsandra Roberts RN  Outcome: Ongoing, Progressing     Problem: Glycemic Control Impaired (Sepsis/Septic Shock)  Goal: Blood Glucose Level Within Desired Range  3/12/2024 0407 by Alexsandra Roberts RN  Outcome: Ongoing, Progressing  3/12/2024 0407 by Alexsandra Roberts RN  Outcome: Ongoing, Progressing     Problem: Infection Progression (Sepsis/Septic Shock)  Goal: Absence of Infection Signs and Symptoms  3/12/2024 0407 by Alexsandra Roberts RN  Outcome: Ongoing, Progressing  3/12/2024 0407 by Alexsandra Roberts RN  Outcome: Ongoing, Progressing     Problem: Nutrition Impaired (Sepsis/Septic Shock)  Goal: Optimal Nutrition Intake  3/12/2024 0407 by Alexsandra Roberts RN  Outcome: Ongoing, Progressing  3/12/2024 0407 by Alexsandra Roberts RN  Outcome: Ongoing, Progressing     Problem: Fall Injury Risk  Goal: Absence of Fall and Fall-Related Injury  3/12/2024 0407 by Alexsandra Roberts RN  Outcome: Ongoing, Progressing  3/12/2024 0407 by Alexsandra Roberts RN  Outcome: Ongoing, Progressing     Problem: Restraint, Nonbehavioral (Nonviolent)  Goal: Absence of Harm or Injury  3/12/2024 0407 by Alexsandra Roberts RN  Outcome: Ongoing, Progressing  3/12/2024 0407 by Alexsandra Roberts RN  Outcome: Ongoing, Progressing

## 2024-03-12 NOTE — ASSESSMENT & PLAN NOTE
-- Continue home medication regimen:  -venlafaxine  - carbamazepine 200 AM, 400 PM   - hold risperidone

## 2024-03-12 NOTE — PT/OT/SLP EVAL
"Physical Therapy Co-Evaluation and Treatment     OT present for coeval due to pt's multiple medical comorbidities and functional/cognition deficits requiring two skilled therapists to appropriately progress pt's musculoskeletal strength, neuromuscular control, and endurance while taking into consideration medical acuity and pt safety.    Patient Name:  Cali Mabry   MRN:  [id number]    Recommendations:     Discharge Recommendations: Moderate Intensity Therapy   Discharge Equipment Recommendations: walker, rolling, wheelchair, bedside commode   Barriers to discharge: None    Assessment:     Cali Mabry is a 58 y.o. male admitted with a medical diagnosis of Acute encephalopathy.  He presents with the following impairments/functional limitations: weakness, impaired endurance, impaired functional mobility, gait instability, impaired balance, impaired self care skills, visual deficits, decreased upper extremity function, impaired cognition, decreased lower extremity function     Pt presented with decreased arousal, keeping eyes close throughout most of session, and little to no command following. Pt needed total A and of 2 persons to complete bed mobility to sit EOB. Pt demonstrated little verbal response only replying "yeah" or "no" inconsistently to questions. PT evaluation limited this session, will continue to assess on next visit.    Rehab Prognosis: Fair; patient would benefit from acute skilled PT services to address these deficits and reach maximum level of function.    Recent Surgery: * No surgery found *      Plan:     During this hospitalization, patient to be seen 3 x/week to address the identified rehab impairments via gait training, therapeutic activities, therapeutic exercises, neuromuscular re-education, wheelchair management/training and progress toward the following goals:    Plan of Care Expires:  04/11/24    Subjective     Chief Complaint: none reported  Patient/Family Comments/goals: "   Pain/Comfort:  Pain Rating 1: 0/10    Patients cultural, spiritual, Evangelical conflicts given the current situation: no    Patient History:     Living Environment: Pt lives with niece and nephew and mother in [place] [facility] with 0 GRAY.  Prior Level of Function: unknown at this time, will continue to assess  DME owned: [facility]  Caregiver Assistance: unknown at this time      Objective:     Communicated with RN prior to session.  Patient found HOB elevated with  (no active lines)  and sitter upon PT entry to room.    General Precautions: Standard, fall, aspiration  Orthopedic Precautions:N/A   Braces: N/A  Respiratory Status: Room air    Exams:  BLE ROM WFL  Pt unable to follow commands for strength testing     Functional Mobility:    Bed Mobility:   Rolling: to L with total assistance and of 2 persons  Supine > Sit: total assistance and of 2 persons  Sit > Supine: total assistance and of 2 persons      Balance:   Sitting balance: POOR: N/A  Pt needed max A to maintain balance while sitting EOB                     AM-PAC 6 CLICK MOBILITY  Total Score:8       Treatment & Education:  Pt educated on tip to reduce fall risk and safety with mobility and using call button for assistance from nursing staff with bed mobility.  All questions answered within the scope of PT.  White board updated accordingly.      Patient left HOB elevated with all lines intact, call button in reach, and sitter present.    GOALS:   Multidisciplinary Problems       Physical Therapy Goals          Problem: Physical Therapy    Goal Priority Disciplines Outcome Goal Variances Interventions   Physical Therapy Goal     PT, PT/OT Ongoing, Progressing     Description: Goals to be met by: 2024     Patient will increase functional independence with mobility by performin. Supine to sit with Moderate Assistance  2. Sit to supine with Moderate Assistance  3. Sit to stand transfer with Moderate Assistance  4. Bed to chair transfer with Moderate Assistance  using Rolling Walker  5. Gait  x 10 feet with Moderate Assistance using Rolling Walker.   6. Sitting at edge of bed x10 minutes with Minimal Assistance  7. Lower extremity exercise program x30 reps per handout, with assistance as needed  8. Propel wheelchair 40 feet using Bilateral upper extremities and minimal assistance                         History:     Past Medical History:   Diagnosis Date    Acute on chronic pancreatitis 04/03/2016    Bacterial pneumonia 02/29/2020    Cavitary lung lesions  Parapneumonic effusion/ EMPYEMA    CHF (congestive heart failure)     Closed compression fracture of body of L1 vertebra 05/09/2020    COPD (chronic obstructive pulmonary disease) 08/08/2020    DM (diabetes mellitus), type 1     c/b DKA    HCV (hepatitis C virus)     08/2023 count < 12    HTN (hypertension)     Hx of psychiatric care     Stroke     R sided deficits    Substance abuse     IVDU heroin, methamphetamines       Past Surgical History:   Procedure Laterality Date    CARDIAC SURGERY  1999    stent placed. ([facility])    ESOPHAGOGASTRODUODENOSCOPY N/A 3/5/2020    Gastritis.  No H pylori.  Completed PPI for 1 month.  Procedure: EGD (ESOPHAGOGASTRODUODENOSCOPY);  Surgeon: Brinda Rene MD;  Location: [facility] ENDO;  Service: Endoscopy;  Laterality: N/A;  [id number]; [phone]    LEFT HEART CATHETERIZATION Left 9/28/2020    Procedure: Left heart cath;  Surgeon: Fito Rangel MD;  Location: [facility] CATH LAB;  Service: Cardiology;  Laterality: Left;    SHOULDER SURGERY  2013    right shoulder, spur removal.       Time Tracking:     PT Received On: 03/12/24  PT Start Time: 0940     PT Stop Time: 0953  PT Total Time (min): 13 min     Billable Minutes: Evaluation 13      03/12/2024

## 2024-03-12 NOTE — PROGRESS NOTES
"CONSULTATION LIAISON PSYCHIATRY PROGRESS NOTE    Patient Name: Cali Mabry  MRN: [id number]  Patient Class: IP- Inpatient  Admission Date: 3/4/2024  Attending Physician: Siddhartha Zavala MD      SUBJECTIVE:   Cali Mabry is a 58 y.o. 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 UDS negative, Carbamazepine level undetectable. PEC placed in ED due to combative behavior and confusion. Patient was found to be COVID+ and have UTI. He was admitted to the hospital for Acute encephalopathy. Patient received Zyprexa 5 mg IM on 3/5 @ 1539.     Psychiatry consulted for "patient with history of Bipolar and substance use disorder, currently PEC'd. Consult for psych evaluation and need for placement in psych facility?"     No behavioral PRNs required. Received PM Zydis dose. /88 this morning.     Today, patient seen at beside with nursing present. Patient is awake but drowsy appearing. Eyes are closed for the majority of the interview attempt. Patient observed following some commands but overall does not follow commands. Patient observed eating soft food with help of nursing. Patient observed with wet sounding cough but he does not respond to questions about sputum or cough. Patient does not verbally interact with nursing or interviewer with the exception of stating "get the f*ck away from me" or "don't touch me." He does not respond to questions and does not make eye contact with the interviewer when his eyes are open.    OBJECTIVE:    Mental Status Exam:  General Appearance: dressed in hospital garb, lying in bed, thin and gaunt  Behavior: minimal responses, uncooperative, agitated, poor eye contact  Involuntary Movements and Motor Activity: no " abnormal involuntary movements noted  Gait and Station: unable to assess - patient lying down or seated  Speech and Language: loud, profane, responds to questions minimally/briefly  Mood: unable to assess  Affect: dysphoric  Thought Process and Associations: difficult to assess due to poverty of thought  Thought Content and Perceptions:: Unable to Assess  Sensorium and Orientation: oriented to person only  Recent and Remote Memory: Unable to  Formally Assess  Attention and Concentration: Unable to Formally Assess  Fund of Knowledge: Unable to Formally Assess  Insight: limited/partial awareness of illness  Judgment: impaired due to delirium, behavior is inappropriate to the circumstances, noncompliant with health provider's recommendations and instructions, improving     CAM ICU positive? Unable to assess    AIMS: Score 3/36  Abnormal Involuntary Movement Scale  0-4   Muscles of Facial Expression  0   Lips and Perioral Area  0   Jaw  0   Tongue  0   Upper (arms, wrists, hands, fingers)  0    Lower (legs, knees, ankles, toes)  0   Neck, shoulders, hips  0   Severity of abnormal movements (highest score from questions above)  0    Incapacitation due to abnormal movements  0    Patient's awareness of abnormal movements (rate only patient's report)  0   Current problems with teeth and/or dentures?  No    Does patient usually wear dentures?  No           ASSESSMENT & RECOMMENDATIONS   Acute Encephalopathy              R/O NPH  Intermittent Explosive Disorder  Hx TBI  Hx CVA  Hx Unspecified Neurocognitive Disorder  Hx Bipolar Disorder  Hx MDD        Intermittent Explosive Disorder  Hx Bipolar Disorder  Hx MDD  PSYCH MEDICATIONS  Scheduled -  Continue to HOLD Risperdal 1 mg PO BID at this time  Ok to HOLD  Zyprexa (Zydis) 5 mg PO nightly at this time  Increase Tegretol to home dose 200 mg daily and 400 mg nightly  Continue Effexor XR 75 mg PO daily  PRN  Continue Zyprexa 5 mg PO or IM for non-redirectable agitation  associated with breakthrough psychosis or vero     DELIRIUM  Recommended Neurology Consult for NPH R/O   Given acute mental status change, gait instability per collateral ~2-3 months, possible urinary incontinence and CT Head results from 3/4 with ventriculomegaly out of proportion to the degree of sulcal enlargement  Per Neuro, no interventions or investigations to be initiated in the inpatient setting.  DELIRIUM BEHAVIOR MANAGEMENT  PLEASE utilize CHEMICAL restraints with PRN meds first for agitation. Minimize use of PHYSICAL restraints OR have periods of being out of physical restraints if possible.  Keep window shades open and room lit during day and room dim at night in order to promote normal sleep-wake cycles  Encourage family at bedside. Buna patient often to situation, location, date.  Continue to Limit or Discontinue use of Narcotics, Benzos and Anti-cholinergic medications as they may worsen delirium.  Continue medical workup for causative etiology of Delirium.      RISK ASSESSMENT  NEEDS CEC because patient is in imminent danger of hurting self or others and is gravely disabled. & NEEDS 1:1 sitter     FOLLOW UP  Will follow up while in house     DISPOSITION - once medically cleared:   Will reassess daily    Please contact ON CALL psychiatry service (24/7) for any acute issues that may arise.    Dr. Martha Storey MD  [facility] PGY-II    Psychiatry  [facility]  3/12/2024 1:05 PM        --------------------------------------------------------------------------------------------------------------------------------------------------------------------------------------------------------------------------------------    CONTINUED OBJECTIVE clinical data & findings reviewed and noted for above decision making    Current Medications:   Scheduled Meds:    aspirin  81 mg Oral Daily    atorvastatin  20 mg Oral Daily    carBAMazepine  400 mg Oral Daily    enoxparin  40 mg  Subcutaneous Daily    folic acid  1 mg Oral Daily    insulin aspart U-100  5 Units Subcutaneous TIDWM    insulin detemir U-100 (Levemir)  8 Units Subcutaneous BID    losartan  100 mg Oral Daily    melatonin  6 mg Oral Nightly    multivitamin  1 tablet Oral Daily    NIFEdipine  60 mg Oral Daily    OLANZapine zydis  5 mg Oral QHS    polyethylene glycol  17 g Oral Q6H WAKE    senna-docusate 8.6-50 mg  1 tablet Oral BID    thiamine  100 mg Oral Daily    venlafaxine  75 mg Oral Daily     PRN Meds: albuterol, benzonatate, dextromethorphan-guaiFENesin  mg/5 ml, dextrose 10%, dextrose 10%, glucagon (human recombinant), glucose, glucose, insulin aspart U-100, labetalol, naloxone, ondansetron, sodium chloride 0.9%    Allergies:   Review of patient's allergies indicates:  No Known Allergies    Vitals  Vitals:    03/12/24 0747   BP: (!) 186/88   Pulse: 79   Resp: 20   Temp: 97.6 °F (36.4 °C)       Labs/Imaging/Studies:  Recent Results (from the past 24 hour(s))   POCT glucose    Collection Time: 03/11/24  1:23 PM   Result Value Ref Range    POCT Glucose 251 (H) 70 - 110 mg/dL   POCT glucose    Collection Time: 03/11/24  3:33 PM   Result Value Ref Range    POCT Glucose 210 (H) 70 - 110 mg/dL   POCT glucose    Collection Time: 03/11/24  5:00 PM   Result Value Ref Range    POCT Glucose 127 (H) 70 - 110 mg/dL   POCT glucose    Collection Time: 03/11/24  8:56 PM   Result Value Ref Range    POCT Glucose 84 70 - 110 mg/dL   Basic Metabolic Panel    Collection Time: 03/12/24  5:19 AM   Result Value Ref Range    Sodium 139 136 - 145 mmol/L    Potassium 4.1 3.5 - 5.1 mmol/L    Chloride 106 95 - 110 mmol/L    CO2 27 23 - 29 mmol/L    Glucose 69 (L) 70 - 110 mg/dL    BUN 26 (H) 6 - 20 mg/dL    Creatinine 1.0 0.5 - 1.4 mg/dL    Calcium 9.1 8.7 - 10.5 mg/dL    Anion Gap 6 (L) 8 - 16 mmol/L    eGFR >60.0 >60 mL/min/1.73 m^2   POCT glucose    Collection Time: 03/12/24  6:52 AM   Result Value Ref Range    POCT Glucose 187 (H) 70 - 110  mg/dL     Imaging Results              CT Head Without Contrast (Final result)  Result time 03/04/24 16:55:45      Final result by Jered Cheatham MD (03/04/24 16:55:45)                   Impression:      No evidence of acute hemorrhage or major vascular distribution infarct.    Chronic small vessel ischemic change with remote right occipital and left cerebellar infarcts.    Ventriculomegaly out of proportion to the degree of sulcal enlargement, similar to prior exams.  While this could relate to cerebral volume loss, the configuration does raise the possibility of normal pressure hydrocephalus. Clinical correlation required.      Electronically signed by: Jered Cheatham MD  Date:    03/04/2024  Time:    16:55               Narrative:    EXAMINATION:  CT HEAD WITHOUT CONTRAST    CLINICAL HISTORY:  Mental status change, unknown cause;    TECHNIQUE:  Low dose axial CT images obtained throughout the head without the use of intravenous contrast.  Axial, sagittal and coronal reconstructions were performed.    COMPARISON:  12/10/2023.    FINDINGS:  Intracranial compartment:    Ventricles are enlarged, similar to prior.  Third ventricle diameter again measuring up to 1.4 cm.  Ventricular enlargement out of proportion of degree of sulcal enlargement.    Chronic small vessel ischemic change in the supratentorial white matter.  Remote right occipital infarct.  Remote left cerebellar infarct.  No acute major vascular distribution infarct.  No acute parenchymal hemorrhage.  No new intracranial mass effect or midline shift.    No extra-axial blood or fluid collections.    Skull/extracranial contents (limited evaluation):    No displaced calvarial fracture.    Mastoid air cells are clear. Mild patchy mucosal thickening in the visualized paranasal sinuses.                                       CT Abdomen Pelvis With IV Contrast NO Oral Contrast (Final result)  Result time 03/04/24 18:05:52      Final result by New Falcon MD  (03/04/24 18:05:52)                   Impression:      Moderate colonic stool burden with mild wall thickening of the rectum, correlate with signs of constipation or proctitis.    Diffuse bladder wall thickening, correlate with urinalysis and symptoms of cystitis.    Diverticulosis coli without evidence of acute diverticulitis    Stable 5 mm right middle lobe solid pulmonary micronodule.    Additional findings as above.    Electronically signed by resident: Marsha Cardona  Date:    03/04/2024  Time:    17:27    Electronically signed by: New Falcon MD  Date:    03/04/2024  Time:    18:05               Narrative:    EXAMINATION:  CT ABDOMEN PELVIS WITH IV CONTRAST    CLINICAL HISTORY:  Abdominal abscess/infection suspected;    TECHNIQUE:  Axial images of the abdomen and pelvis were acquired after administration of 75 cc Omnipaque 350 IV contrast. No oral contrast was administered.  Coronal and sagittal reconstructions were also obtained.    COMPARISON:  CT 12/12/2023 and 07/26/2023.    FINDINGS:  Examination degraded by patient motion artifact.    Heart: Normal heart size.  No pericardial fluid.  No significant calcific coronary atherosclerosis.    Lungs: Evaluation of the lung bases limited due to respiratory motion.  Allowing for this, stable linear bandlike opacities of the right lung base suggestive of atelectasis/scarring.  Stable 5 mm right middle lobe solid pulmonary micronodule (2-7).  No new focal consolidation.  No pleural effusion or pneumothorax.    Liver: Normal in size and contour.  No focal hepatic lesion.    Gallbladder: No calcified gallstones.    Bile Ducts: No biliary ductal dilatation.    Pancreas: No mass or peripancreatic fat stranding.    Spleen: Spleen is upper limits of normal in size measuring 12 cm.    Adrenals: No significant abnormalities.    Kidneys/Ureters: Normal in size and location. Normal enhancement. No hydronephrosis or nephrolithiasis. No ureteral dilatation.    Bladder: Diffuse  bladder wall thickening.    Reproductive organs: Prostate is unremarkable.    Peritoneum: No free air or free fluid.    Retroperitoneum: No pathologically enlarged retroperitoneal lymph nodes.    Bowel/Mesentery: Small hiatal hernia.  Stomach is otherwise unremarkable.  Small bowel is normal in caliber without evidence of inflammation or obstruction.  Moderate colonic stool burden.  Mild wall thickening of the rectum with large fecal burden, component of proctitis to be considered.  Scattered colonic diverticuli.  Appendix not definitively visualized.  No steve-cecal soft tissue stranding.    Abdominal wall:  Small fat containing umbilical hernia. 4.7 x 1.8 cm fat containing lesion along the left anterolateral chest wall, favored to represent a lipoma.    Vasculature: Mild calcific atherosclerosis of the abdominal aorta and its branches.  No aneurysm.    Bones: Mild degenerative changes spine.  Prominent Schmorl's node at L1, unchanged.  Healing left anterolateral rib fractures.  No acute fractures.  Lucent lesion within the right femoral neck, stable dating back to multiple priors.                                       X-Ray Chest AP Portable (Final result)  Result time 03/04/24 13:37:35      Final result by Cullen Bush MD (03/04/24 13:37:35)                   Impression:      See above      Electronically signed by: Cullen Bush MD  Date:    03/04/2024  Time:    13:37               Narrative:    EXAMINATION:  XR CHEST AP PORTABLE    CLINICAL HISTORY:  Sepsis;    TECHNIQUE:  Single frontal view of the chest was performed.    COMPARISON:  None 12/22/2023    FINDINGS:  Heart size normal.  The lungs are clear.  No pleural effusion

## 2024-03-12 NOTE — PLAN OF CARE
PT eval completed- see note for details, goals and POC established.     Problem: Physical Therapy  Goal: Physical Therapy Goal  Description: Goals to be met by: 2024     Patient will increase functional independence with mobility by performin. Supine to sit with Moderate Assistance  2. Sit to supine with Moderate Assistance  3. Sit to stand transfer with Moderate Assistance  4. Bed to chair transfer with Moderate Assistance using Rolling Walker  5. Gait  x 10 feet with Moderate Assistance using Rolling Walker.   6. Sitting at edge of bed x10 minutes with Minimal Assistance  7. Lower extremity exercise program x30 reps per handout, with assistance as needed  8. Propel wheelchair 40 feet using Bilateral upper extremities and minimal assistance    Outcome: Ongoing, Progressing   3/12/2024

## 2024-03-12 NOTE — SUBJECTIVE & OBJECTIVE
Interval History: somnolent on examination. Modified zyprexa for agitation only. Optimized tegretol management.     Review of Systems   Unable to perform ROS: Psychiatric disorder     Objective:     Vital Signs (Most Recent):  Temp: 97.1 °F (36.2 °C) (03/12/24 1055)  Pulse: 87 (03/12/24 1055)  Resp: 18 (03/12/24 1055)  BP: 120/73 (03/12/24 1055)  SpO2: (!) 90 % (03/12/24 1055) Vital Signs (24h Range):  Temp:  [97.1 °F (36.2 °C)-97.9 °F (36.6 °C)] 97.1 °F (36.2 °C)  Pulse:  [77-99] 87  Resp:  [16-20] 18  SpO2:  [90 %-97 %] 90 %  BP: (111-186)/(66-95) 120/73     Weight: 63.5 kg (140 lb)  Body mass index is 18.99 kg/m².    Intake/Output Summary (Last 24 hours) at 3/12/2024 1331  Last data filed at 3/12/2024 0408  Gross per 24 hour   Intake 180 ml   Output 1050 ml   Net -870 ml         Physical Exam  Constitutional:       Comments: Disheveled    HENT:      Head: Normocephalic.   Eyes:      General: No scleral icterus.     Extraocular Movements: Extraocular movements intact.   Cardiovascular:      Rate and Rhythm: Normal rate and regular rhythm.   Pulmonary:      Effort: No respiratory distress.      Breath sounds: Normal breath sounds. No wheezing or rales.   Abdominal:      General: There is no distension.      Palpations: Abdomen is soft.   Musculoskeletal:      Cervical back: Normal range of motion.   Skin:     General: Skin is warm and dry.      Findings: No rash.   Neurological:      Mental Status: He is alert.   Psychiatric:         Mood and Affect: Affect is labile.         Speech: Speech is delayed.         Behavior: Behavior is slowed.         Cognition and Memory: Cognition is impaired.      Comments: Oriented to person and place.             Significant Labs: All pertinent labs within the past 24 hours have been reviewed.    Significant Imaging: I have reviewed all pertinent imaging results/findings within the past 24 hours.

## 2024-03-12 NOTE — ASSESSMENT & PLAN NOTE
Chronic, controlled. Latest blood pressure and vitals reviewed-     Temp:  [97.1 °F (36.2 °C)-97.9 °F (36.6 °C)]   Pulse:  [77-99]   Resp:  [16-20]   BP: (111-186)/(66-95)   SpO2:  [90 %-97 %] .   Home meds for hypertension were reviewed and noted below.   Hypertension Medications               losartan (COZAAR) 50 MG tablet Take 1 tablet (50 mg total) by mouth once daily.    metoprolol succinate (TOPROL-XL) 25 MG 24 hr tablet Take 1 tablet (25 mg total) by mouth once daily.          - on losartan 50 and nifedipine 60 as IP   - PRN labetalol

## 2024-03-13 LAB
ALBUMIN SERPL BCP-MCNC: 3.2 G/DL (ref 3.5–5.2)
ALP SERPL-CCNC: 108 U/L (ref 55–135)
ALT SERPL W/O P-5'-P-CCNC: 28 U/L (ref 10–44)
AMMONIA PLAS-SCNC: 35 UMOL/L (ref 10–50)
ANION GAP SERPL CALC-SCNC: 7 MMOL/L (ref 8–16)
AST SERPL-CCNC: 23 U/L (ref 10–40)
BASOPHILS # BLD AUTO: 0.03 K/UL (ref 0–0.2)
BASOPHILS NFR BLD: 0.4 % (ref 0–1.9)
BILIRUB DIRECT SERPL-MCNC: 0.1 MG/DL (ref 0.1–0.3)
BILIRUB SERPL-MCNC: 0.2 MG/DL (ref 0.1–1)
BUN SERPL-MCNC: 26 MG/DL (ref 6–20)
CALCIUM SERPL-MCNC: 9.1 MG/DL (ref 8.7–10.5)
CHLORIDE SERPL-SCNC: 105 MMOL/L (ref 95–110)
CO2 SERPL-SCNC: 26 MMOL/L (ref 23–29)
CREAT SERPL-MCNC: 1.4 MG/DL (ref 0.5–1.4)
DIFFERENTIAL METHOD BLD: ABNORMAL
EOSINOPHIL # BLD AUTO: 0.2 K/UL (ref 0–0.5)
EOSINOPHIL NFR BLD: 3.4 % (ref 0–8)
ERYTHROCYTE [DISTWIDTH] IN BLOOD BY AUTOMATED COUNT: 14.3 % (ref 11.5–14.5)
EST. GFR  (NO RACE VARIABLE): 58.6 ML/MIN/1.73 M^2
GLUCOSE SERPL-MCNC: 134 MG/DL (ref 70–110)
HCT VFR BLD AUTO: 41.3 % (ref 40–54)
HGB BLD-MCNC: 12.6 G/DL (ref 14–18)
IMM GRANULOCYTES # BLD AUTO: 0.06 K/UL (ref 0–0.04)
IMM GRANULOCYTES NFR BLD AUTO: 0.8 % (ref 0–0.5)
INFLUENZA A, MOLECULAR: NOT DETECTED
INFLUENZA B, MOLECULAR: NOT DETECTED
LYMPHOCYTES # BLD AUTO: 1.4 K/UL (ref 1–4.8)
LYMPHOCYTES NFR BLD: 20.2 % (ref 18–48)
MCH RBC QN AUTO: 25.1 PG (ref 27–31)
MCHC RBC AUTO-ENTMCNC: 30.5 G/DL (ref 32–36)
MCV RBC AUTO: 82 FL (ref 82–98)
MONOCYTES # BLD AUTO: 0.7 K/UL (ref 0.3–1)
MONOCYTES NFR BLD: 10.2 % (ref 4–15)
NEUTROPHILS # BLD AUTO: 4.6 K/UL (ref 1.8–7.7)
NEUTROPHILS NFR BLD: 65 % (ref 38–73)
NRBC BLD-RTO: 0 /100 WBC
PLATELET # BLD AUTO: 335 K/UL (ref 150–450)
PMV BLD AUTO: 9.6 FL (ref 9.2–12.9)
POCT GLUCOSE: 118 MG/DL (ref 70–110)
POCT GLUCOSE: 124 MG/DL (ref 70–110)
POCT GLUCOSE: 192 MG/DL (ref 70–110)
POCT GLUCOSE: 238 MG/DL (ref 70–110)
POTASSIUM SERPL-SCNC: 4.4 MMOL/L (ref 3.5–5.1)
PROT SERPL-MCNC: 7.5 G/DL (ref 6–8.4)
RBC # BLD AUTO: 5.01 M/UL (ref 4.6–6.2)
RSV AG BY MOLECULAR METHOD: NOT DETECTED
SARS-COV-2 RNA RESP QL NAA+PROBE: DETECTED
SODIUM SERPL-SCNC: 138 MMOL/L (ref 136–145)
T4 FREE SERPL-MCNC: 0.91 NG/DL (ref 0.71–1.51)
TSH SERPL DL<=0.005 MIU/L-ACNC: 0.36 UIU/ML (ref 0.4–4)
VIT B12 SERPL-MCNC: 360 PG/ML (ref 210–950)
WBC # BLD AUTO: 7.08 K/UL (ref 3.9–12.7)

## 2024-03-13 PROCEDURE — 21400001 HC TELEMETRY ROOM

## 2024-03-13 PROCEDURE — 86592 SYPHILIS TEST NON-TREP QUAL: CPT

## 2024-03-13 PROCEDURE — 0241U SARS-COV2 (COVID) WITH FLU/RSV BY PCR: CPT

## 2024-03-13 PROCEDURE — 80048 BASIC METABOLIC PNL TOTAL CA: CPT | Performed by: STUDENT IN AN ORGANIZED HEALTH CARE EDUCATION/TRAINING PROGRAM

## 2024-03-13 PROCEDURE — 25000003 PHARM REV CODE 250: Performed by: STUDENT IN AN ORGANIZED HEALTH CARE EDUCATION/TRAINING PROGRAM

## 2024-03-13 PROCEDURE — 27000207 HC ISOLATION

## 2024-03-13 PROCEDURE — 36415 COLL VENOUS BLD VENIPUNCTURE: CPT | Mod: XB | Performed by: STUDENT IN AN ORGANIZED HEALTH CARE EDUCATION/TRAINING PROGRAM

## 2024-03-13 PROCEDURE — 25000003 PHARM REV CODE 250: Performed by: HOSPITALIST

## 2024-03-13 PROCEDURE — 63600175 PHARM REV CODE 636 W HCPCS

## 2024-03-13 PROCEDURE — 92610 EVALUATE SWALLOWING FUNCTION: CPT

## 2024-03-13 PROCEDURE — 97535 SELF CARE MNGMENT TRAINING: CPT

## 2024-03-13 PROCEDURE — 85025 COMPLETE CBC W/AUTO DIFF WBC: CPT

## 2024-03-13 PROCEDURE — 82140 ASSAY OF AMMONIA: CPT

## 2024-03-13 PROCEDURE — 84443 ASSAY THYROID STIM HORMONE: CPT

## 2024-03-13 PROCEDURE — 84439 ASSAY OF FREE THYROXINE: CPT

## 2024-03-13 PROCEDURE — 36415 COLL VENOUS BLD VENIPUNCTURE: CPT

## 2024-03-13 PROCEDURE — 80076 HEPATIC FUNCTION PANEL: CPT | Performed by: PSYCHIATRY & NEUROLOGY

## 2024-03-13 PROCEDURE — 82607 VITAMIN B-12: CPT

## 2024-03-13 PROCEDURE — 25000003 PHARM REV CODE 250

## 2024-03-13 RX ORDER — AMOXICILLIN 250 MG
2 CAPSULE ORAL 2 TIMES DAILY
Status: DISCONTINUED | OUTPATIENT
Start: 2024-03-13 | End: 2024-03-19 | Stop reason: HOSPADM

## 2024-03-13 RX ADMIN — INSULIN ASPART 2 UNITS: 100 INJECTION, SOLUTION INTRAVENOUS; SUBCUTANEOUS at 12:03

## 2024-03-13 RX ADMIN — FOLIC ACID 1 MG: 1 TABLET ORAL at 09:03

## 2024-03-13 RX ADMIN — INSULIN ASPART 5 UNITS: 100 INJECTION, SOLUTION INTRAVENOUS; SUBCUTANEOUS at 05:03

## 2024-03-13 RX ADMIN — Medication 100 MG: at 09:03

## 2024-03-13 RX ADMIN — SENNOSIDES AND DOCUSATE SODIUM 2 TABLET: 8.6; 5 TABLET ORAL at 09:03

## 2024-03-13 RX ADMIN — POLYETHYLENE GLYCOL 3350 17 G: 17 POWDER, FOR SOLUTION ORAL at 09:03

## 2024-03-13 RX ADMIN — POLYETHYLENE GLYCOL 3350 17 G: 17 POWDER, FOR SOLUTION ORAL at 02:03

## 2024-03-13 RX ADMIN — LOSARTAN POTASSIUM 100 MG: 50 TABLET, FILM COATED ORAL at 09:03

## 2024-03-13 RX ADMIN — THERA TABS 1 TABLET: TAB at 09:03

## 2024-03-13 RX ADMIN — CARBAMAZEPINE 200 MG: 200 TABLET ORAL at 09:03

## 2024-03-13 RX ADMIN — INSULIN DETEMIR 8 UNITS: 100 INJECTION, SOLUTION SUBCUTANEOUS at 09:03

## 2024-03-13 RX ADMIN — VENLAFAXINE HYDROCHLORIDE 75 MG: 75 CAPSULE, EXTENDED RELEASE ORAL at 09:03

## 2024-03-13 RX ADMIN — NIFEDIPINE 60 MG: 60 TABLET, FILM COATED, EXTENDED RELEASE ORAL at 09:03

## 2024-03-13 RX ADMIN — INSULIN ASPART 5 UNITS: 100 INJECTION, SOLUTION INTRAVENOUS; SUBCUTANEOUS at 09:03

## 2024-03-13 RX ADMIN — ASPIRIN 81 MG CHEWABLE TABLET 81 MG: 81 TABLET CHEWABLE at 09:03

## 2024-03-13 RX ADMIN — INSULIN ASPART 5 UNITS: 100 INJECTION, SOLUTION INTRAVENOUS; SUBCUTANEOUS at 12:03

## 2024-03-13 RX ADMIN — ATORVASTATIN CALCIUM 20 MG: 20 TABLET, FILM COATED ORAL at 09:03

## 2024-03-13 RX ADMIN — Medication 6 MG: at 09:03

## 2024-03-13 RX ADMIN — CARBAMAZEPINE 400 MG: 200 TABLET ORAL at 09:03

## 2024-03-13 RX ADMIN — ENOXAPARIN SODIUM 40 MG: 40 INJECTION SUBCUTANEOUS at 05:03

## 2024-03-13 NOTE — ASSESSMENT & PLAN NOTE
Likely 2/2 to drug use, but underlying COVID+ infection and possible UTI may be contributing factors. Utox negative, but reported history of recent methamphetamine use.     -- Patient improving on mental status   -- PEC'ed patient prior to admission to .   -- Capacity evaluation QD  -- PRN Zyprexa for non-redirectable agitation / violence  -- Treat underlying infection  -- See Bipolar Disorder A/P for home medications  - pending B12, TSH, RPR to continue encephalopathy workup  - Psychiatry following, at the moment not a candidate for IP psych placement given persistent delirium

## 2024-03-13 NOTE — SUBJECTIVE & OBJECTIVE
Interval History: no episodes of agitation after switching to PRN zyprexa. Finished antibiotic management. Pending new COVID screen.     Review of Systems   Unable to perform ROS: Psychiatric disorder     Objective:     Vital Signs (Most Recent):  Temp: 98.2 °F (36.8 °C) (03/13/24 0334)  Pulse: 88 (03/13/24 0334)  Resp: 18 (03/13/24 0334)  BP: (!) 156/76 (03/13/24 0334)  SpO2: (!) 93 % (03/13/24 0334) Vital Signs (24h Range):  Temp:  [97.1 °F (36.2 °C)-98.2 °F (36.8 °C)] 98.2 °F (36.8 °C)  Pulse:  [79-95] 88  Resp:  [18-20] 18  SpO2:  [80 %-97 %] 93 %  BP: (120-186)/(73-88) 156/76     Weight: 63.5 kg (140 lb)  Body mass index is 18.99 kg/m².    Intake/Output Summary (Last 24 hours) at 3/13/2024 0710  Last data filed at 3/13/2024 0600  Gross per 24 hour   Intake --   Output 200 ml   Net -200 ml         Physical Exam  Constitutional:       Comments: Disheveled    HENT:      Head: Normocephalic.   Eyes:      General: No scleral icterus.     Extraocular Movements: Extraocular movements intact.   Cardiovascular:      Rate and Rhythm: Normal rate and regular rhythm.   Pulmonary:      Effort: No respiratory distress.      Breath sounds: Normal breath sounds. No wheezing or rales.   Abdominal:      General: There is no distension.      Palpations: Abdomen is soft.   Musculoskeletal:      Cervical back: Normal range of motion.   Skin:     General: Skin is warm and dry.      Findings: No rash.   Neurological:      Mental Status: He is alert.   Psychiatric:         Mood and Affect: Affect is labile.         Speech: Speech is delayed.         Behavior: Behavior is slowed.         Cognition and Memory: Cognition is impaired.      Comments: Oriented to person and place.             Significant Labs: All pertinent labs within the past 24 hours have been reviewed.    Significant Imaging: I have reviewed all pertinent imaging results/findings within the past 24 hours.

## 2024-03-13 NOTE — PT/OT/SLP EVAL
Speech Language Pathology Evaluation  Bedside Swallow    Patient Name:  Cali Mabry   MRN:  [id number]  Admitting Diagnosis: Acute encephalopathy    Recommendations:                 General Recommendations:  Dysphagia therapy  Diet recommendations:  Minced & Moist Diet - IDDSI Level 5, Thin liquids - IDDSI Level 0   Aspiration Precautions: 1 bite/sip at a time, Alternating bites/sips, Assistance with meals, Check for pocketing/oral residue, Feed only when awake/alert, HOB to 90 degrees, Meds crushed in puree, Small bites/sips, and Strict aspiration precautions   General Precautions: Standard, aspiration, fall  Communication strategies:  go to room if call light pushed    Assessment:     Cali Mabry is a 58 y.o. male who presents with a baseline of consuming soft solids and thin liquids. SLP will follow up to ensure diet tolerance.     History:     Past Medical History:   Diagnosis Date    Acute on chronic pancreatitis 04/03/2016    Bacterial pneumonia 02/29/2020    Cavitary lung lesions  Parapneumonic effusion/ EMPYEMA    CHF (congestive heart failure)     Closed compression fracture of body of L1 vertebra 05/09/2020    COPD (chronic obstructive pulmonary disease) 08/08/2020    DM (diabetes mellitus), type 1     c/b DKA    HCV (hepatitis C virus)     08/2023 count < 12    HTN (hypertension)     Hx of psychiatric care     Stroke     R sided deficits    Substance abuse     IVDU heroin, methamphetamines       Past Surgical History:   Procedure Laterality Date    CARDIAC SURGERY  1999    stent placed. ([facility])    ESOPHAGOGASTRODUODENOSCOPY N/A 3/5/2020    Gastritis.  No H pylori.  Completed PPI for 1 month.  Procedure: EGD (ESOPHAGOGASTRODUODENOSCOPY);  Surgeon: Brinda Rene MD;  Location: Health system ENDO;  Service: Endoscopy;  Laterality: N/A;  [id number] A; [phone]    LEFT HEART CATHETERIZATION Left 9/28/2020    Procedure: Left heart cath;  Surgeon: Fito Rangel MD;  Location: Health system CATH LAB;   "Service: Cardiology;  Laterality: Left;    SHOULDER SURGERY  2013    right shoulder, spur removal.       Chest X-Rays: 3/4: "Heart size normal. The lungs are clear. No pleural effusion. "    Prior diet: Minced/moist solids with thin liquids. Patient reporting that he ate "hamburgers and hotdogs" prior to admission.      Subjective     Spoke with RN prior to entry. RN reports pt with a wet, cough with solids.     Pt awake, alert, and cooperative t/o evaluation.     Pt perseverating on "Anything else?"      Pain/Comfort:  Pain Rating 1: 0/10  Pain Rating Post-Intervention 1: 0/10    Respiratory Status: Room air    Objective:     Oral Musculature Evaluation  Oral Musculature: WFL  Dentition: edentulous  Mucosal Quality: adequate  Oral Labial Strength and Mobility: WFL  Lingual Strength and Mobility: WFL  Volitional Cough: wet with decreased strength  Volitional Swallow: Timely initation of pharyngeal swallow during palpation  Voice Prior to PO Intake: Clear vocal quality    Bedside Swallow Eval:   Consistencies Assessed:  Thin liquids via cup sips and straw  Puree    Mixed consistencies Bhavik crackers crushed in pudding      Oral Phase:   Prolonged mastication  Oral residue  Slow oral transit time    Pharyngeal Phase:   Wet, cough x1 with PO. However, patient presents with COVID. He had wet, coughing prior to and following PO trials, therefore coughing most likely due to COVID.     Compensatory Strategies  None    Treatment: Education provided re: role of SLP, diet recs, swallow precs, s/s aspiration and POC.  Pt would benefit from ongoing education regarding overall impressions. RN notified of overall impressions upon exiting the room.       Goals:   Multidisciplinary Problems       SLP Goals          Problem: SLP    Goal Priority Disciplines Outcome   SLP Goal     SLP Ongoing, Progressing   Description: Speech Language Pathology Goals  Goals expected to be met by 3/27    1. The patient will tolerate a least " restrictive diet without overt signs of aspiration.                          Plan:     Patient to be seen:  3 x/week   Plan of Care expires:  04/03/24  Plan of Care reviewed with:  patient   SLP Follow-Up:  Yes       Discharge recommendations:  No Therapy Indicated       Time Tracking:     SLP Treatment Date:   03/13/24  Speech Start Time:  1345  Speech Stop Time:  1404     Speech Total Time (min):  19 min    Billable Minutes: Eval Swallow and Oral Function 10 and Self Care/Home Management Training 8    03/13/2024

## 2024-03-13 NOTE — PLAN OF CARE
CHW attempted to contact Mother, and sister in patient contacts regarding IMM form, message left with sister to call back about discharge appeal rights.

## 2024-03-13 NOTE — ASSESSMENT & PLAN NOTE
Patient's FSGs are uncontrolled due to hyperglycemia on current medication regimen.  Last A1c reviewed-   Lab Results   Component Value Date    HGBA1C 10.1 (H) 03/07/2024     Most recent fingerstick glucose reviewed-   Recent Labs   Lab 03/12/24  0856 03/12/24  1254 03/12/24  1654 03/12/24  2039   POCTGLUCOSE 169* 290* 272* 110       Current correctional scale  Low    Antihyperglycemics (From admission, onward)      Start     Stop Route Frequency Ordered    03/12/24 0900  insulin detemir U-100 (Levemir) pen 8 Units         -- SubQ 2 times daily 03/12/24 0814    03/11/24 1130  insulin aspart U-100 pen 5 Units         -- SubQ 3 times daily with meals 03/11/24 0921    03/07/24 1649  insulin aspart U-100 pen 0-5 Units         -- SubQ Before meals & nightly PRN 03/07/24 1550            -- Hold oral hyperglycemics  -- SSI, POCT BG qACHS; titrate basal/bolus regimen PRN to 140-180 goal  -- Diabetic diet  - Detemir 8U BID / Aspart 5U TIDWM + LDSSI   - Current A1C 10.1. Reviewed previous A1C on 11.3  - patient non-compliant with insulin regimen at home

## 2024-03-13 NOTE — PLAN OF CARE
Problem: SLP  Goal: SLP Goal  Description: Speech Language Pathology Goals  Goals expected to be met by 3/27    1. The patient will tolerate a least restrictive diet without overt signs of aspiration.      Outcome: Ongoing, Progressing       Bedside evaluation completed. Minced/moist solids recommended with thin liquids. SLP will continue to follow to ensure tolerance.    No

## 2024-03-13 NOTE — PLAN OF CARE
Problem: Infection  Goal: Absence of Infection Signs and Symptoms  Outcome: Ongoing, Not Progressing     Problem: Adult Inpatient Plan of Care  Goal: Plan of Care Review  Outcome: Ongoing, Not Progressing  Goal: Patient-Specific Goal (Individualized)  Outcome: Ongoing, Not Progressing  Goal: Absence of Hospital-Acquired Illness or Injury  Outcome: Ongoing, Not Progressing  Goal: Optimal Comfort and Wellbeing  Outcome: Ongoing, Not Progressing  Goal: Readiness for Transition of Care  Outcome: Ongoing, Not Progressing     Problem: Violence Risk or Actual  Goal: Anger and Impulse Control  Outcome: Ongoing, Not Progressing     Problem: Diabetes Comorbidity  Goal: Blood Glucose Level Within Targeted Range  Outcome: Ongoing, Not Progressing     Problem: Impaired Wound Healing  Goal: Optimal Wound Healing  Outcome: Ongoing, Not Progressing     Problem: Skin Injury Risk Increased  Goal: Skin Health and Integrity  Outcome: Ongoing, Not Progressing     Problem: Adjustment to Illness (Sepsis/Septic Shock)  Goal: Optimal Coping  Outcome: Ongoing, Not Progressing     Problem: Bleeding (Sepsis/Septic Shock)  Goal: Absence of Bleeding  Outcome: Ongoing, Not Progressing     Problem: Glycemic Control Impaired (Sepsis/Septic Shock)  Goal: Blood Glucose Level Within Desired Range  Outcome: Ongoing, Not Progressing     Problem: Infection Progression (Sepsis/Septic Shock)  Goal: Absence of Infection Signs and Symptoms  Outcome: Ongoing, Not Progressing     Problem: Nutrition Impaired (Sepsis/Septic Shock)  Goal: Optimal Nutrition Intake  Outcome: Ongoing, Not Progressing     Problem: Fall Injury Risk  Goal: Absence of Fall and Fall-Related Injury  Outcome: Ongoing, Not Progressing     Problem: Restraint, Nonbehavioral (Nonviolent)  Goal: Absence of Harm or Injury  Outcome: Ongoing, Not Progressing

## 2024-03-13 NOTE — ASSESSMENT & PLAN NOTE
CTAP wc s/o moderate stool burden, diverticula w/o infection. Negative KUB.    - optimizing bowel regimen.

## 2024-03-13 NOTE — PROGRESS NOTES
"CONSULTATION LIAISON PSYCHIATRY PROGRESS NOTE    Patient Name: Cali Mabry  MRN: [id number]  Patient Class: IP- Inpatient  Admission Date: 3/4/2024  Attending Physician: Siddhartha Zavala MD      SUBJECTIVE:   Cali Mabry is a 58 y.o. 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 UDS negative, Carbamazepine level undetectable. PEC placed in ED due to combative behavior and confusion. Patient was found to be COVID+ and have UTI. He was admitted to the hospital for Acute encephalopathy. Patient received Zyprexa 5 mg IM on 3/5 @ 1539.     Psychiatry consulted for "patient with history of Bipolar and substance use disorder, currently PEC'd. Consult for psych evaluation and need for placement in psych facility?"     No behavioral PRNs required. Received PM Zydis dose. /92 this morning.     Today, patient seen at beside awake and alert. He verbally responds to questions today but thoughts are perseverative and interview is limited. His gaze is fixed and he rarely blinks during exam and interview. Pupils are non-reactive bilaterally. He does not follow commands to track with his eyes or squeeze the interviewer's fingers.    OBJECTIVE:    Mental Status Exam:  General Appearance: dressed in hospital garb, lying in bed, thin and gaunt  Behavior: unable to participate, fixed gaze  Involuntary Movements and Motor Activity: no abnormal involuntary movements noted  Gait and Station: unable to assess - patient lying down or seated  Speech and Language: decreased spontaneity  Mood: unable to assess  Affect: flat  Thought Process and Associations: perseverative  Thought Content and Perceptions:: Unable to Assess  Sensorium and Orientation: Unable to Formally " Assess  Recent and Remote Memory: Unable to  Formally Assess  Attention and Concentration: Unable to Formally Assess  Fund of Knowledge: Unable to Formally Assess  Insight: limited/partial awareness of illness  Judgment: impaired due to delirium, behavior is inappropriate to the circumstances, noncompliant with health provider's recommendations and instructions    CAM ICU positive? Unable to assess    AIMS: Score 0/36  Abnormal Involuntary Movement Scale  0-4   Muscles of Facial Expression  0   Lips and Perioral Area  0   Jaw  0   Tongue  0   Upper (arms, wrists, hands, fingers)  0    Lower (legs, knees, ankles, toes)  0   Neck, shoulders, hips  0   Severity of abnormal movements (highest score from questions above)  0    Incapacitation due to abnormal movements  0    Patient's awareness of abnormal movements (rate only patient's report)  0   Current problems with teeth and/or dentures?  No    Does patient usually wear dentures?  No           ASSESSMENT & RECOMMENDATIONS   Acute Encephalopathy              R/O NPH  Intermittent Explosive Disorder  Hx TBI  Hx CVA  Hx Unspecified Neurocognitive Disorder  Hx Bipolar Disorder  Hx MDD        Intermittent Explosive Disorder  Hx Bipolar Disorder  Hx MDD  PSYCH MEDICATIONS  Scheduled -  Continue to HOLD Risperdal 1 mg PO BID at this time  Continue to HOLD  Zyprexa (Zydis) 5 mg PO nightly at this time  Continue Tegretol to home dose 200 mg daily and 400 mg nightly  Continue Effexor XR 75 mg PO daily  PRN  Continue Zyprexa 5 mg PO or IM for non-redirectable agitation associated with breakthrough psychosis or vero     DELIRIUM  Recommend EEG   Recommend repeat LFT's  Agree with B12, TSH, RPR  Recommended Neurology Consult for NPH R/O   Given acute mental status change, gait instability per collateral ~2-3 months, possible urinary incontinence and CT Head results from 3/4 with ventriculomegaly out of proportion to the degree of sulcal enlargement  Per Neuro, no interventions  or investigations to be initiated in the inpatient setting.  DELIRIUM BEHAVIOR MANAGEMENT  PLEASE utilize CHEMICAL restraints with PRN meds first for agitation. Minimize use of PHYSICAL restraints OR have periods of being out of physical restraints if possible.  Keep window shades open and room lit during day and room dim at night in order to promote normal sleep-wake cycles  Encourage family at bedside. [place] patient often to situation, location, date.  Continue to Limit or Discontinue use of Narcotics, Benzos and Anti-cholinergic medications as they may worsen delirium.  Continue medical workup for causative etiology of Delirium.      RISK ASSESSMENT  NEEDS CEC because patient is in imminent danger of hurting self or others and is gravely disabled. & NEEDS 1:1 sitter     FOLLOW UP  Will follow up while in house     DISPOSITION - once medically cleared:   Will reassess daily, warrants continued medical work-up at this time    Please contact ON CALL psychiatry service (24/7) for any acute issues that may arise.    Dr. Martha Storey MD  [facility] PGY-II    Psychiatry  [facility]  3/13/2024 1:05 PM        --------------------------------------------------------------------------------------------------------------------------------------------------------------------------------------------------------------------------------------    CONTINUED OBJECTIVE clinical data & findings reviewed and noted for above decision making    Current Medications:   Scheduled Meds:    aspirin  81 mg Oral Daily    atorvastatin  20 mg Oral Daily    carBAMazepine  200 mg Oral Daily    carBAMazepine  400 mg Oral QHS    enoxparin  40 mg Subcutaneous Daily    folic acid  1 mg Oral Daily    insulin aspart U-100  5 Units Subcutaneous TIDWM    insulin detemir U-100 (Levemir)  8 Units Subcutaneous BID    losartan  100 mg Oral Daily    melatonin  6 mg Oral Nightly    multivitamin  1 tablet Oral Daily     NIFEdipine  60 mg Oral Daily    polyethylene glycol  17 g Oral Q6H WAKE    senna-docusate 8.6-50 mg  2 tablet Oral BID    thiamine  100 mg Oral Daily    venlafaxine  75 mg Oral Daily     PRN Meds: albuterol, benzonatate, dextromethorphan-guaiFENesin  mg/5 ml, dextrose 10%, dextrose 10%, glucagon (human recombinant), glucose, glucose, insulin aspart U-100, labetalol, naloxone, OLANZapine, ondansetron, sodium chloride 0.9%    Allergies:   Review of patient's allergies indicates:  No Known Allergies    Vitals  Vitals:    03/13/24 0716   BP: (!) 164/92   Pulse: 88   Resp: 18   Temp: 98 °F (36.7 °C)       Labs/Imaging/Studies:  Recent Results (from the past 24 hour(s))   POCT glucose    Collection Time: 03/12/24 12:54 PM   Result Value Ref Range    POCT Glucose 290 (H) 70 - 110 mg/dL   POCT glucose    Collection Time: 03/12/24  4:54 PM   Result Value Ref Range    POCT Glucose 272 (H) 70 - 110 mg/dL   POCT glucose    Collection Time: 03/12/24  8:39 PM   Result Value Ref Range    POCT Glucose 110 70 - 110 mg/dL   Basic Metabolic Panel    Collection Time: 03/13/24  4:16 AM   Result Value Ref Range    Sodium 138 136 - 145 mmol/L    Potassium 4.4 3.5 - 5.1 mmol/L    Chloride 105 95 - 110 mmol/L    CO2 26 23 - 29 mmol/L    Glucose 134 (H) 70 - 110 mg/dL    BUN 26 (H) 6 - 20 mg/dL    Creatinine 1.4 0.5 - 1.4 mg/dL    Calcium 9.1 8.7 - 10.5 mg/dL    Anion Gap 7 (L) 8 - 16 mmol/L    eGFR 58.6 (A) >60 mL/min/1.73 m^2   POCT glucose    Collection Time: 03/13/24  7:48 AM   Result Value Ref Range    POCT Glucose 192 (H) 70 - 110 mg/dL     Imaging Results              CT Head Without Contrast (Final result)  Result time 03/04/24 16:55:45      Final result by Jered Cheatham MD (03/04/24 16:55:45)                   Impression:      No evidence of acute hemorrhage or major vascular distribution infarct.    Chronic small vessel ischemic change with remote right occipital and left cerebellar infarcts.    Ventriculomegaly out of  proportion to the degree of sulcal enlargement, similar to prior exams.  While this could relate to cerebral volume loss, the configuration does raise the possibility of normal pressure hydrocephalus. Clinical correlation required.      Electronically signed by: Jered Cheatham MD  Date:    03/04/2024  Time:    16:55               Narrative:    EXAMINATION:  CT HEAD WITHOUT CONTRAST    CLINICAL HISTORY:  Mental status change, unknown cause;    TECHNIQUE:  Low dose axial CT images obtained throughout the head without the use of intravenous contrast.  Axial, sagittal and coronal reconstructions were performed.    COMPARISON:  12/10/2023.    FINDINGS:  Intracranial compartment:    Ventricles are enlarged, similar to prior.  Third ventricle diameter again measuring up to 1.4 cm.  Ventricular enlargement out of proportion of degree of sulcal enlargement.    Chronic small vessel ischemic change in the supratentorial white matter.  Remote right occipital infarct.  Remote left cerebellar infarct.  No acute major vascular distribution infarct.  No acute parenchymal hemorrhage.  No new intracranial mass effect or midline shift.    No extra-axial blood or fluid collections.    Skull/extracranial contents (limited evaluation):    No displaced calvarial fracture.    Mastoid air cells are clear. Mild patchy mucosal thickening in the visualized paranasal sinuses.                                       CT Abdomen Pelvis With IV Contrast NO Oral Contrast (Final result)  Result time 03/04/24 18:05:52      Final result by New Falcon MD (03/04/24 18:05:52)                   Impression:      Moderate colonic stool burden with mild wall thickening of the rectum, correlate with signs of constipation or proctitis.    Diffuse bladder wall thickening, correlate with urinalysis and symptoms of cystitis.    Diverticulosis coli without evidence of acute diverticulitis    Stable 5 mm right middle lobe solid pulmonary micronodule.    Additional  findings as above.    Electronically signed by resident: Marsha Cardona  Date:    03/04/2024  Time:    17:27    Electronically signed by: New Falcon MD  Date:    03/04/2024  Time:    18:05               Narrative:    EXAMINATION:  CT ABDOMEN PELVIS WITH IV CONTRAST    CLINICAL HISTORY:  Abdominal abscess/infection suspected;    TECHNIQUE:  Axial images of the abdomen and pelvis were acquired after administration of 75 cc Omnipaque 350 IV contrast. No oral contrast was administered.  Coronal and sagittal reconstructions were also obtained.    COMPARISON:  CT 12/12/2023 and 07/26/2023.    FINDINGS:  Examination degraded by patient motion artifact.    Heart: Normal heart size.  No pericardial fluid.  No significant calcific coronary atherosclerosis.    Lungs: Evaluation of the lung bases limited due to respiratory motion.  Allowing for this, stable linear bandlike opacities of the right lung base suggestive of atelectasis/scarring.  Stable 5 mm right middle lobe solid pulmonary micronodule (2-7).  No new focal consolidation.  No pleural effusion or pneumothorax.    Liver: Normal in size and contour.  No focal hepatic lesion.    Gallbladder: No calcified gallstones.    Bile Ducts: No biliary ductal dilatation.    Pancreas: No mass or peripancreatic fat stranding.    Spleen: Spleen is upper limits of normal in size measuring 12 cm.    Adrenals: No significant abnormalities.    Kidneys/Ureters: Normal in size and location. Normal enhancement. No hydronephrosis or nephrolithiasis. No ureteral dilatation.    Bladder: Diffuse bladder wall thickening.    Reproductive organs: Prostate is unremarkable.    Peritoneum: No free air or free fluid.    Retroperitoneum: No pathologically enlarged retroperitoneal lymph nodes.    Bowel/Mesentery: Small hiatal hernia.  Stomach is otherwise unremarkable.  Small bowel is normal in caliber without evidence of inflammation or obstruction.  Moderate colonic stool burden.  Mild wall  thickening of the rectum with large fecal burden, component of proctitis to be considered.  Scattered colonic diverticuli.  Appendix not definitively visualized.  No steve-cecal soft tissue stranding.    Abdominal wall:  Small fat containing umbilical hernia. 4.7 x 1.8 cm fat containing lesion along the left anterolateral chest wall, favored to represent a lipoma.    Vasculature: Mild calcific atherosclerosis of the abdominal aorta and its branches.  No aneurysm.    Bones: Mild degenerative changes spine.  Prominent Schmorl's node at L1, unchanged.  Healing left anterolateral rib fractures.  No acute fractures.  Lucent lesion within the right femoral neck, stable dating back to multiple priors.                                       X-Ray Chest AP Portable (Final result)  Result time 03/04/24 13:37:35      Final result by Cullen Bush MD (03/04/24 13:37:35)                   Impression:      See above      Electronically signed by: Cullen Bush MD  Date:    03/04/2024  Time:    13:37               Narrative:    EXAMINATION:  XR CHEST AP PORTABLE    CLINICAL HISTORY:  Sepsis;    TECHNIQUE:  Single frontal view of the chest was performed.    COMPARISON:  None 12/22/2023    FINDINGS:  Heart size normal.  The lungs are clear.  No pleural effusion

## 2024-03-13 NOTE — PLAN OF CARE
Patient is AAOx1. Vital signs stable. No falls throughout shift. Sitter at bedside. Blood glucose monitored and insulin administered. Patient took medications crushed in pudding. Speech consulted for coughing when swallowing.  Problem: Infection  Goal: Absence of Infection Signs and Symptoms  Outcome: Ongoing, Progressing     Problem: Adult Inpatient Plan of Care  Goal: Plan of Care Review  Outcome: Ongoing, Progressing  Goal: Patient-Specific Goal (Individualized)  Outcome: Ongoing, Progressing  Goal: Absence of Hospital-Acquired Illness or Injury  Outcome: Ongoing, Progressing  Goal: Optimal Comfort and Wellbeing  Outcome: Ongoing, Progressing  Goal: Readiness for Transition of Care  Outcome: Ongoing, Progressing     Problem: Violence Risk or Actual  Goal: Anger and Impulse Control  Outcome: Ongoing, Progressing     Problem: Diabetes Comorbidity  Goal: Blood Glucose Level Within Targeted Range  Outcome: Ongoing, Progressing     Problem: Impaired Wound Healing  Goal: Optimal Wound Healing  Outcome: Ongoing, Progressing     Problem: Skin Injury Risk Increased  Goal: Skin Health and Integrity  Outcome: Ongoing, Progressing     Problem: Adjustment to Illness (Sepsis/Septic Shock)  Goal: Optimal Coping  Outcome: Ongoing, Progressing     Problem: Bleeding (Sepsis/Septic Shock)  Goal: Absence of Bleeding  Outcome: Ongoing, Progressing     Problem: Glycemic Control Impaired (Sepsis/Septic Shock)  Goal: Blood Glucose Level Within Desired Range  Outcome: Ongoing, Progressing     Problem: Infection Progression (Sepsis/Septic Shock)  Goal: Absence of Infection Signs and Symptoms  Outcome: Ongoing, Progressing     Problem: Nutrition Impaired (Sepsis/Septic Shock)  Goal: Optimal Nutrition Intake  Outcome: Ongoing, Progressing     Problem: Fall Injury Risk  Goal: Absence of Fall and Fall-Related Injury  Outcome: Ongoing, Progressing     Problem: Restraint, Nonbehavioral (Nonviolent)  Goal: Absence of Harm or Injury  Outcome:  Ongoing, Progressing

## 2024-03-13 NOTE — ASSESSMENT & PLAN NOTE
Chronic, controlled. Latest blood pressure and vitals reviewed-     Temp:  [97.1 °F (36.2 °C)-98.2 °F (36.8 °C)]   Pulse:  [79-95]   Resp:  [18-20]   BP: (120-186)/(73-88)   SpO2:  [80 %-97 %] .   Home meds for hypertension were reviewed and noted below.   Hypertension Medications               losartan (COZAAR) 50 MG tablet Take 1 tablet (50 mg total) by mouth once daily.    metoprolol succinate (TOPROL-XL) 25 MG 24 hr tablet Take 1 tablet (25 mg total) by mouth once daily.          - on losartan 50 and nifedipine 60 as IP   - PRN labetalol

## 2024-03-14 ENCOUNTER — DOCUMENTATION ONLY (OUTPATIENT)
Dept: NEUROLOGY | Facility: CLINIC | Age: 59
End: 2024-03-14

## 2024-03-14 PROBLEM — R56.9 SEIZURE: Status: ACTIVE | Noted: 2024-03-14

## 2024-03-14 LAB
ALBUMIN SERPL BCP-MCNC: 3.3 G/DL (ref 3.5–5.2)
ALP SERPL-CCNC: 114 U/L (ref 55–135)
ALT SERPL W/O P-5'-P-CCNC: 28 U/L (ref 10–44)
ANION GAP SERPL CALC-SCNC: 9 MMOL/L (ref 8–16)
ANION GAP SERPL CALC-SCNC: 9 MMOL/L (ref 8–16)
AST SERPL-CCNC: 23 U/L (ref 10–40)
BILIRUB SERPL-MCNC: 0.3 MG/DL (ref 0.1–1)
BUN SERPL-MCNC: 20 MG/DL (ref 6–20)
BUN SERPL-MCNC: 20 MG/DL (ref 6–20)
CALCIUM SERPL-MCNC: 9.7 MG/DL (ref 8.7–10.5)
CALCIUM SERPL-MCNC: 9.7 MG/DL (ref 8.7–10.5)
CHLORIDE SERPL-SCNC: 102 MMOL/L (ref 95–110)
CHLORIDE SERPL-SCNC: 102 MMOL/L (ref 95–110)
CO2 SERPL-SCNC: 23 MMOL/L (ref 23–29)
CO2 SERPL-SCNC: 23 MMOL/L (ref 23–29)
CREAT SERPL-MCNC: 1.3 MG/DL (ref 0.5–1.4)
CREAT SERPL-MCNC: 1.3 MG/DL (ref 0.5–1.4)
EST. GFR  (NO RACE VARIABLE): >60 ML/MIN/1.73 M^2
EST. GFR  (NO RACE VARIABLE): >60 ML/MIN/1.73 M^2
GLUCOSE SERPL-MCNC: 148 MG/DL (ref 70–110)
GLUCOSE SERPL-MCNC: 148 MG/DL (ref 70–110)
POCT GLUCOSE: 127 MG/DL (ref 70–110)
POCT GLUCOSE: 129 MG/DL (ref 70–110)
POCT GLUCOSE: 168 MG/DL (ref 70–110)
POCT GLUCOSE: 98 MG/DL (ref 70–110)
POTASSIUM SERPL-SCNC: 4.3 MMOL/L (ref 3.5–5.1)
POTASSIUM SERPL-SCNC: 4.3 MMOL/L (ref 3.5–5.1)
PROT SERPL-MCNC: 7.9 G/DL (ref 6–8.4)
RPR SER QL: NORMAL
SODIUM SERPL-SCNC: 134 MMOL/L (ref 136–145)
SODIUM SERPL-SCNC: 134 MMOL/L (ref 136–145)
T4 FREE SERPL-MCNC: 0.94 NG/DL (ref 0.71–1.51)

## 2024-03-14 PROCEDURE — 97165 OT EVAL LOW COMPLEX 30 MIN: CPT

## 2024-03-14 PROCEDURE — 21400001 HC TELEMETRY ROOM

## 2024-03-14 PROCEDURE — 36415 COLL VENOUS BLD VENIPUNCTURE: CPT

## 2024-03-14 PROCEDURE — 63600175 PHARM REV CODE 636 W HCPCS

## 2024-03-14 PROCEDURE — 25000003 PHARM REV CODE 250: Performed by: HOSPITALIST

## 2024-03-14 PROCEDURE — 92526 ORAL FUNCTION THERAPY: CPT

## 2024-03-14 PROCEDURE — 95700 EEG CONT REC W/VID EEG TECH: CPT

## 2024-03-14 PROCEDURE — 25000003 PHARM REV CODE 250: Performed by: STUDENT IN AN ORGANIZED HEALTH CARE EDUCATION/TRAINING PROGRAM

## 2024-03-14 PROCEDURE — 95720 EEG PHY/QHP EA INCR W/VEEG: CPT | Mod: ,,, | Performed by: PSYCHIATRY & NEUROLOGY

## 2024-03-14 PROCEDURE — 25000003 PHARM REV CODE 250

## 2024-03-14 PROCEDURE — 80053 COMPREHEN METABOLIC PANEL: CPT

## 2024-03-14 PROCEDURE — 97112 NEUROMUSCULAR REEDUCATION: CPT

## 2024-03-14 PROCEDURE — 94761 N-INVAS EAR/PLS OXIMETRY MLT: CPT

## 2024-03-14 PROCEDURE — 95714 VEEG EA 12-26 HR UNMNTR: CPT

## 2024-03-14 PROCEDURE — 84439 ASSAY OF FREE THYROXINE: CPT

## 2024-03-14 PROCEDURE — 27000207 HC ISOLATION

## 2024-03-14 RX ADMIN — ATORVASTATIN CALCIUM 20 MG: 20 TABLET, FILM COATED ORAL at 08:03

## 2024-03-14 RX ADMIN — CARBAMAZEPINE 400 MG: 200 TABLET ORAL at 10:03

## 2024-03-14 RX ADMIN — INSULIN ASPART 5 UNITS: 100 INJECTION, SOLUTION INTRAVENOUS; SUBCUTANEOUS at 11:03

## 2024-03-14 RX ADMIN — Medication 6 MG: at 10:03

## 2024-03-14 RX ADMIN — Medication 100 MG: at 08:03

## 2024-03-14 RX ADMIN — SENNOSIDES AND DOCUSATE SODIUM 2 TABLET: 8.6; 5 TABLET ORAL at 08:03

## 2024-03-14 RX ADMIN — ASPIRIN 81 MG CHEWABLE TABLET 81 MG: 81 TABLET CHEWABLE at 08:03

## 2024-03-14 RX ADMIN — INSULIN ASPART 5 UNITS: 100 INJECTION, SOLUTION INTRAVENOUS; SUBCUTANEOUS at 04:03

## 2024-03-14 RX ADMIN — LOSARTAN POTASSIUM 100 MG: 50 TABLET, FILM COATED ORAL at 08:03

## 2024-03-14 RX ADMIN — POLYETHYLENE GLYCOL 3350 17 G: 17 POWDER, FOR SOLUTION ORAL at 08:03

## 2024-03-14 RX ADMIN — FOLIC ACID 1 MG: 1 TABLET ORAL at 08:03

## 2024-03-14 RX ADMIN — NIFEDIPINE 60 MG: 60 TABLET, FILM COATED, EXTENDED RELEASE ORAL at 08:03

## 2024-03-14 RX ADMIN — ENOXAPARIN SODIUM 40 MG: 40 INJECTION SUBCUTANEOUS at 04:03

## 2024-03-14 RX ADMIN — VENLAFAXINE HYDROCHLORIDE 75 MG: 75 CAPSULE, EXTENDED RELEASE ORAL at 08:03

## 2024-03-14 RX ADMIN — THERA TABS 1 TABLET: TAB at 08:03

## 2024-03-14 RX ADMIN — SENNOSIDES AND DOCUSATE SODIUM 2 TABLET: 8.6; 5 TABLET ORAL at 10:03

## 2024-03-14 RX ADMIN — POLYETHYLENE GLYCOL 3350 17 G: 17 POWDER, FOR SOLUTION ORAL at 10:03

## 2024-03-14 RX ADMIN — INSULIN DETEMIR 8 UNITS: 100 INJECTION, SOLUTION SUBCUTANEOUS at 10:03

## 2024-03-14 RX ADMIN — POLYETHYLENE GLYCOL 3350 17 G: 17 POWDER, FOR SOLUTION ORAL at 02:03

## 2024-03-14 RX ADMIN — CARBAMAZEPINE 200 MG: 200 TABLET ORAL at 08:03

## 2024-03-14 NOTE — PROGRESS NOTES
[facility] - Med Surg  Adult Nutrition  Progress Note    SUMMARY       Recommendations    1. Continue 2000 Calorie diabetic/ cardiac Minced and Moist (IDDSI Level 5) Diet; texture per SLP.   Provide 1:1 assistance as needed with meals.   2. Recommend ONS Boost GC BIB to optimize nutrition.   3. Continue thiamine, folic acid and MVI supplementation.   4. Monitor wt, PO intake and labs.   5. RD to monitor.    Goals: Meet % EEN/EPN by follow-up date.  Nutrition Goal Status: new  Communication of RD Recs: other (comment) (POC)    Assessment and Plan    Nutrition Problem  Inadequate oral intake    Related to (etiology):   Decreased ability to consume sufficient energy    Signs and Symptoms (as evidenced by):   AMS     Interventions/Recommendations (treatment strategy):  Collaboration of nutrition care with other providers  ONS  1:1 assistance    Nutrition Diagnosis Status:   New         Reason for Assessment    Reason For Assessment: RD follow-up  Diagnosis:  (acute encephalopathy)  Relevant Medical History: DM, HTN, HCV, CHF, CAD, substance abuse, CVA, COPD  Interdisciplinary Rounds: did not attend  General Information Comments: RD follow-up. Patient on EEG machine during conversation. Responses were a bit delayed, AAOx 2. Pt tolerating diet, M& M 2000 calorie diabetic/cardiac. consuming 25-75% at meals. DM eduction handout provided last week but unable to education d/t patient's mental status will attempt at later date.  Nutrition Discharge Planning: Pending Clinical course    Nutrition Risk Screen    Nutrition Risk Screen: no indicators present    Nutrition/Diet History    Spiritual, Cultural Beliefs, Church Practices, Values that Affect Care: no  Factors Affecting Nutritional Intake: impaired cognitive status/motor control    Anthropometrics    Temp: 97.3 °F (36.3 °C)  Height Method: Stated  Height: 6' (182.9 cm)  Height (inches): 72 in  Weight Method: Stated  Weight: 63.5 kg (140 lb)  Weight (lb): 140  lb  Ideal Body Weight (IBW), Male: 178 lb  % Ideal Body Weight, Male (lb): 78.65 %  BMI (Calculated): 19       Lab/Procedures/Meds    Pertinent Labs Reviewed: reviewed  Pertinent Labs Comments: BUN 26, GFR 56.6, Glu 134  Pertinent Medications Reviewed: reviewed  Pertinent Medications Comments: carbamazepine, enoxaparin, folic acid, insulin, thiamine, MVI, losartan, polyethylene glycol, senna-docusate      Estimated/Assessed Needs    Weight Used For Calorie Calculations: 63.5 kg (140 lb)  Energy Calorie Requirements (kcal): 1947 kcal/d (MSJ x 1.3)  Energy Need Method: [place]-St Jeor  Protein Requirements: 64 g/d (1.0 g/kg)  Weight Used For Protein Calculations: 63.5 kg (140 lb)        RDA Method (mL): 1947  CHO Requirement: 243 g      Nutrition Prescription Ordered    Current Diet Order: 2000 Calorie diabetic/cardiac  Nutrition Order Comments: Minced and Moist (IDDSI Level 5) Diet    Evaluation of Received Nutrient/Fluid Intake    I/O: -500 mL  Comments: LBM: 3/11  Tolerance: tolerating  % Intake of Estimated Energy Needs: 75 - 100 %  % Meal Intake: 50 - 75 %    Nutrition Risk    Level of Risk/Frequency of Follow-up: low - moderate (1x/ week)     Monitor and Evaluation    Food and Nutrient Intake: energy intake, food and beverage intake  Food and Nutrient Adminstration: diet order  Physical Activity and Function: nutrition-related ADLs and IADLs, factors affecting access to physical activity  Anthropometric Measurements: weight, weight change, body mass index  Biochemical Data, Medical Tests and Procedures: electrolyte and renal panel, gastrointestinal profile, inflammatory profile, glucose/endocrine profile, lipid profile  Nutrition-Focused Physical Findings: extremities, muscles and bones, overall appearance, head and eyes, skin     Nutrition Follow-Up    RD Follow-up?: Yes    [facility]is [facility] Registration Eligible, Provisional LDN

## 2024-03-14 NOTE — PROGRESS NOTES
Patient was hook-up for EEG routine that turned into routine. Prior to set the patient skin was intact and normal.

## 2024-03-14 NOTE — PT/OT/SLP EVAL
Occupational Therapy   Evaluation/Co-treatment  Co-treatment performed due to patient's multiple deficits requiring two skilled therapists to appropriately and safely assess patient's strength and endurance while facilitating functional tasks in addition to accommodating for patient's activity tolerance.        Name: Cali Mabry  MRN: [id number]  Admitting Diagnosis: Acute encephalopathy  Recent Surgery: * No surgery found *      Recommendations:     Discharge Recommendations: Moderate Intensity Therapy  Discharge Equipment Recommendations:  walker, rolling, wheelchair, bedside commode  Barriers to discharge:  Other (Comment) (Increased skilled assistance required for ADLs & functional mobility/transfers)    Assessment:     Cali Mabry is a 58 y.o. male with a medical diagnosis of Acute encephalopathy.  He presents with the following performance deficits affecting function: weakness, impaired endurance, impaired functional mobility, gait instability, impaired balance, impaired self care skills, decreased coordination, impaired cognition, decreased upper extremity function, decreased lower extremity function, decreased safety awareness, pain. Pt agreeable to therapy but tolerated poorly. Pt is significantly limited in ADLs, functional mobility, and functional transfers and is currently not performing tasks at [facility]. Pt would continue to benefit from skilled OT services to maximize functional independence with ADLs and functional mobility, reduce caregiver burden, and facilitate safe discharge in the least restrictive environment.     Rehab Prognosis: Good; patient would benefit from acute skilled OT services to address these deficits and reach maximum level of function.       Plan:     Patient to be seen 3 x/week to address the above listed problems via self-care/home management, therapeutic exercises, therapeutic activities  Plan of Care Expires: 04/14/24  Plan of Care Reviewed with:      Subjective  "    Chief Complaint: none reported  Patient/Family Comments/goals: pt perseverating on "I am"    Occupational Profile:  Living Environment: per chart review, pt lives with niece and nephew & mother in [facility] with 0 GRAY.  Previous level of function: Unknown at this time 2/2 pt not following verbal commands/disoriented  Roles and Routines: Unknown at this time  Equipment Used at Home: cane, straight  Assistance upon Discharge: unknown at this time    Pain/Comfort:  Pain Rating 1: other (see comments) (pt did not rate)  Location - Side 1: Left  Location - Orientation 1: generalized  Location 1: arm  Pain Addressed 1: Reposition, Distraction  Pain Rating Post-Intervention 1: 0/10    Patients cultural, spiritual, Mormonism conflicts given the current situation: no    Objective:     Communicated with: RN prior to session.  Patient found HOB elevated with EEG upon OT entry to room.    General Precautions: Standard, aspiration, fall  Orthopedic Precautions: N/A  Braces: N/A  Respiratory Status: Room air    Occupational Performance:    Bed Mobility:    Patient completed Rolling to Left with  total assistance and 2 persons  Patient completed Scooting to EOB with total assistance and 2 persons  Patient completed Supine to Sit with total assistance and 2 persons  Patient completed Sit to Supine with total assistance and 2 persons  Patient completed 3 trials of sitting EOB with no hand support  Pt sat up with no support for about 3-5 seconds before demonstrating R posterolateral lean which requires min->moderate assistance to come back to center  Pt provided many verbal cues during therapy session to improve safety awareness & participation    Functional Mobility/Transfers:  Pt unable to perform secondary to significant B UE/LE weakness, disorientation, & inability to follow verbal commands    Activities of Daily Living:  Lower Body Dressing: total assistance to don R sock    Cognitive/Visual Perceptual:  Cognitive/Psychosocial " Skills:     -       Oriented to: Person   -       Follows Commands/attention:Inattentive  -       Mood/Affect/Coping skills/emotional control: Agitated and Withdrawn    Physical Exam:  Upper Extremity Range of Motion:     -       Right Upper Extremity: WFL  -       Left Upper Extremity: WFL  Upper Extremity Strength:    -       Right Upper Extremity: WFL  -       Left Upper Extremity: WFL   Strength:    -       Right Upper Extremity: WFL  -       Left Upper Extremity: WFL  Gross motor coordination:   WFL  Fine motor coordination: unable to test due to inability to follow verbal commands  AMPA 6 Click ADL:  AMPAC Total Score: 6    Treatment & Education:  -Education on task modification to maximize safety and (I) during ADLs and mobility  -Education on importance of OOB activity to improve overall activity tolerance and promote recovery  -Pt educated to call for assistance and to transfer with hospital staff only  Pt had no further questions    Patient left HOB elevated with all lines intact, call button in reach, RN notified, and EEG camera monitor present    GOALS:   Multidisciplinary Problems       Occupational Therapy Goals          Problem: Occupational Therapy    Goal Priority Disciplines Outcome Interventions   Occupational Therapy Goal     OT, PT/OT Ongoing, Progressing    Description: Goals to be met by: 04/14/2024     Patient will increase functional independence with ADLs by performing:    UE Dressing with Moderate Assistance.  LE Dressing with Moderate Assistance.  Grooming while EOB with Moderate Assistance.  Toileting from bedside commode with Maximum Assistance for hygiene and clothing management.   Rolling to Right, Left with Minimal Assistance.   Supine to sit with Minimal Assistance.  Squat pivot transfers with Moderate Assistance.                         History:     Past Medical History:   Diagnosis Date    Acute on chronic pancreatitis 04/03/2016    Bacterial pneumonia 02/29/2020    Cavitary  lung lesions  Parapneumonic effusion/ EMPYEMA    CHF (congestive heart failure)     Closed compression fracture of body of L1 vertebra 05/09/2020    COPD (chronic obstructive pulmonary disease) 08/08/2020    DM (diabetes mellitus), type 1     c/b DKA    HCV (hepatitis C virus)     08/2023 count < 12    HTN (hypertension)     Hx of psychiatric care     Stroke     R sided deficits    Substance abuse     IVDU heroin, methamphetamines         Past Surgical History:   Procedure Laterality Date    CARDIAC SURGERY  1999    stent placed. ([facility])    ESOPHAGOGASTRODUODENOSCOPY N/A 3/5/2020    Gastritis.  No H pylori.  Completed PPI for 1 month.  Procedure: EGD (ESOPHAGOGASTRODUODENOSCOPY);  Surgeon: Brinda Rene MD;  Location: [facility] ENDO;  Service: Endoscopy;  Laterality: N/A;  W421 A; [id number]    LEFT HEART CATHETERIZATION Left 9/28/2020    Procedure: Left heart cath;  Surgeon: Fito Rangel MD;  Location: [facility] CATH LAB;  Service: Cardiology;  Laterality: Left;    SHOULDER SURGERY  2013    right shoulder, spur removal.       Time Tracking:     OT Date of Treatment: 03/14/24  OT Start Time: 1303  OT Stop Time: 1325  OT Total Time (min): 22 min    Billable Minutes:Evaluation 8  Neuromuscular Re-education 14    3/14/2024

## 2024-03-14 NOTE — PLAN OF CARE
Patient is AAOx1. Vital signs stable. No falls throughout shift. Sitter at bedside. Blood glucose monitored and insulin administered. Patient took medications crushed in pudding. EEG in place.        Problem: Infection  Goal: Absence of Infection Signs and Symptoms  Outcome: Ongoing, Progressing     Problem: Adult Inpatient Plan of Care  Goal: Plan of Care Review  Outcome: Ongoing, Progressing  Goal: Patient-Specific Goal (Individualized)  Outcome: Ongoing, Progressing  Goal: Absence of Hospital-Acquired Illness or Injury  Outcome: Ongoing, Progressing  Goal: Optimal Comfort and Wellbeing  Outcome: Ongoing, Progressing  Goal: Readiness for Transition of Care  Outcome: Ongoing, Progressing     Problem: Violence Risk or Actual  Goal: Anger and Impulse Control  Outcome: Ongoing, Progressing     Problem: Diabetes Comorbidity  Goal: Blood Glucose Level Within Targeted Range  Outcome: Ongoing, Progressing     Problem: Impaired Wound Healing  Goal: Optimal Wound Healing  Outcome: Ongoing, Progressing     Problem: Skin Injury Risk Increased  Goal: Skin Health and Integrity  Outcome: Ongoing, Progressing     Problem: Adjustment to Illness (Sepsis/Septic Shock)  Goal: Optimal Coping  Outcome: Ongoing, Progressing     Problem: Bleeding (Sepsis/Septic Shock)  Goal: Absence of Bleeding  Outcome: Ongoing, Progressing     Problem: Glycemic Control Impaired (Sepsis/Septic Shock)  Goal: Blood Glucose Level Within Desired Range  Outcome: Ongoing, Progressing     Problem: Infection Progression (Sepsis/Septic Shock)  Goal: Absence of Infection Signs and Symptoms  Outcome: Ongoing, Progressing     Problem: Nutrition Impaired (Sepsis/Septic Shock)  Goal: Optimal Nutrition Intake  Outcome: Ongoing, Progressing     Problem: Fall Injury Risk  Goal: Absence of Fall and Fall-Related Injury  Outcome: Ongoing, Progressing     Problem: Restraint, Nonbehavioral (Nonviolent)  Goal: Absence of Harm or Injury  Outcome: Ongoing, Progressing

## 2024-03-14 NOTE — PT/OT/SLP PROGRESS
"Physical Therapy Co-Treatment    OT present for cotreat due to pt's multiple medical comorbidities and functional/cognition deficits requiring two skilled therapists to appropriately progress pt's musculoskeletal strength, neuromuscular control, and endurance while taking into consideration medical acuity and pt safety.    Patient Name:  Cali Mabry   MRN:  [id number]    Recommendations:     Discharge Recommendations: Moderate Intensity Therapy  Discharge Equipment Recommendations: walker, rolling, wheelchair, bedside commode  Barriers to discharge: None    Assessment:     Cali Mabry is a 58 y.o. male admitted with a medical diagnosis of Acute encephalopathy.  He presents with the following impairments/functional limitations: weakness, impaired endurance, impaired functional mobility, gait instability, impaired balance, decreased lower extremity function, decreased upper extremity function, decreased coordination, impaired self care skills     Pt demonstrated mild increase in alert and responsiveness this session demonstrated by increase in verbal response to questions and ~25% command following. Pt continues to needed total A of 2 persons to complete bed mobility. Pt sat EOB ~ 10 mins needing total A initially to maintain balance but able to progress to min A.    Rehab Prognosis: Good; patient would benefit from acute skilled PT services to address these deficits and reach maximum level of function.    Recent Surgery: * No surgery found *      Plan:     During this hospitalization, patient to be seen 3 x/week to address the identified rehab impairments via gait training, therapeutic activities, therapeutic exercises, neuromuscular re-education, wheelchair management/training and progress toward the following goals:    Plan of Care Expires:  04/11/24    Subjective     Chief Complaint: R arm pain  Patient/Family Comments/goals: Pt perseverating on "I am doing it"  Pain/Comfort:  Pain Rating 1:  (pt did " not rate)  Location - Side 1: Right  Location - Orientation 1: generalized  Location 1: arm  Pain Addressed 1: Reposition, Distraction      Objective:     Communicated with RN prior to session.  Patient found HOB elevated with EEG upon PT entry to room.     General Precautions: Standard, aspiration, fall  Orthopedic Precautions: N/A  Braces: N/A  Respiratory Status: Room air     Functional Mobility:    Bed Mobility:   Supine > Sit: total assistance and of 2 persons  Sit > Supine: total assistance and of 2 persons  Scooting: total assistance and of 2 persons    Transfers:   Not performed to due to safety concerns    Balance:   Sitting balance: FAIR: Cannot move trunk without losing balance  Pt sat EOB ~ 10 mins needing total A initially but able to progress to Min A.  Pt demonstrated R posterolateral lean   Anterior weight shifts performed to return to midline   Pt able to perform static hold ~3-5 secs         AM-PAC 6 CLICK MOBILITY  Turning over in bed (including adjusting bedclothes, sheets and blankets)?: 2  Sitting down on and standing up from a chair with arms (e.g., wheelchair, bedside commode, etc.): 1  Moving from lying on back to sitting on the side of the bed?: 2  Moving to and from a bed to a chair (including a wheelchair)?: 1  Need to walk in hospital room?: 1  Climbing 3-5 steps with a railing?: 1  Basic Mobility Total Score: 8       Treatment & Education:  Pt educated on tip to reduce fall risk and safety with mobility and using call button for assistance from nursing staff with bed mobility.  All questions answered within the scope of PT.  White board updated accordingly.      Patient left HOB elevated with all lines intact, call button in reach, RN notified, and sitter present..    GOALS:   Multidisciplinary Problems       Physical Therapy Goals          Problem: Physical Therapy    Goal Priority Disciplines Outcome Goal Variances Interventions   Physical Therapy Goal     PT, PT/OT Ongoing,  Progressing     Description: Goals to be met by: 2024     Patient will increase functional independence with mobility by performin. Supine to sit with Moderate Assistance  2. Sit to supine with Moderate Assistance  3. Sit to stand transfer with Moderate Assistance  4. Bed to chair transfer with Moderate Assistance using Rolling Walker  5. Gait  x 10 feet with Moderate Assistance using Rolling Walker.   6. Sitting at edge of bed x10 minutes with Minimal Assistance  7. Lower extremity exercise program x30 reps per handout, with assistance as needed  8. Propel wheelchair 40 feet using Bilateral upper extremities and minimal assistance                         Time Tracking:     PT Received On: 24  PT Start Time: 1303     PT Stop Time: 1325  PT Total Time (min): 22 min     Billable Minutes: Therapeutic Activity 22    Treatment Type: Treatment  PT/PTA: PT           2024

## 2024-03-14 NOTE — SUBJECTIVE & OBJECTIVE
Interval History: No new episodes of agitation. Patient engaging on conversation when easily directable. BG under control. Continue on EEG, pending final results.     Review of Systems   Unable to perform ROS: Psychiatric disorder     Objective:     Vital Signs (Most Recent):  Temp: 97.3 °F (36.3 °C) (03/14/24 1104)  Pulse: 85 (03/14/24 1104)  Resp: 16 (03/14/24 1104)  BP: (!) 153/97 (03/14/24 1104)  SpO2: 95 % (03/14/24 1104) Vital Signs (24h Range):  Temp:  [97.3 °F (36.3 °C)-99 °F (37.2 °C)] 97.3 °F (36.3 °C)  Pulse:  [] 85  Resp:  [16-17] 16  SpO2:  [92 %-97 %] 95 %  BP: (152-162)/() 153/97     Weight: 63.5 kg (140 lb)  Body mass index is 18.99 kg/m².    Intake/Output Summary (Last 24 hours) at 3/14/2024 1215  Last data filed at 3/14/2024 0422  Gross per 24 hour   Intake --   Output 500 ml   Net -500 ml         Physical Exam  Constitutional:       Comments: Disheveled    HENT:      Head: Normocephalic.   Eyes:      General: No scleral icterus.     Extraocular Movements: Extraocular movements intact.   Cardiovascular:      Rate and Rhythm: Normal rate and regular rhythm.   Pulmonary:      Effort: No respiratory distress.      Breath sounds: Normal breath sounds. No wheezing or rales.   Abdominal:      General: There is no distension.      Palpations: Abdomen is soft.   Musculoskeletal:      Cervical back: Normal range of motion.   Skin:     General: Skin is warm and dry.      Findings: No rash.   Neurological:      Mental Status: He is alert.   Psychiatric:         Mood and Affect: Affect is labile.         Speech: Speech is delayed.         Behavior: Behavior is slowed.         Cognition and Memory: Cognition is impaired.      Comments: Oriented to person and place.             Significant Labs: All pertinent labs within the past 24 hours have been reviewed.    Significant Imaging: I have reviewed all pertinent imaging results/findings within the past 24 hours.

## 2024-03-14 NOTE — PLAN OF CARE
Recommendations    1. Continue 2000 Calorie diabetic/ cardiac Minced and Moist (IDDSI Level 5) Diet; texture per SLP.   Provide 1:1 assistance as needed with meals.   2. Recommend ONS Boost GC BIB to optimize nutrition.   3. Continue thiamine, folic acid and MVI supplementation.   4. Monitor wt, PO intake and labs.   5. RD to monitor.    Goals: Meet % EEN/EPN by follow-up date.  Nutrition Goal Status: new  Communication of RD Recs: other (comment) (POC)

## 2024-03-14 NOTE — PT/OT/SLP PROGRESS
"Speech Language Pathology Treatment    Patient Name:  Cali Mabry   MRN:  [id number]  Admitting Diagnosis: Acute encephalopathy    Recommendations:                 General Recommendations:  Dysphagia therapy  Diet recommendations:  Puree Diet - IDDSI Level 4, Liquid Diet Level: Thin liquids - IDDSI Level 0   Aspiration Precautions: 1 bite/sip at a time, Alternating bites/sips, Avoid talking while eating, Check for pocketing/oral residue, Eliminate distractions, Feed only when awake/alert, HOB to 90 degrees, and Strict aspiration precautions   General Precautions: Standard, fall, aspiration  Communication strategies:  none    Assessment:     Cali Mabry is a 58 y.o. male with an SLP diagnosis of Dysphagia.   Subjective     Pt seen resting in bed comfortably   Patient goals: "You're annoying the sh*t out of me! "    Pain/Comfort:  Pain Rating 1: 0/10    Respiratory Status: Room air    Objective:     Has the patient been evaluated by SLP for swallowing?   Yes  Keep patient NPO? No   Current Respiratory Status:    Room air     Pt seen for ongoing dysphagia tx. Pt appearing agitated with some confusion throughout the session. Pt keeping eyes closed throughout the session though participating in therapy. During straw sips of thin liquids x2 pt exhibited overt clinical signs of aspiration that improved with cup edge sips x4. X1 tsp of minced/moist food from meal tray was administered and pt was with prolonged mastication. Pt did not clear the bolus INDEP and required multiple verbal cues to clear the bolus. No signs of aspiration were noted. Recommend pt downgrade to a pureed diet given his increased risk for aspiration this session. SLP will continue to monitor.     Goals:   Multidisciplinary Problems       SLP Goals          Problem: SLP    Goal Priority Disciplines Outcome   SLP Goal     SLP Ongoing, Progressing   Description: Speech Language Pathology Goals  Goals expected to be met by 3/27    1. The patient " will tolerate a least restrictive diet without overt signs of aspiration.                         Multidisciplinary Problems (Resolved)          Problem: SLP    Goal Priority Disciplines Outcome   SLP Goal   (Resolved)     SLP Met                       Plan:     Patient to be seen:  3 x/week   Plan of Care expires:  04/03/24  Plan of Care reviewed with:  patient   SLP Follow-Up:  Yes       Discharge recommendations:  No Therapy Indicated   Barriers to Discharge:  None     Time Tracking:     SLP Treatment Date:   03/14/24  Speech Start Time:  1105  Speech Stop Time:  1115     Speech Total Time (min):  10 min    Billable Minutes: Treatment Swallowing Dysfunction 10    03/14/2024

## 2024-03-14 NOTE — PROGRESS NOTES
"CONSULTATION LIAISON PSYCHIATRY PROGRESS NOTE    Patient Name: Cali Mabry  MRN: [id number]  Patient Class: IP- Inpatient  Admission Date: 3/4/2024  Attending Physician: Doroteo Vyas MD      SUBJECTIVE:   Cali Mabry is a 58 y.o. 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 UDS negative, Carbamazepine level undetectable. PEC placed in ED due to combative behavior and confusion. Patient was found to be COVID+ and have UTI. He was admitted to the hospital for Acute encephalopathy. Patient received Zyprexa 5 mg IM on 3/5 @ 1539.     Psychiatry consulted for "patient with history of Bipolar and substance use disorder, currently PEC'd. Consult for psych evaluation and need for placement in psych facility?"     Patient did not receive any psychotropic PRN medications overnight. Compliant with PO medications. /104 this morning. TSH 0.363, T4 pending. Ammonia, B12, LFT's wnl. RPR pending.    Today, patient seen at beside with EEG in process. Patient laying down with eyes closed. He verbally responds "good morning" to introduction but does not open his eyes for the entirety of the interview and even despite request to do so. He states "tell me your full name" when asked to do so himself and reports "I dont know" when asked further orientation questions. He does not follow commands to squeeze the interviewer's fingers or wiggle his toes and replies "No" when asked to do so. He did not respond to remainder of questions and interview terminated. Breakfast at bedside remains untouched.    Collateral:  Spoke with Tamia with [facility] [phone]. Denies any known history of catatonia, dystonic reaction, tardive dyskinesia, or " seizures. Reports the patient is fully oriented at baseline and able to feed himself.    OBJECTIVE:    Mental Status Exam:  General Appearance: dressed in hospital garb, lying in bed, thin and gaunt  Behavior: minimal responses, does not participate  Involuntary Movements and Motor Activity: no abnormal involuntary movements noted  Gait and Station: unable to assess - patient lying down or seated  Speech and Language: normal rate, normal rhythm, normal volume, normal tone, normal pitch, fluent English, responds to questions minimally/briefly, selectively mute, refuses to speak  Mood: unable to assess  Affect: flat  Thought Process and Associations: linear, goal-directed  Thought Content and Perceptions:: Unable to Assess  Sensorium and Orientation:  disoriented to place, otherwise did not respond to orientation questions, poor effort given during testing  Recent and Remote Memory: Unable to  Formally Assess  Attention and Concentration: Unable to Formally Assess  Fund of Knowledge: Unable to Formally Assess  Insight: poor, poor awareness of illness  Judgment: impaired due to delirium, behavior is inappropriate to the circumstances, noncompliant with health provider's recommendations and instructions    CAM ICU positive? Unable to assess    AIMS: Score 0/36  Abnormal Involuntary Movement Scale  0-4   Muscles of Facial Expression  0   Lips and Perioral Area  0   Jaw  0   Tongue  0   Upper (arms, wrists, hands, fingers)  0    Lower (legs, knees, ankles, toes)  0   Neck, shoulders, hips  0   Severity of abnormal movements (highest score from questions above)  0    Incapacitation due to abnormal movements  0    Patient's awareness of abnormal movements (rate only patient's report)  0   Current problems with teeth and/or dentures?  No    Does patient usually wear dentures?  No           ASSESSMENT & RECOMMENDATIONS   Acute Encephalopathy              R/O NPH  Intermittent Explosive Disorder  Hx TBI  Hx CVA  Hx Unspecified  Neurocognitive Disorder  Hx Bipolar Disorder  Hx MDD        Intermittent Explosive Disorder  Hx Bipolar Disorder  Hx MDD  PSYCH MEDICATIONS  Scheduled -  Continue to HOLD Risperdal 1 mg PO BID at this time  Continue to HOLD  Zyprexa (Zydis) 5 mg PO nightly at this time  Continue Tegretol home dose 200 mg daily and 400 mg nightly  Continue Effexor XR 75 mg PO daily  PRN  Continue Zyprexa 5 mg PO or IM for non-redirectable agitation associated with breakthrough psychosis or vero     DELIRIUM  Recommend EEG - currently in process, will follow-up results   Recommend CMP daily  Agree with B12, TSH, RPR  RPR pending  T4 pending  Recommended Neurology Consult for NPH R/O   Given acute mental status change, gait instability per collateral ~2-3 months, possible urinary incontinence and CT Head results from 3/4 with ventriculomegaly out of proportion to the degree of sulcal enlargement  Per Neuro, no interventions or investigations to be initiated in the inpatient setting.  DELIRIUM BEHAVIOR MANAGEMENT  PLEASE utilize CHEMICAL restraints with PRN meds first for agitation. Minimize use of PHYSICAL restraints OR have periods of being out of physical restraints if possible.  Keep window shades open and room lit during day and room dim at night in order to promote normal sleep-wake cycles  Encourage family at bedside. [place] patient often to situation, location, date.  Continue to Limit or Discontinue use of Narcotics, Benzos and Anti-cholinergic medications as they may worsen delirium.  Continue medical workup for causative etiology of Delirium.      RISK ASSESSMENT  NEEDS CEC because patient is in imminent danger of hurting self or others and is gravely disabled. & NEEDS 1:1 sitter     FOLLOW UP  Will follow up while in house     DISPOSITION - once medically cleared:   Will reassess daily, warrants continued medical work-up at this time    Please contact ON CALL psychiatry service (24/7) for any acute issues that may  arise.    Dr. Martha Storey MD  [facility] PGY-II    Psychiatry  [facility]  3/14/2024 1:05 PM        --------------------------------------------------------------------------------------------------------------------------------------------------------------------------------------------------------------------------------------    CONTINUED OBJECTIVE clinical data & findings reviewed and noted for above decision making    Current Medications:   Scheduled Meds:    aspirin  81 mg Oral Daily    atorvastatin  20 mg Oral Daily    carBAMazepine  200 mg Oral Daily    carBAMazepine  400 mg Oral QHS    enoxparin  40 mg Subcutaneous Daily    folic acid  1 mg Oral Daily    insulin aspart U-100  5 Units Subcutaneous TIDWM    insulin detemir U-100 (Levemir)  8 Units Subcutaneous BID    losartan  100 mg Oral Daily    melatonin  6 mg Oral Nightly    multivitamin  1 tablet Oral Daily    NIFEdipine  60 mg Oral Daily    polyethylene glycol  17 g Oral Q6H WAKE    senna-docusate 8.6-50 mg  2 tablet Oral BID    thiamine  100 mg Oral Daily    venlafaxine  75 mg Oral Daily     PRN Meds: albuterol, benzonatate, dextromethorphan-guaiFENesin  mg/5 ml, dextrose 10%, dextrose 10%, glucagon (human recombinant), glucose, glucose, insulin aspart U-100, labetalol, naloxone, OLANZapine, ondansetron, sodium chloride 0.9%    Allergies:   Review of patient's allergies indicates:  No Known Allergies    Vitals  Vitals:    03/14/24 0801   BP: (!) 161/104   Pulse: 79   Resp: 16   Temp: 97.5 °F (36.4 °C)       Labs/Imaging/Studies:  Recent Results (from the past 24 hour(s))   CBC auto differential    Collection Time: 03/13/24 10:15 AM   Result Value Ref Range    WBC 7.08 3.90 - 12.70 K/uL    RBC 5.01 4.60 - 6.20 M/uL    Hemoglobin 12.6 (L) 14.0 - 18.0 g/dL    Hematocrit 41.3 40.0 - 54.0 %    MCV 82 82 - 98 fL    MCH 25.1 (L) 27.0 - 31.0 pg    MCHC 30.5 (L) 32.0 - 36.0 g/dL    RDW 14.3 11.5 - 14.5 %    Platelets  335 150 - 450 K/uL    MPV 9.6 9.2 - 12.9 fL    Immature Granulocytes 0.8 (H) 0.0 - 0.5 %    Gran # (ANC) 4.6 1.8 - 7.7 K/uL    Immature Grans (Abs) 0.06 (H) 0.00 - 0.04 K/uL    Lymph # 1.4 1.0 - 4.8 K/uL    Mono # 0.7 0.3 - 1.0 K/uL    Eos # 0.2 0.0 - 0.5 K/uL    Baso # 0.03 0.00 - 0.20 K/uL    nRBC 0 0 /100 WBC    Gran % 65.0 38.0 - 73.0 %    Lymph % 20.2 18.0 - 48.0 %    Mono % 10.2 4.0 - 15.0 %    Eosinophil % 3.4 0.0 - 8.0 %    Basophil % 0.4 0.0 - 1.9 %    Differential Method Automated    POCT glucose    Collection Time: 03/13/24 11:22 AM   Result Value Ref Range    POCT Glucose 238 (H) 70 - 110 mg/dL   SARS-Cov2 (COVID) with FLU/RSV by PCR    Collection Time: 03/13/24 11:34 AM   Result Value Ref Range    SARS-CoV2 (COVID-19) Qualitative PCR Detected (A) Not Detected    Influenza A, Molecular Not Detected Not Detected    Influenza B, Molecular Not Detected Not Detected    RSV Ag by Molecular Method Not Detected Not Detected   TSH    Collection Time: 03/13/24  1:41 PM   Result Value Ref Range    TSH 0.363 (L) 0.400 - 4.000 uIU/mL   Ammonia    Collection Time: 03/13/24  1:41 PM   Result Value Ref Range    Ammonia 35 10 - 50 umol/L   Vitamin B12    Collection Time: 03/13/24  1:41 PM   Result Value Ref Range    Vitamin B-12 360 210 - 950 pg/mL   Hepatic function panel    Collection Time: 03/13/24  1:41 PM   Result Value Ref Range    Total Protein 7.5 6.0 - 8.4 g/dL    Albumin 3.2 (L) 3.5 - 5.2 g/dL    Total Bilirubin 0.2 0.1 - 1.0 mg/dL    Bilirubin, Direct 0.1 0.1 - 0.3 mg/dL    AST 23 10 - 40 U/L    ALT 28 10 - 44 U/L    Alkaline Phosphatase 108 55 - 135 U/L   T4, Free    Collection Time: 03/13/24  1:41 PM   Result Value Ref Range    Free T4 0.91 0.71 - 1.51 ng/dL   POCT glucose    Collection Time: 03/13/24  4:42 PM   Result Value Ref Range    POCT Glucose 124 (H) 70 - 110 mg/dL   POCT glucose    Collection Time: 03/13/24  7:56 PM   Result Value Ref Range    POCT Glucose 118 (H) 70 - 110 mg/dL   POCT glucose     Collection Time: 03/14/24  8:00 AM   Result Value Ref Range    POCT Glucose 98 70 - 110 mg/dL     Imaging Results              CT Head Without Contrast (Final result)  Result time 03/04/24 16:55:45      Final result by Jered Cheatham MD (03/04/24 16:55:45)                   Impression:      No evidence of acute hemorrhage or major vascular distribution infarct.    Chronic small vessel ischemic change with remote right occipital and left cerebellar infarcts.    Ventriculomegaly out of proportion to the degree of sulcal enlargement, similar to prior exams.  While this could relate to cerebral volume loss, the configuration does raise the possibility of normal pressure hydrocephalus. Clinical correlation required.      Electronically signed by: Jered hCeatham MD  Date:    03/04/2024  Time:    16:55               Narrative:    EXAMINATION:  CT HEAD WITHOUT CONTRAST    CLINICAL HISTORY:  Mental status change, unknown cause;    TECHNIQUE:  Low dose axial CT images obtained throughout the head without the use of intravenous contrast.  Axial, sagittal and coronal reconstructions were performed.    COMPARISON:  12/10/2023.    FINDINGS:  Intracranial compartment:    Ventricles are enlarged, similar to prior.  Third ventricle diameter again measuring up to 1.4 cm.  Ventricular enlargement out of proportion of degree of sulcal enlargement.    Chronic small vessel ischemic change in the supratentorial white matter.  Remote right occipital infarct.  Remote left cerebellar infarct.  No acute major vascular distribution infarct.  No acute parenchymal hemorrhage.  No new intracranial mass effect or midline shift.    No extra-axial blood or fluid collections.    Skull/extracranial contents (limited evaluation):    No displaced calvarial fracture.    Mastoid air cells are clear. Mild patchy mucosal thickening in the visualized paranasal sinuses.                                       CT Abdomen Pelvis With IV Contrast NO Oral  Contrast (Final result)  Result time 03/04/24 18:05:52      Final result by New Falcon MD (03/04/24 18:05:52)                   Impression:      Moderate colonic stool burden with mild wall thickening of the rectum, correlate with signs of constipation or proctitis.    Diffuse bladder wall thickening, correlate with urinalysis and symptoms of cystitis.    Diverticulosis coli without evidence of acute diverticulitis    Stable 5 mm right middle lobe solid pulmonary micronodule.    Additional findings as above.    Electronically signed by resident: Marsha Cardona  Date:    03/04/2024  Time:    17:27    Electronically signed by: New Falcon MD  Date:    03/04/2024  Time:    18:05               Narrative:    EXAMINATION:  CT ABDOMEN PELVIS WITH IV CONTRAST    CLINICAL HISTORY:  Abdominal abscess/infection suspected;    TECHNIQUE:  Axial images of the abdomen and pelvis were acquired after administration of 75 cc Omnipaque 350 IV contrast. No oral contrast was administered.  Coronal and sagittal reconstructions were also obtained.    COMPARISON:  CT 12/12/2023 and 07/26/2023.    FINDINGS:  Examination degraded by patient motion artifact.    Heart: Normal heart size.  No pericardial fluid.  No significant calcific coronary atherosclerosis.    Lungs: Evaluation of the lung bases limited due to respiratory motion.  Allowing for this, stable linear bandlike opacities of the right lung base suggestive of atelectasis/scarring.  Stable 5 mm right middle lobe solid pulmonary micronodule (2-7).  No new focal consolidation.  No pleural effusion or pneumothorax.    Liver: Normal in size and contour.  No focal hepatic lesion.    Gallbladder: No calcified gallstones.    Bile Ducts: No biliary ductal dilatation.    Pancreas: No mass or peripancreatic fat stranding.    Spleen: Spleen is upper limits of normal in size measuring 12 cm.    Adrenals: No significant abnormalities.    Kidneys/Ureters: Normal in size and location. Normal  enhancement. No hydronephrosis or nephrolithiasis. No ureteral dilatation.    Bladder: Diffuse bladder wall thickening.    Reproductive organs: Prostate is unremarkable.    Peritoneum: No free air or free fluid.    Retroperitoneum: No pathologically enlarged retroperitoneal lymph nodes.    Bowel/Mesentery: Small hiatal hernia.  Stomach is otherwise unremarkable.  Small bowel is normal in caliber without evidence of inflammation or obstruction.  Moderate colonic stool burden.  Mild wall thickening of the rectum with large fecal burden, component of proctitis to be considered.  Scattered colonic diverticuli.  Appendix not definitively visualized.  No steve-cecal soft tissue stranding.    Abdominal wall:  Small fat containing umbilical hernia. 4.7 x 1.8 cm fat containing lesion along the left anterolateral chest wall, favored to represent a lipoma.    Vasculature: Mild calcific atherosclerosis of the abdominal aorta and its branches.  No aneurysm.    Bones: Mild degenerative changes spine.  Prominent Schmorl's node at L1, unchanged.  Healing left anterolateral rib fractures.  No acute fractures.  Lucent lesion within the right femoral neck, stable dating back to multiple priors.                                       X-Ray Chest AP Portable (Final result)  Result time 03/04/24 13:37:35      Final result by Cullen Bush MD (03/04/24 13:37:35)                   Impression:      See above      Electronically signed by: Cullen Bush MD  Date:    03/04/2024  Time:    13:37               Narrative:    EXAMINATION:  XR CHEST AP PORTABLE    CLINICAL HISTORY:  Sepsis;    TECHNIQUE:  Single frontal view of the chest was performed.    COMPARISON:  None 12/22/2023    FINDINGS:  Heart size normal.  The lungs are clear.  No pleural effusion

## 2024-03-14 NOTE — ASSESSMENT & PLAN NOTE
Patient's anemia is currently controlled. Has not received any PRBCs to date. Etiology likely d/t chronic disease due to recurrent infections, inflammation  Current CBC reviewed-   Lab Results   Component Value Date    HGB 12.6 (L) 03/13/2024    HCT 41.3 03/13/2024     Monitor serial CBC and transfuse if patient becomes hemodynamically unstable, symptomatic or H/H drops below 7/21.

## 2024-03-14 NOTE — ASSESSMENT & PLAN NOTE
Patient's FSGs are uncontrolled due to hyperglycemia on current medication regimen.  Last A1c reviewed-   Lab Results   Component Value Date    HGBA1C 10.1 (H) 03/07/2024     Most recent fingerstick glucose reviewed-   Recent Labs   Lab 03/13/24  1642 03/13/24  1956 03/14/24  0800 03/14/24  1101   POCTGLUCOSE 124* 118* 98 129*       Current correctional scale  Low    Antihyperglycemics (From admission, onward)    Start     Stop Route Frequency Ordered    03/12/24 0900  insulin detemir U-100 (Levemir) pen 8 Units         -- SubQ 2 times daily 03/12/24 0814    03/11/24 1130  insulin aspart U-100 pen 5 Units         -- SubQ 3 times daily with meals 03/11/24 0921    03/07/24 1649  insulin aspart U-100 pen 0-5 Units         -- SubQ Before meals & nightly PRN 03/07/24 1550          -- Hold oral hyperglycemics  -- SSI, POCT BG qACHS; titrate basal/bolus regimen PRN to 140-180 goal  -- Diabetic diet  - Detemir 8U BID / Aspart 5U TIDWM + LDSSI   - Current A1C 10.1. Reviewed previous A1C on 11.3  - patient non-compliant with insulin regimen at home

## 2024-03-14 NOTE — PROGRESS NOTES
[facility] Medicine  Progress Note    Patient Name: Cali Mabry  MRN: [id number]  Patient Class: IP- Inpatient   Admission Date: 3/4/2024  Length of Stay: 10 days  Attending Physician: Doroteo Vyas MD  Primary Care Provider: Lawanda Flores NP        Subjective:     Principal Problem:Acute encephalopathy        HPI:  58 M with DM1, extensive IVDU, HCV Ab+, HTN, previous CVA with R sided deficits, HFpEF, CAD, chronic pancreatitis, mixed mood / psychiatric disorder [place] [facility] [place] chief complaint of weakness of unspecified duration and onset. Admits to using shooting methamphetamines. Rest of history from patient is non-contributory due to uncooperation and alternating agitation and somnolence. HPI is written with review of previous medical records, ED records, discussed with patient's mother via telephone, who was able to provide limited insight into the patient's acute medical condition. Patient's mother states that patient's PO intake has decreased but he would not cooperate with her nor would he tell her what was bothering him. Patient reported to want to stay in bed for the last 2 days.     Of note, [place] [facility] [place] of [place] and UTI (largely pan-sensitive Proteus mirabilis). In ED, patient was extremely agitated, given multiple doses of Ativan due to agitation, aggressive/abusive statements towards nursing and medical staff. Tachycardic to 110s, hypertensive, febrile to 101.5. Given broad spectrum Abx (Vanc/Zosyn). PEC'ed by ED due to extreme agitation.    Overview/Hospital Course:  Patient admitted for altered mental status. Suspect it was secondary to UTI and COVID infection. UDS negative. Patient was PEC'd while in the ED for agitation. Patients mental status improving. Will consult psychiatry if patient needs psych placement. Finished remdesivir course, not hypoxic. Improving respiratory status. Minor episodes of cough.  Reviewed previous A1C from 2 months on 11.3. uptitrated insulin regimen. Psychiatry following, patient with intermittent explosive disorder, and recommend maintaining current regimen, as well as PEC as he is confused. Current presentation consistent with encephalopathy rather than psychosis or vero. Psych not recommending Psych placement. Once he is back to baseline, they will reassess need for psych placement. Titrating blood glucose. Continues on PO cefpodoxime. Neurology consulted for NPH, recommending outpatient neurosurgery evaluation. Episode of hypertension, controlled wit PRN labetalol. Started on PO amlodipine for BP control. Decreased risperidone dose as per psychiatry recs given patient's tone is increased on examination. Hypoglycemic overnight, started on D10 bolus, improved. Will reach out again to family for new collaterals on patient baseline status. Somnolent on examination. Optimizing blood glucose management, PRN zyprexa for agitation only. Increased tegretol dose to 200 AM, 400PM as per psychiatry recommendations. Continue holding risperidone. Patient somnolent. No new episodes of agitation for more than 48hs. Optimizing BG. Placed on EEG. Will follow official results.     Interval History: No new episodes of agitation. Patient engaging on conversation when easily directable. BG under control. Continue on EEG, pending final results.     Review of Systems   Unable to perform ROS: Psychiatric disorder     Objective:     Vital Signs (Most Recent):  Temp: 97.3 °F (36.3 °C) (03/14/24 1104)  Pulse: 85 (03/14/24 1104)  Resp: 16 (03/14/24 1104)  BP: (!) 153/97 (03/14/24 1104)  SpO2: 95 % (03/14/24 1104) Vital Signs (24h Range):  Temp:  [97.3 °F (36.3 °C)-99 °F (37.2 °C)] 97.3 °F (36.3 °C)  Pulse:  [] 85  Resp:  [16-17] 16  SpO2:  [92 %-97 %] 95 %  BP: (152-162)/() 153/97     Weight: 63.5 kg (140 lb)  Body mass index is 18.99 kg/m².    Intake/Output Summary (Last 24 hours) at 3/14/2024  1215  Last data filed at 3/14/2024 0422  Gross per 24 hour   Intake --   Output 500 ml   Net -500 ml         Physical Exam  Constitutional:       Comments: Disheveled    HENT:      Head: Normocephalic.   Eyes:      General: No scleral icterus.     Extraocular Movements: Extraocular movements intact.   Cardiovascular:      Rate and Rhythm: Normal rate and regular rhythm.   Pulmonary:      Effort: No respiratory distress.      Breath sounds: Normal breath sounds. No wheezing or rales.   Abdominal:      General: There is no distension.      Palpations: Abdomen is soft.   Musculoskeletal:      Cervical back: Normal range of motion.   Skin:     General: Skin is warm and dry.      Findings: No rash.   Neurological:      Mental Status: He is alert.   Psychiatric:         Mood and Affect: Affect is labile.         Speech: Speech is delayed.         Behavior: Behavior is slowed.         Cognition and Memory: Cognition is impaired.      Comments: Oriented to person and place.             Significant Labs: All pertinent labs within the past 24 hours have been reviewed.    Significant Imaging: I have reviewed all pertinent imaging results/findings within the past 24 hours.    Assessment/Plan:      * Acute encephalopathy  Likely 2/2 to drug use, but underlying COVID+ infection and possible UTI may be contributing factors. Utox negative, but reported history of recent methamphetamine use.     -- Patient improving on mental status   -- PEC'ed patient prior to admission to .   -- Capacity evaluation QD  -- PRN Zyprexa for non-redirectable agitation / violence  -- Treat underlying infection  -- See Bipolar Disorder A/P for home medications  - Negative encephalopathy workup  - Psychiatry following, at the moment not a candidate for IP psych placement given persistent delirium     Seizure  High risk of seizures, given prior history of stroke.     - Follow EEG results     Constipation  CTAP wc s/o moderate stool burden, diverticula  w/o infection. Negative KUB.    - optimizing bowel regimen.       COVID  Patient presenting with incidental COVID+ 03/04/2024. No symptoms of dyspnea, shortness of breath. Presenting for unspecified weakness. No hypoxemia.  COVID-19 testing:   Isolation: Airborne/Droplet. Surgical mask on patient. Notify Infection Control. Finished remdesivir management. No steroids given not hypoxemic.     -- No O2 requirement at this time  -- COVID risk score: 5; will initiate High-Risk Remdesivir 3 day course  -- No role for Dexamethasone d/t normal SpO2 on RA  -- No role for CAP coverage d/t lack of focal consolidation on CXR  -- No role for therapeutic AC d/t normal SpO2 on RA  -- Acetaminophen 650mg PO Q6hr PRN fever/headache  -- IVF if indicated, restrictive strategy preferred, no maintenance IV if able  -- antitussives: dextromethorphan/guaifenesin, tessalon PRN  -- albuterol: 2-4 puff q6h if patient wheezing    Acute cystitis without hematuria  -Resolved.     Bipolar disorder  -- Continue home medication regimen:  -venlafaxine  - carbamazepine 200 AM, 400 PM   - hold risperidone      Intermittent explosive disorder  Psychiatry following, patient with intermittent explosive disorder, and recommend maintaining current regimen, as well as PEC as he is confused. Current presentation consistent with encephalopathy rather than psychosis or vero. Psych not recommending Psych placement. Once he is back to baseline, they will reassess need for psych placement.       Type 1 diabetes mellitus with hyperglycemia  Patient's FSGs are uncontrolled due to hyperglycemia on current medication regimen.  Last A1c reviewed-   Lab Results   Component Value Date    HGBA1C 10.1 (H) 03/07/2024     Most recent fingerstick glucose reviewed-   Recent Labs   Lab 03/13/24  1642 03/13/24  1956 03/14/24  0800 03/14/24  1101   POCTGLUCOSE 124* 118* 98 129*       Current correctional scale  Low    Antihyperglycemics (From admission, onward)      Start      Stop Route Frequency Ordered    03/12/24 0900  insulin detemir U-100 (Levemir) pen 8 Units         -- SubQ 2 times daily 03/12/24 0814    03/11/24 1130  insulin aspart U-100 pen 5 Units         -- SubQ 3 times daily with meals 03/11/24 0921    03/07/24 1649  insulin aspart U-100 pen 0-5 Units         -- SubQ Before meals & nightly PRN 03/07/24 1550            -- Hold oral hyperglycemics  -- SSI, POCT BG qACHS; titrate basal/bolus regimen PRN to 140-180 goal  -- Diabetic diet  - Detemir 8U BID / Aspart 5U TIDWM + LDSSI   - Current A1C 10.1. Reviewed previous A1C on 11.3  - patient non-compliant with insulin regimen at home     CAD (coronary artery disease)  Hyperlipidemia    Known CAD. Patient not exhibiting ACS symptoms at this time.     Last known LHC done 09/2023:  1.  60% in stent restenosis of previously placed right coronary artery stent.  2.  Successful balloon angioplasty with a 3 0 x 20 fall by 3.5 x 20 noncompliant balloon.  60% stenosis reduced to 0%.  YENI 3 flow.  No dissection.  3.  50% stenosis noted in the mid and distal left anterior descending artery.  There is a 70 % stenosis at the apex, small caliber.    -- Continue HI Statin  -- Continue ASA 81  -- Hold Plavix as patient's > 1 year from last PCI    COPD (chronic obstructive pulmonary disease)  Patient's COPD is controlled currently.  Patient is currently off COPD Pathway.     -PRN inhalers    Sepsis  Currently not having sepsis criteria. Positive nitrates on admission. Finished AB management.     Polysubstance use disorder  History of recent methamphetamine, tobacco, alcohol use. Likely contributor to psychiatric disorder and agitation      Hemiparesis affecting right side as late effect of stroke  Noted per history    Anemia of chronic disease  Patient's anemia is currently controlled. Has not received any PRBCs to date. Etiology likely d/t chronic disease due to recurrent infections, inflammation  Current CBC reviewed-   Lab Results    Component Value Date    HGB 12.6 (L) 03/13/2024    HCT 41.3 03/13/2024     Monitor serial CBC and transfuse if patient becomes hemodynamically unstable, symptomatic or H/H drops below 7/21.    Primary hypertension  Chronic, controlled. Latest blood pressure and vitals reviewed-     Temp:  [97.3 °F (36.3 °C)-99 °F (37.2 °C)]   Pulse:  []   Resp:  [16-17]   BP: (152-162)/()   SpO2:  [92 %-97 %] .   Home meds for hypertension were reviewed and noted below.   Hypertension Medications               losartan (COZAAR) 50 MG tablet Take 1 tablet (50 mg total) by mouth once daily.    metoprolol succinate (TOPROL-XL) 25 MG 24 hr tablet Take 1 tablet (25 mg total) by mouth once daily.          - on losartan 50 and nifedipine 60 as IP   - PRN labetalol       VTE Risk Mitigation (From admission, onward)           Ordered     enoxaparin injection 40 mg  Daily         03/04/24 1904     IP VTE HIGH RISK PATIENT  Once         03/04/24 1904     Place sequential compression device  Until discontinued         03/04/24 1904                    Discharge Planning   ROLA: 3/16/2024     Code Status: Full Code   Is the patient medically ready for discharge?: No    Reason for patient still in hospital (select all that apply): Patient trending condition  Discharge Plan A: Psychiatric hospital   Discharge Delays: None known at this time        Kaleb Monson MD  Department of Hospital Medicine   [facility]

## 2024-03-14 NOTE — ASSESSMENT & PLAN NOTE
Chronic, controlled. Latest blood pressure and vitals reviewed-     Temp:  [97.3 °F (36.3 °C)-99 °F (37.2 °C)]   Pulse:  []   Resp:  [16-17]   BP: (152-162)/()   SpO2:  [92 %-97 %] .   Home meds for hypertension were reviewed and noted below.   Hypertension Medications               losartan (COZAAR) 50 MG tablet Take 1 tablet (50 mg total) by mouth once daily.    metoprolol succinate (TOPROL-XL) 25 MG 24 hr tablet Take 1 tablet (25 mg total) by mouth once daily.          - on losartan 50 and nifedipine 60 as IP   - PRN labetalol

## 2024-03-14 NOTE — PLAN OF CARE
Problem: Occupational Therapy  Goal: Occupational Therapy Goal  Description: Goals to be met by: 04/14/2024     Patient will increase functional independence with ADLs by performing:    UE Dressing with Moderate Assistance.  LE Dressing with Moderate Assistance.  Grooming while EOB with Moderate Assistance.  Toileting from bedside commode with Maximum Assistance for hygiene and clothing management.   Rolling to Right, Left with Minimal Assistance.   Supine to sit with Minimal Assistance.  Squat pivot transfers with Moderate Assistance.    Outcome: Ongoing, Progressing       OT eval complete & goals established.

## 2024-03-14 NOTE — PROGRESS NOTES
[facility] Medicine  Progress Note    Patient Name: Cali Mabry  MRN: [id number]  Patient Class: IP- Inpatient   Admission Date: 3/4/2024  Length of Stay: 10 days  Attending Physician: Doroteo Vyas MD  Primary Care Provider: Lawanda Flores NP        Subjective:     Principal Problem:Acute encephalopathy        HPI:  58 M with DM1, extensive IVDU, HCV Ab+, HTN, previous CVA with R sided deficits, HFpEF, CAD, chronic pancreatitis, mixed mood / psychiatric disorder presenting to [facility] [place] ED with chief complaint of weakness of unspecified duration and onset. Admits to using shooting methamphetamines. Rest of history from patient is non-contributory due to uncooperation and alternating agitation and somnolence. HPI is written with review of previous medical records, ED records, discussed with patient's mother via telephone, who was able to provide limited insight into the patient's acute medical condition. Patient's mother states that patient's PO intake has decreased but he would not cooperate with her nor would he tell her what was bothering him. Patient reported to want to stay in bed for the last 2 days.     Of note, patient was recently discharged from [facility] following treatment of DKA and UTI (largely pan-sensitive Proteus mirabilis). In ED, patient was extremely agitated, given multiple doses of Ativan due to agitation, aggressive/abusive statements towards nursing and medical staff. Tachycardic to 110s, hypertensive, febrile to 101.5. Given broad spectrum Abx (Vanc/Zosyn). PEC'ed by ED due to extreme agitation.    Overview/Hospital Course:  Patient admitted for altered mental status. Suspect it was secondary to UTI and COVID infection. UDS negative. Patient was PEC'd while in the ED for agitation. Patients mental status improving. Will consult psychiatry if patient needs psych placement. Finished remdesivir course, not hypoxic. Improving respiratory status. Minor episodes of cough.  Eye OD, Iol Type TP TORIC, Post Operative Target [place]/-0.50, Package TP TORIC. Plan on extended drops due to ERM OD. Reviewed previous A1C from 2 months on 11.3. uptitrated insulin regimen. Psychiatry following, patient with intermittent explosive disorder, and recommend maintaining current regimen, as well as PEC as he is confused. Current presentation consistent with encephalopathy rather than psychosis or vero. Psych not recommending Psych placement. Once he is back to baseline, they will reassess need for psych placement. Titrating blood glucose. Continues on PO cefpodoxime. Neurology consulted for NPH, recommending outpatient neurosurgery evaluation. Episode of hypertension, controlled wit PRN labetalol. Started on PO amlodipine for BP control. Decreased risperidone dose as per psychiatry recs given patient's tone is increased on examination. Hypoglycemic overnight, started on D10 bolus, improved. Will reach out again to family for new collaterals on patient baseline status. Somnolent on examination. Optimizing blood glucose management, PRN zyprexa for agitation only. Increased tegretol dose to 200 AM, 400PM as per psychiatry recommendations. Continue holding risperidone. Patient somnolent. No new episodes of agitation for more than 48hs. Optimizing BG. Placed on EEG. Will follow official results.     No new subjective & objective note has been filed under this hospital service since the last note was generated.      Assessment/Plan:      * Acute encephalopathy  Likely 2/2 to drug use, but underlying COVID+ infection and possible UTI may be contributing factors. Utox negative, but reported history of recent methamphetamine use.     -- Patient improving on mental status   -- PEC'ed patient prior to admission to .   -- Capacity evaluation QD  -- PRN Zyprexa for non-redirectable agitation / violence  -- Treat underlying infection  -- See Bipolar Disorder A/P for home medications  - Negative encephalopathy workup  - Psychiatry following, at the moment not a candidate for IP psych placement given persistent delirium      Seizure  High risk of seizures, given prior history of stroke.     - Follow EEG results     Constipation  CTAP wc s/o moderate stool burden, diverticula w/o infection. Negative KUB.    - optimizing bowel regimen.       COVID  Patient presenting with incidental COVID+ 03/04/2024. No symptoms of dyspnea, shortness of breath. Presenting for unspecified weakness. No hypoxemia.  COVID-19 testing:   Isolation: Airborne/Droplet. Surgical mask on patient. Notify Infection Control. Finished remdesivir management. No steroids given not hypoxemic.     -- No O2 requirement at this time  -- COVID risk score: 5; will initiate High-Risk Remdesivir 3 day course  -- No role for Dexamethasone d/t normal SpO2 on RA  -- No role for CAP coverage d/t lack of focal consolidation on CXR  -- No role for therapeutic AC d/t normal SpO2 on RA  -- Acetaminophen 650mg PO Q6hr PRN fever/headache  -- IVF if indicated, restrictive strategy preferred, no maintenance IV if able  -- antitussives: dextromethorphan/guaifenesin, tessalon PRN  -- albuterol: 2-4 puff q6h if patient wheezing    Acute cystitis without hematuria  -Resolved.     Bipolar disorder  -- Continue home medication regimen:  -venlafaxine  - carbamazepine 200 AM, 400 PM   - hold risperidone      Intermittent explosive disorder  Psychiatry following, patient with intermittent explosive disorder, and recommend maintaining current regimen, as well as PEC as he is confused. Current presentation consistent with encephalopathy rather than psychosis or vero. Psych not recommending Psych placement. Once he is back to baseline, they will reassess need for psych placement.       Type 1 diabetes mellitus with hyperglycemia  Patient's FSGs are uncontrolled due to hyperglycemia on current medication regimen.  Last A1c reviewed-   Lab Results   Component Value Date    HGBA1C 10.1 (H) 03/07/2024     Most recent fingerstick glucose reviewed-   Recent Labs   Lab 03/13/24  1642 03/13/24 1956  03/14/24  0800 03/14/24  1101   POCTGLUCOSE 124* 118* 98 129*       Current correctional scale  Low    Antihyperglycemics (From admission, onward)      Start     Stop Route Frequency Ordered    03/12/24 0900  insulin detemir U-100 (Levemir) pen 8 Units         -- SubQ 2 times daily 03/12/24 0814    03/11/24 1130  insulin aspart U-100 pen 5 Units         -- SubQ 3 times daily with meals 03/11/24 0921    03/07/24 1649  insulin aspart U-100 pen 0-5 Units         -- SubQ Before meals & nightly PRN 03/07/24 1550            -- Hold oral hyperglycemics  -- SSI, POCT BG qACHS; titrate basal/bolus regimen PRN to 140-180 goal  -- Diabetic diet  - Detemir 8U BID / Aspart 5U TIDWM + LDSSI   - Current A1C 10.1. Reviewed previous A1C on 11.3  - patient non-compliant with insulin regimen at home     CAD (coronary artery disease)  Hyperlipidemia    Known CAD. Patient not exhibiting ACS symptoms at this time.     Last known LHC done 09/2023:  1.  60% in stent restenosis of previously placed right coronary artery stent.  2.  Successful balloon angioplasty with a 3 0 x 20 fall by 3.5 x 20 noncompliant balloon.  60% stenosis reduced to 0%.  YENI 3 flow.  No dissection.  3.  50% stenosis noted in the mid and distal left anterior descending artery.  There is a 70 % stenosis at the apex, small caliber.    -- Continue HI Statin  -- Continue ASA 81  -- Hold Plavix as patient's > 1 year from last PCI    COPD (chronic obstructive pulmonary disease)  Patient's COPD is controlled currently.  Patient is currently off COPD Pathway.     -PRN inhalers    Sepsis  Currently not having sepsis criteria. Positive nitrates on admission. Finished AB management.     Polysubstance use disorder  History of recent methamphetamine, tobacco, alcohol use. Likely contributor to psychiatric disorder and agitation      Hemiparesis affecting right side as late effect of stroke  Noted per history    Anemia of chronic disease  Patient's anemia is currently controlled.  Has not received any PRBCs to date. Etiology likely d/t chronic disease due to recurrent infections, inflammation  Current CBC reviewed-   Lab Results   Component Value Date    HGB 12.6 (L) 03/13/2024    HCT 41.3 03/13/2024     Monitor serial CBC and transfuse if patient becomes hemodynamically unstable, symptomatic or H/H drops below 7/21.    Primary hypertension  Chronic, controlled. Latest blood pressure and vitals reviewed-     Temp:  [97.3 °F (36.3 °C)-99 °F (37.2 °C)]   Pulse:  []   Resp:  [16-17]   BP: (152-162)/()   SpO2:  [92 %-97 %] .   Home meds for hypertension were reviewed and noted below.   Hypertension Medications               losartan (COZAAR) 50 MG tablet Take 1 tablet (50 mg total) by mouth once daily.    metoprolol succinate (TOPROL-XL) 25 MG 24 hr tablet Take 1 tablet (25 mg total) by mouth once daily.          - on losartan 50 and nifedipine 60 as IP   - PRN labetalol       VTE Risk Mitigation (From admission, onward)           Ordered     enoxaparin injection 40 mg  Daily         03/04/24 1904     IP VTE HIGH RISK PATIENT  Once         03/04/24 1904     Place sequential compression device  Until discontinued         03/04/24 1904                    Discharge Planning   ROLA: 3/16/2024     Code Status: Full Code   Is the patient medically ready for discharge?: No    Reason for patient still in hospital (select all that apply): Patient trending condition  Discharge Plan A: Psychiatric hospital   Discharge Delays: None known at this time              Kaleb Monson MD  Department of Hospital Medicine   [facility]

## 2024-03-14 NOTE — ASSESSMENT & PLAN NOTE
Likely 2/2 to drug use, but underlying COVID+ infection and possible UTI may be contributing factors. Utox negative, but reported history of recent methamphetamine use.     -- Patient improving on mental status   -- PEC'ed patient prior to admission to .   -- Capacity evaluation QD  -- PRN Zyprexa for non-redirectable agitation / violence  -- Treat underlying infection  -- See Bipolar Disorder A/P for home medications  - Negative encephalopathy workup  - Psychiatry following, at the moment not a candidate for IP psych placement given persistent delirium

## 2024-03-15 ENCOUNTER — DOCUMENTATION ONLY (OUTPATIENT)
Dept: NEUROLOGY | Facility: CLINIC | Age: 59
End: 2024-03-15
Payer: MEDICARE

## 2024-03-15 PROBLEM — R56.9 SEIZURE: Status: RESOLVED | Noted: 2024-03-14 | Resolved: 2024-03-15

## 2024-03-15 PROBLEM — G93.41 METABOLIC ENCEPHALOPATHY: Status: ACTIVE | Noted: 2024-03-15

## 2024-03-15 LAB
ALBUMIN SERPL BCP-MCNC: 3.3 G/DL (ref 3.5–5.2)
ALP SERPL-CCNC: 113 U/L (ref 55–135)
ALT SERPL W/O P-5'-P-CCNC: 28 U/L (ref 10–44)
ANION GAP SERPL CALC-SCNC: 13 MMOL/L (ref 8–16)
ANION GAP SERPL CALC-SCNC: 13 MMOL/L (ref 8–16)
AST SERPL-CCNC: 22 U/L (ref 10–40)
BILIRUB SERPL-MCNC: 0.3 MG/DL (ref 0.1–1)
BUN SERPL-MCNC: 26 MG/DL (ref 6–20)
BUN SERPL-MCNC: 26 MG/DL (ref 6–20)
CALCIUM SERPL-MCNC: 9.5 MG/DL (ref 8.7–10.5)
CALCIUM SERPL-MCNC: 9.5 MG/DL (ref 8.7–10.5)
CHLORIDE SERPL-SCNC: 101 MMOL/L (ref 95–110)
CHLORIDE SERPL-SCNC: 101 MMOL/L (ref 95–110)
CO2 SERPL-SCNC: 21 MMOL/L (ref 23–29)
CO2 SERPL-SCNC: 21 MMOL/L (ref 23–29)
CREAT SERPL-MCNC: 1.3 MG/DL (ref 0.5–1.4)
CREAT SERPL-MCNC: 1.3 MG/DL (ref 0.5–1.4)
EST. GFR  (NO RACE VARIABLE): >60 ML/MIN/1.73 M^2
EST. GFR  (NO RACE VARIABLE): >60 ML/MIN/1.73 M^2
GLUCOSE SERPL-MCNC: 266 MG/DL (ref 70–110)
GLUCOSE SERPL-MCNC: 266 MG/DL (ref 70–110)
POCT GLUCOSE: 111 MG/DL (ref 70–110)
POCT GLUCOSE: 224 MG/DL (ref 70–110)
POCT GLUCOSE: 267 MG/DL (ref 70–110)
POCT GLUCOSE: 60 MG/DL (ref 70–110)
POCT GLUCOSE: 90 MG/DL (ref 70–110)
POTASSIUM SERPL-SCNC: 4.8 MMOL/L (ref 3.5–5.1)
POTASSIUM SERPL-SCNC: 4.8 MMOL/L (ref 3.5–5.1)
PROT SERPL-MCNC: 7.3 G/DL (ref 6–8.4)
SODIUM SERPL-SCNC: 135 MMOL/L (ref 136–145)
SODIUM SERPL-SCNC: 135 MMOL/L (ref 136–145)

## 2024-03-15 PROCEDURE — 25000003 PHARM REV CODE 250: Performed by: STUDENT IN AN ORGANIZED HEALTH CARE EDUCATION/TRAINING PROGRAM

## 2024-03-15 PROCEDURE — 80053 COMPREHEN METABOLIC PANEL: CPT

## 2024-03-15 PROCEDURE — 63600175 PHARM REV CODE 636 W HCPCS

## 2024-03-15 PROCEDURE — 25000003 PHARM REV CODE 250

## 2024-03-15 PROCEDURE — 21400001 HC TELEMETRY ROOM

## 2024-03-15 PROCEDURE — 36415 COLL VENOUS BLD VENIPUNCTURE: CPT

## 2024-03-15 PROCEDURE — 95714 VEEG EA 12-26 HR UNMNTR: CPT

## 2024-03-15 PROCEDURE — 25000003 PHARM REV CODE 250: Performed by: HOSPITALIST

## 2024-03-15 PROCEDURE — 95720 EEG PHY/QHP EA INCR W/VEEG: CPT | Mod: ,,, | Performed by: STUDENT IN AN ORGANIZED HEALTH CARE EDUCATION/TRAINING PROGRAM

## 2024-03-15 PROCEDURE — 27000207 HC ISOLATION

## 2024-03-15 RX ORDER — ONDANSETRON HYDROCHLORIDE 2 MG/ML
4 INJECTION, SOLUTION INTRAVENOUS ONCE
Status: COMPLETED | OUTPATIENT
Start: 2024-03-15 | End: 2024-03-15

## 2024-03-15 RX ORDER — OLANZAPINE 10 MG/2ML
5 INJECTION, POWDER, FOR SOLUTION INTRAMUSCULAR 3 TIMES DAILY PRN
Status: DISCONTINUED | OUTPATIENT
Start: 2024-03-15 | End: 2024-03-19 | Stop reason: HOSPADM

## 2024-03-15 RX ORDER — ONDANSETRON HYDROCHLORIDE 2 MG/ML
4 INJECTION, SOLUTION INTRAVENOUS ONCE
Status: DISCONTINUED | OUTPATIENT
Start: 2024-03-15 | End: 2024-03-15

## 2024-03-15 RX ADMIN — Medication 6 MG: at 09:03

## 2024-03-15 RX ADMIN — SENNOSIDES AND DOCUSATE SODIUM 2 TABLET: 8.6; 5 TABLET ORAL at 08:03

## 2024-03-15 RX ADMIN — CARBAMAZEPINE 200 MG: 200 TABLET ORAL at 08:03

## 2024-03-15 RX ADMIN — ASPIRIN 81 MG CHEWABLE TABLET 81 MG: 81 TABLET CHEWABLE at 08:03

## 2024-03-15 RX ADMIN — VENLAFAXINE HYDROCHLORIDE 75 MG: 75 CAPSULE, EXTENDED RELEASE ORAL at 08:03

## 2024-03-15 RX ADMIN — NIFEDIPINE 60 MG: 60 TABLET, FILM COATED, EXTENDED RELEASE ORAL at 08:03

## 2024-03-15 RX ADMIN — ENOXAPARIN SODIUM 40 MG: 40 INJECTION SUBCUTANEOUS at 05:03

## 2024-03-15 RX ADMIN — FOLIC ACID 1 MG: 1 TABLET ORAL at 08:03

## 2024-03-15 RX ADMIN — POLYETHYLENE GLYCOL 3350 17 G: 17 POWDER, FOR SOLUTION ORAL at 09:03

## 2024-03-15 RX ADMIN — ONDANSETRON 4 MG: 2 INJECTION INTRAMUSCULAR; INTRAVENOUS at 09:03

## 2024-03-15 RX ADMIN — CARBAMAZEPINE 400 MG: 200 TABLET ORAL at 09:03

## 2024-03-15 RX ADMIN — SENNOSIDES AND DOCUSATE SODIUM 2 TABLET: 8.6; 5 TABLET ORAL at 09:03

## 2024-03-15 RX ADMIN — LOSARTAN POTASSIUM 100 MG: 50 TABLET, FILM COATED ORAL at 08:03

## 2024-03-15 RX ADMIN — INSULIN DETEMIR 8 UNITS: 100 INJECTION, SOLUTION SUBCUTANEOUS at 08:03

## 2024-03-15 RX ADMIN — INSULIN ASPART 2 UNITS: 100 INJECTION, SOLUTION INTRAVENOUS; SUBCUTANEOUS at 01:03

## 2024-03-15 RX ADMIN — INSULIN ASPART 5 UNITS: 100 INJECTION, SOLUTION INTRAVENOUS; SUBCUTANEOUS at 01:03

## 2024-03-15 RX ADMIN — INSULIN ASPART 3 UNITS: 100 INJECTION, SOLUTION INTRAVENOUS; SUBCUTANEOUS at 08:03

## 2024-03-15 RX ADMIN — ATORVASTATIN CALCIUM 20 MG: 20 TABLET, FILM COATED ORAL at 08:03

## 2024-03-15 RX ADMIN — POLYETHYLENE GLYCOL 3350 17 G: 17 POWDER, FOR SOLUTION ORAL at 08:03

## 2024-03-15 RX ADMIN — INSULIN ASPART 5 UNITS: 100 INJECTION, SOLUTION INTRAVENOUS; SUBCUTANEOUS at 08:03

## 2024-03-15 RX ADMIN — Medication 100 MG: at 08:03

## 2024-03-15 RX ADMIN — THERA TABS 1 TABLET: TAB at 08:03

## 2024-03-15 NOTE — SUBJECTIVE & OBJECTIVE
Interval History: mentation slowly improving. Optimizing insulin management. Pending EEG results.     Review of Systems   Unable to perform ROS: Psychiatric disorder     Objective:     Vital Signs (Most Recent):  Temp: 96.9 °F (36.1 °C) (03/15/24 1100)  Pulse: 84 (03/15/24 1143)  Resp: 16 (03/15/24 1100)  BP: 114/69 (03/15/24 1100)  SpO2: (!) 93 % (03/15/24 1100) Vital Signs (24h Range):  Temp:  [96.9 °F (36.1 °C)-98 °F (36.7 °C)] 96.9 °F (36.1 °C)  Pulse:  [84-92] 84  Resp:  [16-18] 16  SpO2:  [93 %-95 %] 93 %  BP: (114-156)/(69-94) 114/69     Weight: 63.5 kg (140 lb)  Body mass index is 18.99 kg/m².    Intake/Output Summary (Last 24 hours) at 3/15/2024 1338  Last data filed at 3/15/2024 0804  Gross per 24 hour   Intake 180 ml   Output 350 ml   Net -170 ml         Physical Exam  Constitutional:       Comments: Disheveled    HENT:      Head: Normocephalic.   Eyes:      General: No scleral icterus.     Extraocular Movements: Extraocular movements intact.   Cardiovascular:      Rate and Rhythm: Normal rate and regular rhythm.   Pulmonary:      Effort: No respiratory distress.      Breath sounds: Normal breath sounds. No wheezing or rales.   Abdominal:      General: There is no distension.      Palpations: Abdomen is soft.   Musculoskeletal:      Cervical back: Normal range of motion.   Skin:     General: Skin is warm and dry.      Findings: No rash.   Neurological:      Mental Status: He is alert.   Psychiatric:         Mood and Affect: Affect is labile.         Speech: Speech is delayed.         Behavior: Behavior is slowed.         Cognition and Memory: Cognition is impaired.      Comments: Oriented to person and place.             Significant Labs: All pertinent labs within the past 24 hours have been reviewed.    Significant Imaging: I have reviewed all pertinent imaging results/findings within the past 24 hours.

## 2024-03-15 NOTE — ASSESSMENT & PLAN NOTE
Patient's FSGs are uncontrolled due to hyperglycemia on current medication regimen.  Last A1c reviewed-   Lab Results   Component Value Date    HGBA1C 10.1 (H) 03/07/2024     Most recent fingerstick glucose reviewed-   Recent Labs   Lab 03/14/24  1548 03/14/24  2039 03/15/24  0742 03/15/24  1116   POCTGLUCOSE 168* 127* 267* 224*       Current correctional scale  Low    Antihyperglycemics (From admission, onward)      Start     Stop Route Frequency Ordered    03/15/24 2100  insulin detemir U-100 (Levemir) pen 10 Units         -- SubQ 2 times daily 03/15/24 1333    03/11/24 1130  insulin aspart U-100 pen 5 Units         -- SubQ 3 times daily with meals 03/11/24 0921    03/07/24 1649  insulin aspart U-100 pen 0-5 Units         -- SubQ Before meals & nightly PRN 03/07/24 1550            -- Hold oral hyperglycemics  -- SSI, POCT BG qACHS; titrate basal/bolus regimen PRN to 140-180 goal  -- Diabetic diet  - Detemir 10U BID / Aspart 5U TIDWM + LDSSI   - Current A1C 10.1. Reviewed previous A1C on 11.3  - patient non-compliant with insulin regimen at home

## 2024-03-15 NOTE — PROGRESS NOTES
"CONSULTATION LIAISON PSYCHIATRY PROGRESS NOTE    Patient Name: Cali Mabry  MRN: [id number]  Patient Class: IP- Inpatient  Admission Date: 3/4/2024  Attending Physician: Doroteo Vyas MD      SUBJECTIVE:   Cali Mabry is a 58 y.o. 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 UDS negative, Carbamazepine level undetectable. PEC placed in ED due to combative behavior and confusion. Patient was found to be COVID+ and have UTI. He was admitted to the hospital for Acute encephalopathy. Patient received Zyprexa 5 mg IM on 3/5 @ 1539.     Psychiatry consulted for "patient with history of Bipolar and substance use disorder, currently PEC'd. Consult for psych evaluation and need for placement in psych facility?"     Patient did not receive any psychotropic PRN medications overnight. Compliant with PO medications. /88 this morning. TSH 0.363, T4 0.94. RPR non-reactive.    Today, patient seen at beside with EEG in place. He is awake with eyes opened but gaze is fixed and blinking is infrequent. He is able to track with his eyes until midline but unable to track past midline. He verbally responds to questions appropriately initially then becomes perseverative towards the end of the interview. He is oriented to person and place (hospital). He does not follow commands to squeeze the interviewer's fingers. Attempted physical exam. Patient remains rigid in BUE L>R and there is some weakness noted on plantarflexion of the L > R foot. Otherwise, patient is unable to cooperate with exam.    OBJECTIVE:    Mental Status Exam:  General Appearance: dressed in hospital garb, lying in bed, thin and gaunt  Behavior: minimal responses, does not " "participate  Involuntary Movements and Motor Activity: no abnormal involuntary movements noted  Gait and Station: unable to assess - patient lying down or seated  Speech and Language: normal rate, normal rhythm, normal volume, normal tone, normal pitch, fluent English  Mood: "okay"  Affect: flat  Thought Process and Associations: perseverative  Thought Content and Perceptions:: Unable to Assess  Sensorium and Orientation: oriented to person and place  Recent and Remote Memory: Unable to  Formally Assess  Attention and Concentration: Unable to Formally Assess  Fund of Knowledge: Unable to Formally Assess  Insight: poor, poor awareness of illness  Judgment: impaired due to delirium, behavior is inappropriate to the circumstances, noncompliant with health provider's recommendations and instructions    CAM ICU positive? Unable to assess    AIMS: Score 0/36  Abnormal Involuntary Movement Scale  0-4   Muscles of Facial Expression  0   Lips and Perioral Area  0   Jaw  0   Tongue  0   Upper (arms, wrists, hands, fingers)  0    Lower (legs, knees, ankles, toes)  0   Neck, shoulders, hips  0   Severity of abnormal movements (highest score from questions above)  0    Incapacitation due to abnormal movements  0    Patient's awareness of abnormal movements (rate only patient's report)  0   Current problems with teeth and/or dentures?  No    Does patient usually wear dentures?  No           ASSESSMENT & RECOMMENDATIONS   Acute Encephalopathy              R/O NPH  Intermittent Explosive Disorder  Hx TBI  Hx CVA  Hx Unspecified Neurocognitive Disorder  Hx Bipolar Disorder  Hx MDD        Intermittent Explosive Disorder  Hx Bipolar Disorder  Hx MDD  PSYCH MEDICATIONS  Scheduled -  Continue to HOLD Risperdal 1 mg PO BID at this time  Continue to HOLD  Zyprexa (Zydis) 5 mg PO nightly at this time  Continue Tegretol home dose 200 mg daily and 400 mg nightly  Continue Effexor XR 75 mg PO daily  PRN  Continue Zyprexa 5 mg PO or IM for " non-redirectable agitation associated with breakthrough psychosis or vero     DELIRIUM  Recommend EEG - currently in process, will follow-up results   Recommend CMP daily  Agree with B12, TSH, RPR  RPR pending  T4 pending  Recommended Neurology Consult for NPH R/O   Given acute mental status change, gait instability per collateral ~2-3 months, possible urinary incontinence and CT Head results from 3/4 with ventriculomegaly out of proportion to the degree of sulcal enlargement  Per Neuro, no interventions or investigations to be initiated in the inpatient setting.  DELIRIUM BEHAVIOR MANAGEMENT  PLEASE utilize CHEMICAL restraints with PRN meds first for agitation. Minimize use of PHYSICAL restraints OR have periods of being out of physical restraints if possible.  Keep window shades open and room lit during day and room dim at night in order to promote normal sleep-wake cycles  Encourage family at bedside. [place] patient often to situation, location, date.  Continue to Limit or Discontinue use of Narcotics, Benzos and Anti-cholinergic medications as they may worsen delirium.  Continue medical workup for causative etiology of Delirium.      RISK ASSESSMENT  NEEDS CEC because patient is in imminent danger of hurting self or others and is gravely disabled. & NEEDS 1:1 sitter     FOLLOW UP  Will follow up while in house     DISPOSITION - once medically cleared:   Will reassess daily, warrants continued medical work-up at this time    Please contact ON CALL psychiatry service (24/7) for any acute issues that may arise.    Dr. Martha Storey MD  [facility] PGY-II    Psychiatry  [facility]  3/15/2024 1:05 PM        --------------------------------------------------------------------------------------------------------------------------------------------------------------------------------------------------------------------------------------    CONTINUED OBJECTIVE clinical data & findings  reviewed and noted for above decision making    Current Medications:   Scheduled Meds:    aspirin  81 mg Oral Daily    atorvastatin  20 mg Oral Daily    carBAMazepine  200 mg Oral Daily    carBAMazepine  400 mg Oral QHS    enoxparin  40 mg Subcutaneous Daily    folic acid  1 mg Oral Daily    insulin aspart U-100  5 Units Subcutaneous TIDWM    insulin detemir U-100 (Levemir)  8 Units Subcutaneous BID    losartan  100 mg Oral Daily    melatonin  6 mg Oral Nightly    multivitamin  1 tablet Oral Daily    NIFEdipine  60 mg Oral Daily    polyethylene glycol  17 g Oral Q6H WAKE    senna-docusate 8.6-50 mg  2 tablet Oral BID    thiamine  100 mg Oral Daily    venlafaxine  75 mg Oral Daily     PRN Meds: albuterol, benzonatate, dextromethorphan-guaiFENesin  mg/5 ml, dextrose 10%, dextrose 10%, glucagon (human recombinant), glucose, glucose, insulin aspart U-100, labetalol, naloxone, OLANZapine, ondansetron, sodium chloride 0.9%    Allergies:   Review of patient's allergies indicates:  No Known Allergies    Vitals  Vitals:    03/15/24 0840   BP: (!) 155/80   Pulse:    Resp:    Temp:        Labs/Imaging/Studies:  Recent Results (from the past 24 hour(s))   POCT glucose    Collection Time: 03/14/24 11:01 AM   Result Value Ref Range    POCT Glucose 129 (H) 70 - 110 mg/dL   POCT glucose    Collection Time: 03/14/24  3:48 PM   Result Value Ref Range    POCT Glucose 168 (H) 70 - 110 mg/dL   Basic Metabolic Panel    Collection Time: 03/14/24  5:34 PM   Result Value Ref Range    Sodium 134 (L) 136 - 145 mmol/L    Potassium 4.3 3.5 - 5.1 mmol/L    Chloride 102 95 - 110 mmol/L    CO2 23 23 - 29 mmol/L    Glucose 148 (H) 70 - 110 mg/dL    BUN 20 6 - 20 mg/dL    Creatinine 1.3 0.5 - 1.4 mg/dL    Calcium 9.7 8.7 - 10.5 mg/dL    Anion Gap 9 8 - 16 mmol/L    eGFR >60.0 >60 mL/min/1.73 m^2   Comprehensive metabolic panel    Collection Time: 03/14/24  5:34 PM   Result Value Ref Range    Sodium 134 (L) 136 - 145 mmol/L    Potassium 4.3 3.5  - 5.1 mmol/L    Chloride 102 95 - 110 mmol/L    CO2 23 23 - 29 mmol/L    Glucose 148 (H) 70 - 110 mg/dL    BUN 20 6 - 20 mg/dL    Creatinine 1.3 0.5 - 1.4 mg/dL    Calcium 9.7 8.7 - 10.5 mg/dL    Total Protein 7.9 6.0 - 8.4 g/dL    Albumin 3.3 (L) 3.5 - 5.2 g/dL    Total Bilirubin 0.3 0.1 - 1.0 mg/dL    Alkaline Phosphatase 114 55 - 135 U/L    AST 23 10 - 40 U/L    ALT 28 10 - 44 U/L    eGFR >60.0 >60 mL/min/1.73 m^2    Anion Gap 9 8 - 16 mmol/L   T4, free    Collection Time: 03/14/24  5:34 PM   Result Value Ref Range    Free T4 0.94 0.71 - 1.51 ng/dL   POCT glucose    Collection Time: 03/14/24  8:39 PM   Result Value Ref Range    POCT Glucose 127 (H) 70 - 110 mg/dL   Basic Metabolic Panel    Collection Time: 03/15/24  4:19 AM   Result Value Ref Range    Sodium 135 (L) 136 - 145 mmol/L    Potassium 4.8 3.5 - 5.1 mmol/L    Chloride 101 95 - 110 mmol/L    CO2 21 (L) 23 - 29 mmol/L    Glucose 266 (H) 70 - 110 mg/dL    BUN 26 (H) 6 - 20 mg/dL    Creatinine 1.3 0.5 - 1.4 mg/dL    Calcium 9.5 8.7 - 10.5 mg/dL    Anion Gap 13 8 - 16 mmol/L    eGFR >60.0 >60 mL/min/1.73 m^2   Comprehensive metabolic panel    Collection Time: 03/15/24  4:19 AM   Result Value Ref Range    Sodium 135 (L) 136 - 145 mmol/L    Potassium 4.8 3.5 - 5.1 mmol/L    Chloride 101 95 - 110 mmol/L    CO2 21 (L) 23 - 29 mmol/L    Glucose 266 (H) 70 - 110 mg/dL    BUN 26 (H) 6 - 20 mg/dL    Creatinine 1.3 0.5 - 1.4 mg/dL    Calcium 9.5 8.7 - 10.5 mg/dL    Total Protein 7.3 6.0 - 8.4 g/dL    Albumin 3.3 (L) 3.5 - 5.2 g/dL    Total Bilirubin 0.3 0.1 - 1.0 mg/dL    Alkaline Phosphatase 113 55 - 135 U/L    AST 22 10 - 40 U/L    ALT 28 10 - 44 U/L    eGFR >60.0 >60 mL/min/1.73 m^2    Anion Gap 13 8 - 16 mmol/L   POCT glucose    Collection Time: 03/15/24  7:42 AM   Result Value Ref Range    POCT Glucose 267 (H) 70 - 110 mg/dL     Imaging Results              CT Head Without Contrast (Final result)  Result time 03/04/24 16:55:45      Final result by Jered Cheatham  MD JACKLYN (03/04/24 16:55:45)                   Impression:      No evidence of acute hemorrhage or major vascular distribution infarct.    Chronic small vessel ischemic change with remote right occipital and left cerebellar infarcts.    Ventriculomegaly out of proportion to the degree of sulcal enlargement, similar to prior exams.  While this could relate to cerebral volume loss, the configuration does raise the possibility of normal pressure hydrocephalus. Clinical correlation required.      Electronically signed by: Jered Cheatham MD  Date:    03/04/2024  Time:    16:55               Narrative:    EXAMINATION:  CT HEAD WITHOUT CONTRAST    CLINICAL HISTORY:  Mental status change, unknown cause;    TECHNIQUE:  Low dose axial CT images obtained throughout the head without the use of intravenous contrast.  Axial, sagittal and coronal reconstructions were performed.    COMPARISON:  12/10/2023.    FINDINGS:  Intracranial compartment:    Ventricles are enlarged, similar to prior.  Third ventricle diameter again measuring up to 1.4 cm.  Ventricular enlargement out of proportion of degree of sulcal enlargement.    Chronic small vessel ischemic change in the supratentorial white matter.  Remote right occipital infarct.  Remote left cerebellar infarct.  No acute major vascular distribution infarct.  No acute parenchymal hemorrhage.  No new intracranial mass effect or midline shift.    No extra-axial blood or fluid collections.    Skull/extracranial contents (limited evaluation):    No displaced calvarial fracture.    Mastoid air cells are clear. Mild patchy mucosal thickening in the visualized paranasal sinuses.                                       CT Abdomen Pelvis With IV Contrast NO Oral Contrast (Final result)  Result time 03/04/24 18:05:52      Final result by New Falcon MD (03/04/24 18:05:52)                   Impression:      Moderate colonic stool burden with mild wall thickening of the rectum, correlate with signs  of constipation or proctitis.    Diffuse bladder wall thickening, correlate with urinalysis and symptoms of cystitis.    Diverticulosis coli without evidence of acute diverticulitis    Stable 5 mm right middle lobe solid pulmonary micronodule.    Additional findings as above.    Electronically signed by resident: Marsha Cardona  Date:    03/04/2024  Time:    17:27    Electronically signed by: New Falcon MD  Date:    03/04/2024  Time:    18:05               Narrative:    EXAMINATION:  CT ABDOMEN PELVIS WITH IV CONTRAST    CLINICAL HISTORY:  Abdominal abscess/infection suspected;    TECHNIQUE:  Axial images of the abdomen and pelvis were acquired after administration of 75 cc Omnipaque 350 IV contrast. No oral contrast was administered.  Coronal and sagittal reconstructions were also obtained.    COMPARISON:  CT 12/12/2023 and 07/26/2023.    FINDINGS:  Examination degraded by patient motion artifact.    Heart: Normal heart size.  No pericardial fluid.  No significant calcific coronary atherosclerosis.    Lungs: Evaluation of the lung bases limited due to respiratory motion.  Allowing for this, stable linear bandlike opacities of the right lung base suggestive of atelectasis/scarring.  Stable 5 mm right middle lobe solid pulmonary micronodule (2-7).  No new focal consolidation.  No pleural effusion or pneumothorax.    Liver: Normal in size and contour.  No focal hepatic lesion.    Gallbladder: No calcified gallstones.    Bile Ducts: No biliary ductal dilatation.    Pancreas: No mass or peripancreatic fat stranding.    Spleen: Spleen is upper limits of normal in size measuring 12 cm.    Adrenals: No significant abnormalities.    Kidneys/Ureters: Normal in size and location. Normal enhancement. No hydronephrosis or nephrolithiasis. No ureteral dilatation.    Bladder: Diffuse bladder wall thickening.    Reproductive organs: Prostate is unremarkable.    Peritoneum: No free air or free fluid.    Retroperitoneum: No  pathologically enlarged retroperitoneal lymph nodes.    Bowel/Mesentery: Small hiatal hernia.  Stomach is otherwise unremarkable.  Small bowel is normal in caliber without evidence of inflammation or obstruction.  Moderate colonic stool burden.  Mild wall thickening of the rectum with large fecal burden, component of proctitis to be considered.  Scattered colonic diverticuli.  Appendix not definitively visualized.  No steve-cecal soft tissue stranding.    Abdominal wall:  Small fat containing umbilical hernia. 4.7 x 1.8 cm fat containing lesion along the left anterolateral chest wall, favored to represent a lipoma.    Vasculature: Mild calcific atherosclerosis of the abdominal aorta and its branches.  No aneurysm.    Bones: Mild degenerative changes spine.  Prominent Schmorl's node at L1, unchanged.  Healing left anterolateral rib fractures.  No acute fractures.  Lucent lesion within the right femoral neck, stable dating back to multiple priors.                                       X-Ray Chest AP Portable (Final result)  Result time 03/04/24 13:37:35      Final result by Cullen Bush MD (03/04/24 13:37:35)                   Impression:      See above      Electronically signed by: Cullen Bush MD  Date:    03/04/2024  Time:    13:37               Narrative:    EXAMINATION:  XR CHEST AP PORTABLE    CLINICAL HISTORY:  Sepsis;    TECHNIQUE:  Single frontal view of the chest was performed.    COMPARISON:  None 12/22/2023    FINDINGS:  Heart size normal.  The lungs are clear.  No pleural effusion

## 2024-03-15 NOTE — ASSESSMENT & PLAN NOTE
Chronic, controlled. Latest blood pressure and vitals reviewed-     Temp:  [96.9 °F (36.1 °C)-98 °F (36.7 °C)]   Pulse:  [84-92]   Resp:  [16-18]   BP: (114-156)/(69-94)   SpO2:  [93 %-95 %] .   Home meds for hypertension were reviewed and noted below.   Hypertension Medications               losartan (COZAAR) 50 MG tablet Take 1 tablet (50 mg total) by mouth once daily.    metoprolol succinate (TOPROL-XL) 25 MG 24 hr tablet Take 1 tablet (25 mg total) by mouth once daily.          - on losartan 50 and nifedipine 60 as IP   - PRN labetalol

## 2024-03-15 NOTE — PROGRESS NOTES
[facility] Medicine  Progress Note    Patient Name: Cali Mabry  MRN: [id number]  Patient Class: IP- Inpatient   Admission Date: 3/4/2024  Length of Stay: 11 days  Attending Physician: Doroteo Vyas MD  Primary Care Provider: Lawanda Flores NP        Subjective:     Principal Problem:Acute encephalopathy        HPI:  58 M with DM1, extensive IVDU, HCV Ab+, HTN, previous CVA with R sided deficits, HFpEF, CAD, chronic pancreatitis, mixed mood / psychiatric disorder presenting to [facility] ED with chief complaint of weakness of unspecified duration and onset. Admits to using shooting methamphetamines. Rest of history from patient is non-contributory due to uncooperation and alternating agitation and somnolence. HPI is written with review of previous medical records, ED records, discussed with patient's mother via telephone, who was able to provide limited insight into the patient's acute medical condition. Patient's mother states that patient's PO intake has decreased but he would not cooperate with her nor would he tell her what was bothering him. Patient reported to want to stay in bed for the last 2 days.     Of note, patient was recently discharged from [facility] following treatment of DKA and UTI (largely pan-sensitive Proteus mirabilis). In ED, patient was extremely agitated, given multiple doses of Ativan due to agitation, aggressive/abusive statements towards nursing and medical staff. Tachycardic to 110s, hypertensive, febrile to 101.5. Given broad spectrum Abx (Vanc/Zosyn). PEC'ed by ED due to extreme agitation.    Overview/Hospital Course:  Patient admitted for altered mental status. Suspect it was secondary to UTI and COVID infection. UDS negative. Patient was PEC'd while in the ED for agitation. Patients mental status improving. Will consult psychiatry if patient needs psych placement. Finished remdesivir course, not hypoxic. Improving respiratory status. Minor episodes of cough.  Reviewed previous A1C from 2 months on 11.3. uptitrated insulin regimen. Psychiatry following, patient with intermittent explosive disorder, and recommend maintaining current regimen, as well as PEC as he is confused. Current presentation consistent with encephalopathy rather than psychosis or vero. Psych not recommending Psych placement. Once he is back to baseline, they will reassess need for psych placement. Titrating blood glucose. Continues on PO cefpodoxime. Neurology consulted for NPH, recommending outpatient neurosurgery evaluation. Episode of hypertension, controlled wit PRN labetalol. Started on PO amlodipine for BP control. Decreased risperidone dose as per psychiatry recs given patient's tone is increased on examination. Hypoglycemic overnight, started on D10 bolus, improved. Will reach out again to family for new collaterals on patient baseline status. Somnolent on examination. Optimizing blood glucose management, PRN zyprexa for agitation only. Increased tegretol dose to 200 AM, 400PM as per psychiatry recommendations. Continue holding risperidone. Patient somnolent. No new episodes of agitation for more than 48hs. Optimizing BG. Placed on EEG. Will follow official results.     Interval History: mentation slowly improving. Optimizing insulin management. Pending EEG results.     Review of Systems   Unable to perform ROS: Psychiatric disorder     Objective:     Vital Signs (Most Recent):  Temp: 96.9 °F (36.1 °C) (03/15/24 1100)  Pulse: 84 (03/15/24 1143)  Resp: 16 (03/15/24 1100)  BP: 114/69 (03/15/24 1100)  SpO2: (!) 93 % (03/15/24 1100) Vital Signs (24h Range):  Temp:  [96.9 °F (36.1 °C)-98 °F (36.7 °C)] 96.9 °F (36.1 °C)  Pulse:  [84-92] 84  Resp:  [16-18] 16  SpO2:  [93 %-95 %] 93 %  BP: (114-156)/(69-94) 114/69     Weight: 63.5 kg (140 lb)  Body mass index is 18.99 kg/m².    Intake/Output Summary (Last 24 hours) at 3/15/2024 1338  Last data filed at 3/15/2024 0804  Gross per 24 hour   Intake 180 ml    Output 350 ml   Net -170 ml         Physical Exam  Constitutional:       Comments: Disheveled    HENT:      Head: Normocephalic.   Eyes:      General: No scleral icterus.     Extraocular Movements: Extraocular movements intact.   Cardiovascular:      Rate and Rhythm: Normal rate and regular rhythm.   Pulmonary:      Effort: No respiratory distress.      Breath sounds: Normal breath sounds. No wheezing or rales.   Abdominal:      General: There is no distension.      Palpations: Abdomen is soft.   Musculoskeletal:      Cervical back: Normal range of motion.   Skin:     General: Skin is warm and dry.      Findings: No rash.   Neurological:      Mental Status: He is alert.   Psychiatric:         Mood and Affect: Affect is labile.         Speech: Speech is delayed.         Behavior: Behavior is slowed.         Cognition and Memory: Cognition is impaired.      Comments: Oriented to person and place.             Significant Labs: All pertinent labs within the past 24 hours have been reviewed.    Significant Imaging: I have reviewed all pertinent imaging results/findings within the past 24 hours.    Assessment/Plan:      * Acute encephalopathy  Likely 2/2 to drug use, but underlying COVID+ infection and possible UTI may be contributing factors. Utox negative, but reported history of recent methamphetamine use.     -- Patient improving on mental status   -- PEC'ed patient prior to admission to .   -- Capacity evaluation QD  -- PRN Zyprexa for non-redirectable agitation / violence  -- Treat underlying infection  -- See Bipolar Disorder A/P for home medications  - Negative encephalopathy workup  - Psychiatry following, at the moment not a candidate for IP psych placement given persistent delirium   - High risk of seizures, given prior history of stroke. Follow EEG results     Constipation  CTAP wc s/o moderate stool burden, diverticula w/o infection. Negative KUB.    - optimizing bowel regimen.       COVID  Patient  presenting with incidental COVID+ 03/04/2024. No symptoms of dyspnea, shortness of breath. Presenting for unspecified weakness. No hypoxemia.  COVID-19 testing:   Isolation: Airborne/Droplet. Surgical mask on patient. Notify Infection Control. Finished remdesivir management. No steroids given not hypoxemic.     -- No O2 requirement at this time  -- COVID risk score: 5; will initiate High-Risk Remdesivir 3 day course  -- No role for Dexamethasone d/t normal SpO2 on RA  -- No role for CAP coverage d/t lack of focal consolidation on CXR  -- No role for therapeutic AC d/t normal SpO2 on RA  -- Acetaminophen 650mg PO Q6hr PRN fever/headache  -- IVF if indicated, restrictive strategy preferred, no maintenance IV if able  -- antitussives: dextromethorphan/guaifenesin, tessalon PRN  -- albuterol: 2-4 puff q6h if patient wheezing    Acute cystitis without hematuria  -Resolved.     Bipolar disorder  -- Continue home medication regimen:  -venlafaxine  - carbamazepine 200 AM, 400 PM   - hold risperidone      Intermittent explosive disorder  Psychiatry following, patient with intermittent explosive disorder, and recommend maintaining current regimen, as well as PEC as he is confused. Current presentation consistent with encephalopathy rather than psychosis or vero. Psych not recommending Psych placement. Once he is back to baseline, they will reassess need for psych placement.       Type 1 diabetes mellitus with hyperglycemia  Patient's FSGs are uncontrolled due to hyperglycemia on current medication regimen.  Last A1c reviewed-   Lab Results   Component Value Date    HGBA1C 10.1 (H) 03/07/2024     Most recent fingerstick glucose reviewed-   Recent Labs   Lab 03/14/24  1548 03/14/24  2039 03/15/24  0742 03/15/24  1116   POCTGLUCOSE 168* 127* 267* 224*       Current correctional scale  Low    Antihyperglycemics (From admission, onward)      Start     Stop Route Frequency Ordered    03/15/24 2100  insulin detemir U-100 (Levemir)  pen 10 Units         -- SubQ 2 times daily 03/15/24 1333    03/11/24 1130  insulin aspart U-100 pen 5 Units         -- SubQ 3 times daily with meals 03/11/24 0921    03/07/24 1649  insulin aspart U-100 pen 0-5 Units         -- SubQ Before meals & nightly PRN 03/07/24 1550            -- Hold oral hyperglycemics  -- SSI, POCT BG qACHS; titrate basal/bolus regimen PRN to 140-180 goal  -- Diabetic diet  - Detemir 10U BID / Aspart 5U TIDWM + LDSSI   - Current A1C 10.1. Reviewed previous A1C on 11.3  - patient non-compliant with insulin regimen at home     CAD (coronary artery disease)  Hyperlipidemia    Known CAD. Patient not exhibiting ACS symptoms at this time.     Last known LHC done 09/2023:  1.  60% in stent restenosis of previously placed right coronary artery stent.  2.  Successful balloon angioplasty with a 3 0 x 20 fall by 3.5 x 20 noncompliant balloon.  60% stenosis reduced to 0%.  YENI 3 flow.  No dissection.  3.  50% stenosis noted in the mid and distal left anterior descending artery.  There is a 70 % stenosis at the apex, small caliber.    -- Continue HI Statin  -- Continue ASA 81  -- Hold Plavix as patient's > 1 year from last PCI    COPD (chronic obstructive pulmonary disease)  Patient's COPD is controlled currently.  Patient is currently off COPD Pathway.     -PRN inhalers    Sepsis  Currently not having sepsis criteria. Positive nitrates on admission. Finished AB management.     Polysubstance use disorder  History of recent methamphetamine, tobacco, alcohol use. Likely contributor to psychiatric disorder and agitation      Hemiparesis affecting right side as late effect of stroke  Noted per history    Anemia of chronic disease  Patient's anemia is currently controlled. Has not received any PRBCs to date. Etiology likely d/t chronic disease due to recurrent infections, inflammation  Current CBC reviewed-   Lab Results   Component Value Date    HGB 12.6 (L) 03/13/2024    HCT 41.3 03/13/2024     Monitor  serial CBC and transfuse if patient becomes hemodynamically unstable, symptomatic or H/H drops below 7/21.    Primary hypertension  Chronic, controlled. Latest blood pressure and vitals reviewed-     Temp:  [96.9 °F (36.1 °C)-98 °F (36.7 °C)]   Pulse:  [84-92]   Resp:  [16-18]   BP: (114-156)/(69-94)   SpO2:  [93 %-95 %] .   Home meds for hypertension were reviewed and noted below.   Hypertension Medications               losartan (COZAAR) 50 MG tablet Take 1 tablet (50 mg total) by mouth once daily.    metoprolol succinate (TOPROL-XL) 25 MG 24 hr tablet Take 1 tablet (25 mg total) by mouth once daily.          - on losartan 50 and nifedipine 60 as IP   - PRN labetalol       VTE Risk Mitigation (From admission, onward)           Ordered     enoxaparin injection 40 mg  Daily         03/04/24 1904     IP VTE HIGH RISK PATIENT  Once         03/04/24 1904     Place sequential compression device  Until discontinued         03/04/24 1904                    Discharge Planning   ROLA: 3/16/2024     Code Status: Full Code   Is the patient medically ready for discharge?: No    Reason for patient still in hospital (select all that apply): Patient trending condition  Discharge Plan A: Psychiatric hospital   Discharge Delays: None known at this time      Kaleb Monson MD  Department of Hospital Medicine   [facility]

## 2024-03-15 NOTE — ASSESSMENT & PLAN NOTE
Likely 2/2 to drug use, but underlying COVID+ infection and possible UTI may be contributing factors. Utox negative, but reported history of recent methamphetamine use.     -- Patient improving on mental status   -- PEC'ed patient prior to admission to .   -- Capacity evaluation QD  -- PRN Zyprexa for non-redirectable agitation / violence  -- Treat underlying infection  -- See Bipolar Disorder A/P for home medications  - Negative encephalopathy workup  - Psychiatry following, at the moment not a candidate for IP psych placement given persistent delirium   - High risk of seizures, given prior history of stroke. Follow EEG results

## 2024-03-15 NOTE — PROCEDURES
[facility] EEG/VIDEO MONITORING REPORT  Cali Mabry  [id number]  1965    DATE OF SERVICE:  03/14/2024  DATE OF ADMISSION: 3/4/2024 12:06 PM    ADMITTING/REQUESTING PROVIDER: Mat Jaquez MD    REASON FOR CONSULT:  58-year-old man admitted with confusion and weakness.  Evaluate for evidence of epileptiform activity.    METHODOLOGY   Electroencephalographic (EEG) recording is with electrodes placed according to the International 10-20 placement system.  Thirty two (32) channels of digital signal (sampling rate of 512/sec) including T1 and T2 was simultaneously recorded from the scalp and may include  EKG, EMG, and/or eye monitors.  Recording band pass was 0.1 to 512 hz.  Digital video recording of the patient is simultaneously recorded with the EEG.  The patient is instructed report clinical symptoms which may occur during the recording session.  EEG and video recording is stored and archived in digital format.  Activation procedures which include photic stimulation, hyperventilation and instructing patients to perform simple task are done in selected patients.   The EEG is displayed on a monitor screen and can be reviewed using different montages.  Computer assisted analysis is employed to detect spike and electrographic seizure activity.   The entire record is submitted for computer analysis.  The entire recording is visually reviewed and the times identified by computer analysis as being spikes or seizures are reviewed again.  Compresses spectral analysis (CSA) is also performed on the activity recorded from each individual channel.  This is displayed as a power display of frequencies from 0 to 30 Hz over time.   The CSA is reviewed looking for asymmetries in power between homologous areas of the scalp and then compared with the original EEG recording.     [employer] software is also utilized in the review of this study.  This software suite analyzes the EEG recording in multiple domains.  Coherence and  rhythmicity is computed to identify EEG sections which may contain organized seizures.  Each channel undergoes analysis to detect presence of spike and sharp waves which have special and morphological characteristic of epileptic activity.  The routine EEG recording is converted from spacial into frequency domain.  This is then displayed comparing homologous areas to identify areas of significant asymmetry.  Algorithm to identify non-cortically generated artifact is used to separate eye movement, EMG and other artifact from the EEG.      RECORDING TIMES  Start on 03/14/2024 at 08:50 a.m.  Stop on 03/15/2024 at 07:00 a.m.  A total of 21 hours and 29 minutes of EEG recording is obtained.    EEG FINDINGS  Background activity:   The background is continuous and asymmetric mixed frequency theta/delta activity.  There are abundant spontaneous bilateral independent epileptiform discharges that occur more frequently over the right hemisphere.    There are no pushbutton activations.    Sleep:  The patient cycles between wakefulness and sleep    Activation procedures:   Hyperventilation is not performed  Photic stimulation is not performed    Cardiac Monitor:   Heart rate appears generally regular on a single lead EKG.    Impression:   This is an abnormal continuous EEG monitoring study because of a moderate encephalopathy with evidence of cortical irritation and a potential seizure foci bilaterally, right>left.  There are no pushbutton activations and no electrographic seizures.    Natasha Chacon MD PhD [facility]  Neurology-Epilepsy  [facility].

## 2024-03-16 PROBLEM — R94.01 ABNORMAL EEG: Status: ACTIVE | Noted: 2024-03-16

## 2024-03-16 LAB
ALBUMIN SERPL BCP-MCNC: 3.1 G/DL (ref 3.5–5.2)
ALP SERPL-CCNC: 105 U/L (ref 55–135)
ALT SERPL W/O P-5'-P-CCNC: 28 U/L (ref 10–44)
ANION GAP SERPL CALC-SCNC: 9 MMOL/L (ref 8–16)
ANION GAP SERPL CALC-SCNC: 9 MMOL/L (ref 8–16)
AST SERPL-CCNC: 20 U/L (ref 10–40)
BASOPHILS # BLD AUTO: 0.05 K/UL (ref 0–0.2)
BASOPHILS NFR BLD: 0.7 % (ref 0–1.9)
BILIRUB SERPL-MCNC: 0.3 MG/DL (ref 0.1–1)
BUN SERPL-MCNC: 31 MG/DL (ref 6–20)
BUN SERPL-MCNC: 31 MG/DL (ref 6–20)
CALCIUM SERPL-MCNC: 9.3 MG/DL (ref 8.7–10.5)
CALCIUM SERPL-MCNC: 9.3 MG/DL (ref 8.7–10.5)
CHLORIDE SERPL-SCNC: 98 MMOL/L (ref 95–110)
CHLORIDE SERPL-SCNC: 98 MMOL/L (ref 95–110)
CO2 SERPL-SCNC: 25 MMOL/L (ref 23–29)
CO2 SERPL-SCNC: 25 MMOL/L (ref 23–29)
CREAT SERPL-MCNC: 1.2 MG/DL (ref 0.5–1.4)
CREAT SERPL-MCNC: 1.2 MG/DL (ref 0.5–1.4)
DIFFERENTIAL METHOD BLD: ABNORMAL
EOSINOPHIL # BLD AUTO: 0.2 K/UL (ref 0–0.5)
EOSINOPHIL NFR BLD: 2.6 % (ref 0–8)
ERYTHROCYTE [DISTWIDTH] IN BLOOD BY AUTOMATED COUNT: 14.3 % (ref 11.5–14.5)
EST. GFR  (NO RACE VARIABLE): >60 ML/MIN/1.73 M^2
EST. GFR  (NO RACE VARIABLE): >60 ML/MIN/1.73 M^2
GLUCOSE SERPL-MCNC: 283 MG/DL (ref 70–110)
GLUCOSE SERPL-MCNC: 283 MG/DL (ref 70–110)
HCT VFR BLD AUTO: 42 % (ref 40–54)
HGB BLD-MCNC: 13.3 G/DL (ref 14–18)
IMM GRANULOCYTES # BLD AUTO: 0.06 K/UL (ref 0–0.04)
IMM GRANULOCYTES NFR BLD AUTO: 0.8 % (ref 0–0.5)
LYMPHOCYTES # BLD AUTO: 1.4 K/UL (ref 1–4.8)
LYMPHOCYTES NFR BLD: 19.1 % (ref 18–48)
MCH RBC QN AUTO: 25.2 PG (ref 27–31)
MCHC RBC AUTO-ENTMCNC: 31.7 G/DL (ref 32–36)
MCV RBC AUTO: 80 FL (ref 82–98)
MONOCYTES # BLD AUTO: 0.7 K/UL (ref 0.3–1)
MONOCYTES NFR BLD: 10.1 % (ref 4–15)
NEUTROPHILS # BLD AUTO: 4.9 K/UL (ref 1.8–7.7)
NEUTROPHILS NFR BLD: 66.7 % (ref 38–73)
NRBC BLD-RTO: 0 /100 WBC
PLATELET # BLD AUTO: 407 K/UL (ref 150–450)
PMV BLD AUTO: 9.7 FL (ref 9.2–12.9)
POCT GLUCOSE: 118 MG/DL (ref 70–110)
POCT GLUCOSE: 182 MG/DL (ref 70–110)
POCT GLUCOSE: 283 MG/DL (ref 70–110)
POCT GLUCOSE: 300 MG/DL (ref 70–110)
POTASSIUM SERPL-SCNC: 4.6 MMOL/L (ref 3.5–5.1)
POTASSIUM SERPL-SCNC: 4.6 MMOL/L (ref 3.5–5.1)
PROT SERPL-MCNC: 7.3 G/DL (ref 6–8.4)
RBC # BLD AUTO: 5.28 M/UL (ref 4.6–6.2)
SODIUM SERPL-SCNC: 132 MMOL/L (ref 136–145)
SODIUM SERPL-SCNC: 132 MMOL/L (ref 136–145)
WBC # BLD AUTO: 7.33 K/UL (ref 3.9–12.7)

## 2024-03-16 PROCEDURE — 36415 COLL VENOUS BLD VENIPUNCTURE: CPT | Mod: XB

## 2024-03-16 PROCEDURE — 99232 SBSQ HOSP IP/OBS MODERATE 35: CPT | Mod: ,,, | Performed by: PSYCHIATRY & NEUROLOGY

## 2024-03-16 PROCEDURE — 25000003 PHARM REV CODE 250

## 2024-03-16 PROCEDURE — 63600175 PHARM REV CODE 636 W HCPCS

## 2024-03-16 PROCEDURE — 94761 N-INVAS EAR/PLS OXIMETRY MLT: CPT

## 2024-03-16 PROCEDURE — 25000003 PHARM REV CODE 250: Performed by: HOSPITALIST

## 2024-03-16 PROCEDURE — 21400001 HC TELEMETRY ROOM

## 2024-03-16 PROCEDURE — 95813 EEG EXTND MNTR 61-119 MIN: CPT | Mod: 26,,, | Performed by: STUDENT IN AN ORGANIZED HEALTH CARE EDUCATION/TRAINING PROGRAM

## 2024-03-16 PROCEDURE — 25000003 PHARM REV CODE 250: Performed by: STUDENT IN AN ORGANIZED HEALTH CARE EDUCATION/TRAINING PROGRAM

## 2024-03-16 PROCEDURE — 27000207 HC ISOLATION

## 2024-03-16 PROCEDURE — 36415 COLL VENOUS BLD VENIPUNCTURE: CPT

## 2024-03-16 PROCEDURE — 80053 COMPREHEN METABOLIC PANEL: CPT

## 2024-03-16 PROCEDURE — 85025 COMPLETE CBC W/AUTO DIFF WBC: CPT

## 2024-03-16 RX ORDER — INSULIN ASPART 100 [IU]/ML
5 INJECTION, SOLUTION INTRAVENOUS; SUBCUTANEOUS
Status: DISCONTINUED | OUTPATIENT
Start: 2024-03-16 | End: 2024-03-19 | Stop reason: HOSPADM

## 2024-03-16 RX ORDER — INSULIN ASPART 100 [IU]/ML
2 INJECTION, SOLUTION INTRAVENOUS; SUBCUTANEOUS
Status: DISCONTINUED | OUTPATIENT
Start: 2024-03-16 | End: 2024-03-16

## 2024-03-16 RX ORDER — INSULIN ASPART 100 [IU]/ML
4 INJECTION, SOLUTION INTRAVENOUS; SUBCUTANEOUS
Status: DISCONTINUED | OUTPATIENT
Start: 2024-03-16 | End: 2024-03-16

## 2024-03-16 RX ADMIN — VENLAFAXINE HYDROCHLORIDE 75 MG: 75 CAPSULE, EXTENDED RELEASE ORAL at 08:03

## 2024-03-16 RX ADMIN — SENNOSIDES AND DOCUSATE SODIUM 2 TABLET: 8.6; 5 TABLET ORAL at 08:03

## 2024-03-16 RX ADMIN — ENOXAPARIN SODIUM 40 MG: 40 INJECTION SUBCUTANEOUS at 05:03

## 2024-03-16 RX ADMIN — INSULIN ASPART 4 UNITS: 100 INJECTION, SOLUTION INTRAVENOUS; SUBCUTANEOUS at 01:03

## 2024-03-16 RX ADMIN — ASPIRIN 81 MG CHEWABLE TABLET 81 MG: 81 TABLET CHEWABLE at 08:03

## 2024-03-16 RX ADMIN — CARBAMAZEPINE 200 MG: 200 TABLET ORAL at 08:03

## 2024-03-16 RX ADMIN — POLYETHYLENE GLYCOL 3350 17 G: 17 POWDER, FOR SOLUTION ORAL at 10:03

## 2024-03-16 RX ADMIN — ATORVASTATIN CALCIUM 20 MG: 20 TABLET, FILM COATED ORAL at 08:03

## 2024-03-16 RX ADMIN — CARBAMAZEPINE 400 MG: 200 TABLET ORAL at 10:03

## 2024-03-16 RX ADMIN — Medication 6 MG: at 10:03

## 2024-03-16 RX ADMIN — FOLIC ACID 1 MG: 1 TABLET ORAL at 08:03

## 2024-03-16 RX ADMIN — INSULIN DETEMIR 8 UNITS: 100 INJECTION, SOLUTION SUBCUTANEOUS at 08:03

## 2024-03-16 RX ADMIN — POLYETHYLENE GLYCOL 3350 17 G: 17 POWDER, FOR SOLUTION ORAL at 02:03

## 2024-03-16 RX ADMIN — SENNOSIDES AND DOCUSATE SODIUM 2 TABLET: 8.6; 5 TABLET ORAL at 10:03

## 2024-03-16 RX ADMIN — THERA TABS 1 TABLET: TAB at 08:03

## 2024-03-16 RX ADMIN — INSULIN ASPART 5 UNITS: 100 INJECTION, SOLUTION INTRAVENOUS; SUBCUTANEOUS at 05:03

## 2024-03-16 RX ADMIN — Medication 100 MG: at 08:03

## 2024-03-16 RX ADMIN — INSULIN ASPART 3 UNITS: 100 INJECTION, SOLUTION INTRAVENOUS; SUBCUTANEOUS at 08:03

## 2024-03-16 RX ADMIN — LOSARTAN POTASSIUM 100 MG: 50 TABLET, FILM COATED ORAL at 08:03

## 2024-03-16 RX ADMIN — POLYETHYLENE GLYCOL 3350 17 G: 17 POWDER, FOR SOLUTION ORAL at 08:03

## 2024-03-16 NOTE — ASSESSMENT & PLAN NOTE
Patient's FSGs are uncontrolled due to hyperglycemia on current medication regimen.  Last A1c reviewed-   Lab Results   Component Value Date    HGBA1C 10.1 (H) 03/07/2024     Most recent fingerstick glucose reviewed-   Recent Labs   Lab 03/15/24  1116 03/15/24  1602 03/15/24  1936 03/15/24  2057   POCTGLUCOSE 224* 111* 60* 90       Current correctional scale  Low    Antihyperglycemics (From admission, onward)      Start     Stop Route Frequency Ordered    03/16/24 1130  insulin aspart U-100 pen 4 Units         -- SubQ 3 times daily with meals 03/16/24 0724    03/16/24 0900  insulin detemir U-100 (Levemir) pen 8 Units         -- SubQ 2 times daily 03/16/24 0723    03/07/24 1649  insulin aspart U-100 pen 0-5 Units         -- SubQ Before meals & nightly PRN 03/07/24 1550            -- Hold oral hyperglycemics  -- SSI, POCT BG qACHS; titrate basal/bolus regimen PRN to 140-180 goal  -- Diabetic diet  - Detemir 8U BID / Aspart 3U TIDWM + LDSSI, hold ASPART if patient not eating his food (pocketed food yesterday per nursing staff). SLP consulted.  - Current A1C 10.1. Reviewed previous A1C on 11.3  - patient non-compliant with insulin regimen at home

## 2024-03-16 NOTE — ASSESSMENT & PLAN NOTE
Patient presenting with incidental COVID+ 03/04/2024. No symptoms of dyspnea, shortness of breath. Presenting for unspecified weakness. No hypoxemia.  COVID-19 testing:   Isolation: Airborne/Droplet. Surgical mask on patient. Notify Infection Control. Finished remdesivir management. No steroids given not hypoxemic.     -- No O2 requirement at this time  -- COVID risk score: 5; will initiate High-Risk Remdesivir 3 day course  -- No role for Dexamethasone d/t normal SpO2 on RA  -- No role for CAP coverage d/t lack of focal consolidation on CXR  -- No role for therapeutic AC d/t normal SpO2 on RA  -- Acetaminophen 650mg PO Q6hr PRN fever/headache  -- IVF if indicated, restrictive strategy preferred, no maintenance IV if able  -- antitussives: dextromethorphan/guaifenesin, tessalon PRN  -- albuterol: 2-4 puff q6h if patient wheezing    --RESOLVED

## 2024-03-16 NOTE — SUBJECTIVE & OBJECTIVE
"Interval History: Patient slowly with better mentation. Patient able to answer questions this morning but still disoriented. He thought he was in a clinic this morning. Was no aware of date/year this AM. Oriented to self. EEG cap still in place. Denies any stomach pain, has nausea yesterday. Noted by nurse to pocket food at end of shift yesterday.    Review of Systems   Unable to perform ROS: Psychiatric disorder     Objective:     Vital Signs (Most Recent):  Temp: 97.5 °F (36.4 °C) (03/16/24 0743)  Pulse: 83 (03/16/24 0743)  Resp: 20 (03/16/24 0743)  BP: 137/79 (03/16/24 0743)  SpO2: (!) 89 % (03/16/24 0743) Vital Signs (24h Range):  Temp:  [96.9 °F (36.1 °C)-98.3 °F (36.8 °C)] 97.5 °F (36.4 °C)  Pulse:  [81-95] 83  Resp:  [16-20] 20  SpO2:  [89 %-94 %] 89 %  BP: (114-137)/(68-79) 137/79     Weight: 63.5 kg (140 lb)  Body mass index is 18.99 kg/m².  No intake or output data in the 24 hours ending 03/16/24 0854      Physical Exam  Constitutional:       Comments: Disheveled    HENT:      Head: Normocephalic.   Eyes:      General: No scleral icterus.     Extraocular Movements: Extraocular movements intact.   Cardiovascular:      Rate and Rhythm: Normal rate and regular rhythm.   Pulmonary:      Effort: No respiratory distress.      Breath sounds: Normal breath sounds. No wheezing or rales.   Abdominal:      General: There is no distension.      Palpations: Abdomen is soft.   Musculoskeletal:      Cervical back: Normal range of motion.   Skin:     General: Skin is warm and dry.      Findings: No rash.   Neurological:      Mental Status: He is alert.   Psychiatric:         Mood and Affect: Affect is labile.         Speech: Speech is delayed.         Behavior: Behavior is slowed.         Cognition and Memory: Cognition is impaired.      Comments: Oriented to person and place.             Significant Labs: All pertinent labs within the past 24 hours have been reviewed.  CBC: No results for input(s): "WBC", "HGB", "HCT", " ""PLT" in the last 48 hours.  CMP:   Recent Labs   Lab 03/14/24  1734 03/15/24  0419 03/16/24  0625   *  134* 135*  135* 132*  132*   K 4.3  4.3 4.8  4.8 4.6  4.6     102 101  101 98  98   CO2 23  23 21*  21* 25  25   *  148* 266*  266* 283*  283*   BUN 20  20 26*  26* 31*  31*   CREATININE 1.3  1.3 1.3  1.3 1.2  1.2   CALCIUM 9.7  9.7 9.5  9.5 9.3  9.3   PROT 7.9 7.3 7.3   ALBUMIN 3.3* 3.3* 3.1*   BILITOT 0.3 0.3 0.3   ALKPHOS 114 113 105   AST 23 22 20   ALT 28 28 28   ANIONGAP 9  9 13  13 9  9     POCT Glucose:   Recent Labs   Lab 03/15/24  1602 03/15/24  1936 03/15/24  2057   POCTGLUCOSE 111* 60* 90       Significant Imaging: I have reviewed all pertinent imaging results/findings within the past 24 hours.  "

## 2024-03-16 NOTE — ASSESSMENT & PLAN NOTE
"Likely 2/2 to drug use, but underlying COVID+ infection and possible UTI may be contributing factors. Utox negative, but reported history of recent methamphetamine use.     -- Patient improving on mental status   -- PEC'ed patient prior to admission to .   -- Capacity evaluation QD  -- PRN Zyprexa for non-redirectable agitation / violence  -- Treat underlying infection  -- See Bipolar Disorder A/P for home medications  - Negative encephalopathy workup  - Psychiatry following, at the moment not a candidate for IP psych placement given persistent delirium   - High risk of seizures, given prior history of stroke. Follow EEG results "This is an abnormal continuous EEG monitoring study because of a moderate encephalopathy with evidence of cortical irritation and a potential seizure foci bilaterally, right>left.  There are no pushbutton activations and no electrographic seizures." Continue anti-epilleptics  "

## 2024-03-16 NOTE — ASSESSMENT & PLAN NOTE
Chronic, controlled. Latest blood pressure and vitals reviewed-     Temp:  [96.9 °F (36.1 °C)-98.3 °F (36.8 °C)]   Pulse:  [81-95]   Resp:  [16-20]   BP: (114-137)/(68-79)   SpO2:  [89 %-94 %] .   Home meds for hypertension were reviewed and noted below.   Hypertension Medications               losartan (COZAAR) 50 MG tablet Take 1 tablet (50 mg total) by mouth once daily.    metoprolol succinate (TOPROL-XL) 25 MG 24 hr tablet Take 1 tablet (25 mg total) by mouth once daily.          - on losartan 50 and nifedipine 60 as IP   - PRN labetalol

## 2024-03-16 NOTE — PLAN OF CARE
Pt admitted with acute encephalopathy, pt able to answer simple questions. Patient took night time medications, crushed in pudding without difficulty.   Pt blood sugar 60 mg/dl . Pt given juice and snacks, blood sugar recheck 90 mg/dl. Fall and safety precautions in place.

## 2024-03-16 NOTE — PROGRESS NOTES
[facility] Medicine  Progress Note    Patient Name: Cali Mabry  MRN: [id number]  Patient Class: IP- Inpatient   Admission Date: 3/4/2024  Length of Stay: 12 days  Attending Physician: Doroteo Vyas MD  Primary Care Provider: Lawanda Flores NP        Subjective:     Principal Problem:Acute encephalopathy        HPI:  58 M with DM1, extensive IVDU, HCV Ab+, HTN, previous CVA with R sided deficits, HFpEF, CAD, chronic pancreatitis, mixed mood / psychiatric disorder presenting to [facility] ED with chief complaint of weakness of unspecified duration and onset. Admits to using shooting methamphetamines. Rest of history from patient is non-contributory due to uncooperation and alternating agitation and somnolence. HPI is written with review of previous medical records, ED records, discussed with patient's mother via telephone, who was able to provide limited insight into the patient's acute medical condition. Patient's mother states that patient's PO intake has decreased but he would not cooperate with her nor would he tell her what was bothering him. Patient reported to want to stay in bed for the last 2 days.     Of note, patient was recently discharged from [facility] following treatment of DKA and UTI (largely pan-sensitive Proteus mirabilis). In ED, patient was extremely agitated, given multiple doses of Ativan due to agitation, aggressive/abusive statements towards nursing and medical staff. Tachycardic to 110s, hypertensive, febrile to 101.5. Given broad spectrum Abx (Vanc/Zosyn). PEC'ed by ED due to extreme agitation.    Overview/Hospital Course:  Patient admitted for altered mental status. Suspect it was secondary to UTI and COVID infection. UDS negative. Patient was PEC'd while in the ED for agitation. Patients mental status improving. Will consult psychiatry if patient needs psych placement. Finished remdesivir course, not hypoxic. Improving respiratory status. Minor episodes of cough.  Reviewed previous A1C from 2 months on 11.3. uptitrated insulin regimen. Psychiatry following, patient with intermittent explosive disorder, and recommend maintaining current regimen, as well as PEC as he is confused. Current presentation consistent with encephalopathy rather than psychosis or vero. Psych not recommending Psych placement. Once he is back to baseline, they will reassess need for psych placement. Titrating blood glucose. Continues on PO cefpodoxime. Neurology consulted for NPH, recommending outpatient neurosurgery evaluation. Episode of hypertension, controlled wit PRN labetalol. Started on PO amlodipine for BP control. Decreased risperidone dose as per psychiatry recs given patient's tone is increased on examination. Hypoglycemic overnight, started on D10 bolus, improved. Will reach out again to family for new collaterals on patient baseline status. Somnolent on examination. Optimizing blood glucose management, PRN zyprexa for agitation only. Increased tegretol dose to 200 AM, 400PM as per psychiatry recommendations. Continue holding risperidone. Patient somnolent. No new episodes of agitation for more than 48hs. Optimizing BG. Placed on EEG. Will follow official results.     Interval History: Patient slowly with better mentation. Patient able to answer questions this morning but still disoriented. He thought he was in a clinic this morning. Was no aware of date/year this AM. Oriented to self. EEG cap still in place. Denies any stomach pain, has nausea yesterday. Noted by nurse to pocket food at end of shift yesterday.    Review of Systems   Unable to perform ROS: Psychiatric disorder     Objective:     Vital Signs (Most Recent):  Temp: 97.5 °F (36.4 °C) (03/16/24 0743)  Pulse: 83 (03/16/24 0743)  Resp: 20 (03/16/24 0743)  BP: 137/79 (03/16/24 0743)  SpO2: (!) 89 % (03/16/24 0743) Vital Signs (24h Range):  Temp:  [96.9 °F (36.1 °C)-98.3 °F (36.8 °C)] 97.5 °F (36.4 °C)  Pulse:  [81-95] 83  Resp:   "[16-20] 20  SpO2:  [89 %-94 %] 89 %  BP: (114-137)/(68-79) 137/79     Weight: 63.5 kg (140 lb)  Body mass index is 18.99 kg/m².  No intake or output data in the 24 hours ending 03/16/24 0854      Physical Exam  Constitutional:       Comments: Disheveled    HENT:      Head: Normocephalic.   Eyes:      General: No scleral icterus.     Extraocular Movements: Extraocular movements intact.   Cardiovascular:      Rate and Rhythm: Normal rate and regular rhythm.   Pulmonary:      Effort: No respiratory distress.      Breath sounds: Normal breath sounds. No wheezing or rales.   Abdominal:      General: There is no distension.      Palpations: Abdomen is soft.   Musculoskeletal:      Cervical back: Normal range of motion.   Skin:     General: Skin is warm and dry.      Findings: No rash.   Neurological:      Mental Status: He is alert.   Psychiatric:         Mood and Affect: Affect is labile.         Speech: Speech is delayed.         Behavior: Behavior is slowed.         Cognition and Memory: Cognition is impaired.      Comments: Oriented to person and place.             Significant Labs: All pertinent labs within the past 24 hours have been reviewed.  CBC: No results for input(s): "WBC", "HGB", "HCT", "PLT" in the last 48 hours.  CMP:   Recent Labs   Lab 03/14/24  1734 03/15/24  0419 03/16/24  0625   *  134* 135*  135* 132*  132*   K 4.3  4.3 4.8  4.8 4.6  4.6     102 101  101 98  98   CO2 23  23 21*  21* 25  25   *  148* 266*  266* 283*  283*   BUN 20  20 26*  26* 31*  31*   CREATININE 1.3  1.3 1.3  1.3 1.2  1.2   CALCIUM 9.7  9.7 9.5  9.5 9.3  9.3   PROT 7.9 7.3 7.3   ALBUMIN 3.3* 3.3* 3.1*   BILITOT 0.3 0.3 0.3   ALKPHOS 114 113 105   AST 23 22 20   ALT 28 28 28   ANIONGAP 9  9 13  13 9  9     POCT Glucose:   Recent Labs   Lab 03/15/24  1602 03/15/24  1936 03/15/24  2057   POCTGLUCOSE 111* 60* 90       Significant Imaging: I have reviewed all pertinent imaging " "results/findings within the past 24 hours.    Assessment/Plan:      * Acute encephalopathy  Likely 2/2 to drug use, but underlying COVID+ infection and possible UTI may be contributing factors. Utox negative, but reported history of recent methamphetamine use.     -- Patient improving on mental status   -- PEC'ed patient prior to admission to .   -- Capacity evaluation QD  -- PRN Zyprexa for non-redirectable agitation / violence  -- Treat underlying infection  -- See Bipolar Disorder A/P for home medications  - Negative encephalopathy workup  - Psychiatry following, at the moment not a candidate for IP psych placement given persistent delirium   - High risk of seizures, given prior history of stroke. Follow EEG results "This is an abnormal continuous EEG monitoring study because of a moderate encephalopathy with evidence of cortical irritation and a potential seizure foci bilaterally, right>left.  There are no pushbutton activations and no electrographic seizures." Continue anti-epilleptics    Constipation  CTAP wc s/o moderate stool burden, diverticula w/o infection. Negative KUB.    - optimizing bowel regimen.       COVID  Patient presenting with incidental COVID+ 03/04/2024. No symptoms of dyspnea, shortness of breath. Presenting for unspecified weakness. No hypoxemia.  COVID-19 testing:   Isolation: Airborne/Droplet. Surgical mask on patient. Notify Infection Control. Finished remdesivir management. No steroids given not hypoxemic.     -- No O2 requirement at this time  -- COVID risk score: 5; will initiate High-Risk Remdesivir 3 day course  -- No role for Dexamethasone d/t normal SpO2 on RA  -- No role for CAP coverage d/t lack of focal consolidation on CXR  -- No role for therapeutic AC d/t normal SpO2 on RA  -- Acetaminophen 650mg PO Q6hr PRN fever/headache  -- IVF if indicated, restrictive strategy preferred, no maintenance IV if able  -- antitussives: dextromethorphan/guaifenesin, tessalon PRN  -- " albuterol: 2-4 puff q6h if patient wheezing    --RESOLVED    Acute cystitis without hematuria  -Resolved.     Bipolar disorder  -- Continue home medication regimen:  -venlafaxine  - carbamazepine 200 AM, 400 PM   - hold risperidone      Intermittent explosive disorder  Psychiatry following, patient with intermittent explosive disorder, and recommend maintaining current regimen, as well as PEC as he is confused. Current presentation consistent with encephalopathy rather than psychosis or vero. Psych not recommending Psych placement. Once he is back to baseline, they will reassess need for psych placement.       Type 1 diabetes mellitus with hyperglycemia  Patient's FSGs are uncontrolled due to hyperglycemia on current medication regimen.  Last A1c reviewed-   Lab Results   Component Value Date    HGBA1C 10.1 (H) 03/07/2024     Most recent fingerstick glucose reviewed-   Recent Labs   Lab 03/15/24  1116 03/15/24  1602 03/15/24  1936 03/15/24  2057   POCTGLUCOSE 224* 111* 60* 90       Current correctional scale  Low    Antihyperglycemics (From admission, onward)      Start     Stop Route Frequency Ordered    03/16/24 1130  insulin aspart U-100 pen 4 Units         -- SubQ 3 times daily with meals 03/16/24 0724    03/16/24 0900  insulin detemir U-100 (Levemir) pen 8 Units         -- SubQ 2 times daily 03/16/24 0723    03/07/24 1649  insulin aspart U-100 pen 0-5 Units         -- SubQ Before meals & nightly PRN 03/07/24 1550            -- Hold oral hyperglycemics  -- SSI, POCT BG qACHS; titrate basal/bolus regimen PRN to 140-180 goal  -- Diabetic diet  - Detemir 8U BID / Aspart 3U TIDWM + LDSSI, hold ASPART if patient not eating his food (pocketed food yesterday per nursing staff). SLP consulted.  - Current A1C 10.1. Reviewed previous A1C on 11.3  - patient non-compliant with insulin regimen at home     CAD (coronary artery disease)  Hyperlipidemia    Known CAD. Patient not exhibiting ACS symptoms at this time.     Last  known [facility] done 09/2023:  1.  60% in stent restenosis of previously placed right coronary artery stent.  2.  Successful balloon angioplasty with a 3 0 x 20 fall by 3.5 x 20 noncompliant balloon.  60% stenosis reduced to 0%.  YENI 3 flow.  No dissection.  3.  50% stenosis noted in the mid and distal left anterior descending artery.  There is a 70 % stenosis at the apex, small caliber.    -- Continue HI Statin  -- Continue ASA 81  -- Hold Plavix as patient's > 1 year from last PCI    COPD (chronic obstructive pulmonary disease)  Patient's COPD is controlled currently.  Patient is currently off COPD Pathway.     -PRN inhalers    Sepsis  Currently not having sepsis criteria. Positive nitrates on admission. Finished AB management.     Polysubstance use disorder  History of recent methamphetamine, tobacco, alcohol use. Likely contributor to psychiatric disorder and agitation      Hemiparesis affecting right side as late effect of stroke  Noted per history    Anemia of chronic disease  Patient's anemia is currently controlled. Has not received any PRBCs to date. Etiology likely d/t chronic disease due to recurrent infections, inflammation  Current CBC reviewed-   Lab Results   Component Value Date    HGB 12.6 (L) 03/13/2024    HCT 41.3 03/13/2024     Monitor serial CBC and transfuse if patient becomes hemodynamically unstable, symptomatic or H/H drops below 7/21.    Primary hypertension  Chronic, controlled. Latest blood pressure and vitals reviewed-     Temp:  [96.9 °F (36.1 °C)-98.3 °F (36.8 °C)]   Pulse:  [81-95]   Resp:  [16-20]   BP: (114-137)/(68-79)   SpO2:  [89 %-94 %] .   Home meds for hypertension were reviewed and noted below.   Hypertension Medications               losartan (COZAAR) 50 MG tablet Take 1 tablet (50 mg total) by mouth once daily.    metoprolol succinate (TOPROL-XL) 25 MG 24 hr tablet Take 1 tablet (25 mg total) by mouth once daily.          - on losartan 50 and nifedipine 60 as IP   - PRN  labetalol       VTE Risk Mitigation (From admission, onward)           Ordered     enoxaparin injection 40 mg  Daily         03/04/24 1904     IP VTE HIGH RISK PATIENT  Once         03/04/24 1904     Place sequential compression device  Until discontinued         03/04/24 1904                    Discharge Planning   ROLA: 3/19/2024     Code Status: Full Code   Is the patient medically ready for discharge?: No    Reason for patient still in hospital (select all that apply): Patient trending condition and Consult recommendations  Discharge Plan A: Psychiatric hospital   Discharge Delays: None known at this time              Kin Kolb MD  Department of Hospital Medicine   [facility]

## 2024-03-16 NOTE — ASSESSMENT & PLAN NOTE
Patient's anemia is currently controlled. Has not received any PRBCs to date. Etiology likely d/t chronic disease due to recurrent infections, inflammation  Current CBC reviewed-   Lab Results   Component Value Date    HGB 12.6 (L) 03/13/2024    HCT 41.3 03/13/2024     Monitor serial CBC and transfuse if patient becomes hemodynamically unstable, symptomatic or H/H drops below 7/21.   Verbal order with read back Dr. Ced Gooden  Take coumadin  8 mg  Friday, then coumadin 6 mg on Saturday and Sunday Monday and Tuesday and then resume coumadin 6 mg on Monday, Wednesday and Friday and 4 mg all other days  Return 1 week

## 2024-03-16 NOTE — PROCEDURES
VIDEO ELECTROENCEPHALOGRAM  REPORT    DATE OF SERVICE:3/16/24  EEG NUMBER: [id number]  REQUESTED BY:  Dr Vyas  [place]  [facility] [place]OLOGY   Electroencephalographic (EEG) recording is with electrodes placed according to the International 10-20 placement system.  Thirty two (32) channels of digital signal (sampling rate of 512/sec) including T1 and T2 was simultaneously recorded from the scalp and may include  EKG, EMG, and/or eye monitors.  Recording band pass was 0.1 to 512 hz.  Digital video recording of the patient is simultaneously recorded with the EEG.  The patient is instructed report clinical symptoms which may occur during the recording session.  EEG and video recording is stored and archived in digital format.  Activation procedures which include photic stimulation, hyperventilation and instructing patients to perform simple task are done in selected patients.   The EEG is displayed on a monitor screen and can be reviewed using different montages.  Computer assisted analysis is employed to detect spike and electrographic seizure activity.   The entire record is submitted for computer analysis.  The entire recording is visually reviewed and the times identified by computer analysis as being spikes or seizures are reviewed again.  Compresses spectral analysis (CSA) is also performed on the activity recorded from each individual channel.  This is displayed as a power display of frequencies from 0 to 30 Hz over time.   The CSA is reviewed looking for asymmetries in power between homologous areas of the scalp and then compared with the original EEG recording.     Housekeep software was also utilized in the review of this study.  This software suite analyzes the EEG recording in multiple domains.  Coherence and rhythmicity is computed to identify EEG sections which may contain organized seizures.  Each channel undergoes analysis to detect presence of spike and sharp waves which have special and  morphological characteristic of epileptic activity.  The routine EEG recording is converted from spacial into frequency domain.  This is then displayed comparing homologous areas to identify areas of significant asymmetry.  Algorithm to identify non-cortically generated artifact is used to separate eye movement, EMG and other artifact from the EEG.      ELECTROENCEPHALOGRAM:    RECORDING TIMES:  Start on 3/15/24 at 07:00  Stop on 3/16 at 07:00  Start 3/16/24 at 07:00  Stop 3/17/24 at 10:47    A total of 27 hours and 47 minutes of EEG recording was obtained.    Indication: 58 year old male admitted with encephalopathy and generalized weakness; found to have UTI and COVID.  EEG to further evaluate encephalopathy.    State of Consciousness:   Awake and asleep    Background:   The background is moderately disorganized, asymmetric and continuous.  It predominantly consists of irregular and sharply contoured theta/delta activity with no discernable posterior dominant rhythm.     Sleep:   Transition between sleep and wakefulness is characterized by poorly formed sleep architecture.    Epileptiform Abnormalities  Intermittent epileptiform discharges are noted in multiple distributions (ex - P4, P3/C3, T4/T6), right > left hemispheres.     Seizures/Events:   None    EKG:   Regular rate and rhythm on single lead EKG    Activating procedures:   Hyperventilation and photic stimulation are not performed     Impression:   This is an abnormal awake and sleep vEEG consistent with a moderate diffuse encephalopathy and multifocal regions of cortical irritability that is most prominent in the right > left hemisphere.  No clinical events or electrographic seizures are recorded.        Ida Jhonson MD  [facility]   Department of Neurology/Epilepsy

## 2024-03-16 NOTE — PROGRESS NOTES
"CONSULTATION LIAISON PSYCHIATRY PROGRESS NOTE    Patient Name: Cali Mabry  MRN: [id number]  Patient Class: IP- Inpatient  Admission Date: 3/4/2024  Attending Physician: Doroteo Vyas MD      SUBJECTIVE:   Cali Mabry is a 58 y.o. 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 UDS negative, Carbamazepine level undetectable. PEC placed in ED due to combative behavior and confusion. Patient was found to be COVID+ and have UTI. He was admitted to the hospital for Acute encephalopathy. Patient received Zyprexa 5 mg IM on 3/5 @ 1539.     Psychiatry consulted for "patient with history of Bipolar and substance use disorder, currently PEC'd. Consult for psych evaluation and need for placement in psych facility?"     Patient did not receive any psychotropic PRN medications overnight. Compliant with PO medications. His vital signs were stable.     Today, patient seen at beside with EEG in place. He is awake with eyes opened but gaze is fixed and blinking is infrequent. The patient acknowledges the note writer as they enter the room.  The patient's nurse is at the bedside administering medications.  The nurse states that the patient was medication compliant and had no behavioral issues overnight.  When asked how he feels this morning the patient states like shit".  The patient states that he has feelings of depression and anxiety (when asked do you have feelings of depression and anxiety patient states yes but does not elaborate further).  The patient denies suicidal ideation.  When asked about homicidal ideation, auditory hallucinations, or visual hallucinations the patient does not respond and stares blankly.  It is unclear whether the patient did not " "understand or volitionally did not respond.  The patient was oriented to person and place but not time.    OBJECTIVE:    EEG FINDINGS  Impression:   This is an abnormal awake and sleep vEEG consistent with a moderate diffuse encephalopathy and multifocal regions of cortical irritability that is most prominent in the right > left hemisphere.  No clinical events or electrographic seizures are recorded.    Mental Status Exam:  General Appearance: dressed in hospital garb, lying in bed, thin and gaunt  Behavior: minimal responses  Involuntary Movements and Motor Activity: no abnormal involuntary movements noted  Gait and Station: unable to assess - patient lying down or seated  Speech and Language: increased latency of response, paucity of speech, monotone, responds to questions minimally/briefly  Mood: "like shit"  Affect: flat  Thought Process and Associations: difficult to assess due to poverty of thought  Thought Content and Perceptions:: no suicidal ideation, Unable to Assess  Sensorium and Orientation: oriented to person and place  Recent and Remote Memory: Unable to  Formally Assess  Attention and Concentration: Unable to Formally Assess  Fund of Knowledge: Unable to Formally Assess  Insight: poor, poor awareness of illness  Judgment: impaired due to delirium    CAM ICU positive? Unable to assess    AIMS: Score 0/36  Abnormal Involuntary Movement Scale  0-4   Muscles of Facial Expression  0   Lips and Perioral Area  0   Jaw  0   Tongue  0   Upper (arms, wrists, hands, fingers)  0    Lower (legs, knees, ankles, toes)  0   Neck, shoulders, hips  0   Severity of abnormal movements (highest score from questions above)  0    Incapacitation due to abnormal movements  0    Patient's awareness of abnormal movements (rate only patient's report)  0   Current problems with teeth and/or dentures?  No    Does patient usually wear dentures?  No           ASSESSMENT & RECOMMENDATIONS   Acute Encephalopathy              R/O " NPH  Intermittent Explosive Disorder  Hx TBI  Hx CVA  Hx Unspecified Neurocognitive Disorder  Hx Bipolar Disorder  Hx MDD        Intermittent Explosive Disorder  Hx Bipolar Disorder  Hx MDD  PSYCH MEDICATIONS  Scheduled -  Continue to HOLD Risperdal 1 mg PO BID at this time  Continue to HOLD  Zyprexa (Zydis) 5 mg PO nightly at this time  Continue Tegretol home dose 200 mg daily and 400 mg nightly  Continue Effexor XR 75 mg PO daily  PRN  Continue Zyprexa 5 mg PO or IM for non-redirectable agitation associated with breakthrough psychosis or vero     DELIRIUM  EEG - currently in process, impression in chart. Will follow up on any recommendations.   Recommend CMP daily  Agree with B12, TSH, RPR  RPR done 3/13 - not detectable  TSH  done 3/13 low @0.363, T4 within normal limits @ 0.91  Recommended Neurology Consult for NPH R/O   Given acute mental status change, gait instability per collateral ~2-3 months, possible urinary incontinence and CT Head results from 3/4 with ventriculomegaly out of proportion to the degree of sulcal enlargement  Per Neuro, no interventions or investigations to be initiated in the inpatient setting.  DELIRIUM BEHAVIOR MANAGEMENT  PLEASE utilize CHEMICAL restraints with PRN meds first for agitation. Minimize use of PHYSICAL restraints OR have periods of being out of physical restraints if possible.  Keep window shades open and room lit during day and room dim at night in order to promote normal sleep-wake cycles  Encourage family at bedside. [place] patient often to situation, location, date.  Continue to Limit or Discontinue use of Narcotics, Benzos and Anti-cholinergic medications as they may worsen delirium.  Continue medical workup for causative etiology of Delirium.      RISK ASSESSMENT  NEEDS CEC because patient is in imminent danger of hurting self or others and is gravely disabled. & NEEDS 1:1 sitter     FOLLOW UP  Will follow up while in house     DISPOSITION - once medically cleared:    Will reassess daily, warrants continued medical work-up at this time    Please contact ON CALL psychiatry service (24/7) for any acute issues that may arise.    Dr. Jamarcus Browning MD  [facility] PGY-II    Psychiatry  [facility]  3/16/2024 0940         --------------------------------------------------------------------------------------------------------------------------------------------------------------------------------------------------------------------------------------    CONTINUED OBJECTIVE clinical data & findings reviewed and noted for above decision making    Current Medications:   Scheduled Meds:    aspirin  81 mg Oral Daily    atorvastatin  20 mg Oral Daily    carBAMazepine  200 mg Oral Daily    carBAMazepine  400 mg Oral QHS    enoxparin  40 mg Subcutaneous Daily    folic acid  1 mg Oral Daily    insulin aspart U-100  4 Units Subcutaneous TIDWM    insulin detemir U-100  8 Units Subcutaneous BID    losartan  100 mg Oral Daily    melatonin  6 mg Oral Nightly    multivitamin  1 tablet Oral Daily    NIFEdipine  60 mg Oral Daily    polyethylene glycol  17 g Oral Q6H WAKE    senna-docusate 8.6-50 mg  2 tablet Oral BID    thiamine  100 mg Oral Daily    venlafaxine  75 mg Oral Daily     PRN Meds: albuterol, benzonatate, dextromethorphan-guaiFENesin  mg/5 ml, dextrose 10%, dextrose 10%, glucagon (human recombinant), glucose, glucose, insulin aspart U-100, labetalol, naloxone, OLANZapine, ondansetron, sodium chloride 0.9%    Allergies:   Review of patient's allergies indicates:  No Known Allergies    Vitals  Vitals:    03/16/24 0743   BP: 137/79   Pulse: 83   Resp: 20   Temp: 97.5 °F (36.4 °C)       Labs/Imaging/Studies:  Recent Results (from the past 24 hour(s))   POCT glucose    Collection Time: 03/15/24 11:16 AM   Result Value Ref Range    POCT Glucose 224 (H) 70 - 110 mg/dL   POCT glucose    Collection Time: 03/15/24  4:02 PM   Result Value Ref Range    POCT Glucose  111 (H) 70 - 110 mg/dL   POCT glucose    Collection Time: 03/15/24  7:36 PM   Result Value Ref Range    POCT Glucose 60 (L) 70 - 110 mg/dL   POCT glucose    Collection Time: 03/15/24  8:57 PM   Result Value Ref Range    POCT Glucose 90 70 - 110 mg/dL   Basic Metabolic Panel    Collection Time: 03/16/24  6:25 AM   Result Value Ref Range    Sodium 132 (L) 136 - 145 mmol/L    Potassium 4.6 3.5 - 5.1 mmol/L    Chloride 98 95 - 110 mmol/L    CO2 25 23 - 29 mmol/L    Glucose 283 (H) 70 - 110 mg/dL    BUN 31 (H) 6 - 20 mg/dL    Creatinine 1.2 0.5 - 1.4 mg/dL    Calcium 9.3 8.7 - 10.5 mg/dL    Anion Gap 9 8 - 16 mmol/L    eGFR >60.0 >60 mL/min/1.73 m^2   Comprehensive metabolic panel    Collection Time: 03/16/24  6:25 AM   Result Value Ref Range    Sodium 132 (L) 136 - 145 mmol/L    Potassium 4.6 3.5 - 5.1 mmol/L    Chloride 98 95 - 110 mmol/L    CO2 25 23 - 29 mmol/L    Glucose 283 (H) 70 - 110 mg/dL    BUN 31 (H) 6 - 20 mg/dL    Creatinine 1.2 0.5 - 1.4 mg/dL    Calcium 9.3 8.7 - 10.5 mg/dL    Total Protein 7.3 6.0 - 8.4 g/dL    Albumin 3.1 (L) 3.5 - 5.2 g/dL    Total Bilirubin 0.3 0.1 - 1.0 mg/dL    Alkaline Phosphatase 105 55 - 135 U/L    AST 20 10 - 40 U/L    ALT 28 10 - 44 U/L    eGFR >60.0 >60 mL/min/1.73 m^2    Anion Gap 9 8 - 16 mmol/L     Imaging Results              CT Head Without Contrast (Final result)  Result time 03/04/24 16:55:45      Final result by Jered Cheatham MD (03/04/24 16:55:45)                   Impression:      No evidence of acute hemorrhage or major vascular distribution infarct.    Chronic small vessel ischemic change with remote right occipital and left cerebellar infarcts.    Ventriculomegaly out of proportion to the degree of sulcal enlargement, similar to prior exams.  While this could relate to cerebral volume loss, the configuration does raise the possibility of normal pressure hydrocephalus. Clinical correlation required.      Electronically signed by: Jered Cheatham,  MD  Date:    03/04/2024  Time:    16:55               Narrative:    EXAMINATION:  CT HEAD WITHOUT CONTRAST    CLINICAL HISTORY:  Mental status change, unknown cause;    TECHNIQUE:  Low dose axial CT images obtained throughout the head without the use of intravenous contrast.  Axial, sagittal and coronal reconstructions were performed.    COMPARISON:  12/10/2023.    FINDINGS:  Intracranial compartment:    Ventricles are enlarged, similar to prior.  Third ventricle diameter again measuring up to 1.4 cm.  Ventricular enlargement out of proportion of degree of sulcal enlargement.    Chronic small vessel ischemic change in the supratentorial white matter.  Remote right occipital infarct.  Remote left cerebellar infarct.  No acute major vascular distribution infarct.  No acute parenchymal hemorrhage.  No new intracranial mass effect or midline shift.    No extra-axial blood or fluid collections.    Skull/extracranial contents (limited evaluation):    No displaced calvarial fracture.    Mastoid air cells are clear. Mild patchy mucosal thickening in the visualized paranasal sinuses.                                       CT Abdomen Pelvis With IV Contrast NO Oral Contrast (Final result)  Result time 03/04/24 18:05:52      Final result by New Falcon MD (03/04/24 18:05:52)                   Impression:      Moderate colonic stool burden with mild wall thickening of the rectum, correlate with signs of constipation or proctitis.    Diffuse bladder wall thickening, correlate with urinalysis and symptoms of cystitis.    Diverticulosis coli without evidence of acute diverticulitis    Stable 5 mm right middle lobe solid pulmonary micronodule.    Additional findings as above.    Electronically signed by resident: Marsha Cardona  Date:    03/04/2024  Time:    17:27    Electronically signed by: New Falcon MD  Date:    03/04/2024  Time:    18:05               Narrative:    EXAMINATION:  CT ABDOMEN PELVIS WITH IV CONTRAST    CLINICAL  HISTORY:  Abdominal abscess/infection suspected;    TECHNIQUE:  Axial images of the abdomen and pelvis were acquired after administration of 75 cc Omnipaque 350 IV contrast. No oral contrast was administered.  Coronal and sagittal reconstructions were also obtained.    COMPARISON:  CT 12/12/2023 and 07/26/2023.    FINDINGS:  Examination degraded by patient motion artifact.    Heart: Normal heart size.  No pericardial fluid.  No significant calcific coronary atherosclerosis.    Lungs: Evaluation of the lung bases limited due to respiratory motion.  Allowing for this, stable linear bandlike opacities of the right lung base suggestive of atelectasis/scarring.  Stable 5 mm right middle lobe solid pulmonary micronodule (2-7).  No new focal consolidation.  No pleural effusion or pneumothorax.    Liver: Normal in size and contour.  No focal hepatic lesion.    Gallbladder: No calcified gallstones.    Bile Ducts: No biliary ductal dilatation.    Pancreas: No mass or peripancreatic fat stranding.    Spleen: Spleen is upper limits of normal in size measuring 12 cm.    Adrenals: No significant abnormalities.    Kidneys/Ureters: Normal in size and location. Normal enhancement. No hydronephrosis or nephrolithiasis. No ureteral dilatation.    Bladder: Diffuse bladder wall thickening.    Reproductive organs: Prostate is unremarkable.    Peritoneum: No free air or free fluid.    Retroperitoneum: No pathologically enlarged retroperitoneal lymph nodes.    Bowel/Mesentery: Small hiatal hernia.  Stomach is otherwise unremarkable.  Small bowel is normal in caliber without evidence of inflammation or obstruction.  Moderate colonic stool burden.  Mild wall thickening of the rectum with large fecal burden, component of proctitis to be considered.  Scattered colonic diverticuli.  Appendix not definitively visualized.  No steve-cecal soft tissue stranding.    Abdominal wall:  Small fat containing umbilical hernia. 4.7 x 1.8 cm fat containing  lesion along the left anterolateral chest wall, favored to represent a lipoma.    Vasculature: Mild calcific atherosclerosis of the abdominal aorta and its branches.  No aneurysm.    Bones: Mild degenerative changes spine.  Prominent Schmorl's node at L1, unchanged.  Healing left anterolateral rib fractures.  No acute fractures.  Lucent lesion within the right femoral neck, stable dating back to multiple priors.                                       X-Ray Chest AP Portable (Final result)  Result time 03/04/24 13:37:35      Final result by Cullen Bush MD (03/04/24 13:37:35)                   Impression:      See above      Electronically signed by: Cullen Bush MD  Date:    03/04/2024  Time:    13:37               Narrative:    EXAMINATION:  XR CHEST AP PORTABLE    CLINICAL HISTORY:  Sepsis;    TECHNIQUE:  Single frontal view of the chest was performed.    COMPARISON:  None 12/22/2023    FINDINGS:  Heart size normal.  The lungs are clear.  No pleural effusion

## 2024-03-17 LAB
ALBUMIN SERPL BCP-MCNC: 3.2 G/DL (ref 3.5–5.2)
ALP SERPL-CCNC: 106 U/L (ref 55–135)
ALT SERPL W/O P-5'-P-CCNC: 27 U/L (ref 10–44)
ANION GAP SERPL CALC-SCNC: 9 MMOL/L (ref 8–16)
ANION GAP SERPL CALC-SCNC: 9 MMOL/L (ref 8–16)
AST SERPL-CCNC: 17 U/L (ref 10–40)
BILIRUB SERPL-MCNC: 0.3 MG/DL (ref 0.1–1)
BUN SERPL-MCNC: 29 MG/DL (ref 6–20)
BUN SERPL-MCNC: 29 MG/DL (ref 6–20)
CALCIUM SERPL-MCNC: 9.2 MG/DL (ref 8.7–10.5)
CALCIUM SERPL-MCNC: 9.2 MG/DL (ref 8.7–10.5)
CHLORIDE SERPL-SCNC: 100 MMOL/L (ref 95–110)
CHLORIDE SERPL-SCNC: 100 MMOL/L (ref 95–110)
CO2 SERPL-SCNC: 24 MMOL/L (ref 23–29)
CO2 SERPL-SCNC: 24 MMOL/L (ref 23–29)
CREAT SERPL-MCNC: 1.1 MG/DL (ref 0.5–1.4)
CREAT SERPL-MCNC: 1.1 MG/DL (ref 0.5–1.4)
EST. GFR  (NO RACE VARIABLE): >60 ML/MIN/1.73 M^2
EST. GFR  (NO RACE VARIABLE): >60 ML/MIN/1.73 M^2
GLUCOSE SERPL-MCNC: 197 MG/DL (ref 70–110)
GLUCOSE SERPL-MCNC: 197 MG/DL (ref 70–110)
POCT GLUCOSE: 164 MG/DL (ref 70–110)
POCT GLUCOSE: 271 MG/DL (ref 70–110)
POCT GLUCOSE: 302 MG/DL (ref 70–110)
POCT GLUCOSE: 332 MG/DL (ref 70–110)
POTASSIUM SERPL-SCNC: 4.5 MMOL/L (ref 3.5–5.1)
POTASSIUM SERPL-SCNC: 4.5 MMOL/L (ref 3.5–5.1)
PROT SERPL-MCNC: 7.2 G/DL (ref 6–8.4)
SODIUM SERPL-SCNC: 133 MMOL/L (ref 136–145)
SODIUM SERPL-SCNC: 133 MMOL/L (ref 136–145)

## 2024-03-17 PROCEDURE — 80053 COMPREHEN METABOLIC PANEL: CPT

## 2024-03-17 PROCEDURE — 63600175 PHARM REV CODE 636 W HCPCS

## 2024-03-17 PROCEDURE — 25000003 PHARM REV CODE 250

## 2024-03-17 PROCEDURE — 99232 SBSQ HOSP IP/OBS MODERATE 35: CPT | Mod: ,,, | Performed by: PSYCHIATRY & NEUROLOGY

## 2024-03-17 PROCEDURE — 27000207 HC ISOLATION

## 2024-03-17 PROCEDURE — 25000003 PHARM REV CODE 250: Performed by: STUDENT IN AN ORGANIZED HEALTH CARE EDUCATION/TRAINING PROGRAM

## 2024-03-17 PROCEDURE — 25000003 PHARM REV CODE 250: Performed by: HOSPITALIST

## 2024-03-17 PROCEDURE — 36415 COLL VENOUS BLD VENIPUNCTURE: CPT

## 2024-03-17 PROCEDURE — 21400001 HC TELEMETRY ROOM

## 2024-03-17 RX ADMIN — ENOXAPARIN SODIUM 40 MG: 40 INJECTION SUBCUTANEOUS at 06:03

## 2024-03-17 RX ADMIN — Medication 100 MG: at 09:03

## 2024-03-17 RX ADMIN — ASPIRIN 81 MG CHEWABLE TABLET 81 MG: 81 TABLET CHEWABLE at 09:03

## 2024-03-17 RX ADMIN — INSULIN ASPART 4 UNITS: 100 INJECTION, SOLUTION INTRAVENOUS; SUBCUTANEOUS at 12:03

## 2024-03-17 RX ADMIN — SENNOSIDES AND DOCUSATE SODIUM 2 TABLET: 8.6; 5 TABLET ORAL at 10:03

## 2024-03-17 RX ADMIN — LOSARTAN POTASSIUM 100 MG: 50 TABLET, FILM COATED ORAL at 09:03

## 2024-03-17 RX ADMIN — FOLIC ACID 1 MG: 1 TABLET ORAL at 09:03

## 2024-03-17 RX ADMIN — POLYETHYLENE GLYCOL 3350 17 G: 17 POWDER, FOR SOLUTION ORAL at 09:03

## 2024-03-17 RX ADMIN — ATORVASTATIN CALCIUM 20 MG: 20 TABLET, FILM COATED ORAL at 09:03

## 2024-03-17 RX ADMIN — NIFEDIPINE 60 MG: 60 TABLET, FILM COATED, EXTENDED RELEASE ORAL at 09:03

## 2024-03-17 RX ADMIN — INSULIN ASPART 4 UNITS: 100 INJECTION, SOLUTION INTRAVENOUS; SUBCUTANEOUS at 09:03

## 2024-03-17 RX ADMIN — INSULIN ASPART 5 UNITS: 100 INJECTION, SOLUTION INTRAVENOUS; SUBCUTANEOUS at 12:03

## 2024-03-17 RX ADMIN — Medication 6 MG: at 10:03

## 2024-03-17 RX ADMIN — VENLAFAXINE HYDROCHLORIDE 75 MG: 75 CAPSULE, EXTENDED RELEASE ORAL at 09:03

## 2024-03-17 RX ADMIN — SENNOSIDES AND DOCUSATE SODIUM 2 TABLET: 8.6; 5 TABLET ORAL at 09:03

## 2024-03-17 RX ADMIN — CARBAMAZEPINE 400 MG: 200 TABLET ORAL at 10:03

## 2024-03-17 RX ADMIN — THERA TABS 1 TABLET: TAB at 09:03

## 2024-03-17 RX ADMIN — POLYETHYLENE GLYCOL 3350 17 G: 17 POWDER, FOR SOLUTION ORAL at 10:03

## 2024-03-17 RX ADMIN — INSULIN ASPART 5 UNITS: 100 INJECTION, SOLUTION INTRAVENOUS; SUBCUTANEOUS at 09:03

## 2024-03-17 RX ADMIN — INSULIN ASPART 4 UNITS: 100 INJECTION, SOLUTION INTRAVENOUS; SUBCUTANEOUS at 06:03

## 2024-03-17 RX ADMIN — GUAIFENESIN AND DEXTROMETHORPHAN 5 ML: 100; 10 SYRUP ORAL at 02:03

## 2024-03-17 RX ADMIN — INSULIN DETEMIR 10 UNITS: 100 INJECTION, SOLUTION SUBCUTANEOUS at 10:03

## 2024-03-17 RX ADMIN — INSULIN DETEMIR 8 UNITS: 100 INJECTION, SOLUTION SUBCUTANEOUS at 09:03

## 2024-03-17 RX ADMIN — CARBAMAZEPINE 200 MG: 200 TABLET ORAL at 09:03

## 2024-03-17 RX ADMIN — INSULIN ASPART 5 UNITS: 100 INJECTION, SOLUTION INTRAVENOUS; SUBCUTANEOUS at 06:03

## 2024-03-17 NOTE — PROGRESS NOTES
"CONSULTATION LIAISON PSYCHIATRY PROGRESS NOTE    Patient Name: Cali Mabry  MRN: [id number]  Patient Class: IP- Inpatient  Admission Date: 3/4/2024  Attending Physician: Doroteo Vyas MD    HPI:  58M with past psychiatric history of intermittent explosive disorder 2/2 TBI v CVA, Bipolar Disorder, Major Neurocognitive Disorder, MDD, IVDU, and stimulant use disorder & past pertinent medical history of acute on chronic pancreatitis, CHF, T1DM, and CVA presents to the ED reportedly via EMS for AMS. Admitted for Acute Encephalopathy management. Consulted for "patient with history of Bipolar and substance use disorder, currently PEC'd. Consult for psych evaluation and need for placement in psych facility?" PEC placed in ED due to combative behavior and confusion. Patient was found to be COVID+. UDS neg    SUBJECTIVE:   NAEON. Compliant w/ scheduled Tegretol & Effexor at recommended doses. No behavorial PRNs overnight. VSS on room air.    Pt seen in room on interview laying in bed w/ 1:1 sitter. Pt was awake and responded linearly to interview questions, however was irritable and non-cooperative asking "what do you want" intermittently throughout brief interview. When asked orientation questions (e.g. his name), he replied w/ "what do you want?....you know my name." Refused to participate w/ remainder of A&O questions. Pt affirmed eating and sleeping w/o issues. Affirmed no medication side-effects. Denied SI; when asked about HI, he replied w/ "oh God" then remained silent visibly irritated. Interview was ceased at that time.    OBJECTIVE:    Mental Status Exam:  General Appearance: dressed in hospital garb, lying in bed, thin and gaunt  Behavior: reluctant to participate, minimal responses, guarded  Involuntary Movements and Motor Activity: no abnormal involuntary movements noted  Gait and Station: unable to assess - patient lying down or seated  Speech and Language: spontaneous, monotone, responds to questions " "minimally/briefly  Mood: "what do you want?"  Affect: reactive, blunted, irritable  Thought Process and Associations: linear, logical  Thought Content and Perceptions::  Denied SI; does not appear to be +RIS  Sensorium and Orientation:  refused to participate, Unable to Formally Assess  Recent and Remote Memory: Unable to  Formally Assess  Attention and Concentration: Unable to Formally Assess  Fund of Knowledge: Unable to Formally Assess  Insight: limited/partial awareness of illness  Judgment: limited    CAM ICU positive? Unable to assess    AIMS: Score 0/36  Abnormal Involuntary Movement Scale  0-4   Muscles of Facial Expression  0   Lips and Perioral Area  0   Jaw  0   Tongue  0   Upper (arms, wrists, hands, fingers)  0    Lower (legs, knees, ankles, toes)  0   Neck, shoulders, hips  0   Severity of abnormal movements (highest score from questions above)  0    Incapacitation due to abnormal movements  0    Patient's awareness of abnormal movements (rate only patient's report)  0   Current problems with teeth and/or dentures?  No    Does patient usually wear dentures?  No       ASSESSMENT & RECOMMENDATIONS   Acute Encephalopathy              R/O NPH  Intermittent Explosive Disorder  Hx TBI  Hx CVA  Hx Unspecified Neurocognitive Disorder  Hx Bipolar Disorder  Hx MDD     Continue to HOLD both Risperdal 1 mg PO BID & Zyprexa (Zydis) 5 mg PO nightly at this time due to concerns of EPS & excessive sedation.  Continue Tegretol home dose 200 mg daily and 400 mg nightly  Continue Effexor XR 75 mg PO daily  PRN Zyprexa 5 mg PO/IM TID for non-redirectable agitation associated with breakthrough psychosis or vero    DELIRIUM BEHAVIOR MANAGEMENT  PLEASE utilize CHEMICAL restraints with PRN meds first for agitation. Minimize use of PHYSICAL restraints OR have periods of being out of physical restraints if possible.  Keep window shades open and room lit during day and room dim at night in order to promote normal sleep-wake " cycles  Encourage family at bedside. [place] patient often to situation, location, date.  Continue to Limit or Discontinue use of Narcotics, Benzos and Anti-cholinergic medications as they may worsen delirium.  Continue medical workup for causative etiology of Delirium.        RISK ASSESSMENT  Continue CEC because patient is in imminent danger of hurting self or others and is gravely disabled. & NEEDS 1:1 sitter.  CEC exp 3/19/24 @ 1613h     FOLLOW UP  Will follow up while in house     DISPOSITION - once medically cleared:   Will reassess daily, warrants continued medical work-up at this time    Please contact ON CALL psychiatry service (24/7) for any acute issues that may arise.    Dr. Dominik Fine MD  [facility] PGY-II    Psychiatry  [facility]  3/17/2024 0940     --------------------------------------------------------------------------------------------------------------------------------------------------------------------------------------------------------------------------------------    CONTINUED OBJECTIVE clinical data & findings reviewed and noted for above decision making    Current Medications:   Scheduled Meds:    aspirin  81 mg Oral Daily    atorvastatin  20 mg Oral Daily    carBAMazepine  200 mg Oral Daily    carBAMazepine  400 mg Oral QHS    enoxparin  40 mg Subcutaneous Daily    folic acid  1 mg Oral Daily    insulin aspart U-100  5 Units Subcutaneous TIDWM    insulin detemir U-100  8 Units Subcutaneous BID    losartan  100 mg Oral Daily    melatonin  6 mg Oral Nightly    multivitamin  1 tablet Oral Daily    NIFEdipine  60 mg Oral Daily    polyethylene glycol  17 g Oral Q6H WAKE    senna-docusate 8.6-50 mg  2 tablet Oral BID    thiamine  100 mg Oral Daily    venlafaxine  75 mg Oral Daily     PRN Meds: albuterol, benzonatate, dextromethorphan-guaiFENesin  mg/5 ml, dextrose 10%, dextrose 10%, glucagon (human recombinant), glucose, glucose, insulin aspart  U-100, labetalol, naloxone, OLANZapine, ondansetron, sodium chloride 0.9%    Allergies:   Review of patient's allergies indicates:  No Known Allergies    Vitals  Vitals:    03/17/24 0355   BP:    Pulse: 75   Resp:    Temp:        Labs/Imaging/Studies:  Recent Results (from the past 24 hour(s))   POCT glucose    Collection Time: 03/16/24  8:40 AM   Result Value Ref Range    POCT Glucose 283 (H) 70 - 110 mg/dL   CBC auto differential    Collection Time: 03/16/24 10:20 AM   Result Value Ref Range    WBC 7.33 3.90 - 12.70 K/uL    RBC 5.28 4.60 - 6.20 M/uL    Hemoglobin 13.3 (L) 14.0 - 18.0 g/dL    Hematocrit 42.0 40.0 - 54.0 %    MCV 80 (L) 82 - 98 fL    MCH 25.2 (L) 27.0 - 31.0 pg    MCHC 31.7 (L) 32.0 - 36.0 g/dL    RDW 14.3 11.5 - 14.5 %    Platelets 407 150 - 450 K/uL    MPV 9.7 9.2 - 12.9 fL    Immature Granulocytes 0.8 (H) 0.0 - 0.5 %    Gran # (ANC) 4.9 1.8 - 7.7 K/uL    Immature Grans (Abs) 0.06 (H) 0.00 - 0.04 K/uL    Lymph # 1.4 1.0 - 4.8 K/uL    Mono # 0.7 0.3 - 1.0 K/uL    Eos # 0.2 0.0 - 0.5 K/uL    Baso # 0.05 0.00 - 0.20 K/uL    nRBC 0 0 /100 WBC    Gran % 66.7 38.0 - 73.0 %    Lymph % 19.1 18.0 - 48.0 %    Mono % 10.1 4.0 - 15.0 %    Eosinophil % 2.6 0.0 - 8.0 %    Basophil % 0.7 0.0 - 1.9 %    Differential Method Automated    POCT glucose    Collection Time: 03/16/24 11:39 AM   Result Value Ref Range    POCT Glucose 300 (H) 70 - 110 mg/dL   POCT glucose    Collection Time: 03/16/24  4:09 PM   Result Value Ref Range    POCT Glucose 182 (H) 70 - 110 mg/dL   POCT glucose    Collection Time: 03/16/24  8:27 PM   Result Value Ref Range    POCT Glucose 164 (H) 70 - 110 mg/dL   POCT glucose    Collection Time: 03/16/24 10:32 PM   Result Value Ref Range    POCT Glucose 118 (H) 70 - 110 mg/dL   Basic Metabolic Panel    Collection Time: 03/17/24  4:21 AM   Result Value Ref Range    Sodium 133 (L) 136 - 145 mmol/L    Potassium 4.5 3.5 - 5.1 mmol/L    Chloride 100 95 - 110 mmol/L    CO2 24 23 - 29 mmol/L    Glucose  197 (H) 70 - 110 mg/dL    BUN 29 (H) 6 - 20 mg/dL    Creatinine 1.1 0.5 - 1.4 mg/dL    Calcium 9.2 8.7 - 10.5 mg/dL    Anion Gap 9 8 - 16 mmol/L    eGFR >60.0 >60 mL/min/1.73 m^2   Comprehensive metabolic panel    Collection Time: 03/17/24  4:21 AM   Result Value Ref Range    Sodium 133 (L) 136 - 145 mmol/L    Potassium 4.5 3.5 - 5.1 mmol/L    Chloride 100 95 - 110 mmol/L    CO2 24 23 - 29 mmol/L    Glucose 197 (H) 70 - 110 mg/dL    BUN 29 (H) 6 - 20 mg/dL    Creatinine 1.1 0.5 - 1.4 mg/dL    Calcium 9.2 8.7 - 10.5 mg/dL    Total Protein 7.2 6.0 - 8.4 g/dL    Albumin 3.2 (L) 3.5 - 5.2 g/dL    Total Bilirubin 0.3 0.1 - 1.0 mg/dL    Alkaline Phosphatase 106 55 - 135 U/L    AST 17 10 - 40 U/L    ALT 27 10 - 44 U/L    eGFR >60.0 >60 mL/min/1.73 m^2    Anion Gap 9 8 - 16 mmol/L     Imaging Results              CT Head Without Contrast (Final result)  Result time 03/04/24 16:55:45      Final result by Jered Cheatham MD (03/04/24 16:55:45)                   Impression:      No evidence of acute hemorrhage or major vascular distribution infarct.    Chronic small vessel ischemic change with remote right occipital and left cerebellar infarcts.    Ventriculomegaly out of proportion to the degree of sulcal enlargement, similar to prior exams.  While this could relate to cerebral volume loss, the configuration does raise the possibility of normal pressure hydrocephalus. Clinical correlation required.      Electronically signed by: Jered Cheatham MD  Date:    03/04/2024  Time:    16:55               Narrative:    EXAMINATION:  CT HEAD WITHOUT CONTRAST    CLINICAL HISTORY:  Mental status change, unknown cause;    TECHNIQUE:  Low dose axial CT images obtained throughout the head without the use of intravenous contrast.  Axial, sagittal and coronal reconstructions were performed.    COMPARISON:  12/10/2023.    FINDINGS:  Intracranial compartment:    Ventricles are enlarged, similar to prior.  Third ventricle diameter again  measuring up to 1.4 cm.  Ventricular enlargement out of proportion of degree of sulcal enlargement.    Chronic small vessel ischemic change in the supratentorial white matter.  Remote right occipital infarct.  Remote left cerebellar infarct.  No acute major vascular distribution infarct.  No acute parenchymal hemorrhage.  No new intracranial mass effect or midline shift.    No extra-axial blood or fluid collections.    Skull/extracranial contents (limited evaluation):    No displaced calvarial fracture.    Mastoid air cells are clear. Mild patchy mucosal thickening in the visualized paranasal sinuses.                                       CT Abdomen Pelvis With IV Contrast NO Oral Contrast (Final result)  Result time 03/04/24 18:05:52      Final result by New Falcon MD (03/04/24 18:05:52)                   Impression:      Moderate colonic stool burden with mild wall thickening of the rectum, correlate with signs of constipation or proctitis.    Diffuse bladder wall thickening, correlate with urinalysis and symptoms of cystitis.    Diverticulosis coli without evidence of acute diverticulitis    Stable 5 mm right middle lobe solid pulmonary micronodule.    Additional findings as above.    Electronically signed by resident: Marsha Cardona  Date:    03/04/2024  Time:    17:27    Electronically signed by: New Falcon MD  Date:    03/04/2024  Time:    18:05               Narrative:    EXAMINATION:  CT ABDOMEN PELVIS WITH IV CONTRAST    CLINICAL HISTORY:  Abdominal abscess/infection suspected;    TECHNIQUE:  Axial images of the abdomen and pelvis were acquired after administration of 75 cc Omnipaque 350 IV contrast. No oral contrast was administered.  Coronal and sagittal reconstructions were also obtained.    COMPARISON:  CT 12/12/2023 and 07/26/2023.    FINDINGS:  Examination degraded by patient motion artifact.    Heart: Normal heart size.  No pericardial fluid.  No significant calcific coronary  atherosclerosis.    Lungs: Evaluation of the lung bases limited due to respiratory motion.  Allowing for this, stable linear bandlike opacities of the right lung base suggestive of atelectasis/scarring.  Stable 5 mm right middle lobe solid pulmonary micronodule (2-7).  No new focal consolidation.  No pleural effusion or pneumothorax.    Liver: Normal in size and contour.  No focal hepatic lesion.    Gallbladder: No calcified gallstones.    Bile Ducts: No biliary ductal dilatation.    Pancreas: No mass or peripancreatic fat stranding.    Spleen: Spleen is upper limits of normal in size measuring 12 cm.    Adrenals: No significant abnormalities.    Kidneys/Ureters: Normal in size and location. Normal enhancement. No hydronephrosis or nephrolithiasis. No ureteral dilatation.    Bladder: Diffuse bladder wall thickening.    Reproductive organs: Prostate is unremarkable.    Peritoneum: No free air or free fluid.    Retroperitoneum: No pathologically enlarged retroperitoneal lymph nodes.    Bowel/Mesentery: Small hiatal hernia.  Stomach is otherwise unremarkable.  Small bowel is normal in caliber without evidence of inflammation or obstruction.  Moderate colonic stool burden.  Mild wall thickening of the rectum with large fecal burden, component of proctitis to be considered.  Scattered colonic diverticuli.  Appendix not definitively visualized.  No steve-cecal soft tissue stranding.    Abdominal wall:  Small fat containing umbilical hernia. 4.7 x 1.8 cm fat containing lesion along the left anterolateral chest wall, favored to represent a lipoma.    Vasculature: Mild calcific atherosclerosis of the abdominal aorta and its branches.  No aneurysm.    Bones: Mild degenerative changes spine.  Prominent Schmorl's node at L1, unchanged.  Healing left anterolateral rib fractures.  No acute fractures.  Lucent lesion within the right femoral neck, stable dating back to multiple priors.                                       X-Ray  Chest AP Portable (Final result)  Result time 03/04/24 13:37:35      Final result by Cullen Bush MD (03/04/24 13:37:35)                   Impression:      See above      Electronically signed by: Cullen Bush MD  Date:    03/04/2024  Time:    13:37               Narrative:    EXAMINATION:  XR CHEST AP PORTABLE    CLINICAL HISTORY:  Sepsis;    TECHNIQUE:  Single frontal view of the chest was performed.    COMPARISON:  None 12/22/2023    FINDINGS:  Heart size normal.  The lungs are clear.  No pleural effusion

## 2024-03-17 NOTE — ASSESSMENT & PLAN NOTE
Chronic, controlled. Latest blood pressure and vitals reviewed-     Temp:  [97.4 °F (36.3 °C)-97.5 °F (36.4 °C)]   Pulse:  [75-98]   Resp:  [20]   BP: (116-143)/(74-90)   SpO2:  [94 %-96 %] .   Home meds for hypertension were reviewed and noted below.   Hypertension Medications               losartan (COZAAR) 50 MG tablet Take 1 tablet (50 mg total) by mouth once daily.    metoprolol succinate (TOPROL-XL) 25 MG 24 hr tablet Take 1 tablet (25 mg total) by mouth once daily.          - on losartan 50 and nifedipine 60 as IP   - PRN labetalol

## 2024-03-17 NOTE — ASSESSMENT & PLAN NOTE
Patient's FSGs are uncontrolled due to hyperglycemia on current medication regimen.  Last A1c reviewed-   Lab Results   Component Value Date    HGBA1C 10.1 (H) 03/07/2024     Most recent fingerstick glucose reviewed-   Recent Labs   Lab 03/16/24  1139 03/16/24  1609 03/16/24 2027 03/16/24  2232   POCTGLUCOSE 300* 182* 164* 118*       Current correctional scale  Low    Antihyperglycemics (From admission, onward)      Start     Stop Route Frequency Ordered    03/16/24 1645  insulin aspart U-100 pen 5 Units         -- SubQ 3 times daily with meals 03/16/24 1302    03/16/24 0900  insulin detemir U-100 (Levemir) pen 8 Units         -- SubQ 2 times daily 03/16/24 0723    03/07/24 1649  insulin aspart U-100 pen 0-5 Units         -- SubQ Before meals & nightly PRN 03/07/24 1550            -- Hold oral hyperglycemics  -- SSI, POCT BG qACHS; titrate basal/bolus regimen PRN to 140-180 goal  -- Diabetic diet  - Detemir 10U BID / Aspart 3U TIDWM + LDSSI, hold ASPART if patient not eating his food (patiens it pocketing his food). SLP consulted.  - Current A1C 10.1. Reviewed previous A1C on 11.3  - patient non-compliant with insulin regimen at home

## 2024-03-17 NOTE — ASSESSMENT & PLAN NOTE
Patient's anemia is currently controlled. Has not received any PRBCs to date. Etiology likely d/t chronic disease due to recurrent infections, inflammation  Current CBC reviewed-   Lab Results   Component Value Date    HGB 13.3 (L) 03/16/2024    HCT 42.0 03/16/2024     Monitor serial CBC and transfuse if patient becomes hemodynamically unstable, symptomatic or H/H drops below 7/21.

## 2024-03-17 NOTE — PROGRESS NOTES
[facility] Medicine  Progress Note    Patient Name: Cali Mabry  MRN: [id number]  Patient Class: IP- Inpatient   Admission Date: 3/4/2024  Length of Stay: 13 days  Attending Physician: Doroteo Vyas MD  Primary Care Provider: Lawanda Flores NP        Subjective:     Principal Problem:Acute encephalopathy        HPI:  58 M with DM1, extensive IVDU, HCV Ab+, HTN, previous CVA with R sided deficits, HFpEF, CAD, chronic pancreatitis, mixed mood / psychiatric disorder presenting to [facility] ED with chief complaint of weakness of unspecified duration and onset. Admits to using shooting methamphetamines. Rest of history from patient is non-contributory due to uncooperation and alternating agitation and somnolence. HPI is written with review of previous medical records, ED records, discussed with patient's mother via telephone, who was able to provide limited insight into the patient's acute medical condition. Patient's mother states that patient's PO intake has decreased but he would not cooperate with her nor would he tell her what was bothering him. Patient reported to want to stay in bed for the last 2 days.     Of note, patient was recently discharged from [facility] following treatment of DKA and UTI (largely pan-sensitive Proteus mirabilis). In ED, patient was extremely agitated, given multiple doses of Ativan due to agitation, aggressive/abusive statements towards nursing and medical staff. Tachycardic to 110s, hypertensive, febrile to 101.5. Given broad spectrum Abx (Vanc/Zosyn). PEC'ed by ED due to extreme agitation.    Overview/Hospital Course:  Patient admitted for altered mental status. Suspect it was secondary to UTI and COVID infection. UDS negative. Patient was PEC'd while in the ED for agitation. Patients mental status improving. Will consult psychiatry if patient needs psych placement. Finished remdesivir course, not hypoxic. Improving respiratory status. Minor episodes of cough.  Reviewed previous A1C from 2 months on 11.3. uptitrated insulin regimen. Psychiatry following, patient with intermittent explosive disorder, and recommend maintaining current regimen, as well as PEC as he is confused. Current presentation consistent with encephalopathy rather than psychosis or vero. Psych not recommending Psych placement. Once he is back to baseline, they will reassess need for psych placement. Titrating blood glucose. Continues on PO cefpodoxime. Neurology consulted for NPH, recommending outpatient neurosurgery evaluation. Episode of hypertension, controlled wit PRN labetalol. Started on PO amlodipine for BP control. Decreased risperidone dose as per psychiatry recs given patient's tone is increased on examination. Hypoglycemic overnight, started on D10 bolus, improved. Will reach out again to family for new collaterals on patient baseline status. Somnolent on examination. Optimizing blood glucose management, PRN zyprexa for agitation only. Increased tegretol dose to 200 AM, 400PM as per psychiatry recommendations. Continue holding risperidone. Patient somnolent. No new episodes of agitation for more than 48hs. Optimizing BG. EEG with isolated discharges R > L without seizure activity.     Interval History: no new episodes of agitation. EEG without seizure activity. Nature of encephalopathy suggesting metabolic given the fact of chronic use of methamphetamines. Optimizing BG.     Review of Systems   Unable to perform ROS: Psychiatric disorder     Objective:     Vital Signs (Most Recent):  Temp: 97.5 °F (36.4 °C) (03/16/24 1608)  Pulse: 75 (03/17/24 0355)  Resp: 20 (03/16/24 2100)  BP: 120/74 (03/16/24 2100)  SpO2: 96 % (03/17/24 0500) Vital Signs (24h Range):  Temp:  [97.4 °F (36.3 °C)-97.5 °F (36.4 °C)] 97.5 °F (36.4 °C)  Pulse:  [75-98] 75  Resp:  [20] 20  SpO2:  [94 %-96 %] 96 %  BP: (116-143)/(74-90) 120/74     Weight: 63.5 kg (140 lb)  Body mass index is 18.99 kg/m².    Intake/Output Summary  "(Last 24 hours) at 3/17/2024 0753  Last data filed at 3/16/2024 1841  Gross per 24 hour   Intake --   Output 350 ml   Net -350 ml         Physical Exam  Vitals and nursing note reviewed.   Constitutional:       Comments: Disheveled    HENT:      Head: Normocephalic.   Eyes:      General: No scleral icterus.     Extraocular Movements: Extraocular movements intact.   Cardiovascular:      Rate and Rhythm: Normal rate and regular rhythm.   Pulmonary:      Effort: No respiratory distress.      Breath sounds: Normal breath sounds. No wheezing or rales.   Abdominal:      General: There is no distension.      Palpations: Abdomen is soft.   Musculoskeletal:      Cervical back: Normal range of motion.   Skin:     General: Skin is warm and dry.      Findings: No rash.   Neurological:      Mental Status: He is alert.   Psychiatric:         Mood and Affect: Affect is labile.         Speech: Speech is delayed.         Behavior: Behavior is slowed.         Cognition and Memory: Cognition is impaired.      Comments: Oriented to person and place.             Significant Labs: All pertinent labs within the past 24 hours have been reviewed.    Significant Imaging: I have reviewed all pertinent imaging results/findings within the past 24 hours.    Assessment/Plan:      * Acute encephalopathy  Likely 2/2 to drug use, but underlying COVID+ infection and possible UTI may be contributing factors. Utox negative, but reported history of recent methamphetamine use.     -- Patient improving on mental status   -- PEC'ed patient prior to admission to .   -- Capacity evaluation QD  -- PRN Zyprexa for non-redirectable agitation / violence  -- Treat underlying infection  -- See Bipolar Disorder A/P for home medications  - Negative encephalopathy workup  - High risk of seizures, given prior history of stroke. "This is an abnormal continuous EEG monitoring study because of a moderate encephalopathy with evidence of cortical irritation and a " "potential seizure foci bilaterally, right>left.  There are no pushbutton activations and no electrographic seizures."   - Not on anti-epileptics as IP or at home  - Psychiatry following, at the moment not a candidate for IP psych placement given persistent delirium     Metabolic encephalopathy  EEG ruling out seizure activity. Because of persistent discharges on EEG and slowing background, findings are consistent with metabolic source of encephalopathy, in addition to patient's chronic use of methamphetamines.       Constipation  CTAP wc s/o moderate stool burden, diverticula w/o infection. Negative KUB.    - optimizing bowel regimen.       COVID  Resolved.     Acute cystitis without hematuria  -Resolved.     Bipolar disorder  -- Continue home medication regimen:  -venlafaxine  - carbamazepine 200 AM, 400 PM   - hold risperidone      Intermittent explosive disorder  Psychiatry following, patient with intermittent explosive disorder, and recommend maintaining current regimen, as well as PEC as he is confused. Current presentation consistent with encephalopathy rather than psychosis or vero. Psych not recommending Psych placement. Once he is back to baseline, they will reassess need for psych placement.       Type 1 diabetes mellitus with hyperglycemia  Patient's FSGs are uncontrolled due to hyperglycemia on current medication regimen.  Last A1c reviewed-   Lab Results   Component Value Date    HGBA1C 10.1 (H) 03/07/2024     Most recent fingerstick glucose reviewed-   Recent Labs   Lab 03/16/24  1139 03/16/24  1609 03/16/24 2027 03/16/24  2232   POCTGLUCOSE 300* 182* 164* 118*       Current correctional scale  Low    Antihyperglycemics (From admission, onward)      Start     Stop Route Frequency Ordered    03/16/24 1645  insulin aspart U-100 pen 5 Units         -- SubQ 3 times daily with meals 03/16/24 1302    03/16/24 0900  insulin detemir U-100 (Levemir) pen 8 Units         -- SubQ 2 times daily 03/16/24 0723    " 03/07/24 1649  insulin aspart U-100 pen 0-5 Units         -- SubQ Before meals & nightly PRN 03/07/24 1550            -- Hold oral hyperglycemics  -- SSI, POCT BG qACHS; titrate basal/bolus regimen PRN to 140-180 goal  -- Diabetic diet  - Detemir 10U BID / Aspart 3U TIDWM + LDSSI, hold ASPART if patient not eating his food (patiens it pocketing his food). SLP consulted.  - Current A1C 10.1. Reviewed previous A1C on 11.3  - patient non-compliant with insulin regimen at home     CAD (coronary artery disease)  Hyperlipidemia    Known CAD. Patient not exhibiting ACS symptoms at this time.     Last known LHC done 09/2023:  1.  60% in stent restenosis of previously placed right coronary artery stent.  2.  Successful balloon angioplasty with a 3 0 x 20 fall by 3.5 x 20 noncompliant balloon.  60% stenosis reduced to 0%.  YENI 3 flow.  No dissection.  3.  50% stenosis noted in the mid and distal left anterior descending artery.  There is a 70 % stenosis at the apex, small caliber.    -- Continue HI Statin  -- Continue ASA 81  -- Hold Plavix as patient's > 1 year from last PCI    COPD (chronic obstructive pulmonary disease)  Patient's COPD is controlled currently.  Patient is currently off COPD Pathway.     -PRN inhalers    Sepsis  Currently not having sepsis criteria. Positive nitrates on admission. Finished AB management.     Polysubstance use disorder  History of recent methamphetamine, tobacco, alcohol use. Likely contributor to psychiatric disorder and agitation      Hemiparesis affecting right side as late effect of stroke  Noted per history    Anemia of chronic disease  Patient's anemia is currently controlled. Has not received any PRBCs to date. Etiology likely d/t chronic disease due to recurrent infections, inflammation  Current CBC reviewed-   Lab Results   Component Value Date    HGB 13.3 (L) 03/16/2024    HCT 42.0 03/16/2024     Monitor serial CBC and transfuse if patient becomes hemodynamically unstable,  symptomatic or H/H drops below 7/21.    Primary hypertension  Chronic, controlled. Latest blood pressure and vitals reviewed-     Temp:  [97.4 °F (36.3 °C)-97.5 °F (36.4 °C)]   Pulse:  [75-98]   Resp:  [20]   BP: (116-143)/(74-90)   SpO2:  [94 %-96 %] .   Home meds for hypertension were reviewed and noted below.   Hypertension Medications               losartan (COZAAR) 50 MG tablet Take 1 tablet (50 mg total) by mouth once daily.    metoprolol succinate (TOPROL-XL) 25 MG 24 hr tablet Take 1 tablet (25 mg total) by mouth once daily.          - on losartan 50 and nifedipine 60 as IP   - PRN labetalol       VTE Risk Mitigation (From admission, onward)           Ordered     enoxaparin injection 40 mg  Daily         03/04/24 1904     IP VTE HIGH RISK PATIENT  Once         03/04/24 1904     Place sequential compression device  Until discontinued         03/04/24 1904                    Discharge Planning   ROLA: 3/19/2024     Code Status: Full Code   Is the patient medically ready for discharge?: No    Reason for patient still in hospital (select all that apply): Patient trending condition  Discharge Plan A: Psychiatric hospital   Discharge Delays: None known at this time              Kaleb Monson MD  Department of Hospital Medicine   [facility]

## 2024-03-17 NOTE — SUBJECTIVE & OBJECTIVE
Interval History: no new episodes of agitation. EEG without seizure activity. Nature of encephalopathy suggesting metabolic given the fact of chronic use of methamphetamines. Optimizing BG.     Review of Systems   Unable to perform ROS: Psychiatric disorder     Objective:     Vital Signs (Most Recent):  Temp: 97.5 °F (36.4 °C) (03/16/24 1608)  Pulse: 75 (03/17/24 0355)  Resp: 20 (03/16/24 2100)  BP: 120/74 (03/16/24 2100)  SpO2: 96 % (03/17/24 0500) Vital Signs (24h Range):  Temp:  [97.4 °F (36.3 °C)-97.5 °F (36.4 °C)] 97.5 °F (36.4 °C)  Pulse:  [75-98] 75  Resp:  [20] 20  SpO2:  [94 %-96 %] 96 %  BP: (116-143)/(74-90) 120/74     Weight: 63.5 kg (140 lb)  Body mass index is 18.99 kg/m².    Intake/Output Summary (Last 24 hours) at 3/17/2024 0753  Last data filed at 3/16/2024 1841  Gross per 24 hour   Intake --   Output 350 ml   Net -350 ml         Physical Exam  Vitals and nursing note reviewed.   Constitutional:       Comments: Disheveled    HENT:      Head: Normocephalic.   Eyes:      General: No scleral icterus.     Extraocular Movements: Extraocular movements intact.   Cardiovascular:      Rate and Rhythm: Normal rate and regular rhythm.   Pulmonary:      Effort: No respiratory distress.      Breath sounds: Normal breath sounds. No wheezing or rales.   Abdominal:      General: There is no distension.      Palpations: Abdomen is soft.   Musculoskeletal:      Cervical back: Normal range of motion.   Skin:     General: Skin is warm and dry.      Findings: No rash.   Neurological:      Mental Status: He is alert.   Psychiatric:         Mood and Affect: Affect is labile.         Speech: Speech is delayed.         Behavior: Behavior is slowed.         Cognition and Memory: Cognition is impaired.      Comments: Oriented to person and place.             Significant Labs: All pertinent labs within the past 24 hours have been reviewed.    Significant Imaging: I have reviewed all pertinent imaging results/findings within  the past 24 hours.

## 2024-03-17 NOTE — PLAN OF CARE
Pt took all scheduled night time medications. PRN medication given for cough. Sitter at bedside.         Problem: Infection  Goal: Absence of Infection Signs and Symptoms  Outcome: Ongoing, Progressing     Problem: Adult Inpatient Plan of Care  Goal: Plan of Care Review  Outcome: Ongoing, Progressing  Goal: Absence of Hospital-Acquired Illness or Injury  Outcome: Ongoing, Progressing  Goal: Optimal Comfort and Wellbeing  Outcome: Ongoing, Progressing  Goal: Readiness for Transition of Care  Outcome: Ongoing, Progressing     Problem: Skin Injury Risk Increased  Goal: Skin Health and Integrity  Outcome: Ongoing, Progressing     Problem: Adjustment to Illness (Sepsis/Septic Shock)  Goal: Optimal Coping  Outcome: Ongoing, Progressing     Problem: Fall Injury Risk  Goal: Absence of Fall and Fall-Related Injury  Outcome: Ongoing, Progressing

## 2024-03-17 NOTE — ASSESSMENT & PLAN NOTE
"Likely 2/2 to drug use, but underlying COVID+ infection and possible UTI may be contributing factors. Utox negative, but reported history of recent methamphetamine use.     -- Patient improving on mental status   -- PEC'ed patient prior to admission to .   -- Capacity evaluation QD  -- PRN Zyprexa for non-redirectable agitation / violence  -- Treat underlying infection  -- See Bipolar Disorder A/P for home medications  - Negative encephalopathy workup  - High risk of seizures, given prior history of stroke. "This is an abnormal continuous EEG monitoring study because of a moderate encephalopathy with evidence of cortical irritation and a potential seizure foci bilaterally, right>left.  There are no pushbutton activations and no electrographic seizures."   - Not on anti-epileptics as IP or at home  - Psychiatry following, at the moment not a candidate for IP psych placement given persistent delirium   "

## 2024-03-17 NOTE — ASSESSMENT & PLAN NOTE
EEG ruling out seizure activity. Because of persistent discharges on EEG and slowing background, findings are consistent with metabolic source of encephalopathy, in addition to patient's chronic use of methamphetamines.

## 2024-03-18 LAB
ALBUMIN SERPL BCP-MCNC: 3.4 G/DL (ref 3.5–5.2)
ALP SERPL-CCNC: 112 U/L (ref 55–135)
ALT SERPL W/O P-5'-P-CCNC: 29 U/L (ref 10–44)
ANION GAP SERPL CALC-SCNC: 12 MMOL/L (ref 8–16)
AST SERPL-CCNC: 22 U/L (ref 10–40)
BASOPHILS # BLD AUTO: 0.05 K/UL (ref 0–0.2)
BASOPHILS NFR BLD: 0.5 % (ref 0–1.9)
BILIRUB SERPL-MCNC: 0.3 MG/DL (ref 0.1–1)
BUN SERPL-MCNC: 25 MG/DL (ref 6–20)
CALCIUM SERPL-MCNC: 9.6 MG/DL (ref 8.7–10.5)
CHLORIDE SERPL-SCNC: 99 MMOL/L (ref 95–110)
CO2 SERPL-SCNC: 25 MMOL/L (ref 23–29)
CREAT SERPL-MCNC: 1 MG/DL (ref 0.5–1.4)
DIFFERENTIAL METHOD BLD: ABNORMAL
EOSINOPHIL # BLD AUTO: 0.3 K/UL (ref 0–0.5)
EOSINOPHIL NFR BLD: 3 % (ref 0–8)
ERYTHROCYTE [DISTWIDTH] IN BLOOD BY AUTOMATED COUNT: 14.1 % (ref 11.5–14.5)
EST. GFR  (NO RACE VARIABLE): >60 ML/MIN/1.73 M^2
GLUCOSE SERPL-MCNC: 121 MG/DL (ref 70–110)
HCT VFR BLD AUTO: 46 % (ref 40–54)
HGB BLD-MCNC: 14.1 G/DL (ref 14–18)
IMM GRANULOCYTES # BLD AUTO: 0.05 K/UL (ref 0–0.04)
IMM GRANULOCYTES NFR BLD AUTO: 0.5 % (ref 0–0.5)
LYMPHOCYTES # BLD AUTO: 1.7 K/UL (ref 1–4.8)
LYMPHOCYTES NFR BLD: 17.8 % (ref 18–48)
MCH RBC QN AUTO: 24.8 PG (ref 27–31)
MCHC RBC AUTO-ENTMCNC: 30.7 G/DL (ref 32–36)
MCV RBC AUTO: 81 FL (ref 82–98)
MONOCYTES # BLD AUTO: 1 K/UL (ref 0.3–1)
MONOCYTES NFR BLD: 10.8 % (ref 4–15)
NEUTROPHILS # BLD AUTO: 6.3 K/UL (ref 1.8–7.7)
NEUTROPHILS NFR BLD: 67.4 % (ref 38–73)
NRBC BLD-RTO: 0 /100 WBC
PLATELET # BLD AUTO: 364 K/UL (ref 150–450)
PMV BLD AUTO: 9.4 FL (ref 9.2–12.9)
POCT GLUCOSE: 128 MG/DL (ref 70–110)
POCT GLUCOSE: 145 MG/DL (ref 70–110)
POCT GLUCOSE: 195 MG/DL (ref 70–110)
POCT GLUCOSE: 73 MG/DL (ref 70–110)
POTASSIUM SERPL-SCNC: 4.9 MMOL/L (ref 3.5–5.1)
PROT SERPL-MCNC: 8 G/DL (ref 6–8.4)
RBC # BLD AUTO: 5.68 M/UL (ref 4.6–6.2)
SODIUM SERPL-SCNC: 136 MMOL/L (ref 136–145)
WBC # BLD AUTO: 9.3 K/UL (ref 3.9–12.7)

## 2024-03-18 PROCEDURE — 25000003 PHARM REV CODE 250: Performed by: STUDENT IN AN ORGANIZED HEALTH CARE EDUCATION/TRAINING PROGRAM

## 2024-03-18 PROCEDURE — 97110 THERAPEUTIC EXERCISES: CPT

## 2024-03-18 PROCEDURE — 25000003 PHARM REV CODE 250

## 2024-03-18 PROCEDURE — 21400001 HC TELEMETRY ROOM

## 2024-03-18 PROCEDURE — 80053 COMPREHEN METABOLIC PANEL: CPT

## 2024-03-18 PROCEDURE — 97112 NEUROMUSCULAR REEDUCATION: CPT | Mod: CQ

## 2024-03-18 PROCEDURE — 63600175 PHARM REV CODE 636 W HCPCS

## 2024-03-18 PROCEDURE — 85025 COMPLETE CBC W/AUTO DIFF WBC: CPT

## 2024-03-18 PROCEDURE — 25000003 PHARM REV CODE 250: Performed by: HOSPITALIST

## 2024-03-18 PROCEDURE — 36415 COLL VENOUS BLD VENIPUNCTURE: CPT

## 2024-03-18 RX ORDER — NIFEDIPINE 60 MG/1
60 TABLET, EXTENDED RELEASE ORAL DAILY
Qty: 30 TABLET | Refills: 3 | Status: SHIPPED | OUTPATIENT
Start: 2024-03-19 | End: 2025-03-19

## 2024-03-18 RX ORDER — VENLAFAXINE HYDROCHLORIDE 75 MG/1
75 CAPSULE, EXTENDED RELEASE ORAL DAILY
Qty: 30 CAPSULE | Refills: 3 | Status: SHIPPED | OUTPATIENT
Start: 2024-03-18 | End: 2025-03-18

## 2024-03-18 RX ORDER — LOSARTAN POTASSIUM 100 MG/1
100 TABLET ORAL DAILY
Qty: 90 TABLET | Refills: 3 | Status: SHIPPED | OUTPATIENT
Start: 2024-03-19 | End: 2025-03-19

## 2024-03-18 RX ORDER — INSULIN LISPRO 100 [IU]/ML
5 INJECTION, SOLUTION INTRAVENOUS; SUBCUTANEOUS
Qty: 3 ML | Refills: 2 | Status: SHIPPED | OUTPATIENT
Start: 2024-03-18

## 2024-03-18 RX ORDER — CARBAMAZEPINE 200 MG/1
400 TABLET ORAL NIGHTLY
Qty: 60 TABLET | Refills: 3 | Status: SHIPPED | OUTPATIENT
Start: 2024-03-18 | End: 2025-03-18

## 2024-03-18 RX ADMIN — INSULIN ASPART 5 UNITS: 100 INJECTION, SOLUTION INTRAVENOUS; SUBCUTANEOUS at 11:03

## 2024-03-18 RX ADMIN — Medication 100 MG: at 09:03

## 2024-03-18 RX ADMIN — CARBAMAZEPINE 200 MG: 200 TABLET ORAL at 09:03

## 2024-03-18 RX ADMIN — ENOXAPARIN SODIUM 40 MG: 40 INJECTION SUBCUTANEOUS at 04:03

## 2024-03-18 RX ADMIN — NIFEDIPINE 60 MG: 60 TABLET, FILM COATED, EXTENDED RELEASE ORAL at 09:03

## 2024-03-18 RX ADMIN — THERA TABS 1 TABLET: TAB at 09:03

## 2024-03-18 RX ADMIN — ASPIRIN 81 MG CHEWABLE TABLET 81 MG: 81 TABLET CHEWABLE at 09:03

## 2024-03-18 RX ADMIN — POLYETHYLENE GLYCOL 3350 17 G: 17 POWDER, FOR SOLUTION ORAL at 01:03

## 2024-03-18 RX ADMIN — FOLIC ACID 1 MG: 1 TABLET ORAL at 09:03

## 2024-03-18 RX ADMIN — LOSARTAN POTASSIUM 100 MG: 50 TABLET, FILM COATED ORAL at 09:03

## 2024-03-18 RX ADMIN — SENNOSIDES AND DOCUSATE SODIUM 2 TABLET: 8.6; 5 TABLET ORAL at 09:03

## 2024-03-18 RX ADMIN — INSULIN DETEMIR 10 UNITS: 100 INJECTION, SOLUTION SUBCUTANEOUS at 09:03

## 2024-03-18 RX ADMIN — ATORVASTATIN CALCIUM 20 MG: 20 TABLET, FILM COATED ORAL at 09:03

## 2024-03-18 RX ADMIN — INSULIN ASPART 5 UNITS: 100 INJECTION, SOLUTION INTRAVENOUS; SUBCUTANEOUS at 08:03

## 2024-03-18 RX ADMIN — VENLAFAXINE HYDROCHLORIDE 75 MG: 75 CAPSULE, EXTENDED RELEASE ORAL at 09:03

## 2024-03-18 RX ADMIN — POLYETHYLENE GLYCOL 3350 17 G: 17 POWDER, FOR SOLUTION ORAL at 09:03

## 2024-03-18 NOTE — ASSESSMENT & PLAN NOTE
Patient's FSGs are uncontrolled due to hyperglycemia on current medication regimen.  Last A1c reviewed-   Lab Results   Component Value Date    HGBA1C 10.1 (H) 03/07/2024     Most recent fingerstick glucose reviewed-   Recent Labs   Lab 03/17/24  1152 03/17/24  1710 03/17/24  1949 03/18/24  0740   POCTGLUCOSE 332* 271* 195* 128*       Current correctional scale  Low    Antihyperglycemics (From admission, onward)    Start     Stop Route Frequency Ordered    03/17/24 2100  insulin detemir U-100 (Levemir) pen 10 Units         -- SubQ 2 times daily 03/17/24 1003    03/16/24 1645  insulin aspart U-100 pen 5 Units         -- SubQ 3 times daily with meals 03/16/24 1302    03/07/24 1649  insulin aspart U-100 pen 0-5 Units         -- SubQ Before meals & nightly PRN 03/07/24 1550          -- Hold oral hyperglycemics  -- SSI, POCT BG qACHS; titrate basal/bolus regimen PRN to 140-180 goal  -- Diabetic diet  - Detemir 10U BID / Aspart 3U TIDWM + LDSSI, hold ASPART if patient not eating his food (patiens it pocketing his food). SLP consulted.  - Current A1C 10.1. Reviewed previous A1C on 11.3  - patient non-compliant with insulin regimen at home

## 2024-03-18 NOTE — SUBJECTIVE & OBJECTIVE
Interval History: NAEON. AFVSS. Patient alert and oriented this morning but minimally interactive with history cooperation. Denies any physical symptoms and reports normal appetite. Labs stable. BG better controlled this AM at 195, will continue to monitor throughout afternoon. Continuing CEC, expires tomorrow.     Review of Systems  Objective:     Vital Signs (Most Recent):  Temp: 97.1 °F (36.2 °C) (03/18/24 1032)  Pulse: 90 (03/18/24 1130)  Resp: 16 (03/18/24 1032)  BP: 130/73 (03/18/24 1032)  SpO2: (!) 93 % (03/18/24 1032) Vital Signs (24h Range):  Temp:  [96.3 °F (35.7 °C)-98 °F (36.7 °C)] 97.1 °F (36.2 °C)  Pulse:  [70-90] 90  Resp:  [16-18] 16  SpO2:  [92 %-95 %] 93 %  BP: (123-161)/(70-85) 130/73     Weight: 63.5 kg (140 lb)  Body mass index is 18.99 kg/m².    Intake/Output Summary (Last 24 hours) at 3/18/2024 1356  Last data filed at 3/17/2024 2000  Gross per 24 hour   Intake 50 ml   Output 50 ml   Net 0 ml           Physical Exam  Vitals and nursing note reviewed.   Constitutional:       Comments: Disheveled    HENT:      Head: Normocephalic.   Eyes:      General: No scleral icterus.     Extraocular Movements: Extraocular movements intact.   Cardiovascular:      Rate and Rhythm: Normal rate and regular rhythm.   Pulmonary:      Effort: No respiratory distress.      Breath sounds: Normal breath sounds. No wheezing or rales.   Abdominal:      General: There is no distension.      Palpations: Abdomen is soft.   Musculoskeletal:      Cervical back: Normal range of motion.   Skin:     General: Skin is warm and dry.      Findings: No rash.   Neurological:      Mental Status: He is alert.   Psychiatric:         Mood and Affect: Affect is labile.         Speech: Speech is delayed.         Behavior: Behavior is slowed.         Cognition and Memory: Cognition is impaired.      Comments: Oriented to person and place.             Significant Labs: All pertinent labs within the past 24 hours have been  reviewed.    Significant Imaging: I have reviewed all pertinent imaging results/findings within the past 24 hours.

## 2024-03-18 NOTE — PLAN OF CARE
Spoke with patient's sister Amarilis to get choice home health agency. Sister states that they have no preference and to just send out referrals to see who will accept patient.      Suresh Lipscomb, RNCM  Case Management  [phone]

## 2024-03-18 NOTE — PLAN OF CARE
03/18/24 1628   Post-Acute Status   Post-Acute Authorization Home Health   Home Health Status Referrals Sent   Coverage [facility] [employer]   Discharge Delays None known at this time   Discharge Plan   Discharge Plan A Home Health   Discharge Plan B Home Health     Spoke with patient's sister to review discharge recommendation of home health and patient is agreeable to plan    Patient/family provided list of facilities in-network with patient's payor plan. Providers that are owned, operated, or affiliated with [facility] are included on the list.     Notified that referral sent to below listed facilities from in-network list based on proximity to home/family support:   [facility]  2.   [facility]  3.   [facility]  4.   [facility]  5.   [facility]     Patient/family instructed to identify preference.    Preferred Facility: (if more than 1, listed in order of descending preference)  No preference given    If an additional preferred facility not listed above is identified, additional referral to be sent. If above facilities unable to accept, will send additional referrals to in-network providers.   Discharge Plan A and Plan B have been determined by review of patient's clinical status, future medical and therapeutic needs, and coverage/benefits for post-acute care in coordination with multidisciplinary team members.      Suresh Lipscomb, RITACM  Case Management  [phone]

## 2024-03-18 NOTE — PT/OT/SLP PROGRESS
Physical Therapy Treatment    Patient Name:  Cali Mabry   MRN:  [id number]    Recommendations:     Discharge Recommendations: Moderate Intensity Therapy  Discharge Equipment Recommendations: hospital bed  Barriers to discharge: Decreased caregiver support    Assessment:     Cali Mabry is a 58 y.o. male admitted with a medical diagnosis of Acute encephalopathy.  He presents with the following impairments/functional limitations: weakness, impaired endurance, impaired functional mobility, impaired balance, impaired cognition, decreased safety awareness, impaired coordination.    Patient requires a hospital bed for positioning of the body in ways that are not feasible with an ordinary bed. The patient requires special positioning for pain relief, limited mobility, and/or being unable to independently make changes in body position without the use of a hospital bed. Pillows and wedges will not be adequate for resolving these positional issues.     Rehab Prognosis: Fair; patient would benefit from acute skilled PT services to address these deficits and reach maximum level of function.    Recent Surgery: * No surgery found *      Plan:     During this hospitalization, patient to be seen 3 x/week to address the identified rehab impairments via gait training, therapeutic activities, therapeutic exercises, neuromuscular re-education, wheelchair management/training and progress toward the following goals:    Plan of Care Expires:  04/11/24    Subjective     Chief Complaint: pt repeating therapist's phrases  Patient/Family Comments/goals: none  Pain/Comfort:  Pain Rating 1: 0/10      Objective:     Communicated with RN prior to session.  Patient found HOB elevated with   upon PT entry to room.     General Precautions: Standard, aspiration, fall  Orthopedic Precautions: N/A  Braces: N/A  Respiratory Status: Room air     Functional Mobility:  Bed Mobility:     Supine to Sit: total assistance and of 2 persons  Sit to  "Supine: total assistance and of 2 persons      AM-PAC 6 CLICK MOBILITY  Turning over in bed (including adjusting bedclothes, sheets and blankets)?: 2  Sitting down on and standing up from a chair with arms (e.g., wheelchair, bedside commode, etc.): 1  Moving from lying on back to sitting on the side of the bed?: 2  Moving to and from a bed to a chair (including a wheelchair)?: 1  Need to walk in hospital room?: 1  Climbing 3-5 steps with a railing?: 1  Basic Mobility Total Score: 8       Treatment & Education:  While sitting EOB pt became frustrated with OT instruction to participate in UE therex. Pt said, "put me back in bed."   Bedside table in front of patient and area set up for function, convenience, and safety. RN aware of patient's mobility needs and status. Questions/concerns addressed within PTA scope of practice; patient  with no further questions. Time was provided for active listening, discussion of health disposition, and discussion of safe discharge.    Patient left HOB elevated with all lines intact, call button in reach, and sitter present..    GOALS:   Multidisciplinary Problems       Physical Therapy Goals          Problem: Physical Therapy    Goal Priority Disciplines Outcome Goal Variances Interventions   Physical Therapy Goal     PT, PT/OT Ongoing, Progressing     Description: Goals to be met by: 2024     Patient will increase functional independence with mobility by performin. Supine to sit with Moderate Assistance  2. Sit to supine with Moderate Assistance  3. Sit to stand transfer with Moderate Assistance  4. Bed to chair transfer with Moderate Assistance using Rolling Walker  5. Gait  x 10 feet with Moderate Assistance using Rolling Walker.   6. Sitting at edge of bed x10 minutes with Minimal Assistance  7. Lower extremity exercise program x30 reps per handout, with assistance as needed  8. Propel wheelchair 40 feet using Bilateral upper extremities and minimal assistance      "                    Time Tracking:     PT Received On: 03/18/24  PT Start Time: 1200     PT Stop Time: 1210  PT Total Time (min): 10 min     Billable Minutes: Neuromuscular Re-education 10    Treatment Type: Treatment  PT/PTA: PTA     Number of PTA visits since last PT visit: 1 03/18/2024

## 2024-03-18 NOTE — ASSESSMENT & PLAN NOTE
"Likely 2/2 to drug use, but underlying COVID+ infection and possible UTI may be contributing factors. Utox negative, but reported history of recent methamphetamine use.     -- Patient improving on mental status   -- PEC'ed patient prior to admission to . (Expires 03/19)  -- Capacity evaluation QD  -- PRN Zyprexa for non-redirectable agitation / violence  -- Treat underlying infection  -- See Bipolar Disorder A/P for home medications  - Negative encephalopathy workup  - High risk of seizures, given prior history of stroke. "This is an abnormal continuous EEG monitoring study because of a moderate encephalopathy with evidence of cortical irritation and a potential seizure foci bilaterally, right>left.  There are no pushbutton activations and no electrographic seizures."   - Not on anti-epileptics as IP or at home  - Psychiatry following, at the moment not a candidate for IP psych placement given persistent delirium   " contact guard

## 2024-03-18 NOTE — PROGRESS NOTES
"CONSULTATION LIAISON PSYCHIATRY PROGRESS NOTE    Patient Name: Cali Mabry  MRN: [id number]  Patient Class: IP- Inpatient  Admission Date: 3/4/2024  Attending Physician: Doroteo Vyas MD    HPI:  58M with past psychiatric history of intermittent explosive disorder 2/2 TBI v CVA, Bipolar Disorder, Major Neurocognitive Disorder, MDD, IVDU, and stimulant use disorder & past pertinent medical history of acute on chronic pancreatitis, CHF, T1DM, and CVA presents to the ED reportedly via EMS for AMS. Admitted for Acute Encephalopathy management.     Consulted for "patient with history of Bipolar and substance use disorder, currently PEC'd. Consult for psych evaluation and need for placement in psych facility?" PEC placed in ED due to combative behavior and confusion. Patient was found to be COVID+. UDS neg    SUBJECTIVE:     Patient had no acute events overnight. He did not require any prn medications for agitation or aggression. He was compliant with scheduled medications. His vital signs were stable.     Today, patient is greeted at the bedside. The sitter is in place near the entrance of the room. The patient is laying in bed with his eyes closed, initially sleeping. He respondes to prompting by the interviewer to engage in the psychiatric assessment. When asked how he is doing today he states "my gut hurts". He states he is sleeping well but is not  eating well due to stomach pain. He asks for water and takes a few sips through a straw. He denies having feelings of depression, anxiety, suicidal ideation, homicidal ideation, auditory or visual hallucinations. He does endorse confusions. When asking questions regarding orientation, he is unable to state where he is, what date it is, and says "I am still confused".     OBJECTIVE:    Mental Status Exam:  General Appearance: dressed in hospital garb, lying in bed, thin and gaunt  Behavior: under good behavioral control, minimal responses, poor eye " "contact  Involuntary Movements and Motor Activity: no abnormal involuntary movements noted  Gait and Station: unable to assess - patient lying down or seated  Speech and Language: decreased spontaneity, increased latency of response, monotone, responds to questions minimally/briefly  Mood: "My gut hurts"  Affect: blunted  Thought Process and Associations: linear, logical  Thought Content and Perceptions:: no suicidal or homicidal ideation, no auditory or visual hallucinations, no paranoid ideation, no ideas of reference, no evidence of delusions or psychosis, Denied SI; does not appear to be +RIS  Sensorium and Orientation: oriented to person only  Recent and Remote Memory: Unable to  Formally Assess  Attention and Concentration: Unable to Formally Assess  Fund of Knowledge: Unable to Formally Assess  Insight: limited/partial awareness of illness  Judgment: limited    CAM ICU positive? Unable to assess    AIMS: Score 0/36  Abnormal Involuntary Movement Scale  0-4   Muscles of Facial Expression  0   Lips and Perioral Area  0   Jaw  0   Tongue  0   Upper (arms, wrists, hands, fingers)  0    Lower (legs, knees, ankles, toes)  0   Neck, shoulders, hips  0   Severity of abnormal movements (highest score from questions above)  0    Incapacitation due to abnormal movements  0    Patient's awareness of abnormal movements (rate only patient's report)  0   Current problems with teeth and/or dentures?  No    Does patient usually wear dentures?  No       ASSESSMENT & RECOMMENDATIONS   Acute Encephalopathy              R/O NPH  Intermittent Explosive Disorder  Hx TBI  Hx CVA  Hx Unspecified Neurocognitive Disorder  Hx Bipolar Disorder  Hx MDD     Agitation improved. Patient has been irritable but has not needed PRN medications for non-redirectable agitation or aggression for over 1 week. Still only oriented to person.   Continue to HOLD both Risperdal 1 mg PO BID & Zyprexa (Zydis) 5 mg PO nightly at this time due to concerns of " EPS & excessive sedation.  Continue Tegretol home dose 200 mg daily and 400 mg nightly  Continue Effexor XR 75 mg PO daily  PRN Zyprexa 5 mg PO/IM TID for non-redirectable agitation associated with breakthrough psychosis or vero    DELIRIUM BEHAVIOR MANAGEMENT  PLEASE utilize CHEMICAL restraints with PRN meds first for agitation. Minimize use of PHYSICAL restraints OR have periods of being out of physical restraints if possible.  Keep window shades open and room lit during day and room dim at night in order to promote normal sleep-wake cycles  Encourage family at bedside. [place] patient often to situation, location, date.  Continue to Limit or Discontinue use of Narcotics, Benzos and Anti-cholinergic medications as they may worsen delirium.  Continue medical workup for causative etiology of Delirium.        RISK ASSESSMENT  Continue CEC because patient is in imminent danger of hurting self or others and is gravely disabled. & NEEDS 1:1 sitter.  CEC exp 3/19/24 @ 1613, will discuss with primary team whether [place] needs to be filed or there is no further need for the patient to be under a legal status     FOLLOW UP  Will follow up while in house     DISPOSITION - once medically cleared:   Will reassess daily, warrants continued medical work-up at this time    Please contact ON CALL psychiatry service (24/7) for any acute issues that may arise.    Dr. Jamarcus Borwning MD  [facility] PGY-II    Psychiatry  [facility]  3/18/2024 0940     --------------------------------------------------------------------------------------------------------------------------------------------------------------------------------------------------------------------------------------    CONTINUED OBJECTIVE clinical data & findings reviewed and noted for above decision making    Current Medications:   Scheduled Meds:    aspirin  81 mg Oral Daily    atorvastatin  20 mg Oral Daily    carBAMazepine  200 mg Oral  Daily    carBAMazepine  400 mg Oral QHS    enoxparin  40 mg Subcutaneous Daily    folic acid  1 mg Oral Daily    insulin aspart U-100  5 Units Subcutaneous TIDWM    insulin detemir U-100  10 Units Subcutaneous BID    losartan  100 mg Oral Daily    melatonin  6 mg Oral Nightly    multivitamin  1 tablet Oral Daily    NIFEdipine  60 mg Oral Daily    polyethylene glycol  17 g Oral Q6H WAKE    senna-docusate 8.6-50 mg  2 tablet Oral BID    thiamine  100 mg Oral Daily    venlafaxine  75 mg Oral Daily     PRN Meds: albuterol, benzonatate, dextromethorphan-guaiFENesin  mg/5 ml, dextrose 10%, dextrose 10%, glucagon (human recombinant), glucose, glucose, insulin aspart U-100, labetalol, naloxone, OLANZapine, ondansetron, sodium chloride 0.9%    Allergies:   Review of patient's allergies indicates:  No Known Allergies    Vitals  Vitals:    03/18/24 0739   BP: (!) 147/84   Pulse: 80   Resp: 16   Temp: 96.3 °F (35.7 °C)       Labs/Imaging/Studies:  Recent Results (from the past 24 hour(s))   POCT glucose    Collection Time: 03/17/24 11:52 AM   Result Value Ref Range    POCT Glucose 332 (H) 70 - 110 mg/dL   POCT glucose    Collection Time: 03/17/24  5:10 PM   Result Value Ref Range    POCT Glucose 271 (H) 70 - 110 mg/dL   POCT glucose    Collection Time: 03/17/24  7:49 PM   Result Value Ref Range    POCT Glucose 195 (H) 70 - 110 mg/dL   Comprehensive metabolic panel    Collection Time: 03/18/24  7:32 AM   Result Value Ref Range    Sodium 136 136 - 145 mmol/L    Potassium 4.9 3.5 - 5.1 mmol/L    Chloride 99 95 - 110 mmol/L    CO2 25 23 - 29 mmol/L    Glucose 121 (H) 70 - 110 mg/dL    BUN 25 (H) 6 - 20 mg/dL    Creatinine 1.0 0.5 - 1.4 mg/dL    Calcium 9.6 8.7 - 10.5 mg/dL    Total Protein 8.0 6.0 - 8.4 g/dL    Albumin 3.4 (L) 3.5 - 5.2 g/dL    Total Bilirubin 0.3 0.1 - 1.0 mg/dL    Alkaline Phosphatase 112 55 - 135 U/L    AST 22 10 - 40 U/L    ALT 29 10 - 44 U/L    eGFR >60.0 >60 mL/min/1.73 m^2    Anion Gap 12 8 - 16 mmol/L    CBC auto differential    Collection Time: 03/18/24  7:32 AM   Result Value Ref Range    WBC 9.30 3.90 - 12.70 K/uL    RBC 5.68 4.60 - 6.20 M/uL    Hemoglobin 14.1 14.0 - 18.0 g/dL    Hematocrit 46.0 40.0 - 54.0 %    MCV 81 (L) 82 - 98 fL    MCH 24.8 (L) 27.0 - 31.0 pg    MCHC 30.7 (L) 32.0 - 36.0 g/dL    RDW 14.1 11.5 - 14.5 %    Platelets 364 150 - 450 K/uL    MPV 9.4 9.2 - 12.9 fL    Immature Granulocytes 0.5 0.0 - 0.5 %    Gran # (ANC) 6.3 1.8 - 7.7 K/uL    Immature Grans (Abs) 0.05 (H) 0.00 - 0.04 K/uL    Lymph # 1.7 1.0 - 4.8 K/uL    Mono # 1.0 0.3 - 1.0 K/uL    Eos # 0.3 0.0 - 0.5 K/uL    Baso # 0.05 0.00 - 0.20 K/uL    nRBC 0 0 /100 WBC    Gran % 67.4 38.0 - 73.0 %    Lymph % 17.8 (L) 18.0 - 48.0 %    Mono % 10.8 4.0 - 15.0 %    Eosinophil % 3.0 0.0 - 8.0 %    Basophil % 0.5 0.0 - 1.9 %    Differential Method Automated    POCT glucose    Collection Time: 03/18/24  7:40 AM   Result Value Ref Range    POCT Glucose 128 (H) 70 - 110 mg/dL     Imaging Results              CT Head Without Contrast (Final result)  Result time 03/04/24 16:55:45      Final result by Jered Cheatham MD (03/04/24 16:55:45)                   Impression:      No evidence of acute hemorrhage or major vascular distribution infarct.    Chronic small vessel ischemic change with remote right occipital and left cerebellar infarcts.    Ventriculomegaly out of proportion to the degree of sulcal enlargement, similar to prior exams.  While this could relate to cerebral volume loss, the configuration does raise the possibility of normal pressure hydrocephalus. Clinical correlation required.      Electronically signed by: Jered Cheatham MD  Date:    03/04/2024  Time:    16:55               Narrative:    EXAMINATION:  CT HEAD WITHOUT CONTRAST    CLINICAL HISTORY:  Mental status change, unknown cause;    TECHNIQUE:  Low dose axial CT images obtained throughout the head without the use of intravenous contrast.  Axial, sagittal and coronal  reconstructions were performed.    COMPARISON:  12/10/2023.    FINDINGS:  Intracranial compartment:    Ventricles are enlarged, similar to prior.  Third ventricle diameter again measuring up to 1.4 cm.  Ventricular enlargement out of proportion of degree of sulcal enlargement.    Chronic small vessel ischemic change in the supratentorial white matter.  Remote right occipital infarct.  Remote left cerebellar infarct.  No acute major vascular distribution infarct.  No acute parenchymal hemorrhage.  No new intracranial mass effect or midline shift.    No extra-axial blood or fluid collections.    Skull/extracranial contents (limited evaluation):    No displaced calvarial fracture.    Mastoid air cells are clear. Mild patchy mucosal thickening in the visualized paranasal sinuses.                                       CT Abdomen Pelvis With IV Contrast NO Oral Contrast (Final result)  Result time 03/04/24 18:05:52      Final result by New Falcon MD (03/04/24 18:05:52)                   Impression:      Moderate colonic stool burden with mild wall thickening of the rectum, correlate with signs of constipation or proctitis.    Diffuse bladder wall thickening, correlate with urinalysis and symptoms of cystitis.    Diverticulosis coli without evidence of acute diverticulitis    Stable 5 mm right middle lobe solid pulmonary micronodule.    Additional findings as above.    Electronically signed by resident: Marsha Cardona  Date:    03/04/2024  Time:    17:27    Electronically signed by: New Falcon MD  Date:    03/04/2024  Time:    18:05               Narrative:    EXAMINATION:  CT ABDOMEN PELVIS WITH IV CONTRAST    CLINICAL HISTORY:  Abdominal abscess/infection suspected;    TECHNIQUE:  Axial images of the abdomen and pelvis were acquired after administration of 75 cc Omnipaque 350 IV contrast. No oral contrast was administered.  Coronal and sagittal reconstructions were also obtained.    COMPARISON:  CT 12/12/2023 and  07/26/2023.    FINDINGS:  Examination degraded by patient motion artifact.    Heart: Normal heart size.  No pericardial fluid.  No significant calcific coronary atherosclerosis.    Lungs: Evaluation of the lung bases limited due to respiratory motion.  Allowing for this, stable linear bandlike opacities of the right lung base suggestive of atelectasis/scarring.  Stable 5 mm right middle lobe solid pulmonary micronodule (2-7).  No new focal consolidation.  No pleural effusion or pneumothorax.    Liver: Normal in size and contour.  No focal hepatic lesion.    Gallbladder: No calcified gallstones.    Bile Ducts: No biliary ductal dilatation.    Pancreas: No mass or peripancreatic fat stranding.    Spleen: Spleen is upper limits of normal in size measuring 12 cm.    Adrenals: No significant abnormalities.    Kidneys/Ureters: Normal in size and location. Normal enhancement. No hydronephrosis or nephrolithiasis. No ureteral dilatation.    Bladder: Diffuse bladder wall thickening.    Reproductive organs: Prostate is unremarkable.    Peritoneum: No free air or free fluid.    Retroperitoneum: No pathologically enlarged retroperitoneal lymph nodes.    Bowel/Mesentery: Small hiatal hernia.  Stomach is otherwise unremarkable.  Small bowel is normal in caliber without evidence of inflammation or obstruction.  Moderate colonic stool burden.  Mild wall thickening of the rectum with large fecal burden, component of proctitis to be considered.  Scattered colonic diverticuli.  Appendix not definitively visualized.  No steve-cecal soft tissue stranding.    Abdominal wall:  Small fat containing umbilical hernia. 4.7 x 1.8 cm fat containing lesion along the left anterolateral chest wall, favored to represent a lipoma.    Vasculature: Mild calcific atherosclerosis of the abdominal aorta and its branches.  No aneurysm.    Bones: Mild degenerative changes spine.  Prominent Schmorl's node at L1, unchanged.  Healing left anterolateral rib  fractures.  No acute fractures.  Lucent lesion within the right femoral neck, stable dating back to multiple priors.                                       X-Ray Chest AP Portable (Final result)  Result time 03/04/24 13:37:35      Final result by Cullen Bush MD (03/04/24 13:37:35)                   Impression:      See above      Electronically signed by: Cullen Bush MD  Date:    03/04/2024  Time:    13:37               Narrative:    EXAMINATION:  XR CHEST AP PORTABLE    CLINICAL HISTORY:  Sepsis;    TECHNIQUE:  Single frontal view of the chest was performed.    COMPARISON:  None 12/22/2023    FINDINGS:  Heart size normal.  The lungs are clear.  No pleural effusion

## 2024-03-18 NOTE — ASSESSMENT & PLAN NOTE
Chronic, controlled. Latest blood pressure and vitals reviewed-     Temp:  [96.3 °F (35.7 °C)-98 °F (36.7 °C)]   Pulse:  [70-92]   Resp:  [16-20]   BP: (102-161)/(69-85)   SpO2:  [92 %-95 %] .   Home meds for hypertension were reviewed and noted below.   Hypertension Medications               losartan (COZAAR) 50 MG tablet Take 1 tablet (50 mg total) by mouth once daily.    metoprolol succinate (TOPROL-XL) 25 MG 24 hr tablet Take 1 tablet (25 mg total) by mouth once daily.          - on losartan 50 and nifedipine 60 as IP   - PRN labetalol

## 2024-03-18 NOTE — SUBJECTIVE & OBJECTIVE
Interval History: NAEON. AFVSS. Patient alert and oriented this morning but minimally interactive with history cooperation. Denies any physical symptoms and reports normal appetite. Labs stable. BG better controlled this AM at 195, will continue to monitor throughout afternoon. Continuing CEC, expires tomorrow.     Review of Systems  Objective:     Vital Signs (Most Recent):  Temp: 96.3 °F (35.7 °C) (03/18/24 0739)  Pulse: 80 (03/18/24 0739)  Resp: 16 (03/18/24 0739)  BP: (!) 147/84 (03/18/24 0739)  SpO2: (!) 94 % (03/18/24 0739) Vital Signs (24h Range):  Temp:  [96.3 °F (35.7 °C)-98 °F (36.7 °C)] 96.3 °F (35.7 °C)  Pulse:  [70-92] 80  Resp:  [16-20] 16  SpO2:  [92 %-95 %] 94 %  BP: (102-161)/(69-85) 147/84     Weight: 63.5 kg (140 lb)  Body mass index is 18.99 kg/m².    Intake/Output Summary (Last 24 hours) at 3/18/2024 0850  Last data filed at 3/17/2024 2000  Gross per 24 hour   Intake 50 ml   Output 50 ml   Net 0 ml         Physical Exam  Vitals and nursing note reviewed.   Constitutional:       Comments: Disheveled    HENT:      Head: Normocephalic.   Eyes:      General: No scleral icterus.     Extraocular Movements: Extraocular movements intact.   Cardiovascular:      Rate and Rhythm: Normal rate and regular rhythm.   Pulmonary:      Effort: No respiratory distress.      Breath sounds: Normal breath sounds. No wheezing or rales.   Abdominal:      General: There is no distension.      Palpations: Abdomen is soft.   Musculoskeletal:      Cervical back: Normal range of motion.   Skin:     General: Skin is warm and dry.      Findings: No rash.   Neurological:      Mental Status: He is alert.   Psychiatric:         Mood and Affect: Affect is labile.         Speech: Speech is delayed.         Behavior: Behavior is slowed.         Cognition and Memory: Cognition is impaired.      Comments: Oriented to person and place.             Significant Labs: All pertinent labs within the past 24 hours have been  reviewed.    Significant Imaging: I have reviewed all pertinent imaging results/findings within the past 24 hours.

## 2024-03-18 NOTE — PLAN OF CARE
Problem: Infection  Goal: Absence of Infection Signs and Symptoms  Outcome: Ongoing, Progressing  Intervention: Prevent or Manage Infection  Flowsheets (Taken 3/18/2024 0430)  Fever Reduction/Comfort Measures:   lightweight clothing   lightweight bedding  Infection Management: aseptic technique maintained  Isolation Precautions:   precautions maintained   airborne   contact     Problem: Adult Inpatient Plan of Care  Goal: Plan of Care Review  Outcome: Ongoing, Progressing  Flowsheets (Taken 3/18/2024 0430)  Plan of Care Reviewed With: patient  Goal: Patient-Specific Goal (Individualized)  Outcome: Ongoing, Progressing  Flowsheets (Taken 3/18/2024 0430)  Anxieties, Fears or Concerns: none  Individualized Care Needs: airborne and contact precaution, fall prevention, CEC  Goal: Absence of Hospital-Acquired Illness or Injury  Outcome: Ongoing, Progressing  Intervention: Identify and Manage Fall Risk  Flowsheets (Taken 3/18/2024 0430)  Safety Promotion/Fall Prevention:   /camera at bedside   assistive device/personal item within reach   bed alarm set   lighting adjusted   medications reviewed  Intervention: Prevent Skin Injury  Flowsheets (Taken 3/18/2024 0430)  Body Position: turned  Skin Protection:   adhesive use limited   drying agents applied  Intervention: Prevent and Manage VTE (Venous Thromboembolism) Risk  Flowsheets (Taken 3/18/2024 0430)  Activity Management: Arm raise - L1  VTE Prevention/Management:   bleeding precations maintained   bleeding risk assessed  Range of Motion: ROM (range of motion) performed  Intervention: Prevent Infection  Flowsheets (Taken 3/18/2024 0430)  Infection Prevention:   cohorting utilized   environmental surveillance performed   equipment surfaces disinfected   hand hygiene promoted   personal protective equipment utilized   rest/sleep promoted   single patient room provided   visitors restricted/screened  Goal: Optimal Comfort and Wellbeing  Outcome: Ongoing,  Progressing  Intervention: Monitor Pain and Promote Comfort  Flowsheets (Taken 3/18/2024 0430)  Pain Management Interventions:   care clustered   breathing exercises utilized   quiet environment facilitated   relaxation techniques promoted     Problem: Diabetes Comorbidity  Goal: Blood Glucose Level Within Targeted Range  Outcome: Ongoing, Progressing  Intervention: Monitor and Manage Glycemia  Flowsheets (Taken 3/18/2024 0430)  Glycemic Management:   blood glucose monitored   supplemental insulin given     Problem: Impaired Wound Healing  Goal: Optimal Wound Healing  Outcome: Ongoing, Progressing  Intervention: Promote Wound Healing  Flowsheets (Taken 3/18/2024 0430)  Oral Nutrition Promotion: rest periods promoted  Sleep/Rest Enhancement:   awakenings minimized   relaxation techniques promoted  Activity Management: Arm raise - L1  Pain Management Interventions:   care clustered   breathing exercises utilized   quiet environment facilitated   relaxation techniques promoted     Problem: Skin Injury Risk Increased  Goal: Skin Health and Integrity  Outcome: Ongoing, Progressing  Intervention: Optimize Skin Protection  Flowsheets (Taken 3/18/2024 0430)  Pressure Reduction Devices:   positioning supports utilized   specialty bed utilized  Skin Protection:   adhesive use limited   drying agents applied  Head of Bed (HOB) Positioning: HOB elevated  Intervention: Promote and Optimize Oral Intake  Flowsheets (Taken 3/18/2024 0430)  Oral Nutrition Promotion: rest periods promoted     Problem: Adjustment to Illness (Sepsis/Septic Shock)  Goal: Optimal Coping  Outcome: Ongoing, Progressing  Intervention: Optimize Psychosocial Adjustment to Illness  Flowsheets (Taken 3/18/2024 0430)  Supportive Measures:   active listening utilized   relaxation techniques promoted   verbalization of feelings encouraged  Family/Support System Care: self-care encouraged     Problem: Bleeding (Sepsis/Septic Shock)  Goal: Absence of  Bleeding  Outcome: Ongoing, Progressing     Problem: Glycemic Control Impaired (Sepsis/Septic Shock)  Goal: Blood Glucose Level Within Desired Range  Outcome: Ongoing, Progressing  Intervention: Optimize Glycemic Control  Flowsheets (Taken 3/18/2024 0430)  Glycemic Management:   blood glucose monitored   supplemental insulin given     Problem: Infection Progression (Sepsis/Septic Shock)  Goal: Absence of Infection Signs and Symptoms  Outcome: Ongoing, Progressing  Intervention: Initiate Sepsis Management  Flowsheets (Taken 3/18/2024 0430)  Infection Prevention:   cohorting utilized   environmental surveillance performed   equipment surfaces disinfected   hand hygiene promoted   personal protective equipment utilized   rest/sleep promoted   single patient room provided   visitors restricted/screened  Infection Management: aseptic technique maintained  Isolation Precautions:   precautions maintained   airborne   contact  Intervention: Promote Stabilization  Flowsheets (Taken 3/18/2024 0430)  Fever Reduction/Comfort Measures:   lightweight clothing   lightweight bedding     Problem: Fall Injury Risk  Goal: Absence of Fall and Fall-Related Injury  Outcome: Ongoing, Progressing  Intervention: Identify and Manage Contributors  Flowsheets (Taken 3/18/2024 0430)  Self-Care Promotion: BADL personal objects within reach  Medication Review/Management: medications reviewed  Intervention: Promote Injury-Free Environment  Flowsheets (Taken 3/18/2024 0430)  Safety Promotion/Fall Prevention:   /camera at bedside   assistive device/personal item within reach   bed alarm set   lighting adjusted   medications reviewed     Problem: Adult Inpatient Plan of Care  Goal: Readiness for Transition of Care  Outcome: Ongoing, Not Progressing     Problem: Violence Risk or Actual  Goal: Anger and Impulse Control  Outcome: Ongoing, Not Progressing  Intervention: Minimize Safety Risk  Flowsheets (Taken 3/18/2024 0430)  Behavior  Management: boundaries reinforced  Sensory Stimulation Regulation:   quiet environment promoted   lighting decreased   care clustered  Enhanced Safety Measures:    at bedside   bed alarm set     Problem: Restraint, Nonbehavioral (Nonviolent)  Goal: Absence of Harm or Injury  Outcome: Met

## 2024-03-18 NOTE — PROGRESS NOTES
[facility] Medicine  Progress Note    Patient Name: Cali Mabry  MRN: [id number]  Patient Class: IP- Inpatient   Admission Date: 3/4/2024  Length of Stay: 14 days  Attending Physician: Doroteo yVas MD  Primary Care Provider: Lawanda Flores NP        Subjective:     Principal Problem:Acute encephalopathy        HPI:  58 M with DM1, extensive IVDU, HCV Ab+, HTN, previous CVA with R sided deficits, HFpEF, CAD, chronic pancreatitis, mixed mood / psychiatric disorder [place] [facility] [place] with chief complaint of weakness of unspecified duration and onset. Admits to using shooting methamphetamines. Rest of history from patient is non-contributory due to uncooperation and alternating agitation and somnolence. HPI is written with review of previous medical records, ED records, discussed with patient's mother via telephone, who was able to provide limited insight into the patient's acute medical condition. Patient's mother states that patient's PO intake has decreased but he would not cooperate with her nor would he tell her what was bothering him. Patient reported to want to stay in bed for the last 2 days.     Of note, patient was recently [place] [facility] [place] treatment of DKA and UTI (largely pan-sensitive Proteus mirabilis). In ED, patient was extremely agitated, given multiple doses of Ativan due to agitation, aggressive/abusive statements towards nursing and medical staff. Tachycardic to 110s, hypertensive, febrile to 101.5. Given broad spectrum Abx (Vanc/Zosyn). PEC'ed by ED due to extreme agitation.    Overview/Hospital Course:  Patient admitted for altered mental status. Suspect it was secondary to UTI and COVID infection. UDS negative. Patient was PEC'd while in the ED for agitation. Patients mental status improving. Will consult psychiatry if patient needs psych placement. Finished remdesivir course, not hypoxic. Improving respiratory status. Minor episodes of cough.  Reviewed previous A1C from 2 months on 11.3. uptitrated insulin regimen. Psychiatry following, patient with intermittent explosive disorder, and recommend maintaining current regimen, as well as PEC as he is confused. Current presentation consistent with encephalopathy rather than psychosis or vero. Psych not recommending Psych placement. Once he is back to baseline, they will reassess need for psych placement. Titrating blood glucose. Continues on PO cefpodoxime. Neurology consulted for NPH, recommending outpatient neurosurgery evaluation. Episode of hypertension, controlled wit PRN labetalol. Started on PO amlodipine for BP control. Decreased risperidone dose as per psychiatry recs given patient's tone is increased on examination. Hypoglycemic overnight, started on D10 bolus, improved. Will reach out again to family for new collaterals on patient baseline status. Somnolent on examination. Optimizing blood glucose management, PRN zyprexa for agitation only. Increased tegretol dose to 200 AM, 400PM as per psychiatry recommendations. Continue holding risperidone. Patient somnolent. No new episodes of agitation for more than 48hs. Optimizing BG. EEG with isolated discharges R > L without seizure activity.     Interval History: NAEON. AFVSS. Patient alert and oriented this morning but minimally interactive with history cooperation. Denies any physical symptoms and reports normal appetite. Labs stable. BG better controlled this AM at 195, will continue to monitor throughout afternoon. Continuing CEC, expires tomorrow. Additionally, patient will likely discharge home with  and will require a medical bed for adequate care.    Domenic Mabry requires a hospital bed due to him requiring positioning of the body in ways not feasible with an ordinary bed to alleviate pain/ is completely immobile /or limited mobility and cannot independently make changes in body position without the use of the bed. The positioning of the  body cannot be sufficiently resolved by the use of pillows and wedges.      Review of Systems  Objective:     Vital Signs (Most Recent):  Temp: 96.3 °F (35.7 °C) (03/18/24 0739)  Pulse: 80 (03/18/24 0739)  Resp: 16 (03/18/24 0739)  BP: (!) 147/84 (03/18/24 0739)  SpO2: (!) 94 % (03/18/24 0739) Vital Signs (24h Range):  Temp:  [96.3 °F (35.7 °C)-98 °F (36.7 °C)] 96.3 °F (35.7 °C)  Pulse:  [70-92] 80  Resp:  [16-20] 16  SpO2:  [92 %-95 %] 94 %  BP: (102-161)/(69-85) 147/84     Weight: 63.5 kg (140 lb)  Body mass index is 18.99 kg/m².    Intake/Output Summary (Last 24 hours) at 3/18/2024 0850  Last data filed at 3/17/2024 2000  Gross per 24 hour   Intake 50 ml   Output 50 ml   Net 0 ml         Physical Exam  Vitals and nursing note reviewed.   Constitutional:       Comments: Disheveled    HENT:      Head: Normocephalic.   Eyes:      General: No scleral icterus.     Extraocular Movements: Extraocular movements intact.   Cardiovascular:      Rate and Rhythm: Normal rate and regular rhythm.   Pulmonary:      Effort: No respiratory distress.      Breath sounds: Normal breath sounds. No wheezing or rales.   Abdominal:      General: There is no distension.      Palpations: Abdomen is soft.   Musculoskeletal:      Cervical back: Normal range of motion.   Skin:     General: Skin is warm and dry.      Findings: No rash.   Neurological:      Mental Status: He is alert.   Psychiatric:         Mood and Affect: Affect is labile.         Speech: Speech is delayed.         Behavior: Behavior is slowed.         Cognition and Memory: Cognition is impaired.      Comments: Oriented to person and place.             Significant Labs: All pertinent labs within the past 24 hours have been reviewed.    Significant Imaging: I have reviewed all pertinent imaging results/findings within the past 24 hours.    Assessment/Plan:      * Acute encephalopathy  Likely 2/2 to drug use, but underlying COVID+ infection and possible UTI may be contributing  "factors. Utox negative, but reported history of recent methamphetamine use.     -- Patient improving on mental status   -- PEC'ed patient prior to admission to . (Expires 03/19)  -- Capacity evaluation QD  -- PRN Zyprexa for non-redirectable agitation / violence  -- Treat underlying infection  -- See Bipolar Disorder A/P for home medications  - Negative encephalopathy workup  - High risk of seizures, given prior history of stroke. "This is an abnormal continuous EEG monitoring study because of a moderate encephalopathy with evidence of cortical irritation and a potential seizure foci bilaterally, right>left.  There are no pushbutton activations and no electrographic seizures."   - Not on anti-epileptics as IP or at home  - Psychiatry following, at the moment not a candidate for IP psych placement given persistent delirium     Metabolic encephalopathy  EEG ruling out seizure activity. Because of persistent discharges on EEG and slowing background, findings are consistent with metabolic source of encephalopathy, in addition to patient's chronic use of methamphetamines.       Constipation  CTAP wc s/o moderate stool burden, diverticula w/o infection. Negative KUB.    - optimizing bowel regimen.       COVID  Resolved.     Acute cystitis without hematuria  -Resolved.     Bipolar disorder  -- Continue home medication regimen:  -venlafaxine  - carbamazepine 200 AM, 400 PM   - hold risperidone      Intermittent explosive disorder  Psychiatry following, patient with intermittent explosive disorder, and recommend maintaining current regimen, as well as PEC as he is confused. Current presentation consistent with encephalopathy rather than psychosis or vero. Psych not recommending Psych placement. Once he is back to baseline, they will reassess need for psych placement.       Type 1 diabetes mellitus with hyperglycemia  Patient's FSGs are uncontrolled due to hyperglycemia on current medication regimen.  Last A1c reviewed- "   Lab Results   Component Value Date    HGBA1C 10.1 (H) 03/07/2024     Most recent fingerstick glucose reviewed-   Recent Labs   Lab 03/17/24  1152 03/17/24  1710 03/17/24  1949 03/18/24  0740   POCTGLUCOSE 332* 271* 195* 128*       Current correctional scale  Low    Antihyperglycemics (From admission, onward)      Start     Stop Route Frequency Ordered    03/17/24 2100  insulin detemir U-100 (Levemir) pen 10 Units         -- SubQ 2 times daily 03/17/24 1003    03/16/24 1645  insulin aspart U-100 pen 5 Units         -- SubQ 3 times daily with meals 03/16/24 1302    03/07/24 1649  insulin aspart U-100 pen 0-5 Units         -- SubQ Before meals & nightly PRN 03/07/24 1550            -- Hold oral hyperglycemics  -- SSI, POCT BG qACHS; titrate basal/bolus regimen PRN to 140-180 goal  -- Diabetic diet  - Detemir 10U BID / Aspart 3U TIDWM + LDSSI, hold ASPART if patient not eating his food (patiens it pocketing his food). SLP consulted.  - Current A1C 10.1. Reviewed previous A1C on 11.3  - patient non-compliant with insulin regimen at home     CAD (coronary artery disease)  Hyperlipidemia    Known CAD. Patient not exhibiting ACS symptoms at this time.     Last known [facility] done 09/2023:  1.  60% in stent restenosis of previously placed right coronary artery stent.  2.  Successful balloon angioplasty with a 3 0 x 20 fall by 3.5 x 20 noncompliant balloon.  60% stenosis reduced to 0%.  YENI 3 flow.  No dissection.  3.  50% stenosis noted in the mid and distal left anterior descending artery.  There is a 70 % stenosis at the apex, small caliber.    -- Continue HI Statin  -- Continue ASA 81  -- Hold Plavix as patient's > 1 year from last PCI    COPD (chronic obstructive pulmonary disease)  Patient's COPD is controlled currently.  Patient is currently off COPD Pathway.     -PRN inhalers    Sepsis  Currently not having sepsis criteria. Positive nitrates on admission. Finished AB management.     Polysubstance use disorder  History  of recent methamphetamine, tobacco, alcohol use. Likely contributor to psychiatric disorder and agitation      Hemiparesis affecting right side as late effect of stroke  Noted per history    Anemia of chronic disease  Patient's anemia is currently controlled. Has not received any PRBCs to date. Etiology likely d/t chronic disease due to recurrent infections, inflammation  Current CBC reviewed-   Lab Results   Component Value Date    HGB 14.1 03/18/2024    HCT 46.0 03/18/2024     Monitor serial CBC and transfuse if patient becomes hemodynamically unstable, symptomatic or H/H drops below 7/21.    Primary hypertension  Chronic, controlled. Latest blood pressure and vitals reviewed-     Temp:  [96.3 °F (35.7 °C)-98 °F (36.7 °C)]   Pulse:  [70-92]   Resp:  [16-20]   BP: (102-161)/(69-85)   SpO2:  [92 %-95 %] .   Home meds for hypertension were reviewed and noted below.   Hypertension Medications               losartan (COZAAR) 50 MG tablet Take 1 tablet (50 mg total) by mouth once daily.    metoprolol succinate (TOPROL-XL) 25 MG 24 hr tablet Take 1 tablet (25 mg total) by mouth once daily.          - on losartan 50 and nifedipine 60 as IP   - PRN labetalol       VTE Risk Mitigation (From admission, onward)           Ordered     enoxaparin injection 40 mg  Daily         03/04/24 1904     IP VTE HIGH RISK PATIENT  Once         03/04/24 1904     Place sequential compression device  Until discontinued         03/04/24 1904                    Discharge Planning   ROLA: 3/19/2024     Code Status: Full Code   Is the patient medically ready for discharge?: No    Reason for patient still in hospital (select all that apply): Patient trending condition, Consult recommendations, and Pending disposition  Discharge Plan A: Psychiatric hospital   Discharge Delays: None known at this time              Pieter Maya DO  Department of Hospital Medicine   [facility]

## 2024-03-18 NOTE — PLAN OF CARE
Problem: Infection  Goal: Absence of Infection Signs and Symptoms  Outcome: Ongoing, Progressing     Problem: Adult Inpatient Plan of Care  Goal: Plan of Care Review  Outcome: Ongoing, Progressing  Goal: Patient-Specific Goal (Individualized)  Outcome: Ongoing, Progressing  Goal: Absence of Hospital-Acquired Illness or Injury  Outcome: Ongoing, Progressing  Goal: Optimal Comfort and Wellbeing  Outcome: Ongoing, Progressing  Goal: Readiness for Transition of Care  Outcome: Ongoing, Progressing     Problem: Violence Risk or Actual  Goal: Anger and Impulse Control  Outcome: Ongoing, Progressing     Problem: Diabetes Comorbidity  Goal: Blood Glucose Level Within Targeted Range  Outcome: Ongoing, Progressing     Problem: Impaired Wound Healing  Goal: Optimal Wound Healing  Outcome: Ongoing, Progressing     Problem: Skin Injury Risk Increased  Goal: Skin Health and Integrity  Outcome: Ongoing, Progressing     Problem: Adjustment to Illness (Sepsis/Septic Shock)  Goal: Optimal Coping  Outcome: Ongoing, Progressing     Problem: Bleeding (Sepsis/Septic Shock)  Goal: Absence of Bleeding  Outcome: Ongoing, Progressing     Problem: Glycemic Control Impaired (Sepsis/Septic Shock)  Goal: Blood Glucose Level Within Desired Range  Outcome: Ongoing, Progressing     Problem: Infection Progression (Sepsis/Septic Shock)  Goal: Absence of Infection Signs and Symptoms  Outcome: Ongoing, Progressing     Problem: Nutrition Impaired (Sepsis/Septic Shock)  Goal: Optimal Nutrition Intake  Outcome: Ongoing, Progressing     Problem: Fall Injury Risk  Goal: Absence of Fall and Fall-Related Injury  Outcome: Ongoing, Progressing

## 2024-03-18 NOTE — ASSESSMENT & PLAN NOTE
Patient's anemia is currently controlled. Has not received any PRBCs to date. Etiology likely d/t chronic disease due to recurrent infections, inflammation  Current CBC reviewed-   Lab Results   Component Value Date    HGB 14.1 03/18/2024    HCT 46.0 03/18/2024     Monitor serial CBC and transfuse if patient becomes hemodynamically unstable, symptomatic or H/H drops below 7/21.

## 2024-03-18 NOTE — PT/OT/SLP PROGRESS
"Occupational Therapy   Co-Treatment  Co-treatment performed due to patient's multiple deficits requiring two skilled therapists to appropriately and safely assess patient's strength and endurance while facilitating functional tasks in addition to accommodating for patient's activity tolerance.        Name: Cali Mabry  MRN: [id number]  Admitting Diagnosis:  Acute encephalopathy       Recommendations:     Discharge Recommendations: Moderate Intensity Therapy  Discharge Equipment Recommendations:  hospital bed  Barriers to discharge:  Other (Comment) (Increased skilled assistance required for ADLs & functional mobility/transfers)    Assessment:     Cali Mabry is a 58 y.o. male with a medical diagnosis of Acute encephalopathy.  He presents with the following performance deficits affecting function are weakness, impaired endurance, impaired functional mobility, impaired self care skills, impaired balance, impaired cognition, visual deficits, decreased upper extremity function, decreased lower extremity function, decreased safety awareness, impaired fine motor, impaired coordination. Pt agreeable to therapy but did not tolerate well. Pt appeared to be more alert & verbal. However, pt remains confused and demonstrates difficulty following verbal commands. Ten minutes into the treatment session pt demonstrated increased agitation followed by increased verbal aggression. Pt then asked politely if he would like to continue therapy and pt responded "no."  Pt remains significantly limited in ADLs, functional mobility, and functional transfers and is currently not performing tasks at [facility]. Pt would continue to benefit from skilled OT services to maximize functional independence with ADLs and functional mobility, reduce caregiver burden, and facilitate safe discharge in the least restrictive environment.     Rehab Prognosis:  Fair; patient would benefit from acute skilled OT services to address these deficits and " "reach maximum level of function.       Plan:     Patient to be seen 3 x/week to address the above listed problems via self-care/home management, therapeutic activities, therapeutic exercises  Plan of Care Expires: 04/14/24  Plan of Care Reviewed with: patient    Subjective     Chief Complaint: pt yelled "don't tell me what I can't do"  Patient/Family Comments/goals: none  Pain/Comfort:  Pain Rating 1: 0/10  Location - Side 1: Left  Location - Orientation 1: generalized  Location 1: arm  Pain Addressed 1: Reposition, Distraction  Pain Rating Post-Intervention 1: 0/10    Objective:     Communicated with: RN prior to session.  Patient found HOB elevated with upon OT entry to room.    General Precautions: Standard, aspiration, fall    Orthopedic Precautions:N/A  Braces: N/A  Respiratory Status: Room air     Occupational Performance:     Bed Mobility:    Patient completed Scooting to HOB with total assistance and 2 persons  Patient completed Supine to Sit with total assistance and 2 persons  Patient completed Sit to Supine with total assistance and 2 persons     Therapeutic exercises: pt completed the following exercises to maintain/improve UE ROM/strength, prevent joint stiffness, & core strength which are all required for independence/participation in ADLs/IADLs, & functional mobility/transfers  - AROM Shoulder flexion x10 (each UE)  - Reaching across midline x3 reps for L & R UE    Functional Mobility/Transfers:  Therapy session cut short due to pt becoming verbally aggressive and then refused therapy shortly after    Activities of Daily Living:  Therapy session cut short due to pt becoming verbally aggressive and then refused therapy shortly after      AMPAC 6 Click ADL: 6    Treatment & Education:  -Education on importance of OOB activity to improve overall activity tolerance and promote recovery  -Education on hand placement to maintain balance while seated EOB  Pt had no further questions & when asked whether " there were any concerns pt reported none.   HOB elevated  Patient left  with all lines intact, call button in reach, RN notified, and sitter present    GOALS:   Multidisciplinary Problems       Occupational Therapy Goals          Problem: Occupational Therapy    Goal Priority Disciplines Outcome Interventions   Occupational Therapy Goal     OT, PT/OT Ongoing, Progressing    Description: Goals to be met by: 04/14/2024     Patient will increase functional independence with ADLs by performing:    UE Dressing with Moderate Assistance.  LE Dressing with Moderate Assistance.  Grooming while EOB with Moderate Assistance.  Toileting from bedside commode with Maximum Assistance for hygiene and clothing management.   Rolling to Right, Left with Minimal Assistance.   Supine to sit with Minimal Assistance.  Squat pivot transfers with Moderate Assistance.                         Time Tracking:     OT Date of Treatment: 03/18/24  OT Start Time: 1200  OT Stop Time: 1211  OT Total Time (min): 11 min    Billable Minutes:Therapeutic Exercise 11    OT/LEONCIO: OT          3/18/2024

## 2024-03-18 NOTE — ASSESSMENT & PLAN NOTE
"Likely 2/2 to drug use, but underlying COVID+ infection and possible UTI may be contributing factors. Utox negative, but reported history of recent methamphetamine use.     -- Patient improving on mental status   -- PEC'ed patient prior to admission to . (Expires 03/19)  -- Capacity evaluation QD  -- PRN Zyprexa for non-redirectable agitation / violence  -- Treat underlying infection  -- See Bipolar Disorder A/P for home medications  - Negative encephalopathy workup  - High risk of seizures, given prior history of stroke. "This is an abnormal continuous EEG monitoring study because of a moderate encephalopathy with evidence of cortical irritation and a potential seizure foci bilaterally, right>left.  There are no pushbutton activations and no electrographic seizures."   - Not on anti-epileptics as IP or at home  - Psychiatry following, at the moment not a candidate for IP psych placement given persistent delirium   "

## 2024-03-18 NOTE — PLAN OF CARE
Attempted to reach mother and niece listed in chart as collateral/emergency contacts to determine patient's baseline mental orientation.    No answer at either number.    Jamarcus Browning MD  [facility] PGYII

## 2024-03-18 NOTE — NURSING
Per psychiatry note at 1003 a.m.     RISK ASSESSMENT  Continue CEC because patient is in imminent danger of hurting self or others and is gravely disabled. & NEEDS 1:1 sitter.  CEC exp 3/19/24 @ 1613h, will discuss with primary team whether WALLACE needs to be filed or there is no further need for the patient to be under a legal status.  Charge nurse reached out to psychiatry to clarify rather CEC was rescinded or not. Per psychiatry, CEC was rescinded despite note placed earlier today stating CEC was to be continued until expiration on 03/19/24 at 1613.

## 2024-03-19 VITALS
BODY MASS INDEX: 18.96 KG/M2 | WEIGHT: 140 LBS | SYSTOLIC BLOOD PRESSURE: 175 MMHG | HEART RATE: 114 BPM | OXYGEN SATURATION: 95 % | HEIGHT: 72 IN | RESPIRATION RATE: 18 BRPM | TEMPERATURE: 98 F | DIASTOLIC BLOOD PRESSURE: 105 MMHG

## 2024-03-19 LAB
ALBUMIN SERPL BCP-MCNC: 3.2 G/DL (ref 3.5–5.2)
ALP SERPL-CCNC: 112 U/L (ref 55–135)
ALT SERPL W/O P-5'-P-CCNC: 31 U/L (ref 10–44)
ANION GAP SERPL CALC-SCNC: 7 MMOL/L (ref 8–16)
AST SERPL-CCNC: 24 U/L (ref 10–40)
BILIRUB SERPL-MCNC: 0.3 MG/DL (ref 0.1–1)
BUN SERPL-MCNC: 17 MG/DL (ref 6–20)
CALCIUM SERPL-MCNC: 9 MG/DL (ref 8.7–10.5)
CHLORIDE SERPL-SCNC: 101 MMOL/L (ref 95–110)
CO2 SERPL-SCNC: 25 MMOL/L (ref 23–29)
CREAT SERPL-MCNC: 1 MG/DL (ref 0.5–1.4)
EST. GFR  (NO RACE VARIABLE): >60 ML/MIN/1.73 M^2
GLUCOSE SERPL-MCNC: 279 MG/DL (ref 70–110)
POCT GLUCOSE: 223 MG/DL (ref 70–110)
POCT GLUCOSE: 265 MG/DL (ref 70–110)
POCT GLUCOSE: 320 MG/DL (ref 70–110)
POTASSIUM SERPL-SCNC: 4.6 MMOL/L (ref 3.5–5.1)
PROT SERPL-MCNC: 7.6 G/DL (ref 6–8.4)
SODIUM SERPL-SCNC: 133 MMOL/L (ref 136–145)

## 2024-03-19 PROCEDURE — 97535 SELF CARE MNGMENT TRAINING: CPT

## 2024-03-19 PROCEDURE — 97530 THERAPEUTIC ACTIVITIES: CPT

## 2024-03-19 PROCEDURE — 25000003 PHARM REV CODE 250: Performed by: STUDENT IN AN ORGANIZED HEALTH CARE EDUCATION/TRAINING PROGRAM

## 2024-03-19 PROCEDURE — 25000003 PHARM REV CODE 250: Performed by: HOSPITALIST

## 2024-03-19 PROCEDURE — 80053 COMPREHEN METABOLIC PANEL: CPT

## 2024-03-19 PROCEDURE — 36415 COLL VENOUS BLD VENIPUNCTURE: CPT

## 2024-03-19 PROCEDURE — 25000003 PHARM REV CODE 250

## 2024-03-19 RX ADMIN — INSULIN ASPART 3 UNITS: 100 INJECTION, SOLUTION INTRAVENOUS; SUBCUTANEOUS at 09:03

## 2024-03-19 RX ADMIN — INSULIN ASPART 4 UNITS: 100 INJECTION, SOLUTION INTRAVENOUS; SUBCUTANEOUS at 12:03

## 2024-03-19 RX ADMIN — LOSARTAN POTASSIUM 100 MG: 50 TABLET, FILM COATED ORAL at 09:03

## 2024-03-19 RX ADMIN — ASPIRIN 81 MG CHEWABLE TABLET 81 MG: 81 TABLET CHEWABLE at 09:03

## 2024-03-19 RX ADMIN — POLYETHYLENE GLYCOL 3350 17 G: 17 POWDER, FOR SOLUTION ORAL at 09:03

## 2024-03-19 RX ADMIN — CARBAMAZEPINE 200 MG: 200 TABLET ORAL at 09:03

## 2024-03-19 RX ADMIN — INSULIN ASPART 4 UNITS: 100 INJECTION, SOLUTION INTRAVENOUS; SUBCUTANEOUS at 04:03

## 2024-03-19 RX ADMIN — INSULIN DETEMIR 10 UNITS: 100 INJECTION, SOLUTION SUBCUTANEOUS at 09:03

## 2024-03-19 RX ADMIN — ATORVASTATIN CALCIUM 20 MG: 20 TABLET, FILM COATED ORAL at 09:03

## 2024-03-19 RX ADMIN — INSULIN ASPART 5 UNITS: 100 INJECTION, SOLUTION INTRAVENOUS; SUBCUTANEOUS at 12:03

## 2024-03-19 RX ADMIN — NIFEDIPINE 60 MG: 60 TABLET, FILM COATED, EXTENDED RELEASE ORAL at 09:03

## 2024-03-19 RX ADMIN — Medication 100 MG: at 09:03

## 2024-03-19 RX ADMIN — INSULIN ASPART 5 UNITS: 100 INJECTION, SOLUTION INTRAVENOUS; SUBCUTANEOUS at 04:03

## 2024-03-19 RX ADMIN — SENNOSIDES AND DOCUSATE SODIUM 2 TABLET: 8.6; 5 TABLET ORAL at 09:03

## 2024-03-19 RX ADMIN — FOLIC ACID 1 MG: 1 TABLET ORAL at 09:03

## 2024-03-19 RX ADMIN — INSULIN ASPART 5 UNITS: 100 INJECTION, SOLUTION INTRAVENOUS; SUBCUTANEOUS at 09:03

## 2024-03-19 RX ADMIN — VENLAFAXINE HYDROCHLORIDE 75 MG: 75 CAPSULE, EXTENDED RELEASE ORAL at 09:03

## 2024-03-19 RX ADMIN — THERA TABS 1 TABLET: TAB at 09:03

## 2024-03-19 RX ADMIN — BENZONATATE 100 MG: 100 CAPSULE ORAL at 09:03

## 2024-03-19 NOTE — PROGRESS NOTES
"CONSULTATION LIAISON PSYCHIATRY PROGRESS NOTE    Patient Name: Cali Mabry  MRN: [id number]  Patient Class: IP- Inpatient  Admission Date: 3/4/2024  Attending Physician: Doroteo Vyas MD    HPI:  58M with past psychiatric history of intermittent explosive disorder 2/2 TBI v CVA, Bipolar Disorder, Major Neurocognitive Disorder, MDD, IVDU, and stimulant use disorder & past pertinent medical history of acute on chronic pancreatitis, CHF, T1DM, and CVA presents to the ED reportedly via EMS for AMS. Admitted for Acute Encephalopathy management.     Consulted for "patient with history of Bipolar and substance use disorder, currently PEC'd. Consult for psych evaluation and need for placement in psych facility?" PEC placed in ED due to combative behavior and confusion. Patient was found to be COVID+. UDS neg    SUBJECTIVE:     It is noted that patient  showed poor frustration tolerance during PT and was verbally aggressive and non-cooperative with OT yesterday. Patient had no acute events overnight. He refused his pm medications. He did not require any prn medications for agitation or aggression. He was compliant with scheduled medications this am.  His vital signs were notable for hypertension.     Patient was accepted into a nursing facility yesterday with notes stating planning for discharge today.     Today, patient is greeted at the bedside. The patient is laying in bed with head of bed elevated with his eyes open.  He responds to prompting by the interviewer to engage in the psychiatric assessment. He is calm and is cooperative. When asked how he is doing today he states "fine". He states he is sleeping "okay" but is still not eating well due to stomach pain. When observing the sunshine coming through his window, he states "I want to go outside". He denies having feelings of depression, anxiety, suicidal ideation, homicidal ideation, auditory or visual hallucinations. When asking questions regarding " "orientation, he is oriented to person and place, but not to date.     OBJECTIVE:    Mental Status Exam:  General Appearance: dressed in hospital garb, lying in bed, thin and gaunt  Behavior: under good behavioral control, minimal responses, poor eye contact  Involuntary Movements and Motor Activity: no abnormal involuntary movements noted  Gait and Station: unable to assess - patient lying down or seated  Speech and Language: decreased spontaneity, responds to questions minimally/briefly  Mood: "fine"  Affect: blunted  Thought Process and Associations: linear, logical  Thought Content and Perceptions:: no suicidal or homicidal ideation, no auditory or visual hallucinations, no paranoid ideation, no ideas of reference, no evidence of delusions or psychosis, Denied SI; does not appear to be +RIS  Sensorium and Orientation: oriented to person and place  Recent and Remote Memory: Unable to  Formally Assess  Attention and Concentration: Unable to Formally Assess  Fund of Knowledge: Unable to Formally Assess  Insight: limited/partial awareness of illness  Judgment: limited    CAM ICU positive? Unable to assess    AIMS: Score 0/36  Abnormal Involuntary Movement Scale  0-4   Muscles of Facial Expression  0   Lips and Perioral Area  0   Jaw  0   Tongue  0   Upper (arms, wrists, hands, fingers)  0    Lower (legs, knees, ankles, toes)  0   Neck, shoulders, hips  0   Severity of abnormal movements (highest score from questions above)  0    Incapacitation due to abnormal movements  0    Patient's awareness of abnormal movements (rate only patient's report)  0   Current problems with teeth and/or dentures?  No    Does patient usually wear dentures?  No       ASSESSMENT & RECOMMENDATIONS   Unspecified Neurocognitive Disorder              R/O Normal Pressure Hydrocephalus  Amphetamine Use Disorder, Severe  Intermittent Explosive Disorder  History of Bipolar Disorder I  History of Traumatic Brain Injury  History of Cerebrovascular " Accident    Agitation improved. Patient has been irritable but has not needed PRN medications for non-redirectable agitation or aggression for over 1 week. Still only oriented to person.   Continue to HOLD both Risperdal 1 mg PO BID & Zyprexa (Zydis) 5 mg PO nightly at this time due to concerns of EPS & excessive sedation.  Continue Tegretol home dose 200 mg daily and 400 mg nightly  Continue Effexor XR 75 mg PO daily  PRN Zyprexa 5 mg PO/IM TID for non-redirectable agitation associated with breakthrough psychosis or vero    DELIRIUM BEHAVIOR MANAGEMENT  PLEASE utilize CHEMICAL restraints with PRN meds first for agitation. Minimize use of PHYSICAL restraints OR have periods of being out of physical restraints if possible.  Keep window shades open and room lit during day and room dim at night in order to promote normal sleep-wake cycles  Encourage family at bedside. Cornelius patient often to situation, location, date.  Continue to Limit or Discontinue use of Narcotics, Benzos and Anti-cholinergic medications as they may worsen delirium.  Continue medical workup for causative etiology of Delirium.        RISK ASSESSMENT  No [place] at this time [place] [place] @ 1613[place]  NEEDS 1:1 sitter.    Will follow up while in house      DISPOSITION - once medically cleared:   DISPOSITION - once medically cleared:   Will reassess daily, warrants continued medical work-up at this time    Please contact ON CALL psychiatry service (24/7) for any acute issues that may arise.    Dr. Jamarcus Browning MD  [facility] PGY-II    Psychiatry  [facility]  3/19/2024 0940     --------------------------------------------------------------------------------------------------------------------------------------------------------------------------------------------------------------------------------------    CONTINUED OBJECTIVE clinical data & findings reviewed and noted for above decision making    Current  Medications:   Scheduled Meds:    aspirin  81 mg Oral Daily    atorvastatin  20 mg Oral Daily    carBAMazepine  200 mg Oral Daily    carBAMazepine  400 mg Oral QHS    enoxparin  40 mg Subcutaneous Daily    folic acid  1 mg Oral Daily    insulin aspart U-100  5 Units Subcutaneous TIDWM    insulin detemir U-100  10 Units Subcutaneous BID    losartan  100 mg Oral Daily    melatonin  6 mg Oral Nightly    multivitamin  1 tablet Oral Daily    NIFEdipine  60 mg Oral Daily    polyethylene glycol  17 g Oral Q6H WAKE    senna-docusate 8.6-50 mg  2 tablet Oral BID    thiamine  100 mg Oral Daily    venlafaxine  75 mg Oral Daily     PRN Meds: albuterol, benzonatate, dextromethorphan-guaiFENesin  mg/5 ml, dextrose 10%, dextrose 10%, glucagon (human recombinant), glucose, glucose, insulin aspart U-100, labetalol, naloxone, OLANZapine, ondansetron, sodium chloride 0.9%    Allergies:   Review of patient's allergies indicates:  No Known Allergies    Vitals  Vitals:    03/19/24 0814   BP: (!) 163/83   Pulse: 93   Resp: 18   Temp: 97.7 °F (36.5 °C)       Labs/Imaging/Studies:  Recent Results (from the past 24 hour(s))   POCT glucose    Collection Time: 03/18/24 11:08 AM   Result Value Ref Range    POCT Glucose 145 (H) 70 - 110 mg/dL   POCT glucose    Collection Time: 03/18/24  4:53 PM   Result Value Ref Range    POCT Glucose 73 70 - 110 mg/dL   POCT glucose    Collection Time: 03/19/24  8:16 AM   Result Value Ref Range    POCT Glucose 265 (H) 70 - 110 mg/dL   Comprehensive metabolic panel    Collection Time: 03/19/24  8:39 AM   Result Value Ref Range    Sodium 133 (L) 136 - 145 mmol/L    Potassium 4.6 3.5 - 5.1 mmol/L    Chloride 101 95 - 110 mmol/L    CO2 25 23 - 29 mmol/L    Glucose 279 (H) 70 - 110 mg/dL    BUN 17 6 - 20 mg/dL    Creatinine 1.0 0.5 - 1.4 mg/dL    Calcium 9.0 8.7 - 10.5 mg/dL    Total Protein 7.6 6.0 - 8.4 g/dL    Albumin 3.2 (L) 3.5 - 5.2 g/dL    Total Bilirubin 0.3 0.1 - 1.0 mg/dL    Alkaline Phosphatase 112  55 - 135 U/L    AST 24 10 - 40 U/L    ALT 31 10 - 44 U/L    eGFR >60.0 >60 mL/min/1.73 m^2    Anion Gap 7 (L) 8 - 16 mmol/L     Imaging Results              CT Head Without Contrast (Final result)  Result time 03/04/24 16:55:45      Final result by Jered Cheatham MD (03/04/24 16:55:45)                   Impression:      No evidence of acute hemorrhage or major vascular distribution infarct.    Chronic small vessel ischemic change with remote right occipital and left cerebellar infarcts.    Ventriculomegaly out of proportion to the degree of sulcal enlargement, similar to prior exams.  While this could relate to cerebral volume loss, the configuration does raise the possibility of normal pressure hydrocephalus. Clinical correlation required.      Electronically signed by: Jered Cheatham MD  Date:    03/04/2024  Time:    16:55               Narrative:    EXAMINATION:  CT HEAD WITHOUT CONTRAST    CLINICAL HISTORY:  Mental status change, unknown cause;    TECHNIQUE:  Low dose axial CT images obtained throughout the head without the use of intravenous contrast.  Axial, sagittal and coronal reconstructions were performed.    COMPARISON:  12/10/2023.    FINDINGS:  Intracranial compartment:    Ventricles are enlarged, similar to prior.  Third ventricle diameter again measuring up to 1.4 cm.  Ventricular enlargement out of proportion of degree of sulcal enlargement.    Chronic small vessel ischemic change in the supratentorial white matter.  Remote right occipital infarct.  Remote left cerebellar infarct.  No acute major vascular distribution infarct.  No acute parenchymal hemorrhage.  No new intracranial mass effect or midline shift.    No extra-axial blood or fluid collections.    Skull/extracranial contents (limited evaluation):    No displaced calvarial fracture.    Mastoid air cells are clear. Mild patchy mucosal thickening in the visualized paranasal sinuses.                                       CT Abdomen Pelvis  With IV Contrast NO Oral Contrast (Final result)  Result time 03/04/24 18:05:52      Final result by New Falcon MD (03/04/24 18:05:52)                   Impression:      Moderate colonic stool burden with mild wall thickening of the rectum, correlate with signs of constipation or proctitis.    Diffuse bladder wall thickening, correlate with urinalysis and symptoms of cystitis.    Diverticulosis coli without evidence of acute diverticulitis    Stable 5 mm right middle lobe solid pulmonary micronodule.    Additional findings as above.    Electronically signed by resident: Marsha Cardona  Date:    03/04/2024  Time:    17:27    Electronically signed by: New Falcon MD  Date:    03/04/2024  Time:    18:05               Narrative:    EXAMINATION:  CT ABDOMEN PELVIS WITH IV CONTRAST    CLINICAL HISTORY:  Abdominal abscess/infection suspected;    TECHNIQUE:  Axial images of the abdomen and pelvis were acquired after administration of 75 cc Omnipaque 350 IV contrast. No oral contrast was administered.  Coronal and sagittal reconstructions were also obtained.    COMPARISON:  CT 12/12/2023 and 07/26/2023.    FINDINGS:  Examination degraded by patient motion artifact.    Heart: Normal heart size.  No pericardial fluid.  No significant calcific coronary atherosclerosis.    Lungs: Evaluation of the lung bases limited due to respiratory motion.  Allowing for this, stable linear bandlike opacities of the right lung base suggestive of atelectasis/scarring.  Stable 5 mm right middle lobe solid pulmonary micronodule (2-7).  No new focal consolidation.  No pleural effusion or pneumothorax.    Liver: Normal in size and contour.  No focal hepatic lesion.    Gallbladder: No calcified gallstones.    Bile Ducts: No biliary ductal dilatation.    Pancreas: No mass or peripancreatic fat stranding.    Spleen: Spleen is upper limits of normal in size measuring 12 cm.    Adrenals: No significant abnormalities.    Kidneys/Ureters: Normal in  size and location. Normal enhancement. No hydronephrosis or nephrolithiasis. No ureteral dilatation.    Bladder: Diffuse bladder wall thickening.    Reproductive organs: Prostate is unremarkable.    Peritoneum: No free air or free fluid.    Retroperitoneum: No pathologically enlarged retroperitoneal lymph nodes.    Bowel/Mesentery: Small hiatal hernia.  Stomach is otherwise unremarkable.  Small bowel is normal in caliber without evidence of inflammation or obstruction.  Moderate colonic stool burden.  Mild wall thickening of the rectum with large fecal burden, component of proctitis to be considered.  Scattered colonic diverticuli.  Appendix not definitively visualized.  No steve-cecal soft tissue stranding.    Abdominal wall:  Small fat containing umbilical hernia. 4.7 x 1.8 cm fat containing lesion along the left anterolateral chest wall, favored to represent a lipoma.    Vasculature: Mild calcific atherosclerosis of the abdominal aorta and its branches.  No aneurysm.    Bones: Mild degenerative changes spine.  Prominent Schmorl's node at L1, unchanged.  Healing left anterolateral rib fractures.  No acute fractures.  Lucent lesion within the right femoral neck, stable dating back to multiple priors.                                       X-Ray Chest AP Portable (Final result)  Result time 03/04/24 13:37:35      Final result by Cullen Bush MD (03/04/24 13:37:35)                   Impression:      See above      Electronically signed by: Cullen Bush MD  Date:    03/04/2024  Time:    13:37               Narrative:    EXAMINATION:  XR CHEST AP PORTABLE    CLINICAL HISTORY:  Sepsis;    TECHNIQUE:  Single frontal view of the chest was performed.    COMPARISON:  None 12/22/2023    FINDINGS:  Heart size normal.  The lungs are clear.  No pleural effusion

## 2024-03-19 NOTE — PLAN OF CARE
Problem: Infection  Goal: Absence of Infection Signs and Symptoms  Outcome: Met     Problem: Adult Inpatient Plan of Care  Goal: Plan of Care Review  Outcome: Met  Goal: Patient-Specific Goal (Individualized)  Outcome: Met  Goal: Absence of Hospital-Acquired Illness or Injury  Outcome: Met  Goal: Optimal Comfort and Wellbeing  Outcome: Met  Goal: Readiness for Transition of Care  Outcome: Met     Problem: Violence Risk or Actual  Goal: Anger and Impulse Control  Outcome: Met     Problem: Diabetes Comorbidity  Goal: Blood Glucose Level Within Targeted Range  Outcome: Met     Problem: Impaired Wound Healing  Goal: Optimal Wound Healing  Outcome: Met     Problem: Skin Injury Risk Increased  Goal: Skin Health and Integrity  Outcome: Met     Problem: Adjustment to Illness (Sepsis/Septic Shock)  Goal: Optimal Coping  Outcome: Met     Problem: Bleeding (Sepsis/Septic Shock)  Goal: Absence of Bleeding  Outcome: Met     Problem: Glycemic Control Impaired (Sepsis/Septic Shock)  Goal: Blood Glucose Level Within Desired Range  Outcome: Met     Problem: Infection Progression (Sepsis/Septic Shock)  Goal: Absence of Infection Signs and Symptoms  Outcome: Met     Problem: Nutrition Impaired (Sepsis/Septic Shock)  Goal: Optimal Nutrition Intake  Outcome: Met     Problem: Fall Injury Risk  Goal: Absence of Fall and Fall-Related Injury  Outcome: Met     Problem: Physical Therapy  Goal: Physical Therapy Goal  Description: Goals to be met by: 2024     Patient will increase functional independence with mobility by performin. Supine to sit with Moderate Assistance  2. Sit to supine with Moderate Assistance  3. Sit to stand transfer with Moderate Assistance  4. Bed to chair transfer with Moderate Assistance using Rolling Walker  5. Gait  x 10 feet with Moderate Assistance using Rolling Walker.   6. Sitting at edge of bed x10 minutes with Minimal Assistance  7. Lower extremity exercise program x30 reps per handout, with  assistance as needed  8. Propel wheelchair 40 feet using Bilateral upper extremities and minimal assistance    Outcome: Met     Problem: SLP  Goal: SLP Goal  Description: Speech Language Pathology Goals  Goals expected to be met by 3/27    1. The patient will tolerate a least restrictive diet without overt signs of aspiration.          Outcome: Met     Problem: Occupational Therapy  Goal: Occupational Therapy Goal  Description: Goals to be met by: 04/14/2024     Patient will increase functional independence with ADLs by performing:    UE Dressing with Moderate Assistance.  LE Dressing with Moderate Assistance.  Grooming while EOB with Moderate Assistance.  Toileting from bedside commode with Maximum Assistance for hygiene and clothing management.   Rolling to Right, Left with Minimal Assistance.   Supine to sit with Minimal Assistance.  Squat pivot transfers with Moderate Assistance.    Outcome: Met

## 2024-03-19 NOTE — PLAN OF CARE
MD was unable to reach collateral (mother or sister) for baseline mental status.     At this time we will not recommend to continue CEC.     It should be noted the patient does not have capacity to sign out AMA.     Recommend patient disposition be skilled nursing or nursing home due medical issues, disorientation, and lack of insight and judgement into his condition. Patient is not appropriate for an inpatient behavioral health facility at this time.     Patient has not needed PRN medications for agitation or aggression in over 1 week.     Recommend to restart home Risperdal.     Information relayed to primary team via secure chat.    Jmaarcus Browning MD  [facility] Psychiatry PGYII

## 2024-03-19 NOTE — PT/OT/SLP PROGRESS
Physical Therapy Co-Treatment  Co-treatment with OT for maximal pt participation, safety, and activity tolerance    Patient Name:  Cali Mabry   MRN:  [id number]  Admitting Diagnosis:  Acute encephalopathy   Recent Surgery: * No surgery found *    Admit Date: 3/4/2024  Length of Stay: 15 days    Recommendations:     Discharge Recommendations:  Moderate Intensity Therapy  Discharge Equipment Recommendations: hospital bed   Justification for Equipment: Patient requires a hospital bed for positioning of the body in ways that are not feasible with an ordinary bed. The patient requires special positioning for pain relief, limited mobility, and/or being unable to independently make changes in body position without the use of a hospital bed. Pillows and wedges will not be adequate for resolving these positional issues.   Barriers to discharge: Evolving Clinical Presentation    Assessment:     Cali Mabry is a 58 y.o. male admitted with a medical diagnosis of Acute encephalopathy.  He presents with the following impairments/functional limitations:  weakness, impaired endurance, impaired cognition, impaired self care skills, impaired functional mobility, impaired balance, pain, decreased safety awareness, gait instability, decreased upper extremity function, decreased lower extremity function.     Pt mildly agitated, eager for discharge home, agreeable to bed mobility and repositioning only.    Rehab Prognosis: Poor; patient would benefit from acute skilled PT services to address these deficits and reach maximum level of function.    Recent Surgery: * No surgery found *      Treatment Tolerated: Fairly Poorly    Highest level of mobility achieved this visit: rolling WILDER and scooting with mod A    Activity with RN/PCT: bed mobility only with one person assist    Plan:     During this hospitalization, patient to be seen 3 x/week to address the identified rehab impairments via gait training, neuromuscular  "re-education, therapeutic exercises, therapeutic activities and progress toward the following goals:    Plan of Care Expires:  04/11/24    Subjective     RITA Cohen notified prior to session. No family present upon PT entrance into room.    Chief Complaint: ready to leave  Patient/Family Comments/goals: "Can you take me home"  Pain/Comfort:  Pain Rating 1: 0/10      Objective:     Additional staff present: OT Vicki    Patient found supine with: PureWick   Cognition:   Alert and Agitated  Command following: Follows one-step verbal commands  Fluency: clear/fluent  General Precautions: Standard, aspiration, fall   Orthopedic Precautions:N/A   Braces: N/A   Body mass index is 18.99 kg/m².  Oxygen Device: Room Air    Vitals: BP (!) 175/105   Pulse (!) 114   Temp 97.6 °F (36.4 °C) (Oral)   Resp 18   Ht 6' (1.829 m)   Wt 63.5 kg (140 lb)   SpO2 95%   BMI 18.99 kg/m²     Outcome Measures:  AM-PAC 6 CLICK MOBILITY  Turning over in bed (including adjusting bedclothes, sheets and blankets)?: 2  Sitting down on and standing up from a chair with arms (e.g., wheelchair, bedside commode, etc.): 1  Moving from lying on back to sitting on the side of the bed?: 2  Moving to and from a bed to a chair (including a wheelchair)?: 1  Need to walk in hospital room?: 1  Climbing 3-5 steps with a railing?: 1  Basic Mobility Total Score: 8     Functional Mobility:    Bed Mobility:   Rolling/Turning to Left: moderate assistance  Rolling/Turning to Right: moderate assistance  Scooting to HOB via supine bridge: moderate assistance    Sitting Balance at Edge of Bed: Declined    Education:  Time provided for education, counseling and discussion of health disposition in regards to patient's current status  All questions answered within PT scope of practice and to patient's satisfaction  PT role in POC to address current functional deficits    Patient left HOB elevated with call button in reach.    GOALS:   Multidisciplinary Problems  "      Physical Therapy Goals       Not on file              Multidisciplinary Problems (Resolved)          Problem: Physical Therapy    Goal Priority Disciplines Outcome Goal Variances Interventions   Physical Therapy Goal   (Resolved)     PT, PT/OT Met     Description: Goals to be met by: 2024     Patient will increase functional independence with mobility by performin. Supine to sit with Moderate Assistance  2. Sit to supine with Moderate Assistance  3. Sit to stand transfer with Moderate Assistance  4. Bed to chair transfer with Moderate Assistance using Rolling Walker  5. Gait  x 10 feet with Moderate Assistance using Rolling Walker.   6. Sitting at edge of bed x10 minutes with Minimal Assistance  7. Lower extremity exercise program x30 reps per handout, with assistance as needed  8. Propel wheelchair 40 feet using Bilateral upper extremities and minimal assistance                       Time Tracking:     PT Received On: 24  PT Start Time: 1356     PT Stop Time: 1406  PT Total Time (min): 10 min     Billable Minutes:   Therapeutic Activity 9 min    Treatment Type: Treatment  PT/PTA: PT       Andrea Borjas, PT, DPT  3/19/2024

## 2024-03-19 NOTE — PLAN OF CARE
[facility]      HOME HEALTH ORDERS  FACE TO FACE ENCOUNTER    Patient Name: Cali Mabry  YOB: 1965    PCP: Lawanda Flores NP   PCP Address: [address] / [id number]  PCP Phone Number: [phone]  PCP Fax: [phone]    Encounter Date: 3/4/24    Admit to Home Health    Diagnoses:  Active Hospital Problems    Diagnosis  POA    *Acute encephalopathy [G93.40]  Yes    Metabolic encephalopathy [G93.41]  Yes    COVID [U07.1]  Yes    Constipation [K59.00]  Yes    Acute cystitis without hematuria [N30.00]  Yes    Bipolar disorder [F31.9]  Yes     Chronic    Intermittent explosive disorder [F63.81]  Yes    Type 1 diabetes mellitus with hyperglycemia [E10.65]  Yes    CAD (coronary artery disease) [I25.10]  Yes     Chronic    COPD (chronic obstructive pulmonary disease) [J44.9]  Yes    Sepsis [A41.9]  Yes    Polysubstance use disorder [F19.90]  Yes    Hemiparesis affecting right side as late effect of stroke [I69.351]  Not Applicable    Anemia of chronic disease [D63.8]  Yes     Chronic    Primary hypertension [I10]  Yes      Resolved Hospital Problems   No resolved problems to display.       Follow Up Appointments:  No future appointments.    Allergies:Review of patient's allergies indicates:  No Known Allergies    Medications: Review discharge medications with patient and family and provide education.    Current Facility-Administered Medications   Medication Dose Route Frequency Provider Last Rate Last Admin    albuterol inhaler 2 puff  2 puff Inhalation Q6H PRN Raymon Moran MD        aspirin chewable tablet 81 mg  81 mg Oral Daily Raymon Moran MD   81 mg at 03/18/24 0917    atorvastatin tablet 20 mg  20 mg Oral Daily Raymon Moran MD   20 mg at 03/18/24 0917    benzonatate capsule 100 mg  100 mg Oral TID PRN Raymon Moran MD   100 mg at 03/12/24 2107    carBAMazepine tablet 200 mg  200 mg Oral Daily Kaleb Monson MD   200 mg at 03/18/24 0918     carBAMazepine tablet 400 mg  400 mg Oral QHS Kaleb Monson MD   400 mg at 03/17/24 2215    dextromethorphan-guaiFENesin  mg/5 ml liquid 5 mL  5 mL Oral Q4H PRN Raymon Moran MD   5 mL at 03/17/24 0230    dextrose 10% bolus 125 mL 125 mL  12.5 g Intravenous PRN Kaleb Monson MD   Stopped at 03/12/24 0652    dextrose 10% bolus 250 mL 250 mL  25 g Intravenous PRN Raymon Moran MD        enoxaparin injection 40 mg  40 mg Subcutaneous Daily Raymon Moran MD   40 mg at 03/18/24 1654    folic acid tablet 1 mg  1 mg Oral Daily Raymon Moran MD   1 mg at 03/18/24 0918    glucagon (human recombinant) injection 1 mg  1 mg Intramuscular PRN Raymon Moran MD        glucose chewable tablet 16 g  16 g Oral PRN Raymon Moran MD        glucose chewable tablet 24 g  24 g Oral PRN Raymon Moran MD        insulin aspart U-100 pen 0-5 Units  0-5 Units Subcutaneous QID (AC + HS) PRN Kaleb Monson MD   4 Units at 03/17/24 1843    insulin aspart U-100 pen 5 Units  5 Units Subcutaneous TIDWM Kin Kolb MD   5 Units at 03/18/24 1124    insulin detemir U-100 (Levemir) pen 10 Units  10 Units Subcutaneous BID Kaleb Monson MD   10 Units at 03/18/24 0913    labetalol 20 mg/4 mL (5 mg/mL) IV syring  10 mg Intravenous Q6H PRN Kaleb Monson MD   10 mg at 03/09/24 0619    losartan tablet 100 mg  100 mg Oral Daily Siddhartha Zavala MD   100 mg at 03/18/24 0918    melatonin tablet 6 mg  6 mg Oral Nightly Raymon Moran MD   6 mg at 03/17/24 2215    multivitamin tablet  1 tablet Oral Daily Raymon Moran MD   1 tablet at 03/18/24 0917    naloxone 0.4 mg/mL injection 0.02 mg  0.02 mg Intravenous PRN Raymon Moran MD        NIFEdipine 24 hr tablet 60 mg  60 mg Oral Daily Siddhartha Zavala MD   60 mg at 03/18/24 0918    OLANZapine injection 5 mg  5 mg Intramuscular TID PRN Dominik Fine MD        ondansetron disintegrating tablet 8 mg  8 mg Oral Q8H PRN Raymon Moran MD         polyethylene glycol packet 17 g  17 g Oral Q6H WAKE Doroteo Vyas MD   17 g at 03/18/24 1343    senna-docusate 8.6-50 mg per tablet 2 tablet  2 tablet Oral BID Kaleb Monson MD   2 tablet at 03/18/24 0917    sodium chloride 0.9% flush 10 mL  10 mL Intravenous Q12H PRN Raymon Moran MD        thiamine tablet 100 mg  100 mg Oral Daily Raymon Moran MD   100 mg at 03/18/24 0918    venlafaxine 24 hr capsule 75 mg  75 mg Oral Daily Raymon Moran MD   75 mg at 03/18/24 0918     Current Discharge Medication List        START taking these medications    Details   carBAMazepine (TEGRETOL) 200 mg tablet Take 2 tablets (400 mg total) by mouth every evening.  Qty: 60 tablet, Refills: 3      NIFEdipine (PROCARDIA-XL) 60 MG (OSM) 24 hr tablet Take 1 tablet (60 mg total) by mouth once daily.  Qty: 30 tablet, Refills: 3    Comments: .           CONTINUE these medications which have CHANGED    Details   insulin detemir U-100, Levemir, 100 unit/mL (3 mL) SubQ InPn pen Inject 10 Units into the skin 2 (two) times daily. Inject 10 units under the skin in the morning and 10 units in the evening.  Qty: 6 mL, Refills: 11      insulin lispro 100 unit/mL pen Inject 5 Units into the skin 3 (three) times daily before meals.  Qty: 3 mL, Refills: 2      losartan (COZAAR) 100 MG tablet Take 1 tablet (100 mg total) by mouth once daily.  Qty: 90 tablet, Refills: 3    Comments: .      venlafaxine (EFFEXOR-XR) 75 MG 24 hr capsule Take 1 capsule (75 mg total) by mouth once daily.  Qty: 30 capsule, Refills: 3           CONTINUE these medications which have NOT CHANGED    Details   albuterol (PROVENTIL/VENTOLIN HFA) 90 mcg/actuation inhaler Inhale 1-2 puffs into the lungs every 6 (six) hours as needed for Wheezing. Rescue  Qty: 18 g, Refills: 0      aspirin 81 MG Chew Take 1 tablet (81 mg total) by mouth once daily.  Qty: 90 tablet, Refills: 3      atorvastatin (LIPITOR) 20 MG tablet Take 1 tablet (20 mg total) by mouth once daily.  Qty:  "90 tablet, Refills: 3      BD GERTRUDIS 2ND GEN PEN NEEDLE 32 gauge x 5/32" Ndle Inject into the skin 4 (four) times daily.      carBAMazepine (TEGRETOL XR) 200 MG 12 hr tablet Take 1 tablet (200 mg total) by mouth once daily.  Qty: 30 tablet, Refills: 3      FREESTYLE SEBASTIAN 2 SENSOR Kit USE FOUR TIMES PER WEEK AS DIRECTED      levocetirizine (XYZAL) 5 MG tablet Take 5 mg by mouth every evening.      folic acid (FOLVITE) 1 MG tablet Take 1 tablet (1 mg total) by mouth once daily.  Qty: 90 tablet, Refills: 3      naloxone (NARCAN) 4 mg/actuation Spry 4mg by nasal route as needed for opioid overdose; may repeat every 2-3 minutes in alternating nostrils until medical help arrives. Call 911  Qty: 1 each, Refills: 11      thiamine 100 MG tablet Take 1 tablet (100 mg total) by mouth once daily.  Qty: 90 tablet, Refills: 0           STOP taking these medications       melatonin (MELATIN) 3 mg tablet Comments:   Reason for Stopping:         multivitamin Tab Comments:   Reason for Stopping:                 I have seen and examined this patient within the last 30 days. My clinical findings that support the need for the home health skilled services and home bound status are the following:no   Weakness/numbness causing balance and gait disturbance due to Weakness/Debility making it taxing to leave home.  Mental confusion making it unsafe for patient to leave home alone due to  Encephalopathy.     Diet:   cardiac diet and diabetic diet 2000 calorie    Labs: None      Referrals/ Consults  Physical Therapy to evaluate and treat. Evaluate for home safety and equipment needs; Establish/upgrade home exercise program. Perform / instruct on therapeutic exercises, gait training, transfer training, and Range of Motion.  Occupational Therapy to evaluate and treat. Evaluate home environment for safety and equipment needs. Perform/Instruct on transfers, ADL training, ROM, and therapeutic exercises.  Aide to provide assistance with personal care, " ADLs, and vital signs.    Activities:   activity as tolerated    Nursing:   Agency to admit patient within 24 hours of hospital discharge unless specified on physician order or at patient request    SN to complete comprehensive assessment including routine vital signs. Instruct on disease process and s/s of complications to report to MD. Review/verify medication list sent home with the patient at time of discharge  and instruct patient/caregiver as needed. Frequency may be adjusted depending on start of care date.     Skilled nurse to perform up to 3 visits PRN for symptoms related to diagnosis    Notify MD if SBP > 160 or < 90; DBP > 90 or < 50; HR > 120 or < 50; Temp > 101; O2 < 88%;     Ok to schedule additional visits based on staff availability and patient request on consecutive days within the home health episode.    When multiple disciplines ordered:    Start of Care occurs on Sunday - Wednesday schedule remaining discipline evaluations as ordered on separate consecutive days following the start of care.    Thursday SOC -schedule subsequent evaluations Friday and Monday the following week.     Friday - Saturday SOC - schedule subsequent discipline evaluations on consecutive days starting Monday of the following week.    For all post-discharge communication and subsequent orders please contact patient's primary care physician.   Miscellaneous   Routine Skin for Bedridden Patients: Instruct patient/caregiver to apply moisture barrier cream to all skin folds and wet areas in perineal area daily and after baths and all bowel movements.  Diabetic Care:   SN to perform and educate Diabetic management with blood glucose monitoring:    Home Health Aide:  Physical Therapy Three times weekly, Occupational Therapy Three times weekly, and Home Health Aide Three times weekly    Wound Care Orders  no    I certify that this patient is confined to his home and needs intermittent skilled nursing care, physical therapy, and  occupational therapy.

## 2024-03-19 NOTE — PLAN OF CARE
Spoke with patient's mother Marilou to verify that patient that someone is home to receive patient at [address] [place] [address] [place] [address]. Patient's mom states that she is home to receive the patient from ambulance.    Transportation scheduled for next available time. Informed patient's nurse Vicki via [facility].    PHU Kim  Case Management  [phone]

## 2024-03-19 NOTE — NURSING
Patient keeps yelling out wondering when his ride is coming. I have told him over and over again he has to be have patience.

## 2024-03-19 NOTE — DISCHARGE SUMMARY
[facility] Medicine  Discharge Summary      Patient Name: Cali Mabry  MRN: [id number]  ZINA: [id number]  Patient Class: IP- Inpatient  Admission Date: 3/4/2024  Hospital Length of Stay: 15 days  Discharge Date and Time:  03/19/2024 4:20 PM  Attending Physician: Doroteo Vyas MD   Discharging Provider: Pieter Maya DO  Primary Care Provider: Lawanda Flores NP  Hospital Medicine Team: [facility] 2 Pieter Maya DO  Primary Care Team: [facility] 2    HPI:   58 M with DM1, extensive IVDU, HCV Ab+, HTN, previous CVA with R sided deficits, HFpEF, CAD, chronic pancreatitis, mixed mood / psychiatric disorder presenting to [facility] [place] with chief complaint of weakness of unspecified duration and onset. Admits to using shooting methamphetamines. Rest of history from patient is non-contributory due to uncooperation and alternating agitation and somnolence. HPI is written with review of previous medical records, ED records, discussed with patient's mother via telephone, who was able to provide limited insight into the patient's acute medical condition. Patient's mother states that patient's PO intake has decreased but he would not cooperate with her nor would he tell her what was bothering him. Patient reported to want to stay in bed for the last 2 days.     Of note, patient was recently discharged from [facility] following treatment of DKA and UTI (largely pan-sensitive Proteus mirabilis). In ED, patient was extremely agitated, given multiple doses of Ativan due to agitation, aggressive/abusive statements towards nursing and medical staff. Tachycardic to 110s, hypertensive, febrile to 101.5. Given broad spectrum Abx (Vanc/Zosyn). PEC'ed by ED due to extreme agitation.    * No surgery found *      Hospital Course:   Patient admitted for altered mental status. Suspect it was secondary to UTI and COVID infection. UDS negative. Patient was PEC'd while in the ED for agitation. Patients mental status  improving. Will consult psychiatry if patient needs psych placement. Finished remdesivir course, not hypoxic. Improving respiratory status. Minor episodes of cough. Reviewed previous A1C from 2 months on 11.3. uptitrated insulin regimen. Psychiatry following, patient with intermittent explosive disorder, and recommend maintaining current regimen, as well as PEC as he is confused. Current presentation consistent with encephalopathy rather than psychosis or vero. Psych not recommending Psych placement. Once he is back to baseline, they will reassess need for psych placement. Titrating blood glucose. Continues on PO cefpodoxime. Neurology consulted for NPH, recommending outpatient neurosurgery evaluation. Episode of hypertension, controlled wit PRN labetalol. Started on PO amlodipine for BP control. Decreased risperidone dose as per psychiatry recs given patient's tone is increased on examination. Hypoglycemic overnight, started on D10 bolus, improved. Will reach out again to family for new collaterals on patient baseline status. Somnolent on examination. Optimizing blood glucose management, PRN zyprexa for agitation only. Increased tegretol dose to 200 AM, 400PM as per psychiatry recommendations. Continue holding risperidone. Patient somnolent. No new episodes of agitation for more than 48hs. Optimizing BG. EEG with isolated discharges R > L without seizure activity. After obtaining further collateral from the family, family reports the patient has been at physical and mental baseline prior to previous admission. On 03/19, patient was discharged home with HH in stable condition.     Goals of Care Treatment Preferences:  Code Status: Full Code      Consults:   Consults (From admission, onward)          Status Ordering Provider     Inpatient consult to Neurology  Once        Provider:  (Not yet assigned)    Completed MARIZA LOERA     Inpatient consult to Psychiatry  Once        Provider:  (Not yet assigned)     "[facility]  Once        Provider:  (Not yet assigned)    Completed SANTOS GUILLORY-ON            Neuro  * Acute encephalopathy  Likely 2/2 to drug use, but underlying COVID+ infection and possible UTI may be contributing factors. Utox negative, but reported history of recent methamphetamine use.     -- Patient improving on mental status   -- PEC'ed patient prior to admission to . (Expires 03/19)  -- Capacity evaluation QD  -- PRN Zyprexa for non-redirectable agitation / violence  -- Treat underlying infection  -- See Bipolar Disorder A/P for home medications  - Negative encephalopathy workup  - High risk of seizures, given prior history of stroke. "This is an abnormal continuous EEG monitoring study because of a moderate encephalopathy with evidence of cortical irritation and a potential seizure foci bilaterally, right>left.  There are no pushbutton activations and no electrographic seizures."   - Not on anti-epileptics as IP or at home  - Psychiatry following, at the moment not a candidate for IP psych placement given persistent delirium     Renal/  Acute cystitis without hematuria  -Resolved.       Final Active Diagnoses:    Diagnosis Date Noted POA    PRINCIPAL PROBLEM:  Acute encephalopathy [G93.40] 03/04/2024 Yes    Metabolic encephalopathy [G93.41] 03/15/2024 Yes    COVID [U07.1] 03/04/2024 Yes    Constipation [K59.00] 03/04/2024 Yes    Acute cystitis without hematuria [N30.00] 12/12/2023 Yes    Bipolar disorder [F31.9] 08/30/2023 Yes     Chronic    Intermittent explosive disorder [F63.81] 08/19/2023 Yes    Type 1 diabetes mellitus with hyperglycemia [E10.65] 05/02/2021 Yes    CAD (coronary artery disease) [I25.10] 08/13/2020 Yes     Chronic    COPD (chronic obstructive pulmonary disease) [J44.9] 08/08/2020 Yes    Sepsis [A41.9] 03/01/2020 Yes    Polysubstance use disorder [F19.90] 03/12/2019 Yes    Hemiparesis affecting right side as late effect of " stroke [I69.351] 11/23/2017 Not Applicable    Anemia of chronic disease [D63.8] 04/03/2016 Yes     Chronic    Primary hypertension [I10] 04/03/2016 Yes      Problems Resolved During this Admission:       Discharged Condition: stable    Disposition: Home-Health Care [facility]    Follow Up:   Follow-up Information       Lawanda Flores NP Follow up.    Specialty: Family Medicine  Contact information:  [address]  [address]  [place] [address]  [phone]                           Patient Instructions:      HOSPITAL BED FOR HOME USE     Order Specific Question Answer Comments   Type: Semi-electric    Length of need (1-99 months): 99    Does patient have medical equipment at home? cane, straight    Height: 6' (1.829 m)    Weight: 63.5 kg (140 lb)    Please check all that apply: Patient requires positioning of the body in ways not feasible in an ordinary bed due to a medical condition which is expected to last at least one month.    Please check all that apply: Patient requires, for the alleviation of pain, positioning of the body in ways not feasible in an ordinary bed.    Please check all that apply: Patient requires a bed height different than a fixed height hospital bed to permit transfers to chair, wheelchair, or standing.    Please check all that apply: Patient requires frequent changes in body position and/or has an immediate need for a change in body position.      Ambulatory referral/consult to Neurosurgery   Standing Status: Future   Referral Priority: Routine Referral Type: Consultation   Referral Reason: Specialty Services Required   Requested Specialty: Neurosurgery   Number of Visits Requested: 1     Ambulatory referral/consult to Neurology   Standing Status: Future   Referral Priority: Routine Referral Type: Consultation   Referral Reason: Specialty Services Required   Requested Specialty: Neurology   Number of Visits Requested: 1       Significant Diagnostic Studies: N/A    Pending Diagnostic Studies:        None           Medications:  Reconciled Home Medications:      Medication List        START taking these medications      NIFEdipine 60 MG (OSM) 24 hr tablet  Commonly known as: PROCARDIA-XL  Take 1 tablet (60 mg total) by mouth once daily.            CHANGE how you take these medications      * carBAMazepine 200 MG 12 hr tablet  Commonly known as: TEGRETOL XR  Take 1 tablet (200 mg total) by mouth once daily.  What changed: Another medication with the same name was added. Make sure you understand how and when to take each.     * carBAMazepine 200 mg tablet  Commonly known as: TEGRETOL  Take 2 tablets (400 mg total) by mouth every evening.  What changed: You were already taking a medication with the same name, and this prescription was added. Make sure you understand how and when to take each.     insulin detemir U-100 (Levemir) 100 unit/mL (3 mL) Inpn pen  Inject 10 Units into the skin 2 (two) times daily. Inject 10 units under the skin in the morning and 10 units in the evening.  What changed:   how much to take  when to take this  additional instructions     insulin lispro 100 unit/mL pen  Inject 5 Units into the skin 3 (three) times daily before meals.  What changed: how much to take     losartan 100 MG tablet  Commonly known as: COZAAR  Take 1 tablet (100 mg total) by mouth once daily.  What changed:   medication strength  how much to take           * This list has 2 medication(s) that are the same as other medications prescribed for you. Read the directions carefully, and ask your doctor or other care provider to review them with you.                CONTINUE taking these medications      albuterol 90 mcg/actuation inhaler  Commonly known as: PROVENTIL/VENTOLIN HFA  Inhale 1-2 puffs into the lungs every 6 (six) hours as needed for Wheezing. Rescue     aspirin 81 MG Chew  Take 1 tablet (81 mg total) by mouth once daily.     atorvastatin 20 MG tablet  Commonly known as: LIPITOR  Take 1 tablet (20 mg total)  "by mouth once daily.     BD GERTRUDIS 2ND GEN PEN NEEDLE 32 gauge x 5/32" Ndle  Generic drug: pen needle, diabetic  Inject into the skin 4 (four) times daily.     folic acid 1 MG tablet  Commonly known as: FOLVITE  Take 1 tablet (1 mg total) by mouth once daily.     FREESTYLE SEBASTIAN 2 SENSOR Kit  Generic drug: flash glucose sensor  USE FOUR TIMES PER WEEK AS DIRECTED     levocetirizine 5 MG tablet  Commonly known as: XYZAL  Take 5 mg by mouth every evening.     naloxone 4 mg/actuation Spry  Commonly known as: NARCAN  4mg by nasal route as needed for opioid overdose; may repeat every 2-3 minutes in alternating nostrils until medical help arrives. Call 911     thiamine 100 MG tablet  Take 1 tablet (100 mg total) by mouth once daily.     venlafaxine 75 MG 24 hr capsule  Commonly known as: EFFEXOR-XR  Take 1 capsule (75 mg total) by mouth once daily.              Indwelling Lines/Drains at time of discharge:   Lines/Drains/Airways       None                   Time spent on the discharge of patient: 35 minutes         Pieter Maya DO  Department of Hospital Medicine  [facility] - Med Surg  "

## 2024-03-19 NOTE — PT/OT/SLP PROGRESS
Occupational Therapy      Patient Name:  Cali Mabry   MRN:  [id number]    Patient deferring edge of bed/out of bed activity on this date; assisted with repositioning and postural alignment with head of bed elevated as patient propped up on bed rail; required max A x 2 persons to scoot laterally in bed/to head of bed and for repositioning of BLE's to promote joint integrity.    3/19/2024  [id number]

## 2024-03-20 NOTE — PLAN OF CARE
[facility] Med Surg  Discharge Final Note    Primary Care Provider: Lawanda Flores NP    Expected Discharge Date: 3/19/2024      Patient discharged to home with mother and sister. Patient unable to acquire home health due to several refusals (psychiatric history). Follow up appointments scheduled and placed in AVS. Discharge Plan A and Plan B have been determined by review of patient's clinical status, future medical and therapeutic needs, and coverage/benefits for post-acute care in coordination with multidisciplinary team members.  Final Discharge Note (most recent)       Final Note - 03/20/24 0837          Final Note    Assessment Type Final Discharge Note     Anticipated Discharge Disposition Home or Self Care (P)      Hospital Resources/Appts/Education Provided Appointments scheduled and added to AVS (P)         Post-Acute Status    Home Health Status Discharge Plan Changed (P)      Coverage [facility] (P)      Discharge Delays None known at this time (P)                      Important Message from Medicare  Important Message from Medicare regarding Discharge Appeal Rights: Given to patient/caregiver, Explained to patient/caregiver, Signed/date by patient/caregiver     Date IMM was signed: 03/19/24  Time IMM was signed: 1321    Contact Info       Lawanda Flores NP   Specialty: Family Medicine   Relationship: PCP - General    [address]  [address]  [place] [address]   Phone: [phone]       Next Steps: Follow up    [facility]   Specialty: Neurology    [address]  [facility] [address]   Phone: [phone]       Next Steps: Schedule an appointment as soon as possible for a visit            PHU Kim  Case Management  [phone]

## 2024-04-17 NOTE — ED TRIAGE NOTES
Pt presents to ED via EMS from home with c/o SOB. Per EMS pt has hx of COPD and  RA sat 78% upon their arrival. + wheezing. + smoker. Pt given duoneb with additional albuterol and  placed on CPAP  While in route to ED with EMS. Pt was up to 99% upon ED arrival on CPAP. Moderate distress is noted. Will continue to be monitored.      for hand placement and sequencing/verbal cues/1 person assist

## 2024-04-20 ENCOUNTER — DOCUMENT SCAN (OUTPATIENT)
Dept: HOME HEALTH SERVICES | Facility: HOSPITAL | Age: 59
End: 2024-04-20
Payer: MEDICARE

## 2024-05-29 NOTE — ASSESSMENT & PLAN NOTE
"Patient ID: Emmanuel Osborne \"Violette" is a 80 y.o. male.    L Inj/Asp: bilateral knee on 5/29/2024 10:45 AM  Indications: pain and joint swelling  Details: 22 G needle, ultrasound-guided superolateral approach  Medications (Right): 30 mg hyaluronan 30 mg/2 mL  Medications (Left): 30 mg hyaluronan 30 mg/2 mL  Outcome: tolerated well, no immediate complications  Procedure, treatment alternatives, risks and benefits explained, specific risks discussed. Consent was given by the patient. Immediately prior to procedure a time out was called to verify the correct patient, procedure, equipment, support staff and site/side marked as required. Patient was prepped and draped in the usual sterile fashion.         Patient presents here today for bilateral Orthovisc injections #1 of 3.    Overall states he is feeling well not having any significant issues or concerns at present.  See him back in 1 and 2 weeks respectively for his second and third shots.    At the conclusion of the visit there were no further questions by the patient/family regarding their plan of care.  Patient was instructed to call or return with any issues, questions, or concerns regarding their injury and/or treatment plan described above.    This note was prepared using voice recognition software.  The details of this note are correct and have been reviewed, and corrected to the best of my ability.  Some grammatical errors may persist related to the Dragon software     Cole C. Budinsky, MD  Office: [phone]  " Patient's FSGs are uncontrolled due to hyperglycemia on current medication regimen.  Last A1c reviewed:  Lab Results   Component Value Date    HGBA1C 11.3 (H) 12/10/2023     Antihyperglycemics (From admission, onward)      Start     Stop Route Frequency Ordered    12/18/23 0900  insulin detemir U-100 (Levemir) pen 10 Units  (Order Panel)         -- SubQ 2 times daily 12/17/23 2226    12/17/23 2048  insulin aspart U-100 pen 0-5 Units         -- SubQ Before meals & nightly PRN 12/17/23 1948    12/17/23 1645  insulin aspart U-100 pen 6 Units  (Order Panel)         -- SubQ 3 times daily with meals 12/17/23 1241          Hold Oral hypoglycemics while patient is in the hospital.  Patient reports that he does not know how much insulin he takes at home  Insulin infusion initiated upon admission; no longer requiring insulin gtt  Continue to monitor glucose trend and titrate insulin regimen as appropriate.  Pt has epidoes of hypoglycemia and hyperglyemia. Glucose labile  POCT BG q4h  Insulin dose adjusted  Meds: basal bolus insulin + SSI PRN to maintain goal 140-180  ADA diet, accuchecks ACHS, hypoglycemic protocol

## 2024-06-10 ENCOUNTER — EXTERNAL HOME HEALTH (OUTPATIENT)
Dept: HOME HEALTH SERVICES | Facility: HOSPITAL | Age: 59
End: 2024-06-10
Payer: MEDICARE

## 2024-06-10 PROBLEM — A41.9 SEPSIS: Status: RESOLVED | Noted: 2020-03-01 | Resolved: 2024-06-10

## 2024-06-11 ENCOUNTER — EXTERNAL HOME HEALTH (OUTPATIENT)
Dept: HOME HEALTH SERVICES | Facility: HOSPITAL | Age: 59
End: 2024-06-11
Payer: MEDICARE

## 2024-06-19 ENCOUNTER — DOCUMENT SCAN (OUTPATIENT)
Dept: HOME HEALTH SERVICES | Facility: HOSPITAL | Age: 59
End: 2024-06-19
Payer: MEDICARE

## 2024-06-25 NOTE — PROGRESS NOTES
Dr Rene notified by Krista Bustamante RN that pt wants to leave AMA. Dr Magaña @  discussing with pt his plans to leave AMA.    Forms received. Awaiting PCPs signature. Forms will be in needs to be signed folder at Mon[facility] desk.

## 2024-07-07 NOTE — ASSESSMENT & PLAN NOTE
Problem: Adult Inpatient Plan of Care  Goal: Plan of Care Review  Outcome: Progressing  Goal: Patient-Specific Goal (Individualized)  Outcome: Progressing  Goal: Absence of Hospital-Acquired Illness or Injury  Outcome: Progressing  Goal: Optimal Comfort and Wellbeing  Outcome: Progressing  Goal: Readiness for Transition of Care  Outcome: Progressing     Problem: Skin Injury Risk Increased  Goal: Skin Health and Integrity  Outcome: Progressing     Problem: Bariatric Environmental Safety  Goal: Safety Maintained with Care  Outcome: Progressing     Problem: Comorbidity Management  Goal: Maintenance of COPD Symptom Control  Outcome: Progressing  Goal: Maintenance of Heart Failure Symptom Control  Outcome: Progressing  Goal: Blood Pressure in Desired Range  Outcome: Progressing     Problem: Gas Exchange Impaired  Goal: Optimal Gas Exchange  Outcome: Progressing     Problem: Diabetes Comorbidity  Goal: Blood Glucose Level Within Targeted Range  Outcome: Progressing     Problem: Heart Failure  Goal: Optimal Coping  Outcome: Progressing  Goal: Optimal Cardiac Output  Outcome: Progressing  Goal: Stable Heart Rate and Rhythm  Outcome: Progressing  Goal: Optimal Functional Ability  Outcome: Progressing  Goal: Fluid and Electrolyte Balance  Outcome: Progressing  Goal: Improved Oral Intake  Outcome: Progressing  Goal: Effective Oxygenation and Ventilation  Outcome: Progressing  Goal: Effective Breathing Pattern During Sleep  Outcome: Progressing     Problem: Chronic Kidney Disease  Goal: Optimal Coping with Chronic Illness  Outcome: Progressing  Goal: Electrolyte Balance  Outcome: Progressing  Goal: Fluid Balance  Outcome: Progressing  Goal: Optimal Functional Ability  Outcome: Progressing  Goal: Absence of Anemia Signs and Symptoms  Outcome: Progressing  Goal: Optimal Oral Intake  Outcome: Progressing  Goal: Acceptable Pain Control  Outcome: Progressing  Goal: Minimize Renal Failure Effects  Outcome: Progressing     Problem:  From hyperosmolar state. Will improve with fluids.      Diabetic Ketoacidosis  Goal: Optimal Coping  Outcome: Progressing  Goal: Fluid and Electrolyte Balance with Absence of Ketosis  Outcome: Progressing

## 2024-07-28 ENCOUNTER — HOSPITAL ENCOUNTER (INPATIENT)
Facility: HOSPITAL | Age: 59
LOS: 1 days | Discharge: HOME | DRG: 190 | End: 2024-07-29
Attending: EMERGENCY MEDICINE | Admitting: STUDENT IN AN ORGANIZED HEALTH CARE EDUCATION/TRAINING PROGRAM
Payer: MEDICARE

## 2024-07-28 DIAGNOSIS — J44.1 COPD EXACERBATION: ICD-10-CM

## 2024-07-28 DIAGNOSIS — R00.0 TACHYCARDIA: ICD-10-CM

## 2024-07-28 DIAGNOSIS — J18.9 PNEUMONIA OF BOTH LOWER LOBES DUE TO INFECTIOUS ORGANISM: Primary | ICD-10-CM

## 2024-07-28 DIAGNOSIS — R73.9 HYPERGLYCEMIA: ICD-10-CM

## 2024-07-28 PROBLEM — I50.9 CONGESTIVE HEART FAILURE: Status: ACTIVE | Noted: 2024-07-28

## 2024-07-28 PROBLEM — B19.20 HEPATITIS C INFECTION: Status: ACTIVE | Noted: 2024-07-28

## 2024-07-28 PROBLEM — F12.10 MARIJUANA ABUSE: Status: RESOLVED | Noted: 2018-08-05 | Resolved: 2024-07-28

## 2024-07-28 PROBLEM — F19.90 POLYSUBSTANCE USE DISORDER: Status: RESOLVED | Noted: 2019-03-12 | Resolved: 2024-07-28

## 2024-07-28 LAB
ALBUMIN SERPL BCP-MCNC: 3.6 G/DL (ref 3.5–5.2)
ALLENS TEST: ABNORMAL
ALLENS TEST: NORMAL
ALP SERPL-CCNC: 103 U/L (ref 55–135)
ALT SERPL W/O P-5'-P-CCNC: 13 U/L (ref 10–44)
AMPHET+METHAMPHET UR QL: NEGATIVE
ANION GAP SERPL CALC-SCNC: 13 MMOL/L (ref 8–16)
ANION GAP SERPL CALC-SCNC: 13 MMOL/L (ref 8–16)
ANION GAP SERPL CALC-SCNC: 14 MMOL/L (ref 8–16)
AST SERPL-CCNC: 15 U/L (ref 10–40)
B-OH-BUTYR BLD STRIP-SCNC: 0.6 MMOL/L (ref 0–0.5)
BACTERIA #/AREA URNS HPF: ABNORMAL /HPF
BARBITURATES UR QL SCN>200 NG/ML: NEGATIVE
BASOPHILS # BLD AUTO: 0.03 K/UL (ref 0–0.2)
BASOPHILS NFR BLD: 0.3 % (ref 0–1.9)
BENZODIAZ UR QL SCN>200 NG/ML: NEGATIVE
BILIRUB SERPL-MCNC: 0.3 MG/DL (ref 0.1–1)
BILIRUB UR QL STRIP: NEGATIVE
BNP SERPL-MCNC: 30 PG/ML (ref 0–99)
BUN SERPL-MCNC: 12 MG/DL (ref 6–20)
BUN SERPL-MCNC: 17 MG/DL (ref 6–20)
BUN SERPL-MCNC: 20 MG/DL (ref 6–20)
BZE UR QL SCN: NEGATIVE
CALCIUM SERPL-MCNC: 9 MG/DL (ref 8.7–10.5)
CALCIUM SERPL-MCNC: 9.2 MG/DL (ref 8.7–10.5)
CALCIUM SERPL-MCNC: 9.3 MG/DL (ref 8.7–10.5)
CANNABINOIDS UR QL SCN: NEGATIVE
CHLORIDE SERPL-SCNC: 102 MMOL/L (ref 95–110)
CHLORIDE SERPL-SCNC: 103 MMOL/L (ref 95–110)
CHLORIDE SERPL-SCNC: 99 MMOL/L (ref 95–110)
CLARITY UR: CLEAR
CO2 SERPL-SCNC: 20 MMOL/L (ref 23–29)
CO2 SERPL-SCNC: 21 MMOL/L (ref 23–29)
CO2 SERPL-SCNC: 22 MMOL/L (ref 23–29)
COLOR UR: COLORLESS
CREAT SERPL-MCNC: 1.2 MG/DL (ref 0.5–1.4)
CREAT UR-MCNC: 31.5 MG/DL (ref 23–375)
DELSYS: ABNORMAL
DELSYS: NORMAL
DIFFERENTIAL METHOD BLD: ABNORMAL
EOSINOPHIL # BLD AUTO: 0.8 K/UL (ref 0–0.5)
EOSINOPHIL NFR BLD: 7.8 % (ref 0–8)
EP: 8
ERYTHROCYTE [DISTWIDTH] IN BLOOD BY AUTOMATED COUNT: 14.5 % (ref 11.5–14.5)
ERYTHROCYTE [SEDIMENTATION RATE] IN BLOOD BY WESTERGREN METHOD: 18 MM/H
EST. GFR  (NO RACE VARIABLE): >60 ML/MIN/1.73 M^2
FIO2: 21
FIO2: 5
FIO2: 50
FIO2: 50
GLUCOSE SERPL-MCNC: 357 MG/DL (ref 70–110)
GLUCOSE SERPL-MCNC: 458 MG/DL (ref 70–110)
GLUCOSE SERPL-MCNC: 470 MG/DL (ref 70–110)
GLUCOSE UR QL STRIP: ABNORMAL
HCO3 UR-SCNC: 15.8 MMOL/L (ref 24–28)
HCO3 UR-SCNC: 24.2 MMOL/L (ref 24–28)
HCO3 UR-SCNC: 30.2 MMOL/L (ref 24–28)
HCT VFR BLD AUTO: 43 % (ref 40–54)
HGB BLD-MCNC: 13 G/DL (ref 14–18)
HGB UR QL STRIP: NEGATIVE
IMM GRANULOCYTES # BLD AUTO: 0.03 K/UL (ref 0–0.04)
IMM GRANULOCYTES NFR BLD AUTO: 0.3 % (ref 0–0.5)
IP: 16
KETONES UR QL STRIP: ABNORMAL
LACTATE SERPL-SCNC: 1.9 MMOL/L (ref 0.5–2.2)
LDH SERPL L TO P-CCNC: 1.44 MMOL/L (ref 0.5–2.2)
LEUKOCYTE ESTERASE UR QL STRIP: ABNORMAL
LYMPHOCYTES # BLD AUTO: 2.3 K/UL (ref 1–4.8)
LYMPHOCYTES NFR BLD: 23.8 % (ref 18–48)
MAGNESIUM SERPL-MCNC: 3.7 MG/DL (ref 1.6–2.6)
MCH RBC QN AUTO: 24.3 PG (ref 27–31)
MCHC RBC AUTO-ENTMCNC: 30.2 G/DL (ref 32–36)
MCV RBC AUTO: 80 FL (ref 82–98)
METHADONE UR QL SCN>300 NG/ML: NEGATIVE
MICROSCOPIC COMMENT: ABNORMAL
MIN VOL: 13.1
MODE: ABNORMAL
MODE: NORMAL
MONOCYTES # BLD AUTO: 0.8 K/UL (ref 0.3–1)
MONOCYTES NFR BLD: 8.3 % (ref 4–15)
NEUTROPHILS # BLD AUTO: 5.8 K/UL (ref 1.8–7.7)
NEUTROPHILS NFR BLD: 59.5 % (ref 38–73)
NITRITE UR QL STRIP: POSITIVE
NRBC BLD-RTO: 0 /100 WBC
OHS QRS DURATION: 92 MS
OHS QTC CALCULATION: 457 MS
OPIATES UR QL SCN: NEGATIVE
PCO2 BLDA: 38 MMHG (ref 35–45)
PCO2 BLDA: 43.9 MMHG (ref 35–45)
PCO2 BLDA: 69.8 MMHG (ref 35–45)
PCP UR QL SCN>25 NG/ML: NEGATIVE
PH SMN: 7.16 [PH] (ref 7.35–7.45)
PH SMN: 7.24 [PH] (ref 7.35–7.45)
PH SMN: 7.41 [PH] (ref 7.35–7.45)
PH UR STRIP: 6 [PH] (ref 5–8)
PHOSPHATE SERPL-MCNC: 3.3 MG/DL (ref 2.7–4.5)
PLATELET # BLD AUTO: 403 K/UL (ref 150–450)
PMV BLD AUTO: 9.7 FL (ref 9.2–12.9)
PO2 BLDA: 27 MMHG (ref 40–60)
PO2 BLDA: 29 MMHG (ref 40–60)
PO2 BLDA: 62 MMHG (ref 80–100)
POC BE: -12 MMOL/L
POC BE: 0 MMOL/L
POC BE: 1 MMOL/L
POC SATURATED O2: 39 % (ref 95–100)
POC SATURATED O2: 41 % (ref 95–100)
POC SATURATED O2: 92 % (ref 95–100)
POC TCO2: 17 MMOL/L (ref 24–29)
POC TCO2: 25 MMOL/L (ref 23–27)
POC TCO2: 32 MMOL/L (ref 24–29)
POCT GLUCOSE: 285 MG/DL (ref 70–110)
POCT GLUCOSE: 423 MG/DL (ref 70–110)
POCT GLUCOSE: 427 MG/DL (ref 70–110)
POCT GLUCOSE: 459 MG/DL (ref 70–110)
POCT GLUCOSE: 471 MG/DL (ref 70–110)
POCT GLUCOSE: 485 MG/DL (ref 70–110)
POCT GLUCOSE: 485 MG/DL (ref 70–110)
POTASSIUM SERPL-SCNC: 4.4 MMOL/L (ref 3.5–5.1)
POTASSIUM SERPL-SCNC: 4.4 MMOL/L (ref 3.5–5.1)
POTASSIUM SERPL-SCNC: 4.5 MMOL/L (ref 3.5–5.1)
PROT SERPL-MCNC: 8 G/DL (ref 6–8.4)
PROT UR QL STRIP: ABNORMAL
RBC # BLD AUTO: 5.35 M/UL (ref 4.6–6.2)
RBC #/AREA URNS HPF: 4 /HPF (ref 0–4)
SAMPLE: ABNORMAL
SAMPLE: NORMAL
SITE: ABNORMAL
SITE: NORMAL
SODIUM SERPL-SCNC: 135 MMOL/L (ref 136–145)
SODIUM SERPL-SCNC: 135 MMOL/L (ref 136–145)
SODIUM SERPL-SCNC: 137 MMOL/L (ref 136–145)
SP GR UR STRIP: 1.02 (ref 1–1.03)
SP02: 100
SP02: 96
SP02: 98
SP02: 99
SPONT RATE: 26
SPONT RATE: 30
SPONT RATE: 30
TOXICOLOGY INFORMATION: NORMAL
TROPONIN I SERPL DL<=0.01 NG/ML-MCNC: 0.01 NG/ML (ref 0–0.03)
URN SPEC COLLECT METH UR: ABNORMAL
UROBILINOGEN UR STRIP-ACNC: NEGATIVE EU/DL
WBC # BLD AUTO: 9.71 K/UL (ref 3.9–12.7)
WBC #/AREA URNS HPF: 22 /HPF (ref 0–5)
YEAST URNS QL MICRO: ABNORMAL

## 2024-07-28 PROCEDURE — 94640 AIRWAY INHALATION TREATMENT: CPT

## 2024-07-28 PROCEDURE — 25000003 PHARM REV CODE 250: Performed by: EMERGENCY MEDICINE

## 2024-07-28 PROCEDURE — 27000221 HC OXYGEN, UP TO 24 HOURS

## 2024-07-28 PROCEDURE — 87040 BLOOD CULTURE FOR BACTERIA: CPT | Performed by: EMERGENCY MEDICINE

## 2024-07-28 PROCEDURE — 82803 BLOOD GASES ANY COMBINATION: CPT

## 2024-07-28 PROCEDURE — 63600175 PHARM REV CODE 636 W HCPCS: Performed by: STUDENT IN AN ORGANIZED HEALTH CARE EDUCATION/TRAINING PROGRAM

## 2024-07-28 PROCEDURE — 99900035 HC TECH TIME PER 15 MIN (STAT)

## 2024-07-28 PROCEDURE — 36415 COLL VENOUS BLD VENIPUNCTURE: CPT | Performed by: STUDENT IN AN ORGANIZED HEALTH CARE EDUCATION/TRAINING PROGRAM

## 2024-07-28 PROCEDURE — 96361 HYDRATE IV INFUSION ADD-ON: CPT

## 2024-07-28 PROCEDURE — 83735 ASSAY OF MAGNESIUM: CPT | Performed by: EMERGENCY MEDICINE

## 2024-07-28 PROCEDURE — 82800 BLOOD PH: CPT

## 2024-07-28 PROCEDURE — 87086 URINE CULTURE/COLONY COUNT: CPT | Performed by: EMERGENCY MEDICINE

## 2024-07-28 PROCEDURE — 25000242 PHARM REV CODE 250 ALT 637 W/ HCPCS: Performed by: STUDENT IN AN ORGANIZED HEALTH CARE EDUCATION/TRAINING PROGRAM

## 2024-07-28 PROCEDURE — 83880 ASSAY OF NATRIURETIC PEPTIDE: CPT | Performed by: EMERGENCY MEDICINE

## 2024-07-28 PROCEDURE — 96367 TX/PROPH/DG ADDL SEQ IV INF: CPT

## 2024-07-28 PROCEDURE — 84100 ASSAY OF PHOSPHORUS: CPT | Performed by: EMERGENCY MEDICINE

## 2024-07-28 PROCEDURE — 96374 THER/PROPH/DIAG INJ IV PUSH: CPT | Mod: 59

## 2024-07-28 PROCEDURE — 83605 ASSAY OF LACTIC ACID: CPT

## 2024-07-28 PROCEDURE — 83605 ASSAY OF LACTIC ACID: CPT | Performed by: EMERGENCY MEDICINE

## 2024-07-28 PROCEDURE — 93010 ELECTROCARDIOGRAM REPORT: CPT | Mod: ,,, | Performed by: INTERNAL MEDICINE

## 2024-07-28 PROCEDURE — 81000 URINALYSIS NONAUTO W/SCOPE: CPT | Mod: 59 | Performed by: EMERGENCY MEDICINE

## 2024-07-28 PROCEDURE — 96365 THER/PROPH/DIAG IV INF INIT: CPT

## 2024-07-28 PROCEDURE — 93005 ELECTROCARDIOGRAM TRACING: CPT

## 2024-07-28 PROCEDURE — 80307 DRUG TEST PRSMV CHEM ANLYZR: CPT | Performed by: STUDENT IN AN ORGANIZED HEALTH CARE EDUCATION/TRAINING PROGRAM

## 2024-07-28 PROCEDURE — 25000003 PHARM REV CODE 250: Performed by: STUDENT IN AN ORGANIZED HEALTH CARE EDUCATION/TRAINING PROGRAM

## 2024-07-28 PROCEDURE — 99223 1ST HOSP IP/OBS HIGH 75: CPT | Mod: GC,,, | Performed by: INTERNAL MEDICINE

## 2024-07-28 PROCEDURE — 82010 KETONE BODYS QUAN: CPT | Performed by: EMERGENCY MEDICINE

## 2024-07-28 PROCEDURE — 99291 CRITICAL CARE FIRST HOUR: CPT

## 2024-07-28 PROCEDURE — 36600 WITHDRAWAL OF ARTERIAL BLOOD: CPT

## 2024-07-28 PROCEDURE — 11000001 HC ACUTE MED/SURG PRIVATE ROOM

## 2024-07-28 PROCEDURE — 80053 COMPREHEN METABOLIC PANEL: CPT | Performed by: EMERGENCY MEDICINE

## 2024-07-28 PROCEDURE — 85025 COMPLETE CBC W/AUTO DIFF WBC: CPT | Performed by: EMERGENCY MEDICINE

## 2024-07-28 PROCEDURE — 27000190 HC CPAP FULL FACE MASK W/VALVE

## 2024-07-28 PROCEDURE — 94761 N-INVAS EAR/PLS OXIMETRY MLT: CPT | Mod: XB

## 2024-07-28 PROCEDURE — 63600175 PHARM REV CODE 636 W HCPCS: Performed by: EMERGENCY MEDICINE

## 2024-07-28 PROCEDURE — 25000242 PHARM REV CODE 250 ALT 637 W/ HCPCS: Performed by: EMERGENCY MEDICINE

## 2024-07-28 PROCEDURE — 80048 BASIC METABOLIC PNL TOTAL CA: CPT | Mod: XB | Performed by: STUDENT IN AN ORGANIZED HEALTH CARE EDUCATION/TRAINING PROGRAM

## 2024-07-28 PROCEDURE — 82962 GLUCOSE BLOOD TEST: CPT

## 2024-07-28 PROCEDURE — 84484 ASSAY OF TROPONIN QUANT: CPT | Performed by: EMERGENCY MEDICINE

## 2024-07-28 RX ORDER — IPRATROPIUM BROMIDE AND ALBUTEROL SULFATE 2.5; .5 MG/3ML; MG/3ML
3 SOLUTION RESPIRATORY (INHALATION)
Status: COMPLETED | OUTPATIENT
Start: 2024-07-28 | End: 2024-07-28

## 2024-07-28 RX ORDER — LOSARTAN POTASSIUM 25 MG/1
100 TABLET ORAL DAILY
Status: DISCONTINUED | OUTPATIENT
Start: 2024-07-28 | End: 2024-07-29 | Stop reason: HOSPADM

## 2024-07-28 RX ORDER — SODIUM CHLORIDE, SODIUM LACTATE, POTASSIUM CHLORIDE, CALCIUM CHLORIDE 600; 310; 30; 20 MG/100ML; MG/100ML; MG/100ML; MG/100ML
INJECTION, SOLUTION INTRAVENOUS CONTINUOUS
Status: DISCONTINUED | OUTPATIENT
Start: 2024-07-28 | End: 2024-07-28

## 2024-07-28 RX ORDER — ENOXAPARIN SODIUM 100 MG/ML
40 INJECTION SUBCUTANEOUS EVERY 24 HOURS
Status: DISCONTINUED | OUTPATIENT
Start: 2024-07-28 | End: 2024-07-29 | Stop reason: HOSPADM

## 2024-07-28 RX ORDER — INSULIN GLARGINE 100 [IU]/ML
20 INJECTION, SOLUTION SUBCUTANEOUS 2 TIMES DAILY
Status: DISCONTINUED | OUTPATIENT
Start: 2024-07-28 | End: 2024-07-29

## 2024-07-28 RX ORDER — INSULIN ASPART 100 [IU]/ML
10 INJECTION, SOLUTION INTRAVENOUS; SUBCUTANEOUS
Status: DISCONTINUED | OUTPATIENT
Start: 2024-07-28 | End: 2024-07-29 | Stop reason: HOSPADM

## 2024-07-28 RX ORDER — INSULIN GLARGINE 100 [IU]/ML
15 INJECTION, SOLUTION SUBCUTANEOUS 2 TIMES DAILY
Status: DISCONTINUED | OUTPATIENT
Start: 2024-07-28 | End: 2024-07-28

## 2024-07-28 RX ORDER — INSULIN GLARGINE 100 [IU]/ML
12 INJECTION, SOLUTION SUBCUTANEOUS DAILY
Status: DISCONTINUED | OUTPATIENT
Start: 2024-07-28 | End: 2024-07-28

## 2024-07-28 RX ORDER — NAPROXEN SODIUM 220 MG/1
81 TABLET, FILM COATED ORAL DAILY
Status: DISCONTINUED | OUTPATIENT
Start: 2024-07-28 | End: 2024-07-29 | Stop reason: HOSPADM

## 2024-07-28 RX ORDER — LORAZEPAM 0.5 MG/1
2 TABLET ORAL EVERY 4 HOURS PRN
Status: DISCONTINUED | OUTPATIENT
Start: 2024-07-28 | End: 2024-07-29 | Stop reason: HOSPADM

## 2024-07-28 RX ORDER — HYDROCODONE BITARTRATE AND ACETAMINOPHEN 5; 325 MG/1; MG/1
1 TABLET ORAL EVERY 6 HOURS PRN
Status: DISCONTINUED | OUTPATIENT
Start: 2024-07-28 | End: 2024-07-29 | Stop reason: HOSPADM

## 2024-07-28 RX ORDER — GLUCAGON 1 MG
1 KIT INJECTION
Status: DISCONTINUED | OUTPATIENT
Start: 2024-07-28 | End: 2024-07-29 | Stop reason: HOSPADM

## 2024-07-28 RX ORDER — IBUPROFEN 200 MG
24 TABLET ORAL
Status: DISCONTINUED | OUTPATIENT
Start: 2024-07-28 | End: 2024-07-29 | Stop reason: HOSPADM

## 2024-07-28 RX ORDER — ACETAMINOPHEN 325 MG/1
650 TABLET ORAL EVERY 4 HOURS PRN
Status: DISCONTINUED | OUTPATIENT
Start: 2024-07-28 | End: 2024-07-29 | Stop reason: HOSPADM

## 2024-07-28 RX ORDER — IPRATROPIUM BROMIDE AND ALBUTEROL SULFATE 2.5; .5 MG/3ML; MG/3ML
3 SOLUTION RESPIRATORY (INHALATION)
Status: DISCONTINUED | OUTPATIENT
Start: 2024-07-28 | End: 2024-07-29 | Stop reason: HOSPADM

## 2024-07-28 RX ORDER — FOLIC ACID 1 MG/1
1 TABLET ORAL DAILY
Status: DISCONTINUED | OUTPATIENT
Start: 2024-07-28 | End: 2024-07-29 | Stop reason: HOSPADM

## 2024-07-28 RX ORDER — VENLAFAXINE HYDROCHLORIDE 75 MG/1
75 CAPSULE, EXTENDED RELEASE ORAL DAILY
Status: DISCONTINUED | OUTPATIENT
Start: 2024-07-28 | End: 2024-07-29 | Stop reason: HOSPADM

## 2024-07-28 RX ORDER — ONDANSETRON 8 MG/1
8 TABLET, ORALLY DISINTEGRATING ORAL EVERY 8 HOURS PRN
Status: DISCONTINUED | OUTPATIENT
Start: 2024-07-28 | End: 2024-07-29 | Stop reason: HOSPADM

## 2024-07-28 RX ORDER — POLYETHYLENE GLYCOL 3350 17 G/17G
17 POWDER, FOR SOLUTION ORAL DAILY
Status: DISCONTINUED | OUTPATIENT
Start: 2024-07-28 | End: 2024-07-29 | Stop reason: HOSPADM

## 2024-07-28 RX ORDER — INSULIN ASPART 100 [IU]/ML
0-10 INJECTION, SOLUTION INTRAVENOUS; SUBCUTANEOUS
Status: DISCONTINUED | OUTPATIENT
Start: 2024-07-28 | End: 2024-07-29 | Stop reason: HOSPADM

## 2024-07-28 RX ORDER — LANOLIN ALCOHOL/MO/W.PET/CERES
100 CREAM (GRAM) TOPICAL DAILY
Status: DISCONTINUED | OUTPATIENT
Start: 2024-07-28 | End: 2024-07-29 | Stop reason: HOSPADM

## 2024-07-28 RX ORDER — INSULIN ASPART 100 [IU]/ML
5 INJECTION, SOLUTION INTRAVENOUS; SUBCUTANEOUS
Status: DISCONTINUED | OUTPATIENT
Start: 2024-07-28 | End: 2024-07-28

## 2024-07-28 RX ORDER — MUPIROCIN 20 MG/G
OINTMENT TOPICAL 2 TIMES DAILY
Status: DISCONTINUED | OUTPATIENT
Start: 2024-07-28 | End: 2024-07-29 | Stop reason: HOSPADM

## 2024-07-28 RX ORDER — INSULIN GLARGINE 100 [IU]/ML
12 INJECTION, SOLUTION SUBCUTANEOUS 2 TIMES DAILY
Status: DISCONTINUED | OUTPATIENT
Start: 2024-07-28 | End: 2024-07-28

## 2024-07-28 RX ORDER — PREDNISONE 20 MG/1
40 TABLET ORAL DAILY
Status: DISCONTINUED | OUTPATIENT
Start: 2024-07-28 | End: 2024-07-29 | Stop reason: HOSPADM

## 2024-07-28 RX ORDER — ACETAMINOPHEN 325 MG/1
650 TABLET ORAL EVERY 8 HOURS PRN
Status: DISCONTINUED | OUTPATIENT
Start: 2024-07-28 | End: 2024-07-29 | Stop reason: HOSPADM

## 2024-07-28 RX ORDER — CARBAMAZEPINE 200 MG/1
400 TABLET ORAL NIGHTLY
Status: DISCONTINUED | OUTPATIENT
Start: 2024-07-28 | End: 2024-07-29 | Stop reason: HOSPADM

## 2024-07-28 RX ORDER — ATORVASTATIN CALCIUM 10 MG/1
20 TABLET, FILM COATED ORAL DAILY
Status: DISCONTINUED | OUTPATIENT
Start: 2024-07-28 | End: 2024-07-29 | Stop reason: HOSPADM

## 2024-07-28 RX ORDER — IBUPROFEN 200 MG
16 TABLET ORAL
Status: DISCONTINUED | OUTPATIENT
Start: 2024-07-28 | End: 2024-07-29 | Stop reason: HOSPADM

## 2024-07-28 RX ORDER — NIFEDIPINE 30 MG/1
60 TABLET, EXTENDED RELEASE ORAL DAILY
Status: DISCONTINUED | OUTPATIENT
Start: 2024-07-28 | End: 2024-07-29 | Stop reason: HOSPADM

## 2024-07-28 RX ORDER — GUAIFENESIN 100 MG/5ML
200 SOLUTION ORAL EVERY 4 HOURS PRN
Status: DISCONTINUED | OUTPATIENT
Start: 2024-07-28 | End: 2024-07-29 | Stop reason: HOSPADM

## 2024-07-28 RX ORDER — SODIUM CHLORIDE 0.9 % (FLUSH) 0.9 %
3 SYRINGE (ML) INJECTION
Status: DISCONTINUED | OUTPATIENT
Start: 2024-07-28 | End: 2024-07-29 | Stop reason: HOSPADM

## 2024-07-28 RX ORDER — AZITHROMYCIN 250 MG/1
500 TABLET, FILM COATED ORAL EVERY 24 HOURS
Status: DISCONTINUED | OUTPATIENT
Start: 2024-07-29 | End: 2024-07-29 | Stop reason: HOSPADM

## 2024-07-28 RX ORDER — TALC
6 POWDER (GRAM) TOPICAL NIGHTLY PRN
Status: DISCONTINUED | OUTPATIENT
Start: 2024-07-28 | End: 2024-07-29 | Stop reason: HOSPADM

## 2024-07-28 RX ADMIN — INSULIN ASPART 10 UNITS: 100 INJECTION, SOLUTION INTRAVENOUS; SUBCUTANEOUS at 11:07

## 2024-07-28 RX ADMIN — THERA TABS 1 TABLET: TAB at 08:07

## 2024-07-28 RX ADMIN — GUAIFENESIN 200 MG: 200 SOLUTION ORAL at 09:07

## 2024-07-28 RX ADMIN — INSULIN HUMAN 5 UNITS: 100 INJECTION, SOLUTION PARENTERAL at 04:07

## 2024-07-28 RX ADMIN — POLYETHYLENE GLYCOL 3350 17 G: 17 POWDER, FOR SOLUTION ORAL at 08:07

## 2024-07-28 RX ADMIN — HYDROCODONE BITARTRATE AND ACETAMINOPHEN 1 TABLET: 5; 325 TABLET ORAL at 04:07

## 2024-07-28 RX ADMIN — NIFEDIPINE 60 MG: 30 TABLET, FILM COATED, EXTENDED RELEASE ORAL at 08:07

## 2024-07-28 RX ADMIN — INSULIN ASPART 3 UNITS: 100 INJECTION, SOLUTION INTRAVENOUS; SUBCUTANEOUS at 09:07

## 2024-07-28 RX ADMIN — IPRATROPIUM BROMIDE AND ALBUTEROL SULFATE 3 ML: 2.5; .5 SOLUTION RESPIRATORY (INHALATION) at 08:07

## 2024-07-28 RX ADMIN — INSULIN GLARGINE 12 UNITS: 100 INJECTION, SOLUTION SUBCUTANEOUS at 08:07

## 2024-07-28 RX ADMIN — THIAMINE HCL TAB 100 MG 100 MG: 100 TAB at 08:07

## 2024-07-28 RX ADMIN — INSULIN ASPART 5 UNITS: 100 INJECTION, SOLUTION INTRAVENOUS; SUBCUTANEOUS at 08:07

## 2024-07-28 RX ADMIN — INSULIN ASPART 5 UNITS: 100 INJECTION, SOLUTION INTRAVENOUS; SUBCUTANEOUS at 11:07

## 2024-07-28 RX ADMIN — AZITHROMYCIN MONOHYDRATE 500 MG: 500 INJECTION, POWDER, LYOPHILIZED, FOR SOLUTION INTRAVENOUS at 03:07

## 2024-07-28 RX ADMIN — ATORVASTATIN CALCIUM 20 MG: 10 TABLET, FILM COATED ORAL at 08:07

## 2024-07-28 RX ADMIN — ASPIRIN 81 MG CHEWABLE TABLET 81 MG: 81 TABLET CHEWABLE at 08:07

## 2024-07-28 RX ADMIN — SODIUM CHLORIDE 1000 ML: 9 INJECTION, SOLUTION INTRAVENOUS at 03:07

## 2024-07-28 RX ADMIN — ENOXAPARIN SODIUM 40 MG: 40 INJECTION SUBCUTANEOUS at 04:07

## 2024-07-28 RX ADMIN — SODIUM CHLORIDE, POTASSIUM CHLORIDE, SODIUM LACTATE AND CALCIUM CHLORIDE: 600; 310; 30; 20 INJECTION, SOLUTION INTRAVENOUS at 07:07

## 2024-07-28 RX ADMIN — INSULIN HUMAN 7 UNITS: 100 INJECTION, SOLUTION PARENTERAL at 04:07

## 2024-07-28 RX ADMIN — FOLIC ACID 1 MG: 1 TABLET ORAL at 08:07

## 2024-07-28 RX ADMIN — INSULIN GLARGINE 20 UNITS: 100 INJECTION, SOLUTION SUBCUTANEOUS at 09:07

## 2024-07-28 RX ADMIN — MUPIROCIN: 20 OINTMENT TOPICAL at 09:07

## 2024-07-28 RX ADMIN — IPRATROPIUM BROMIDE AND ALBUTEROL SULFATE 3 ML: 2.5; .5 SOLUTION RESPIRATORY (INHALATION) at 04:07

## 2024-07-28 RX ADMIN — CEFTRIAXONE SODIUM 2 G: 2 INJECTION, POWDER, FOR SOLUTION INTRAMUSCULAR; INTRAVENOUS at 03:07

## 2024-07-28 RX ADMIN — IPRATROPIUM BROMIDE AND ALBUTEROL SULFATE 3 ML: 2.5; .5 SOLUTION RESPIRATORY (INHALATION) at 01:07

## 2024-07-28 RX ADMIN — IPRATROPIUM BROMIDE AND ALBUTEROL SULFATE 3 ML: 2.5; .5 SOLUTION RESPIRATORY (INHALATION) at 07:07

## 2024-07-28 RX ADMIN — INSULIN ASPART 10 UNITS: 100 INJECTION, SOLUTION INTRAVENOUS; SUBCUTANEOUS at 04:07

## 2024-07-28 RX ADMIN — VENLAFAXINE HYDROCHLORIDE 75 MG: 75 CAPSULE, EXTENDED RELEASE ORAL at 08:07

## 2024-07-28 RX ADMIN — LOSARTAN POTASSIUM 100 MG: 25 TABLET, FILM COATED ORAL at 08:07

## 2024-07-28 RX ADMIN — Medication 6 MG: at 09:07

## 2024-07-28 RX ADMIN — PREDNISONE 40 MG: 20 TABLET ORAL at 08:07

## 2024-07-28 RX ADMIN — ACETAMINOPHEN 650 MG: 325 TABLET ORAL at 02:07

## 2024-07-28 RX ADMIN — CARBAMAZEPINE 400 MG: 200 TABLET ORAL at 09:07

## 2024-07-28 NOTE — ASSESSMENT & PLAN NOTE
Patient's COPD is with exacerbation noted by use of accessory muscles for breathing and worsening of baseline hypoxia currently.  Patient is currently on COPD Pathway. Continue scheduled inhalers Steroids, Antibiotics, and Supplemental oxygen and monitor respiratory status closely.   If worsening respiratory status on current regimen would work up for DKA as will be unable to compensate for metabolic acidosis with active COPD exacerbation.

## 2024-07-28 NOTE — NURSING
[facility]  Nurses Note -- 4 Eyes      7/28/2024       Skin assessed on: Q Shift      [x] No Pressure Injuries Present    []Prevention Measures Documented    [] Yes LDA  for Pressure Injury Previously documented     [] Yes New Pressure Injury Discovered   [] LDA for New Pressure Injury Added      Attending RN:  Belinda Gonsalez RN     Second RN:  RITA Gallardo

## 2024-07-28 NOTE — HPI
Pt is a 57 yo CW [facility] COPD who states he has had worsening shortness of breath over the past 2-3 days. He has had increased use of nebulizer. States he does not use a maintenance inhaler, despite Anoro Ellipta on MAR. Unable to reach mother (straight to full mailbox) or sister cell (could not be reached at this time message) to confirm. Pt was recently seen virtually by primary care who started on azithro, but avoided steroids due to labile and poorly controlled DM1. Pt denies cough with production, fevers, sore throat. Had some URI symptoms. Pt was found to be hypoxic to 73% and placed on CPAP with EMS. Pt had associated metabolic without respiratory compensation due to COPD exacerbation. Improved and transitioned to NC. Pt started on azithromycin and steroid. Noted to be hyperglycemic.

## 2024-07-28 NOTE — SUBJECTIVE & OBJECTIVE
"Past Medical History:   Diagnosis Date    Abnormal EEG 2024    3/16/2024 "Impression:   This is an abnormal awake and sleep vEEG consistent with a moderate diffuse encephalopathy and multifocal regions of cortical irritability that is most prominent in the right > left hemisphere.  No clinical events or electrographic seizures are recorded.  "    Acute on chronic pancreatitis 2016    Bacterial pneumonia 2020    Cavitary lung lesions  Parapneumonic effusion/ EMPYEMA    CHF (congestive heart failure)     Closed compression fracture of body of L1 vertebra 2020    COPD (chronic obstructive pulmonary disease) 2020    DM (diabetes mellitus), type 1     c/b DKA    HCV (hepatitis C virus)     2023 count < 12    HTN (hypertension)     Hx of psychiatric care     Stroke     R sided deficits    Substance abuse     IVDU heroin, methamphetamines       Past Surgical History:   Procedure Laterality Date    CARDIAC SURGERY      stent placed. ([facility])    ESOPHAGOGASTRODUODENOSCOPY N/A 3/5/2020    Gastritis.  No H pylori.  Completed PPI for 1 month.  Procedure: EGD (ESOPHAGOGASTRODUODENOSCOPY);  Surgeon: Brinda Rene MD;  Location: [facility] ENDO;  Service: Endoscopy;  Laterality: N/A;  [id number]; [phone]    LEFT HEART CATHETERIZATION Left 2020    Procedure: Left heart cath;  Surgeon: Fito Rangel MD;  Location: [facility] CATH LAB;  Service: Cardiology;  Laterality: Left;    SHOULDER SURGERY      right shoulder, spur removal.       Review of patient's allergies indicates:  No Known Allergies    Family History       Problem Relation (Age of Onset)    Asthma Mother          Tobacco Use    Smoking status: Former     Current packs/day: 0.00     Average packs/day: 1 pack/day for 39.2 years (39.2 ttl pk-yrs)     Types: Cigarettes     Start date: 4/3/1981     Quit date: 2020     Years since quittin.1    Smokeless tobacco: Never    Tobacco comments:     states approx 1 per day. "   Substance and Sexual Activity    Alcohol use: Not Currently     Alcohol/week: 0.0 standard drinks of alcohol    Drug use: Yes     Types: Heroin, IV, Marijuana, Amphetamines     Comment: chronic methamphetamine user    Sexual activity: Yes     Partners: Female         Review of Systems   Constitutional:  Positive for activity change and fatigue. Negative for chills, diaphoresis and fever.   HENT:  Positive for congestion. Negative for ear pain, postnasal drip, sneezing and sore throat.    Respiratory:  Positive for shortness of breath and wheezing. Negative for cough and chest tightness.    Cardiovascular:  Negative for chest pain, palpitations and leg swelling.   Gastrointestinal:  Negative for abdominal distention, diarrhea and nausea.   Musculoskeletal:  Negative for arthralgias and myalgias.   Neurological:  Negative for dizziness, seizures, syncope and light-headedness.   Psychiatric/Behavioral:  Negative for agitation, behavioral problems and confusion.      Objective:     Vital Signs (Most Recent):  Temp: 98 °F (36.7 °C) (07/28/24 1110)  Pulse: 91 (07/28/24 1110)  Resp: 18 (07/28/24 1110)  BP: 122/70 (07/28/24 1110)  SpO2: 99 % (07/28/24 1110) Vital Signs (24h Range):  Temp:  [97.4 °F (36.3 °C)-98 °F (36.7 °C)] 98 °F (36.7 °C)  Pulse:  [] 91  Resp:  [17-36] 18  SpO2:  [93 %-100 %] 99 %  BP: (109-140)/(63-90) 122/70     Weight: 63.5 kg (139 lb 15.9 oz)  Body mass index is 18.99 kg/m².      Intake/Output Summary (Last 24 hours) at 7/28/2024 1300  Last data filed at 7/28/2024 0449  Gross per 24 hour   Intake 1349 ml   Output --   Net 1349 ml        Physical Exam  Constitutional:       Appearance: He is not ill-appearing or toxic-appearing.   Cardiovascular:      Heart sounds: No murmur heard.     No friction rub. No gallop.   Pulmonary:      Breath sounds: Wheezing and rales (right lower lung fields.) present. No rhonchi.      Comments: Prolonged expiratory phase with expiratory wheezes.   Skin:      Capillary Refill: Capillary refill takes less than 2 seconds.   Neurological:      General: No focal deficit present.      Mental Status: He is alert and oriented to person, place, and time.      Motor: No weakness.   Psychiatric:         Thought Content: Thought content normal.      Comments: Flat affect.           Vents:  Oxygen Concentration (%): 50 (07/28/24 0254)    Lines/Drains/Airways       Drain  Duration             Male External Urinary Catheter 07/28/24 0330 Medium <1 day              Peripheral Intravenous Line  Duration                  Peripheral IV - Single Lumen 07/28/24 0246 22 G 3/4 in Anterior;Distal;Left Forearm <1 day         Peripheral IV - Single Lumen 07/28/24 0256 20 G Anterior;Left Forearm <1 day                    Significant Labs:    CBC/Anemia Profile:  Recent Labs   Lab 07/28/24 0253   WBC 9.71   HGB 13.0*   HCT 43.0      MCV 80*   RDW 14.5        Chemistries:  Recent Labs   Lab 07/28/24 0253   *   K 4.5   CL 99   CO2 22*   BUN 12   CREATININE 1.2   CALCIUM 9.3   ALBUMIN 3.6   PROT 8.0   BILITOT 0.3   ALKPHOS 103   ALT 13   AST 15   MG 3.7*   PHOS 3.3       All pertinent labs within the past 24 hours have been reviewed.    Significant Imaging:   I have reviewed all pertinent imaging results/findings within the past 24 hours.

## 2024-07-28 NOTE — ASSESSMENT & PLAN NOTE
Patient's FSGs are uncontrolled due to hyperglycemia on current medication regimen.  Last A1c reviewed-   Lab Results   Component Value Date    HGBA1C 9.2 (H) 07/16/2024     Most recent fingerstick glucose reviewed-   Recent Labs   Lab 07/28/24  0600 07/28/24  0835 07/28/24  0837 07/28/24  1157   POCTGLUCOSE 423* 427* 459* 485*     Current correctional scale  High  worsening. Will adjust treatment as follows: per primary team  anti-hyperglycemic dose as follows-   Antihyperglycemics (From admission, onward)      Start     Stop Route Frequency Ordered    07/28/24 2100  insulin glargine U-100 (Lantus) pen 15 Units         -- SubQ 2 times daily 07/28/24 1221    07/28/24 1645  insulin aspart U-100 pen 10 Units         -- SubQ 3 times daily with meals 07/28/24 1221    07/28/24 0649  insulin aspart U-100 pen 0-10 Units         -- SubQ Before meals & nightly PRN 07/28/24 0550          Hold Oral hypoglycemics while patient is in the hospital.

## 2024-07-28 NOTE — ASSESSMENT & PLAN NOTE
History of stroke   With mild residual right-sided weakness   Continue secondary prevention of stroke  Aspirin, atorvastatin.

## 2024-07-28 NOTE — ASSESSMENT & PLAN NOTE
Stable 5 mm pulmonary nodule. No further work up from that stand point, however would potentially benefit from outpatient LDCT scan after conversation about potential repeat imaging, biopsy and treatment if lung cancer discovered.

## 2024-07-28 NOTE — H&P
[facility] Medicine  History & Physical    Patient Name: Cali Mabry  MRN: [id number]  Patient Class: IP- Inpatient  Admission Date: 7/28/2024  Attending Physician: Darren Gorman MD   Primary Care Provider: Lawanda Flores NP         Patient information was obtained from patient, EMS personnel, past medical records, and ER records.     Subjective:     Principal Problem:COPD exacerbation    Chief Complaint:   Chief Complaint   Patient presents with    Shortness of Breath     2-3 days SOB worsening tonight with accessory muscle use and O2 sat 71% when EMS arrived. Patient arrived on Cpap. Received 125 solumedrol, 1 duoneb, 2g Mg IV, 0.3 epi IM in route. Pt reports having a previous respiratory illness and is taking azithromycin at home.         HPI: 58-year-old male with medical history significant for cerebrovascular accident with mild residual right-sided weakness, chronic obstructive pulmonary disease, type 1 diabetes mellitus, hepatitis-C infection, hypertension, polysubstance abuse- methamphetamine, heroin and tobacco use disorder with 40 pack-year cigarette smoking who was brought to the emergency department by EMS after being called for worsening shortness a breath, per EMS he was saturating 71% on room air upon arrival.  For which he was started on CPAP, given 125 mg of Solu-Medrol, nebulized with DuoNebs, 2 mg of magnesium sulfate and 0.3 epi enroute emergency room.  He came in on CPAP, with VBG obtained showing pH of 7.26, pCO2 of 69.8, 0 2 of 27, with bicarb of 30.3, he could had with of the who wheezing, with use of accessory muscle of respiration, voiced associated generalized weakness.  He denied fever, chills or rigors, no chest pain, palpitation, orthopnea or paroxysmal nocturnal dyspnea.  He also denied urinary symptoms of dysuria, frequency, urgency, straining at micturition or hematuria.  He has not had any nausea, vomiting, change in bowel habits.  Denied  "recent fall, no headache, or lightheadedness  He currently lives with his mom.  Lab obtained in the emergency room showed H&H of 13/43, with WBC of 9.71.  Random blood glucose elevated at 458, BUN/creatinine of 12/1.2, serum bicarbonate of 22, anion gap of 14.  Troponin within normal limits at 0.008, BNP was 30.  Chest x-ray obtained showed " suspected mild infrahilar and basilar interstitial infiltrates with superimposed suspected mild alveolar infiltrate at the right lung base, with minimal pleural fluid on the right".  Was started on ceftriaxone and azithromycin in the emergency room.    Past Medical History:   Diagnosis Date    Abnormal EEG 03/16/2024    3/16/2024 "Impression:   This is an abnormal awake and sleep vEEG consistent with a moderate diffuse encephalopathy and multifocal regions of cortical irritability that is most prominent in the right > left hemisphere.  No clinical events or electrographic seizures are recorded.  "    Acute on chronic pancreatitis 04/03/2016    Bacterial pneumonia 02/29/2020    Cavitary lung lesions  Parapneumonic effusion/ EMPYEMA    CHF (congestive heart failure)     Closed compression fracture of body of L1 vertebra 05/09/2020    COPD (chronic obstructive pulmonary disease) 08/08/2020    DM (diabetes mellitus), type 1     c/b DKA    HCV (hepatitis C virus)     08/2023 count < 12    HTN (hypertension)     Hx of psychiatric care     Stroke     R sided deficits    Substance abuse     IVDU heroin, methamphetamines       Past Surgical History:   Procedure Laterality Date    CARDIAC SURGERY  1999    stent placed. ([facility])    ESOPHAGOGASTRODUODENOSCOPY N/A 3/5/2020    Gastritis.  No H pylori.  Completed PPI for 1 month.  Procedure: EGD (ESOPHAGOGASTRODUODENOSCOPY);  Surgeon: Brinda Rene MD;  Location: [facility];  Service: Endoscopy;  Laterality: N/A;  [id number]; [phone]    LEFT HEART CATHETERIZATION Left 9/28/2020    Procedure: Left heart cath;  Surgeon: Fito ARMAS " "MD Claire;  Location: [facility] CATH LAB;  Service: Cardiology;  Laterality: Left;    SHOULDER SURGERY  2013    right shoulder, spur removal.       Review of patient's allergies indicates:  No Known Allergies    No current facility-administered medications on file prior to encounter.     Current Outpatient Medications on File Prior to Encounter   Medication Sig    albuterol (PROVENTIL/VENTOLIN HFA) 90 mcg/actuation inhaler Inhale 1-2 puffs into the lungs every 6 (six) hours as needed for Wheezing. Rescue    aspirin 81 MG Chew Take 1 tablet (81 mg total) by mouth once daily.    atorvastatin (LIPITOR) 20 MG tablet Take 1 tablet (20 mg total) by mouth once daily.    BD GERTRUDIS 2ND GEN PEN NEEDLE 32 gauge x 5/32" Ndle Inject into the skin 4 (four) times daily.    carBAMazepine (TEGRETOL XR) 200 MG 12 hr tablet Take 1 tablet (200 mg total) by mouth once daily.    carBAMazepine (TEGRETOL) 200 mg tablet Take 2 tablets (400 mg total) by mouth every evening.    folic acid (FOLVITE) 1 MG tablet Take 1 tablet (1 mg total) by mouth once daily.    FREESTYLE SEBASTIAN 2 SENSOR Kit USE FOUR TIMES PER WEEK AS DIRECTED    insulin detemir U-100, Levemir, 100 unit/mL (3 mL) SubQ InPn pen Inject 10 Units into the skin 2 (two) times daily. Inject 10 units under the skin in the morning and 10 units in the evening.    insulin lispro 100 unit/mL pen Inject 5 Units into the skin 3 (three) times daily before meals.    levocetirizine (XYZAL) 5 MG tablet Take 5 mg by mouth every evening.    losartan (COZAAR) 100 MG tablet Take 1 tablet (100 mg total) by mouth once daily.    naloxone (NARCAN) 4 mg/actuation Spry 4mg by nasal route as needed for opioid overdose; may repeat every 2-3 minutes in alternating nostrils until medical help arrives. Call 911    NIFEdipine (PROCARDIA-XL) 60 MG (OSM) 24 hr tablet Take 1 tablet (60 mg total) by mouth once daily.    thiamine 100 MG tablet Take 1 tablet (100 mg total) by mouth once daily.    venlafaxine (EFFEXOR-XR) 75 " MG 24 hr capsule Take 1 capsule (75 mg total) by mouth once daily.    [DISCONTINUED] insulin aspart U-100 (NOVOLOG) 100 unit/mL injection Inject 1-10 Units into the skin 3 (three) times daily with meals.     Family History       Problem Relation (Age of Onset)    Asthma Mother          Tobacco Use    Smoking status: Former     Current packs/day: 0.00     Average packs/day: 1 pack/day for 39.2 years (39.2 ttl pk-yrs)     Types: Cigarettes     Start date: 4/3/1981     Quit date: 2020     Years since quittin.1    Smokeless tobacco: Never    Tobacco comments:     states approx 1 per day.   Substance and Sexual Activity    Alcohol use: Not Currently     Alcohol/week: 0.0 standard drinks of alcohol    Drug use: Yes     Types: Heroin, IV, Marijuana, Amphetamines     Comment: chronic methamphetamine user    Sexual activity: Yes     Partners: Female     Review of Systems   Constitutional:  Negative for appetite change, chills, diaphoresis and fatigue.   HENT:  Negative for congestion, sore throat and tinnitus.    Eyes:  Negative for pain, discharge and itching.   Respiratory:  Positive for shortness of breath and wheezing. Negative for cough and chest tightness.    Cardiovascular:  Negative for chest pain, palpitations and leg swelling.   Gastrointestinal:  Negative for abdominal distention, abdominal pain, blood in stool, nausea and vomiting.   Endocrine: Negative for cold intolerance, heat intolerance and polydipsia.   Genitourinary:  Negative for difficulty urinating, dysuria, flank pain, frequency and hematuria.   Musculoskeletal:  Negative for arthralgias and back pain.   Skin:  Negative for color change, pallor, rash and wound.   Neurological:  Negative for dizziness, seizures, facial asymmetry, light-headedness, numbness and headaches.   Psychiatric/Behavioral:  Negative for agitation, confusion and hallucinations.      Objective:     Vital Signs (Most Recent):  Temp: 98 °F (36.7 °C) (24 0546)  Pulse:  101 (07/28/24 0546)  Resp: 20 (07/28/24 0546)  BP: 115/66 (07/28/24 0532)  SpO2: 99 % (07/28/24 0546) Vital Signs (24h Range):  Temp:  [97.4 °F (36.3 °C)-98 °F (36.7 °C)] 98 °F (36.7 °C)  Pulse:  [] 101  Resp:  [20-36] 20  SpO2:  [93 %-100 %] 99 %  BP: (109-140)/(63-90) 115/66     Weight: 63.5 kg (140 lb)  Body mass index is 18.99 kg/m².     Physical Exam  Vitals reviewed.   Constitutional:       General: He is in acute distress.      Appearance: He is ill-appearing.   HENT:      Head: Normocephalic and atraumatic.      Nose: Nose normal. No congestion or rhinorrhea.      Mouth/Throat:      Mouth: Mucous membranes are dry.   Eyes:      General: No scleral icterus.     Extraocular Movements: Extraocular movements intact.      Conjunctiva/sclera: Conjunctivae normal.      Pupils: Pupils are equal, round, and reactive to light.   Cardiovascular:      Rate and Rhythm: Regular rhythm. Tachycardia present.      Pulses: Normal pulses.      Heart sounds: Normal heart sounds. No murmur heard.  Pulmonary:      Effort: Respiratory distress present.      Breath sounds: Wheezing and rales present. No rhonchi.   Chest:      Chest wall: No tenderness.   Abdominal:      General: Abdomen is flat. Bowel sounds are normal. There is no distension.      Palpations: Abdomen is soft.      Tenderness: There is no abdominal tenderness. There is no right CVA tenderness, left CVA tenderness, guarding or rebound.   Musculoskeletal:         General: No swelling. Normal range of motion.      Cervical back: Normal range of motion. No rigidity or tenderness.      Right lower leg: No edema.      Left lower leg: No edema.   Skin:     General: Skin is warm and dry.      Coloration: Skin is not jaundiced.      Findings: No bruising or erythema.   Neurological:      General: No focal deficit present.      Mental Status: He is alert and oriented to person, place, and time.      Cranial Nerves: No cranial nerve deficit.      Sensory: No sensory  deficit.   Psychiatric:         Mood and Affect: Mood normal.         Behavior: Behavior normal.         Thought Content: Thought content normal.              CRANIAL NERVES     CN III, IV, VI   Pupils are equal, round, and reactive to light.       Significant Labs: All pertinent labs within the past 24 hours have been reviewed.  ABGs:   Recent Labs   Lab 07/28/24  0254 07/28/24  0432 07/28/24  0556   PH 7.243* 7.164* 7.411   PCO2 69.8* 43.9 38.0   HCO3 30.2* 15.8* 24.2   POCSATURATED 39 41 92   BE 1 -12* 0   PO2 27* 29* 62*     Bilirubin:   Recent Labs   Lab 07/28/24 0253   BILITOT 0.3     CBC:   Recent Labs   Lab 07/28/24 0253   WBC 9.71   HGB 13.0*   HCT 43.0        CMP:   Recent Labs   Lab 07/28/24 0253   *   K 4.5   CL 99   CO2 22*   *   BUN 12   CREATININE 1.2   CALCIUM 9.3   PROT 8.0   ALBUMIN 3.6   BILITOT 0.3   ALKPHOS 103   AST 15   ALT 13   ANIONGAP 14     Cardiac Markers:   Recent Labs   Lab 07/28/24 0253   BNP 30     Troponin:   Recent Labs   Lab 07/28/24 0253   TROPONINI 0.008     Significant Imaging: I have reviewed all pertinent imaging results/findings within the past 24 hours.  Imaging Results              X-Ray Chest AP Portable (Final result)  Result time 07/28/24 03:22:25      Final result by Khris Anna MD (07/28/24 03:22:25)                   Impression:      Suspected mild infrahilar and basilar interstitial infiltrates with superimposed suspected mild alveolar infiltrate at the right lung base, with minimal pleural fluid on the right.      Electronically signed by: Khris Anna  Date:    07/28/2024  Time:    03:22               Narrative:    EXAMINATION:  XR CHEST AP PORTABLE    CLINICAL HISTORY:  Sepsis;    TECHNIQUE:  Single frontal view of the chest was performed.    COMPARISON:  Chest radiograph March 4, 2024    FINDINGS:  Single portable chest view is submitted.  The cardiomediastinal silhouette appears appropriate.    Minimal linear density at the  left lower chest may relate to mild atelectasis.  There is mild accentuation of the interstitial markings about the infrahilar region and lung bases that may relate to mild interstitial infiltrate, mild hazy opacity at the right lung base medially may relate to mild component of alveolar infiltrate.  There is also minimal blunting at the right costophrenic angle, this may relate to a small amount of pleural fluid.    There is no large pleural effusion and there is no pneumothorax.  The osseous structures appear intact.                                      Assessment/Plan:     * COPD exacerbation  Patient's COPD is with exacerbation noted by continued dyspnea, use of accessory muscles for breathing, and worsening of baseline hypoxia currently.    He has greater than 40 pack-year cigarette smoking  Patient is currently on COPD Pathway. Continue scheduled inhalers Steroids, Antibiotics, and Supplemental oxygen and monitor respiratory status closely.   Consider BiPAP if patient becomes hypercapnic    Congestive heart failure  Patient is identified as having Diastolic (HFpEF) heart failure that is Chronic. CHF is currently controlled.   He does not appear to be in exacerbation at this time, BNP 30.  Shortness a breath likely due to COPD exacerbation  Latest ECHO performed and demonstrates- Results for orders placed during the hospital encounter of 08/15/23    Echo Saline Bubble? Yes    Interpretation Summary    Left Ventricle: The left ventricle is normal in size. Normal wall thickness. There is concentric remodeling. Normal wall motion. There is normal systolic function with a visually estimated ejection fraction of 55 - 60%. There is diastolic dysfunction.    Right Ventricle: Normal right ventricular cavity size. Wall thickness is normal. Right ventricle wall motion  is normal. Systolic function is normal.    Left Atrium: Agitated saline study of the atrial septum is negative, suggesting absence of intracardiac shunt  at the atrial level.    No intracardiac source of embolus noted on this exam.  If high clinical suspicion, consider FREYA +/- bubble study.  . Continue Beta Blocker and ACE/ARB and monitor clinical status closely. Monitor on telemetry. Patient is off CHF pathway.  Monitor strict Is&Os and daily weights.  Place on fluid restriction of 2 L. Cardiology has not been consulted. Continue to stress to patient importance of self efficacy and  on diet for CHF. Last BNP reviewed- and noted below   Recent Labs   Lab 07/28/24  0253   BNP 30       Hepatitis C infection  History of hepatitis-C infection   Likely chronic infection, he could not tell me if he has been treated in the past  Follow-up with GI/Infectious Disease at discharge   Patient with history of chronic IV drug use.      Bipolar disorder  No SI or HI voiced.  No symptoms or sign of psychosis  Continue home antidepressant medication      Polysubstance dependence including opioid type drug, continuous use  History of polysubstance abuse -methamphetamine, heroin and cocaine.  Unsure what patient's use before coming in   Will obtain urine drug screening at this time   Aspiration, seizure and fall precaution      Type 1 diabetes mellitus with hyperglycemia  Patient's FSGs are uncontrolled due to hyperglycemia on current medication regimen.  Last A1c reviewed-   Lab Results   Component Value Date    HGBA1C 9.2 (H) 07/16/2024     Most recent fingerstick glucose reviewed-   Recent Labs   Lab 07/28/24  0457 07/28/24  0600   POCTGLUCOSE 485* 423*     Current correctional scale  Medium  Maintain anti-hyperglycemic dose as follows-   Antihyperglycemics (From admission, onward)      Start     Stop Route Frequency Ordered    07/28/24 0900  insulin glargine U-100 (Lantus) pen 12 Units         -- SubQ Daily 07/28/24 0550    07/28/24 0715  insulin aspart U-100 pen 5 Units         -- SubQ 3 times daily with meals 07/28/24 0550    07/28/24 0649  insulin aspart U-100 pen 0-10  Units         -- SubQ Before meals & nightly PRN 07/28/24 0550          Hold Oral hypoglycemics while patient is in the hospital.  Will volume resuscitate as well.    CAD (coronary artery disease)  Patient with known CAD s, which is controlled Will continue ASA and Statin and monitor for S/Sx of angina/ACS. Continue to monitor on telemetry.     Moderate protein malnutrition  Nutrition consulted. Most recent weight and BMI monitored-     Measurements:  Wt Readings from Last 1 Encounters:   07/28/24 63.5 kg (140 lb)   Body mass index is 18.99 kg/m².    Patient has been screened and assessed by RD.    Malnutrition Type:  Context:    Level:      Malnutrition Characteristic Summary:       Interventions/Recommendations (treatment strategy):         Pulmonary nodule  History of pulmonary nodule   Greater than 40 pack-year cigarette smoking, still smokes  He's not ready to quit at this time  Follow-up with pulmonology at discharge.      Opioid abuse  History of opioid abuse   Unsure if this was contributory to presenting complaint   Will obtain urine drug screening at this time  Monitor for opioid withdrawal  Multimodal pain management      Tobacco use disorder, moderate, dependence  Dangers of cigarette smoking were reviewed with patient in detail. Patient was Counseled for 3-10 minutes. Nicotine replacement options were discussed. Nicotine replacement was discussed- not prescribed per patient's request  He is not ready for tobacco cessation at this time    Hemiparesis affecting right side as late effect of stroke  History of stroke   With mild residual right-sided weakness   Continue secondary prevention of stroke  Aspirin, atorvastatin.      Primary hypertension  Chronic, uncontrolled. Latest blood pressure and vitals reviewed-     Temp:  [97.4 °F (36.3 °C)-98 °F (36.7 °C)]   Pulse:  []   Resp:  [20-36]   BP: (109-140)/(63-90)   SpO2:  [93 %-100 %] .   Home meds for hypertension were reviewed and noted below.    Hypertension Medications               losartan (COZAAR) 100 MG tablet Take 1 tablet (100 mg total) by mouth once daily.    NIFEdipine (PROCARDIA-XL) 60 MG (OSM) 24 hr tablet Take 1 tablet (60 mg total) by mouth once daily.            While in the hospital, will manage blood pressure as follows; Continue home antihypertensive regimen    Will utilize p.r.n. blood pressure medication only if patient's blood pressure greater than 180/110 and he develops symptoms such as worsening chest pain or shortness of breath.      VTE Risk Mitigation (From admission, onward)           Ordered     enoxaparin injection 40 mg  Daily         07/28/24 0515     IP VTE HIGH RISK PATIENT  Once         07/28/24 0515     Place sequential compression device  Until discontinued         07/28/24 0515                       Patient admitted under my care to hospital medicine service 7/28/2024               Sohail Lantigua MD  Department of Hospital Medicine  [facility] - Emergency Dept

## 2024-07-28 NOTE — ASSESSMENT & PLAN NOTE
Patient's COPD is with exacerbation noted by continued dyspnea, use of accessory muscles for breathing, and worsening of baseline hypoxia currently.    He has greater than 40 pack-year cigarette smoking  Patient is currently on COPD Pathway. Continue scheduled inhalers Steroids, Antibiotics, and Supplemental oxygen and monitor respiratory status closely.   Consider BiPAP if patient becomes hypercapnic

## 2024-07-28 NOTE — ED PROVIDER NOTES
"Encounter Date: 7/28/2024    SCRIBE #1 NOTE: I, Mariah Carvalho, am scribing for, and in the presence of,  Cullen Bethea MD.       History     Chief Complaint   Patient presents with    Shortness of Breath     2-3 days SOB worsening tonight with accessory muscle use and O2 sat 71% when EMS arrived. Patient arrived on Cpap. Received 125 solumedrol, 1 duoneb, 2g Mg IV, 0.3 epi IM in route. Pt reports having a previous respiratory illness and is taking azithromycin at home.      59 yo M w/ PMHx of COPD, DM, HCV, HTN, stroke, polysubstance abuse, presenting to the ED for dyspnea for the past 2-3 days. Per EMS, he had SpO2 of 71% on RA upon their arrival PTA. They administered duoneb, 125 solumedrol, 2 Mag, and IV 0.3 mg epi en route, placed on CPAP w/ improvement. EMS report he is on azithromycin currently and was told he has an unspecified respiratory illness. CBG en route was 422.     History from patient limited at this time d/t acuity of condition.     The history is provided by the EMS personnel.     Review of patient's allergies indicates:  No Known Allergies  Past Medical History:   Diagnosis Date    Abnormal EEG 03/16/2024    3/16/2024 "Impression:   This is an abnormal awake and sleep vEEG consistent with a moderate diffuse encephalopathy and multifocal regions of cortical irritability that is most prominent in the right > left hemisphere.  No clinical events or electrographic seizures are recorded.  "    Acute on chronic pancreatitis 04/03/2016    Bacterial pneumonia 02/29/2020    Cavitary lung lesions  Parapneumonic effusion/ EMPYEMA    CHF (congestive heart failure)     Closed compression fracture of body of L1 vertebra 05/09/2020    COPD (chronic obstructive pulmonary disease) 08/08/2020    DM (diabetes mellitus), type 1     c/b DKA    HCV (hepatitis C virus)     08/2023 count < 12    HTN (hypertension)     Hx of psychiatric care     Stroke     R sided deficits    Substance abuse     IVDU heroin, " methamphetamines     Past Surgical History:   Procedure Laterality Date    CARDIAC SURGERY      stent placed. ([facility])    ESOPHAGOGASTRODUODENOSCOPY N/A 3/5/2020    Gastritis.  No H pylori.  Completed PPI for 1 month.  Procedure: EGD (ESOPHAGOGASTRODUODENOSCOPY);  Surgeon: Brinda Rene MD;  Location: [facility] ENDO;  Service: Endoscopy;  Laterality: N/A;  [id number] A; [phone]    LEFT HEART CATHETERIZATION Left 2020    Procedure: Left heart cath;  Surgeon: Fito Rangel MD;  Location: [facility] CATH LAB;  Service: Cardiology;  Laterality: Left;    SHOULDER SURGERY      right shoulder, spur removal.     Family History   Problem Relation Name Age of Onset    Asthma Mother       Social History     Tobacco Use    Smoking status: Former     Current packs/day: 0.00     Average packs/day: 1 pack/day for 39.2 years (39.2 ttl pk-yrs)     Types: Cigarettes     Start date: 4/3/1981     Quit date: 2020     Years since quittin.1    Smokeless tobacco: Never    Tobacco comments:     states approx 1 per day.   Substance Use Topics    Alcohol use: Not Currently     Alcohol/week: 0.0 standard drinks of alcohol    Drug use: Yes     Types: Heroin, IV, Marijuana, Amphetamines     Comment: chronic methamphetamine user     Review of Systems   Unable to perform ROS: Acuity of condition   Respiratory:  Positive for shortness of breath.        Physical Exam     Initial Vitals [24 0241]   BP Pulse Resp Temp SpO2   (!) 140/90 (!) 120 (!) 36 97.4 °F (36.3 °C) 100 %      MAP       --         Physical Exam    Nursing note and vitals reviewed.  Constitutional: He appears well-developed. He is not diaphoretic. No distress.   Skinny body habitus, Answering simple questions.      HENT:   Head: Normocephalic.   Eyes: EOM are normal.   Cardiovascular:  Regular rhythm.   Tachycardia present.         No murmur heard.  Pulmonary/Chest: Effort normal and breath sounds normal. He has no wheezes.   Tachypnea. Clear lungs bl. On  bipap.    Abdominal: Abdomen is soft. He exhibits no distension. There is no abdominal tenderness.   Musculoskeletal:         General: Normal range of motion.     Neurological: He is alert.   Skin: Skin is warm.         ED Course   Critical Care    Date/Time: 7/28/2024 2:52 AM    Performed by: Cullen Bethea MD  Authorized by: Cullen Bethea MD  Direct patient critical care time: 15 minutes  Additional history critical care time: 10 minutes  Ordering / reviewing critical care time: 5 minutes  Documentation critical care time: 5 minutes  Consulting other physicians critical care time: 5 minutes  Consult with family critical care time: 5 minutes  Total critical care time (exclusive of procedural time) : 45 minutes  Critical care time was exclusive of separately billable procedures and treating other patients and teaching time.  Critical care was necessary to treat or prevent imminent or life-threatening deterioration of the following conditions: respiratory failure.  Critical care was time spent personally by me on the following activities: development of treatment plan with patient or surrogate, evaluation of patient's response to treatment, examination of patient, obtaining history from patient or surrogate, ordering and performing treatments and interventions, ordering and review of laboratory studies, ordering and review of radiographic studies, re-evaluation of patient's condition, review of old charts, pulse oximetry, ventilator management and blood draw for specimens.  Comments: COPD exacerbation requiring noninvasive mechanical ventilation        Labs Reviewed   CBC W/ AUTO DIFFERENTIAL - Abnormal       Result Value    WBC 9.71      RBC 5.35      Hemoglobin 13.0 (*)     Hematocrit 43.0      MCV 80 (*)     MCH 24.3 (*)     MCHC 30.2 (*)     RDW 14.5      Platelets 403      MPV 9.7      Immature Granulocytes 0.3      Gran # (ANC) 5.8      Immature Grans (Abs) 0.03      Lymph # 2.3      Mono # 0.8      Eos  # 0.8 (*)     Baso # 0.03      nRBC 0      Gran % 59.5      Lymph % 23.8      Mono % 8.3      Eosinophil % 7.8      Basophil % 0.3      Differential Method Automated     COMPREHENSIVE METABOLIC PANEL - Abnormal    Sodium 135 (*)     Potassium 4.5      Chloride 99      CO2 22 (*)     Glucose 458 (*)     BUN 12      Creatinine 1.2      Calcium 9.3      Total Protein 8.0      Albumin 3.6      Total Bilirubin 0.3      Alkaline Phosphatase 103      AST 15      ALT 13      eGFR >60      Anion Gap 14      Narrative:     GLUCOSE critical result(s) called and verbal readback obtained from   AMANDA KNUTSON  by ISMAEL 07/28/2024 03:43   BETA - HYDROXYBUTYRATE, SERUM - Abnormal    Beta-Hydroxybutyrate 0.6 (*)    MAGNESIUM - Abnormal    Magnesium 3.7 (*)    ISTAT PROCEDURE - Abnormal    POC PH 7.243 (*)     POC PCO2 69.8 (*)     POC PO2 27 (*)     POC HCO3 30.2 (*)     POC BE 1      POC SATURATED O2 39      POC TCO2 32 (*)     Rate 18      Sample VENOUS      Site Other      Allens Test N/A      DelSys CPAP/BiPAP      Mode BiPAP      FiO2 5      Spont Rate 30      Min Vol 13.1      Sp02 99      IP 16      EP 8     ISTAT PROCEDURE - Abnormal    POC PH 7.164 (*)     POC PCO2 43.9      POC PO2 29 (*)     POC HCO3 15.8 (*)     POC BE -12 (*)     POC SATURATED O2 41      POC TCO2 17 (*)     Rate 18      Sample VENOUS      Site Other      Allens Test N/A      DelSys CPAP/BiPAP      Mode BiPAP      FiO2 50      Spont Rate 26      Min Vol 13.1      Sp02 100      IP 16      EP 8     POCT GLUCOSE - Abnormal    POCT Glucose 485 (*)    ISTAT PROCEDURE - Abnormal    POC PH 7.411      POC PCO2 38.0      POC PO2 62 (*)     POC HCO3 24.2      POC BE 0      POC SATURATED O2 92      POC TCO2 25      Sample ARTERIAL      Site RR      Allens Test N/A      DelSys Room Air      Mode SPONT      FiO2 21      Sp02 96     POCT GLUCOSE - Abnormal    POCT Glucose 423 (*)    CULTURE, BLOOD   CULTURE, BLOOD   TROPONIN I    Troponin I 0.008     B-TYPE NATRIURETIC  PEPTIDE    BNP 30     PHOSPHORUS   MAGNESIUM   PHOSPHORUS    Phosphorus 3.3     LACTIC ACID, PLASMA   URINALYSIS, REFLEX TO URINE CULTURE   DRUG SCREEN PANEL, URINE EMERGENCY   URINALYSIS MICROSCOPIC   ISTAT LACTATE    POC Lactate 1.44      Rate 18      Sample VENOUS      Site Other      Allens Test N/A      DelSys CPAP/BiPAP      Mode BiPAP      FiO2 50      Spont Rate 30      Min Vol 13.1      Sp02 98      IP 16      EP 8     POCT GLUCOSE MONITORING CONTINUOUS   POCT GLUCOSE MONITORING CONTINUOUS          Imaging Results              X-Ray Chest AP Portable (Final result)  Result time 07/28/24 03:22:25      Final result by Khris Anna MD (07/28/24 03:22:25)                   Impression:      Suspected mild infrahilar and basilar interstitial infiltrates with superimposed suspected mild alveolar infiltrate at the right lung base, with minimal pleural fluid on the right.      Electronically signed by: Khris Anna  Date:    07/28/2024  Time:    03:22               Narrative:    EXAMINATION:  XR CHEST AP PORTABLE    CLINICAL HISTORY:  Sepsis;    TECHNIQUE:  Single frontal view of the chest was performed.    COMPARISON:  Chest radiograph March 4, 2024    FINDINGS:  Single portable chest view is submitted.  The cardiomediastinal silhouette appears appropriate.    Minimal linear density at the left lower chest may relate to mild atelectasis.  There is mild accentuation of the interstitial markings about the infrahilar region and lung bases that may relate to mild interstitial infiltrate, mild hazy opacity at the right lung base medially may relate to mild component of alveolar infiltrate.  There is also minimal blunting at the right costophrenic angle, this may relate to a small amount of pleural fluid.    There is no large pleural effusion and there is no pneumothorax.  The osseous structures appear intact.                                       Medications   aspirin chewable tablet 81 mg (has no  administration in time range)   atorvastatin tablet 20 mg (has no administration in time range)   carBAMazepine tablet 400 mg (has no administration in time range)   folic acid tablet 1 mg (has no administration in time range)   losartan tablet 100 mg (has no administration in time range)   NIFEdipine 24 hr tablet 60 mg (has no administration in time range)   thiamine tablet 100 mg (has no administration in time range)   venlafaxine 24 hr capsule 75 mg (has no administration in time range)   sodium chloride 0.9% flush 3 mL (has no administration in time range)   predniSONE tablet 40 mg (has no administration in time range)   albuterol-ipratropium 2.5 mg-0.5 mg/3 mL nebulizer solution 3 mL (has no administration in time range)   enoxaparin injection 40 mg (has no administration in time range)   cefTRIAXone (Rocephin) 1 g in D5W 100 mL IVPB (MB+) (has no administration in time range)     And   azithromycin tablet 500 mg (has no administration in time range)   melatonin tablet 6 mg (has no administration in time range)   ondansetron disintegrating tablet 8 mg (has no administration in time range)   ondansetron disintegrating tablet 8 mg (has no administration in time range)   polyethylene glycol packet 17 g (has no administration in time range)   acetaminophen tablet 650 mg (has no administration in time range)   acetaminophen tablet 650 mg (has no administration in time range)   HYDROcodone-acetaminophen 5-325 mg per tablet 1 tablet (has no administration in time range)   LORazepam tablet 2 mg (has no administration in time range)   multivitamin tablet (has no administration in time range)   glucose chewable tablet 16 g (has no administration in time range)   glucose chewable tablet 24 g (has no administration in time range)   glucagon (human recombinant) injection 1 mg (has no administration in time range)   insulin glargine U-100 (Lantus) pen 12 Units (has no administration in time range)   insulin aspart U-100 pen  0-10 Units (has no administration in time range)   insulin aspart U-100 pen 5 Units (has no administration in time range)   lactated ringers infusion (has no administration in time range)   dextrose 10% bolus 125 mL 125 mL (has no administration in time range)   dextrose 10% bolus 250 mL 250 mL (has no administration in time range)   cefTRIAXone (ROCEPHIN) 2 g in D5W 100 mL IVPB (MB+) (0 g Intravenous Stopped 7/28/24 2236)   azithromycin (ZITHROMAX) 500 mg in D5W 250 mL IVPB (Vial-Mate) (0 mg Intravenous Stopped 7/28/24 0444)   sodium chloride 0.9% bolus 1,000 mL 1,000 mL (0 mLs Intravenous Stopped 7/28/24 2419)   albuterol-ipratropium 2.5 mg-0.5 mg/3 mL nebulizer solution 3 mL (3 mLs Nebulization Given 7/28/24 1495)   insulin regular injection 5 Units 0.05 mL (5 Units Intravenous Given 7/28/24 8058)     Medical Decision Making    58-year-old male presenting with dyspnea.  Patient has been treated outpatient with unknown antibiotic for possible respiratory infection.  Prior to arrival EMS had started the patient on BiPAP, provided Solu-Medrol, IM epinephrine, DuoNeb.  Once the patient arrived in the emergency room is respiratory is had significantly improve however BiPAP continued due to tachypnea.  Respiratory sounds clear.  Initial pH 7.2, CO2 62.  CO2 gradually improved BiPAP and patient was transitioned to 2 L nasal cannula.  Suspected COPD exacerbation along with pneumonia with bilateral infiltrates noted on chest x-ray imaging.  Patient was provided Rocephin and azithromycin.    ECG:  Please check workup area for ECG interpretation.    Part of the note was done using electronic dictation services.       Amount and/or Complexity of Data Reviewed  Labs: ordered. Decision-making details documented in ED Course.  Radiology: ordered. Decision-making details documented in ED Course.  ECG/medicine tests: ordered and independent interpretation performed. Decision-making details documented in ED Course.    Risk  OTC  "drugs.  Prescription drug management.  Decision regarding hospitalization.          This patient does have evidence of infective focus  My overall impression is sepsis.  Source: Respiratory  Antibiotics given-   Antibiotics (72h ago, onward)      Start     Stop Route Frequency Ordered    07/29/24 0900  azithromycin tablet 500 mg  (CEFTRIAXONE IV/ AZITHROMYCIN PO PANEL)        Placed in "And" Linked Group    -- Oral Daily 07/28/24 0515    07/29/24 0300  cefTRIAXone (Rocephin) 1 g in D5W 100 mL IVPB (MB+)  (CEFTRIAXONE IV/ AZITHROMYCIN PO PANEL)        Placed in "And" Linked Group    -- IV Every 24 hours (non-standard times) 07/28/24 0515          Latest lactate reviewed-  Recent Labs   Lab 07/28/24  0258   POCLAC 1.44     Organ dysfunction indicated by Acute respiratory failure    Fluid challenge Not needed - patient is not hypotensive      Post- resuscitation assessment No - Post resuscitation assessment not needed       Will Not start Pressors- Levophed for MAP of 65  Source control achieved by:  Antibiotics                 ED Course as of 07/28/24 0624   Sun Jul 28, 2024   0333 POC PH(!!): 7.243 [JM]   0333 POC PCO2(!): 69.8 [JM]   0333 Beta-Hydroxybutyrate(!): 0.6 [JM]   0421 POC PH(!!): 7.243  Hypercapnic respiratory acidosis [JM]   0421 Anion Gap: 14  Non-anion gap hyperglycemia.  No DKA. [JM]   0447 Patient is now on 2 L nasal cannula.  Mild expiratory wheeze.  Much improved.  No respiratory distress or tachypnea.  Results discussed with the patient. [JM]   0620 EKG 12-lead  Time 2:15 a.m.     Rate 121, sinus, regular rhythm, normal axis.   QRS 92 .  No ST elevation or depression.  No T-wave inversion no hyperacute T-waves.      Sinus tachycardia. [JM]      ED Course User Index  [JM] Cullen Bethea MD                       I, Cullen Bethea, personally performed the services described in this documentation. All medical record entries made by the scribe were at my direction and in my presence. " I have reviewed the chart and agree that the record reflects my personal performance and is accurate and complete.      DISCLAIMER: This note was prepared with [employer] voice recognition transcription software. Garbled syntax, mangled pronouns, and other bizarre constructions may be attributed to that software system.      Clinical Impression:  Final diagnoses:  [R00.0] Tachycardia  [J18.9] Pneumonia of both lower lobes due to infectious organism (Primary)  [J44.1] COPD exacerbation  [R73.9] Hyperglycemia          ED Disposition Condition    Admit                 Cullen Bethea MD  07/28/24 0624

## 2024-07-28 NOTE — ASSESSMENT & PLAN NOTE
History of polysubstance abuse -methamphetamine, heroin and cocaine.  Unsure what patient's use before coming in   Will obtain urine drug screening at this time   Aspiration, seizure and fall precaution

## 2024-07-28 NOTE — ASSESSMENT & PLAN NOTE
Nutrition consulted. Most recent weight and BMI monitored-     Measurements:  Wt Readings from Last 1 Encounters:   07/28/24 63.5 kg (140 lb)   Body mass index is 18.99 kg/m².    Patient has been screened and assessed by RD.    Malnutrition Type:  Context:    Level:      Malnutrition Characteristic Summary:       Interventions/Recommendations (treatment strategy):

## 2024-07-28 NOTE — ASSESSMENT & PLAN NOTE
Dangers of cigarette smoking were reviewed with patient in detail. Patient was Counseled for 3-10 minutes. Nicotine replacement options were discussed. Nicotine replacement was discussed- not prescribed per patient's request. States he quit smoking earlier this month. Encouraged continued cessation.

## 2024-07-28 NOTE — ASSESSMENT & PLAN NOTE
Chronic, uncontrolled. Latest blood pressure and vitals reviewed-     Temp:  [97.4 °F (36.3 °C)-98 °F (36.7 °C)]   Pulse:  []   Resp:  [20-36]   BP: (109-140)/(63-90)   SpO2:  [93 %-100 %] .   Home meds for hypertension were reviewed and noted below.   Hypertension Medications               losartan (COZAAR) 100 MG tablet Take 1 tablet (100 mg total) by mouth once daily.    NIFEdipine (PROCARDIA-XL) 60 MG (OSM) 24 hr tablet Take 1 tablet (60 mg total) by mouth once daily.            While in the hospital, will manage blood pressure as follows; Continue home antihypertensive regimen    Will utilize p.r.n. blood pressure medication only if patient's blood pressure greater than 180/110 and he develops symptoms such as worsening chest pain or shortness of breath.

## 2024-07-28 NOTE — ED NOTES
Pt had a bowel movement and diaper wet. New diaper and gown applied.   Pt provided with warm blankets

## 2024-07-28 NOTE — ASSESSMENT & PLAN NOTE
Anemia is likely due to  chronic disease . Most recent hemoglobin and hematocrit are listed below.  Recent Labs     07/28/24  0253   HGB 13.0*   HCT 43.0     Plan  - Monitor serial CBC: Daily  - Transfuse PRBC if patient becomes hemodynamically unstable, symptomatic or H/H drops below 7/21.  - Patient has not received any PRBC transfusions to date  - Patient's anemia is currently stable  - hemoglobin in the normal range at this time, this maybe due to hemo concentration in the setting of osmotic diuresis  - we will repeat H and H when patient is volume resuscitated

## 2024-07-28 NOTE — ASSESSMENT & PLAN NOTE
Patient with known CAD s, which is controlled Will continue ASA and Statin and monitor for S/Sx of angina/ACS. Continue to monitor on telemetry.

## 2024-07-28 NOTE — CONSULTS
"[facility] - Telemetry  Pulmonology  Consult Note    Patient Name: Cali Mabry  MRN: [id number]  Admission Date: 7/28/2024  Hospital Length of Stay: 0 days  Code Status: Full Code  Attending Physician: Darren Gorman MD  Primary Care Provider: Lawanda Flores NP   Principal Problem: COPD exacerbation    Inpatient consult to Pulmonology  Consult performed by: Chiki Soler MD  Consult ordered by: Cullen Bethea MD        Subjective:     HPI:  Pt is a 59 yo CW pmh COPD who states he has had worsening shortness of breath over the past 2-3 days. He has had increased use of nebulizer. States he does not use a maintenance inhaler, despite Anoro Ellipta on MAR. Unable to reach mother (straight to full mailbox) or sister cell (could not be reached at this time message) to confirm. Pt was recently seen virtually by primary care who started on azithro, but avoided steroids due to labile and poorly controlled DM1. Pt denies cough with production, fevers, sore throat. Had some URI symptoms. Pt was found to be hypoxic to 73% and placed on CPAP with EMS. Pt had associated metabolic without respiratory compensation due to COPD exacerbation. Improved and transitioned to NC. Pt started on azithromycin and steroid. Noted to be hyperglycemic.     Past Medical History:   Diagnosis Date    Abnormal EEG 03/16/2024    3/16/2024 "Impression:   This is an abnormal awake and sleep vEEG consistent with a moderate diffuse encephalopathy and multifocal regions of cortical irritability that is most prominent in the right > left hemisphere.  No clinical events or electrographic seizures are recorded.  "    Acute on chronic pancreatitis 04/03/2016    Bacterial pneumonia 02/29/2020    Cavitary lung lesions  Parapneumonic effusion/ EMPYEMA    CHF (congestive heart failure)     Closed compression fracture of body of L1 vertebra 05/09/2020    COPD (chronic obstructive pulmonary disease) 08/08/2020    DM (diabetes mellitus), type 1     " c/b DKA    HCV (hepatitis C virus)     2023 count < 12    HTN (hypertension)     Hx of psychiatric care     Stroke     R sided deficits    Substance abuse     IVDU heroin, methamphetamines       Past Surgical History:   Procedure Laterality Date    CARDIAC SURGERY      stent placed. ([facility])    ESOPHAGOGASTRODUODENOSCOPY N/A 3/5/2020    Gastritis.  No H pylori.  Completed PPI for 1 month.  Procedure: EGD (ESOPHAGOGASTRODUODENOSCOPY);  Surgeon: Brinda Rene MD;  Location: [facility] ENDO;  Service: Endoscopy;  Laterality: N/A;  W421 A; [id number]    LEFT HEART CATHETERIZATION Left 2020    Procedure: Left heart cath;  Surgeon: Fito Rangel MD;  Location: [facility] CATH LAB;  Service: Cardiology;  Laterality: Left;    SHOULDER SURGERY      right shoulder, spur removal.       Review of patient's allergies indicates:  No Known Allergies    Family History       Problem Relation (Age of Onset)    Asthma Mother          Tobacco Use    Smoking status: Former     Current packs/day: 0.00     Average packs/day: 1 pack/day for 39.2 years (39.2 ttl pk-yrs)     Types: Cigarettes     Start date: 4/3/1981     Quit date: 2020     Years since quittin.1    Smokeless tobacco: Never    Tobacco comments:     states approx 1 per day.   Substance and Sexual Activity    Alcohol use: Not Currently     Alcohol/week: 0.0 standard drinks of alcohol    Drug use: Yes     Types: Heroin, IV, Marijuana, Amphetamines     Comment: chronic methamphetamine user    Sexual activity: Yes     Partners: Female         Review of Systems   Constitutional:  Positive for activity change and fatigue. Negative for chills, diaphoresis and fever.   HENT:  Positive for congestion. Negative for ear pain, postnasal drip, sneezing and sore throat.    Respiratory:  Positive for shortness of breath and wheezing. Negative for cough and chest tightness.    Cardiovascular:  Negative for chest pain, palpitations and leg swelling.    Gastrointestinal:  Negative for abdominal distention, diarrhea and nausea.   Musculoskeletal:  Negative for arthralgias and myalgias.   Neurological:  Negative for dizziness, seizures, syncope and light-headedness.   Psychiatric/Behavioral:  Negative for agitation, behavioral problems and confusion.      Objective:     Vital Signs (Most Recent):  Temp: 98 °F (36.7 °C) (07/28/24 1110)  Pulse: 91 (07/28/24 1110)  Resp: 18 (07/28/24 1110)  BP: 122/70 (07/28/24 1110)  SpO2: 99 % (07/28/24 1110) Vital Signs (24h Range):  Temp:  [97.4 °F (36.3 °C)-98 °F (36.7 °C)] 98 °F (36.7 °C)  Pulse:  [] 91  Resp:  [17-36] 18  SpO2:  [93 %-100 %] 99 %  BP: (109-140)/(63-90) 122/70     Weight: 63.5 kg (139 lb 15.9 oz)  Body mass index is 18.99 kg/m².      Intake/Output Summary (Last 24 hours) at 7/28/2024 1300  Last data filed at 7/28/2024 0449  Gross per 24 hour   Intake 1349 ml   Output --   Net 1349 ml        Physical Exam  Constitutional:       Appearance: He is not ill-appearing or toxic-appearing.   Cardiovascular:      Heart sounds: No murmur heard.     No friction rub. No gallop.   Pulmonary:      Breath sounds: Wheezing and rales (right lower lung fields.) present. No rhonchi.      Comments: Prolonged expiratory phase with expiratory wheezes.   Skin:     Capillary Refill: Capillary refill takes less than 2 seconds.   Neurological:      General: No focal deficit present.      Mental Status: He is alert and oriented to person, place, and time.      Motor: No weakness.   Psychiatric:         Thought Content: Thought content normal.      Comments: Flat affect.           Vents:  Oxygen Concentration (%): 50 (07/28/24 0254)    Lines/Drains/Airways       Drain  Duration             Male External Urinary Catheter 07/28/24 0330 Medium <1 day              Peripheral Intravenous Line  Duration                  Peripheral IV - Single Lumen 07/28/24 0246 22 G 3/4 in Anterior;Distal;Left Forearm <1 day         Peripheral IV - Single  Lumen 07/28/24 0256 20 G Anterior;Left Forearm <1 day                    Significant Labs:    CBC/Anemia Profile:  Recent Labs   Lab 07/28/24  0253   WBC 9.71   HGB 13.0*   HCT 43.0      MCV 80*   RDW 14.5        Chemistries:  Recent Labs   Lab 07/28/24  0253   *   K 4.5   CL 99   CO2 22*   BUN 12   CREATININE 1.2   CALCIUM 9.3   ALBUMIN 3.6   PROT 8.0   BILITOT 0.3   ALKPHOS 103   ALT 13   AST 15   MG 3.7*   PHOS 3.3       All pertinent labs within the past 24 hours have been reviewed.    Significant Imaging:   I have reviewed all pertinent imaging results/findings within the past 24 hours.    ABG  Recent Labs   Lab 07/28/24  0556   PH 7.411   PO2 62*   PCO2 38.0   HCO3 24.2   BE 0     Assessment/Plan:     Pulmonary  * COPD exacerbation  Patient's COPD is with exacerbation noted by use of accessory muscles for breathing and worsening of baseline hypoxia currently.  Patient is currently on COPD Pathway. Continue scheduled inhalers Steroids, Antibiotics, and Supplemental oxygen and monitor respiratory status closely.   If worsening respiratory status on current regimen would work up for DKA as will be unable to compensate for metabolic acidosis with active COPD exacerbation.     Pulmonary nodule  Stable 5 mm pulmonary nodule. No further work up from that stand point, however would potentially benefit from outpatient LDCT scan after conversation about potential repeat imaging, biopsy and treatment if lung cancer discovered.     Endocrine  Type 1 diabetes mellitus with hyperglycemia  Patient's FSGs are uncontrolled due to hyperglycemia on current medication regimen.  Last A1c reviewed-   Lab Results   Component Value Date    HGBA1C 9.2 (H) 07/16/2024     Most recent fingerstick glucose reviewed-   Recent Labs   Lab 07/28/24  0600 07/28/24  0835 07/28/24  0837 07/28/24  1157   POCTGLUCOSE 423* 427* 459* 485*     Current correctional scale  High  worsening. Will adjust treatment as follows: per primary team   anti-hyperglycemic dose as follows-   Antihyperglycemics (From admission, onward)      Start     Stop Route Frequency Ordered    07/28/24 2100  insulin glargine U-100 (Lantus) pen 15 Units         -- SubQ 2 times daily 07/28/24 1221    07/28/24 1645  insulin aspart U-100 pen 10 Units         -- SubQ 3 times daily with meals 07/28/24 1221    07/28/24 0649  insulin aspart U-100 pen 0-10 Units         -- SubQ Before meals & nightly PRN 07/28/24 0550          Hold Oral hypoglycemics while patient is in the hospital.    Other  Tobacco use disorder, moderate, dependence  Dangers of cigarette smoking were reviewed with patient in detail. Patient was Counseled for 3-10 minutes. Nicotine replacement options were discussed. Nicotine replacement was discussed- not prescribed per patient's request. States he quit smoking earlier this month. Encouraged continued cessation.           Thank you for your consult. I will sign off. Please contact us if you have any additional questions.     Chiki Soler MD  Pulmonology  [facility] [place] - Telemetry

## 2024-07-28 NOTE — HPI
"58-year-old male with medical history significant for cerebrovascular accident with mild residual right-sided weakness, chronic obstructive pulmonary disease, type 1 diabetes mellitus, hepatitis-C infection, hypertension, polysubstance abuse- methamphetamine, heroin and tobacco use disorder with 40 pack-year cigarette smoking who was brought to the emergency department by EMS after being called for worsening shortness a breath, per EMS he was saturating 71% on room air upon arrival.  For which he was started on CPAP, given 125 mg of Solu-Medrol, nebulized with DuoNebs, 2 mg of magnesium sulfate and 0.3 epi enroute emergency room.  He came in on CPAP, with VBG obtained showing pH of 7.26, pCO2 of 69.8, 0 2 of 27, with bicarb of 30.3, he could had with of the who wheezing, with use of accessory muscle of respiration, voiced associated generalized weakness.  He denied fever, chills or rigors, no chest pain, palpitation, orthopnea or paroxysmal nocturnal dyspnea.  He also denied urinary symptoms of dysuria, frequency, urgency, straining at micturition or hematuria.  He has not had any nausea, vomiting, change in bowel habits.  Denied recent fall, no headache, or lightheadedness  He currently lives with his mom.  Lab obtained in the emergency room showed H&H of 13/43, with WBC of 9.71.  Random blood glucose elevated at 458, BUN/creatinine of 12/1.2, serum bicarbonate of 22, anion gap of 14.  Troponin within normal limits at 0.008, BNP was 30.  Chest x-ray obtained showed " suspected mild infrahilar and basilar interstitial infiltrates with superimposed suspected mild alveolar infiltrate at the right lung base, with minimal pleural fluid on the right".  Was started on ceftriaxone and azithromycin in the emergency room.  "

## 2024-07-28 NOTE — ASSESSMENT & PLAN NOTE
Dangers of cigarette smoking were reviewed with patient in detail. Patient was Counseled for 3-10 minutes. Nicotine replacement options were discussed. Nicotine replacement was discussed- not prescribed per patient's request  He is not ready for tobacco cessation at this time

## 2024-07-28 NOTE — PLAN OF CARE
Pt seen and examined. Admitted for copd exacerbation which appears to be secondary to pneumonia. Improved on steroids/duonebs/antibiotics, minimal wheezing at bedside on my exam.     Also has hx of T1DM w/ hyperglycemia and hx of DKA and now on steroids. Uptitrating insulin. Low threshold to move to ICU for insulin gtt.

## 2024-07-28 NOTE — ASSESSMENT & PLAN NOTE
History of hepatitis-C infection   Likely chronic infection, he could not tell me if he has been treated in the past  Follow-up with GI/Infectious Disease at discharge   Patient with history of chronic IV drug use.

## 2024-07-28 NOTE — ASSESSMENT & PLAN NOTE
History of opioid abuse   Unsure if this was contributory to presenting complaint   Will obtain urine drug screening at this time  Monitor for opioid withdrawal  Multimodal pain management

## 2024-07-28 NOTE — SUBJECTIVE & OBJECTIVE
"Past Medical History:   Diagnosis Date    Abnormal EEG 03/16/2024    3/16/2024 "Impression:   This is an abnormal awake and sleep vEEG consistent with a moderate diffuse encephalopathy and multifocal regions of cortical irritability that is most prominent in the right > left hemisphere.  No clinical events or electrographic seizures are recorded.  "    Acute on chronic pancreatitis 04/03/2016    Bacterial pneumonia 02/29/2020    Cavitary lung lesions  Parapneumonic effusion/ EMPYEMA    CHF (congestive heart failure)     Closed compression fracture of body of L1 vertebra 05/09/2020    COPD (chronic obstructive pulmonary disease) 08/08/2020    DM (diabetes mellitus), type 1     c/b DKA    HCV (hepatitis C virus)     08/2023 count < 12    HTN (hypertension)     Hx of psychiatric care     Stroke     R sided deficits    Substance abuse     IVDU heroin, methamphetamines       Past Surgical History:   Procedure Laterality Date    CARDIAC SURGERY  1999    stent placed. ([facility])    ESOPHAGOGASTRODUODENOSCOPY N/A 3/5/2020    Gastritis.  No H pylori.  Completed PPI for 1 month.  Procedure: EGD (ESOPHAGOGASTRODUODENOSCOPY);  Surgeon: Brinda Rene MD;  Location: [facility] ENDO;  Service: Endoscopy;  Laterality: N/A;  W421 A; [id number]    LEFT HEART CATHETERIZATION Left 9/28/2020    Procedure: Left heart cath;  Surgeon: Fito Rangel MD;  Location: [facility] CATH LAB;  Service: Cardiology;  Laterality: Left;    SHOULDER SURGERY  2013    right shoulder, spur removal.       Review of patient's allergies indicates:  No Known Allergies    No current facility-administered medications on file prior to encounter.     Current Outpatient Medications on File Prior to Encounter   Medication Sig    albuterol (PROVENTIL/VENTOLIN HFA) 90 mcg/actuation inhaler Inhale 1-2 puffs into the lungs every 6 (six) hours as needed for Wheezing. Rescue    aspirin 81 MG Chew Take 1 tablet (81 mg total) by mouth once daily.    atorvastatin (LIPITOR) " "20 MG tablet Take 1 tablet (20 mg total) by mouth once daily.    BD GERTRUDIS 2ND GEN PEN NEEDLE 32 gauge x 5/32" Ndle Inject into the skin 4 (four) times daily.    carBAMazepine (TEGRETOL XR) 200 MG 12 hr tablet Take 1 tablet (200 mg total) by mouth once daily.    carBAMazepine (TEGRETOL) 200 mg tablet Take 2 tablets (400 mg total) by mouth every evening.    folic acid (FOLVITE) 1 MG tablet Take 1 tablet (1 mg total) by mouth once daily.    FREESTYLE SEBASTIAN 2 SENSOR Kit USE FOUR TIMES PER WEEK AS DIRECTED    insulin detemir U-100, Levemir, 100 unit/mL (3 mL) SubQ InPn pen Inject 10 Units into the skin 2 (two) times daily. Inject 10 units under the skin in the morning and 10 units in the evening.    insulin lispro 100 unit/mL pen Inject 5 Units into the skin 3 (three) times daily before meals.    levocetirizine (XYZAL) 5 MG tablet Take 5 mg by mouth every evening.    losartan (COZAAR) 100 MG tablet Take 1 tablet (100 mg total) by mouth once daily.    naloxone (NARCAN) 4 mg/actuation Spry 4mg by nasal route as needed for opioid overdose; may repeat every 2-3 minutes in alternating nostrils until medical help arrives. Call 911    NIFEdipine (PROCARDIA-XL) 60 MG (OSM) 24 hr tablet Take 1 tablet (60 mg total) by mouth once daily.    thiamine 100 MG tablet Take 1 tablet (100 mg total) by mouth once daily.    venlafaxine (EFFEXOR-XR) 75 MG 24 hr capsule Take 1 capsule (75 mg total) by mouth once daily.    [DISCONTINUED] insulin aspart U-100 (NOVOLOG) 100 unit/mL injection Inject 1-10 Units into the skin 3 (three) times daily with meals.     Family History       Problem Relation (Age of Onset)    Asthma Mother          Tobacco Use    Smoking status: Former     Current packs/day: 0.00     Average packs/day: 1 pack/day for 39.2 years (39.2 ttl pk-yrs)     Types: Cigarettes     Start date: 4/3/1981     Quit date: 2020     Years since quittin.1    Smokeless tobacco: Never    Tobacco comments:     states approx 1 per day. "   Substance and Sexual Activity    Alcohol use: Not Currently     Alcohol/week: 0.0 standard drinks of alcohol    Drug use: Yes     Types: Heroin, IV, Marijuana, Amphetamines     Comment: chronic methamphetamine user    Sexual activity: Yes     Partners: Female     Review of Systems   Constitutional:  Negative for appetite change, chills, diaphoresis and fatigue.   HENT:  Negative for congestion, sore throat and tinnitus.    Eyes:  Negative for pain, discharge and itching.   Respiratory:  Positive for shortness of breath and wheezing. Negative for cough and chest tightness.    Cardiovascular:  Negative for chest pain, palpitations and leg swelling.   Gastrointestinal:  Negative for abdominal distention, abdominal pain, blood in stool, nausea and vomiting.   Endocrine: Negative for cold intolerance, heat intolerance and polydipsia.   Genitourinary:  Negative for difficulty urinating, dysuria, flank pain, frequency and hematuria.   Musculoskeletal:  Negative for arthralgias and back pain.   Skin:  Negative for color change, pallor, rash and wound.   Neurological:  Negative for dizziness, seizures, facial asymmetry, light-headedness, numbness and headaches.   Psychiatric/Behavioral:  Negative for agitation, confusion and hallucinations.      Objective:     Vital Signs (Most Recent):  Temp: 98 °F (36.7 °C) (07/28/24 0546)  Pulse: 101 (07/28/24 0546)  Resp: 20 (07/28/24 0546)  BP: 115/66 (07/28/24 0532)  SpO2: 99 % (07/28/24 0546) Vital Signs (24h Range):  Temp:  [97.4 °F (36.3 °C)-98 °F (36.7 °C)] 98 °F (36.7 °C)  Pulse:  [] 101  Resp:  [20-36] 20  SpO2:  [93 %-100 %] 99 %  BP: (109-140)/(63-90) 115/66     Weight: 63.5 kg (140 lb)  Body mass index is 18.99 kg/m².     Physical Exam  Vitals reviewed.   Constitutional:       General: He is in acute distress.      Appearance: He is ill-appearing.   HENT:      Head: Normocephalic and atraumatic.      Nose: Nose normal. No congestion or rhinorrhea.      Mouth/Throat:       Mouth: Mucous membranes are dry.   Eyes:      General: No scleral icterus.     Extraocular Movements: Extraocular movements intact.      Conjunctiva/sclera: Conjunctivae normal.      Pupils: Pupils are equal, round, and reactive to light.   Cardiovascular:      Rate and Rhythm: Regular rhythm. Tachycardia present.      Pulses: Normal pulses.      Heart sounds: Normal heart sounds. No murmur heard.  Pulmonary:      Effort: Respiratory distress present.      Breath sounds: Wheezing and rales present. No rhonchi.   Chest:      Chest wall: No tenderness.   Abdominal:      General: Abdomen is flat. Bowel sounds are normal. There is no distension.      Palpations: Abdomen is soft.      Tenderness: There is no abdominal tenderness. There is no right CVA tenderness, left CVA tenderness, guarding or rebound.   Musculoskeletal:         General: No swelling. Normal range of motion.      Cervical back: Normal range of motion. No rigidity or tenderness.      Right lower leg: No edema.      Left lower leg: No edema.   Skin:     General: Skin is warm and dry.      Coloration: Skin is not jaundiced.      Findings: No bruising or erythema.   Neurological:      General: No focal deficit present.      Mental Status: He is alert and oriented to person, place, and time.      Cranial Nerves: No cranial nerve deficit.      Sensory: No sensory deficit.   Psychiatric:         Mood and Affect: Mood normal.         Behavior: Behavior normal.         Thought Content: Thought content normal.              CRANIAL NERVES     CN III, IV, VI   Pupils are equal, round, and reactive to light.       Significant Labs: All pertinent labs within the past 24 hours have been reviewed.  ABGs:   Recent Labs   Lab 07/28/24  0254 07/28/24  0432 07/28/24  0556   PH 7.243* 7.164* 7.411   PCO2 69.8* 43.9 38.0   HCO3 30.2* 15.8* 24.2   POCSATURATED 39 41 92   BE 1 -12* 0   PO2 27* 29* 62*     Bilirubin:   Recent Labs   Lab 07/28/24  0253   BILITOT 0.3     CBC:    Recent Labs   Lab 07/28/24 0253   WBC 9.71   HGB 13.0*   HCT 43.0        CMP:   Recent Labs   Lab 07/28/24 0253   *   K 4.5   CL 99   CO2 22*   *   BUN 12   CREATININE 1.2   CALCIUM 9.3   PROT 8.0   ALBUMIN 3.6   BILITOT 0.3   ALKPHOS 103   AST 15   ALT 13   ANIONGAP 14     Cardiac Markers:   Recent Labs   Lab 07/28/24 0253   BNP 30     Troponin:   Recent Labs   Lab 07/28/24 0253   TROPONINI 0.008     Significant Imaging: I have reviewed all pertinent imaging results/findings within the past 24 hours.  Imaging Results              X-Ray Chest AP Portable (Final result)  Result time 07/28/24 03:22:25      Final result by Khris Anna MD (07/28/24 03:22:25)                   Impression:      Suspected mild infrahilar and basilar interstitial infiltrates with superimposed suspected mild alveolar infiltrate at the right lung base, with minimal pleural fluid on the right.      Electronically signed by: Khris Anna  Date:    07/28/2024  Time:    03:22               Narrative:    EXAMINATION:  XR CHEST AP PORTABLE    CLINICAL HISTORY:  Sepsis;    TECHNIQUE:  Single frontal view of the chest was performed.    COMPARISON:  Chest radiograph March 4, 2024    FINDINGS:  Single portable chest view is submitted.  The cardiomediastinal silhouette appears appropriate.    Minimal linear density at the left lower chest may relate to mild atelectasis.  There is mild accentuation of the interstitial markings about the infrahilar region and lung bases that may relate to mild interstitial infiltrate, mild hazy opacity at the right lung base medially may relate to mild component of alveolar infiltrate.  There is also minimal blunting at the right costophrenic angle, this may relate to a small amount of pleural fluid.    There is no large pleural effusion and there is no pneumothorax.  The osseous structures appear intact.

## 2024-07-28 NOTE — ASSESSMENT & PLAN NOTE
History of pulmonary nodule   Greater than 40 pack-year cigarette smoking, still smokes  He's not ready to quit at this time  Follow-up with pulmonology at discharge.

## 2024-07-28 NOTE — ASSESSMENT & PLAN NOTE
Patient's FSGs are uncontrolled due to hyperglycemia on current medication regimen.  Last A1c reviewed-   Lab Results   Component Value Date    HGBA1C 9.2 (H) 07/16/2024     Most recent fingerstick glucose reviewed-   Recent Labs   Lab 07/28/24  0457 07/28/24  0600   POCTGLUCOSE 485* 423*     Current correctional scale  Medium  Maintain anti-hyperglycemic dose as follows-   Antihyperglycemics (From admission, onward)      Start     Stop Route Frequency Ordered    07/28/24 0900  insulin glargine U-100 (Lantus) pen 12 Units         -- SubQ Daily 07/28/24 0550    07/28/24 0715  insulin aspart U-100 pen 5 Units         -- SubQ 3 times daily with meals 07/28/24 0550    07/28/24 0649  insulin aspart U-100 pen 0-10 Units         -- SubQ Before meals & nightly PRN 07/28/24 0550          Hold Oral hypoglycemics while patient is in the hospital.  Will volume resuscitate as well.

## 2024-07-28 NOTE — PLAN OF CARE
.Case Management Assessment     PCP: VILMA Flores   Pharmacy: [facility]    Patient Arrived From: Home  Existing Help at Home: Mother & Sister     Barriers to Discharge: None    Discharge Plan:    A. Home with Family; Possible HH   B. TBD    Discussed Discharge Planning with pts. MotherMarilou who reports pt. Is Total Care; requiring assistance with ADL's. Mother states that pts sister takes care of pt in the afternoon and she cares for him in the morning. She reports pt. would benefit from walker and/or home care and is requesting assistance with that if possible.  Family to drive pt. Home upon discharge.   Although not reported, in assessment, pt. Does use Nebulizer & CPAP at home.    07/28/24 1433   Discharge Assessment   Assessment Type Discharge Planning Assessment   Confirmed/corrected address, phone number and insurance Yes   Confirmed Demographics Correct on Facesheet   Source of Information family  (Mother)   If unable to respond/provide information was family/caregiver contacted? Yes   Contact Name/Number Cristian Zamarripa [phone]   Does patient/caregiver understand observation status Yes   Communicated ROLA with patient/caregiver Yes   Reason For Admission COPD Excerbation   People in Home parent(s)   Facility Arrived From: Home   Do you expect to return to your current living situation? Yes   Do you have help at home or someone to help you manage your care at home? Yes   Who are your caregiver(s) and their phone number(s)? Mother- Marilou [phone] &/or Sister- Amarilis [phone]   Prior to hospitilization cognitive status: Alert/Oriented   Current cognitive status: Not Oriented to Place;Not Oriented to Time   Walking or Climbing Stairs Difficulty yes   Walking or Climbing Stairs ambulation difficulty, assistance 1 person   Mobility Management Mother &/or Sister assists   Dressing/Bathing Difficulty yes   Dressing/Bathing bathing difficulty, assistance 1 person   Dressing/Bathing Management  Mother & Sister assists   Specify other help Mother & Sister provide total care   Home Accessibility wheelchair accessible   Home Layout Able to live on 1st floor   Equipment Currently Used at Home none   Readmission within 30 days? No   Patient currently being followed by outpatient case management? No   Do you currently have service(s) that help you manage your care at home? No  (Mother reports pt. has had HH briefly in the past, but doesn't recall which company.)   Do you take prescription medications? Yes   Do you have prescription coverage? Yes   Coverage [facility] Managed Medicare   Do you have any problems affording any of your prescribed medications? No   Is the patient taking medications as prescribed? yes   Who is going to help you get home at discharge? Mother & Sister   How do you get to doctors appointments? family or friend will provide   Are you on dialysis? No   Do you take coumadin? No   Discharge Plan A Home with family  (Possible HH Services)   Discharge Plan B Home Health  (TBD)   DME Needed Upon Discharge  other (see comments)  (May need DME for ambulation)   Discharge Plan discussed with: Parent(s)   Name(s) and Number(s) Cristian Zamarripa [phone]   Transition of Care Barriers None   OTHER   Name(s) of People in Home Cristian Zamarripa

## 2024-07-28 NOTE — ASSESSMENT & PLAN NOTE
Patient is identified as having Diastolic (HFpEF) heart failure that is Chronic. CHF is currently controlled.   He does not appear to be in exacerbation at this time, BNP 30.  Shortness a breath likely due to COPD exacerbation  Latest ECHO performed and demonstrates- Results for orders placed during the hospital encounter of 08/15/23    Echo Saline Bubble? Yes    Interpretation Summary    Left Ventricle: The left ventricle is normal in size. Normal wall thickness. There is concentric remodeling. Normal wall motion. There is normal systolic function with a visually estimated ejection fraction of 55 - 60%. There is diastolic dysfunction.    Right Ventricle: Normal right ventricular cavity size. Wall thickness is normal. Right ventricle wall motion  is normal. Systolic function is normal.    Left Atrium: Agitated saline study of the atrial septum is negative, suggesting absence of intracardiac shunt at the atrial level.    No intracardiac source of embolus noted on this exam.  If high clinical suspicion, consider FREYA +/- bubble study.  . Continue Beta Blocker and ACE/ARB and monitor clinical status closely. Monitor on telemetry. Patient is off CHF pathway.  Monitor strict Is&Os and daily weights.  Place on fluid restriction of 2 L. Cardiology has not been consulted. Continue to stress to patient importance of self efficacy and  on diet for CHF. Last BNP reviewed- and noted below   Recent Labs   Lab 07/28/24  0253   BNP 30

## 2024-07-29 VITALS
DIASTOLIC BLOOD PRESSURE: 66 MMHG | BODY MASS INDEX: 18.34 KG/M2 | SYSTOLIC BLOOD PRESSURE: 128 MMHG | HEART RATE: 100 BPM | TEMPERATURE: 98 F | OXYGEN SATURATION: 96 % | RESPIRATION RATE: 18 BRPM | WEIGHT: 135.38 LBS | HEIGHT: 72 IN

## 2024-07-29 LAB
ANION GAP SERPL CALC-SCNC: 11 MMOL/L (ref 8–16)
BASOPHILS # BLD AUTO: 0.02 K/UL (ref 0–0.2)
BASOPHILS NFR BLD: 0.2 % (ref 0–1.9)
BUN SERPL-MCNC: 20 MG/DL (ref 6–20)
CALCIUM SERPL-MCNC: 8.9 MG/DL (ref 8.7–10.5)
CHLORIDE SERPL-SCNC: 104 MMOL/L (ref 95–110)
CO2 SERPL-SCNC: 20 MMOL/L (ref 23–29)
CREAT SERPL-MCNC: 0.9 MG/DL (ref 0.5–1.4)
DIFFERENTIAL METHOD BLD: ABNORMAL
EOSINOPHIL # BLD AUTO: 0.1 K/UL (ref 0–0.5)
EOSINOPHIL NFR BLD: 0.4 % (ref 0–8)
ERYTHROCYTE [DISTWIDTH] IN BLOOD BY AUTOMATED COUNT: 14.4 % (ref 11.5–14.5)
EST. GFR  (NO RACE VARIABLE): >60 ML/MIN/1.73 M^2
GLUCOSE SERPL-MCNC: 342 MG/DL (ref 70–110)
HCT VFR BLD AUTO: 35.8 % (ref 40–54)
HGB BLD-MCNC: 10.8 G/DL (ref 14–18)
IMM GRANULOCYTES # BLD AUTO: 0.08 K/UL (ref 0–0.04)
IMM GRANULOCYTES NFR BLD AUTO: 0.7 % (ref 0–0.5)
LYMPHOCYTES # BLD AUTO: 1.3 K/UL (ref 1–4.8)
LYMPHOCYTES NFR BLD: 10.5 % (ref 18–48)
MCH RBC QN AUTO: 24.4 PG (ref 27–31)
MCHC RBC AUTO-ENTMCNC: 30.2 G/DL (ref 32–36)
MCV RBC AUTO: 81 FL (ref 82–98)
MONOCYTES # BLD AUTO: 1.2 K/UL (ref 0.3–1)
MONOCYTES NFR BLD: 10.2 % (ref 4–15)
NEUTROPHILS # BLD AUTO: 9.5 K/UL (ref 1.8–7.7)
NEUTROPHILS NFR BLD: 78 % (ref 38–73)
NRBC BLD-RTO: 0 /100 WBC
PLATELET # BLD AUTO: 329 K/UL (ref 150–450)
PMV BLD AUTO: 10.3 FL (ref 9.2–12.9)
POCT GLUCOSE: 367 MG/DL (ref 70–110)
POCT GLUCOSE: 96 MG/DL (ref 70–110)
POTASSIUM SERPL-SCNC: 4.4 MMOL/L (ref 3.5–5.1)
RBC # BLD AUTO: 4.42 M/UL (ref 4.6–6.2)
SODIUM SERPL-SCNC: 135 MMOL/L (ref 136–145)
WBC # BLD AUTO: 12.2 K/UL (ref 3.9–12.7)

## 2024-07-29 PROCEDURE — 80048 BASIC METABOLIC PNL TOTAL CA: CPT | Performed by: STUDENT IN AN ORGANIZED HEALTH CARE EDUCATION/TRAINING PROGRAM

## 2024-07-29 PROCEDURE — 63700000 PHARM REV CODE 250 ALT 637 W/O HCPCS: Performed by: STUDENT IN AN ORGANIZED HEALTH CARE EDUCATION/TRAINING PROGRAM

## 2024-07-29 PROCEDURE — 27000221 HC OXYGEN, UP TO 24 HOURS

## 2024-07-29 PROCEDURE — 25000003 PHARM REV CODE 250: Performed by: STUDENT IN AN ORGANIZED HEALTH CARE EDUCATION/TRAINING PROGRAM

## 2024-07-29 PROCEDURE — 94761 N-INVAS EAR/PLS OXIMETRY MLT: CPT

## 2024-07-29 PROCEDURE — 63600175 PHARM REV CODE 636 W HCPCS: Performed by: STUDENT IN AN ORGANIZED HEALTH CARE EDUCATION/TRAINING PROGRAM

## 2024-07-29 PROCEDURE — 99900035 HC TECH TIME PER 15 MIN (STAT)

## 2024-07-29 PROCEDURE — 85025 COMPLETE CBC W/AUTO DIFF WBC: CPT | Performed by: STUDENT IN AN ORGANIZED HEALTH CARE EDUCATION/TRAINING PROGRAM

## 2024-07-29 PROCEDURE — 94640 AIRWAY INHALATION TREATMENT: CPT

## 2024-07-29 PROCEDURE — 25000242 PHARM REV CODE 250 ALT 637 W/ HCPCS: Performed by: STUDENT IN AN ORGANIZED HEALTH CARE EDUCATION/TRAINING PROGRAM

## 2024-07-29 PROCEDURE — 36415 COLL VENOUS BLD VENIPUNCTURE: CPT | Performed by: STUDENT IN AN ORGANIZED HEALTH CARE EDUCATION/TRAINING PROGRAM

## 2024-07-29 RX ORDER — INSULIN GLARGINE 100 [IU]/ML
15 INJECTION, SOLUTION SUBCUTANEOUS ONCE
Status: COMPLETED | OUTPATIENT
Start: 2024-07-29 | End: 2024-07-29

## 2024-07-29 RX ORDER — PREDNISONE 20 MG/1
40 TABLET ORAL DAILY
Qty: 8 TABLET | Refills: 0 | Status: SHIPPED | OUTPATIENT
Start: 2024-07-30 | End: 2024-08-03

## 2024-07-29 RX ORDER — AMOXICILLIN AND CLAVULANATE POTASSIUM 875; 125 MG/1; MG/1
1 TABLET, FILM COATED ORAL EVERY 12 HOURS
Qty: 8 TABLET | Refills: 0 | Status: SHIPPED | OUTPATIENT
Start: 2024-07-29 | End: 2024-08-02

## 2024-07-29 RX ORDER — ALBUTEROL SULFATE 90 UG/1
2 AEROSOL, METERED RESPIRATORY (INHALATION) EVERY 6 HOURS PRN
Qty: 18 G | Refills: 0 | Status: SHIPPED | OUTPATIENT
Start: 2024-07-29 | End: 2025-07-29

## 2024-07-29 RX ORDER — GUAIFENESIN 100 MG/5ML
200 SOLUTION ORAL EVERY 4 HOURS PRN
Qty: 118 ML | Refills: 0 | Status: SHIPPED | OUTPATIENT
Start: 2024-07-29 | End: 2024-08-08

## 2024-07-29 RX ORDER — AZITHROMYCIN 250 MG/1
250 TABLET, FILM COATED ORAL DAILY
Qty: 4 TABLET | Refills: 0 | Status: SHIPPED | OUTPATIENT
Start: 2024-07-29 | End: 2024-08-02

## 2024-07-29 RX ORDER — INSULIN GLARGINE 100 [IU]/ML
30 INJECTION, SOLUTION SUBCUTANEOUS 2 TIMES DAILY
Status: DISCONTINUED | OUTPATIENT
Start: 2024-07-29 | End: 2024-07-29 | Stop reason: HOSPADM

## 2024-07-29 RX ADMIN — THIAMINE HCL TAB 100 MG 100 MG: 100 TAB at 08:07

## 2024-07-29 RX ADMIN — PREDNISONE 40 MG: 20 TABLET ORAL at 08:07

## 2024-07-29 RX ADMIN — VENLAFAXINE HYDROCHLORIDE 75 MG: 75 CAPSULE, EXTENDED RELEASE ORAL at 08:07

## 2024-07-29 RX ADMIN — INSULIN GLARGINE 15 UNITS: 100 INJECTION, SOLUTION SUBCUTANEOUS at 12:07

## 2024-07-29 RX ADMIN — INSULIN GLARGINE 20 UNITS: 100 INJECTION, SOLUTION SUBCUTANEOUS at 08:07

## 2024-07-29 RX ADMIN — ASPIRIN 81 MG CHEWABLE TABLET 81 MG: 81 TABLET CHEWABLE at 08:07

## 2024-07-29 RX ADMIN — AZITHROMYCIN DIHYDRATE 500 MG: 250 TABLET ORAL at 08:07

## 2024-07-29 RX ADMIN — ATORVASTATIN CALCIUM 20 MG: 10 TABLET, FILM COATED ORAL at 08:07

## 2024-07-29 RX ADMIN — THERA TABS 1 TABLET: TAB at 08:07

## 2024-07-29 RX ADMIN — NIFEDIPINE 60 MG: 30 TABLET, FILM COATED, EXTENDED RELEASE ORAL at 08:07

## 2024-07-29 RX ADMIN — INSULIN ASPART 10 UNITS: 100 INJECTION, SOLUTION INTRAVENOUS; SUBCUTANEOUS at 08:07

## 2024-07-29 RX ADMIN — IPRATROPIUM BROMIDE AND ALBUTEROL SULFATE 3 ML: 2.5; .5 SOLUTION RESPIRATORY (INHALATION) at 07:07

## 2024-07-29 RX ADMIN — MUPIROCIN: 20 OINTMENT TOPICAL at 08:07

## 2024-07-29 RX ADMIN — IPRATROPIUM BROMIDE AND ALBUTEROL SULFATE 3 ML: 2.5; .5 SOLUTION RESPIRATORY (INHALATION) at 01:07

## 2024-07-29 RX ADMIN — LOSARTAN POTASSIUM 100 MG: 25 TABLET, FILM COATED ORAL at 12:07

## 2024-07-29 RX ADMIN — CEFTRIAXONE 1 G: 1 INJECTION, POWDER, FOR SOLUTION INTRAMUSCULAR; INTRAVENOUS at 04:07

## 2024-07-29 RX ADMIN — FOLIC ACID 1 MG: 1 TABLET ORAL at 08:07

## 2024-07-29 NOTE — PLAN OF CARE
Problem: COPD (Chronic Obstructive Pulmonary Disease)  Goal: Optimal Chronic Illness Coping  7/29/2024 0202 by Paulina Holbrook RN  Outcome: Progressing  7/29/2024 0202 by Paulina Holbrook RN  Outcome: Progressing  Goal: Optimal Level of Functional [place]  7/29/2024 0202 by Paulina Holbrook RN  Outcome: Progressing  7/29/2024 0202 by Paulina Holbrook RN  Outcome: Progressing  Goal: Absence of Infection Signs and Symptoms  7/29/2024 0202 by Paulina Holbrook RN  Outcome: Progressing  7/29/2024 0202 by Paulina Holbrook RN  Outcome: Progressing  Goal: Improved Oral Intake  7/29/2024 0202 by Paulina Holbrook RN  Outcome: Progressing  7/29/2024 0202 by Paulina Holbrook RN  Outcome: Progressing  Goal: Effective Oxygenation and Ventilation  7/29/2024 0202 by Paulina Holbrook RN  Outcome: Progressing  7/29/2024 0202 by Paulina Holbrook RN  Outcome: Progressing     Problem: Diabetes Comorbidity  Goal: Blood Glucose Level Within Targeted Range  7/29/2024 0202 by Paulina Holbrook RN  Outcome: Progressing  7/29/2024 0202 by Paulina Holbrook RN  Outcome: Progressing

## 2024-07-29 NOTE — PLAN OF CARE
Problem: COPD (Chronic Obstructive Pulmonary Disease)  Goal: Optimal Chronic Illness Coping  Outcome: Met  Goal: Optimal Level of Functional [place]  Outcome: Met  Goal: Absence of Infection Signs and Symptoms  Outcome: Met  Goal: Improved Oral Intake  Outcome: Met  Goal: Effective Oxygenation and Ventilation  Outcome: Met     Problem: Adult Inpatient Plan of Care  Goal: Plan of Care Review  Outcome: Met  Goal: Patient-Specific Goal (Individualized)  Outcome: Met  Goal: Absence of Hospital-Acquired Illness or Injury  Outcome: Met  Goal: Optimal Comfort and Wellbeing  Outcome: Met  Goal: Readiness for Transition of Care  Outcome: Met     Problem: Diabetes Comorbidity  Goal: Blood Glucose Level Within Targeted Range  Outcome: Met     Problem: Skin Injury Risk Increased  Goal: Skin Health and Integrity  Outcome: Met

## 2024-07-29 NOTE — NURSING
[facility], [facility] [place]  Nurses Note -- 4 Eyes      7/29/2024       Skin assessed on: Q Shift      [x] No Pressure Injuries Present    [x]Prevention Measures Documented    [] Yes LDA  for Pressure Injury Previously documented     [] Yes New Pressure Injury Discovered   [] LDA for New Pressure Injury Added      Attending RN:  Quincy Leiva, RN     Second RN:  Paulina MULLINS RN

## 2024-07-29 NOTE — NURSING
[facility], [facility] [place]  Nurses Note -- 4 Eyes      7/29/2024       Skin assessed on: Q Shift      [x] No Pressure Injuries Present    [x]Prevention Measures Documented    [] Yes LDA  for Pressure Injury Previously documented     [] Yes New Pressure Injury Discovered   [] LDA for New Pressure Injury Added      Attending RN:  Paulina Holbrook RN     Second RN:  Belinda Gonsalez RN

## 2024-07-29 NOTE — PLAN OF CARE
Case Management Final Discharge Note      Discharge Disposition: home    New DME ordered / company name: [employer]    Relevant SDOH / Transition of Care Barriers:  none    Primary Caretaker and contact information: Marilou coles (Mother)  [phone] (Home Phone)     Scheduled followup appointment: see chart    Referrals placed: sent    Transportation: ADT 30 order placed for Stretcher Transportation.  Requested  time:2:30 PM requested ambulance stretcher to home  If transportation does not arrive at ETA time nurse will be instructed to follow protocol for transportation below:  How can I get in touch directly with dispatch, if needed?                 Non-emergent (stretcher): [phone]  option 6     ++NURSING:  If Stretcher does not arrive at requested time please call the above Non Emergent Dispatcher.  If issue not resolved please escalate to your charge nurse for further instructions.        Patient and family educated on discharge services and updated on DC plan.Quincy/ Bedside RN notified, patient clear to discharge from Case Management Perspective.    07/29/24 1404   Final Note   Assessment Type Final Discharge Note   Anticipated Discharge Disposition Home   What phone number can be called within the next 1-3 days to see how you are doing after discharge?   (see chart)   Hospital Resources/Appts/Education Provided Provided patient/caregiver with written discharge plan information   Post-Acute Status   Coverage [facility] DUAL COMPLETE HMO SNP   Discharge Delays None known at this time

## 2024-07-30 LAB — BACTERIA UR CULT: NORMAL

## 2024-07-30 NOTE — DISCHARGE SUMMARY
[facility] Medicine  Discharge Summary      Patient Name: Cali Mabry  MRN: [id number]  ZINA: [id number]  Patient Class: IP- Inpatient  Admission Date: 7/28/2024  Hospital Length of Stay: 1 days  Discharge Date and Time: 7/29/2024 11:45 AM  Attending Physician: No att. providers found   Discharging Provider: Mahamed Garcia MD  Primary Care Provider: Lawanda Flores NP    Primary Care Team: Networked reference to record PCT     HPI:   58-year-old male with medical history significant for cerebrovascular accident with mild residual right-sided weakness, chronic obstructive pulmonary disease, type 1 diabetes mellitus, hepatitis-C infection, hypertension, polysubstance abuse- methamphetamine, heroin and tobacco use disorder with 40 pack-year cigarette smoking who was brought to the emergency department by EMS after being called for worsening shortness a breath, per EMS he was saturating 71% on room air upon arrival.  For which he was started on CPAP, given 125 mg of Solu-Medrol, nebulized with DuoNebs, 2 mg of magnesium sulfate and 0.3 epi enroute emergency room.  He came in on CPAP, with VBG obtained showing pH of 7.26, pCO2 of 69.8, 0 2 of 27, with bicarb of 30.3, he could had with of the who wheezing, with use of accessory muscle of respiration, voiced associated generalized weakness.  He denied fever, chills or rigors, no chest pain, palpitation, orthopnea or paroxysmal nocturnal dyspnea.  He also denied urinary symptoms of dysuria, frequency, urgency, straining at micturition or hematuria.  He has not had any nausea, vomiting, change in bowel habits.  Denied recent fall, no headache, or lightheadedness  He currently lives with his mom.  Lab obtained in the emergency room showed H&H of 13/43, with WBC of 9.71.  Random blood glucose elevated at 458, BUN/creatinine of 12/1.2, serum bicarbonate of 22, anion gap of 14.  Troponin within normal limits at 0.008, BNP was 30.  Chest x-ray  "obtained showed " suspected mild infrahilar and basilar interstitial infiltrates with superimposed suspected mild alveolar infiltrate at the right lung base, with minimal pleural fluid on the right".  Was started on ceftriaxone and azithromycin in the emergency room.    * No surgery found *      Hospital Course:   Mr. Mabry is a 57 yo M who was admitted with acute respiratory failure secondary to pneumonia and COPD exacerbation. He was started on steroids, IV antibiotics and breathing treatments. Oxygen weaned to room air saturating 96% on room air. No further wheezing and shortness of breath improving. Vitals stable and glucose better controlled. Overall, he was doing well and stable for discharge home. He will be discharged home to complete steroid course and antibiotic course. Prescriptions sent and reviewed with family. Patient discharged home. He will need PCP follow up and Pulmonology referral placed.     Goals of Care Treatment Preferences:  Code Status: Full Code      Consults:   Consults (From admission, onward)          Status Ordering Provider     Inpatient consult to Pulmonology  Once        Provider:  Chiki Soler MD    Completed INDIRA RINCON            No new Assessment & Plan notes have been filed under this hospital service since the last note was generated.  Service: Hospital Medicine    Final Active Diagnoses:    Diagnosis Date Noted POA    PRINCIPAL PROBLEM:  COPD exacerbation [J44.1] 08/08/2020 Yes    Hepatitis C infection [B19.20] 07/28/2024 Yes    Congestive heart failure [I50.9] 07/28/2024 Yes    Bipolar disorder [F31.9] 08/30/2023 Yes     Chronic    Polysubstance dependence including opioid type drug, continuous use [F11.20, F19.20] 09/19/2022 Yes    Type 1 diabetes mellitus with hyperglycemia [E10.65] 05/02/2021 Yes    CAD (coronary artery disease) [I25.10] 08/13/2020 Yes     Chronic    Moderate protein malnutrition [E44.0] 04/23/2020 Yes    Pulmonary nodule [R91.1] 04/13/2019 " Yes    Opioid abuse [F11.10] 08/05/2018 Yes     Chronic    Tobacco use disorder, moderate, dependence [F17.200] 11/23/2017 Yes     Chronic    Hemiparesis affecting right side as late effect of stroke [I69.351] 11/23/2017 Not Applicable    Primary hypertension [I10] 04/03/2016 Yes      Problems Resolved During this Admission:    Diagnosis Date Noted Date Resolved POA    Polysubstance use disorder [F19.90] 03/12/2019 07/28/2024 Yes    Marijuana abuse [F12.10] 08/05/2018 07/28/2024 Yes    Anemia of chronic disease [D63.8] 04/03/2016 07/28/2024 Yes     Chronic       Discharged Condition: stable    Disposition: Home or Self Care    Follow Up:   Follow-up Information       Lawanda Flores NP Follow up.    Specialty: Family Medicine  Why: Office will call the patient.  Contact information:  [address]  [address]  [place] [address]  [phone]                           Patient Instructions:      Ambulatory referral/consult to Pulmonology   Standing Status: Future   Referral Priority: Routine Referral Type: Consultation   Referral Reason: Specialty Services Required   Requested Specialty: Pulmonary Disease   Number of Visits Requested: 1     Diet diabetic     Notify your health care provider if you experience any of the following:  temperature >100.4     Notify your health care provider if you experience any of the following:  persistent nausea and vomiting or diarrhea     Notify your health care provider if you experience any of the following:  severe uncontrolled pain     Notify your health care provider if you experience any of the following:  difficulty breathing or increased cough     Notify your health care provider if you experience any of the following:  severe persistent headache     Notify your health care provider if you experience any of the following:  worsening rash     Notify your health care provider if you experience any of the following:  persistent dizziness, light-headedness, or visual  "disturbances     Notify your health care provider if you experience any of the following:  increased confusion or weakness     Activity as tolerated       Significant Diagnostic Studies: Labs: All labs within the past 24 hours have been reviewed    Pending Diagnostic Studies:       None           Medications:  Reconciled Home Medications:      Medication List        START taking these medications      amoxicillin-clavulanate 875-125mg 875-125 mg per tablet  Commonly known as: AUGMENTIN  Take 1 tablet by mouth every 12 (twelve) hours. for 4 days     azithromycin 250 MG tablet  Commonly known as: Z-IRASEMA  Take 1 tablet (250 mg total) by mouth once daily. for 4 days     guaiFENesin 100 mg/5 ml 100 mg/5 mL syrup  Commonly known as: ROBITUSSIN  Take 10 mLs (200 mg total) by mouth every 4 (four) hours as needed for Congestion or Cough.     predniSONE 20 MG tablet  Commonly known as: DELTASONE  Take 2 tablets (40 mg total) by mouth once daily. for 4 days            CHANGE how you take these medications      albuterol 90 mcg/actuation inhaler  Commonly known as: PROVENTIL HFA  Inhale 2 puffs into the lungs every 6 (six) hours as needed for Wheezing. Rescue  What changed: how much to take     carBAMazepine 200 mg tablet  Commonly known as: TEGRETOL  Take 2 tablets (400 mg total) by mouth every evening.  What changed: Another medication with the same name was removed. Continue taking this medication, and follow the directions you see here.            CONTINUE taking these medications      aspirin 81 MG Chew  Take 1 tablet (81 mg total) by mouth once daily.     atorvastatin 20 MG tablet  Commonly known as: LIPITOR  Take 1 tablet (20 mg total) by mouth once daily.     BD GERTRUDIS 2ND GEN PEN NEEDLE 32 gauge x 5/32" Ndle  Generic drug: pen needle, diabetic  Inject into the skin 4 (four) times daily.     folic acid 1 MG tablet  Commonly known as: FOLVITE  Take 1 tablet (1 mg total) by mouth once daily.     FREESTYLE SEBASTIAN 2 SENSOR " Kit  Generic drug: flash glucose sensor  USE FOUR TIMES PER WEEK AS DIRECTED     insulin detemir U-100 (Levemir) 100 unit/mL (3 mL) Inpn pen  Inject 10 Units into the skin 2 (two) times daily. Inject 10 units under the skin in the morning and 10 units in the evening.     insulin lispro 100 unit/mL pen  Inject 5 Units into the skin 3 (three) times daily before meals.     levocetirizine 5 MG tablet  Commonly known as: XYZAL  Take 5 mg by mouth every evening.     losartan 100 MG tablet  Commonly known as: COZAAR  Take 1 tablet (100 mg total) by mouth once daily.     naloxone 4 mg/actuation Spry  Commonly known as: NARCAN  4mg by nasal route as needed for opioid overdose; may repeat every 2-3 minutes in alternating nostrils until medical help arrives. Call 911     NIFEdipine 60 MG (OSM) 24 hr tablet  Commonly known as: PROCARDIA-XL  Take 1 tablet (60 mg total) by mouth once daily.     thiamine 100 MG tablet  Take 1 tablet (100 mg total) by mouth once daily.     venlafaxine 75 MG 24 hr capsule  Commonly known as: EFFEXOR-XR  Take 1 capsule (75 mg total) by mouth once daily.              Indwelling Lines/Drains at time of discharge:   Lines/Drains/Airways       None                   Time spent on the discharge of patient: >35 minutes         Mahamed Garcia MD  Department of Hospital Medicine  [facility]

## 2024-07-30 NOTE — HOSPITAL COURSE
Mr. Mabry is a 59 yo M who was admitted with acute respiratory failure secondary to pneumonia and COPD exacerbation. He was started on steroids, IV antibiotics and breathing treatments. Oxygen weaned to room air saturating 96% on room air. No further wheezing and shortness of breath improving. Vitals stable and glucose better controlled. Overall, he was doing well and stable for discharge home. He will be discharged home to complete steroid course and antibiotic course. Prescriptions sent and reviewed with family. Patient discharged home. He will need PCP follow up and Pulmonology referral placed.

## 2024-08-01 LAB
BACTERIA BLD CULT: NORMAL
BACTERIA BLD CULT: NORMAL

## 2024-08-02 ENCOUNTER — PATIENT OUTREACH (OUTPATIENT)
Dept: ADMINISTRATIVE | Facility: CLINIC | Age: 59
End: 2024-08-02
Payer: MEDICARE

## 2024-08-02 NOTE — PROGRESS NOTES
C3 nurse attempted to contact Cali Mabry  for a TCC post hospital discharge follow up call. No answer. The patient does not have a scheduled HOSFU appointment, and the pt does not have an [facility] PCP.

## 2024-08-05 ENCOUNTER — PATIENT MESSAGE (OUTPATIENT)
Dept: ADMINISTRATIVE | Facility: CLINIC | Age: 59
End: 2024-08-05
Payer: MEDICARE

## 2024-08-05 DIAGNOSIS — J44.1 COPD EXACERBATION: Primary | ICD-10-CM

## 2024-08-28 ENCOUNTER — HOSPITAL ENCOUNTER (EMERGENCY)
Facility: HOSPITAL | Age: 59
Discharge: HOME | End: 2024-08-28
Attending: EMERGENCY MEDICINE
Payer: MEDICARE

## 2024-08-28 VITALS
BODY MASS INDEX: 18.9 KG/M2 | HEART RATE: 93 BPM | TEMPERATURE: 99 F | SYSTOLIC BLOOD PRESSURE: 145 MMHG | OXYGEN SATURATION: 97 % | WEIGHT: 135 LBS | DIASTOLIC BLOOD PRESSURE: 78 MMHG | RESPIRATION RATE: 22 BRPM | HEIGHT: 71 IN

## 2024-08-28 DIAGNOSIS — F41.9 ANXIETY: Primary | ICD-10-CM

## 2024-08-28 DIAGNOSIS — R06.02 SHORTNESS OF BREATH: ICD-10-CM

## 2024-08-28 LAB
ALBUMIN SERPL BCP-MCNC: 3.4 G/DL (ref 3.5–5.2)
ALP SERPL-CCNC: 94 U/L (ref 55–135)
ALT SERPL W/O P-5'-P-CCNC: 14 U/L (ref 10–44)
ANION GAP SERPL CALC-SCNC: 9 MMOL/L (ref 8–16)
APAP SERPL-MCNC: <3 UG/ML (ref 10–20)
AST SERPL-CCNC: 14 U/L (ref 10–40)
BASOPHILS # BLD AUTO: 0.03 K/UL (ref 0–0.2)
BASOPHILS NFR BLD: 0.3 % (ref 0–1.9)
BILIRUB SERPL-MCNC: 0.3 MG/DL (ref 0.1–1)
BNP SERPL-MCNC: 26 PG/ML (ref 0–99)
BUN SERPL-MCNC: 20 MG/DL (ref 6–20)
CALCIUM SERPL-MCNC: 9.2 MG/DL (ref 8.7–10.5)
CHLORIDE SERPL-SCNC: 103 MMOL/L (ref 95–110)
CO2 SERPL-SCNC: 25 MMOL/L (ref 23–29)
CREAT SERPL-MCNC: 0.9 MG/DL (ref 0.5–1.4)
CTP QC/QA: YES
DIFFERENTIAL METHOD BLD: ABNORMAL
EOSINOPHIL # BLD AUTO: 0.6 K/UL (ref 0–0.5)
EOSINOPHIL NFR BLD: 7 % (ref 0–8)
ERYTHROCYTE [DISTWIDTH] IN BLOOD BY AUTOMATED COUNT: 14.7 % (ref 11.5–14.5)
EST. GFR  (NO RACE VARIABLE): >60 ML/MIN/1.73 M^2
ETHANOL SERPL-MCNC: <10 MG/DL
GLUCOSE SERPL-MCNC: 239 MG/DL (ref 70–110)
HCT VFR BLD AUTO: 36.4 % (ref 40–54)
HGB BLD-MCNC: 11.4 G/DL (ref 14–18)
IMM GRANULOCYTES # BLD AUTO: 0.04 K/UL (ref 0–0.04)
IMM GRANULOCYTES NFR BLD AUTO: 0.5 % (ref 0–0.5)
LYMPHOCYTES # BLD AUTO: 1.3 K/UL (ref 1–4.8)
LYMPHOCYTES NFR BLD: 14.8 % (ref 18–48)
MCH RBC QN AUTO: 24.2 PG (ref 27–31)
MCHC RBC AUTO-ENTMCNC: 31.3 G/DL (ref 32–36)
MCV RBC AUTO: 77 FL (ref 82–98)
MONOCYTES # BLD AUTO: 0.9 K/UL (ref 0.3–1)
MONOCYTES NFR BLD: 9.9 % (ref 4–15)
NEUTROPHILS # BLD AUTO: 5.9 K/UL (ref 1.8–7.7)
NEUTROPHILS NFR BLD: 67.5 % (ref 38–73)
NRBC BLD-RTO: 0 /100 WBC
PLATELET # BLD AUTO: 347 K/UL (ref 150–450)
PMV BLD AUTO: 9.5 FL (ref 9.2–12.9)
POCT GLUCOSE: 230 MG/DL (ref 70–110)
POTASSIUM SERPL-SCNC: 4 MMOL/L (ref 3.5–5.1)
PROT SERPL-MCNC: 6.9 G/DL (ref 6–8.4)
RBC # BLD AUTO: 4.72 M/UL (ref 4.6–6.2)
SARS-COV-2 RDRP RESP QL NAA+PROBE: NEGATIVE
SODIUM SERPL-SCNC: 137 MMOL/L (ref 136–145)
TROPONIN I SERPL DL<=0.01 NG/ML-MCNC: <0.006 NG/ML (ref 0–0.03)
TSH SERPL DL<=0.005 MIU/L-ACNC: 0.41 UIU/ML (ref 0.4–4)
WBC # BLD AUTO: 8.71 K/UL (ref 3.9–12.7)

## 2024-08-28 PROCEDURE — 99285 EMERGENCY DEPT VISIT HI MDM: CPT | Mod: 25

## 2024-08-28 PROCEDURE — 80053 COMPREHEN METABOLIC PANEL: CPT | Performed by: EMERGENCY MEDICINE

## 2024-08-28 PROCEDURE — 25000242 PHARM REV CODE 250 ALT 637 W/ HCPCS: Performed by: EMERGENCY MEDICINE

## 2024-08-28 PROCEDURE — 93005 ELECTROCARDIOGRAM TRACING: CPT

## 2024-08-28 PROCEDURE — 82962 GLUCOSE BLOOD TEST: CPT

## 2024-08-28 PROCEDURE — 84443 ASSAY THYROID STIM HORMONE: CPT | Performed by: EMERGENCY MEDICINE

## 2024-08-28 PROCEDURE — 80143 DRUG ASSAY ACETAMINOPHEN: CPT | Performed by: EMERGENCY MEDICINE

## 2024-08-28 PROCEDURE — 83880 ASSAY OF NATRIURETIC PEPTIDE: CPT | Performed by: EMERGENCY MEDICINE

## 2024-08-28 PROCEDURE — 87635 SARS-COV-2 COVID-19 AMP PRB: CPT | Performed by: EMERGENCY MEDICINE

## 2024-08-28 PROCEDURE — 82077 ASSAY SPEC XCP UR&BREATH IA: CPT | Performed by: EMERGENCY MEDICINE

## 2024-08-28 PROCEDURE — 85025 COMPLETE CBC W/AUTO DIFF WBC: CPT | Performed by: EMERGENCY MEDICINE

## 2024-08-28 PROCEDURE — 94640 AIRWAY INHALATION TREATMENT: CPT

## 2024-08-28 PROCEDURE — 94761 N-INVAS EAR/PLS OXIMETRY MLT: CPT

## 2024-08-28 PROCEDURE — 84484 ASSAY OF TROPONIN QUANT: CPT | Performed by: EMERGENCY MEDICINE

## 2024-08-28 PROCEDURE — 93010 ELECTROCARDIOGRAM REPORT: CPT | Mod: ,,, | Performed by: INTERNAL MEDICINE

## 2024-08-28 RX ORDER — IPRATROPIUM BROMIDE AND ALBUTEROL SULFATE 2.5; .5 MG/3ML; MG/3ML
3 SOLUTION RESPIRATORY (INHALATION)
Status: COMPLETED | OUTPATIENT
Start: 2024-08-28 | End: 2024-08-28

## 2024-08-28 RX ADMIN — IPRATROPIUM BROMIDE AND ALBUTEROL SULFATE 3 ML: 2.5; .5 SOLUTION RESPIRATORY (INHALATION) at 05:08

## 2024-08-28 NOTE — ED NOTES
Pt BIB EMS c/o of shortness of breath. EMS reported that pt mom was concerned about difficulty swallowing. Pt denies SOB, difficulty swallowing, n/v, or chest pain.

## 2024-08-28 NOTE — DISCHARGE INSTRUCTIONS
Please return immediately if you get worse or if new problems develop.  Please follow-up with your primary care doctor this week.  Rest.  Lots of liquids.

## 2024-08-28 NOTE — ED PROVIDER NOTES
"Encounter Date: 8/28/2024       History     Chief Complaint   Patient presents with    Shortness of Breath     EMS called by home health nurse due to c/o SOB with possible aspiration x 3 days. Crackles to bilateral lower lobes. EMS reports patient was 92% on room air. Pt has a history of CVA with left sided deficits.      HPI  This 58-year-old white male presents emergency room with a history congestive heart failure bacterial pneumonia parapneumonic effusions and empyema.  The patient has a history of encephalopathy.  He has a history of congestive heart failure as well as COPD.  He has a history of diabetic ketoacidosis and diabetes type 1.  He also has a history of psychiatric disease and substance abuse.  EMS reports that he was very agitated on the scene.  EMS believes that the patient's agitation is caused by the mother.  Review of patient's allergies indicates:  No Known Allergies  Past Medical History:   Diagnosis Date    Abnormal EEG 03/16/2024    3/16/2024 "Impression:   This is an abnormal awake and sleep vEEG consistent with a moderate diffuse encephalopathy and multifocal regions of cortical irritability that is most prominent in the right > left hemisphere.  No clinical events or electrographic seizures are recorded.  "    Acute on chronic pancreatitis 04/03/2016    Bacterial pneumonia 02/29/2020    Cavitary lung lesions  Parapneumonic effusion/ EMPYEMA    CHF (congestive heart failure)     Closed compression fracture of body of L1 vertebra 05/09/2020    COPD (chronic obstructive pulmonary disease) 08/08/2020    DM (diabetes mellitus), type 1     c/b DKA    HCV (hepatitis C virus)     08/2023 count < 12    HTN (hypertension)     Hx of psychiatric care     Stroke     R sided deficits    Substance abuse     IVDU heroin, methamphetamines     Past Surgical History:   Procedure Laterality Date    CARDIAC SURGERY  1999    stent placed. ([facility])    ESOPHAGOGASTRODUODENOSCOPY N/A 3/5/2020    " Gastritis.  No H pylori.  Completed PPI for 1 month.  Procedure: EGD (ESOPHAGOGASTRODUODENOSCOPY);  Surgeon: Brinda Rene MD;  Location: [facility] ENDO;  Service: Endoscopy;  Laterality: N/A;  W421 A; [id number]    LEFT HEART CATHETERIZATION Left 2020    Procedure: Left heart cath;  Surgeon: Fito Rangel MD;  Location: [facility] CATH LAB;  Service: Cardiology;  Laterality: Left;    SHOULDER SURGERY      right shoulder, spur removal.     Family History   Problem Relation Name Age of Onset    Asthma Mother       Social History     Tobacco Use    Smoking status: Former     Current packs/day: 0.00     Average packs/day: 1 pack/day for 39.2 years (39.2 ttl pk-yrs)     Types: Cigarettes     Start date: 4/3/1981     Quit date: 2020     Years since quittin.2    Smokeless tobacco: Never    Tobacco comments:     states approx 1 per day.   Substance Use Topics    Alcohol use: Not Currently     Alcohol/week: 0.0 standard drinks of alcohol    Drug use: Yes     Types: Heroin, IV, Marijuana, Amphetamines     Comment: chronic methamphetamine user     Review of Systems  The patient was questioned specifically with regard to the following.  General: Fever, chills, sweats. Neuro: Headache. Eyes: eye problems. ENT: Ear pain, sore throat. Cardiovascular: Chest pain. Respiratory: Cough, shortness of breath. Gastrointestinal: Abdominal pain, vomiting, diarrhea. Genitourinary: Painful urination.  Musculoskeletal: Arm and leg problems. Skin: Rash.  The review of systems was negative except for the following:  Cough shortness of breath agitation in the field  Physical Exam     Initial Vitals [24 1556]   BP Pulse Resp Temp SpO2   132/82 100 18 99 °F (37.2 °C) 96 %      MAP       --         Physical Exam  The patient was examined specifically for the following:   General:No significant distress, Good color, Warm and dry. Head and neck:Scalp atraumatic, Neck supple. Neurological:Appropriate conversation, Gross motor deficits.  Eyes:Conjugate gaze, Clear corneas. ENT: No epistaxis. Cardiac: Regular rate and rhythm, Grossly normal heart tones. Pulmonary: Wheezing, Rales. Gastrointestinal: Abdominal tenderness, Abdominal distention. Musculoskeletal: Extremity deformity, Apparent pain with range of motion of the joints. Skin: Rash.   The findings on examination were normal except for the following:  There is no clinical evidence of respiratory distress.  The patient's heart rate is 100.  Vital signs are stable.  The lungs are clear and free of wheezing rales rubs or rhonchi.  The abdomen is soft.  Remedies are nontender there is no apparent pain with range of motion any joints.  ED Course   Procedures  Labs Reviewed   CBC W/ AUTO DIFFERENTIAL - Abnormal       Result Value    WBC 8.71      RBC 4.72      Hemoglobin 11.4 (*)     Hematocrit 36.4 (*)     MCV 77 (*)     MCH 24.2 (*)     MCHC 31.3 (*)     RDW 14.7 (*)     Platelets 347      MPV 9.5      Immature Granulocytes 0.5      Gran # (ANC) 5.9      Immature Grans (Abs) 0.04      Lymph # 1.3      Mono # 0.9      Eos # 0.6 (*)     Baso # 0.03      nRBC 0      Gran % 67.5      Lymph % 14.8 (*)     Mono % 9.9      Eosinophil % 7.0      Basophil % 0.3      Differential Method Automated     COMPREHENSIVE METABOLIC PANEL - Abnormal    Sodium 137      Potassium 4.0      Chloride 103      CO2 25      Glucose 239 (*)     BUN 20      Creatinine 0.9      Calcium 9.2      Total Protein 6.9      Albumin 3.4 (*)     Total Bilirubin 0.3      Alkaline Phosphatase 94      AST 14      ALT 14      eGFR >60      Anion Gap 9     ACETAMINOPHEN LEVEL - Abnormal    Acetaminophen (Tylenol), Serum <3.0 (*)    POCT GLUCOSE - Abnormal    POCT Glucose 230 (*)    TSH    TSH 0.409     ALCOHOL,MEDICAL (ETHANOL)    Alcohol, Serum <10     B-TYPE NATRIURETIC PEPTIDE    BNP 26     TROPONIN I    Troponin I <0.006     URINALYSIS, REFLEX TO URINE CULTURE   DRUG SCREEN PANEL, URINE EMERGENCY   SARS-COV-2 RDRP GENE    POC Rapid COVID  Negative       Acceptable Yes     POCT GLUCOSE MONITORING CONTINUOUS     EKG Readings: (Independently Interpreted)   This patient is in a normal sinus rhythm with a heart rate of 94.  The patient has a normal axis there are no significant ST segment or T-wave changes.  There is no definite evidence of acute myocardial infarction or malignant arrhythmia.  This is doctor Beauchamp dictating an I independently interpreted this EKG.       Imaging Results              X-Ray Chest 1 View (Final result)  Result time 08/28/24 16:55:55      Final result by Chetan Sears MD (08/28/24 16:55:55)                   Impression:      1. Chronic appearing interstitial findings, no large focal consolidation.      Electronically signed by: Chetan Sears MD  Date:    08/28/2024  Time:    16:55               Narrative:    EXAMINATION:  XR CHEST 1 VIEW    CLINICAL HISTORY:  Shortness of breath    TECHNIQUE:  Single frontal view of the chest was performed.    COMPARISON:  07/28/2024    FINDINGS:  The cardiomediastinal silhouette is not enlarged.  There is no pleural effusion.  The trachea is midline.  The lungs are symmetrically expanded bilaterally with coarse interstitial attenuation.  No large focal consolidation seen.  There is no pneumothorax.  The osseous structures are remarkable for degenerative change..                                       Medications   albuterol-ipratropium 2.5 mg-0.5 mg/3 mL nebulizer solution 3 mL (3 mLs Nebulization Given 8/28/24 1713)     Medical Decision Making  Amount and/or Complexity of Data Reviewed  Labs: ordered.  Radiology: ordered.    Risk  Prescription drug management.    Given the above, EMS was dispatched with the house because this patient was short of breath.  He was not treated by EMS.  He was agitated and unhappy according to EMS.  Here in the emergency room we see no evidence of respiratory distress.  Chest x-ray is negative for pneumothorax pneumonia pleural effusion  the EKG shows no evidence of ischemia there is no tachycardia.  Oxygen saturations are 100% on room air the heart rate is 89.  Blood pressure is 132/69 I doubt sepsis.  The patient has a glucose of 239 consistent with his history of diabetes mellitus.  The patient has a mild anemia with a hemoglobin of 11.4.  Adequate is 36 the alcohol level is 0.  The patient did not make urine in the emergency room.  He wants to see his mother.  His mother is not here.  I will discharge to outpatient evaluation and treatment.  I doubt significant drug intoxication and urinary tract infection.                                    Clinical Impression:  Final diagnoses:  [R06.02] Shortness of breath  [F41.9] Anxiety (Primary)          ED Disposition Condition    Discharge Stable          ED Prescriptions    None       Follow-up Information       Follow up With Specialties Details Why Contact Info    Lawanda Flores NP Family Medicine In 3 days  [address]  [address]  [facility] [address]  [phone]               Cullen Beauchamp MD  08/28/24 2575

## 2024-08-29 LAB
OHS QRS DURATION: 88 MS
OHS QTC CALCULATION: 467 MS

## 2024-09-19 ENCOUNTER — OFFICE VISIT (OUTPATIENT)
Dept: HOME HEALTH SERVICES | Facility: CLINIC | Age: 59
End: 2024-09-19
Payer: MEDICARE

## 2024-09-19 VITALS
DIASTOLIC BLOOD PRESSURE: 62 MMHG | OXYGEN SATURATION: 94 % | TEMPERATURE: 98 F | HEART RATE: 92 BPM | SYSTOLIC BLOOD PRESSURE: 112 MMHG

## 2024-09-19 DIAGNOSIS — J44.1 CHRONIC OBSTRUCTIVE PULMONARY DISEASE WITH ACUTE EXACERBATION: ICD-10-CM

## 2024-09-19 DIAGNOSIS — I69.351 HEMIPARESIS AFFECTING RIGHT SIDE AS LATE EFFECT OF STROKE: Primary | ICD-10-CM

## 2024-09-19 DIAGNOSIS — F63.81 INTERMITTENT EXPLOSIVE DISORDER: ICD-10-CM

## 2024-09-19 DIAGNOSIS — E10.65 TYPE 1 DIABETES MELLITUS WITH HYPERGLYCEMIA: ICD-10-CM

## 2024-09-19 PROBLEM — M25.532 LEFT WRIST PAIN: Status: RESOLVED | Noted: 2023-04-08 | Resolved: 2024-09-19

## 2024-09-19 PROBLEM — U07.1 COVID: Status: RESOLVED | Noted: 2024-03-04 | Resolved: 2024-09-19

## 2024-09-19 PROBLEM — N30.00 ACUTE CYSTITIS WITHOUT HEMATURIA: Status: RESOLVED | Noted: 2023-12-12 | Resolved: 2024-09-19

## 2024-09-19 RX ORDER — BENZONATATE 100 MG/1
100 CAPSULE ORAL 3 TIMES DAILY PRN
Qty: 30 CAPSULE | Refills: 0 | Status: SHIPPED | OUTPATIENT
Start: 2024-09-19 | End: 2024-10-03

## 2024-09-19 RX ORDER — NAPROXEN SODIUM 220 MG/1
81 TABLET, FILM COATED ORAL DAILY
Qty: 90 TABLET | Refills: 3 | Status: SHIPPED | OUTPATIENT
Start: 2024-09-19 | End: 2025-09-19

## 2024-09-19 RX ORDER — ATORVASTATIN CALCIUM 20 MG/1
20 TABLET, FILM COATED ORAL DAILY
Qty: 90 TABLET | Refills: 1 | Status: SHIPPED | OUTPATIENT
Start: 2024-09-19 | End: 2025-03-18

## 2024-09-19 RX ORDER — UMECLIDINIUM BROMIDE AND VILANTEROL TRIFENATATE 62.5; 25 UG/1; UG/1
1 POWDER RESPIRATORY (INHALATION) DAILY
Qty: 60 EACH | Refills: 2 | Status: SHIPPED | OUTPATIENT
Start: 2024-09-19 | End: 2025-03-18

## 2024-09-19 NOTE — ASSESSMENT & PLAN NOTE
Lungs clear but diminished to bases, not taking deep breaths and poor body habitus  Sat 92-94 on RA  Anoro inhaler in place  Pt did not complete steroid course after hospitalization as he improved and mother was concerned over glucose in 400s while on the steroids  Continue inhaler  Monitor

## 2024-09-19 NOTE — PROGRESS NOTES
[facility]  Transitional Care Management (TCM) Home Visit    Encounter Provider: Megan Garner   PCP: Lawanda Flores NP  Consult Requested By: Dr. José Jonse  Admit Date: 8/28/24   IP Discharge Date: 8/28/24  Hospital Length of Stay:21 days  Days since discharge (from IP or SNF):   [facility] Contact Note:   Hospital Diagnosis: Chronic obstructive pulmonary disease with acute exacerbation [J44.1]     HISTORY OF PRESENT ILLNESS      Patient ID: Cali Mabry is a 59 y.o. male was recently admitted to the hospital, this is their TCM encounter.    Hospital Course Synopsis:    58-year-old male with medical history significant for cerebrovascular accident with mild residual right-sided weakness, chronic obstructive pulmonary disease, type 1 diabetes mellitus, hepatitis-C infection, hypertension, polysubstance abuse- methamphetamine, heroin and tobacco use disorder with 40 pack-year cigarette smoking     admitted with acute respiratory failure secondary to pneumonia and COPD exacerbation. He was started on steroids, IV antibiotics and breathing treatments. Oxygen weaned to room air saturating 96% on room air. No further wheezing and shortness of breath improving. Vitals stable and glucose better controlled. Overall, he was doing well and stable for discharge home. He will be discharged home to complete steroid course and antibiotic course.     Pt is found in his home today, awake lying in a hospital bed watching TV. His mother is present for visit. Initially pt was pleasant and agreeable. Doing med review and ROS pt did not agree with mother's answers to questions, multiple meds on list not in home. He became angry cursing first at mother and then at provider. Asking both of us to leave him alone. Using very foul language, calling his mother stupid. He was calmed down and allowed bp and assessment, then became irate and cursing with F words. Mother and he yelling at each other in [place]. She  denies any problems with him hurting himself or her, he is just verbally abusive at times.  She reports he is only taking asa, statin, insulin and zyxal. Anti-biotics were complete, steroids still in bottle stopped due to hyperglycemia.  He has had therapy without much improvement. She cannot get him out of bed, her daughter comes after work and they get him up and try to exercise his limbs in the afternoon    DECISION MAKING TODAY       Assessment & Plan:  1. Hemiparesis affecting right side as late effect of stroke  Assessment & Plan:  History of stroke   With mild residual right-sided weakness   Continue secondary prevention of stroke  Aspirin, atorvastatin in place  Failed to regain mobility with PT  Essentially bedbound at this time      Orders:  -     atorvastatin (LIPITOR) 20 MG tablet; Take 1 tablet (20 mg total) by mouth once daily.  Dispense: 90 tablet; Refill: 1  -     aspirin 81 MG Chew; Take 1 tablet (81 mg total) by mouth once daily.  Dispense: 90 tablet; Refill: 3    2. Chronic obstructive pulmonary disease with acute exacerbation  Assessment & Plan:  Lungs clear but diminished to bases, not taking deep breaths and poor body habitus  Sat 92-94 on RA  Anoro inhaler in place  Pt did not complete steroid course after hospitalization as he improved and mother was concerned over glucose in 400s while on the steroids  Continue inhaler  Monitor    Orders:  -     Ambulatory referral/consult to [facility]  -     benzonatate (TESSALON) 100 MG capsule; Take 1 capsule (100 mg total) by mouth 3 (three) times daily as needed for Cough.  Dispense: 30 capsule; Refill: 0  -     umeclidinium-vilanteroL (ANORO ELLIPTA) 62.5-25 mcg/actuation DsDv; Inhale 1 puff into the lungs once daily. Controller  Dispense: 60 each; Refill: 2    3. Type 1 diabetes mellitus with hyperglycemia  Assessment & Plan:  Glucose 182   Continue insulin as currently in place  Monitor           Medication List on Discharge:    "  Medication List            Accurate as of September 19, 2024  3:00 PM. If you have any questions, ask your nurse or doctor.                START taking these medications      ANORO ELLIPTA 62.5-25 mcg/actuation Dsdv  Generic drug: umeclidinium-vilanteroL  Inhale 1 puff into the lungs once daily. Controller  Started by: Megan Garner NP     benzonatate 100 MG capsule  Commonly known as: TESSALON  Take 1 capsule (100 mg total) by mouth 3 (three) times daily as needed for Cough.  Started by: eMgan Garner NP            CONTINUE taking these medications      albuterol 90 mcg/actuation inhaler  Commonly known as: PROVENTIL HFA  Inhale 2 puffs into the lungs every 6 (six) hours as needed for Wheezing. Rescue     aspirin 81 MG Chew  Take 1 tablet (81 mg total) by mouth once daily.     atorvastatin 20 MG tablet  Commonly known as: LIPITOR  Take 1 tablet (20 mg total) by mouth once daily.     BD GERTRUDIS 2ND GEN PEN NEEDLE 32 gauge x 5/32" Ndle  Generic drug: pen needle, diabetic  Inject into the skin 4 (four) times daily.     folic acid 1 MG tablet  Commonly known as: FOLVITE  Take 1 tablet (1 mg total) by mouth once daily.     FREESTYLE SEBASTIAN 2 SENSOR Kit  Generic drug: flash glucose sensor  USE FOUR TIMES PER WEEK AS DIRECTED     insulin detemir U-100 (Levemir) 100 unit/mL (3 mL) Inpn pen  Inject 10 Units into the skin 2 (two) times daily. Inject 10 units under the skin in the morning and 10 units in the evening.     insulin lispro 100 unit/mL pen  Inject 5 Units into the skin 3 (three) times daily before meals.     levocetirizine 5 MG tablet  Commonly known as: XYZAL  Take 5 mg by mouth every evening.     naloxone 4 mg/actuation Spry  Commonly known as: NARCAN  4mg by nasal route as needed for opioid overdose; may repeat every 2-3 minutes in alternating nostrils until medical help arrives. Call 911            STOP taking these medications      carBAMazepine 200 mg tablet  Commonly known as: TEGRETOL  Stopped by: " Megan Garner NP     NIFEdipine 60 MG (OSM) 24 hr tablet  Commonly known as: PROCARDIA-XL  Stopped by: Megan Garner NP              Medication Reconciliation:  Were medications changed on discharge? Yes  Were medications in the home? No  Is the patient taking the medications as directed? Yes  Does the patient understand the medications and changes? Yes  Does updated med list accurately reflects meds patient is currently taking? No    ENVIRONMENT OF CARE      Family and/or Caregiver present at visit?  Yes  Name of Caregiver:   History provided by: patient    Advance Care Planning   Advanced Care Planning Status:  Patient has had an ACP conversation  Living Will: No  Power of : No  [place]: No    Does Caregiver have HCPoA: No  Changes today:   Is patient hospice appropriate: No  (If needed, use PPS <30 or FAST score >7)  Was referral to hospice placed: No       Impression upon entering the home:  Physical Dwelling: single family home   Appearance of home environment: cleaniness: clean  Functional Status: max assistance  Mobility: bedbound  Nutritional access: adequate intake and access  Home Health: No, and does not need it at this time   DME/Supplies: hospital bed     Diagnostic tests reviewed/disposition: No diagnosic tests pending after this hospitalization.  Disease/illness education:  COPD  Establishment or re-establishment of referral orders for community resources: No other necessary community resources.   Discussion with other health care providers: No discussion with other health care providers necessary.   Does patient have a PCP at [facility]? Yes   Repatriation plan with PCP? [facility] reason: mobility   Does patient have an ostomy (ileostomy, colostomy, suprapubic catheter, nephrostomy tube, tracheostomy, PEG tube, pleurex catheter, cholecystostomy, etc)? No  Were BPAs reviewed? Yes    Social History     Socioeconomic History    Marital status: Single   Tobacco Use    Smoking status: Former      Current packs/day: 0.00     Average packs/day: 1 pack/day for 39.2 years (39.2 ttl pk-yrs)     Types: Cigarettes     Start date: 4/3/1981     Quit date: 2020     Years since quittin.3    Smokeless tobacco: Never    Tobacco comments:     states approx 1 per day.   Substance and Sexual Activity    Alcohol use: Not Currently     Alcohol/week: 0.0 standard drinks of alcohol    Drug use: Yes     Types: Heroin, IV, Marijuana, Amphetamines     Comment: chronic methamphetamine user    Sexual activity: Yes     Partners: Female     Social Determinants of Health     Financial Resource Strain: Low Risk  (3/7/2024)    Overall Financial Resource Strain (CARDIA)     Difficulty of Paying Living Expenses: Not very hard   Food Insecurity: No Food Insecurity (3/7/2024)    Hunger Vital Sign     Worried About Running Out of Food in the Last Year: Never true     Ran Out of Food in the Last Year: Never true   Transportation Needs: No Transportation Needs (3/7/2024)    PRAPARE - Transportation     Lack of Transportation (Medical): No     Lack of Transportation (Non-Medical): No   Physical Activity: Inactive (3/7/2024)    Exercise Vital Sign     Days of Exercise per Week: 0 days     Minutes of Exercise per Session: 0 min   Stress: No Stress Concern Present (3/7/2024)    [employer] - Occupational Stress Questionnaire     Feeling of Stress : Not at all   Housing Stability: Low Risk  (3/7/2024)    Housing Stability Vital Sign     Unable to Pay for Housing in the Last Year: No     Number of Places Lived in the Last Year: 1     Unstable Housing in the Last Year: No       OBJECTIVE:     Vital Signs:  Vitals:    24 1040   BP: 112/62   Pulse: 92   Temp: 97.8 °F (36.6 °C)       Review of Systems   Constitutional:  Negative for activity change, fatigue and fever.   HENT: Negative.     Eyes: Negative.    Respiratory:  Positive for cough. Negative for chest tightness.    Cardiovascular: Negative.  Negative  for leg swelling.   Gastrointestinal: Negative.    Endocrine: Negative.    Genitourinary: Negative.    Musculoskeletal: Negative.    Skin: Negative.    Allergic/Immunologic: Negative.    Neurological:  Positive for weakness (right side).   Hematological: Negative.    Psychiatric/Behavioral:  Positive for agitation.    All other systems reviewed and are negative.      Physical Exam:  Physical Exam  Vitals reviewed.   Constitutional:       General: He is not in acute distress.     Appearance: He is well-developed. He is ill-appearing.      Comments: thin   HENT:      Head: Normocephalic and atraumatic.      Nose: Nose normal.      Mouth/Throat:      Mouth: Mucous membranes are dry.   Eyes:      Pupils: Pupils are equal, round, and reactive to light.   Cardiovascular:      Rate and Rhythm: Normal rate and regular rhythm.      Heart sounds: Normal heart sounds.   Pulmonary:      Effort: Pulmonary effort is normal.      Breath sounds: Normal breath sounds.   Abdominal:      General: Bowel sounds are normal.      Palpations: Abdomen is soft.   Musculoskeletal:      Cervical back: Normal range of motion and neck supple.      Comments: Muscle atrophy, right hemiparesis   Skin:     General: Skin is warm and dry.   Neurological:      Mental Status: He is alert.      Cranial Nerves: No cranial nerve deficit.      Comments: Answers questions correctly,  Yells, curses, verbally abusive and aggressive to NP and mother         INSTRUCTIONS FOR PATIENT:     Scheduled Follow-up, Appts Reviewed with Modifications if Needed: Yes  No future appointments.    Signature: Megan Garner NP    Transition of Care Visit:  I have reviewed and updated the history and problem list.  I have reconciled the medication list.  I have discussed the hospitalization and current medical issues, prognosis and plans with the patient/family.

## 2024-09-19 NOTE — ASSESSMENT & PLAN NOTE
History of stroke   With mild residual right-sided weakness   Continue secondary prevention of stroke  Aspirin, atorvastatin in place  Failed to regain mobility with PT  Essentially bedbound at this time

## 2024-09-20 ENCOUNTER — E-CONSULT (OUTPATIENT)
Dept: BEHAVIORAL HEALTH | Facility: CLINIC | Age: 59
End: 2024-09-20
Payer: MEDICARE

## 2024-09-20 DIAGNOSIS — F63.81 INTERMITTENT EXPLOSIVE DISORDER: Primary | ICD-10-CM

## 2024-09-20 NOTE — CONSULTS
"[facility]  Response for E-Consult     Patient Name: Cali Mabry  MRN: [id number]  Primary Care Provider: Lawanda Flores NP   Requesting Provider: Megan Garner NP    E-Consult to Adult Psychiatry  Consult performed by: Jered Call MD  Consult ordered by: Megan Garner NP      Per requesting provider:   "Sertraline on med list, not in home, mother denies any current meds for behavior, cursing verbally abusive to mother, NP and during hospitalization. Recs on medications?"     After evaluation of your eConsult clinical questions, I believe the patient should be scheduled for an office visit in our specialty due to the complexity of his psychiatric and medical illness.      Per chart review, he has a psychiatric history of bipolar disorder?, intermittent explosive disorder and amphetamine abuse as well as neurological history of CVA with residual cognitive/behavioral deficits.  Pt evaluated by [facility] psychiatry team during hospitalization 03/2024 and was on a regimen of tegretol 200mg qam + 400mg nightly and effexor XR 75mg daily.      If treatment of impulsivity and behavioral outbursts is needed, would re-initiate this regimen   Please be mindful that tegretol induces its own metabolism and that the dose/effectiveness will need to be re-assessed in 4-6 wks  Also will need to check tegretol levels to monitor for toxicity    Goal tegretol level is 5-10  Please monitor for electrolyte disturbance while on tegretol and consider checking for necessary dose adjustments to pt's nonpsychiatric regimen    Would recommend that pt be referred to [facility] department of psychiatry (consult liaison psychiatry clinic if available) for full assessment and management.     Total time of Consultation: 15 minute    *This eConsult is based on the clinical data available to me and is furnished without benefit of a physician examination.  The eConsult will need to be " interpreted in light of any clinical issues of changes in patient status not available to me at the time rime of filing this eConsults.  Significant changes in patient condition of level of acuity should result in a referral for in person consultation and reevaluation.  Please alert me if you have any furth questions.     Thank you for this eConsult referral.     Jered Call MD  [facility]

## 2024-09-23 DIAGNOSIS — F63.81 INTERMITTENT EXPLOSIVE DISORDER: Primary | ICD-10-CM

## 2024-09-23 RX ORDER — VENLAFAXINE HYDROCHLORIDE 75 MG/1
75 CAPSULE, EXTENDED RELEASE ORAL NIGHTLY
Qty: 30 CAPSULE | Refills: 11 | Status: SHIPPED | OUTPATIENT
Start: 2024-09-23 | End: 2025-09-23

## 2024-09-23 NOTE — PROGRESS NOTES
Reviewed E-consult recs.  Discussed with mother.   She agrees to restart Effexor as per recs and previous hospital stay course.  Will not resume tegretol at this time, as labs are not possbile since pt cannot leave home at this time.  Will begin with Effexor  Mother will notify on any improvement or worsening of symptoms.  She also reports Tessalon perles are helping his cough.

## 2024-09-29 ENCOUNTER — HOSPITAL ENCOUNTER (INPATIENT)
Facility: HOSPITAL | Age: 59
LOS: 2 days | Discharge: HOME HEALTH | DRG: 194 | End: 2024-10-01
Attending: EMERGENCY MEDICINE | Admitting: HOSPITALIST
Payer: MEDICARE

## 2024-09-29 DIAGNOSIS — R53.1 WEAKNESS: ICD-10-CM

## 2024-09-29 DIAGNOSIS — R07.9 CHEST PAIN: ICD-10-CM

## 2024-09-29 DIAGNOSIS — J18.9 COMMUNITY ACQUIRED PNEUMONIA OF RIGHT LOWER LOBE OF LUNG: Primary | ICD-10-CM

## 2024-09-29 DIAGNOSIS — J44.1 CHRONIC OBSTRUCTIVE PULMONARY DISEASE WITH ACUTE EXACERBATION: ICD-10-CM

## 2024-09-29 DIAGNOSIS — J18.9 PNEUMONIA: ICD-10-CM

## 2024-09-29 PROBLEM — E11.65 DIABETES MELLITUS WITH HYPERGLYCEMIA: Status: ACTIVE | Noted: 2021-05-02

## 2024-09-29 PROBLEM — Z86.73 HISTORY OF CVA (CEREBROVASCULAR ACCIDENT): Status: ACTIVE | Noted: 2024-09-29

## 2024-09-29 LAB
ALBUMIN SERPL BCP-MCNC: 3 G/DL (ref 3.5–5.2)
ALLENS TEST: ABNORMAL
ALP SERPL-CCNC: 100 U/L (ref 55–135)
ALT SERPL W/O P-5'-P-CCNC: 17 U/L (ref 10–44)
ANION GAP SERPL CALC-SCNC: 15 MMOL/L (ref 8–16)
AST SERPL-CCNC: 19 U/L (ref 10–40)
B-OH-BUTYR BLD STRIP-SCNC: 0.7 MMOL/L (ref 0–0.5)
BASOPHILS # BLD AUTO: 0.02 K/UL (ref 0–0.2)
BASOPHILS NFR BLD: 0.2 % (ref 0–1.9)
BILIRUB SERPL-MCNC: 0.6 MG/DL (ref 0.1–1)
BNP SERPL-MCNC: 18 PG/ML (ref 0–99)
BUN SERPL-MCNC: 25 MG/DL (ref 6–20)
CALCIUM SERPL-MCNC: 9 MG/DL (ref 8.7–10.5)
CHLORIDE SERPL-SCNC: 94 MMOL/L (ref 95–110)
CO2 SERPL-SCNC: 19 MMOL/L (ref 23–29)
CREAT SERPL-MCNC: 1.4 MG/DL (ref 0.5–1.4)
CTP QC/QA: YES
DIFFERENTIAL METHOD BLD: ABNORMAL
EOSINOPHIL # BLD AUTO: 0 K/UL (ref 0–0.5)
EOSINOPHIL NFR BLD: 0.3 % (ref 0–8)
ERYTHROCYTE [DISTWIDTH] IN BLOOD BY AUTOMATED COUNT: 15.6 % (ref 11.5–14.5)
EST. GFR  (NO RACE VARIABLE): 58 ML/MIN/1.73 M^2
GLUCOSE SERPL-MCNC: 472 MG/DL (ref 70–110)
HCO3 UR-SCNC: 25.8 MMOL/L (ref 24–28)
HCT VFR BLD AUTO: 39.6 % (ref 40–54)
HGB BLD-MCNC: 12.3 G/DL (ref 14–18)
IMM GRANULOCYTES # BLD AUTO: 0.07 K/UL (ref 0–0.04)
IMM GRANULOCYTES NFR BLD AUTO: 0.5 % (ref 0–0.5)
INR PPP: 1 (ref 0.8–1.2)
LACTATE SERPL-SCNC: 1.4 MMOL/L (ref 0.5–2.2)
LIPASE SERPL-CCNC: 52 U/L (ref 4–60)
LYMPHOCYTES # BLD AUTO: 0.8 K/UL (ref 1–4.8)
LYMPHOCYTES NFR BLD: 5.9 % (ref 18–48)
MAGNESIUM SERPL-MCNC: 1.9 MG/DL (ref 1.6–2.6)
MCH RBC QN AUTO: 24.3 PG (ref 27–31)
MCHC RBC AUTO-ENTMCNC: 31.1 G/DL (ref 32–36)
MCV RBC AUTO: 78 FL (ref 82–98)
MONOCYTES # BLD AUTO: 1.1 K/UL (ref 0.3–1)
MONOCYTES NFR BLD: 8.3 % (ref 4–15)
NEUTROPHILS # BLD AUTO: 10.9 K/UL (ref 1.8–7.7)
NEUTROPHILS NFR BLD: 84.8 % (ref 38–73)
NRBC BLD-RTO: 0 /100 WBC
PCO2 BLDA: 42.9 MMHG (ref 35–45)
PH SMN: 7.39 [PH] (ref 7.35–7.45)
PLATELET # BLD AUTO: 369 K/UL (ref 150–450)
PMV BLD AUTO: 10.1 FL (ref 9.2–12.9)
PO2 BLDA: 35 MMHG (ref 40–60)
POC BE: 0 MMOL/L
POC SATURATED O2: 66 % (ref 95–100)
POC TCO2: 27 MMOL/L (ref 24–29)
POCT GLUCOSE: 345 MG/DL (ref 70–110)
POCT GLUCOSE: 385 MG/DL (ref 70–110)
POCT GLUCOSE: 500 MG/DL (ref 70–110)
POTASSIUM SERPL-SCNC: 4.4 MMOL/L (ref 3.5–5.1)
PROT SERPL-MCNC: 7.6 G/DL (ref 6–8.4)
PROTHROMBIN TIME: 10.9 SEC (ref 9–12.5)
RBC # BLD AUTO: 5.07 M/UL (ref 4.6–6.2)
SAMPLE: ABNORMAL
SARS-COV-2 RDRP RESP QL NAA+PROBE: NEGATIVE
SITE: ABNORMAL
SODIUM SERPL-SCNC: 128 MMOL/L (ref 136–145)
TROPONIN I SERPL DL<=0.01 NG/ML-MCNC: 0.01 NG/ML (ref 0–0.03)
WBC # BLD AUTO: 12.85 K/UL (ref 3.9–12.7)

## 2024-09-29 PROCEDURE — 99900035 HC TECH TIME PER 15 MIN (STAT)

## 2024-09-29 PROCEDURE — 85610 PROTHROMBIN TIME: CPT | Performed by: EMERGENCY MEDICINE

## 2024-09-29 PROCEDURE — 82962 GLUCOSE BLOOD TEST: CPT

## 2024-09-29 PROCEDURE — 83880 ASSAY OF NATRIURETIC PEPTIDE: CPT | Performed by: EMERGENCY MEDICINE

## 2024-09-29 PROCEDURE — 25000003 PHARM REV CODE 250: Performed by: HOSPITALIST

## 2024-09-29 PROCEDURE — 83735 ASSAY OF MAGNESIUM: CPT | Performed by: EMERGENCY MEDICINE

## 2024-09-29 PROCEDURE — 80053 COMPREHEN METABOLIC PANEL: CPT | Performed by: EMERGENCY MEDICINE

## 2024-09-29 PROCEDURE — 63600175 PHARM REV CODE 636 W HCPCS: Performed by: STUDENT IN AN ORGANIZED HEALTH CARE EDUCATION/TRAINING PROGRAM

## 2024-09-29 PROCEDURE — 85025 COMPLETE CBC W/AUTO DIFF WBC: CPT | Performed by: EMERGENCY MEDICINE

## 2024-09-29 PROCEDURE — 87635 SARS-COV-2 COVID-19 AMP PRB: CPT | Performed by: EMERGENCY MEDICINE

## 2024-09-29 PROCEDURE — 25000003 PHARM REV CODE 250: Performed by: EMERGENCY MEDICINE

## 2024-09-29 PROCEDURE — 84484 ASSAY OF TROPONIN QUANT: CPT | Performed by: EMERGENCY MEDICINE

## 2024-09-29 PROCEDURE — 87040 BLOOD CULTURE FOR BACTERIA: CPT | Performed by: EMERGENCY MEDICINE

## 2024-09-29 PROCEDURE — 96361 HYDRATE IV INFUSION ADD-ON: CPT

## 2024-09-29 PROCEDURE — 83690 ASSAY OF LIPASE: CPT | Performed by: EMERGENCY MEDICINE

## 2024-09-29 PROCEDURE — 82803 BLOOD GASES ANY COMBINATION: CPT

## 2024-09-29 PROCEDURE — 63600175 PHARM REV CODE 636 W HCPCS: Performed by: EMERGENCY MEDICINE

## 2024-09-29 PROCEDURE — 82010 KETONE BODYS QUAN: CPT | Performed by: EMERGENCY MEDICINE

## 2024-09-29 PROCEDURE — 25000003 PHARM REV CODE 250: Performed by: STUDENT IN AN ORGANIZED HEALTH CARE EDUCATION/TRAINING PROGRAM

## 2024-09-29 PROCEDURE — 96374 THER/PROPH/DIAG INJ IV PUSH: CPT

## 2024-09-29 PROCEDURE — 99285 EMERGENCY DEPT VISIT HI MDM: CPT | Mod: 25

## 2024-09-29 PROCEDURE — 83605 ASSAY OF LACTIC ACID: CPT | Performed by: EMERGENCY MEDICINE

## 2024-09-29 PROCEDURE — 21400001 HC TELEMETRY ROOM

## 2024-09-29 RX ORDER — MUPIROCIN 20 MG/G
OINTMENT TOPICAL 2 TIMES DAILY
Status: DISCONTINUED | OUTPATIENT
Start: 2024-09-29 | End: 2024-10-01 | Stop reason: HOSPADM

## 2024-09-29 RX ORDER — SODIUM CHLORIDE 0.9 % (FLUSH) 0.9 %
10 SYRINGE (ML) INJECTION EVERY 12 HOURS PRN
Status: DISCONTINUED | OUTPATIENT
Start: 2024-09-29 | End: 2024-10-01 | Stop reason: HOSPADM

## 2024-09-29 RX ORDER — ACETAMINOPHEN 325 MG/1
650 TABLET ORAL EVERY 4 HOURS PRN
Status: DISCONTINUED | OUTPATIENT
Start: 2024-09-29 | End: 2024-10-01 | Stop reason: HOSPADM

## 2024-09-29 RX ORDER — IBUPROFEN 200 MG
24 TABLET ORAL
Status: DISCONTINUED | OUTPATIENT
Start: 2024-09-29 | End: 2024-10-01 | Stop reason: HOSPADM

## 2024-09-29 RX ORDER — HEPARIN SODIUM 5000 [USP'U]/ML
5000 INJECTION, SOLUTION INTRAVENOUS; SUBCUTANEOUS EVERY 12 HOURS
Status: DISCONTINUED | OUTPATIENT
Start: 2024-09-29 | End: 2024-10-01 | Stop reason: HOSPADM

## 2024-09-29 RX ORDER — ALBUTEROL SULFATE 90 UG/1
2 INHALANT RESPIRATORY (INHALATION) EVERY 6 HOURS PRN
Status: DISCONTINUED | OUTPATIENT
Start: 2024-09-29 | End: 2024-09-29 | Stop reason: CLARIF

## 2024-09-29 RX ORDER — GLUCAGON 1 MG
1 KIT INJECTION
Status: DISCONTINUED | OUTPATIENT
Start: 2024-09-29 | End: 2024-10-01 | Stop reason: HOSPADM

## 2024-09-29 RX ORDER — MORPHINE SULFATE 4 MG/ML
4 INJECTION, SOLUTION INTRAMUSCULAR; INTRAVENOUS EVERY 4 HOURS PRN
Status: DISCONTINUED | OUTPATIENT
Start: 2024-09-29 | End: 2024-10-01

## 2024-09-29 RX ORDER — AMOXICILLIN 250 MG
1 CAPSULE ORAL DAILY PRN
Status: DISCONTINUED | OUTPATIENT
Start: 2024-09-29 | End: 2024-10-01 | Stop reason: HOSPADM

## 2024-09-29 RX ORDER — INSULIN GLARGINE 100 [IU]/ML
10 INJECTION, SOLUTION SUBCUTANEOUS 2 TIMES DAILY
Status: DISCONTINUED | OUTPATIENT
Start: 2024-09-29 | End: 2024-09-30

## 2024-09-29 RX ORDER — IBUPROFEN 200 MG
16 TABLET ORAL
Status: DISCONTINUED | OUTPATIENT
Start: 2024-09-29 | End: 2024-10-01 | Stop reason: HOSPADM

## 2024-09-29 RX ORDER — ALBUTEROL SULFATE 2.5 MG/.5ML
2.5 SOLUTION RESPIRATORY (INHALATION) EVERY 6 HOURS PRN
Status: DISCONTINUED | OUTPATIENT
Start: 2024-09-29 | End: 2024-10-01 | Stop reason: HOSPADM

## 2024-09-29 RX ORDER — ATORVASTATIN CALCIUM 10 MG/1
20 TABLET, FILM COATED ORAL NIGHTLY
Status: DISCONTINUED | OUTPATIENT
Start: 2024-09-29 | End: 2024-10-01 | Stop reason: HOSPADM

## 2024-09-29 RX ORDER — SODIUM CHLORIDE 9 MG/ML
INJECTION, SOLUTION INTRAVENOUS CONTINUOUS
Status: ACTIVE | OUTPATIENT
Start: 2024-09-29 | End: 2024-09-30

## 2024-09-29 RX ORDER — ACETAMINOPHEN 325 MG/1
650 TABLET ORAL EVERY 8 HOURS PRN
Status: DISCONTINUED | OUTPATIENT
Start: 2024-09-29 | End: 2024-10-01 | Stop reason: HOSPADM

## 2024-09-29 RX ORDER — INSULIN ASPART 100 [IU]/ML
0-10 INJECTION, SOLUTION INTRAVENOUS; SUBCUTANEOUS
Status: DISCONTINUED | OUTPATIENT
Start: 2024-09-29 | End: 2024-10-01 | Stop reason: HOSPADM

## 2024-09-29 RX ORDER — NALOXONE HCL 0.4 MG/ML
0.02 VIAL (ML) INJECTION
Status: DISCONTINUED | OUTPATIENT
Start: 2024-09-29 | End: 2024-10-01 | Stop reason: HOSPADM

## 2024-09-29 RX ORDER — IPRATROPIUM BROMIDE AND ALBUTEROL SULFATE 2.5; .5 MG/3ML; MG/3ML
3 SOLUTION RESPIRATORY (INHALATION) DAILY
Status: DISCONTINUED | OUTPATIENT
Start: 2024-09-30 | End: 2024-10-01 | Stop reason: HOSPADM

## 2024-09-29 RX ORDER — NAPROXEN SODIUM 220 MG/1
81 TABLET, FILM COATED ORAL DAILY
Status: DISCONTINUED | OUTPATIENT
Start: 2024-09-30 | End: 2024-10-01 | Stop reason: HOSPADM

## 2024-09-29 RX ORDER — MORPHINE SULFATE 4 MG/ML
2 INJECTION, SOLUTION INTRAMUSCULAR; INTRAVENOUS EVERY 4 HOURS PRN
Status: DISCONTINUED | OUTPATIENT
Start: 2024-09-29 | End: 2024-10-01

## 2024-09-29 RX ORDER — VENLAFAXINE HYDROCHLORIDE 75 MG/1
75 CAPSULE, EXTENDED RELEASE ORAL NIGHTLY
Status: DISCONTINUED | OUTPATIENT
Start: 2024-09-29 | End: 2024-10-01 | Stop reason: HOSPADM

## 2024-09-29 RX ORDER — ONDANSETRON HYDROCHLORIDE 2 MG/ML
4 INJECTION, SOLUTION INTRAVENOUS EVERY 8 HOURS PRN
Status: DISCONTINUED | OUTPATIENT
Start: 2024-09-29 | End: 2024-10-01 | Stop reason: HOSPADM

## 2024-09-29 RX ADMIN — SODIUM CHLORIDE 1000 ML: 9 INJECTION, SOLUTION INTRAVENOUS at 06:09

## 2024-09-29 RX ADMIN — AZITHROMYCIN 500 MG: 500 INJECTION, POWDER, LYOPHILIZED, FOR SOLUTION INTRAVENOUS at 07:09

## 2024-09-29 RX ADMIN — MUPIROCIN: 20 OINTMENT TOPICAL at 10:09

## 2024-09-29 RX ADMIN — INSULIN HUMAN 6 UNITS: 100 INJECTION, SOLUTION PARENTERAL at 05:09

## 2024-09-29 RX ADMIN — CEFTRIAXONE 1 G: 1 INJECTION, POWDER, FOR SOLUTION INTRAMUSCULAR; INTRAVENOUS at 06:09

## 2024-09-29 RX ADMIN — SODIUM CHLORIDE: 9 INJECTION, SOLUTION INTRAVENOUS at 10:09

## 2024-09-29 RX ADMIN — INSULIN GLARGINE 10 UNITS: 100 INJECTION, SOLUTION SUBCUTANEOUS at 10:09

## 2024-09-29 RX ADMIN — ATORVASTATIN CALCIUM 20 MG: 10 TABLET, FILM COATED ORAL at 10:09

## 2024-09-29 RX ADMIN — INSULIN ASPART 4 UNITS: 100 INJECTION, SOLUTION INTRAVENOUS; SUBCUTANEOUS at 10:09

## 2024-09-29 RX ADMIN — SODIUM CHLORIDE 1000 ML: 9 INJECTION, SOLUTION INTRAVENOUS at 04:09

## 2024-09-29 RX ADMIN — VENLAFAXINE HYDROCHLORIDE 75 MG: 75 CAPSULE, EXTENDED RELEASE ORAL at 10:09

## 2024-09-29 RX ADMIN — HEPARIN SODIUM 5000 UNITS: 5000 INJECTION INTRAVENOUS; SUBCUTANEOUS at 10:09

## 2024-09-29 NOTE — ED NOTES
"Cali Mabry, a 59 y.o. male presents to the ED w/ complaint of abdominal pain, cough, congestion, generalized weakness, fever, and hyperglycemia x 2 weeks.Patient is AAOx3, VSS, NAD. Denies CP, SOB, N/V/D/C, numbness, or tingling. Bed locked, in lowest position, bed rails up x2, call light in reach, all monitoring attached.    Triage note:  Chief Complaint   Patient presents with    Hyperglycemia     Pt BIB EMS, with a family c/o fatigue x 2 days. Pt's CBG is 435 mg/dL, per EMS. Pt poor historian and doesn't answer questions well.      Review of patient's allergies indicates:  No Known Allergies  Past Medical History:   Diagnosis Date    Abnormal EEG 03/16/2024    3/16/2024 "Impression:   This is an abnormal awake and sleep vEEG consistent with a moderate diffuse encephalopathy and multifocal regions of cortical irritability that is most prominent in the right > left hemisphere.  No clinical events or electrographic seizures are recorded.  "    Acute on chronic pancreatitis 04/03/2016    Bacterial pneumonia 02/29/2020    Cavitary lung lesions  Parapneumonic effusion/ EMPYEMA    CHF (congestive heart failure)     Closed compression fracture of body of L1 vertebra 05/09/2020    COPD (chronic obstructive pulmonary disease) 08/08/2020    DM (diabetes mellitus), type 1     c/b DKA    HCV (hepatitis C virus)     08/2023 count < 12    HTN (hypertension)     Hx of psychiatric care     Stroke     R sided deficits    Substance abuse     IVDU heroin, methamphetamines       "

## 2024-09-29 NOTE — ED PROVIDER NOTES
"Encounter Date: 9/29/2024    SCRIBE #1 NOTE: I, Mariah Carvalho, am scribing for, and in the presence of,  Mat Sanchez MD.       History     Chief Complaint   Patient presents with    Hyperglycemia     Pt BIB EMS, with a family c/o fatigue x 2 days. Pt's CBG is 435 mg/dL, per EMS. Pt poor historian and doesn't answer questions well.      60 yo M with PMHx cerebrovascular accident with mild residual right-sided weakness, chronic obstructive pulmonary disease, type 1 diabetes mellitus, hepatitis-C infection, hypertension, polysubstance abuse- methamphetamine, heroin and tobacco use disorder with 40 pack-year cigarette smoking, presenting to the ED for fatigue.     Patient currently poor historian, stating he "doesn't feel well". When being asked about specifics he states he is experiencing abdominal pain. He is otherwise unable to provide any additional history at this time. EMS report his CBG en route was 435.     The history is provided by the patient.     Review of patient's allergies indicates:  No Known Allergies  Past Medical History:   Diagnosis Date    Abnormal EEG 03/16/2024    3/16/2024 "Impression:   This is an abnormal awake and sleep vEEG consistent with a moderate diffuse encephalopathy and multifocal regions of cortical irritability that is most prominent in the right > left hemisphere.  No clinical events or electrographic seizures are recorded.  "    Acute on chronic pancreatitis 04/03/2016    Bacterial pneumonia 02/29/2020    Cavitary lung lesions  Parapneumonic effusion/ EMPYEMA    CHF (congestive heart failure)     Closed compression fracture of body of L1 vertebra 05/09/2020    COPD (chronic obstructive pulmonary disease) 08/08/2020    DM (diabetes mellitus), type 1     c/b DKA    HCV (hepatitis C virus)     08/2023 count < 12    HTN (hypertension)     Hx of psychiatric care     Stroke     R sided deficits    Substance abuse     IVDU heroin, methamphetamines     Past Surgical History: "   Procedure Laterality Date    CARDIAC SURGERY      stent placed. ([facility])    ESOPHAGOGASTRODUODENOSCOPY N/A 3/5/2020    Gastritis.  No H pylori.  Completed PPI for 1 month.  Procedure: EGD (ESOPHAGOGASTRODUODENOSCOPY);  Surgeon: Brinda Rene MD;  Location: [facility] ENDO;  Service: Endoscopy;  Laterality: N/A;  [id number] A; [phone]    LEFT HEART CATHETERIZATION Left 2020    Procedure: Left heart cath;  Surgeon: Fito Rangel MD;  Location: [facility] CATH LAB;  Service: Cardiology;  Laterality: Left;    SHOULDER SURGERY      right shoulder, spur removal.     Family History   Problem Relation Name Age of Onset    Asthma Mother       Social History     Tobacco Use    Smoking status: Former     Current packs/day: 0.00     Average packs/day: 1 pack/day for 39.2 years (39.2 ttl pk-yrs)     Types: Cigarettes     Start date: 4/3/1981     Quit date: 2020     Years since quittin.3    Smokeless tobacco: Never    Tobacco comments:     states approx 1 per day.   Substance Use Topics    Alcohol use: Not Currently     Alcohol/week: 0.0 standard drinks of alcohol    Drug use: Yes     Types: Heroin, IV, Marijuana, Amphetamines     Comment: chronic methamphetamine user     Review of Systems   Constitutional:  Positive for fatigue. Negative for chills and fever.   HENT:  Negative for congestion, rhinorrhea and sore throat.    Eyes:  Negative for visual disturbance.   Respiratory:  Negative for cough and shortness of breath.    Cardiovascular:  Negative for chest pain.   Gastrointestinal:  Positive for abdominal pain. Negative for diarrhea, nausea and vomiting.   Genitourinary:  Negative for dysuria, frequency and hematuria.   Musculoskeletal:  Negative for back pain.   Skin:  Negative for rash.   Neurological:  Negative for dizziness, weakness and headaches.       Physical Exam     Initial Vitals [24 1547]   BP Pulse Resp Temp SpO2   120/68 (!) 114 (!) 22 100.3 °F (37.9 °C) (!) 93 %      MAP       --          Physical Exam    Nursing note and vitals reviewed.  Constitutional: He appears well-developed and well-nourished.   HENT:   Head: Normocephalic and atraumatic.   Eyes: EOM are normal. Pupils are equal, round, and reactive to light.   Neck: Neck supple. No thyromegaly present. No JVD present.   Normal range of motion.  Cardiovascular:  Normal rate and regular rhythm.     Exam reveals no gallop and no friction rub.       No murmur heard.  Pulmonary/Chest: Breath sounds normal. No respiratory distress.   Coughing and congestion in chest. +rhonchi. Tachypnea. RR in mid 20's. SpO2 of 93% RA.    Abdominal: Abdomen is soft. Bowel sounds are normal. There is no abdominal tenderness.   Musculoskeletal:         General: No tenderness or edema. Normal range of motion.      Cervical back: Normal range of motion and neck supple.      Comments: No BLE edema      Neurological: He is alert and oriented to person, place, and time. He has normal strength. GCS score is 15. GCS eye subscore is 4. GCS verbal subscore is 5. GCS motor subscore is 6.   Skin: Skin is warm. Capillary refill takes less than 2 seconds.   Psychiatric: He has a normal mood and affect. His behavior is normal. Thought content normal.         ED Course   Critical Care    Date/Time: 9/29/2024 7:44 PM    Performed by: Mat Sanchez MD  Authorized by: Mat Sanchez MD  Direct patient critical care time: 20 minutes  Additional history critical care time: 5 minutes  Ordering / reviewing critical care time: 10 minutes  Documentation critical care time: 10 minutes  Consulting other physicians critical care time: 10 minutes  Total critical care time (exclusive of procedural time) : 55 minutes  Critical care time was exclusive of teaching time and separately billable procedures and treating other patients.  Critical care was necessary to treat or prevent imminent or life-threatening deterioration of the following conditions: endocrine crisis.  Critical  care was time spent personally by me on the following activities: development of treatment plan with patient or surrogate, discussions with consultants, evaluation of patient's response to treatment, examination of patient, obtaining history from patient or surrogate, ordering and performing treatments and interventions, ordering and review of laboratory studies, ordering and review of radiographic studies, pulse oximetry, re-evaluation of patient's condition and review of old charts.        Labs Reviewed   CBC W/ AUTO DIFFERENTIAL - Abnormal       Result Value    WBC 12.85 (*)     RBC 5.07      Hemoglobin 12.3 (*)     Hematocrit 39.6 (*)     MCV 78 (*)     MCH 24.3 (*)     MCHC 31.1 (*)     RDW 15.6 (*)     Platelets 369      MPV 10.1      Immature Granulocytes 0.5      Gran # (ANC) 10.9 (*)     Immature Grans (Abs) 0.07 (*)     Lymph # 0.8 (*)     Mono # 1.1 (*)     Eos # 0.0      Baso # 0.02      nRBC 0      Gran % 84.8 (*)     Lymph % 5.9 (*)     Mono % 8.3      Eosinophil % 0.3      Basophil % 0.2      Differential Method Automated     COMPREHENSIVE METABOLIC PANEL - Abnormal    Sodium 128 (*)     Potassium 4.4      Chloride 94 (*)     CO2 19 (*)     Glucose 472 (*)     BUN 25 (*)     Creatinine 1.4      Calcium 9.0      Total Protein 7.6      Albumin 3.0 (*)     Total Bilirubin 0.6      Alkaline Phosphatase 100      AST 19      ALT 17      eGFR 58 (*)     Anion Gap 15      Narrative:        Glucose critical result(s) called and verbal readback obtained   from   José ARMAS RN  by CD4 09/29/2024 16:54   BETA - HYDROXYBUTYRATE, SERUM - Abnormal    Beta-Hydroxybutyrate 0.7 (*)    POCT GLUCOSE - Abnormal    POCT Glucose 500 (*)    ISTAT PROCEDURE - Abnormal    POC PH 7.387      POC PCO2 42.9      POC PO2 35 (*)     POC HCO3 25.8      POC BE 0      POC SATURATED O2 66      POC TCO2 27      Sample VENOUS      Site Liam/UAC      Allens Test N/A     POCT GLUCOSE - Abnormal    POCT Glucose 385 (*)    CULTURE, BLOOD    CULTURE, BLOOD   CULTURE, RESPIRATORY   LACTIC ACID, PLASMA    Lactate (Lactic Acid) 1.4     PROTIME-INR    Prothrombin Time 10.9      INR 1.0     TROPONIN I    Troponin I 0.008     B-TYPE NATRIURETIC PEPTIDE    BNP 18     MAGNESIUM    Magnesium 1.9     LIPASE    Lipase 52     URINALYSIS, REFLEX TO URINE CULTURE   DRUG SCREEN PANEL, URINE EMERGENCY   SARS-COV-2 RDRP GENE    POC Rapid COVID Negative       Acceptable Yes            Imaging Results              X-Ray Chest AP Portable (Final result)  Result time 09/29/24 17:26:09      Final result by Melva Mena MD (09/29/24 17:26:09)                   Impression:      Patchy densities in the right lower lung field.  Findings may be related to a pneumonic process versus atelectatic change.      Electronically signed by: Melva Mena  Date:    09/29/2024  Time:    17:26               Narrative:    EXAMINATION:  AP PORTABLE CHEST    CLINICAL HISTORY:  sob;    TECHNIQUE:  AP portable chest radiograph was submitted.    COMPARISON:  08/08/2024    FINDINGS:  AP portable chest radiograph demonstrates a cardiac silhouette within normal limits.  There is subtle patchy density in the lower right lung field.  There is no pneumothorax or pleural effusion.                                       Medications   azithromycin (ZITHROMAX) 500 mg in D5W 250 mL  IVPB (admixture device) (has no administration in time range)   sodium chloride 0.9% bolus 1,000 mL 1,000 mL (1,000 mLs Intravenous New Bag 9/29/24 0192)   sodium chloride 0.9% flush 10 mL (has no administration in time range)   naloxone 0.4 mg/mL injection 0.02 mg (has no administration in time range)   glucose chewable tablet 16 g (has no administration in time range)   glucose chewable tablet 24 g (has no administration in time range)   glucagon (human recombinant) injection 1 mg (has no administration in time range)   0.9%  NaCl infusion (has no administration in time range)   heparin (porcine)  injection 5,000 Units (has no administration in time range)   acetaminophen tablet 650 mg (has no administration in time range)   morphine injection 2 mg (has no administration in time range)   morphine injection 4 mg (has no administration in time range)   senna-docusate 8.6-50 mg per tablet 1 tablet (has no administration in time range)   ondansetron injection 4 mg (has no administration in time range)   acetaminophen tablet 650 mg (has no administration in time range)   insulin aspart U-100 pen 0-10 Units (has no administration in time range)   dextrose 10% bolus 125 mL 125 mL (has no administration in time range)   dextrose 10% bolus 250 mL 250 mL (has no administration in time range)   mupirocin 2 % ointment (has no administration in time range)   azithromycin (ZITHROMAX) 500 mg in D5W 250 mL  IVPB (admixture device) (has no administration in time range)   cefTRIAXone (ROCEPHIN) 2 g in D5W 100 mL IVPB (MB+) (has no administration in time range)   aspirin chewable tablet 81 mg (has no administration in time range)   atorvastatin tablet 20 mg (has no administration in time range)   insulin glargine U-100 (Lantus) pen 10 Units (has no administration in time range)   umeclidinium-vilanteroL 62.5-25 mcg/actuation DsDv 1 puff (has no administration in time range)   venlafaxine 24 hr capsule 75 mg (has no administration in time range)   albuterol sulfate nebulizer solution 2.5 mg (has no administration in time range)   sodium chloride 0.9% bolus 1,000 mL 1,000 mL (0 mLs Intravenous Stopped 9/29/24 1717)   insulin regular injection 6 Units 0.06 mL (6 Units Intravenous Given 9/29/24 1720)   cefTRIAXone (Rocephin) 1 g in D5W 100 mL IVPB (MB+) (1 g Intravenous New Bag 9/29/24 1848)     Medical Decision Making  58 yo M with PMHx as above presenting to the ED for fatigue. Patient currently a poor historian and only reporting abdominal pain. Accucheck was initially 470 upon arrival. On exam, patient does not have abdominal  tenderness. He was febrile on arrival around 100.3 F, no fevers at home. Patient with rhonchi, cough and chest congestion on exam. Patient was tachypneic with RR of mid 20's, and spO2 of 93% upon arrival. Differential diagnosis includes, but is not limited to:  Poorly controlled diabetes mellitus, DKA, HHS, infectious cause, reactive hyperglycemia due to underlying medical condition such as myocardial infarction, CVA, lack of compliance with medication or dietary regimen. Will order labs, EKG, CXR and reassess.     Amount and/or Complexity of Data Reviewed  Labs: ordered. Decision-making details documented in ED Course.  Radiology: ordered. Decision-making details documented in ED Course.  ECG/medicine tests: ordered and independent interpretation performed. Decision-making details documented in ED Course.    Risk  OTC drugs.  Decision regarding hospitalization.            Scribe Attestation:   Scribe #1: I performed the above scribed service and the documentation accurately describes the services I performed. I attest to the accuracy of the note.        ED Course as of 09/29/24 1943   Sun Sep 29, 2024   1943 POCT Glucose(!): 385 [JL]   1943 Glucose(!!): 472 [JL]   1943 Beta-Hydroxybutyrate(!): 0.7 [JL]   1943 WBC(!): 12.85 [JL]      ED Course User Index  [JL] Mariah Carvalho, ***, personally performed the services described in this documentation. All medical record entries made by the scribe were at my direction and in my presence. I have reviewed the chart and agree that the record reflects my personal performance and is accurate and complete.      DISCLAIMER: This note was prepared with [employer] voice recognition transcription software. Garbled syntax, mangled pronouns, and other bizarre constructions may be attributed to that software system.      Clinical Impression:  Final diagnoses:  [R53.1] Weakness  [J18.9] Pneumonia          ED Disposition Condition    Admit

## 2024-09-30 LAB
ALBUMIN SERPL BCP-MCNC: 2.6 G/DL (ref 3.5–5.2)
ALP SERPL-CCNC: 87 U/L (ref 55–135)
ALT SERPL W/O P-5'-P-CCNC: 14 U/L (ref 10–44)
AMPHET+METHAMPHET UR QL: NEGATIVE
ANION GAP SERPL CALC-SCNC: 10 MMOL/L (ref 8–16)
AST SERPL-CCNC: 15 U/L (ref 10–40)
BACTERIA #/AREA URNS HPF: ABNORMAL /HPF
BARBITURATES UR QL SCN>200 NG/ML: NEGATIVE
BASOPHILS # BLD AUTO: 0.02 K/UL (ref 0–0.2)
BASOPHILS NFR BLD: 0.2 % (ref 0–1.9)
BENZODIAZ UR QL SCN>200 NG/ML: NEGATIVE
BILIRUB SERPL-MCNC: 0.2 MG/DL (ref 0.1–1)
BILIRUB UR QL STRIP: NEGATIVE
BUN SERPL-MCNC: 14 MG/DL (ref 6–20)
BZE UR QL SCN: NEGATIVE
CALCIUM SERPL-MCNC: 8.6 MG/DL (ref 8.7–10.5)
CANNABINOIDS UR QL SCN: NEGATIVE
CHLORIDE SERPL-SCNC: 104 MMOL/L (ref 95–110)
CLARITY UR: CLEAR
CO2 SERPL-SCNC: 22 MMOL/L (ref 23–29)
COLOR UR: COLORLESS
CREAT SERPL-MCNC: 0.8 MG/DL (ref 0.5–1.4)
CREAT UR-MCNC: 21.1 MG/DL (ref 23–375)
DIFFERENTIAL METHOD BLD: ABNORMAL
EOSINOPHIL # BLD AUTO: 0.1 K/UL (ref 0–0.5)
EOSINOPHIL NFR BLD: 1.1 % (ref 0–8)
ERYTHROCYTE [DISTWIDTH] IN BLOOD BY AUTOMATED COUNT: 15.4 % (ref 11.5–14.5)
EST. GFR  (NO RACE VARIABLE): >60 ML/MIN/1.73 M^2
GLUCOSE SERPL-MCNC: 208 MG/DL (ref 70–110)
GLUCOSE UR QL STRIP: ABNORMAL
HCT VFR BLD AUTO: 34.6 % (ref 40–54)
HGB BLD-MCNC: 10.6 G/DL (ref 14–18)
HGB UR QL STRIP: ABNORMAL
IMM GRANULOCYTES # BLD AUTO: 0.06 K/UL (ref 0–0.04)
IMM GRANULOCYTES NFR BLD AUTO: 0.5 % (ref 0–0.5)
KETONES UR QL STRIP: NEGATIVE
LEUKOCYTE ESTERASE UR QL STRIP: ABNORMAL
LYMPHOCYTES # BLD AUTO: 1 K/UL (ref 1–4.8)
LYMPHOCYTES NFR BLD: 8.9 % (ref 18–48)
MAGNESIUM SERPL-MCNC: 1.9 MG/DL (ref 1.6–2.6)
MCH RBC QN AUTO: 23.5 PG (ref 27–31)
MCHC RBC AUTO-ENTMCNC: 30.6 G/DL (ref 32–36)
MCV RBC AUTO: 77 FL (ref 82–98)
METHADONE UR QL SCN>300 NG/ML: NEGATIVE
MICROSCOPIC COMMENT: ABNORMAL
MONOCYTES # BLD AUTO: 1 K/UL (ref 0.3–1)
MONOCYTES NFR BLD: 8.8 % (ref 4–15)
NEUTROPHILS # BLD AUTO: 9.1 K/UL (ref 1.8–7.7)
NEUTROPHILS NFR BLD: 80.5 % (ref 38–73)
NITRITE UR QL STRIP: POSITIVE
NRBC BLD-RTO: 0 /100 WBC
OPIATES UR QL SCN: NEGATIVE
PCP UR QL SCN>25 NG/ML: NEGATIVE
PH UR STRIP: 7 [PH] (ref 5–8)
PLATELET # BLD AUTO: 344 K/UL (ref 150–450)
PMV BLD AUTO: 10.1 FL (ref 9.2–12.9)
POCT GLUCOSE: 300 MG/DL (ref 70–110)
POCT GLUCOSE: 307 MG/DL (ref 70–110)
POTASSIUM SERPL-SCNC: 3.7 MMOL/L (ref 3.5–5.1)
PROT SERPL-MCNC: 6.6 G/DL (ref 6–8.4)
PROT UR QL STRIP: ABNORMAL
RBC # BLD AUTO: 4.51 M/UL (ref 4.6–6.2)
RBC #/AREA URNS HPF: 4 /HPF (ref 0–4)
SODIUM SERPL-SCNC: 136 MMOL/L (ref 136–145)
SP GR UR STRIP: 1.01 (ref 1–1.03)
TOXICOLOGY INFORMATION: ABNORMAL
URN SPEC COLLECT METH UR: ABNORMAL
UROBILINOGEN UR STRIP-ACNC: NEGATIVE EU/DL
WBC # BLD AUTO: 11.34 K/UL (ref 3.9–12.7)
WBC #/AREA URNS HPF: 26 /HPF (ref 0–5)
YEAST URNS QL MICRO: ABNORMAL

## 2024-09-30 PROCEDURE — 80053 COMPREHEN METABOLIC PANEL: CPT | Performed by: STUDENT IN AN ORGANIZED HEALTH CARE EDUCATION/TRAINING PROGRAM

## 2024-09-30 PROCEDURE — 21400001 HC TELEMETRY ROOM

## 2024-09-30 PROCEDURE — 94761 N-INVAS EAR/PLS OXIMETRY MLT: CPT

## 2024-09-30 PROCEDURE — 99900031 HC PATIENT EDUCATION (STAT)

## 2024-09-30 PROCEDURE — 63600175 PHARM REV CODE 636 W HCPCS: Performed by: STUDENT IN AN ORGANIZED HEALTH CARE EDUCATION/TRAINING PROGRAM

## 2024-09-30 PROCEDURE — 85025 COMPLETE CBC W/AUTO DIFF WBC: CPT | Performed by: STUDENT IN AN ORGANIZED HEALTH CARE EDUCATION/TRAINING PROGRAM

## 2024-09-30 PROCEDURE — 80307 DRUG TEST PRSMV CHEM ANLYZR: CPT | Performed by: STUDENT IN AN ORGANIZED HEALTH CARE EDUCATION/TRAINING PROGRAM

## 2024-09-30 PROCEDURE — 83735 ASSAY OF MAGNESIUM: CPT | Performed by: STUDENT IN AN ORGANIZED HEALTH CARE EDUCATION/TRAINING PROGRAM

## 2024-09-30 PROCEDURE — 97165 OT EVAL LOW COMPLEX 30 MIN: CPT

## 2024-09-30 PROCEDURE — 87086 URINE CULTURE/COLONY COUNT: CPT | Performed by: EMERGENCY MEDICINE

## 2024-09-30 PROCEDURE — 25000003 PHARM REV CODE 250: Performed by: STUDENT IN AN ORGANIZED HEALTH CARE EDUCATION/TRAINING PROGRAM

## 2024-09-30 PROCEDURE — 94640 AIRWAY INHALATION TREATMENT: CPT

## 2024-09-30 PROCEDURE — 97535 SELF CARE MNGMENT TRAINING: CPT

## 2024-09-30 PROCEDURE — 25000242 PHARM REV CODE 250 ALT 637 W/ HCPCS: Performed by: HOSPITALIST

## 2024-09-30 PROCEDURE — 92610 EVALUATE SWALLOWING FUNCTION: CPT

## 2024-09-30 PROCEDURE — 81000 URINALYSIS NONAUTO W/SCOPE: CPT | Mod: 59 | Performed by: EMERGENCY MEDICINE

## 2024-09-30 PROCEDURE — 97163 PT EVAL HIGH COMPLEX 45 MIN: CPT

## 2024-09-30 PROCEDURE — 36415 COLL VENOUS BLD VENIPUNCTURE: CPT | Performed by: STUDENT IN AN ORGANIZED HEALTH CARE EDUCATION/TRAINING PROGRAM

## 2024-09-30 RX ORDER — INSULIN GLARGINE 100 [IU]/ML
15 INJECTION, SOLUTION SUBCUTANEOUS 2 TIMES DAILY
Status: DISCONTINUED | OUTPATIENT
Start: 2024-09-30 | End: 2024-10-01 | Stop reason: HOSPADM

## 2024-09-30 RX ADMIN — HEPARIN SODIUM 5000 UNITS: 5000 INJECTION INTRAVENOUS; SUBCUTANEOUS at 09:09

## 2024-09-30 RX ADMIN — IPRATROPIUM BROMIDE AND ALBUTEROL SULFATE 3 ML: 2.5; .5 SOLUTION RESPIRATORY (INHALATION) at 07:09

## 2024-09-30 RX ADMIN — MUPIROCIN: 20 OINTMENT TOPICAL at 09:09

## 2024-09-30 RX ADMIN — AZITHROMYCIN MONOHYDRATE 500 MG: 500 INJECTION, POWDER, LYOPHILIZED, FOR SOLUTION INTRAVENOUS at 09:09

## 2024-09-30 RX ADMIN — ATORVASTATIN CALCIUM 20 MG: 10 TABLET, FILM COATED ORAL at 09:09

## 2024-09-30 RX ADMIN — HEPARIN SODIUM 5000 UNITS: 5000 INJECTION INTRAVENOUS; SUBCUTANEOUS at 10:09

## 2024-09-30 RX ADMIN — VENLAFAXINE HYDROCHLORIDE 75 MG: 75 CAPSULE, EXTENDED RELEASE ORAL at 09:09

## 2024-09-30 RX ADMIN — INSULIN ASPART 3 UNITS: 100 INJECTION, SOLUTION INTRAVENOUS; SUBCUTANEOUS at 09:09

## 2024-09-30 RX ADMIN — CEFTRIAXONE 2 G: 2 INJECTION, POWDER, FOR SOLUTION INTRAMUSCULAR; INTRAVENOUS at 06:09

## 2024-09-30 RX ADMIN — ASPIRIN 81 MG CHEWABLE TABLET 81 MG: 81 TABLET CHEWABLE at 10:09

## 2024-09-30 RX ADMIN — INSULIN GLARGINE 15 UNITS: 100 INJECTION, SOLUTION SUBCUTANEOUS at 10:09

## 2024-09-30 RX ADMIN — INSULIN GLARGINE 15 UNITS: 100 INJECTION, SOLUTION SUBCUTANEOUS at 09:09

## 2024-09-30 RX ADMIN — INSULIN ASPART 8 UNITS: 100 INJECTION, SOLUTION INTRAVENOUS; SUBCUTANEOUS at 12:09

## 2024-09-30 RX ADMIN — ACETAMINOPHEN 650 MG: 325 TABLET ORAL at 09:09

## 2024-09-30 RX ADMIN — MUPIROCIN: 20 OINTMENT TOPICAL at 10:09

## 2024-09-30 NOTE — PLAN OF CARE
Problem: Occupational Therapy  Goal: Occupational Therapy Goal  Description: Goals to be met by: 10/14/2024     Patient will increase functional independence with ADLs by performing:    Feeding with Modified [place] and Set-up Assistance.  UE Dressing with Contact Guard Assistance.  Grooming while seated with Set-up Assistance and Supervision.  Sitting at edge of bed x25 minutes with Modified [place] and Supervision.  Rolling to Bilateral with Modified [place], Supervision, and use of bedrail as needed.   Supine to sit with Contact Guard Assistance and use of bedrail as needed.  Step transfer with Contact Guard Assistance  Upper extremity exercise program x10 reps per handout, with assistance as needed.    Outcome: Progressing   The patient will benefit from LIT and caregiver assist/(S).

## 2024-09-30 NOTE — PLAN OF CARE
Discharge Recommendations: Low Intensity   Problem: Physical Therapy  Goal: Physical Therapy Goal  Description: PT Eval and Discharge   Outcome: Met

## 2024-09-30 NOTE — PLAN OF CARE
Per mother Pt's baseline is puree with thin liquids. Pt reporting that is his preference. ST RECS: puree with thin liquids, following to ensure diet tolerance specifically of thin liquids. Becca Davis, CCC-SLP

## 2024-09-30 NOTE — PT/OT/SLP EVAL
Occupational Therapy Evaluation and Treatment    Name: Cali Mabry  MRN: [id number]  Admitting Diagnosis: Community acquired pneumonia of right lower lobe of lung  Recent Surgery: * No surgery found *      Recommendations:     Discharge Recommendations: Low Intensity Therapy (with caregiver assist)  Level of Assistance Recommended: 24 hours significant assistance  Discharge Equipment Recommendations:  (TBD)  Barriers to discharge:  (The patient requires assist for all self care and functional mobility with poor safety awareness.)    Assessment:     Cali Mabry is a 59 y.o. male with a medical diagnosis of Community acquired pneumonia of right lower lobe of lung. He presents with performance deficits affecting function including weakness, impaired endurance, impaired self care skills, impaired functional mobility, impaired balance, visual deficits, impaired cognition, decreased coordination, decreased upper extremity function, decreased lower extremity function, decreased safety awareness, pain, impaired fine motor, impaired cardiopulmonary response to activity.   The patient was cooperative with OT and able to follow simple commands. The patient was oriented to self only and frequently asked where his family went.  The patient appears to be a poor historian and was not able to give a functional history. The patient will benefit from an OT trial.  Rehab Prognosis: Fair; patient would benefit from acute OT services to address these deficits and reach maximum level of function.    Plan:     Patient to be seen 3 x/week to address the above listed problems via self-care/home management, therapeutic activities, therapeutic exercises  Plan of Care Expires: 10/14/24  Plan of Care Reviewed with: patient    Subjective     Chief Complaint: coughing  Patient Comments/Goals: agreeable to OT  Pain/Comfort:  Pain Rating 1:  (yes)  Location - Side 1: Left  Location 1: shoulder  Pain Addressed 1: Distraction, Cessation of  Activity    Patients cultural, spiritual, Caodaism conflicts given the current situation:      Social History:  Living Environment: Patient lives with their mother and niec  in a single story home   Prior Level of Function: Prior to admission, patient with undetermined level of function, patient is a poor historian and family was not present  Roles and Routines: un known  Equipment Used at Home:  (The patient denied use of AD but is a poor historian.)  DME owned (not currently used):  unknown  Assistance Upon Discharge:  mother and niece    Objective:     Communicated with nurseBernardo prior to session. Patient found HOB elevated with peripheral IV, pressure relief boots, telemetry upon OT entry to room.    General Precautions: Standard, fall, diabetic   Orthopedic Precautions: N/A   Braces: N/A    Respiratory Status: Room air    Occupational Performance    Gait belt applied - Yes    Bed Mobility:   Rolling/Turning to Right with contact guard assistance  Scooting anteriorly to EOB to have both feet planted on floor: moderate assistance and of 2 persons  Supine to sit from right side of bed with maximal assistance  Sit to Supine with maximal assistance on right side of bed    Functional Mobility/Transfers:  Sit <> Stand Transfer with moderate assistance and 2 persons with no AD and hand-held assist  Functional Mobility: The patient scooted along the EOB with mod assist x2    Activities of Daily Living:  Feeding: modified independence and supervision to drink water from a pitcher. The patient was able to retrieve the pitcher to drink from a straw but spilled water on his gown   Grooming: stand by assistance to wash his hands and face   Upper Body Dressing: moderate assist and VC to doff gown in bed and max assist to don gown    Cognitive/Visual Perceptual:  Cognitive/Psychosocial Skills:    -     Oriented to: Person, Not oriented to person, and Not oriented to time  -     Follows Commands/attention: Easily  "distracted and Follows one-step commands  -     Communication: clear/fluent  -     Memory: Impaired STM and Poor immediate recall. The patient repeatedly asked "where is my mother"   -     Safety awareness/insight to disability: impaired  -     Mood/Affect/Coping skills/emotional control: Appropriate to situation  Visual/Perceptual:    -     Intact    Physical Exam:  Balance:    -     Sitting: stand by assistance and contact guard assistance  -     Standing: moderate assistance and of 2 persons  Postural examination/scapula alignment:    -       Rounded shoulders  -       Forward head  Skin integrity: Visible skin intact  Edema:  None noted  Dominant hand: Right  Upper Extremity Range of Motion:     -       Right Upper Extremity: WFL except shoulder  -       Left Upper Extremity: WFL except shoulder  Upper Extremity Strength:    -       Right Upper Extremity: WFL  -       Left Upper Extremity: WFL   Strength:    -       Right Upper Extremity: WFL  -       Left Upper Extremity: WFL    AMPAC 6 Click ADL:  AMPAC Total Score: 15    Treatment & Education:  Patient educated on role of OT, POC, and goals for therapy  The patient sat on the EOB ~10 min with SBA/CGA with VC to use UE on the bed for support  Performed self care and functional mobility as noted above  Re-oriented the patient as needed      Patient left HOB elevated with all lines intact, call button in reach, RN notified, and bed alarm on.    GOALS:   Multidisciplinary Problems       Occupational Therapy Goals          Problem: Occupational Therapy    Goal Priority Disciplines Outcome Interventions   Occupational Therapy Goal     OT, PT/OT Progressing    Description: Goals to be met by: 10/14/2024     Patient will increase functional independence with ADLs by performing:    Feeding with Modified [place] and Set-up Assistance.  UE Dressing with Contact Guard Assistance.  Grooming while seated with Set-up Assistance and Supervision.  Sitting at edge of " "bed x25 minutes with Modified [place] and Supervision.  Rolling to Bilateral with Modified [place], Supervision, and use of bedrail as needed.   Supine to sit with Contact Guard Assistance and use of bedrail as needed.  Step transfer with Contact Guard Assistance  Upper extremity exercise program x10 reps per handout, with assistance as needed.                         History:     Past Medical History:   Diagnosis Date    Abnormal EEG 03/16/2024    3/16/2024 "Impression:   This is an abnormal awake and sleep vEEG consistent with a moderate diffuse encephalopathy and multifocal regions of cortical irritability that is most prominent in the right > left hemisphere.  No clinical events or electrographic seizures are recorded.  "    Acute on chronic pancreatitis 04/03/2016    Bacterial pneumonia 02/29/2020    Cavitary lung lesions  Parapneumonic effusion/ EMPYEMA    CHF (congestive heart failure)     Closed compression fracture of body of L1 vertebra 05/09/2020    COPD (chronic obstructive pulmonary disease) 08/08/2020    DM (diabetes mellitus), type 1     c/b DKA    HCV (hepatitis C virus)     08/2023 count < 12    HTN (hypertension)     Hx of psychiatric care     Stroke     R sided deficits    Substance abuse     IVDU heroin, methamphetamines         Past Surgical History:   Procedure Laterality Date    CARDIAC SURGERY  1999    stent placed. ([facility])    ESOPHAGOGASTRODUODENOSCOPY N/A 3/5/2020    Gastritis.  No H pylori.  Completed PPI for 1 month.  Procedure: EGD (ESOPHAGOGASTRODUODENOSCOPY);  Surgeon: Brinda Rene MD;  Location: [facility] ENDO;  Service: Endoscopy;  Laterality: N/A;  W421 A; x5445    LEFT HEART CATHETERIZATION Left 9/28/2020    Procedure: Left heart cath;  Surgeon: Fito Rangel MD;  Location: [facility] CATH LAB;  Service: Cardiology;  Laterality: Left;    SHOULDER SURGERY  2013    right shoulder, spur removal.       Time Tracking:     OT Date of Treatment: 09/30/24  OT Start " Time: 1344  OT Stop Time: 1405  OT Total Time (min): 21 min    Billable Minutes: Evaluation 12 and Self Care/Home Management 9    9/30/2024

## 2024-09-30 NOTE — SUBJECTIVE & OBJECTIVE
Interval History: ,leucocytosis is improving.  Hyperglycemia and hyponatremia is improving.  Consulted PT,OT,ST.    Review of Systems   Constitutional:  Positive for activity change and appetite change.   Respiratory:  Positive for shortness of breath. Negative for chest tightness.    Gastrointestinal:  Negative for abdominal distention.   Genitourinary:  Negative for difficulty urinating.   Neurological:  Positive for weakness.     Objective:     Vital Signs (Most Recent):  Temp: 98.4 °F (36.9 °C) (09/30/24 0755)  Pulse: 109 (09/30/24 0759)  Resp: 19 (09/30/24 0759)  BP: 134/83 (09/30/24 0755)  SpO2: (!) 91 % (09/30/24 0759) Vital Signs (24h Range):  Temp:  [98 °F (36.7 °C)-100.3 °F (37.9 °C)] 98.4 °F (36.9 °C)  Pulse:  [] 109  Resp:  [17-34] 19  SpO2:  [91 %-94 %] 91 %  BP: (120-166)/(64-83) 134/83     Weight: 61.2 kg (134 lb 14.7 oz)  Body mass index is 18.82 kg/m².    Intake/Output Summary (Last 24 hours) at 9/30/2024 1031  Last data filed at 9/30/2024 0810  Gross per 24 hour   Intake 1840 ml   Output 550 ml   Net 1290 ml         Physical Exam  Pulmonary:      Effort: No respiratory distress.   Abdominal:      General: There is no distension.   Musculoskeletal:         General: No swelling.   Skin:     General: Skin is warm.   Neurological:      Mental Status: He is alert.             Significant Labs: All pertinent labs within the past 24 hours have been reviewed.  BMP:   Recent Labs   Lab 09/30/24  0443   *      K 3.7      CO2 22*   BUN 14   CREATININE 0.8   CALCIUM 8.6*   MG 1.9     CBC:   Recent Labs   Lab 09/29/24 1617 09/30/24 0443   WBC 12.85* 11.34   HGB 12.3* 10.6*   HCT 39.6* 34.6*    344     CMP:   Recent Labs   Lab 09/29/24 1617 09/30/24 0443   * 136   K 4.4 3.7   CL 94* 104   CO2 19* 22*   * 208*   BUN 25* 14   CREATININE 1.4 0.8   CALCIUM 9.0 8.6*   PROT 7.6 6.6   ALBUMIN 3.0* 2.6*   BILITOT 0.6 0.2   ALKPHOS 100 87   AST 19 15   ALT 17 14   ANIONGAP  15 10       Significant Imaging: I have reviewed all pertinent imaging results/findings within the past 24 hours.

## 2024-09-30 NOTE — PLAN OF CARE
Case Management Assessment     PCP: Lawanda Flores NP   Pharmacy:   [id number] - [place], [place] - [address]  [address]  [place] [address]  Phone: [phone] Fax: [phone]    [facility] [place] [address] [facility]  [address]   [place] [address]  Phone: [phone] Fax: [phone]       Patient Arrived From: Home   Existing Help at Home: MotherMarilou [phone]    Barriers to Discharge: None     Discharge Plan:    A. Home with family    B. Home with family       CM met with pt at bedside to discuss discharge planning. Pt resides with his mother, Marilou and sister, Amarilis. CM also contacted pt's mother to discuss discharge planning.     Pt's does not use any DME at home. Pt's mother will provide needed support and pt may need transportation home upon discharge.        09/30/24 1209   Discharge Assessment   Assessment Type Discharge Planning Assessment   Confirmed/corrected address, phone number and insurance Yes   Confirmed Demographics Correct on Facesheet   Source of Information patient;family   When was your last doctors appointment? 09/19/24   Communicated ROLA with patient/caregiver Yes   People in Home parent(s)   Facility Arrived From: Home   Do you expect to return to your current living situation? Yes   Do you have help at home or someone to help you manage your care at home? Yes   Who are your caregiver(s) and their phone number(s)? Marilou Knox [phone]   Prior to hospitilization cognitive status: Alert/Oriented   Current cognitive status: Alert/Oriented   Walking or Climbing Stairs Difficulty no   Dressing/Bathing Difficulty no   Equipment Currently Used at Home none   Readmission within 30 days? No   Patient currently being followed by outpatient case management? No   Do you currently have service(s) that help you manage your care at home? No   Do you take prescription medications? Yes   Do you have prescription coverage?  Yes   Coverage [employer] Managed Medicare   Do you have any problems affording any of your prescribed medications? No   Is the patient taking medications as prescribed? yes   Are you on dialysis? No   Do you take coumadin? No   Discharge Plan A Home with family   Discharge Plan B Home with family   DME Needed Upon Discharge  none   Discharge Plan discussed with: Parent(s);Patient   Transition of Care Barriers None   SDOH   (None)   Physical Activity   On average, how many days per week do you engage in moderate to strenuous exercise (like a brisk walk)? 0 days   On average, how many minutes do you engage in exercise at this level? 0 min   Financial Resource Strain   How hard is it for you to pay for the very basics like food, housing, medical care, and heating? Not hard   Housing Stability   In the last 12 months, was there a time when you were not able to pay the mortgage or rent on time? N   At any time in the past 12 months, were you homeless or living in a shelter (including now)? N   Transportation Needs   Has the lack of transportation kept you from medical appointments, meetings, work or from getting things needed for daily living? No   Food Insecurity   Within the past 12 months, you worried that your food would run out before you got the money to buy more. Never true   Within the past 12 months, the food you bought just didn't last and you didn't have money to get more. Never true   Stress   Do you feel stress - tense, restless, nervous, or anxious, or unable to sleep at night because your mind is troubled all the time - these days? Not at all   Social Isolation   How often do you feel lonely or isolated from those around you?  Never   Alcohol Use   Q1: How often do you have a drink containing alcohol? Never   Q2: How many drinks containing alcohol do you have on a typical day when you are drinking? None   Q3: How often do you have six or more drinks on one occasion? Never

## 2024-09-30 NOTE — ASSESSMENT & PLAN NOTE
-Check A1c.    -Blood glucose 472 on presentation.  -Home regimen includes insulin Levemir and insulin lispro.  -Continue long-acting insulin.  Start sliding scale insulin.

## 2024-09-30 NOTE — ASSESSMENT & PLAN NOTE
-Presents with 2 days of worsening cough, fevers, chills, shortness of breath.    -WBC 12.8, chest x-ray with right lower lobe opacity.  COVID-19 negative.    -Given a dose of IV ceftriaxone and IV azithromycin and gout.    -Continue cap coverage with azithromycin and ceftriaxone.  -Follow blood cultures, sputum cultures.    -Continuous pulse oximetry.  Maintain O2 saturations 88-92%.

## 2024-09-30 NOTE — ASSESSMENT & PLAN NOTE
-Longstanding history of COPD.  Continues to smoke.  Decreased air entry bilaterally.  Currently on room air.    -Continue home inhalers.    -[place]ander Cam.    -Continuous pulse oximetry.    -Holding steroids due to labile blood sugars.

## 2024-09-30 NOTE — PT/OT/SLP EVAL
Physical Therapy Evaluation and Discharge Note    Patient Name:  Cali Mabry   MRN:  [id number]    Recommendations:     Discharge Recommendations: Low Intensity Therapy  Discharge Equipment Recommendations: none   Barriers to discharge: None    Assessment:     Cali Mabry is a 59 y.o. male admitted with a medical diagnosis of Community acquired pneumonia of right lower lobe of lung. .  At this time, patient is functioning at their prior level of function and does not require further acute PT services.     Recent Surgery: * No surgery found *      Plan:     During this hospitalization, patient does not require further acute PT services.  Please re-consult if situation changes.      Subjective     Chief Complaint: Pt states that he walks at home without use of AD   Patient/Family Comments/goals: Return Home  Pain/Comfort:  Pain Rating 1: 5/10  Location - Side 1: Left  Location 1: shoulder    Patients cultural, spiritual, Oriental orthodox conflicts given the current situation: no    Living Environment:  Lives with family   Prior to admission, patients level of function was unknown due to pt being a poor historian.  Equipment used at home: other (see comments) (unknow due to pt being a poor historian).  DME owned (not currently used): none.  Upon discharge, patient will have assistance from family.    Objective:     Communicated with nursing prior to session.  Patient found HOB elevated with pressure relief boots, telemetry upon PT entry to room.    General Precautions: Standard, fall    Orthopedic Precautions:N/A   Braces: N/A  Respiratory Status: Room air    Exams:  Cognitive Exam:  Patient is not oriented to Person, Place, Time, and Situation  Gross Motor Coordination:  Poor due to weakness and decreased strength   RLE ROM: WFL  RLE Strength: 3-/5  LLE ROM: WFL except L+ ankle DF - unable to achieve 0 deg neurtral  LLE Strength: 3-/5    Functional Mobility:  Bed Mobility:     Rolling Right: contact guard  "assistance  Bridging: minimum assistance  Supine to Sit: maximal assistance  Sit to Supine: maximal assistance  Transfers:     Sit to Stand:  moderate assistance and of 2 persons with rolling walker and without RW and HHA  Bed to Chair: unable  Gait: 2 steps however significantly reduced TATI and B+ feet nearly together in standing, therefore balance was compromised , knees and hip slowly began to flex and pt required assist to descend back onto bed of which he was standing in front of  Balance: Static Sit - Poor PT falls to the R+ in sitting    AM-PAC 6 CLICK MOBILITY  Total Score:12       Treatment and Education:  Eval    AM-PAC 6 CLICK MOBILITY  Total Score:12     Patient left HOB elevated with all lines intact, call button in reach, nursing notified, and OT present.    GOALS:   Multidisciplinary Problems       Physical Therapy Goals       Not on file              Multidisciplinary Problems (Resolved)          Problem: Physical Therapy    Goal Priority Disciplines Outcome Interventions   Physical Therapy Goal   (Resolved)     PT, PT/OT Met    Description: PT Eval and Discharge                        History:     Past Medical History:   Diagnosis Date    Abnormal EEG 03/16/2024    3/16/2024 "Impression:   This is an abnormal awake and sleep vEEG consistent with a moderate diffuse encephalopathy and multifocal regions of cortical irritability that is most prominent in the right > left hemisphere.  No clinical events or electrographic seizures are recorded.  "    Acute on chronic pancreatitis 04/03/2016    Bacterial pneumonia 02/29/2020    Cavitary lung lesions  Parapneumonic effusion/ EMPYEMA    CHF (congestive heart failure)     Closed compression fracture of body of L1 vertebra 05/09/2020    COPD (chronic obstructive pulmonary disease) 08/08/2020    DM (diabetes mellitus), type 1     c/b DKA    HCV (hepatitis C virus)     08/2023 count < 12    HTN (hypertension)     Hx of psychiatric care     Stroke     R sided " deficits    Substance abuse     IVDU heroin, methamphetamines       Past Surgical History:   Procedure Laterality Date    CARDIAC SURGERY  1999    stent placed. ([facility])    ESOPHAGOGASTRODUODENOSCOPY N/A 3/5/2020    Gastritis.  No H pylori.  Completed PPI for 1 month.  Procedure: EGD (ESOPHAGOGASTRODUODENOSCOPY);  Surgeon: Brinda Rene MD;  Location: [facility] ENDO;  Service: Endoscopy;  Laterality: N/A;  W421 A; x5445    LEFT HEART CATHETERIZATION Left 9/28/2020    Procedure: Left heart cath;  Surgeon: Fito Rangel MD;  Location: [facility] CATH LAB;  Service: Cardiology;  Laterality: Left;    SHOULDER SURGERY  2013    right shoulder, spur removal.       Time Tracking:     PT Received On:    PT Start Time: 1344     PT Stop Time: 1356  PT Total Time (min): 12 min     Billable Minutes: Evaluation 1      09/30/2024

## 2024-09-30 NOTE — ASSESSMENT & PLAN NOTE
-Sodium 128.  Corrected sodium 134 in the setting of blood glucose of 472.    -Gentle IV hydration.  Follow BMP.      Improving.

## 2024-09-30 NOTE — HPI
59-year-old male with PMH of CVA with right-sided residual deficits and slurred speech, COPD, type 1 diabetes mellitus, chronic hep C infection, hypotension, history of polysubstance abuse including heroin and meth, tobacco use disorder presented to ER with complaints of shortness of breath.      Patient is a very poor historian and is not able to provide appropriate history.  He initially stated that he is here for his high blood sugar and later stated that he is here because he was not feeling well.  He reported 3 day history of shortness of breath and cough and also reported associated fevers and chills.  He complains of mild chest pain with coughing episodes.  He denies any nausea, vomiting, dizziness, syncopal episodes, palpitations, abdominal pain, diarrhea, constipation.  He reports compliance with his home medications.  Of note he was admitted here in July 2024 with similar presentation and was treated for pneumonia.  He has not smoked in last 1 week but continues to smoke 1 pack per day otherwise.    On presentation to ER he was initially tachycardic with later his heart rate became controlled, lab work was significant for WBC 12.8, sodium 128, bicarb 19, blood glucose 472, troponin negative, COVID-19 negative, chest x-ray showed patchy opacification of right lower lobe.  He was given IV ceftriaxone and IV azithromycin in the ER.  He will be admitted to medicine service for further management.    PMH: CVA with right-sided deficits, COPD, type 1 diabetes mellitus, chronic hep C infection, hypotension, history of polysubstance abuse   PSH: None   SH:  Smokes 1 pack per day, history of heroin and meth abuse in the past.  Denies alcohol use.    FH: Reviewed and not pertinent.

## 2024-09-30 NOTE — CLINICAL REVIEW
IP Sepsis Screen (most recent)       Sepsis Screen (IP) - 09/30/24 0738       Is the patient's history or complaint suggestive of a possible infection? Yes  -    Are there at least two of the following signs and symptoms present? Yes  -    Sepsis signs/symptoms - Tachycardia Tachycardia     >90  -    Sepsis signs/symptoms - Altered Mental Status Altered Mental Status  -    Are any of the following organ dysfunction criteria present and not considered to be due to a chronic condition? Yes  -    Organ Dysfunction Criteria - O2 O2 Saturation < 95% on room air  -    Initiate Sepsis Protocol No  -    Reason sepsis not considered Pt. receiving appropriate management  -              User Key  (r) = Recorded By, (t) = Taken By, (c) = Cosigned By      Initials Name    Serina Avina RN                 BC x2-ngtd, UCx in process, on abx

## 2024-09-30 NOTE — ASSESSMENT & PLAN NOTE
Nutrition consulted. Most recent weight and BMI monitored-     Measurements:  Wt Readings from Last 1 Encounters:   09/29/24 61.2 kg (134 lb 14.7 oz)   Body mass index is 18.82 kg/m².    Patient has been screened and assessed by RD.    Malnutrition Type:  Context:    Level:      Malnutrition Characteristic Summary:       Interventions/Recommendations (treatment strategy):

## 2024-09-30 NOTE — SUBJECTIVE & OBJECTIVE
"Past Medical History:   Diagnosis Date    Abnormal EEG 03/16/2024    3/16/2024 "Impression:   This is an abnormal awake and sleep vEEG consistent with a moderate diffuse encephalopathy and multifocal regions of cortical irritability that is most prominent in the right > left hemisphere.  No clinical events or electrographic seizures are recorded.  "    Acute on chronic pancreatitis 04/03/2016    Bacterial pneumonia 02/29/2020    Cavitary lung lesions  Parapneumonic effusion/ EMPYEMA    CHF (congestive heart failure)     Closed compression fracture of body of L1 vertebra 05/09/2020    COPD (chronic obstructive pulmonary disease) 08/08/2020    DM (diabetes mellitus), type 1     c/b DKA    HCV (hepatitis C virus)     08/2023 count < 12    HTN (hypertension)     Hx of psychiatric care     Stroke     R sided deficits    Substance abuse     IVDU heroin, methamphetamines       Past Surgical History:   Procedure Laterality Date    CARDIAC SURGERY  1999    stent placed. ([facility])    ESOPHAGOGASTRODUODENOSCOPY N/A 3/5/2020    Gastritis.  No H pylori.  Completed PPI for 1 month.  Procedure: EGD (ESOPHAGOGASTRODUODENOSCOPY);  Surgeon: Brinda Rene MD;  Location: [facility] ENDO;  Service: Endoscopy;  Laterality: N/A;  W421 A; [id number]    LEFT HEART CATHETERIZATION Left 9/28/2020    Procedure: Left heart cath;  Surgeon: Fito Rangel MD;  Location: [facility] CATH LAB;  Service: Cardiology;  Laterality: Left;    SHOULDER SURGERY  2013    right shoulder, spur removal.       Review of patient's allergies indicates:  No Known Allergies    No current facility-administered medications on file prior to encounter.     Current Outpatient Medications on File Prior to Encounter   Medication Sig    albuterol (PROVENTIL HFA) 90 mcg/actuation inhaler Inhale 2 puffs into the lungs every 6 (six) hours as needed for Wheezing. Rescue    aspirin 81 MG Chew Take 1 tablet (81 mg total) by mouth once daily.    atorvastatin (LIPITOR) 20 MG tablet " "Take 1 tablet (20 mg total) by mouth once daily.    BD GERTRUDIS 2ND GEN PEN NEEDLE 32 gauge x 5/32" Ndle Inject into the skin 4 (four) times daily.    benzonatate (TESSALON) 100 MG capsule Take 1 capsule (100 mg total) by mouth 3 (three) times daily as needed for Cough.    folic acid (FOLVITE) 1 MG tablet Take 1 tablet (1 mg total) by mouth once daily.    FREESTYLE SEBASTIAN 2 SENSOR Kit USE FOUR TIMES PER WEEK AS DIRECTED    insulin detemir U-100, Levemir, 100 unit/mL (3 mL) SubQ InPn pen Inject 10 Units into the skin 2 (two) times daily. Inject 10 units under the skin in the morning and 10 units in the evening.    insulin lispro 100 unit/mL pen Inject 5 Units into the skin 3 (three) times daily before meals.    levocetirizine (XYZAL) 5 MG tablet Take 5 mg by mouth every evening.    naloxone (NARCAN) 4 mg/actuation Spry 4mg by nasal route as needed for opioid overdose; may repeat every 2-3 minutes in alternating nostrils until medical help arrives. Call 911 (Patient not taking: Reported on 2024)    umeclidinium-vilanteroL (ANORO ELLIPTA) 62.5-25 mcg/actuation DsDv Inhale 1 puff into the lungs once daily. Controller    venlafaxine (EFFEXOR-XR) 75 MG 24 hr capsule Take 1 capsule (75 mg total) by mouth every evening.    [DISCONTINUED] insulin aspart U-100 (NOVOLOG) 100 unit/mL injection Inject 1-10 Units into the skin 3 (three) times daily with meals.     Family History       Problem Relation (Age of Onset)    Asthma Mother          Tobacco Use    Smoking status: Former     Current packs/day: 0.00     Average packs/day: 1 pack/day for 39.2 years (39.2 ttl pk-yrs)     Types: Cigarettes     Start date: 4/3/1981     Quit date: 2020     Years since quittin.3    Smokeless tobacco: Never    Tobacco comments:     states approx 1 per day.   Substance and Sexual Activity    Alcohol use: Not Currently     Alcohol/week: 0.0 standard drinks of alcohol    Drug use: Yes     Types: Heroin, IV, Marijuana, Amphetamines     " Comment: chronic methamphetamine user    Sexual activity: Yes     Partners: Female     Review of Systems   Constitutional:  Positive for activity change (Decreased), chills, fatigue and fever.   HENT:  Positive for congestion.    Eyes:  Negative for visual disturbance.   Respiratory:  Positive for cough and shortness of breath. Negative for chest tightness.    Cardiovascular:  Positive for leg swelling. Negative for chest pain and palpitations.   Gastrointestinal:  Negative for abdominal pain, nausea and vomiting.   Endocrine: Negative.    Genitourinary:  Negative for dysuria.   Musculoskeletal:  Negative for back pain.   Skin: Negative.    Allergic/Immunologic: Negative.    Neurological:  Negative for dizziness and syncope.   Hematological: Negative.    Psychiatric/Behavioral:  Negative for agitation.      Objective:     Vital Signs (Most Recent):  Temp: 98.4 °F (36.9 °C) (09/29/24 1852)  Pulse: 91 (09/29/24 1717)  Resp: (!) 34 (09/29/24 1717)  BP: 127/64 (09/29/24 1717)  SpO2: (!) 93 % (09/29/24 1717) Vital Signs (24h Range):  Temp:  [98.3 °F (36.8 °C)-100.3 °F (37.9 °C)] 98.4 °F (36.9 °C)  Pulse:  [] 91  Resp:  [22-34] 34  SpO2:  [93 %-94 %] 93 %  BP: (120-140)/(64-70) 127/64     Weight: 57.6 kg (127 lb)  Body mass index is 17.71 kg/m².     Physical Exam  Vitals and nursing note reviewed.   Constitutional:       Appearance: He is ill-appearing.      Comments: Cachectic on appearance   HENT:      Head: Normocephalic and atraumatic.      Nose: Nose normal.      Mouth/Throat:      Mouth: Mucous membranes are dry.   Eyes:      Pupils: Pupils are equal, round, and reactive to light.   Cardiovascular:      Rate and Rhythm: Normal rate.      Pulses: Normal pulses.      Heart sounds: Normal heart sounds.   Pulmonary:      Effort: Pulmonary effort is normal. No respiratory distress.      Comments: Coarse breath sounds bilaterally.  Abdominal:      Palpations: Abdomen is soft.      Tenderness: There is no abdominal  "tenderness.   Musculoskeletal:         General: No swelling. Normal range of motion.   Skin:     General: Skin is warm.      Capillary Refill: Capillary refill takes less than 2 seconds.      Coloration: Skin is not jaundiced.   Neurological:      Mental Status: He is alert and oriented to person, place, and time. Mental status is at baseline.   Psychiatric:         Mood and Affect: Mood normal.              CRANIAL NERVES     CN III, IV, VI   Pupils are equal, round, and reactive to light.       Significant Labs: All pertinent labs within the past 24 hours have been reviewed.  Blood Culture: No results for input(s): "LABBLOO" in the last 48 hours.  CBC:   Recent Labs   Lab 09/29/24  1617   WBC 12.85*   HGB 12.3*   HCT 39.6*        CMP:   Recent Labs   Lab 09/29/24  1617   *   K 4.4   CL 94*   CO2 19*   *   BUN 25*   CREATININE 1.4   CALCIUM 9.0   PROT 7.6   ALBUMIN 3.0*   BILITOT 0.6   ALKPHOS 100   AST 19   ALT 17   ANIONGAP 15     Coagulation:   Recent Labs   Lab 09/29/24  1617   INR 1.0     Lactic Acid:   Recent Labs   Lab 09/29/24  1617   LACTATE 1.4       Significant Imaging: I have reviewed all pertinent imaging results/findings within the past 24 hours.  "

## 2024-09-30 NOTE — PLAN OF CARE
Problem: Adult Inpatient Plan of Care  Goal: Plan of Care Review  Outcome: Progressing  Flowsheets (Taken 9/30/2024 1627)  Plan of Care Reviewed With: patient  Goal: Absence of Hospital-Acquired Illness or Injury  Outcome: Progressing  Intervention: Identify and Manage Fall Risk  Flowsheets (Taken 9/30/2024 1627)  Safety Promotion/Fall Prevention:   bed alarm set   Fall Risk reviewed with patient/family   instructed to call staff for mobility   medications reviewed   side rails raised x 2  Intervention: Prevent Skin Injury  Flowsheets (Taken 9/30/2024 1627)  Body Position: position changed independently  Device Skin Pressure Protection: adhesive use limited  Intervention: Prevent Infection  Flowsheets (Taken 9/30/2024 1627)  Infection Prevention:   hand hygiene promoted   single patient room provided   rest/sleep promoted  Goal: Optimal Comfort and Wellbeing  Outcome: Progressing  Intervention: Monitor Pain and Promote Comfort  Flowsheets (Taken 9/30/2024 1627)  Pain Management Interventions:   care clustered   pain management plan reviewed with patient/caregiver   pillow support provided  Intervention: Provide Person-Centered Care  Flowsheets (Taken 9/30/2024 1627)  Trust Relationship/Rapport:   care explained   choices provided   emotional support provided   empathic listening provided   questions answered   questions encouraged   reassurance provided   thoughts/feelings acknowledged

## 2024-09-30 NOTE — H&P
[facility]  [facility] Medicine  History & Physical    Patient Name: Cali Mabry  MRN: [id number]  Patient Class: IP- Inpatient  Admission Date: 9/29/2024  Attending Physician:  Leela Costa MD  Primary Care Provider: Lawanda Flores NP         Patient information was obtained from patient and ER records.     Subjective:     Principal Problem:Community acquired pneumonia of right lower lobe of lung    Chief Complaint:   Chief Complaint   Patient presents with    Hyperglycemia     Pt BIB EMS, with a family c/o fatigue x 2 days. Pt's CBG is 435 mg/dL, per EMS. Pt poor historian and doesn't answer questions well.         HPI: 59-year-old male with PMH of CVA with right-sided residual deficits and slurred speech, COPD, type 1 diabetes mellitus, chronic hep C infection, hypotension, history of polysubstance abuse including heroin and meth, tobacco use disorder presented to ER with complaints of shortness of breath.      Patient is a very poor historian and is not able to provide appropriate history.  He initially stated that he is here for his high blood sugar and later stated that he is here because he was not feeling well.  He reported 3 day history of shortness of breath and cough and also reported associated fevers and chills.  He complains of mild chest pain with coughing episodes.  He denies any nausea, vomiting, dizziness, syncopal episodes, palpitations, abdominal pain, diarrhea, constipation.  He reports compliance with his home medications.  Of note he was admitted here in July 2024 with similar presentation and was treated for pneumonia.  He has not smoked in last 1 week but continues to smoke 1 pack per day otherwise.    On presentation to ER he was initially tachycardic with later his heart rate became controlled, lab work was significant for WBC 12.8, sodium 128, bicarb 19, blood glucose 472, troponin negative, COVID-19 negative, chest x-ray showed patchy opacification of  "right lower lobe.  He was given IV ceftriaxone and IV azithromycin in the ER.  He will be admitted to medicine service for further management.    PMH: CVA with right-sided deficits, COPD, type 1 diabetes mellitus, chronic hep C infection, hypotension, history of polysubstance abuse   PSH: None   SH:  Smokes 1 pack per day, history of heroin and meth abuse in the past.  Denies alcohol use.    FH: Reviewed and not pertinent.    Past Medical History:   Diagnosis Date    Abnormal EEG 03/16/2024    3/16/2024 "Impression:   This is an abnormal awake and sleep vEEG consistent with a moderate diffuse encephalopathy and multifocal regions of cortical irritability that is most prominent in the right > left hemisphere.  No clinical events or electrographic seizures are recorded.  "    Acute on chronic pancreatitis 04/03/2016    Bacterial pneumonia 02/29/2020    Cavitary lung lesions  Parapneumonic effusion/ EMPYEMA    CHF (congestive heart failure)     Closed compression fracture of body of L1 vertebra 05/09/2020    COPD (chronic obstructive pulmonary disease) 08/08/2020    DM (diabetes mellitus), type 1     c/b DKA    HCV (hepatitis C virus)     08/2023 count < 12    HTN (hypertension)     Hx of psychiatric care     Stroke     R sided deficits    Substance abuse     IVDU heroin, methamphetamines       Past Surgical History:   Procedure Laterality Date    CARDIAC SURGERY  1999    stent placed. ([facility])    ESOPHAGOGASTRODUODENOSCOPY N/A 3/5/2020    Gastritis.  No H pylori.  Completed PPI for 1 month.  Procedure: EGD (ESOPHAGOGASTRODUODENOSCOPY);  Surgeon: Brinda Rene MD;  Location: [facility] ENDO;  Service: Endoscopy;  Laterality: N/A;  W421 A; [id number]    LEFT HEART CATHETERIZATION Left 9/28/2020    Procedure: Left heart cath;  Surgeon: Fito Rangel MD;  Location: [facility] CATH LAB;  Service: Cardiology;  Laterality: Left;    SHOULDER SURGERY  2013    right shoulder, spur removal.       Review of patient's allergies " "indicates:  No Known Allergies    No current facility-administered medications on file prior to encounter.     Current Outpatient Medications on File Prior to Encounter   Medication Sig    albuterol (PROVENTIL HFA) 90 mcg/actuation inhaler Inhale 2 puffs into the lungs every 6 (six) hours as needed for Wheezing. Rescue    aspirin 81 MG Chew Take 1 tablet (81 mg total) by mouth once daily.    atorvastatin (LIPITOR) 20 MG tablet Take 1 tablet (20 mg total) by mouth once daily.    BD GERTRUDIS 2ND GEN PEN NEEDLE 32 gauge x 5/32" Ndle Inject into the skin 4 (four) times daily.    benzonatate (TESSALON) 100 MG capsule Take 1 capsule (100 mg total) by mouth 3 (three) times daily as needed for Cough.    folic acid (FOLVITE) 1 MG tablet Take 1 tablet (1 mg total) by mouth once daily.    FREESTYLE SEBASTIAN 2 SENSOR Kit USE FOUR TIMES PER WEEK AS DIRECTED    insulin detemir U-100, Levemir, 100 unit/mL (3 mL) SubQ InPn pen Inject 10 Units into the skin 2 (two) times daily. Inject 10 units under the skin in the morning and 10 units in the evening.    insulin lispro 100 unit/mL pen Inject 5 Units into the skin 3 (three) times daily before meals.    levocetirizine (XYZAL) 5 MG tablet Take 5 mg by mouth every evening.    naloxone (NARCAN) 4 mg/actuation Spry 4mg by nasal route as needed for opioid overdose; may repeat every 2-3 minutes in alternating nostrils until medical help arrives. Call 911 (Patient not taking: Reported on 8/5/2024)    umeclidinium-vilanteroL (ANORO ELLIPTA) 62.5-25 mcg/actuation DsDv Inhale 1 puff into the lungs once daily. Controller    venlafaxine (EFFEXOR-XR) 75 MG 24 hr capsule Take 1 capsule (75 mg total) by mouth every evening.    [DISCONTINUED] insulin aspart U-100 (NOVOLOG) 100 unit/mL injection Inject 1-10 Units into the skin 3 (three) times daily with meals.     Family History       Problem Relation (Age of Onset)    Asthma Mother          Tobacco Use    Smoking status: Former     Current packs/day: 0.00 "     Average packs/day: 1 pack/day for 39.2 years (39.2 ttl pk-yrs)     Types: Cigarettes     Start date: 4/3/1981     Quit date: 2020     Years since quittin.3    Smokeless tobacco: Never    Tobacco comments:     states approx 1 per day.   Substance and Sexual Activity    Alcohol use: Not Currently     Alcohol/week: 0.0 standard drinks of alcohol    Drug use: Yes     Types: Heroin, IV, Marijuana, Amphetamines     Comment: chronic methamphetamine user    Sexual activity: Yes     Partners: Female     Review of Systems   Constitutional:  Positive for activity change (Decreased), chills, fatigue and fever.   HENT:  Positive for congestion.    Eyes:  Negative for visual disturbance.   Respiratory:  Positive for cough and shortness of breath. Negative for chest tightness.    Cardiovascular:  Positive for leg swelling. Negative for chest pain and palpitations.   Gastrointestinal:  Negative for abdominal pain, nausea and vomiting.   Endocrine: Negative.    Genitourinary:  Negative for dysuria.   Musculoskeletal:  Negative for back pain.   Skin: Negative.    Allergic/Immunologic: Negative.    Neurological:  Negative for dizziness and syncope.   Hematological: Negative.    Psychiatric/Behavioral:  Negative for agitation.      Objective:     Vital Signs (Most Recent):  Temp: 98.4 °F (36.9 °C) (24)  Pulse: 91 (24)  Resp: (!) 34 (24)  BP: 127/64 (24)  SpO2: (!) 93 % (24) Vital Signs (24h Range):  Temp:  [98.3 °F (36.8 °C)-100.3 °F (37.9 °C)] 98.4 °F (36.9 °C)  Pulse:  [] 91  Resp:  [22-34] 34  SpO2:  [93 %-94 %] 93 %  BP: (120-140)/(64-70) 127/64     Weight: 57.6 kg (127 lb)  Body mass index is 17.71 kg/m².     Physical Exam  Vitals and nursing note reviewed.   Constitutional:       Appearance: He is ill-appearing.      Comments: Cachectic on appearance   HENT:      Head: Normocephalic and atraumatic.      Nose: Nose normal.      Mouth/Throat:      Mouth:  "Mucous membranes are dry.   Eyes:      Pupils: Pupils are equal, round, and reactive to light.   Cardiovascular:      Rate and Rhythm: Normal rate.      Pulses: Normal pulses.      Heart sounds: Normal heart sounds.   Pulmonary:      Effort: Pulmonary effort is normal. No respiratory distress.      Comments: Coarse breath sounds bilaterally.  Abdominal:      Palpations: Abdomen is soft.      Tenderness: There is no abdominal tenderness.   Musculoskeletal:         General: No swelling. Normal range of motion.   Skin:     General: Skin is warm.      Capillary Refill: Capillary refill takes less than 2 seconds.      Coloration: Skin is not jaundiced.   Neurological:      Mental Status: He is alert and oriented to person, place, and time. Mental status is at baseline.   Psychiatric:         Mood and Affect: Mood normal.              CRANIAL NERVES     CN III, IV, VI   Pupils are equal, round, and reactive to light.       Significant Labs: All pertinent labs within the past 24 hours have been reviewed.  Blood Culture: No results for input(s): "LABBLOO" in the last 48 hours.  CBC:   Recent Labs   Lab 09/29/24  1617   WBC 12.85*   HGB 12.3*   HCT 39.6*        CMP:   Recent Labs   Lab 09/29/24  1617   *   K 4.4   CL 94*   CO2 19*   *   BUN 25*   CREATININE 1.4   CALCIUM 9.0   PROT 7.6   ALBUMIN 3.0*   BILITOT 0.6   ALKPHOS 100   AST 19   ALT 17   ANIONGAP 15     Coagulation:   Recent Labs   Lab 09/29/24  1617   INR 1.0     Lactic Acid:   Recent Labs   Lab 09/29/24  1617   LACTATE 1.4       Significant Imaging: I have reviewed all pertinent imaging results/findings within the past 24 hours.  Assessment/Plan:     * Community acquired pneumonia of right lower lobe of lung  -Presents with 2 days of worsening cough, fevers, chills, shortness of breath.    -WBC 12.8, chest x-ray with right lower lobe opacity.  COVID-19 negative.    -Given a dose of IV ceftriaxone and IV azithromycin and gout.    -Continue cap " coverage with azithromycin and ceftriaxone.  -Follow blood cultures, sputum cultures.    -Continuous pulse oximetry.  Maintain O2 saturations 88-92%.      Hyponatremia  -Sodium 128.  Corrected sodium 134 in the setting of blood glucose of 472.    -Gentle IV hydration.  Follow BMP.        Diabetes mellitus with hyperglycemia  -Check A1c.    -Blood glucose 472 on presentation.  -Home regimen includes insulin Levemir and insulin lispro.  -Continue long-acting insulin.  Start sliding scale insulin.      History of CVA (cerebrovascular accident)  -History of CVA with right-sided deficits.  Slurred speech.  -Monitor.  Continue atorvastatin, aspirin.    COPD (chronic obstructive pulmonary disease)  -Longstanding history of COPD.  Continues to smoke.  Decreased air entry bilaterally.  Currently on room air.    -Continue home inhalers.    -P.rchastity Cam.    -Continuous pulse oximetry.    -Holding steroids due to labile blood sugars.    VTE Risk Mitigation (From admission, onward)           Ordered     heparin (porcine) injection 5,000 Units  Every 12 hours         09/29/24 1917     IP VTE HIGH RISK PATIENT  Once         09/29/24 1917     Place sequential compression device  Until discontinued         09/29/24 1917                    Austin Costa MD  Department of Hospital Medicine  [facility] [place] - Emergency Dept

## 2024-09-30 NOTE — PROGRESS NOTES
[facility] Medicine  Progress Note    Patient Name: Cali Mabry  MRN: [id number]  Patient Class: IP- Inpatient   Admission Date: 9/29/2024  Length of Stay: 1 days  Attending Physician: Kalyani Alberto, *  Primary Care Provider: Lawanda Flores NP        Subjective:     Principal Problem:Community acquired pneumonia of right lower lobe of lung        HPI:  59-year-old male with PMH of CVA with right-sided residual deficits and slurred speech, COPD, type 1 diabetes mellitus, chronic hep C infection, hypotension, history of polysubstance abuse including heroin and meth, tobacco use disorder presented to ER with complaints of shortness of breath.      Patient is a very poor historian and is not able to provide appropriate history.  He initially stated that he is here for his high blood sugar and later stated that he is here because he was not feeling well.  He reported 3 day history of shortness of breath and cough and also reported associated fevers and chills.  He complains of mild chest pain with coughing episodes.  He denies any nausea, vomiting, dizziness, syncopal episodes, palpitations, abdominal pain, diarrhea, constipation.  He reports compliance with his home medications.  Of note he was admitted here in July 2024 with similar presentation and was treated for pneumonia.  He has not smoked in last 1 week but continues to smoke 1 pack per day otherwise.    On presentation to ER he was initially tachycardic with later his heart rate became controlled, lab work was significant for WBC 12.8, sodium 128, bicarb 19, blood glucose 472, troponin negative, COVID-19 negative, chest x-ray showed patchy opacification of right lower lobe.  He was given IV ceftriaxone and IV azithromycin in the ER.  He will be admitted to medicine service for further management.    PMH: CVA with right-sided deficits, COPD, type 1 diabetes mellitus, chronic hep C infection, hypotension, history of  polysubstance abuse   PSH: None   SH:  Smokes 1 pack per day, history of heroin and meth abuse in the past.  Denies alcohol use.    FH: Reviewed and not pertinent.    Overview/Hospital Course:  59-year-old male with PMH of CVA with right-sided residual deficits and slurred speech, COPD, type 1 diabetes mellitus, chronic hep C infection, hypotension, history of polysubstance abuse including heroin and meth, tobacco use disorder presented to ER with complaints of shortness of breath.    On presentation to ER he was initially tachycardic with later his heart rate became controlled, lab work was significant for WBC 12.8, sodium 128, bicarb 19, blood glucose 472, troponin negative, COVID-19 negative, chest x-ray showed patchy opacification of right lower lobe.  He was given IV ceftriaxone and IV azithromycin in the ER for pneumonia,leucocytosis is improving.  Hyperglycemia and hyponatremia is improving.    Interval History: ,leucocytosis is improving.  Hyperglycemia and hyponatremia is improving.  Consulted PT,OT,ST.    Review of Systems   Constitutional:  Positive for activity change and appetite change.   Respiratory:  Positive for shortness of breath. Negative for chest tightness.    Gastrointestinal:  Negative for abdominal distention.   Genitourinary:  Negative for difficulty urinating.   Neurological:  Positive for weakness.     Objective:     Vital Signs (Most Recent):  Temp: 98.4 °F (36.9 °C) (09/30/24 0755)  Pulse: 109 (09/30/24 0759)  Resp: 19 (09/30/24 0759)  BP: 134/83 (09/30/24 0755)  SpO2: (!) 91 % (09/30/24 0759) Vital Signs (24h Range):  Temp:  [98 °F (36.7 °C)-100.3 °F (37.9 °C)] 98.4 °F (36.9 °C)  Pulse:  [] 109  Resp:  [17-34] 19  SpO2:  [91 %-94 %] 91 %  BP: (120-166)/(64-83) 134/83     Weight: 61.2 kg (134 lb 14.7 oz)  Body mass index is 18.82 kg/m².    Intake/Output Summary (Last 24 hours) at 9/30/2024 1031  Last data filed at 9/30/2024 0810  Gross per 24 hour   Intake 1840 ml   Output 550 ml    Net 1290 ml         Physical Exam  Pulmonary:      Effort: No respiratory distress.   Abdominal:      General: There is no distension.   Musculoskeletal:         General: No swelling.   Skin:     General: Skin is warm.   Neurological:      Mental Status: He is alert.             Significant Labs: All pertinent labs within the past 24 hours have been reviewed.  BMP:   Recent Labs   Lab 09/30/24  0443   *      K 3.7      CO2 22*   BUN 14   CREATININE 0.8   CALCIUM 8.6*   MG 1.9     CBC:   Recent Labs   Lab 09/29/24  1617 09/30/24  0443   WBC 12.85* 11.34   HGB 12.3* 10.6*   HCT 39.6* 34.6*    344     CMP:   Recent Labs   Lab 09/29/24  1617 09/30/24  0443   * 136   K 4.4 3.7   CL 94* 104   CO2 19* 22*   * 208*   BUN 25* 14   CREATININE 1.4 0.8   CALCIUM 9.0 8.6*   PROT 7.6 6.6   ALBUMIN 3.0* 2.6*   BILITOT 0.6 0.2   ALKPHOS 100 87   AST 19 15   ALT 17 14   ANIONGAP 15 10       Significant Imaging: I have reviewed all pertinent imaging results/findings within the past 24 hours.    Assessment/Plan:      * Community acquired pneumonia of right lower lobe of lung  -Presents with 2 days of worsening cough, fevers, chills, shortness of breath.    -WBC 12.8, chest x-ray with right lower lobe opacity.  COVID-19 negative.    -Given a dose of IV ceftriaxone and IV azithromycin and gout.    -Continue cap coverage with azithromycin and ceftriaxone.  -Follow blood cultures, sputum cultures.    -Continuous pulse oximetry.  Maintain O2 saturations 88-92%.      ,leucocytosis is improving.  Consulted ST,changed diet to soft diet.    History of CVA (cerebrovascular accident)  -History of CVA with right-sided deficits.  Slurred speech.  -Monitor.  Continue atorvastatin, aspirin.  PT,OT       Hepatitis C infection  Per record,need GI follow up       Type 1 diabetes mellitus with hyperglycemia  -Check A1c.    -Blood glucose 472 on presentation.  -Home regimen includes insulin Levemir and insulin  lispro.  -Continue long-acting insulin.  Start sliding scale insulin.          COPD (chronic obstructive pulmonary disease)  -Longstanding history of COPD.  Continues to smoke.  Decreased air entry bilaterally.  Currently on room air.    -Continue home inhalers.    -P.gagan Cam.    -Continuous pulse oximetry.    -Holding steroids due to labile blood sugars.      Moderate protein malnutrition  Nutrition consulted. Most recent weight and BMI monitored-     Measurements:  Wt Readings from Last 1 Encounters:   09/29/24 61.2 kg (134 lb 14.7 oz)   Body mass index is 18.82 kg/m².    Patient has been screened and assessed by RD.    Malnutrition Type:  Context:    Level:      Malnutrition Characteristic Summary:       Interventions/Recommendations (treatment strategy):         Hyponatremia  -Sodium 128.  Corrected sodium 134 in the setting of blood glucose of 472.    -Gentle IV hydration.  Follow BMP.      Improving.    Tobacco use disorder, moderate, dependence  Consulted over 6 minutes to quit smoking.      VTE Risk Mitigation (From admission, onward)           Ordered     heparin (porcine) injection 5,000 Units  Every 12 hours         09/29/24 1917     IP VTE HIGH RISK PATIENT  Once         09/29/24 1917     Place sequential compression device  Until discontinued         09/29/24 1917                    Discharge Planning   ROLA:      Code Status: Full Code   Is the patient medically ready for discharge?:     Reason for patient still in hospital (select all that apply): Patient trending condition                     Kalyani Alberto MD  Department of Hospital Medicine   [facility] [place] - Med Surg

## 2024-09-30 NOTE — PLAN OF CARE
Problem: Adult Inpatient Plan of Care  Goal: Plan of Care Review  Outcome: Progressing     Problem: Adult Inpatient Plan of Care  Goal: Patient-Specific Goal (Individualized)  Outcome: Progressing     Problem: Adult Inpatient Plan of Care  Goal: Absence of Hospital-Acquired Illness or Injury  Outcome: Progressing     Problem: Adult Inpatient Plan of Care  Goal: Optimal Comfort and Wellbeing  Outcome: Progressing     Problem: Diabetes Comorbidity  Goal: Blood Glucose Level Within Targeted Range  Outcome: Progressing     Problem: Pneumonia  Goal: Fluid Balance  Outcome: Progressing     Problem: Skin Injury Risk Increased  Goal: Skin Health and Integrity  Outcome: Progressing

## 2024-09-30 NOTE — NURSING
[facility] [place]  Nurses Note -- 4 Eyes      9/30/2024       Skin assessed on: Q Shift      [x] No Pressure Injuries Present    [x]Prevention Measures Documented    [] Yes LDA  for Pressure Injury Previously documented     [] Yes New Pressure Injury Discovered   [] LDA for New Pressure Injury Added      Attending RN:  Lillian Victor RN     Second RN:  Gifty Jaeger RN

## 2024-09-30 NOTE — NURSING
Pt arrived to the floor via bed to room 431.  IV's flushed.  VSS and BG monitored.  Tele box monitored.  Brief in place, urinal in reach for use.  Diabetic diet in place.  No acute distress noted, pt free from falls or injury.  Pt oriented to room and staff.  Bed in low position, wheels locked, call light in reach for assistance, will continue to monitor.    [facility]  Nurses Note -- 4 Eyes      9/29/2024       Skin assessed on: Admit      [x] No Pressure Injuries Present    [x]Prevention Measures Documented    [] Yes LDA  for Pressure Injury Previously documented     [] Yes New Pressure Injury Discovered   [] LDA for New Pressure Injury Added      Attending RN:  Gifty Jaeger, RITA     Second RN:  MARIA D Daniel

## 2024-09-30 NOTE — ASSESSMENT & PLAN NOTE
-Sodium 128.  Corrected sodium 134 in the setting of blood glucose of 472.    -Gentle IV hydration.  Follow BMP.

## 2024-09-30 NOTE — ASSESSMENT & PLAN NOTE
-Presents with 2 days of worsening cough, fevers, chills, shortness of breath.    -WBC 12.8, chest x-ray with right lower lobe opacity.  COVID-19 negative.    -Given a dose of IV ceftriaxone and IV azithromycin and gout.    -Continue cap coverage with azithromycin and ceftriaxone.  -Follow blood cultures, sputum cultures.    -Continuous pulse oximetry.  Maintain O2 saturations 88-92%.      ,leucocytosis is improving.  Consulted ST,changed diet to soft diet.

## 2024-09-30 NOTE — HOSPITAL COURSE
Mr Cali Mabry was admitted with R sided community acquired pneumonia. Started CTX/azithromycin. Unable to produce sputum. Clinically improved. Stable on room air. Seen by Speech therapy- continue puree diet. Stable for discharge to home. Transition to levofloxacin to complete 5 day total course. Plan of care discussed with patient and with his mother via family . Follow up with PCP.

## 2024-09-30 NOTE — ASSESSMENT & PLAN NOTE
-History of CVA with right-sided deficits.  Slurred speech.  -Monitor.  Continue atorvastatin, aspirin.  PT,OT

## 2024-09-30 NOTE — ASSESSMENT & PLAN NOTE
-History of CVA with right-sided deficits.  Slurred speech.  -Monitor.  Continue atorvastatin, aspirin.

## 2024-09-30 NOTE — PT/OT/SLP EVAL
"Speech Language Pathology Evaluation  Bedside Swallow    Patient Name:  Cali Mabry   MRN:  [id number]  Admitting Diagnosis: Community acquired pneumonia of right lower lobe of lung    Recommendations:                 General Recommendations:  Dysphagia therapy  Diet recommendations:  Puree, Thin   Aspiration Precautions: 1 bite/sip at a time   General Precautions: Standard, aspiration  Communication strategies:   calm tone    Assessment:     Cali Mabry is a 59 y.o. male with dx of RLL PNA he presents with premorbid oral dysphagia secondary to dyskinesia negatively impacted by lack of dentition.     History:     Past Medical History:   Diagnosis Date    Abnormal EEG 03/16/2024    3/16/2024 "Impression:   This is an abnormal awake and sleep vEEG consistent with a moderate diffuse encephalopathy and multifocal regions of cortical irritability that is most prominent in the right > left hemisphere.  No clinical events or electrographic seizures are recorded.  "    Acute on chronic pancreatitis 04/03/2016    Bacterial pneumonia 02/29/2020    Cavitary lung lesions  Parapneumonic effusion/ EMPYEMA    CHF (congestive heart failure)     Closed compression fracture of body of L1 vertebra 05/09/2020    COPD (chronic obstructive pulmonary disease) 08/08/2020    DM (diabetes mellitus), type 1     c/b DKA    HCV (hepatitis C virus)     08/2023 count < 12    HTN (hypertension)     Hx of psychiatric care     Stroke     R sided deficits    Substance abuse     IVDU heroin, methamphetamines       Past Surgical History:   Procedure Laterality Date    CARDIAC SURGERY  1999    stent placed. ([facility])    ESOPHAGOGASTRODUODENOSCOPY N/A 3/5/2020    Gastritis.  No H pylori.  Completed PPI for 1 month.  Procedure: EGD (ESOPHAGOGASTRODUODENOSCOPY);  Surgeon: Brinda Rene MD;  Location: [facility];  Service: Endoscopy;  Laterality: N/A;  W421 A; [id number]    LEFT HEART CATHETERIZATION Left 9/28/2020    Procedure: Left " heart cath;  Surgeon: Fito Rangel MD;  Location: [facility] CATH LAB;  Service: Cardiology;  Laterality: Left;    SHOULDER SURGERY  2013    right shoulder, spur removal.       Social History: Patient lives with mother who is her caregiver    Chest X-Rays: 9/29 Patchy densities in the right lower lung field. Findings may be related to a pneumonic process versus atelectatic change.     Prior diet: per mother, puree with thin liquids in home environment    Subjective   Pt compliant with ST tasks. He reports he prefers pureed foods due to lack of dentition and says he doesn't have problems taking pills.   Patient goals: d/c admit    Pain/Comfort:  Pain Rating 1: 0/10    Respiratory Status: Room air    Objective:     Oral Musculature Evaluation  Oral Musculature: general weakness  Dentition: edentulous  Secretion Management: adequate  Mucosal Quality: good  Mandibular Strength and Mobility: WFL  Lingual Strength and Mobility: other (see comments) (dyskinesia)  Voice Prior to PO Intake: wfl  Oral Musculature Comments: dyskinesia at rest    Bedside Swallow Eval:   Consistencies Assessed:  Thin liquids ~4oz via straw  Puree x5      Oral Phase:   Impulsive intake amount  Tongue thrust  Suspected swallow delay     Pharyngeal Phase:   no overt clinical signs/symptoms of aspiration    Compensatory Strategies  None    Treatment: please note silent aspiration cannot be r/o at bedside    Goals:   Multidisciplinary Problems       SLP Goals          Problem: SLP    Goal Priority Disciplines Outcome   SLP Goal    Low SLP Progressing   Description: Pt will tolerate baseline diet of puree with thin liquids x2 consecutive sessions (1 of 2 met 9/30)                       Plan:     Patient to be seen:  2 x/week   Plan of Care expires:  10/04/24  Plan of Care reviewed with:      SLP Follow-Up:  Yes       Discharge recommendations:  No Therapy Indicated   Barriers to Discharge:  None    Time Tracking:     SLP Treatment Date:    09/30/24  Speech Start Time:  1629  Speech Stop Time:  1652     Speech Total Time (min):  23 min    Billable Minutes: Eval Swallow and Oral Function 15 and Self Care/Home Management Training 8    09/30/2024

## 2024-09-30 NOTE — AI DETERIORATION ALERT
Artificial Intelligence Notification  [facility] [place]  [place] [address]  Phone: [phone]    This documentation was triggered by an Artificial Intelligence Notification:    Admit Date: 2024   LOS: 0  Code Status: Full Code  : 1965  Age: 59 y.o.  Weight:   Wt Readings from Last 1 Encounters:   24 57.6 kg (127 lb)        Sex: male  Bed: [place] [address] A  MRN: [id number]  Attending Physician: Kalyani Alberto, L[place]     Date of Alert: 2024  Time AI Alert Received: 9:32 PM              Vitals:    24   BP:    Pulse: 92   Resp:    Temp:      SpO2: (!) 93 %      Artificial Intelligence alert discussed with Provider:     Name: Alfred   Date/Time of Provider Notification: 9:32 PM        Patient Condition: Alert generated once patient arrived to the floor. See H&P.

## 2024-09-30 NOTE — ASSESSMENT & PLAN NOTE
- HgbA1c 11%   - BG varies widely 60-400s with hypoglycemia unawareness.  Uncontrolled BG d/t diet noncompliance. Has been bringing in outside food.    - decrease Levemir from 25 BID -> 22 BID.  Increase prandial insulin from 8->10 TID due to diet noncompliance  - SSI.  Monitor BG and continue to titrate insulin regimen  - he declined outpatient follow-up with endocrinology and wants to make his own appointment  - he refused speaking with diabetes education   67 yo M, domiciled at [facility], , no children, retired from job at [employer] in 40s, with PPH of schizoaffective disorder (bipolar type with psychotic features), one past suicide attempt in 1992 via cutting wrists (found by mother and brought in to hospital), multiple past psychiatric hospitalizations (last [facility] 2S June - Aug 2023), substance use hx (ETOH-last used April 2024, h/o 3 DUIs in 40s; cocaine- last used 20 yrs ago), and PMH of HTN who presents to [facility] ED BIB sister from [facility] due to 2 months of worsening depression, psychosis (AH, paranoia), and poor attention to ADLs.    Today, patient presents with hopelessness, AH with neutral commands, paranoia, delusions of being sent to penitentiary, social withdrawal, poor ADLs, and passive SI in the context of stopping outpatient ECT in May 2024 and recent medication changes. Presentation is consistent with exacerbation of schizoaffective disorder, current episode depressed with psychosis. Per collateral from sister, patient was previously functioning much better while receiving ECT treatments q3wks. Patient is at elevated acute risk of harm to self due to worsening symptoms, poor ADLs, and passive SI. He requires inpatient psych admission for medication titration, safety planning, and possible re-initiation of ECT.    9/16: Pt AnOx3 to person, place, and time. Reports increasing anhedonia and hopelessness. Pt describes AH which promote feelings of depression and paranoia that he is be watched. Reports feeling safe in the hospital and denies command hallucinations. Admits to passive SI but denies current intent or plan. Denies HI or visual hallucinations. Admits to continued craving for alcohol but states it is controlled because of lack of access.   9/17: Continues to endorse poor mood, sleep, and feelings of paranoia/being watched. Describes VH of shadows throughout the day. Pt amenable to [facility] admission. Denies current AH, command hallucinations, SI or HI.   9/18: Continues to report poor mood, appetite, and sleep. Describes worsening paranoia and racing thoughts, states that he is being watched by everyone because he will be sent to penitentiary for life. Denies that there might be another explanation for these events or that these beliefs are abnormal. Verbalizes understanding of plan to go to [facility]. Denies AVH, SI/I/P, HI  9/19: Reports worsening mood and continued poor sleep and appetite. Continues to endorse feelings of paranoia. Denies AVH, command hallucinations, SI/I/P, HI. No adverse effects noted from Zyprexa increase. Awaiting bed availability at [facility]. Pt malodorous and requesting assistance with showering.    9/20: Continues to report poor mood and appetite, denies any discomfort with eating. Pt encouraged to slowly increase his portions. Denies SI/I/P, AVH, HI.    9/23: Reports poor mood and appetite. Admits to vague whispered AH over the weekend, denies command hallucinations. Pt encouraged to increase PO intake. Denies SI/I/P.    9/24: As per discussion with the primary team, patient noted to have improved PO intake and new onset hematuria, continues to report ideas of reference and depressed mood.  Denies current AVH, SI/I/P, HI.  9/25: Reports low mood and anhedonia; continues to describe ruminations and feelings of paranoia. Firmly denies si and hi.  9/27: Reports somewhat improved PO intake but still not eating well, reports poor mood. Amenable to increasing Mirtazapine dose as stated below. Does not endorse ideas of reference, states he feels safe. Denies SI/HI.   9/30: Continues to endorse poor mood and appetite. Alludes to ruminations, denies adverse effects from increasing Mirtazepine dose. Denies AVH/SI/HI.       PLAN  - Please have nursing assist pt with showering and ADLs, pt has difficulty initiating action   - Encourage PO intake, document calorie count.  -  c/w mirtazapine 15 mg PO qHS for appetite stimulation/mood   - c/w Effexor 225 mg daily to target mood sxs  - c/w Zyprexa 25 mg HS for psychosis  - Agree to continue ativan 0.5mg PO qHS, plan to eventually dc   - PRN: Haldol 5 mg PO/IM q6h for agitation   - Hold antipsychotics if qtc >500  - OOB to chair/Pt/OT as needed as tolerated  -Dispo; Given ongoing depression/psychosis, pt. will need an inpatient psychiatric admission for further stabilization/medication management, admit voluntary to [facility] once cleared medically.

## 2024-10-01 VITALS
HEIGHT: 71 IN | WEIGHT: 134.94 LBS | BODY MASS INDEX: 18.89 KG/M2 | DIASTOLIC BLOOD PRESSURE: 88 MMHG | SYSTOLIC BLOOD PRESSURE: 134 MMHG | RESPIRATION RATE: 17 BRPM | HEART RATE: 80 BPM | TEMPERATURE: 98 F | OXYGEN SATURATION: 93 %

## 2024-10-01 LAB
ALBUMIN SERPL BCP-MCNC: 2.5 G/DL (ref 3.5–5.2)
ALP SERPL-CCNC: 94 U/L (ref 55–135)
ALT SERPL W/O P-5'-P-CCNC: 15 U/L (ref 10–44)
ANION GAP SERPL CALC-SCNC: 9 MMOL/L (ref 8–16)
AST SERPL-CCNC: 15 U/L (ref 10–40)
BASOPHILS # BLD AUTO: 0.02 K/UL (ref 0–0.2)
BASOPHILS NFR BLD: 0.3 % (ref 0–1.9)
BILIRUB SERPL-MCNC: 0.2 MG/DL (ref 0.1–1)
BUN SERPL-MCNC: 13 MG/DL (ref 6–20)
CALCIUM SERPL-MCNC: 8.8 MG/DL (ref 8.7–10.5)
CHLORIDE SERPL-SCNC: 104 MMOL/L (ref 95–110)
CO2 SERPL-SCNC: 23 MMOL/L (ref 23–29)
CREAT SERPL-MCNC: 0.7 MG/DL (ref 0.5–1.4)
DIFFERENTIAL METHOD BLD: ABNORMAL
EOSINOPHIL # BLD AUTO: 0.3 K/UL (ref 0–0.5)
EOSINOPHIL NFR BLD: 4.3 % (ref 0–8)
ERYTHROCYTE [DISTWIDTH] IN BLOOD BY AUTOMATED COUNT: 15.2 % (ref 11.5–14.5)
EST. GFR  (NO RACE VARIABLE): >60 ML/MIN/1.73 M^2
GLUCOSE SERPL-MCNC: 150 MG/DL (ref 70–110)
HCT VFR BLD AUTO: 37.2 % (ref 40–54)
HGB BLD-MCNC: 11.2 G/DL (ref 14–18)
IMM GRANULOCYTES # BLD AUTO: 0.03 K/UL (ref 0–0.04)
IMM GRANULOCYTES NFR BLD AUTO: 0.4 % (ref 0–0.5)
LYMPHOCYTES # BLD AUTO: 1.1 K/UL (ref 1–4.8)
LYMPHOCYTES NFR BLD: 15.9 % (ref 18–48)
MAGNESIUM SERPL-MCNC: 1.9 MG/DL (ref 1.6–2.6)
MCH RBC QN AUTO: 23.7 PG (ref 27–31)
MCHC RBC AUTO-ENTMCNC: 30.1 G/DL (ref 32–36)
MCV RBC AUTO: 79 FL (ref 82–98)
MONOCYTES # BLD AUTO: 0.7 K/UL (ref 0.3–1)
MONOCYTES NFR BLD: 9.6 % (ref 4–15)
NEUTROPHILS # BLD AUTO: 4.9 K/UL (ref 1.8–7.7)
NEUTROPHILS NFR BLD: 69.5 % (ref 38–73)
NRBC BLD-RTO: 0 /100 WBC
PLATELET # BLD AUTO: 355 K/UL (ref 150–450)
PMV BLD AUTO: 11 FL (ref 9.2–12.9)
POCT GLUCOSE: 153 MG/DL (ref 70–110)
POCT GLUCOSE: 159 MG/DL (ref 70–110)
POCT GLUCOSE: 210 MG/DL (ref 70–110)
POTASSIUM SERPL-SCNC: 3.3 MMOL/L (ref 3.5–5.1)
PROT SERPL-MCNC: 6.8 G/DL (ref 6–8.4)
RBC # BLD AUTO: 4.72 M/UL (ref 4.6–6.2)
SODIUM SERPL-SCNC: 136 MMOL/L (ref 136–145)
WBC # BLD AUTO: 6.99 K/UL (ref 3.9–12.7)

## 2024-10-01 PROCEDURE — 85025 COMPLETE CBC W/AUTO DIFF WBC: CPT | Performed by: STUDENT IN AN ORGANIZED HEALTH CARE EDUCATION/TRAINING PROGRAM

## 2024-10-01 PROCEDURE — 25000003 PHARM REV CODE 250: Performed by: STUDENT IN AN ORGANIZED HEALTH CARE EDUCATION/TRAINING PROGRAM

## 2024-10-01 PROCEDURE — 63600175 PHARM REV CODE 636 W HCPCS: Performed by: HOSPITALIST

## 2024-10-01 PROCEDURE — 63600175 PHARM REV CODE 636 W HCPCS: Performed by: STUDENT IN AN ORGANIZED HEALTH CARE EDUCATION/TRAINING PROGRAM

## 2024-10-01 PROCEDURE — 36415 COLL VENOUS BLD VENIPUNCTURE: CPT | Performed by: STUDENT IN AN ORGANIZED HEALTH CARE EDUCATION/TRAINING PROGRAM

## 2024-10-01 PROCEDURE — 80053 COMPREHEN METABOLIC PANEL: CPT | Performed by: STUDENT IN AN ORGANIZED HEALTH CARE EDUCATION/TRAINING PROGRAM

## 2024-10-01 PROCEDURE — 25000003 PHARM REV CODE 250: Performed by: HOSPITALIST

## 2024-10-01 PROCEDURE — 63700000 PHARM REV CODE 250 ALT 637 W/O HCPCS: Performed by: HOSPITALIST

## 2024-10-01 PROCEDURE — 83735 ASSAY OF MAGNESIUM: CPT | Performed by: STUDENT IN AN ORGANIZED HEALTH CARE EDUCATION/TRAINING PROGRAM

## 2024-10-01 RX ORDER — AZITHROMYCIN 250 MG/1
500 TABLET, FILM COATED ORAL ONCE
Status: COMPLETED | OUTPATIENT
Start: 2024-10-01 | End: 2024-10-01

## 2024-10-01 RX ORDER — LEVOFLOXACIN 750 MG/1
750 TABLET ORAL DAILY
Qty: 2 TABLET | Refills: 0 | Status: SHIPPED | OUTPATIENT
Start: 2024-10-02 | End: 2024-10-04

## 2024-10-01 RX ORDER — POTASSIUM CHLORIDE 20 MEQ/1
40 TABLET, EXTENDED RELEASE ORAL ONCE
Status: COMPLETED | OUTPATIENT
Start: 2024-10-01 | End: 2024-10-01

## 2024-10-01 RX ADMIN — CEFTRIAXONE 2 G: 2 INJECTION, POWDER, FOR SOLUTION INTRAMUSCULAR; INTRAVENOUS at 11:10

## 2024-10-01 RX ADMIN — AZITHROMYCIN DIHYDRATE 500 MG: 250 TABLET ORAL at 11:10

## 2024-10-01 RX ADMIN — VENLAFAXINE HYDROCHLORIDE 75 MG: 75 CAPSULE, EXTENDED RELEASE ORAL at 08:10

## 2024-10-01 RX ADMIN — INSULIN ASPART 2 UNITS: 100 INJECTION, SOLUTION INTRAVENOUS; SUBCUTANEOUS at 11:10

## 2024-10-01 RX ADMIN — ASPIRIN 81 MG CHEWABLE TABLET 81 MG: 81 TABLET CHEWABLE at 09:10

## 2024-10-01 RX ADMIN — HEPARIN SODIUM 5000 UNITS: 5000 INJECTION INTRAVENOUS; SUBCUTANEOUS at 08:10

## 2024-10-01 RX ADMIN — MUPIROCIN: 20 OINTMENT TOPICAL at 08:10

## 2024-10-01 RX ADMIN — INSULIN GLARGINE 15 UNITS: 100 INJECTION, SOLUTION SUBCUTANEOUS at 11:10

## 2024-10-01 RX ADMIN — MUPIROCIN: 20 OINTMENT TOPICAL at 09:10

## 2024-10-01 RX ADMIN — POTASSIUM CHLORIDE 40 MEQ: 1500 TABLET, EXTENDED RELEASE ORAL at 09:10

## 2024-10-01 RX ADMIN — INSULIN ASPART 2 UNITS: 100 INJECTION, SOLUTION INTRAVENOUS; SUBCUTANEOUS at 05:10

## 2024-10-01 RX ADMIN — ATORVASTATIN CALCIUM 20 MG: 10 TABLET, FILM COATED ORAL at 08:10

## 2024-10-01 RX ADMIN — HEPARIN SODIUM 5000 UNITS: 5000 INJECTION INTRAVENOUS; SUBCUTANEOUS at 09:10

## 2024-10-01 NOTE — PLAN OF CARE
Problem: Adult Inpatient Plan of Care  Goal: Plan of Care Review  Outcome: Progressing  Flowsheets (Taken 10/1/2024 0815)  Plan of Care Reviewed With: patient  Goal: Patient-Specific Goal (Individualized)  Outcome: Progressing     Problem: Diabetes Comorbidity  Goal: Blood Glucose Level Within Targeted Range  Outcome: Progressing  Intervention: Monitor and Manage Glycemia  Flowsheets (Taken 10/1/2024 0815)  Glycemic Management:   blood glucose monitored   carbohydrate replacement provided   oral hydration promoted   supplemental insulin given     Problem: Adult Inpatient Plan of Care  Goal: Plan of Care Review  Outcome: Progressing  Flowsheets (Taken 10/1/2024 0815)  Plan of Care Reviewed With: patient     Problem: Adult Inpatient Plan of Care  Goal: Plan of Care Review  Outcome: Progressing  Flowsheets (Taken 10/1/2024 0815)  Plan of Care Reviewed With: patient     Problem: Adult Inpatient Plan of Care  Goal: Patient-Specific Goal (Individualized)  Outcome: Progressing     Problem: Adult Inpatient Plan of Care  Goal: Patient-Specific Goal (Individualized)  Outcome: Progressing     Problem: Diabetes Comorbidity  Goal: Blood Glucose Level Within Targeted Range  Outcome: Progressing  Intervention: Monitor and Manage Glycemia  Flowsheets (Taken 10/1/2024 0815)  Glycemic Management:   blood glucose monitored   carbohydrate replacement provided   oral hydration promoted   supplemental insulin given     Problem: Diabetes Comorbidity  Goal: Blood Glucose Level Within Targeted Range  Outcome: Progressing     Problem: Diabetes Comorbidity  Goal: Blood Glucose Level Within Targeted Range  Intervention: Monitor and Manage Glycemia  Flowsheets (Taken 10/1/2024 0815)  Glycemic Management:   blood glucose monitored   carbohydrate replacement provided   oral hydration promoted   supplemental insulin given     Problem: Diabetes Comorbidity  Goal: Blood Glucose Level Within Targeted Range  Intervention: Monitor and Manage  Glycemia  Flowsheets (Taken 10/1/2024 0815)  Glycemic Management:   blood glucose monitored   carbohydrate replacement provided   oral hydration promoted   supplemental insulin given

## 2024-10-01 NOTE — NURSING
Pt resting in bed, turned for comfort.  IV flushed and saline locked.  Diet tolerated well, pt denies N/V,  Tele box monitored.  BG monitored, scheduled and sliding scale insulin given as ordered. No acute distress noted, pt free from falls or injury this shift. Bed in low position, wheels locked, bed alarm on, call light in reach for assistance, will continue to monitor.    [facility]  Nurses Note -- 4 Eyes      10/1/2024       Skin assessed on: Q Shift      [x] No Pressure Injuries Present    [x]Prevention Measures Documented    [] Yes LDA  for Pressure Injury Previously documented     [] Yes New Pressure Injury Discovered   [] LDA for New Pressure Injury Added      Attending RN:  Gifty Jaeger RN     Second RN:  RITA Snyder

## 2024-10-01 NOTE — NURSING
[facility]  Nurses Note -- 4 Eyes      10/1/2024       Skin assessed on: Q Shift      [x] No Pressure Injuries Present    []Prevention Measures Documented    [] Yes LDA  for Pressure Injury Previously documented     [] Yes New Pressure Injury Discovered   [] LDA for New Pressure Injury Added      Attending RN:  Kalyn Puente RN     Second RN:  Gifty

## 2024-10-01 NOTE — PLAN OF CARE
I provided the patient a choice of post acute providers and offered a list of CMS rated Home Health      Patient/family chose the following as their preferred providers:    [facility]    [facility]. [facility].

## 2024-10-01 NOTE — PLAN OF CARE
Problem: Adult Inpatient Plan of Care  Goal: Plan of Care Review  Outcome: Progressing     Problem: Adult Inpatient Plan of Care  Goal: Patient-Specific Goal (Individualized)  Outcome: Progressing     Problem: Adult Inpatient Plan of Care  Goal: Absence of Hospital-Acquired Illness or Injury  Outcome: Progressing     Problem: Adult Inpatient Plan of Care  Goal: Optimal Comfort and Wellbeing  Outcome: Progressing     Problem: Diabetes Comorbidity  Goal: Blood Glucose Level Within Targeted Range  Outcome: Progressing     Problem: Pneumonia  Goal: Fluid Balance  Outcome: Progressing     Problem: Pneumonia  Goal: Resolution of Infection Signs and Symptoms  Outcome: Progressing     Problem: Skin Injury Risk Increased  Goal: Skin Health and Integrity  Outcome: Progressing

## 2024-10-01 NOTE — PLAN OF CARE
[facility] [place] [facility]      HOME HEALTH ORDERS  FACE TO FACE ENCOUNTER    Patient Name: Cali Mabry  YOB: 1965    PCP: Lawanda Flores NP   PCP Address: [address] / [place]  PCP Phone Number: [phone]  PCP Fax: [phone]    Encounter Date: 9/29/24    Admit to Home Health    Diagnoses:  Active Hospital Problems    Diagnosis  POA    *Community acquired pneumonia of right lower lobe of lung [J18.9]  Yes    History of CVA (cerebrovascular accident) [Z86.73]  Not Applicable    Hepatitis C infection [B19.20]  Yes    Type 1 diabetes mellitus with hyperglycemia [E10.65]  Yes    COPD (chronic obstructive pulmonary disease) [J44.9]  Yes    Moderate protein malnutrition [E44.0]  Yes    Hyponatremia [E87.1]  Yes    Tobacco use disorder, moderate, dependence [F17.200]  Yes     Chronic      Resolved Hospital Problems   No resolved problems to display.       Follow Up Appointments:  Future Appointments   Date Time Provider Department Center   10/24/2024  8:00 AM Megan Garner NP [address]       Allergies:Review of patient's allergies indicates:  No Known Allergies    Medications: Review discharge medications with patient and family and provide education.    Current Facility-Administered Medications   Medication Dose Route Frequency Provider Last Rate Last Admin    acetaminophen tablet 650 mg  650 mg Oral Q4H PRN Austin Costa MD   650 mg at 09/30/24 2136    acetaminophen tablet 650 mg  650 mg Oral Q8H PRN Austin Costa MD        albuterol sulfate nebulizer solution 2.5 mg  2.5 mg Nebulization Q6H PRN Kalyani Alberto MD        albuterol-ipratropium 2.5 mg-0.5 mg/3 mL nebulizer solution 3 mL  3 mL Nebulization Daily Kalyani Alberto MD   3 mL at 09/30/24 0759    aspirin chewable tablet 81 mg  81 mg Oral Daily Austin Costa MD   81 mg at 10/01/24 0951    atorvastatin tablet 20 mg  20 mg Oral QHS Austin Costa MD   20 mg  at 09/30/24 2129    cefTRIAXone (ROCEPHIN) 2 g in D5W 100 mL IVPB (MB+)  2 g Intravenous Q24H Karissa Lugo  mL/hr at 10/01/24 1142 2 g at 10/01/24 1142    dextrose 10% bolus 125 mL 125 mL  12.5 g Intravenous Austin Castro MD        dextrose 10% bolus 250 mL 250 mL  25 g Intravenous PRN Austin Costa MD        glucagon (human recombinant) injection 1 mg  1 mg Intramuscular PRN Austin Costa MD        glucose chewable tablet 16 g  16 g Oral Austin Castro MD        glucose chewable tablet 24 g  24 g Oral Austin Castro MD        heparin (porcine) injection 5,000 Units  5,000 Units Subcutaneous Q12H Austin Costa MD   5,000 Units at 10/01/24 0951    insulin aspart U-100 pen 0-10 Units  0-10 Units Subcutaneous QID (AC + HS) Austin Castro MD   2 Units at 10/01/24 1136    insulin glargine U-100 (Lantus) pen 15 Units  15 Units Subcutaneous BID Kalyani Alberto MD   15 Units at 10/01/24 1136    mupirocin 2 % ointment   Nasal BID Kalyani Alberto MD   Given at 10/01/24 0951    naloxone 0.4 mg/mL injection 0.02 mg  0.02 mg Intravenous PRAustin Milan MD        ondansetron injection 4 mg  4 mg Intravenous Q8H Austin Castro MD        senna-docusate 8.6-50 mg per tablet 1 tablet  1 tablet Oral Daily PRAustin Milan MD        sodium chloride 0.9% flush 10 mL  10 mL Intravenous Q12H Austin Castro MD        venlafaxine 24 hr capsule 75 mg  75 mg Oral QHS Austin Costa MD   75 mg at 09/30/24 2129        Medication List        START taking these medications      levoFLOXacin 750 MG tablet  Commonly known as: LEVAQUIN  Take 1 tablet (750 mg total) by mouth once daily. for 2 days  Start taking on: October 2, 2024            CONTINUE taking these medications      albuterol 90 mcg/actuation inhaler  Commonly known as: PROVENTIL HFA  Inhale 2 puffs into the lungs every 6 (six) hours as needed for  "Wheezing. Rescue     ANORO ELLIPTA 62.5-25 mcg/actuation Dsdv  Generic drug: umeclidinium-vilanteroL  Inhale 1 puff into the lungs once daily. Controller     aspirin 81 MG Chew  Take 1 tablet (81 mg total) by mouth once daily.     atorvastatin 20 MG tablet  Commonly known as: LIPITOR  Take 1 tablet (20 mg total) by mouth once daily.     BD GERTRUDIS 2ND GEN PEN NEEDLE 32 gauge x 5/32" Ndle  Generic drug: pen needle, diabetic  Inject into the skin 4 (four) times daily.     benzonatate 100 MG capsule  Commonly known as: TESSALON  Take 1 capsule (100 mg total) by mouth 3 (three) times daily as needed for Cough.     folic acid 1 MG tablet  Commonly known as: FOLVITE  Take 1 tablet (1 mg total) by mouth once daily.     FREESTYLE SEBASTIAN 2 SENSOR Kit  Generic drug: flash glucose sensor  USE FOUR TIMES PER WEEK AS DIRECTED     insulin detemir U-100 (Levemir) 100 unit/mL (3 mL) Inpn pen  Inject 10 Units into the skin 2 (two) times daily. Inject 10 units under the skin in the morning and 10 units in the evening.     insulin lispro 100 unit/mL pen  Inject 5 Units into the skin 3 (three) times daily before meals.     levocetirizine 5 MG tablet  Commonly known as: XYZAL  Take 5 mg by mouth every evening.     naloxone 4 mg/actuation Spry  Commonly known as: NARCAN  4mg by nasal route as needed for opioid overdose; may repeat every 2-3 minutes in alternating nostrils until medical help arrives. Call 911     venlafaxine 75 MG 24 hr capsule  Commonly known as: EFFEXOR-XR  Take 1 capsule (75 mg total) by mouth every evening.                I have seen and examined this patient within the last 30 days. My clinical findings that support the need for the home health skilled services and home bound status are the following:no   Weakness/numbness causing balance and gait disturbance due to Infection making it taxing to leave home.     Diet:   diabetic diet 2000 calorie and pureed diet  thin      Referrals/ Consults  Physical Therapy to evaluate " and treat. Evaluate for home safety and equipment needs; Establish/upgrade home exercise program. Perform / instruct on therapeutic exercises, gait training, transfer training, and Range of Motion.  Occupational Therapy to evaluate and treat. Evaluate home environment for safety and equipment needs. Perform/Instruct on transfers, ADL training, ROM, and therapeutic exercises.    Activities:   activity as tolerated    Nursing:   Agency to admit patient within 24 hours of hospital discharge unless specified on physician order or at patient request    SN to complete comprehensive assessment including routine vital signs. Instruct on disease process and s/s of complications to report to MD. Review/verify medication list sent home with the patient at time of discharge  and instruct patient/caregiver as needed. Frequency may be adjusted depending on start of care date.     Skilled nurse to perform up to 3 visits PRN for symptoms related to diagnosis    Notify MD if SBP > 160 or < 90; DBP > 90 or < 50; HR > 120 or < 50; Temp > 101; O2 < 88%    Ok to schedule additional visits based on staff availability and patient request on consecutive days within the home health episode.    When multiple disciplines ordered:    Start of Care occurs on Sunday - Wednesday schedule remaining discipline evaluations as ordered on separate consecutive days following the start of care.    Thursday SOC -schedule subsequent evaluations Friday and Monday the following week.     Friday - Saturday SOC - schedule subsequent discipline evaluations on consecutive days starting Monday of the following week.    For all post-discharge communication and subsequent orders please contact patient's primary care physician.    I certify that this patient is confined to his home and needs intermittent skilled nursing care, physical therapy, and occupational therapy.      Karissa Lugo MD  10/01/2024 1:11 PM

## 2024-10-01 NOTE — PROGRESS NOTES
[facility]  Approved by Dixon/Christina Hernandez.

## 2024-10-01 NOTE — DISCHARGE SUMMARY
[facility] Medicine  Discharge Summary      Patient Name: Cali Mabry  MRN: [id number]  [facility]: [id number]  Patient Class: IP- Inpatient  Admission Date: 9/29/2024  Hospital Length of Stay: 2 days  Discharge Date and Time:  10/01/2024 1:13 PM  Attending Physician: Karissa Lugo MD   Discharging Provider: Karissa Lugo MD  Primary Care Provider: Lawanda Flores NP    Primary Care Team: Networked reference to record PCT     HPI:   59-year-old male with PMH of CVA with right-sided residual deficits and slurred speech, COPD, type 1 diabetes mellitus, chronic hep C infection, hypotension, history of polysubstance abuse including heroin and meth, tobacco use disorder presented to ER with complaints of shortness of breath.      Patient is a very poor historian and is not able to provide appropriate history.  He initially stated that he is here for his high blood sugar and later stated that he is here because he was not feeling well.  He reported 3 day history of shortness of breath and cough and also reported associated fevers and chills.  He complains of mild chest pain with coughing episodes.  He denies any nausea, vomiting, dizziness, syncopal episodes, palpitations, abdominal pain, diarrhea, constipation.  He reports compliance with his home medications.  Of note he was admitted here in July 2024 with similar presentation and was treated for pneumonia.  He has not smoked in last 1 week but continues to smoke 1 pack per day otherwise.    On presentation to ER he was initially tachycardic with later his heart rate became controlled, lab work was significant for WBC 12.8, sodium 128, bicarb 19, blood glucose 472, troponin negative, COVID-19 negative, chest x-ray showed patchy opacification of right lower lobe.  He was given IV ceftriaxone and IV azithromycin in the ER.  He will be admitted to medicine service for further management.    PMH: CVA with right-sided deficits, COPD, type 1  diabetes mellitus, chronic hep C infection, hypotension, history of polysubstance abuse   PSH: None   SH:  Smokes 1 pack per day, history of heroin and meth abuse in the past.  Denies alcohol use.    FH: Reviewed and not pertinent.    * No surgery found *      Hospital Course:   Mr Cali Mabry was admitted with R sided community acquired pneumonia. Started CTX/azithromycin. Unable to produce sputum. Clinically improved. Stable on room air. Seen by Speech therapy- continue puree diet. Stable for discharge to home. Transition to levofloxacin to complete 5 day total course. Plan of care discussed with patient and with his mother via family . Follow up with PCP.      Goals of Care Treatment Preferences:  Code Status: Full Code         Consults:     No new Assessment & Plan notes have been filed under this hospital service since the last note was generated.  Service: Hospital Medicine    Final Active Diagnoses:    Diagnosis Date Noted POA    PRINCIPAL PROBLEM:  Community acquired pneumonia of right lower lobe of lung [J18.9] 02/22/2020 Yes    History of CVA (cerebrovascular accident) [Z86.73] 09/29/2024 Not Applicable    Hepatitis C infection [B19.20] 07/28/2024 Yes    Type 1 diabetes mellitus with hyperglycemia [E10.65] 05/02/2021 Yes    COPD (chronic obstructive pulmonary disease) [J44.9] 08/08/2020 Yes    Moderate protein malnutrition [E44.0] 04/23/2020 Yes    Hyponatremia [E87.1] 04/13/2019 Yes    Tobacco use disorder, moderate, dependence [F17.200] 11/23/2017 Yes     Chronic      Problems Resolved During this Admission:       Discharged Condition: good    Disposition: Home-Health Care c    Follow Up: with PCP    Patient Instructions:      Ambulatory referral/consult to [facility]   Standing Status: Future   Referral Priority: Routine Referral Type: Consultation   Referral Reason: Specialty Services Required   Number of Visits Requested: 1     Diet diabetic   Order Comments:  Puree diet     Notify your health care provider if you experience any of the following:  temperature >100.4     Notify your health care provider if you experience any of the following:  persistent nausea and vomiting or diarrhea     Notify your health care provider if you experience any of the following:  severe uncontrolled pain     Notify your health care provider if you experience any of the following:  redness, tenderness, or signs of infection (pain, swelling, redness, odor or green/yellow discharge around incision site)     Notify your health care provider if you experience any of the following:  difficulty breathing or increased cough     Notify your health care provider if you experience any of the following:  severe persistent headache     Notify your health care provider if you experience any of the following:  worsening rash     Notify your health care provider if you experience any of the following:  persistent dizziness, light-headedness, or visual disturbances     Notify your health care provider if you experience any of the following:  increased confusion or weakness     Activity as tolerated       Significant Diagnostic Studies: N/A    Pending Diagnostic Studies:       None           Medications:  Reconciled Home Medications:      Medication List        START taking these medications      levoFLOXacin 750 MG tablet  Commonly known as: LEVAQUIN  Take 1 tablet (750 mg total) by mouth once daily. for 2 days  Start taking on: October 2, 2024            CONTINUE taking these medications      albuterol 90 mcg/actuation inhaler  Commonly known as: PROVENTIL HFA  Inhale 2 puffs into the lungs every 6 (six) hours as needed for Wheezing. Rescue     ANORO ELLIPTA 62.5-25 mcg/actuation Dsdv  Generic drug: umeclidinium-vilanteroL  Inhale 1 puff into the lungs once daily. Controller     aspirin 81 MG Chew  Take 1 tablet (81 mg total) by mouth once daily.     atorvastatin 20 MG tablet  Commonly known as:  "LIPITOR  Take 1 tablet (20 mg total) by mouth once daily.     BD GERTRUDIS 2ND GEN PEN NEEDLE 32 gauge x 5/32" Ndle  Generic drug: pen needle, diabetic  Inject into the skin 4 (four) times daily.     benzonatate 100 MG capsule  Commonly known as: TESSALON  Take 1 capsule (100 mg total) by mouth 3 (three) times daily as needed for Cough.     folic acid 1 MG tablet  Commonly known as: FOLVITE  Take 1 tablet (1 mg total) by mouth once daily.     FREESTYLE SEBASTIAN 2 SENSOR Kit  Generic drug: flash glucose sensor  USE FOUR TIMES PER WEEK AS DIRECTED     insulin detemir U-100 (Levemir) 100 unit/mL (3 mL) Inpn pen  Inject 10 Units into the skin 2 (two) times daily. Inject 10 units under the skin in the morning and 10 units in the evening.     insulin lispro 100 unit/mL pen  Inject 5 Units into the skin 3 (three) times daily before meals.     levocetirizine 5 MG tablet  Commonly known as: XYZAL  Take 5 mg by mouth every evening.     naloxone 4 mg/actuation Spry  Commonly known as: NARCAN  4mg by nasal route as needed for opioid overdose; may repeat every 2-3 minutes in alternating nostrils until medical help arrives. Call 911     venlafaxine 75 MG 24 hr capsule  Commonly known as: EFFEXOR-XR  Take 1 capsule (75 mg total) by mouth every evening.              Indwelling Lines/Drains at time of discharge:   Lines/Drains/Airways       None                   Time spent on the discharge of patient: 35 minutes         Karissa Lugo MD  Department of Hospital Medicine  [facility] [place] - Med Surg  "

## 2024-10-01 NOTE — PLAN OF CARE
Problem: Adult Inpatient Plan of Care  Goal: Plan of Care Review  Outcome: Adequate for Care Transition  Goal: Patient-Specific Goal (Individualized)  Outcome: Adequate for Care Transition  Goal: Absence of Hospital-Acquired Illness or Injury  Outcome: Adequate for Care Transition  Goal: Optimal Comfort and Wellbeing  Outcome: Adequate for Care Transition  Goal: Readiness for Transition of Care  Outcome: Adequate for Care Transition     Problem: Diabetes Comorbidity  Goal: Blood Glucose Level Within Targeted Range  Outcome: Adequate for Care Transition     Problem: Pneumonia  Goal: Fluid Balance  Outcome: Adequate for Care Transition  Goal: Resolution of Infection Signs and Symptoms  Outcome: Adequate for Care Transition  Goal: Effective Oxygenation and Ventilation  Outcome: Adequate for Care Transition     Problem: Skin Injury Risk Increased  Goal: Skin Health and Integrity  Outcome: Adequate for Care Transition

## 2024-10-01 NOTE — NURSING
AVS virtually reviewed with patient's mother in its entirety with emphasis on diet, medications, follow-up appointments and reasons to return to the ED or contact the [facility]. Patient also encouraged to utilize their patient portal. Ease and convenience of use reiterated. Education complete and patient voiced understanding. All questions answered. Discharge teaching completed.

## 2024-10-01 NOTE — PLAN OF CARE
Case Management Final Discharge Note    Discharge Disposition: [facility]/ Home     New DME ordered / company name: None     Relevant SDOH / Transition of Care Barriers:  None     Primary Caretaker and contact information: Marilou Knox [phone]    Scheduled followup appointment: PCP- Pt will call to schedule a 1 week hospital follow-up. CM unable to schedule per office staff.    Transportation: Pt's family will provide transportation home upon discharge.     Patient and family educated on discharge services and updated on DC plan. Bedside RN notified, patient clear to discharge from Case Management Perspective.       10/01/24 1409   Final Note   Assessment Type Final Discharge Note   Anticipated Discharge Disposition Home-Health   What phone number can be called within the next 1-3 days to see how you are doing after discharge? [phone]   Hospital Resources/Appts/Education Provided Appointments scheduled and added to AVS   Post-Acute Status   Post-Acute Authorization Home Health   Home Health Status Set-up Complete/Auth obtained   Coverage [employer] Managed Medicare   Discharge Delays None known at this time

## 2024-10-02 LAB — BACTERIA UR CULT: NORMAL

## 2024-10-03 LAB
BACTERIA BLD CULT: NORMAL
BACTERIA BLD CULT: NORMAL

## 2024-10-23 ENCOUNTER — EXTERNAL HOME HEALTH (OUTPATIENT)
Dept: HOME HEALTH SERVICES | Facility: HOSPITAL | Age: 59
End: 2024-10-23
Payer: MEDICARE

## 2024-10-28 NOTE — ASSESSMENT & PLAN NOTE
The patient is a 18-year-old male who presents for his well-child exam. He is accompanied by his mother.    He has experienced episodes of lightheadedness, which have been improving. He maintains good hydration, consuming at least five bottles of water daily.    He uses an inhaler as needed, particularly during physical activity, and requires a refill.    His weight has decreased from 164 to 157 pounds over the past year and a half, which he attributes to increased physical activity, including walking to and from work.    With parent out of the room, confidentiality policy of the office reviewed. He reports no issues with acne. He does not use marijuana, vaping, or alcohol. He has had one sexual partner and did not use condoms. He declines STI testing. He expresses no feelings of worthlessness or suicidal ideation.         REVIEW OF SYSTEMS is negative including Eyes, Ears, Nose, Throat, Cardiovascular, Respiratory, Gastrointestinal, Genitourinary, Musculoskeletal, Skin, Neurologic.       SOCIAL HISTORY:  Social History     Tobacco Use    Smoking status: Not on file    Smokeless tobacco: Not on file   Substance Use Topics    Alcohol use: Not on file        MEDICATIONS:  Current Outpatient Medications   Medication Sig Dispense Refill    albuterol 108 (90 Base) MCG/ACT inhaler Inhale 2 puffs into the lungs every 4 hours as needed for Shortness of Breath or Wheezing. 1 each 3     No current facility-administered medications for this visit.        FAMILY HISTORY/SOCIAL HISTORY updated in [facility] database.      PHYSICAL EXAMINATION    Blood pressure 128/70, pulse 77, temperature 98.7 °F (37.1 °C), height 5' 9\" (1.753 m), weight 71.2 kg (157 lb).  66 %ile (Z= 0.42) based on CDC (Boys, 2-20 Years) weight-for-age data using data from 10/28/2024.  46 %ile (Z= -0.09) based on CDC (Boys, 2-20 Years) Stature-for-age data based on Stature recorded on 10/28/2024.  68.6 %ile (Z= 0.49) based on CDC (Boys, 2-20 Years) BMI-for-age  Yells at mother  Cursing and abusive at times  No current meds in place.  Sertraline on med list, not in home  E-consult to psych   based on BMI available on 10/28/2024.    GENERAL: .Tyree  is an alert. male with appropriate affect. He answers questions politely and is able to engage in conversation. He  is in no acute distress.  SKIN: Skin color, texture, turgor are normal. There are no bruises, rashes or lesions. There are no bruises or other signs of injury.  HEAD: The head is atraumatic and normocephalic.  EYES: The eyelids are normal. Both eyes open. The conjunctivae appear normal.  The corneas are clear. There is no fixed deviation of gaze. Extraocular muscle movements are intact. Red reflexes are seen bilaterally; no dark spots are visualized. The disc margins are sharp bilaterally.  EARS: Tyree  responds to the spoken word. Exam of the ears reveals the pinnae to be normal. The external auditory canals are clear and the tympanic membranes are normal.   NOSE: There is no nasal flaring.  THROAT: The oropharynx is normal.   TEETH: The teeth are normal.  NECK: The neck is normal. The thyroid is not palpably enlarged.  LYMPH NODES: There are no palpably enlarged lymph nodes in the neck, axillae, or groin.  TRUNK AND THORAX: There are no lesions on the trunk. The thorax is symmetrical and longer in the transverse than in the AP (Anterior/Posterior) diameter. There are no retractions.  LUNGS: The lung fields are clear to auscultation.  HEART: The precordium is quiet. The heart rhythm is grossly regular. S1 and S2 are normal. The heart tones are strong. There are no murmurs. The femoral pulses are normal.  ABDOMEN: There is not an umbilical hernia. The abdomen is flat and soft and not tender. There are no masses. Neither the liver nor the spleen is enlarged. The bowel sounds are normal.  GENITALIA: Tyree  has normal male genitalia and Edgar Stage 4 No inguinal hernias are present.  EXTREMITIES: The extremities are normal. There is no clubbing. There is no edema.   BACK: The back is straight with no scoliosis or kyphosis. There is no asymmetry  of the rib cage, iliac crests, or scapulae.  NEUROLOGIC: Tyree  displays normal tone, sensation, and strength throughout. There are no focal deficits.  PSYCHIATRIC: Tyree  is oriented to person, place, time, and general circumstances.   He  has  no symptoms of depression.      Review Flowsheet  More data exists         10/28/2024   PHQ 2/9 Score   Peds PHQ 2 Score 0   Peds PHQ 2 Interpretation No further screening needed   Feeling down, depressed, irritable or hopeless? Not at all   Little interest or pleasure in activity? Not at all   Peds PHQ 9 Score 0   Trouble falling or staying asleep or sleeping too much? Not at all   Poor appetite, weight loss, or overeating? Not at all   Feeling tired or having little energy? Not at all   Feeling bad about yourself or that you are a failure or have let yourself or family down? Not at all   Trouble concentrating on things such as school work, reading, or watching TV? Not at all   Moving or speaking slowly that other people have noticed or the opposite - being so fidgety or restless that you have been moving around a lot more than usual? Not at all   Thoughts that you would be better off dead or of hurting yourself in some way? Not at all      Details                   DEPRESSION ASSESSMENT/PLAN:  Depression screening is negative no further plan needed.     ASSESSMENT/PLAN:   1. Well child exam.  BMI normal, growth appropriate, weight loss consistent with increased physical activity. He was advised to maintain a healthy lifestyle, including regular exercise and a balanced diet. Safe practices were discussed in case of future substance use. The importance of safe sexual practices was emphasized, with a recommendation for condom use. Sexually transmitted infection testing was suggested but declined.    2. Mild intermittent asthma  Albuterol refill provided, symptoms well controlled, no acute concerns         Return for next well exam in 1 year.    BEHAVIOR:   Tobacco, alcohol,  drug avoidance - DISCUSSED   Puberty/menstruation - DISCUSSED   Sexual activity and avoidance/sexually transmitted diseases/safe sex -DISCUSSED   Contraception- DISCUSSED   Sleep patterns- DISCUSSED   Balanced Diet- DISCUSSED    GUIDANCE:   School achievement: DISCUSSED   Peer relationships: DISCUSSED   Family relationships: DISCUSSED   Goals for life :DISCUSSED   Regular physical activity :DISCUSSED   Driving/Seat belts:DISCUSSED    The Patient denies any shortness of breath, chest pain or dizziness with activity. There is no family history of sudden death, heart disease at a young age or unexplained death.   The patient is cleared for sports activity but these symptoms were discussed and they are advised to seek further medical attention if they were to arise.       IMMUNIZATIONS:  The parents were counseled about each component of the vaccines, including possible side effects, risks and benefits.   N/a      Marily Aranda MD  10/28/2024 9:38 AM

## 2024-11-11 ENCOUNTER — DOCUMENT SCAN (OUTPATIENT)
Dept: HOME HEALTH SERVICES | Facility: HOSPITAL | Age: 59
End: 2024-11-11
Payer: MEDICARE

## 2025-03-01 ENCOUNTER — HOSPITAL ENCOUNTER (EMERGENCY)
Facility: HOSPITAL | Age: 60
Discharge: HOME | End: 2025-03-02
Attending: EMERGENCY MEDICINE
Payer: MEDICARE

## 2025-03-01 DIAGNOSIS — J44.9 CHRONIC OBSTRUCTIVE PULMONARY DISEASE, UNSPECIFIED COPD TYPE: Primary | ICD-10-CM

## 2025-03-01 DIAGNOSIS — R73.9 HYPERGLYCEMIA: ICD-10-CM

## 2025-03-01 DIAGNOSIS — R06.02 SHORTNESS OF BREATH: ICD-10-CM

## 2025-03-01 LAB
ALBUMIN SERPL BCP-MCNC: 3.3 G/DL (ref 3.5–5.2)
ALP SERPL-CCNC: 93 U/L (ref 40–150)
ALT SERPL W/O P-5'-P-CCNC: 14 U/L (ref 10–44)
ANION GAP SERPL CALC-SCNC: 8 MMOL/L (ref 8–16)
AST SERPL-CCNC: 11 U/L (ref 10–40)
BASOPHILS # BLD AUTO: 0.02 K/UL (ref 0–0.2)
BASOPHILS NFR BLD: 0.3 % (ref 0–1.9)
BILIRUB SERPL-MCNC: 0.3 MG/DL (ref 0.1–1)
BNP SERPL-MCNC: 19 PG/ML (ref 0–99)
BUN SERPL-MCNC: 24 MG/DL (ref 6–20)
CALCIUM SERPL-MCNC: 8.9 MG/DL (ref 8.7–10.5)
CHLORIDE SERPL-SCNC: 100 MMOL/L (ref 95–110)
CO2 SERPL-SCNC: 26 MMOL/L (ref 23–29)
CREAT SERPL-MCNC: 1.1 MG/DL (ref 0.5–1.4)
CTP QC/QA: YES
DIFFERENTIAL METHOD BLD: ABNORMAL
EOSINOPHIL # BLD AUTO: 0.2 K/UL (ref 0–0.5)
EOSINOPHIL NFR BLD: 3.1 % (ref 0–8)
ERYTHROCYTE [DISTWIDTH] IN BLOOD BY AUTOMATED COUNT: 15.6 % (ref 11.5–14.5)
EST. GFR  (NO RACE VARIABLE): >60 ML/MIN/1.73 M^2
GLUCOSE SERPL-MCNC: 374 MG/DL (ref 70–110)
HCT VFR BLD AUTO: 35.7 % (ref 40–54)
HGB BLD-MCNC: 11.3 G/DL (ref 14–18)
IMM GRANULOCYTES # BLD AUTO: 0.04 K/UL (ref 0–0.04)
IMM GRANULOCYTES NFR BLD AUTO: 0.5 % (ref 0–0.5)
LYMPHOCYTES # BLD AUTO: 2 K/UL (ref 1–4.8)
LYMPHOCYTES NFR BLD: 25.6 % (ref 18–48)
MCH RBC QN AUTO: 23.1 PG (ref 27–31)
MCHC RBC AUTO-ENTMCNC: 31.7 G/DL (ref 32–36)
MCV RBC AUTO: 73 FL (ref 82–98)
MONOCYTES # BLD AUTO: 0.8 K/UL (ref 0.3–1)
MONOCYTES NFR BLD: 9.9 % (ref 4–15)
NEUTROPHILS # BLD AUTO: 4.8 K/UL (ref 1.8–7.7)
NEUTROPHILS NFR BLD: 60.6 % (ref 38–73)
NRBC BLD-RTO: 0 /100 WBC
PLATELET # BLD AUTO: 312 K/UL (ref 150–450)
PMV BLD AUTO: 9.8 FL (ref 9.2–12.9)
POCT GLUCOSE: 332 MG/DL (ref 70–110)
POCT GLUCOSE: 347 MG/DL (ref 70–110)
POCT GLUCOSE: 374 MG/DL (ref 70–110)
POTASSIUM SERPL-SCNC: 4.3 MMOL/L (ref 3.5–5.1)
PROT SERPL-MCNC: 7.9 G/DL (ref 6–8.4)
RBC # BLD AUTO: 4.9 M/UL (ref 4.6–6.2)
SARS-COV-2 RDRP RESP QL NAA+PROBE: NEGATIVE
SODIUM SERPL-SCNC: 134 MMOL/L (ref 136–145)
TROPONIN I SERPL DL<=0.01 NG/ML-MCNC: <0.006 NG/ML (ref 0–0.03)
WBC # BLD AUTO: 7.84 K/UL (ref 3.9–12.7)

## 2025-03-01 PROCEDURE — 96375 TX/PRO/DX INJ NEW DRUG ADDON: CPT

## 2025-03-01 PROCEDURE — 94644 CONT INHLJ TX 1ST HOUR: CPT

## 2025-03-01 PROCEDURE — 87635 SARS-COV-2 COVID-19 AMP PRB: CPT | Performed by: EMERGENCY MEDICINE

## 2025-03-01 PROCEDURE — 93005 ELECTROCARDIOGRAM TRACING: CPT

## 2025-03-01 PROCEDURE — 25000242 PHARM REV CODE 250 ALT 637 W/ HCPCS: Performed by: EMERGENCY MEDICINE

## 2025-03-01 PROCEDURE — 80053 COMPREHEN METABOLIC PANEL: CPT | Performed by: EMERGENCY MEDICINE

## 2025-03-01 PROCEDURE — 96374 THER/PROPH/DIAG INJ IV PUSH: CPT

## 2025-03-01 PROCEDURE — 85025 COMPLETE CBC W/AUTO DIFF WBC: CPT | Performed by: EMERGENCY MEDICINE

## 2025-03-01 PROCEDURE — 94761 N-INVAS EAR/PLS OXIMETRY MLT: CPT

## 2025-03-01 PROCEDURE — 99285 EMERGENCY DEPT VISIT HI MDM: CPT | Mod: 25

## 2025-03-01 PROCEDURE — 84484 ASSAY OF TROPONIN QUANT: CPT | Performed by: EMERGENCY MEDICINE

## 2025-03-01 PROCEDURE — 83880 ASSAY OF NATRIURETIC PEPTIDE: CPT | Performed by: EMERGENCY MEDICINE

## 2025-03-01 PROCEDURE — 82962 GLUCOSE BLOOD TEST: CPT

## 2025-03-01 PROCEDURE — 93010 ELECTROCARDIOGRAM REPORT: CPT | Mod: ,,, | Performed by: INTERNAL MEDICINE

## 2025-03-01 PROCEDURE — 63600175 PHARM REV CODE 636 W HCPCS: Performed by: EMERGENCY MEDICINE

## 2025-03-01 RX ORDER — ALBUTEROL SULFATE 2.5 MG/.5ML
10 SOLUTION RESPIRATORY (INHALATION)
Status: COMPLETED | OUTPATIENT
Start: 2025-03-01 | End: 2025-03-01

## 2025-03-01 RX ORDER — METHYLPREDNISOLONE SOD SUCC 125 MG
125 VIAL (EA) INJECTION
Status: COMPLETED | OUTPATIENT
Start: 2025-03-01 | End: 2025-03-01

## 2025-03-01 RX ORDER — IPRATROPIUM BROMIDE 0.5 MG/2.5ML
1 SOLUTION RESPIRATORY (INHALATION)
Status: COMPLETED | OUTPATIENT
Start: 2025-03-01 | End: 2025-03-01

## 2025-03-01 RX ORDER — ALBUTEROL SULFATE 90 UG/1
1-2 INHALANT RESPIRATORY (INHALATION) EVERY 6 HOURS PRN
Qty: 18 G | Refills: 0 | Status: SHIPPED | OUTPATIENT
Start: 2025-03-01 | End: 2026-03-01

## 2025-03-01 RX ADMIN — ALBUTEROL SULFATE 10 MG: 2.5 SOLUTION RESPIRATORY (INHALATION) at 07:03

## 2025-03-01 RX ADMIN — IPRATROPIUM BROMIDE 1 MG: 0.5 SOLUTION RESPIRATORY (INHALATION) at 07:03

## 2025-03-01 RX ADMIN — INSULIN HUMAN 4 UNITS: 100 INJECTION, SOLUTION PARENTERAL at 08:03

## 2025-03-01 RX ADMIN — METHYLPREDNISOLONE SODIUM SUCCINATE 125 MG: 125 INJECTION, POWDER, FOR SOLUTION INTRAMUSCULAR; INTRAVENOUS at 08:03

## 2025-03-02 VITALS
RESPIRATION RATE: 28 BRPM | OXYGEN SATURATION: 98 % | BODY MASS INDEX: 21.3 KG/M2 | WEIGHT: 152.13 LBS | HEIGHT: 71 IN | SYSTOLIC BLOOD PRESSURE: 147 MMHG | HEART RATE: 97 BPM | DIASTOLIC BLOOD PRESSURE: 70 MMHG

## 2025-03-02 NOTE — DISCHARGE INSTRUCTIONS

## 2025-03-02 NOTE — ED NOTES
[facility] to bedside.  Report given. Pt to stretcher without difficulty and no obvious signs or symptoms of acute or respiratory distress noted.

## 2025-03-02 NOTE — ED NOTES
[facility] notified for an ETA for .  Reports there is a unit that just pulled up to retrieve pt at this time.

## 2025-03-02 NOTE — ED NOTES
Spoke with transfer center, request placed at [phone], transfer center states [facility] keeps pushing back 3 more hours. Asked if we could reach out to [facility]

## 2025-03-02 NOTE — ED PROVIDER NOTES
"Encounter Date: 3/1/2025       History     Chief Complaint   Patient presents with    Wheezing     Pt to ED via EMS from home with family reporting pt has been wheezing and coughing. Pt has a hx of stroke. Pt currently getting a duoneb tx via EMS.      59-year-old male past medical history of pancreatitis, CHF, COPD, diabetes, stroke presenting secondary cough and shortness of breath for the past couple days.  Patient denies any runny nose, congestion, chest pain, weakness, falls, trauma, rashes or lesions.  Patient states his only complaints of shortness breath or cough.  No fevers or chills.  No abdominal pain or nausea or vomiting.  Given a breathing treatment with EMS.  Feeling little better.        Review of patient's allergies indicates:  No Known Allergies  Past Medical History:   Diagnosis Date    Abnormal EEG 03/16/2024    3/16/2024 "Impression:   This is an abnormal awake and sleep vEEG consistent with a moderate diffuse encephalopathy and multifocal regions of cortical irritability that is most prominent in the right > left hemisphere.  No clinical events or electrographic seizures are recorded.  "    Acute on chronic pancreatitis 04/03/2016    Bacterial pneumonia 02/29/2020    Cavitary lung lesions  Parapneumonic effusion/ EMPYEMA    CHF (congestive heart failure)     Closed compression fracture of body of L1 vertebra 05/09/2020    COPD (chronic obstructive pulmonary disease) 08/08/2020    DM (diabetes mellitus), type 1     c/b DKA    HCV (hepatitis C virus)     08/2023 count < 12    HTN (hypertension)     Hx of psychiatric care     Stroke     R sided deficits    Substance abuse     IVDU heroin, methamphetamines     Past Surgical History:   Procedure Laterality Date    CARDIAC SURGERY  1999    stent placed. ([facility])    ESOPHAGOGASTRODUODENOSCOPY N/A 3/5/2020    Gastritis.  No H pylori.  Completed PPI for 1 month.  Procedure: EGD (ESOPHAGOGASTRODUODENOSCOPY);  Surgeon: Brinda Rene MD;  " Location: [facility] ENDO;  Service: Endoscopy;  Laterality: N/A;  [id number] A; [id number]    LEFT HEART CATHETERIZATION Left 9/28/2020    Procedure: Left heart cath;  Surgeon: Fito Rangel MD;  Location: [facility] CATH LAB;  Service: Cardiology;  Laterality: Left;    SHOULDER SURGERY  2013    right shoulder, spur removal.     Family History   Problem Relation Name Age of Onset    Asthma Mother       Social History[1]  Review of Systems   Constitutional:  Negative for fever.   HENT:  Negative for sore throat.    Respiratory:  Negative for shortness of breath.    Cardiovascular:  Negative for chest pain.   Gastrointestinal:  Negative for nausea.   Genitourinary:  Negative for dysuria.   Musculoskeletal:  Negative for back pain.   Skin:  Negative for rash.   Neurological:  Negative for weakness.   Hematological:  Does not bruise/bleed easily.       Physical Exam     Initial Vitals [03/01/25 1851]   BP Pulse Resp Temp SpO2   (!) 140/80 80 20 -- 100 %      MAP       --         Physical Exam    Nursing note and vitals reviewed.  Constitutional: He appears well-developed and well-nourished.   HENT:   Head: Normocephalic and atraumatic. Mouth/Throat: Oropharynx is clear and moist.   Eyes: EOM are normal. Pupils are equal, round, and reactive to light.   Neck:   Normal range of motion.  Cardiovascular:  Normal rate and regular rhythm.           Pulmonary/Chest: No stridor.   Diminished breath sounds wheezing bilaterally.   Abdominal: Abdomen is soft. Bowel sounds are normal. He exhibits no distension. There is no abdominal tenderness.   Musculoskeletal:         General: No tenderness or edema. Normal range of motion.      Cervical back: Normal range of motion.     Neurological: He is alert and oriented to person, place, and time. GCS score is 15. GCS eye subscore is 4. GCS verbal subscore is 5. GCS motor subscore is 6.   Facial droop.  Patient states that this from a stroke.   Skin: Skin is warm and dry. Capillary refill takes less than 2  seconds.   Psychiatric: He has a normal mood and affect. Thought content normal.         ED Course   Procedures  Labs Reviewed   CBC W/ AUTO DIFFERENTIAL - Abnormal       Result Value    WBC 7.84      RBC 4.90      Hemoglobin 11.3 (*)     Hematocrit 35.7 (*)     MCV 73 (*)     MCH 23.1 (*)     MCHC 31.7 (*)     RDW 15.6 (*)     Platelets 312      MPV 9.8      Immature Granulocytes 0.5      Gran # (ANC) 4.8      Immature Grans (Abs) 0.04      Lymph # 2.0      Mono # 0.8      Eos # 0.2      Baso # 0.02      nRBC 0      Gran % 60.6      Lymph % 25.6      Mono % 9.9      Eosinophil % 3.1      Basophil % 0.3      Differential Method Automated     COMPREHENSIVE METABOLIC PANEL - Abnormal    Sodium 134 (*)     Potassium 4.3      Chloride 100      CO2 26      Glucose 374 (*)     BUN 24 (*)     Creatinine 1.1      Calcium 8.9      Total Protein 7.9      Albumin 3.3 (*)     Total Bilirubin 0.3      Alkaline Phosphatase 93      AST 11      ALT 14      eGFR >60      Anion Gap 8     POCT GLUCOSE - Abnormal    POCT Glucose 347 (*)    POCT GLUCOSE - Abnormal    POCT Glucose 374 (*)    POCT GLUCOSE - Abnormal    POCT Glucose 332 (*)    TROPONIN I    Troponin I <0.006     B-TYPE NATRIURETIC PEPTIDE    BNP 19     SARS-COV-2 RDRP GENE    POC Rapid COVID Negative       Acceptable Yes     POCT GLUCOSE MONITORING CONTINUOUS     EKG Readings: (Independently Interpreted)   EKG done 1919 showing sinus tachycardia rate of 103.  No ST elevation.  Wavy baseline.  Normal axis QRS.  Other than tachycardia normal EKG.  Similar to previous.       Imaging Results              X-Ray Chest AP Portable (Final result)  Result time 03/01/25 20:04:57      Final result by Chetan Sears MD (03/01/25 20:04:57)                   Impression:      1. Chronic appearing interstitial findings, no large focal consolidation.      Electronically signed by: Chetan Sears MD  Date:    03/01/2025  Time:    20:04               Narrative:     EXAMINATION:  XR CHEST AP PORTABLE    CLINICAL HISTORY:  CHF;    TECHNIQUE:  Single frontal view of the chest was performed.    COMPARISON:  09/29/2024    FINDINGS:  The cardiomediastinal silhouette is not enlarged.  There is no pleural effusion.  The trachea is midline.  The lungs are symmetrically expanded bilaterally with coarse interstitial attenuation.  No large focal consolidation seen.  There is no pneumothorax.  The osseous structures are remarkable for degenerative change..                                       Medications   ipratropium 0.02 % nebulizer solution 1 mg (1 mg Nebulization Given 3/1/25 1940)   albuterol sulfate nebulizer solution 10 mg (10 mg Nebulization Given 3/1/25 1939)   insulin regular injection 4 Units 0.04 mL (4 Units Intravenous Given 3/1/25 2055)   methylPREDNISolone sodium succinate injection 125 mg (125 mg Intravenous Given 3/1/25 2056)     Medical Decision Making  Pt presenting 2/2 wheezing and SOB c/w COPD exacerbation. Patient started on 10mg albuterol, 1 mg ipratropium, IV steroids. Will monitor for worsening respiratory distress to considered bipap vs intubation in patient. Will reassess after treatments    Also considered but less likely:  Acs: ekg doesn't show stemi. Troponin ordered  Pna: symptoms bilaterally and no fevers.   Bronchitis: considered but hpi most c/w copd  CHF: considered. Will compare bnp to previous and cxr for fluid overload. Possible  Pneumothorax: bilateral breath sounds    Patient having labs reviewed.  Hyperglycemic.  Given IV insulin.  Not in DKA.  Patient feeling better after breathing treatments.  Discharged with medications below.  Requesting to be discharged.    Please put in 35 minutes of critical care due to patient having a high risk of respiratory failure.   Separate from teaching and exclusive of procedure and ekg time  Includes:  Time at bedside  Time reviewing test results  Time discussing case with staff  Time documenting the medical  record  Time spent with family members  Time spent with consults  Management    I discussed with the patient/family the diagnosis, treatment plan, indications for return to the emergency department, and for expected follow-up. The patient/family verbalized an understanding. The patient/family is asked if there are any questions or concerns. We discuss the case, until all issues are addressed to the patient/family's satisfaction. Patient/family understands and is agreeable to the plan.   Leon Gomez    DISCLAIMER: This note was prepared with [employer] voice recognition transcription software. Garbled syntax, mangled pronouns, and other bizarre constructions may be attributed to that software system.      Amount and/or Complexity of Data Reviewed  Labs: ordered.  Radiology: ordered.    Risk  OTC drugs.  Prescription drug management.               ED Course as of 03/01/25 2204   Sat Mar 01, 2025   2047 Patient requesting to be discharged [BA]      ED Course User Index  [BA] Leon Gomez MD                           Clinical Impression:  Final diagnoses:  [R06.02] Shortness of breath  [J44.9] Chronic obstructive pulmonary disease, unspecified COPD type (Primary)  [R73.9] Hyperglycemia          ED Disposition Condition    Discharge Stable          ED Prescriptions       Medication Sig Dispense Start Date End Date Auth. Provider    albuterol (PROVENTIL/VENTOLIN HFA) 90 mcg/actuation inhaler Inhale 1-2 puffs into the lungs every 6 (six) hours as needed. Rescue 18 g 3/1/2025 3/1/2026 Leon Gomez MD          Follow-up Information       Follow up With Specialties Details Why Contact Info    Lawanda Flores NP Family Medicine Schedule an appointment as soon as possible for a visit in 2 days  [address] [place] [address]  [address]  [facility] [id number] [phone]                   [1]   Social History  Tobacco Use    Smoking status: Former     Current packs/day: 0.00     Average packs/day: 1 pack/day for 39.2 years (39.2 ttl  pk-yrs)     Types: Cigarettes     Start date: 4/3/1981     Quit date: 2020     Years since quittin.7    Smokeless tobacco: Never    Tobacco comments:     states approx 1 per day.   Substance Use Topics    Alcohol use: Not Currently     Alcohol/week: 0.0 standard drinks of alcohol    Drug use: Yes     Types: Heroin, IV, Marijuana, Amphetamines     Comment: chronic methamphetamine user        Leon Gomez MD  [phone]

## 2025-03-02 NOTE — ED NOTES
Spoke with pt's mother and sister, they are unable to pick pt up, pt is bed bound, they are unable to accommodate him. Informed sister will ask fro ambulance to bring pt home, she states that her mother will be at home.

## 2025-03-02 NOTE — ED NOTES
[facility] notified and reports there is currently a unit @ the [facility] to  the pt.

## 2025-03-04 LAB
OHS QRS DURATION: 88 MS
OHS QTC CALCULATION: 487 MS

## 2025-07-29 NOTE — NURSING
[facility]  Nurses Note -- 4 Eyes      12/14/2023       Skin assessed on: Q Shift      [] No Pressure Injuries Present    []Prevention Measures Documented    [x] Yes LDA  for Pressure Injury Previously documented     [] Yes New Pressure Injury Discovered   [] LDA for New Pressure Injury Added      Attending RN:  Shoaib Abdi RN     Second RN:  FREDERIC Rockwell        Reached out to Patient to schedule PNS with Dr. Lou.  Patient stated she needed time to think about it and would call back when ready to schedule.

## 2025-08-08 ENCOUNTER — HOSPITAL ENCOUNTER (INPATIENT)
Facility: HOSPITAL | Age: 60
LOS: 3 days | Discharge: HOME | DRG: 177 | End: 2025-08-11
Attending: EMERGENCY MEDICINE | Admitting: STUDENT IN AN ORGANIZED HEALTH CARE EDUCATION/TRAINING PROGRAM
Payer: MEDICARE

## 2025-08-08 DIAGNOSIS — L30.8 DERMATITIS ASSOCIATED WITH MOISTURE: ICD-10-CM

## 2025-08-08 DIAGNOSIS — R09.02 HYPOXIA: ICD-10-CM

## 2025-08-08 DIAGNOSIS — D64.9 ANEMIA, UNSPECIFIED TYPE: ICD-10-CM

## 2025-08-08 DIAGNOSIS — J96.01 ACUTE HYPOXEMIC RESPIRATORY FAILURE: ICD-10-CM

## 2025-08-08 DIAGNOSIS — G93.40 ACUTE ENCEPHALOPATHY: ICD-10-CM

## 2025-08-08 DIAGNOSIS — J69.0 ASPIRATION PNEUMONIA OF RIGHT LUNG, UNSPECIFIED ASPIRATION PNEUMONIA TYPE, UNSPECIFIED PART OF LUNG: Primary | ICD-10-CM

## 2025-08-08 DIAGNOSIS — R00.0 TACHYCARDIA: ICD-10-CM

## 2025-08-08 PROBLEM — E11.9 TYPE 2 DIABETES MELLITUS: Status: ACTIVE | Noted: 2019-05-24

## 2025-08-08 PROBLEM — Z74.01 BEDBOUND: Status: ACTIVE | Noted: 2024-10-29

## 2025-08-08 PROBLEM — L89.301 PRESSURE INJURY OF BUTTOCK, STAGE 1: Status: ACTIVE | Noted: 2024-10-29

## 2025-08-08 PROBLEM — E78.5 HYPERLIPIDEMIA: Status: ACTIVE | Noted: 2021-05-12

## 2025-08-08 PROBLEM — J45.909 ASTHMA, WELL CONTROLLED: Status: ACTIVE | Noted: 2020-12-23

## 2025-08-08 LAB
ABSOLUTE EOSINOPHIL (OHS): 0.31 K/UL
ABSOLUTE MONOCYTE (OHS): 0.74 K/UL (ref 0.3–1)
ABSOLUTE NEUTROPHIL COUNT (OHS): 6.86 K/UL (ref 1.8–7.7)
ALBUMIN SERPL BCP-MCNC: 3.2 G/DL (ref 3.5–5.2)
ALLENS TEST: ABNORMAL
ALP SERPL-CCNC: 103 UNIT/L (ref 40–150)
ALT SERPL W/O P-5'-P-CCNC: 10 UNIT/L (ref 10–44)
AMMONIA PLAS-SCNC: 38 UMOL/L (ref 10–50)
AMPHET UR QL SCN: NEGATIVE
AMPHET UR QL SCN: NEGATIVE
ANION GAP (OHS): 11 MMOL/L (ref 8–16)
AST SERPL-CCNC: 14 UNIT/L (ref 11–45)
B-OH-BUTYR BLD STRIP-SCNC: 0.3 MMOL/L
BACTERIA #/AREA URNS AUTO: ABNORMAL /HPF
BARBITURATE SCN PRESENT UR: NEGATIVE
BARBITURATE SCN PRESENT UR: NEGATIVE
BASOPHILS # BLD AUTO: 0.02 K/UL
BASOPHILS NFR BLD AUTO: 0.2 %
BENZODIAZ UR QL SCN: NEGATIVE
BENZODIAZ UR QL SCN: NEGATIVE
BILIRUB SERPL-MCNC: 0.3 MG/DL (ref 0.1–1)
BILIRUB UR QL STRIP.AUTO: NEGATIVE
BUN SERPL-MCNC: 17 MG/DL (ref 6–20)
CALCIUM SERPL-MCNC: 9.1 MG/DL (ref 8.7–10.5)
CANNABINOIDS UR QL SCN: NEGATIVE
CANNABINOIDS UR QL SCN: NEGATIVE
CHLORIDE SERPL-SCNC: 97 MMOL/L (ref 95–110)
CLARITY UR: CLEAR
CO2 SERPL-SCNC: 25 MMOL/L (ref 23–29)
COCAINE UR QL SCN: NEGATIVE
COCAINE UR QL SCN: NEGATIVE
COLOR UR AUTO: YELLOW
CREAT SERPL-MCNC: 0.8 MG/DL (ref 0.5–1.4)
CREAT UR-MCNC: 37.6 MG/DL (ref 23–375)
CREAT UR-MCNC: 39.8 MG/DL (ref 23–375)
CTP QC/QA: YES
CTP QC/QA: YES
DELSYS: ABNORMAL
ERYTHROCYTE [DISTWIDTH] IN BLOOD BY AUTOMATED COUNT: 16.5 % (ref 11.5–14.5)
ETHANOL SERPL-MCNC: <10 MG/DL
GFR SERPLBLD CREATININE-BSD FMLA CKD-EPI: >60 ML/MIN/1.73/M2
GLUCOSE SERPL-MCNC: 370 MG/DL (ref 70–110)
GLUCOSE UR QL STRIP: ABNORMAL
HCO3 UR-SCNC: 29.2 MMOL/L (ref 24–28)
HCT VFR BLD AUTO: 37.1 % (ref 40–54)
HGB BLD-MCNC: 11 GM/DL (ref 14–18)
HGB UR QL STRIP: ABNORMAL
HYALINE CASTS UR QL AUTO: 0 /LPF (ref 0–1)
IMM GRANULOCYTES # BLD AUTO: 0.04 K/UL (ref 0–0.04)
IMM GRANULOCYTES NFR BLD AUTO: 0.5 % (ref 0–0.5)
IRON SATN MFR SERPL: 3 % (ref 20–50)
IRON SERPL-MCNC: 13 UG/DL (ref 45–160)
KETONES UR QL STRIP: NEGATIVE
LACTATE SERPL-SCNC: 1.8 MMOL/L (ref 0.5–2.2)
LEUKOCYTE ESTERASE UR QL STRIP: NEGATIVE
LIPASE SERPL-CCNC: 19 U/L (ref 4–60)
LYMPHOCYTES # BLD AUTO: 0.83 K/UL (ref 1–4.8)
MAGNESIUM SERPL-MCNC: 1.8 MG/DL (ref 1.6–2.6)
MCH RBC QN AUTO: 21.1 PG (ref 27–31)
MCHC RBC AUTO-ENTMCNC: 29.6 G/DL (ref 32–36)
MCV RBC AUTO: 71 FL (ref 82–98)
METHADONE UR QL SCN: NEGATIVE
METHADONE UR QL SCN: NEGATIVE
MICROSCOPIC COMMENT: ABNORMAL
MODE: ABNORMAL
NITRITE UR QL STRIP: NEGATIVE
NT-PROBNP SERPL-MCNC: 209 PG/ML
NUCLEATED RBC (/100WBC) (OHS): 0 /100 WBC
OPIATES UR QL SCN: NEGATIVE
OPIATES UR QL SCN: NEGATIVE
PCO2 BLDA: 49.3 MMHG (ref 35–45)
PCP UR QL: NEGATIVE
PCP UR QL: NEGATIVE
PH SMN: 7.38 [PH] (ref 7.35–7.45)
PH UR STRIP: 7 [PH]
PLATELET # BLD AUTO: 410 K/UL (ref 150–450)
PMV BLD AUTO: 9.5 FL (ref 9.2–12.9)
PO2 BLDA: 31 MMHG (ref 40–60)
POC BE: 3 MMOL/L (ref -2–2)
POC MOLECULAR INFLUENZA A AGN: NEGATIVE
POC MOLECULAR INFLUENZA B AGN: NEGATIVE
POC SATURATED O2: 58 % (ref 95–100)
POC TCO2: 31 MMOL/L (ref 24–29)
POCT GLUCOSE: 221 MG/DL (ref 70–110)
POCT GLUCOSE: 330 MG/DL (ref 70–110)
POCT GLUCOSE: 372 MG/DL (ref 70–110)
POTASSIUM SERPL-SCNC: 4.3 MMOL/L (ref 3.5–5.1)
PROCALCITONIN SERPL-MCNC: 0.12 NG/ML
PROT SERPL-MCNC: 7.9 GM/DL (ref 6–8.4)
PROT UR QL STRIP: ABNORMAL
RBC # BLD AUTO: 5.22 M/UL (ref 4.6–6.2)
RBC #/AREA URNS AUTO: 6 /HPF (ref 0–4)
RELATIVE EOSINOPHIL (OHS): 3.5 %
RELATIVE LYMPHOCYTE (OHS): 9.4 % (ref 18–48)
RELATIVE MONOCYTE (OHS): 8.4 % (ref 4–15)
RELATIVE NEUTROPHIL (OHS): 78 % (ref 38–73)
SAMPLE: ABNORMAL
SARS-COV-2 RDRP RESP QL NAA+PROBE: NEGATIVE
SITE: ABNORMAL
SODIUM SERPL-SCNC: 133 MMOL/L (ref 136–145)
SP GR UR STRIP: >=1.03
TIBC SERPL-MCNC: 392 UG/DL (ref 250–450)
TRANSFERRIN SERPL-MCNC: 265 MG/DL (ref 200–375)
TROPONIN I SERPL HS-MCNC: 4 NG/L
TROPONIN I SERPL HS-MCNC: 6 NG/L
TSH SERPL-ACNC: 0.68 UIU/ML (ref 0.4–4)
UROBILINOGEN UR STRIP-ACNC: NEGATIVE EU/DL
WBC # BLD AUTO: 8.8 K/UL (ref 3.9–12.7)
WBC #/AREA URNS AUTO: 0 /HPF (ref 0–5)
YEAST UR QL AUTO: ABNORMAL /HPF

## 2025-08-08 PROCEDURE — 83735 ASSAY OF MAGNESIUM: CPT | Performed by: EMERGENCY MEDICINE

## 2025-08-08 PROCEDURE — 96375 TX/PRO/DX INJ NEW DRUG ADDON: CPT

## 2025-08-08 PROCEDURE — A4216 STERILE WATER/SALINE, 10 ML: HCPCS | Performed by: PHYSICIAN ASSISTANT

## 2025-08-08 PROCEDURE — 83690 ASSAY OF LIPASE: CPT | Performed by: EMERGENCY MEDICINE

## 2025-08-08 PROCEDURE — 25000003 PHARM REV CODE 250: Performed by: STUDENT IN AN ORGANIZED HEALTH CARE EDUCATION/TRAINING PROGRAM

## 2025-08-08 PROCEDURE — 84484 ASSAY OF TROPONIN QUANT: CPT | Performed by: PHYSICIAN ASSISTANT

## 2025-08-08 PROCEDURE — 81001 URINALYSIS AUTO W/SCOPE: CPT | Performed by: EMERGENCY MEDICINE

## 2025-08-08 PROCEDURE — 36415 COLL VENOUS BLD VENIPUNCTURE: CPT | Performed by: PHYSICIAN ASSISTANT

## 2025-08-08 PROCEDURE — 80307 DRUG TEST PRSMV CHEM ANLYZR: CPT | Performed by: STUDENT IN AN ORGANIZED HEALTH CARE EDUCATION/TRAINING PROGRAM

## 2025-08-08 PROCEDURE — 94799 UNLISTED PULMONARY SVC/PX: CPT

## 2025-08-08 PROCEDURE — 87502 INFLUENZA DNA AMP PROBE: CPT

## 2025-08-08 PROCEDURE — 83605 ASSAY OF LACTIC ACID: CPT | Performed by: EMERGENCY MEDICINE

## 2025-08-08 PROCEDURE — 99900035 HC TECH TIME PER 15 MIN (STAT)

## 2025-08-08 PROCEDURE — 25500020 PHARM REV CODE 255: Performed by: EMERGENCY MEDICINE

## 2025-08-08 PROCEDURE — 83540 ASSAY OF IRON: CPT | Performed by: STUDENT IN AN ORGANIZED HEALTH CARE EDUCATION/TRAINING PROGRAM

## 2025-08-08 PROCEDURE — 84484 ASSAY OF TROPONIN QUANT: CPT | Performed by: EMERGENCY MEDICINE

## 2025-08-08 PROCEDURE — 25000242 PHARM REV CODE 250 ALT 637 W/ HCPCS: Performed by: EMERGENCY MEDICINE

## 2025-08-08 PROCEDURE — 87635 SARS-COV-2 COVID-19 AMP PRB: CPT | Performed by: EMERGENCY MEDICINE

## 2025-08-08 PROCEDURE — 25000003 PHARM REV CODE 250: Performed by: EMERGENCY MEDICINE

## 2025-08-08 PROCEDURE — 82962 GLUCOSE BLOOD TEST: CPT

## 2025-08-08 PROCEDURE — 63600175 PHARM REV CODE 636 W HCPCS: Performed by: PHYSICIAN ASSISTANT

## 2025-08-08 PROCEDURE — 82803 BLOOD GASES ANY COMBINATION: CPT

## 2025-08-08 PROCEDURE — 99285 EMERGENCY DEPT VISIT HI MDM: CPT | Mod: 25

## 2025-08-08 PROCEDURE — 93010 ELECTROCARDIOGRAM REPORT: CPT | Mod: ,,, | Performed by: INTERNAL MEDICINE

## 2025-08-08 PROCEDURE — 11000001 HC ACUTE MED/SURG PRIVATE ROOM

## 2025-08-08 PROCEDURE — 94761 N-INVAS EAR/PLS OXIMETRY MLT: CPT | Mod: XB

## 2025-08-08 PROCEDURE — 94640 AIRWAY INHALATION TREATMENT: CPT

## 2025-08-08 PROCEDURE — 93005 ELECTROCARDIOGRAM TRACING: CPT

## 2025-08-08 PROCEDURE — 87040 BLOOD CULTURE FOR BACTERIA: CPT | Performed by: EMERGENCY MEDICINE

## 2025-08-08 PROCEDURE — 82010 KETONE BODYS QUAN: CPT | Performed by: EMERGENCY MEDICINE

## 2025-08-08 PROCEDURE — 83880 ASSAY OF NATRIURETIC PEPTIDE: CPT | Performed by: EMERGENCY MEDICINE

## 2025-08-08 PROCEDURE — 63600175 PHARM REV CODE 636 W HCPCS: Performed by: EMERGENCY MEDICINE

## 2025-08-08 PROCEDURE — 25000003 PHARM REV CODE 250: Performed by: PHYSICIAN ASSISTANT

## 2025-08-08 PROCEDURE — 82140 ASSAY OF AMMONIA: CPT | Performed by: PHYSICIAN ASSISTANT

## 2025-08-08 PROCEDURE — 85025 COMPLETE CBC W/AUTO DIFF WBC: CPT | Performed by: EMERGENCY MEDICINE

## 2025-08-08 PROCEDURE — 96361 HYDRATE IV INFUSION ADD-ON: CPT

## 2025-08-08 PROCEDURE — 84155 ASSAY OF PROTEIN SERUM: CPT | Performed by: EMERGENCY MEDICINE

## 2025-08-08 PROCEDURE — 80307 DRUG TEST PRSMV CHEM ANLYZR: CPT | Performed by: EMERGENCY MEDICINE

## 2025-08-08 PROCEDURE — 84145 PROCALCITONIN (PCT): CPT | Performed by: EMERGENCY MEDICINE

## 2025-08-08 PROCEDURE — 96374 THER/PROPH/DIAG INJ IV PUSH: CPT

## 2025-08-08 PROCEDURE — 82077 ASSAY SPEC XCP UR&BREATH IA: CPT | Performed by: EMERGENCY MEDICINE

## 2025-08-08 PROCEDURE — 84443 ASSAY THYROID STIM HORMONE: CPT | Performed by: EMERGENCY MEDICINE

## 2025-08-08 RX ORDER — MUPIROCIN 20 MG/G
OINTMENT TOPICAL 2 TIMES DAILY
Status: DISCONTINUED | OUTPATIENT
Start: 2025-08-08 | End: 2025-08-11 | Stop reason: HOSPADM

## 2025-08-08 RX ORDER — ATORVASTATIN CALCIUM 20 MG/1
1 TABLET, FILM COATED ORAL DAILY
COMMUNITY
Start: 2024-10-25

## 2025-08-08 RX ORDER — ACETAMINOPHEN 325 MG/1
650 TABLET ORAL EVERY 6 HOURS PRN
Status: DISCONTINUED | OUTPATIENT
Start: 2025-08-08 | End: 2025-08-11 | Stop reason: HOSPADM

## 2025-08-08 RX ORDER — NALOXONE HCL 0.4 MG/ML
0.02 VIAL (ML) INJECTION
Status: DISCONTINUED | OUTPATIENT
Start: 2025-08-08 | End: 2025-08-11 | Stop reason: HOSPADM

## 2025-08-08 RX ORDER — GLUCAGON 1 MG
1 KIT INJECTION
Status: DISCONTINUED | OUTPATIENT
Start: 2025-08-08 | End: 2025-08-11 | Stop reason: HOSPADM

## 2025-08-08 RX ORDER — IBUPROFEN 200 MG
24 TABLET ORAL
Status: DISCONTINUED | OUTPATIENT
Start: 2025-08-08 | End: 2025-08-11 | Stop reason: HOSPADM

## 2025-08-08 RX ORDER — DIAZEPAM 5 MG/1
5 TABLET ORAL EVERY 4 HOURS PRN
Status: DISCONTINUED | OUTPATIENT
Start: 2025-08-08 | End: 2025-08-11 | Stop reason: HOSPADM

## 2025-08-08 RX ORDER — INSULIN GLARGINE 100 [IU]/ML
12 INJECTION, SOLUTION SUBCUTANEOUS 2 TIMES DAILY
COMMUNITY

## 2025-08-08 RX ORDER — INSULIN GLARGINE 100 [IU]/ML
8 INJECTION, SOLUTION SUBCUTANEOUS 2 TIMES DAILY
Status: DISCONTINUED | OUTPATIENT
Start: 2025-08-08 | End: 2025-08-08

## 2025-08-08 RX ORDER — IPRATROPIUM BROMIDE AND ALBUTEROL SULFATE 2.5; .5 MG/3ML; MG/3ML
3 SOLUTION RESPIRATORY (INHALATION)
Status: COMPLETED | OUTPATIENT
Start: 2025-08-08 | End: 2025-08-08

## 2025-08-08 RX ORDER — SODIUM CHLORIDE 0.9 % (FLUSH) 0.9 %
10 SYRINGE (ML) INJECTION EVERY 8 HOURS
Status: DISCONTINUED | OUTPATIENT
Start: 2025-08-08 | End: 2025-08-11 | Stop reason: HOSPADM

## 2025-08-08 RX ORDER — AMOXICILLIN 250 MG
1 CAPSULE ORAL 2 TIMES DAILY PRN
Status: DISCONTINUED | OUTPATIENT
Start: 2025-08-08 | End: 2025-08-11 | Stop reason: HOSPADM

## 2025-08-08 RX ORDER — LANOLIN ALCOHOL/MO/W.PET/CERES
100 CREAM (GRAM) TOPICAL DAILY
Status: DISCONTINUED | OUTPATIENT
Start: 2025-08-09 | End: 2025-08-11 | Stop reason: HOSPADM

## 2025-08-08 RX ORDER — FOLIC ACID 1 MG/1
1 TABLET ORAL DAILY
Status: DISCONTINUED | OUTPATIENT
Start: 2025-08-09 | End: 2025-08-11 | Stop reason: HOSPADM

## 2025-08-08 RX ORDER — IBUPROFEN 200 MG
16 TABLET ORAL
Status: DISCONTINUED | OUTPATIENT
Start: 2025-08-08 | End: 2025-08-11 | Stop reason: HOSPADM

## 2025-08-08 RX ORDER — TALC
6 POWDER (GRAM) TOPICAL NIGHTLY PRN
Status: DISCONTINUED | OUTPATIENT
Start: 2025-08-08 | End: 2025-08-11 | Stop reason: HOSPADM

## 2025-08-08 RX ORDER — LEVALBUTEROL 1.25 MG/.5ML
1.25 SOLUTION, CONCENTRATE RESPIRATORY (INHALATION) EVERY 6 HOURS PRN
Status: DISCONTINUED | OUTPATIENT
Start: 2025-08-08 | End: 2025-08-11 | Stop reason: HOSPADM

## 2025-08-08 RX ORDER — SODIUM CHLORIDE 9 MG/ML
INJECTION, SOLUTION INTRAVENOUS CONTINUOUS
Status: DISCONTINUED | OUTPATIENT
Start: 2025-08-08 | End: 2025-08-11 | Stop reason: HOSPADM

## 2025-08-08 RX ORDER — INSULIN GLARGINE 100 [IU]/ML
10 INJECTION, SOLUTION SUBCUTANEOUS 2 TIMES DAILY
Status: DISCONTINUED | OUTPATIENT
Start: 2025-08-09 | End: 2025-08-09

## 2025-08-08 RX ORDER — METHYLPREDNISOLONE SOD SUCC 125 MG
125 VIAL (EA) INJECTION
Status: ACTIVE | OUTPATIENT
Start: 2025-08-08 | End: 2025-08-09

## 2025-08-08 RX ADMIN — SODIUM CHLORIDE, POTASSIUM CHLORIDE, SODIUM LACTATE AND CALCIUM CHLORIDE 500 ML: 600; 310; 30; 20 INJECTION, SOLUTION INTRAVENOUS at 01:08

## 2025-08-08 RX ADMIN — INSULIN HUMAN 4 UNITS: 100 INJECTION, SOLUTION PARENTERAL at 01:08

## 2025-08-08 RX ADMIN — AMPICILLIN SODIUM AND SULBACTAM SODIUM 3 G: 2; 1 INJECTION, POWDER, FOR SOLUTION INTRAMUSCULAR; INTRAVENOUS at 10:08

## 2025-08-08 RX ADMIN — AMPICILLIN SODIUM AND SULBACTAM SODIUM 1.5 G: 1; .5 INJECTION, POWDER, FOR SOLUTION INTRAMUSCULAR; INTRAVENOUS at 03:08

## 2025-08-08 RX ADMIN — MUPIROCIN: 20 OINTMENT TOPICAL at 10:08

## 2025-08-08 RX ADMIN — Medication 10 ML: at 10:08

## 2025-08-08 RX ADMIN — IOHEXOL 80 ML: 350 INJECTION, SOLUTION INTRAVENOUS at 01:08

## 2025-08-08 RX ADMIN — IPRATROPIUM BROMIDE AND ALBUTEROL SULFATE 3 ML: 2.5; .5 SOLUTION RESPIRATORY (INHALATION) at 12:08

## 2025-08-08 RX ADMIN — SODIUM CHLORIDE: 9 INJECTION, SOLUTION INTRAVENOUS at 06:08

## 2025-08-09 PROBLEM — D64.9 ANEMIA: Status: ACTIVE | Noted: 2025-08-09

## 2025-08-09 LAB
ABSOLUTE EOSINOPHIL (OHS): 0.27 K/UL
ABSOLUTE MONOCYTE (OHS): 1.09 K/UL (ref 0.3–1)
ABSOLUTE NEUTROPHIL COUNT (OHS): 7.72 K/UL (ref 1.8–7.7)
ANION GAP (OHS): 8 MMOL/L (ref 8–16)
BASOPHILS # BLD AUTO: 0.03 K/UL
BASOPHILS NFR BLD AUTO: 0.3 %
BUN SERPL-MCNC: 16 MG/DL (ref 6–20)
CALCIUM SERPL-MCNC: 8.3 MG/DL (ref 8.7–10.5)
CHLORIDE SERPL-SCNC: 100 MMOL/L (ref 95–110)
CO2 SERPL-SCNC: 25 MMOL/L (ref 23–29)
CREAT SERPL-MCNC: 0.7 MG/DL (ref 0.5–1.4)
ERYTHROCYTE [DISTWIDTH] IN BLOOD BY AUTOMATED COUNT: 16.5 % (ref 11.5–14.5)
GFR SERPLBLD CREATININE-BSD FMLA CKD-EPI: >60 ML/MIN/1.73/M2
GLUCOSE SERPL-MCNC: 236 MG/DL (ref 70–110)
HCT VFR BLD AUTO: 33.2 % (ref 40–54)
HGB BLD-MCNC: 9.7 GM/DL (ref 14–18)
HOLD SPECIMEN: NORMAL
IMM GRANULOCYTES # BLD AUTO: 0.06 K/UL (ref 0–0.04)
IMM GRANULOCYTES NFR BLD AUTO: 0.6 % (ref 0–0.5)
LYMPHOCYTES # BLD AUTO: 1.34 K/UL (ref 1–4.8)
MAGNESIUM SERPL-MCNC: 1.8 MG/DL (ref 1.6–2.6)
MCH RBC QN AUTO: 20.8 PG (ref 27–31)
MCHC RBC AUTO-ENTMCNC: 29.2 G/DL (ref 32–36)
MCV RBC AUTO: 71 FL (ref 82–98)
NUCLEATED RBC (/100WBC) (OHS): 0 /100 WBC
OHS QRS DURATION: 88 MS
OHS QTC CALCULATION: 473 MS
PLATELET # BLD AUTO: 342 K/UL (ref 150–450)
PMV BLD AUTO: 9.7 FL (ref 9.2–12.9)
POCT GLUCOSE: 116 MG/DL (ref 70–110)
POCT GLUCOSE: 177 MG/DL (ref 70–110)
POCT GLUCOSE: 251 MG/DL (ref 70–110)
POCT GLUCOSE: 277 MG/DL (ref 70–110)
POTASSIUM SERPL-SCNC: 3.8 MMOL/L (ref 3.5–5.1)
RBC # BLD AUTO: 4.66 M/UL (ref 4.6–6.2)
RELATIVE EOSINOPHIL (OHS): 2.6 %
RELATIVE LYMPHOCYTE (OHS): 12.7 % (ref 18–48)
RELATIVE MONOCYTE (OHS): 10.4 % (ref 4–15)
RELATIVE NEUTROPHIL (OHS): 73.4 % (ref 38–73)
SODIUM SERPL-SCNC: 133 MMOL/L (ref 136–145)
WBC # BLD AUTO: 10.51 K/UL (ref 3.9–12.7)

## 2025-08-09 PROCEDURE — A4216 STERILE WATER/SALINE, 10 ML: HCPCS | Performed by: PHYSICIAN ASSISTANT

## 2025-08-09 PROCEDURE — 36415 COLL VENOUS BLD VENIPUNCTURE: CPT | Performed by: PHYSICIAN ASSISTANT

## 2025-08-09 PROCEDURE — 94668 MNPJ CHEST WALL SBSQ: CPT

## 2025-08-09 PROCEDURE — 27000221 HC OXYGEN, UP TO 24 HOURS

## 2025-08-09 PROCEDURE — 92526 ORAL FUNCTION THERAPY: CPT

## 2025-08-09 PROCEDURE — 99900035 HC TECH TIME PER 15 MIN (STAT)

## 2025-08-09 PROCEDURE — 25000003 PHARM REV CODE 250: Performed by: STUDENT IN AN ORGANIZED HEALTH CARE EDUCATION/TRAINING PROGRAM

## 2025-08-09 PROCEDURE — 94761 N-INVAS EAR/PLS OXIMETRY MLT: CPT

## 2025-08-09 PROCEDURE — 85025 COMPLETE CBC W/AUTO DIFF WBC: CPT | Performed by: PHYSICIAN ASSISTANT

## 2025-08-09 PROCEDURE — 83735 ASSAY OF MAGNESIUM: CPT | Performed by: PHYSICIAN ASSISTANT

## 2025-08-09 PROCEDURE — 25000003 PHARM REV CODE 250: Performed by: PHYSICIAN ASSISTANT

## 2025-08-09 PROCEDURE — 63600175 PHARM REV CODE 636 W HCPCS: Performed by: STUDENT IN AN ORGANIZED HEALTH CARE EDUCATION/TRAINING PROGRAM

## 2025-08-09 PROCEDURE — 63600175 PHARM REV CODE 636 W HCPCS: Performed by: PHYSICIAN ASSISTANT

## 2025-08-09 PROCEDURE — 94667 MNPJ CHEST WALL 1ST: CPT

## 2025-08-09 PROCEDURE — 92610 EVALUATE SWALLOWING FUNCTION: CPT

## 2025-08-09 PROCEDURE — 11000001 HC ACUTE MED/SURG PRIVATE ROOM

## 2025-08-09 PROCEDURE — 80048 BASIC METABOLIC PNL TOTAL CA: CPT | Performed by: PHYSICIAN ASSISTANT

## 2025-08-09 RX ORDER — INSULIN GLARGINE 100 [IU]/ML
12 INJECTION, SOLUTION SUBCUTANEOUS 2 TIMES DAILY
Status: DISCONTINUED | OUTPATIENT
Start: 2025-08-09 | End: 2025-08-10

## 2025-08-09 RX ADMIN — MUPIROCIN: 20 OINTMENT TOPICAL at 09:08

## 2025-08-09 RX ADMIN — FOLIC ACID 1 MG: 1 TABLET ORAL at 10:08

## 2025-08-09 RX ADMIN — SODIUM CHLORIDE: 9 INJECTION, SOLUTION INTRAVENOUS at 11:08

## 2025-08-09 RX ADMIN — AMPICILLIN SODIUM AND SULBACTAM SODIUM 3 G: 2; 1 INJECTION, POWDER, FOR SOLUTION INTRAMUSCULAR; INTRAVENOUS at 05:08

## 2025-08-09 RX ADMIN — SODIUM CHLORIDE: 9 INJECTION, SOLUTION INTRAVENOUS at 03:08

## 2025-08-09 RX ADMIN — SODIUM CHLORIDE: 9 INJECTION, SOLUTION INTRAVENOUS at 01:08

## 2025-08-09 RX ADMIN — Medication 10 ML: at 01:08

## 2025-08-09 RX ADMIN — INSULIN GLARGINE 12 UNITS: 100 INJECTION, SOLUTION SUBCUTANEOUS at 10:08

## 2025-08-09 RX ADMIN — AMPICILLIN SODIUM AND SULBACTAM SODIUM 3 G: 2; 1 INJECTION, POWDER, FOR SOLUTION INTRAMUSCULAR; INTRAVENOUS at 01:08

## 2025-08-09 RX ADMIN — AMPICILLIN SODIUM AND SULBACTAM SODIUM 3 G: 2; 1 INJECTION, POWDER, FOR SOLUTION INTRAMUSCULAR; INTRAVENOUS at 09:08

## 2025-08-09 RX ADMIN — THIAMINE HCL TAB 100 MG 100 MG: 100 TAB at 10:08

## 2025-08-09 RX ADMIN — MUPIROCIN: 20 OINTMENT TOPICAL at 10:08

## 2025-08-09 RX ADMIN — THERA TABS 1 TABLET: TAB at 10:08

## 2025-08-09 RX ADMIN — INSULIN GLARGINE 12 UNITS: 100 INJECTION, SOLUTION SUBCUTANEOUS at 09:08

## 2025-08-09 RX ADMIN — Medication 10 ML: at 09:08

## 2025-08-10 LAB
ABSOLUTE EOSINOPHIL (OHS): 0.48 K/UL
ABSOLUTE MONOCYTE (OHS): 0.81 K/UL (ref 0.3–1)
ABSOLUTE NEUTROPHIL COUNT (OHS): 5.16 K/UL (ref 1.8–7.7)
ANION GAP (OHS): 6 MMOL/L (ref 8–16)
BASOPHILS # BLD AUTO: 0.02 K/UL
BASOPHILS NFR BLD AUTO: 0.2 %
BUN SERPL-MCNC: 10 MG/DL (ref 6–20)
CALCIUM SERPL-MCNC: 8.1 MG/DL (ref 8.7–10.5)
CHLORIDE SERPL-SCNC: 105 MMOL/L (ref 95–110)
CO2 SERPL-SCNC: 26 MMOL/L (ref 23–29)
CREAT SERPL-MCNC: 0.6 MG/DL (ref 0.5–1.4)
ERYTHROCYTE [DISTWIDTH] IN BLOOD BY AUTOMATED COUNT: 16.5 % (ref 11.5–14.5)
GFR SERPLBLD CREATININE-BSD FMLA CKD-EPI: >60 ML/MIN/1.73/M2
GLUCOSE SERPL-MCNC: 222 MG/DL (ref 70–110)
HCT VFR BLD AUTO: 32.2 % (ref 40–54)
HGB BLD-MCNC: 9.4 GM/DL (ref 14–18)
IMM GRANULOCYTES # BLD AUTO: 0.03 K/UL (ref 0–0.04)
IMM GRANULOCYTES NFR BLD AUTO: 0.4 % (ref 0–0.5)
LYMPHOCYTES # BLD AUTO: 1.57 K/UL (ref 1–4.8)
MAGNESIUM SERPL-MCNC: 1.8 MG/DL (ref 1.6–2.6)
MCH RBC QN AUTO: 21.2 PG (ref 27–31)
MCHC RBC AUTO-ENTMCNC: 29.2 G/DL (ref 32–36)
MCV RBC AUTO: 73 FL (ref 82–98)
NUCLEATED RBC (/100WBC) (OHS): 0 /100 WBC
PLATELET # BLD AUTO: 399 K/UL (ref 150–450)
PMV BLD AUTO: 9.6 FL (ref 9.2–12.9)
POCT GLUCOSE: 180 MG/DL (ref 70–110)
POCT GLUCOSE: 251 MG/DL (ref 70–110)
POCT GLUCOSE: 335 MG/DL (ref 70–110)
POTASSIUM SERPL-SCNC: 3.4 MMOL/L (ref 3.5–5.1)
RBC # BLD AUTO: 4.44 M/UL (ref 4.6–6.2)
RELATIVE EOSINOPHIL (OHS): 5.9 %
RELATIVE LYMPHOCYTE (OHS): 19.5 % (ref 18–48)
RELATIVE MONOCYTE (OHS): 10 % (ref 4–15)
RELATIVE NEUTROPHIL (OHS): 64 % (ref 38–73)
SODIUM SERPL-SCNC: 137 MMOL/L (ref 136–145)
WBC # BLD AUTO: 8.07 K/UL (ref 3.9–12.7)

## 2025-08-10 PROCEDURE — 94761 N-INVAS EAR/PLS OXIMETRY MLT: CPT

## 2025-08-10 PROCEDURE — 99900035 HC TECH TIME PER 15 MIN (STAT)

## 2025-08-10 PROCEDURE — 85025 COMPLETE CBC W/AUTO DIFF WBC: CPT | Performed by: PHYSICIAN ASSISTANT

## 2025-08-10 PROCEDURE — A4216 STERILE WATER/SALINE, 10 ML: HCPCS | Performed by: PHYSICIAN ASSISTANT

## 2025-08-10 PROCEDURE — 25000003 PHARM REV CODE 250: Performed by: STUDENT IN AN ORGANIZED HEALTH CARE EDUCATION/TRAINING PROGRAM

## 2025-08-10 PROCEDURE — 25000003 PHARM REV CODE 250: Performed by: PHYSICIAN ASSISTANT

## 2025-08-10 PROCEDURE — 83735 ASSAY OF MAGNESIUM: CPT | Performed by: PHYSICIAN ASSISTANT

## 2025-08-10 PROCEDURE — 94664 DEMO&/EVAL PT USE INHALER: CPT

## 2025-08-10 PROCEDURE — 80048 BASIC METABOLIC PNL TOTAL CA: CPT | Performed by: PHYSICIAN ASSISTANT

## 2025-08-10 PROCEDURE — 27000221 HC OXYGEN, UP TO 24 HOURS

## 2025-08-10 PROCEDURE — 63600175 PHARM REV CODE 636 W HCPCS: Performed by: STUDENT IN AN ORGANIZED HEALTH CARE EDUCATION/TRAINING PROGRAM

## 2025-08-10 PROCEDURE — 11000001 HC ACUTE MED/SURG PRIVATE ROOM

## 2025-08-10 PROCEDURE — 36415 COLL VENOUS BLD VENIPUNCTURE: CPT | Performed by: PHYSICIAN ASSISTANT

## 2025-08-10 RX ORDER — INSULIN GLARGINE 100 [IU]/ML
10 INJECTION, SOLUTION SUBCUTANEOUS 2 TIMES DAILY
Status: DISCONTINUED | OUTPATIENT
Start: 2025-08-10 | End: 2025-08-11 | Stop reason: HOSPADM

## 2025-08-10 RX ORDER — INSULIN GLARGINE 100 [IU]/ML
10 INJECTION, SOLUTION SUBCUTANEOUS DAILY
Status: DISCONTINUED | OUTPATIENT
Start: 2025-08-10 | End: 2025-08-10

## 2025-08-10 RX ORDER — NAPROXEN SODIUM 220 MG/1
81 TABLET, FILM COATED ORAL DAILY
Status: DISCONTINUED | OUTPATIENT
Start: 2025-08-10 | End: 2025-08-11 | Stop reason: HOSPADM

## 2025-08-10 RX ORDER — ATORVASTATIN CALCIUM 10 MG/1
20 TABLET, FILM COATED ORAL DAILY
Status: DISCONTINUED | OUTPATIENT
Start: 2025-08-11 | End: 2025-08-11 | Stop reason: HOSPADM

## 2025-08-10 RX ADMIN — DEXTROSE MONOHYDRATE 25 G: 25 INJECTION, SOLUTION INTRAVENOUS at 05:08

## 2025-08-10 RX ADMIN — INSULIN GLARGINE 10 UNITS: 100 INJECTION, SOLUTION SUBCUTANEOUS at 09:08

## 2025-08-10 RX ADMIN — SODIUM CHLORIDE: 9 INJECTION, SOLUTION INTRAVENOUS at 08:08

## 2025-08-10 RX ADMIN — AMPICILLIN SODIUM AND SULBACTAM SODIUM 3 G: 2; 1 INJECTION, POWDER, FOR SOLUTION INTRAMUSCULAR; INTRAVENOUS at 01:08

## 2025-08-10 RX ADMIN — Medication 10 ML: at 02:08

## 2025-08-10 RX ADMIN — THERA TABS 1 TABLET: TAB at 09:08

## 2025-08-10 RX ADMIN — POTASSIUM BICARBONATE 50 MEQ: 977.5 TABLET, EFFERVESCENT ORAL at 09:08

## 2025-08-10 RX ADMIN — AMPICILLIN SODIUM AND SULBACTAM SODIUM 3 G: 2; 1 INJECTION, POWDER, FOR SOLUTION INTRAMUSCULAR; INTRAVENOUS at 02:08

## 2025-08-10 RX ADMIN — FOLIC ACID 1 MG: 1 TABLET ORAL at 09:08

## 2025-08-10 RX ADMIN — AMPICILLIN SODIUM AND SULBACTAM SODIUM 3 G: 2; 1 INJECTION, POWDER, FOR SOLUTION INTRAMUSCULAR; INTRAVENOUS at 09:08

## 2025-08-10 RX ADMIN — THIAMINE HCL TAB 100 MG 100 MG: 100 TAB at 09:08

## 2025-08-10 RX ADMIN — ASPIRIN 81 MG CHEWABLE TABLET 81 MG: 81 TABLET CHEWABLE at 12:08

## 2025-08-10 RX ADMIN — MUPIROCIN: 20 OINTMENT TOPICAL at 08:08

## 2025-08-10 RX ADMIN — INSULIN GLARGINE 10 UNITS: 100 INJECTION, SOLUTION SUBCUTANEOUS at 08:08

## 2025-08-10 RX ADMIN — AMPICILLIN SODIUM AND SULBACTAM SODIUM 3 G: 2; 1 INJECTION, POWDER, FOR SOLUTION INTRAMUSCULAR; INTRAVENOUS at 08:08

## 2025-08-11 VITALS
OXYGEN SATURATION: 97 % | SYSTOLIC BLOOD PRESSURE: 165 MMHG | TEMPERATURE: 98 F | WEIGHT: 123.69 LBS | HEART RATE: 87 BPM | BODY MASS INDEX: 17.31 KG/M2 | DIASTOLIC BLOOD PRESSURE: 94 MMHG | HEIGHT: 71 IN | RESPIRATION RATE: 18 BRPM

## 2025-08-11 PROBLEM — L30.8 DERMATITIS ASSOCIATED WITH MOISTURE: Status: ACTIVE | Noted: 2025-08-11

## 2025-08-11 PROBLEM — E11.9 TYPE 2 DIABETES MELLITUS: Status: RESOLVED | Noted: 2019-05-24 | Resolved: 2025-08-11

## 2025-08-11 LAB
ABSOLUTE EOSINOPHIL (OHS): 0.56 K/UL
ABSOLUTE MONOCYTE (OHS): 0.86 K/UL (ref 0.3–1)
ABSOLUTE NEUTROPHIL COUNT (OHS): 5.01 K/UL (ref 1.8–7.7)
ANION GAP (OHS): 10 MMOL/L (ref 8–16)
BASOPHILS # BLD AUTO: 0.02 K/UL
BASOPHILS NFR BLD AUTO: 0.3 %
BUN SERPL-MCNC: 14 MG/DL (ref 6–20)
CALCIUM SERPL-MCNC: 8.6 MG/DL (ref 8.7–10.5)
CHLORIDE SERPL-SCNC: 102 MMOL/L (ref 95–110)
CO2 SERPL-SCNC: 24 MMOL/L (ref 23–29)
CREAT SERPL-MCNC: 0.6 MG/DL (ref 0.5–1.4)
ERYTHROCYTE [DISTWIDTH] IN BLOOD BY AUTOMATED COUNT: 16.3 % (ref 11.5–14.5)
GFR SERPLBLD CREATININE-BSD FMLA CKD-EPI: >60 ML/MIN/1.73/M2
GLUCOSE SERPL-MCNC: 148 MG/DL (ref 70–110)
HCT VFR BLD AUTO: 34 % (ref 40–54)
HGB BLD-MCNC: 9.9 GM/DL (ref 14–18)
IMM GRANULOCYTES # BLD AUTO: 0.03 K/UL (ref 0–0.04)
IMM GRANULOCYTES NFR BLD AUTO: 0.4 % (ref 0–0.5)
LYMPHOCYTES # BLD AUTO: 1.34 K/UL (ref 1–4.8)
MAGNESIUM SERPL-MCNC: 1.9 MG/DL (ref 1.6–2.6)
MCH RBC QN AUTO: 21 PG (ref 27–31)
MCHC RBC AUTO-ENTMCNC: 29.1 G/DL (ref 32–36)
MCV RBC AUTO: 72 FL (ref 82–98)
NUCLEATED RBC (/100WBC) (OHS): 0 /100 WBC
PLATELET # BLD AUTO: 422 K/UL (ref 150–450)
PMV BLD AUTO: 9.7 FL (ref 9.2–12.9)
POCT GLUCOSE: 112 MG/DL (ref 70–110)
POCT GLUCOSE: 119 MG/DL (ref 70–110)
POCT GLUCOSE: 135 MG/DL (ref 70–110)
POCT GLUCOSE: 145 MG/DL (ref 70–110)
POCT GLUCOSE: 218 MG/DL (ref 70–110)
POTASSIUM SERPL-SCNC: 3.6 MMOL/L (ref 3.5–5.1)
RBC # BLD AUTO: 4.71 M/UL (ref 4.6–6.2)
RELATIVE EOSINOPHIL (OHS): 7.2 %
RELATIVE LYMPHOCYTE (OHS): 17.1 % (ref 18–48)
RELATIVE MONOCYTE (OHS): 11 % (ref 4–15)
RELATIVE NEUTROPHIL (OHS): 64 % (ref 38–73)
SODIUM SERPL-SCNC: 136 MMOL/L (ref 136–145)
WBC # BLD AUTO: 7.82 K/UL (ref 3.9–12.7)

## 2025-08-11 PROCEDURE — A9698 NON-RAD CONTRAST MATERIALNOC: HCPCS | Performed by: STUDENT IN AN ORGANIZED HEALTH CARE EDUCATION/TRAINING PROGRAM

## 2025-08-11 PROCEDURE — 92611 MOTION FLUOROSCOPY/SWALLOW: CPT

## 2025-08-11 PROCEDURE — 83735 ASSAY OF MAGNESIUM: CPT | Performed by: PHYSICIAN ASSISTANT

## 2025-08-11 PROCEDURE — 92526 ORAL FUNCTION THERAPY: CPT

## 2025-08-11 PROCEDURE — 94761 N-INVAS EAR/PLS OXIMETRY MLT: CPT

## 2025-08-11 PROCEDURE — 25000003 PHARM REV CODE 250: Performed by: PHYSICIAN ASSISTANT

## 2025-08-11 PROCEDURE — 99900035 HC TECH TIME PER 15 MIN (STAT)

## 2025-08-11 PROCEDURE — 25000003 PHARM REV CODE 250: Performed by: STUDENT IN AN ORGANIZED HEALTH CARE EDUCATION/TRAINING PROGRAM

## 2025-08-11 PROCEDURE — 94664 DEMO&/EVAL PT USE INHALER: CPT

## 2025-08-11 PROCEDURE — 80048 BASIC METABOLIC PNL TOTAL CA: CPT | Performed by: PHYSICIAN ASSISTANT

## 2025-08-11 PROCEDURE — 99223 1ST HOSP IP/OBS HIGH 75: CPT | Mod: ,,,

## 2025-08-11 PROCEDURE — 85025 COMPLETE CBC W/AUTO DIFF WBC: CPT | Performed by: PHYSICIAN ASSISTANT

## 2025-08-11 PROCEDURE — A4216 STERILE WATER/SALINE, 10 ML: HCPCS | Performed by: PHYSICIAN ASSISTANT

## 2025-08-11 PROCEDURE — 63600175 PHARM REV CODE 636 W HCPCS: Performed by: STUDENT IN AN ORGANIZED HEALTH CARE EDUCATION/TRAINING PROGRAM

## 2025-08-11 PROCEDURE — 36415 COLL VENOUS BLD VENIPUNCTURE: CPT | Performed by: PHYSICIAN ASSISTANT

## 2025-08-11 PROCEDURE — 25500020 PHARM REV CODE 255: Performed by: STUDENT IN AN ORGANIZED HEALTH CARE EDUCATION/TRAINING PROGRAM

## 2025-08-11 RX ORDER — AMLODIPINE BESYLATE 5 MG/1
5 TABLET ORAL DAILY
Qty: 30 TABLET | Refills: 0 | Status: SHIPPED | OUTPATIENT
Start: 2025-08-11 | End: 2025-09-10

## 2025-08-11 RX ORDER — AMLODIPINE BESYLATE 5 MG/1
5 TABLET ORAL DAILY
Status: DISCONTINUED | OUTPATIENT
Start: 2025-08-11 | End: 2025-08-11 | Stop reason: HOSPADM

## 2025-08-11 RX ORDER — AMOXICILLIN AND CLAVULANATE POTASSIUM 875; 125 MG/1; MG/1
1 TABLET, FILM COATED ORAL EVERY 12 HOURS
Qty: 14 TABLET | Refills: 0 | Status: SHIPPED | OUTPATIENT
Start: 2025-08-11 | End: 2025-08-18

## 2025-08-11 RX ADMIN — AMPICILLIN SODIUM AND SULBACTAM SODIUM 3 G: 2; 1 INJECTION, POWDER, FOR SOLUTION INTRAMUSCULAR; INTRAVENOUS at 10:08

## 2025-08-11 RX ADMIN — AMPICILLIN SODIUM AND SULBACTAM SODIUM 3 G: 2; 1 INJECTION, POWDER, FOR SOLUTION INTRAMUSCULAR; INTRAVENOUS at 02:08

## 2025-08-11 RX ADMIN — MUPIROCIN: 20 OINTMENT TOPICAL at 10:08

## 2025-08-11 RX ADMIN — INSULIN GLARGINE 10 UNITS: 100 INJECTION, SOLUTION SUBCUTANEOUS at 10:08

## 2025-08-11 RX ADMIN — THERA TABS 1 TABLET: TAB at 10:08

## 2025-08-11 RX ADMIN — AMLODIPINE BESYLATE 5 MG: 5 TABLET ORAL at 10:08

## 2025-08-11 RX ADMIN — BARIUM SULFATE 30 ML: 400 SUSPENSION ORAL at 10:08

## 2025-08-11 RX ADMIN — FOLIC ACID 1 MG: 1 TABLET ORAL at 10:08

## 2025-08-11 RX ADMIN — ATORVASTATIN CALCIUM 20 MG: 10 TABLET, FILM COATED ORAL at 10:08

## 2025-08-11 RX ADMIN — SODIUM CHLORIDE: 9 INJECTION, SOLUTION INTRAVENOUS at 06:08

## 2025-08-11 RX ADMIN — ASPIRIN 81 MG CHEWABLE TABLET 81 MG: 81 TABLET CHEWABLE at 10:08

## 2025-08-11 RX ADMIN — THIAMINE HCL TAB 100 MG 100 MG: 100 TAB at 10:08

## 2025-08-11 RX ADMIN — Medication 10 ML: at 02:08

## 2025-08-13 LAB
BACTERIA BLD CULT: NORMAL
BACTERIA BLD CULT: NORMAL

## 2025-08-16 LAB
POCT GLUCOSE: 100 MG/DL (ref 70–110)
POCT GLUCOSE: 230 MG/DL (ref 70–110)

## 2025-08-18 ENCOUNTER — TELEPHONE (OUTPATIENT)
Dept: FAMILY MEDICINE | Facility: CLINIC | Age: 60
End: 2025-08-18
Payer: MEDICARE

## (undated) DEVICE — HEMOSTAT VASC BAND REG 24CM

## (undated) DEVICE — KIT HAND CONTROL HIGH PRESSUR

## (undated) DEVICE — INFLATOR ADVANTAGE ENCORE 26

## (undated) DEVICE — WIRE GUIDE SAFE-T-J .035 260CM

## (undated) DEVICE — KIT MANIFOLD LOW PRESS TUBING

## (undated) DEVICE — VALVE CONTROL COPILOT

## (undated) DEVICE — CATH EMERGE MR 20 X 3.00

## (undated) DEVICE — CATH NC QUANTUM APEX MR 3.5X20

## (undated) DEVICE — PAD DEFIB CADENCE ADULT R2

## (undated) DEVICE — GUIDEWIRE PROWATER .014X180CM

## (undated) DEVICE — PACK CATH LAB

## (undated) DEVICE — KIT SYR REUSABLE

## (undated) DEVICE — OMNIPAQUE 350MG 150ML VIAL

## (undated) DEVICE — GUIDE LAUNCHER 6FR JR 4.0 SH

## (undated) DEVICE — KIT PROBE COVER WITH GEL

## (undated) DEVICE — CATH OPTITORQUE TIGER 5F 100CM

## (undated) DEVICE — KIT GLIDESHEATH SLEND 6FR 10CM